# Patient Record
Sex: FEMALE | Race: WHITE | NOT HISPANIC OR LATINO | Employment: OTHER | URBAN - METROPOLITAN AREA
[De-identification: names, ages, dates, MRNs, and addresses within clinical notes are randomized per-mention and may not be internally consistent; named-entity substitution may affect disease eponyms.]

---

## 2017-01-03 ENCOUNTER — HOSPITAL ENCOUNTER (OUTPATIENT)
Dept: RADIOLOGY | Facility: CLINIC | Age: 52
Discharge: HOME | End: 2017-01-03
Attending: FAMILY MEDICINE
Payer: MEDICARE

## 2017-01-03 ENCOUNTER — OFFICE VISIT (OUTPATIENT)
Dept: FAMILY MEDICINE | Facility: CLINIC | Age: 52
End: 2017-01-03
Payer: MEDICARE

## 2017-01-03 VITALS
WEIGHT: 207.25 LBS | SYSTOLIC BLOOD PRESSURE: 154 MMHG | DIASTOLIC BLOOD PRESSURE: 78 MMHG | HEIGHT: 69 IN | RESPIRATION RATE: 18 BRPM | HEART RATE: 77 BPM | TEMPERATURE: 98 F | BODY MASS INDEX: 30.7 KG/M2

## 2017-01-03 DIAGNOSIS — I50.9 CONGESTIVE HEART FAILURE, UNSPECIFIED CONGESTIVE HEART FAILURE CHRONICITY, UNSPECIFIED CONGESTIVE HEART FAILURE TYPE: ICD-10-CM

## 2017-01-03 DIAGNOSIS — E66.9 OBESITY (BMI 30.0-34.9): ICD-10-CM

## 2017-01-03 DIAGNOSIS — E78.5 HYPERLIPIDEMIA ASSOCIATED WITH TYPE 2 DIABETES MELLITUS: ICD-10-CM

## 2017-01-03 DIAGNOSIS — Z91.89 CARDIOVASCULAR EVENT RISK: ICD-10-CM

## 2017-01-03 DIAGNOSIS — Z86.79 HISTORY OF ATRIAL FIBRILLATION: ICD-10-CM

## 2017-01-03 DIAGNOSIS — I11.9 HYPERTENSIVE LEFT VENTRICULAR HYPERTROPHY, WITHOUT HEART FAILURE: Primary | ICD-10-CM

## 2017-01-03 DIAGNOSIS — E11.69 HYPERLIPIDEMIA ASSOCIATED WITH TYPE 2 DIABETES MELLITUS: ICD-10-CM

## 2017-01-03 PROCEDURE — 71020 XR CHEST PA AND LATERAL: CPT | Mod: 26,,, | Performed by: RADIOLOGY

## 2017-01-03 PROCEDURE — 3078F DIAST BP <80 MM HG: CPT | Mod: S$GLB,,, | Performed by: FAMILY MEDICINE

## 2017-01-03 PROCEDURE — 1159F MED LIST DOCD IN RCRD: CPT | Mod: S$GLB,,, | Performed by: FAMILY MEDICINE

## 2017-01-03 PROCEDURE — 99214 OFFICE O/P EST MOD 30 MIN: CPT | Mod: S$GLB,,, | Performed by: FAMILY MEDICINE

## 2017-01-03 PROCEDURE — 2022F DILAT RTA XM EVC RTNOPTHY: CPT | Mod: S$GLB,,, | Performed by: FAMILY MEDICINE

## 2017-01-03 PROCEDURE — 99999 PR PBB SHADOW E&M-EST. PATIENT-LVL IV: CPT | Mod: PBBFAC,,, | Performed by: FAMILY MEDICINE

## 2017-01-03 PROCEDURE — 3045F PR MOST RECENT HEMOGLOBIN A1C LEVEL 7.0-9.0%: CPT | Mod: S$GLB,,, | Performed by: FAMILY MEDICINE

## 2017-01-03 PROCEDURE — 71020 XR CHEST PA AND LATERAL: CPT | Mod: TC,PO

## 2017-01-03 PROCEDURE — 3077F SYST BP >= 140 MM HG: CPT | Mod: S$GLB,,, | Performed by: FAMILY MEDICINE

## 2017-01-03 PROCEDURE — 3066F NEPHROPATHY DOC TX: CPT | Mod: S$GLB,,, | Performed by: FAMILY MEDICINE

## 2017-01-03 RX ORDER — ASPIRIN 81 MG/1
81 TABLET ORAL DAILY
COMMUNITY
End: 2017-10-26 | Stop reason: SDUPTHER

## 2017-01-03 RX ORDER — FUROSEMIDE 40 MG/1
20 TABLET ORAL 2 TIMES DAILY
Qty: 30 TABLET | Refills: 3 | Status: SHIPPED | OUTPATIENT
Start: 2017-01-03 | End: 2017-01-04

## 2017-01-03 NOTE — MR AVS SNAPSHOT
[facility]  [address]  [place] [address]  Phone: [phone]  Fax: [phone]                  Leanna García   1/3/2017 10:00 AM   Office Visit    Description:  Female : 1965   Provider:  Nolberto Lomax MD   Department:  [place] [facility]           Reason for Visit     Follow-up           Diagnoses this Visit        Comments    Hypertensive left ventricular hypertrophy, without heart failure    -  Primary     History of atrial fibrillation         Cardiovascular event risk         Congestive heart failure, unspecified congestive heart failure chronicity, unspecified congestive heart failure type         Uncontrolled type 2 diabetes mellitus with stage 3 chronic kidney disease, without long-term current use of insulin                To Do List           Future Appointments        Provider Department Dept Phone    1/3/2017 10:45 AM [facility] XR1 [facility]- X-Ray [phone]    1/3/2017 3:00 PM Oscar Charles MD [facility] Nephrology [phone]    2017 1:40 PM Nolberto Lomax MD [facility] [phone]    2017 9:00 AM SPECIMEN, [facility] - Lab [phone]    2017 11:20 AM Nolberto Lomax MD [facility] [phone]      Goals (5 Years of Data)     None      [facility]     [facility] On Call Nurse Care Line -  Assistance  Registered nurses in the [facility] On Call Center provide clinical advisement, health education, appointment booking, and other advisory services.  Call for this free service at [phone].             Medications           Message regarding Medications     Verify the changes and/or additions to your medication regime listed below are the same as discussed with your clinician today.  If any of these changes or additions are incorrect, please notify your healthcare provider.        STOP taking these medications     aspirin 325 MG tablet Take 1 tablet (325 mg total) by mouth once daily.     cloNIDine tablet 0.1 mg            Verify that the below list of medications is an accurate representation of the medications you are currently taking.  If none reported, the list may be blank. If incorrect, please contact your healthcare provider. Carry this list with you in case of emergency.           Current Medications     albuterol (PROVENTIL) 2.5 mg /3 mL (0.083 %) nebulizer solution Take 2.5 mg by nebulization 2 (two) times daily as needed for Wheezing.    albuterol 90 mcg/actuation inhaler Inhale 2 puffs into the lungs 4 (four) times daily.    aspirin (ECOTRIN) 81 MG EC tablet Take 81 mg by mouth once daily.    atorvastatin (LIPITOR) 80 MG tablet Take 1 tablet (80 mg total) by mouth once daily.    blood sugar diagnostic Strp TrueResult Glucometer Strips.  Testing Daily.    escitalopram oxalate (LEXAPRO) 10 MG tablet Take 1 tablet (10 mg total) by mouth every evening.    fenofibrate 160 MG Tab Take 1 tablet (160 mg total) by mouth once daily.    fish oil-omega-3 fatty acids 300-1,000 mg capsule Take 2 g by mouth once daily. Stop taking until after surgery.    furosemide (LASIX) 40 MG tablet Take 1 tablet (40 mg total) by mouth once daily.    glipiZIDE (GLUCOTROL) 10 MG tablet Take 1 tablet (10 mg total) by mouth 2 (two) times daily before meals.    guaifenesin-codeine 100-10 mg/5 ml (TUSSI-ORGANIDIN NR)  mg/5 mL syrup Take 5 mLs by mouth 3 (three) times daily as needed for Cough.    hydrochlorothiazide (HYDRODIURIL) 12.5 MG Tab Take 12.5 mg by mouth once daily.    lancets Misc Use to test blood sugar daily    True Results  DX:E11.9    levoFLOXacin (LEVAQUIN) 250 MG tablet Take 3 tablets (750 mg total) by mouth once daily.    losartan (COZAAR) 100 MG tablet Take 1 tablet (100 mg total) by mouth once daily.    metformin (GLUCOPHAGE-XR) 750 MG 24 hr tablet Take 1 tablet (750 mg total) by mouth 2 (two) times daily with meals.    metoprolol tartrate (LOPRESSOR) 25 MG tablet Take 1 tablet (25 mg total) by  "mouth 2 (two) times daily.    nifedipine (PROCARDIA-XL) 60 MG (OSM) 24 hr tablet Take 1 tablet (60 mg total) by mouth once daily.    omeprazole (PRILOSEC) 40 MG capsule Take 1 capsule (40 mg total) by mouth once daily.    polyethylene glycol (GLYCOLAX) 17 gram/dose powder Take 17 g by mouth once daily. 2 x daily for 2 weeks then once daily for 4 weeks, hold if loose bm and decrease to every other day    TRUEPLUS LANCETS 33 gauge [facility]            Clinical Reference Information           Vital Signs - Last Recorded  Most recent update: 1/3/2017  9:38 AM by Melyssa Boyce MA    BP Pulse Temp Resp Ht Wt    (!) 154/78 (BP Location: Right arm, Patient Position: Sitting, BP Method: Manual) 77 97.8 °F (36.6 °C) (Oral) 18 5' 9" (1.753 m) 94 kg (207 lb 3.7 oz)    BMI                30.6 kg/m2          Blood Pressure          Most Recent Value    BP  (!)  154/78      Allergies as of 1/3/2017     Dilaudid [Hydromorphone (Bulk)]    Lortab [Hydrocodone-acetaminophen]      Immunizations Administered on Date of Encounter - 1/3/2017     None      Orders Placed During Today's Visit      Normal Orders This Visit    Ambulatory referral to Cardiology     Ambulatory referral to Ophthalmology     Future Labs/Procedures Expected by Expires    X-Ray Chest PA And Lateral  1/3/2017 1/3/2018      "

## 2017-01-03 NOTE — PROGRESS NOTES
"[facility]  Progress Note    Subjective:       Patient ID: Leanna García is a 51 y.o. female.    Chief Complaint: shortness of breath follow up    HPI51 y.o.female is here today for follow-up visit.  Patient was evaluated at last visit for continued cough.  Chest x-ray showed bilateral pleural effusion at that time.  Patient dose not have a history of heart failure.  Patient does have a past history of atrial fibrillation and left ventricular hypertrophy.  Patient continues to have uncontrolled blood pressure which may be may be contributing to her symptoms.  Since patient began Lasix symptoms have resolved. Patient is doing much better today. BP has been in better control. Still not at goal. No further complaints at the current time.    Review of Systems   Constitutional: Negative for chills, fatigue and fever.   HENT: Negative for congestion, ear pain, postnasal drip, sinus pressure, sneezing and sore throat.    Eyes: Negative for pain.   Respiratory: Positive for cough. Negative for shortness of breath and wheezing.    Cardiovascular: Positive for leg swelling. Negative for chest pain and palpitations.   Gastrointestinal: Negative for abdominal distention, abdominal pain, constipation, diarrhea, nausea and vomiting.   Genitourinary: Negative for difficulty urinating.   Musculoskeletal: Negative for back pain and myalgias.   Skin: Negative for rash.   Neurological: Negative for dizziness, seizures, syncope, weakness and numbness.   Psychiatric/Behavioral: Negative for sleep disturbance. The patient is not nervous/anxious.        Objective:      Vitals:    01/03/17 0930 01/03/17 0935   BP: (!) 168/75 (!) 154/78   BP Location: Right arm Right arm   Patient Position: Sitting Sitting   BP Method: Automatic Manual   Pulse: 77    Resp: 18    Temp: 97.8 °F (36.6 °C)    TempSrc: Oral    Weight: 94 kg (207 lb 3.7 oz)    Height: 5' 9" (1.753 m)      Body mass index is 30.6 kg/(m^2).  Physical Exam "   Constitutional: She is oriented to person, place, and time. She appears well-developed and well-nourished.   HENT:   Head: Normocephalic and atraumatic.   Eyes: Conjunctivae and EOM are normal. Pupils are equal, round, and reactive to light.   Neck: Normal range of motion. Neck supple. No JVD present.   Cardiovascular: Normal rate, regular rhythm, normal heart sounds and intact distal pulses.  Exam reveals no gallop and no friction rub.    No murmur heard.  Pulses:       Dorsalis pedis pulses are 2+ on the right side, and 2+ on the left side.        Posterior tibial pulses are 2+ on the right side, and 2+ on the left side.   +2 pitting edema bilaterally lower extremity    Pulmonary/Chest: Effort normal. No respiratory distress. She has no wheezes.   Abdominal: Soft. Bowel sounds are normal. There is no tenderness. There is no rebound and no guarding.   Musculoskeletal: Normal range of motion.        Right foot: There is normal range of motion and no deformity.        Left foot: There is normal range of motion and no deformity.   Feet:   Right Foot:   Protective Sensation: 10 sites tested. 10 sites sensed.   Skin Integrity: Negative for ulcer, blister, skin breakdown, erythema, warmth, callus or dry skin.   Left Foot:   Protective Sensation: 10 sites tested. 10 sites sensed.   Skin Integrity: Negative for ulcer, blister, skin breakdown, erythema, warmth, callus or dry skin.   Neurological: She is alert and oriented to person, place, and time. No cranial nerve deficit.   Skin: Skin is warm and dry.   Psychiatric: She has a normal mood and affect. Her behavior is normal. Judgment and thought content normal.   Nursing note and vitals reviewed.      Assessment:       1. Hypertensive left ventricular hypertrophy, without heart failure    2. History of atrial fibrillation    3. Cardiovascular event risk, ASCVD 10-year risk 6.7%    4. Congestive heart failure, unspecified congestive heart failure chronicity, unspecified  congestive heart failure type    5. Uncontrolled type 2 diabetes mellitus with stage 3 chronic kidney disease, without long-term current use of insulin    6. Hyperlipidemia associated with type 2 diabetes mellitus    7. Obesity (BMI 30.0-34.9)        Plan:       Hypertensive left ventricular hypertrophy, without heart failure        - Patient is beginning to have fluid backup secondary to uncontrolled HTN         - Will follow up with cardiology for further recommendations     History of atrial fibrillation        - Follow up with cardiology     Cardiovascular event risk, ASCVD 10-year risk 6.7%  -     Ambulatory referral to Cardiology    Congestive heart failure, unspecified congestive heart failure chronicity, unspecified congestive heart failure type  -     X-Ray Chest PA And Lateral; Future; Expected date: 1/3/17  - Patient was found to have bilateral pleural effusions at last vist  - Since lasix symptoms have resolved     Uncontrolled type 2 diabetes mellitus with stage 3 chronic kidney disease, without long-term current use of insulin  -     Ambulatory referral to Ophthalmology    Patient readiness: acceptance and barriers:none    During the course of the visit the patient was educated and counseled about the following:     Diabetes:  Discussed general issues about diabetes pathophysiology and management.  Educational material distributed.  Addressed ADA diet.  Suggested low cholesterol diet.  Hypertension:   Dietary sodium restriction.  Regular aerobic exercise.  Check blood pressures daily and record.  Obesity:   General weight loss/lifestyle modification strategies discussed (elicit support from others; identify saboteurs; non-food rewards, etc).  Informal exercise measures discussed, e.g. taking stairs instead of elevator.  Regular aerobic exercise program discussed.    Goals: Diabetes: Maintain Hemoglobin A1C below 7, Hypertension: Reduce Blood Pressure and Obesity: Reduce calorie intake and BMI    Did  patient meet goals/outcomes: Yes    The following self management tools provided: blood pressure log  blood glucose log  excercise log    Patient Instructions (the written plan) was given to the patient/family.     Time spent with patient: 45 minutes      Return in about 4 weeks (around 1/31/2017).  Nolberto Lomax MD  [facility]  1/3/2017 10:03 AM

## 2017-01-04 ENCOUNTER — TELEPHONE (OUTPATIENT)
Dept: FAMILY MEDICINE | Facility: CLINIC | Age: 52
End: 2017-01-04

## 2017-01-04 RX ORDER — FUROSEMIDE 40 MG/1
TABLET ORAL
COMMUNITY
End: 2017-01-04 | Stop reason: SDUPTHER

## 2017-01-04 RX ORDER — FUROSEMIDE 40 MG/1
40 TABLET ORAL DAILY
Qty: 45 TABLET | Refills: 0 | Status: SHIPPED | OUTPATIENT
Start: 2017-01-04 | End: 2017-03-02 | Stop reason: SDUPTHER

## 2017-01-04 NOTE — TELEPHONE ENCOUNTER
----- Message from Sofie Taylor sent at 1/4/2017  1:40 PM CST -----  Contact:  call //[phone]    Calling to    Get  Test  Results // please call

## 2017-01-04 NOTE — TELEPHONE ENCOUNTER
----- Message from Orly More sent at 1/4/2017 10:01 AM CST -----  Contact: pt  As requested message for [place], please call [facility] at # [phone] for referral  Call back on   thanks

## 2017-01-04 NOTE — TELEPHONE ENCOUNTER
----- Message from Nolberto Lomax MD sent at 1/3/2017 12:52 PM CST -----  Please call and inform patient that pneumonia and fluid in lungs have resolved.

## 2017-01-04 NOTE — TELEPHONE ENCOUNTER
----- Message from So Mansfield sent at 1/3/2017  2:45 PM CST -----  Contact: patient  Patient calling in regards to wanting a new referral to see a Nephrology Doctor located in [place], because she couldn't make the appt today in [place]. She needs a Doctor closse to her. She would like to speak with a Nurse. Please advise.  Call back .  Thanks!

## 2017-01-04 NOTE — TELEPHONE ENCOUNTER
Referral: Dr.Daniel Carlson, due to location, Nephrology patient has 3/6/2017 appointment scheduled. She will call insurance to be certain  is approved provider.  Asks about Lasix through mail order; verified with ; Lasix 40mg 1/2 tablet daily #45 no refills is correct dose. Called [employer]; DC'd all Lasix order and gave verbal for correct dose and quantity; e-scribed also. Left message with information for patient

## 2017-01-05 ENCOUNTER — OUTPATIENT CASE MANAGEMENT (OUTPATIENT)
Dept: ADMINISTRATIVE | Facility: OTHER | Age: 52
End: 2017-01-05

## 2017-01-05 NOTE — PROGRESS NOTES
Name Leanna RIZVI 1965 Medical Record  # [id number]   Visit Location __ Home __ Clinic _x_ Telephonic __Other  Encounter Date:  17   Contact Type __ New Case   _x_ Follow Up  __ Monitoring  __ Case Closure Next Follow-up Date Week of   17             Summary:  17- Called and spoke to patient about her blood pressure, new medications, s/p hernia surgery, constipation, diabetes, pneumonia and her upcoming doctor appts and labs. Patient has an appt with Dr. Gautam, nephrologist on 2017. Patient to have A1C drawn on 17. Sending patient [facility]  financial assistance paperwork. Will follow up with patient in three weeks.     16-Called and spoke to patient. Went over follow up appts with patient after hospital discharge. Will follow up with patient in 4 weeks.     16-Called and spoke to patient. Patient had a sigmoidoscopy yesterday and a polyp removed. Patient has an appt with Dr. Serrano on 11/15/16 to schedule surgery for her hernia repair.Encouraged her to schedule her surgery as soon as possible if she is having any pain.  Patient is taking all her medications. Blood pressure is 127/80. Patient's A1C was 8.0 on 10/22/16. Will mail to patient diabetic holiday party recipes    10/27/16-Called and patient is unable to talk at this time. Asked to be called back next week.     10/31/16- Called and spoke to patient. Went over education with her on getting her labs, diabetic and blood pressure education. Will mail to patient diabetic education on holiday party recipes. Will follow up with patient after her appt on 10/25/16.    16-Called and patient has seen several doctors for her abdominal pain and her two inguinal hernias. Patient is having a colonoscopy on 10/05/16. Then patient will talk to surgeon about having surgery if needed. Patient went over her instructions with me for the prep for her colonoscopy, answered all her questions. Patient is taking four blood  pressure medications. Talked to patient about handling stress during this time. Will mail patient diabetic education material and blood pressure education material.     09/09/16-Called and spoke to the patient. Patient has been to urgent care, an appt with Dr. Dial and to the ER. Patient has a cough from bronchitis and her blood pressure was high when she went to the ER. Patient is now on four blood pressure medications. Patient asking if she should be on all these blood pressure medications. Patient states that her labs did not show she had a UTI but she was given an antibiotic. Patient is drinking water and cranberry juice.  Patient is taking all her medications. Patient does have a new blood pressure cuff. Her blood pressure this am was 136/79. Patient is aware if her blood pressure gets too high she could have a stroke. Patient is having some stress in her life. Patient's blood glucose this am was 125. Will cont to follow patient for needs.     08/26/16-Called and spoke to patient about her blood pressure, bronchitis and diabetes. Patient is not having any signs or symptoms of bronchitis. Went over educational material with patient on blood pressure and diabetes. Patient's  has gone back to work after being laid off from another job but they are not having any financial difficulties. Patient blood glucose this am was 123. Patient wants to start exercising again but her back is bothering her. Patient is going to get an eye appt when she gets the money for an appt. Patient is trying to follow a low sodium/diabetic diet. Patient's next A1c is in October and she can not wait to see if her A1C is lower.  Will mail patient information on diabetic and blood pressure education.     08/11/16- Patient received all the diabetic information sent to her. Went over the material and answered questions. Patient had an appt with her PCP on 08/08/16. Patient was given a celestone injection, rocephin injection and a RX  for cough syrup. Patient states that she is much better. Patient's blood glucose did run high after the celestone injection but now is running anywhere from 70 to 120. Patient is taking all of her medication and patient's blood pressure remains high so the doctor changed her medication. Patient is eating healthy. Discussed A1C with patient. Her next lab to check her A1C is in October. Patient wants to lose weight and start exercising again.  Will send patient diabetic education and blood pressure education material to patient.     08/04/16-Called and left message for patient to call back.     07/22/16-Called and spoke to patient. Patient's  is out of work at this time. Went over information mailed to patient about diabetes and high blood pressure. Patient needs a new battery for her blood pressure machine. Patient's blood glucose this am was 113. Patient feels like she does not need a dietician at this time since she is taking glipzide. Patient feels like her diabetes is under control with this new medication. Went over diabetes distress scale questions with patient. Will mail patient diabetic education material and blood pressure education.     07/18/16-Called and patient is in [employer]. Will follow up with  patient another day.     07/05/16-Called and spoke to Leanna García, 50 year old female. Patient is independent with her ADL's and does not have home health. Patient does not drive. Patient checks her blood pressure and blood glucose one time a day and keeps logs. Patient was changed from levemir to glipizide because she could not afford the insulin. Patient has no problems with paying for her medications at this time. Went over signs and symptoms of a stroke with patient. Patient eats three meals a day with snacks. Patient does have back pain at times because of her DDD and scoliosis per patient. Patient and her  went camping this weekend. Will send patient resource material for diabetes,  "hypertension, OPCM brochure and my card. Case Management will cont to follow for needs.       Plan:  Problem 1-Discharge hospital instructions -- MET   Problem 2- Diabetes education  Problem 3- Blood Pressure education       Problem  1    Discharge Instructions - Recent Hospitalization   Short Term Goal Patient/Caregiver will:   Be able to verbalize _amoxicillin and colace/metamucil _______ medication dosage/route frequency within _now_.  (__Partially met _x__Met __Not met ___ Deferred)     Will verbalize _2_  interventions of preventing complications due to disease process in now  (__Partially met _x__Met __Not met ___ Deferred)         Long Term Goals Ensure patients who transition from one site of care to the next have the tools and knowledge to be successful with on-going self management.      Goal Met _x_Yes  __No   Reason:      Interventions Date Addressed    x__Compare pre-hospital medications with medications on hospital discharge list 12/05/16   _x_ Identify medications that were prescribed but not obtained   12/05/16   __ Identify medication discrepancies and develop a plan to resolve discrepancies      _x_ Answer questions about medications   12/05/16   __ Alert patient to potential adverse drug reaction(s)      __ Encourage  use of patient's medication management "system"       __ Identify medications needing refills and/or barriers to refill    _x_ Encourage patient to set up follow-up appointment   12/05/16   _x_ Discuss questions with patient for PCP or specialist visit   12/05/16   __ Clarify whether patient will need to obtain follow up tests and/or results      __ Provide teaching for how to obtain follow-up tests and results      _x_ Review discharge instructions   12/05/16   _x_ Discuss patients conditions and self management of condition(s)    12/05/16   _x_ Discuss target symptoms / side effects to monitor and what to do should they arise   12/05/16   _x_ Discuss when PCP should be called   " 12/05/16   _x_Discuss pain management.    12/05/16   _x_ Discuss constipation    12/05/16   __ Discuss patient's personal goal and possible steps for achieving      __ Ensure Durable Medical Equipment is delivered      __ Ensure [facility] is involved if appropriate      _x_ Determine adequacy of support system and need for ongoing case management   12/05/16   __ Connect patient to necessary community resources      __    __    __        Problem   1 Diabetes   Short Term Goal Patient/Caregiver will:   Check and record blood glucose  _1_  times per day for _2_ weeks  (_x_Partially met _x__Met __Not met ___ Deferred)   Verbalize _2_ signs and symptoms of hypo/hyper glycemia _2 _ weeks  (_x_Partially met _x__Met __Not met ___ Deferred)     Verbalize current A1C and understanding of recommended value in _2_ weeks  (_x_Partially met _x__Met __Not met ___ Deferred)      Diet and Meal Planning  Patient/Caregiver will:   Replace _carbohydrates _____________ with _fruits and vegetables ___________ for _2  meals for _2_  Weeks.   (_x_Partially met _x__Met __Not met ___ Deferred)     Choose ____diabetic ______ grocery list items to prepare diet meals in next _2_ weeks  (_x_Partially met _x__Met __Not met ___ Deferred)      Exercise and Weight  Patient/Caregiver will:   Exercise _walk __ for _10 _ minutes a day _3_ times a week  o Example: Yoga, Walk, Jog, etc.  (_x_Partially met ___Met __Not met ___ Deferred     Weigh Daily/Weekly and record weight for _6_ weeks  (__Partially met ___Met __Not met _x__ Deferred)     Gain/Lose _2_ pounds  by Exercising/Walking in _4_ weeks  (_x_Partially met _x__Met __Not met ___ Deferred)    Medications  Patient/Caregiver will:   Be able to verbalize _glucotrol and glucophage_______ medication dosage/route frequency within _2_ weeks  (_x_Partially met _x__Met __Not met ___ Deferred)      Self-Care  Patient/Caregiver will:   Will follow-up with PCP to coordinate referrals to _LABS  "__________ (Ex: Ophthalmology, Podiatry, Labs, Dietician) within _2 weeks  (_x_Partially met ___Met __Not met ___ Deferred)     Will verbalize _2_  interventions of preventing complications due to disease process in _4_ weeks  (_x_Partially met _x__Met __Not met ___ Deferred)    Complications and Co-Morbidities  Patient/Caregiver will:   Can verbalize _2_ long-term complications of diabetes in _4_ weeks  (_x_Partially met _x__Met __Not met ___ Deferred)   Can verbalize _2_ interventions to keep Feet/Eyes healthy in _4_  weeks  (_xPartially met _x__Met __Not met __ Deferred)     Long Term Goals Patient will get A1C tested every 6 months and is results ? 8 will make appoint with PCP within 2 weeks   Goals Met Partially met  x Not met Deferred    Interventions Date Addressed    __ Review with and provide education materials to pt. Types of Diabetes: "Pre Diabetes" (LENNY)    __ Review with and provide education materials to pt. Types of Diabetes: "What is Type 1 Diabetes?" (LENNY)    _x_ Review with and provide education materials to pt. Types of Diabetes: "What is Type 2 Diabetes?" (LENNY) 08/11/16 09/23/16   _x_ Review with and provide education materials to pt. Resources for People with Diabetes (LENNY) 07/22/16 09/23/16     Glucose Monitoring    _x_ Discuss glucometer monitoring  07/05/16  07/22/16  08/11/16  07/26/16  09/09/16  09/23/16  10/10/16  11/03/16  12/05/16  01/05/17   _x_ Educate regarding physicians recommended BS range 07/05/16  07/22/16  08/11/16  09/09/16  09/23/16  10/10/16  01/05/17   _x_ Review with and provide education materials to pt. Review with and provide education materials to pt. Blood Sugar Management: Hypoglycemia (Low Blood Sugar) (LENNY) 07/05/16 07/22/16 08/11/16 08/26/16 09/23/16 12/05/16 01/05/17   _x_ Review with and provide education materials to pt. Review with and provide education materials to pt. Blood Sugar Management: Hyperglycemia (High Blood Sugar) " "(JOCELYNEMES) 07/05/16 07/22/16 01/05/17   _x_ Review with and provide education materials to pt. Blood Sugar Management: Using a Blood Sugar Log  ([employer]) 07/05/16   _x_  Review with and provide education materials to pt. Blood Sugar Management: Managing Diabetes: The A1C Test ([facility]) 07/05/16 07/22/16 08/11/16 08/26/16 11/03/16 01/05/17   Diet and Meal Planning     _x_ Review with and provide education materials to pt. Diet and Meal Planning - "Healthy Meals for Diabetes" (LENNY) 07/05/16  07/22/16  08/11/16  08/26/16  09/23/16  10/10/16  11/03/16  12/05/16  01/05/17   _x Review with and provide education materials to pt. Diet and Meal Planning - "Diabetes: Understanding Carbohydrates"  and Understanding Carbohydrates, Fats and Protein, (LENNY) 07/05/16      _x_ Review with and provide education materials to pt. Diet and Meal Planning - "Eating Out When You Have Diabetes" (LENNY) 08/11/16 08/26/16 01/05/17   _x_ Review with and provide education materials to pt. Diet and Meal Planning - "Learning About Serving and Portion Sizes"  (LENNY) 07/05/16 08/11/16     _x_ Review with and provide education materials to pt. Diet and Meal Planning - "Diabetes: Shopping for and Preparing Meals"  (LENNY) 07/22/16 08/26/16 11/03/16     _x_ Review with and provide education materials to pt. Diet and Meal Planning - "Diabetes: Learning About Serving and Portion Sizes"  (LENNY) 12/05/16 01/05/17   _x_ Review with and provide education materials to pt. Diet and Meal Planning - Grocery List   11/03/16   _x_Review with and provide education materials to pt. Review with and provide education materials to pt. Diet and Meal Planning - "Eating a High Fiber Diet"  (JOCELYNEMES) 08/11/16 12/05/16   _x_ Review with and provide education materials to pt. Diet and Meal Planning - "Diabetes: Meal Planning"  (LENNY) 08/11/16   __ Review with and provide education materials to pt. Complications & Co morbidities - "Diabetes and " "Alcohol Consumption" ([employer] [facility])    Exercise and Weight    _x_ Review with and provide education materials to pt. Exercise: "Exercise to Manage Your Blood Sugar" ([employer] 07/22/16 08/11/16 08/26/16   _x_ Review with and provide education materials to pt. Exercise: "Diabetes: The Benefits of Exercise" ([employer] [facility] [place]) 08/26/16   __ Review with and provide education materials to pt. Exercise: "Before You Start With Diabetes Exercise Plan" ([employer] [facility] [place])    _x_ eview with and provide education materials to pt. Exercise: "Diabetes: Getting Started with Exercise" ([facility] [facility] [place]) 08/11/16   _x_ Review with and provide education materials to pt. Exercise: "Diabetes: Activity Tips" ([facility] [place]) 08/26/16   _x_ Review with and provide education materials to pt. Exercise: "Diabetes: Tracking Your Fitness Progress" ([facility] [facility] [place]) 08/26/16   __ Review with and provide education materials to pt. Exercise: "Type 1 Diabetes: Getting Active"  ([facility] [facility] [place])     _x_ Review with and provide education materials to pt. Weight Management: "Finding Your Ideal Weight" ([facility] [facility] [place]) 08/26/16   Medications    __ Review with and provide education materials to pt. Medications: "Using Injected Insulin" ([employer]    _x_ Review with and provide education materials to pt. Medications: "Oral Therapy for Type 2 Diabetes" ([employer] 07/05/16 08/11/16 08/26/16 09/09/16 11/03/16       __ Review with and provide education materials to pt. Medications: "Types of Insulin" ([employer]    __ Review with and provide education materials to pt. Medications: "Diabetes: Ways to Take Medication"  ([employer]    _x_ Review with and provide education materials to pt. Medications: "Taking Medication for Diabetes"  ([employer] 07/05/16 07/22/16 08/11/16 08/26/16 09/09/16   Complications and Co morbidities    __ Review with and provide education materials to pt. Complications & Co morbidities - "After Leg Amputation: Keeping Your Other Leg Healthy" ([employer]    __ Review with and provide education materials to pt. " "Complications & Co morbidities - "Diabetic Retinopathy: Controlling Your Risk Factors" ([facility]    _x_ Review with and provide education materials to pt. Complications & Co morbidities - "Long-term Complications of Diabetes" (CURRENT) 08/11/16 08/26/16 09/09/16   __ Review with and provide education materials to pt. Complications & Co morbidities - "Diabetic Retinopathy: Evaluating Your Eyes"(CURRENT)    _x_ Review with and provide education materials to pt. Types of Diabetes: "Diabetic Retinopathy: Controlling Your Risk Factors" (LENNY) 08/26/16    Self  Care    _x_  Review with and provide education materials to pt. Self Care - "Diabetes: Keeping Your Feet Healthy" (CURRENT) 07/22/16     _x_ Review with and provide education materials to pt. Self Care - "Your Diabetes Foot Care Program"  (CURRENT) 08/26/16 09/09/16   _x_ Review with and provide education materials to pt. Self Care - "Diabetes: Caring For Your Body" (LENNY) 10/10/16   _x_ Review with and provide education materials to pt. Self Care - "Diabetes: Sick-Day Plan" (CURRENT) 08/11/16 09/09/16 12/05/16 01/05/17   __ Review with and provide education materials to pt. Self Care - "Diabetes: Driving Issues" (CURRENT)    __ Review with and provide education materials to pt. Self Care - "Diabetes: Living Your Life" (CURRENT)    __ Review with and provide education materials to pt. Self Care - "Getting Support When You Have Diabetes" (CURRENT)    _x_ Review with and provide education materials to pt. Self Care - "Managing Stress When You Have Diabetes" (CURRENT) 09/23/16 11/03/16 01/05/17   _x_ Review with and provide education materials to pt. Self Care - "Planning for Travel When You Have Diabetes" (CURRENT) 08/11/16   __ Review with and provide education materials to pt. Types of Diabetes: "Your Diabetes Toolkit" ([facility]          Referrals/Management    __ Facilitate referral to diabetic class    __ Facilitate obtaining DM supplies    __ Facilitate referral " "to podiatrist    __ Monitor DM management (HgA1c, dental exam 2xs/yr, annual flu shot, annual eye exam, annual comprehensive foot exam)    _x_ Encourage regular medical appointments 07/05/16  08/11/16  09/09/16  09/23/16  10/10/16  12/05/16  01/05/17                             Problem   2  Blood Pressure Control   Short Term Goal   Patient/Caregiver will:   Will measure and record blood pressure daily for 2 weeks..  (_x_Partially met _x__Met __Not met ___ Deferred)   Will verbalize_2_   ways to reduce stress in _12_ weeks.  (_x_Partially met ___Met __Not met ___ Deferred)   Will verbalize _2_  risk factors within _6_ weeks.  (_x_Partially met _x__Met __Not met ___ Deferred)  Patient/Caregiver will:   Be able to verbalize norvasc________ medication dosage/route frequency within _2_ weeks  (__Partially met _x__Met __Not met ___ Deferred)      Complications and Co-Morbidities  Patient/Caregiver will:    verbalize _2_ long-term complications of hypertension in _4_ weeks  (_x_Partially met _x__Met __Not met ___ Deferred)   Can verbalize _2_ interventions to keep blood pressure under control  in _8  weeks  (_x_Partially met _x__Met __Not met ___ Deferred)     Long Term Goals Patient/Caregiver will check and record blood pressure daily. If > 140/90 for 3 days, will notify Physician.   Goal Met Met  x Partially met   Not Met Deferred    Interventions Date Addressed    _x_ Review and provide education materials with pt. "Controlling High Blood Pressure" (LENNY)   07/05/16  08/11/16  08/26/16  09/23/16  10/10/16  12/05/16  01/05/17     _x_ Review and provide education materials with pt. "Your High Blood Pressure Risk Factors" (LENNY)   08/11/16  10/10/16  12/05/16  01/05/17   _x_ Review and provide education materials with pt. "Taking Your Blood Pressure" (LENNY)   07/05/16 07/22/16 09/09/16 12/05/16  01/05/17     _x_ Review and provide education materials with pt. "What is High Blood Pressure" (LENNY)   " "07/05/16  09/09/16  10/10/16  12/05/16  01/05/17   __ Review and provide education materials with pt. "High Blood Pressure and Peripheral Arterial Disease" (LENNY)      _x_ Encourage medication compliance   07/05/16  09/09/16  08/11/16  08/26/16  09/23/16  10/10/16  12/05/16  01/05/17   _x_ Discuss s/s of hypotension (weakness, dizziness, mental changes, bradycardia, nausea, vomiting)   07/22/16      _x_ Discuss s/s of hypertension and to call MD immediately if /110 or high BP causing headaches or other symptoms.   07/05/16  07/22/16  10/10/16  12/05/16  01/05/17     _x_ Educate Pt/family to predisposing controllable factors   07/05/16  08/11/16  10/10/16  01/05/17     __    __    ___          "

## 2017-01-06 ENCOUNTER — TELEPHONE (OUTPATIENT)
Dept: FAMILY MEDICINE | Facility: CLINIC | Age: 52
End: 2017-01-06

## 2017-01-06 NOTE — TELEPHONE ENCOUNTER
She's checking on external referral , Nephrologist. Her appointment is 3/6/17  Scheduled 4/2017  appointment with patient; written confirmation mailed;   Also informs BP today 133/80 BS 78 FYI for

## 2017-01-06 NOTE — TELEPHONE ENCOUNTER
----- Message from Daniella Churchill sent at 1/5/2017  3:08 PM CST -----  Contact: Daniella Churchill RN OPCM EXT. [id number]  Patient is asking for a referral to Dr. Parry, cardiologist. Please call patient and advise.     Thank you for your assistance,   Daniella Churchill RN OPCM

## 2017-01-13 DIAGNOSIS — N18.9 CKD (CHRONIC KIDNEY DISEASE), UNSPECIFIED STAGE: Primary | ICD-10-CM

## 2017-01-17 ENCOUNTER — TELEPHONE (OUTPATIENT)
Dept: FAMILY MEDICINE | Facility: CLINIC | Age: 52
End: 2017-01-17

## 2017-01-17 NOTE — TELEPHONE ENCOUNTER
Per patient request canceled appointment on 2/6/17. Patient stated that she has transportation problems.

## 2017-01-19 ENCOUNTER — TELEPHONE (OUTPATIENT)
Dept: FAMILY MEDICINE | Facility: CLINIC | Age: 52
End: 2017-01-19

## 2017-01-19 NOTE — TELEPHONE ENCOUNTER
Checking on external referral to ; written and faxed; patient asks if insurance covering this; suggestion to call insurance and  with questions. She voices understanding

## 2017-01-19 NOTE — TELEPHONE ENCOUNTER
----- Message from Tatiana Reza sent at 1/19/2017 11:37 AM CST -----  Contact: self  Patient called to speak to Dee   She has been speak to her regarding an appointment for  Nephrology      Please call  to advise.

## 2017-01-25 ENCOUNTER — OUTPATIENT CASE MANAGEMENT (OUTPATIENT)
Dept: ADMINISTRATIVE | Facility: OTHER | Age: 52
End: 2017-01-25

## 2017-01-25 NOTE — PROGRESS NOTES
Name Leanna RIZVI 1965 Medical Record  # [id number]   Visit Location __ Home __ Clinic _x_ Telephonic __Other  Encounter Date:  17   Contact Type __ New Case   _x_ Follow Up  __ Monitoring  __ Case Closure Next Follow-up Date Week of   17               Summary:  17-Called and spoke to patient. She was lying down because of a sinus headache. Will follow up with patient next week.     17- Called and spoke to patient about her blood pressure, new medications, s/p hernia surgery, constipation, diabetes, pneumonia and her upcoming doctor appts and labs. Patient has an appt with Dr. Gautam, nephrologist on 2017. Patient to have A1C drawn on 17. Sending patient [facility]  financial assistance paperwork. Will follow up with patient in three weeks.     16-Called and spoke to patient. Went over follow up appts with patient after hospital discharge. Will follow up with patient in 4 weeks.     16-Called and spoke to patient. Patient had a sigmoidoscopy yesterday and a polyp removed. Patient has an appt with Dr. Serrano on 11/15/16 to schedule surgery for her hernia repair.Encouraged her to schedule her surgery as soon as possible if she is having any pain.  Patient is taking all her medications. Blood pressure is 127/80. Patient's A1C was 8.0 on 10/22/16. Will mail to patient diabetic holiday party recipes    10/27/16-Called and patient is unable to talk at this time. Asked to be called back next week.     10/31/16- Called and spoke to patient. Went over education with her on getting her labs, diabetic and blood pressure education. Will mail to patient diabetic education on holiday party recipes. Will follow up with patient after her appt on 10/25/16.    16-Called and patient has seen several doctors for her abdominal pain and her two inguinal hernias. Patient is having a colonoscopy on 10/05/16. Then patient will talk to surgeon about having surgery if needed. Patient  went over her instructions with me for the prep for her colonoscopy, answered all her questions. Patient is taking four blood pressure medications. Talked to patient about handling stress during this time. Will mail patient diabetic education material and blood pressure education material.     09/09/16-Called and spoke to the patient. Patient has been to urgent care, an appt with Dr. Dial and to the ER. Patient has a cough from bronchitis and her blood pressure was high when she went to the ER. Patient is now on four blood pressure medications. Patient asking if she should be on all these blood pressure medications. Patient states that her labs did not show she had a UTI but she was given an antibiotic. Patient is drinking water and cranberry juice.  Patient is taking all her medications. Patient does have a new blood pressure cuff. Her blood pressure this am was 136/79. Patient is aware if her blood pressure gets too high she could have a stroke. Patient is having some stress in her life. Patient's blood glucose this am was 125. Will cont to follow patient for needs.     08/26/16-Called and spoke to patient about her blood pressure, bronchitis and diabetes. Patient is not having any signs or symptoms of bronchitis. Went over educational material with patient on blood pressure and diabetes. Patient's  has gone back to work after being laid off from another job but they are not having any financial difficulties. Patient blood glucose this am was 123. Patient wants to start exercising again but her back is bothering her. Patient is going to get an eye appt when she gets the money for an appt. Patient is trying to follow a low sodium/diabetic diet. Patient's next A1c is in October and she can not wait to see if her A1C is lower.  Will mail patient information on diabetic and blood pressure education.     08/11/16- Patient received all the diabetic information sent to her. Went over the material and answered  questions. Patient had an appt with her PCP on 08/08/16. Patient was given a celestone injection, rocephin injection and a RX for cough syrup. Patient states that she is much better. Patient's blood glucose did run high after the celestone injection but now is running anywhere from 70 to 120. Patient is taking all of her medication and patient's blood pressure remains high so the doctor changed her medication. Patient is eating healthy. Discussed A1C with patient. Her next lab to check her A1C is in October. Patient wants to lose weight and start exercising again.  Will send patient diabetic education and blood pressure education material to patient.     08/04/16-Called and left message for patient to call back.     07/22/16-Called and spoke to patient. Patient's  is out of work at this time. Went over information mailed to patient about diabetes and high blood pressure. Patient needs a new battery for her blood pressure machine. Patient's blood glucose this am was 113. Patient feels like she does not need a dietician at this time since she is taking glipzide. Patient feels like her diabetes is under control with this new medication. Went over diabetes distress scale questions with patient. Will mail patient diabetic education material and blood pressure education.     07/18/16-Called and patient is in [employer]. Will follow up with  patient another day.     07/05/16-Called and spoke to Leanna García, 50 year old female. Patient is independent with her ADL's and does not have home health. Patient does not drive. Patient checks her blood pressure and blood glucose one time a day and keeps logs. Patient was changed from levemir to glipizide because she could not afford the insulin. Patient has no problems with paying for her medications at this time. Went over signs and symptoms of a stroke with patient. Patient eats three meals a day with snacks. Patient does have back pain at times because of her DDD and  "scoliosis per patient. Patient and her  went camping this weekend. Will send patient resource material for diabetes, hypertension, OPCM brochure and my card. Case Management will cont to follow for needs.       Plan:  Problem 1-Discharge hospital instructions -- MET   Problem 2- Diabetes education  Problem 3- Blood Pressure education       Problem  1    Discharge Instructions - Recent Hospitalization   Short Term Goal Patient/Caregiver will:   Be able to verbalize _amoxicillin and colace/metamucil _______ medication dosage/route frequency within _now_.  (__Partially met _x__Met __Not met ___ Deferred)     Will verbalize _2_  interventions of preventing complications due to disease process in now  (__Partially met _x__Met __Not met ___ Deferred)         Long Term Goals Ensure patients who transition from one site of care to the next have the tools and knowledge to be successful with on-going self management.      Goal Met _x_Yes  __No   Reason:      Interventions Date Addressed    x__Compare pre-hospital medications with medications on hospital discharge list 12/05/16   _x_ Identify medications that were prescribed but not obtained   12/05/16   __ Identify medication discrepancies and develop a plan to resolve discrepancies      _x_ Answer questions about medications   12/05/16   __ Alert patient to potential adverse drug reaction(s)      __ Encourage  use of patient's medication management "system"       __ Identify medications needing refills and/or barriers to refill    _x_ Encourage patient to set up follow-up appointment   12/05/16   _x_ Discuss questions with patient for PCP or specialist visit   12/05/16   __ Clarify whether patient will need to obtain follow up tests and/or results      __ Provide teaching for how to obtain follow-up tests and results      _x_ Review discharge instructions   12/05/16   _x_ Discuss patients conditions and self management of condition(s)    12/05/16   _x_ Discuss " target symptoms / side effects to monitor and what to do should they arise   12/05/16   _x_ Discuss when PCP should be called   12/05/16   _x_Discuss pain management.    12/05/16   _x_ Discuss constipation    12/05/16   __ Discuss patient's personal goal and possible steps for achieving      __ Ensure Durable Medical Equipment is delivered      __ Ensure [facility] is involved if appropriate      _x_ Determine adequacy of support system and need for ongoing case management   12/05/16   __ Connect patient to necessary community resources      __    __    __        Problem   1 Diabetes   Short Term Goal Patient/Caregiver will:   Check and record blood glucose  _1_  times per day for _2_ weeks  (_x_Partially met _x__Met __Not met ___ Deferred)   Verbalize _2_ signs and symptoms of hypo/hyper glycemia _2 _ weeks  (_x_Partially met _x__Met __Not met ___ Deferred)     Verbalize current A1C and understanding of recommended value in _2_ weeks  (_x_Partially met _x__Met __Not met ___ Deferred)      Diet and Meal Planning  Patient/Caregiver will:   Replace _carbohydrates _____________ with _fruits and vegetables ___________ for _2  meals for _2_  Weeks.   (_x_Partially met _x__Met __Not met ___ Deferred)     Choose ____diabetic ______ grocery list items to prepare diet meals in next _2_ weeks  (_x_Partially met _x__Met __Not met ___ Deferred)      Exercise and Weight  Patient/Caregiver will:   Exercise _walk __ for _10 _ minutes a day _3_ times a week  o Example: Yoga, Walk, Jog, etc.  (_x_Partially met ___Met __Not met ___ Deferred     Weigh Daily/Weekly and record weight for _6_ weeks  (__Partially met ___Met __Not met _x__ Deferred)     Gain/Lose _2_ pounds  by Exercising/Walking in _4_ weeks  (_x_Partially met _x__Met __Not met ___ Deferred)    Medications  Patient/Caregiver will:   Be able to verbalize _glucotrol and glucophage_______ medication dosage/route frequency within _2_ weeks  (_x_Partially met _x__Met  "__Not met ___ Deferred)      Self-Care  Patient/Caregiver will:   Will follow-up with PCP to coordinate referrals to _LABS __________ (Ex: Ophthalmology, Podiatry, Labs, Dietician) within _2 weeks  (_x_Partially met ___Met __Not met ___ Deferred)     Will verbalize _2_  interventions of preventing complications due to disease process in _4_ weeks  (_x_Partially met _x__Met __Not met ___ Deferred)    Complications and Co-Morbidities  Patient/Caregiver will:   Can verbalize _2_ long-term complications of diabetes in _4_ weeks  (_x_Partially met _x__Met __Not met ___ Deferred)   Can verbalize _2_ interventions to keep Feet/Eyes healthy in _4_  weeks  (_xPartially met _x__Met __Not met __ Deferred)     Long Term Goals Patient will get A1C tested every 6 months and is results ? 8 will make appoint with PCP within 2 weeks   Goals Met Partially met  x Not met Deferred    Interventions Date Addressed    __ Review with and provide education materials to pt. Types of Diabetes: "Pre Diabetes" (LENNY)    __ Review with and provide education materials to pt. Types of Diabetes: "What is Type 1 Diabetes?" (LENNY)    _x_ Review with and provide education materials to pt. Types of Diabetes: "What is Type 2 Diabetes?" (LENNY) 08/11/16 09/23/16   _x_ Review with and provide education materials to pt. Resources for People with Diabetes (LENNY) 07/22/16 09/23/16     Glucose Monitoring    _x_ Discuss glucometer monitoring  07/05/16  07/22/16  08/11/16  07/26/16  09/09/16  09/23/16  10/10/16  11/03/16  12/05/16  01/05/17   _x_ Educate regarding physicians recommended BS range 07/05/16  07/22/16  08/11/16  09/09/16  09/23/16  10/10/16  01/05/17   _x_ Review with and provide education materials to pt. Review with and provide education materials to pt. Blood Sugar Management: Hypoglycemia (Low Blood Sugar) (LENNY) 07/05/16 07/22/16 08/11/16 08/26/16 09/23/16 12/05/16 01/05/17   _x_ Review with and provide education " "materials to pt. Review with and provide education materials to pt. Blood Sugar Management: Hyperglycemia (High Blood Sugar) ([employer] Count[facility]ES) 07/05/16 07/22/16 01/05/17   _x_ Review with and provide education materials to pt. Blood Sugar Management: Using a Blood Sugar Log  (Notizza) 07/05/16   _x_  Review with and provide education materials to pt. Blood Sugar Management: Managing Diabetes: The A1C Test (Notizza) 07/05/16 07/22/16 08/11/16 08/26/16 11/03/16 01/05/17   Diet and Meal Planning     _x_ Review with and provide education materials to pt. Diet and Meal Planning - "Healthy Meals for Diabetes" (Notizza) 07/05/16  07/22/16  08/11/16  08/26/16  09/23/16  10/10/16  11/03/16  12/05/16  01/05/17   _x Review with and provide education materials to pt. Diet and Meal Planning - "Diabetes: Understanding Carbohydrates"  and Understanding Carbohydrates, Fats and Protein, (LENNY) 07/05/16      _x_ Review with and provide education materials to pt. Diet and Meal Planning - "Eating Out When You Have Diabetes" (Notizza) 08/11/16 08/26/16 01/05/17   _x_ Review with and provide education materials to pt. Diet and Meal Planning - "Learning About Serving and Portion Sizes"  (JOCELYNEMES) 07/05/16 08/11/16     _x_ Review with and provide education materials to pt. Diet and Meal Planning - "Diabetes: Shopping for and Preparing Meals"  (JOCELYNEMES) 07/22/16 08/26/16 11/03/16     _x_ Review with and provide education materials to pt. Diet and Meal Planning - "Diabetes: Learning About Serving and Portion Sizes"  (Notizza) 12/05/16 01/05/17   _x_ Review with and provide education materials to pt. Diet and Meal Planning - Grocery List   11/03/16   _x_Review with and provide education materials to pt. Review with and provide education materials to pt. Diet and Meal Planning - "Eating a High Fiber Diet"  (JOCELYNEMES) 08/11/16 12/05/16   _x_ Review with and provide education materials to pt. Diet and Meal Planning - "Diabetes: Meal Planning"  " "(JOCELYNEMES) 08/11/16   __ Review with and provide education materials to pt. Complications & Co morbidities - "Diabetes and Alcohol Consumption" ([facility] [place] [facility])    Exercise and Weight    _x_ Review with and provide education materials to pt. Exercise: "Exercise to Manage Your Blood Sugar" (KRAMES) 07/22/16 08/11/16 08/26/16   _x_ Review with and provide education materials to pt. Exercise: "Diabetes: The Benefits of Exercise" (JOCELYNEMES) 08/26/16   __ Review with and provide education materials to pt. Exercise: "Before You Start With Diabetes Exercise Plan" ([facility])    _x_ eview with and provide education materials to pt. Exercise: "Diabetes: Getting Started with Exercise" ([employer]) 08/11/16   _x_ Review with and provide education materials to pt. Exercise: "Diabetes: Activity Tips" (JOCELYNEMES) 08/26/16   _x_ Review with and provide education materials to pt. Exercise: "Diabetes: Tracking Your Fitness Progress" ([employer]) 08/26/16   __ Review with and provide education materials to pt. Exercise: "Type 1 Diabetes: Getting Active"  ([employer])     _x_ Review with and provide education materials to pt. Weight Management: "Finding Your Ideal Weight" ([employer]) 08/26/16   Medications    __ Review with and provide education materials to pt. Medications: "Using Injected Insulin" (KRAMES)    _x_ Review with and provide education materials to pt. Medications: "Oral Therapy for Type 2 Diabetes" (Comprim[employer]) 07/05/16 08/11/16 08/26/16 09/09/16 11/03/16       __ Review with and provide education materials to pt. Medications: "Types of Insulin" (Comprim[employer])    __ Review with and provide education materials to pt. Medications: "Diabetes: Ways to Take Medication"  ([facility])    _x_ Review with and provide education materials to pt. Medications: "Taking Medication for Diabetes"  ([employer]prim[employer]) 07/05/16 07/22/16 08/11/16 08/26/16 09/09/16   Complications and Co morbidities    __ Review with and provide education materials to pt. Complications & Co morbidities - " ""After Leg Amputation: Keeping Your Other Leg Healthy" (mobintent)    __ Review with and provide education materials to pt. Complications & Co morbidities - "Diabetic Retinopathy: Controlling Your Risk Factors" (KRAMES)    _x_ Review with and provide education materials to pt. Complications & Co morbidities - "Long-term Complications of Diabetes" ([employer]view[employer]ES) 08/11/16 08/26/16 09/09/16   __ Review with and provide education materials to pt. Complications & Co morbidities - "Diabetic Retinopathy: Evaluating Your Eyes"(InterviewMES)    _x_ Review with and provide education materials to pt. Types of Diabetes: "Diabetic Retinopathy: Controlling Your Risk Factors" ([employer]viewMES) 08/26/16    Self  Care    _x_  Review with and provide education materials to pt. Self Care - "Diabetes: Keeping Your Feet Healthy" (JOCELYNEMES) 07/22/16     _x_ Review with and provide education materials to pt. Self Care - "Your Diabetes Foot Care Program"  (mobintent) 08/26/16 09/09/16   _x_ Review with and provide education materials to pt. Self Care - "Diabetes: Caring For Your Body" (JOCELYNEMES) 10/10/16   _x_ Review with and provide education materials to pt. Self Care - "Diabetes: Sick-Day Plan" (mobintent) 08/11/16 09/09/16 12/05/16 01/05/17   __ Review with and provide education materials to pt. Self Care - "Diabetes: Driving Issues" (mobintent)    __ Review with and provide education materials to pt. Self Care - "Diabetes: Living Your Life" (mobintent)    __ Review with and provide education materials to pt. Self Care - "Getting Support When You Have Diabetes" (InterviewMES)    _x_ Review with and provide education materials to pt. Self Care - "Managing Stress When You Have Diabetes" ([employer]view[employer]ES) 09/23/16 11/03/16 01/05/17   _x_ Review with and provide education materials to pt. Self Care - "Planning for Travel When You Have Diabetes" ([employer]viewMES) 08/11/16   __ Review with and provide education materials to pt. Types of Diabetes: "Your Diabetes Toolkit" (mobintent)      " "    Referrals/Management    __ Facilitate referral to diabetic class    __ Facilitate obtaining DM supplies    __ Facilitate referral to podiatrist    __ Monitor DM management (HgA1c, dental exam 2xs/yr, annual flu shot, annual eye exam, annual comprehensive foot exam)    _x_ Encourage regular medical appointments 07/05/16  08/11/16  09/09/16  09/23/16  10/10/16  12/05/16  01/05/17                             Problem   2  Blood Pressure Control   Short Term Goal   Patient/Caregiver will:   Will measure and record blood pressure daily for 2 weeks..  (_x_Partially met _x__Met __Not met ___ Deferred)   Will verbalize_2_   ways to reduce stress in _12_ weeks.  (_x_Partially met ___Met __Not met ___ Deferred)   Will verbalize _2_  risk factors within _6_ weeks.  (_x_Partially met _x__Met __Not met ___ Deferred)  Patient/Caregiver will:   Be able to verbalize norvasc________ medication dosage/route frequency within _2_ weeks  (__Partially met _x__Met __Not met ___ Deferred)      Complications and Co-Morbidities  Patient/Caregiver will:    verbalize _2_ long-term complications of hypertension in _4_ weeks  (_x_Partially met _x__Met __Not met ___ Deferred)   Can verbalize _2_ interventions to keep blood pressure under control  in _8  weeks  (_x_Partially met _x__Met __Not met ___ Deferred)     Long Term Goals Patient/Caregiver will check and record blood pressure daily. If > 140/90 for 3 days, will notify Physician.   Goal Met Met  x Partially met   Not Met Deferred    Interventions Date Addressed    _x_ Review and provide education materials with pt. "Controlling High Blood Pressure" (LENNY)   07/05/16  08/11/16  08/26/16  09/23/16  10/10/16  12/05/16  01/05/17     _x_ Review and provide education materials with pt. "Your High Blood Pressure Risk Factors" (LENNY)   08/11/16  10/10/16  12/05/16  01/05/17   _x_ Review and provide education materials with pt. "Taking Your Blood Pressure" (LENNY)   " "07/05/16 07/22/16 09/09/16 12/05/16 01/05/17     _x_ Review and provide education materials with pt. "What is High Blood Pressure" (LENNY)   07/05/16  09/09/16  10/10/16  12/05/16  01/05/17   __ Review and provide education materials with pt. "High Blood Pressure and Peripheral Arterial Disease" (LENNY)      _x_ Encourage medication compliance   07/05/16  09/09/16  08/11/16  08/26/16  09/23/16  10/10/16  12/05/16  01/05/17   _x_ Discuss s/s of hypotension (weakness, dizziness, mental changes, bradycardia, nausea, vomiting)   07/22/16      _x_ Discuss s/s of hypertension and to call MD immediately if /110 or high BP causing headaches or other symptoms.   07/05/16  07/22/16  10/10/16  12/05/16  01/05/17     _x_ Educate Pt/family to predisposing controllable factors   07/05/16  08/11/16  10/10/16  01/05/17     __    __    ___          "

## 2017-01-31 ENCOUNTER — OUTPATIENT CASE MANAGEMENT (OUTPATIENT)
Dept: ADMINISTRATIVE | Facility: OTHER | Age: 52
End: 2017-01-31

## 2017-01-31 NOTE — PROGRESS NOTES
Name Leanna RIZVI 1965 Medical Record  # [id number]   Visit Location __ Home __ Clinic _x_ Telephonic __Other  Encounter Date:  17   Contact Type __ New Case   _x_ Follow Up  __ Monitoring  __ Case Closure Next Follow-up Date Week of   17                     Summary:  27-Called and spoke to patient. Discussed her blood pressure and diabetes. Patient will have her  A1C drawn on 17.Will mail to patient- Your diabetes tool kit, planning for travel when you have diabetes, and eating a high fiber diet. Will follow up with patient in three weeks.     17-Called and spoke to patient. She was lying down because of a sinus headache. Will follow up with patient next week.     17- Called and spoke to patient about her blood pressure, new medications, s/p hernia surgery, constipation, diabetes, pneumonia and her upcoming doctor appts and labs. Patient has an appt with Dr. Gautam, nephrologist on 2017. Patient to have A1C drawn on 17. Sending patient [facility]  financial assistance paperwork. Will follow up with patient in three weeks.     16-Called and spoke to patient. Went over follow up appts with patient after hospital discharge. Will follow up with patient in 4 weeks.     16-Called and spoke to patient. Patient had a sigmoidoscopy yesterday and a polyp removed. Patient has an appt with Dr. Serrano on 11/15/16 to schedule surgery for her hernia repair.Encouraged her to schedule her surgery as soon as possible if she is having any pain.  Patient is taking all her medications. Blood pressure is 127/80. Patient's A1C was 8.0 on 10/22/16. Will mail to patient diabetic holiday party recipes    10/27/16-Called and patient is unable to talk at this time. Asked to be called back next week.     10/31/16- Called and spoke to patient. Went over education with her on getting her labs, diabetic and blood pressure education. Will mail to patient diabetic education on holiday  party recipes. Will follow up with patient after her appt on 10/25/16.    09/23/16-Called and patient has seen several doctors for her abdominal pain and her two inguinal hernias. Patient is having a colonoscopy on 10/05/16. Then patient will talk to surgeon about having surgery if needed. Patient went over her instructions with me for the prep for her colonoscopy, answered all her questions. Patient is taking four blood pressure medications. Talked to patient about handling stress during this time. Will mail patient diabetic education material and blood pressure education material.     09/09/16-Called and spoke to the patient. Patient has been to urgent care, an appt with Dr. Dial and to the ER. Patient has a cough from bronchitis and her blood pressure was high when she went to the ER. Patient is now on four blood pressure medications. Patient asking if she should be on all these blood pressure medications. Patient states that her labs did not show she had a UTI but she was given an antibiotic. Patient is drinking water and cranberry juice.  Patient is taking all her medications. Patient does have a new blood pressure cuff. Her blood pressure this am was 136/79. Patient is aware if her blood pressure gets too high she could have a stroke. Patient is having some stress in her life. Patient's blood glucose this am was 125. Will cont to follow patient for needs.     08/26/16-Called and spoke to patient about her blood pressure, bronchitis and diabetes. Patient is not having any signs or symptoms of bronchitis. Went over educational material with patient on blood pressure and diabetes. Patient's  has gone back to work after being laid off from another job but they are not having any financial difficulties. Patient blood glucose this am was 123. Patient wants to start exercising again but her back is bothering her. Patient is going to get an eye appt when she gets the money for an appt. Patient is trying to  follow a low sodium/diabetic diet. Patient's next A1c is in October and she can not wait to see if her A1C is lower.  Will mail patient information on diabetic and blood pressure education.     08/11/16- Patient received all the diabetic information sent to her. Went over the material and answered questions. Patient had an appt with her PCP on 08/08/16. Patient was given a celestone injection, rocephin injection and a RX for cough syrup. Patient states that she is much better. Patient's blood glucose did run high after the celestone injection but now is running anywhere from 70 to 120. Patient is taking all of her medication and patient's blood pressure remains high so the doctor changed her medication. Patient is eating healthy. Discussed A1C with patient. Her next lab to check her A1C is in October. Patient wants to lose weight and start exercising again.  Will send patient diabetic education and blood pressure education material to patient.     08/04/16-Called and left message for patient to call back.     07/22/16-Called and spoke to patient. Patient's  is out of work at this time. Went over information mailed to patient about diabetes and high blood pressure. Patient needs a new battery for her blood pressure machine. Patient's blood glucose this am was 113. Patient feels like she does not need a dietician at this time since she is taking glipzide. Patient feels like her diabetes is under control with this new medication. Went over diabetes distress scale questions with patient. Will mail patient diabetic education material and blood pressure education.     07/18/16-[facility] Will follow up with  patient another day.     07/05/16-Called and spoke to Leanna García, 50 year old female. Patient is independent with her ADL's and does not have home health. Patient does not drive. Patient checks her blood pressure and blood glucose one time a day and keeps logs. Patient was changed from  "levemir to glipizide because she could not afford the insulin. Patient has no problems with paying for her medications at this time. Went over signs and symptoms of a stroke with patient. Patient eats three meals a day with snacks. Patient does have back pain at times because of her DDD and scoliosis per patient. Patient and her  went camping this weekend. Will send patient resource material for diabetes, hypertension, OPCM brochure and my card. Case Management will cont to follow for needs.       Plan:  Problem 3-Discharge hospital instructions -- MET   Problem 1- Diabetes education  Problem 2- Blood Pressure education       Problem  1    Discharge Instructions - Recent Hospitalization   Short Term Goal Patient/Caregiver will:   Be able to verbalize _amoxicillin and colace/metamucil _______ medication dosage/route frequency within _now_.  (__Partially met _x__Met __Not met ___ Deferred)     Will verbalize _2_  interventions of preventing complications due to disease process in now  (__Partially met _x__Met __Not met ___ Deferred)         Long Term Goals Ensure patients who transition from one site of care to the next have the tools and knowledge to be successful with on-going self management.      Goal Met _x_Yes  __No   Reason:      Interventions Date Addressed    x__Compare pre-hospital medications with medications on hospital discharge list 12/05/16   _x_ Identify medications that were prescribed but not obtained   12/05/16   __ Identify medication discrepancies and develop a plan to resolve discrepancies      _x_ Answer questions about medications   12/05/16   __ Alert patient to potential adverse drug reaction(s)      __ Encourage  use of patient's medication management "system"       __ Identify medications needing refills and/or barriers to refill    _x_ Encourage patient to set up follow-up appointment   12/05/16   _x_ Discuss questions with patient for PCP or specialist visit   12/05/16   __ " Clarify whether patient will need to obtain follow up tests and/or results      __ Provide teaching for how to obtain follow-up tests and results      _x_ Review discharge instructions   12/05/16   _x_ Discuss patients conditions and self management of condition(s)    12/05/16   _x_ Discuss target symptoms / side effects to monitor and what to do should they arise   12/05/16   _x_ Discuss when PCP should be called   12/05/16   _x_Discuss pain management.    12/05/16   _x_ Discuss constipation    12/05/16   __ Discuss patient's personal goal and possible steps for achieving      __ Ensure Durable Medical Equipment is delivered      __ Ensure [facility] is involved if appropriate      _x_ Determine adequacy of support system and need for ongoing case management   12/05/16   __ Connect patient to necessary community resources      __    __    __        Problem   1 Diabetes   Short Term Goal Patient/Caregiver will:   Check and record blood glucose  _1_  times per day for _2_ weeks  (_x_Partially met _x__Met __Not met ___ Deferred)   Verbalize _2_ signs and symptoms of hypo/hyper glycemia _2 _ weeks  (_x_Partially met _x__Met __Not met ___ Deferred)     Verbalize current A1C and understanding of recommended value in _2_ weeks  (_x_Partially met _x__Met __Not met ___ Deferred)      Diet and Meal Planning  Patient/Caregiver will:   Replace _carbohydrates _____________ with _fruits and vegetables ___________ for _2  meals for _2_  Weeks.   (_x_Partially met _x__Met __Not met ___ Deferred)     Choose ____diabetic ______ grocery list items to prepare diet meals in next _2_ weeks  (_x_Partially met _x__Met __Not met ___ Deferred)      Exercise and Weight  Patient/Caregiver will:   Exercise _walk __ for _10 _ minutes a day _3_ times a week  o Example: Yoga, Walk, Jog, etc.  (_x_Partially met _x__Met __Not met ___ Deferred     Weigh Daily/Weekly and record weight for _6_ weeks  (_x_Partially met ___Met __Not met ___  "Deferred)     Gain/Lose _2_ pounds  by Exercising/Walking in _4_ weeks  (_x_Partially met _x__Met __Not met ___ Deferred)    Medications  Patient/Caregiver will:   Be able to verbalize _glucotrol and glucophage_______ medication dosage/route frequency within _2_ weeks  (_x_Partially met _x__Met __Not met ___ Deferred)      Self-Care  Patient/Caregiver will:   Will follow-up with PCP to coordinate referrals to _LABS __________ (Ex: Ophthalmology, Podiatry, Labs, Dietician) within _2 weeks  (_x_Partially met ___Met __Not met ___ Deferred)     Will verbalize _2_  interventions of preventing complications due to disease process in _4_ weeks  (_x_Partially met _x__Met __Not met ___ Deferred)    Complications and Co-Morbidities  Patient/Caregiver will:   Can verbalize _2_ long-term complications of diabetes in _4_ weeks  (_x_Partially met _x__Met __Not met ___ Deferred)   Can verbalize _2_ interventions to keep Feet/Eyes healthy in _4_  weeks  (_xPartially met _x__Met __Not met __ Deferred)     Long Term Goals Patient will get A1C tested every 6 months and is results ? 8 will make appoint with PCP within 2 weeks   Goals Met Partially met  x Not met Deferred    Interventions Date Addressed    __ Review with and provide education materials to pt. Types of Diabetes: "Pre Diabetes" (LENNY)    __ Review with and provide education materials to pt. Types of Diabetes: "What is Type 1 Diabetes?" (LENNY)    _x_ Review with and provide education materials to pt. Types of Diabetes: "What is Type 2 Diabetes?" ([facility]NY) 08/11/16 09/23/16   _x_ Review with and provide education materials to pt. Resources for People with Diabetes (LENNY) 07/22/16 09/23/16     Glucose Monitoring    _x_ Discuss glucometer monitoring  07/05/16  07/22/16  08/11/16  07/26/16  09/09/16  09/23/16  10/10/16  11/03/16  12/05/16  01/05/17  01/31/17   _x_ Educate regarding physicians recommended BS range " "07/05/16  07/22/16  08/11/16  09/09/16  09/23/16  10/10/16  01/05/17  01/31/17   _x_ Review with and provide education materials to pt. Review with and provide education materials to pt. Blood Sugar Management: Hypoglycemia (Low Blood Sugar) ("[employer]) 07/05/16 07/22/16 08/11/16 08/26/16 09/23/16 12/05/16 01/05/17 01/31/17   _x_ Review with and provide education materials to pt. Review with and provide education materials to pt. Blood Sugar Management: Hyperglycemia (High Blood Sugar) ([employer] 07/05/16 07/22/16 01/05/17 01/31/17   _x_ Review with and provide education materials to pt. Blood Sugar Management: Using a Blood Sugar Log  ([employer] 07/05/16 01/31/17   _x_  Review with and provide education materials to pt. Blood Sugar Management: Managing Diabetes: The A1C Test ([employer] 07/05/16 07/22/16 08/11/16 08/26/16 11/03/16 01/05/17 01/31/17   Diet and Meal Planning     _x_ Review with and provide education materials to pt. Diet and Meal Planning - "Healthy Meals for Diabetes" ([employer] 07/05/16  07/22/16  08/11/16  08/26/16  09/23/16  10/10/16  11/03/16  12/05/16  01/05/17  01/31/17   _x Review with and provide education materials to pt. Diet and Meal Planning - "Diabetes: Understanding Carbohydrates"  and Understanding Carbohydrates, Fats and Protein, ("[employer]) 07/05/16      _x_ Review with and provide education materials to pt. Diet and Meal Planning - "Eating Out When You Have Diabetes" ("[employer]) 08/11/16 08/26/16 01/05/17   _x_ Review with and provide education materials to pt. Diet and Meal Planning - "Learning About Serving and Portion Sizes"  ("[employer]) 07/05/16 08/11/16     _x_ Review with and provide education materials to pt. Diet and Meal Planning - "Diabetes: Shopping for and Preparing Meals"  ("[employer]) 07/22/16 08/26/16 11/03/16 01/31/17     _x_ Review with and provide education materials to pt. Diet and Meal Planning - "Diabetes: Learning About Serving and Portion Sizes"  (LENNY) " "12/05/16 01/05/17   _x_ Review with and provide education materials to pt. Diet and Meal Planning - Grocery List   11/03/16   _x_Review with and provide education materials to pt. Review with and provide education materials to pt. Diet and Meal Planning - "Eating a High Fiber Diet"  ([facility] [place] 08/11/16 12/05/16 01/31/17   _x_ Review with and provide education materials to pt. Diet and Meal Planning - "Diabetes: Meal Planning"  ([facility] [place] 08/11/16   __ Review with and provide education materials to pt. Complications & Co morbidities - "Diabetes and Alcohol Consumption" ([facility] [place]    Exercise and Weight    _x_ Review with and provide education materials to pt. Exercise: "Exercise to Manage Your Blood Sugar" ([facility] [place] 07/22/16 08/11/16 08/26/16 01/31/17   _x_ Review with and provide education materials to pt. Exercise: "Diabetes: The Benefits of Exercise" ([employer]) 08/26/16 01/31/17   __ Review with and provide education materials to pt. Exercise: "Before You Start With Diabetes Exercise Plan" ([facility] [place]    _x_ eview with and provide education materials to pt. Exercise: "Diabetes: Getting Started with Exercise" ([employer]) 08/11/16   _x_ Review with and provide education materials to pt. Exercise: "Diabetes: Activity Tips" ([facility] [place] 08/26/16   _x_ Review with and provide education materials to pt. Exercise: "Diabetes: Tracking Your Fitness Progress" ([employer]) 08/26/16   __ Review with and provide education materials to pt. Exercise: "Type 1 Diabetes: Getting Active"  ([employer])     _x_ Review with and provide education materials to pt. Weight Management: "Finding Your Ideal Weight" ([employer]) 08/26/16   Medications    __ Review with and provide education materials to pt. Medications: "Using Injected Insulin" ([employer])    _x_ Review with and provide education materials to pt. Medications: "Oral Therapy for Type 2 Diabetes" ([employer]) 07/05/16 08/11/16 08/26/16 09/09/16 11/03/16       __ Review with and provide education materials " "to pt. Medications: "Types of Insulin" ([facility] [place]    __ Review with and provide education materials to pt. Medications: "Diabetes: Ways to Take Medication"  ([facility] [place]    _x_ Review with and provide education materials to pt. Medications: "Taking Medication for Diabetes"  ([facility] [place] 07/05/16 07/22/16 08/11/16 08/26/16 09/09/16   Complications and Co morbidities    __ Review with and provide education materials to pt. Complications & Co morbidities - "After Leg Amputation: Keeping Your Other Leg Healthy" ([facility] [place]    __ Review with and provide education materials to pt. Complications & Co morbidities - "Diabetic Retinopathy: Controlling Your Risk Factors" ([facility] [place]    _x_ Review with and provide education materials to pt. Complications & Co morbidities - "Long-term Complications of Diabetes" ([facility] [place] 08/11/16 08/26/16 09/09/16   __ Review with and provide education materials to pt. Complications & Co morbidities - "Diabetic Retinopathy: Evaluating Your Eyes"([facility] [place]    _x_ Review with and provide education materials to pt. Types of Diabetes: "Diabetic Retinopathy: Controlling Your Risk Factors" ([facility] [place] 08/26/16    Self  Care    _x_  Review with and provide education materials to pt. Self Care - "Diabetes: Keeping Your Feet Healthy" ([employer]) 07/22/16     _x_ Review with and provide education materials to pt. Self Care - "Your Diabetes Foot Care Program"  ([facility] [place] 08/26/16 09/09/16   _x_ Review with and provide education materials to pt. Self Care - "Diabetes: Caring For Your Body" ([facility]NY) 10/10/16   _x_ Review with and provide education materials to pt. Self Care - "Diabetes: Sick-Day Plan" ("[employer] 08/11/16 09/09/16 12/05/16 01/05/17   __ Review with and provide education materials to pt. Self Care - "Diabetes: Driving Issues" ([employer])    __ Review with and provide education materials to pt. Self Care - "Diabetes: Living Your Life" ("[employer]    __ Review with and provide education materials to pt. Self Care - " ""Getting Support When You Have Diabetes" ([facility]NY)    _x_ Review with and provide education materials to pt. Self Care - "Managing Stress When You Have Diabetes" ([employer]) 09/23/16 11/03/16 01/05/17   _x_ Review with and provide education materials to pt. Self Care - "Planning for Travel When You Have Diabetes" ([employer]) 08/11/16   _x_ Review with and provide education materials to pt. Types of Diabetes: "Your Diabetes Toolkit" ([employer]) 01/31/17         Referrals/Management    __ Facilitate referral to diabetic class    __ Facilitate obtaining DM supplies    __ Facilitate referral to podiatrist    __ Monitor DM management (HgA1c, dental exam 2xs/yr, annual flu shot, annual eye exam, annual comprehensive foot exam)    _x_ Encourage regular medical appointments 07/05/16  08/11/16  09/09/16  09/23/16  10/10/16  12/05/16  01/05/17  01/31/17                             Problem   2  Blood Pressure Control   Short Term Goal   Patient/Caregiver will:   Will measure and record blood pressure daily for 2 weeks..  (_x_Partially met _x__Met __Not met ___ Deferred)   Will verbalize_2_   ways to reduce stress in _12_ weeks.  (_x_Partially met __x_Met __Not met ___ Deferred)   Will verbalize _2_  risk factors within _6_ weeks.  (_x_Partially met _x__Met __Not met ___ Deferred)  Patient/Caregiver will:   Be able to verbalize norvasc________ medication dosage/route frequency within _2_ weeks  (__Partially met _x__Met __Not met ___ Deferred)      Complications and Co-Morbidities  Patient/Caregiver will:    verbalize _2_ long-term complications of hypertension in _4_ weeks  (_x_Partially met _x__Met __Not met ___ Deferred)   Can verbalize _2_ interventions to keep blood pressure under control  in _8  weeks  (_x_Partially met _x__Met __Not met ___ Deferred)     Long Term Goals Patient/Caregiver will check and record blood pressure daily. If > 140/90 for 3 days, will notify Physician.   Goal Met Met  x Partially met   Not Met " "Deferred    Interventions Date Addressed    _x_ Review and provide education materials with pt. "Controlling High Blood Pressure" ([facility])   07/05/16  08/11/16  08/26/16  09/23/16  10/10/16  12/05/16  01/05/17  01/31/17     _x_ Review and provide education materials with pt. "Your High Blood Pressure Risk Factors" ([facility])   08/11/16  10/10/16  12/05/16  01/05/17  01/31/17   _x_ Review and provide education materials with pt. "Taking Your Blood Pressure" ([facility])   07/05/16 07/22/16 09/09/16 12/05/16 01/05/17 01/31/17     _x_ Review and provide education materials with pt. "What is High Blood Pressure" ([facility])   07/05/16  09/09/16  10/10/16  12/05/16  01/05/17  01/31/17   __ Review and provide education materials with pt. "High Blood Pressure and Peripheral Arterial Disease" ([facility])      _x_ Encourage medication compliance   07/05/16  09/09/16  08/11/16  08/26/16  09/23/16  10/10/16  12/05/16  01/05/17  01/31/17   _x_ Discuss s/s of hypotension (weakness, dizziness, mental changes, bradycardia, nausea, vomiting)   07/22/16      _x_ Discuss s/s of hypertension and to call MD immediately if /110 or high BP causing headaches or other symptoms.   07/05/16  07/22/16  10/10/16  12/05/16  01/05/17  01/31/17     _x_ Educate Pt/family to predisposing controllable factors   07/05/16  08/11/16  10/10/16  01/05/17     __    __    ___          "

## 2017-02-04 ENCOUNTER — LAB VISIT (OUTPATIENT)
Dept: LAB | Facility: HOSPITAL | Age: 52
End: 2017-02-04
Attending: FAMILY MEDICINE
Payer: MEDICARE

## 2017-02-04 DIAGNOSIS — N18.30 CKD (CHRONIC KIDNEY DISEASE) STAGE 3, GFR 30-59 ML/MIN: ICD-10-CM

## 2017-02-04 DIAGNOSIS — Z11.4 ENCOUNTER FOR SCREENING FOR HIV: ICD-10-CM

## 2017-02-04 DIAGNOSIS — Z29.9 PREVENTIVE MEASURE: ICD-10-CM

## 2017-02-04 LAB
25(OH)D3+25(OH)D2 SERPL-MCNC: 24 NG/ML
ANION GAP SERPL CALC-SCNC: 10 MMOL/L
BUN SERPL-MCNC: 54 MG/DL
CALCIUM SERPL-MCNC: 10 MG/DL
CHLORIDE SERPL-SCNC: 106 MMOL/L
CO2 SERPL-SCNC: 24 MMOL/L
CREAT SERPL-MCNC: 1.4 MG/DL
EST. GFR  (AFRICAN AMERICAN): 50.2 ML/MIN/1.73 M^2
EST. GFR  (NON AFRICAN AMERICAN): 43.5 ML/MIN/1.73 M^2
GLUCOSE SERPL-MCNC: 205 MG/DL
POTASSIUM SERPL-SCNC: 4.9 MMOL/L
SODIUM SERPL-SCNC: 140 MMOL/L

## 2017-02-04 PROCEDURE — 86592 SYPHILIS TEST NON-TREP QUAL: CPT

## 2017-02-04 PROCEDURE — 80048 BASIC METABOLIC PNL TOTAL CA: CPT

## 2017-02-04 PROCEDURE — 86703 HIV-1/HIV-2 1 RESULT ANTBDY: CPT

## 2017-02-04 PROCEDURE — 83036 HEMOGLOBIN GLYCOSYLATED A1C: CPT

## 2017-02-04 PROCEDURE — 82306 VITAMIN D 25 HYDROXY: CPT

## 2017-02-04 PROCEDURE — 36415 COLL VENOUS BLD VENIPUNCTURE: CPT | Mod: PO

## 2017-02-06 LAB
ESTIMATED AVG GLUCOSE: 226 MG/DL
HBA1C MFR BLD HPLC: 9.5 %
HIV 1+2 AB+HIV1 P24 AG SERPL QL IA: NEGATIVE
RPR SER QL: NORMAL

## 2017-02-08 ENCOUNTER — HOSPITAL ENCOUNTER (EMERGENCY)
Facility: HOSPITAL | Age: 52
Discharge: HOME | End: 2017-02-08
Attending: EMERGENCY MEDICINE
Payer: MEDICARE

## 2017-02-08 VITALS
HEIGHT: 67 IN | HEART RATE: 93 BPM | TEMPERATURE: 98 F | OXYGEN SATURATION: 94 % | RESPIRATION RATE: 16 BRPM | SYSTOLIC BLOOD PRESSURE: 158 MMHG | BODY MASS INDEX: 29.82 KG/M2 | WEIGHT: 190 LBS | DIASTOLIC BLOOD PRESSURE: 92 MMHG

## 2017-02-08 DIAGNOSIS — R11.10 VOMITING, INTRACTABILITY OF VOMITING NOT SPECIFIED, PRESENCE OF NAUSEA NOT SPECIFIED, UNSPECIFIED VOMITING TYPE: ICD-10-CM

## 2017-02-08 DIAGNOSIS — G43.909 MIGRAINE WITHOUT STATUS MIGRAINOSUS, NOT INTRACTABLE, UNSPECIFIED MIGRAINE TYPE: Primary | ICD-10-CM

## 2017-02-08 DIAGNOSIS — R51.9 HEADACHE: ICD-10-CM

## 2017-02-08 DIAGNOSIS — R05.9 COUGH: ICD-10-CM

## 2017-02-08 PROCEDURE — 99284 EMERGENCY DEPT VISIT MOD MDM: CPT | Mod: 25

## 2017-02-08 PROCEDURE — 96376 TX/PRO/DX INJ SAME DRUG ADON: CPT

## 2017-02-08 PROCEDURE — 96361 HYDRATE IV INFUSION ADD-ON: CPT

## 2017-02-08 PROCEDURE — 96375 TX/PRO/DX INJ NEW DRUG ADDON: CPT

## 2017-02-08 PROCEDURE — 96374 THER/PROPH/DIAG INJ IV PUSH: CPT

## 2017-02-08 PROCEDURE — 25000003 PHARM REV CODE 250: Performed by: EMERGENCY MEDICINE

## 2017-02-08 PROCEDURE — 63600175 PHARM REV CODE 636 W HCPCS: Performed by: EMERGENCY MEDICINE

## 2017-02-08 RX ORDER — PROCHLORPERAZINE EDISYLATE 5 MG/ML
10 INJECTION INTRAMUSCULAR; INTRAVENOUS
Status: COMPLETED | OUTPATIENT
Start: 2017-02-08 | End: 2017-02-08

## 2017-02-08 RX ORDER — MORPHINE SULFATE 10 MG/ML
5 INJECTION INTRAMUSCULAR; INTRAVENOUS; SUBCUTANEOUS
Status: COMPLETED | OUTPATIENT
Start: 2017-02-08 | End: 2017-02-08

## 2017-02-08 RX ORDER — MORPHINE SULFATE 2 MG/ML
6 INJECTION, SOLUTION INTRAMUSCULAR; INTRAVENOUS
Status: COMPLETED | OUTPATIENT
Start: 2017-02-08 | End: 2017-02-08

## 2017-02-08 RX ORDER — ONDANSETRON 4 MG/1
4 TABLET, FILM COATED ORAL EVERY 6 HOURS PRN
Qty: 12 TABLET | Refills: 0 | Status: SHIPPED | OUTPATIENT
Start: 2017-02-08 | End: 2017-02-15

## 2017-02-08 RX ORDER — KETOROLAC TROMETHAMINE 30 MG/ML
15 INJECTION, SOLUTION INTRAMUSCULAR; INTRAVENOUS
Status: COMPLETED | OUTPATIENT
Start: 2017-02-08 | End: 2017-02-08

## 2017-02-08 RX ORDER — BUTALBITAL, ASPIRIN, CAFFEINE AND CODEINE PHOSPHATE 50; 325; 40; 30 MG/1; MG/1; MG/1; MG/1
1 CAPSULE ORAL EVERY 4 HOURS PRN
Qty: 20 CAPSULE | Refills: 0 | Status: SHIPPED | OUTPATIENT
Start: 2017-02-08 | End: 2017-02-16 | Stop reason: SDUPTHER

## 2017-02-08 RX ADMIN — SODIUM CHLORIDE 1000 ML: 0.9 INJECTION, SOLUTION INTRAVENOUS at 03:02

## 2017-02-08 RX ADMIN — PROCHLORPERAZINE EDISYLATE 10 MG: 5 INJECTION INTRAMUSCULAR; INTRAVENOUS at 03:02

## 2017-02-08 RX ADMIN — KETOROLAC TROMETHAMINE 15 MG: 30 INJECTION, SOLUTION INTRAMUSCULAR; INTRAVENOUS at 04:02

## 2017-02-08 RX ADMIN — MORPHINE SULFATE 6 MG: 2 INJECTION, SOLUTION INTRAMUSCULAR; INTRAVENOUS at 04:02

## 2017-02-08 RX ADMIN — MORPHINE SULFATE 5 MG: 10 INJECTION INTRAVENOUS at 03:02

## 2017-02-08 NOTE — DISCHARGE INSTRUCTIONS
"    Vomiting (Adult)  Vomiting is a common symptom that may be due to different causes. These include gastroenteritis ("stomach flu"), food poisoning and gastritis. There are other more serious causes of vomiting which may be hard to diagnose early in the illness. Therefore, it is important to watch for the warning signs listed below.  The main danger from repeated vomiting is dehydration. This is due to excess loss of water and minerals from the body. When this occurs, body fluids must be replaced.  Home care  · If symptoms are severe, rest at home for the next 24 hours.  · Because your symptoms may be from an infection, wash your hands frequently and well, and use alcohol-based  to avoid spreading the infection to others.  · Wash your hands for at least 20 seconds. Hum the happy birthday song twice for the correct length of time.  · Wash your hands after using the toilet, before and after preparing food, before eating food, after changing a diaper, cleaning a wound, caring for a sick person, and blowing your nose, coughing, or sneezing. You should also wash your hands after caring for someone who is sick, touching pet food, or treats, and touching an animal, or animal waste.  · You may use acetaminophen or NSAID medicines like ibuprofen or naproxen to control fever, unless another medicine was prescribed. If you have chronic liver or kidney disease or ever had a stomach ulcer or GI bleeding, talk with your doctor before using these medicines. Aspirin should never be used in anyone under 18 years of age who is ill with a fever. It may cause severe liver damage. Don't use NSAID medicines if you are already taking one for another condition (like arthritis) or are on aspirin (such as for heart disease, or after a stroke)  · Avoid tobacco and alcohol use, which may worsen your symptoms.  · If medicines for vomiting were prescribed, take as directed.  · Once vomiting stops, then follow these guidelines:  During " the first 12 to 24 hours follow the diet below:  · Fruit juices. Apple, grape juice, clear fruit drinks, and electrolyte replacement drinks.  · Beverages. Soft drinks without caffeine; mineral water (plain or flavored), decaffeinated tea and coffee.  · Soups. Clear broth, consommé and bouillon  · Desserts. Plain gelatin, popsicles and fruit juice bars. As you feel better, you may add 6-8 ounces of yogurt per day.  During the next 24 hours you may add the following to the above:  · Hot cereal, plain toast, bread, rolls, crackers  · Plain noodles, rice, mashed potatoes, chicken noodle or rice soup  · Unsweetened canned fruit (avoid pineapple), bananas  · Limit caffeine and chocolate. No spices or seasonings except salt.  During the next 24 hours:  Gradually resume a normal diet, as you feel better and your symptoms lessen.  Follow-up care  Follow up with your healthcare provider, or as advised.  When to seek medical advice  Call your healthcare provider right away if any of these occur:  · Constant right-sided lower abdominal pain or increasing general abdominal pain  · Continued vomiting (unable to keep liquids down) for 24 hours  · Frequent diarrhea (more than 5 times a day); blood (red or black color) or mucus in diarrhea  · Reduced urine output or extreme thirst  · Weakness, dizziness or fainting  · Unusually drowsy or confused  · Fever of 100.4°F (38°C) oral or higher, or as directed  · Yellow color of the eyes or skin  Date Last Reviewed: 11/16/2015 © 2000-2016 [employer]. [address] [place] [address], [place] [address]. All rights reserved. This information is not intended as a substitute for professional medical care. Always follow your healthcare professional's instructions.        Migraine Headache  This often severe type of headache is different from other types of headaches in that symptoms other than pain occur with the headache. Nausea and vomiting, lightheadedness, sensitivity to light  (photophobia), and other visual disturbances are common migraine symptoms. The pain may last from a few hours to several days. It is not clear why migraines occur but certain factors called triggers can raise the risk of having a migraine attack. A migraine may be triggered by emotional stress or depression, or by hormone changes during the menstrual cycle. Other triggers include birth control pills, overuse of migraine medicines, alcohol or caffeine, foods with tyramine (such as aged cheese and wine), eyestrain, weather changes, missed meals, or too little or too much sleep.  Home care  Follow these tips when taking care of yourself at home:  · Dont drive yourself home if you were given pain medicine for your headache or are having visual symptoms. Instead, have someone else drive you home. Try to sleep when you get home. You should feel much better when you wake up.  · Cold can help ease migraine symptoms. Put an ice pack on your forehead or at the base of your skull. Put heat on the back of your neck to help ease any neck spasm.  · Drink only clear liquids or eat a light diet until your symptoms get better. This will help you avoid nausea and vomiting.  How to prevent migraines  Pay attention to what seems to trigger your headache. Try to avoid the triggers when you can. If you have frequent headaches, consider keeping a headache diary. In it, write down what you were doing, feeling, or eating in the hours before each headache. Show this to your healthcare provider to help find the cause of your headaches.  If stress seems to be a trigger for your headaches, figure out what is causing stress in your life. Learn new ways to handle your stress. Ideas include regular exercise, biofeedback, self-hypnosis, yoga, and meditation. Talk with your healthcare provider to find out more information about managing stress. Many books and digital media are also available on this subject.  Tyramine is a substance found in many  foods. It can trigger a migraine in some people. These foods contain tyramine:  · Chocolate  · Yogurt  · All cheeses, but especially aged cheeses  · Smoked or pickled fish and meat, including herring, caviar, bologna, pepperoni, and salami  · Liver  · Avocados  · Bananas  · Figs  · Raisins  · Red wine  Try staying away from these foods for 1 to 2 months to see if you have fewer headaches.  How to treat future headaches  · Take time out at the first sign of a headache, if possible. Find a quiet, dark, comfortable place to sit or lie down. Let yourself relax or sleep.  · Put an ice pack on your forehead or on the area of greatest pain. A heating pad and massage may help if you are having a muscle spasm and tightness in your neck.  · If you have been prescribed a medicine to stop a migraine headache, use this at the first warning sign of the headache for best results. First signs may be an aura or pain.  · If you need to take medicine often for your migraine, talk with your healthcare provider about other ways to prevent your headaches.  Follow-up care  Follow up with your healthcare provider, or as advised. Talk with your provider if you have frequent headaches. He or she can figure out a treatment plan. Ask if you can have medicine to take at home the next time you get a bad headache. This may keep you from having to visit the emergency department in the future. You may need to see a headache specialist (neurologist) if you continue to have headaches.  When to seek medical advice  Call your healthcare provider right away if any of these occur:  · Your head pain gets worse, or doesnt get better within 24 hours  · You cant keep liquids down (repeated vomiting)  · Pain in your sinuses, ears, or throat  · Fever of 100.4º F (38º C) or higher, or as directed by your healthcare provider  · Stiff neck  · Extreme drowsiness, confusion, or fainting  · Dizziness, or dizziness with spinning sensation (vertigo)  · Weakness in  an arm or leg, or on one side of your face  · Difficulty talking or seeing  Date Last Reviewed: 8/1/2016  © 4184-7074 [employer]. [address] [place] [address], [place] [address]. All rights reserved. This information is not intended as a substitute for professional medical care. Always follow your healthcare professional's instructions.

## 2017-02-08 NOTE — ED PROVIDER NOTES
Chief complaint:  Headache (pt c/o headache for several days, pt reports frequent headaches similiar but usually relieved with ibuprofen)      HPI:  Leanna García is a 51 y.o. female presenting with acute onset of a severe headache that started several days ago.  She states she has a history of similar but much milder headaches that usually get better with ibuprofen.  About 3 days ago she developed this headache which started gradually but has since then gotten much worse.  She describes it as severe aching discomfort throughout her entire head bilaterally.  She now has had some nausea and vomiting.  No fevers or chills.  No thunderclap headache.  No numbness or weakness.  No visual changes.    ROS: As per HPI and below:  Constitutional:  No fevers, no chills  Eyes: no visual changes  Cardiac: no chest pain, cough  Respiratory: no shortness of breath  Abdominal: no abdominal pain,  nausea,  vomiting  Genitourinary: No dysuria, no frequency  Skin: no rash  Heme: no bleeding  Musculoskeletal: no joint pain  Neuro: no focal numbness, no focal weakness, headache  Pyschological: no depression      Review of patient's allergies indicates:   Allergen Reactions    Dilaudid [hydromorphone (bulk)] Nausea And Vomiting     Severe GI upset    Lortab [hydrocodone-acetaminophen] Itching       No current facility-administered medications on file prior to encounter.      Current Outpatient Prescriptions on File Prior to Encounter   Medication Sig Dispense Refill    albuterol (PROVENTIL) 2.5 mg /3 mL (0.083 %) nebulizer solution Take 2.5 mg by nebulization 2 (two) times daily as needed for Wheezing.      albuterol 90 mcg/actuation inhaler Inhale 2 puffs into the lungs 4 (four) times daily. 1 Inhaler 1    aspirin (ECOTRIN) 81 MG EC tablet Take 81 mg by mouth once daily.      atorvastatin (LIPITOR) 80 MG tablet Take 1 tablet (80 mg total) by mouth once daily. 90 tablet 3    blood sugar diagnostic [employer] Glucometer  Strips.  Testing Daily. 100 each 4    escitalopram oxalate (LEXAPRO) 10 MG tablet Take 1 tablet (10 mg total) by mouth every evening. 90 tablet 3    fenofibrate 160 MG Tab Take 1 tablet (160 mg total) by mouth once daily. 90 tablet 3    fish oil-omega-3 fatty acids 300-1,000 mg capsule Take 2 g by mouth once daily. Stop taking until after surgery.      furosemide (LASIX) 40 MG tablet Take 1 tablet (40 mg total) by mouth once daily. 1/2 tablet daily 45 tablet 0    glipiZIDE (GLUCOTROL) 10 MG tablet Take 1 tablet (10 mg total) by mouth 2 (two) times daily before meals. 180 tablet 3    hydrochlorothiazide (HYDRODIURIL) 12.5 MG Tab Take 12.5 mg by mouth once daily.      lancets Misc Use to test blood sugar daily    True Results  DX:E11.9 100 each 3    losartan (COZAAR) 100 MG tablet Take 1 tablet (100 mg total) by mouth once daily. 90 tablet 3    metformin (GLUCOPHAGE-XR) 750 MG 24 hr tablet Take 1 tablet (750 mg total) by mouth 2 (two) times daily with meals. 180 tablet 3    metoprolol tartrate (LOPRESSOR) 25 MG tablet Take 1 tablet (25 mg total) by mouth 2 (two) times daily. 60 tablet 11    nifedipine (PROCARDIA-XL) 60 MG (OSM) 24 hr tablet Take 1 tablet (60 mg total) by mouth once daily. 90 tablet 0    polyethylene glycol (GLYCOLAX) 17 gram/dose powder Take 17 g by mouth once daily. 2 x daily for 2 weeks then once daily for 4 weeks, hold if loose bm and decrease to every other day 510 g 2    TRUEPLUS LANCETS 33 gauge Misc       [DISCONTINUED] clorazepate (TRANXENE) 3.75 MG Tab Take 7.5 mg by mouth nightly as needed.       [DISCONTINUED] omeprazole (PRILOSEC) 40 MG capsule Take 1 capsule (40 mg total) by mouth once daily. 90 capsule 0       PMH:  As per HPI and below:  Past Medical History   Diagnosis Date    A-fib      resolved per patient    Arthritis     Bronchitis     Diabetes mellitus     Diabetes mellitus type II     Encounter for blood transfusion     Hyperlipidemia     Hypertension       Past Surgical History   Procedure Laterality Date    Hysterectomy      Colonoscopy N/A 10/5/2016     Procedure: COLONOSCOPY;  Surgeon: Pillo Chanel MD;  Location: [facility];  Service: Endoscopy;  Laterality: N/A;    Hernia repair Bilateral 11/22/2016     inguinal       Social History     Social History    Marital status:      Spouse name: N/A    Number of children: N/A    Years of education: N/A     Social History Main Topics    Smoking status: Never Smoker    Smokeless tobacco: Never Used    Alcohol use No    Drug use: No    Sexual activity: Yes     Partners: Male     Other Topics Concern    None     Social History Narrative       Family History   Problem Relation Age of Onset    Hyperlipidemia Mother     Hypertension Mother     Hypertension Father     Diabetes Father     Diabetes Sister     Diabetes Sister     Diabetes Maternal Aunt     Diabetes Maternal Grandmother     Heart disease Maternal Grandmother     Cancer Paternal Grandmother        Physical Exam:    Vitals:    02/08/17 0414   BP: (!) 158/92   Pulse: 93   Resp:    Temp:      Constitutional: Well-nourished, well-developed, in no acute distress, not cachectic  Eyes: PERRLA, EOMI, normal conjunctiva, normal sclera  ENT: Moist Mucous membranes  Respiratory: Clear to auscultation bilaterally, no wheezes, no crackles, no rhonchi  Cardiovascular: Regular rate and rhythm, no murmurs, no rubs, no gallops  Abdominal: Soft, nontender, nondistended, no guarding, no rebound  Musculoskeletal: Normal range of motion, no obvious deformity, normal capillary refill, head atraumatic, neck supple, no meningismus  Skin: no rash, no ecchymosis, no errythema, no discharge  Neurologic: Cranial nerves II through XII intact, no motor deficits, no sensory deficits, no cerebellar deficits  Psychological: Alert, oriented x3, normal affect, normal mood    Orders Placed This Encounter   Procedures    X-Ray Chest PA And Lateral    CT Head Without  Contrast    Insert Saline lock IV       Medications   sodium chloride 0.9% bolus 1,000 mL (0 mLs Intravenous Stopped 2/8/17 0411)   prochlorperazine injection Soln 10 mg (10 mg Intravenous Given 2/8/17 0302)   morphine injection 5 mg (5 mg Intravenous Given 2/8/17 0302)   morphine injection 6 mg (6 mg Intravenous Given 2/8/17 0412)   ketorolac injection 15 mg (15 mg Intravenous Given 2/8/17 0400)         Labs Reviewed - No data to display                ASSESSMENT  1. Migraine without status migrainosus, not intractable, unspecified migraine type    2. Cough    3. Headache    4. Vomiting, intractability of vomiting not specified, presence of nausea not specified, unspecified vomiting type          Disposition:  Discharge    Discharge Medication List as of 2/8/2017  4:32 AM      START taking these medications    Details   codeine-butalbital-ASA-caffeine (BUTALBITAL COMPOUND W/CODEINE) 03--40 mg Cap Take 1 capsule by mouth every 4 (four) hours as needed., Starting 2/8/2017, Until Sat 2/18/17, Print      ondansetron (ZOFRAN) 4 MG tablet Take 1 tablet (4 mg total) by mouth every 6 (six) hours as needed., Starting 2/8/2017, Until Wed 2/15/17, Print           Discharge Medication List as of 2/8/2017  4:32 AM        Discharge Medication List as of 2/8/2017  4:32 AM      STOP taking these medications       clorazepate (TRANXENE) 3.75 MG Tab Comments:   Reason for Stopping:         omeprazole (PRILOSEC) 40 MG capsule Comments:   Reason for Stopping:               MDM  Number of Diagnoses or Management Options  Cough:   Headache:   Migraine without status migrainosus, not intractable, unspecified migraine type:   Vomiting, intractability of vomiting not specified, presence of nausea not specified, unspecified vomiting type:   Diagnosis management comments: Differential diagnosis includes subarachnoid hemorrhage, migraine, tension headache, aspiration    Patient presents after having several days of gradually worsening  headache.  Now she is having a severe headache which is not characteristic for her.  No signs of meningitis.  We'll obtain head CT to rule out subarachnoid or mass.  We'll have her treat as migraine as this is her likely etiology of her pain.  Additionally, we'll obtain a chest x-ray since she has been having some persistent cough and discomfort in her throat after vomiting.    Head CT and x-ray are unremarkable.  Patient headache is significantly improved.  We'll repeat a dose of pain medication and likely discharge home.       Amount and/or Complexity of Data Reviewed  Tests in the radiology section of CPT®: ordered  Discussion of test results with the performing providers: yes (Head CT: No acute process)  Decide to obtain previous medical records or to obtain history from someone other than the patient: yes  Independent visualization of images, tracings, or specimens: yes (Chest x-ray: No acute process  Head CT: No acute process)         Miguelangel Hicks III, MD  02/08/17 2301

## 2017-02-08 NOTE — ED AVS SNAPSHOT
[facility] [address]  [place] [address]               Leanna García   2017  2:36 AM   ED    Description:  Female : 1965   Department:  [facility]           Your Care was Coordinated By:     Provider Role From To    Miguelangel Hicks III, MD Attending Provider 17 0240 --      Reason for Visit     Headache           Diagnoses this Visit        Comments    Migraine without status migrainosus, not intractable, unspecified migraine type    -  Primary     Cough         Headache         Vomiting, intractability of vomiting not specified, presence of nausea not specified, unspecified vomiting type           ED Disposition     None           To Do List           Follow-up Information     Follow up with Nolberto Lomax MD. Schedule an appointment as soon as possible for a visit in 3 days.    Specialty:  Family Medicine    Contact information:    [facility] [address]  [phone]         These Medications        Disp Refills Start End    codeine-butalbital-ASA-caffeine (BUTALBITAL COMPOUND W/CODEINE) 10--40 mg Cap 20 capsule 0 2017    Take 1 capsule by mouth every 4 (four) hours as needed. - Oral    Pharmacy: [facility] [place] [id number] [facility] Ph #: [phone]       ondansetron (ZOFRAN) 4 MG tablet 12 tablet 0 2017 2/15/2017    Take 1 tablet (4 mg total) by mouth every 6 (six) hours as needed. - Oral    Pharmacy: [facility] [address] [facility] Ph #: [phone]         [facility]     [facility] -  Assistance  Registered nurses in the [facility] provide clinical advisement, health education, appointment booking, and other advisory services.  Call for this free service at [phone].             Medications           Message regarding Medications     Verify the changes and/or  additions to your medication regime listed below are the same as discussed with your clinician today.  If any of these changes or additions are incorrect, please notify your healthcare provider.        START taking these NEW medications        Refills    codeine-butalbital-ASA-caffeine (BUTALBITAL COMPOUND W/CODEINE) 92--40 mg Cap 0    Sig: Take 1 capsule by mouth every 4 (four) hours as needed.    Class: Print    Route: Oral    ondansetron (ZOFRAN) 4 MG tablet 0    Sig: Take 1 tablet (4 mg total) by mouth every 6 (six) hours as needed.    Class: Print    Route: Oral      These medications were administered today        Dose Freq    sodium chloride 0.9% bolus 1,000 mL 1,000 mL ED 1 Time    Sig: Inject 1,000 mLs into the vein ED 1 Time.    Class: Normal    Route: Intravenous    prochlorperazine injection Soln 10 mg 10 mg ED 1 Time    Sig: Inject 2 mLs (10 mg total) into the vein ED 1 Time.    Class: Normal    Route: Intravenous    morphine injection 5 mg 5 mg ED 1 Time    Sig: Inject 0.5 mLs (5 mg total) into the vein ED 1 Time.    Class: Normal    Route: Intravenous    morphine injection 6 mg 6 mg ED 1 Time    Sig: Inject 3 mLs (6 mg total) into the vein ED 1 Time.    Class: Normal    Route: Intravenous    ketorolac injection 15 mg 15 mg ED 1 Time    Sig: Inject 15 mg into the vein ED 1 Time.    Class: Normal    Route: Intravenous      STOP taking these medications     omeprazole (PRILOSEC) 40 MG capsule Take 1 capsule (40 mg total) by mouth once daily.           Verify that the below list of medications is an accurate representation of the medications you are currently taking.  If none reported, the list may be blank. If incorrect, please contact your healthcare provider. Carry this list with you in case of emergency.           Current Medications     albuterol (PROVENTIL) 2.5 mg /3 mL (0.083 %) nebulizer solution Take 2.5 mg by nebulization 2 (two) times daily as needed for Wheezing.    albuterol 90  mcg/actuation inhaler Inhale 2 puffs into the lungs 4 (four) times daily.    aspirin (ECOTRIN) 81 MG EC tablet Take 81 mg by mouth once daily.    atorvastatin (LIPITOR) 80 MG tablet Take 1 tablet (80 mg total) by mouth once daily.    blood sugar diagnostic Strp TrueResult Glucometer Strips.  Testing Daily.    escitalopram oxalate (LEXAPRO) 10 MG tablet Take 1 tablet (10 mg total) by mouth every evening.    fenofibrate 160 MG Tab Take 1 tablet (160 mg total) by mouth once daily.    fish oil-omega-3 fatty acids 300-1,000 mg capsule Take 2 g by mouth once daily. Stop taking until after surgery.    furosemide (LASIX) 40 MG tablet Take 1 tablet (40 mg total) by mouth once daily. 1/2 tablet daily    glipiZIDE (GLUCOTROL) 10 MG tablet Take 1 tablet (10 mg total) by mouth 2 (two) times daily before meals.    hydrochlorothiazide (HYDRODIURIL) 12.5 MG Tab Take 12.5 mg by mouth once daily.    lancets Misc Use to test blood sugar daily    True Results  DX:E11.9    losartan (COZAAR) 100 MG tablet Take 1 tablet (100 mg total) by mouth once daily.    metformin (GLUCOPHAGE-XR) 750 MG 24 hr tablet Take 1 tablet (750 mg total) by mouth 2 (two) times daily with meals.    metoprolol tartrate (LOPRESSOR) 25 MG tablet Take 1 tablet (25 mg total) by mouth 2 (two) times daily.    nifedipine (PROCARDIA-XL) 60 MG (OSM) 24 hr tablet Take 1 tablet (60 mg total) by mouth once daily.    polyethylene glycol (GLYCOLAX) 17 gram/dose powder Take 17 g by mouth once daily. 2 x daily for 2 weeks then once daily for 4 weeks, hold if loose bm and decrease to every other day    TRUEPLUS LANCETS 33 gauge Misc     codeine-butalbital-ASA-caffeine (BUTALBITAL COMPOUND W/CODEINE) 94--40 mg Cap Take 1 capsule by mouth every 4 (four) hours as needed.    ondansetron (ZOFRAN) 4 MG tablet Take 1 tablet (4 mg total) by mouth every 6 (six) hours as needed.           Clinical Reference Information           Your Vitals Were     BP Pulse Temp Resp Height Weight  "   158/92 93 98.2 °F (36.8 °C) (Oral) 16 5' 7" (1.702 m) 86.2 kg (190 lb)    SpO2 BMI             94% 29.76 kg/m2         Allergies as of 2/8/2017        Reactions    Dilaudid [Hydromorphone (Bulk)] Nausea And Vomiting    Severe GI upset    Lortab [Hydrocodone-acetaminophen] Itching      Immunizations Administered on Date of Encounter - 2/8/2017     None      ED Micro, Lab, POCT     None      ED Imaging Orders     Start Ordered       Status Ordering Provider    02/08/17 0251 02/08/17 0251  X-Ray Chest PA And Lateral  1 time imaging      In process     02/08/17 0251 02/08/17 0251  CT Head Without Contrast  1 time imaging      In process         Discharge Instructions           Vomiting (Adult)  Vomiting is a common symptom that may be due to different causes. These include gastroenteritis ("stomach flu"), food poisoning and gastritis. There are other more serious causes of vomiting which may be hard to diagnose early in the illness. Therefore, it is important to watch for the warning signs listed below.  The main danger from repeated vomiting is dehydration. This is due to excess loss of water and minerals from the body. When this occurs, body fluids must be replaced.  Home care  · If symptoms are severe, rest at home for the next 24 hours.  · Because your symptoms may be from an infection, wash your hands frequently and well, and use alcohol-based  to avoid spreading the infection to others.  · Wash your hands for at least 20 seconds. Hum the happy birthday song twice for the correct length of time.  · Wash your hands after using the toilet, before and after preparing food, before eating food, after changing a diaper, cleaning a wound, caring for a sick person, and blowing your nose, coughing, or sneezing. You should also wash your hands after caring for someone who is sick, touching pet food, or treats, and touching an animal, or animal waste.  · You may use acetaminophen or NSAID medicines like ibuprofen " or naproxen to control fever, unless another medicine was prescribed. If you have chronic liver or kidney disease or ever had a stomach ulcer or GI bleeding, talk with your doctor before using these medicines. Aspirin should never be used in anyone under 18 years of age who is ill with a fever. It may cause severe liver damage. Don't use NSAID medicines if you are already taking one for another condition (like arthritis) or are on aspirin (such as for heart disease, or after a stroke)  · Avoid tobacco and alcohol use, which may worsen your symptoms.  · If medicines for vomiting were prescribed, take as directed.  · Once vomiting stops, then follow these guidelines:  During the first 12 to 24 hours follow the diet below:  · Fruit juices. Apple, grape juice, clear fruit drinks, and electrolyte replacement drinks.  · Beverages. Soft drinks without caffeine; mineral water (plain or flavored), decaffeinated tea and coffee.  · Soups. Clear broth, consommé and bouillon  · Desserts. Plain gelatin, popsicles and fruit juice bars. As you feel better, you may add 6-8 ounces of yogurt per day.  During the next 24 hours you may add the following to the above:  · Hot cereal, plain toast, bread, rolls, crackers  · Plain noodles, rice, mashed potatoes, chicken noodle or rice soup  · Unsweetened canned fruit (avoid pineapple), bananas  · Limit caffeine and chocolate. No spices or seasonings except salt.  During the next 24 hours:  Gradually resume a normal diet, as you feel better and your symptoms lessen.  Follow-up care  Follow up with your healthcare provider, or as advised.  When to seek medical advice  Call your healthcare provider right away if any of these occur:  · Constant right-sided lower abdominal pain or increasing general abdominal pain  · Continued vomiting (unable to keep liquids down) for 24 hours  · Frequent diarrhea (more than 5 times a day); blood (red or black color) or mucus in diarrhea  · Reduced urine output  or extreme thirst  · Weakness, dizziness or fainting  · Unusually drowsy or confused  · Fever of 100.4°F (38°C) oral or higher, or as directed  · Yellow color of the eyes or skin  Date Last Reviewed: 11/16/2015 © 2000-2016 [employer]. [address] [place] [address]. All rights reserved. This information is not intended as a substitute for professional medical care. Always follow your healthcare professional's instructions.        Migraine Headache  This often severe type of headache is different from other types of headaches in that symptoms other than pain occur with the headache. Nausea and vomiting, lightheadedness, sensitivity to light (photophobia), and other visual disturbances are common migraine symptoms. The pain may last from a few hours to several days. It is not clear why migraines occur but certain factors called triggers can raise the risk of having a migraine attack. A migraine may be triggered by emotional stress or depression, or by hormone changes during the menstrual cycle. Other triggers include birth control pills, overuse of migraine medicines, alcohol or caffeine, foods with tyramine (such as aged cheese and wine), eyestrain, weather changes, missed meals, or too little or too much sleep.  Home care  Follow these tips when taking care of yourself at home:  · Dont drive yourself home if you were given pain medicine for your headache or are having visual symptoms. Instead, have someone else drive you home. Try to sleep when you get home. You should feel much better when you wake up.  · Cold can help ease migraine symptoms. Put an ice pack on your forehead or at the base of your skull. Put heat on the back of your neck to help ease any neck spasm.  · Drink only clear liquids or eat a light diet until your symptoms get better. This will help you avoid nausea and vomiting.  How to prevent migraines  Pay attention to what seems to trigger your headache. Try to avoid the  triggers when you can. If you have frequent headaches, consider keeping a headache diary. In it, write down what you were doing, feeling, or eating in the hours before each headache. Show this to your healthcare provider to help find the cause of your headaches.  If stress seems to be a trigger for your headaches, figure out what is causing stress in your life. Learn new ways to handle your stress. Ideas include regular exercise, biofeedback, self-hypnosis, yoga, and meditation. Talk with your healthcare provider to find out more information about managing stress. Many books and digital media are also available on this subject.  Tyramine is a substance found in many foods. It can trigger a migraine in some people. These foods contain tyramine:  · Chocolate  · Yogurt  · All cheeses, but especially aged cheeses  · Smoked or pickled fish and meat, including herring, caviar, bologna, pepperoni, and salami  · Liver  · Avocados  · Bananas  · Figs  · Raisins  · Red wine  Try staying away from these foods for 1 to 2 months to see if you have fewer headaches.  How to treat future headaches  · Take time out at the first sign of a headache, if possible. Find a quiet, dark, comfortable place to sit or lie down. Let yourself relax or sleep.  · Put an ice pack on your forehead or on the area of greatest pain. A heating pad and massage may help if you are having a muscle spasm and tightness in your neck.  · If you have been prescribed a medicine to stop a migraine headache, use this at the first warning sign of the headache for best results. First signs may be an aura or pain.  · If you need to take medicine often for your migraine, talk with your healthcare provider about other ways to prevent your headaches.  Follow-up care  Follow up with your healthcare provider, or as advised. Talk with your provider if you have frequent headaches. He or she can figure out a treatment plan. Ask if you can have medicine to take at home the  next time you get a bad headache. This may keep you from having to visit the emergency department in the future. You may need to see a headache specialist (neurologist) if you continue to have headaches.  When to seek medical advice  Call your healthcare provider right away if any of these occur:  · Your head pain gets worse, or doesnt get better within 24 hours  · You cant keep liquids down (repeated vomiting)  · Pain in your sinuses, ears, or throat  · Fever of 100.4º F (38º C) or higher, or as directed by your healthcare provider  · Stiff neck  · Extreme drowsiness, confusion, or fainting  · Dizziness, or dizziness with spinning sensation (vertigo)  · Weakness in an arm or leg, or on one side of your face  · Difficulty talking or seeing  Date Last Reviewed: 8/1/2016  © 2981-9334 [employer]. [address] [place] [address]. All rights reserved. This information is not intended as a substitute for professional medical care. Always follow your healthcare professional's instructions.          Your Scheduled Appointments     Feb 16, 2017 11:20 AM CST   Established Patient Visit with MD [facility]    [address]  [facility] [address]   [phone]            Apr 10, 2017  3:00 PM CDT   New Patient with MD [facility])    [address]  [facility] [address]   [phone]               [facility] complies with applicable Federal civil rights laws and does not discriminate on the basis of race, color, national origin, age, disability, or sex.        Language Assistance Services     ATTENTION: Language assistance services are available, free of charge. Please call [phone].      ATENCIÓN: Si habla sofiañol, tiene a pretty disposición servicios gratuitos de asistencia lingüística. Llame al [phone].     CHÚ Ý: N?u b?n nói Ti?ng Vi?t, có các d?ch v? h? tr? ngôn ng?  mi?n phí dành cho b?n. G?i s? [phone].        Medications Administered     ketorolac injection 15 mg                  morphine injection 5 mg                  morphine injection 6 mg                  prochlorperazine injection Soln 10 mg                  sodium chloride 0.9% bolus 1,000 mL                    Administrations This Visit        Admin Date Action                   ketorolac injection 15 mg 02/08/2017 Given                   Admin Date Action                   morphine injection 5 mg 02/08/2017 Given                   Admin Date Action                   morphine injection 6 mg 02/08/2017 Given                   Admin Date Action                   prochlorperazine injection Soln 10 mg 02/08/2017 Given                   Admin Date Action                   sodium chloride 0.9% bolus 1,000 mL 02/08/2017 New Bag                  Administrations This Visit     ketorolac injection 15 mg     Admin Date Action Dose Route Administered By             02/08/2017 Given 15 mg Intravenous Estela Valdivia RN                    morphine injection 5 mg     Admin Date Action Dose Route Administered By             02/08/2017 Given 5 mg Intravenous Estela Valdivia RN                    morphine injection 6 mg     Admin Date Action Dose Route Administered By             02/08/2017 Given 6 mg Intravenous Estela Valdivia RN                    prochlorperazine injection Soln 10 mg     Admin Date Action Dose Route Administered By             02/08/2017 Given 10 mg Intravenous Estela Valdivia RN                    sodium chloride 0.9% bolus 1,000 mL     Admin Date Action Dose Route Administered By             02/08/2017 New Bag 1000 mL Intravenous Estela Valdivia RN

## 2017-02-08 NOTE — ED NOTES
Patient identifiers for Leanna Garcaí checked and correct.  LOC: Patient is awake, alert, and aware of environment with an appropriate affect. Patient is oriented x 3 and speaking appropriately.  APPEARANCE: Patient resting comfortably and in no acute distress. Patient is clean and well groomed, patient's clothing is properly fastened.  SKIN: The skin is warm and dry. Patient has normal skin turgor and moist mucus membrances. Skin is intact; no bruising or breakdown noted.  MUSCULOSKELETAL: Patient is moving all extremities well, no obvious deformities noted. Pulses intact.   RESPIRATORY: Airway is open and patent. Respirations are spontaneous and non-labored with normal effort and rate.  CARDIAC: Patient has a normal rate and rhythm. No peripheral edema noted. Capillary refill < 3 seconds.  ABDOMEN: No distention noted. Bowel sounds active in all 4 quadrants. Soft and non-tender upon palpation.  NEUROLOGICAL: PERRL. Facial expression is symmetrical. Hand grasps are equal bilaterally. Normal sensation in all extremities when touched with finger. Speech is baseline for patient. Follows commands appropriately, no neurological deficits are noted. Cranial nerves intact. Pt reports front, midline, migraine type pain onset several days ago without relief from OTC Medications  Allergies reported:   Review of patient's allergies indicates:   Allergen Reactions    Dilaudid [hydromorphone (bulk)] Nausea And Vomiting     Severe GI upset    Lortab [hydrocodone-acetaminophen] Itching

## 2017-02-09 ENCOUNTER — TELEPHONE (OUTPATIENT)
Dept: FAMILY MEDICINE | Facility: CLINIC | Age: 52
End: 2017-02-09

## 2017-02-09 NOTE — TELEPHONE ENCOUNTER
----- Message from JANETT Londono sent at 2/9/2017 11:00 AM CST -----  Labs showed poorly controlled diabetes  And vitamin d is low   Follow up as scheduled next week  with Dr Lomax to discuss further   HIV negative and RPR negative

## 2017-02-10 ENCOUNTER — TELEPHONE (OUTPATIENT)
Dept: FAMILY MEDICINE | Facility: CLINIC | Age: 52
End: 2017-02-10

## 2017-02-10 NOTE — TELEPHONE ENCOUNTER
Calls to see if lipid was mistakenly left off of recent lab work; she has appointment next week; had last drawn 12/2016 so may not be appropriate yet, but to discuss with  at appointment. In checking My Chart she sees that RPR and HIV drawn. Says she didn't know anything about these labs being done; explained  does once year, should have had her sign consent, if we didn't I apologized for the concern caused.

## 2017-02-10 NOTE — TELEPHONE ENCOUNTER
----- Message from Anthony Toledo sent at 2/10/2017  9:49 AM CST -----  Contact: pt  Pt is requesting a callback to go over test results  Call Back#[phone]  Thanks

## 2017-02-14 ENCOUNTER — DOCUMENTATION ONLY (OUTPATIENT)
Dept: FAMILY MEDICINE | Facility: CLINIC | Age: 52
End: 2017-02-14

## 2017-02-14 NOTE — PROGRESS NOTES
Pre-Visit Chart Review  For Appointment Scheduled on 2/16/17.    Health Maintenance Due   Topic Date Due    Eye Exam  08/12/1975    TETANUS VACCINE  08/12/1983    Pneumococcal PPSV23 (Medium Risk) (1) 08/12/1983    Mammogram  03/24/2017

## 2017-02-16 ENCOUNTER — OFFICE VISIT (OUTPATIENT)
Dept: FAMILY MEDICINE | Facility: CLINIC | Age: 52
End: 2017-02-16
Payer: MEDICARE

## 2017-02-16 VITALS
WEIGHT: 201.75 LBS | BODY MASS INDEX: 31.66 KG/M2 | HEIGHT: 67 IN | SYSTOLIC BLOOD PRESSURE: 147 MMHG | DIASTOLIC BLOOD PRESSURE: 80 MMHG | TEMPERATURE: 98 F | HEART RATE: 80 BPM

## 2017-02-16 DIAGNOSIS — F41.1 GAD (GENERALIZED ANXIETY DISORDER): ICD-10-CM

## 2017-02-16 DIAGNOSIS — E78.00 HYPERCHOLESTEREMIA: ICD-10-CM

## 2017-02-16 DIAGNOSIS — E11.8 TYPE 2 DIABETES MELLITUS WITH COMPLICATION, WITH LONG-TERM CURRENT USE OF INSULIN: ICD-10-CM

## 2017-02-16 DIAGNOSIS — E11.8 TYPE 2 DIABETES MELLITUS WITH COMPLICATION, WITHOUT LONG-TERM CURRENT USE OF INSULIN: ICD-10-CM

## 2017-02-16 DIAGNOSIS — I15.2 HYPERTENSION ASSOCIATED WITH DIABETES: Chronic | ICD-10-CM

## 2017-02-16 DIAGNOSIS — G43.909 MIGRAINE WITHOUT STATUS MIGRAINOSUS, NOT INTRACTABLE, UNSPECIFIED MIGRAINE TYPE: ICD-10-CM

## 2017-02-16 DIAGNOSIS — E11.59 HYPERTENSION ASSOCIATED WITH DIABETES: Chronic | ICD-10-CM

## 2017-02-16 DIAGNOSIS — Z79.4 TYPE 2 DIABETES MELLITUS WITH COMPLICATION, WITH LONG-TERM CURRENT USE OF INSULIN: ICD-10-CM

## 2017-02-16 DIAGNOSIS — E55.9 VITAMIN D DEFICIENCY: Primary | ICD-10-CM

## 2017-02-16 PROCEDURE — 99999 PR PBB SHADOW E&M-EST. PATIENT-LVL III: CPT | Mod: PBBFAC,,, | Performed by: FAMILY MEDICINE

## 2017-02-16 PROCEDURE — 3046F HEMOGLOBIN A1C LEVEL >9.0%: CPT | Mod: S$GLB,,, | Performed by: FAMILY MEDICINE

## 2017-02-16 PROCEDURE — 3079F DIAST BP 80-89 MM HG: CPT | Mod: S$GLB,,, | Performed by: FAMILY MEDICINE

## 2017-02-16 PROCEDURE — 3077F SYST BP >= 140 MM HG: CPT | Mod: S$GLB,,, | Performed by: FAMILY MEDICINE

## 2017-02-16 PROCEDURE — 3066F NEPHROPATHY DOC TX: CPT | Mod: S$GLB,,, | Performed by: FAMILY MEDICINE

## 2017-02-16 PROCEDURE — 2022F DILAT RTA XM EVC RTNOPTHY: CPT | Mod: S$GLB,,, | Performed by: FAMILY MEDICINE

## 2017-02-16 PROCEDURE — 99214 OFFICE O/P EST MOD 30 MIN: CPT | Mod: S$GLB,,, | Performed by: FAMILY MEDICINE

## 2017-02-16 RX ORDER — METFORMIN HYDROCHLORIDE 750 MG/1
750 TABLET, EXTENDED RELEASE ORAL 2 TIMES DAILY WITH MEALS
Qty: 180 TABLET | Refills: 3 | Status: CANCELLED | OUTPATIENT
Start: 2017-02-16

## 2017-02-16 RX ORDER — GLIPIZIDE 10 MG/1
10 TABLET ORAL
Qty: 180 TABLET | Refills: 3 | Status: SHIPPED | OUTPATIENT
Start: 2017-02-16 | End: 2017-10-26 | Stop reason: SDUPTHER

## 2017-02-16 RX ORDER — METOPROLOL TARTRATE 25 MG/1
25 TABLET, FILM COATED ORAL 2 TIMES DAILY
Qty: 60 TABLET | Refills: 11 | Status: SHIPPED | OUTPATIENT
Start: 2017-02-16 | End: 2017-10-26 | Stop reason: SDUPTHER

## 2017-02-16 RX ORDER — ERGOCALCIFEROL 1.25 MG/1
50000 CAPSULE ORAL
Qty: 12 CAPSULE | Refills: 0 | Status: SHIPPED | OUTPATIENT
Start: 2017-02-16 | End: 2017-05-05 | Stop reason: SDUPTHER

## 2017-02-16 RX ORDER — BUTALBITAL, ACETAMINOPHEN, CAFFEINE AND CODEINE PHOSPHATE 50; 325; 40; 30 MG/1; MG/1; MG/1; MG/1
CAPSULE ORAL
COMMUNITY
Start: 2017-02-10 | End: 2017-03-02 | Stop reason: SDUPTHER

## 2017-02-16 RX ORDER — METFORMIN HYDROCHLORIDE 500 MG/1
1000 TABLET ORAL 2 TIMES DAILY WITH MEALS
Qty: 360 TABLET | Refills: 3 | Status: SHIPPED | OUTPATIENT
Start: 2017-02-16 | End: 2017-10-26 | Stop reason: SDUPTHER

## 2017-02-16 RX ORDER — BUTALBITAL, ASPIRIN, CAFFEINE AND CODEINE PHOSPHATE 50; 325; 40; 30 MG/1; MG/1; MG/1; MG/1
1 CAPSULE ORAL EVERY 4 HOURS PRN
Qty: 30 CAPSULE | Refills: 0 | Status: SHIPPED | OUTPATIENT
Start: 2017-02-16 | End: 2017-02-26

## 2017-02-16 RX ORDER — ESCITALOPRAM OXALATE 10 MG/1
10 TABLET ORAL NIGHTLY
Qty: 90 TABLET | Refills: 3 | Status: SHIPPED | OUTPATIENT
Start: 2017-02-16 | End: 2017-05-17 | Stop reason: ALTCHOICE

## 2017-02-16 RX ORDER — HYDROCHLOROTHIAZIDE 12.5 MG/1
12.5 TABLET ORAL DAILY
Qty: 90 TABLET | Refills: 3 | Status: SHIPPED | OUTPATIENT
Start: 2017-02-16 | End: 2017-05-17 | Stop reason: ALTCHOICE

## 2017-02-16 RX ORDER — FUROSEMIDE 40 MG/1
40 TABLET ORAL DAILY
Qty: 45 TABLET | Refills: 0 | Status: CANCELLED | OUTPATIENT
Start: 2017-02-16

## 2017-02-16 RX ORDER — NIFEDIPINE 60 MG/1
60 TABLET, EXTENDED RELEASE ORAL DAILY
Qty: 90 TABLET | Refills: 0 | Status: SHIPPED | OUTPATIENT
Start: 2017-02-16 | End: 2017-10-26 | Stop reason: SDUPTHER

## 2017-02-16 RX ORDER — LOSARTAN POTASSIUM 100 MG/1
100 TABLET ORAL DAILY
Qty: 90 TABLET | Refills: 3 | Status: SHIPPED | OUTPATIENT
Start: 2017-02-16 | End: 2017-10-26 | Stop reason: SDUPTHER

## 2017-02-16 RX ORDER — LANCETS 33 GAUGE
1 EACH MISCELLANEOUS 3 TIMES DAILY
Qty: 200 EACH | Refills: 11 | Status: SHIPPED | OUTPATIENT
Start: 2017-02-16 | End: 2017-05-02

## 2017-02-16 RX ORDER — FENOFIBRATE 160 MG/1
160 TABLET ORAL DAILY
Qty: 90 TABLET | Refills: 3 | Status: SHIPPED | OUTPATIENT
Start: 2017-02-16 | End: 2017-10-26 | Stop reason: SDUPTHER

## 2017-02-16 RX ORDER — ONDANSETRON 4 MG/1
4 TABLET, ORALLY DISINTEGRATING ORAL EVERY 6 HOURS PRN
Qty: 30 TABLET | Refills: 11 | Status: SHIPPED | OUTPATIENT
Start: 2017-02-16 | End: 2017-05-02

## 2017-02-16 RX ORDER — ATORVASTATIN CALCIUM 80 MG/1
80 TABLET, FILM COATED ORAL DAILY
Qty: 90 TABLET | Refills: 3 | Status: SHIPPED | OUTPATIENT
Start: 2017-02-16 | End: 2017-10-26 | Stop reason: SDUPTHER

## 2017-02-16 NOTE — PROGRESS NOTES
"[facility]  Progress Note    Subjective:       Patient ID: Leanna García is a 51 y.o. female.    Chief Complaint: Hypertension and Diabetes    HPI51 y.o.female is here today for follow-up visit for hypertension and diabetes.  Patient has had hypertension that has been very difficult to control.  Patient's blood pressure readings have been much better than before.  Patient blood pressures systolic were averaging around 180-200.  They have now decreased to 140-160.  Patient has been taking daily morning blood sugars.  Blood sugars have been ranging between 80 and 150.  Patient was recently seen in the emergency room for migraine headache.  Patient was experiencing excruciating throbbing headache associated with nausea.  Patient was given Zofran and fever settled which resolved her symptoms.  All other medical problems are under well control with current medications.  Patient does not have any further complaints at the current time.   Review of Systems   Constitutional: Negative for chills and fever.   HENT: Negative for congestion, sneezing and sore throat.    Eyes: Negative for visual disturbance.   Respiratory: Negative for cough, shortness of breath and wheezing.    Cardiovascular: Negative for chest pain.   Gastrointestinal: Negative for abdominal pain, constipation, diarrhea, nausea and vomiting.   Genitourinary: Negative for difficulty urinating.   Musculoskeletal: Negative for back pain and myalgias.   Skin: Negative for rash.   Neurological: Negative for dizziness and weakness.   Psychiatric/Behavioral: Negative.  The patient is not nervous/anxious.        Objective:      Vitals:    02/16/17 1143   BP: (!) 147/80   BP Location: Right arm   Patient Position: Sitting   BP Method: Automatic   Pulse: 80   Temp: 98 °F (36.7 °C)   TempSrc: Oral   Weight: 91.5 kg (201 lb 11.5 oz)   Height: 5' 7" (1.702 m)     Body mass index is 31.59 kg/(m^2).  Physical Exam   Constitutional: She is oriented to person, " place, and time. She appears well-developed and well-nourished.   HENT:   Head: Normocephalic and atraumatic.   Eyes: Conjunctivae and EOM are normal. Pupils are equal, round, and reactive to light.   Neck: Normal range of motion. Neck supple. No JVD present.   Cardiovascular: Normal rate, regular rhythm, normal heart sounds and intact distal pulses.  Exam reveals no gallop and no friction rub.    No murmur heard.  Pulmonary/Chest: Effort normal. No respiratory distress. She has no wheezes.   Abdominal: Soft. Bowel sounds are normal. There is no tenderness. There is no rebound and no guarding.   Musculoskeletal: Normal range of motion.   Neurological: She is alert and oriented to person, place, and time. No cranial nerve deficit.   Skin: Skin is warm and dry.   Psychiatric: She has a normal mood and affect. Her behavior is normal. Judgment and thought content normal.   Nursing note and vitals reviewed.      Assessment:       1. Vitamin D deficiency    2. MANJINDER (generalized anxiety disorder)    3. Hypercholesteremia    4. Type 2 diabetes mellitus with complication, with long-term current use of insulin    5. Hypertension associated with diabetes    6. Type 2 diabetes mellitus with complication, without long-term current use of insulin    7. Migraine without status migrainosus, not intractable, unspecified migraine type        Plan:       Vitamin D deficiency  -     ergocalciferol (ERGOCALCIFEROL) 50,000 unit Cap; Take 1 capsule (50,000 Units total) by mouth every 7 days.  Dispense: 12 capsule; Refill: 0  -     Vitamin D; Future; Expected date: 2/16/17    MANJINDER (generalized anxiety disorder)  -     escitalopram oxalate (LEXAPRO) 10 MG tablet; Take 1 tablet (10 mg total) by mouth every evening.  Dispense: 90 tablet; Refill: 3    Hypercholesteremia  -     atorvastatin (LIPITOR) 80 MG tablet; Take 1 tablet (80 mg total) by mouth once daily.  Dispense: 90 tablet; Refill: 3  -     fenofibrate 160 MG Tab; Take 1 tablet (160 mg  total) by mouth once daily.  Dispense: 90 tablet; Refill: 3    Type 2 diabetes mellitus with complication, with long-term current use of insulin  -     glipiZIDE (GLUCOTROL) 10 MG tablet; Take 1 tablet (10 mg total) by mouth 2 (two) times daily before meals.  Dispense: 180 tablet; Refill: 3  -     metformin (GLUCOPHAGE) 500 MG tablet; Take 2 tablets (1,000 mg total) by mouth 2 (two) times daily with meals.  Dispense: 360 tablet; Refill: 3  -     blood sugar diagnostic Strp; Inject 1 strip into the skin 3 (three) times daily. TrueResult Glucometer Strips.  Testing Daily.  Dispense: 100 each; Refill: 11  -     TRUEPLUS LANCETS 33 gauge Misc; Inject 1 lancet into the skin 3 (three) times daily.  Dispense: 200 each; Refill: 11  -     Hemoglobin A1c; Future; Expected date: 2/16/17    Hypertension associated with diabetes  -     losartan (COZAAR) 100 MG tablet; Take 1 tablet (100 mg total) by mouth once daily.  Dispense: 90 tablet; Refill: 3  -     metoprolol tartrate (LOPRESSOR) 25 MG tablet; Take 1 tablet (25 mg total) by mouth 2 (two) times daily.  Dispense: 60 tablet; Refill: 11  -     nifedipine (PROCARDIA-XL) 60 MG (OSM) 24 hr tablet; Take 1 tablet (60 mg total) by mouth once daily.  Dispense: 90 tablet; Refill: 0  -     hydrochlorothiazide (HYDRODIURIL) 12.5 MG Tab; Take 1 tablet (12.5 mg total) by mouth once daily.  Dispense: 90 tablet; Refill: 3      Migraine without status migrainosus, not intractable, unspecified migraine type  -     codeine-butalbital-ASA-caffeine (BUTALBITAL COMPOUND W/CODEINE) 81--40 mg Cap; Take 1 capsule by mouth every 4 (four) hours as needed.  Dispense: 30 capsule; Refill: 0  -     ondansetron (ZOFRAN-ODT) 4 MG TbDL; Take 1 tablet (4 mg total) by mouth every 6 (six) hours as needed.  Dispense: 30 tablet; Refill: 11    Patient readiness: acceptance and barriers:none    During the course of the visit the patient was educated and counseled about the following:     Diabetes:  Discussed  general issues about diabetes pathophysiology and management.  Educational material distributed.  Addressed ADA diet.  Suggested low cholesterol diet.  Encouraged aerobic exercise.  Discussed foot care.  Reminded to get yearly retinal exam.  Discussed ways to avoid symptomatic hypoglycemia.  Hypertension:   Dietary sodium restriction.  Regular aerobic exercise.  Check blood pressures daily and record.  Obesity:   General weight loss/lifestyle modification strategies discussed (elicit support from others; identify saboteurs; non-food rewards, etc).  Informal exercise measures discussed, e.g. taking stairs instead of elevator.  Regular aerobic exercise program discussed.    Goals: Diabetes: Maintain Hemoglobin A1C below 7, Hypertension: Reduce Blood Pressure and Obesity: Reduce calorie intake and BMI    Did patient meet goals/outcomes: Yes    The following self management tools provided: blood pressure log  blood glucose log  excercise log    Patient Instructions (the written plan) was given to the patient/family.     Time spent with patient: 45 minutes    Return in about 3 months (around 5/16/2017).  Nolberto Lomax MD  [facility]  2/16/2017 1:26 PM

## 2017-02-16 NOTE — MR AVS SNAPSHOT
[facility]  [address]  [place] [address]  Phone: [phone]  Fax: [phone]                  Leanna García   2017 11:20 AM   Office Visit    Description:  Female : 1965   Provider:  Nolberto Lomax MD   Department:  [facility]           Reason for Visit     Hypertension     Diabetes           Diagnoses this Visit        Comments    Vitamin D deficiency    -  Primary     MANJINDER (generalized anxiety disorder)         Hypercholesteremia         Type 2 diabetes mellitus with complication, with long-term current use of insulin         Hypertension associated with diabetes         Type 2 diabetes mellitus with complication, without long-term current use of insulin         Migraine without status migrainosus, not intractable, unspecified migraine type                To Do List           Future Appointments        Provider Department Dept Phone    4/10/2017 3:00 PM [phone]    2017 9:45 AM LAB, [place] [facility] - [facility] [phone]    2017 11:20 AM Nolberto Lomax MD [facility] Medicine [phone]      Goals (5 Years of Data)     None      Follow-Up and Disposition     Return in about 3 months (around 2017).       These Medications        Disp Refills Start End    atorvastatin (LIPITOR) 80 MG tablet 90 tablet 3 2017    Take 1 tablet (80 mg total) by mouth once daily. - Oral    Pharmacy: [facility] - [facility] [address] [facility] Ph #: [phone]       escitalopram oxalate (LEXAPRO) 10 MG tablet 90 tablet 3 2017    Take 1 tablet (10 mg total) by mouth every evening. - Oral    Pharmacy: [facility] - [place], [place] - [address] [facility] Ph #: [phone]       fenofibrate 160 MG Tab 90 tablet 3 2017     Take 1 tablet (160 mg total) by mouth once daily. - Oral    Pharmacy: [facility] -  [address] Ph #: [phone]       glipiZIDE (GLUCOTROL) 10 MG tablet 180 tablet 3 2/16/2017 2/16/2018    Take 1 tablet (10 mg total) by mouth 2 (two) times daily before meals. - Oral    Pharmacy: [facility] - [address] Ph #: [phone]       losartan (COZAAR) 100 MG tablet 90 tablet 3 2/16/2017 2/16/2018    Take 1 tablet (100 mg total) by mouth once daily. - Oral    Pharmacy: [facility] - [address] Ph #: [phone]       metoprolol tartrate (LOPRESSOR) 25 MG tablet 60 tablet 11 2/16/2017 2/16/2018    Take 1 tablet (25 mg total) by mouth 2 (two) times daily. - Oral    Pharmacy: [facility] - [address] Ph #: [phone]       nifedipine (PROCARDIA-XL) 60 MG (OSM) 24 hr tablet 90 tablet 0 2/16/2017 2/16/2018    Take 1 tablet (60 mg total) by mouth once daily. - Oral    Pharmacy: [facility] - [address] Ph #: [phone]       ergocalciferol (ERGOCALCIFEROL) 50,000 unit Cap 12 capsule 0 2/16/2017     Take 1 capsule (50,000 Units total) by mouth every 7 days. - Oral    Pharmacy: [facility] - [address] Ph #: [phone]       metformin (GLUCOPHAGE) 500 MG tablet 360 tablet 3 2/16/2017 2/16/2018    Take 2 tablets (1,000 mg total) by mouth 2 (two) times daily with meals. - Oral    Pharmacy: [facility] - [address] Ph #: [phone]       blood sugar diagnostic Strp 100 each 11 2/16/2017     Inject 1 strip into the skin 3 (three) times daily. TrueResult Glucometer Strips.  Testing Daily. - Subcutaneous    Pharmacy: [facility] - [address] Ph #: [phone]       TRUEPLUS LANCETS 33 gauge Misc 200 each 11 2/16/2017     Inject 1 lancet into the skin 3 (three) times daily. - Subcutaneous     Pharmacy: [facility] - [facility] [address] [facility] Ph #: [phone]       hydrochlorothiazide (HYDRODIURIL) 12.5 MG Tab 90 tablet 3 2/16/2017     Take 1 tablet (12.5 mg total) by mouth once daily. - Oral    Pharmacy: [facility] - [facility] [address] [place] [address] Ph #: [phone]       codeine-butalbital-ASA-caffeine (BUTALBITAL COMPOUND W/CODEINE) 60--40 mg Cap 30 capsule 0 2/16/2017 2/26/2017    Take 1 capsule by mouth every 4 (four) hours as needed. - Oral    Pharmacy: [facility] - [address] #: [phone]       ondansetron (ZOFRAN-ODT) 4 MG TbDL 30 tablet 11 2/16/2017     Take 1 tablet (4 mg total) by mouth every 6 (six) hours as needed. - Oral    Pharmacy: [facility] - [place] [id number] [facility] #: [phone]         [facility]     [facility] Nurse Care Line - 24/7 Assistance  Registered nurses in the [facility] provide clinical advisement, health education, appointment booking, and other advisory services.  Call for this free service at [phone].             Medications           Message regarding Medications     Verify the changes and/or additions to your medication regime listed below are the same as discussed with your clinician today.  If any of these changes or additions are incorrect, please notify your healthcare provider.        START taking these NEW medications        Refills    ergocalciferol (ERGOCALCIFEROL) 50,000 unit Cap 0    Sig: Take 1 capsule (50,000 Units total) by mouth every 7 days.    Class: Normal    Route: Oral    metformin (GLUCOPHAGE) 500 MG tablet 3    Sig: Take 2 tablets (1,000 mg total) by mouth 2 (two) times daily with meals.    Class: Normal    Route: Oral    ondansetron (ZOFRAN-ODT) 4 MG TbDL 11    Sig: Take 1 tablet (4 mg total) by mouth every 6 (six) hours as needed.    Class: Normal    Route: Oral       CHANGE how you are taking these medications     Start Taking Instead of    blood sugar diagnostic Strp blood sugar diagnostic Strp    Dosage:  Inject 1 strip into the skin 3 (three) times daily. TrueResult Glucometer Strips.  Testing Daily. Dosage:  TrueResult Glucometer Strips.  Testing Daily.    Reason for Change:  Reorder     TRUEPLUS LANCETS 33 gauge Misc TRUEPLUS LANCETS 33 gauge Misc    Dosage:  Inject 1 lancet into the skin 3 (three) times daily.     Reason for Change:  Reorder     hydrochlorothiazide (HYDRODIURIL) 12.5 MG Tab hydrochlorothiazide (HYDRODIURIL) 12.5 MG Tab    Dosage:  Take 1 tablet (12.5 mg total) by mouth once daily. Dosage:  Take 12.5 mg by mouth once daily.    Reason for Change:  Reorder       STOP taking these medications     metformin (GLUCOPHAGE-XR) 750 MG 24 hr tablet Take 1 tablet (750 mg total) by mouth 2 (two) times daily with meals.           Verify that the below list of medications is an accurate representation of the medications you are currently taking.  If none reported, the list may be blank. If incorrect, please contact your healthcare provider. Carry this list with you in case of emergency.           Current Medications     albuterol (PROVENTIL) 2.5 mg /3 mL (0.083 %) nebulizer solution Take 2.5 mg by nebulization 2 (two) times daily as needed for Wheezing.    albuterol 90 mcg/actuation inhaler Inhale 2 puffs into the lungs 4 (four) times daily.    aspirin (ECOTRIN) 81 MG EC tablet Take 81 mg by mouth once daily.    atorvastatin (LIPITOR) 80 MG tablet Take 1 tablet (80 mg total) by mouth once daily.    blood sugar diagnostic Strp Inject 1 strip into the skin 3 (three) times daily. TrueResult Glucometer Strips.  Testing Daily.    butalbital-acetaminop-caf-cod -71-30 mg Cap     codeine-butalbital-ASA-caffeine (BUTALBITAL COMPOUND W/CODEINE) 84--40 mg Cap Take 1 capsule by mouth every 4 (four) hours as needed.    escitalopram oxalate (LEXAPRO) 10 MG tablet Take 1  "tablet (10 mg total) by mouth every evening.    fenofibrate 160 MG Tab Take 1 tablet (160 mg total) by mouth once daily.    fish oil-omega-3 fatty acids 300-1,000 mg capsule Take 2 g by mouth once daily. Stop taking until after surgery.    furosemide (LASIX) 40 MG tablet Take 1 tablet (40 mg total) by mouth once daily. 1/2 tablet daily    glipiZIDE (GLUCOTROL) 10 MG tablet Take 1 tablet (10 mg total) by mouth 2 (two) times daily before meals.    hydrochlorothiazide (HYDRODIURIL) 12.5 MG Tab Take 1 tablet (12.5 mg total) by mouth once daily.    lancets Misc Use to test blood sugar daily    True Results  DX:E11.9    losartan (COZAAR) 100 MG tablet Take 1 tablet (100 mg total) by mouth once daily.    metoprolol tartrate (LOPRESSOR) 25 MG tablet Take 1 tablet (25 mg total) by mouth 2 (two) times daily.    nifedipine (PROCARDIA-XL) 60 MG (OSM) 24 hr tablet Take 1 tablet (60 mg total) by mouth once daily.    polyethylene glycol (GLYCOLAX) 17 gram/dose powder Take 17 g by mouth once daily. 2 x daily for 2 weeks then once daily for 4 weeks, hold if loose bm and decrease to every other day    TRUEPLUS LANCETS 33 gauge Misc Inject 1 lancet into the skin 3 (three) times daily.    ergocalciferol (ERGOCALCIFEROL) 50,000 unit Cap Take 1 capsule (50,000 Units total) by mouth every 7 days.    metformin (GLUCOPHAGE) 500 MG tablet Take 2 tablets (1,000 mg total) by mouth 2 (two) times daily with meals.    ondansetron (ZOFRAN-ODT) 4 MG TbDL Take 1 tablet (4 mg total) by mouth every 6 (six) hours as needed.           Clinical Reference Information           Your Vitals Were     BP Pulse Temp Height Weight BMI    147/80 (BP Location: Right arm, Patient Position: Sitting, BP Method: Automatic) 80 98 °F (36.7 °C) (Oral) 5' 7" (1.702 m) 91.5 kg (201 lb 11.5 oz) 31.59 kg/m2      Blood Pressure          Most Recent Value    BP  (!)  147/80      Allergies as of 2/16/2017     Dilaudid [Hydromorphone (Bulk)]    Lortab " [Hydrocodone-acetaminophen]      Immunizations Administered on Date of Encounter - 2/16/2017     None      Orders Placed During Today's Visit     Future Labs/Procedures Expected by Expires    Hemoglobin A1c  2/16/2017 4/17/2018    Vitamin D  2/16/2017 4/17/2018      Language Assistance Services     ATTENTION: Language assistance services are available, free of charge. Please call [phone].      ATENCIÓN: Si habla español, tiene a pretty disposición servicios gratuitos de asistencia lingüística. Llame al [phone].     CHÚ Ý: N?u b?n nói Ti?ng Vi?t, có các d?ch v? h? tr? ngôn ng? mi?n phí dành cho b?n. G?i s? [phone].         [facility] complies with applicable Federal civil rights laws and does not discriminate on the basis of race, color, national origin, age, disability, or sex.

## 2017-02-20 ENCOUNTER — OUTPATIENT CASE MANAGEMENT (OUTPATIENT)
Dept: ADMINISTRATIVE | Facility: OTHER | Age: 52
End: 2017-02-20

## 2017-02-20 NOTE — PROGRESS NOTES
Name Leanna RIZVI 1965 Medical Record  # [id number]   Visit Location __ Home __ Clinic _x_ Telephonic __Other  Encounter Date:  17   Contact Type __ New Case   _x_ Follow Up  __ Monitoring  __ Case Closure Next Follow-up Date Week of   17                         Summary:  17-Called and patient asked to be called back in one hour.  Called patient back and went over diabetic educational material with her and how to manage her stress with diabetes. Patient to go camping this weekend to assist with her stress. Will mail to patient-managing stress with your diabetes. Will follow up with patient in three weeks.     27-Called and spoke to patient. Discussed her blood pressure and diabetes. Patient will have her  A1C drawn on 17.Will mail to patient- Your diabetes tool kit, planning for travel when you have diabetes, and eating a high fiber diet. Will follow up with patient in three weeks.     17-Called and spoke to patient. She was lying down because of a sinus headache. Will follow up with patient next week.     17- Called and spoke to patient about her blood pressure, new medications, s/p hernia surgery, constipation, diabetes, pneumonia and her upcoming doctor appts and labs. Patient has an appt with Dr. Gautam, nephrologist on 2017. Patient to have A1C drawn on 17. Sending patient [facility]  financial assistance paperwork. Will follow up with patient in three weeks.     16-Called and spoke to patient. Went over follow up appts with patient after hospital discharge. Will follow up with patient in 4 weeks.     16-Called and spoke to patient. Patient had a sigmoidoscopy yesterday and a polyp removed. Patient has an appt with Dr. Serrano on 11/15/16 to schedule surgery for her hernia repair.Encouraged her to schedule her surgery as soon as possible if she is having any pain.  Patient is taking all her medications. Blood pressure is 127/80. Patient's A1C  was 8.0 on 10/22/16. Will mail to patient diabetic holiday party recipes    10/27/16-Called and patient is unable to talk at this time. Asked to be called back next week.     10/31/16- Called and spoke to patient. Went over education with her on getting her labs, diabetic and blood pressure education. Will mail to patient diabetic education on holiday party recipes. Will follow up with patient after her appt on 10/25/16.    09/23/16-Called and patient has seen several doctors for her abdominal pain and her two inguinal hernias. Patient is having a colonoscopy on 10/05/16. Then patient will talk to surgeon about having surgery if needed. Patient went over her instructions with me for the prep for her colonoscopy, answered all her questions. Patient is taking four blood pressure medications. Talked to patient about handling stress during this time. Will mail patient diabetic education material and blood pressure education material.     09/09/16-Called and spoke to the patient. Patient has been to urgent care, an appt with Dr. Dial and to the ER. Patient has a cough from bronchitis and her blood pressure was high when she went to the ER. Patient is now on four blood pressure medications. Patient asking if she should be on all these blood pressure medications. Patient states that her labs did not show she had a UTI but she was given an antibiotic. Patient is drinking water and cranberry juice.  Patient is taking all her medications. Patient does have a new blood pressure cuff. Her blood pressure this am was 136/79. Patient is aware if her blood pressure gets too high she could have a stroke. Patient is having some stress in her life. Patient's blood glucose this am was 125. Will cont to follow patient for needs.     08/26/16-Called and spoke to patient about her blood pressure, bronchitis and diabetes. Patient is not having any signs or symptoms of bronchitis. Went over educational material with patient on blood  pressure and diabetes. Patient's  has gone back to work after being laid off from another job but they are not having any financial difficulties. Patient blood glucose this am was 123. Patient wants to start exercising again but her back is bothering her. Patient is going to get an eye appt when she gets the money for an appt. Patient is trying to follow a low sodium/diabetic diet. Patient's next A1c is in October and she can not wait to see if her A1C is lower.  Will mail patient information on diabetic and blood pressure education.     08/11/16- Patient received all the diabetic information sent to her. Went over the material and answered questions. Patient had an appt with her PCP on 08/08/16. Patient was given a celestone injection, rocephin injection and a RX for cough syrup. Patient states that she is much better. Patient's blood glucose did run high after the celestone injection but now is running anywhere from 70 to 120. Patient is taking all of her medication and patient's blood pressure remains high so the doctor changed her medication. Patient is eating healthy. Discussed A1C with patient. Her next lab to check her A1C is in October. Patient wants to lose weight and start exercising again.  Will send patient diabetic education and blood pressure education material to patient.     08/04/16-Called and left message for patient to call back.     07/22/16-Called and spoke to patient. Patient's  is out of work at this time. Went over information mailed to patient about diabetes and high blood pressure. Patient needs a new battery for her blood pressure machine. Patient's blood glucose this am was 113. Patient feels like she does not need a dietician at this time since she is taking glipzide. Patient feels like her diabetes is under control with this new medication. Went over diabetes distress scale questions with patient. Will mail patient diabetic education material and blood pressure education.      07/18/16-Called and patient is in [facility]. Will follow up with  patient another day.     07/05/16-Called and spoke to Leanna García, 50 year old female. Patient is independent with her ADL's and does not have home health. Patient does not drive. Patient checks her blood pressure and blood glucose one time a day and keeps logs. Patient was changed from levemir to glipizide because she could not afford the insulin. Patient has no problems with paying for her medications at this time. Went over signs and symptoms of a stroke with patient. Patient eats three meals a day with snacks. Patient does have back pain at times because of her DDD and scoliosis per patient. Patient and her  went camping this weekend. Will send patient resource material for diabetes, hypertension, OPCM brochure and my card. Case Management will cont to follow for needs.       Plan:  Problem 3-Discharge hospital instructions -- MET   Problem 1- Diabetes education  Problem 2- Blood Pressure education       Problem  1    Discharge Instructions - Recent Hospitalization   Short Term Goal Patient/Caregiver will:   Be able to verbalize _amoxicillin and colace/metamucil _______ medication dosage/route frequency within _now_.  (__Partially met _x__Met __Not met ___ Deferred)     Will verbalize _2_  interventions of preventing complications due to disease process in now  (__Partially met _x__Met __Not met ___ Deferred)         Long Term Goals Ensure patients who transition from one site of care to the next have the tools and knowledge to be successful with on-going self management.      Goal Met _x_Yes  __No   Reason:      Interventions Date Addressed    x__Compare pre-hospital medications with medications on hospital discharge list 12/05/16   _x_ Identify medications that were prescribed but not obtained   12/05/16   __ Identify medication discrepancies and develop a plan to resolve discrepancies      _x_ Answer questions about medications    "12/05/16   __ Alert patient to potential adverse drug reaction(s)      __ Encourage  use of patient's medication management "system"       __ Identify medications needing refills and/or barriers to refill    _x_ Encourage patient to set up follow-up appointment   12/05/16   _x_ Discuss questions with patient for PCP or specialist visit   12/05/16   __ Clarify whether patient will need to obtain follow up tests and/or results      __ Provide teaching for how to obtain follow-up tests and results      _x_ Review discharge instructions   12/05/16   _x_ Discuss patients conditions and self management of condition(s)    12/05/16   _x_ Discuss target symptoms / side effects to monitor and what to do should they arise   12/05/16   _x_ Discuss when PCP should be called   12/05/16   _x_Discuss pain management.    12/05/16   _x_ Discuss constipation    12/05/16   __ Discuss patient's personal goal and possible steps for achieving      __ Ensure Durable Medical Equipment is delivered      __ Ensure [facility] is involved if appropriate      _x_ Determine adequacy of support system and need for ongoing case management   12/05/16   __ Connect patient to necessary community resources      __    __    __        Problem   1 Diabetes   Short Term Goal Patient/Caregiver will:   Check and record blood glucose  _1_  times per day for _2_ weeks  (_x_Partially met _x__Met __Not met ___ Deferred)   Verbalize _2_ signs and symptoms of hypo/hyper glycemia _2 _ weeks  (_x_Partially met _x__Met __Not met ___ Deferred)     Verbalize current A1C and understanding of recommended value in _2_ weeks  (_x_Partially met _x__Met __Not met ___ Deferred)      Diet and Meal Planning  Patient/Caregiver will:   Replace _carbohydrates _____________ with _fruits and vegetables ___________ for _2  meals for _2_  Weeks.   (_x_Partially met _x__Met __Not met ___ Deferred)     Choose ____diabetic ______ grocery list items to prepare diet meals in next _2_ " "weeks  (_x_Partially met _x__Met __Not met ___ Deferred)      Exercise and Weight  Patient/Caregiver will:   Exercise _walk __ for _10 _ minutes a day _3_ times a week  o Example: Yoga, Walk, Jog, etc.  (_x_Partially met _x__Met __Not met ___ Deferred     Weigh Daily/Weekly and record weight for _6_ weeks  (_x_Partially met ___Met __Not met ___ Deferred)     Gain/Lose _2_ pounds  by Exercising/Walking in _4_ weeks  (_x_Partially met _x__Met __Not met ___ Deferred)    Medications  Patient/Caregiver will:   Be able to verbalize _glucotrol and glucophage_______ medication dosage/route frequency within _2_ weeks  (_x_Partially met _x__Met __Not met ___ Deferred)      Self-Care  Patient/Caregiver will:   Will follow-up with PCP to coordinate referrals to _LABS __________ (Ex: Ophthalmology, Podiatry, Labs, Dietician) within _2 weeks  (_x_Partially met ___Met __Not met ___ Deferred)     Will verbalize _2_  interventions of preventing complications due to disease process in _4_ weeks  (_x_Partially met _x__Met __Not met ___ Deferred)    Complications and Co-Morbidities  Patient/Caregiver will:   Can verbalize _2_ long-term complications of diabetes in _4_ weeks  (_x_Partially met _x__Met __Not met ___ Deferred)   Can verbalize _2_ interventions to keep Feet/Eyes healthy in _4_  weeks  (_xPartially met _x__Met __Not met __ Deferred)     Long Term Goals Patient will get A1C tested every 6 months and is results ? 8 will make appoint with PCP within 2 weeks   Goals Met Partially met  x Not met Deferred    Interventions Date Addressed    __ Review with and provide education materials to pt. Types of Diabetes: "Pre Diabetes" ([facility])    __ Review with and provide education materials to pt. Types of Diabetes: "What is Type 1 Diabetes?" ([facility])    _x_ Review with and provide education materials to pt. Types of Diabetes: "What is Type 2 Diabetes?" ([facility]) 08/11/16 09/23/16   _x_ Review with and provide education " "materials to pt. Resources for People with Diabetes ([facility]) 07/22/16 09/23/16     Glucose Monitoring    _x_ Discuss glucometer monitoring  07/05/16  07/22/16  08/11/16  07/26/16  09/09/16  09/23/16  10/10/16  11/03/16  12/05/16  01/05/17  01/31/17  02/20/17   _x_ Educate regarding physicians recommended BS range 07/05/16  07/22/16  08/11/16  09/09/16  09/23/16  10/10/16  01/05/17  01/31/17  02/20/17   _x_ Review with and provide education materials to pt. Review with and provide education materials to pt. Blood Sugar Management: Hypoglycemia (Low Blood Sugar) ([facility]) 07/05/16 07/22/16 08/11/16 08/26/16 09/23/16 12/05/16 01/05/17 01/31/17   _x_ Review with and provide education materials to pt. Review with and provide education materials to pt. Blood Sugar Management: Hyperglycemia (High Blood Sugar) ([facility]) 07/05/16 07/22/16 01/05/17 01/31/17   _x_ Review with and provide education materials to pt. Blood Sugar Management: Using a Blood Sugar Log  ([facility]) 07/05/16 01/31/17   _x_  Review with and provide education materials to pt. Blood Sugar Management: Managing Diabetes: The A1C Test ([facility]) 07/05/16 07/22/16 08/11/16 08/26/16 11/03/16 01/05/17 01/31/17 02/20/17   Diet and Meal Planning     _x_ Review with and provide education materials to pt. Diet and Meal Planning - "Healthy Meals for Diabetes" ([facility]) 07/05/16  07/22/16  08/11/16  08/26/16  09/23/16  10/10/16  11/03/16  12/05/16  01/05/17  01/31/17  02/20/17   _x Review with and provide education materials to pt. Diet and Meal Planning - "Diabetes: Understanding Carbohydrates"  and Understanding Carbohydrates, Fats and Protein, ([facility]) 07/05/16      _x_ Review with and provide education materials to pt. Diet and Meal Planning - "Eating Out When You Have Diabetes" ([facility]) 08/11/16 08/26/16 01/05/17   _x_ Review with and provide education materials to pt. Diet and Meal Planning - "Learning About Serving and Portion Sizes"  ([facility]) " "07/05/16 08/11/16     _x_ Review with and provide education materials to pt. Diet and Meal Planning - "Diabetes: Shopping for and Preparing Meals"  ([facility]) 07/22/16 08/26/16 11/03/16 01/31/17 02/20/17     _x_ Review with and provide education materials to pt. Diet and Meal Planning - "Diabetes: Learning About Serving and Portion Sizes"  ([facility]) 12/05/16 01/05/17 02/20/17   _x_ Review with and provide education materials to pt. Diet and Meal Planning - Grocery List   11/03/16   _x_Review with and provide education materials to pt. Review with and provide education materials to pt. Diet and Meal Planning - "Eating a High Fiber Diet"  ([facility]) 08/11/16 12/05/16 01/31/17   _x_ Review with and provide education materials to pt. Diet and Meal Planning - "Diabetes: Meal Planning"  ([facility]) 08/11/16 02/20/17   __ Review with and provide education materials to pt. Complications & Co morbidities - "Diabetes and Alcohol Consumption" ([facility])    Exercise and Weight    _x_ Review with and provide education materials to pt. Exercise: "Exercise to Manage Your Blood Sugar" ([facility]) 07/22/16 08/11/16 08/26/16 01/31/17 02/20/17   _x_ Review with and provide education materials to pt. Exercise: "Diabetes: The Benefits of Exercise" ([facility]) 08/26/16 01/31/17   __ Review with and provide education materials to pt. Exercise: "Before You Start With Diabetes Exercise Plan" ([facility])    _x_ eview with and provide education materials to pt. Exercise: "Diabetes: Getting Started with Exercise" ([facility]) 08/11/16   _x_ Review with and provide education materials to pt. Exercise: "Diabetes: Activity Tips" ([facility]) 08/26/16   _x_ Review with and provide education materials to pt. Exercise: "Diabetes: Tracking Your Fitness Progress" ([facility]) 08/26/16   __ Review with and provide education materials to pt. Exercise: "Type 1 Diabetes: Getting Active"  ([facility])     _x_ Review with and provide education materials to pt. Weight " "Management: "Finding Your Ideal Weight" ([employer]) 08/26/16   Medications    __ Review with and provide education materials to pt. Medications: "Using Injected Insulin" ([facility])    _x_ Review with and provide education materials to pt. Medications: "Oral Therapy for Type 2 Diabetes" ([facility]) 07/05/16 08/11/16 08/26/16 09/09/16 11/03/16 02/20/17       __ Review with and provide education materials to pt. Medications: "Types of Insulin" ([facility])    __ Review with and provide education materials to pt. Medications: "Diabetes: Ways to Take Medication"  ([facility])    _x_ Review with and provide education materials to pt. Medications: "Taking Medication for Diabetes"  ([employer]) 07/05/16 07/22/16 08/11/16 08/26/16 09/09/16 02/20/17   Complications and Co morbidities    __ Review with and provide education materials to pt. Complications & Co morbidities - "After Leg Amputation: Keeping Your Other Leg Healthy" ([facility])    __ Review with and provide education materials to pt. Complications & Co morbidities - "Diabetic Retinopathy: Controlling Your Risk Factors" ([facility])    _x_ Review with and provide education materials to pt. Complications & Co morbidities - "Long-term Complications of Diabetes" ([facility]) 08/11/16 08/26/16 09/09/16   __ Review with and provide education materials to pt. Complications & Co morbidities - "Diabetic Retinopathy: Evaluating Your Eyes"([facility])    _x_ Review with and provide education materials to pt. Types of Diabetes: "Diabetic Retinopathy: Controlling Your Risk Factors" ([facility]) 08/26/16    Self  Care    _x_  Review with and provide education materials to pt. Self Care - "Diabetes: Keeping Your Feet Healthy" ([facility]) 07/22/16     _x_ Review with and provide education materials to pt. Self Care - "Your Diabetes Foot Care Program"  ([employer]) 08/26/16 09/09/16   _x_ Review with and provide education materials to pt. Self Care - "Diabetes: Caring For Your Body" ([facility]) 10/10/16   _x_ Review " "with and provide education materials to pt. Self Care - "Diabetes: Sick-Day Plan" ([facility]) 08/11/16 09/09/16 12/05/16 01/05/17   __ Review with and provide education materials to pt. Self Care - "Diabetes: Driving Issues" ([facility])    __ Review with and provide education materials to pt. Self Care - "Diabetes: Living Your Life" ([facility])    __ Review with and provide education materials to pt. Self Care - "Getting Support When You Have Diabetes" ([facility])    _x_ Review with and provide education materials to pt. Self Care - "Managing Stress When You Have Diabetes" ([facility]) 09/23/16 11/03/16 01/05/17 02/20/17   _x_ Review with and provide education materials to pt. Self Care - "Planning for Travel When You Have Diabetes" ([facility]) 08/11/16 02/20/17   _x_ Review with and provide education materials to pt. Types of Diabetes: "Your Diabetes Toolkit" ([facility]) 01/31/17         Referrals/Management    __ Facilitate referral to diabetic class    __ Facilitate obtaining DM supplies    __ Facilitate referral to podiatrist    __ Monitor DM management (HgA1c, dental exam 2xs/yr, annual flu shot, annual eye exam, annual comprehensive foot exam)    _x_ Encourage regular medical appointments 07/05/16  08/11/16  09/09/16  09/23/16  10/10/16  12/05/16  01/05/17  01/31/17  02/20/17                             Problem   2  Blood Pressure Control   Short Term Goal   Patient/Caregiver will:   Will measure and record blood pressure daily for 2 weeks..  (_x_Partially met _x__Met __Not met ___ Deferred)   Will verbalize_2_   ways to reduce stress in _12_ weeks.  (_x_Partially met __x_Met __Not met ___ Deferred)   Will verbalize _2_  risk factors within _6_ weeks.  (_x_Partially met _x__Met __Not met ___ Deferred)  Patient/Caregiver will:   Be able to verbalize norvasc________ medication dosage/route frequency within _2_ weeks  (__Partially met _x__Met __Not met ___ Deferred)      Complications and Co-Morbidities  Patient/Caregiver " "will:    verbalize _2_ long-term complications of hypertension in _4_ weeks  (_x_Partially met _x__Met __Not met ___ Deferred)   Can verbalize _2_ interventions to keep blood pressure under control  in _8  weeks  (_x_Partially met _x__Met __Not met ___ Deferred)     Long Term Goals Patient/Caregiver will check and record blood pressure daily. If > 140/90 for 3 days, will notify Physician.   Goal Met Met  x Partially met   Not Met Deferred    Interventions Date Addressed    _x_ Review and provide education materials with pt. "Controlling High Blood Pressure" ([facility]   07/05/16  08/11/16  08/26/16  09/23/16  10/10/16  12/05/16  01/05/17  01/31/17     _x_ Review and provide education materials with pt. "Your High Blood Pressure Risk Factors" ([facility])   08/11/16  10/10/16  12/05/16  01/05/17  01/31/17   _x_ Review and provide education materials with pt. "Taking Your Blood Pressure" ([facility])   07/05/16 07/22/16 09/09/16 12/05/16 01/05/17 01/31/17     _x_ Review and provide education materials with pt. "What is High Blood Pressure" ([facility])   07/05/16  09/09/16  10/10/16  12/05/16  01/05/17  01/31/17   __ Review and provide education materials with pt. "High Blood Pressure and Peripheral Arterial Disease" ([facility])      _x_ Encourage medication compliance   07/05/16  09/09/16  08/11/16  08/26/16  09/23/16  10/10/16  12/05/16  01/05/17  01/31/17   _x_ Discuss s/s of hypotension (weakness, dizziness, mental changes, bradycardia, nausea, vomiting)   07/22/16      _x_ Discuss s/s of hypertension and to call MD immediately if /110 or high BP causing headaches or other symptoms.   07/05/16  07/22/16  10/10/16  12/05/16  01/05/17  01/31/17     _x_ Educate Pt/family to predisposing controllable factors   07/05/16  08/11/16  10/10/16  01/05/17     __    __    ___          "

## 2017-03-01 ENCOUNTER — TELEPHONE (OUTPATIENT)
Dept: FAMILY MEDICINE | Facility: CLINIC | Age: 52
End: 2017-03-01

## 2017-03-01 ENCOUNTER — OUTPATIENT CASE MANAGEMENT (OUTPATIENT)
Dept: ADMINISTRATIVE | Facility: OTHER | Age: 52
End: 2017-03-01

## 2017-03-01 DIAGNOSIS — I10 ESSENTIAL HYPERTENSION: Primary | ICD-10-CM

## 2017-03-01 RX ORDER — FUROSEMIDE 40 MG/1
40 TABLET ORAL DAILY
Qty: 45 TABLET | Refills: 0 | Status: CANCELLED | OUTPATIENT
Start: 2017-03-01

## 2017-03-01 NOTE — TELEPHONE ENCOUNTER
----- Message from Shirlene Brenner sent at 3/1/2017 11:03 AM CST -----  Contact: Patient  Leanna, patient [phone], Patient is calling about medication issues. Blood Pressure medication is very expense. Insurance([employer]) will pay for Amlodipine,  can the Rx be changed. Patient, also did not get fluid pills. And headache medication can not be sent until Doctors office calls [phone] Doctors Team number.  Please advise on all. Thanks.

## 2017-03-01 NOTE — PROGRESS NOTES
Name Leanna RIZVI 1965 Medical Record  # [id number]   Visit Location __ Home __ Clinic _x_ Telephonic __Other  Encounter Date:  17   Contact Type __ New Case   __ Follow Up  __ Monitoring  _x_ Case Closure Next Follow-up Date CASE CLOSURE                          Summary:  17-Called and talked to patient. Answered all her questions will close case at this time. Will dis enroll patient at this time.     17-Called patient and she asked me to call her back later. Called back later and left a message.     17-Called and patient asked to be called back in one hour.  Called patient back and went over diabetic educational material with her and how to manage her stress with diabetes. Patient to go camping this weekend to assist with her stress. Will mail to patient-managing stress with your diabetes. Will follow up with patient in three weeks.     27-Called and spoke to patient. Discussed her blood pressure and diabetes. Patient will have her  A1C drawn on 17.Will mail to patient- Your diabetes tool kit, planning for travel when you have diabetes, and eating a high fiber diet. Will follow up with patient in three weeks.     17-Called and spoke to patient. She was lying down because of a sinus headache. Will follow up with patient next week.     17- Called and spoke to patient about her blood pressure, new medications, s/p hernia surgery, constipation, diabetes, pneumonia and her upcoming doctor appts and labs. Patient has an appt with Dr. Gautam, nephrologist on 2017. Patient to have A1C drawn on 17. [facility]. Will follow up with patient in three weeks.     16-Called and spoke to patient. Went over follow up appts with patient after hospital discharge. Will follow up with patient in 4 weeks.     16-Called and spoke to patient. Patient had a sigmoidoscopy yesterday and a polyp removed. Patient has  an appt with Dr. Serrano on 11/15/16 to schedule surgery for her hernia repair.Encouraged her to schedule her surgery as soon as possible if she is having any pain.  Patient is taking all her medications. Blood pressure is 127/80. Patient's A1C was 8.0 on 10/22/16. Will mail to patient diabetic holiday party recipes    10/27/16-Called and patient is unable to talk at this time. Asked to be called back next week.     10/31/16- Called and spoke to patient. Went over education with her on getting her labs, diabetic and blood pressure education. Will mail to patient diabetic education on holiday party recipes. Will follow up with patient after her appt on 10/25/16.    09/23/16-Called and patient has seen several doctors for her abdominal pain and her two inguinal hernias. Patient is having a colonoscopy on 10/05/16. Then patient will talk to surgeon about having surgery if needed. Patient went over her instructions with me for the prep for her colonoscopy, answered all her questions. Patient is taking four blood pressure medications. Talked to patient about handling stress during this time. Will mail patient diabetic education material and blood pressure education material.     09/09/16-Called and spoke to the patient. Patient has been to urgent care, an appt with Dr. Dial and to the ER. Patient has a cough from bronchitis and her blood pressure was high when she went to the ER. Patient is now on four blood pressure medications. Patient asking if she should be on all these blood pressure medications. Patient states that her labs did not show she had a UTI but she was given an antibiotic. Patient is drinking water and cranberry juice.  Patient is taking all her medications. Patient does have a new blood pressure cuff. Her blood pressure this am was 136/79. Patient is aware if her blood pressure gets too high she could have a stroke. Patient is having some stress in her life. Patient's blood glucose this am was 125. Will  cont to follow patient for needs.     08/26/16-Called and spoke to patient about her blood pressure, bronchitis and diabetes. Patient is not having any signs or symptoms of bronchitis. Went over educational material with patient on blood pressure and diabetes. Patient's  has gone back to work after being laid off from another job but they are not having any financial difficulties. Patient blood glucose this am was 123. Patient wants to start exercising again but her back is bothering her. Patient is going to get an eye appt when she gets the money for an appt. Patient is trying to follow a low sodium/diabetic diet. Patient's next A1c is in October and she can not wait to see if her A1C is lower.  Will mail patient information on diabetic and blood pressure education.     08/11/16- Patient received all the diabetic information sent to her. Went over the material and answered questions. Patient had an appt with her PCP on 08/08/16. Patient was given a celestone injection, rocephin injection and a RX for cough syrup. Patient states that she is much better. Patient's blood glucose did run high after the celestone injection but now is running anywhere from 70 to 120. Patient is taking all of her medication and patient's blood pressure remains high so the doctor changed her medication. Patient is eating healthy. Discussed A1C with patient. Her next lab to check her A1C is in October. Patient wants to lose weight and start exercising again.  Will send patient diabetic education and blood pressure education material to patient.     08/04/16-Called and left message for patient to call back.     07/22/16-Called and spoke to patient. Patient's  is out of work at this time. Went over information mailed to patient about diabetes and high blood pressure. Patient needs a new battery for her blood pressure machine. Patient's blood glucose this am was 113. Patient feels like she does not need a dietician at this time  since she is taking glipzide. Patient feels like her diabetes is under control with this new medication. Went over diabetes distress scale questions with patient. Will mail patient diabetic education material and blood pressure education.     16-Called and patient is in [employer]. Will follow up with  patient another day.     16-Called and spoke to Leanna García, 50 year old female. Patient is independent with her ADL's and does not have home health. Patient does not drive. Patient checks her blood pressure and blood glucose one time a day and keeps logs. Patient was changed from levemir to glipizide because she could not afford the insulin. Patient has no problems with paying for her medications at this time. Went over signs and symptoms of a stroke with patient. Patient eats three meals a day with snacks. Patient does have back pain at times because of her DDD and scoliosis per patient. Patient and her  went camping this weekend. Will send patient resource material for diabetes, hypertension, OPCM brochure and my card. Case Management will cont to follow for needs.   Case Closure Encounter  Name Leanna García    1965   Medical Record  # [id number]     Visit Location __ Home __ Clinic _x_ Telephonic __Other  Encounter Date:  17   Contact Type _x_ Case Closure __ Travel Time (1) __ Contact Patient (.5) __Coordination (.5) __ Paperwork (.5)      Interventions Date Addressed    _x_ Discharge Rationale:    _x_ Patient is well educated on their condition and understands all standards of care, schedule of tests, appointments, etc.   _x_ Patient has a self management action plan in place ( is aware of the steps to take if they get sick and how it may affect their condition)  _x_ Patient has all medications filled and can verbalize when and how to take them.   __ All referrals are in place for , community resources, and/or available programs.  _x_ Patient knows when their next  medical visit should be and know how to schedule   __ Patient is active in the  chronic condition support program (if available)  __ Other       17   _x_ Case Closure Criteria:    _x_ Pt.-specific goals met  _x_ Pt./family demonstrate ability to independently manage health issues  __ Pt. dis-enrolled from health plan  __ Pt.   __ Pt. refuses to continue CM  __ Pt. moved out of service area  __ Pt. needs met by other outpatient providers  __ Pt. moved to long-term care  __ Other, explain     17   _x_ Discharge Disposition:     _x_ Self care    __ Referred to community services   __ [facility]  __ [facility]   __ Follow-up with Luis A Care Manager   __     __ Declined Services    __ Disenrolled   __ Other     17   Summary:  17-Called and talked to patient. Answered all her questions will close case at this time. Will dis enroll patient at this time.               Plan:  Problem 3-Discharge hospital instructions -- MET   Problem 1- Diabetes education  Problem 2- Blood Pressure education       Problem  1    Discharge Instructions - Recent Hospitalization   Short Term Goal Patient/Caregiver will:   Be able to verbalize _amoxicillin and colace/metamucil _______ medication dosage/route frequency within _now_.  (__Partially met _x__Met __Not met ___ Deferred)     Will verbalize _2_  interventions of preventing complications due to disease process in now  (__Partially met _x__Met __Not met ___ Deferred)         Long Term Goals Ensure patients who transition from one site of care to the next have the tools and knowledge to be successful with on-going self management.      Goal Met _x_Yes  __No   Reason:      Interventions Date Addressed    x__Compare pre-hospital medications with medications on hospital discharge list 16   _x_ Identify medications that were prescribed but not  "obtained   12/05/16   __ Identify medication discrepancies and develop a plan to resolve discrepancies      _x_ Answer questions about medications   12/05/16   __ Alert patient to potential adverse drug reaction(s)      __ Encourage  use of patient's medication management "system"       __ Identify medications needing refills and/or barriers to refill    _x_ Encourage patient to set up follow-up appointment   12/05/16   _x_ Discuss questions with patient for PCP or specialist visit   12/05/16   __ Clarify whether patient will need to obtain follow up tests and/or results      __ Provide teaching for how to obtain follow-up tests and results      _x_ Review discharge instructions   12/05/16   _x_ Discuss patients conditions and self management of condition(s)    12/05/16   _x_ Discuss target symptoms / side effects to monitor and what to do should they arise   12/05/16   _x_ Discuss when PCP should be called   12/05/16   _x_Discuss pain management.    12/05/16   _x_ Discuss constipation    12/05/16   __ Discuss patient's personal goal and possible steps for achieving      __ Ensure Durable Medical Equipment is delivered      __ Ensure [facility] is involved if appropriate      _x_ Determine adequacy of support system and need for ongoing case management   12/05/16   __ Connect patient to necessary community resources      __    __    __        Problem   1 Diabetes   Short Term Goal Patient/Caregiver will:   Check and record blood glucose  _1_  times per day for _2_ weeks  (_x_Partially met _x__Met __Not met ___ Deferred)   Verbalize _2_ signs and symptoms of hypo/hyper glycemia _2 _ weeks  (_x_Partially met _x__Met __Not met ___ Deferred)     Verbalize current A1C and understanding of recommended value in _2_ weeks  (_x_Partially met _x__Met __Not met ___ Deferred)      Diet and Meal Planning  Patient/Caregiver will:   Replace _carbohydrates _____________ with _fruits and vegetables ___________ for _2  meals for " "_2_  Weeks.   (_x_Partially met _x__Met __Not met ___ Deferred)     Choose ____diabetic ______ grocery list items to prepare diet meals in next _2_ weeks  (_x_Partially met _x__Met __Not met ___ Deferred)      Exercise and Weight  Patient/Caregiver will:   Exercise _walk __ for _10 _ minutes a day _3_ times a week  o Example: Yoga, Walk, Jog, etc.  (_x_Partially met _x__Met __Not met ___ Deferred     Weigh Daily/Weekly and record weight for _6_ weeks  (_x_Partially met _x__Met __Not met ___ Deferred)     Gain/Lose _2_ pounds  by Exercising/Walking in _4_ weeks  (_x_Partially met _x__Met __Not met ___ Deferred)    Medications  Patient/Caregiver will:   Be able to verbalize _glucotrol and glucophage_______ medication dosage/route frequency within _2_ weeks  (_x_Partially met _x__Met __Not met ___ Deferred)      Self-Care  Patient/Caregiver will:   Will follow-up with PCP to coordinate referrals to _LABS __________ (Ex: Ophthalmology, Podiatry, Labs, Dietician) within _2 weeks  (_x_Partially met _x__Met __Not met ___ Deferred)     Will verbalize _2_  interventions of preventing complications due to disease process in _4_ weeks  (_x_Partially met _x__Met __Not met ___ Deferred)    Complications and Co-Morbidities  Patient/Caregiver will:   Can verbalize _2_ long-term complications of diabetes in _4_ weeks  (_x_Partially met _x__Met __Not met ___ Deferred)   Can verbalize _2_ interventions to keep Feet/Eyes healthy in _4_  weeks  (_xPartially met _x__Met __Not met __ Deferred)     Long Term Goals Patient will get A1C tested every 6 months and is results ? 8 will make appoint with PCP within 2 weeks   Goals Met  x Partially met  x Not met Deferred    Interventions Date Addressed    __ Review with and provide education materials to pt. Types of Diabetes: "Pre Diabetes" (LENNY)    __ Review with and provide education materials to pt. Types of Diabetes: "What is Type 1 Diabetes?" (LENNY)    _x_ Review with and " "provide education materials to pt. Types of Diabetes: "What is Type 2 Diabetes?" ([facility] 08/11/16 09/23/16   _x_ Review with and provide education materials to pt. Resources for People with Diabetes ([facility]) 07/22/16 09/23/16     Glucose Monitoring    _x_ Discuss glucometer monitoring  07/05/16  07/22/16  08/11/16  07/26/16  09/09/16  09/23/16  10/10/16  11/03/16  12/05/16  01/05/17  01/31/17  02/20/17  03/02/17   _x_ Educate regarding physicians recommended BS range 07/05/16  07/22/16  08/11/16  09/09/16  09/23/16  10/10/16  01/05/17  01/31/17  02/20/17   _x_ Review with and provide education materials to pt. Review with and provide education materials to pt. Blood Sugar Management: Hypoglycemia (Low Blood Sugar) ([facility] 07/05/16 07/22/16 08/11/16 08/26/16 09/23/16 12/05/16 01/05/17 01/31/17   _x_ Review with and provide education materials to pt. Review with and provide education materials to pt. Blood Sugar Management: Hyperglycemia (High Blood Sugar) ([facility] 07/05/16 07/22/16 01/05/17 01/31/17 03/02/17   _x_ Review with and provide education materials to pt. Blood Sugar Management: Using a Blood Sugar Log  ([facility] 07/05/16 01/31/17   _x_  Review with and provide education materials to pt. Blood Sugar Management: Managing Diabetes: The A1C Test ([facility] 07/05/16 07/22/16 08/11/16 08/26/16 11/03/16 01/05/17 01/31/17 02/20/17   Diet and Meal Planning     _x_ Review with and provide education materials to pt. Diet and Meal Planning - "Healthy Meals for Diabetes" ([facility]) 07/05/16  07/22/16  08/11/16  08/26/16  09/23/16  10/10/16  11/03/16  12/05/16  01/05/17  01/31/17  02/20/17  03/02/17   _x Review with and provide education materials to pt. Diet and Meal Planning - "Diabetes: Understanding Carbohydrates"  and Understanding Carbohydrates, Fats and Protein, (LENNY) 07/05/16      _x_ Review with and provide education materials to pt. Diet and Meal Planning - "Eating Out When You Have " "Diabetes" (LENNY) 08/11/16 08/26/16 01/05/17 03/02/17   _x_ Review with and provide education materials to pt. Diet and Meal Planning - "Learning About Serving and Portion Sizes"  (LENNY) 07/05/16 08/11/16 03/02/17     _x_ Review with and provide education materials to pt. Diet and Meal Planning - "Diabetes: Shopping for and Preparing Meals"  (LENNY) 07/22/16 08/26/16 11/03/16 01/31/17 02/20/17     _x_ Review with and provide education materials to pt. Diet and Meal Planning - "Diabetes: Learning About Serving and Portion Sizes"  (LENNY) 12/05/16 01/05/17 02/20/17   _x_ Review with and provide education materials to pt. Diet and Meal Planning - Grocery List   11/03/16   _x_Review with and provide education materials to pt. Review with and provide education materials to pt. Diet and Meal Planning - "Eating a High Fiber Diet"  (LENNY) 08/11/16 12/05/16 01/31/17   _x_ Review with and provide education materials to pt. Diet and Meal Planning - "Diabetes: Meal Planning"  (LENNY) 08/11/16 02/20/17 03/02/17   __ Review with and provide education materials to pt. Complications & Co morbidities - "Diabetes and Alcohol Consumption" (LENNY)    Exercise and Weight    _x_ Review with and provide education materials to pt. Exercise: "Exercise to Manage Your Blood Sugar" (LENNY) 07/22/16 08/11/16 08/26/16 01/31/17 02/20/17   _x_ Review with and provide education materials to pt. Exercise: "Diabetes: The Benefits of Exercise" (LENNY) 08/26/16 01/31/17   __ Review with and provide education materials to pt. Exercise: "Before You Start With Diabetes Exercise Plan" (LENNY)    _x_ eview with and provide education materials to pt. Exercise: "Diabetes: Getting Started with Exercise" (LENNY) 08/11/16   _x_ Review with and provide education materials to pt. Exercise: "Diabetes: Activity Tips" (LENNY) 08/26/16   _x_ Review with and provide education materials to pt. Exercise: "Diabetes: Tracking Your Fitness " "Progress" ([facility] 08/26/16   __ Review with and provide education materials to pt. Exercise: "Type 1 Diabetes: Getting Active"  ([facility]     _x_ Review with and provide education materials to pt. Weight Management: "Finding Your Ideal Weight" ([facility] 08/26/16   Medications    __ Review with and provide education materials to pt. Medications: "Using Injected Insulin" (LENNY)    _x_ Review with and provide education materials to pt. Medications: "Oral Therapy for Type 2 Diabetes" ([facility] 07/05/16 08/11/16 08/26/16 09/09/16 11/03/16 02/20/17 03/02/17       __ Review with and provide education materials to pt. Medications: "Types of Insulin" ([facility]    __ Review with and provide education materials to pt. Medications: "Diabetes: Ways to Take Medication"  (LENNY)    _x_ Review with and provide education materials to pt. Medications: "Taking Medication for Diabetes"  ([facility] 07/05/16 07/22/16 08/11/16 08/26/16 09/09/16 02/20/17 03/02/17   Complications and Co morbidities    __ Review with and provide education materials to pt. Complications & Co morbidities - "After Leg Amputation: Keeping Your Other Leg Healthy" ([facility]    __ Review with and provide education materials to pt. Complications & Co morbidities - "Diabetic Retinopathy: Controlling Your Risk Factors" (LENNY)    _x_ Review with and provide education materials to pt. Complications & Co morbidities - "Long-term Complications of Diabetes" ([facility] 08/11/16 08/26/16 09/09/16   __ Review with and provide education materials to pt. Complications & Co morbidities - "Diabetic Retinopathy: Evaluating Your Eyes"([facility]    _x_ Review with and provide education materials to pt. Types of Diabetes: "Diabetic Retinopathy: Controlling Your Risk Factors" (LENNY) 08/26/16    Self  Care    _x_  Review with and provide education materials to pt. Self Care - "Diabetes: Keeping Your Feet Healthy" (LENNY) 07/22/16     _x_ Review with and provide education " "materials to pt. Self Care - "Your Diabetes Foot Care Program"  (LENNY) 08/26/16 09/09/16   _x_ Review with and provide education materials to pt. Self Care - "Diabetes: Caring For Your Body" (LENNY) 10/10/16  03/02/17   _x_ Review with and provide education materials to pt. Self Care - "Diabetes: Sick-Day Plan" (LENNY) 08/11/16 09/09/16 12/05/16 01/05/17 03/02/17   __ Review with and provide education materials to pt. Self Care - "Diabetes: Driving Issues" (LENNY)    __ Review with and provide education materials to pt. Self Care - "Diabetes: Living Your Life" (LENNY)    __ Review with and provide education materials to pt. Self Care - "Getting Support When You Have Diabetes" (LENNY)    _x_ Review with and provide education materials to pt. Self Care - "Managing Stress When You Have Diabetes" (LENNY) 09/23/16 11/03/16 01/05/17 02/20/17 03/02/17   _x_ Review with and provide education materials to pt. Self Care - "Planning for Travel When You Have Diabetes" (LENNY) 08/11/16 02/20/17   _x_ Review with and provide education materials to pt. Types of Diabetes: "Your Diabetes Toolkit" (LENNY) 01/31/17         Referrals/Management    __ Facilitate referral to diabetic class    __ Facilitate obtaining DM supplies    __ Facilitate referral to podiatrist    __ Monitor DM management (HgA1c, dental exam 2xs/yr, annual flu shot, annual eye exam, annual comprehensive foot exam)    _x_ Encourage regular medical appointments 07/05/16  08/11/16  09/09/16  09/23/16  10/10/16  12/05/16  01/05/17  01/31/17  02/20/17  03/02/17                             Problem   2  Blood Pressure Control   Short Term Goal   Patient/Caregiver will:   Will measure and record blood pressure daily for 2 weeks..  (_x_Partially met _x__Met __Not met ___ Deferred)   Will verbalize_2_   ways to reduce stress in _12_ weeks.  (_x_Partially met __x_Met __Not met ___ Deferred)   Will verbalize _2_  risk factors within _6_ " "weeks.  (_x_Partially met _x__Met __Not met ___ Deferred)  Patient/Caregiver will:   Be able to verbalize norvasc________ medication dosage/route frequency within _2_ weeks  (__Partially met _x__Met __Not met ___ Deferred)      Complications and Co-Morbidities  Patient/Caregiver will:    verbalize _2_ long-term complications of hypertension in _4_ weeks  (_x_Partially met _x__Met __Not met ___ Deferred)   Can verbalize _2_ interventions to keep blood pressure under control  in _8  weeks  (_x_Partially met _x__Met __Not met ___ Deferred)     Long Term Goals Patient/Caregiver will check and record blood pressure daily. If > 140/90 for 3 days, will notify Physician.   Goal Met Met  x Partially met   Not Met Deferred    Interventions Date Addressed    _x_ Review and provide education materials with pt. "Controlling High Blood Pressure" ([facility])   07/05/16  08/11/16  08/26/16  09/23/16  10/10/16  12/05/16  01/05/17  01/31/17     _x_ Review and provide education materials with pt. "Your High Blood Pressure Risk Factors" ([facility])   08/11/16  10/10/16  12/05/16  01/05/17  01/31/17   _x_ Review and provide education materials with pt. "Taking Your Blood Pressure" ([facility])   07/05/16 07/22/16 09/09/16 12/05/16 01/05/17 01/31/17     _x_ Review and provide education materials with pt. "What is High Blood Pressure" ([facility] [place]   07/05/16  09/09/16  10/10/16  12/05/16  01/05/17  01/31/17   __ Review and provide education materials with pt. "High Blood Pressure and Peripheral Arterial Disease" ([facility])      _x_ Encourage medication compliance   07/05/16  09/09/16  08/11/16  08/26/16  09/23/16  10/10/16  12/05/16  01/05/17  01/31/17   _x_ Discuss s/s of hypotension (weakness, dizziness, mental changes, bradycardia, nausea, vomiting)   07/22/16      _x_ Discuss s/s of hypertension and to call MD immediately if /110 or high BP causing headaches or other symptoms.   " 07/05/16  07/22/16  10/10/16  12/05/16  01/05/17  01/31/17     _x_ Educate Pt/family to predisposing controllable factors   07/05/16  08/11/16  10/10/16  01/05/17     __    __    ___

## 2017-03-01 NOTE — TELEPHONE ENCOUNTER
Talked with [facility]. Codeine butalbital ASA caffeine every 4hours PRN written 2/16/17 sent to mail order instead of local pharmacy. Written for #30 tablets, cannot be sent by mail order. Please send to local pharmacy

## 2017-03-02 DIAGNOSIS — I10 ESSENTIAL HYPERTENSION: Primary | ICD-10-CM

## 2017-03-02 DIAGNOSIS — G43.909 MIGRAINE WITHOUT STATUS MIGRAINOSUS, NOT INTRACTABLE, UNSPECIFIED MIGRAINE TYPE: ICD-10-CM

## 2017-03-02 RX ORDER — FUROSEMIDE 20 MG/1
20 TABLET ORAL DAILY
Qty: 30 TABLET | Refills: 11 | Status: SHIPPED | OUTPATIENT
Start: 2017-03-02 | End: 2017-03-17 | Stop reason: SDUPTHER

## 2017-03-02 RX ORDER — BUTALBITAL, ACETAMINOPHEN, CAFFEINE AND CODEINE PHOSPHATE 50; 325; 40; 30 MG/1; MG/1; MG/1; MG/1
1 CAPSULE ORAL EVERY 12 HOURS PRN
Qty: 30 EACH | Refills: 0 | Status: SHIPPED | OUTPATIENT
Start: 2017-03-02 | End: 2017-10-12 | Stop reason: ALTCHOICE

## 2017-03-02 RX ORDER — FUROSEMIDE 40 MG/1
40 TABLET ORAL DAILY
Qty: 45 TABLET | Refills: 0 | Status: SHIPPED | OUTPATIENT
Start: 2017-03-02 | End: 2017-03-02

## 2017-03-02 NOTE — TELEPHONE ENCOUNTER
----- Message from Erika Tavares sent at 3/2/2017  9:35 AM CST -----  Contact: david ramirez called to verify directions on medication (lasix) please call back to advise at [phone]. Thanks,

## 2017-03-02 NOTE — TELEPHONE ENCOUNTER
BP change is not appropriate. She has been very hard to control.  Please try and get prior authorization for current hypertension medications.  Will call and headache medication to local pharmacy.

## 2017-03-03 NOTE — TELEPHONE ENCOUNTER
PA Procardia denied; filed appeal with [employer]; faxed chart notes for review. Determination within 72hours

## 2017-03-06 ENCOUNTER — TELEPHONE (OUTPATIENT)
Dept: FAMILY MEDICINE | Facility: CLINIC | Age: 52
End: 2017-03-06

## 2017-03-06 NOTE — TELEPHONE ENCOUNTER
Phone message that [facility] insurance has authorized Nifedipine 1/1/17 through 12/31/17

## 2017-03-07 NOTE — TELEPHONE ENCOUNTER
----- Message from Natalie Ojeda sent at 3/6/2017  3:03 PM CST -----  Contact: self   Placed call to pod, patient missed call from your office please call back at [phone] (home)

## 2017-03-08 ENCOUNTER — TELEPHONE (OUTPATIENT)
Dept: FAMILY MEDICINE | Facility: CLINIC | Age: 52
End: 2017-03-08

## 2017-03-08 NOTE — TELEPHONE ENCOUNTER
----- Message from Astrid Sanchez sent at 3/8/2017  9:55 AM CST -----  Call [phone]  Spoke to Miss Price yesterday ... Had advised that the prescription had been approved for a lower cost / she called [facility] and they are telling her they have no record

## 2017-03-08 NOTE — TELEPHONE ENCOUNTER
Patient says she calls [facility] and told no record of Nifedipine approval. I spoke with [facility] and am told that this is now tier 2 medication, but patient will need to call for price. Unable to tell me if RX from 2/2017 can be filled. Patient needs to call [phone]. Talked with patient; she will call for price; Gave her appeal file number #[id number] for clarification.

## 2017-03-08 NOTE — TELEPHONE ENCOUNTER
----- Message from Mary Plummer sent at 3/8/2017  1:01 PM CST -----  Contact: Pt  Pt is returning the nurse call. Pt can be reached at [phone].

## 2017-03-08 NOTE — TELEPHONE ENCOUNTER
----- Message from Astrid Sanchez sent at 3/8/2017  9:55 AM CST -----  Call [phone]  Spoke to Miss rPice yesterday ... Had advised that the prescription had been approved for a lower cost / she called [facility] and they are telling her they have no record

## 2017-03-17 ENCOUNTER — TELEPHONE (OUTPATIENT)
Dept: FAMILY MEDICINE | Facility: CLINIC | Age: 52
End: 2017-03-17

## 2017-03-17 DIAGNOSIS — I10 ESSENTIAL HYPERTENSION: ICD-10-CM

## 2017-03-17 RX ORDER — FUROSEMIDE 20 MG/1
20 TABLET ORAL DAILY
Qty: 90 TABLET | Refills: 1 | Status: SHIPPED | OUTPATIENT
Start: 2017-03-17 | End: 2017-11-30 | Stop reason: SDUPTHER

## 2017-03-17 NOTE — TELEPHONE ENCOUNTER
----- Message from Keli Martinez sent at 3/16/2017  3:34 PM CDT -----  Contact: self  Has questions regarding blood pressure meds.  Please call back at

## 2017-03-17 NOTE — TELEPHONE ENCOUNTER
Patient told by [facility] that she is taking combination BP med and taking HCTZ in addition is too much. Checked current/historical medication list and do not see combo medication on her list. States she'll contact [facility] with information. She also asks that Lasix be sent to [facility] for refill. Sent for review.

## 2017-03-23 ENCOUNTER — OFFICE VISIT (OUTPATIENT)
Dept: FAMILY MEDICINE | Facility: CLINIC | Age: 52
End: 2017-03-23
Payer: MEDICARE

## 2017-03-23 ENCOUNTER — DOCUMENTATION ONLY (OUTPATIENT)
Dept: FAMILY MEDICINE | Facility: CLINIC | Age: 52
End: 2017-03-23

## 2017-03-23 VITALS
DIASTOLIC BLOOD PRESSURE: 70 MMHG | HEIGHT: 67 IN | HEART RATE: 88 BPM | BODY MASS INDEX: 31.18 KG/M2 | WEIGHT: 198.63 LBS | TEMPERATURE: 98 F | SYSTOLIC BLOOD PRESSURE: 144 MMHG

## 2017-03-23 DIAGNOSIS — R10.9 ABDOMINAL PAIN, UNSPECIFIED LOCATION: Primary | ICD-10-CM

## 2017-03-23 DIAGNOSIS — R11.2 NAUSEA AND VOMITING, INTRACTABILITY OF VOMITING NOT SPECIFIED, UNSPECIFIED VOMITING TYPE: ICD-10-CM

## 2017-03-23 PROCEDURE — 1160F RVW MEDS BY RX/DR IN RCRD: CPT | Mod: S$GLB,,, | Performed by: PHYSICIAN ASSISTANT

## 2017-03-23 PROCEDURE — 99999 PR PBB SHADOW E&M-EST. PATIENT-LVL IV: CPT | Mod: PBBFAC,,, | Performed by: PHYSICIAN ASSISTANT

## 2017-03-23 PROCEDURE — 99213 OFFICE O/P EST LOW 20 MIN: CPT | Mod: S$GLB,,, | Performed by: PHYSICIAN ASSISTANT

## 2017-03-23 PROCEDURE — 3078F DIAST BP <80 MM HG: CPT | Mod: S$GLB,,, | Performed by: PHYSICIAN ASSISTANT

## 2017-03-23 PROCEDURE — 3077F SYST BP >= 140 MM HG: CPT | Mod: S$GLB,,, | Performed by: PHYSICIAN ASSISTANT

## 2017-03-23 NOTE — PROGRESS NOTES
Pre-Visit Chart Review  For Appointment Scheduled on 03/23/2017      Health Maintenance Due   Topic Date Due    Eye Exam  08/12/1975    TETANUS VACCINE  08/12/1983    Pneumococcal PPSV23 (Medium Risk) (1) 08/12/1983    Mammogram  03/24/2017

## 2017-03-23 NOTE — PROGRESS NOTES
Subjective:       Patient ID: Leanna García is a 51 y.o. female.    Chief Complaint: Abdominal Pain (right)    Abdominal Pain   This is a new problem. The current episode started today. The onset quality is sudden. The problem occurs constantly. The problem has been unchanged. The pain is located in the generalized abdominal region. The pain is at a severity of 1/10. The pain is moderate. The quality of the pain is aching. The abdominal pain does not radiate. Associated symptoms include nausea and vomiting. Pertinent negatives include no anorexia, arthralgias, belching, constipation, diarrhea, fever or hematuria. Associated symptoms comments: vertigo. Nothing aggravates the pain. Treatments tried: Zofran. The treatment provided no relief.     Review of Systems   Constitutional: Negative for activity change, appetite change, chills, fatigue and fever.   Respiratory: Negative for chest tightness, shortness of breath and wheezing.    Cardiovascular: Negative for chest pain, palpitations and leg swelling.   Gastrointestinal: Positive for abdominal pain, nausea and vomiting. Negative for abdominal distention, anal bleeding, anorexia, blood in stool, constipation, diarrhea and rectal pain.   Genitourinary: Negative.  Negative for hematuria.   Musculoskeletal: Negative for arthralgias.       Objective:      Physical Exam   Constitutional: She appears well-developed and well-nourished. No distress.   HENT:   Head: Normocephalic and atraumatic.   Right Ear: External ear normal.   Left Ear: External ear normal.   Eyes: Conjunctivae and EOM are normal. Pupils are equal, round, and reactive to light.   Cardiovascular: Normal rate and regular rhythm.  Exam reveals no gallop and no friction rub.    No murmur heard.  Pulmonary/Chest: Effort normal and breath sounds normal.   Abdominal: Soft. Bowel sounds are normal. She exhibits no distension and no mass. There is no tenderness. There is no rebound and no guarding.    Musculoskeletal:   No CVA tenderness   Skin: She is not diaphoretic.       Assessment:       1. Abdominal pain, unspecified location    2. Nausea and vomiting, intractability of vomiting not specified, unspecified vomiting type        Plan:       Leanna was seen today for abdominal pain.    Diagnoses and all orders for this visit:    Abdominal pain, unspecified location    Nausea and vomiting, intractability of vomiting not specified, unspecified vomiting type    Zofran if needed.  [place] diet, Increase water intake.  Call clinic if symptoms recur or new symptoms develop.

## 2017-04-10 ENCOUNTER — OFFICE VISIT (OUTPATIENT)
Dept: CARDIOLOGY | Facility: CLINIC | Age: 52
End: 2017-04-10
Payer: MEDICARE

## 2017-04-10 DIAGNOSIS — E65 ABDOMINAL OBESITY: ICD-10-CM

## 2017-04-10 DIAGNOSIS — R60.0 PERIPHERAL EDEMA: ICD-10-CM

## 2017-04-10 DIAGNOSIS — E11.59 HYPERTENSION ASSOCIATED WITH DIABETES: Chronic | ICD-10-CM

## 2017-04-10 DIAGNOSIS — E78.01 FAMILIAL HYPERCHOLESTEREMIA: ICD-10-CM

## 2017-04-10 DIAGNOSIS — Z91.89 CARDIOVASCULAR EVENT RISK: Primary | ICD-10-CM

## 2017-04-10 DIAGNOSIS — R35.1 NOCTURIA: ICD-10-CM

## 2017-04-10 DIAGNOSIS — I15.2 HYPERTENSION ASSOCIATED WITH DIABETES: Chronic | ICD-10-CM

## 2017-04-10 DIAGNOSIS — Z86.79 HISTORY OF ATRIAL FIBRILLATION: ICD-10-CM

## 2017-04-10 DIAGNOSIS — G47.19 EXCESSIVE DAYTIME SLEEPINESS: ICD-10-CM

## 2017-04-10 PROCEDURE — 99999 PR PBB SHADOW E&M-EST. PATIENT-LVL I: CPT | Mod: PBBFAC,,, | Performed by: INTERNAL MEDICINE

## 2017-04-10 PROCEDURE — 3046F HEMOGLOBIN A1C LEVEL >9.0%: CPT | Mod: S$GLB,,, | Performed by: INTERNAL MEDICINE

## 2017-04-10 PROCEDURE — 3066F NEPHROPATHY DOC TX: CPT | Mod: S$GLB,,, | Performed by: INTERNAL MEDICINE

## 2017-04-10 PROCEDURE — 99215 OFFICE O/P EST HI 40 MIN: CPT | Mod: S$GLB,,, | Performed by: INTERNAL MEDICINE

## 2017-04-10 PROCEDURE — 1160F RVW MEDS BY RX/DR IN RCRD: CPT | Mod: S$GLB,,, | Performed by: INTERNAL MEDICINE

## 2017-04-10 NOTE — MR AVS SNAPSHOT
[facility]  [address], [address]  [place] [address]  Phone: [phone]                  Leanna García   4/10/2017 3:00 PM   Office Visit    Description:  Female : 1965   Provider:  Woody Parry MD   Department:  [facility]           Reason for Visit     Consult           Diagnoses this Visit        Comments    Cardiovascular event risk    -  Primary     Hypertension associated with diabetes         Familial hypercholesteremia         Abdominal obesity         History of atrial fibrillation         Excessive daytime sleepiness         Nocturia         Peripheral edema                To Do List           Future Appointments        Provider Department Dept Phone    2017 4:00 PM ECHO, [facility] [phone]    2017 9:00 AM LAB, [facility] [phone]    2017 10:30 AM LAB, [facility] [phone]    2017 11:20 AM MD [facility] - Family [facility] [phone]      Goals (5 Years of Data)     None      Follow-Up and Disposition     Return in about 1 year (around 4/10/2018).    Follow-up and Disposition History      [facility]     [facility] -  Assistance  Unless otherwise directed by your provider, please contact [facility], our nurse care line that is available for  assistance.     Registered nurses in the [facility] provide: appointment scheduling, clinical advisement, health education, and other advisory services.  Call: [phone] (toll free)               Medications           Message regarding Medications     Verify the changes and/or additions to your medication regime listed below are the same as discussed with your clinician today.  If any of these changes or additions are incorrect, please notify your healthcare provider.             Verify that the below list of medications is an accurate representation of  the medications you are currently taking.  If none reported, the list may be blank. If incorrect, please contact your healthcare provider. Carry this list with you in case of emergency.           Current Medications     albuterol (PROVENTIL) 2.5 mg /3 mL (0.083 %) nebulizer solution Take 2.5 mg by nebulization 2 (two) times daily as needed for Wheezing.    albuterol 90 mcg/actuation inhaler Inhale 2 puffs into the lungs 4 (four) times daily.    aspirin (ECOTRIN) 81 MG EC tablet Take 81 mg by mouth once daily.    atorvastatin (LIPITOR) 80 MG tablet Take 1 tablet (80 mg total) by mouth once daily.    ergocalciferol (ERGOCALCIFEROL) 50,000 unit Cap Take 1 capsule (50,000 Units total) by mouth every 7 days.    escitalopram oxalate (LEXAPRO) 10 MG tablet Take 1 tablet (10 mg total) by mouth every evening.    fenofibrate 160 MG Tab Take 1 tablet (160 mg total) by mouth once daily.    fish oil-omega-3 fatty acids 300-1,000 mg capsule Take 2 g by mouth once daily. Stop taking until after surgery.    furosemide (LASIX) 20 MG tablet Take 1 tablet (20 mg total) by mouth once daily.    glipiZIDE (GLUCOTROL) 10 MG tablet Take 1 tablet (10 mg total) by mouth 2 (two) times daily before meals.    hydrochlorothiazide (HYDRODIURIL) 12.5 MG Tab Take 1 tablet (12.5 mg total) by mouth once daily.    losartan (COZAAR) 100 MG tablet Take 1 tablet (100 mg total) by mouth once daily.    metformin (GLUCOPHAGE) 500 MG tablet Take 2 tablets (1,000 mg total) by mouth 2 (two) times daily with meals.    metoprolol tartrate (LOPRESSOR) 25 MG tablet Take 1 tablet (25 mg total) by mouth 2 (two) times daily.    nifedipine (PROCARDIA-XL) 60 MG (OSM) 24 hr tablet Take 1 tablet (60 mg total) by mouth once daily.    ondansetron (ZOFRAN-ODT) 4 MG TbDL Take 1 tablet (4 mg total) by mouth every 6 (six) hours as needed.    polyethylene glycol (GLYCOLAX) 17 gram/dose powder Take 17 g by mouth once daily. 2 x daily for 2 weeks then once daily for 4 weeks, hold  if loose bm and decrease to every other day    blood sugar diagnostic Strp Inject 1 strip into the skin 3 (three) times daily. TrueResult Glucometer Strips.  Testing Daily.    butalbital-acetaminop-caf-cod -18-30 mg Cap Take 1 tablet by mouth every 12 (twelve) hours as needed.    lancets Misc Use to test blood sugar daily    True Results  DX:E11.9    TRUEPLUS LANCETS 33 gauge Misc Inject 1 lancet into the skin 3 (three) times daily.           Clinical Reference Information           Blood Pressure          Most Recent Value    Right Arm BP - Sitting  145/74    Left Arm BP - Sitting  128/66      Allergies as of 4/10/2017     Dilaudid [Hydromorphone (Bulk)]    Lortab [Hydrocodone-acetaminophen]      Immunizations Administered on Date of Encounter - 4/10/2017     None      Orders Placed During Today's Visit     Future Labs/Procedures Expected by Expires    Holter monitor - 48 hour  4/17/2017 4/10/2018    Lipid panel  5/17/2017 6/9/2018      Instructions    Recommended Mediterranean dietEating Heart-Healthy Food: Using the DASH Plan  Eating for your heart doesnt have to be hard or boring. You just need to know how to make healthier choices. The DASH eating plan has been developed to help you do just that. DASH stands for Dietary Approaches to Stop Hypertension. It is a plan that has been proven to be healthier for your heart and to lower your risk for high blood pressure. It can also help lower your risk for cancer, heart disease, osteoporosis, and diabetes.  Choosing from Each Food Group  Choose foods from each of the food groups below each day. Try to get the recommended number of servings for each food group. The serving numbers are based on a diet of 2,000 calories a day. Talk to your doctor if youre unsure about your calorie needs.  Grains   Servings: 7-8 a day  A serving is:  · 1 slice bread  · 1 ounce dry cereal  · half a cup cooked rice or pasta  Best choices: Whole grains and any grains high in fiber.   Vegetables   Servings: 4-5 a day  A serving is:  · 1 cup raw leafy vegetable  · Half a cup cooked vegetable  · Three-quarter cup vegetable juice  Best choices: Fresh or frozen vegetable prepared without too much added salt or fat.    Fruits   Servings: 4-5 a day  A serving is:  · Three-quarter cup fruit juice  · 1 medium fruit  · One-quarter cup dried fruit  · One-half cup fresh, frozen, or canned fruit  Best choices: A variety of fresh fruits of different colors. Whole fruits are a much better choice than fruit juices.  Low-fat or Fat Free Dairy   Servings: 2-3 a day  A serving is:  · 8 ounces milk  · 1 cup yogurt  · One and a half ounces cheese  Best choices: Skim or 1% milk, low-fat or fat free yogurt or buttermilk, and low-fat cheeses.       Meat, Poultry, Fish   Servings: 2 or fewer a day  A serving is:  · 3 ounces cooked meat, poultry, or fish  Best choices: Lean meats and fish. Trim away visible fat. Broil, roast, or boil instead of frying. Remove skin from poultry before eating.  Nuts, Seeds, Beans   Servings: 4-5 a week  A serving is:  · One third cup nuts (or one and a half ounces)  · 2 tablespoons sunflower seeds  · Half a cup cooked beans  Best choices: Dry roasted nuts with no salt added, lentils, kidney beans, garbanzo beans, and whole stuart beans.    Fats and Oils   Servings: 2 a day  A serving is:  · 1 teaspoon vegetable oil  · 1 teaspoon soft margarine  · 1 tablespoon low-fat mayonnaise  · 1 teaspoon regular mayonnaise  · 2 tablespoons light salad dressing  · 1 tablespoon regular salad dressing  Best choices: Monounsaturated and polyunsaturated fats such as olive, canola, or safflower oil.  Sweets   Servings: 5 a week or fewer  A serving is:  · 1 tablespoon sugar, maple syrup, or honey  · 1 tablespoon jam or jelly  · 1 half-ounce jelly beans (about 15)  · 8 ounces lemonade  Best choices: Dried fruit can be a satisfying sweet. Choose low-fat sweets when possible. And watch your serving sizes!        Aerobic Exercise for a Healthy Heart  Exercise is a lot more than an energy booster and a stress reliever. It also strengthens your heart muscle, lowers your blood pressure and blood cholesterol, and burns calories.      Remember, some activity is better than none.     Choose an Aerobic Activity  Choose a nonstop activity that makes your heart and lungs work harder than they do when you rest or walk normally. This aerobic exercise can improve the way your heart and other muscles use oxygen. Make it fun by exercising with a friend and choosing an activity you enjoy. Here are some ideas:  · Walking  · Swimming  · Bicycling  · Stair climbing  · Dancing  · Jogging  Exercise Regularly  If you havent been exercising regularly,  get your doctors okay first. Then start slowly.  Here are some tips:  · Begin exercising 3 times a week for 5-10 minutes at a time.  · When you feel comfortable, add a few minutes each week.  · Slowly build up to exercising 3-4 times each week for 20-40 minutes. Aim for a total of 150 or more minutes a week.  · Be sure to carry your nitroglycerin with you when you exercise.  · If you get angina when youre exercising, stop what youre doing, take your nitroglycerin, and call your doctor.  © 4531-6768 [facility], [address] [place] [address], [place] [address]. All rights reserved. This information is not intended as a substitute for professional medical care. Always follow your healthcare professional's instructions.  Losing Weight (Cardiovascular)  Excess weight is a major risk factor for heart disease. Losing weight may help keep your arteries open so that your heart can get the oxygen-rich blood it needs. Weight loss can also help lower your blood pressure and reduce your risk for diabetes. All in all, losing weight makes you healthier.          Exercise with a friend. When activity is fun, you're more likely to stick with it.        Calories and Weight Loss  Calories are the fuel your body  burns for energy. You get the calories you need from the food you eat. For healthy weight loss, women should eat at least 1,200 calories a day, men at least 1,500.    When you eat more calories than you need, your body stores the extra calories as fat. One pound of fat equals 3,500 calories.    To lose weight, try to burn 500 calories a day more than you eat. To do this, eat 250 calories less each day. Add activity to burn the other 250 calories. Walking 21/2 miles burns about 250 calories.    Eat a variety of healthy foods. Its the best way to make calories count.     Tips for losing weight:  Drink 8 to 10 glasses of water a day.    Dont skip meals. Instead, eat smaller portions.       Brisk Activity Is Best  Brisk activity gets your heart pumping faster. It makes your heart healthier. Its also the best way to burn calories. In fact, your body may keep burning calories for hours after you stop a brisk activity.    Begin by walking 10 minutes most days.    Add more time and speed to your walk. Build up as you feel able.    Try to walk briskly at least 30 minutes most days. If needed, you can break this into 2 shorter sessions.     Check off the ideas below that you could try to make your day more active:    Take the stairs instead of the elevator.    Park your car farther away and walk.    Ride a bike to work or to the store.    Walk laps around the mall.       Language Assistance Services     ATTENTION: Language assistance services are available, free of charge. Please call [phone].      ATENCIÓN: Si habla caryn, tiene a pretty disposición servicios gratuitos de asistencia lingüística. Llame al [phone].     [facility] Ý: N?u b?n nói Ti?ng Vi?t, có các d?ch v? h? tr? ngôn ng? mi?n phí dành cho b?n. G?i s? [phone].         [facility] complies with applicable Federal civil rights laws and does not discriminate on the basis of race, color, national origin, age, disability, or  sex.

## 2017-04-10 NOTE — PROGRESS NOTES
"Subjective:    Patient ID:  Leanna García is a 51 y.o. female who presents for follow-up of Consult  For cardiac review, ASCVD 10-year event risk   PCP and referred by Dr. Lomax  Renal: Dr. Carlson  GI: Dr. Chanel  Surgery:  Zach   Lives with , Donovan, non-smoker now have coronary stentsPatient is non-smoker   Disabled since 2008, chronic back problem    Last seen in [facility] in 10/2016    HPI Comments: WF here for cardiac review, history of CP admission to [facility] in 10/2016. Since then still an occasional chest twitch, very brief. ECG in 10/2016 was normal. Eating too much, do housework, no other exercise. No premature family history in the immediate family. Labs review with extremely high cholesterol, likely familial with , . Dr. Lomax started 80 mg of atorvastatin in 2/2017, lab to be done in 5/2017.  belief the referral for review and the PAF.  DCS - "51 y.o. female with PMHx significant for PAF, DM2, HTN, and hyperlipidemia who is admitted to the service of hospital medicine with chest pain. She reports awakening at 0400 with the feeling that "something was coming on." She reports she sat on her sofa and rested before returning to bed. Once back in bed she reawakened with sudden, sharp pain-9/10 in severity--to L scapular region, LEFT chest wall, and axilla. She experienced palpitations, SOB, and lightheadedness at this time. Her  called EMS who administered ASA and multiple doses of NTG per patient. She reports gradual cessation of pain at that time and approximates the pain lasted about 15 minutes total. She reports 1 additional episode of LEFT chest in ER that lasted approx. 30 minutes and was associated with increased BP. She reports having stress test 3ish years ago, which was negative. She has recently been treated for uncontrolled HTN. She reports historical Afib that "resolved" 2 years ago, was briefly anticoagulated with coumadin but denies continued anticoagulation or " "cardiology f/u.     Chest Pain-troponins neg; nuclear stress negative. Long discussion with patient-possible anxiety /gi . Daily asa, statin, b blocker,(atenolol changed to metoprolol,  bp control and dm control to prevent CAD/MI in future. Already on aceI and norvasc.   CXR -normal.   CKD3-avoid nephrotoxics, meds adjusted. Continue aceI.      Hyperk-treated with medications. Resolved   Has some ckd 2-. Diet with low k discussed.   HLD-severe. Statin ordered on dc.   HTN -uncontrolled. -recent RA ultrasound--normal. b blocker added   Encouraged  Med follow up and compliance diet/exercise."    Active problems and plans:  Recurrent atypical sharp CP, negative cardiac markers  Chest wall pain  ASCVD 10-year event risk 6.7%  Poorly controlled T2DM.   HTN  Plans: Continue Current Rx.  Have reassured patient and , the pain in not of cardiac origin.  Need good BP and DM control.  Encourage use of healthy diet and exercise, consider water aerobic in view of CLBP and diabetic peripheral neuropathy.    Cardiac workup in 10/2016  Lexiscan - Nuclear Quantitative Functional Analysis:   LVEF: 65 % (normal is 55 - 69)  LVED Volume: 89 ml (normal is 60 - 98)  LVES Volume: 31 ml (normal is 20 - 42)    Impression: NORMAL MYOCARDIAL PERFUSION  1. The perfusion scan is free of evidence for myocardial ischemia or injury.   2. Resting wall motion is physiologic.   3. Resting LV function is normal.  (normal is 55 - 69)  4. The ventricular volumes are normal at rest and stress.   5. The extracardiac distribution of radioactivity is normal.    ECHO CONCLUSIONS     1 - Concentric hypertrophy.     2 - Normal left ventricular systolic function (EF 60-65%).     3 - Normal left ventricular diastolic function.     4 - The estimated PA systolic pressure is 22 mmHg.     5 - Difficult apical windows.     Review of Systems   Constitution: Positive for diaphoresis (with low BS, twice in 18 months, down to 63). Negative for fever, weakness, " "malaise/fatigue, night sweats and weight gain.   HENT: Positive for headaches. Negative for nosebleeds and tinnitus.    Eyes: Negative for visual disturbance.   Cardiovascular: Negative for chest pain, claudication, cyanosis, dyspnea on exertion, irregular heartbeat, leg swelling, near-syncope, orthopnea, palpitations and paroxysmal nocturnal dyspnea.   Respiratory: Positive for snoring and wheezing. Negative for cough, shortness of breath and sleep disturbances due to breathing.    Endocrine: Negative for polydipsia and polyuria.   Hematologic/Lymphatic: Does not bruise/bleed easily.   Skin: Negative for color change, flushing, nail changes, poor wound healing and suspicious lesions.   Musculoskeletal: Positive for arthritis, back pain, falls (due to flat feet) and joint pain. Negative for gout, joint swelling, muscle cramps, muscle weakness and myalgias.   Gastrointestinal: Positive for bloating, constipation, diarrhea and nausea. Negative for heartburn, hematemesis, hematochezia and melena.   Genitourinary: Positive for frequency, incomplete emptying and nocturia (all night long).   Neurological: Positive for vertigo. Negative for disturbances in coordination, excessive daytime sleepiness, dizziness, focal weakness, light-headedness, loss of balance and numbness.   Psychiatric/Behavioral: Negative for depression and substance abuse. The patient has insomnia and is nervous/anxious.         Stress easily     Van Lear score 12     Objective:    Physical Exam   Constitutional: She is oriented to person, place, and time. She appears well-developed and well-nourished.   HENT:   Head: Normocephalic.   Eyes: Conjunctivae and EOM are normal. Pupils are equal, round, and reactive to light.   Neck: Normal range of motion. Neck supple. No JVD present. No thyromegaly present.   Circumference 15.5"   Cardiovascular: Normal rate, regular rhythm and intact distal pulses.  Exam reveals no gallop and no friction rub.    Murmur " "heard.   Medium-pitched machinery early systolic murmur is present with a grade of 1/6  at the upper right sternal border, upper left sternal border  Pulses:       Dorsalis pedis pulses are 1+ on the right side, and 1+ on the left side.        Posterior tibial pulses are 1+ on the right side, and 1+ on the left side.   Pulmonary/Chest: Effort normal and breath sounds normal. She has no rales. She exhibits no tenderness.   Abdominal: Soft. Bowel sounds are normal. There is no tenderness.   Waist 43", hip 52"   Musculoskeletal: Normal range of motion. She exhibits edema (1+ pitting half way up to the knees).   Lymphadenopathy:     She has no cervical adenopathy.   Neurological: She is alert and oriented to person, place, and time.   Skin: Skin is warm and dry. No rash noted.         Assessment:       1. Cardiovascular event risk, ASCVD 10-year risk 10.5%, 2015    2. Hypertension associated with diabetes    3. Familial hypercholesteremia, baseline     4. Abdominal obesity    5. History of atrial fibrillation, 2015    6. Excessive daytime sleepiness    7. Nocturia    8. Peripheral edema         Plan:       Leanna was seen today for consult.    Diagnoses and all orders for this visit:    Cardiovascular event risk, ASCVD 10-year risk 10.5%, 2015  -     Lipid panel; Future    Hypertension associated with diabetes    Familial hypercholesteremia, baseline   -     Lipid panel; Future    Abdominal obesity    History of atrial fibrillation, 2015  -     Holter monitor - 48 hour; Future    Excessive daytime sleepiness    Nocturia    Peripheral edema    - Instruction for Mediterranean diet and heart healthy exercise given.  - Consider sleep study and oral Urologic review, for now avoid drinking fluid within 4 hours of sleep  - Highly recommend 30 minutes of exercise daily, can have Sunday off, with 2-3 sessions of muscle strengthening weekly. A  would be very helpful.  - Check home blood pressure, 2 " days weekly, do 2 readings within 5 minutes in AM and PM, keep log for review.  - Weigh twice weekly, try to lose 1-2 lbs per week  - Recommend at least annual cardiovascular evaluation in view of her significant risk factors.    Patient Active Problem List   Diagnosis    Hyperlipidemia associated with type 2 diabetes mellitus    Uncontrolled type 2 diabetes mellitus with stage 3 chronic kidney disease, without long-term current use of insulin    Chronic left shoulder pain    Hypertension associated with diabetes    History of atrial fibrillation, 2015    CKD (chronic kidney disease) stage 3, GFR 30-59 ml/min    Cardiovascular event risk, ASCVD 10-year risk 10.5%, 2015    Hypertensive left ventricular hypertrophy, without heart failure    MANJINDER (generalized anxiety disorder)    Obesity (BMI 30.0-34.9)    Inguinal hernia    Iron deficiency anemia secondary to inadequate dietary iron intake    Familial hypercholesteremia, baseline     Abdominal obesity    Excessive daytime sleepiness    Nocturia    Peripheral edema     Total face-to-face time with the patient was 50 minutes and greater than 50% was spent in counseling and coordination of care. The above assessment and plan have been discussed at length. Referring physician's note reviewed. Labs and procedure over the last 6 months reviewed. Problem List updated. Asked to bring in all active medications / pills bottles with next visit.

## 2017-04-10 NOTE — PATIENT INSTRUCTIONS
Recommended Mediterranean dietEating Heart-Healthy Food: Using the DASH Plan  Eating for your heart doesnt have to be hard or boring. You just need to know how to make healthier choices. The DASH eating plan has been developed to help you do just that. DASH stands for Dietary Approaches to Stop Hypertension. It is a plan that has been proven to be healthier for your heart and to lower your risk for high blood pressure. It can also help lower your risk for cancer, heart disease, osteoporosis, and diabetes.  Choosing from Each Food Group  Choose foods from each of the food groups below each day. Try to get the recommended number of servings for each food group. The serving numbers are based on a diet of 2,000 calories a day. Talk to your doctor if youre unsure about your calorie needs.  Grains   Servings: 7-8 a day  A serving is:  · 1 slice bread  · 1 ounce dry cereal  · half a cup cooked rice or pasta  Best choices: Whole grains and any grains high in fiber.  Vegetables   Servings: 4-5 a day  A serving is:  · 1 cup raw leafy vegetable  · Half a cup cooked vegetable  · Three-quarter cup vegetable juice  Best choices: Fresh or frozen vegetable prepared without too much added salt or fat.    Fruits   Servings: 4-5 a day  A serving is:  · Three-quarter cup fruit juice  · 1 medium fruit  · One-quarter cup dried fruit  · One-half cup fresh, frozen, or canned fruit  Best choices: A variety of fresh fruits of different colors. Whole fruits are a much better choice than fruit juices.  Low-fat or Fat Free Dairy   Servings: 2-3 a day  A serving is:  · 8 ounces milk  · 1 cup yogurt  · One and a half ounces cheese  Best choices: Skim or 1% milk, low-fat or fat free yogurt or buttermilk, and low-fat cheeses.       Meat, Poultry, Fish   Servings: 2 or fewer a day  A serving is:  · 3 ounces cooked meat, poultry, or fish  Best choices: Lean meats and fish. Trim away visible fat. Broil, roast, or boil instead of frying. Remove skin  from poultry before eating.  Nuts, Seeds, Beans   Servings: 4-5 a week  A serving is:  · One third cup nuts (or one and a half ounces)  · 2 tablespoons sunflower seeds  · Half a cup cooked beans  Best choices: Dry roasted nuts with no salt added, lentils, kidney beans, garbanzo beans, and whole stuart beans.    Fats and Oils   Servings: 2 a day  A serving is:  · 1 teaspoon vegetable oil  · 1 teaspoon soft margarine  · 1 tablespoon low-fat mayonnaise  · 1 teaspoon regular mayonnaise  · 2 tablespoons light salad dressing  · 1 tablespoon regular salad dressing  Best choices: Monounsaturated and polyunsaturated fats such as olive, canola, or safflower oil.  Sweets   Servings: 5 a week or fewer  A serving is:  · 1 tablespoon sugar, maple syrup, or honey  · 1 tablespoon jam or jelly  · 1 half-ounce jelly beans (about 15)  · 8 ounces lemonade  Best choices: Dried fruit can be a satisfying sweet. Choose low-fat sweets when possible. And watch your serving sizes!       Aerobic Exercise for a Healthy Heart  Exercise is a lot more than an energy booster and a stress reliever. It also strengthens your heart muscle, lowers your blood pressure and blood cholesterol, and burns calories.      Remember, some activity is better than none.     Choose an Aerobic Activity  Choose a nonstop activity that makes your heart and lungs work harder than they do when you rest or walk normally. This aerobic exercise can improve the way your heart and other muscles use oxygen. Make it fun by exercising with a friend and choosing an activity you enjoy. Here are some ideas:  · Walking  · Swimming  · Bicycling  · Stair climbing  · Dancing  · Jogging  Exercise Regularly  If you havent been exercising regularly,  get your doctors okay first. Then start slowly.  Here are some tips:  · Begin exercising 3 times a week for 5-10 minutes at a time.  · When you feel comfortable, add a few minutes each week.  · Slowly build up to exercising 3-4 times each  week for 20-40 minutes. Aim for a total of 150 or more minutes a week.  · Be sure to carry your nitroglycerin with you when you exercise.  · If you get angina when youre exercising, stop what youre doing, take your nitroglycerin, and call your doctor.  © 4456-1427 [employer], [address] [place] [address], [place] [address]. All rights reserved. This information is not intended as a substitute for professional medical care. Always follow your healthcare professional's instructions.  Losing Weight (Cardiovascular)  Excess weight is a major risk factor for heart disease. Losing weight may help keep your arteries open so that your heart can get the oxygen-rich blood it needs. Weight loss can also help lower your blood pressure and reduce your risk for diabetes. All in all, losing weight makes you healthier.          Exercise with a friend. When activity is fun, you're more likely to stick with it.        Calories and Weight Loss  Calories are the fuel your body burns for energy. You get the calories you need from the food you eat. For healthy weight loss, women should eat at least 1,200 calories a day, men at least 1,500.    When you eat more calories than you need, your body stores the extra calories as fat. One pound of fat equals 3,500 calories.    To lose weight, try to burn 500 calories a day more than you eat. To do this, eat 250 calories less each day. Add activity to burn the other 250 calories. Walking 21/2 miles burns about 250 calories.    Eat a variety of healthy foods. Its the best way to make calories count.     Tips for losing weight:  Drink 8 to 10 glasses of water a day.    Dont skip meals. Instead, eat smaller portions.       Brisk Activity Is Best  Brisk activity gets your heart pumping faster. It makes your heart healthier. Its also the best way to burn calories. In fact, your body may keep burning calories for hours after you stop a brisk activity.    Begin by walking 10 minutes most  days.    Add more time and speed to your walk. Build up as you feel able.    Try to walk briskly at least 30 minutes most days. If needed, you can break this into 2 shorter sessions.     Check off the ideas below that you could try to make your day more active:    Take the stairs instead of the elevator.    Park your car farther away and walk.    Ride a bike to work or to the store.    Walk laps around the mall.

## 2017-04-22 ENCOUNTER — LAB VISIT (OUTPATIENT)
Dept: LAB | Facility: HOSPITAL | Age: 52
End: 2017-04-22
Attending: FAMILY MEDICINE
Payer: MEDICARE

## 2017-04-22 DIAGNOSIS — E11.22 TYPE 2 DIABETES MELLITUS WITH END-STAGE RENAL DISEASE: ICD-10-CM

## 2017-04-22 DIAGNOSIS — I12.9 UNSPECIFIED HYPERTENSIVE KIDNEY DISEASE WITH CHRONIC KIDNEY DISEASE STAGE I THROUGH STAGE IV, OR UNSPECIFIED(403.90): ICD-10-CM

## 2017-04-22 DIAGNOSIS — R94.4 NONSPECIFIC ABNORMAL RESULTS OF KIDNEY FUNCTION STUDY: ICD-10-CM

## 2017-04-22 DIAGNOSIS — N18.30 CHRONIC KIDNEY DISEASE, STAGE III (MODERATE): ICD-10-CM

## 2017-04-22 DIAGNOSIS — N18.6 TYPE 2 DIABETES MELLITUS WITH END-STAGE RENAL DISEASE: ICD-10-CM

## 2017-04-22 LAB
ALBUMIN SERPL BCP-MCNC: 3.4 G/DL
ALP SERPL-CCNC: 68 U/L
ALT SERPL W/O P-5'-P-CCNC: 11 U/L
ANION GAP SERPL CALC-SCNC: 7 MMOL/L
AST SERPL-CCNC: 17 U/L
BASOPHILS # BLD AUTO: 0.03 K/UL
BASOPHILS NFR BLD: 0.4 %
BILIRUB SERPL-MCNC: 0.3 MG/DL
BUN SERPL-MCNC: 33 MG/DL
C3 SERPL-MCNC: 161 MG/DL
C4 SERPL-MCNC: 26 MG/DL
CALCIUM SERPL-MCNC: 10 MG/DL
CHLORIDE SERPL-SCNC: 103 MMOL/L
CO2 SERPL-SCNC: 31 MMOL/L
CREAT SERPL-MCNC: 1.5 MG/DL
CREAT UR-MCNC: 64 MG/DL
DIFFERENTIAL METHOD: ABNORMAL
EOSINOPHIL # BLD AUTO: 0.2 K/UL
EOSINOPHIL NFR BLD: 1.9 %
ERYTHROCYTE [DISTWIDTH] IN BLOOD BY AUTOMATED COUNT: 13.2 %
EST. GFR  (AFRICAN AMERICAN): 46.2 ML/MIN/1.73 M^2
EST. GFR  (NON AFRICAN AMERICAN): 40.1 ML/MIN/1.73 M^2
GLUCOSE SERPL-MCNC: 129 MG/DL
HCT VFR BLD AUTO: 33.7 %
HGB BLD-MCNC: 11 G/DL
LYMPHOCYTES # BLD AUTO: 2.1 K/UL
LYMPHOCYTES NFR BLD: 25.2 %
MCH RBC QN AUTO: 26.9 PG
MCHC RBC AUTO-ENTMCNC: 32.6 %
MCV RBC AUTO: 82 FL
MONOCYTES # BLD AUTO: 0.7 K/UL
MONOCYTES NFR BLD: 8.3 %
NEUTROPHILS # BLD AUTO: 5.3 K/UL
NEUTROPHILS NFR BLD: 64 %
PHOSPHATE SERPL-MCNC: 4.8 MG/DL
PLATELET # BLD AUTO: 263 K/UL
PMV BLD AUTO: 10.7 FL
POTASSIUM SERPL-SCNC: 4.5 MMOL/L
PROT 24H UR-MRATE: ABNORMAL MG/SPEC
PROT SERPL-MCNC: 7.2 G/DL
PROT UR-MCNC: 448 MG/DL
PROT UR-MCNC: 452 MG/DL
PROT/CREAT RATIO, UR: 7.06
PTH-INTACT SERPL-MCNC: 30 PG/ML
RBC # BLD AUTO: 4.09 M/UL
SODIUM SERPL-SCNC: 141 MMOL/L
URINE COLLECTION DURATION: ABNORMAL HR
URINE VOLUME: ABNORMAL ML
WBC # BLD AUTO: 8.23 K/UL

## 2017-04-22 PROCEDURE — 84165 PROTEIN E-PHORESIS SERUM: CPT | Mod: 26,,, | Performed by: PATHOLOGY

## 2017-04-22 PROCEDURE — 84166 PROTEIN E-PHORESIS/URINE/CSF: CPT

## 2017-04-22 PROCEDURE — 84165 PROTEIN E-PHORESIS SERUM: CPT

## 2017-04-22 PROCEDURE — 85025 COMPLETE CBC W/AUTO DIFF WBC: CPT

## 2017-04-22 PROCEDURE — 36415 COLL VENOUS BLD VENIPUNCTURE: CPT | Mod: PO

## 2017-04-22 PROCEDURE — 84156 ASSAY OF PROTEIN URINE: CPT | Mod: 91

## 2017-04-22 PROCEDURE — 84166 PROTEIN E-PHORESIS/URINE/CSF: CPT | Mod: 26,,, | Performed by: PATHOLOGY

## 2017-04-22 PROCEDURE — 83970 ASSAY OF PARATHORMONE: CPT

## 2017-04-22 PROCEDURE — 86160 COMPLEMENT ANTIGEN: CPT

## 2017-04-22 PROCEDURE — 86038 ANTINUCLEAR ANTIBODIES: CPT

## 2017-04-22 PROCEDURE — 84100 ASSAY OF PHOSPHORUS: CPT

## 2017-04-22 PROCEDURE — 86160 COMPLEMENT ANTIGEN: CPT | Mod: 59

## 2017-04-22 PROCEDURE — 84156 ASSAY OF PROTEIN URINE: CPT

## 2017-04-22 PROCEDURE — 80053 COMPREHEN METABOLIC PANEL: CPT

## 2017-04-24 LAB
ALBUMIN SERPL ELPH-MCNC: 3.54 G/DL
ALPHA1 GLOB SERPL ELPH-MCNC: 0.36 G/DL
ALPHA2 GLOB SERPL ELPH-MCNC: 0.86 G/DL
ANA SER QL IF: NORMAL
B-GLOBULIN SERPL ELPH-MCNC: 0.89 G/DL
GAMMA GLOB SERPL ELPH-MCNC: 0.85 G/DL
PATHOLOGIST INTERPRETATION SPE: NORMAL
PATHOLOGIST INTERPRETATION UPE: NORMAL
PROT PATTERN UR ELPH-IMP: NORMAL
PROT SERPL-MCNC: 6.5 G/DL

## 2017-05-02 ENCOUNTER — OFFICE VISIT (OUTPATIENT)
Dept: FAMILY MEDICINE | Facility: CLINIC | Age: 52
End: 2017-05-02
Payer: MEDICARE

## 2017-05-02 ENCOUNTER — HOSPITAL ENCOUNTER (EMERGENCY)
Facility: HOSPITAL | Age: 52
Discharge: HOME | End: 2017-05-02
Attending: EMERGENCY MEDICINE
Payer: MEDICARE

## 2017-05-02 ENCOUNTER — TELEPHONE (OUTPATIENT)
Dept: FAMILY MEDICINE | Facility: CLINIC | Age: 52
End: 2017-05-02

## 2017-05-02 ENCOUNTER — DOCUMENTATION ONLY (OUTPATIENT)
Dept: FAMILY MEDICINE | Facility: CLINIC | Age: 52
End: 2017-05-02

## 2017-05-02 VITALS
HEIGHT: 68 IN | WEIGHT: 194 LBS | OXYGEN SATURATION: 99 % | RESPIRATION RATE: 18 BRPM | TEMPERATURE: 98 F | DIASTOLIC BLOOD PRESSURE: 67 MMHG | HEART RATE: 60 BPM | SYSTOLIC BLOOD PRESSURE: 150 MMHG | BODY MASS INDEX: 29.4 KG/M2

## 2017-05-02 VITALS
WEIGHT: 196.19 LBS | HEIGHT: 68 IN | SYSTOLIC BLOOD PRESSURE: 190 MMHG | BODY MASS INDEX: 29.73 KG/M2 | HEART RATE: 76 BPM | TEMPERATURE: 98 F | DIASTOLIC BLOOD PRESSURE: 92 MMHG

## 2017-05-02 DIAGNOSIS — R10.10 PAIN OF UPPER ABDOMEN: Primary | ICD-10-CM

## 2017-05-02 DIAGNOSIS — I10 ACCELERATED HYPERTENSION: Primary | ICD-10-CM

## 2017-05-02 DIAGNOSIS — R51.9 HEADACHE: ICD-10-CM

## 2017-05-02 DIAGNOSIS — G43.909 MIGRAINE WITHOUT STATUS MIGRAINOSUS, NOT INTRACTABLE, UNSPECIFIED MIGRAINE TYPE: ICD-10-CM

## 2017-05-02 PROCEDURE — 99999 PR PBB SHADOW E&M-EST. PATIENT-LVL V: CPT | Mod: PBBFAC,,, | Performed by: PHYSICIAN ASSISTANT

## 2017-05-02 PROCEDURE — 96375 TX/PRO/DX INJ NEW DRUG ADDON: CPT

## 2017-05-02 PROCEDURE — 25000003 PHARM REV CODE 250: Performed by: EMERGENCY MEDICINE

## 2017-05-02 PROCEDURE — 99284 EMERGENCY DEPT VISIT MOD MDM: CPT | Mod: 25

## 2017-05-02 PROCEDURE — 99214 OFFICE O/P EST MOD 30 MIN: CPT | Mod: S$GLB,,, | Performed by: PHYSICIAN ASSISTANT

## 2017-05-02 PROCEDURE — 3078F DIAST BP <80 MM HG: CPT | Mod: S$GLB,,, | Performed by: PHYSICIAN ASSISTANT

## 2017-05-02 PROCEDURE — 3077F SYST BP >= 140 MM HG: CPT | Mod: S$GLB,,, | Performed by: PHYSICIAN ASSISTANT

## 2017-05-02 PROCEDURE — 1160F RVW MEDS BY RX/DR IN RCRD: CPT | Mod: S$GLB,,, | Performed by: PHYSICIAN ASSISTANT

## 2017-05-02 PROCEDURE — 63600175 PHARM REV CODE 636 W HCPCS: Performed by: EMERGENCY MEDICINE

## 2017-05-02 PROCEDURE — 96374 THER/PROPH/DIAG INJ IV PUSH: CPT

## 2017-05-02 RX ORDER — HALOPERIDOL 5 MG/ML
5 INJECTION INTRAMUSCULAR
Status: COMPLETED | OUTPATIENT
Start: 2017-05-02 | End: 2017-05-02

## 2017-05-02 RX ORDER — LABETALOL HYDROCHLORIDE 5 MG/ML
20 INJECTION, SOLUTION INTRAVENOUS
Status: COMPLETED | OUTPATIENT
Start: 2017-05-02 | End: 2017-05-02

## 2017-05-02 RX ORDER — BUTORPHANOL TARTRATE 1 MG/ML
1 INJECTION INTRAMUSCULAR; INTRAVENOUS
Status: COMPLETED | OUTPATIENT
Start: 2017-05-02 | End: 2017-05-02

## 2017-05-02 RX ORDER — PROMETHAZINE HYDROCHLORIDE 25 MG/1
25 SUPPOSITORY RECTAL EVERY 6 HOURS PRN
Qty: 10 SUPPOSITORY | Refills: 0 | Status: SHIPPED | OUTPATIENT
Start: 2017-05-02 | End: 2018-01-09

## 2017-05-02 RX ADMIN — LABETALOL HYDROCHLORIDE 20 MG: 5 INJECTION INTRAVENOUS at 02:05

## 2017-05-02 RX ADMIN — BUTORPHANOL TARTRATE 1 MG: 1 INJECTION, SOLUTION INTRAMUSCULAR; INTRAVENOUS at 02:05

## 2017-05-02 RX ADMIN — HALOPERIDOL LACTATE 5 MG: 5 INJECTION, SOLUTION INTRAMUSCULAR at 02:05

## 2017-05-02 NOTE — TELEPHONE ENCOUNTER
Phone message for patient to keep today's scheduled appointment unless pain intolerable then go to ER for evaluation.

## 2017-05-02 NOTE — PROGRESS NOTES
Pre-Visit Chart Review  For Appointment Scheduled on 05/02/2017      Health Maintenance Due   Topic Date Due    Eye Exam  08/12/1975    TETANUS VACCINE  08/12/1983    Pneumococcal PPSV23 (Medium Risk) (1) 08/12/1983    Mammogram  03/24/2017    Hemoglobin A1c  05/04/2017

## 2017-05-02 NOTE — ED PROVIDER NOTES
Encounter Date: 5/2/2017    SCRIBE #1 NOTE: IRosalie, am scribing for, and in the presence of, Dr. Aj.       History     Chief Complaint   Patient presents with    Headache     Pt states that she has a migraine headcache     Review of patient's allergies indicates:   Allergen Reactions    Dilaudid [hydromorphone (bulk)] Nausea And Vomiting     Severe GI upset    Lortab [hydrocodone-acetaminophen] Itching     HPI Comments:   05/02/2017 1:48 AM     Chief Complaint: Headache    The patient is a 51 y.o. female who presents to the ED with complaint of migraine headache. States that the symptoms have been present intermittently for a few months. This headache started yesterday and has been constant since onset. Reports associated nausea and vomiting. She has taken Excedrin migraine without alleviation.  Last took her antihypertensive yesterday, as she was unable to take it today due to nausea. She denies fever, neck stiffness, vision changes, slurred speech, or any other symptoms at this time. History of HTN, HLD, DM, A-fib, arthritis. SHx including: hysterectomy, colonoscopy, hernia repair. Known allergies to Dilaudid and Lortab.    The history is provided by the patient.     Past Medical History:   Diagnosis Date    A-fib     resolved per patient    Arthritis     Bronchitis     Cardiovascular event risk, ASCVD 10-year risk 6.7% 10/15/2016    Diabetes mellitus     Diabetes mellitus type II     Encounter for blood transfusion     History of colon polyps 11/2/2016    Hyperlipidemia     Hypertension      Past Surgical History:   Procedure Laterality Date    COLONOSCOPY N/A 10/5/2016    Procedure: COLONOSCOPY;  Surgeon: Pillo Chanel MD;  Location[facility];  Service: Endoscopy;  Laterality: N/A;    HERNIA REPAIR Bilateral 11/22/2016    inguinal    HYSTERECTOMY       Family History   Problem Relation Age of Onset    Hyperlipidemia Mother     Hypertension Mother     Hypertension Father      "Diabetes Father     Diabetes Sister     Diabetes Sister     Diabetes Maternal Aunt     Diabetes Maternal Grandmother     Heart disease Maternal Grandmother     Cancer Paternal Grandmother      Social History   Substance Use Topics    Smoking status: Never Smoker    Smokeless tobacco: Never Used    Alcohol use No     Review of Systems   Constitutional: Negative for activity change, appetite change, chills, fatigue and fever.   Eyes: Negative for visual disturbance.   Respiratory: Negative for apnea and shortness of breath.    Cardiovascular: Negative for chest pain and palpitations.   Gastrointestinal: Positive for nausea and vomiting. Negative for abdominal distention and abdominal pain.   Genitourinary: Negative for difficulty urinating.   Musculoskeletal: Negative for neck pain and neck stiffness.   Skin: Negative for pallor and rash.   Neurological: Positive for headaches. Negative for speech difficulty.   Hematological: Does not bruise/bleed easily.   Psychiatric/Behavioral: Negative for agitation.       Physical Exam   Initial Vitals   BP Pulse Resp Temp SpO2   -- 05/02/17 0144 05/02/17 0144 05/02/17 0144 05/02/17 0144    86 18 97.7 °F (36.5 °C) 98 %   BP (!) 229/98  Pulse 86  Temp 97.7 °F (36.5 °C)  Resp 18  Ht 5' 8" (1.727 m)  Wt 88 kg (194 lb)  SpO2 98%  BMI 29.5 kg/m2    Physical Exam    Nursing note and vitals reviewed.  Constitutional: She appears well-developed and well-nourished.   HENT:   Head: Normocephalic and atraumatic.   Eyes: Conjunctivae are normal. Pupils are equal, round, and reactive to light.   Neck: Normal range of motion. Neck supple.   Cardiovascular: Normal rate and regular rhythm. Exam reveals no gallop and no friction rub.    No murmur heard.  Pulmonary/Chest: Effort normal and breath sounds normal. No respiratory distress. She has no wheezes. She has no rhonchi. She has no rales.   Abdominal: Soft. She exhibits no distension. There is no tenderness.   Musculoskeletal: " Normal range of motion.   Neurological: She is alert and oriented to person, place, and time. She has normal strength.   CN II-XII intact   Skin: Skin is warm and dry. No erythema.   Psychiatric: She has a normal mood and affect.         ED Course   Procedures  Labs Reviewed - No data to display          Medical Decision Making:   History:   Old Medical Records: I decided to obtain old medical records.  Clinical Tests:   Radiological Study: Ordered and Reviewed  Leanna García is a 51 y.o. female who presents with  a bilateral frontal headache and is found have a blood pressure of 2/25/98.  He most likely etiology is a hypertensive encephalopathy.  CT of the head fails to demonstrate any evidence of intracranial hemorrhage.  She has symptomatic improvement with controlled blood pressure.  She is encouraged to strictly adhere to her antihypertensive regimen and is given Phenergan suppositories for nausea and vomiting.  There is no fever or neck stiffness to suggest infectious process.            Scribe Attestation:   Scribe #1: I performed the above scribed service and the documentation accurately describes the services I performed. I attest to the accuracy of the note.    Attending Attestation:           Physician Attestation for Scribe:  Physician Attestation Statement for Scribe #1: I, Dr. Aj, reviewed documentation, as scribed by Rosalie Travis in my presence, and it is both accurate and complete.                 ED Course     Clinical Impression:   The primary encounter diagnosis was Accelerated hypertension. Diagnoses of Headache and Migraine without status migrainosus, not intractable, unspecified migraine type were also pertinent to this visit.          Addy Aj III, MD  05/02/17 0253

## 2017-05-02 NOTE — TELEPHONE ENCOUNTER
----- Message from Astrid Sanchez sent at 5/2/2017  8:59 AM CDT -----  Please call pt at [phone]  Asking to be seen today ... Next available with Ms Issa at 5:20 ... She states she is having  terrible stomach pains / not sure what to do ... Please call to discuss

## 2017-05-02 NOTE — PROGRESS NOTES
Subjective:       Patient ID: Leanna García is a 51 y.o. female.    Chief Complaint: Headache    HPI Comments: Patient is here for abdominal pain, headache anf accelerated blood pressure.  She states she had a severe migraine yesterday that was causing vomiting and she was unable to take and keep down any of her BP meds.  Her BP was 229/98 in the ER this morning.  She had a CT of the head which was negative.  She was given phenergan and labetalol and felt better.  She was discharged this AM.  She presents here tonight with continued abdominal pain and high blood pressure.  She has taken her BP meds since she was discharged but the pressure has not come down.  She still has a headache but is not vomiting.     Review of Systems   Constitutional: Negative for activity change, appetite change, fatigue, fever and unexpected weight change.   HENT: Negative for congestion, dental problem, hearing loss, postnasal drip, rhinorrhea, sinus pressure and trouble swallowing.    Eyes: Negative for photophobia, discharge and visual disturbance.   Respiratory: Negative for cough, chest tightness, shortness of breath and wheezing.    Cardiovascular: Negative for chest pain, palpitations and leg swelling.   Gastrointestinal: Negative for abdominal pain, blood in stool, constipation, diarrhea, nausea and vomiting.   Genitourinary: Negative for difficulty urinating, dyspareunia, dysuria, hematuria, menstrual problem, pelvic pain, vaginal bleeding, vaginal discharge and vaginal pain.   Musculoskeletal: Negative for arthralgias, back pain, joint swelling and neck pain.   Skin: Negative for color change and rash.   Neurological: Negative for dizziness, seizures, weakness, light-headedness, numbness and headaches.   Hematological: Does not bruise/bleed easily.   Psychiatric/Behavioral: Negative for sleep disturbance and suicidal ideas. The patient is not nervous/anxious.        Objective:      Physical Exam   Constitutional: She is oriented  to person, place, and time. She appears well-developed and well-nourished. No distress.   HENT:   Head: Normocephalic and atraumatic.   Mouth/Throat: Uvula is midline, oropharynx is clear and moist and mucous membranes are normal. No oropharyngeal exudate, posterior oropharyngeal edema, posterior oropharyngeal erythema or tonsillar abscesses.   Eyes: Conjunctivae and EOM are normal. Pupils are equal, round, and reactive to light.   Neck: Normal range of motion. Neck supple. Carotid bruit is not present. No thyromegaly present.   Cardiovascular: Normal rate and regular rhythm.  Exam reveals no gallop and no friction rub.    No murmur heard.  Pulmonary/Chest: Effort normal and breath sounds normal. No stridor. No respiratory distress. She has no wheezes. She has no rales.   Abdominal: Soft. Bowel sounds are normal. She exhibits no mass. There is tenderness in the right upper quadrant and epigastric area.   Musculoskeletal: Normal range of motion. She exhibits no edema or tenderness.   Lymphadenopathy:     She has no cervical adenopathy.   Neurological: She is alert and oriented to person, place, and time. She has normal reflexes.   Skin: Skin is warm and dry.   Psychiatric: She has a normal mood and affect. Her behavior is normal. Judgment and thought content normal.   Nursing note and vitals reviewed.      Assessment:       1. Pain of upper abdomen        Plan:       Leanna was seen today for headache.    Diagnoses and all orders for this visit:    Pain of upper abdomen  -     CT Abdomen Pelvis With Contrast; Future  -     CBC auto differential; Future  -     Comprehensive metabolic panel; Future  -     Amylase; Future  -     Lipase; Future     Patient and  did not want to schedule CT for tomorrow.  Patient had just eaten tonight and he states he can not take off any more work to bring her tomorrow to get CT.  She will return to the ER if symptoms worsen.  They will get blood work tonight.

## 2017-05-02 NOTE — ED AVS SNAPSHOT
[facility] [address]  [place] [address]               Leanna García   2017  1:47 AM   ED    Description:  Female : 1965   Department:  [facility]           Your Care was Coordinated By:     Provider Role From To    Addy Aj III, MD Attending Provider 17 0150 --      Reason for Visit     Headache           Diagnoses this Visit        Comments    Accelerated hypertension    -  Primary     Headache         Migraine without status migrainosus, not intractable, unspecified migraine type           ED Disposition     None           To Do List           Follow-up Information     Follow up with Nolberto Lomax MD In 2 days.    Specialty:  Family Medicine    Contact information:    [address]  [facility] [address]  [phone]         These Medications        Disp Refills Start End    promethazine (PHENERGAN) 25 MG suppository 10 suppository 0 2017     Place 1 suppository (25 mg total) rectally every 6 (six) hours as needed for Nausea. - Rectal      [facility]     [facility] Care Line - 24/ Assistance  Unless otherwise directed by your provider, please contact [facility], our nurse care line that is available for  assistance.     Registered nurses in the [facility] provide: appointment scheduling, clinical advisement, health education, and other advisory services.  Call: [phone] (toll free)               Medications           Message regarding Medications     Verify the changes and/or additions to your medication regime listed below are the same as discussed with your clinician today.  If any of these changes or additions are incorrect, please notify your healthcare provider.        START taking these NEW medications        Refills    promethazine (PHENERGAN) 25 MG suppository 0    Sig: Place 1 suppository (25 mg total) rectally every 6 (six) hours as needed for Nausea.     Class: Print    Route: Rectal      These medications were administered today        Dose Freq    butorphanol injection 1 mg 1 mg ED 1 Time    Sig: Inject 1 mL (1 mg total) into the vein ED 1 Time.    Class: Normal    Route: Intravenous    haloperidol lactate injection 5 mg 5 mg ED 1 Time    Sig: Inject 1 mL (5 mg total) into the vein ED 1 Time.    Class: Normal    Route: Intravenous    labetalol injection 20 mg 20 mg ED 1 Time    Sig: Inject 4 mLs (20 mg total) into the vein ED 1 Time.    Class: Normal    Route: Intravenous      STOP taking these medications     ondansetron (ZOFRAN-ODT) 4 MG TbDL Take 1 tablet (4 mg total) by mouth every 6 (six) hours as needed.    albuterol 90 mcg/actuation inhaler Inhale 2 puffs into the lungs 4 (four) times daily.           Verify that the below list of medications is an accurate representation of the medications you are currently taking.  If none reported, the list may be blank. If incorrect, please contact your healthcare provider. Carry this list with you in case of emergency.           Current Medications     albuterol (PROVENTIL) 2.5 mg /3 mL (0.083 %) nebulizer solution Take 2.5 mg by nebulization 2 (two) times daily as needed for Wheezing.    aspirin (ECOTRIN) 81 MG EC tablet Take 81 mg by mouth once daily.    atorvastatin (LIPITOR) 80 MG tablet Take 1 tablet (80 mg total) by mouth once daily.    blood sugar diagnostic Strp Inject 1 strip into the skin 3 (three) times daily. TrueResult Glucometer Strips.  Testing Daily.    butalbital-acetaminop-caf-cod -79-30 mg Cap Take 1 tablet by mouth every 12 (twelve) hours as needed.    ergocalciferol (ERGOCALCIFEROL) 50,000 unit Cap Take 1 capsule (50,000 Units total) by mouth every 7 days.    escitalopram oxalate (LEXAPRO) 10 MG tablet Take 1 tablet (10 mg total) by mouth every evening.    fenofibrate 160 MG Tab Take 1 tablet (160 mg total) by mouth once daily.    fish oil-omega-3 fatty acids 300-1,000 mg capsule Take 2 g by  "mouth once daily. Stop taking until after surgery.    furosemide (LASIX) 20 MG tablet Take 1 tablet (20 mg total) by mouth once daily.    glipiZIDE (GLUCOTROL) 10 MG tablet Take 1 tablet (10 mg total) by mouth 2 (two) times daily before meals.    hydrochlorothiazide (HYDRODIURIL) 12.5 MG Tab Take 1 tablet (12.5 mg total) by mouth once daily.    lancets Misc Use to test blood sugar daily    True Results  DX:E11.9    losartan (COZAAR) 100 MG tablet Take 1 tablet (100 mg total) by mouth once daily.    metformin (GLUCOPHAGE) 500 MG tablet Take 2 tablets (1,000 mg total) by mouth 2 (two) times daily with meals.    metoprolol tartrate (LOPRESSOR) 25 MG tablet Take 1 tablet (25 mg total) by mouth 2 (two) times daily.    nifedipine (PROCARDIA-XL) 60 MG (OSM) 24 hr tablet Take 1 tablet (60 mg total) by mouth once daily.    polyethylene glycol (GLYCOLAX) 17 gram/dose powder Take 17 g by mouth once daily. 2 x daily for 2 weeks then once daily for 4 weeks, hold if loose bm and decrease to every other day    promethazine (PHENERGAN) 25 MG suppository Place 1 suppository (25 mg total) rectally every 6 (six) hours as needed for Nausea.           Clinical Reference Information           Your Vitals Were     BP Pulse Temp Resp Height Weight    150/67 60 97.7 °F (36.5 °C) 18 5' 8" (1.727 m) 88 kg (194 lb)    SpO2 BMI             99% 29.5 kg/m2         Allergies as of 5/2/2017        Reactions    Dilaudid [Hydromorphone (Bulk)] Nausea And Vomiting    Severe GI upset    Lortab [Hydrocodone-acetaminophen] Itching      Immunizations Administered on Date of Encounter - 5/2/2017     None      ED Micro, Lab, POCT     None      ED Imaging Orders     Start Ordered       Status Ordering Provider    05/02/17 0157 05/02/17 0157  CT Head Without Contrast  1 time imaging      In process       Discharge References/Attachments     HEADACHE, UNSPECIFIED (ENGLISH)    HIGH BLOOD PRESSURE, ESTABLISHED, OUT OF CONTROL (ENGLISH)      Your Scheduled " Appointments     May 17, 2017 10:30 AM CDT   Fasting Lab with [facility] ([facility])    [address]  [place] [address] [phone]            May 17, 2017 11:20 AM CDT   Established Patient Visit with MD [place] [facility] ([facility] [place])    [address]  [place] [id number] [phone]               [facility] complies with applicable Federal civil rights laws and does not discriminate on the basis of race, color, national origin, age, disability, or sex.        Language Assistance Services     ATTENTION: Language assistance services are available, free of charge. Please call [phone].      ATENCIÓN: Si habla español, tiene a pretty disposición servicios gratuitos de asistencia lingüística. Llame al [phone].     CHÚ Ý: N?u b?n nói Ti?ng Vi?t, có các d?ch v? h? tr? ngôn ng? mi?n phí dành cho b?n. G?i s? [phone].

## 2017-05-03 ENCOUNTER — HOSPITAL ENCOUNTER (OUTPATIENT)
Dept: RADIOLOGY | Facility: HOSPITAL | Age: 52
Discharge: HOME | End: 2017-05-03
Attending: FAMILY MEDICINE
Payer: MEDICARE

## 2017-05-03 DIAGNOSIS — R10.10 PAIN OF UPPER ABDOMEN: ICD-10-CM

## 2017-05-03 PROCEDURE — 74176 CT ABD & PELVIS W/O CONTRAST: CPT | Mod: 26,,, | Performed by: RADIOLOGY

## 2017-05-03 PROCEDURE — 74176 CT ABD & PELVIS W/O CONTRAST: CPT | Mod: TC

## 2017-05-05 DIAGNOSIS — E55.9 VITAMIN D DEFICIENCY: ICD-10-CM

## 2017-05-05 RX ORDER — ERGOCALCIFEROL 1.25 MG/1
CAPSULE ORAL
Qty: 12 CAPSULE | Refills: 0 | Status: SHIPPED | OUTPATIENT
Start: 2017-05-05 | End: 2017-05-17

## 2017-05-08 ENCOUNTER — TELEPHONE (OUTPATIENT)
Dept: FAMILY MEDICINE | Facility: CLINIC | Age: 52
End: 2017-05-08

## 2017-05-08 DIAGNOSIS — R10.11 RUQ PAIN: Primary | ICD-10-CM

## 2017-05-08 NOTE — TELEPHONE ENCOUNTER
Patient has questions about results. Reviewed all lab/film/plan of care notes with her. She does want to do HIDA scan at [facility]. Will call her back after order/referral in system; she understands that she will call hospital to schedule and find out financials, and that surgeon consult may be needed following. Luis Manuel out of office, will route to PCP for review.

## 2017-05-08 NOTE — TELEPHONE ENCOUNTER
----- Message from [place] Cummings sent at 5/5/2017  4:19 PM CDT -----  Returning your call.  Please call patient at [phone].

## 2017-05-09 NOTE — TELEPHONE ENCOUNTER
----- Message from [place] Arthuran sent at 5/9/2017  3:59 PM CDT -----  Please call patient in reference to a CT scan.  Please call [phone].

## 2017-05-10 NOTE — TELEPHONE ENCOUNTER
Faxed CHEYANNE referral to [phone]. Patient notified. She will call for appointment and ask that results be sent.

## 2017-05-11 ENCOUNTER — TELEPHONE (OUTPATIENT)
Dept: FAMILY MEDICINE | Facility: CLINIC | Age: 52
End: 2017-05-11

## 2017-05-11 NOTE — TELEPHONE ENCOUNTER
Received fax from [facility] that the patient's HIDA scan needs to be approved before she can schedule. Notified authorization  Bill and they are getting it worked now.

## 2017-05-16 ENCOUNTER — DOCUMENTATION ONLY (OUTPATIENT)
Dept: FAMILY MEDICINE | Facility: CLINIC | Age: 52
End: 2017-05-16

## 2017-05-16 NOTE — PROGRESS NOTES
Pre-Visit Chart Review  For Appointment Scheduled on 05/17/2017    Health Maintenance Due   Topic Date Due    Eye Exam  08/12/1975    TETANUS VACCINE  08/12/1983    Pneumococcal PPSV23 (Medium Risk) (1) 08/12/1983    Hemoglobin A1c  05/04/2017    Mammogram  03/24/2017

## 2017-05-16 NOTE — PATIENT INSTRUCTIONS
Established High Blood Pressure    High blood pressure (hypertension) is a chronic disease. Often health care providers dont know what causes it. But it can be caused by certain health conditions and medicines.  If you have high blood pressure, you may not have any symptoms. If you do have symptoms, they may include headache, dizziness, changes in your vision, chest pain, and shortness of breath. But even without symptoms, high blood pressure thats not treated raises your risk for heart attack and stroke. High blood pressure is a serious health risk and shouldnt be ignored.  A blood pressure reading is made up of two numbers: a higher number over a lower number. The top number is the systolic pressure. The bottom number is the diastolic pressure. A normal blood pressure is less than 120 over less than 80.  High blood pressure is when either the top number is 140 or higher, or the bottom number is 90 or higher. This must be the result when taking your blood pressure a number of times. The blood pressures between normal and high are called prehypertension.  Home care  If you have high blood pressure, you should do what is listed below to lower your blood pressure. If you are taking medicines for high blood pressure, these methods may reduce or end your need for medicines in the future.  · Begin a weight-loss program if you are overweight.  · Cut back on how much salt you get in your diet. Heres how to do this:  ¨ Dont eat foods that have a lot of salt. These include olives, pickles, smoked meats, and salted potato chips.  ¨ Dont add salt to your food at the table.  ¨ Use only small amounts of salt when cooking.  · Begin an exercise program. Talk with your health care provider about the type of exercise program that would be best for you. It doesn't have to be hard. Even brisk walking for 20 minutes 3 times a week is a good form of exercise.  · Dont take medicines that have heart stimulants. This includes many  cold and sinus decongestant pills and sprays, as well as diet pills. Check the warnings about hypertension on the label. Stimulants such as amphetamine or cocaine could be lethal for someone with high blood pressure. Never take these.  · Limit how much caffeine you get in your diet. Switch to caffeine-free products.  · Stop smoking. If you are a long-time smoker, this can be hard. Enroll in a stop-smoking program to make it more likely that you will quit for good.  · Learn how to handle stress. This is an important part of any program to lower blood pressure. Learn about relaxation methods like meditation, yoga, or biofeedback.  · If your provider prescribed medicines, take them exactly as directed. Missing doses may cause your blood pressure get out of control.  · Consider buying an automatic blood pressure machine. You can get one of these at most pharmacies. Use this to watch your blood pressure at home. Give the results to your provider.  Follow-up care  You will need to make regular visits to your health care provider. This is to check your blood pressure and to make changes to your medicines. Make a follow-up appointment as directed.  When to seek medical advice  Call your health care provider right away if any of these occur:  · Chest pain or shortness of breath  · Severe headache  · Throbbing or rushing sound in the ears  · Nosebleed  · Sudden severe pain in your belly (abdomen)  · Extreme drowsiness, confusion, or fainting  · Dizziness or dizziness with a spinning sensation (vertigo)  · Weakness of an arm or leg or one side of the face  · You have problems speaking or seeing   Date Last Reviewed: 11/25/2014  © 8045-8159 [employer]. [address] [place] [address], [place] [address]. All rights reserved. This information is not intended as a substitute for professional medical care. Always follow your healthcare professional's instructions.

## 2017-05-17 ENCOUNTER — LAB VISIT (OUTPATIENT)
Dept: LAB | Facility: HOSPITAL | Age: 52
End: 2017-05-17
Attending: FAMILY MEDICINE
Payer: MEDICARE

## 2017-05-17 ENCOUNTER — OFFICE VISIT (OUTPATIENT)
Dept: FAMILY MEDICINE | Facility: CLINIC | Age: 52
End: 2017-05-17
Payer: MEDICARE

## 2017-05-17 VITALS
BODY MASS INDEX: 29.37 KG/M2 | SYSTOLIC BLOOD PRESSURE: 185 MMHG | HEIGHT: 68 IN | WEIGHT: 193.81 LBS | TEMPERATURE: 98 F | DIASTOLIC BLOOD PRESSURE: 92 MMHG | RESPIRATION RATE: 18 BRPM | HEART RATE: 77 BPM

## 2017-05-17 DIAGNOSIS — N18.30 CKD (CHRONIC KIDNEY DISEASE) STAGE 3, GFR 30-59 ML/MIN: ICD-10-CM

## 2017-05-17 DIAGNOSIS — E78.01 FAMILIAL HYPERCHOLESTEREMIA: ICD-10-CM

## 2017-05-17 DIAGNOSIS — I15.2 HYPERTENSION ASSOCIATED WITH DIABETES: Primary | Chronic | ICD-10-CM

## 2017-05-17 DIAGNOSIS — E11.69 HYPERLIPIDEMIA ASSOCIATED WITH TYPE 2 DIABETES MELLITUS: ICD-10-CM

## 2017-05-17 DIAGNOSIS — E11.8 TYPE 2 DIABETES MELLITUS WITH COMPLICATION, WITHOUT LONG-TERM CURRENT USE OF INSULIN: ICD-10-CM

## 2017-05-17 DIAGNOSIS — E78.5 HYPERLIPIDEMIA ASSOCIATED WITH TYPE 2 DIABETES MELLITUS: ICD-10-CM

## 2017-05-17 DIAGNOSIS — H61.23 BILATERAL IMPACTED CERUMEN: ICD-10-CM

## 2017-05-17 DIAGNOSIS — K83.8 BILIARY SLUDGE: ICD-10-CM

## 2017-05-17 DIAGNOSIS — Z91.89 CARDIOVASCULAR EVENT RISK: ICD-10-CM

## 2017-05-17 DIAGNOSIS — E55.9 VITAMIN D DEFICIENCY: ICD-10-CM

## 2017-05-17 DIAGNOSIS — F41.1 GAD (GENERALIZED ANXIETY DISORDER): ICD-10-CM

## 2017-05-17 DIAGNOSIS — E66.9 OBESITY (BMI 30.0-34.9): ICD-10-CM

## 2017-05-17 DIAGNOSIS — E11.59 HYPERTENSION ASSOCIATED WITH DIABETES: Primary | Chronic | ICD-10-CM

## 2017-05-17 LAB
25(OH)D3+25(OH)D2 SERPL-MCNC: 16 NG/ML
CHOLEST/HDLC SERPL: 5.5 {RATIO}
HDL/CHOLESTEROL RATIO: 18.3 %
HDLC SERPL-MCNC: 257 MG/DL
HDLC SERPL-MCNC: 47 MG/DL
LDLC SERPL CALC-MCNC: 170.2 MG/DL
NONHDLC SERPL-MCNC: 210 MG/DL
TRIGL SERPL-MCNC: 199 MG/DL

## 2017-05-17 PROCEDURE — 99214 OFFICE O/P EST MOD 30 MIN: CPT | Mod: S$GLB,,, | Performed by: FAMILY MEDICINE

## 2017-05-17 PROCEDURE — 3066F NEPHROPATHY DOC TX: CPT | Mod: S$GLB,,, | Performed by: FAMILY MEDICINE

## 2017-05-17 PROCEDURE — 3077F SYST BP >= 140 MM HG: CPT | Mod: S$GLB,,, | Performed by: FAMILY MEDICINE

## 2017-05-17 PROCEDURE — 1160F RVW MEDS BY RX/DR IN RCRD: CPT | Mod: S$GLB,,, | Performed by: FAMILY MEDICINE

## 2017-05-17 PROCEDURE — 3046F HEMOGLOBIN A1C LEVEL >9.0%: CPT | Mod: S$GLB,,, | Performed by: FAMILY MEDICINE

## 2017-05-17 PROCEDURE — 99999 PR PBB SHADOW E&M-EST. PATIENT-LVL III: CPT | Mod: PBBFAC,,, | Performed by: FAMILY MEDICINE

## 2017-05-17 PROCEDURE — 3080F DIAST BP >= 90 MM HG: CPT | Mod: S$GLB,,, | Performed by: FAMILY MEDICINE

## 2017-05-17 RX ORDER — ESCITALOPRAM OXALATE 20 MG/1
20 TABLET ORAL DAILY
Qty: 90 TABLET | Refills: 3 | Status: SHIPPED | OUTPATIENT
Start: 2017-05-17 | End: 2018-01-09

## 2017-05-17 RX ORDER — SPIRONOLACTONE 25 MG/1
25 TABLET ORAL DAILY
Qty: 90 TABLET | Refills: 3 | Status: SHIPPED | OUTPATIENT
Start: 2017-05-17 | End: 2017-10-12 | Stop reason: ALTCHOICE

## 2017-05-17 NOTE — MR AVS SNAPSHOT
[facility] [place]  [address]  [place] [address]  Phone: [phone]  Fax: [phone]                  Leanna García   2017 11:20 AM   Office Visit    Description:  Female : 1965   Provider:  Nolberto Lomax MD   Department:  [facility]           Reason for Visit     Follow-up     Diabetes     Hypertension           Diagnoses this Visit        Comments    Hypertension associated with diabetes    -  Primary     Biliary sludge         MANJINDER (generalized anxiety disorder)                To Do List           Future Appointments        Provider Department Dept Phone    2017 8:00 AM NM NM1 [facility] [phone]    2017 2:20 PM Nolberto Lomax MD [facility] [place] [phone]      Goals (5 Years of Data)     None       These Medications        Disp Refills Start End    spironolactone (ALDACTONE) 25 MG tablet 90 tablet 3 2017    Take 1 tablet (25 mg total) by mouth once daily. - Oral    Pharmacy: [facility] - [address] Ph #: [phone]       escitalopram oxalate (LEXAPRO) 20 MG tablet 90 tablet 3 2017    Take 1 tablet (20 mg total) by mouth once daily. - Oral    Pharmacy: [facility] - [place] [id number] [facility] Ph #: [phone]         [facility]     [facility] On Call Nurse Care Line -  Assistance  Unless otherwise directed by your provider, please contact [facility], our nurse care line that is available for  assistance.     Registered nurses in the [facility] provide: appointment scheduling, clinical advisement, health education, and other advisory services.  Call: [phone] (toll free)               Medications           Message regarding Medications     Verify the changes and/or additions to your medication regime listed below are the same as discussed with your clinician today.   If any of these changes or additions are incorrect, please notify your healthcare provider.        START taking these NEW medications        Refills    spironolactone (ALDACTONE) 25 MG tablet 3    Sig: Take 1 tablet (25 mg total) by mouth once daily.    Class: Normal    Route: Oral    escitalopram oxalate (LEXAPRO) 20 MG tablet 3    Sig: Take 1 tablet (20 mg total) by mouth once daily.    Class: Normal    Route: Oral      STOP taking these medications     VITAMIN D2 50,000 unit capsule TAKE 1 CAPSULE EVERY 7 DAYS.    hydrochlorothiazide (HYDRODIURIL) 12.5 MG Tab Take 1 tablet (12.5 mg total) by mouth once daily.           Verify that the below list of medications is an accurate representation of the medications you are currently taking.  If none reported, the list may be blank. If incorrect, please contact your healthcare provider. Carry this list with you in case of emergency.           Current Medications     albuterol (PROVENTIL) 2.5 mg /3 mL (0.083 %) nebulizer solution Take 2.5 mg by nebulization 2 (two) times daily as needed for Wheezing.    aspirin (ECOTRIN) 81 MG EC tablet Take 81 mg by mouth once daily.    atorvastatin (LIPITOR) 80 MG tablet Take 1 tablet (80 mg total) by mouth once daily.    blood sugar diagnostic Strp Inject 1 strip into the skin 3 (three) times daily. TrueResult Glucometer Strips.  Testing Daily.    butalbital-acetaminop-caf-cod -97-30 mg Cap Take 1 tablet by mouth every 12 (twelve) hours as needed.    fenofibrate 160 MG Tab Take 1 tablet (160 mg total) by mouth once daily.    fish oil-omega-3 fatty acids 300-1,000 mg capsule Take 2 g by mouth once daily.     furosemide (LASIX) 20 MG tablet Take 1 tablet (20 mg total) by mouth once daily.    glipiZIDE (GLUCOTROL) 10 MG tablet Take 1 tablet (10 mg total) by mouth 2 (two) times daily before meals.    lancets Misc Use to test blood sugar daily    True Results  DX:E11.9    losartan (COZAAR) 100 MG tablet Take 1 tablet (100 mg total) by  "mouth once daily.    metformin (GLUCOPHAGE) 500 MG tablet Take 2 tablets (1,000 mg total) by mouth 2 (two) times daily with meals.    metoprolol tartrate (LOPRESSOR) 25 MG tablet Take 1 tablet (25 mg total) by mouth 2 (two) times daily.    nifedipine (PROCARDIA-XL) 60 MG (OSM) 24 hr tablet Take 1 tablet (60 mg total) by mouth once daily.    polyethylene glycol (GLYCOLAX) 17 gram/dose powder Take 17 g by mouth once daily. 2 x daily for 2 weeks then once daily for 4 weeks, hold if loose bm and decrease to every other day    promethazine (PHENERGAN) 25 MG suppository Place 1 suppository (25 mg total) rectally every 6 (six) hours as needed for Nausea.    escitalopram oxalate (LEXAPRO) 20 MG tablet Take 1 tablet (20 mg total) by mouth once daily.    spironolactone (ALDACTONE) 25 MG tablet Take 1 tablet (25 mg total) by mouth once daily.           Clinical Reference Information           Your Vitals Were     BP Pulse Temp Resp Height Weight    185/92 (BP Location: Left arm, Patient Position: Sitting, BP Method: Automatic) 77 97.9 °F (36.6 °C) (Oral) 18 5' 8" (1.727 m) 87.9 kg (193 lb 12.6 oz)    BMI                29.46 kg/m2          Blood Pressure          Most Recent Value    BP  (!)  185/92      Allergies as of 5/17/2017     Dilaudid [Hydromorphone (Bulk)]    Lortab [Hydrocodone-acetaminophen]      Immunizations Administered on Date of Encounter - 5/17/2017     None      Orders Placed During Today's Visit     Future Labs/Procedures Expected by Expires    NM Hepatobiliary (HIDA) W Pharm and Ejection Fraction  5/17/2017 5/17/2018      Instructions      Established High Blood Pressure    High blood pressure (hypertension) is a chronic disease. Often health care providers dont know what causes it. But it can be caused by certain health conditions and medicines.  If you have high blood pressure, you may not have any symptoms. If you do have symptoms, they may include headache, dizziness, changes in your vision, chest " pain, and shortness of breath. But even without symptoms, high blood pressure thats not treated raises your risk for heart attack and stroke. High blood pressure is a serious health risk and shouldnt be ignored.  A blood pressure reading is made up of two numbers: a higher number over a lower number. The top number is the systolic pressure. The bottom number is the diastolic pressure. A normal blood pressure is less than 120 over less than 80.  High blood pressure is when either the top number is 140 or higher, or the bottom number is 90 or higher. This must be the result when taking your blood pressure a number of times. The blood pressures between normal and high are called prehypertension.  Home care  If you have high blood pressure, you should do what is listed below to lower your blood pressure. If you are taking medicines for high blood pressure, these methods may reduce or end your need for medicines in the future.  · Begin a weight-loss program if you are overweight.  · Cut back on how much salt you get in your diet. Heres how to do this:  ¨ Dont eat foods that have a lot of salt. These include olives, pickles, smoked meats, and salted potato chips.  ¨ Dont add salt to your food at the table.  ¨ Use only small amounts of salt when cooking.  · Begin an exercise program. Talk with your health care provider about the type of exercise program that would be best for you. It doesn't have to be hard. Even brisk walking for 20 minutes 3 times a week is a good form of exercise.  · Dont take medicines that have heart stimulants. This includes many cold and sinus decongestant pills and sprays, as well as diet pills. Check the warnings about hypertension on the label. Stimulants such as amphetamine or cocaine could be lethal for someone with high blood pressure. Never take these.  · Limit how much caffeine you get in your diet. Switch to caffeine-free products.  · Stop smoking. If you are a long-time smoker, this  can be hard. Enroll in a stop-smoking program to make it more likely that you will quit for good.  · Learn how to handle stress. This is an important part of any program to lower blood pressure. Learn about relaxation methods like meditation, yoga, or biofeedback.  · If your provider prescribed medicines, take them exactly as directed. Missing doses may cause your blood pressure get out of control.  · Consider buying an automatic blood pressure machine. You can get one of these at most pharmacies. Use this to watch your blood pressure at home. Give the results to your provider.  Follow-up care  You will need to make regular visits to your health care provider. This is to check your blood pressure and to make changes to your medicines. Make a follow-up appointment as directed.  When to seek medical advice  Call your health care provider right away if any of these occur:  · Chest pain or shortness of breath  · Severe headache  · Throbbing or rushing sound in the ears  · Nosebleed  · Sudden severe pain in your belly (abdomen)  · Extreme drowsiness, confusion, or fainting  · Dizziness or dizziness with a spinning sensation (vertigo)  · Weakness of an arm or leg or one side of the face  · You have problems speaking or seeing   Date Last Reviewed: 11/25/2014  © 4735-1386 [employer]. [address] [place] [address], [place] [address]. All rights reserved. This information is not intended as a substitute for professional medical care. Always follow your healthcare professional's instructions.             Language Assistance Services     ATTENTION: Language assistance services are available, free of charge. Please call [phone].      ATENCIÓN: Si habla sofiañol, tiene a pretty disposición servicios gratuitos de asistencia lingüística. Llame al [phone].     [facility] Ý: N?u b?n nói Ti?ng Vi?t, có các d?ch v? h? tr? ngôn ng? mi?n phí dành cho b?n. G?i s? [phone].         [facility] complies with  applicable Federal civil rights laws and does not discriminate on the basis of race, color, national origin, age, disability, or sex.

## 2017-05-18 PROBLEM — G47.19 EXCESSIVE DAYTIME SLEEPINESS: Status: RESOLVED | Noted: 2017-04-10 | Resolved: 2017-05-18

## 2017-05-18 LAB
ESTIMATED AVG GLUCOSE: 214 MG/DL
HBA1C MFR BLD HPLC: 9.1 %

## 2017-05-18 NOTE — PROGRESS NOTES
"[facility]  Progress Note    Subjective:       Patient ID: Leanna García is a 51 y.o. female.    Chief Complaint: Follow-up (3 month ); Diabetes; and Hypertension    HPI51 y.o.female is here today for three-month follow-up visit.  Patient is currently complaining of right upper quadrant pain.  Patient has had pain on and off for the past few months.  Patient was evaluated at the hospital and was found to have possible biliary sludge.  The patient was advised to have HIDA scan but has not scheduled thus far.  Patient has been compliant with all medications.  Patient's anxiety may increasing.  Patient has been compliant with Lexapro 10 mg.  Patient is unsure if medication is effective.  Patient has been taking daily blood pressure readings at home.  Patient's blood pressures have been averaging around 140-150 systolic.  No further complaints at today's visit.  Review of Systems   Constitutional: Negative for chills, fatigue and fever.   HENT: Negative for congestion, ear pain, postnasal drip, sinus pressure, sneezing and sore throat.    Eyes: Negative for pain.   Respiratory: Negative for cough, shortness of breath and wheezing.    Cardiovascular: Negative for chest pain, palpitations and leg swelling.   Gastrointestinal: Negative for abdominal distention, abdominal pain, constipation, diarrhea, nausea and vomiting.   Genitourinary: Negative for difficulty urinating.   Musculoskeletal: Negative for back pain and myalgias.   Skin: Negative for rash.   Neurological: Negative for dizziness, seizures, syncope, weakness and numbness.   Psychiatric/Behavioral: Negative for sleep disturbance. The patient is nervous/anxious.        Objective:      Vitals:    05/17/17 1052   BP: (!) 185/92   BP Location: Left arm   Patient Position: Sitting   BP Method: Automatic   Pulse: 77   Resp: 18   Temp: 97.9 °F (36.6 °C)   TempSrc: Oral   Weight: 87.9 kg (193 lb 12.6 oz)   Height: 5' 8" (1.727 m)     Body mass index is " 29.46 kg/(m^2).  Physical Exam   Constitutional: She is oriented to person, place, and time. She appears well-developed and well-nourished.   HENT:   Head: Normocephalic and atraumatic.   Bilateral cerumen impaction    Eyes: Conjunctivae and EOM are normal. Pupils are equal, round, and reactive to light.   Neck: Normal range of motion. Neck supple. No JVD present.   Cardiovascular: Normal rate, regular rhythm, normal heart sounds and intact distal pulses.  Exam reveals no gallop and no friction rub.    No murmur heard.  Pulmonary/Chest: Effort normal. No respiratory distress. She has no wheezes.   Abdominal: Soft. Bowel sounds are normal. There is no tenderness. There is no rebound and no guarding.   Musculoskeletal: Normal range of motion.   Neurological: She is alert and oriented to person, place, and time. No cranial nerve deficit.   Skin: Skin is warm and dry.   Psychiatric: Her behavior is normal. Judgment and thought content normal. Her mood appears anxious.   Nursing note and vitals reviewed.      Assessment:       1. Hypertension associated with diabetes    2. Biliary sludge    3. MANJINDER (generalized anxiety disorder)    4. Uncontrolled type 2 diabetes mellitus with stage 3 chronic kidney disease, without long-term current use of insulin    5. CKD (chronic kidney disease) stage 3, GFR 30-59 ml/min    6. Hyperlipidemia associated with type 2 diabetes mellitus    7. Obesity (BMI 30.0-34.9)    8. Bilateral impacted cerumen        Plan:       Hypertension associated with diabetes  -     spironolactone (ALDACTONE) 25 MG tablet; Take 1 tablet (25 mg total) by mouth once daily.  Dispense: 90 tablet; Refill: 3    Biliary sludge  -     NM Hepatobiliary (HIDA) W Pharm and Ejection Fraction; Future; Expected date: 5/17/17    MANJINDER (generalized anxiety disorder)  -     escitalopram oxalate (LEXAPRO) 20 MG tablet; Take 1 tablet (20 mg total) by mouth once daily.  Dispense: 90 tablet; Refill: 3    Uncontrolled type 2 diabetes  mellitus with stage 3 chronic kidney disease, without long-term current use of insulin        -  Stable continue to monitor     CKD (chronic kidney disease) stage 3, GFR 30-59 ml/min        -  Stable continue to monitor     Hyperlipidemia associated with type 2 diabetes mellitus        - Well controlled continue current medications    Obesity (BMI 30.0-34.9)        - Patient educated on diet and exercise     Bilateral impacted cerumen  - Water irrigation with hydrogen peroxide was performed on each ear followed up removal of ear wax manually with curette patient tolerated procedure well without any complications     Patient readiness: acceptance and barriers:none    During the course of the visit the patient was educated and counseled about the following:     Diabetes:  Discussed general issues about diabetes pathophysiology and management.  Addressed ADA diet.  Suggested low cholesterol diet.  Encouraged aerobic exercise.  Hypertension:   Dietary sodium restriction.  Regular aerobic exercise.  Check blood pressures daily and record.  Obesity:   General weight loss/lifestyle modification strategies discussed (elicit support from others; identify saboteurs; non-food rewards, etc).  Informal exercise measures discussed, e.g. taking stairs instead of elevator.  Regular aerobic exercise program discussed.    Goals: Diabetes: Maintain Hemoglobin A1C below 7, Hypertension: Reduce Blood Pressure and Obesity: Reduce calorie intake and BMI    Did patient meet goals/outcomes: Yes    The following self management tools provided: blood pressure log  blood glucose log  excercise log    Patient Instructions (the written plan) was given to the patient/family.     Time spent with patient: 30 minutes      Return in about 3 months (around 8/17/2017).  Nolberto Lomax MD  [facility]  5/18/2017 8:41 AM

## 2017-05-25 NOTE — TELEPHONE ENCOUNTER
Rosea was authorized. I have tried to fax it back to [facility] but fax line never picks up by them. I called their office and left a message advising that I can not get the fax to go through and that it was authorized. I asked them to call me back with a different fax #.

## 2017-05-26 ENCOUNTER — HOSPITAL ENCOUNTER (OUTPATIENT)
Dept: RADIOLOGY | Facility: HOSPITAL | Age: 52
Discharge: HOME | End: 2017-05-26
Attending: FAMILY MEDICINE
Payer: MEDICARE

## 2017-05-26 ENCOUNTER — DOCUMENTATION ONLY (OUTPATIENT)
Dept: FAMILY MEDICINE | Facility: CLINIC | Age: 52
End: 2017-05-26

## 2017-05-26 DIAGNOSIS — K83.8 BILIARY SLUDGE: ICD-10-CM

## 2017-05-26 PROCEDURE — A9537 TC99M MEBROFENIN: HCPCS

## 2017-05-26 PROCEDURE — 25000003 PHARM REV CODE 250

## 2017-05-26 PROCEDURE — 78227 HEPATOBIL SYST IMAGE W/DRUG: CPT | Mod: TC

## 2017-05-26 PROCEDURE — 78227 HEPATOBIL SYST IMAGE W/DRUG: CPT | Mod: 26,,, | Performed by: RADIOLOGY

## 2017-05-26 RX ADMIN — LONG CHAIN TRIGLYCERIDES 7.5 GRAM-67.5 KCAL/15 ML ORAL EMULSION 132 ML: at 08:05

## 2017-05-26 NOTE — PROGRESS NOTES
Pre-Visit Chart Review  For Appointment Scheduled on 5/30/17.    Health Maintenance Due   Topic Date Due    Eye Exam  08/12/1975    TETANUS VACCINE  08/12/1983    Pneumococcal PPSV23 (Medium Risk) (1) 08/12/1983    Mammogram  03/24/2017

## 2017-05-29 ENCOUNTER — TELEPHONE (OUTPATIENT)
Dept: FAMILY MEDICINE | Facility: CLINIC | Age: 52
End: 2017-05-29

## 2017-05-29 NOTE — TELEPHONE ENCOUNTER
Patient was scheduled to come in on 5/31/17 for a f/u visit but is unable to make it in due to transportation problems. She is requesting results from lab drawn 5/17/17. Please review and advise.

## 2017-05-29 NOTE — TELEPHONE ENCOUNTER
----- Message from Ilana Hook sent at 5/29/2017  2:46 PM CDT -----  Contact: self  Patient wants to speak  with a nurse regarding test results, states she can't make it to her appointment tomorrow. Please call back at [phone] (home)

## 2017-05-30 NOTE — TELEPHONE ENCOUNTER
Patient informed/verbalized understanding of results. Patient states that she is unable to keep appointments due to transportation. Informed patient to just follow up in 3 months to keep track of Diabetes. Scheduled appointment per patient request and mailed appointment reminder.

## 2017-05-30 NOTE — TELEPHONE ENCOUNTER
----- Message from Nolberto Lomax MD sent at 5/30/2017  7:59 AM CDT -----  Please call and inform patient that HIDA scan was normal.

## 2017-06-07 ENCOUNTER — TELEPHONE (OUTPATIENT)
Dept: FAMILY MEDICINE | Facility: CLINIC | Age: 52
End: 2017-06-07

## 2017-06-07 NOTE — TELEPHONE ENCOUNTER
Due to transportation, she requests lab drawn same day, 8/30/17 1120 , earlier than scheduled appointment. Will call her back to schedule.

## 2017-06-21 ENCOUNTER — TELEPHONE (OUTPATIENT)
Dept: FAMILY MEDICINE | Facility: CLINIC | Age: 52
End: 2017-06-21

## 2017-06-21 DIAGNOSIS — E55.9 VITAMIN D DEFICIENCY: Primary | ICD-10-CM

## 2017-06-21 RX ORDER — ERGOCALCIFEROL 1.25 MG/1
50000 CAPSULE ORAL
Qty: 12 CAPSULE | Refills: 3 | Status: SHIPPED | OUTPATIENT
Start: 2017-06-21 | End: 2018-01-09

## 2017-06-21 NOTE — TELEPHONE ENCOUNTER
----- Message from Nolberto Lomax MD sent at 6/21/2017 10:25 AM CDT -----  Please call informed patient that her recent hemoglobin A1c is slightly decreased from 9.5-9.1.  Patient still needs strict diabetic control.  Please make appointment to discuss.  Patient's vitamin D remains low.  Will send in vitamin D prescription.

## 2017-06-21 NOTE — TELEPHONE ENCOUNTER
Notes Recorded by Kaitlyn Prabhakar on 6/21/2017 at 2:42 PM CDT  Left message with today's time and date  ------

## 2017-06-22 NOTE — TELEPHONE ENCOUNTER
Spoke with patient; she has 8/2017 followup appointment already scheduled. Due to transportation and husbands' job, she cannot come sooner to discuss lab results/plan. She asks for next lab orders to be put in so she can draw on day of 8/2017 appointment.  Requests refill Zofran 4mg ODT sent to mail order. This is on historical med list, so can't put in refill.

## 2017-06-23 NOTE — TELEPHONE ENCOUNTER
MD MATT Medeiros Staff   Caller: Unspecified (Yesterday,  4:24 PM)             Please call and inform patient that we can decide at the appointment which lasted to be drawn if she is unable to have them drawn prior to the appointment

## 2017-06-26 ENCOUNTER — TELEPHONE (OUTPATIENT)
Dept: FAMILY MEDICINE | Facility: CLINIC | Age: 52
End: 2017-06-26

## 2017-06-26 DIAGNOSIS — R11.0 NAUSEA: Primary | ICD-10-CM

## 2017-06-26 RX ORDER — ONDANSETRON 4 MG/1
4 TABLET, FILM COATED ORAL EVERY 6 HOURS PRN
Qty: 90 TABLET | Refills: 3 | Status: SHIPPED | OUTPATIENT
Start: 2017-06-26 | End: 2017-10-12 | Stop reason: SDUPTHER

## 2017-06-26 NOTE — TELEPHONE ENCOUNTER
----- Message from Kristi Braxton sent at 6/26/2017  1:16 PM CDT -----  Contact: self  Patient is returning Dee's call regarding blood work. Patient needs to have blood work before her appointment due to it is fasting. Please call patient at [phone].  Thanks!

## 2017-06-26 NOTE — TELEPHONE ENCOUNTER
Asking for Zofran 4mg ODT q 6hrs PRN for 90days sent to mail order; this is on historical med list so PCP needs to enter new RX. Phenergan makes her sleepy. She agrees to same day lab draw following appointment with PCP

## 2017-06-26 NOTE — TELEPHONE ENCOUNTER
Patient has concerns about fasting for lab for 1120 appointment 8/2017. Did tell her to eat a little something, just not a heavy sugary meal. She depends on parents to bring her and is worried about them getting back home late. If she wants to come earlier that day, she can, and  will get to her as soon as he can, but probably earlier than her scheduled appointment. She will talk to her paretns.

## 2017-06-29 ENCOUNTER — TELEPHONE (OUTPATIENT)
Dept: FAMILY MEDICINE | Facility: CLINIC | Age: 52
End: 2017-06-29

## 2017-06-29 NOTE — TELEPHONE ENCOUNTER
MD MATT Medeiros Staff   Caller: Unspecified (Today, 11:15 AM)              There are other medications which may be beneficial.  These medications are specialized medicines.  They will need evaluation by headache neurologist

## 2017-06-29 NOTE — TELEPHONE ENCOUNTER
Patient will think about speciality and discuss at upcoming appointment. Due to transportation issues, she can't schedule any appointment now.

## 2017-06-29 NOTE — TELEPHONE ENCOUNTER
Talked with patient; she has daily migraines, waking up with them. Fioricet alone provides no relief. If she takes Fioricet with Aleve and uses ice pack, she gets some relief. She has tried Aleve/Ibuprofen and ice packs without any relief at all. She asks for different medication for migraines. She has transportation issues so hard to make appointment. Next scheuled is 8/2017

## 2017-06-29 NOTE — TELEPHONE ENCOUNTER
----- Message from Sonia Rodgers sent at 6/29/2017  9:59 AM CDT -----  De with Joni calling for patient/stated that patient is experiencing migraines/stated medication given is not helping/please call patient back at [phone] to advise.

## 2017-07-28 ENCOUNTER — TELEPHONE (OUTPATIENT)
Dept: FAMILY MEDICINE | Facility: CLINIC | Age: 52
End: 2017-07-28

## 2017-07-28 DIAGNOSIS — G43.909 MIGRAINE WITHOUT STATUS MIGRAINOSUS, NOT INTRACTABLE, UNSPECIFIED MIGRAINE TYPE: Primary | ICD-10-CM

## 2017-07-28 NOTE — TELEPHONE ENCOUNTER
Patient states that she's been having migraine headaches and was in and out of ER for migraine medication. Patient states Dr. Lomax prescribed migraine medication (Fiorcet) and was taking Aleve with it. Patient has also seen the eye doctor and was advised migraines may be sinus related. Patient is requesting referral to see a neurologist. Referral pended for your review.

## 2017-07-28 NOTE — TELEPHONE ENCOUNTER
----- Message from Erika Tavares sent at 7/28/2017  9:01 AM CDT -----  Contact: Patient  Patient called requesting a referral for neurology. Patient stated headaches is becoming unbearable.Please call back at [phone]. Thanks,

## 2017-07-28 NOTE — TELEPHONE ENCOUNTER
Patient returned call and advised of appointment scheduled with neurology. Address given to patient.

## 2017-08-17 ENCOUNTER — HOSPITAL ENCOUNTER (EMERGENCY)
Facility: HOSPITAL | Age: 52
Discharge: HOME | End: 2017-08-17
Attending: EMERGENCY MEDICINE
Payer: MEDICARE

## 2017-08-17 ENCOUNTER — TELEPHONE (OUTPATIENT)
Dept: FAMILY MEDICINE | Facility: CLINIC | Age: 52
End: 2017-08-17

## 2017-08-17 VITALS
RESPIRATION RATE: 20 BRPM | WEIGHT: 193 LBS | HEART RATE: 71 BPM | DIASTOLIC BLOOD PRESSURE: 79 MMHG | SYSTOLIC BLOOD PRESSURE: 173 MMHG | TEMPERATURE: 98 F | OXYGEN SATURATION: 93 % | HEIGHT: 68 IN | BODY MASS INDEX: 29.25 KG/M2

## 2017-08-17 DIAGNOSIS — G43.511 MIGRAINE AURA, PERSISTENT, INTRACTABLE, WITH STATUS MIGRAINOSUS: Primary | ICD-10-CM

## 2017-08-17 DIAGNOSIS — I10 HTN (HYPERTENSION): ICD-10-CM

## 2017-08-17 LAB
ALBUMIN SERPL BCP-MCNC: 3.1 G/DL
ALP SERPL-CCNC: 137 U/L
ALT SERPL W/O P-5'-P-CCNC: 12 U/L
ANION GAP SERPL CALC-SCNC: 15 MMOL/L
AST SERPL-CCNC: 18 U/L
BASOPHILS # BLD AUTO: 0.1 K/UL
BASOPHILS NFR BLD: 0.5 %
BILIRUB SERPL-MCNC: 0.4 MG/DL
BUN SERPL-MCNC: 22 MG/DL
CALCIUM SERPL-MCNC: 10.2 MG/DL
CHLORIDE SERPL-SCNC: 96 MMOL/L
CO2 SERPL-SCNC: 23 MMOL/L
CREAT SERPL-MCNC: 1.6 MG/DL
DIFFERENTIAL METHOD: ABNORMAL
EOSINOPHIL # BLD AUTO: 0.1 K/UL
EOSINOPHIL NFR BLD: 0.5 %
ERYTHROCYTE [DISTWIDTH] IN BLOOD BY AUTOMATED COUNT: 12.3 %
EST. GFR  (AFRICAN AMERICAN): 42 ML/MIN/1.73 M^2
EST. GFR  (NON AFRICAN AMERICAN): 37 ML/MIN/1.73 M^2
GLUCOSE SERPL-MCNC: 418 MG/DL
HCT VFR BLD AUTO: 38.4 %
HGB BLD-MCNC: 13.1 G/DL
LYMPHOCYTES # BLD AUTO: 1.4 K/UL
LYMPHOCYTES NFR BLD: 14 %
MCH RBC QN AUTO: 27.6 PG
MCHC RBC AUTO-ENTMCNC: 34 G/DL
MCV RBC AUTO: 81 FL
MONOCYTES # BLD AUTO: 0.5 K/UL
MONOCYTES NFR BLD: 4.9 %
NEUTROPHILS # BLD AUTO: 8.2 K/UL
NEUTROPHILS NFR BLD: 80.1 %
PLATELET # BLD AUTO: 245 K/UL
PMV BLD AUTO: 11.4 FL
POTASSIUM SERPL-SCNC: 4.7 MMOL/L
PROT SERPL-MCNC: 8 G/DL
RBC # BLD AUTO: 4.74 M/UL
SODIUM SERPL-SCNC: 134 MMOL/L
WBC # BLD AUTO: 10.3 K/UL

## 2017-08-17 PROCEDURE — 96365 THER/PROPH/DIAG IV INF INIT: CPT

## 2017-08-17 PROCEDURE — 99284 EMERGENCY DEPT VISIT MOD MDM: CPT | Mod: 25

## 2017-08-17 PROCEDURE — 96375 TX/PRO/DX INJ NEW DRUG ADDON: CPT

## 2017-08-17 PROCEDURE — 36415 COLL VENOUS BLD VENIPUNCTURE: CPT

## 2017-08-17 PROCEDURE — 25000003 PHARM REV CODE 250: Performed by: NURSE PRACTITIONER

## 2017-08-17 PROCEDURE — 63600175 PHARM REV CODE 636 W HCPCS: Performed by: NURSE PRACTITIONER

## 2017-08-17 PROCEDURE — 80053 COMPREHEN METABOLIC PANEL: CPT

## 2017-08-17 PROCEDURE — 93010 ELECTROCARDIOGRAM REPORT: CPT | Mod: ,,, | Performed by: INTERNAL MEDICINE

## 2017-08-17 PROCEDURE — 93005 ELECTROCARDIOGRAM TRACING: CPT

## 2017-08-17 PROCEDURE — 85025 COMPLETE CBC W/AUTO DIFF WBC: CPT

## 2017-08-17 RX ORDER — LABETALOL HYDROCHLORIDE 5 MG/ML
20 INJECTION, SOLUTION INTRAVENOUS
Status: COMPLETED | OUTPATIENT
Start: 2017-08-17 | End: 2017-08-17

## 2017-08-17 RX ORDER — ONDANSETRON 2 MG/ML
4 INJECTION INTRAMUSCULAR; INTRAVENOUS
Status: COMPLETED | OUTPATIENT
Start: 2017-08-17 | End: 2017-08-17

## 2017-08-17 RX ORDER — ONDANSETRON 4 MG/1
4 TABLET, FILM COATED ORAL EVERY 6 HOURS
Qty: 12 TABLET | Refills: 0 | Status: SHIPPED | OUTPATIENT
Start: 2017-08-17 | End: 2018-01-09

## 2017-08-17 RX ORDER — MORPHINE SULFATE 4 MG/ML
4 INJECTION, SOLUTION INTRAMUSCULAR; INTRAVENOUS
Status: COMPLETED | OUTPATIENT
Start: 2017-08-17 | End: 2017-08-17

## 2017-08-17 RX ORDER — DIPHENHYDRAMINE HYDROCHLORIDE 50 MG/ML
25 INJECTION INTRAMUSCULAR; INTRAVENOUS
Status: COMPLETED | OUTPATIENT
Start: 2017-08-17 | End: 2017-08-17

## 2017-08-17 RX ADMIN — PROMETHAZINE HYDROCHLORIDE 12.5 MG: 25 INJECTION INTRAMUSCULAR; INTRAVENOUS at 06:08

## 2017-08-17 RX ADMIN — DIPHENHYDRAMINE HYDROCHLORIDE 25 MG: 50 INJECTION, SOLUTION INTRAMUSCULAR; INTRAVENOUS at 06:08

## 2017-08-17 RX ADMIN — LABETALOL HYDROCHLORIDE 20 MG: 5 INJECTION, SOLUTION INTRAVENOUS at 06:08

## 2017-08-17 RX ADMIN — ONDANSETRON 4 MG: 2 INJECTION INTRAMUSCULAR; INTRAVENOUS at 06:08

## 2017-08-17 RX ADMIN — MORPHINE SULFATE 4 MG: 4 INJECTION INTRAVENOUS at 06:08

## 2017-08-17 NOTE — TELEPHONE ENCOUNTER
----- Message from Erika Tavares sent at 8/17/2017 11:17 AM CDT -----  Contact: Arsalan Arriaza with [employer]  called regarding medication for patient nausea. Requesting script.please call back at [phone]. Thanks,

## 2017-08-17 NOTE — TELEPHONE ENCOUNTER
Left message for De Chanel with [facility] to contact the office regarding medication for nausea.     Patient was prescribed Zofran back in June 2017. Is this the medication she is requesting a script for? Has 90 day supply with refills in her chart and sent to [facility] on 6/26/2017 via [facility].

## 2017-08-17 NOTE — ED PROVIDER NOTES
"Encounter Date: 8/17/2017       History     Chief Complaint   Patient presents with    Hypertension     headache today     Vomiting    Nausea     Patient is a 52 y.o. female who presents to the ED 08/17/2017 with a chief complaint of headache "all over" daily for months was relieved with Fioricet but now its not working; she has associated nausea and vomiting. She is unable to take her medications for BP and diabetes as she is too nauseous nd she is also vomiting. She takes "some pills" daily but isn't sure what.  She is unsure of what she took today.  PMH includes HTN, AFIB, DMII, HLD, hysterectomy, and hernia repair. She denies any weakness, numbness, tingling, dizziness, blurred vision, abdominal pain, fever, or dysuria. Denies history of TIA or CVA.  Headache today consistent All prior headaches.               Review of patient's allergies indicates:   Allergen Reactions    Dilaudid [hydromorphone (bulk)] Nausea And Vomiting     Severe GI upset    Lortab [hydrocodone-acetaminophen] Itching     Past Medical History:   Diagnosis Date    A-fib     resolved per patient    Arthritis     Bronchitis     Cardiovascular event risk, ASCVD 10-year risk 6.7% 10/15/2016    Diabetes mellitus     Diabetes mellitus type II     Encounter for blood transfusion     History of colon polyps 11/2/2016    Hyperlipidemia     Hypertension      Past Surgical History:   Procedure Laterality Date    COLONOSCOPY N/A 10/5/2016    Procedure: COLONOSCOPY;  Surgeon: Pillo Chanel MD;  Location: [facility];  Service: Endoscopy;  Laterality: N/A;    HERNIA REPAIR Bilateral 11/22/2016    inguinal    HYSTERECTOMY       Family History   Problem Relation Age of Onset    Hyperlipidemia Mother     Hypertension Mother     Hypertension Father     Diabetes Father     Diabetes Sister     Diabetes Sister     Diabetes Maternal Aunt     Diabetes Maternal Grandmother     Heart disease Maternal Grandmother     Cancer Paternal " Grandmother      Social History   Substance Use Topics    Smoking status: Never Smoker    Smokeless tobacco: Never Used    Alcohol use No     Review of Systems   Constitutional: Negative for fever.   Eyes: Negative for photophobia and visual disturbance.   Respiratory: Negative for chest tightness and shortness of breath.    Cardiovascular: Negative for chest pain and palpitations.   Gastrointestinal: Positive for nausea and vomiting. Negative for abdominal pain, constipation and diarrhea.   Genitourinary: Negative for dysuria.   Musculoskeletal: Negative for neck pain and neck stiffness.   Neurological: Positive for headaches. Negative for dizziness, tremors, seizures, syncope, facial asymmetry, speech difficulty, weakness, light-headedness and numbness (for months daily).   All other systems reviewed and are negative.      Physical Exam     Initial Vitals [08/17/17 1700]   BP Pulse Resp Temp SpO2   (!) 234/117 98 20 97.8 °F (36.6 °C) 98 %      MAP       156         Physical Exam    Constitutional: Vital signs are normal. She appears well-developed and well-nourished.   HENT:   Head: Normocephalic and atraumatic.   Eyes: Pupils are equal, round, and reactive to light.   Neck: Normal range of motion. Neck supple.   Cardiovascular: Normal rate, regular rhythm, normal heart sounds and intact distal pulses.   No murmur heard.  Pulmonary/Chest: Breath sounds normal.   Abdominal: Soft. Normal appearance and bowel sounds are normal. She exhibits no distension. There is no tenderness.   Musculoskeletal: Normal range of motion.   Neurological: She is alert and oriented to person, place, and time. She has normal strength. No cranial nerve deficit or sensory deficit.   Skin: Skin is warm, dry and intact.   Psychiatric: She has a normal mood and affect. Her speech is normal and behavior is normal.         ED Course   Procedures  Labs Reviewed   CBC W/ AUTO DIFFERENTIAL   COMPREHENSIVE METABOLIC PANEL        ECG Results           EKG 12-lead (Preliminary result)  Result time 08/17/17 17:27:48    ED Interpretation by Kee Felix MD (08/17/17 17:27:48)    Sinus rhythm 91 bpm LVH no ST elevation or depression no T-wave  inversion                               Medical Decision Making:   History:   Old Medical Records: I decided to obtain old medical records.  Differential Diagnosis:   CVA  Hypertensive emergency  Vertebral or carotid dissection  ACS      Clinical Tests:   Lab Tests: Ordered and Reviewed       APC / Resident Notes:   Patient is a 52 y.o. female who presents to the ED 08/18/2017 with a chief complaint of migraine with associated nausea daily for months.  Patient is neurologically intact on PE. Patient's migraine is typical of her daily migraines she's had for months. She has a scheduled neurology appointment for next month. She has been unable to take her medications for HTN at home due to nausea. Patient hypertensive throughout ER visit but controlled with IV medication.  Nausea and migraine resolved with antiemetics and narcotics.  No need for further imaging at this time. Patient agreeable to discharge and follow up with PCP and neurology. Given RX for zofran on discharge. Return precautions and follow up discussed with patient who verbalized understanding.                Attending Attestation:     Physician Attestation Statement for NP/PA:   I have conducted a face to face encounter with this patient in addition to the NP/PA, due to Medical Complexity    Other NP/PA Attestation Additions:      Medical Decision Making: I provided a face to face evaluation of this patient.  I discussed the patient's care with Advanced Practice Clinician.  I reviewed their note and agree with the history, physical, assessment, diagnosis, treatment, and discharge plan provided by the Advanced Practice Clinician. My overall impression is hypertension, headache.  The patient has been instructed to follow up with their physician or the one  provided as well as specific return precautions.                    ED Course   Value Comment By Time   BP: (!) 189/82 (Reviewed) Kee Felix MD 08/17 1857    Nausea much improved at this time Kee Felix MD 08/17 1931    52-year-old with history of hypertension diabetes presents with headache nausea and vomiting.  She is unable to take any of her medications due to the nausea and vomiting.  Headache is chronic for her not sudden in onset and not maximal in onset.  I see no reason for head CT.  Unremarkable neurologic exam in the ER.  At this time her headache is completely resolved after medication and blood pressure control.  Patient will be discharged home to take her nighttime blood pressure meds and given a prescription for nausea medication.  Patient has not required any other medication for blood pressure other than a one-time dose of labetalol.  I do not suspect hypertensive emergency at this time I see no reason for admission to the hospital this time.  Blood pressure is much better at this time and she is currently asymptomatic. Kee Felix MD 08/17 2026     Clinical Impression:   The primary encounter diagnosis was Migraine aura, persistent, intractable, with status migrainosus. A diagnosis of HTN (hypertension) was also pertinent to this visit.                           Mary Vargas NP  08/18/17 1603       Kee Felix MD  08/18/17 1943

## 2017-08-18 ENCOUNTER — TELEPHONE (OUTPATIENT)
Dept: FAMILY MEDICINE | Facility: CLINIC | Age: 52
End: 2017-08-18

## 2017-08-18 DIAGNOSIS — R11.0 NAUSEA: Primary | ICD-10-CM

## 2017-08-18 RX ORDER — ONDANSETRON 4 MG/1
4 TABLET, ORALLY DISINTEGRATING ORAL EVERY 6 HOURS PRN
Qty: 30 TABLET | Refills: 0 | Status: SHIPPED | OUTPATIENT
Start: 2017-08-18 | End: 2017-10-12 | Stop reason: DRUGHIGH

## 2017-09-12 ENCOUNTER — OFFICE VISIT (OUTPATIENT)
Dept: NEUROLOGY | Facility: CLINIC | Age: 52
End: 2017-09-12
Payer: MEDICARE

## 2017-09-12 VITALS
WEIGHT: 196 LBS | BODY MASS INDEX: 29.7 KG/M2 | HEART RATE: 64 BPM | RESPIRATION RATE: 19 BRPM | DIASTOLIC BLOOD PRESSURE: 80 MMHG | SYSTOLIC BLOOD PRESSURE: 152 MMHG | HEIGHT: 68 IN

## 2017-09-12 DIAGNOSIS — E11.59 HYPERTENSION ASSOCIATED WITH DIABETES: Chronic | ICD-10-CM

## 2017-09-12 DIAGNOSIS — I15.2 HYPERTENSION ASSOCIATED WITH DIABETES: Chronic | ICD-10-CM

## 2017-09-12 DIAGNOSIS — G89.29 CHRONIC INTRACTABLE HEADACHE, UNSPECIFIED HEADACHE TYPE: Primary | ICD-10-CM

## 2017-09-12 DIAGNOSIS — R51.9 CHRONIC INTRACTABLE HEADACHE, UNSPECIFIED HEADACHE TYPE: Primary | ICD-10-CM

## 2017-09-12 PROCEDURE — 3077F SYST BP >= 140 MM HG: CPT | Mod: S$GLB,,, | Performed by: PSYCHIATRY & NEUROLOGY

## 2017-09-12 PROCEDURE — 99999 PR PBB SHADOW E&M-EST. PATIENT-LVL III: CPT | Mod: PBBFAC,,, | Performed by: PSYCHIATRY & NEUROLOGY

## 2017-09-12 PROCEDURE — 3079F DIAST BP 80-89 MM HG: CPT | Mod: S$GLB,,, | Performed by: PSYCHIATRY & NEUROLOGY

## 2017-09-12 PROCEDURE — 3046F HEMOGLOBIN A1C LEVEL >9.0%: CPT | Mod: S$GLB,,, | Performed by: PSYCHIATRY & NEUROLOGY

## 2017-09-12 PROCEDURE — 3066F NEPHROPATHY DOC TX: CPT | Mod: S$GLB,,, | Performed by: PSYCHIATRY & NEUROLOGY

## 2017-09-12 PROCEDURE — 99204 OFFICE O/P NEW MOD 45 MIN: CPT | Mod: S$GLB,,, | Performed by: PSYCHIATRY & NEUROLOGY

## 2017-09-12 PROCEDURE — 3008F BODY MASS INDEX DOCD: CPT | Mod: S$GLB,,, | Performed by: PSYCHIATRY & NEUROLOGY

## 2017-09-12 RX ORDER — PROCHLORPERAZINE MALEATE 10 MG
10 TABLET ORAL EVERY 6 HOURS PRN
Qty: 60 TABLET | Refills: 3 | Status: SHIPPED | OUTPATIENT
Start: 2017-09-12 | End: 2018-01-09

## 2017-09-12 NOTE — LETTER
September 12, 2017      Nolberto Lomax MD  [address] [facility] [address]           [facility]  [address] [facility] [address] [place] [address]  Phone: [phone]  Fax: [phone]          Patient: Leanna García   MR Number: [id number]   YOB: 1965   Date of Visit: 9/12/2017       Dear Dr. Nolberto Lomax:    Thank you for referring Leanna García to me for evaluation. Attached you will find relevant portions of my assessment and plan of care.    If you have questions, please do not hesitate to call me. I look forward to following Leanna García along with you.    Sincerely,    Jyothi Lafleur MD    Enclosure  CC:  No Recipients    If you would like to receive this communication electronically, please contact [email or web address] or [phone] to request more information on [employer] Link access.    For providers and/or their staff who would like to refer a patient to [facility], please contact us through our one-stop-shop provider referral line, [facility], at [phone].    If you feel you have received this communication in error or would no longer like to receive these types of communications, please e-mail [email or web address]

## 2017-09-12 NOTE — PROGRESS NOTES
Date of service: 9/12/2017  Referring provider: Dr. Nolberto Lomax    Subjective:      Chief complaint: Migraine       Patient ID: Leanna García is a 52 y.o. lady with type 2 diabetes, hypertension, atrial fibrillation, chronic kidney disease, who presents for evaluation of headaches.     History of Present Illness    Headache History:  Age at onset and course over time: very occasional headaches until April 2017 when she started having them daily without clear triggering factor (no head or neck trauma, MVA, illness, change to life situation). Several ED visits for headache during which she has been severely hypertensive. There are days she delays taking her BP meds because of nausea. When she first woke up today her BP was 205/110, she had a severe headache and was extremely nauseous. Her usual AM blood pressure is 160-180/90, and headache will be lighter when it is in this range. When she takes her BP meds, her headache lightens up a little. She does believe she snores. Her headaches are severe first thing in AM and wake her up.   Location: frontal radiating into occipital and neck   Quality: stabbing, tight band, throbbing  Severity: 4 - 10   Duration:  Hours to days  Frequency: daily   Alleviated by: nothing  Exacerbated by: not sure  Associated with: sensitivity to light, nausea, vomiting, irritability, anger, anxiety; no visual changes  Sleep habits:  Caffeine intake: 2    Current acute treatment:  -- fioricet with codeine   -- aleve     Current prevention:  -- metoprolol 25mg BID - for HTN   (lexapro)    Previously tried/failed acute treatment:  -- none    Previously tried/failed preventative treatment:  -- gabapentin     Review of patient's allergies indicates:   Allergen Reactions    Dilaudid [hydromorphone (bulk)] Nausea And Vomiting     Severe GI upset    Lortab [hydrocodone-acetaminophen] Itching     Current Outpatient Prescriptions   Medication Sig Dispense Refill    albuterol (PROVENTIL) 2.5 mg  /3 mL (0.083 %) nebulizer solution Take 2.5 mg by nebulization 2 (two) times daily as needed for Wheezing.      aspirin (ECOTRIN) 81 MG EC tablet Take 81 mg by mouth once daily.      atorvastatin (LIPITOR) 80 MG tablet Take 1 tablet (80 mg total) by mouth once daily. 90 tablet 3    blood sugar diagnostic Strp Inject 1 strip into the skin 3 (three) times daily. TrueResult Glucometer Strips.  Testing Daily. 100 each 11    butalbital-acetaminop-caf-cod -79-30 mg Cap Take 1 tablet by mouth every 12 (twelve) hours as needed. 30 each 0    ergocalciferol (ERGOCALCIFEROL) 50,000 unit Cap Take 1 capsule (50,000 Units total) by mouth every 7 days. 12 capsule 3    escitalopram oxalate (LEXAPRO) 20 MG tablet Take 1 tablet (20 mg total) by mouth once daily. 90 tablet 3    fenofibrate 160 MG Tab Take 1 tablet (160 mg total) by mouth once daily. 90 tablet 3    fish oil-omega-3 fatty acids 300-1,000 mg capsule Take 2 g by mouth once daily.       furosemide (LASIX) 20 MG tablet Take 1 tablet (20 mg total) by mouth once daily. 90 tablet 1    glipiZIDE (GLUCOTROL) 10 MG tablet Take 1 tablet (10 mg total) by mouth 2 (two) times daily before meals. 180 tablet 3    lancets Misc Use to test blood sugar daily    True Results  DX:E11.9 (Patient taking differently: 3 (three) times daily. Use to test blood sugar daily    True Results  DX:E11.9) 100 each 3    losartan (COZAAR) 100 MG tablet Take 1 tablet (100 mg total) by mouth once daily. 90 tablet 3    metformin (GLUCOPHAGE) 500 MG tablet Take 2 tablets (1,000 mg total) by mouth 2 (two) times daily with meals. 360 tablet 3    metoprolol tartrate (LOPRESSOR) 25 MG tablet Take 1 tablet (25 mg total) by mouth 2 (two) times daily. 60 tablet 11    nifedipine (PROCARDIA-XL) 60 MG (OSM) 24 hr tablet Take 1 tablet (60 mg total) by mouth once daily. 90 tablet 0    ondansetron (ZOFRAN) 4 MG tablet Take 1 tablet (4 mg total) by mouth every 6 (six) hours as needed for Nausea. 90  tablet 3    ondansetron (ZOFRAN) 4 MG tablet Take 1 tablet (4 mg total) by mouth every 6 (six) hours. 12 tablet 0    ondansetron (ZOFRAN-ODT) 4 MG TbDL Take 1 tablet (4 mg total) by mouth every 6 (six) hours as needed. 30 tablet 0    polyethylene glycol (GLYCOLAX) 17 gram/dose powder Take 17 g by mouth once daily. 2 x daily for 2 weeks then once daily for 4 weeks, hold if loose bm and decrease to every other day 510 g 2    promethazine (PHENERGAN) 25 MG suppository Place 1 suppository (25 mg total) rectally every 6 (six) hours as needed for Nausea. 10 suppository 0    spironolactone (ALDACTONE) 25 MG tablet Take 1 tablet (25 mg total) by mouth once daily. 90 tablet 3    prochlorperazine (COMPAZINE) 10 MG tablet Take 1 tablet (10 mg total) by mouth every 6 (six) hours as needed (migraine or nausea). 60 tablet 3     No current facility-administered medications for this visit.        Past Medical History  Past Medical History:   Diagnosis Date    A-fib     resolved per patient    Arthritis     Bronchitis     Cardiovascular event risk, ASCVD 10-year risk 6.7% 10/15/2016    Diabetes mellitus     Diabetes mellitus type II     Encounter for blood transfusion     History of colon polyps 11/2/2016    Hyperlipidemia     Hypertension        Past Surgical History  Past Surgical History:   Procedure Laterality Date    COLONOSCOPY N/A 10/5/2016    Procedure: COLONOSCOPY;  Surgeon: Pillo Chanel MD;  Location: [facility];  Service: Endoscopy;  Laterality: N/A;    HERNIA REPAIR Bilateral 11/22/2016    inguinal    HYSTERECTOMY         Family History  Family History   Problem Relation Age of Onset    Hyperlipidemia Mother     Hypertension Mother     Hypertension Father     Diabetes Father     Diabetes Sister     Diabetes Sister     Diabetes Maternal Aunt     Diabetes Maternal Grandmother     Heart disease Maternal Grandmother     Cancer Paternal Grandmother        Social History  Social History      Social History    Marital status:      Spouse name: N/A    Number of children: N/A    Years of education: N/A     Occupational History    Not on file.     Social History Main Topics    Smoking status: Never Smoker    Smokeless tobacco: Never Used    Alcohol use No    Drug use: No    Sexual activity: Yes     Partners: Male     Other Topics Concern    Not on file     Social History Narrative    No narrative on file        Review of Systems  Constitutional: no fever, no chills  Eyes: no change to vision, no redness, no tearing  Ears, nose, mouth, throat: no hearing loss, no tinnitus, no rhinorrhea, no difficulty swallowing   Cardiovascular: no palpitations + chest pain   Respiratory: no shortness of breath  Gastrointestinal: no diarrhea, no constipation  Musculoskeletal: no joint pain  Skin: no rashes  Neurologic: no numbness, weakness, change to speech, loss of coordination   Endocrine: no heat or cold intolerance     Objective:        Vitals:    09/12/17 1453   BP: (!) 152/80   Pulse: 64   Resp: 19     Body mass index is 29.8 kg/m².    Constitutional: appears in no acute distress, well-developed, well-nourished     Eyes: normal conjunctiva, PERRLA, optic discs not well visualized either eye    Ears, nose, mouth, throat: external appearance of ears and nose normal, hearing intact to finger rub     Cardiovascular: regular rate and rhythm, no murmurs appreciated, + bounding pulse visible in carotids, no bruits heard     Respiratory: unlabored respirations, breath sounds normal bilaterally    Gastrointestinal: no abdominal masses, no tenderness, no visible hernia    Musculoskeletal: normal tone in all four extremities. No atrophy. No abnormal movements. Strength in all muscles groups of the upper and lower extremities is 5/5. Normal gait and station. Digits and nails normal.      Spine: cervical spine with normal ROM, no tenderness, no muscle spasm     Psychiatric: normal judgment and insight.  Oriented to person, place, and time.     Neurologic:   Cortical functions: recent and remote memory intact, normal attention span and concentration, speech fluent, adequate fund of knowledge   Cranial nerves: visual fields full, PERRLA, EOMI, facial sensation intact in V1-V3, symmetric facial strength, hearing intact to finger rub, palate elevates symmetrically, shoulder shrug 5/5, tongue protrudes midline   Reflexes: 2+ in the upper and lower extremities, no Babinski, no Tyler  Sensation: intact to light touch and temperature   Coordination: normal finger to noise    Data Review:     Results for orders placed or performed during the hospital encounter of 05/02/17   CT Head Without Contrast    Narrative    Comparison: 2/8/17    Technique: Axial 5-mm images are reviewed from the skull base to the vertex without contrast. Coronal and sagittal reconstructions are reviewed.    Findings: No acute intracranial hemorrhage.  No extra-axial fluid collection. No low attenuation changes to suggest acute infarct. No mass-effect or midline shift.  The ventricles and basal cisterns are within normal limits in size and configuration.      The bony calvarium is intact.  The paranasal sinuses and mastoid air cells are clear.    Impression    1.  No acute intracranial abnormalities seen.        In agreement with the preliminary [facility] report.       Electronically signed by: Zach Caballero MD  Date:     05/02/17  Time:    07:44        Lab Results   Component Value Date     (H) 10/14/2016     (L) 08/17/2017    K 4.7 08/17/2017    MG 2.7 (H) 11/28/2016    CL 96 08/17/2017    CO2 23 08/17/2017    BUN 22 (H) 08/17/2017    CREATININE 1.6 (H) 08/17/2017     (H) 08/17/2017    HGBA1C 9.1 (H) 05/17/2017    AST 18 08/17/2017    ALT 12 08/17/2017    ALBUMIN 3.1 (L) 08/17/2017    PROT 8.0 08/17/2017    BILITOT 0.4 08/17/2017    CHOL 257 (H) 05/17/2017    HDL 47 05/17/2017    LDLCALC 170.2 (H) 05/17/2017    TRIG 199 (H)  05/17/2017       Lab Results   Component Value Date    WBC 10.30 08/17/2017    HGB 13.1 08/17/2017    HCT 38.4 08/17/2017    MCV 81 (L) 08/17/2017     08/17/2017       Lab Results   Component Value Date    TSH 3.238 10/14/2016       Assessment & Plan:       Problem List Items Addressed This Visit     Hypertension associated with diabetes (Chronic)      Other Visit Diagnoses     Chronic intractable headache, unspecified headache type    -  Primary    Relevant Medications    prochlorperazine (COMPAZINE) 10 MG tablet    Other Relevant Orders    MRI Brain Without Contrast                Ms. García is a 52 year old lady with multiple medical comorbidities who experienced only rare headaches until approximately April 2017 when she started having them daily. This was not a gradual progression. This is associated with severe hypertension demonstrated in past visits to the ED and PCP office. She is on 4 medications for her blood pressure - spironolactone, metoprolol, nifedipine, and losartan yet she still wakes up with very high blood pressure (this morning was 205/110). I do not think this is secondary to pain. Her headache pattern is indicative of secondary headache rather than primary (new onset daily in person aged over 50) and most likely contributor is her severe hypertension which I think is actually causing her periods of hypertensive encephalopathy manifested as headache and nausea --- headaches reduce once she takes her BP pills. I think she needs further medical evaluation as to why she is still waking with such high pressures despite a strong regimen. I recommended she be evaluated for sleep apnea for this purpose and also because this can cause morning headaches. As I think her headaches are primarily secondary to hypertension I will not adjust her antihypertensives but rather relay my concerns to Dr. Lomax.     For further evaluation of secondary headache will send her for brain MRI.     For symptomatic  control we will start compazine as a gentle, non-rebound provoking regimen she can using daily for both pain and nausea.     WORKUP:  -- I strongly recommend a sleep study to evaluate for sleep apnea as a cause of resistant high blood pressure; this also causes morning headaches (I will send message to your PCP about this)    PREVENTION (use daily regardless of headache):  -- recommend adjustment to your blood pressure regimen to lower your morning blood pressure, will speak to PCP about thia    ACUTE TREATMENT:  -- start compazine half to full tablet as needed up to 4 times per day for headache and nausea (OK to use daily)       Return in about 6 weeks (around 10/24/2017).    Jyothi Lafleur MD    9/25/17 addendum     MRI brain completed 9/23/17. I reviewed the study which shows a chronic right basal ganglia lacunar stroke.

## 2017-09-12 NOTE — PATIENT INSTRUCTIONS
WORKUP:  -- brain MRI to investigate new headache   -- I strongly recommend a sleep study to evaluate for sleep apnea as a cause of resistant high blood pressure; this also causes morning headaches (I will send message to your PCP about this)    PREVENTION (use daily regardless of headache):  -- recommend adjustment to your blood pressure regimen to lower your morning blood pressure, will speak to PCP about this    ACUTE TREATMENT:  -- start compazine half to full tablet as needed up to 4 times per day for headache and nausea (OK to use daily)

## 2017-09-18 ENCOUNTER — TELEPHONE (OUTPATIENT)
Dept: FAMILY MEDICINE | Facility: CLINIC | Age: 52
End: 2017-09-18

## 2017-09-23 ENCOUNTER — HOSPITAL ENCOUNTER (OUTPATIENT)
Dept: RADIOLOGY | Facility: HOSPITAL | Age: 52
Discharge: HOME | End: 2017-09-23
Attending: PSYCHIATRY & NEUROLOGY
Payer: MEDICARE

## 2017-09-23 DIAGNOSIS — G89.29 CHRONIC INTRACTABLE HEADACHE, UNSPECIFIED HEADACHE TYPE: ICD-10-CM

## 2017-09-23 DIAGNOSIS — R51.9 CHRONIC INTRACTABLE HEADACHE, UNSPECIFIED HEADACHE TYPE: ICD-10-CM

## 2017-09-23 PROCEDURE — 70551 MRI BRAIN STEM W/O DYE: CPT | Mod: 26,,, | Performed by: RADIOLOGY

## 2017-09-23 PROCEDURE — 70551 MRI BRAIN STEM W/O DYE: CPT | Mod: TC

## 2017-10-09 ENCOUNTER — DOCUMENTATION ONLY (OUTPATIENT)
Dept: FAMILY MEDICINE | Facility: CLINIC | Age: 52
End: 2017-10-09

## 2017-10-09 NOTE — PROGRESS NOTES
Pre-Visit Chart Review  For Appointment Scheduled on 10/12/17.    Health Maintenance Due   Topic Date Due    Eye Exam  08/12/1975    TETANUS VACCINE  08/12/1983    Pneumococcal PPSV23 (Medium Risk) (1) 08/12/1983    Mammogram  03/24/2017    Influenza Vaccine  08/01/2017    Hemoglobin A1c  08/17/2017    Colonoscopy  10/05/2017

## 2017-10-10 DIAGNOSIS — E11.9 TYPE 2 DIABETES, HBA1C GOAL < 7%: Primary | ICD-10-CM

## 2017-10-10 DIAGNOSIS — E11.8 TYPE 2 DIABETES MELLITUS WITH COMPLICATION, WITHOUT LONG-TERM CURRENT USE OF INSULIN: ICD-10-CM

## 2017-10-10 DIAGNOSIS — Z12.39 SCREENING FOR BREAST CANCER: ICD-10-CM

## 2017-10-12 ENCOUNTER — OFFICE VISIT (OUTPATIENT)
Dept: FAMILY MEDICINE | Facility: CLINIC | Age: 52
End: 2017-10-12
Payer: MEDICARE

## 2017-10-12 ENCOUNTER — LAB VISIT (OUTPATIENT)
Dept: LAB | Facility: HOSPITAL | Age: 52
End: 2017-10-12
Attending: FAMILY MEDICINE
Payer: MEDICARE

## 2017-10-12 VITALS
SYSTOLIC BLOOD PRESSURE: 145 MMHG | BODY MASS INDEX: 29.07 KG/M2 | TEMPERATURE: 98 F | HEART RATE: 74 BPM | DIASTOLIC BLOOD PRESSURE: 79 MMHG | RESPIRATION RATE: 20 BRPM | WEIGHT: 191.81 LBS | HEIGHT: 68 IN

## 2017-10-12 DIAGNOSIS — E11.69 HYPERLIPIDEMIA ASSOCIATED WITH TYPE 2 DIABETES MELLITUS: ICD-10-CM

## 2017-10-12 DIAGNOSIS — E11.59 HYPERTENSION ASSOCIATED WITH DIABETES: Chronic | ICD-10-CM

## 2017-10-12 DIAGNOSIS — J06.9 UPPER RESPIRATORY TRACT INFECTION, UNSPECIFIED TYPE: ICD-10-CM

## 2017-10-12 DIAGNOSIS — Z29.9 PREVENTIVE MEASURE: ICD-10-CM

## 2017-10-12 DIAGNOSIS — N18.30 CKD (CHRONIC KIDNEY DISEASE) STAGE 3, GFR 30-59 ML/MIN: ICD-10-CM

## 2017-10-12 DIAGNOSIS — Z86.79 HISTORY OF ATRIAL FIBRILLATION: ICD-10-CM

## 2017-10-12 DIAGNOSIS — F41.1 GAD (GENERALIZED ANXIETY DISORDER): Primary | ICD-10-CM

## 2017-10-12 DIAGNOSIS — I15.2 HYPERTENSION ASSOCIATED WITH DIABETES: Chronic | ICD-10-CM

## 2017-10-12 DIAGNOSIS — E78.5 HYPERLIPIDEMIA ASSOCIATED WITH TYPE 2 DIABETES MELLITUS: ICD-10-CM

## 2017-10-12 LAB
ALBUMIN SERPL BCP-MCNC: 2.9 G/DL
ALP SERPL-CCNC: 108 U/L
ALT SERPL W/O P-5'-P-CCNC: 11 U/L
ANION GAP SERPL CALC-SCNC: 9 MMOL/L
AST SERPL-CCNC: 15 U/L
BILIRUB SERPL-MCNC: 0.4 MG/DL
BUN SERPL-MCNC: 36 MG/DL
CALCIUM SERPL-MCNC: 9.8 MG/DL
CHLORIDE SERPL-SCNC: 99 MMOL/L
CO2 SERPL-SCNC: 27 MMOL/L
CREAT SERPL-MCNC: 2.1 MG/DL
EST. GFR  (AFRICAN AMERICAN): 30.5 ML/MIN/1.73 M^2
EST. GFR  (NON AFRICAN AMERICAN): 26.5 ML/MIN/1.73 M^2
ESTIMATED AVG GLUCOSE: 321 MG/DL
GLUCOSE SERPL-MCNC: 402 MG/DL
HBA1C MFR BLD HPLC: 12.8 %
POTASSIUM SERPL-SCNC: 4.8 MMOL/L
PROT SERPL-MCNC: 7.3 G/DL
SODIUM SERPL-SCNC: 135 MMOL/L

## 2017-10-12 PROCEDURE — 99214 OFFICE O/P EST MOD 30 MIN: CPT | Mod: S$GLB,,, | Performed by: FAMILY MEDICINE

## 2017-10-12 PROCEDURE — 99999 PR PBB SHADOW E&M-EST. PATIENT-LVL IV: CPT | Mod: PBBFAC,,, | Performed by: FAMILY MEDICINE

## 2017-10-12 PROCEDURE — 80053 COMPREHEN METABOLIC PANEL: CPT

## 2017-10-12 PROCEDURE — 83036 HEMOGLOBIN GLYCOSYLATED A1C: CPT

## 2017-10-12 PROCEDURE — 36415 COLL VENOUS BLD VENIPUNCTURE: CPT | Mod: PO

## 2017-10-12 PROCEDURE — 86803 HEPATITIS C AB TEST: CPT

## 2017-10-12 RX ORDER — SPIRONOLACTONE 50 MG/1
50 TABLET, FILM COATED ORAL DAILY
Qty: 90 TABLET | Refills: 3 | Status: SHIPPED | OUTPATIENT
Start: 2017-10-12 | End: 2017-12-18 | Stop reason: HOSPADM

## 2017-10-12 RX ORDER — HYDROCHLOROTHIAZIDE 12.5 MG/1
1 TABLET ORAL DAILY
COMMUNITY
Start: 2017-08-22 | End: 2017-10-12 | Stop reason: ALTCHOICE

## 2017-10-12 RX ORDER — BENZONATATE 100 MG/1
100 CAPSULE ORAL 3 TIMES DAILY PRN
Qty: 30 CAPSULE | Refills: 0 | Status: SHIPPED | OUTPATIENT
Start: 2017-10-12 | End: 2017-10-22

## 2017-10-12 RX ORDER — TRAZODONE HYDROCHLORIDE 50 MG/1
50 TABLET ORAL NIGHTLY
Qty: 90 TABLET | Refills: 3 | Status: SHIPPED | OUTPATIENT
Start: 2017-10-12 | End: 2018-01-09

## 2017-10-12 NOTE — PROGRESS NOTES
[facility]  Progress Note    Subjective:       Patient ID: Leanna García is a 52 y.o. female.    Chief Complaint: Follow-up and Diabetes    HPI52 y.o.female comes in to clinic today for diabetes and hypertension management follow up. She checks her BP twice a day and it averages between 160-210 mmHg systolic. She is currently compliant with her BP medications. She is taking 4 BP medications: nifedipine, losartan, spironolactone, & metoprolol. We will increase her spironolactone dose from 25 to 50 mg daily to better control her BP. She was was asked to keep logging her BP daily at home & return in 2 weeks for review. She is also complaining of headaches for which she is following up with neurology. She says her anxiety is still present, she worries a lot depending on the situation & sometimes it effects her sleep (problems falling asleep). She is currently taking Lexapro 20 mg, and we discussed adding Trazodone for better control of her anxiety. She also brought up a concern about Metformin and her kidneys. She will schedule to see her nephrologist in the next month. She will also need a sleep study referral because neurologist told her she was concerned she may have MERCEDES. She says she does snore at night and wakes up frequently. She is complaining of a bothersome productive cough for 1.5 weeks and would like something for this issue. No other acute complaints today. She is due for lab work today.     Review of Systems   Constitutional: Negative for chills, fatigue and fever.   HENT: Negative for congestion, ear pain, postnasal drip, sinus pressure, sneezing and sore throat.    Eyes: Negative for pain.   Respiratory: Positive for cough (cough x 1.5 weeks). Negative for shortness of breath and wheezing.    Cardiovascular: Negative for chest pain, palpitations and leg swelling.   Gastrointestinal: Negative for abdominal distention, abdominal pain, constipation, diarrhea, nausea and vomiting.  "  Genitourinary: Negative for difficulty urinating.   Musculoskeletal: Negative for back pain and myalgias.   Skin: Negative for rash.   Neurological: Positive for headaches. Negative for dizziness, seizures, syncope, weakness and numbness.   Psychiatric/Behavioral: Positive for sleep disturbance. The patient is nervous/anxious.        Objective:      Vitals:    10/12/17 1244 10/12/17 1247 10/12/17 1312   BP: (!) 229/109  Comment: took b/p meds am (!) 204/98 (!) 145/79   BP Location: Right arm Left arm    Patient Position: Sitting Sitting    BP Method: Medium (Automatic) Medium (Manual)    Pulse: 74     Resp: 20     Temp: 98.4 °F (36.9 °C)     TempSrc: Oral     Weight: 87 kg (191 lb 12.8 oz)     Height: 5' 8" (1.727 m)       Body mass index is 29.16 kg/m².  Physical Exam   Constitutional: She is oriented to person, place, and time. She appears well-developed and well-nourished.   HENT:   Head: Normocephalic and atraumatic.   Eyes: Conjunctivae and EOM are normal. Pupils are equal, round, and reactive to light.   Neck: Normal range of motion. Neck supple. No JVD present.   Cardiovascular: Normal rate, regular rhythm, normal heart sounds and intact distal pulses.  Exam reveals no gallop and no friction rub.    No murmur heard.  Pulmonary/Chest: Effort normal. No respiratory distress. She has no wheezes.   Abdominal: Soft. Bowel sounds are normal. There is no tenderness. There is no rebound and no guarding.   Musculoskeletal: Normal range of motion.   Neurological: She is alert and oriented to person, place, and time. No cranial nerve deficit.   Skin: Skin is warm and dry.   Psychiatric: Her behavior is normal. Judgment and thought content normal. Her mood appears anxious.   Nursing note and vitals reviewed.      Assessment:       1. MANJINDER (generalized anxiety disorder)    2. Hypertension associated with diabetes    3. Hyperlipidemia associated with type 2 diabetes mellitus    4. History of atrial fibrillation, 2015  "   5. CKD (chronic kidney disease) stage 3, GFR 30-59 ml/min    6. Uncontrolled type 2 diabetes mellitus with stage 3 chronic kidney disease, without long-term current use of insulin    7. Preventive measure    8. Upper respiratory tract infection, unspecified type        Plan:       MANJINDER (generalized anxiety disorder)  -     trazodone (DESYREL) 50 MG tablet; Take 1 tablet (50 mg total) by mouth every evening.  Dispense: 90 tablet; Refill: 3    Hypertension associated with diabetes  -     spironolactone (ALDACTONE) 50 MG tablet; Take 1 tablet (50 mg total) by mouth once daily.  Dispense: 90 tablet; Refill: 3  -     Ambulatory consult to Sleep Disorders    Hyperlipidemia associated with type 2 diabetes mellitus        - Well controlled continue current medications    History of atrial fibrillation, 2015        -  Stable continue to monitor     CKD (chronic kidney disease) stage 3, GFR 30-59 ml/min  -     Comprehensive metabolic panel; Future; Expected date: 10/12/2017  - Follow up with nephrology to bring up concern of Metformin adverse effects with CKD. (recent GFR 37)    Uncontrolled type 2 diabetes mellitus with stage 3 chronic kidney disease, without long-term current use of insulin  -     Microalbumin/creatinine urine ratio; Future; Expected date: 10/12/2017  -     Hemoglobin A1c; Future; Expected date: 10/12/2017    Preventive measure  -     Hepatitis C antibody; Future; Expected date: 10/12/2017    Upper respiratory tract infection, unspecified type  -     benzonatate (TESSALON) 100 MG capsule; Take 1 capsule (100 mg total) by mouth 3 (three) times daily as needed for Cough.  Dispense: 30 capsule; Refill: 0    Patient readiness: acceptance and barriers:none    During the course of the visit the patient was educated and counseled about the following:     Hypertension:   Medication: increase to spironolactone 25 to 50mg.    Goals: Hypertension: Reduce Blood Pressure    Did patient meet goals/outcomes: No    The  following self management tools provided: blood pressure log    Patient Instructions (the written plan) was given to the patient/family.     Time spent with patient: 30 minutes      Return to clinic in 2 weeks for hypertension management.    Nolberto Lomax MD  [facility]  10/12/2017 1:35 PM

## 2017-10-13 LAB — HCV AB SERPL QL IA: NEGATIVE

## 2017-10-17 ENCOUNTER — TELEPHONE (OUTPATIENT)
Dept: FAMILY MEDICINE | Facility: CLINIC | Age: 52
End: 2017-10-17

## 2017-10-17 NOTE — TELEPHONE ENCOUNTER
----- Message from Ladonna Garcia sent at 10/17/2017  1:01 PM CDT -----  Contact: Shwetha Chanle - [facility] Care Manager   Shwetha morales [facility]  called and states that patient is a patient of Dr Lomax and he has ordered patient to come see him ever two weeks to see him for her blood pressure and is un able to drive to see him every two weeks due to financial reasons and Shwetha morales [facility] is wanting to know if he could possibly do home health care for her until blood pressure is under control. Please call Shwetha back [phone]

## 2017-10-17 NOTE — TELEPHONE ENCOUNTER
Talked with Joni, can send order for evaluate for service to [facility] (per Moira's request) for BP checks

## 2017-10-18 NOTE — TELEPHONE ENCOUNTER
----- Message from Nolberto Lomax MD sent at 10/13/2017 12:56 PM CDT -----  Please call and inform patient that there hemoglobin A1c is 12.8.  Her kidney function has also increased.  Her medical conditions are not under control.  High risk for having a heart attack.  Please have patient make appointment as soon as possible.

## 2017-10-18 NOTE — TELEPHONE ENCOUNTER
Please consider order to case management for patient. Transportation is her main stressor to come for appointments. Also HH order for evaluation BP check every 2weeks and any other assistance.   She will not be able to keep 10/27/17 PCP appointment because  can't take off work and parents live in [place]. Reviewed results with her and encouraged her to try to make appointment for November for discussion plan of care for diabetes/hypertension. She will do her best.

## 2017-10-19 DIAGNOSIS — I15.2 HYPERTENSION ASSOCIATED WITH DIABETES: Chronic | ICD-10-CM

## 2017-10-19 DIAGNOSIS — I15.2 HYPERTENSION ASSOCIATED WITH DIABETES: Primary | Chronic | ICD-10-CM

## 2017-10-19 DIAGNOSIS — E11.59 HYPERTENSION ASSOCIATED WITH DIABETES: Primary | Chronic | ICD-10-CM

## 2017-10-19 DIAGNOSIS — E11.59 HYPERTENSION ASSOCIATED WITH DIABETES: Chronic | ICD-10-CM

## 2017-10-19 DIAGNOSIS — F41.1 GAD (GENERALIZED ANXIETY DISORDER): Primary | ICD-10-CM

## 2017-10-19 NOTE — TELEPHONE ENCOUNTER
Please change HH on [facility], [facility] [employer] returns call with [facility] as covered provider. Will fax to [place] [phone]

## 2017-10-19 NOTE — TELEPHONE ENCOUNTER
Home health orders have been placed. (Routing comment PER   Talked with [place], they do not contract with [employer]. Phone message for Joni Tadeo Rep to call back with  that accepts [employer].

## 2017-10-19 NOTE — TELEPHONE ENCOUNTER
Please order HH for evaluation BP checks weekly and other services she may qualify for. [facility]

## 2017-10-20 DIAGNOSIS — E11.8 TYPE 2 DIABETES MELLITUS WITH COMPLICATION, WITH LONG-TERM CURRENT USE OF INSULIN: ICD-10-CM

## 2017-10-20 DIAGNOSIS — E78.00 HYPERCHOLESTEREMIA: ICD-10-CM

## 2017-10-20 DIAGNOSIS — E11.59 HYPERTENSION ASSOCIATED WITH DIABETES: Chronic | ICD-10-CM

## 2017-10-20 DIAGNOSIS — Z79.4 TYPE 2 DIABETES MELLITUS WITH COMPLICATION, WITH LONG-TERM CURRENT USE OF INSULIN: ICD-10-CM

## 2017-10-20 DIAGNOSIS — I15.2 HYPERTENSION ASSOCIATED WITH DIABETES: Chronic | ICD-10-CM

## 2017-10-20 NOTE — TELEPHONE ENCOUNTER
[email or web address]. Patient /102 after meds complains of HA.  Will see patient for BP checks 2xwk X 3wks    Asks for recent labs faxed to [phone]

## 2017-10-20 NOTE — TELEPHONE ENCOUNTER
----- Message from Sofie Taylor sent at 10/20/2017 10:31 AM CDT -----  Please call   [facility]  Vicky  At [phone]  Placed a call to  Pod ,  Needs to report a  Higher  Blood  Pressure   #

## 2017-10-24 ENCOUNTER — OUTPATIENT CASE MANAGEMENT (OUTPATIENT)
Dept: ADMINISTRATIVE | Facility: OTHER | Age: 52
End: 2017-10-24

## 2017-10-24 NOTE — PROGRESS NOTES
Please note the following patient has been assigned to Daniella Churchill RN in Outpatient Case Management for Diabetes Disease Management with a hbA1C greater than 10.0.    Please contact [facility] at Ext. [phone] with any questions.    Thank you,  Renetta Pizarro

## 2017-10-25 DIAGNOSIS — E11.9 TYPE 2 DIABETES, HBA1C GOAL < 7%: Primary | ICD-10-CM

## 2017-10-26 NOTE — TELEPHONE ENCOUNTER
Spoke with patient; wanted to reorder her refills she forgot to ask provider for at last visit 10/12/17.

## 2017-10-27 RX ORDER — METFORMIN HYDROCHLORIDE 500 MG/1
1000 TABLET ORAL 2 TIMES DAILY WITH MEALS
Qty: 360 TABLET | Refills: 3 | Status: SHIPPED | OUTPATIENT
Start: 2017-10-27 | End: 2018-01-09

## 2017-10-27 RX ORDER — FENOFIBRATE 160 MG/1
160 TABLET ORAL DAILY
Qty: 90 TABLET | Refills: 3 | Status: SHIPPED | OUTPATIENT
Start: 2017-10-27 | End: 2018-03-23 | Stop reason: SDUPTHER

## 2017-10-27 RX ORDER — ATORVASTATIN CALCIUM 80 MG/1
80 TABLET, FILM COATED ORAL DAILY
Qty: 90 TABLET | Refills: 3 | Status: SHIPPED | OUTPATIENT
Start: 2017-10-27 | End: 2018-03-23 | Stop reason: SDUPTHER

## 2017-10-27 RX ORDER — LOSARTAN POTASSIUM 100 MG/1
100 TABLET ORAL DAILY
Qty: 90 TABLET | Refills: 3 | Status: SHIPPED | OUTPATIENT
Start: 2017-10-27 | End: 2017-12-18 | Stop reason: HOSPADM

## 2017-10-27 RX ORDER — NIFEDIPINE 60 MG/1
60 TABLET, EXTENDED RELEASE ORAL DAILY
Qty: 90 TABLET | Refills: 3 | Status: ON HOLD | OUTPATIENT
Start: 2017-10-27 | End: 2018-02-11

## 2017-10-27 RX ORDER — ASPIRIN 81 MG/1
81 TABLET ORAL DAILY
COMMUNITY
Start: 2017-10-27 | End: 2019-03-14 | Stop reason: SDUPTHER

## 2017-10-27 RX ORDER — GLIPIZIDE 10 MG/1
10 TABLET ORAL
Qty: 180 TABLET | Refills: 3 | Status: ON HOLD | OUTPATIENT
Start: 2017-10-27 | End: 2018-02-11

## 2017-10-27 RX ORDER — METOPROLOL TARTRATE 25 MG/1
25 TABLET, FILM COATED ORAL 2 TIMES DAILY
Qty: 180 TABLET | Refills: 3 | Status: SHIPPED | OUTPATIENT
Start: 2017-10-27 | End: 2018-01-09

## 2017-10-27 NOTE — TELEPHONE ENCOUNTER
----- Message from Kristi Braxton sent at 10/27/2017 11:14 AM CDT -----  Contact: self  Patient is returning call.  Please call patient at [phone].  Thanks!

## 2017-10-30 ENCOUNTER — TELEPHONE (OUTPATIENT)
Dept: FAMILY MEDICINE | Facility: CLINIC | Age: 52
End: 2017-10-30

## 2017-10-30 NOTE — TELEPHONE ENCOUNTER
Spoke to patient's home health Vicky who wanted to let us know patient had a 5lb weight increase last week, lost the weight but now patient has + 1 edema on ankles,lungs clear.

## 2017-10-30 NOTE — TELEPHONE ENCOUNTER
----- Message from Rand Taveras sent at 10/30/2017  1:16 PM CDT -----  Contact: Vicky Doherty  Wanted to discuss the swelling her both of her ankles with Dee.  Please call [phone].  Thank you!

## 2017-11-06 ENCOUNTER — OUTPATIENT CASE MANAGEMENT (OUTPATIENT)
Dept: ADMINISTRATIVE | Facility: OTHER | Age: 52
End: 2017-11-06

## 2017-11-06 NOTE — PROGRESS NOTES
11/06/17-Called and spoke to Leanna García, 52 year old female. Patient lives with her . Patient has recently started babysitting her grandsons, 7 months old and 3 years old. Patient does not drive. Patient's  or parents drive her to her doctor's appointments. Patient has [facility] nurse. Patient checks her blood pressure, blood glucose and weight daily. Patient states that the [facility] home health nurse, Vicky Vargas,  is keeping a log. Patient's A1C jumped to 12.8 on 10/12/17, on 05/17/17 her A1C was 9.1. Patient states that she was eating all wrong. Eating too much sodium and too many unhealthy food choices. Patient saw a neurologist for her headaches and was placed on a medication for her migraines which has helped. Patient is not having any transportation problems at this time. Her parents bring her to the doctor every three months. Patient has tried to quit running to the doctor for every little thing. Patient states that since the doctor doubled up her spironolactone. Her blood pressure is under control. Patient complaining of still having fluids in her feet and legs at times. Will mail to patient educational material on diabetes and hypertension with diabetic/low sodium diet.  Will follow up with patient in two weeks.

## 2017-11-20 ENCOUNTER — OUTPATIENT CASE MANAGEMENT (OUTPATIENT)
Dept: ADMINISTRATIVE | Facility: OTHER | Age: 52
End: 2017-11-20

## 2017-11-20 NOTE — PROGRESS NOTES
11/20/17-Called and spoke to patient. Patient has home health nurse coming and checking her blood pressure and blood glucose. Home health is releasing her on 12/18/17. Discussed appropriate use of home health with patient.  Patient is weighing daily and keeping her log. Patient with no swelling in her feet.  Went over educational material on eyes, feet, healthy diet and what is high blood pressure. Patient's blood pressure is 140's over 80's with no major headaches. Patient blood glucose is from . Talked to patient about her A1C and her upcoming A1C in January. Patient is taking all her medications. Went over with patient her eating habits. Patient is ok with not eating bread. For lunch she had tuna and some spices. Will continue to -  · Encourage Dietary Compliance.  · Encourage Exercise.  · Encourage Medication Compliance.  · Recognize and provide educational material (LENNY).  · Will follow up with patient in three weeks and continue education.

## 2017-11-29 ENCOUNTER — TELEPHONE (OUTPATIENT)
Dept: FAMILY MEDICINE | Facility: CLINIC | Age: 52
End: 2017-11-29

## 2017-11-29 NOTE — TELEPHONE ENCOUNTER
----- Message from Kristi Braxton sent at 11/29/2017 10:43 AM CST -----  Contact: Vicky with [facility]  Vicky with [facility] calling regarding patient had a 5 lb weight gain in 8 days, blood pressure 150 over 90, dry cough and crackles right lower lobe. Please call Vicky at [phone]. Thanks!

## 2017-11-29 NOTE — TELEPHONE ENCOUNTER
Spoke to nurse Perry with [facility]. Patient has had a 5# weight gain in 8 days. 1+ edema bilat lower extremities to pretibial. Crackles present in rt lower lobe. Denies SOB.  She does have a slight dry cough which is not normal for this patient. She takes spironolactone 50 mg daily.             Patient has no transportation to get to clinic. Please adivse.

## 2017-11-29 NOTE — TELEPHONE ENCOUNTER
----- Message from Beronica Richards sent at 11/29/2017  1:26 PM CST -----  [facility] home care, Vicky, is returning a call from office please call, [phone]

## 2017-11-30 ENCOUNTER — TELEPHONE (OUTPATIENT)
Dept: FAMILY MEDICINE | Facility: CLINIC | Age: 52
End: 2017-11-30

## 2017-11-30 ENCOUNTER — OFFICE VISIT (OUTPATIENT)
Dept: FAMILY MEDICINE | Facility: CLINIC | Age: 52
End: 2017-11-30
Payer: MEDICARE

## 2017-11-30 VITALS
RESPIRATION RATE: 18 BRPM | DIASTOLIC BLOOD PRESSURE: 82 MMHG | BODY MASS INDEX: 31.17 KG/M2 | HEIGHT: 68 IN | SYSTOLIC BLOOD PRESSURE: 155 MMHG | TEMPERATURE: 98 F | OXYGEN SATURATION: 96 % | HEART RATE: 68 BPM | WEIGHT: 205.69 LBS

## 2017-11-30 DIAGNOSIS — I10 ESSENTIAL HYPERTENSION: ICD-10-CM

## 2017-11-30 DIAGNOSIS — J45.21 MILD INTERMITTENT REACTIVE AIRWAY DISEASE WITH ACUTE EXACERBATION: Primary | ICD-10-CM

## 2017-11-30 PROCEDURE — 99214 OFFICE O/P EST MOD 30 MIN: CPT | Mod: S$GLB,,, | Performed by: FAMILY MEDICINE

## 2017-11-30 PROCEDURE — 99999 PR PBB SHADOW E&M-EST. PATIENT-LVL III: CPT | Mod: PBBFAC,,, | Performed by: FAMILY MEDICINE

## 2017-11-30 RX ORDER — MONTELUKAST SODIUM 10 MG/1
10 TABLET ORAL NIGHTLY
Qty: 30 TABLET | Refills: 3 | Status: SHIPPED | OUTPATIENT
Start: 2017-11-30 | End: 2017-12-30

## 2017-11-30 RX ORDER — ALBUTEROL SULFATE 90 UG/1
2 AEROSOL, METERED RESPIRATORY (INHALATION) 4 TIMES DAILY
Qty: 1 INHALER | Refills: 1 | Status: SHIPPED | OUTPATIENT
Start: 2017-11-30 | End: 2018-10-23 | Stop reason: SDUPTHER

## 2017-11-30 RX ORDER — FUROSEMIDE 20 MG/1
20 TABLET ORAL DAILY
Qty: 90 TABLET | Refills: 1 | Status: SHIPPED | OUTPATIENT
Start: 2017-11-30 | End: 2017-12-06 | Stop reason: SDUPTHER

## 2017-11-30 NOTE — TELEPHONE ENCOUNTER
----- Message from Kristi Braxton sent at 11/30/2017 10:54 AM CST -----  Contact: self  Patient called requesting a medication refill. Patient is out of medication.  Please see details below.  Medication Name:furosemide (LASIX)   Medication Strength:20 mg  [facility]  First time calling about this refill (y/n):n  Call Back Number: Patient states the home health nurse called it in yesterday.    [facility] - [place], [place] - [address]  [address] [address]  Phone: [phone] Fax: [phone]

## 2017-11-30 NOTE — TELEPHONE ENCOUNTER
----- Message from Leilani Case sent at 11/30/2017  8:20 AM CST -----  Contact: self [phone]  She called back to confirm that she will be in for her appt today.  Thank you!

## 2017-11-30 NOTE — TELEPHONE ENCOUNTER
Patient is having a cough and chest congestion. Scheduled to see DR Dial this afternoon at 1:40. Patient will call back to confirm that she will have transportation for this appointment.

## 2017-11-30 NOTE — TELEPHONE ENCOUNTER
Patient informed and verbalizes full understanding.  Needs refill sent to [facility] in [place]. Request sent in separate encounter.

## 2017-11-30 NOTE — PROGRESS NOTES
Subjective:       Patient ID: Leanna García is a 52 y.o. female.    Chief Complaint: Sore Throat and Cough    Sore Throat    This is a new problem. The current episode started in the past 7 days. The problem has been waxing and waning. There has been no fever. Associated symptoms include coughing. Pertinent negatives include no abdominal pain, headaches or shortness of breath.   Cough   This is a new problem. Episode onset: 6-8 weeks. The problem has been unchanged. The problem occurs every few minutes. The cough is non-productive. Associated symptoms include heartburn (rarely), nasal congestion, postnasal drip and a sore throat. Pertinent negatives include no chest pain, fever, headaches, rash, shortness of breath or wheezing. Associated symptoms comments: Eyes at times red or irritated.  . She has tried OTC cough suppressant for the symptoms. The treatment provided no relief. Her past medical history is significant for environmental allergies.     Review of Systems   Constitutional: Negative for fever.   HENT: Positive for postnasal drip and sore throat.    Respiratory: Positive for cough. Negative for shortness of breath and wheezing.    Cardiovascular: Negative for chest pain.   Gastrointestinal: Positive for heartburn (rarely). Negative for abdominal pain and nausea.   Skin: Negative for rash.   Allergic/Immunologic: Positive for environmental allergies.   Neurological: Negative for headaches.   All other systems reviewed and are negative.      Objective:      Physical Exam   Constitutional: She appears well-developed. No distress.   HENT:   Right Ear: Tympanic membrane is not erythematous.   Left Ear: Tympanic membrane is not erythematous.   Nose: Mucosal edema present. Right sinus exhibits no maxillary sinus tenderness. Left sinus exhibits no maxillary sinus tenderness.   Mouth/Throat: Posterior oropharyngeal erythema present.   Neck: Neck supple.   Cardiovascular: Normal rate and regular rhythm.    No  murmur heard.  Pulmonary/Chest: Effort normal and breath sounds normal.   Lymphadenopathy:     She has no cervical adenopathy.       Assessment:       1. Mild intermittent reactive airway disease with acute exacerbation        Plan:         Leanna was seen today for sore throat and cough.    Diagnoses and all orders for this visit:    Mild intermittent reactive airway disease with acute exacerbation    Other orders  -     montelukast (SINGULAIR) 10 mg tablet; Take 1 tablet (10 mg total) by mouth every evening.  -     albuterol 90 mcg/actuation inhaler; Inhale 2 puffs into the lungs 4 (four) times daily.

## 2017-11-30 NOTE — TELEPHONE ENCOUNTER
----- Message from Margarita Overton sent at 11/30/2017  8:04 AM CST -----  Contact: Patient  Patient states that she would like to be seen today because she has a bad sore throat, congested and a bad cough.  There aren't any available appointments until tomorrow.  She stated that she can make it in late this afternoon.  She would have to know right away if she can be seen because she would have to tell her  to come and pick her up.   Please call her back at [phone].  Thank you

## 2017-12-05 DIAGNOSIS — I10 ESSENTIAL HYPERTENSION: ICD-10-CM

## 2017-12-05 NOTE — TELEPHONE ENCOUNTER
----- Message from Guillermina Osorio sent at 12/5/2017 11:47 AM CST -----  Contact: Vicky from [facility]  Calling to state blood pressure 160/90 , lost 2 lbs since taking Rx furosemide, still has swelling in feet, and lungs are clear. Please call [phone]. Thanks!

## 2017-12-06 RX ORDER — POTASSIUM CHLORIDE 750 MG/1
10 TABLET, EXTENDED RELEASE ORAL DAILY
Qty: 30 TABLET | Refills: 0 | Status: SHIPPED | OUTPATIENT
Start: 2017-12-06 | End: 2017-12-18 | Stop reason: HOSPADM

## 2017-12-06 RX ORDER — FUROSEMIDE 20 MG/1
20 TABLET ORAL DAILY
Qty: 90 TABLET | Refills: 1 | Status: SHIPPED | OUTPATIENT
Start: 2017-12-06 | End: 2018-01-26 | Stop reason: SDUPTHER

## 2017-12-06 NOTE — TELEPHONE ENCOUNTER
Orders phoned to Vicky with [facility] .  Understanding verbalized and orders were reverberated correctly.   She reports that patient is not currently taking potassium and will need an rx sent to [facility] in [facility]. Patient also needs an rx of furosemide sent to [facility] pharmacy for a 90 day supply.

## 2017-12-06 NOTE — TELEPHONE ENCOUNTER
Increase to lasix bid X 3 days then resume 20 mg qd   Be sure tat she is also taking potassium  Weight daily   If >3 lbs in 3 days or 5 lbs in 7 days seek ER

## 2017-12-15 ENCOUNTER — OUTPATIENT CASE MANAGEMENT (OUTPATIENT)
Dept: ADMINISTRATIVE | Facility: OTHER | Age: 52
End: 2017-12-15

## 2017-12-15 NOTE — PROGRESS NOTES
12/15/17- Called and spoke to patient. Patient is keeping a log with her blood pressure, weight and glucose daily. Home health is coming weekly and reviewing the log with the patient also. Pt./family verbalize importance of daily monitoring of her blood pressure, weight and blood glucose. Pt/ family verbalized sign and symptoms of hypertension.  Pt/family verbalize importance of medication adherence.  Pt/family verbalize importance  of maintaining follow up  with doctors. Went over upcoming  appointments with patient. Encouraged patient to replace carbohydrates with fruits and vegetables. Encouraged patient to  to create a well-balanced food plate. Went over general diabetic and hypertension education with patient. Will follow up with patient in two weeks.     · Encouraged Dietary Compliance.  · Recognized and provide educational material (LENNY).  · Reviewed eating/nutrition habits.  · Encouraged Medication Compliance

## 2017-12-16 ENCOUNTER — LAB VISIT (OUTPATIENT)
Dept: LAB | Facility: HOSPITAL | Age: 52
End: 2017-12-16
Attending: INTERNAL MEDICINE
Payer: MEDICARE

## 2017-12-16 DIAGNOSIS — D63.1 ANEMIA OF CHRONIC RENAL FAILURE: ICD-10-CM

## 2017-12-16 DIAGNOSIS — R94.4 NONSPECIFIC ABNORMAL RESULTS OF KIDNEY FUNCTION STUDY: ICD-10-CM

## 2017-12-16 DIAGNOSIS — N18.30 CHRONIC KIDNEY DISEASE, STAGE III (MODERATE): ICD-10-CM

## 2017-12-16 DIAGNOSIS — I12.9 HYPERTENSIVE KIDNEY DISEASE WITH CHRONIC KIDNEY DISEASE: ICD-10-CM

## 2017-12-16 DIAGNOSIS — R94.4 NONSPECIFIC ABNORMAL RESULTS OF KIDNEY FUNCTION STUDY: Primary | ICD-10-CM

## 2017-12-16 DIAGNOSIS — E11.22 TYPE 2 DIABETES MELLITUS WITH CHRONIC KIDNEY DISEASE: ICD-10-CM

## 2017-12-16 DIAGNOSIS — N18.9 ANEMIA OF CHRONIC RENAL FAILURE: ICD-10-CM

## 2017-12-16 LAB
ALBUMIN SERPL BCP-MCNC: 3.1 G/DL
ALP SERPL-CCNC: 89 U/L
ALT SERPL W/O P-5'-P-CCNC: 15 U/L
ANION GAP SERPL CALC-SCNC: 8 MMOL/L
AST SERPL-CCNC: 19 U/L
BASOPHILS # BLD AUTO: 0.06 K/UL
BASOPHILS NFR BLD: 0.7 %
BILIRUB SERPL-MCNC: 0.2 MG/DL
BUN SERPL-MCNC: 45 MG/DL
CALCIUM SERPL-MCNC: 9.9 MG/DL
CHLORIDE SERPL-SCNC: 107 MMOL/L
CO2 SERPL-SCNC: 22 MMOL/L
CREAT SERPL-MCNC: 3.3 MG/DL
DIFFERENTIAL METHOD: ABNORMAL
EOSINOPHIL # BLD AUTO: 0.2 K/UL
EOSINOPHIL NFR BLD: 2.7 %
ERYTHROCYTE [DISTWIDTH] IN BLOOD BY AUTOMATED COUNT: 13.3 %
EST. GFR  (AFRICAN AMERICAN): 17.7 ML/MIN/1.73 M^2
EST. GFR  (NON AFRICAN AMERICAN): 15.3 ML/MIN/1.73 M^2
FERRITIN SERPL-MCNC: 213 NG/ML
GLUCOSE SERPL-MCNC: 122 MG/DL
HCT VFR BLD AUTO: 37.6 %
HGB BLD-MCNC: 11.7 G/DL
IMM GRANULOCYTES # BLD AUTO: 0.05 K/UL
IMM GRANULOCYTES NFR BLD AUTO: 0.6 %
IRON SERPL-MCNC: 77 UG/DL
LYMPHOCYTES # BLD AUTO: 1.9 K/UL
LYMPHOCYTES NFR BLD: 23.1 %
MCH RBC QN AUTO: 26.5 PG
MCHC RBC AUTO-ENTMCNC: 31.1 G/DL
MCV RBC AUTO: 85 FL
MONOCYTES # BLD AUTO: 0.8 K/UL
MONOCYTES NFR BLD: 9.6 %
NEUTROPHILS # BLD AUTO: 5.1 K/UL
NEUTROPHILS NFR BLD: 63.3 %
NRBC BLD-RTO: 0 /100 WBC
PHOSPHATE SERPL-MCNC: 5.2 MG/DL
PLATELET # BLD AUTO: 345 K/UL
PMV BLD AUTO: 10.8 FL
POTASSIUM SERPL-SCNC: 7.4 MMOL/L
PROT SERPL-MCNC: 7.4 G/DL
PTH-INTACT SERPL-MCNC: 104 PG/ML
RBC # BLD AUTO: 4.41 M/UL
SATURATED IRON: 20 %
SODIUM SERPL-SCNC: 137 MMOL/L
TOTAL IRON BINDING CAPACITY: 389 UG/DL
TRANSFERRIN SERPL-MCNC: 263 MG/DL
WBC # BLD AUTO: 8.1 K/UL

## 2017-12-16 PROCEDURE — 84100 ASSAY OF PHOSPHORUS: CPT

## 2017-12-16 PROCEDURE — 85025 COMPLETE CBC W/AUTO DIFF WBC: CPT

## 2017-12-16 PROCEDURE — 36415 COLL VENOUS BLD VENIPUNCTURE: CPT | Mod: PO

## 2017-12-16 PROCEDURE — 80053 COMPREHEN METABOLIC PANEL: CPT

## 2017-12-16 PROCEDURE — 82728 ASSAY OF FERRITIN: CPT

## 2017-12-16 PROCEDURE — 83970 ASSAY OF PARATHORMONE: CPT

## 2017-12-16 PROCEDURE — 83540 ASSAY OF IRON: CPT

## 2017-12-18 ENCOUNTER — HOSPITAL ENCOUNTER (EMERGENCY)
Facility: HOSPITAL | Age: 52
Discharge: HOME | End: 2017-12-19
Attending: EMERGENCY MEDICINE | Admitting: HOSPITALIST
Payer: MEDICARE

## 2017-12-18 VITALS
BODY MASS INDEX: 29.67 KG/M2 | HEART RATE: 62 BPM | TEMPERATURE: 98 F | WEIGHT: 195.75 LBS | RESPIRATION RATE: 14 BRPM | HEIGHT: 68 IN | DIASTOLIC BLOOD PRESSURE: 56 MMHG | OXYGEN SATURATION: 97 % | SYSTOLIC BLOOD PRESSURE: 105 MMHG

## 2017-12-18 DIAGNOSIS — E87.5 HYPERKALEMIA: ICD-10-CM

## 2017-12-18 LAB
ALBUMIN SERPL BCP-MCNC: 3.3 G/DL
ALP SERPL-CCNC: 78 U/L
ALT SERPL W/O P-5'-P-CCNC: 15 U/L
ANION GAP SERPL CALC-SCNC: 9 MMOL/L
AST SERPL-CCNC: 17 U/L
BASOPHILS # BLD AUTO: 0.1 K/UL
BASOPHILS NFR BLD: 1.6 %
BILIRUB SERPL-MCNC: 0.3 MG/DL
BUN SERPL-MCNC: 43 MG/DL
CALCIUM SERPL-MCNC: 10 MG/DL
CHLORIDE SERPL-SCNC: 107 MMOL/L
CO2 SERPL-SCNC: 18 MMOL/L
CREAT SERPL-MCNC: 2.6 MG/DL
DIFFERENTIAL METHOD: ABNORMAL
EOSINOPHIL # BLD AUTO: 0.1 K/UL
EOSINOPHIL NFR BLD: 1.5 %
ERYTHROCYTE [DISTWIDTH] IN BLOOD BY AUTOMATED COUNT: 13.6 %
EST. GFR  (AFRICAN AMERICAN): 24 ML/MIN/1.73 M^2
EST. GFR  (NON AFRICAN AMERICAN): 20 ML/MIN/1.73 M^2
GLUCOSE SERPL-MCNC: 226 MG/DL
HCT VFR BLD AUTO: 35.9 %
HGB BLD-MCNC: 11.9 G/DL
LYMPHOCYTES # BLD AUTO: 1.8 K/UL
LYMPHOCYTES NFR BLD: 21.4 %
MCH RBC QN AUTO: 26.9 PG
MCHC RBC AUTO-ENTMCNC: 33.3 G/DL
MCV RBC AUTO: 81 FL
MONOCYTES # BLD AUTO: 0.6 K/UL
MONOCYTES NFR BLD: 7.6 %
NEUTROPHILS # BLD AUTO: 5.7 K/UL
NEUTROPHILS NFR BLD: 67.9 %
PLATELET # BLD AUTO: 277 K/UL
PMV BLD AUTO: 9.4 FL
POTASSIUM SERPL-SCNC: 6.4 MMOL/L
PROT SERPL-MCNC: 7.7 G/DL
RBC # BLD AUTO: 4.45 M/UL
SODIUM SERPL-SCNC: 134 MMOL/L
WBC # BLD AUTO: 8.4 K/UL

## 2017-12-18 PROCEDURE — 63600175 PHARM REV CODE 636 W HCPCS: Performed by: EMERGENCY MEDICINE

## 2017-12-18 PROCEDURE — 99284 EMERGENCY DEPT VISIT MOD MDM: CPT | Mod: 25

## 2017-12-18 PROCEDURE — 80053 COMPREHEN METABOLIC PANEL: CPT

## 2017-12-18 PROCEDURE — 36415 COLL VENOUS BLD VENIPUNCTURE: CPT

## 2017-12-18 PROCEDURE — 96374 THER/PROPH/DIAG INJ IV PUSH: CPT

## 2017-12-18 PROCEDURE — 12000002 HC ACUTE/MED SURGE SEMI-PRIVATE ROOM

## 2017-12-18 PROCEDURE — 25000003 PHARM REV CODE 250: Performed by: EMERGENCY MEDICINE

## 2017-12-18 PROCEDURE — 93010 ELECTROCARDIOGRAM REPORT: CPT | Mod: ,,, | Performed by: INTERNAL MEDICINE

## 2017-12-18 PROCEDURE — 85025 COMPLETE CBC W/AUTO DIFF WBC: CPT

## 2017-12-18 PROCEDURE — 93005 ELECTROCARDIOGRAM TRACING: CPT

## 2017-12-18 RX ORDER — METFORMIN HYDROCHLORIDE 500 MG/1
1000 TABLET ORAL 2 TIMES DAILY WITH MEALS
Status: CANCELLED | OUTPATIENT
Start: 2017-12-18

## 2017-12-18 RX ORDER — SODIUM CHLORIDE 9 MG/ML
INJECTION, SOLUTION INTRAVENOUS CONTINUOUS
Status: CANCELLED | OUTPATIENT
Start: 2017-12-18

## 2017-12-18 RX ORDER — ALPRAZOLAM 1 MG/1
1 TABLET ORAL
Status: COMPLETED | OUTPATIENT
Start: 2017-12-18 | End: 2017-12-18

## 2017-12-18 RX ORDER — FUROSEMIDE 20 MG/1
20 TABLET ORAL 2 TIMES DAILY
Status: CANCELLED | OUTPATIENT
Start: 2017-12-18

## 2017-12-18 RX ORDER — CLONIDINE HYDROCHLORIDE 0.1 MG/1
0.1 TABLET ORAL
Status: COMPLETED | OUTPATIENT
Start: 2017-12-18 | End: 2017-12-18

## 2017-12-18 RX ORDER — ESCITALOPRAM OXALATE 10 MG/1
20 TABLET ORAL DAILY
Status: CANCELLED | OUTPATIENT
Start: 2017-12-18

## 2017-12-18 RX ORDER — HYDRALAZINE HYDROCHLORIDE 20 MG/ML
20 INJECTION INTRAMUSCULAR; INTRAVENOUS
Status: COMPLETED | OUTPATIENT
Start: 2017-12-18 | End: 2017-12-18

## 2017-12-18 RX ORDER — IBUPROFEN 200 MG
24 TABLET ORAL
Status: CANCELLED | OUTPATIENT
Start: 2017-12-18

## 2017-12-18 RX ORDER — METOPROLOL TARTRATE 25 MG/1
25 TABLET, FILM COATED ORAL 2 TIMES DAILY
Status: CANCELLED | OUTPATIENT
Start: 2017-12-18

## 2017-12-18 RX ORDER — IBUPROFEN 200 MG
16 TABLET ORAL
Status: CANCELLED | OUTPATIENT
Start: 2017-12-18

## 2017-12-18 RX ORDER — NIFEDIPINE 60 MG/1
60 TABLET, EXTENDED RELEASE ORAL DAILY
Status: CANCELLED | OUTPATIENT
Start: 2017-12-18

## 2017-12-18 RX ORDER — GLUCAGON 1 MG
1 KIT INJECTION
Status: CANCELLED | OUTPATIENT
Start: 2017-12-18

## 2017-12-18 RX ORDER — ACETAMINOPHEN 325 MG/1
650 TABLET ORAL EVERY 6 HOURS PRN
Status: CANCELLED | OUTPATIENT
Start: 2017-12-18

## 2017-12-18 RX ORDER — ONDANSETRON 2 MG/ML
4 INJECTION INTRAMUSCULAR; INTRAVENOUS EVERY 8 HOURS PRN
Status: CANCELLED | OUTPATIENT
Start: 2017-12-18

## 2017-12-18 RX ORDER — INSULIN ASPART 100 [IU]/ML
0-5 INJECTION, SOLUTION INTRAVENOUS; SUBCUTANEOUS
Status: CANCELLED | OUTPATIENT
Start: 2017-12-18

## 2017-12-18 RX ORDER — ATORVASTATIN CALCIUM 40 MG/1
80 TABLET, FILM COATED ORAL DAILY
Status: CANCELLED | OUTPATIENT
Start: 2017-12-18

## 2017-12-18 RX ORDER — FENOFIBRATE 160 MG/1
160 TABLET ORAL DAILY
Status: CANCELLED | OUTPATIENT
Start: 2017-12-18

## 2017-12-18 RX ORDER — MONTELUKAST SODIUM 10 MG/1
10 TABLET ORAL NIGHTLY
Status: CANCELLED | OUTPATIENT
Start: 2017-12-18

## 2017-12-18 RX ADMIN — ALPRAZOLAM 1 MG: 1 TABLET ORAL at 12:12

## 2017-12-18 RX ADMIN — SODIUM POLYSTYRENE SULFONATE 30 G: 15 SUSPENSION ORAL; RECTAL at 01:12

## 2017-12-18 RX ADMIN — CLONIDINE HYDROCHLORIDE 0.1 MG: 0.1 TABLET ORAL at 12:12

## 2017-12-18 RX ADMIN — HYDRALAZINE HYDROCHLORIDE 20 MG: 20 INJECTION INTRAMUSCULAR; INTRAVENOUS at 12:12

## 2017-12-18 NOTE — PROGRESS NOTES
Spoke with morris with [facility] regarding rx for blood draw in am.. Order faxed to [phone]. Information given to patient and family.

## 2017-12-18 NOTE — ED PROVIDER NOTES
Encounter Date: 12/18/2017    SCRIBE #1 NOTE: Sindhu ARCOS, am scribing for, and in the presence of, Dr. Aj.       History     Chief Complaint   Patient presents with    Hyperkalemia     K+7.4 on 12/16/17 12/18/2017 12:18 PM     Chief complaint: High potassium lab result      Leanna García is a 52 y.o. female who presents to the ED per instructions from her nephrologist, Dr. Carlson following lab results from blood and urine taken on 11/16/2017 that revealed a potassium level of 7.3. She was prescribed potassium supplements about a wk ago, and was instructed to discontinue them today. The patient reports that she took her blood pressure medication this morning, and denies all symptoms at this time, including nausea, vomiting, palpitations, dizziness, decreased urination, and weakness. The patient's PMHx includes HTN, DM, HLD, and A-fib, and stroke. She reports no history of CHF. There is no pertinent SHx noted.      The history is provided by the patient and the spouse.     Review of patient's allergies indicates:   Allergen Reactions    Dilaudid [hydromorphone (bulk)] Nausea And Vomiting     Severe GI upset    Lortab [hydrocodone-acetaminophen] Itching     Past Medical History:   Diagnosis Date    A-fib     resolved per patient    Arthritis     Bronchitis     Cardiovascular event risk, ASCVD 10-year risk 6.7% 10/15/2016    Diabetes mellitus     Diabetes mellitus type II     Encounter for blood transfusion     History of colon polyps 11/2/2016    Hyperlipidemia     Hypertension     Stroke      Past Surgical History:   Procedure Laterality Date    COLONOSCOPY N/A 10/5/2016    Procedure: COLONOSCOPY;  Surgeon: Pillo Chnael MD;  Location: [facility];  Service: Endoscopy;  Laterality: N/A;    HERNIA REPAIR Bilateral 11/22/2016    inguinal    HYSTERECTOMY       Family History   Problem Relation Age of Onset    Hyperlipidemia Mother     Hypertension Mother     Hypertension Father      Diabetes Father     Diabetes Sister     Diabetes Sister     Diabetes Maternal Aunt     Diabetes Maternal Grandmother     Heart disease Maternal Grandmother     Cancer Paternal Grandmother      Social History   Substance Use Topics    Smoking status: Never Smoker    Smokeless tobacco: Never Used    Alcohol use No     Review of Systems   Constitutional: Negative for activity change, appetite change, chills, fatigue and fever.        Positive for high potassium levels in lab results   Eyes: Negative for visual disturbance.   Respiratory: Negative for apnea and shortness of breath.    Cardiovascular: Negative for chest pain and palpitations.   Gastrointestinal: Negative for abdominal distention, abdominal pain, nausea and vomiting.   Genitourinary: Negative for decreased urine volume and difficulty urinating.   Musculoskeletal: Negative for neck pain.   Skin: Negative for pallor and rash.   Neurological: Negative for dizziness, weakness and headaches.   Hematological: Does not bruise/bleed easily.   Psychiatric/Behavioral: Negative for agitation.       Physical Exam     Initial Vitals [12/18/17 1207]   BP Pulse Resp Temp SpO2   (!) 250/112 63 14 97.9 °F (36.6 °C) 99 %      MAP       158         Physical Exam    Nursing note and vitals reviewed.  Constitutional: She appears well-developed and well-nourished.   HENT:   Head: Normocephalic and atraumatic.   Eyes: Conjunctivae are normal.   Neck: Normal range of motion. Neck supple.   Cardiovascular: Normal rate, regular rhythm and normal heart sounds. Exam reveals no gallop and no friction rub.    No murmur heard.  Pulmonary/Chest: Effort normal and breath sounds normal. No respiratory distress. She has no wheezes. She has no rhonchi. She has no rales.   Abdominal: Soft. She exhibits no distension. There is no tenderness.   Musculoskeletal: Normal range of motion.   Neurological: She is alert and oriented to person, place, and time.   Skin: Skin is warm and dry.  No erythema.   Psychiatric: She has a normal mood and affect.         ED Course   Procedures  Labs Reviewed   COMPREHENSIVE METABOLIC PANEL - Abnormal; Notable for the following:        Result Value    Sodium 134 (*)     Potassium 6.4 (*)     CO2 18 (*)     Glucose 226 (*)     BUN, Bld 43 (*)     Creatinine 2.6 (*)     Albumin 3.3 (*)     eGFR if  24 (*)     eGFR if non  20 (*)     All other components within normal limits    Narrative:      K critical result(s) called and verbal readback obtained from Dasha Adan RN, 12/18/2017 13:35   CBC W/ AUTO DIFFERENTIAL - Abnormal; Notable for the following:     Hemoglobin 11.9 (*)     Hematocrit 35.9 (*)     MCV 81 (*)     MCH 26.9 (*)     All other components within normal limits             Medical Decision Making:   History:   Old Medical Records: I decided to obtain old medical records.  Clinical Tests:   Lab Tests: Ordered and Reviewed  Medical Tests: Ordered and Reviewed  ED Management:  Leanna García is a 52 y.o. female who presents with  asymptomatic hyperkalemia discovered on surveillance labs 2 days ago.  She is on potassium supplements, and ARB and spironolactone.  Repeat potassium is 6.4 with no acute EKG changes.  After discussion with Dr. Snyder and Dr. Carlson recommendation is made for discharge with discontinuation of potassium supplement ARB and spironolactone.  Repeat labs as scheduled in 2 days.       APC / Resident Notes:   I, Dr. Addy Aj III, personally performed the services described in this documentation. All medical record entries made by the scribe were at my direction and in my presence.  I have reviewed the chart and agree that the record reflects my personal performance and is accurate and complete. Addy Aj III, MD.  5:53 PM 12/18/2017       Scribe Attestation:   Scribe #1: I performed the above scribed service and the documentation accurately describes the services I performed. I attest to  the accuracy of the note.            ED Course      Clinical Impression:   The encounter diagnosis was Hyperkalemia.    Disposition:   Disposition: Discharged  Condition: Stable                        Addy Aj III, MD  12/18/17 4500

## 2017-12-18 NOTE — PROGRESS NOTES
Consult Note  Hospital Medicine    Consult Requested By: Addy Aj III, MD      SUBJECTIVE:     History of Present Illness: Patient is a 52-year-old  female with a past medical history significant for CKD 3, hypertension, type 2 diabetes who presents to the hospital after routine lab check revealed elevated potassium.  Patient was taking oral potassium supplements for approximately 3 weeks and had routine lab work done on Saturday which revealed potassium level of 7.4.  During that point in time, the patient's nephrologist recommended the patient go to the emergency department.  The patient declined going to the ED but did stop her potassium supplementation and has showed up to the emergency department this morning.  This a.m., repeat potassium shows a level of 6.  Patient remains asymptomatic and is otherwise doing well.    Past Medical History:   Diagnosis Date    A-fib     resolved per patient    Arthritis     Bronchitis     Cardiovascular event risk, ASCVD 10-year risk 6.7% 10/15/2016    Diabetes mellitus     Diabetes mellitus type II     Encounter for blood transfusion     History of colon polyps 11/2/2016    Hyperlipidemia     Hypertension     Stroke      Past Surgical History:   Procedure Laterality Date    COLONOSCOPY N/A 10/5/2016    Procedure: COLONOSCOPY;  Surgeon: Pillo Chanel MD;  Location: [facility];  Service: Endoscopy;  Laterality: N/A;    HERNIA REPAIR Bilateral 11/22/2016    inguinal    HYSTERECTOMY       Family History   Problem Relation Age of Onset    Hyperlipidemia Mother     Hypertension Mother     Hypertension Father     Diabetes Father     Diabetes Sister     Diabetes Sister     Diabetes Maternal Aunt     Diabetes Maternal Grandmother     Heart disease Maternal Grandmother     Cancer Paternal Grandmother      Social History   Substance Use Topics    Smoking status: Never Smoker    Smokeless tobacco: Never Used    Alcohol use No       Review of  patient's allergies indicates:   Allergen Reactions    Dilaudid [hydromorphone (bulk)] Nausea And Vomiting     Severe GI upset    Lortab [hydrocodone-acetaminophen] Itching        Review of Systems   Constitutional: Negative for chills and fever.   HENT: Negative for tinnitus.    Eyes: Negative for blurred vision and photophobia.   Respiratory: Negative for cough and sputum production.    Cardiovascular: Negative for chest pain and orthopnea.   Gastrointestinal: Negative for diarrhea and vomiting.   Genitourinary: Negative for frequency and urgency.   Musculoskeletal: Negative for myalgias and neck pain.   Skin: Negative for itching and rash.   Neurological: Negative for tingling and headaches.   Endo/Heme/Allergies: Negative for environmental allergies.           OBJECTIVE:     Vital Signs Range (Last 24H):  Temp:  [97.9 °F (36.6 °C)]   Pulse:  [56-68]   Resp:  [14]   BP: (105-252)/()   SpO2:  [97 %-99 %]     Physical Exam   Constitutional: She is oriented to person, place, and time and well-developed, well-nourished, and in no distress. No distress.   HENT:   Head: Normocephalic and atraumatic.   Mouth/Throat: No oropharyngeal exudate.   Eyes: Pupils are equal, round, and reactive to light. Right eye exhibits no discharge. Left eye exhibits no discharge. No scleral icterus.   Neck: Normal range of motion.   Cardiovascular: Normal rate, regular rhythm and intact distal pulses.  Exam reveals no gallop and no friction rub.    No murmur heard.  Pulmonary/Chest: Effort normal and breath sounds normal. No stridor. No respiratory distress. She has no wheezes.   Abdominal: Soft. Bowel sounds are normal. She exhibits no distension. There is no tenderness.   Musculoskeletal: Normal range of motion. She exhibits no edema or tenderness.   Lymphadenopathy:     She has no cervical adenopathy.   Neurological: She is alert and oriented to person, place, and time. She has normal reflexes. No cranial nerve deficit. She  exhibits normal muscle tone. Gait normal.   Skin: No rash noted. She is not diaphoretic. No erythema. No pallor.   Psychiatric: Affect and judgment normal.   Nursing note and vitals reviewed.      Body mass index is 29.77 kg/m².    Laboratory:  CBC:   Recent Labs  Lab 12/18/17  1229   WBC 8.40   RBC 4.45   HGB 11.9*   HCT 35.9*      MCV 81*   MCH 26.9*   MCHC 33.3     BMP:   Recent Labs  Lab 12/18/17  1229   *   *   K 6.4*      CO2 18*   BUN 43*   CREATININE 2.6*   CALCIUM 10.0         Diagnostic Results:  Labs: Reviewed  ECG: Reviewed  .    EKG: Normal, no peaked T waves noted.    ASSESSMENT/PLAN:     Active Hospital Problems    Diagnosis  POA    Hyperkalemia [E87.5] Hyperkalemia, decreased renal excretion-patient with hyperkalemia likely related to decreased renal excretion as well as exogenous potassium supplementation.  Potassium appears to be much better.  EKG shows no changes which may be suggestive of cardiac toxicity.  Patient appears to be stable at the moment.  I discussed the case with the patient's nephrologist in detail and he is suggested the patient received 1 dose of Kayexalate in the emergency department and to stop her Aldactone and ACE inhibitor and that the patient will repeat a potassium done by home health tomorrow morning.  If the potassium is abnormal will continue the Kayexalate.  The patient already has a follow-up appointment with her nephrologist on Thursday.  Yes      Resolved Hospital Problems    Diagnosis Date Resolved POA   No resolved problems to display.       Thank you for allowing us to participate in the care of your patient. We will follow the patient and provide recommendations as needed.      Time spent seeing patient( greater than 1/2 spent in direct contact) : 42 min

## 2017-12-23 VITALS
RESPIRATION RATE: 16 BRPM | DIASTOLIC BLOOD PRESSURE: 91 MMHG | BODY MASS INDEX: 27.43 KG/M2 | TEMPERATURE: 99 F | OXYGEN SATURATION: 96 % | HEART RATE: 76 BPM | SYSTOLIC BLOOD PRESSURE: 192 MMHG | WEIGHT: 181 LBS | HEIGHT: 68 IN

## 2017-12-23 PROCEDURE — 99284 EMERGENCY DEPT VISIT MOD MDM: CPT

## 2017-12-24 ENCOUNTER — HOSPITAL ENCOUNTER (EMERGENCY)
Facility: HOSPITAL | Age: 52
Discharge: HOME | End: 2017-12-24
Attending: EMERGENCY MEDICINE
Payer: MEDICARE

## 2017-12-24 DIAGNOSIS — R05.9 COUGH: ICD-10-CM

## 2017-12-24 DIAGNOSIS — J10.1 INFLUENZA A: Primary | ICD-10-CM

## 2017-12-24 DIAGNOSIS — J40 BRONCHITIS: ICD-10-CM

## 2017-12-24 LAB
ALBUMIN SERPL BCP-MCNC: 3.4 G/DL
ALP SERPL-CCNC: 69 U/L
ALT SERPL W/O P-5'-P-CCNC: 14 U/L
AMORPH CRY URNS QL MICRO: ABNORMAL
ANION GAP SERPL CALC-SCNC: 10 MMOL/L
AST SERPL-CCNC: 21 U/L
BACTERIA #/AREA URNS HPF: ABNORMAL /HPF
BASOPHILS # BLD AUTO: 0 K/UL
BASOPHILS NFR BLD: 0.6 %
BILIRUB SERPL-MCNC: 0.4 MG/DL
BILIRUB UR QL STRIP: NEGATIVE
BUN SERPL-MCNC: 49 MG/DL
CALCIUM SERPL-MCNC: 9.4 MG/DL
CHLORIDE SERPL-SCNC: 106 MMOL/L
CLARITY UR: ABNORMAL
CO2 SERPL-SCNC: 22 MMOL/L
COLOR UR: YELLOW
CREAT SERPL-MCNC: 2.6 MG/DL
DIFFERENTIAL METHOD: ABNORMAL
EOSINOPHIL # BLD AUTO: 0.2 K/UL
EOSINOPHIL NFR BLD: 3.3 %
ERYTHROCYTE [DISTWIDTH] IN BLOOD BY AUTOMATED COUNT: 13.4 %
EST. GFR  (AFRICAN AMERICAN): 24 ML/MIN/1.73 M^2
EST. GFR  (NON AFRICAN AMERICAN): 20 ML/MIN/1.73 M^2
FLUAV AG SPEC QL IA: POSITIVE
FLUBV AG SPEC QL IA: NEGATIVE
GLUCOSE SERPL-MCNC: 193 MG/DL
GLUCOSE UR QL STRIP: ABNORMAL
GRAN CASTS #/AREA URNS LPF: 8 /LPF
HCT VFR BLD AUTO: 32 %
HGB BLD-MCNC: 10.8 G/DL
HGB UR QL STRIP: ABNORMAL
HYALINE CASTS #/AREA URNS LPF: 0 /LPF
KETONES UR QL STRIP: NEGATIVE
LEUKOCYTE ESTERASE UR QL STRIP: NEGATIVE
LYMPHOCYTES # BLD AUTO: 0.8 K/UL
LYMPHOCYTES NFR BLD: 10.7 %
MCH RBC QN AUTO: 26.7 PG
MCHC RBC AUTO-ENTMCNC: 33.7 G/DL
MCV RBC AUTO: 79 FL
MICROSCOPIC COMMENT: ABNORMAL
MONOCYTES # BLD AUTO: 1.1 K/UL
MONOCYTES NFR BLD: 15.5 %
NEUTROPHILS # BLD AUTO: 5 K/UL
NEUTROPHILS NFR BLD: 69.9 %
NITRITE UR QL STRIP: NEGATIVE
PH UR STRIP: 6 [PH] (ref 5–8)
PLATELET # BLD AUTO: 277 K/UL
PMV BLD AUTO: 8.4 FL
POTASSIUM SERPL-SCNC: 5.1 MMOL/L
PROT SERPL-MCNC: 7.6 G/DL
PROT UR QL STRIP: ABNORMAL
RBC # BLD AUTO: 4.04 M/UL
RBC #/AREA URNS HPF: 4 /HPF (ref 0–4)
SODIUM SERPL-SCNC: 138 MMOL/L
SP GR UR STRIP: 1.02 (ref 1–1.03)
SPECIMEN SOURCE: ABNORMAL
SQUAMOUS #/AREA URNS HPF: 9 /HPF
URN SPEC COLLECT METH UR: ABNORMAL
UROBILINOGEN UR STRIP-ACNC: NEGATIVE EU/DL
WBC # BLD AUTO: 7.1 K/UL
WBC #/AREA URNS HPF: 12 /HPF (ref 0–5)
YEAST URNS QL MICRO: ABNORMAL

## 2017-12-24 PROCEDURE — 63600175 PHARM REV CODE 636 W HCPCS: Performed by: EMERGENCY MEDICINE

## 2017-12-24 PROCEDURE — 80053 COMPREHEN METABOLIC PANEL: CPT

## 2017-12-24 PROCEDURE — 85025 COMPLETE CBC W/AUTO DIFF WBC: CPT

## 2017-12-24 PROCEDURE — 87400 INFLUENZA A/B EACH AG IA: CPT

## 2017-12-24 PROCEDURE — 81000 URINALYSIS NONAUTO W/SCOPE: CPT

## 2017-12-24 PROCEDURE — 36415 COLL VENOUS BLD VENIPUNCTURE: CPT

## 2017-12-24 PROCEDURE — 25000003 PHARM REV CODE 250: Performed by: EMERGENCY MEDICINE

## 2017-12-24 RX ORDER — BETAMETHASONE SODIUM PHOSPHATE AND BETAMETHASONE ACETATE 3; 3 MG/ML; MG/ML
6 INJECTION, SUSPENSION INTRA-ARTICULAR; INTRALESIONAL; INTRAMUSCULAR; SOFT TISSUE
Status: DISCONTINUED | OUTPATIENT
Start: 2017-12-24 | End: 2017-12-24

## 2017-12-24 RX ORDER — ONDANSETRON 4 MG/1
4 TABLET, ORALLY DISINTEGRATING ORAL
Status: COMPLETED | OUTPATIENT
Start: 2017-12-24 | End: 2017-12-24

## 2017-12-24 RX ADMIN — ONDANSETRON 4 MG: 4 TABLET, ORALLY DISINTEGRATING ORAL at 01:12

## 2017-12-24 NOTE — ED NOTES
Patient identifiers for Leanna García checked and correct.  LOC: Patient is awake, alert, and aware of environment with an appropriate affect. Patient is oriented x 3 and speaking appropriately.  APPEARANCE: Patient resting comfortably and in no acute distress. Patient is clean and well groomed, patient's clothing is properly fastened.  SKIN: The skin is warm and dry. Patient has normal skin turgor and moist mucus membrances. Skin is intact; no bruising or breakdown noted.  MUSCULOSKELETAL: Patient is moving all extremities well, no obvious deformities noted. Pulses intact. C/o body aches  RESPIRATORY: Airway is open and patent. Respirations are spontaneous and non-labored with normal effort and rate.  CARDIAC: Patient has a normal rate and rhythm. No peripheral edema noted. Capillary refill < 3 seconds.  ABDOMEN: No distention noted. Bowel sounds active in all 4 quadrants. Soft and non-tender upon palpation.  NEUROLOGICAL: PERRL. Facial expression is symmetrical. Hand grasps are equal bilaterally. Normal sensation in all extremities when touched with finger.  Allergies reported:   Review of patient's allergies indicates:   Allergen Reactions    Dilaudid [hydromorphone (bulk)] Nausea And Vomiting     Severe GI upset    Lortab [hydrocodone-acetaminophen] Itching

## 2017-12-24 NOTE — ED PROVIDER NOTES
Encounter Date: 12/23/2017       History     Chief Complaint   Patient presents with    Cough     X 3 months     Chills    Hypertension     This patient is a 52-year-old female with a past history of bronchitis, atrial fibrillation, diabetes mellitus, chronic kidney disease, hypertension, hyperlipidemia presenting to the emergency Department with complaints of acute chills over the past few days with acute worsening of chronic cough that is nonproductive.  She is also endorsing worsening hypertension since onset of the symptoms and her doctor discontinued 2 of her 4 antihypertensive medications.  She denies confusion, neck stiffness, chest pain, nausea, vomiting, abdominal pain, dysuria.  She does report 1 sick contact and her significant other.  She denies providing anything for her symptoms prior to arrival.          Review of patient's allergies indicates:   Allergen Reactions    Dilaudid [hydromorphone (bulk)] Nausea And Vomiting     Severe GI upset    Lortab [hydrocodone-acetaminophen] Itching     Past Medical History:   Diagnosis Date    A-fib     resolved per patient    Arthritis     Bronchitis     Cardiovascular event risk, ASCVD 10-year risk 6.7% 10/15/2016    Diabetes mellitus     Diabetes mellitus type II     Encounter for blood transfusion     History of colon polyps 11/2/2016    Hyperlipidemia     Hypertension     Stroke      Past Surgical History:   Procedure Laterality Date    COLONOSCOPY N/A 10/5/2016    Procedure: COLONOSCOPY;  Surgeon: Pillo Chanel MD;  Location: [facility];  Service: Endoscopy;  Laterality: N/A;    HERNIA REPAIR Bilateral 11/22/2016    inguinal    HYSTERECTOMY       Family History   Problem Relation Age of Onset    Hyperlipidemia Mother     Hypertension Mother     Hypertension Father     Diabetes Father     Diabetes Sister     Diabetes Sister     Diabetes Maternal Aunt     Diabetes Maternal Grandmother     Heart disease Maternal Grandmother      Cancer Paternal Grandmother      Social History   Substance Use Topics    Smoking status: Never Smoker    Smokeless tobacco: Never Used    Alcohol use No     Review of Systems   Constitutional: Positive for chills.   HENT: Positive for congestion.    Eyes: Negative for visual disturbance.   Respiratory: Positive for cough.    Cardiovascular: Negative for chest pain and leg swelling.   Gastrointestinal: Negative for vomiting.   Genitourinary: Negative for dysuria.   Musculoskeletal: Negative for back pain.   Skin: Negative for rash.   Hematological: Does not bruise/bleed easily.   Psychiatric/Behavioral: Negative for confusion.       Physical Exam     Initial Vitals [12/23/17 2245]   BP Pulse Resp Temp SpO2   (!) 192/91 76 16 99.4 °F (37.4 °C) 96 %      MAP       124.67         Physical Exam    Nursing note and vitals reviewed.  Constitutional: She appears well-nourished. No distress.   HENT:   Head: Normocephalic and atraumatic.   Mouth/Throat: Oropharynx is clear and moist.   Sounds of upper airway congestion   Eyes: Conjunctivae and EOM are normal.   Neck: Normal range of motion. Neck supple.   Cardiovascular: Normal rate and regular rhythm.   Pulmonary/Chest: No respiratory distress. She has no wheezes. She has no rales.   Abdominal: She exhibits no distension. There is no tenderness.   Musculoskeletal: She exhibits no edema or tenderness.   Neurological: She is alert and oriented to person, place, and time.   Skin: Skin is warm and dry.   Psychiatric: She has a normal mood and affect. Thought content normal.         ED Course   Procedures  Labs Reviewed   INFLUENZA A AND B ANTIGEN - Abnormal; Notable for the following:        Result Value    Influenza A Ag, EIA Positive (*)     All other components within normal limits   CBC W/ AUTO DIFFERENTIAL - Abnormal; Notable for the following:     Hemoglobin 10.8 (*)     Hematocrit 32.0 (*)     MCV 79 (*)     MCH 26.7 (*)     MPV 8.4 (*)     Lymph # 0.8 (*)     Mono #  1.1 (*)     Lymph% 10.7 (*)     Mono% 15.5 (*)     All other components within normal limits   COMPREHENSIVE METABOLIC PANEL - Abnormal; Notable for the following:     CO2 22 (*)     Glucose 193 (*)     BUN, Bld 49 (*)     Creatinine 2.6 (*)     Albumin 3.4 (*)     eGFR if  24 (*)     eGFR if non  20 (*)     All other components within normal limits   URINALYSIS - Abnormal; Notable for the following:     Appearance, UA Cloudy (*)     Protein, UA 3+ (*)     Glucose, UA 1+ (*)     Occult Blood UA 2+ (*)     All other components within normal limits   URINALYSIS MICROSCOPIC - Abnormal; Notable for the following:     WBC, UA 12 (*)     Bacteria, UA Few (*)     Yeast, UA Occasional (*)     Granular Casts, UA 8 (*)     All other components within normal limits             Medical Decision Making:   Initial Assessment:   This patient was interviewed and examined in the presence of her  and is no acute distress.  She does not appear acutely toxic.  Vital signs are stable with the exception of mild hypertension.  Screening labs, including flu swabs and a chest x-ray will be obtained to assess for evidence of systemic infection.  Patient was provided Zofran with symptomatic improvement.  ED Management:  Workup today significant for evidence of an influenza A positive probe.  Baseline chronic kidney disease is recreated without acute dehydration.  Chest x-ray reveals no focal infiltrate.  Chronic CBC findings are noted.  Urinalysis does not indicate evidence of progressing infection or dehydration.  On reassessment, the patient is educated about supportive care for influenza infection.  She is asked to adhere to continue blood pressure medication regimen until after the infection has passed and then to follow-up regarding any ongoing hypertension.  She is asked to return to the ER in the interim for any new, concerning, or worsening symptoms.  The patient was agreeable with this plan for  follow-up and she was discharged in stable condition.                   ED Course      Clinical Impression:   The primary encounter diagnosis was Influenza A. Diagnoses of Cough and Bronchitis were also pertinent to this visit.    Disposition:   Disposition: Discharged  Condition: Stable                        Davi Pradhan MD  12/25/17 0637

## 2017-12-26 ENCOUNTER — TELEPHONE (OUTPATIENT)
Dept: FAMILY MEDICINE | Facility: CLINIC | Age: 52
End: 2017-12-26

## 2017-12-26 DIAGNOSIS — J06.9 UPPER RESPIRATORY TRACT INFECTION, UNSPECIFIED TYPE: Primary | ICD-10-CM

## 2017-12-26 RX ORDER — BENZONATATE 100 MG/1
100 CAPSULE ORAL 3 TIMES DAILY PRN
Qty: 90 CAPSULE | Refills: 1 | Status: SHIPPED | OUTPATIENT
Start: 2017-12-26 | End: 2018-01-05

## 2017-12-26 NOTE — TELEPHONE ENCOUNTER
Spoke to patient's .IIC document in chart. Patient was seen in ER on 12/24/17. Tested positive for influenza A. No meds were prescribed for patient at that time. Could not have a steroid shot due to diabetes.  Patient has a constant cough sometimes producing thick yellow mucus. Throat is sore from coughing. Requestin medication to help with cough. Please advise

## 2017-12-26 NOTE — TELEPHONE ENCOUNTER
----- Message from Leilani Manpreet sent at 12/26/2017 11:03 AM CST -----  Contact: Sister in law Guillermina Dyer [phone]  She was seen in the ER on 12/24 and diagnosed with the flu.  They would prescribe medication due to low kidney function.  She was sent home without any medication to help her cough, body aches, fever and loss of voice.  She contacted the patient's kidney doctor office, but he is out of town.  They told her to contact her PCP.  Please call to let her know what you may be able to do for her.  Thank you!

## 2017-12-28 ENCOUNTER — OUTPATIENT CASE MANAGEMENT (OUTPATIENT)
Dept: ADMINISTRATIVE | Facility: OTHER | Age: 52
End: 2017-12-28

## 2017-12-28 NOTE — PROGRESS NOTES
12/28/17- Attempted to follow up with patient for outpatient case management. No answer and unable to leave a message.

## 2018-01-03 ENCOUNTER — OUTPATIENT CASE MANAGEMENT (OUTPATIENT)
Dept: ADMINISTRATIVE | Facility: OTHER | Age: 53
End: 2018-01-03

## 2018-01-03 NOTE — PROGRESS NOTES
01/03/18- Called and spoke to patient. Patient is coughing while she is on the phone with laryngitis. Patient went to the ER on 12/24/17 and diagnosed with the flu. Patient is taking all her medications and says that she is getting better. Patient is eating soup. Unable to talk to patient too long on the phone due to her coughing. Will mail to patient diabetes: sick day plan and influenza educational material. Nurse will follow up with patient next week.

## 2018-01-05 NOTE — TELEPHONE ENCOUNTER
----- Message from Socrates Rayan sent at 1/5/2018  9:52 AM CST -----  Contact: self   Patient need a refill for strips accucheck weston plus , please send to [facility]. Please call back at [phone] (home)         [facility] - [place], [place] - [address]  [address]  [facility] [address]  Phone: [phone] Fax: [phone]

## 2018-01-09 ENCOUNTER — OUTPATIENT CASE MANAGEMENT (OUTPATIENT)
Dept: ADMINISTRATIVE | Facility: OTHER | Age: 53
End: 2018-01-09

## 2018-01-09 ENCOUNTER — TELEPHONE (OUTPATIENT)
Dept: FAMILY MEDICINE | Facility: CLINIC | Age: 53
End: 2018-01-09

## 2018-01-09 ENCOUNTER — HOSPITAL ENCOUNTER (EMERGENCY)
Facility: HOSPITAL | Age: 53
Discharge: HOME | End: 2018-01-09
Attending: EMERGENCY MEDICINE
Payer: MEDICARE

## 2018-01-09 VITALS
WEIGHT: 181 LBS | TEMPERATURE: 99 F | SYSTOLIC BLOOD PRESSURE: 164 MMHG | DIASTOLIC BLOOD PRESSURE: 77 MMHG | HEIGHT: 68 IN | BODY MASS INDEX: 27.43 KG/M2 | OXYGEN SATURATION: 97 % | RESPIRATION RATE: 20 BRPM | HEART RATE: 62 BPM

## 2018-01-09 DIAGNOSIS — R05.9 COUGH: ICD-10-CM

## 2018-01-09 DIAGNOSIS — I50.9 ACUTE CONGESTIVE HEART FAILURE, UNSPECIFIED CONGESTIVE HEART FAILURE TYPE: Primary | ICD-10-CM

## 2018-01-09 LAB
ALBUMIN SERPL BCP-MCNC: 2.5 G/DL
ALP SERPL-CCNC: 76 U/L
ALT SERPL W/O P-5'-P-CCNC: 8 U/L
ANION GAP SERPL CALC-SCNC: 10 MMOL/L
AST SERPL-CCNC: 14 U/L
BASOPHILS # BLD AUTO: 0 K/UL
BASOPHILS NFR BLD: 0.4 %
BILIRUB SERPL-MCNC: 0.2 MG/DL
BNP SERPL-MCNC: 656 PG/ML
BUN SERPL-MCNC: 34 MG/DL
CALCIUM SERPL-MCNC: 8.7 MG/DL
CHLORIDE SERPL-SCNC: 107 MMOL/L
CO2 SERPL-SCNC: 22 MMOL/L
CREAT SERPL-MCNC: 2.8 MG/DL
DIFFERENTIAL METHOD: ABNORMAL
EOSINOPHIL # BLD AUTO: 0.2 K/UL
EOSINOPHIL NFR BLD: 1.8 %
ERYTHROCYTE [DISTWIDTH] IN BLOOD BY AUTOMATED COUNT: 13.5 %
EST. GFR  (AFRICAN AMERICAN): 22 ML/MIN/1.73 M^2
EST. GFR  (NON AFRICAN AMERICAN): 19 ML/MIN/1.73 M^2
GLUCOSE SERPL-MCNC: 300 MG/DL
HCT VFR BLD AUTO: 25 %
HGB BLD-MCNC: 8.7 G/DL
LYMPHOCYTES # BLD AUTO: 1.8 K/UL
LYMPHOCYTES NFR BLD: 15.6 %
MCH RBC QN AUTO: 27.4 PG
MCHC RBC AUTO-ENTMCNC: 34.8 G/DL
MCV RBC AUTO: 79 FL
MONOCYTES # BLD AUTO: 1 K/UL
MONOCYTES NFR BLD: 8.9 %
NEUTROPHILS # BLD AUTO: 8.3 K/UL
NEUTROPHILS NFR BLD: 73.3 %
PLATELET # BLD AUTO: 312 K/UL
PMV BLD AUTO: 8.9 FL
POTASSIUM SERPL-SCNC: 4.5 MMOL/L
PROT SERPL-MCNC: 6.6 G/DL
RBC # BLD AUTO: 3.16 M/UL
SODIUM SERPL-SCNC: 139 MMOL/L
WBC # BLD AUTO: 11.3 K/UL

## 2018-01-09 PROCEDURE — 36415 COLL VENOUS BLD VENIPUNCTURE: CPT

## 2018-01-09 PROCEDURE — 80053 COMPREHEN METABOLIC PANEL: CPT

## 2018-01-09 PROCEDURE — 96374 THER/PROPH/DIAG INJ IV PUSH: CPT

## 2018-01-09 PROCEDURE — 85025 COMPLETE CBC W/AUTO DIFF WBC: CPT

## 2018-01-09 PROCEDURE — 63600175 PHARM REV CODE 636 W HCPCS: Performed by: PHYSICIAN ASSISTANT

## 2018-01-09 PROCEDURE — 83880 ASSAY OF NATRIURETIC PEPTIDE: CPT

## 2018-01-09 PROCEDURE — 99284 EMERGENCY DEPT VISIT MOD MDM: CPT | Mod: 25

## 2018-01-09 RX ORDER — FUROSEMIDE 10 MG/ML
40 INJECTION INTRAMUSCULAR; INTRAVENOUS
Status: COMPLETED | OUTPATIENT
Start: 2018-01-09 | End: 2018-01-09

## 2018-01-09 RX ORDER — METOPROLOL TARTRATE 50 MG/1
50 TABLET ORAL 2 TIMES DAILY
COMMUNITY
End: 2018-01-25 | Stop reason: SDUPTHER

## 2018-01-09 RX ORDER — MONTELUKAST SODIUM 10 MG/1
10 TABLET ORAL NIGHTLY
COMMUNITY
End: 2018-01-25 | Stop reason: SDUPTHER

## 2018-01-09 RX ORDER — FUROSEMIDE 40 MG/1
40 TABLET ORAL DAILY
Qty: 7 TABLET | Refills: 0 | Status: SHIPPED | OUTPATIENT
Start: 2018-01-09 | End: 2018-01-16

## 2018-01-09 RX ORDER — GUAIFENESIN 100 MG/5ML
100-200 SOLUTION ORAL EVERY 4 HOURS PRN
Qty: 60 ML | Refills: 0 | Status: SHIPPED | OUTPATIENT
Start: 2018-01-09 | End: 2018-01-19

## 2018-01-09 RX ORDER — ACETAMINOPHEN 325 MG/1
325 TABLET ORAL EVERY 6 HOURS PRN
COMMUNITY
End: 2022-03-10

## 2018-01-09 RX ADMIN — FUROSEMIDE 40 MG: 10 INJECTION, SOLUTION INTRAMUSCULAR; INTRAVENOUS at 07:01

## 2018-01-09 NOTE — TELEPHONE ENCOUNTER
"Per Dr Lomax's routing comment: "Patient needs to go to the ER immediately.  (Routing comment) "  "

## 2018-01-09 NOTE — PROGRESS NOTES
01/09/18-Called and spoke to patient. Patient's home health nurse came this morning. Patient has gained weight since Sunday but did not call the doctor or home health. Patient has edema in her legs and feet. Patient is still coughing with  thick clear sputum.  Home health nurse  has a call in to Dr. Carlson's office about patient's edema and cough.  Patient's blood pressure was 160/90 this morning.  Patient had just taken her blood pressure medication before home health took her medication. Patient was eating soup but quit because of the sodium content. Patient has not ate breakfast this morning and it almost noon.  Encouraged patient to eat a healthy diabetic diet and take her medications. Encouraged patient to follow doctor's directions when she receives a return call from the doctor's office or home health. Will follow up with patient next week.

## 2018-01-09 NOTE — TELEPHONE ENCOUNTER
----- Message from Fely Finnegan sent at 1/9/2018 10:27 AM CST -----  Contact: Sulma  nurse  8 lbs was gained in 7 days. Problems with blood pressure among other issues. Call [phone] thanks

## 2018-01-09 NOTE — TELEPHONE ENCOUNTER
Spoke to gabrielle with [facility] regarding visit this AM . Reports that patient's BP was 164/90, 1+ pitting edema all the way to knees bilaterally, crackes in bilateral lung bases and a 7# weight gain since Sunday according to home weight record. Nurse feels patient needs to go to the ER for evaluation. Agreed with Waqar [facility] nurse. Advised to have patient go to ER.

## 2018-01-09 NOTE — ED PROVIDER NOTES
Encounter Date: 1/9/2018    SCRIBE #1 NOTE: IKiel, am scribing for, and in the presence of, Landy Burnette NP-C.       History     Chief Complaint   Patient presents with    Cough     recent flu    Shortness of Breath    Leg Swelling       01/09/2018 6:33 PM     Chief complaint: Cough      Leanna García is a 52 y.o. female with HTN, DMII, HLD, and CHF who presents to the ED with an onset of a productive cough with associated leg swelling. The symptoms began since her diagnosis with the flu and have worsened since. The patient states that she was diagnosed with the flu a few weeks ago. The patient's home health nurse heard something when auscultating the patient today and recommended that she comes to the Ed. She denies any dysuria or shortness of breath. No pertinent Shx noted.         The history is provided by the patient.     Review of patient's allergies indicates:   Allergen Reactions    Dilaudid [hydromorphone (bulk)] Nausea And Vomiting     Severe GI upset    Lortab [hydrocodone-acetaminophen] Itching     Past Medical History:   Diagnosis Date    A-fib     resolved per patient    Arthritis     Bronchitis     Cardiovascular event risk, ASCVD 10-year risk 6.7% 10/15/2016    Diabetes mellitus     Diabetes mellitus type II     Encounter for blood transfusion     History of colon polyps 11/2/2016    Hyperlipidemia     Hypertension     Stroke      Past Surgical History:   Procedure Laterality Date    COLONOSCOPY N/A 10/5/2016    Procedure: COLONOSCOPY;  Surgeon: Pillo Chanel MD;  [facility];  Service: Endoscopy;  Laterality: N/A;    HERNIA REPAIR Bilateral 11/22/2016    inguinal    HYSTERECTOMY       Family History   Problem Relation Age of Onset    Hyperlipidemia Mother     Hypertension Mother     Hypertension Father     Diabetes Father     Diabetes Sister     Diabetes Sister     Diabetes Maternal Aunt     Diabetes Maternal Grandmother     Heart disease  Maternal Grandmother     Cancer Paternal Grandmother      Social History   Substance Use Topics    Smoking status: Never Smoker    Smokeless tobacco: Never Used    Alcohol use No     Review of Systems   Constitutional: Negative for chills and fever.   HENT: Negative for facial swelling and trouble swallowing.    Eyes: Negative for discharge.   Respiratory: Positive for cough (producing sputum). Negative for chest tightness, shortness of breath and wheezing.    Cardiovascular: Positive for leg swelling. Negative for chest pain and palpitations.   Gastrointestinal: Negative for abdominal pain, diarrhea, nausea and vomiting.   Genitourinary: Negative for dysuria and hematuria.   Musculoskeletal: Negative for arthralgias, back pain, joint swelling, myalgias, neck pain and neck stiffness.   Skin: Negative for color change, pallor, rash and wound.   Neurological: Negative for dizziness, syncope, weakness, light-headedness, numbness and headaches.   Hematological: Does not bruise/bleed easily.   Psychiatric/Behavioral: The patient is not nervous/anxious.        Physical Exam     Initial Vitals [01/09/18 1609]   BP Pulse Resp Temp SpO2   (!) 190/91 67 20 98.8 °F (37.1 °C) 95 %      MAP       124         Physical Exam    Nursing note and vitals reviewed.  Constitutional: She appears well-developed and well-nourished. She is not diaphoretic. No distress.   HENT:   Head: Normocephalic and atraumatic.   Right Ear: External ear normal.   Left Ear: External ear normal.   Nose: Nose normal.   Mouth/Throat: Oropharynx is clear and moist.   Eyes: Conjunctivae are normal.   Neck: Normal range of motion. Neck supple.   Cardiovascular: Normal rate, regular rhythm, normal heart sounds and intact distal pulses. Exam reveals no gallop and no friction rub.    No murmur heard.  Pulmonary/Chest: Breath sounds normal. No respiratory distress. She has no wheezes. She has no rhonchi. She has no rales.   Equal, bilateral breath sounds noted  without wheezing.      Abdominal: Soft. She exhibits no distension and no mass. There is no tenderness.   Musculoskeletal: Normal range of motion. She exhibits edema (2+ pitting edema to the bilateral lower extremities.). She exhibits no tenderness.   Lymphadenopathy:     She has no cervical adenopathy.   Neurological: She is alert and oriented to person, place, and time. She has normal strength. No sensory deficit.   Skin: Skin is warm and dry. No rash and no abscess noted. No erythema.   Psychiatric: She has a normal mood and affect.         ED Course   Procedures  Labs Reviewed   CBC W/ AUTO DIFFERENTIAL   COMPREHENSIVE METABOLIC PANEL   B-TYPE NATRIURETIC PEPTIDE        Imaging Results          X-Ray Chest PA And Lateral (Final result)  Result time 01/09/18 17:47:11    Final result by Neto Bustillos MD (01/09/18 17:47:11)                 Impression:     Mild bibasilar atelectasis without significant change.      Electronically signed by: Neto Bustillos MD  Date:     01/09/18  Time:    17:47              Narrative:    PA and lateral chest compared to 12/24/17    Findings: The cardiomediastinal silhouette is within normal limits. There is no consolidation or pleural effusion. Mild bibasilar atelectasis is present.                                 Medical Decision Making:   History:   Old Medical Records: I decided to obtain old medical records.  Differential Diagnosis:   Influenza  Pneumonia  Viral Syndrome  CHF  COPD  ACS  PE    Clinical Tests:   Lab Tests: Ordered and Reviewed  Radiological Study: Ordered and Reviewed       APC / Resident Notes:   Pt has had cough x 2 weeks and her home health nurse sent her here for further evaluation of possible pneumonia.  Chest xray shows no evidence for pneumonia.  Creatinine is elevated, but is consistent with previous.  H&H is decreased, but she is hemoccult negative. BNP is elevated.  She is likely experiencing her symptoms, secondary to acute CHF exacerbation.   She is not distressed and vitals are stable.  She is given a dose of IV Lasix 40 mg here in the ED.  BP has decreased.  We feel comfortable discharging her home to follow-up with her PCP for re-evaluation and further treatment options in 2-3 days.  She will take 40 mg of Lasix at home for the next week.  She voices understanding and is agreeable to the plan. She is given specific return precautions.        Scribe Attestation:   Scribe #1: I performed the above scribed service and the documentation accurately describes the services I performed. I attest to the accuracy of the note.    I, Landy Burnette PA-C, personally performed the services described in this documentation. All medical record entries made by the scribe were at my direction and in my presence.  I have reviewed the chart and agree that the record reflects my personal performance and is accurate and complete. Landy Burnette PA-C.  12:41 AM 01/10/2018          ED Course      Clinical Impression:     1. Cough      1. Acute congestive heart failure, unspecified congestive heart failure type    2. Cough          Disposition:   Disposition: Discharged  Condition: Stable                        Landy Burnette PA-C  01/10/18 0041

## 2018-01-10 ENCOUNTER — TELEPHONE (OUTPATIENT)
Dept: FAMILY MEDICINE | Facility: CLINIC | Age: 53
End: 2018-01-10

## 2018-01-11 NOTE — TELEPHONE ENCOUNTER
----- Message from Abbie Brooks sent at 1/11/2018  9:05 AM CST -----  Contact: pt  Pt calling states that she called last week and the nurse was supposed to have sent her acu check abiva plus test strips and she is calling to verify of sent cause she doesn't want to run out  Or wait till last moment please notify her at ..[phone] (home)         .  [facility] - [place], [place] - [address]  [address]  [facility] [address]  Phone: [phone] Fax: [phone]

## 2018-01-11 NOTE — TELEPHONE ENCOUNTER
PAtient states that she is currently testing glucose 3x daily and needs a 3 mo supply of test strips sent to [facility].

## 2018-01-16 ENCOUNTER — TELEPHONE (OUTPATIENT)
Dept: FAMILY MEDICINE | Facility: CLINIC | Age: 53
End: 2018-01-16

## 2018-01-16 ENCOUNTER — DOCUMENTATION ONLY (OUTPATIENT)
Dept: FAMILY MEDICINE | Facility: CLINIC | Age: 53
End: 2018-01-16

## 2018-01-16 DIAGNOSIS — R09.89 BIBASILAR CRACKLES: Primary | ICD-10-CM

## 2018-01-16 NOTE — PROGRESS NOTES
Pre-Visit Chart Review  For Appointment Scheduled on 01/18/2018    Health Maintenance Due   Topic Date Due    Eye Exam  08/12/1975    TETANUS VACCINE  08/12/1983    Pneumococcal PPSV23 (Medium Risk) (1) 08/12/1983    Mammogram  03/24/2017    Influenza Vaccine  08/01/2017    Colonoscopy  10/05/2017    Foot Exam  01/03/2018    Hemoglobin A1c  01/12/2018

## 2018-01-16 NOTE — TELEPHONE ENCOUNTER
----- Message from Sofie Taylor sent at 1/16/2018  9:13 AM CST -----    Calling   From  [facility]   Please call //gabrielle // [phone]    Calling to  Report  crackes  r  Back lung // please a  Call to pod ,  Need a  Chest  Xray  Done  In   Home asap

## 2018-01-16 NOTE — TELEPHONE ENCOUNTER
----- Message from Keli Martinez sent at 1/16/2018  2:19 PM CST -----  Contact: [facility] faxed something over but they did not receive it. Fax number is .  .

## 2018-01-17 ENCOUNTER — OUTPATIENT CASE MANAGEMENT (OUTPATIENT)
Dept: ADMINISTRATIVE | Facility: OTHER | Age: 53
End: 2018-01-17

## 2018-01-17 NOTE — PROGRESS NOTES
01/17/18-Called and spoke to patient. Patient still with a cough. Patient is taking all her medications and checking her blood glucose. Patient with no electricity at this time at her house but she does have a generator. Patient has an appointment at the clinic tomorrow at 1:40, instructed her to call the clinic before getting on the road tomorrow. Patient has her home health nurse's phone number and [facility] on call phone numbers. Nurse will follow up with patient in two weeks.   [x] Please be sure to have a supply of water, non-perishable food items, flashlights and batteries with you in your house.   [x] Please be sure you bring all of your medications with you. Bring at least a five day supply. It is best to bring your medication bottles, in case you are displaced for a longer period of time, therefore you can get your medication refilled from your temporary location.   [] Please bring sure to bring any DME that you may need. This includes walkers, wheelchairs, shower chairs, nebulizer machine, etc.   [x] If you are a diabetic- be sure to bring your glucometer and all glucometer monitoring supplies.   [x] If you have high blood pressure- be sure to bring your blood pressure cuff so you can continue to monitor.   [x] If you have CHF-be sure to bring your scale so you can continue to monitor.  [] If on PEG feeding- be sure to bring tube feedings and feeding supplies.  [] If on oxygen- be sure to bring all of your oxygen supplies: Cannula, portable tanks, concentrator, etc. Also contact you Oxygen Supply Company to find out the nearest location of an oxygen supply company to where you will be located. ([employer]: [phone], [facility]: [phone], [facility]:[phone], [employer]: [phone]).   [] If you receive hemodialysis- you should already have a plan in place with your dialysis center. If you do not know where you need to evacuate to in order to be close to a dialysis center- reach out  to your dialysis center for further direction. ([facility] service line: [phone], [facility] service line [phone])  [] If receiving treatment at an infusion center- please contact the infusion center to find out which infusion center they have a contract with. Also ask your infusion center for location of the infusion center they are in contract with, so if need be you can evacuate to that area.   Office of Emergency Preparedness Phone number:  [] [facility]: [phone]  [] [facility]: [phone]  [] [facility]: [phone]  [] [facility]: [phone]  [x] [facility]: [phone]  [] [facility]: [phone]  [] [facility]: [phone]  [] [facility]: [phone]  [] [facility]: [phone]  [] [facility]: [phone]  [] [facility]: [phone]  [] [facility]: [phone]  [] [facility]: [phone]    [] [facility]:  [phone]   [] [facility]:  [phone]   [] [facility]:  [phone]   [] [facility]:  [phone]   [] [facility]:  [phone]   [] [facility]: [phone]   [] [facility]:  [phone]   [] [facility]:  [phone]   [] [facility]:  [phone]   [] [facility]:  [phone]   [] [facility]:  [phone]   [] [facility]:  [phone]   [] [facility]:  [phone]   [] [facility]:  [phone]   [] [facility]:  [phone]   [] [facility]: [phone]   [] [facility]:  [phone]   [] [facility]: [phone]   [] [facility]: [phone]

## 2018-01-18 ENCOUNTER — OFFICE VISIT (OUTPATIENT)
Dept: FAMILY MEDICINE | Facility: CLINIC | Age: 53
End: 2018-01-18
Payer: MEDICARE

## 2018-01-18 ENCOUNTER — APPOINTMENT (OUTPATIENT)
Dept: LAB | Facility: HOSPITAL | Age: 53
End: 2018-01-18
Attending: PHYSICIAN ASSISTANT
Payer: MEDICARE

## 2018-01-18 ENCOUNTER — HOSPITAL ENCOUNTER (OUTPATIENT)
Dept: RADIOLOGY | Facility: CLINIC | Age: 53
Discharge: HOME | End: 2018-01-18
Attending: PHYSICIAN ASSISTANT
Payer: MEDICARE

## 2018-01-18 VITALS
HEIGHT: 68 IN | DIASTOLIC BLOOD PRESSURE: 82 MMHG | SYSTOLIC BLOOD PRESSURE: 158 MMHG | BODY MASS INDEX: 30.67 KG/M2 | WEIGHT: 202.38 LBS | HEART RATE: 72 BPM | TEMPERATURE: 98 F

## 2018-01-18 DIAGNOSIS — I15.2 HYPERTENSION ASSOCIATED WITH DIABETES: ICD-10-CM

## 2018-01-18 DIAGNOSIS — E16.2 HYPOGLYCEMIA: ICD-10-CM

## 2018-01-18 DIAGNOSIS — N18.4 TYPE 2 DIABETES MELLITUS WITH STAGE 4 CHRONIC KIDNEY DISEASE, WITHOUT LONG-TERM CURRENT USE OF INSULIN: Primary | ICD-10-CM

## 2018-01-18 DIAGNOSIS — E11.22 TYPE 2 DIABETES MELLITUS WITH STAGE 4 CHRONIC KIDNEY DISEASE, WITHOUT LONG-TERM CURRENT USE OF INSULIN: Primary | ICD-10-CM

## 2018-01-18 DIAGNOSIS — E11.59 HYPERTENSION ASSOCIATED WITH DIABETES: ICD-10-CM

## 2018-01-18 DIAGNOSIS — R05.9 COUGH: ICD-10-CM

## 2018-01-18 DIAGNOSIS — E78.5 HYPERLIPIDEMIA ASSOCIATED WITH TYPE 2 DIABETES MELLITUS: ICD-10-CM

## 2018-01-18 DIAGNOSIS — E11.69 HYPERLIPIDEMIA ASSOCIATED WITH TYPE 2 DIABETES MELLITUS: ICD-10-CM

## 2018-01-18 PROCEDURE — 99999 PR PBB SHADOW E&M-EST. PATIENT-LVL IV: CPT | Mod: PBBFAC,,, | Performed by: PHYSICIAN ASSISTANT

## 2018-01-18 PROCEDURE — 99214 OFFICE O/P EST MOD 30 MIN: CPT | Mod: S$GLB,,, | Performed by: PHYSICIAN ASSISTANT

## 2018-01-18 PROCEDURE — 83880 ASSAY OF NATRIURETIC PEPTIDE: CPT

## 2018-01-18 PROCEDURE — 71046 X-RAY EXAM CHEST 2 VIEWS: CPT | Mod: TC,FY,PO

## 2018-01-18 PROCEDURE — 80053 COMPREHEN METABOLIC PANEL: CPT

## 2018-01-18 PROCEDURE — 71046 X-RAY EXAM CHEST 2 VIEWS: CPT | Mod: 26,,, | Performed by: RADIOLOGY

## 2018-01-18 RX ORDER — BENZONATATE 100 MG/1
100 CAPSULE ORAL 3 TIMES DAILY PRN
Qty: 30 CAPSULE | Refills: 0 | Status: SHIPPED | OUTPATIENT
Start: 2018-01-18 | End: 2018-01-28

## 2018-01-18 NOTE — PROGRESS NOTES
Subjective:       Patient ID: Leanna García is a 52 y.o. female.    Chief Complaint: Hospital Follow Up (ER visit)    Patient with poorly controlled type 2 diabetes mellitus, hypertension, chronic kidney disease stage 4 presents for ER follow up.  She was diagnosed with influenza about 1 month ago.  She returned to ER 1 1/2 weeks ago for persistent cough.  She was given IV lasix for acute heart failure.  She was discharged with increased dose of lasix for 7 days.  She states that when she was taking the 40 mg dose her cough and breathing were improved.  She has returned now to her regular 20 mg dose and cough is worse.  She has 2 pillow orthopnea.  Cough is productive of white sputum.  Her lower extremity edema is about at her baseline.  Her weight is down 2 lb since ER visit according to our records.  She last saw her nephrologist in early December.  She has several medication changes including stopping spirolactone,  Potassium, metformin, and arb.  Her bp log is 140-150's /70-90s.  Regarding her diabetes she states that this has been the best controlled glucose readings she has ever had.  Her  reports 3 episodes of hypoglycemia over the past 1-2 months.        Review of Systems   Constitutional: Negative for activity change, appetite change, chills, fatigue and fever.   HENT: Negative for congestion, ear pain, postnasal drip, rhinorrhea, sinus pressure, sneezing and voice change.    Respiratory: Positive for apnea and cough. Negative for choking, chest tightness, shortness of breath, wheezing and stridor.    Cardiovascular: Positive for leg swelling. Negative for chest pain and palpitations.   Gastrointestinal: Negative for abdominal distention, abdominal pain, nausea and vomiting.   Endocrine: Negative for polydipsia and polyuria.   Genitourinary: Negative for urgency.   Neurological: Negative for dizziness, weakness, light-headedness and headaches.       Objective:      Physical Exam   Constitutional:  She appears well-developed and well-nourished. She is cooperative. No distress.   HENT:   Head: Normocephalic and atraumatic.   Mouth/Throat: Oropharynx is clear and moist.   Eyes: Conjunctivae and EOM are normal. Pupils are equal, round, and reactive to light. No scleral icterus.   Neck: No JVD present. Carotid bruit is not present. No thyromegaly present.   Cardiovascular: Normal rate, regular rhythm and normal heart sounds.    Pulmonary/Chest: Effort normal and breath sounds normal.   Abdominal: Soft. Bowel sounds are normal.   Musculoskeletal:        Right lower leg: She exhibits edema (2 + pitting edmea to proximal shin ).        Left lower leg: She exhibits edema (2 + pitting to proxima shin ).   Neurological: She is alert.   Skin: Skin is warm and dry.   Psychiatric: She has a normal mood and affect. Her speech is normal. Judgment normal. Cognition and memory are normal.   Vitals reviewed.      Assessment:       1. Type 2 diabetes mellitus with stage 4 chronic kidney disease, without long-term current use of insulin    2. Hypertension associated with diabetes    3. Hyperlipidemia associated with type 2 diabetes mellitus    4. Hypoglycemia    5. Cough        Plan:       Leanna was seen today for hospital follow up.    Diagnoses and all orders for this visit:    Type 2 diabetes mellitus with stage 4 chronic kidney disease, without long-term current use of insulin  -     Hemoglobin A1c; Future  -     CBC auto differential; Future  -     Comprehensive metabolic panel; Future  Send labs to nephrologist   Hypertension associated with diabetes, not at goal  Continue BP log   Increase lasix to 40 mg qd X 3 days   Consider increasing dose of procardia xl to 90 mg   Hyperlipidemia associated with type 2 diabetes mellitus  Continue lipitor  Needs repeat lipid panel   Hypoglycemia  Check A1C today   Discuss further with Dr Lomax   Needs diabetic ed appt   Cough  -     Brain natriuretic peptide; Future.  Will need follow  up with cardiology.  ? Repeat echo ?   -     X-Ray Chest PA And Lateral; Future  Further recommendations will be made based on results   Increase lasix as stated above      -     -     benzonatate (TESSALON) 100 MG capsule; Take 1 capsule (100 mg total) by mouth 3 (three) times daily as needed.  Albuterol as prescribed   Consider trial of steroid inhaler

## 2018-01-18 NOTE — PATIENT INSTRUCTIONS
Increase lasix to 40 mg a day for 3 days then resume 20 mg dose  Further recommendations will be made based on results of xray and lab today  Use albuterol inhaler 2 puffs every 4 hours as needed  Use tessalon for cough

## 2018-01-19 DIAGNOSIS — N18.4 TYPE 2 DIABETES MELLITUS WITH STAGE 4 CHRONIC KIDNEY DISEASE, WITHOUT LONG-TERM CURRENT USE OF INSULIN: Primary | ICD-10-CM

## 2018-01-19 DIAGNOSIS — E11.22 TYPE 2 DIABETES MELLITUS WITH STAGE 4 CHRONIC KIDNEY DISEASE, WITHOUT LONG-TERM CURRENT USE OF INSULIN: Primary | ICD-10-CM

## 2018-01-22 ENCOUNTER — TELEPHONE (OUTPATIENT)
Dept: FAMILY MEDICINE | Facility: CLINIC | Age: 53
End: 2018-01-22

## 2018-01-22 NOTE — TELEPHONE ENCOUNTER
Spoke with Vicky and she wanted to review any changes that were made to the patient's plan of care after her appointment with Betsey on 1/19. Advised Vicky I would fax her the AVS, lab results, and xray results. Faxed to [phone].

## 2018-01-22 NOTE — TELEPHONE ENCOUNTER
----- Message from Ladonna Garcia sent at 1/19/2018  1:31 PM CST -----  Contact: gabrielle with [facility]   [facility] needing to speak to nurse about recent appt     Please call back [facility] [phone]

## 2018-01-23 ENCOUNTER — OUTPATIENT CASE MANAGEMENT (OUTPATIENT)
Dept: ADMINISTRATIVE | Facility: OTHER | Age: 53
End: 2018-01-23

## 2018-01-23 NOTE — PROGRESS NOTES
1-28-18 --Please note the following patient has been transferred to Stacy ALEX in Outpatient Complex Care Management. Omar ALEX CCM

## 2018-01-25 NOTE — TELEPHONE ENCOUNTER
lov: 1/18/18  labs: 1/18/18  refill:   metoprolol: historical med  singulair: Historical Med.       Informed that her other medications are not due for a refill. She currently still has refills on those medications.

## 2018-01-25 NOTE — TELEPHONE ENCOUNTER
----- Message from Margarita Overton sent at 1/24/2018 10:13 AM CST -----  Contact: Patient  Patient states that he had left a previous message about getting the medications refilled at [facility], the mail order pharmacy and is following up.  It's for the metoprolol tartrate (LOPRESSOR) 50 MG tablets, NIFEdipine (PROCARDIA-XL) 60 MG (OSM) 24 hr tablets, fenofibrate 160 MG Tablets, Atorvastatin (LIPITOR) 80 MG tablets, glipiZIDE (GLUCOTROL) 10 MG tablets, furosemide (LASIX) 20 MG tablets and the  montelukast (SINGULAIR) 10 mg tablets.  All for the 3 month supply.  Any questions, please call the patient back at [phone].  Thank you      [facility] - [place], [place] - [address]  [address] [address]  Phone: [phone] Fax: [phone]

## 2018-01-25 NOTE — TELEPHONE ENCOUNTER
----- Message from Margarita Overton sent at 1/24/2018 10:13 AM CST -----  Contact: Patient  Patient states that he had left a previous message about getting the medications refilled at [facility], the mail order pharmacy and is following up.  It's for the metoprolol tartrate (LOPRESSOR) 50 MG tablets, NIFEdipine (PROCARDIA-XL) 60 MG (OSM) 24 hr tablets, fenofibrate 160 MG Tablets, Atorvastatin (LIPITOR) 80 MG tablets, glipiZIDE (GLUCOTROL) 10 MG tablets, furosemide (LASIX) 20 MG tablets and the  montelukast (SINGULAIR) 10 mg tablets.  All for the 3 month supply.  Any questions, please call the patient back at [phone].  Thank you      [facility] - [place], [place] - [address]  [address] [address]  Phone: [phone] Fax: [phone]

## 2018-01-26 ENCOUNTER — OUTPATIENT CASE MANAGEMENT (OUTPATIENT)
Dept: ADMINISTRATIVE | Facility: OTHER | Age: 53
End: 2018-01-26

## 2018-01-26 DIAGNOSIS — I10 ESSENTIAL HYPERTENSION: ICD-10-CM

## 2018-01-26 RX ORDER — MONTELUKAST SODIUM 10 MG/1
10 TABLET ORAL NIGHTLY
Qty: 90 TABLET | Refills: 1 | Status: SHIPPED | OUTPATIENT
Start: 2018-01-26 | End: 2018-03-23 | Stop reason: SDUPTHER

## 2018-01-26 RX ORDER — METOPROLOL TARTRATE 50 MG/1
50 TABLET ORAL 2 TIMES DAILY
Qty: 180 TABLET | Refills: 1 | Status: ON HOLD | OUTPATIENT
Start: 2018-01-26 | End: 2018-02-11

## 2018-01-26 NOTE — PROGRESS NOTES
1/26/18: 1st Attempt to complete Social Work Assessment for Outpatient Care Management; left message requesting a return call.

## 2018-01-29 RX ORDER — FUROSEMIDE 20 MG/1
TABLET ORAL
Qty: 90 TABLET | Refills: 1 | Status: ON HOLD | OUTPATIENT
Start: 2018-01-29 | End: 2018-02-11 | Stop reason: HOSPADM

## 2018-01-30 ENCOUNTER — OUTPATIENT CASE MANAGEMENT (OUTPATIENT)
Dept: ADMINISTRATIVE | Facility: OTHER | Age: 53
End: 2018-01-30

## 2018-01-30 NOTE — PROGRESS NOTES
1/30/2018  Case transferred to me.  Reviewed of EMR, review of problem list and goals.  1st Attempt to complete follow-up for Outpatient Care Management; left a detailed message requesting return call.  ABI Grimm

## 2018-02-01 ENCOUNTER — OUTPATIENT CASE MANAGEMENT (OUTPATIENT)
Dept: ADMINISTRATIVE | Facility: OTHER | Age: 53
End: 2018-02-01

## 2018-02-01 NOTE — PROGRESS NOTES
2/1/18: LCSW called pt to complete SW assessment.  Pt lives with her  and requires assistance with some of her ADL's.  Pt has hx of generalized anxiety disorder; she is able to manage it on her own without behavioral health intervention.  Pt's  works and she talked about having difficulties getting to her doctor's appointments as she does not drive.  LCSW inquired about transportation through her [employer] insurance and she does not believe she has that benefit.  LCSW will contact [employer] to determine if transportation benefits are covered under pt's policy.  Pt's support system consist of her , sister-in-law, and neighbors.  LCSW will be out of the clinic the week of 2/12/18 - 2/16/18; will follow up with pt the following week.      PLAN:    Contact [employer] for benefits eligibility.

## 2018-02-06 ENCOUNTER — OUTPATIENT CASE MANAGEMENT (OUTPATIENT)
Dept: ADMINISTRATIVE | Facility: OTHER | Age: 53
End: 2018-02-06

## 2018-02-06 NOTE — PROGRESS NOTES
2/6/2018  Follow up call completed with patient this am.  Pt. Reports doing better, states her cough has improved and her leg swelling is better today.  Noted ED visit on 1/9 and follow up visit in clinic on 1/18.  Pt. Reports that  nurse is visiting weekly.  Pt. Reports that this am her BP was 143/89, patient is keeping log.  Encouraged medication compliance.  Pt. Reports that she checks her glucose 2x daily and keeps log.  Pt. Reports that she is checking her weight 1x daily and keeps it on the log.  Encouraged her progress, and encouraged her to bring the log with her to appointments.  Pt's recent A1C lab is 8.1  Encouraged patient's progress she was 12.9 on last A1C check.  Encouraged patient's effort and reinforced good food choices, medication compliance to keep that number down.  Pt. Verbalized understanding.  Reviewed with patient Gerardoner on call number, will mail magnet with number, encouraged her to put it on her fridge for reference when needed.  Also encouraged patient to call phone physician office if she feels she needs to be seen.  Pt. Verbalized understanding.  Pt. Is agreeable to call in couple weeks, will send my contact information to her and encouraged her to contact me with any needs I can assist her with.  Noted OPCM SW call re transportation resources.    Follow up Plan:    Johanna on call number  Encourage DM diet  Encourage medication compliance  Collaboration with OPCM HUNTER  Collaboration with ben Grimm, AIB

## 2018-02-09 ENCOUNTER — TELEPHONE (OUTPATIENT)
Dept: FAMILY MEDICINE | Facility: CLINIC | Age: 53
End: 2018-02-09

## 2018-02-09 ENCOUNTER — HOSPITAL ENCOUNTER (OUTPATIENT)
Facility: HOSPITAL | Age: 53
Discharge: HOME HEALTH | End: 2018-02-11
Attending: EMERGENCY MEDICINE | Admitting: HOSPITALIST
Payer: MEDICARE

## 2018-02-09 DIAGNOSIS — E11.8 TYPE 2 DIABETES MELLITUS WITH COMPLICATION, WITH LONG-TERM CURRENT USE OF INSULIN: ICD-10-CM

## 2018-02-09 DIAGNOSIS — I15.2 HYPERTENSION ASSOCIATED WITH DIABETES: Chronic | ICD-10-CM

## 2018-02-09 DIAGNOSIS — I10 ACCELERATED HYPERTENSION: ICD-10-CM

## 2018-02-09 DIAGNOSIS — N12 PYELONEPHRITIS: Primary | ICD-10-CM

## 2018-02-09 DIAGNOSIS — E11.59 HYPERTENSION ASSOCIATED WITH DIABETES: Chronic | ICD-10-CM

## 2018-02-09 DIAGNOSIS — Z79.4 TYPE 2 DIABETES MELLITUS WITH COMPLICATION, WITH LONG-TERM CURRENT USE OF INSULIN: ICD-10-CM

## 2018-02-09 DIAGNOSIS — I16.0 HYPERTENSIVE URGENCY: ICD-10-CM

## 2018-02-09 LAB
ALBUMIN SERPL BCP-MCNC: 2.9 G/DL
ALP SERPL-CCNC: 114 U/L
ALT SERPL W/O P-5'-P-CCNC: 12 U/L
ANION GAP SERPL CALC-SCNC: 9 MMOL/L
AST SERPL-CCNC: 14 U/L
BACTERIA #/AREA URNS HPF: ABNORMAL /HPF
BASOPHILS # BLD AUTO: 0.2 K/UL
BASOPHILS NFR BLD: 1.6 %
BILIRUB SERPL-MCNC: 0.2 MG/DL
BILIRUB UR QL STRIP: NEGATIVE
BUN SERPL-MCNC: 35 MG/DL
C3 SERPL-MCNC: 147 MG/DL
C4 SERPL-MCNC: 23 MG/DL
CALCIUM SERPL-MCNC: 9.6 MG/DL
CHLORIDE SERPL-SCNC: 104 MMOL/L
CLARITY UR: CLEAR
CO2 SERPL-SCNC: 25 MMOL/L
COLOR UR: YELLOW
CREAT SERPL-MCNC: 2.1 MG/DL
CRP SERPL-MCNC: 4.1 MG/L
DIFFERENTIAL METHOD: ABNORMAL
EOSINOPHIL # BLD AUTO: 0.3 K/UL
EOSINOPHIL NFR BLD: 2.3 %
ERYTHROCYTE [DISTWIDTH] IN BLOOD BY AUTOMATED COUNT: 13.5 %
EST. GFR  (AFRICAN AMERICAN): 31 ML/MIN/1.73 M^2
EST. GFR  (NON AFRICAN AMERICAN): 26 ML/MIN/1.73 M^2
ESTIMATED AVG GLUCOSE: 169 MG/DL
GLUCOSE SERPL-MCNC: 254 MG/DL
GLUCOSE UR QL STRIP: ABNORMAL
HBA1C MFR BLD HPLC: 7.5 %
HCT VFR BLD AUTO: 32.5 %
HGB BLD-MCNC: 10.7 G/DL
HGB UR QL STRIP: ABNORMAL
HYALINE CASTS #/AREA URNS LPF: 0 /LPF
KETONES UR QL STRIP: NEGATIVE
LEUKOCYTE ESTERASE UR QL STRIP: NEGATIVE
LYMPHOCYTES # BLD AUTO: 1.7 K/UL
LYMPHOCYTES NFR BLD: 15.8 %
MCH RBC QN AUTO: 26.2 PG
MCHC RBC AUTO-ENTMCNC: 33 G/DL
MCV RBC AUTO: 80 FL
MICROSCOPIC COMMENT: ABNORMAL
MONOCYTES # BLD AUTO: 0.7 K/UL
MONOCYTES NFR BLD: 6.6 %
NEUTROPHILS # BLD AUTO: 8.1 K/UL
NEUTROPHILS NFR BLD: 73.7 %
NITRITE UR QL STRIP: NEGATIVE
NON-SQ EPI CELLS #/AREA URNS HPF: 1 /HPF
PH UR STRIP: 7 [PH] (ref 5–8)
PLATELET # BLD AUTO: 339 K/UL
PMV BLD AUTO: 8.3 FL
POCT GLUCOSE: 130 MG/DL (ref 70–110)
POCT GLUCOSE: 158 MG/DL (ref 70–110)
POCT GLUCOSE: 98 MG/DL (ref 70–110)
POTASSIUM SERPL-SCNC: 4.9 MMOL/L
PROT SERPL-MCNC: 7.4 G/DL
PROT UR QL STRIP: ABNORMAL
RBC # BLD AUTO: 4.09 M/UL
RBC #/AREA URNS HPF: 3 /HPF (ref 0–4)
SODIUM SERPL-SCNC: 138 MMOL/L
SP GR UR STRIP: 1.02 (ref 1–1.03)
SQUAMOUS #/AREA URNS HPF: 1 /HPF
URN SPEC COLLECT METH UR: ABNORMAL
UROBILINOGEN UR STRIP-ACNC: NEGATIVE EU/DL
WBC # BLD AUTO: 10.9 K/UL
WBC #/AREA URNS HPF: 4 /HPF (ref 0–5)
YEAST URNS QL MICRO: ABNORMAL

## 2018-02-09 PROCEDURE — 63600175 PHARM REV CODE 636 W HCPCS: Performed by: NURSE PRACTITIONER

## 2018-02-09 PROCEDURE — 87086 URINE CULTURE/COLONY COUNT: CPT

## 2018-02-09 PROCEDURE — 25000003 PHARM REV CODE 250: Performed by: NURSE PRACTITIONER

## 2018-02-09 PROCEDURE — 63600175 PHARM REV CODE 636 W HCPCS: Performed by: EMERGENCY MEDICINE

## 2018-02-09 PROCEDURE — 96361 HYDRATE IV INFUSION ADD-ON: CPT

## 2018-02-09 PROCEDURE — 86225 DNA ANTIBODY NATIVE: CPT

## 2018-02-09 PROCEDURE — 86038 ANTINUCLEAR ANTIBODIES: CPT

## 2018-02-09 PROCEDURE — G0378 HOSPITAL OBSERVATION PER HR: HCPCS

## 2018-02-09 PROCEDURE — 25000003 PHARM REV CODE 250: Performed by: EMERGENCY MEDICINE

## 2018-02-09 PROCEDURE — 85025 COMPLETE CBC W/AUTO DIFF WBC: CPT

## 2018-02-09 PROCEDURE — 36415 COLL VENOUS BLD VENIPUNCTURE: CPT

## 2018-02-09 PROCEDURE — 86140 C-REACTIVE PROTEIN: CPT

## 2018-02-09 PROCEDURE — 86160 COMPLEMENT ANTIGEN: CPT | Mod: 59

## 2018-02-09 PROCEDURE — 80053 COMPREHEN METABOLIC PANEL: CPT

## 2018-02-09 PROCEDURE — 96374 THER/PROPH/DIAG INJ IV PUSH: CPT

## 2018-02-09 PROCEDURE — 96375 TX/PRO/DX INJ NEW DRUG ADDON: CPT

## 2018-02-09 PROCEDURE — 99285 EMERGENCY DEPT VISIT HI MDM: CPT | Mod: 25

## 2018-02-09 PROCEDURE — 83036 HEMOGLOBIN GLYCOSYLATED A1C: CPT

## 2018-02-09 PROCEDURE — 63600175 PHARM REV CODE 636 W HCPCS: Performed by: HOSPITALIST

## 2018-02-09 PROCEDURE — 86160 COMPLEMENT ANTIGEN: CPT

## 2018-02-09 PROCEDURE — 96360 HYDRATION IV INFUSION INIT: CPT

## 2018-02-09 PROCEDURE — 82962 GLUCOSE BLOOD TEST: CPT

## 2018-02-09 PROCEDURE — 63700000 PHARM REV CODE 250 ALT 637 W/O HCPCS: Performed by: NURSE PRACTITIONER

## 2018-02-09 PROCEDURE — 81000 URINALYSIS NONAUTO W/SCOPE: CPT

## 2018-02-09 PROCEDURE — 25000003 PHARM REV CODE 250: Performed by: HOSPITALIST

## 2018-02-09 RX ORDER — LABETALOL HYDROCHLORIDE 5 MG/ML
20 INJECTION, SOLUTION INTRAVENOUS
Status: DISCONTINUED | OUTPATIENT
Start: 2018-02-09 | End: 2018-02-10

## 2018-02-09 RX ORDER — SODIUM CHLORIDE 0.9 % (FLUSH) 0.9 %
5 SYRINGE (ML) INJECTION
Status: DISCONTINUED | OUTPATIENT
Start: 2018-02-09 | End: 2018-02-11 | Stop reason: HOSPADM

## 2018-02-09 RX ORDER — ONDANSETRON 2 MG/ML
4 INJECTION INTRAMUSCULAR; INTRAVENOUS EVERY 8 HOURS PRN
Status: DISCONTINUED | OUTPATIENT
Start: 2018-02-09 | End: 2018-02-11 | Stop reason: HOSPADM

## 2018-02-09 RX ORDER — RAMELTEON 8 MG/1
8 TABLET ORAL NIGHTLY PRN
Status: DISCONTINUED | OUTPATIENT
Start: 2018-02-09 | End: 2018-02-11 | Stop reason: HOSPADM

## 2018-02-09 RX ORDER — MORPHINE SULFATE 2 MG/ML
8 INJECTION, SOLUTION INTRAMUSCULAR; INTRAVENOUS
Status: DISCONTINUED | OUTPATIENT
Start: 2018-02-09 | End: 2018-02-09

## 2018-02-09 RX ORDER — ENOXAPARIN SODIUM 100 MG/ML
40 INJECTION SUBCUTANEOUS EVERY 24 HOURS
Status: DISCONTINUED | OUTPATIENT
Start: 2018-02-09 | End: 2018-02-09

## 2018-02-09 RX ORDER — ALBUTEROL SULFATE 2.5 MG/.5ML
2.5 SOLUTION RESPIRATORY (INHALATION) EVERY 4 HOURS PRN
Status: DISCONTINUED | OUTPATIENT
Start: 2018-02-09 | End: 2018-02-11 | Stop reason: HOSPADM

## 2018-02-09 RX ORDER — SODIUM CHLORIDE 9 MG/ML
INJECTION, SOLUTION INTRAVENOUS CONTINUOUS
Status: DISCONTINUED | OUTPATIENT
Start: 2018-02-09 | End: 2018-02-10

## 2018-02-09 RX ORDER — GLUCAGON 1 MG
1 KIT INJECTION
Status: DISCONTINUED | OUTPATIENT
Start: 2018-02-09 | End: 2018-02-11 | Stop reason: HOSPADM

## 2018-02-09 RX ORDER — ASPIRIN 81 MG/1
81 TABLET ORAL DAILY
Status: DISCONTINUED | OUTPATIENT
Start: 2018-02-09 | End: 2018-02-11 | Stop reason: HOSPADM

## 2018-02-09 RX ORDER — HYDRALAZINE HYDROCHLORIDE 20 MG/ML
10 INJECTION INTRAMUSCULAR; INTRAVENOUS ONCE
Status: COMPLETED | OUTPATIENT
Start: 2018-02-09 | End: 2018-02-09

## 2018-02-09 RX ORDER — IBUPROFEN 200 MG
16 TABLET ORAL
Status: DISCONTINUED | OUTPATIENT
Start: 2018-02-09 | End: 2018-02-11 | Stop reason: HOSPADM

## 2018-02-09 RX ORDER — ACETAMINOPHEN 500 MG
1000 TABLET ORAL EVERY 6 HOURS PRN
Status: DISCONTINUED | OUTPATIENT
Start: 2018-02-09 | End: 2018-02-11 | Stop reason: HOSPADM

## 2018-02-09 RX ORDER — FENOFIBRATE 160 MG/1
160 TABLET ORAL DAILY
Status: DISCONTINUED | OUTPATIENT
Start: 2018-02-09 | End: 2018-02-11 | Stop reason: HOSPADM

## 2018-02-09 RX ORDER — ALPRAZOLAM 0.25 MG/1
0.25 TABLET ORAL 4 TIMES DAILY PRN
Status: DISCONTINUED | OUTPATIENT
Start: 2018-02-09 | End: 2018-02-11 | Stop reason: HOSPADM

## 2018-02-09 RX ORDER — NIFEDIPINE 30 MG/1
60 TABLET, EXTENDED RELEASE ORAL DAILY
Status: DISCONTINUED | OUTPATIENT
Start: 2018-02-09 | End: 2018-02-10

## 2018-02-09 RX ORDER — CEFTRIAXONE 1 G/1
1 INJECTION, POWDER, FOR SOLUTION INTRAMUSCULAR; INTRAVENOUS
Status: DISCONTINUED | OUTPATIENT
Start: 2018-02-09 | End: 2018-02-11 | Stop reason: HOSPADM

## 2018-02-09 RX ORDER — LANOLIN ALCOHOL/MO/W.PET/CERES
800 CREAM (GRAM) TOPICAL
Status: DISCONTINUED | OUTPATIENT
Start: 2018-02-09 | End: 2018-02-11 | Stop reason: HOSPADM

## 2018-02-09 RX ORDER — METOPROLOL TARTRATE 50 MG/1
50 TABLET ORAL 2 TIMES DAILY
Status: DISCONTINUED | OUTPATIENT
Start: 2018-02-09 | End: 2018-02-10

## 2018-02-09 RX ORDER — POTASSIUM CHLORIDE 20 MEQ/15ML
40 SOLUTION ORAL
Status: DISCONTINUED | OUTPATIENT
Start: 2018-02-09 | End: 2018-02-11 | Stop reason: HOSPADM

## 2018-02-09 RX ORDER — IBUPROFEN 200 MG
24 TABLET ORAL
Status: DISCONTINUED | OUTPATIENT
Start: 2018-02-09 | End: 2018-02-11 | Stop reason: HOSPADM

## 2018-02-09 RX ORDER — MORPHINE SULFATE 2 MG/ML
4 INJECTION, SOLUTION INTRAMUSCULAR; INTRAVENOUS EVERY 4 HOURS PRN
Status: DISCONTINUED | OUTPATIENT
Start: 2018-02-09 | End: 2018-02-09

## 2018-02-09 RX ORDER — INSULIN ASPART 100 [IU]/ML
1-10 INJECTION, SOLUTION INTRAVENOUS; SUBCUTANEOUS
Status: DISCONTINUED | OUTPATIENT
Start: 2018-02-09 | End: 2018-02-11 | Stop reason: HOSPADM

## 2018-02-09 RX ORDER — PROMETHAZINE HYDROCHLORIDE 25 MG/1
25 TABLET ORAL
Status: COMPLETED | OUTPATIENT
Start: 2018-02-09 | End: 2018-02-09

## 2018-02-09 RX ORDER — MONTELUKAST SODIUM 10 MG/1
10 TABLET ORAL NIGHTLY
Status: DISCONTINUED | OUTPATIENT
Start: 2018-02-09 | End: 2018-02-11 | Stop reason: HOSPADM

## 2018-02-09 RX ORDER — GLIPIZIDE 5 MG/1
10 TABLET ORAL
Status: DISCONTINUED | OUTPATIENT
Start: 2018-02-09 | End: 2018-02-09

## 2018-02-09 RX ADMIN — ACETAMINOPHEN 1000 MG: 500 TABLET ORAL at 08:02

## 2018-02-09 RX ADMIN — NIFEDIPINE 60 MG: 30 TABLET, FILM COATED, EXTENDED RELEASE ORAL at 11:02

## 2018-02-09 RX ADMIN — ASPIRIN 81 MG: 81 TABLET, COATED ORAL at 11:02

## 2018-02-09 RX ADMIN — CEFTRIAXONE SODIUM 1 G: 1 INJECTION, POWDER, FOR SOLUTION INTRAMUSCULAR; INTRAVENOUS at 01:02

## 2018-02-09 RX ADMIN — METOPROLOL TARTRATE 50 MG: 50 TABLET ORAL at 11:02

## 2018-02-09 RX ADMIN — MORPHINE SULFATE 8 MG: 8 INJECTION, SOLUTION INTRAMUSCULAR; INTRAVENOUS at 06:02

## 2018-02-09 RX ADMIN — PROMETHAZINE HYDROCHLORIDE 25 MG: 25 TABLET ORAL at 05:02

## 2018-02-09 RX ADMIN — MONTELUKAST SODIUM 10 MG: 10 TABLET, FILM COATED ORAL at 08:02

## 2018-02-09 RX ADMIN — HYDRALAZINE HYDROCHLORIDE 10 MG: 20 INJECTION INTRAMUSCULAR; INTRAVENOUS at 11:02

## 2018-02-09 RX ADMIN — METOPROLOL TARTRATE 50 MG: 50 TABLET ORAL at 08:02

## 2018-02-09 RX ADMIN — ALPRAZOLAM 0.25 MG: 0.25 TABLET ORAL at 01:02

## 2018-02-09 RX ADMIN — ONDANSETRON 4 MG: 2 INJECTION INTRAMUSCULAR; INTRAVENOUS at 08:02

## 2018-02-09 RX ADMIN — SODIUM CHLORIDE: 0.9 INJECTION, SOLUTION INTRAVENOUS at 11:02

## 2018-02-09 RX ADMIN — FENOFIBRATE 160 MG: 160 TABLET ORAL at 11:02

## 2018-02-09 NOTE — ED PROVIDER NOTES
Encounter Date: 2/9/2018       History     Chief Complaint   Patient presents with    Flank Pain     c/o left flank pain radiating to abdomen with 1 episode of emesis     This patient is a 52-year-old female with past history of atrial fibrillation, TIA, diabetes mellitus, hypertension, hyperlipidemia presenting with complaints of acute onset left flank pain that began approximately one half hours prior to arrival awakening her from sleep.  She reports accompanying nausea with one episode of emesis.  She reports increased urination but states she's been taking diuretics.  She reports taking her hypertension and other medications as prescribed.  She does endorse eating Chinese food last night.  She reports one previous history of kidney infection but denies accompanying fever.  She denies trauma or overlying rash.  She reports taking Tylenol prior to arrival.          Review of patient's allergies indicates:   Allergen Reactions    Dilaudid [hydromorphone (bulk)] Nausea And Vomiting     Severe GI upset    Lortab [hydrocodone-acetaminophen] Itching     Past Medical History:   Diagnosis Date    A-fib     resolved per patient    Arthritis     Bronchitis     Cardiovascular event risk, ASCVD 10-year risk 6.7% 10/15/2016    Diabetes mellitus     Diabetes mellitus type II     Encounter for blood transfusion     History of colon polyps 11/2/2016    Hyperlipidemia     Hypertension     Stroke      Past Surgical History:   Procedure Laterality Date    COLONOSCOPY N/A 10/5/2016    Procedure: COLONOSCOPY;  Surgeon: Pillo Chanel MD;  Location: [facility];  Service: Endoscopy;  Laterality: N/A;    HERNIA REPAIR Bilateral 11/22/2016    inguinal    HYSTERECTOMY       Family History   Problem Relation Age of Onset    Hyperlipidemia Mother     Hypertension Mother     Hypertension Father     Diabetes Father     Diabetes Sister     Diabetes Sister     Diabetes Maternal Aunt     Diabetes Maternal Grandmother      Heart disease Maternal Grandmother     Cancer Paternal Grandmother      Social History   Substance Use Topics    Smoking status: Never Smoker    Smokeless tobacco: Never Used    Alcohol use No     Review of Systems   Constitutional: Negative for fever.   HENT: Negative for congestion.    Respiratory: Negative for shortness of breath.    Cardiovascular: Negative for chest pain.   Gastrointestinal: Positive for nausea.   Endocrine: Negative for polyuria.   Genitourinary: Positive for flank pain.   Musculoskeletal: Positive for back pain.   Skin: Negative for rash.   Neurological: Negative for weakness and headaches.   Hematological: Does not bruise/bleed easily.   Psychiatric/Behavioral: Negative for confusion.       Physical Exam     Initial Vitals [02/09/18 0524]   BP Pulse Resp Temp SpO2   (!) 269/132 69 16 97.7 °F (36.5 °C) 97 %      MAP       177.67         Physical Exam    Nursing note and vitals reviewed.  Constitutional: She appears well-nourished. No distress.   HENT:   Head: Normocephalic and atraumatic.   Eyes: Conjunctivae and EOM are normal.   Neck: Normal range of motion. Neck supple.   Cardiovascular: Normal rate and regular rhythm.   Pulmonary/Chest: No respiratory distress. She has no wheezes.   Abdominal: She exhibits no distension. There is tenderness. There is no rebound.   Left low flank pain to palpation without overlying skin changes, negative Max's sign, negative Gross Sanders's, negative pulsatile palpable abdominal mass   Musculoskeletal: Normal range of motion. She exhibits no edema.   Neurological: She is alert and oriented to person, place, and time.   Skin: Skin is warm and dry. Capillary refill takes less than 2 seconds. No rash noted. No erythema. No pallor.   Psychiatric: She has a normal mood and affect. Thought content normal.         ED Course   Procedures  Labs Reviewed   CBC W/ AUTO DIFFERENTIAL   COMPREHENSIVE METABOLIC PANEL   URINALYSIS     EKG Readings: (Independently  Interpreted)   Sinus rhythm with short NE, 65 bpm, left axis deviation, moderate  voltage criteria for LVH, no STEMI, no significant interval change from EKG 12/18/2017          Medical Decision Making:   Initial Assessment:   Pt was interviewed and examined emergently. VS significant marked hypertension. Initial concern for associated pyelonephritis, kidney stone disease, AA or renal artery aneurysm. Pt reported symptomatic improvement with morphine but hypertension was not affected.  ED Management:  ER workup indicates some CT findings concerning for pyelonephritis. Before urine was resulted, hypertension could be controlled and the case signed out to the oncoming physician for UA f/u and potential antibiotic administration. Pt will be placed in observation of the [facility] hospitalist for further monitoring, symptom and BP control.                   ED Course      Clinical Impression:   The primary encounter diagnosis was Pyelonephritis. Diagnoses of Accelerated hypertension, Hypertensive urgency, Hypertension associated with diabetes, and Type 2 diabetes mellitus with complication, with long-term current use of insulin were also pertinent to this visit.    Disposition:   Disposition: Placed in Observation  Condition: Tonja Pradhan MD  02/15/18 0698

## 2018-02-09 NOTE — ASSESSMENT & PLAN NOTE
Provide reassurance.  Keep pt informed of current plan of care.  Xanax 0.25mg prn ordered for anxiety.

## 2018-02-09 NOTE — PROGRESS NOTES
"Pt states she "doesn't feel good"; requests that BG be checked.  Accucheck reveals BG of 98.  Pt states she tends to drop quickly and is concerned that she is only on a clear liquid diet. Pt requests fruit juice.  Apple juice provided.  "

## 2018-02-09 NOTE — ASSESSMENT & PLAN NOTE
Continue home medications  Monitor CBG's ac/hs with SS coverage  Labetalol 20mg IV q2hr prn SBP>180  Tele monitor

## 2018-02-09 NOTE — TELEPHONE ENCOUNTER
----- Message from Raina Max sent at 2/7/2018 10:39 AM CST -----  Contact: [facility]  Patient still having leg cramps and potassium level is normal. Please call back [phone]

## 2018-02-09 NOTE — SUBJECTIVE & OBJECTIVE
Past Medical History:   Diagnosis Date    A-fib     resolved per patient    Arthritis     Bronchitis     Cardiovascular event risk, ASCVD 10-year risk 6.7% 10/15/2016    Diabetes mellitus     Diabetes mellitus type II     Encounter for blood transfusion     History of colon polyps 11/2/2016    Hyperlipidemia     Hypertension     Stroke        Past Surgical History:   Procedure Laterality Date    COLONOSCOPY N/A 10/5/2016    Procedure: COLONOSCOPY;  Surgeon: Pillo Chanel MD;  Location: [facility];  Service: Endoscopy;  Laterality: N/A;    HERNIA REPAIR Bilateral 11/22/2016    inguinal    HYSTERECTOMY         Review of patient's allergies indicates:   Allergen Reactions    Dilaudid [hydromorphone (bulk)] Nausea And Vomiting     Severe GI upset    Lortab [hydrocodone-acetaminophen] Itching       No current facility-administered medications on file prior to encounter.      Current Outpatient Prescriptions on File Prior to Encounter   Medication Sig    aspirin (ECOTRIN) 81 MG EC tablet Take 1 tablet (81 mg total) by mouth once daily.    fenofibrate 160 MG Tab Take 1 tablet (160 mg total) by mouth once daily.    furosemide (LASIX) 20 MG tablet TAKE 1 TABLET EVERY DAY    glipiZIDE (GLUCOTROL) 10 MG tablet Take 1 tablet (10 mg total) by mouth 2 (two) times daily before meals.    metoprolol tartrate (LOPRESSOR) 50 MG tablet Take 1 tablet (50 mg total) by mouth 2 (two) times daily.    montelukast (SINGULAIR) 10 mg tablet Take 1 tablet (10 mg total) by mouth every evening.    NIFEdipine (PROCARDIA-XL) 60 MG (OSM) 24 hr tablet Take 1 tablet (60 mg total) by mouth once daily.    acetaminophen (TYLENOL) 325 MG tablet Take 325 mg by mouth every 6 (six) hours as needed for Pain (headache).    albuterol 90 mcg/actuation inhaler Inhale 2 puffs into the lungs 4 (four) times daily. (Patient taking differently: Inhale 2 puffs into the lungs every 4 (four) hours as needed. )    atorvastatin (LIPITOR) 80 MG  tablet Take 1 tablet (80 mg total) by mouth once daily.    blood sugar diagnostic (ACCU-CHEK KAYLIE PLUS TEST STRP) Strp 1 strip by Misc.(Non-Drug; Combo Route) route 3 (three) times daily.    [DISCONTINUED] clorazepate (TRANXENE) 3.75 MG Tab Take 7.5 mg by mouth nightly as needed.      Family History     Problem Relation (Age of Onset)    Cancer Paternal Grandmother    Diabetes Father, Sister, Sister, Maternal Aunt, Maternal Grandmother    Heart disease Maternal Grandmother    Hyperlipidemia Mother    Hypertension Mother, Father        Social History Main Topics    Smoking status: Never Smoker    Smokeless tobacco: Never Used    Alcohol use No    Drug use: No    Sexual activity: Yes     Partners: Male     Review of Systems   Constitutional: Negative for appetite change, chills and fever.   HENT: Negative for congestion, sinus pressure and sore throat.    Respiratory: Negative for cough, shortness of breath and wheezing.    Cardiovascular: Negative for chest pain and palpitations.   Gastrointestinal: Positive for abdominal pain (LUQ), nausea and vomiting (one time at home). Negative for blood in stool, constipation and diarrhea.   Genitourinary: Positive for flank pain (left) and frequency (reports diuretic use). Negative for hematuria and urgency.   Musculoskeletal: Positive for back pain. Negative for neck pain and neck stiffness.   Skin: Negative for rash and wound.   Neurological: Negative for dizziness, weakness, light-headedness and headaches.   Psychiatric/Behavioral: Negative for agitation and confusion. The patient is nervous/anxious.      Objective:     Vital Signs (Most Recent):  Temp: 96.7 °F (35.9 °C) (02/09/18 0858)  Pulse: (!) 56 (02/09/18 0914)  Resp: 18 (02/09/18 0858)  BP: (!) 246/116 (02/09/18 0858)  SpO2: 95 % (02/09/18 0858) Vital Signs (24h Range):  Temp:  [96.7 °F (35.9 °C)-97.7 °F (36.5 °C)] 96.7 °F (35.9 °C)  Pulse:  [53-69] 56  Resp:  [16-18] 18  SpO2:  [95 %-100 %] 95 %  BP:  (215-269)/() 246/116     Weight: 89 kg (196 lb 3.4 oz)  Body mass index is 28.98 kg/m².    Physical Exam   Constitutional: She is oriented to person, place, and time. She appears well-developed and well-nourished.   HENT:   Head: Normocephalic and atraumatic.   Eyes: Conjunctivae and EOM are normal. Pupils are equal, round, and reactive to light.   Neck: Normal range of motion. Neck supple.   Cardiovascular: Normal rate, regular rhythm, normal heart sounds and intact distal pulses.    Pulmonary/Chest: Effort normal and breath sounds normal.   Abdominal: Soft. Bowel sounds are normal. There is tenderness (LUQ). There is no rebound and no guarding.   Musculoskeletal: Normal range of motion.   Neurological: She is alert and oriented to person, place, and time.   Skin: Skin is warm and dry.   Psychiatric: She has a normal mood and affect. Judgment normal.         CRANIAL NERVES     CN III, IV, VI   Pupils are equal, round, and reactive to light.  Extraocular motions are normal.        Significant Labs:   A1C:   Recent Labs  Lab 10/12/17  1352 01/18/18  1352   HGBA1C 12.8* 8.1*     CBC:   Recent Labs  Lab 02/09/18  0543   WBC 10.90   HGB 10.7*   HCT 32.5*        CMP:   Recent Labs  Lab 02/09/18  0543      K 4.9      CO2 25   *   BUN 35*   CREATININE 2.1*   CALCIUM 9.6   PROT 7.4   ALBUMIN 2.9*   BILITOT 0.2   ALKPHOS 114   AST 14   ALT 12   ANIONGAP 9   EGFRNONAA 26*     Urine Studies:   Recent Labs  Lab 02/09/18  0714   COLORU Yellow   APPEARANCEUA Clear   PHUR 7.0   SPECGRAV 1.020   PROTEINUA 3+*   GLUCUA 3+*   KETONESU Negative   BILIRUBINUA Negative   OCCULTUA 1+*   NITRITE Negative   UROBILINOGEN Negative   LEUKOCYTESUR Negative   RBCUA 3   WBCUA 4   BACTERIA Occasional   SQUAMEPITHEL 1   HYALINECASTS 0       Significant Imaging:     Leanna García #[id number] (CSN: [id number])  (52 y.o. F)  (Adm: 02/09/18)   [facility] [id number]   Radiology Results (last 7 days)     Procedure Component  Value Units Date/Time   CT Renal Stone Study ABD Pelvis WO [[id number]] Resulted: 02/09/18 0823   Order Status: Completed Updated: 02/09/18 0823   Narrative:     Comparison study: 5/3/2017  Axis scans abdomen and pelvis with and without p.o. or IV contrast and sagittal and coronal reformatted images were obtained    There are dependent hypoventilatory changes in the visualized lung bases.  Liver, spleen, adrenal glands, pancreas are unremarkable in appearance.  There are no radiopaque stones in the gallbladder CT findings of acute cholecystitis.  Abdominal aorta tapers without aneurysmal dilatation.  There is heavily calcified with suspected resistive indices.  The appendix is not seen with certainty but with no abnormal appendix or inflammatory change the appendiceal region seen.  The patient has had a prior hysterectomy the adnexa region is unremarkable appearance.  There is small fat-containing left inguinal hernia.  There is diverticulosis without CT findings of acute diverticulitis.    There is is change in the appearance of the kidneys which are diffusely enlarged with perinephric stranding and fluid.  Small calcification of the left renal hilum is most likely vascular without opaque renal stone is no hydronephrosis or opaque ureteral stone or ureteral obstruction of the findings most likely reflect pyelonephritis.  Differential is long and could include changes from obstruction even though no stone directly visualized as well as although less likely bilateral renal vein thrombosis.  Consider further evaluation with followup CT with contrast to evaluate for possible abnormal enhancement as can be seen with lobar nephronia or abscess.    There are degenerative changes in the spine and hips, sclerosis the sacroiliac joints, disc space narrowing and disc desiccation in the lower lumbar spine.   Impression:         Findings most likely representing pyelonephritis with bilateral renal enlargement and perinephric  stranding and fluid.        Final read      Electronically signed by: ANN-MARIE BURDICK MD  Date: 02/09/18  Time: 08:23    X-Ray Abdomen AP 1 View (KUB) [[id number]] Resulted: 02/09/18 0819   Order Status: Completed Updated: 02/09/18 0819   Narrative:     Abdomen supine     There is mild generalized increased amount of bowel gas in multiple nondistended air-filled loops of small bowel and with gas and stool in colon.  No typical opaque urinary or biliary Stone is seen.  Vascular calcifications   Impression:         Mildly abnormal, nonspecific, nonobstructive intestinal gas pattern.      Electronically signed by: ANN-MARIE BURDICK MD  Date: 02/09/18  Time: 08:19         X-Ray Abdomen AP 1 View (KUB) [[id number]] Resulted: 02/09/18 0819   Order Status: Completed Updated: 02/09/18 0819   Narrative:     Abdomen supine     There is mild generalized increased amount of bowel gas in multiple nondistended air-filled loops of small bowel and with gas and stool in colon.  No typical opaque urinary or biliary Stone is seen.  Vascular calcifications   Impression:         Mildly abnormal, nonspecific, nonobstructive intestinal gas pattern.

## 2018-02-09 NOTE — ASSESSMENT & PLAN NOTE
Hypertensive Urgency/ Hx uncontrolled HTN-  Continue home bblockade  Home procardia increased and patient started on ACEI   Check ultrasound to evaluate for possible renal artery stenosis  Labetalol 20mg IV q2hr prn SBP>180

## 2018-02-09 NOTE — ED NOTES
Pt presents to ER for evaluation of left sided flank pain that woke her up out of sleep this morning. Associated symptoms include 1 episode of vomiting. Pt denies urinary symptoms and has never had a kidney stone. Pt also endorse hypertension, 265/134. Rates pain 8/10. Pt in bed, locked, lowest position, side rails up, hooked to continuous NIBP, pulse ox, heart monitor, will continue to monitor.

## 2018-02-09 NOTE — CONSULTS
Nephrology Consult Note        Patient Name: Leanna García  MRN: [id number]    Patient Class: OP- Observation   Admission Date: 2/9/2018  Length of Stay: 0 days    Attending Physician: Arslan Snyder MD  Primary Care Provider: Nolberto Lomax MD    Reason for Consult: CKD, possible pyelonephritis    SUBJECTIVE:     HPI: 52F with h/o uncontrolled DM with neuropathy, DJD with back and feet pains, HTN, CKD 4 with baseline sCr 2-2.5, followed by Dr. Carlson is admitted for evaluation and management of acute onset, unprovoked, severe, unremitting, LEFT back and flank pain, somewhat related to position, responded well to narcotics in ED. She describes associated nausea and vomiting x1, but notably no dysuria, gross hematuria, foul odor, fevers/chills, diarrhea/constipation. She had an episode of kidney infection long time ago and this is slightly reminiscent to it, per patient. She also describes multiple other complains, related to LE weakness, cramps and pains of more chronic duration, as well as intermittent LE swelling and SOB. CT w/o contrast shows non-specific BILATERAL perinephric stranding and some fluid collection, no stones or hydro. Retroperitoneal US is pending. She feels well, comfortable, receiving Rocephin and BP is better controlled.    Past Medical History:   Diagnosis Date    A-fib     resolved per patient    Arthritis     Bronchitis     Cardiovascular event risk, ASCVD 10-year risk 6.7% 10/15/2016    Diabetes mellitus     Diabetes mellitus type II     Encounter for blood transfusion     History of colon polyps 11/2/2016    Hyperlipidemia     Hypertension     Stroke      Past Surgical History:   Procedure Laterality Date    COLONOSCOPY N/A 10/5/2016    Procedure: COLONOSCOPY;  Surgeon: Pillo Chanel MD;  Location: [facility];  Service: Endoscopy;  Laterality: N/A;    HERNIA REPAIR Bilateral 11/22/2016    inguinal    HYSTERECTOMY       Family History   Problem Relation Age of Onset     Hyperlipidemia Mother     Hypertension Mother     Hypertension Father     Diabetes Father     Diabetes Sister     Diabetes Sister     Diabetes Maternal Aunt     Diabetes Maternal Grandmother     Heart disease Maternal Grandmother     Cancer Paternal Grandmother      Social History   Substance Use Topics    Smoking status: Never Smoker    Smokeless tobacco: Never Used    Alcohol use No       Review of patient's allergies indicates:   Allergen Reactions    Dilaudid [hydromorphone (bulk)] Nausea And Vomiting     Severe GI upset    Lortab [hydrocodone-acetaminophen] Itching       Outpatient meds:  No current facility-administered medications on file prior to encounter.      Current Outpatient Prescriptions on File Prior to Encounter   Medication Sig Dispense Refill    aspirin (ECOTRIN) 81 MG EC tablet Take 1 tablet (81 mg total) by mouth once daily.      fenofibrate 160 MG Tab Take 1 tablet (160 mg total) by mouth once daily. 90 tablet 3    furosemide (LASIX) 20 MG tablet TAKE 1 TABLET EVERY DAY 90 tablet 1    glipiZIDE (GLUCOTROL) 10 MG tablet Take 1 tablet (10 mg total) by mouth 2 (two) times daily before meals. 180 tablet 3    metoprolol tartrate (LOPRESSOR) 50 MG tablet Take 1 tablet (50 mg total) by mouth 2 (two) times daily. 180 tablet 1    montelukast (SINGULAIR) 10 mg tablet Take 1 tablet (10 mg total) by mouth every evening. 90 tablet 1    NIFEdipine (PROCARDIA-XL) 60 MG (OSM) 24 hr tablet Take 1 tablet (60 mg total) by mouth once daily. 90 tablet 3    acetaminophen (TYLENOL) 325 MG tablet Take 325 mg by mouth every 6 (six) hours as needed for Pain (headache).      albuterol 90 mcg/actuation inhaler Inhale 2 puffs into the lungs 4 (four) times daily. (Patient taking differently: Inhale 2 puffs into the lungs every 4 (four) hours as needed. ) 1 Inhaler 1    atorvastatin (LIPITOR) 80 MG tablet Take 1 tablet (80 mg total) by mouth once daily. 90 tablet 3    blood sugar diagnostic  (ACCU-CHEK KAYLIE PLUS TEST STRP) Strp 1 strip by Misc.(Non-Drug; Combo Route) route 3 (three) times daily. 300 strip 3    [DISCONTINUED] clorazepate (TRANXENE) 3.75 MG Tab Take 7.5 mg by mouth nightly as needed.          Scheduled meds:   aspirin  81 mg Oral Daily    cefTRIAXone (ROCEPHIN) IVPB  1 g Intravenous Q24H    fenofibrate  160 mg Oral Daily    metoprolol tartrate  50 mg Oral BID    montelukast  10 mg Oral QHS    NIFEdipine  60 mg Oral Daily       Infusions:   sodium chloride 0.9% 125 mL/hr at 02/09/18 1146       PRN meds:  albuterol sulfate, ALPRAZolam, dextrose 50%, dextrose 50%, glucagon (human recombinant), glucose, glucose, insulin aspart, labetalol, magnesium oxide, magnesium oxide, ondansetron, potassium chloride 10%, potassium chloride 10%, ramelteon, sodium chloride 0.9%    Review of Systems:  Review of Systems   Constitutional: Positive for malaise/fatigue. Negative for chills, fever and weight loss.   HENT: Negative for hearing loss and nosebleeds.    Eyes: Negative for blurred vision, double vision and photophobia.   Respiratory: Positive for shortness of breath. Negative for cough and wheezing.    Cardiovascular: Positive for leg swelling. Negative for chest pain and palpitations.   Gastrointestinal: Negative for abdominal pain, blood in stool, constipation, diarrhea, heartburn, melena, nausea and vomiting.   Genitourinary: Negative for dysuria, frequency, hematuria and urgency.   Musculoskeletal: Positive for back pain and myalgias. Negative for falls and joint pain.   Skin: Negative for itching and rash.   Neurological: Negative for dizziness, speech change, focal weakness, loss of consciousness and headaches.   Endo/Heme/Allergies: Does not bruise/bleed easily.   Psychiatric/Behavioral: Negative for depression and substance abuse. The patient is not nervous/anxious.        OBJECTIVE:     Vital Signs and IO (Last 24H):  Temp:  [96.7 °F (35.9 °C)-98 °F (36.7 °C)]   Pulse:  [53-69]    Resp:  [16-18]   BP: (159-269)/()   SpO2:  [95 %-100 %]   No intake/output data recorded.    Wt Readings from Last 5 Encounters:   02/09/18 89 kg (196 lb 3.4 oz)   01/18/18 91.8 kg (202 lb 6.1 oz)   01/09/18 82.1 kg (181 lb)   12/23/17 82.1 kg (181 lb)   12/18/17 88.8 kg (195 lb 12.3 oz)         Physical Exam:  Physical Exam   Constitutional: She is oriented to person, place, and time. She appears well-developed and well-nourished.   HENT:   Head: Normocephalic and atraumatic.   Mouth/Throat: Oropharynx is clear and moist.   Eyes: EOM are normal. Pupils are equal, round, and reactive to light. No scleral icterus.   Neck: Neck supple.   Cardiovascular: Normal rate and regular rhythm.    Pulmonary/Chest: Effort normal. No stridor. No respiratory distress.   Abdominal: Soft. She exhibits no distension.   Musculoskeletal: Normal range of motion. She exhibits edema. She exhibits no deformity.   Neurological: She is alert and oriented to person, place, and time. No cranial nerve deficit.   Skin: Skin is warm and dry. No rash noted. She is not diaphoretic. No erythema.   Psychiatric: She has a normal mood and affect. Her behavior is normal.       Body mass index is 28.98 kg/m².    Laboratory:    Recent Labs  Lab 02/09/18  0543      K 4.9      CO2 25   BUN 35*   CREATININE 2.1*   ESTGFRAFRICA 31*   EGFRNONAA 26*   CALCIUM 9.6   ALBUMIN 2.9*         Recent Labs  Lab 02/09/18  0543   WBC 10.90   HGB 10.7*   HCT 32.5*      MCV 80*   MCHC 33.0   MONO 6.6  0.7         Recent Labs  Lab 02/09/18  0543   ALKPHOS 114   BILITOT 0.2   PROT 7.4   ALBUMIN 2.9*   ALT 12   AST 14     CT-abdomen:  There is is change in the appearance of the kidneys which are diffusely enlarged with perinephric stranding and fluid. Small calcification of the left renal hilum is most likely vascular without opaque renal stone is no hydronephrosis or opaque ureteral stone or ureteral obstruction of the findings most likely reflect  pyelonephritis.  Differential is long and could include changes from obstruction even though no stone directly visualized as well as although less likely bilateral renal vein thrombosis.    Renal US:  There is increased echogenicity from both kidneys and elevated resistive index from the left kidney suggesting intrinsic renal disease. No hydronephrosis or obstruction is identified.      ASSESSMENT/PLAN:     Active Hospital Problems    Diagnosis  POA    *Accelerated hypertension [I10]  Yes    Familial hypercholesteremia, baseline  [E78.01]  Yes    Iron deficiency anemia secondary to inadequate dietary iron intake [D50.8]  Yes    Obesity (BMI 30.0-34.9) [E66.9]  Yes    MANJINDER (generalized anxiety disorder) [F41.1]  Yes    CKD (chronic kidney disease) stage 3, GFR 30-59 ml/min [N18.3]  Yes    Hypertension associated with diabetes [E11.59, I10]  Yes     Chronic    Hyperlipidemia associated with type 2 diabetes mellitus [E11.69, E78.5]  Yes    Uncontrolled type 2 diabetes mellitus with stage 3 chronic kidney disease, without long-term current use of insulin [E11.22, E11.65, N18.3]  Yes      Resolved Hospital Problems    Diagnosis Date Resolved POA   No resolved problems to display.       CKD stage 4, bl sCr 2-2.5.  Hematuria, microscopy, chronic, unclear etiology.  Proteinuria, nephrotic range, negative prior work-up, likely DN.  Possible UTI/pyelonephritis, on abx, pending cultures.  Unilateral back and flank pain.  Flank pain etiology uncertain - lack of fever, significant hematuria or other UA changes consistent with UTI, lack of stones or hydro, as well as acuity and manner of presentation - make pyelonephritis/nephrolitiasis less likely. Danielle-nephric stranding and fluid is relatively non-specific sign. I do not oppose treating with abx as UTI, until Cx results are back. I feel muskulo-skeletal etiology of pain seems most likely. However, renal infarct and/or vein thrombosis can have similar  presentation. If her symptoms resolve and she improves, I don't think she will needs more renal work-up. Otherwise, we may doppler US or renal vasculature.    No NSAIDs, ACEI/ARB, IV contrast or other nephrotoxins.  Keep MAP > 60, SBP > 100.  Dose meds for GFR < 30 ml/min.  Renal diet - low K, low phos.    Anemia of CKD  Stable. Monitor.  No need for FERMÍN.  No need for IV iron.    HTN  Controlled.   Tolerate asymptomatic HTN up to -160.  Continue home meds. Consider diuretics only on PRN basis for now.  Low sodium diet.    Thank you for allowing us to participate in the care of your patient!   We will follow the patient and provide recommendations as needed.    Bryce Craig MD    [facility]  [address] [place] [address] [place] [address]    [phone] - tel  [phone] - fax    2/9/2018

## 2018-02-09 NOTE — H&P
[facility] Hospital Medicine  History & Physical    Patient Name: Leanna García  MRN: [id number]  Admission Date: 2/9/2018  Attending Physician: CARMELA Vieira  Primary Care Provider: Nolberto Lomax MD         Patient information was obtained from patient and ER records.     Subjective:     Principal Problem:Accelerated hypertension    Chief Complaint:   Chief Complaint   Patient presents with    Flank Pain     c/o left flank pain radiating to abdomen with 1 episode of emesis        HPI: Leanna García is a 51 y/o female with uncontrolled DM type 2 with CKD stage 4, HLD, and HTN, past history atrial fibrillation, presented to the ED with sudden onset L flank pain, described as sharp and constant, which awakened her from sleep early this morning.  She did experience nausea and had one episode of vomiting, but denies fever, chest pain, palpitations, SOB, weakness, diarrhea or constipation.  Denies trauma; denies new or unusual rash.  Pain was unrelieved by tylenol.  States that her BP was high, so she took her home meds this morning prior to arrival to the emergency department.  Currently, blood pressure remains elevated; however, she is asymptomatic.  She was given morphine IV in the ED and states pain level is currently 3/10; nausea has subsided.    Past Medical History:   Diagnosis Date    A-fib     resolved per patient    Arthritis     Bronchitis     Cardiovascular event risk, ASCVD 10-year risk 6.7% 10/15/2016    Diabetes mellitus     Diabetes mellitus type II     Encounter for blood transfusion     History of colon polyps 11/2/2016    Hyperlipidemia     Hypertension     Stroke        Past Surgical History:   Procedure Laterality Date    COLONOSCOPY N/A 10/5/2016    Procedure: COLONOSCOPY;  Surgeon: Pillo Chanel MD;  Location: [facility];  Service: Endoscopy;  Laterality: N/A;    HERNIA REPAIR Bilateral 11/22/2016    inguinal    HYSTERECTOMY         Review  of patient's allergies indicates:   Allergen Reactions    Dilaudid [hydromorphone (bulk)] Nausea And Vomiting     Severe GI upset    Lortab [hydrocodone-acetaminophen] Itching       No current facility-administered medications on file prior to encounter.      Current Outpatient Prescriptions on File Prior to Encounter   Medication Sig    aspirin (ECOTRIN) 81 MG EC tablet Take 1 tablet (81 mg total) by mouth once daily.    fenofibrate 160 MG Tab Take 1 tablet (160 mg total) by mouth once daily.    furosemide (LASIX) 20 MG tablet TAKE 1 TABLET EVERY DAY    glipiZIDE (GLUCOTROL) 10 MG tablet Take 1 tablet (10 mg total) by mouth 2 (two) times daily before meals.    metoprolol tartrate (LOPRESSOR) 50 MG tablet Take 1 tablet (50 mg total) by mouth 2 (two) times daily.    montelukast (SINGULAIR) 10 mg tablet Take 1 tablet (10 mg total) by mouth every evening.    NIFEdipine (PROCARDIA-XL) 60 MG (OSM) 24 hr tablet Take 1 tablet (60 mg total) by mouth once daily.    acetaminophen (TYLENOL) 325 MG tablet Take 325 mg by mouth every 6 (six) hours as needed for Pain (headache).    albuterol 90 mcg/actuation inhaler Inhale 2 puffs into the lungs 4 (four) times daily. (Patient taking differently: Inhale 2 puffs into the lungs every 4 (four) hours as needed. )    atorvastatin (LIPITOR) 80 MG tablet Take 1 tablet (80 mg total) by mouth once daily.    blood sugar diagnostic (ACCU-CHEK KAYLIE PLUS TEST STRP) Strp 1 strip by Misc.(Non-Drug; Combo Route) route 3 (three) times daily.    [DISCONTINUED] clorazepate (TRANXENE) 3.75 MG Tab Take 7.5 mg by mouth nightly as needed.      Family History     Problem Relation (Age of Onset)    Cancer Paternal Grandmother    Diabetes Father, Sister, Sister, Maternal Aunt, Maternal Grandmother    Heart disease Maternal Grandmother    Hyperlipidemia Mother    Hypertension Mother, Father        Social History Main Topics    Smoking status: Never Smoker    Smokeless tobacco: Never Used     Alcohol use No    Drug use: No    Sexual activity: Yes     Partners: Male     Review of Systems   Constitutional: Negative for appetite change, chills and fever.   HENT: Negative for congestion, sinus pressure and sore throat.    Respiratory: Negative for cough, shortness of breath and wheezing.    Cardiovascular: Negative for chest pain and palpitations.   Gastrointestinal: Positive for abdominal pain (LUQ), nausea and vomiting (one time at home). Negative for blood in stool, constipation and diarrhea.   Genitourinary: Positive for flank pain (left) and frequency (reports diuretic use). Negative for hematuria and urgency.   Musculoskeletal: Positive for back pain. Negative for neck pain and neck stiffness.   Skin: Negative for rash and wound.   Neurological: Negative for dizziness, weakness, light-headedness and headaches.   Psychiatric/Behavioral: Negative for agitation and confusion. The patient is nervous/anxious.      Objective:     Vital Signs (Most Recent):  Temp: 96.7 °F (35.9 °C) (02/09/18 0858)  Pulse: (!) 56 (02/09/18 0914)  Resp: 18 (02/09/18 0858)  BP: (!) 246/116 (02/09/18 0858)  SpO2: 95 % (02/09/18 0858) Vital Signs (24h Range):  Temp:  [96.7 °F (35.9 °C)-97.7 °F (36.5 °C)] 96.7 °F (35.9 °C)  Pulse:  [53-69] 56  Resp:  [16-18] 18  SpO2:  [95 %-100 %] 95 %  BP: (215-269)/() 246/116     Weight: 89 kg (196 lb 3.4 oz)  Body mass index is 28.98 kg/m².    Physical Exam   Constitutional: She is oriented to person, place, and time. She appears well-developed and well-nourished.   HENT:   Head: Normocephalic and atraumatic.   Eyes: Conjunctivae and EOM are normal. Pupils are equal, round, and reactive to light.   Neck: Normal range of motion. Neck supple.   Cardiovascular: Normal rate, regular rhythm, normal heart sounds and intact distal pulses.    Pulmonary/Chest: Effort normal and breath sounds normal.   Abdominal: Soft. Bowel sounds are normal. There is tenderness (LUQ). There is no rebound and no  guarding.   Musculoskeletal: Normal range of motion.   Neurological: She is alert and oriented to person, place, and time.   Skin: Skin is warm and dry.   Psychiatric: She has a normal mood and affect. Judgment normal.         CRANIAL NERVES     CN III, IV, VI   Pupils are equal, round, and reactive to light.  Extraocular motions are normal.        Significant Labs:   A1C:   Recent Labs  Lab 10/12/17  1352 01/18/18  1352   HGBA1C 12.8* 8.1*     CBC:   Recent Labs  Lab 02/09/18  0543   WBC 10.90   HGB 10.7*   HCT 32.5*        CMP:   Recent Labs  Lab 02/09/18  0543      K 4.9      CO2 25   *   BUN 35*   CREATININE 2.1*   CALCIUM 9.6   PROT 7.4   ALBUMIN 2.9*   BILITOT 0.2   ALKPHOS 114   AST 14   ALT 12   ANIONGAP 9   EGFRNONAA 26*     Urine Studies:   Recent Labs  Lab 02/09/18  0714   COLORU Yellow   APPEARANCEUA Clear   PHUR 7.0   SPECGRAV 1.020   PROTEINUA 3+*   GLUCUA 3+*   KETONESU Negative   BILIRUBINUA Negative   OCCULTUA 1+*   NITRITE Negative   UROBILINOGEN Negative   LEUKOCYTESUR Negative   RBCUA 3   WBCUA 4   BACTERIA Occasional   SQUAMEPITHEL 1   HYALINECASTS 0       Significant Imaging:     Raquel Leanna Cipriano #[id number] (CSN: [id number])  (52 y.o. F)  (Adm: 02/09/18)   [facility] [id number]   Radiology Results (last 7 days)     Procedure Component Value Units Date/Time   CT Renal Stone Study ABD Pelvis WO [[id number]] Resulted: 02/09/18 0823   Order Status: Completed Updated: 02/09/18 0823   Narrative:     Comparison study: 5/3/2017  Axis scans abdomen and pelvis with and without p.o. or IV contrast and sagittal and coronal reformatted images were obtained    There are dependent hypoventilatory changes in the visualized lung bases.  Liver, spleen, adrenal glands, pancreas are unremarkable in appearance.  There are no radiopaque stones in the gallbladder CT findings of acute cholecystitis.  Abdominal aorta tapers without aneurysmal dilatation.  There is heavily calcified with suspected  resistive indices.  The appendix is not seen with certainty but with no abnormal appendix or inflammatory change the appendiceal region seen.  The patient has had a prior hysterectomy the adnexa region is unremarkable appearance.  There is small fat-containing left inguinal hernia.  There is diverticulosis without CT findings of acute diverticulitis.    There is is change in the appearance of the kidneys which are diffusely enlarged with perinephric stranding and fluid.  Small calcification of the left renal hilum is most likely vascular without opaque renal stone is no hydronephrosis or opaque ureteral stone or ureteral obstruction of the findings most likely reflect pyelonephritis.  Differential is long and could include changes from obstruction even though no stone directly visualized as well as although less likely bilateral renal vein thrombosis.  Consider further evaluation with followup CT with contrast to evaluate for possible abnormal enhancement as can be seen with lobar nephronia or abscess.    There are degenerative changes in the spine and hips, sclerosis the sacroiliac joints, disc space narrowing and disc desiccation in the lower lumbar spine.   Impression:         Findings most likely representing pyelonephritis with bilateral renal enlargement and perinephric stranding and fluid.        Final read      Electronically signed by: ANN-MARIE BURDICK MD  Date: 02/09/18  Time: 08:23    X-Ray Abdomen AP 1 View (KUB) [[id number]] Resulted: 02/09/18 0819   Order Status: Completed Updated: 02/09/18 0819   Narrative:     Abdomen supine     There is mild generalized increased amount of bowel gas in multiple nondistended air-filled loops of small bowel and with gas and stool in colon.  No typical opaque urinary or biliary Stone is seen.  Vascular calcifications   Impression:         Mildly abnormal, nonspecific, nonobstructive intestinal gas pattern.      Electronically signed by: ANN-MARIE BURDICK MD  Date:  02/09/18  Time: 08:19         X-Ray Abdomen AP 1 View (KUB) [[id number]] Resulted: 02/09/18 0819   Order Status: Completed Updated: 02/09/18 0819   Narrative:     Abdomen supine     There is mild generalized increased amount of bowel gas in multiple nondistended air-filled loops of small bowel and with gas and stool in colon.  No typical opaque urinary or biliary Stone is seen.  Vascular calcifications   Impression:         Mildly abnormal, nonspecific, nonobstructive intestinal gas pattern.         Assessment/Plan:     * Accelerated hypertension    Tele monitor  VS q4hr  Continue home meds  Labetalol 20mg IV prn SBP >180            Uncontrolled type 2 diabetes mellitus with stage 3 chronic kidney disease, without long-term current use of insulin    IV hydration  BMP in AM  CBG's AC/HS with SS coverage.          Familial hypercholesteremia, baseline     Continue current home med  Consult nutrition for dietary teaching.        Iron deficiency anemia secondary to inadequate dietary iron intake    Likely 2/2 CKD stage 4.          MANJINDER (generalized anxiety disorder)    Provide reassurance.  Keep pt informed of current plan of care.  Xanax 0.25mg prn ordered for anxiety.          CKD (chronic kidney disease) stage 3, GFR 30-59 ml/min    Consult to nephrology.          Hypertension associated with diabetes    Continue home medications  Monitor CBG's ac/hs with SS coverage  Labetalol 20mg IV q2hr prn SBP>180  Tele monitor          Hyperlipidemia associated with type 2 diabetes mellitus    Continue fenofibrate.            VTE Risk Mitigation         Ordered     Medium Risk of VTE  Once      02/09/18 1016             CARMELA Vieira  Department of Hospital Medicine   [facility]

## 2018-02-10 PROBLEM — R09.02 HYPOXEMIA: Status: ACTIVE | Noted: 2018-02-10

## 2018-02-10 PROBLEM — I10 HYPERTENSION, UNCONTROLLED: Status: ACTIVE | Noted: 2018-02-10

## 2018-02-10 PROBLEM — N12 PYELONEPHRITIS: Status: ACTIVE | Noted: 2018-02-10

## 2018-02-10 PROBLEM — I16.0 HYPERTENSIVE URGENCY: Status: ACTIVE | Noted: 2018-02-09

## 2018-02-10 PROBLEM — J98.11 ATELECTASIS: Status: ACTIVE | Noted: 2018-02-10

## 2018-02-10 LAB
ANION GAP SERPL CALC-SCNC: 10 MMOL/L
BUN SERPL-MCNC: 34 MG/DL
CALCIUM SERPL-MCNC: 8.5 MG/DL
CHLORIDE SERPL-SCNC: 106 MMOL/L
CO2 SERPL-SCNC: 24 MMOL/L
CREAT SERPL-MCNC: 2.4 MG/DL
CRP SERPL-MCNC: 5.2 MG/L
EST. GFR  (AFRICAN AMERICAN): 26 ML/MIN/1.73 M^2
EST. GFR  (NON AFRICAN AMERICAN): 23 ML/MIN/1.73 M^2
GLUCOSE SERPL-MCNC: 131 MG/DL
POCT GLUCOSE: 155 MG/DL (ref 70–110)
POCT GLUCOSE: 174 MG/DL (ref 70–110)
POCT GLUCOSE: 253 MG/DL (ref 70–110)
POTASSIUM SERPL-SCNC: 4.6 MMOL/L
SODIUM SERPL-SCNC: 140 MMOL/L

## 2018-02-10 PROCEDURE — 25000003 PHARM REV CODE 250: Performed by: HOSPITALIST

## 2018-02-10 PROCEDURE — 96361 HYDRATE IV INFUSION ADD-ON: CPT

## 2018-02-10 PROCEDURE — 63600175 PHARM REV CODE 636 W HCPCS: Performed by: HOSPITALIST

## 2018-02-10 PROCEDURE — 80048 BASIC METABOLIC PNL TOTAL CA: CPT

## 2018-02-10 PROCEDURE — 96376 TX/PRO/DX INJ SAME DRUG ADON: CPT

## 2018-02-10 PROCEDURE — G0378 HOSPITAL OBSERVATION PER HR: HCPCS

## 2018-02-10 PROCEDURE — 94799 UNLISTED PULMONARY SVC/PX: CPT

## 2018-02-10 PROCEDURE — 96372 THER/PROPH/DIAG INJ SC/IM: CPT

## 2018-02-10 PROCEDURE — 25000003 PHARM REV CODE 250: Performed by: NURSE PRACTITIONER

## 2018-02-10 PROCEDURE — 63600175 PHARM REV CODE 636 W HCPCS: Performed by: NURSE PRACTITIONER

## 2018-02-10 PROCEDURE — 36415 COLL VENOUS BLD VENIPUNCTURE: CPT

## 2018-02-10 PROCEDURE — 99900035 HC TECH TIME PER 15 MIN (STAT)

## 2018-02-10 PROCEDURE — 86140 C-REACTIVE PROTEIN: CPT

## 2018-02-10 PROCEDURE — 82962 GLUCOSE BLOOD TEST: CPT | Mod: 91

## 2018-02-10 PROCEDURE — 96366 THER/PROPH/DIAG IV INF ADDON: CPT

## 2018-02-10 PROCEDURE — 63700000 PHARM REV CODE 250 ALT 637 W/O HCPCS: Performed by: NURSE PRACTITIONER

## 2018-02-10 RX ORDER — METOPROLOL SUCCINATE 50 MG/1
50 TABLET, EXTENDED RELEASE ORAL DAILY
Status: DISCONTINUED | OUTPATIENT
Start: 2018-02-10 | End: 2018-02-11 | Stop reason: HOSPADM

## 2018-02-10 RX ORDER — NIFEDIPINE 30 MG/1
90 TABLET, EXTENDED RELEASE ORAL DAILY
Status: DISCONTINUED | OUTPATIENT
Start: 2018-02-11 | End: 2018-02-11 | Stop reason: HOSPADM

## 2018-02-10 RX ORDER — LABETALOL HYDROCHLORIDE 5 MG/ML
10 INJECTION, SOLUTION INTRAVENOUS
Status: DISCONTINUED | OUTPATIENT
Start: 2018-02-10 | End: 2018-02-11 | Stop reason: HOSPADM

## 2018-02-10 RX ORDER — LISINOPRIL 10 MG/1
10 TABLET ORAL DAILY
Status: DISCONTINUED | OUTPATIENT
Start: 2018-02-10 | End: 2018-02-11 | Stop reason: HOSPADM

## 2018-02-10 RX ORDER — AMLODIPINE BESYLATE 5 MG/1
5 TABLET ORAL DAILY
Status: DISCONTINUED | OUTPATIENT
Start: 2018-02-10 | End: 2018-02-10

## 2018-02-10 RX ORDER — ALBUTEROL SULFATE 90 UG/1
2 AEROSOL, METERED RESPIRATORY (INHALATION) EVERY 4 HOURS PRN
Status: DISCONTINUED | OUTPATIENT
Start: 2018-02-10 | End: 2018-02-10 | Stop reason: SDUPTHER

## 2018-02-10 RX ORDER — HYDRALAZINE HYDROCHLORIDE 10 MG/1
10 TABLET, FILM COATED ORAL ONCE
Status: COMPLETED | OUTPATIENT
Start: 2018-02-10 | End: 2018-02-10

## 2018-02-10 RX ORDER — FERROUS SULFATE 325(65) MG
325 TABLET, DELAYED RELEASE (ENTERIC COATED) ORAL 2 TIMES DAILY WITH MEALS
Status: DISCONTINUED | OUTPATIENT
Start: 2018-02-10 | End: 2018-02-11 | Stop reason: HOSPADM

## 2018-02-10 RX ORDER — ASCORBIC ACID 500 MG
500 TABLET ORAL NIGHTLY
Status: DISCONTINUED | OUTPATIENT
Start: 2018-02-10 | End: 2018-02-11 | Stop reason: HOSPADM

## 2018-02-10 RX ORDER — ATORVASTATIN CALCIUM 40 MG/1
80 TABLET, FILM COATED ORAL DAILY
Status: DISCONTINUED | OUTPATIENT
Start: 2018-02-10 | End: 2018-02-11 | Stop reason: HOSPADM

## 2018-02-10 RX ORDER — HYDRALAZINE HYDROCHLORIDE 10 MG/1
10 TABLET, FILM COATED ORAL EVERY 12 HOURS
Status: DISCONTINUED | OUTPATIENT
Start: 2018-02-10 | End: 2018-02-10

## 2018-02-10 RX ORDER — ENOXAPARIN SODIUM 100 MG/ML
30 INJECTION SUBCUTANEOUS EVERY 24 HOURS
Status: DISCONTINUED | OUTPATIENT
Start: 2018-02-10 | End: 2018-02-11 | Stop reason: HOSPADM

## 2018-02-10 RX ORDER — NIFEDIPINE 30 MG/1
30 TABLET, EXTENDED RELEASE ORAL ONCE
Status: COMPLETED | OUTPATIENT
Start: 2018-02-10 | End: 2018-02-10

## 2018-02-10 RX ADMIN — ENOXAPARIN SODIUM 30 MG: 100 INJECTION SUBCUTANEOUS at 04:02

## 2018-02-10 RX ADMIN — METOPROLOL TARTRATE 50 MG: 50 TABLET ORAL at 08:02

## 2018-02-10 RX ADMIN — INSULIN ASPART 3 UNITS: 100 INJECTION, SOLUTION INTRAVENOUS; SUBCUTANEOUS at 09:02

## 2018-02-10 RX ADMIN — FERROUS SULFATE TAB EC 325 MG (65 MG FE EQUIVALENT) 325 MG: 325 (65 FE) TABLET DELAYED RESPONSE at 04:02

## 2018-02-10 RX ADMIN — OXYCODONE HYDROCHLORIDE AND ACETAMINOPHEN 500 MG: 500 TABLET ORAL at 09:02

## 2018-02-10 RX ADMIN — ATORVASTATIN CALCIUM 80 MG: 40 TABLET, FILM COATED ORAL at 10:02

## 2018-02-10 RX ADMIN — METOPROLOL SUCCINATE 50 MG: 50 TABLET, EXTENDED RELEASE ORAL at 04:02

## 2018-02-10 RX ADMIN — NIFEDIPINE 60 MG: 30 TABLET, FILM COATED, EXTENDED RELEASE ORAL at 08:02

## 2018-02-10 RX ADMIN — ACETAMINOPHEN 1000 MG: 500 TABLET ORAL at 08:02

## 2018-02-10 RX ADMIN — LISINOPRIL 10 MG: 10 TABLET ORAL at 04:02

## 2018-02-10 RX ADMIN — INSULIN ASPART 2 UNITS: 100 INJECTION, SOLUTION INTRAVENOUS; SUBCUTANEOUS at 12:02

## 2018-02-10 RX ADMIN — CEFTRIAXONE SODIUM 1 G: 1 INJECTION, POWDER, FOR SOLUTION INTRAMUSCULAR; INTRAVENOUS at 12:02

## 2018-02-10 RX ADMIN — INSULIN ASPART 2 UNITS: 100 INJECTION, SOLUTION INTRAVENOUS; SUBCUTANEOUS at 04:02

## 2018-02-10 RX ADMIN — NIFEDIPINE 30 MG: 30 TABLET, FILM COATED, EXTENDED RELEASE ORAL at 10:02

## 2018-02-10 RX ADMIN — SODIUM CHLORIDE: 0.9 INJECTION, SOLUTION INTRAVENOUS at 03:02

## 2018-02-10 RX ADMIN — HYDRALAZINE HYDROCHLORIDE 10 MG: 10 TABLET, FILM COATED ORAL at 04:02

## 2018-02-10 RX ADMIN — ASPIRIN 81 MG: 81 TABLET, COATED ORAL at 08:02

## 2018-02-10 RX ADMIN — MONTELUKAST SODIUM 10 MG: 10 TABLET, FILM COATED ORAL at 09:02

## 2018-02-10 RX ADMIN — FENOFIBRATE 160 MG: 160 TABLET ORAL at 08:02

## 2018-02-10 NOTE — ASSESSMENT & PLAN NOTE
Atelectasis-  Staff nurse reported O2 sats down to 88 % on room air earlier today  Pt denies any complaints of SOB  Cxr shows atelectasis  Add I.S. q 1 hour while awake  HL IVF

## 2018-02-10 NOTE — PLAN OF CARE
02/10/18 0759   Patient Assessment/Suction   Level of Consciousness (AVPU) alert   All Lung Fields Breath Sounds clear   PRE-TX-O2-ETCO2   O2 Device (Oxygen Therapy) room air   SpO2 (!) 94 %   Pulse 68   Resp 16   Aerosol Therapy   $ Aerosol Therapy Charges PRN treatment not required

## 2018-02-10 NOTE — HOSPITAL COURSE
The patient was monitored closely during her stay. She was given IVF for hydration. A urine culture was sent to the lab.  Nephrology was consulted. Patient was continued on Iv rocephin for possible pyelonephritis. She was noted to have hypertensive urgency. Her IVF were discontinued and she received titration of her antihypertensives. Patient also had some mild hypoxemia on room air. A Cxr was done which showed atelectasis. Patient was given an I.S. and use was encouraged. Patient's saturations monitored closely and no further hypoxemia noted. Patient discussed with Dr. Snyder and ACEI  was added. A renal artery ultrasound was ordered and no renal artery stenosis noted. Patient's initial symptoms resolved. Her overall condition remained stable and improved and she was discharged to home after cleared by Nephrology. Her prior [facility] was continued and blood pressure, blood glucose levels, volume status, and labs to be monitored further as outpatient. Patient also in hospital recently with CHF and was instructed to follow up with cardiology.

## 2018-02-10 NOTE — PLAN OF CARE
02/10/18 1551   Patient Assessment/Suction   Level of Consciousness (AVPU) alert   All Lung Fields Breath Sounds clear   PRE-TX-O2-ETCO2   O2 Device (Oxygen Therapy) room air   SpO2 (!) 94 %   Pulse 66   Resp 18   Temp 96.6 °F (35.9 °C)   BP (!) 155/72   Aerosol Therapy   $ Aerosol Therapy Charges PRN treatment not required   Incentive Spirometer   $ Incentive Spirometer Charges done with encouragement   Predicted Level (mL) (Incentive Spirometer) 2500   Administration (Incentive Spirometer) done with encouragement   Number of Repetitions (Incentive Spirometer) 10   Level (mL) (Incentive Spirometer) 1500   Patient Tolerance good

## 2018-02-10 NOTE — ASSESSMENT & PLAN NOTE
DM diet  Accuchecks with correctional SSI    HGA1C is 7.5  Blood sugars running   Home glucotrol currently on hold

## 2018-02-10 NOTE — PROGRESS NOTES
Progress Note  Nephrology    Admit Date: 2/9/2018   LOS: 0 days     SUBJECTIVE:     CC:  Back/flank pain     HPI: 52F with h/o uncontrolled DM with neuropathy, DJD with back and feet pains, HTN, CKD 4 with baseline sCr 2-2.5, followed by Dr. Carlson is admitted for evaluation and management of acute onset, unprovoked, severe, unremitting, LEFT back and flank pain, somewhat related to position, responded well to narcotics in ED. CT w/o contrast shows non-specific BILATERAL perinephric stranding and some fluid collection, no stones or hydro. Retroperitoneal US is negative. We have been consulted for GODWIN/CKD.    2/10/18  Creatinine a little higher, still in baseline range.  Remains on IVAB for UTI.  Reports that back pain is relieved.  BP is high this am, has PRN orders for hydralazine. No cp, dyspnea, or edema. UOP 350cc.    Past medical, surgical and social history reviewed  Medications reviewed    Assessment/plan:    CKD stage 4, bl sCr 2-2.5 suspected to be 2/2 DM nephropathy  Unilateral flank pain- possible UTI/pyelonephritis  There is some fluctuation in renal function she remains within baseline range.  Need to get BP under control- see below. D/C NS IVFs.  Need better measurement of UOP.  Continue antbx for UTI/pyelo. Urine cx is pending.  No NSAIDs, ACEI/ARB, IV contrast.  Dose meds for GFR < 30 ml/min.     Anemia of CKD  Stable.     HTN  BP is quite high- control pain.  Nifedipine dose was increased. HR is already in the 60s. If with pain control, BP remains high, will add hydralazine.  D/C NS IVFs.     Review of Systems:  Constitutional: no fever or chills  Respiratory: no cough or shortness of breath  Cardiovascular: no chest pain or palpitations  Gastrointestinal: no nausea or vomiting, no abdominal pain or change in bowel habits  Musculoskeletal: no arthralgias or myalgias  Neurological: no seizures or tremors    OBJECTIVE:     Vital Signs (Most Recent)  Temp: 99 °F (37.2 °C) (02/10/18 0754)  Pulse: 70  (02/10/18 0754)  Resp: 16 (02/10/18 0754)  BP: (!) 195/91 (02/10/18 0754)  SpO2: (!) 92 % (02/10/18 0754)    Vital Signs Range (Last 24H):  Temp:  [96.4 °F (35.8 °C)-99 °F (37.2 °C)]   Pulse:  [56-73]   Resp:  [16-18]   BP: (113-246)/()   SpO2:  [92 %-98 %]     Temp (24hrs), Av.3 °F (36.3 °C), Min:96.4 °F (35.8 °C), Max:99 °F (37.2 °C)    Systolic (24hrs), Av , Min:113 , Max:246     Diastolic (24hrs), Av, Min:54, Max:116      I & O (Last 24H):  Intake/Output Summary (Last 24 hours) at 02/10/18 0833  Last data filed at 02/10/18 0537   Gross per 24 hour   Intake          2571.25 ml   Output              601 ml   Net          1970.25 ml     Physical Exam:  General appearance: well developed, well nourished  Eyes:  conjunctivae/corneas clear. PERRL.  Neck: supple, symmetrical, trachea midline, no JVD  Lungs:  clear to auscultation bilaterally and normal respiratory effort  Heart: regular rate and rhythm, S1, S2 normal, no murmur, click, rub or gallop  Abdomen: soft, non-tender non-distented; bowel sounds normal; no masses,  no organomegaly  Extremities: no cyanosis or edema, or clubbing  Skin: Skin color, texture, turgor normal. No rashes or lesions  Neurologic: Normal strength and tone. No focal numbness or weakness    Laboratory:  CBC:  No results for input(s): WBC, RBC, HGB, HCT, PLT, MCV, MCH, MCHC in the last 24 hours.  BMP:    Recent Labs  Lab 02/10/18  034      K 4.6      CO2 24   BUN 34*   CREATININE 2.4*   CALCIUM 8.5*      CMP:   Recent Labs  Lab 02/10/18  034   *   CALCIUM 8.5*      K 4.6   CO2 24      BUN 34*   CREATININE 2.4*     All other lab data reviewed and negative unless otherwise specified   Diagnostic Results:    Ashleigh Soria MD MPH

## 2018-02-10 NOTE — PLAN OF CARE
Problem: Patient Care Overview  Goal: Plan of Care Review  Outcome: Revised  Blood pressure stable tonight  One episode of nausea and emesis with undigested food; relieved with prn zofran  C/o headache; prn tylenol helps  Continue to monitor and treat HTN  Maintain safety

## 2018-02-10 NOTE — PLAN OF CARE
SW met w/ pt and  for assessment.  Pt current w/ [facility], requests to resume w/ same provider.  Pt signed Pt Choice/Disclosure Form & MOON.  Pt has Case Mgt w/ [facility], states weekly visits from .  Pt independent, does not drive.  Pt denies Advanced Directives.     02/10/18 1048   Discharge Assessment   Assessment Type Discharge Planning Assessment   Confirmed/corrected address and phone number on facesheet? Yes   Assessment information obtained from? Patient   Prior to hospitilization cognitive status: Alert/Oriented   Prior to hospitalization functional status: Independent   Current cognitive status: Alert/Oriented   Current Functional Status: Independent   Facility Arrived From: home w/ Home Health   Lives With spouse   Able to Return to Prior Arrangements yes   Is patient able to care for self after discharge? Yes   Who are your caregiver(s) and their phone number(s)? :  Donovan García  [phone]     mother: Sonia Cota  [phone]   Patient's perception of discharge disposition home health   Readmission Within The Last 30 Days no previous admission in last 30 days   Patient currently being followed by outpatient case management? Yes   If yes, name of outpatient case management following: insurance company assigned oupatient case management  ([employer] case management--weekly homevisits)   Patient currently receives any other outside agency services? No   Equipment Currently Used at Home glucometer   Do you have any problems affording any of your prescribed medications? No   Is the patient taking medications as prescribed? yes   Does the patient have transportation home? Yes   Transportation Available family or friend will provide   Does the patient receive services at the [facility]? No   Discharge Plan A Home Health;Home with family   Discharge Plan B Home Health;Home with family   Patient/Family In Agreement With Plan yes

## 2018-02-10 NOTE — SUBJECTIVE & OBJECTIVE
Interval History: Patient reports overall feeling better. Continue Iv abx. Bp still high. Discussed with Dr. Snyder.  Blood pressure medications titrated and pt started on ACEI. Ultrasound to evaluate for renal artery stenosis ordered.        Review of Systems   Constitutional: Positive for fatigue. Negative for activity change, appetite change, chills and fever.   HENT: Negative for congestion, ear pain, facial swelling, sinus pressure and sore throat.    Eyes: Negative for pain and redness.   Respiratory: Negative for apnea, cough, choking, chest tightness, shortness of breath, wheezing and stridor.    Cardiovascular: Negative for chest pain, palpitations and leg swelling.   Gastrointestinal: Positive for nausea. Negative for abdominal distention, abdominal pain ( ), blood in stool, constipation, diarrhea and vomiting ( ).   Endocrine: Negative for polydipsia and polyphagia.   Genitourinary: Negative for flank pain ( ), hematuria and urgency. Frequency:     Musculoskeletal: Positive for back pain. Negative for gait problem, neck pain and neck stiffness.        Pt reports chronic LBP midline   Skin: Negative for color change.   Allergic/Immunologic: Negative for food allergies.   Neurological: Negative for seizures, facial asymmetry, speech difficulty and weakness.   Hematological: Does not bruise/bleed easily.   Psychiatric/Behavioral: Negative for agitation, behavioral problems, confusion and suicidal ideas. The patient is nervous/anxious.      Objective:     Vital Signs (Most Recent):  Temp: 96.6 °F (35.9 °C) (02/10/18 1551)  Pulse: 66 (02/10/18 1551)  Resp: 18 (02/10/18 1551)  BP: (!) 155/72 (02/10/18 1551)  SpO2: (!) 94 % (02/10/18 1551) Vital Signs (24h Range):  Temp:  [96.4 °F (35.8 °C)-99 °F (37.2 °C)] 96.6 °F (35.9 °C)  Pulse:  [58-73] 66  Resp:  [16-18] 18  SpO2:  [88 %-95 %] 94 %  BP: (113-195)/(54-91) 155/72     Weight: 89 kg (196 lb 3.4 oz)  Body mass index is 28.98 kg/m².    Physical Exam    Constitutional: She is oriented to person, place, and time. She appears well-developed and well-nourished. No distress.   HENT:   Head: Normocephalic and atraumatic.   Eyes: Conjunctivae and EOM are normal. Pupils are equal, round, and reactive to light. Right eye exhibits no discharge. Left eye exhibits no discharge.   Neck: Normal range of motion. Neck supple. No JVD present.   Cardiovascular: Normal rate, regular rhythm, normal heart sounds and intact distal pulses.    No murmur heard.  Pulmonary/Chest: Effort normal and breath sounds normal. No stridor. No respiratory distress.   Abdominal: Soft. Bowel sounds are normal. She exhibits no distension. Tenderness:   There is no guarding.   Genitourinary:   Genitourinary Comments: Not examined   Musculoskeletal: Normal range of motion. She exhibits no edema.   Neurological: She is alert and oriented to person, place, and time. No cranial nerve deficit.   Skin: Skin is warm and dry. She is not diaphoretic.   Psychiatric: She has a normal mood and affect. Her behavior is normal. Judgment and thought content normal.         CRANIAL NERVES     CN III, IV, VI   Pupils are equal, round, and reactive to light.  Extraocular motions are normal.      Labs: Reviewed

## 2018-02-10 NOTE — PLAN OF CARE
02/10/18 1048   ERICKSON Message   Medicare Outpatient and Observation Notification regarding financial responsibility Given to patient/caregiver;Explained to patient/caregiver;Signed/date by patient/caregiver   Date ERICKSON was signed 02/10/18   Time ERICKSON was signed 1046

## 2018-02-11 VITALS
SYSTOLIC BLOOD PRESSURE: 183 MMHG | BODY MASS INDEX: 29.06 KG/M2 | HEART RATE: 67 BPM | HEIGHT: 69 IN | OXYGEN SATURATION: 100 % | DIASTOLIC BLOOD PRESSURE: 92 MMHG | TEMPERATURE: 98 F | RESPIRATION RATE: 18 BRPM | WEIGHT: 196.19 LBS

## 2018-02-11 PROBLEM — R09.02 HYPOXEMIA: Status: RESOLVED | Noted: 2018-02-10 | Resolved: 2018-02-11

## 2018-02-11 PROBLEM — I16.0 HYPERTENSIVE URGENCY: Status: RESOLVED | Noted: 2018-02-09 | Resolved: 2018-02-11

## 2018-02-11 LAB
ANION GAP SERPL CALC-SCNC: 7 MMOL/L
BACTERIA UR CULT: NORMAL
BASOPHILS # BLD AUTO: 0 K/UL
BASOPHILS # BLD AUTO: 0.1 K/UL
BASOPHILS NFR BLD: 0.6 %
BASOPHILS NFR BLD: 0.6 %
BUN SERPL-MCNC: 40 MG/DL
CALCIUM SERPL-MCNC: 8.4 MG/DL
CHLORIDE SERPL-SCNC: 107 MMOL/L
CO2 SERPL-SCNC: 23 MMOL/L
CREAT SERPL-MCNC: 2.7 MG/DL
DIFFERENTIAL METHOD: ABNORMAL
DIFFERENTIAL METHOD: ABNORMAL
EOSINOPHIL # BLD AUTO: 0.3 K/UL
EOSINOPHIL # BLD AUTO: 0.3 K/UL
EOSINOPHIL NFR BLD: 3.1 %
EOSINOPHIL NFR BLD: 3.4 %
ERYTHROCYTE [DISTWIDTH] IN BLOOD BY AUTOMATED COUNT: 14.3 %
ERYTHROCYTE [DISTWIDTH] IN BLOOD BY AUTOMATED COUNT: 14.5 %
EST. GFR  (AFRICAN AMERICAN): 23 ML/MIN/1.73 M^2
EST. GFR  (NON AFRICAN AMERICAN): 20 ML/MIN/1.73 M^2
GLUCOSE SERPL-MCNC: 195 MG/DL
HCT VFR BLD AUTO: 24.4 %
HCT VFR BLD AUTO: 28 %
HGB BLD-MCNC: 8.1 G/DL
HGB BLD-MCNC: 9.2 G/DL
LYMPHOCYTES # BLD AUTO: 2 K/UL
LYMPHOCYTES # BLD AUTO: 2.3 K/UL
LYMPHOCYTES NFR BLD: 23.4 %
LYMPHOCYTES NFR BLD: 27.6 %
MAGNESIUM SERPL-MCNC: 1.8 MG/DL
MCH RBC QN AUTO: 26 PG
MCH RBC QN AUTO: 26.2 PG
MCHC RBC AUTO-ENTMCNC: 32.9 G/DL
MCHC RBC AUTO-ENTMCNC: 33.1 G/DL
MCV RBC AUTO: 79 FL
MCV RBC AUTO: 79 FL
MONOCYTES # BLD AUTO: 0.6 K/UL
MONOCYTES # BLD AUTO: 0.7 K/UL
MONOCYTES NFR BLD: 6.8 %
MONOCYTES NFR BLD: 8.5 %
NEUTROPHILS # BLD AUTO: 5 K/UL
NEUTROPHILS # BLD AUTO: 5.6 K/UL
NEUTROPHILS NFR BLD: 60.2 %
NEUTROPHILS NFR BLD: 65.8 %
PHOSPHATE SERPL-MCNC: 5 MG/DL
PLATELET # BLD AUTO: 262 K/UL
PLATELET # BLD AUTO: 301 K/UL
PMV BLD AUTO: 8.1 FL
PMV BLD AUTO: 9 FL
POCT GLUCOSE: 199 MG/DL (ref 70–110)
POTASSIUM SERPL-SCNC: 4.8 MMOL/L
RBC # BLD AUTO: 3.09 M/UL
RBC # BLD AUTO: 3.55 M/UL
SODIUM SERPL-SCNC: 137 MMOL/L
WBC # BLD AUTO: 8.3 K/UL
WBC # BLD AUTO: 8.5 K/UL

## 2018-02-11 PROCEDURE — 63700000 PHARM REV CODE 250 ALT 637 W/O HCPCS: Performed by: NURSE PRACTITIONER

## 2018-02-11 PROCEDURE — 25000003 PHARM REV CODE 250: Performed by: NURSE PRACTITIONER

## 2018-02-11 PROCEDURE — 80048 BASIC METABOLIC PNL TOTAL CA: CPT

## 2018-02-11 PROCEDURE — 83735 ASSAY OF MAGNESIUM: CPT

## 2018-02-11 PROCEDURE — 96372 THER/PROPH/DIAG INJ SC/IM: CPT

## 2018-02-11 PROCEDURE — 99900035 HC TECH TIME PER 15 MIN (STAT)

## 2018-02-11 PROCEDURE — 94799 UNLISTED PULMONARY SVC/PX: CPT

## 2018-02-11 PROCEDURE — G0378 HOSPITAL OBSERVATION PER HR: HCPCS

## 2018-02-11 PROCEDURE — 36415 COLL VENOUS BLD VENIPUNCTURE: CPT

## 2018-02-11 PROCEDURE — 25000003 PHARM REV CODE 250: Performed by: HOSPITALIST

## 2018-02-11 PROCEDURE — 84100 ASSAY OF PHOSPHORUS: CPT

## 2018-02-11 PROCEDURE — 85025 COMPLETE CBC W/AUTO DIFF WBC: CPT

## 2018-02-11 PROCEDURE — 82962 GLUCOSE BLOOD TEST: CPT

## 2018-02-11 RX ORDER — FUROSEMIDE 20 MG/1
20 TABLET ORAL
Refills: 0
Start: 2018-02-14 | End: 2018-03-23 | Stop reason: SDUPTHER

## 2018-02-11 RX ORDER — FERROUS SULFATE 325(65) MG
325 TABLET, DELAYED RELEASE (ENTERIC COATED) ORAL 2 TIMES DAILY WITH MEALS
Refills: 0 | COMMUNITY
Start: 2018-02-11 | End: 2019-03-14 | Stop reason: SDUPTHER

## 2018-02-11 RX ORDER — LISINOPRIL 10 MG/1
10 TABLET ORAL DAILY
Qty: 30 TABLET | Refills: 0 | Status: SHIPPED | OUTPATIENT
Start: 2018-02-12 | End: 2018-02-12 | Stop reason: SDUPTHER

## 2018-02-11 RX ORDER — AMOXICILLIN AND CLAVULANATE POTASSIUM 500; 125 MG/1; MG/1
1 TABLET, FILM COATED ORAL 2 TIMES DAILY
Qty: 24 TABLET | Refills: 0 | Status: SHIPPED | OUTPATIENT
Start: 2018-02-11 | End: 2018-02-23

## 2018-02-11 RX ORDER — ASCORBIC ACID 500 MG
500 TABLET ORAL NIGHTLY
COMMUNITY
Start: 2018-02-11 | End: 2022-03-10

## 2018-02-11 RX ORDER — NIFEDIPINE 90 MG/1
90 TABLET, EXTENDED RELEASE ORAL DAILY
Qty: 90 TABLET | Refills: 3 | Status: SHIPPED | OUTPATIENT
Start: 2018-02-11 | End: 2018-02-12 | Stop reason: SDUPTHER

## 2018-02-11 RX ORDER — GLIPIZIDE 10 MG/1
5 TABLET ORAL
Start: 2018-02-11 | End: 2018-03-23

## 2018-02-11 RX ORDER — METOPROLOL TARTRATE 50 MG/1
50 TABLET ORAL DAILY
Qty: 180 TABLET | Refills: 1 | Status: SHIPPED | OUTPATIENT
Start: 2018-02-11 | End: 2018-03-23 | Stop reason: SDUPTHER

## 2018-02-11 RX ORDER — METHOCARBAMOL 500 MG/1
500 TABLET, FILM COATED ORAL ONCE
Status: COMPLETED | OUTPATIENT
Start: 2018-02-11 | End: 2018-02-11

## 2018-02-11 RX ADMIN — ASPIRIN 81 MG: 81 TABLET, COATED ORAL at 08:02

## 2018-02-11 RX ADMIN — INSULIN ASPART 2 UNITS: 100 INJECTION, SOLUTION INTRAVENOUS; SUBCUTANEOUS at 06:02

## 2018-02-11 RX ADMIN — ATORVASTATIN CALCIUM 80 MG: 40 TABLET, FILM COATED ORAL at 08:02

## 2018-02-11 RX ADMIN — METHOCARBAMOL 500 MG: 500 TABLET ORAL at 01:02

## 2018-02-11 RX ADMIN — ALPRAZOLAM 0.25 MG: 0.25 TABLET ORAL at 03:02

## 2018-02-11 RX ADMIN — LISINOPRIL 10 MG: 10 TABLET ORAL at 08:02

## 2018-02-11 RX ADMIN — INSULIN ASPART 2 UNITS: 100 INJECTION, SOLUTION INTRAVENOUS; SUBCUTANEOUS at 11:02

## 2018-02-11 RX ADMIN — FENOFIBRATE 160 MG: 160 TABLET ORAL at 08:02

## 2018-02-11 RX ADMIN — ACETAMINOPHEN 1000 MG: 500 TABLET ORAL at 03:02

## 2018-02-11 RX ADMIN — NIFEDIPINE 90 MG: 30 TABLET, FILM COATED, EXTENDED RELEASE ORAL at 08:02

## 2018-02-11 RX ADMIN — METOPROLOL SUCCINATE 50 MG: 50 TABLET, EXTENDED RELEASE ORAL at 08:02

## 2018-02-11 RX ADMIN — Medication 1 TABLET: at 10:02

## 2018-02-11 RX ADMIN — FERROUS SULFATE TAB EC 325 MG (65 MG FE EQUIVALENT) 325 MG: 325 (65 FE) TABLET DELAYED RESPONSE at 08:02

## 2018-02-11 NOTE — DISCHARGE SUMMARY
[facility]  Hospital Medicine  Discharge Summary      Patient Name: Leanna García  MRN: [id number]  Admission Date: 2/9/2018  Hospital Length of Stay: 0 days  Discharge Date and Time:  02/11/2018 1:20 PM  Attending Physician: Arslan Snyder MD   Discharging Provider: CARMELA Mosher  Primary Care Provider: Nolberto Lomax MD    HPI:   Leanna García is a 51 y/o female with uncontrolled DM type 2 with CKD stage 4, HLD, and HTN, past history atrial fibrillation, presented to the ED with sudden onset L flank pain, described as sharp and constant, which awakened her from sleep early this morning.  She did experience nausea and had one episode of vomiting, but denies fever, chest pain, palpitations, SOB, weakness, diarrhea or constipation.  Denies trauma; denies new or unusual rash.  Pain was unrelieved by tylenol.  States that her BP was high, so she took her home meds this morning prior to arrival to the emergency department.  Currently, blood pressure remains elevated; however, she is asymptomatic.  She was given morphine IV in the ED and states pain level is currently 3/10; nausea has subsided. Patient had Ct scan concerning for pyelonephritis and placed in hospital for further evaluation and treatment.    * No surgery found *      Hospital Course:   The patient was monitored closely during her stay. She was given IVF for hydration. A urine culture was sent to the lab. A retroperitoneal ultrasound was done and showed no acute issues. Nephrology was consulted. Patient was continued on Iv rocephin for possible pyelonephritis. She was noted to have hypertensive urgency. Her IVF were discontinued and she received titration of her antihypertensives. Patient discussed with Dr. Snyder and ACEI  was added. A renal artery ultrasound was ordered and no renal artery stenosis was noted. Patient also had some mild hypoxemia on room air. A Cxr was done which showed atelectasis. Patient was given an  I.S. and use was encouraged. Patient's saturations monitored closely and no further hypoxemia noted. Patient's initial symptoms resolved. She was also noted to have some microcytic anemia and continued on ferrous sulfate and MVI. Her overall condition remained stable and improved and she was discharged to home after cleared by Nephrology. Her prior [facility] was continued and blood pressure, blood glucose levels, volume status, and labs to be monitored further as outpatient. Patient also in hospital recently with CHF and was instructed to follow up with cardiology. At time of discharge, patient's urine culture showed no growth and she was discharged to home on Augmentin.     Consults:   Consults         Status Ordering Provider     Inpatient consult to Nephrology  Once     Provider:  Bryce Craig MD    Completed MARTA AGUILLON        Service: Hospital Medicine    Final Active Diagnoses:    Diagnosis Date Noted POA    Hypertension, uncontrolled [I10] 02/10/2018 Yes    Pyelonephritis [N12] 02/10/2018 Yes    Atelectasis [J98.11] 02/10/2018 Yes    Iron deficiency anemia secondary to inadequate dietary iron intake [D50.8] 11/24/2016 Yes    Obesity (BMI 30.0-34.9) [E66.9] 11/17/2016 Yes    MANJINDER (generalized anxiety disorder) [F41.1] 10/25/2016 Yes    CKD (chronic kidney disease) stage 3, GFR 30-59 ml/min [N18.3] 10/14/2016 Yes    Hypertension associated with diabetes [E11.59, I10] 06/13/2013 Yes     Chronic    Hyperlipidemia associated with type 2 diabetes mellitus [E11.69, E78.5] 06/22/2012 Yes    Uncontrolled type 2 diabetes mellitus with stage 3 chronic kidney disease, without long-term current use of insulin [E11.22, E11.65, N18.3] 06/22/2012 Yes      Problems Resolved During this Admission:    Diagnosis Date Noted Date Resolved POA    PRINCIPAL PROBLEM:  Hypertensive urgency [I16.0] 02/09/2018 02/11/2018 Yes    Hypoxemia [R09.02] 02/10/2018 02/11/2018 Yes       Discharged Condition:  stable    [place] [facility] [place] Up:  Follow-up Information     Nolberto Lomax MD In 1 week.    Specialty:  Family Medicine  Why:  Follow up for pyelonephritis, HTN, DM2- Take blood pressure and pulse 2 x day and blood sugars before meals and at bedtime and keep log for follow up  Contact information:  [address]  [place] [address]  [phone]             cardiology In 2 weeks.    Why:  Follow up for recent CHF           Salvador Carlson MD In 3 weeks.    Specialty:  Nephrology  Why:  Follow up for pyelonephritis and CKD  Contact information:  [address] [facility]  [place] [address]  [phone]             [facility].    Specialty:  Home Health Services  Contact information:  [address] [facility] [address]  [phone]                 Patient Instructions:     Diet diabetic   Order Comments: 1800 ADA renal diet     Activity as tolerated     Notify your health care provider if you experience any of the following:  temperature >100.4     Notify your health care provider if you experience any of the following:  persistent nausea and vomiting or diarrhea     Notify your health care provider if you experience any of the following:  severe uncontrolled pain     Notify your health care provider if you experience any of the following:  increased confusion or weakness     Notify your health care provider if you experience any of the following:   Order Comments: Any decline in condition     HOME HEALTH ORDERS   Order Comments: Subsequent Home Health Orders-  Resume Prior [facility]    Current Medications:  Current Facility-Administered Medications:  acetaminophen tablet 1,000 mg, 1,000 mg, Oral, Q6H PRN, Mary Solis NP, 1,000 mg at 02/11/18 0338  albuterol sulfate nebulizer solution 2.5 mg, 2.5 mg, Nebulization, Q4H PRN, CARMELA Song  ALPRAZolam tablet 0.25 mg, 0.25 mg, Oral, QID PRN, Arslan Snyder MD, 0.25 mg at  02/11/18 0338  ascorbic acid (vitamin C) tablet 500 mg, 500 mg, Oral, QHS, CARMELA Aguilar, 500 mg at 02/10/18 2100  aspirin EC tablet 81 mg, 81 mg, Oral, Daily, CARMELA Song, 81 mg at 02/11/18 0830  atorvastatin tablet 80 mg, 80 mg, Oral, Daily, CARMELA Aguilar, 80 mg at 02/11/18 0830  cefTRIAXone injection 1 g, 1 g, Intravenous, Q24H, Arslan Snyder MD, 1 g at 02/10/18 1241  dextrose 50% injection 12.5 g, 12.5 g, Intravenous, PRN, CARMELA Song  dextrose 50% injection 25 g, 25 g, Intravenous, PRN, CARMELA Song  enoxaparin injection 30 mg, 30 mg, Subcutaneous, Daily, CARMELA Aguilar, 30 mg at 02/10/18 1626  fenofibrate tablet 160 mg, 160 mg, Oral, Daily, CARMELA Song, 160 mg at 02/11/18 0830  ferrous sulfate EC tablet 325 mg, 325 mg, Oral, BID WM, CARMELA Aguilar, 325 mg at 02/11/18 0830  folic acid-vit B6-vit B12 2.5-25-2 mg tablet 1 tablet, 1 tablet, Oral, Daily, CARMELA Aguilar, 1 tablet at 02/11/18 1009  glucagon (human recombinant) injection 1 mg, 1 mg, Intramuscular, PRN, CARMELA Song  glucose chewable tablet 16 g, 16 g, Oral, PRN, CARMELA Song  glucose chewable tablet 24 g, 24 g, Oral, PRN, CARMELA Song  insulin aspart pen 1-10 Units, 1-10 Units, Subcutaneous, QID (AC + HS) PRN, CARMELA Song, 2 Units at 02/11/18 1135  labetalol injection 10 mg, 10 mg, Intravenous, Q2H PRN, CARMELA Aguilar  lisinopril tablet 10 mg, 10 mg, Oral, Daily, Arslan Snyder MD, 10 mg at 02/11/18 0830  magnesium oxide tablet 800 mg, 800 mg, Oral, PRN, CARMELA Song  magnesium oxide tablet 800 mg, 800 mg, Oral, PRN, CARMELA Song  metoprolol succinate (TOPROL-XL) 24 hr tablet 50 mg, 50 mg, Oral, Daily, Arslan Snyder MD, 50 mg at 02/11/18 0830  montelukast tablet 10 mg, 10 mg, Oral, QHS, CARMELA Song, 10 mg at 02/10/18 2100  NIFEdipine 24 hr tablet 90 mg,  90 mg, Oral, Daily, CARMELA Aguilar, 90 mg at 02/11/18 0830  ondansetron injection 4 mg, 4 mg, Intravenous, Q8H PRN, Davi Pradhan MD, 4 mg at 02/09/18 2032  potassium chloride 10% solution 40 mEq, 40 mEq, Oral, PRN, Claudia Marsh, FNP  potassium chloride 10% solution 40 mEq, 40 mEq, Oral, PRN, Claudia Marsh, CARMELA  ramelteon tablet 8 mg, 8 mg, Oral, Nightly PRN, Claudia Marsh, SUSANP  sodium chloride 0.9% flush 5 mL, 5 mL, Intravenous, PRN, CARMELA Song        Nursing:   Diabetic Care:   SN to perform and educate Diabetic management with blood glucose monitoring:, Fingerstick blood sugar AC and HS and Report CBG < 60 or > 350 to physician.  Heart Failure:      SN to instruct on the following:   Instruct on the definition of CHF.  Instruct on the signs/sympoms of CHF to be reported.  Instruct on and monitor daily weights.  Instruct on factors that cause exacerbation.  Instruct on action, dose, schedule, and side effects of medications.  Instruct on diet as prescribed.  Instruct on activity allowed.  Instruct on life-style modifications for life long management of CHF  SN to assess compliance with daily weights, diet, medications, fluid retention,   safety precautions, activities permitted and life-style modifications.  Additional 1-2 SN visits per week as needed for signs and symptoms   of CHF exacerbation.      For Weight Gain > 2-3 lbs in 1 day or 4-6 lbs over 1 week notify PCP:    Diet:   diabetic diet 1800 calorie Renal    Activities:   activity as tolerated    Labs:  SN to perform labs:  Weekly CBC, BMP, magnesium level  and Report Lab results to PCP.    Referrals/ Consults  Continue prior orders    Home Health Aide:  Continue prior Orders   Order Specific Question Answer Comments   What Home Health Agency is the patient currently using? Other/External [facility]       Significant Diagnostic Studies:     CT Renal Stone Study ABD Pelvis WO- Findings most likely  representing pyelonephritis with bilateral renal enlargement and perinephric stranding and fluid.    X-Ray Abdomen AP 1 View (KUB)- Mildly abnormal, nonspecific, nonobstructive intestinal gas pattern.    X-Ray Chest PA And Lateral- Bibasal atelectasis in the setting of decreased lung volumes without focal consolidation or gross congestive failure.    US Renal Artery Stenosis Hyperten (xpd)- 1. Peak systolic velocities in renal artery to aortic ratios are normal are normal but resistive indices are elevated.  This is nonspecific but can be seen with hypotension, medical renal disease, obstruction or renal artery stenosis.  Wave forms however do not suggest that there is any critical stenosis in the renal arteries.  Comparison CT demonstrated prominent bilateral kidneys with perinephric fat stranding worrisome for possible infection/pyelonephritis.    US Retroperitoneal Complete- There is increased echogenicity from both kidneys and elevated resistive index from the left kidney suggesting intrinsic renal disease. No hydronephrosis or obstruction is identified.    Labs:   CMP   Recent Labs  Lab 02/10/18  0344 02/11/18  0442    137   K 4.6 4.8    107   CO2 24 23   * 195*   BUN 34* 40*   CREATININE 2.4* 2.7*   CALCIUM 8.5* 8.4*   ANIONGAP 10 7*   ESTGFRAFRICA 26* 23*   EGFRNONAA 23* 20*    and CBC   Recent Labs  Lab 02/11/18  0442 02/11/18  0942   WBC 8.30 8.50   HGB 8.1* 9.2*   HCT 24.4* 28.0*    301       Pending Diagnostic Studies:     Procedure Component Value Units Date/Time    SADIA [[id number]] Collected:  02/09/18 1125    Order Status:  Sent Lab Status:  In process Updated:  02/09/18 1856    Specimen:  Blood from Blood     Anti-DNA antibody, double-stranded [[id number]] Collected:  02/09/18 1125    Order Status:  Sent Lab Status:  In process Updated:  02/09/18 1856    Specimen:  Blood from Blood          Medications:  Reconciled Home Medications:   Current Discharge Medication List       START taking these medications    Details   amoxicillin-clavulanate 500-125mg (AUGMENTIN) 500-125 mg Tab Take 1 tablet (500 mg total) by mouth 2 (two) times daily.  Qty: 24 tablet, Refills: 0      ascorbic acid, vitamin C, (VITAMIN C) 500 MG tablet Take 1 tablet (500 mg total) by mouth every evening.      ferrous sulfate 325 (65 FE) MG EC tablet Take 1 tablet (325 mg total) by mouth 2 (two) times daily with meals.  Refills: 0      folic acid-vit B6-vit B12 2.5-25-2 mg (FOLBIC OR EQUIV) 2.5-25-2 mg Tab Take 1 tablet by mouth once daily.  Qty: 30 tablet, Refills: 0      lisinopril 10 MG tablet Take 1 tablet (10 mg total) by mouth once daily.  Qty: 30 tablet, Refills: 0         CONTINUE these medications which have CHANGED    Details   furosemide (LASIX) 20 MG tablet Take 1 tablet (20 mg total) by mouth every 72 hours.  Refills: 0      glipiZIDE (GLUCOTROL) 10 MG tablet Take 0.5 tablets (5 mg total) by mouth 2 (two) times daily before meals.    Associated Diagnoses: Type 2 diabetes mellitus with complication, with long-term current use of insulin      metoprolol tartrate (LOPRESSOR) 50 MG tablet Take 1 tablet (50 mg total) by mouth once daily.  Qty: 180 tablet, Refills: 1      NIFEdipine (PROCARDIA-XL) 90 MG (OSM) 24 hr tablet Take 1 tablet (90 mg total) by mouth once daily.  Qty: 90 tablet, Refills: 3    Associated Diagnoses: Hypertension associated with diabetes         CONTINUE these medications which have NOT CHANGED    Details   aspirin (ECOTRIN) 81 MG EC tablet Take 1 tablet (81 mg total) by mouth once daily.    Associated Diagnoses: Hypertension associated with diabetes      fenofibrate 160 MG Tab Take 1 tablet (160 mg total) by mouth once daily.  Qty: 90 tablet, Refills: 3    Associated Diagnoses: Hypercholesteremia      montelukast (SINGULAIR) 10 mg tablet Take 1 tablet (10 mg total) by mouth every evening.  Qty: 90 tablet, Refills: 1      acetaminophen (TYLENOL) 325 MG tablet Take 325 mg by mouth every 6  (six) hours as needed for Pain (headache).      albuterol 90 mcg/actuation inhaler Inhale 2 puffs into the lungs 4 (four) times daily.  Qty: 1 Inhaler, Refills: 1      atorvastatin (LIPITOR) 80 MG tablet Take 1 tablet (80 mg total) by mouth once daily.  Qty: 90 tablet, Refills: 3    Associated Diagnoses: Hypercholesteremia      blood sugar diagnostic (ACCU-CHEK KAYLIE PLUS TEST STRP) Strp 1 strip by Misc.(Non-Drug; Combo Route) route 3 (three) times daily.  Qty: 300 strip, Refills: 3         STOP taking these medications       clorazepate (TRANXENE) 3.75 MG Tab Comments:   Reason for Stopping:               Indwelling Lines/Drains at time of discharge:   Lines/Drains/Airways          No matching active lines, drains, or airways        Time spent on the discharge of patient: 48 minutes  Patient was seen and examined on the date of discharge and determined to be suitable for discharge.      Yani Meeks, FNP  Department of Hospital Medicine  [facility]

## 2018-02-11 NOTE — PLAN OF CARE
HUNTER contacted [facility] [phone] ([facility]) re: resumption of HH.  HUNTER faxed face sheet, orders, H&P, Nephrology consult, AVS to [facility] [phone] & sent via [employer].       02/11/18 1202   Discharge Reassessment   Assessment Type Discharge Planning Reassessment

## 2018-02-11 NOTE — PROGRESS NOTES
INPATIENT NEPHROLOGY PROGRESS NOTE  [facility] NEPHROLOGY    Patient Name: Leanna García  Date: 02/11/2018    Reason for consultation: GODWIN/CKD    Chief Complaint:   Chief Complaint   Patient presents with    Flank Pain     c/o left flank pain radiating to abdomen with 1 episode of emesis       History of Present Illness:  52F with h/o uncontrolled DM with neuropathy, DJD with back and feet pains, HTN, CKD 4 with baseline sCr 2-2.5, followed by Dr. Carlson is admitted for evaluation and management of acute onset, unprovoked, severe, unremitting, LEFT back and flank pain, somewhat related to position, responded well to narcotics in ED. CT w/o contrast shows non-specific BILATERAL perinephric stranding and some fluid collection, no stones or hydro. Retroperitoneal US is negative. We have been consulted for GODWIN/CKD.    · Interval History/Subjective:    - BP is high, on RA, UOP 550cc   - no cp, dyspnea; flank pain is better; no worsening edema    · Review of Systems: All 14 systems reviewed and negative, except as noted in HPI    Plan of Care:    Assessment:  GerardoCKI, baseline stage 4 CKD with sCr 2-2.5 suspected to be 2/2 DM nephropathy  Unilateral flank pain- possible UTI/pyelonephritis  HTN  Anemia of CKD    Plan:     1. Acute on Chronic Kidney Injury- Renal function is fluctuating some, likely in part 2/2 labile BP. Repeat renal panel on Wednesday (wrote order and placed in chart; will be drawn by ). She has renal f/u in March.    2. ID- She will be discharged with oral antbx for 2 weeks.     3. Volume/Blood Pressure- BP is high. She is on metoprolol at home dose. Hospitalist increased nifedipine dose and started lisinopril. Hold lasix. Patient will need to keep a BP log BID x 1 week and return to Dr. Carlson. Emphasized a low K diet to patient.     4. Electrolytes/Acid Base- No active issues.     5. Bone Mineral Metabolism- No active issues.     6. Anemia of CKD- Hb is stable. Continue iron tablets. Will set up for  outpatient EPO.     Thank you for allowing us to participate in this patient's care. We will continue to follow.    Medications:  No current facility-administered medications on file prior to encounter.      Current Outpatient Prescriptions on File Prior to Encounter   Medication Sig Dispense Refill    aspirin (ECOTRIN) 81 MG EC tablet Take 1 tablet (81 mg total) by mouth once daily.      fenofibrate 160 MG Tab Take 1 tablet (160 mg total) by mouth once daily. 90 tablet 3    montelukast (SINGULAIR) 10 mg tablet Take 1 tablet (10 mg total) by mouth every evening. 90 tablet 1    [DISCONTINUED] furosemide (LASIX) 20 MG tablet TAKE 1 TABLET EVERY DAY 90 tablet 1    [DISCONTINUED] glipiZIDE (GLUCOTROL) 10 MG tablet Take 1 tablet (10 mg total) by mouth 2 (two) times daily before meals. 180 tablet 3    [DISCONTINUED] metoprolol tartrate (LOPRESSOR) 50 MG tablet Take 1 tablet (50 mg total) by mouth 2 (two) times daily. 180 tablet 1    [DISCONTINUED] NIFEdipine (PROCARDIA-XL) 60 MG (OSM) 24 hr tablet Take 1 tablet (60 mg total) by mouth once daily. 90 tablet 3    acetaminophen (TYLENOL) 325 MG tablet Take 325 mg by mouth every 6 (six) hours as needed for Pain (headache).      albuterol 90 mcg/actuation inhaler Inhale 2 puffs into the lungs 4 (four) times daily. (Patient taking differently: Inhale 2 puffs into the lungs every 4 (four) hours as needed. ) 1 Inhaler 1    atorvastatin (LIPITOR) 80 MG tablet Take 1 tablet (80 mg total) by mouth once daily. 90 tablet 3    blood sugar diagnostic (ACCU-CHEK KAYLIE PLUS TEST STRP) Strp 1 strip by Misc.(Non-Drug; Combo Route) route 3 (three) times daily. 300 strip 3    [DISCONTINUED] clorazepate (TRANXENE) 3.75 MG Tab Take 7.5 mg by mouth nightly as needed.        Scheduled Meds:   ascorbic acid (vitamin C)  500 mg Oral QHS    aspirin  81 mg Oral Daily    atorvastatin  80 mg Oral Daily    cefTRIAXone (ROCEPHIN) IVPB  1 g Intravenous Q24H    enoxaparin  30 mg  Subcutaneous Daily    fenofibrate  160 mg Oral Daily    ferrous sulfate  325 mg Oral BID WM    folic acid-vit B6-vit B12 2.5-25-2 mg  1 tablet Oral Daily    lisinopril  10 mg Oral Daily    metoprolol succinate  50 mg Oral Daily    montelukast  10 mg Oral QHS    NIFEdipine  90 mg Oral Daily     Continuous Infusions:  PRN Meds:.acetaminophen, albuterol sulfate, ALPRAZolam, dextrose 50%, dextrose 50%, glucagon (human recombinant), glucose, glucose, insulin aspart, labetalol, magnesium oxide, magnesium oxide, ondansetron, potassium chloride 10%, potassium chloride 10%, ramelteon, sodium chloride 0.9%    Allergies:  Dilaudid [hydromorphone (bulk)] and Lortab [hydrocodone-acetaminophen]    Vital Signs:  Temp Readings from Last 3 Encounters:   02/11/18 97.9 °F (36.6 °C) (Oral)   01/18/18 98.2 °F (36.8 °C) (Oral)   01/09/18 98.8 °F (37.1 °C) (Oral)       Pulse Readings from Last 3 Encounters:   02/11/18 67   01/18/18 72   01/09/18 62       BP Readings from Last 3 Encounters:   02/11/18 (!) 183/92   01/18/18 (!) 158/82   01/09/18 (!) 164/77       Weight:  Wt Readings from Last 3 Encounters:   02/09/18 89 kg (196 lb 3.4 oz)   01/18/18 91.8 kg (202 lb 6.1 oz)   01/09/18 82.1 kg (181 lb)       Physical Exam:  Constitutional: nad, aao x 3  Heart: rrr, no m/r/g, wwp, trace edema  Lungs: ctab, no w/r/r/c, no lb  Abdomen: s/nt/nd, +BS    Results:  Lab Results   Component Value Date     02/11/2018    K 4.8 02/11/2018     02/11/2018    CO2 23 02/11/2018    BUN 40 (H) 02/11/2018    CREATININE 2.7 (H) 02/11/2018    CALCIUM 8.4 (L) 02/11/2018    ANIONGAP 7 (L) 02/11/2018    ESTGFRAFRICA 23 (A) 02/11/2018    EGFRNONAA 20 (A) 02/11/2018       Lab Results   Component Value Date    CALCIUM 8.4 (L) 02/11/2018    PHOS 5.0 (H) 02/11/2018         Recent Labs  Lab 02/11/18  0942   WBC 8.50   RBC 3.55*   HGB 9.2*   HCT 28.0*      MCV 79*   MCH 26.0*   MCHC 32.9       I have personally reviewed pertinent radiological  imaging and reports.    Ashleigh Soria MD MPH  [facility] [address] [address]  [phone] (p)  [phone] (f)

## 2018-02-12 ENCOUNTER — TELEPHONE (OUTPATIENT)
Dept: MEDSURG UNIT | Facility: HOSPITAL | Age: 53
End: 2018-02-12

## 2018-02-12 DIAGNOSIS — I15.2 HYPERTENSION ASSOCIATED WITH DIABETES: Chronic | ICD-10-CM

## 2018-02-12 DIAGNOSIS — E11.59 HYPERTENSION ASSOCIATED WITH DIABETES: Chronic | ICD-10-CM

## 2018-02-12 LAB
ANA SER QL IF: NORMAL
DSDNA AB SER-ACNC: NORMAL [IU]/ML

## 2018-02-12 NOTE — TELEPHONE ENCOUNTER
----- Message from Sonia Rodgers sent at 2/12/2018 12:46 PM CST -----  Patient needs a refill of medications: NIFEdipine (PROCARDIA-XL) 90 MG (OSM) 24 hr tablet, Folic acid 2.5 mg and Lisinopril 10 mg (90 day supply)called into [facility] pharmacy. Please order or call patient back at [phone] if you have any questions. Thanks!

## 2018-02-15 ENCOUNTER — TELEPHONE (OUTPATIENT)
Dept: FAMILY MEDICINE | Facility: CLINIC | Age: 53
End: 2018-02-15

## 2018-02-15 NOTE — TELEPHONE ENCOUNTER
HH nurse wanted to verify if she had to do weekly lab draws that was ordered when discharged from the hospital. She will draw next week and I will gather more information on if this is going to be continued.

## 2018-02-15 NOTE — TELEPHONE ENCOUNTER
----- Message from Margarita Overton sent at 2/12/2018 12:32 PM CST -----  Contact: Vicky - [facility]  Vicky with [facility] has some questions about some labs that were done at the the hospital and can you please call her back at [phone].  Thank you

## 2018-02-16 ENCOUNTER — TELEPHONE (OUTPATIENT)
Dept: FAMILY MEDICINE | Facility: CLINIC | Age: 53
End: 2018-02-16

## 2018-02-19 RX ORDER — LISINOPRIL 10 MG/1
10 TABLET ORAL DAILY
Qty: 90 TABLET | Refills: 0 | Status: SHIPPED | OUTPATIENT
Start: 2018-02-19 | End: 2018-02-28 | Stop reason: SDUPTHER

## 2018-02-19 RX ORDER — NIFEDIPINE 90 MG/1
90 TABLET, EXTENDED RELEASE ORAL DAILY
Qty: 90 TABLET | Refills: 1 | Status: SHIPPED | OUTPATIENT
Start: 2018-02-19 | End: 2018-03-23 | Stop reason: SDUPTHER

## 2018-02-20 ENCOUNTER — TELEPHONE (OUTPATIENT)
Dept: FAMILY MEDICINE | Facility: CLINIC | Age: 53
End: 2018-02-20

## 2018-02-20 ENCOUNTER — OUTPATIENT CASE MANAGEMENT (OUTPATIENT)
Dept: ADMINISTRATIVE | Facility: OTHER | Age: 53
End: 2018-02-20

## 2018-02-20 NOTE — TELEPHONE ENCOUNTER
----- Message from So Donal sent at 2/20/2018  3:37 PM CST -----  Contact: patient  Type:  RX Refill Request    Who Called:  Patient  Refill or New Rx:  Refill   RX Name and Strength: NIFEdipine (PROCARDIA-XL) 90 MG, folic acid-vit B6-vit B12 2.5-25-2 mg and lisinopril 10 MG tablet  The patient is calling to follow up on the refill requests. Please advise.  [facility] - [place], [place] - [address]  [address] [address]  Phone: [phone] Fax: [phone]  Call back   Thanks!

## 2018-02-20 NOTE — TELEPHONE ENCOUNTER
----- Message from So Donal sent at 2/20/2018  3:37 PM CST -----  Contact: patient  Type:  RX Refill Request    Who Called:  Patient  Refill or New Rx:  Refill   RX Name and Strength: NIFEdipine (PROCARDIA-XL) 90 MG, folic acid-vit B6-vit B12 2.5-25-2 mg and lisinopril 10 MG tablet  The patient is calling to follow up on the refill requests. Please advise.  [facility] - [place], [place] - [address]  [address] [address]  Phone: [phone] Fax: [phone]  Call back   Thanks!

## 2018-02-20 NOTE — PROGRESS NOTES
2/20/18: LCSW contacted [facility] to determine if pt had transportation benefits covered under her policy; she does not.  LCSW contacted pt for follow up.  Pt briefly talked about her ED visit a couple of weeks ago; she is feeling better now.  LCSW informed pt she does not have transportation benefits covered under her [facility] policy.  LCSW told pt about applying for the Community Services Block Jonathon (CSBG) through [facility] for transportation to medical appointments.  Pt does not believe she would be eligible for this jonathon as she would be over the income limit.  LCSW will follow up with pt in two weeks.      PLAN:     Continue to locate transportation benefits in [facility].

## 2018-02-22 ENCOUNTER — TELEPHONE (OUTPATIENT)
Dept: FAMILY MEDICINE | Facility: CLINIC | Age: 53
End: 2018-02-22

## 2018-02-22 NOTE — TELEPHONE ENCOUNTER
----- Message from Des Benjamin sent at 2/22/2018  8:26 AM CST -----  Contact: [facility]- [phone]  Patient has not started rx Augmentin and iron prescribed on 02/11/2018 by the hospital. Labs drawn yesterday, the results be available today.  Thanks!

## 2018-02-23 NOTE — TELEPHONE ENCOUNTER
----- Message from Barbara Barrientos sent at 2/22/2018 10:49 AM CST -----  Contact: patient   Patient calling to speak to the Nurse about prescriptions sent for NIFEdipine (PROCARDIA-XL) 90 MG (OSM) 24 hr tablet, folic acid-vit B6-vit B12 2.5-25-2 mg (FOLBIC OR EQUIV) 2.5-25-2 mg Tab, and lisinopril 10 MG tablet. Please advise.   Call back    Thanks!     [facility] - [place], [place] - [address]  [address] [address]  Phone: [phone] Fax: [phone]

## 2018-02-23 NOTE — TELEPHONE ENCOUNTER
Advised patient medications was sent over as a 90 day supply. She stated her balance was 100 dollars, but she will call her pharmacy back to find out why she has a balance

## 2018-02-23 NOTE — TELEPHONE ENCOUNTER
----- Message from Barbara Barrientos sent at 2/22/2018 10:49 AM CST -----  Contact: patient   Patient calling to speak to the Nurse about prescriptions sent for NIFEdipine (PROCARDIA-XL) 90 MG (OSM) 24 hr tablet, folic acid-vit B6-vit B12 2.5-25-2 mg (FOLBIC OR EQUIV) 2.5-25-2 mg Tab, and lisinopril 10 MG tablet. Please advise.   Call back    Thanks!     [facility] - [place], [place] - [address]  [address] [address]  Phone: [phone] Fax: [phone]

## 2018-02-27 ENCOUNTER — OUTPATIENT CASE MANAGEMENT (OUTPATIENT)
Dept: ADMINISTRATIVE | Facility: OTHER | Age: 53
End: 2018-02-27

## 2018-02-27 NOTE — PROGRESS NOTES
2/27/2018  1st Attempt to complete follow-up for Outpatient Care Management; left message requesting return call.  ABI Grimm

## 2018-02-28 NOTE — TELEPHONE ENCOUNTER
Please advise.. Patient requesting medications be sent to [facility] with a 30 day supply because they will be cheaper. She did not get the medications from her mail order pharmacy

## 2018-02-28 NOTE — TELEPHONE ENCOUNTER
----- Message from Sofie Taylor sent at 2/28/2018 10:36 AM CST -----    Calling  To  Get refill on folbictad acid // [phone]  [facility] [address]  [place] [address] [facility] [address] [address]  [address] [place] [address]  Phone: [phone] Fax: [phone]

## 2018-03-02 RX ORDER — LISINOPRIL 10 MG/1
10 TABLET ORAL DAILY
Qty: 30 TABLET | Refills: 2 | Status: SHIPPED | OUTPATIENT
Start: 2018-03-02 | End: 2018-03-23 | Stop reason: SDUPTHER

## 2018-03-07 ENCOUNTER — OUTPATIENT CASE MANAGEMENT (OUTPATIENT)
Dept: ADMINISTRATIVE | Facility: OTHER | Age: 53
End: 2018-03-07

## 2018-03-07 NOTE — PROGRESS NOTES
3/7/18: 1st Attempt to complete SW follow-up for Outpatient Care Management; left message requesting return call.

## 2018-03-08 ENCOUNTER — OUTPATIENT CASE MANAGEMENT (OUTPATIENT)
Dept: ADMINISTRATIVE | Facility: OTHER | Age: 53
End: 2018-03-08

## 2018-03-08 NOTE — TELEPHONE ENCOUNTER
Spoke with De and notified her that pt will need to schedule an appt to get a prescription for a muscle relaxer.  Voiced understanding and states they will call back to schedule an appt.

## 2018-03-08 NOTE — TELEPHONE ENCOUNTER
----- Message from Madhavi Silverman RT sent at 3/8/2018  9:36 AM CST -----  Contact: De Chanel, Care Manager, [phone] Joni Chanel, Care Manager, [phone] [facility], requesting to inform the pt is complaining of sciatic nerve pain radiating down Rt leg and would like the muscle relaxer refilled, thanks.

## 2018-03-08 NOTE — PROGRESS NOTES
3/8/2018  Follow up completed with patient this am.  Pt. Reports doing well.  No new complaints.  Pt. Reports blood glucose this am of 109, reviewed diet, encouraged DM diet, encouraged low salt diet.  Pt. Reports [place]  caretaker visit completed this am, per patient she comes weekly for 30-60 minutes to assist her with needs.  Pt. Reports taking medications this am, encouraged medication compliance.  Reviewed with patient [facility] on call number [phone], patient encouraged to call with questions nurse available 24/7.  Patient is agreeable to receive [facility] on call magnet to put on refrigerator.  Pt. In monitoring status, OPCM SW on case.      Follow up care:    Collaboration with OPCM SW re needs  Review complications education  Encourage low salt, DM diet    ABI Grimm

## 2018-03-09 ENCOUNTER — TELEPHONE (OUTPATIENT)
Dept: FAMILY MEDICINE | Facility: CLINIC | Age: 53
End: 2018-03-09

## 2018-03-09 NOTE — TELEPHONE ENCOUNTER
Notified pt that she needs to contact her insurance company to see what medication is on their formulary in place of Procardia.  Once she finds out she can contact office and let us know what medication needs to be ordered.  Voiced understanding,

## 2018-03-09 NOTE — TELEPHONE ENCOUNTER
----- Message from Bony Hernandez sent at 3/9/2018  2:14 PM CST -----  Contact: same  Patient called in and stated [employer] changed their formulary and will not cover the following Rx.  Patient would like this resent to [facility]. Patient wanted to see if she can get a 30 day supply with 2 refills. (just like the folic acid was done)    1.  NIFEdipine (PROCARDIA-XL) 90 MG (OSM) 24 hr tablet      [facility] [address] [place] - [address] [facility] [facility]  [address] [facility] [address]  Phone: [phone] Fax: [phone]    Patient call back number is [phone]

## 2018-03-20 DIAGNOSIS — E11.8 TYPE 2 DIABETES MELLITUS WITH COMPLICATION, WITHOUT LONG-TERM CURRENT USE OF INSULIN: Primary | ICD-10-CM

## 2018-03-20 DIAGNOSIS — Z12.11 COLON CANCER SCREENING: ICD-10-CM

## 2018-03-21 ENCOUNTER — OUTPATIENT CASE MANAGEMENT (OUTPATIENT)
Dept: ADMINISTRATIVE | Facility: OTHER | Age: 53
End: 2018-03-21

## 2018-03-21 NOTE — PROGRESS NOTES
3/21/18: LCSW and RN conference called pt for follow up.  LCSW gave pt the number to apply for non-emergency medical transportation through a community services block ulices.  LCSW also encouraged pt to add transportation benefits to her [employer] policy at next open enrollment period.  Pt did not report any further social needs or concerns at this time.  LCSW will close case.

## 2018-03-21 NOTE — PROGRESS NOTES
3/21/2018  Follow up conference call with [facility] Omkar HARRISON and patient.  Patient reports doing well, no new complaints/concerns.  [facility] HUNTER provided transportation resources to patient.  Reviewed with patient medication refills and cost.  Patient reports no longer receiving procardia or folic acid from [facility] mail order.  Patient states she can get the prescriptions filled at [facility] for less money.  Encouraged patient to shop around with various pharmacies re cost of prescriptions, not all pharmacy's charge the same amount.  Patient verbalized understanding.  Encouraged medication compliance.  Encouraged appointment compliance.  Informed patient we will be closing case at this time, encouraged her to call us with any needs.  Patient verbalized understanding.  Will close case at this time.  ABI Grimm

## 2018-03-23 ENCOUNTER — OFFICE VISIT (OUTPATIENT)
Dept: FAMILY MEDICINE | Facility: CLINIC | Age: 53
End: 2018-03-23
Payer: MEDICARE

## 2018-03-23 VITALS
OXYGEN SATURATION: 97 % | HEART RATE: 59 BPM | DIASTOLIC BLOOD PRESSURE: 78 MMHG | SYSTOLIC BLOOD PRESSURE: 168 MMHG | HEIGHT: 69 IN | WEIGHT: 207.69 LBS | BODY MASS INDEX: 30.76 KG/M2

## 2018-03-23 DIAGNOSIS — E78.00 HYPERCHOLESTEREMIA: ICD-10-CM

## 2018-03-23 DIAGNOSIS — E66.9 OBESITY (BMI 30.0-34.9): ICD-10-CM

## 2018-03-23 DIAGNOSIS — E11.59 HYPERTENSION ASSOCIATED WITH DIABETES: Chronic | ICD-10-CM

## 2018-03-23 DIAGNOSIS — Z59.82 LACK OF ACCESS TO TRANSPORTATION: ICD-10-CM

## 2018-03-23 DIAGNOSIS — E78.5 HYPERLIPIDEMIA ASSOCIATED WITH TYPE 2 DIABETES MELLITUS: Primary | ICD-10-CM

## 2018-03-23 DIAGNOSIS — I15.2 HYPERTENSION ASSOCIATED WITH DIABETES: Chronic | ICD-10-CM

## 2018-03-23 DIAGNOSIS — E11.69 HYPERLIPIDEMIA ASSOCIATED WITH TYPE 2 DIABETES MELLITUS: Primary | ICD-10-CM

## 2018-03-23 DIAGNOSIS — E11.40 TYPE 2 DIABETES MELLITUS WITH DIABETIC NEUROPATHY, UNSPECIFIED LONG TERM INSULIN USE STATUS: ICD-10-CM

## 2018-03-23 DIAGNOSIS — I11.9 HYPERTENSIVE LEFT VENTRICULAR HYPERTROPHY, WITHOUT HEART FAILURE: ICD-10-CM

## 2018-03-23 PROBLEM — I10 HYPERTENSION, UNCONTROLLED: Status: RESOLVED | Noted: 2018-02-10 | Resolved: 2018-03-23

## 2018-03-23 PROCEDURE — 3077F SYST BP >= 140 MM HG: CPT | Mod: CPTII,S$GLB,, | Performed by: FAMILY MEDICINE

## 2018-03-23 PROCEDURE — 99214 OFFICE O/P EST MOD 30 MIN: CPT | Mod: S$GLB,,, | Performed by: FAMILY MEDICINE

## 2018-03-23 PROCEDURE — 3045F PR MOST RECENT HEMOGLOBIN A1C LEVEL 7.0-9.0%: CPT | Mod: CPTII,S$GLB,, | Performed by: FAMILY MEDICINE

## 2018-03-23 PROCEDURE — 3078F DIAST BP <80 MM HG: CPT | Mod: CPTII,S$GLB,, | Performed by: FAMILY MEDICINE

## 2018-03-23 PROCEDURE — 99999 PR PBB SHADOW E&M-EST. PATIENT-LVL III: CPT | Mod: PBBFAC,,, | Performed by: FAMILY MEDICINE

## 2018-03-23 RX ORDER — FENOFIBRATE 160 MG/1
160 TABLET ORAL DAILY
Qty: 90 TABLET | Refills: 3 | Status: SHIPPED | OUTPATIENT
Start: 2018-03-23 | End: 2018-10-23 | Stop reason: SDUPTHER

## 2018-03-23 RX ORDER — ATORVASTATIN CALCIUM 80 MG/1
80 TABLET, FILM COATED ORAL DAILY
Qty: 90 TABLET | Refills: 3 | Status: SHIPPED | OUTPATIENT
Start: 2018-03-23 | End: 2018-10-23 | Stop reason: SDUPTHER

## 2018-03-23 RX ORDER — GLIPIZIDE 5 MG/1
5 TABLET, FILM COATED, EXTENDED RELEASE ORAL
Qty: 180 TABLET | Refills: 3 | Status: SHIPPED | OUTPATIENT
Start: 2018-03-23 | End: 2018-10-23 | Stop reason: ALTCHOICE

## 2018-03-23 RX ORDER — MONTELUKAST SODIUM 10 MG/1
10 TABLET ORAL NIGHTLY
Qty: 90 TABLET | Refills: 1 | Status: SHIPPED | OUTPATIENT
Start: 2018-03-23 | End: 2019-03-14 | Stop reason: SDUPTHER

## 2018-03-23 RX ORDER — NIFEDIPINE 90 MG/1
90 TABLET, EXTENDED RELEASE ORAL DAILY
Qty: 90 TABLET | Refills: 1 | Status: SHIPPED | OUTPATIENT
Start: 2018-03-23 | End: 2018-05-30

## 2018-03-23 RX ORDER — GLIPIZIDE 5 MG/1
5 TABLET, FILM COATED, EXTENDED RELEASE ORAL
COMMUNITY
End: 2018-03-23 | Stop reason: SDUPTHER

## 2018-03-23 RX ORDER — METOPROLOL TARTRATE 50 MG/1
50 TABLET ORAL DAILY
Qty: 180 TABLET | Refills: 1 | Status: ON HOLD | OUTPATIENT
Start: 2018-03-23 | End: 2018-04-28

## 2018-03-23 RX ORDER — GABAPENTIN 300 MG/1
300 CAPSULE ORAL NIGHTLY
Qty: 90 CAPSULE | Refills: 3 | Status: SHIPPED | OUTPATIENT
Start: 2018-03-23 | End: 2018-10-23 | Stop reason: SDUPTHER

## 2018-03-23 RX ORDER — LISINOPRIL 10 MG/1
10 TABLET ORAL DAILY
Qty: 30 TABLET | Refills: 2 | Status: SHIPPED | OUTPATIENT
Start: 2018-03-23 | End: 2018-10-23

## 2018-03-23 RX ORDER — FUROSEMIDE 20 MG/1
20 TABLET ORAL
Refills: 0
Start: 2018-03-23 | End: 2018-04-22

## 2018-03-23 SDOH — SOCIAL DETERMINANTS OF HEALTH (SDOH): TRANSPORTATION INSECURITY: Z59.82

## 2018-03-23 NOTE — PROGRESS NOTES
Subjective:       Patient ID: Leanna García is a 52 y.o. female.     Chief Complaint: [facility]     52 female presents for high blood pressure. Per the patient's logs her blood pressure has recently had several spikes at 160/80s. In clinic today her blood pressure was 168/78. She takes her medications regularly as she has a home nurse come. She denies any symptoms of HTN, no headaches, chest pain or pounding in her ears.     She also complains of leg cramps at night. They occur most nights of the week.      She also has an appt with a cardiologist Dr. Cristofer Mason and requires a referral to him.      Review of Systems   Constitutional: Negative for chills and fever.   HENT: Negative for rhinorrhea and sore throat.    Respiratory: Negative for cough and shortness of breath.    Cardiovascular: Negative for chest pain and palpitations.   Gastrointestinal: Negative for abdominal pain, constipation, diarrhea and nausea.   Genitourinary: Negative for dysuria and flank pain.   Musculoskeletal: Positive for arthralgias.   Neurological: Negative for light-headedness, numbness and headaches.       Objective:   Physical Exam   Constitutional: She is oriented to person, place, and time. She appears well-developed and well-nourished.   HENT:   Head: Normocephalic and atraumatic.   Eyes: EOM are normal. Pupils are equal, round, and reactive to light.   Neck: Normal range of motion. Neck supple.   Cardiovascular: Normal rate, regular rhythm and normal heart sounds.    Pulmonary/Chest: Effort normal and breath sounds normal.   Abdominal: Soft. Bowel sounds are normal. There is no tenderness.   Musculoskeletal: Normal range of motion.   Neurological: She is alert and oriented to person, place, and time.   Skin: Skin is warm and dry. Capillary refill takes less than 2 seconds.       Assessment:       1. Hyperlipidemia associated with type 2 diabetes mellitus      2. Hypercholesteremia  atorvastatin (LIPITOR) 80 MG tablet      fenofibrate 160 MG Tab   3. Hypertension associated with diabetes  lisinopril 10 MG tablet     metoprolol tartrate (LOPRESSOR) 50 MG tablet     NIFEdipine (PROCARDIA-XL) 90 MG (OSM) 24 hr tablet     Ambulatory Referral to Cardiology   4. Hypertensive left ventricular hypertrophy, without heart failure  furosemide (LASIX) 20 MG tablet     metoprolol tartrate (LOPRESSOR) 50 MG tablet     Ambulatory Referral to Cardiology     SUBSEQUENT HOME HEALTH ORDERS   5. Obesity (BMI 30.0-34.9)      6. Uncontrolled type 2 diabetes mellitus with stage 3 chronic kidney disease, without long-term current use of insulin  glipiZIDE (GLUCOTROL) 5 MG TR24     SUBSEQUENT HOME HEALTH ORDERS   7. Lack of access to transportation  SUBSEQUENT HOME HEALTH ORDERS   8. Type 2 diabetes mellitus with diabetic neuropathy, unspecified long term insulin use status  gabapentin (NEURONTIN) 300 MG capsule         Plan:       1. Hypercholesteremia  Patient doing well. Well controlled HLD. No change to medication  - atorvastatin (LIPITOR) 80 MG tablet; Take 1 tablet (80 mg total) by mouth once daily.  Dispense: 90 tablet; Refill: 3  - fenofibrate 160 MG Tab; Take 1 tablet (160 mg total) by mouth once daily.  Dispense: 90 tablet; Refill: 3     2. Hypertension associated with diabetes  Followed by renal. Refer to renal for HTN management  - lisinopril 10 MG tablet; Take 1 tablet (10 mg total) by mouth once daily.  Dispense: 30 tablet; Refill: 2  - metoprolol tartrate (LOPRESSOR) 50 MG tablet; Take 1 tablet (50 mg total) by mouth once daily.  Dispense: 180 tablet; Refill: 1  - NIFEdipine (PROCARDIA-XL) 90 MG (OSM) 24 hr tablet; Take 1 tablet (90 mg total) by mouth once daily.  Dispense: 90 tablet; Refill: 1  - Ambulatory Referral to Cardiology     3. Hyperlipidemia associated with type 2 diabetes mellitus  See above     4. Hypertensive left ventricular hypertrophy, without heart failure  Referred to cardiology for followup  - furosemide (LASIX) 20 MG  tablet; Take 1 tablet (20 mg total) by mouth every 72 hours.; Refill: 0  - metoprolol tartrate (LOPRESSOR) 50 MG tablet; Take 1 tablet (50 mg total) by mouth once daily.  Dispense: 180 tablet; Refill: 1  - Ambulatory Referral to Cardiology  - SUBSEQUENT HOME HEALTH ORDERS     5. Obesity (BMI 30.0-34.9)  Did not address at this time     6. Uncontrolled type 2 diabetes mellitus with stage 3 chronic kidney disease, without long-term current use of insulin  Recent blood sugars, changed medications and will assess HbA1c in two months  - glipiZIDE (GLUCOTROL) 5 MG TR24; Take 1 tablet (5 mg total) by mouth 2 times daily 2 hours after meal.  Dispense: 180 tablet; Refill: 3  - SUBSEQUENT HOME HEALTH ORDERS     7. Lack of access to transportation  - SUBSEQUENT HOME HEALTH ORDERS     8. Type 2 diabetes mellitus with diabetic neuropathy, unspecified long term insulin use status  Leg cramps likely secondary to her electrolyte imbalance. Trial gabapentin.  - gabapentin (NEURONTIN) 300 MG capsule; Take 1 capsule (300 mg total) by mouth every evening.  Dispense: 90 capsule; Refill: 3       Patient readiness: acceptance and barriers:none    During the course of the visit the patient was educated and counseled about the following:     Diabetes:  Educational material distributed.  Addressed ADA diet.  Suggested low cholesterol diet.  Encouraged aerobic exercise.  Discussed foot care.  Reminded to get yearly retinal exam.  Hypertension:   Dietary sodium restriction.  Regular aerobic exercise.  Obesity:   Informal exercise measures discussed, e.g. taking stairs instead of elevator.  Regular aerobic exercise program discussed.    Goals: Diabetes: Maintain Hemoglobin A1C below 7, Hypertension: Reduce Blood Pressure and Obesity: Reduce calorie intake and BMI    Did patient meet goals/outcomes: No    The following self management tools provided: blood pressure log  blood glucose log  excercise log    Patient Instructions (the written plan)  was given to the patient/family.     Time spent with patient: 30 minutes    Barriers to medications present (no )    Adverse reactions to current medications (no)    Over the counter medications reviewed (Yes)    Portions of this note were created using Dragon voice recognition software. There may be voice recognition errors found in the text, and attempts were made to correct these errors prior to signature    Luis Hassan MD    Family Medicine  3/23/2018

## 2018-03-23 NOTE — MEDICAL/APP STUDENT
Subjective:       Patient ID: Leanna García is a 52 y.o. female.    Chief Complaint: [facility]    52 female presents for high blood pressure. Per the patient's logs her blood pressure has recently had several spikes at 160/80s. In clinic today her blood pressure was 168/78. She takes her medications regularly as she has a home nurse come. She denies any symptoms of HTN, no headaches, chest pain or pounding in her ears.    She also complains of leg cramps at night. They occur most nights of the week.     She also has an appt with a cardiologist Dr. Cristofer Mason and requires a referral to him.       Review of Systems   Constitutional: Negative for chills and fever.   HENT: Negative for rhinorrhea and sore throat.    Respiratory: Negative for cough and shortness of breath.    Cardiovascular: Negative for chest pain and palpitations.   Gastrointestinal: Negative for abdominal pain, constipation, diarrhea and nausea.   Genitourinary: Negative for dysuria and flank pain.   Musculoskeletal: Positive for arthralgias.   Neurological: Negative for light-headedness, numbness and headaches.       Objective:      Physical Exam   Constitutional: She is oriented to person, place, and time. She appears well-developed and well-nourished.   HENT:   Head: Normocephalic and atraumatic.   Eyes: EOM are normal. Pupils are equal, round, and reactive to light.   Neck: Normal range of motion. Neck supple.   Cardiovascular: Normal rate, regular rhythm and normal heart sounds.    Pulmonary/Chest: Effort normal and breath sounds normal.   Abdominal: Soft. Bowel sounds are normal. There is no tenderness.   Musculoskeletal: Normal range of motion.   Neurological: She is alert and oriented to person, place, and time.   Skin: Skin is warm and dry. Capillary refill takes less than 2 seconds.       Assessment:       1. Hyperlipidemia associated with type 2 diabetes mellitus     2. Hypercholesteremia  atorvastatin (LIPITOR) 80 MG tablet     fenofibrate 160 MG Tab   3. Hypertension associated with diabetes  lisinopril 10 MG tablet    metoprolol tartrate (LOPRESSOR) 50 MG tablet    NIFEdipine (PROCARDIA-XL) 90 MG (OSM) 24 hr tablet    Ambulatory Referral to Cardiology   4. Hypertensive left ventricular hypertrophy, without heart failure  furosemide (LASIX) 20 MG tablet    metoprolol tartrate (LOPRESSOR) 50 MG tablet    Ambulatory Referral to Cardiology    SUBSEQUENT HOME HEALTH ORDERS   5. Obesity (BMI 30.0-34.9)     6. Uncontrolled type 2 diabetes mellitus with stage 3 chronic kidney disease, without long-term current use of insulin  glipiZIDE (GLUCOTROL) 5 MG TR24    SUBSEQUENT HOME HEALTH ORDERS   7. Lack of access to transportation  SUBSEQUENT HOME HEALTH ORDERS   8. Type 2 diabetes mellitus with diabetic neuropathy, unspecified long term insulin use status  gabapentin (NEURONTIN) 300 MG capsule       Plan:       1. Hypercholesteremia  Patient doing well. Well controlled HLD. No change to medication  - atorvastatin (LIPITOR) 80 MG tablet; Take 1 tablet (80 mg total) by mouth once daily.  Dispense: 90 tablet; Refill: 3  - fenofibrate 160 MG Tab; Take 1 tablet (160 mg total) by mouth once daily.  Dispense: 90 tablet; Refill: 3    2. Hypertension associated with diabetes  Followed by renal. Refer to renal for HTN management  - lisinopril 10 MG tablet; Take 1 tablet (10 mg total) by mouth once daily.  Dispense: 30 tablet; Refill: 2  - metoprolol tartrate (LOPRESSOR) 50 MG tablet; Take 1 tablet (50 mg total) by mouth once daily.  Dispense: 180 tablet; Refill: 1  - NIFEdipine (PROCARDIA-XL) 90 MG (OSM) 24 hr tablet; Take 1 tablet (90 mg total) by mouth once daily.  Dispense: 90 tablet; Refill: 1  - Ambulatory Referral to Cardiology    3. Hyperlipidemia associated with type 2 diabetes mellitus  See above    4. Hypertensive left ventricular hypertrophy, without heart failure  Referred to cardiology for followup  - furosemide (LASIX) 20 MG tablet; Take 1  tablet (20 mg total) by mouth every 72 hours.; Refill: 0  - metoprolol tartrate (LOPRESSOR) 50 MG tablet; Take 1 tablet (50 mg total) by mouth once daily.  Dispense: 180 tablet; Refill: 1  - Ambulatory Referral to Cardiology  - SUBSEQUENT HOME HEALTH ORDERS    5. Obesity (BMI 30.0-34.9)  Did not address at this time    6. Uncontrolled type 2 diabetes mellitus with stage 3 chronic kidney disease, without long-term current use of insulin  Recent blood sugars, changed medications and will assess HbA1c in two months  - glipiZIDE (GLUCOTROL) 5 MG TR24; Take 1 tablet (5 mg total) by mouth 2 times daily 2 hours after meal.  Dispense: 180 tablet; Refill: 3  - SUBSEQUENT HOME HEALTH ORDERS    7. Lack of access to transportation    - SUBSEQUENT HOME HEALTH ORDERS    8. Type 2 diabetes mellitus with diabetic neuropathy, unspecified long term insulin use status  Leg cramps likely secondary to her electrolyte imbalance. Trial gabapentin.  - gabapentin (NEURONTIN) 300 MG capsule; Take 1 capsule (300 mg total) by mouth every evening.  Dispense: 90 capsule; Refill: 3

## 2018-03-27 ENCOUNTER — TELEPHONE (OUTPATIENT)
Dept: FAMILY MEDICINE | Facility: CLINIC | Age: 53
End: 2018-03-27

## 2018-03-27 NOTE — TELEPHONE ENCOUNTER
Called Gabrielle with [employer]. Gabrielle states pt is having some family issues and is very anxious with a BP of 188/100. Pt denies any other symptoms other than feeling anxious. Informed Gabrielle its likely she will need to go to the ER to be evaluated. I will discuss with Dr. Hassan and return her call. Gabrielle verbalized understanding with no further questions.     ----- Message from Sofie Taylor sent at 3/27/2018 10:25 AM CDT -----  [facility]   Calling   To  Speak to the  Nurse //  Pt  BP  188 /100/  Pt  Is  Having   Anxiety  Due to  Family issues //   Please call gabrielle  At //[phone]

## 2018-03-27 NOTE — TELEPHONE ENCOUNTER
Called Vicky regarding additional information per Dr. Hassan. Per Dr. Hassan and is discussion pt doesn't need to go to the ER unless symptomatic and should follow up in clinic. Vicky states pt rechecked bp and states it is trending down and pt is less anxious. Vicky states pt will call back to scheduled follow up appt. Vicky verbalized understanding with no further questions.

## 2018-04-11 ENCOUNTER — OFFICE VISIT (OUTPATIENT)
Dept: CARDIOLOGY | Facility: CLINIC | Age: 53
End: 2018-04-11
Payer: MEDICARE

## 2018-04-11 VITALS
DIASTOLIC BLOOD PRESSURE: 75 MMHG | HEIGHT: 69 IN | HEART RATE: 60 BPM | WEIGHT: 206.63 LBS | SYSTOLIC BLOOD PRESSURE: 147 MMHG | BODY MASS INDEX: 30.61 KG/M2

## 2018-04-11 DIAGNOSIS — I73.9 PAD (PERIPHERAL ARTERY DISEASE): ICD-10-CM

## 2018-04-11 DIAGNOSIS — E65 ABDOMINAL OBESITY: ICD-10-CM

## 2018-04-11 DIAGNOSIS — G47.33 OSA (OBSTRUCTIVE SLEEP APNEA): ICD-10-CM

## 2018-04-11 DIAGNOSIS — Z86.79 HISTORY OF ATRIAL FIBRILLATION: ICD-10-CM

## 2018-04-11 DIAGNOSIS — N18.4 CKD STAGE 4 DUE TO TYPE 2 DIABETES MELLITUS: ICD-10-CM

## 2018-04-11 DIAGNOSIS — I15.2 HYPERTENSION ASSOCIATED WITH DIABETES: Primary | Chronic | ICD-10-CM

## 2018-04-11 DIAGNOSIS — E78.01 FAMILIAL HYPERCHOLESTEREMIA: ICD-10-CM

## 2018-04-11 DIAGNOSIS — I63.9 CEREBROVASCULAR ACCIDENT (CVA), UNSPECIFIED MECHANISM: ICD-10-CM

## 2018-04-11 DIAGNOSIS — E11.59 HYPERTENSION ASSOCIATED WITH DIABETES: Primary | Chronic | ICD-10-CM

## 2018-04-11 DIAGNOSIS — E11.22 CKD STAGE 4 DUE TO TYPE 2 DIABETES MELLITUS: ICD-10-CM

## 2018-04-11 DIAGNOSIS — I11.9 HYPERTENSIVE LEFT VENTRICULAR HYPERTROPHY, WITHOUT HEART FAILURE: ICD-10-CM

## 2018-04-11 DIAGNOSIS — Z91.89 CARDIOVASCULAR EVENT RISK: ICD-10-CM

## 2018-04-11 PROCEDURE — 3078F DIAST BP <80 MM HG: CPT | Mod: CPTII,S$GLB,, | Performed by: INTERNAL MEDICINE

## 2018-04-11 PROCEDURE — 3077F SYST BP >= 140 MM HG: CPT | Mod: CPTII,S$GLB,, | Performed by: INTERNAL MEDICINE

## 2018-04-11 PROCEDURE — 99999 PR PBB SHADOW E&M-EST. PATIENT-LVL IV: CPT | Mod: PBBFAC,,, | Performed by: INTERNAL MEDICINE

## 2018-04-11 PROCEDURE — 99214 OFFICE O/P EST MOD 30 MIN: CPT | Mod: S$GLB,,, | Performed by: INTERNAL MEDICINE

## 2018-04-11 PROCEDURE — 3045F PR MOST RECENT HEMOGLOBIN A1C LEVEL 7.0-9.0%: CPT | Mod: CPTII,S$GLB,, | Performed by: INTERNAL MEDICINE

## 2018-04-11 NOTE — PROGRESS NOTES
Subjective:    Patient ID:  Leanna García is a 52 y.o. female who presents for evaluation of hypertension    HPI   The patient is a 52 year old female referred by Dr Hassan. She has a history of poorly controlled hypertension, severe hyperlipidemia, obese, CKD 4, DM II with a past history pf paroxsymal atrial fibrillation in 2013 [Dr Scott] She has been followed by Dr Parry. She was admitted 10/16/16 with chest pain but nuclear stress was negative. Her ASCVD 10 year event rate is 10% and [id number] 3. She reports no chest pain, DUQUE or palpitations. She has diabetic  neuopathy and has not been exercising. Review of records it was noted she was on coumadin for a time in 2013 for AF. She snores, restless sleep and day time hypersomnolence.    Addentum. The EKG of 2013 that AF was diagnosed was mis- interpreted and was sinus not AF.      Impression: NORMAL MYOCARDIAL PERFUSION  1. The perfusion scan is free of evidence for myocardial ischemia or injury.   2. Resting wall motion is physiologic.   3. Resting LV function is normal.  (normal is 55 - 69)  4. The ventricular volumes are normal at rest and stress.   5. The extracardiac distribution of radioactivity is normal.           This document has been electronically    SIGNED BY: Woody Parry MD On: 10/14/2016 16:31        CONCLUSIONS     1 - Concentric hypertrophy.     2 - Normal left ventricular systolic function (EF 60-65%).     3 - Normal left ventricular diastolic function.     4 - The estimated PA systolic pressure is 22 mmHg.     5 - Difficult apical windows.             This document has been electronically    SIGNED BY: Woody Parry MD On: 10/15/2016 15:12  Lab Results   Component Value Date     04/28/2018    K 4.9 04/28/2018     (H) 04/28/2018    CO2 20 (L) 04/28/2018    BUN 53 (H) 04/28/2018    CREATININE 3.5 (H) 04/28/2018     (H) 04/28/2018    HGBA1C 7.5 (H) 04/24/2018    MG 1.8 04/28/2018    AST 19 04/24/2018    ALT 16 04/24/2018     ALBUMIN 3.2 (L) 04/24/2018    PROT 7.5 04/24/2018    BILITOT 0.2 04/24/2018    WBC 8.70 04/28/2018    HGB 8.8 (L) 04/28/2018    HCT 26.2 (L) 04/28/2018    MCV 80 (L) 04/28/2018     04/28/2018    INR 1.0 10/14/2016    TSH 3.238 10/14/2016         Lab Results   Component Value Date    CHOL 257 (H) 05/17/2017    HDL 47 05/17/2017    TRIG 199 (H) 05/17/2017       Lab Results   Component Value Date    LDLCALC 170.2 (H) 05/17/2017       Past Medical History:   Diagnosis Date    A-fib     resolved per patient    Arthritis     Bronchitis     Cardiovascular event risk, ASCVD 10-year risk 6.7% 10/15/2016    Diabetes mellitus     Diabetes mellitus type II     Encounter for blood transfusion     History of colon polyps 11/2/2016    Hyperlipidemia     Hypertension     Stroke        Current Outpatient Prescriptions:     acetaminophen (TYLENOL) 325 MG tablet, Take 325 mg by mouth every 6 (six) hours as needed for Pain (headache)., Disp: , Rfl:     albuterol 90 mcg/actuation inhaler, Inhale 2 puffs into the lungs 4 (four) times daily. (Patient taking differently: Inhale 2 puffs into the lungs every 4 (four) hours as needed. ), Disp: 1 Inhaler, Rfl: 1    ascorbic acid, vitamin C, (VITAMIN C) 500 MG tablet, Take 1 tablet (500 mg total) by mouth every evening., Disp: , Rfl:     aspirin (ECOTRIN) 81 MG EC tablet, Take 1 tablet (81 mg total) by mouth once daily., Disp: , Rfl:     atorvastatin (LIPITOR) 80 MG tablet, Take 1 tablet (80 mg total) by mouth once daily., Disp: 90 tablet, Rfl: 3    blood sugar diagnostic (ACCU-CHEK KAYLIE PLUS TEST STRP) Strp, 1 strip by Misc.(Non-Drug; Combo Route) route 3 (three) times daily., Disp: 300 strip, Rfl: 3    fenofibrate 160 MG Tab, Take 1 tablet (160 mg total) by mouth once daily., Disp: 90 tablet, Rfl: 3    ferrous sulfate 325 (65 FE) MG EC tablet, Take 1 tablet (325 mg total) by mouth 2 (two) times daily with meals., Disp: , Rfl: 0    furosemide (LASIX) 20 MG tablet,  Take 1 tablet (20 mg total) by mouth every 72 hours. (Patient taking differently: Take 20 mg by mouth once daily. ), Disp: , Rfl: 0    gabapentin (NEURONTIN) 300 MG capsule, Take 1 capsule (300 mg total) by mouth every evening., Disp: 90 capsule, Rfl: 3    glipiZIDE (GLUCOTROL) 5 MG TR24, Take 1 tablet (5 mg total) by mouth 2 times daily 2 hours after meal., Disp: 180 tablet, Rfl: 3    lisinopril 10 MG tablet, Take 1 tablet (10 mg total) by mouth once daily., Disp: 30 tablet, Rfl: 2    montelukast (SINGULAIR) 10 mg tablet, Take 1 tablet (10 mg total) by mouth every evening., Disp: 90 tablet, Rfl: 1    NIFEdipine (PROCARDIA-XL) 90 MG (OSM) 24 hr tablet, Take 1 tablet (90 mg total) by mouth once daily., Disp: 90 tablet, Rfl: 1    doxazosin (CARDURA) 4 MG tablet, Take 1 tablet (4 mg total) by mouth once daily., Disp: 30 tablet, Rfl: 11    ezetimibe (ZETIA) 10 mg tablet, Take 1 tablet (10 mg total) by mouth once daily., Disp: 90 tablet, Rfl: 3    metoprolol tartrate (LOPRESSOR) 50 MG tablet, Take 1 tablet (50 mg total) by mouth every evening., Disp: 180 tablet, Rfl: 1        Review of Systems   Constitution: Negative for decreased appetite, diaphoresis, fever, weakness, malaise/fatigue, weight gain and weight loss.   HENT: Negative for congestion, ear discharge, ear pain and nosebleeds.    Eyes: Negative for blurred vision, double vision and visual disturbance.   Cardiovascular: Negative for chest pain, claudication, cyanosis, dyspnea on exertion, irregular heartbeat, leg swelling, near-syncope, orthopnea, palpitations, paroxysmal nocturnal dyspnea and syncope.   Respiratory: Negative for cough, hemoptysis, shortness of breath, sleep disturbances due to breathing, snoring, sputum production and wheezing.    Endocrine: Negative for polydipsia, polyphagia and polyuria.   Hematologic/Lymphatic: Negative for adenopathy and bleeding problem. Does not bruise/bleed easily.   Skin: Negative for color change, nail  "changes, poor wound healing and rash.   Musculoskeletal: Negative for muscle cramps and muscle weakness.   Gastrointestinal: Negative for abdominal pain, anorexia, change in bowel habit, hematochezia, nausea and vomiting.   Genitourinary: Negative for dysuria, frequency and hematuria.   Neurological: Positive for paresthesias. Negative for brief paralysis, difficulty with concentration, excessive daytime sleepiness, dizziness, focal weakness, headaches, light-headedness, seizures and vertigo.   Psychiatric/Behavioral: Negative for altered mental status and depression.   Allergic/Immunologic: Negative for persistent infections.        Objective:BP (!) 147/75 (BP Location: Left arm, Patient Position: Sitting, BP Method: Medium (Automatic))   Pulse 60   Ht 5' 9" (1.753 m)   Wt 93.7 kg (206 lb 9.6 oz)   BMI 30.51 kg/m²         Physical Exam   Constitutional: She is oriented to person, place, and time. She appears well-developed and well-nourished.   obese   HENT:   Head: Normocephalic.   Right Ear: External ear normal.   Left Ear: External ear normal.   Nose: Nose normal.   Inspection of lips, teeth and gums normal   Eyes: Conjunctivae and EOM are normal. Pupils are equal, round, and reactive to light. No scleral icterus.   Neck: Normal range of motion. No JVD present. No tracheal deviation present. No thyromegaly present.   Cardiovascular: Normal rate, regular rhythm, normal heart sounds and intact distal pulses.  Exam reveals no gallop and no friction rub.    No murmur heard.  Pulses:       Femoral pulses are 2+ on the right side, and 1+ on the left side.       Dorsalis pedis pulses are 0 on the right side, and 0 on the left side.        Posterior tibial pulses are 0 on the right side, and 0 on the left side.   Pulmonary/Chest: Effort normal and breath sounds normal. No respiratory distress. She has no wheezes. She has no rales. She exhibits no tenderness.   Abdominal: Soft. Bowel sounds are normal. She exhibits " no distension. There is no hepatosplenomegaly. There is no tenderness. There is no guarding.   Musculoskeletal: Normal range of motion. She exhibits no edema or tenderness.   Lymphadenopathy:   Palpation of lymph nodes of neck and groin normal   Neurological: She is oriented to person, place, and time. No cranial nerve deficit. She exhibits normal muscle tone. Coordination normal.   Skin: Skin is warm and dry. No rash noted. No erythema. No pallor.   Palpation of skin normal   Psychiatric: She has a normal mood and affect. Her behavior is normal. Judgment and thought content normal.         Assessment:       1. Hypertension associated with diabetes    2.    3. Cardiovascular event risk, ASCVD 10-year risk 10.5%, 2015    4. Hypertensive left ventricular hypertrophy, without heart failure    5. Familial hypercholesteremia, baseline     6. Uncontrolled type 2 diabetes mellitus with stage 3 chronic kidney disease, without long-term current use of insulin    7. CKD stage 4 due to type 2 diabetes mellitus    8. Abdominal obesity    9. PAD (peripheral artery disease)    10. Cerebrovascular accident (CVA), unspecified mechanism         Plan:       Leanna was seen today for atrial fibrillation and congestive heart failure.    Diagnoses and all orders for this visit:    Hypertension associated with diabetes        Cardiovascular event risk, ASCVD 10-year risk 10.5%, 2015    Hypertensive left ventricular hypertrophy, without heart failure    Familial hypercholesteremia, baseline     Uncontrolled type 2 diabetes mellitus with stage 3 chronic kidney disease, without long-term current use of insulin    CKD stage 4 due to type 2 diabetes mellitus    Abdominal obesity    PAD (peripheral artery disease)    Cerebrovascular accident (CVA), unspecified mechanism

## 2018-04-24 ENCOUNTER — HOSPITAL ENCOUNTER (INPATIENT)
Facility: HOSPITAL | Age: 53
LOS: 4 days | Discharge: HOME HEALTH | DRG: 291 | End: 2018-04-28
Attending: EMERGENCY MEDICINE | Admitting: INTERNAL MEDICINE
Payer: MEDICARE

## 2018-04-24 DIAGNOSIS — I10 HTN (HYPERTENSION): ICD-10-CM

## 2018-04-24 DIAGNOSIS — I10 HYPERTENSION: ICD-10-CM

## 2018-04-24 DIAGNOSIS — N18.4 ACUTE RENAL FAILURE WITH ACUTE TUBULAR NECROSIS SUPERIMPOSED ON STAGE 4 CHRONIC KIDNEY DISEASE: ICD-10-CM

## 2018-04-24 DIAGNOSIS — I50.32 CHRONIC DIASTOLIC HEART FAILURE: ICD-10-CM

## 2018-04-24 DIAGNOSIS — E11.22 CKD STAGE 4 DUE TO TYPE 2 DIABETES MELLITUS: ICD-10-CM

## 2018-04-24 DIAGNOSIS — R51.9 ACUTE NONINTRACTABLE HEADACHE, UNSPECIFIED HEADACHE TYPE: ICD-10-CM

## 2018-04-24 DIAGNOSIS — N18.4 CKD STAGE 4 DUE TO TYPE 2 DIABETES MELLITUS: ICD-10-CM

## 2018-04-24 DIAGNOSIS — I15.2 HYPERTENSION ASSOCIATED WITH DIABETES: Chronic | ICD-10-CM

## 2018-04-24 DIAGNOSIS — I11.9 HYPERTENSIVE LEFT VENTRICULAR HYPERTROPHY, WITHOUT HEART FAILURE: ICD-10-CM

## 2018-04-24 DIAGNOSIS — E11.59 HYPERTENSION ASSOCIATED WITH DIABETES: Chronic | ICD-10-CM

## 2018-04-24 DIAGNOSIS — N17.0 ACUTE RENAL FAILURE WITH ACUTE TUBULAR NECROSIS SUPERIMPOSED ON STAGE 4 CHRONIC KIDNEY DISEASE: ICD-10-CM

## 2018-04-24 DIAGNOSIS — I10 HYPERTENSION, UNSPECIFIED TYPE: Primary | ICD-10-CM

## 2018-04-24 PROBLEM — E11.9 DM2 (DIABETES MELLITUS, TYPE 2): Status: ACTIVE | Noted: 2018-04-24

## 2018-04-24 PROBLEM — N17.9 AKI (ACUTE KIDNEY INJURY): Status: ACTIVE | Noted: 2018-04-24

## 2018-04-24 PROBLEM — E78.5 HYPERLIPEMIA: Status: ACTIVE | Noted: 2018-04-24

## 2018-04-24 LAB
ALBUMIN SERPL BCP-MCNC: 3.2 G/DL
ALP SERPL-CCNC: 67 U/L
ALT SERPL W/O P-5'-P-CCNC: 16 U/L
AMORPH CRY URNS QL MICRO: ABNORMAL
ANION GAP SERPL CALC-SCNC: 9 MMOL/L
AST SERPL-CCNC: 19 U/L
BACTERIA #/AREA URNS HPF: ABNORMAL /HPF
BASOPHILS # BLD AUTO: 0 K/UL
BASOPHILS NFR BLD: 0.2 %
BILIRUB SERPL-MCNC: 0.2 MG/DL
BILIRUB UR QL STRIP: NEGATIVE
BNP SERPL-MCNC: 735 PG/ML
BUN SERPL-MCNC: 46 MG/DL
CALCIUM SERPL-MCNC: 9.8 MG/DL
CHLORIDE SERPL-SCNC: 105 MMOL/L
CK SERPL-CCNC: 264 U/L
CLARITY UR: CLEAR
CO2 SERPL-SCNC: 23 MMOL/L
COLOR UR: YELLOW
CREAT SERPL-MCNC: 3.1 MG/DL
DIFFERENTIAL METHOD: ABNORMAL
EOSINOPHIL # BLD AUTO: 0.1 K/UL
EOSINOPHIL NFR BLD: 1.5 %
ERYTHROCYTE [DISTWIDTH] IN BLOOD BY AUTOMATED COUNT: 15.4 %
EST. GFR  (AFRICAN AMERICAN): 19 ML/MIN/1.73 M^2
EST. GFR  (NON AFRICAN AMERICAN): 17 ML/MIN/1.73 M^2
ESTIMATED AVG GLUCOSE: 169 MG/DL
GLUCOSE SERPL-MCNC: 408 MG/DL
GLUCOSE UR QL STRIP: ABNORMAL
GRAN CASTS #/AREA URNS LPF: 8 /LPF
HBA1C MFR BLD HPLC: 7.5 %
HCT VFR BLD AUTO: 34.1 %
HGB BLD-MCNC: 11.4 G/DL
HGB UR QL STRIP: ABNORMAL
HYALINE CASTS #/AREA URNS LPF: 3 /LPF
KETONES UR QL STRIP: NEGATIVE
LEUKOCYTE ESTERASE UR QL STRIP: NEGATIVE
LYMPHOCYTES # BLD AUTO: 1.3 K/UL
LYMPHOCYTES NFR BLD: 14.8 %
MAGNESIUM SERPL-MCNC: 1.9 MG/DL
MCH RBC QN AUTO: 26.6 PG
MCHC RBC AUTO-ENTMCNC: 33.4 G/DL
MCV RBC AUTO: 80 FL
MICROSCOPIC COMMENT: ABNORMAL
MONOCYTES # BLD AUTO: 0.3 K/UL
MONOCYTES NFR BLD: 2.8 %
NEUTROPHILS # BLD AUTO: 7.2 K/UL
NEUTROPHILS NFR BLD: 80.7 %
NITRITE UR QL STRIP: NEGATIVE
PH UR STRIP: 6 [PH] (ref 5–8)
PLATELET # BLD AUTO: 339 K/UL
PMV BLD AUTO: 8.5 FL
POCT GLUCOSE: 187 MG/DL (ref 70–110)
POCT GLUCOSE: 282 MG/DL (ref 70–110)
POCT GLUCOSE: 344 MG/DL (ref 70–110)
POTASSIUM SERPL-SCNC: 4.9 MMOL/L
PROT SERPL-MCNC: 7.5 G/DL
PROT UR QL STRIP: ABNORMAL
RBC # BLD AUTO: 4.29 M/UL
RBC #/AREA URNS HPF: 10 /HPF (ref 0–4)
SODIUM SERPL-SCNC: 137 MMOL/L
SP GR UR STRIP: 1.02 (ref 1–1.03)
SQUAMOUS #/AREA URNS HPF: 7 /HPF
TROPONIN I SERPL DL<=0.01 NG/ML-MCNC: 0.03 NG/ML
URATE CRY URNS QL MICRO: ABNORMAL
URN SPEC COLLECT METH UR: ABNORMAL
UROBILINOGEN UR STRIP-ACNC: NEGATIVE EU/DL
WBC # BLD AUTO: 9 K/UL
WBC #/AREA URNS HPF: 29 /HPF (ref 0–5)
YEAST URNS QL MICRO: ABNORMAL

## 2018-04-24 PROCEDURE — 99291 CRITICAL CARE FIRST HOUR: CPT | Mod: 25

## 2018-04-24 PROCEDURE — 84484 ASSAY OF TROPONIN QUANT: CPT

## 2018-04-24 PROCEDURE — 63600175 PHARM REV CODE 636 W HCPCS: Performed by: INTERNAL MEDICINE

## 2018-04-24 PROCEDURE — 83735 ASSAY OF MAGNESIUM: CPT

## 2018-04-24 PROCEDURE — 36415 COLL VENOUS BLD VENIPUNCTURE: CPT

## 2018-04-24 PROCEDURE — 81000 URINALYSIS NONAUTO W/SCOPE: CPT

## 2018-04-24 PROCEDURE — 96374 THER/PROPH/DIAG INJ IV PUSH: CPT

## 2018-04-24 PROCEDURE — 80053 COMPREHEN METABOLIC PANEL: CPT

## 2018-04-24 PROCEDURE — 83880 ASSAY OF NATRIURETIC PEPTIDE: CPT

## 2018-04-24 PROCEDURE — 83036 HEMOGLOBIN GLYCOSYLATED A1C: CPT

## 2018-04-24 PROCEDURE — 25000003 PHARM REV CODE 250: Performed by: INTERNAL MEDICINE

## 2018-04-24 PROCEDURE — 82550 ASSAY OF CK (CPK): CPT

## 2018-04-24 PROCEDURE — 20000000 HC ICU ROOM

## 2018-04-24 PROCEDURE — 85025 COMPLETE CBC W/AUTO DIFF WBC: CPT

## 2018-04-24 PROCEDURE — 25000003 PHARM REV CODE 250: Performed by: EMERGENCY MEDICINE

## 2018-04-24 RX ORDER — PANTOPRAZOLE SODIUM 40 MG/1
40 TABLET, DELAYED RELEASE ORAL DAILY
Status: DISCONTINUED | OUTPATIENT
Start: 2018-04-24 | End: 2018-04-28 | Stop reason: HOSPADM

## 2018-04-24 RX ORDER — NICARDIPINE HYDROCHLORIDE 0.2 MG/ML
5 INJECTION INTRAVENOUS CONTINUOUS
Status: DISCONTINUED | OUTPATIENT
Start: 2018-04-24 | End: 2018-04-25

## 2018-04-24 RX ORDER — ZOLPIDEM TARTRATE 5 MG/1
5 TABLET ORAL NIGHTLY PRN
Status: DISCONTINUED | OUTPATIENT
Start: 2018-04-24 | End: 2018-04-28 | Stop reason: HOSPADM

## 2018-04-24 RX ORDER — ONDANSETRON 2 MG/ML
4 INJECTION INTRAMUSCULAR; INTRAVENOUS EVERY 8 HOURS PRN
Status: DISCONTINUED | OUTPATIENT
Start: 2018-04-24 | End: 2018-04-28 | Stop reason: HOSPADM

## 2018-04-24 RX ORDER — SODIUM CHLORIDE 0.9 % (FLUSH) 0.9 %
3 SYRINGE (ML) INJECTION
Status: DISCONTINUED | OUTPATIENT
Start: 2018-04-24 | End: 2018-04-28 | Stop reason: HOSPADM

## 2018-04-24 RX ORDER — ENOXAPARIN SODIUM 100 MG/ML
30 INJECTION SUBCUTANEOUS EVERY 24 HOURS
Status: DISCONTINUED | OUTPATIENT
Start: 2018-04-24 | End: 2018-04-28 | Stop reason: HOSPADM

## 2018-04-24 RX ORDER — IBUPROFEN 200 MG
24 TABLET ORAL
Status: DISCONTINUED | OUTPATIENT
Start: 2018-04-24 | End: 2018-04-28 | Stop reason: HOSPADM

## 2018-04-24 RX ORDER — PROMETHAZINE HYDROCHLORIDE 25 MG/ML
INJECTION, SOLUTION INTRAMUSCULAR; INTRAVENOUS
Status: DISPENSED
Start: 2018-04-24 | End: 2018-04-25

## 2018-04-24 RX ORDER — MAGNESIUM SULFATE HEPTAHYDRATE 40 MG/ML
2 INJECTION, SOLUTION INTRAVENOUS
Status: DISCONTINUED | OUTPATIENT
Start: 2018-04-24 | End: 2018-04-26

## 2018-04-24 RX ORDER — INSULIN ASPART 100 [IU]/ML
1-10 INJECTION, SOLUTION INTRAVENOUS; SUBCUTANEOUS
Status: DISCONTINUED | OUTPATIENT
Start: 2018-04-24 | End: 2018-04-28 | Stop reason: HOSPADM

## 2018-04-24 RX ORDER — IBUPROFEN 200 MG
16 TABLET ORAL
Status: DISCONTINUED | OUTPATIENT
Start: 2018-04-24 | End: 2018-04-28 | Stop reason: HOSPADM

## 2018-04-24 RX ORDER — ACETAMINOPHEN AND CODEINE PHOSPHATE 300; 30 MG/1; MG/1
1 TABLET ORAL EVERY 4 HOURS PRN
Status: DISCONTINUED | OUTPATIENT
Start: 2018-04-24 | End: 2018-04-28 | Stop reason: HOSPADM

## 2018-04-24 RX ORDER — SODIUM CHLORIDE 9 MG/ML
INJECTION, SOLUTION INTRAVENOUS CONTINUOUS
Status: DISCONTINUED | OUTPATIENT
Start: 2018-04-24 | End: 2018-04-24

## 2018-04-24 RX ORDER — GLUCAGON 1 MG
1 KIT INJECTION
Status: DISCONTINUED | OUTPATIENT
Start: 2018-04-24 | End: 2018-04-28 | Stop reason: HOSPADM

## 2018-04-24 RX ADMIN — INSULIN ASPART 2 UNITS: 100 INJECTION, SOLUTION INTRAVENOUS; SUBCUTANEOUS at 04:04

## 2018-04-24 RX ADMIN — NICARDIPINE HYDROCHLORIDE 5 MG/HR: 0.2 INJECTION, SOLUTION INTRAVENOUS at 11:04

## 2018-04-24 RX ADMIN — ACETAMINOPHEN AND CODEINE PHOSPHATE 1 TABLET: 300; 30 TABLET ORAL at 02:04

## 2018-04-24 RX ADMIN — PROMETHAZINE HYDROCHLORIDE 12.5 MG: 25 INJECTION INTRAMUSCULAR; INTRAVENOUS at 02:04

## 2018-04-24 RX ADMIN — INSULIN ASPART 6 UNITS: 100 INJECTION, SOLUTION INTRAVENOUS; SUBCUTANEOUS at 01:04

## 2018-04-24 RX ADMIN — PANTOPRAZOLE SODIUM 40 MG: 40 TABLET, DELAYED RELEASE ORAL at 01:04

## 2018-04-24 RX ADMIN — ONDANSETRON HYDROCHLORIDE 4 MG: 2 INJECTION, SOLUTION INTRAMUSCULAR; INTRAVENOUS at 01:04

## 2018-04-24 RX ADMIN — ENOXAPARIN SODIUM 30 MG: 100 INJECTION SUBCUTANEOUS at 04:04

## 2018-04-24 NOTE — HPI
Present to the ER with elevated BP. Noted today when she developed HA after crawfish boil. SBP in . No neurologic findings. Has h/o CKD with Cret of about 2.0 which is now ~3.0. Her past hx is significant for DM 2, and prior TIA. She is being admitted for BP control and IV hydration.

## 2018-04-24 NOTE — CONSULTS
INPATIENT NEPHROLOGY CONSULT   [facility] NEPHROLOGY    Patient Name: Leanna García  Date: 04/24/2018    Reason for consultation: GODWIN    Chief Complaint:   Chief Complaint   Patient presents with    elevated BP       History of Present Illness:  53 y/o F with hx of HTN, HLD, AF, CVA, and DM who p/w elevated blood pressure this morning. She states she developed a headache which is usually an indication that her blood pressure high. She tried checking her BP but it wouldn't register on her home BP cuff. She took two tylenol with relief. Associated symptoms include nausea and cough. She denies cp or dyspnea. She takes metoprolol, nifedipine, lisinopril and lasix at home. She denies any other exac/reliev factors. We have been consulted for GODWIN.     Reviewed old records- She is a patient of Dr. Carlson. Last seen in clinic on 3/22/18. Cr 2.4. /82. Lasix was changed to daily.      She says her baseline SBP at home is 150.     2/2018 renal duplex neg for RUDDY    Plan of Care:    Assessment:  Vivi, baseline stage IV CKD (Cr 2-2.5)  HTN urgency  SHPT  Anemia of CKD    Plan:    1. Acute on Chronic Kidney Injury- Suspect prerenal vs ischemic ATN in the setting of HTN urgency. Will work on BP management. Hold ACE/diuretic for now. No NSAIDs or IV contrast. Ordered UA for completeness. See no acute indication for renal imaging. Need strict measurement of UOP.     2. Volume/Blood Pressure- She is on a cardene gtt. Ordered BNP, CE, CXR and echo. Prior renal duplex was negative for RUDDY- cannot get CTA due to advanced CKD. Can resume BB. If renal function doesn't settle down, may need to hold ACE indefinitely and use hydralazine instead. Will most likely resume oral diuretic in the next 1-2 days.     3. SHPT- Prior parameters from 12/2017 were at goal.     4. Anemia of CKD- Prior iron studies from 12/2017 were c/w chronic dx. Hb is at goal for CKD. Can resume home dose of iron pills.     Thank you for allowing us to  participate in this patient's care. We will continue to follow.    Vital Signs:  Temp Readings from Last 3 Encounters:   04/24/18 98.2 °F (36.8 °C) (Oral)   02/11/18 97.9 °F (36.6 °C) (Oral)   01/18/18 98.2 °F (36.8 °C) (Oral)       Pulse Readings from Last 3 Encounters:   04/24/18 75   04/11/18 60   03/23/18 (!) 59       BP Readings from Last 3 Encounters:   04/24/18 (!) 214/99   04/11/18 (!) 147/75   03/23/18 (!) 168/78       Weight:  Wt Readings from Last 3 Encounters:   04/24/18 85.8 kg (189 lb 2.5 oz)   04/11/18 93.7 kg (206 lb 9.6 oz)   03/23/18 94.2 kg (207 lb 10.8 oz)       Past Medical & Surgical History:  Past Medical History:   Diagnosis Date    A-fib     resolved per patient    Arthritis     Bronchitis     Cardiovascular event risk, ASCVD 10-year risk 6.7% 10/15/2016    Diabetes mellitus     Diabetes mellitus type II     Encounter for blood transfusion     History of colon polyps 11/2/2016    Hyperlipidemia     Hypertension     Stroke        Past Surgical History:   Procedure Laterality Date    COLONOSCOPY N/A 10/5/2016    Procedure: COLONOSCOPY;  Surgeon: Pillo Chanel MD;  Location: [facility];  Service: Endoscopy;  Laterality: N/A;    HERNIA REPAIR Bilateral 11/22/2016    inguinal    HYSTERECTOMY         Past Social History:  Social History     Social History    Marital status:      Spouse name: N/A    Number of children: N/A    Years of education: N/A     Social History Main Topics    Smoking status: Never Smoker    Smokeless tobacco: Never Used    Alcohol use No    Drug use: No    Sexual activity: Yes     Partners: Male     Other Topics Concern    None     Social History Narrative    None       Medications:  No current facility-administered medications on file prior to encounter.      Current Outpatient Prescriptions on File Prior to Encounter   Medication Sig Dispense Refill    acetaminophen (TYLENOL) 325 MG tablet Take 325 mg by mouth every 6 (six) hours as needed  for Pain (headache).      ascorbic acid, vitamin C, (VITAMIN C) 500 MG tablet Take 1 tablet (500 mg total) by mouth every evening.      aspirin (ECOTRIN) 81 MG EC tablet Take 1 tablet (81 mg total) by mouth once daily.      atorvastatin (LIPITOR) 80 MG tablet Take 1 tablet (80 mg total) by mouth once daily. 90 tablet 3    fenofibrate 160 MG Tab Take 1 tablet (160 mg total) by mouth once daily. 90 tablet 3    ferrous sulfate 325 (65 FE) MG EC tablet Take 1 tablet (325 mg total) by mouth 2 (two) times daily with meals.  0    gabapentin (NEURONTIN) 300 MG capsule Take 1 capsule (300 mg total) by mouth every evening. 90 capsule 3    glipiZIDE (GLUCOTROL) 5 MG TR24 Take 1 tablet (5 mg total) by mouth 2 times daily 2 hours after meal. 180 tablet 3    metoprolol tartrate (LOPRESSOR) 50 MG tablet Take 1 tablet (50 mg total) by mouth once daily. 180 tablet 1    montelukast (SINGULAIR) 10 mg tablet Take 1 tablet (10 mg total) by mouth every evening. 90 tablet 1    albuterol 90 mcg/actuation inhaler Inhale 2 puffs into the lungs 4 (four) times daily. (Patient taking differently: Inhale 2 puffs into the lungs every 4 (four) hours as needed. ) 1 Inhaler 1    blood sugar diagnostic (ACCU-CHEK KAYLIE PLUS TEST STRP) Strp 1 strip by Misc.(Non-Drug; Combo Route) route 3 (three) times daily. 300 strip 3    furosemide (LASIX) 20 MG tablet Take 1 tablet (20 mg total) by mouth every 72 hours. (Patient taking differently: Take 20 mg by mouth once daily. )  0    lisinopril 10 MG tablet Take 1 tablet (10 mg total) by mouth once daily. 30 tablet 2    NIFEdipine (PROCARDIA-XL) 90 MG (OSM) 24 hr tablet Take 1 tablet (90 mg total) by mouth once daily. 90 tablet 1    [DISCONTINUED] clorazepate (TRANXENE) 3.75 MG Tab Take 7.5 mg by mouth nightly as needed.        Scheduled Meds:   enoxaparin  30 mg Subcutaneous Daily    pantoprazole  40 mg Oral Daily     Continuous Infusions:   sodium chloride 0.9% Stopped (04/24/18 3553)     "nicardipine 2.5 mg/hr (04/24/18 1304)     PRN Meds:.acetaminophen-codeine 300-30mg, dextrose 50%, dextrose 50%, glucagon (human recombinant), glucose, glucose, insulin aspart U-100, magnesium sulfate IVPB, ondansetron, sodium chloride 0.9%, zolpidem    Allergies:  Dilaudid [hydromorphone (bulk)] and Lortab [hydrocodone-acetaminophen]    Past Family History:  Reviewed; refer to Hospitalist Admission Note    Review of Systems:  Review of Systems - All 14 systems reviewed and negative, except as noted in HPI    Physical Exam:  BP (!) 214/99 (BP Location: Left arm, Patient Position: Lying)   Pulse 75   Temp 98.2 °F (36.8 °C) (Oral)   Resp 19   Ht 5' 9" (1.753 m)   Wt 85.8 kg (189 lb 2.5 oz)   SpO2 97%   Breastfeeding? No   BMI 27.93 kg/m²     INS/OUTS:  No intake/output data recorded.  No intake/output data recorded.    General Appearance:    NAD, AAO x 3, cooperative, appears stated age   Head:    Normocephalic, atraumatic   Eyes:    PER, EOMI, and conjunctiva/sclera clear bilaterally        Throat:   Moist mucus membranes, no thrush or oral lesions, normal      dentition   Back:     No CVA tenderness   Lungs:     Coarse to auscultation bilaterally, no wheeze, crackles, rales   or rhonchi, symmetric air movement, respirations unlabored   Heart:    Regular rate and rhythm, S1 and S2 normal, no murmur, rub   or gallop   Abdomen:     Soft, non-tender, non-distended, bowel sounds active all four   quadrants, no RT or guarding, no masses, no organomegaly   Extremities:   Warm and well perfused, distal pulses intact, no cyanosis or    peripheral edema   MSK:   No joint or muscle swelling, tenderness or deformity   Skin:   Skin color, texture, turgor normal, no rashes or lesions   Neurologic/Psychiatric:   CNII-XII intact, normal strength and sensation       throughout, no asterixis; normal affect, memory, judgement     and insight     Results:  Lab Results   Component Value Date     04/24/2018    K 4.9 " 04/24/2018     04/24/2018    CO2 23 04/24/2018    BUN 46 (H) 04/24/2018    CREATININE 3.1 (H) 04/24/2018    CALCIUM 9.8 04/24/2018    ANIONGAP 9 04/24/2018    ESTGFRAFRICA 19 (A) 04/24/2018    EGFRNONAA 17 (A) 04/24/2018       Lab Results   Component Value Date    CALCIUM 9.8 04/24/2018    PHOS 5.0 (H) 02/11/2018         Recent Labs  Lab 04/24/18  0949   WBC 9.00   RBC 4.29   HGB 11.4*   HCT 34.1*      MCV 80*   MCH 26.6*   MCHC 33.4       I have personally reviewed pertinent radiological imaging and reports.    Ashleigh oSria MD MPH  [facility]  [address] [place] [id number]  [phone] (p)  [phone] (f)

## 2018-04-24 NOTE — ED PROVIDER NOTES
Encounter Date: 4/24/2018       History     Chief Complaint   Patient presents with    elevated BP     Patient is a 52 year old female who presents with elevated blood pressure this morning. She has PMH significant for a-fib, arthritis, DM-2, hypertension, CVA and hyperlipidemia. She states she developed a headache which is usually and indication that her blood pressure is elevated, she attempted to take it at home but it would not register on her home machine. She reports that this has happened in the past and she requires IV medication to bring it down. She states the nausea is secondary to her headache. She reports she took two tylenol with relief in her headache. She denied any visual changes, weakness, speech difficulty or facial drooping. She denied any chest pain or shortness of breath. She reports taking her medications as prescribed. She denied any change in her hypertensive medications recently.       The history is provided by the patient.     Review of patient's allergies indicates:   Allergen Reactions    Dilaudid [hydromorphone (bulk)] Nausea And Vomiting     Severe GI upset    Lortab [hydrocodone-acetaminophen] Itching     Past Medical History:   Diagnosis Date    A-fib     resolved per patient    Arthritis     Bronchitis     Cardiovascular event risk, ASCVD 10-year risk 6.7% 10/15/2016    Diabetes mellitus     Diabetes mellitus type II     Encounter for blood transfusion     History of colon polyps 11/2/2016    Hyperlipidemia     Hypertension     Stroke      Past Surgical History:   Procedure Laterality Date    COLONOSCOPY N/A 10/5/2016    Procedure: COLONOSCOPY;  Surgeon: Pillo Chanel MD;  Location: [facility];  Service: Endoscopy;  Laterality: N/A;    HERNIA REPAIR Bilateral 11/22/2016    inguinal    HYSTERECTOMY       Family History   Problem Relation Age of Onset    Hyperlipidemia Mother     Hypertension Mother     Hypertension Father     Diabetes Father     Diabetes  "Sister     Diabetes Sister     Diabetes Maternal Aunt     Diabetes Maternal Grandmother     Heart disease Maternal Grandmother     Cancer Paternal Grandmother      Social History   Substance Use Topics    Smoking status: Never Smoker    Smokeless tobacco: Never Used    Alcohol use No     Review of Systems   Constitutional: Negative for activity change, appetite change, chills and fever.   HENT: Negative for congestion, rhinorrhea and sore throat.    Eyes: Negative for redness and visual disturbance.   Respiratory: Negative for cough, chest tightness and shortness of breath.    Cardiovascular: Negative for chest pain.   Gastrointestinal: Positive for nausea and vomiting. Negative for abdominal pain and diarrhea.   Genitourinary: Negative for dysuria and frequency.   Musculoskeletal: Negative for back pain, neck pain and neck stiffness.   Skin: Negative for rash.   Neurological: Positive for dizziness. Negative for syncope, numbness and headaches.       Physical Exam     Vitals:    04/24/18 1131 04/24/18 1201 04/24/18 1212 04/24/18 1248   BP: (!) 240/110 (!) 222/93 (!) 203/84 (!) 214/99   BP Location:    Left arm   Patient Position:    Lying   Pulse:  68 68 75   Resp:    19   Temp:    98.2 °F (36.8 °C)   TempSrc:    Oral   SpO2:    97%   Weight:    85.8 kg (189 lb 2.5 oz)   Height:    5' 9" (1.753 m)       Physical Exam    Constitutional: Vital signs are normal. She appears well-developed and well-nourished. She is cooperative.  Non-toxic appearance. She does not have a sickly appearance.   HENT:   Head: Normocephalic and atraumatic.   Right Ear: External ear normal.   Left Ear: External ear normal.   Nose: Nose normal.   Mouth/Throat: Oropharynx is clear and moist.   Eyes: Conjunctivae and lids are normal. Pupils are equal, round, and reactive to light.   Neck: Normal range of motion and full passive range of motion without pain. Neck supple.   Cardiovascular: Normal rate, regular rhythm and normal heart " sounds. Exam reveals no gallop and no friction rub.    No murmur heard.  Pulmonary/Chest: Breath sounds normal. She has no wheezes. She has no rhonchi. She has no rales.   Abdominal: Soft. Normal appearance. There is no tenderness. There is no rigidity, no rebound and no guarding.   Neurological: She is alert and oriented to person, place, and time.   Cranial nerves III-XII intact   Skin: Skin is warm, dry and intact. No rash noted.         ED Course   Critical Care  Date/Time: 4/24/2018 1:59 PM  Performed by: ELIJAH MCDERMOTT  Authorized by: BOONE DELEON   Direct patient critical care time: 16 minutes  Additional history critical care time: 5 minutes  Ordering / reviewing critical care time: 5 minutes  Documentation critical care time: 4 minutes  Consulting other physicians critical care time: 2 minutes  Total critical care time (exclusive of procedural time) : 32 minutes        Labs Reviewed   CBC W/ AUTO DIFFERENTIAL - Abnormal; Notable for the following:        Result Value    Hemoglobin 11.4 (*)     Hematocrit 34.1 (*)     MCV 80 (*)     MCH 26.6 (*)     RDW 15.4 (*)     MPV 8.5 (*)     Gran% 80.7 (*)     Lymph% 14.8 (*)     Mono% 2.8 (*)     All other components within normal limits   COMPREHENSIVE METABOLIC PANEL - Abnormal; Notable for the following:     Glucose 408 (*)     BUN, Bld 46 (*)     Creatinine 3.1 (*)     Albumin 3.2 (*)     eGFR if  19 (*)     eGFR if non  17 (*)     All other components within normal limits   POCT GLUCOSE MONITORING CONTINUOUS             Medical Decision Making:   History:   Old Medical Records: I decided to obtain old medical records.  Clinical Tests:   Lab Tests: Ordered and Reviewed  Radiological Study: Ordered and Reviewed              Attending Attestation:     Physician Attestation Statement for NP/PA:   I have conducted a face to face encounter with this patient in addition to the NP/PA, due to NP/PA Request    Other NP/PA  Attestation Additions:      Medical Decision Making: Leanna García is a 52 y.o. female presenting with marked hypertension with initial presentation at 260 systolic.  No definitive signs of end organ damage at this point.  Head CT done secondary to headache shows no sign of acute changes including no intracranial hemorrhage.  Blood pressure reduction initiated after discussion with hospitalist service will assume care with initiation of nicardipine and gradual reduction of blood pressure in the emergency department.  No sign of ischemic insult on examination.  No sign of other end organ dysfunction other than mild renal insufficiency and other laboratories.  Patient will be closely monitored with further management of blood pressure.                      Clinical Impression:   The primary encounter diagnosis was Hypertension, unspecified type. Diagnoses of Acute nonintractable headache, unspecified headache type and HTN (hypertension) were also pertinent to this visit.                           Margaux Jose PA-C  04/24/18 1402

## 2018-04-24 NOTE — ED NOTES
Pt states she has an unregistered bp at home which usually means her bp is high. Also c/o mild ha that she took tylenol pta .

## 2018-04-24 NOTE — PLAN OF CARE
Received consult to assist with advanced directive.  Met with pt and her spouse to complete pt's assessment.  Pt, who is independent with her self care, lives with her spouse who provides transportation.  Pt has a home glucometer and receives nursing services from [facility].  Osmin Chanel, pt's outpt  with [facility] meets with pt at her home every 2 weeks.  Pt verified her PCP as Dr. Luis Hassan, cardiologist as Dr. Cristofer Leonard, nephrologist as Dr. Carlson, and insurance as [employer].  Updated admissions with pt's correct PCP.  Pt's discharge plan is to return home with [facility].  Obtained signature for the disclosure form.     Provided pt and pt's  with advanced directive forms.  Spouse stated that they had spoken about going to an  to have sahu and POA's completed and will do so upon pt's discharge.  Encouraged pt to bring us a copy of the POA upon her next admission.      04/24/18 1550   Discharge Assessment   Assessment Type Discharge Planning Assessment   Confirmed/corrected address and phone number on facesheet? Yes   Assessment information obtained from? Patient   Prior to hospitilization cognitive status: Alert/Oriented   Prior to hospitalization functional status: Independent   Current cognitive status: Alert/Oriented   Current Functional Status: Independent   Lives With spouse   Able to Return to Prior Arrangements yes   Is patient able to care for self after discharge? Yes   Who are your caregiver(s) and their phone number(s)? (spouse Donovan García, [phone])   Patient's perception of discharge disposition home health   Readmission Within The Last 30 Days no previous admission in last 30 days   Patient currently being followed by outpatient case management? Yes  (Osmin Quinones comes to pt's home every 2 weeks for meeting)   If yes, name of outpatient case management following: insurance company assigned oupatient case management   Patient currently receives any  other outside agency services? Yes   Name and contact number of agency or person providing outside services ([facility] for nursing)   Is it the patient/care giver preference to resume care with the current outside agency? Yes   Equipment Currently Used at Home glucometer   Do you have any problems affording any of your prescribed medications? No  (pharmacy is [facility] in [place], [place])   Is the patient taking medications as prescribed? yes   Does the patient have transportation home? Yes   Transportation Available family or friend will provide   Does the patient receive services at the [facility]? No   Discharge Plan A Home Health  ([facility])   Patient/Family In Agreement With Plan yes

## 2018-04-24 NOTE — SUBJECTIVE & OBJECTIVE
Past Medical History:   Diagnosis Date    A-fib     resolved per patient    Arthritis     Bronchitis     Cardiovascular event risk, ASCVD 10-year risk 6.7% 10/15/2016    Diabetes mellitus     Diabetes mellitus type II     Encounter for blood transfusion     History of colon polyps 11/2/2016    Hyperlipidemia     Hypertension     Stroke        Past Surgical History:   Procedure Laterality Date    COLONOSCOPY N/A 10/5/2016    Procedure: COLONOSCOPY;  Surgeon: Pillo Chanel MD;  Location: [facility];  Service: Endoscopy;  Laterality: N/A;    HERNIA REPAIR Bilateral 11/22/2016    inguinal    HYSTERECTOMY         Review of patient's allergies indicates:   Allergen Reactions    Dilaudid [hydromorphone (bulk)] Nausea And Vomiting     Severe GI upset    Lortab [hydrocodone-acetaminophen] Itching       No current facility-administered medications on file prior to encounter.      Current Outpatient Prescriptions on File Prior to Encounter   Medication Sig    acetaminophen (TYLENOL) 325 MG tablet Take 325 mg by mouth every 6 (six) hours as needed for Pain (headache).    ascorbic acid, vitamin C, (VITAMIN C) 500 MG tablet Take 1 tablet (500 mg total) by mouth every evening.    aspirin (ECOTRIN) 81 MG EC tablet Take 1 tablet (81 mg total) by mouth once daily.    atorvastatin (LIPITOR) 80 MG tablet Take 1 tablet (80 mg total) by mouth once daily.    fenofibrate 160 MG Tab Take 1 tablet (160 mg total) by mouth once daily.    ferrous sulfate 325 (65 FE) MG EC tablet Take 1 tablet (325 mg total) by mouth 2 (two) times daily with meals.    gabapentin (NEURONTIN) 300 MG capsule Take 1 capsule (300 mg total) by mouth every evening.    glipiZIDE (GLUCOTROL) 5 MG TR24 Take 1 tablet (5 mg total) by mouth 2 times daily 2 hours after meal.    metoprolol tartrate (LOPRESSOR) 50 MG tablet Take 1 tablet (50 mg total) by mouth once daily.    montelukast (SINGULAIR) 10 mg tablet Take 1 tablet (10 mg total) by mouth  every evening.    albuterol 90 mcg/actuation inhaler Inhale 2 puffs into the lungs 4 (four) times daily. (Patient taking differently: Inhale 2 puffs into the lungs every 4 (four) hours as needed. )    blood sugar diagnostic (ACCU-CHEK KAYLIE PLUS TEST STRP) Strp 1 strip by Misc.(Non-Drug; Combo Route) route 3 (three) times daily.    furosemide (LASIX) 20 MG tablet Take 1 tablet (20 mg total) by mouth every 72 hours. (Patient taking differently: Take 20 mg by mouth once daily. )    lisinopril 10 MG tablet Take 1 tablet (10 mg total) by mouth once daily.    NIFEdipine (PROCARDIA-XL) 90 MG (OSM) 24 hr tablet Take 1 tablet (90 mg total) by mouth once daily.    [DISCONTINUED] clorazepate (TRANXENE) 3.75 MG Tab Take 7.5 mg by mouth nightly as needed.      Family History     Problem Relation (Age of Onset)    Cancer Paternal Grandmother    Diabetes Father, Sister, Sister, Maternal Aunt, Maternal Grandmother    Heart disease Maternal Grandmother    Hyperlipidemia Mother    Hypertension Mother, Father        Social History Main Topics    Smoking status: Never Smoker    Smokeless tobacco: Never Used    Alcohol use No    Drug use: No    Sexual activity: Yes     Partners: Male     Review of Systems   Constitutional: Negative.    HENT: Negative.    Eyes: Negative.    Respiratory: Negative.    Cardiovascular: Negative.    Gastrointestinal: Negative.    Endocrine: Negative.    Genitourinary: Negative.    Musculoskeletal: Negative.    Skin: Negative.    Neurological: Positive for headaches. Negative for dizziness, tremors, seizures, syncope, facial asymmetry, speech difficulty, weakness, light-headedness and numbness.   Hematological: Negative.    Psychiatric/Behavioral: Negative.      Objective:     Vital Signs (Most Recent):  Temp: 98.2 °F (36.8 °C) (04/24/18 1248)  Pulse: 75 (04/24/18 1248)  Resp: 19 (04/24/18 1248)  BP: (!) 214/99 (04/24/18 1248)  SpO2: 97 % (04/24/18 1248) Vital Signs (24h Range):  Temp:  [98.2 °F  (36.8 °C)-98.7 °F (37.1 °C)] 98.2 °F (36.8 °C)  Pulse:  [68-75] 75  Resp:  [14-19] 19  SpO2:  [97 %-98 %] 97 %  BP: (203-260)/() 214/99     Weight: 85.8 kg (189 lb 2.5 oz)  Body mass index is 27.93 kg/m².    Physical Exam   Constitutional: She is oriented to person, place, and time. She appears well-developed and well-nourished.   HENT:   Head: Normocephalic and atraumatic.   Right Ear: External ear normal.   Left Ear: External ear normal.   Nose: Nose normal.   Mouth/Throat: Oropharynx is clear and moist.   Eyes: Conjunctivae and EOM are normal. Pupils are equal, round, and reactive to light.   Neck: Normal range of motion. Neck supple.   Cardiovascular: Normal rate and regular rhythm.  Exam reveals no gallop and no friction rub.    Murmur heard.  Pulmonary/Chest: Effort normal and breath sounds normal.   Abdominal: Soft. Bowel sounds are normal.   Musculoskeletal: Normal range of motion.   Neurological: She is alert and oriented to person, place, and time.   Skin: Skin is warm and dry. Capillary refill takes less than 2 seconds.   Psychiatric: She has a normal mood and affect. Her behavior is normal. Judgment and thought content normal.         CRANIAL NERVES     CN II   Visual fields full to confrontation.     CN III, IV, VI   Pupils are equal, round, and reactive to light.  Extraocular motions are normal.     CN V   Facial sensation intact.     CN VII   Facial expression full, symmetric.     CN VIII   CN VIII normal.     CN IX, X   CN IX normal.     CN XI   CN XI normal.     CN XII   CN XII normal.        Significant Labs:   CBC:   Recent Labs  Lab 04/24/18  0949   WBC 9.00   HGB 11.4*   HCT 34.1*        CMP:   Recent Labs  Lab 04/24/18  0949      K 4.9      CO2 23   *   BUN 46*   CREATININE 3.1*   CALCIUM 9.8   PROT 7.5   ALBUMIN 3.2*   BILITOT 0.2   ALKPHOS 67   AST 19   ALT 16   ANIONGAP 9   EGFRNONAA 17*     Magnesium:   Recent Labs  Lab 04/24/18  0949   MG 1.9      None    Significant Imaging: I have reviewed and interpreted all pertinent imaging results/findings within the past 24 hours.

## 2018-04-24 NOTE — H&P
[facility] Hospital Medicine  History & Physical    Patient Name: Leanna García  MRN: [id number]  Admission Date: 4/24/2018  Attending Physician: Cory Norris MD  Primary Care Provider: Luis Hassan MD         Patient information was obtained from patient and ER records.     Subjective:     Principal Problem:<principal problem not specified>    Chief Complaint:   Chief Complaint   Patient presents with    elevated BP        HPI: Present to the ER with elevated BP. Noted today when she developed HA after crawfish boil. SBP in . No neurologic findings. Has h/o CKD with Cret of about 2.0 which is now ~3.0. Her past hx is significant for DM 2, and prior TIA. She is being admitted for BP control and IV hydration.    Past Medical History:   Diagnosis Date    A-fib     resolved per patient    Arthritis     Bronchitis     Cardiovascular event risk, ASCVD 10-year risk 6.7% 10/15/2016    Diabetes mellitus     Diabetes mellitus type II     Encounter for blood transfusion     History of colon polyps 11/2/2016    Hyperlipidemia     Hypertension     Stroke        Past Surgical History:   Procedure Laterality Date    COLONOSCOPY N/A 10/5/2016    Procedure: COLONOSCOPY;  Surgeon: Pillo Chanel MD;  [facility];  Service: Endoscopy;  Laterality: N/A;    HERNIA REPAIR Bilateral 11/22/2016    inguinal    HYSTERECTOMY         Review of patient's allergies indicates:   Allergen Reactions    Dilaudid [hydromorphone (bulk)] Nausea And Vomiting     Severe GI upset    Lortab [hydrocodone-acetaminophen] Itching       No current facility-administered medications on file prior to encounter.      Current Outpatient Prescriptions on File Prior to Encounter   Medication Sig    acetaminophen (TYLENOL) 325 MG tablet Take 325 mg by mouth every 6 (six) hours as needed for Pain (headache).    ascorbic acid, vitamin C, (VITAMIN C) 500 MG tablet Take 1 tablet (500 mg total) by mouth every  evening.    aspirin (ECOTRIN) 81 MG EC tablet Take 1 tablet (81 mg total) by mouth once daily.    atorvastatin (LIPITOR) 80 MG tablet Take 1 tablet (80 mg total) by mouth once daily.    fenofibrate 160 MG Tab Take 1 tablet (160 mg total) by mouth once daily.    ferrous sulfate 325 (65 FE) MG EC tablet Take 1 tablet (325 mg total) by mouth 2 (two) times daily with meals.    gabapentin (NEURONTIN) 300 MG capsule Take 1 capsule (300 mg total) by mouth every evening.    glipiZIDE (GLUCOTROL) 5 MG TR24 Take 1 tablet (5 mg total) by mouth 2 times daily 2 hours after meal.    metoprolol tartrate (LOPRESSOR) 50 MG tablet Take 1 tablet (50 mg total) by mouth once daily.    montelukast (SINGULAIR) 10 mg tablet Take 1 tablet (10 mg total) by mouth every evening.    albuterol 90 mcg/actuation inhaler Inhale 2 puffs into the lungs 4 (four) times daily. (Patient taking differently: Inhale 2 puffs into the lungs every 4 (four) hours as needed. )    blood sugar diagnostic (ACCU-CHEK KAYLIE PLUS TEST STRP) Strp 1 strip by Misc.(Non-Drug; Combo Route) route 3 (three) times daily.    furosemide (LASIX) 20 MG tablet Take 1 tablet (20 mg total) by mouth every 72 hours. (Patient taking differently: Take 20 mg by mouth once daily. )    lisinopril 10 MG tablet Take 1 tablet (10 mg total) by mouth once daily.    NIFEdipine (PROCARDIA-XL) 90 MG (OSM) 24 hr tablet Take 1 tablet (90 mg total) by mouth once daily.    [DISCONTINUED] clorazepate (TRANXENE) 3.75 MG Tab Take 7.5 mg by mouth nightly as needed.      Family History     Problem Relation (Age of Onset)    Cancer Paternal Grandmother    Diabetes Father, Sister, Sister, Maternal Aunt, Maternal Grandmother    Heart disease Maternal Grandmother    Hyperlipidemia Mother    Hypertension Mother, Father        Social History Main Topics    Smoking status: Never Smoker    Smokeless tobacco: Never Used    Alcohol use No    Drug use: No    Sexual activity: Yes     Partners: Male      Review of Systems   Constitutional: Negative.    HENT: Negative.    Eyes: Negative.    Respiratory: Negative.    Cardiovascular: Negative.    Gastrointestinal: Negative.    Endocrine: Negative.    Genitourinary: Negative.    Musculoskeletal: Negative.    Skin: Negative.    Neurological: Positive for headaches. Negative for dizziness, tremors, seizures, syncope, facial asymmetry, speech difficulty, weakness, light-headedness and numbness.   Hematological: Negative.    Psychiatric/Behavioral: Negative.      Objective:     Vital Signs (Most Recent):  Temp: 98.2 °F (36.8 °C) (04/24/18 1248)  Pulse: 75 (04/24/18 1248)  Resp: 19 (04/24/18 1248)  BP: (!) 214/99 (04/24/18 1248)  SpO2: 97 % (04/24/18 1248) Vital Signs (24h Range):  Temp:  [98.2 °F (36.8 °C)-98.7 °F (37.1 °C)] 98.2 °F (36.8 °C)  Pulse:  [68-75] 75  Resp:  [14-19] 19  SpO2:  [97 %-98 %] 97 %  BP: (203-260)/() 214/99     Weight: 85.8 kg (189 lb 2.5 oz)  Body mass index is 27.93 kg/m².    Physical Exam   Constitutional: She is oriented to person, place, and time. She appears well-developed and well-nourished.   HENT:   Head: Normocephalic and atraumatic.   Right Ear: External ear normal.   Left Ear: External ear normal.   Nose: Nose normal.   Mouth/Throat: Oropharynx is clear and moist.   Eyes: Conjunctivae and EOM are normal. Pupils are equal, round, and reactive to light.   Neck: Normal range of motion. Neck supple.   Cardiovascular: Normal rate and regular rhythm.  Exam reveals no gallop and no friction rub.    Murmur heard.  Pulmonary/Chest: Effort normal and breath sounds normal.   Abdominal: Soft. Bowel sounds are normal.   Musculoskeletal: Normal range of motion.   Neurological: She is alert and oriented to person, place, and time.   Skin: Skin is warm and dry. Capillary refill takes less than 2 seconds.   Psychiatric: She has a normal mood and affect. Her behavior is normal. Judgment and thought content normal.         CRANIAL NERVES     CN II    Visual fields full to confrontation.     CN III, IV, VI   Pupils are equal, round, and reactive to light.  Extraocular motions are normal.     CN V   Facial sensation intact.     CN VII   Facial expression full, symmetric.     CN VIII   CN VIII normal.     CN IX, X   CN IX normal.     CN XI   CN XI normal.     CN XII   CN XII normal.        Significant Labs:   CBC:   Recent Labs  Lab 04/24/18  0949   WBC 9.00   HGB 11.4*   HCT 34.1*        CMP:   Recent Labs  Lab 04/24/18  0949      K 4.9      CO2 23   *   BUN 46*   CREATININE 3.1*   CALCIUM 9.8   PROT 7.5   ALBUMIN 3.2*   BILITOT 0.2   ALKPHOS 67   AST 19   ALT 16   ANIONGAP 9   EGFRNONAA 17*     Magnesium:   Recent Labs  Lab 04/24/18  0949   MG 1.9     None    Significant Imaging: I have reviewed and interpreted all pertinent imaging results/findings within the past 24 hours.    Assessment/Plan:     Hypertension    Cardene drip  Restart Home meds  Get ECHO and Carotid doppler          Hyperlipemia    Continue Rx          DM2 (diabetes mellitus, type 2)      Begin sliding scale insulin        GODWIN (acute kidney injury)      Consult Nephrology and hydrate          VTE Risk Mitigation         Ordered     enoxaparin injection 30 mg  Daily      04/24/18 1209     IP VTE HIGH RISK PATIENT  Once      04/24/18 1252     Place sequential compression device  Until discontinued      04/24/18 1252     Place sequential compression device  Until discontinued      04/24/18 1209        Critical care time spent on the evaluation and treatment of severe organ dysfunction, review of pertinent labs and imaging studies, discussions with consulting providers and discussions with patient/family: 45 minutes.     Cory Norris MD  Department of Hospital Medicine   [facility]

## 2018-04-25 PROBLEM — E11.9 DM2 (DIABETES MELLITUS, TYPE 2): Status: ACTIVE | Noted: 2018-04-25

## 2018-04-25 LAB
ANION GAP SERPL CALC-SCNC: 8 MMOL/L
BASOPHILS # BLD AUTO: 0.1 K/UL
BASOPHILS NFR BLD: 1.4 %
BUN SERPL-MCNC: 47 MG/DL
CALCIUM SERPL-MCNC: 8.8 MG/DL
CHLORIDE SERPL-SCNC: 109 MMOL/L
CO2 SERPL-SCNC: 22 MMOL/L
CREAT SERPL-MCNC: 3.4 MG/DL
DIFFERENTIAL METHOD: ABNORMAL
EOSINOPHIL # BLD AUTO: 0.3 K/UL
EOSINOPHIL NFR BLD: 3 %
ERYTHROCYTE [DISTWIDTH] IN BLOOD BY AUTOMATED COUNT: 15 %
EST. GFR  (AFRICAN AMERICAN): 17 ML/MIN/1.73 M^2
EST. GFR  (NON AFRICAN AMERICAN): 15 ML/MIN/1.73 M^2
GLUCOSE SERPL-MCNC: 260 MG/DL
HCT VFR BLD AUTO: 28.7 %
HGB BLD-MCNC: 9.5 G/DL
LYMPHOCYTES # BLD AUTO: 2.9 K/UL
LYMPHOCYTES NFR BLD: 30.7 %
MAGNESIUM SERPL-MCNC: 1.9 MG/DL
MCH RBC QN AUTO: 26.7 PG
MCHC RBC AUTO-ENTMCNC: 33.3 G/DL
MCV RBC AUTO: 80 FL
MONOCYTES # BLD AUTO: 0.6 K/UL
MONOCYTES NFR BLD: 6.9 %
NEUTROPHILS # BLD AUTO: 5.4 K/UL
NEUTROPHILS NFR BLD: 58 %
PHOSPHATE SERPL-MCNC: 4.6 MG/DL
PLATELET # BLD AUTO: 311 K/UL
PMV BLD AUTO: 8.4 FL
POCT GLUCOSE: 216 MG/DL (ref 70–110)
POCT GLUCOSE: 278 MG/DL (ref 70–110)
POCT GLUCOSE: 315 MG/DL (ref 70–110)
POCT GLUCOSE: 320 MG/DL (ref 70–110)
POTASSIUM SERPL-SCNC: 4.4 MMOL/L
RBC # BLD AUTO: 3.58 M/UL
SODIUM SERPL-SCNC: 139 MMOL/L
WBC # BLD AUTO: 9.4 K/UL

## 2018-04-25 PROCEDURE — 25000003 PHARM REV CODE 250: Performed by: EMERGENCY MEDICINE

## 2018-04-25 PROCEDURE — 36415 COLL VENOUS BLD VENIPUNCTURE: CPT

## 2018-04-25 PROCEDURE — 80048 BASIC METABOLIC PNL TOTAL CA: CPT

## 2018-04-25 PROCEDURE — 83735 ASSAY OF MAGNESIUM: CPT

## 2018-04-25 PROCEDURE — 12000002 HC ACUTE/MED SURGE SEMI-PRIVATE ROOM

## 2018-04-25 PROCEDURE — 85025 COMPLETE CBC W/AUTO DIFF WBC: CPT

## 2018-04-25 PROCEDURE — 63600175 PHARM REV CODE 636 W HCPCS: Performed by: INTERNAL MEDICINE

## 2018-04-25 PROCEDURE — 25000003 PHARM REV CODE 250: Performed by: INTERNAL MEDICINE

## 2018-04-25 PROCEDURE — 84100 ASSAY OF PHOSPHORUS: CPT

## 2018-04-25 RX ORDER — LISINOPRIL 10 MG/1
10 TABLET ORAL DAILY
Status: DISCONTINUED | OUTPATIENT
Start: 2018-04-25 | End: 2018-04-25

## 2018-04-25 RX ORDER — GABAPENTIN 300 MG/1
300 CAPSULE ORAL NIGHTLY
Status: DISCONTINUED | OUTPATIENT
Start: 2018-04-25 | End: 2018-04-28 | Stop reason: HOSPADM

## 2018-04-25 RX ORDER — HYDRALAZINE HYDROCHLORIDE 25 MG/1
25 TABLET, FILM COATED ORAL EVERY 8 HOURS
Status: DISCONTINUED | OUTPATIENT
Start: 2018-04-25 | End: 2018-04-27

## 2018-04-25 RX ORDER — METOPROLOL TARTRATE 50 MG/1
50 TABLET ORAL 2 TIMES DAILY
Status: DISCONTINUED | OUTPATIENT
Start: 2018-04-25 | End: 2018-04-27

## 2018-04-25 RX ORDER — ASPIRIN 81 MG/1
81 TABLET ORAL DAILY
Status: DISCONTINUED | OUTPATIENT
Start: 2018-04-25 | End: 2018-04-28 | Stop reason: HOSPADM

## 2018-04-25 RX ORDER — FERROUS SULFATE 325(65) MG
325 TABLET, DELAYED RELEASE (ENTERIC COATED) ORAL 2 TIMES DAILY
Status: DISCONTINUED | OUTPATIENT
Start: 2018-04-25 | End: 2018-04-28 | Stop reason: HOSPADM

## 2018-04-25 RX ORDER — NIFEDIPINE 30 MG/1
90 TABLET, EXTENDED RELEASE ORAL DAILY
Status: DISCONTINUED | OUTPATIENT
Start: 2018-04-25 | End: 2018-04-28 | Stop reason: HOSPADM

## 2018-04-25 RX ORDER — ATORVASTATIN CALCIUM 40 MG/1
80 TABLET, FILM COATED ORAL DAILY
Status: DISCONTINUED | OUTPATIENT
Start: 2018-04-25 | End: 2018-04-28 | Stop reason: HOSPADM

## 2018-04-25 RX ORDER — DOXAZOSIN 1 MG/1
2 TABLET ORAL 2 TIMES DAILY
Status: DISCONTINUED | OUTPATIENT
Start: 2018-04-25 | End: 2018-04-27

## 2018-04-25 RX ORDER — FUROSEMIDE 20 MG/1
20 TABLET ORAL
Status: DISCONTINUED | OUTPATIENT
Start: 2018-04-25 | End: 2018-04-25

## 2018-04-25 RX ADMIN — LISINOPRIL 10 MG: 10 TABLET ORAL at 09:04

## 2018-04-25 RX ADMIN — ENOXAPARIN SODIUM 30 MG: 100 INJECTION SUBCUTANEOUS at 04:04

## 2018-04-25 RX ADMIN — ATORVASTATIN CALCIUM 80 MG: 40 TABLET, FILM COATED ORAL at 09:04

## 2018-04-25 RX ADMIN — NICARDIPINE HYDROCHLORIDE 1.24 MG/HR: 0.2 INJECTION, SOLUTION INTRAVENOUS at 05:04

## 2018-04-25 RX ADMIN — HYDRALAZINE HYDROCHLORIDE 25 MG: 25 TABLET, FILM COATED ORAL at 09:04

## 2018-04-25 RX ADMIN — PANTOPRAZOLE SODIUM 40 MG: 40 TABLET, DELAYED RELEASE ORAL at 07:04

## 2018-04-25 RX ADMIN — INSULIN ASPART 8 UNITS: 100 INJECTION, SOLUTION INTRAVENOUS; SUBCUTANEOUS at 11:04

## 2018-04-25 RX ADMIN — HYDRALAZINE HYDROCHLORIDE 25 MG: 25 TABLET, FILM COATED ORAL at 10:04

## 2018-04-25 RX ADMIN — DOXAZOSIN 2 MG: 1 TABLET ORAL at 11:04

## 2018-04-25 RX ADMIN — GABAPENTIN 300 MG: 300 CAPSULE ORAL at 09:04

## 2018-04-25 RX ADMIN — INSULIN ASPART 6 UNITS: 100 INJECTION, SOLUTION INTRAVENOUS; SUBCUTANEOUS at 04:04

## 2018-04-25 RX ADMIN — METOPROLOL TARTRATE 50 MG: 50 TABLET ORAL at 09:04

## 2018-04-25 RX ADMIN — NIFEDIPINE 90 MG: 30 TABLET, FILM COATED, EXTENDED RELEASE ORAL at 09:04

## 2018-04-25 RX ADMIN — FUROSEMIDE 20 MG: 20 TABLET ORAL at 09:04

## 2018-04-25 RX ADMIN — DOXAZOSIN 2 MG: 1 TABLET ORAL at 09:04

## 2018-04-25 RX ADMIN — ASPIRIN 81 MG: 81 TABLET, COATED ORAL at 09:04

## 2018-04-25 RX ADMIN — FERROUS SULFATE TAB EC 325 MG (65 MG FE EQUIVALENT) 325 MG: 325 (65 FE) TABLET DELAYED RESPONSE at 10:04

## 2018-04-25 RX ADMIN — INSULIN ASPART 4 UNITS: 100 INJECTION, SOLUTION INTRAVENOUS; SUBCUTANEOUS at 06:04

## 2018-04-25 RX ADMIN — FERROUS SULFATE TAB EC 325 MG (65 MG FE EQUIVALENT) 325 MG: 325 (65 FE) TABLET DELAYED RESPONSE at 09:04

## 2018-04-25 RX ADMIN — INSULIN ASPART 4 UNITS: 100 INJECTION, SOLUTION INTRAVENOUS; SUBCUTANEOUS at 09:04

## 2018-04-25 NOTE — PLAN OF CARE
Problem: Patient Care Overview  Goal: Plan of Care Review  Outcome: Ongoing (interventions implemented as appropriate)  POC discussed with patient, verbalized understanding. BP monitored throughout shift with Cardene drip still in place at 1.24mg/hr. BG covered with prn insulin sliding scale. Ambulated to restroom x1 assist. Will monitor.

## 2018-04-25 NOTE — SUBJECTIVE & OBJECTIVE
Interval History:   BP improved.  HA resolved.  Off Cardene drip.  Cardura added by me.  Will move out of ICU when BP stable.    Review of Systems   Constitutional: Negative.    HENT: Negative.    Eyes: Negative.    Respiratory: Negative.    Cardiovascular: Negative.    Gastrointestinal: Negative.    Endocrine: Negative.    Genitourinary: Negative.    Musculoskeletal: Negative.    Skin: Negative.    Neurological: Negative.    Hematological: Negative.    Psychiatric/Behavioral: Negative.      Objective:     Vital Signs (Most Recent):  Temp: 98.4 °F (36.9 °C) (04/25/18 1200)  Pulse: 60 (04/25/18 1400)  Resp: 20 (04/25/18 1400)  BP: (!) 115/56 (04/25/18 1400)  SpO2: (!) 90 % (04/25/18 1400) Vital Signs (24h Range):  Temp:  [97.9 °F (36.6 °C)-98.5 °F (36.9 °C)] 98.4 °F (36.9 °C)  Pulse:  [56-75] 60  Resp:  [9-56] 20  SpO2:  [88 %-99 %] 90 %  BP: (115-253)/() 115/56     Weight: 85.7 kg (189 lb)  Body mass index is 27.91 kg/m².    Intake/Output Summary (Last 24 hours) at 04/25/18 1551  Last data filed at 04/25/18 1100   Gross per 24 hour   Intake           239.66 ml   Output             1000 ml   Net          -760.34 ml      Physical Exam   Constitutional: She is oriented to person, place, and time. She appears well-developed and well-nourished.   HENT:   Head: Normocephalic and atraumatic.   Eyes: Conjunctivae and EOM are normal. Pupils are equal, round, and reactive to light.   Neck: Normal range of motion. Neck supple.   Cardiovascular: Normal rate, regular rhythm, normal heart sounds and intact distal pulses.    Pulmonary/Chest: Effort normal and breath sounds normal.   Abdominal: Soft. Bowel sounds are normal.   Musculoskeletal: Normal range of motion.   Neurological: She is alert and oriented to person, place, and time.   Skin: Skin is warm and dry. Capillary refill takes less than 2 seconds.   Psychiatric: She has a normal mood and affect. Her behavior is normal. Judgment and thought content normal.   Nursing  note and vitals reviewed.      Significant Labs: All pertinent labs within the past 24 hours have been reviewed.    Significant Imaging: I have reviewed and interpreted all pertinent imaging results/findings within the past 24 hours.

## 2018-04-25 NOTE — NURSING
Updated Dr Norris on patient's status. MD reordered home medications. Remains off of Cardene gtt. SBP is labile 160s-190s. Will administer medications and monitor BP.

## 2018-04-25 NOTE — NURSING
Report called to PCU - Keshana. Patient transferred via w/c to room. Call light is with in reach. Safety maintained.

## 2018-04-25 NOTE — PLAN OF CARE
Problem: Patient Care Overview  Goal: Plan of Care Review  Outcome: Ongoing (interventions implemented as appropriate)  Plan of care reviewed. PO home medications restarted with the addition of Cardura and Hydralazine. -160s after all medication had been administered. CBG >200. Covered with moderate sliding scale. Will transfer to floor.

## 2018-04-25 NOTE — PROGRESS NOTES
INPATIENT NEPHROLOGY PROGRESS NOTE  [facility] NEPHROLOGY    Patient Name: Leanna García  Date: 04/25/2018    Reason for consultation: GODWIN    Chief Complaint:   Chief Complaint   Patient presents with    elevated BP       History of Present Illness:  53 y/o F with hx of HTN, HLD, AF, CVA, and DM who p/w elevated blood pressure this morning. She takes metoprolol, nifedipine, lisinopril and lasix at home. Reviewed old records- She is a patient of Dr. Carlson. Last seen in clinic on 3/22/18. Cr 2.4. /82. Lasix was changed to daily. She says her baseline SBP at home is 150. 2/2018 renal duplex neg for RUDDY. We have been consulted for GODWIN.     · Interval History/Subjective:    - BP is improved- taken off cardene gtt for about 45 minutes and then SBP rebounded- now 190. Patient is asymptomatic. Asked nurse to resume cardene gtt. I have ordered hydralazine 25mg TID to start now.   - hospitalist started home meds; patient was given lisinopril and lasix early this AM  - CXR clear  - carotid duplex, echo pending  - + trop leak,   - no cp, dyspnea, abd pain, n/v or edema  - UOP 500cc    · Review of Systems: All 14 systems reviewed and negative, except as noted in HPI    Plan of Care:    Assessment:  AoCKI, baseline stage IV CKD (Cr 2-2.5)  HTN urgency  SHPT  Anemia of CKD     Plan:     1. Acute on Chronic Kidney Injury- Suspect prerenal vs ischemic ATN in the setting of HTN urgency, supported by UA with hyaline and granular casts. Cr continues to inch up. She unfortunately got lisinopril and lasix this AM- would have held in setting of worsening renal function. Have dc'ed both meds for now. She is nonoliguric. There is no acute indication for RRT. Hopefully once BP is better controlled, she will stabilize. No NSAIDs or IV contrast.     2. Volume/Blood Pressure- Will resume cardene gtt. Agree with BB and CCB. Dc'ed ACE and diuretic. Ordered hydralazine 25mg TID. Aim for SBP < 160. She has a trop leak c/w demand  ischemia. Awaiting echo. BNP is elevated but she appears euvolemic on exam and CXR is clear- suspect bump is in part 2/2 GODWIN/CKD- echo should be helpful to evaluate RAP. Prior renal duplex was negative for RUDDY- cannot get CTA due to advanced CKD.      3. SHPT- Prior parameters from 12/2017 were at goal.      4. Anemia of CKD- Prior iron studies from 12/2017 were c/w chronic dx. + Hb drop- will trend. Resumed home dose of iron pills today.     Thank you for allowing us to participate in this patient's care. We will continue to follow.    Medications:  No current facility-administered medications on file prior to encounter.      Current Outpatient Prescriptions on File Prior to Encounter   Medication Sig Dispense Refill    acetaminophen (TYLENOL) 325 MG tablet Take 325 mg by mouth every 6 (six) hours as needed for Pain (headache).      ascorbic acid, vitamin C, (VITAMIN C) 500 MG tablet Take 1 tablet (500 mg total) by mouth every evening.      aspirin (ECOTRIN) 81 MG EC tablet Take 1 tablet (81 mg total) by mouth once daily.      atorvastatin (LIPITOR) 80 MG tablet Take 1 tablet (80 mg total) by mouth once daily. 90 tablet 3    fenofibrate 160 MG Tab Take 1 tablet (160 mg total) by mouth once daily. 90 tablet 3    ferrous sulfate 325 (65 FE) MG EC tablet Take 1 tablet (325 mg total) by mouth 2 (two) times daily with meals.  0    gabapentin (NEURONTIN) 300 MG capsule Take 1 capsule (300 mg total) by mouth every evening. 90 capsule 3    glipiZIDE (GLUCOTROL) 5 MG TR24 Take 1 tablet (5 mg total) by mouth 2 times daily 2 hours after meal. 180 tablet 3    metoprolol tartrate (LOPRESSOR) 50 MG tablet Take 1 tablet (50 mg total) by mouth once daily. 180 tablet 1    montelukast (SINGULAIR) 10 mg tablet Take 1 tablet (10 mg total) by mouth every evening. 90 tablet 1    albuterol 90 mcg/actuation inhaler Inhale 2 puffs into the lungs 4 (four) times daily. (Patient taking differently: Inhale 2 puffs into the lungs  every 4 (four) hours as needed. ) 1 Inhaler 1    blood sugar diagnostic (ACCU-CHEK KAYLIE PLUS TEST STRP) Strp 1 strip by Misc.(Non-Drug; Combo Route) route 3 (three) times daily. 300 strip 3    furosemide (LASIX) 20 MG tablet Take 1 tablet (20 mg total) by mouth every 72 hours. (Patient taking differently: Take 20 mg by mouth once daily. )  0    lisinopril 10 MG tablet Take 1 tablet (10 mg total) by mouth once daily. 30 tablet 2    NIFEdipine (PROCARDIA-XL) 90 MG (OSM) 24 hr tablet Take 1 tablet (90 mg total) by mouth once daily. 90 tablet 1    [DISCONTINUED] clorazepate (TRANXENE) 3.75 MG Tab Take 7.5 mg by mouth nightly as needed.        Scheduled Meds:   aspirin  81 mg Oral Daily    atorvastatin  80 mg Oral Daily    enoxaparin  30 mg Subcutaneous Daily    furosemide  20 mg Oral Q3 Days    gabapentin  300 mg Oral QHS    lisinopril  10 mg Oral Daily    metoprolol tartrate  50 mg Oral BID    NIFEdipine  90 mg Oral Daily    pantoprazole  40 mg Oral Daily     Continuous Infusions:   nicardipine Stopped (04/25/18 0603)     PRN Meds:.acetaminophen-codeine 300-30mg, dextrose 50%, dextrose 50%, glucagon (human recombinant), glucose, glucose, insulin aspart U-100, magnesium sulfate IVPB, ondansetron, promethazine (PHENERGAN) IVPB, sodium chloride 0.9%, zolpidem    Allergies:  Dilaudid [hydromorphone (bulk)] and Lortab [hydrocodone-acetaminophen]    Vital Signs:  Temp Readings from Last 3 Encounters:   04/25/18 97.9 °F (36.6 °C) (Oral)   02/11/18 97.9 °F (36.6 °C) (Oral)   01/18/18 98.2 °F (36.8 °C) (Oral)       Pulse Readings from Last 3 Encounters:   04/25/18 69   04/11/18 60   03/23/18 (!) 59       BP Readings from Last 3 Encounters:   04/25/18 (!) 177/77   04/11/18 (!) 147/75   03/23/18 (!) 168/78       Weight:  Wt Readings from Last 3 Encounters:   04/24/18 85.8 kg (189 lb 2.5 oz)   04/11/18 93.7 kg (206 lb 9.6 oz)   03/23/18 94.2 kg (207 lb 10.8 oz)       INS/OUTS:  I/O last 3 completed shifts:  In:  239.7 [I.V.:239.7]  Out: 500 [Urine:500]  No intake/output data recorded.    Physical Exam:  Constitutional: nad, aao x 3  Heart: rrr, no m/r/g, wwp, no edema  Lungs: ctab, no w/r/r/c, no lb  Abdomen: s/nt/nd, +BS    Results:  Lab Results   Component Value Date     04/25/2018    K 4.4 04/25/2018     04/25/2018    CO2 22 (L) 04/25/2018    BUN 47 (H) 04/25/2018    CREATININE 3.4 (H) 04/25/2018    CALCIUM 8.8 04/25/2018    ANIONGAP 8 04/25/2018    ESTGFRAFRICA 17 (A) 04/25/2018    EGFRNONAA 15 (A) 04/25/2018       Lab Results   Component Value Date    CALCIUM 8.8 04/25/2018    PHOS 4.6 (H) 04/25/2018         Recent Labs  Lab 04/25/18  0338   WBC 9.40   RBC 3.58*   HGB 9.5*   HCT 28.7*      MCV 80*   MCH 26.7*   MCHC 33.3       I have personally reviewed pertinent radiological imaging and reports.    Ashleigh Soria MD MPH  [facility]  [address] [place] [id number]  [phone] (p)  [phone] (f)

## 2018-04-25 NOTE — PROGRESS NOTES
[facility] Hospital Medicine  Progress Note    Patient Name: Leanna García  MRN: [id number]  Patient Class: IP- Inpatient   Admission Date: 4/24/2018  Length of Stay: 1 days  Attending Physician: Cory Norris MD  Primary Care Provider: Luis Hassan MD        Subjective:     Principal Problem:<principal problem not specified>    HPI:  Present to the ER with elevated BP. Noted today when she developed HA after crawfish boil. SBP in . No neurologic findings. Has h/o CKD with Cret of about 2.0 which is now ~3.0. Her past hx is significant for DM 2, and prior TIA. She is being admitted for BP control and IV hydration.    Hospital Course:  Patient admitted to the ICU and started on Cardene drip with improvement. She was changed to her home meds and seen by Nephrology with some additional changes in Meds.    Interval History:   BP improved.  HA resolved.  Off Cardene drip.  Cardura added by me.  Will move out of ICU when BP stable.    Review of Systems   Constitutional: Negative.    HENT: Negative.    Eyes: Negative.    Respiratory: Negative.    Cardiovascular: Negative.    Gastrointestinal: Negative.    Endocrine: Negative.    Genitourinary: Negative.    Musculoskeletal: Negative.    Skin: Negative.    Neurological: Negative.    Hematological: Negative.    Psychiatric/Behavioral: Negative.      Objective:     Vital Signs (Most Recent):  Temp: 98.4 °F (36.9 °C) (04/25/18 1200)  Pulse: 60 (04/25/18 1400)  Resp: 20 (04/25/18 1400)  BP: (!) 115/56 (04/25/18 1400)  SpO2: (!) 90 % (04/25/18 1400) Vital Signs (24h Range):  Temp:  [97.9 °F (36.6 °C)-98.5 °F (36.9 °C)] 98.4 °F (36.9 °C)  Pulse:  [56-75] 60  Resp:  [9-56] 20  SpO2:  [88 %-99 %] 90 %  BP: (115-253)/() 115/56     Weight: 85.7 kg (189 lb)  Body mass index is 27.91 kg/m².    Intake/Output Summary (Last 24 hours) at 04/25/18 1551  Last data filed at 04/25/18 1100   Gross per 24 hour   Intake           239.66 ml   Output              1000 ml   Net          -760.34 ml      Physical Exam   Constitutional: She is oriented to person, place, and time. She appears well-developed and well-nourished.   HENT:   Head: Normocephalic and atraumatic.   Eyes: Conjunctivae and EOM are normal. Pupils are equal, round, and reactive to light.   Neck: Normal range of motion. Neck supple.   Cardiovascular: Normal rate, regular rhythm, normal heart sounds and intact distal pulses.    Pulmonary/Chest: Effort normal and breath sounds normal.   Abdominal: Soft. Bowel sounds are normal.   Musculoskeletal: Normal range of motion.   Neurological: She is alert and oriented to person, place, and time.   Skin: Skin is warm and dry. Capillary refill takes less than 2 seconds.   Psychiatric: She has a normal mood and affect. Her behavior is normal. Judgment and thought content normal.   Nursing note and vitals reviewed.      Significant Labs: All pertinent labs within the past 24 hours have been reviewed.    Significant Imaging: I have reviewed and interpreted all pertinent imaging results/findings within the past 24 hours.    Assessment/Plan:      Hypertension    Cardene drip off  Restart Home meds  F/U ECHO and Carotid doppler        Hyperlipemia    Continue Rx          DM2 (diabetes mellitus, type 2)    Begin sliding scale insulin.  Discussed better control.        GODWIN (acute kidney injury)    Management as per Nephrology.        Chronic kidney disease, stage IV (severe)    Nephrology has seen and written orders.          VTE Risk Mitigation         Ordered     enoxaparin injection 30 mg  Daily      04/24/18 1209     IP VTE HIGH RISK PATIENT  Once      04/24/18 1252     Place sequential compression device  Until discontinued      04/24/18 1252     Place sequential compression device  Until discontinued      04/24/18 1209          Critical care time spent on the evaluation and treatment of severe organ dysfunction, review of pertinent labs and imaging studies,  discussions with consulting providers and discussions with patient/family: 45 minutes.    Cory Norris MD  Department of Hospital Medicine   [facility]

## 2018-04-25 NOTE — HOSPITAL COURSE
Patient admitted to the ICU and started on Cardene drip with improvement. She was changed to her home meds and seen by Nephrology with some additional changes in Meds.  4/26/18:  BP with good control.  Renal function worse  Started fluid challenge  Discussed with Nephrology.  Blood pressure medicines were optimized during the patient's hospital course with improvements in blood pressure to prehypertensive range.  It was deemed appropriate that patient could be discharged to follow-up with her primary care provider for further management of this condition.  Patient examined by myself on discharge, unlabored breathing, negative cardiac exam.

## 2018-04-26 LAB
ANION GAP SERPL CALC-SCNC: 6 MMOL/L
AORTIC ROOT ANNULUS: 2.39 CM
AORTIC VALVE CUSP SEPERATION: 1.87 CM
AV MEAN GRADIENT: 4.94 MMHG
AV VALVE AREA: 3.1 CM2
BASOPHILS # BLD AUTO: 0.1 K/UL
BASOPHILS NFR BLD: 0.7 %
BSA FOR ECHO PROCEDURE: 2.04 M2
BUN SERPL-MCNC: 54 MG/DL
CALCIUM SERPL-MCNC: 8.7 MG/DL
CHLORIDE SERPL-SCNC: 110 MMOL/L
CO2 SERPL-SCNC: 24 MMOL/L
CREAT SERPL-MCNC: 4.2 MG/DL
CV ECHO LV RWT: 0.56 CM
DIFFERENTIAL METHOD: ABNORMAL
DOP CALC AO PEAK VEL: 1.55 M/S
DOP CALC AO VTI: 0.31 CM
DOP CALC LVOT AREA: 3.43 CM2
DOP CALC LVOT DIAMETER: 2.09 CM
DOP CALC LVOT STROKE VOLUME: 0.96 CM3
DOP CALCLVOT PEAK VEL VTI: 0.28 CM
E WAVE DECELERATION TIME: 305.65 MSEC
E/A RATIO: 0.7
E/E' RATIO: 11.33
ECHO EF ESTIMATED: 70 %
ECHO LV POSTERIOR WALL: 1.17 CM (ref 0.6–1.1)
EOSINOPHIL # BLD AUTO: 0.2 K/UL
EOSINOPHIL NFR BLD: 2.5 %
ERYTHROCYTE [DISTWIDTH] IN BLOOD BY AUTOMATED COUNT: 15 %
EST. GFR  (AFRICAN AMERICAN): 13 ML/MIN/1.73 M^2
EST. GFR  (NON AFRICAN AMERICAN): 11 ML/MIN/1.73 M^2
FRACTIONAL SHORTENING: 39 % (ref 28–44)
GLUCOSE SERPL-MCNC: 216 MG/DL
HCT VFR BLD AUTO: 27.6 %
HGB BLD-MCNC: 9.2 G/DL
INTERVENTRICULAR SEPTUM: 1.38 CM (ref 0.6–1.1)
IVRT: 0.11 MSEC
LEFT ATRIUM SIZE: 3.75 CM
LEFT INTERNAL DIMENSION IN SYSTOLE: 2.75 CM (ref 2.1–4)
LEFT VENTRICULAR INTERNAL DIMENSION IN DIASTOLE: 4.53 CM (ref 3.5–6)
LV LATERAL E/E' RATIO: 9.71
LV SEPTAL E/E' RATIO: 13.6
LYMPHOCYTES # BLD AUTO: 2.9 K/UL
LYMPHOCYTES NFR BLD: 29.6 %
MAGNESIUM SERPL-MCNC: 1.9 MG/DL
MCH RBC QN AUTO: 26.7 PG
MCHC RBC AUTO-ENTMCNC: 33.3 G/DL
MCV RBC AUTO: 80 FL
MONOCYTES # BLD AUTO: 0.8 K/UL
MONOCYTES NFR BLD: 8.4 %
MV PEAK A VEL: 0.97 M/S
MV PEAK E VEL: 0.68 M/S
MV STENOSIS PRESSURE HALF TIME: 88.64 MS
MV VALVE AREA P 1/2 METHOD: 2.48 CM2
NEUTROPHILS # BLD AUTO: 5.8 K/UL
NEUTROPHILS NFR BLD: 58.8 %
PHOSPHATE SERPL-MCNC: 4.8 MG/DL
PISA TR MAX VEL: 2.26 M/S
PLATELET # BLD AUTO: 293 K/UL
PMV BLD AUTO: 8.5 FL
POCT GLUCOSE: 199 MG/DL (ref 70–110)
POCT GLUCOSE: 252 MG/DL (ref 70–110)
POCT GLUCOSE: 257 MG/DL (ref 70–110)
POCT GLUCOSE: 281 MG/DL (ref 70–110)
POTASSIUM SERPL-SCNC: 5 MMOL/L
PULM VEIN A" WAVE DURATION": 60 MSEC
PV PEAK GRADIENT: 3.22 MMHG
PV PEAK VELOCITY: 0.9 CM/S
RBC # BLD AUTO: 3.44 M/UL
RIGHT VENTRICULAR END-DIASTOLIC DIMENSION: 3.03 CM
SODIUM SERPL-SCNC: 140 MMOL/L
TDI LATERAL: 0.07
TDI SEPTAL: 0.05
TDI: 0.06
TR MAX PG: 20.45 MMHG
WBC # BLD AUTO: 9.9 K/UL

## 2018-04-26 PROCEDURE — 80048 BASIC METABOLIC PNL TOTAL CA: CPT

## 2018-04-26 PROCEDURE — 25000003 PHARM REV CODE 250: Performed by: INTERNAL MEDICINE

## 2018-04-26 PROCEDURE — 83735 ASSAY OF MAGNESIUM: CPT

## 2018-04-26 PROCEDURE — 12000002 HC ACUTE/MED SURGE SEMI-PRIVATE ROOM

## 2018-04-26 PROCEDURE — 97802 MEDICAL NUTRITION INDIV IN: CPT

## 2018-04-26 PROCEDURE — 85025 COMPLETE CBC W/AUTO DIFF WBC: CPT

## 2018-04-26 PROCEDURE — 63600175 PHARM REV CODE 636 W HCPCS: Performed by: INTERNAL MEDICINE

## 2018-04-26 PROCEDURE — 36415 COLL VENOUS BLD VENIPUNCTURE: CPT

## 2018-04-26 PROCEDURE — 84100 ASSAY OF PHOSPHORUS: CPT

## 2018-04-26 RX ORDER — SODIUM CHLORIDE 9 MG/ML
INJECTION, SOLUTION INTRAVENOUS CONTINUOUS
Status: ACTIVE | OUTPATIENT
Start: 2018-04-26 | End: 2018-04-28

## 2018-04-26 RX ADMIN — FERROUS SULFATE TAB EC 325 MG (65 MG FE EQUIVALENT) 325 MG: 325 (65 FE) TABLET DELAYED RESPONSE at 08:04

## 2018-04-26 RX ADMIN — HYDRALAZINE HYDROCHLORIDE 25 MG: 25 TABLET, FILM COATED ORAL at 05:04

## 2018-04-26 RX ADMIN — DOXAZOSIN 2 MG: 1 TABLET ORAL at 08:04

## 2018-04-26 RX ADMIN — INSULIN ASPART 2 UNITS: 100 INJECTION, SOLUTION INTRAVENOUS; SUBCUTANEOUS at 05:04

## 2018-04-26 RX ADMIN — HYDRALAZINE HYDROCHLORIDE 25 MG: 25 TABLET, FILM COATED ORAL at 09:04

## 2018-04-26 RX ADMIN — METOPROLOL TARTRATE 50 MG: 50 TABLET ORAL at 08:04

## 2018-04-26 RX ADMIN — ATORVASTATIN CALCIUM 80 MG: 40 TABLET, FILM COATED ORAL at 08:04

## 2018-04-26 RX ADMIN — NIFEDIPINE 90 MG: 30 TABLET, FILM COATED, EXTENDED RELEASE ORAL at 08:04

## 2018-04-26 RX ADMIN — ASPIRIN 81 MG: 81 TABLET, COATED ORAL at 08:04

## 2018-04-26 RX ADMIN — METOPROLOL TARTRATE 50 MG: 50 TABLET ORAL at 09:04

## 2018-04-26 RX ADMIN — INSULIN ASPART 6 UNITS: 100 INJECTION, SOLUTION INTRAVENOUS; SUBCUTANEOUS at 11:04

## 2018-04-26 RX ADMIN — ACETAMINOPHEN AND CODEINE PHOSPHATE 1 TABLET: 300; 30 TABLET ORAL at 05:04

## 2018-04-26 RX ADMIN — PANTOPRAZOLE SODIUM 40 MG: 40 TABLET, DELAYED RELEASE ORAL at 08:04

## 2018-04-26 RX ADMIN — SODIUM CHLORIDE: 0.9 INJECTION, SOLUTION INTRAVENOUS at 11:04

## 2018-04-26 RX ADMIN — INSULIN ASPART 3 UNITS: 100 INJECTION, SOLUTION INTRAVENOUS; SUBCUTANEOUS at 09:04

## 2018-04-26 RX ADMIN — HYDRALAZINE HYDROCHLORIDE 25 MG: 25 TABLET, FILM COATED ORAL at 02:04

## 2018-04-26 RX ADMIN — GABAPENTIN 300 MG: 300 CAPSULE ORAL at 09:04

## 2018-04-26 RX ADMIN — DOXAZOSIN 2 MG: 1 TABLET ORAL at 09:04

## 2018-04-26 RX ADMIN — ENOXAPARIN SODIUM 30 MG: 100 INJECTION SUBCUTANEOUS at 04:04

## 2018-04-26 RX ADMIN — FERROUS SULFATE TAB EC 325 MG (65 MG FE EQUIVALENT) 325 MG: 325 (65 FE) TABLET DELAYED RESPONSE at 09:04

## 2018-04-26 RX ADMIN — INSULIN ASPART 6 UNITS: 100 INJECTION, SOLUTION INTRAVENOUS; SUBCUTANEOUS at 04:04

## 2018-04-26 NOTE — PROGRESS NOTES
[facility] Hospital Medicine  Progress Note    Patient Name: Leanna García  MRN: [id number]  Patient Class: IP- Inpatient   Admission Date: 4/24/2018  Length of Stay: 2 days  Attending Physician: Cory Norris MD  Primary Care Provider: Luis Hassan MD        Subjective:     Principal Problem:Hypertension    HPI:  Present to the ER with elevated BP. Noted today when she developed HA after crawfish boil. SBP in . No neurologic findings. Has h/o CKD with Cret of about 2.0 which is now ~3.0. Her past hx is significant for DM 2, and prior TIA. She is being admitted for BP control and IV hydration.    Hospital Course:  Patient admitted to the ICU and started on Cardene drip with improvement. She was changed to her home meds and seen by Nephrology with some additional changes in Meds.  4/26/18:  BP with good control.  Renal function worse  Started fluid challenge  Discussed with Nephrology.    Interval History:   BP with good control.  Renal function worse  Started fluid challenge  Discussed with Nephrology.    Review of Systems   Constitutional: Negative.    HENT: Negative.    Eyes: Negative.    Respiratory: Negative.    Cardiovascular: Negative.    Gastrointestinal: Negative.    Endocrine: Negative.    Genitourinary: Negative.    Musculoskeletal: Negative.    Skin: Negative.    Neurological: Negative.    Hematological: Negative.    Psychiatric/Behavioral: Negative.      Objective:     Vital Signs (Most Recent):  Temp: 97.9 °F (36.6 °C) (04/26/18 1046)  Pulse: (!) 59 (04/26/18 1046)  Resp: 18 (04/26/18 1046)  BP: 136/63 (04/26/18 1046)  SpO2: (!) 93 % (04/26/18 1046) Vital Signs (24h Range):  Temp:  [96.6 °F (35.9 °C)-98.7 °F (37.1 °C)] 97.9 °F (36.6 °C)  Pulse:  [56-71] 59  Resp:  [18-55] 18  SpO2:  [90 %-95 %] 93 %  BP: (114-177)/(56-85) 136/63     Weight: 85.7 kg (189 lb)  Body mass index is 27.91 kg/m².    Intake/Output Summary (Last 24 hours) at 04/26/18 1341  Last data filed at  04/26/18 0500   Gross per 24 hour   Intake              240 ml   Output                0 ml   Net              240 ml      Physical Exam   Constitutional: She is oriented to person, place, and time. She appears well-developed and well-nourished.   HENT:   Head: Normocephalic and atraumatic.   Eyes: Conjunctivae and EOM are normal. Pupils are equal, round, and reactive to light.   Neck: Normal range of motion. Neck supple.   Cardiovascular: Normal rate, regular rhythm, normal heart sounds and intact distal pulses.    Pulmonary/Chest: Effort normal and breath sounds normal.   Abdominal: Soft. Bowel sounds are normal.   Musculoskeletal: Normal range of motion.   Neurological: She is alert and oriented to person, place, and time.   Skin: Skin is warm and dry. Capillary refill takes less than 2 seconds.   Psychiatric: She has a normal mood and affect. Her behavior is normal. Judgment and thought content normal.   Nursing note and vitals reviewed.      Significant Labs: All pertinent labs within the past 24 hours have been reviewed.    Significant Imaging: I have reviewed and interpreted all pertinent imaging results/findings within the past 24 hours.    Assessment/Plan:      * Hypertension    Cardene drip off  Restart Home meds  F/U ECHO and Carotid doppler        Hyperlipemia    Continue Rx          DM2 (diabetes mellitus, type 2)    Begin sliding scale insulin.  Discussed better control.        GODWIN (acute kidney injury)    Management as per Nephrology.        Chronic kidney disease, stage IV (severe)    Nephrology has seen and written orders.          VTE Risk Mitigation         Ordered     enoxaparin injection 30 mg  Daily      04/24/18 1209     IP VTE HIGH RISK PATIENT  Once      04/24/18 1252     Place sequential compression device  Until discontinued      04/24/18 1252              Cory Norris MD  Department of Hospital Medicine   [facility]

## 2018-04-26 NOTE — SUBJECTIVE & OBJECTIVE
Interval History:   BP with good control.  Renal function worse  Started fluid challenge  Discussed with Nephrology.    Review of Systems   Constitutional: Negative.    HENT: Negative.    Eyes: Negative.    Respiratory: Negative.    Cardiovascular: Negative.    Gastrointestinal: Negative.    Endocrine: Negative.    Genitourinary: Negative.    Musculoskeletal: Negative.    Skin: Negative.    Neurological: Negative.    Hematological: Negative.    Psychiatric/Behavioral: Negative.      Objective:     Vital Signs (Most Recent):  Temp: 97.9 °F (36.6 °C) (04/26/18 1046)  Pulse: (!) 59 (04/26/18 1046)  Resp: 18 (04/26/18 1046)  BP: 136/63 (04/26/18 1046)  SpO2: (!) 93 % (04/26/18 1046) Vital Signs (24h Range):  Temp:  [96.6 °F (35.9 °C)-98.7 °F (37.1 °C)] 97.9 °F (36.6 °C)  Pulse:  [56-71] 59  Resp:  [18-55] 18  SpO2:  [90 %-95 %] 93 %  BP: (114-177)/(56-85) 136/63     Weight: 85.7 kg (189 lb)  Body mass index is 27.91 kg/m².    Intake/Output Summary (Last 24 hours) at 04/26/18 1341  Last data filed at 04/26/18 0500   Gross per 24 hour   Intake              240 ml   Output                0 ml   Net              240 ml      Physical Exam   Constitutional: She is oriented to person, place, and time. She appears well-developed and well-nourished.   HENT:   Head: Normocephalic and atraumatic.   Eyes: Conjunctivae and EOM are normal. Pupils are equal, round, and reactive to light.   Neck: Normal range of motion. Neck supple.   Cardiovascular: Normal rate, regular rhythm, normal heart sounds and intact distal pulses.    Pulmonary/Chest: Effort normal and breath sounds normal.   Abdominal: Soft. Bowel sounds are normal.   Musculoskeletal: Normal range of motion.   Neurological: She is alert and oriented to person, place, and time.   Skin: Skin is warm and dry. Capillary refill takes less than 2 seconds.   Psychiatric: She has a normal mood and affect. Her behavior is normal. Judgment and thought content normal.   Nursing note and  vitals reviewed.      Significant Labs: All pertinent labs within the past 24 hours have been reviewed.    Significant Imaging: I have reviewed and interpreted all pertinent imaging results/findings within the past 24 hours.

## 2018-04-26 NOTE — PROGRESS NOTES
INPATIENT NEPHROLOGY PROGRESS NOTE  [facility] NEPHROLOGY    Patient Name: Leanna García  Date: 04/26/2018    Reason for consultation:  GODWIN    Chief Complaint:   Chief Complaint   Patient presents with    elevated BP       History of Present Illness:  51 y/o F with hx of HTN, HLD, AF, CVA, and DM who p/w elevated blood pressure this morning. She takes metoprolol, nifedipine, lisinopril and lasix at home. Reviewed old records- She is a patient of Dr. Carlson. Last seen in clinic on 3/22/18. Cr 2.4. /82. Lasix was changed to daily. She says her baseline SBP at home is 150. 2/2018 renal duplex neg for RUDDY. We have been consulted for GODWIN.     · Interval History/Subjective:    - BP improved (SBP M< 150), on RA, UOP 500cc  - she did have episodes of low BP yesterday- -115 which is much lower than her baseline of 150  - no cp, dyspnea, abd pain, or edema    · Review of Systems: All 14 systems reviewed and negative, except as noted in HPI    Plan of Care:    Assessment:  Vivi, baseline stage IV CKD (Cr 2-2.5)  HTN urgency  SHPT  Anemia of CKD     Plan:     1. Acute on Chronic Kidney Injury- Suspect ischemic ATN in the setting of labile BP, supported by UA with hyaline and granular casts. Cr continues to climb. Maintain -150 (she isn't used to SBP < 140 at baseline). Ok with fluid challenge- cut back NS to 75cc/hr (doesn't need 125cc/hr). Keep lisinopril and lasix on hold (she did get 1 dose of each this admission). She is nonoliguric. Will monitor UOP very closely. There is no acute indication for RRT- no uremic symptoms, volume overload or metabolic derangements. Will reassess daily. No NSAIDs or IV contrast. At this point, see no indication for renal imaging- if renal function gets worse tomorrow or if UOP drops off, will obtain ultrasound tomorrow.      2. Volume/Blood Pressure- See BP discussion above. Hold off further medication adjustments (if BP drops below goal, can cut back doxazosin to 1mg  BID). Keep ACE and diuretic on hold. Added a 2g salt, low potassium, and 1.5L fluid restricted diet.       3. SHPT- Prior parameters from 12/2017 were at goal.      4. Anemia of CKD- Prior iron studies from 12/2017 were c/w chronic dx. Hb 9s, stable. Continue iron pills. She may need procrit prior to discharge.      Thank you for allowing us to participate in this patient's care. We will continue to follow.    Medications:  No current facility-administered medications on file prior to encounter.      Current Outpatient Prescriptions on File Prior to Encounter   Medication Sig Dispense Refill    acetaminophen (TYLENOL) 325 MG tablet Take 325 mg by mouth every 6 (six) hours as needed for Pain (headache).      ascorbic acid, vitamin C, (VITAMIN C) 500 MG tablet Take 1 tablet (500 mg total) by mouth every evening.      aspirin (ECOTRIN) 81 MG EC tablet Take 1 tablet (81 mg total) by mouth once daily.      atorvastatin (LIPITOR) 80 MG tablet Take 1 tablet (80 mg total) by mouth once daily. 90 tablet 3    fenofibrate 160 MG Tab Take 1 tablet (160 mg total) by mouth once daily. 90 tablet 3    ferrous sulfate 325 (65 FE) MG EC tablet Take 1 tablet (325 mg total) by mouth 2 (two) times daily with meals.  0    gabapentin (NEURONTIN) 300 MG capsule Take 1 capsule (300 mg total) by mouth every evening. 90 capsule 3    glipiZIDE (GLUCOTROL) 5 MG TR24 Take 1 tablet (5 mg total) by mouth 2 times daily 2 hours after meal. 180 tablet 3    metoprolol tartrate (LOPRESSOR) 50 MG tablet Take 1 tablet (50 mg total) by mouth once daily. 180 tablet 1    montelukast (SINGULAIR) 10 mg tablet Take 1 tablet (10 mg total) by mouth every evening. 90 tablet 1    albuterol 90 mcg/actuation inhaler Inhale 2 puffs into the lungs 4 (four) times daily. (Patient taking differently: Inhale 2 puffs into the lungs every 4 (four) hours as needed. ) 1 Inhaler 1    blood sugar diagnostic (ACCU-CHEK KAYLIE PLUS TEST STRP) Strp 1 strip by  Misc.(Non-Drug; Combo Route) route 3 (three) times daily. 300 strip 3    furosemide (LASIX) 20 MG tablet Take 1 tablet (20 mg total) by mouth every 72 hours. (Patient taking differently: Take 20 mg by mouth once daily. )  0    lisinopril 10 MG tablet Take 1 tablet (10 mg total) by mouth once daily. 30 tablet 2    NIFEdipine (PROCARDIA-XL) 90 MG (OSM) 24 hr tablet Take 1 tablet (90 mg total) by mouth once daily. 90 tablet 1    [DISCONTINUED] clorazepate (TRANXENE) 3.75 MG Tab Take 7.5 mg by mouth nightly as needed.        Scheduled Meds:   aspirin  81 mg Oral Daily    atorvastatin  80 mg Oral Daily    doxazosin  2 mg Oral BID    enoxaparin  30 mg Subcutaneous Daily    ferrous sulfate  325 mg Oral BID    gabapentin  300 mg Oral QHS    hydrALAZINE  25 mg Oral Q8H    metoprolol tartrate  50 mg Oral BID    NIFEdipine  90 mg Oral Daily    pantoprazole  40 mg Oral Daily     Continuous Infusions:   sodium chloride 0.9% 125 mL/hr at 04/26/18 1145     PRN Meds:.acetaminophen-codeine 300-30mg, dextrose 50%, dextrose 50%, glucagon (human recombinant), glucose, glucose, insulin aspart U-100, ondansetron, promethazine (PHENERGAN) IVPB, sodium chloride 0.9%, zolpidem    Allergies:  Dilaudid [hydromorphone (bulk)] and Lortab [hydrocodone-acetaminophen]    Vital Signs:  Temp Readings from Last 3 Encounters:   04/26/18 97.9 °F (36.6 °C) (Oral)   02/11/18 97.9 °F (36.6 °C) (Oral)   01/18/18 98.2 °F (36.8 °C) (Oral)       Pulse Readings from Last 3 Encounters:   04/26/18 (!) 59   04/11/18 60   03/23/18 (!) 59       BP Readings from Last 3 Encounters:   04/26/18 136/63   04/11/18 (!) 147/75   03/23/18 (!) 168/78       Weight:  Wt Readings from Last 3 Encounters:   04/25/18 85.7 kg (189 lb)   04/11/18 93.7 kg (206 lb 9.6 oz)   03/23/18 94.2 kg (207 lb 10.8 oz)       INS/OUTS:  I/O last 3 completed shifts:  In: 479.7 [P.O.:240; I.V.:239.7]  Out: 1000 [Urine:1000]  No intake/output data recorded.    Physical  Exam:  Constitutional: nad, aao x 3  Heart: rrr, no m/r/g, wwp, no edema  Lungs: dry crackles at the bases, no w/r/r, no lb  Abdomen: s/nt/nd, +BS    Results:  Lab Results   Component Value Date     04/26/2018    K 5.0 04/26/2018     04/26/2018    CO2 24 04/26/2018    BUN 54 (H) 04/26/2018    CREATININE 4.2 (H) 04/26/2018    CALCIUM 8.7 04/26/2018    ANIONGAP 6 (L) 04/26/2018    ESTGFRAFRICA 13 (A) 04/26/2018    EGFRNONAA 11 (A) 04/26/2018       Lab Results   Component Value Date    CALCIUM 8.7 04/26/2018    PHOS 4.8 (H) 04/26/2018         Recent Labs  Lab 04/26/18  0500   WBC 9.90   RBC 3.44*   HGB 9.2*   HCT 27.6*      MCV 80*   MCH 26.7*   MCHC 33.3       I have personally reviewed pertinent radiological imaging and reports.    Ashleigh Soria MD MPH  [facility]  [address] [place] [address]  [place] [address]  [phone] (p)  [phone] (f)

## 2018-04-26 NOTE — PLAN OF CARE
Problem: Patient Care Overview  Goal: Plan of Care Review  Outcome: Ongoing (interventions implemented as appropriate)  POC reviewed with pt with verbalized understanding. Free from falls, safety maintained, VSS. Call light in reach, bed locked/lowest position. Srx2. Will continue to monitor.

## 2018-04-26 NOTE — PLAN OF CARE
Problem: Patient Care Overview  Goal: Plan of Care Review  Outcome: Ongoing (interventions implemented as appropriate)  Patient safe and free from falls. Room air. NSR on monitor. 0.9% Nacl infusing at 75 to 20g R FA. Amb to BR. 1.5 L fluid rxn. Denies c/o pain. CBG monitored, SSI administered per orders. Swallows pills whole. Bed in lowest position, wheels locked, SR raised, call light in reach.

## 2018-04-27 PROBLEM — E11.29 TYPE 2 DIABETES MELLITUS WITH KIDNEY COMPLICATION, WITH LONG-TERM CURRENT USE OF INSULIN: Status: ACTIVE | Noted: 2018-04-25

## 2018-04-27 PROBLEM — N17.0 ACUTE RENAL FAILURE WITH ACUTE TUBULAR NECROSIS SUPERIMPOSED ON STAGE 4 CHRONIC KIDNEY DISEASE: Status: ACTIVE | Noted: 2018-04-24

## 2018-04-27 PROBLEM — Z79.4 TYPE 2 DIABETES MELLITUS WITH KIDNEY COMPLICATION, WITH LONG-TERM CURRENT USE OF INSULIN: Status: ACTIVE | Noted: 2018-04-25

## 2018-04-27 LAB
ANION GAP SERPL CALC-SCNC: 7 MMOL/L
BASOPHILS # BLD AUTO: 0.1 K/UL
BASOPHILS NFR BLD: 0.9 %
BUN SERPL-MCNC: 56 MG/DL
CALCIUM SERPL-MCNC: 8.4 MG/DL
CHLORIDE SERPL-SCNC: 111 MMOL/L
CO2 SERPL-SCNC: 19 MMOL/L
CREAT SERPL-MCNC: 3.9 MG/DL
DIFFERENTIAL METHOD: ABNORMAL
EOSINOPHIL # BLD AUTO: 0.2 K/UL
EOSINOPHIL NFR BLD: 2.5 %
ERYTHROCYTE [DISTWIDTH] IN BLOOD BY AUTOMATED COUNT: 14.5 %
EST. GFR  (AFRICAN AMERICAN): 14 ML/MIN/1.73 M^2
EST. GFR  (NON AFRICAN AMERICAN): 13 ML/MIN/1.73 M^2
GLUCOSE SERPL-MCNC: 209 MG/DL
HCT VFR BLD AUTO: 26.4 %
HGB BLD-MCNC: 8.9 G/DL
LYMPHOCYTES # BLD AUTO: 2.4 K/UL
LYMPHOCYTES NFR BLD: 27 %
MAGNESIUM SERPL-MCNC: 2 MG/DL
MCH RBC QN AUTO: 27.1 PG
MCHC RBC AUTO-ENTMCNC: 33.6 G/DL
MCV RBC AUTO: 81 FL
MONOCYTES # BLD AUTO: 0.7 K/UL
MONOCYTES NFR BLD: 7.6 %
NEUTROPHILS # BLD AUTO: 5.5 K/UL
NEUTROPHILS NFR BLD: 62 %
PHOSPHATE SERPL-MCNC: 4.9 MG/DL
PLATELET # BLD AUTO: 235 K/UL
PMV BLD AUTO: 8.8 FL
POCT GLUCOSE: 178 MG/DL (ref 70–110)
POCT GLUCOSE: 240 MG/DL (ref 70–110)
POCT GLUCOSE: 251 MG/DL (ref 70–110)
POTASSIUM SERPL-SCNC: 4.8 MMOL/L
RBC # BLD AUTO: 3.28 M/UL
SODIUM SERPL-SCNC: 137 MMOL/L
WBC # BLD AUTO: 8.8 K/UL

## 2018-04-27 PROCEDURE — 80048 BASIC METABOLIC PNL TOTAL CA: CPT

## 2018-04-27 PROCEDURE — 85025 COMPLETE CBC W/AUTO DIFF WBC: CPT

## 2018-04-27 PROCEDURE — 12000002 HC ACUTE/MED SURGE SEMI-PRIVATE ROOM

## 2018-04-27 PROCEDURE — 63600175 PHARM REV CODE 636 W HCPCS: Performed by: INTERNAL MEDICINE

## 2018-04-27 PROCEDURE — 36415 COLL VENOUS BLD VENIPUNCTURE: CPT

## 2018-04-27 PROCEDURE — 84100 ASSAY OF PHOSPHORUS: CPT

## 2018-04-27 PROCEDURE — 25000003 PHARM REV CODE 250: Performed by: INTERNAL MEDICINE

## 2018-04-27 PROCEDURE — 83735 ASSAY OF MAGNESIUM: CPT

## 2018-04-27 PROCEDURE — 25000003 PHARM REV CODE 250: Performed by: HOSPITALIST

## 2018-04-27 RX ORDER — DOXAZOSIN 1 MG/1
4 TABLET ORAL DAILY
Status: DISCONTINUED | OUTPATIENT
Start: 2018-04-28 | End: 2018-04-28 | Stop reason: HOSPADM

## 2018-04-27 RX ORDER — METOPROLOL SUCCINATE 50 MG/1
50 TABLET, EXTENDED RELEASE ORAL DAILY
Status: DISCONTINUED | OUTPATIENT
Start: 2018-04-28 | End: 2018-04-27

## 2018-04-27 RX ORDER — ACETAMINOPHEN 325 MG/1
650 TABLET ORAL EVERY 6 HOURS PRN
Status: DISCONTINUED | OUTPATIENT
Start: 2018-04-27 | End: 2018-04-27

## 2018-04-27 RX ORDER — DOXAZOSIN 1 MG/1
2 TABLET ORAL DAILY
Status: DISCONTINUED | OUTPATIENT
Start: 2018-04-28 | End: 2018-04-27

## 2018-04-27 RX ORDER — METOPROLOL SUCCINATE 50 MG/1
50 TABLET, EXTENDED RELEASE ORAL NIGHTLY
Status: DISCONTINUED | OUTPATIENT
Start: 2018-04-27 | End: 2018-04-28 | Stop reason: HOSPADM

## 2018-04-27 RX ADMIN — FERROUS SULFATE TAB EC 325 MG (65 MG FE EQUIVALENT) 325 MG: 325 (65 FE) TABLET DELAYED RESPONSE at 08:04

## 2018-04-27 RX ADMIN — EPOETIN ALFA 5000 UNITS: 20000 SOLUTION INTRAVENOUS; SUBCUTANEOUS at 03:04

## 2018-04-27 RX ADMIN — INSULIN ASPART 1 UNITS: 100 INJECTION, SOLUTION INTRAVENOUS; SUBCUTANEOUS at 08:04

## 2018-04-27 RX ADMIN — METOPROLOL TARTRATE 50 MG: 50 TABLET ORAL at 08:04

## 2018-04-27 RX ADMIN — METOPROLOL SUCCINATE 50 MG: 50 TABLET, EXTENDED RELEASE ORAL at 08:04

## 2018-04-27 RX ADMIN — ENOXAPARIN SODIUM 30 MG: 100 INJECTION SUBCUTANEOUS at 04:04

## 2018-04-27 RX ADMIN — GABAPENTIN 300 MG: 300 CAPSULE ORAL at 08:04

## 2018-04-27 RX ADMIN — NIFEDIPINE 90 MG: 30 TABLET, FILM COATED, EXTENDED RELEASE ORAL at 08:04

## 2018-04-27 RX ADMIN — HYDRALAZINE HYDROCHLORIDE 25 MG: 25 TABLET, FILM COATED ORAL at 05:04

## 2018-04-27 RX ADMIN — INSULIN ASPART 6 UNITS: 100 INJECTION, SOLUTION INTRAVENOUS; SUBCUTANEOUS at 04:04

## 2018-04-27 RX ADMIN — INSULIN ASPART 4 UNITS: 100 INJECTION, SOLUTION INTRAVENOUS; SUBCUTANEOUS at 05:04

## 2018-04-27 RX ADMIN — INSULIN ASPART 4 UNITS: 100 INJECTION, SOLUTION INTRAVENOUS; SUBCUTANEOUS at 12:04

## 2018-04-27 RX ADMIN — ASPIRIN 81 MG: 81 TABLET, COATED ORAL at 08:04

## 2018-04-27 RX ADMIN — DOXAZOSIN 2 MG: 1 TABLET ORAL at 08:04

## 2018-04-27 RX ADMIN — ATORVASTATIN CALCIUM 80 MG: 40 TABLET, FILM COATED ORAL at 08:04

## 2018-04-27 RX ADMIN — PANTOPRAZOLE SODIUM 40 MG: 40 TABLET, DELAYED RELEASE ORAL at 08:04

## 2018-04-27 RX ADMIN — SODIUM CHLORIDE: 0.9 INJECTION, SOLUTION INTRAVENOUS at 12:04

## 2018-04-27 NOTE — CONSULTS
"Food & Nutrition  Education    Diet Education: diabetic nutrition   Time Spent: 30 min  Learners:patient      Nutrition Education provided with handouts: yes    Comments: Patient is interested in getting control of her diabetes.  Notes she has had classes before, but would like to have more instruction.  Reviewed what a carb is, CHO counting, consistent carb intake, sample meal plan with timing of meals and label reading.    Adjusted diabetic nutrition information to conform with current low potassium diet.  Pt appeared positive about being able to follow the instructions. Recommended a resource, "Calorie Abel" to help her count carbs when not listed on labels or other nutrition guides.     All questions and concerns answered. Dietitian's contact information provided.       Follow-Up:yes    Please Re-consult as needed        Thanks!      "

## 2018-04-27 NOTE — ASSESSMENT & PLAN NOTE
Patient with history of chronic diastolic heart failure with hypertensive urgency and endorgan damage secondary to AK I.  Patient is currently on 8 blood pressure pills per day.  This is too much.  Will need to simplify her blood pressure regimen to ensure compliance.  I have changed the patient's blood pressure regimen to 1 dose of metoprolol in the evening, nifedipine and Cardura in the morning.  I have DC'd hydralazine secondary to compliance reasons.  Will follow patient blood pressure in the morning and hopefully her BP is stable we will discharge home if creatinine is also stable.

## 2018-04-27 NOTE — PLAN OF CARE
Problem: Patient Care Overview  Goal: Plan of Care Review  Outcome: Ongoing (interventions implemented as appropriate)  PT BP well controlled thus far this shift, BP medication adjusted by MD, pt to remain on IV fluids and monitor BUN and creatine tomorrow, Pt remains free of falls and acute events thus far this shift, bed low and locked, call light within reach will continue to monitor pt.

## 2018-04-27 NOTE — ASSESSMENT & PLAN NOTE
Patient is chronically on statin. Will continue for now. Monitor clinically. Last LDL was   Lab Results   Component Value Date    LDLCALC 170.2 (H) 05/17/2017

## 2018-04-27 NOTE — ASSESSMENT & PLAN NOTE
likely secondary to acute tubular necrosis likely from diastolic heart failure.  I would recommend continued gentle hydration will defer further orders to nephrology will continue with 75 cc normal saline for at least the next day and we will discharged tomorrow.

## 2018-04-27 NOTE — PROGRESS NOTES
INPATIENT NEPHROLOGY PROGRESS NOTE  [facility] NEPHROLOGY    Patient Name: Leanna García  Date: 04/27/2018    Reason for consultation: GODWIN    Chief Complaint:   Chief Complaint   Patient presents with    elevated BP       History of Present Illness:  53 y/o F with hx of HTN, HLD, AF, CVA, and DM who p/w elevated blood pressure in the morning (admit ) with headache. She takes metoprolol, nifedipine, lisinopril and lasix at home. Reviewed old records- She is a patient of Dr. Carlson. Last seen in clinic on 3/22/18. Cr 2.4. /82. Lasix was changed to daily. She says her baseline SBP at home is 150. 2/2018 renal duplex neg for RUDDY. We have been consulted for GODWIN, suspected ischemic ATN in setting of hypertensive crisis. Treated with cardene gtt in ER/ICU and then weaned to oral meds. Developed relative hypotension subsequently which further exacerbated renal injury.     · Interval History/Subjective:    - SBP < 150, on RA, no UOP recorded  - no cp, dyspnea, abd pain, edema    · Review of Systems: All 14 systems reviewed and negative, except as noted in HPI    Plan of Care:    Assessment:  AoCKI, baseline stage IV CKD (Cr 2-2.5)  HTN urgency  SHPT  Anemia of CKD     Plan:     1. Acute on Chronic Kidney Injury- Suspect ischemic ATN in the setting of labile BP, supported by UA with hyaline and granular casts. BP meds were reduced and she was given a fluid challenge to allow mild permissive HTN. Cr peaked yesterday and is now downtrending. Maintain -150 (she isn't used to SBP < 140 at baseline). Cut back NS to 50cc/hr. Need strict measurement of UOP. There is no acute indication for RRT. No NSAIDs or IV contrast.     2. Volume/Blood Pressure- BP is stable- remains with SBP < 150. Hospitalist dc'ed hydralazine. She remains on metoprolol, nifedipine and doxazosin. Keep ACE and diuretic on hold at discharge. Advise a 2g salt, low potassium, and 1.5L fluid restricted diet at discharge. She should monitor  BP/weight at home and provide log for Dr. Carlson to review. He will decide if/when ACE and diuretic can be resumed.      3. SHPT- Prior parameters from 12/2017 were at goal.      4. Anemia of CKD- Prior iron studies from 12/2017 were c/w chronic dx. Hb is drifting downward- suspect 2/2 dilution from IVFs. Continue iron pills. Ordered procrit 5K units x 1 today.      Should be stable for d/c from a renal standpoint tomorrow.  Needs renal panel early next week.  Needs renal f/u within 2 weeks of discharge.     Thank you for allowing us to participate in this patient's care. We will continue to follow.    Medications:  No current facility-administered medications on file prior to encounter.      Current Outpatient Prescriptions on File Prior to Encounter   Medication Sig Dispense Refill    acetaminophen (TYLENOL) 325 MG tablet Take 325 mg by mouth every 6 (six) hours as needed for Pain (headache).      ascorbic acid, vitamin C, (VITAMIN C) 500 MG tablet Take 1 tablet (500 mg total) by mouth every evening.      aspirin (ECOTRIN) 81 MG EC tablet Take 1 tablet (81 mg total) by mouth once daily.      atorvastatin (LIPITOR) 80 MG tablet Take 1 tablet (80 mg total) by mouth once daily. 90 tablet 3    fenofibrate 160 MG Tab Take 1 tablet (160 mg total) by mouth once daily. 90 tablet 3    ferrous sulfate 325 (65 FE) MG EC tablet Take 1 tablet (325 mg total) by mouth 2 (two) times daily with meals.  0    gabapentin (NEURONTIN) 300 MG capsule Take 1 capsule (300 mg total) by mouth every evening. 90 capsule 3    glipiZIDE (GLUCOTROL) 5 MG TR24 Take 1 tablet (5 mg total) by mouth 2 times daily 2 hours after meal. 180 tablet 3    metoprolol tartrate (LOPRESSOR) 50 MG tablet Take 1 tablet (50 mg total) by mouth once daily. 180 tablet 1    montelukast (SINGULAIR) 10 mg tablet Take 1 tablet (10 mg total) by mouth every evening. 90 tablet 1    albuterol 90 mcg/actuation inhaler Inhale 2 puffs into the lungs 4 (four) times  daily. (Patient taking differently: Inhale 2 puffs into the lungs every 4 (four) hours as needed. ) 1 Inhaler 1    blood sugar diagnostic (ACCU-CHEK KAYLIE PLUS TEST STRP) Strp 1 strip by Misc.(Non-Drug; Combo Route) route 3 (three) times daily. 300 strip 3    furosemide (LASIX) 20 MG tablet Take 1 tablet (20 mg total) by mouth every 72 hours. (Patient taking differently: Take 20 mg by mouth once daily. )  0    lisinopril 10 MG tablet Take 1 tablet (10 mg total) by mouth once daily. 30 tablet 2    NIFEdipine (PROCARDIA-XL) 90 MG (OSM) 24 hr tablet Take 1 tablet (90 mg total) by mouth once daily. 90 tablet 1    [DISCONTINUED] clorazepate (TRANXENE) 3.75 MG Tab Take 7.5 mg by mouth nightly as needed.        Scheduled Meds:   aspirin  81 mg Oral Daily    atorvastatin  80 mg Oral Daily    [START ON 4/28/2018] doxazosin  4 mg Oral Daily    enoxaparin  30 mg Subcutaneous Daily    ferrous sulfate  325 mg Oral BID    gabapentin  300 mg Oral QHS    metoprolol succinate  50 mg Oral QHS    NIFEdipine  90 mg Oral Daily    pantoprazole  40 mg Oral Daily     Continuous Infusions:   sodium chloride 0.9% 75 mL/hr at 04/27/18 0050     PRN Meds:.acetaminophen-codeine 300-30mg, dextrose 50%, dextrose 50%, glucagon (human recombinant), glucose, glucose, insulin aspart U-100, ondansetron, promethazine (PHENERGAN) IVPB, sodium chloride 0.9%, zolpidem    Allergies:  Dilaudid [hydromorphone (bulk)] and Lortab [hydrocodone-acetaminophen]    Vital Signs:  Temp Readings from Last 3 Encounters:   04/27/18 97.1 °F (36.2 °C)   02/11/18 97.9 °F (36.6 °C) (Oral)   01/18/18 98.2 °F (36.8 °C) (Oral)       Pulse Readings from Last 3 Encounters:   04/27/18 (!) 58   04/11/18 60   03/23/18 (!) 59       BP Readings from Last 3 Encounters:   04/27/18 (!) 141/77   04/11/18 (!) 147/75   03/23/18 (!) 168/78       Weight:  Wt Readings from Last 3 Encounters:   04/25/18 85.7 kg (189 lb)   04/11/18 93.7 kg (206 lb 9.6 oz)   03/23/18 94.2 kg (207  lb 10.8 oz)       INS/OUTS:  I/O last 3 completed shifts:  In: 3532.5 [P.O.:1620; I.V.:1912.5]  Out: -   No intake/output data recorded.    Physical Exam:  Constitutional: nad, aao x 3  Heart: rrr, no m/r/g, wwp, no edema  Lungs: ctab, no w/r/r/c, no lb  Abdomen: s/nt/nd, +BS    Results:  Lab Results   Component Value Date     04/27/2018    K 4.8 04/27/2018     (H) 04/27/2018    CO2 19 (L) 04/27/2018    BUN 56 (H) 04/27/2018    CREATININE 3.9 (H) 04/27/2018    CALCIUM 8.4 (L) 04/27/2018    ANIONGAP 7 (L) 04/27/2018    ESTGFRAFRICA 14 (A) 04/27/2018    EGFRNONAA 13 (A) 04/27/2018       Lab Results   Component Value Date    CALCIUM 8.4 (L) 04/27/2018    PHOS 4.9 (H) 04/27/2018         Recent Labs  Lab 04/27/18  0503   WBC 8.80   RBC 3.28*   HGB 8.9*   HCT 26.4*      MCV 81*   MCH 27.1   MCHC 33.6       I have personally reviewed pertinent radiological imaging and reports.    Ashleigh Soria MD MPH  [facility]  [address] [place] [id number]  [phone] (p)  [phone] (f)

## 2018-04-27 NOTE — SUBJECTIVE & OBJECTIVE
Interval History: Patient seen and examined.  Overnight, no acute events.  Creatinine remains elevated, and blood pressure is on the low side.  Patient plan of care discussed with the bedside nurse and the patient in detail.    Review of Systems   Constitutional: Negative for fatigue.   Respiratory: Negative for cough and shortness of breath.    Cardiovascular: Negative for chest pain and leg swelling.   Gastrointestinal: Negative for abdominal pain and nausea.   Neurological: Negative for weakness.   Psychiatric/Behavioral: Negative for confusion.   All other systems reviewed and are negative.    Objective:     Vital Signs (Most Recent):  Temp: 97.1 °F (36.2 °C) (04/27/18 1107)  Pulse: (!) 58 (04/27/18 1107)  Resp: 16 (04/27/18 1107)  BP: (!) 141/77 (04/27/18 1107)  SpO2: (!) 89 % (04/27/18 1107) Vital Signs (24h Range):  Temp:  [97.1 °F (36.2 °C)-98.7 °F (37.1 °C)] 97.1 °F (36.2 °C)  Pulse:  [56-67] 58  Resp:  [16-18] 16  SpO2:  [89 %-95 %] 89 %  BP: (111-146)/(56-77) 141/77     Weight: 85.7 kg (189 lb)  Body mass index is 27.91 kg/m².    Intake/Output Summary (Last 24 hours) at 04/27/18 1430  Last data filed at 04/27/18 0630   Gross per 24 hour   Intake             2605 ml   Output                0 ml   Net             2605 ml      Physical Exam   Constitutional: She is oriented to person, place, and time. She appears well-developed and well-nourished.   Eyes: EOM are normal. Pupils are equal, round, and reactive to light.   Cardiovascular: Normal rate, regular rhythm and intact distal pulses.    No murmur heard.  Pulmonary/Chest: Effort normal and breath sounds normal.   Abdominal: Soft. She exhibits no distension. There is no tenderness.   Musculoskeletal: She exhibits edema.   Neurological: She is alert and oriented to person, place, and time.   Skin: No rash noted. No pallor.   Nursing note and vitals reviewed.      Significant Labs: All pertinent labs within the past 24 hours have been  reviewed.    Significant Imaging: I have reviewed all pertinent imaging results/findings within the past 24 hours.

## 2018-04-27 NOTE — ASSESSMENT & PLAN NOTE
patient with chronic diastolic heart failure.  Afterload reduction is a must for this patient.  Will continue to pursue aggressive antihypertensive strategies and monitor patient response.  I do not believe the patient needs diuretics at this point in time.

## 2018-04-27 NOTE — PROGRESS NOTES
[facility] Hospital Medicine  Progress Note    Patient Name: Leanna García  MRN: [id number]  Patient Class: IP- Inpatient   Admission Date: 4/24/2018  Length of Stay: 3 days  Attending Physician: Arlsan Snyder MD  Primary Care Provider: Luis Hassan MD        Subjective:     Principal Problem:Hypertension    HPI:  Present to the ER with elevated BP. Noted today when she developed HA after crawfish boil. SBP in . No neurologic findings. Has h/o CKD with Cret of about 2.0 which is now ~3.0. Her past hx is significant for DM 2, and prior TIA. She is being admitted for BP control and IV hydration.    Hospital Course:  Patient admitted to the ICU and started on Cardene drip with improvement. She was changed to her home meds and seen by Nephrology with some additional changes in Meds.  4/26/18:  BP with good control.  Renal function worse  Started fluid challenge  Discussed with Nephrology.    Interval History: Patient seen and examined.  Overnight, no acute events.  Creatinine remains elevated, and blood pressure is on the low side.  Patient plan of care discussed with the bedside nurse and the patient in detail.    Review of Systems   Constitutional: Negative for fatigue.   Respiratory: Negative for cough and shortness of breath.    Cardiovascular: Negative for chest pain and leg swelling.   Gastrointestinal: Negative for abdominal pain and nausea.   Neurological: Negative for weakness.   Psychiatric/Behavioral: Negative for confusion.   All other systems reviewed and are negative.    Objective:     Vital Signs (Most Recent):  Temp: 97.1 °F (36.2 °C) (04/27/18 1107)  Pulse: (!) 58 (04/27/18 1107)  Resp: 16 (04/27/18 1107)  BP: (!) 141/77 (04/27/18 1107)  SpO2: (!) 89 % (04/27/18 1107) Vital Signs (24h Range):  Temp:  [97.1 °F (36.2 °C)-98.7 °F (37.1 °C)] 97.1 °F (36.2 °C)  Pulse:  [56-67] 58  Resp:  [16-18] 16  SpO2:  [89 %-95 %] 89 %  BP: (111-146)/(56-77) 141/77     Weight: 85.7 kg  (189 lb)  Body mass index is 27.91 kg/m².    Intake/Output Summary (Last 24 hours) at 04/27/18 1430  Last data filed at 04/27/18 0630   Gross per 24 hour   Intake             2605 ml   Output                0 ml   Net             2605 ml      Physical Exam   Constitutional: She is oriented to person, place, and time. She appears well-developed and well-nourished.   Eyes: EOM are normal. Pupils are equal, round, and reactive to light.   Cardiovascular: Normal rate, regular rhythm and intact distal pulses.    No murmur heard.  Pulmonary/Chest: Effort normal and breath sounds normal.   Abdominal: Soft. She exhibits no distension. There is no tenderness.   Musculoskeletal: She exhibits edema.   Neurological: She is alert and oriented to person, place, and time.   Skin: No rash noted. No pallor.   Nursing note and vitals reviewed.      Significant Labs: All pertinent labs within the past 24 hours have been reviewed.    Significant Imaging: I have reviewed all pertinent imaging results/findings within the past 24 hours.    Assessment/Plan:      * Hypertension    Patient with history of chronic diastolic heart failure with hypertensive urgency and endorgan damage secondary to AK I.  Patient is currently on 8 blood pressure pills per day.  This is too much.  Will need to simplify her blood pressure regimen to ensure compliance.  I have changed the patient's blood pressure regimen to 1 dose of metoprolol in the evening, nifedipine and Cardura in the morning.  I have DC'd hydralazine secondary to compliance reasons.  Will follow patient blood pressure in the morning and hopefully her BP is stable we will discharge home if creatinine is also stable.        Hyperlipemia     Patient is chronically on statin. Will continue for now. Monitor clinically. Last LDL was   Lab Results   Component Value Date    LDLCALC 170.2 (H) 05/17/2017                Type 2 diabetes mellitus with kidney complication, with long-term current use of  insulin    Patient's FSGs are not controlled on current hypoglycemics.   Last A1c reviewed-   Lab Results   Component Value Date    LABA1C 10.3 (H) 09/11/2015    HGBA1C 7.5 (H) 04/24/2018     Most recent fingerstick glucose reviewed-   Recent Labs  Lab 04/26/18  1605 04/26/18  2108 04/27/18  1222   POCTGLUCOSE 257* 281* 240*     Current correctional scale  Low  Maintain anti-hyperglycemic dose as follows-   Antihyperglycemics     Start     Stop Route Frequency Ordered    04/24/18 1311  insulin aspart U-100 pen 1-10 Units      -- SubQ Before meals & nightly PRN 04/24/18 1213                Acute renal failure with acute tubular necrosis superimposed on stage 4 chronic kidney disease    likely secondary to acute tubular necrosis likely from diastolic heart failure.  I would recommend continued gentle hydration will defer further orders to nephrology will continue with 75 cc normal saline for at least the next day and we will discharged tomorrow.        Chronic diastolic heart failure    patient with chronic diastolic heart failure.  Afterload reduction is a must for this patient.  Will continue to pursue aggressive antihypertensive strategies and monitor patient response.  I do not believe the patient needs diuretics at this point in time.            VTE Risk Mitigation         Ordered     enoxaparin injection 30 mg  Daily      04/24/18 1209     IP VTE HIGH RISK PATIENT  Once      04/24/18 1252     Place sequential compression device  Until discontinued      04/24/18 1252              Arslan Snyder MD  Department of Hospital Medicine   [facility]

## 2018-04-27 NOTE — PLAN OF CARE
Problem: Patient Care Overview  Goal: Plan of Care Review  Outcome: Ongoing (interventions implemented as appropriate)  Plan of care reviewed with pt, verbalized understanding. VSS, NAD noted. Gave 3 units of insulin per sliding scale for BG of 281. Patient sleeping throughout the night. Fall and safety precautions maintained. Will continue to monitor.

## 2018-04-27 NOTE — ASSESSMENT & PLAN NOTE
Patient's FSGs are not controlled on current hypoglycemics.   Last A1c reviewed-   Lab Results   Component Value Date    LABA1C 10.3 (H) 09/11/2015    HGBA1C 7.5 (H) 04/24/2018     Most recent fingerstick glucose reviewed-   Recent Labs  Lab 04/26/18  1605 04/26/18  2108 04/27/18  1222   POCTGLUCOSE 257* 281* 240*     Current correctional scale  Low  Maintain anti-hyperglycemic dose as follows-   Antihyperglycemics     Start     Stop Route Frequency Ordered    04/24/18 1311  insulin aspart U-100 pen 1-10 Units      -- SubQ Before meals & nightly PRN 04/24/18 1213

## 2018-04-28 VITALS
SYSTOLIC BLOOD PRESSURE: 136 MMHG | HEART RATE: 73 BPM | BODY MASS INDEX: 27.99 KG/M2 | HEIGHT: 69 IN | OXYGEN SATURATION: 94 % | RESPIRATION RATE: 18 BRPM | WEIGHT: 189 LBS | DIASTOLIC BLOOD PRESSURE: 66 MMHG | TEMPERATURE: 98 F

## 2018-04-28 LAB
ANION GAP SERPL CALC-SCNC: 7 MMOL/L
BASOPHILS # BLD AUTO: 0.1 K/UL
BASOPHILS NFR BLD: 0.9 %
BUN SERPL-MCNC: 53 MG/DL
CALCIUM SERPL-MCNC: 8.6 MG/DL
CHLORIDE SERPL-SCNC: 113 MMOL/L
CO2 SERPL-SCNC: 20 MMOL/L
CREAT SERPL-MCNC: 3.5 MG/DL
DIFFERENTIAL METHOD: ABNORMAL
EOSINOPHIL # BLD AUTO: 0.2 K/UL
EOSINOPHIL NFR BLD: 1.9 %
ERYTHROCYTE [DISTWIDTH] IN BLOOD BY AUTOMATED COUNT: 14.8 %
EST. GFR  (AFRICAN AMERICAN): 16 ML/MIN/1.73 M^2
EST. GFR  (NON AFRICAN AMERICAN): 14 ML/MIN/1.73 M^2
GLUCOSE SERPL-MCNC: 222 MG/DL
HCT VFR BLD AUTO: 26.2 %
HGB BLD-MCNC: 8.8 G/DL
LYMPHOCYTES # BLD AUTO: 2 K/UL
LYMPHOCYTES NFR BLD: 22.7 %
MAGNESIUM SERPL-MCNC: 1.8 MG/DL
MCH RBC QN AUTO: 27 PG
MCHC RBC AUTO-ENTMCNC: 33.8 G/DL
MCV RBC AUTO: 80 FL
MONOCYTES # BLD AUTO: 0.6 K/UL
MONOCYTES NFR BLD: 7.5 %
NEUTROPHILS # BLD AUTO: 5.8 K/UL
NEUTROPHILS NFR BLD: 67 %
PHOSPHATE SERPL-MCNC: 4.4 MG/DL
PLATELET # BLD AUTO: 239 K/UL
PMV BLD AUTO: 9 FL
POCT GLUCOSE: 208 MG/DL (ref 70–110)
POCT GLUCOSE: 224 MG/DL (ref 70–110)
POTASSIUM SERPL-SCNC: 4.9 MMOL/L
RBC # BLD AUTO: 3.27 M/UL
SODIUM SERPL-SCNC: 140 MMOL/L
WBC # BLD AUTO: 8.7 K/UL

## 2018-04-28 PROCEDURE — 25000003 PHARM REV CODE 250: Performed by: HOSPITALIST

## 2018-04-28 PROCEDURE — 83735 ASSAY OF MAGNESIUM: CPT

## 2018-04-28 PROCEDURE — 84100 ASSAY OF PHOSPHORUS: CPT

## 2018-04-28 PROCEDURE — 25000242 PHARM REV CODE 250 ALT 637 W/ HCPCS: Performed by: NURSE PRACTITIONER

## 2018-04-28 PROCEDURE — 85025 COMPLETE CBC W/AUTO DIFF WBC: CPT

## 2018-04-28 PROCEDURE — 25000003 PHARM REV CODE 250: Performed by: INTERNAL MEDICINE

## 2018-04-28 PROCEDURE — 36415 COLL VENOUS BLD VENIPUNCTURE: CPT

## 2018-04-28 PROCEDURE — 80048 BASIC METABOLIC PNL TOTAL CA: CPT

## 2018-04-28 RX ORDER — FLUTICASONE PROPIONATE 50 MCG
2 SPRAY, SUSPENSION (ML) NASAL DAILY
Status: DISCONTINUED | OUTPATIENT
Start: 2018-04-28 | End: 2018-04-28 | Stop reason: HOSPADM

## 2018-04-28 RX ORDER — METOPROLOL TARTRATE 50 MG/1
50 TABLET ORAL NIGHTLY
Qty: 180 TABLET | Refills: 1 | Status: SHIPPED | OUTPATIENT
Start: 2018-04-28 | End: 2018-10-23 | Stop reason: ALTCHOICE

## 2018-04-28 RX ORDER — DOXAZOSIN 4 MG/1
4 TABLET ORAL DAILY
Qty: 30 TABLET | Refills: 11 | Status: SHIPPED | OUTPATIENT
Start: 2018-04-29 | End: 2018-05-30 | Stop reason: SDUPTHER

## 2018-04-28 RX ADMIN — INSULIN ASPART 4 UNITS: 100 INJECTION, SOLUTION INTRAVENOUS; SUBCUTANEOUS at 07:04

## 2018-04-28 RX ADMIN — FLUTICASONE PROPIONATE 100 MCG: 50 SPRAY, METERED NASAL at 10:04

## 2018-04-28 RX ADMIN — ATORVASTATIN CALCIUM 80 MG: 40 TABLET, FILM COATED ORAL at 08:04

## 2018-04-28 RX ADMIN — DOXAZOSIN 4 MG: 1 TABLET ORAL at 08:04

## 2018-04-28 RX ADMIN — PANTOPRAZOLE SODIUM 40 MG: 40 TABLET, DELAYED RELEASE ORAL at 08:04

## 2018-04-28 RX ADMIN — ASPIRIN 81 MG: 81 TABLET, COATED ORAL at 08:04

## 2018-04-28 RX ADMIN — NIFEDIPINE 90 MG: 30 TABLET, FILM COATED, EXTENDED RELEASE ORAL at 08:04

## 2018-04-28 RX ADMIN — INSULIN ASPART 4 UNITS: 100 INJECTION, SOLUTION INTRAVENOUS; SUBCUTANEOUS at 11:04

## 2018-04-28 RX ADMIN — FERROUS SULFATE TAB EC 325 MG (65 MG FE EQUIVALENT) 325 MG: 325 (65 FE) TABLET DELAYED RESPONSE at 08:04

## 2018-04-28 NOTE — PLAN OF CARE
Problem: Patient Care Overview  Goal: Plan of Care Review  Outcome: Ongoing (interventions implemented as appropriate)  Patient AAO, VSS.  IVF infusing as ordered. Tele monitored; sinus rhythm.  No reports of pain or NV.  Pt verbalized understanding of POC.  Purposeful rounding done during shift to promote patient safety. Patient free from falls and injury during shift.  Non skid socks on when OOB.  Bed in lowest position, brakes locked, and call light within reach.  Will continue to monitor.

## 2018-04-28 NOTE — NURSING
Dr. Hassan updated on needing HH orders. Discharge instructions reviewed with patient and spouse, understanding verbalized, all questions answered. IV catheter removed, catheter intact, tolerated well. Telemetry monitor removed and returned to monitor room. Awaiting for HH orders. Will continue to monitor.

## 2018-04-28 NOTE — DISCHARGE SUMMARY
[facility] Hospital Medicine  Discharge Summary      Patient Name: Leanna García  MRN: [id number]  Admission Date: 4/24/2018  Hospital Length of Stay: 4 days  Discharge Date and Time:  04/28/2018 3:54 PM  Attending Physician: Luis Hassan MD   Discharging Provider: Luis Hassan MD  Primary Care Provider: Luis Hassan MD      HPI:   Present to the ER with elevated BP. Noted today when she developed HA after crawfish boil. SBP in . No neurologic findings. Has h/o CKD with Cret of about 2.0 which is now ~3.0. Her past hx is significant for DM 2, and prior TIA. She is being admitted for BP control and IV hydration.    * No surgery found *      Hospital Course:   Patient admitted to the ICU and started on Cardene drip with improvement. She was changed to her home meds and seen by Nephrology with some additional changes in Meds.  4/26/18:  BP with good control.  Renal function worse  Started fluid challenge  Discussed with Nephrology.  Blood pressure medicines were optimized during the patient's hospital course with improvements in blood pressure to prehypertensive range.  It was deemed appropriate that patient could be discharged to follow-up with her primary care provider for further management of this condition.  Patient examined by myself on discharge, unlabored breathing, negative cardiac exam.     Consults:   Consults         Status Ordering Provider     Inpatient consult to Registered Dietitian/Nutritionist  Once     Provider:  (Not yet assigned)    BOONE Clemente     Inpatient consult to Social Work/Case Management  Once     Provider:  (Not yet assigned)    Completed BOONE DELEON     Nephrology  Once     Provider:  MD Maurizio Roldan STEPHEN M.          No new Assessment & Plan notes have been filed under this hospital service since the last note was generated.  Service: Hospital Medicine    Final Active Diagnoses:    Diagnosis  Date Noted POA    PRINCIPAL PROBLEM:  Hypertension [I10] 04/24/2018 Yes    Type 2 diabetes mellitus with kidney complication, with long-term current use of insulin [E11.29, Z79.4] 04/25/2018 Not Applicable    Acute renal failure with acute tubular necrosis superimposed on stage 4 chronic kidney disease [N17.0, N18.4] 04/24/2018 Yes    Hyperlipemia [E78.5] 04/24/2018 Yes    Chronic diastolic heart failure [I50.32] 10/15/2016 Yes      Problems Resolved During this Admission:    Diagnosis Date Noted Date Resolved POA       Discharged Condition: good    Disposition: Home or Self Care    Follow Up:  Follow-up Information     Luis Hassan MD In 2 weeks.    Specialty:  Family Medicine  Contact information:  [facility]RIG[facility]  [address] [place], [place] [id number] [phone]                 Patient Instructions:     Diet renal     Activity as tolerated     Notify your health care provider if you experience any of the following:  temperature >100.4     Notify your health care provider if you experience any of the following:  persistent nausea and vomiting or diarrhea     Notify your health care provider if you experience any of the following:  increased confusion or weakness         Significant Diagnostic Studies: Labs:   BMP:   Recent Labs  Lab 04/27/18  0503 04/28/18  0457   * 222*    140   K 4.8 4.9   * 113*   CO2 19* 20*   BUN 56* 53*   CREATININE 3.9* 3.5*   CALCIUM 8.4* 8.6*   MG 2.0 1.8   , CMP   Recent Labs  Lab 04/27/18  0503 04/28/18  0457    140   K 4.8 4.9   * 113*   CO2 19* 20*   * 222*   BUN 56* 53*   CREATININE 3.9* 3.5*   CALCIUM 8.4* 8.6*   ANIONGAP 7* 7*   ESTGFRAFRICA 14* 16*   EGFRNONAA 13* 14*   , CBC   Recent Labs  Lab 04/27/18  0503 04/28/18  0457   WBC 8.80 8.70   HGB 8.9* 8.8*   HCT 26.4* 26.2*    239    and INR   Lab Results   Component Value Date    INR 1.0 10/14/2016    INR 1.0 07/27/2015    INR 1.2 (H) 06/12/2013       Pending Diagnostic Studies:      None         Medications:  Reconciled Home Medications:      Medication List      START taking these medications    doxazosin 4 MG tablet  Commonly known as:  CARDURA  Take 1 tablet (4 mg total) by mouth once daily.  Start taking on:  4/29/2018        CHANGE how you take these medications    albuterol 90 mcg/actuation inhaler  Inhale 2 puffs into the lungs 4 (four) times daily.  What changed:  · when to take this  · reasons to take this     furosemide 20 MG tablet  Commonly known as:  LASIX  Take 1 tablet (20 mg total) by mouth every 72 hours.  What changed:  when to take this     metoprolol tartrate 50 MG tablet  Commonly known as:  LOPRESSOR  Take 1 tablet (50 mg total) by mouth every evening.  What changed:  when to take this        CONTINUE taking these medications    acetaminophen 325 MG tablet  Commonly known as:  TYLENOL  Take 325 mg by mouth every 6 (six) hours as needed for Pain (headache).     ascorbic acid (vitamin C) 500 MG tablet  Commonly known as:  VITAMIN C  Take 1 tablet (500 mg total) by mouth every evening.     aspirin 81 MG EC tablet  Commonly known as:  ECOTRIN  Take 1 tablet (81 mg total) by mouth once daily.     atorvastatin 80 MG tablet  Commonly known as:  LIPITOR  Take 1 tablet (80 mg total) by mouth once daily.     blood sugar diagnostic Strp  Commonly known as:  ACCU-CHEK KAYLIE PLUS TEST STRP  1 strip by Misc.(Non-Drug; Combo Route) route 3 (three) times daily.     fenofibrate 160 MG Tab  Take 1 tablet (160 mg total) by mouth once daily.     ferrous sulfate 325 (65 FE) MG EC tablet  Take 1 tablet (325 mg total) by mouth 2 (two) times daily with meals.     gabapentin 300 MG capsule  Commonly known as:  NEURONTIN  Take 1 capsule (300 mg total) by mouth every evening.     glipiZIDE 5 MG Tr24  Commonly known as:  GLUCOTROL  Take 1 tablet (5 mg total) by mouth 2 times daily 2 hours after meal.     lisinopril 10 MG tablet  Take 1 tablet (10 mg total) by mouth once daily.     montelukast 10  mg tablet  Commonly known as:  SINGULAIR  Take 1 tablet (10 mg total) by mouth every evening.     NIFEdipine 90 MG (OSM) 24 hr tablet  Commonly known as:  PROCARDIA-XL  Take 1 tablet (90 mg total) by mouth once daily.        STOP taking these medications    clorazepate 3.75 MG Tab  Commonly known as:  TRANXENE            Indwelling Lines/Drains at time of discharge:   Lines/Drains/Airways          No matching active lines, drains, or airways          Time spent on the discharge of patient: 30 minutes  Patient was seen and examined on the date of discharge and determined to be suitable for discharge.         Luis Hassan MD  Department of Hospital Medicine  [facility]

## 2018-04-28 NOTE — PROGRESS NOTES
INPATIENT NEPHROLOGY PROGRESS NOTE  [facility] NEPHROLOGY    Patient Name: Leanna García  Date: 04/28/2018    Reason for consultation: GODWIN    Chief Complaint:   Chief Complaint   Patient presents with    elevated BP       History of Present Illness:  53 y/o F with hx of HTN, HLD, AF, CVA, and DM who p/w elevated blood pressure in the morning (admit ) with headache. She takes metoprolol, nifedipine, lisinopril and lasix at home. Reviewed old records- She is a patient of Dr. Carlson. Last seen in clinic on 3/22/18. Cr 2.4. /82. Lasix was changed to daily. She says her baseline SBP at home is 150. 2/2018 renal duplex neg for RUDDY. We have been consulted for GODWIN, suspected ischemic ATN in setting of hypertensive crisis. Treated with cardene gtt in ER/ICU and then weaned to oral meds. Developed relative hypotension subsequently which further exacerbated renal injury.     · Interval History/Subjective:    - SBP < 150, on RA, no UOP recorded  - no cp, dyspnea, abd pain, edema    4/28/18-- Scr trending down, now 3.5.  Hgb dropping, 8.8 today.  Pt states she feels good, wants to go home.    · Review of Systems: All 14 systems reviewed and negative, except as noted in HPI    Plan of Care:    Assessment:  AoCKI, baseline stage IV CKD (Cr 2-2.5)  HTN urgency  SHPT  Anemia of CKD     Plan:     1. Acute on Chronic Kidney Injury- Suspect ischemic ATN in the setting of labile BP, supported by UA with hyaline and granular casts. BP meds were reduced and she was given a fluid challenge to allow mild permissive HTN. Cr peaked yesterday and is now downtrending. Maintain -150 (she isn't used to SBP < 140 at baseline). Cut back NS to 50cc/hr. Need strict measurement of UOP. There is no acute indication for RRT. No NSAIDs or IV contrast.     2. Volume/Blood Pressure- BP is stable- remains with SBP < 150. Hospitalist dc'ed hydralazine. She remains on metoprolol, nifedipine and doxazosin. Keep ACE and diuretic on hold  at discharge. Advise a 2g salt, low potassium, and 1.5L fluid restricted diet at discharge. She should monitor BP/weight at home and provide log for Dr. Carlson to review. He will decide if/when ACE and diuretic can be resumed.      3. SHPT- Prior parameters from 12/2017 were at goal.      4. Anemia of CKD- Prior iron studies from 12/2017 were c/w chronic dx. Hb is drifting downward- suspect 2/2 dilution from IVFs. Continue iron pills. Procrit given yesterday, 5,000u.      Should be stable for d/c from a renal standpoint.  Needs renal panel early next week.  Needs renal f/u within 2 weeks of discharge.     Thank you for allowing us to participate in this patient's care. We will continue to follow.    Medications:  No current facility-administered medications on file prior to encounter.      Current Outpatient Prescriptions on File Prior to Encounter   Medication Sig Dispense Refill    acetaminophen (TYLENOL) 325 MG tablet Take 325 mg by mouth every 6 (six) hours as needed for Pain (headache).      ascorbic acid, vitamin C, (VITAMIN C) 500 MG tablet Take 1 tablet (500 mg total) by mouth every evening.      aspirin (ECOTRIN) 81 MG EC tablet Take 1 tablet (81 mg total) by mouth once daily.      atorvastatin (LIPITOR) 80 MG tablet Take 1 tablet (80 mg total) by mouth once daily. 90 tablet 3    fenofibrate 160 MG Tab Take 1 tablet (160 mg total) by mouth once daily. 90 tablet 3    ferrous sulfate 325 (65 FE) MG EC tablet Take 1 tablet (325 mg total) by mouth 2 (two) times daily with meals.  0    gabapentin (NEURONTIN) 300 MG capsule Take 1 capsule (300 mg total) by mouth every evening. 90 capsule 3    glipiZIDE (GLUCOTROL) 5 MG TR24 Take 1 tablet (5 mg total) by mouth 2 times daily 2 hours after meal. 180 tablet 3    metoprolol tartrate (LOPRESSOR) 50 MG tablet Take 1 tablet (50 mg total) by mouth once daily. 180 tablet 1    montelukast (SINGULAIR) 10 mg tablet Take 1 tablet (10 mg total) by mouth every evening.  90 tablet 1    albuterol 90 mcg/actuation inhaler Inhale 2 puffs into the lungs 4 (four) times daily. (Patient taking differently: Inhale 2 puffs into the lungs every 4 (four) hours as needed. ) 1 Inhaler 1    blood sugar diagnostic (ACCU-CHEK KAYLIE PLUS TEST STRP) Strp 1 strip by Misc.(Non-Drug; Combo Route) route 3 (three) times daily. 300 strip 3    furosemide (LASIX) 20 MG tablet Take 1 tablet (20 mg total) by mouth every 72 hours. (Patient taking differently: Take 20 mg by mouth once daily. )  0    lisinopril 10 MG tablet Take 1 tablet (10 mg total) by mouth once daily. 30 tablet 2    NIFEdipine (PROCARDIA-XL) 90 MG (OSM) 24 hr tablet Take 1 tablet (90 mg total) by mouth once daily. 90 tablet 1    [DISCONTINUED] clorazepate (TRANXENE) 3.75 MG Tab Take 7.5 mg by mouth nightly as needed.        Scheduled Meds:   aspirin  81 mg Oral Daily    atorvastatin  80 mg Oral Daily    doxazosin  4 mg Oral Daily    enoxaparin  30 mg Subcutaneous Daily    ferrous sulfate  325 mg Oral BID    fluticasone  2 spray Each Nare Daily    gabapentin  300 mg Oral QHS    metoprolol succinate  50 mg Oral QHS    NIFEdipine  90 mg Oral Daily    pantoprazole  40 mg Oral Daily     Continuous Infusions:   sodium chloride 0.9% 50 mL/hr at 04/27/18 1230     PRN Meds:.acetaminophen-codeine 300-30mg, dextrose 50%, dextrose 50%, glucagon (human recombinant), glucose, glucose, insulin aspart U-100, ondansetron, promethazine (PHENERGAN) IVPB, sodium chloride 0.9%, zolpidem    Allergies:  Dilaudid [hydromorphone (bulk)] and Lortab [hydrocodone-acetaminophen]    Vital Signs:  Temp Readings from Last 3 Encounters:   04/28/18 97.6 °F (36.4 °C) (Oral)   02/11/18 97.9 °F (36.6 °C) (Oral)   01/18/18 98.2 °F (36.8 °C) (Oral)       Pulse Readings from Last 3 Encounters:   04/28/18 (!) 59   04/11/18 60   03/23/18 (!) 59       BP Readings from Last 3 Encounters:   04/28/18 137/61   04/11/18 (!) 147/75   03/23/18 (!) 168/78       Weight:  Wt  Readings from Last 3 Encounters:   04/25/18 85.7 kg (189 lb)   04/11/18 93.7 kg (206 lb 9.6 oz)   03/23/18 94.2 kg (207 lb 10.8 oz)       INS/OUTS:  I/O last 3 completed shifts:  In: 1915 [P.O.:690; I.V.:1225]  Out: -   No intake/output data recorded.    Physical Exam:  Constitutional: nad, aao x 3  Heart: rrr, no m/r/g, wwp, no edema  Lungs: ctab, no w/r/r/c, no lb  Abdomen: s/nt/nd, +BS    Results:  Lab Results   Component Value Date     04/28/2018    K 4.9 04/28/2018     (H) 04/28/2018    CO2 20 (L) 04/28/2018    BUN 53 (H) 04/28/2018    CREATININE 3.5 (H) 04/28/2018    CALCIUM 8.6 (L) 04/28/2018    ANIONGAP 7 (L) 04/28/2018    ESTGFRAFRICA 16 (A) 04/28/2018    EGFRNONAA 14 (A) 04/28/2018       Lab Results   Component Value Date    CALCIUM 8.6 (L) 04/28/2018    PHOS 4.4 04/28/2018         Recent Labs  Lab 04/28/18  0457   WBC 8.70   RBC 3.27*   HGB 8.8*   HCT 26.2*      MCV 80*   MCH 27.0   MCHC 33.8       I have personally reviewed pertinent radiological imaging and reports.    [facility]  [facility] [address] [place] [address]  [phone] (p)  [phone] (f)

## 2018-04-28 NOTE — PLAN OF CARE
Problem: Patient Care Overview  Goal: Plan of Care Review  Outcome: Ongoing (interventions implemented as appropriate)  POC reviewed with pt, understanding verbalized. AAOX4. No c/o pain throughout shift. I&O's. Up ad Ashlee. O2 at 2L. Glucose monitoring/Coverage AC/HS As needed. DM/Renal/Cardiac diet with good appetite. Tele monitoring maintained. NS discontinued around noon today, tolerating well. Safety Maintained. Will continue to monitor.

## 2018-04-28 NOTE — PLAN OF CARE
Discharge planner notified [facility] PULID[facility] on-call Annelise of patient's discharge disposition. Sent referral via [facility]

## 2018-04-28 NOTE — PLAN OF CARE
Discharge planner updated patient's nurse Bryce home health orders are needed. Pending orders to send to Chas PULIDO

## 2018-04-29 NOTE — PLAN OF CARE
04/29/18 0826   Final Note   Assessment Type Final Discharge Note   Discharge Disposition Home-Health

## 2018-04-30 ENCOUNTER — TELEPHONE (OUTPATIENT)
Dept: MEDSURG UNIT | Facility: HOSPITAL | Age: 53
End: 2018-04-30

## 2018-04-30 ENCOUNTER — TELEPHONE (OUTPATIENT)
Dept: FAMILY MEDICINE | Facility: CLINIC | Age: 53
End: 2018-04-30

## 2018-04-30 ENCOUNTER — PATIENT OUTREACH (OUTPATIENT)
Dept: ADMINISTRATIVE | Facility: CLINIC | Age: 53
End: 2018-04-30

## 2018-04-30 NOTE — PATIENT INSTRUCTIONS
Discharge Instructions for High Blood Pressure (Hypertension)    You have been diagnosed with high blood pressure (also called hypertension). This means the force of blood against your artery walls is too strong. It also means your heart is working hard to move blood. High blood pressure usually has no symptoms, but over time, it can damage your heart, blood vessels, eyes, kidneys, and other organs. With help from your doctor, you can manage your blood pressure and protect your health.    Taking Medications    Learn to take your own blood pressure. Keep a record of your results. Ask your doctor which readings mean that you need medical attention.  Take your blood pressure medication exactly as directed. Dont skip doses. Missing doses can cause your blood pressure to get out of control.  Avoid medications that contain heart stimulants, including over-the-counter drugs. Check for warnings about high blood pressure on the label.  Check with your doctor before taking a decongestant. Some decongestants can worsen high blood pressure.    Lifestyle Changes    Maintain a healthy weight. Get help to lose any extra pounds.  Cut back on salt.  Limit canned, dried, packaged, and fast foods.  Dont add salt to your food at the table.  Season foods with herbs instead of salt when you cook.  Follow the DASH (Dietary Approaches to Stop Hypertension) eating plan. This plan recommends vegetables, fruits, whole gains, and other heart healthy foods.  Begin an exercise program. Ask your doctor how to get started. You can benefit from simple activities like walking or gardening.  Break the smoking habit. Enroll in a stop-smoking program to improve your chances of success. Ask your health care provider about programs and medications to help you stop smoking.  Limit drinks that contain caffeine (coffee, black or green tea, cola) to 2 per day.  Never take stimulants such as amphetamines or cocaine; these drugs can be deadly for someone  with high blood pressure.  Control your stress. Learn stress-management techniques.  Limit alcohol to no more than 1 drink a day for women and 2 drinks a day for men.    Follow-Up  Make a follow-up appointment as directed by our staff.    When to Call Your Doctor  Call your doctor immediately if you have any of the following:  Chest pain or shortness of breath (call 911)  Moderate to severe headache  Weakness in the muscles of your face, arms, or legs  Trouble speaking  Extreme drowsiness  Confusion  Fainting or dizziness  Pulsating or rushing sound in your ears  Unexplained nosebleed  Weakness, tingling, or numbness of your face, arms, or legs  Change in vision  Blood pressure measured at home that is greater than 180/110    © 4428-5565 [employer] [place], [address] [place] [address], [place] [address]. All rights reserved. This information is not intended as a substitute for professional medical care. Always follow your healthcare professional's instructions.

## 2018-04-30 NOTE — TELEPHONE ENCOUNTER
----- Message from Raina Max sent at 4/30/2018 10:19 AM CDT -----  Contact: self  Returning missed call. Please call back [phone]

## 2018-04-30 NOTE — TELEPHONE ENCOUNTER
----- Message from Neo Welch sent at 4/30/2018  9:49 AM CDT -----  Contact: Pt  Name of Who is Calling: Leanna      What is the request in detail: Pt is calling in regards to getting appointment access for a follow up. Pt states she was told to get appointment access for about 2 weeks to see doctor.      Can the clinic reply by [facility]: no      What Number to Call Back if not in [facility]: .[phone] (home)

## 2018-05-03 ENCOUNTER — TELEPHONE (OUTPATIENT)
Dept: FAMILY MEDICINE | Facility: CLINIC | Age: 53
End: 2018-05-03

## 2018-05-03 NOTE — TELEPHONE ENCOUNTER
----- Message from Leilani Case sent at 5/3/2018 11:34 AM CDT -----  Contact: Vicky mcconnell/[facility] [phone]  Patient improved from last nurse visit, blood pressure 138/78, 4 lb weight loss and decreased swelling in legs.  Thank you!

## 2018-05-11 ENCOUNTER — TELEPHONE (OUTPATIENT)
Dept: ADMINISTRATIVE | Facility: CLINIC | Age: 53
End: 2018-05-11

## 2018-05-11 ENCOUNTER — TELEPHONE (OUTPATIENT)
Dept: FAMILY MEDICINE | Facility: CLINIC | Age: 53
End: 2018-05-11

## 2018-05-11 NOTE — TELEPHONE ENCOUNTER
----- Message from Luis Hassan MD sent at 5/10/2018  5:00 PM CDT -----  Contact: Vicky with [facility] ph#[phone]  No. Thank You    ----- Message -----  From: Christy Valdivia LPN  Sent: 5/10/2018   2:21 PM  To: Luis Hassan MD    What labs would you want ordered with home health?  Please advise:  ----- Message -----  From: Shannan More  Sent: 5/10/2018  11:04 AM  To: Mo Perry with [facility] ph#[phone]  Patient has an appt on May 18th  Has not been able to see Dr Carlson, due to transportation issues.    7 pd wt lost since April 29th   As of today no swelling in legs  Would you like for labs to be ordered before appt  Fax#[phone]

## 2018-05-18 ENCOUNTER — OFFICE VISIT (OUTPATIENT)
Dept: FAMILY MEDICINE | Facility: CLINIC | Age: 53
End: 2018-05-18
Payer: MEDICARE

## 2018-05-18 VITALS
BODY MASS INDEX: 30.3 KG/M2 | DIASTOLIC BLOOD PRESSURE: 68 MMHG | HEIGHT: 69 IN | SYSTOLIC BLOOD PRESSURE: 148 MMHG | WEIGHT: 204.56 LBS | HEART RATE: 60 BPM

## 2018-05-18 DIAGNOSIS — Z09 HOSPITAL DISCHARGE FOLLOW-UP: Primary | ICD-10-CM

## 2018-05-18 DIAGNOSIS — N18.5 STAGE 5 CHRONIC KIDNEY DISEASE NOT ON CHRONIC DIALYSIS: ICD-10-CM

## 2018-05-18 DIAGNOSIS — E11.59 HYPERTENSION ASSOCIATED WITH DIABETES: Chronic | ICD-10-CM

## 2018-05-18 DIAGNOSIS — I15.2 HYPERTENSION ASSOCIATED WITH DIABETES: Chronic | ICD-10-CM

## 2018-05-18 PROBLEM — I10 HYPERTENSION: Status: RESOLVED | Noted: 2018-04-24 | Resolved: 2018-05-18

## 2018-05-18 PROCEDURE — 3077F SYST BP >= 140 MM HG: CPT | Mod: CPTII,S$GLB,, | Performed by: FAMILY MEDICINE

## 2018-05-18 PROCEDURE — 3008F BODY MASS INDEX DOCD: CPT | Mod: CPTII,S$GLB,, | Performed by: FAMILY MEDICINE

## 2018-05-18 PROCEDURE — 99214 OFFICE O/P EST MOD 30 MIN: CPT | Mod: S$GLB,,, | Performed by: FAMILY MEDICINE

## 2018-05-18 PROCEDURE — 99999 PR PBB SHADOW E&M-EST. PATIENT-LVL III: CPT | Mod: PBBFAC,,, | Performed by: FAMILY MEDICINE

## 2018-05-18 PROCEDURE — 3045F PR MOST RECENT HEMOGLOBIN A1C LEVEL 7.0-9.0%: CPT | Mod: CPTII,S$GLB,, | Performed by: FAMILY MEDICINE

## 2018-05-18 PROCEDURE — 3078F DIAST BP <80 MM HG: CPT | Mod: CPTII,S$GLB,, | Performed by: FAMILY MEDICINE

## 2018-05-18 NOTE — PROGRESS NOTES
Subjective:       Patient ID: Leanna García is a 52 y.o. female.    Chief Complaint: Hospital Follow Up      Hospital Discharge Follow Up   Pt is here to follow up for recent hospitalization.   The patient was admitted after experiencing high BP readings.  Pt reports that during the course of hospitalization, pt was started on Cardene drip and had improvement in BP.   Changes to medications after discharge include initiation of doxazosin and increased metoprolol.   Since discharge, the pt has monitored her BP at home and her levels have been 140's-160's/70's-90's.    I have personally reviewed pertinent imaging and labs which show ESRD. .    Past Medical History:   Diagnosis Date    A-fib     resolved per patient    Arthritis     Bronchitis     Cardiovascular event risk, ASCVD 10-year risk 6.7% 10/15/2016    Diabetes mellitus     Diabetes mellitus type II     Encounter for blood transfusion     History of colon polyps 11/2/2016    Hyperlipidemia     Hypertension     Stroke        Past Surgical History:   Procedure Laterality Date    COLONOSCOPY N/A 10/5/2016    Procedure: COLONOSCOPY;  Surgeon: Pillo Chanel MD;  Location[facility];  Service: Endoscopy;  Laterality: N/A;    HERNIA REPAIR Bilateral 11/22/2016    inguinal    HYSTERECTOMY         Family History   Problem Relation Age of Onset    Hyperlipidemia Mother     Hypertension Mother     Hypertension Father     Diabetes Father     Diabetes Sister     Diabetes Sister     Diabetes Maternal Aunt     Diabetes Maternal Grandmother     Heart disease Maternal Grandmother     Cancer Paternal Grandmother        Social History     Social History    Marital status:      Spouse name: N/A    Number of children: N/A    Years of education: N/A     Social History Main Topics    Smoking status: Never Smoker    Smokeless tobacco: Never Used    Alcohol use No    Drug use: No    Sexual activity: Yes     Partners: Male     Other  Topics Concern    None     Social History Narrative    None       Current Outpatient Prescriptions   Medication Sig Dispense Refill    acetaminophen (TYLENOL) 325 MG tablet Take 325 mg by mouth every 6 (six) hours as needed for Pain (headache).      albuterol 90 mcg/actuation inhaler Inhale 2 puffs into the lungs 4 (four) times daily. (Patient taking differently: Inhale 2 puffs into the lungs every 4 (four) hours as needed. ) 1 Inhaler 1    ascorbic acid, vitamin C, (VITAMIN C) 500 MG tablet Take 1 tablet (500 mg total) by mouth every evening.      aspirin (ECOTRIN) 81 MG EC tablet Take 1 tablet (81 mg total) by mouth once daily.      atorvastatin (LIPITOR) 80 MG tablet Take 1 tablet (80 mg total) by mouth once daily. 90 tablet 3    blood sugar diagnostic (ACCU-CHEK KAYLIE PLUS TEST STRP) Strp 1 strip by Misc.(Non-Drug; Combo Route) route 3 (three) times daily. 300 strip 3    doxazosin (CARDURA) 4 MG tablet Take 1 tablet (4 mg total) by mouth once daily. 30 tablet 11    fenofibrate 160 MG Tab Take 1 tablet (160 mg total) by mouth once daily. 90 tablet 3    ferrous sulfate 325 (65 FE) MG EC tablet Take 1 tablet (325 mg total) by mouth 2 (two) times daily with meals.  0    gabapentin (NEURONTIN) 300 MG capsule Take 1 capsule (300 mg total) by mouth every evening. 90 capsule 3    glipiZIDE (GLUCOTROL) 5 MG TR24 Take 1 tablet (5 mg total) by mouth 2 times daily 2 hours after meal. 180 tablet 3    montelukast (SINGULAIR) 10 mg tablet Take 1 tablet (10 mg total) by mouth every evening. 90 tablet 1    furosemide (LASIX) 20 MG tablet Take 1 tablet (20 mg total) by mouth every 72 hours. (Patient taking differently: Take 20 mg by mouth once daily. )  0    lisinopril 10 MG tablet Take 1 tablet (10 mg total) by mouth once daily. 30 tablet 2    metoprolol tartrate (LOPRESSOR) 50 MG tablet Take 1 tablet (50 mg total) by mouth every evening. 180 tablet 1    NIFEdipine (PROCARDIA-XL) 90 MG (OSM) 24 hr tablet Take 1  tablet (90 mg total) by mouth once daily. 90 tablet 1     No current facility-administered medications for this visit.        Review of patient's allergies indicates:   Allergen Reactions    Dilaudid [hydromorphone (bulk)] Nausea And Vomiting     Severe GI upset    Lortab [hydrocodone-acetaminophen] Itching       Review of Systems   Constitutional: Negative for chills and fever.   HENT: Negative for sore throat.    Eyes: Negative for visual disturbance.   Respiratory: Negative for cough and shortness of breath.    Cardiovascular: Negative for chest pain and leg swelling.   Gastrointestinal: Negative for abdominal pain, blood in stool, constipation, diarrhea and vomiting.   Genitourinary: Negative for difficulty urinating, dysuria and hematuria.   Musculoskeletal: Negative for arthralgias and back pain.   Neurological: Negative for dizziness and weakness.       Objective:      Physical Exam   Constitutional: She appears well-developed and well-nourished. No distress.   Obese female in no acute distress.     HENT:   Head: Normocephalic and atraumatic.   Mouth/Throat: Oropharynx is clear and moist. No oropharyngeal exudate.   Eyes: Conjunctivae and EOM are normal.   Neck: Normal range of motion. Neck supple. No thyromegaly present.   Cardiovascular: Normal rate, regular rhythm and intact distal pulses.    Pulmonary/Chest: Effort normal. No respiratory distress.   Abdominal: Soft. She exhibits no distension and no mass. There is no tenderness.   Musculoskeletal: She exhibits no edema.   Lymphadenopathy:     She has no cervical adenopathy.   Neurological: She is alert.   Skin: Skin is warm. No rash noted. No erythema.   Psychiatric: She has a normal mood and affect. Her behavior is normal.   Vitals reviewed.      Assessment:       1. Hospital discharge follow-up    2. Hypertension associated with diabetes    3. Stage 5 chronic kidney disease not on chronic dialysis        Plan:       1. Hospital discharge  follow-up  Doing well since discharge.  BP med regimen adjusted by nephrology.   No changes on today.     2. Hypertension associated with diabetes  Monitor BP at home and RTC/ED in case BP significantly elevated.   Renal managing HTN meds. Visit [facility] with them on 6/28/18, per pt.    3. Stage 5 chronic kidney disease not on chronic dialysis  Patient is followed by Nephrologist for this condition. No changes to management of this condition on today.     Patient readiness: acceptance and barriers:none    During the course of the visit the patient was educated and counseled about the following:     Hypertension:   Dietary sodium restriction.  Regular aerobic exercise.  Obesity:   Informal exercise measures discussed, e.g. taking stairs instead of elevator.  Regular aerobic exercise program discussed.    Goals: Hypertension: Reduce Blood Pressure and Obesity: Reduce calorie intake and BMI    Did patient meet goals/outcomes: No    The following self management tools provided: blood pressure log  excercise log    Patient Instructions (the written plan) was given to the patient/family.     Time spent with patient: 15 minutes    Barriers to medications present (no )    Adverse reactions to current medications (no)    Over the counter medications reviewed (Yes)    Portions of this note were created using Dragon voice recognition software. There may be voice recognition errors found in the text, and attempts were made to correct these errors prior to signature    Luis Hassan MD    Family Medicine  5/18/2018

## 2018-05-22 ENCOUNTER — TELEPHONE (OUTPATIENT)
Dept: CARDIOLOGY | Facility: CLINIC | Age: 53
End: 2018-05-22

## 2018-05-22 LAB
CHOLESTEROL, TOTAL: 234
HDLC SERPL-MCNC: 47 MG/DL
LDLC SERPL CALC-MCNC: 159 MG/DL
TRIGLYCERIDE (LIPID PAN): 150

## 2018-05-22 NOTE — TELEPHONE ENCOUNTER
----- Message from Ana Correa sent at 5/22/2018  8:56 AM CDT -----  Contact: Vicky [phone] [facility]  Vicky wants to know if the labs ordered by doctor Mason can be drawn by home health?    Thanks

## 2018-05-25 ENCOUNTER — PATIENT MESSAGE (OUTPATIENT)
Dept: CARDIOLOGY | Facility: CLINIC | Age: 53
End: 2018-05-25

## 2018-05-25 DIAGNOSIS — E78.00 PURE HYPERCHOLESTEROLEMIA: Primary | ICD-10-CM

## 2018-05-25 RX ORDER — EZETIMIBE 10 MG/1
10 TABLET ORAL DAILY
Qty: 90 TABLET | Refills: 3 | Status: SHIPPED | OUTPATIENT
Start: 2018-05-25 | End: 2018-06-26 | Stop reason: SDUPTHER

## 2018-05-29 ENCOUNTER — TELEPHONE (OUTPATIENT)
Dept: CARDIOLOGY | Facility: CLINIC | Age: 53
End: 2018-05-29

## 2018-05-29 NOTE — TELEPHONE ENCOUNTER
,        LOV:4/11/18.Vicky, nurse said that the pt is coming in tomorrow to see you but she wanted you to know that the pt has gained 4 lbs in 1 week but the lungs are clear and has no SOB.She does have swelling to both LE's.She did say that the pt reports that she has not been following a low salt diet.Please advise.ThanksGenoveva

## 2018-05-29 NOTE — TELEPHONE ENCOUNTER
----- Message from Willard Hobbs sent at 5/29/2018  4:16 PM CDT -----  Contact: Vicky/[employer]  Please call Vicky at [phone]. Patient has a 4 lbs weight gain in 1 week, swelling in both legs, lungs clear no SOB. Patient has an appt with Dr Mason tomorrow 05/30/18. Last seen Dr Mason 04/11/18    Thank you

## 2018-05-30 ENCOUNTER — OFFICE VISIT (OUTPATIENT)
Dept: CARDIOLOGY | Facility: CLINIC | Age: 53
End: 2018-05-30
Payer: MEDICARE

## 2018-05-30 VITALS
BODY MASS INDEX: 30.21 KG/M2 | WEIGHT: 204 LBS | DIASTOLIC BLOOD PRESSURE: 71 MMHG | TEMPERATURE: 98 F | HEART RATE: 53 BPM | SYSTOLIC BLOOD PRESSURE: 130 MMHG | HEIGHT: 69 IN

## 2018-05-30 DIAGNOSIS — N18.4 CKD STAGE 4 DUE TO TYPE 2 DIABETES MELLITUS: ICD-10-CM

## 2018-05-30 DIAGNOSIS — I15.2 HYPERTENSION ASSOCIATED WITH DIABETES: Primary | ICD-10-CM

## 2018-05-30 DIAGNOSIS — E11.59 HYPERTENSION ASSOCIATED WITH DIABETES: Primary | ICD-10-CM

## 2018-05-30 DIAGNOSIS — E11.22 CKD STAGE 4 DUE TO TYPE 2 DIABETES MELLITUS: ICD-10-CM

## 2018-05-30 DIAGNOSIS — Z91.89 CARDIOVASCULAR EVENT RISK: ICD-10-CM

## 2018-05-30 PROCEDURE — 99214 OFFICE O/P EST MOD 30 MIN: CPT | Mod: S$GLB,,, | Performed by: INTERNAL MEDICINE

## 2018-05-30 PROCEDURE — 3008F BODY MASS INDEX DOCD: CPT | Mod: CPTII,S$GLB,, | Performed by: INTERNAL MEDICINE

## 2018-05-30 PROCEDURE — 3045F PR MOST RECENT HEMOGLOBIN A1C LEVEL 7.0-9.0%: CPT | Mod: CPTII,S$GLB,, | Performed by: INTERNAL MEDICINE

## 2018-05-30 PROCEDURE — 3078F DIAST BP <80 MM HG: CPT | Mod: CPTII,S$GLB,, | Performed by: INTERNAL MEDICINE

## 2018-05-30 PROCEDURE — 99999 PR PBB SHADOW E&M-EST. PATIENT-LVL IV: CPT | Mod: PBBFAC,,, | Performed by: INTERNAL MEDICINE

## 2018-05-30 PROCEDURE — 3075F SYST BP GE 130 - 139MM HG: CPT | Mod: CPTII,S$GLB,, | Performed by: INTERNAL MEDICINE

## 2018-05-30 RX ORDER — DOXAZOSIN 4 MG/1
4 TABLET ORAL EVERY 12 HOURS
Qty: 60 TABLET | Refills: 11 | Status: SHIPPED | OUTPATIENT
Start: 2018-05-30 | End: 2018-10-23 | Stop reason: ALTCHOICE

## 2018-05-30 RX ORDER — FUROSEMIDE 20 MG/1
20 TABLET ORAL DAILY
COMMUNITY
End: 2019-03-14

## 2018-05-30 RX ORDER — PROCHLORPERAZINE MALEATE 10 MG
10 TABLET ORAL EVERY 6 HOURS PRN
COMMUNITY
End: 2018-10-23 | Stop reason: ALTCHOICE

## 2018-05-30 NOTE — PROGRESS NOTES
Subjective:    Patient ID:  Leanna García is a 52 y.o. female who presents for follow-up of HTN    HPI     The patient is a 52 year old female referred by Dr Hassan. She has a history of poorly controlled hypertension, severe hyperlipidemia, obese, CKD 4, DM II . She present today for follow up of hypertension . Her home BPs range 144-190 systolic There is questionable adherence to low salt.  Lab Results   Component Value Date     04/28/2018    K 4.9 04/28/2018     (H) 04/28/2018    CO2 20 (L) 04/28/2018    BUN 53 (H) 04/28/2018    CREATININE 3.5 (H) 04/28/2018     (H) 04/28/2018    HGBA1C 7.5 (H) 04/24/2018    MG 1.8 04/28/2018    AST 19 04/24/2018    ALT 16 04/24/2018    ALBUMIN 3.2 (L) 04/24/2018    PROT 7.5 04/24/2018    BILITOT 0.2 04/24/2018    WBC 8.70 04/28/2018    HGB 8.8 (L) 04/28/2018    HCT 26.2 (L) 04/28/2018    MCV 80 (L) 04/28/2018     04/28/2018    INR 1.0 10/14/2016    TSH 3.238 10/14/2016         Lab Results   Component Value Date    CHOL 257 (H) 05/17/2017    HDL 47 05/17/2017    TRIG 199 (H) 05/17/2017       Lab Results   Component Value Date    LDLCALC 170.2 (H) 05/17/2017       Past Medical History:   Diagnosis Date    A-fib     resolved per patient    Arthritis     Bronchitis     Cardiovascular event risk, ASCVD 10-year risk 6.7% 10/15/2016    Diabetes mellitus     Diabetes mellitus type II     Encounter for blood transfusion     History of colon polyps 11/2/2016    Hyperlipidemia     Hypertension     Stroke        Current Outpatient Prescriptions:     acetaminophen (TYLENOL) 325 MG tablet, Take 325 mg by mouth every 6 (six) hours as needed for Pain (headache)., Disp: , Rfl:     albuterol 90 mcg/actuation inhaler, Inhale 2 puffs into the lungs 4 (four) times daily. (Patient taking differently: Inhale 2 puffs into the lungs every 4 (four) hours as needed. ), Disp: 1 Inhaler, Rfl: 1    ascorbic acid, vitamin C, (VITAMIN C) 500 MG tablet, Take 1 tablet  (500 mg total) by mouth every evening., Disp: , Rfl:     aspirin (ECOTRIN) 81 MG EC tablet, Take 1 tablet (81 mg total) by mouth once daily., Disp: , Rfl:     atorvastatin (LIPITOR) 80 MG tablet, Take 1 tablet (80 mg total) by mouth once daily., Disp: 90 tablet, Rfl: 3    blood sugar diagnostic (ACCU-CHEK KAYLIE PLUS TEST STRP) Strp, 1 strip by Misc.(Non-Drug; Combo Route) route 3 (three) times daily., Disp: 300 strip, Rfl: 3    doxazosin (CARDURA) 4 MG tablet, Take 1 tablet (4 mg total) by mouth every 12 (twelve) hours., Disp: 60 tablet, Rfl: 11    ezetimibe (ZETIA) 10 mg tablet, Take 1 tablet (10 mg total) by mouth once daily., Disp: 90 tablet, Rfl: 3    fenofibrate 160 MG Tab, Take 1 tablet (160 mg total) by mouth once daily., Disp: 90 tablet, Rfl: 3    ferrous sulfate 325 (65 FE) MG EC tablet, Take 1 tablet (325 mg total) by mouth 2 (two) times daily with meals., Disp: , Rfl: 0    furosemide (LASIX) 20 MG tablet, Take 20 mg by mouth once daily., Disp: , Rfl:     gabapentin (NEURONTIN) 300 MG capsule, Take 1 capsule (300 mg total) by mouth every evening., Disp: 90 capsule, Rfl: 3    glipiZIDE (GLUCOTROL) 5 MG TR24, Take 1 tablet (5 mg total) by mouth 2 times daily 2 hours after meal., Disp: 180 tablet, Rfl: 3    metoprolol tartrate (LOPRESSOR) 50 MG tablet, Take 1 tablet (50 mg total) by mouth every evening., Disp: 180 tablet, Rfl: 1    montelukast (SINGULAIR) 10 mg tablet, Take 1 tablet (10 mg total) by mouth every evening., Disp: 90 tablet, Rfl: 1    NIFEdipine (PROCARDIA-XL) 90 MG (OSM) 24 hr tablet, Take 1 tablet (90 mg total) by mouth once daily., Disp: 90 tablet, Rfl: 1    prochlorperazine (COMPAZINE) 10 MG tablet, Take 10 mg by mouth every 6 (six) hours as needed., Disp: , Rfl:     lisinopril 10 MG tablet, Take 1 tablet (10 mg total) by mouth once daily., Disp: 30 tablet, Rfl: 2        Review of Systems   Constitution: Negative for decreased appetite, diaphoresis, fever, weakness,  "malaise/fatigue, weight gain and weight loss.   HENT: Negative for congestion, ear discharge, ear pain and nosebleeds.    Eyes: Negative for blurred vision, double vision and visual disturbance.   Cardiovascular: Negative for chest pain, claudication, cyanosis, dyspnea on exertion, irregular heartbeat, leg swelling, near-syncope, orthopnea, palpitations, paroxysmal nocturnal dyspnea and syncope.   Respiratory: Negative for cough, hemoptysis, shortness of breath, sleep disturbances due to breathing, snoring, sputum production and wheezing.    Endocrine: Negative for polydipsia, polyphagia and polyuria.   Hematologic/Lymphatic: Negative for adenopathy and bleeding problem. Does not bruise/bleed easily.   Skin: Negative for color change, nail changes, poor wound healing and rash.   Musculoskeletal: Negative for muscle cramps and muscle weakness.   Gastrointestinal: Negative for abdominal pain, anorexia, change in bowel habit, hematochezia, nausea and vomiting.   Genitourinary: Negative for dysuria, frequency and hematuria.   Neurological: Negative for brief paralysis, difficulty with concentration, excessive daytime sleepiness, dizziness, focal weakness, headaches, light-headedness, seizures and vertigo.   Psychiatric/Behavioral: Negative for altered mental status and depression.   Allergic/Immunologic: Negative for persistent infections.        Objective:/71 (BP Location: Left arm, Patient Position: Sitting, BP Method: Medium (Automatic))   Pulse (!) 53   Temp 97.8 °F (36.6 °C) (Oral)   Ht 5' 9" (1.753 m)   Wt 92.5 kg (204 lb)   BMI 30.13 kg/m²           Physical Exam   Constitutional: She is oriented to person, place, and time. She appears well-developed and well-nourished.   HENT:   Head: Normocephalic.   Right Ear: External ear normal.   Left Ear: External ear normal.   Nose: Nose normal.   Inspection of lips, teeth and gums normal   Eyes: Conjunctivae and EOM are normal. Pupils are equal, round, and " reactive to light. No scleral icterus.   Neck: Normal range of motion. No JVD present. No tracheal deviation present. No thyromegaly present.   Cardiovascular: Normal rate, regular rhythm, normal heart sounds and intact distal pulses.  Exam reveals no gallop and no friction rub.    No murmur heard.  Pulses:       Dorsalis pedis pulses are 0 on the right side, and 0 on the left side.        Posterior tibial pulses are 0 on the right side, and 0 on the left side.   Pulmonary/Chest: Effort normal and breath sounds normal. No respiratory distress. She has no wheezes. She has no rales. She exhibits no tenderness.   Abdominal: Soft. Bowel sounds are normal. She exhibits no distension. There is no hepatosplenomegaly. There is no tenderness. There is no guarding.   Musculoskeletal: Normal range of motion. She exhibits no edema or tenderness.   Lymphadenopathy:   Palpation of lymph nodes of neck and groin normal   Neurological: She is oriented to person, place, and time. No cranial nerve deficit. She exhibits normal muscle tone. Coordination normal.   Skin: Skin is warm and dry. No rash noted. No erythema. No pallor.   Palpation of skin normal   Psychiatric: She has a normal mood and affect. Her behavior is normal. Judgment and thought content normal.         Assessment:       1. Hypertension associated with diabetes    2. Cardiovascular event risk, ASCVD 10-year risk 10.5%, 2015    3. Uncontrolled type 2 diabetes mellitus with stage 3 chronic kidney disease, without long-term current use of insulin    4. CKD stage 4 due to type 2 diabetes mellitus         Plan:       Leanna was seen today for follow-up.    Diagnoses and all orders for this visit:    Hypertension associated with diabetes  -     doxazosin (CARDURA) 4 MG tablet; Take 1 tablet (4 mg total) by mouth every 12 (twelve) hours.  -     Basic metabolic panel; Future; Expected date: 08/28/2018    Cardiovascular event risk, ASCVD 10-year risk 10.5%, 2015    Uncontrolled  type 2 diabetes mellitus with stage 3 chronic kidney disease, without long-term current use of insulin    CKD stage 4 due to type 2 diabetes mellitus  -     Basic metabolic panel; Future; Expected date: 08/28/2018    Other orders  -     furosemide (LASIX) 20 MG tablet; Take 20 mg by mouth once daily.  -     prochlorperazine (COMPAZINE) 10 MG tablet; Take 10 mg by mouth every 6 (six) hours as needed.

## 2018-05-31 ENCOUNTER — TELEPHONE (OUTPATIENT)
Dept: CARDIOLOGY | Facility: CLINIC | Age: 53
End: 2018-05-31

## 2018-05-31 NOTE — TELEPHONE ENCOUNTER
----- Message from Cristofer Mason MD sent at 5/31/2018  3:44 PM CDT -----  Contact: Vicky/[facility] care  B call her with result, Went over it in office. She is not to fast. PB  ----- Message -----  From: Chanel Ngo RN  Sent: 5/30/2018   4:27 PM  To: Cristofer Mason MD        ----- Message -----  From: Willard Hobbs  Sent: 5/30/2018   4:05 PM  To: Isabella ALLEN. Staff    Please call Vicky at [phone]. Patient would like to get her cholesterol test results  Last seen Dr Mason 05/30/18    Thank you

## 2018-06-05 ENCOUNTER — TELEPHONE (OUTPATIENT)
Dept: CARDIOLOGY | Facility: CLINIC | Age: 53
End: 2018-06-05

## 2018-06-05 NOTE — TELEPHONE ENCOUNTER
----- Message from Yareli Richards sent at 6/5/2018 10:26 AM CDT -----  Contact: Vicky from [facility]  Vicky from [facility] called pt would like to know the results of her cholesterol   test that was completed 5/22/18. Please call pt @ [phone]. Thank you.

## 2018-06-26 ENCOUNTER — TELEPHONE (OUTPATIENT)
Dept: CARDIOLOGY | Facility: CLINIC | Age: 53
End: 2018-06-26

## 2018-06-26 DIAGNOSIS — E78.00 PURE HYPERCHOLESTEROLEMIA: ICD-10-CM

## 2018-06-26 RX ORDER — EZETIMIBE 10 MG/1
10 TABLET ORAL DAILY
Qty: 90 TABLET | Refills: 3 | Status: SHIPPED | OUTPATIENT
Start: 2018-06-26 | End: 2018-10-23 | Stop reason: ALTCHOICE

## 2018-06-26 NOTE — TELEPHONE ENCOUNTER
,         LOV:5/30/18.Just Devan nurse w/ [facility],said that the pt's b/p was very elevated today but before she checked her b/p she started to talk about all the family stress issues she has been having.When she took her b/p it was 200/90,p=72.Before she left b/p =188/90 and then she had the pt relax and take deep breaths and she had the pt call her an hour later and her b/p=154/79.Thanks,Genoveva

## 2018-06-26 NOTE — TELEPHONE ENCOUNTER
----- Message from Jenny Frankel sent at 6/26/2018 11:18 AM CDT -----  Contact: Vicky-[facility]   Vicky is in the home of the Pt and she is calling to report the Pt's BP is 200/90 p72. She has no symptoms except anxiety BP is now 188/90  P72. The New medication is Sodium Bycarbonate 650mg TID per Dr. Carlson. Please call her back @ [phone]. Thanks, Jenny  5/30/2018 Dr. Mason

## 2018-06-27 ENCOUNTER — TELEPHONE (OUTPATIENT)
Dept: ADMINISTRATIVE | Facility: CLINIC | Age: 53
End: 2018-06-27

## 2018-06-27 NOTE — PROGRESS NOTES
Home Health recert with [facility] -Dr. Cristofer Mason. Pt. Received  services.

## 2018-06-29 ENCOUNTER — TELEPHONE (OUTPATIENT)
Dept: CARDIOLOGY | Facility: CLINIC | Age: 53
End: 2018-06-29

## 2018-06-29 NOTE — TELEPHONE ENCOUNTER
----- Message from Astrid Lazaro sent at 6/29/2018 11:06 AM CDT -----  Contact: Patient  Type: Needs Medical Advice    Who Called:  Leanna patient  Symptoms (please be specific):  N/A  How long has patient had these symptoms:  N/A  Pharmacy name and phone #:    [facility] [address]  [address] [address]  Phone: [phone] Fax: [phone]  Best Call Back Number: [phone]  Additional Information: Calling because patient's has to pay $77 for Rx ezetimibe (ZETIA) 10 mg tablet, is there something less expense she can take. Please advise. Thanks.

## 2018-07-10 ENCOUNTER — TELEPHONE (OUTPATIENT)
Dept: CARDIOLOGY | Facility: CLINIC | Age: 53
End: 2018-07-10

## 2018-07-10 NOTE — TELEPHONE ENCOUNTER
----- Message from Cristofer Mason MD sent at 7/10/2018  6:21 AM CDT -----  Contact: Patient  Have her take 1/2 tab. 5 mg 80% as effective as 10 MG  ----- Message -----  From: Claudia Gerardo MA  Sent: 7/9/2018   2:36 PM  To: Cristofer Mason MD        ----- Message -----  From: Astrid Lazaro  Sent: 7/9/2018   2:19 PM  To: Isabella WHITE Staff    Type: Needs Medical Advice    Who Called:  Leanna patient  Symptoms (please be specific):  N/A  How long has patient had these symptoms:  N/A  Pharmacy name and phone #:    [facility] - [place], [place] - [address]  [address] [address]  Phone: [phone] Fax: [phone]  Best Call Back Number: [phone]  Additional Information: Calling because Rx ezetimibe (ZETIA) 10 mg tablet will cost her $75 she can not afford it. Could something else be prescribed that is cheaper. Please advise. Thanks.

## 2018-07-18 ENCOUNTER — TELEPHONE (OUTPATIENT)
Dept: FAMILY MEDICINE | Facility: CLINIC | Age: 53
End: 2018-07-18

## 2018-07-18 NOTE — TELEPHONE ENCOUNTER
----- Message from Christy Valdivia LPN sent at 7/17/2018  4:53 PM CDT -----  Contact: Patient      ----- Message -----  From: Gabriella Sprague  Sent: 7/17/2018   4:47 PM  To: Mo GREENE Staff    Vicky with [facility] called to advise the patient was discharged from home care today, 7/17/18.  She advised that the insurance company has not authorized anymore visits.  She advised the patient is also not home bound.  Please call Vicky back at [phone] with any questions.

## 2018-08-15 ENCOUNTER — TELEPHONE (OUTPATIENT)
Dept: FAMILY MEDICINE | Facility: CLINIC | Age: 53
End: 2018-08-15

## 2018-08-27 ENCOUNTER — HOSPITAL ENCOUNTER (EMERGENCY)
Facility: HOSPITAL | Age: 53
Discharge: ANOTHER HOSPITAL | End: 2018-08-28
Attending: EMERGENCY MEDICINE
Payer: MEDICARE

## 2018-08-27 DIAGNOSIS — R79.89 ELEVATED TROPONIN: Primary | ICD-10-CM

## 2018-08-27 DIAGNOSIS — N39.0 ACUTE UTI: ICD-10-CM

## 2018-08-27 DIAGNOSIS — I50.9 ACUTE CONGESTIVE HEART FAILURE, UNSPECIFIED HEART FAILURE TYPE: ICD-10-CM

## 2018-08-27 DIAGNOSIS — N18.4 STAGE 4 CHRONIC KIDNEY DISEASE: ICD-10-CM

## 2018-08-27 DIAGNOSIS — R07.9 CHEST PAIN: ICD-10-CM

## 2018-08-27 DIAGNOSIS — R11.10 VOMITING: ICD-10-CM

## 2018-08-27 PROCEDURE — 96367 TX/PROPH/DG ADDL SEQ IV INF: CPT

## 2018-08-27 PROCEDURE — 25000003 PHARM REV CODE 250: Performed by: EMERGENCY MEDICINE

## 2018-08-27 PROCEDURE — 96361 HYDRATE IV INFUSION ADD-ON: CPT

## 2018-08-27 PROCEDURE — 93005 ELECTROCARDIOGRAM TRACING: CPT

## 2018-08-27 PROCEDURE — 96365 THER/PROPH/DIAG IV INF INIT: CPT

## 2018-08-27 PROCEDURE — 63600175 PHARM REV CODE 636 W HCPCS: Performed by: EMERGENCY MEDICINE

## 2018-08-27 PROCEDURE — 99285 EMERGENCY DEPT VISIT HI MDM: CPT | Mod: 25

## 2018-08-27 PROCEDURE — 96375 TX/PRO/DX INJ NEW DRUG ADDON: CPT

## 2018-08-27 RX ORDER — DICYCLOMINE HYDROCHLORIDE 10 MG/1
20 CAPSULE ORAL
Status: COMPLETED | OUTPATIENT
Start: 2018-08-27 | End: 2018-08-27

## 2018-08-27 RX ADMIN — SODIUM CHLORIDE 500 ML: 0.9 INJECTION, SOLUTION INTRAVENOUS at 11:08

## 2018-08-27 RX ADMIN — PROMETHAZINE HYDROCHLORIDE 12.5 MG: 25 INJECTION INTRAMUSCULAR; INTRAVENOUS at 11:08

## 2018-08-27 RX ADMIN — DICYCLOMINE HYDROCHLORIDE 20 MG: 10 CAPSULE ORAL at 11:08

## 2018-08-28 ENCOUNTER — OUTSIDE PLACE OF SERVICE (OUTPATIENT)
Dept: CARDIOLOGY | Facility: CLINIC | Age: 53
End: 2018-08-28
Payer: MEDICARE

## 2018-08-28 VITALS
BODY MASS INDEX: 30.31 KG/M2 | HEIGHT: 68 IN | HEART RATE: 57 BPM | OXYGEN SATURATION: 93 % | TEMPERATURE: 98 F | RESPIRATION RATE: 20 BRPM | DIASTOLIC BLOOD PRESSURE: 73 MMHG | WEIGHT: 200 LBS | SYSTOLIC BLOOD PRESSURE: 167 MMHG

## 2018-08-28 LAB
ALBUMIN SERPL BCP-MCNC: 3.4 G/DL
ALP SERPL-CCNC: 106 U/L
ALT SERPL W/O P-5'-P-CCNC: 12 U/L
ANION GAP SERPL CALC-SCNC: 13 MMOL/L
AST SERPL-CCNC: 19 U/L
BACTERIA #/AREA URNS HPF: ABNORMAL /HPF
BASOPHILS NFR BLD: 0 %
BILIRUB SERPL-MCNC: 0.4 MG/DL
BILIRUB UR QL STRIP: NEGATIVE
BNP SERPL-MCNC: 477 PG/ML
BUN SERPL-MCNC: 37 MG/DL
CALCIUM SERPL-MCNC: 9.5 MG/DL
CHLORIDE SERPL-SCNC: 106 MMOL/L
CLARITY UR: ABNORMAL
CO2 SERPL-SCNC: 19 MMOL/L
COLOR UR: YELLOW
CREAT SERPL-MCNC: 2.9 MG/DL
DIFFERENTIAL METHOD: ABNORMAL
EOSINOPHIL NFR BLD: 1 %
ERYTHROCYTE [DISTWIDTH] IN BLOOD BY AUTOMATED COUNT: 12.4 %
EST. GFR  (AFRICAN AMERICAN): 21 ML/MIN/1.73 M^2
EST. GFR  (NON AFRICAN AMERICAN): 18 ML/MIN/1.73 M^2
GLUCOSE SERPL-MCNC: 190 MG/DL
GLUCOSE UR QL STRIP: ABNORMAL
HCT VFR BLD AUTO: 27.1 %
HGB BLD-MCNC: 9.5 G/DL
HGB UR QL STRIP: ABNORMAL
HYALINE CASTS #/AREA URNS LPF: 0 /LPF
KETONES UR QL STRIP: NEGATIVE
LACTATE SERPL-SCNC: 0.9 MMOL/L
LEUKOCYTE ESTERASE UR QL STRIP: ABNORMAL
LYMPHOCYTES NFR BLD: 12 %
MAGNESIUM SERPL-MCNC: 2.2 MG/DL
MCH RBC QN AUTO: 27.5 PG
MCHC RBC AUTO-ENTMCNC: 34.9 G/DL
MCV RBC AUTO: 79 FL
MICROSCOPIC COMMENT: ABNORMAL
MONOCYTES NFR BLD: 4 %
NEUTROPHILS NFR BLD: 80 %
NEUTS BAND NFR BLD MANUAL: 3 %
NITRITE UR QL STRIP: NEGATIVE
PH UR STRIP: 6 [PH] (ref 5–8)
PLATELET # BLD AUTO: 297 K/UL
PMV BLD AUTO: 8.5 FL
POTASSIUM SERPL-SCNC: 5.1 MMOL/L
PROT SERPL-MCNC: 7.3 G/DL
PROT UR QL STRIP: ABNORMAL
RBC # BLD AUTO: 3.44 M/UL
RBC #/AREA URNS HPF: 50 /HPF (ref 0–4)
SODIUM SERPL-SCNC: 138 MMOL/L
SP GR UR STRIP: 1.02 (ref 1–1.03)
SQUAMOUS #/AREA URNS HPF: 2 /HPF
TROPONIN I SERPL DL<=0.01 NG/ML-MCNC: 0.06 NG/ML
TROPONIN I SERPL DL<=0.01 NG/ML-MCNC: 0.09 NG/ML
URN SPEC COLLECT METH UR: ABNORMAL
UROBILINOGEN UR STRIP-ACNC: NEGATIVE EU/DL
WBC # BLD AUTO: 13.1 K/UL
WBC #/AREA URNS HPF: 40 /HPF (ref 0–5)

## 2018-08-28 PROCEDURE — 87186 SC STD MICRODIL/AGAR DIL: CPT

## 2018-08-28 PROCEDURE — 87077 CULTURE AEROBIC IDENTIFY: CPT

## 2018-08-28 PROCEDURE — 99223 1ST HOSP IP/OBS HIGH 75: CPT | Mod: ,,, | Performed by: INTERNAL MEDICINE

## 2018-08-28 PROCEDURE — 85025 COMPLETE CBC W/AUTO DIFF WBC: CPT

## 2018-08-28 PROCEDURE — 96375 TX/PRO/DX INJ NEW DRUG ADDON: CPT

## 2018-08-28 PROCEDURE — 81000 URINALYSIS NONAUTO W/SCOPE: CPT

## 2018-08-28 PROCEDURE — 36415 COLL VENOUS BLD VENIPUNCTURE: CPT

## 2018-08-28 PROCEDURE — 96367 TX/PROPH/DG ADDL SEQ IV INF: CPT

## 2018-08-28 PROCEDURE — 83880 ASSAY OF NATRIURETIC PEPTIDE: CPT

## 2018-08-28 PROCEDURE — 80053 COMPREHEN METABOLIC PANEL: CPT

## 2018-08-28 PROCEDURE — 83735 ASSAY OF MAGNESIUM: CPT

## 2018-08-28 PROCEDURE — 63600175 PHARM REV CODE 636 W HCPCS: Performed by: EMERGENCY MEDICINE

## 2018-08-28 PROCEDURE — 87086 URINE CULTURE/COLONY COUNT: CPT

## 2018-08-28 PROCEDURE — 63600175 PHARM REV CODE 636 W HCPCS

## 2018-08-28 PROCEDURE — 84484 ASSAY OF TROPONIN QUANT: CPT | Mod: 91

## 2018-08-28 PROCEDURE — 83605 ASSAY OF LACTIC ACID: CPT

## 2018-08-28 PROCEDURE — 87088 URINE BACTERIA CULTURE: CPT

## 2018-08-28 PROCEDURE — 84484 ASSAY OF TROPONIN QUANT: CPT

## 2018-08-28 PROCEDURE — 93306 TTE W/DOPPLER COMPLETE: CPT | Mod: 26,,, | Performed by: INTERNAL MEDICINE

## 2018-08-28 RX ORDER — ACETAMINOPHEN 10 MG/ML
1000 INJECTION, SOLUTION INTRAVENOUS EVERY 8 HOURS
Status: DISCONTINUED | OUTPATIENT
Start: 2018-08-28 | End: 2018-08-28 | Stop reason: HOSPADM

## 2018-08-28 RX ORDER — ACETAMINOPHEN 10 MG/ML
INJECTION, SOLUTION INTRAVENOUS
Status: COMPLETED
Start: 2018-08-28 | End: 2018-08-28

## 2018-08-28 RX ORDER — CEFTRIAXONE 1 G/50ML
1 INJECTION, SOLUTION INTRAVENOUS
Status: COMPLETED | OUTPATIENT
Start: 2018-08-28 | End: 2018-08-28

## 2018-08-28 RX ORDER — ASPIRIN 325 MG
325 TABLET, DELAYED RELEASE (ENTERIC COATED) ORAL
Status: DISCONTINUED | OUTPATIENT
Start: 2018-08-28 | End: 2018-08-28 | Stop reason: HOSPADM

## 2018-08-28 RX ORDER — ONDANSETRON 2 MG/ML
4 INJECTION INTRAMUSCULAR; INTRAVENOUS
Status: COMPLETED | OUTPATIENT
Start: 2018-08-28 | End: 2018-08-28

## 2018-08-28 RX ADMIN — ACETAMINOPHEN 1000 MG: 10 INJECTION, SOLUTION INTRAVENOUS at 01:08

## 2018-08-28 RX ADMIN — CEFTRIAXONE 1 G: 1 INJECTION, SOLUTION INTRAVENOUS at 03:08

## 2018-08-28 RX ADMIN — ONDANSETRON 4 MG: 2 INJECTION INTRAMUSCULAR; INTRAVENOUS at 01:08

## 2018-08-28 NOTE — ED NOTES
Patient now accepted in transfer to [facility] ED by Dr. TONY Bhakta, call report to:  [phone].  Transfer Center arranging transport.

## 2018-08-28 NOTE — ED PROVIDER NOTES
Encounter Date: 8/27/2018    SCRIBE #1 NOTE: I, Issa Guerra, am scribing for, and in the presence of, Dr. Pradhan.       History     Chief Complaint   Patient presents with    Chest Pain     Pt took 324mg ASA at home.     Vomiting     Vomited 5 or 6 times prior to arrival.      08/27/2018 11:21 PM    The pt is a 53 y.o. Female arriving via EMS with a past medical history of A-fib, bronchitis, DM, HLD, HTN, and CVA presenting to the ED with a sudden onset of acute chest pain beginning 4 hrs ago. The pt described the chest pain as a pressure pain. Associated symptoms of vomiting x6, congestion, abdominal pain, leg swelling, and cough. The pt noted possible sick contact. She noted taking 324mg ASA at home with no improvement of symptoms. The pt denied diarrhea. She has no history of cigarette smoking. The pt has no pertinent past surgical history.      The history is provided by the patient.     Review of patient's allergies indicates:   Allergen Reactions    Dilaudid [hydromorphone (bulk)] Nausea And Vomiting     Severe GI upset    Lortab [hydrocodone-acetaminophen] Itching     Past Medical History:   Diagnosis Date    A-fib     resolved per patient    Arthritis     Bronchitis     Cardiovascular event risk, ASCVD 10-year risk 6.7% 10/15/2016    Diabetes mellitus     Diabetes mellitus type II     Encounter for blood transfusion     History of colon polyps 11/2/2016    Hyperlipidemia     Hypertension     Stroke      Past Surgical History:   Procedure Laterality Date    HERNIA REPAIR Bilateral 11/22/2016    inguinal    HYSTERECTOMY       Family History   Problem Relation Age of Onset    Hyperlipidemia Mother     Hypertension Mother     Hypertension Father     Diabetes Father     Diabetes Sister     Diabetes Sister     Diabetes Maternal Aunt     Diabetes Maternal Grandmother     Heart disease Maternal Grandmother     Cancer Paternal Grandmother      Social History     Tobacco Use    Smoking  status: Never Smoker    Smokeless tobacco: Never Used   Substance Use Topics    Alcohol use: No    Drug use: No     Review of Systems   Constitutional: Negative for fever.   HENT: Positive for congestion.    Eyes: Negative for visual disturbance.   Respiratory: Positive for cough. Negative for wheezing.    Cardiovascular: Positive for chest pain and leg swelling.   Gastrointestinal: Positive for abdominal pain and vomiting. Negative for diarrhea.   Genitourinary: Negative for dysuria.   Musculoskeletal: Negative for joint swelling.   Skin: Negative for rash.   Neurological: Negative for syncope.   Hematological: Does not bruise/bleed easily.   Psychiatric/Behavioral: Negative for confusion.       Physical Exam     Initial Vitals [08/27/18 2323]   BP Pulse Resp Temp SpO2   (!) 235/95 87 20 97.7 °F (36.5 °C) (!) 91 %      MAP       --         Physical Exam    Nursing note and vitals reviewed.  Constitutional: She appears well-nourished.   HENT:   Head: Normocephalic and atraumatic.   Eyes: Conjunctivae and EOM are normal.   Neck: Normal range of motion. Neck supple. No thyroid mass present.   Cardiovascular: Normal rate, regular rhythm and normal heart sounds. Exam reveals no gallop and no friction rub.    No murmur heard.  Pulmonary/Chest: Breath sounds normal. She has no wheezes. She has no rhonchi. She has no rales.   Abdominal: Soft. Normal appearance and bowel sounds are normal. There is generalized tenderness.   Neurological: She is alert and oriented to person, place, and time. She has normal strength. No cranial nerve deficit or sensory deficit.   Skin: Skin is warm and dry. No rash noted. No erythema.   Psychiatric: She has a normal mood and affect. Her speech is normal. Cognition and memory are normal.         ED Course   Procedures  Labs Reviewed   B-TYPE NATRIURETIC PEPTIDE   CBC W/ AUTO DIFFERENTIAL   COMPREHENSIVE METABOLIC PANEL   TROPONIN I   URINALYSIS, REFLEX TO URINE CULTURE   MAGNESIUM   LACTIC  ACID, PLASMA     EKG Readings: (Independently Interpreted)   Initial Reading: No STEMI. Rhythm: Normal Sinus Rhythm. Heart Rate: 81. Ectopy: No Ectopy. Conduction: Normal. ST Segments: Normal ST Segments. T Waves: Normal. Axis: Normal.   Normal intervals       Imaging Results    None          Medical Decision Making:   History:   Old Medical Records: I decided to obtain old medical records.  Clinical Tests:   Lab Tests: Reviewed and Ordered  Radiological Study: Reviewed and Ordered  Medical Tests: Ordered and Reviewed  ED Management:  This patient was interviewed and examined emergently.  Initial vital signs significant for hypertension.  Patient has multiple complaints including abdominal pain and chest pressure with additional historical and physical examination elements consistent with progressing fluid overload.  EKG is negative for acute ischemic changes.  Initial lab significant for leukocytosis greater than 13,000 with stable end-organ function and normal lactic acid and stable pressures.  Urinalysis does indicate the presence of infection with many bacteria, WBCs and trace leukocytes. Urine culture pending. Patient was provided IV Rocephin.  Initial troponin is elevated at 0.55 with a further 3 hr value elevating to 0.88.  BNP is elevated to 500 with mild edema on chest x-ray. Case was discussed with Dr. Parry who recommended transfer to [facility] for further trending as well as potential nephrology consultation should the patient require cardiac intervention and additionally need dialysis.  This disposition was discussed with the patient and  who are in agreement with transfer.  Patient was accepted by the on-call emergency medicine physician at the ER there, Dr. Bhakta.  Patient was transferred per EMS.  She took a full aspirin prior to arrival tonight.            Scribe Attestation:   Scribe #1: I performed the above scribed service and the documentation accurately describes the services I performed. I  attest to the accuracy of the note.    Attending Attestation:         Attending Critical Care:   Critical Care Times:   Direct Patient Care (initial evaluation, reassessments, and time considering the case)................................................................25 minutes.   Additional History from reviewing old medical records or taking additional history from the family, EMS, PCP, etc.......................10 minutes.   Ordering, Reviewing, and Interpreting Diagnostic Studies...............................................................................................................5 minutes.   Documentation..................................................................................................................................................................................5 minutes.   Consultation with other Physicians. .................................................................................................................................................10 minutes.   Consultation with the patient's family directly relating to the patient's condition, care, and DNR status (when patient unable)......10 minutes.   Other..................................................................................................................................................................................................5 minutes.   ==============================================================  · Total Critical Care Time - exclusive of procedural time: 70 minutes.  ==============================================================  Critical care was necessary to treat or prevent imminent or life-threatening deterioration of the following conditions: myocardial infarction and congestive heart failure.   The following critical care procedures were done by me (see procedure notes): pulse oximetry.   Critical care was time spent personally by me on the following activities: obtaining history from patient  or relative, examination of patient, review of old charts, ordering lab, x-rays, and/or EKG, development of treatment plan with patient or relative, ordering and performing treatments and interventions, discussions with primary provider, discussion with consultants, interpretation of cardiac measurements, re-evaluation of patient's conition and evaluation of patient's response to treatment.   Critical Care Condition: potentially life-threatening       I, Dr. Davi Pradhan, personally performed the services described in this documentation. All medical record entries made by the scribe were at my direction and in my presence.  I have reviewed the chart and agree that the record reflects my personal performance and is accurate and complete. Davi Pradhan MD.  4:50 AM 08/28/2018             Clinical Impression:   The primary encounter diagnosis was Elevated troponin. Diagnoses of Chest pain, Vomiting, Acute UTI, Acute congestive heart failure, unspecified heart failure type, and Stage 4 chronic kidney disease were also pertinent to this visit.      Disposition:   Disposition: Transferred  Condition: Serious                        Davi Pradhan MD  08/28/18 0451

## 2018-08-29 ENCOUNTER — PATIENT OUTREACH (OUTPATIENT)
Dept: ADMINISTRATIVE | Facility: HOSPITAL | Age: 53
End: 2018-08-29

## 2018-08-29 ENCOUNTER — OUTSIDE PLACE OF SERVICE (OUTPATIENT)
Dept: CARDIOLOGY | Facility: CLINIC | Age: 53
End: 2018-08-29

## 2018-08-29 ENCOUNTER — OUTSIDE PLACE OF SERVICE (OUTPATIENT)
Dept: CARDIOLOGY | Facility: CLINIC | Age: 53
End: 2018-08-29
Payer: MEDICARE

## 2018-08-29 PROCEDURE — 99232 SBSQ HOSP IP/OBS MODERATE 35: CPT | Mod: ,,, | Performed by: INTERNAL MEDICINE

## 2018-08-30 ENCOUNTER — OUTSIDE PLACE OF SERVICE (OUTPATIENT)
Dept: CARDIOLOGY | Facility: CLINIC | Age: 53
End: 2018-08-30
Payer: MEDICARE

## 2018-08-30 LAB — BACTERIA UR CULT: NORMAL

## 2018-08-30 PROCEDURE — 99232 SBSQ HOSP IP/OBS MODERATE 35: CPT | Mod: ,,, | Performed by: INTERNAL MEDICINE

## 2018-08-31 ENCOUNTER — OUTSIDE PLACE OF SERVICE (OUTPATIENT)
Dept: CARDIOLOGY | Facility: CLINIC | Age: 53
End: 2018-08-31
Payer: MEDICARE

## 2018-08-31 PROCEDURE — 99232 SBSQ HOSP IP/OBS MODERATE 35: CPT | Mod: ,,, | Performed by: INTERNAL MEDICINE

## 2018-09-27 ENCOUNTER — TELEPHONE (OUTPATIENT)
Dept: CARDIOLOGY | Facility: CLINIC | Age: 53
End: 2018-09-27

## 2018-09-28 ENCOUNTER — TELEPHONE (OUTPATIENT)
Dept: CARDIOLOGY | Facility: CLINIC | Age: 53
End: 2018-09-28

## 2018-09-28 NOTE — TELEPHONE ENCOUNTER
----- Message from Cristofer Mason MD sent at 9/28/2018  6:35 AM CDT -----  Contact: Patient  This was on 8/28 and was transferred from [facility] to [facility] records of that admit. Get her permission to send for those records, P  ----- Message -----  From: Claudia Gerardo MA  Sent: 9/27/2018   2:52 PM  To: Cristofer Mason MD    Please review hospital notes.  ----- Message -----  From: Jenny Frankel  Sent: 9/27/2018   1:27 PM  To: Isabella WHITE Staff    Claudia, The Pt is calling because she needs to talk  to you about her resent  ER visit. She wants you to get her blood work and notes because they said that she had a heart attack and then after she took the EKG they said she didn't and then said that she had a small one. . Please call the patient @ [phone]. Thanks, Jenny

## 2018-10-09 ENCOUNTER — TELEPHONE (OUTPATIENT)
Dept: CARDIOLOGY | Facility: CLINIC | Age: 53
End: 2018-10-09

## 2018-10-09 NOTE — TELEPHONE ENCOUNTER
----- Message from Yareli Richards sent at 10/9/2018 11:55 AM CDT -----  Contact: Pt called   Pt has a questions regarding medications. Last visit 5/30/18 Dr. Mason. Please call @ [phone]. Thank you.

## 2018-10-09 NOTE — TELEPHONE ENCOUNTER
,        LOV:5/30/18.The pt and her sister called to report that the pt had been at [facility] and then spent 20 days at [facility].They added 3 new Rx's to her medication regimen and she only has 5 days worth and is asking for new Rx's to be sent to her local [facility] for a week supply and then to her mail order pharmacy if you are in agreement that she continue these Rx's.New Rx's are :Clopigigrel (Plavix) 75 mg 1 tab QD,Imdur 60 mg take 1 tab Q AM,as well as Folbic take 1 tab QD.She is also already taking the Nifedipine 90 mg 1 tab QD.Pt and sister aware that you are out until 10/12/18.Please advise.Thanks,Genoveva   as per discussions with patients cardiologist at [facility], suspect nausea and abdominal pain associated with RHF and possibly gastroparesis  reglan prn  cont ppi/carafate as per discussions with patients cardiologist at [facility], suspect nausea and abdominal pain associated with RHF and possibly gastroparesis  cont ppi/carafate  UGI Series pending

## 2018-10-12 ENCOUNTER — TELEPHONE (OUTPATIENT)
Dept: FAMILY MEDICINE | Facility: CLINIC | Age: 53
End: 2018-10-12

## 2018-10-12 RX ORDER — CLOPIDOGREL BISULFATE 75 MG/1
75 TABLET ORAL DAILY
COMMUNITY
End: 2018-10-16 | Stop reason: SDUPTHER

## 2018-10-12 RX ORDER — ERGOCALCIFEROL 1.25 MG/1
50000 CAPSULE ORAL
COMMUNITY
End: 2019-09-12 | Stop reason: ALTCHOICE

## 2018-10-12 RX ORDER — MINOXIDIL 2.5 MG/1
0.4 TABLET ORAL
COMMUNITY
Start: 2018-07-27 | End: 2018-10-23 | Stop reason: ALTCHOICE

## 2018-10-12 RX ORDER — HUMAN INSULIN 100 [IU]/ML
100 INJECTION, SOLUTION SUBCUTANEOUS 3 TIMES DAILY PRN
COMMUNITY
Start: 2018-10-11 | End: 2018-10-23 | Stop reason: ALTCHOICE

## 2018-10-12 RX ORDER — NITROGLYCERIN 0.4 MG/1
0.4 TABLET SUBLINGUAL
COMMUNITY
Start: 2018-10-11 | End: 2018-10-23 | Stop reason: SDUPTHER

## 2018-10-12 RX ORDER — ISOSORBIDE MONONITRATE 60 MG/1
60 TABLET, EXTENDED RELEASE ORAL DAILY
COMMUNITY
End: 2018-10-15 | Stop reason: SDUPTHER

## 2018-10-12 RX ORDER — INSULIN GLARGINE 100 [IU]/ML
15 INJECTION, SOLUTION SUBCUTANEOUS DAILY
COMMUNITY
Start: 2018-10-11 | End: 2018-10-23 | Stop reason: ALTCHOICE

## 2018-10-12 RX ORDER — NIFEDIPINE 90 MG/1
90 TABLET, EXTENDED RELEASE ORAL DAILY
COMMUNITY
Start: 2018-10-02 | End: 2018-10-23 | Stop reason: SDUPTHER

## 2018-10-12 RX ORDER — ACETAMINOPHEN 500 MG
500 TABLET ORAL EVERY 4 HOURS PRN
COMMUNITY
End: 2018-10-23 | Stop reason: SDUPTHER

## 2018-10-12 RX ORDER — METOPROLOL TARTRATE 25 MG/1
25 TABLET, FILM COATED ORAL 2 TIMES DAILY
COMMUNITY
End: 2018-10-23 | Stop reason: SDUPTHER

## 2018-10-12 RX ORDER — SYRING-NEEDL,DISP,INSUL,0.3 ML 29 G X1/2"
60 SYRINGE, EMPTY DISPOSABLE MISCELLANEOUS EVERY 12 HOURS
COMMUNITY
End: 2018-10-23 | Stop reason: ALTCHOICE

## 2018-10-12 RX ORDER — ASCORBIC ACID 500 MG
500 TABLET ORAL DAILY
COMMUNITY
End: 2018-10-23 | Stop reason: SDUPTHER

## 2018-10-12 RX ORDER — PANTOPRAZOLE SODIUM 40 MG/1
40 TABLET, DELAYED RELEASE ORAL DAILY
COMMUNITY
Start: 2018-10-11 | End: 2018-10-23 | Stop reason: SDUPTHER

## 2018-10-12 RX ORDER — ESCITALOPRAM OXALATE 5 MG/1
5 TABLET ORAL DAILY
COMMUNITY
Start: 2018-10-11 | End: 2018-10-23 | Stop reason: SDUPTHER

## 2018-10-15 RX ORDER — ISOSORBIDE MONONITRATE 60 MG/1
60 TABLET, EXTENDED RELEASE ORAL DAILY
Qty: 90 TABLET | Refills: 1 | Status: SHIPPED | OUTPATIENT
Start: 2018-10-15 | End: 2018-10-16 | Stop reason: SDUPTHER

## 2018-10-15 NOTE — TELEPHONE ENCOUNTER
----- Message from Yareli Richards sent at 10/15/2018  2:36 PM CDT -----  Contact: Pt called   Pt need a few pills for medication sisosorbide mononitrate (IMDUR) 60 MG 24 hr tablet   until her appt on 11/7/18 and send to [facility] - [facility] [address] [phone] (Phone)[phone] (Fax). Last visit /5/30/18 Dr. Mason. If need contact the pt @ [phone]. Thank you.

## 2018-10-16 RX ORDER — CLOPIDOGREL BISULFATE 75 MG/1
75 TABLET ORAL DAILY
Qty: 90 TABLET | Refills: 3 | Status: SHIPPED | OUTPATIENT
Start: 2018-10-16 | End: 2019-03-20 | Stop reason: SDUPTHER

## 2018-10-16 RX ORDER — CLOPIDOGREL BISULFATE 75 MG/1
75 TABLET ORAL DAILY
Qty: 90 TABLET | Refills: 1 | Status: SHIPPED | OUTPATIENT
Start: 2018-10-16 | End: 2018-10-23 | Stop reason: SDUPTHER

## 2018-10-16 RX ORDER — ISOSORBIDE MONONITRATE 60 MG/1
60 TABLET, EXTENDED RELEASE ORAL DAILY
Qty: 90 TABLET | Refills: 1 | Status: SHIPPED | OUTPATIENT
Start: 2018-10-16 | End: 2018-10-23 | Stop reason: ALTCHOICE

## 2018-10-16 RX ORDER — ISOSORBIDE MONONITRATE 60 MG/1
60 TABLET, EXTENDED RELEASE ORAL DAILY
Qty: 90 TABLET | Refills: 1 | Status: SHIPPED | OUTPATIENT
Start: 2018-10-16 | End: 2019-03-14 | Stop reason: SDUPTHER

## 2018-10-16 NOTE — TELEPHONE ENCOUNTER
----- Message from Ana Correa sent at 10/16/2018  1:19 PM CDT -----  Contact: pt  Pt need refills on the meds listed. Plavix 75 mg, Imdur 60 mg, Folic Acid and she don't know the mg or dosage for it. Please send to [facility], and she would like generic.  LOV 5/30/18 Dr. Mason    Thanks

## 2018-10-18 ENCOUNTER — TELEPHONE (OUTPATIENT)
Dept: CARDIOLOGY | Facility: CLINIC | Age: 53
End: 2018-10-18

## 2018-10-18 NOTE — TELEPHONE ENCOUNTER
----- Message from Aidee Gardner MA sent at 10/18/2018 10:39 AM CDT -----  Contact: Vicky Adams please call Vicky from [facility] she need to  Talk to you about the patient medication Plavix Imdur and  Folic Acid. Last visit was on 5- . Please call [phone]. Thank you.

## 2018-10-18 NOTE — TELEPHONE ENCOUNTER
Spoke with Vicky and informed her that prescriptions were e-scribed to [employer] on 10-16-18, and she verbalized understanding.

## 2018-10-23 ENCOUNTER — DOCUMENTATION ONLY (OUTPATIENT)
Dept: FAMILY MEDICINE | Facility: CLINIC | Age: 53
End: 2018-10-23

## 2018-10-23 ENCOUNTER — OFFICE VISIT (OUTPATIENT)
Dept: FAMILY MEDICINE | Facility: CLINIC | Age: 53
End: 2018-10-23
Payer: MEDICARE

## 2018-10-23 ENCOUNTER — HOSPITAL ENCOUNTER (OUTPATIENT)
Dept: RADIOLOGY | Facility: CLINIC | Age: 53
Discharge: HOME | End: 2018-10-23
Attending: PHYSICIAN ASSISTANT
Payer: MEDICARE

## 2018-10-23 VITALS
BODY MASS INDEX: 29.14 KG/M2 | HEART RATE: 56 BPM | TEMPERATURE: 98 F | WEIGHT: 192.25 LBS | SYSTOLIC BLOOD PRESSURE: 125 MMHG | DIASTOLIC BLOOD PRESSURE: 67 MMHG | HEIGHT: 68 IN

## 2018-10-23 DIAGNOSIS — E11.59 HYPERTENSION ASSOCIATED WITH DIABETES: Chronic | ICD-10-CM

## 2018-10-23 DIAGNOSIS — E11.21 DIABETES MELLITUS WITH NEPHROPATHY: ICD-10-CM

## 2018-10-23 DIAGNOSIS — K63.5 POLYP OF COLON, UNSPECIFIED PART OF COLON, UNSPECIFIED TYPE: ICD-10-CM

## 2018-10-23 DIAGNOSIS — Z99.2 STAGE 5 CHRONIC KIDNEY DISEASE ON CHRONIC DIALYSIS: Primary | ICD-10-CM

## 2018-10-23 DIAGNOSIS — E78.00 HYPERCHOLESTEREMIA: ICD-10-CM

## 2018-10-23 DIAGNOSIS — R93.89 ABNORMAL CXR: ICD-10-CM

## 2018-10-23 DIAGNOSIS — N18.6 STAGE 5 CHRONIC KIDNEY DISEASE ON CHRONIC DIALYSIS: Primary | ICD-10-CM

## 2018-10-23 DIAGNOSIS — Z12.39 BREAST CANCER SCREENING: ICD-10-CM

## 2018-10-23 DIAGNOSIS — I50.32 CHRONIC DIASTOLIC HEART FAILURE: ICD-10-CM

## 2018-10-23 DIAGNOSIS — I15.2 HYPERTENSION ASSOCIATED WITH DIABETES: Chronic | ICD-10-CM

## 2018-10-23 PROCEDURE — 71046 X-RAY EXAM CHEST 2 VIEWS: CPT | Mod: 26,,, | Performed by: RADIOLOGY

## 2018-10-23 PROCEDURE — 99214 OFFICE O/P EST MOD 30 MIN: CPT | Mod: S$PBB,,, | Performed by: PHYSICIAN ASSISTANT

## 2018-10-23 PROCEDURE — 3078F DIAST BP <80 MM HG: CPT | Mod: CPTII,,, | Performed by: PHYSICIAN ASSISTANT

## 2018-10-23 PROCEDURE — 99999 PR PBB SHADOW E&M-EST. PATIENT-LVL IV: CPT | Mod: PBBFAC,,, | Performed by: PHYSICIAN ASSISTANT

## 2018-10-23 PROCEDURE — 71046 X-RAY EXAM CHEST 2 VIEWS: CPT | Mod: TC,FY,PO

## 2018-10-23 PROCEDURE — 3008F BODY MASS INDEX DOCD: CPT | Mod: CPTII,,, | Performed by: PHYSICIAN ASSISTANT

## 2018-10-23 PROCEDURE — 3045F PR MOST RECENT HEMOGLOBIN A1C LEVEL 7.0-9.0%: CPT | Mod: CPTII,,, | Performed by: PHYSICIAN ASSISTANT

## 2018-10-23 PROCEDURE — 99214 OFFICE O/P EST MOD 30 MIN: CPT | Mod: PBBFAC,25,PO | Performed by: PHYSICIAN ASSISTANT

## 2018-10-23 PROCEDURE — 3074F SYST BP LT 130 MM HG: CPT | Mod: CPTII,,, | Performed by: PHYSICIAN ASSISTANT

## 2018-10-23 RX ORDER — GLIPIZIDE 2.5 MG/1
5 TABLET, EXTENDED RELEASE ORAL
COMMUNITY
End: 2018-10-23 | Stop reason: SDUPTHER

## 2018-10-23 RX ORDER — GLIPIZIDE 2.5 MG/1
5 TABLET, EXTENDED RELEASE ORAL
Qty: 180 TABLET | Refills: 3 | Status: SHIPPED | OUTPATIENT
Start: 2018-10-23 | End: 2019-03-14 | Stop reason: SDUPTHER

## 2018-10-23 RX ORDER — FENOFIBRATE 160 MG/1
160 TABLET ORAL DAILY
Qty: 90 TABLET | Refills: 3 | Status: SHIPPED | OUTPATIENT
Start: 2018-10-23 | End: 2019-03-14 | Stop reason: SDUPTHER

## 2018-10-23 RX ORDER — NIFEDIPINE 90 MG/1
90 TABLET, EXTENDED RELEASE ORAL DAILY
Qty: 90 TABLET | Refills: 3 | Status: SHIPPED | OUTPATIENT
Start: 2018-10-23 | End: 2019-03-14

## 2018-10-23 RX ORDER — PANTOPRAZOLE SODIUM 40 MG/1
40 TABLET, DELAYED RELEASE ORAL DAILY
Qty: 90 TABLET | Refills: 3 | Status: SHIPPED | OUTPATIENT
Start: 2018-10-23 | End: 2019-03-14

## 2018-10-23 RX ORDER — GABAPENTIN 300 MG/1
300 CAPSULE ORAL NIGHTLY
Qty: 90 CAPSULE | Refills: 3 | Status: SHIPPED | OUTPATIENT
Start: 2018-10-23 | End: 2019-03-14 | Stop reason: SDUPTHER

## 2018-10-23 RX ORDER — ALBUTEROL SULFATE 90 UG/1
2 AEROSOL, METERED RESPIRATORY (INHALATION) EVERY 4 HOURS PRN
Qty: 54 G | Refills: 1 | Status: SHIPPED | OUTPATIENT
Start: 2018-10-23 | End: 2021-05-19

## 2018-10-23 RX ORDER — ATORVASTATIN CALCIUM 80 MG/1
80 TABLET, FILM COATED ORAL DAILY
Qty: 90 TABLET | Refills: 3 | Status: SHIPPED | OUTPATIENT
Start: 2018-10-23 | End: 2019-03-14 | Stop reason: SDUPTHER

## 2018-10-23 RX ORDER — BISACODYL 5 MG
5 TABLET, DELAYED RELEASE (ENTERIC COATED) ORAL DAILY
Status: ON HOLD | COMMUNITY
End: 2021-05-22 | Stop reason: SDUPTHER

## 2018-10-23 RX ORDER — METOPROLOL TARTRATE 25 MG/1
25 TABLET, FILM COATED ORAL 2 TIMES DAILY
Qty: 180 TABLET | Refills: 3 | Status: SHIPPED | OUTPATIENT
Start: 2018-10-23 | End: 2019-03-14

## 2018-10-23 RX ORDER — DOCUSATE SODIUM 100 MG/1
100 CAPSULE, LIQUID FILLED ORAL ONCE
COMMUNITY
End: 2019-03-14

## 2018-10-23 RX ORDER — NITROGLYCERIN 0.4 MG/1
0.4 TABLET SUBLINGUAL
Qty: 90 TABLET | Refills: 0 | Status: SHIPPED | OUTPATIENT
Start: 2018-10-23 | End: 2021-05-19

## 2018-10-23 RX ORDER — ESCITALOPRAM OXALATE 5 MG/1
5 TABLET ORAL DAILY
Qty: 90 TABLET | Refills: 3 | Status: SHIPPED | OUTPATIENT
Start: 2018-10-23 | End: 2019-03-14 | Stop reason: SDUPTHER

## 2018-10-23 NOTE — PATIENT INSTRUCTIONS
Established High Blood Pressure    High blood pressure (hypertension) is a chronic disease. Often, healthcare providers dont know what causes it. But it can be caused by certain health conditions and medicines.  If you have high blood pressure, you may not have any symptoms. If you do have symptoms, they may include headache, dizziness, changes in your vision, chest pain, and shortness of breath. But even without symptoms, high blood pressure thats not treated raises your risk for heart attack and stroke. High blood pressure is a serious health risk and shouldnt be ignored.  A blood pressure reading is made up of two numbers: a higher number over a lower number. The top number is the systolic pressure. The bottom number is the diastolic pressure. A normal blood pressure is a systolic pressure of  less than 120 over a diastolic pressure of less than 80. You will see your blood pressure readings written together. For example, a person with a systolic pressure of 188 and a diastolic pressure of 78 will have 118/78 written in the medical record.  High blood pressure is when either the top number is 140 or higher, or the bottom number is 90 or higher. This must be the result when taking your blood pressure a number of times. The blood pressures between normal and high are called prehypertension.  Home care  If you have high blood pressure, you should do what is listed below to lower your blood pressure. If you are taking medicines for high blood pressure, these methods may reduce or end your need for medicines in the future.  · Begin a weight-loss program if you are overweight.  · Cut back on how much salt you get in your diet. Heres how to do this:  ¨ Dont eat foods that have a lot of salt. These include olives, pickles, smoked meats, and salted potato chips.  ¨ Dont add salt to your food at the table.  ¨ Use only small amounts of salt when cooking.  · Start an exercise program. Talk with your healthcare  provider about the type of exercise program that would be best for you. It doesn't have to be hard. Even brisk walking for 20 minutes 3 times a week is a good form of exercise.  · Dont take medicines that stimulate the heart. This includes many over-the-counter cold and sinus decongestant pills and sprays, as well as diet pills. Check the warnings about hypertension on the label. Before buying any over-the-counter medicines or supplements, always ask the pharmacist about the product's potential interaction with your high blood pressure and your high blood pressure medicines.  · Stimulants such as amphetamine or cocaine could be deadly for someone with high blood pressure. Never take these.  · Limit how much caffeine you get in your diet. Switch to caffeine-free products.  · Stop smoking. If you are a long-time smoker, this can be hard. Talk to your healthcare provider about medicines and nicotine replacement options to help you. Also, enroll in a stop-smoking program to make it more likely that you will quit for good.  · Learn how to handle stress. This is an important part of any program to lower blood pressure. Learn about relaxation methods like meditation, yoga, or biofeedback.  · If your provider prescribed medicines, take them exactly as directed. Missing doses may cause your blood pressure get out of control.  · If you miss a dose or doses, check with your healthcare provider or pharmacist about what to do.  · Consider buying an automatic blood pressure machine. Ask your provider for a recommendation. You can get one of these at most pharmacies.     The American Heart Association recommends the following guidelines for home blood pressure monitoring:  · Don't smoke or drink coffee for 30 minutes before taking your blood pressure.  · Go to the bathroom before the test.  · Relax for 5 minutes before taking the measurement.  · Sit with your back supported (don't sit on a couch or soft chair); keep your feet on  the floor uncrossed. Place your arm on a solid flat surface (like a table) with the upper part of the arm at heart level. Place the middle of the cuff directly above the eye of the elbow. Check the monitor's instruction manual for an illustration.  · Take multiple readings. When you measure, take 2 to 3 readings one minute apart and record all of the results.  · Take your blood pressure at the same time every day, or as your healthcare provider recommends.  · Record the date, time, and blood pressure reading.  · Take the record with you to your next medical appointment. If your blood pressure monitor has a built-in memory, simply take the monitor with you to your next appointment.  · Call your provider if you have several high readings. Don't be frightened by a single high blood pressure reading, but if you get several high readings, check in with your healthcare provider.  · Note: When blood pressure reaches a systolic (top number) of 180 or higher OR diastolic (bottom number) of 110 or higher, seek emergency medical treatment.  Follow-up care  You will need to see your healthcare provider regularly. This is to check your blood pressure and to make changes to your medicines. Make a follow-up appointment as directed. Bring the record of your home blood pressure readings to the appointment.  When to seek medical advice  Call your healthcare provider right away if any of these occur:  · Blood pressure reaches a systolic (upper number) of 180 or higher OR a diastolic (bottom number) of 110 or higher  · Chest pain or shortness of breath  · Severe headache  · Throbbing or rushing sound in the ears  · Nosebleed  · Sudden severe pain in your belly (abdomen)  · Extreme drowsiness, confusion, or fainting  · Dizziness or spinning sensation (vertigo)  · Weakness of an arm or leg or one side of the face  · You have problems speaking or seeing   Date Last Reviewed: 12/1/2016  © 3075-7688 [employer]. [address] [place] [address]  [place] [address]. All rights reserved. This information is not intended as a substitute for professional medical care. Always follow your healthcare professional's instructions.        Walking for Fitness  Fitness walking has something for everyone, even people who are already fit. Walking is one of the safest ways to condition your body aerobically. It can boost energy, help you lose weight, and reduce stress.    Physical benefits  · Walking strengthens your heart and lungs, and tones your muscles.  · When walking, your feet land with less impact than in other sports. This reduces chances of muscle, bone, and joint injury.  · Regular walking improves your cholesterol levels and lowers your risk of heart disease. And it helps you control your blood sugar if you have diabetes.  · Walking is a weight-bearing activity, which helps maintain bone density. This can help prevent osteoporosis.  Personal rewards  · Taking walks can help you relax and manage stress. And fitness walking may make you feel better about yourself.  · Walking can help you sleep better at night and make you less likely to be depressed.  · Regular walking may help maintain your memory as you get older.  · Walking is a great way to spend extra time with friends and family members. Be sure to invite your dog along!  Q&A about fitness walking  Q: Will walking keep me fit?  A: Yes. Regular walking at the right pace gives you all the benefits of other aerobic activities, such as jogging and swimming.  Q: Will walking help me lose weight and keep it off?  A: Yes. Per mile, walking can burn as many calories as jogging. Your health care provider can help work walking into your weight-loss plan.  Q: Is walking safe for my health?  A: Yes. Walking is safe if you have high blood pressure, diabetes, heart disease, or other conditions. Talk to your healthcare provider before you start.  Date Last Reviewed: 4/1/2017  © 5209-9473 [employer],  [employer]. [address] [place] [address]. All rights reserved. This information is not intended as a substitute for professional medical care. Always follow your healthcare professional's instructions.        Weight Management: Getting Started  Healthy bodies come in all shapes and sizes. Not all bodies are made to be thin. For some people, a healthy weight is higher than the average weight listed on weight charts. Your healthcare provider can help you decide on a healthy weight for you.    Reasons to lose weight  Losing weight can help with some health problems, such as high blood pressure, heart disease, diabetes, sleep apnea, and arthritis. You may also feel more energy.  Set your long-term goal  Your goal doesn't even have to be a specific weight. You may decide on a fitness goal (such as being able to walk 10 miles a week), or a health goal (such as lowering your blood pressure). Choose a goal that is measurable and reasonable, so you know when you've reached it. A goal of reaching a BMI of less than 25 is not always reasonable (or possible).   Make an action plan  Habits dont change overnight. Setting your goals too high can leave you feeling discouraged if you cant reach them. Be realistic. Choose one or two small changes you can make now. Set an action plan for how you are going to make these changes. When you can stick to this plan, keep making a few more small changes. Taking small steps will help you stay on the path to success.  Track your progress  Write down your goals. Then, keep a daily record of your progress. Write down what you eat and how active you are. This record lets you look back on how much youve done. It may also help when youre feeling frustrated. Reward yourself for success. Even if you dont reach every goal, give yourself credit for what you do get done.  Get support  Encouragement from others can help make losing weight easier. Ask your family members and friends for  support. They may even want to join you. Also look to your healthcare provider, registered dietitian, and  for help. Your local hospital can give you more information about nutrition, exercise, and weight loss.  Date Last Reviewed: 1/31/2016  © 6812-6189 [employer], [employer]. [address] [place] [address], [place] [address]. All rights reserved. This information is not intended as a substitute for professional medical care. Always follow your healthcare professional's instructions.

## 2018-10-23 NOTE — PROGRESS NOTES
Pre-Visit Chart Review  For Appointment Scheduled on 10/23/18    Health Maintenance Due   Topic Date Due    Eye Exam  08/12/1975    TETANUS VACCINE  08/12/1983    Pneumococcal PPSV23 (High Risk) (1) 12/10/2016    Mammogram  03/24/2017    Colonoscopy  10/05/2017    Lipid Panel  05/17/2018    Hemoglobin A1c  07/24/2018    Influenza Vaccine  08/01/2018

## 2018-10-25 ENCOUNTER — TELEPHONE (OUTPATIENT)
Dept: FAMILY MEDICINE | Facility: CLINIC | Age: 53
End: 2018-10-25

## 2018-10-25 NOTE — TELEPHONE ENCOUNTER
----- Message from Kristi Braxton sent at 10/25/2018 11:06 AM CDT -----  Contact: self  Type:  Test Results    Who Called:  self  Name of Test (Lab/Mammo/Etc):  xray  Date of Test:  10/23/18  Ordering Provider:  Camille Lerma  Where the test was performed:  [facility]  Best Call Back Number:  [phone]  Additional Information:  Patient received the results of labs but not on the xray. Please call patient. Thanks!

## 2018-10-25 NOTE — TELEPHONE ENCOUNTER
----- Message from Socrates Lai sent at 10/25/2018 10:11 AM CDT -----  Contact: Vicky with [facility]   Vicky with [facility] called, Patient visit summary from 10/23 has 3 medications change with no instructions. Please call back [phone]

## 2018-10-25 NOTE — TELEPHONE ENCOUNTER
Spoke with Vicky and clarified medication directions for Glipizide, Isosorbide mononitrate, and metoprolol.  Vicky voiced understanding and states she will notify patient.

## 2018-10-25 NOTE — PROGRESS NOTES
Subjective:       Patient ID: Leanna García is a 53 y.o. female.    Chief Complaint: Hospital Follow Up    HPI   Patient is a 53 year old  female presenting to the clinic with her mother for follow-up hospitalization at [facility] after presenting to [facility] ER on 8/28/18 with midsternal CP, nausea, vomiting, and shortness of breath. Patient transferred to [facility] via Dr. Parry for possible heart cath.     During hospitalization, patient underwent blood transfusion 2/2 Hg drop. Renal function slowly declined requiring hydration and eventually lending to acute hypoxic respiratory failure 2/2 volume overload. Patient underwent tunneled catherterization for HD on 9/26 and eventually HD on 9/27. Her electrolytes were difficult to manage requiring aggressive diuresis. She was placed on bipap during hospitalization, but was eventually weaned off after HD initiated. Her stay was complicated by an elevated WBC & fever of 101. No infectious source was appreciated. ID decided to not start antibiotics & she remained fever free following work-up. Patient underwent EGD on 9/15/18 for concern of GI bleed which showed hiatal hernia & noble's eriosions, otherwise normal. Patient set up with HD MWF as an outpatient and discharged to SNF.    Today, patient is living back at home with her . She reports feeling improved. She has an apt to follow-up with her cardiologist- Dr. Mason on Nov 7 in [place]. She is unsure wether or not she had a heart attack, but never underwent heart catherterization. She is undergoing HD at [facility]. She is not set up with pulmonology following hospitalization.    Of note, patient was supposed to repeat a colonoscopy last year after a Dr. Chanel found a 30mm polyp. SHe has yet to do this. We will also try and get her scheduled for mammogram & other health maintenance items today. She will get her [facility]   Review of Systems    Constitutional: Negative for activity change, appetite change, chills, diaphoresis, fatigue and fever.   HENT: Negative for congestion, postnasal drip and rhinorrhea.    Respiratory: Negative.  Negative for cough, shortness of breath and wheezing.    Cardiovascular: Negative.  Negative for chest pain.   Gastrointestinal: Negative for abdominal pain, blood in stool, constipation, diarrhea, nausea and vomiting.   Genitourinary: Negative for dysuria, frequency, hematuria and urgency.   Musculoskeletal: Negative.    Skin: Negative.  Negative for color change and rash.   Neurological: Positive for weakness. Negative for dizziness, syncope and light-headedness.   Psychiatric/Behavioral: Negative for agitation, behavioral problems and confusion.       Objective:      Physical Exam   Constitutional: Vital signs are normal. She appears well-developed and well-nourished. No distress.   Cardiovascular: Normal rate, regular rhythm, S1 normal, S2 normal and normal heart sounds. Exam reveals no gallop.   No murmur heard.  Pulses:       Radial pulses are 2+ on the right side, and 2+ on the left side.   Pulmonary/Chest: Effort normal and breath sounds normal. No respiratory distress. She has no wheezes. She has no rhonchi.   Skin: Skin is warm and dry. She is not diaphoretic.   Psychiatric: She has a normal mood and affect. Her speech is normal and behavior is normal. Judgment and thought content normal. Cognition and memory are normal.       Assessment:       1. Stage 5 chronic kidney disease on chronic dialysis    2. Diabetes mellitus with nephropathy    3. Chronic diastolic heart failure    4. Hypertension associated with diabetes    5. Breast cancer screening    6. Polyp of colon, unspecified part of colon, unspecified type    7. Hypercholesteremia    8. Abnormal CXR        Plan:       Leanna was seen today for hospital follow up.    Diagnoses and all orders for this visit:    Stage 5 chronic kidney disease on chronic  dialysis  -     Comprehensive metabolic panel; Future  -     Brain natriuretic peptide; Future    Diabetes mellitus with nephropathy  -     Hemoglobin A1c; Future  -     CBC auto differential; Future  -     Comprehensive metabolic panel; Future  -     Hemoglobin A1c; Future  -     gabapentin (NEURONTIN) 300 MG capsule; Take 1 capsule (300 mg total) by mouth every evening.  -     glipiZIDE (GLUCOTROL) 2.5 MG TR24; Take 2 tablets (5 mg total) by mouth daily with breakfast.    Chronic diastolic heart failure  -     Comprehensive metabolic panel; Future  -     Brain natriuretic peptide; Future    Hypertension associated with diabetes  Well controlled; continue current BP medications    Breast cancer screening  -     Mammo Digital Screening Bilateral With CAD; Future    Polyp of colon, unspecified part of colon, unspecified type  -     Ambulatory referral to Gastroenterology    Hypercholesteremia  -     fenofibrate 160 MG Tab; Take 1 tablet (160 mg total) by mouth once daily.  -     atorvastatin (LIPITOR) 80 MG tablet; Take 1 tablet (80 mg total) by mouth once daily.    Abnormal CXR  -     X-Ray Chest PA And Lateral; Future    Other orders  -     pantoprazole (PROTONIX) 40 MG tablet; Take 1 tablet (40 mg total) by mouth once daily.  -     nitroGLYCERIN (NITROSTAT) 0.4 MG SL tablet; Place 1 tablet (0.4 mg total) under the tongue as needed.  -     NIFEdipine (PROCARDIA-XL) 90 MG (OSM) 24 hr tablet; Take 1 tablet (90 mg total) by mouth once daily.  -     metoprolol tartrate (LOPRESSOR) 25 MG tablet; Take 1 tablet (25 mg total) by mouth 2 (two) times daily.  -     escitalopram oxalate (LEXAPRO) 5 MG Tab; Take 1 tablet (5 mg total) by mouth once daily.  -     albuterol (PROVENTIL/VENTOLIN HFA) 90 mcg/actuation inhaler; Inhale 2 puffs into the lungs every 4 (four) hours as needed.

## 2018-10-26 DIAGNOSIS — R79.89 ELEVATED LIVER FUNCTION TESTS: Primary | ICD-10-CM

## 2018-10-26 NOTE — TELEPHONE ENCOUNTER
"[employer] Called to get scripts sent for diabetic supplies today. Accucheck Aveva plus monitor, strips, soft click lancets, conrtrols, and BD alcohol preps. Nothing in chart review states we have seen her in office. Pharmacist stated "Pt gave Graves info 2 days ago". The last OV 10/23/18 Dr. Luis Hassan with called just yesterday. Informed Pharmacist to contacy Dr. Metzger or have patient call us to establish care if want.   "

## 2018-10-30 ENCOUNTER — TELEPHONE (OUTPATIENT)
Dept: CARDIOLOGY | Facility: CLINIC | Age: 53
End: 2018-10-30

## 2018-10-30 ENCOUNTER — TELEPHONE (OUTPATIENT)
Dept: ADMINISTRATIVE | Facility: CLINIC | Age: 53
End: 2018-10-30

## 2018-10-30 NOTE — TELEPHONE ENCOUNTER
----- Message from Aidee Gardner MA sent at 10/30/2018 10:23 AM CDT -----  Contact: Vicky Perry from [employer] would like to talk to you about the patient blood pressure 154/ 76 190/95. Last visit was on 5- f/u/ 11 7- 2018 . Please call [phone]. Thank you.

## 2018-10-30 NOTE — TELEPHONE ENCOUNTER
Vicky instructed as ordered by ,no change in medications for now, pt to continue to monitor and record her b/p and will bring her b/p log with her to her f/u on 11/7/18.Mariia reports understanding of these instructions.

## 2018-10-30 NOTE — TELEPHONE ENCOUNTER
,         LOV:5/30/18 with  (out).Pt is scheduled to f/u with  next week on 11/7/18.The home health nurse ,Vicky,called to report that the pt's b/p's have been ranging from 154//95,mostly in the 150-160's systolic.The pt does go to dialysis M-W-F so she does have to adjust the times she takes her medications.Reports the pt is not SOB,no swelling,no c/o c/p.She will continue to monitor her b/p and instructed to bring her b/p readings to her appt next week.Please advise.Thanks,Genoveva

## 2018-11-07 ENCOUNTER — OFFICE VISIT (OUTPATIENT)
Dept: CARDIOLOGY | Facility: CLINIC | Age: 53
End: 2018-11-07
Payer: MEDICARE

## 2018-11-07 VITALS
HEIGHT: 68 IN | DIASTOLIC BLOOD PRESSURE: 76 MMHG | WEIGHT: 202.69 LBS | BODY MASS INDEX: 30.72 KG/M2 | SYSTOLIC BLOOD PRESSURE: 163 MMHG | HEART RATE: 66 BPM

## 2018-11-07 DIAGNOSIS — N18.6 ESRD (END STAGE RENAL DISEASE): ICD-10-CM

## 2018-11-07 DIAGNOSIS — E11.69 HYPERLIPIDEMIA ASSOCIATED WITH TYPE 2 DIABETES MELLITUS: ICD-10-CM

## 2018-11-07 DIAGNOSIS — I15.2 HYPERTENSION ASSOCIATED WITH DIABETES: Chronic | ICD-10-CM

## 2018-11-07 DIAGNOSIS — E78.5 HYPERLIPIDEMIA ASSOCIATED WITH TYPE 2 DIABETES MELLITUS: ICD-10-CM

## 2018-11-07 DIAGNOSIS — E11.59 HYPERTENSION ASSOCIATED WITH DIABETES: Chronic | ICD-10-CM

## 2018-11-07 DIAGNOSIS — I50.32 CHRONIC DIASTOLIC HEART FAILURE: Primary | ICD-10-CM

## 2018-11-07 DIAGNOSIS — Z91.89 CARDIOVASCULAR EVENT RISK: ICD-10-CM

## 2018-11-07 DIAGNOSIS — F41.1 GAD (GENERALIZED ANXIETY DISORDER): ICD-10-CM

## 2018-11-07 PROCEDURE — 99999 PR PBB SHADOW E&M-EST. PATIENT-LVL IV: CPT | Mod: PBBFAC,,, | Performed by: INTERNAL MEDICINE

## 2018-11-07 PROCEDURE — 3045F PR MOST RECENT HEMOGLOBIN A1C LEVEL 7.0-9.0%: CPT | Mod: CPTII,S$GLB,, | Performed by: INTERNAL MEDICINE

## 2018-11-07 PROCEDURE — 3008F BODY MASS INDEX DOCD: CPT | Mod: CPTII,S$GLB,, | Performed by: INTERNAL MEDICINE

## 2018-11-07 PROCEDURE — 99214 OFFICE O/P EST MOD 30 MIN: CPT | Mod: S$GLB,,, | Performed by: INTERNAL MEDICINE

## 2018-11-07 RX ORDER — LISINOPRIL 40 MG/1
40 TABLET ORAL DAILY
Qty: 90 TABLET | Refills: 3 | Status: SHIPPED | OUTPATIENT
Start: 2018-11-07 | End: 2019-03-14

## 2018-11-07 NOTE — PROGRESS NOTES
Subjective:    Patient ID:  Leanna García is a 53 y.o. female who presents for follow-up of hypertension    HPI     The patient is a 52 year old female referred by Dr Hassan 5/11/18. She has a history of poorly controlled hypertension, severe hyperlipidemia, obese, CKD 4, DM II  . She presented to [facility] with chest pain, hypertensive urgency with elevated troponins in face of ESRD and CHF She was transferred to [facility]. She was in heart failure and was started was started on HD. Non invasive ischemic work up was negative .She was discharged to SNF and now back home. Her first morning BP have been high.  Lab Results   Component Value Date     10/23/2018    K 5.0 10/23/2018    CL 95 10/23/2018    CO2 35 (H) 10/23/2018    BUN 48 (H) 10/23/2018    CREATININE 6.8 (H) 10/23/2018    GLU 92 10/23/2018    HGBA1C 7.0 (H) 10/23/2018    MG 2.2 08/28/2018     (H) 10/23/2018     (H) 10/23/2018    ALBUMIN 3.7 10/23/2018    PROT 7.9 10/23/2018    BILITOT 0.5 10/23/2018    WBC 8.05 10/23/2018    HGB 9.8 (L) 10/23/2018    HCT 32.7 (L) 10/23/2018    MCV 86 10/23/2018     10/23/2018    INR 1.0 10/14/2016    TSH 3.238 10/14/2016         Lab Results   Component Value Date    CHOL 257 (H) 05/17/2017    HDL 47 05/17/2017    TRIG 199 (H) 05/17/2017       Lab Results   Component Value Date    LDLCALC 170.2 (H) 05/17/2017       Past Medical History:   Diagnosis Date    A-fib     resolved per patient    Arthritis     Bronchitis     Cardiovascular event risk, ASCVD 10-year risk 6.7% 10/15/2016    Diabetes mellitus     Diabetes mellitus type II     Encounter for blood transfusion     History of colon polyps 11/2/2016    Hyperlipidemia     Hypertension     Stroke        Current Outpatient Medications:     acetaminophen (TYLENOL) 325 MG tablet, Take 325 mg by mouth every 6 (six) hours as needed for Pain (headache)., Disp: , Rfl:     albuterol (PROVENTIL/VENTOLIN HFA) 90 mcg/actuation  inhaler, Inhale 2 puffs into the lungs every 4 (four) hours as needed., Disp: 54 g, Rfl: 1    ascorbic acid, vitamin C, (VITAMIN C) 500 MG tablet, Take 1 tablet (500 mg total) by mouth every evening., Disp: , Rfl:     aspirin (ECOTRIN) 81 MG EC tablet, Take 1 tablet (81 mg total) by mouth once daily., Disp: , Rfl:     atorvastatin (LIPITOR) 80 MG tablet, Take 1 tablet (80 mg total) by mouth once daily., Disp: 90 tablet, Rfl: 3    bisacodyl (DULCOLAX) 5 mg EC tablet, Take 5 mg by mouth once., Disp: , Rfl:     blood sugar diagnostic (ACCU-CHEK KAYLIE PLUS TEST STRP) Strp, 1 strip by Misc.(Non-Drug; Combo Route) route 3 (three) times daily., Disp: 300 strip, Rfl: 3    clopidogrel (PLAVIX) 75 mg tablet, Take 1 tablet (75 mg total) by mouth once daily., Disp: 90 tablet, Rfl: 3    docusate sodium (COLACE) 100 MG capsule, Take 100 mg by mouth once., Disp: , Rfl:     ergocalciferol (ERGOCALCIFEROL) 50,000 unit Cap, Take 50,000 Units by mouth every 7 days., Disp: , Rfl:     escitalopram oxalate (LEXAPRO) 5 MG Tab, Take 1 tablet (5 mg total) by mouth once daily., Disp: 90 tablet, Rfl: 3    fenofibrate 160 MG Tab, Take 1 tablet (160 mg total) by mouth once daily., Disp: 90 tablet, Rfl: 3    ferrous sulfate 325 (65 FE) MG EC tablet, Take 1 tablet (325 mg total) by mouth 2 (two) times daily with meals., Disp: , Rfl: 0    folic acid-vit B6-vit B12 2.5-25-2 mg (FOLBIC OR EQUIV) 2.5-25-2 mg Tab, Take 1 tablet by mouth once daily., Disp: 90 tablet, Rfl: 1    furosemide (LASIX) 20 MG tablet, Take 20 mg by mouth once daily., Disp: , Rfl:     gabapentin (NEURONTIN) 300 MG capsule, Take 1 capsule (300 mg total) by mouth every evening., Disp: 90 capsule, Rfl: 3    glipiZIDE (GLUCOTROL) 2.5 MG TR24, Take 2 tablets (5 mg total) by mouth daily with breakfast., Disp: 180 tablet, Rfl: 3    isosorbide mononitrate (IMDUR) 60 MG 24 hr tablet, Take 1 tablet (60 mg total) by mouth once daily., Disp: 90 tablet, Rfl: 1    lactulose  (CEPHULAC) 20 gram Pack, Take 20 g by mouth 3 (three) times daily., Disp: , Rfl:     metoprolol tartrate (LOPRESSOR) 25 MG tablet, Take 1 tablet (25 mg total) by mouth 2 (two) times daily., Disp: 180 tablet, Rfl: 3    montelukast (SINGULAIR) 10 mg tablet, Take 1 tablet (10 mg total) by mouth every evening., Disp: 90 tablet, Rfl: 1    NIFEdipine (PROCARDIA-XL) 90 MG (OSM) 24 hr tablet, Take 1 tablet (90 mg total) by mouth once daily., Disp: 90 tablet, Rfl: 3    nitroGLYCERIN (NITROSTAT) 0.4 MG SL tablet, Place 1 tablet (0.4 mg total) under the tongue as needed., Disp: 90 tablet, Rfl: 0    pantoprazole (PROTONIX) 40 MG tablet, Take 1 tablet (40 mg total) by mouth once daily., Disp: 90 tablet, Rfl: 3    sucroferric oxyhydroxide (VELPHORO) 500 mg Chew, Take 500 mg by mouth 3 (three) times daily., Disp: , Rfl:     lisinopril (PRINIVIL,ZESTRIL) 40 MG tablet, Take 1 tablet (40 mg total) by mouth once daily., Disp: 90 tablet, Rfl: 3          Review of Systems   Constitution: Negative for decreased appetite, diaphoresis, fever, weakness, malaise/fatigue, weight gain and weight loss.   HENT: Negative for congestion, ear discharge, ear pain and nosebleeds.    Eyes: Negative for blurred vision, double vision and visual disturbance.   Cardiovascular: Negative for chest pain, claudication, cyanosis, dyspnea on exertion, irregular heartbeat, leg swelling, near-syncope, orthopnea, palpitations, paroxysmal nocturnal dyspnea and syncope.   Respiratory: Negative for cough, hemoptysis, shortness of breath, sleep disturbances due to breathing, snoring, sputum production and wheezing.    Endocrine: Negative for polydipsia, polyphagia and polyuria.   Hematologic/Lymphatic: Negative for adenopathy and bleeding problem. Does not bruise/bleed easily.   Skin: Negative for color change, nail changes, poor wound healing and rash.   Musculoskeletal: Negative for muscle cramps and muscle weakness.   Gastrointestinal: Negative for abdominal  "pain, anorexia, change in bowel habit, hematochezia, nausea and vomiting.   Genitourinary: Negative for dysuria, frequency and hematuria.   Neurological: Negative for brief paralysis, difficulty with concentration, excessive daytime sleepiness, dizziness, focal weakness, headaches, light-headedness, seizures and vertigo.   Psychiatric/Behavioral: Negative for altered mental status and depression.   Allergic/Immunologic: Negative for persistent infections.        Objective:BP (!) 163/76 (BP Location: Left arm, Patient Position: Sitting, BP Method: Medium (Automatic))   Pulse 66   Ht 5' 8" (1.727 m)   Wt 91.9 kg (202 lb 11.2 oz)   BMI 30.82 kg/m²             Physical Exam   Constitutional: She is oriented to person, place, and time. She appears well-developed and well-nourished.   HENT:   Head: Normocephalic.   Right Ear: External ear normal.   Left Ear: External ear normal.   Nose: Nose normal.   Inspection of lips, teeth and gums normal   Eyes: Conjunctivae and EOM are normal. Pupils are equal, round, and reactive to light. No scleral icterus.   Neck: Normal range of motion. No JVD present. No tracheal deviation present. No thyromegaly present.   Cardiovascular: Normal rate, regular rhythm, normal heart sounds and intact distal pulses. Exam reveals no gallop and no friction rub.   No murmur heard.  Pulses:       Dorsalis pedis pulses are 2+ on the right side, and 2+ on the left side.   Pulmonary/Chest: Effort normal and breath sounds normal. No respiratory distress. She has no wheezes. She has no rales. She exhibits no tenderness.   Abdominal: Soft. Bowel sounds are normal. She exhibits no distension. There is no hepatosplenomegaly. There is no tenderness. There is no guarding.   Musculoskeletal: Normal range of motion. She exhibits no edema or tenderness.   Lymphadenopathy:   Palpation of lymph nodes of neck and groin normal   Neurological: She is oriented to person, place, and time. No cranial nerve deficit. " She exhibits normal muscle tone. Coordination normal.   Skin: Skin is warm and dry. No rash noted. No erythema. No pallor.   Palpation of skin normal   Psychiatric: She has a normal mood and affect. Her behavior is normal. Judgment and thought content normal.         Assessment:       1. Chronic diastolic heart failure    2. ESRD (end stage renal disease)    3. Hyperlipidemia associated with type 2 diabetes mellitus    4. Hypertension associated with diabetes    5. MANJINDER (generalized anxiety disorder)    6. Cardiovascular event risk, ASCVD 10-year risk 10.5%, 2015         Plan:       Leanna was seen today for follow-up.    Diagnoses and all orders for this visit:    Chronic diastolic heart failure    ESRD (end stage renal disease)  -     Basic metabolic panel; Future; Expected date: 02/05/2019    Hyperlipidemia associated with type 2 diabetes mellitus    Hypertension associated with diabetes  -     lisinopril (PRINIVIL,ZESTRIL) 40 MG tablet; Take 1 tablet (40 mg total) by mouth once daily.    MANJINDER (generalized anxiety disorder)    Cardiovascular event risk, ASCVD 10-year risk 10.5%, 2015

## 2018-11-08 ENCOUNTER — TELEPHONE (OUTPATIENT)
Dept: FAMILY MEDICINE | Facility: CLINIC | Age: 53
End: 2018-11-08

## 2018-11-08 DIAGNOSIS — Z99.2 ESRD ON DIALYSIS: ICD-10-CM

## 2018-11-08 DIAGNOSIS — R53.1 WEAKNESS: Primary | ICD-10-CM

## 2018-11-08 DIAGNOSIS — R26.81 GAIT INSTABILITY: ICD-10-CM

## 2018-11-08 DIAGNOSIS — N18.6 ESRD ON DIALYSIS: ICD-10-CM

## 2018-11-08 NOTE — TELEPHONE ENCOUNTER
----- Message from Johnnie Fiore MA sent at 11/8/2018  1:28 PM CST -----  Contact: Vicky [email or web address]?[facility]  [phone]      ----- Message -----  From: Leilani Case  Sent: 11/8/2018   1:10 PM  To: Maria Guadalupe Obrien Staff ([facility])    Patient is requesting a rolator, please send the order to [facility].  Thank you!

## 2018-11-08 NOTE — TELEPHONE ENCOUNTER
Faxed orders to [facility] [place] with demographics and office notes. Caller notified.

## 2018-11-21 ENCOUNTER — TELEPHONE (OUTPATIENT)
Dept: FAMILY MEDICINE | Facility: CLINIC | Age: 53
End: 2018-11-21

## 2018-11-21 NOTE — TELEPHONE ENCOUNTER
Spoke with Vicky from [facility], Vicky states patients cbg log and glucometer readings did not match at her last visit. Also patient has not been taking glipizide correctly, patient has been taking 2.5 mg in the morning and 2.5 mg in the evening. Vicky states patients cbg readings range from: 70- 129 in the am and 205-424 in the evenings. Vicky will be faxing over cbg log.

## 2018-11-21 NOTE — TELEPHONE ENCOUNTER
Can we make patient an apt to discuss these blood sugars. She will also need a recheck of her liver function enzymes as these were elevated at last check & have not been repeated.

## 2018-11-21 NOTE — TELEPHONE ENCOUNTER
----- Message from Susan Jensen sent at 11/21/2018 11:21 AM CST -----  Type: Needs Medical Advice    Who Called:  [facility]icky  Best Call Back Number: [phone]  Additional Information: Wanted to let office know that she needs to talk to you concerning patient's blood sugar readings.

## 2018-11-27 NOTE — TELEPHONE ENCOUNTER
Offered appointment. Patient declined appointment patient states she does not have transportation at this time. Advised patient to keep recorded of readings and keep appointment to [facility] care. Advise patient to contact office if appointment is needed patient states understanding

## 2018-11-28 ENCOUNTER — TELEPHONE (OUTPATIENT)
Dept: FAMILY MEDICINE | Facility: CLINIC | Age: 53
End: 2018-11-28

## 2018-11-28 NOTE — TELEPHONE ENCOUNTER
----- Message from Johnnie Fiore MA sent at 11/28/2018  2:46 PM CST -----      ----- Message -----  From: Travis Claire  Sent: 11/28/2018   2:33 PM  To: Maria Guadalupe Obrien Staff ([facility])    Type: Needs Medical Advice    Who Called:  Vicky/Chas PULIDO    Best Call Back Number: [phone]  Additional Information: Caller states the blood sugar readings have improved the last week.  Morning range- .  Evening range 143-296.  Patient keeping Accurate Log

## 2018-11-28 NOTE — TELEPHONE ENCOUNTER
Can home health nurse get any insight as to why the night time readings are so elevateD? Are we following a diabetic diet?

## 2018-12-05 ENCOUNTER — TELEPHONE (OUTPATIENT)
Dept: FAMILY MEDICINE | Facility: CLINIC | Age: 53
End: 2018-12-05

## 2018-12-05 NOTE — TELEPHONE ENCOUNTER
Patient home health nurse called states patient nephrologist changed her diabetic medication. Vicky is faxing over the readings

## 2018-12-05 NOTE — TELEPHONE ENCOUNTER
----- Message from Bony Hernandez sent at 12/5/2018  2:49 PM CST -----  Contact: [email or web address] [facility]  Vicky called in regarding the attached patient.  Vicky stated the the patients nephrologist changed her medication (Glipizide).  Vicky stated it was increased to 2.5 mg, 2 x daily.      Vicky's call back number is [phone]

## 2018-12-15 ENCOUNTER — OFFICE VISIT (OUTPATIENT)
Dept: URGENT CARE | Facility: CLINIC | Age: 53
End: 2018-12-15
Payer: MEDICARE

## 2018-12-15 VITALS
HEART RATE: 66 BPM | WEIGHT: 193 LBS | BODY MASS INDEX: 29.35 KG/M2 | DIASTOLIC BLOOD PRESSURE: 76 MMHG | OXYGEN SATURATION: 97 % | TEMPERATURE: 98 F | RESPIRATION RATE: 16 BRPM | SYSTOLIC BLOOD PRESSURE: 152 MMHG

## 2018-12-15 DIAGNOSIS — R05.9 COUGH: Primary | ICD-10-CM

## 2018-12-15 PROCEDURE — 99214 OFFICE O/P EST MOD 30 MIN: CPT | Mod: S$GLB,,, | Performed by: NURSE PRACTITIONER

## 2018-12-15 PROCEDURE — 3008F BODY MASS INDEX DOCD: CPT | Mod: CPTII,S$GLB,, | Performed by: NURSE PRACTITIONER

## 2018-12-15 RX ORDER — BENZONATATE 100 MG/1
100 CAPSULE ORAL 3 TIMES DAILY PRN
Qty: 20 CAPSULE | Refills: 0 | Status: SHIPPED | OUTPATIENT
Start: 2018-12-15 | End: 2019-03-14

## 2018-12-15 NOTE — PATIENT INSTRUCTIONS
Go to the ER if you experience chest pain, shortness of breath, leg swelling, or any new symptoms. Follow up with primary care in 2-3 days.       Coughing Techniques    Airway clearance techniques help to remove mucus from your airways. Clearing the airways helps you breathe better and lowers the chance for infection. One method is controlled coughing. Be sure to also use any medicines before or after clearing your airways, as instructed by your healthcare provider. For example, some people use inhaled bronchodilators before clearing their airways.      Note: Be sure to keep a box of tissues beside you. Wash your hands when you are done.   Controlled coughing  Here is how to do it:   · Sit on a chair with both feet on the floor.  · Take a slow, deep breath through your nose. Hold for 2 counts.  · Lean forward slightly.  · Cough twice--2 short coughs.  · Relax for a few seconds.  Repeat the steps as needed.   The barnett technique  Here is how to do it:   · Sit on a chair with both feet on the floor.  · Take a slow, deep breath through your nose. Hold for 2 counts.  · To breathe out, open your mouth and make a barnett sound in your throat. (This is the same way you might breathe to clean a pair of eyeglasses.)  · Barnett 2 to 3 times as you breathe out.  · Relax for a few seconds.  Repeat the steps as needed.  Follow-up care  Make a follow-up appointment as directed by our staff.     When to call the healthcare provider  Call your healthcare provider right away if you have any of the following:  · Shortness of breath, wheezing, or coughing  · Increased mucus  · Yellow, green, bloody, or smelly mucus  · Fever or chills  · Tightness in your chest that does not go away with rest or medicine  · An irregular heartbeat         Date Last Reviewed: 5/1/2016  © 1574-3764 [employer]. [address] [place] [address], [place] [address]. All rights reserved. This information is not intended as a substitute for professional  medical care. Always follow your healthcare professional's instructions.

## 2018-12-15 NOTE — PROGRESS NOTES
Subjective: cough, congestion       Patient ID: Leanna García is a 53 y.o. female.    Vitals:  weight is 87.5 kg (193 lb). Her temperature is 97.8 °F (36.6 °C). Her blood pressure is 152/76 (abnormal) and her pulse is 66. Her respiration is 16 and oxygen saturation is 97%.     Chief Complaint: Cough (congestion)    Leanna García is a 53 year old female presenting to clinic with c/o a nonproductive cough for the past week. She has had no other upper respiratory symptoms, chest pain, or SOB. She also denies leg pain/swelling. She has been taking robitussin for the cough without improvement.       Cough   This is a new problem. The current episode started in the past 7 days. The problem has been gradually worsening. The cough is productive of sputum. Pertinent negatives include no chest pain, chills, ear pain, fever, sore throat, shortness of breath or wheezing.       Constitution: Negative for chills and fever.   HENT: Negative for ear pain, congestion, sinus pain, sinus pressure and sore throat.    Neck: Negative for neck pain.   Cardiovascular: Negative for chest pain, leg swelling and sob on exertion.   Eyes: Negative for eye discharge.   Respiratory: Positive for cough. Negative for shortness of breath and wheezing.    Gastrointestinal: Negative for nausea and vomiting.   Genitourinary: Negative for frequency and urine decreased.       Objective:      Physical Exam   Constitutional: She is oriented to person, place, and time. She appears well-developed and well-nourished. She is cooperative.  Non-toxic appearance. She does not appear ill. No distress.   HENT:   Head: Normocephalic and atraumatic.   Right Ear: Hearing, tympanic membrane, external ear and ear canal normal.   Left Ear: Hearing, tympanic membrane, external ear and ear canal normal.   Nose: Nose normal. No mucosal edema, rhinorrhea or nasal deformity. No epistaxis. Right sinus exhibits no maxillary sinus tenderness and no frontal sinus tenderness.  Left sinus exhibits no maxillary sinus tenderness and no frontal sinus tenderness.   Mouth/Throat: Uvula is midline, oropharynx is clear and moist and mucous membranes are normal. No trismus in the jaw. Normal dentition. No uvula swelling. No posterior oropharyngeal erythema.   Eyes: Conjunctivae and lids are normal. Right eye exhibits no discharge. Left eye exhibits no discharge. No scleral icterus.   Sclera clear bilat   Neck: Trachea normal, normal range of motion, full passive range of motion without pain and phonation normal. Neck supple.   Cardiovascular: Normal rate, regular rhythm, normal heart sounds, intact distal pulses and normal pulses.   Pulmonary/Chest: Effort normal and breath sounds normal. No respiratory distress. She has no wheezes. She has no rales.   Abdominal: Soft. Normal appearance and bowel sounds are normal. She exhibits no distension, no pulsatile midline mass and no mass. There is no tenderness.   Musculoskeletal: Normal range of motion. She exhibits no edema or deformity.   Neurological: She is alert and oriented to person, place, and time. She exhibits normal muscle tone. Coordination normal.   Skin: Skin is warm, dry and intact. She is not diaphoretic. No pallor.   Psychiatric: She has a normal mood and affect. Her speech is normal and behavior is normal. Judgment and thought content normal. Cognition and memory are normal.   Nursing note and vitals reviewed.      Assessment:       1. Cough        Plan:       Leanna García is a 53 year old female presenting to clinic with c/o cough. She appears well hydrated and nontoxic. No infiltrate or evidence of pulmonary edema on CXR. She has no evidence of CHF exacerbation on exam such as lower extremity edema or rales on lung auscultation. No suspicion for PE, pneumothorax, or other emergent cause of her cough. Cough medication prescribed and patient referred to PCP in two days for recheck. Go to ED for worsening symptoms. Based on my clinical  evaluation, I do not appreciate any immediate, emergent, or life threatening condition or etiology that warrants additional workup today and feel that the patient can be discharged with close follow up care.     Cough  -     XR CHEST PA AND LATERAL; Future; Expected date: 12/15/2018    Other orders  -     benzonatate (TESSALON PERLES) 100 MG capsule; Take 1 capsule (100 mg total) by mouth 3 (three) times daily as needed for Cough.  Dispense: 20 capsule; Refill: 0

## 2018-12-20 ENCOUNTER — TELEPHONE (OUTPATIENT)
Dept: ADMINISTRATIVE | Facility: CLINIC | Age: 53
End: 2018-12-20

## 2018-12-20 NOTE — PROGRESS NOTES
HH re-certification with [facility], Dr. Stefano Lopez. Patient received SN services.

## 2018-12-27 NOTE — TELEPHONE ENCOUNTER
----- Message from Sofie Taylor sent at 10/20/2017 10:31 AM CDT -----  Please call   [facility]  Care  Vicky  At [phone]  Placed a call to  Pod ,  Needs to report a  Higher  Blood  Pressure   #   Detail Level: Simple Render Post-Care Instructions In Note?: no Number Of Freeze-Thaw Cycles: 2 freeze-thaw cycles Consent: The patient's consent was obtained including but not limited to risks of crusting, scabbing, blistering, scarring, darker or lighter pigmentary change, recurrence, incomplete removal and infection. Duration Of Freeze Thaw-Cycle (Seconds): 3 Post-Care Instructions: I reviewed with the patient in detail post-care instructions. Patient is to wear sunprotection, and avoid picking at any of the treated lesions. Pt may apply Vaseline to crusted or scabbing areas.

## 2019-01-16 LAB — HBA1C MFR BLD: 6.4 %

## 2019-02-07 ENCOUNTER — TELEPHONE (OUTPATIENT)
Dept: FAMILY MEDICINE | Facility: CLINIC | Age: 54
End: 2019-02-07

## 2019-02-07 NOTE — TELEPHONE ENCOUNTER
----- Message from Ro Riddle LPN sent at 2/7/2019  3:37 PM CST -----      ----- Message -----  From: Mable Crockett MA  Sent: 2/5/2019   4:57 PM  To: Ro Riddle LPN        ----- Message -----  From: Carl Holden  Sent: 2/5/2019  10:55 AM  To: John Agarwal Staff    Type:  Patient Call Back    Who Called:  Vicky- [facility]   Does the patient know what this is regarding?: pt is being discharged form home health today.  Best Call Back Number:  [phone]  Additional Information:  Please call pt and leave a detailed message if there is no answer.

## 2019-02-28 ENCOUNTER — PATIENT OUTREACH (OUTPATIENT)
Dept: ADMINISTRATIVE | Facility: HOSPITAL | Age: 54
End: 2019-02-28

## 2019-02-28 NOTE — LETTER
March 8, 2019    Leanna García  [address]  [place] [address]             [facility]  [address]  [facility] [address]  Phone: [phone] Dear Leanna García,       [facility] is committed to your overall health.  To help you get the most out of each of your visits, we will review your information to make sure you are up to date on all of your recommended tests and/or procedures.       As a new patient to Dr. JOHN LENTZ, we may not have your complete medical records.  He has found that your chart shows you may be due for a:     YEARLY DIABETIC EYE EXAM & FOOT EXAM   HEMOGLOBIN  A1C   LIPID PANEL   MAMMOGRAM   COLORECTAL CANCER SCREENING       If you have had any of the above done at another facility, please bring the records or information with you so that your record at [facility] will be complete.       If you are currently taking medication, please bring it with you to your appointment for review.     Also, if you have any type of Advanced Directives, please bring them with you to your office visit so we may scan them into your chart.         Dana Valladares LPN Clinical Care Coordinator   [facility]   [address] [place] [address]   Phone [phone]   Fax [phone]

## 2019-03-13 ENCOUNTER — DOCUMENTATION ONLY (OUTPATIENT)
Dept: FAMILY MEDICINE | Facility: CLINIC | Age: 54
End: 2019-03-13

## 2019-03-13 NOTE — PROGRESS NOTES
Pre-Visit Chart Review  For Appointment Scheduled on 3/14/19    Health Maintenance Due   Topic Date Due    Eye Exam  08/12/1975    TETANUS VACCINE  08/12/1983    Pneumococcal Vaccine (Highest Risk) (2 of 3 - PPSV23) 12/10/2016    Mammogram  03/24/2017    Colonoscopy  10/05/2017    Lipid Panel  05/17/2018    Hemoglobin A1c  01/23/2019    Foot Exam  03/23/2019

## 2019-03-14 ENCOUNTER — OFFICE VISIT (OUTPATIENT)
Dept: FAMILY MEDICINE | Facility: CLINIC | Age: 54
End: 2019-03-14
Payer: MEDICARE

## 2019-03-14 VITALS
TEMPERATURE: 98 F | SYSTOLIC BLOOD PRESSURE: 179 MMHG | WEIGHT: 194 LBS | BODY MASS INDEX: 29.4 KG/M2 | HEIGHT: 68 IN | HEART RATE: 76 BPM | DIASTOLIC BLOOD PRESSURE: 83 MMHG

## 2019-03-14 DIAGNOSIS — E11.59 HYPERTENSION ASSOCIATED WITH DIABETES: Chronic | ICD-10-CM

## 2019-03-14 DIAGNOSIS — E11.21 DIABETES MELLITUS WITH NEPHROPATHY: ICD-10-CM

## 2019-03-14 DIAGNOSIS — I15.2 HYPERTENSION ASSOCIATED WITH DIABETES: Chronic | ICD-10-CM

## 2019-03-14 DIAGNOSIS — N18.6 ESRD (END STAGE RENAL DISEASE): ICD-10-CM

## 2019-03-14 DIAGNOSIS — D50.8 IRON DEFICIENCY ANEMIA SECONDARY TO INADEQUATE DIETARY IRON INTAKE: ICD-10-CM

## 2019-03-14 DIAGNOSIS — J32.0 CHRONIC MAXILLARY SINUSITIS: ICD-10-CM

## 2019-03-14 DIAGNOSIS — E78.00 HYPERCHOLESTEREMIA: ICD-10-CM

## 2019-03-14 DIAGNOSIS — F41.1 GAD (GENERALIZED ANXIETY DISORDER): Primary | ICD-10-CM

## 2019-03-14 PROBLEM — J32.9 CHRONIC SINUSITIS: Status: ACTIVE | Noted: 2019-03-14

## 2019-03-14 PROCEDURE — 3045F PR MOST RECENT HEMOGLOBIN A1C LEVEL 7.0-9.0%: CPT | Mod: CPTII,S$GLB,, | Performed by: FAMILY MEDICINE

## 2019-03-14 PROCEDURE — 99999 PR PBB SHADOW E&M-EST. PATIENT-LVL IV: ICD-10-PCS | Mod: PBBFAC,,, | Performed by: FAMILY MEDICINE

## 2019-03-14 PROCEDURE — 99214 OFFICE O/P EST MOD 30 MIN: CPT | Mod: S$GLB,,, | Performed by: FAMILY MEDICINE

## 2019-03-14 PROCEDURE — 3008F PR BODY MASS INDEX (BMI) DOCUMENTED: ICD-10-PCS | Mod: CPTII,S$GLB,, | Performed by: FAMILY MEDICINE

## 2019-03-14 PROCEDURE — 99999 PR PBB SHADOW E&M-EST. PATIENT-LVL IV: CPT | Mod: PBBFAC,,, | Performed by: FAMILY MEDICINE

## 2019-03-14 PROCEDURE — 3008F BODY MASS INDEX DOCD: CPT | Mod: CPTII,S$GLB,, | Performed by: FAMILY MEDICINE

## 2019-03-14 PROCEDURE — 99214 PR OFFICE/OUTPT VISIT, EST, LEVL IV, 30-39 MIN: ICD-10-PCS | Mod: S$GLB,,, | Performed by: FAMILY MEDICINE

## 2019-03-14 PROCEDURE — 3045F PR MOST RECENT HEMOGLOBIN A1C LEVEL 7.0-9.0%: ICD-10-PCS | Mod: CPTII,S$GLB,, | Performed by: FAMILY MEDICINE

## 2019-03-14 RX ORDER — METOPROLOL TARTRATE 25 MG/1
25 TABLET, FILM COATED ORAL NIGHTLY
Qty: 180 TABLET | Refills: 3
Start: 2019-03-14 | End: 2019-09-12

## 2019-03-14 RX ORDER — LANCETS
EACH MISCELLANEOUS
Qty: 200 EACH | Refills: 3 | Status: SHIPPED | OUTPATIENT
Start: 2019-03-14 | End: 2019-11-14

## 2019-03-14 RX ORDER — LOSARTAN POTASSIUM 25 MG/1
25 TABLET ORAL DAILY
COMMUNITY
End: 2019-09-12 | Stop reason: ALTCHOICE

## 2019-03-14 RX ORDER — AMLODIPINE BESYLATE 10 MG/1
10 TABLET ORAL DAILY
COMMUNITY
End: 2019-09-12 | Stop reason: ALTCHOICE

## 2019-03-14 RX ORDER — ESCITALOPRAM OXALATE 5 MG/1
5 TABLET ORAL DAILY
Qty: 90 TABLET | Refills: 3 | Status: SHIPPED | OUTPATIENT
Start: 2019-03-14 | End: 2019-09-12 | Stop reason: SDUPTHER

## 2019-03-14 RX ORDER — FENOFIBRATE 160 MG/1
160 TABLET ORAL DAILY
Qty: 90 TABLET | Refills: 3 | Status: SHIPPED | OUTPATIENT
Start: 2019-03-14 | End: 2020-03-23 | Stop reason: SDUPTHER

## 2019-03-14 RX ORDER — ASPIRIN 81 MG/1
81 TABLET ORAL DAILY
COMMUNITY
Start: 2019-03-14 | End: 2022-03-10

## 2019-03-14 RX ORDER — MONTELUKAST SODIUM 10 MG/1
10 TABLET ORAL NIGHTLY
Qty: 90 TABLET | Refills: 1 | Status: SHIPPED | OUTPATIENT
Start: 2019-03-14 | End: 2021-05-19

## 2019-03-14 RX ORDER — SEVELAMER CARBONATE 800 MG/1
800 TABLET, FILM COATED ORAL
COMMUNITY
End: 2020-01-30

## 2019-03-14 RX ORDER — GABAPENTIN 300 MG/1
300 CAPSULE ORAL NIGHTLY
Qty: 90 CAPSULE | Refills: 3 | Status: SHIPPED | OUTPATIENT
Start: 2019-03-14 | End: 2020-03-27 | Stop reason: SDUPTHER

## 2019-03-14 RX ORDER — GLIPIZIDE 2.5 MG/1
5 TABLET, EXTENDED RELEASE ORAL 2 TIMES DAILY
Qty: 360 TABLET | Refills: 3 | Status: SHIPPED | OUTPATIENT
Start: 2019-03-14 | End: 2020-01-30 | Stop reason: DRUGHIGH

## 2019-03-14 RX ORDER — ATORVASTATIN CALCIUM 80 MG/1
80 TABLET, FILM COATED ORAL DAILY
Qty: 90 TABLET | Refills: 3 | Status: SHIPPED | OUTPATIENT
Start: 2019-03-14 | End: 2020-03-23 | Stop reason: SDUPTHER

## 2019-03-14 RX ORDER — FERROUS SULFATE 325(65) MG
325 TABLET, DELAYED RELEASE (ENTERIC COATED) ORAL 2 TIMES DAILY WITH MEALS
Refills: 0 | COMMUNITY
Start: 2019-03-14 | End: 2019-09-12 | Stop reason: ALTCHOICE

## 2019-03-14 RX ORDER — ISOSORBIDE MONONITRATE 60 MG/1
60 TABLET, EXTENDED RELEASE ORAL DAILY
Qty: 90 TABLET | Refills: 1 | Status: SHIPPED | OUTPATIENT
Start: 2019-03-14 | End: 2019-03-20 | Stop reason: SDUPTHER

## 2019-03-14 RX ORDER — NIFEDIPINE 90 MG/1
90 TABLET, EXTENDED RELEASE ORAL DAILY
Qty: 90 TABLET | Refills: 3 | Status: SHIPPED | OUTPATIENT
Start: 2019-03-14 | End: 2020-01-30 | Stop reason: ALTCHOICE

## 2019-03-14 NOTE — PROGRESS NOTES
Subjective:   Patient ID: Leanna García is a 53 y.o. female     Chief Complaint:Establish Care      Patient with type 2 diabetes which is controlled with oral medications.  Patient with chronic kidney disease which is currently managed with dialysis.  Patient with hypercholesterolemia which is currently managed with statin.      Review of Systems   Constitutional: Negative for chills and fever.   HENT: Negative for sore throat.    Eyes: Negative for visual disturbance.   Respiratory: Negative for shortness of breath.    Cardiovascular: Negative for chest pain.   Gastrointestinal: Negative for abdominal pain.   Endocrine: Negative for polyuria.   Genitourinary: Negative for dysuria.   Musculoskeletal: Negative for back pain.   Skin: Negative for color change.   Neurological: Negative for headaches.   Psychiatric/Behavioral: Negative for agitation and confusion.     Past Medical History:   Diagnosis Date    A-fib     resolved per patient    Arthritis     Bronchitis     Cardiovascular event risk, ASCVD 10-year risk 6.7% 10/15/2016    Diabetes mellitus     Diabetes mellitus type II     Encounter for blood transfusion     History of colon polyps 11/2/2016    Hyperlipidemia     Hypertension     Stroke      Objective:     Vitals:    03/14/19 1249   BP: (!) 179/83   Pulse: 76   Temp: 97.9 °F (36.6 °C)     Body mass index is 29.5 kg/m².  Physical Exam   Constitutional: No distress.   HENT:   Head: Normocephalic and atraumatic.   Eyes: EOM are normal.   Cardiovascular: Normal rate and normal heart sounds.   Pulmonary/Chest: Effort normal.   Abdominal: Bowel sounds are normal.   Musculoskeletal: Normal range of motion.   Neurological: She is alert.   Psychiatric: She has a normal mood and affect.     Assessment:     1. Hypertension associated with diabetes    2. Hypercholesteremia    3. Diabetes mellitus with nephropathy      Plan:   MANJINDER (generalized anxiety disorder)  -     escitalopram oxalate (LEXAPRO) 5 MG  Tab; Take 1 tablet (5 mg total) by mouth once daily.  Dispense: 90 tablet; Refill: 3    Hypertension associated with diabetes  -     aspirin (ECOTRIN) 81 MG EC tablet; Take 1 tablet (81 mg total) by mouth once daily.  -     isosorbide mononitrate (IMDUR) 60 MG 24 hr tablet; Take 1 tablet (60 mg total) by mouth once daily.  Dispense: 90 tablet; Refill: 1  -     NIFEdipine (PROCARDIA-XL) 90 MG (OSM) 24 hr tablet; Take 1 tablet (90 mg total) by mouth once daily.  Dispense: 90 tablet; Refill: 3  -     metoprolol tartrate (LOPRESSOR) 25 MG tablet; Take 1 tablet (25 mg total) by mouth every evening.  Dispense: 180 tablet; Refill: 3    Hypercholesteremia  -     atorvastatin (LIPITOR) 80 MG tablet; Take 1 tablet (80 mg total) by mouth once daily.  Dispense: 90 tablet; Refill: 3  -     fenofibrate 160 MG Tab; Take 1 tablet (160 mg total) by mouth once daily.  Dispense: 90 tablet; Refill: 3    Diabetes mellitus with nephropathy  -     gabapentin (NEURONTIN) 300 MG capsule; Take 1 capsule (300 mg total) by mouth every evening.  Dispense: 90 capsule; Refill: 3  -     glipiZIDE (GLUCOTROL) 2.5 MG TR24; Take 2 tablets (5 mg total) by mouth 2 (two) times daily.  Dispense: 360 tablet; Refill: 3  -     blood sugar diagnostic Strp; To check BG 2 times daily, to use with insurance preferred meter  Dispense: 200 strip; Refill: 3  -     (In Office Administered) Pneumococcal Polysaccharide Vaccine (23 Valent) (SQ/IM)    ESRD (end stage renal disease)    Iron deficiency anemia secondary to inadequate dietary iron intake  -     ferrous sulfate 325 (65 FE) MG EC tablet; Take 1 tablet (325 mg total) by mouth 2 (two) times daily with meals.; Refill: 0    Chronic maxillary sinusitis  -     montelukast (SINGULAIR) 10 mg tablet; Take 1 tablet (10 mg total) by mouth every evening.  Dispense: 90 tablet; Refill: 1    Other orders  -     lactulose (CEPHULAC) 20 gram Pack; Take 1 packet (20 g total) by mouth once daily.  Dispense: 90 packet; Refill:  3  -     lancets Misc; To check BG 2 times daily, to use with insurance preferred meter  Dispense: 200 each; Refill: 3            Time spent with patient: 30 minutes and over half of that time was spent on counseling an coordination of care.    Stefano Lopez MD  03/14/2019    Portions of this note have been dictated with BRIAN Perdomo

## 2019-03-15 ENCOUNTER — TELEPHONE (OUTPATIENT)
Dept: ADMINISTRATIVE | Facility: HOSPITAL | Age: 54
End: 2019-03-15

## 2019-03-20 ENCOUNTER — OFFICE VISIT (OUTPATIENT)
Dept: CARDIOLOGY | Facility: CLINIC | Age: 54
End: 2019-03-20
Payer: MEDICARE

## 2019-03-20 VITALS
WEIGHT: 196.56 LBS | SYSTOLIC BLOOD PRESSURE: 165 MMHG | HEART RATE: 63 BPM | BODY MASS INDEX: 29.88 KG/M2 | DIASTOLIC BLOOD PRESSURE: 83 MMHG

## 2019-03-20 DIAGNOSIS — N18.6 ESRD (END STAGE RENAL DISEASE): ICD-10-CM

## 2019-03-20 DIAGNOSIS — Z99.2 END STAGE RENAL DISEASE ON DIALYSIS: ICD-10-CM

## 2019-03-20 DIAGNOSIS — E11.59 HYPERTENSION ASSOCIATED WITH DIABETES: Chronic | ICD-10-CM

## 2019-03-20 DIAGNOSIS — N18.6 END STAGE RENAL DISEASE ON DIALYSIS: ICD-10-CM

## 2019-03-20 DIAGNOSIS — I50.32 CHRONIC DIASTOLIC HEART FAILURE: Primary | ICD-10-CM

## 2019-03-20 DIAGNOSIS — N18.4 CKD STAGE 4 DUE TO TYPE 2 DIABETES MELLITUS: ICD-10-CM

## 2019-03-20 DIAGNOSIS — I10 HYPERTENSION, UNSPECIFIED TYPE: ICD-10-CM

## 2019-03-20 DIAGNOSIS — I15.2 HYPERTENSION ASSOCIATED WITH DIABETES: Chronic | ICD-10-CM

## 2019-03-20 DIAGNOSIS — E11.22 CKD STAGE 4 DUE TO TYPE 2 DIABETES MELLITUS: ICD-10-CM

## 2019-03-20 DIAGNOSIS — E78.5 HYPERLIPIDEMIA, UNSPECIFIED HYPERLIPIDEMIA TYPE: ICD-10-CM

## 2019-03-20 PROCEDURE — 99999 PR PBB SHADOW E&M-EST. PATIENT-LVL III: ICD-10-PCS | Mod: PBBFAC,,, | Performed by: INTERNAL MEDICINE

## 2019-03-20 PROCEDURE — 3008F PR BODY MASS INDEX (BMI) DOCUMENTED: ICD-10-PCS | Mod: CPTII,S$GLB,, | Performed by: INTERNAL MEDICINE

## 2019-03-20 PROCEDURE — 99999 PR PBB SHADOW E&M-EST. PATIENT-LVL III: CPT | Mod: PBBFAC,,, | Performed by: INTERNAL MEDICINE

## 2019-03-20 PROCEDURE — 99214 OFFICE O/P EST MOD 30 MIN: CPT | Mod: S$GLB,,, | Performed by: INTERNAL MEDICINE

## 2019-03-20 PROCEDURE — 99214 PR OFFICE/OUTPT VISIT, EST, LEVL IV, 30-39 MIN: ICD-10-PCS | Mod: S$GLB,,, | Performed by: INTERNAL MEDICINE

## 2019-03-20 PROCEDURE — 3008F BODY MASS INDEX DOCD: CPT | Mod: CPTII,S$GLB,, | Performed by: INTERNAL MEDICINE

## 2019-03-20 PROCEDURE — 3044F HG A1C LEVEL LT 7.0%: CPT | Mod: CPTII,S$GLB,, | Performed by: INTERNAL MEDICINE

## 2019-03-20 PROCEDURE — 3044F PR MOST RECENT HEMOGLOBIN A1C LEVEL <7.0%: ICD-10-PCS | Mod: CPTII,S$GLB,, | Performed by: INTERNAL MEDICINE

## 2019-03-20 RX ORDER — LOSARTAN POTASSIUM 50 MG/1
50 TABLET ORAL DAILY
COMMUNITY
Start: 2019-03-18 | End: 2019-09-12 | Stop reason: ALTCHOICE

## 2019-03-20 RX ORDER — ISOSORBIDE MONONITRATE 60 MG/1
60 TABLET, EXTENDED RELEASE ORAL DAILY
Qty: 90 TABLET | Refills: 3 | Status: SHIPPED | OUTPATIENT
Start: 2019-03-20 | End: 2021-05-19

## 2019-03-20 RX ORDER — METOPROLOL SUCCINATE 100 MG/1
100 TABLET, EXTENDED RELEASE ORAL DAILY
COMMUNITY
Start: 2019-03-18 | End: 2020-01-30 | Stop reason: DRUGHIGH

## 2019-03-20 RX ORDER — CLOPIDOGREL BISULFATE 75 MG/1
75 TABLET ORAL DAILY
Qty: 90 TABLET | Refills: 3 | Status: SHIPPED | OUTPATIENT
Start: 2019-03-20 | End: 2021-05-19

## 2019-03-20 NOTE — PROGRESS NOTES
Subjective:    Patient ID:  Leanna García is a 53 y.o. female who presents for follow-up of HFpEF    HPI   The patient is a 52 year old female with a history of poorly controlled hypertension, severe hyperlipidemia, obese, CKD 4, DM II  . [facility], hypertensive urgency with elevated troponins in face of ESRD and CHF She was transferred to [facility]. She was in heart failure and was started was started on HD. Non invasive ischemic work up was negative . Since her last vist she has been doing well without chest pain or DUQUE. She is walking regularly.  Conclusion 4/25/18       · Left ventricle shows mild concentric hypertrophy.  · Left ventricle ejection fraction is increased (hyperdynamic).  · Left ventricular diastolic filling is abnormal.     Difficult apical windows          Lab Results   Component Value Date     10/23/2018    K 5.0 10/23/2018    CL 95 10/23/2018    CO2 35 (H) 10/23/2018    BUN 48 (H) 10/23/2018    CREATININE 6.8 (H) 10/23/2018    GLU 92 10/23/2018    HGBA1C 6.4 01/16/2019    MG 2.2 08/28/2018     (H) 10/23/2018     (H) 10/23/2018    ALBUMIN 3.7 10/23/2018    PROT 7.9 10/23/2018    BILITOT 0.5 10/23/2018    WBC 8.05 10/23/2018    HGB 9.8 (L) 10/23/2018    HCT 32.7 (L) 10/23/2018    MCV 86 10/23/2018     10/23/2018    INR 1.0 10/14/2016    TSH 3.238 10/14/2016         Lab Results   Component Value Date    CHOL 257 (H) 05/17/2017    HDL 47 05/22/2018    TRIG 199 (H) 05/17/2017       Lab Results   Component Value Date    LDLCALC 159 05/22/2018       Past Medical History:   Diagnosis Date    A-fib     resolved per patient    Arthritis     Bronchitis     Cardiovascular event risk, ASCVD 10-year risk 6.7% 10/15/2016    Diabetes mellitus     Diabetes mellitus type II     Encounter for blood transfusion     History of colon polyps 11/2/2016    Hyperlipidemia     Hypertension     Stroke        Current Outpatient  Medications:     acetaminophen (TYLENOL) 325 MG tablet, Take 325 mg by mouth every 6 (six) hours as needed for Pain (headache)., Disp: , Rfl:     albuterol (PROVENTIL/VENTOLIN HFA) 90 mcg/actuation inhaler, Inhale 2 puffs into the lungs every 4 (four) hours as needed., Disp: 54 g, Rfl: 1    amLODIPine (NORVASC) 10 MG tablet, Take 10 mg by mouth once daily., Disp: , Rfl:     ascorbic acid, vitamin C, (VITAMIN C) 500 MG tablet, Take 1 tablet (500 mg total) by mouth every evening., Disp: , Rfl:     aspirin (ECOTRIN) 81 MG EC tablet, Take 1 tablet (81 mg total) by mouth once daily., Disp: , Rfl:     atorvastatin (LIPITOR) 80 MG tablet, Take 1 tablet (80 mg total) by mouth once daily., Disp: 90 tablet, Rfl: 3    bisacodyl (DULCOLAX) 5 mg EC tablet, Take 5 mg by mouth once., Disp: , Rfl:     blood sugar diagnostic Strp, To check BG 2 times daily, to use with insurance preferred meter, Disp: 200 strip, Rfl: 3    clopidogrel (PLAVIX) 75 mg tablet, Take 1 tablet (75 mg total) by mouth once daily., Disp: 90 tablet, Rfl: 3    fenofibrate 160 MG Tab, Take 1 tablet (160 mg total) by mouth once daily., Disp: 90 tablet, Rfl: 3    folic acid-vit B6-vit B12 2.5-25-2 mg (FOLBIC OR EQUIV) 2.5-25-2 mg Tab, Take 1 tablet by mouth once daily., Disp: 90 tablet, Rfl: 1    gabapentin (NEURONTIN) 300 MG capsule, Take 1 capsule (300 mg total) by mouth every evening., Disp: 90 capsule, Rfl: 3    glipiZIDE (GLUCOTROL) 2.5 MG TR24, Take 2 tablets (5 mg total) by mouth 2 (two) times daily., Disp: 360 tablet, Rfl: 3    isosorbide mononitrate (IMDUR) 60 MG 24 hr tablet, Take 1 tablet (60 mg total) by mouth once daily., Disp: 90 tablet, Rfl: 1    lactulose (CEPHULAC) 20 gram Pack, Take 1 packet (20 g total) by mouth once daily., Disp: 90 packet, Rfl: 3    lancets Misc, To check BG 2 times daily, to use with insurance preferred meter, Disp: 200 each, Rfl: 3    losartan (COZAAR) 25 MG tablet, Take 25 mg by mouth once daily., Disp: ,  Rfl:     metoprolol tartrate (LOPRESSOR) 25 MG tablet, Take 1 tablet (25 mg total) by mouth every evening., Disp: 180 tablet, Rfl: 3    montelukast (SINGULAIR) 10 mg tablet, Take 1 tablet (10 mg total) by mouth every evening., Disp: 90 tablet, Rfl: 1    nitroGLYCERIN (NITROSTAT) 0.4 MG SL tablet, Place 1 tablet (0.4 mg total) under the tongue as needed., Disp: 90 tablet, Rfl: 0    ergocalciferol (ERGOCALCIFEROL) 50,000 unit Cap, Take 50,000 Units by mouth every 7 days., Disp: , Rfl:     escitalopram oxalate (LEXAPRO) 5 MG Tab, Take 1 tablet (5 mg total) by mouth once daily., Disp: 90 tablet, Rfl: 3    ferrous sulfate 325 (65 FE) MG EC tablet, Take 1 tablet (325 mg total) by mouth 2 (two) times daily with meals., Disp: , Rfl: 0    losartan (COZAAR) 50 MG tablet, Take 50 mg by mouth once daily., Disp: , Rfl:     metoprolol succinate (TOPROL-XL) 100 MG 24 hr tablet, Take 100 mg by mouth once daily., Disp: , Rfl:     NIFEdipine (PROCARDIA-XL) 90 MG (OSM) 24 hr tablet, Take 1 tablet (90 mg total) by mouth once daily., Disp: 90 tablet, Rfl: 3    sevelamer carbonate (RENVELA) 800 mg Tab, Take 800 mg by mouth 3 (three) times daily with meals., Disp: , Rfl:     sucroferric oxyhydroxide (VELPHORO) 500 mg Chew, Take 500 mg by mouth 3 (three) times daily., Disp: , Rfl:     walker (ULTRA-LIGHT ROLLATOR) Misc, 1 Units by Misc.(Non-Drug; Combo Route) route once daily., Disp: 1 each, Rfl: 0          Review of Systems   Constitution: Negative for decreased appetite, diaphoresis, fever, weakness, malaise/fatigue, weight gain and weight loss.   HENT: Negative for congestion, ear discharge, ear pain and nosebleeds.    Eyes: Negative for blurred vision, double vision and visual disturbance.   Cardiovascular: Negative for chest pain, claudication, cyanosis, dyspnea on exertion, irregular heartbeat, leg swelling, near-syncope, orthopnea, palpitations, paroxysmal nocturnal dyspnea and syncope.   Respiratory: Negative for cough,  hemoptysis, shortness of breath, sleep disturbances due to breathing, snoring, sputum production and wheezing.    Endocrine: Negative for polydipsia, polyphagia and polyuria.   Hematologic/Lymphatic: Negative for adenopathy and bleeding problem. Does not bruise/bleed easily.   Skin: Negative for color change, nail changes, poor wound healing and rash.   Musculoskeletal: Negative for muscle cramps and muscle weakness.   Gastrointestinal: Negative for abdominal pain, anorexia, change in bowel habit, hematochezia, nausea and vomiting.   Genitourinary: Negative for dysuria, frequency and hematuria.   Neurological: Negative for brief paralysis, difficulty with concentration, excessive daytime sleepiness, dizziness, focal weakness, headaches, light-headedness, seizures and vertigo.   Psychiatric/Behavioral: Negative for altered mental status and depression.   Allergic/Immunologic: Negative for persistent infections.        Objective:BP (!) 165/83   Pulse 63   Wt 89.1 kg (196 lb 8.6 oz)   BMI 29.88 kg/m²             Physical Exam   Constitutional: She is oriented to person, place, and time. She appears well-developed and well-nourished.   HENT:   Head: Normocephalic.   Right Ear: External ear normal.   Left Ear: External ear normal.   Nose: Nose normal.   Inspection of lips, teeth and gums normal   Eyes: Conjunctivae and EOM are normal. Pupils are equal, round, and reactive to light. No scleral icterus.   Neck: Normal range of motion. No JVD present. No tracheal deviation present. No thyromegaly present.   Cardiovascular: Normal rate, regular rhythm, normal heart sounds and intact distal pulses. Exam reveals no gallop and no friction rub.   No murmur heard.  Pulses:       Dorsalis pedis pulses are 2+ on the right side, and 2+ on the left side.   Pulmonary/Chest: Effort normal and breath sounds normal. No respiratory distress. She has no wheezes. She has no rales. She exhibits no tenderness.       Abdominal: Soft. Bowel  sounds are normal. She exhibits no distension. There is no hepatosplenomegaly. There is no tenderness. There is no guarding.   Musculoskeletal: Normal range of motion. She exhibits no edema or tenderness.   Lymphadenopathy:   Palpation of lymph nodes of neck and groin normal   Neurological: She is oriented to person, place, and time. No cranial nerve deficit. She exhibits normal muscle tone. Coordination normal.   Skin: Skin is warm and dry. No rash noted. No erythema. No pallor.        Palpation of skin normal   Psychiatric: She has a normal mood and affect. Her behavior is normal. Judgment and thought content normal.         Assessment:       1. Chronic diastolic heart failure    2. Hyperlipidemia, unspecified hyperlipidemia type    3. Hypertension, unspecified type    4. CKD stage 4 due to type 2 diabetes mellitus    5. ESRD (end stage renal disease)    6. End stage renal disease on dialysis         Plan:       Leanna was seen today for follow-up.    Diagnoses and all orders for this visit:    Chronic diastolic heart failure    Hyperlipidemia, unspecified hyperlipidemia type    Hypertension, unspecified type    CKD stage 4 due to type 2 diabetes mellitus    ESRD (end stage renal disease)    End stage renal disease on dialysis    Other orders  -     metoprolol succinate (TOPROL-XL) 100 MG 24 hr tablet; Take 100 mg by mouth once daily.  -     losartan (COZAAR) 50 MG tablet; Take 50 mg by mouth once daily.

## 2019-03-20 NOTE — TELEPHONE ENCOUNTER
----- Message from Zulma Young sent at 3/20/2019 12:28 PM CDT -----  Contact: self  Type:  RX Refill Request    Who Called: patient  Refill or New Rx:refill  RX Name and Strength: Isosorbide, monoer 60mg, Clopidogrel 75mg, Fobic tablet  How is the patient currently taking it? (ex. 1XDay):na  Is this a 30 day or 90 day RX: 90 supply  Preferred Pharmacy with phone number: [email or web address]  Local or Mail Order:Na  Ordering Provider: Dr Mason   Would the patient rather a call back or a response via [facility]? call  Best Call Back Number:[phone]  Additional Information: patient need to speak with nurse

## 2019-03-28 ENCOUNTER — TELEPHONE (OUTPATIENT)
Dept: FAMILY MEDICINE | Facility: CLINIC | Age: 54
End: 2019-03-28

## 2019-03-28 NOTE — TELEPHONE ENCOUNTER
----- Message from Natalie Ojeda sent at 3/28/2019 11:25 AM CDT -----  Contact: Sharon Coates want to speak with a nurse regarding medication clarification please call back at [phone]

## 2019-03-29 ENCOUNTER — TELEPHONE (OUTPATIENT)
Dept: FAMILY MEDICINE | Facility: CLINIC | Age: 54
End: 2019-03-29

## 2019-03-29 NOTE — TELEPHONE ENCOUNTER
Pharmacy needed the qty clarified on rx for lactose. Office was advised yesterday that 90 bottles would be needed for a 90 day supply but that is incorrect. Advised pharmacist that she is to take 15 mL a day and fill for a 90 day supply and 3 refills. ANGEL baptiste

## 2019-03-29 NOTE — TELEPHONE ENCOUNTER
----- Message from Shannan More sent at 3/29/2019 10:19 AM CDT -----  Contact: kezia mcconnell/ [facility] pharmacy ph#[phone]  kezia mcconnell/ [facility] pharmacy ph#[phone] calling to clarify prescription lactose

## 2019-04-16 ENCOUNTER — TELEPHONE (OUTPATIENT)
Dept: FAMILY MEDICINE | Facility: CLINIC | Age: 54
End: 2019-04-16

## 2019-04-16 NOTE — TELEPHONE ENCOUNTER
----- Message from Leilani Case sent at 4/16/2019  1:52 PM CDT -----  Contact: self [phone]  She said she will call to schedule the mammogram when she has her port removed. Thank you!

## 2019-05-07 DIAGNOSIS — E11.21 DIABETES MELLITUS WITH NEPHROPATHY: ICD-10-CM

## 2019-05-07 RX ORDER — GLIPIZIDE 2.5 MG/1
5 TABLET, EXTENDED RELEASE ORAL 2 TIMES DAILY
Qty: 360 TABLET | Refills: 3 | Status: CANCELLED | OUTPATIENT
Start: 2019-05-07 | End: 2020-05-06

## 2019-05-08 ENCOUNTER — TELEPHONE (OUTPATIENT)
Dept: FAMILY MEDICINE | Facility: CLINIC | Age: 54
End: 2019-05-08

## 2019-05-08 NOTE — TELEPHONE ENCOUNTER
Spoke with pharmacy to see if there were any issues with her medications being processed. Dana advised that those three medications aren't due for refills until 5/17.

## 2019-05-08 NOTE — TELEPHONE ENCOUNTER
----- Message from Socrates Lai sent at 5/8/2019  9:22 AM CDT -----  Contact: self   Type:  Patient Returning Call    Who Called: patient   Who Left Message for Patient:  Ro   Does the patient know what this is regarding?:  Yes   Best Call Back Number:  [phone] (home)

## 2019-05-08 NOTE — TELEPHONE ENCOUNTER
----- Message from Ro Riddle LPN sent at 5/7/2019  4:07 PM CDT -----  Contact: self       ----- Message -----  From: Socrates Lai  Sent: 5/7/2019   3:42 PM  To: John Agarwal Staff    Type:  RX Refill Request    Who Called:  Self   Refill or New Rx: refill   RX Name and Strength: glipiZIDE (GLUCOTROL) 2.5 MG TR24, and Accucheck Merari plus strips, escitalopram oxalate (LEXAPRO) 5 MG Tab   Preferred Pharmacy with phone number:   [facility] - [place], [place] - [address]  [address] [facility] [address]  Phone: [phone] Fax: [phone]  Best Call Back Number: [phone] (home)

## 2019-05-08 NOTE — TELEPHONE ENCOUNTER
----- Message from So Mansfield sent at 5/8/2019 11:51 AM CDT -----  Contact: Patient  Type:  Patient Returning Call    Who Called: Patient  Who Left Message for Patient: Mable  Does the patient know what this is regarding?: na  Best Call Back Number:   Additional Information:  Call to pod. No answer.

## 2019-08-09 ENCOUNTER — TELEPHONE (OUTPATIENT)
Dept: CARDIOLOGY | Facility: CLINIC | Age: 54
End: 2019-08-09

## 2019-08-09 NOTE — TELEPHONE ENCOUNTER
----- Message from Elvira Rodriguez sent at 8/9/2019 12:05 PM CDT -----  No. [phone]    Insurance company is no longer covering in full the Plavix and Imdur.  She cannot afford the new price.    Please call.

## 2019-08-14 ENCOUNTER — TELEPHONE (OUTPATIENT)
Dept: CARDIOLOGY | Facility: CLINIC | Age: 54
End: 2019-08-14

## 2019-08-14 ENCOUNTER — TELEPHONE (OUTPATIENT)
Dept: SURGERY | Facility: CLINIC | Age: 54
End: 2019-08-14

## 2019-08-14 NOTE — TELEPHONE ENCOUNTER
----- Message from Ophelia Hitchcock sent at 8/14/2019  2:50 PM CDT -----  Contact: patient  Type: Needs Medical Advice    Who Called:  patient  Symptoms (please be specific):  na  How long has patient had these symptoms:  luis e  Pharmacy name and phone #:  [facility]  Best Call Back Number: [phone]  Additional Information: Patient states Dr. Mason has prescribed plavix and another medication that she can not afford patient states she spoke with Claudia and she was going to check on this for her, no one has returned call and patient wanted to let office know she is not taking medication.  Please call to advise. Thanks!

## 2019-08-14 NOTE — TELEPHONE ENCOUNTER
Returned patient's call reference to medications prescribed being unaffordable, no answer. Mailbox is full ,not able to leave messages.

## 2019-08-14 NOTE — TELEPHONE ENCOUNTER
----- Message from Earle Maldonado MA sent at 8/14/2019  3:33 PM CDT -----  Contact: LELA DEL CASTILLO [[id number]]      ----- Message -----  From: Eryn Bailey  Sent: 8/14/2019   3:14 PM  To: Juana Benson Staff     Name of Who is Calling: LELA DEL CASTILLO [[id number]]       What is the request in detail: Patient is returning a call from staff in regards to her medication and getting a refill  .....Please contact to further discuss and advise.     Can the clinic reply by [facility]: no     [facility]: [phone]

## 2019-08-14 NOTE — TELEPHONE ENCOUNTER
Spoke with patient about the medication patient will call back tomorrow.  I told we can try [facility].

## 2019-09-10 ENCOUNTER — DOCUMENTATION ONLY (OUTPATIENT)
Dept: FAMILY MEDICINE | Facility: CLINIC | Age: 54
End: 2019-09-10

## 2019-09-10 NOTE — PROGRESS NOTES
Pre-Visit Chart Review  For Appointment Scheduled on 9/12/19    Health Maintenance Due   Topic Date Due    Eye Exam  08/12/1975    TETANUS VACCINE  08/12/1983    Pneumococcal Vaccine (Highest Risk) (2 of 3 - PPSV23) 12/10/2016    Mammogram  03/24/2017    Colonoscopy  10/05/2017    Foot Exam  03/23/2019    Hemoglobin A1c  04/16/2019    Lipid Panel  05/22/2019

## 2019-09-12 ENCOUNTER — OFFICE VISIT (OUTPATIENT)
Dept: FAMILY MEDICINE | Facility: CLINIC | Age: 54
End: 2019-09-12
Payer: MEDICARE

## 2019-09-12 ENCOUNTER — CLINICAL SUPPORT (OUTPATIENT)
Dept: FAMILY MEDICINE | Facility: CLINIC | Age: 54
End: 2019-09-12
Attending: FAMILY MEDICINE
Payer: MEDICARE

## 2019-09-12 VITALS
DIASTOLIC BLOOD PRESSURE: 70 MMHG | WEIGHT: 201.25 LBS | TEMPERATURE: 98 F | HEIGHT: 68 IN | OXYGEN SATURATION: 96 % | HEART RATE: 66 BPM | BODY MASS INDEX: 30.5 KG/M2 | SYSTOLIC BLOOD PRESSURE: 134 MMHG

## 2019-09-12 DIAGNOSIS — E11.69 HYPERLIPIDEMIA ASSOCIATED WITH TYPE 2 DIABETES MELLITUS: Primary | ICD-10-CM

## 2019-09-12 DIAGNOSIS — Z12.39 SCREENING FOR BREAST CANCER: ICD-10-CM

## 2019-09-12 DIAGNOSIS — E78.5 HYPERLIPIDEMIA ASSOCIATED WITH TYPE 2 DIABETES MELLITUS: Primary | ICD-10-CM

## 2019-09-12 DIAGNOSIS — F41.1 GAD (GENERALIZED ANXIETY DISORDER): ICD-10-CM

## 2019-09-12 DIAGNOSIS — Z12.11 SCREENING FOR COLON CANCER: ICD-10-CM

## 2019-09-12 PROCEDURE — 3044F PR MOST RECENT HEMOGLOBIN A1C LEVEL <7.0%: ICD-10-PCS | Mod: CPTII,S$GLB,, | Performed by: FAMILY MEDICINE

## 2019-09-12 PROCEDURE — 99999 PR PBB SHADOW E&M-EST. PATIENT-LVL I: CPT | Mod: PBBFAC,,,

## 2019-09-12 PROCEDURE — 2024F 7 FLD RTA PHOTO EVC RTNOPTHY: CPT | Mod: S$GLB,,, | Performed by: FAMILY MEDICINE

## 2019-09-12 PROCEDURE — 92250 FUNDUS PHOTOGRAPHY W/I&R: CPT | Mod: TC,S$GLB,, | Performed by: FAMILY MEDICINE

## 2019-09-12 PROCEDURE — 99999 PR PBB SHADOW E&M-EST. PATIENT-LVL I: ICD-10-PCS | Mod: PBBFAC,,,

## 2019-09-12 PROCEDURE — 3008F PR BODY MASS INDEX (BMI) DOCUMENTED: ICD-10-PCS | Mod: CPTII,S$GLB,, | Performed by: FAMILY MEDICINE

## 2019-09-12 PROCEDURE — 92250 FUNDUS PHOTOGRAPHY W/I&R: CPT | Mod: 26,S$GLB,, | Performed by: OPTOMETRIST

## 2019-09-12 PROCEDURE — 99999 PR PBB SHADOW E&M-EST. PATIENT-LVL V: ICD-10-PCS | Mod: PBBFAC,,, | Performed by: FAMILY MEDICINE

## 2019-09-12 PROCEDURE — 92250 DIABETIC EYE SCREENING PHOTO: ICD-10-PCS | Mod: TC,S$GLB,, | Performed by: FAMILY MEDICINE

## 2019-09-12 PROCEDURE — 99999 PR PBB SHADOW E&M-EST. PATIENT-LVL V: CPT | Mod: PBBFAC,,, | Performed by: FAMILY MEDICINE

## 2019-09-12 PROCEDURE — 3008F BODY MASS INDEX DOCD: CPT | Mod: CPTII,S$GLB,, | Performed by: FAMILY MEDICINE

## 2019-09-12 PROCEDURE — 3044F HG A1C LEVEL LT 7.0%: CPT | Mod: CPTII,S$GLB,, | Performed by: FAMILY MEDICINE

## 2019-09-12 PROCEDURE — 99214 PR OFFICE/OUTPT VISIT, EST, LEVL IV, 30-39 MIN: ICD-10-PCS | Mod: S$GLB,,, | Performed by: FAMILY MEDICINE

## 2019-09-12 PROCEDURE — 2024F PR 7 FIELD PHOTOS WITH INTERP/ REVIEW: ICD-10-PCS | Mod: S$GLB,,, | Performed by: FAMILY MEDICINE

## 2019-09-12 PROCEDURE — 92250 DIABETIC EYE SCREENING PHOTO: ICD-10-PCS | Mod: 26,S$GLB,, | Performed by: OPTOMETRIST

## 2019-09-12 PROCEDURE — 99214 OFFICE O/P EST MOD 30 MIN: CPT | Mod: S$GLB,,, | Performed by: FAMILY MEDICINE

## 2019-09-12 RX ORDER — CODEINE PHOSPHATE AND GUAIFENESIN 10; 100 MG/5ML; MG/5ML
5 SOLUTION ORAL 3 TIMES DAILY PRN
COMMUNITY
End: 2019-11-14

## 2019-09-12 RX ORDER — BENZONATATE 100 MG/1
100 CAPSULE ORAL 3 TIMES DAILY PRN
COMMUNITY
End: 2019-11-14

## 2019-09-12 RX ORDER — INSULIN PUMP SYRINGE, 3 ML
EACH MISCELLANEOUS
Qty: 1 EACH | Refills: 0 | Status: SHIPPED | OUTPATIENT
Start: 2019-09-12 | End: 2020-07-06

## 2019-09-12 RX ORDER — PROCHLORPERAZINE MALEATE 10 MG
10 TABLET ORAL 3 TIMES DAILY
COMMUNITY
End: 2020-01-30

## 2019-09-12 RX ORDER — LANCETS
EACH MISCELLANEOUS
Qty: 100 EACH | Refills: 3 | Status: SHIPPED | OUTPATIENT
Start: 2019-09-12 | End: 2019-11-14

## 2019-09-12 RX ORDER — ESCITALOPRAM OXALATE 5 MG/1
5 TABLET ORAL DAILY
Qty: 90 TABLET | Refills: 3 | Status: SHIPPED | OUTPATIENT
Start: 2019-09-12 | End: 2020-03-23 | Stop reason: SDUPTHER

## 2019-09-12 NOTE — PROGRESS NOTES
Leanna García is a 54 y.o. female here for a diabetic eye screening with non-dilated fundus photos per Dr. Lopez    Patient cooperative yes  Small pupils no  Last eye exam: 2018    For exam results, see Encounter Report.

## 2019-09-12 NOTE — PROGRESS NOTES
Subjective:   Patient ID: Leanna García is a 54 y.o. female     Chief Complaint:Follow-up (6 months)      Patient for checkup.  Patient doing well.    Review of Systems   Respiratory: Negative for shortness of breath.    Cardiovascular: Negative for chest pain.   Gastrointestinal: Negative for abdominal pain.   Genitourinary: Negative for dysuria.     Past Medical History:   Diagnosis Date    A-fib     resolved per patient    Arthritis     Bronchitis     Cardiovascular event risk, ASCVD 10-year risk 6.7% 10/15/2016    Diabetes mellitus     Diabetes mellitus type II     Encounter for blood transfusion     History of colon polyps 11/2/2016    Hyperlipidemia     Hypertension     Stroke      Objective:     Vitals:    09/12/19 1322   BP: 134/70   Pulse: 66   Temp: 97.9 °F (36.6 °C)     Body mass index is 30.6 kg/m².  Physical Exam   Neck: Neck supple. No tracheal deviation present.   Cardiovascular: Normal rate, regular rhythm and normal heart sounds.   Pulmonary/Chest: Effort normal. No respiratory distress.   Musculoskeletal: Normal range of motion. She exhibits no edema.     Assessment:     1. Hyperlipidemia associated with type 2 diabetes mellitus    2. Uncontrolled type 2 diabetes mellitus with stage 3 chronic kidney disease, without long-term current use of insulin    3. MANJINDER (generalized anxiety disorder)      Plan:   Hyperlipidemia associated with type 2 diabetes mellitus  -     Lipid panel; Future; Expected date: 09/12/2019  -     Comprehensive metabolic panel; Future; Expected date: 09/12/2019  -     Hemoglobin A1c; Future; Expected date: 09/12/2019    Uncontrolled type 2 diabetes mellitus with stage 3 chronic kidney disease, without long-term current use of insulin  -     Lipid panel; Future; Expected date: 09/12/2019  -     Comprehensive metabolic panel; Future; Expected date: 09/12/2019  -     Hemoglobin A1c; Future; Expected date: 09/12/2019  -     blood-glucose meter kit; To check BG 1  times daily, to use with insurance preferred meter  Dispense: 1 each; Refill: 0  -     lancets Misc; To check BG 1 times daily, to use with insurance preferred meter  Dispense: 100 each; Refill: 3  -     blood sugar diagnostic Strp; To check BG 1 times daily, to use with insurance preferred meter  Dispense: 100 strip; Refill: 3  -     Diabetic Eye Screening Photo; Future    MANJINDER (generalized anxiety disorder)  -     escitalopram oxalate (LEXAPRO) 5 MG Tab; Take 1 tablet (5 mg total) by mouth once daily.  Dispense: 90 tablet; Refill: 3    Screening for breast cancer  -     Mammo Digital Screening Bilateral With CAD; Future; Expected date: 09/12/2019    Screening for colon cancer  -     Ambulatory referral to Gastroenterology        Time spent with patient: 15 minutes and over half of that time was spent on counseling an coordination of care.    Stefano Lopez MD  09/12/2019    Portions of this note have been dictated with BRIAN Perdomo

## 2019-10-02 ENCOUNTER — TELEPHONE (OUTPATIENT)
Dept: FAMILY MEDICINE | Facility: CLINIC | Age: 54
End: 2019-10-02

## 2019-10-02 NOTE — TELEPHONE ENCOUNTER
----- Message from Sofie Taylor sent at 10/2/2019  8:54 AM CDT -----    Type:  Test Results    Who Called:  pt  Name of Test (Lab/Mammo/Etc): eye test // per pt  Best Call Back Number:  [phone]  Additional Information:   Test  results

## 2019-10-02 NOTE — TELEPHONE ENCOUNTER
"Advised pt per noted on 09/13/19: "The eye camera was unable to take a quality photo of your retina and thus our recommendation is to follow up with your eye doctor."    Pt verbalized understanding.     "

## 2019-10-04 ENCOUNTER — TELEPHONE (OUTPATIENT)
Dept: FAMILY MEDICINE | Facility: CLINIC | Age: 54
End: 2019-10-04

## 2019-10-04 DIAGNOSIS — R92.8 ABNORMAL MAMMOGRAM: Primary | ICD-10-CM

## 2019-10-04 NOTE — TELEPHONE ENCOUNTER
Per telephone encounter on 10/04/19 - mammo showed an abnormality on right breast and need addition imaging.    Provider requested diagnostic mammo and US. The patient stated she can only afford one. She's wondering if she can get either or done. Please advise.

## 2019-10-04 NOTE — TELEPHONE ENCOUNTER
----- Message from Ivis Long sent at 10/4/2019  3:07 PM CDT -----  Contact: pt  Pt would like to be called back regarding her us and mammo - pt states that she cannot do both cannot afford   Pt can be reached at [phone]

## 2019-10-07 NOTE — TELEPHONE ENCOUNTER
Advised pt message from provider. Number to patient assistance program given. Advised pt to give them a call. Advised that I will contact Radiology Department in regards to her concerns and will reach out once we get further infos. Understanding verbalized.     To Radiology:  The patient is having financial restrictions and wondering if she needs both US and mammo for the right breast or can she get either or procedure done. Please advise.

## 2019-10-07 NOTE — TELEPHONE ENCOUNTER
I will defer this to radiology   Also please give her information for patient assistance anette   And I will place a referral to outpattient

## 2019-10-08 ENCOUNTER — TELEPHONE (OUTPATIENT)
Dept: FAMILY MEDICINE | Facility: CLINIC | Age: 54
End: 2019-10-08

## 2019-10-08 NOTE — TELEPHONE ENCOUNTER
Per lab note on 10/03/19 - pt needs appt in next 4 weeks to discuss diabetes and cholesterol control.    Pt stated that she lives far and she has difficulty with transportation. She doesn't understands why this matter cannot be discussed over the phone. Pt stated she can come in but it won't be in the next 4 weeks, it will be a while before she can make it into the clinic. Please advise.

## 2019-10-08 NOTE — TELEPHONE ENCOUNTER
Spoke to patient in regards of setting up an appointment to establish care with PCP. Patient states she doesn't have transportation. Advised patient to est care with pcp closer to home in [facility]. Patient stated that she seen all the providers in [facility] and does not like any of them.    Informed patient that case management will give her a call to further help with establishing care.

## 2019-10-08 NOTE — TELEPHONE ENCOUNTER
----- Message from Sue Garcia sent at 10/8/2019 11:15 AM CDT -----  Contact: Patient  Type: Needs Medical Advice    Who Called:  Patient  Best Call Back Number:   Additional Information: Calling to speak to the nurse. Wanting clarification on why her ultrasound was cancelled.

## 2019-10-08 NOTE — TELEPHONE ENCOUNTER
"Advised pt that per Radiologist, only "right diagnostic mammogram with magnification views" is needed. Understanding verbalized.   "

## 2019-10-08 NOTE — TELEPHONE ENCOUNTER
Since she is having transportation issues which she like to establish care with 1 of her [facility] providers in the [facility]?

## 2019-10-09 ENCOUNTER — TELEPHONE (OUTPATIENT)
Dept: FAMILY MEDICINE | Facility: CLINIC | Age: 54
End: 2019-10-09

## 2019-10-09 NOTE — TELEPHONE ENCOUNTER
----- Message from Cindy Celis sent at 10/9/2019  7:53 AM CDT -----  Contact: pt [phone]  Patient called she wanted you to know she had some abnormality show up in her Mammogram she has some scar tissues noted.

## 2019-10-09 NOTE — TELEPHONE ENCOUNTER
Pt would like to let us know that she used to have port in her chest and it's been removed. This may possible be what they saw on her last mammogram. I advised that repeat mammo is scheduled for further investigation and we will let her know the result. Understanding verbalized.

## 2019-10-09 NOTE — TELEPHONE ENCOUNTER
"----- Message from Des Benjamin sent at 10/9/2019 10:03 AM CDT -----  Contact: [facility]  Type: Needs Medical Advice    Who Called:  [employer] [facility] -Christy Ledezma  Symptoms (please be specific):    How long has patient had these symptoms:    Pharmacy name and phone #:   Best Call Back Number: [phone]  Additional Information: The care manager called stating " pt states she has stop taking blood pressure medication". The care manager asking if the doctor want to prescribed a rx for the patient.    "

## 2019-10-09 NOTE — TELEPHONE ENCOUNTER
Spoke to patient, she stated she's not taking Imdur and Plavix because it's getting expensive. Went down medication list with her and she says she's taking metoprolol. She is not sure about nifedipine.    Called Joni Harrison . LM to call back.Need to clarify which medications that the patient advised her that she's not taking.

## 2019-10-11 ENCOUNTER — OUTPATIENT CASE MANAGEMENT (OUTPATIENT)
Dept: ADMINISTRATIVE | Facility: OTHER | Age: 54
End: 2019-10-11

## 2019-10-11 NOTE — PROGRESS NOTES
"Received referral from JANETT Ritter for inability to pay for needed studies.  Contacted pt to complete assessment.  Pt is currently at dialysis but is able to answer a few questions.  Partial assessment completed with pt.  Pt lives in [facility] with her spouse; spouse is employed full time.  Pt doesn't drive and does not have a transportation benefit through [employer].  Pt uses "public transportation" to dialysis but states the service doesn't go outside of [facility].  The only other transportation option is cab or Uber/Lyft which would be costly.  Pt is getting off dialysis; requests call back Monday 8:30am.  "

## 2019-10-14 NOTE — PROGRESS NOTES
Summary:  Completed assessment with pt telephonically.  Pts primary concern is transportation to physician appointments outside of [facility].  Pt uses [facility] to dialysis; spouse picks her up on his way home from work.  Spouse and pts parents provide transportation to medical appointments outside of [facility] as able (spouse is employed full time and parents live in [place]).  Pt expresses frustration that PCP/staff want to see her in clinic rather than telephonically.  This LCSW educated pt that PCP/staff are concerned about pts health, multiple chronic conditions that need to be monitored, especially when/if pt informs staff she isn't taking medications as prescribed as she recently divulged to PCP/staff.  Educated pt on importance of attending apointments for PCP to address health/chronic conditions.  Mentioned possibility of Palliative Care but pt had to end call as transportation was there to take pt to dialysis.     Intervention:  Provide support    Plan:  Follow up with pt 10/15/19 to continue discussion    Todays OPCM Self-Management Care Plan was developed with the patients/caregivers input and was based on identified barriers from todays assessment.  Goals were written today with the patient/caregiver and the patient has agreed to work towards these goals to improve his/her overall well-being. Patient verbalized understanding of the care plan, goals, and all of today's instructions. Encouraged patient/caregiver to communicate with his/her physician and health care team about health conditions and the treatment plan.  Provided my contact information today and encouraged patient/caregiver to call me with any questions as needed.

## 2019-10-15 ENCOUNTER — TELEPHONE (OUTPATIENT)
Dept: FAMILY MEDICINE | Facility: CLINIC | Age: 54
End: 2019-10-15

## 2019-10-15 NOTE — PROGRESS NOTES
Summary:  Follow up call made with pt.  This LCSW and pt contacted [facility] to have pt screened for eligibility.  During screening, pt declined to provide spouse's income per spouse's request, therefore pt is not eligible for assistance.  Pt declines referral to PAP for same reason stating her spouse does not want her to provide his income information.  Transportation outside of [facility] continues to be a barrier for care.  Pt does not want to change to PCP in [facility].  Only other option for transportation is a cab.  Educated pt on importance of attending medical appointments.      Interventions:  Conference call with pt to [facility]  Provide support  In-basket message to PCP/pool re: above; inquired about possibility of telemedicine    Plan:  Close case    Todays OPCM Self-Management Care Plan was developed with the patients/caregivers input and was based on identified barriers from todays assessment.  Goals were written today with the patient/caregiver and the patient has agreed to work towards these goals to improve his/her overall well-being. Patient verbalized understanding of the care plan, goals, and all of today's instructions. Encouraged patient/caregiver to communicate with his/her physician and health care team about health conditions and the treatment plan.  Provided my contact information today and encouraged patient/caregiver to call me with any questions as needed.

## 2019-10-17 NOTE — PROGRESS NOTES
[facility] Hospital Medicine  Progress Note    Patient Name: Leanna García  MRN: [id number]  Patient Class: OP- Observation   Admission Date: 2/9/2018  Length of Stay: 0 days  Attending Physician: Arslan Snyder MD  Primary Care Provider: Nolberto Lomax MD      Subjective:     Principal Problem:Hypertensive urgency    HPI:  Leanna García is a 51 y/o female with uncontrolled DM type 2 with CKD stage 4, HLD, and HTN, past history atrial fibrillation, presented to the ED with sudden onset L flank pain, described as sharp and constant, which awakened her from sleep early this morning.  She did experience nausea and had one episode of vomiting, but denies fever, chest pain, palpitations, SOB, weakness, diarrhea or constipation.  Denies trauma; denies new or unusual rash.  Pain was unrelieved by tylenol.  States that her BP was high, so she took her home meds this morning prior to arrival to the emergency department.  Currently, blood pressure remains elevated; however, she is asymptomatic.  She was given morphine IV in the ED and states pain level is currently 3/10; nausea has subsided. Patient had Ct scan concerning for pyelonephritis and placed in hospital for further evaluation and treatment.    Hospital Course:  The patient was monitored closely during her stay. She was given IVF for hydration. A urine culture was sent to the lab.  Nephrology was consulted. Patient was continued on Iv rocephin for possible pyelonephritis. She was noted to have hypertensive urgency. Her IVF were discontinued and she received titration of her antihypertensives. A renal artery ultrasound was ordered to evaluate for possible renal artery stenosis.     Interval History: Patient reports overall feeling better. Continue Iv abx. Bp still high. Discussed with Dr. Snyder.  Blood pressure medications titrated and pt started on ACEI. Ultrasound to evaluate for renal artery stenosis ordered.        Review of Systems    Constitutional: Positive for fatigue. Negative for activity change, appetite change, chills and fever.   HENT: Negative for congestion, ear pain, facial swelling, sinus pressure and sore throat.    Eyes: Negative for pain and redness.   Respiratory: Negative for apnea, cough, choking, chest tightness, shortness of breath, wheezing and stridor.    Cardiovascular: Negative for chest pain, palpitations and leg swelling.   Gastrointestinal: Positive for nausea. Negative for abdominal distention, abdominal pain ( ), blood in stool, constipation, diarrhea and vomiting ( ).   Endocrine: Negative for polydipsia and polyphagia.   Genitourinary: Negative for flank pain ( ), hematuria and urgency. Frequency:     Musculoskeletal: Positive for back pain. Negative for gait problem, neck pain and neck stiffness.        Pt reports chronic LBP midline   Skin: Negative for color change.   Allergic/Immunologic: Negative for food allergies.   Neurological: Negative for seizures, facial asymmetry, speech difficulty and weakness.   Hematological: Does not bruise/bleed easily.   Psychiatric/Behavioral: Negative for agitation, behavioral problems, confusion and suicidal ideas. The patient is nervous/anxious.      Objective:     Vital Signs (Most Recent):  Temp: 96.6 °F (35.9 °C) (02/10/18 1551)  Pulse: 66 (02/10/18 1551)  Resp: 18 (02/10/18 1551)  BP: (!) 155/72 (02/10/18 1551)  SpO2: (!) 94 % (02/10/18 1551) Vital Signs (24h Range):  Temp:  [96.4 °F (35.8 °C)-99 °F (37.2 °C)] 96.6 °F (35.9 °C)  Pulse:  [58-73] 66  Resp:  [16-18] 18  SpO2:  [88 %-95 %] 94 %  BP: (113-195)/(54-91) 155/72     Weight: 89 kg (196 lb 3.4 oz)  Body mass index is 28.98 kg/m².    Physical Exam   Constitutional: She is oriented to person, place, and time. She appears well-developed and well-nourished. No distress.   HENT:   Head: Normocephalic and atraumatic.   Eyes: Conjunctivae and EOM are normal. Pupils are equal, round, and reactive to light. Right eye  exhibits no discharge. Left eye exhibits no discharge.   Neck: Normal range of motion. Neck supple. No JVD present.   Cardiovascular: Normal rate, regular rhythm, normal heart sounds and intact distal pulses.    No murmur heard.  Pulmonary/Chest: Effort normal and breath sounds normal. No stridor. No respiratory distress.   Abdominal: Soft. Bowel sounds are normal. She exhibits no distension. Tenderness:   There is no guarding.   Genitourinary:   Genitourinary Comments: Not examined   Musculoskeletal: Normal range of motion. She exhibits no edema.   Neurological: She is alert and oriented to person, place, and time. No cranial nerve deficit.   Skin: Skin is warm and dry. She is not diaphoretic.   Psychiatric: She has a normal mood and affect. Her behavior is normal. Judgment and thought content normal.         CRANIAL NERVES     CN III, IV, VI   Pupils are equal, round, and reactive to light.  Extraocular motions are normal.      Labs: Reviewed    Assessment/Plan:      * Hypertensive urgency    Tele monitor  VS q4hr  Continue home meds  Labetalol 20mg IV prn SBP >180            Pyelonephritis    Nephrology following  Pt remains on Iv rocephin  Urine culture pending           Hypoxemia    Atelectasis-  Staff nurse reported O2 sats down to 88 % on room air earlier today  Pt denies any complaints of SOB  Cxr shows atelectasis  Add I.S. q 1 hour while awake  HL IVF        Familial hypercholesteremia, baseline                 Iron deficiency anemia secondary to inadequate dietary iron intake     Add ferrous sulfate and pm vitamin C          Obesity (BMI 30.0-34.9)    Encourage diet compliance and increased activity as tolerated          MANJINDER (generalized anxiety disorder)    Continue prn Xanax           CKD (chronic kidney disease) stage 3, GFR 30-59 ml/min    Stage 3-4  Nephrology following  Renal dose all medications          Hypertension associated with diabetes    Hypertensive Urgency/ Hx uncontrolled  HTN-  Continue home bblockade  Home procardia increased and patient started on ACEI   Check ultrasound to evaluate for possible renal artery stenosis  Labetalol 20mg IV q2hr prn SBP>180             Uncontrolled type 2 diabetes mellitus with stage 3 chronic kidney disease, without long-term current use of insulin     DM diet  Accuchecks with correctional SSI    HGA1C is 7.5  Blood sugars running   Home glucotrol currently on hold          Hyperlipidemia associated with type 2 diabetes mellitus    Continue home statin and fenofibrate             VTE Risk Mitigation         Ordered     enoxaparin injection 30 mg  Daily     Route:  Subcutaneous        02/10/18 1542     Medium Risk of VTE  Once      02/09/18 1016          CARMELA Mosher  Department of Hospital Medicine   [facility]    Time spent seeing patient( greater than 1/2 spent in direct contact) : 42 minutes   retired

## 2019-10-18 NOTE — TELEPHONE ENCOUNTER
----- Message from Liz Johnson LCSW sent at 10/15/2019  9:03 AM CDT -----  Contact: Liz Johnson LCSW  Good morning Dr. Lopez,  I am a  with [facility].  I received a referral on the above pt for inability to pay for needed studies.  Per communication in chart, pt is also having difficulty paying for some medications and has transportation problems.  Pt states her spouse will not provide his income information as required by [facility] and [facility], therefore she is not eligible for assistance.  Pt has transportation service for [facility] only; her spouse and parents provide transportation as able to appointments outside of [facility].  She does not want to change to a provider in [facility].  Is it possible to do telemedicine with pt?  Unfortunately, there isn't much else we can do to assist.  I educated pt on the importance of seeing her PCP as needed to address her medical needs and chronic conditions.    Thank you,  Liz  [phone]  
Called and informed pt of msg per provider. Pt stated she will need time to think about this and call the clinic back on Monday with her decision.   
Patient has too many medical comorbidities to address it and a telemedicine visit.  She will either need to find transportation or switch to a closer primary care physician.  
Please advise regarding message below.   
500 ml

## 2019-10-23 ENCOUNTER — TELEPHONE (OUTPATIENT)
Dept: FAMILY MEDICINE | Facility: CLINIC | Age: 54
End: 2019-10-23

## 2019-10-23 NOTE — TELEPHONE ENCOUNTER
----- Message from Maryanne Jnoes sent at 10/23/2019  8:23 AM CDT -----  Contact: Leanna pt  Type: Needs Medical Advice    Who Called:  Leanna  Symptoms (please be specific):  A1C level  Best Call Back Number: [phone]  Additional Information: Pls call Leanna regarding her labs for her A1C.  She had labs done and it went down.

## 2019-10-23 NOTE — TELEPHONE ENCOUNTER
Patient states he dialysis clinic does regular checks of her A1C and it is now reading 7.9 as compared to her previous reading with [facility] of 8.2. She also states her kidney doctor states they can check her cholesterol over there as well if Dr. Lopez gives the order for it to save her from making extra trips to our clinic.

## 2019-10-23 NOTE — TELEPHONE ENCOUNTER
Patient needs an appointment in the next 2 weeks to address her blood sugar.  Please assist with scheduling appointment to see Betsey

## 2019-10-24 ENCOUNTER — PATIENT OUTREACH (OUTPATIENT)
Dept: ADMINISTRATIVE | Facility: HOSPITAL | Age: 54
End: 2019-10-24

## 2019-10-30 ENCOUNTER — TELEPHONE (OUTPATIENT)
Dept: FAMILY MEDICINE | Facility: CLINIC | Age: 54
End: 2019-10-30

## 2019-10-30 DIAGNOSIS — R92.1 MAMMOGRAPHIC CALCIFICATION FOUND ON DIAGNOSTIC IMAGING OF BREAST: ICD-10-CM

## 2019-10-30 DIAGNOSIS — N63.0 BREAST MASS IN FEMALE: Primary | ICD-10-CM

## 2019-10-30 NOTE — TELEPHONE ENCOUNTER
----- Message from Sue Garcia sent at 10/30/2019  2:31 PM CDT -----  Contact: Patient  Type: Needs Medical Advice    Who Called:  Patient  Best Call Back Number:   Additional Information: Calling to speak to the nurse. Patient is saying she is supposed to be scheduled for a biopsy and is wanting to know if she can get it done on the same day as her appointment.

## 2019-10-30 NOTE — TELEPHONE ENCOUNTER
Pt is wondering if she can get her biopsy done on the same day as her appointment on 11/07/19 with JANETT Ritter. Please contact patient.

## 2019-10-31 ENCOUNTER — TELEPHONE (OUTPATIENT)
Dept: RADIOLOGY | Facility: HOSPITAL | Age: 54
End: 2019-10-31

## 2019-11-01 ENCOUNTER — TELEPHONE (OUTPATIENT)
Dept: FAMILY MEDICINE | Facility: CLINIC | Age: 54
End: 2019-11-01

## 2019-11-01 NOTE — TELEPHONE ENCOUNTER
R/S appt from 11/07/19 to 11/14/19 at 10am with PA Breakfield per patient's requesting. Understanding verbalized to location, date and time.

## 2019-11-01 NOTE — TELEPHONE ENCOUNTER
..Patient notified of right breast biopsy is scheduled on  11/14/2019.   She is on Plavix and Aspirin 81 mg.   Request holding medications 3-5 days prior to procedure

## 2019-11-01 NOTE — TELEPHONE ENCOUNTER
----- Message from Sue Garcia sent at 11/1/2019  1:43 PM CDT -----  Contact: Patient  Type: Needs Medical Advice    Who Called:  Patient  Best Call Back Number:   Additional Information: Patient calling to find out if she can move her appointment on 11/7/19 to the same day as her biopsy on 11/14/19. Not giving any available appointments that day.

## 2019-11-07 NOTE — TELEPHONE ENCOUNTER
Patient instructed to hold Plavix only 4 days prior to breast biopsy and continue aspirin per Dr Mason.   States understanding.

## 2019-11-13 ENCOUNTER — DOCUMENTATION ONLY (OUTPATIENT)
Dept: FAMILY MEDICINE | Facility: CLINIC | Age: 54
End: 2019-11-13

## 2019-11-13 NOTE — PROGRESS NOTES
Pre-Visit Chart Review  For Appointment Scheduled on (11/14/19)    Health Maintenance Due   Topic Date Due    TETANUS VACCINE  08/12/1983    Pneumococcal Vaccine (Highest Risk) (2 of 3 - PPSV23) 12/10/2016    Colonoscopy  10/05/2017    Foot Exam  03/23/2019

## 2019-11-14 ENCOUNTER — OFFICE VISIT (OUTPATIENT)
Dept: FAMILY MEDICINE | Facility: CLINIC | Age: 54
End: 2019-11-14
Payer: MEDICARE

## 2019-11-14 ENCOUNTER — HOSPITAL ENCOUNTER (OUTPATIENT)
Dept: RADIOLOGY | Facility: HOSPITAL | Age: 54
Discharge: HOME | End: 2019-11-14
Attending: PHYSICIAN ASSISTANT
Payer: MEDICARE

## 2019-11-14 VITALS
DIASTOLIC BLOOD PRESSURE: 78 MMHG | BODY MASS INDEX: 30.71 KG/M2 | TEMPERATURE: 98 F | HEIGHT: 68 IN | OXYGEN SATURATION: 97 % | HEART RATE: 62 BPM | SYSTOLIC BLOOD PRESSURE: 152 MMHG | WEIGHT: 202.63 LBS

## 2019-11-14 DIAGNOSIS — N18.6 STAGE 5 CHRONIC KIDNEY DISEASE ON CHRONIC DIALYSIS: ICD-10-CM

## 2019-11-14 DIAGNOSIS — R92.8 ABNORMAL MAMMOGRAM OF RIGHT BREAST: ICD-10-CM

## 2019-11-14 DIAGNOSIS — E11.69 HYPERLIPIDEMIA ASSOCIATED WITH TYPE 2 DIABETES MELLITUS: ICD-10-CM

## 2019-11-14 DIAGNOSIS — E11.29 TYPE 2 DIABETES MELLITUS WITH OTHER DIABETIC KIDNEY COMPLICATION, WITHOUT LONG-TERM CURRENT USE OF INSULIN: Primary | ICD-10-CM

## 2019-11-14 DIAGNOSIS — Z99.2 STAGE 5 CHRONIC KIDNEY DISEASE ON CHRONIC DIALYSIS: ICD-10-CM

## 2019-11-14 DIAGNOSIS — E78.5 HYPERLIPIDEMIA ASSOCIATED WITH TYPE 2 DIABETES MELLITUS: ICD-10-CM

## 2019-11-14 DIAGNOSIS — L89.891: ICD-10-CM

## 2019-11-14 PROBLEM — N12 PYELONEPHRITIS: Status: RESOLVED | Noted: 2018-02-10 | Resolved: 2019-11-14

## 2019-11-14 PROBLEM — Z79.4 TYPE 2 DIABETES MELLITUS WITH KIDNEY COMPLICATION, WITH LONG-TERM CURRENT USE OF INSULIN: Status: RESOLVED | Noted: 2018-04-25 | Resolved: 2019-11-14

## 2019-11-14 PROBLEM — N17.0 ACUTE RENAL FAILURE WITH ACUTE TUBULAR NECROSIS SUPERIMPOSED ON STAGE 4 CHRONIC KIDNEY DISEASE: Status: RESOLVED | Noted: 2018-04-24 | Resolved: 2019-11-14

## 2019-11-14 PROBLEM — N18.4 ACUTE RENAL FAILURE WITH ACUTE TUBULAR NECROSIS SUPERIMPOSED ON STAGE 4 CHRONIC KIDNEY DISEASE: Status: RESOLVED | Noted: 2018-04-24 | Resolved: 2019-11-14

## 2019-11-14 PROCEDURE — 76098 X-RAY EXAM SURGICAL SPECIMEN: CPT | Mod: 26,59,NTX, | Performed by: RADIOLOGY

## 2019-11-14 PROCEDURE — 88305 TISSUE EXAM BY PATHOLOGIST: CPT | Mod: NTX | Performed by: PATHOLOGY

## 2019-11-14 PROCEDURE — 99214 PR OFFICE/OUTPT VISIT, EST, LEVL IV, 30-39 MIN: ICD-10-PCS | Mod: 25,S$GLB,, | Performed by: PHYSICIAN ASSISTANT

## 2019-11-14 PROCEDURE — G0009 ADMIN PNEUMOCOCCAL VACCINE: HCPCS | Mod: S$GLB,,, | Performed by: PHYSICIAN ASSISTANT

## 2019-11-14 PROCEDURE — 2024F PR 7 FIELD PHOTOS WITH INTERP/ REVIEW: ICD-10-PCS | Mod: S$GLB,,, | Performed by: PHYSICIAN ASSISTANT

## 2019-11-14 PROCEDURE — 88305 TISSUE EXAM BY PATHOLOGIST: ICD-10-PCS | Mod: 26,NTX,, | Performed by: PATHOLOGY

## 2019-11-14 PROCEDURE — G0009 PNEUMOCOCCAL POLYSACCHARIDE VACCINE 23-VALENT =>2YO SQ IM: ICD-10-PCS | Mod: S$GLB,,, | Performed by: PHYSICIAN ASSISTANT

## 2019-11-14 PROCEDURE — 90732 PNEUMOCOCCAL POLYSACCHARIDE VACCINE 23-VALENT =>2YO SQ IM: ICD-10-PCS | Mod: S$GLB,,, | Performed by: PHYSICIAN ASSISTANT

## 2019-11-14 PROCEDURE — 99214 OFFICE O/P EST MOD 30 MIN: CPT | Mod: 25,S$GLB,, | Performed by: PHYSICIAN ASSISTANT

## 2019-11-14 PROCEDURE — 90732 PPSV23 VACC 2 YRS+ SUBQ/IM: CPT | Mod: S$GLB,,, | Performed by: PHYSICIAN ASSISTANT

## 2019-11-14 PROCEDURE — 19081 MAMMO BREAST STEREOTACTIC BREAST BIOPSY RIGHT: ICD-10-PCS | Mod: RT,NTX,, | Performed by: RADIOLOGY

## 2019-11-14 PROCEDURE — 3008F PR BODY MASS INDEX (BMI) DOCUMENTED: ICD-10-PCS | Mod: CPTII,S$GLB,, | Performed by: PHYSICIAN ASSISTANT

## 2019-11-14 PROCEDURE — 19081 BX BREAST 1ST LESION STRTCTC: CPT | Mod: RT,TXP

## 2019-11-14 PROCEDURE — 2024F 7 FLD RTA PHOTO EVC RTNOPTHY: CPT | Mod: S$GLB,,, | Performed by: PHYSICIAN ASSISTANT

## 2019-11-14 PROCEDURE — 25000003 PHARM REV CODE 250: Mod: NTX | Performed by: RADIOLOGY

## 2019-11-14 PROCEDURE — 99999 PR PBB SHADOW E&M-EST. PATIENT-LVL V: ICD-10-PCS | Mod: PBBFAC,,, | Performed by: PHYSICIAN ASSISTANT

## 2019-11-14 PROCEDURE — A4648 IMPLANTABLE TISSUE MARKER: HCPCS | Mod: NTX

## 2019-11-14 PROCEDURE — 88305 TISSUE EXAM BY PATHOLOGIST: CPT | Mod: 26,NTX,, | Performed by: PATHOLOGY

## 2019-11-14 PROCEDURE — 3008F BODY MASS INDEX DOCD: CPT | Mod: CPTII,S$GLB,, | Performed by: PHYSICIAN ASSISTANT

## 2019-11-14 PROCEDURE — 76098 MAMMO BREAST SPECIMEN: ICD-10-PCS | Mod: 26,59,NTX, | Performed by: RADIOLOGY

## 2019-11-14 PROCEDURE — 27201044 HC MAMMOTOME PROBE: Mod: NTX

## 2019-11-14 PROCEDURE — 76098 X-RAY EXAM SURGICAL SPECIMEN: CPT | Mod: TC,NTX

## 2019-11-14 PROCEDURE — 99999 PR PBB SHADOW E&M-EST. PATIENT-LVL V: CPT | Mod: PBBFAC,,, | Performed by: PHYSICIAN ASSISTANT

## 2019-11-14 PROCEDURE — 19081 BX BREAST 1ST LESION STRTCTC: CPT | Mod: RT,NTX,, | Performed by: RADIOLOGY

## 2019-11-14 RX ORDER — LANCETS 33 GAUGE
EACH MISCELLANEOUS
Refills: 3 | COMMUNITY
Start: 2019-09-12 | End: 2020-06-25

## 2019-11-14 RX ORDER — EZETIMIBE 10 MG/1
10 TABLET ORAL DAILY
Qty: 90 TABLET | Refills: 3 | Status: SHIPPED | OUTPATIENT
Start: 2019-11-14 | End: 2020-03-23 | Stop reason: SDUPTHER

## 2019-11-14 RX ORDER — PROMETHAZINE HYDROCHLORIDE AND DEXTROMETHORPHAN HYDROBROMIDE 6.25; 15 MG/5ML; MG/5ML
SYRUP ORAL
Refills: 0 | COMMUNITY
Start: 2019-10-29 | End: 2020-01-30

## 2019-11-14 RX ADMIN — LIDOCAINE HYDROCHLORIDE 30 ML: 10; .005 INJECTION, SOLUTION EPIDURAL; INFILTRATION; INTRACAUDAL; PERINEURAL at 12:11

## 2019-11-14 NOTE — PROGRESS NOTES
Subjective:       Patient ID: Leanna García is a 54 y.o. female.    Chief Complaint: Lab Results    Patient with type 2 diabetes mellitus, hypertension, end-stage renal disease on dialysis, hyperlipidemia presents for routine follow-up.  Patient had recent lab indicating cholesterol is not at goal.  She is compliant with her medications.  Recent A1c done here was 8.2.  She brings in another A1c which was done most recently at her dialysis center.  A1c is 7.9.  Patient has significant diabetic neuropathy with decreased sensation in the feet.  She is not followed by Podiatry.  She has had a lingering cough for the past 3 weeks.  She was seen at urgent care clinic.  She states that the cough is improving.  She has not had a fever.  Patients patient medical/surgical, social and family histories have been reviewed       Review of Systems   Constitutional: Negative for activity change, appetite change, chills, fatigue and fever.   HENT: Negative for congestion, ear pain, postnasal drip, rhinorrhea, sinus pressure, sneezing and voice change.    Respiratory: Positive for cough. Negative for chest tightness, shortness of breath and wheezing.    Cardiovascular: Negative for chest pain, palpitations and leg swelling.   Gastrointestinal: Negative for nausea and vomiting.   Neurological: Positive for numbness. Negative for dizziness, light-headedness and headaches.       Objective:      Physical Exam   Constitutional: She appears well-developed and well-nourished. She is cooperative. No distress.   HENT:   Head: Normocephalic and atraumatic.   Cardiovascular: Normal rate, regular rhythm and normal heart sounds.   Pulses:       Dorsalis pedis pulses are 1+ on the right side, and 1+ on the left side.   Pulmonary/Chest: Effort normal and breath sounds normal.   Musculoskeletal:        Right lower leg: She exhibits no edema.        Left lower leg: She exhibits no edema.        Right foot: There is decreased range of motion and  deformity.        Left foot: There is decreased range of motion and deformity.   Feet:   Right Foot:   Protective Sensation: 5 sites tested. 0 sites sensed.   Skin Integrity: Positive for ulcer, erythema and callus.   Left Foot:   Protective Sensation: 5 sites tested. 0 sites sensed.   Skin Integrity: Positive for ulcer, erythema and callus.   Neurological: She is alert.   Skin: Skin is warm and dry.       Assessment:       1. Type 2 diabetes mellitus with other diabetic kidney complication, without long-term current use of insulin    2. Stage 5 chronic kidney disease on chronic dialysis    3. Hyperlipidemia associated with type 2 diabetes mellitus        Plan:       Leanna was seen today for lab results.    Diagnoses and all orders for this visit:    Type 2 diabetes mellitus with other diabetic kidney complication, without long-term current use of insulin  -     Ambulatory referral to Podiatry  -     Ambulatory consult to Diabetic Education; Future    Stage 5 chronic kidney disease on chronic dialysis  Continue per nephro   Hyperlipidemia associated with type 2 diabetes mellitus  -    Add  ezetimibe (ZETIA) 10 mg tablet; Take 1 tablet (10 mg total) by mouth once daily.    Other orders  -     (In Office Administered) Pneumococcal Polysaccharide Vaccine (23 Valent) (SQ/IM)           Follow up in about 3 months (around 2/14/2020) for Follow-Up with PCP or me .   DISCLAIMER: This note was prepared with [employer] voice recognition transcription software. Garbled syntax, mangled pronouns, and other bizarre constructions may be attributed to that software system   Patient readiness: eager and barriers:social stressors, environmental and economic    During the course of the visit the patient was educated and counseled about the following:     Diabetes:  Reminded to bring in blood sugar diary at next visit.  Hypertension:   Medication: no change.  Obesity:   Diet interventions: qualitative changes (increase low-fat,  high-fiber  foods).    Goals: Diabetes: Maintain Hemoglobin A1C below 7, Hypertension: Reduce Blood Pressure and Obesity: Reduce calorie intake and BMI    Did patient meet goals/outcomes: Yes    The following self management tools provided: blood pressure log  blood glucose log    Patient Instructions (the written plan) was given to the patient/family.     Time spent with patient: 30 minutes    Barriers to medications present (no )    Adverse reactions to current medications (no)    Over the counter medications reviewed (Yes)

## 2019-11-14 NOTE — PROGRESS NOTES
Identified patient's name and . Administered Pneumovax 23 vaccine, IM. Patient tolerated well, aseptic technique maintained. Pain scale 0/10 with injection. Instructed patient to wait in the clinic for 15 minutes after the injection was given.

## 2019-11-15 ENCOUNTER — TELEPHONE (OUTPATIENT)
Dept: TRANSPLANT | Facility: CLINIC | Age: 54
End: 2019-11-15

## 2019-11-15 NOTE — TELEPHONE ENCOUNTER
----- Message from Otto Luu Jr., MD sent at 11/13/2019  3:11 PM CST -----  I think iliacs look acceptable. She should have iliac doppler US as part of her workup.   DS  ----- Message -----  From: Anish Prabhakar RN  Sent: 11/11/2019   2:08 PM CST  To: MD Dr Jus Feldman Jr.,    The above patient has been referred for kidney transplant evaluation. Can you please review the CT abd/pelvis 8/28/2018 and let me know if you thinks she is ok to come in to [facility].     Thanks,   Anish

## 2019-11-21 LAB
FINAL PATHOLOGIC DIAGNOSIS: NORMAL
GROSS: NORMAL

## 2019-12-03 ENCOUNTER — TELEPHONE (OUTPATIENT)
Dept: RADIOLOGY | Facility: HOSPITAL | Age: 54
End: 2019-12-03

## 2019-12-03 NOTE — TELEPHONE ENCOUNTER
..Patient notified of breast biopsy results.     Final Pathologic Diagnosis BREAST, RIGHT, 02:00 MIDDLE DEPTH, UPPER INNER QUADRANT, CALCIFICATIONS,   STEREOTACTIC-GUIDED BIOPSY:   - Benign breast tissue with fibrocystic changes, patchy chronic inflammation,   features suggestive of duct rupture and associated fibrosis, and columnar   cell change.   - Microcalcifications:  Seen in association with areas of duct   rupture/fibrosis.   - Negative for atypia or carcinoma.   - Numerous additional deeper sections have been examined.          Instructed to repeat diagnostic mammogram in 6  months

## 2019-12-10 ENCOUNTER — PATIENT MESSAGE (OUTPATIENT)
Dept: PODIATRY | Facility: CLINIC | Age: 54
End: 2019-12-10

## 2019-12-23 ENCOUNTER — TELEPHONE (OUTPATIENT)
Dept: TRANSPLANT | Facility: CLINIC | Age: 54
End: 2019-12-23

## 2019-12-23 DIAGNOSIS — Z76.82 ORGAN TRANSPLANT CANDIDATE: Primary | ICD-10-CM

## 2020-01-02 DIAGNOSIS — Z76.82 ORGAN TRANSPLANT CANDIDATE: Primary | ICD-10-CM

## 2020-01-07 ENCOUNTER — TELEPHONE (OUTPATIENT)
Dept: TRANSPLANT | Facility: CLINIC | Age: 55
End: 2020-01-07

## 2020-01-07 NOTE — TELEPHONE ENCOUNTER
"----- Message from Katie Reid RN sent at 1/7/2020  1:39 PM CST -----  Contact: Leanna       ----- Message -----  From: Lidia Barrow  Sent: 1/7/2020   1:30 PM CST  To: Abdirahman ALLEN Staff    Consult/Advisory:    Name Of Caller: KAYDEN  Provider Name:   Does patient feel the need to be seen today? No   Relationship to the Pt?: self   Contact Preference?: [phone]  What is the nature of the call?:  - pt has general questions, no urgent concerns.     Additional Notes:  "Thank you for all that you do for our patients'"      "

## 2020-01-09 ENCOUNTER — TELEPHONE (OUTPATIENT)
Dept: FAMILY MEDICINE | Facility: CLINIC | Age: 55
End: 2020-01-09

## 2020-01-09 NOTE — TELEPHONE ENCOUNTER
Patient stated that she wanted to switch glucose kits and she wanted a new glucometer, lancets, and strips. And Pt stated that her insurance will now not cover her supplies. Pt stated she still has a refill left of her other strips and about half of the strips left in the container (about 50). I instructed patient to call the pharmacy and double check with that more refill with pharmacy.

## 2020-01-09 NOTE — TELEPHONE ENCOUNTER
----- Message from Ilana David sent at 1/9/2020  9:22 AM CST -----  Contact: patient  Type: Needs Medical Advice    Who Called:  Patient  Pharmacy name and phone #:    [facility] - [place], [place] - [address]  [address] [address]  Phone: [phone] Fax: [phone]  Best Call Back Number: [phone]  Additional Information: Per patient would like to speak with someone regarding her test strips, she said she is not sure if the correct ones were entered on her chart at her last appointment and has had trouble with getting insurance to cover it-please advise-thank you

## 2020-01-23 ENCOUNTER — TELEPHONE (OUTPATIENT)
Dept: TRANSPLANT | Facility: CLINIC | Age: 55
End: 2020-01-23

## 2020-01-23 NOTE — TELEPHONE ENCOUNTER
----- Message from Merari Guevara sent at 1/23/2020  8:11 AM CST -----  Pt really want to speak to coordinator about whats going on with  her care taker so she can come in for her appts on 1/30 and 2/11 she stated she needs to notify the coordinator on his surgery        Pt contact [phone]

## 2020-01-23 NOTE — TELEPHONE ENCOUNTER
Returned patient's call. Answered questions. Patient verbalized understanding.       ----- Message from Merari Guevara sent at 1/23/2020  8:11 AM CST -----  Pt really want to speak to coordinator about whats going on with  her care taker so she can come in for her appts on 1/30 and 2/11 she stated she needs to notify the coordinator on his surgery        Pt contact [phone]

## 2020-01-23 NOTE — TELEPHONE ENCOUNTER
----- Message from Merari Guevara sent at 1/23/2020  8:11 AM CST -----  Pt really want to speak to coordinator about whats going on with  her care taker so she can come in for her appts on 1/30 and 2/11 she stated she needs to notify the coordinator on his surgery        Pt contact [phone]

## 2020-01-27 ENCOUNTER — TELEPHONE (OUTPATIENT)
Dept: TRANSPLANT | Facility: CLINIC | Age: 55
End: 2020-01-27

## 2020-01-29 ENCOUNTER — TELEPHONE (OUTPATIENT)
Dept: TRANSPLANT | Facility: CLINIC | Age: 55
End: 2020-01-29

## 2020-01-29 NOTE — TELEPHONE ENCOUNTER
Returned patient's call, answered questions. Patient verbalized understanding.     ----- Message from Danny Nugent sent at 1/29/2020 11:59 AM CST -----  Contact: Pt  Pt wants to know if she can take diabetes and highblood pressure meds before lab or after lab?      Pt# [phone]

## 2020-01-30 ENCOUNTER — OFFICE VISIT (OUTPATIENT)
Dept: TRANSPLANT | Facility: CLINIC | Age: 55
End: 2020-01-30
Payer: MEDICARE

## 2020-01-30 ENCOUNTER — TELEPHONE (OUTPATIENT)
Dept: TRANSPLANT | Facility: CLINIC | Age: 55
End: 2020-01-30

## 2020-01-30 VITALS
OXYGEN SATURATION: 100 % | HEIGHT: 68 IN | WEIGHT: 207.25 LBS | RESPIRATION RATE: 18 BRPM | SYSTOLIC BLOOD PRESSURE: 129 MMHG | TEMPERATURE: 98 F | DIASTOLIC BLOOD PRESSURE: 80 MMHG | BODY MASS INDEX: 31.41 KG/M2 | HEART RATE: 70 BPM

## 2020-01-30 DIAGNOSIS — Z01.818 PRE-TRANSPLANT EVALUATION FOR CHRONIC KIDNEY DISEASE: Primary | ICD-10-CM

## 2020-01-30 PROCEDURE — 99999 PR PBB SHADOW E&M-EST. PATIENT-LVL III: CPT | Mod: PBBFAC,TXP,,

## 2020-01-30 PROCEDURE — 99999 PR PBB SHADOW E&M-EST. PATIENT-LVL III: ICD-10-PCS | Mod: PBBFAC,TXP,,

## 2020-01-30 PROCEDURE — 99499 NO LOS: ICD-10-PCS | Mod: S$GLB,TXP,, | Performed by: NURSE PRACTITIONER

## 2020-01-30 PROCEDURE — 99499 UNLISTED E&M SERVICE: CPT | Mod: S$GLB,TXP,, | Performed by: NURSE PRACTITIONER

## 2020-01-30 RX ORDER — METOPROLOL TARTRATE 25 MG/1
25 TABLET, FILM COATED ORAL 2 TIMES DAILY
Status: ON HOLD | COMMUNITY
End: 2022-06-02

## 2020-01-30 RX ORDER — ERGOCALCIFEROL 1.25 MG/1
50000 CAPSULE ORAL
COMMUNITY
End: 2022-03-10

## 2020-01-30 RX ORDER — AMLODIPINE BESYLATE 10 MG/1
10 TABLET ORAL DAILY
COMMUNITY
End: 2022-05-04

## 2020-01-30 RX ORDER — PANTOPRAZOLE SODIUM 40 MG/1
40 TABLET, DELAYED RELEASE ORAL DAILY
COMMUNITY
End: 2021-05-24 | Stop reason: SDUPTHER

## 2020-01-30 RX ORDER — DOCUSATE SODIUM 100 MG/1
100 CAPSULE, LIQUID FILLED ORAL DAILY
COMMUNITY
End: 2021-05-19

## 2020-01-30 NOTE — PROGRESS NOTES
"Transplant Recipient Adult Psychosocial Suitability    Leanna García  [address]  [place] [address]  [phone]      SW met with pt and pt's  for an initial assessment in [facility]. SW verified pt's address and emergency contact. SW asked if pt had a back-up caregiver to pt's  and explained that a caregiver would be needed 24/7 for approximately 2-4 weeks post transplant. Pt and pt's  report that they don't believe that they have anyone who would be able to assist. Pt reports her parents are living in [place] and are not able to come because of their own health issues and pt reports that her sisters both work and are unable to come. Pt's  also reports that he is planning to have back surgery in the next few weeks and reports that he "can't hardly walk". SW noted when pt and  left that pt's  was having significant difficulties with walking. Pt's  also reports that if he does not work, then he does not get paid.     SW, pt, and pt's  came to the conclusion that it would be in pt's best interest to come back once pt and  were able to have a caregiver plan and their financial plan in place prior to returning for [facility]. Pt's  agreed and states, "Yeah, we didn't know it was going to be all of this. But we'll take this book home and read it and learn as much as we can so that we'll be ready when we come back".    HUNTER remains available to pt, pt's family, and transplant team at [phone].               Transplant Social Work - Candidacy  Assessment/Plan:     Psychosocial Suitability: Patient presents as an unacceptable candidate for kidney transplant at this time. Based on psychosocial risk factors, patient presents as high risk, due to no caregiver plan in place.    Recommendations/Additional Comments: HUNTER recommends that pt establish a solid caregiver plan prior to returning to [facility].      Mario Sanchez LCSW         "

## 2020-01-30 NOTE — LETTER
January 30, 2020      [facility]  [address]  [facility] [address]  Phone: [phone]       Patient: Leanna García   YOB: 1965  Date of Visit: 01/30/2020    To Whom It May Concern:    Deonte García  was at [facility] on 01/30/2020. Accompany by Donovan García may return to work/school on 1/31/2020 with no restrictions. If you have any questions or concerns, or if I can be of further assistance, please do not hesitate to contact me.    Sincerely,    Angelic Arnett LPN

## 2020-01-30 NOTE — TELEPHONE ENCOUNTER
Reviewed pt transplant labs.  Notified dialysis unit dietitian of the following abnormal labs via fax and requested their most recent nutrition note on this pt.  Once this note is received it will be scanned into pt's chart.    Chol 321    Glu 202  Phos 4.8

## 2020-01-30 NOTE — PROGRESS NOTES
PHARM.D. PRE-TRANSPLANT NOTE:    This patient's medication therapy was evaluated as part of her pre-transplant evaluation.      The following general pharmacologic concerns were noted: clopidogrel may increase risk of mychal-op bleeding;     The following pharmacologic concerns related to HCV therapy were noted: history of atrial fibrillation, no record of amiodarone      This patient's medication profile was reviewed for considerations for DAA Hepatitis C therapy:    [X]  No current inducers of CYP 3A4 or PGP  [X]  No amiodarone on this patient's EMR profile in the last 24 months      Current Outpatient Medications   Medication Sig Dispense Refill    albuterol (PROVENTIL/VENTOLIN HFA) 90 mcg/actuation inhaler Inhale 2 puffs into the lungs every 4 (four) hours as needed. (Patient taking differently: Inhale 1 puff into the lungs every 4 (four) hours as needed. ) 54 g 1    amLODIPine (NORVASC) 10 MG tablet Take 10 mg by mouth once daily.      ascorbic acid, vitamin C, (VITAMIN C) 500 MG tablet Take 1 tablet (500 mg total) by mouth every evening. (Patient taking differently: Take 500 mg by mouth once daily. )      aspirin (ECOTRIN) 81 MG EC tablet Take 1 tablet (81 mg total) by mouth once daily.      atorvastatin (LIPITOR) 80 MG tablet Take 1 tablet (80 mg total) by mouth once daily. 90 tablet 3    bisacodyl (DULCOLAX) 5 mg EC tablet Take 5 mg by mouth once daily.       blood sugar diagnostic Strp To check BG 2 times daily, to use with insurance preferred meter 200 strip 3    blood sugar diagnostic Strp To check BG 1 times daily, to use with insurance preferred meter 100 strip 3    blood-glucose meter kit To check BG 1 times daily, to use with insurance preferred meter 1 each 0    clopidogrel (PLAVIX) 75 mg tablet Take 1 tablet (75 mg total) by mouth once daily. 90 tablet 3    docusate sodium (COLACE) 100 MG capsule Take 100 mg by mouth once daily.      ergocalciferol (ERGOCALCIFEROL) 50,000 unit Cap Take  50,000 Units by mouth. Every 15 days      escitalopram oxalate (LEXAPRO) 5 MG Tab Take 1 tablet (5 mg total) by mouth once daily. 90 tablet 3    ezetimibe (ZETIA) 10 mg tablet Take 1 tablet (10 mg total) by mouth once daily. 90 tablet 3    fenofibrate 160 MG Tab Take 1 tablet (160 mg total) by mouth once daily. (Patient taking differently: Take 160 mg by mouth every evening. ) 90 tablet 3    gabapentin (NEURONTIN) 300 MG capsule Take 1 capsule (300 mg total) by mouth every evening. 90 capsule 3    glipiZIDE (GLUCOTROL) 2.5 MG tablet Take 2.5 mg by mouth daily with breakfast.      isosorbide mononitrate (IMDUR) 60 MG 24 hr tablet Take 1 tablet (60 mg total) by mouth once daily. 90 tablet 3    lactulose (CEPHULAC) 20 gram Pack Take 1 packet (20 g total) by mouth once daily. (Patient taking differently: Take 20 g by mouth daily as needed. ) 90 packet 3    metoprolol tartrate (LOPRESSOR) 25 MG tablet Take 25 mg by mouth 2 (two) times daily.      montelukast (SINGULAIR) 10 mg tablet Take 1 tablet (10 mg total) by mouth every evening. 90 tablet 1    multivitamin with minerals tablet Take 1 tablet by mouth once daily.      nitroGLYCERIN (NITROSTAT) 0.4 MG SL tablet Place 1 tablet (0.4 mg total) under the tongue as needed. 90 tablet 0    pantoprazole (PROTONIX) 40 MG tablet Take 40 mg by mouth once daily.      sucroferric oxyhydroxide (VELPHORO) 500 mg Chew Take 500 mg by mouth 3 (three) times daily with meals.       TRUEPLUS LANCETS 33 gauge Misc USE 1 TO CHECK GLUCOSE ONCE DAILY  3    walker (ULTRA-LIGHT ROLLATOR) Misc 1 Units by Misc.(Non-Drug; Combo Route) route once daily. 1 each 0    acetaminophen (TYLENOL) 325 MG tablet Take 325 mg by mouth every 6 (six) hours as needed for Pain (headache).       No current facility-administered medications for this visit.          Currently Ms García is responsible for preparing / administering this patient's medications on a daily basis.  I am available for  consultation and can be contacted, as needed by the other members of the Kidney Transplant team.

## 2020-01-31 ENCOUNTER — COMMITTEE REVIEW (OUTPATIENT)
Dept: TRANSPLANT | Facility: CLINIC | Age: 55
End: 2020-01-31

## 2020-01-31 NOTE — COMMITTEE REVIEW
Native Organ Dx:       Not approved for LRD/CAD transplant due to inadequate caregiver support and a solid financial plan. Patient may be re referred once she has established a solid caregiver and financial plan.       Note written by: Michelle Abadie, RN    ===============================================    I was present at the meeting and attest to the decision of the committee.    Greg Gary  01/31/2020

## 2020-01-31 NOTE — LETTER
January 31, 2020    Leanna García  [address]  [place] [address]             Dear Dr. Keyon Ramachandran, Stefano Lopez MD    Patient: Leanna García   MR Number: [id number]   YOB: 1965     A battery of tests must be done to determine if you are suitable health to undergo a kidney transplant.  All of the recommended studies must be completed and received by the transplant team before you can be presented to the transplant selection committee.  The committee will then decide if you are suitable transplant candidate.  The following studies need to be obtained at home:    ___Mammography: ICD-10 code Z12.31.    ___Gynecologic exam: The need for a pap smear will be determined by gynecologist.    ___Colonoscopy: This is a recommended screening tests for all adults over the age of 50, or those considered at risk for colon cancer.  ICD-10 code Z12.11.    ___Cardiac stress test: We request you to have a stress test to determine if you have any evidence of blockages in your heart.  We recommend a nuclear stress test or dobutamine stress echo.  ICD-10 code N19.    ___2-D echo with color flow doppler: We need to look at the heart valves and heart muscle, and determine pulmonary artery pressures.  ICD-10 code N19.    ___Cardiology consult: We are asking that your cardiologist clear you for transplant surgery and maximize your medical management.  We also need to note if there are special  management strategies that need to be used during your transplant event, especially since we routinely use IV fluids to help the new kidney function at its best.  Also, your heart doctor needs to know that the average wait for a kidney transplant can be as long as 3-5 years.  Therefore, we not only ask for a preoperative clearance, but also optimal management of your heart (for example: lipids, high blood pressure, heart failure, etc.).    You and your doctor should feel free to contact us at any time, if  there are questions or concerns about these tests or the transplant evaluation process.    Sincerely,        [facility] [place]  [facility] [place] [address]  [phone]    Cc: ***

## 2020-01-31 NOTE — LETTER
January 31, 2020    Leanna García  [address]  [place] [address]    Dear Leanna García:  MRN: [id number]    It is the duty of the [facility] Selection [facility] to determine which patients are candidates for a transplant. For this reason, our committee has the difficult task of evaluating patients to determine which ones have the greatest chance of having a successful transplant. We are aware of the magnitude of this responsibility, and we approach it with reverence and humility.    It is with regret I inform you that you are not approved as a transplant candidate due to inadequate caregiver support and a solid financial plan. Your future transplant appointments for 02/06/20 have been canceled.  Based on this review, we have determined that at this time, you are not a candidate for a transplant at [facility].      The selection committee carefully considers each patient's transplant candidacy and determines whether it is safe to proceed with transplantation on a case-by-case basis using established selection criteria.  At present, the risk of proceeding with an elective transplant surgery has become too high.                                                                               Although the selection committee believes you are not a suitable transplant candidate, you have the option to be evaluated at other transplant centers who may have different selection criteria.  You may request your [facility] records be sent to any center of your choice by contacting our Medical Records Department at [phone].                                                                               Attached is a letter from the United Network for Organ Sharing (UNOS).  It describes the services and information offered to patients by UNOS and the Organ Procurement and Transplant Network.    The [facility] sincerely wishes you the best and remains available to answer any questions.   Please do not hesitate to contact our pre-transplant office if we can assist you in any other way.                                                                               Sincerely,    Elizabeth Johnson MD  Medical Director, Kidney & Kidney/Pancreas Transplantation    tj/Enclosed    Cc: [facility]    Encl: UNOS Letter             The Organ Procurement and Transplantation Network   Toll-free patient services line: Your resource for organ transplant information     If you have a question regarding your own medical care, you always should call your transplant hospital first. However, for general organ transplant-related information, you can call the Organ Procurement and Transplantation Network (OPTN) toll-free patient services line at [phone].     Anyone, including potential transplant candidates, candidates, recipients, family members, friends, living donors, and donor family members, can call this number to:     · Talk about organ donation, living donation, the transplant process, the donation process, and transplant policies.   · Get a free patient information kit with helpful booklets, waiting list and transplant information, and a list of all transplant hospitals.   · Ask questions about the OPTN website (https://optn.transplant.hrsa.gov/), the United Network for Organ Sharings (UNOS) website (https://unos.org/), or the UNOS website for living donors and transplant recipients. (https://www.transplantliving.org/).   · Learn how the OPTN can help you.   · Talk about any concerns that you may have with a transplant hospital.     The nations transplant system, the OPTN, is managed under federal contract by the United Network for Organ Sharing (UNOS), which is a non-profit charitable organization. The OPTN helps create and define organ sharing policies that make the best use of donated organs. This process continuously evaluating new advances and discoveries so policies can be  adapted to best serve patients waiting for transplants. To do so, the OPTN works closely with transplant professionals, transplant patients, transplant candidates, donor families, living donors, and the public. All transplant programs and organ procurement organizations throughout the country are OPTN members and are obligated to follow the policies the OPTN creates for allocating organs.     The OPTN also is responsible for:   · Providing educational material for patients, the public, and professionals.   · Raising awareness of the need for donated organs and tissue.   · Coordinating organ procurement, matching, and placement.   · Collecting information about every organ transplant and donation that occurs in the [place].     Remember, you should contact your transplant hospital directly if you have questions or concerns about your own medical care including medical records, work-up progress, and test results.     We are not your transplant hospital, and our staff will not be able to answer questions about your case, so please keep your transplant hospitals phone number handy.   However, while you research your transplant needs and learn as much as you can about transplantation and donation, we welcome your call to our toll-free patient services line at [phone].

## 2020-02-03 ENCOUNTER — TELEPHONE (OUTPATIENT)
Dept: TRANSPLANT | Facility: CLINIC | Age: 55
End: 2020-02-03

## 2020-02-03 NOTE — TELEPHONE ENCOUNTER
Attempted to return patient's call. Voice mail is full and unable to leave message.        ----- Message from Merari Guevara sent at 2/3/2020 12:01 PM CST -----  Pt is calling to speak to coordinator      Pt contact [phone]

## 2020-02-04 ENCOUNTER — TELEPHONE (OUTPATIENT)
Dept: TRANSPLANT | Facility: CLINIC | Age: 55
End: 2020-02-04

## 2020-02-21 ENCOUNTER — PES CALL (OUTPATIENT)
Dept: ADMINISTRATIVE | Facility: CLINIC | Age: 55
End: 2020-02-21

## 2020-02-26 NOTE — TELEPHONE ENCOUNTER
----- Message from Madai Moreno sent at 1/10/2018 11:35 AM CST -----  Contact: Vicky with [facility]  Vicky is calling to give Paula an update from patient's emergency room visit.  Call Back#[phone]  Thanks   
Spoke to Vicky with [facility]. States patient was seen in ER on yesterday as suggested. Had acute CHF. Received diuretic in ER and has to increase her lasix to 40 mg daily x1 week then back to 20 mg.     Scheduled for ER f/u on the 18th with Ms estrella.  
DISCHARGE

## 2020-03-12 ENCOUNTER — PATIENT OUTREACH (OUTPATIENT)
Dept: ADMINISTRATIVE | Facility: HOSPITAL | Age: 55
End: 2020-03-12

## 2020-03-12 DIAGNOSIS — E11.9 DIABETES MELLITUS WITHOUT COMPLICATION: Primary | ICD-10-CM

## 2020-03-12 NOTE — PROGRESS NOTES
Chart review completed 03/12/2020.  Care Everywhere updates requested and reviewed.  Immunizations reconciled. Media reviewed.     Called pt., no answer, no opportunity to leave a VM. Portal message sent.    ha1c order placed.    Health Maintenance Due   Topic Date Due    TETANUS VACCINE  08/12/1983    Shingles Vaccine (1 of 2) 08/12/2015    Colonoscopy  10/05/2017    Hemoglobin A1c  01/03/2020

## 2020-03-23 DIAGNOSIS — F41.1 GAD (GENERALIZED ANXIETY DISORDER): ICD-10-CM

## 2020-03-23 DIAGNOSIS — E11.69 HYPERLIPIDEMIA ASSOCIATED WITH TYPE 2 DIABETES MELLITUS: ICD-10-CM

## 2020-03-23 DIAGNOSIS — E11.21 DIABETES MELLITUS WITH NEPHROPATHY: ICD-10-CM

## 2020-03-23 DIAGNOSIS — E78.00 HYPERCHOLESTEREMIA: ICD-10-CM

## 2020-03-23 DIAGNOSIS — E78.5 HYPERLIPIDEMIA ASSOCIATED WITH TYPE 2 DIABETES MELLITUS: ICD-10-CM

## 2020-03-23 NOTE — TELEPHONE ENCOUNTER
----- Message from Salvador Ayon sent at 3/23/2020 11:41 AM CDT -----  Contact: Patient  Type:  RX Refill Request    Who Called:  Patient  Refill or New Rx:  Refill  RX Name and Strength:    1. ezetimibe (ZETIA) 10 mg tablet  2. escitalopram oxalate (LEXAPRO) 5 MG Tab (patient states 20MG)  3. atorvastatin (LIPITOR) 80 MG tablet  4. fenofibrate 160 MG Tab  5. glipiZIDE (GLUCOTROL) 2.5 MG tablet (patient states they would like to discuss increasing the dosage)  6. Test strips (patient states round ones instead of flat ones, please call to confirm)  How is the patient currently taking it? (ex. 1XDay):  As ordered  Is this a 30 day or 90 day RX:  90/As ordered  Preferred Pharmacy with phone number:    [facility] - [place], [place] - [address]  [address] [facility] [address]  Phone: [phone] Fax: [phone]  Local or Mail Order:  Mail order  Ordering Provider:  Dr. Lopez  Best Call Back Number:  [phone]  Additional Information:  Please see above notes. Thanks!

## 2020-03-23 NOTE — TELEPHONE ENCOUNTER
Would not let me pend glipizide. Please advise.       LOV:11/14/2019  LAB:01/30/2020  Next OV:03/26/2020

## 2020-03-24 RX ORDER — FENOFIBRATE 160 MG/1
160 TABLET ORAL DAILY
Qty: 90 TABLET | Refills: 3 | Status: ON HOLD | OUTPATIENT
Start: 2020-03-24 | End: 2021-05-22 | Stop reason: HOSPADM

## 2020-03-24 RX ORDER — ESCITALOPRAM OXALATE 5 MG/1
5 TABLET ORAL DAILY
Qty: 90 TABLET | Refills: 3 | Status: SHIPPED | OUTPATIENT
Start: 2020-03-24 | End: 2020-10-23

## 2020-03-24 RX ORDER — EZETIMIBE 10 MG/1
10 TABLET ORAL DAILY
Qty: 90 TABLET | Refills: 3 | Status: SHIPPED | OUTPATIENT
Start: 2020-03-24 | End: 2021-05-28 | Stop reason: SDUPTHER

## 2020-03-24 RX ORDER — ATORVASTATIN CALCIUM 80 MG/1
80 TABLET, FILM COATED ORAL DAILY
Qty: 90 TABLET | Refills: 3 | Status: SHIPPED | OUTPATIENT
Start: 2020-03-24 | End: 2021-05-28 | Stop reason: SDUPTHER

## 2020-03-25 ENCOUNTER — TELEPHONE (OUTPATIENT)
Dept: FAMILY MEDICINE | Facility: CLINIC | Age: 55
End: 2020-03-25

## 2020-03-25 RX ORDER — GLIPIZIDE 2.5 MG/1
5 TABLET, EXTENDED RELEASE ORAL
Qty: 180 TABLET | Refills: 3 | Status: SHIPPED | OUTPATIENT
Start: 2020-03-25 | End: 2020-03-27

## 2020-03-25 NOTE — TELEPHONE ENCOUNTER
Attempted to return patient call mailbox is full.           ----- Message from Erika Tavares sent at 3/25/2020 12:30 PM CDT -----  Contact: patient  Type:  RX Refill Request    Who Called:  patient  Refill or New Rx:  refills  RX Name and Strength:  Needles for matrix machine, glipiZIDE (GLUCOTROL) 2.5 MG tablet,gabapentin (NEURONTIN) 300 MG capsule  How is the patient currently taking it? (ex. 1XDay):    Is this a 30 day or 90 day RX:    Preferred Pharmacy with phone number:  [facility],  [facility] or Mail Order:  Mail order  Ordering Provider:    Best Call Back Number:    Additional Information:  Stated need to increase the glipizide ER

## 2020-03-26 NOTE — TELEPHONE ENCOUNTER
----- Message from Princess LAYNE Lazo sent at 3/26/2020  4:08 PM CDT -----  Contact: pt  Type: Needs Medical Advice    Who Called:  Patient  Best Call Back Number:  Additional Information: Requesting a call back from Nurse regarding virtual visit.

## 2020-03-27 ENCOUNTER — TELEPHONE (OUTPATIENT)
Dept: FAMILY MEDICINE | Facility: CLINIC | Age: 55
End: 2020-03-27

## 2020-03-27 ENCOUNTER — DOCUMENTATION ONLY (OUTPATIENT)
Dept: FAMILY MEDICINE | Facility: CLINIC | Age: 55
End: 2020-03-27

## 2020-03-27 ENCOUNTER — OFFICE VISIT (OUTPATIENT)
Dept: FAMILY MEDICINE | Facility: CLINIC | Age: 55
End: 2020-03-27
Payer: MEDICARE

## 2020-03-27 DIAGNOSIS — I15.2 HYPERTENSION ASSOCIATED WITH DIABETES: Chronic | ICD-10-CM

## 2020-03-27 DIAGNOSIS — E11.59 HYPERTENSION ASSOCIATED WITH DIABETES: Chronic | ICD-10-CM

## 2020-03-27 DIAGNOSIS — E11.21 DIABETES MELLITUS WITH NEPHROPATHY: ICD-10-CM

## 2020-03-27 PROCEDURE — 99214 OFFICE O/P EST MOD 30 MIN: CPT | Mod: 95,,, | Performed by: FAMILY MEDICINE

## 2020-03-27 PROCEDURE — 2024F PR 7 FIELD PHOTOS WITH INTERP/ REVIEW: ICD-10-PCS | Mod: 95,,, | Performed by: FAMILY MEDICINE

## 2020-03-27 PROCEDURE — 99214 PR OFFICE/OUTPT VISIT, EST, LEVL IV, 30-39 MIN: ICD-10-PCS | Mod: 95,,, | Performed by: FAMILY MEDICINE

## 2020-03-27 PROCEDURE — 3052F HG A1C>EQUAL 8.0%<EQUAL 9.0%: CPT | Mod: CPTII,95,, | Performed by: FAMILY MEDICINE

## 2020-03-27 PROCEDURE — 3052F PR MOST RECENT HEMOGLOBIN A1C LEVEL 8.0 - < 9.0%: ICD-10-PCS | Mod: CPTII,95,, | Performed by: FAMILY MEDICINE

## 2020-03-27 PROCEDURE — 2024F 7 FLD RTA PHOTO EVC RTNOPTHY: CPT | Mod: 95,,, | Performed by: FAMILY MEDICINE

## 2020-03-27 RX ORDER — GABAPENTIN 300 MG/1
300 CAPSULE ORAL NIGHTLY
Qty: 90 CAPSULE | Refills: 3 | Status: SHIPPED | OUTPATIENT
Start: 2020-03-27 | End: 2022-03-10

## 2020-03-27 RX ORDER — GLIPIZIDE 5 MG/1
5 TABLET ORAL
Qty: 180 TABLET | Refills: 3 | Status: SHIPPED | OUTPATIENT
Start: 2020-03-27 | End: 2020-06-25

## 2020-03-27 NOTE — TELEPHONE ENCOUNTER
----- Message from Princess LAYNE Lazo sent at 3/26/2020  4:56 PM CDT -----  Contact: pt  Type: Needs Medical Advice    Who Called:  patient  Best Call Back Number: [phone]  Additional Information. Please call to assist the pt with the Virtual visit.

## 2020-03-27 NOTE — TELEPHONE ENCOUNTER
Called to try to help patient with virtual visit. patients mail box is full and was unable to leave a message.

## 2020-03-30 DIAGNOSIS — E11.9 TYPE 2 DIABETES MELLITUS WITHOUT COMPLICATION: ICD-10-CM

## 2020-04-07 ENCOUNTER — TELEPHONE (OUTPATIENT)
Dept: FAMILY MEDICINE | Facility: CLINIC | Age: 55
End: 2020-04-07

## 2020-04-07 NOTE — TELEPHONE ENCOUNTER
Spoke to pharmacy. Informed them that patient is taking the 5mg BID, and that the 2.5mg had been discontinued. Understanding verbalized.

## 2020-04-07 NOTE — TELEPHONE ENCOUNTER
----- Message from Tereza Eller sent at 4/7/2020  9:42 AM CDT -----  Contact: jose carlos  Type:  Pharmacy Calling to Clarify an RX    Name of Caller:  Jose Carlos  Pharmacy Name:  [facility]  Prescription Name:  glipiZIDE (GLUCOTROL) 5 MG tablet and glipizide 2.5 mg  What do they need to clarify?: what is the correct one or is it both  Best Call Back Number:  [phone]  Additional Information:  Please Advise ---Thank you

## 2020-04-15 NOTE — PROGRESS NOTES
The patient location is:  [place]  The chief complaint leading to consultation is: Follow-up    Visit type: Virtual visit with synchronous audio and video  Total time spent with patient:  15 minutes   Each patient to whom he or she provides medical services by telemedicine is:  (1) informed of the relationship between the physician and patient and the respective role of any other health care provider with respect to management of the patient; and (2) notified that he or she may decline to receive medical services by telemedicine and may withdraw from such care at any time.    Notes:  See below    Subjective:   Patient ID: Leanna García is a 54 y.o. female     Chief Complaint:Follow-up      Patient type 2 diabetes currently managed with oral medication.  Patient with neuropathy currently managed with gabapentin.  Patient hypertension managed with oral medication.    Review of Systems   Respiratory: Negative for shortness of breath.    Cardiovascular: Negative for chest pain.   Gastrointestinal: Negative for abdominal pain.   Genitourinary: Negative for dysuria.     Past Medical History:   Diagnosis Date    A-fib     resolved per patient    Arthritis     Bronchitis     Cardiovascular event risk, ASCVD 10-year risk 6.7% 10/15/2016    Diabetes mellitus     Diabetes mellitus type II     Encounter for blood transfusion     History of colon polyps 11/2/2016    Hyperlipidemia     Hypertension     Stroke      Objective:   There were no vitals filed for this visit.  There is no height or weight on file to calculate BMI.  Physical Exam   Constitutional: She is oriented to person, place, and time. She appears well-developed and well-nourished.   HENT:   Head: Normocephalic and atraumatic.   Eyes: Conjunctivae are normal.   Pulmonary/Chest: Effort normal. No respiratory distress.   Neurological: She is alert and oriented to person, place, and time.   Skin: No pallor.   Psychiatric: She has a normal mood and  affect. Her behavior is normal. Judgment and thought content normal.     Assessment:     1. Uncontrolled type 2 diabetes mellitus with stage 3 chronic kidney disease, without long-term current use of insulin    2. Diabetes mellitus with nephropathy    3. Hypertension associated with diabetes      Plan:   Uncontrolled type 2 diabetes mellitus with stage 3 chronic kidney disease, without long-term current use of insulin  -     glipiZIDE (GLUCOTROL) 5 MG tablet; Take 1 tablet (5 mg total) by mouth 2 (two) times daily before meals.  Dispense: 180 tablet; Refill: 3    Diabetes mellitus with nephropathy  -     gabapentin (NEURONTIN) 300 MG capsule; Take 1 capsule (300 mg total) by mouth every evening.  Dispense: 90 capsule; Refill: 3    Hypertension associated with diabetes  Stable on oral medication.          Time spent with patient: 15 minutes and over half of that time was spent on counseling an coordination of care.    Stefano Lopez MD  04/15/2020    Portions of this note have been dictated with BRIAN Perdomo

## 2020-04-21 NOTE — HPI
Leanna García is a 51 y/o female with uncontrolled DM type 2 with CKD stage 4, HLD, and HTN, past history atrial fibrillation, presented to the ED with sudden onset L flank pain, described as sharp and constant, which awakened her from sleep early this morning.  She did experience nausea and had one episode of vomiting, but denies fever, chest pain, palpitations, SOB, weakness, diarrhea or constipation.  Denies trauma; denies new or unusual rash.  Pain was unrelieved by tylenol.  States that her BP was high, so she took her home meds this morning prior to arrival to the emergency department.  Currently, blood pressure remains elevated; however, she is asymptomatic.  She was given morphine IV in the ED and states pain level is currently 3/10; nausea has subsided. Patient had Ct scan concerning for pyelonephritis and placed in hospital for further evaluation and treatment.   Patient/Caregiver provided printed discharge information.

## 2020-05-05 ENCOUNTER — PATIENT MESSAGE (OUTPATIENT)
Dept: ADMINISTRATIVE | Facility: HOSPITAL | Age: 55
End: 2020-05-05

## 2020-06-05 ENCOUNTER — PATIENT OUTREACH (OUTPATIENT)
Dept: ADMINISTRATIVE | Facility: HOSPITAL | Age: 55
End: 2020-06-05

## 2020-06-05 NOTE — PROGRESS NOTES
Chart review completed 06/05/2020.  Care Everywhere updates requested and reviewed.  Immunizations reconciled. Media reviewed.     DIS, [facility], and [employer] reviewed    PORTAL MESSAGE SENT

## 2020-06-05 NOTE — LETTER
June 12, 2020    Leanna García  [address]  [place] [address]             [facility]  [address]  [place] [address]  Phone: [phone] [facility] is committed to your overall health.  To help you get the most out of each of your visits, we will review your information to make sure you are up to date on all of your recommended tests and/or procedures.       Dr. Stefano Lopez MD has found that your chart shows you may be due for a:     HEMOGLOBIN A1C (DIABETES LAB)   COLORECTAL CANCER SCREENING     If you have had any of the above done at another facility, please bring the records or information with you so that your record at [facility] will be complete.  If you would like to schedule any of these, please contact the clinic at [phone].     If you are currently taking medication, please bring it with you to your appointment for review.     Also, if you have any type of Advanced Directives, please bring them with you to your office visit so we may scan them into your chart.     Thank You,     Your [facility] Team,   MD Madhavi Means LPN Clinical Care Coordinator   [facility]   [address] [address]   Phone [phone]   Fax [phone]

## 2020-06-12 ENCOUNTER — TELEPHONE (OUTPATIENT)
Dept: FAMILY MEDICINE | Facility: CLINIC | Age: 55
End: 2020-06-12

## 2020-06-22 ENCOUNTER — PATIENT OUTREACH (OUTPATIENT)
Dept: ADMINISTRATIVE | Facility: HOSPITAL | Age: 55
End: 2020-06-22

## 2020-06-22 NOTE — PROGRESS NOTES
.Chart review completed 06/22/2020.  Care Everywhere updates requested and reviewed.  Immunizations reconciled. Media reviewed.     [employer] reviewed     Portal Message Sent.

## 2020-06-23 ENCOUNTER — PATIENT OUTREACH (OUTPATIENT)
Dept: ADMINISTRATIVE | Facility: OTHER | Age: 55
End: 2020-06-23

## 2020-06-25 ENCOUNTER — OFFICE VISIT (OUTPATIENT)
Dept: DIABETES | Facility: CLINIC | Age: 55
End: 2020-06-25
Payer: MEDICARE

## 2020-06-25 ENCOUNTER — TELEPHONE (OUTPATIENT)
Dept: DIABETES | Facility: CLINIC | Age: 55
End: 2020-06-25

## 2020-06-25 VITALS — WEIGHT: 217 LBS | BODY MASS INDEX: 33.39 KG/M2

## 2020-06-25 DIAGNOSIS — Z59.9 FINANCIAL DIFFICULTIES: ICD-10-CM

## 2020-06-25 DIAGNOSIS — E11.42 TYPE 2 DIABETES MELLITUS WITH DIABETIC POLYNEUROPATHY, WITHOUT LONG-TERM CURRENT USE OF INSULIN: ICD-10-CM

## 2020-06-25 DIAGNOSIS — E66.9 OBESITY (BMI 30.0-34.9): ICD-10-CM

## 2020-06-25 DIAGNOSIS — E11.65 TYPE 2 DIABETES MELLITUS WITH HYPERGLYCEMIA, WITHOUT LONG-TERM CURRENT USE OF INSULIN: ICD-10-CM

## 2020-06-25 DIAGNOSIS — Z99.2 STAGE 5 CHRONIC KIDNEY DISEASE ON CHRONIC DIALYSIS: ICD-10-CM

## 2020-06-25 DIAGNOSIS — Z86.73 HISTORY OF TIA (TRANSIENT ISCHEMIC ATTACK): ICD-10-CM

## 2020-06-25 DIAGNOSIS — Z71.9 HEALTH EDUCATION/COUNSELING: ICD-10-CM

## 2020-06-25 DIAGNOSIS — N18.6 STAGE 5 CHRONIC KIDNEY DISEASE ON CHRONIC DIALYSIS: ICD-10-CM

## 2020-06-25 DIAGNOSIS — E11.21 DIABETES MELLITUS WITH NEPHROPATHY: ICD-10-CM

## 2020-06-25 PROCEDURE — 3052F HG A1C>EQUAL 8.0%<EQUAL 9.0%: CPT | Mod: CPTII,95,, | Performed by: NURSE PRACTITIONER

## 2020-06-25 PROCEDURE — 2024F PR 7 FIELD PHOTOS WITH INTERP/ REVIEW: ICD-10-PCS | Mod: 95,,, | Performed by: NURSE PRACTITIONER

## 2020-06-25 PROCEDURE — 3008F PR BODY MASS INDEX (BMI) DOCUMENTED: ICD-10-PCS | Mod: CPTII,95,, | Performed by: NURSE PRACTITIONER

## 2020-06-25 PROCEDURE — 3008F BODY MASS INDEX DOCD: CPT | Mod: CPTII,95,, | Performed by: NURSE PRACTITIONER

## 2020-06-25 PROCEDURE — 3052F PR MOST RECENT HEMOGLOBIN A1C LEVEL 8.0 - < 9.0%: ICD-10-PCS | Mod: CPTII,95,, | Performed by: NURSE PRACTITIONER

## 2020-06-25 PROCEDURE — 99215 PR OFFICE/OUTPT VISIT, EST, LEVL V, 40-54 MIN: ICD-10-PCS | Mod: 95,,, | Performed by: NURSE PRACTITIONER

## 2020-06-25 PROCEDURE — 2024F 7 FLD RTA PHOTO EVC RTNOPTHY: CPT | Mod: 95,,, | Performed by: NURSE PRACTITIONER

## 2020-06-25 PROCEDURE — 99215 OFFICE O/P EST HI 40 MIN: CPT | Mod: 95,,, | Performed by: NURSE PRACTITIONER

## 2020-06-25 RX ORDER — INSULIN LISPRO 100 [IU]/ML
INJECTION, SOLUTION INTRAVENOUS; SUBCUTANEOUS
Qty: 2 BOX | Refills: 2 | Status: SHIPPED | OUTPATIENT
Start: 2020-06-25 | End: 2020-06-25

## 2020-06-25 RX ORDER — INSULIN LISPRO 100 [IU]/ML
INJECTION, SOLUTION INTRAVENOUS; SUBCUTANEOUS
Qty: 2 BOX | Refills: 2 | Status: SHIPPED | OUTPATIENT
Start: 2020-06-25 | End: 2020-06-25 | Stop reason: SDUPTHER

## 2020-06-25 RX ORDER — INSULIN LISPRO 100 [IU]/ML
INJECTION, SOLUTION INTRAVENOUS; SUBCUTANEOUS
Qty: 3 BOX | Refills: 2 | Status: SHIPPED | OUTPATIENT
Start: 2020-06-25 | End: 2020-06-25

## 2020-06-25 RX ORDER — PEN NEEDLE, DIABETIC 30 GX3/16"
NEEDLE, DISPOSABLE MISCELLANEOUS
Qty: 450 EACH | Refills: 2 | Status: SHIPPED | OUTPATIENT
Start: 2020-06-25 | End: 2020-10-26 | Stop reason: SDUPTHER

## 2020-06-25 RX ORDER — INSULIN LISPRO 100 [IU]/ML
10 INJECTION, SUSPENSION SUBCUTANEOUS
Qty: 3 BOX | Refills: 2 | Status: SHIPPED | OUTPATIENT
Start: 2020-06-25 | End: 2020-06-30

## 2020-06-25 RX ORDER — LANCETS
EACH MISCELLANEOUS
Qty: 450 EACH | Refills: 3 | Status: SHIPPED | OUTPATIENT
Start: 2020-06-25 | End: 2020-07-07 | Stop reason: SDUPTHER

## 2020-06-25 SDOH — SOCIAL DETERMINANTS OF HEALTH (SDOH): PROBLEM RELATED TO HOUSING AND ECONOMIC CIRCUMSTANCES, UNSPECIFIED: Z59.9

## 2020-06-25 NOTE — ASSESSMENT & PLAN NOTE
Uncontrolled   BG readings on labs from dialysis center are markedly above goal   + dietary indiscretions.   + financial difficulties are going to hinder overall diabetes management.  We had a long discussion that I think she would benefit from the VGo -- however she is very concerned with the cost and does not want to proceed at this time.  She reports that she would like to be on 1 injection that would be able to cover everything.  Discussed with patient that GLP1 would be a good option however the cost is very high when she goes into the donut hole and she does not think she will be able to afford it.  Based on her labs from dialysis.  She needs both basal and prandial insulin.-- she reports that she cannot afford to separate insulin injections- such as Lantus and NovoLog  Discuss mixed insulin--although not ideal with her renal function--would be more cost effective for her.  Will get professional CGM to evaluate insulin needs    On top of financial difficulties her treatment options are limited due to her CKD stage 5 on dialysis      -- Medication Changes:  Stop glipizide  Start Humalog 50/50 (she needs more prandial coverage) - weight based at 0.3 units/kg per day total daily dose  Inject 10 units 3 times per day before meals    She is familiar with insulin injections as she was on insulin in the past.  Will still have her meet with diabetes education to review insulin injections    -- Reviewed goals of therapy are to get the best control we can without hypoglycemia.  -- Reviewed patient's current insulin regimen. Clarified proper insulin dose and timing in relation to meals, etc. Insulin injection sites and proper rotation instructed.    -- Advised frequent self blood glucose monitoring.  Patient encouraged to document glucose results and bring them to every clinic visit.  At least 3 times per day--would like a.c. HS--will mail logs to patient  -- Hypoglycemia precautions discussed. Instructed on precautions  before driving.    -- Call for Bg repeatedly < 90 or > 180.   -- Close adherence to lifestyle changes recommended.   -- Periodic follow ups for eye evaluations, foot care and dental care suggested.  -- Refer to diabetes education-- insulin teaching and diet-comprehensive review--professional CGM

## 2020-06-25 NOTE — Clinical Note
Hey!   New patient! She is kind of difficult due to CKD stage 5 and financial difficulties.  Her A1c in April was up to 8.9%.  The lab she faxed over to me shows that her blood sugars were in the 500s the other day in dialysis  The V Go would be perfect for her but she is concerned about cost.  I discussed basal and prandial insulin.  She is concerned about the cost of 2 different insulins.--so against my better judgment we are doing Humalog 50 50 3 times a day with meals--she has done insulin injections in the past so she is familiar.  I would like to get a professional CGM on her please! Thank you

## 2020-06-25 NOTE — ASSESSMENT & PLAN NOTE
Avoid insulin stacking and hypoglycemia  On dialysis Monday Wednesday Friday  Continue to follow with Nephrology  Not currently a candidate for kidney transplant

## 2020-06-25 NOTE — TELEPHONE ENCOUNTER
----- Message from Yani Sweeney NP sent at 6/25/2020  4:35 PM CDT -----  Follow up visit in 4 weeks please! Thank you

## 2020-06-25 NOTE — PROGRESS NOTES
The patient location is: [place] at home   The chief complaint leading to consultation is: Diabetes management- new consult     Visit type: audiovisual    Face to Face time with patient: 60  65 minutes of total time spent on the encounter, which includes face to face time and non-face to face time preparing to see the patient (eg, review of tests), Obtaining and/or reviewing separately obtained history, Documenting clinical information in the electronic or other health record, Independently interpreting results (not separately reported) and communicating results to the patient/family/caregiver, or Care coordination (not separately reported).         Each patient to whom he or she provides medical services by telemedicine is:  (1) informed of the relationship between the physician and patient and the respective role of any other health care provider with respect to management of the patient; and (2) notified that he or she may decline to receive medical services by telemedicine and may withdraw from such care at any time.    Notes:       CC:   Chief Complaint   Patient presents with    Diabetes Mellitus     virtual visit- new consult        HPI: Leanna García is a 54 y.o. female presents for an initial visit today for the management of T2DM.     She was diagnosed with Type 2  diabetes > 20 years old on routine lab work. She was initially started on Metformin. She was on insulin therapy years ago. She reports insulin was stopped and switched to Glipizide.   She has CKD stage 5 on  HD- MWF-not a candidate for kidney transplant at this time due to lack of social support.  She has met with the transplant team    External labs show an A1c is 8.9% in April 2020--- however more recent lab work shows that her blood sugars have been in the 300s to 500s.   See external labs attached to visit    Family hx of diabetes: 2 sisters, father- prediabetes , mother, and maternal grandmother   Hospitalized for diabetes: years ago  due to hypoglycemia     No personal or FH of thyroid cancer or personal of pancreatic cancer or pancreatitis.     DIABETES COMPLICATIONS: nephropathy, peripheral neuropathy and cerebrovascular disease    History of mini stroke in 2017  CKD stage 5 on dialysis    Diabetes Management Status    ASA:  Yes - 81 mg     Statin: Taking  ACE/ARB: Not taking    Screening or Prevention Patient's value Goal Complete/Controlled?   HgA1C Testing and Control   Lab Results   Component Value Date    HGBA1C 8.2 (H) 10/03/2019      Annually/Less than 8% No   Lipid profile : 01/30/2020 Annually Yes   LDL control Lab Results   Component Value Date    LDLCALC 214.6 (H) 01/30/2020    Annually/Less than 100 mg/dl  No   Nephropathy screening Lab Results   Component Value Date    LABMICR >5000.0 10/12/2017     Lab Results   Component Value Date    PROTEINUA 3+ (A) 08/28/2018    Annually No   Blood pressure BP Readings from Last 1 Encounters:   01/30/20 129/80    Less than 140/90 Yes   Dilated retinal exam : 09/12/2019 Annually Yes   Foot exam   : 11/14/2019 Annually Yes       CURRENT A1C:    Hemoglobin A1C   Date Value Ref Range Status   10/03/2019 8.2 (H) 4.0 - 5.6 % Final     Comment:     ADA Screening Guidelines:  5.7-6.4%  Consistent with prediabetes  >or=6.5%  Consistent with diabetes  High levels of fetal hemoglobin interfere with the HbA1C  assay. Heterozygous hemoglobin variants (HbS, HgC, etc)do  not significantly interfere with this assay.   However, presence of multiple variants may affect accuracy.     01/16/2019 6.4 % Final   10/23/2018 7.0 (H) 4.0 - 5.6 % Final     Comment:     ADA Screening Guidelines:  5.7-6.4%  Consistent with prediabetes  >or=6.5%  Consistent with diabetes  High levels of fetal hemoglobin interfere with the HbA1C  assay. Heterozygous hemoglobin variants (HbS, HgC, etc)do  not significantly interfere with this assay.   However, presence of multiple variants may affect accuracy.     04/24/2018 7.5 (H) 4.0 -  5.6 % Final     Comment:     According to ADA guidelines, hemoglobin A1c <7.0% represents  optimal control in non-pregnant diabetic patients. Different  metrics may apply to specific patient populations.   Standards of Medical Care in Diabetes-2016.  For the purpose of screening for the presence of diabetes:  <5.7%     Consistent with the absence of diabetes  5.7-6.4%  Consistent with increasing risk for diabetes   (prediabetes)  >or=6.5%  Consistent with diabetes  Currently, no consensus exists for use of hemoglobin A1c  for diagnosis of diabetes for children.  This Hemoglobin A1c assay has significant interference with fetal   hemoglobin   (HbF). The results are invalid for patients with abnormal amounts of   HbF,   including those with known Hereditary Persistence   of Fetal Hemoglobin. Heterozygous hemoglobin variants (HbAS, HbAC,   HbAD, HbAE, HbA2) do not significantly interfere with this assay;   however, presence of multiple variants in a sample may impact the %   interference.         GOAL A1C: 7.5% without hypoglycemia     DM MEDICATIONS USED IN THE PAST: Glipizide XR    Metformin-- stopped due to CKD   Insulin in the past -- Lantus       CURRENT DIABETES MEDICATIONS: Glipizide XR 5 mg BID   Insulin: N/A   Missed doses: denies       BLOOD GLUCOSE MONITORING:  She is checking her BG 2-3 times per day   Per oral recall:   FB, 123, 133, 114, 116 -- reportedly   Before bed: 259, 277, 200's   Once or twice she has seen her BG readings in the 500's   Right now her BG is :   503- the other day in dialysis on labs   305- in May at dialysis   275-- in April at Dialysis   BG is 376 on the phone today ---she ate 11 AM- 1/2 grilled pork chop sandwich   BG this AM was 155 reportedly       HYPOGLYCEMIA:  No      MEALS: eating 3 meals per day   BF: eggs, or oatmeal, or cereal or grits - she had eggs and toast this AM with a glass of 2 % milk   Lunch: grilled pork chop sandwich or another sandwich   Dinner:  vegetables, fish, mash potatoes, corn   Snack: apples with peanut butter   Drinks: 2% milk with breakfast, SF tea, water   No soft drinks        CURRENT EXERCISE:  No      Review of Systems  Review of Systems   Constitutional: Negative for appetite change and fatigue.   HENT: Negative for trouble swallowing.    Eyes: Negative for visual disturbance.   Respiratory: Negative for shortness of breath.    Cardiovascular: Negative for chest pain.   Gastrointestinal: Negative for nausea.   Endocrine: Negative for polydipsia, polyphagia and polyuria.   Genitourinary:        No Nocturia    Musculoskeletal: Positive for back pain.   Skin: Negative for wound.   Neurological: Positive for numbness.       Physical Exam   Physical Exam  Constitutional:       Appearance: Normal appearance.   HENT:      Head: Normocephalic.   Pulmonary:      Effort: Pulmonary effort is normal.   Neurological:      Mental Status: She is alert and oriented to person, place, and time.   Psychiatric:         Mood and Affect: Mood normal.         Behavior: Behavior normal.         Thought Content: Thought content normal.         Judgment: Judgment normal.         FOOT EXAMINATION:       Lab Results   Component Value Date    TSH 3.238 10/14/2016         Type 2 diabetes mellitus with hyperglycemia, without long-term current use of insulin  Uncontrolled   BG readings on labs from dialysis center are markedly above goal   + dietary indiscretions.   + financial difficulties are going to hinder overall diabetes management.  We had a long discussion that I think she would benefit from the VGo -- however she is very concerned with the cost and does not want to proceed at this time.  She reports that she would like to be on 1 injection that would be able to cover everything.  Discussed with patient that GLP1 would be a good option however the cost is very high when she goes into the donut hole and she does not think she will be able to afford it.  Based on her  labs from dialysis.  She needs both basal and prandial insulin.-- she reports that she cannot afford to separate insulin injections- such as Lantus and NovoLog  Discuss mixed insulin--although not ideal with her renal function--would be more cost effective for her.  Will get professional CGM to evaluate insulin needs    On top of financial difficulties her treatment options are limited due to her CKD stage 5 on dialysis      -- Medication Changes:  Stop glipizide  Start Humalog 50/50 (she needs more prandial coverage) - weight based at 0.3 units/kg per day total daily dose  Inject 10 units 3 times per day before meals    She is familiar with insulin injections as she was on insulin in the past.  Will still have her meet with diabetes education to review insulin injections    -- Reviewed goals of therapy are to get the best control we can without hypoglycemia.  -- Reviewed patient's current insulin regimen. Clarified proper insulin dose and timing in relation to meals, etc. Insulin injection sites and proper rotation instructed.    -- Advised frequent self blood glucose monitoring.  Patient encouraged to document glucose results and bring them to every clinic visit.  At least 3 times per day--would like a.c. HS--will mail logs to patient  -- Hypoglycemia precautions discussed. Instructed on precautions before driving.    -- Call for Bg repeatedly < 90 or > 180.   -- Close adherence to lifestyle changes recommended.   -- Periodic follow ups for eye evaluations, foot care and dental care suggested.  -- Refer to diabetes education-- insulin teaching and diet-comprehensive review--professional CGM          Type 2 diabetes mellitus with diabetic polyneuropathy, without long-term current use of insulin  Optimize BG readings.   See above.   On gabapentin    Educated patient to check feet daily for any foreign objects and/or wounds. Discussed with patient the importance of wearing appropriate footwear at all times, not to  walk barefoot ever, and to check shoes before putting them on feet. Instructed patient to keep feet dry by regularly changing shoes and socks and drying feet after baths and exercises. Also, instructed patient to report any new lesions, discolorations, or swelling to a healthcare professional.        Stage 5 chronic kidney disease on chronic dialysis  Avoid insulin stacking and hypoglycemia  On dialysis Monday Wednesday Friday  Continue to follow with Nephrology  Not currently a candidate for kidney transplant    Obesity (BMI 30.0-34.9)  Body mass index is 33.39 kg/m².  Increases insulin resistance.   Discussed DM diet and exercise.       History of TIA (transient ischemic attack)  Optimize blood sugar readings      Spent 60 minutes with patient with >50% time spent in counseling on treatment options.        Follow up in about 4 weeks (around 7/23/2020).   BG log review and professional CGM       Orders Placed This Encounter   Procedures    Ambulatory referral/consult to Diabetes Education     Standing Status:   Future     Standing Expiration Date:   6/25/2021     Referral Priority:   Routine     Referral Type:   Consultation     Referral Reason:   Specialty Services Required     Requested Specialty:   Diabetes     Number of Visits Requested:   1    GLUCOSE MONITORING CONTINUOUS MIN 72 HOURS     Standing Status:   Future     Standing Expiration Date:   6/25/2021       Recommendations were discussed with the patient in detail  The patient verbalized understanding and agrees with the plan outlined as above.

## 2020-06-25 NOTE — ASSESSMENT & PLAN NOTE
Body mass index is 33.39 kg/m².  Increases insulin resistance.   Discussed DM diet and exercise.

## 2020-06-26 ENCOUNTER — TELEPHONE (OUTPATIENT)
Dept: FAMILY MEDICINE | Facility: CLINIC | Age: 55
End: 2020-06-26

## 2020-06-26 DIAGNOSIS — E11.9 TYPE 2 DIABETES MELLITUS WITHOUT COMPLICATION: ICD-10-CM

## 2020-06-26 NOTE — TELEPHONE ENCOUNTER
----- Message from Maryanne Jones sent at 6/26/2020 11:47 AM CDT -----  Regarding: vv for today  Contact: Leanna  Type:  Same Day Appointment Request    Caller is requesting a same day appointment.  Caller declined first available appointment listed below.      Name of Caller:  Leanna  When is the first available appointment?  She had one earlier but could not get it to load  Symptoms:  f/u  Best Call Back Number:  [phone]  Additional Information:   Pt could not get cortney to work right this morning. She is requesting to see if she can re do it today

## 2020-06-30 ENCOUNTER — TELEPHONE (OUTPATIENT)
Dept: DIABETES | Facility: CLINIC | Age: 55
End: 2020-06-30

## 2020-06-30 RX ORDER — INSULIN ASPART 100 [IU]/ML
10 INJECTION, SUSPENSION SUBCUTANEOUS
Qty: 15 SYRINGE | Refills: 2 | Status: SHIPPED | OUTPATIENT
Start: 2020-06-30 | End: 2020-07-09 | Stop reason: SDUPTHER

## 2020-06-30 RX ORDER — INSULIN LISPRO 100 [IU]/ML
INJECTION, SUSPENSION SUBCUTANEOUS
Qty: 45 ML | Refills: 2 | Status: SHIPPED | OUTPATIENT
Start: 2020-06-30 | End: 2020-06-30

## 2020-06-30 NOTE — TELEPHONE ENCOUNTER
----- Message from Wolf Knox sent at 6/30/2020 11:48 AM CDT -----  Pt is requesting a call back in regards to a medication that was prescribed by you insulin lispro protamin-lispro (HUMALOG MIX 50-50 KWIKPEN) 100 unit/mL (50-50) InPn. Pt just needs to know if she can be approved for the NOVALOG, that's the only one that her insurance will cover. Please call back to further discuss.       Contact Info [phone] (home)

## 2020-06-30 NOTE — TELEPHONE ENCOUNTER
Returning patient's call.   The only mixed insulin that is Novolog is 70/30 not 50/50 like the Humalog   I wanted the 50/50 as she needed more prandial insulin coverage due to diet.   Due to insurance preference will change to Novolog 70/30 and keep 10 unitsTID AC    Left voicemail for patient to call me back

## 2020-07-01 ENCOUNTER — TELEPHONE (OUTPATIENT)
Dept: DIABETES | Facility: CLINIC | Age: 55
End: 2020-07-01

## 2020-07-01 NOTE — TELEPHONE ENCOUNTER
----- Message from Jessica Christopher MA sent at 7/1/2020  1:34 PM CDT -----  Regarding: Insulin  Pt has a question about her Novolog directions

## 2020-07-01 NOTE — TELEPHONE ENCOUNTER
Attempted to call patient again this morning to discuss her insulin.  Left a voicemail for patient to call me back

## 2020-07-01 NOTE — TELEPHONE ENCOUNTER
Returning patients call regarding questions for her insulin.   All questions answered.   Set up follow up appointment with me and ansond shola to check on education appointment and appointment for professional CGM.

## 2020-07-02 ENCOUNTER — TELEPHONE (OUTPATIENT)
Dept: DIABETES | Facility: CLINIC | Age: 55
End: 2020-07-02

## 2020-07-02 NOTE — TELEPHONE ENCOUNTER
Spoke with patient, she needs to work out transportation before she can commit to a date and time, patient will call back once she secures transportation.

## 2020-07-02 NOTE — TELEPHONE ENCOUNTER
----- Message from Yani Sweeney NP sent at 7/1/2020  2:36 PM CDT -----  Hey!     She needs to see you and get a professional CGM  She is starting insulin and has CKD stage 5   I need to see how much insulin she needs.     Thanks, Yani

## 2020-07-06 RX ORDER — DEXTROSE 4 G
TABLET,CHEWABLE ORAL
Qty: 1 EACH | Refills: 0 | Status: SHIPPED | OUTPATIENT
Start: 2020-07-06 | End: 2022-05-04

## 2020-07-07 ENCOUNTER — TELEPHONE (OUTPATIENT)
Dept: DIABETES | Facility: CLINIC | Age: 55
End: 2020-07-07

## 2020-07-07 DIAGNOSIS — E11.65 TYPE 2 DIABETES MELLITUS WITH HYPERGLYCEMIA, WITHOUT LONG-TERM CURRENT USE OF INSULIN: ICD-10-CM

## 2020-07-07 RX ORDER — LANCETS
EACH MISCELLANEOUS
Qty: 450 EACH | Refills: 3 | Status: SHIPPED | OUTPATIENT
Start: 2020-07-07 | End: 2021-01-19 | Stop reason: SDUPTHER

## 2020-07-07 NOTE — TELEPHONE ENCOUNTER
----- Message from Jessica Christopher MA sent at 7/7/2020  1:27 PM CDT -----  Contact: henny   [phone]    ----- Message -----  From: Leilani aRm  Sent: 7/7/2020  10:38 AM CDT  To: [facility] Staff    Caller says  she has an issue with the needles that go to the machine.    Found the new Machine.   Will start using the new Machine:   Reli On Trumetrix Air.   Does not have enough supplies.   Strips:  T[employer]rix  testing 3-4 times a day  / Needles :  Adam Plus.  Cannot use the flat needles, must use the round ones.33 gauge needles.  Pls order this for her.   Using [facility]. : [phone] /    Member: [id number].     /  Also having a problem with the Novalog.  The kidney dr. Keyon Arzate  at her Dialysis unit  Told pt. sHe should not take  the Novalog 3 times a day.   Pt. Was told she cannot eat while having dialysis.   Asking if you will pls speak to her Dialysis dr. About the Novalog.

## 2020-07-08 ENCOUNTER — PATIENT OUTREACH (OUTPATIENT)
Dept: ADMINISTRATIVE | Facility: OTHER | Age: 55
End: 2020-07-08

## 2020-07-09 ENCOUNTER — OFFICE VISIT (OUTPATIENT)
Dept: DIABETES | Facility: CLINIC | Age: 55
End: 2020-07-09
Payer: MEDICARE

## 2020-07-09 ENCOUNTER — PATIENT OUTREACH (OUTPATIENT)
Dept: ADMINISTRATIVE | Facility: HOSPITAL | Age: 55
End: 2020-07-09

## 2020-07-09 DIAGNOSIS — Z71.9 HEALTH EDUCATION/COUNSELING: ICD-10-CM

## 2020-07-09 DIAGNOSIS — N18.6 STAGE 5 CHRONIC KIDNEY DISEASE ON CHRONIC DIALYSIS: ICD-10-CM

## 2020-07-09 DIAGNOSIS — E11.42 TYPE 2 DIABETES MELLITUS WITH DIABETIC POLYNEUROPATHY, WITHOUT LONG-TERM CURRENT USE OF INSULIN: ICD-10-CM

## 2020-07-09 DIAGNOSIS — Z59.9 FINANCIAL DIFFICULTIES: ICD-10-CM

## 2020-07-09 DIAGNOSIS — E11.65 TYPE 2 DIABETES MELLITUS WITH HYPERGLYCEMIA, WITHOUT LONG-TERM CURRENT USE OF INSULIN: Primary | ICD-10-CM

## 2020-07-09 DIAGNOSIS — Z99.2 STAGE 5 CHRONIC KIDNEY DISEASE ON CHRONIC DIALYSIS: ICD-10-CM

## 2020-07-09 DIAGNOSIS — Z86.73 HISTORY OF TIA (TRANSIENT ISCHEMIC ATTACK): ICD-10-CM

## 2020-07-09 PROCEDURE — 99214 PR OFFICE/OUTPT VISIT, EST, LEVL IV, 30-39 MIN: ICD-10-PCS | Mod: 95,,, | Performed by: NURSE PRACTITIONER

## 2020-07-09 PROCEDURE — 3052F HG A1C>EQUAL 8.0%<EQUAL 9.0%: CPT | Mod: CPTII,95,, | Performed by: NURSE PRACTITIONER

## 2020-07-09 PROCEDURE — 2024F 7 FLD RTA PHOTO EVC RTNOPTHY: CPT | Mod: 95,,, | Performed by: NURSE PRACTITIONER

## 2020-07-09 PROCEDURE — 99214 OFFICE O/P EST MOD 30 MIN: CPT | Mod: 95,,, | Performed by: NURSE PRACTITIONER

## 2020-07-09 PROCEDURE — 3052F PR MOST RECENT HEMOGLOBIN A1C LEVEL 8.0 - < 9.0%: ICD-10-PCS | Mod: CPTII,95,, | Performed by: NURSE PRACTITIONER

## 2020-07-09 PROCEDURE — 2024F PR 7 FIELD PHOTOS WITH INTERP/ REVIEW: ICD-10-PCS | Mod: 95,,, | Performed by: NURSE PRACTITIONER

## 2020-07-09 RX ORDER — INSULIN ASPART 100 [IU]/ML
10 INJECTION, SUSPENSION SUBCUTANEOUS
Qty: 15 SYRINGE | Refills: 2 | Status: SHIPPED | OUTPATIENT
Start: 2020-07-09 | End: 2020-08-13

## 2020-07-09 SDOH — SOCIAL DETERMINANTS OF HEALTH (SDOH): PROBLEM RELATED TO HOUSING AND ECONOMIC CIRCUMSTANCES, UNSPECIFIED: Z59.9

## 2020-07-09 NOTE — PROGRESS NOTES
The patient location is: Home   The chief complaint leading to consultation is: Diabetes Management - follow up     Visit type: audiovisual    Face to Face time with patient: 25 minutes   30  minutes of total time spent on the encounter, which includes face to face time and non-face to face time preparing to see the patient (eg, review of tests), Obtaining and/or reviewing separately obtained history, Documenting clinical information in the electronic or other health record, Independently interpreting results (not separately reported) and communicating results to the patient/family/caregiver, or Care coordination (not separately reported).         Each patient to whom he or she provides medical services by telemedicine is:  (1) informed of the relationship between the physician and patient and the respective role of any other health care provider with respect to management of the patient; and (2) notified that he or she may decline to receive medical services by telemedicine and may withdraw from such care at any time.    Notes:       CC:   Chief Complaint   Patient presents with    Diabetes Mellitus     virtual follow up        HPI: Leanna García is a 54 y.o. female presents for a follow up visit today for the management of T2DM.     She was diagnosed with Type 2  diabetes > 20 years old on routine lab work. She was initially started on Metformin. She was on insulin therapy years ago. She reports insulin was stopped and switched to Glipizide.   She has CKD stage 5 on  HD- MWF-not a candidate for kidney transplant at this time due to lack of social support.  She has met with the transplant team    External labs show an A1c is 8.9% in April 2020.  She reports she is getting another A1c done next week.    Since our virtual visit a couple weeks ago she reports she has altered her diet significantly.  She stopped the glipizide and started mixed insulin therapy 2 days ago.  She was not open to VGo therapy and was  concerned with cost for MDI with Levemir and NovoLog or Lantus and Humalog.  She is having a hard time with transportation at this time.  She did not follow up with diabetes education nor did she get the professional CGM.  She reports she cannot set up a visit for profession CGM at this time due to transportation issues.  She reports that her nephrologist instructed her to only take the NovoLog 70 30 twice a day instead of 3 times a day as her blood sugar readings had markedly improved with dietary changes---she was previously running in the 300-500 range and is now running in the 100s to 160s.  Since starting the mixed insulin no episodes of hypoglycemia.  She feels comfortable with injections.  She is still waiting on her new meter and lancet device from [employer] that was sent in earlier in the week.  She is unable to come to the hospital to get lab work done for me at this time      Family hx of diabetes: 2 sisters, father- prediabetes , mother, and maternal grandmother   Hospitalized for diabetes: years ago due to hypoglycemia     No personal or FH of thyroid cancer or personal of pancreatic cancer or pancreatitis.     DIABETES COMPLICATIONS: nephropathy, peripheral neuropathy and cerebrovascular disease    History of mini stroke in 2017  CKD stage 5 on dialysis    Diabetes Management Status    ASA:  Yes - 81 mg     Statin: Taking  ACE/ARB: Not taking    Screening or Prevention Patient's value Goal Complete/Controlled?   HgA1C Testing and Control   Lab Results   Component Value Date    HGBA1C 8.2 (H) 10/03/2019      Annually/Less than 8% No   Lipid profile : 01/30/2020 Annually Yes   LDL control Lab Results   Component Value Date    LDLCALC 214.6 (H) 01/30/2020    Annually/Less than 100 mg/dl  No   Nephropathy screening Lab Results   Component Value Date    LABMICR >5000.0 10/12/2017     Lab Results   Component Value Date    PROTEINUA 3+ (A) 08/28/2018    Annually No   Blood pressure BP Readings from Last 1  Encounters:   01/30/20 129/80    Less than 140/90 Yes   Dilated retinal exam : 09/12/2019 Annually Yes   Foot exam   : 06/25/2020 Annually Yes       CURRENT A1C:    Hemoglobin A1C   Date Value Ref Range Status   10/03/2019 8.2 (H) 4.0 - 5.6 % Final     Comment:     ADA Screening Guidelines:  5.7-6.4%  Consistent with prediabetes  >or=6.5%  Consistent with diabetes  High levels of fetal hemoglobin interfere with the HbA1C  assay. Heterozygous hemoglobin variants (HbS, HgC, etc)do  not significantly interfere with this assay.   However, presence of multiple variants may affect accuracy.     01/16/2019 6.4 % Final   10/23/2018 7.0 (H) 4.0 - 5.6 % Final     Comment:     ADA Screening Guidelines:  5.7-6.4%  Consistent with prediabetes  >or=6.5%  Consistent with diabetes  High levels of fetal hemoglobin interfere with the HbA1C  assay. Heterozygous hemoglobin variants (HbS, HgC, etc)do  not significantly interfere with this assay.   However, presence of multiple variants may affect accuracy.     04/24/2018 7.5 (H) 4.0 - 5.6 % Final     Comment:     According to ADA guidelines, hemoglobin A1c <7.0% represents  optimal control in non-pregnant diabetic patients. Different  metrics may apply to specific patient populations.   Standards of Medical Care in Diabetes-2016.  For the purpose of screening for the presence of diabetes:  <5.7%     Consistent with the absence of diabetes  5.7-6.4%  Consistent with increasing risk for diabetes   (prediabetes)  >or=6.5%  Consistent with diabetes  Currently, no consensus exists for use of hemoglobin A1c  for diagnosis of diabetes for children.  This Hemoglobin A1c assay has significant interference with fetal   hemoglobin   (HbF). The results are invalid for patients with abnormal amounts of   HbF,   including those with known Hereditary Persistence   of Fetal Hemoglobin. Heterozygous hemoglobin variants (HbAS, HbAC,   HbAD, HbAE, HbA2) do not significantly interfere with this assay;    however, presence of multiple variants in a sample may impact the %   interference.         GOAL A1C: 7.5% without hypoglycemia     DM MEDICATIONS USED IN THE PAST: Glipizide XR    Metformin-- stopped due to CKD   Insulin in the past -- Lantus   Novolog 70/30      CURRENT DIABETES MEDICATIONS: Novolog 70/30- 10 units 2 times per day--- 9AM with breakfast and 4-5 PM with dinner   Stopped Glipizide   Insulin: pens   Missed doses: denies       BLOOD GLUCOSE MONITORING:  She is checking her BG 4-5 times per day -- checking 2 hours post prandial   BG readings since starting insulin 2 days ago.   Per oral recall:   7/7/2020--116, 97, 113  7/8/2020- 128, 104, 255(2 hours after she ate)   7/9/2020-163,       HYPOGLYCEMIA:  Denies -- lowest was 97.       MEALS: eating 3 meals per day   Since our visit she has changed her diet drastically   Staying away from carbohydrates   BF:  Oatmeal -cooked on the stove, egg, turkey muller or toast with peanut butter   Staying away from instant oatmeal or grits-- due to dialysis.   Lunch: tuna fish sandwich or turkey sandwich with wheat bread   Dinner: mixed veggies -- grilled chicken or grilled pork chops   Snack: apples with peanut butter   Drinks: 2% milk with breakfast, SF tea, water   No soft drinks        CURRENT EXERCISE:  No      Review of Systems  Review of Systems   Constitutional: Negative for appetite change and fatigue.   HENT: Negative for trouble swallowing.    Eyes: Negative for visual disturbance.   Respiratory: Negative for shortness of breath.    Cardiovascular: Negative for chest pain.   Gastrointestinal: Negative for nausea.   Endocrine: Negative for polydipsia, polyphagia and polyuria.   Genitourinary:        No Nocturia    Musculoskeletal: Positive for back pain.   Skin: Negative for wound.   Neurological: Positive for numbness.       Physical Exam   Physical Exam  Constitutional:       Appearance: Normal appearance.   HENT:      Head: Normocephalic.   Pulmonary:       Effort: Pulmonary effort is normal.   Neurological:      Mental Status: She is alert and oriented to person, place, and time.   Psychiatric:         Mood and Affect: Mood normal.         Behavior: Behavior normal.         Thought Content: Thought content normal.         Judgment: Judgment normal.         FOOT EXAMINATION: deferred due to virtual visit       Lab Results   Component Value Date    TSH 3.238 10/14/2016         Type 2 diabetes mellitus with hyperglycemia, without long-term current use of insulin  Blood sugar readings are markedly improving since dietary changes and now with adding in insulin therapy  She reports prior to starting insulin therapy and being on glipizide blood sugars were running under 200 with dietary changes  Denies any hypoglycemia  + financial difficulties are going to hinder overall diabetes management.    I think she would benefit from the VGo -- however she is very concerned with the cost and does not want to proceed at this time.  She reports that she would like to be on 1 injection that would be able to cover everything.  Discussed with patient that GLP1 would be a good option however the cost is very high when she goes into the donut hole and she does not think she will be able to afford it.  she reports that she cannot afford two separate insulin injections- such as Lantus and NovoLog  Discuss mixed insulin--although not ideal with her renal function--is cost effective  Will get professional CGM to evaluate insulin needs    On top of financial difficulties her treatment options are limited due to her CKD stage 5 on dialysis      -- Medication Changes:    Remain off of glipizide  Continue NovoLog 70/ 30--10 units 2 times per day before breakfast and before dinner.  Discussed the regimen of mixed insulin--such is eating 3 meals per day--not skipping meals or doses.  Instructed her blood sugars were 70 or less to hold insulin      -- Reviewed goals of therapy are to get the  best control we can without hypoglycemia.  -- Reviewed patient's current insulin regimen. Clarified proper insulin dose and timing in relation to meals, etc. Insulin injection sites and proper rotation instructed.    -- Advised frequent self blood glucose monitoring.  Patient encouraged to document glucose results and bring them to every clinic visit.  At least 3 times per day--would like a.c. HS--keep logs  -- Hypoglycemia precautions discussed. Instructed on precautions before driving.    -- Call for Bg repeatedly < 90 or > 180.   -- Close adherence to lifestyle changes recommended.   -- Periodic follow ups for eye evaluations, foot care and dental care suggested.  -- Refer to diabetes education-- insulin teaching and diet-comprehensive review--professional CGM      Type 2 diabetes mellitus with diabetic polyneuropathy, without long-term current use of insulin  Optimize BG readings.   See above.   On gabapentin    Educated patient to check feet daily for any foreign objects and/or wounds. Discussed with patient the importance of wearing appropriate footwear at all times, not to walk barefoot ever, and to check shoes before putting them on feet. Instructed patient to keep feet dry by regularly changing shoes and socks and drying feet after baths and exercises. Also, instructed patient to report any new lesions, discolorations, or swelling to a healthcare professional.        Stage 5 chronic kidney disease on chronic dialysis  Avoid insulin stacking and hypoglycemia  On dialysis Monday Wednesday Friday  Continue to follow with Nephrology  Not currently a candidate for kidney transplant    History of TIA (transient ischemic attack)  Optimize blood sugar readings        Follow up in about 4 weeks (around 8/6/2020).  Virtual visit.   Blood sugar log review  New insulin start  Asked that if transportation becomes available please follow-up for professional CGM     Spent 25 minutes with patient with > 50% time spent in  counseling on insulin administration and diet      No orders of the defined types were placed in this encounter.      Recommendations were discussed with the patient in detail  The patient verbalized understanding and agrees with the plan outlined as above.

## 2020-07-09 NOTE — PROGRESS NOTES
Chart review completed 2020.  Care Everywhere updates requested and reviewed.  Immunizations reconciled. Media reports reviewed.  Duplicate HM overrides and  orders removed.  Overdue HM topic chart audit and/or requested.  Overdue lab testing linked to upcoming lab appointments if applies.    Recently outreached    Health Maintenance Due   Topic Date Due    TETANUS VACCINE  1983    Shingles Vaccine (1 of 2) 2015    Colorectal Cancer Screening  10/05/2017    Urine Microalbumin  2018    Hemoglobin A1c  2020

## 2020-07-09 NOTE — ASSESSMENT & PLAN NOTE
Blood sugar readings are markedly improving since dietary changes and now with adding in insulin therapy  She reports prior to starting insulin therapy and being on glipizide blood sugars were running under 200 with dietary changes  Denies any hypoglycemia  + financial difficulties are going to hinder overall diabetes management.    I think she would benefit from the VGo -- however she is very concerned with the cost and does not want to proceed at this time.  She reports that she would like to be on 1 injection that would be able to cover everything.  Discussed with patient that GLP1 would be a good option however the cost is very high when she goes into the donut hole and she does not think she will be able to afford it.  she reports that she cannot afford two separate insulin injections- such as Lantus and NovoLog  Discuss mixed insulin--although not ideal with her renal function--is cost effective  Will get professional CGM to evaluate insulin needs    On top of financial difficulties her treatment options are limited due to her CKD stage 5 on dialysis      -- Medication Changes:    Remain off of glipizide  Continue NovoLog 70/ 30--10 units 2 times per day before breakfast and before dinner.  Discussed the regimen of mixed insulin--such is eating 3 meals per day--not skipping meals or doses.  Instructed her blood sugars were 70 or less to hold insulin      -- Reviewed goals of therapy are to get the best control we can without hypoglycemia.  -- Reviewed patient's current insulin regimen. Clarified proper insulin dose and timing in relation to meals, etc. Insulin injection sites and proper rotation instructed.    -- Advised frequent self blood glucose monitoring.  Patient encouraged to document glucose results and bring them to every clinic visit.  At least 3 times per day--would like a.c. HS--keep logs  -- Hypoglycemia precautions discussed. Instructed on precautions before driving.    -- Call for Bg repeatedly  < 90 or > 180.   -- Close adherence to lifestyle changes recommended.   -- Periodic follow ups for eye evaluations, foot care and dental care suggested.  -- Refer to diabetes education-- insulin teaching and diet-comprehensive review--professional CGM

## 2020-08-12 ENCOUNTER — PATIENT OUTREACH (OUTPATIENT)
Dept: ADMINISTRATIVE | Facility: OTHER | Age: 55
End: 2020-08-12

## 2020-08-12 NOTE — PROGRESS NOTES
LINKS immunization registry updated  Care Everywhere updated  Health Maintenance updated  Chart reviewed for overdue [facility] health maintenance testing  Chart/Media searched: for outside colon cancer screening results   Orders entered:N/A

## 2020-08-13 ENCOUNTER — OFFICE VISIT (OUTPATIENT)
Dept: DIABETES | Facility: CLINIC | Age: 55
End: 2020-08-13
Payer: MEDICARE

## 2020-08-13 DIAGNOSIS — E11.65 TYPE 2 DIABETES MELLITUS WITH HYPERGLYCEMIA, WITHOUT LONG-TERM CURRENT USE OF INSULIN: Primary | ICD-10-CM

## 2020-08-13 DIAGNOSIS — Z71.9 HEALTH EDUCATION/COUNSELING: ICD-10-CM

## 2020-08-13 DIAGNOSIS — E11.42 TYPE 2 DIABETES MELLITUS WITH DIABETIC POLYNEUROPATHY, WITHOUT LONG-TERM CURRENT USE OF INSULIN: ICD-10-CM

## 2020-08-13 DIAGNOSIS — N18.6 STAGE 5 CHRONIC KIDNEY DISEASE ON CHRONIC DIALYSIS: ICD-10-CM

## 2020-08-13 DIAGNOSIS — Z59.9 FINANCIAL DIFFICULTIES: ICD-10-CM

## 2020-08-13 DIAGNOSIS — Z99.2 STAGE 5 CHRONIC KIDNEY DISEASE ON CHRONIC DIALYSIS: ICD-10-CM

## 2020-08-13 PROCEDURE — 99214 OFFICE O/P EST MOD 30 MIN: CPT | Mod: 95,,, | Performed by: NURSE PRACTITIONER

## 2020-08-13 PROCEDURE — 2024F 7 FLD RTA PHOTO EVC RTNOPTHY: CPT | Mod: 95,,, | Performed by: NURSE PRACTITIONER

## 2020-08-13 PROCEDURE — 99214 PR OFFICE/OUTPT VISIT, EST, LEVL IV, 30-39 MIN: ICD-10-PCS | Mod: 95,,, | Performed by: NURSE PRACTITIONER

## 2020-08-13 PROCEDURE — 2024F PR 7 FIELD PHOTOS WITH INTERP/ REVIEW: ICD-10-PCS | Mod: 95,,, | Performed by: NURSE PRACTITIONER

## 2020-08-13 PROCEDURE — 3052F HG A1C>EQUAL 8.0%<EQUAL 9.0%: CPT | Mod: CPTII,95,, | Performed by: NURSE PRACTITIONER

## 2020-08-13 PROCEDURE — 3052F PR MOST RECENT HEMOGLOBIN A1C LEVEL 8.0 - < 9.0%: ICD-10-PCS | Mod: CPTII,95,, | Performed by: NURSE PRACTITIONER

## 2020-08-13 RX ORDER — FLASH GLUCOSE SCANNING READER
EACH MISCELLANEOUS
Qty: 1 EACH | Refills: 0 | Status: SHIPPED | OUTPATIENT
Start: 2020-08-13 | End: 2021-01-19

## 2020-08-13 RX ORDER — FLASH GLUCOSE SENSOR
KIT MISCELLANEOUS
Qty: 2 KIT | Refills: 11 | Status: SHIPPED | OUTPATIENT
Start: 2020-08-13 | End: 2021-01-19

## 2020-08-13 RX ORDER — INSULIN ASPART 100 [IU]/ML
INJECTION, SUSPENSION SUBCUTANEOUS
Qty: 15 SYRINGE | Refills: 2 | Status: SHIPPED | OUTPATIENT
Start: 2020-08-13 | End: 2020-10-26 | Stop reason: SDUPTHER

## 2020-08-13 SDOH — SOCIAL DETERMINANTS OF HEALTH (SDOH): PROBLEM RELATED TO HOUSING AND ECONOMIC CIRCUMSTANCES, UNSPECIFIED: Z59.9

## 2020-08-13 NOTE — Clinical Note
1. Can we please get her A1c level from her dialysis center - [facility]   2. Can we please mail her a bunch of blood sugar logs with one of my business cards.   3. Follow up with me in 3 months please

## 2020-08-13 NOTE — ASSESSMENT & PLAN NOTE
Uncontrolled   A1c is down to 7.9%- improvement and closer to goal.   + dietary indiscretions are hindering overall diabetes management  + financial difficulties and transportation issues are going to hinder overall diabetes management.      I think she would benefit from the VGo -- however she is very concerned with the cost and does not want to proceed at this time.  She reports that she would like to be on 1 injection that would be able to cover everything.  Discussed with patient that GLP1 would be a good option however the cost is very high when she goes into the donut hole and she does not think she will be able to afford it.  she reports that she cannot afford two separate insulin injections- such as Lantus and NovoLog  Discuss mixed insulin--although not ideal with her renal function--is cost effective  Will get professional CGM to evaluate insulin needs when she is able to get one done- due to transportation issues    On top of financial difficulties her treatment options are limited due to her CKD stage 5 on dialysis      -- Medication Changes:    Adjust NovoLog 70/ 30--12 units before breakfast and continued 10 units before dinner.   Discussed the regimen of mixed insulin--such is eating 3 meals per day--not skipping meals or doses.  Instructed her blood sugars were 70 or less to hold insulin  Focus on diet       -- Reviewed goals of therapy are to get the best control we can without hypoglycemia.  -- Reviewed patient's current insulin regimen. Clarified proper insulin dose and timing in relation to meals, etc. Insulin injection sites and proper rotation instructed.    -- Advised frequent self blood glucose monitoring.  Patient encouraged to document glucose results and bring them to every clinic visit.  At least 4 times per day--would like a.c. HS--keep logs. Will send in an RX for the freestyle kaylah to  [facility]   -- Hypoglycemia precautions discussed. Instructed on precautions before  driving.    -- Call for Bg repeatedly < 90 or > 180.   -- Close adherence to lifestyle changes recommended.   -- Periodic follow ups for eye evaluations, foot care and dental care suggested.  -- Refer to diabetes education-- insulin teaching and diet-comprehensive review--professional CGM    Patient would greatly benefit from a personal CGM such as the freestyle kaylah as she is frequently adjusting her insulin doses and has chronic kidney disease stage 5 on dialysis.  Being able to frequently monitor her bl CGM would be paramount to her care.

## 2020-08-13 NOTE — PROGRESS NOTES
The patient location is: [place], [place]   The chief complaint leading to consultation is: Diabetes Management - follow up     Visit type: audiovisual    Face to Face time with patient: 25 minutes   35 minutes of total time spent on the encounter, which includes face to face time and non-face to face time preparing to see the patient (eg, review of tests), Obtaining and/or reviewing separately obtained history, Documenting clinical information in the electronic or other health record, Independently interpreting results (not separately reported) and communicating results to the patient/family/caregiver, or Care coordination (not separately reported).     Each patient to whom he or she provides medical services by telemedicine is:  (1) informed of the relationship between the physician and patient and the respective role of any other health care provider with respect to management of the patient; and (2) notified that he or she may decline to receive medical services by telemedicine and may withdraw from such care at any time.    Notes:       CC:   Chief Complaint   Patient presents with    Diabetes Mellitus     virtual follow up        HPI: Leanna García is a 55 y.o. female presents for a follow up visit today for the management of T2DM.     She was diagnosed with Type 2  diabetes > 20 years old on routine lab work. She was initially started on Metformin. She was on insulin therapy years ago. She reports insulin was stopped and switched to Glipizide.   She has CKD stage 5 on  HD- MWF-not a candidate for kidney transplant at this time due to lack of social support.  She has met with the transplant team    We had a virtual visit in July 2020--it was a 2 week follow-up after insulin start.  She is unable to get the professional C  due to transportation issues  We continued her NovoLog 70 /30--10 units 2 times per day  Today we are having a virtual visit as she still is struggling with transportation  issues  Reportedly her A1c is down to 7.9% on dialysis labs--- labs drawn per [facility] dialysis center in [place].   She reports her blood sugar readings have improved--but she is still having elevations due to poor dietary choices.  She reports she is under a lot of stress due to her 's illness as well as her own illness.  She has been eating poorly.  During our virtual visit today she called out her blood sugar readings at her on logs.  Some are postprandial readings.  She had a lot of glucose variability likely related to dietary choices.  She reports she has been compliant with her insulin.  She has not met with diabetes education--needs to set up a virtual visit  She is interested in a personal CGM if possible.     Family hx of diabetes: 2 sisters, father- prediabetes , mother, and maternal grandmother   Hospitalized for diabetes: years ago due to hypoglycemia     No personal or FH of thyroid cancer or personal of pancreatic cancer or pancreatitis.     DIABETES COMPLICATIONS: nephropathy, peripheral neuropathy and cerebrovascular disease    History of mini stroke in 2017  CKD stage 5 on dialysis    Diabetes Management Status    ASA:  Yes - 81 mg     Statin: Taking  ACE/ARB: Not taking    Screening or Prevention Patient's value Goal Complete/Controlled?   HgA1C Testing and Control   Lab Results   Component Value Date    HGBA1C 8.2 (H) 10/03/2019      Annually/Less than 8% No   Lipid profile : 01/30/2020 Annually Yes   LDL control Lab Results   Component Value Date    LDLCALC 214.6 (H) 01/30/2020    Annually/Less than 100 mg/dl  No   Nephropathy screening Lab Results   Component Value Date    LABMICR >5000.0 10/12/2017     Lab Results   Component Value Date    PROTEINUA 3+ (A) 08/28/2018    Annually No   Blood pressure BP Readings from Last 1 Encounters:   01/30/20 129/80    Less than 140/90 Yes   Dilated retinal exam : 09/12/2019 Annually Yes   Foot exam   : 06/25/2020 Annually Yes       CURRENT A1C:     Hemoglobin A1C   Date Value Ref Range Status   10/03/2019 8.2 (H) 4.0 - 5.6 % Final     Comment:     ADA Screening Guidelines:  5.7-6.4%  Consistent with prediabetes  >or=6.5%  Consistent with diabetes  High levels of fetal hemoglobin interfere with the HbA1C  assay. Heterozygous hemoglobin variants (HbS, HgC, etc)do  not significantly interfere with this assay.   However, presence of multiple variants may affect accuracy.     01/16/2019 6.4 % Final   10/23/2018 7.0 (H) 4.0 - 5.6 % Final     Comment:     ADA Screening Guidelines:  5.7-6.4%  Consistent with prediabetes  >or=6.5%  Consistent with diabetes  High levels of fetal hemoglobin interfere with the HbA1C  assay. Heterozygous hemoglobin variants (HbS, HgC, etc)do  not significantly interfere with this assay.   However, presence of multiple variants may affect accuracy.     04/24/2018 7.5 (H) 4.0 - 5.6 % Final     Comment:     According to ADA guidelines, hemoglobin A1c <7.0% represents  optimal control in non-pregnant diabetic patients. Different  metrics may apply to specific patient populations.   Standards of Medical Care in Diabetes-2016.  For the purpose of screening for the presence of diabetes:  <5.7%     Consistent with the absence of diabetes  5.7-6.4%  Consistent with increasing risk for diabetes   (prediabetes)  >or=6.5%  Consistent with diabetes  Currently, no consensus exists for use of hemoglobin A1c  for diagnosis of diabetes for children.  This Hemoglobin A1c assay has significant interference with fetal   hemoglobin   (HbF). The results are invalid for patients with abnormal amounts of   HbF,   including those with known Hereditary Persistence   of Fetal Hemoglobin. Heterozygous hemoglobin variants (HbAS, HbAC,   HbAD, HbAE, HbA2) do not significantly interfere with this assay;   however, presence of multiple variants in a sample may impact the %   interference.         GOAL A1C: 7.5% without hypoglycemia     DM MEDICATIONS USED IN THE PAST:  Glipizide XR    Metformin-- stopped due to CKD   Insulin in the past -- Lantus   Novolog 70/30      CURRENT DIABETES MEDICATIONS: Novolog 70/30- 10 units 2 times per day--- 9AM with breakfast and 4-5 PM with dinner   Insulin: pens   Missed doses: denies       BLOOD GLUCOSE MONITORING:  She is checking her BG 4 times per day -- checking 2 hours post prandial   Last readings are 1-2 hours after a meal.   Per oral recall:   178, 277, 161, 159,   139, 203, 208, 215  104, 165, 183  180, 135, 185  180, 131, 215  179, 136, 185  116, 143, 154  156, 153, 144  139, 183, 231  94, 151, 179  122, 118, 230  126, 311, 191  156, 172, 150  165, 360, 200  123, 144, 247  174, 128, 277  162, 213, 178  185, 144, 204  230, 132, 228  185, 164, 348  214, 216, 189   116, 132, 305  215, 168, 258  110, 211, 164  115, 310, 307  270, 187, 117  237, 120, 176  116 this AM        HYPOGLYCEMIA:  Denies lowest was 94      MEALS: eating 3 meals per day   + dietary indiscretions.   Snack: apples with peanut butter   Drinks: 2% milk with breakfast, SF tea, water   No soft drinks        CURRENT EXERCISE:  No      Review of Systems  Review of Systems   Constitutional: Negative for appetite change and fatigue.   HENT: Negative for trouble swallowing.    Eyes: Negative for visual disturbance.   Respiratory: Negative for shortness of breath.    Cardiovascular: Negative for chest pain.   Gastrointestinal: Negative for nausea.   Endocrine: Negative for polydipsia, polyphagia and polyuria.   Genitourinary:        No Nocturia    Musculoskeletal: Positive for back pain.   Skin: Negative for wound.   Neurological: Positive for numbness.       Physical Exam   Physical Exam  Constitutional:       Appearance: Normal appearance.   HENT:      Head: Normocephalic.   Pulmonary:      Effort: Pulmonary effort is normal.   Neurological:      Mental Status: She is alert and oriented to person, place, and time.   Psychiatric:         Mood and Affect: Mood normal.          Behavior: Behavior normal.         Thought Content: Thought content normal.         Judgment: Judgment normal.         FOOT EXAMINATION: deferred due to virtual visit       Lab Results   Component Value Date    TSH 3.238 10/14/2016         Type 2 diabetes mellitus with hyperglycemia, without long-term current use of insulin  Uncontrolled   A1c is down to 7.9%- improvement and closer to goal.   + dietary indiscretions are hindering overall diabetes management  + financial difficulties and transportation issues are going to hinder overall diabetes management.      I think she would benefit from the VGo -- however she is very concerned with the cost and does not want to proceed at this time.  She reports that she would like to be on 1 injection that would be able to cover everything.  Discussed with patient that GLP1 would be a good option however the cost is very high when she goes into the donut hole and she does not think she will be able to afford it.  she reports that she cannot afford two separate insulin injections- such as Lantus and NovoLog  Discuss mixed insulin--although not ideal with her renal function--is cost effective  Will get professional CGM to evaluate insulin needs when she is able to get one done- due to transportation issues    On top of financial difficulties her treatment options are limited due to her CKD stage 5 on dialysis      -- Medication Changes:    Adjust NovoLog 70/ 30--12 units before breakfast and continued 10 units before dinner.   Discussed the regimen of mixed insulin--such is eating 3 meals per day--not skipping meals or doses.  Instructed her blood sugars were 70 or less to hold insulin  Focus on diet       -- Reviewed goals of therapy are to get the best control we can without hypoglycemia.  -- Reviewed patient's current insulin regimen. Clarified proper insulin dose and timing in relation to meals, etc. Insulin injection sites and proper rotation instructed.    -- Advised  frequent self blood glucose monitoring.  Patient encouraged to document glucose results and bring them to every clinic visit.  At least 4 times per day--would like a.c. HS--keep logs. Will send in an RX for the freestyle kaylah to  [facility]   -- Hypoglycemia precautions discussed. Instructed on precautions before driving.    -- Call for Bg repeatedly < 90 or > 180.   -- Close adherence to lifestyle changes recommended.   -- Periodic follow ups for eye evaluations, foot care and dental care suggested.  -- Refer to diabetes education-- insulin teaching and diet-comprehensive review--professional CGM    Patient would greatly benefit from a personal CGM such as the freestyle kaylah as she is frequently adjusting her insulin doses and has chronic kidney disease stage 5 on dialysis.  Being able to frequently monitor her bl CGM would be paramount to her care.    Type 2 diabetes mellitus with diabetic polyneuropathy, without long-term current use of insulin  Optimize BG readings.   See above.   On gabapentin    Educated patient to check feet daily for any foreign objects and/or wounds. Discussed with patient the importance of wearing appropriate footwear at all times, not to walk barefoot ever, and to check shoes before putting them on feet. Instructed patient to keep feet dry by regularly changing shoes and socks and drying feet after baths and exercises. Also, instructed patient to report any new lesions, discolorations, or swelling to a healthcare professional.        Stage 5 chronic kidney disease on chronic dialysis  Avoid insulin stacking and hypoglycemia  On dialysis Monday Wednesday Friday  Continue to follow with Nephrology  Not currently a candidate for kidney transplant    Spent 25 minutes with patient with > 50% time spent in counseling on diet and personal CGM       Follow up in about 3 months (around 11/13/2020).     Asked that if transportation becomes available please follow-up for professional CGM          Orders Placed This Encounter   Procedures    Ambulatory referral/consult to Diabetes Education     Standing Status:   Future     Standing Expiration Date:   8/13/2021     Referral Priority:   Routine     Referral Type:   Consultation     Referral Reason:   Specialty Services Required     Referred to Provider:   Carmina Castorena RD, CDE     Requested Specialty:   Diabetes     Number of Visits Requested:   1       Recommendations were discussed with the patient in detail  The patient verbalized understanding and agrees with the plan outlined as above.

## 2020-08-14 ENCOUNTER — TELEPHONE (OUTPATIENT)
Dept: DIABETES | Facility: CLINIC | Age: 55
End: 2020-08-14

## 2020-08-27 ENCOUNTER — CLINICAL SUPPORT (OUTPATIENT)
Dept: DIABETES | Facility: CLINIC | Age: 55
End: 2020-08-27
Payer: MEDICARE

## 2020-08-27 VITALS — WEIGHT: 216.94 LBS | HEIGHT: 67 IN | BODY MASS INDEX: 34.05 KG/M2

## 2020-08-27 DIAGNOSIS — E11.65 TYPE 2 DIABETES MELLITUS WITH HYPERGLYCEMIA, WITHOUT LONG-TERM CURRENT USE OF INSULIN: ICD-10-CM

## 2020-08-27 DIAGNOSIS — E11.21 DIABETES MELLITUS WITH NEPHROPATHY: ICD-10-CM

## 2020-08-27 DIAGNOSIS — E11.42 TYPE 2 DIABETES MELLITUS WITH DIABETIC POLYNEUROPATHY, WITHOUT LONG-TERM CURRENT USE OF INSULIN: Primary | ICD-10-CM

## 2020-08-27 PROCEDURE — G0108 PR DIAB MANAGE TRN  PER INDIV: ICD-10-PCS | Mod: 95,,, | Performed by: DIETITIAN, REGISTERED

## 2020-08-27 PROCEDURE — G0108 DIAB MANAGE TRN  PER INDIV: HCPCS | Mod: 95,,, | Performed by: DIETITIAN, REGISTERED

## 2020-08-27 NOTE — PROGRESS NOTES
Diabetes Education  Author: Carmina Castorena RD, CDE  Date: 8/27/2020    Diabetes Care Management Summary  Diabetes Education Record Assessment/Progress: Initial  Current Diabetes Risk Level: Moderate     Diabetes Care Specialist Virtual Visit Note   It was in the patient's best interest to receive diabetes self-management education and support services in this format to prevent the exposure to COVID-19.        The patient location is: home   The chief complaint leading to consultation is: Diabetes  Visit type: audiovisual  Total time spent with patient: 45 min   Each patient to whom he or she provides medical services by telemedicine is:  (1) informed of the relationship between the physician and patient and the respective role of any other health care provider with respect to management of the patient; and (2) notified that he or she may decline to receive medical services by telemedicine and may withdraw from such care at any time.    Last A1c:   Lab Results   Component Value Date    HGBA1C 8.2 (H) 10/03/2019    HGBA1C 6.4 01/16/2019     Last visit with Diabetes Educator: Last Education Visit: Not Found      Diabetes Type  Diabetes Type : Type II    Diabetes History  Diabetes Diagnosis: >10 years  Current Treatment: Diet, Insulin  Reviewed Problem List with Patient: Yes    Health Maintenance was reviewed today with patient. Discussed with patient importance of routine eye exams, foot exams/foot care, blood work (i.e.: A1c, microalbumin, and lipid), dental visits, yearly flu vaccine, and pneumonia vaccine as indicated by PCP. Patient verbalized understanding.     Health Maintenance Topics with due status: Not Due       Topic Last Completion Date    Influenza Vaccine 09/16/2019    Mammogram 10/29/2019    Lipid Panel 01/30/2020    High Dose Statin 03/24/2020    Foot Exam 06/25/2020     Health Maintenance Due   Topic Date Due    TETANUS VACCINE  08/12/1983    Shingles Vaccine (1 of 2) 08/12/2015    Colorectal Cancer  Screening  10/05/2017    Urine Microalbumin  12/16/2018    Hemoglobin A1c  01/03/2020    Eye Exam  09/12/2020       Nutrition  Meal Planning: water, 3 meals per day, diet drinks, artificial sweeteners, eats out seldom, snacks between meal  What type of beverages do you drink?: diet soda/tea, regular soda/tea, water, juice(sprite zero, SF tea, apple juice, water)  Meal Plan 24 Hour Recall - Breakfast: 2 bowls cereal (Raisin bran or granola or miguel charms) and milk, or oatmeal  Meal Plan 24 Hour Recall - Lunch: turkey sandwich  Meal Plan 24 Hour Recall - Dinner: mashed potatoes, ribeye chops, and green beans  Meal Plan 24 Hour Recall - Snack: apple and peanut butter    Monitoring   Self Monitoring : twice a day - states am running around 171, hs running 200s to 300s  Blood Glucose Logs: No  Do you use a personal continuous glucose monitor?: No  In the last month, how often have you had a low blood sugar reaction?: once  What are your symptoms of low blood sugar?: disoriented, cold but sweating  How do you treat low blood sugar?: snack cake or apple juice  Can you tell when your blood sugar is too high?: no  How do you treat high blood sugar?: insulin    Exercise   Exercise Type: none  Frequency: Never    Current Diabetes Treatment   Current Treatment: Diet, Insulin    Social History  Preferred Learning Method: Face to Face  Primary Support: Self, Spouse  Educational Level: High School  Smoking Status: Never a Smoker  Alcohol Use: Never            DDS-2 Score  ( > 3 = SIGNIFICANT DISTRESS): 2.5                   Barriers to Change  Barriers to Change: Financial  Learning Challenges : Literacy    Readiness to Learn   Readiness to Learn : Acceptance    Cultural Influences  Cultural Influences: No    Diabetes Education Assessment/Progress  Diabetes Disease Process (diabetes disease process and treatment options): Individual Session, Written Materials Provided, Instructed, Discussion, Comprehends Key Points  Nutrition  (Incorporating nutritional management into one's lifestyle): Individual Session, Written Materials Provided, Instructed, Discussion, Comprehends Key Points  Physical Activity (incorporating physical activity into one's lifestyle): Individual Session, Written Materials Provided, Instructed, Discussion, Comprehends Key Points  Medications (states correct name, dose, onset, peak, duration, side effects & timing of meds): Individual Session, Written Materials Provided, Instructed, Discussion, Comprehends Key Points  Monitoring (monitoring blood glucose/other parameters & using results): Individual Session, Written Materials Provided, Instructed, Discussion, Comprehends Key Points  Acute Complications (preventing, detecting, and treating acute complications): Individual Session, Written Materials Provided, Instructed, Discussion, Comprehends Key Points  Chronic Complications (preventing, detecting, and treating chronic complications): Individual Session, Written Materials Provided, Instructed, Discussion, Comprehends Key Points  Clinical (diabetes, other pertinent medical history, and relevant comorbidities reviewed during visit): Individual Session, Written Materials Provided, Instructed, Discussion, Comprehends Key Points  Cognitive (knowledge of self-management skills, functional health literacy): Individual Session, Instructed, Discussion, Comprehends Key Points, Written Materials Provided  Psychosocial (emotional response to diabetes): Individual Session, Written Materials Provided, Instructed, Discussion, Comprehends Key Points  Diabetes Distress and Support Systems: Individual Session, Written Materials Provided, Instructed, Discussion, Comprehends Key Points  Behavioral (readiness for change, lifestyle practices, self-care behaviors): Individual Session, Written Materials Provided, Instructed, Discussion, Comprehends Key Points  Patient educated on what is DM, T1DM, T2DM, risk factors, managing DM, DM diet,  carbohydrate counting, meal planning, reading a food label, healthy snack options, benefits of physical activity, diabetes care schedule, foot care guidelines, diabetes and retinopathy screening, s/s hypo and hyperglycemia, long/short term complication of uncontrolled DM, importance of compliance with treatment plan, how to use a glucometer, reviewed understanding diabetes distress, medications for treating DM, their mechanism of action and possible side effects, reviewed current level and goal level for HgbA1c, blood glucose, microalbumin, and lipids. Patient provided with written literature, diabetes management resources and support, DM Management program contact information.    Goals  Patient has selected/evaluated goals during today's session: Yes, selected  Healthy Eating: Set  Start Date: 08/27/20  Target Date: 09/27/20         Diabetes Care Plan/Intervention  Education Plan/Intervention: Individual Follow-Up DSMT    Diabetes Meal Plan  Restrictions: Low Fat, Low Sodium, Restricted Carbohydrate  Calories: 1800  Carbohydrate Per Meal: 30-45g  Carbohydrate Per Snack : 7-15g  Fat: 50  Protein: 135    Today's Self-Management Care Plan was developed with the patient's input and is based on barriers identified during today's assessment.    The long and short-term goals in the care plan were written with the patient/caregiver's input. The patient has agreed to work toward these goals to improve her overall diabetes control.      The patient received a copy of today's self-management plan and verbalized understanding of the care plan, goals, and all of today's instructions.      The patient was encouraged to communicate with her physician and care team regarding her condition(s) and treatment.  I provided the patient with my contact information today and encouraged her to contact me via phone or patient portal as needed.     Education Units of Time   Time Spent: 60 min

## 2020-10-05 ENCOUNTER — PATIENT MESSAGE (OUTPATIENT)
Dept: ADMINISTRATIVE | Facility: HOSPITAL | Age: 55
End: 2020-10-05

## 2020-10-26 DIAGNOSIS — E11.65 TYPE 2 DIABETES MELLITUS WITH HYPERGLYCEMIA, WITHOUT LONG-TERM CURRENT USE OF INSULIN: ICD-10-CM

## 2020-10-26 RX ORDER — PEN NEEDLE, DIABETIC 30 GX3/16"
NEEDLE, DISPOSABLE MISCELLANEOUS
Qty: 450 EACH | Refills: 2 | Status: SHIPPED | OUTPATIENT
Start: 2020-10-26 | End: 2021-01-19 | Stop reason: SDUPTHER

## 2020-10-26 RX ORDER — INSULIN ASPART 100 [IU]/ML
INJECTION, SUSPENSION SUBCUTANEOUS
Qty: 15 SYRINGE | Refills: 2 | Status: SHIPPED | OUTPATIENT
Start: 2020-10-26 | End: 2020-11-12

## 2020-11-11 ENCOUNTER — PATIENT OUTREACH (OUTPATIENT)
Dept: ADMINISTRATIVE | Facility: OTHER | Age: 55
End: 2020-11-11

## 2020-11-11 NOTE — PROGRESS NOTES
LINKS immunization registry not responding  Care Everywhere updated  Health Maintenance updated  Chart reviewed for overdue Proactive [facility] Encounters (DEBBIE) health maintenance testing (CRS, Breast Ca, Diabetic Eye Exam)   Orders entered:N/A  9/12/19 diabetic eye screening resulted in inadequate images requires eye exam.  Diabetic eye screening photo order not entered

## 2020-11-12 ENCOUNTER — TELEPHONE (OUTPATIENT)
Dept: DIABETES | Facility: CLINIC | Age: 55
End: 2020-11-12

## 2020-11-12 ENCOUNTER — OFFICE VISIT (OUTPATIENT)
Dept: DIABETES | Facility: CLINIC | Age: 55
End: 2020-11-12
Payer: MEDICARE

## 2020-11-12 VITALS — BODY MASS INDEX: 33.42 KG/M2 | WEIGHT: 213.38 LBS

## 2020-11-12 DIAGNOSIS — Z99.2 STAGE 5 CHRONIC KIDNEY DISEASE ON CHRONIC DIALYSIS: ICD-10-CM

## 2020-11-12 DIAGNOSIS — E11.65 TYPE 2 DIABETES MELLITUS WITH HYPERGLYCEMIA, WITHOUT LONG-TERM CURRENT USE OF INSULIN: Primary | ICD-10-CM

## 2020-11-12 DIAGNOSIS — Z86.73 HISTORY OF TIA (TRANSIENT ISCHEMIC ATTACK): ICD-10-CM

## 2020-11-12 DIAGNOSIS — N18.6 STAGE 5 CHRONIC KIDNEY DISEASE ON CHRONIC DIALYSIS: ICD-10-CM

## 2020-11-12 DIAGNOSIS — E66.9 OBESITY (BMI 30.0-34.9): ICD-10-CM

## 2020-11-12 DIAGNOSIS — E11.42 TYPE 2 DIABETES MELLITUS WITH DIABETIC POLYNEUROPATHY, WITHOUT LONG-TERM CURRENT USE OF INSULIN: ICD-10-CM

## 2020-11-12 PROCEDURE — 99214 PR OFFICE/OUTPT VISIT, EST, LEVL IV, 30-39 MIN: ICD-10-PCS | Mod: 95,,, | Performed by: NURSE PRACTITIONER

## 2020-11-12 PROCEDURE — 99214 OFFICE O/P EST MOD 30 MIN: CPT | Mod: 95,,, | Performed by: NURSE PRACTITIONER

## 2020-11-12 RX ORDER — INSULIN GLARGINE AND LIXISENATIDE 100; 33 U/ML; UG/ML
INJECTION, SOLUTION SUBCUTANEOUS
Qty: 5 SYRINGE | Refills: 0 | Status: SHIPPED | OUTPATIENT
Start: 2020-11-12 | End: 2021-01-19 | Stop reason: SDUPTHER

## 2020-11-12 NOTE — ASSESSMENT & PLAN NOTE
Uncontrolled   + dietary indiscretions are hindering overall diabetes management  + financial difficulties and transportation issues are going to hinder overall diabetes management.      Will get professional CGM to evaluate insulin needs when she is able to get one done- due to transportation issues    On top of financial difficulties her treatment options are limited due to her CKD stage 5 on dialysis      -- Medication Changes:    Will try Soliqua as I have a voucher to make it free for 1 month   Stop Mixed insulin.     Soliqua Instructions:    Start 15 units daily (in the AM)     - In 1 week if blood sugars fasting are not less than 150   Increase to 18 units daily.    In 1 week if blood sugars are not less than 150  Increase to 22 units daily.    In 1 week if blood sugars are not less than 150   Increase to 25 units daily         -- Reviewed goals of therapy are to get the best control we can without hypoglycemia.  -- Reviewed patient's current insulin regimen. Clarified proper insulin dose and timing in relation to meals, etc. Insulin injection sites and proper rotation instructed.    -- Advised frequent self blood glucose monitoring.  Patient encouraged to document glucose results and bring them to every clinic visit.  At least 4 times per day--would like a.c. HS--keep logs.   -- Hypoglycemia precautions discussed. Instructed on precautions before driving.    -- Call for Bg repeatedly < 90 or > 180.   -- Close adherence to lifestyle changes recommended.   -- Periodic follow ups for eye evaluations, foot care and dental care suggested.    Patient would greatly benefit from a personal CGM such as the freestyle kaylah as she is frequently adjusting her insulin doses and has chronic kidney disease stage 5 on dialysis.  Being able to frequently monitor her bl CGM would be paramount to her care.

## 2020-11-12 NOTE — ASSESSMENT & PLAN NOTE
Body mass index is 33.42 kg/m².  Increases insulin resistance.   Discussed DM diet and exercise.

## 2020-11-12 NOTE — TELEPHONE ENCOUNTER
I called patient's [facility], spoke with Janki and gave Soliqua free trial voucher numbers as- [id number] [employer] [id number] and ID [id number].  Janki stated that 2nd and future rxs will have to go through insurance.
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
## 2020-12-17 ENCOUNTER — PATIENT OUTREACH (OUTPATIENT)
Dept: ADMINISTRATIVE | Facility: OTHER | Age: 55
End: 2020-12-17

## 2020-12-17 NOTE — PROGRESS NOTES
LINKS immunization registry not responding  Care Everywhere updated  Health Maintenance updated  Chart reviewed for overdue Proactive [facility] Encounters (DEBBIE) health maintenance testing (CRS, Breast Ca, Diabetic Eye Exam)   Orders entered:N/A

## 2021-01-04 ENCOUNTER — PATIENT MESSAGE (OUTPATIENT)
Dept: ADMINISTRATIVE | Facility: HOSPITAL | Age: 56
End: 2021-01-04

## 2021-01-19 ENCOUNTER — OFFICE VISIT (OUTPATIENT)
Dept: DIABETES | Facility: CLINIC | Age: 56
End: 2021-01-19
Payer: MEDICARE

## 2021-01-19 ENCOUNTER — PATIENT OUTREACH (OUTPATIENT)
Dept: ADMINISTRATIVE | Facility: OTHER | Age: 56
End: 2021-01-19

## 2021-01-19 DIAGNOSIS — N18.6 STAGE 5 CHRONIC KIDNEY DISEASE ON CHRONIC DIALYSIS: ICD-10-CM

## 2021-01-19 DIAGNOSIS — E11.65 TYPE 2 DIABETES MELLITUS WITH HYPERGLYCEMIA, WITHOUT LONG-TERM CURRENT USE OF INSULIN: Primary | ICD-10-CM

## 2021-01-19 DIAGNOSIS — Z71.9 HEALTH EDUCATION/COUNSELING: ICD-10-CM

## 2021-01-19 DIAGNOSIS — E11.42 TYPE 2 DIABETES MELLITUS WITH DIABETIC POLYNEUROPATHY, WITHOUT LONG-TERM CURRENT USE OF INSULIN: ICD-10-CM

## 2021-01-19 DIAGNOSIS — Z99.2 STAGE 5 CHRONIC KIDNEY DISEASE ON CHRONIC DIALYSIS: ICD-10-CM

## 2021-01-19 PROCEDURE — 99214 PR OFFICE/OUTPT VISIT, EST, LEVL IV, 30-39 MIN: ICD-10-PCS | Mod: 95,,, | Performed by: NURSE PRACTITIONER

## 2021-01-19 PROCEDURE — 99214 OFFICE O/P EST MOD 30 MIN: CPT | Mod: 95,,, | Performed by: NURSE PRACTITIONER

## 2021-01-19 RX ORDER — LANCETS
EACH MISCELLANEOUS
Qty: 450 EACH | Refills: 3 | Status: ON HOLD | OUTPATIENT
Start: 2021-01-19 | End: 2022-06-30 | Stop reason: HOSPADM

## 2021-01-19 RX ORDER — INSULIN ASPART 100 [IU]/ML
INJECTION, SOLUTION INTRAVENOUS; SUBCUTANEOUS
Qty: 3 BOX | Refills: 2 | Status: ON HOLD | OUTPATIENT
Start: 2021-01-19 | End: 2021-05-22 | Stop reason: SDUPTHER

## 2021-01-19 RX ORDER — PEN NEEDLE, DIABETIC 30 GX3/16"
NEEDLE, DISPOSABLE MISCELLANEOUS
Qty: 450 EACH | Refills: 2 | Status: SHIPPED | OUTPATIENT
Start: 2021-01-19 | End: 2022-10-13 | Stop reason: SDUPTHER

## 2021-01-19 RX ORDER — INSULIN GLARGINE AND LIXISENATIDE 100; 33 U/ML; UG/ML
INJECTION, SOLUTION SUBCUTANEOUS
Qty: 9 SYRINGE | Refills: 2 | Status: SHIPPED | OUTPATIENT
Start: 2021-01-19 | End: 2021-02-03

## 2021-01-20 LAB — HBA1C MFR BLD: 9.5 % (ref 4–6)

## 2021-02-02 ENCOUNTER — TELEPHONE (OUTPATIENT)
Dept: DIABETES | Facility: CLINIC | Age: 56
End: 2021-02-02

## 2021-02-02 DIAGNOSIS — E11.65 TYPE 2 DIABETES MELLITUS WITH HYPERGLYCEMIA, WITHOUT LONG-TERM CURRENT USE OF INSULIN: Primary | ICD-10-CM

## 2021-02-02 RX ORDER — LANCETS 26 GAUGE
EACH MISCELLANEOUS
Qty: 400 EACH | Refills: 3 | Status: SHIPPED | OUTPATIENT
Start: 2021-02-02 | End: 2022-05-04

## 2021-02-03 RX ORDER — INSULIN DETEMIR 100 [IU]/ML
20 INJECTION, SOLUTION SUBCUTANEOUS NIGHTLY
Qty: 2 BOX | Refills: 3 | Status: ON HOLD | OUTPATIENT
Start: 2021-02-03 | End: 2021-05-22 | Stop reason: SDUPTHER

## 2021-02-04 ENCOUNTER — TELEPHONE (OUTPATIENT)
Dept: PHARMACY | Facility: CLINIC | Age: 56
End: 2021-02-04

## 2021-02-09 ENCOUNTER — TELEPHONE (OUTPATIENT)
Dept: DIABETES | Facility: CLINIC | Age: 56
End: 2021-02-09

## 2021-02-10 ENCOUNTER — TELEPHONE (OUTPATIENT)
Dept: DIABETES | Facility: CLINIC | Age: 56
End: 2021-02-10

## 2021-02-18 ENCOUNTER — TELEPHONE (OUTPATIENT)
Dept: DIABETES | Facility: CLINIC | Age: 56
End: 2021-02-18

## 2021-02-19 ENCOUNTER — PATIENT OUTREACH (OUTPATIENT)
Dept: ADMINISTRATIVE | Facility: HOSPITAL | Age: 56
End: 2021-02-19

## 2021-03-05 ENCOUNTER — TELEPHONE (OUTPATIENT)
Dept: DIABETES | Facility: CLINIC | Age: 56
End: 2021-03-05

## 2021-03-05 ENCOUNTER — PATIENT OUTREACH (OUTPATIENT)
Dept: ADMINISTRATIVE | Facility: OTHER | Age: 56
End: 2021-03-05

## 2021-03-19 ENCOUNTER — PATIENT OUTREACH (OUTPATIENT)
Dept: ADMINISTRATIVE | Facility: HOSPITAL | Age: 56
End: 2021-03-19

## 2021-03-29 NOTE — DISCHARGE INSTRUCTIONS
Thank you for choosing [facility] for your medical care. The primary doctor who is taking care of you at the time of your discharge is Arslan Snyder MD.     You were admitted to the hospital with Hypertensive urgency.     Please note your discharge instructions, including diet/activity restrictions, follow-up appointments, and medication changes.  If you have any questions about your medical issues, prescriptions, or any other questions, please feel free to contact the [facility] Medicine Dept at [phone] and we will help. Please direct all long term medication refills and follow up to your primary care provider, Nolberto Lomax MD. Thank you again for letting us take care of your health care needs.    Please note the following discharge instructions per your discharging physician-  Yani Meeks Np     Procedure Information: Please note that the numeric value listed in the Medication (1) and associated J-code units and Medication (2) and associated J-code units variables are j-code amounts and do not represent either the concentration or the total amount of the medications injected.  I strongly recommend selecting no to the Render J-code information in note question. This will allow your note to be more clear. If you are billing j-codes with your injection codes you need to document the total amount of the medication injected. This amount should match the j-code units. For example, if you are injecting Triamcinolone 40mg as an intramuscular injection you would select 40 for the dose field and mg for the units. This would allow you to document  with 4 units (40mg = 10mg x 4). The total volume is not used to calculate j-codes only the amount of the medication administered.

## 2021-04-05 ENCOUNTER — PATIENT MESSAGE (OUTPATIENT)
Dept: ADMINISTRATIVE | Facility: HOSPITAL | Age: 56
End: 2021-04-05

## 2021-05-19 ENCOUNTER — HOSPITAL ENCOUNTER (INPATIENT)
Facility: HOSPITAL | Age: 56
LOS: 1 days | Discharge: HOME HEALTH | DRG: 638 | End: 2021-05-22
Attending: EMERGENCY MEDICINE | Admitting: INTERNAL MEDICINE
Payer: MEDICARE

## 2021-05-19 DIAGNOSIS — R06.02 SHORTNESS OF BREATH: ICD-10-CM

## 2021-05-19 DIAGNOSIS — E11.22 TYPE 2 DIABETES MELLITUS WITH CHRONIC KIDNEY DISEASE ON CHRONIC DIALYSIS, WITH LONG-TERM CURRENT USE OF INSULIN: ICD-10-CM

## 2021-05-19 DIAGNOSIS — R00.1 BRADYCARDIA: ICD-10-CM

## 2021-05-19 DIAGNOSIS — Z79.4 TYPE 2 DIABETES MELLITUS WITH CHRONIC KIDNEY DISEASE ON CHRONIC DIALYSIS, WITH LONG-TERM CURRENT USE OF INSULIN: ICD-10-CM

## 2021-05-19 DIAGNOSIS — Z99.2 TYPE 2 DIABETES MELLITUS WITH CHRONIC KIDNEY DISEASE ON CHRONIC DIALYSIS, WITH LONG-TERM CURRENT USE OF INSULIN: ICD-10-CM

## 2021-05-19 DIAGNOSIS — E16.2 HYPOGLYCEMIA: Primary | ICD-10-CM

## 2021-05-19 DIAGNOSIS — N18.6 TYPE 2 DIABETES MELLITUS WITH CHRONIC KIDNEY DISEASE ON CHRONIC DIALYSIS, WITH LONG-TERM CURRENT USE OF INSULIN: ICD-10-CM

## 2021-05-19 DIAGNOSIS — N19 UREMIA: ICD-10-CM

## 2021-05-19 DIAGNOSIS — E11.65 TYPE 2 DIABETES MELLITUS WITH HYPERGLYCEMIA, WITHOUT LONG-TERM CURRENT USE OF INSULIN: ICD-10-CM

## 2021-05-19 PROBLEM — E11.9 TYPE 2 DIABETES MELLITUS: Status: ACTIVE | Noted: 2020-06-25

## 2021-05-19 PROBLEM — E87.20 METABOLIC ACIDOSIS: Status: ACTIVE | Noted: 2021-05-19

## 2021-05-19 PROBLEM — R19.7 DIARRHEA: Status: ACTIVE | Noted: 2021-05-19

## 2021-05-19 LAB
ALBUMIN SERPL BCP-MCNC: 4 G/DL (ref 3.5–5.2)
ALLENS TEST: ABNORMAL
ALP SERPL-CCNC: 34 U/L (ref 55–135)
ALT SERPL W/O P-5'-P-CCNC: 17 U/L (ref 10–44)
ANION GAP SERPL CALC-SCNC: 20 MMOL/L (ref 8–16)
APTT PPP: 29.3 SEC (ref 25.6–35.8)
AST SERPL-CCNC: 25 U/L (ref 10–40)
B-OH-BUTYR BLD STRIP-SCNC: 0.1 MMOL/L (ref 0–0.5)
BASOPHILS # BLD AUTO: 0.01 K/UL (ref 0–0.2)
BASOPHILS NFR BLD: 0.1 % (ref 0–1.9)
BILIRUB SERPL-MCNC: 0.8 MG/DL (ref 0.1–1)
BNP SERPL-MCNC: 372 PG/ML (ref 0–99)
BUN SERPL-MCNC: 94 MG/DL (ref 6–20)
C DIFF GDH STL QL: NEGATIVE
C DIFF TOX A+B STL QL IA: NEGATIVE
CALCIUM SERPL-MCNC: 8.1 MG/DL (ref 8.7–10.5)
CHLORIDE SERPL-SCNC: 100 MMOL/L (ref 95–110)
CO2 SERPL-SCNC: 19 MMOL/L (ref 23–29)
CREAT SERPL-MCNC: 8.5 MG/DL (ref 0.5–1.4)
DELSYS: ABNORMAL
DIFFERENTIAL METHOD: ABNORMAL
EOSINOPHIL # BLD AUTO: 0 K/UL (ref 0–0.5)
EOSINOPHIL NFR BLD: 0.4 % (ref 0–8)
ERYTHROCYTE [DISTWIDTH] IN BLOOD BY AUTOMATED COUNT: 14.2 % (ref 11.5–14.5)
EST. GFR  (AFRICAN AMERICAN): 5.5 ML/MIN/1.73 M^2
EST. GFR  (NON AFRICAN AMERICAN): 4.8 ML/MIN/1.73 M^2
GLUCOSE SERPL-MCNC: 102 MG/DL (ref 70–110)
GLUCOSE SERPL-MCNC: 125 MG/DL (ref 70–110)
GLUCOSE SERPL-MCNC: 128 MG/DL (ref 70–110)
GLUCOSE SERPL-MCNC: 146 MG/DL (ref 70–110)
GLUCOSE SERPL-MCNC: 38 MG/DL (ref 70–110)
GLUCOSE SERPL-MCNC: 41 MG/DL (ref 70–110)
GLUCOSE SERPL-MCNC: 66 MG/DL (ref 70–110)
GLUCOSE SERPL-MCNC: 72 MG/DL (ref 70–110)
GLUCOSE SERPL-MCNC: 77 MG/DL (ref 70–110)
GLUCOSE SERPL-MCNC: 78 MG/DL (ref 70–110)
GLUCOSE SERPL-MCNC: 88 MG/DL (ref 70–110)
GLUCOSE SERPL-MCNC: 95 MG/DL (ref 70–110)
HCO3 UR-SCNC: 22.1 MMOL/L (ref 24–28)
HCT VFR BLD AUTO: 35.5 % (ref 37–48.5)
HGB BLD-MCNC: 11.6 G/DL (ref 12–16)
IMM GRANULOCYTES # BLD AUTO: 0.02 K/UL (ref 0–0.04)
IMM GRANULOCYTES NFR BLD AUTO: 0.3 % (ref 0–0.5)
INR PPP: 1.3
LACTATE SERPL-SCNC: 1 MMOL/L (ref 0.5–1.9)
LYMPHOCYTES # BLD AUTO: 0.7 K/UL (ref 1–4.8)
LYMPHOCYTES NFR BLD: 8.8 % (ref 18–48)
MAGNESIUM SERPL-MCNC: 2 MG/DL (ref 1.6–2.6)
MCH RBC QN AUTO: 29.7 PG (ref 27–31)
MCHC RBC AUTO-ENTMCNC: 32.7 G/DL (ref 32–36)
MCV RBC AUTO: 91 FL (ref 82–98)
MONOCYTES # BLD AUTO: 0.8 K/UL (ref 0.3–1)
MONOCYTES NFR BLD: 10 % (ref 4–15)
NEUTROPHILS # BLD AUTO: 6.3 K/UL (ref 1.8–7.7)
NEUTROPHILS NFR BLD: 80.4 % (ref 38–73)
NRBC BLD-RTO: 0 /100 WBC
PCO2 BLDA: 47.8 MMHG (ref 35–45)
PH SMN: 7.27 [PH] (ref 7.35–7.45)
PHOSPHATE SERPL-MCNC: 7.8 MG/DL (ref 2.7–4.5)
PLATELET # BLD AUTO: 241 K/UL (ref 150–450)
PMV BLD AUTO: 11.1 FL (ref 9.2–12.9)
PO2 BLDA: 22 MMHG (ref 40–60)
POC BE: -5 MMOL/L
POC SATURATED O2: 30 % (ref 95–100)
POC TCO2: 24 MMOL/L (ref 24–29)
POTASSIUM SERPL-SCNC: 3.7 MMOL/L (ref 3.5–5.1)
PROT SERPL-MCNC: 7.6 G/DL (ref 6–8.4)
PROTHROMBIN TIME: 16 SEC (ref 11.8–14.3)
RBC # BLD AUTO: 3.91 M/UL (ref 4–5.4)
SAMPLE: ABNORMAL
SARS-COV-2 RDRP RESP QL NAA+PROBE: NEGATIVE
SITE: ABNORMAL
SODIUM SERPL-SCNC: 139 MMOL/L (ref 136–145)
TROPONIN I SERPL DL<=0.01 NG/ML-MCNC: <0.03 NG/ML
WBC # BLD AUTO: 7.8 K/UL (ref 3.9–12.7)

## 2021-05-19 PROCEDURE — 87040 BLOOD CULTURE FOR BACTERIA: CPT | Mod: 59 | Performed by: STUDENT IN AN ORGANIZED HEALTH CARE EDUCATION/TRAINING PROGRAM

## 2021-05-19 PROCEDURE — 96374 THER/PROPH/DIAG INJ IV PUSH: CPT

## 2021-05-19 PROCEDURE — 80053 COMPREHEN METABOLIC PANEL: CPT | Performed by: STUDENT IN AN ORGANIZED HEALTH CARE EDUCATION/TRAINING PROGRAM

## 2021-05-19 PROCEDURE — 93005 ELECTROCARDIOGRAM TRACING: CPT | Performed by: INTERNAL MEDICINE

## 2021-05-19 PROCEDURE — 80074 ACUTE HEPATITIS PANEL: CPT | Performed by: FAMILY MEDICINE

## 2021-05-19 PROCEDURE — 83880 ASSAY OF NATRIURETIC PEPTIDE: CPT | Performed by: STUDENT IN AN ORGANIZED HEALTH CARE EDUCATION/TRAINING PROGRAM

## 2021-05-19 PROCEDURE — 93010 EKG 12-LEAD: ICD-10-PCS | Mod: ,,, | Performed by: INTERNAL MEDICINE

## 2021-05-19 PROCEDURE — 82962 GLUCOSE BLOOD TEST: CPT

## 2021-05-19 PROCEDURE — 99285 EMERGENCY DEPT VISIT HI MDM: CPT | Mod: 25

## 2021-05-19 PROCEDURE — 25000003 PHARM REV CODE 250: Performed by: INTERNAL MEDICINE

## 2021-05-19 PROCEDURE — 96374 THER/PROPH/DIAG INJ IV PUSH: CPT | Mod: 59

## 2021-05-19 PROCEDURE — 87324 CLOSTRIDIUM AG IA: CPT | Performed by: INTERNAL MEDICINE

## 2021-05-19 PROCEDURE — 84100 ASSAY OF PHOSPHORUS: CPT | Performed by: STUDENT IN AN ORGANIZED HEALTH CARE EDUCATION/TRAINING PROGRAM

## 2021-05-19 PROCEDURE — 82803 BLOOD GASES ANY COMBINATION: CPT

## 2021-05-19 PROCEDURE — 84484 ASSAY OF TROPONIN QUANT: CPT | Performed by: STUDENT IN AN ORGANIZED HEALTH CARE EDUCATION/TRAINING PROGRAM

## 2021-05-19 PROCEDURE — 96376 TX/PRO/DX INJ SAME DRUG ADON: CPT

## 2021-05-19 PROCEDURE — 96375 TX/PRO/DX INJ NEW DRUG ADDON: CPT

## 2021-05-19 PROCEDURE — 63600175 PHARM REV CODE 636 W HCPCS: Performed by: STUDENT IN AN ORGANIZED HEALTH CARE EDUCATION/TRAINING PROGRAM

## 2021-05-19 PROCEDURE — 99900035 HC TECH TIME PER 15 MIN (STAT)

## 2021-05-19 PROCEDURE — 96372 THER/PROPH/DIAG INJ SC/IM: CPT

## 2021-05-19 PROCEDURE — 99900031 HC PATIENT EDUCATION (STAT)

## 2021-05-19 PROCEDURE — U0002 COVID-19 LAB TEST NON-CDC: HCPCS | Performed by: STUDENT IN AN ORGANIZED HEALTH CARE EDUCATION/TRAINING PROGRAM

## 2021-05-19 PROCEDURE — 85610 PROTHROMBIN TIME: CPT | Performed by: STUDENT IN AN ORGANIZED HEALTH CARE EDUCATION/TRAINING PROGRAM

## 2021-05-19 PROCEDURE — 36415 COLL VENOUS BLD VENIPUNCTURE: CPT | Performed by: STUDENT IN AN ORGANIZED HEALTH CARE EDUCATION/TRAINING PROGRAM

## 2021-05-19 PROCEDURE — 83605 ASSAY OF LACTIC ACID: CPT | Performed by: STUDENT IN AN ORGANIZED HEALTH CARE EDUCATION/TRAINING PROGRAM

## 2021-05-19 PROCEDURE — 25000003 PHARM REV CODE 250: Performed by: STUDENT IN AN ORGANIZED HEALTH CARE EDUCATION/TRAINING PROGRAM

## 2021-05-19 PROCEDURE — 63600175 PHARM REV CODE 636 W HCPCS: Mod: JG | Performed by: INTERNAL MEDICINE

## 2021-05-19 PROCEDURE — G0378 HOSPITAL OBSERVATION PER HR: HCPCS

## 2021-05-19 PROCEDURE — 63600175 PHARM REV CODE 636 W HCPCS: Performed by: INTERNAL MEDICINE

## 2021-05-19 PROCEDURE — 85025 COMPLETE CBC W/AUTO DIFF WBC: CPT | Performed by: STUDENT IN AN ORGANIZED HEALTH CARE EDUCATION/TRAINING PROGRAM

## 2021-05-19 PROCEDURE — 90935 HEMODIALYSIS ONE EVALUATION: CPT

## 2021-05-19 PROCEDURE — 85730 THROMBOPLASTIN TIME PARTIAL: CPT | Performed by: STUDENT IN AN ORGANIZED HEALTH CARE EDUCATION/TRAINING PROGRAM

## 2021-05-19 PROCEDURE — 83735 ASSAY OF MAGNESIUM: CPT | Performed by: STUDENT IN AN ORGANIZED HEALTH CARE EDUCATION/TRAINING PROGRAM

## 2021-05-19 PROCEDURE — 87449 NOS EACH ORGANISM AG IA: CPT | Performed by: INTERNAL MEDICINE

## 2021-05-19 PROCEDURE — 25000003 PHARM REV CODE 250

## 2021-05-19 PROCEDURE — 82010 KETONE BODYS QUAN: CPT | Performed by: STUDENT IN AN ORGANIZED HEALTH CARE EDUCATION/TRAINING PROGRAM

## 2021-05-19 PROCEDURE — 25000003 PHARM REV CODE 250: Performed by: FAMILY MEDICINE

## 2021-05-19 PROCEDURE — 93010 ELECTROCARDIOGRAM REPORT: CPT | Mod: ,,, | Performed by: INTERNAL MEDICINE

## 2021-05-19 RX ORDER — PANTOPRAZOLE SODIUM 40 MG/1
40 TABLET, DELAYED RELEASE ORAL DAILY
Status: DISCONTINUED | OUTPATIENT
Start: 2021-05-19 | End: 2021-05-22 | Stop reason: HOSPADM

## 2021-05-19 RX ORDER — GABAPENTIN 300 MG/1
300 CAPSULE ORAL NIGHTLY
Status: DISCONTINUED | OUTPATIENT
Start: 2021-05-19 | End: 2021-05-19

## 2021-05-19 RX ORDER — ONDANSETRON 2 MG/ML
4 INJECTION INTRAMUSCULAR; INTRAVENOUS EVERY 8 HOURS PRN
Status: DISCONTINUED | OUTPATIENT
Start: 2021-05-19 | End: 2021-05-22 | Stop reason: HOSPADM

## 2021-05-19 RX ORDER — DEXTROSE 50 % IN WATER (D50W) INTRAVENOUS SYRINGE
25
Status: COMPLETED | OUTPATIENT
Start: 2021-05-19 | End: 2021-05-19

## 2021-05-19 RX ORDER — GLUCAGON 1 MG
1 KIT INJECTION ONCE
Status: COMPLETED | OUTPATIENT
Start: 2021-05-19 | End: 2021-05-19

## 2021-05-19 RX ORDER — ESCITALOPRAM OXALATE 20 MG/1
20 TABLET ORAL DAILY
COMMUNITY
End: 2021-05-28 | Stop reason: SDUPTHER

## 2021-05-19 RX ORDER — ATORVASTATIN CALCIUM 40 MG/1
80 TABLET, FILM COATED ORAL DAILY
Status: DISCONTINUED | OUTPATIENT
Start: 2021-05-19 | End: 2021-05-22 | Stop reason: HOSPADM

## 2021-05-19 RX ORDER — DOXERCALCIFEROL 4 UG/2ML
4 INJECTION INTRAVENOUS
COMMUNITY
Start: 2021-01-27 | End: 2022-01-26

## 2021-05-19 RX ORDER — EZETIMIBE 10 MG/1
10 TABLET ORAL DAILY
Status: DISCONTINUED | OUTPATIENT
Start: 2021-05-19 | End: 2021-05-19

## 2021-05-19 RX ORDER — ESCITALOPRAM OXALATE 10 MG/1
20 TABLET ORAL DAILY
Status: DISCONTINUED | OUTPATIENT
Start: 2021-05-19 | End: 2021-05-19

## 2021-05-19 RX ORDER — FENOFIBRATE 160 MG/1
160 TABLET ORAL DAILY
Status: DISCONTINUED | OUTPATIENT
Start: 2021-05-19 | End: 2021-05-19

## 2021-05-19 RX ORDER — AMLODIPINE BESYLATE 5 MG/1
10 TABLET ORAL DAILY
Status: DISCONTINUED | OUTPATIENT
Start: 2021-05-19 | End: 2021-05-22 | Stop reason: HOSPADM

## 2021-05-19 RX ORDER — ASPIRIN 81 MG/1
81 TABLET ORAL DAILY
Status: DISCONTINUED | OUTPATIENT
Start: 2021-05-19 | End: 2021-05-22 | Stop reason: HOSPADM

## 2021-05-19 RX ORDER — LOSARTAN POTASSIUM 25 MG/1
25 TABLET ORAL DAILY
COMMUNITY
Start: 2021-04-21 | End: 2022-05-04

## 2021-05-19 RX ORDER — DEXTROSE MONOHYDRATE 100 MG/ML
INJECTION, SOLUTION INTRAVENOUS
Status: COMPLETED | OUTPATIENT
Start: 2021-05-19 | End: 2021-05-19

## 2021-05-19 RX ORDER — PROCHLORPERAZINE EDISYLATE 5 MG/ML
5 INJECTION INTRAMUSCULAR; INTRAVENOUS EVERY 6 HOURS PRN
Status: DISCONTINUED | OUTPATIENT
Start: 2021-05-19 | End: 2021-05-19

## 2021-05-19 RX ORDER — ONDANSETRON 2 MG/ML
4 INJECTION INTRAMUSCULAR; INTRAVENOUS
Status: COMPLETED | OUTPATIENT
Start: 2021-05-19 | End: 2021-05-19

## 2021-05-19 RX ORDER — INSULIN ASPART 100 [IU]/ML
INJECTION, SUSPENSION SUBCUTANEOUS
Status: ON HOLD | COMMUNITY
Start: 2020-07-10 | End: 2021-05-22 | Stop reason: HOSPADM

## 2021-05-19 RX ORDER — ACETAMINOPHEN 325 MG/1
650 TABLET ORAL EVERY 8 HOURS PRN
Status: DISCONTINUED | OUTPATIENT
Start: 2021-05-19 | End: 2021-05-22 | Stop reason: HOSPADM

## 2021-05-19 RX ORDER — ACETAMINOPHEN 325 MG/1
325 TABLET ORAL EVERY 6 HOURS PRN
Status: DISCONTINUED | OUTPATIENT
Start: 2021-05-19 | End: 2021-05-22 | Stop reason: HOSPADM

## 2021-05-19 RX ORDER — SODIUM CHLORIDE 0.9 % (FLUSH) 0.9 %
10 SYRINGE (ML) INJECTION
Status: DISCONTINUED | OUTPATIENT
Start: 2021-05-19 | End: 2021-05-22 | Stop reason: HOSPADM

## 2021-05-19 RX ORDER — DEXTROSE MONOHYDRATE 100 MG/ML
INJECTION, SOLUTION INTRAVENOUS CONTINUOUS
Status: DISCONTINUED | OUTPATIENT
Start: 2021-05-19 | End: 2021-05-20

## 2021-05-19 RX ORDER — LOSARTAN POTASSIUM 25 MG/1
25 TABLET ORAL DAILY
Status: DISCONTINUED | OUTPATIENT
Start: 2021-05-19 | End: 2021-05-22 | Stop reason: HOSPADM

## 2021-05-19 RX ORDER — ENOXAPARIN SODIUM 100 MG/ML
30 INJECTION SUBCUTANEOUS
Status: DISCONTINUED | OUTPATIENT
Start: 2021-05-19 | End: 2021-05-22 | Stop reason: HOSPADM

## 2021-05-19 RX ORDER — METOPROLOL TARTRATE 25 MG/1
25 TABLET, FILM COATED ORAL 2 TIMES DAILY
Status: DISCONTINUED | OUTPATIENT
Start: 2021-05-19 | End: 2021-05-19

## 2021-05-19 RX ORDER — PANTOPRAZOLE SODIUM 40 MG/1
40 TABLET, DELAYED RELEASE ORAL DAILY
Status: DISCONTINUED | OUTPATIENT
Start: 2021-05-19 | End: 2021-05-19

## 2021-05-19 RX ADMIN — ACETAMINOPHEN 650 MG: 325 TABLET, FILM COATED ORAL at 04:05

## 2021-05-19 RX ADMIN — ONDANSETRON 4 MG: 2 INJECTION INTRAMUSCULAR; INTRAVENOUS at 02:05

## 2021-05-19 RX ADMIN — PANTOPRAZOLE SODIUM 40 MG: 40 TABLET, DELAYED RELEASE ORAL at 08:05

## 2021-05-19 RX ADMIN — GLUCAGON 1 MG: KIT at 06:05

## 2021-05-19 RX ADMIN — DEXTROSE: 10 SOLUTION INTRAVENOUS at 03:05

## 2021-05-19 RX ADMIN — DEXTROSE 50 % IN WATER (D50W) INTRAVENOUS SYRINGE 25 G: at 02:05

## 2021-05-19 RX ADMIN — DEXTROSE: 10 SOLUTION INTRAVENOUS at 10:05

## 2021-05-19 RX ADMIN — DEXTROSE MONOHYDRATE 25 G: 500 INJECTION PARENTERAL at 05:05

## 2021-05-19 RX ADMIN — ENOXAPARIN SODIUM 30 MG: 30 INJECTION SUBCUTANEOUS at 04:05

## 2021-05-19 RX ADMIN — DICYCLOMINE HYDROCHLORIDE 50 ML: 10 SOLUTION ORAL at 07:05

## 2021-05-19 RX ADMIN — ONDANSETRON 4 MG: 2 INJECTION INTRAMUSCULAR; INTRAVENOUS at 05:05

## 2021-05-19 RX ADMIN — DEXTROSE MONOHYDRATE 25 G: 500 INJECTION PARENTERAL at 02:05

## 2021-05-20 LAB
ANION GAP SERPL CALC-SCNC: 13 MMOL/L (ref 8–16)
BASOPHILS # BLD AUTO: 0.03 K/UL (ref 0–0.2)
BASOPHILS NFR BLD: 0.5 % (ref 0–1.9)
BUN SERPL-MCNC: 57 MG/DL (ref 6–20)
CALCIUM SERPL-MCNC: 7.4 MG/DL (ref 8.7–10.5)
CHLORIDE SERPL-SCNC: 102 MMOL/L (ref 95–110)
CO2 SERPL-SCNC: 21 MMOL/L (ref 23–29)
CREAT SERPL-MCNC: 6.4 MG/DL (ref 0.5–1.4)
DIFFERENTIAL METHOD: ABNORMAL
EOSINOPHIL # BLD AUTO: 0.1 K/UL (ref 0–0.5)
EOSINOPHIL NFR BLD: 2.2 % (ref 0–8)
ERYTHROCYTE [DISTWIDTH] IN BLOOD BY AUTOMATED COUNT: 14.4 % (ref 11.5–14.5)
EST. GFR  (AFRICAN AMERICAN): 7.8 ML/MIN/1.73 M^2
EST. GFR  (NON AFRICAN AMERICAN): 6.7 ML/MIN/1.73 M^2
ESTIMATED AVG GLUCOSE: 223 MG/DL (ref 68–131)
GLUCOSE SERPL-MCNC: 100 MG/DL (ref 70–110)
GLUCOSE SERPL-MCNC: 102 MG/DL (ref 70–110)
GLUCOSE SERPL-MCNC: 106 MG/DL (ref 70–110)
GLUCOSE SERPL-MCNC: 138 MG/DL (ref 70–110)
GLUCOSE SERPL-MCNC: 146 MG/DL (ref 70–110)
GLUCOSE SERPL-MCNC: 158 MG/DL (ref 70–110)
HAV IGM SERPL QL IA: NEGATIVE
HBA1C MFR BLD: 9.4 % (ref 4.5–6.2)
HBV CORE IGM SERPL QL IA: NEGATIVE
HBV SURFACE AG SERPL QL IA: NEGATIVE
HCT VFR BLD AUTO: 32.1 % (ref 37–48.5)
HCV AB S/CO SERPL IA: <0.1 S/CO RATIO (ref 0–0.9)
HGB BLD-MCNC: 10.4 G/DL (ref 12–16)
IMM GRANULOCYTES # BLD AUTO: 0.01 K/UL (ref 0–0.04)
IMM GRANULOCYTES NFR BLD AUTO: 0.2 % (ref 0–0.5)
LYMPHOCYTES # BLD AUTO: 1.6 K/UL (ref 1–4.8)
LYMPHOCYTES NFR BLD: 26 % (ref 18–48)
MAGNESIUM SERPL-MCNC: 1.7 MG/DL (ref 1.6–2.6)
MCH RBC QN AUTO: 29.5 PG (ref 27–31)
MCHC RBC AUTO-ENTMCNC: 32.4 G/DL (ref 32–36)
MCV RBC AUTO: 91 FL (ref 82–98)
MONOCYTES # BLD AUTO: 0.8 K/UL (ref 0.3–1)
MONOCYTES NFR BLD: 12.9 % (ref 4–15)
NEUTROPHILS # BLD AUTO: 3.5 K/UL (ref 1.8–7.7)
NEUTROPHILS NFR BLD: 58.2 % (ref 38–73)
NRBC BLD-RTO: 0 /100 WBC
PLATELET # BLD AUTO: 210 K/UL (ref 150–450)
PMV BLD AUTO: 11.3 FL (ref 9.2–12.9)
POTASSIUM SERPL-SCNC: 3.7 MMOL/L (ref 3.5–5.1)
RBC # BLD AUTO: 3.52 M/UL (ref 4–5.4)
SODIUM SERPL-SCNC: 136 MMOL/L (ref 136–145)
WBC # BLD AUTO: 5.96 K/UL (ref 3.9–12.7)

## 2021-05-20 PROCEDURE — 83036 HEMOGLOBIN GLYCOSYLATED A1C: CPT | Performed by: INTERNAL MEDICINE

## 2021-05-20 PROCEDURE — 92611 MOTION FLUOROSCOPY/SWALLOW: CPT

## 2021-05-20 PROCEDURE — 83735 ASSAY OF MAGNESIUM: CPT | Performed by: INTERNAL MEDICINE

## 2021-05-20 PROCEDURE — 92526 ORAL FUNCTION THERAPY: CPT

## 2021-05-20 PROCEDURE — 63600175 PHARM REV CODE 636 W HCPCS: Performed by: INTERNAL MEDICINE

## 2021-05-20 PROCEDURE — 96375 TX/PRO/DX INJ NEW DRUG ADDON: CPT

## 2021-05-20 PROCEDURE — 25000003 PHARM REV CODE 250: Performed by: INTERNAL MEDICINE

## 2021-05-20 PROCEDURE — G0378 HOSPITAL OBSERVATION PER HR: HCPCS

## 2021-05-20 PROCEDURE — 85025 COMPLETE CBC W/AUTO DIFF WBC: CPT | Performed by: INTERNAL MEDICINE

## 2021-05-20 PROCEDURE — 92610 EVALUATE SWALLOWING FUNCTION: CPT

## 2021-05-20 PROCEDURE — 80048 BASIC METABOLIC PNL TOTAL CA: CPT | Performed by: INTERNAL MEDICINE

## 2021-05-20 PROCEDURE — 25000003 PHARM REV CODE 250: Performed by: NURSE PRACTITIONER

## 2021-05-20 PROCEDURE — 96372 THER/PROPH/DIAG INJ SC/IM: CPT | Mod: 59

## 2021-05-20 PROCEDURE — 36415 COLL VENOUS BLD VENIPUNCTURE: CPT | Performed by: INTERNAL MEDICINE

## 2021-05-20 RX ORDER — DIPHENOXYLATE HYDROCHLORIDE AND ATROPINE SULFATE 2.5; .025 MG/1; MG/1
1 TABLET ORAL 4 TIMES DAILY PRN
Status: DISCONTINUED | OUTPATIENT
Start: 2021-05-20 | End: 2021-05-22 | Stop reason: HOSPADM

## 2021-05-20 RX ORDER — LOPERAMIDE HYDROCHLORIDE 2 MG/1
2 CAPSULE ORAL 4 TIMES DAILY PRN
Status: DISCONTINUED | OUTPATIENT
Start: 2021-05-20 | End: 2021-05-20

## 2021-05-20 RX ORDER — SEVELAMER CARBONATE 800 MG/1
800 TABLET, FILM COATED ORAL
Status: DISCONTINUED | OUTPATIENT
Start: 2021-05-20 | End: 2021-05-21

## 2021-05-20 RX ORDER — BENZONATATE 100 MG/1
100 CAPSULE ORAL 3 TIMES DAILY PRN
Status: DISCONTINUED | OUTPATIENT
Start: 2021-05-20 | End: 2021-05-22 | Stop reason: HOSPADM

## 2021-05-20 RX ORDER — GUAIFENESIN/DEXTROMETHORPHAN 100-10MG/5
5 SYRUP ORAL EVERY 4 HOURS PRN
Status: DISCONTINUED | OUTPATIENT
Start: 2021-05-20 | End: 2021-05-22 | Stop reason: HOSPADM

## 2021-05-20 RX ORDER — MAGNESIUM SULFATE HEPTAHYDRATE 40 MG/ML
2 INJECTION, SOLUTION INTRAVENOUS ONCE
Status: COMPLETED | OUTPATIENT
Start: 2021-05-20 | End: 2021-05-20

## 2021-05-20 RX ADMIN — GUAIFENESIN AND DEXTROMETHORPHAN 5 ML: 100; 10 SYRUP ORAL at 04:05

## 2021-05-20 RX ADMIN — ACETAMINOPHEN 650 MG: 325 TABLET, FILM COATED ORAL at 07:05

## 2021-05-20 RX ADMIN — GUAIFENESIN AND DEXTROMETHORPHAN 5 ML: 100; 10 SYRUP ORAL at 05:05

## 2021-05-20 RX ADMIN — ATORVASTATIN CALCIUM 80 MG: 40 TABLET, FILM COATED ORAL at 09:05

## 2021-05-20 RX ADMIN — PANTOPRAZOLE SODIUM 40 MG: 40 TABLET, DELAYED RELEASE ORAL at 09:05

## 2021-05-20 RX ADMIN — DIPHENOXYLATE HYDROCHLORIDE AND ATROPINE SULFATE 1 TABLET: 2.5; .025 TABLET ORAL at 09:05

## 2021-05-20 RX ADMIN — ENOXAPARIN SODIUM 30 MG: 30 INJECTION SUBCUTANEOUS at 05:05

## 2021-05-20 RX ADMIN — LOSARTAN POTASSIUM 25 MG: 25 TABLET, FILM COATED ORAL at 09:05

## 2021-05-20 RX ADMIN — SEVELAMER CARBONATE 800 MG: 800 TABLET, FILM COATED ORAL at 12:05

## 2021-05-20 RX ADMIN — BENZONATATE 100 MG: 100 CAPSULE ORAL at 04:05

## 2021-05-20 RX ADMIN — LOPERAMIDE HYDROCHLORIDE 2 MG: 2 CAPSULE ORAL at 04:05

## 2021-05-20 RX ADMIN — ACETAMINOPHEN 650 MG: 325 TABLET, FILM COATED ORAL at 12:05

## 2021-05-20 RX ADMIN — BENZONATATE 100 MG: 100 CAPSULE ORAL at 05:05

## 2021-05-20 RX ADMIN — ASPIRIN 81 MG: 81 TABLET, DELAYED RELEASE ORAL at 09:05

## 2021-05-20 RX ADMIN — AMLODIPINE BESYLATE 10 MG: 5 TABLET ORAL at 09:05

## 2021-05-20 RX ADMIN — MAGNESIUM SULFATE 2 G: 2 INJECTION INTRAVENOUS at 12:05

## 2021-05-21 PROBLEM — E83.51 HYPOCALCEMIA: Status: ACTIVE | Noted: 2021-05-21

## 2021-05-21 LAB
ANION GAP SERPL CALC-SCNC: 14 MMOL/L (ref 8–16)
BASOPHILS # BLD AUTO: 0.03 K/UL (ref 0–0.2)
BASOPHILS NFR BLD: 0.4 % (ref 0–1.9)
BUN SERPL-MCNC: 73 MG/DL (ref 6–20)
CALCIUM SERPL-MCNC: 6.7 MG/DL (ref 8.7–10.5)
CHLORIDE SERPL-SCNC: 100 MMOL/L (ref 95–110)
CO2 SERPL-SCNC: 20 MMOL/L (ref 23–29)
CREAT SERPL-MCNC: 8.7 MG/DL (ref 0.5–1.4)
DIFFERENTIAL METHOD: ABNORMAL
EOSINOPHIL # BLD AUTO: 0.2 K/UL (ref 0–0.5)
EOSINOPHIL NFR BLD: 3.2 % (ref 0–8)
ERYTHROCYTE [DISTWIDTH] IN BLOOD BY AUTOMATED COUNT: 14.2 % (ref 11.5–14.5)
EST. GFR  (AFRICAN AMERICAN): 5.4 ML/MIN/1.73 M^2
EST. GFR  (NON AFRICAN AMERICAN): 4.6 ML/MIN/1.73 M^2
GLUCOSE SERPL-MCNC: 112 MG/DL (ref 70–110)
GLUCOSE SERPL-MCNC: 175 MG/DL (ref 70–110)
GLUCOSE SERPL-MCNC: 228 MG/DL (ref 70–110)
GLUCOSE SERPL-MCNC: 86 MG/DL (ref 70–110)
HCT VFR BLD AUTO: 30 % (ref 37–48.5)
HGB BLD-MCNC: 9.8 G/DL (ref 12–16)
IMM GRANULOCYTES # BLD AUTO: 0.02 K/UL (ref 0–0.04)
IMM GRANULOCYTES NFR BLD AUTO: 0.3 % (ref 0–0.5)
LYMPHOCYTES # BLD AUTO: 2 K/UL (ref 1–4.8)
LYMPHOCYTES NFR BLD: 26.9 % (ref 18–48)
MAGNESIUM SERPL-MCNC: 1.9 MG/DL (ref 1.6–2.6)
MCH RBC QN AUTO: 29.7 PG (ref 27–31)
MCHC RBC AUTO-ENTMCNC: 32.7 G/DL (ref 32–36)
MCV RBC AUTO: 91 FL (ref 82–98)
MONOCYTES # BLD AUTO: 0.9 K/UL (ref 0.3–1)
MONOCYTES NFR BLD: 11.9 % (ref 4–15)
NEUTROPHILS # BLD AUTO: 4.2 K/UL (ref 1.8–7.7)
NEUTROPHILS NFR BLD: 57.3 % (ref 38–73)
NRBC BLD-RTO: 0 /100 WBC
PHOSPHATE SERPL-MCNC: 6.4 MG/DL (ref 2.7–4.5)
PLATELET # BLD AUTO: 212 K/UL (ref 150–450)
PMV BLD AUTO: 11 FL (ref 9.2–12.9)
POTASSIUM SERPL-SCNC: 3.8 MMOL/L (ref 3.5–5.1)
RBC # BLD AUTO: 3.3 M/UL (ref 4–5.4)
SODIUM SERPL-SCNC: 134 MMOL/L (ref 136–145)
WBC # BLD AUTO: 7.29 K/UL (ref 3.9–12.7)

## 2021-05-21 PROCEDURE — 84100 ASSAY OF PHOSPHORUS: CPT | Performed by: INTERNAL MEDICINE

## 2021-05-21 PROCEDURE — 90935 HEMODIALYSIS ONE EVALUATION: CPT

## 2021-05-21 PROCEDURE — 63600175 PHARM REV CODE 636 W HCPCS: Performed by: INTERNAL MEDICINE

## 2021-05-21 PROCEDURE — 92526 ORAL FUNCTION THERAPY: CPT

## 2021-05-21 PROCEDURE — 85025 COMPLETE CBC W/AUTO DIFF WBC: CPT | Performed by: INTERNAL MEDICINE

## 2021-05-21 PROCEDURE — 96375 TX/PRO/DX INJ NEW DRUG ADDON: CPT

## 2021-05-21 PROCEDURE — 12000002 HC ACUTE/MED SURGE SEMI-PRIVATE ROOM

## 2021-05-21 PROCEDURE — 80048 BASIC METABOLIC PNL TOTAL CA: CPT | Performed by: INTERNAL MEDICINE

## 2021-05-21 PROCEDURE — 36415 COLL VENOUS BLD VENIPUNCTURE: CPT | Performed by: INTERNAL MEDICINE

## 2021-05-21 PROCEDURE — 25000003 PHARM REV CODE 250: Performed by: NURSE PRACTITIONER

## 2021-05-21 PROCEDURE — 83735 ASSAY OF MAGNESIUM: CPT | Performed by: INTERNAL MEDICINE

## 2021-05-21 PROCEDURE — 63600175 PHARM REV CODE 636 W HCPCS: Mod: TB | Performed by: INTERNAL MEDICINE

## 2021-05-21 PROCEDURE — 25000003 PHARM REV CODE 250: Performed by: INTERNAL MEDICINE

## 2021-05-21 RX ORDER — CALCIUM ACETATE 667 MG/1
667 CAPSULE ORAL
Status: DISCONTINUED | OUTPATIENT
Start: 2021-05-21 | End: 2021-05-22 | Stop reason: HOSPADM

## 2021-05-21 RX ADMIN — CALCIUM ACETATE 667 MG: 667 CAPSULE ORAL at 04:05

## 2021-05-21 RX ADMIN — EPOETIN ALFA-EPBX 4800 UNITS: 10000 INJECTION, SOLUTION INTRAVENOUS; SUBCUTANEOUS at 11:05

## 2021-05-21 RX ADMIN — AMLODIPINE BESYLATE 10 MG: 5 TABLET ORAL at 01:05

## 2021-05-21 RX ADMIN — ASPIRIN 81 MG: 81 TABLET, DELAYED RELEASE ORAL at 01:05

## 2021-05-21 RX ADMIN — LOSARTAN POTASSIUM 25 MG: 25 TABLET, FILM COATED ORAL at 01:05

## 2021-05-21 RX ADMIN — PANTOPRAZOLE SODIUM 40 MG: 40 TABLET, DELAYED RELEASE ORAL at 01:05

## 2021-05-21 RX ADMIN — ATORVASTATIN CALCIUM 80 MG: 40 TABLET, FILM COATED ORAL at 01:05

## 2021-05-21 RX ADMIN — CALCIUM GLUCONATE 1 G: 94 INJECTION, SOLUTION INTRAVENOUS at 07:05

## 2021-05-21 RX ADMIN — ENOXAPARIN SODIUM 30 MG: 30 INJECTION SUBCUTANEOUS at 04:05

## 2021-05-21 RX ADMIN — SEVELAMER CARBONATE 800 MG: 800 TABLET, FILM COATED ORAL at 07:05

## 2021-05-21 RX ADMIN — BENZONATATE 100 MG: 100 CAPSULE ORAL at 01:05

## 2021-05-21 RX ADMIN — CALCIUM ACETATE 667 MG: 667 CAPSULE ORAL at 01:05

## 2021-05-22 VITALS
OXYGEN SATURATION: 99 % | BODY MASS INDEX: 33.05 KG/M2 | TEMPERATURE: 98 F | WEIGHT: 210.56 LBS | SYSTOLIC BLOOD PRESSURE: 169 MMHG | HEART RATE: 66 BPM | HEIGHT: 67 IN | RESPIRATION RATE: 18 BRPM | DIASTOLIC BLOOD PRESSURE: 72 MMHG

## 2021-05-22 PROBLEM — E16.2 HYPOGLYCEMIA: Status: RESOLVED | Noted: 2021-05-19 | Resolved: 2021-05-22

## 2021-05-22 PROBLEM — R19.7 DIARRHEA: Status: RESOLVED | Noted: 2021-05-19 | Resolved: 2021-05-22

## 2021-05-22 PROBLEM — E83.51 HYPOCALCEMIA: Status: RESOLVED | Noted: 2021-05-21 | Resolved: 2021-05-22

## 2021-05-22 LAB
ANION GAP SERPL CALC-SCNC: 15 MMOL/L (ref 8–16)
BASOPHILS # BLD AUTO: 0.03 K/UL (ref 0–0.2)
BASOPHILS NFR BLD: 0.4 % (ref 0–1.9)
BUN SERPL-MCNC: 52 MG/DL (ref 6–20)
CALCIUM SERPL-MCNC: 7.7 MG/DL (ref 8.7–10.5)
CHLORIDE SERPL-SCNC: 99 MMOL/L (ref 95–110)
CO2 SERPL-SCNC: 21 MMOL/L (ref 23–29)
CREAT SERPL-MCNC: 7.3 MG/DL (ref 0.5–1.4)
DIFFERENTIAL METHOD: ABNORMAL
EOSINOPHIL # BLD AUTO: 0.1 K/UL (ref 0–0.5)
EOSINOPHIL NFR BLD: 1.7 % (ref 0–8)
ERYTHROCYTE [DISTWIDTH] IN BLOOD BY AUTOMATED COUNT: 14.1 % (ref 11.5–14.5)
EST. GFR  (AFRICAN AMERICAN): 6.6 ML/MIN/1.73 M^2
EST. GFR  (NON AFRICAN AMERICAN): 5.7 ML/MIN/1.73 M^2
GLUCOSE SERPL-MCNC: 196 MG/DL (ref 70–110)
GLUCOSE SERPL-MCNC: 251 MG/DL (ref 70–110)
GLUCOSE SERPL-MCNC: 264 MG/DL (ref 70–110)
HCT VFR BLD AUTO: 30.5 % (ref 37–48.5)
HGB BLD-MCNC: 10.1 G/DL (ref 12–16)
IMM GRANULOCYTES # BLD AUTO: 0.05 K/UL (ref 0–0.04)
IMM GRANULOCYTES NFR BLD AUTO: 0.7 % (ref 0–0.5)
LYMPHOCYTES # BLD AUTO: 1.6 K/UL (ref 1–4.8)
LYMPHOCYTES NFR BLD: 22.9 % (ref 18–48)
MAGNESIUM SERPL-MCNC: 1.9 MG/DL (ref 1.6–2.6)
MCH RBC QN AUTO: 29.9 PG (ref 27–31)
MCHC RBC AUTO-ENTMCNC: 33.1 G/DL (ref 32–36)
MCV RBC AUTO: 90 FL (ref 82–98)
MONOCYTES # BLD AUTO: 0.8 K/UL (ref 0.3–1)
MONOCYTES NFR BLD: 11.8 % (ref 4–15)
NEUTROPHILS # BLD AUTO: 4.4 K/UL (ref 1.8–7.7)
NEUTROPHILS NFR BLD: 62.5 % (ref 38–73)
NRBC BLD-RTO: 0 /100 WBC
PLATELET # BLD AUTO: 207 K/UL (ref 150–450)
PMV BLD AUTO: 10.9 FL (ref 9.2–12.9)
POTASSIUM SERPL-SCNC: 3.8 MMOL/L (ref 3.5–5.1)
RBC # BLD AUTO: 3.38 M/UL (ref 4–5.4)
SODIUM SERPL-SCNC: 135 MMOL/L (ref 136–145)
WBC # BLD AUTO: 7.11 K/UL (ref 3.9–12.7)

## 2021-05-22 PROCEDURE — 83735 ASSAY OF MAGNESIUM: CPT | Performed by: INTERNAL MEDICINE

## 2021-05-22 PROCEDURE — 92526 ORAL FUNCTION THERAPY: CPT

## 2021-05-22 PROCEDURE — 80048 BASIC METABOLIC PNL TOTAL CA: CPT | Performed by: INTERNAL MEDICINE

## 2021-05-22 PROCEDURE — 97162 PT EVAL MOD COMPLEX 30 MIN: CPT

## 2021-05-22 PROCEDURE — 25000003 PHARM REV CODE 250: Performed by: INTERNAL MEDICINE

## 2021-05-22 PROCEDURE — 36415 COLL VENOUS BLD VENIPUNCTURE: CPT | Performed by: INTERNAL MEDICINE

## 2021-05-22 PROCEDURE — 97116 GAIT TRAINING THERAPY: CPT

## 2021-05-22 PROCEDURE — 25000003 PHARM REV CODE 250: Performed by: NURSE PRACTITIONER

## 2021-05-22 PROCEDURE — 85025 COMPLETE CBC W/AUTO DIFF WBC: CPT | Performed by: INTERNAL MEDICINE

## 2021-05-22 PROCEDURE — 63600175 PHARM REV CODE 636 W HCPCS: Performed by: INTERNAL MEDICINE

## 2021-05-22 RX ORDER — INSULIN DETEMIR 100 [IU]/ML
15 INJECTION, SOLUTION SUBCUTANEOUS NIGHTLY
Qty: 2 BOX | Refills: 3
Start: 2021-05-22 | End: 2021-06-03 | Stop reason: SDUPTHER

## 2021-05-22 RX ORDER — BISACODYL 5 MG
5 TABLET, DELAYED RELEASE (ENTERIC COATED) ORAL DAILY PRN
Refills: 0
Start: 2021-05-22 | End: 2022-03-10

## 2021-05-22 RX ORDER — INSULIN ASPART 100 [IU]/ML
INJECTION, SOLUTION INTRAVENOUS; SUBCUTANEOUS
Qty: 3 BOX | Refills: 2
Start: 2021-05-22 | End: 2021-06-03 | Stop reason: SDUPTHER

## 2021-05-22 RX ADMIN — CALCIUM ACETATE 667 MG: 667 CAPSULE ORAL at 07:05

## 2021-05-22 RX ADMIN — ATORVASTATIN CALCIUM 80 MG: 40 TABLET, FILM COATED ORAL at 08:05

## 2021-05-22 RX ADMIN — LOSARTAN POTASSIUM 25 MG: 25 TABLET, FILM COATED ORAL at 08:05

## 2021-05-22 RX ADMIN — AMLODIPINE BESYLATE 10 MG: 5 TABLET ORAL at 08:05

## 2021-05-22 RX ADMIN — CALCIUM ACETATE 667 MG: 667 CAPSULE ORAL at 11:05

## 2021-05-22 RX ADMIN — PANTOPRAZOLE SODIUM 40 MG: 40 TABLET, DELAYED RELEASE ORAL at 08:05

## 2021-05-22 RX ADMIN — ASPIRIN 81 MG: 81 TABLET, DELAYED RELEASE ORAL at 08:05

## 2021-05-22 RX ADMIN — ACETAMINOPHEN 650 MG: 325 TABLET, FILM COATED ORAL at 03:05

## 2021-05-22 RX ADMIN — HUMAN INSULIN 3 UNITS: 100 INJECTION, SOLUTION SUBCUTANEOUS at 08:05

## 2021-05-23 PROCEDURE — G0180 PR HOME HEALTH MD CERTIFICATION: ICD-10-PCS | Mod: ,,, | Performed by: FAMILY MEDICINE

## 2021-05-23 PROCEDURE — G0180 MD CERTIFICATION HHA PATIENT: HCPCS | Mod: ,,, | Performed by: FAMILY MEDICINE

## 2021-05-24 LAB
BACTERIA BLD CULT: NORMAL
BACTERIA BLD CULT: NORMAL

## 2021-05-24 RX ORDER — PANTOPRAZOLE SODIUM 40 MG/1
40 TABLET, DELAYED RELEASE ORAL DAILY
Qty: 30 TABLET | Refills: 3 | Status: SHIPPED | OUTPATIENT
Start: 2021-05-24 | End: 2021-05-28 | Stop reason: SDUPTHER

## 2021-05-25 ENCOUNTER — PATIENT OUTREACH (OUTPATIENT)
Dept: ADMINISTRATIVE | Facility: OTHER | Age: 56
End: 2021-05-25

## 2021-05-25 ENCOUNTER — PATIENT MESSAGE (OUTPATIENT)
Dept: ADMINISTRATIVE | Facility: OTHER | Age: 56
End: 2021-05-25

## 2021-05-25 DIAGNOSIS — Z12.31 OTHER SCREENING MAMMOGRAM: Primary | ICD-10-CM

## 2021-05-27 ENCOUNTER — TELEPHONE (OUTPATIENT)
Dept: DIABETES | Facility: CLINIC | Age: 56
End: 2021-05-27

## 2021-05-28 ENCOUNTER — OFFICE VISIT (OUTPATIENT)
Dept: FAMILY MEDICINE | Facility: CLINIC | Age: 56
End: 2021-05-28
Payer: MEDICARE

## 2021-05-28 VITALS — HEART RATE: 70 BPM | DIASTOLIC BLOOD PRESSURE: 70 MMHG | SYSTOLIC BLOOD PRESSURE: 134 MMHG

## 2021-05-28 DIAGNOSIS — E11.69 HYPERLIPIDEMIA ASSOCIATED WITH TYPE 2 DIABETES MELLITUS: ICD-10-CM

## 2021-05-28 DIAGNOSIS — K21.9 GASTROESOPHAGEAL REFLUX DISEASE, UNSPECIFIED WHETHER ESOPHAGITIS PRESENT: ICD-10-CM

## 2021-05-28 DIAGNOSIS — E78.00 HYPERCHOLESTEREMIA: ICD-10-CM

## 2021-05-28 DIAGNOSIS — F41.1 GAD (GENERALIZED ANXIETY DISORDER): ICD-10-CM

## 2021-05-28 DIAGNOSIS — E78.5 HYPERLIPIDEMIA ASSOCIATED WITH TYPE 2 DIABETES MELLITUS: ICD-10-CM

## 2021-05-28 DIAGNOSIS — E11.65 TYPE 2 DIABETES MELLITUS WITH HYPERGLYCEMIA, WITHOUT LONG-TERM CURRENT USE OF INSULIN: Primary | ICD-10-CM

## 2021-05-28 PROCEDURE — 3046F PR MOST RECENT HEMOGLOBIN A1C LEVEL > 9.0%: ICD-10-PCS | Mod: CPTII,95,, | Performed by: FAMILY MEDICINE

## 2021-05-28 PROCEDURE — 3046F HEMOGLOBIN A1C LEVEL >9.0%: CPT | Mod: CPTII,95,, | Performed by: FAMILY MEDICINE

## 2021-05-28 PROCEDURE — 99442 PR PHYSICIAN TELEPHONE EVALUATION 11-20 MIN: CPT | Mod: 95,,, | Performed by: FAMILY MEDICINE

## 2021-05-28 PROCEDURE — 99442 PR PHYSICIAN TELEPHONE EVALUATION 11-20 MIN: ICD-10-PCS | Mod: 95,,, | Performed by: FAMILY MEDICINE

## 2021-05-28 RX ORDER — PANTOPRAZOLE SODIUM 40 MG/1
40 TABLET, DELAYED RELEASE ORAL DAILY
Qty: 30 TABLET | Refills: 3 | Status: SHIPPED | OUTPATIENT
Start: 2021-05-28 | End: 2021-05-28 | Stop reason: SDUPTHER

## 2021-05-28 RX ORDER — ESCITALOPRAM OXALATE 20 MG/1
20 TABLET ORAL DAILY
Qty: 90 TABLET | Refills: 3 | Status: SHIPPED | OUTPATIENT
Start: 2021-05-28 | End: 2022-03-10

## 2021-05-28 RX ORDER — ATORVASTATIN CALCIUM 80 MG/1
80 TABLET, FILM COATED ORAL DAILY
Qty: 90 TABLET | Refills: 3 | Status: SHIPPED | OUTPATIENT
Start: 2021-05-28 | End: 2021-05-28 | Stop reason: SDUPTHER

## 2021-05-28 RX ORDER — EZETIMIBE 10 MG/1
10 TABLET ORAL DAILY
Qty: 90 TABLET | Refills: 3 | Status: SHIPPED | OUTPATIENT
Start: 2021-05-28 | End: 2022-03-10

## 2021-05-28 RX ORDER — FENOFIBRATE 160 MG/1
160 TABLET ORAL DAILY
Qty: 90 TABLET | Refills: 3 | Status: SHIPPED | OUTPATIENT
Start: 2021-05-28 | End: 2022-05-04

## 2021-05-28 RX ORDER — ATORVASTATIN CALCIUM 80 MG/1
80 TABLET, FILM COATED ORAL DAILY
Qty: 90 TABLET | Refills: 3 | Status: ON HOLD | OUTPATIENT
Start: 2021-05-28 | End: 2022-05-19 | Stop reason: HOSPADM

## 2021-05-28 RX ORDER — EZETIMIBE 10 MG/1
10 TABLET ORAL DAILY
Qty: 90 TABLET | Refills: 3 | Status: SHIPPED | OUTPATIENT
Start: 2021-05-28 | End: 2021-05-28 | Stop reason: SDUPTHER

## 2021-05-28 RX ORDER — ESCITALOPRAM OXALATE 20 MG/1
20 TABLET ORAL DAILY
Qty: 90 TABLET | Refills: 3 | Status: SHIPPED | OUTPATIENT
Start: 2021-05-28 | End: 2021-05-28 | Stop reason: SDUPTHER

## 2021-05-28 RX ORDER — PANTOPRAZOLE SODIUM 40 MG/1
40 TABLET, DELAYED RELEASE ORAL DAILY
Qty: 30 TABLET | Refills: 3 | Status: SHIPPED | OUTPATIENT
Start: 2021-05-28 | End: 2022-03-10

## 2021-05-28 RX ORDER — FENOFIBRATE 160 MG/1
160 TABLET ORAL DAILY
Qty: 90 TABLET | Refills: 3 | Status: SHIPPED | OUTPATIENT
Start: 2021-05-28 | End: 2021-05-28 | Stop reason: SDUPTHER

## 2021-06-01 ENCOUNTER — TELEPHONE (OUTPATIENT)
Dept: DIABETES | Facility: CLINIC | Age: 56
End: 2021-06-01

## 2021-06-02 ENCOUNTER — TELEPHONE (OUTPATIENT)
Dept: FAMILY MEDICINE | Facility: CLINIC | Age: 56
End: 2021-06-02

## 2021-06-03 ENCOUNTER — OFFICE VISIT (OUTPATIENT)
Dept: DIABETES | Facility: CLINIC | Age: 56
End: 2021-06-03
Payer: MEDICARE

## 2021-06-03 ENCOUNTER — TELEPHONE (OUTPATIENT)
Dept: DIABETES | Facility: CLINIC | Age: 56
End: 2021-06-03

## 2021-06-03 VITALS — BODY MASS INDEX: 32.89 KG/M2 | WEIGHT: 210 LBS

## 2021-06-03 DIAGNOSIS — E11.22 TYPE 2 DIABETES MELLITUS WITH CHRONIC KIDNEY DISEASE ON CHRONIC DIALYSIS, WITH LONG-TERM CURRENT USE OF INSULIN: ICD-10-CM

## 2021-06-03 DIAGNOSIS — E11.65 TYPE 2 DIABETES MELLITUS WITH HYPERGLYCEMIA, WITHOUT LONG-TERM CURRENT USE OF INSULIN: Primary | ICD-10-CM

## 2021-06-03 DIAGNOSIS — N18.6 TYPE 2 DIABETES MELLITUS WITH CHRONIC KIDNEY DISEASE ON CHRONIC DIALYSIS, WITH LONG-TERM CURRENT USE OF INSULIN: ICD-10-CM

## 2021-06-03 DIAGNOSIS — Z79.4 TYPE 2 DIABETES MELLITUS WITH HYPOGLYCEMIA AND COMA, WITH LONG-TERM CURRENT USE OF INSULIN: ICD-10-CM

## 2021-06-03 DIAGNOSIS — E66.9 OBESITY (BMI 30.0-34.9): ICD-10-CM

## 2021-06-03 DIAGNOSIS — Z79.4 TYPE 2 DIABETES MELLITUS WITH CHRONIC KIDNEY DISEASE ON CHRONIC DIALYSIS, WITH LONG-TERM CURRENT USE OF INSULIN: ICD-10-CM

## 2021-06-03 DIAGNOSIS — E11.649 TYPE 2 DIABETES MELLITUS WITH HYPOGLYCEMIA UNAWARENESS: ICD-10-CM

## 2021-06-03 DIAGNOSIS — E11.641 TYPE 2 DIABETES MELLITUS WITH HYPOGLYCEMIA AND COMA, WITH LONG-TERM CURRENT USE OF INSULIN: ICD-10-CM

## 2021-06-03 DIAGNOSIS — N18.6 ESRD (END STAGE RENAL DISEASE): ICD-10-CM

## 2021-06-03 DIAGNOSIS — Z86.73 HISTORY OF TIA (TRANSIENT ISCHEMIC ATTACK): ICD-10-CM

## 2021-06-03 DIAGNOSIS — Z99.2 TYPE 2 DIABETES MELLITUS WITH CHRONIC KIDNEY DISEASE ON CHRONIC DIALYSIS, WITH LONG-TERM CURRENT USE OF INSULIN: ICD-10-CM

## 2021-06-03 PROBLEM — E10.649 TYPE 1 DIABETES MELLITUS WITH HYPOGLYCEMIA UNAWARENESS: Status: ACTIVE | Noted: 2021-06-03

## 2021-06-03 PROBLEM — E11.29 TYPE 2 DIABETES MELLITUS WITH KIDNEY COMPLICATION, WITH LONG-TERM CURRENT USE OF INSULIN: Status: ACTIVE | Noted: 2020-06-25

## 2021-06-03 PROBLEM — E10.649 TYPE 1 DIABETES MELLITUS WITH HYPOGLYCEMIA UNAWARENESS: Status: RESOLVED | Noted: 2021-06-03 | Resolved: 2021-06-03

## 2021-06-03 PROCEDURE — 99214 PR OFFICE/OUTPT VISIT, EST, LEVL IV, 30-39 MIN: ICD-10-PCS | Mod: 95,,, | Performed by: NURSE PRACTITIONER

## 2021-06-03 PROCEDURE — 3046F HEMOGLOBIN A1C LEVEL >9.0%: CPT | Mod: CPTII,95,, | Performed by: NURSE PRACTITIONER

## 2021-06-03 PROCEDURE — 1111F PR DISCHARGE MEDS RECONCILED W/ CURRENT OUTPATIENT MED LIST: ICD-10-PCS | Mod: CPTII,95,, | Performed by: NURSE PRACTITIONER

## 2021-06-03 PROCEDURE — 3008F BODY MASS INDEX DOCD: CPT | Mod: CPTII,95,, | Performed by: NURSE PRACTITIONER

## 2021-06-03 PROCEDURE — 3046F PR MOST RECENT HEMOGLOBIN A1C LEVEL > 9.0%: ICD-10-PCS | Mod: CPTII,95,, | Performed by: NURSE PRACTITIONER

## 2021-06-03 PROCEDURE — 1111F DSCHRG MED/CURRENT MED MERGE: CPT | Mod: CPTII,95,, | Performed by: NURSE PRACTITIONER

## 2021-06-03 PROCEDURE — 99214 OFFICE O/P EST MOD 30 MIN: CPT | Mod: 95,,, | Performed by: NURSE PRACTITIONER

## 2021-06-03 PROCEDURE — 3008F PR BODY MASS INDEX (BMI) DOCUMENTED: ICD-10-PCS | Mod: CPTII,95,, | Performed by: NURSE PRACTITIONER

## 2021-06-03 RX ORDER — INSULIN ASPART 100 [IU]/ML
INJECTION, SOLUTION INTRAVENOUS; SUBCUTANEOUS
Qty: 1 BOX | Refills: 6 | Status: SHIPPED | OUTPATIENT
Start: 2021-06-03 | End: 2022-09-15 | Stop reason: SDUPTHER

## 2021-06-03 RX ORDER — GLUCAGON 3 MG/1
POWDER NASAL
Qty: 2 EACH | Refills: 1 | Status: SHIPPED | OUTPATIENT
Start: 2021-06-03 | End: 2021-06-03 | Stop reason: SDUPTHER

## 2021-06-03 RX ORDER — GLUCAGON 3 MG/1
POWDER NASAL
Qty: 2 EACH | Refills: 6 | Status: SHIPPED | OUTPATIENT
Start: 2021-06-03 | End: 2021-07-22

## 2021-06-03 RX ORDER — INSULIN DETEMIR 100 [IU]/ML
15-18 INJECTION, SOLUTION SUBCUTANEOUS DAILY
Qty: 1 BOX | Refills: 6 | Status: ON HOLD | OUTPATIENT
Start: 2021-06-03 | End: 2022-06-14 | Stop reason: HOSPADM

## 2021-06-03 RX ORDER — DEXTROSE 40 %
15 GEL (GRAM) ORAL ONCE AS NEEDED
Qty: 37.5 G | Refills: 4 | Status: ON HOLD | OUTPATIENT
Start: 2021-06-03 | End: 2022-05-19 | Stop reason: HOSPADM

## 2021-06-03 NOTE — LETTER
April 10, 2017      Nolberto Lomax MD  [address]  [place] [address]           [facility] [facility]  [address]  [place] [address]  Phone: [phone]          Patient: Leanna García   MR Number: [id number]   YOB: 1965   Date of Visit: 4/10/2017       Dear Dr. Nolberto Lomax:    Thank you for referring Leanna García to me for evaluation. Attached you will find relevant portions of my assessment and plan of care.    If you have questions, please do not hesitate to call me. I look forward to following Leanna García along with you.    Sincerely,    Woody Parry MD    Enclosure  CC:  No Recipients    If you would like to receive this communication electronically, please contact [email or web address] or [phone] to request more information on [employer] access.    For providers and/or their staff who would like to refer a patient to [facility], please contact us through our one-stop-shop provider referral line, [facility] , at [phone].    If you feel you have received this communication in error or would no longer like to receive these types of communications, please e-mail [email or web address]          Who is calling:  Spouse Neela  Reason for Call:  The patient's spouse is returning a call to [facility] regarding patient INR today.  Okay to leave a detailed message: Yes

## 2021-06-07 ENCOUNTER — TELEPHONE (OUTPATIENT)
Dept: DIABETES | Facility: CLINIC | Age: 56
End: 2021-06-07

## 2021-06-10 ENCOUNTER — TELEPHONE (OUTPATIENT)
Dept: DIABETES | Facility: CLINIC | Age: 56
End: 2021-06-10

## 2021-06-18 ENCOUNTER — PATIENT MESSAGE (OUTPATIENT)
Dept: FAMILY MEDICINE | Facility: CLINIC | Age: 56
End: 2021-06-18

## 2021-06-29 ENCOUNTER — EXTERNAL HOME HEALTH (OUTPATIENT)
Dept: HOME HEALTH SERVICES | Facility: HOSPITAL | Age: 56
End: 2021-06-29
Payer: MEDICARE

## 2021-07-19 ENCOUNTER — PATIENT OUTREACH (OUTPATIENT)
Dept: ADMINISTRATIVE | Facility: OTHER | Age: 56
End: 2021-07-19

## 2021-07-22 ENCOUNTER — PATIENT MESSAGE (OUTPATIENT)
Dept: DIABETES | Facility: CLINIC | Age: 56
End: 2021-07-22

## 2021-07-22 ENCOUNTER — OFFICE VISIT (OUTPATIENT)
Dept: DIABETES | Facility: CLINIC | Age: 56
End: 2021-07-22
Payer: MEDICARE

## 2021-07-22 ENCOUNTER — TELEPHONE (OUTPATIENT)
Dept: DIABETES | Facility: CLINIC | Age: 56
End: 2021-07-22

## 2021-07-22 ENCOUNTER — PATIENT MESSAGE (OUTPATIENT)
Dept: FAMILY MEDICINE | Facility: CLINIC | Age: 56
End: 2021-07-22

## 2021-07-22 DIAGNOSIS — N18.6 TYPE 2 DIABETES MELLITUS WITH CHRONIC KIDNEY DISEASE ON CHRONIC DIALYSIS, WITH LONG-TERM CURRENT USE OF INSULIN: ICD-10-CM

## 2021-07-22 DIAGNOSIS — Z79.4 TYPE 2 DIABETES MELLITUS WITH CHRONIC KIDNEY DISEASE ON CHRONIC DIALYSIS, WITH LONG-TERM CURRENT USE OF INSULIN: ICD-10-CM

## 2021-07-22 DIAGNOSIS — Z79.4 TYPE 2 DIABETES MELLITUS WITH HYPOGLYCEMIA AND COMA, WITH LONG-TERM CURRENT USE OF INSULIN: ICD-10-CM

## 2021-07-22 DIAGNOSIS — Z71.9 HEALTH EDUCATION/COUNSELING: ICD-10-CM

## 2021-07-22 DIAGNOSIS — N18.6 ESRD (END STAGE RENAL DISEASE): ICD-10-CM

## 2021-07-22 DIAGNOSIS — Z99.2 TYPE 2 DIABETES MELLITUS WITH CHRONIC KIDNEY DISEASE ON CHRONIC DIALYSIS, WITH LONG-TERM CURRENT USE OF INSULIN: ICD-10-CM

## 2021-07-22 DIAGNOSIS — Z59.9 FINANCIAL DIFFICULTIES: ICD-10-CM

## 2021-07-22 DIAGNOSIS — E11.22 TYPE 2 DIABETES MELLITUS WITH CHRONIC KIDNEY DISEASE ON CHRONIC DIALYSIS, WITH LONG-TERM CURRENT USE OF INSULIN: ICD-10-CM

## 2021-07-22 DIAGNOSIS — E11.641 TYPE 2 DIABETES MELLITUS WITH HYPOGLYCEMIA AND COMA, WITH LONG-TERM CURRENT USE OF INSULIN: ICD-10-CM

## 2021-07-22 DIAGNOSIS — E11.649 TYPE 2 DIABETES MELLITUS WITH HYPOGLYCEMIA UNAWARENESS: ICD-10-CM

## 2021-07-22 DIAGNOSIS — E11.65 TYPE 2 DIABETES MELLITUS WITH HYPERGLYCEMIA, WITHOUT LONG-TERM CURRENT USE OF INSULIN: Primary | ICD-10-CM

## 2021-07-22 DIAGNOSIS — Z91.199 NONCOMPLIANCE WITH DIABETES TREATMENT: ICD-10-CM

## 2021-07-22 PROCEDURE — G0179 MD RECERTIFICATION HHA PT: HCPCS | Mod: ,,, | Performed by: FAMILY MEDICINE

## 2021-07-22 PROCEDURE — 3046F HEMOGLOBIN A1C LEVEL >9.0%: CPT | Mod: CPTII,95,, | Performed by: NURSE PRACTITIONER

## 2021-07-22 PROCEDURE — 99214 OFFICE O/P EST MOD 30 MIN: CPT | Mod: 95,,, | Performed by: NURSE PRACTITIONER

## 2021-07-22 PROCEDURE — 99214 PR OFFICE/OUTPT VISIT, EST, LEVL IV, 30-39 MIN: ICD-10-PCS | Mod: 95,,, | Performed by: NURSE PRACTITIONER

## 2021-07-22 PROCEDURE — G0179 PR HOME HEALTH MD RECERTIFICATION: ICD-10-PCS | Mod: ,,, | Performed by: FAMILY MEDICINE

## 2021-07-22 PROCEDURE — 3046F PR MOST RECENT HEMOGLOBIN A1C LEVEL > 9.0%: ICD-10-PCS | Mod: CPTII,95,, | Performed by: NURSE PRACTITIONER

## 2021-07-22 RX ORDER — GLUCAGON 3 MG/1
POWDER NASAL
Qty: 2 EACH | Refills: 1 | Status: ON HOLD | OUTPATIENT
Start: 2021-07-22 | End: 2022-05-19 | Stop reason: HOSPADM

## 2021-07-22 SDOH — SOCIAL DETERMINANTS OF HEALTH (SDOH): PROBLEM RELATED TO HOUSING AND ECONOMIC CIRCUMSTANCES, UNSPECIFIED: Z59.9

## 2021-08-04 ENCOUNTER — EXTERNAL HOME HEALTH (OUTPATIENT)
Dept: HOME HEALTH SERVICES | Facility: HOSPITAL | Age: 56
End: 2021-08-04
Payer: MEDICARE

## 2021-08-10 ENCOUNTER — TELEPHONE (OUTPATIENT)
Dept: DIABETES | Facility: CLINIC | Age: 56
End: 2021-08-10

## 2021-08-17 ENCOUNTER — TELEPHONE (OUTPATIENT)
Dept: DIABETES | Facility: CLINIC | Age: 56
End: 2021-08-17

## 2021-08-17 ENCOUNTER — PATIENT MESSAGE (OUTPATIENT)
Dept: FAMILY MEDICINE | Facility: CLINIC | Age: 56
End: 2021-08-17

## 2021-08-17 RX ORDER — MINOXIDIL 2.5 MG/1
5 TABLET ORAL 2 TIMES DAILY
Status: ON HOLD | COMMUNITY
Start: 2021-08-13 | End: 2022-05-19 | Stop reason: HOSPADM

## 2021-08-27 ENCOUNTER — PATIENT OUTREACH (OUTPATIENT)
Dept: ADMINISTRATIVE | Facility: OTHER | Age: 56
End: 2021-08-27

## 2021-08-27 DIAGNOSIS — E11.9 TYPE 2 DIABETES MELLITUS WITHOUT COMPLICATION, UNSPECIFIED WHETHER LONG TERM INSULIN USE: Primary | ICD-10-CM

## 2021-09-14 ENCOUNTER — TELEPHONE (OUTPATIENT)
Dept: FAMILY MEDICINE | Facility: CLINIC | Age: 56
End: 2021-09-14

## 2021-09-14 ENCOUNTER — TELEPHONE (OUTPATIENT)
Dept: DIABETES | Facility: CLINIC | Age: 56
End: 2021-09-14

## 2021-10-08 NOTE — TELEPHONE ENCOUNTER
Patient presented himself to the [facility] desk and stated that he wasn't doing so good and he was severely depressed and it \"isn't good\" and he wanted me to relay the message to Varsha.  Writer proceeded to state to patient that if it is that bad, he should go to the Emergency Room and be evaluated because that is what Varsha would suggest to patient as well.  Patient agreed and stated, \"Ok\" and left to go to the Emergency Room at [facility].   Spoke with patient.

## 2021-10-11 ENCOUNTER — PATIENT OUTREACH (OUTPATIENT)
Dept: ADMINISTRATIVE | Facility: HOSPITAL | Age: 56
End: 2021-10-11

## 2021-10-11 ENCOUNTER — PATIENT MESSAGE (OUTPATIENT)
Dept: ADMINISTRATIVE | Facility: HOSPITAL | Age: 56
End: 2021-10-11

## 2021-11-18 ENCOUNTER — PATIENT MESSAGE (OUTPATIENT)
Dept: ADMINISTRATIVE | Facility: HOSPITAL | Age: 56
End: 2021-11-18
Payer: MEDICARE

## 2021-11-26 ENCOUNTER — TELEPHONE (OUTPATIENT)
Dept: TRANSPLANT | Facility: CLINIC | Age: 56
End: 2021-11-26
Payer: MEDICARE

## 2021-12-02 ENCOUNTER — TELEPHONE (OUTPATIENT)
Dept: FAMILY MEDICINE | Facility: CLINIC | Age: 56
End: 2021-12-02
Payer: MEDICARE

## 2021-12-03 ENCOUNTER — HOSPITAL ENCOUNTER (OUTPATIENT)
Dept: RADIOLOGY | Facility: CLINIC | Age: 56
Discharge: HOME | End: 2021-12-03
Attending: NURSE PRACTITIONER
Payer: MEDICARE

## 2021-12-03 ENCOUNTER — OFFICE VISIT (OUTPATIENT)
Dept: FAMILY MEDICINE | Facility: CLINIC | Age: 56
End: 2021-12-03
Payer: MEDICARE

## 2021-12-03 DIAGNOSIS — M79.675 PAIN AND SWELLING OF TOE OF LEFT FOOT: Primary | ICD-10-CM

## 2021-12-03 DIAGNOSIS — E66.9 OBESITY (BMI 30.0-34.9): ICD-10-CM

## 2021-12-03 DIAGNOSIS — M25.572 PAIN AND SWELLING OF LEFT ANKLE: ICD-10-CM

## 2021-12-03 DIAGNOSIS — M79.89 PAIN AND SWELLING OF TOE OF LEFT FOOT: Primary | ICD-10-CM

## 2021-12-03 DIAGNOSIS — M25.472 PAIN AND SWELLING OF LEFT ANKLE: ICD-10-CM

## 2021-12-03 DIAGNOSIS — J32.9 CHRONIC SINUSITIS, UNSPECIFIED LOCATION: ICD-10-CM

## 2021-12-03 DIAGNOSIS — M79.675 PAIN AND SWELLING OF TOE OF LEFT FOOT: ICD-10-CM

## 2021-12-03 DIAGNOSIS — M79.89 PAIN AND SWELLING OF TOE OF LEFT FOOT: ICD-10-CM

## 2021-12-03 PROCEDURE — 73630 X-RAY EXAM OF FOOT: CPT | Mod: TC,FY,PO,LT,TXP

## 2021-12-03 PROCEDURE — 4010F ACE/ARB THERAPY RXD/TAKEN: CPT | Mod: CPTII,S$GLB,, | Performed by: NURSE PRACTITIONER

## 2021-12-03 PROCEDURE — 99213 PR OFFICE/OUTPT VISIT, EST, LEVL III, 20-29 MIN: ICD-10-PCS | Mod: S$GLB,,, | Performed by: NURSE PRACTITIONER

## 2021-12-03 PROCEDURE — 73630 X-RAY EXAM OF FOOT: CPT | Mod: 26,LT,S$GLB,TXP | Performed by: RADIOLOGY

## 2021-12-03 PROCEDURE — 99999 PR PBB SHADOW E&M-EST. PATIENT-LVL V: ICD-10-PCS | Mod: PBBFAC,,, | Performed by: NURSE PRACTITIONER

## 2021-12-03 PROCEDURE — 73630 XR FOOT COMPLETE 3 VIEW LEFT: ICD-10-PCS | Mod: 26,LT,S$GLB,TXP | Performed by: RADIOLOGY

## 2021-12-03 PROCEDURE — 3066F NEPHROPATHY DOC TX: CPT | Mod: CPTII,S$GLB,, | Performed by: NURSE PRACTITIONER

## 2021-12-03 PROCEDURE — 99999 PR PBB SHADOW E&M-EST. PATIENT-LVL V: CPT | Mod: PBBFAC,,, | Performed by: NURSE PRACTITIONER

## 2021-12-03 PROCEDURE — 3066F PR DOCUMENTATION OF TREATMENT FOR NEPHROPATHY: ICD-10-PCS | Mod: CPTII,S$GLB,, | Performed by: NURSE PRACTITIONER

## 2021-12-03 PROCEDURE — 4010F PR ACE/ARB THEARPY RXD/TAKEN: ICD-10-PCS | Mod: CPTII,S$GLB,, | Performed by: NURSE PRACTITIONER

## 2021-12-03 PROCEDURE — 99213 OFFICE O/P EST LOW 20 MIN: CPT | Mod: S$GLB,,, | Performed by: NURSE PRACTITIONER

## 2021-12-03 RX ORDER — AZITHROMYCIN 250 MG/1
TABLET, FILM COATED ORAL
Qty: 6 TABLET | Refills: 0 | Status: SHIPPED | OUTPATIENT
Start: 2021-12-03 | End: 2021-12-08

## 2021-12-03 RX ORDER — DOXERCALCIFEROL 0.5 UG/1
6 CAPSULE ORAL
COMMUNITY
Start: 2021-09-24 | End: 2022-03-10

## 2021-12-03 RX ORDER — CYCLOBENZAPRINE HCL 5 MG
5 TABLET ORAL 3 TIMES DAILY PRN
Qty: 10 TABLET | Refills: 0 | Status: SHIPPED | OUTPATIENT
Start: 2021-12-03 | End: 2021-12-08

## 2021-12-08 ENCOUNTER — TELEPHONE (OUTPATIENT)
Dept: TRANSPLANT | Facility: CLINIC | Age: 56
End: 2021-12-08
Payer: MEDICARE

## 2021-12-13 VITALS
BODY MASS INDEX: 33.57 KG/M2 | SYSTOLIC BLOOD PRESSURE: 138 MMHG | WEIGHT: 213.88 LBS | HEIGHT: 67 IN | TEMPERATURE: 98 F | DIASTOLIC BLOOD PRESSURE: 80 MMHG | HEART RATE: 99 BPM | OXYGEN SATURATION: 94 %

## 2021-12-14 ENCOUNTER — OFFICE VISIT (OUTPATIENT)
Dept: PODIATRY | Facility: CLINIC | Age: 56
End: 2021-12-14
Payer: MEDICARE

## 2021-12-14 VITALS — WEIGHT: 213 LBS | BODY MASS INDEX: 33.43 KG/M2 | HEIGHT: 67 IN

## 2021-12-14 DIAGNOSIS — M21.41 PES PLANUS OF BOTH FEET: ICD-10-CM

## 2021-12-14 DIAGNOSIS — Z89.422 HISTORY OF AMPUTATION OF LESSER TOE OF LEFT FOOT: ICD-10-CM

## 2021-12-14 DIAGNOSIS — M25.572 PAIN AND SWELLING OF LEFT ANKLE: ICD-10-CM

## 2021-12-14 DIAGNOSIS — M79.672 PAIN IN LEFT FOOT: Primary | ICD-10-CM

## 2021-12-14 DIAGNOSIS — M21.42 PES PLANUS OF BOTH FEET: ICD-10-CM

## 2021-12-14 DIAGNOSIS — S99.922A INJURY OF LEFT FOOT, INITIAL ENCOUNTER: ICD-10-CM

## 2021-12-14 DIAGNOSIS — M25.472 PAIN AND SWELLING OF LEFT ANKLE: ICD-10-CM

## 2021-12-14 PROCEDURE — 4010F PR ACE/ARB THEARPY RXD/TAKEN: ICD-10-PCS | Mod: CPTII,S$GLB,, | Performed by: PODIATRIST

## 2021-12-14 PROCEDURE — 3066F PR DOCUMENTATION OF TREATMENT FOR NEPHROPATHY: ICD-10-PCS | Mod: CPTII,S$GLB,, | Performed by: PODIATRIST

## 2021-12-14 PROCEDURE — 99204 OFFICE O/P NEW MOD 45 MIN: CPT | Mod: S$GLB,,, | Performed by: PODIATRIST

## 2021-12-14 PROCEDURE — 4010F ACE/ARB THERAPY RXD/TAKEN: CPT | Mod: CPTII,S$GLB,, | Performed by: PODIATRIST

## 2021-12-14 PROCEDURE — 3066F NEPHROPATHY DOC TX: CPT | Mod: CPTII,S$GLB,, | Performed by: PODIATRIST

## 2021-12-14 PROCEDURE — 99204 PR OFFICE/OUTPT VISIT, NEW, LEVL IV, 45-59 MIN: ICD-10-PCS | Mod: S$GLB,,, | Performed by: PODIATRIST

## 2021-12-14 RX ORDER — CARISOPRODOL 350 MG/1
350 TABLET ORAL 3 TIMES DAILY PRN
Qty: 20 TABLET | Refills: 0 | Status: SHIPPED | OUTPATIENT
Start: 2021-12-14 | End: 2021-12-24

## 2021-12-14 RX ORDER — MINOXIDIL 10 MG/1
TABLET ORAL
COMMUNITY
Start: 2021-11-15 | End: 2022-03-10 | Stop reason: SDUPTHER

## 2021-12-15 ENCOUNTER — TELEPHONE (OUTPATIENT)
Dept: PODIATRY | Facility: CLINIC | Age: 56
End: 2021-12-15
Payer: MEDICARE

## 2021-12-15 ENCOUNTER — PATIENT MESSAGE (OUTPATIENT)
Dept: TRANSPLANT | Facility: CLINIC | Age: 56
End: 2021-12-15
Payer: MEDICARE

## 2021-12-15 DIAGNOSIS — Z76.82 ORGAN TRANSPLANT CANDIDATE: ICD-10-CM

## 2021-12-16 ENCOUNTER — TELEPHONE (OUTPATIENT)
Dept: PODIATRY | Facility: CLINIC | Age: 56
End: 2021-12-16
Payer: MEDICARE

## 2021-12-16 DIAGNOSIS — M25.572 PAIN AND SWELLING OF LEFT ANKLE: ICD-10-CM

## 2021-12-16 DIAGNOSIS — M21.42 PES PLANUS OF BOTH FEET: ICD-10-CM

## 2021-12-16 DIAGNOSIS — M21.41 PES PLANUS OF BOTH FEET: ICD-10-CM

## 2021-12-16 DIAGNOSIS — S99.922A INJURY OF LEFT FOOT, INITIAL ENCOUNTER: Primary | ICD-10-CM

## 2021-12-16 DIAGNOSIS — M25.472 PAIN AND SWELLING OF LEFT ANKLE: ICD-10-CM

## 2021-12-16 RX ORDER — CARISOPRODOL 350 MG/1
350 TABLET ORAL 3 TIMES DAILY PRN
Qty: 20 TABLET | Refills: 0 | Status: SHIPPED | OUTPATIENT
Start: 2021-12-16 | End: 2021-12-26

## 2021-12-17 ENCOUNTER — PATIENT OUTREACH (OUTPATIENT)
Dept: ADMINISTRATIVE | Facility: HOSPITAL | Age: 56
End: 2021-12-17
Payer: MEDICARE

## 2021-12-22 ENCOUNTER — TELEPHONE (OUTPATIENT)
Dept: TRANSPLANT | Facility: CLINIC | Age: 56
End: 2021-12-22
Payer: MEDICARE

## 2022-03-07 ENCOUNTER — PATIENT OUTREACH (OUTPATIENT)
Dept: ADMINISTRATIVE | Facility: HOSPITAL | Age: 57
End: 2022-03-07
Payer: MEDICARE

## 2022-03-07 NOTE — PROGRESS NOTES
Spoke to patient to complete her history for her upcoming RR appointment her  will come with her to her appointment she is aware that she have to bring a small breakfast/snack to eat after blood is drawn she will not need a

## 2022-03-07 NOTE — PROGRESS NOTES
Mammogram GAP report-I spoke to pt regarding her overdue Mammogram, pt stated she will call back to schedule.

## 2022-03-10 ENCOUNTER — HOSPITAL ENCOUNTER (OUTPATIENT)
Dept: RADIOLOGY | Facility: HOSPITAL | Age: 57
Discharge: HOME | End: 2022-03-10
Attending: NURSE PRACTITIONER
Payer: MEDICARE

## 2022-03-10 ENCOUNTER — TELEPHONE (OUTPATIENT)
Dept: TRANSPLANT | Facility: CLINIC | Age: 57
End: 2022-03-10

## 2022-03-10 ENCOUNTER — OFFICE VISIT (OUTPATIENT)
Dept: TRANSPLANT | Facility: CLINIC | Age: 57
End: 2022-03-10
Payer: MEDICARE

## 2022-03-10 ENCOUNTER — TELEPHONE (OUTPATIENT)
Dept: TRANSPLANT | Facility: CLINIC | Age: 57
End: 2022-03-10
Payer: MEDICARE

## 2022-03-10 VITALS
SYSTOLIC BLOOD PRESSURE: 191 MMHG | BODY MASS INDEX: 32.76 KG/M2 | WEIGHT: 208.75 LBS | OXYGEN SATURATION: 97 % | RESPIRATION RATE: 17 BRPM | TEMPERATURE: 97 F | DIASTOLIC BLOOD PRESSURE: 91 MMHG | HEIGHT: 67 IN | HEART RATE: 73 BPM

## 2022-03-10 DIAGNOSIS — Z86.73 HISTORY OF TIA (TRANSIENT ISCHEMIC ATTACK): ICD-10-CM

## 2022-03-10 DIAGNOSIS — Z99.2 ESRD ON HEMODIALYSIS: ICD-10-CM

## 2022-03-10 DIAGNOSIS — E11.22 TYPE 2 DIABETES MELLITUS WITH CHRONIC KIDNEY DISEASE ON CHRONIC DIALYSIS, WITH LONG-TERM CURRENT USE OF INSULIN: ICD-10-CM

## 2022-03-10 DIAGNOSIS — Z76.82 ORGAN TRANSPLANT CANDIDATE: ICD-10-CM

## 2022-03-10 DIAGNOSIS — R93.89 ULTRASOUND SCAN ABNORMAL: Primary | ICD-10-CM

## 2022-03-10 DIAGNOSIS — N18.6 ESRD ON HEMODIALYSIS: ICD-10-CM

## 2022-03-10 DIAGNOSIS — I15.2 HYPERTENSION ASSOCIATED WITH DIABETES: Chronic | ICD-10-CM

## 2022-03-10 DIAGNOSIS — Z79.4 TYPE 2 DIABETES MELLITUS WITH CHRONIC KIDNEY DISEASE ON CHRONIC DIALYSIS, WITH LONG-TERM CURRENT USE OF INSULIN: ICD-10-CM

## 2022-03-10 DIAGNOSIS — E11.59 HYPERTENSION ASSOCIATED WITH DIABETES: Chronic | ICD-10-CM

## 2022-03-10 DIAGNOSIS — E78.5 HYPERLIPIDEMIA, UNSPECIFIED HYPERLIPIDEMIA TYPE: ICD-10-CM

## 2022-03-10 DIAGNOSIS — Z99.2 TYPE 2 DIABETES MELLITUS WITH CHRONIC KIDNEY DISEASE ON CHRONIC DIALYSIS, WITH LONG-TERM CURRENT USE OF INSULIN: ICD-10-CM

## 2022-03-10 DIAGNOSIS — N18.6 TYPE 2 DIABETES MELLITUS WITH CHRONIC KIDNEY DISEASE ON CHRONIC DIALYSIS, WITH LONG-TERM CURRENT USE OF INSULIN: ICD-10-CM

## 2022-03-10 DIAGNOSIS — Z01.818 PRE-TRANSPLANT EVALUATION FOR KIDNEY TRANSPLANT: Primary | ICD-10-CM

## 2022-03-10 PROCEDURE — 99999 PR PBB SHADOW E&M-EST. PATIENT-LVL V: ICD-10-PCS | Mod: PBBFAC,TXP,, | Performed by: NURSE PRACTITIONER

## 2022-03-10 PROCEDURE — 72170 X-RAY EXAM OF PELVIS: CPT | Mod: TC,TXP

## 2022-03-10 PROCEDURE — 93978 VASCULAR STUDY: CPT | Mod: TC,TXP

## 2022-03-10 PROCEDURE — 71046 X-RAY EXAM CHEST 2 VIEWS: CPT | Mod: TC,TXP

## 2022-03-10 PROCEDURE — 99999 PR PBB SHADOW E&M-EST. PATIENT-LVL V: CPT | Mod: PBBFAC,TXP,, | Performed by: NURSE PRACTITIONER

## 2022-03-10 PROCEDURE — 99205 OFFICE O/P NEW HI 60 MIN: CPT | Mod: S$GLB,TXP,, | Performed by: NURSE PRACTITIONER

## 2022-03-10 PROCEDURE — 71046 XR CHEST PA AND LATERAL: ICD-10-PCS | Mod: 26,TXP,, | Performed by: RADIOLOGY

## 2022-03-10 PROCEDURE — 76770 US RETROPERITONEAL COMPLETE: ICD-10-PCS | Mod: 26,TXP,, | Performed by: RADIOLOGY

## 2022-03-10 PROCEDURE — 93978 US DOPP ILIACS BILATERAL: ICD-10-PCS | Mod: 26,TXP,, | Performed by: RADIOLOGY

## 2022-03-10 PROCEDURE — 71046 X-RAY EXAM CHEST 2 VIEWS: CPT | Mod: 26,TXP,, | Performed by: RADIOLOGY

## 2022-03-10 PROCEDURE — 99203 PR OFFICE/OUTPT VISIT, NEW, LEVL III, 30-44 MIN: ICD-10-PCS | Mod: S$GLB,TXP,, | Performed by: TRANSPLANT SURGERY

## 2022-03-10 PROCEDURE — 76770 US EXAM ABDO BACK WALL COMP: CPT | Mod: TC,TXP

## 2022-03-10 PROCEDURE — 72170 XR PELVIS ROUTINE AP: ICD-10-PCS | Mod: 26,TXP,, | Performed by: RADIOLOGY

## 2022-03-10 PROCEDURE — 72170 X-RAY EXAM OF PELVIS: CPT | Mod: 26,TXP,, | Performed by: RADIOLOGY

## 2022-03-10 PROCEDURE — 99205 PR OFFICE/OUTPT VISIT, NEW, LEVL V, 60-74 MIN: ICD-10-PCS | Mod: S$GLB,TXP,, | Performed by: NURSE PRACTITIONER

## 2022-03-10 PROCEDURE — 93978 VASCULAR STUDY: CPT | Mod: 26,TXP,, | Performed by: RADIOLOGY

## 2022-03-10 PROCEDURE — 99203 OFFICE O/P NEW LOW 30 MIN: CPT | Mod: S$GLB,TXP,, | Performed by: TRANSPLANT SURGERY

## 2022-03-10 PROCEDURE — 76770 US EXAM ABDO BACK WALL COMP: CPT | Mod: 26,TXP,, | Performed by: RADIOLOGY

## 2022-03-10 RX ORDER — ERGOCALCIFEROL 1.25 MG/1
50000 CAPSULE ORAL
COMMUNITY
End: 2022-05-04

## 2022-03-10 RX ORDER — EZETIMIBE 10 MG/1
10 TABLET ORAL DAILY
COMMUNITY
End: 2022-05-04

## 2022-03-10 RX ORDER — MULTIVITAMIN
1 TABLET ORAL DAILY
Status: ON HOLD | COMMUNITY
End: 2022-06-30 | Stop reason: HOSPADM

## 2022-03-10 RX ORDER — ESCITALOPRAM OXALATE 10 MG/1
10 TABLET ORAL DAILY
COMMUNITY
End: 2022-05-04

## 2022-03-10 RX ORDER — GABAPENTIN 300 MG/1
300 CAPSULE ORAL NIGHTLY
Status: ON HOLD | COMMUNITY
End: 2022-05-19 | Stop reason: HOSPADM

## 2022-03-10 RX ORDER — ASCORBIC ACID 500 MG
500 TABLET ORAL DAILY
Status: ON HOLD | COMMUNITY
End: 2022-05-19 | Stop reason: HOSPADM

## 2022-03-10 RX ORDER — METHOXY POLYETHYLENE GLYCOL-EPOETIN BETA 30 UG/.3ML
30 INJECTION, SOLUTION INTRAVENOUS
COMMUNITY
End: 2022-05-04

## 2022-03-10 RX ORDER — DOXERCALCIFEROL 4 UG/2ML
8 INJECTION INTRAVENOUS
COMMUNITY
End: 2022-05-04

## 2022-03-10 RX ORDER — SUCROFERRIC OXYHYDROXIDE 500 MG/1
500 TABLET, CHEWABLE ORAL 3 TIMES DAILY
Status: ON HOLD | COMMUNITY
End: 2022-05-19 | Stop reason: HOSPADM

## 2022-03-10 RX ORDER — PANTOPRAZOLE SODIUM 40 MG/1
40 TABLET, DELAYED RELEASE ORAL DAILY
COMMUNITY
End: 2022-05-04

## 2022-03-10 NOTE — PROGRESS NOTES
Transplant Nephrology  Kidney Transplant Recipient Evaluation    Referring Physician: Keyon Ramachandran  Current Nephrologist: Keyon Ramachandran    Subjective:   CC:  Initial evaluation of kidney transplant candidacy.    HPI:  Ms. García is a 56 y.o. year old White female who has presented to be evaluated as a potential kidney transplant recipient.  She has ESRD secondary to diabetic nephropathy.  Patient is currently on hemodialysis started on 9/10/2018. Patient is dialyzing on MWF schedule.  Patient reports that she is tolerating dialysis well.. She has a LUE AV fistula for dialysis access. Running 3.5 hours per session, no hypotension with dialysis.    Initially came for evaluation in 1/2020, but decided to postpone until better caregiver and financial plan.     DM2 >20 years ago, very poorly controlled  -on insulin  +LE neuropathy-interferes with ambulating    3/10/2022   Hemoglobin A1C External 4.0 - 5.6 % >14.0 (A)         HTN-poorly controlled  BP Readings from Last 3 Encounters:   03/10/22 (!) 203/90   12/03/21 138/80   05/28/21 134/70     TIA in 2017, no residual defecits      Functional Status: Uses a walker for ambulation due to LE neuropathy. Requires assistance from myself and caregiver with getting on and off the exam table. Does not do any dedicated exercise, but is able to do housework.   Previous Transplant: no  Anticoagulation/ antiplatelet therapy: no  Potential Donor: no    Current Outpatient Medications   Medication Sig Dispense Refill    amLODIPine (NORVASC) 10 MG tablet Take 10 mg by mouth once daily.      ascorbic acid, vitamin C, (VITAMIN C) 500 MG tablet Take 500 mg by mouth once daily.      atorvastatin (LIPITOR) 80 MG tablet Take 1 tablet (80 mg total) by mouth once daily. 90 tablet 3    blood sugar diagnostic Strp To check BG 4 times daily, to use with insurance preferred meter 450 strip 3    blood-glucose meter (ACCU-CHEK KAYLIE PLUS METER) Misc To use to check blood sugars 4  "times a day 1 each 0    dextrose (GLUCOSE GEL) 40 % gel Take 37.5 mLs (15,000 mg total) by mouth once as needed (hypoglycemia). 37.5 g 4    doxercalciferoL (HECTOROL) 4 mcg/2 mL injection Inject 8 mcg into the vein 3 (three) times a week.      epoetin beta, methoxy peg (MIRCERA) 30 mcg/0.3 mL Syrg Inject 30 mcg as directed every 28 days.      ergocalciferol (VITAMIN D2) 50,000 unit Cap Take 50,000 Units by mouth every 30 days.      EScitalopram oxalate (LEXAPRO) 10 MG tablet Take 10 mg by mouth once daily.      ezetimibe (ZETIA) 10 mg tablet Take 10 mg by mouth once daily.      fenofibrate 160 MG Tab Take 1 tablet (160 mg total) by mouth once daily. 90 tablet 3    gabapentin (NEURONTIN) 300 MG capsule Take 300 mg by mouth every evening.      glucagon (BAQSIMI) 3 mg/actuation Spry 3 mg (one actuation) into a single nostril; if no response, may repeat in 15 minutes using a new intranasal device. 2 each 1    insulin aspart U-100 (NOVOLOG FLEXPEN U-100 INSULIN) 100 unit/mL (3 mL) InPn pen Inject 5-8 units 3 times per day with food. 1 Box 6    insulin detemir U-100 (LEVEMIR FLEXTOUCH U-100 INSULN) 100 unit/mL (3 mL) InPn pen Inject 15-18 Units into the skin once daily. 1 Box 6    lancets Misc To use to check blood sugar 4 times daily. To use with insurance approved meter. Patient needs the round, purple one 450 each 3    lancing device with lancets (ACCU-CHEK SOFT DEV LANCETS) Kit To check blood sugars 4 times per day 400 each 3    losartan (COZAAR) 25 MG tablet Take 25 mg by mouth once daily.       metoprolol tartrate (LOPRESSOR) 25 MG tablet Take 25 mg by mouth 2 (two) times daily.      minoxidiL (LONITEN) 2.5 MG tablet Take 5 mg by mouth 2 (two) times daily.      multivitamin (THERAGRAN) per tablet Take 1 tablet by mouth once daily.      pantoprazole (PROTONIX) 40 MG tablet Take 40 mg by mouth once daily.      pen needle, diabetic (BD ULTRA-FINE ROBERT PEN NEEDLE) 32 gauge x 5/32" Ndle To use 4 times " per day with insulin injections. 450 each 2    sucroferric oxyhydroxide (VELPHORO) 500 mg Chew Take 500 mg by mouth 3 (three) times daily.       No current facility-administered medications for this visit.       Past Medical History:   Diagnosis Date    A-fib     resolved per patient    Arthritis     Bronchitis     Cardiovascular event risk, ASCVD 10-year risk 6.7% 10/15/2016    Diabetes mellitus     Diabetes mellitus type II     Disorder of kidney and ureter     Encounter for blood transfusion     History of colon polyps 11/02/2016    Hyperlipidemia     Hypertension     Stroke      Past Medical and Surgical History: Ms. García  has a past medical history of A-fib, Arthritis, Bronchitis, Cardiovascular event risk, ASCVD 10-year risk 6.7%, Diabetes mellitus, Diabetes mellitus type II, Disorder of kidney and ureter, Encounter for blood transfusion, History of colon polyps, Hyperlipidemia, Hypertension, and Stroke.  She has a past surgical history that includes Hysterectomy; Colonoscopy (N/A, 10/5/2016); Hernia repair (Bilateral, 11/22/2016); and Oophorectomy.    Past Social and Family History: Ms. García reports that she has never smoked. She has never used smokeless tobacco. She reports that she does not drink alcohol and does not use drugs. Her family history includes Cancer in her paternal grandmother; Diabetes in her father, maternal aunt, maternal grandmother, sister, and sister; Heart disease in her maternal grandmother; Hyperlipidemia in her mother; Hypertension in her father and mother.    Review of Systems   Constitutional: Positive for fatigue. Negative for activity change, appetite change and fever.   HENT: Negative for congestion, mouth sores and sore throat.    Eyes: Negative for visual disturbance.   Respiratory: Negative for cough, chest tightness and shortness of breath.    Cardiovascular: Negative for chest pain, palpitations and leg swelling.   Gastrointestinal: Negative for abdominal  "distention, constipation, diarrhea and nausea.   Genitourinary: Negative for difficulty urinating, frequency and hematuria.   Musculoskeletal: Positive for back pain (chronic). Negative for arthralgias, gait problem and myalgias.   Skin: Negative for wound.   Allergic/Immunologic: Negative for environmental allergies, food allergies and immunocompromised state.   Neurological: Negative for dizziness, weakness and numbness.   Psychiatric/Behavioral: Negative for sleep disturbance. The patient is not nervous/anxious.        Objective:   Blood pressure (!) 203/90, pulse 74, temperature 97.3 °F (36.3 °C), temperature source Tympanic, resp. rate 17, height 5' 7.32" (1.71 m), weight 94.7 kg (208 lb 12.4 oz), SpO2 97 %.body mass index is 32.39 kg/m².    Physical Exam  Vitals and nursing note reviewed.   Constitutional:       Appearance: Normal appearance.   HENT:      Head: Normocephalic.   Eyes:      Comments: right eye stye    Cardiovascular:      Rate and Rhythm: Normal rate and regular rhythm.      Heart sounds: Normal heart sounds.   Pulmonary:      Effort: Pulmonary effort is normal.      Breath sounds: Normal breath sounds.   Abdominal:      General: Bowel sounds are normal. There is no distension.      Palpations: Abdomen is soft.      Tenderness: There is no abdominal tenderness.   Musculoskeletal:         General: Normal range of motion.      Comments: LUE AV fistula + thrill    Skin:     General: Skin is warm and dry.   Neurological:      General: No focal deficit present.      Mental Status: She is alert.   Psychiatric:         Behavior: Behavior normal.         Labs:  Lab Results   Component Value Date    WBC 8.86 03/10/2022    HGB 11.6 (L) 03/10/2022    HCT 36.4 (L) 03/10/2022     03/10/2022    K 3.8 03/10/2022    CL 93 (L) 03/10/2022    CO2 30 (H) 03/10/2022    BUN 23 (H) 03/10/2022    CREATININE 4.9 (H) 03/10/2022    EGFRNONAA 9.2 (A) 03/10/2022    CALCIUM 10.2 03/10/2022    PHOS 4.2 03/10/2022    MG " 1.9 05/22/2021    ALBUMIN 4.2 03/10/2022    AST 18 03/10/2022    ALT 16 03/10/2022    UTPCR 7.24 (H) 12/16/2017    .0 (H) 03/10/2022       Lab Results   Component Value Date    BILIRUBINUA Negative 08/28/2018    AMYLASE 55 05/02/2017    LIPASE 27 05/02/2017    PROTEINUA 3+ (A) 08/28/2018    NITRITE Negative 08/28/2018    RBCUA 50 (H) 08/28/2018    WBCUA 40 (H) 08/28/2018       Labs were reviewed with the patient.    Assessment:     1. Pre-transplant evaluation for kidney transplant    2. ESRD on hemodialysis    3. Type 2 diabetes mellitus with chronic kidney disease on chronic dialysis, with long-term current use of insulin    4. Hypertension associated with diabetes    5. Hyperlipidemia, unspecified hyperlipidemia type    6. History of TIA (transient ischemic attack)    7. BMI 32.0-32.9,adult        Plan:   Prior to Listing, will need the following items to be completed:  1. Standard serologies, cardiac and imaging studies   2. Cardiac clearance (DM2)  3. Needs pap, MMG, and colonoscopy  4. Needs to follow up with endocrinology for better DM2 management as A1c>14 today  5. 6 minute walk (functional status)    Transplant Candidacy:   Based on available information, Ms. García is a high-risk kidney transplant candidate due to uncontrolled DM2, decreased functional status.  Meets center eligibility for accepting HCV+ donor offer - yes.  Patient educated on HCV+ donors. Leanna is willing to accept HCV+ donor offer - yes   Patient is a candidate for KDPI > 85 kidney donor offer - no (weight >88kg).  Final determination of transplant candidacy will be made once workup is complete and reviewed by the selection committee.    Patient advised that it is recommended that all transplant candidates, and their close contacts and household members receive Covid vaccination.    JESS HammondOS Patient Status  Functional Status: 60% - Requires occasional assistance but is able to care for needs  Physical  Capacity: Limited Mobility

## 2022-03-10 NOTE — TELEPHONE ENCOUNTER
Patient is aware of need for better glucose control, discussed while in clinic at . Labs, nephrology/provider note routed to referring provider and dialysis unit.   ----- Message from Raina Graff NP sent at 3/10/2022  9:35 AM CST -----  Hemoglobin A1C > 14  Needs better blood sugar control--send results to PCP/Endocrinology for management

## 2022-03-10 NOTE — TELEPHONE ENCOUNTER
Reviewed pt transplant labs.  Notified dialysis unit dietitian of the following abnormal labs via fax and requested their most recent nutrition note on this pt.  Once this note is received it will be scanned into pt's chart.    Ha1c >14  Glu 230  Chol 251  Trg 215

## 2022-03-10 NOTE — PROGRESS NOTES
Transplant Surgery  Kidney Transplant Recipient Evaluation    Referring Physician: Keyon Ramachandran  Current Nephrologist: Keyon Ramachandran    Subjective:     Reason for Visit: evaluate transplant candidacy    History of Present Illness: Leanna García is a 56 y.o. year old female undergoing transplant evaluation.    Dialysis History: Leanna is on hemodialysis.      Transplant History: N/A    Etiology of Renal Disease: Diabetes Mellitus - Type II (based on medical records from referral).    External provider notes reviewed: Yes    Review of Systems  Objective:     Physical Exam:  Constitutional:   Vitals reviewed: yes   Well-nourished and well-groomed: yes  Eyes:   Sclerae icteric: no   Extraocular movements intact: yes  GI:    Bowel sounds normal: yes   Tenderness: no    If yes, quadrant/location: not applicable   Palpable masses: no    If yes, quadrant/location: not applicable   Hepatosplenomegaly: no   Ascites: no   Hernia: no    If yes, type/location: not applicable   Surgical scars: yes    If yes, type/location: Pfannenstiel  laparoscopic port sites  not applicable  Resp:   Effort normal: yes   Breath sounds normal: yes    CV:   Regular rate and rhythm: yes   Heart sounds normal: yes   Femoral pulses normal: yes   Extremities edematous: no  Skin:   Rashes or lesions present: no    If yes, describe:not applicable   Jaundice:: no    Musculoskeletal:   Gait normal: yes   Strength normal: yes  Psych:   Oriented to person, place, and time: yes   Affect and mood normal: yes    Additional comments: not applicable    Diagnostics:  The following labs have been reviewed: NA  The following radiology images have been independently reviewed and interpreted: NA    Counseling: We provided Leanna García with a group education session today.  We discussed kidney transplantation at length with her, including risks, potential complications, and alternatives in the management of her renal failure.  The discussion included  complications related to anesthesia, bleeding, infection, primary nonfunction, and ATN.  I discussed the typical postoperative course, length of hospitalization, the need for long-term immunosuppression, and the need for long-term routine follow-up.  I discussed living-donor and -donor transplantation and the relative advantages and disadvantages of each.  I also discussed average waiting times for both living donation and  donation.  I discussed national and center-specific survival rates.  I also mentioned the potential benefit of multicenter listing to candidates listed with centers within more than one organ procurement organization.  All questions were answered.    Patient advised that it is recommended that all transplant candidates, and their close contacts and household members receive Covid vaccination.    Final determination of transplant candidacy will be made once evaluation is complete and reviewed by the Kidney & Kidney/Pancreas Selection Committee.    Coronavirus disease (COVID-19) caused by severe acute respiratory virus coronavirus 2 (SARS-C0V 2) is associated with increased mortality in solid organ transplant recipients (SOT) compared to non-transplant patients. Vaccine responses to vaccination are depressed against SARS-CoV2 compared to normal individuals but improve with third vaccination doses. Vaccination prior to SOT provides both the best opportunity for transplant candidates to develop protective immunity and to reduce the risk of serious COVID19 infections post transplantation. Organ transplant candidates at [facility] will be required to receive SARS-CoV-2 vaccination prior to being listed with a an active status, whenever possible. Exceptions will be made for disability related reasons or for sincerely held Latter day beliefs.          Transplant Surgery - Candidacy   Assessment/Plan:   Leanna García has end stage renal disease (ESRD)  on dialysis. I see no surgical contraindication to placing a kidney transplant. Based on available information, Leanna García is a suitable kidney transplant candidate.     Additional testing to be completed according to the Written Order Guidelines for Adult Pre-kidney and Pancreas Transplant Evaluation (KI-02).  Interpretation of tests and discussion of patient management involves all members of the multidisciplinary transplant team.    Edwin Ward MD

## 2022-03-10 NOTE — LETTER
March 11, 2022        Keyon Ramachandran  [address]  [address]  [facility]  [facility] [place] [address]  Phone: [phone]  Fax: [phone]             [facility]- Transplant 1st Fl  [address]  [facility] [address]  Phone: [phone]   Patient: Leanna García   MR Number: [id number]   YOB: 1965   Date of Visit: 3/10/2022       Dear Dr. Keyon Ramachandran    Thank you for referring Leanna García to me for evaluation. Attached you will find relevant portions of my assessment and plan of care.    If you have questions, please do not hesitate to call me. I look forward to following Leanna García along with you.    Sincerely,    Nafisa Ruff, NP    Enclosure    If you would like to receive this communication electronically, please contact [email or web address] or [phone] to request [employer] access.    [employer] is a tool which provides read-only access to select patient information with whom you have a relationship. Its easy to use and provides real time access to review your patients record including encounter summaries, notes, results, and demographic information.    If you feel you have received this communication in error or would no longer like to receive these types of communications, please e-mail [email or web address]

## 2022-03-10 NOTE — TELEPHONE ENCOUNTER
Order placed per provider. Patient notified under separate encounter of this additional testing request.  ----- Message from Raina Graff NP sent at 3/10/2022  2:09 PM CST -----  Monophasic waveforms in the external iliac arteries suggestive of hemodynamically significant upstream stenosis/atherosclerosis.    Needs vascular evaluation

## 2022-03-10 NOTE — PROGRESS NOTES
PHARM.D. PRE-TRANSPLANT NOTE:    This patient's medication therapy was evaluated as part of her pre-transplant evaluation.      The following general pharmacologic concerns were noted: None    The following concerns for post-operative pain management were noted: None    The following pharmacologic concerns related to HCV therapy were noted: None      This patient's medication profile was reviewed for considerations for DAA Hepatitis C therapy:    [X]  No current inducers of CYP 3A4 or PGP  [X]  No amiodarone on this patient's EMR profile in the last 24 months  [X]  No past or current atrial fibrillation on this patient's EMR profile       Current Outpatient Medications   Medication Sig Dispense Refill    amLODIPine (NORVASC) 10 MG tablet Take 10 mg by mouth once daily.      ascorbic acid, vitamin C, (VITAMIN C) 500 MG tablet Take 500 mg by mouth once daily.      atorvastatin (LIPITOR) 80 MG tablet Take 1 tablet (80 mg total) by mouth once daily. 90 tablet 3    blood sugar diagnostic Strp To check BG 4 times daily, to use with insurance preferred meter 450 strip 3    blood-glucose meter (ACCU-CHEK KAYLIE PLUS METER) Misc To use to check blood sugars 4 times a day 1 each 0    dextrose (GLUCOSE GEL) 40 % gel Take 37.5 mLs (15,000 mg total) by mouth once as needed (hypoglycemia). 37.5 g 4    doxercalciferoL (HECTOROL) 4 mcg/2 mL injection Inject 8 mcg into the vein 3 (three) times a week.      epoetin beta, methoxy peg (MIRCERA) 30 mcg/0.3 mL Syrg Inject 30 mcg as directed every 28 days.      ergocalciferol (VITAMIN D2) 50,000 unit Cap Take 50,000 Units by mouth every 30 days.      EScitalopram oxalate (LEXAPRO) 10 MG tablet Take 10 mg by mouth once daily.      ezetimibe (ZETIA) 10 mg tablet Take 10 mg by mouth once daily.      fenofibrate 160 MG Tab Take 1 tablet (160 mg total) by mouth once daily. 90 tablet 3    gabapentin (NEURONTIN) 300 MG capsule Take 300 mg by mouth every evening.      glucagon  "(BAQSIMI) 3 mg/actuation Spry 3 mg (one actuation) into a single nostril; if no response, may repeat in 15 minutes using a new intranasal device. 2 each 1    insulin aspart U-100 (NOVOLOG FLEXPEN U-100 INSULIN) 100 unit/mL (3 mL) InPn pen Inject 5-8 units 3 times per day with food. 1 Box 6    insulin detemir U-100 (LEVEMIR FLEXTOUCH U-100 INSULN) 100 unit/mL (3 mL) InPn pen Inject 15-18 Units into the skin once daily. 1 Box 6    lancets Misc To use to check blood sugar 4 times daily. To use with insurance approved meter. Patient needs the round, purple one 450 each 3    lancing device with lancets (ACCU-CHEK SOFT DEV LANCETS) Kit To check blood sugars 4 times per day 400 each 3    losartan (COZAAR) 25 MG tablet Take 25 mg by mouth once daily.       metoprolol tartrate (LOPRESSOR) 25 MG tablet Take 25 mg by mouth 2 (two) times daily.      minoxidiL (LONITEN) 2.5 MG tablet Take 5 mg by mouth 2 (two) times daily.      multivitamin (THERAGRAN) per tablet Take 1 tablet by mouth once daily.      pantoprazole (PROTONIX) 40 MG tablet Take 40 mg by mouth once daily.      pen needle, diabetic (BD ULTRA-FINE ROBERT PEN NEEDLE) 32 gauge x 5/32" Ndle To use 4 times per day with insulin injections. 450 each 2    sucroferric oxyhydroxide (VELPHORO) 500 mg Chew Take 500 mg by mouth 3 (three) times daily.       No current facility-administered medications for this visit.           I am available for consultation and can be contacted, as needed by the other members of the Transplant team.   "

## 2022-03-10 NOTE — PROGRESS NOTES
INITIAL PATIENT EDUCATION NOTE    Ms. Leanna García was seen in pre-kidney transplant clinic for evaluation for kidney, kidney/pancreas or pancreas only transplant.  The patient attended a an individual video education session that discussed/reviewed the following aspects of transplantation: evaluation and selection committee process, [facility] waitlist management/multiple listings, types of organs offered (KDPI < 85%, KDPI > 85%, PHS risk, DCD, HCV+, HIV+ for HIV+ recipients and enbloc/dual), financial aspects, surgical procedures, dietary instruction pre- and post-transplant, health maintenance pre- and post-transplant, post-transplant hospitalization and outpatient follow-up, potential to participate in a research protocol, and medication management and side effects.  A question and answer session was provided after the presentation.    The patient was seen by all members of the multi-disciplinary team to include: Nephrologist/PA, Surgeon, , Transplant Coordinator, , Pharmacist and Dietician (if applicable).    The patient reviewed and signed all consents for evaluation which were witnessed and sent to scanning into the EPIC chart.    The patient was given an education book and plan for further evaluation based on her individual assessment.      Reviewed program requirement for complete COVID vaccination with documentation prior to listing.  COVID education information reviewed with patient.     The patient was informed that the transplant team would manage immediate post op pain. If the patient requires long term pain management, they will need to have that pain management addressed by their PCP or previous provider who wrote for long term pain medicines.    The patient was encouraged to call with any questions or concerns.

## 2022-03-10 NOTE — PROGRESS NOTES
"Transplant Recipient Adult Psychosocial Assessment    Leanna García  [address]  [place] [address]  Telephone Information:   Mobile [phone]   Mobile [phone]   Home  [phone] (home)  Work  There is no work phone number on file.  E-mail  [email or web address]    Sex: female  YOB: 1965  Age: 56 y.o.    Encounter Date: 3/10/2022  [place] Citizen: yes  Primary Language: English   Needed: no    Emergency Contact:  Donovan García, 62 yo , Joanie DEE, does drive/own car, retired/disabled disel . [phone]  Sonia and Rasheedkeith Cota', 78 and 79 yo parents, Bismark HERNANDEZ, both drive, retired. Home: [phone]  Cell: [phone]    Family/Social Support:   Number of dependents/: pt denies  Marital history: pt reports  to Donovan 37 years  Other family dynamics: Pt reports having good support system of friend/neighbors and /sister. Pt reports  Donovan will be primary transplant caregiver. Pt reports (sister) Alison Rossi, (close friend) Iliana Horneo, (close friend) Catalina Moreno and (long time family friend) Marleny Abreu will all be available to assist with organ transplant as needed. Pt reports is not in contact with her son Donovan García Jr at all due to 's previous drug use and a "falling out";  will not be assisting with transplant at all.    Household Composition:  Donovan García, 62 yo , Joanie DEE, does drive/own car, retired/disabled disel . [phone]    Do you and your caregivers have access to reliable transportation? yes  PRIMARY CAREGIVER: Donovan García , will be primary caregiver, phone number  [phone]     provided in-depth information to patient and caregiver regarding pre- and post-transplant caregiver role.   strongly encourages patient and caregiver to have concrete plan regarding post-transplant care giving, including back-up caregiver(s) to ensure care " giving needs are met as needed.    Patient and Caregiver states understanding all aspects of caregiver role/commitment and is able/willing/committed to being caregiver to the fullest extent necessary.    Patient and Caregiver verbalizes understanding of the education provided today and caregiver responsibilities.         remains available. Patient and Caregiver agree to contact  in a timely manner if concerns arise.      Able to take time off work without financial concerns: no.     Additional Significant Others who will Assist with Transplant:  Alison Varghese, 51 yo sister, Donna HERNANDEZ, does drive/own car. [phone]  Iliana Chou, 73 yo close friend (neighbor), does drive/own car. [phone]  Catalina Moreno, 60 yo close friend (co-worker from [employer]), does drive/own car. Home: [phone] Cell: [phone]  Marleny Cheema, 56 yo close friend (long time family/school friend), does drive/own car. [phone]    Living Will: no  Healthcare Power of : no  Advance Directives on file: <<no information> per medical record.  Verbally reviewed LW/HCPA information.   provided patient with copy of LW/HCPA documents and provided education on completion of forms.    Living Donors: Education and resource information given to patient.    Highest Education Level: High School (9-12) or GED  Reading Ability: 12th grade  Reports difficulty with: N/A  Learns Best By:  multisensory     Status: no  VA Benefits: no     Working for Income: No  If no, reason not working: Disability  Patient reports disability 2008 due to back problems, flat feet and problems walking. Pt reports usually walker. Pt reports ESRD and is on dialysis.    Spouse/Significant Other Employment: Pt reports  is retired/disabled [employer] since 2021 due to back problems and hip replacement.    Disabled: yes 2008 due to back problems, flat feet and problems walking. Pt reports ESRD   and is on dialysis.    Monthly Income:  Pt's disability: $887 and 's shelter/disability: $2262  Able to afford all costs now and if transplanted, including medications: yes  Patient and Caregiver verbalizes understanding of personal responsibilities related to transplant costs and the importance of having a financial plan to ensure that patients transplant costs are fully covered.       provided fundraising information/education. Patient and Caregiververbalizes understanding.   remains available.    Insurance:   Payer/Plan Subscr  Sex Relation Sub. Ins. ID Effective Group Num   1. HU[employer] MANAGE* LELA DEL CASTILLO 1965 Female Self [id number] 1/1/15 [id number]                                   [address] [address]     Primary Insurance (for UNOS reporting): Public Insurance - Medicare & Choice  Secondary Insurance (for UNOS reporting): None  Patient and Caregiver verbalizes clear understanding that patient may experience difficulty obtaining and/or be denied insurance coverage post-surgery. This includes and is not limited to disability insurance, life insurance, health insurance, burial insurance, long term care insurance, and other insurances.      Patient and Caregiver also reports understanding that future health concerns related to or unrelated to transplantation may not be covered by patient's insurance.  Resources and information provided and reviewed.     Patient and Caregiver provides verbal permission to release any necessary information to outside resources for patient care and discharge planning.  Resources and information provided are reviewed.      [facility], [phone]. Hemodialysis: MWF 3.5 hours    Dialysis Adherence: Patient and Caregiver reports having high dialysis compliance with treatments and instructions within last 3 months.  Dialysis compliance update requested.    Infusion Service: patient utilizing? yes iron and B12  Home Health:  patient utilizing? no  DME: yes walker, glucometer  Pulmonary/Cardiac Rehab: pt denies   ADLS: Pt reports is independent with self care and medication management. Pt reports does not drive -- family assists with all transportation.     Adherence:   Pt reports high medical compliance with appointments and instructions within last 3 months. Adherence education and counseling provided.     Per History Section:  Past Medical History:   Diagnosis Date    A-fib     resolved per patient    Arthritis     Bronchitis     Cardiovascular event risk, ASCVD 10-year risk 6.7% 10/15/2016    Diabetes mellitus     Diabetes mellitus type II     Disorder of kidney and ureter     Encounter for blood transfusion     History of colon polyps 11/02/2016    Hyperlipidemia     Hypertension     Stroke      Social History     Tobacco Use    Smoking status: Never Smoker    Smokeless tobacco: Never Used   Substance Use Topics    Alcohol use: No     Social History     Substance and Sexual Activity   Drug Use No     Social History     Substance and Sexual Activity   Sexual Activity Yes    Partners: Male       Per Today's Psychosocial:  Tobacco: none, patient denies any use.  Alcohol: none, patient denies any use.  Illicit Drugs/Non-prescribed Medications: none, patient denies any use.    Patient and Caregiver states clear understanding of the potential impact of substance use as it relates to transplant candidacy and is aware of possible random substance screening.  Substance abstinence/cessation counseling, education and resources provided and reviewed.     Arrests/DWI/Treatment/Rehab: patient denies    Psychiatric History:    Mental Health: Pt reports has struggled with depression and anxiety in the past due to having to cope with multiple medical problems and losses. Pt reports PCP prescribes Lexapro 10mg and patient reports it has helped with mood.   Psychiatrist/Counselor: pt denies  Medications:  Lexapro 10mg PCP  prescribes  Suicide/Homicide Issues: pt denies any si/hi history   Safety at home: pt reports living in safe home environment with no abuse.    Knowledge: Patient and Caregiver states having clear understanding and realistic expectations regarding the potential risks and potential benefits of organ transplantation and organ donation and agrees to discuss with health care team members and support system members, as well as to utilize available resources and express questions and/or concerns in order to further facilitate the pt informed decision-making.  Resources and information provided and reviewed.    Patient and Caregiver is aware of [facility]'s affiliation and/or partnership with agencies in home health care, LTAC, SNF, [facility], and other hospitals and clinics.    Understanding: Patient and Caregiver reports having a clear understanding of the many lifetime commitments involved with being a transplant recipient, including costs, compliance, medications, lab work, procedures, appointments, concrete and financial planning, preparedness, timely and appropriate communication of concerns, abstinence (ETOH, tobacco, illicit non-prescribed drugs), adherence to all health care team recommendations, support system and caregiver involvement, appropriate and timely resource utilization and follow-through, mental health counseling as needed/recommended, and patient and caregiver responsibilities.  Social Service Handbook, resources and detailed educational information provided and reviewed.  Educational information provided.    Patient and Caregiver also reports current and expected compliance with health care regime and states having a clear understanding of the importance of compliance.      Patient and Caregiver reports a clear understanding that risks and benefits may be involved with organ transplantation and with organ donation.       Patient and Caregiver also reports clear understanding that psychosocial risk factors may  affect patient, and include but are not limited to feelings of depression, generalized anxiety, anxiety regarding dependence on others, post traumatic stress disorder, feelings of guilt and other emotional and/or mental concerns, and/or exacerbation of existing mental health concerns.  Detailed resources provided and discussed.      Patient and Caregiver agrees to access appropriate resources in a timely manner as needed and/or as recommended, and to communicate concerns appropriately.  Patient and Caregiver also reports a clear understanding of treatment options available.     Patient and Caregiver received education in a group setting.   reviewed education, provided additional information, and answered questions.    Feelings or Concerns: Pt reports high motivation to pursue organ transplant at this time.    Coping: Identify Patient & Caregiver Strategies to [place]:   1. Currently & Pre-transplant - family and friend support; loves taking care of and walking her new puppy Jose-Jose a tatum zamora.   2. At the time of surgery - family and friend support   3. During post-Transplant & Recovery Period - family and friend support    Goals: Pt reports hope for successful organ transplant so that she may have a happier healthier life. Patient referred to Vocational Rehabilitation.    Interview Behavior: Patient and Caregiver presents as alert and oriented x 4, pleasant, good eye contact, well groomed, recall good, concentration/judgement good, average intelligence, calm, communicative, cooperative and asking and answering questions appropriately. Pt's supportive  Donovan García in session with patient's permission.         Transplant Social Work - Candidacy  Assessment/Plan:     Psychosocial Suitability: Patient presents as low risk candidate for kidney transplant at this time. Based on psychosocial risk factors, patient presents as low risk, due to patient denying psychosocial barriers to kidney  organ transplant.  Pt reports having organ transplant caregiver/transportation plan, medical insurance plan, and plan to afford transplant costs all in place.      Recommendations/Additional Comments: Dialysis compliance update requested.     SW recommends that pt conduct fundraising to assist pt with pay for cost of medications, food, gas, and other transplant related needs. SW recommends that pt remain aware of potential mental health concerns and contact the team if any concerns arise. SW recommends that pt remain abstinent from tobacco, ETOH, and drug use. SW supports pt's continued adherence. SW remains available to answer any questions or concerns that arise as the pt moves through the transplant process.     Final determination of transplant candidacy will be reviewed by the selection committee.     Maria Elena ERICKSONW

## 2022-03-14 ENCOUNTER — PATIENT OUTREACH (OUTPATIENT)
Dept: ADMINISTRATIVE | Facility: HOSPITAL | Age: 57
End: 2022-03-14
Payer: MEDICARE

## 2022-03-16 ENCOUNTER — TELEPHONE (OUTPATIENT)
Dept: TRANSPLANT | Facility: CLINIC | Age: 57
End: 2022-03-16
Payer: MEDICARE

## 2022-03-16 DIAGNOSIS — N28.89 RENAL CALCIFICATION: ICD-10-CM

## 2022-03-16 DIAGNOSIS — R93.89 ABNORMAL X-RAY EXAMINATION: Primary | ICD-10-CM

## 2022-03-16 DIAGNOSIS — Z76.82 ORGAN TRANSPLANT CANDIDATE: ICD-10-CM

## 2022-03-16 DIAGNOSIS — R26.89 DECREASED FUNCTIONAL MOBILITY: ICD-10-CM

## 2022-03-16 NOTE — TELEPHONE ENCOUNTER
----- Message from Otto Luu Jr., MD sent at 3/16/2022 12:40 PM CDT -----  Severe Iliac calcifications are present and waveforms are monophasic.   Needs CT noncon pelvis

## 2022-03-16 NOTE — TELEPHONE ENCOUNTER
Follow up RR 3/10/22, patient notified via VM of additional testing needed: vascular consult and CT pelvis without contrast. Patient to have colonoscopy, mammogram, pap/gyn exam, 6MWT scheduled as well. Patient scheduled for cardiology testing: consult, echo, stress on 4/5/22 as well. Phone number provided for patient to call with any questions. Orders per provider.

## 2022-03-16 NOTE — TELEPHONE ENCOUNTER
----- Message from Edgar Sanchez sent at 3/16/2022  2:23 PM CDT -----  Regarding: schedule appt  Patient calling to schedule appt.     Call: [phone] (Mobile

## 2022-03-16 NOTE — TELEPHONE ENCOUNTER
Order per provider, patient notified of request for additional imaging and referral to vascular specialist, under separate encounter.  ----- Message from Otto Luu Jr., MD sent at 3/16/2022 12:39 PM CDT -----  Severe Iliac calcifications are present and waveforms are monophasic.     Need noncon CT pelvis

## 2022-03-17 ENCOUNTER — TELEPHONE (OUTPATIENT)
Dept: TRANSPLANT | Facility: CLINIC | Age: 57
End: 2022-03-17
Payer: MEDICARE

## 2022-03-18 ENCOUNTER — DOCUMENTATION ONLY (OUTPATIENT)
Dept: TRANSPLANT | Facility: CLINIC | Age: 57
End: 2022-03-18
Payer: MEDICARE

## 2022-03-23 ENCOUNTER — TELEPHONE (OUTPATIENT)
Dept: TRANSPLANT | Facility: CLINIC | Age: 57
End: 2022-03-23
Payer: MEDICARE

## 2022-03-23 NOTE — TELEPHONE ENCOUNTER
Patient inquiring about 2 letters that were sent to her. She needs a colonoscopy, department number provided on letter. Patient instructed to call to schedule. Patient also received a letter for Endocrine follow up A1c >14. Patient stated that she has an endocrinologist. Patient instructed to follow up with them. Patient informed that her A1c will need to be less than 9 . Patient voiced understanding.   ----- Message from Ata Macias sent at 3/23/2022 10:03 AM CDT -----  Regarding: call back  Pt call to speak with Keila in regards to some question she has    Call

## 2022-03-24 ENCOUNTER — TELEPHONE (OUTPATIENT)
Dept: GASTROENTEROLOGY | Facility: CLINIC | Age: 57
End: 2022-03-24
Payer: MEDICARE

## 2022-03-24 ENCOUNTER — SOCIAL WORK (OUTPATIENT)
Dept: TRANSPLANT | Facility: CLINIC | Age: 57
End: 2022-03-24
Payer: MEDICARE

## 2022-03-24 ENCOUNTER — TELEPHONE (OUTPATIENT)
Dept: TRANSPLANT | Facility: CLINIC | Age: 57
End: 2022-03-24
Payer: MEDICARE

## 2022-03-24 DIAGNOSIS — Z76.82 ORGAN TRANSPLANT CANDIDATE: Primary | ICD-10-CM

## 2022-03-24 NOTE — TELEPHONE ENCOUNTER
Spoke w/pt regarding needed colonoscopy. Pt wants to have procedure done in [facility] vs [facility]. I informed pt that I will get the nurse to place a GI order in and I'll call her back to schedule. Pt verbalized understanding. Secure chat message sent to Keila for order.

## 2022-03-24 NOTE — PROGRESS NOTES
[facility] [place], [phone]. Hemodialysis: MWF 3.5 hours    3-15-22 completed dialysis compliance update form shows patient in past 3 months  Current dry weight:  94.5    Most recent pre treatment weight:  98.10  3-15-22  AMA:  2-27- (20 mins -- transport); 1-26-22 (30 mins - - discomfort)  No Shows:  0  Last intact   2-1-22  No concerns with labs, caregivers, transportation, or psychosocial.    Maria Elena Marks MSW LCSW

## 2022-03-24 NOTE — TELEPHONE ENCOUNTER
----- Message from Juvenal Coleman MA sent at 3/24/2022 12:21 PM CDT -----  The attached pt needs a GI consult for a colonoscopy. Transplant wkup pt       Thanks  Juvenal  [id number]

## 2022-03-24 NOTE — TELEPHONE ENCOUNTER
----- Message from Anita Charles sent at 3/24/2022  9:45 AM CDT -----  Regarding: Speak with office  Contact: Leanna Gibson is calling to get scheduled for a colonoscopy in [place] and would like a call for further assistance.    [phone]

## 2022-03-25 ENCOUNTER — TELEPHONE (OUTPATIENT)
Dept: GASTROENTEROLOGY | Facility: CLINIC | Age: 57
End: 2022-03-25
Payer: MEDICARE

## 2022-03-25 DIAGNOSIS — Z86.010 HISTORY OF COLON POLYPS: Primary | ICD-10-CM

## 2022-03-25 NOTE — TELEPHONE ENCOUNTER
Colonoscopy 4/26 at 1030am arrive for 930am instructions reviewed and patient states understanding. golytely instructions mailed to patient

## 2022-04-01 ENCOUNTER — TELEPHONE (OUTPATIENT)
Dept: CARDIOLOGY | Facility: HOSPITAL | Age: 57
End: 2022-04-01
Payer: MEDICARE

## 2022-04-04 ENCOUNTER — PATIENT OUTREACH (OUTPATIENT)
Dept: ADMINISTRATIVE | Facility: OTHER | Age: 57
End: 2022-04-04
Payer: MEDICARE

## 2022-04-04 NOTE — PROGRESS NOTES
Requested updates within Care Everywhere.  Patient's chart was reviewed for overdue DEBBIE topics.  Health maintenance:updated  Immunizations:reconciled   Legacy:   Media:  Orders placed:  Tasked appts:  Labs Linked:  Upcoming appt:Colonoscopy 4/26/22, Mammogram 5/5/22

## 2022-04-05 ENCOUNTER — HOSPITAL ENCOUNTER (OUTPATIENT)
Dept: PULMONOLOGY | Facility: CLINIC | Age: 57
Discharge: HOME | End: 2022-04-05
Payer: MEDICARE

## 2022-04-05 ENCOUNTER — OFFICE VISIT (OUTPATIENT)
Dept: CARDIOLOGY | Facility: CLINIC | Age: 57
End: 2022-04-05
Payer: MEDICARE

## 2022-04-05 ENCOUNTER — HOSPITAL ENCOUNTER (OUTPATIENT)
Dept: CARDIOLOGY | Facility: HOSPITAL | Age: 57
Discharge: HOME | End: 2022-04-05
Attending: NURSE PRACTITIONER
Payer: MEDICARE

## 2022-04-05 ENCOUNTER — TELEPHONE (OUTPATIENT)
Dept: CARDIOLOGY | Facility: CLINIC | Age: 57
End: 2022-04-05
Payer: MEDICARE

## 2022-04-05 VITALS
SYSTOLIC BLOOD PRESSURE: 196 MMHG | HEART RATE: 75 BPM | HEIGHT: 67 IN | WEIGHT: 208 LBS | BODY MASS INDEX: 32.65 KG/M2 | DIASTOLIC BLOOD PRESSURE: 70 MMHG

## 2022-04-05 VITALS
HEIGHT: 67 IN | WEIGHT: 208 LBS | BODY MASS INDEX: 32.65 KG/M2 | RESPIRATION RATE: 18 BRPM | HEART RATE: 82 BPM | SYSTOLIC BLOOD PRESSURE: 196 MMHG | DIASTOLIC BLOOD PRESSURE: 70 MMHG

## 2022-04-05 VITALS — HEIGHT: 68 IN | WEIGHT: 208 LBS | BODY MASS INDEX: 31.52 KG/M2

## 2022-04-05 VITALS
WEIGHT: 205 LBS | DIASTOLIC BLOOD PRESSURE: 59 MMHG | HEART RATE: 83 BPM | HEIGHT: 68 IN | BODY MASS INDEX: 31.07 KG/M2 | SYSTOLIC BLOOD PRESSURE: 101 MMHG

## 2022-04-05 DIAGNOSIS — N18.6 ESRD (END STAGE RENAL DISEASE): Primary | ICD-10-CM

## 2022-04-05 DIAGNOSIS — Z76.82 ORGAN TRANSPLANT CANDIDATE: ICD-10-CM

## 2022-04-05 DIAGNOSIS — E78.5 HYPERLIPIDEMIA, UNSPECIFIED HYPERLIPIDEMIA TYPE: ICD-10-CM

## 2022-04-05 DIAGNOSIS — R26.89 DECREASED FUNCTIONAL MOBILITY: ICD-10-CM

## 2022-04-05 DIAGNOSIS — E11.59 HYPERTENSION ASSOCIATED WITH DIABETES: ICD-10-CM

## 2022-04-05 DIAGNOSIS — I15.2 HYPERTENSION ASSOCIATED WITH DIABETES: ICD-10-CM

## 2022-04-05 LAB
ASCENDING AORTA: 3.6 CM
AV INDEX (PROSTH): 0.73
AV MEAN GRADIENT: 15 MMHG
AV PEAK GRADIENT: 37 MMHG
AV VALVE AREA: 2.37 CM2
AV VELOCITY RATIO: 0.61
BSA FOR ECHO PROCEDURE: 2.11 M2
CV ECHO LV RWT: 0.42 CM
CV PHARM DOSE: 0.4 MG
CV STRESS BASE HR: 70 BPM
DIASTOLIC BLOOD PRESSURE: 78 MMHG
DOP CALC AO PEAK VEL: 3.04 M/S
DOP CALC AO VTI: 52.46 CM
DOP CALC LVOT AREA: 3.3 CM2
DOP CALC LVOT DIAMETER: 2.04 CM
DOP CALC LVOT PEAK VEL: 1.84 M/S
DOP CALC LVOT STROKE VOLUME: 124.37 CM3
DOP CALCLVOT PEAK VEL VTI: 38.07 CM
E WAVE DECELERATION TIME: 247.61 MSEC
E/A RATIO: 0.83
E/E' RATIO: 10.27 M/S
ECHO LV POSTERIOR WALL: 0.93 CM (ref 0.6–1.1)
EJECTION FRACTION- HIGH: 65 %
EJECTION FRACTION: 65 %
END DIASTOLIC INDEX-HIGH: 153 ML/M2
END DIASTOLIC INDEX-LOW: 93 ML/M2
END SYSTOLIC INDEX-HIGH: 71 ML/M2
END SYSTOLIC INDEX-LOW: 31 ML/M2
FRACTIONAL SHORTENING: 40 % (ref 28–44)
INTERVENTRICULAR SEPTUM: 0.7 CM (ref 0.6–1.1)
IVRT: 65.65 MSEC
LA MAJOR: 5.57 CM
LA MINOR: 5.58 CM
LA WIDTH: 4.88 CM
LEFT ATRIUM SIZE: 4.24 CM
LEFT ATRIUM VOLUME INDEX MOD: 44.4 ML/M2
LEFT ATRIUM VOLUME INDEX: 47.6 ML/M2
LEFT ATRIUM VOLUME MOD: 91.42 CM3
LEFT ATRIUM VOLUME: 98.05 CM3
LEFT INTERNAL DIMENSION IN SYSTOLE: 2.64 CM (ref 2.1–4)
LEFT VENTRICLE DIASTOLIC VOLUME INDEX: 42.65 ML/M2
LEFT VENTRICLE DIASTOLIC VOLUME: 87.86 ML
LEFT VENTRICLE MASS INDEX: 54 G/M2
LEFT VENTRICLE SYSTOLIC VOLUME INDEX: 12.4 ML/M2
LEFT VENTRICLE SYSTOLIC VOLUME: 25.63 ML
LEFT VENTRICULAR INTERNAL DIMENSION IN DIASTOLE: 4.4 CM (ref 3.5–6)
LEFT VENTRICULAR MASS: 112.15 G
LV LATERAL E/E' RATIO: 9.63 M/S
LV SEPTAL E/E' RATIO: 11 M/S
MV A" WAVE DURATION": 18.55 MSEC
MV PEAK A VEL: 0.93 M/S
MV PEAK E VEL: 0.77 M/S
MV STENOSIS PRESSURE HALF TIME: 71.81 MS
MV VALVE AREA P 1/2 METHOD: 3.06 CM2
OHS CV CPX 1 MINUTE RECOVERY HEART RATE: 83 BPM
OHS CV CPX 85 PERCENT MAX PREDICTED HEART RATE MALE: 133
OHS CV CPX MAX PREDICTED HEART RATE: 157
OHS CV CPX PATIENT IS FEMALE: 1
OHS CV CPX PATIENT IS MALE: 0
OHS CV CPX PEAK DIASTOLIC BLOOD PRESSURE: 78 MMHG
OHS CV CPX PEAK HEAR RATE: 75 BPM
OHS CV CPX PEAK RATE PRESSURE PRODUCT: NORMAL
OHS CV CPX PEAK SYSTOLIC BLOOD PRESSURE: 191 MMHG
OHS CV CPX PERCENT MAX PREDICTED HEART RATE ACHIEVED: 48
OHS CV CPX RATE PRESSURE PRODUCT PRESENTING: NORMAL
PISA TR MAX VEL: 2.97 M/S
PULM VEIN S/D RATIO: 1.2
PV PEAK D VEL: 0.55 M/S
PV PEAK S VEL: 0.66 M/S
RA MAJOR: 4.27 CM
RA PRESSURE: 3 MMHG
RA WIDTH: 3.82 CM
RETIRED EF AND QEF - SEE NOTES: 53 %
RIGHT VENTRICULAR END-DIASTOLIC DIMENSION: 3.5 CM
RV TISSUE DOPPLER FREE WALL SYSTOLIC VELOCITY 1 (APICAL 4 CHAMBER VIEW): 15.94 CM/S
SINUS: 3.24 CM
STJ: 2.88 CM
SYSTOLIC BLOOD PRESSURE: 191 MMHG
TDI LATERAL: 0.08 M/S
TDI SEPTAL: 0.07 M/S
TDI: 0.08 M/S
TR MAX PG: 35 MMHG
TRICUSPID ANNULAR PLANE SYSTOLIC EXCURSION: 2.74 CM
TV REST PULMONARY ARTERY PRESSURE: 38 MMHG

## 2022-04-05 PROCEDURE — 93018 STRESS TEST WITH MYOCARDIAL PERFUSION (CUPID ONLY): ICD-10-PCS | Mod: TXP,,, | Performed by: INTERNAL MEDICINE

## 2022-04-05 PROCEDURE — 99999 PR PBB SHADOW E&M-EST. PATIENT-LVL III: CPT | Mod: PBBFAC,GC,TXP, | Performed by: INTERNAL MEDICINE

## 2022-04-05 PROCEDURE — 93306 ECHO (CUPID ONLY): ICD-10-PCS | Mod: 26,TXP,, | Performed by: INTERNAL MEDICINE

## 2022-04-05 PROCEDURE — 94618 PULMONARY STRESS TESTING: ICD-10-PCS | Mod: S$GLB,TXP,, | Performed by: INTERNAL MEDICINE

## 2022-04-05 PROCEDURE — 99204 PR OFFICE/OUTPT VISIT, NEW, LEVL IV, 45-59 MIN: ICD-10-PCS | Mod: GC,S$GLB,TXP, | Performed by: INTERNAL MEDICINE

## 2022-04-05 PROCEDURE — 93016 CV STRESS TEST SUPVJ ONLY: CPT | Mod: TXP,,, | Performed by: INTERNAL MEDICINE

## 2022-04-05 PROCEDURE — A9502 TC99M TETROFOSMIN: HCPCS | Mod: TXP

## 2022-04-05 PROCEDURE — 63600175 PHARM REV CODE 636 W HCPCS: Mod: TXP | Performed by: NURSE PRACTITIONER

## 2022-04-05 PROCEDURE — 78452 STRESS TEST WITH MYOCARDIAL PERFUSION (CUPID ONLY): ICD-10-PCS | Mod: 26,TXP,, | Performed by: INTERNAL MEDICINE

## 2022-04-05 PROCEDURE — 78452 HT MUSCLE IMAGE SPECT MULT: CPT | Mod: 26,TXP,, | Performed by: INTERNAL MEDICINE

## 2022-04-05 PROCEDURE — 99204 OFFICE O/P NEW MOD 45 MIN: CPT | Mod: GC,S$GLB,TXP, | Performed by: INTERNAL MEDICINE

## 2022-04-05 PROCEDURE — 94618 PULMONARY STRESS TESTING: CPT | Mod: S$GLB,TXP,, | Performed by: INTERNAL MEDICINE

## 2022-04-05 PROCEDURE — 93016 STRESS TEST WITH MYOCARDIAL PERFUSION (CUPID ONLY): ICD-10-PCS | Mod: TXP,,, | Performed by: INTERNAL MEDICINE

## 2022-04-05 PROCEDURE — 93018 CV STRESS TEST I&R ONLY: CPT | Mod: TXP,,, | Performed by: INTERNAL MEDICINE

## 2022-04-05 PROCEDURE — 93306 TTE W/DOPPLER COMPLETE: CPT | Mod: TXP

## 2022-04-05 PROCEDURE — 99999 PR PBB SHADOW E&M-EST. PATIENT-LVL III: ICD-10-PCS | Mod: PBBFAC,GC,TXP, | Performed by: INTERNAL MEDICINE

## 2022-04-05 PROCEDURE — 93306 TTE W/DOPPLER COMPLETE: CPT | Mod: 26,TXP,, | Performed by: INTERNAL MEDICINE

## 2022-04-05 RX ORDER — REGADENOSON 0.08 MG/ML
0.4 INJECTION, SOLUTION INTRAVENOUS ONCE
Status: COMPLETED | OUTPATIENT
Start: 2022-04-05 | End: 2022-04-05

## 2022-04-05 RX ORDER — AMINOPHYLLINE 25 MG/ML
75 INJECTION, SOLUTION INTRAVENOUS ONCE
Status: COMPLETED | OUTPATIENT
Start: 2022-04-05 | End: 2022-04-05

## 2022-04-05 RX ADMIN — AMINOPHYLLINE 75 MG: 25 INJECTION, SOLUTION INTRAVENOUS at 08:04

## 2022-04-05 RX ADMIN — REGADENOSON 0.4 MG: 0.08 INJECTION, SOLUTION INTRAVENOUS at 08:04

## 2022-04-05 NOTE — PROCEDURES
Leanna García is a 56 y.o.  female patient, who presents for a 6 minute walk test ordered by JESS Graff.  The diagnosis is  (Pre kidney transplant).  The patient's BMI is 31.6 kg/m2.  Predicted distance (lower limit of normal) is 354.34 meters.      Test Results:    The test was completed without stopping.    The total time walked was 360 seconds.  During walking, the patient reported:  No complaints. The patient used a walker  during testing.     04/05/2022---------Distance: 335.28 meters (1100 feet)     O2 Sat % Supplemental Oxygen Heart Rate Blood Pressure Bernabe Scale   Pre-exercise  (Resting) 99 % Room Air 81 bpm 132/70 mmHg 0   During Exercise 100 % Room Air 92 bpm 196/80 mmHg 2   Post-exercise  (Recovery) 100 % Room Air  80 bpm 161/76 mmHg       Recovery Time: 362 seconds    Performing nurse/tech: Malou HUANG      PREVIOUS STUDY:   The patient has not had a previous study.      CLINICAL INTERPRETATION:  Six minute walk distance is 335.28 meters (1100 feet) with light dyspnea.  During exercise, there was no significant desaturation while breathing room air.  Blood pressure increased significantly and Heart rate remained stable with walking.  This may represent a hypertensive response to exercise.  The patient did not report non-pulmonary symptoms during exercise.  No previous study performed.  Based upon age and body mass index, exercise capacity is less than predicted.

## 2022-04-05 NOTE — TELEPHONE ENCOUNTER
Found pt in card visit w. 22g PIV to rt wrist. PIV discontinued without complications, no bleeding at site, no redness/drainage/ swelling in area. Procedure well tolerated.

## 2022-04-05 NOTE — PROGRESS NOTES
Cardiology Clinic Note  Reason for Visit: Pre-kidney Transplant Evaluation    HPI:   Ms García is a 57 yo F with PMH ESRD on HD, DM2, HTN, HLD presenting as a referral from renal transplant for cardiac risk stratification prior to potential kidney transplant. She has been on HD since 9/2018 and her renal disease is secondary to DM2 and HTN. Overall, she has been feeling well. She uses a walker from time to time, especially after HD when she feels weak. No falls or syncope. No chest discomfort, SOB/DUQUE, or palps at rest or with ambulation. No Hx of CAD and reports a normal stress test years ago. No issues with 6MWT today. DM2 has been poorly controlled though and follows with Endocrinology.    ROS:    Constitution: Negative for fever, chills, weight loss or gain.   HENT: Negative for sore throat, rhinorrhea, or headache.  Eyes: Negative for blurred or double vision.   Cardiovascular: See above  Pulmonary: Negative for SOB   Gastrointestinal: Negative for abdominal pain, nausea, vomiting, or diarrhea.   : Negative for dysuria.   Neurological: Negative for focal weakness or sensory changes.  PMH:     Past Medical History:   Diagnosis Date    A-fib     resolved per patient    Arthritis     Bronchitis     Cardiovascular event risk, ASCVD 10-year risk 6.7% 10/15/2016    Diabetes mellitus     Diabetes mellitus type II     Disorder of kidney and ureter     Encounter for blood transfusion     History of colon polyps 11/02/2016    Hyperlipidemia     Hypertension     Stroke      Past Surgical History:   Procedure Laterality Date    COLONOSCOPY N/A 10/5/2016    Procedure: COLONOSCOPY;  Surgeon: Pillo Chanel MD;  Location: [facility];  Service: Endoscopy;  Laterality: N/A;    HERNIA REPAIR Bilateral 11/22/2016    inguinal    HYSTERECTOMY      OOPHORECTOMY       Allergies:     Review of patient's allergies indicates:   Allergen Reactions    Vancomycin Itching    Chlorhexidine Itching     Medications:      Current Outpatient Medications on File Prior to Visit   Medication Sig Dispense Refill    amLODIPine (NORVASC) 10 MG tablet Take 10 mg by mouth once daily.      ascorbic acid, vitamin C, (VITAMIN C) 500 MG tablet Take 500 mg by mouth once daily.      atorvastatin (LIPITOR) 80 MG tablet Take 1 tablet (80 mg total) by mouth once daily. 90 tablet 3    blood sugar diagnostic Strp To check BG 4 times daily, to use with insurance preferred meter 450 strip 3    blood-glucose meter (ACCU-CHEK KAYLIE PLUS METER) Misc To use to check blood sugars 4 times a day 1 each 0    doxercalciferoL (HECTOROL) 4 mcg/2 mL injection Inject 8 mcg into the vein 3 (three) times a week.      epoetin beta, methoxy peg (MIRCERA) 30 mcg/0.3 mL Syrg Inject 30 mcg as directed every 28 days.      ergocalciferol (ERGOCALCIFEROL) 50,000 unit Cap Take 50,000 Units by mouth every 30 days.      EScitalopram oxalate (LEXAPRO) 10 MG tablet Take 10 mg by mouth once daily.      ezetimibe (ZETIA) 10 mg tablet Take 10 mg by mouth once daily.      fenofibrate 160 MG Tab Take 1 tablet (160 mg total) by mouth once daily. 90 tablet 3    gabapentin (NEURONTIN) 300 MG capsule Take 300 mg by mouth every evening.      glucagon (BAQSIMI) 3 mg/actuation Spry 3 mg (one actuation) into a single nostril; if no response, may repeat in 15 minutes using a new intranasal device. 2 each 1    insulin aspart U-100 (NOVOLOG FLEXPEN U-100 INSULIN) 100 unit/mL (3 mL) InPn pen Inject 5-8 units 3 times per day with food. 1 Box 6    insulin detemir U-100 (LEVEMIR FLEXTOUCH U-100 INSULN) 100 unit/mL (3 mL) InPn pen Inject 15-18 Units into the skin once daily. 1 Box 6    lancets Misc To use to check blood sugar 4 times daily. To use with insurance approved meter. Patient needs the round, purple one 450 each 3    lancing device with lancets (ACCU-CHEK SOFT DEV LANCETS) Kit To check blood sugars 4 times per day 400 each 3    losartan (COZAAR) 25 MG tablet Take 25 mg by  "mouth once daily.       metoprolol tartrate (LOPRESSOR) 25 MG tablet Take 25 mg by mouth 2 (two) times daily.      minoxidiL (LONITEN) 2.5 MG tablet Take 5 mg by mouth 2 (two) times daily.      multivitamin (THERAGRAN) per tablet Take 1 tablet by mouth once daily.      pantoprazole (PROTONIX) 40 MG tablet Take 40 mg by mouth once daily.      pen needle, diabetic (BD ULTRA-FINE ROBERT PEN NEEDLE) 32 gauge x 5/32" Ndle To use 4 times per day with insulin injections. 450 each 2    sucroferric oxyhydroxide (VELPHORO) 500 mg Chew Take 500 mg by mouth 3 (three) times daily.      dextrose (GLUCOSE GEL) 40 % gel Take 37.5 mLs (15,000 mg total) by mouth once as needed (hypoglycemia). 37.5 g 4    [DISCONTINUED] clorazepate (TRANXENE) 3.75 MG Tab Take 7.5 mg by mouth nightly as needed.        Current Facility-Administered Medications on File Prior to Visit   Medication Dose Route Frequency Provider Last Rate Last Admin    [COMPLETED] aminophylline injection 75 mg  75 mg Intravenous Once Raina Graff, NP   75 mg at 04/05/22 0828    [COMPLETED] aminophylline injection 75 mg  75 mg Intravenous Once Raina Graff, NP   75 mg at 04/05/22 0825    [COMPLETED] regadenoson injection 0.4 mg  0.4 mg Intravenous Once Raina Graff, NP   0.4 mg at 04/05/22 0822     Social History:     Social History     Tobacco Use    Smoking status: Never Smoker    Smokeless tobacco: Never Used   Substance Use Topics    Alcohol use: No     Family History:     Family History   Problem Relation Age of Onset    Hyperlipidemia Mother     Hypertension Mother     Hypertension Father     Diabetes Father     Diabetes Sister     Diabetes Sister     Diabetes Maternal Aunt     Diabetes Maternal Grandmother     Heart disease Maternal Grandmother     Cancer Paternal Grandmother      Physical Exam:   BP (!) 101/59 (BP Location: Right arm, Patient Position: Sitting, BP Method: Medium (Automatic))   Pulse 83   Ht 5' 8" (1.727 m)   Wt 93 " kg (205 lb 0.4 oz)   BMI 31.17 kg/m²        Physical Exam   Constitutional: She is oriented to person, place, and time. She does not appear ill. No distress.   Pleasant middle age female   HENT:   Head: Normocephalic.   Mouth/Throat: Mucous membranes are moist.   Cardiovascular: Normal rate, regular rhythm and normal pulses.   Murmur heard.  AVF in LUE   Abdominal: Soft. Bowel sounds are normal. She exhibits no distension. There is no abdominal tenderness.   Musculoskeletal:      Cervical back: Neck supple.      Right lower leg: No edema.      Left lower leg: No edema.   Neurological: She is alert and oriented to person, place, and time.   Skin: Skin is warm.   Psychiatric: Her behavior is normal. Mood normal.        Labs:     Lab Results   Component Value Date     03/10/2022    K 3.8 03/10/2022    CL 93 (L) 03/10/2022    CO2 30 (H) 03/10/2022    BUN 23 (H) 03/10/2022    CREATININE 4.9 (H) 03/10/2022    ANIONGAP 15 03/10/2022     Lab Results   Component Value Date    HGBA1C >14.0 (H) 03/10/2022    HGBA1C 6.4 01/16/2019     Lab Results   Component Value Date     (H) 05/19/2021     (H) 10/23/2018     (H) 08/28/2018    Lab Results   Component Value Date    WBC 8.86 03/10/2022    HGB 11.6 (L) 03/10/2022    HCT 36.4 (L) 03/10/2022     03/10/2022    GRAN 5.8 03/10/2022    GRAN 64.9 03/10/2022     Lab Results   Component Value Date    CHOL 251 (H) 03/10/2022    HDL 50 03/10/2022    HDL 47 05/22/2018    LDLCALC 158.0 03/10/2022    TRIG 215 (H) 03/10/2022          Imaging:   TTE 4/5/2022  · The left ventricle is normal in size with normal systolic function. The estimated ejection fraction is 65%.  · Normal right ventricular size with normal right ventricular systolic function.  · Grade II left ventricular diastolic dysfunction.  · Moderate left atrial enlargement.  · The estimated PA systolic pressure is 38 mmHg.  · Normal central venous pressure (3 mmHg).    Assessment:   Ms García is a 56  yo F with PMH ESRD on HD, DM2, HTN, HLD presenting as a referral from renal transplant for cardiac risk stratification prior to potential kidney transplant.    Plan:     ESRD, candidate for organ transplantation  - RCRI 4; 15% 30-day risk of death, MI, or cardiac arrest with intra-abdominal surgery  - TTE today with normal EF and diastolic dysfunction (no reported Hx of CHF)  - NST from today pending; will call with results  - HbA1c > 14%; recommend aggressive diabetic control prior to surgery; managed by Endocrinology, but GLP1 agonist or DDP4 inhibitor would be good agents as SGLT2 inhibitor contraindicated in ESRD on HD  - Continue home antihypertensive regimen with amlodipine, losartan, metoprolol tartrate, and minoxidil; BP borderline today, but asymptomatic  - Continue statin and fenofibrate    RTC PRN.    Discussed case with Dr Kelley Arias MD.    Porfirio Lea MD PGY4  Cardiovascular Medicine Fellow  [facility]  Pager: [phone]

## 2022-04-19 ENCOUNTER — TELEPHONE (OUTPATIENT)
Dept: GASTROENTEROLOGY | Facility: CLINIC | Age: 57
End: 2022-04-19
Payer: MEDICAID

## 2022-04-19 NOTE — TELEPHONE ENCOUNTER
----- Message from Maryanne Dill sent at 4/19/2022  9:41 AM CDT -----  Contact: Pt  Type: Needs Medical Advice    Who Called:   Best Call Back Number: [phone]  Additional  Information: Pt would like a call back to discuss instrustions for upcoming procedure on 4/26/22, has questions  Please Advise- Thank you

## 2022-04-20 NOTE — PROGRESS NOTES
Please review stress test: mild intensity, small sized, reversible perfusion abnormality that is consistent with ischemia in the basal to mid inferolateral wall(s) in the typical distribution of the LCX territory.

## 2022-04-26 ENCOUNTER — ANESTHESIA EVENT (OUTPATIENT)
Dept: ENDOSCOPY | Facility: HOSPITAL | Age: 57
End: 2022-04-26
Payer: MEDICARE

## 2022-04-26 ENCOUNTER — ANESTHESIA (OUTPATIENT)
Dept: ENDOSCOPY | Facility: HOSPITAL | Age: 57
End: 2022-04-26
Payer: MEDICARE

## 2022-04-26 PROCEDURE — 25000003 PHARM REV CODE 250: Mod: TXP | Performed by: INTERNAL MEDICINE

## 2022-04-26 PROCEDURE — D9220A PRA ANESTHESIA: ICD-10-PCS | Mod: PT,TXP,, | Performed by: ANESTHESIOLOGY

## 2022-04-26 PROCEDURE — 63600175 PHARM REV CODE 636 W HCPCS: Mod: TXP | Performed by: NURSE ANESTHETIST, CERTIFIED REGISTERED

## 2022-04-26 PROCEDURE — D9220A PRA ANESTHESIA: Mod: PT,TXP,, | Performed by: ANESTHESIOLOGY

## 2022-04-26 PROCEDURE — 25000003 PHARM REV CODE 250: Mod: TXP | Performed by: NURSE ANESTHETIST, CERTIFIED REGISTERED

## 2022-04-26 RX ORDER — LIDOCAINE HYDROCHLORIDE 20 MG/ML
INJECTION INTRAVENOUS
Status: DISCONTINUED | OUTPATIENT
Start: 2022-04-26 | End: 2022-04-26

## 2022-04-26 RX ORDER — PHENYLEPHRINE HYDROCHLORIDE 10 MG/ML
INJECTION INTRAVENOUS
Status: DISCONTINUED | OUTPATIENT
Start: 2022-04-26 | End: 2022-04-26

## 2022-04-26 RX ORDER — PROPOFOL 10 MG/ML
VIAL (ML) INTRAVENOUS
Status: DISCONTINUED | OUTPATIENT
Start: 2022-04-26 | End: 2022-04-26

## 2022-04-26 RX ADMIN — PHENYLEPHRINE HYDROCHLORIDE 100 MCG: 10 INJECTION INTRAVENOUS at 09:04

## 2022-04-26 RX ADMIN — PROPOFOL 50 MG: 10 INJECTION, EMULSION INTRAVENOUS at 09:04

## 2022-04-26 RX ADMIN — LIDOCAINE HYDROCHLORIDE 30 MG: 20 INJECTION, SOLUTION INTRAVENOUS at 09:04

## 2022-04-26 RX ADMIN — SODIUM CHLORIDE: 0.9 INJECTION, SOLUTION INTRAVENOUS at 08:04

## 2022-04-26 NOTE — TRANSFER OF CARE
"Anesthesia Transfer of Care Note    Patient: Leanna García    Procedure(s) Performed: Procedure(s) (LRB):  COLONOSCOPY (N/A)    Patient location: GI    Anesthesia Type: general    Transport from OR: Transported from OR on room air with adequate spontaneous ventilation    Post pain: adequate analgesia    Post assessment: no apparent anesthetic complications    Post vital signs: stable    Level of consciousness: sedated    Nausea/Vomiting: no nausea/vomiting    Complications: none    Transfer of care protocol was followed      Last vitals:   Visit Vitals  BP (!) 109/53   Pulse (!) 53   Temp 37.2 °C (99 °F)   Resp 18   Ht 5' 8" (1.727 m)   Wt 93 kg (205 lb)   SpO2 100%   Breastfeeding No   BMI 31.17 kg/m²     "

## 2022-04-26 NOTE — ANESTHESIA POSTPROCEDURE EVALUATION
Anesthesia Post Evaluation    Patient: Leanna García    Procedure(s) Performed: Procedure(s) (LRB):  COLONOSCOPY (N/A)    Final Anesthesia Type: general      Patient location during evaluation: PACU  Patient participation: Yes- Able to Participate  Level of consciousness: awake and alert and oriented  Post-procedure vital signs: reviewed and stable  Pain management: adequate  Airway patency: patent    PONV status at discharge: No PONV  Anesthetic complications: no      Cardiovascular status: blood pressure returned to baseline  Respiratory status: unassisted, spontaneous ventilation and room air  Hydration status: euvolemic  Follow-up not needed.          Vitals Value Taken Time   /66 04/26/22 1017   Temp 36.8 °C (98.2 °F) 04/26/22 1012   Pulse 66 04/26/22 1017   Resp 20 04/26/22 1017   SpO2 96 % 04/26/22 1017         No case tracking events are documented in the log.      Pain/Peña Score: Peña Score: 10 (4/26/2022 10:17 AM)

## 2022-04-26 NOTE — ANESTHESIA PREPROCEDURE EVALUATION
04/26/2022  Leanna García is a 56 y.o., female.    Pre-op Assessment    I have reviewed the Patient Summary Reports.    I have reviewed the Nursing Notes. I have reviewed the NPO Status.      Review of Systems  Anesthesia Hx:  No problems with previous Anesthesia Denies Hx of Anesthetic complications    Social:  Non-Smoker    Cardiovascular:   Exercise tolerance: poor Hypertension, poorly controlled Denies MI.  Denies CAD.    Denies CABG/stent.   Denies Angina. CHF ECG has been reviewed.    Pulmonary:   Denies COPD.  Denies Asthma.  Denies Recent URI.    Renal/:   Chronic Renal Disease, ESRD, Dialysis Fistula left arm- last dialyzed yesterday   Hepatic/GI:   Bowel Prep. Denies GERD. Denies Liver Disease.    Neurological:   TIA, Denies CVA. Denies Seizures.    Endocrine:   Diabetes, poorly controlled, type 2 Denies Hypothyroidism.  Obesity / BMI > 30  Psych:   Denies Psychiatric History.          Physical Exam  General:  Obesity      Airway/Jaw/Neck:  Airway Findings: Mouth Opening: Normal   Tongue: Normal   General Airway Assessment: Adult, Average Mallampati: II       Chest/Lungs:  Chest/Lungs Findings: Clear to auscultation, Normal Respiratory Rate      Heart/Vascular:  Heart Findings: Rate: Normal  Rhythm: Regular Rhythm  Sounds: Normal        Mental Status:  Mental Status Findings:  Cooperative, Alert and Oriented         Anesthesia Plan  Type of Anesthesia, risks & benefits discussed:  Anesthesia Type:  general    Patient's Preference:   Plan Factors:          Intra-op Monitoring Plan: standard ASA monitors  Intra-op Monitoring Plan Comments:   Post Op Pain Control Plan:   Post Op Pain Control Plan Comments:     Induction:   IV  Beta Blocker:  Patient is on a Beta-Blocker and has received one dose within the past 24 hours (No further documentation required).       Informed Consent: Informed consent  signed with the Patient and all parties understand the risks and agree with anesthesia plan.  All questions answered.  Anesthesia consent signed with patient.  ASA Score: 4     Day of Surgery Review of History & Physical: I have interviewed and examined the patient. I have reviewed the patient's H&P dated:  There are no significant changes.            Ready For Surgery From Anesthesia Perspective.           Physical Exam  General: Obesity    Airway:  Mallampati: II   Mouth Opening: Normal  Tongue: Normal    Chest/Lungs:  Clear to auscultation, Normal Respiratory Rate    Heart:  Rate: Normal  Rhythm: Regular Rhythm  Sounds: Normal          Anesthesia Plan  Type of Anesthesia, risks & benefits discussed:    Anesthesia Type: general  Intra-op Monitoring Plan: standard ASA monitors  Induction:  IV  Informed Consent: Informed consent signed with the Patient and all parties understand the risks and agree with anesthesia plan.  All questions answered.   ASA Score: 4  Day of Surgery Review of History & Physical: I have interviewed and examined the patient. I have reviewed the patient's H&P dated:     Ready For Surgery From Anesthesia Perspective.       .

## 2022-04-28 ENCOUNTER — OFFICE VISIT (OUTPATIENT)
Dept: OBSTETRICS AND GYNECOLOGY | Facility: CLINIC | Age: 57
End: 2022-04-28
Payer: MEDICARE

## 2022-04-28 VITALS
BODY MASS INDEX: 31.53 KG/M2 | SYSTOLIC BLOOD PRESSURE: 180 MMHG | DIASTOLIC BLOOD PRESSURE: 82 MMHG | WEIGHT: 207.31 LBS

## 2022-04-28 DIAGNOSIS — Z01.419 ENCOUNTER FOR WELL WOMAN EXAM WITH ROUTINE GYNECOLOGICAL EXAM: Primary | ICD-10-CM

## 2022-04-28 DIAGNOSIS — Z76.82 ORGAN TRANSPLANT CANDIDATE: ICD-10-CM

## 2022-04-28 PROCEDURE — 3079F PR MOST RECENT DIASTOLIC BLOOD PRESSURE 80-89 MM HG: ICD-10-PCS | Mod: CPTII,S$GLB,, | Performed by: NURSE PRACTITIONER

## 2022-04-28 PROCEDURE — 99999 PR PBB SHADOW E&M-EST. PATIENT-LVL IV: CPT | Mod: PBBFAC,,, | Performed by: NURSE PRACTITIONER

## 2022-04-28 PROCEDURE — 3079F DIAST BP 80-89 MM HG: CPT | Mod: CPTII,S$GLB,, | Performed by: NURSE PRACTITIONER

## 2022-04-28 PROCEDURE — 99999 PR PBB SHADOW E&M-EST. PATIENT-LVL IV: ICD-10-PCS | Mod: PBBFAC,,, | Performed by: NURSE PRACTITIONER

## 2022-04-28 PROCEDURE — 1160F PR REVIEW ALL MEDS BY PRESCRIBER/CLIN PHARMACIST DOCUMENTED: ICD-10-PCS | Mod: CPTII,S$GLB,, | Performed by: NURSE PRACTITIONER

## 2022-04-28 PROCEDURE — 3077F SYST BP >= 140 MM HG: CPT | Mod: CPTII,S$GLB,, | Performed by: NURSE PRACTITIONER

## 2022-04-28 PROCEDURE — 4010F ACE/ARB THERAPY RXD/TAKEN: CPT | Mod: CPTII,S$GLB,, | Performed by: NURSE PRACTITIONER

## 2022-04-28 PROCEDURE — 4010F PR ACE/ARB THEARPY RXD/TAKEN: ICD-10-PCS | Mod: CPTII,S$GLB,, | Performed by: NURSE PRACTITIONER

## 2022-04-28 PROCEDURE — 3008F PR BODY MASS INDEX (BMI) DOCUMENTED: ICD-10-PCS | Mod: CPTII,S$GLB,, | Performed by: NURSE PRACTITIONER

## 2022-04-28 PROCEDURE — 3046F PR MOST RECENT HEMOGLOBIN A1C LEVEL > 9.0%: ICD-10-PCS | Mod: CPTII,S$GLB,, | Performed by: NURSE PRACTITIONER

## 2022-04-28 PROCEDURE — 1159F MED LIST DOCD IN RCRD: CPT | Mod: CPTII,S$GLB,, | Performed by: NURSE PRACTITIONER

## 2022-04-28 PROCEDURE — G0101 PR CA SCREEN;PELVIC/BREAST EXAM: ICD-10-PCS | Mod: S$GLB,,, | Performed by: NURSE PRACTITIONER

## 2022-04-28 PROCEDURE — 3008F BODY MASS INDEX DOCD: CPT | Mod: CPTII,S$GLB,, | Performed by: NURSE PRACTITIONER

## 2022-04-28 PROCEDURE — 1160F RVW MEDS BY RX/DR IN RCRD: CPT | Mod: CPTII,S$GLB,, | Performed by: NURSE PRACTITIONER

## 2022-04-28 PROCEDURE — G0101 CA SCREEN;PELVIC/BREAST EXAM: HCPCS | Mod: S$GLB,,, | Performed by: NURSE PRACTITIONER

## 2022-04-28 PROCEDURE — 3077F PR MOST RECENT SYSTOLIC BLOOD PRESSURE >= 140 MM HG: ICD-10-PCS | Mod: CPTII,S$GLB,, | Performed by: NURSE PRACTITIONER

## 2022-04-28 PROCEDURE — 3046F HEMOGLOBIN A1C LEVEL >9.0%: CPT | Mod: CPTII,S$GLB,, | Performed by: NURSE PRACTITIONER

## 2022-04-28 PROCEDURE — 1159F PR MEDICATION LIST DOCUMENTED IN MEDICAL RECORD: ICD-10-PCS | Mod: CPTII,S$GLB,, | Performed by: NURSE PRACTITIONER

## 2022-04-28 NOTE — PROGRESS NOTES
Chief Complaint: Well Woman Exam     HPI:      Leanna García is a 56 y.o.  who presents today for well woman exam. She is completing screening to be placed on the kidney transplant list. Patient had a hysterectomy/ BSO in  for bleeding. Patient is currently sexually active. She declines STD screening today. Ms. García confirms that she is safe at home.  Ms. García denies abnormal vaginal bleeding, discharge, pelvic pain, urinary problems, or changes in appetite.    Previous Pap:  no abnormalities (prior to hyst) Denies hx of abnormal paps  Previous Mammogram: 2019 which resulted in a benign breast biopsy. No mammogram since.   Colonoscopy: scheduled for next week    Family History   Problem Relation Age of Onset    Hyperlipidemia Mother     Hypertension Mother     Hypertension Father     Diabetes Father     Diabetes Sister     Diabetes Sister     Diabetes Maternal Aunt     Diabetes Maternal Grandmother     Heart disease Maternal Grandmother     Cancer Paternal Grandmother     Breast cancer Neg Hx     Colon cancer Neg Hx     Ovarian cancer Neg Hx      OB History        1    Para   1    Term   1            AB        Living           SAB        IAB        Ectopic        Multiple        Live Births                     ROS:     GENERAL: Denies unintentional weight gain or weight loss. Feeling well overall.   BREASTS: Denies pain, lumps, or nipple discharge.   ABDOMEN: Denies abdominal pain, constipation, diarrhea, nausea, vomiting, change in appetite.  URINARY: Denies frequency, dysuria, hematuria.  PSYCHIATRIC: Denies depression, anxiety or mood swings.    Physical Exam:      PHYSICAL EXAM:  BP (!) 180/82   Wt 94 kg (207 lb 5.5 oz)   BMI 31.53 kg/m²   Body mass index is 31.53 kg/m².     APPEARANCE: Well nourished, well developed, in no acute distress.  PSYCH: Appropriate mood and affect.  ABDOMEN: Soft.  No tenderness or masses.    BREASTS: Symmetrical, no skin changes or  visible lesions.  No palpable masses or nipple discharge bilaterally.  PELVIC: Normal external genitalia without lesions.  Normal hair distribution.  Adequate perineal body, normal urethral meatus.  Vagina moist, mildly atrophic without lesions or discharge.  Cervix and uterus absent.  No significant cystocele or rectocele.  Bimanual exam shows pelvis without masses or tenderness.      Assessment/Plan:     Encounter for well woman exam with routine gynecological exam    Organ transplant candidate  -     Ambulatory referral/consult to Gynecology        Counseling:     Patient was counseled today on current ASCCP pap guidelines, the recommendation for yearly pelvic exams, healthy diet and exercise routines, breast self awareness and annual mammograms.She is to see her PCP for other health maintenance.     No pap indicated

## 2022-05-02 ENCOUNTER — TELEPHONE (OUTPATIENT)
Dept: TRANSPLANT | Facility: CLINIC | Age: 57
End: 2022-05-02
Payer: MEDICAID

## 2022-05-02 NOTE — TELEPHONE ENCOUNTER
Spoken to patient in regards to scheduled appointment on 5/5/22. Patient will be pending Endocrinology (local) follow-up due to HgbA1c greater than 14.

## 2022-05-04 ENCOUNTER — HOSPITAL ENCOUNTER (INPATIENT)
Facility: HOSPITAL | Age: 57
LOS: 15 days | Discharge: NURSING, REHAB OR LONG-TERM CARE | DRG: 853 | End: 2022-05-19
Attending: EMERGENCY MEDICINE | Admitting: INTERNAL MEDICINE
Payer: MEDICARE

## 2022-05-04 DIAGNOSIS — A41.9 SEPSIS: ICD-10-CM

## 2022-05-04 DIAGNOSIS — N18.6 ESRD (END STAGE RENAL DISEASE): ICD-10-CM

## 2022-05-04 DIAGNOSIS — L97.422 DIABETIC ULCER OF LEFT MIDFOOT ASSOCIATED WITH TYPE 2 DIABETES MELLITUS, WITH FAT LAYER EXPOSED: ICD-10-CM

## 2022-05-04 DIAGNOSIS — R78.81 BACTEREMIA: ICD-10-CM

## 2022-05-04 DIAGNOSIS — J20.9 ACUTE BRONCHITIS, UNSPECIFIED ORGANISM: ICD-10-CM

## 2022-05-04 DIAGNOSIS — L08.9 DIABETIC FOOT INFECTION: Primary | ICD-10-CM

## 2022-05-04 DIAGNOSIS — R07.9 CHEST PAIN: ICD-10-CM

## 2022-05-04 DIAGNOSIS — R50.9 FEVER: ICD-10-CM

## 2022-05-04 DIAGNOSIS — A41.9 SEPSIS, DUE TO UNSPECIFIED ORGANISM, UNSPECIFIED WHETHER ACUTE ORGAN DYSFUNCTION PRESENT: ICD-10-CM

## 2022-05-04 DIAGNOSIS — L08.9 FOOT INFECTION: ICD-10-CM

## 2022-05-04 DIAGNOSIS — E11.621 DIABETIC ULCER OF LEFT MIDFOOT ASSOCIATED WITH TYPE 2 DIABETES MELLITUS, WITH NECROSIS OF MUSCLE: ICD-10-CM

## 2022-05-04 DIAGNOSIS — L02.612 ABSCESS OF LEFT FOOT: ICD-10-CM

## 2022-05-04 DIAGNOSIS — E11.65 TYPE 2 DIABETES MELLITUS WITH HYPERGLYCEMIA, WITHOUT LONG-TERM CURRENT USE OF INSULIN: ICD-10-CM

## 2022-05-04 DIAGNOSIS — E11.621: ICD-10-CM

## 2022-05-04 DIAGNOSIS — E11.628 DIABETIC FOOT INFECTION: Primary | ICD-10-CM

## 2022-05-04 DIAGNOSIS — L97.429: ICD-10-CM

## 2022-05-04 DIAGNOSIS — E87.6 HYPOKALEMIA: ICD-10-CM

## 2022-05-04 DIAGNOSIS — I72.3 ILIAC ANEURYSM: Primary | ICD-10-CM

## 2022-05-04 DIAGNOSIS — L97.423 DIABETIC ULCER OF LEFT MIDFOOT ASSOCIATED WITH TYPE 2 DIABETES MELLITUS, WITH NECROSIS OF MUSCLE: ICD-10-CM

## 2022-05-04 DIAGNOSIS — E78.00 HYPERCHOLESTEREMIA: ICD-10-CM

## 2022-05-04 DIAGNOSIS — R05.9 COUGH: ICD-10-CM

## 2022-05-04 DIAGNOSIS — E11.621 DIABETIC ULCER OF LEFT MIDFOOT ASSOCIATED WITH TYPE 2 DIABETES MELLITUS, WITH FAT LAYER EXPOSED: ICD-10-CM

## 2022-05-04 LAB
ALBUMIN SERPL BCP-MCNC: 3.5 G/DL (ref 3.5–5.2)
ALP SERPL-CCNC: 60 U/L (ref 55–135)
ALT SERPL W/O P-5'-P-CCNC: 11 U/L (ref 10–44)
ANION GAP SERPL CALC-SCNC: 20 MMOL/L (ref 8–16)
ANISOCYTOSIS BLD QL SMEAR: SLIGHT
APTT PPP: 22.5 SEC (ref 23.3–35.1)
AST SERPL-CCNC: 13 U/L (ref 10–40)
BASOPHILS NFR BLD: 0 % (ref 0–1.9)
BILIRUB SERPL-MCNC: 0.9 MG/DL (ref 0.1–1)
BNP SERPL-MCNC: 881 PG/ML (ref 0–99)
BUN SERPL-MCNC: 51 MG/DL (ref 6–20)
CALCIUM SERPL-MCNC: 8.8 MG/DL (ref 8.7–10.5)
CHLORIDE SERPL-SCNC: 84 MMOL/L (ref 95–110)
CO2 SERPL-SCNC: 24 MMOL/L (ref 23–29)
CREAT SERPL-MCNC: 9.9 MG/DL (ref 0.5–1.4)
DIFFERENTIAL METHOD: ABNORMAL
EOSINOPHIL NFR BLD: 0 % (ref 0–8)
ERYTHROCYTE [DISTWIDTH] IN BLOOD BY AUTOMATED COUNT: 14.4 % (ref 11.5–14.5)
EST. GFR  (AFRICAN AMERICAN): 4.6 ML/MIN/1.73 M^2
EST. GFR  (NON AFRICAN AMERICAN): 3.9 ML/MIN/1.73 M^2
GLUCOSE SERPL-MCNC: 446 MG/DL (ref 70–110)
HCT VFR BLD AUTO: 28.5 % (ref 37–48.5)
HGB BLD-MCNC: 9.2 G/DL (ref 12–16)
IMM GRANULOCYTES # BLD AUTO: ABNORMAL K/UL (ref 0–0.04)
IMM GRANULOCYTES NFR BLD AUTO: ABNORMAL % (ref 0–0.5)
INFLUENZA A, MOLECULAR: NEGATIVE
INFLUENZA B, MOLECULAR: NEGATIVE
INR PPP: 1.3
LACTATE SERPL-SCNC: 1.8 MMOL/L (ref 0.5–1.9)
LIPASE SERPL-CCNC: 36 U/L (ref 4–60)
LYMPHOCYTES NFR BLD: 3 % (ref 18–48)
MAGNESIUM SERPL-MCNC: 2 MG/DL (ref 1.6–2.6)
MCH RBC QN AUTO: 27.1 PG (ref 27–31)
MCHC RBC AUTO-ENTMCNC: 32.3 G/DL (ref 32–36)
MCV RBC AUTO: 84 FL (ref 82–98)
MONOCYTES NFR BLD: 8 % (ref 4–15)
NEUTROPHILS NFR BLD: 86 % (ref 38–73)
NEUTS BAND NFR BLD MANUAL: 3 %
NRBC BLD-RTO: 0 /100 WBC
PLATELET # BLD AUTO: 290 K/UL (ref 150–450)
PMV BLD AUTO: 10.5 FL (ref 9.2–12.9)
POTASSIUM SERPL-SCNC: 3.2 MMOL/L (ref 3.5–5.1)
PROT SERPL-MCNC: 8 G/DL (ref 6–8.4)
PROTHROMBIN TIME: 15.3 SEC (ref 11.4–13.7)
RBC # BLD AUTO: 3.39 M/UL (ref 4–5.4)
SARS-COV-2 RDRP RESP QL NAA+PROBE: NEGATIVE
SODIUM SERPL-SCNC: 128 MMOL/L (ref 136–145)
SPECIMEN SOURCE: NORMAL
TROPONIN I SERPL DL<=0.01 NG/ML-MCNC: 0.4 NG/ML
TSH SERPL DL<=0.005 MIU/L-ACNC: 1.08 UIU/ML (ref 0.34–5.6)
WBC # BLD AUTO: 21.06 K/UL (ref 3.9–12.7)

## 2022-05-04 PROCEDURE — 83690 ASSAY OF LIPASE: CPT | Performed by: EMERGENCY MEDICINE

## 2022-05-04 PROCEDURE — 99900031 HC PATIENT EDUCATION (STAT)

## 2022-05-04 PROCEDURE — 36415 COLL VENOUS BLD VENIPUNCTURE: CPT | Performed by: EMERGENCY MEDICINE

## 2022-05-04 PROCEDURE — 27000221 HC OXYGEN, UP TO 24 HOURS

## 2022-05-04 PROCEDURE — 85610 PROTHROMBIN TIME: CPT | Performed by: EMERGENCY MEDICINE

## 2022-05-04 PROCEDURE — 21400001 HC TELEMETRY ROOM

## 2022-05-04 PROCEDURE — 94761 N-INVAS EAR/PLS OXIMETRY MLT: CPT

## 2022-05-04 PROCEDURE — 80053 COMPREHEN METABOLIC PANEL: CPT | Performed by: EMERGENCY MEDICINE

## 2022-05-04 PROCEDURE — 99900035 HC TECH TIME PER 15 MIN (STAT)

## 2022-05-04 PROCEDURE — 84484 ASSAY OF TROPONIN QUANT: CPT | Performed by: EMERGENCY MEDICINE

## 2022-05-04 PROCEDURE — U0002 COVID-19 LAB TEST NON-CDC: HCPCS | Performed by: EMERGENCY MEDICINE

## 2022-05-04 PROCEDURE — 83605 ASSAY OF LACTIC ACID: CPT | Performed by: EMERGENCY MEDICINE

## 2022-05-04 PROCEDURE — 93005 ELECTROCARDIOGRAM TRACING: CPT | Performed by: GENERAL PRACTICE

## 2022-05-04 PROCEDURE — 85730 THROMBOPLASTIN TIME PARTIAL: CPT | Performed by: EMERGENCY MEDICINE

## 2022-05-04 PROCEDURE — 25000242 PHARM REV CODE 250 ALT 637 W/ HCPCS: Performed by: EMERGENCY MEDICINE

## 2022-05-04 PROCEDURE — 87040 BLOOD CULTURE FOR BACTERIA: CPT | Mod: 59 | Performed by: EMERGENCY MEDICINE

## 2022-05-04 PROCEDURE — 83880 ASSAY OF NATRIURETIC PEPTIDE: CPT | Performed by: EMERGENCY MEDICINE

## 2022-05-04 PROCEDURE — 86140 C-REACTIVE PROTEIN: CPT | Performed by: EMERGENCY MEDICINE

## 2022-05-04 PROCEDURE — 93010 EKG 12-LEAD: ICD-10-PCS | Mod: ,,, | Performed by: GENERAL PRACTICE

## 2022-05-04 PROCEDURE — 84443 ASSAY THYROID STIM HORMONE: CPT | Performed by: EMERGENCY MEDICINE

## 2022-05-04 PROCEDURE — 25000003 PHARM REV CODE 250: Performed by: EMERGENCY MEDICINE

## 2022-05-04 PROCEDURE — 83735 ASSAY OF MAGNESIUM: CPT | Performed by: EMERGENCY MEDICINE

## 2022-05-04 PROCEDURE — 94799 UNLISTED PULMONARY SVC/PX: CPT

## 2022-05-04 PROCEDURE — 87502 INFLUENZA DNA AMP PROBE: CPT | Performed by: EMERGENCY MEDICINE

## 2022-05-04 PROCEDURE — 94640 AIRWAY INHALATION TREATMENT: CPT

## 2022-05-04 PROCEDURE — 99285 EMERGENCY DEPT VISIT HI MDM: CPT | Mod: 25

## 2022-05-04 PROCEDURE — 93010 ELECTROCARDIOGRAM REPORT: CPT | Mod: ,,, | Performed by: GENERAL PRACTICE

## 2022-05-04 RX ORDER — FLUTICASONE PROPIONATE 50 MCG
2 SPRAY, SUSPENSION (ML) NASAL DAILY
Status: DISCONTINUED | OUTPATIENT
Start: 2022-05-05 | End: 2022-05-19 | Stop reason: HOSPADM

## 2022-05-04 RX ORDER — INSULIN ASPART 100 [IU]/ML
1-6 INJECTION, SOLUTION INTRAVENOUS; SUBCUTANEOUS
Status: DISCONTINUED | OUTPATIENT
Start: 2022-05-04 | End: 2022-05-19 | Stop reason: HOSPADM

## 2022-05-04 RX ORDER — LEVALBUTEROL INHALATION SOLUTION 0.63 MG/3ML
0.63 SOLUTION RESPIRATORY (INHALATION)
Status: COMPLETED | OUTPATIENT
Start: 2022-05-04 | End: 2022-05-04

## 2022-05-04 RX ORDER — POTASSIUM CHLORIDE 20 MEQ/1
20 TABLET, EXTENDED RELEASE ORAL ONCE
Status: COMPLETED | OUTPATIENT
Start: 2022-05-05 | End: 2022-05-05

## 2022-05-04 RX ORDER — MEROPENEM AND SODIUM CHLORIDE 1 G/50ML
1 INJECTION, SOLUTION INTRAVENOUS ONCE
Status: COMPLETED | OUTPATIENT
Start: 2022-05-04 | End: 2022-05-05

## 2022-05-04 RX ORDER — BENZONATATE 100 MG/1
100 CAPSULE ORAL 3 TIMES DAILY PRN
Status: DISCONTINUED | OUTPATIENT
Start: 2022-05-05 | End: 2022-05-19 | Stop reason: HOSPADM

## 2022-05-04 RX ORDER — ASPIRIN 81 MG/1
81 TABLET ORAL DAILY
COMMUNITY
End: 2022-11-02

## 2022-05-04 RX ORDER — CETIRIZINE HYDROCHLORIDE 10 MG/1
10 TABLET ORAL EVERY OTHER DAY
Status: DISCONTINUED | OUTPATIENT
Start: 2022-05-05 | End: 2022-05-19 | Stop reason: HOSPADM

## 2022-05-04 RX ORDER — ONDANSETRON 2 MG/ML
4 INJECTION INTRAMUSCULAR; INTRAVENOUS
Status: COMPLETED | OUTPATIENT
Start: 2022-05-04 | End: 2022-05-05

## 2022-05-04 RX ADMIN — BACITRACIN ZINC, NEOMYCIN, POLYMYXIN B: 400; 3.5; 5 OINTMENT TOPICAL at 11:05

## 2022-05-04 RX ADMIN — LEVALBUTEROL HYDROCHLORIDE 0.63 MG: 0.63 SOLUTION RESPIRATORY (INHALATION) at 10:05

## 2022-05-04 NOTE — Clinical Note
Diagnosis: Sepsis [674395]   Admitting Provider:: ERIKA ELENA [[id number]]   Future Attending Provider: ERIKA ELENA [[id number]]   Reason for IP Medical Treatment  (Clinical interventions that can only be accomplished in the IP setting? ) :: sepsis, foot ulcer, enteritis   Estimated Length of Stay:: 2 midnights   I certify that Inpatient services for greater than or equal to 2 midnights are medically necessary:: Yes   Plans for Post-Acute care--if anticipated (pick the single best option):: A. No post acute care anticipated at this time   Special Needs:: No Special Needs [1]

## 2022-05-05 PROBLEM — E11.65 TYPE 2 DIABETES MELLITUS WITH HYPERGLYCEMIA, WITHOUT LONG-TERM CURRENT USE OF INSULIN: Status: RESOLVED | Noted: 2020-06-25 | Resolved: 2022-05-05

## 2022-05-05 PROBLEM — K52.9 ENTERITIS: Status: ACTIVE | Noted: 2022-05-05

## 2022-05-05 PROBLEM — E87.5 HYPERKALEMIA: Status: RESOLVED | Noted: 2017-12-18 | Resolved: 2022-05-05

## 2022-05-05 PROBLEM — E11.621 DIABETIC ULCER OF LEFT MIDFOOT: Status: ACTIVE | Noted: 2022-05-05

## 2022-05-05 PROBLEM — A41.9 SEPSIS: Status: ACTIVE | Noted: 2022-05-05

## 2022-05-05 PROBLEM — R79.89 ELEVATED TROPONIN: Status: ACTIVE | Noted: 2022-05-05

## 2022-05-05 PROBLEM — L97.429 DIABETIC ULCER OF LEFT MIDFOOT: Status: ACTIVE | Noted: 2022-05-05

## 2022-05-05 PROBLEM — E87.6 HYPOKALEMIA: Status: ACTIVE | Noted: 2022-05-05

## 2022-05-05 PROBLEM — R78.81 BACTEREMIA: Status: ACTIVE | Noted: 2022-05-05

## 2022-05-05 LAB
ADENOVIRUS: NOT DETECTED
ANION GAP SERPL CALC-SCNC: 22 MMOL/L (ref 8–16)
B-OH-BUTYR BLD STRIP-SCNC: 0.1 MMOL/L (ref 0–0.5)
BASOPHILS # BLD AUTO: 0.04 K/UL (ref 0–0.2)
BASOPHILS NFR BLD: 0.2 % (ref 0–1.9)
BORDETELLA PARAPERTUSSIS (IS1001): NOT DETECTED
BORDETELLA PERTUSSIS (PTXP): NOT DETECTED
BUN SERPL-MCNC: 51 MG/DL (ref 6–20)
CALCIUM SERPL-MCNC: 9.1 MG/DL (ref 8.7–10.5)
CHLAMYDIA PNEUMONIAE: NOT DETECTED
CHLORIDE SERPL-SCNC: 84 MMOL/L (ref 95–110)
CO2 SERPL-SCNC: 25 MMOL/L (ref 23–29)
CORONAVIRUS 229E, COMMON COLD VIRUS: NOT DETECTED
CORONAVIRUS HKU1, COMMON COLD VIRUS: NOT DETECTED
CORONAVIRUS NL63, COMMON COLD VIRUS: NOT DETECTED
CORONAVIRUS OC43, COMMON COLD VIRUS: NOT DETECTED
CREAT SERPL-MCNC: 11 MG/DL (ref 0.5–1.4)
CRP SERPL-MCNC: 32.78 MG/DL
DIFFERENTIAL METHOD: ABNORMAL
EOSINOPHIL # BLD AUTO: 0 K/UL (ref 0–0.5)
EOSINOPHIL NFR BLD: 0.1 % (ref 0–8)
ERYTHROCYTE [DISTWIDTH] IN BLOOD BY AUTOMATED COUNT: 14.3 % (ref 11.5–14.5)
EST. GFR  (AFRICAN AMERICAN): 4 ML/MIN/1.73 M^2
EST. GFR  (NON AFRICAN AMERICAN): 3.5 ML/MIN/1.73 M^2
ESTIMATED AVG GLUCOSE: 258 MG/DL (ref 68–131)
FLUBV RNA NPH QL NAA+NON-PROBE: NOT DETECTED
GLUCOSE SERPL-MCNC: 149 MG/DL (ref 70–110)
GLUCOSE SERPL-MCNC: 314 MG/DL (ref 70–110)
GLUCOSE SERPL-MCNC: 321 MG/DL (ref 70–110)
GLUCOSE SERPL-MCNC: 338 MG/DL (ref 70–110)
GLUCOSE SERPL-MCNC: 386 MG/DL (ref 70–110)
HBA1C MFR BLD: 10.6 % (ref 4.5–6.2)
HCT VFR BLD AUTO: 27.4 % (ref 37–48.5)
HGB BLD-MCNC: 9 G/DL (ref 12–16)
HPIV1 RNA NPH QL NAA+NON-PROBE: NOT DETECTED
HPIV2 RNA NPH QL NAA+NON-PROBE: NOT DETECTED
HPIV3 RNA NPH QL NAA+NON-PROBE: NOT DETECTED
HPIV4 RNA NPH QL NAA+NON-PROBE: NOT DETECTED
HUMAN METAPNEUMOVIRUS: NOT DETECTED
IMM GRANULOCYTES # BLD AUTO: 0.11 K/UL (ref 0–0.04)
IMM GRANULOCYTES NFR BLD AUTO: 0.6 % (ref 0–0.5)
INFLUENZA A (SUBTYPES H1,H1-2009,H3): NOT DETECTED
LYMPHOCYTES # BLD AUTO: 1.3 K/UL (ref 1–4.8)
LYMPHOCYTES NFR BLD: 6.9 % (ref 18–48)
MAGNESIUM SERPL-MCNC: 2 MG/DL (ref 1.6–2.6)
MCH RBC QN AUTO: 27.8 PG (ref 27–31)
MCHC RBC AUTO-ENTMCNC: 32.8 G/DL (ref 32–36)
MCV RBC AUTO: 85 FL (ref 82–98)
MONOCYTES # BLD AUTO: 1.7 K/UL (ref 0.3–1)
MONOCYTES NFR BLD: 9.1 % (ref 4–15)
MYCOPLASMA PNEUMONIAE: NOT DETECTED
NEUTROPHILS # BLD AUTO: 15.8 K/UL (ref 1.8–7.7)
NEUTROPHILS NFR BLD: 83.1 % (ref 38–73)
NRBC BLD-RTO: 0 /100 WBC
PLATELET # BLD AUTO: 276 K/UL (ref 150–450)
PMV BLD AUTO: 10.7 FL (ref 9.2–12.9)
POTASSIUM SERPL-SCNC: 3.4 MMOL/L (ref 3.5–5.1)
PROCALCITONIN SERPL IA-MCNC: 34.48 NG/ML (ref 0–0.5)
RBC # BLD AUTO: 3.24 M/UL (ref 4–5.4)
RESPIRATORY INFECTION PANEL SOURCE: NORMAL
RSV RNA NPH QL NAA+NON-PROBE: NOT DETECTED
RV+EV RNA NPH QL NAA+NON-PROBE: NOT DETECTED
SARS-COV-2 RNA RESP QL NAA+PROBE: NOT DETECTED
SODIUM SERPL-SCNC: 131 MMOL/L (ref 136–145)
TROPONIN I SERPL DL<=0.01 NG/ML-MCNC: 0.34 NG/ML
WBC # BLD AUTO: 18.99 K/UL (ref 3.9–12.7)

## 2022-05-05 PROCEDURE — 27000221 HC OXYGEN, UP TO 24 HOURS

## 2022-05-05 PROCEDURE — 87798 DETECT AGENT NOS DNA AMP: CPT | Mod: NTX | Performed by: INTERNAL MEDICINE

## 2022-05-05 PROCEDURE — 87633 RESP VIRUS 12-25 TARGETS: CPT | Mod: NTX | Performed by: INTERNAL MEDICINE

## 2022-05-05 PROCEDURE — 94761 N-INVAS EAR/PLS OXIMETRY MLT: CPT

## 2022-05-05 PROCEDURE — 80048 BASIC METABOLIC PNL TOTAL CA: CPT | Performed by: NURSE PRACTITIONER

## 2022-05-05 PROCEDURE — 63600175 PHARM REV CODE 636 W HCPCS: Performed by: NURSE PRACTITIONER

## 2022-05-05 PROCEDURE — 99900035 HC TECH TIME PER 15 MIN (STAT)

## 2022-05-05 PROCEDURE — 85025 COMPLETE CBC W/AUTO DIFF WBC: CPT | Performed by: NURSE PRACTITIONER

## 2022-05-05 PROCEDURE — 99223 PR INITIAL HOSPITAL CARE,LEVL III: ICD-10-PCS | Mod: ,,, | Performed by: INTERNAL MEDICINE

## 2022-05-05 PROCEDURE — 99900031 HC PATIENT EDUCATION (STAT)

## 2022-05-05 PROCEDURE — 84484 ASSAY OF TROPONIN QUANT: CPT | Performed by: NURSE PRACTITIONER

## 2022-05-05 PROCEDURE — 90935 HEMODIALYSIS ONE EVALUATION: CPT

## 2022-05-05 PROCEDURE — 94799 UNLISTED PULMONARY SVC/PX: CPT

## 2022-05-05 PROCEDURE — 90715 TDAP VACCINE 7 YRS/> IM: CPT | Performed by: NURSE PRACTITIONER

## 2022-05-05 PROCEDURE — 82962 GLUCOSE BLOOD TEST: CPT

## 2022-05-05 PROCEDURE — 84145 PROCALCITONIN (PCT): CPT | Performed by: NURSE PRACTITIONER

## 2022-05-05 PROCEDURE — 25000003 PHARM REV CODE 250: Performed by: NURSE PRACTITIONER

## 2022-05-05 PROCEDURE — 25000242 PHARM REV CODE 250 ALT 637 W/ HCPCS: Performed by: INTERNAL MEDICINE

## 2022-05-05 PROCEDURE — 90471 IMMUNIZATION ADMIN: CPT | Performed by: NURSE PRACTITIONER

## 2022-05-05 PROCEDURE — 83735 ASSAY OF MAGNESIUM: CPT | Performed by: NURSE PRACTITIONER

## 2022-05-05 PROCEDURE — 83036 HEMOGLOBIN GLYCOSYLATED A1C: CPT | Performed by: NURSE PRACTITIONER

## 2022-05-05 PROCEDURE — 94760 N-INVAS EAR/PLS OXIMETRY 1: CPT

## 2022-05-05 PROCEDURE — 99222 1ST HOSP IP/OBS MODERATE 55: CPT | Mod: ,,, | Performed by: PODIATRIST

## 2022-05-05 PROCEDURE — 82010 KETONE BODYS QUAN: CPT | Performed by: NURSE PRACTITIONER

## 2022-05-05 PROCEDURE — 36415 COLL VENOUS BLD VENIPUNCTURE: CPT | Performed by: NURSE PRACTITIONER

## 2022-05-05 PROCEDURE — C9399 UNCLASSIFIED DRUGS OR BIOLOG: HCPCS | Performed by: NURSE PRACTITIONER

## 2022-05-05 PROCEDURE — 92610 EVALUATE SWALLOWING FUNCTION: CPT

## 2022-05-05 PROCEDURE — 99222 PR INITIAL HOSPITAL CARE,LEVL II: ICD-10-PCS | Mod: ,,, | Performed by: PODIATRIST

## 2022-05-05 PROCEDURE — 63600175 PHARM REV CODE 636 W HCPCS: Performed by: INTERNAL MEDICINE

## 2022-05-05 PROCEDURE — 87798 DETECT AGENT NOS DNA AMP: CPT | Mod: 59,NTX | Performed by: INTERNAL MEDICINE

## 2022-05-05 PROCEDURE — 25000003 PHARM REV CODE 250: Performed by: INTERNAL MEDICINE

## 2022-05-05 PROCEDURE — 21400001 HC TELEMETRY ROOM

## 2022-05-05 PROCEDURE — 94640 AIRWAY INHALATION TREATMENT: CPT

## 2022-05-05 PROCEDURE — 99223 1ST HOSP IP/OBS HIGH 75: CPT | Mod: ,,, | Performed by: INTERNAL MEDICINE

## 2022-05-05 PROCEDURE — 25000242 PHARM REV CODE 250 ALT 637 W/ HCPCS: Performed by: NURSE PRACTITIONER

## 2022-05-05 RX ORDER — ATORVASTATIN CALCIUM 40 MG/1
80 TABLET, FILM COATED ORAL NIGHTLY
Status: DISCONTINUED | OUTPATIENT
Start: 2022-05-05 | End: 2022-05-09

## 2022-05-05 RX ORDER — POLYETHYLENE GLYCOL 3350 17 G/17G
17 POWDER, FOR SOLUTION ORAL 2 TIMES DAILY PRN
Status: DISCONTINUED | OUTPATIENT
Start: 2022-05-05 | End: 2022-05-19 | Stop reason: HOSPADM

## 2022-05-05 RX ORDER — MINOXIDIL 2.5 MG/1
5 TABLET ORAL 2 TIMES DAILY
Status: DISCONTINUED | OUTPATIENT
Start: 2022-05-05 | End: 2022-05-06

## 2022-05-05 RX ORDER — ONDANSETRON 2 MG/ML
4 INJECTION INTRAMUSCULAR; INTRAVENOUS EVERY 8 HOURS PRN
Status: DISCONTINUED | OUTPATIENT
Start: 2022-05-05 | End: 2022-05-19 | Stop reason: HOSPADM

## 2022-05-05 RX ORDER — ACETAMINOPHEN 325 MG/1
650 TABLET ORAL EVERY 4 HOURS PRN
Status: DISCONTINUED | OUTPATIENT
Start: 2022-05-05 | End: 2022-05-19 | Stop reason: HOSPADM

## 2022-05-05 RX ORDER — AZELASTINE 1 MG/ML
1 SPRAY, METERED NASAL 2 TIMES DAILY
Status: DISCONTINUED | OUTPATIENT
Start: 2022-05-05 | End: 2022-05-19 | Stop reason: HOSPADM

## 2022-05-05 RX ORDER — ASCORBIC ACID 500 MG
500 TABLET ORAL DAILY
Status: DISCONTINUED | OUTPATIENT
Start: 2022-05-05 | End: 2022-05-19 | Stop reason: HOSPADM

## 2022-05-05 RX ORDER — CALCIUM ACETATE 667 MG/1
1334 CAPSULE ORAL
Status: DISCONTINUED | OUTPATIENT
Start: 2022-05-05 | End: 2022-05-11

## 2022-05-05 RX ORDER — ASPIRIN 81 MG/1
81 TABLET ORAL DAILY
Status: DISCONTINUED | OUTPATIENT
Start: 2022-05-05 | End: 2022-05-19 | Stop reason: HOSPADM

## 2022-05-05 RX ORDER — SODIUM CHLORIDE 0.9 % (FLUSH) 0.9 %
10 SYRINGE (ML) INJECTION EVERY 12 HOURS PRN
Status: DISCONTINUED | OUTPATIENT
Start: 2022-05-05 | End: 2022-05-19 | Stop reason: HOSPADM

## 2022-05-05 RX ORDER — HYDROCODONE BITARTRATE AND ACETAMINOPHEN 5; 325 MG/1; MG/1
1 TABLET ORAL EVERY 6 HOURS PRN
Status: DISCONTINUED | OUTPATIENT
Start: 2022-05-05 | End: 2022-05-13

## 2022-05-05 RX ORDER — METOPROLOL TARTRATE 25 MG/1
25 TABLET, FILM COATED ORAL 2 TIMES DAILY
Status: DISCONTINUED | OUTPATIENT
Start: 2022-05-05 | End: 2022-05-13

## 2022-05-05 RX ORDER — TALC
6 POWDER (GRAM) TOPICAL NIGHTLY PRN
Status: DISCONTINUED | OUTPATIENT
Start: 2022-05-05 | End: 2022-05-13

## 2022-05-05 RX ORDER — IPRATROPIUM BROMIDE AND ALBUTEROL SULFATE 2.5; .5 MG/3ML; MG/3ML
3 SOLUTION RESPIRATORY (INHALATION) EVERY 6 HOURS PRN
Status: DISCONTINUED | OUTPATIENT
Start: 2022-05-05 | End: 2022-05-05

## 2022-05-05 RX ORDER — GABAPENTIN 300 MG/1
300 CAPSULE ORAL NIGHTLY
Status: DISCONTINUED | OUTPATIENT
Start: 2022-05-05 | End: 2022-05-07

## 2022-05-05 RX ORDER — MUPIROCIN 20 MG/G
OINTMENT TOPICAL 2 TIMES DAILY
Status: DISPENSED | OUTPATIENT
Start: 2022-05-05 | End: 2022-05-10

## 2022-05-05 RX ORDER — NALOXONE HCL 0.4 MG/ML
0.02 VIAL (ML) INJECTION
Status: DISCONTINUED | OUTPATIENT
Start: 2022-05-05 | End: 2022-05-19 | Stop reason: HOSPADM

## 2022-05-05 RX ORDER — LEVOFLOXACIN 5 MG/ML
500 INJECTION, SOLUTION INTRAVENOUS
Status: DISCONTINUED | OUTPATIENT
Start: 2022-05-05 | End: 2022-05-07

## 2022-05-05 RX ORDER — IPRATROPIUM BROMIDE AND ALBUTEROL SULFATE 2.5; .5 MG/3ML; MG/3ML
3 SOLUTION RESPIRATORY (INHALATION) EVERY 6 HOURS
Status: DISCONTINUED | OUTPATIENT
Start: 2022-05-05 | End: 2022-05-06

## 2022-05-05 RX ADMIN — PIPERACILLIN AND TAZOBACTAM 3.38 G: 3; .375 INJECTION, POWDER, LYOPHILIZED, FOR SOLUTION INTRAVENOUS; PARENTERAL at 12:05

## 2022-05-05 RX ADMIN — DOXYCYCLINE 100 MG: 100 INJECTION, POWDER, LYOPHILIZED, FOR SOLUTION INTRAVENOUS at 04:05

## 2022-05-05 RX ADMIN — Medication 6 MG: at 08:05

## 2022-05-05 RX ADMIN — SODIUM CHLORIDE 250 ML: 0.9 INJECTION, SOLUTION INTRAVENOUS at 02:05

## 2022-05-05 RX ADMIN — ONDANSETRON 4 MG: 2 INJECTION INTRAMUSCULAR; INTRAVENOUS at 12:05

## 2022-05-05 RX ADMIN — CETIRIZINE HYDROCHLORIDE 10 MG: 10 TABLET, FILM COATED ORAL at 02:05

## 2022-05-05 RX ADMIN — FLUTICASONE PROPIONATE 100 MCG: 50 SPRAY, METERED NASAL at 02:05

## 2022-05-05 RX ADMIN — MINOXIDIL 5 MG: 2.5 TABLET ORAL at 08:05

## 2022-05-05 RX ADMIN — METOPROLOL TARTRATE 25 MG: 25 TABLET, FILM COATED ORAL at 08:05

## 2022-05-05 RX ADMIN — BENZONATATE 100 MG: 100 CAPSULE ORAL at 05:05

## 2022-05-05 RX ADMIN — BENZONATATE 100 MG: 100 CAPSULE ORAL at 12:05

## 2022-05-05 RX ADMIN — CLOSTRIDIUM TETANI TOXOID ANTIGEN (FORMALDEHYDE INACTIVATED), CORYNEBACTERIUM DIPHTHERIAE TOXOID ANTIGEN (FORMALDEHYDE INACTIVATED), BORDETELLA PERTUSSIS TOXOID ANTIGEN (GLUTARALDEHYDE INACTIVATED), BORDETELLA PERTUSSIS FILAMENTOUS HEMAGGLUTININ ANTIGEN (FORMALDEHYDE INACTIVATED), BORDETELLA PERTUSSIS PERTACTIN ANTIGEN, AND BORDETELLA PERTUSSIS FIMBRIAE 2/3 ANTIGEN 0.5 ML: 5; 2; 2.5; 5; 3; 5 INJECTION, SUSPENSION INTRAMUSCULAR at 12:05

## 2022-05-05 RX ADMIN — PIPERACILLIN AND TAZOBACTAM 3.38 G: 3; .375 INJECTION, POWDER, LYOPHILIZED, FOR SOLUTION INTRAVENOUS; PARENTERAL at 11:05

## 2022-05-05 RX ADMIN — CALCIUM ACETATE 1334 MG: 667 CAPSULE ORAL at 05:05

## 2022-05-05 RX ADMIN — PIPERACILLIN AND TAZOBACTAM 3.38 G: 3; .375 INJECTION, POWDER, LYOPHILIZED, FOR SOLUTION INTRAVENOUS; PARENTERAL at 02:05

## 2022-05-05 RX ADMIN — HYDROCODONE BITARTRATE AND ACETAMINOPHEN 1 TABLET: 5; 325 TABLET ORAL at 05:05

## 2022-05-05 RX ADMIN — HUMAN INSULIN 5 UNITS: 100 INJECTION, SOLUTION SUBCUTANEOUS at 02:05

## 2022-05-05 RX ADMIN — GABAPENTIN 300 MG: 300 CAPSULE ORAL at 08:05

## 2022-05-05 RX ADMIN — FLUTICASONE PROPIONATE 100 MCG: 50 SPRAY, METERED NASAL at 08:05

## 2022-05-05 RX ADMIN — HYDROCODONE BITARTRATE AND ACETAMINOPHEN 1 TABLET: 5; 325 TABLET ORAL at 08:05

## 2022-05-05 RX ADMIN — AZELASTINE HYDROCHLORIDE 137 MCG: 137 SPRAY, METERED NASAL at 08:05

## 2022-05-05 RX ADMIN — ACETAMINOPHEN 650 MG: 325 TABLET, FILM COATED ORAL at 12:05

## 2022-05-05 RX ADMIN — IPRATROPIUM BROMIDE AND ALBUTEROL SULFATE 3 ML: .5; 3 SOLUTION RESPIRATORY (INHALATION) at 07:05

## 2022-05-05 RX ADMIN — MUPIROCIN: 20 OINTMENT TOPICAL at 08:05

## 2022-05-05 RX ADMIN — ASPIRIN 81 MG: 81 TABLET, COATED ORAL at 08:05

## 2022-05-05 RX ADMIN — INSULIN ASPART 4 UNITS: 100 INJECTION, SOLUTION INTRAVENOUS; SUBCUTANEOUS at 08:05

## 2022-05-05 RX ADMIN — ATORVASTATIN CALCIUM 80 MG: 40 TABLET, FILM COATED ORAL at 08:05

## 2022-05-05 RX ADMIN — IPRATROPIUM BROMIDE AND ALBUTEROL SULFATE 3 ML: .5; 3 SOLUTION RESPIRATORY (INHALATION) at 01:05

## 2022-05-05 RX ADMIN — INSULIN DETEMIR 20 UNITS: 100 INJECTION, SOLUTION SUBCUTANEOUS at 08:05

## 2022-05-05 RX ADMIN — INSULIN ASPART 4 UNITS: 100 INJECTION, SOLUTION INTRAVENOUS; SUBCUTANEOUS at 02:05

## 2022-05-05 RX ADMIN — MEROPENEM AND SODIUM CHLORIDE 1 G: 1 INJECTION, SOLUTION INTRAVENOUS at 12:05

## 2022-05-05 RX ADMIN — THERA TABS 1 TABLET: TAB at 08:05

## 2022-05-05 RX ADMIN — ATORVASTATIN CALCIUM 80 MG: 40 TABLET, FILM COATED ORAL at 02:05

## 2022-05-05 RX ADMIN — POTASSIUM CHLORIDE 20 MEQ: 1500 TABLET, EXTENDED RELEASE ORAL at 12:05

## 2022-05-05 RX ADMIN — CALCIUM ACETATE 1334 MG: 667 CAPSULE ORAL at 08:05

## 2022-05-05 RX ADMIN — CALCIUM ACETATE 1334 MG: 667 CAPSULE ORAL at 12:05

## 2022-05-05 RX ADMIN — LEVOFLOXACIN 500 MG: 500 INJECTION, SOLUTION INTRAVENOUS at 05:05

## 2022-05-05 RX ADMIN — OXYCODONE HYDROCHLORIDE AND ACETAMINOPHEN 500 MG: 500 TABLET ORAL at 08:05

## 2022-05-05 NOTE — PLAN OF CARE
Problem: Adult Inpatient Plan of Care  Goal: Plan of Care Review  Outcome: Ongoing, Progressing  Goal: Patient-Specific Goal (Individualized)  Outcome: Ongoing, Progressing  Goal: Absence of Hospital-Acquired Illness or Injury  Outcome: Ongoing, Progressing  Goal: Optimal Comfort and Wellbeing  Outcome: Ongoing, Progressing  Goal: Readiness for Transition of Care  Outcome: Ongoing, Progressing     Problem: Diabetes Comorbidity  Goal: Blood Glucose Level Within Targeted Range  Outcome: Ongoing, Progressing     Problem: Adjustment to Illness (Sepsis/Septic Shock)  Goal: Optimal Coping  Outcome: Ongoing, Progressing     Problem: Bleeding (Sepsis/Septic Shock)  Goal: Absence of Bleeding  Outcome: Ongoing, Progressing     Problem: Glycemic Control Impaired (Sepsis/Septic Shock)  Goal: Blood Glucose Level Within Desired Range  Outcome: Ongoing, Progressing     Problem: Infection Progression (Sepsis/Septic Shock)  Goal: Absence of Infection Signs and Symptoms  Outcome: Ongoing, Progressing     Problem: Nutrition Impaired (Sepsis/Septic Shock)  Goal: Optimal Nutrition Intake  Outcome: Ongoing, Progressing     Problem: Fall Injury Risk  Goal: Absence of Fall and Fall-Related Injury  Outcome: Ongoing, Progressing

## 2022-05-05 NOTE — PT/OT/SLP EVAL
Speech Language Pathology Evaluation  Bedside Swallow    Patient Name:  Leanna García   MRN:  [id number]  Admitting Diagnosis: Sepsis    Recommendations:                 General Recommendations:    · Will follow 1-2x to monitor swallow safety as clinical course and workup continue   · Outpatient GI consultation: history of suspected cervical esophageal dysphagia     Diet recommendations:  1) IDDSI 7, easy to chew  2) Thin liquids     Aspiration Precautions:   ·  HOB elevated, upright   · Meds whole one at a time w/ liquid or buried in applesauce; Pt preference or RN discretion     General Precautions: Standard,      History:       HPI: Ms. García is a 56-year-old female with a past medical history of ESRD on HD Monday Wednesday Friday, hypertension, IDDM2, PAF, and chronic cough who presents today with complaints of fever up to 103. It is severe.  It is associated with cough, posttussive vomiting, fatigue, sinus congestion, weakness, and a left foot ulcer.  She denies chest pain, diarrhea, dizziness, loss of consciousness.  In the ED she was noted to have a large left foot ulceration at the plantar surface and on the side of the foot.  CT of abdomen pelvis also reveals possible enteritis and bilateral perinephric fat stranding.  WBCs 21 K, troponin is 0.4 in the setting of ESRD.  She did not go to dialysis today she felt too bad.  Glucose is 446, anion gap 20, bicarb 24.     Imaging Results          X-Ray Foot Complete Left (Final result)  Result time 05/05/22 06:12:52    Final result by Denise Estrada MD (05/05/22 06:12:52)                 Narrative:    Three views of the left foot are compared to prior study dated 12/3/2021    Clinical history is infection    There is osteopenia. There has been prior amputation of the fourth digit at the base of the proximal phalanx.    There are moderate degenerative changes of the midfoot and hindfoot with joint space narrowing and spurring.. There are no fractures,  dislocations or osteolysis. There is a subcutaneous ulceration the mid plantar soft tissues. There is moderate peripheral vascular calcification.    IMPRESSION: Prior amputation of fourth digit without radiographic evidence for osteomyelitis.    Moderate degenerative changes of the foot    16mm soft tissue ulceration in the mid plantar medial soft tissues    Electronically signed by:  Denise Estrada MD  5/5/2022 6:12 AM CDT Workstation: [id number]                             CT Abdomen Pelvis  Without Contrast (Final result)  Result time 05/04/22 21:53:02    Final result by Ernesto Lino MD (05/04/22 21:53:02)                 Narrative:    EXAM: CT ABDOMEN PELVIS WITHOUT CONTRAST    RadLex: CT ABDOMEN PELVIS WITHOUT IV CONTRAST    HISTORY: 56 years  Female;  Abdominal pain, acute, nonlocalized;    TECHNIQUE:   Standard CT of the abdomen and pelvis was performed without the use of intravenous contrast media. Lack of intravenous contrast limits evaluation of soft tissue structures in this study.    CT scans at this facility use dose modulation, iterative reconstruction and/or weight based dosing when appropriate to reduce radiation dose to as low as reasonably achievable.    COMPARISON: None available    FINDING(S):    ABDOMEN:    Lung bases show dependent atelectasis. Coronary artery calcifications are seen. Cardiac size normal. Mild thickening of the distal esophagus with small hiatal hernia.    Small amount of fluid is seen in the stomach. Mild thickening of the stomach lining.    Mild hepatic steatosis. Spleen, pancreas, and both adrenals are normal.    There is gallbladder distention with multiple gallstones.    Neither kidney shows hydronephrosis. There is moderate bilateral perinephric stranding which appears above expected and may represent inflammation. Correlate with urinalysis to exclude the possibility of pyelonephritis.    There are vascular calcifications involving both kidneys. There may be  separate small stones as well. The ureters themselves are decompressed.    Marked atherosclerosis affects the aorta and the major branch vessels. No abdominal lymphadenopathy or free abdominal fluid.    The small intestines contain a small to moderate amount of fluid with scattered air-fluid levels. No transition point to suggest obstruction.    No evidence of appendicitis. Oral contrast is seen in the intestines. There is a mild to moderate stool burden. Scattered colonic diverticulosis.    PELVIS:    Bladder is decompressed. No free fluid within the pelvis. There is an enlarged left external iliac lymph node measuring 1.6 x 0.8 cm on series 3, image 210.    No other groin or pelvis lymphadenopathy. A small left inguinal hernia contains only fat.    Small umbilical hernia contains only fat.    There are degenerative changes in the lower lumbar spine.    IMPRESSION:    1.  Small to moderate amount of fluid in the small intestines with scattered air fluid levels, possibly enteritis  2.  Moderate bilateral perinephric stranding. Correlate with urinalysis to exclude the possibility of pyelonephritis  3.  Gallbladder distention with gallstones    Electronically signed by:  Ernesto Lino MD  5/4/2022 9:53 PM CDT Workstation: [id number]                             X-Ray Chest 1 View (Final result)  Result time 05/05/22 06:10:56    Final result by Denise Estrada MD (05/05/22 06:10:56)                 Narrative:    XR CHEST 1 VIEW    CLINICAL HISTORY:  56 years Female pain    COMPARISON: 5/19/2021    FINDINGS: Cardiomediastinal silhouette is within normal limits. Lungs are normally expanded with no airspace consolidation. No pleural effusion or pneumothorax. No acute osseous abnormality.    IMPRESSION: No acute pulmonary process.    Electronically signed by:  Denise Estrada MD  5/5/2022 6:10 AM CDT Workstation: [id number]                                MBS COMPLETED 5/2021  Impressions  ·  mild-moderate  "oral-pharyngeal dysphagia (primarily pharyngeal in nature); some degree of cervical esophageal dysphagia; can't rule out further esophageal dysphagia -- consider GI referral if warranted   · No visible aspiration during study but some risk due to extent of pharyngeal residue (potential for chronic microaspiration after swallows from pharyngeal residue)     Issues contributing to risk:  · Some fatigue after dialysis that may impact swallow safety  · Can't rule out esophageal dysphagia as negatively impacting overall swallow safety  · Hx of stroke    General Recommendations:  Dysphagia therapy/education and aspiration precautions; consider GI referral due to esophagus appearing to be atypically dilated under cricopharyngeus (would need GI/radiologist to confirm), slightly bulging cricopharyngeus (which did not block solids during test -- however pt gives report of occasional feeling of solids getting "stuck" -- she points to sternum area to show area of sensation)      Past Medical History:   Diagnosis Date    A-fib     resolved per patient    Arthritis     Bronchitis     Cardiovascular event risk, ASCVD 10-year risk 6.7% 10/15/2016    Diabetes mellitus     Diabetes mellitus type II     Disorder of kidney and ureter     Encounter for blood transfusion     History of colon polyps 11/02/2016    Hyperlipidemia     Hypertension     Stroke        Past Surgical History:   Procedure Laterality Date    COLONOSCOPY N/A 10/5/2016    Procedure: COLONOSCOPY;  Surgeon: Pillo Chanel MD;  Location: [facility] ENDO;  Service: Endoscopy;  Laterality: N/A;    COLONOSCOPY N/A 4/26/2022    Procedure: COLONOSCOPY;  Surgeon: Saravanan Rachel MD;  Location: [facility] ENDO;  Service: Endoscopy;  Laterality: N/A;    HERNIA REPAIR Bilateral 11/22/2016    inguinal    HYSTERECTOMY      OOPHORECTOMY       Subjective     Pt alert, cooperative   Patient goals: none stated      Pain/Comfort:  ·  None indicated     Respiratory Status: " Room air    Objective:     Oral Musculature Evaluation  ·  right sided downturn of eye   · Symmetrical movement during smile and oral motor movements  · Tongue protrudes midline, equal lateralization with ability to lift and retract  · Bilateral and equal sensation to forehead, cheek & jaw   · Speech 100% intelligible     Bedside Swallow Eval:   Consistencies Assessed:  · Thin liquid: ice chip x2, teaspoon x2, 3oz via consecutive straw sips, self regulated cup  · Puree: tsp x2  · Solid: 1/4 cracker x2     Oral Phase:   · Intact labial seal to cup edge, straw and for spoon stripping   · Adequate oral containment without anterior spillage across consistencies   · Prolonged mastication  · Mild residue with dry solid, cleared w/ liquid wash     Pharyngeal Phase:   · Single dry, delayed cough with progression of oral trials: dry coughing noted prior to oral presentations   · Clear vocal quality maintained   · Completed uninterrupted drinking of 3 oz water with no overt s/s of aspiration (i.e., immediate coughing or choking), passing 3oz water challenge. Risk of aspiration not indicated although cannot be ruled out at bedside.   · Multiple swallows not observed across consistencies trialled.   · Globus sensation not promoted this session     Compensatory Strategies  · None     Cognitive Communication: Alert and oriented x4. Degree of restlessness and distractibility. Disorganized thought process with difficulty providing concise responses, frequently tangential.     Assessment:     Leanna García is a 56 y.o. female with an SLP diagnosis of dysphagia history with MBS completed 5/2021. Will follow to monitor, recommend GI as OP.     Goals:   Multidisciplinary Problems     SLP Goals        Problem: SLP    Goal Priority Disciplines Outcome   SLP Goal     SLP Ongoing, Progressing   Description: 1. Pt will tolerate least restrictive PO diet without acute dysphagia pulmonary complication.                        Plan:           · SLP Follow-Up:     Yes      Discharge recommendations:    GI     Time Tracking:     SLP Treatment Date:   05/05/22  Speech Start Time:  1000  Speech Stop Time:  1014     Speech Total Time (min):  14 min    Billable Minutes: Eval Swallow and Oral Function 14 05/05/2022

## 2022-05-05 NOTE — NURSING
Pt appears to have trouble initiating swallow and coughs frequently with both think liquid and solid food attempts. Pt made NPO and speech consult placed for further evaluation. DEANDRE Stacy MD notified via [employer] system.

## 2022-05-05 NOTE — PLAN OF CARE
Problem: SLP  Goal: SLP Goal  Description: 1. Pt will tolerate least restrictive PO diet without acute dysphagia pulmonary complication.       5/5/2022 1021 by Bhavin Hernandez CCC-SLP  Outcome: Ongoing, Progressing

## 2022-05-05 NOTE — HPI
Ms. García is a 56-year-old female with a past medical history of ESRD on HD Monday Wednesday Friday, hypertension, IDDM2, PAF, and chronic cough who presents today with complaints of fever up to 103. It is severe.  It is associated with cough, posttussive vomiting, fatigue, sinus congestion, weakness, and a left foot ulcer.  She denies chest pain, diarrhea, dizziness, loss of consciousness.  In the ED she was noted to have a large left foot ulceration at the plantar surface and on the side of the foot.  CT of abdomen pelvis also reveals possible enteritis and bilateral perinephric fat stranding.  WBCs 21 K, troponin is 0.4 in the setting of ESRD.  She did not go to dialysis today she felt too bad.  Glucose is 446, anion gap 20, bicarb 24.    Dressing: bandage Post-Care Instructions: I reviewed with the patient in detail post-care instructions. Patient is to keep the biopsy site dry overnight, and then apply bacitracin twice daily until healed. Patient may apply hydrogen peroxide soaks to remove any crusting. Silver Nitrate Text: The wound bed was treated with silver nitrate after the biopsy was performed. Hemostasis: Drysol Billing Type: Third-Party Bill Depth Of Biopsy: dermis Size Of Lesion In Cm: 1.4 Type Of Destruction Used: Curettage Biopsy Method: Dermablade X Size Of Lesion In Cm: 0 Cryotherapy Text: The wound bed was treated with cryotherapy after the biopsy was performed. Bill For Surgical Tray: no Anesthesia Volume In Cc: 0.5 Electrodesiccation Text: The wound bed was treated with electrodesiccation after the biopsy was performed. Electrodesiccation And Curettage Text: The wound bed was treated with electrodesiccation and curettage after the biopsy was performed. Wound Care: Bacitracin Biopsy Type: H and E Curettage Text: The wound bed was treated with curettage after the biopsy was performed. Was A Bandage Applied: Yes Body Location Override (Optional - Billing Will Still Be Based On Selected Body Map Location If Applicable): left pretibia Consent: Written consent was obtained and risks were reviewed including but not limited to scarring, infection, bleeding, scabbing, incomplete removal, nerve damage and allergy to anesthesia. Notification Instructions: Patient will be notified of biopsy results. However, patient instructed to call the office if not contacted within 2 weeks. Detail Level: Detailed Anesthesia Type: 1% lidocaine with epinephrine

## 2022-05-05 NOTE — PLAN OF CARE
Problem: Adult Inpatient Plan of Care  Goal: Plan of Care Review  Outcome: Ongoing, Progressing  Goal: Patient-Specific Goal (Individualized)  Outcome: Ongoing, Progressing  Goal: Absence of Hospital-Acquired Illness or Injury  Outcome: Ongoing, Progressing  Goal: Optimal Comfort and Wellbeing  Outcome: Ongoing, Progressing  Goal: Readiness for Transition of Care  Outcome: Ongoing, Progressing     Problem: Diabetes Comorbidity  Goal: Blood Glucose Level Within Targeted Range  Outcome: Ongoing, Progressing     Problem: Adjustment to Illness (Sepsis/Septic Shock)  Goal: Optimal Coping  Outcome: Ongoing, Progressing     Problem: Bleeding (Sepsis/Septic Shock)  Goal: Absence of Bleeding  Outcome: Ongoing, Progressing     Problem: Glycemic Control Impaired (Sepsis/Septic Shock)  Goal: Blood Glucose Level Within Desired Range  Outcome: Ongoing, Progressing     Problem: Infection Progression (Sepsis/Septic Shock)  Goal: Absence of Infection Signs and Symptoms  Outcome: Ongoing, Progressing     Problem: Nutrition Impaired (Sepsis/Septic Shock)  Goal: Optimal Nutrition Intake  Outcome: Ongoing, Progressing     Problem: Fall Injury Risk  Goal: Absence of Fall and Fall-Related Injury  Outcome: Ongoing, Progressing     Problem: Skin Injury Risk Increased  Goal: Skin Health and Integrity  Outcome: Ongoing, Progressing     Problem: Device-Related Complication Risk (Hemodialysis)  Goal: Safe, Effective Therapy Delivery  Outcome: Ongoing, Progressing     Problem: Hemodynamic Instability (Hemodialysis)  Goal: Effective Tissue Perfusion  Outcome: Ongoing, Progressing     Problem: Infection (Hemodialysis)  Goal: Absence of Infection Signs and Symptoms  Outcome: Ongoing, Progressing

## 2022-05-05 NOTE — CONSULTS
INPATIENT NEPHROLOGY CONSULT   [facility] NEPHROLOGY    Leanna García  05/05/2022    Reason for consultation:    esrd    Chief Complaint:   Chief Complaint   Patient presents with    Fever          History of Present Illness:    Per H and P    Ms. García is a 56-year-old female with a past medical history of ESRD on HD Monday Wednesday Friday, hypertension, IDDM2, PAF, and chronic cough who presents today with complaints of fever up to 103. It is severe.  It is associated with cough, posttussive vomiting, fatigue, sinus congestion, weakness, and a left foot ulcer.  She denies chest pain, diarrhea, dizziness, loss of consciousness.  In the ED she was noted to have a large left foot ulceration at the plantar surface and on the side of the foot.  CT of abdomen pelvis also reveals possible enteritis and bilateral perinephric fat stranding.  WBCs 21 K, troponin is 0.4 in the setting of ESRD.  She did not go to dialysis today she felt too bad.  Glucose is 446, anion gap 20, bicarb 24.     5/5  C/o foot pain.  Mild dyspnea.  No vomiting, chest pain, urinary or bowel complaints.  Weak    Plan of Care:       Assessment:    esrd  --continue dialysis per routine  --fluid restrict  --renal dose medication per routine  --continue outpt medication  --continue binders with meals  --run today and then mw (missed yesterday)    Anemia  --erythropoiesis stimulating agent with renal replacement therapy    Hypertension  --fluid restrict  --low salt diet  --uf with hd  --continue minoxidil and metoprolol     Hypokalemia  --4 K bath today              Thank you for allowing us to participate in this patient's care. We will continue to follow.    Vital Signs:  Temp Readings from Last 3 Encounters:   05/05/22 97.7 °F (36.5 °C) (Oral)   04/26/22 98.2 °F (36.8 °C)   03/10/22 97.3 °F (36.3 °C) (Tympanic)       Pulse Readings from Last 3 Encounters:   05/05/22 83   04/26/22 66   04/05/22 75       BP Readings from Last 3 Encounters:    05/05/22 (!) 160/55   04/28/22 (!) 180/82   04/26/22 (!) 174/66       Weight:  Wt Readings from Last 3 Encounters:   05/05/22 90.4 kg (199 lb 3.2 oz)   04/28/22 94 kg (207 lb 5.5 oz)   04/26/22 93 kg (205 lb)       Past Medical & Surgical History:  Past Medical History:   Diagnosis Date    A-fib     resolved per patient    Arthritis     Bronchitis     Cardiovascular event risk, ASCVD 10-year risk 6.7% 10/15/2016    Diabetes mellitus     Diabetes mellitus type II     Disorder of kidney and ureter     Encounter for blood transfusion     History of colon polyps 11/02/2016    Hyperlipidemia     Hypertension     Stroke        Past Surgical History:   Procedure Laterality Date    COLONOSCOPY N/A 10/5/2016    Procedure: COLONOSCOPY;  Surgeon: Pillo Chanel MD;  Location: [facility] ENDO;  Service: Endoscopy;  Laterality: N/A;    COLONOSCOPY N/A 4/26/2022    Procedure: COLONOSCOPY;  Surgeon: Saravanan Rachel MD;  Location: [facility] ENDO;  Service: Endoscopy;  Laterality: N/A;    HERNIA REPAIR Bilateral 11/22/2016    inguinal    HYSTERECTOMY      OOPHORECTOMY         Past Social History:  Social History     Socioeconomic History    Marital status:    Tobacco Use    Smoking status: Never Smoker    Smokeless tobacco: Never Used   Substance and Sexual Activity    Alcohol use: No    Drug use: No    Sexual activity: Yes     Partners: Male   Social History Narrative    Caregiver      Social Determinants of Health     Financial Resource Strain: Low Risk     Difficulty of Paying Living Expenses: Not very hard   Food Insecurity: Unknown    Worried About Running Out of Food in the Last Year: Patient refused    Ran Out of Food in the Last Year: Patient refused   Transportation Needs: Unknown    Lack of Transportation (Medical): Patient refused    Lack of Transportation (Non-Medical): Patient refused   Physical Activity: Insufficiently Active    Days of Exercise per Week: 7 days    Minutes of  Exercise per Session: 20 min   Stress: No Stress Concern Present    Feeling of Stress : Only a little   Social Connections: Unknown    Frequency of Communication with Friends and Family: Patient refused    Frequency of Social Gatherings with Friends and Family: Patient refused    Active Member of Clubs or Organizations: No    Marital Status:        Medications:  No current facility-administered medications on file prior to encounter.     Current Outpatient Medications on File Prior to Encounter   Medication Sig Dispense Refill    ascorbic acid, vitamin C, (VITAMIN C) 500 MG tablet Take 500 mg by mouth once daily.      aspirin (ECOTRIN) 81 MG EC tablet Take 81 mg by mouth once daily.      atorvastatin (LIPITOR) 80 MG tablet Take 1 tablet (80 mg total) by mouth once daily. (Patient taking differently: Take 80 mg by mouth every evening.) 90 tablet 3    blood sugar diagnostic Strp To check BG 4 times daily, to use with insurance preferred meter (Patient taking differently: 1 strip by Misc.(Non-Drug; Combo Route) route 4 (four) times daily. To check BG 4 times daily, to use with insurance preferred meter) 450 strip 3    insulin aspart U-100 (NOVOLOG FLEXPEN U-100 INSULIN) 100 unit/mL (3 mL) InPn pen Inject 5-8 units 3 times per day with food. 1 Box 6    insulin detemir U-100 (LEVEMIR FLEXTOUCH U-100 INSULN) 100 unit/mL (3 mL) InPn pen Inject 15-18 Units into the skin once daily. 1 Box 6    lancets Misc To use to check blood sugar 4 times daily. To use with insurance approved meter. Patient needs the round, purple one (Patient taking differently: 1 lancet by Misc.(Non-Drug; Combo Route) route 4 (four) times daily. To use to check blood sugar 4 times daily. To use with insurance approved meter. Patient needs the round, purple one) 450 each 3    metoprolol tartrate (LOPRESSOR) 25 MG tablet Take 25 mg by mouth 2 (two) times daily.      minoxidiL (LONITEN) 2.5 MG tablet Take 5 mg by mouth 2 (two) times  "daily.      multivitamin (THERAGRAN) per tablet Take 1 tablet by mouth once daily.      pen needle, diabetic (BD ULTRA-FINE ROBERT PEN NEEDLE) 32 gauge x 5/32" Ndle To use 4 times per day with insulin injections. (Patient taking differently: 1 pen by Misc.(Non-Drug; Combo Route) route 4 (four) times daily. To use 4 times per day with insulin injections.) 450 each 2    sucroferric oxyhydroxide (VELPHORO) 500 mg Chew Take 500 mg by mouth 3 (three) times daily.      dextrose (GLUCOSE GEL) 40 % gel Take 37.5 mLs (15,000 mg total) by mouth once as needed (hypoglycemia). 37.5 g 4    gabapentin (NEURONTIN) 300 MG capsule Take 300 mg by mouth every evening.      glucagon (BAQSIMI) 3 mg/actuation Spry 3 mg (one actuation) into a single nostril; if no response, may repeat in 15 minutes using a new intranasal device. (Patient taking differently: 3 mg by Left Nostril route as needed. 3 mg (one actuation) into a single nostril; if no response, may repeat in 15 minutes using a new intranasal device.) 2 each 1    [DISCONTINUED] blood-glucose meter (ACCU-CHEK KAYLIE PLUS METER) Misc To use to check blood sugars 4 times a day 1 each 0    [DISCONTINUED] clorazepate (TRANXENE) 3.75 MG Tab Take 7.5 mg by mouth nightly as needed.       [DISCONTINUED] fenofibrate 160 MG Tab Take 1 tablet (160 mg total) by mouth once daily. 90 tablet 3    [DISCONTINUED] lancing device with lancets (ACCU-CHEK SOFT DEV LANCETS) Kit To check blood sugars 4 times per day 400 each 3     Scheduled Meds:   ascorbic acid (vitamin C)  500 mg Oral Daily    aspirin  81 mg Oral Daily    atorvastatin  80 mg Oral QHS    calcium acetate(phosphat bind)  1,334 mg Oral TID WM    cetirizine  10 mg Oral Every other day    doxycycline (VIBRAMYCIN) IVPB  100 mg Intravenous Q12H    fluticasone propionate  2 spray Each Nostril Daily    gabapentin  300 mg Oral QHS    insulin detemir U-100  20 Units Subcutaneous QHS    metoprolol tartrate  25 mg Oral BID    " "minoxidiL  5 mg Oral BID    multivitamin  1 tablet Oral Daily    piperacillin-tazobactam (ZOSYN) IVPB  3.375 g Intravenous Q12H     Continuous Infusions:  PRN Meds:.acetaminophen, albuterol-ipratropium, benzonatate, dextrose 50%, dextrose 50%, HYDROcodone-acetaminophen, insulin aspart U-100, melatonin, naloxone, ondansetron, polyethylene glycol, sodium chloride 0.9%    Allergies:  Vancomycin and Chlorhexidine    Past Family History:  Reviewed; refer to Hospitalist Admission Note    Review of Systems:  Review of Systems - All 14 systems reviewed and negative, except as noted in HPI    Physical Exam:    BP (!) 160/55 (BP Location: Right arm, Patient Position: Lying)   Pulse 83   Temp 97.7 °F (36.5 °C) (Oral)   Resp 18   Ht 5' 7" (1.702 m)   Wt 90.4 kg (199 lb 3.2 oz)   SpO2 98%   BMI 31.20 kg/m²     General Appearance:    Alert, cooperative, no distress, appears stated age   Head:    Normocephalic, without obvious abnormality, atraumatic   Eyes:    PER, conjunctiva/corneas clear, EOM's intact in both eyes        Throat:   Lips, mucosa, and tongue normal; teeth and gums normal   Back:     Symmetric, no curvature, ROM normal, no CVA tenderness   Lungs:     Clear to auscultation bilaterally, respirations unlabored   Chest wall:    No tenderness or deformity   Heart:    Regular rate and rhythm, S1 and S2 normal, no murmur, rub   or gallop   Abdomen:     Soft, non-tender, bowel sounds active all four quadrants,     no masses, no organomegaly   Extremities:   Extremities normal, atraumatic, no cyanosis or edema   Pulses:   2+ and symmetric all extremities   MSK:   No joint or muscle swelling, tenderness or deformity   Skin:   Skin color, texture, turgor normal, no rashes or lesions   Neurologic:   CNII-XII intact, normal strength and sensation       Throughout.  No flap     Results:  Lab Results   Component Value Date     (L) 05/05/2022    K 3.4 (L) 05/05/2022    CL 84 (L) 05/05/2022    CO2 25 05/05/2022    " BUN 51 (H) 05/05/2022    CREATININE 11.0 (H) 05/05/2022    CALCIUM 9.1 05/05/2022    ANIONGAP 22 (H) 05/05/2022    ESTGFRAFRICA 4.0 (A) 05/05/2022    EGFRNONAA 3.5 (A) 05/05/2022       Lab Results   Component Value Date    CALCIUM 9.1 05/05/2022    PHOS 4.2 03/10/2022       Recent Labs   Lab 05/05/22  0320   WBC 18.99*   RBC 3.24*   HGB 9.0*   HCT 27.4*      MCV 85   MCH 27.8   MCHC 32.8        Imaging Results          X-Ray Foot Complete Left (Final result)  Result time 05/05/22 06:12:52    Final result by Denise Estrada MD (05/05/22 06:12:52)                 Narrative:    Three views of the left foot are compared to prior study dated 12/3/2021    Clinical history is infection    There is osteopenia. There has been prior amputation of the fourth digit at the base of the proximal phalanx.    There are moderate degenerative changes of the midfoot and hindfoot with joint space narrowing and spurring.. There are no fractures, dislocations or osteolysis. There is a subcutaneous ulceration the mid plantar soft tissues. There is moderate peripheral vascular calcification.    IMPRESSION: Prior amputation of fourth digit without radiographic evidence for osteomyelitis.    Moderate degenerative changes of the foot    16mm soft tissue ulceration in the mid plantar medial soft tissues    Electronically signed by:  Denise Estrada MD  5/5/2022 6:12 AM CDT Workstation: [id number]                             CT Abdomen Pelvis  Without Contrast (Final result)  Result time 05/04/22 21:53:02    Final result by Ernesto Lino MD (05/04/22 21:53:02)                 Narrative:    EXAM: CT ABDOMEN PELVIS WITHOUT CONTRAST    RadLex: CT ABDOMEN PELVIS WITHOUT IV CONTRAST    HISTORY: 56 years  Female;  Abdominal pain, acute, nonlocalized;    TECHNIQUE:   Standard CT of the abdomen and pelvis was performed without the use of intravenous contrast media. Lack of intravenous contrast limits evaluation of soft tissue structures  in this study.    CT scans at this facility use dose modulation, iterative reconstruction and/or weight based dosing when appropriate to reduce radiation dose to as low as reasonably achievable.    COMPARISON: None available    FINDING(S):    ABDOMEN:    Lung bases show dependent atelectasis. Coronary artery calcifications are seen. Cardiac size normal. Mild thickening of the distal esophagus with small hiatal hernia.    Small amount of fluid is seen in the stomach. Mild thickening of the stomach lining.    Mild hepatic steatosis. Spleen, pancreas, and both adrenals are normal.    There is gallbladder distention with multiple gallstones.    Neither kidney shows hydronephrosis. There is moderate bilateral perinephric stranding which appears above expected and may represent inflammation. Correlate with urinalysis to exclude the possibility of pyelonephritis.    There are vascular calcifications involving both kidneys. There may be separate small stones as well. The ureters themselves are decompressed.    Marked atherosclerosis affects the aorta and the major branch vessels. No abdominal lymphadenopathy or free abdominal fluid.    The small intestines contain a small to moderate amount of fluid with scattered air-fluid levels. No transition point to suggest obstruction.    No evidence of appendicitis. Oral contrast is seen in the intestines. There is a mild to moderate stool burden. Scattered colonic diverticulosis.    PELVIS:    Bladder is decompressed. No free fluid within the pelvis. There is an enlarged left external iliac lymph node measuring 1.6 x 0.8 cm on series 3, image 210.    No other groin or pelvis lymphadenopathy. A small left inguinal hernia contains only fat.    Small umbilical hernia contains only fat.    There are degenerative changes in the lower lumbar spine.    IMPRESSION:    1.  Small to moderate amount of fluid in the small intestines with scattered air fluid levels, possibly enteritis  2.   Moderate bilateral perinephric stranding. Correlate with urinalysis to exclude the possibility of pyelonephritis  3.  Gallbladder distention with gallstones    Electronically signed by:  Ernesto Lino MD  5/4/2022 9:53 PM CDT Workstation: [id number]                             X-Ray Chest 1 View (Final result)  Result time 05/05/22 06:10:56    Final result by Denise Estrada MD (05/05/22 06:10:56)                 Narrative:    XR CHEST 1 VIEW    CLINICAL HISTORY:  56 years Female pain    COMPARISON: 5/19/2021    FINDINGS: Cardiomediastinal silhouette is within normal limits. Lungs are normally expanded with no airspace consolidation. No pleural effusion or pneumothorax. No acute osseous abnormality.    IMPRESSION: No acute pulmonary process.    Electronically signed by:  Denise Estrada MD  5/5/2022 6:10 AM CDT Workstation: [id number]                                I have personally reviewed pertinent radiological imaging and reports.     Patient care was time spent personally by me on the following activities:   · Obtaining a history  · Examination of patient.  · Providing medical care at the patients bedside.  · Developing a treatment plan with patient or surrogate and bedside caregivers  · Ordering and reviewing laboratory studies, radiographic studies, pulse oximetry.  · Ordering and performing treatments and interventions.  · Evaluation of patient's response to treatment.  · Discussions with consultants while on the unit and immediately available to the patient.  · Re-evaluation of the patient's condition.  · Documentation in the medical record.     Salvador Carlson MD  Nephrology  [facility] [place]  [phone]

## 2022-05-05 NOTE — SUBJECTIVE & OBJECTIVE
Past Medical History:   Diagnosis Date    A-fib     resolved per patient    Arthritis     Bronchitis     Cardiovascular event risk, ASCVD 10-year risk 6.7% 10/15/2016    Diabetes mellitus     Diabetes mellitus type II     Disorder of kidney and ureter     Encounter for blood transfusion     History of colon polyps 11/02/2016    Hyperlipidemia     Hypertension     Stroke        Past Surgical History:   Procedure Laterality Date    COLONOSCOPY N/A 10/5/2016    Procedure: COLONOSCOPY;  Surgeon: Pillo Chanel MD;  Location: [facility] END[facility];  Service: Endoscopy;  Laterality: N/A;    COLONOSCOPY N/A 4/26/2022    Procedure: COLONOSCOPY;  Surgeon: Saravanan Rachel MD;  Location: [facility] ENDO;  Service: Endoscopy;  Laterality: N/A;    HERNIA REPAIR Bilateral 11/22/2016    inguinal    HYSTERECTOMY      OOPHORECTOMY         Review of patient's allergies indicates:   Allergen Reactions    Vancomycin Itching    Chlorhexidine Itching       No current facility-administered medications on file prior to encounter.     Current Outpatient Medications on File Prior to Encounter   Medication Sig    ascorbic acid, vitamin C, (VITAMIN C) 500 MG tablet Take 500 mg by mouth once daily.    aspirin (ECOTRIN) 81 MG EC tablet Take 81 mg by mouth once daily.    atorvastatin (LIPITOR) 80 MG tablet Take 1 tablet (80 mg total) by mouth once daily. (Patient taking differently: Take 80 mg by mouth every evening.)    blood sugar diagnostic Strp To check BG 4 times daily, to use with insurance preferred meter (Patient taking differently: 1 strip by Misc.(Non-Drug; Combo Route) route 4 (four) times daily. To check BG 4 times daily, to use with insurance preferred meter)    insulin aspart U-100 (NOVOLOG FLEXPEN U-100 INSULIN) 100 unit/mL (3 mL) InPn pen Inject 5-8 units 3 times per day with food.    insulin detemir U-100 (LEVEMIR FLEXTOUCH U-100 INSULN) 100 unit/mL (3 mL) InPn pen Inject 15-18 Units into the skin once daily.    lancets Misc To use to check  "blood sugar 4 times daily. To use with insurance approved meter. Patient needs the round, purple one (Patient taking differently: 1 lancet by Misc.(Non-Drug; Combo Route) route 4 (four) times daily. To use to check blood sugar 4 times daily. To use with insurance approved meter. Patient needs the round, purple one)    metoprolol tartrate (LOPRESSOR) 25 MG tablet Take 25 mg by mouth 2 (two) times daily.    minoxidiL (LONITEN) 2.5 MG tablet Take 5 mg by mouth 2 (two) times daily.    multivitamin (THERAGRAN) per tablet Take 1 tablet by mouth once daily.    pen needle, diabetic (BD ULTRA-FINE ROBERT PEN NEEDLE) 32 gauge x 5/32" Ndle To use 4 times per day with insulin injections. (Patient taking differently: 1 pen by Misc.(Non-Drug; Combo Route) route 4 (four) times daily. To use 4 times per day with insulin injections.)    sucroferric oxyhydroxide (VELPHORO) 500 mg Chew Take 500 mg by mouth 3 (three) times daily.    dextrose (GLUCOSE GEL) 40 % gel Take 37.5 mLs (15,000 mg total) by mouth once as needed (hypoglycemia).    gabapentin (NEURONTIN) 300 MG capsule Take 300 mg by mouth every evening.    glucagon (BAQSIMI) 3 mg/actuation Spry 3 mg (one actuation) into a single nostril; if no response, may repeat in 15 minutes using a new intranasal device. (Patient taking differently: 3 mg by Left Nostril route as needed. 3 mg (one actuation) into a single nostril; if no response, may repeat in 15 minutes using a new intranasal device.)    [DISCONTINUED] amLODIPine (NORVASC) 10 MG tablet Take 10 mg by mouth once daily.    [DISCONTINUED] blood-glucose meter (ACCU-CHEK KAYLIE PLUS METER) Misc To use to check blood sugars 4 times a day    [DISCONTINUED] clorazepate (TRANXENE) 3.75 MG Tab Take 7.5 mg by mouth nightly as needed.     [DISCONTINUED] doxercalciferoL (HECTOROL) 4 mcg/2 mL injection Inject 8 mcg into the vein 3 (three) times a week.    [DISCONTINUED] epoetin beta, methoxy peg (MIRCERA) 30 mcg/0.3 mL Syrg Inject 30 mcg as " directed every 28 days.    [DISCONTINUED] ergocalciferol (ERGOCALCIFEROL) 50,000 unit Cap Take 50,000 Units by mouth every 30 days.    [DISCONTINUED] EScitalopram oxalate (LEXAPRO) 10 MG tablet Take 10 mg by mouth once daily.    [DISCONTINUED] ezetimibe (ZETIA) 10 mg tablet Take 10 mg by mouth once daily.    [DISCONTINUED] fenofibrate 160 MG Tab Take 1 tablet (160 mg total) by mouth once daily.    [DISCONTINUED] lancing device with lancets (ACCU-CHEK SOFT DEV LANCETS) Kit To check blood sugars 4 times per day    [DISCONTINUED] losartan (COZAAR) 25 MG tablet Take 25 mg by mouth once daily.     [DISCONTINUED] pantoprazole (PROTONIX) 40 MG tablet Take 40 mg by mouth once daily.     Family History       Problem Relation (Age of Onset)    Cancer Paternal Grandmother    Diabetes Father, Sister, Sister, Maternal Aunt, Maternal Grandmother    Heart disease Maternal Grandmother    Hyperlipidemia Mother    Hypertension Mother, Father          Tobacco Use    Smoking status: Never Smoker    Smokeless tobacco: Never Used   Substance and Sexual Activity    Alcohol use: No    Drug use: No    Sexual activity: Yes     Partners: Male     Review of Systems   Constitutional:  Positive for chills, diaphoresis, fatigue and fever. Negative for activity change, appetite change and unexpected weight change.   HENT:  Negative for congestion, ear pain, facial swelling, hearing loss, sore throat and trouble swallowing.    Eyes:  Negative for pain and discharge.   Respiratory:  Positive for cough and shortness of breath. Negative for chest tightness and wheezing.    Cardiovascular:  Negative for chest pain, palpitations and leg swelling.   Gastrointestinal:  Positive for nausea and vomiting. Negative for abdominal pain, blood in stool and diarrhea.   Endocrine: Negative for polydipsia, polyphagia and polyuria.   Genitourinary:  Negative for difficulty urinating, dysuria, flank pain, frequency and urgency.   Musculoskeletal:  Negative for  arthralgias, back pain, joint swelling, neck pain and neck stiffness.   Skin:  Positive for wound. Negative for rash.   Allergic/Immunologic: Negative for environmental allergies and immunocompromised state.   Neurological:  Positive for weakness. Negative for dizziness, seizures, syncope, speech difficulty, light-headedness, numbness and headaches.   Hematological:  Negative for adenopathy.   Psychiatric/Behavioral:  Negative for sleep disturbance and suicidal ideas. The patient is not nervous/anxious.    All other systems reviewed and are negative.  Objective:     Vital Signs (Most Recent):  Temp: 99.5 °F (37.5 °C) (05/04/22 2040)  Pulse: 92 (05/04/22 2322)  Resp: (!) 28 (05/04/22 2243)  BP: (!) 181/68 (05/04/22 2301)  SpO2: 96 % (05/04/22 2322)   Vital Signs (24h Range):  Temp:  [99.5 °F (37.5 °C)-99.7 °F (37.6 °C)] 99.5 °F (37.5 °C)  Pulse:  [69-92] 92  Resp:  [18-28] 28  SpO2:  [88 %-100 %] 96 %  BP: (125-181)/(56-75) 181/68     Weight: 81.2 kg (179 lb)  Body mass index is 28.04 kg/m².    Physical Exam  Vitals and nursing note reviewed.   Constitutional:       Appearance: She is ill-appearing.   HENT:      Head: Normocephalic and atraumatic.      Nose: Nose normal.      Mouth/Throat:      Mouth: Mucous membranes are dry.      Pharynx: Oropharynx is clear.   Eyes:      Extraocular Movements: Extraocular movements intact.      Pupils: Pupils are equal, round, and reactive to light.   Cardiovascular:      Rate and Rhythm: Normal rate and regular rhythm.      Pulses: Normal pulses.   Pulmonary:      Effort: Pulmonary effort is normal.      Breath sounds: Normal breath sounds.      Comments: Frequent coughing diminished in bases  Abdominal:      General: Bowel sounds are normal.      Palpations: Abdomen is soft.      Tenderness: There is no abdominal tenderness.   Musculoskeletal:         General: Normal range of motion.      Cervical back: Normal range of motion and neck supple.   Skin:     General: Skin is warm  and dry.      Capillary Refill: Capillary refill takes less than 2 seconds.      Findings: Lesion present.      Comments: Left foot ulceration plantar surface tracking into the side and top of foot, see picture for further detail   Neurological:      General: No focal deficit present.      Mental Status: She is alert and oriented to person, place, and time.   Psychiatric:         Mood and Affect: Mood normal.         Behavior: Behavior normal.         CRANIAL NERVES     CN III, IV, VI   Pupils are equal, round, and reactive to light.     Significant Labs: All pertinent labs within the past 24 hours have been reviewed.  CBC:   Recent Labs   Lab 05/04/22 2040   WBC 21.06*   HGB 9.2*   HCT 28.5*        CMP:   Recent Labs   Lab 05/04/22 2040   *   K 3.2*   CL 84*   CO2 24   *   BUN 51*   CREATININE 9.9*   CALCIUM 8.8   PROT 8.0   ALBUMIN 3.5   BILITOT 0.9   ALKPHOS 60   AST 13   ALT 11   ANIONGAP 20*   EGFRNONAA 3.9*     Cardiac Markers:   Recent Labs   Lab 05/04/22 2040   *     Troponin:   Recent Labs   Lab 05/04/22 2040   TROPONINI 0.403*       Significant Imaging: I have reviewed all pertinent imaging results/findings within the past 24 hours.  EKG: I have reviewed all pertinent results/findings within the past 24 hours and my personal findings are:  Normal sinus rhythm, LVH with repolarization abnormality, very mild ST depression inferior leads, T-wave inversion in lateral leads  CT Abdomen Pelvis  Without Contrast    Result Date: 5/4/2022  EXAM: CT ABDOMEN PELVIS WITHOUT CONTRAST RadLex: CT ABDOMEN PELVIS WITHOUT IV CONTRAST HISTORY: 56 years  Female;  Abdominal pain, acute, nonlocalized; TECHNIQUE:   Standard CT of the abdomen and pelvis was performed without the use of intravenous contrast media. Lack of intravenous contrast limits evaluation of soft tissue structures in this study. CT scans at this facility use dose modulation, iterative reconstruction and/or weight based dosing  when appropriate to reduce radiation dose to as low as reasonably achievable. COMPARISON: None available FINDING(S): ABDOMEN: Lung bases show dependent atelectasis. Coronary artery calcifications are seen. Cardiac size normal. Mild thickening of the distal esophagus with small hiatal hernia. Small amount of fluid is seen in the stomach. Mild thickening of the stomach lining. Mild hepatic steatosis. Spleen, pancreas, and both adrenals are normal. There is gallbladder distention with multiple gallstones. Neither kidney shows hydronephrosis. There is moderate bilateral perinephric stranding which appears above expected and may represent inflammation. Correlate with urinalysis to exclude the possibility of pyelonephritis. There are vascular calcifications involving both kidneys. There may be separate small stones as well. The ureters themselves are decompressed. Marked atherosclerosis affects the aorta and the major branch vessels. No abdominal lymphadenopathy or free abdominal fluid. The small intestines contain a small to moderate amount of fluid with scattered air-fluid levels. No transition point to suggest obstruction. No evidence of appendicitis. Oral contrast is seen in the intestines. There is a mild to moderate stool burden. Scattered colonic diverticulosis. PELVIS: Bladder is decompressed. No free fluid within the pelvis. There is an enlarged left external iliac lymph node measuring 1.6 x 0.8 cm on series 3, image 210. No other groin or pelvis lymphadenopathy. A small left inguinal hernia contains only fat. Small umbilical hernia contains only fat. There are degenerative changes in the lower lumbar spine. IMPRESSION: 1.  Small to moderate amount of fluid in the small intestines with scattered air fluid levels, possibly enteritis 2.  Moderate bilateral perinephric stranding. Correlate with urinalysis to exclude the possibility of pyelonephritis 3.  Gallbladder distention with gallstones Electronically signed  by:  Ernesto Lino MD  5/4/2022 9:53 PM CDT Workstation: [id number]

## 2022-05-05 NOTE — ED PROVIDER NOTES
Encounter Date: 5/4/2022       History     Chief Complaint   Patient presents with    Fever     This is a 56-year-old female with end-stage renal disease, diabetes, hypertension and hyperlipidemia on dialysis Mondays Wednesdays and Fridays who presents for evaluation of fever.  The patient developed malaise fatigue nausea and body aches yesterday.  Her  checked her temperature today with a noted fever of 103 orally.  The patient has had some intermittent crampy abdominal pain with nausea.  She has had 1 or 2 episodes of vomiting.  She states that the patient was vomiting at dialysis 2 days ago.  She has also had increasing cough although she does have a chronic cough.  She denies chest pain or shortness of breath.  She denies any headache neck pain or stiffness.  Her appetite has been decreased.  She denies any other problems or complaints.        Review of patient's allergies indicates:   Allergen Reactions    Vancomycin Itching    Chlorhexidine Itching     Past Medical History:   Diagnosis Date    A-fib     resolved per patient    Arthritis     Bronchitis     Cardiovascular event risk, ASCVD 10-year risk 6.7% 10/15/2016    Diabetes mellitus     Diabetes mellitus type II     Diabetic ulcer of left midfoot 5/5/2022    Disorder of kidney and ureter     Encounter for blood transfusion     ESRD (end stage renal disease) 5/18/2018    MANJINDER (generalized anxiety disorder) 10/25/2016    History of colon polyps 11/02/2016    Hyperlipidemia     Hypertension     Stroke      Past Surgical History:   Procedure Laterality Date    COLONOSCOPY N/A 10/5/2016    Procedure: COLONOSCOPY;  Surgeon: Pillo Chanel MD;  Location: [facility];  Service: Endoscopy;  Laterality: N/A;    COLONOSCOPY N/A 4/26/2022    Procedure: COLONOSCOPY;  Surgeon: Saravanan Rachel MD;  Location: [facility];  Service: Endoscopy;  Laterality: N/A;    HERNIA REPAIR Bilateral 11/22/2016    inguinal    HYSTERECTOMY      OOPHORECTOMY        Family History   Problem Relation Age of Onset    Hyperlipidemia Mother     Hypertension Mother     Hypertension Father     Diabetes Father     Diabetes Sister     Diabetes Sister     Diabetes Maternal Aunt     Diabetes Maternal Grandmother     Heart disease Maternal Grandmother     Cancer Paternal Grandmother     Breast cancer Neg Hx     Colon cancer Neg Hx     Ovarian cancer Neg Hx      Social History     Tobacco Use    Smoking status: Never Smoker    Smokeless tobacco: Never Used   Substance Use Topics    Alcohol use: No    Drug use: No     Review of Systems   Constitutional: Positive for activity change, appetite change, chills, fatigue and fever.   HENT: Positive for congestion.    Respiratory: Positive for cough. Negative for shortness of breath, wheezing and stridor.    Cardiovascular: Negative.  Negative for chest pain, palpitations and leg swelling.   Gastrointestinal: Positive for abdominal pain, nausea and vomiting. Negative for diarrhea.   Genitourinary: Negative.    Musculoskeletal: Positive for arthralgias and myalgias. Negative for neck pain and neck stiffness.   Skin: Negative.    Neurological: Negative.    Psychiatric/Behavioral: Negative.    All other systems reviewed and are negative.      Physical Exam     Initial Vitals [05/04/22 1957]   BP Pulse Resp Temp SpO2   (!) 125/56 74 18 99.7 °F (37.6 °C) 95 %      MAP       --         Physical Exam    Constitutional: She is not diaphoretic. She does not appear ill. No distress.   HENT:   Head: Normocephalic and atraumatic.   Nose: Nose normal.   Mouth/Throat: Uvula is midline and oropharynx is clear and moist.   Eyes: Conjunctivae, EOM and lids are normal. Pupils are equal, round, and reactive to light. No scleral icterus.   Neck: Trachea normal and phonation normal. Neck supple. No stridor present.   Normal range of motion.  Cardiovascular: Normal rate, regular rhythm, normal heart sounds, intact distal pulses and normal pulses.  Exam reveals no gallop and no distant heart sounds.    No murmur heard.  Pulmonary/Chest: Breath sounds normal. No stridor.   Abdominal: Abdomen is soft. She exhibits no distension and no mass. There is abdominal tenderness. There is no rebound and no guarding.   Musculoskeletal:      Right hand: Normal. No tenderness or bony tenderness. Normal range of motion. Normal capillary refill. Normal pulse.      Left hand: No tenderness or bony tenderness. Normal range of motion. Normal capillary refill. Normal pulse.      Cervical back: Normal, normal range of motion and neck supple. No tenderness or bony tenderness. No pain with movement.      Thoracic back: Normal. No bony tenderness. Normal range of motion.      Lumbar back: Normal. No bony tenderness. Normal range of motion.      Right foot: Normal.      Left foot: Normal range of motion and normal capillary refill. Swelling present. No tenderness. Normal pulse.      Comments: Pulses 2+ throughout,  Left plantar foot with open wound, edema erythema and increased warmth noted around the area and extending to the medial and dorsal aspect of the foot.  Chronically decreased sensation to the feet.  Patient denies pain on palpation.     Neurological: She is alert and oriented to person, place, and time. She has normal strength. No cranial nerve deficit or sensory deficit. Gait normal. GCS score is 15. GCS eye subscore is 4. GCS verbal subscore is 5. GCS motor subscore is 6.   Skin: Capillary refill takes less than 2 seconds. No rash noted. There is erythema.   Psychiatric: She has a normal mood and affect. Her speech is normal and behavior is normal.                 ED Course   Procedures  Labs Reviewed   APTT - Abnormal; Notable for the following components:       Result Value    aPTT 22.5 (*)     All other components within normal limits   B-TYPE NATRIURETIC PEPTIDE - Abnormal; Notable for the following components:     (*)     All other components within normal  limits   CBC W/ AUTO DIFFERENTIAL - Abnormal; Notable for the following components:    WBC 21.06 (*)     RBC 3.39 (*)     Hemoglobin 9.2 (*)     Hematocrit 28.5 (*)     Gran % 86.0 (*)     Lymph % 3.0 (*)     All other components within normal limits   COMPREHENSIVE METABOLIC PANEL - Abnormal; Notable for the following components:    Sodium 128 (*)     Potassium 3.2 (*)     Chloride 84 (*)     Glucose 446 (*)     BUN 51 (*)     Creatinine 9.9 (*)     Anion Gap 20 (*)     eGFR if  4.6 (*)     eGFR if non  3.9 (*)     All other components within normal limits   PROTIME-INR - Abnormal; Notable for the following components:    PT 15.3 (*)     All other components within normal limits   TROPONIN I - Abnormal; Notable for the following components:    Troponin I 0.403 (*)     All other components within normal limits   C-REACTIVE PROTEIN - Abnormal; Notable for the following components:    CRP 32.78 (*)     All other components within normal limits   POCT GLUCOSE - Abnormal; Notable for the following components:    POC Glucose 386 (*)     All other components within normal limits   LIPASE   MAGNESIUM   TSH   LACTIC ACID, PLASMA   SARS-COV-2 RNA AMPLIFICATION, QUAL   INFLUENZA A AND B ANTIGEN    Narrative:     Specimen Source->Nasopharyngeal Swab   C-REACTIVE PROTEIN   POCT GLUCOSE MONITORING CONTINUOUS        ECG Results          EKG 12-lead (In process)  Result time 05/05/22 05:01:39    In process by Interface, Lab In [facility] (05/05/22 05:01:39)                 Narrative:    Test Reason : R50.9,    Vent. Rate : 074 BPM     Atrial Rate : 074 BPM     P-R Int : 146 ms          QRS Dur : 090 ms      QT Int : 426 ms       P-R-T Axes : 045 -31 130 degrees     QTc Int : 472 ms    Normal sinus rhythm  Left axis deviation  LVH with repolarization abnormality  Abnormal ECG  When compared with ECG of 05-APR-2022 08:19,  Previous ECG has undetermined rhythm, needs review  Minimal criteria for Septal  infarct are no longer Present  ST now depressed in Inferior leads  T wave inversion more evident in Lateral leads    Referred By: [email or web address]   SELF           Confirmed By:                             Imaging Results          X-Ray Foot Complete Left (Final result)  Result time 05/05/22 06:12:52    Final result by Denise Estrada MD (05/05/22 06:12:52)                 Narrative:    Three views of the left foot are compared to prior study dated 12/3/2021    Clinical history is infection    There is osteopenia. There has been prior amputation of the fourth digit at the base of the proximal phalanx.    There are moderate degenerative changes of the midfoot and hindfoot with joint space narrowing and spurring.. There are no fractures, dislocations or osteolysis. There is a subcutaneous ulceration the mid plantar soft tissues. There is moderate peripheral vascular calcification.    IMPRESSION: Prior amputation of fourth digit without radiographic evidence for osteomyelitis.    Moderate degenerative changes of the foot    16mm soft tissue ulceration in the mid plantar medial soft tissues    Electronically signed by:  Denise Estrada MD  5/5/2022 6:12 AM CDT Workstation: [id number]                             CT Abdomen Pelvis  Without Contrast (Final result)  Result time 05/04/22 21:53:02    Final result by Ernesto Lino MD (05/04/22 21:53:02)                 Narrative:    EXAM: CT ABDOMEN PELVIS WITHOUT CONTRAST    RadLex: CT ABDOMEN PELVIS WITHOUT IV CONTRAST    HISTORY: 56 years  Female;  Abdominal pain, acute, nonlocalized;    TECHNIQUE:   Standard CT of the abdomen and pelvis was performed without the use of intravenous contrast media. Lack of intravenous contrast limits evaluation of soft tissue structures in this study.    CT scans at this facility use dose modulation, iterative reconstruction and/or weight based dosing when appropriate to reduce radiation dose to as low as reasonably  achievable.    COMPARISON: None available    FINDING(S):    ABDOMEN:    Lung bases show dependent atelectasis. Coronary artery calcifications are seen. Cardiac size normal. Mild thickening of the distal esophagus with small hiatal hernia.    Small amount of fluid is seen in the stomach. Mild thickening of the stomach lining.    Mild hepatic steatosis. Spleen, pancreas, and both adrenals are normal.    There is gallbladder distention with multiple gallstones.    Neither kidney shows hydronephrosis. There is moderate bilateral perinephric stranding which appears above expected and may represent inflammation. Correlate with urinalysis to exclude the possibility of pyelonephritis.    There are vascular calcifications involving both kidneys. There may be separate small stones as well. The ureters themselves are decompressed.    Marked atherosclerosis affects the aorta and the major branch vessels. No abdominal lymphadenopathy or free abdominal fluid.    The small intestines contain a small to moderate amount of fluid with scattered air-fluid levels. No transition point to suggest obstruction.    No evidence of appendicitis. Oral contrast is seen in the intestines. There is a mild to moderate stool burden. Scattered colonic diverticulosis.    PELVIS:    Bladder is decompressed. No free fluid within the pelvis. There is an enlarged left external iliac lymph node measuring 1.6 x 0.8 cm on series 3, image 210.    No other groin or pelvis lymphadenopathy. A small left inguinal hernia contains only fat.    Small umbilical hernia contains only fat.    There are degenerative changes in the lower lumbar spine.    IMPRESSION:    1.  Small to moderate amount of fluid in the small intestines with scattered air fluid levels, possibly enteritis  2.  Moderate bilateral perinephric stranding. Correlate with urinalysis to exclude the possibility of pyelonephritis  3.  Gallbladder distention with gallstones    Electronically signed by:   Ernesto Lino MD  5/4/2022 9:53 PM CDT Workstation: [id number]                             X-Ray Chest 1 View (Final result)  Result time 05/05/22 06:10:56    Final result by Denise Estrada MD (05/05/22 06:10:56)                 Narrative:    XR CHEST 1 VIEW    CLINICAL HISTORY:  56 years Female pain    COMPARISON: 5/19/2021    FINDINGS: Cardiomediastinal silhouette is within normal limits. Lungs are normally expanded with no airspace consolidation. No pleural effusion or pneumothorax. No acute osseous abnormality.    IMPRESSION: No acute pulmonary process.    Electronically signed by:  Denise Estrada MD  5/5/2022 6:10 AM CDT Workstation: [id number]                               Medications   ascorbic acid (vitamin C) tablet 500 mg (500 mg Oral Given 5/5/22 0814)   aspirin EC tablet 81 mg (81 mg Oral Given 5/5/22 0814)   atorvastatin tablet 80 mg (80 mg Oral Given 5/5/22 2020)   gabapentin capsule 300 mg (300 mg Oral Given 5/5/22 2020)   insulin detemir U-100 pen 20 Units (20 Units Subcutaneous Given 5/5/22 2057)   metoprolol tartrate (LOPRESSOR) tablet 25 mg (25 mg Oral Given 5/5/22 2020)   minoxidiL tablet 5 mg (5 mg Oral Given 5/5/22 2020)   multivitamin tablet (1 tablet Oral Given 5/5/22 0814)   calcium acetate(phosphat bind) capsule 1,334 mg (1,334 mg Oral Given 5/5/22 1734)   sodium chloride 0.9% flush 10 mL (has no administration in time range)   naloxone 0.4 mg/mL injection 0.02 mg (has no administration in time range)   acetaminophen tablet 650 mg (650 mg Oral Given 5/5/22 0046)   HYDROcodone-acetaminophen 5-325 mg per tablet 1 tablet (1 tablet Oral Given 5/5/22 1734)   ondansetron injection 4 mg (has no administration in time range)   polyethylene glycol packet 17 g (has no administration in time range)   melatonin tablet 6 mg (6 mg Oral Given 5/5/22 2024)   piperacillin-tazobactam 3.375 g in dextrose 5 % 50 mL IVPB (ready to mix system) (0 g Intravenous Stopped 5/5/22 4031)   insulin aspart U-100  pen 1-6 Units (4 Units Subcutaneous Given 5/5/22 2058)   dextrose 50% injection 25 g (has no administration in time range)   dextrose 50% injection 12.5 g (has no administration in time range)   benzonatate capsule 100 mg (100 mg Oral Given 5/5/22 1734)   fluticasone propionate 50 mcg/actuation nasal spray 100 mcg (100 mcg Each Nostril Given 5/5/22 0814)   cetirizine tablet 10 mg (10 mg Oral Given 5/5/22 0247)   mupirocin 2 % ointment ( Nasal Given 5/5/22 2020)   epoetin chris-epbx injection 4,500 Units (has no administration in time range)   collagenase ointment (0 g Topical (Top) Hold 5/5/22 1415)   albuterol-ipratropium 2.5 mg-0.5 mg/3 mL nebulizer solution 3 mL (3 mLs Nebulization Given 5/5/22 1931)   levoFLOXacin 500 mg/100 mL IVPB 500 mg (0 mg Intravenous Stopped 5/5/22 1833)   azelastine 137 mcg (0.1 %) nasal spray 137 mcg (137 mcg Nasal Given 5/5/22 2054)   meropenem-0.9% sodium chloride 1 g/50 mL IVPB (0 g Intravenous Stopped 5/5/22 0121)   ondansetron injection 4 mg (4 mg Intravenous Given 5/5/22 0004)   levalbuterol nebulizer solution 0.63 mg (0.63 mg Nebulization Given 5/4/22 2243)   Tdap vaccine injection 0.5 mL (0.5 mLs Intramuscular Given 5/5/22 0006)   neomycin-bacitracin-polymyxin ointment ( Topical (Top) Given 5/4/22 2359)   potassium chloride SA CR tablet 20 mEq (20 mEq Oral Given 5/5/22 0003)   sodium chloride 0.9% bolus 250 mL (0 mLs Intravenous Stopped 5/5/22 0351)   insulin regular injection 5 Units (5 Units Intravenous Given 5/5/22 0256)     Medical Decision Making:   Clinical Tests:   Lab Tests: Reviewed  Radiological Study: Reviewed  Medical Tests: Reviewed  ED Management:  Left diabetic foot infection noted.  Patient did not complain about any extremity problems on exam.  She states she was not aware that area had becomes so bad appearing.  She states she knew that she had a wound to the area but had not informed her family member.  She does not bathe daily so she states she has not seen  wound in several days.  She is not in any trauma.  Blood cultures obtained, IV antibiotics ordered, wound care ordered, will discuss with hospitalist for admission.  Abdominal exam is currently benign with no tenderness to palpation.  CT findings noted including gallstones.  Clinically correlating the patient does not have any right upper quadrant tenderness on exam and has a negative Angulo's sign.  There is no abdominal guarding or rebound and no current tenderness to palpation.                       Clinical Impression:   Final diagnoses:  [R50.9] Fever  [L08.9] Foot infection  [E11.628, L08.9] Diabetic foot infection (Primary)  [R05.9] Cough  [J20.9] Acute bronchitis, unspecified organism  [A41.9] Sepsis          ED Disposition Condition    Admit               Misti Shay MD  05/05/22 5053

## 2022-05-05 NOTE — ASSESSMENT & PLAN NOTE
Consult Podiatry  Cover with Zosyn/doxycycline- patient has vancomycin allergy  Check  CRP/procalcitonin  X-rays done in ER & pending

## 2022-05-05 NOTE — ASSESSMENT & PLAN NOTE
accuchecks ACHS with low dose ISS  Increased home basal insulin from 15-18 units to 20 units  Check A1c  Anion gap is elevated however bicarbonate is normal will check beta hydroxy butyrate  Not Acidotic

## 2022-05-05 NOTE — ED NOTES
"PT C/O CHRONIC COUGH FOR "OVER A YEAR" THAT HAS WORSENED OVER THE PAST 2 DAYS WITH N/V AND ABDOMINAL DISCOMFORT. NO ACUTE DISTRESS NOTED. STABLE CONDITION. FAMILY MEMBER AT BEDSIDE.   "

## 2022-05-05 NOTE — PLAN OF CARE
05/05/22 0222   Patient Assessment/Suction   Level of Consciousness (AVPU) alert   Respiratory Effort Unlabored   Expansion/Accessory Muscles/Retractions expansion symmetric   All Lung Fields Breath Sounds clear   Rhythm/Pattern, Respiratory depth regular;pattern regular   Cough Frequency infrequent   Cough Type good;nonproductive   PRE-TX-O2   O2 Device (Oxygen Therapy) room air   SpO2 97 %   Pulse Oximetry Type Intermittent   $ Pulse Oximetry - Single Charge Pulse Oximetry - Single   Pulse 79   Resp 18

## 2022-05-05 NOTE — PLAN OF CARE
05/05/22 0810   Patient Assessment/Suction   Level of Consciousness (AVPU) alert   Respiratory Effort Normal;Unlabored   Expansion/Accessory Muscles/Retractions no use of accessory muscles   All Lung Fields Breath Sounds diminished   Rhythm/Pattern, Respiratory depth regular   Cough Frequency infrequent   Cough Type good;nonproductive   PRE-TX-O2   O2 Device (Oxygen Therapy) nasal cannula   $ Is the patient on Low Flow Oxygen? Yes   Flow (L/min) 2   SpO2 98 %   Pulse Oximetry Type Intermittent   $ Pulse Oximetry - Multiple Charge Pulse Oximetry - Multiple   Aerosol Therapy   $ Aerosol Therapy Charges PRN treatment not required   Education   $ Education Bronchodilator;15 min   Respiratory Evaluation   $ Care Plan Tech Time 15 min   $ Eval/Re-eval Charges Evaluation   Evaluation For New Orders

## 2022-05-05 NOTE — CARE UPDATE
Change tx to q6   05/05/22 1396   Patient Assessment/Suction   Level of Consciousness (AVPU) alert   Respiratory Effort Unlabored;Normal   Expansion/Accessory Muscles/Retractions no use of accessory muscles;expansion symmetric   All Lung Fields Breath Sounds coarse;diminished   LATISHA Breath Sounds coarse   LLL Breath Sounds crackles   RUL Breath Sounds coarse   RML Breath Sounds diminished   RLL Breath Sounds diminished   Rhythm/Pattern, Respiratory unlabored;depth regular   Cough Frequency frequent   Cough Type good;nonproductive   PRE-TX-O2   SpO2 95 %   Pulse 80   Aerosol Therapy   $ Aerosol Therapy Charges Aerosol Treatment   Daily Review of Necessity (SVN) completed   Respiratory Treatment Status (SVN) given   Treatment Route (SVN) oxygen;mask   Patient Position (SVN) HOB elevated   Post Treatment Assessment (SVN) increased aeration   Signs of Intolerance (SVN) none   Breath Sounds Post-Respiratory Treatment   Throughout All Fields Post-Treatment All Fields   Throughout All Fields Post-Treatment aeration increased   Post-treatment Heart Rate (beats/min) 90   Post-treatment Resp Rate (breaths/min) 22

## 2022-05-05 NOTE — H&P
[facility] Medicine  History & Physical    DOS: 05/04/2022  11:08 PM    Patient Name: Leanna García  MRN: [id number]  Patient Class: IP- Inpatient  Admission Date: 5/4/2022  Attending Physician: Dr. Euceda  Primary Care Provider: Stefano Lopez MD         Patient information was obtained from patient, past medical records and ER records.     Subjective:     Principal Problem:Sepsis    Chief Complaint:   Chief Complaint   Patient presents with    Fever        HPI: Ms. García is a 56-year-old female with a past medical history of ESRD on HD Monday Wednesday Friday, hypertension, IDDM2, PAF, and chronic cough who presents today with complaints of fever up to 103. It is severe.  It is associated with cough, posttussive vomiting, fatigue, sinus congestion, weakness, and a left foot ulcer.  She denies chest pain, diarrhea, dizziness, loss of consciousness.  In the ED she was noted to have a large left foot ulceration at the plantar surface and on the side of the foot.  CT of abdomen pelvis also reveals possible enteritis and bilateral perinephric fat stranding.  WBCs 21 K, troponin is 0.4 in the setting of ESRD.  She did not go to dialysis today she felt too bad.  Glucose is 446, anion gap 20, bicarb 24.       Past Medical History:   Diagnosis Date    A-fib     resolved per patient    Arthritis     Bronchitis     Cardiovascular event risk, ASCVD 10-year risk 6.7% 10/15/2016    Diabetes mellitus     Diabetes mellitus type II     Disorder of kidney and ureter     Encounter for blood transfusion     History of colon polyps 11/02/2016    Hyperlipidemia     Hypertension     Stroke        Past Surgical History:   Procedure Laterality Date    COLONOSCOPY N/A 10/5/2016    Procedure: COLONOSCOPY;  Surgeon: Pillo Chanel MD;  Location: [facility];  Service: Endoscopy;  Laterality: N/A;    COLONOSCOPY N/A 4/26/2022    Procedure: COLONOSCOPY;  Surgeon: Saravanan Rachel MD;  Location:  "[facility] ENDO;  Service: Endoscopy;  Laterality: N/A;    HERNIA REPAIR Bilateral 11/22/2016    inguinal    HYSTERECTOMY      OOPHORECTOMY         Review of patient's allergies indicates:   Allergen Reactions    Vancomycin Itching    Chlorhexidine Itching       No current facility-administered medications on file prior to encounter.     Current Outpatient Medications on File Prior to Encounter   Medication Sig    ascorbic acid, vitamin C, (VITAMIN C) 500 MG tablet Take 500 mg by mouth once daily.    aspirin (ECOTRIN) 81 MG EC tablet Take 81 mg by mouth once daily.    atorvastatin (LIPITOR) 80 MG tablet Take 1 tablet (80 mg total) by mouth once daily. (Patient taking differently: Take 80 mg by mouth every evening.)    blood sugar diagnostic Strp To check BG 4 times daily, to use with insurance preferred meter (Patient taking differently: 1 strip by Misc.(Non-Drug; Combo Route) route 4 (four) times daily. To check BG 4 times daily, to use with insurance preferred meter)    insulin aspart U-100 (NOVOLOG FLEXPEN U-100 INSULIN) 100 unit/mL (3 mL) InPn pen Inject 5-8 units 3 times per day with food.    insulin detemir U-100 (LEVEMIR FLEXTOUCH U-100 INSULN) 100 unit/mL (3 mL) InPn pen Inject 15-18 Units into the skin once daily.    [place]ancets [facility] [place] use to check blood sugar 4 times daily. To use with insurance approved meter. Patient needs the round, purple one (Patient taking differently: 1 lancet by Misc.(Non-Drug; Combo Route) route 4 (four) times daily. To use to check blood sugar 4 times daily. To use with insurance approved meter. Patient needs the round, purple one)    metoprolol tartrate (LOPRESSOR) 25 MG tablet Take 25 mg by mouth 2 (two) times daily.    minoxidiL (LONITEN) 2.5 MG tablet Take 5 mg by mouth 2 (two) times daily.    multivitamin (THERAGRAN) per tablet Take 1 tablet by mouth once daily.    pen needle, diabetic (BD ULTRA-FINE ROBERT PEN NEEDLE) 32 gauge x 5/32" Ndle To use 4 times per day with " insulin injections. (Patient taking differently: 1 pen by Misc.(Non-Drug; Combo Route) route 4 (four) times daily. To use 4 times per day with insulin injections.)    sucroferric oxyhydroxide (VELPHORO) 500 mg Chew Take 500 mg by mouth 3 (three) times daily.    dextrose (GLUCOSE GEL) 40 % gel Take 37.5 mLs (15,000 mg total) by mouth once as needed (hypoglycemia).    gabapentin (NEURONTIN) 300 MG capsule Take 300 mg by mouth every evening.    glucagon (BAQSIMI) 3 mg/actuation Spry 3 mg (one actuation) into a single nostril; if no response, may repeat in 15 minutes using a new intranasal device. (Patient taking differently: 3 mg by Left Nostril route as needed. 3 mg (one actuation) into a single nostril; if no response, may repeat in 15 minutes using a new intranasal device.)    [DISCONTINUED] amLODIPine (NORVASC) 10 MG tablet Take 10 mg by mouth once daily.    [DISCONTINUED] blood-glucose meter (ACCU-CHEK KAYLIE PLUS METER) Misc To use to check blood sugars 4 times a day    [DISCONTINUED] clorazepate (TRANXENE) 3.75 MG Tab Take 7.5 mg by mouth nightly as needed.     [DISCONTINUED] doxercalciferoL (HECTOROL) 4 mcg/2 mL injection Inject 8 mcg into the vein 3 (three) times a week.    [DISCONTINUED] epoetin beta, methoxy peg (MIRCERA) 30 mcg/0.3 mL Syrg Inject 30 mcg as directed every 28 days.    [DISCONTINUED] ergocalciferol (ERGOCALCIFEROL) 50,000 unit Cap Take 50,000 Units by mouth every 30 days.    [DISCONTINUED] EScitalopram oxalate (LEXAPRO) 10 MG tablet Take 10 mg by mouth once daily.    [DISCONTINUED] ezetimibe (ZETIA) 10 mg tablet Take 10 mg by mouth once daily.    [DISCONTINUED] fenofibrate 160 MG Tab Take 1 tablet (160 mg total) by mouth once daily.    [DISCONTINUED] lancing device with lancets (ACCU-CHEK SOFT DEV LANCETS) Kit To check blood sugars 4 times per day    [DISCONTINUED] losartan (COZAAR) 25 MG tablet Take 25 mg by mouth once daily.     [DISCONTINUED] pantoprazole (PROTONIX) 40 MG  tablet Take 40 mg by mouth once daily.     Family History       Problem Relation (Age of Onset)    Cancer Paternal Grandmother    Diabetes Father, Sister, Sister, Maternal Aunt, Maternal Grandmother    Heart disease Maternal Grandmother    Hyperlipidemia Mother    Hypertension Mother, Father          Tobacco Use    Smoking status: Never Smoker    Smokeless tobacco: Never Used   Substance and Sexual Activity    Alcohol use: No    Drug use: No    Sexual activity: Yes     Partners: Male     Review of Systems   Constitutional:  Positive for chills, diaphoresis, fatigue and fever. Negative for activity change, appetite change and unexpected weight change.   HENT:  Negative for congestion, ear pain, facial swelling, hearing loss, sore throat and trouble swallowing.    Eyes:  Negative for pain and discharge.   Respiratory:  Positive for cough and shortness of breath. Negative for chest tightness and wheezing.    Cardiovascular:  Negative for chest pain, palpitations and leg swelling.   Gastrointestinal:  Positive for nausea and vomiting. Negative for abdominal pain, blood in stool and diarrhea.   Endocrine: Negative for polydipsia, polyphagia and polyuria.   Genitourinary:  Negative for difficulty urinating, dysuria, flank pain, frequency and urgency.   Musculoskeletal:  Negative for arthralgias, back pain, joint swelling, neck pain and neck stiffness.   Skin:  Positive for wound. Negative for rash.   Allergic/Immunologic: Negative for environmental allergies and immunocompromised state.   Neurological:  Positive for weakness. Negative for dizziness, seizures, syncope, speech difficulty, light-headedness, numbness and headaches.   Hematological:  Negative for adenopathy.   Psychiatric/Behavioral:  Negative for sleep disturbance and suicidal ideas. The patient is not nervous/anxious.    All other systems reviewed and are negative.  Objective:     Vital Signs (Most Recent):  Temp: 99.5 °F (37.5 °C) (05/04/22  2040)  Pulse: 92 (05/04/22 2322)  Resp: (!) 28 (05/04/22 2243)  BP: (!) 181/68 (05/04/22 2301)  SpO2: 96 % (05/04/22 2322)   Vital Signs (24h Range):  Temp:  [99.5 °F (37.5 °C)-99.7 °F (37.6 °C)] 99.5 °F (37.5 °C)  Pulse:  [69-92] 92  Resp:  [18-28] 28  SpO2:  [88 %-100 %] 96 %  BP: (125-181)/(56-75) 181/68     Weight: 81.2 kg (179 lb)  Body mass index is 28.04 kg/m².    Physical Exam  Vitals and nursing note reviewed.   Constitutional:       Appearance: She is ill-appearing.   HENT:      Head: Normocephalic and atraumatic.      Nose: Nose normal.      Mouth/Throat:      Mouth: Mucous membranes are dry.      Pharynx: Oropharynx is clear.   Eyes:      Extraocular Movements: Extraocular movements intact.      Pupils: Pupils are equal, round, and reactive to light.   Cardiovascular:      Rate and Rhythm: Normal rate and regular rhythm.      Pulses: Normal pulses.   Pulmonary:      Effort: Pulmonary effort is normal.      Breath sounds: Normal breath sounds.      Comments: Frequent coughing diminished in bases  Abdominal:      General: Bowel sounds are normal.      Palpations: Abdomen is soft.      Tenderness: There is no abdominal tenderness.   Musculoskeletal:         General: Normal range of motion.      Cervical back: Normal range of motion and neck supple.   Skin:     General: Skin is warm and dry.      Capillary Refill: Capillary refill takes less than 2 seconds.      Findings: Lesion present.      Comments: Left foot ulceration plantar surface tracking into the side and top of foot, see picture for further detail   Neurological:      General: No focal deficit present.      Mental Status: She is alert and oriented to person, place, and time.   Psychiatric:         Mood and Affect: Mood normal.         Behavior: Behavior normal.         CRANIAL NERVES     CN III, IV, VI   Pupils are equal, round, and reactive to light.     Significant Labs: All pertinent labs within the past 24 hours have been reviewed.  CBC:    Recent Labs   Lab 05/04/22 2040   WBC 21.06*   HGB 9.2*   HCT 28.5*        CMP:   Recent Labs   Lab 05/04/22 2040   *   K 3.2*   CL 84*   CO2 24   *   BUN 51*   CREATININE 9.9*   CALCIUM 8.8   PROT 8.0   ALBUMIN 3.5   BILITOT 0.9   ALKPHOS 60   AST 13   ALT 11   ANIONGAP 20*   EGFRNONAA 3.9*     Cardiac Markers:   Recent Labs   Lab 05/04/22 2040   *     Troponin:   Recent Labs   Lab 05/04/22 2040   TROPONINI 0.403*       Significant Imaging: I have reviewed all pertinent imaging results/findings within the past 24 hours.  EKG: I have reviewed all pertinent results/findings within the past 24 hours and my personal findings are:  Normal sinus rhythm, LVH with repolarization abnormality, very mild ST depression inferior leads, T-wave inversion in lateral leads  CT Abdomen Pelvis  Without Contrast    Result Date: 5/4/2022  EXAM: CT ABDOMEN PELVIS WITHOUT CONTRAST RadLex: CT ABDOMEN PELVIS WITHOUT IV CONTRAST HISTORY: 56 years  Female;  Abdominal pain, acute, nonlocalized; TECHNIQUE:   Standard CT of the abdomen and pelvis was performed without the use of intravenous contrast media. Lack of intravenous contrast limits evaluation of soft tissue structures in this study. CT scans at this facility use dose modulation, iterative reconstruction and/or weight based dosing when appropriate to reduce radiation dose to as low as reasonably achievable. COMPARISON: None available FINDING(S): ABDOMEN: Lung bases show dependent atelectasis. Coronary artery calcifications are seen. Cardiac size normal. Mild thickening of the distal esophagus with small hiatal hernia. Small amount of fluid is seen in the stomach. Mild thickening of the stomach lining. Mild hepatic steatosis. Spleen, pancreas, and both adrenals are normal. There is gallbladder distention with multiple gallstones. Neither kidney shows hydronephrosis. There is moderate bilateral perinephric stranding which appears above expected and may  represent inflammation. Correlate with urinalysis to exclude the possibility of pyelonephritis. There are vascular calcifications involving both kidneys. There may be separate small stones as well. The ureters themselves are decompressed. Marked atherosclerosis affects the aorta and the major branch vessels. No abdominal lymphadenopathy or free abdominal fluid. The small intestines contain a small to moderate amount of fluid with scattered air-fluid levels. No transition point to suggest obstruction. No evidence of appendicitis. Oral contrast is seen in the intestines. There is a mild to moderate stool burden. Scattered colonic diverticulosis. PELVIS: Bladder is decompressed. No free fluid within the pelvis. There is an enlarged left external iliac lymph node measuring 1.6 x 0.8 cm on series 3, image 210. No other groin or pelvis lymphadenopathy. A small left inguinal hernia contains only fat. Small umbilical hernia contains only fat. There are degenerative changes in the lower lumbar spine. IMPRESSION: 1.  Small to moderate amount of fluid in the small intestines with scattered air fluid levels, possibly enteritis 2.  Moderate bilateral perinephric stranding. Correlate with urinalysis to exclude the possibility of pyelonephritis 3.  Gallbladder distention with gallstones Electronically signed by:  Ernesto Lino MD  5/4/2022 9:53 PM CDT Workstation: [id number]             Assessment/Plan:     * Sepsis  Admit to cardiology B   Not candidate for 30mL/kg bolus as she is ESRD on HD and not hypotensive, however does appear dry, will give 250cc bolus  Source: foot/enteritis       This patient does have evidence of infective focus  My overall impression is sepsis. Vital signs were reviewed and noted in progress note.  Antibiotics given-   Antibiotics (From admission, onward)            Start     Stop Route Frequency Ordered    05/05/22 0400  doxycycline (VIBRAMYCIN) 100mg in dextrose 5% 250 mL IVPB (ready to mix)          "-- IV Every 12 hours (non-standard times) 05/04/22 2345 05/05/22 0045  piperacillin-tazobactam 3.375 g in dextrose 5 % 50 mL IVPB (ready to mix system)        Note to Pharmacy: Ht: 5' 7" (1.702 m)  Wt: 81.2 kg (179 lb)  Estimated Creatinine Clearance: 7 mL/min (A) (based on SCr of 9.9 mg/dL (H)).  Body mass index is 28.04 kg/m².    -- IV Every 12 hours (non-standard times) 05/04/22 2345 05/04/22 2330  meropenem-0.9% sodium chloride 1 g/50 mL IVPB         -- IV Once 05/04/22 2216        Cultures were taken-   Microbiology Results (last 7 days)     Procedure Component Value Units Date/Time    Blood culture #2 **CANNOT BE ORDERED STAT** [[id number]] Collected: 05/04/22 2155    Order Status: Sent Specimen: Blood from Peripheral, Antecubital, Right Updated: 05/04/22 2210    Blood culture #1 **CANNOT BE ORDERED STAT** [[id number]] Collected: 05/04/22 2040    Order Status: Sent Specimen: Blood from Peripheral, Antecubital, Right Updated: 05/04/22 2056        Latest lactate reviewed, they are-  Recent Labs   Lab 05/04/22 2040   LACTATE 1.8         Source- foot/GI    Source control Achieved by- abx as above      Hypokalemia  Potassium 3.2 where 20 mEq p.o. repeat levels in the morning further management per Nephrology    Enteritis  250 cc bolus  Symptomatic management with antiemetics    Diabetic ulcer of left midfoot  Consult Podiatry  Cover with Zosyn/doxycycline- patient has vancomycin allergy  Check  CRP/procalcitonin  X-rays done in ER & pending    Type 2 diabetes mellitus with kidney complication, with long-term current use of insulin  accuchecks ACHS with low dose ISS  Increased home basal insulin from 15-18 units to 20 units  Check A1c  Anion gap is elevated however bicarbonate is normal will check beta hydroxy butyrate  Not Acidotic      ESRD (end stage renal disease)  Consult nephrology for HD management  Daily weight   accurate I&O      Elevated Troponin   Likely demand ischemia in the setting of ESRD   Trend "      VTE Risk Mitigation (From admission, onward)         Ordered     IP VTE HIGH RISK PATIENT  Once         05/05/22 0003     Place sequential compression device  Until discontinued         05/05/22 0003                   Yani Bates NP  Department of Hospital Medicine   [facility]

## 2022-05-05 NOTE — CONSULTS
[facility]  Podiatry  Consult Note    Patient Name: Leanna García  MRN: [id number]  Admission Date: 5/4/2022  Hospital Length of Stay: 1 days  Attending Physician: Usman Stacy MD  Primary Care Provider: Stefano Lopez MD     Inpatient consult to Podiatry  Consult performed by: Ricardo Bruno DPM  Consult ordered by: Yani Bates NP        Subjective:     History of Present Illness: 56-year-old female with a past medical history of ESRD on HD Monday Wednesday Friday, hypertension, IDDM2, PAF, bilateral Charcot deformity presented to ED with complaints of fever.  On presentation to the ED she was noted to have 2 ulcerations to the plantar and medial aspects of the left foot.  Patient is unsure exactly when these wounds started.  She states that she has been caring for them by soaking them in Epson salts.  She has not been seeing anyone outpatient for these wounds.    Scheduled Meds:   ascorbic acid (vitamin C)  500 mg Oral Daily    aspirin  81 mg Oral Daily    atorvastatin  80 mg Oral QHS    calcium acetate(phosphat bind)  1,334 mg Oral TID WM    cetirizine  10 mg Oral Every other day    doxycycline (VIBRAMYCIN) IVPB  100 mg Intravenous Q12H    [START ON 5/6/2022] epoetin chris-epbx  50 Units/kg Intravenous Every Mon, Wed, Fri    fluticasone propionate  2 spray Each Nostril Daily    gabapentin  300 mg Oral QHS    insulin detemir U-100  20 Units Subcutaneous QHS    metoprolol tartrate  25 mg Oral BID    minoxidiL  5 mg Oral BID    multivitamin  1 tablet Oral Daily    mupirocin   Nasal BID    piperacillin-tazobactam (ZOSYN) IVPB  3.375 g Intravenous Q12H     Continuous Infusions:  PRN Meds:acetaminophen, albuterol-ipratropium, benzonatate, dextrose 50%, dextrose 50%, HYDROcodone-acetaminophen, insulin aspart U-100, melatonin, naloxone, ondansetron, polyethylene glycol, sodium chloride 0.9%    Review of patient's allergies indicates:   Allergen Reactions    Vancomycin Itching     Chlorhexidine Itching        Past Medical History:   Diagnosis Date    A-fib     resolved per patient    Arthritis     Bronchitis     Cardiovascular event risk, ASCVD 10-year risk 6.7% 10/15/2016    Diabetes mellitus     Diabetes mellitus type II     Disorder of kidney and ureter     Encounter for blood transfusion     History of colon polyps 11/02/2016    Hyperlipidemia     Hypertension     Stroke      Past Surgical History:   Procedure Laterality Date    COLONOSCOPY N/A 10/5/2016    Procedure: COLONOSCOPY;  Surgeon: Pillo Chanel MD;  Location: [facility] ENDO;  Service: Endoscopy;  Laterality: N/A;    COLONOSCOPY N/A 4/26/2022    Procedure: COLONOSCOPY;  Surgeon: Saravanan Rachel MD;  Location: [facility] ENDO;  Service: Endoscopy;  Laterality: N/A;    HERNIA REPAIR Bilateral 11/22/2016    inguinal    HYSTERECTOMY      OOPHORECTOMY         Family History     Problem Relation (Age of Onset)    Cancer Paternal Grandmother    Diabetes Father, Sister, Sister, Maternal Aunt, Maternal Grandmother    Heart disease Maternal Grandmother    Hyperlipidemia Mother    Hypertension Mother, Father        Tobacco Use    Smoking status: Never Smoker    Smokeless tobacco: Never Used   Substance and Sexual Activity    Alcohol use: No    Drug use: No    Sexual activity: Yes     Partners: Male     Review of Systems   Constitutional: Negative for chills, fatigue, fever and unexpected weight change.   HENT: Negative for hearing loss and trouble swallowing.    Eyes: Negative for photophobia and visual disturbance.   Respiratory: Negative for cough, shortness of breath and wheezing.    Cardiovascular: Negative for chest pain, palpitations and leg swelling.   Gastrointestinal: Negative for abdominal pain and nausea.   Genitourinary: Negative for dysuria and frequency.   Musculoskeletal: Positive for arthralgias, gait problem and joint swelling. Negative for back pain and myalgias.   Skin: Positive for color change and  wound. Negative for rash.   Neurological: Positive for weakness and numbness. Negative for tremors, seizures and headaches.   Hematological: Does not bruise/bleed easily.     Objective:     Vital Signs (Most Recent):  Temp: 97.3 °F (36.3 °C) (05/05/22 1256)  Pulse: 79 (05/05/22 1256)  Resp: 18 (05/05/22 1256)  BP: (!) 154/61 (05/05/22 1256)  SpO2: 98 % (05/05/22 1256) Vital Signs (24h Range):  Temp:  [97 °F (36.1 °C)-102.2 °F (39 °C)] 97.3 °F (36.3 °C)  Pulse:  [69-92] 79  Resp:  [17-28] 18  SpO2:  [88 %-100 %] 98 %  BP: (118-181)/(50-75) 154/61     Weight: 90.4 kg (199 lb 3.2 oz)  Body mass index is 31.2 kg/m².    Foot Exam    Laboratory:  A1C:   Recent Labs   Lab 03/10/22  0650 05/05/22  0320   HGBA1C >14.0* 10.6*     CBC:   Recent Labs   Lab 05/05/22 0320   WBC 18.99*   RBC 3.24*   HGB 9.0*   HCT 27.4*      MCV 85   MCH 27.8   MCHC 32.8     CMP:   Recent Labs   Lab 05/04/22 2040 05/05/22  0320   * 321*   CALCIUM 8.8 9.1   ALBUMIN 3.5  --    PROT 8.0  --    * 131*   K 3.2* 3.4*   CO2 24 25   CL 84* 84*   BUN 51* 51*   CREATININE 9.9* 11.0*   ALKPHOS 60  --    ALT 11  --    AST 13  --    BILITOT 0.9  --      CRP:   Recent Labs   Lab 05/04/22 2040   CRP 32.78*     ESR: No results for input(s): SEDRATE in the last 168 hours.  Wound Cultures: No results for input(s): LABAERO in the last 4320 hours.    Diagnostic Results:  I have reviewed all pertinent imaging results/findings within the past 24 hours.     MRI Foot (Midfoot) Left Without Contrast  MRI of the left midfoot    Clinical history is foot swelling, diabetic, subcutaneous sore in the medial plantar soft tissues.    Multiplanar images of the left foot were obtained.    There is a 16 mm skin ulceration in the medial plantar soft tissues. There is moderate adjacent subcutaneous edema without evidence of abscess.    There is no marrow signal abnormality to suggest osteomyelitis.    There are moderate degenerative changes of the midfoot  predominantly involving the calcaneal cuboid joint. There is mild subchondral edema and subchondral cyst. There is joint space narrowing of the tarsometatarsal joints. There is mild subchondral edema within the base of the third metatarsal with small subchondral cysts as well as the middle cuneiform.    The visualized portion of the medial and lateral tendons are normal. There is atrophy of the interosseous muscles.    IMPRESSION: 16mm skin ulceration in the medial plantar soft tissues with associated cellulitis. There is no abscess or osteomyelitis    Moderate degenerative changes of the midfoot    Electronically signed by:  Denise Estrada MD  5/5/2022 12:45 PM CDT Workstation: [id number]  X-Ray Foot Complete Left  Three views of the left foot are compared to prior study dated 12/3/2021    Clinical history is infection    There is osteopenia. There has been prior amputation of the fourth digit at the base of the proximal phalanx.    There are moderate degenerative changes of the midfoot and hindfoot with joint space narrowing and spurring.. There are no fractures, dislocations or osteolysis. There is a subcutaneous ulceration the mid plantar soft tissues. There is moderate peripheral vascular calcification.    IMPRESSION: Prior amputation of fourth digit without radiographic evidence for osteomyelitis.    Moderate degenerative changes of the foot    16mm soft tissue ulceration in the mid plantar medial soft tissues    Electronically signed by:  Denise Estrada MD  5/5/2022 6:12 AM CDT Workstation: [id number]  X-Ray Chest 1 View  XR CHEST 1 VIEW    CLINICAL HISTORY:  56 years Female pain    COMPARISON: 5/19/2021    FINDINGS: Cardiomediastinal silhouette is within normal limits. Lungs are normally expanded with no airspace consolidation. No pleural effusion or pneumothorax. No acute osseous abnormality.    IMPRESSION: No acute pulmonary process.    Electronically signed by:  Denise Estrada MD  5/5/2022 6:10  AM CDT Workstation: [id number]        Clinical Findings:          Ulcerations present to the plantar and medial aspect of the left foot.  Large amount of callus is present surrounding the wounds.  Wounds extend to subcutaneous tissue on the medial aspect and fascia on the plantar aspect.  Mostly yellow slough in the wound bed.  There is mild surrounding edema and erythema.  No fluctuance.  No drainage is present.  Decreased protective sensation.  Decreased pedal pulses.  Previous partial amputation of the 4th toe.    Assessment/Plan:     Active Diagnoses:    Diagnosis Date Noted POA    PRINCIPAL PROBLEM:  Sepsis [A41.9] 05/05/2022 Yes    Diabetic ulcer of left midfoot [E11.621, L97.429] 05/05/2022 Yes    Enteritis [K52.9] 05/05/2022 Yes    Hypokalemia [E87.6] 05/05/2022 Yes    Elevated troponin [R77.8] 05/05/2022 Yes    Type 2 diabetes mellitus with kidney complication, with long-term current use of insulin [E11.29, Z79.4] 06/25/2020 Not Applicable    ESRD (end stage renal disease) [N18.6] 05/18/2018 Yes      Problems Resolved During this Admission:    Diagnosis Date Noted Date Resolved POA    Type 2 diabetes mellitus with hyperglycemia, without long-term current use of insulin [E11.65] 06/25/2020 05/05/2022 Yes       MRI was ordered and shows no evidence of abscess or osteomyelitis of the left foot.  I am going to order local wound care for the left foot with Santyl to the wounds.  Patient can follow-up in the outpatient wound care center for further management.    Thank you for your consult. I will sign off. Please contact us if you have any additional questions.    Ricardo Bruno DPM  Podiatry  [facility]

## 2022-05-05 NOTE — NURSING
56 yr old female  Awake and alert  In bed in dialysis   Left medial foot DM ulcers   2.5x2.5x1.2 pink and pale white wound bed undermining 12oclock 1cm         Left dorsal 2.8x1.8x.3 paaaale pink and white slough     Area cleaned and applied medihoney and covered

## 2022-05-05 NOTE — CARE UPDATE
05/04/22 2247   Patient Assessment/Suction   Level of Consciousness (AVPU) alert   Respiratory Effort Normal;Unlabored   Expansion/Accessory Muscles/Retractions no use of accessory muscles;no retractions;expansion symmetric   All Lung Fields Breath Sounds Anterior:;coarse;diminished   Rhythm/Pattern, Respiratory unlabored;pattern regular;depth regular   Cough Frequency frequent   Cough Type dry;good;nonproductive   PRE-TX-O2   O2 Device (Oxygen Therapy) nasal cannula   $ Is the patient on Low Flow Oxygen? Yes   Flow (L/min) 2   SpO2 99 %   Pulse Oximetry Type Intermittent   $ Pulse Oximetry - Multiple Charge Pulse Oximetry - Multiple   Pulse 91   Resp (!) 28   Aerosol Therapy   $ Aerosol Therapy Charges Aerosol Treatment   Daily Review of Necessity (SVN) completed   Respiratory Treatment Status (SVN) given   Treatment Route (SVN) oxygen;mask   Patient Position (SVN) HOB elevated   Post Treatment Assessment (SVN) breath sounds unchanged   Signs of Intolerance (SVN) none   Breath Sounds Post-Respiratory Treatment   Throughout All Fields Post-Treatment All Fields   Throughout All Fields Post-Treatment no change   Post-treatment Heart Rate (beats/min) 87   Post-treatment Resp Rate (breaths/min) 31   Education   $ Education Bronchodilator;15 min   Respiratory Evaluation   $ Care Plan Tech Time 15 min   $ Eval/Re-eval Charges Evaluation

## 2022-05-05 NOTE — ASSESSMENT & PLAN NOTE
"Admit to cardiology B   Not candidate for 30mL/kg bolus as she is ESRD on HD and not hypotensive, however does appear dry, will give 250cc bolus  Source: foot/enteritis       This patient does have evidence of infective focus  My overall impression is sepsis. Vital signs were reviewed and noted in progress note.  Antibiotics given-   Antibiotics (From admission, onward)            Start     Stop Route Frequency Ordered    05/05/22 0400  doxycycline (VIBRAMYCIN) 100mg in dextrose 5% 250 mL IVPB (ready to mix)         -- IV Every 12 hours (non-standard times) 05/04/22 2345    05/05/22 0045  piperacillin-tazobactam 3.375 g in dextrose 5 % 50 mL IVPB (ready to mix system)        Note to Pharmacy: Ht: 5' 7" (1.702 m)  Wt: 81.2 kg (179 lb)  Estimated Creatinine Clearance: 7 mL/min (A) (based on SCr of 9.9 mg/dL (H)).  Body mass index is 28.04 kg/m².    -- IV Every 12 hours (non-standard times) 05/04/22 2345    05/04/22 2330  meropenem-0.9% sodium chloride 1 g/50 mL IVPB         -- IV Once 05/04/22 2216        Cultures were taken-   Microbiology Results (last 7 days)     Procedure Component Value Units Date/Time    Blood culture #2 **CANNOT BE ORDERED STAT** [[id number]] Collected: 05/04/22 2155    Order Status: Sent Specimen: Blood from Peripheral, Antecubital, Right Updated: 05/04/22 2210    Blood culture #1 **CANNOT BE ORDERED STAT** [[id number]] Collected: 05/04/22 2040    Order Status: Sent Specimen: Blood from Peripheral, Antecubital, Right Updated: 05/04/22 2056        Latest lactate reviewed, they are-  Recent Labs   Lab 05/04/22 2040   LACTATE 1.8         Source- foot/GI    Source control Achieved by- abx as above    "

## 2022-05-05 NOTE — CONSULTS
Consult Note  Infectious Disease    Reason for Consult:  Positive blood cultures    HPI: Leanna García is a 56 y.o. female known to me from prior consultation in September of 2018, was admitted through the emergency room yesterday with fever 103,   Vomiting of 1 day's duration, and chronic sinus congestion, postnasal drip, cough at least several weeks duration and a foot ulcer on the plantar/medial surface surface of the left foot of probably several months duration.  The vomiting began within a few hours of a meal at a [employer] restaurant.  She did not have any diarrhea In the emergency room she had a CT scan of the abdomen which suggested possible enteritis and bilateral perinephric fat stranding, as well as cholelithiasis, and extensive vascular calcifications.  her blood sugar was 446, white blood cells 21,000, normal lactic acid.  Photographs taken in the emergency room suggest cellulitis of the medial and plantar foot She was started on Zosyn and doxycycline as she has history of vancomycin allergy. .  She was seen by Podiatry , and MRI did not demonstrate any abscess or drainage and wound care was ordered.  She was noted this morning to have difficulty swallowing and was seen by speech therapy.  Today 1 anaerobic bottle has a Gram-positive mariana prompting this consult.    Review of patient's allergies indicates:   Allergen Reactions    Vancomycin Itching    Chlorhexidine Itching     Past Medical History:   Diagnosis Date    A-fib     resolved per patient    Arthritis     Bronchitis     Cardiovascular event risk, ASCVD 10-year risk 6.7% 10/15/2016    Diabetes mellitus     Diabetes mellitus type II     Diabetic ulcer of left midfoot 5/5/2022    Disorder of kidney and ureter     Encounter for blood transfusion     ESRD (end stage renal disease) 5/18/2018    MANJINDER (generalized anxiety disorder) 10/25/2016    History of colon polyps 11/02/2016    Hyperlipidemia     Hypertension     Stroke     cephalic vein superficial thrombophlebitis 9/2018  Osteomyelitis of the foot, amputation of right 4th toe  Reactive airways disease  Diabetic neuropathy and nephropathy  Diastolic heart failure  Charcot arthropathy    Past Surgical History:   Procedure Laterality Date    COLONOSCOPY N/A 10/5/2016    Procedure: COLONOSCOPY;  Surgeon: Pillo Chanel MD;  Location: [facility] ENDO;  Service: Endoscopy;  Laterality: N/A;    COLONOSCOPY N/A 4/26/2022    Procedure: COLONOSCOPY;  Surgeon: Saravanan Rachel MD;  Location: [facility] ENDO;  Service: Endoscopy;  Laterality: N/A;    HERNIA REPAIR Bilateral 11/22/2016    inguinal    HYSTERECTOMY      OOPHORECTOMY       Social History     Socioeconomic History    Marital status:    Tobacco Use    Smoking status: Never Smoker    Smokeless tobacco: Never Used   Substance and Sexual Activity    Alcohol use: No    Drug use: No    Sexual activity: Yes     Partners: Male   Social History Narrative    Caregiver      Social Determinants of Health     Financial Resource Strain: Low Risk     Difficulty of Paying Living Expenses: Not very hard   Food Insecurity: Unknown    Worried About Running Out of Food in the Last Year: Patient refused    Ran Out of Food in the Last Year: Patient refused   Transportation Needs: Unknown    Lack of Transportation (Medical): Patient refused    Lack of Transportation (Non-Medical): Patient refused   Physical Activity: Insufficiently Active    Days of Exercise per Week: 7 days    Minutes of Exercise per Session: 20 min   Stress: No Stress Concern Present    Feeling of Stress : Only a little   Social Connections: Unknown    Frequency of Communication with Friends and Family: Patient refused    Frequency of Social Gatherings with Friends and Family: Patient refused    Active Member of Clubs or Organizations: No    Marital Status:      Family History   Problem Relation Age of Onset    Hyperlipidemia Mother      Hypertension Mother     Hypertension Father     Diabetes Father     Diabetes Sister     Diabetes Sister     Diabetes Maternal Aunt     Diabetes Maternal Grandmother     Heart disease Maternal Grandmother     Cancer Paternal Grandmother     Breast cancer Neg Hx     Colon cancer Neg Hx     Ovarian cancer Neg Hx          Review of Systems:    chills, fever, sweats,   No change in vision, loss of vision or diplopia  Chronic sinus congestion, post nasal drip , without facial pain.  The postnasal drip has caused her to cough frequently.  This has not been responsive to Claritin.  She does not smoke  No pain in mouth or throat. No problems with teeth, gums.  No chest pain, palpitations, syncope   cough, and ultimately with some shortness of breath,      nausea, vomiting, without diarrhea, constipation, blood in stool, or focal abd pain,   She had a colonoscopy on 04/26 with polypectomy, done as part of a pre transplant workup.  She did have tubulovillous adenomas and hyperplastic polyps, negative for high-grade dysplasia or malignancy     No swelling of joints, redness of joints, injuries, or new focal pain.  She is able to walk but limited by pain in her feet.  No unusual headaches, dizziness, but does have peripheral neuropathy from diabetes  No anxiety, depression,   Diabetes with hemoglobin A1c greater than 14 in early March and 10.6 today.  No bleeding, lymphadenopathy,  malignancy, unusual bruising  No new rashes, lesions, or wounds     Implants:  None  Antibiotic History:  Not applicable    EXAM & DIAGNOSTICS REVIEWED:   Vitals:     Temp:  [97 °F (36.1 °C)-102.2 °F (39 °C)]   Temp: 97.3 °F (36.3 °C) (05/05/22 1256)  Pulse: 80 (05/05/22 1309)  Resp: 18 (05/05/22 1256)  BP: (!) 154/61 (05/05/22 1256)  SpO2: 95 % (05/05/22 1309)    Intake/Output Summary (Last 24 hours) at 5/5/2022 1335  Last data filed at 5/5/2022 1258  Gross per 24 hour   Intake 600 ml   Output --   Net 600 ml       General:  In NAD. Alert  and attentive, cooperative, comfortable but constantly coughing.  Seen in dialysis  Eyes:  Anicteric, PERRL, EOMI  ENT:  No ulcers, exudates, thrush, nares patent,    Neck:  supple, no masses or adenopathy appreciated  Lungs: No consolidation, occasional rhonchi and rare wheezes  Heart:  RRR, no gallop/murmur/rub noted  Abd:  Soft, NT, mildly distended, normal BS, no masses or organomegaly appreciated.  :  Voids  no flank tenderness  Musc:  Joints without effusion, swelling, erythema, synovitis but she does have lower extremity muscle wasting.  She has Charcot arthropathy with fallen arches bilaterally and an ulceration on the medial arch   Skin:  No rashes. No palmar or plantar lesions. No subungual petechiae  Neuro:             Alert, attentive, speech fluent, face symmetric, moves all extremities, no focal weakness.  Minimally Ambulatory  Psych: Calm, cooperative  Lymphatic:     No cervical, supraclavicular, nodes  Extrem: No edema, erythema, phlebitis,  , warm and well perfused  VAD:  Left arm AV fistula is currently accessed.  Dialysis nurse states that the site had no signs of inflammation.     Isolation:    Wound:               Lines/Tubes/Drains:    General Labs reviewed:  Recent Labs   Lab 05/04/22 2040 05/05/22  0320   WBC 21.06* 18.99*   HGB 9.2* 9.0*   HCT 28.5* 27.4*    276       Recent Labs   Lab 05/04/22 2040 05/05/22  0320   * 131*   K 3.2* 3.4*   CL 84* 84*   CO2 24 25   BUN 51* 51*   CREATININE 9.9* 11.0*   CALCIUM 8.8 9.1   PROT 8.0  --    BILITOT 0.9  --    ALKPHOS 60  --    ALT 11  --    AST 13  --      Recent Labs   Lab 05/04/22 2040   CRP 32.78*         Micro:  Microbiology Results (last 7 days)     Procedure Component Value Units Date/Time    Blood culture #1 **CANNOT BE ORDERED STAT** [[id number]] Collected: 05/04/22 2040    Order Status: Completed Specimen: Blood from Peripheral, Antecubital, Right Updated: 05/05/22 1052     Blood Culture, Routine Gram stain lukasz bottle:  Gram positive rods      Results called to and read back by:Landy Nation [id number];  05/05/2022        10:52 CJD    Blood culture #2 **CANNOT BE ORDERED STAT** [[id number]] Collected: 05/04/22 2155    Order Status: Completed Specimen: Blood from Peripheral, Antecubital, Right Updated: 05/05/22 0517     Blood Culture, Routine No Growth to date          Imaging Reviewed:   CXR   CT abdomen and pelvis 5/4   1.  Small to moderate amount of fluid in the small intestines with scattered air fluid levels, possibly enteritis 2.  Moderate bilateral perinephric stranding. Correlate with urinalysis to exclude the possibility of pyelonephritis 3.  Gallbladder distention with gallstones     MRI of the left midfoot 5/5  IMPRESSION: 16mm skin ulceration in the medial plantar soft tissues with associated cellulitis. There is no abscess or osteomyelitis   Moderate degenerative changes of the midfoot    Cardiology:    IMPRESSION & PLAN   1.  Positive blood culture of unclear significance   Bacillus cereus,, associated with Chinese restaurant related food poisoning/fried rice, is a Gram-positive mariana   If this is an anaerobe, we might related to the recent polypectomies but fortunately there is no sign of perforation on her CT scan    2.  If the above blood culture is a contaminant, upper respiratory infection, acute on chronic allergic rhinitis and bronchitis could also be the cause.  3.  Left foot ulcer does not appear acutely infected and MRI is negative      Recommendations:  Continue Zosyn, switching doxycycline to Levaquin which would cover bacillus  Respiratory PCR panel  Add Astelin nasal spray  Antitussives  Bronchodilators of already been ordered for today  Will follow-up on the cultures    Medical Decision Making during this encounter was  [_] Low Complexity  [_] Moderate Complexity  [  ] High Complexity

## 2022-05-05 NOTE — PLAN OF CARE
[facility]  Initial Discharge Assessment       Primary Care Provider: Stefano Lopez MD    Admission Diagnosis: Sepsis [A41.9]    Admission Date: 5/4/2022  Expected Discharge Date:      Assessment completed with pt at bedside. Pt lives with spouse, and plans to return home at discharge. She had home health in the past, none at present. Pt has questions re: her medical condition, which  encouraged pt to discuss with MD. No needs voiced at this time.      Discharge Barriers Identified: None    Payor: [employer] MANAGED MEDICARE / Plan: [employer] MEDICARE PPO / Product Type: Medicare Advantage /     Extended Emergency Contact Information  Primary Emergency Contact: RaquelLizziey  Address: [address] [place] [address] [place]  Mobile Phone: [phone]  Relation: Spouse  Preferred language: English   needed? No  Secondary Emergency Contact: Sonia Cota  Address: [address] [place] [address] [place]  Mobile Phone: [phone]  Relation: Mother  Preferred language: English   needed? No    Discharge Plan A: Home with family  Discharge Plan B: Other      [facility] [place] [address]  [place] [address] [facility]  [address] [facility] [address]  Phone: [phone] Fax: [phone]    [id number] [place], [place] - [address]  [address] [facility]  [place] [address]  Phone: [phone] Fax: [phone]      Initial Assessment (most recent)     Adult Discharge Assessment - 05/05/22 1036        Discharge Assessment    Assessment Type Discharge Planning Assessment     Confirmed/corrected address, phone number and insurance Yes     Confirmed Demographics Correct on Facesheet     Source of Information patient;health record     When was your last doctors appointment? 04/28/22     Communicated ERI with patient/caregiver Date not available/Unable to determine     Reason  For Admission sepsis     Lives With spouse     Do you expect to return to your current living situation? Yes     Do you have help at home or someone to help you manage your care at home? Yes     Who are your caregiver(s) and their phone number(s)? Spouse Donovan García [phone]     Prior to hospitilization cognitive status: Alert/Oriented     Current cognitive status: Alert/Oriented     Walking or Climbing Stairs Difficulty ambulation difficulty, requires equipment     Mobility Management Uses rolling walker or rollator as needed     Dressing/Bathing Difficulty none     Home Layout Able to live on 1st floor     Equipment Currently Used at Home rollator;walker, rolling;other (see comments)   pt reports she has handles by the toilet    Readmission within 30 days? No     Patient currently being followed by outpatient case management? No     Do you currently have service(s) that help you manage your care at home? No   Pt reports she had home health in the past, none at present.    Do you take prescription medications? Yes     Do you have prescription coverage? Yes     Coverage [employer]     Do you have any problems affording any of your prescribed medications? No     Is the patient taking medications as prescribed? yes     Who is going to help you get home at discharge? spouse     How do you get to doctors appointments? family or friend will provide     Are you on dialysis? Yes     Dialysis Name and Scheduled days MWF [facility] 7:45 AM     Do you take coumadin? No     Discharge Plan A Home with family     Discharge Plan B Other     DME Needed Upon Discharge  none     Discharge Plan discussed with: Patient     Discharge Barriers Identified None

## 2022-05-06 ENCOUNTER — CLINICAL SUPPORT (OUTPATIENT)
Dept: CARDIOLOGY | Facility: HOSPITAL | Age: 57
DRG: 853 | End: 2022-05-06
Attending: INTERNAL MEDICINE
Payer: MEDICARE

## 2022-05-06 VITALS — HEIGHT: 67 IN | BODY MASS INDEX: 29.98 KG/M2 | WEIGHT: 191 LBS

## 2022-05-06 LAB
ANION GAP SERPL CALC-SCNC: 13 MMOL/L (ref 8–16)
BASOPHILS # BLD AUTO: 0.03 K/UL (ref 0–0.2)
BASOPHILS NFR BLD: 0.2 % (ref 0–1.9)
BSA FOR ECHO PROCEDURE: 2.02 M2
BUN SERPL-MCNC: 33 MG/DL (ref 6–20)
CALCIUM SERPL-MCNC: 9.2 MG/DL (ref 8.7–10.5)
CHLORIDE SERPL-SCNC: 94 MMOL/L (ref 95–110)
CO2 SERPL-SCNC: 28 MMOL/L (ref 23–29)
CREAT SERPL-MCNC: 7.6 MG/DL (ref 0.5–1.4)
DIFFERENTIAL METHOD: ABNORMAL
EJECTION FRACTION: 65 %
EOSINOPHIL # BLD AUTO: 0 K/UL (ref 0–0.5)
EOSINOPHIL NFR BLD: 0.1 % (ref 0–8)
ERYTHROCYTE [DISTWIDTH] IN BLOOD BY AUTOMATED COUNT: 14.3 % (ref 11.5–14.5)
EST. GFR  (AFRICAN AMERICAN): 6.3 ML/MIN/1.73 M^2
EST. GFR  (NON AFRICAN AMERICAN): 5.4 ML/MIN/1.73 M^2
GLUCOSE SERPL-MCNC: 159 MG/DL (ref 70–110)
GLUCOSE SERPL-MCNC: 171 MG/DL (ref 70–110)
GLUCOSE SERPL-MCNC: 188 MG/DL (ref 70–110)
GLUCOSE SERPL-MCNC: 194 MG/DL (ref 70–110)
GLUCOSE SERPL-MCNC: 203 MG/DL (ref 70–110)
GLUCOSE SERPL-MCNC: 207 MG/DL (ref 70–110)
HCT VFR BLD AUTO: 24.6 % (ref 37–48.5)
HGB BLD-MCNC: 7.8 G/DL (ref 12–16)
IMM GRANULOCYTES # BLD AUTO: 0.13 K/UL (ref 0–0.04)
IMM GRANULOCYTES NFR BLD AUTO: 0.7 % (ref 0–0.5)
LYMPHOCYTES # BLD AUTO: 1.2 K/UL (ref 1–4.8)
LYMPHOCYTES NFR BLD: 6.4 % (ref 18–48)
MCH RBC QN AUTO: 27.8 PG (ref 27–31)
MCHC RBC AUTO-ENTMCNC: 31.7 G/DL (ref 32–36)
MCV RBC AUTO: 88 FL (ref 82–98)
MONOCYTES # BLD AUTO: 1.9 K/UL (ref 0.3–1)
MONOCYTES NFR BLD: 10.3 % (ref 4–15)
NEUTROPHILS # BLD AUTO: 14.8 K/UL (ref 1.8–7.7)
NEUTROPHILS NFR BLD: 82.3 % (ref 38–73)
NRBC BLD-RTO: 0 /100 WBC
PLATELET # BLD AUTO: 313 K/UL (ref 150–450)
PMV BLD AUTO: 10.6 FL (ref 9.2–12.9)
POTASSIUM SERPL-SCNC: 4 MMOL/L (ref 3.5–5.1)
RBC # BLD AUTO: 2.81 M/UL (ref 4–5.4)
SODIUM SERPL-SCNC: 135 MMOL/L (ref 136–145)
WBC # BLD AUTO: 18 K/UL (ref 3.9–12.7)

## 2022-05-06 PROCEDURE — 87147 CULTURE TYPE IMMUNOLOGIC: CPT | Performed by: PODIATRIST

## 2022-05-06 PROCEDURE — 63600175 PHARM REV CODE 636 W HCPCS: Performed by: NURSE PRACTITIONER

## 2022-05-06 PROCEDURE — 80048 BASIC METABOLIC PNL TOTAL CA: CPT | Performed by: NURSE PRACTITIONER

## 2022-05-06 PROCEDURE — 87070 CULTURE OTHR SPECIMN AEROBIC: CPT | Performed by: PODIATRIST

## 2022-05-06 PROCEDURE — 11043 DEBRIDEMENT: ICD-10-PCS | Mod: ,,, | Performed by: PODIATRIST

## 2022-05-06 PROCEDURE — 21400001 HC TELEMETRY ROOM

## 2022-05-06 PROCEDURE — 11042 DBRDMT SUBQ TIS 1ST 20SQCM/<: CPT | Mod: 59,,, | Performed by: PODIATRIST

## 2022-05-06 PROCEDURE — 99232 SBSQ HOSP IP/OBS MODERATE 35: CPT | Mod: ,,, | Performed by: INTERNAL MEDICINE

## 2022-05-06 PROCEDURE — 63600175 PHARM REV CODE 636 W HCPCS: Performed by: INTERNAL MEDICINE

## 2022-05-06 PROCEDURE — 99232 PR SUBSEQUENT HOSPITAL CARE,LEVL II: ICD-10-PCS | Mod: ,,, | Performed by: INTERNAL MEDICINE

## 2022-05-06 PROCEDURE — 36415 COLL VENOUS BLD VENIPUNCTURE: CPT | Performed by: INTERNAL MEDICINE

## 2022-05-06 PROCEDURE — 25000242 PHARM REV CODE 250 ALT 637 W/ HCPCS: Performed by: INTERNAL MEDICINE

## 2022-05-06 PROCEDURE — 25000003 PHARM REV CODE 250: Performed by: PODIATRIST

## 2022-05-06 PROCEDURE — 99900035 HC TECH TIME PER 15 MIN (STAT)

## 2022-05-06 PROCEDURE — 25000003 PHARM REV CODE 250: Performed by: INTERNAL MEDICINE

## 2022-05-06 PROCEDURE — 11042 DEBRIDEMENT: ICD-10-PCS | Mod: 59,,, | Performed by: PODIATRIST

## 2022-05-06 PROCEDURE — 94761 N-INVAS EAR/PLS OXIMETRY MLT: CPT

## 2022-05-06 PROCEDURE — 87077 CULTURE AEROBIC IDENTIFY: CPT | Performed by: PODIATRIST

## 2022-05-06 PROCEDURE — 93308 TTE F-UP OR LMTD: CPT

## 2022-05-06 PROCEDURE — 11043 DBRDMT MUSC&/FSCA 1ST 20/<: CPT | Mod: ,,, | Performed by: PODIATRIST

## 2022-05-06 PROCEDURE — 25000003 PHARM REV CODE 250: Performed by: NURSE PRACTITIONER

## 2022-05-06 PROCEDURE — 63600175 PHARM REV CODE 636 W HCPCS: Mod: JG | Performed by: INTERNAL MEDICINE

## 2022-05-06 PROCEDURE — 99232 PR SUBSEQUENT HOSPITAL CARE,LEVL II: ICD-10-PCS | Mod: ,,, | Performed by: PODIATRIST

## 2022-05-06 PROCEDURE — 90935 HEMODIALYSIS ONE EVALUATION: CPT

## 2022-05-06 PROCEDURE — 93308 TTE F-UP OR LMTD: CPT | Mod: 26,,, | Performed by: INTERNAL MEDICINE

## 2022-05-06 PROCEDURE — 97605 NEG PRS WND THER DME<=50SQCM: CPT

## 2022-05-06 PROCEDURE — 87186 SC STD MICRODIL/AGAR DIL: CPT | Performed by: PODIATRIST

## 2022-05-06 PROCEDURE — 85025 COMPLETE CBC W/AUTO DIFF WBC: CPT | Performed by: NURSE PRACTITIONER

## 2022-05-06 PROCEDURE — 99232 SBSQ HOSP IP/OBS MODERATE 35: CPT | Mod: ,,, | Performed by: PODIATRIST

## 2022-05-06 PROCEDURE — 87040 BLOOD CULTURE FOR BACTERIA: CPT | Performed by: INTERNAL MEDICINE

## 2022-05-06 PROCEDURE — 93308 ECHO (CUPID ONLY): ICD-10-PCS | Mod: 26,,, | Performed by: INTERNAL MEDICINE

## 2022-05-06 PROCEDURE — P9047 ALBUMIN (HUMAN), 25%, 50ML: HCPCS | Mod: JG | Performed by: INTERNAL MEDICINE

## 2022-05-06 PROCEDURE — 87075 CULTR BACTERIA EXCEPT BLOOD: CPT | Performed by: PODIATRIST

## 2022-05-06 PROCEDURE — 36415 COLL VENOUS BLD VENIPUNCTURE: CPT | Performed by: NURSE PRACTITIONER

## 2022-05-06 RX ORDER — ALBUMIN HUMAN 250 G/1000ML
25 SOLUTION INTRAVENOUS ONCE
Status: COMPLETED | OUTPATIENT
Start: 2022-05-06 | End: 2022-05-06

## 2022-05-06 RX ORDER — CODEINE PHOSPHATE AND GUAIFENESIN 10; 100 MG/5ML; MG/5ML
5 SOLUTION ORAL EVERY 6 HOURS PRN
Status: DISCONTINUED | OUTPATIENT
Start: 2022-05-06 | End: 2022-05-13

## 2022-05-06 RX ORDER — ALBUMIN HUMAN 250 G/1000ML
SOLUTION INTRAVENOUS
Status: DISPENSED
Start: 2022-05-06 | End: 2022-05-07

## 2022-05-06 RX ADMIN — ATORVASTATIN CALCIUM 80 MG: 40 TABLET, FILM COATED ORAL at 09:05

## 2022-05-06 RX ADMIN — HYDROCODONE BITARTRATE AND ACETAMINOPHEN 1 TABLET: 5; 325 TABLET ORAL at 04:05

## 2022-05-06 RX ADMIN — DAPTOMYCIN 500 MG: 500 INJECTION, POWDER, LYOPHILIZED, FOR SOLUTION INTRAVENOUS at 11:05

## 2022-05-06 RX ADMIN — GABAPENTIN 300 MG: 300 CAPSULE ORAL at 09:05

## 2022-05-06 RX ADMIN — MUPIROCIN: 20 OINTMENT TOPICAL at 09:05

## 2022-05-06 RX ADMIN — INSULIN DETEMIR 20 UNITS: 100 INJECTION, SOLUTION SUBCUTANEOUS at 09:05

## 2022-05-06 RX ADMIN — ALBUMIN (HUMAN) 25 G: 12.5 SOLUTION INTRAVENOUS at 12:05

## 2022-05-06 RX ADMIN — INSULIN ASPART 2 UNITS: 100 INJECTION, SOLUTION INTRAVENOUS; SUBCUTANEOUS at 04:05

## 2022-05-06 RX ADMIN — HYDROCODONE BITARTRATE AND ACETAMINOPHEN 1 TABLET: 5; 325 TABLET ORAL at 08:05

## 2022-05-06 RX ADMIN — GUAIFENESIN AND CODEINE PHOSPHATE 5 ML: 100; 10 SOLUTION ORAL at 10:05

## 2022-05-06 RX ADMIN — IPRATROPIUM BROMIDE AND ALBUTEROL SULFATE 3 ML: .5; 3 SOLUTION RESPIRATORY (INHALATION) at 08:05

## 2022-05-06 RX ADMIN — AZELASTINE HYDROCHLORIDE 137 MCG: 137 SPRAY, METERED NASAL at 09:05

## 2022-05-06 RX ADMIN — METOPROLOL TARTRATE 25 MG: 25 TABLET, FILM COATED ORAL at 09:05

## 2022-05-06 RX ADMIN — ONDANSETRON 4 MG: 2 INJECTION INTRAMUSCULAR; INTRAVENOUS at 01:05

## 2022-05-06 RX ADMIN — THERA TABS 1 TABLET: TAB at 08:05

## 2022-05-06 RX ADMIN — EPOETIN ALFA-EPBX 4500 UNITS: 10000 INJECTION, SOLUTION INTRAVENOUS; SUBCUTANEOUS at 12:05

## 2022-05-06 RX ADMIN — ACETAMINOPHEN 650 MG: 325 TABLET, FILM COATED ORAL at 01:05

## 2022-05-06 RX ADMIN — METOPROLOL TARTRATE 25 MG: 25 TABLET, FILM COATED ORAL at 08:05

## 2022-05-06 RX ADMIN — ASPIRIN 81 MG: 81 TABLET, COATED ORAL at 08:05

## 2022-05-06 RX ADMIN — COLLAGENASE SANTYL: 250 OINTMENT TOPICAL at 11:05

## 2022-05-06 RX ADMIN — OXYCODONE HYDROCHLORIDE AND ACETAMINOPHEN 500 MG: 500 TABLET ORAL at 08:05

## 2022-05-06 RX ADMIN — CALCIUM ACETATE 1334 MG: 667 CAPSULE ORAL at 04:05

## 2022-05-06 NOTE — PROGRESS NOTES
HD tx complete, 25g albumin given to help maintain BP, unsuccessful. Patient complains of pain all over and requested to end treatment early. Dr. Carlson notified.        05/06/22 1420   Required for all Hemodialysis Patients   Hepatitis Status negative   Handoff Report   Received From [facility]   Given To [facility]   Treatment Type   Treatment Type Maintenance   Vital Signs   Temp 97.7 °F (36.5 °C)   Temp src Oral   Pulse 85   Heart Rate Source Monitor   Resp 20   SpO2 95 %   Pulse Oximetry Type Intermittent   O2 Device (Oxygen Therapy) nasal cannula   /76   BP Location Right arm   BP Method Automatic   Patient Position Lying   Assessments (Pre/Post)   Consent Obtained yes   Safety vein preservation armband present   Date Hepatitis Profile Obtained 03/10/22   Blood Liters Processed (BLP) 24   Transport Modality bed   Level of Consciousness (AVPU) alert   Dialyzer Clearance moderately streaked   Post-Hemodialysis Assessment   Rinseback Volume (mL) 250 mL   Blood Volume Processed (Liters) 24 L   Dialyzer Clearance Lightly streaked   Duration of Treatment 90 minutes   Additional Fluid Intake (mL) 500 mL   Total UF (mL) 600 mL   Net Fluid Removal 100   Patient Response to Treatment Tolerated poorly, unable to maintain stable BP   Post-Treatment Weight 86.5 kg (190 lb 11.2 oz)   Treatment Weight Change -0.1   Arterial bleeding stop time (min) 5 min   Venous bleeding stop time (min) 5 min   Post-Hemodialysis Comments Tx completed per protocol, all blood returned without incident.   Edema   Edema generalized

## 2022-05-06 NOTE — PROGRESS NOTES
Consult Note  Infectious Disease    Reason for Consult:  Positive blood cultures    HPI: Leanna García is a 56 y.o. female known to me from prior consultation in September of 2018, was admitted through the emergency room yesterday with fever 103,   Vomiting of 1 day's duration, and chronic sinus congestion, postnasal drip, cough at least several weeks duration and a foot ulcer on the plantar/medial surface surface of the left foot of probably several months duration.  The vomiting began within a few hours of a meal at a [employer] restaurant.  She did not have any diarrhea In the emergency room she had a CT scan of the abdomen which suggested possible enteritis and bilateral perinephric fat stranding, as well as cholelithiasis, and extensive vascular calcifications.  her blood sugar was 446, white blood cells 21,000, normal lactic acid.  Photographs taken in the emergency room suggest cellulitis of the medial and plantar foot She was started on Zosyn and doxycycline as she has history of vancomycin allergy. .  She was seen by Podiatry , and MRI did not demonstrate any abscess or drainage and wound care was ordered.  She was noted this morning to have difficulty swallowing and was seen by speech therapy.  Today 1 anaerobic bottle has a Gram-positive mariana prompting this consult.    5/6: interim reviewed. Still febrile through last night. Blood culture does have characteristics of Bacillus cereus. [facility]. Respiratory pcr panel was negative. She was able to eat solid food this am. She denies abdominal pain. Podiatry debrided her foot this am and submitted cultures and is ordering a WBC Scan. Coughing less. No wheezes.       EXAM & DIAGNOSTICS REVIEWED:   Vitals:     Temp:  [97 °F (36.1 °C)-103.1 °F (39.5 °C)]   Temp: 97.5 °F (36.4 °C) (05/06/22 0729)  Pulse: 69 (05/06/22 0729)  Resp: 17 (05/06/22 0816)  BP: (!) 106/58 (05/06/22 0729)  SpO2: 95 % (05/06/22  0729)    Intake/Output Summary (Last 24 hours) at 5/6/2022 0832  Last data filed at 5/6/2022 0355  Gross per 24 hour   Intake 1100 ml   Output 2620 ml   Net -1520 ml       General:  In NAD. Alert and attentive, cooperative, comfortable     Eyes:  Anicteric,  EOMI  ENT:  No ulcers, exudates, thrush, nares patent,    Neck:  Supple,   Lungs: Clear today  Heart:  RRR, 2/6 systolic murmur, ?MR  Abd:  Soft, NT, mildly distended, normal BS, no masses or organomegaly appreciated.  :  Voids  no flank tenderness  Musc:  Joints without effusion, swelling, erythema, synovitis but she does have lower extremity muscle wasting.  She has Charcot arthropathy with fallen arches bilaterally and an ulceration on the medial arch   Skin:  No rashes   Neuro:             Alert, attentive, speech fluent, face symmetric, moves all extremities, no focal weakness.  Minimally Ambulatory at baseline  Psych:  Calm, cooperative  Lymphatic:        Extrem: No edema, erythema, phlebitis,  , warm and well perfused  VAD:  Left arm AV fistula  No redness     Isolation:    Wound:           5/6:   This was debrided full thickness this am         General Labs reviewed:  Recent Labs   Lab 05/04/22 2040 05/05/22 0320 05/06/22  0414   WBC 21.06* 18.99* 18.00*   HGB 9.2* 9.0* 7.8*   HCT 28.5* 27.4* 24.6*    276 313       Recent Labs   Lab 05/04/22 2040 05/05/22 0320 05/06/22  0414   * 131* 135*   K 3.2* 3.4* 4.0   CL 84* 84* 94*   CO2 24 25 28   BUN 51* 51* 33*   CREATININE 9.9* 11.0* 7.6*   CALCIUM 8.8 9.1 9.2   PROT 8.0  --   --    BILITOT 0.9  --   --    ALKPHOS 60  --   --    ALT 11  --   --    AST 13  --   --      Recent Labs   Lab 05/04/22 2040   CRP 32.78*         Micro:  Microbiology Results (last 7 days)     Procedure Component Value Units Date/Time    Culture, Anaerobic [[id number]] Collected: 05/06/22 0753    Order Status: Sent Specimen: Wound from Foot, Left Updated: 05/06/22 0817    Aerobic culture [[id number]] Collected:  05/06/22 0753    Order Status: Sent Specimen: Wound from Foot, Left Updated: 05/06/22 0817    Blood culture #2 **CANNOT BE ORDERED STAT** [[id number]] Collected: 05/04/22 2155    Order Status: Completed Specimen: Blood from Peripheral, Antecubital, Right Updated: 05/05/22 2232     Blood Culture, Routine No Growth to date      No Growth to date    Respiratory Infection Panel (PCR), Nasopharyngeal [[id number]] Collected: 05/05/22 1826    Order Status: Completed Specimen: Nasopharyngeal Swab Updated: 05/05/22 2126     Respiratory Infection Panel Source NP swab     Adenovirus Not Detected     Coronavirus 229E, Common Cold Virus Not Detected     Coronavirus HKU1, Common Cold Virus Not Detected     Coronavirus NL63, Common Cold Virus Not Detected     Coronavirus OC43, Common Cold Virus Not Detected     Comment: The Coronavirus strains detected in this test cause the common cold.  These strains are not the COVID-19 (novel Coronavirus)strain   associated with the respiratory disease outbreak.          SARS-CoV2 (COVID-19) Qualitative PCR Not Detected     Human Metapneumovirus Not Detected     Human Rhinovirus/Enterovirus Not Detected     Influenza A (subtypes H1, H1-2009,H3) Not Detected     Influenza B Not Detected     Parainfluenza Virus 1 Not Detected     Parainfluenza Virus 2 Not Detected     Parainfluenza Virus 3 Not Detected     Parainfluenza Virus 4 Not Detected     Respiratory Syncytial Virus Not Detected     Bordetella Parapertussis (WN4104) Not Detected     Bordetella pertussis (ptxP) Not Detected     Chlamydia pneumoniae Not Detected     Mycoplasma pneumoniae Not Detected     Comment: Respiratory Infection Panel testing performed by Multiplex PCR.       Narrative:      Respiratory Infection Panel source->NP Swab    Blood culture #1 **CANNOT BE ORDERED STAT** [[id number]] Collected: 05/04/22 2040    Order Status: Completed Specimen: Blood from Peripheral, Antecubital, Right Updated: 05/05/22 1052     Blood Culture,  Routine Gram stain lukasz bottle: Gram positive rods      Results called to and read back by:Landy Nation RN-[id number];  05/05/2022        10:52 CJD          Imaging Reviewed:   CXR   CT abdomen and pelvis 5/4   1.  Small to moderate amount of fluid in the small intestines with scattered air fluid levels, possibly enteritis 2.  Moderate bilateral perinephric stranding. Correlate with urinalysis to exclude the possibility of pyelonephritis 3.  Gallbladder distention with gallstones     MRI of the left midfoot 5/5  IMPRESSION: 16mm skin ulceration in the medial plantar soft tissues with associated cellulitis. There is no abscess or osteomyelitis   Moderate degenerative changes of the midfoot    Cardiology:    IMPRESSION & PLAN   1.  Positive blood culture of unclear significance   It may be Bacillus cereus,, associated with Chinese restaurant related food poisoning/fried rice, is a Gram-positive mariana. Being [facility] to [facility] for MALDI   If this is an anaerobe, we might related to the recent polypectomies but fortunately there is no sign of perforation on her CT scan   Heart murmur, ?MR    2.  If the above blood culture is a contaminant, upper respiratory infection, acute on chronic allergic rhinitis and bronchitis could also be the cause. resp pcr panel negative    3.  Left foot ulcer , debrided per podiatry 5/6 and MRI is negative      Recommendations:  Continue Zosyn,   Levaquin both of which would cover bacillus   (she is allergic to vancomycin) so will add daptomycin pending ID   echocardiogram      Astelin nasal spray, Antitussives, Bronchodilators   Will follow-up on the tissue cultures  Repeat blood culture today  WBC scan ordered per podiatry to look for bone infection in foot  Dr. giraldo covering this weekend    D/w podiatry and nursing    Medical Decision Making during this encounter was  [_] Low Complexity  [_] Moderate Complexity  [x  ] High Complexity

## 2022-05-06 NOTE — NURSING
56 yr old female.   Order to place NPWT to the left foot wound   Dressing removed and area cleaned pat dry  applied benzoin to the periwound   3x6x1+  [place] 50%    Drape applied across the center green faom x2cut to fit and placed  Drape with hole and topper placed and draped with dome placed   To suction at 125mmHg and holding

## 2022-05-06 NOTE — PLAN OF CARE
05/05/22 1900   Patient Assessment/Suction   Level of Consciousness (AVPU) alert   Respiratory Effort Unlabored   Expansion/Accessory Muscles/Retractions expansion symmetric   All Lung Fields Breath Sounds coarse   Rhythm/Pattern, Respiratory depth regular;pattern regular   Cough Frequency infrequent   Cough Type good;nonproductive   PRE-TX-O2   O2 Device (Oxygen Therapy) nasal cannula   $ Is the patient on Low Flow Oxygen? Yes   Flow (L/min) 2   SpO2 97 %   Pulse Oximetry Type Intermittent   $ Pulse Oximetry - Single Charge Pulse Oximetry - Single   Pulse 79   Resp 16   Aerosol Therapy   $ Aerosol Therapy Charges Aerosol Treatment   Daily Review of Necessity (SVN) completed   Respiratory Treatment Status (SVN) given   Treatment Route (SVN) mask   Patient Position (SVN) sitting in chair   Post Treatment Assessment (SVN) increased aeration   Signs of Intolerance (SVN) none   Breath Sounds Post-Respiratory Treatment   Throughout All Fields Post-Treatment All Fields   Throughout All Fields Post-Treatment aeration increased   Post-treatment Heart Rate (beats/min) 89   Post-treatment Resp Rate (breaths/min) 20

## 2022-05-06 NOTE — PROGRESS NOTES
INPATIENT NEPHROLOGY CONSULT   [facility] NEPHROLOGY    Leanna García  05/06/2022    Reason for consultation:    esrd    Chief Complaint:   Chief Complaint   Patient presents with    Fever          History of Present Illness:    Per H and P    Ms. García is a 56-year-old female with a past medical history of ESRD on HD Monday Wednesday Friday, hypertension, IDDM2, PAF, and chronic cough who presents today with complaints of fever up to 103. It is severe.  It is associated with cough, posttussive vomiting, fatigue, sinus congestion, weakness, and a left foot ulcer.  She denies chest pain, diarrhea, dizziness, loss of consciousness.  In the ED she was noted to have a large left foot ulceration at the plantar surface and on the side of the foot.  CT of abdomen pelvis also reveals possible enteritis and bilateral perinephric fat stranding.  WBCs 21 K, troponin is 0.4 in the setting of ESRD.  She did not go to dialysis today she felt too bad.  Glucose is 446, anion gap 20, bicarb 24.     5/5  C/o foot pain.  Mild dyspnea.  No vomiting, chest pain, urinary or bowel complaints.  Weak  5/6  Currently getting wound vac placed.  Has foot pain. No respiratory distress    Plan of Care:       Assessment:    esrd  --continue dialysis per routine  --fluid restrict  --renal dose medication per routine  --continue outpt medication  --continue binders with meals     Anemia  --erythropoiesis stimulating agent with renal replacement therapy    Hypertension  --fluid restrict  --low salt diet  --uf with hd  --continue minoxidil and metoprolol     Hypokalemia  --4 K bath today              Thank you for allowing us to participate in this patient's care. We will continue to follow.    Vital Signs:  Temp Readings from Last 3 Encounters:   05/06/22 97.5 °F (36.4 °C) (Oral)   04/26/22 98.2 °F (36.8 °C)   03/10/22 97.3 °F (36.3 °C) (Tympanic)       Pulse Readings from Last 3 Encounters:   05/06/22 76   04/26/22 66   04/05/22 75       BP  Readings from Last 3 Encounters:   05/06/22 (!) 106/58   04/28/22 (!) 180/82   04/26/22 (!) 174/66       Weight:  Wt Readings from Last 3 Encounters:   05/06/22 87 kg (191 lb 12.8 oz)   04/28/22 94 kg (207 lb 5.5 oz)   04/26/22 93 kg (205 lb)       Past Medical & Surgical History:  Past Medical History:   Diagnosis Date    A-fib     resolved per patient    Arthritis     Bronchitis     Cardiovascular event risk, ASCVD 10-year risk 6.7% 10/15/2016    Diabetes mellitus     Diabetes mellitus type II     Diabetic ulcer of left midfoot 5/5/2022    Disorder of kidney and ureter     Encounter for blood transfusion     ESRD (end stage renal disease) 5/18/2018    MANJINDER (generalized anxiety disorder) 10/25/2016    History of colon polyps 11/02/2016    Hyperlipidemia     Hypertension     Stroke        Past Surgical History:   Procedure Laterality Date    COLONOSCOPY N/A 10/5/2016    Procedure: COLONOSCOPY;  Surgeon: Plilo Chanel MD;  Location: [facility] ENDO;  Service: Endoscopy;  Laterality: N/A;    COLONOSCOPY N/A 4/26/2022    Procedure: COLONOSCOPY;  Surgeon: Saravanan Rachel MD;  Location: [facility] ENDO;  Service: Endoscopy;  Laterality: N/A;    HERNIA REPAIR Bilateral 11/22/2016    inguinal    HYSTERECTOMY      OOPHORECTOMY         Past Social History:  Social History     Socioeconomic History    Marital status:    Tobacco Use    Smoking status: Never Smoker    Smokeless tobacco: Never Used   Substance and Sexual Activity    Alcohol use: No    Drug use: No    Sexual activity: Yes     Partners: Male   Social History Narrative    Caregiver      Social Determinants of Health     Financial Resource Strain: Low Risk     Difficulty of Paying Living Expenses: Not very hard   Food Insecurity: Unknown    Worried About Running Out of Food in the Last Year: Patient refused    Ran Out of Food in the Last Year: Patient refused   Transportation Needs: Unknown    Lack of Transportation (Medical):  Patient refused    Lack of Transportation (Non-Medical): Patient refused   Physical Activity: Insufficiently Active    Days of Exercise per Week: 7 days    Minutes of Exercise per Session: 20 min   Stress: No Stress Concern Present    Feeling of Stress : Only a little   Social Connections: Unknown    Frequency of Communication with Friends and Family: Patient refused    Frequency of Social Gatherings with Friends and Family: Patient refused    Active Member of Clubs or Organizations: No    Marital Status:        Medications:  No current facility-administered medications on file prior to encounter.     Current Outpatient Medications on File Prior to Encounter   Medication Sig Dispense Refill    ascorbic acid, vitamin C, (VITAMIN C) 500 MG tablet Take 500 mg by mouth once daily.      aspirin (ECOTRIN) 81 MG EC tablet Take 81 mg by mouth once daily.      atorvastatin (LIPITOR) 80 MG tablet Take 1 tablet (80 mg total) by mouth once daily. (Patient taking differently: Take 80 mg by mouth every evening.) 90 tablet 3    blood sugar diagnostic Strp To check BG 4 times daily, to use with insurance preferred meter (Patient taking differently: 1 strip by Misc.(Non-Drug; Combo Route) route 4 (four) times daily. To check BG 4 times daily, to use with insurance preferred meter) 450 strip 3    insulin aspart U-100 (NOVOLOG FLEXPEN U-100 INSULIN) 100 unit/mL (3 mL) InPn pen Inject 5-8 units 3 times per day with food. 1 Box 6    insulin detemir U-100 (LEVEMIR FLEXTOUCH U-100 INSULN) 100 unit/mL (3 mL) InPn pen Inject 15-18 Units into the skin once daily. 1 Box 6    lancets Misc To use to check blood sugar 4 times daily. To use with insurance approved meter. Patient needs the round, purple one (Patient taking differently: 1 lancet by Misc.(Non-Drug; Combo Route) route 4 (four) times daily. To use to check blood sugar 4 times daily. To use with insurance approved meter. Patient needs the round, purple one) 450  "each 3    metoprolol tartrate (LOPRESSOR) 25 MG tablet Take 25 mg by mouth 2 (two) times daily.      minoxidiL (LONITEN) 2.5 MG tablet Take 5 mg by mouth 2 (two) times daily.      multivitamin (THERAGRAN) per tablet Take 1 tablet by mouth once daily.      pen needle, diabetic (BD ULTRA-FINE ROBERT PEN NEEDLE) 32 gauge x 5/32" Ndle To use 4 times per day with insulin injections. (Patient taking differently: 1 pen by Misc.(Non-Drug; Combo Route) route 4 (four) times daily. To use 4 times per day with insulin injections.) 450 each 2    sucroferric oxyhydroxide (VELPHORO) 500 mg Chew Take 500 mg by mouth 3 (three) times daily.      dextrose (GLUCOSE GEL) 40 % gel Take 37.5 mLs (15,000 mg total) by mouth once as needed (hypoglycemia). 37.5 g 4    gabapentin (NEURONTIN) 300 MG capsule Take 300 mg by mouth every evening.      glucagon (BAQSIMI) 3 mg/actuation Spry 3 mg (one actuation) into a single nostril; if no response, may repeat in 15 minutes using a new intranasal device. (Patient taking differently: 3 mg by Left Nostril route as needed. 3 mg (one actuation) into a single nostril; if no response, may repeat in 15 minutes using a new intranasal device.) 2 each 1    [DISCONTINUED] blood-glucose meter (ACCU-CHEK KAYLIE PLUS METER) Misc To use to check blood sugars 4 times a day 1 each 0    [DISCONTINUED] clorazepate (TRANXENE) 3.75 MG Tab Take 7.5 mg by mouth nightly as needed.       [DISCONTINUED] fenofibrate 160 MG Tab Take 1 tablet (160 mg total) by mouth once daily. 90 tablet 3    [DISCONTINUED] lancing device with lancets (ACCU-CHEK SOFT DEV LANCETS) Kit To check blood sugars 4 times per day 400 each 3     Scheduled Meds:   albuterol-ipratropium  3 mL Nebulization Q6H    ascorbic acid (vitamin C)  500 mg Oral Daily    aspirin  81 mg Oral Daily    atorvastatin  80 mg Oral QHS    azelastine  1 spray Nasal BID    calcium acetate(phosphat bind)  1,334 mg Oral TID WM    cetirizine  10 mg Oral Every other " "day    collagenase   Topical (Top) Daily    DAPTOmycin (CUBICIN)  IV  500 mg Intravenous Q48H    epoetin chris-epbx  50 Units/kg Intravenous Every Mon, Wed, Fri    fluticasone propionate  2 spray Each Nostril Daily    gabapentin  300 mg Oral QHS    insulin detemir U-100  20 Units Subcutaneous QHS    levoFLOXacin  500 mg Intravenous Q48H    metoprolol tartrate  25 mg Oral BID    minoxidiL  5 mg Oral BID    multivitamin  1 tablet Oral Daily    mupirocin   Nasal BID    piperacillin-tazobactam (ZOSYN) IVPB  3.375 g Intravenous Q12H     Continuous Infusions:  PRN Meds:.acetaminophen, benzonatate, dextrose 50%, dextrose 50%, HYDROcodone-acetaminophen, insulin aspart U-100, melatonin, naloxone, ondansetron, polyethylene glycol, sodium chloride 0.9%    Allergies:  Vancomycin and Chlorhexidine    Past Family History:  Reviewed; refer to Hospitalist Admission Note    Review of Systems:  Review of Systems - All 14 systems reviewed and negative, except as noted in HPI    Physical Exam:    BP (!) 106/58 (BP Location: Right arm, Patient Position: Lying)   Pulse 76   Temp 97.5 °F (36.4 °C) (Oral)   Resp 20   Ht 5' 7" (1.702 m)   Wt 87 kg (191 lb 12.8 oz)   SpO2 95%   BMI 30.04 kg/m²     General Appearance:    Alert, cooperative, no distress, appears stated age   Head:    Normocephalic, without obvious abnormality, atraumatic   Eyes:    PER, conjunctiva/corneas clear, EOM's intact in both eyes        Throat:   Lips, mucosa, and tongue normal; teeth and gums normal   Back:     Symmetric, no curvature, ROM normal, no CVA tenderness   Lungs:     Clear to auscultation bilaterally, respirations unlabored   Chest wall:    No tenderness or deformity   Heart:    Regular rate and rhythm, S1 and S2 normal, no murmur, rub   or gallop   Abdomen:     Soft, non-tender, bowel sounds active all four quadrants,     no masses, no organomegaly   Extremities:   Extremities normal, atraumatic, no cyanosis or edema   Pulses:   2+ and " symmetric all extremities   MSK:   No joint or muscle swelling, tenderness or deformity   Skin:   Skin color, texture, turgor normal, no rashes or lesions   Neurologic:   CNII-XII intact, normal strength and sensation       Throughout.  No flap     Results:  Lab Results   Component Value Date     (L) 05/06/2022    K 4.0 05/06/2022    CL 94 (L) 05/06/2022    CO2 28 05/06/2022    BUN 33 (H) 05/06/2022    CREATININE 7.6 (H) 05/06/2022    CALCIUM 9.2 05/06/2022    ANIONGAP 13 05/06/2022    ESTGFRAFRICA 6.3 (A) 05/06/2022    EGFRNONAA 5.4 (A) 05/06/2022       Lab Results   Component Value Date    CALCIUM 9.2 05/06/2022    PHOS 4.2 03/10/2022       Recent Labs   Lab 05/06/22  0414   WBC 18.00*   RBC 2.81*   HGB 7.8*   HCT 24.6*      MCV 88   MCH 27.8   MCHC 31.7*        Imaging Results          X-Ray Foot Complete Left (Final result)  Result time 05/05/22 06:12:52    Final result by Denise Estrada MD (05/05/22 06:12:52)                 Narrative:    Three views of the left foot are compared to prior study dated 12/3/2021    Clinical history is infection    There is osteopenia. There has been prior amputation of the fourth digit at the base of the proximal phalanx.    There are moderate degenerative changes of the midfoot and hindfoot with joint space narrowing and spurring.. There are no fractures, dislocations or osteolysis. There is a subcutaneous ulceration the mid plantar soft tissues. There is moderate peripheral vascular calcification.    IMPRESSION: Prior amputation of fourth digit without radiographic evidence for osteomyelitis.    Moderate degenerative changes of the foot    16mm soft tissue ulceration in the mid plantar medial soft tissues    Electronically signed by:  Denise Estrada MD  5/5/2022 6:12 AM CDT Workstation: [id number]                             CT Abdomen Pelvis  Without Contrast (Final result)  Result time 05/04/22 21:53:02    Final result by Ernesto Lino MD (05/04/22  21:53:02)                 Narrative:    EXAM: CT ABDOMEN PELVIS WITHOUT CONTRAST    RadLex: CT ABDOMEN PELVIS WITHOUT IV CONTRAST    HISTORY: 56 years  Female;  Abdominal pain, acute, nonlocalized;    TECHNIQUE:   Standard CT of the abdomen and pelvis was performed without the use of intravenous contrast media. Lack of intravenous contrast limits evaluation of soft tissue structures in this study.    CT scans at this facility use dose modulation, iterative reconstruction and/or weight based dosing when appropriate to reduce radiation dose to as low as reasonably achievable.    COMPARISON: None available    FINDING(S):    ABDOMEN:    Lung bases show dependent atelectasis. Coronary artery calcifications are seen. Cardiac size normal. Mild thickening of the distal esophagus with small hiatal hernia.    Small amount of fluid is seen in the stomach. Mild thickening of the stomach lining.    Mild hepatic steatosis. Spleen, pancreas, and both adrenals are normal.    There is gallbladder distention with multiple gallstones.    Neither kidney shows hydronephrosis. There is moderate bilateral perinephric stranding which appears above expected and may represent inflammation. Correlate with urinalysis to exclude the possibility of pyelonephritis.    There are vascular calcifications involving both kidneys. There may be separate small stones as well. The ureters themselves are decompressed.    Marked atherosclerosis affects the aorta and the major branch vessels. No abdominal lymphadenopathy or free abdominal fluid.    The small intestines contain a small to moderate amount of fluid with scattered air-fluid levels. No transition point to suggest obstruction.    No evidence of appendicitis. Oral contrast is seen in the intestines. There is a mild to moderate stool burden. Scattered colonic diverticulosis.    PELVIS:    Bladder is decompressed. No free fluid within the pelvis. There is an enlarged left external iliac lymph node  measuring 1.6 x 0.8 cm on series 3, image 210.    No other groin or pelvis lymphadenopathy. A small left inguinal hernia contains only fat.    Small umbilical hernia contains only fat.    There are degenerative changes in the lower lumbar spine.    IMPRESSION:    1.  Small to moderate amount of fluid in the small intestines with scattered air fluid levels, possibly enteritis  2.  Moderate bilateral perinephric stranding. Correlate with urinalysis to exclude the possibility of pyelonephritis  3.  Gallbladder distention with gallstones    Electronically signed by:  Ernesto Lino MD  5/4/2022 9:53 PM CDT Workstation: [id number]                             X-Ray Chest 1 View (Final result)  Result time 05/05/22 06:10:56    Final result by Denise Estrada MD (05/05/22 06:10:56)                 Narrative:    XR CHEST 1 VIEW    CLINICAL HISTORY:  56 years Female pain    COMPARISON: 5/19/2021    FINDINGS: Cardiomediastinal silhouette is within normal limits. Lungs are normally expanded with no airspace consolidation. No pleural effusion or pneumothorax. No acute osseous abnormality.    IMPRESSION: No acute pulmonary process.    Electronically signed by:  Denise Estrada MD  5/5/2022 6:10 AM CDT Workstation: [id number]                                I have personally reviewed pertinent radiological imaging and reports.     Patient care was time spent personally by me on the following activities:   · Obtaining a history  · Examination of patient.  · Providing medical care at the patients bedside.  · Developing a treatment plan with patient or surrogate and bedside caregivers  · Ordering and reviewing laboratory studies, radiographic studies, pulse oximetry.  · Ordering and performing treatments and interventions.  · Evaluation of patient's response to treatment.  · Discussions with consultants while on the unit and immediately available to the patient.  · Re-evaluation of the patient's condition.  · Documentation in  the medical record.     Salvador Carlson MD  Nephrology  [facility]  [phone]

## 2022-05-06 NOTE — ASSESSMENT & PLAN NOTE
Bedside debridement and incision and drainage below fascia with copious amounts of purulent drainage evacuated out of the foot.  Please see procedure note.     Wound VAC changes twice weekly to both wounds on left foot.    Indium-labeled WBC scan shows foot as source of infection    Wilman ordered for nutrition for wound healing- patient states she is not drinking due to not liking the taste.  Following me explaining the importance of the drink for wound healing she states she will begin drinking it    Nonweightbearing to left foot     Counseled patient on increasing protein intake, not getting wound wet, keeping dressing clean dry and intact, following a healthy diet, removing pressure from wound      Recommend LTAC following discharge for IV antibiotics and wound care with wound VAC

## 2022-05-06 NOTE — PLAN OF CARE
Problem: Adult Inpatient Plan of Care  Goal: Plan of Care Review  Outcome: Ongoing, Progressing  Goal: Patient-Specific Goal (Individualized)  Outcome: Ongoing, Progressing  Goal: Absence of Hospital-Acquired Illness or Injury  Outcome: Ongoing, Progressing  Goal: Optimal Comfort and Wellbeing  Outcome: Ongoing, Progressing  Goal: Readiness for Transition of Care  Outcome: Ongoing, Progressing     Problem: Diabetes Comorbidity  Goal: Blood Glucose Level Within Targeted Range  Outcome: Ongoing, Progressing     Problem: Adjustment to Illness (Sepsis/Septic Shock)  Goal: Optimal Coping  Outcome: Ongoing, Progressing     Problem: Bleeding (Sepsis/Septic Shock)  Goal: Absence of Bleeding  Outcome: Ongoing, Progressing     Problem: Glycemic Control Impaired (Sepsis/Septic Shock)  Goal: Blood Glucose Level Within Desired Range  Outcome: Ongoing, Progressing     Problem: Infection Progression (Sepsis/Septic Shock)  Goal: Absence of Infection Signs and Symptoms  Outcome: Ongoing, Progressing     Problem: Nutrition Impaired (Sepsis/Septic Shock)  Goal: Optimal Nutrition Intake  Outcome: Ongoing, Progressing     Problem: Fall Injury Risk  Goal: Absence of Fall and Fall-Related Injury  Outcome: Ongoing, Progressing     Problem: Skin Injury Risk Increased  Goal: Skin Health and Integrity  Outcome: Ongoing, Progressing     Problem: Device-Related Complication Risk (Hemodialysis)  Goal: Safe, Effective Therapy Delivery  Outcome: Ongoing, Progressing     Problem: Hemodynamic Instability (Hemodialysis)  Goal: Effective Tissue Perfusion  Outcome: Ongoing, Progressing     Problem: Infection (Hemodialysis)  Goal: Absence of Infection Signs and Symptoms  Outcome: Ongoing, Progressing     Problem: Impaired Wound Healing  Goal: Optimal Wound Healing  Outcome: Ongoing, Progressing

## 2022-05-06 NOTE — PROGRESS NOTES
"[facility]  Adult Nutrition   Progress Note (Initial Assessment)     SUMMARY     Recommendations/Interventions:    Recommendation/Intervention: 1. Continue current diet as tolerated, encourage intake. 2. Added Wilman BID (180 kcal, 5g protein, 14g Arginine, 19 mg Zinc, 600 mg Vitamin C, 30 mg Vitamin E daily) 3.  to assist in meal choices daily.  Goals: 1. Patient to meet at least 75% of estimated needs through meal intake.  Nutrition Goal Status: new    Dietitian Rounds Brief:  · Swallowing issues. SLP assessed patient yesterday- Recommended easy to chew.  Appetite and intake are good-consuming % of meals. Wound on left foot-added wilman. Just took a pain pill so she is very sleepy. Will continue to monitor intake, labs, and plan of care.  Reason for Assessment  Reason For Assessment: length of stay  Diagnosis:  (bacteremia)  Relevant Medical History: ESRD- on HD, T2DM, HTN, CHF, MANJINDER, iron deficiency anemia, TIA  Interdisciplinary Rounds: did not attend    Nutrition Risk Screen  Nutrition Risk Screen: dysphagia or difficulty swallowing     MST Score: 0  Have you recently lost weight without trying?: No  Weight loss score: 0  Have you been eating poorly because of a decreased appetite?: No  Appetite score: 0     Nutrition/Diet History  Food Allergies: NKFA  Factors Affecting Nutritional Intake: difficulty/impaired swallowing    Anthropometrics  Temp: 97.5 °F (36.4 °C)  Height Method: Stated  Height: 5' 7" (170.2 cm)  Height (inches): 67 in  Weight Method: Standard Scale  Weight: 87 kg (191 lb 12.8 oz)  Weight (lb): 191.8 lb  Ideal Body Weight (IBW), Female: 135 lb  % Ideal Body Weight, Female (lb): 147.56 %  BMI (Calculated): 30  BMI Grade: 30 - 34.9- obesity - grade I     Weight History:  Wt Readings from Last 10 Encounters:   05/06/22 87 kg (191 lb 12.8 oz)   04/28/22 94 kg (207 lb 5.5 oz)   04/26/22 93 kg (205 lb)   04/05/22 94.3 kg (208 lb)   04/05/22 93 kg (205 lb 0.4 oz)   04/05/22 " 94.3 kg (208 lb)   04/05/22 94.3 kg (208 lb)   03/10/22 94.7 kg (208 lb 12.4 oz)   12/14/21 96.6 kg (213 lb)   12/03/21 97 kg (213 lb 13.5 oz)     Lab/Procedures/Meds: Pertinent Labs Reviewed  Clinical Chemistry:  Recent Labs   Lab 05/04/22 2040 05/05/22 0320 05/06/22  0414   * 131* 135*   K 3.2* 3.4* 4.0   CL 84* 84* 94*   CO2 24 25 28   * 321* 171*   BUN 51* 51* 33*   CREATININE 9.9* 11.0* 7.6*   CALCIUM 8.8 9.1 9.2   PROT 8.0  --   --    ALBUMIN 3.5  --   --    BILITOT 0.9  --   --    ALKPHOS 60  --   --    AST 13  --   --    ALT 11  --   --    ANIONGAP 20* 22* 13   ESTGFRAFRICA 4.6* 4.0* 6.3*   EGFRNONAA 3.9* 3.5* 5.4*   MG 2.0 2.0  --    LIPASE 36  --   --      CBC:   Recent Labs   Lab 05/06/22 0414   WBC 18.00*   RBC 2.81*   HGB 7.8*   HCT 24.6*      MCV 88   MCH 27.8   MCHC 31.7*     Cardiac Profile:  Recent Labs   Lab 05/04/22 2040 05/05/22 0320   *  --    TROPONINI 0.403* 0.343*     Inflammatory Labs:  Recent Labs   Lab 05/04/22 2040   CRP 32.78*     Diabetes:  Recent Labs   Lab 05/05/22  0320   HGBA1C 10.6*     Thyroid & Parathyroid:  Recent Labs   Lab 05/04/22 2040   TSH 1.080     Medications: Pertinent Medications reviewed  Scheduled Meds:   albuterol-ipratropium  3 mL Nebulization Q6H    ascorbic acid (vitamin C)  500 mg Oral Daily    aspirin  81 mg Oral Daily    atorvastatin  80 mg Oral QHS    azelastine  1 spray Nasal BID    calcium acetate(phosphat bind)  1,334 mg Oral TID WM    cetirizine  10 mg Oral Every other day    collagenase   Topical (Top) Daily    epoetin chris-epbx  50 Units/kg Intravenous Every Mon, Wed, Fri    fluticasone propionate  2 spray Each Nostril Daily    gabapentin  300 mg Oral QHS    insulin detemir U-100  20 Units Subcutaneous QHS    levoFLOXacin  500 mg Intravenous Q48H    metoprolol tartrate  25 mg Oral BID    minoxidiL  5 mg Oral BID    multivitamin  1 tablet Oral Daily    mupirocin   Nasal BID    piperacillin-tazobactam  "(ZOSYN) IVPB  3.375 g Intravenous Q12H     Continuous Infusions:  PRN Meds:.acetaminophen, benzonatate, dextrose 50%, dextrose 50%, HYDROcodone-acetaminophen, insulin aspart U-100, melatonin, naloxone, ondansetron, polyethylene glycol, sodium chloride 0.9%    Antibiotics (From admission, onward)            Start     Stop Route Frequency Ordered    05/05/22 1615  levoFLOXacin 500 mg/100 mL IVPB 500 mg        Note to Pharmacy: Ht: 5' 7" (1.702 m)  Wt: 90.4 kg (199 lb 3.2 oz)  Estimated Creatinine Clearance: 6.6 mL/min (A) (based on SCr of 11 mg/dL (H)).  Body mass index is 31.2 kg/m².    -- IV Every 48 hours (non-standard times) 05/05/22 1509    05/05/22 1045  mupirocin 2 % ointment         05/10 0859 Nasl 2 times daily 05/05/22 0933    05/05/22 0045  piperacillin-tazobactam 3.375 g in dextrose 5 % 50 mL IVPB (ready to mix system)        Note to Pharmacy: Ht: 5' 7" (1.702 m)  Wt: 81.2 kg (179 lb)  Estimated Creatinine Clearance: 7 mL/min (A) (based on SCr of 9.9 mg/dL (H)).  Body mass index is 28.04 kg/m².    -- IV Every 12 hours (non-standard times) 05/04/22 2345           Estimated/Assessed Needs  Weight Used For Calorie Calculations: 87 kg (191 lb 12.8 oz)  Energy Calorie Requirements (kcal): 4841-9446 kcals/day (20-25 kcals/kg)  Energy Need Method: Kcal/kg  Protein Requirements:  g/day (1.5-2.0 g/kg IBW 2' ESRD-on HD)  Weight Used For Protein Calculations: 61.2 kg (135 lb)  Fluid Requirements (mL): UOP +  1000 mL or per MD     RDA Method (mL): 1740       Nutrition Prescription Ordered    Current Diet Order: Renal/Easy To Chew (Level 7)/ Diabetic    Evaluation of Received Nutrient/Fluid Intake    Energy Calories Required: meeting needs  Protein Required: meeting needs  Fluid Required: meeting needs  Tolerance: tolerating  % Intake of Estimated Energy Needs: 75 - 100 %  % Meal Intake: 75 - 100 %    Intake/Output Summary (Last 24 hours) at 5/6/2022 9614  Last data filed at 5/6/2022 0355  Gross per 24 hour "   Intake 1100 ml   Output 2620 ml   Net -1520 ml      Nutrition Risk    Level of Risk/Frequency of Follow-up: moderate   Monitor and Evaluation    Food and Nutrient Intake: energy intake, food and beverage intake  Food and Nutrient Adminstration: diet order  Physical Activity and Function: nutrition-related ADLs and IADLs, factors affecting access to physical activity  Anthropometric Measurements: weight, weight change, body mass index  Biochemical Data, Medical Tests and Procedures: electrolyte and renal panel, gastrointestinal profile, glucose/endocrine profile, inflammatory profile, lipid profile  Nutrition-Focused Physical Findings: overall appearance     Nutrition Follow-Up    RD Follow-up?: Yes  Ynay Torres RD 05/06/2022 10:16 AM

## 2022-05-06 NOTE — SUBJECTIVE & OBJECTIVE
Interval History:     Review of Systems   Constitutional:  Negative for activity change and appetite change.   HENT:  Negative for congestion and dental problem.    Eyes:  Negative for discharge and itching.   Respiratory:  Negative for shortness of breath.    Cardiovascular:  Negative for chest pain.   Gastrointestinal:  Negative for abdominal distention and abdominal pain.   Endocrine: Negative for cold intolerance.   Genitourinary:  Negative for difficulty urinating and dysuria.   Musculoskeletal:  Negative for arthralgias and back pain.   Skin:  Positive for wound. Negative for color change.   Neurological:  Negative for dizziness and facial asymmetry.   Hematological:  Negative for adenopathy.   Psychiatric/Behavioral:  Negative for agitation and behavioral problems.    Objective:     Vital Signs (Most Recent):  Temp: 97 °F (36.1 °C) (05/05/22 1705)  Pulse: 86 (05/05/22 1705)  Resp: 19 (05/05/22 1734)  BP: (!) 130/55 (05/05/22 1705)  SpO2: 97 % (05/05/22 1705)   Vital Signs (24h Range):  Temp:  [97 °F (36.1 °C)-102.2 °F (39 °C)] 97 °F (36.1 °C)  Pulse:  [64-92] 86  Resp:  [16-28] 19  SpO2:  [88 %-100 %] 97 %  BP: (108-181)/(50-75) 130/55     Weight: 90.4 kg (199 lb 3.2 oz)  Body mass index is 31.2 kg/m².    Intake/Output Summary (Last 24 hours) at 5/5/2022 1920  Last data filed at 5/5/2022 1705  Gross per 24 hour   Intake 1100 ml   Output 2500 ml   Net -1400 ml      Physical Exam  Vitals and nursing note reviewed.   Constitutional:       General: She is not in acute distress.  HENT:      Head: Atraumatic.      Right Ear: External ear normal.      Left Ear: External ear normal.      Nose: Nose normal.      Mouth/Throat:      Mouth: Mucous membranes are moist.   Eyes:      General: No scleral icterus.  Cardiovascular:      Rate and Rhythm: Normal rate.   Pulmonary:      Effort: Pulmonary effort is normal.   Abdominal:      General: There is no distension.   Musculoskeletal:         General: Normal range of  motion.      Cervical back: Normal range of motion.   Skin:     General: Skin is warm.      Comments: L foot ulcer   Neurological:      Mental Status: She is alert and oriented to person, place, and time.       Significant Labs: All pertinent labs within the past 24 hours have been reviewed.  CBC:   Recent Labs   Lab 05/04/22 2040 05/05/22  0320   WBC 21.06* 18.99*   HGB 9.2* 9.0*   HCT 28.5* 27.4*    276     CMP:   Recent Labs   Lab 05/04/22 2040 05/05/22  0320   * 131*   K 3.2* 3.4*   CL 84* 84*   CO2 24 25   * 321*   BUN 51* 51*   CREATININE 9.9* 11.0*   CALCIUM 8.8 9.1   PROT 8.0  --    ALBUMIN 3.5  --    BILITOT 0.9  --    ALKPHOS 60  --    AST 13  --    ALT 11  --    ANIONGAP 20* 22*   EGFRNONAA 3.9* 3.5*       Significant Imaging: I have reviewed all pertinent imaging results/findings within the past 24 hours.

## 2022-05-06 NOTE — PROCEDURES
"Leanna García is a 56 y.o. female patient.    Temp: 97.5 °F (36.4 °C) (05/06/22 0729)  Pulse: 76 (05/06/22 0856)  Resp: 20 (05/06/22 0856)  BP: (!) 106/58 (05/06/22 0729)  SpO2: 95 % (05/06/22 0729)  Weight: 87 kg (191 lb 12.8 oz) (05/06/22 0351)  Height: 5' 7" (170.2 cm) (05/05/22 0306)       Debridement    Date/Time: 5/6/2022 9:30 AM  Performed by: Alivia Bruno DPM  Authorized by: Alivia Bruno DPM     Time out: Immediately prior to procedure a "time out" was called to verify the correct patient, procedure, equipment, support staff and site/side marked as required.    Consent Done?:  Yes (Verbal)    Preparation: Patient was prepped and draped in usual sterile fashion    Local anesthesia used?: No      Wound Details:    Location:  Left foot    Location:  Left Plantar    Type of Debridement:  Excisional       Length (cm):  2.8       Area (sq cm):  7.28       Width (cm):  2.6       Percent Debrided (%):  100       Depth (cm):  1       Total Area Debrided (sq cm):  7.28    Depth of debridement:  Muscle/fascia/tendon    Tissue debrided:  Ligament, Fascia, Subcutaneous and Tendon    Devitalized tissue debrided:  Biofilm, Callus, Exudate, Slough, Necrotic/Eschar and Fibrin    Instruments:  Forceps, Blade, Curette and Nippers    Additional wounds:  1    2nd Wound Details:     Location:  Left foot    Location:  Left Dorsal Foot    Location:  Left Dorsal Foot    Type of Debridement:  Excisional       Length (cm):  3       Area (sq cm):  5.7       Width (cm):  1.9       Percent Debrided (%):  100       Depth (cm):  0.3       Total Area Debrided (sq cm):  5.7    Depth of debridement:  Subcutaneous tissue    Tissue debrided:  Subcutaneous    Devitalized tissue debrided:  Biofilm, Callus, Slough, Necrotic/Eschar, Fibrin and Exudate    Instruments:  Blade, Curette, Forceps and Nippers    Bleeding:  Moderate  Hemostasis Achieved: Yes    Method Used:  Pressure  Patient tolerance:  Patient tolerated the procedure well with " no immediate complications        5/6/2022

## 2022-05-06 NOTE — NURSING
Pt has a high temp 103.1; tylenol was given and cold compress to control the high temp. Charge nurse Christina was called at the bedside because pt throw up right after medication was administered. Pt stated that she didn't feel good she is going to pass out.  Zofran was given to control the vomiting and charge nurse Christina messaged Dr. Lee. MD ordered to give more tylenol after giving zofran.

## 2022-05-06 NOTE — SUBJECTIVE & OBJECTIVE
Subjective:     Interval History:  Patient resting in bed.  Denies pain to either extremity.  Discussed with patient bedside procedure for debridement and VAC placement.        Scheduled Meds:   albuterol-ipratropium  3 mL Nebulization Q6H    ascorbic acid (vitamin C)  500 mg Oral Daily    aspirin  81 mg Oral Daily    atorvastatin  80 mg Oral QHS    azelastine  1 spray Nasal BID    calcium acetate(phosphat bind)  1,334 mg Oral TID WM    cetirizine  10 mg Oral Every other day    collagenase   Topical (Top) Daily    DAPTOmycin (CUBICIN)  IV  500 mg Intravenous Q48H    epoetin chris-epbx  50 Units/kg Intravenous Every Mon, Wed, Fri    fluticasone propionate  2 spray Each Nostril Daily    gabapentin  300 mg Oral QHS    insulin detemir U-100  20 Units Subcutaneous QHS    levoFLOXacin  500 mg Intravenous Q48H    metoprolol tartrate  25 mg Oral BID    minoxidiL  5 mg Oral BID    multivitamin  1 tablet Oral Daily    mupirocin   Nasal BID    piperacillin-tazobactam (ZOSYN) IVPB  3.375 g Intravenous Q12H     Continuous Infusions:  PRN Meds:acetaminophen, benzonatate, dextrose 50%, dextrose 50%, HYDROcodone-acetaminophen, insulin aspart U-100, melatonin, naloxone, ondansetron, polyethylene glycol, sodium chloride 0.9%    Review of Systems  Objective:     Vital Signs (Most Recent):  Temp: 97.5 °F (36.4 °C) (05/06/22 0729)  Pulse: 76 (05/06/22 0856)  Resp: 20 (05/06/22 0856)  BP: (!) 106/58 (05/06/22 0729)  SpO2: 95 % (05/06/22 0729)   Vital Signs (24h Range):  Temp:  [97 °F (36.1 °C)-103.1 °F (39.5 °C)] 97.5 °F (36.4 °C)  Pulse:  [60-92] 76  Resp:  [16-20] 20  SpO2:  [95 %-98 %] 95 %  BP: ()/(50-65) 106/58     Weight: 87 kg (191 lb 12.8 oz)  Body mass index is 30.04 kg/m².    Foot Exam    Post debridement pictures below.  necrotic nonviable tissue and necrotic tendon and ligament present was debrided.  Did not encounter any abscesses during debridement.  However 1 area does probe to bone.           Pre debridement  pictures below              Laboratory:  All pertinent labs reviewed within the last 24 hours.    Diagnostic Results:  I have reviewed all pertinent imaging results/findings within the past 24 hours.

## 2022-05-06 NOTE — HOSPITAL COURSE
Patient  with Uncontrolled Diabetes mellitus and ESRD admitted with sepsis   Bacillus cereus bacteremia along with L foot abscess   Had enteritis/Food poisoning (ate chinese food before onset of symptoms)  Regarding Foot abscess pt had S/p Incision/drainage/debridement of  Left foot deep tissue abscess below fascia muscle and tendon  She Again had Incision and drainage below fascia of multiple abscesses left foot on 05/13  Patient was evaluated by ID/Podiatry Teams   Eventually pt was ischarged to SNF for 5 weeks of iv Daptomycin Rx  Long term Prognosis of R foot remains very poor

## 2022-05-06 NOTE — PLAN OF CARE
Problem: Impaired Wound Healing  Goal: Optimal Wound Healing  Outcome: Ongoing, Progressing  Intervention: Promote Wound Healing  Flowsheets (Taken 5/6/2022 1018)  Oral Nutrition Promotion: (vicky) --

## 2022-05-06 NOTE — CONSULTS
Discussed surgical shoe with RN, who will obtain through central supply.    Attending MD notified of need for referral to Dr. Bruno, and ambulatory referral to wound care requested.

## 2022-05-06 NOTE — ASSESSMENT & PLAN NOTE
Bedside debridement performed.  See procedure note.    Wound VAC changes twice weekly to both wounds on left foot.    Indium-labeled WBC scan ordered.  MRI was negative, but due to kidney issues MRI was not performed with contrast.  Therefore additional imaging necessary to rule out deeper infection in foot    Wilman ordered for nutrition for wound healing    Nonweightbearing to left foot     Counseled patient on increasing protein intake, not getting wound wet, keeping dressing clean dry and intact, following a healthy diet, removing pressure from wound

## 2022-05-06 NOTE — PLAN OF CARE
Important Message from Medicare was sign, explained and given to patient/caregiver on 05/06/2022 at 11:07am     addressed any questions or concerns.    Important Message from Medicare document will be scanned into patient's medical record

## 2022-05-06 NOTE — PROGRESS NOTES
[facility] Medicine  Progress Note    Patient Name: Leanna García  MRN: [id number]  Patient Class: IP- Inpatient   Admission Date: 5/4/2022  Length of Stay: 1 days  Attending Physician: Usman Stacy MD  Primary Care Provider: Stefano Lopez MD        Subjective:     Principal Problem:Bacteremia        HPI:  Ms. García is a 56-year-old female with a past medical history of ESRD on HD Monday Wednesday Friday, hypertension, IDDM2, PAF, and chronic cough who presents today with complaints of fever up to 103. It is severe.  It is associated with cough, posttussive vomiting, fatigue, sinus congestion, weakness, and a left foot ulcer.  She denies chest pain, diarrhea, dizziness, loss of consciousness.  In the ED she was noted to have a large left foot ulceration at the plantar surface and on the side of the foot.  CT of abdomen pelvis also reveals possible enteritis and bilateral perinephric fat stranding.  WBCs 21 K, troponin is 0.4 in the setting of ESRD.  She did not go to dialysis today she felt too bad.  Glucose is 446, anion gap 20, bicarb 24.       Overview/Hospital Course:  05/05  Had HD today  MRI r/o osteomyelitis  Afebrile now      Interval History:     Review of Systems   Constitutional:  Negative for activity change and appetite change.   HENT:  Negative for congestion and dental problem.    Eyes:  Negative for discharge and itching.   Respiratory:  Negative for shortness of breath.    Cardiovascular:  Negative for chest pain.   Gastrointestinal:  Negative for abdominal distention and abdominal pain.   Endocrine: Negative for cold intolerance.   Genitourinary:  Negative for difficulty urinating and dysuria.   Musculoskeletal:  Negative for arthralgias and back pain.   Skin:  Positive for wound. Negative for color change.   Neurological:  Negative for dizziness and facial asymmetry.   Hematological:  Negative for adenopathy.   Psychiatric/Behavioral:  Negative for agitation and  behavioral problems.    Objective:     Vital Signs (Most Recent):  Temp: 97 °F (36.1 °C) (05/05/22 1705)  Pulse: 86 (05/05/22 1705)  Resp: 19 (05/05/22 1734)  BP: (!) 130/55 (05/05/22 1705)  SpO2: 97 % (05/05/22 1705)   Vital Signs (24h Range):  Temp:  [97 °F (36.1 °C)-102.2 °F (39 °C)] 97 °F (36.1 °C)  Pulse:  [64-92] 86  Resp:  [16-28] 19  SpO2:  [88 %-100 %] 97 %  BP: (108-181)/(50-75) 130/55     Weight: 90.4 kg (199 lb 3.2 oz)  Body mass index is 31.2 kg/m².    Intake/Output Summary (Last 24 hours) at 5/5/2022 1920  Last data filed at 5/5/2022 1705  Gross per 24 hour   Intake 1100 ml   Output 2500 ml   Net -1400 ml      Physical Exam  Vitals and nursing note reviewed.   Constitutional:       General: She is not in acute distress.  HENT:      Head: Atraumatic.      Right Ear: External ear normal.      Left Ear: External ear normal.      Nose: Nose normal.      Mouth/Throat:      Mouth: Mucous membranes are moist.   Eyes:      General: No scleral icterus.  Cardiovascular:      Rate and Rhythm: Normal rate.   Pulmonary:      Effort: Pulmonary effort is normal.   Abdominal:      General: There is no distension.   Musculoskeletal:         General: Normal range of motion.      Cervical back: Normal range of motion.   Skin:     General: Skin is warm.      Comments: L foot ulcer   Neurological:      Mental Status: She is alert and oriented to person, place, and time.       Significant Labs: All pertinent labs within the past 24 hours have been reviewed.  CBC:   Recent Labs   Lab 05/04/22 2040 05/05/22  0320   WBC 21.06* 18.99*   HGB 9.2* 9.0*   HCT 28.5* 27.4*    276     CMP:   Recent Labs   Lab 05/04/22 2040 05/05/22  0320   * 131*   K 3.2* 3.4*   CL 84* 84*   CO2 24 25   * 321*   BUN 51* 51*   CREATININE 9.9* 11.0*   CALCIUM 8.8 9.1   PROT 8.0  --    ALBUMIN 3.5  --    BILITOT 0.9  --    ALKPHOS 60  --    AST 13  --    ALT 11  --    ANIONGAP 20* 22*   EGFRNONAA 3.9* 3.5*       Significant  Imaging: I have reviewed all pertinent imaging results/findings within the past 24 hours.      Assessment/Plan:      * Bacteremia  Gram positive rods on BC X 1  Had enteritis/Food poisoning which has been resolved      Cough  Symptomatic management      Acute bronchitis  Symptomatic management      Elevated troponin  Likely demand ischemia in the setting of ESRD        Hypokalemia  Stable     Enteritis  Resolved     Diabetic ulcer of left midfoot  Wound care  Need Follow up with Podiatry MD on outpatient basis  MRI r/o osteomyelitis    Type 2 diabetes mellitus with kidney complication, with long-term current use of insulin  Maintain present regime       ESRD (end stage renal disease)  HD sessions as scheduled         VTE Risk Mitigation (From admission, onward)         Ordered     IP VTE HIGH RISK PATIENT  Once         05/05/22 0003     Place sequential compression device  Until discontinued         05/05/22 0003                Discharge Planning   ERI: 5/8/2022     Code Status: Full Code   Is the patient medically ready for discharge?: No    Reason for patient still in hospital (select all that apply): Treatment  Discharge Plan A: Home with family                  Usman Stacy MD  Department of Hospital Medicine   [facility]

## 2022-05-07 LAB
ANION GAP SERPL CALC-SCNC: 14 MMOL/L (ref 8–16)
BACTERIA ISLT: NORMAL
BASOPHILS # BLD AUTO: 0.05 K/UL (ref 0–0.2)
BASOPHILS NFR BLD: 0.3 % (ref 0–1.9)
BUN SERPL-MCNC: 44 MG/DL (ref 6–20)
CALCIUM SERPL-MCNC: 9.1 MG/DL (ref 8.7–10.5)
CHLORIDE SERPL-SCNC: 98 MMOL/L (ref 95–110)
CO2 SERPL-SCNC: 22 MMOL/L (ref 23–29)
CREAT SERPL-MCNC: 7.3 MG/DL (ref 0.5–1.4)
DIFFERENTIAL METHOD: ABNORMAL
EOSINOPHIL # BLD AUTO: 0 K/UL (ref 0–0.5)
EOSINOPHIL NFR BLD: 0.2 % (ref 0–8)
ERYTHROCYTE [DISTWIDTH] IN BLOOD BY AUTOMATED COUNT: 14.8 % (ref 11.5–14.5)
EST. GFR  (AFRICAN AMERICAN): 6.6 ML/MIN/1.73 M^2
EST. GFR  (NON AFRICAN AMERICAN): 5.7 ML/MIN/1.73 M^2
GLUCOSE SERPL-MCNC: 139 MG/DL (ref 70–110)
GLUCOSE SERPL-MCNC: 153 MG/DL (ref 70–110)
GLUCOSE SERPL-MCNC: 157 MG/DL (ref 70–110)
GLUCOSE SERPL-MCNC: 176 MG/DL (ref 70–110)
GLUCOSE SERPL-MCNC: 219 MG/DL (ref 70–110)
HCT VFR BLD AUTO: 24.3 % (ref 37–48.5)
HGB BLD-MCNC: 7.3 G/DL (ref 12–16)
IMM GRANULOCYTES # BLD AUTO: 0.18 K/UL (ref 0–0.04)
IMM GRANULOCYTES NFR BLD AUTO: 1.2 % (ref 0–0.5)
LYMPHOCYTES # BLD AUTO: 1.9 K/UL (ref 1–4.8)
LYMPHOCYTES NFR BLD: 12.6 % (ref 18–48)
MCH RBC QN AUTO: 27.1 PG (ref 27–31)
MCHC RBC AUTO-ENTMCNC: 30 G/DL (ref 32–36)
MCV RBC AUTO: 90 FL (ref 82–98)
MONOCYTES # BLD AUTO: 1.7 K/UL (ref 0.3–1)
MONOCYTES NFR BLD: 10.9 % (ref 4–15)
NEUTROPHILS # BLD AUTO: 11.3 K/UL (ref 1.8–7.7)
NEUTROPHILS NFR BLD: 74.8 % (ref 38–73)
NRBC BLD-RTO: 0 /100 WBC
PLATELET # BLD AUTO: 319 K/UL (ref 150–450)
PMV BLD AUTO: 10.9 FL (ref 9.2–12.9)
POTASSIUM SERPL-SCNC: 4.5 MMOL/L (ref 3.5–5.1)
RBC # BLD AUTO: 2.69 M/UL (ref 4–5.4)
SODIUM SERPL-SCNC: 134 MMOL/L (ref 136–145)
WBC # BLD AUTO: 15.09 K/UL (ref 3.9–12.7)

## 2022-05-07 PROCEDURE — C9399 UNCLASSIFIED DRUGS OR BIOLOG: HCPCS | Performed by: NURSE PRACTITIONER

## 2022-05-07 PROCEDURE — 25000003 PHARM REV CODE 250: Performed by: NURSE PRACTITIONER

## 2022-05-07 PROCEDURE — 97530 THERAPEUTIC ACTIVITIES: CPT

## 2022-05-07 PROCEDURE — 99232 PR SUBSEQUENT HOSPITAL CARE,LEVL II: ICD-10-PCS | Mod: ,,, | Performed by: STUDENT IN AN ORGANIZED HEALTH CARE EDUCATION/TRAINING PROGRAM

## 2022-05-07 PROCEDURE — 99232 SBSQ HOSP IP/OBS MODERATE 35: CPT | Mod: ,,, | Performed by: STUDENT IN AN ORGANIZED HEALTH CARE EDUCATION/TRAINING PROGRAM

## 2022-05-07 PROCEDURE — 21400001 HC TELEMETRY ROOM

## 2022-05-07 PROCEDURE — 27000221 HC OXYGEN, UP TO 24 HOURS

## 2022-05-07 PROCEDURE — 36415 COLL VENOUS BLD VENIPUNCTURE: CPT | Performed by: NURSE PRACTITIONER

## 2022-05-07 PROCEDURE — 63600175 PHARM REV CODE 636 W HCPCS: Performed by: NURSE PRACTITIONER

## 2022-05-07 PROCEDURE — 85025 COMPLETE CBC W/AUTO DIFF WBC: CPT | Performed by: NURSE PRACTITIONER

## 2022-05-07 PROCEDURE — 80048 BASIC METABOLIC PNL TOTAL CA: CPT | Performed by: NURSE PRACTITIONER

## 2022-05-07 PROCEDURE — 99900035 HC TECH TIME PER 15 MIN (STAT)

## 2022-05-07 PROCEDURE — 25000003 PHARM REV CODE 250: Performed by: INTERNAL MEDICINE

## 2022-05-07 PROCEDURE — 97162 PT EVAL MOD COMPLEX 30 MIN: CPT

## 2022-05-07 PROCEDURE — 94761 N-INVAS EAR/PLS OXIMETRY MLT: CPT

## 2022-05-07 RX ADMIN — PIPERACILLIN AND TAZOBACTAM 3.38 G: 3; .375 INJECTION, POWDER, LYOPHILIZED, FOR SOLUTION INTRAVENOUS; PARENTERAL at 12:05

## 2022-05-07 RX ADMIN — AZELASTINE HYDROCHLORIDE 137 MCG: 137 SPRAY, METERED NASAL at 09:05

## 2022-05-07 RX ADMIN — ACETAMINOPHEN 650 MG: 325 TABLET, FILM COATED ORAL at 09:05

## 2022-05-07 RX ADMIN — METOPROLOL TARTRATE 25 MG: 25 TABLET, FILM COATED ORAL at 09:05

## 2022-05-07 RX ADMIN — METOPROLOL TARTRATE 25 MG: 25 TABLET, FILM COATED ORAL at 08:05

## 2022-05-07 RX ADMIN — CALCIUM ACETATE 1334 MG: 667 CAPSULE ORAL at 12:05

## 2022-05-07 RX ADMIN — INSULIN ASPART 1 UNITS: 100 INJECTION, SOLUTION INTRAVENOUS; SUBCUTANEOUS at 12:05

## 2022-05-07 RX ADMIN — MUPIROCIN: 20 OINTMENT TOPICAL at 09:05

## 2022-05-07 RX ADMIN — OXYCODONE HYDROCHLORIDE AND ACETAMINOPHEN 500 MG: 500 TABLET ORAL at 08:05

## 2022-05-07 RX ADMIN — INSULIN DETEMIR 20 UNITS: 100 INJECTION, SOLUTION SUBCUTANEOUS at 09:05

## 2022-05-07 RX ADMIN — CALCIUM ACETATE 1334 MG: 667 CAPSULE ORAL at 08:05

## 2022-05-07 RX ADMIN — ATORVASTATIN CALCIUM 80 MG: 40 TABLET, FILM COATED ORAL at 09:05

## 2022-05-07 RX ADMIN — FLUTICASONE PROPIONATE 100 MCG: 50 SPRAY, METERED NASAL at 08:05

## 2022-05-07 RX ADMIN — BENZONATATE 100 MG: 100 CAPSULE ORAL at 08:05

## 2022-05-07 RX ADMIN — CETIRIZINE HYDROCHLORIDE 10 MG: 10 TABLET, FILM COATED ORAL at 08:05

## 2022-05-07 RX ADMIN — MUPIROCIN: 20 OINTMENT TOPICAL at 08:05

## 2022-05-07 RX ADMIN — ACETAMINOPHEN 650 MG: 325 TABLET, FILM COATED ORAL at 08:05

## 2022-05-07 RX ADMIN — INSULIN ASPART 2 UNITS: 100 INJECTION, SOLUTION INTRAVENOUS; SUBCUTANEOUS at 05:05

## 2022-05-07 RX ADMIN — AZELASTINE HYDROCHLORIDE 137 MCG: 137 SPRAY, METERED NASAL at 08:05

## 2022-05-07 RX ADMIN — INSULIN ASPART 1 UNITS: 100 INJECTION, SOLUTION INTRAVENOUS; SUBCUTANEOUS at 09:05

## 2022-05-07 RX ADMIN — CALCIUM ACETATE 1334 MG: 667 CAPSULE ORAL at 05:05

## 2022-05-07 RX ADMIN — ASPIRIN 81 MG: 81 TABLET, COATED ORAL at 08:05

## 2022-05-07 RX ADMIN — THERA TABS 1 TABLET: TAB at 08:05

## 2022-05-07 NOTE — ASSESSMENT & PLAN NOTE
Gram positive rods on BC X 1 ?Bacillus cereus   Had enteritis/Food poisoning which has been resolved(ate chinese food before onset of symptoms)  Blood culture send to [facility] for Microbial Identification system based on MALDI-TOF mass Spectrometry  Repeat Blood cultures ordered

## 2022-05-07 NOTE — SUBJECTIVE & OBJECTIVE
Interval History:     Review of Systems   Constitutional:  Negative for activity change and appetite change.   HENT:  Negative for congestion and dental problem.    Eyes:  Negative for discharge and itching.   Respiratory:  Negative for shortness of breath.    Cardiovascular:  Negative for chest pain.   Gastrointestinal:  Negative for abdominal distention and abdominal pain.   Endocrine: Negative for cold intolerance.   Genitourinary:  Negative for difficulty urinating and dysuria.   Musculoskeletal:  Negative for arthralgias and back pain.   Skin:  Negative for color change.   Neurological:  Negative for dizziness and facial asymmetry.   Hematological:  Negative for adenopathy.   Psychiatric/Behavioral:  Negative for agitation and behavioral problems.    Objective:     Vital Signs (Most Recent):  Temp: 100 °F (37.8 °C) (05/06/22 1448)  Pulse: 85 (05/06/22 1448)  Resp: 18 (05/06/22 1651)  BP: (!) 99/48 (05/06/22 1448)  SpO2: 95 % (05/06/22 1448)   Vital Signs (24h Range):  Temp:  [97.5 °F (36.4 °C)-103.1 °F (39.5 °C)] 100 °F (37.8 °C)  Pulse:  [53-90] 85  Resp:  [17-20] 18  SpO2:  [95 %-96 %] 95 %  BP: ()/(39-77) 99/48     Weight: 87 kg (191 lb 12.8 oz)  Body mass index is 30.04 kg/m².    Intake/Output Summary (Last 24 hours) at 5/6/2022 2006  Last data filed at 5/6/2022 1420  Gross per 24 hour   Intake 500 ml   Output 720 ml   Net -220 ml      Physical Exam  Vitals and nursing note reviewed.   Constitutional:       General: She is not in acute distress.  HENT:      Head: Atraumatic.      Right Ear: External ear normal.      Left Ear: External ear normal.      Nose: Nose normal.      Mouth/Throat:      Mouth: Mucous membranes are moist.   Eyes:      General: No scleral icterus.  Cardiovascular:      Rate and Rhythm: Normal rate.   Pulmonary:      Effort: Pulmonary effort is normal.   Abdominal:      General: There is no distension.   Musculoskeletal:         General: Normal range of motion.      Cervical back:  Normal range of motion.   Skin:     General: Skin is warm.      Comments: Foot ulcer   Neurological:      Mental Status: She is alert and oriented to person, place, and time.   Psychiatric:         Behavior: Behavior normal.       Significant Labs: All pertinent labs within the past 24 hours have been reviewed.  CBC:   Recent Labs   Lab 05/04/22 2040 05/05/22 0320 05/06/22  0414   WBC 21.06* 18.99* 18.00*   HGB 9.2* 9.0* 7.8*   HCT 28.5* 27.4* 24.6*    276 313     CMP:   Recent Labs   Lab 05/04/22 2040 05/05/22 0320 05/06/22  0414   * 131* 135*   K 3.2* 3.4* 4.0   CL 84* 84* 94*   CO2 24 25 28   * 321* 171*   BUN 51* 51* 33*   CREATININE 9.9* 11.0* 7.6*   CALCIUM 8.8 9.1 9.2   PROT 8.0  --   --    ALBUMIN 3.5  --   --    BILITOT 0.9  --   --    ALKPHOS 60  --   --    AST 13  --   --    ALT 11  --   --    ANIONGAP 20* 22* 13   EGFRNONAA 3.9* 3.5* 5.4*       Significant Imaging: I have reviewed all pertinent imaging results/findings within the past 24 hours.

## 2022-05-07 NOTE — ASSESSMENT & PLAN NOTE
Wound care  Need Follow up with Podiatry MD on outpatient basis  MRI r/o osteomyelitis  WBC scan showed medial left foot as source of infection

## 2022-05-07 NOTE — ASSESSMENT & PLAN NOTE
Wound care  Need Follow up with Podiatry MD on outpatient basis  MRI r/o osteomyelitis  Awaiting WBC scan results

## 2022-05-07 NOTE — PROGRESS NOTES
Consult Note  Infectious Disease    Reason for Consult:  Positive blood cultures    HPI: Leanna García is a 56 y.o. female known to me from prior consultation in September of 2018, was admitted through the emergency room yesterday with fever 103,   Vomiting of 1 day's duration, and chronic sinus congestion, postnasal drip, cough at least several weeks duration and a foot ulcer on the plantar/medial surface surface of the left foot of probably several months duration.  The vomiting began within a few hours of a meal at a [employer] restaurant.  She did not have any diarrhea In the emergency room she had a CT scan of the abdomen which suggested possible enteritis and bilateral perinephric fat stranding, as well as cholelithiasis, and extensive vascular calcifications.  her blood sugar was 446, white blood cells 21,000, normal lactic acid.  Photographs taken in the emergency room suggest cellulitis of the medial and plantar foot She was started on Zosyn and doxycycline as she has history of vancomycin allergy. .  She was seen by Podiatry , and MRI did not demonstrate any abscess or drainage and wound care was ordered.  She was noted this morning to have difficulty swallowing and was seen by speech therapy.  Today 1 anaerobic bottle has a Gram-positive mariana prompting this consult.    5/6: interim reviewed. Still febrile through last night. Blood culture does have characteristics of Bacillus cereus. This will be sent out to [facility] for MALDI ID. Respiratory pcr panel was negative. She was able to eat solid food this am. She denies abdominal pain. Podiatry debrided her foot this am and submitted cultures and is ordering a WBC Scan. Coughing less. No wheezes.     5/7 (Daren):  Interim reviewed, discussed with Dr Bailon.  Patient seen and examined at bedside, states she does not feel good today, feels like she got something that is making her feel off, not her usual self.  Labile /58, low-grade fever T-max 102°  yesterday, currently 100.8.  Wound cultures from left foot grew Staph aureus, pending sensitivities.  She is currently on Daptomycin, Zosyn and levofloxacin IV      EXAM & DIAGNOSTICS REVIEWED:   Vitals:     Temp:  [97.7 °F (36.5 °C)-103 °F (39.4 °C)]   Temp: (!) 103 °F (39.4 °C) (nurse notified) (05/07/22 0756)  Pulse: 87 (05/07/22 0756)  Resp: 19 (05/07/22 0756)  BP: (!) 121/58 (05/07/22 0756)  SpO2: 95 % (05/07/22 0756)    Intake/Output Summary (Last 24 hours) at 5/7/2022 0947  Last data filed at 5/7/2022 0837  Gross per 24 hour   Intake 740 ml   Output 600 ml   Net 140 ml       General:  In NAD. Alert and attentive, cooperative, comfortable     Eyes:  Anicteric,  EOMI  ENT:  No ulcers, exudates, thrush, nares patent, edentulous  Neck:  Supple,   Lungs: Clear  Heart:  RRR, 2/6 systolic murmur +  Abd:  Soft, NT, mildly distended, normal BS, no masses or organomegaly appreciated.  :  Voids  no flank tenderness  Musc:  Joints without effusion, swelling, erythema, synovitis but she does have lower extremity muscle wasting.  She has Charcot arthropathy with fallen arches bilaterally and an ulceration on the medial arch   Skin:  No rashes   Neuro:Alert, attentive, speech fluent, face symmetric, moves all extremities, no focal weakness.  Minimally Ambulatory at baseline  Psych:  Calm, cooperative  Lymphatic:        Extrem: Left foot with wound vac in place     No edema, erythema, phlebitis, warm and well perfused  VAD:  Left arm AV fistula  No redness     Isolation:    Wound:           5/6:   This was debrided full thickness this am         General Labs reviewed:  Recent Labs   Lab 05/05/22 0320 05/06/22  0414 05/07/22  0359   WBC 18.99* 18.00* 15.09*   HGB 9.0* 7.8* 7.3*   HCT 27.4* 24.6* 24.3*    313 319       Recent Labs   Lab 05/04/22 2040 05/05/22  0320 05/06/22  0414 05/07/22  0358   * 131* 135* 134*   K 3.2* 3.4* 4.0 4.5   CL 84* 84* 94* 98   CO2 24 25 28 22*   BUN 51* 51* 33* 44*   CREATININE  9.9* 11.0* 7.6* 7.3*   CALCIUM 8.8 9.1 9.2 9.1   PROT 8.0  --   --   --    BILITOT 0.9  --   --   --    ALKPHOS 60  --   --   --    ALT 11  --   --   --    AST 13  --   --   --      Recent Labs   Lab 05/04/22 2040   CRP 32.78*         Micro:  Microbiology Results (last 7 days)       Procedure Component Value Units Date/Time    Blood culture #2 **CANNOT BE ORDERED STAT** [[id number]] Collected: 05/04/22 2155    Order Status: Completed Specimen: Blood from Peripheral, Antecubital, Right Updated: 05/06/22 2232     Blood Culture, Routine No Growth to date      No Growth to date      No Growth to date    Blood culture [[id number]] Collected: 05/06/22 1100    Order Status: Completed Specimen: Blood Updated: 05/06/22 1758     Blood Culture, Routine No Growth to date    Narrative:      Collection has been rescheduled by RE1 at 05/06/2022 08:51 Reason:   Having a scan  Collection has been rescheduled by SLR at 05/06/2022 10:27 Reason:   Unable to collect  Collection has been rescheduled by RE1 at 05/06/2022 10:35 Reason:   Unable to collect  Collection has been rescheduled by RE1 at 05/06/2022 08:51 Reason:   Having a scan  Collection has been rescheduled by SLR at 05/06/2022 10:27 Reason:   Unable to collect  Collection has been rescheduled by RE1 at 05/06/2022 10:35 Reason:   Unable to collect    Culture, ID (Consult) [[id number]] Collected: 05/04/22 2040    Order Status: No result Specimen: Blood Updated: 05/06/22 1516    Culture, Anaerobic [[id number]] Collected: 05/06/22 0753    Order Status: Sent Specimen: Wound from Foot, Left Updated: 05/06/22 0817    Aerobic culture [[id number]] Collected: 05/06/22 0753    Order Status: Sent Specimen: Wound from Foot, Left Updated: 05/06/22 0817    Respiratory Infection Panel (PCR), Nasopharyngeal [[id number]] Collected: 05/05/22 1826    Order Status: Completed Specimen: Nasopharyngeal Swab Updated: 05/05/22 5256     Respiratory Infection Panel Source NP swab     Adenovirus Not Detected      Coronavirus 229E, Common Cold Virus Not Detected     Coronavirus HKU1, Common Cold Virus Not Detected     Coronavirus NL63, Common Cold Virus Not Detected     Coronavirus OC43, Common Cold Virus Not Detected     Comment: The Coronavirus strains detected in this test cause the common cold.  These strains are not the COVID-19 (novel Coronavirus)strain   associated with the respiratory disease outbreak.          SARS-CoV2 (COVID-19) Qualitative PCR Not Detected     Human Metapneumovirus Not Detected     Human Rhinovirus/Enterovirus Not Detected     Influenza A (subtypes H1, H1-2009,H3) Not Detected     Influenza B Not Detected     Parainfluenza Virus 1 Not Detected     Parainfluenza Virus 2 Not Detected     Parainfluenza Virus 3 Not Detected     Parainfluenza Virus 4 Not Detected     Respiratory Syncytial Virus Not Detected     Bordetella Parapertussis (VP2515) Not Detected     Bordetella pertussis (ptxP) Not Detected     Chlamydia pneumoniae Not Detected     Mycoplasma pneumoniae Not Detected     Comment: Respiratory Infection Panel testing performed by Multiplex PCR.       Narrative:      Respiratory Infection Panel source->NP Swab    Blood culture #1 **CANNOT BE ORDERED STAT** [[id number]] Collected: 05/04/22 2040    Order Status: Canceled Specimen: Blood from Peripheral, Antecubital, Right             Imaging Reviewed:   CXR   CT abdomen and pelvis 5/4   1.  Small to moderate amount of fluid in the small intestines with scattered air fluid levels, possibly enteritis 2.  Moderate bilateral perinephric stranding. Correlate with urinalysis to exclude the possibility of pyelonephritis 3.  Gallbladder distention with gallstones     MRI of the left midfoot 5/5  IMPRESSION: 16mm skin ulceration in the medial plantar soft tissues with associated cellulitis. There is no abscess or osteomyelitis   Moderate degenerative changes of the midfoot    Cardiology:   ECHO 5/6/22  The left ventricle is normal in size with normal  systolic function.  The estimated ejection fraction is 65%.  Normal left ventricular diastolic function.  Normal right ventricular size with normal right ventricular systolic function.  Mild mitral regurgitation.  All valves visualized, normal.    IMPRESSION & PLAN     1. Positive blood culture of unclear significance, persistent fever   It may be Bacillus cereus, associated with [employer] restaurant related food poisoning/fried rice, is a Gram-positive mariana. Being sent to [facility] for MALDI   If this is an anaerobe, we might related to the recent polypectomies but fortunately there is no sign of perforation on her CT scan   Heart murmur,?MR    2. Left foot ulcer , debrided per podiatry 5/6 and MRI is negative   Wound culture 5/6 Staph aureus, pending sensitivities    3. Awaiting the speciation of prior Gram-positive mariana identified on 5/4, results updated, only 2 bottles with no growth today, will clarify with lab       Recommendations:  Stop Levaquin  Repeat blood cultures one more set   Continue Daptomycin 500 mg IV to cover Staph aureus and probable Bacillus  Continue Zosyn 4.5 g IV q.12 for probable Bacillus  Astelin nasal spray, Antitussives, Bronchodilators   WBC scan ordered per podiatry to look for bone infection in foot, pending report  Will follow    D/w nursing, Dr Stacy    Medical Decision Making during this encounter was  [_] Low Complexity  [_] Moderate Complexity  [x  ] High Complexity

## 2022-05-07 NOTE — SUBJECTIVE & OBJECTIVE
Interval History:     Review of Systems   Constitutional:  Negative for activity change and appetite change.   HENT:  Negative for congestion and dental problem.    Eyes:  Negative for discharge and itching.   Respiratory:  Negative for shortness of breath.    Cardiovascular:  Negative for chest pain.   Gastrointestinal:  Negative for abdominal distention and abdominal pain.   Endocrine: Negative for cold intolerance.   Genitourinary:  Negative for difficulty urinating and dysuria.   Musculoskeletal:  Negative for arthralgias and back pain.   Skin:  Negative for color change.   Neurological:  Positive for weakness. Negative for dizziness and facial asymmetry.   Hematological:  Negative for adenopathy.   Psychiatric/Behavioral:  Negative for agitation and behavioral problems.    Objective:     Vital Signs (Most Recent):  Temp: 100.3 °F (37.9 °C) (05/07/22 1608)  Pulse: 84 (05/07/22 1608)  Resp: 17 (05/07/22 1608)  BP: 134/61 (05/07/22 1608)  SpO2: (!) 94 % (05/07/22 1608)   Vital Signs (24h Range):  Temp:  [98.6 °F (37 °C)-103 °F (39.4 °C)] 100.3 °F (37.9 °C)  Pulse:  [56-93] 84  Resp:  [17-20] 17  SpO2:  [93 %-96 %] 94 %  BP: ()/(41-63) 134/61     Weight: 90 kg (198 lb 6.6 oz)  Body mass index is 31.08 kg/m².    Intake/Output Summary (Last 24 hours) at 5/7/2022 1646  Last data filed at 5/7/2022 0837  Gross per 24 hour   Intake 240 ml   Output 0 ml   Net 240 ml      Physical Exam  Vitals and nursing note reviewed.   Constitutional:       General: She is not in acute distress.  HENT:      Head: Atraumatic.      Right Ear: External ear normal.      Left Ear: External ear normal.      Nose: Nose normal.      Mouth/Throat:      Mouth: Mucous membranes are moist.   Eyes:      General: No scleral icterus.  Cardiovascular:      Rate and Rhythm: Normal rate.   Pulmonary:      Effort: Pulmonary effort is normal.   Abdominal:      General: There is no distension.   Musculoskeletal:         General: Normal range of  motion.      Cervical back: Normal range of motion.   Skin:     General: Skin is warm.   Neurological:      Mental Status: She is alert and oriented to person, place, and time.   Psychiatric:         Behavior: Behavior normal.       Significant Labs: All pertinent labs within the past 24 hours have been reviewed.  CBC:   Recent Labs   Lab 05/06/22 0414 05/07/22  0359   WBC 18.00* 15.09*   HGB 7.8* 7.3*   HCT 24.6* 24.3*    319     CMP:   Recent Labs   Lab 05/06/22 0414 05/07/22  0358   * 134*   K 4.0 4.5   CL 94* 98   CO2 28 22*   * 153*   BUN 33* 44*   CREATININE 7.6* 7.3*   CALCIUM 9.2 9.1   ANIONGAP 13 14   EGFRNONAA 5.4* 5.7*       Significant Imaging: I have reviewed all pertinent imaging results/findings within the past 24 hours.

## 2022-05-07 NOTE — PROGRESS NOTES
INPATIENT NEPHROLOGY CONSULT   [facility] NEPHROLOGY    Leanna García  05/07/2022    Reason for consultation:    esrd    Chief Complaint:   Chief Complaint   Patient presents with    Fever          History of Present Illness:    Per H and P    Ms. García is a 56-year-old female with a past medical history of ESRD on HD Monday Wednesday Friday, hypertension, IDDM2, PAF, and chronic cough who presents today with complaints of fever up to 103. It is severe.  It is associated with cough, posttussive vomiting, fatigue, sinus congestion, weakness, and a left foot ulcer.  She denies chest pain, diarrhea, dizziness, loss of consciousness.  In the ED she was noted to have a large left foot ulceration at the plantar surface and on the side of the foot.  CT of abdomen pelvis also reveals possible enteritis and bilateral perinephric fat stranding.  WBCs 21 K, troponin is 0.4 in the setting of ESRD.  She did not go to dialysis today she felt too bad.  Glucose is 446, anion gap 20, bicarb 24.     5/5  C/o foot pain.  Mild dyspnea.  No vomiting, chest pain, urinary or bowel complaints.  Weak  5/6  Currently getting wound vac placed.  Has foot pain. No respiratory distress  5/7 still spiking fevers.  Foot pain.  Stopped hd early yesterday    Plan of Care:       Assessment:    esrd  --continue dialysis per routine  --fluid restrict  --renal dose medication per routine  --continue outpt medication  --continue binders with meals     Anemia--worsening  --increase dose of erythropoiesis stimulating agent with renal replacement therapy    Hypertension  --fluid restrict  --low salt diet  --uf with hd  --continue minoxidil and metoprolol     Hypokalemia  --better    Hyponatremia  --fluid restrict  --140 Na dialysate              Thank you for allowing us to participate in this patient's care. We will continue to follow.    Vital Signs:  Temp Readings from Last 3 Encounters:   05/07/22 99.4 °F (37.4 °C) (Oral)   04/26/22 98.2 °F (36.8  °C)   03/10/22 97.3 °F (36.3 °C) (Tympanic)       Pulse Readings from Last 3 Encounters:   05/07/22 75   04/26/22 66   04/05/22 75       BP Readings from Last 3 Encounters:   05/07/22 (!) 83/41   04/28/22 (!) 180/82   04/26/22 (!) 174/66       Weight:  Wt Readings from Last 3 Encounters:   05/07/22 90 kg (198 lb 6.6 oz)   05/06/22 86.6 kg (191 lb)   04/28/22 94 kg (207 lb 5.5 oz)       Past Medical & Surgical History:  Past Medical History:   Diagnosis Date    A-fib     resolved per patient    Arthritis     Bronchitis     Cardiovascular event risk, ASCVD 10-year risk 6.7% 10/15/2016    Diabetes mellitus     Diabetes mellitus type II     Diabetic ulcer of left midfoot 5/5/2022    Disorder of kidney and ureter     Encounter for blood transfusion     ESRD (end stage renal disease) 5/18/2018    MANJINDER (generalized anxiety disorder) 10/25/2016    History of colon polyps 11/02/2016    Hyperlipidemia     Hypertension     Stroke        Past Surgical History:   Procedure Laterality Date    COLONOSCOPY N/A 10/5/2016    Procedure: COLONOSCOPY;  Surgeon: Pillo Chanel MD;  Location: [facility] ENDO;  Service: Endoscopy;  Laterality: N/A;    COLONOSCOPY N/A 4/26/2022    Procedure: COLONOSCOPY;  Surgeon: Saravanan Rachel MD;  Location: [facility] ENDO;  Service: Endoscopy;  Laterality: N/A;    HERNIA REPAIR Bilateral 11/22/2016    inguinal    HYSTERECTOMY      OOPHORECTOMY         Past Social History:  Social History     Socioeconomic History    Marital status:    Tobacco Use    Smoking status: Never Smoker    Smokeless tobacco: Never Used   Substance and Sexual Activity    Alcohol use: No    Drug use: No    Sexual activity: Yes     Partners: Male   Social History Narrative    Caregiver      Social Determinants of Health     Financial Resource Strain: Low Risk     Difficulty of Paying Living Expenses: Not very hard   Food Insecurity: Unknown    Worried About Running Out of Food in the Last Year:  Patient refused    Ran Out of Food in the Last Year: Patient refused   Transportation Needs: Unknown    Lack of Transportation (Medical): Patient refused    Lack of Transportation (Non-Medical): Patient refused   Physical Activity: Insufficiently Active    Days of Exercise per Week: 7 days    Minutes of Exercise per Session: 20 min   Stress: No Stress Concern Present    Feeling of Stress : Only a little   Social Connections: Unknown    Frequency of Communication with Friends and Family: Patient refused    Frequency of Social Gatherings with Friends and Family: Patient refused    Active Member of Clubs or Organizations: No    Marital Status:        Medications:  No current facility-administered medications on file prior to encounter.     Current Outpatient Medications on File Prior to Encounter   Medication Sig Dispense Refill    ascorbic acid, vitamin C, (VITAMIN C) 500 MG tablet Take 500 mg by mouth once daily.      aspirin (ECOTRIN) 81 MG EC tablet Take 81 mg by mouth once daily.      atorvastatin (LIPITOR) 80 MG tablet Take 1 tablet (80 mg total) by mouth once daily. (Patient taking differently: Take 80 mg by mouth every evening.) 90 tablet 3    blood sugar diagnostic Strp To check BG 4 times daily, to use with insurance preferred meter (Patient taking differently: 1 strip by Misc.(Non-Drug; Combo Route) route 4 (four) times daily. To check BG 4 times daily, to use with insurance preferred meter) 450 strip 3    insulin aspart U-100 (NOVOLOG FLEXPEN U-100 INSULIN) 100 unit/mL (3 mL) InPn pen Inject 5-8 units 3 times per day with food. 1 Box 6    insulin detemir U-100 (LEVEMIR FLEXTOUCH U-100 INSULN) 100 unit/mL (3 mL) InPn pen Inject 15-18 Units into the skin once daily. 1 Box 6    lancets Misc To use to check blood sugar 4 times daily. To use with insurance approved meter. Patient needs the round, purple one (Patient taking differently: 1 lancet by Misc.(Non-Drug; Combo Route) route 4  "(four) times daily. To use to check blood sugar 4 times daily. To use with insurance approved meter. Patient needs the round, purple one) 450 each 3    metoprolol tartrate (LOPRESSOR) 25 MG tablet Take 25 mg by mouth 2 (two) times daily.      minoxidiL (LONITEN) 2.5 MG tablet Take 5 mg by mouth 2 (two) times daily.      multivitamin (THERAGRAN) per tablet Take 1 tablet by mouth once daily.      pen needle, diabetic (BD ULTRA-FINE ROBERT PEN NEEDLE) 32 gauge x 5/32" Ndle To use 4 times per day with insulin injections. (Patient taking differently: 1 pen by Misc.(Non-Drug; Combo Route) route 4 (four) times daily. To use 4 times per day with insulin injections.) 450 each 2    sucroferric oxyhydroxide (VELPHORO) 500 mg Chew Take 500 mg by mouth 3 (three) times daily.      dextrose (GLUCOSE GEL) 40 % gel Take 37.5 mLs (15,000 mg total) by mouth once as needed (hypoglycemia). 37.5 g 4    gabapentin (NEURONTIN) 300 MG capsule Take 300 mg by mouth every evening.      glucagon (BAQSIMI) 3 mg/actuation Spry 3 mg (one actuation) into a single nostril; if no response, may repeat in 15 minutes using a new intranasal device. (Patient taking differently: 3 mg by Left Nostril route as needed. 3 mg (one actuation) into a single nostril; if no response, may repeat in 15 minutes using a new intranasal device.) 2 each 1    [DISCONTINUED] blood-glucose meter (ACCU-CHEK KAYLIE PLUS METER) Misc To use to check blood sugars 4 times a day 1 each 0    [DISCONTINUED] clorazepate (TRANXENE) 3.75 MG Tab Take 7.5 mg by mouth nightly as needed.       [DISCONTINUED] fenofibrate 160 MG Tab Take 1 tablet (160 mg total) by mouth once daily. 90 tablet 3    [DISCONTINUED] lancing device with lancets (ACCU-CHEK SOFT DEV LANCETS) Kit To check blood sugars 4 times per day 400 each 3     Scheduled Meds:   ascorbic acid (vitamin C)  500 mg Oral Daily    aspirin  81 mg Oral Daily    atorvastatin  80 mg Oral QHS    azelastine  1 spray Nasal BID " "   calcium acetate(phosphat bind)  1,334 mg Oral TID WM    cetirizine  10 mg Oral Every other day    collagenase   Topical (Top) Daily    DAPTOmycin (CUBICIN)  IV  500 mg Intravenous Q48H    epoetin chris-epbx  50 Units/kg Intravenous Every Mon, Wed, Fri    fluticasone propionate  2 spray Each Nostril Daily    gabapentin  300 mg Oral QHS    insulin detemir U-100  20 Units Subcutaneous QHS    levoFLOXacin  500 mg Intravenous Q48H    metoprolol tartrate  25 mg Oral BID    multivitamin  1 tablet Oral Daily    mupirocin   Nasal BID    piperacillin-tazobactam (ZOSYN) IVPB  3.375 g Intravenous Q12H     Continuous Infusions:  PRN Meds:.acetaminophen, benzonatate, dextrose 50%, dextrose 50%, guaiFENesin-codeine 100-10 mg/5 ml, HYDROcodone-acetaminophen, insulin aspart U-100, melatonin, naloxone, ondansetron, polyethylene glycol, sodium chloride 0.9%    Allergies:  Vancomycin and Chlorhexidine    Past Family History:  Reviewed; refer to Hospitalist Admission Note    Review of Systems:  Review of Systems - All 14 systems reviewed and negative, except as noted in HPI    Physical Exam:    BP (!) 83/41 (BP Location: Right arm, Patient Position: Lying)   Pulse 75   Temp 99.4 °F (37.4 °C) (Oral)   Resp 19   Ht 5' 7" (1.702 m)   Wt 90 kg (198 lb 6.6 oz)   SpO2 (!) 94%   BMI 31.08 kg/m²     General Appearance:    Alert, cooperative, no distress, appears stated age   Head:    Normocephalic, without obvious abnormality, atraumatic   Eyes:    PER, conjunctiva/corneas clear, EOM's intact in both eyes        Throat:   Lips, mucosa, and tongue normal; teeth and gums normal   Back:     Symmetric, no curvature, ROM normal, no CVA tenderness   Lungs:     Clear to auscultation bilaterally, respirations unlabored   Chest wall:    No tenderness or deformity   Heart:    Regular rate and rhythm, S1 and S2 normal, no murmur, rub   or gallop   Abdomen:     Soft, non-tender, bowel sounds active all four quadrants,     no masses, " no organomegaly   Extremities:   Extremities normal, atraumatic, no cyanosis or edema   Pulses:   2+ and symmetric all extremities   MSK:   No joint or muscle swelling, tenderness or deformity   Skin:   Skin color, texture, turgor normal, no rashes or lesions   Neurologic:   CNII-XII intact, normal strength and sensation       Throughout.  No flap     Results:  Lab Results   Component Value Date     (L) 05/07/2022    K 4.5 05/07/2022    CL 98 05/07/2022    CO2 22 (L) 05/07/2022    BUN 44 (H) 05/07/2022    CREATININE 7.3 (H) 05/07/2022    CALCIUM 9.1 05/07/2022    ANIONGAP 14 05/07/2022    ESTGFRAFRICA 6.6 (A) 05/07/2022    EGFRNONAA 5.7 (A) 05/07/2022       Lab Results   Component Value Date    CALCIUM 9.1 05/07/2022    PHOS 4.2 03/10/2022       Recent Labs   Lab 05/07/22  0359   WBC 15.09*   RBC 2.69*   HGB 7.3*   HCT 24.3*      MCV 90   MCH 27.1   MCHC 30.0*        Imaging Results          X-Ray Foot Complete Left (Final result)  Result time 05/05/22 06:12:52    Final result by Denise Estrada MD (05/05/22 06:12:52)                 Narrative:    Three views of the left foot are compared to prior study dated 12/3/2021    Clinical history is infection    There is osteopenia. There has been prior amputation of the fourth digit at the base of the proximal phalanx.    There are moderate degenerative changes of the midfoot and hindfoot with joint space narrowing and spurring.. There are no fractures, dislocations or osteolysis. There is a subcutaneous ulceration the mid plantar soft tissues. There is moderate peripheral vascular calcification.    IMPRESSION: Prior amputation of fourth digit without radiographic evidence for osteomyelitis.    Moderate degenerative changes of the foot    16mm soft tissue ulceration in the mid plantar medial soft tissues    Electronically signed by:  Denise Estrada MD  5/5/2022 6:12 AM CDT Workstation: [id number]                             CT Abdomen Pelvis  Without  Contrast (Final result)  Result time 05/04/22 21:53:02    Final result by Ernesto Lino MD (05/04/22 21:53:02)                 Narrative:    EXAM: CT ABDOMEN PELVIS WITHOUT CONTRAST    RadLex: CT ABDOMEN PELVIS WITHOUT IV CONTRAST    HISTORY: 56 years  Female;  Abdominal pain, acute, nonlocalized;    TECHNIQUE:   Standard CT of the abdomen and pelvis was performed without the use of intravenous contrast media. Lack of intravenous contrast limits evaluation of soft tissue structures in this study.    CT scans at this facility use dose modulation, iterative reconstruction and/or weight based dosing when appropriate to reduce radiation dose to as low as reasonably achievable.    COMPARISON: None available    FINDING(S):    ABDOMEN:    Lung bases show dependent atelectasis. Coronary artery calcifications are seen. Cardiac size normal. Mild thickening of the distal esophagus with small hiatal hernia.    Small amount of fluid is seen in the stomach. Mild thickening of the stomach lining.    Mild hepatic steatosis. Spleen, pancreas, and both adrenals are normal.    There is gallbladder distention with multiple gallstones.    Neither kidney shows hydronephrosis. There is moderate bilateral perinephric stranding which appears above expected and may represent inflammation. Correlate with urinalysis to exclude the possibility of pyelonephritis.    There are vascular calcifications involving both kidneys. There may be separate small stones as well. The ureters themselves are decompressed.    Marked atherosclerosis affects the aorta and the major branch vessels. No abdominal lymphadenopathy or free abdominal fluid.    The small intestines contain a small to moderate amount of fluid with scattered air-fluid levels. No transition point to suggest obstruction.    No evidence of appendicitis. Oral contrast is seen in the intestines. There is a mild to moderate stool burden. Scattered colonic diverticulosis.    PELVIS:    Bladder  is decompressed. No free fluid within the pelvis. There is an enlarged left external iliac lymph node measuring 1.6 x 0.8 cm on series 3, image 210.    No other groin or pelvis lymphadenopathy. A small left inguinal hernia contains only fat.    Small umbilical hernia contains only fat.    There are degenerative changes in the lower lumbar spine.    IMPRESSION:    1.  Small to moderate amount of fluid in the small intestines with scattered air fluid levels, possibly enteritis  2.  Moderate bilateral perinephric stranding. Correlate with urinalysis to exclude the possibility of pyelonephritis  3.  Gallbladder distention with gallstones    Electronically signed by:  Ernesto Lino MD  5/4/2022 9:53 PM CDT Workstation: [id number]                             X-Ray Chest 1 View (Final result)  Result time 05/05/22 06:10:56    Final result by Denise Estrada MD (05/05/22 06:10:56)                 Narrative:    XR CHEST 1 VIEW    CLINICAL HISTORY:  56 years Female pain    COMPARISON: 5/19/2021    FINDINGS: Cardiomediastinal silhouette is within normal limits. Lungs are normally expanded with no airspace consolidation. No pleural effusion or pneumothorax. No acute osseous abnormality.    IMPRESSION: No acute pulmonary process.    Electronically signed by:  Denise Estrada MD  5/5/2022 6:10 AM CDT Workstation: [id number]                                I have personally reviewed pertinent radiological imaging and reports.     Patient care was time spent personally by me on the following activities:   · Obtaining a history  · Examination of patient.  · Providing medical care at the patients bedside.  · Developing a treatment plan with patient or surrogate and bedside caregivers  · Ordering and reviewing laboratory studies, radiographic studies, pulse oximetry.  · Ordering and performing treatments and interventions.  · Evaluation of patient's response to treatment.  · Discussions with consultants while on the unit and  immediately available to the patient.  · Re-evaluation of the patient's condition.  · Documentation in the medical record.     Salvador Carlson MD  Nephrology  [facility] [place]  [phone]

## 2022-05-07 NOTE — ASSESSMENT & PLAN NOTE
Gram positive rods on BC X 1 ?Bacillus cereus   Had enteritis/Food poisoning which has been resolved(ate chinese food before onset of symptoms)  Blood culture send to [facility] for Microbial Identification system based on MALDI-TOF mass Spectrometry  Repeat Blood cultures ordered

## 2022-05-07 NOTE — PLAN OF CARE
05/07/22 0900   Patient Assessment/Suction   Level of Consciousness (AVPU) alert   Respiratory Effort Unlabored   PRE-TX-O2   O2 Device (Oxygen Therapy) nasal cannula   $ Is the patient on Low Flow Oxygen? Yes   Flow (L/min) 2   SpO2 (!) 93 %   Pulse Oximetry Type Intermittent   $ Pulse Oximetry - Multiple Charge Pulse Oximetry - Multiple   Pulse 89   Resp 18   Respiratory Evaluation   $ Care Plan Tech Time 15 min

## 2022-05-07 NOTE — CARE UPDATE
05/06/22 2133   Patient Assessment/Suction   Level of Consciousness (AVPU) alert   Respiratory Effort Normal;Unlabored   Expansion/Accessory Muscles/Retractions no use of accessory muscles   All Lung Fields Breath Sounds clear;diminished   Cough Frequency frequent   Cough Type nonproductive   PRE-TX-O2   O2 Device (Oxygen Therapy) nasal cannula   Flow (L/min) 2   SpO2 96 %   Pulse Oximetry Type Intermittent   $ Pulse Oximetry - Multiple Charge Pulse Oximetry - Multiple   Pulse 93   Resp 20   Maintain adequate oxygenation

## 2022-05-07 NOTE — PT/OT/SLP EVAL
Physical Therapy Evaluation    Patient Name:  Leanna García   MRN:  [id number]    Recommendations:     Discharge Recommendations:  nursing facility, skilled   Discharge Equipment Recommendations: wheelchair   Barriers to discharge: Inaccessible home, Decreased caregiver support and 5 steps to enter home. Pt is currently NWB on L LE. Will require ramp    Assessment:     Leanna García is a 56 y.o. female admitted with a medical diagnosis of Bacteremia.  She presents with the following impairments/functional limitations:  weakness, impaired functional mobilty, impaired cardiopulmonary response to activity, impaired endurance, gait instability, pain, impaired sensation, impaired balance, impaired skin, edema, orthopedic precautions, decreased lower extremity function, impaired self care skills Pt found seated in BS chair, lethargic. Spouse present and offering support. He expresses concern over being able to care for patient while NWB as he has a bad back and will not be able to provide lift assistance. Pt will not be able to manage 5 steps NWB on l LE. Today she required max A x 2 for SPT chair to bed to maintain NWB on L LE. She was mobilizing with RW PTA and spouse provided incidental assistance.She will benefit from post acute therapy to maximize safety and functional ependence and reducee caregiver burden..    Rehab Prognosis: Fair; patient would benefit from acute skilled PT services to address these deficits and reach maximum level of function.  Podiatry note states pt to be NWB on L LE, unknown time frame  Recent Surgery: * No surgery found *      Plan:     During this hospitalization, patient to be seen daily to address the identified rehab impairments via therapeutic activities, therapeutic exercises, wheelchair management/training and progress toward the following goals:    · Plan of Care Expires:  06/07/22    Subjective     Chief Complaint: fatigued  Patient/Family Comments/goals: return to  PLF  Pain/Comfort:  · Pain Rating 1: 0/10  · Pain Rating Post-Intervention 1: 0/10    Patients cultural, spiritual, Pentec[employer]alism conflicts given the current situation: no    Living Environment:  Lives w spouse in H, 5 steps w HR to enter, pt managing steps PTA but now NWB L LE  Prior to admission, patients level of function was intermittent assistance.  Equipment used at home: rollator, shower chair.  DME owned (not currently used): wheelchair.  Upon discharge, patient will have assistance from spouse.    Objective:     Communicated with nurse, Narda,  prior to session.  Patient found up in chair with peripheral IV, oxygen, wound vac  upon PT entry to room.    General Precautions: Standard, fall, diabetic   Orthopedic Precautions:LLE non weight bearing   Braces:  (waiting on surgical shoe)  Respiratory Status: Nasal cannula, flow 2 L/min    Exams:  · Cognitive Exam:  Patient is oriented to Person, Place and Situation  · Skin Integrity/Edema:      · -       wound vac in place medial L foot following debridement  · RLE Strength: WFL  · LLE Strength: WFL   · Generalized weakness  · B foot deformity      Functional Mobility:  · Bed Mobility:     · Sit to Supine: stand by assistance  · Transfers:     · Sit to Stand:  moderate assistance and of 2 persons with RW, NWB L LE  · Bed to Chair: maximal assistance and of 2 persons with  rolling walker and to maintain NWB L LE  using  Stand Pivot  · Balance: fair    Therapeutic Activities and Exercises:   Pt and spouse educated in role and goals of PT, NWB status of L LE, use of RW for transfers, DC planning    AM-PAC 6 CLICK MOBILITY  Total Score:13     Patient left HOB elevated with all lines intact, call button in reach and spouse present.    GOALS:   Multidisciplinary Problems     Physical Therapy Goals        Problem: Physical Therapy    Goal Priority Disciplines Outcome Goal Variances Interventions   Physical Therapy Goal     PT, PT/OT Ongoing, Progressing      Description: Goals to be met by: 22     Patient will increase functional independence with mobility by performin. Supine to sit with Modified [place]  2. Sit to supine with Modified [place]  3. Sit to stand transfer with Contact Guard Assistance  4. Bed to chair transfer with Contact Guard Assistance using Rolling Walker maintaining NWB L LE  5. Wheelchair propulsion x150 feet with Supervision using bilateral uppper extremities                     History:     Past Medical History:   Diagnosis Date    A-fib     resolved per patient    Arthritis     Bronchitis     Cardiovascular event risk, ASCVD 10-year risk 6.7% 10/15/2016    Diabetes mellitus     Diabetes mellitus type II     Diabetic ulcer of left midfoot 2022    Disorder of kidney and ureter     Encounter for blood transfusion     ESRD (end stage renal disease) 2018    MANJINDER (generalized anxiety disorder) 10/25/2016    History of colon polyps 2016    Hyperlipidemia     Hypertension     Stroke        Past Surgical History:   Procedure Laterality Date    COLONOSCOPY N/A 10/5/2016    Procedure: COLONOSCOPY;  Surgeon: Pillo Chanel MD;  Location: [facility] ENDO;  Service: Endoscopy;  Laterality: N/A;    COLONOSCOPY N/A 2022    Procedure: COLONOSCOPY;  Surgeon: Saravanan Rachel MD;  Location: [facility] ENDO;  Service: Endoscopy;  Laterality: N/A;    HERNIA REPAIR Bilateral 2016    inguinal    HYSTERECTOMY      OOPHORECTOMY         Time Tracking:     PT Received On: 22  PT Start Time: 1250     PT Stop Time: 1314  PT Total Time (min): 24 min     Billable Minutes: Evaluation 15 and Therapeutic Activity 9      2022

## 2022-05-07 NOTE — PLAN OF CARE
Problem: Physical Therapy  Goal: Physical Therapy Goal  Description: Goals to be met by: 22     Patient will increase functional independence with mobility by performin. Supine to sit with Modified [place]  2. Sit to supine with Modified [place]  3. Sit to stand transfer with Contact Guard Assistance  4. Bed to chair transfer with Contact Guard Assistance using Rolling Walker maintaining NWB L LE  5. Wheelchair propulsion x150 feet with Supervision using bilateral uppper extremities    Outcome: POC established

## 2022-05-07 NOTE — PROGRESS NOTES
[facility] Medicine  Progress Note    Patient Name: Leanna García  MRN: [id number]  Patient Class: IP- Inpatient   Admission Date: 5/4/2022  Length of Stay: 3 days  Attending Physician: Usman Stacy MD  Primary Care Provider: Stefano Lopez MD        Subjective:     Principal Problem:Bacteremia        HPI:  Ms. García is a 56-year-old female with a past medical history of ESRD on HD Monday Wednesday Friday, hypertension, IDDM2, PAF, and chronic cough who presents today with complaints of fever up to 103. It is severe.  It is associated with cough, posttussive vomiting, fatigue, sinus congestion, weakness, and a left foot ulcer.  She denies chest pain, diarrhea, dizziness, loss of consciousness.  In the ED she was noted to have a large left foot ulceration at the plantar surface and on the side of the foot.  CT of abdomen pelvis also reveals possible enteritis and bilateral perinephric fat stranding.  WBCs 21 K, troponin is 0.4 in the setting of ESRD.  She did not go to dialysis today she felt too bad.  Glucose is 446, anion gap 20, bicarb 24.       Overview/Hospital Course:  05/05  Had HD today  MRI r/o osteomyelitis  Afebrile now    05/06  Had febrile episodes overnight  Blood cultures send to [facility] in [facility]  Had dialysis today  Awaiting WBC scan     05/07  WBC scan showed medial left foot infection  C/o extreme fatigue/weakness      Interval History:     Review of Systems   Constitutional:  Negative for activity change and appetite change.   HENT:  Negative for congestion and dental problem.    Eyes:  Negative for discharge and itching.   Respiratory:  Negative for shortness of breath.    Cardiovascular:  Negative for chest pain.   Gastrointestinal:  Negative for abdominal distention and abdominal pain.   Endocrine: Negative for cold intolerance.   Genitourinary:  Negative for difficulty urinating and dysuria.   Musculoskeletal:  Negative for arthralgias and back pain.   Skin:  Negative for  color change.   Neurological:  Positive for weakness. Negative for dizziness and facial asymmetry.   Hematological:  Negative for adenopathy.   Psychiatric/Behavioral:  Negative for agitation and behavioral problems.    Objective:     Vital Signs (Most Recent):  Temp: 100.3 °F (37.9 °C) (05/07/22 1608)  Pulse: 84 (05/07/22 1608)  Resp: 17 (05/07/22 1608)  BP: 134/61 (05/07/22 1608)  SpO2: (!) 94 % (05/07/22 1608)   Vital Signs (24h Range):  Temp:  [98.6 °F (37 °C)-103 °F (39.4 °C)] 100.3 °F (37.9 °C)  Pulse:  [56-93] 84  Resp:  [17-20] 17  SpO2:  [93 %-96 %] 94 %  BP: ()/(41-63) 134/61     Weight: 90 kg (198 lb 6.6 oz)  Body mass index is 31.08 kg/m².    Intake/Output Summary (Last 24 hours) at 5/7/2022 1646  Last data filed at 5/7/2022 0837  Gross per 24 hour   Intake 240 ml   Output 0 ml   Net 240 ml      Physical Exam  Vitals and nursing note reviewed.   Constitutional:       General: She is not in acute distress.  HENT:      Head: Atraumatic.      Right Ear: External ear normal.      Left Ear: External ear normal.      Nose: Nose normal.      Mouth/Throat:      Mouth: Mucous membranes are moist.   Eyes:      General: No scleral icterus.  Cardiovascular:      Rate and Rhythm: Normal rate.   Pulmonary:      Effort: Pulmonary effort is normal.   Abdominal:      General: There is no distension.   Musculoskeletal:         General: Normal range of motion.      Cervical back: Normal range of motion.   Skin:     General: Skin is warm.   Neurological:      Mental Status: She is alert and oriented to person, place, and time.   Psychiatric:         Behavior: Behavior normal.       Significant Labs: All pertinent labs within the past 24 hours have been reviewed.  CBC:   Recent Labs   Lab 05/06/22 0414 05/07/22  0359   WBC 18.00* 15.09*   HGB 7.8* 7.3*   HCT 24.6* 24.3*    319     CMP:   Recent Labs   Lab 05/06/22 0414 05/07/22  0358   * 134*   K 4.0 4.5   CL 94* 98   CO2 28 22*   * 153*   BUN 33*  44*   CREATININE 7.6* 7.3*   CALCIUM 9.2 9.1   ANIONGAP 13 14   EGFRNONAA 5.4* 5.7*       Significant Imaging: I have reviewed all pertinent imaging results/findings within the past 24 hours.      Assessment/Plan:      * Bacteremia  Gram positive rods on BC X 1 ?Bacillus cereus   Had enteritis/Food poisoning which has been resolved(ate chinese food before onset of symptoms)  Blood culture send to [facility] for Microbial Identification system based on MALDI-TOF mass Spectrometry  Repeat Blood cultures ordered       Sepsis  As above       Diabetic ulcer of left midfoot  Wound care  Need Follow up with Podiatry MD on outpatient basis  MRI r/o osteomyelitis  WBC scan showed medial left foot as source of infection    Diabetic foot infection  As above   WBC scan showed medial left foot as source of infection      Foot infection  As above       Cough  Symptomatic management      Acute bronchitis  Symptomatic management      Elevated troponin  Likely demand ischemia in the setting of ESRD        Hypokalemia  Stable     Enteritis  Resolved     Type 2 diabetes mellitus with kidney complication, with long-term current use of insulin  Maintain present regime       ESRD (end stage renal disease)  HD sessions as scheduled         VTE Risk Mitigation (From admission, onward)         Ordered     IP VTE HIGH RISK PATIENT  Once         05/05/22 0003     Place sequential compression device  Until discontinued         05/05/22 0003                Discharge Planning   ERI: 5/9/2022     Code Status: Full Code   Is the patient medically ready for discharge?: No    Reason for patient still in hospital (select all that apply): Treatment  Discharge Plan A: Home with family                  Usman Stacy MD  Department of Hospital Medicine   [facility]

## 2022-05-08 PROBLEM — L02.612 ABSCESS OF LEFT FOOT: Status: ACTIVE | Noted: 2022-05-08

## 2022-05-08 PROBLEM — D64.9 CHRONIC ANEMIA: Status: ACTIVE | Noted: 2022-05-08

## 2022-05-08 LAB
ANION GAP SERPL CALC-SCNC: 11 MMOL/L (ref 8–16)
BACTERIA SPEC AEROBE CULT: ABNORMAL
BACTERIA SPEC ANAEROBE CULT: NORMAL
BASOPHILS # BLD AUTO: 0.07 K/UL (ref 0–0.2)
BASOPHILS NFR BLD: 0.4 % (ref 0–1.9)
BUN SERPL-MCNC: 71 MG/DL (ref 6–20)
CALCIUM SERPL-MCNC: 9.6 MG/DL (ref 8.7–10.5)
CHLORIDE SERPL-SCNC: 95 MMOL/L (ref 95–110)
CO2 SERPL-SCNC: 29 MMOL/L (ref 23–29)
CREAT SERPL-MCNC: 10.1 MG/DL (ref 0.5–1.4)
CRP SERPL-MCNC: 23.11 MG/DL
DIFFERENTIAL METHOD: ABNORMAL
EOSINOPHIL # BLD AUTO: 0.1 K/UL (ref 0–0.5)
EOSINOPHIL NFR BLD: 0.8 % (ref 0–8)
ERYTHROCYTE [DISTWIDTH] IN BLOOD BY AUTOMATED COUNT: 14.7 % (ref 11.5–14.5)
EST. GFR  (AFRICAN AMERICAN): 4.4 ML/MIN/1.73 M^2
EST. GFR  (NON AFRICAN AMERICAN): 3.9 ML/MIN/1.73 M^2
GLUCOSE SERPL-MCNC: 131 MG/DL (ref 70–110)
GLUCOSE SERPL-MCNC: 149 MG/DL (ref 70–110)
GLUCOSE SERPL-MCNC: 154 MG/DL (ref 70–110)
GLUCOSE SERPL-MCNC: 208 MG/DL (ref 70–110)
GLUCOSE SERPL-MCNC: 215 MG/DL (ref 70–110)
HCT VFR BLD AUTO: 23.3 % (ref 37–48.5)
HGB BLD-MCNC: 7 G/DL (ref 12–16)
IMM GRANULOCYTES # BLD AUTO: 0.14 K/UL (ref 0–0.04)
IMM GRANULOCYTES NFR BLD AUTO: 0.8 % (ref 0–0.5)
LYMPHOCYTES # BLD AUTO: 1.7 K/UL (ref 1–4.8)
LYMPHOCYTES NFR BLD: 9.5 % (ref 18–48)
MCH RBC QN AUTO: 27.5 PG (ref 27–31)
MCHC RBC AUTO-ENTMCNC: 30 G/DL (ref 32–36)
MCV RBC AUTO: 91 FL (ref 82–98)
MONOCYTES # BLD AUTO: 1.6 K/UL (ref 0.3–1)
MONOCYTES NFR BLD: 9.3 % (ref 4–15)
NEUTROPHILS # BLD AUTO: 13.9 K/UL (ref 1.8–7.7)
NEUTROPHILS NFR BLD: 79.2 % (ref 38–73)
NRBC BLD-RTO: 0 /100 WBC
PLATELET # BLD AUTO: 332 K/UL (ref 150–450)
PMV BLD AUTO: 10.6 FL (ref 9.2–12.9)
POTASSIUM SERPL-SCNC: 4.4 MMOL/L (ref 3.5–5.1)
RBC # BLD AUTO: 2.55 M/UL (ref 4–5.4)
SODIUM SERPL-SCNC: 135 MMOL/L (ref 136–145)
WBC # BLD AUTO: 17.57 K/UL (ref 3.9–12.7)

## 2022-05-08 PROCEDURE — 25000003 PHARM REV CODE 250: Performed by: NURSE PRACTITIONER

## 2022-05-08 PROCEDURE — 80048 BASIC METABOLIC PNL TOTAL CA: CPT | Performed by: NURSE PRACTITIONER

## 2022-05-08 PROCEDURE — 85025 COMPLETE CBC W/AUTO DIFF WBC: CPT | Performed by: NURSE PRACTITIONER

## 2022-05-08 PROCEDURE — 87075 CULTR BACTERIA EXCEPT BLOOD: CPT | Performed by: PODIATRIST

## 2022-05-08 PROCEDURE — 63600175 PHARM REV CODE 636 W HCPCS: Performed by: INTERNAL MEDICINE

## 2022-05-08 PROCEDURE — 99232 SBSQ HOSP IP/OBS MODERATE 35: CPT | Mod: ,,, | Performed by: STUDENT IN AN ORGANIZED HEALTH CARE EDUCATION/TRAINING PROGRAM

## 2022-05-08 PROCEDURE — 63600175 PHARM REV CODE 636 W HCPCS: Performed by: NURSE PRACTITIONER

## 2022-05-08 PROCEDURE — 99900035 HC TECH TIME PER 15 MIN (STAT)

## 2022-05-08 PROCEDURE — 97535 SELF CARE MNGMENT TRAINING: CPT

## 2022-05-08 PROCEDURE — 99232 SBSQ HOSP IP/OBS MODERATE 35: CPT | Mod: 25,,, | Performed by: PODIATRIST

## 2022-05-08 PROCEDURE — 25000003 PHARM REV CODE 250: Performed by: INTERNAL MEDICINE

## 2022-05-08 PROCEDURE — 87040 BLOOD CULTURE FOR BACTERIA: CPT | Performed by: STUDENT IN AN ORGANIZED HEALTH CARE EDUCATION/TRAINING PROGRAM

## 2022-05-08 PROCEDURE — 87205 SMEAR GRAM STAIN: CPT | Performed by: PODIATRIST

## 2022-05-08 PROCEDURE — 97530 THERAPEUTIC ACTIVITIES: CPT

## 2022-05-08 PROCEDURE — 97166 OT EVAL MOD COMPLEX 45 MIN: CPT

## 2022-05-08 PROCEDURE — 87077 CULTURE AEROBIC IDENTIFY: CPT | Performed by: PODIATRIST

## 2022-05-08 PROCEDURE — 27000221 HC OXYGEN, UP TO 24 HOURS

## 2022-05-08 PROCEDURE — 21400001 HC TELEMETRY ROOM

## 2022-05-08 PROCEDURE — 86140 C-REACTIVE PROTEIN: CPT | Performed by: NURSE PRACTITIONER

## 2022-05-08 PROCEDURE — 99232 PR SUBSEQUENT HOSPITAL CARE,LEVL II: ICD-10-PCS | Mod: 25,,, | Performed by: PODIATRIST

## 2022-05-08 PROCEDURE — 87070 CULTURE OTHR SPECIMN AEROBIC: CPT | Performed by: PODIATRIST

## 2022-05-08 PROCEDURE — 28003 TREATMENT OF FOOT INFECTION: CPT | Mod: ,,, | Performed by: PODIATRIST

## 2022-05-08 PROCEDURE — 99232 PR SUBSEQUENT HOSPITAL CARE,LEVL II: ICD-10-PCS | Mod: ,,, | Performed by: STUDENT IN AN ORGANIZED HEALTH CARE EDUCATION/TRAINING PROGRAM

## 2022-05-08 PROCEDURE — 87147 CULTURE TYPE IMMUNOLOGIC: CPT | Performed by: PODIATRIST

## 2022-05-08 PROCEDURE — 87186 SC STD MICRODIL/AGAR DIL: CPT | Performed by: PODIATRIST

## 2022-05-08 PROCEDURE — 94761 N-INVAS EAR/PLS OXIMETRY MLT: CPT

## 2022-05-08 PROCEDURE — 36415 COLL VENOUS BLD VENIPUNCTURE: CPT | Performed by: NURSE PRACTITIONER

## 2022-05-08 PROCEDURE — 28003 PR DEEP DISSEC FOOT INFEC,MULTIPLE: ICD-10-PCS | Mod: ,,, | Performed by: PODIATRIST

## 2022-05-08 PROCEDURE — 36415 COLL VENOUS BLD VENIPUNCTURE: CPT | Performed by: STUDENT IN AN ORGANIZED HEALTH CARE EDUCATION/TRAINING PROGRAM

## 2022-05-08 RX ADMIN — BENZONATATE 100 MG: 100 CAPSULE ORAL at 08:05

## 2022-05-08 RX ADMIN — DAPTOMYCIN 500 MG: 500 INJECTION, POWDER, LYOPHILIZED, FOR SOLUTION INTRAVENOUS at 11:05

## 2022-05-08 RX ADMIN — MUPIROCIN: 20 OINTMENT TOPICAL at 09:05

## 2022-05-08 RX ADMIN — INSULIN ASPART 2 UNITS: 100 INJECTION, SOLUTION INTRAVENOUS; SUBCUTANEOUS at 06:05

## 2022-05-08 RX ADMIN — PIPERACILLIN AND TAZOBACTAM 3.38 G: 3; .375 INJECTION, POWDER, LYOPHILIZED, FOR SOLUTION INTRAVENOUS; PARENTERAL at 12:05

## 2022-05-08 RX ADMIN — HYDROCODONE BITARTRATE AND ACETAMINOPHEN 1 TABLET: 5; 325 TABLET ORAL at 08:05

## 2022-05-08 RX ADMIN — ATORVASTATIN CALCIUM 80 MG: 40 TABLET, FILM COATED ORAL at 09:05

## 2022-05-08 RX ADMIN — CALCIUM ACETATE 1334 MG: 667 CAPSULE ORAL at 12:05

## 2022-05-08 RX ADMIN — ACETAMINOPHEN 650 MG: 325 TABLET, FILM COATED ORAL at 02:05

## 2022-05-08 RX ADMIN — ASPIRIN 81 MG: 81 TABLET, COATED ORAL at 08:05

## 2022-05-08 RX ADMIN — THERA TABS 1 TABLET: TAB at 08:05

## 2022-05-08 RX ADMIN — MUPIROCIN: 20 OINTMENT TOPICAL at 08:05

## 2022-05-08 RX ADMIN — INSULIN ASPART 2 UNITS: 100 INJECTION, SOLUTION INTRAVENOUS; SUBCUTANEOUS at 08:05

## 2022-05-08 RX ADMIN — CALCIUM ACETATE 1334 MG: 667 CAPSULE ORAL at 06:05

## 2022-05-08 RX ADMIN — INSULIN DETEMIR 20 UNITS: 100 INJECTION, SOLUTION SUBCUTANEOUS at 09:05

## 2022-05-08 RX ADMIN — AZELASTINE HYDROCHLORIDE 137 MCG: 137 SPRAY, METERED NASAL at 08:05

## 2022-05-08 RX ADMIN — FLUTICASONE PROPIONATE 100 MCG: 50 SPRAY, METERED NASAL at 08:05

## 2022-05-08 RX ADMIN — METOPROLOL TARTRATE 25 MG: 25 TABLET, FILM COATED ORAL at 08:05

## 2022-05-08 RX ADMIN — CALCIUM ACETATE 1334 MG: 667 CAPSULE ORAL at 08:05

## 2022-05-08 RX ADMIN — OXYCODONE HYDROCHLORIDE AND ACETAMINOPHEN 500 MG: 500 TABLET ORAL at 08:05

## 2022-05-08 RX ADMIN — INSULIN ASPART 2 UNITS: 100 INJECTION, SOLUTION INTRAVENOUS; SUBCUTANEOUS at 09:05

## 2022-05-08 RX ADMIN — METOPROLOL TARTRATE 25 MG: 25 TABLET, FILM COATED ORAL at 09:05

## 2022-05-08 RX ADMIN — AZELASTINE HYDROCHLORIDE 137 MCG: 137 SPRAY, METERED NASAL at 09:05

## 2022-05-08 NOTE — PT/OT/SLP EVAL
Occupational Therapy   Evaluation    Name: Leanna García  MRN: [id number]  Admitting Diagnosis:  Bacteremia  Recent Surgery: * No surgery found *      Recommendations:     Discharge Recommendations:  (LTAC vs SNF)  Discharge Equipment Recommendations:   (TBD)  Barriers to discharge:  Decreased caregiver support    Assessment:     Leanna García is a 56 y.o. female with a medical diagnosis of Bacteremia.  She presents with general weakness. Performance deficits affecting function: weakness, impaired endurance, impaired self care skills, impaired functional mobilty, gait instability, impaired balance, decreased lower extremity function, decreased safety awareness, pain, impaired cardiopulmonary response to activity.      Rehab Prognosis: Fair; patient would benefit from acute skilled OT services to address these deficits and reach maximum level of function.       Plan:     Patient to be seen 5 x/week to address the above listed problems via self-care/home management, therapeutic activities, therapeutic exercises  · Plan of Care Expires: 06/08/22  · Plan of Care Reviewed with: patient, spouse    Subjective     Chief Complaint: left foot pain  Patient/Family Comments/goals: improved functional mobility, transfer and ADL independence    Occupational Profile:  Living Environment: lives with spouse in 1 story home with 5 steps to enter  Previous level of function: modified independent using a rollator in the home, performing self cares, but has been falling over the past 1-2 weeks. Left foot wound has progressed to the point of NWB in LLE. Patient is having difficulty ambulating using her rollator with 1 leg and performing functional transfers.   Roles and Routines: limited homemaker  Equipment Used at Home:  rollator, shower chair  Assistance upon Discharge: Spouse, limited physical assistance.    Pain/Comfort:  · Pain Rating 1: 8/10  · Location - Side 1: Left  · Location 1: foot  · Pain Addressed 1: Reposition,  Distraction  · Pain Rating Post-Intervention 1: 8/10    Patients cultural, spiritual, Advent conflicts given the current situation: no    Objective:     Communicated with: nurse prior to session.  Patient found HOB elevated with telemetry, peripheral IV, wound vac, pulse ox (continuous), oxygen upon OT entry to room.    General Precautions: Standard, fall   Orthopedic Precautions:LLE weight bearing as tolerated   Braces: N/A  Respiratory Status: Nasal cannula, flow 2 L/min    Occupational Performance:    Bed Mobility:    · Patient completed Scooting/Bridging with minimum assistance  · Patient completed Supine to Sit with minimum assistance  · Patient completed Sit to Supine with minimum assistance   · Performed unsupported sitting EOB with contact guard assistance.    Functional Mobility/Transfers:  · Patient completed Sit <> Stand x 1 trial with moderate assistance  with  hand-held assist and rolling walker     Activities of Daily Living:  · Lower Body Dressing: minimum assistance to don/doff socks on right foot sitting EOB.    Cognitive/Visual Perceptual:  Cognitive/Psychosocial Skills:     -       Oriented to: Person, Place and Situation   -       Follows Commands/attention:Follows two-step commands  -       Communication: clear/fluent  -       Memory: No Deficits noted  -       Safety awareness/insight to disability: impaired   -       Mood/Affect/Coping skills/emotional control: Flat affect  Visual/Perceptual:      -Intact Acuity    Physical Exam:  Balance:    -       Sitting: Contact Guard, Standing: Moderate Assit  Upper Extremity Range of Motion:     -       Right Upper Extremity: WFL  -       Left Upper Extremity: WFL  Upper Extremity Strength:    -       Right Upper Extremity: 3+/5  -       Left Upper Extremity: 3+/5   Strength:    -       Right Upper Extremity: fair  -       Left Upper Extremity: fair  Fine Motor Coordination:    -       Intact    AMPAC 6 Click ADL:  AMPAC Total Score:  16    Treatment & Education:  Patient and spouse educated on the purpose of Occupational Therapy.   Education:    Patient left HOB elevated with all lines intact, call button in reach and bed alarm on    GOALS:   Multidisciplinary Problems     Occupational Therapy Goals        Problem: Occupational Therapy    Goal Priority Disciplines Outcome Interventions   Occupational Therapy Goal     OT, PT/OT     Description: Goals to be met by: discharge     Patient will increase functional independence with ADLs by performing:    UE Dressing with Supervision.  LE Dressing with Supervision.  Grooming while seated with Supervision.  Toileting from toilet with Supervision for hygiene and clothing management.   Supine to sit with Supervision.  Toilet transfer to toilet with Supervision.  Perform sitting/standing ADL activity while maintaining LLE NWB with no verbal cues.                     History:     Past Medical History:   Diagnosis Date    A-fib     resolved per patient    Arthritis     Bronchitis     Cardiovascular event risk, ASCVD 10-year risk 6.7% 10/15/2016    Diabetes mellitus     Diabetes mellitus type II     Diabetic ulcer of left midfoot 5/5/2022    Disorder of kidney and ureter     Encounter for blood transfusion     ESRD (end stage renal disease) 5/18/2018    MANJINDER (generalized anxiety disorder) 10/25/2016    History of colon polyps 11/02/2016    Hyperlipidemia     Hypertension     Stroke        Past Surgical History:   Procedure Laterality Date    COLONOSCOPY N/A 10/5/2016    Procedure: COLONOSCOPY;  Surgeon: Pillo Chanel MD;  Location: [facility] ENDO;  Service: Endoscopy;  Laterality: N/A;    COLONOSCOPY N/A 4/26/2022    Procedure: COLONOSCOPY;  Surgeon: Saravanan Rachel MD;  Location: [facility] ENDO;  Service: Endoscopy;  Laterality: N/A;    HERNIA REPAIR Bilateral 11/22/2016    inguinal    HYSTERECTOMY      OOPHORECTOMY         Time Tracking:     OT Date of Treatment: 05/08/22  OT Start Time:  0913  OT Stop Time: 0935  OT Total Time (min): 22 min    Billable Minutes:Evaluation 7  Self Care/Home Management 15    5/8/2022

## 2022-05-08 NOTE — ASSESSMENT & PLAN NOTE
Wound care  MRI r/o osteomyelitis  WBC scan showed medial left foot as source of infection  S/p Incision/drainage/debridement of  Left foot deep tissue abscess below fascia muscle and tendon

## 2022-05-08 NOTE — PT/OT/SLP PROGRESS
Physical Therapy Treatment    Patient Name:  Leanna García   MRN:  [id number]    Recommendations:     Discharge Recommendations:  nursing facility, skilled   Discharge Equipment Recommendations: wheelchair   Barriers to discharge: Inaccessible home and woundcare    Assessment:     Leanna García is a 56 y.o. female admitted with a medical diagnosis of Bacteremia.  She presents with the following impairments/functional limitations:  weakness, impaired functional mobilty, decreased lower extremity function, decreased upper extremity function, gait instability, pain, impaired skin, orthopedic precautions, impaired balance . Patient has 5 steps to enter home. Had had wound care prior to PT visit and wound vac not yet situated. Noted mild presence of blood on sheets..    Rehab Prognosis: Good; patient would benefit from acute skilled PT services to address these deficits and reach maximum level of function.    Recent Surgery: * No surgery found *      Plan:     During this hospitalization, patient to be seen daily to address the identified rehab impairments via gait training, therapeutic activities, therapeutic exercises, wheelchair management/training and progress toward the following goals:    · Plan of Care Expires:  06/07/22    Subjective     Chief Complaint: pain to left foot  Patient/Family Comments/goals: sleepy  Pain/Comfort:  · Pain Rating 1: 7/10  · Location - Side 1: Left  · Location 1: foot  · Pain Addressed 1: Distraction, Nurse notified  · Pain Rating Post-Intervention 1: 6/10      Objective:     Communicated with nurse Chavez prior to session.  Patient found supine with telemetry upon PT entry to room.     General Precautions: Standard, fall   Orthopedic Precautions:LLE non weight bearing   Braces: N/A  Respiratory Status: Nasal cannula, flow 2 L/min     Functional Mobility:  · Bed Mobility:     · Rolling Left:  minimum assistance  · Supine to Sit: minimum assistance  · Transfers:     · Sit to Stand:   moderate assistance with rolling walker  · Bed to Chair: maximal assistance with  hand-held assist  using  Stand Pivot  · Gait: unable.  · Balance: good sitting; Use of RW for unilateral stance to right.    Therapeutic Activities and Exercises:   Patient seen for bed mobility and OOB transfers. Minimal assist rolling supine to sit and sit to stand using RW with bed elevated. Needs maximum assist stand pivot transfers HHA to minimize incidental WB to LLE on RW as she trembles from using BUE's.    Patient left up in chair with all lines intact, call button in reach, nurse notified and  present..    GOALS:   Multidisciplinary Problems     Physical Therapy Goals        Problem: Physical Therapy    Goal Priority Disciplines Outcome Goal Variances Interventions   Physical Therapy Goal     PT, PT/OT Ongoing, Progressing     Description: Goals to be met by: 22     Patient will increase functional independence with mobility by performin. Supine to sit with Modified [place]  2. Sit to supine with Modified [place]  3. Sit to stand transfer with Contact Guard Assistance  4. Bed to chair transfer with Contact Guard Assistance using Rolling Walker maintaining NWB L LE  5. Wheelchair propulsion x150 feet with Supervision using bilateral uppper extremities                     Time Tracking:     PT Received On: 22  PT Start Time: 1105     PT Stop Time: 1120  PT Total Time (min): 15 min     Billable Minutes: Therapeutic Activity 15    Treatment Type: Treatment  PT/PTA: PT           2022

## 2022-05-08 NOTE — PLAN OF CARE
Problem: Physical Therapy  Goal: Physical Therapy Goal  Description: Goals to be met by: 22     Patient will increase functional independence with mobility by performin. Supine to sit with Modified [place]  2. Sit to supine with Modified [place]  3. Sit to stand transfer with Contact Guard Assistance  4. Bed to chair transfer with Contact Guard Assistance using Rolling Walker maintaining NWB L LE  5. Wheelchair propulsion x150 feet with Supervision using bilateral uppper extremities    Outcome: Ongoing, Progressing   Patient seen for bed mobility and Oob transfers to facilitate improved mobility and function.

## 2022-05-08 NOTE — SUBJECTIVE & OBJECTIVE
Subjective:     Interval History:  Patient resting in bed.  Discussed with patient results of white blood cell labeled bone scan.  Foot does appear to be the source of infection.    Patient's  is bedside today.  Patient also appears to be a more alert.  Patient's  relates that patient has been hiding this wound for several months.  Patient states she was embarrassed and therefore did not seek treatment.  Patient admits that she knew her foot was worsening as she began to experience pain with walking.  She states she continued to avoid treatment and patient's  states she did not show him the wound until she knew was time to go to the hospital.      Wound VAC in place.  Will remove for bedside procedure.        COMPARISON: Left foot MRI 5/5/2022    FINDINGS:    Anterior and posterior whole-body planar imaging with spot views of the left foot show physiologic radiopharmaceutical distribution throughout the liver, spleen, and bone marrow.      Diffuse abnormal radiopharmaceutical distribution occurs along medial left foot, with elongated distribution.      IMPRESSION:    Abnormal indium-111 WBC uptake along medial left foot suggesting source of infection.        Scheduled Meds:   ascorbic acid (vitamin C)  500 mg Oral Daily    aspirin  81 mg Oral Daily    atorvastatin  80 mg Oral QHS    azelastine  1 spray Nasal BID    calcium acetate(phosphat bind)  1,334 mg Oral TID WM    cetirizine  10 mg Oral Every other day    collagenase   Topical (Top) Daily    DAPTOmycin (CUBICIN)  IV  500 mg Intravenous Q48H    [START ON 5/9/2022] epoetin chris-epbx  100 Units/kg Intravenous Every Mon, Wed, Fri    fluticasone propionate  2 spray Each Nostril Daily    insulin detemir U-100  20 Units Subcutaneous QHS    metoprolol tartrate  25 mg Oral BID    multivitamin  1 tablet Oral Daily    mupirocin   Nasal BID    piperacillin-tazobactam (ZOSYN) IVPB  3.375 g Intravenous Q12H     Continuous Infusions:  PRN  Meds:acetaminophen, benzonatate, dextrose 50%, dextrose 50%, guaiFENesin-codeine 100-10 mg/5 ml, HYDROcodone-acetaminophen, insulin aspart U-100, melatonin, naloxone, ondansetron, polyethylene glycol, sodium chloride 0.9%    Review of Systems  Objective:     Vital Signs (Most Recent):  Temp: 97.6 °F (36.4 °C) (05/08/22 0828)  Pulse: 67 (05/08/22 0828)  Resp: 20 (05/08/22 0817)  BP: (!) 158/67 (05/08/22 0828)  SpO2: 96 % (05/08/22 0828)   Vital Signs (24h Range):  Temp:  [97.6 °F (36.4 °C)-101.9 °F (38.8 °C)] 97.6 °F (36.4 °C)  Pulse:  [56-84] 67  Resp:  [15-20] 20  SpO2:  [91 %-96 %] 96 %  BP: ()/(50-67) 158/67     Weight: 89 kg (196 lb 3.4 oz)  Body mass index is 30.73 kg/m².    Foot Exam    Pre debridement pictures below.  Post debridement pictures and mid debridement pictures below this as well.  During debridement there was an excessive amount of purulent drainage present- I took pictures mid debridement to show the drainage coming out of her foot.   I was able to drain copious amounts and sent for culture.  Excessive necrotic nonviable tissue consisting of ligament and tendon was also debrided.              Post debridement and mid debridement pictures below                       Laboratory:  All pertinent labs reviewed within the last 24 hours.    Diagnostic Results:  I have reviewed all pertinent imaging results/findings within the past 24 hours.

## 2022-05-08 NOTE — PROGRESS NOTES
[facility]  Podiatry  Progress Note    Patient Name: Leanna García  MRN: [id number]  Admission Date: 5/4/2022  Hospital Length of Stay: 4 days  Attending Physician: Usman Stacy MD  Primary Care Provider: Stefano Lopez MD     Subjective:     Interval History:  Patient resting in bed.  Discussed with patient results of white blood cell labeled bone scan.  Foot does appear to be the source of infection.    Patient's  is bedside today.  Patient also appears to be a more alert.  Patient's  relates that patient has been hiding this wound for several months.  Patient states she was embarrassed and therefore did not seek treatment.  Patient admits that she knew her foot was worsening as she began to experience pain with walking.  She states she continued to avoid treatment and patient's  states she did not show him the wound until she knew was time to go to the hospital.      Wound VAC in place.  Will remove for bedside procedure.        COMPARISON: Left foot MRI 5/5/2022    FINDINGS:    Anterior and posterior whole-body planar imaging with spot views of the left foot show physiologic radiopharmaceutical distribution throughout the liver, spleen, and bone marrow.      Diffuse abnormal radiopharmaceutical distribution occurs along medial left foot, with elongated distribution.      IMPRESSION:    Abnormal indium-111 WBC uptake along medial left foot suggesting source of infection.        Scheduled Meds:   ascorbic acid (vitamin C)  500 mg Oral Daily    aspirin  81 mg Oral Daily    atorvastatin  80 mg Oral QHS    azelastine  1 spray Nasal BID    calcium acetate(phosphat bind)  1,334 mg Oral TID WM    cetirizine  10 mg Oral Every other day    collagenase   Topical (Top) Daily    DAPTOmycin (CUBICIN)  IV  500 mg Intravenous Q48H    [START ON 5/9/2022] epoetin chris-epbx  100 Units/kg Intravenous Every Mon, Wed, Fri    fluticasone propionate  2 spray Each Nostril Daily    insulin  detemir U-100  20 Units Subcutaneous QHS    metoprolol tartrate  25 mg Oral BID    multivitamin  1 tablet Oral Daily    mupirocin   Nasal BID    piperacillin-tazobactam (ZOSYN) IVPB  3.375 g Intravenous Q12H     Continuous Infusions:  PRN Meds:acetaminophen, benzonatate, dextrose 50%, dextrose 50%, guaiFENesin-codeine 100-10 mg/5 ml, HYDROcodone-acetaminophen, insulin aspart U-100, melatonin, naloxone, ondansetron, polyethylene glycol, sodium chloride 0.9%    Review of Systems  Objective:     Vital Signs (Most Recent):  Temp: 97.6 °F (36.4 °C) (05/08/22 0828)  Pulse: 67 (05/08/22 0828)  Resp: 20 (05/08/22 0817)  BP: (!) 158/67 (05/08/22 0828)  SpO2: 96 % (05/08/22 0828)   Vital Signs (24h Range):  Temp:  [97.6 °F (36.4 °C)-101.9 °F (38.8 °C)] 97.6 °F (36.4 °C)  Pulse:  [56-84] 67  Resp:  [15-20] 20  SpO2:  [91 %-96 %] 96 %  BP: ()/(50-67) 158/67     Weight: 89 kg (196 lb 3.4 oz)  Body mass index is 30.73 kg/m².    Foot Exam    Pre debridement pictures below.  Post debridement pictures and mid debridement pictures below this as well.  During debridement there was an excessive amount of purulent drainage present- I took pictures mid debridement to show the drainage coming out of her foot.   I was able to drain copious amounts and sent for culture.  Excessive necrotic nonviable tissue consisting of ligament and tendon was also debrided.              Post debridement and mid debridement pictures below                       Laboratory:  All pertinent labs reviewed within the last 24 hours.    Diagnostic Results:  I have reviewed all pertinent imaging results/findings within the past 24 hours.    Assessment/Plan:     Abscess of left foot  Bedside debridement and incision and drainage below fascia with copious amounts of purulent drainage evacuated out of the foot.  Please see procedure note.  Culture of purulent drainage and deep tissue culture within the purulent drainage taken for culture  analysis        Diabetic ulcer of left midfoot  Bedside debridement and incision and drainage below fascia with copious amounts of purulent drainage evacuated out of the foot.  Please see procedure note.     Wound VAC changes twice weekly to both wounds on left foot.    Indium-labeled WBC scan shows foot as source of infection    Wilman ordered for nutrition for wound healing- patient states she is not drinking due to not liking the taste.  Following me explaining the importance of the drink for wound healing she states she will begin drinking it    Nonweightbearing to left foot     Counseled patient on increasing protein intake, not getting wound wet, keeping dressing clean dry and intact, following a healthy diet, removing pressure from wound      Recommend LTAC following discharge for IV antibiotics and wound care with wound VAC        Alivia Bruno DPM  Podiatry  [facility]   regular/Pt reports adequate appetite and PO intake % meals in house and mother occasionally bringing food from home. no further nutrition intervention at this time.

## 2022-05-08 NOTE — ASSESSMENT & PLAN NOTE
S/p Incision/drainage/debridement of  Left foot deep tissue abscess below fascia muscle and tendon

## 2022-05-08 NOTE — SUBJECTIVE & OBJECTIVE
Interval History:     Review of Systems   Constitutional:  Positive for fever. Negative for activity change and appetite change.   HENT:  Negative for congestion and dental problem.    Eyes:  Negative for discharge and itching.   Respiratory:  Negative for shortness of breath.    Cardiovascular:  Negative for chest pain.   Gastrointestinal:  Negative for abdominal distention and abdominal pain.   Endocrine: Negative for cold intolerance.   Genitourinary:  Negative for difficulty urinating and dysuria.   Musculoskeletal:  Negative for arthralgias and back pain.   Skin:  Positive for wound. Negative for color change.   Neurological:  Positive for weakness. Negative for dizziness and facial asymmetry.   Hematological:  Negative for adenopathy.   Psychiatric/Behavioral:  Negative for agitation and behavioral problems.    Objective:     Vital Signs (Most Recent):  Temp: 97.6 °F (36.4 °C) (05/08/22 0828)  Pulse: 67 (05/08/22 0828)  Resp: 20 (05/08/22 0817)  BP: (!) 158/67 (05/08/22 0828)  SpO2: 96 % (05/08/22 0828)   Vital Signs (24h Range):  Temp:  [97.6 °F (36.4 °C)-101.9 °F (38.8 °C)] 97.6 °F (36.4 °C)  Pulse:  [56-84] 67  Resp:  [15-20] 20  SpO2:  [91 %-96 %] 96 %  BP: ()/(50-67) 158/67     Weight: 89 kg (196 lb 3.4 oz)  Body mass index is 30.73 kg/m².    Intake/Output Summary (Last 24 hours) at 5/8/2022 1212  Last data filed at 5/8/2022 1010  Gross per 24 hour   Intake 1080 ml   Output --   Net 1080 ml      Physical Exam  Vitals and nursing note reviewed.   Constitutional:       General: She is not in acute distress.  HENT:      Head: Atraumatic.      Right Ear: External ear normal.      Left Ear: External ear normal.      Nose: Nose normal.      Mouth/Throat:      Mouth: Mucous membranes are moist.   Eyes:      General: No scleral icterus.  Cardiovascular:      Rate and Rhythm: Normal rate.   Pulmonary:      Effort: Pulmonary effort is normal.   Abdominal:      General: There is no distension.    Musculoskeletal:      Cervical back: Normal range of motion.      Comments: L foot bandage intact   Skin:     General: Skin is warm.   Neurological:      Mental Status: She is alert and oriented to person, place, and time.       Significant Labs: All pertinent labs within the past 24 hours have been reviewed.  CBC:   Recent Labs   Lab 05/07/22 0359 05/08/22 0723   WBC 15.09* 17.57*   HGB 7.3* 7.0*   HCT 24.3* 23.3*    332     CMP:   Recent Labs   Lab 05/07/22 0358 05/08/22 0723   * 135*   K 4.5 4.4   CL 98 95   CO2 22* 29   * 149*   BUN 44* 71*   CREATININE 7.3* 10.1*   CALCIUM 9.1 9.6   ANIONGAP 14 11   EGFRNONAA 5.7* 3.9*       Significant Imaging: I have reviewed all pertinent imaging results/findings within the past 24 hours.

## 2022-05-08 NOTE — PROGRESS NOTES
[facility] Medicine  Progress Note    Patient Name: Leanna García  MRN: [id number]  Patient Class: IP- Inpatient   Admission Date: 5/4/2022  Length of Stay: 4 days  Attending Physician: Usman Stacy MD  Primary Care Provider: Stefano Lopez MD        Subjective:     Principal Problem:Bacteremia        HPI:  Ms. García is a 56-year-old female with a past medical history of ESRD on HD Monday Wednesday Friday, hypertension, IDDM2, PAF, and chronic cough who presents today with complaints of fever up to 103. It is severe.  It is associated with cough, posttussive vomiting, fatigue, sinus congestion, weakness, and a left foot ulcer.  She denies chest pain, diarrhea, dizziness, loss of consciousness.  In the ED she was noted to have a large left foot ulceration at the plantar surface and on the side of the foot.  CT of abdomen pelvis also reveals possible enteritis and bilateral perinephric fat stranding.  WBCs 21 K, troponin is 0.4 in the setting of ESRD.  She did not go to dialysis today she felt too bad.  Glucose is 446, anion gap 20, bicarb 24.       Overview/Hospital Course:  05/05  Had HD today  MRI r/o osteomyelitis  Afebrile now    05/06  Had febrile episodes overnight  Blood cultures send to [facility] in [facility]  Had dialysis today  Awaiting WBC scan     05/07  WBC scan showed medial left foot infection  C/o extreme fatigue/weakness    05/08  Pt having on and off febrile episodes at night  Pt today had bedside debridement of L foot abscess       Interval History:     Review of Systems   Constitutional:  Positive for fever. Negative for activity change and appetite change.   HENT:  Negative for congestion and dental problem.    Eyes:  Negative for discharge and itching.   Respiratory:  Negative for shortness of breath.    Cardiovascular:  Negative for chest pain.   Gastrointestinal:  Negative for abdominal distention and abdominal pain.   Endocrine: Negative for cold intolerance.   Genitourinary:   Negative for difficulty urinating and dysuria.   Musculoskeletal:  Negative for arthralgias and back pain.   Skin:  Positive for wound. Negative for color change.   Neurological:  Positive for weakness. Negative for dizziness and facial asymmetry.   Hematological:  Negative for adenopathy.   Psychiatric/Behavioral:  Negative for agitation and behavioral problems.    Objective:     Vital Signs (Most Recent):  Temp: 97.6 °F (36.4 °C) (05/08/22 0828)  Pulse: 67 (05/08/22 0828)  Resp: 20 (05/08/22 0817)  BP: (!) 158/67 (05/08/22 0828)  SpO2: 96 % (05/08/22 0828)   Vital Signs (24h Range):  Temp:  [97.6 °F (36.4 °C)-101.9 °F (38.8 °C)] 97.6 °F (36.4 °C)  Pulse:  [56-84] 67  Resp:  [15-20] 20  SpO2:  [91 %-96 %] 96 %  BP: ()/(50-67) 158/67     Weight: 89 kg (196 lb 3.4 oz)  Body mass index is 30.73 kg/m².    Intake/Output Summary (Last 24 hours) at 5/8/2022 1212  Last data filed at 5/8/2022 1010  Gross per 24 hour   Intake 1080 ml   Output --   Net 1080 ml      Physical Exam  Vitals and nursing note reviewed.   Constitutional:       General: She is not in acute distress.  HENT:      Head: Atraumatic.      Right Ear: External ear normal.      Left Ear: External ear normal.      Nose: Nose normal.      Mouth/Throat:      Mouth: Mucous membranes are moist.   Eyes:      General: No scleral icterus.  Cardiovascular:      Rate and Rhythm: Normal rate.   Pulmonary:      Effort: Pulmonary effort is normal.   Abdominal:      General: There is no distension.   Musculoskeletal:      Cervical back: Normal range of motion.      Comments: L foot bandage intact   Skin:     General: Skin is warm.   Neurological:      Mental Status: She is alert and oriented to person, place, and time.       Significant Labs: All pertinent labs within the past 24 hours have been reviewed.  CBC:   Recent Labs   Lab 05/07/22  0359 05/08/22  0723   WBC 15.09* 17.57*   HGB 7.3* 7.0*   HCT 24.3* 23.3*    332     CMP:   Recent Labs   Lab  05/07/22  0358 05/08/22  0723   * 135*   K 4.5 4.4   CL 98 95   CO2 22* 29   * 149*   BUN 44* 71*   CREATININE 7.3* 10.1*   CALCIUM 9.1 9.6   ANIONGAP 14 11   EGFRNONAA 5.7* 3.9*       Significant Imaging: I have reviewed all pertinent imaging results/findings within the past 24 hours.      Assessment/Plan:      * Bacteremia  Gram positive rods on BC X 1 ?Bacillus cereus   Had enteritis/Food poisoning which has been resolved(ate chinese food before onset of symptoms)  Blood culture send to [facility] for Microbial Identification system based on MALDI-TOF mass Spectrometry  Repeat Blood cultures so far normal      Sepsis  Bacillus cereus bacteremia along with L foot abscess       Abscess of left foot  S/p Incision/drainage/debridement of  Left foot deep tissue abscess below fascia muscle and tendon      Diabetic ulcer of left midfoot  Wound care  MRI r/o osteomyelitis  WBC scan showed medial left foot as source of infection  S/p Incision/drainage/debridement of  Left foot deep tissue abscess below fascia muscle and tendon    Chronic anemia  pRBC if needed along with Dialysis sessions      Diabetic foot infection  As above         Foot infection  As above       Cough  Symptomatic management      Acute bronchitis  Symptomatic management      Elevated troponin  Likely demand ischemia in the setting of ESRD        Hypokalemia  Stable     Enteritis  Resolved     Type 2 diabetes mellitus with kidney complication, with long-term current use of insulin  Maintain present regime       ESRD (end stage renal disease)  HD sessions as scheduled       VTE Risk Mitigation (From admission, onward)         Ordered     IP VTE HIGH RISK PATIENT  Once         05/05/22 0003     Place sequential compression device  Until discontinued         05/05/22 0003                Discharge Planning   ERI: 5/10/2022     Code Status: Full Code   Is the patient medically ready for discharge?: No    Reason for patient still in  hospital (select all that apply): Treatment  Discharge Plan A: Home with family                  Usman Stacy MD  Department of Hospital Medicine   [facility]

## 2022-05-08 NOTE — ASSESSMENT & PLAN NOTE
Bedside debridement and incision and drainage below fascia with copious amounts of purulent drainage evacuated out of the foot.  Please see procedure note.  Culture of purulent drainage and deep tissue culture within the purulent drainage taken for culture analysis

## 2022-05-08 NOTE — ASSESSMENT & PLAN NOTE
Gram positive rods on BC X 1 ?Bacillus cereus   Had enteritis/Food poisoning which has been resolved(ate chinese food before onset of symptoms)  Blood culture send to [facility] for Microbial Identification system based on MALDI-TOF mass Spectrometry  Repeat Blood cultures so far normal

## 2022-05-08 NOTE — PROGRESS NOTES
NMConsult Note  Infectious Disease    Reason for Consult:  Bacillus cereus bacteremia, and MRSA osteomyelitis     HPI: Leanna García is a 56 y.o. female known to me from prior consultation in September of 2018, was admitted through the emergency room yesterday with fever 103,   Vomiting of 1 day's duration, and chronic sinus congestion, postnasal drip, cough at least several weeks duration and a foot ulcer on the plantar/medial surface surface of the left foot of probably several months duration.  The vomiting began within a few hours of a meal at a [employer] restaurant.  She did not have any diarrhea In the emergency room she had a CT scan of the abdomen which suggested possible enteritis and bilateral perinephric fat stranding, as well as cholelithiasis, and extensive vascular calcifications.  her blood sugar was 446, white blood cells 21,000, normal lactic acid.  Photographs taken in the emergency room suggest cellulitis of the medial and plantar foot She was started on Zosyn and doxycycline as she has history of vancomycin allergy. .  She was seen by Podiatry , and MRI did not demonstrate any abscess or drainage and wound care was ordered.  She was noted this morning to have difficulty swallowing and was seen by speech therapy.  Today 1 anaerobic bottle has a Gram-positive mariana prompting this consult.    5/6: interim reviewed. Still febrile through last night. Blood culture does have characteristics of Bacillus cereus. This will be sent out to [facility] for MALDI ID. Respiratory pcr panel was negative. She was able to eat solid food this am. She denies abdominal pain. Podiatry debrided her foot this am and submitted cultures and is ordering a WBC Scan. Coughing less. No wheezes.     5/7 (Daren):  Interim reviewed, discussed with Dr Bailon.  Patient seen and examined at bedside, states she does not feel good today, feels like she got something that is making her feel off, not her usual self.  Labile BP  121/58, low-grade fever T-max 102° yesterday, currently 100.8.  Wound cultures from left foot grew Staph aureus, pending sensitivities.  She is currently on Daptomycin, Zosyn and levofloxacin IV.    5/8: Interim reviewed, patient seen and examined at bedside. Feeling significantly better compared to yesterday.  Having lunch at bedside.  Hemodynamically stable, T-max 103° yesterday, currently afebrile.  Labs reviewed, white count 17, PMN 79.2%, no bands.  H&H 7/23.3, platelet count 332. Sodium 135, potassium 4.4.  Status post further debridement at bedside today by Podiatry, large deep tissue abscess seen on the plantar facial below the flexor tendon.  Mild necrosis noted and all nonviable tissue was debrided, cultures in process.  Micro updated, confirmed Bacillus cereus bacteremia, wound culture from left foot grew MRSA.    EXAM & DIAGNOSTICS REVIEWED:  4 in going home   Vitals:     Temp:  [97.6 °F (36.4 °C)-101.9 °F (38.8 °C)]   Temp: 97.6 °F (36.4 °C) (05/08/22 0828)  Pulse: 67 (05/08/22 0828)  Resp: 20 (05/08/22 0817)  BP: (!) 158/67 (05/08/22 0828)  SpO2: 96 % (05/08/22 0828)    Intake/Output Summary (Last 24 hours) at 5/8/2022 0846  Last data filed at 5/8/2022 0400  Gross per 24 hour   Intake 360 ml   Output --   Net 360 ml       General:  In NAD. Alert and attentive, cooperative, comfortable on room air  Eyes:  Anicteric,  EOMI  ENT:  No ulcers, exudates, thrush, nares patent, edentulous  Neck:  Supple,   Lungs: Clear  Heart:  RRR, 2/6 systolic murmur +  Abd:  Soft, NT, mildly distended, normal BS, no masses or organomegaly appreciated.  :  Voids  no flank tenderness  Musc:  Joints without effusion, swelling, erythema, synovitis but she does have lower extremity muscle wasting.  She has Charcot arthropathy with fallen arches bilaterally and an ulceration on the medial arch   Skin:  No rashes   Neuro:Alert, attentive, speech fluent, face symmetric, moves all extremities, no focal weakness.  Minimally  Ambulatory at baseline  Psych:  Calm, cooperative  Lymphatic:        Extrem: Left foot with wound vac in place     No edema, erythema, phlebitis, warm and well perfused  VAD:  Left arm AV fistula  No redness     Isolation: contact - MRSA  Wound:           5/6:   This was debrided full thickness this am    5/8:              General Labs reviewed:  Recent Labs   Lab 05/06/22 0414 05/07/22 0359 05/08/22 0723   WBC 18.00* 15.09* 17.57*   HGB 7.8* 7.3* 7.0*   HCT 24.6* 24.3* 23.3*    319 332       Recent Labs   Lab 05/04/22 2040 05/05/22 0320 05/06/22 0414 05/07/22 0358 05/08/22 0723   *   < > 135* 134* 135*   K 3.2*   < > 4.0 4.5 4.4   CL 84*   < > 94* 98 95   CO2 24   < > 28 22* 29   BUN 51*   < > 33* 44* 71*   CREATININE 9.9*   < > 7.6* 7.3* 10.1*   CALCIUM 8.8   < > 9.2 9.1 9.6   PROT 8.0  --   --   --   --    BILITOT 0.9  --   --   --   --    ALKPHOS 60  --   --   --   --    ALT 11  --   --   --   --    AST 13  --   --   --   --     < > = values in this interval not displayed.     Recent Labs   Lab 05/04/22 2040   CRP 32.78*         Micro:  Microbiology Results (last 7 days)       Procedure Component Value Units Date/Time    Aerobic culture [[id number]]  (Abnormal)  (Susceptibility) Collected: 05/06/22 0753    Order Status: Completed Specimen: Wound from Foot, Left Updated: 05/08/22 0832     Aerobic Bacterial Culture METHICILLIN RESISTANT STAPHYLOCOCCUS AUREUS  Many  Results called to and read back by Kathy Maki RN-BCARD;    05/08/2022  08:32 CJD      Blood culture [[id number]] Collected: 05/08/22 0723    Order Status: Sent Specimen: Blood Updated: 05/08/22 0800    Narrative:      Collection has been rescheduled by SLR at 05/08/2022 06:15 Reason:   Unable to collect  Collection has been rescheduled by RE1 at 05/08/2022 06:55 Reason:   Unable to collect   Collection has been rescheduled by SLR at 05/08/2022 06:15 Reason:   Unable to collect  Collection has been rescheduled by RE1 at  05/08/2022 06:55 Reason:   Unable to collect     Blood culture #2 **CANNOT BE ORDERED STAT** [[id number]] Collected: 05/04/22 2155    Order Status: Completed Specimen: Blood from Peripheral, Antecubital, Right Updated: 05/07/22 2232     Blood Culture, Routine No Growth to date      No Growth to date      No Growth to date      No Growth to date    Culture, ID (Consult) [[id number]] Collected: 05/04/22 2040    Order Status: Completed Specimen: Blood Updated: 05/07/22 1422     Culture, Identification (consult) Bacillus cereus    Blood culture [[id number]] Collected: 05/06/22 1100    Order Status: Completed Specimen: Blood Updated: 05/07/22 1232     Blood Culture, Routine No Growth to date      No Growth to date    Narrative:      Collection has been rescheduled by RE1 at 05/06/2022 08:51 Reason:   Having a scan  Collection has been rescheduled by SLR at 05/06/2022 10:27 Reason:   Unable to collect  Collection has been rescheduled by RE1 at 05/06/2022 10:35 Reason:   Unable to collect  Collection has been rescheduled by RE1 at 05/06/2022 08:51 Reason:   Having a scan  Collection has been rescheduled by SLR at 05/06/2022 10:27 Reason:   Unable to collect  Collection has been rescheduled by RE1 at 05/06/2022 10:35 Reason:   Unable to collect    Culture, Anaerobic [[id number]] Collected: 05/06/22 0753    Order Status: Sent Specimen: Wound from Foot, Left Updated: 05/06/22 0817    Respiratory Infection Panel (PCR), Nasopharyngeal [[id number]] Collected: 05/05/22 1826    Order Status: Completed Specimen: Nasopharyngeal Swab Updated: 05/05/22 2126     Respiratory Infection Panel Source NP swab     Adenovirus Not Detected     Coronavirus 229E, Common Cold Virus Not Detected     Coronavirus HKU1, Common Cold Virus Not Detected     Coronavirus NL63, Common Cold Virus Not Detected     Coronavirus OC43, Common Cold Virus Not Detected     Comment: The Coronavirus strains detected in this test cause the common cold.  These strains are  not the COVID-19 (novel Coronavirus)strain   associated with the respiratory disease outbreak.          SARS-CoV2 (COVID-19) Qualitative PCR Not Detected     Human Metapneumovirus Not Detected     Human Rhinovirus/Enterovirus Not Detected     Influenza A (subtypes H1, H1-2009,H3) Not Detected     Influenza B Not Detected     Parainfluenza Virus 1 Not Detected     Parainfluenza Virus 2 Not Detected     Parainfluenza Virus 3 Not Detected     Parainfluenza Virus 4 Not Detected     Respiratory Syncytial Virus Not Detected     Bordetella Parapertussis ([id number]) Not Detected     Bordetella pertussis (ptxP) Not Detected     Chlamydia pneumoniae Not Detected     Mycoplasma pneumoniae Not Detected     Comment: Respiratory Infection Panel testing performed by Multiplex PCR.       Narrative:      Respiratory Infection Panel source->NP Swab    Blood culture #1 **CANNOT BE ORDERED STAT** [[id number]] Collected: 05/04/22 2040    Order Status: Canceled Specimen: Blood from Peripheral, Antecubital, Right             Imaging Reviewed:   CXR   CT abdomen and pelvis 5/4   1.  Small to moderate amount of fluid in the small intestines with scattered air fluid levels, possibly enteritis 2.  Moderate bilateral perinephric stranding. Correlate with urinalysis to exclude the possibility of pyelonephritis 3.  Gallbladder distention with gallstones     MRI of the left midfoot 5/5  IMPRESSION: 16mm skin ulceration in the medial plantar soft tissues with associated cellulitis. There is no abscess or osteomyelitis   Moderate degenerative changes of the midfoot    NM scan Abnormal indium-111 WBC uptake along medial left foot suggesting source of infection.     Cardiology:   ECHO 5/6/22  The left ventricle is normal in size with normal systolic function.  The estimated ejection fraction is 65%.  Normal left ventricular diastolic function.  Normal right ventricular size with normal right ventricular systolic function.  Mild mitral regurgitation.  All  valves visualized, normal.    IMPRESSION & PLAN     1. Bacillus cereus bacteremia likely in the setting of recent Chinese food ingestion   Blood cultures 5/6 & 5/8 no growth, pending final    2. Left foot ulcer, deep tissue abscess/osteomyelitis status post debridement by Podiatry 5/6 and 5/8, deep pocket of pus drained today   Wound culture 5/6 MRSA    3. ESRD on HD   Nephrology following     Recommendations:  Adequate source control, status post 2nd debridement by Podiatry today at bedside large pocket of pus drained  Continue Daptomycin 500 mg IV q48h for MRSA and Bacillus cereus  Continue Zosyn 4.5 g IV q.12 for now   Follow new cultures   Astelin nasal spray, Antitussives, Bronchodilators   Follow new cultures to continue to guide antibiotic therapy  Patient will benefit from LTAC to continue wound care to left foot with wound VAC and IV antibiotics.    at bedside, states he would like to go to LTAC in a home.  Aspiration precautions   Incentive spirometry     D/w nursing, Dr Stacy, Dr Bruno    Medical Decision Making during this encounter was  [_] Low Complexity  [_] Moderate Complexity  [x  ] High Complexity

## 2022-05-08 NOTE — PROGRESS NOTES
INPATIENT NEPHROLOGY CONSULT   [facility] NEPHROLOGY    Leanna García  05/08/2022    Reason for consultation:    esrd    Chief Complaint:   Chief Complaint   Patient presents with    Fever          History of Present Illness:    Per H and P    Ms. García is a 56-year-old female with a past medical history of ESRD on HD Monday Wednesday Friday, hypertension, IDDM2, PAF, and chronic cough who presents today with complaints of fever up to 103. It is severe.  It is associated with cough, posttussive vomiting, fatigue, sinus congestion, weakness, and a left foot ulcer.  She denies chest pain, diarrhea, dizziness, loss of consciousness.  In the ED she was noted to have a large left foot ulceration at the plantar surface and on the side of the foot.  CT of abdomen pelvis also reveals possible enteritis and bilateral perinephric fat stranding.  WBCs 21 K, troponin is 0.4 in the setting of ESRD.  She did not go to dialysis today she felt too bad.  Glucose is 446, anion gap 20, bicarb 24.     5/5  C/o foot pain.  Mild dyspnea.  No vomiting, chest pain, urinary or bowel complaints.  Weak  5/6  Currently getting wound vac placed.  Has foot pain. No respiratory distress  5/7 still spiking fevers.  Foot pain.  Stopped hd early yesterday  5/8  Tired today.  No vomiting or sob.  Tmax 101.9 at 1900 yesterday    Plan of Care:       Assessment:    esrd  --continue dialysis per routine  --fluid restrict  --renal dose medication per routine  --continue outpt medication  --continue binders with meals     Anemia--worsening  --increase dose of erythropoiesis stimulating agent with renal replacement therapy    Hypertension  --fluid restrict  --low salt diet  --uf with hd  --continue minoxidil and metoprolol     Hypokalemia  --better    Hyponatremia  --fluid restrict  --140 Na dialysate              Thank you for allowing us to participate in this patient's care. We will continue to follow.    Vital Signs:  Temp Readings from Last 3  Encounters:   05/08/22 97.6 °F (36.4 °C) (Oral)   04/26/22 98.2 °F (36.8 °C)   03/10/22 97.3 °F (36.3 °C) (Tympanic)       Pulse Readings from Last 3 Encounters:   05/08/22 67   04/26/22 66   04/05/22 75       BP Readings from Last 3 Encounters:   05/08/22 (!) 158/67   04/28/22 (!) 180/82   04/26/22 (!) 174/66       Weight:  Wt Readings from Last 3 Encounters:   05/08/22 89 kg (196 lb 3.4 oz)   05/06/22 86.6 kg (191 lb)   04/28/22 94 kg (207 lb 5.5 oz)       Past Medical & Surgical History:  Past Medical History:   Diagnosis Date    A-fib     resolved per patient    Arthritis     Bronchitis     Cardiovascular event risk, ASCVD 10-year risk 6.7% 10/15/2016    Diabetes mellitus     Diabetes mellitus type II     Diabetic ulcer of left midfoot 5/5/2022    Disorder of kidney and ureter     Encounter for blood transfusion     ESRD (end stage renal disease) 5/18/2018    MANJINDER (generalized anxiety disorder) 10/25/2016    History of colon polyps 11/02/2016    Hyperlipidemia     Hypertension     Stroke        Past Surgical History:   Procedure Laterality Date    COLONOSCOPY N/A 10/5/2016    Procedure: COLONOSCOPY;  Surgeon: Pillo Chanel MD;  Location: [facility];  Service: Endoscopy;  Laterality: N/A;    COLONOSCOPY N/A 4/26/2022    Procedure: COLONOSCOPY;  Surgeon: Saravanan Rachel MD;  Location: [facility];  Service: Endoscopy;  Laterality: N/A;    HERNIA REPAIR Bilateral 11/22/2016    inguinal    HYSTERECTOMY      OOPHORECTOMY         Past Social History:  Social History     Socioeconomic History    Marital status:    Tobacco Use    Smoking status: Never Smoker    Smokeless tobacco: Never Used   Substance and Sexual Activity    Alcohol use: No    Drug use: No    Sexual activity: Yes     Partners: Male   Social History Narrative    Caregiver      Social Determinants of Health     Financial Resource Strain: Low Risk     Difficulty of Paying Living Expenses: Not very hard   Food  Insecurity: Unknown    Worried About Running Out of Food in the Last Year: Patient refused    Ran Out of Food in the Last Year: Patient refused   Transportation Needs: Unknown    Lack of Transportation (Medical): Patient refused    Lack of Transportation (Non-Medical): Patient refused   Physical Activity: Insufficiently Active    Days of Exercise per Week: 7 days    Minutes of Exercise per Session: 20 min   Stress: No Stress Concern Present    Feeling of Stress : Only a little   Social Connections: Unknown    Frequency of Communication with Friends and Family: Patient refused    Frequency of Social Gatherings with Friends and Family: Patient refused    Active Member of Clubs or Organizations: No    Marital Status:        Medications:  No current facility-administered medications on file prior to encounter.     Current Outpatient Medications on File Prior to Encounter   Medication Sig Dispense Refill    ascorbic acid, vitamin C, (VITAMIN C) 500 MG tablet Take 500 mg by mouth once daily.      aspirin (ECOTRIN) 81 MG EC tablet Take 81 mg by mouth once daily.      atorvastatin (LIPITOR) 80 MG tablet Take 1 tablet (80 mg total) by mouth once daily. (Patient taking differently: Take 80 mg by mouth every evening.) 90 tablet 3    blood sugar diagnostic Strp To check BG 4 times daily, to use with insurance preferred meter (Patient taking differently: 1 strip by Misc.(Non-Drug; Combo Route) route 4 (four) times daily. To check BG 4 times daily, to use with insurance preferred meter) 450 strip 3    insulin aspart U-100 (NOVOLOG FLEXPEN U-100 INSULIN) 100 unit/mL (3 mL) InPn pen Inject 5-8 units 3 times per day with food. 1 Box 6    insulin detemir U-100 (LEVEMIR FLEXTOUCH U-100 INSULN) 100 unit/mL (3 mL) InPn pen Inject 15-18 Units into the skin once daily. 1 Box 6    lancets Misc To use to check blood sugar 4 times daily. To use with insurance approved meter. Patient needs the round, purple one  "(Patient taking differently: 1 lancet by Misc.(Non-Drug; Combo Route) route 4 (four) times daily. To use to check blood sugar 4 times daily. To use with insurance approved meter. Patient needs the round, purple one) 450 each 3    metoprolol tartrate (LOPRESSOR) 25 MG tablet Take 25 mg by mouth 2 (two) times daily.      minoxidiL (LONITEN) 2.5 MG tablet Take 5 mg by mouth 2 (two) times daily.      multivitamin (THERAGRAN) per tablet Take 1 tablet by mouth once daily.      pen needle, diabetic (BD ULTRA-FINE ROBERT PEN NEEDLE) 32 gauge x 5/32" Ndle To use 4 times per day with insulin injections. (Patient taking differently: 1 pen by Misc.(Non-Drug; Combo Route) route 4 (four) times daily. To use 4 times per day with insulin injections.) 450 each 2    sucroferric oxyhydroxide (VELPHORO) 500 mg Chew Take 500 mg by mouth 3 (three) times daily.      dextrose (GLUCOSE GEL) 40 % gel Take 37.5 mLs (15,000 mg total) by mouth once as needed (hypoglycemia). 37.5 g 4    gabapentin (NEURONTIN) 300 MG capsule Take 300 mg by mouth every evening.      glucagon (BAQSIMI) 3 mg/actuation Spry 3 mg (one actuation) into a single nostril; if no response, may repeat in 15 minutes using a new intranasal device. (Patient taking differently: 3 mg by Left Nostril route as needed. 3 mg (one actuation) into a single nostril; if no response, may repeat in 15 minutes using a new intranasal device.) 2 each 1    [DISCONTINUED] blood-glucose meter (ACCU-CHEK KAYLIE PLUS METER) Misc To use to check blood sugars 4 times a day 1 each 0    [DISCONTINUED] clorazepate (TRANXENE) 3.75 MG Tab Take 7.5 mg by mouth nightly as needed.       [DISCONTINUED] fenofibrate 160 MG Tab Take 1 tablet (160 mg total) by mouth once daily. 90 tablet 3    [DISCONTINUED] lancing device with lancets (ACCU-CHEK SOFT DEV LANCETS) Kit To check blood sugars 4 times per day 400 each 3     Scheduled Meds:   ascorbic acid (vitamin C)  500 mg Oral Daily    aspirin  81 mg " "Oral Daily    atorvastatin  80 mg Oral QHS    azelastine  1 spray Nasal BID    calcium acetate(phosphat bind)  1,334 mg Oral TID WM    cetirizine  10 mg Oral Every other day    collagenase   Topical (Top) Daily    DAPTOmycin (CUBICIN)  IV  500 mg Intravenous Q48H    [START ON 5/9/2022] epoetin chris-epbx  100 Units/kg Intravenous Every Mon, Wed, Fri    fluticasone propionate  2 spray Each Nostril Daily    insulin detemir U-100  20 Units Subcutaneous QHS    metoprolol tartrate  25 mg Oral BID    multivitamin  1 tablet Oral Daily    mupirocin   Nasal BID    piperacillin-tazobactam (ZOSYN) IVPB  3.375 g Intravenous Q12H     Continuous Infusions:  PRN Meds:.acetaminophen, benzonatate, dextrose 50%, dextrose 50%, guaiFENesin-codeine 100-10 mg/5 ml, HYDROcodone-acetaminophen, insulin aspart U-100, melatonin, naloxone, ondansetron, polyethylene glycol, sodium chloride 0.9%    Allergies:  Vancomycin and Chlorhexidine    Past Family History:  Reviewed; refer to Hospitalist Admission Note    Review of Systems:  Review of Systems - All 14 systems reviewed and negative, except as noted in HPI    Physical Exam:    BP (!) 158/67 (BP Location: Right arm, Patient Position: Sitting)   Pulse 67   Temp 97.6 °F (36.4 °C) (Oral)   Resp 20   Ht 5' 7" (1.702 m)   Wt 89 kg (196 lb 3.4 oz)   SpO2 96%   BMI 30.73 kg/m²     General Appearance:    Alert, cooperative, no distress, appears stated age   Head:    Normocephalic, without obvious abnormality, atraumatic   Eyes:    PER, conjunctiva/corneas clear, EOM's intact in both eyes        Throat:   Lips, mucosa, and tongue normal; teeth and gums normal   Back:     Symmetric, no curvature, ROM normal, no CVA tenderness   Lungs:     Clear to auscultation bilaterally, respirations unlabored   Chest wall:    No tenderness or deformity   Heart:    Regular rate and rhythm, S1 and S2 normal, no murmur, rub   or gallop   Abdomen:     Soft, non-tender, bowel sounds active all four " quadrants,     no masses, no organomegaly   Extremities:   Extremities normal, atraumatic, no cyanosis or edema   Pulses:   2+ and symmetric all extremities   MSK:   No joint or muscle swelling, tenderness or deformity   Skin:   Skin color, texture, turgor normal, no rashes or lesions   Neurologic:   CNII-XII intact, normal strength and sensation       Throughout.  No flap     Results:  Lab Results   Component Value Date     (L) 05/08/2022    K 4.4 05/08/2022    CL 95 05/08/2022    CO2 29 05/08/2022    BUN 71 (H) 05/08/2022    CREATININE 10.1 (H) 05/08/2022    CALCIUM 9.6 05/08/2022    ANIONGAP 11 05/08/2022    ESTGFRAFRICA 4.4 (A) 05/08/2022    EGFRNONAA 3.9 (A) 05/08/2022       Lab Results   Component Value Date    CALCIUM 9.6 05/08/2022    PHOS 4.2 03/10/2022       Recent Labs   Lab 05/08/22  0723   WBC 17.57*   RBC 2.55*   HGB 7.0*   HCT 23.3*      MCV 91   MCH 27.5   MCHC 30.0*        Imaging Results          X-Ray Foot Complete Left (Final result)  Result time 05/05/22 06:12:52    Final result by Denise Estrada MD (05/05/22 06:12:52)                 Narrative:    Three views of the left foot are compared to prior study dated 12/3/2021    Clinical history is infection    There is osteopenia. There has been prior amputation of the fourth digit at the base of the proximal phalanx.    There are moderate degenerative changes of the midfoot and hindfoot with joint space narrowing and spurring.. There are no fractures, dislocations or osteolysis. There is a subcutaneous ulceration the mid plantar soft tissues. There is moderate peripheral vascular calcification.    IMPRESSION: Prior amputation of fourth digit without radiographic evidence for osteomyelitis.    Moderate degenerative changes of the foot    16mm soft tissue ulceration in the mid plantar medial soft tissues    Electronically signed by:  Denise Estrada MD  5/5/2022 6:12 AM CDT Workstation: [id number]                             CT  Abdomen Pelvis  Without Contrast (Final result)  Result time 05/04/22 21:53:02    Final result by Ernesto Lino MD (05/04/22 21:53:02)                 Narrative:    EXAM: CT ABDOMEN PELVIS WITHOUT CONTRAST    RadLex: CT ABDOMEN PELVIS WITHOUT IV CONTRAST    HISTORY: 56 years  Female;  Abdominal pain, acute, nonlocalized;    TECHNIQUE:   Standard CT of the abdomen and pelvis was performed without the use of intravenous contrast media. Lack of intravenous contrast limits evaluation of soft tissue structures in this study.    CT scans at this facility use dose modulation, iterative reconstruction and/or weight based dosing when appropriate to reduce radiation dose to as low as reasonably achievable.    COMPARISON: None available    FINDING(S):    ABDOMEN:    Lung bases show dependent atelectasis. Coronary artery calcifications are seen. Cardiac size normal. Mild thickening of the distal esophagus with small hiatal hernia.    Small amount of fluid is seen in the stomach. Mild thickening of the stomach lining.    Mild hepatic steatosis. Spleen, pancreas, and both adrenals are normal.    There is gallbladder distention with multiple gallstones.    Neither kidney shows hydronephrosis. There is moderate bilateral perinephric stranding which appears above expected and may represent inflammation. Correlate with urinalysis to exclude the possibility of pyelonephritis.    There are vascular calcifications involving both kidneys. There may be separate small stones as well. The ureters themselves are decompressed.    Marked atherosclerosis affects the aorta and the major branch vessels. No abdominal lymphadenopathy or free abdominal fluid.    The small intestines contain a small to moderate amount of fluid with scattered air-fluid levels. No transition point to suggest obstruction.    No evidence of appendicitis. Oral contrast is seen in the intestines. There is a mild to moderate stool burden. Scattered colonic  diverticulosis.    PELVIS:    Bladder is decompressed. No free fluid within the pelvis. There is an enlarged left external iliac lymph node measuring 1.6 x 0.8 cm on series 3, image 210.    No other groin or pelvis lymphadenopathy. A small left inguinal hernia contains only fat.    Small umbilical hernia contains only fat.    There are degenerative changes in the lower lumbar spine.    IMPRESSION:    1.  Small to moderate amount of fluid in the small intestines with scattered air fluid levels, possibly enteritis  2.  Moderate bilateral perinephric stranding. Correlate with urinalysis to exclude the possibility of pyelonephritis  3.  Gallbladder distention with gallstones    Electronically signed by:  Ernesto Lino MD  5/4/2022 9:53 PM CDT Workstation: [id number]                             X-Ray Chest 1 View (Final result)  Result time 05/05/22 06:10:56    Final result by Denise Estrada MD (05/05/22 06:10:56)                 Narrative:    XR CHEST 1 VIEW    CLINICAL HISTORY:  56 years Female pain    COMPARISON: 5/19/2021    FINDINGS: Cardiomediastinal silhouette is within normal limits. Lungs are normally expanded with no airspace consolidation. No pleural effusion or pneumothorax. No acute osseous abnormality.    IMPRESSION: No acute pulmonary process.    Electronically signed by:  Denise Estrada MD  5/5/2022 6:10 AM CDT Workstation: [id number]                                I have personally reviewed pertinent radiological imaging and reports.     Patient care was time spent personally by me on the following activities:   · Obtaining a history  · Examination of patient.  · Providing medical care at the patients bedside.  · Developing a treatment plan with patient or surrogate and bedside caregivers  · Ordering and reviewing laboratory studies, radiographic studies, pulse oximetry.  · Ordering and performing treatments and interventions.  · Evaluation of patient's response to treatment.  · Discussions  with consultants while on the unit and immediately available to the patient.  · Re-evaluation of the patient's condition.  · Documentation in the medical record.     Salvador Carlson MD  Nephrology  [facility] [place]  [phone]

## 2022-05-08 NOTE — CARE UPDATE
05/08/22 0806   PRE-TX-O2   O2 Device (Oxygen Therapy) nasal cannula   $ Is the patient on Low Flow Oxygen? Yes   Flow (L/min) 2   SpO2 (!) 91 %   Pulse Oximetry Type Intermittent   $ Pulse Oximetry - Multiple Charge Pulse Oximetry - Multiple   Pulse 65   Resp 15   Respiratory Evaluation   $ Care Plan Tech Time 15 min

## 2022-05-09 LAB
ABO + RH BLD: NORMAL
ANION GAP SERPL CALC-SCNC: 16 MMOL/L (ref 8–16)
BACTERIA BLD CULT: ABNORMAL
BACTERIA BLD CULT: NORMAL
BASOPHILS # BLD AUTO: 0.07 K/UL (ref 0–0.2)
BASOPHILS NFR BLD: 0.4 % (ref 0–1.9)
BLD GP AB SCN CELLS X3 SERPL QL: NORMAL
BLD PROD TYP BPU: NORMAL
BLOOD UNIT EXPIRATION DATE: NORMAL
BLOOD UNIT TYPE CODE: 5100
BLOOD UNIT TYPE: NORMAL
BUN SERPL-MCNC: 89 MG/DL (ref 6–20)
CALCIUM SERPL-MCNC: 9.6 MG/DL (ref 8.7–10.5)
CHLORIDE SERPL-SCNC: 96 MMOL/L (ref 95–110)
CO2 SERPL-SCNC: 21 MMOL/L (ref 23–29)
CODING SYSTEM: NORMAL
CREAT SERPL-MCNC: 11.3 MG/DL (ref 0.5–1.4)
DIFFERENTIAL METHOD: ABNORMAL
DISPENSE STATUS: NORMAL
EOSINOPHIL # BLD AUTO: 0.2 K/UL (ref 0–0.5)
EOSINOPHIL NFR BLD: 1.1 % (ref 0–8)
ERYTHROCYTE [DISTWIDTH] IN BLOOD BY AUTOMATED COUNT: 14.9 % (ref 11.5–14.5)
EST. GFR  (AFRICAN AMERICAN): 3.9 ML/MIN/1.73 M^2
EST. GFR  (NON AFRICAN AMERICAN): 3.4 ML/MIN/1.73 M^2
GLUCOSE SERPL-MCNC: 135 MG/DL (ref 70–110)
GLUCOSE SERPL-MCNC: 144 MG/DL (ref 70–110)
GLUCOSE SERPL-MCNC: 146 MG/DL (ref 70–110)
GLUCOSE SERPL-MCNC: 196 MG/DL (ref 70–110)
GLUCOSE SERPL-MCNC: 242 MG/DL (ref 70–110)
HCT VFR BLD AUTO: 22.1 % (ref 37–48.5)
HGB BLD-MCNC: 6.7 G/DL (ref 12–16)
IMM GRANULOCYTES # BLD AUTO: 0.34 K/UL (ref 0–0.04)
IMM GRANULOCYTES NFR BLD AUTO: 1.7 % (ref 0–0.5)
LYMPHOCYTES # BLD AUTO: 2.4 K/UL (ref 1–4.8)
LYMPHOCYTES NFR BLD: 12.1 % (ref 18–48)
MCH RBC QN AUTO: 27.2 PG (ref 27–31)
MCHC RBC AUTO-ENTMCNC: 30.3 G/DL (ref 32–36)
MCV RBC AUTO: 90 FL (ref 82–98)
MONOCYTES # BLD AUTO: 1.6 K/UL (ref 0.3–1)
MONOCYTES NFR BLD: 8.3 % (ref 4–15)
NEUTROPHILS # BLD AUTO: 15 K/UL (ref 1.8–7.7)
NEUTROPHILS NFR BLD: 76.4 % (ref 38–73)
NRBC BLD-RTO: 0 /100 WBC
NUM UNITS TRANS PACKED RBC: NORMAL
PLATELET # BLD AUTO: 337 K/UL (ref 150–450)
PMV BLD AUTO: 10.4 FL (ref 9.2–12.9)
POTASSIUM SERPL-SCNC: 4.7 MMOL/L (ref 3.5–5.1)
RBC # BLD AUTO: 2.46 M/UL (ref 4–5.4)
SODIUM SERPL-SCNC: 133 MMOL/L (ref 136–145)
WBC # BLD AUTO: 19.57 K/UL (ref 3.9–12.7)

## 2022-05-09 PROCEDURE — 86901 BLOOD TYPING SEROLOGIC RH(D): CPT | Performed by: INTERNAL MEDICINE

## 2022-05-09 PROCEDURE — 36415 COLL VENOUS BLD VENIPUNCTURE: CPT | Performed by: INTERNAL MEDICINE

## 2022-05-09 PROCEDURE — 97530 THERAPEUTIC ACTIVITIES: CPT

## 2022-05-09 PROCEDURE — 97535 SELF CARE MNGMENT TRAINING: CPT

## 2022-05-09 PROCEDURE — 99232 PR SUBSEQUENT HOSPITAL CARE,LEVL II: ICD-10-PCS | Mod: ,,, | Performed by: INTERNAL MEDICINE

## 2022-05-09 PROCEDURE — 63600175 PHARM REV CODE 636 W HCPCS: Performed by: NURSE PRACTITIONER

## 2022-05-09 PROCEDURE — 92526 ORAL FUNCTION THERAPY: CPT

## 2022-05-09 PROCEDURE — 25000003 PHARM REV CODE 250: Performed by: INTERNAL MEDICINE

## 2022-05-09 PROCEDURE — 99900035 HC TECH TIME PER 15 MIN (STAT)

## 2022-05-09 PROCEDURE — 99232 SBSQ HOSP IP/OBS MODERATE 35: CPT | Mod: ,,, | Performed by: INTERNAL MEDICINE

## 2022-05-09 PROCEDURE — 86920 COMPATIBILITY TEST SPIN: CPT | Performed by: INTERNAL MEDICINE

## 2022-05-09 PROCEDURE — P9016 RBC LEUKOCYTES REDUCED: HCPCS | Performed by: INTERNAL MEDICINE

## 2022-05-09 PROCEDURE — 21400001 HC TELEMETRY ROOM

## 2022-05-09 PROCEDURE — 94761 N-INVAS EAR/PLS OXIMETRY MLT: CPT

## 2022-05-09 PROCEDURE — 27000221 HC OXYGEN, UP TO 24 HOURS

## 2022-05-09 PROCEDURE — 80048 BASIC METABOLIC PNL TOTAL CA: CPT | Performed by: NURSE PRACTITIONER

## 2022-05-09 PROCEDURE — 97110 THERAPEUTIC EXERCISES: CPT

## 2022-05-09 PROCEDURE — 99232 SBSQ HOSP IP/OBS MODERATE 35: CPT | Mod: ,,, | Performed by: PODIATRIST

## 2022-05-09 PROCEDURE — 63600175 PHARM REV CODE 636 W HCPCS: Mod: JG | Performed by: INTERNAL MEDICINE

## 2022-05-09 PROCEDURE — 99900031 HC PATIENT EDUCATION (STAT)

## 2022-05-09 PROCEDURE — 85025 COMPLETE CBC W/AUTO DIFF WBC: CPT | Performed by: NURSE PRACTITIONER

## 2022-05-09 PROCEDURE — 25000003 PHARM REV CODE 250: Performed by: NURSE PRACTITIONER

## 2022-05-09 PROCEDURE — 36415 COLL VENOUS BLD VENIPUNCTURE: CPT | Performed by: NURSE PRACTITIONER

## 2022-05-09 PROCEDURE — 90935 HEMODIALYSIS ONE EVALUATION: CPT

## 2022-05-09 PROCEDURE — 36430 TRANSFUSION BLD/BLD COMPNT: CPT

## 2022-05-09 PROCEDURE — 99232 PR SUBSEQUENT HOSPITAL CARE,LEVL II: ICD-10-PCS | Mod: ,,, | Performed by: PODIATRIST

## 2022-05-09 RX ORDER — HYDROCODONE BITARTRATE AND ACETAMINOPHEN 500; 5 MG/1; MG/1
TABLET ORAL
Status: DISCONTINUED | OUTPATIENT
Start: 2022-05-09 | End: 2022-05-19 | Stop reason: HOSPADM

## 2022-05-09 RX ADMIN — EPOETIN ALFA-EPBX 9000 UNITS: 10000 INJECTION, SOLUTION INTRAVENOUS; SUBCUTANEOUS at 06:05

## 2022-05-09 RX ADMIN — CETIRIZINE HYDROCHLORIDE 10 MG: 10 TABLET, FILM COATED ORAL at 09:05

## 2022-05-09 RX ADMIN — ACETAMINOPHEN 650 MG: 325 TABLET, FILM COATED ORAL at 12:05

## 2022-05-09 RX ADMIN — METOPROLOL TARTRATE 25 MG: 25 TABLET, FILM COATED ORAL at 09:05

## 2022-05-09 RX ADMIN — MUPIROCIN: 20 OINTMENT TOPICAL at 10:05

## 2022-05-09 RX ADMIN — THERA TABS 1 TABLET: TAB at 09:05

## 2022-05-09 RX ADMIN — BENZONATATE 100 MG: 100 CAPSULE ORAL at 09:05

## 2022-05-09 RX ADMIN — AZELASTINE HYDROCHLORIDE 137 MCG: 137 SPRAY, METERED NASAL at 09:05

## 2022-05-09 RX ADMIN — POLYETHYLENE GLYCOL 3350 17 G: 17 POWDER, FOR SOLUTION ORAL at 09:05

## 2022-05-09 RX ADMIN — INSULIN ASPART 1 UNITS: 100 INJECTION, SOLUTION INTRAVENOUS; SUBCUTANEOUS at 12:05

## 2022-05-09 RX ADMIN — MUPIROCIN: 20 OINTMENT TOPICAL at 09:05

## 2022-05-09 RX ADMIN — PIPERACILLIN AND TAZOBACTAM 3.38 G: 3; .375 INJECTION, POWDER, LYOPHILIZED, FOR SOLUTION INTRAVENOUS; PARENTERAL at 12:05

## 2022-05-09 RX ADMIN — CALCIUM ACETATE 1334 MG: 667 CAPSULE ORAL at 12:05

## 2022-05-09 RX ADMIN — FLUTICASONE PROPIONATE 100 MCG: 50 SPRAY, METERED NASAL at 09:05

## 2022-05-09 RX ADMIN — INSULIN ASPART 2 UNITS: 100 INJECTION, SOLUTION INTRAVENOUS; SUBCUTANEOUS at 05:05

## 2022-05-09 RX ADMIN — PIPERACILLIN AND TAZOBACTAM 3.38 G: 3; .375 INJECTION, POWDER, LYOPHILIZED, FOR SOLUTION INTRAVENOUS; PARENTERAL at 01:05

## 2022-05-09 RX ADMIN — INSULIN DETEMIR 20 UNITS: 100 INJECTION, SOLUTION SUBCUTANEOUS at 09:05

## 2022-05-09 RX ADMIN — ASPIRIN 81 MG: 81 TABLET, COATED ORAL at 09:05

## 2022-05-09 RX ADMIN — CALCIUM ACETATE 1334 MG: 667 CAPSULE ORAL at 05:05

## 2022-05-09 RX ADMIN — OXYCODONE HYDROCHLORIDE AND ACETAMINOPHEN 500 MG: 500 TABLET ORAL at 09:05

## 2022-05-09 RX ADMIN — METOPROLOL TARTRATE 25 MG: 25 TABLET, FILM COATED ORAL at 10:05

## 2022-05-09 RX ADMIN — CALCIUM ACETATE 1334 MG: 667 CAPSULE ORAL at 09:05

## 2022-05-09 NOTE — SUBJECTIVE & OBJECTIVE
Subjective:     Interval History:  Patient is sitting up in bed.  Appears more alert today.  Dressing intact from incision and drainage yesterday.  Wound VAC to be applied by wound care nursing today        Scheduled Meds:   ascorbic acid (vitamin C)  500 mg Oral Daily    aspirin  81 mg Oral Daily    atorvastatin  80 mg Oral QHS    azelastine  1 spray Nasal BID    calcium acetate(phosphat bind)  1,334 mg Oral TID WM    cetirizine  10 mg Oral Every other day    collagenase   Topical (Top) Daily    DAPTOmycin (CUBICIN)  IV  500 mg Intravenous Q48H    epoetin chris-epbx  100 Units/kg Intravenous Every Mon, Wed, Fri    fluticasone propionate  2 spray Each Nostril Daily    insulin detemir U-100  20 Units Subcutaneous QHS    metoprolol tartrate  25 mg Oral BID    multivitamin  1 tablet Oral Daily    mupirocin   Nasal BID    piperacillin-tazobactam (ZOSYN) IVPB  3.375 g Intravenous Q12H     Continuous Infusions:  PRN Meds:sodium chloride, acetaminophen, benzonatate, dextrose 50%, dextrose 50%, guaiFENesin-codeine 100-10 mg/5 ml, HYDROcodone-acetaminophen, insulin aspart U-100, melatonin, naloxone, ondansetron, polyethylene glycol, sodium chloride 0.9%    Review of Systems  Objective:     Vital Signs (Most Recent):  Temp: 98.7 °F (37.1 °C) (05/09/22 1126)  Pulse: (!) 55 (05/09/22 1126)  Resp: 18 (05/09/22 1126)  BP: (!) 139/59 (05/09/22 1126)  SpO2: 95 % (05/09/22 1126)   Vital Signs (24h Range):  Temp:  [97.7 °F (36.5 °C)-102.9 °F (39.4 °C)] 98.7 °F (37.1 °C)  Pulse:  [52-77] 55  Resp:  [18-20] 18  SpO2:  [85 %-98 %] 95 %  BP: (115-157)/(56-74) 139/59     Weight: 89 kg (196 lb 3.4 oz)  Body mass index is 30.73 kg/m².    Foot Exam      Laboratory:  All pertinent labs reviewed within the last 24 hours.    Diagnostic Results:  I have reviewed all pertinent imaging results/findings within the past 24 hours.  .

## 2022-05-09 NOTE — PLAN OF CARE
Spoke with patient at bedside and  on speaker phone concerning post-acute placement.  Patient and  are agreeable to LTAC placement and give preference to [facility].  I requested backup facility options in case [facility] cannot accept and  states he has a family member in healthcare industry he would like to consult with and he would provide secondary options this afternoon.  [place] liaison for [facility], notified of referral via Secure Chat.        05/09/22 1140   Discharge Reassessment   Assessment Type Discharge Planning Reassessment   Did the patient's condition or plan change since previous assessment? Yes   Discharge Plan discussed with: Spouse/sig other;Patient   Name(s) and Number(s) Donovan García (Spouse)   [phone]   Communicated ERI with patient/caregiver Date not available/Unable to determine   Discharge Plan A Long-term acute care facility (LTAC)   Discharge Plan B Skilled Nursing Facility   DME Needed Upon Discharge  none   Discharge Barriers Identified None   Why the patient remains in the hospital Requires continued medical care   Post-Acute Status   Post-Acute Authorization Placement   Post-Acute Placement Status Referrals Sent   Patient choice form signed by patient/caregiver List with quality metrics by geographic area provided   Discharge Delays None known at this time

## 2022-05-09 NOTE — ASSESSMENT & PLAN NOTE
Wound VAC changes twice weekly to both wounds on left foot.    Wilman ordered for nutrition for wound healing    Nonweightbearing to left foot     Counseled patient on increasing protein intake, not getting wound wet, keeping dressing clean dry and intact, following a healthy diet, removing pressure from wound      Recommend LTAC following discharge for IV antibiotics and wound care with wound VAC

## 2022-05-09 NOTE — PLAN OF CARE
Important Message from Medicare was sign, explained and given to patient/caregiver on 05/09/2022 at 8:50am     addressed any questions or concerns.    Important Message from Medicare document will be scanned into patient's medical record

## 2022-05-09 NOTE — PT/OT/SLP PROGRESS
Physical Therapy Treatment    Patient Name:  Leanna García   MRN:  [id number]    Recommendations:     Discharge Recommendations:  LTACH (long-term acute care hospital), nursing facility, skilled   Discharge Equipment Recommendations: wheelchair   Barriers to discharge: Decreased caregiver support    Assessment:     Leanna García is a 56 y.o. female admitted with a medical diagnosis of Bacteremia.  She presents with the following impairments/functional limitations:  weakness, impaired endurance, impaired self care skills, impaired functional mobilty, gait instability, impaired balance, decreased lower extremity function, orthopedic precautions, impaired cardiopulmonary response to activity, impaired skin .    Rehab Prognosis: Fair; patient would benefit from acute skilled PT services to address these deficits and reach maximum level of function.    Recent Surgery: * No surgery found *      Plan:     During this hospitalization, patient to be seen daily to address the identified rehab impairments via gait training, therapeutic activities, therapeutic exercises and progress toward the following goals:    · Plan of Care Expires:  06/07/22    Subjective     Chief Complaint: Wound Vac had been removed  Patient/Family Comments/goals: wound healing  Pain/Comfort:  · Pain Rating 1: 0/10      Objective:     Communicated with nurse prior to session.  Patient found HOB slightly elevated with   upon PT entry to room.     General Precautions: Standard, contact, fall   Orthopedic Precautions:LLE non weight bearing   Braces:    Respiratory Status: Room air     Functional Mobility:  · Bed Mobility:     · Rolling Left:  minimum assistance  · Rolling Right: minimum assistance  · Supine to Sit: minimum assistance  · Transfers:     · Sit to Stand:  maximal assistance and of 2 persons with hand-held assist      AM-PAC 6 CLICK MOBILITY          Therapeutic Activities and Exercises:   B LE therex to include 1x10 reps of Tashi barajas  AP's. Sit to stand with 2 attempts, remained standing 1 minute  NWB on L LE, tremulous B UE due to weakness.    Patient left sitting edge of bed with all lines intact, call button in reach and OT present..    GOALS:   Multidisciplinary Problems     Physical Therapy Goals        Problem: Physical Therapy    Goal Priority Disciplines Outcome Goal Variances Interventions   Physical Therapy Goal     PT, PT/OT Ongoing, Progressing     Description: Goals to be met by: 22     Patient will increase functional independence with mobility by performin. Supine to sit with Modified [place]  2. Sit to supine with Modified [place]  3. Sit to stand transfer with Contact Guard Assistance  4. Bed to chair transfer with Contact Guard Assistance using Rolling Walker maintaining NWB L LE  5. Wheelchair propulsion x150 feet with Supervision using bilateral uppper extremities                     Time Tracking:     PT Received On: 22  PT Start Time: 1030     PT Stop Time: 1103  PT Total Time (min): 33 min     Billable Minutes: Therapeutic Activity 15 minutes and Therapeutic Exercise 16 minutes    Treatment Type: Treatment  PT/PTA: PT           2022

## 2022-05-09 NOTE — PT/OT/SLP PROGRESS
Speech Language Pathology Treatment    Patient Name:  Leanna García   MRN:  [id number]  Admitting Diagnosis: Bacteremia    Recommendations:                 General Recommendations:    · Outpatient GI consultation: history of suspected cervical esophageal dysphagia (previous MBS)      Diet recommendations:  1) IDDSI 7, easy to chew  2) Thin liquids      Aspiration Precautions:   ·  HOB elevated, upright   · Meds whole one at a time w/ liquid or buried in applesauce; Pt preference or RN discretion       Subjective     Pt alert, seated edge of bed eating lunch   Patient goals: none stated      Pain/Comfort:  ·  none indicated     Respiratory Status: Nasal cannula, flow 3 L/min    Objective:     Has the patient been evaluated by SLP for swallowing?   Yes  Keep patient NPO? No   Current Respiratory Status:        Swallowing:   Consistencies presented  · Thin liquid: cyclical straw x10   · Solid: bites of hamburger x5     Oral Phase:   · Intact labial seal to cup edge, straw and for spoon stripping   · Adequate oral containment without anterior spillage across consistencies   · Prolonged but functional a mastication; increased due to verbal expression/presence of clinician   · Mild residue with dry solid, cleared w/ liquid wash      Pharyngeal Phase:   · No overt signs or symptoms of aspiration, airway threat: no coughing, throat clearing   · Clear vocal quality maintained .   · Multiple swallows not observed across consistencies trialled.   · Globus sensation not promoted this session       Assessment:     Leanna García is a 56 y.o. female with an SLP diagnosis of no acute changes related to speech, language, swallowing or cognitive communication.     Goals:   Multidisciplinary Problems     SLP Goals        Problem: SLP    Goal Priority Disciplines Outcome   SLP Goal     SLP Ongoing, Progressing   Description: 1. Pt will tolerate least restrictive PO diet without acute dysphagia pulmonary complication.                         Plan:     No SLP followup     Time Tracking:     SLP Treatment Date:   05/09/22  Speech Start Time:  1210  Speech Stop Time:  1226     Speech Total Time (min):  16 min    Billable Minutes: Treatment Swallowing Dysfunction 16    05/09/2022

## 2022-05-09 NOTE — PROGRESS NOTES
NMConsult Note  Infectious Disease    Reason for Consult:  Bacillus cereus bacteremia, and MRSA osteomyelitis     HPI: Leanna García is a 56 y.o. female known to me from prior consultation in September of 2018, was admitted through the emergency room yesterday with fever 103,   Vomiting of 1 day's duration, and chronic sinus congestion, postnasal drip, cough at least several weeks duration and a foot ulcer on the plantar/medial surface surface of the left foot of probably several months duration.  The vomiting began within a few hours of a meal at a [employer] restaurant.  She did not have any diarrhea In the emergency room she had a CT scan of the abdomen which suggested possible enteritis and bilateral perinephric fat stranding, as well as cholelithiasis, and extensive vascular calcifications.  her blood sugar was 446, white blood cells 21,000, normal lactic acid.  Photographs taken in the emergency room suggest cellulitis of the medial and plantar foot She was started on Zosyn and doxycycline as she has history of vancomycin allergy. .  She was seen by Podiatry , and MRI did not demonstrate any abscess or drainage and wound care was ordered.  She was noted this morning to have difficulty swallowing and was seen by speech therapy.  Today 1 anaerobic bottle has a Gram-positive mariana prompting this consult.    5/6: interim reviewed. Still febrile through last night. Blood culture does have characteristics of Bacillus cereus. This will be sent out to [facility] for MALDI ID. Respiratory pcr panel was negative. She was able to eat solid food this am. She denies abdominal pain. Podiatry debrided her foot this am and submitted cultures and is ordering a WBC Scan. Coughing less. No wheezes.     5/7 (Daren):  Interim reviewed, discussed with Dr Bailon.  Patient seen and examined at bedside, states she does not feel good today, feels like she got something that is making her feel off, not her usual self.  Labile BP  121/58, low-grade fever T-max 102° yesterday, currently 100.8.  Wound cultures from left foot grew Staph aureus, pending sensitivities.  She is currently on Daptomycin, Zosyn and levofloxacin IV.    5/8: Interim reviewed, patient seen and examined at bedside. Feeling significantly better compared to yesterday.  Having lunch at bedside.  Hemodynamically stable, T-max 103° yesterday, currently afebrile.  Labs reviewed, white count 17, PMN 79.2%, no bands.  H&H 7/23.3, platelet count 332. Sodium 135, potassium 4.4.  Status post further debridement at bedside today by Podiatry, large deep tissue abscess seen on the plantar facial below the flexor tendon.  Mild necrosis noted and all nonviable tissue was debrided, cultures in process.  Micro updated, confirmed Bacillus cereus bacteremia, wound culture from left foot grew MRSA.    5/9 (Adamaris):  Interim reviewed.  Blood culture isolate confirmed to be bacillus serious, likely from the restaurant that she ate before the onset of her illness.  Wound cultures from 05/16//8 have MRSA.  As she is allergic to vancomycin she is on daptomycin.  Echocardiogram negative temperature is improved since the drainage of her foot though she did have a 101 temperature last night.  White blood cells remain elevated, hemoglobin 6.7, she is receiving blood, CRP is down about 10%. Not getting out of bed. Sugary soft drinks on her bedside table.       EXAM & DIAGNOSTICS REVIEWED:  4 in going home   Vitals:     Temp:  [97.7 °F (36.5 °C)-101.7 °F (38.7 °C)]   Temp: 99 °F (37.2 °C) (05/09/22 1540)  Pulse: 64 (05/09/22 1540)  Resp: 18 (05/09/22 1540)  BP: (!) 156/67 (05/09/22 1540)  SpO2: 100 % (05/09/22 1540)    Intake/Output Summary (Last 24 hours) at 5/9/2022 1557  Last data filed at 5/9/2022 1543  Gross per 24 hour   Intake 840 ml   Output 0 ml   Net 840 ml       General:  In NAD. Alert and attentive, cooperative, comfortable on room air  Eyes:  Anicteric,  EOMI  ENT:  No ulcers, exudates,  thrush, nares patent, edentulous  Neck:  Supple,   Lungs: Clear  Heart:  RRR, 2/6 systolic murmur +  Abd:  Soft, NT, mildly distended, normal BS, no masses or organomegaly appreciated.  :  Voids  no flank tenderness  Musc:  Joints without effusion, swelling, erythema, synovitis but she does have lower extremity muscle wasting.  She has Charcot arthropathy with fallen arches bilaterally and an ulceration on the medial arch   Skin:  No rashes   Neuro:Alert, attentive, speech fluent, face symmetric, moves all extremities, no focal weakness.  Minimally Ambulatory at baseline  Psych:  Calm, cooperative  Lymphatic:        Extrem: Left foot with wound vac in place     No edema, erythema, phlebitis, warm and well perfused  VAD:  Left arm AV fistula  No redness     Isolation: contact - MRSA  Wound:           5/6:   This was debrided full thickness this am    5/8:        5/9: bandage changed, packing not disturbed    General Labs reviewed:  Recent Labs   Lab 05/07/22 0359 05/08/22 0723 05/09/22  0540   WBC 15.09* 17.57* 19.57*   HGB 7.3* 7.0* 6.7*   HCT 24.3* 23.3* 22.1*    332 337       Recent Labs   Lab 05/04/22 2040 05/05/22 0320 05/07/22  0358 05/08/22  0723 05/09/22  0540   *   < > 134* 135* 133*   K 3.2*   < > 4.5 4.4 4.7   CL 84*   < > 98 95 96   CO2 24   < > 22* 29 21*   BUN 51*   < > 44* 71* 89*   CREATININE 9.9*   < > 7.3* 10.1* 11.3*   CALCIUM 8.8   < > 9.1 9.6 9.6   PROT 8.0  --   --   --   --    BILITOT 0.9  --   --   --   --    ALKPHOS 60  --   --   --   --    ALT 11  --   --   --   --    AST 13  --   --   --   --     < > = values in this interval not displayed.     Recent Labs   Lab 05/04/22 2040 05/08/22  0723   CRP 32.78* 23.11*         Micro:  Microbiology Results (last 7 days)     Procedure Component Value Units Date/Time    Blood culture [[id number]] Collected: 05/06/22 1100    Order Status: Completed Specimen: Blood Updated: 05/09/22 1232     Blood Culture, Routine No Growth to date       No Growth to date      No Growth to date      No Growth to date    Narrative:      Collection has been rescheduled by RE1 at 05/06/2022 08:51 Reason:   Having a scan  Collection has been rescheduled by SLR at 05/06/2022 10:27 Reason:   Unable to collect  Collection has been rescheduled by RE1 at 05/06/2022 10:35 Reason:   Unable to collect  Collection has been rescheduled by RE1 at 05/06/2022 08:51 Reason:   Having a scan  Collection has been rescheduled by SLR at 05/06/2022 10:27 Reason:   Unable to collect  Collection has been rescheduled by RE1 at 05/06/2022 10:35 Reason:   Unable to collect    Blood culture [[id number]] Collected: 05/08/22 0723    Order Status: Completed Specimen: Blood Updated: 05/09/22 1032     Blood Culture, Routine No Growth to date      No Growth to date    Narrative:      Collection has been rescheduled by SLR at 05/08/2022 06:15 Reason:   Unable to collect  Collection has been rescheduled by RE1 at 05/08/2022 06:55 Reason:   Unable to collect   Collection has been rescheduled by SLR at 05/08/2022 06:15 Reason:   Unable to collect  Collection has been rescheduled by RE1 at 05/08/2022 06:55 Reason:   Unable to collect     Tissue culture [[id number]]  (Abnormal) Collected: 05/08/22 1003    Order Status: Completed Specimen: Tissue Updated: 05/09/22 1014     Aerobic Culture - Tissue STAPHYLOCOCCUS AUREUS  Rare  Susceptibility pending       Gram Stain Result Moderate WBC's      No organisms seen    Blood culture #2 **CANNOT BE ORDERED STAT** [[id number]] Collected: 05/04/22 2155    Order Status: Completed Specimen: Blood from Peripheral, Antecubital, Right Updated: 05/08/22 2232     Blood Culture, Routine No Growth to date      No Growth to date      No Growth to date      No Growth to date      No Growth to date    Culture, Anaerobic [[id number]] Collected: 05/08/22 1003    Order Status: Sent Specimen: Abscess from Foot, Left Updated: 05/08/22 1012    Culture, Anaerobic [[id number]] Collected:  05/06/22 0753    Order Status: Completed Specimen: Wound from Foot, Left Updated: 05/08/22 1011     Anaerobic Culture No anaerobes isolated    Aerobic culture [[id number]] Collected: 05/08/22 1003    Order Status: Canceled Specimen: Tissue from Foot, Left     Aerobic culture [[id number]]  (Abnormal)  (Susceptibility) Collected: 05/06/22 0753    Order Status: Completed Specimen: Wound from Foot, Left Updated: 05/08/22 0832     Aerobic Bacterial Culture METHICILLIN RESISTANT STAPHYLOCOCCUS AUREUS  Many  Results called to and read back by Kathy Maki RN-SPENCERRD;    05/08/2022  08:32 CJD      Culture, ID (Consult) [[id number]] Collected: 05/04/22 2040    Order Status: Completed Specimen: Blood Updated: 05/07/22 1422     Culture, Identification (consult) Bacillus cereus    Respiratory Infection Panel (PCR), Nasopharyngeal [[id number]] Collected: 05/05/22 1826    Order Status: Completed Specimen: Nasopharyngeal Swab Updated: 05/05/22 2126     Respiratory Infection Panel Source NP swab     Adenovirus Not Detected     Coronavirus 229E, Common Cold Virus Not Detected     Coronavirus HKU1, Common Cold Virus Not Detected     Coronavirus NL63, Common Cold Virus Not Detected     Coronavirus OC43, Common Cold Virus Not Detected     Comment: The Coronavirus strains detected in this test cause the common cold.  These strains are not the COVID-19 (novel Coronavirus)strain   associated with the respiratory disease outbreak.          SARS-CoV2 (COVID-19) Qualitative PCR Not Detected     Human Metapneumovirus Not Detected     Human Rhinovirus/Enterovirus Not Detected     Influenza A (subtypes H1, H1-2009,H3) Not Detected     Influenza B Not Detected     Parainfluenza Virus 1 Not Detected     Parainfluenza Virus 2 Not Detected     Parainfluenza Virus 3 Not Detected     Parainfluenza Virus 4 Not Detected     Respiratory Syncytial Virus Not Detected     Bordetella Parapertussis (FP9447) Not Detected     Bordetella pertussis (ptxP)  Not Detected     Chlamydia pneumoniae Not Detected     Mycoplasma pneumoniae Not Detected     Comment: Respiratory Infection Panel testing performed by Multiplex PCR.       Narrative:      Respiratory Infection Panel source->NP Swab    Blood culture #1 **CANNOT BE ORDERED STAT** [[id number]] Collected: 05/04/22 2040    Order Status: Canceled Specimen: Blood from Peripheral, Antecubital, Right           Imaging Reviewed:   CXR   CT abdomen and pelvis 5/4   1.  Small to moderate amount of fluid in the small intestines with scattered air fluid levels, possibly enteritis 2.  Moderate bilateral perinephric stranding. Correlate with urinalysis to exclude the possibility of pyelonephritis 3.  Gallbladder distention with gallstones     MRI of the left midfoot 5/5  IMPRESSION: 16mm skin ulceration in the medial plantar soft tissues with associated cellulitis. There is no abscess or osteomyelitis   Moderate degenerative changes of the midfoot    NM scan Abnormal indium-111 WBC uptake along medial left foot suggesting source of infection.     Cardiology:   ECHO 5/6/22  The left ventricle is normal in size with normal systolic function.  The estimated ejection fraction is 65%.  Normal left ventricular diastolic function.  Normal right ventricular size with normal right ventricular systolic function.  Mild mitral regurgitation.  All valves visualized, normal.    IMPRESSION & PLAN     1. Bacillus cereus bacteremia likely in the setting of recent Chinese food ingestion   Blood cultures 5/6 & 5/8 no growth, pending final   Echocardiogram negative    2. Left foot ulcer, deep tissue abscess/osteomyelitis status post debridement by Podiatry 5/6 and 5/8, deep pocket of pus drained 5/9   Wound culture 5/6 MRSA    3. ESRD on HD,  Nephrology following    4.  Diabetes with hyperglycemia, A1c 14%     Recommendations:    Continue Daptomycin 500 mg IV q48h for MRSA and Bacillus cereus  Continue Zosyn 4.5 g IV q.12 for now (will likely limit  this to 7-10 days)    Astelin nasal spray, Antitussives, Bronchodilators     Patient will benefit from LTAC to continue wound care to left foot with wound VAC and IV antibiotics.    stopping atorvastatin while on daptomycin   Counseled to avoid sugary soft drinks  D/w  Patient and     Medical Decision Making during this encounter was  [_] Low Complexity  [_] Moderate Complexity  [x  ] High Complexity

## 2022-05-09 NOTE — PLAN OF CARE
Patient clinically accepted for LTAC services at [facility] per flor Friedman who is submitting for insurance authorization.        05/09/22 8780   Post-Acute Status   Post-Acute Authorization Placement   Post-Acute Placement Status Pending payor review/awaiting authorization (if required)

## 2022-05-09 NOTE — PROGRESS NOTES
[facility]  Podiatry  Progress Note    Patient Name: Leanna García  MRN: [id number]  Admission Date: 5/4/2022  Hospital Length of Stay: 5 days  Attending Physician: Usman Stacy MD  Primary Care Provider: Stefano Lopez MD     Subjective:     Interval History:  Patient is sitting up in bed.  Appears more alert today.  Dressing intact from incision and drainage yesterday.  Wound VAC to be applied by wound care nursing today        Scheduled Meds:   ascorbic acid (vitamin C)  500 mg Oral Daily    aspirin  81 mg Oral Daily    atorvastatin  80 mg Oral QHS    azelastine  1 spray Nasal BID    calcium acetate(phosphat bind)  1,334 mg Oral TID WM    cetirizine  10 mg Oral Every other day    collagenase   Topical (Top) Daily    DAPTOmycin (CUBICIN)  IV  500 mg Intravenous Q48H    epoetin chris-epbx  100 Units/kg Intravenous Every Mon, Wed, Fri    fluticasone propionate  2 spray Each Nostril Daily    insulin detemir U-100  20 Units Subcutaneous QHS    metoprolol tartrate  25 mg Oral BID    multivitamin  1 tablet Oral Daily    mupirocin   Nasal BID    piperacillin-tazobactam (ZOSYN) IVPB  3.375 g Intravenous Q12H     Continuous Infusions:  PRN Meds:sodium chloride, acetaminophen, benzonatate, dextrose 50%, dextrose 50%, guaiFENesin-codeine 100-10 mg/5 ml, HYDROcodone-acetaminophen, insulin aspart U-100, melatonin, naloxone, ondansetron, polyethylene glycol, sodium chloride 0.9%    Review of Systems  Objective:     Vital Signs (Most Recent):  Temp: 98.7 °F (37.1 °C) (05/09/22 1126)  Pulse: (!) 55 (05/09/22 1126)  Resp: 18 (05/09/22 1126)  BP: (!) 139/59 (05/09/22 1126)  SpO2: 95 % (05/09/22 1126)   Vital Signs (24h Range):  Temp:  [97.7 °F (36.5 °C)-102.9 °F (39.4 °C)] 98.7 °F (37.1 °C)  Pulse:  [52-77] 55  Resp:  [18-20] 18  SpO2:  [85 %-98 %] 95 %  BP: (115-157)/(56-74) 139/59     Weight: 89 kg (196 lb 3.4 oz)  Body mass index is 30.73 kg/m².    Foot Exam      Laboratory:  All pertinent labs  reviewed within the last 24 hours.    Diagnostic Results:  I have reviewed all pertinent imaging results/findings within the past 24 hours.  .    Assessment/Plan:     Diabetic ulcer of left midfoot  Wound VAC changes twice weekly to both wounds on left foot.    Wilman ordered for nutrition for wound healing    Nonweightbearing to left foot     Counseled patient on increasing protein intake, not getting wound wet, keeping dressing clean dry and intact, following a healthy diet, removing pressure from wound      Recommend LTAC following discharge for IV antibiotics and wound care with wound VAC        Alivia Bruno DPM  Podiatry  [facility]

## 2022-05-09 NOTE — PLAN OF CARE
Problem: Adult Inpatient Plan of Care  Goal: Absence of Hospital-Acquired Illness or Injury  Outcome: Ongoing, Progressing     Problem: Adult Inpatient Plan of Care  Goal: Optimal Comfort and Wellbeing  Outcome: Ongoing, Progressing     Problem: Diabetes Comorbidity  Goal: Blood Glucose Level Within Targeted Range  Outcome: Ongoing, Progressing     Problem: Adjustment to Illness (Sepsis/Septic Shock)  Goal: Optimal Coping  Outcome: Ongoing, Progressing     Problem: Bleeding (Sepsis/Septic Shock)  Goal: Absence of Bleeding  Outcome: Ongoing, Progressing     Problem: Glycemic Control Impaired (Sepsis/Septic Shock)  Goal: Blood Glucose Level Within Desired Range  Outcome: Ongoing, Progressing     Problem: Fall Injury Risk  Goal: Absence of Fall and Fall-Related Injury  Outcome: Ongoing, Progressing     Problem: Skin Injury Risk Increased  Goal: Skin Health and Integrity  Outcome: Ongoing, Progressing     Problem: Infection Progression (Sepsis/Septic Shock)  Goal: Absence of Infection Signs and Symptoms  Outcome: Ongoing, Progressing

## 2022-05-09 NOTE — NURSING
Pt is pending 1unit PRBC blood transfusion to be given with dialysis today. Pt will be dialyzed in her room as she is in contact isolation now.

## 2022-05-09 NOTE — PROGRESS NOTES
INPATIENT NEPHROLOGY Progress Note  [facility] NEPHROLOGY    Leanna García  05/09/2022    Reason for consultation:    ESRD    Chief Complaint:   Chief Complaint   Patient presents with    Fever          History of Present Illness:    Per H and P    Ms. García is a 56-year-old female with a past medical history of ESRD on HD Monday Wednesday Friday, hypertension, IDDM2, PAF, and chronic cough who presents today with complaints of fever up to 103. It is severe.  It is associated with cough, posttussive vomiting, fatigue, sinus congestion, weakness, and a left foot ulcer.  She denies chest pain, diarrhea, dizziness, loss of consciousness.  In the ED she was noted to have a large left foot ulceration at the plantar surface and on the side of the foot.  CT of abdomen pelvis also reveals possible enteritis and bilateral perinephric fat stranding.  WBCs 21 K, troponin is 0.4 in the setting of ESRD.  She did not go to dialysis today she felt too bad.  Glucose is 446, anion gap 20, bicarb 24.     5/5  C/o foot pain.  Mild dyspnea.  No vomiting, chest pain, urinary or bowel complaints.  Weak  5/6  Currently getting wound vac placed.  Has foot pain. No respiratory distress  5/7 still spiking fevers.  Foot pain.  Stopped hd early yesterday  5/8  Tired today.  No vomiting or sob.  Tmax 101.9 at 1900 yesterday  5/9 wound vac today    Plan of Care:     ESRD on HD MWF  --continue dialysis per routine    Hypertension  --fluid restrict 1.5L/day  --low salt diet 2g/day  --uf with hd    Hyponatremia  --fluid restrict  --140 Na dialysate    SHPT  --check phos     Anemia--worsening  --will transfuse 1u of blood with HD today  --increased dose of erythropoiesis stimulating agent with renal replacement therapy    Diab Foot Ulcer  --getting wound vac  --dose antbx for CrCl< 10/HD    Thank you for allowing us to participate in this patient's care. We will continue to follow.    Vital Signs:  Temp Readings from Last 3 Encounters:    05/09/22 98.7 °F (37.1 °C) (Oral)   04/26/22 98.2 °F (36.8 °C)   03/10/22 97.3 °F (36.3 °C) (Tympanic)       Pulse Readings from Last 3 Encounters:   05/09/22 (!) 55   04/26/22 66   04/05/22 75       BP Readings from Last 3 Encounters:   05/09/22 (!) 139/59   04/28/22 (!) 180/82   04/26/22 (!) 174/66       Weight:  Wt Readings from Last 3 Encounters:   05/08/22 89 kg (196 lb 3.4 oz)   05/06/22 86.6 kg (191 lb)   04/28/22 94 kg (207 lb 5.5 oz)       Medications:  No current facility-administered medications on file prior to encounter.     Current Outpatient Medications on File Prior to Encounter   Medication Sig Dispense Refill    ascorbic acid, vitamin C, (VITAMIN C) 500 MG tablet Take 500 mg by mouth once daily.      aspirin (ECOTRIN) 81 MG EC tablet Take 81 mg by mouth once daily.      atorvastatin (LIPITOR) 80 MG tablet Take 1 tablet (80 mg total) by mouth once daily. (Patient taking differently: Take 80 mg by mouth every evening.) 90 tablet 3    blood sugar diagnostic Strp To check BG 4 times daily, to use with insurance preferred meter (Patient taking differently: 1 strip by Misc.(Non-Drug; Combo Route) route 4 (four) times daily. To check BG 4 times daily, to use with insurance preferred meter) 450 strip 3    insulin aspart U-100 (NOVOLOG FLEXPEN U-100 INSULIN) 100 unit/mL (3 mL) InPn pen Inject 5-8 units 3 times per day with food. 1 Box 6    insulin detemir U-100 (LEVEMIR FLEXTOUCH U-100 INSULN) 100 unit/mL (3 mL) InPn pen Inject 15-18 Units into the skin once daily. 1 Box 6    lancets Misc To use to check blood sugar 4 times daily. To use with insurance approved meter. Patient needs the round, purple one (Patient taking differently: 1 lancet by Misc.(Non-Drug; Combo Route) route 4 (four) times daily. To use to check blood sugar 4 times daily. To use with insurance approved meter. Patient needs the round, purple one) 450 each 3    metoprolol tartrate (LOPRESSOR) 25 MG tablet Take 25 mg by mouth 2  "(two) times daily.      minoxidiL (LONITEN) 2.5 MG tablet Take 5 mg by mouth 2 (two) times daily.      multivitamin (THERAGRAN) per tablet Take 1 tablet by mouth once daily.      pen needle, diabetic (BD ULTRA-FINE ROBERT PEN NEEDLE) 32 gauge x 5/32" Ndle To use 4 times per day with insulin injections. (Patient taking differently: 1 pen by Misc.(Non-Drug; Combo Route) route 4 (four) times daily. To use 4 times per day with insulin injections.) 450 each 2    sucroferric oxyhydroxide (VELPHORO) 500 mg Chew Take 500 mg by mouth 3 (three) times daily.      dextrose (GLUCOSE GEL) 40 % gel Take 37.5 mLs (15,000 mg total) by mouth once as needed (hypoglycemia). 37.5 g 4    gabapentin (NEURONTIN) 300 MG capsule Take 300 mg by mouth every evening.      glucagon (BAQSIMI) 3 mg/actuation Spry 3 mg (one actuation) into a single nostril; if no response, may repeat in 15 minutes using a new intranasal device. (Patient taking differently: 3 mg by Left Nostril route as needed. 3 mg (one actuation) into a single nostril; if no response, may repeat in 15 minutes using a new intranasal device.) 2 each 1    [DISCONTINUED] blood-glucose meter (ACCU-CHEK KAYLIE PLUS METER) Misc To use to check blood sugars 4 times a day 1 each 0    [DISCONTINUED] clorazepate (TRANXENE) 3.75 MG Tab Take 7.5 mg by mouth nightly as needed.       [DISCONTINUED] fenofibrate 160 MG Tab Take 1 tablet (160 mg total) by mouth once daily. 90 tablet 3    [DISCONTINUED] lancing device with lancets (ACCU-CHEK SOFT DEV LANCETS) Kit To check blood sugars 4 times per day 400 each 3     Scheduled Meds:   ascorbic acid (vitamin C)  500 mg Oral Daily    aspirin  81 mg Oral Daily    atorvastatin  80 mg Oral QHS    azelastine  1 spray Nasal BID    calcium acetate(phosphat bind)  1,334 mg Oral TID WM    cetirizine  10 mg Oral Every other day    collagenase   Topical (Top) Daily    DAPTOmycin (CUBICIN)  IV  500 mg Intravenous Q48H    epoetin chris-epbx  100 " "Units/kg Intravenous Every Mon, Wed, Fri    fluticasone propionate  2 spray Each Nostril Daily    insulin detemir U-100  20 Units Subcutaneous QHS    metoprolol tartrate  25 mg Oral BID    multivitamin  1 tablet Oral Daily    mupirocin   Nasal BID    piperacillin-tazobactam (ZOSYN) IVPB  3.375 g Intravenous Q12H     Continuous Infusions:  PRN Meds:.sodium chloride, acetaminophen, benzonatate, dextrose 50%, dextrose 50%, guaiFENesin-codeine 100-10 mg/5 ml, HYDROcodone-acetaminophen, insulin aspart U-100, melatonin, naloxone, ondansetron, polyethylene glycol, sodium chloride 0.9%    Review of Systems:  Neg    Physical Exam:    BP (!) 139/59 (BP Location: Right arm, Patient Position: Lying)   Pulse (!) 55   Temp 98.7 °F (37.1 °C) (Oral)   Resp 18   Ht 5' 7" (1.702 m)   Wt 89 kg (196 lb 3.4 oz)   SpO2 95%   BMI 30.73 kg/m²     Constitutional: nad, aao x 3  Heart: rrr, no m/r/g, wwp, no edema  Lungs: ctab, no w/r/r/c, no lb  Abdomen: s/nt/nd, +BS    Results:  Lab Results   Component Value Date     (L) 05/09/2022    K 4.7 05/09/2022    CL 96 05/09/2022    CO2 21 (L) 05/09/2022    BUN 89 (H) 05/09/2022    CREATININE 11.3 (H) 05/09/2022    CALCIUM 9.6 05/09/2022    ANIONGAP 16 05/09/2022    ESTGFRAFRICA 3.9 (A) 05/09/2022    EGFRNONAA 3.4 (A) 05/09/2022       Lab Results   Component Value Date    CALCIUM 9.6 05/09/2022    PHOS 4.2 03/10/2022       Recent Labs   Lab 05/09/22  0540   WBC 19.57*   RBC 2.46*   HGB 6.7*   HCT 22.1*      MCV 90   MCH 27.2   MCHC 30.3*        Imaging Results          X-Ray Foot Complete Left (Final result)  Result time 05/05/22 06:12:52    Final result by Denise Estrada MD (05/05/22 06:12:52)                 Narrative:    Three views of the left foot are compared to prior study dated 12/3/2021    Clinical history is infection    There is osteopenia. There has been prior amputation of the fourth digit at the base of the proximal phalanx.    There are moderate " degenerative changes of the midfoot and hindfoot with joint space narrowing and spurring.. There are no fractures, dislocations or osteolysis. There is a subcutaneous ulceration the mid plantar soft tissues. There is moderate peripheral vascular calcification.    IMPRESSION: Prior amputation of fourth digit without radiographic evidence for osteomyelitis.    Moderate degenerative changes of the foot    16mm soft tissue ulceration in the mid plantar medial soft tissues    Electronically signed by:  Denise Estrada MD  5/5/2022 6:12 AM CDT Workstation: [id number]                             CT Abdomen Pelvis  Without Contrast (Final result)  Result time 05/04/22 21:53:02    Final result by Ernesto Lino MD (05/04/22 21:53:02)                 Narrative:    EXAM: CT ABDOMEN PELVIS WITHOUT CONTRAST    RadLex: CT ABDOMEN PELVIS WITHOUT IV CONTRAST    HISTORY: 56 years  Female;  Abdominal pain, acute, nonlocalized;    TECHNIQUE:   Standard CT of the abdomen and pelvis was performed without the use of intravenous contrast media. Lack of intravenous contrast limits evaluation of soft tissue structures in this study.    CT scans at this facility use dose modulation, iterative reconstruction and/or weight based dosing when appropriate to reduce radiation dose to as low as reasonably achievable.    COMPARISON: None available    FINDING(S):    ABDOMEN:    Lung bases show dependent atelectasis. Coronary artery calcifications are seen. Cardiac size normal. Mild thickening of the distal esophagus with small hiatal hernia.    Small amount of fluid is seen in the stomach. Mild thickening of the stomach lining.    Mild hepatic steatosis. Spleen, pancreas, and both adrenals are normal.    There is gallbladder distention with multiple gallstones.    Neither kidney shows hydronephrosis. There is moderate bilateral perinephric stranding which appears above expected and may represent inflammation. Correlate with urinalysis to exclude  the possibility of pyelonephritis.    There are vascular calcifications involving both kidneys. There may be separate small stones as well. The ureters themselves are decompressed.    Marked atherosclerosis affects the aorta and the major branch vessels. No abdominal lymphadenopathy or free abdominal fluid.    The small intestines contain a small to moderate amount of fluid with scattered air-fluid levels. No transition point to suggest obstruction.    No evidence of appendicitis. Oral contrast is seen in the intestines. There is a mild to moderate stool burden. Scattered colonic diverticulosis.    PELVIS:    Bladder is decompressed. No free fluid within the pelvis. There is an enlarged left external iliac lymph node measuring 1.6 x 0.8 cm on series 3, image 210.    No other groin or pelvis lymphadenopathy. A small left inguinal hernia contains only fat.    Small umbilical hernia contains only fat.    There are degenerative changes in the lower lumbar spine.    IMPRESSION:    1.  Small to moderate amount of fluid in the small intestines with scattered air fluid levels, possibly enteritis  2.  Moderate bilateral perinephric stranding. Correlate with urinalysis to exclude the possibility of pyelonephritis  3.  Gallbladder distention with gallstones    Electronically signed by:  Ernesto Lino MD  5/4/2022 9:53 PM CDT Workstation: [id number]                             X-Ray Chest 1 View (Final result)  Result time 05/05/22 06:10:56    Final result by Denise Estrada MD (05/05/22 06:10:56)                 Narrative:    XR CHEST 1 VIEW    CLINICAL HISTORY:  56 years Female pain    COMPARISON: 5/19/2021    FINDINGS: Cardiomediastinal silhouette is within normal limits. Lungs are normally expanded with no airspace consolidation. No pleural effusion or pneumothorax. No acute osseous abnormality.    IMPRESSION: No acute pulmonary process.    Electronically signed by:  Denise Estrada MD  5/5/2022 6:10 AM CDT Workstation:  [id number]                                I have personally reviewed pertinent radiological imaging and reports.     Patient care was time spent personally by me on the following activities: > 35 min  · Obtaining a history  · Examination of patient.  · Providing medical care at the patients bedside.  · Developing a treatment plan with patient or surrogate and bedside caregivers  · Ordering and reviewing laboratory studies, radiographic studies, pulse oximetry.  · Ordering and performing treatments and interventions.  · Evaluation of patient's response to treatment.  · Discussions with consultants while on the unit and immediately available to the patient.  · Re-evaluation of the patient's condition.  · Documentation in the medical record.     Ashleigh Soria MD MPH  [facility]  [address] [place] [id number]  [phone] (p)  [phone] (f)

## 2022-05-09 NOTE — PLAN OF CARE
05/09/22 1205   Patient Assessment/Suction   Level of Consciousness (AVPU) alert   Respiratory Effort Normal;Unlabored   PRE-TX-O2   O2 Device (Oxygen Therapy) nasal cannula   $ Is the patient on Low Flow Oxygen? Yes   Flow (L/min) 2   SpO2 95 %   Pulse Oximetry Type Intermittent   $ Pulse Oximetry - Multiple Charge Pulse Oximetry - Multiple   Pulse (!) 58   Resp 18   Education   $ Education 15 min   Respiratory Evaluation   $ Care Plan Tech Time 15 min

## 2022-05-09 NOTE — PT/OT/SLP PROGRESS
Occupational Therapy   Treatment    Name: Leanna García  MRN: [id number]  Admitting Diagnosis:  Bacteremia       Recommendations:     Discharge Recommendations: LTACH (long-term acute care hospital), nursing facility, skilled  Discharge Equipment Recommendations:   (TBD)  Barriers to discharge:       Assessment:     Leanna García is a 56 y.o. female with a medical diagnosis of Bacteremia.  Performance deficits affecting function are weakness, impaired endurance, impaired self care skills, impaired functional mobilty, gait instability, impaired balance, decreased lower extremity function, decreased safety awareness, pain, orthopedic precautions, impaired skin.     Pt completed sit to stand with PT and OT requiring Max A and verbal cues for Non weight bearing on LLE. Pt able to stand for up to 1 minute until fatigued. Weakness to RLE and BUE and lethargic today but able to complete session with max effort.    Rehab Prognosis:  Fair; patient would benefit from acute skilled OT services to address these deficits and reach maximum level of function.       Plan:     Patient to be seen 5 x/week to address the above listed problems via self-care/home management, therapeutic activities, therapeutic exercises  · Plan of Care Expires: 06/08/22  · Plan of Care Reviewed with: patient    Subjective     Pain/Comfort:  · Pain Rating 1: other (see comments) (not rated)  · Location - Side 1: Left  · Location 1: foot  · Pain Addressed 1: Distraction, Cessation of Activity, Pre-medicate for activity    Objective:     Communicated with: nurse prior to session.  Patient found sitting EOB with PT with telemetry, oxygen, peripheral IV upon OT entry to room.    General Precautions: Standard, fall, contact   Orthopedic Precautions:LLE non weight bearing   Braces: N/A  Respiratory Status: Nasal cannula, flow 2 L/min     Occupational Performance:     Bed Mobility:    · Patient completed Rolling/Turning to Left with  stand by  assistance  · Patient completed Scooting/Bridging with contact guard assistance  · Patient completed Sit to Supine with minimum assistance     Functional Mobility/Transfers:  · Patient completed Sit <> Stand Transfer with maximal assistance and of 2 persons  with  rolling walker     Activities of Daily Living:  · Grooming: stand by assistance brushing teeth while sitting EOB      [facility] 6 Click ADL:      Treatment & Education:  Role of OT, importance of functional mobility, call bell use.    Patient left HOB elevated with all lines intact, call button in reach and bed alarm onEducation:      GOALS:   Multidisciplinary Problems     Occupational Therapy Goals        Problem: Occupational Therapy    Goal Priority Disciplines Outcome Interventions   Occupational Therapy Goal     OT, PT/OT     Description: Goals to be met by: discharge     Patient will increase functional independence with ADLs by performing:    UE Dressing with Supervision.  LE Dressing with Supervision.  Grooming while seated with Supervision.  Toileting from toilet with Supervision for hygiene and clothing management.   Supine to sit with Supervision.  Toilet transfer to toilet with Supervision.  Perform sitting/standing ADL activity while maintaining LLE NWB with no verbal cues.                     Time Tracking:     OT Date of Treatment: 05/09/22  OT Start Time: 1056  OT Stop Time: 1119  OT Total Time (min): 23 min    Billable Minutes:Self Care/Home Management 10  Therapeutic Activity 13    OT/HARVEY: OT          5/9/2022

## 2022-05-10 LAB
ANION GAP SERPL CALC-SCNC: 16 MMOL/L (ref 8–16)
ANISOCYTOSIS BLD QL SMEAR: SLIGHT
BACTERIA TISS AEROBE CULT: ABNORMAL
BASOPHILS NFR BLD: 0 % (ref 0–1.9)
BUN SERPL-MCNC: 52 MG/DL (ref 6–20)
CALCIUM SERPL-MCNC: 9.1 MG/DL (ref 8.7–10.5)
CHLORIDE SERPL-SCNC: 95 MMOL/L (ref 95–110)
CK SERPL-CCNC: 277 U/L (ref 20–180)
CO2 SERPL-SCNC: 22 MMOL/L (ref 23–29)
CREAT SERPL-MCNC: 6.5 MG/DL (ref 0.5–1.4)
CRP SERPL-MCNC: 23.04 MG/DL
DIFFERENTIAL METHOD: ABNORMAL
EOSINOPHIL NFR BLD: 1 % (ref 0–8)
ERYTHROCYTE [DISTWIDTH] IN BLOOD BY AUTOMATED COUNT: 15.1 % (ref 11.5–14.5)
EST. GFR  (AFRICAN AMERICAN): 7.6 ML/MIN/1.73 M^2
EST. GFR  (NON AFRICAN AMERICAN): 6.6 ML/MIN/1.73 M^2
GLUCOSE SERPL-MCNC: 152 MG/DL (ref 70–110)
GLUCOSE SERPL-MCNC: 159 MG/DL (ref 70–110)
GLUCOSE SERPL-MCNC: 200 MG/DL (ref 70–110)
GLUCOSE SERPL-MCNC: 253 MG/DL (ref 70–110)
GLUCOSE SERPL-MCNC: 316 MG/DL (ref 70–110)
GRAM STN SPEC: ABNORMAL
GRAM STN SPEC: ABNORMAL
HCT VFR BLD AUTO: 26.8 % (ref 37–48.5)
HGB BLD-MCNC: 8.5 G/DL (ref 12–16)
IMM GRANULOCYTES # BLD AUTO: ABNORMAL K/UL (ref 0–0.04)
IMM GRANULOCYTES NFR BLD AUTO: ABNORMAL % (ref 0–0.5)
LYMPHOCYTES NFR BLD: 14 % (ref 18–48)
MCH RBC QN AUTO: 28 PG (ref 27–31)
MCHC RBC AUTO-ENTMCNC: 31.7 G/DL (ref 32–36)
MCV RBC AUTO: 88 FL (ref 82–98)
MONOCYTES NFR BLD: 8 % (ref 4–15)
NEUTROPHILS NFR BLD: 74 % (ref 38–73)
NEUTS BAND NFR BLD MANUAL: 3 %
NRBC BLD-RTO: 0 /100 WBC
PHOSPHATE SERPL-MCNC: 3.3 MG/DL (ref 2.7–4.5)
PLATELET # BLD AUTO: 378 K/UL (ref 150–450)
PMV BLD AUTO: 10.3 FL (ref 9.2–12.9)
POTASSIUM SERPL-SCNC: 3.8 MMOL/L (ref 3.5–5.1)
RBC # BLD AUTO: 3.04 M/UL (ref 4–5.4)
SODIUM SERPL-SCNC: 133 MMOL/L (ref 136–145)
WBC # BLD AUTO: 18.47 K/UL (ref 3.9–12.7)

## 2022-05-10 PROCEDURE — 85027 COMPLETE CBC AUTOMATED: CPT | Performed by: NURSE PRACTITIONER

## 2022-05-10 PROCEDURE — 82550 ASSAY OF CK (CPK): CPT | Performed by: NURSE PRACTITIONER

## 2022-05-10 PROCEDURE — 63600175 PHARM REV CODE 636 W HCPCS: Performed by: NURSE PRACTITIONER

## 2022-05-10 PROCEDURE — 85007 BL SMEAR W/DIFF WBC COUNT: CPT | Performed by: NURSE PRACTITIONER

## 2022-05-10 PROCEDURE — 99232 SBSQ HOSP IP/OBS MODERATE 35: CPT | Mod: ,,, | Performed by: PODIATRIST

## 2022-05-10 PROCEDURE — 99900035 HC TECH TIME PER 15 MIN (STAT)

## 2022-05-10 PROCEDURE — 97530 THERAPEUTIC ACTIVITIES: CPT

## 2022-05-10 PROCEDURE — 99900031 HC PATIENT EDUCATION (STAT)

## 2022-05-10 PROCEDURE — 36415 COLL VENOUS BLD VENIPUNCTURE: CPT | Performed by: NURSE PRACTITIONER

## 2022-05-10 PROCEDURE — 80048 BASIC METABOLIC PNL TOTAL CA: CPT | Performed by: NURSE PRACTITIONER

## 2022-05-10 PROCEDURE — 94761 N-INVAS EAR/PLS OXIMETRY MLT: CPT

## 2022-05-10 PROCEDURE — 63600175 PHARM REV CODE 636 W HCPCS: Performed by: INTERNAL MEDICINE

## 2022-05-10 PROCEDURE — 27000221 HC OXYGEN, UP TO 24 HOURS

## 2022-05-10 PROCEDURE — 97605 NEG PRS WND THER DME<=50SQCM: CPT

## 2022-05-10 PROCEDURE — 25000003 PHARM REV CODE 250: Performed by: INTERNAL MEDICINE

## 2022-05-10 PROCEDURE — 99232 PR SUBSEQUENT HOSPITAL CARE,LEVL II: ICD-10-PCS | Mod: ,,, | Performed by: PODIATRIST

## 2022-05-10 PROCEDURE — 21400001 HC TELEMETRY ROOM

## 2022-05-10 PROCEDURE — 86140 C-REACTIVE PROTEIN: CPT | Performed by: NURSE PRACTITIONER

## 2022-05-10 PROCEDURE — 25000003 PHARM REV CODE 250: Performed by: NURSE PRACTITIONER

## 2022-05-10 PROCEDURE — 97535 SELF CARE MNGMENT TRAINING: CPT

## 2022-05-10 PROCEDURE — 84100 ASSAY OF PHOSPHORUS: CPT | Performed by: NURSE PRACTITIONER

## 2022-05-10 RX ORDER — HEPARIN SODIUM 5000 [USP'U]/ML
5000 INJECTION, SOLUTION INTRAVENOUS; SUBCUTANEOUS
Status: DISCONTINUED | OUTPATIENT
Start: 2022-05-10 | End: 2022-05-10 | Stop reason: SDUPTHER

## 2022-05-10 RX ORDER — SODIUM CHLORIDE 9 MG/ML
INJECTION, SOLUTION INTRAVENOUS
Status: DISCONTINUED | OUTPATIENT
Start: 2022-05-10 | End: 2022-05-19 | Stop reason: HOSPADM

## 2022-05-10 RX ORDER — SODIUM CHLORIDE 9 MG/ML
INJECTION, SOLUTION INTRAVENOUS ONCE
Status: DISCONTINUED | OUTPATIENT
Start: 2022-05-10 | End: 2022-05-11

## 2022-05-10 RX ORDER — LACTULOSE 10 G/15ML
30 SOLUTION ORAL 3 TIMES DAILY
Status: COMPLETED | OUTPATIENT
Start: 2022-05-10 | End: 2022-05-11

## 2022-05-10 RX ORDER — HEPARIN SODIUM 5000 [USP'U]/ML
5000 INJECTION, SOLUTION INTRAVENOUS; SUBCUTANEOUS
Status: DISCONTINUED | OUTPATIENT
Start: 2022-05-10 | End: 2022-05-19 | Stop reason: HOSPADM

## 2022-05-10 RX ADMIN — INSULIN ASPART 4 UNITS: 100 INJECTION, SOLUTION INTRAVENOUS; SUBCUTANEOUS at 05:05

## 2022-05-10 RX ADMIN — LACTULOSE 30 G: 20 SOLUTION ORAL at 08:05

## 2022-05-10 RX ADMIN — PIPERACILLIN AND TAZOBACTAM 3.38 G: 3; .375 INJECTION, POWDER, LYOPHILIZED, FOR SOLUTION INTRAVENOUS; PARENTERAL at 01:05

## 2022-05-10 RX ADMIN — METOPROLOL TARTRATE 25 MG: 25 TABLET, FILM COATED ORAL at 08:05

## 2022-05-10 RX ADMIN — FLUTICASONE PROPIONATE 100 MCG: 50 SPRAY, METERED NASAL at 08:05

## 2022-05-10 RX ADMIN — OXYCODONE HYDROCHLORIDE AND ACETAMINOPHEN 500 MG: 500 TABLET ORAL at 08:05

## 2022-05-10 RX ADMIN — CALCIUM ACETATE 1334 MG: 667 CAPSULE ORAL at 12:05

## 2022-05-10 RX ADMIN — AZELASTINE HYDROCHLORIDE 137 MCG: 137 SPRAY, METERED NASAL at 08:05

## 2022-05-10 RX ADMIN — INSULIN ASPART 3 UNITS: 100 INJECTION, SOLUTION INTRAVENOUS; SUBCUTANEOUS at 09:05

## 2022-05-10 RX ADMIN — INSULIN ASPART 2 UNITS: 100 INJECTION, SOLUTION INTRAVENOUS; SUBCUTANEOUS at 12:05

## 2022-05-10 RX ADMIN — THERA TABS 1 TABLET: TAB at 08:05

## 2022-05-10 RX ADMIN — CALCIUM ACETATE 1334 MG: 667 CAPSULE ORAL at 05:05

## 2022-05-10 RX ADMIN — DAPTOMYCIN 500 MG: 500 INJECTION, POWDER, LYOPHILIZED, FOR SOLUTION INTRAVENOUS at 10:05

## 2022-05-10 RX ADMIN — ASPIRIN 81 MG: 81 TABLET, COATED ORAL at 08:05

## 2022-05-10 RX ADMIN — INSULIN DETEMIR 20 UNITS: 100 INJECTION, SOLUTION SUBCUTANEOUS at 09:05

## 2022-05-10 RX ADMIN — CALCIUM ACETATE 1334 MG: 667 CAPSULE ORAL at 08:05

## 2022-05-10 NOTE — PLAN OF CARE
Problem: Adult Inpatient Plan of Care  Goal: Readiness for Transition of Care  Outcome: Ongoing, Progressing     Problem: Diabetes Comorbidity  Goal: Blood Glucose Level Within Targeted Range  Outcome: Ongoing, Progressing     Problem: Adjustment to Illness (Sepsis/Septic Shock)  Goal: Optimal Coping  Outcome: Ongoing, Progressing     Problem: Glycemic Control Impaired (Sepsis/Septic Shock)  Goal: Blood Glucose Level Within Desired Range  Outcome: Ongoing, Progressing     Problem: Infection Progression (Sepsis/Septic Shock)  Goal: Absence of Infection Signs and Symptoms  Outcome: Ongoing, Progressing

## 2022-05-10 NOTE — SUBJECTIVE & OBJECTIVE
Subjective:     Interval History: Patient resting in bed.   Reviewed Infectious Disease note.  Discussed with patient that following a healthy diet is imperative to wound healing.  Discussed with her that sugary drinks and junk food should be completely eliminated from her diet if she wants to give herself the best chance of saving her leg.        Scheduled Meds:   sodium chloride 0.9%   Intravenous Once    sodium chloride 0.9%   Intravenous Once    ascorbic acid (vitamin C)  500 mg Oral Daily    aspirin  81 mg Oral Daily    azelastine  1 spray Nasal BID    calcium acetate(phosphat bind)  1,334 mg Oral TID WM    cetirizine  10 mg Oral Every other day    collagenase   Topical (Top) Daily    DAPTOmycin (CUBICIN)  IV  500 mg Intravenous Q48H    epoetin chris-epbx  100 Units/kg Intravenous Every Mon, Wed, Fri    fluticasone propionate  2 spray Each Nostril Daily    insulin detemir U-100  20 Units Subcutaneous QHS    metoprolol tartrate  25 mg Oral BID    multivitamin  1 tablet Oral Daily    piperacillin-tazobactam (ZOSYN) IVPB  3.375 g Intravenous Q12H     Continuous Infusions:  PRN Meds:sodium chloride, sodium chloride 0.9%, sodium chloride 0.9%, acetaminophen, benzonatate, dextrose 50%, dextrose 50%, guaiFENesin-codeine 100-10 mg/5 ml, heparin (porcine), HYDROcodone-acetaminophen, insulin aspart U-100, melatonin, naloxone, ondansetron, polyethylene glycol, sodium chloride 0.9%, sodium chloride 0.9%, sodium chloride 0.9%    Review of Systems  Objective:     Vital Signs (Most Recent):  Temp: (!) 100.4 °F (38 °C) (05/10/22 1608)  Pulse: 63 (05/10/22 1608)  Resp: 20 (05/10/22 1608)  BP: (!) 187/73 (05/10/22 1608)  SpO2: (!) 94 % (05/10/22 1608)   Vital Signs (24h Range):  Temp:  [98 °F (36.7 °C)-100.4 °F (38 °C)] 100.4 °F (38 °C)  Pulse:  [54-76] 63  Resp:  [18-20] 20  SpO2:  [94 %-100 %] 94 %  BP: (118-187)/(49-73) 187/73     Weight: 82 kg (180 lb 12.4 oz)  Body mass index is 28.31 kg/m².    Foot Exam  Wound VAC intact  did not disturb     Laboratory:  All pertinent labs reviewed within the last 24 hours.    Diagnostic Results:  I have reviewed all pertinent imaging results/findings within the past 24 hours.

## 2022-05-10 NOTE — RESPIRATORY THERAPY
05/10/22 0900   Patient Assessment/Suction   Level of Consciousness (AVPU) alert   Respiratory Effort Unlabored;Normal   Expansion/Accessory Muscles/Retractions no use of accessory muscles   Cough Frequency no cough   PRE-TX-O2   O2 Device (Oxygen Therapy) nasal cannula   $ Is the patient on Low Flow Oxygen? Yes   Flow (L/min) 2   SpO2 98 %   Pulse Oximetry Type Intermittent   Pulse 64   Resp (!) 22

## 2022-05-10 NOTE — PT/OT/SLP PROGRESS
Physical Therapy Treatment    Patient Name:  Leanna García   MRN:  [id number]    Recommendations:     Discharge Recommendations:  LTACH (long-term acute care hospital), nursing facility, skilled   Discharge Equipment Recommendations: wheelchair       Assessment:     Leanna García is a 56 y.o. female admitted with a medical diagnosis of Bacteremia.  She presents with the following impairments/functional limitations:  weakness, impaired endurance, impaired self care skills, impaired functional mobilty, gait instability, impaired balance, decreased lower extremity function, orthopedic precautions, impaired cardiopulmonary response to activity, impaired skin    Rehab Prognosis: Good; patient would benefit from acute skilled PT services to address these deficits and reach maximum level of function.    Recent Surgery: * No surgery found *      Plan:     During this hospitalization, patient to be seen daily to address the identified rehab impairments via gait training, therapeutic activities, therapeutic exercises and progress toward the following goals:    · Plan of Care Expires:  06/07/22    Subjective     Chief Complaint: foot wound  Patient/Family Comments/goals: ambulation  Pain/Comfort:  ·        Objective:     Communicated with nurse prior to session.  Patient found supine with telemetry, peripheral IV upon PT entry to room.     General Precautions: Standard, fall   Orthopedic Precautions:LLE non weight bearing   Braces:    Respiratory Status: Room air     Functional Mobility:  · Bed Mobility:     · Supine to Sit: stand by assistance  · Transfers:     · Sit to Stand:  moderate assistance and of 2 persons with rolling walker      AM-PAC 6 CLICK MOBILITY          Therapeutic Activities and Exercises:   bed mobility, t/f training  Bed to chair with RW and Mod A  x2  NWB on L LE    Patient left up in chair with all lines intact, call button in reach and her   present..    GOALS:   Multidisciplinary Problems      Physical Therapy Goals        Problem: Physical Therapy    Goal Priority Disciplines Outcome Goal Variances Interventions   Physical Therapy Goal     PT, PT/OT Ongoing, Progressing     Description: Goals to be met by: 22     Patient will increase functional independence with mobility by performin. Supine to sit with Modified [place]  2. Sit to supine with Modified [place]  3. Sit to stand transfer with Contact Guard Assistance  4. Bed to chair transfer with Contact Guard Assistance using Rolling Walker maintaining NWB L LE  5. Wheelchair propulsion x150 feet with Supervision using bilateral uppper extremities                     Time Tracking:     PT Received On: 05/10/22  PT Start Time: 1130     PT Stop Time: 1141  PT Total Time (min): 11 min     Billable Minutes: Therapeutic Activity 11 minutes    Treatment Type: Treatment  PT/PTA: PT     PTA Visit Number: 0     05/10/2022

## 2022-05-10 NOTE — PLAN OF CARE
Problem: Occupational Therapy  Goal: Occupational Therapy Goal  Description: Goals to be met by: discharge     Patient will increase functional independence with ADLs by performing:    UE Dressing with Supervision.  LE Dressing with Supervision.  Grooming while seated with Supervision.  Toileting from toilet with Supervision for hygiene and clothing management.   Supine to sit with Supervision.  Toilet transfer to toilet with Supervision.  Perform sitting/standing ADL activity while maintaining LLE NWB with no verbal cues.    Outcome: Ongoing, Progressing

## 2022-05-10 NOTE — PLAN OF CARE
5/10/22 1500: per [place], auth has not yet been given by [facility].     Patient clinically accepted for LTAC services at [facility] per flor Friedman who is submitting for insurance authorization.        05/09/22 1450   Post-Acute Status   Post-Acute Authorization Placement   Post-Acute Placement Status Pending payor review/awaiting authorization (if required)

## 2022-05-10 NOTE — PROGRESS NOTES
[facility]  Podiatry  Progress Note    Patient Name: Leanna García  MRN: [id number]  Admission Date: 5/4/2022  Hospital Length of Stay: 6 days  Attending Physician: Usman Stacy MD  Primary Care Provider: Stefano Lopez MD     Subjective:     Interval History: Patient resting in bed.   Reviewed Infectious Disease note.  Discussed with patient that following a healthy diet is imperative to wound healing.  Discussed with her that sugary drinks and junk food should be completely eliminated from her diet if she wants to give herself the best chance of saving her leg.        Scheduled Meds:   sodium chloride 0.9%   Intravenous Once    sodium chloride 0.9%   Intravenous Once    ascorbic acid (vitamin C)  500 mg Oral Daily    aspirin  81 mg Oral Daily    azelastine  1 spray Nasal BID    calcium acetate(phosphat bind)  1,334 mg Oral TID WM    cetirizine  10 mg Oral Every other day    collagenase   Topical (Top) Daily    DAPTOmycin (CUBICIN)  IV  500 mg Intravenous Q48H    epoetin chris-epbx  100 Units/kg Intravenous Every Mon, Wed, Fri    fluticasone propionate  2 spray Each Nostril Daily    insulin detemir U-100  20 Units Subcutaneous QHS    metoprolol tartrate  25 mg Oral BID    multivitamin  1 tablet Oral Daily    piperacillin-tazobactam (ZOSYN) IVPB  3.375 g Intravenous Q12H     Continuous Infusions:  PRN Meds:sodium chloride, sodium chloride 0.9%, sodium chloride 0.9%, acetaminophen, benzonatate, dextrose 50%, dextrose 50%, guaiFENesin-codeine 100-10 mg/5 ml, heparin (porcine), HYDROcodone-acetaminophen, insulin aspart U-100, melatonin, naloxone, ondansetron, polyethylene glycol, sodium chloride 0.9%, sodium chloride 0.9%, sodium chloride 0.9%    Review of Systems  Objective:     Vital Signs (Most Recent):  Temp: (!) 100.4 °F (38 °C) (05/10/22 1608)  Pulse: 63 (05/10/22 1608)  Resp: 20 (05/10/22 1608)  BP: (!) 187/73 (05/10/22 1608)  SpO2: (!) 94 % (05/10/22 1608)   Vital Signs (24h  Range):  Temp:  [98 °F (36.7 °C)-100.4 °F (38 °C)] 100.4 °F (38 °C)  Pulse:  [54-76] 63  Resp:  [18-20] 20  SpO2:  [94 %-100 %] 94 %  BP: (118-187)/(49-73) 187/73     Weight: 82 kg (180 lb 12.4 oz)  Body mass index is 28.31 kg/m².    Foot Exam  Wound VAC intact did not disturb     Laboratory:  All pertinent labs reviewed within the last 24 hours.    Diagnostic Results:  I have reviewed all pertinent imaging results/findings within the past 24 hours.    Assessment/Plan:     Diabetic ulcer of left midfoot  Wound VAC changes twice weekly to both wounds on left foot.    Wilman ordered for nutrition for wound healing    Nonweightbearing to left foot     Counseled patient on increasing protein intake, not getting wound wet, keeping dressing clean dry and intact, following a healthy diet, removing pressure from wound      Recommend LTAC following discharge for IV antibiotics and wound care with wound VAC        Alivia Bruno DPM  Podiatry  [facility]

## 2022-05-10 NOTE — PLAN OF CARE
Problem: Adult Inpatient Plan of Care  Goal: Plan of Care Review  Outcome: Ongoing, Progressing  Goal: Patient-Specific Goal (Individualized)  Outcome: Ongoing, Progressing  Goal: Absence of Hospital-Acquired Illness or Injury  Outcome: Ongoing, Progressing  Goal: Optimal Comfort and Wellbeing  Outcome: Ongoing, Progressing  Goal: Readiness for Transition of Care  Outcome: Ongoing, Progressing     Problem: Diabetes Comorbidity  Goal: Blood Glucose Level Within Targeted Range  Outcome: Ongoing, Progressing     Problem: Adjustment to Illness (Sepsis/Septic Shock)  Goal: Optimal Coping  Outcome: Ongoing, Progressing     Problem: Bleeding (Sepsis/Septic Shock)  Goal: Absence of Bleeding  Outcome: Ongoing, Progressing     Problem: Glycemic Control Impaired (Sepsis/Septic Shock)  Goal: Blood Glucose Level Within Desired Range  Outcome: Ongoing, Progressing     Problem: Infection Progression (Sepsis/Septic Shock)  Goal: Absence of Infection Signs and Symptoms  Outcome: Ongoing, Progressing     Problem: Nutrition Impaired (Sepsis/Septic Shock)  Goal: Optimal Nutrition Intake  Outcome: Ongoing, Progressing     Problem: Fall Injury Risk  Goal: Absence of Fall and Fall-Related Injury  Outcome: Ongoing, Progressing     Problem: Skin Injury Risk Increased  Goal: Skin Health and Integrity  Outcome: Ongoing, Progressing     Problem: Device-Related Complication Risk (Hemodialysis)  Goal: Safe, Effective Therapy Delivery  Outcome: Ongoing, Progressing     Problem: Hemodynamic Instability (Hemodialysis)  Goal: Effective Tissue Perfusion  Outcome: Ongoing, Progressing     Problem: Infection (Hemodialysis)  Goal: Absence of Infection Signs and Symptoms  Outcome: Ongoing, Progressing     Problem: Impaired Wound Healing  Goal: Optimal Wound Healing  Outcome: Ongoing, Progressing     Problem: Infection  Goal: Absence of Infection Signs and Symptoms  Outcome: Ongoing, Progressing

## 2022-05-10 NOTE — NURSING
56 yr old female  Family at bedside  Large hard bm before placing the vac  NPWT dressing change to the left foot   No new pictures   Dressing removed and area cleaned  Roll of surgecel removed to get to the wound bed  Grey and slough 100%  3.5x3.5x2.5  Under mining 12oclock 2cm with redness to the periwound and pain with warmth   9oclock 2cm   6oclock 2cm under the second wound   applied santyl   Cut green foam x4 and placed with drape and dome  To suction at 150mmHg and holding

## 2022-05-10 NOTE — PROGRESS NOTES
INPATIENT NEPHROLOGY Progress Note  [facility] NEPHROLOGY    Leanna García  05/10/2022    Reason for consultation:    ESRD    Chief Complaint:   Chief Complaint   Patient presents with    Fever          History of Present Illness:    Per H and P    Ms. García is a 56-year-old female with a past medical history of ESRD on HD Monday Wednesday Friday, hypertension, IDDM2, PAF, and chronic cough who presents today with complaints of fever up to 103. It is severe.  It is associated with cough, posttussive vomiting, fatigue, sinus congestion, weakness, and a left foot ulcer.  She denies chest pain, diarrhea, dizziness, loss of consciousness.  In the ED she was noted to have a large left foot ulceration at the plantar surface and on the side of the foot.  CT of abdomen pelvis also reveals possible enteritis and bilateral perinephric fat stranding.  WBCs 21 K, troponin is 0.4 in the setting of ESRD.  She did not go to dialysis today she felt too bad.  Glucose is 446, anion gap 20, bicarb 24.     5/5  C/o foot pain.  Mild dyspnea.  No vomiting, chest pain, urinary or bowel complaints.  Weak  5/6  Currently getting wound vac placed.  Has foot pain. No respiratory distress  5/7 still spiking fevers.  Foot pain.  Stopped hd early yesterday  5/8  Tired today.  No vomiting or sob.  Tmax 101.9 at 1900 yesterday  5/9 wound vac today  5/10 no issues with HD yest- got 1u of blood- net UF 2.6L- waiting for wound vac- gave ok with PICC    Plan of Care:     ESRD on HD MWF  --continue dialysis per routine    Hypertension  --fluid restrict 1.5L/day  --low salt diet 2g/day  --uf with hd    Hyponatremia  --fluid restrict  --140 Na dialysate    SHPT  --check phos  --continue binder with meals     Anemia  --Hb is better after transfusion on 5/9  --continue FERMÍN with HD    Diab Foot Ulcer  --getting wound vac today, ok with PICC  --dose antbx for CrCl< 10/HD    Thank you for allowing us to participate in this patient's care. We will continue  to follow.    Vital Signs:  Temp Readings from Last 3 Encounters:   05/10/22 98.5 °F (36.9 °C) (Oral)   04/26/22 98.2 °F (36.8 °C)   03/10/22 97.3 °F (36.3 °C) (Tympanic)       Pulse Readings from Last 3 Encounters:   05/10/22 61   04/26/22 66   04/05/22 75       BP Readings from Last 3 Encounters:   05/10/22 (!) 143/64   04/28/22 (!) 180/82   04/26/22 (!) 174/66       Weight:  Wt Readings from Last 3 Encounters:   05/10/22 82 kg (180 lb 12.4 oz)   05/06/22 86.6 kg (191 lb)   04/28/22 94 kg (207 lb 5.5 oz)       Medications:  No current facility-administered medications on file prior to encounter.     Current Outpatient Medications on File Prior to Encounter   Medication Sig Dispense Refill    ascorbic acid, vitamin C, (VITAMIN C) 500 MG tablet Take 500 mg by mouth once daily.      aspirin (ECOTRIN) 81 MG EC tablet Take 81 mg by mouth once daily.      atorvastatin (LIPITOR) 80 MG tablet Take 1 tablet (80 mg total) by mouth once daily. (Patient taking differently: Take 80 mg by mouth every evening.) 90 tablet 3    blood sugar diagnostic Strp To check BG 4 times daily, to use with insurance preferred meter (Patient taking differently: 1 strip by Misc.(Non-Drug; Combo Route) route 4 (four) times daily. To check BG 4 times daily, to use with insurance preferred meter) 450 strip 3    insulin aspart U-100 (NOVOLOG FLEXPEN U-100 INSULIN) 100 unit/mL (3 mL) InPn pen Inject 5-8 units 3 times per day with food. 1 Box 6    insulin detemir U-100 (LEVEMIR FLEXTOUCH U-100 INSULN) 100 unit/mL (3 mL) InPn pen Inject 15-18 Units into the skin once daily. 1 Box 6    lancets Misc To use to check blood sugar 4 times daily. To use with insurance approved meter. Patient needs the round, purple one (Patient taking differently: 1 lancet by Misc.(Non-Drug; Combo Route) route 4 (four) times daily. To use to check blood sugar 4 times daily. To use with insurance approved meter. Patient needs the round, purple one) 450 each 3     "metoprolol tartrate (LOPRESSOR) 25 MG tablet Take 25 mg by mouth 2 (two) times daily.      minoxidiL (LONITEN) 2.5 MG tablet Take 5 mg by mouth 2 (two) times daily.      multivitamin (THERAGRAN) per tablet Take 1 tablet by mouth once daily.      pen needle, diabetic (BD ULTRA-FINE ROBERT PEN NEEDLE) 32 gauge x 5/32" Ndle To use 4 times per day with insulin injections. (Patient taking differently: 1 pen by Misc.(Non-Drug; Combo Route) route 4 (four) times daily. To use 4 times per day with insulin injections.) 450 each 2    sucroferric oxyhydroxide (VELPHORO) 500 mg Chew Take 500 mg by mouth 3 (three) times daily.      dextrose (GLUCOSE GEL) 40 % gel Take 37.5 mLs (15,000 mg total) by mouth once as needed (hypoglycemia). 37.5 g 4    gabapentin (NEURONTIN) 300 MG capsule Take 300 mg by mouth every evening.      glucagon (BAQSIMI) 3 mg/actuation Spry 3 mg (one actuation) into a single nostril; if no response, may repeat in 15 minutes using a new intranasal device. (Patient taking differently: 3 mg by Left Nostril route as needed. 3 mg (one actuation) into a single nostril; if no response, may repeat in 15 minutes using a new intranasal device.) 2 each 1    [DISCONTINUED] blood-glucose meter (ACCU-CHEK KAYLIE PLUS METER) Misc To use to check blood sugars 4 times a day 1 each 0    [DISCONTINUED] clorazepate (TRANXENE) 3.75 MG Tab Take 7.5 mg by mouth nightly as needed.       [DISCONTINUED] fenofibrate 160 MG Tab Take 1 tablet (160 mg total) by mouth once daily. 90 tablet 3    [DISCONTINUED] lancing device with lancets (ACCU-CHEK SOFT DEV LANCETS) Kit To check blood sugars 4 times per day 400 each 3     Scheduled Meds:   ascorbic acid (vitamin C)  500 mg Oral Daily    aspirin  81 mg Oral Daily    azelastine  1 spray Nasal BID    calcium acetate(phosphat bind)  1,334 mg Oral TID WM    cetirizine  10 mg Oral Every other day    collagenase   Topical (Top) Daily    DAPTOmycin (CUBICIN)  IV  500 mg Intravenous " "Q48H    epoetin chris-epbx  100 Units/kg Intravenous Every Mon, Wed, Fri    fluticasone propionate  2 spray Each Nostril Daily    insulin detemir U-100  20 Units Subcutaneous QHS    metoprolol tartrate  25 mg Oral BID    multivitamin  1 tablet Oral Daily    piperacillin-tazobactam (ZOSYN) IVPB  3.375 g Intravenous Q12H     Continuous Infusions:  PRN Meds:.sodium chloride, acetaminophen, benzonatate, dextrose 50%, dextrose 50%, guaiFENesin-codeine 100-10 mg/5 ml, HYDROcodone-acetaminophen, insulin aspart U-100, melatonin, naloxone, ondansetron, polyethylene glycol, sodium chloride 0.9%    Review of Systems:  Neg    Physical Exam:    BP (!) 143/64 (BP Location: Right arm, Patient Position: Lying)   Pulse 61   Temp 98.5 °F (36.9 °C) (Oral)   Resp 20   Ht 5' 7" (1.702 m)   Wt 82 kg (180 lb 12.4 oz)   SpO2 96%   BMI 28.31 kg/m²     Constitutional: nad, aao x 3  Heart: rrr, no m/r/g, wwp, no edema  Lungs: ctab, no w/r/r/c, no lb  Abdomen: s/nt/nd, +BS    Results:  Lab Results   Component Value Date     (L) 05/10/2022    K 3.8 05/10/2022    CL 95 05/10/2022    CO2 22 (L) 05/10/2022    BUN 52 (H) 05/10/2022    CREATININE 6.5 (H) 05/10/2022    CALCIUM 9.1 05/10/2022    ANIONGAP 16 05/10/2022    ESTGFRAFRICA 7.6 (A) 05/10/2022    EGFRNONAA 6.6 (A) 05/10/2022       Lab Results   Component Value Date    CALCIUM 9.1 05/10/2022    PHOS 4.2 03/10/2022       Recent Labs   Lab 05/10/22  0443   WBC 18.47*   RBC 3.04*   HGB 8.5*   HCT 26.8*      MCV 88   MCH 28.0   MCHC 31.7*        Imaging Results          X-Ray Foot Complete Left (Final result)  Result time 05/05/22 06:12:52    Final result by Denise Estrada MD (05/05/22 06:12:52)                 Narrative:    Three views of the left foot are compared to prior study dated 12/3/2021    Clinical history is infection    There is osteopenia. There has been prior amputation of the fourth digit at the base of the proximal phalanx.    There are moderate " degenerative changes of the midfoot and hindfoot with joint space narrowing and spurring.. There are no fractures, dislocations or osteolysis. There is a subcutaneous ulceration the mid plantar soft tissues. There is moderate peripheral vascular calcification.    IMPRESSION: Prior amputation of fourth digit without radiographic evidence for osteomyelitis.    Moderate degenerative changes of the foot    16mm soft tissue ulceration in the mid plantar medial soft tissues    Electronically signed by:  Denise Estrada MD  5/5/2022 6:12 AM CDT Workstation: [id number]                             CT Abdomen Pelvis  Without Contrast (Final result)  Result time 05/04/22 21:53:02    Final result by Ernesto Lino MD (05/04/22 21:53:02)                 Narrative:    EXAM: CT ABDOMEN PELVIS WITHOUT CONTRAST    RadLex: CT ABDOMEN PELVIS WITHOUT IV CONTRAST    HISTORY: 56 years  Female;  Abdominal pain, acute, nonlocalized;    TECHNIQUE:   Standard CT of the abdomen and pelvis was performed without the use of intravenous contrast media. Lack of intravenous contrast limits evaluation of soft tissue structures in this study.    CT scans at this facility use dose modulation, iterative reconstruction and/or weight based dosing when appropriate to reduce radiation dose to as low as reasonably achievable.    COMPARISON: None available    FINDING(S):    ABDOMEN:    Lung bases show dependent atelectasis. Coronary artery calcifications are seen. Cardiac size normal. Mild thickening of the distal esophagus with small hiatal hernia.    Small amount of fluid is seen in the stomach. Mild thickening of the stomach lining.    Mild hepatic steatosis. Spleen, pancreas, and both adrenals are normal.    There is gallbladder distention with multiple gallstones.    Neither kidney shows hydronephrosis. There is moderate bilateral perinephric stranding which appears above expected and may represent inflammation. Correlate with urinalysis to exclude  the possibility of pyelonephritis.    There are vascular calcifications involving both kidneys. There may be separate small stones as well. The ureters themselves are decompressed.    Marked atherosclerosis affects the aorta and the major branch vessels. No abdominal lymphadenopathy or free abdominal fluid.    The small intestines contain a small to moderate amount of fluid with scattered air-fluid levels. No transition point to suggest obstruction.    No evidence of appendicitis. Oral contrast is seen in the intestines. There is a mild to moderate stool burden. Scattered colonic diverticulosis.    PELVIS:    Bladder is decompressed. No free fluid within the pelvis. There is an enlarged left external iliac lymph node measuring 1.6 x 0.8 cm on series 3, image 210.    No other groin or pelvis lymphadenopathy. A small left inguinal hernia contains only fat.    Small umbilical hernia contains only fat.    There are degenerative changes in the lower lumbar spine.    IMPRESSION:    1.  Small to moderate amount of fluid in the small intestines with scattered air fluid levels, possibly enteritis  2.  Moderate bilateral perinephric stranding. Correlate with urinalysis to exclude the possibility of pyelonephritis  3.  Gallbladder distention with gallstones    Electronically signed by:  Ernesto Lino MD  5/4/2022 9:53 PM CDT Workstation: [id number]                             X-Ray Chest 1 View (Final result)  Result time 05/05/22 06:10:56    Final result by Denise Estrada MD (05/05/22 06:10:56)                 Narrative:    XR CHEST 1 VIEW    CLINICAL HISTORY:  56 years Female pain    COMPARISON: 5/19/2021    FINDINGS: Cardiomediastinal silhouette is within normal limits. Lungs are normally expanded with no airspace consolidation. No pleural effusion or pneumothorax. No acute osseous abnormality.    IMPRESSION: No acute pulmonary process.    Electronically signed by:  Denise Estrada MD  5/5/2022 6:10 AM CDT Workstation:  [id number]                                I have personally reviewed pertinent radiological imaging and reports.     Patient care was time spent personally by me on the following activities: > 35 min  · Obtaining a history  · Examination of patient.  · Providing medical care at the patients bedside.  · Developing a treatment plan with patient or surrogate and bedside caregivers  · Ordering and reviewing laboratory studies, radiographic studies, pulse oximetry.  · Ordering and performing treatments and interventions.  · Evaluation of patient's response to treatment.  · Discussions with consultants while on the unit and immediately available to the patient.  · Re-evaluation of the patient's condition.  · Documentation in the medical record.     Ashleigh Soria MD MPH  [facility] [place]  [address] [place] [id number]  [phone] (p)  [phone] (f)

## 2022-05-10 NOTE — RESPIRATORY THERAPY
05/09/22 2149   PRE-TX-O2   O2 Device (Oxygen Therapy) nasal cannula   $ Is the patient on Low Flow Oxygen? Yes   Flow (L/min) 2   SpO2 99 %   Pulse Oximetry Type Intermittent   $ Pulse Oximetry - Multiple Charge Pulse Oximetry - Multiple   Education   $ Education 15 min   Respiratory Evaluation   $ Care Plan Tech Time 15 min

## 2022-05-10 NOTE — PLAN OF CARE
Problem: Physical Therapy  Goal: Physical Therapy Goal  Description: Goals to be met by: 22     Patient will increase functional independence with mobility by performin. Supine to sit with Modified Lamar  2. Sit to supine with Modified Lamar  3. Sit to stand transfer with Contact Guard Assistance  4. Bed to chair transfer with Contact Guard Assistance using Rolling Walker maintaining NWB L LE  5. Wheelchair propulsion x150 feet with Supervision using bilateral uppper extremities    Outcome: Ongoing, Progressing

## 2022-05-10 NOTE — SUBJECTIVE & OBJECTIVE
Interval History:     Review of Systems   Constitutional:  Negative for activity change and appetite change.   HENT:  Negative for congestion and dental problem.    Eyes:  Negative for discharge and itching.   Respiratory:  Negative for shortness of breath.    Cardiovascular:  Negative for chest pain.   Gastrointestinal:  Negative for abdominal distention and abdominal pain.   Endocrine: Negative for cold intolerance.   Genitourinary:  Negative for difficulty urinating and dysuria.   Musculoskeletal:  Negative for arthralgias and back pain.   Skin:  Negative for color change.   Neurological:  Negative for dizziness and facial asymmetry.   Hematological:  Negative for adenopathy.   Psychiatric/Behavioral:  Negative for agitation and behavioral problems.    Objective:     Vital Signs (Most Recent):  Temp: 98.6 °F (37 °C) (05/09/22 1845)  Pulse: 73 (05/09/22 1900)  Resp: 18 (05/09/22 1740)  BP: 131/61 (05/09/22 1900)  SpO2: 100 % (05/09/22 1740) Vital Signs (24h Range):  Temp:  [97.7 °F (36.5 °C)-101.7 °F (38.7 °C)] 98.6 °F (37 °C)  Pulse:  [52-74] 73  Resp:  [18-20] 18  SpO2:  [95 %-100 %] 100 %  BP: (115-160)/(51-74) 131/61     Weight: 89 kg (196 lb 3.4 oz)  Body mass index is 30.73 kg/m².    Intake/Output Summary (Last 24 hours) at 5/9/2022 1908  Last data filed at 5/9/2022 1845  Gross per 24 hour   Intake 664 ml   Output 0 ml   Net 664 ml      Physical Exam  Vitals and nursing note reviewed.   Constitutional:       General: She is not in acute distress.  HENT:      Head: Atraumatic.      Right Ear: External ear normal.      Left Ear: External ear normal.      Nose: Nose normal.      Mouth/Throat:      Mouth: Mucous membranes are moist.   Eyes:      General: No scleral icterus.  Cardiovascular:      Rate and Rhythm: Normal rate.   Pulmonary:      Effort: Pulmonary effort is normal.   Abdominal:      General: There is no distension.   Musculoskeletal:         General: Normal range of motion.      Cervical back:  Normal range of motion.      Comments: Foot bandage intact   Skin:     General: Skin is warm.   Neurological:      Mental Status: She is alert and oriented to person, place, and time.   Psychiatric:         Behavior: Behavior normal.       Significant Labs: All pertinent labs within the past 24 hours have been reviewed.  CBC:   Recent Labs   Lab 05/08/22  0723 05/09/22  0540   WBC 17.57* 19.57*   HGB 7.0* 6.7*   HCT 23.3* 22.1*    337     CMP:   Recent Labs   Lab 05/08/22  0723 05/09/22  0540   * 133*   K 4.4 4.7   CL 95 96   CO2 29 21*   * 135*   BUN 71* 89*   CREATININE 10.1* 11.3*   CALCIUM 9.6 9.6   ANIONGAP 11 16   EGFRNONAA 3.9* 3.4*       Significant Imaging: I have reviewed all pertinent imaging results/findings within the past 24 hours.

## 2022-05-10 NOTE — PROGRESS NOTES
NMConsult Note  Infectious Disease    Reason for Consult:  Bacillus cereus bacteremia, and MRSA osteomyelitis     HPI: Leanna García is a 56 y.o. female known to me from prior consultation in September of 2018, was admitted through the emergency room yesterday with fever 103,   Vomiting of 1 day's duration, and chronic sinus congestion, postnasal drip, cough at least several weeks duration and a foot ulcer on the plantar/medial surface surface of the left foot of probably several months duration.  The vomiting began within a few hours of a meal at a [employer] restaurant.  She did not have any diarrhea In the emergency room she had a CT scan of the abdomen which suggested possible enteritis and bilateral perinephric fat stranding, as well as cholelithiasis, and extensive vascular calcifications.  her blood sugar was 446, white blood cells 21,000, normal lactic acid.  Photographs taken in the emergency room suggest cellulitis of the medial and plantar foot She was started on Zosyn and doxycycline as she has history of vancomycin allergy. .  She was seen by Podiatry , and MRI did not demonstrate any abscess or drainage and wound care was ordered.  She was noted this morning to have difficulty swallowing and was seen by speech therapy.  Today 1 anaerobic bottle has a Gram-positive mariana prompting this consult.    5/6: interim reviewed. Still febrile through last night. Blood culture does have characteristics of Bacillus cereus. This will be sent out to [facility] for MALDI ID. Respiratory pcr panel was negative. She was able to eat solid food this am. She denies abdominal pain. Podiatry debrided her foot this am and submitted cultures and is ordering a WBC Scan. Coughing less. No wheezes.     5/7 (Daren):  Interim reviewed, discussed with Dr Bailon.  Patient seen and examined at bedside, states she does not feel good today, feels like she got something that is making her feel off, not her usual self.  Labile BP  121/58, low-grade fever T-max 102° yesterday, currently 100.8.  Wound cultures from left foot grew Staph aureus, pending sensitivities.  She is currently on Daptomycin, Zosyn and levofloxacin IV.    5/8: Interim reviewed, patient seen and examined at bedside. Feeling significantly better compared to yesterday.  Having lunch at bedside.  Hemodynamically stable, T-max 103° yesterday, currently afebrile.  Labs reviewed, white count 17, PMN 79.2%, no bands.  H&H 7/23.3, platelet count 332. Sodium 135, potassium 4.4.  Status post further debridement at bedside today by Podiatry, large deep tissue abscess seen on the plantar facial below the flexor tendon.  Mild necrosis noted and all nonviable tissue was debrided, cultures in process.  Micro updated, confirmed Bacillus cereus bacteremia, wound culture from left foot grew MRSA.    5/9 (Adamaris):  Interim reviewed.  Blood culture isolate confirmed to be bacillus serious, likely from the restaurant that she ate before the onset of her illness.  Wound cultures from 05/16//8 have MRSA.  As she is allergic to vancomycin she is on daptomycin.  Echocardiogram negative temperature is improved since the drainage of her foot though she did have a 101 temperature last night.  White blood cells remain elevated, hemoglobin 6.7, she is receiving blood, CRP is down about 10%. Not getting out of bed. Sugary soft drinks on her bedside table.   5/10: interim reviewed. Fever has finally resolved. Received blood with dialysis yesterday. Foot cultures have grown MSSA and MRSA. Currently struggling with constipation, miserable, on bedpan.       EXAM & DIAGNOSTICS REVIEWED:  4 in going home   Vitals:     Temp:  [98 °F (36.7 °C)-99.3 °F (37.4 °C)]   Temp: 98 °F (36.7 °C) (05/10/22 1221)  Pulse: 61 (05/10/22 1221)  Resp: 20 (05/10/22 1221)  BP: (!) 153/68 (05/10/22 1221)  SpO2: 96 % (05/10/22 1221)    Intake/Output Summary (Last 24 hours) at 5/10/2022 1418  Last data filed at 5/10/2022  0930  Gross per 24 hour   Intake 1804 ml   Output 3500 ml   Net -1696 ml     Exam 5/9  General:  In NAD. Alert and attentive, cooperative, comfortable on room air  Eyes:  Anicteric,  EOMI  ENT:  No ulcers, exudates, thrush, nares patent, edentulous  Neck:  Supple,   Lungs: Clear  Heart:  RRR, 2/6 systolic murmur +  Abd:  Soft, NT, mildly distended, normal BS, no masses or organomegaly appreciated.  :  Voids  no flank tenderness  Musc:  Joints without effusion, swelling, erythema, synovitis but she does have lower extremity muscle wasting.  She has Charcot arthropathy with fallen arches bilaterally and an ulceration on the medial arch   Skin:  No rashes   Neuro:Alert, attentive, speech fluent, face symmetric, moves all extremities, no focal weakness.  Minimally Ambulatory at baseline  Psych:  Calm, cooperative  Lymphatic:        Extrem: Left foot with wound vac in place     No edema, erythema, phlebitis, warm and well perfused  VAD:  Left arm AV fistula  No redness     Isolation: contact - MRSA  Wound:           5/6:   This was debrided full thickness this am    5/8:        5/9: bandage changed, packing not disturbed    General Labs reviewed:  Recent Labs   Lab 05/08/22  0723 05/09/22  0540 05/10/22  0443   WBC 17.57* 19.57* 18.47*   HGB 7.0* 6.7* 8.5*   HCT 23.3* 22.1* 26.8*    337 378       Recent Labs   Lab 05/04/22  2040 05/05/22  0320 05/08/22  0723 05/09/22  0540 05/10/22  0443   *   < > 135* 133* 133*   K 3.2*   < > 4.4 4.7 3.8   CL 84*   < > 95 96 95   CO2 24   < > 29 21* 22*   BUN 51*   < > 71* 89* 52*   CREATININE 9.9*   < > 10.1* 11.3* 6.5*   CALCIUM 8.8   < > 9.6 9.6 9.1   PROT 8.0  --   --   --   --    BILITOT 0.9  --   --   --   --    ALKPHOS 60  --   --   --   --    ALT 11  --   --   --   --    AST 13  --   --   --   --     < > = values in this interval not displayed.     Recent Labs   Lab 05/04/22 2040 05/08/22  0723   CRP 32.78* 23.11*         Micro:  Microbiology Results (last 7  days)     Procedure Component Value Units Date/Time    Blood culture [[id number]] Collected: 05/06/22 1100    Order Status: Completed Specimen: Blood Updated: 05/10/22 1232     Blood Culture, Routine No Growth to date      No Growth to date      No Growth to date      No Growth to date      No Growth to date    Narrative:      Collection has been rescheduled by RE1 at 05/06/2022 08:51 Reason:   Having a scan  Collection has been rescheduled by SLR at 05/06/2022 10:27 Reason:   Unable to collect  Collection has been rescheduled by RE1 at 05/06/2022 10:35 Reason:   Unable to collect  Collection has been rescheduled by RE1 at 05/06/2022 08:51 Reason:   Having a scan  Collection has been rescheduled by SLR at 05/06/2022 10:27 Reason:   Unable to collect  Collection has been rescheduled by RE1 at 05/06/2022 10:35 Reason:   Unable to collect    Blood culture [[id number]] Collected: 05/08/22 0723    Order Status: Completed Specimen: Blood Updated: 05/10/22 1032     Blood Culture, Routine No Growth to date      No Growth to date      No Growth to date    Narrative:      Collection has been rescheduled by SLR at 05/08/2022 06:15 Reason:   Unable to collect  Collection has been rescheduled by RE1 at 05/08/2022 06:55 Reason:   Unable to collect   Collection has been rescheduled by SLR at 05/08/2022 06:15 Reason:   Unable to collect  Collection has been rescheduled by RE1 at 05/08/2022 06:55 Reason:   Unable to collect     Tissue culture [[id number]]  (Abnormal)  (Susceptibility) Collected: 05/08/22 1003    Order Status: Completed Specimen: Tissue Updated: 05/10/22 0650     Aerobic Culture - Tissue STAPHYLOCOCCUS AUREUS  Rare       Gram Stain Result Moderate WBC's      No organisms seen    Blood culture #2 **CANNOT BE ORDERED STAT** [[id number]] Collected: 05/04/22 2155    Order Status: Completed Specimen: Blood from Peripheral, Antecubital, Right Updated: 05/09/22 2232     Blood Culture, Routine No growth after 5 days.    Blood  culture [[id number]]  (Abnormal) Collected: 05/04/22 2040    Order Status: Completed Specimen: Blood Updated: 05/09/22 1604     Blood Culture, Routine Gram stain lukasz bottle: Gram positive rods      Results called to and read back by:Landy Nation RN-[id number];  05/05/2022        10:52 CJD      BACILLUS CEREUS    Culture, Anaerobic [[id number]] Collected: 05/08/22 1003    Order Status: Sent Specimen: Abscess from Foot, Left Updated: 05/08/22 1012    Culture, Anaerobic [[id number]] Collected: 05/06/22 0753    Order Status: Completed Specimen: Wound from Foot, Left Updated: 05/08/22 1011     Anaerobic Culture No anaerobes isolated    Aerobic culture [[id number]] Collected: 05/08/22 1003    Order Status: Canceled Specimen: Tissue from Foot, Left     Aerobic culture [[id number]]  (Abnormal)  (Susceptibility) Collected: 05/06/22 0753    Order Status: Completed Specimen: Wound from Foot, Left Updated: 05/08/22 0832     Aerobic Bacterial Culture METHICILLIN RESISTANT STAPHYLOCOCCUS AUREUS  Many  Results called to and read back by Kathy Maki RN-BCARD;    05/08/2022  08:32 CJD      Culture, ID (Consult) [[id number]] Collected: 05/04/22 2040    Order Status: Completed Specimen: Blood Updated: 05/07/22 1422     Culture, Identification (consult) Bacillus cereus    Respiratory Infection Panel (PCR), Nasopharyngeal [[id number]] Collected: 05/05/22 1826    Order Status: Completed Specimen: Nasopharyngeal Swab Updated: 05/05/22 2126     Respiratory Infection Panel Source NP swab     Adenovirus Not Detected     Coronavirus 229E, Common Cold Virus Not Detected     Coronavirus HKU1, Common Cold Virus Not Detected     Coronavirus NL63, Common Cold Virus Not Detected     Coronavirus OC43, Common Cold Virus Not Detected     Comment: The Coronavirus strains detected in this test cause the common cold.  These strains are not the COVID-19 (novel Coronavirus)strain   associated with the respiratory disease outbreak.          SARS-CoV2  (COVID-19) Qualitative PCR Not Detected     Human Metapneumovirus Not Detected     Human Rhinovirus/Enterovirus Not Detected     Influenza A (subtypes H1, H1-2009,H3) Not Detected     Influenza B Not Detected     Parainfluenza Virus 1 Not Detected     Parainfluenza Virus 2 Not Detected     Parainfluenza Virus 3 Not Detected     Parainfluenza Virus 4 Not Detected     Respiratory Syncytial Virus Not Detected     Bordetella Parapertussis (WL3763) Not Detected     Bordetella pertussis (ptxP) Not Detected     Chlamydia pneumoniae Not Detected     Mycoplasma pneumoniae Not Detected     Comment: Respiratory Infection Panel testing performed by Multiplex PCR.       Narrative:      Respiratory Infection Panel source->NP Swab    Blood culture #1 **CANNOT BE ORDERED STAT** [[id number]] Collected: 05/04/22 2040    Order Status: Canceled Specimen: Blood from Peripheral, Antecubital, Right           Imaging Reviewed:   CXR   CT abdomen and pelvis 5/4   1.  Small to moderate amount of fluid in the small intestines with scattered air fluid levels, possibly enteritis 2.  Moderate bilateral perinephric stranding. Correlate with urinalysis to exclude the possibility of pyelonephritis 3.  Gallbladder distention with gallstones     MRI of the left midfoot 5/5  IMPRESSION: 16mm skin ulceration in the medial plantar soft tissues with associated cellulitis. There is no abscess or osteomyelitis   Moderate degenerative changes of the midfoot    NM scan Abnormal indium-111 WBC uptake along medial left foot suggesting source of infection.     Cardiology:   ECHO 5/6/22  The left ventricle is normal in size with normal systolic function.  The estimated ejection fraction is 65%.  Normal left ventricular diastolic function.  Normal right ventricular size with normal right ventricular systolic function.  Mild mitral regurgitation.  All valves visualized, normal.    IMPRESSION & PLAN     1. Bacillus cereus bacteremia likely in the setting of recent  Chinese food ingestion   Blood cultures 5/6 & 5/8 no growth, pending final   Echocardiogram negative   [facility] department notified    2. Left foot ulcer, deep tissue abscess/osteomyelitis status post debridement by Podiatry 5/6 and 5/8, deep pocket of pus drained 5/9   Wound culture 5/6 MRSA and MSSA    3. ESRD on HD,  Nephrology following    4.  Diabetes with hyperglycemia, A1c 14%  5. constipation     Recommendations:    Continue Daptomycin 500 mg IV q48h for MRSA, MSSA and Bacillus cereus  Weekly cpk, hold statins while on daptomycin  Continue Zosyn 4.5 g IV q.12 until 5/13, then stop     Patient will benefit from LTAC to continue wound care to left foot with wound VAC and IV antibiotics.    enema    Counseled to avoid sugary soft drinks. Needs aggressive glycemic control  D/w  Patient      Will follow peripherally while here and can f/u with me in clinic in 4-6 weeks    Medical Decision Making during this encounter was  [_] Low Complexity  [_] Moderate Complexity  [x  ] High Complexity

## 2022-05-10 NOTE — PROGRESS NOTES
[facility] Medicine  Progress Note    Patient Name: Leanna García  MRN: [id number]  Patient Class: IP- Inpatient   Admission Date: 5/4/2022  Length of Stay: 5 days  Attending Physician: Usman Stacy MD  Primary Care Provider: Stefano Lopez MD        Subjective:     Principal Problem:Bacteremia        HPI:  Ms. García is a 56-year-old female with a past medical history of ESRD on HD Monday Wednesday Friday, hypertension, IDDM2, PAF, and chronic cough who presents today with complaints of fever up to 103. It is severe.  It is associated with cough, posttussive vomiting, fatigue, sinus congestion, weakness, and a left foot ulcer.  She denies chest pain, diarrhea, dizziness, loss of consciousness.  In the ED she was noted to have a large left foot ulceration at the plantar surface and on the side of the foot.  CT of abdomen pelvis also reveals possible enteritis and bilateral perinephric fat stranding.  WBCs 21 K, troponin is 0.4 in the setting of ESRD.  She did not go to dialysis today she felt too bad.  Glucose is 446, anion gap 20, bicarb 24.       Overview/Hospital Course:  05/05  Had HD today  MRI r/o osteomyelitis  Afebrile now    05/06  Had febrile episodes overnight  Blood cultures send to [facility]  Had dialysis today  Awaiting WBC scan     05/07  WBC scan showed medial left foot infection  C/o extreme fatigue/weakness    05/08  Pt having on and off febrile episodes at night  Pt today had bedside debridement of L foot abscess     05/09  Pt had HD today  Febrile episodes eprsists  Receive done unit of blood       Interval History:     Review of Systems   Constitutional:  Negative for activity change and appetite change.   HENT:  Negative for congestion and dental problem.    Eyes:  Negative for discharge and itching.   Respiratory:  Negative for shortness of breath.    Cardiovascular:  Negative for chest pain.   Gastrointestinal:  Negative for abdominal distention and abdominal pain.    Endocrine: Negative for cold intolerance.   Genitourinary:  Negative for difficulty urinating and dysuria.   Musculoskeletal:  Negative for arthralgias and back pain.   Skin:  Negative for color change.   Neurological:  Negative for dizziness and facial asymmetry.   Hematological:  Negative for adenopathy.   Psychiatric/Behavioral:  Negative for agitation and behavioral problems.    Objective:     Vital Signs (Most Recent):  Temp: 98.6 °F (37 °C) (05/09/22 1845)  Pulse: 73 (05/09/22 1900)  Resp: 18 (05/09/22 1740)  BP: 131/61 (05/09/22 1900)  SpO2: 100 % (05/09/22 1740) Vital Signs (24h Range):  Temp:  [97.7 °F (36.5 °C)-101.7 °F (38.7 °C)] 98.6 °F (37 °C)  Pulse:  [52-74] 73  Resp:  [18-20] 18  SpO2:  [95 %-100 %] 100 %  BP: (115-160)/(51-74) 131/61     Weight: 89 kg (196 lb 3.4 oz)  Body mass index is 30.73 kg/m².    Intake/Output Summary (Last 24 hours) at 5/9/2022 1908  Last data filed at 5/9/2022 1845  Gross per 24 hour   Intake 664 ml   Output 0 ml   Net 664 ml      Physical Exam  Vitals and nursing note reviewed.   Constitutional:       General: She is not in acute distress.  HENT:      Head: Atraumatic.      Right Ear: External ear normal.      Left Ear: External ear normal.      Nose: Nose normal.      Mouth/Throat:      Mouth: Mucous membranes are moist.   Eyes:      General: No scleral icterus.  Cardiovascular:      Rate and Rhythm: Normal rate.   Pulmonary:      Effort: Pulmonary effort is normal.   Abdominal:      General: There is no distension.   Musculoskeletal:         General: Normal range of motion.      Cervical back: Normal range of motion.      Comments: Foot bandage intact   Skin:     General: Skin is warm.   Neurological:      Mental Status: She is alert and oriented to person, place, and time.   Psychiatric:         Behavior: Behavior normal.       Significant Labs: All pertinent labs within the past 24 hours have been reviewed.  CBC:   Recent Labs   Lab 05/08/22  0723 05/09/22  0540   WBC  17.57* 19.57*   HGB 7.0* 6.7*   HCT 23.3* 22.1*    337     CMP:   Recent Labs   Lab 05/08/22  0723 05/09/22  0540   * 133*   K 4.4 4.7   CL 95 96   CO2 29 21*   * 135*   BUN 71* 89*   CREATININE 10.1* 11.3*   CALCIUM 9.6 9.6   ANIONGAP 11 16   EGFRNONAA 3.9* 3.4*       Significant Imaging: I have reviewed all pertinent imaging results/findings within the past 24 hours.      Assessment/Plan:      * Bacteremia  Gram positive rods on BC X 1 :Bacillus cereus   Had enteritis/Food poisoning which has been resolved(ate chinese food before onset of symptoms)  Repeat Blood cultures so far normal      Sepsis  Bacillus cereus bacteremia along with L foot abscess       Abscess of left foot  S/p Incision/drainage/debridement of  Left foot deep tissue abscess below fascia muscle and tendon      Diabetic ulcer of left midfoot  Wound care  MRI r/o osteomyelitis  WBC scan showed medial left foot as source of infection  S/p Incision/drainage/debridement of  Left foot deep tissue abscess below fascia muscle and tendon    Chronic anemia  pRBC if needed along with Dialysis sessions  Received one unit of blood on 05/09      Diabetic foot infection  As above         Foot infection  As above       Cough  Symptomatic management      Acute bronchitis  Symptomatic management      Elevated troponin  Likely demand ischemia in the setting of ESRD        Hypokalemia  Stable     Enteritis  Resolved     Type 2 diabetes mellitus with kidney complication, with long-term current use of insulin  Maintain present regime       ESRD (end stage renal disease)  HD sessions as scheduled         VTE Risk Mitigation (From admission, onward)         Ordered     IP VTE HIGH RISK PATIENT  Once         05/05/22 0003     Place sequential compression device  Until discontinued         05/05/22 0003                Discharge Planning   ERI: 5/12/2022     Code Status: Full Code   Is the patient medically ready for discharge?: No    Reason for  patient still in hospital (select all that apply): Treatment and Pending disposition  Discharge Plan A: Long-term acute care facility (LTAC)   Discharge Delays: None known at this time              Usman Stacy MD  Department of Hospital Medicine   [facility] [place]

## 2022-05-10 NOTE — PT/OT/SLP PROGRESS
Occupational Therapy   Treatment    Name: Leanna García  MRN: [id number]  Admitting Diagnosis:  Bacteremia       Recommendations:     Discharge Recommendations: LTACH (long-term acute care hospital), nursing facility, skilled  Discharge Equipment Recommendations:   (TBD)  Barriers to discharge:  Decreased caregiver support    Assessment:     Leanna García is a 56 y.o. female with a medical diagnosis of Bacteremia.  She presents with improving medical acuity and functional mobility. Patient performed bed mobility, unsupported sitting EOB, LB dressing sitting EOB and sit to stand x2 trials using rolling walker. Performance deficits affecting function are weakness, impaired endurance, impaired self care skills, impaired functional mobilty, gait instability, impaired balance, impaired cardiopulmonary response to activity.     Rehab Prognosis:  Fair; patient would benefit from acute skilled OT services to address these deficits and reach maximum level of function.       Plan:     Patient to be seen 5 x/week to address the above listed problems via self-care/home management, therapeutic activities, therapeutic exercises  · Plan of Care Expires: 06/08/22  · Plan of Care Reviewed with: patient, spouse    Subjective     Pain/Comfort:  · Pain Rating 1: 0/10  · Pain Rating Post-Intervention 1: 0/10    Objective:     Communicated with: nurse prior to session.  Patient found HOB elevated with telemetry, peripheral IV, oxygen, pulse ox (continuous) upon OT entry to room.    General Precautions: Standard, fall   Orthopedic Precautions:LLE non weight bearing   Braces: N/A  Respiratory Status: Nasal cannula, flow 2 L/min     Occupational Performance:     Bed Mobility:    · Patient completed Scooting/Bridging with contact guard assistance  · Patient completed Supine to Sit with contact guard assistance  · Patient completed Sit to Supine with contact guard assistance     Functional Mobility/Transfers:  · Patient completed Sit <>  Stand x2 tirials with minimum assistance  with  hand-held assist and rolling walker     Activities of Daily Living:  · Lower Body Dressing: contact guard assistance to don/doff sock on right foot sitting EOB.      [facility] 6 Click ADL: 19    Treatment & Education:  Patient given verbal/tacile cues for NWB on LLE during bed mobility and standing trails.    Patient left HOB elevated with all lines intact, call button in reach and bed alarm onEducation:      GOALS:   Multidisciplinary Problems     Occupational Therapy Goals        Problem: Occupational Therapy    Goal Priority Disciplines Outcome Interventions   Occupational Therapy Goal     OT, PT/OT Ongoing, Progressing    Description: Goals to be met by: discharge     Patient will increase functional independence with ADLs by performing:    UE Dressing with Supervision.  LE Dressing with Supervision.  Grooming while seated with Supervision.  Toileting from toilet with Supervision for hygiene and clothing management.   Supine to sit with Supervision.  Toilet transfer to toilet with Supervision.  Perform sitting/standing ADL activity while maintaining LLE NWB with no verbal cues.                     Time Tracking:     OT Date of Treatment: 05/10/22  OT Start Time: 1024  OT Stop Time: 1047  OT Total Time (min): 23 min    Billable Minutes:Self Care/Home Management 11  Therapeutic Activity 12    OT/HARVEY: OT          5/10/2022

## 2022-05-10 NOTE — ASSESSMENT & PLAN NOTE
Gram positive rods on BC X 1 :Bacillus cereus   Had enteritis/Food poisoning which has been resolved(ate chinese food before onset of symptoms)  Repeat Blood cultures so far normal

## 2022-05-10 NOTE — PROGRESS NOTES
05/09/22 2100   Handoff Report   Received From ABI Gomez   Given To ABI Zhou   Vital Signs   Temp 98.6 °F (37 °C)   Pulse 74   Resp 18   /60   Post-Hemodialysis Assessment   Rinseback Volume (mL) 250 mL   Blood Volume Processed (Liters) 68.1 L   Dialyzer Clearance Lightly streaked   Duration of Treatment 180 minutes   Additional Fluid Intake (mL) 900 mL   Total UF (mL) 3500 mL   Net Fluid Removal 2600   Patient Response to Treatment tolerated well, received 1 unit PRBCs   Arterial bleeding stop time (min) 5 min   Venous bleeding stop time (min) 5 min   Post-Hemodialysis Comments tx completed without incident

## 2022-05-11 ENCOUNTER — TELEPHONE (OUTPATIENT)
Dept: TRANSPLANT | Facility: CLINIC | Age: 57
End: 2022-05-11
Payer: MEDICAID

## 2022-05-11 PROBLEM — Z75.8 DISCHARGE PLANNING ISSUES: Status: ACTIVE | Noted: 2022-05-11

## 2022-05-11 PROBLEM — Z02.9 DISCHARGE PLANNING ISSUES: Status: ACTIVE | Noted: 2022-05-11

## 2022-05-11 LAB
ANION GAP SERPL CALC-SCNC: 17 MMOL/L (ref 8–16)
ANISOCYTOSIS BLD QL SMEAR: SLIGHT
BACTERIA BLD CULT: NORMAL
BACTERIA SPEC ANAEROBE CULT: NORMAL
BASOPHILS NFR BLD: 0 % (ref 0–1.9)
BUN SERPL-MCNC: 81 MG/DL (ref 6–20)
CALCIUM SERPL-MCNC: 9.3 MG/DL (ref 8.7–10.5)
CHLORIDE SERPL-SCNC: 92 MMOL/L (ref 95–110)
CO2 SERPL-SCNC: 22 MMOL/L (ref 23–29)
CREAT SERPL-MCNC: 8.5 MG/DL (ref 0.5–1.4)
DIFFERENTIAL METHOD: ABNORMAL
EOSINOPHIL NFR BLD: 1 % (ref 0–8)
ERYTHROCYTE [DISTWIDTH] IN BLOOD BY AUTOMATED COUNT: 15.1 % (ref 11.5–14.5)
EST. GFR  (AFRICAN AMERICAN): 5.5 ML/MIN/1.73 M^2
EST. GFR  (NON AFRICAN AMERICAN): 4.7 ML/MIN/1.73 M^2
GLUCOSE SERPL-MCNC: 105 MG/DL (ref 70–110)
GLUCOSE SERPL-MCNC: 126 MG/DL (ref 70–110)
GLUCOSE SERPL-MCNC: 137 MG/DL (ref 70–110)
GLUCOSE SERPL-MCNC: 323 MG/DL (ref 70–110)
HCT VFR BLD AUTO: 28.7 % (ref 37–48.5)
HGB BLD-MCNC: 9 G/DL (ref 12–16)
IMM GRANULOCYTES # BLD AUTO: ABNORMAL K/UL (ref 0–0.04)
IMM GRANULOCYTES NFR BLD AUTO: ABNORMAL % (ref 0–0.5)
LYMPHOCYTES NFR BLD: 17 % (ref 18–48)
MCH RBC QN AUTO: 27.4 PG (ref 27–31)
MCHC RBC AUTO-ENTMCNC: 31.4 G/DL (ref 32–36)
MCV RBC AUTO: 88 FL (ref 82–98)
METAMYELOCYTES NFR BLD MANUAL: 1 %
MONOCYTES NFR BLD: 7 % (ref 4–15)
MYELOCYTES NFR BLD MANUAL: 2 %
NEUTROPHILS NFR BLD: 62 % (ref 38–73)
NEUTS BAND NFR BLD MANUAL: 10 %
NRBC BLD-RTO: 0 /100 WBC
PLATELET # BLD AUTO: 416 K/UL (ref 150–450)
PLATELET BLD QL SMEAR: ABNORMAL
PMV BLD AUTO: 10.2 FL (ref 9.2–12.9)
POTASSIUM SERPL-SCNC: 3.9 MMOL/L (ref 3.5–5.1)
RBC # BLD AUTO: 3.28 M/UL (ref 4–5.4)
SODIUM SERPL-SCNC: 131 MMOL/L (ref 136–145)
WBC # BLD AUTO: 19.83 K/UL (ref 3.9–12.7)

## 2022-05-11 PROCEDURE — 99232 SBSQ HOSP IP/OBS MODERATE 35: CPT | Mod: ,,, | Performed by: INTERNAL MEDICINE

## 2022-05-11 PROCEDURE — 99232 PR SUBSEQUENT HOSPITAL CARE,LEVL II: ICD-10-PCS | Mod: ,,, | Performed by: PODIATRIST

## 2022-05-11 PROCEDURE — 63600175 PHARM REV CODE 636 W HCPCS: Performed by: NURSE PRACTITIONER

## 2022-05-11 PROCEDURE — 30200315 PPD INTRADERMAL TEST REV CODE 302: Performed by: INTERNAL MEDICINE

## 2022-05-11 PROCEDURE — 25000003 PHARM REV CODE 250: Performed by: INTERNAL MEDICINE

## 2022-05-11 PROCEDURE — 63600175 PHARM REV CODE 636 W HCPCS: Mod: JG | Performed by: INTERNAL MEDICINE

## 2022-05-11 PROCEDURE — 36415 COLL VENOUS BLD VENIPUNCTURE: CPT | Performed by: NURSE PRACTITIONER

## 2022-05-11 PROCEDURE — 85027 COMPLETE CBC AUTOMATED: CPT | Performed by: NURSE PRACTITIONER

## 2022-05-11 PROCEDURE — 27000221 HC OXYGEN, UP TO 24 HOURS

## 2022-05-11 PROCEDURE — 80048 BASIC METABOLIC PNL TOTAL CA: CPT | Performed by: NURSE PRACTITIONER

## 2022-05-11 PROCEDURE — 90935 HEMODIALYSIS ONE EVALUATION: CPT

## 2022-05-11 PROCEDURE — 25000003 PHARM REV CODE 250: Performed by: NURSE PRACTITIONER

## 2022-05-11 PROCEDURE — 12000002 HC ACUTE/MED SURGE SEMI-PRIVATE ROOM

## 2022-05-11 PROCEDURE — 99900035 HC TECH TIME PER 15 MIN (STAT)

## 2022-05-11 PROCEDURE — 85007 BL SMEAR W/DIFF WBC COUNT: CPT | Performed by: NURSE PRACTITIONER

## 2022-05-11 PROCEDURE — 99232 PR SUBSEQUENT HOSPITAL CARE,LEVL II: ICD-10-PCS | Mod: ,,, | Performed by: INTERNAL MEDICINE

## 2022-05-11 PROCEDURE — 99232 SBSQ HOSP IP/OBS MODERATE 35: CPT | Mod: ,,, | Performed by: PODIATRIST

## 2022-05-11 PROCEDURE — 94761 N-INVAS EAR/PLS OXIMETRY MLT: CPT

## 2022-05-11 PROCEDURE — 97110 THERAPEUTIC EXERCISES: CPT | Mod: CQ

## 2022-05-11 PROCEDURE — 86580 TB INTRADERMAL TEST: CPT | Performed by: INTERNAL MEDICINE

## 2022-05-11 RX ORDER — CALCIUM ACETATE 667 MG/1
667 CAPSULE ORAL
Status: DISCONTINUED | OUTPATIENT
Start: 2022-05-11 | End: 2022-05-19 | Stop reason: HOSPADM

## 2022-05-11 RX ADMIN — ASPIRIN 81 MG: 81 TABLET, COATED ORAL at 08:05

## 2022-05-11 RX ADMIN — HYDROCODONE BITARTRATE AND ACETAMINOPHEN 1 TABLET: 5; 325 TABLET ORAL at 10:05

## 2022-05-11 RX ADMIN — METOPROLOL TARTRATE 25 MG: 25 TABLET, FILM COATED ORAL at 08:05

## 2022-05-11 RX ADMIN — LACTULOSE 30 G: 20 SOLUTION ORAL at 02:05

## 2022-05-11 RX ADMIN — INSULIN ASPART 4 UNITS: 100 INJECTION, SOLUTION INTRAVENOUS; SUBCUTANEOUS at 08:05

## 2022-05-11 RX ADMIN — BENZONATATE 100 MG: 100 CAPSULE ORAL at 08:05

## 2022-05-11 RX ADMIN — FLUTICASONE PROPIONATE 100 MCG: 50 SPRAY, METERED NASAL at 08:05

## 2022-05-11 RX ADMIN — AZELASTINE HYDROCHLORIDE 137 MCG: 137 SPRAY, METERED NASAL at 09:05

## 2022-05-11 RX ADMIN — CALCIUM ACETATE 667 MG: 667 CAPSULE ORAL at 05:05

## 2022-05-11 RX ADMIN — THERA TABS 1 TABLET: TAB at 08:05

## 2022-05-11 RX ADMIN — INSULIN DETEMIR 20 UNITS: 100 INJECTION, SOLUTION SUBCUTANEOUS at 08:05

## 2022-05-11 RX ADMIN — CETIRIZINE HYDROCHLORIDE 10 MG: 10 TABLET, FILM COATED ORAL at 08:05

## 2022-05-11 RX ADMIN — LACTULOSE 30 G: 20 SOLUTION ORAL at 08:05

## 2022-05-11 RX ADMIN — TUBERCULIN PURIFIED PROTEIN DERIVATIVE 5 UNITS: 5 INJECTION, SOLUTION INTRADERMAL at 02:05

## 2022-05-11 RX ADMIN — AZELASTINE HYDROCHLORIDE 137 MCG: 137 SPRAY, METERED NASAL at 08:05

## 2022-05-11 RX ADMIN — ACETAMINOPHEN 650 MG: 325 TABLET, FILM COATED ORAL at 08:05

## 2022-05-11 RX ADMIN — PIPERACILLIN AND TAZOBACTAM 3.38 G: 3; .375 INJECTION, POWDER, LYOPHILIZED, FOR SOLUTION INTRAVENOUS; PARENTERAL at 12:05

## 2022-05-11 RX ADMIN — CALCIUM ACETATE 1334 MG: 667 CAPSULE ORAL at 08:05

## 2022-05-11 RX ADMIN — EPOETIN ALFA-EPBX 9000 UNITS: 10000 INJECTION, SOLUTION INTRAVENOUS; SUBCUTANEOUS at 10:05

## 2022-05-11 RX ADMIN — OXYCODONE HYDROCHLORIDE AND ACETAMINOPHEN 500 MG: 500 TABLET ORAL at 08:05

## 2022-05-11 NOTE — PROGRESS NOTES
[facility]  Podiatry  Progress Note    Patient Name: Leanna García  MRN: [id number]  Admission Date: 5/4/2022  Hospital Length of Stay: 7 days  Attending Physician: Usman Stacy MD  Primary Care Provider: Stefano Lopez MD     Subjective:     Interval History:  Patient resting in bed.  Nurse bedside.  Wound VAC in place.  Denies any new complaints in either extremity.        Scheduled Meds:   ascorbic acid (vitamin C)  500 mg Oral Daily    aspirin  81 mg Oral Daily    azelastine  1 spray Nasal BID    calcium acetate(phosphat bind)  667 mg Oral TID WM    cetirizine  10 mg Oral Every other day    collagenase   Topical (Top) Daily    DAPTOmycin (CUBICIN)  IV  500 mg Intravenous Q48H    epoetin chris-epbx  100 Units/kg Intravenous Every Mon, Wed, Fri    fluticasone propionate  2 spray Each Nostril Daily    insulin detemir U-100  20 Units Subcutaneous QHS    lactulose  30 g Oral TID    metoprolol tartrate  25 mg Oral BID    multivitamin  1 tablet Oral Daily    piperacillin-tazobactam (ZOSYN) IVPB  3.375 g Intravenous Q12H     Continuous Infusions:  PRN Meds:sodium chloride, sodium chloride 0.9%, sodium chloride 0.9%, acetaminophen, benzonatate, dextrose 50%, dextrose 50%, guaiFENesin-codeine 100-10 mg/5 ml, heparin (porcine), HYDROcodone-acetaminophen, insulin aspart U-100, melatonin, naloxone, ondansetron, polyethylene glycol, sodium chloride 0.9%, sodium chloride 0.9%, sodium chloride 0.9%    Review of Systems  Objective:     Vital Signs (Most Recent):  Temp: 98.6 °F (37 °C) (05/11/22 0915)  Pulse: (!) 54 (05/11/22 0930)  Resp: 18 (05/11/22 0915)  BP: (!) 102/47 (05/11/22 0930)  SpO2: 96 % (05/11/22 0915)   Vital Signs (24h Range):  Temp:  [98 °F (36.7 °C)-100.4 °F (38 °C)] 98.6 °F (37 °C)  Pulse:  [50-63] 54  Resp:  [18-20] 18  SpO2:  [94 %-98 %] 96 %  BP: (102-187)/(47-73) 102/47     Weight: 83 kg (182 lb 15.7 oz)  Body mass index is 28.66 kg/m².    Foot Exam  Wound VAC in place did not  disturb  Laboratory:  All pertinent labs reviewed within the last 24 hours.    Diagnostic Results:  I have reviewed all pertinent imaging results/findings within the past 24 hours.    Assessment/Plan:     Diabetic ulcer of left midfoot  Wound VAC changes twice weekly to both wounds on left foot.    Wilman ordered for nutrition for wound healing    Nonweightbearing to left foot     Counseled patient on increasing protein intake, not getting wound wet, keeping dressing clean dry and intact, following a healthy diet, removing pressure from wound      Recommend LTAC following discharge for IV antibiotics and wound care with wound VAC        Alivia Bruno DPM  Podiatry  Replaced by [facility]

## 2022-05-11 NOTE — CARE UPDATE
05/11/22 0816   PRE-TX-O2   O2 Device (Oxygen Therapy) nasal cannula   $ Is the patient on Low Flow Oxygen? Yes   Flow (L/min) 2   SpO2 97 %   Pulse Oximetry Type Intermittent   $ Pulse Oximetry - Multiple Charge Pulse Oximetry - Multiple   Pulse (!) 56   Resp 15   Respiratory Evaluation   $ Care Plan Tech Time 15 min

## 2022-05-11 NOTE — PT/OT/SLP PROGRESS
Occupational Therapy      Patient Name:  Leanna García   MRN:  [id number]    Patient not seen today secondary to  (In room dialysis). Will follow-up tomorrow.    5/11/2022

## 2022-05-11 NOTE — PROGRESS NOTES
[facility] Medicine  Progress Note    Patient Name: Leanna García  MRN: [id number]  Patient Class: IP- Inpatient   Admission Date: 5/4/2022  Length of Stay: 6 days  Attending Physician: Usman Stacy MD  Primary Care Provider: Stefano Lopez MD        Subjective:     Principal Problem:Bacteremia        HPI:  Ms. García is a 56-year-old female with a past medical history of ESRD on HD Monday Wednesday Friday, hypertension, IDDM2, PAF, and chronic cough who presents today with complaints of fever up to 103. It is severe.  It is associated with cough, posttussive vomiting, fatigue, sinus congestion, weakness, and a left foot ulcer.  She denies chest pain, diarrhea, dizziness, loss of consciousness.  In the ED she was noted to have a large left foot ulceration at the plantar surface and on the side of the foot.  CT of abdomen pelvis also reveals possible enteritis and bilateral perinephric fat stranding.  WBCs 21 K, troponin is 0.4 in the setting of ESRD.  She did not go to dialysis today she felt too bad.  Glucose is 446, anion gap 20, bicarb 24.       Overview/Hospital Course:  05/05  Had HD today  MRI r/o osteomyelitis  Afebrile now    05/06  Had febrile episodes overnight  [place] [facility] [place] [facility] [place] today  Awaiting WBC scan     05/07  WBC scan showed medial left foot infection  C/o extreme fatigue/weakness    05/08  Pt having on and off febrile episodes at night  Pt today had bedside debridement of L foot abscess     05/09  Pt had HD today  Febrile episodes eprsists  Receive done unit of blood     05/10  Overall much better except for constipation  Awaiting insurance approval for LTAC placement   Otherwise stable       Interval History:     Review of Systems   Constitutional:  Negative for activity change and appetite change.   HENT:  Negative for congestion and dental problem.    Eyes:  Negative for discharge and itching.   Respiratory:  Negative for shortness of  breath.    Cardiovascular:  Negative for chest pain.   Gastrointestinal:  Positive for constipation. Negative for abdominal distention and abdominal pain.   Endocrine: Negative for cold intolerance.   Genitourinary:  Negative for difficulty urinating and dysuria.   Musculoskeletal:  Negative for arthralgias and back pain.   Skin:  Positive for wound. Negative for color change.   Neurological:  Negative for dizziness and facial asymmetry.   Hematological:  Negative for adenopathy.   Psychiatric/Behavioral:  Negative for agitation and behavioral problems.    Objective:     Vital Signs (Most Recent):  Temp: 99 °F (37.2 °C) (05/10/22 1900)  Pulse: 60 (05/10/22 1900)  Resp: 20 (05/10/22 1900)  BP: (!) 140/56 (05/10/22 1900)  SpO2: 98 % (05/10/22 1900)   Vital Signs (24h Range):  Temp:  [98 °F (36.7 °C)-100.4 °F (38 °C)] 99 °F (37.2 °C)  Pulse:  [54-76] 60  Resp:  [18-22] 20  SpO2:  [94 %-99 %] 98 %  BP: (118-187)/(49-73) 140/56     Weight: 82 kg (180 lb 12.4 oz)  Body mass index is 28.31 kg/m².    Intake/Output Summary (Last 24 hours) at 5/10/2022 1931  Last data filed at 5/10/2022 1610  Gross per 24 hour   Intake 1620 ml   Output 3501 ml   Net -1881 ml      Physical Exam  Vitals and nursing note reviewed.   Constitutional:       General: She is not in acute distress.  HENT:      Head: Atraumatic.      Right Ear: External ear normal.      Left Ear: External ear normal.      Nose: Nose normal.      Mouth/Throat:      Mouth: Mucous membranes are moist.   Eyes:      General: No scleral icterus.  Cardiovascular:      Rate and Rhythm: Normal rate.   Pulmonary:      Effort: Pulmonary effort is normal.   Abdominal:      General: There is no distension.   Musculoskeletal:         General: Normal range of motion.      Cervical back: Normal range of motion.   Skin:     General: Skin is warm.      Comments: Bandages intact on R foot    Neurological:      Mental Status: She is alert and oriented to person, place, and time.    Psychiatric:         Behavior: Behavior normal.       Significant Labs: All pertinent labs within the past 24 hours have been reviewed.  CBC:   Recent Labs   Lab 05/09/22  0540 05/10/22  0443   WBC 19.57* 18.47*   HGB 6.7* 8.5*   HCT 22.1* 26.8*    378     CMP:   Recent Labs   Lab 05/09/22  0540 05/10/22  0443   * 133*   K 4.7 3.8   CL 96 95   CO2 21* 22*   * 159*   BUN 89* 52*   CREATININE 11.3* 6.5*   CALCIUM 9.6 9.1   ANIONGAP 16 16   EGFRNONAA 3.4* 6.6*       Significant Imaging: I have reviewed all pertinent imaging results/findings within the past 24 hours.      Assessment/Plan:      * Bacteremia  Gram positive rods on BC X 1 :Bacillus cereus   Had enteritis/Food poisoning which has been resolved(ate chinese food before onset of symptoms)  Repeat Blood cultures so far normal      Sepsis  Bacillus cereus bacteremia along with L foot abscess       Abscess of left foot  S/p Incision/drainage/debridement of  Left foot deep tissue abscess below fascia muscle and tendon      Diabetic ulcer of left midfoot  Wound care  MRI r/o osteomyelitis  WBC scan showed medial left foot as source of infection  S/p Incision/drainage/debridement of  Left foot deep tissue abscess below fascia muscle and tendon    Chronic anemia  pRBC if needed along with Dialysis sessions  Received one unit of blood on 05/09      Diabetic foot infection  As above         Foot infection  As above       Cough  Symptomatic management      Acute bronchitis  Symptomatic management      Elevated troponin  Likely demand ischemia in the setting of ESRD        Hypokalemia  Stable     Enteritis  Resolved     Type 2 diabetes mellitus with kidney complication, with long-term current use of insulin  Maintain present regime       ESRD (end stage renal disease)  HD sessions as scheduled       VTE Risk Mitigation (From admission, onward)         Ordered     heparin (porcine) injection 5,000 Units  As needed (PRN)         05/10/22 1541     IP  VTE HIGH RISK PATIENT  Once         05/05/22 0003     Place sequential compression device  Until discontinued         05/05/22 0003                Discharge Planning   ERI: 5/12/2022     Code Status: Full Code   Is the patient medically ready for discharge?: Yes    Reason for patient still in hospital (select all that apply): Pending disposition  Discharge Plan A: Long-term acute care facility (LTAC)   Discharge Delays: None known at this time              Usman Stacy MD  Department of Hospital Medicine   [facility]

## 2022-05-11 NOTE — SUBJECTIVE & OBJECTIVE
Subjective:     Interval History:  Patient resting in bed.  Nurse bedside.  Wound VAC in place.  Denies any new complaints in either extremity.        Scheduled Meds:   ascorbic acid (vitamin C)  500 mg Oral Daily    aspirin  81 mg Oral Daily    azelastine  1 spray Nasal BID    calcium acetate(phosphat bind)  667 mg Oral TID WM    cetirizine  10 mg Oral Every other day    collagenase   Topical (Top) Daily    DAPTOmycin (CUBICIN)  IV  500 mg Intravenous Q48H    epoetin chris-epbx  100 Units/kg Intravenous Every Mon, Wed, Fri    fluticasone propionate  2 spray Each Nostril Daily    insulin detemir U-100  20 Units Subcutaneous QHS    lactulose  30 g Oral TID    metoprolol tartrate  25 mg Oral BID    multivitamin  1 tablet Oral Daily    piperacillin-tazobactam (ZOSYN) IVPB  3.375 g Intravenous Q12H     Continuous Infusions:  PRN Meds:sodium chloride, sodium chloride 0.9%, sodium chloride 0.9%, acetaminophen, benzonatate, dextrose 50%, dextrose 50%, guaiFENesin-codeine 100-10 mg/5 ml, heparin (porcine), HYDROcodone-acetaminophen, insulin aspart U-100, melatonin, naloxone, ondansetron, polyethylene glycol, sodium chloride 0.9%, sodium chloride 0.9%, sodium chloride 0.9%    Review of Systems  Objective:     Vital Signs (Most Recent):  Temp: 98.6 °F (37 °C) (05/11/22 0915)  Pulse: (!) 54 (05/11/22 0930)  Resp: 18 (05/11/22 0915)  BP: (!) 102/47 (05/11/22 0930)  SpO2: 96 % (05/11/22 0915)   Vital Signs (24h Range):  Temp:  [98 °F (36.7 °C)-100.4 °F (38 °C)] 98.6 °F (37 °C)  Pulse:  [50-63] 54  Resp:  [18-20] 18  SpO2:  [94 %-98 %] 96 %  BP: (102-187)/(47-73) 102/47     Weight: 83 kg (182 lb 15.7 oz)  Body mass index is 28.66 kg/m².    Foot Exam  Wound VAC in place did not disturb  Laboratory:  All pertinent labs reviewed within the last 24 hours.    Diagnostic Results:  I have reviewed all pertinent imaging results/findings within the past 24 hours.

## 2022-05-11 NOTE — PLAN OF CARE
Received notification from Idalia with [facility] that [facility] denied

## 2022-05-11 NOTE — ASSESSMENT & PLAN NOTE
Gram positive rods on BC X 1 :Bacillus cereus   Had enteritis/Food poisoning which has been resolved(ate chinese food before onset of symptoms)  Repeat Blood cultures so far normal  Continue Daptomycin 500 mg IV q48h   Continue Zosyn 4.5 g IV q.12 until 5/13, then stop, per ID MD

## 2022-05-11 NOTE — CARE UPDATE
05/10/22 2227   PRE-TX-O2   O2 Device (Oxygen Therapy) nasal cannula   $ Is the patient on Low Flow Oxygen? Yes   Flow (L/min) 2   SpO2 98 %   Pulse Oximetry Type Intermittent   $ Pulse Oximetry - Multiple Charge Pulse Oximetry - Multiple   Pulse 60   Resp 19   Education   $ Education 15 min   Respiratory Evaluation   $ Care Plan Tech Time 15 min

## 2022-05-11 NOTE — PLAN OF CARE
LTAC services denied by [employer] after [id number].  [facility] liaison Idalia has accepted patient for TCU/SNF services.         05/11/22 1419   Discharge Reassessment   Assessment Type Discharge Planning Reassessment   Did the patient's condition or plan change since previous assessment? Yes   Discharge Plan A Skilled Nursing Facility   Discharge Plan B Skilled Nursing Facility   DME Needed Upon Discharge  none   Discharge Barriers Identified None   Why the patient remains in the hospital Requires continued medical care   Post-Acute Status   Post-Acute Authorization Placement   Post-Acute Placement Status Pending Bed Availability   Hospital Resources/Appts/Education Provided Post-Acute resouces added to AVS   Patient choice form signed by patient/caregiver List with quality metrics by geographic area provided   Discharge Delays None known at this time

## 2022-05-11 NOTE — PLAN OF CARE
Received notification from Idalia with [facility] that [facility] has denied LTAC services for this patient and has offered [id number] review.  [id number] arranged per Joni specialist Hayley with [facility] for today at 1230.  Dr. Stacy notified.        05/11/22 0863   Post-Acute Status   Post-Acute Authorization Placement   Post-Acute Placement Status Pending payor medical review/second level review

## 2022-05-11 NOTE — PROGRESS NOTES
[facility] Medicine  Progress Note    Patient Name: Leanna García  MRN: [id number]  Patient Class: IP- Inpatient   Admission Date: 5/4/2022  Length of Stay: 7 days  Attending Physician: Usman Stacy MD  Primary Care Provider: Stefano Lopez MD        Subjective:     Principal Problem:Bacteremia        HPI:  Ms. García is a 56-year-old female with a past medical history of ESRD on HD Monday Wednesday Friday, hypertension, IDDM2, PAF, and chronic cough who presents today with complaints of fever up to 103. It is severe.  It is associated with cough, posttussive vomiting, fatigue, sinus congestion, weakness, and a left foot ulcer.  She denies chest pain, diarrhea, dizziness, loss of consciousness.  In the ED she was noted to have a large left foot ulceration at the plantar surface and on the side of the foot.  CT of abdomen pelvis also reveals possible enteritis and bilateral perinephric fat stranding.  WBCs 21 K, troponin is 0.4 in the setting of ESRD.  She did not go to dialysis today she felt too bad.  Glucose is 446, anion gap 20, bicarb 24.       Overview/Hospital Course:  05/05  Had HD today  MRI r/o osteomyelitis  Afebrile now    05/06  Had febrile episodes overnight  [place] cultures [place] [facility] [place] [facility] [place]  Had dialysis today  Awaiting WBC scan     05/07  WBC scan showed medial left foot infection  C/o extreme fatigue/weakness    05/08  Pt having on and off febrile episodes at night  Pt today had bedside debridement of L foot abscess     05/09  Pt had HD today  Febrile episodes eprsists  Receive done unit of blood     05/10  Overall much better except for constipation  Awaiting insurance approval for LTAC placement   Otherwise stable     05/11  LTAC stay denied by insurance  Insurance approved SNF placement  Medically stable       Interval History:     Review of Systems   Constitutional:  Negative for activity change and appetite change.   HENT:  Negative for congestion and dental problem.     Eyes:  Negative for discharge and itching.   Respiratory:  Negative for shortness of breath.    Cardiovascular:  Negative for chest pain.   Gastrointestinal:  Negative for abdominal distention and abdominal pain.   Endocrine: Negative for cold intolerance.   Genitourinary:  Negative for difficulty urinating and dysuria.   Musculoskeletal:  Negative for arthralgias and back pain.   Skin:  Positive for wound. Negative for color change.   Neurological:  Negative for dizziness and facial asymmetry.   Hematological:  Negative for adenopathy.   Psychiatric/Behavioral:  Negative for agitation and behavioral problems.    Objective:     Vital Signs (Most Recent):  Temp: 98.4 °F (36.9 °C) (05/11/22 1224)  Pulse: 61 (05/11/22 1224)  Resp: 18 (05/11/22 1224)  BP: 136/72 (05/11/22 1224)  SpO2: 96 % (05/11/22 1224) Vital Signs (24h Range):  Temp:  [98.1 °F (36.7 °C)-100.4 °F (38 °C)] 98.4 °F (36.9 °C)  Pulse:  [50-63] 61  Resp:  [15-20] 18  SpO2:  [94 %-98 %] 96 %  BP: (102-187)/(47-73) 136/72     Weight: 83 kg (182 lb 15.7 oz)  Body mass index is 28.66 kg/m².    Intake/Output Summary (Last 24 hours) at 5/11/2022 1304  Last data filed at 5/11/2022 1224  Gross per 24 hour   Intake 1100 ml   Output 3001 ml   Net -1901 ml      Physical Exam  Vitals and nursing note reviewed.   Constitutional:       General: She is not in acute distress.  HENT:      Head: Atraumatic.      Right Ear: External ear normal.      Left Ear: External ear normal.      Nose: Nose normal.      Mouth/Throat:      Mouth: Mucous membranes are moist.   Eyes:      General: No scleral icterus.  Cardiovascular:      Rate and Rhythm: Normal rate.   Pulmonary:      Effort: Pulmonary effort is normal.   Abdominal:      General: There is no distension.   Musculoskeletal:         General: Normal range of motion.      Cervical back: Normal range of motion.   Skin:     General: Skin is warm.      Comments: RLE bandage intact   Neurological:      Mental Status: She is  alert and oriented to person, place, and time.   Psychiatric:         Behavior: Behavior normal.       Significant Labs: All pertinent labs within the past 24 hours have been reviewed.  CBC:   Recent Labs   Lab 05/10/22  0443 05/11/22  0512   WBC 18.47* 19.83*   HGB 8.5* 9.0*   HCT 26.8* 28.7*    416     CMP:   Recent Labs   Lab 05/10/22  0443 05/11/22  0512   * 131*   K 3.8 3.9   CL 95 92*   CO2 22* 22*   * 137*   BUN 52* 81*   CREATININE 6.5* 8.5*   CALCIUM 9.1 9.3   ANIONGAP 16 17*   EGFRNONAA 6.6* 4.7*       Significant Imaging: I have reviewed all pertinent imaging results/findings within the past 24 hours.      Assessment/Plan:      * Bacteremia  Gram positive rods on BC X 1 :Bacillus cereus   Had enteritis/Food poisoning which has been resolved(ate chinese food before onset of symptoms)  Repeat Blood cultures so far normal  Continue Daptomycin 500 mg IV q48h   Continue Zosyn 4.5 g IV q.12 until 5/13, then stop, per ID MD    Sepsis  Bacillus cereus bacteremia along with L foot abscess       Abscess of left foot  S/p Incision/drainage/debridement of  Left foot deep tissue abscess below fascia muscle and tendon      Diabetic ulcer of left midfoot  Wound care  MRI r/o osteomyelitis  WBC scan showed medial left foot as source of infection  S/p Incision/drainage/debridement of  Left foot deep tissue abscess below fascia muscle and tendon    Discharge planning issues  LTAC stay denied by insurance  Awaiting SNF placement  Medically stable       Chronic anemia  pRBC if needed along with Dialysis sessions  Received one unit of blood on 05/09      Diabetic foot infection  As above         Foot infection  As above       Cough  Symptomatic management      Acute bronchitis  Symptomatic management      Elevated troponin  Likely demand ischemia in the setting of ESRD        Hypokalemia  Stable     Enteritis  Resolved     Type 2 diabetes mellitus with kidney complication, with long-term current use of  insulin  Maintain present regime       ESRD (end stage renal disease)  HD sessions as scheduled         VTE Risk Mitigation (From admission, onward)         Ordered     heparin (porcine) injection 5,000 Units  As needed (PRN)         05/10/22 1541     IP VTE HIGH RISK PATIENT  Once         05/05/22 0003     Place sequential compression device  Until discontinued         05/05/22 0003                Discharge Planning   ERI: 5/12/2022     Code Status: Full Code   Is the patient medically ready for discharge?: Yes    Reason for patient still in hospital (select all that apply): Pending disposition  Discharge Plan A: Long-term acute care facility (LTAC)   Discharge Delays: None known at this time              Usman Stacy MD  Department of Hospital Medicine   [facility]

## 2022-05-11 NOTE — PROGRESS NOTES
05/11/22 1224   Handoff Report   Received From ABI Gomez   Given To ABI Mcgregor   Vital Signs   Temp 98.4 °F (36.9 °C)   Pulse 61   Resp 18   SpO2 96 %   /72   Post-Hemodialysis Assessment   Rinseback Volume (mL) 250 mL   Blood Volume Processed (Liters) 67.6 L   Dialyzer Clearance Lightly streaked   Duration of Treatment 180 minutes   Additional Fluid Intake (mL) 500 mL   Total UF (mL) 3000 mL   Net Fluid Removal 2500   Patient Response to Treatment tolerated well   Arterial bleeding stop time (min) 5 min   Venous bleeding stop time (min) 5 min   Post-Hemodialysis Comments tx completed without incident

## 2022-05-11 NOTE — SUBJECTIVE & OBJECTIVE
Interval History:     Review of Systems   Constitutional:  Negative for activity change and appetite change.   HENT:  Negative for congestion and dental problem.    Eyes:  Negative for discharge and itching.   Respiratory:  Negative for shortness of breath.    Cardiovascular:  Negative for chest pain.   Gastrointestinal:  Negative for abdominal distention and abdominal pain.   Endocrine: Negative for cold intolerance.   Genitourinary:  Negative for difficulty urinating and dysuria.   Musculoskeletal:  Negative for arthralgias and back pain.   Skin:  Positive for wound. Negative for color change.   Neurological:  Negative for dizziness and facial asymmetry.   Hematological:  Negative for adenopathy.   Psychiatric/Behavioral:  Negative for agitation and behavioral problems.    Objective:     Vital Signs (Most Recent):  Temp: 98.4 °F (36.9 °C) (05/11/22 1224)  Pulse: 61 (05/11/22 1224)  Resp: 18 (05/11/22 1224)  BP: 136/72 (05/11/22 1224)  SpO2: 96 % (05/11/22 1224) Vital Signs (24h Range):  Temp:  [98.1 °F (36.7 °C)-100.4 °F (38 °C)] 98.4 °F (36.9 °C)  Pulse:  [50-63] 61  Resp:  [15-20] 18  SpO2:  [94 %-98 %] 96 %  BP: (102-187)/(47-73) 136/72     Weight: 83 kg (182 lb 15.7 oz)  Body mass index is 28.66 kg/m².    Intake/Output Summary (Last 24 hours) at 5/11/2022 1304  Last data filed at 5/11/2022 1224  Gross per 24 hour   Intake 1100 ml   Output 3001 ml   Net -1901 ml      Physical Exam  Vitals and nursing note reviewed.   Constitutional:       General: She is not in acute distress.  HENT:      Head: Atraumatic.      Right Ear: External ear normal.      Left Ear: External ear normal.      Nose: Nose normal.      Mouth/Throat:      Mouth: Mucous membranes are moist.   Eyes:      General: No scleral icterus.  Cardiovascular:      Rate and Rhythm: Normal rate.   Pulmonary:      Effort: Pulmonary effort is normal.   Abdominal:      General: There is no distension.   Musculoskeletal:         General: Normal range of  motion.      Cervical back: Normal range of motion.   Skin:     General: Skin is warm.      Comments: RLE bandage intact   Neurological:      Mental Status: She is alert and oriented to person, place, and time.   Psychiatric:         Behavior: Behavior normal.       Significant Labs: All pertinent labs within the past 24 hours have been reviewed.  CBC:   Recent Labs   Lab 05/10/22  0443 05/11/22  0512   WBC 18.47* 19.83*   HGB 8.5* 9.0*   HCT 26.8* 28.7*    416     CMP:   Recent Labs   Lab 05/10/22  0443 05/11/22  0512   * 131*   K 3.8 3.9   CL 95 92*   CO2 22* 22*   * 137*   BUN 52* 81*   CREATININE 6.5* 8.5*   CALCIUM 9.1 9.3   ANIONGAP 16 17*   EGFRNONAA 6.6* 4.7*       Significant Imaging: I have reviewed all pertinent imaging results/findings within the past 24 hours.

## 2022-05-11 NOTE — PROGRESS NOTES
INPATIENT NEPHROLOGY Progress Note  [facility] [place] NEPHROLOGY    Leanna García  05/11/2022    Reason for consultation:    ESRD    Chief Complaint:   Chief Complaint   Patient presents with    Fever          History of Present Illness:    Per H and P    Ms. García is a 56-year-old female with a past medical history of ESRD on HD Monday Wednesday Friday, hypertension, IDDM2, PAF, and chronic cough who presents today with complaints of fever up to 103. It is severe.  It is associated with cough, posttussive vomiting, fatigue, sinus congestion, weakness, and a left foot ulcer.  She denies chest pain, diarrhea, dizziness, loss of consciousness.  In the ED she was noted to have a large left foot ulceration at the plantar surface and on the side of the foot.  CT of abdomen pelvis also reveals possible enteritis and bilateral perinephric fat stranding.  WBCs 21 K, troponin is 0.4 in the setting of ESRD.  She did not go to dialysis today she felt too bad.  Glucose is 446, anion gap 20, bicarb 24.     5/5  C/o foot pain.  Mild dyspnea.  No vomiting, chest pain, urinary or bowel complaints.  Weak  5/6  Currently getting wound vac placed.  Has foot pain. No respiratory distress  5/7 still spiking fevers.  Foot pain.  Stopped hd early yesterday  5/8  Tired today.  No vomiting or sob.  Tmax 101.9 at 1900 yesterday  5/9 wound vac today  5/10 no issues with HD yest- got 1u of blood- net UF 2.6L- waiting for wound vac- gave ok with PICC  5/11 febrile, BP stable, on 2L NC- seen on HD- no complaints; has wound vac; looking to place at [facility]    Plan of Care:     ESRD on HD MWF  --continue dialysis per routine    Hypertension  --fluid restrict 1.5L/day  --low salt diet 2g/day  --uf with hd    Hyponatremia  --fluid restrict  --140 Na dialysate    SHPT  --phos is below goa- change binder to 1 tab with meals     Anemia of CKD  --Hb is better after transfusion on 5/9  --continue FERMÍN with HD    Diab Foot Ulcer- MRSA  --wound vac, picc  for IV antbx at LT[facility]  --dose antbx for CrCl< 10/HD    Thank you for allowing us to participate in this patient's care. We will continue to follow.    Vital Signs:  Temp Readings from Last 3 Encounters:   05/11/22 98.4 °F (36.9 °C)   04/26/22 98.2 °F (36.8 °C)   03/10/22 97.3 °F (36.3 °C) (Tympanic)       Pulse Readings from Last 3 Encounters:   05/11/22 (!) 53   04/26/22 66   04/05/22 75       BP Readings from Last 3 Encounters:   05/11/22 (!) 140/71   04/28/22 (!) 180/82   04/26/22 (!) 174/66       Weight:  Wt Readings from Last 3 Encounters:   05/11/22 83 kg (182 lb 15.7 oz)   05/06/22 86.6 kg (191 lb)   04/28/22 94 kg (207 lb 5.5 oz)       Medications:  No current facility-administered medications on file prior to encounter.     Current Outpatient Medications on File Prior to Encounter   Medication Sig Dispense Refill    ascorbic acid, vitamin C, (VITAMIN C) 500 MG tablet Take 500 mg by mouth once daily.      aspirin (ECOTRIN) 81 MG EC tablet Take 81 mg by mouth once daily.      atorvastatin (LIPITOR) 80 MG tablet Take 1 tablet (80 mg total) by mouth once daily. (Patient taking differently: Take 80 mg by mouth every evening.) 90 tablet 3    blood sugar diagnostic Strp To check BG 4 times daily, to use with insurance preferred meter (Patient taking differently: 1 strip by Misc.(Non-Drug; Combo Route) route 4 (four) times daily. To check BG 4 times daily, to use with insurance preferred meter) 450 strip 3    insulin aspart U-100 (NOVOLOG FLEXPEN U-100 INSULIN) 100 unit/mL (3 mL) InPn pen Inject 5-8 units 3 times per day with food. 1 Box 6    insulin detemir U-100 (LEVEMIR FLEXTOUCH U-100 INSULN) 100 unit/mL (3 mL) InPn pen Inject 15-18 Units into the skin once daily. 1 Box 6    lancets Misc To use to check blood sugar 4 times daily. To use with insurance approved meter. Patient needs the round, purple one (Patient taking differently: 1 lancet by Misc.(Non-Drug; Combo Route) route 4 (four) times daily. To use  "to check blood sugar 4 times daily. To use with insurance approved meter. Patient needs the round, purple one) 450 each 3    metoprolol tartrate (LOPRESSOR) 25 MG tablet Take 25 mg by mouth 2 (two) times daily.      minoxidiL (LONITEN) 2.5 MG tablet Take 5 mg by mouth 2 (two) times daily.      multivitamin (THERAGRAN) per tablet Take 1 tablet by mouth once daily.      pen needle, diabetic (BD ULTRA-FINE ROBERT PEN NEEDLE) 32 gauge x 5/32" Ndle To use 4 times per day with insulin injections. (Patient taking differently: 1 pen by Misc.(Non-Drug; Combo Route) route 4 (four) times daily. To use 4 times per day with insulin injections.) 450 each 2    sucroferric oxyhydroxide (VELPHORO) 500 mg Chew Take 500 mg by mouth 3 (three) times daily.      dextrose (GLUCOSE GEL) 40 % gel Take 37.5 mLs (15,000 mg total) by mouth once as needed (hypoglycemia). 37.5 g 4    gabapentin (NEURONTIN) 300 MG capsule Take 300 mg by mouth every evening.      glucagon (BAQSIMI) 3 mg/actuation Spry 3 mg (one actuation) into a single nostril; if no response, may repeat in 15 minutes using a new intranasal device. (Patient taking differently: 3 mg by Left Nostril route as needed. 3 mg (one actuation) into a single nostril; if no response, may repeat in 15 minutes using a new intranasal device.) 2 each 1    [DISCONTINUED] blood-glucose meter (ACCU-CHEK KAYLIE PLUS METER) Misc To use to check blood sugars 4 times a day 1 each 0    [DISCONTINUED] clorazepate (TRANXENE) 3.75 MG Tab Take 7.5 mg by mouth nightly as needed.       [DISCONTINUED] fenofibrate 160 MG Tab Take 1 tablet (160 mg total) by mouth once daily. 90 tablet 3    [DISCONTINUED] lancing device with lancets (ACCU-CHEK SOFT DEV LANCETS) Kit To check blood sugars 4 times per day 400 each 3     Scheduled Meds:   sodium chloride 0.9%   Intravenous Once    sodium chloride 0.9%   Intravenous Once    ascorbic acid (vitamin C)  500 mg Oral Daily    aspirin  81 mg Oral Daily    " "azelastine  1 spray Nasal BID    calcium acetate(phosphat bind)  1,334 mg Oral TID WM    cetirizine  10 mg Oral Every other day    collagenase   Topical (Top) Daily    DAPTOmycin (CUBICIN)  IV  500 mg Intravenous Q48H    epoetin chris-epbx  100 Units/kg Intravenous Every Mon, Wed, Fri    fluticasone propionate  2 spray Each Nostril Daily    insulin detemir U-100  20 Units Subcutaneous QHS    lactulose  30 g Oral TID    metoprolol tartrate  25 mg Oral BID    multivitamin  1 tablet Oral Daily    piperacillin-tazobactam (ZOSYN) IVPB  3.375 g Intravenous Q12H     Continuous Infusions:  PRN Meds:.sodium chloride, sodium chloride 0.9%, sodium chloride 0.9%, acetaminophen, benzonatate, dextrose 50%, dextrose 50%, guaiFENesin-codeine 100-10 mg/5 ml, heparin (porcine), HYDROcodone-acetaminophen, insulin aspart U-100, melatonin, naloxone, ondansetron, polyethylene glycol, sodium chloride 0.9%, sodium chloride 0.9%, sodium chloride 0.9%    Review of Systems:  Neg    Physical Exam:    BP (!) 140/71   Pulse (!) 53   Temp 98.4 °F (36.9 °C)   Resp 18   Ht 5' 7" (1.702 m)   Wt 83 kg (182 lb 15.7 oz)   SpO2 96%   BMI 28.66 kg/m²     Constitutional: nad, aao x 3  Heart: rrr, no m/r/g, wwp, no edema  Lungs: ctab, no w/r/r/c, no lb  Abdomen: s/nt/nd, +BS    Results:  Lab Results   Component Value Date     (L) 05/11/2022    K 3.9 05/11/2022    CL 92 (L) 05/11/2022    CO2 22 (L) 05/11/2022    BUN 81 (H) 05/11/2022    CREATININE 8.5 (H) 05/11/2022    CALCIUM 9.3 05/11/2022    ANIONGAP 17 (H) 05/11/2022    ESTGFRAFRICA 5.5 (A) 05/11/2022    EGFRNONAA 4.7 (A) 05/11/2022       Lab Results   Component Value Date    CALCIUM 9.3 05/11/2022    PHOS 3.3 05/10/2022       Recent Labs   Lab 05/11/22 0512   WBC 19.83*   RBC 3.28*   HGB 9.0*   HCT 28.7*      MCV 88   MCH 27.4   MCHC 31.4*        Imaging Results          X-Ray Foot Complete Left (Final result)  Result time 05/05/22 06:12:52    Final result by Denise WHITE" MD Sean (05/05/22 06:12:52)                 Narrative:    Three views of the left foot are compared to prior study dated 12/3/2021    Clinical history is infection    There is osteopenia. There has been prior amputation of the fourth digit at the base of the proximal phalanx.    There are moderate degenerative changes of the midfoot and hindfoot with joint space narrowing and spurring.. There are no fractures, dislocations or osteolysis. There is a subcutaneous ulceration the mid plantar soft tissues. There is moderate peripheral vascular calcification.    IMPRESSION: Prior amputation of fourth digit without radiographic evidence for osteomyelitis.    Moderate degenerative changes of the foot    16mm soft tissue ulceration in the mid plantar medial soft tissues    Electronically signed by:  Denise Estrada MD  5/5/2022 6:12 AM CDT Workstation: [id number]                             CT Abdomen Pelvis  Without Contrast (Final result)  Result time 05/04/22 21:53:02    Final result by Ernesto Lino MD (05/04/22 21:53:02)                 Narrative:    EXAM: CT ABDOMEN PELVIS WITHOUT CONTRAST    RadLex: CT ABDOMEN PELVIS WITHOUT IV CONTRAST    HISTORY: 56 years  Female;  Abdominal pain, acute, nonlocalized;    TECHNIQUE:   Standard CT of the abdomen and pelvis was performed without the use of intravenous contrast media. Lack of intravenous contrast limits evaluation of soft tissue structures in this study.    CT scans at this facility use dose modulation, iterative reconstruction and/or weight based dosing when appropriate to reduce radiation dose to as low as reasonably achievable.    COMPARISON: None available    FINDING(S):    ABDOMEN:    Lung bases show dependent atelectasis. Coronary artery calcifications are seen. Cardiac size normal. Mild thickening of the distal esophagus with small hiatal hernia.    Small amount of fluid is seen in the stomach. Mild thickening of the stomach lining.    Mild hepatic  steatosis. Spleen, pancreas, and both adrenals are normal.    There is gallbladder distention with multiple gallstones.    Neither kidney shows hydronephrosis. There is moderate bilateral perinephric stranding which appears above expected and may represent inflammation. Correlate with urinalysis to exclude the possibility of pyelonephritis.    There are vascular calcifications involving both kidneys. There may be separate small stones as well. The ureters themselves are decompressed.    Marked atherosclerosis affects the aorta and the major branch vessels. No abdominal lymphadenopathy or free abdominal fluid.    The small intestines contain a small to moderate amount of fluid with scattered air-fluid levels. No transition point to suggest obstruction.    No evidence of appendicitis. Oral contrast is seen in the intestines. There is a mild to moderate stool burden. Scattered colonic diverticulosis.    PELVIS:    Bladder is decompressed. No free fluid within the pelvis. There is an enlarged left external iliac lymph node measuring 1.6 x 0.8 cm on series 3, image 210.    No other groin or pelvis lymphadenopathy. A small left inguinal hernia contains only fat.    Small umbilical hernia contains only fat.    There are degenerative changes in the lower lumbar spine.    IMPRESSION:    1.  Small to moderate amount of fluid in the small intestines with scattered air fluid levels, possibly enteritis  2.  Moderate bilateral perinephric stranding. Correlate with urinalysis to exclude the possibility of pyelonephritis  3.  Gallbladder distention with gallstones    Electronically signed by:  Ernesto Lino MD  5/4/2022 9:53 PM CDT Workstation: [id number]                             X-Ray Chest 1 View (Final result)  Result time 05/05/22 06:10:56    Final result by Denise Estrada MD (05/05/22 06:10:56)                 Narrative:    XR CHEST 1 VIEW    CLINICAL HISTORY:  56 years Female pain    COMPARISON: 5/19/2021    FINDINGS:  Cardiomediastinal silhouette is within normal limits. Lungs are normally expanded with no airspace consolidation. No pleural effusion or pneumothorax. No acute osseous abnormality.    IMPRESSION: No acute pulmonary process.    Electronically signed by:  Denise Estrada MD  5/5/2022 6:10 AM CDT Workstation: [id number]                                I have personally reviewed pertinent radiological imaging and reports.     Patient care was time spent personally by me on the following activities: > 35 min  · Obtaining a history  · Examination of patient.  · Providing medical care at the patients bedside.  · Developing a treatment plan with patient or surrogate and bedside caregivers  · Ordering and reviewing laboratory studies, radiographic studies, pulse oximetry.  · Ordering and performing treatments and interventions.  · Evaluation of patient's response to treatment.  · Discussions with consultants while on the unit and immediately available to the patient.  · Re-evaluation of the patient's condition.  · Documentation in the medical record.     Ashleigh Soria MD MPH  [facility] [address] [address]  [phone] (p)  [phone] (f)

## 2022-05-11 NOTE — TELEPHONE ENCOUNTER
Spoke with pt to schedule appt's. Pt states that she currently in [facility] and may be moved to a facility; Unable to schedule at this time; will send 30 day letter

## 2022-05-11 NOTE — PROGRESS NOTES
NMConsult Note  Infectious Disease    Reason for Consult:  Bacillus cereus bacteremia, and MRSA osteomyelitis     HPI: Leanna García is a 56 y.o. female known to me from prior consultation in September of 2018, was admitted through the emergency room yesterday with fever 103,   Vomiting of 1 day's duration, and chronic sinus congestion, postnasal drip, cough at least several weeks duration and a foot ulcer on the plantar/medial surface surface of the left foot of probably several months duration.  The vomiting began within a few hours of a meal at a [employer] restaurant.  She did not have any diarrhea In the emergency room she had a CT scan of the abdomen which suggested possible enteritis and bilateral perinephric fat stranding, as well as cholelithiasis, and extensive vascular calcifications.  her blood sugar was 446, white blood cells 21,000, normal lactic acid.  Photographs taken in the emergency room suggest cellulitis of the medial and plantar foot She was started on Zosyn and doxycycline as she has history of vancomycin allergy. .  She was seen by Podiatry , and MRI did not demonstrate any abscess or drainage and wound care was ordered.  She was noted this morning to have difficulty swallowing and was seen by speech therapy.  Today 1 anaerobic bottle has a Gram-positive mariana prompting this consult.    5/6: interim reviewed. Still febrile through last night. Blood culture does have characteristics of Bacillus cereus. This will be sent out to [facility] for MALDI ID. Respiratory pcr panel was negative. She was able to eat solid food this am. She denies abdominal pain. Podiatry debrided her foot this am and submitted cultures and is ordering a WBC Scan. Coughing less. No wheezes.     5/7 (Daren):  Interim reviewed, discussed with Dr Bailon.  Patient seen and examined at bedside, states she does not feel good today, feels like she got something that is making her feel off, not her usual self.  Labile BP  121/58, low-grade fever T-max 102° yesterday, currently 100.8.  Wound cultures from left foot grew Staph aureus, pending sensitivities.  She is currently on Daptomycin, Zosyn and levofloxacin IV.    5/8: Interim reviewed, patient seen and examined at bedside. Feeling significantly better compared to yesterday.  Having lunch at bedside.  Hemodynamically stable, T-max 103° yesterday, currently afebrile.  Labs reviewed, white count 17, PMN 79.2%, no bands.  H&H 7/23.3, platelet count 332. Sodium 135, potassium 4.4.  Status post further debridement at bedside today by Podiatry, large deep tissue abscess seen on the plantar facial below the flexor tendon.  Mild necrosis noted and all nonviable tissue was debrided, cultures in process.  Micro updated, confirmed Bacillus cereus bacteremia, wound culture from left foot grew MRSA.    5/9 (Adamaris):  Interim reviewed.  Blood culture isolate confirmed to be bacillus serious, likely from the restaurant that she ate before the onset of her illness.  Wound cultures from 05/16//8 have MRSA.  As she is allergic to vancomycin she is on daptomycin.  Echocardiogram negative temperature is improved since the drainage of her foot though she did have a 101 temperature last night.  White blood cells remain elevated, hemoglobin 6.7, she is receiving blood, CRP is down about 10%. Not getting out of bed. Sugary soft drinks on her bedside table.   5/10: interim reviewed. Fever has finally resolved. Received blood with dialysis yesterday. Foot cultures have grown MSSA and MRSA. Currently struggling with constipation, miserable, on bedpan.   5/11: low grade temp again today. Per RN, wound care did note some purulence yesterday in foot wound at the time of wound vac application yesterday. Diarrhea? after laxatives. WBC still very high, midline in place now.       EXAM & DIAGNOSTICS REVIEWED:     Vitals:     Temp:  [98.1 °F (36.7 °C)-100.4 °F (38 °C)]   Temp: 98.4 °F (36.9 °C) (05/11/22  1224)  Pulse: 61 (05/11/22 1224)  Resp: 18 (05/11/22 1224)  BP: 136/72 (05/11/22 1224)  SpO2: 96 % (05/11/22 1224)    Intake/Output Summary (Last 24 hours) at 5/11/2022 1417  Last data filed at 5/11/2022 1224  Gross per 24 hour   Intake 1100 ml   Output 3001 ml   Net -1901 ml        General:  In NAD. Alert and attentive, cooperative, comfortable on room air  Eyes:  Anicteric,  EOMI  ENT:  No ulcers, exudates, thrush, nares patent, edentulous  Neck:  Supple,   Lungs: Clear  Heart:  RRR, 2/6 systolic murmur +  Abd:  Soft, NT, mildly distended, normal BS, no masses or organomegaly appreciated.  :  Voids  no flank tenderness  Musc:  Joints without effusion, swelling, erythema, synovitis but she does have lower extremity muscle wasting.  She has Charcot arthropathy with fallen arches bilaterally and an ulceration on the medial arch   Skin:  No rashes   Neuro:Alert, attentive, speech fluent, face symmetric, moves all extremities, no focal weakness.  Minimally Ambulatory at baseline  Psych:  Calm, cooperative  Lymphatic:        Extrem: Left foot with wound vac in place     No edema, erythema, phlebitis, warm and well perfused  VAD:  Left arm AV fistula  No redness     Isolation: contact - MRSA  Wound:           5/6:   This was debrided full thickness this am    5/8:        5/9: bandage changed, packing not disturbed  5/11: wound vac in place      General Labs reviewed:  Recent Labs   Lab 05/09/22  0540 05/10/22  0443 05/11/22  0512   WBC 19.57* 18.47* 19.83*   HGB 6.7* 8.5* 9.0*   HCT 22.1* 26.8* 28.7*    378 416       Recent Labs   Lab 05/04/22  2040 05/05/22  0320 05/09/22  0540 05/10/22  0443 05/11/22  0512   *   < > 133* 133* 131*   K 3.2*   < > 4.7 3.8 3.9   CL 84*   < > 96 95 92*   CO2 24   < > 21* 22* 22*   BUN 51*   < > 89* 52* 81*   CREATININE 9.9*   < > 11.3* 6.5* 8.5*   CALCIUM 8.8   < > 9.6 9.1 9.3   PROT 8.0  --   --   --   --    BILITOT 0.9  --   --   --   --    ALKPHOS 60  --   --   --   --     ALT 11  --   --   --   --    AST 13  --   --   --   --     < > = values in this interval not displayed.     Recent Labs   Lab 05/04/22  2040 05/08/22  0723 05/10/22  0443   CRP 32.78* 23.11* 23.04*         Micro:  Microbiology Results (last 7 days)     Procedure Component Value Units Date/Time    Blood culture [[id number]] Collected: 05/06/22 1100    Order Status: Completed Specimen: Blood Updated: 05/11/22 1232     Blood Culture, Routine No growth after 5 days.    Narrative:      Collection has been rescheduled by RE1 at 05/06/2022 08:51 Reason:   Having a scan  Collection has been rescheduled by SLR at 05/06/2022 10:27 Reason:   Unable to collect  Collection has been rescheduled by RE1 at 05/06/2022 10:35 Reason:   Unable to collect  Collection has been rescheduled by RE1 at 05/06/2022 08:51 Reason:   Having a scan  Collection has been rescheduled by SLR at 05/06/2022 10:27 Reason:   Unable to collect  Collection has been rescheduled by RE1 at 05/06/2022 10:35 Reason:   Unable to collect    Blood culture [[id number]] Collected: 05/08/22 0723    Order Status: Completed Specimen: Blood Updated: 05/11/22 1032     Blood Culture, Routine No Growth to date      No Growth to date      No Growth to date      No Growth to date    Narrative:      Collection has been rescheduled by SLR at 05/08/2022 06:15 Reason:   Unable to collect  Collection has been rescheduled by RE1 at 05/08/2022 06:55 Reason:   Unable to collect   Collection has been rescheduled by SLR at 05/08/2022 06:15 Reason:   Unable to collect  Collection has been rescheduled by RE1 at 05/08/2022 06:55 Reason:   Unable to collect     Tissue culture [[id number]]  (Abnormal)  (Susceptibility) Collected: 05/08/22 1003    Order Status: Completed Specimen: Tissue Updated: 05/10/22 0650     Aerobic Culture - Tissue STAPHYLOCOCCUS AUREUS  Rare       Gram Stain Result Moderate WBC's      No organisms seen    Blood culture #2 **CANNOT BE ORDERED STAT** [[id number]]  Collected: 05/04/22 2155    Order Status: Completed Specimen: Blood from Peripheral, Antecubital, Right Updated: 05/09/22 2232     Blood Culture, Routine No growth after 5 days.    Blood culture [[id number]]  (Abnormal) Collected: 05/04/22 2040    Order Status: Completed Specimen: Blood Updated: 05/09/22 1604     Blood Culture, Routine Gram stain lukasz bottle: Gram positive rods      Results called to and read back by:Landy Nation RN-3000;  05/05/2022        10:52 CJD      BACILLUS CEREUS    Culture, Anaerobic [[id number]] Collected: 05/08/22 1003    Order Status: Sent Specimen: Abscess from Foot, Left Updated: 05/08/22 1012    Culture, Anaerobic [[id number]] Collected: 05/06/22 0753    Order Status: Completed Specimen: Wound from Foot, Left Updated: 05/08/22 1011     Anaerobic Culture No anaerobes isolated    Aerobic culture [[id number]] Collected: 05/08/22 1003    Order Status: Canceled Specimen: Tissue from Foot, Left     Aerobic culture [[id number]]  (Abnormal)  (Susceptibility) Collected: 05/06/22 0753    Order Status: Completed Specimen: Wound from Foot, Left Updated: 05/08/22 0832     Aerobic Bacterial Culture METHICILLIN RESISTANT STAPHYLOCOCCUS AUREUS  Many  Results called to and read back by Kathy Maki RN-BCARD;    05/08/2022  08:32 CJD      Culture, ID (Consult) [[id number]] Collected: 05/04/22 2040    Order Status: Completed Specimen: Blood Updated: 05/07/22 1422     Culture, Identification (consult) Bacillus cereus    Respiratory Infection Panel (PCR), Nasopharyngeal [[id number]] Collected: 05/05/22 1826    Order Status: Completed Specimen: Nasopharyngeal Swab Updated: 05/05/22 2126     Respiratory Infection Panel Source NP swab     Adenovirus Not Detected     Coronavirus 229E, Common Cold Virus Not Detected     Coronavirus HKU1, Common Cold Virus Not Detected     Coronavirus NL63, Common Cold Virus Not Detected     Coronavirus OC43, Common Cold Virus Not Detected     Comment: The Coronavirus  strains detected in this test cause the common cold.  These strains are not the COVID-19 (novel Coronavirus)strain   associated with the respiratory disease outbreak.          SARS-CoV2 (COVID-19) Qualitative PCR Not Detected     Human Metapneumovirus Not Detected     Human Rhinovirus/Enterovirus Not Detected     Influenza A (subtypes H1, H1-2009,H3) Not Detected     Influenza B Not Detected     Parainfluenza Virus 1 Not Detected     Parainfluenza Virus 2 Not Detected     Parainfluenza Virus 3 Not Detected     Parainfluenza Virus 4 Not Detected     Respiratory Syncytial Virus Not Detected     Bordetella Parapertussis (WK9281) Not Detected     Bordetella pertussis (ptxP) Not Detected     Chlamydia pneumoniae Not Detected     Mycoplasma pneumoniae Not Detected     Comment: Respiratory Infection Panel testing performed by Multiplex PCR.       Narrative:      Respiratory Infection Panel source->NP Swab    Blood culture #1 **CANNOT BE ORDERED STAT** [[id number]] Collected: 05/04/22 2040    Order Status: Canceled Specimen: Blood from Peripheral, Antecubital, Right           Imaging Reviewed:   CXR   CT abdomen and pelvis 5/4   1.  Small to moderate amount of fluid in the small intestines with scattered air fluid levels, possibly enteritis 2.  Moderate bilateral perinephric stranding. Correlate with urinalysis to exclude the possibility of pyelonephritis 3.  Gallbladder distention with gallstones     MRI of the left midfoot 5/5  IMPRESSION: 16mm skin ulceration in the medial plantar soft tissues with associated cellulitis. There is no abscess or osteomyelitis   Moderate degenerative changes of the midfoot    NM scan Abnormal indium-111 WBC uptake along medial left foot suggesting source of infection.     Cardiology:   ECHO 5/6/22  The left ventricle is normal in size with normal systolic function.  The estimated ejection fraction is 65%.  Normal left ventricular diastolic function.  Normal right ventricular size with normal  right ventricular systolic function.  Mild mitral regurgitation.  All valves visualized, normal.    IMPRESSION & PLAN     1. Bacillus cereus bacteremia likely in the setting of recent Chinese food ingestion   Blood cultures 5/6 & 5/8 no growth, pending final   Echocardiogram negative   [facility] department notified    2. Left foot ulcer, deep tissue abscess/osteomyelitis status post debridement by Podiatry 5/6 and 5/8, deep pocket of pus drained 5/9   Wound culture 5/6 MRSA and MSSA    3.  ESRD on HD,  Nephrology following    4.  Diabetes with hyperglycemia, A1c 14%  5. constipation     Recommendations:    Continue Daptomycin 500 mg IV q48h for MRSA, MSSA and Bacillus cereus  Weekly cpk, hold statins while on daptomycin  Continue Zosyn 4.5 g IV q.12 until 5/13, then stop     Patient will benefit from LTAC to continue wound care to left foot with wound VAC and IV antibiotics.    enema    Counseled to avoid sugary soft drinks. Needs aggressive glycemic control  D/w  Patient      Will follow      Medical Decision Making during this encounter was  [_] Low Complexity  [_] Moderate Complexity  [x  ] High Complexity

## 2022-05-11 NOTE — SUBJECTIVE & OBJECTIVE
Interval History:     Review of Systems   Constitutional:  Negative for activity change and appetite change.   HENT:  Negative for congestion and dental problem.    Eyes:  Negative for discharge and itching.   Respiratory:  Negative for shortness of breath.    Cardiovascular:  Negative for chest pain.   Gastrointestinal:  Positive for constipation. Negative for abdominal distention and abdominal pain.   Endocrine: Negative for cold intolerance.   Genitourinary:  Negative for difficulty urinating and dysuria.   Musculoskeletal:  Negative for arthralgias and back pain.   Skin:  Positive for wound. Negative for color change.   Neurological:  Negative for dizziness and facial asymmetry.   Hematological:  Negative for adenopathy.   Psychiatric/Behavioral:  Negative for agitation and behavioral problems.    Objective:     Vital Signs (Most Recent):  Temp: 99 °F (37.2 °C) (05/10/22 1900)  Pulse: 60 (05/10/22 1900)  Resp: 20 (05/10/22 1900)  BP: (!) 140/56 (05/10/22 1900)  SpO2: 98 % (05/10/22 1900)   Vital Signs (24h Range):  Temp:  [98 °F (36.7 °C)-100.4 °F (38 °C)] 99 °F (37.2 °C)  Pulse:  [54-76] 60  Resp:  [18-22] 20  SpO2:  [94 %-99 %] 98 %  BP: (118-187)/(49-73) 140/56     Weight: 82 kg (180 lb 12.4 oz)  Body mass index is 28.31 kg/m².    Intake/Output Summary (Last 24 hours) at 5/10/2022 1931  Last data filed at 5/10/2022 1610  Gross per 24 hour   Intake 1620 ml   Output 3501 ml   Net -1881 ml      Physical Exam  Vitals and nursing note reviewed.   Constitutional:       General: She is not in acute distress.  HENT:      Head: Atraumatic.      Right Ear: External ear normal.      Left Ear: External ear normal.      Nose: Nose normal.      Mouth/Throat:      Mouth: Mucous membranes are moist.   Eyes:      General: No scleral icterus.  Cardiovascular:      Rate and Rhythm: Normal rate.   Pulmonary:      Effort: Pulmonary effort is normal.   Abdominal:      General: There is no distension.   Musculoskeletal:          General: Normal range of motion.      Cervical back: Normal range of motion.   Skin:     General: Skin is warm.      Comments: Bandages intact on R foot    Neurological:      Mental Status: She is alert and oriented to person, place, and time.   Psychiatric:         Behavior: Behavior normal.       Significant Labs: All pertinent labs within the past 24 hours have been reviewed.  CBC:   Recent Labs   Lab 05/09/22  0540 05/10/22  0443   WBC 19.57* 18.47*   HGB 6.7* 8.5*   HCT 22.1* 26.8*    378     CMP:   Recent Labs   Lab 05/09/22  0540 05/10/22  0443   * 133*   K 4.7 3.8   CL 96 95   CO2 21* 22*   * 159*   BUN 89* 52*   CREATININE 11.3* 6.5*   CALCIUM 9.6 9.1   ANIONGAP 16 16   EGFRNONAA 3.4* 6.6*       Significant Imaging: I have reviewed all pertinent imaging results/findings within the past 24 hours.

## 2022-05-11 NOTE — PT/OT/SLP PROGRESS
Physical Therapy Treatment    Patient Name:  Leanna García   MRN:  [id number]    Recommendations:     Discharge Recommendations:  LTACH (long-term acute care hospital), nursing facility, skilled   Discharge Equipment Recommendations: wheelchair   Barriers to discharge: increased assist with mobiltiy    Assessment:     Leanna García is a 56 y.o. female admitted with a medical diagnosis of Bacteremia.  She presents with the following impairments/functional limitations:  weakness, impaired endurance, impaired self care skills, impaired functional mobilty, gait instability, impaired balance, impaired cardiopulmonary response to activity.  Pt found sitting EOB. Pt agreeable to visit. Pt fatigued from dialysis. Pt wanting to lie back down. Pt not wanting to attempt ambulation or do anything sitting EOB but agreeable to supine LE therex. Pt tolerated session well.    Rehab Prognosis: Fair; patient would benefit from acute skilled PT services to address these deficits and reach maximum level of function.    Recent Surgery: * No surgery found *      Plan:     During this hospitalization, patient to be seen daily to address the identified rehab impairments via gait training, therapeutic activities, therapeutic exercises and progress toward the following goals:    · Plan of Care Expires:  06/07/22    Subjective     Chief Complaint: fatigue from dialysis and general malaise  Patient/Family Comments/goals: wanting to rest  Pain/Comfort:  · Pain Rating 1: other (see comments) (not rated)  · Location - Side 1: Left  · Location 1: foot  · Pain Addressed 1: Distraction, Cessation of Activity, Reposition      Objective:     Communicated with RN prior to session.  Patient found sitting edge of bed with telemetry, peripheral IV, wound vac upon PT entry to room.     General Precautions: Standard, fall   Orthopedic Precautions:LLE non weight bearing   Braces: N/A  Respiratory Status: Nasal cannula, flow 2 L/min     Functional  Mobility:  · Bed Mobility:     · Sit to Supine: minimum assistance      AM-PAC 6 CLICK MOBILITY          Therapeutic Activities and Exercises:  Pt instructed in and performed LE therex x 10 each: SLR, hip abd/add, HS, QS, AP.     Pt educated on importance of time OOB, importance of intermittent mobility, safe techniques for transfers/ambulation, discharge recommendations/options, and use of call light for assistance and fall prevention.      Patient left HOB elevated with all lines intact, call button in reach, bed alarm on and RN notified..    GOALS:   Multidisciplinary Problems     Physical Therapy Goals        Problem: Physical Therapy    Goal Priority Disciplines Outcome Goal Variances Interventions   Physical Therapy Goal     PT, PT/OT Ongoing, Progressing     Description: Goals to be met by: 22     Patient will increase functional independence with mobility by performin. Supine to sit with Modified [place]  2. Sit to supine with Modified [place]  3. Sit to stand transfer with Contact Guard Assistance  4. Bed to chair transfer with Contact Guard Assistance using Rolling Walker maintaining NWB L LE  5. Wheelchair propulsion x150 feet with Supervision using bilateral uppper extremities                     Time Tracking:     PT Received On: 22  PT Start Time: 1345     PT Stop Time: 1359  PT Total Time (min): 14 min     Billable Minutes: Therapeutic Exercise 14    Treatment Type: Treatment  PT/PTA: PTA     PTA Visit Number: 1     2022

## 2022-05-12 ENCOUNTER — ANESTHESIA EVENT (OUTPATIENT)
Dept: SURGERY | Facility: HOSPITAL | Age: 57
DRG: 853 | End: 2022-05-12
Payer: MEDICARE

## 2022-05-12 DIAGNOSIS — M79.672 PAIN IN LEFT FOOT: ICD-10-CM

## 2022-05-12 DIAGNOSIS — M25.472 PAIN AND SWELLING OF LEFT ANKLE: Primary | ICD-10-CM

## 2022-05-12 DIAGNOSIS — E11.69 DIABETIC FOOT ULCER WITH OSTEOMYELITIS: ICD-10-CM

## 2022-05-12 DIAGNOSIS — M86.9 DIABETIC FOOT ULCER WITH OSTEOMYELITIS: ICD-10-CM

## 2022-05-12 DIAGNOSIS — L97.509 DIABETIC FOOT ULCER WITH OSTEOMYELITIS: ICD-10-CM

## 2022-05-12 DIAGNOSIS — M25.572 PAIN AND SWELLING OF LEFT ANKLE: Primary | ICD-10-CM

## 2022-05-12 DIAGNOSIS — E11.621 DIABETIC FOOT ULCER WITH OSTEOMYELITIS: ICD-10-CM

## 2022-05-12 LAB
ANION GAP SERPL CALC-SCNC: 13 MMOL/L (ref 8–16)
ANISOCYTOSIS BLD QL SMEAR: SLIGHT
BASOPHILS NFR BLD: 1 % (ref 0–1.9)
BUN SERPL-MCNC: 72 MG/DL (ref 6–20)
CALCIUM SERPL-MCNC: 9.7 MG/DL (ref 8.7–10.5)
CHLORIDE SERPL-SCNC: 94 MMOL/L (ref 95–110)
CO2 SERPL-SCNC: 25 MMOL/L (ref 23–29)
CREAT SERPL-MCNC: 6.5 MG/DL (ref 0.5–1.4)
CRP SERPL-MCNC: 25.26 MG/DL
DIFFERENTIAL METHOD: ABNORMAL
EOSINOPHIL NFR BLD: 0 % (ref 0–8)
ERYTHROCYTE [DISTWIDTH] IN BLOOD BY AUTOMATED COUNT: 15.4 % (ref 11.5–14.5)
EST. GFR  (AFRICAN AMERICAN): 7.6 ML/MIN/1.73 M^2
EST. GFR  (NON AFRICAN AMERICAN): 6.6 ML/MIN/1.73 M^2
GLUCOSE SERPL-MCNC: 246 MG/DL (ref 70–110)
GLUCOSE SERPL-MCNC: 268 MG/DL (ref 70–110)
GLUCOSE SERPL-MCNC: 273 MG/DL (ref 70–110)
GLUCOSE SERPL-MCNC: 276 MG/DL (ref 70–110)
GLUCOSE SERPL-MCNC: 389 MG/DL (ref 70–110)
HCT VFR BLD AUTO: 34.5 % (ref 37–48.5)
HGB BLD-MCNC: 10.7 G/DL (ref 12–16)
IMM GRANULOCYTES # BLD AUTO: ABNORMAL K/UL (ref 0–0.04)
IMM GRANULOCYTES NFR BLD AUTO: ABNORMAL % (ref 0–0.5)
LYMPHOCYTES NFR BLD: 10 % (ref 18–48)
MCH RBC QN AUTO: 28.3 PG (ref 27–31)
MCHC RBC AUTO-ENTMCNC: 31 G/DL (ref 32–36)
MCV RBC AUTO: 91 FL (ref 82–98)
METAMYELOCYTES NFR BLD MANUAL: 3 %
MONOCYTES NFR BLD: 9 % (ref 4–15)
MYELOCYTES NFR BLD MANUAL: 3 %
NEUTROPHILS NFR BLD: 60 % (ref 38–73)
NEUTS BAND NFR BLD MANUAL: 14 %
NRBC BLD-RTO: 0 /100 WBC
PLATELET # BLD AUTO: 488 K/UL (ref 150–450)
PLATELET BLD QL SMEAR: ABNORMAL
PMV BLD AUTO: 10.3 FL (ref 9.2–12.9)
POTASSIUM SERPL-SCNC: 4.4 MMOL/L (ref 3.5–5.1)
RBC # BLD AUTO: 3.78 M/UL (ref 4–5.4)
SODIUM SERPL-SCNC: 132 MMOL/L (ref 136–145)
WBC # BLD AUTO: 23.45 K/UL (ref 3.9–12.7)

## 2022-05-12 PROCEDURE — 63600175 PHARM REV CODE 636 W HCPCS: Performed by: INTERNAL MEDICINE

## 2022-05-12 PROCEDURE — 86140 C-REACTIVE PROTEIN: CPT | Performed by: NURSE PRACTITIONER

## 2022-05-12 PROCEDURE — 25000003 PHARM REV CODE 250: Performed by: INTERNAL MEDICINE

## 2022-05-12 PROCEDURE — 99232 SBSQ HOSP IP/OBS MODERATE 35: CPT | Mod: ,,, | Performed by: PODIATRIST

## 2022-05-12 PROCEDURE — 99232 PR SUBSEQUENT HOSPITAL CARE,LEVL II: ICD-10-PCS | Mod: ,,, | Performed by: INTERNAL MEDICINE

## 2022-05-12 PROCEDURE — 63600175 PHARM REV CODE 636 W HCPCS: Performed by: NURSE PRACTITIONER

## 2022-05-12 PROCEDURE — 85027 COMPLETE CBC AUTOMATED: CPT | Performed by: NURSE PRACTITIONER

## 2022-05-12 PROCEDURE — 99232 SBSQ HOSP IP/OBS MODERATE 35: CPT | Mod: ,,, | Performed by: INTERNAL MEDICINE

## 2022-05-12 PROCEDURE — 25000003 PHARM REV CODE 250: Performed by: NURSE PRACTITIONER

## 2022-05-12 PROCEDURE — 36415 COLL VENOUS BLD VENIPUNCTURE: CPT | Performed by: NURSE PRACTITIONER

## 2022-05-12 PROCEDURE — 80048 BASIC METABOLIC PNL TOTAL CA: CPT | Performed by: NURSE PRACTITIONER

## 2022-05-12 PROCEDURE — 85007 BL SMEAR W/DIFF WBC COUNT: CPT | Performed by: NURSE PRACTITIONER

## 2022-05-12 PROCEDURE — 97530 THERAPEUTIC ACTIVITIES: CPT | Mod: CQ

## 2022-05-12 PROCEDURE — 97535 SELF CARE MNGMENT TRAINING: CPT

## 2022-05-12 PROCEDURE — 97530 THERAPEUTIC ACTIVITIES: CPT

## 2022-05-12 PROCEDURE — 97116 GAIT TRAINING THERAPY: CPT | Mod: CQ

## 2022-05-12 PROCEDURE — 99232 PR SUBSEQUENT HOSPITAL CARE,LEVL II: ICD-10-PCS | Mod: ,,, | Performed by: PODIATRIST

## 2022-05-12 PROCEDURE — 12000002 HC ACUTE/MED SURGE SEMI-PRIVATE ROOM

## 2022-05-12 RX ORDER — ALPRAZOLAM 0.5 MG/1
0.5 TABLET ORAL 3 TIMES DAILY PRN
Status: DISCONTINUED | OUTPATIENT
Start: 2022-05-12 | End: 2022-05-13

## 2022-05-12 RX ORDER — HYDROMORPHONE HYDROCHLORIDE 1 MG/ML
0.5 INJECTION, SOLUTION INTRAMUSCULAR; INTRAVENOUS; SUBCUTANEOUS ONCE
Status: COMPLETED | OUTPATIENT
Start: 2022-05-12 | End: 2022-05-12

## 2022-05-12 RX ORDER — ALPRAZOLAM 0.5 MG/1
0.5 TABLET ORAL ONCE
Status: COMPLETED | OUTPATIENT
Start: 2022-05-12 | End: 2022-05-12

## 2022-05-12 RX ADMIN — INSULIN ASPART 3 UNITS: 100 INJECTION, SOLUTION INTRAVENOUS; SUBCUTANEOUS at 06:05

## 2022-05-12 RX ADMIN — INSULIN ASPART 3 UNITS: 100 INJECTION, SOLUTION INTRAVENOUS; SUBCUTANEOUS at 09:05

## 2022-05-12 RX ADMIN — DAPTOMYCIN 500 MG: 500 INJECTION, POWDER, LYOPHILIZED, FOR SOLUTION INTRAVENOUS at 09:05

## 2022-05-12 RX ADMIN — CALCIUM ACETATE 667 MG: 667 CAPSULE ORAL at 12:05

## 2022-05-12 RX ADMIN — ASPIRIN 81 MG: 81 TABLET, COATED ORAL at 09:05

## 2022-05-12 RX ADMIN — FLUTICASONE PROPIONATE 100 MCG: 50 SPRAY, METERED NASAL at 09:05

## 2022-05-12 RX ADMIN — METOPROLOL TARTRATE 25 MG: 25 TABLET, FILM COATED ORAL at 09:05

## 2022-05-12 RX ADMIN — PIPERACILLIN AND TAZOBACTAM 3.38 G: 3; .375 INJECTION, POWDER, LYOPHILIZED, FOR SOLUTION INTRAVENOUS; PARENTERAL at 12:05

## 2022-05-12 RX ADMIN — OXYCODONE HYDROCHLORIDE AND ACETAMINOPHEN 500 MG: 500 TABLET ORAL at 09:05

## 2022-05-12 RX ADMIN — PIPERACILLIN AND TAZOBACTAM 3.38 G: 3; .375 INJECTION, POWDER, LYOPHILIZED, FOR SOLUTION INTRAVENOUS; PARENTERAL at 01:05

## 2022-05-12 RX ADMIN — CALCIUM ACETATE 667 MG: 667 CAPSULE ORAL at 06:05

## 2022-05-12 RX ADMIN — AZELASTINE HYDROCHLORIDE 137 MCG: 137 SPRAY, METERED NASAL at 09:05

## 2022-05-12 RX ADMIN — CALCIUM ACETATE 667 MG: 667 CAPSULE ORAL at 07:05

## 2022-05-12 RX ADMIN — INSULIN ASPART 5 UNITS: 100 INJECTION, SOLUTION INTRAVENOUS; SUBCUTANEOUS at 09:05

## 2022-05-12 RX ADMIN — HYDROMORPHONE HYDROCHLORIDE 0.5 MG: 1 INJECTION, SOLUTION INTRAMUSCULAR; INTRAVENOUS; SUBCUTANEOUS at 04:05

## 2022-05-12 RX ADMIN — INSULIN DETEMIR 20 UNITS: 100 INJECTION, SOLUTION SUBCUTANEOUS at 09:05

## 2022-05-12 RX ADMIN — ALPRAZOLAM 0.5 MG: 0.5 TABLET ORAL at 01:05

## 2022-05-12 RX ADMIN — THERA TABS 1 TABLET: TAB at 09:05

## 2022-05-12 NOTE — ASSESSMENT & PLAN NOTE
Gram positive rods on BC X 1 :Bacillus cereus   Had enteritis/Food poisoning which has been resolved(ate chinese food before onset of symptoms)  Repeat Blood cultures so far normal  S/p Incision/drainage/debridement of  Left foot deep tissue abscess below fascia muscle and tendon  Continue Daptomycin 500 mg IV q48h   Continue Zosyn 4.5 g IV q.12 until 5/13, then stop, per ID MD

## 2022-05-12 NOTE — CARE UPDATE
CM spoke with [facility] intake. Patient is an established patient of [facility] but may need Hep B panel to be accepted as a transient patient in [place]. CM awaiting return call to clarify.

## 2022-05-12 NOTE — PROGRESS NOTES
[facility]  Adult Nutrition   Progress Note (Follow-Up)    SUMMARY      Recommendations:   1. Recommend Renal, Diabetic(IDDSI Level 7a) Easy to chew diet as tolerated.   2. Continue Wilman and Glucerna as tolerated.  3.  to obtain meal choices daily     Goals:   Goals: 1. Patient to meet at least 75% of estimated needs through meal intake.    Dietitian Rounds Brief  Patient to have surgery tomorrow. Patient continues to have Wound Vac. RD changed diet to Renal, Diabetic, Easy to chew due to BG labs > 200, A1c > 10.     Diet order: Renal Diabetic IDDSI 7a Easy to chew    % Intake of Estimated Energy Needs: 50 - 75 %  % Meal Intake: 50 - 75 %    Estimated/Assessed Needs  Weight Used For Calorie Calculations: 87 kg (191 lb 12.8 oz)  Energy Calorie Requirements (kcal): 3535-2106 kcals/day (20-25 kcals/kg)  Energy Need Method: Kcal/kg  Protein Requirements:  g/day (1.5-2.0 g/kg IBW 2' ESRD-on HD)  Weight Used For Protein Calculations: 61.2 kg (135 lb)  Fluid Requirements (mL): UOP +  1000 mL or per MD     RDA Method (mL): 1740       Weight History:  Wt Readings from Last 5 Encounters:   05/11/22 83 kg (182 lb 15.7 oz)   05/06/22 86.6 kg (191 lb)   04/28/22 94 kg (207 lb 5.5 oz)   04/26/22 93 kg (205 lb)   04/05/22 94.3 kg (208 lb)        Reason for Assessment  Reason For Assessment: length of stay  Diagnosis:  (bacteremia)  Relevant Medical History: ESRD- on HD, T2DM, HTN, CHF, MANJINDER, iron deficiency anemia, TIA  Interdisciplinary Rounds: did not attend    Medications:Pertinent Medications Reviewed  Scheduled Meds:   ascorbic acid (vitamin C)  500 mg Oral Daily    aspirin  81 mg Oral Daily    azelastine  1 spray Nasal BID    calcium acetate(phosphat bind)  667 mg Oral TID WM    cetirizine  10 mg Oral Every other day    collagenase   Topical (Top) Daily    DAPTOmycin (CUBICIN)  IV  500 mg Intravenous Q48H    epoetin chris-epbx  100 Units/kg Intravenous Every Mon, Wed, Fri    fluticasone  propionate  2 spray Each Nostril Daily    insulin detemir U-100  20 Units Subcutaneous QHS    metoprolol tartrate  25 mg Oral BID    multivitamin  1 tablet Oral Daily    piperacillin-tazobactam (ZOSYN) IVPB  3.375 g Intravenous Q12H     Continuous Infusions:  PRN Meds:.sodium chloride, sodium chloride 0.9%, sodium chloride 0.9%, acetaminophen, benzonatate, dextrose 50%, dextrose 50%, guaiFENesin-codeine 100-10 mg/5 ml, heparin (porcine), HYDROcodone-acetaminophen, insulin aspart U-100, melatonin, naloxone, ondansetron, polyethylene glycol, sodium chloride 0.9%, sodium chloride 0.9%, sodium chloride 0.9%    Labs: Pertinent Labs Reviewed  Clinical Chemistry:  Recent Labs   Lab 05/10/22  0443 05/12/22  0648   * 132*   K 3.8 4.4   CL 95 94*   CO2 22* 25   * 273*   BUN 52* 72*   CREATININE 6.5* 6.5*   CALCIUM 9.1 9.7   ANIONGAP 16 13   ESTGFRAFRICA 7.6* 7.6*   EGFRNONAA 6.6* 6.6*   PHOS 3.3  --     < > = values in this interval not displayed.     CBC:   Recent Labs   Lab 05/12/22  0648   WBC 23.45*   RBC 3.78*   HGB 10.7*   HCT 34.5*   *   MCV 91   MCH 28.3   MCHC 31.0*     Cardiac Profile:  Recent Labs   Lab 05/10/22  0443   *     Inflammatory Labs:  Recent Labs   Lab 05/08/22  0723 05/10/22  0443   CRP 23.11* 23.04*     Monitor and Evaluation  Food and Nutrient Intake: energy intake, food and beverage intake  Food and Nutrient Adminstration: diet order  Physical Activity and Function: nutrition-related ADLs and IADLs, factors affecting access to physical activity  Anthropometric Measurements: weight, weight change, body mass index  Biochemical Data, Medical Tests and Procedures: electrolyte and renal panel, gastrointestinal profile, glucose/endocrine profile, inflammatory profile, lipid profile  Nutrition-Focused Physical Findings: overall appearance     Nutrition Risk  Level of Risk/Frequency of Follow-up: moderate     Nutrition Follow-Up  RD Follow-up?: Yes    Sonia Christiansen, [place] 05/12/2022  11:53 AM

## 2022-05-12 NOTE — PROGRESS NOTES
[facility] Medicine  Progress Note    Patient Name: Leanna García  MRN: [id number]  Patient Class: IP- Inpatient   Admission Date: 5/4/2022  Length of Stay: 8 days  Attending Physician: Usman Stacy MD  Primary Care Provider: Stefano Lopez MD        Subjective:     Principal Problem:Bacteremia        HPI:  Ms. García is a 56-year-old female with a past medical history of ESRD on HD Monday Wednesday Friday, hypertension, IDDM2, PAF, and chronic cough who presents today with complaints of fever up to 103. It is severe.  It is associated with cough, posttussive vomiting, fatigue, sinus congestion, weakness, and a left foot ulcer.  She denies chest pain, diarrhea, dizziness, loss of consciousness.  In the ED she was noted to have a large left foot ulceration at the plantar surface and on the side of the foot.  CT of abdomen pelvis also reveals possible enteritis and bilateral perinephric fat stranding.  WBCs 21 K, troponin is 0.4 in the setting of ESRD.  She did not go to dialysis today she felt too bad.  Glucose is 446, anion gap 20, bicarb 24.       Overview/Hospital Course:  05/05  Had HD today  MRI r/o osteomyelitis  Afebrile now    05/06  Had febrile episodes overnight  Blood cultures send to [facility] in [facility] [place], [place]  Had dialysis today  Awaiting WBC scan     05/07  WBC scan showed medial left foot infection  C/o extreme fatigue/weakness    05/08  Pt having on and off febrile episodes at night  Pt today had bedside debridement of L foot abscess     05/09  Pt had HD today  Febrile episodes eprsists  Receive done unit of blood     05/10  Overall much better except for constipation  Awaiting insurance approval for LTAC placement   Otherwise stable     05/11  LTAC stay denied by insurance  Insurance approved SNF placement  Medically stable     05/12  Pt still having febrile episodes  WBC levels high  Pt c/o pain on R foot area      Interval History:     Review of Systems   Constitutional:  Negative for  activity change and appetite change.   HENT:  Negative for congestion and dental problem.    Eyes:  Negative for discharge and itching.   Respiratory:  Negative for shortness of breath.    Cardiovascular:  Negative for chest pain.   Gastrointestinal:  Negative for abdominal distention and abdominal pain.   Endocrine: Negative for cold intolerance.   Genitourinary:  Negative for difficulty urinating and dysuria.   Musculoskeletal:  Positive for arthralgias. Negative for back pain.   Skin:  Positive for wound. Negative for color change.   Neurological:  Negative for dizziness and facial asymmetry.   Hematological:  Negative for adenopathy.   Psychiatric/Behavioral:  Negative for agitation and behavioral problems.    Objective:     Vital Signs (Most Recent):  Temp: 98.7 °F (37.1 °C) (05/12/22 0722)  Pulse: (!) 56 (05/12/22 0722)  Resp: 17 (05/12/22 0722)  BP: (!) 159/62 (05/12/22 0946)  SpO2: (!) 92 % (05/12/22 0722)   Vital Signs (24h Range):  Temp:  [97.9 °F (36.6 °C)-102.8 °F (39.3 °C)] 98.7 °F (37.1 °C)  Pulse:  [52-74] 56  Resp:  [17-18] 17  SpO2:  [92 %-96 %] 92 %  BP: (124-159)/(47-62) 159/62     Weight: 83 kg (182 lb 15.7 oz)  Body mass index is 28.66 kg/m².    Intake/Output Summary (Last 24 hours) at 5/12/2022 1303  Last data filed at 5/12/2022 0600  Gross per 24 hour   Intake 480 ml   Output --   Net 480 ml      Physical Exam  Vitals and nursing note reviewed.   Constitutional:       General: She is not in acute distress.  HENT:      Head: Atraumatic.      Right Ear: External ear normal.      Left Ear: External ear normal.      Nose: Nose normal.      Mouth/Throat:      Mouth: Mucous membranes are moist.   Eyes:      General: No scleral icterus.  Cardiovascular:      Rate and Rhythm: Normal rate.   Pulmonary:      Effort: Pulmonary effort is normal.   Abdominal:      General: There is no distension.   Musculoskeletal:         General: Normal range of motion.      Cervical back: Normal range of motion.       Comments: R foot bandage intact   Skin:     General: Skin is warm.   Neurological:      Mental Status: She is alert and oriented to person, place, and time.   Psychiatric:         Behavior: Behavior normal.       Significant Labs: All pertinent labs within the past 24 hours have been reviewed.  CBC:   Recent Labs   Lab 05/11/22  0512 05/12/22  0648   WBC 19.83* 23.45*   HGB 9.0* 10.7*   HCT 28.7* 34.5*    488*     CMP:   Recent Labs   Lab 05/11/22  0512 05/12/22  0648   * 132*   K 3.9 4.4   CL 92* 94*   CO2 22* 25   * 273*   BUN 81* 72*   CREATININE 8.5* 6.5*   CALCIUM 9.3 9.7   ANIONGAP 17* 13   EGFRNONAA 4.7* 6.6*       Significant Imaging: I have reviewed all pertinent imaging results/findings within the past 24 hours.      Assessment/Plan:      * Bacteremia  Gram positive rods on BC X 1 :Bacillus cereus   Had enteritis/Food poisoning which has been resolved(ate chinese food before onset of symptoms)  Repeat Blood cultures so far normal  S/p Incision/drainage/debridement of  Left foot deep tissue abscess below fascia muscle and tendon  Continue Daptomycin 500 mg IV q48h   Continue Zosyn 4.5 g IV q.12 until 5/13, then stop, per ID MD    Sepsis  Bacillus cereus bacteremia along with L foot abscess       Abscess of left foot  S/p Incision/drainage/debridement of  Left foot deep tissue abscess below fascia muscle and tendon  Need OR visit again tomorrow       Diabetic ulcer of left midfoot  Wound care  MRI r/o osteomyelitis  WBC scan showed medial left foot as source of infection  S/p Incision/drainage/debridement of  Left foot deep tissue abscess below fascia muscle and tendon  Possible OR visit again on 05/13/22    Discharge planning issues  LTAC stay denied by insurance  Awaiting SNF placement  Pt need one more OR visit tomorrow ? And after that pt can go to SNF       Chronic anemia  pRBC if needed along with Dialysis sessions  Received one unit of blood on 05/09      Diabetic foot  infection  As above         Foot infection  As above       Cough  Symptomatic management      Acute bronchitis  Symptomatic management      Elevated troponin  Likely demand ischemia in the setting of ESRD        Hypokalemia  Stable     Enteritis  Resolved     Type 2 diabetes mellitus with kidney complication, with long-term current use of insulin  Maintain present regime       ESRD (end stage renal disease)  HD sessions as scheduled         VTE Risk Mitigation (From admission, onward)         Ordered     heparin (porcine) injection 5,000 Units  As needed (PRN)         05/10/22 1541     IP VTE HIGH RISK PATIENT  Once         05/05/22 0003     Place sequential compression device  Until discontinued         05/05/22 0003                Discharge Planning   ERI: 5/16/2022     Code Status: Full Code   Is the patient medically ready for discharge?: No    Reason for patient still in hospital (select all that apply): Treatment and Pending disposition  Discharge Plan A: Skilled Nursing Facility   Discharge Delays: None known at this time              Usman Stacy MD  Department of Hospital Medicine   [facility]

## 2022-05-12 NOTE — ASSESSMENT & PLAN NOTE
S/p Incision/drainage/debridement of  Left foot deep tissue abscess below fascia muscle and tendon  Need OR visit again tomorrow

## 2022-05-12 NOTE — PT/OT/SLP PROGRESS
Physical Therapy Treatment    Patient Name:  Leanna García   MRN:  [id number]    Recommendations:     Discharge Recommendations:  LTACH (long-term acute care hospital), nursing facility, skilled   Discharge Equipment Recommendations: wheelchair   Barriers to discharge: increased assist with mobility    Assessment:     Leanna García is a 56 y.o. female admitted with a medical diagnosis of Bacteremia.  She presents with the following impairments/functional limitations:  weakness, impaired endurance, impaired self care skills, impaired functional mobilty, gait instability, impaired balance, impaired cardiopulmonary response to activity.  Pt agreeable to visit. Pt c/o of pain in L foot and that she'll do what she can. Pt wanting to sit up in the chair for a while. Pt required verbal cuing and had some difficulty following commands for transfer from bed to chair. Pt able to maintain NWB LLE throughout session. Pt tolerated session well.    Rehab Prognosis: Fair; patient would benefit from acute skilled PT services to address these deficits and reach maximum level of function.    Recent Surgery: * No surgery found *      Plan:     During this hospitalization, patient to be seen daily to address the identified rehab impairments via gait training, therapeutic activities, therapeutic exercises and progress toward the following goals:    · Plan of Care Expires:  06/07/22    Subjective     Chief Complaint: L foot pain  Patient/Family Comments/goals: wanting to sit up in the chair  Pain/Comfort:  · Pain Rating 1: 7/10  · Location - Side 1: Left  · Location 1: foot  · Pain Addressed 1: Distraction, Cessation of Activity      Objective:     Communicated with RN prior to session.  Patient found HOB elevated with telemetry, peripheral IV, wound vac upon PT entry to room.     General Precautions: Standard, fall   Orthopedic Precautions:LLE non weight bearing   Braces: N/A  Respiratory Status: Room air     Functional  Mobility:  · Bed Mobility:     · Supine to Sit: minimum assistance  · Transfers:     · Sit to Stand:  moderate assistance with rolling walker  · Bed to Chair: moderate assistance with  rolling walker  using  Step Transfer  · Gait: a few steps to the chair with RW and modA to maintain NWB LLE.      AM-PAC 6 CLICK MOBILITY          Therapeutic Activities and Exercises:   Pt educated on importance of time OOB, importance of intermittent mobility, safe techniques for transfers/ambulation, discharge recommendations/options, and use of call light for assistance and fall prevention.      Patient left up in chair with all lines intact, call button in reach and RN notified..    GOALS:   Multidisciplinary Problems     Physical Therapy Goals        Problem: Physical Therapy    Goal Priority Disciplines Outcome Goal Variances Interventions   Physical Therapy Goal     PT, PT/OT Ongoing, Progressing     Description: Goals to be met by: 22     Patient will increase functional independence with mobility by performin. Supine to sit with Modified [place]  2. Sit to supine with Modified [place]  3. Sit to stand transfer with Contact Guard Assistance  4. Bed to chair transfer with Contact Guard Assistance using Rolling Walker maintaining NWB L LE  5. Wheelchair propulsion x150 feet with Supervision using bilateral uppper extremities                     Time Tracking:     PT Received On: 22  PT Start Time: 853     PT Stop Time: 0916  PT Total Time (min): 23 min     Billable Minutes: Gait Training 8 and Therapeutic Activity 15    Treatment Type: Treatment  PT/PTA: PTA     PTA Visit Number: 2     2022

## 2022-05-12 NOTE — PT/OT/SLP PROGRESS
Occupational Therapy   Treatment    Name: Leanna García  MRN: [id number]  Admitting Diagnosis:  Bacteremia       Recommendations:     Discharge Recommendations: nursing facility, skilled  Discharge Equipment Recommendations:   (TBD)  Barriers to discharge:       Assessment:     Leanna García is a 56 y.o. female with a medical diagnosis of Bacteremia.  She presents agreeable to OT session. Performance deficits affecting function are weakness, impaired endurance, impaired self care skills, impaired functional mobilty, gait instability, impaired balance, impaired cardiopulmonary response to activity, orthopedic precautions, impaired skin.     Rehab Prognosis:  Good; patient would benefit from acute skilled OT services to address these deficits and reach maximum level of function.       Plan:     Patient to be seen 5 x/week to address the above listed problems via self-care/home management, therapeutic activities, therapeutic exercises  · Plan of Care Expires: 06/08/22  · Plan of Care Reviewed with: patient    Subjective     Pain/Comfort:  · Pain Rating 1: 8/10  · Location - Side 1: Left  · Location 1: foot  · Pain Addressed 1: Distraction, Cessation of Activity, Nurse notified  · Pain Rating Post-Intervention 1: 8/10    Objective:     Communicated with: nurse prior to session.  Patient found up in chair with telemetry, peripheral IV, wound vac upon OT entry to room.    General Precautions: Standard, fall   Orthopedic Precautions:LLE non weight bearing   Braces: N/A  Respiratory Status: Room air     Occupational Performance:     Functional Mobility/Transfers:  · Patient completed Sit <> Stand Transfer with minimum assistance  with  rolling walker     Activities of Daily Living:  · Lower Body Dressing: contact guard assistance donning socks      AMPAC 6 Click ADL: 19    Treatment & Education:  Role of OT, safety awareness, weight bearing precautions, importance of functional mobility and maintaining mobility through  LLE joints.  Pt completed 3 sit<>stand transfers with walker for min A 2x and CGA 1x. Pt improving each session.     Patient left up in chair with all lines intact, call button in reach and nurse notifiedEducation:      GOALS:   Multidisciplinary Problems     Occupational Therapy Goals        Problem: Occupational Therapy    Goal Priority Disciplines Outcome Interventions   Occupational Therapy Goal     OT, PT/OT Ongoing, Progressing    Description: Goals to be met by: discharge     Patient will increase functional independence with ADLs by performing:    UE Dressing with Supervision.  LE Dressing with Supervision.  Grooming while seated with Supervision.  Toileting from toilet with Supervision for hygiene and clothing management.   Supine to sit with Supervision.  Toilet transfer to toilet with Supervision.  Perform sitting/standing ADL activity while maintaining LLE NWB with no verbal cues.                     Time Tracking:     OT Date of Treatment: 05/12/22  OT Start Time: 1108  OT Stop Time: 1132  OT Total Time (min): 24 min    Billable Minutes:Self Care/Home Management 8  Therapeutic Activity 16    OT/HARVEY: OT          5/12/2022

## 2022-05-12 NOTE — PROGRESS NOTES
[facility]  Podiatry  Progress Note    Patient Name: Leanna García  MRN: [id number]  Admission Date: 5/4/2022  Hospital Length of Stay: 8 days  Attending Physician: Usman Stacy MD  Primary Care Provider: Stefano Lopez MD     Subjective:     Interval History:  Patient seen at bedside with  and family present.  Wound VAC is intact and functioning.  She states mild pain to the left foot.  Otherwise no new complaints.        Scheduled Meds:   ascorbic acid (vitamin C)  500 mg Oral Daily    aspirin  81 mg Oral Daily    azelastine  1 spray Nasal BID    calcium acetate(phosphat bind)  667 mg Oral TID WM    cetirizine  10 mg Oral Every other day    collagenase   Topical (Top) Daily    DAPTOmycin (CUBICIN)  IV  500 mg Intravenous Q48H    epoetin chris-epbx  100 Units/kg Intravenous Every Mon, Wed, Fri    fluticasone propionate  2 spray Each Nostril Daily    insulin detemir U-100  20 Units Subcutaneous QHS    metoprolol tartrate  25 mg Oral BID    multivitamin  1 tablet Oral Daily    piperacillin-tazobactam (ZOSYN) IVPB  3.375 g Intravenous Q12H     Continuous Infusions:  PRN Meds:sodium chloride, sodium chloride 0.9%, sodium chloride 0.9%, acetaminophen, benzonatate, dextrose 50%, dextrose 50%, guaiFENesin-codeine 100-10 mg/5 ml, heparin (porcine), HYDROcodone-acetaminophen, insulin aspart U-100, melatonin, naloxone, ondansetron, polyethylene glycol, sodium chloride 0.9%, sodium chloride 0.9%, sodium chloride 0.9%    Review of Systems   Constitutional: Negative for chills, fatigue, fever and unexpected weight change.   HENT: Negative for hearing loss and trouble swallowing.    Eyes: Negative for photophobia and visual disturbance.   Respiratory: Negative for cough, shortness of breath and wheezing.    Cardiovascular: Negative for chest pain, palpitations and leg swelling.   Gastrointestinal: Negative for abdominal pain and nausea.   Genitourinary: Negative for dysuria and frequency.  "  Musculoskeletal: Positive for arthralgias, gait problem and joint swelling. Negative for back pain and myalgias.   Skin: Positive for color change and wound. Negative for rash.   Neurological: Positive for weakness and numbness. Negative for tremors, seizures and headaches.   Hematological: Does not bruise/bleed easily.     Objective:     Vital Signs (Most Recent):  Temp: 98.7 °F (37.1 °C) (05/12/22 0722)  Pulse: (!) 56 (05/12/22 0722)  Resp: 17 (05/12/22 0722)  BP: (!) 159/62 (05/12/22 0946)  SpO2: (!) 92 % (05/12/22 0722) Vital Signs (24h Range):  Temp:  [97.9 °F (36.6 °C)-102.8 °F (39.3 °C)] 98.7 °F (37.1 °C)  Pulse:  [52-74] 56  Resp:  [17-18] 17  SpO2:  [92 %-96 %] 92 %  BP: (124-159)/(47-62) 159/62     Weight: 83 kg (182 lb 15.7 oz)  Body mass index is 28.66 kg/m².    Foot Exam    Laboratory:  A1C:   Recent Labs   Lab 03/10/22  0650 05/05/22  0320   HGBA1C >14.0* 10.6*     CBC:   Recent Labs   Lab 05/12/22  0648   WBC 23.45*   RBC 3.78*   HGB 10.7*   HCT 34.5*   *   MCV 91   MCH 28.3   MCHC 31.0*     CMP:   Recent Labs   Lab 05/12/22  0648   *   CALCIUM 9.7   *   K 4.4   CO2 25   CL 94*   BUN 72*   CREATININE 6.5*     CRP:   Recent Labs   Lab 05/10/22  0443   CRP 23.04*     ESR: No results for input(s): SEDRATE in the last 168 hours.  Wound Cultures:   Recent Labs   Lab 05/06/22  0753   LABAERO METHICILLIN RESISTANT STAPHYLOCOCCUS AUREUS  Many  Results called to and read back by Kathy Maki RN-BCARD;    05/08/2022  08:32 CJD  *       Diagnostic Results:  I have reviewed all pertinent imaging results/findings within the past 24 hours.     NM Inflammitory Localization WB Indium  Reason: Sepsis; Foot ulcer with likely infection Per Dr. TONY Bruno's Chart Notes:  "bilateral Charcot deformity presented to ED with complaints of fever.  On presentation to the ED she was noted to have 2 ulcerations to the plantar and medial aspects of the left foot.  Patient is unsure exactly when; " "these wounds started."; ; Patient had Left-midfoot debridement, yesterday, 05/06/2022.    Radiopharmaceutical: 310 uCi indium-111 radiolabeled autologous white blood cells    COMPARISON: Left foot MRI 5/5/2022    FINDINGS:  Anterior and posterior whole-body planar imaging with spot views of the left foot show physiologic radiopharmaceutical distribution throughout the liver, spleen, and bone marrow.    Diffuse abnormal radiopharmaceutical distribution occurs along medial left foot, with elongated distribution.    IMPRESSION:  Abnormal indium-111 WBC uptake along medial left foot suggesting source of infection.    Electronically signed by:  Brett Sanchez MD  5/7/2022 3:22 PM CDT Workstation: [id number]  US Lower Extremity Arteries Bilateral  Reason: Pvd    COMPARISON: None    FINDINGS:  Grayscale, color and spectral Doppler analysis of the bilateral lower extremity arteries was performed.  Right leg:  Grayscale images show diffuse scattered calcified plaque. Monophasic spectral waveforms are present throughout. No focal arterial occlusion or elevated peak systolic velocity to suggest a hemodynamically significant stenosis.    Left leg:  Grayscale images show diffuse scattered calcified plaque. Monophasic spectral waveforms are present throughout. No focal arterial occlusion or elevated peak systolic velocity to suggest a hemodynamically significant stenosis.    IMPRESSION:    1. No arterial occlusion or hemodynamically significant stenosis identified.  2. Diffuse bilateral lower extremity peripheral arterial disease.    Electronically signed by:  Brett Sanchez MD  5/7/2022 6:09 AM CDT Workstation: [id number]        Clinical Findings:  Wound VAC is intact to the left foot and functioning well.  There is still a moderate amount of edema and erythema along the medial aspect of the foot.  Mildly tender to palpation.    Assessment/Plan:     Active Diagnoses:    Diagnosis Date Noted POA    PRINCIPAL PROBLEM:  Bacteremia " [R78.81] 05/05/2022 Unknown    Discharge planning issues [Z02.9] 05/11/2022 Not Applicable    Abscess of left foot [L02.612] 05/08/2022 Yes    Chronic anemia [D64.9] 05/08/2022 Unknown    Foot infection [L08.9]  Yes    Diabetic foot infection [E11.628, L08.9]  Yes    Sepsis [A41.9]  Yes    Diabetic ulcer of left midfoot [E11.621, L97.429] 05/05/2022 Yes    Enteritis [K52.9] 05/05/2022 Yes    Hypokalemia [E87.6] 05/05/2022 Yes    Elevated troponin [R77.8] 05/05/2022 Yes    Acute bronchitis [J20.9]  Unknown    Cough [R05.9]  Unknown    Type 2 diabetes mellitus with kidney complication, with long-term current use of insulin [E11.29, Z79.4] 06/25/2020 Not Applicable    ESRD (end stage renal disease) [N18.6] 05/18/2018 Yes      Problems Resolved During this Admission:    Diagnosis Date Noted Date Resolved POA    Type 2 diabetes mellitus with hyperglycemia, without long-term current use of insulin [E11.65] 06/25/2020 05/05/2022 Yes       I discussed with the patient and family that there is still infection in her left foot.  Given the continued swelling and redness despite drainage and wound VAC therapy she will need more extensive incision and drainage in the operating room.  All risk and benefits were discussed with the patient and her family.  All questions were answered.  Plan will be for incision and drainage in the operating room tomorrow.  Following surgery wound VAC will be reapplied to the left foot.  NPO after midnight.    Ricardo Bruno DPM  Podiatry  [facility]

## 2022-05-12 NOTE — PLAN OF CARE
Patient accepted by [facility] and auth granted. Patient requires continued medical care per Dr Stacy due to being febrile. ERI 5/13. [place] at [facility]. CM contacted [facility] in an effort to arrange dialysis in [place] while patient is in SNF (transient). Patient currently receives outpatient dialysis at [facility] and plans to resume dialysis in [facility] after SNF is complete.         05/12/22 1500   Discharge Reassessment   Assessment Type Discharge Planning Reassessment   Did the patient's condition or plan change since previous assessment? Yes   Discharge Plan discussed with: Patient   Discharge Plan A Skilled Nursing Facility   Discharge Plan B Skilled Nursing Facility   DME Needed Upon Discharge  none   Why the patient remains in the hospital Requires continued medical care   Post-Acute Status   Post-Acute Authorization Placement   Post-Acute Placement Status Set-up Complete/Auth obtained   Discharge Delays (!) Change in Medical Condition

## 2022-05-12 NOTE — PROGRESS NOTES
INPATIENT NEPHROLOGY Progress Note  [facility] NEPHROLOGY    Leanna García  05/12/2022    Reason for consultation:    ESRD    Chief Complaint:   Chief Complaint   Patient presents with    Fever          History of Present Illness:    Per H and P    Ms. García is a 56-year-old female with a past medical history of ESRD on HD Monday Wednesday Friday, hypertension, IDDM2, PAF, and chronic cough who presents today with complaints of fever up to 103. It is severe.  It is associated with cough, posttussive vomiting, fatigue, sinus congestion, weakness, and a left foot ulcer.  She denies chest pain, diarrhea, dizziness, loss of consciousness.  In the ED she was noted to have a large left foot ulceration at the plantar surface and on the side of the foot.  CT of abdomen pelvis also reveals possible enteritis and bilateral perinephric fat stranding.  WBCs 21 K, troponin is 0.4 in the setting of ESRD.  She did not go to dialysis today she felt too bad.  Glucose is 446, anion gap 20, bicarb 24.     5/5  C/o foot pain.  Mild dyspnea.  No vomiting, chest pain, urinary or bowel complaints.  Weak  5/6  Currently getting wound vac placed.  Has foot pain. No respiratory distress  5/7 still spiking fevers.  Foot pain.  Stopped hd early yesterday  5/8  Tired today.  No vomiting or sob.  Tmax 101.9 at 1900 yesterday  5/9 wound vac today  5/10 no issues with HD yest- got 1u of blood- net UF 2.6L- waiting for wound vac- gave ok with PICC  5/11 febrile, BP stable, on 2L NC- seen on HD- no complaints; has wound vac; looking to place at [facility]  5/12 plan for [facility]    Plan of Care:     ESRD on HD MWF  --continue dialysis per routine    Hypertension  --fluid restrict 1.5L/day  --low salt diet 2g/day  --uf with hd    Hyponatremia  --fluid restrict  --140 Na dialysate    SHPT  --phos is below goa- change binder to 1 tab with meals     Anemia of CKD  --Hb is better after transfusion on 5/9  --continue FREMÍN with HD    Diab Foot  Ulcer- MRSA  --wound vac in place  --dose antbx for CrCl< 10/HD    Thank you for allowing us to participate in this patient's care. We will continue to follow.    Vital Signs:  Temp Readings from Last 3 Encounters:   05/12/22 98.7 °F (37.1 °C) (Oral)   04/26/22 98.2 °F (36.8 °C)   03/10/22 97.3 °F (36.3 °C) (Tympanic)       Pulse Readings from Last 3 Encounters:   05/12/22 (!) 56   04/26/22 66   04/05/22 75       BP Readings from Last 3 Encounters:   05/12/22 (!) 159/62   04/28/22 (!) 180/82   04/26/22 (!) 174/66       Weight:  Wt Readings from Last 3 Encounters:   05/11/22 83 kg (182 lb 15.7 oz)   05/06/22 86.6 kg (191 lb)   04/28/22 94 kg (207 lb 5.5 oz)       Medications:  No current facility-administered medications on file prior to encounter.     Current Outpatient Medications on File Prior to Encounter   Medication Sig Dispense Refill    ascorbic acid, vitamin C, (VITAMIN C) 500 MG tablet Take 500 mg by mouth once daily.      aspirin (ECOTRIN) 81 MG EC tablet Take 81 mg by mouth once daily.      atorvastatin (LIPITOR) 80 MG tablet Take 1 tablet (80 mg total) by mouth once daily. (Patient taking differently: Take 80 mg by mouth every evening.) 90 tablet 3    blood sugar diagnostic Strp To check BG 4 times daily, to use with insurance preferred meter (Patient taking differently: 1 strip by Misc.(Non-Drug; Combo Route) route 4 (four) times daily. To check BG 4 times daily, to use with insurance preferred meter) 450 strip 3    insulin aspart U-100 (NOVOLOG FLEXPEN U-100 INSULIN) 100 unit/mL (3 mL) InPn pen Inject 5-8 units 3 times per day with food. 1 Box 6    insulin detemir U-100 (LEVEMIR FLEXTOUCH U-100 INSULN) 100 unit/mL (3 mL) InPn pen Inject 15-18 Units into the skin once daily. 1 Box 6    lancets Misc To use to check blood sugar 4 times daily. To use with insurance approved meter. Patient needs the round, purple one (Patient taking differently: 1 lancet by Misc.(Non-Drug; Combo Route) route 4 (four)  "times daily. To use to check blood sugar 4 times daily. To use with insurance approved meter. Patient needs the round, purple one) 450 each 3    metoprolol tartrate (LOPRESSOR) 25 MG tablet Take 25 mg by mouth 2 (two) times daily.      minoxidiL (LONITEN) 2.5 MG tablet Take 5 mg by mouth 2 (two) times daily.      multivitamin (THERAGRAN) per tablet Take 1 tablet by mouth once daily.      pen needle, diabetic (BD ULTRA-FINE ROBERT PEN NEEDLE) 32 gauge x 5/32" Ndle To use 4 times per day with insulin injections. (Patient taking differently: 1 pen by Misc.(Non-Drug; Combo Route) route 4 (four) times daily. To use 4 times per day with insulin injections.) 450 each 2    sucroferric oxyhydroxide (VELPHORO) 500 mg Chew Take 500 mg by mouth 3 (three) times daily.      dextrose (GLUCOSE GEL) 40 % gel Take 37.5 mLs (15,000 mg total) by mouth once as needed (hypoglycemia). 37.5 g 4    gabapentin (NEURONTIN) 300 MG capsule Take 300 mg by mouth every evening.      glucagon (BAQSIMI) 3 mg/actuation Spry 3 mg (one actuation) into a single nostril; if no response, may repeat in 15 minutes using a new intranasal device. (Patient taking differently: 3 mg by Left Nostril route as needed. 3 mg (one actuation) into a single nostril; if no response, may repeat in 15 minutes using a new intranasal device.) 2 each 1    [DISCONTINUED] blood-glucose meter (ACCU-CHEK KAYLIE PLUS METER) Misc To use to check blood sugars 4 times a day 1 each 0    [DISCONTINUED] clorazepate (TRANXENE) 3.75 MG Tab Take 7.5 mg by mouth nightly as needed.       [DISCONTINUED] fenofibrate 160 MG Tab Take 1 tablet (160 mg total) by mouth once daily. 90 tablet 3    [DISCONTINUED] lancing device with lancets (ACCU-CHEK SOFT DEV LANCETS) Kit To check blood sugars 4 times per day 400 each 3     Scheduled Meds:   ascorbic acid (vitamin C)  500 mg Oral Daily    aspirin  81 mg Oral Daily    azelastine  1 spray Nasal BID    calcium acetate(phosphat bind)  667 mg " "Oral TID WM    cetirizine  10 mg Oral Every other day    collagenase   Topical (Top) Daily    DAPTOmycin (CUBICIN)  IV  500 mg Intravenous Q48H    epoetin chris-epbx  100 Units/kg Intravenous Every Mon, Wed, Fri    fluticasone propionate  2 spray Each Nostril Daily    insulin detemir U-100  20 Units Subcutaneous QHS    metoprolol tartrate  25 mg Oral BID    multivitamin  1 tablet Oral Daily    piperacillin-tazobactam (ZOSYN) IVPB  3.375 g Intravenous Q12H     Continuous Infusions:  PRN Meds:.sodium chloride, sodium chloride 0.9%, sodium chloride 0.9%, acetaminophen, benzonatate, dextrose 50%, dextrose 50%, guaiFENesin-codeine 100-10 mg/5 ml, heparin (porcine), HYDROcodone-acetaminophen, insulin aspart U-100, melatonin, naloxone, ondansetron, polyethylene glycol, sodium chloride 0.9%, sodium chloride 0.9%, sodium chloride 0.9%    Review of Systems:  Neg    Physical Exam:    BP (!) 159/62   Pulse (!) 56   Temp 98.7 °F (37.1 °C) (Oral)   Resp 17   Ht 5' 7" (1.702 m)   Wt 83 kg (182 lb 15.7 oz)   SpO2 (!) 92%   BMI 28.66 kg/m²     Constitutional: nad, aao x 3  Heart: rrr, no m/r/g, wwp, no edema  Lungs: ctab, no w/r/r/c, no lb  Abdomen: s/nt/nd, +BS    Results:  Lab Results   Component Value Date     (L) 05/12/2022    K 4.4 05/12/2022    CL 94 (L) 05/12/2022    CO2 25 05/12/2022    BUN 72 (H) 05/12/2022    CREATININE 6.5 (H) 05/12/2022    CALCIUM 9.7 05/12/2022    ANIONGAP 13 05/12/2022    ESTGFRAFRICA 7.6 (A) 05/12/2022    EGFRNONAA 6.6 (A) 05/12/2022       Lab Results   Component Value Date    CALCIUM 9.7 05/12/2022    PHOS 3.3 05/10/2022       Recent Labs   Lab 05/12/22  0648   WBC 23.45*   RBC 3.78*   HGB 10.7*   HCT 34.5*   *   MCV 91   MCH 28.3   MCHC 31.0*        Imaging Results          X-Ray Foot Complete Left (Final result)  Result time 05/05/22 06:12:52    Final result by Denise Estrada MD (05/05/22 06:12:52)                 Narrative:    Three views of the left foot are " compared to prior study dated 12/3/2021    Clinical history is infection    There is osteopenia. There has been prior amputation of the fourth digit at the base of the proximal phalanx.    There are moderate degenerative changes of the midfoot and hindfoot with joint space narrowing and spurring.. There are no fractures, dislocations or osteolysis. There is a subcutaneous ulceration the mid plantar soft tissues. There is moderate peripheral vascular calcification.    IMPRESSION: Prior amputation of fourth digit without radiographic evidence for osteomyelitis.    Moderate degenerative changes of the foot    16mm soft tissue ulceration in the mid plantar medial soft tissues    Electronically signed by:  Denise Estrada MD  5/5/2022 6:12 AM CDT Workstation: [id number]                             CT Abdomen Pelvis  Without Contrast (Final result)  Result time 05/04/22 21:53:02    Final result by Ernesto Lino MD (05/04/22 21:53:02)                 Narrative:    EXAM: CT ABDOMEN PELVIS WITHOUT CONTRAST    RadLex: CT ABDOMEN PELVIS WITHOUT IV CONTRAST    HISTORY: 56 years  Female;  Abdominal pain, acute, nonlocalized;    TECHNIQUE:   Standard CT of the abdomen and pelvis was performed without the use of intravenous contrast media. Lack of intravenous contrast limits evaluation of soft tissue structures in this study.    CT scans at this facility use dose modulation, iterative reconstruction and/or weight based dosing when appropriate to reduce radiation dose to as low as reasonably achievable.    COMPARISON: None available    FINDING(S):    ABDOMEN:    Lung bases show dependent atelectasis. Coronary artery calcifications are seen. Cardiac size normal. Mild thickening of the distal esophagus with small hiatal hernia.    Small amount of fluid is seen in the stomach. Mild thickening of the stomach lining.    Mild hepatic steatosis. Spleen, pancreas, and both adrenals are normal.    There is gallbladder distention with  multiple gallstones.    Neither kidney shows hydronephrosis. There is moderate bilateral perinephric stranding which appears above expected and may represent inflammation. Correlate with urinalysis to exclude the possibility of pyelonephritis.    There are vascular calcifications involving both kidneys. There may be separate small stones as well. The ureters themselves are decompressed.    Marked atherosclerosis affects the aorta and the major branch vessels. No abdominal lymphadenopathy or free abdominal fluid.    The small intestines contain a small to moderate amount of fluid with scattered air-fluid levels. No transition point to suggest obstruction.    No evidence of appendicitis. Oral contrast is seen in the intestines. There is a mild to moderate stool burden. Scattered colonic diverticulosis.    PELVIS:    Bladder is decompressed. No free fluid within the pelvis. There is an enlarged left external iliac lymph node measuring 1.6 x 0.8 cm on series 3, image 210.    No other groin or pelvis lymphadenopathy. A small left inguinal hernia contains only fat.    Small umbilical hernia contains only fat.    There are degenerative changes in the lower lumbar spine.    IMPRESSION:    1.  Small to moderate amount of fluid in the small intestines with scattered air fluid levels, possibly enteritis  2.  Moderate bilateral perinephric stranding. Correlate with urinalysis to exclude the possibility of pyelonephritis  3.  Gallbladder distention with gallstones    Electronically signed by:  Ernesto Lino MD  5/4/2022 9:53 PM CDT Workstation: [id number]                             X-Ray Chest 1 View (Final result)  Result time 05/05/22 06:10:56    Final result by Denise Estrada MD (05/05/22 06:10:56)                 Narrative:    XR CHEST 1 VIEW    CLINICAL HISTORY:  56 years Female pain    COMPARISON: 5/19/2021    FINDINGS: Cardiomediastinal silhouette is within normal limits. Lungs are normally expanded with no airspace  consolidation. No pleural effusion or pneumothorax. No acute osseous abnormality.    IMPRESSION: No acute pulmonary process.    Electronically signed by:  Denise Estrada MD  5/5/2022 6:10 AM CDT Workstation: [id number]                                I have personally reviewed pertinent radiological imaging and reports.     Patient care was time spent personally by me on the following activities: > 35 min  · Obtaining a history  · Examination of patient.  · Providing medical care at the patients bedside.  · Developing a treatment plan with patient or surrogate and bedside caregivers  · Ordering and reviewing laboratory studies, radiographic studies, pulse oximetry.  · Ordering and performing treatments and interventions.  · Evaluation of patient's response to treatment.  · Discussions with consultants while on the unit and immediately available to the patient.  · Re-evaluation of the patient's condition.  · Documentation in the medical record.     Ashleigh Soria MD MPH  [facility] [place]  [address] [place] [id number]  [phone] (p)  [phone] (f)

## 2022-05-12 NOTE — ASSESSMENT & PLAN NOTE
Wound care  MRI r/o osteomyelitis  WBC scan showed medial left foot as source of infection  S/p Incision/drainage/debridement of  Left foot deep tissue abscess below fascia muscle and tendon  Possible OR visit again on 05/13/22

## 2022-05-12 NOTE — SUBJECTIVE & OBJECTIVE
Interval History:     Review of Systems   Constitutional:  Negative for activity change and appetite change.   HENT:  Negative for congestion and dental problem.    Eyes:  Negative for discharge and itching.   Respiratory:  Negative for shortness of breath.    Cardiovascular:  Negative for chest pain.   Gastrointestinal:  Negative for abdominal distention and abdominal pain.   Endocrine: Negative for cold intolerance.   Genitourinary:  Negative for difficulty urinating and dysuria.   Musculoskeletal:  Positive for arthralgias. Negative for back pain.   Skin:  Positive for wound. Negative for color change.   Neurological:  Negative for dizziness and facial asymmetry.   Hematological:  Negative for adenopathy.   Psychiatric/Behavioral:  Negative for agitation and behavioral problems.    Objective:     Vital Signs (Most Recent):  Temp: 98.7 °F (37.1 °C) (05/12/22 0722)  Pulse: (!) 56 (05/12/22 0722)  Resp: 17 (05/12/22 0722)  BP: (!) 159/62 (05/12/22 0946)  SpO2: (!) 92 % (05/12/22 0722)   Vital Signs (24h Range):  Temp:  [97.9 °F (36.6 °C)-102.8 °F (39.3 °C)] 98.7 °F (37.1 °C)  Pulse:  [52-74] 56  Resp:  [17-18] 17  SpO2:  [92 %-96 %] 92 %  BP: (124-159)/(47-62) 159/62     Weight: 83 kg (182 lb 15.7 oz)  Body mass index is 28.66 kg/m².    Intake/Output Summary (Last 24 hours) at 5/12/2022 1303  Last data filed at 5/12/2022 0600  Gross per 24 hour   Intake 480 ml   Output --   Net 480 ml      Physical Exam  Vitals and nursing note reviewed.   Constitutional:       General: She is not in acute distress.  HENT:      Head: Atraumatic.      Right Ear: External ear normal.      Left Ear: External ear normal.      Nose: Nose normal.      Mouth/Throat:      Mouth: Mucous membranes are moist.   Eyes:      General: No scleral icterus.  Cardiovascular:      Rate and Rhythm: Normal rate.   Pulmonary:      Effort: Pulmonary effort is normal.   Abdominal:      General: There is no distension.   Musculoskeletal:         General:  Normal range of motion.      Cervical back: Normal range of motion.      Comments: R foot bandage intact   Skin:     General: Skin is warm.   Neurological:      Mental Status: She is alert and oriented to person, place, and time.   Psychiatric:         Behavior: Behavior normal.       Significant Labs: All pertinent labs within the past 24 hours have been reviewed.  CBC:   Recent Labs   Lab 05/11/22 0512 05/12/22  0648   WBC 19.83* 23.45*   HGB 9.0* 10.7*   HCT 28.7* 34.5*    488*     CMP:   Recent Labs   Lab 05/11/22  0512 05/12/22  0648   * 132*   K 3.9 4.4   CL 92* 94*   CO2 22* 25   * 273*   BUN 81* 72*   CREATININE 8.5* 6.5*   CALCIUM 9.3 9.7   ANIONGAP 17* 13   EGFRNONAA 4.7* 6.6*       Significant Imaging: I have reviewed all pertinent imaging results/findings within the past 24 hours.

## 2022-05-12 NOTE — CONSULTS
Wound vac is functioning properly, setting at -150 megahertz, pt is sitting up in chair.  Nurse asked me to check pump.

## 2022-05-12 NOTE — ASSESSMENT & PLAN NOTE
LTAC stay denied by insurance  Awaiting SNF placement  Pt need one more OR visit tomorrow ? And after that pt can go to SNF

## 2022-05-12 NOTE — PROGRESS NOTES
NMConsult Note  Infectious Disease    Reason for Consult:  Bacillus cereus bacteremia, and MRSA osteomyelitis     HPI: Leanna García is a 56 y.o. female known to me from prior consultation in September of 2018, was admitted through the emergency room yesterday with fever 103,   Vomiting of 1 day's duration, and chronic sinus congestion, postnasal drip, cough at least several weeks duration and a foot ulcer on the plantar/medial surface surface of the left foot of probably several months duration.  The vomiting began within a few hours of a meal at a [employer] restaurant.  She did not have any diarrhea In the emergency room she had a CT scan of the abdomen which suggested possible enteritis and bilateral perinephric fat stranding, as well as cholelithiasis, and extensive vascular calcifications.  her blood sugar was 446, white blood cells 21,000, normal lactic acid.  Photographs taken in the emergency room suggest cellulitis of the medial and plantar foot She was started on Zosyn and doxycycline as she has history of vancomycin allergy. .  She was seen by Podiatry , and MRI did not demonstrate any abscess or drainage and wound care was ordered.  She was noted this morning to have difficulty swallowing and was seen by speech therapy.  Today 1 anaerobic bottle has a Gram-positive mariana prompting this consult.    5/6: interim reviewed. Still febrile through last night. Blood culture does have characteristics of Bacillus cereus. This will be sent out to [facility] for MALDI ID. Respiratory pcr panel was negative. She was able to eat solid food this am. She denies abdominal pain. Podiatry debrided her foot this am and submitted cultures and is ordering a WBC Scan. Coughing less. No wheezes.     5/7 (Daren):  Interim reviewed, discussed with Dr Bailon.  Patient seen and examined at bedside, states she does not feel good today, feels like she got something that is making her feel off, not her usual self.  Labile BP  121/58, low-grade fever T-max 102° yesterday, currently 100.8.  Wound cultures from left foot grew Staph aureus, pending sensitivities.  She is currently on Daptomycin, Zosyn and levofloxacin IV.    5/8: Interim reviewed, patient seen and examined at bedside. Feeling significantly better compared to yesterday.  Having lunch at bedside.  Hemodynamically stable, T-max 103° yesterday, currently afebrile.  Labs reviewed, white count 17, PMN 79.2%, no bands.  H&H 7/23.3, platelet count 332. Sodium 135, potassium 4.4.  Status post further debridement at bedside today by Podiatry, large deep tissue abscess seen on the plantar facial below the flexor tendon.  Mild necrosis noted and all nonviable tissue was debrided, cultures in process.  Micro updated, confirmed Bacillus cereus bacteremia, wound culture from left foot grew MRSA.    5/9 (Adamaris):  Interim reviewed.  Blood culture isolate confirmed to be bacillus serious, likely from the restaurant that she ate before the onset of her illness.  Wound cultures from 05/16//8 have MRSA.  As she is allergic to vancomycin she is on daptomycin.  Echocardiogram negative temperature is improved since the drainage of her foot though she did have a 101 temperature last night.  White blood cells remain elevated, hemoglobin 6.7, she is receiving blood, CRP is down about 10%. Not getting out of bed. Sugary soft drinks on her bedside table.   5/10: interim reviewed. Fever has finally resolved. Received blood with dialysis yesterday. Foot cultures have grown MSSA and MRSA. Currently struggling with constipation, miserable, on bedpan.   5/11: low grade temp again today. Per RN, wound care did note some purulence yesterday in foot wound at the time of wound vac application yesterday. Diarrhea? after laxatives. WBC still very high, midline in place now.   5/12: still has fever, and leukocytosis, plans for debridement in OR noted. KUB normal.  She currently has no complaints, is eating well,  no diarrhea, much improved cough and congestion compared to admission.  No IV site phlebitis, no rashes, no thrush, no inflammation associated with her left arm AV fistula, no dysuria.    EXAM & DIAGNOSTICS REVIEWED:     Vitals:     Temp:  [97.9 °F (36.6 °C)-102.8 °F (39.3 °C)]   Temp: 98.7 °F (37.1 °C) (05/12/22 0722)  Pulse: (!) 56 (05/12/22 0722)  Resp: 16 (05/12/22 1609)  BP: (!) 159/62 (05/12/22 0946)  SpO2: (!) 92 % (05/12/22 0722)    Intake/Output Summary (Last 24 hours) at 5/12/2022 1631  Last data filed at 5/12/2022 1551  Gross per 24 hour   Intake 480 ml   Output 400 ml   Net 80 ml        General:  In NAD. Alert and attentive, cooperative, comfortable on room air, completely nontoxic  Eyes:  Anicteric,  EOMI  ENT:  No ulcers, exudates, thrush, nares patent, edentulous  Neck:  Supple,   Lungs: Clear  Heart:  RRR, 2/6 systolic murmur +  Abd:  Soft, NT, none distended, normal BS, no guarding, no masses or organomegaly appreciated.  :  Voids  no flank tenderness  Musc:  Joints without effusion, swelling, erythema, synovitis but she does have lower extremity muscle wasting.  She has Charcot arthropathy with fallen arches bilaterally and an ulceration on the medial arch   Skin:  No rashes   Neuro: Alert, attentive, speech fluent, face symmetric, moves all extremities, no focal weakness.  Minimally Ambulatory at baseline  Psych:  Calm, cooperative  Lymphatic:        Extrem: Left foot with wound vac in place     No edema, erythema, phlebitis, warm and well perfused  VAD:  Left arm AV fistula  No redness , right arm midline    Isolation: contact - MRSA  Wound:           5/6:   This was debrided full thickness this am    5/8:        5/9: bandage changed, packing not disturbed  5/11: wound vac in place      General Labs reviewed:  Recent Labs   Lab 05/10/22  0443 05/11/22  0512 05/12/22  0648   WBC 18.47* 19.83* 23.45*   HGB 8.5* 9.0* 10.7*   HCT 26.8* 28.7* 34.5*    416 488*       Recent Labs   Lab  05/10/22  0443 05/11/22  0512 05/12/22  0648   * 131* 132*   K 3.8 3.9 4.4   CL 95 92* 94*   CO2 22* 22* 25   BUN 52* 81* 72*   CREATININE 6.5* 8.5* 6.5*   CALCIUM 9.1 9.3 9.7     Recent Labs   Lab 05/08/22  0723 05/10/22  0443   CRP 23.11* 23.04*         Micro:  Microbiology Results (last 7 days)     Procedure Component Value Units Date/Time    Blood culture [[id number]] Collected: 05/08/22 0723    Order Status: Completed Specimen: Blood Updated: 05/12/22 1032     Blood Culture, Routine No Growth to date      No Growth to date      No Growth to date      No Growth to date      No Growth to date    Narrative:      Collection has been rescheduled by SLR at 05/08/2022 06:15 Reason:   Unable to collect  Collection has been rescheduled by RE1 at 05/08/2022 06:55 Reason:   Unable to collect   Collection has been rescheduled by SLR at 05/08/2022 06:15 Reason:   Unable to collect  Collection has been rescheduled by RE1 at 05/08/2022 06:55 Reason:   Unable to collect     Culture, Anaerobic [[id number]] Collected: 05/08/22 1003    Order Status: Completed Specimen: Abscess from Foot, Left Updated: 05/11/22 1708     Anaerobic Culture No anaerobes isolated    Blood culture [[id number]] Collected: 05/06/22 1100    Order Status: Completed Specimen: Blood Updated: 05/11/22 1232     Blood Culture, Routine No growth after 5 days.    Narrative:      Collection has been rescheduled by RE1 at 05/06/2022 08:51 Reason:   Having a scan  Collection has been rescheduled by SLR at 05/06/2022 10:27 Reason:   Unable to collect  Collection has been rescheduled by RE1 at 05/06/2022 10:35 Reason:   Unable to collect  Collection has been rescheduled by RE1 at 05/06/2022 08:51 Reason:   Having a scan  Collection has been rescheduled by SLR at 05/06/2022 10:27 Reason:   Unable to collect  Collection has been rescheduled by RE1 at 05/06/2022 10:35 Reason:   Unable to collect    Tissue culture [[id number]]  (Abnormal)  (Susceptibility) Collected:  05/08/22 1003    Order Status: Completed Specimen: Tissue Updated: 05/10/22 0650     Aerobic Culture - Tissue STAPHYLOCOCCUS AUREUS  Rare       Gram Stain Result Moderate WBC's      No organisms seen    Blood culture #2 **CANNOT BE ORDERED STAT** [[id number]] Collected: 05/04/22 2155    Order Status: Completed Specimen: Blood from Peripheral, Antecubital, Right Updated: 05/09/22 2232     Blood Culture, Routine No growth after 5 days.    Blood culture [[id number]]  (Abnormal) Collected: 05/04/22 2040    Order Status: Completed Specimen: Blood Updated: 05/09/22 1604     Blood Culture, Routine Gram stain lukasz bottle: Gram positive rods      Results called to and read back by:Landy Nation RN-3000;  05/05/2022        10:52 CJD      BACILLUS CEREUS    Culture, Anaerobic [[id number]] Collected: 05/06/22 0753    Order Status: Completed Specimen: Wound from Foot, Left Updated: 05/08/22 1011     Anaerobic Culture No anaerobes isolated    Aerobic culture [[id number]] Collected: 05/08/22 1003    Order Status: Canceled Specimen: Tissue from Foot, Left     Aerobic culture [[id number]]  (Abnormal)  (Susceptibility) Collected: 05/06/22 0753    Order Status: Completed Specimen: Wound from Foot, Left Updated: 05/08/22 0832     Aerobic Bacterial Culture METHICILLIN RESISTANT STAPHYLOCOCCUS AUREUS  Many  Results called to and read back by Kathy Maki RN-BCARD;    05/08/2022  08:32 CJD      Culture, ID (Consult) [[id number]] Collected: 05/04/22 2040    Order Status: Completed Specimen: Blood Updated: 05/07/22 1422     Culture, Identification (consult) Bacillus cereus    Respiratory Infection Panel (PCR), Nasopharyngeal [[id number]] Collected: 05/05/22 1826    Order Status: Completed Specimen: Nasopharyngeal Swab Updated: 05/05/22 2126     Respiratory Infection Panel Source NP swab     Adenovirus Not Detected     Coronavirus 229E, Common Cold Virus Not Detected     Coronavirus HKU1, Common Cold Virus Not Detected     Coronavirus  NL63, Common Cold Virus Not Detected     Coronavirus OC43, Common Cold Virus Not Detected     Comment: The Coronavirus strains detected in this test cause the common cold.  These strains are not the COVID-19 (novel Coronavirus)strain   associated with the respiratory disease outbreak.          SARS-CoV2 (COVID-19) Qualitative PCR Not Detected     Human Metapneumovirus Not Detected     Human Rhinovirus/Enterovirus Not Detected     Influenza A (subtypes H1, H1-2009,H3) Not Detected     Influenza B Not Detected     Parainfluenza Virus 1 Not Detected     Parainfluenza Virus 2 Not Detected     Parainfluenza Virus 3 Not Detected     Parainfluenza Virus 4 Not Detected     Respiratory Syncytial Virus Not Detected     Bordetella Parapertussis (LN8148) Not Detected     Bordetella pertussis (ptxP) Not Detected     Chlamydia pneumoniae Not Detected     Mycoplasma pneumoniae Not Detected     Comment: Respiratory Infection Panel testing performed by Multiplex PCR.       Narrative:      Respiratory Infection Panel source->NP Swab          Imaging Reviewed:   CXR   CT abdomen and pelvis 5/4   1.  Small to moderate amount of fluid in the small intestines with scattered air fluid levels, possibly enteritis 2.  Moderate bilateral perinephric stranding. Correlate with urinalysis to exclude the possibility of pyelonephritis 3.  Gallbladder distention with gallstones     MRI of the left midfoot 5/5  IMPRESSION: 16mm skin ulceration in the medial plantar soft tissues with associated cellulitis. There is no abscess or osteomyelitis   Moderate degenerative changes of the midfoot    NM scan Abnormal indium-111 WBC uptake along medial left foot suggesting source of infection.     KUB 5/12 WNL    Cardiology:   ECHO 5/6/22  The left ventricle is normal in size with normal systolic function.  The estimated ejection fraction is 65%.  Normal left ventricular diastolic function.  Normal right ventricular size with normal right ventricular systolic  function.  Mild mitral regurgitation.  All valves visualized, normal.    IMPRESSION & PLAN     1. Bacillus cereus bacteremia likely in the setting of recent Chinese food ingestion   Blood cultures 5/6 & 5/8 no growth, pending final   Echocardiogram negative   [facility] department notified    2. Left foot ulcer, deep tissue abscess/osteomyelitis status post debridement by Podiatry 5/6 and 5/8, deep pocket of pus drained 5/9   Wound culture 5/6 MRSA and MSSA    3.  ESRD on HD,  Nephrology following    4.  Diabetes with hyperglycemia, A1c 14%  5. constipation     Recommendations:  Awaiting further debridement of foot in OR tomorrow 5/13  Continue Daptomycin 500 mg IV q48h for MRSA, MSSA and Bacillus cereus  Weekly cpk, hold statins while on daptomycin  Continue Zosyn 4.5 g IV q.12 until 5/13, then stop     Patient will benefit from LTAC to continue wound care to left foot with wound VAC and IV antibiotics.         Needs aggressive glycemic control  D/w  Patient      Will follow      Medical Decision Making during this encounter was  [_] Low Complexity  [_] Moderate Complexity  [x  ] High Complexity

## 2022-05-12 NOTE — PLAN OF CARE
Important Message from Medicare was sign, explained and given to patient/caregiver on 05/12/2022 at 10:49am     addressed any questions or concerns.    Important Message from Medicare document will be scanned into patient's medical record

## 2022-05-13 ENCOUNTER — ANESTHESIA (OUTPATIENT)
Dept: SURGERY | Facility: HOSPITAL | Age: 57
DRG: 853 | End: 2022-05-13
Payer: MEDICARE

## 2022-05-13 LAB
ANION GAP SERPL CALC-SCNC: 14 MMOL/L (ref 8–16)
BACTERIA BLD CULT: NORMAL
BASOPHILS # BLD AUTO: ABNORMAL K/UL (ref 0–0.2)
BASOPHILS NFR BLD: 0 % (ref 0–1.9)
BUN SERPL-MCNC: 104 MG/DL (ref 6–20)
CALCIUM SERPL-MCNC: 10.1 MG/DL (ref 8.7–10.5)
CHLORIDE SERPL-SCNC: 92 MMOL/L (ref 95–110)
CO2 SERPL-SCNC: 25 MMOL/L (ref 23–29)
CREAT SERPL-MCNC: 8 MG/DL (ref 0.5–1.4)
CRP SERPL-MCNC: 20.1 MG/DL
DIFFERENTIAL METHOD: ABNORMAL
EOSINOPHIL # BLD AUTO: ABNORMAL K/UL (ref 0–0.5)
EOSINOPHIL NFR BLD: 0 % (ref 0–8)
ERYTHROCYTE [DISTWIDTH] IN BLOOD BY AUTOMATED COUNT: 15.5 % (ref 11.5–14.5)
EST. GFR  (AFRICAN AMERICAN): 5.9 ML/MIN/1.73 M^2
EST. GFR  (NON AFRICAN AMERICAN): 5.1 ML/MIN/1.73 M^2
GLUCOSE SERPL-MCNC: 118 MG/DL (ref 70–110)
GLUCOSE SERPL-MCNC: 119 MG/DL (ref 70–110)
GLUCOSE SERPL-MCNC: 150 MG/DL (ref 70–110)
GLUCOSE SERPL-MCNC: 153 MG/DL (ref 70–110)
GLUCOSE SERPL-MCNC: 154 MG/DL (ref 70–110)
GLUCOSE SERPL-MCNC: 156 MG/DL (ref 70–110)
HCT VFR BLD AUTO: 29.8 % (ref 37–48.5)
HGB BLD-MCNC: 9.5 G/DL (ref 12–16)
IMM GRANULOCYTES # BLD AUTO: ABNORMAL K/UL (ref 0–0.04)
IMM GRANULOCYTES NFR BLD AUTO: ABNORMAL % (ref 0–0.5)
LYMPHOCYTES # BLD AUTO: ABNORMAL K/UL (ref 1–4.8)
LYMPHOCYTES NFR BLD: 10 % (ref 18–48)
MCH RBC QN AUTO: 27.9 PG (ref 27–31)
MCHC RBC AUTO-ENTMCNC: 31.9 G/DL (ref 32–36)
MCV RBC AUTO: 88 FL (ref 82–98)
METAMYELOCYTES NFR BLD MANUAL: 1 %
MONOCYTES # BLD AUTO: ABNORMAL K/UL (ref 0.3–1)
MONOCYTES NFR BLD: 5 % (ref 4–15)
NEUTROPHILS NFR BLD: 81 % (ref 38–73)
NEUTS BAND NFR BLD MANUAL: 3 %
NRBC BLD-RTO: 0 /100 WBC
PLATELET # BLD AUTO: 470 K/UL (ref 150–450)
PLATELET BLD QL SMEAR: ABNORMAL
PMV BLD AUTO: 9.8 FL (ref 9.2–12.9)
POTASSIUM SERPL-SCNC: 4.6 MMOL/L (ref 3.5–5.1)
RBC # BLD AUTO: 3.4 M/UL (ref 4–5.4)
SODIUM SERPL-SCNC: 131 MMOL/L (ref 136–145)
WBC # BLD AUTO: 24.1 K/UL (ref 3.9–12.7)

## 2022-05-13 PROCEDURE — 63600175 PHARM REV CODE 636 W HCPCS: Mod: JG | Performed by: INTERNAL MEDICINE

## 2022-05-13 PROCEDURE — 27201107 HC STYLET, STANDARD: Performed by: ANESTHESIOLOGY

## 2022-05-13 PROCEDURE — 85027 COMPLETE CBC AUTOMATED: CPT | Performed by: NURSE PRACTITIONER

## 2022-05-13 PROCEDURE — 36415 COLL VENOUS BLD VENIPUNCTURE: CPT | Performed by: NURSE PRACTITIONER

## 2022-05-13 PROCEDURE — 90935 HEMODIALYSIS ONE EVALUATION: CPT

## 2022-05-13 PROCEDURE — 99900031 HC PATIENT EDUCATION (STAT)

## 2022-05-13 PROCEDURE — 36000705 HC OR TIME LEV I EA ADD 15 MIN: Performed by: PODIATRIST

## 2022-05-13 PROCEDURE — 87070 CULTURE OTHR SPECIMN AEROBIC: CPT | Performed by: PODIATRIST

## 2022-05-13 PROCEDURE — 37000009 HC ANESTHESIA EA ADD 15 MINS: Performed by: PODIATRIST

## 2022-05-13 PROCEDURE — 27201423 OPTIME MED/SURG SUP & DEVICES STERILE SUPPLY: Performed by: PODIATRIST

## 2022-05-13 PROCEDURE — 86140 C-REACTIVE PROTEIN: CPT | Performed by: INTERNAL MEDICINE

## 2022-05-13 PROCEDURE — 36000704 HC OR TIME LEV I 1ST 15 MIN: Performed by: PODIATRIST

## 2022-05-13 PROCEDURE — 85007 BL SMEAR W/DIFF WBC COUNT: CPT | Performed by: NURSE PRACTITIONER

## 2022-05-13 PROCEDURE — 87102 FUNGUS ISOLATION CULTURE: CPT | Performed by: PODIATRIST

## 2022-05-13 PROCEDURE — 25000003 PHARM REV CODE 250: Performed by: INTERNAL MEDICINE

## 2022-05-13 PROCEDURE — 27000221 HC OXYGEN, UP TO 24 HOURS

## 2022-05-13 PROCEDURE — 87206 SMEAR FLUORESCENT/ACID STAI: CPT | Performed by: PODIATRIST

## 2022-05-13 PROCEDURE — 25000003 PHARM REV CODE 250: Performed by: NURSE ANESTHETIST, CERTIFIED REGISTERED

## 2022-05-13 PROCEDURE — 28003 PR DEEP DISSEC FOOT INFEC,MULTIPLE: ICD-10-PCS | Mod: LT,NTX,, | Performed by: PODIATRIST

## 2022-05-13 PROCEDURE — 71000033 HC RECOVERY, INTIAL HOUR: Performed by: PODIATRIST

## 2022-05-13 PROCEDURE — 94799 UNLISTED PULMONARY SVC/PX: CPT

## 2022-05-13 PROCEDURE — 87116 MYCOBACTERIA CULTURE: CPT | Performed by: PODIATRIST

## 2022-05-13 PROCEDURE — 27000671 HC TUBING MICROBORE EXT: Performed by: ANESTHESIOLOGY

## 2022-05-13 PROCEDURE — 28003 TREATMENT OF FOOT INFECTION: CPT | Mod: LT,NTX,, | Performed by: PODIATRIST

## 2022-05-13 PROCEDURE — 27202107 HC XP QUATRO SENSOR: Performed by: ANESTHESIOLOGY

## 2022-05-13 PROCEDURE — 63600175 PHARM REV CODE 636 W HCPCS: Performed by: NURSE ANESTHETIST, CERTIFIED REGISTERED

## 2022-05-13 PROCEDURE — 87118 MYCOBACTERIC IDENTIFICATION: CPT | Performed by: PODIATRIST

## 2022-05-13 PROCEDURE — 63600175 PHARM REV CODE 636 W HCPCS: Performed by: NURSE PRACTITIONER

## 2022-05-13 PROCEDURE — 82962 GLUCOSE BLOOD TEST: CPT

## 2022-05-13 PROCEDURE — 71000039 HC RECOVERY, EACH ADD'L HOUR: Performed by: PODIATRIST

## 2022-05-13 PROCEDURE — 12000002 HC ACUTE/MED SURGE SEMI-PRIVATE ROOM

## 2022-05-13 PROCEDURE — 99900035 HC TECH TIME PER 15 MIN (STAT)

## 2022-05-13 PROCEDURE — 27000673 HC TUBING BLOOD Y: Performed by: ANESTHESIOLOGY

## 2022-05-13 PROCEDURE — 87075 CULTR BACTERIA EXCEPT BLOOD: CPT | Performed by: PODIATRIST

## 2022-05-13 PROCEDURE — 25000003 PHARM REV CODE 250

## 2022-05-13 PROCEDURE — 37000008 HC ANESTHESIA 1ST 15 MINUTES: Performed by: PODIATRIST

## 2022-05-13 PROCEDURE — 87205 SMEAR GRAM STAIN: CPT | Performed by: PODIATRIST

## 2022-05-13 PROCEDURE — 94761 N-INVAS EAR/PLS OXIMETRY MLT: CPT

## 2022-05-13 PROCEDURE — 80048 BASIC METABOLIC PNL TOTAL CA: CPT | Performed by: NURSE PRACTITIONER

## 2022-05-13 PROCEDURE — 25000003 PHARM REV CODE 250: Performed by: NURSE PRACTITIONER

## 2022-05-13 RX ORDER — PROMETHAZINE HYDROCHLORIDE 25 MG/1
25 TABLET ORAL EVERY 6 HOURS PRN
Status: DISCONTINUED | OUTPATIENT
Start: 2022-05-13 | End: 2022-05-19 | Stop reason: HOSPADM

## 2022-05-13 RX ORDER — ONDANSETRON 2 MG/ML
INJECTION INTRAMUSCULAR; INTRAVENOUS
Status: DISCONTINUED | OUTPATIENT
Start: 2022-05-13 | End: 2022-05-13

## 2022-05-13 RX ORDER — DIPHENHYDRAMINE HYDROCHLORIDE 50 MG/ML
12.5 INJECTION INTRAMUSCULAR; INTRAVENOUS
Status: DISCONTINUED | OUTPATIENT
Start: 2022-05-13 | End: 2022-05-13 | Stop reason: HOSPADM

## 2022-05-13 RX ORDER — MIDAZOLAM HYDROCHLORIDE 1 MG/ML
INJECTION INTRAMUSCULAR; INTRAVENOUS
Status: DISCONTINUED | OUTPATIENT
Start: 2022-05-13 | End: 2022-05-13

## 2022-05-13 RX ORDER — OXYCODONE HYDROCHLORIDE 5 MG/1
5 TABLET ORAL
Status: DISCONTINUED | OUTPATIENT
Start: 2022-05-13 | End: 2022-05-13 | Stop reason: HOSPADM

## 2022-05-13 RX ORDER — HYDROMORPHONE HYDROCHLORIDE 1 MG/ML
0.2 INJECTION, SOLUTION INTRAMUSCULAR; INTRAVENOUS; SUBCUTANEOUS EVERY 5 MIN PRN
Status: DISCONTINUED | OUTPATIENT
Start: 2022-05-13 | End: 2022-05-13 | Stop reason: HOSPADM

## 2022-05-13 RX ORDER — HYDROCODONE BITARTRATE AND ACETAMINOPHEN 5; 325 MG/1; MG/1
1 TABLET ORAL EVERY 4 HOURS PRN
Status: DISCONTINUED | OUTPATIENT
Start: 2022-05-13 | End: 2022-05-17

## 2022-05-13 RX ORDER — ROCURONIUM BROMIDE 10 MG/ML
INJECTION, SOLUTION INTRAVENOUS
Status: DISCONTINUED | OUTPATIENT
Start: 2022-05-13 | End: 2022-05-13

## 2022-05-13 RX ORDER — FENTANYL CITRATE 50 UG/ML
INJECTION, SOLUTION INTRAMUSCULAR; INTRAVENOUS
Status: DISCONTINUED | OUTPATIENT
Start: 2022-05-13 | End: 2022-05-13

## 2022-05-13 RX ORDER — ACETAMINOPHEN 10 MG/ML
INJECTION, SOLUTION INTRAVENOUS
Status: DISCONTINUED | OUTPATIENT
Start: 2022-05-13 | End: 2022-05-13

## 2022-05-13 RX ORDER — FAMOTIDINE 10 MG/ML
INJECTION INTRAVENOUS
Status: DISCONTINUED | OUTPATIENT
Start: 2022-05-13 | End: 2022-05-13

## 2022-05-13 RX ORDER — LIDOCAINE HYDROCHLORIDE 20 MG/ML
JELLY TOPICAL
Status: DISCONTINUED | OUTPATIENT
Start: 2022-05-13 | End: 2022-05-13

## 2022-05-13 RX ORDER — HYDROCODONE BITARTRATE AND ACETAMINOPHEN 10; 325 MG/1; MG/1
1 TABLET ORAL EVERY 4 HOURS PRN
Status: DISCONTINUED | OUTPATIENT
Start: 2022-05-13 | End: 2022-05-17

## 2022-05-13 RX ORDER — ONDANSETRON 2 MG/ML
4 INJECTION INTRAMUSCULAR; INTRAVENOUS DAILY PRN
Status: DISCONTINUED | OUTPATIENT
Start: 2022-05-13 | End: 2022-05-13 | Stop reason: HOSPADM

## 2022-05-13 RX ORDER — LIDOCAINE HCL/PF 100 MG/5ML
SYRINGE (ML) INTRAVENOUS
Status: DISCONTINUED | OUTPATIENT
Start: 2022-05-13 | End: 2022-05-13

## 2022-05-13 RX ORDER — SUCCINYLCHOLINE CHLORIDE 20 MG/ML
INJECTION INTRAMUSCULAR; INTRAVENOUS
Status: DISCONTINUED | OUTPATIENT
Start: 2022-05-13 | End: 2022-05-13

## 2022-05-13 RX ORDER — PROPOFOL 10 MG/ML
VIAL (ML) INTRAVENOUS
Status: DISCONTINUED | OUTPATIENT
Start: 2022-05-13 | End: 2022-05-13

## 2022-05-13 RX ORDER — HYDROCODONE BITARTRATE AND ACETAMINOPHEN 5; 325 MG/1; MG/1
1 TABLET ORAL EVERY 8 HOURS PRN
Status: DISCONTINUED | OUTPATIENT
Start: 2022-05-13 | End: 2022-05-19 | Stop reason: HOSPADM

## 2022-05-13 RX ORDER — MEPERIDINE HYDROCHLORIDE 50 MG/ML
12.5 INJECTION INTRAMUSCULAR; INTRAVENOUS; SUBCUTANEOUS EVERY 10 MIN PRN
Status: DISCONTINUED | OUTPATIENT
Start: 2022-05-13 | End: 2022-05-13 | Stop reason: HOSPADM

## 2022-05-13 RX ORDER — ONDANSETRON 4 MG/1
8 TABLET, ORALLY DISINTEGRATING ORAL EVERY 8 HOURS PRN
Status: DISCONTINUED | OUTPATIENT
Start: 2022-05-13 | End: 2022-05-19 | Stop reason: HOSPADM

## 2022-05-13 RX ORDER — SODIUM CHLORIDE 0.9 % (FLUSH) 0.9 %
10 SYRINGE (ML) INJECTION
Status: DISCONTINUED | OUTPATIENT
Start: 2022-05-13 | End: 2022-05-19 | Stop reason: HOSPADM

## 2022-05-13 RX ADMIN — PROPOFOL 80 MG: 10 INJECTION, EMULSION INTRAVENOUS at 07:05

## 2022-05-13 RX ADMIN — CETIRIZINE HYDROCHLORIDE 10 MG: 10 TABLET, FILM COATED ORAL at 01:05

## 2022-05-13 RX ADMIN — FAMOTIDINE 20 MG: 10 INJECTION, SOLUTION INTRAVENOUS at 07:05

## 2022-05-13 RX ADMIN — Medication 120 MG: at 07:05

## 2022-05-13 RX ADMIN — PIPERACILLIN AND TAZOBACTAM 3.38 G: 3; .375 INJECTION, POWDER, LYOPHILIZED, FOR SOLUTION INTRAVENOUS; PARENTERAL at 12:05

## 2022-05-13 RX ADMIN — CALCIUM ACETATE 667 MG: 667 CAPSULE ORAL at 01:05

## 2022-05-13 RX ADMIN — EPOETIN ALFA-EPBX 9000 UNITS: 10000 INJECTION, SOLUTION INTRAVENOUS; SUBCUTANEOUS at 01:05

## 2022-05-13 RX ADMIN — HYDROCODONE BITARTRATE AND ACETAMINOPHEN 1 TABLET: 5; 325 TABLET ORAL at 05:05

## 2022-05-13 RX ADMIN — GLYCOPYRROLATE 0.2 MG: 0.2 INJECTION, SOLUTION INTRAMUSCULAR; INTRAVITREAL at 09:05

## 2022-05-13 RX ADMIN — CALCIUM ACETATE 667 MG: 667 CAPSULE ORAL at 05:05

## 2022-05-13 RX ADMIN — ROCURONIUM BROMIDE 10 MG: 10 INJECTION, SOLUTION INTRAVENOUS at 07:05

## 2022-05-13 RX ADMIN — POLYETHYLENE GLYCOL 3350 17 G: 17 POWDER, FOR SOLUTION ORAL at 02:05

## 2022-05-13 RX ADMIN — SODIUM CHLORIDE: 0.9 INJECTION, SOLUTION INTRAVENOUS at 07:05

## 2022-05-13 RX ADMIN — THERA TABS 1 TABLET: TAB at 01:05

## 2022-05-13 RX ADMIN — LIDOCAINE HYDROCHLORIDE 75 MG: 20 INJECTION, SOLUTION INTRAVENOUS at 07:05

## 2022-05-13 RX ADMIN — INSULIN DETEMIR 20 UNITS: 100 INJECTION, SOLUTION SUBCUTANEOUS at 09:05

## 2022-05-13 RX ADMIN — ONDANSETRON 4 MG: 2 INJECTION INTRAMUSCULAR; INTRAVENOUS at 07:05

## 2022-05-13 RX ADMIN — ACETAMINOPHEN 1000 MG: 10 INJECTION, SOLUTION INTRAVENOUS at 07:05

## 2022-05-13 RX ADMIN — AZELASTINE HYDROCHLORIDE 137 MCG: 137 SPRAY, METERED NASAL at 09:05

## 2022-05-13 RX ADMIN — OXYCODONE HYDROCHLORIDE AND ACETAMINOPHEN 500 MG: 500 TABLET ORAL at 01:05

## 2022-05-13 RX ADMIN — LIDOCAINE HYDROCHLORIDE 3 ML: 20 JELLY TOPICAL at 07:05

## 2022-05-13 RX ADMIN — FENTANYL CITRATE 50 MCG: 50 INJECTION INTRAMUSCULAR; INTRAVENOUS at 07:05

## 2022-05-13 RX ADMIN — INSULIN ASPART 1 UNITS: 100 INJECTION, SOLUTION INTRAVENOUS; SUBCUTANEOUS at 09:05

## 2022-05-13 RX ADMIN — ASPIRIN 81 MG: 81 TABLET, COATED ORAL at 01:05

## 2022-05-13 RX ADMIN — MIDAZOLAM HYDROCHLORIDE 2 MG: 1 INJECTION, SOLUTION INTRAMUSCULAR; INTRAVENOUS at 07:05

## 2022-05-13 NOTE — PROGRESS NOTES
05/13/22 1349   Handoff Report   Received From [facility]   Given To Jun   Vital Signs   Temp 98.8 °F (37.1 °C)   Pulse (!) 56   Resp 16   SpO2 98 %   BP (!) 154/63   Post-Hemodialysis Assessment   Rinseback Volume (mL) 250 mL   Blood Volume Processed (Liters) 62.4 L   Dialyzer Clearance Heavily streaked   Duration of Treatment 180 minutes   Additional Fluid Intake (mL) 500 mL   Total UF (mL) 2500 mL   Net Fluid Removal 2000   Patient Response to Treatment tolerated well   Arterial bleeding stop time (min) 5 min   Venous bleeding stop time (min) 5 min   Post-Hemodialysis Comments tx completed without incident

## 2022-05-13 NOTE — ASSESSMENT & PLAN NOTE
S/p Incision/drainage/debridement of  Left foot deep tissue abscess below fascia muscle and tendon  Again had Incision and drainage below fascia of multiple abscesses left foot on 05/13

## 2022-05-13 NOTE — PT/OT/SLP PROGRESS
Occupational Therapy      Patient Name:  Leanna García   MRN:  [id number]    Patient not seen today secondary to  (Off floor for procedure: I&D).    5/13/2022

## 2022-05-13 NOTE — ASSESSMENT & PLAN NOTE
Wound care  MRI r/o osteomyelitis  WBC scan showed medial left foot as source of infection  S/p Incision/drainage/debridement of  Left foot deep tissue abscess below fascia muscle and tendon  Again had Incision and drainage below fascia of multiple abscesses left foot on 05/13

## 2022-05-13 NOTE — ANESTHESIA PREPROCEDURE EVALUATION
05/13/2022  Leanna García is a 56 y.o., female.      Pre-op Assessment    I have reviewed the Patient Summary Reports.     I have reviewed the Nursing Notes. I have reviewed the NPO Status.   I have reviewed the Medications.     Review of Systems  Anesthesia Hx:  Denies Family Hx of Anesthesia complications.   Denies Personal Hx of Anesthesia complications.   Social:  Non-Smoker, No Alcohol Use    Hematology/Oncology:     Oncology Normal    -- Anemia:   EENT/Dental:EENT/Dental Normal   Cardiovascular:   Hypertension Dysrhythmias atrial fibrillation hyperlipidemia 05/06/2022 echo shows 65% EF, normal diastolic function, mild MR   Pulmonary:  Pulmonary Normal    Renal/:   Chronic Renal Disease ( last dialysis 05/11/2022), ESRD, Dialysis    Hepatic/GI:   Recent food poisoning, resolved   Musculoskeletal:   Arthritis     Neurological:   TIA, Occasional left upper extremity tremor  Peripheral Neuropathy ( feet)    Endocrine:   Diabetes (Glucose 153 this morning), poorly controlled, using insulin    Psych:   anxiety          Physical Exam  General: Well nourished and Alert    Airway:  Mallampati: III / II  Mouth Opening: Normal  TM Distance: Normal  Tongue: Normal  Neck ROM: Normal ROM    Dental:  Intact    Chest/Lungs:  Clear to auscultation, Normal Respiratory Rate    Heart:  Rate: Normal  Rhythm: Regular Rhythm  Sounds: Normal  Murmur: Systolic;        Anesthesia Plan  Type of Anesthesia, risks & benefits discussed:    Anesthesia Type: Gen ETT  Intra-op Monitoring Plan: Standard ASA Monitors  Post Op Pain Control Plan: multimodal analgesia  Induction:  IV  Airway Plan: Direct, Post-Induction  Informed Consent: Informed consent signed with the Patient and all parties understand the risks and agree with anesthesia plan.  All questions answered. Patient consented to blood products? Yes  ASA Score:  3  Anesthesia Plan Notes: No Decadron   Multimodal analgesia:  IV acetaminophen   Antiemetics:  Zofran, Pepcid    Ready For Surgery From Anesthesia Perspective.     .

## 2022-05-13 NOTE — PT/OT/SLP PROGRESS
Physical Therapy      Patient Name:  Leanna García   MRN:  [id number]    Patient not seen today secondary to Off the floor for procedure/surgery (debridement). Will follow-up 5/14/22.

## 2022-05-13 NOTE — PLAN OF CARE
Pt compliant with POC. RR even and unlabored. I & D complete today .No signs of distress noted. Will continue to monitor.     Problem: Adult Inpatient Plan of Care  Goal: Plan of Care Review  Outcome: Ongoing, Progressing  Goal: Patient-Specific Goal (Individualized)  Outcome: Ongoing, Progressing  Goal: Absence of Hospital-Acquired Illness or Injury  Outcome: Ongoing, Progressing  Goal: Optimal Comfort and Wellbeing  Outcome: Ongoing, Progressing  Goal: Readiness for Transition of Care  Outcome: Ongoing, Progressing     Problem: Diabetes Comorbidity  Goal: Blood Glucose Level Within Targeted Range  Outcome: Ongoing, Progressing     Problem: Adjustment to Illness (Sepsis/Septic Shock)  Goal: Optimal Coping  Outcome: Ongoing, Progressing

## 2022-05-13 NOTE — ANESTHESIA POSTPROCEDURE EVALUATION
Anesthesia Post Evaluation    Patient: Leanna García    Procedure(s) Performed: Procedure(s) (LRB):  INCISION AND DRAINAGE, FOOT (Left)    Final Anesthesia Type: general      Patient location during evaluation: PACU  Patient participation: Yes- Able to Participate  Level of consciousness: awake and alert  Post-procedure vital signs: reviewed and stable  Pain management: adequate  Airway patency: patent    PONV status at discharge: No PONV  Anesthetic complications: no      Cardiovascular status: stable  Respiratory status: unassisted and spontaneous ventilation  Hydration status: euvolemic  Follow-up not needed.          Vitals Value Taken Time   /60 05/13/22 0900   Temp 37.1 °C (98.8 °F) 05/13/22 0845   Pulse 47 05/13/22 0909   Resp 14 05/13/22 0845   SpO2 99 % 05/13/22 0909   Vitals shown include unvalidated device data.      No case tracking events are documented in the log.      Pain/Peña Score: Pain Rating Prior to Med Admin: 6 (5/12/2022  4:09 PM)  Peña Score: 8 (5/13/2022  8:01 AM)

## 2022-05-13 NOTE — ASSESSMENT & PLAN NOTE
Gram positive rods on BC X 1 :Bacillus cereus   Had enteritis/Food poisoning which has been resolved(ate chinese food before onset of symptoms)  Repeat Blood cultures so far normal  S/p Incision/drainage/debridement of  Left foot deep tissue abscess below fascia muscle and tendon  Again had Incision and drainage below fascia of multiple abscesses left foot on 05/13  Continue Daptomycin 500 mg IV q48h   Continue Zosyn 4.5 g IV q.12 until 5/13, then stop, per ID MD  Medically stable to go to SNF after weekend

## 2022-05-13 NOTE — ANESTHESIA PROCEDURE NOTES
Intubation    Date/Time: 5/13/2022 7:19 AM  Performed by: Reza Vega CRNA  Authorized by: Arslan Torerz MD     Intubation:     Induction:  Intravenous    Intubated:  Postinduction    Mask Ventilation:  Easy mask    Attempts:  1    Attempted By:  CRNA    Method of Intubation:  Video laryngoscopy    Blade:  Dickson 3    Laryngeal View Grade: Grade I - full view of cords      Difficult Airway Encountered?: No      Complications:  None    Airway Device:  Oral endotracheal tube    Airway Device Size:  7.5    Style/Cuff Inflation:  Cuffed (inflated to minimal occlusive pressure)    Tube secured:  21    Secured at:  The lips    Placement Verified By:  Capnometry    Complicating Factors:  None    Findings Post-Intubation:  BS equal bilateral and atraumatic/condition of teeth unchanged

## 2022-05-13 NOTE — SUBJECTIVE & OBJECTIVE
Interval History:     Review of Systems   Unable to perform ROS: Mental status change   Objective:     Vital Signs (Most Recent):  Temp: 98.8 °F (37.1 °C) (05/13/22 1349)  Pulse: (!) 56 (05/13/22 1349)  Resp: 16 (05/13/22 1349)  BP: (!) 154/63 (05/13/22 1349)  SpO2: 98 % (05/13/22 1349)   Vital Signs (24h Range):  Temp:  [97.7 °F (36.5 °C)-98.8 °F (37.1 °C)] 98.8 °F (37.1 °C)  Pulse:  [44-61] 56  Resp:  [12-20] 16  SpO2:  [92 %-100 %] 98 %  BP: (111-180)/(48-76) 154/63     Weight: 83 kg (182 lb 15.7 oz)  Body mass index is 28.66 kg/m².    Intake/Output Summary (Last 24 hours) at 5/13/2022 1628  Last data filed at 5/13/2022 1349  Gross per 24 hour   Intake 1360 ml   Output 2500 ml   Net -1140 ml      Physical Exam  Vitals and nursing note reviewed.   Constitutional:       General: She is not in acute distress.     Comments: Drowsy but alert and able to answer questions   HENT:      Head: Atraumatic.      Right Ear: External ear normal.      Left Ear: External ear normal.      Nose: Nose normal.      Mouth/Throat:      Mouth: Mucous membranes are moist.   Eyes:      General: No scleral icterus.  Cardiovascular:      Rate and Rhythm: Normal rate.   Pulmonary:      Effort: Pulmonary effort is normal.   Abdominal:      General: There is no distension.   Musculoskeletal:         General: Normal range of motion.      Cervical back: Normal range of motion.      Comments: Bandage intact on foot   Skin:     General: Skin is warm.   Neurological:      General: No focal deficit present.      Mental Status: She is alert.   Psychiatric:         Behavior: Behavior normal.       Significant Labs: All pertinent labs within the past 24 hours have been reviewed.  CBC:   Recent Labs   Lab 05/12/22  0648 05/13/22  0605   WBC 23.45* 24.10*   HGB 10.7* 9.5*   HCT 34.5* 29.8*   * 470*     CMP:   Recent Labs   Lab 05/12/22  0648 05/13/22  0605   * 131*   K 4.4 4.6   CL 94* 92*   CO2 25 25   * 154*   BUN 72* 104*    CREATININE 6.5* 8.0*   CALCIUM 9.7 10.1   ANIONGAP 13 14   EGFRNONAA 6.6* 5.1*       Significant Imaging: I have reviewed all pertinent imaging results/findings within the past 24 hours.

## 2022-05-13 NOTE — PLAN OF CARE
CM [facility] with [facility]. Dialysis arranged for [facility] in [place] while in SNF at [facility] for T,Th and Sat at 11 am. [facility] confirmed they do not need Hep B panel. Patient is not medically clear for discharge per Dr Stacy.       05/13/22 1131   Discharge Reassessment   Assessment Type Discharge Planning Reassessment   Did the patient's condition or plan change since previous assessment? Yes   Discharge Plan discussed with: Patient   Discharge Plan A Skilled Nursing Facility   Discharge Plan B Skilled Nursing Facility   DME Needed Upon Discharge  other (see comments)  (TBD)   Why the patient remains in the hospital Requires continued medical care   Post-Acute Status   Post-Acute Authorization Placement   Post-Acute Placement Status Set-up Complete/Auth obtained   Discharge Delays (!) Change in Medical Condition

## 2022-05-13 NOTE — TRANSFER OF CARE
"Anesthesia Transfer of Care Note    Patient: Leanna García    Procedure(s) Performed: Procedure(s) (LRB):  INCISION AND DRAINAGE, FOOT (Left)    Patient location: PACU    Anesthesia Type: general    Transport from OR: Transported from OR on room air with adequate spontaneous ventilation    Post pain: adequate analgesia    Post assessment: no apparent anesthetic complications    Post vital signs: stable    Level of consciousness: awake and alert    Nausea/Vomiting: no nausea/vomiting    Complications: none    Transfer of care protocol was followed      Last vitals:   Visit Vitals  BP (!) 134/58   Pulse (!) 52   Temp 36.8 °C (98.2 °F) (Oral)   Resp 17   Ht 5' 7" (1.702 m)   Wt 83 kg (182 lb 15.7 oz)   SpO2 (!) 94%   BMI 28.66 kg/m²     "

## 2022-05-13 NOTE — CARE UPDATE
05/13/22 1146   Patient Assessment/Suction   Level of Consciousness (AVPU) responds to voice   Respiratory Effort Normal   Expansion/Accessory Muscles/Retractions no use of accessory muscles   All Lung Fields Breath Sounds diminished   Rhythm/Pattern, Respiratory no shortness of breath reported   Cough Frequency infrequent   Cough Type nonproductive   PRE-TX-O2   O2 Device (Oxygen Therapy) nasal cannula  (PLACED ON RA)   $ Is the patient on Low Flow Oxygen? Yes   Flow (L/min) 2   SpO2 99 %   Pulse Oximetry Type Intermittent   $ Pulse Oximetry - Multiple Charge Pulse Oximetry - Multiple   Pulse (!) 46  (NURSE AWARE)   Education   $ Education Other (see comment);15 min  (SATS)   Respiratory Evaluation   $ Care Plan Tech Time 15 min   $ Eval/Re-eval Charges Re-evaluation

## 2022-05-13 NOTE — PROGRESS NOTES
[facility] Medicine  Progress Note    Patient Name: Leanna García  MRN: [id number]  Patient Class: IP- Inpatient   Admission Date: 5/4/2022  Length of Stay: 9 days  Attending Physician: Umsan Stacy MD  Primary Care Provider: Stefano Lopez MD        Subjective:     Principal Problem:Bacteremia        HPI:  Ms. García is a 56-year-old female with a past medical history of ESRD on HD Monday Wednesday Friday, hypertension, IDDM2, PAF, and chronic cough who presents today with complaints of fever up to 103. It is severe.  It is associated with cough, posttussive vomiting, fatigue, sinus congestion, weakness, and a left foot ulcer.  She denies chest pain, diarrhea, dizziness, loss of consciousness.  In the ED she was noted to have a large left foot ulceration at the plantar surface and on the side of the foot.  CT of abdomen pelvis also reveals possible enteritis and bilateral perinephric fat stranding.  WBCs 21 K, troponin is 0.4 in the setting of ESRD.  She did not go to dialysis today she felt too bad.  Glucose is 446, anion gap 20, bicarb 24.       Overview/Hospital Course:  05/05  Had HD today  MRI r/o osteomyelitis  Afebrile now    05/06  Had febrile episodes overnight  Blood cultures send to [facility]  Had dialysis today  Awaiting WBC scan     05/07  WBC scan showed medial left foot infection  C/o extreme fatigue/weakness    05/08  Pt having on and off febrile episodes at night  Pt today had bedside debridement of L foot abscess     05/09  Pt had HD today  Febrile episodes eprsists  Receive done unit of blood     05/10  Overall much better except for constipation  Awaiting insurance approval for LTAC placement   Otherwise stable     05/11  LTAC stay denied by insurance  Insurance approved SNF placement  Medically stable     05/12  Pt still having febrile episodes  WBC levels high  Pt c/o pain on R foot area    05/13  Pt today had Incision and drainage below fascia of multiple abscesses  left foot  Postoperatively drowsy   Had HD today       Interval History:     Review of Systems   Unable to perform ROS: Mental status change   Objective:     Vital Signs (Most Recent):  Temp: 98.8 °F (37.1 °C) (05/13/22 1349)  Pulse: (!) 56 (05/13/22 1349)  Resp: 16 (05/13/22 1349)  BP: (!) 154/63 (05/13/22 1349)  SpO2: 98 % (05/13/22 1349)   Vital Signs (24h Range):  Temp:  [97.7 °F (36.5 °C)-98.8 °F (37.1 °C)] 98.8 °F (37.1 °C)  Pulse:  [44-61] 56  Resp:  [12-20] 16  SpO2:  [92 %-100 %] 98 %  BP: (111-180)/(48-76) 154/63     Weight: 83 kg (182 lb 15.7 oz)  Body mass index is 28.66 kg/m².    Intake/Output Summary (Last 24 hours) at 5/13/2022 1628  Last data filed at 5/13/2022 1349  Gross per 24 hour   Intake 1360 ml   Output 2500 ml   Net -1140 ml      Physical Exam  Vitals and nursing note reviewed.   Constitutional:       General: She is not in acute distress.     Comments: Drowsy but alert and able to answer questions   HENT:      Head: Atraumatic.      Right Ear: External ear normal.      Left Ear: External ear normal.      Nose: Nose normal.      Mouth/Throat:      Mouth: Mucous membranes are moist.   Eyes:      General: No scleral icterus.  Cardiovascular:      Rate and Rhythm: Normal rate.   Pulmonary:      Effort: Pulmonary effort is normal.   Abdominal:      General: There is no distension.   Musculoskeletal:         General: Normal range of motion.      Cervical back: Normal range of motion.      Comments: Bandage intact on foot   Skin:     General: Skin is warm.   Neurological:      General: No focal deficit present.      Mental Status: She is alert.   Psychiatric:         Behavior: Behavior normal.       Significant Labs: All pertinent labs within the past 24 hours have been reviewed.  CBC:   Recent Labs   Lab 05/12/22  0648 05/13/22  0605   WBC 23.45* 24.10*   HGB 10.7* 9.5*   HCT 34.5* 29.8*   * 470*     CMP:   Recent Labs   Lab 05/12/22  0648 05/13/22  0605   * 131*   K 4.4 4.6   CL 94*  92*   CO2 25 25   * 154*   BUN 72* 104*   CREATININE 6.5* 8.0*   CALCIUM 9.7 10.1   ANIONGAP 13 14   EGFRNONAA 6.6* 5.1*       Significant Imaging: I have reviewed all pertinent imaging results/findings within the past 24 hours.      Assessment/Plan:      * Bacteremia  Gram positive rods on BC X 1 :Bacillus cereus   Had enteritis/Food poisoning which has been resolved(ate chinese food before onset of symptoms)  Repeat Blood cultures so far normal  S/p Incision/drainage/debridement of  Left foot deep tissue abscess below fascia muscle and tendon  Again had Incision and drainage below fascia of multiple abscesses left foot on 05/13  Continue Daptomycin 500 mg IV q48h   Continue Zosyn 4.5 g IV q.12 until 5/13, then stop, per ID MD  Medically stable to go to SNF after weekend     Sepsis  Bacillus cereus bacteremia along with L foot abscess       Abscess of left foot  S/p Incision/drainage/debridement of  Left foot deep tissue abscess below fascia muscle and tendon  Again had Incision and drainage below fascia of multiple abscesses left foot on 05/13      Diabetic ulcer of left midfoot  Wound care  MRI r/o osteomyelitis  WBC scan showed medial left foot as source of infection  S/p Incision/drainage/debridement of  Left foot deep tissue abscess below fascia muscle and tendon  Again had Incision and drainage below fascia of multiple abscesses left foot on 05/13    Discharge planning issues  LTAC stay denied by insurance  Awaiting SNF placement after weekend      Chronic anemia  pRBC if needed along with Dialysis sessions  Received one unit of blood on 05/09      Diabetic foot infection  As above         Foot infection  As above       Cough  Symptomatic management      Acute bronchitis  Symptomatic management      Elevated troponin  Likely demand ischemia in the setting of ESRD        Hypokalemia  Stable     Enteritis  Resolved     Type 2 diabetes mellitus with kidney complication, with long-term current use of  insulin  Maintain present regime       ESRD (end stage renal disease)  HD sessions as scheduled       VTE Risk Mitigation (From admission, onward)         Ordered     heparin (porcine) injection 5,000 Units  As needed (PRN)         05/10/22 1541     IP VTE HIGH RISK PATIENT  Once         05/05/22 0003     Place sequential compression device  Until discontinued         05/05/22 0003                Discharge Planning   ERI: 5/16/2022     Code Status: Full Code   Is the patient medically ready for discharge?: No    Reason for patient still in hospital (select all that apply): Treatment and Pending disposition  Discharge Plan A: Skilled Nursing Facility   Discharge Delays: (!) Change in Medical Condition              Usman Stacy MD  Department of Hospital Medicine   [facility]

## 2022-05-13 NOTE — PROGRESS NOTES
INPATIENT NEPHROLOGY Progress Note  [facility] NEPHROLOGY    Leanna García  05/13/2022    Reason for consultation:    ESRD    Chief Complaint:   Chief Complaint   Patient presents with    Fever          History of Present Illness:    Per H and P    Ms. García is a 56-year-old female with a past medical history of ESRD on HD Monday Wednesday Friday, hypertension, IDDM2, PAF, and chronic cough who presents today with complaints of fever up to 103. It is severe.  It is associated with cough, posttussive vomiting, fatigue, sinus congestion, weakness, and a left foot ulcer.  She denies chest pain, diarrhea, dizziness, loss of consciousness.  In the ED she was noted to have a large left foot ulceration at the plantar surface and on the side of the foot.  CT of abdomen pelvis also reveals possible enteritis and bilateral perinephric fat stranding.  WBCs 21 K, troponin is 0.4 in the setting of ESRD.  She did not go to dialysis today she felt too bad.  Glucose is 446, anion gap 20, bicarb 24.     5/5  C/o foot pain.  Mild dyspnea.  No vomiting, chest pain, urinary or bowel complaints.  Weak  5/6  Currently getting wound vac placed.  Has foot pain. No respiratory distress  5/7 still spiking fevers.  Foot pain.  Stopped hd early yesterday  5/8  Tired today.  No vomiting or sob.  Tmax 101.9 at 1900 yesterday  5/9 wound vac today  5/10 no issues with HD yest- got 1u of blood- net UF 2.6L- waiting for wound vac- gave ok with PICC  5/11 febrile, BP stable, on 2L NC- seen on HD- no complaints; has wound vac; looking to place at [facility]  5/12 plan for [facility]  5/13 went for I&D of L foot abscesses     Plan of Care:     ESRD on HD MWF  --continue dialysis per routine    Hypertension  --low salt diet 2g/day  --uf with hd    Hyponatremia  --fluid restrict 1.5L/day  --140 Na dialysate    SHPT  --continue binder with meals     Anemia of CKD  --Hb is better after transfusion on 5/9- stabilizing  --continue FERMÍN with HD    Diab  Foot Ulcer- MRSA  --podiatry following, getting procedures PRN  --wound vac in place  --dose antbx for CrCl< 10/HD    Thank you for allowing us to participate in this patient's care. We will continue to follow.    Vital Signs:  Temp Readings from Last 3 Encounters:   05/13/22 98.8 °F (37.1 °C) (Oral)   04/26/22 98.2 °F (36.8 °C)   03/10/22 97.3 °F (36.3 °C) (Tympanic)       Pulse Readings from Last 3 Encounters:   05/13/22 (!) 50   04/26/22 66   04/05/22 75       BP Readings from Last 3 Encounters:   05/13/22 (!) 152/65   04/28/22 (!) 180/82   04/26/22 (!) 174/66       Weight:  Wt Readings from Last 3 Encounters:   05/11/22 83 kg (182 lb 15.7 oz)   05/06/22 86.6 kg (191 lb)   04/28/22 94 kg (207 lb 5.5 oz)       Medications:  No current facility-administered medications on file prior to encounter.     Current Outpatient Medications on File Prior to Encounter   Medication Sig Dispense Refill    ascorbic acid, vitamin C, (VITAMIN C) 500 MG tablet Take 500 mg by mouth once daily.      aspirin (ECOTRIN) 81 MG EC tablet Take 81 mg by mouth once daily.      atorvastatin (LIPITOR) 80 MG tablet Take 1 tablet (80 mg total) by mouth once daily. (Patient taking differently: Take 80 mg by mouth every evening.) 90 tablet 3    blood sugar diagnostic Strp To check BG 4 times daily, to use with insurance preferred meter (Patient taking differently: 1 strip by Misc.(Non-Drug; Combo Route) route 4 (four) times daily. To check BG 4 times daily, to use with insurance preferred meter) 450 strip 3    insulin aspart U-100 (NOVOLOG FLEXPEN U-100 INSULIN) 100 unit/mL (3 mL) InPn pen Inject 5-8 units 3 times per day with food. 1 Box 6    insulin detemir U-100 (LEVEMIR FLEXTOUCH U-100 INSULN) 100 unit/mL (3 mL) InPn pen Inject 15-18 Units into the skin once daily. 1 Box 6    lancets Misc To use to check blood sugar 4 times daily. To use with insurance approved meter. Patient needs the round, purple one (Patient taking differently: 1  "lancet by Misc.(Non-Drug; Combo Route) route 4 (four) times daily. To use to check blood sugar 4 times daily. To use with insurance approved meter. Patient needs the round, purple one) 450 each 3    metoprolol tartrate (LOPRESSOR) 25 MG tablet Take 25 mg by mouth 2 (two) times daily.      minoxidiL (LONITEN) 2.5 MG tablet Take 5 mg by mouth 2 (two) times daily.      multivitamin (THERAGRAN) per tablet Take 1 tablet by mouth once daily.      pen needle, diabetic (BD ULTRA-FINE ROBERT PEN NEEDLE) 32 gauge x 5/32" Ndle To use 4 times per day with insulin injections. (Patient taking differently: 1 pen by Misc.(Non-Drug; Combo Route) route 4 (four) times daily. To use 4 times per day with insulin injections.) 450 each 2    sucroferric oxyhydroxide (VELPHORO) 500 mg Chew Take 500 mg by mouth 3 (three) times daily.      dextrose (GLUCOSE GEL) 40 % gel Take 37.5 mLs (15,000 mg total) by mouth once as needed (hypoglycemia). 37.5 g 4    gabapentin (NEURONTIN) 300 MG capsule Take 300 mg by mouth every evening.      glucagon (BAQSIMI) 3 mg/actuation Spry 3 mg (one actuation) into a single nostril; if no response, may repeat in 15 minutes using a new intranasal device. (Patient taking differently: 3 mg by Left Nostril route as needed. 3 mg (one actuation) into a single nostril; if no response, may repeat in 15 minutes using a new intranasal device.) 2 each 1    [DISCONTINUED] blood-glucose meter (ACCU-CHEK KAYLIE PLUS METER) Misc To use to check blood sugars 4 times a day 1 each 0    [DISCONTINUED] clorazepate (TRANXENE) 3.75 MG Tab Take 7.5 mg by mouth nightly as needed.       [DISCONTINUED] fenofibrate 160 MG Tab Take 1 tablet (160 mg total) by mouth once daily. 90 tablet 3    [DISCONTINUED] lancing device with lancets (ACCU-CHEK SOFT DEV LANCETS) Kit To check blood sugars 4 times per day 400 each 3     Scheduled Meds:   ascorbic acid (vitamin C)  500 mg Oral Daily    aspirin  81 mg Oral Daily    azelastine  1 " "spray Nasal BID    calcium acetate(phosphat bind)  667 mg Oral TID WM    cetirizine  10 mg Oral Every other day    collagenase   Topical (Top) Daily    DAPTOmycin (CUBICIN)  IV  500 mg Intravenous Q48H    epoetin chris-epbx  100 Units/kg Intravenous Every Mon, Wed, Fri    fluticasone propionate  2 spray Each Nostril Daily    insulin detemir U-100  20 Units Subcutaneous QHS    metoprolol tartrate  25 mg Oral BID    multivitamin  1 tablet Oral Daily    piperacillin-tazobactam (ZOSYN) IVPB  3.375 g Intravenous Q12H     Continuous Infusions:  PRN Meds:.sodium chloride, sodium chloride 0.9%, sodium chloride 0.9%, acetaminophen, ALPRAZolam, benzonatate, dextrose 50%, dextrose 50%, diphenhydrAMINE, guaiFENesin-codeine 100-10 mg/5 ml, heparin (porcine), HYDROcodone-acetaminophen, HYDROmorphone, insulin aspart U-100, melatonin, meperidine, naloxone, ondansetron, ondansetron, oxyCODONE, polyethylene glycol, sodium chloride 0.9%, sodium chloride 0.9%, sodium chloride 0.9%, sodium chloride 0.9%    Review of Systems:  Off floor    Physical Exam:    BP (!) 152/65   Pulse (!) 50   Temp 98.8 °F (37.1 °C) (Oral)   Resp 14   Ht 5' 7" (1.702 m)   Wt 83 kg (182 lb 15.7 oz)   SpO2 96%   BMI 28.66 kg/m²     Off floor    Results:  Lab Results   Component Value Date     (L) 05/13/2022    K 4.6 05/13/2022    CL 92 (L) 05/13/2022    CO2 25 05/13/2022     (H) 05/13/2022    CREATININE 8.0 (H) 05/13/2022    CALCIUM 10.1 05/13/2022    ANIONGAP 14 05/13/2022    ESTGFRAFRICA 5.9 (A) 05/13/2022    EGFRNONAA 5.1 (A) 05/13/2022       Lab Results   Component Value Date    CALCIUM 10.1 05/13/2022    PHOS 3.3 05/10/2022       Recent Labs   Lab 05/13/22  0605   WBC 24.10*   RBC 3.40*   HGB 9.5*   HCT 29.8*   *   MCV 88   MCH 27.9   MCHC 31.9*        Imaging Results          X-Ray Foot Complete Left (Final result)  Result time 05/05/22 06:12:52    Final result by Denise Estrada MD (05/05/22 06:12:52)              "    Narrative:    Three views of the left foot are compared to prior study dated 12/3/2021    Clinical history is infection    There is osteopenia. There has been prior amputation of the fourth digit at the base of the proximal phalanx.    There are moderate degenerative changes of the midfoot and hindfoot with joint space narrowing and spurring.. There are no fractures, dislocations or osteolysis. There is a subcutaneous ulceration the mid plantar soft tissues. There is moderate peripheral vascular calcification.    IMPRESSION: Prior amputation of fourth digit without radiographic evidence for osteomyelitis.    Moderate degenerative changes of the foot    16mm soft tissue ulceration in the mid plantar medial soft tissues    Electronically signed by:  Denise Estrada MD  5/5/2022 6:12 AM CDT Workstation: [id number]                             CT Abdomen Pelvis  Without Contrast (Final result)  Result time 05/04/22 21:53:02    Final result by Ernesto Lino MD (05/04/22 21:53:02)                 Narrative:    EXAM: CT ABDOMEN PELVIS WITHOUT CONTRAST    RadLex: CT ABDOMEN PELVIS WITHOUT IV CONTRAST    HISTORY: 56 years  Female;  Abdominal pain, acute, nonlocalized;    TECHNIQUE:   Standard CT of the abdomen and pelvis was performed without the use of intravenous contrast media. Lack of intravenous contrast limits evaluation of soft tissue structures in this study.    CT scans at this facility use dose modulation, iterative reconstruction and/or weight based dosing when appropriate to reduce radiation dose to as low as reasonably achievable.    COMPARISON: None available    FINDING(S):    ABDOMEN:    Lung bases show dependent atelectasis. Coronary artery calcifications are seen. Cardiac size normal. Mild thickening of the distal esophagus with small hiatal hernia.    Small amount of fluid is seen in the stomach. Mild thickening of the stomach lining.    Mild hepatic steatosis. Spleen, pancreas, and both adrenals are  normal.    There is gallbladder distention with multiple gallstones.    Neither kidney shows hydronephrosis. There is moderate bilateral perinephric stranding which appears above expected and may represent inflammation. Correlate with urinalysis to exclude the possibility of pyelonephritis.    There are vascular calcifications involving both kidneys. There may be separate small stones as well. The ureters themselves are decompressed.    Marked atherosclerosis affects the aorta and the major branch vessels. No abdominal lymphadenopathy or free abdominal fluid.    The small intestines contain a small to moderate amount of fluid with scattered air-fluid levels. No transition point to suggest obstruction.    No evidence of appendicitis. Oral contrast is seen in the intestines. There is a mild to moderate stool burden. Scattered colonic diverticulosis.    PELVIS:    Bladder is decompressed. No free fluid within the pelvis. There is an enlarged left external iliac lymph node measuring 1.6 x 0.8 cm on series 3, image 210.    No other groin or pelvis lymphadenopathy. A small left inguinal hernia contains only fat.    Small umbilical hernia contains only fat.    There are degenerative changes in the lower lumbar spine.    IMPRESSION:    1.  Small to moderate amount of fluid in the small intestines with scattered air fluid levels, possibly enteritis  2.  Moderate bilateral perinephric stranding. Correlate with urinalysis to exclude the possibility of pyelonephritis  3.  Gallbladder distention with gallstones    Electronically signed by:  Ernesto Lino MD  5/4/2022 9:53 PM CDT Workstation: [id number]                             X-Ray Chest 1 View (Final result)  Result time 05/05/22 06:10:56    Final result by Denise Estrada MD (05/05/22 06:10:56)                 Narrative:    XR CHEST 1 VIEW    CLINICAL HISTORY:  56 years Female pain    COMPARISON: 5/19/2021    FINDINGS: Cardiomediastinal silhouette is within normal  limits. Lungs are normally expanded with no airspace consolidation. No pleural effusion or pneumothorax. No acute osseous abnormality.    IMPRESSION: No acute pulmonary process.    Electronically signed by:  Denise Estrada MD  5/5/2022 6:10 AM CDT Workstation: [id number]                                I have personally reviewed pertinent radiological imaging and reports.     Patient care was time spent personally by me on the following activities: > 35 min  · Obtaining a history  · Examination of patient.  · Providing medical care at the patients bedside.  · Developing a treatment plan with patient or surrogate and bedside caregivers  · Ordering and reviewing laboratory studies, radiographic studies, pulse oximetry.  · Ordering and performing treatments and interventions.  · Evaluation of patient's response to treatment.  · Discussions with consultants while on the unit and immediately available to the patient.  · Re-evaluation of the patient's condition.  · Documentation in the medical record.     Ashleigh Soria MD MPH  [facility]  [address] [place] [id number]  [phone] (p)  [phone] (f)

## 2022-05-13 NOTE — OP NOTE
Operative Report     Patient name: Leanna García   MRN: [id number]  Date of surgery: 5/13/2022    Surgeon: Ricardo Bruno DPM   Assistant:  None    Preoperative diagnosis:  Abscess left foot  Postoperative diagnosis:  Multiple abscesses left foot  Procedure:  Incision and drainage below fascia of multiple abscesses left foot  Anesthesia:  General  Hemostasis:  Pneumatic ankle tourniquet at 250 mmHg  Estimated blood loss:  5 mL   Specimen:  Swab culture from left foot abscess  Complications: None  Condition upon discharge: Stable    Procedure in detail:  Patient was brought the operating room placed the operating table in a supine position.  Following induction general anesthesia a well-padded pneumatic ankle tourniquet was placed on the patient's left ankle and set at 250 mmHg.  The left foot was then prepped scrubbed and draped normal aseptic manner.  A time-out was then called.  An Esmarch bandage was then utilized to exsanguinate the left foot and pneumatic ankle tourniquet was inflated to 250 mmHg.  At this time attention was directed the patient's left foot there was noted to be a large full-thickness ulceration with a moderate amount of surrounding edema and erythema.  I was easily able to express purulence from both the proximal and distal portions of this wound.  At this time utilizing sharp and blunt dissection the proximal and distal portions of this wound were explored.  The proximal portion was noted to tunnel proximally and laterally across the plantar aspect of the foot.  There is also noted to be some tunneling proximally and dorsally towards the ankle joint.  Dissection was carried below fascia to the bone in each direction.  The distal tunneling went along the course of the flexor hallucis longus tendon.  At this time utilizing a 15. Blade 2 separate incisions were made on the medial aspect of the foot 1 proximal and 1 distal to the existing wound.  Blunt dissection was carried down and these were  connected to the open ulceration.  A moderate amount of purulence was encountered from each of these locations.  A swab culture was taken of the drainage.  Once all of the areas were fully opened I was unable to express any additional purulence.  The foot was copiously irrigated with sterile saline.  The to incisions were then packed with quarter-inch packing.  A local block consisting of 30 cc of 0.5% Marcaine plain was utilized a block the left foot.  The left foot was then dressed with 4x4s ABD pad Kerlix and an Ace wrap.  Patient tolerated the procedure well.  She had anesthesia reversed and left the operating Room stable vitals.    Patient will return to her room on the floor.  I am going to order a wound VAC to be reapplied to the left foot.

## 2022-05-13 NOTE — CARE UPDATE
AURELIO notified Idalia with [facility] of dialysis set up. [facility] facility can not provide transportation on Saturday. CM called [facility] and M,W,F not available. CM inquired if [facility] would allow family to provide transportation. Idalia checking with [facility] administration.

## 2022-05-14 LAB
ANION GAP SERPL CALC-SCNC: 13 MMOL/L (ref 8–16)
ANISOCYTOSIS BLD QL SMEAR: SLIGHT
BASOPHILS NFR BLD: 0 % (ref 0–1.9)
BUN SERPL-MCNC: 68 MG/DL (ref 6–20)
CALCIUM SERPL-MCNC: 9.8 MG/DL (ref 8.7–10.5)
CHLORIDE SERPL-SCNC: 97 MMOL/L (ref 95–110)
CK SERPL-CCNC: 31 U/L (ref 20–180)
CO2 SERPL-SCNC: 25 MMOL/L (ref 23–29)
CREAT SERPL-MCNC: 6.4 MG/DL (ref 0.5–1.4)
CRP SERPL-MCNC: 19.97 MG/DL
DIFFERENTIAL METHOD: ABNORMAL
EOSINOPHIL NFR BLD: 0 % (ref 0–8)
ERYTHROCYTE [DISTWIDTH] IN BLOOD BY AUTOMATED COUNT: 15.7 % (ref 11.5–14.5)
EST. GFR  (AFRICAN AMERICAN): 7.7 ML/MIN/1.73 M^2
EST. GFR  (NON AFRICAN AMERICAN): 6.7 ML/MIN/1.73 M^2
GLUCOSE SERPL-MCNC: 101 MG/DL (ref 70–110)
GLUCOSE SERPL-MCNC: 187 MG/DL (ref 70–110)
GLUCOSE SERPL-MCNC: 216 MG/DL (ref 70–110)
GLUCOSE SERPL-MCNC: 277 MG/DL (ref 70–110)
GLUCOSE SERPL-MCNC: 95 MG/DL (ref 70–110)
HCT VFR BLD AUTO: 32.1 % (ref 37–48.5)
HGB BLD-MCNC: 9.9 G/DL (ref 12–16)
IMM GRANULOCYTES # BLD AUTO: ABNORMAL K/UL (ref 0–0.04)
IMM GRANULOCYTES NFR BLD AUTO: ABNORMAL % (ref 0–0.5)
LYMPHOCYTES NFR BLD: 14 % (ref 18–48)
MCH RBC QN AUTO: 28 PG (ref 27–31)
MCHC RBC AUTO-ENTMCNC: 30.8 G/DL (ref 32–36)
MCV RBC AUTO: 91 FL (ref 82–98)
METAMYELOCYTES NFR BLD MANUAL: 1 %
MONOCYTES NFR BLD: 2 % (ref 4–15)
MYELOCYTES NFR BLD MANUAL: 2 %
NEUTROPHILS NFR BLD: 78 % (ref 38–73)
NEUTS BAND NFR BLD MANUAL: 3 %
NRBC BLD-RTO: 0 /100 WBC
PLATELET # BLD AUTO: 447 K/UL (ref 150–450)
PMV BLD AUTO: 9.8 FL (ref 9.2–12.9)
POTASSIUM SERPL-SCNC: 4.5 MMOL/L (ref 3.5–5.1)
RBC # BLD AUTO: 3.53 M/UL (ref 4–5.4)
SODIUM SERPL-SCNC: 135 MMOL/L (ref 136–145)
WBC # BLD AUTO: 24.34 K/UL (ref 3.9–12.7)

## 2022-05-14 PROCEDURE — 86140 C-REACTIVE PROTEIN: CPT | Performed by: INTERNAL MEDICINE

## 2022-05-14 PROCEDURE — 25000003 PHARM REV CODE 250: Performed by: PODIATRIST

## 2022-05-14 PROCEDURE — 99231 SBSQ HOSP IP/OBS SF/LOW 25: CPT | Mod: ,,, | Performed by: INTERNAL MEDICINE

## 2022-05-14 PROCEDURE — 36415 COLL VENOUS BLD VENIPUNCTURE: CPT | Performed by: NURSE PRACTITIONER

## 2022-05-14 PROCEDURE — 82550 ASSAY OF CK (CPK): CPT | Performed by: NURSE PRACTITIONER

## 2022-05-14 PROCEDURE — 25000003 PHARM REV CODE 250: Performed by: INTERNAL MEDICINE

## 2022-05-14 PROCEDURE — 63600175 PHARM REV CODE 636 W HCPCS: Performed by: NURSE PRACTITIONER

## 2022-05-14 PROCEDURE — 82962 GLUCOSE BLOOD TEST: CPT

## 2022-05-14 PROCEDURE — 25000003 PHARM REV CODE 250: Performed by: NURSE PRACTITIONER

## 2022-05-14 PROCEDURE — 63600175 PHARM REV CODE 636 W HCPCS: Performed by: INTERNAL MEDICINE

## 2022-05-14 PROCEDURE — 12000002 HC ACUTE/MED SURGE SEMI-PRIVATE ROOM

## 2022-05-14 PROCEDURE — 85007 BL SMEAR W/DIFF WBC COUNT: CPT | Performed by: NURSE PRACTITIONER

## 2022-05-14 PROCEDURE — 99232 SBSQ HOSP IP/OBS MODERATE 35: CPT | Mod: ,,, | Performed by: PODIATRIST

## 2022-05-14 PROCEDURE — 80048 BASIC METABOLIC PNL TOTAL CA: CPT | Performed by: NURSE PRACTITIONER

## 2022-05-14 PROCEDURE — 99232 PR SUBSEQUENT HOSPITAL CARE,LEVL II: ICD-10-PCS | Mod: ,,, | Performed by: PODIATRIST

## 2022-05-14 PROCEDURE — 99231 PR SUBSEQUENT HOSPITAL CARE,LEVL I: ICD-10-PCS | Mod: ,,, | Performed by: INTERNAL MEDICINE

## 2022-05-14 PROCEDURE — 85027 COMPLETE CBC AUTOMATED: CPT | Performed by: NURSE PRACTITIONER

## 2022-05-14 RX ADMIN — AZELASTINE HYDROCHLORIDE 137 MCG: 137 SPRAY, METERED NASAL at 09:05

## 2022-05-14 RX ADMIN — CALCIUM ACETATE 667 MG: 667 CAPSULE ORAL at 12:05

## 2022-05-14 RX ADMIN — CALCIUM ACETATE 667 MG: 667 CAPSULE ORAL at 05:05

## 2022-05-14 RX ADMIN — ASPIRIN 81 MG: 81 TABLET, COATED ORAL at 09:05

## 2022-05-14 RX ADMIN — DAPTOMYCIN 500 MG: 500 INJECTION, POWDER, LYOPHILIZED, FOR SOLUTION INTRAVENOUS at 09:05

## 2022-05-14 RX ADMIN — INSULIN ASPART 1 UNITS: 100 INJECTION, SOLUTION INTRAVENOUS; SUBCUTANEOUS at 05:05

## 2022-05-14 RX ADMIN — INSULIN ASPART 3 UNITS: 100 INJECTION, SOLUTION INTRAVENOUS; SUBCUTANEOUS at 09:05

## 2022-05-14 RX ADMIN — HYDROCODONE BITARTRATE AND ACETAMINOPHEN 1 TABLET: 5; 325 TABLET ORAL at 06:05

## 2022-05-14 RX ADMIN — HYDROCODONE BITARTRATE AND ACETAMINOPHEN 1 TABLET: 10; 325 TABLET ORAL at 05:05

## 2022-05-14 RX ADMIN — INSULIN DETEMIR 20 UNITS: 100 INJECTION, SOLUTION SUBCUTANEOUS at 09:05

## 2022-05-14 RX ADMIN — THERA TABS 1 TABLET: TAB at 09:05

## 2022-05-14 RX ADMIN — OXYCODONE HYDROCHLORIDE AND ACETAMINOPHEN 500 MG: 500 TABLET ORAL at 09:05

## 2022-05-14 RX ADMIN — PIPERACILLIN AND TAZOBACTAM 3.38 G: 3; .375 INJECTION, POWDER, LYOPHILIZED, FOR SOLUTION INTRAVENOUS; PARENTERAL at 01:05

## 2022-05-14 RX ADMIN — HYDROCODONE BITARTRATE AND ACETAMINOPHEN 1 TABLET: 10; 325 TABLET ORAL at 10:05

## 2022-05-14 RX ADMIN — FLUTICASONE PROPIONATE 100 MCG: 50 SPRAY, METERED NASAL at 09:05

## 2022-05-14 RX ADMIN — CALCIUM ACETATE 667 MG: 667 CAPSULE ORAL at 09:05

## 2022-05-14 RX ADMIN — INSULIN ASPART 3 UNITS: 100 INJECTION, SOLUTION INTRAVENOUS; SUBCUTANEOUS at 12:05

## 2022-05-14 NOTE — PROGRESS NOTES
[facility]  Podiatry  Progress Note    Patient Name: Leanna García  MRN: [id number]  Admission Date: 5/4/2022  Hospital Length of Stay: 10 days  Attending Physician: Usman Stacy MD  Primary Care Provider: Stefano Lopez MD     Subjective:     Interval History: Pt seen at bedside. States moderate pain to her foot, though controlled. No other new complaints.     Follow-up For: Procedure(s) (LRB):  INCISION AND DRAINAGE, FOOT (Left)    Post-Operative Day: 1 Day Post-Op    Scheduled Meds:   ascorbic acid (vitamin C)  500 mg Oral Daily    aspirin  81 mg Oral Daily    azelastine  1 spray Nasal BID    calcium acetate(phosphat bind)  667 mg Oral TID WM    cetirizine  10 mg Oral Every other day    collagenase   Topical (Top) Daily    DAPTOmycin (CUBICIN)  IV  500 mg Intravenous Q48H    epoetin chris-epbx  100 Units/kg Intravenous Every Mon, Wed, Fri    fluticasone propionate  2 spray Each Nostril Daily    insulin detemir U-100  20 Units Subcutaneous QHS    multivitamin  1 tablet Oral Daily     Continuous Infusions:  PRN Meds:sodium chloride, sodium chloride 0.9%, sodium chloride 0.9%, acetaminophen, benzonatate, dextrose 50%, dextrose 50%, heparin (porcine), HYDROcodone-acetaminophen, HYDROcodone-acetaminophen, HYDROcodone-acetaminophen, insulin aspart U-100, naloxone, ondansetron, ondansetron, polyethylene glycol, promethazine, sodium chloride 0.9%, sodium chloride 0.9%, sodium chloride 0.9%, sodium chloride 0.9%    Review of Systems   Constitutional: Negative for chills, fatigue, fever and unexpected weight change.   HENT: Negative for hearing loss and trouble swallowing.    Eyes: Negative for photophobia and visual disturbance.   Respiratory: Negative for cough, shortness of breath and wheezing.    Cardiovascular: Negative for chest pain, palpitations and leg swelling.   Gastrointestinal: Negative for abdominal pain and nausea.   Genitourinary: Negative for dysuria and frequency.    Musculoskeletal: Positive for arthralgias, gait problem and joint swelling. Negative for back pain and myalgias.   Skin: Positive for color change and wound. Negative for rash.   Neurological: Positive for weakness and numbness. Negative for tremors, seizures and headaches.   Hematological: Does not bruise/bleed easily.     Objective:     Vital Signs (Most Recent):  Temp: 98.1 °F (36.7 °C) (05/14/22 1635)  Pulse: 62 (05/14/22 1635)  Resp: 18 (05/14/22 1635)  BP: (!) 166/87 (05/14/22 1717)  SpO2: (!) 94 % (05/14/22 1635) Vital Signs (24h Range):  Temp:  [97.2 °F (36.2 °C)-98.2 °F (36.8 °C)] 98.1 °F (36.7 °C)  Pulse:  [52-88] 62  Resp:  [18-19] 18  SpO2:  [92 %-97 %] 94 %  BP: (135-186)/(56-96) 166/87     Weight: 90.4 kg (199 lb 4.7 oz)  Body mass index is 31.21 kg/m².    Foot Exam    Laboratory:  A1C:   Recent Labs   Lab 03/10/22  0650 05/05/22  0320   HGBA1C >14.0* 10.6*     CBC:   Recent Labs   Lab 05/14/22  0554   WBC 24.34*   RBC 3.53*   HGB 9.9*   HCT 32.1*      MCV 91   MCH 28.0   MCHC 30.8*     CMP:   Recent Labs   Lab 05/14/22  0554   GLU 95   CALCIUM 9.8   *   K 4.5   CO2 25   CL 97   BUN 68*   CREATININE 6.4*     CRP:   Recent Labs   Lab 05/14/22  0554   CRP 19.97*     ESR: No results for input(s): SEDRATE in the last 168 hours.  Wound Cultures:   Recent Labs   Lab 05/06/22  0753 05/13/22  0800   LABAERO METHICILLIN RESISTANT STAPHYLOCOCCUS AUREUS  Many  Results called to and read back by Kathy Maki RN-BCARD;    05/08/2022  08:32 CJD  * No growth       Diagnostic Results:  I have reviewed all pertinent imaging results/findings within the past 24 hours.    Clinical Findings:      Wound vac in place and functioning well. There is decreased edema and erythema to the foot.     Assessment/Plan:     Active Diagnoses:    Diagnosis Date Noted POA    PRINCIPAL PROBLEM:  Bacteremia [R78.81] 05/05/2022 Unknown    Discharge planning issues [Z02.9] 05/11/2022 Not Applicable    Abscess of  left foot [L02.612] 05/08/2022 Yes    Chronic anemia [D64.9] 05/08/2022 Unknown    Foot infection [L08.9]  Yes    Diabetic foot infection [E11.628, L08.9]  Yes    Sepsis [A41.9]  Yes    Diabetic ulcer of left midfoot [E11.621, L97.429] 05/05/2022 Yes    Enteritis [K52.9] 05/05/2022 Yes    Hypokalemia [E87.6] 05/05/2022 Yes    Elevated troponin [R77.8] 05/05/2022 Yes    Acute bronchitis [J20.9]  Unknown    Cough [R05.9]  Unknown    Type 2 diabetes mellitus with kidney complication, with long-term current use of insulin [E11.29, Z79.4] 06/25/2020 Not Applicable    ESRD (end stage renal disease) [N18.6] 05/18/2018 Yes      Problems Resolved During this Admission:    Diagnosis Date Noted Date Resolved POA    Type 2 diabetes mellitus with hyperglycemia, without long-term current use of insulin [E11.65] 06/25/2020 05/05/2022 Yes       Plan is to continue wound vac therapy. Pt's foot is progressing well at this time. She is okay for d/c to SNF from a podiatry standpoint.     Ricardo Bruno DPM  Podiatry  [facility]

## 2022-05-14 NOTE — PT/OT/SLP PROGRESS
Physical Therapy      Patient Name:  Leanna García   MRN:  [id number]    Patient not seen today secondary to Patient fatigue. Pt reports 10/10 L foot pain and having a sleepless night. PT spent 10 minutes in room with pt to allow for patient self expression. Nurse notified of patient's pain and request for hot pack. Pt requested for PT to return tomorrow. Will follow-up tomorrow.

## 2022-05-14 NOTE — ASSESSMENT & PLAN NOTE
Gram positive rods on BC X 1 :Bacillus cereus   Had enteritis/Food poisoning which has been resolved(ate chinese food before onset of symptoms)  Repeat Blood cultures so far normal  S/p Incision/drainage/debridement of  Left foot deep tissue abscess below fascia muscle and tendon  Again had Incision and drainage below fascia of multiple abscesses left foot on 05/13  Continue Daptomycin 500 mg IV q48h for 5 weeks  Medically stable to go to SNF

## 2022-05-14 NOTE — PROGRESS NOTES
INPATIENT NEPHROLOGY Progress Note  [facility] NEPHROLOGY    Leanna García  05/14/2022    Reason for consultation:    ESRD    Chief Complaint:   Chief Complaint   Patient presents with    Fever          History of Present Illness:    Per H and P    Ms. García is a 56-year-old female with a past medical history of ESRD on HD Monday Wednesday Friday, hypertension, IDDM2, PAF, and chronic cough who presents today with complaints of fever up to 103. It is severe.  It is associated with cough, posttussive vomiting, fatigue, sinus congestion, weakness, and a left foot ulcer.  She denies chest pain, diarrhea, dizziness, loss of consciousness.  In the ED she was noted to have a large left foot ulceration at the plantar surface and on the side of the foot.  CT of abdomen pelvis also reveals possible enteritis and bilateral perinephric fat stranding.  WBCs 21 K, troponin is 0.4 in the setting of ESRD.  She did not go to dialysis today she felt too bad.  Glucose is 446, anion gap 20, bicarb 24.     5/5  C/o foot pain.  Mild dyspnea.  No vomiting, chest pain, urinary or bowel complaints.  Weak  5/6  Currently getting wound vac placed.  Has foot pain. No respiratory distress  5/7 still spiking fevers.  Foot pain.  Stopped hd early yesterday  5/8  Tired today.  No vomiting or sob.  Tmax 101.9 at 1900 yesterday  5/9 wound vac today  5/10 no issues with HD yest- got 1u of blood- net UF 2.6L- waiting for wound vac- gave ok with PICC  5/11 febrile, BP stable, on 2L NC- seen on HD- no complaints; has wound vac; looking to [facility] at [facility]  5/12 plan for [facility]  5/13 went for I&D of L foot abscesses   5/14 no events overnight    Plan of Care:     ESRD on HD MWF  --continue dialysis per routine    Hypertension  --low salt diet 2g/day  --uf with hd    Hyponatremia  --fluid restrict 1.5L/day  --140 Na dialysate    SHPT  --continue binder with meals     Anemia of CKD  --Hb is better after transfusion on 5/9-  stabilizing  --continue FERMÍN with HD    Diab Foot Ulcer- MRSA  --podiatry following, getting procedures PRN  --wound vac in place  --dose antbx for CrCl< 10/HD    Thank you for allowing us to participate in this patient's care. We will continue to follow.    Vital Signs:  Temp Readings from Last 3 Encounters:   05/14/22 97.2 °F (36.2 °C) (Oral)   04/26/22 98.2 °F (36.8 °C)   03/10/22 97.3 °F (36.3 °C) (Tympanic)       Pulse Readings from Last 3 Encounters:   05/14/22 (!) 55   04/26/22 66   04/05/22 75       BP Readings from Last 3 Encounters:   05/14/22 (!) 142/66   04/28/22 (!) 180/82   04/26/22 (!) 174/66       Weight:  Wt Readings from Last 3 Encounters:   05/14/22 90.4 kg (199 lb 4.7 oz)   05/06/22 86.6 kg (191 lb)   04/28/22 94 kg (207 lb 5.5 oz)       Medications:  No current facility-administered medications on file prior to encounter.     Current Outpatient Medications on File Prior to Encounter   Medication Sig Dispense Refill    ascorbic acid, vitamin C, (VITAMIN C) 500 MG tablet Take 500 mg by mouth once daily.      aspirin (ECOTRIN) 81 MG EC tablet Take 81 mg by mouth once daily.      atorvastatin (LIPITOR) 80 MG tablet Take 1 tablet (80 mg total) by mouth once daily. (Patient taking differently: Take 80 mg by mouth every evening.) 90 tablet 3    blood sugar diagnostic Strp To check BG 4 times daily, to use with insurance preferred meter (Patient taking differently: 1 strip by Misc.(Non-Drug; Combo Route) route 4 (four) times daily. To check BG 4 times daily, to use with insurance preferred meter) 450 strip 3    insulin aspart U-100 (NOVOLOG FLEXPEN U-100 INSULIN) 100 unit/mL (3 mL) InPn pen Inject 5-8 units 3 times per day with food. 1 Box 6    insulin detemir U-100 (LEVEMIR FLEXTOUCH U-100 INSULN) 100 unit/mL (3 mL) InPn pen Inject 15-18 Units into the skin once daily. 1 Box 6    lancets Misc To use to check blood sugar 4 times daily. To use with insurance approved meter. Patient needs the round,  "purple one (Patient taking differently: 1 lancet by Misc.(Non-Drug; Combo Route) route 4 (four) times daily. To use to check blood sugar 4 times daily. To use with insurance approved meter. Patient needs the round, purple one) 450 each 3    metoprolol tartrate (LOPRESSOR) 25 MG tablet Take 25 mg by mouth 2 (two) times daily.      minoxidiL (LONITEN) 2.5 MG tablet Take 5 mg by mouth 2 (two) times daily.      multivitamin (THERAGRAN) per tablet Take 1 tablet by mouth once daily.      pen needle, diabetic (BD ULTRA-FINE ROBERT PEN NEEDLE) 32 gauge x 5/32" Ndle To use 4 times per day with insulin injections. (Patient taking differently: 1 pen by Misc.(Non-Drug; Combo Route) route 4 (four) times daily. To use 4 times per day with insulin injections.) 450 each 2    sucroferric oxyhydroxide (VELPHORO) 500 mg Chew Take 500 mg by mouth 3 (three) times daily.      dextrose (GLUCOSE GEL) 40 % gel Take 37.5 mLs (15,000 mg total) by mouth once as needed (hypoglycemia). 37.5 g 4    gabapentin (NEURONTIN) 300 MG capsule Take 300 mg by mouth every evening.      glucagon (BAQSIMI) 3 mg/actuation Spry 3 mg (one actuation) into a single nostril; if no response, may repeat in 15 minutes using a new intranasal device. (Patient taking differently: 3 mg by Left Nostril route as needed. 3 mg (one actuation) into a single nostril; if no response, may repeat in 15 minutes using a new intranasal device.) 2 each 1    [DISCONTINUED] blood-glucose meter (ACCU-CHEK KAYLIE PLUS METER) Misc To use to check blood sugars 4 times a day 1 each 0    [DISCONTINUED] clorazepate (TRANXENE) 3.75 MG Tab Take 7.5 mg by mouth nightly as needed.       [DISCONTINUED] fenofibrate 160 MG Tab Take 1 tablet (160 mg total) by mouth once daily. 90 tablet 3    [DISCONTINUED] lancing device with lancets (ACCU-CHEK SOFT DEV LANCETS) Kit To check blood sugars 4 times per day 400 each 3     Scheduled Meds:   ascorbic acid (vitamin C)  500 mg Oral Daily    " "aspirin  81 mg Oral Daily    azelastine  1 spray Nasal BID    calcium acetate(phosphat bind)  667 mg Oral TID WM    cetirizine  10 mg Oral Every other day    collagenase   Topical (Top) Daily    DAPTOmycin (CUBICIN)  IV  500 mg Intravenous Q48H    epoetin chris-epbx  100 Units/kg Intravenous Every Mon, Wed, Fri    fluticasone propionate  2 spray Each Nostril Daily    insulin detemir U-100  20 Units Subcutaneous QHS    multivitamin  1 tablet Oral Daily     Continuous Infusions:  PRN Meds:.sodium chloride, sodium chloride 0.9%, sodium chloride 0.9%, acetaminophen, benzonatate, dextrose 50%, dextrose 50%, heparin (porcine), HYDROcodone-acetaminophen, HYDROcodone-acetaminophen, HYDROcodone-acetaminophen, insulin aspart U-100, naloxone, ondansetron, ondansetron, polyethylene glycol, promethazine, sodium chloride 0.9%, sodium chloride 0.9%, sodium chloride 0.9%, sodium chloride 0.9%    Review of Systems:  Off floor    Physical Exam:    BP (!) 142/66   Pulse (!) 55   Temp 97.2 °F (36.2 °C) (Oral)   Resp 18   Ht 5' 7" (1.702 m)   Wt 90.4 kg (199 lb 4.7 oz)   SpO2 (!) 92%   BMI 31.21 kg/m²     Off floor    Results:  Lab Results   Component Value Date     (L) 05/14/2022    K 4.5 05/14/2022    CL 97 05/14/2022    CO2 25 05/14/2022    BUN 68 (H) 05/14/2022    CREATININE 6.4 (H) 05/14/2022    CALCIUM 9.8 05/14/2022    ANIONGAP 13 05/14/2022    ESTGFRAFRICA 7.7 (A) 05/14/2022    EGFRNONAA 6.7 (A) 05/14/2022       Lab Results   Component Value Date    CALCIUM 9.8 05/14/2022    PHOS 3.3 05/10/2022       Recent Labs   Lab 05/14/22  0554   WBC 24.34*   RBC 3.53*   HGB 9.9*   HCT 32.1*      MCV 91   MCH 28.0   MCHC 30.8*        Imaging Results          X-Ray Foot Complete Left (Final result)  Result time 05/05/22 06:12:52    Final result by Denise Estrada MD (05/05/22 06:12:52)                 Narrative:    Three views of the left foot are compared to prior study dated 12/3/2021    Clinical history is " infection    There is osteopenia. There has been prior amputation of the fourth digit at the base of the proximal phalanx.    There are moderate degenerative changes of the midfoot and hindfoot with joint space narrowing and spurring.. There are no fractures, dislocations or osteolysis. There is a subcutaneous ulceration the mid plantar soft tissues. There is moderate peripheral vascular calcification.    IMPRESSION: Prior amputation of fourth digit without radiographic evidence for osteomyelitis.    Moderate degenerative changes of the foot    16mm soft tissue ulceration in the mid plantar medial soft tissues    Electronically signed by:  Denise Estrada MD  5/5/2022 6:12 AM CDT Workstation: [id number]                             CT Abdomen Pelvis  Without Contrast (Final result)  Result time 05/04/22 21:53:02    Final result by Ernesto Lino MD (05/04/22 21:53:02)                 Narrative:    EXAM: CT ABDOMEN PELVIS WITHOUT CONTRAST    RadLex: CT ABDOMEN PELVIS WITHOUT IV CONTRAST    HISTORY: 56 years  Female;  Abdominal pain, acute, nonlocalized;    TECHNIQUE:   Standard CT of the abdomen and pelvis was performed without the use of intravenous contrast media. Lack of intravenous contrast limits evaluation of soft tissue structures in this study.    CT scans at this facility use dose modulation, iterative reconstruction and/or weight based dosing when appropriate to reduce radiation dose to as low as reasonably achievable.    COMPARISON: None available    FINDING(S):    ABDOMEN:    Lung bases show dependent atelectasis. Coronary artery calcifications are seen. Cardiac size normal. Mild thickening of the distal esophagus with small hiatal hernia.    Small amount of fluid is seen in the stomach. Mild thickening of the stomach lining.    Mild hepatic steatosis. Spleen, pancreas, and both adrenals are normal.    There is gallbladder distention with multiple gallstones.    Neither kidney shows hydronephrosis.  There is moderate bilateral perinephric stranding which appears above expected and may represent inflammation. Correlate with urinalysis to exclude the possibility of pyelonephritis.    There are vascular calcifications involving both kidneys. There may be separate small stones as well. The ureters themselves are decompressed.    Marked atherosclerosis affects the aorta and the major branch vessels. No abdominal lymphadenopathy or free abdominal fluid.    The small intestines contain a small to moderate amount of fluid with scattered air-fluid levels. No transition point to suggest obstruction.    No evidence of appendicitis. Oral contrast is seen in the intestines. There is a mild to moderate stool burden. Scattered colonic diverticulosis.    PELVIS:    Bladder is decompressed. No free fluid within the pelvis. There is an enlarged left external iliac lymph node measuring 1.6 x 0.8 cm on series 3, image 210.    No other groin or pelvis lymphadenopathy. A small left inguinal hernia contains only fat.    Small umbilical hernia contains only fat.    There are degenerative changes in the lower lumbar spine.    IMPRESSION:    1.  Small to moderate amount of fluid in the small intestines with scattered air fluid levels, possibly enteritis  2.  Moderate bilateral perinephric stranding. Correlate with urinalysis to exclude the possibility of pyelonephritis  3.  Gallbladder distention with gallstones    Electronically signed by:  Ernesto Lino MD  5/4/2022 9:53 PM CDT Workstation: [id number]                             X-Ray Chest 1 View (Final result)  Result time 05/05/22 06:10:56    Final result by Denise Estrada MD (05/05/22 06:10:56)                 Narrative:    XR CHEST 1 VIEW    CLINICAL HISTORY:  56 years Female pain    COMPARISON: 5/19/2021    FINDINGS: Cardiomediastinal silhouette is within normal limits. Lungs are normally expanded with no airspace consolidation. No pleural effusion or pneumothorax. No acute  osseous abnormality.    IMPRESSION: No acute pulmonary process.    Electronically signed by:  Denise Estrada MD  5/5/2022 6:10 AM CDT Workstation: [id number]                                I have personally reviewed pertinent radiological imaging and reports.     Patient care was time spent personally by me on the following activities: > 35 min  · Obtaining a history  · Examination of patient.  · Providing medical care at the patients bedside.  · Developing a treatment plan with patient or surrogate and bedside caregivers  · Ordering and reviewing laboratory studies, radiographic studies, pulse oximetry.  · Ordering and performing treatments and interventions.  · Evaluation of patient's response to treatment.  · Discussions with consultants while on the unit and immediately available to the patient.  · Re-evaluation of the patient's condition.  · Documentation in the medical record.     Ashleigh Soria MD MPH  [facility]  [address] [place] [id number]  [phone] (p)  [phone] (f)

## 2022-05-14 NOTE — PLAN OF CARE
Problem: Adult Inpatient Plan of Care  Goal: Plan of Care Review  Outcome: Ongoing, Progressing  Goal: Optimal Comfort and Wellbeing  Outcome: Ongoing, Progressing     Problem: Diabetes Comorbidity  Goal: Blood Glucose Level Within Targeted Range  Outcome: Ongoing, Progressing     Problem: Fall Injury Risk  Goal: Absence of Fall and Fall-Related Injury  Outcome: Ongoing, Progressing     Problem: Skin Injury Risk Increased  Goal: Skin Health and Integrity  Outcome: Ongoing, Progressing     Problem: Infection  Goal: Absence of Infection Signs and Symptoms  Outcome: Ongoing, Progressing

## 2022-05-14 NOTE — PROGRESS NOTES
NMConsult Note  Infectious Disease    Reason for Consult:  Bacillus cereus bacteremia, and MRSA osteomyelitis     HPI: Leanna García is a 56 y.o. female known to me from prior consultation in September of 2018, was admitted through the emergency room yesterday with fever 103,   Vomiting of 1 day's duration, and chronic sinus congestion, postnasal drip, cough at least several weeks duration and a foot ulcer on the plantar/medial surface surface of the left foot of probably several months duration.  The vomiting began within a few hours of a meal at a [employer] restaurant.  She did not have any diarrhea In the emergency room she had a CT scan of the abdomen which suggested possible enteritis and bilateral perinephric fat stranding, as well as cholelithiasis, and extensive vascular calcifications.  her blood sugar was 446, white blood cells 21,000, normal lactic acid.  Photographs taken in the emergency room suggest cellulitis of the medial and plantar foot She was started on Zosyn and doxycycline as she has history of vancomycin allergy. .  She was seen by Podiatry , and MRI did not demonstrate any abscess or drainage and wound care was ordered.  She was noted this morning to have difficulty swallowing and was seen by speech therapy.  Today 1 anaerobic bottle has a Gram-positive mariana prompting this consult.    5/6: interim reviewed. Still febrile through last night. Blood culture does have characteristics of Bacillus cereus. This will be sent out to [facility] for MALDI ID. Respiratory pcr panel was negative. She was able to eat solid food this am. She denies abdominal pain. Podiatry debrided her foot this am and submitted cultures and is ordering a WBC Scan. Coughing less. No wheezes.     5/7 (Daren):  Interim reviewed, discussed with Dr Bailon.  Patient seen and examined at bedside, states she does not feel good today, feels like she got something that is making her feel off, not her usual self.  Labile BP  121/58, low-grade fever T-max 102° yesterday, currently 100.8.  Wound cultures from left foot grew Staph aureus, pending sensitivities.  She is currently on Daptomycin, Zosyn and levofloxacin IV.    5/8: Interim reviewed, patient seen and examined at bedside. Feeling significantly better compared to yesterday.  Having lunch at bedside.  Hemodynamically stable, T-max 103° yesterday, currently afebrile.  Labs reviewed, white count 17, PMN 79.2%, no bands.  H&H 7/23.3, platelet count 332. Sodium 135, potassium 4.4.  Status post further debridement at bedside today by Podiatry, large deep tissue abscess seen on the plantar facial below the flexor tendon.  Mild necrosis noted and all nonviable tissue was debrided, cultures in process.  Micro updated, confirmed Bacillus cereus bacteremia, wound culture from left foot grew MRSA.    5/9 (Adamaris):  Interim reviewed.  Blood culture isolate confirmed to be bacillus serious, likely from the restaurant that she ate before the onset of her illness.  Wound cultures from 05/16//8 have MRSA.  As she is allergic to vancomycin she is on daptomycin.  Echocardiogram negative temperature is improved since the drainage of her foot though she did have a 101 temperature last night.  White blood cells remain elevated, hemoglobin 6.7, she is receiving blood, CRP is down about 10%. Not getting out of bed. Sugary soft drinks on her bedside table.   5/10: interim reviewed. Fever has finally resolved. Received blood with dialysis yesterday. Foot cultures have grown MSSA and MRSA. Currently struggling with constipation, miserable, on bedpan.   5/11: low grade temp again today. Per RN, wound care did note some purulence yesterday in foot wound at the time of wound vac application yesterday. Diarrhea? after laxatives. WBC still very high, midline in place now.   5/12: still has fever, and leukocytosis, plans for debridement in OR noted. KUB normal.  She currently has no complaints, is eating well,  no diarrhea, much improved cough and congestion compared to admission.  No IV site phlebitis, no rashes, no thrush, no inflammation associated with her left arm AV fistula, no dysuria.  5/14:  Interim reviewed fever seems to have resolved.  Operative note from yesterday reviewed.  Cultures taken in the operating room yesterday are no growth so far.  Plans for discharge to skilled nursing at [facility] noted.  Having some pain from surgical site today.  She has a warm compress on it which gives her some relief.    EXAM & DIAGNOSTICS REVIEWED:     Vitals:     Temp:  [97.2 °F (36.2 °C)-99.8 °F (37.7 °C)]   Temp: 97.2 °F (36.2 °C) (05/14/22 0731)  Pulse: (!) 55 (05/14/22 0731)  Resp: 18 (05/14/22 0731)  BP: (!) 142/66 (05/14/22 0731)  SpO2: (!) 92 % (05/14/22 0731)    Intake/Output Summary (Last 24 hours) at 5/14/2022 1100  Last data filed at 5/14/2022 0600  Gross per 24 hour   Intake 1050 ml   Output 2500 ml   Net -1450 ml        General:  In NAD. Alert and attentive, cooperative, comfortable on room air, completely nontoxic  Eyes:  Anicteric,  EOMI  ENT:  No ulcers, exudates, thrush, nares patent, edentulous  Neck:  Supple,   Lungs: Clear  Heart:  RRR,   Abd:  Soft, NT, non distended, normal BS, no guarding, no masses or organomegaly appreciated.  :  Voids  no flank tenderness  Musc:  Joints without effusion, swelling, erythema, synovitis but she does have lower extremity muscle wasting.  She has Charcot arthropathy with fallen arches bilaterally and an ulceration on the medial arch   Skin:  No rashes   Neuro: Alert, attentive, speech fluent, face symmetric, moves all extremities, no focal weakness.  Minimally Ambulatory at baseline  Psych:  Calm, cooperative  Lymphatic:        Extrem: Left foot with wound vac in place     No edema, erythema, phlebitis, warm and well perfused  VAD:  Left arm AV fistula  No redness , right arm midline    Isolation: contact - MRSA  Wound:           5/6:   This was  debrided full thickness this am    5/8:        5/9: bandage changed, packing not disturbed  5/11: wound vac in place  5/14:  Wound VAC in place, no new wound photos    General Labs reviewed:  Recent Labs   Lab 05/12/22  0648 05/13/22  0605 05/14/22  0554   WBC 23.45* 24.10* 24.34*   HGB 10.7* 9.5* 9.9*   HCT 34.5* 29.8* 32.1*   * 470* 447       Recent Labs   Lab 05/12/22  0648 05/13/22  0605 05/14/22  0554   * 131* 135*   K 4.4 4.6 4.5   CL 94* 92* 97   CO2 25 25 25   BUN 72* 104* 68*   CREATININE 6.5* 8.0* 6.4*   CALCIUM 9.7 10.1 9.8     Recent Labs   Lab 05/12/22  0648 05/13/22  0605 05/14/22  0554   CRP 25.26* 20.10* 19.97*         Micro:  Microbiology Results (last 7 days)     Procedure Component Value Units Date/Time    Aerobic culture [[id number]] Collected: 05/13/22 0800    Order Status: Completed Specimen: Wound from Foot, Left Updated: 05/14/22 0716     Aerobic Bacterial Culture No growth    Gram stain [[id number]] Collected: 05/13/22 0800    Order Status: Sent Specimen: Wound from Foot, Left Updated: 05/13/22 1048    Fungus culture [[id number]] Collected: 05/13/22 0800    Order Status: Sent Specimen: Wound from Foot, Left Updated: 05/13/22 1048    AFB Culture & Smear [[id number]] Collected: 05/13/22 0800    Order Status: Sent Specimen: Wound from Foot, Left Updated: 05/13/22 1048    Culture, Anaerobic [[id number]] Collected: 05/13/22 0800    Order Status: Sent Specimen: Wound from Foot, Left Updated: 05/13/22 1048    Blood culture [[id number]] Collected: 05/08/22 0723    Order Status: Completed Specimen: Blood Updated: 05/13/22 1032     Blood Culture, Routine No growth after 5 days.    Narrative:      Collection has been rescheduled by SLR at 05/08/2022 06:15 Reason:   Unable to collect  Collection has been rescheduled by RE1 at 05/08/2022 06:55 Reason:   Unable to collect   Collection has been rescheduled by SLR at 05/08/2022 06:15 Reason:   Unable to collect  Collection has been rescheduled by RE1  at 05/08/2022 06:55 Reason:   Unable to collect     Culture, Anaerobic [[id number]] Collected: 05/08/22 1003    Order Status: Completed Specimen: Abscess from Foot, Left Updated: 05/11/22 1708     Anaerobic Culture No anaerobes isolated    Blood culture [[id number]] Collected: 05/06/22 1100    Order Status: Completed Specimen: Blood Updated: 05/11/22 1232     Blood Culture, Routine No growth after 5 days.    Narrative:      Collection has been rescheduled by RE1 at 05/06/2022 08:51 Reason:   Having a scan  Collection has been rescheduled by SLR at 05/06/2022 10:27 Reason:   Unable to collect  Collection has been rescheduled by RE1 at 05/06/2022 10:35 Reason:   Unable to collect  Collection has been rescheduled by RE1 at 05/06/2022 08:51 Reason:   Having a scan  Collection has been rescheduled by SLR at 05/06/2022 10:27 Reason:   Unable to collect  Collection has been rescheduled by RE1 at 05/06/2022 10:35 Reason:   Unable to collect    Tissue culture [[id number]]  (Abnormal)  (Susceptibility) Collected: 05/08/22 1003    Order Status: Completed Specimen: Tissue Updated: 05/10/22 0650     Aerobic Culture - Tissue STAPHYLOCOCCUS AUREUS  Rare       Gram Stain Result Moderate WBC's      No organisms seen    Blood culture #2 **CANNOT BE ORDERED STAT** [[id number]] Collected: 05/04/22 2155    Order Status: Completed Specimen: Blood from Peripheral, Antecubital, Right Updated: 05/09/22 2232     Blood Culture, Routine No growth after 5 days.    Blood culture [[id number]]  (Abnormal) Collected: 05/04/22 2040    Order Status: Completed Specimen: Blood Updated: 05/09/22 1604     Blood Culture, Routine Gram stain lukasz bottle: Gram positive rods      Results called to and read back by:Landy Nation RN-3000;  05/05/2022        10:52 CJD      BACILLUS CEREUS    Culture, Anaerobic [[id number]] Collected: 05/06/22 0753    Order Status: Completed Specimen: Wound from Foot, Left Updated: 05/08/22 1011     Anaerobic Culture No anaerobes  isolated    Aerobic culture [[id number]] Collected: 05/08/22 1003    Order Status: Canceled Specimen: Tissue from Foot, Left     Aerobic culture [[id number]]  (Abnormal)  (Susceptibility) Collected: 05/06/22 0753    Order Status: Completed Specimen: Wound from Foot, Left Updated: 05/08/22 0832     Aerobic Bacterial Culture METHICILLIN RESISTANT STAPHYLOCOCCUS AUREUS  Many  Results called to and read back by Kathy Maki RN-BCARD;    05/08/2022  08:32 CJD      Culture, ID (Consult) [[id number]] Collected: 05/04/22 2040    Order Status: Completed Specimen: Blood Updated: 05/07/22 1422     Culture, Identification (consult) Bacillus cereus          Imaging Reviewed:   CXR   CT abdomen and pelvis 5/4   1.  Small to moderate amount of fluid in the small intestines with scattered air fluid levels, possibly enteritis 2.  Moderate bilateral perinephric stranding. Correlate with urinalysis to exclude the possibility of pyelonephritis 3.  Gallbladder distention with gallstones     MRI of the left midfoot 5/5  IMPRESSION: 16mm skin ulceration in the medial plantar soft tissues with associated cellulitis. There is no abscess or osteomyelitis   Moderate degenerative changes of the midfoot    NM scan Abnormal indium-111 WBC uptake along medial left foot suggesting source of infection.     KUB 5/12 WNL    Cardiology:   ECHO 5/6/22  The left ventricle is normal in size with normal systolic function.  The estimated ejection fraction is 65%.  Normal left ventricular diastolic function.  Normal right ventricular size with normal right ventricular systolic function.  Mild mitral regurgitation.  All valves visualized, normal.    IMPRESSION & PLAN     1. Bacillus cereus bacteremia likely in the setting of recent Chinese food ingestion   Blood cultures 5/6 & 5/8 no growth, pending final   Echocardiogram negative   [facility] health department notified    2. Left foot ulcer, deep tissue abscess/osteomyelitis status post debridement by  Podiatry 5/6 and 5/8, deep pocket of pus drained 5/9, debridement in OR 5/13   Wound culture 5/6 MRSA and MSSA    3.  ESRD on HD,  Nephrology following    4.  Diabetes with hyperglycemia, A1c 14%  5. constipation     Recommendations:     Continue Daptomycin 500 mg IV q48h open (or after dialysis on dialysis days) for MRSA, MSSA and Bacillus cereus for another 5 weeks  Repeat CPK for mild elevation  Weekly cpk, hold statins while on daptomycin  Zosyn.  Today  No opposition to transfer to skilled nursing now     Needs aggressive glycemic control  D/w  Patient      Will follow  peripherally while still here    Medical Decision Making during this encounter was  [_] Low Complexity  [_] Moderate Complexity  [x  ] High Complexity

## 2022-05-14 NOTE — CARE UPDATE
05/13/22 2000   Patient Assessment/Suction   Level of Consciousness (AVPU) alert   Respiratory Effort Normal;Unlabored   Expansion/Accessory Muscles/Retractions no use of accessory muscles;no retractions;expansion symmetric   All Lung Fields Breath Sounds Anterior:;diminished   Rhythm/Pattern, Respiratory unlabored;pattern regular;depth regular   PRE-TX-O2   O2 Device (Oxygen Therapy) room air   SpO2 95 %   Pulse Oximetry Type Intermittent   $ Pulse Oximetry - Multiple Charge Pulse Oximetry - Multiple   Education   $ Education 15 min   Respiratory Evaluation   $ Care Plan Tech Time 15 min   $ Eval/Re-eval Charges Re-evaluation

## 2022-05-14 NOTE — SUBJECTIVE & OBJECTIVE
Interval History:     Review of Systems   Constitutional:  Negative for activity change and appetite change.   HENT:  Negative for congestion and dental problem.    Eyes:  Negative for discharge and itching.   Respiratory:  Negative for shortness of breath.    Cardiovascular:  Negative for chest pain.   Gastrointestinal:  Negative for abdominal distention and abdominal pain.   Endocrine: Negative for cold intolerance.   Genitourinary:  Negative for difficulty urinating and dysuria.   Musculoskeletal:  Negative for arthralgias and back pain.   Skin:  Positive for wound. Negative for color change.   Neurological:  Negative for dizziness and facial asymmetry.   Hematological:  Negative for adenopathy.   Psychiatric/Behavioral:  Negative for agitation and behavioral problems.    Objective:     Vital Signs (Most Recent):  Temp: 97.9 °F (36.6 °C) (05/14/22 1133)  Pulse: (!) 55 (05/14/22 1133)  Resp: 18 (05/14/22 1133)  BP: 135/61 (05/14/22 1133)  SpO2: (!) 93 % (05/14/22 1133)   Vital Signs (24h Range):  Temp:  [97.2 °F (36.2 °C)-99.8 °F (37.7 °C)] 97.9 °F (36.6 °C)  Pulse:  [52-88] 55  Resp:  [16-20] 18  SpO2:  [92 %-98 %] 93 %  BP: (129-192)/(56-96) 135/61     Weight: 90.4 kg (199 lb 4.7 oz)  Body mass index is 31.21 kg/m².    Intake/Output Summary (Last 24 hours) at 5/14/2022 1334  Last data filed at 5/14/2022 0600  Gross per 24 hour   Intake 1050 ml   Output 2500 ml   Net -1450 ml      Physical Exam  Vitals and nursing note reviewed.   Constitutional:       General: She is not in acute distress.  HENT:      Head: Atraumatic.      Right Ear: External ear normal.      Left Ear: External ear normal.      Nose: Nose normal.      Mouth/Throat:      Mouth: Mucous membranes are moist.   Eyes:      General: No scleral icterus.  Cardiovascular:      Rate and Rhythm: Normal rate.   Pulmonary:      Effort: Pulmonary effort is normal.   Abdominal:      General: There is no distension.   Musculoskeletal:      Cervical back: Normal  range of motion.      Comments: L foot bandage intact   Skin:     General: Skin is warm.   Neurological:      Mental Status: She is alert and oriented to person, place, and time.       Significant Labs: All pertinent labs within the past 24 hours have been reviewed.  CBC:   Recent Labs   Lab 05/13/22  0605 05/14/22  0554   WBC 24.10* 24.34*   HGB 9.5* 9.9*   HCT 29.8* 32.1*   * 447     CMP:   Recent Labs   Lab 05/13/22  0605 05/14/22  0554   * 135*   K 4.6 4.5   CL 92* 97   CO2 25 25   * 95   * 68*   CREATININE 8.0* 6.4*   CALCIUM 10.1 9.8   ANIONGAP 14 13   EGFRNONAA 5.1* 6.7*       Significant Imaging: I have reviewed all pertinent imaging results/findings within the past 24 hours.

## 2022-05-15 LAB
ANION GAP SERPL CALC-SCNC: 20 MMOL/L (ref 8–16)
ANISOCYTOSIS BLD QL SMEAR: SLIGHT
BACTERIA SPEC AEROBE CULT: NORMAL
BASOPHILS NFR BLD: 0 % (ref 0–1.9)
BUN SERPL-MCNC: 88 MG/DL (ref 6–20)
CALCIUM SERPL-MCNC: 9.4 MG/DL (ref 8.7–10.5)
CHLORIDE SERPL-SCNC: 94 MMOL/L (ref 95–110)
CO2 SERPL-SCNC: 19 MMOL/L (ref 23–29)
CREAT SERPL-MCNC: 8.2 MG/DL (ref 0.5–1.4)
CRP SERPL-MCNC: 18.24 MG/DL
DIFFERENTIAL METHOD: ABNORMAL
EOSINOPHIL NFR BLD: 0 % (ref 0–8)
ERYTHROCYTE [DISTWIDTH] IN BLOOD BY AUTOMATED COUNT: 15.8 % (ref 11.5–14.5)
EST. GFR  (AFRICAN AMERICAN): 5.7 ML/MIN/1.73 M^2
EST. GFR  (NON AFRICAN AMERICAN): 5 ML/MIN/1.73 M^2
GLUCOSE SERPL-MCNC: 110 MG/DL (ref 70–110)
GLUCOSE SERPL-MCNC: 120 MG/DL (ref 70–110)
GLUCOSE SERPL-MCNC: 170 MG/DL (ref 70–110)
GLUCOSE SERPL-MCNC: 219 MG/DL (ref 70–110)
GLUCOSE SERPL-MCNC: 219 MG/DL (ref 70–110)
GRAM STN SPEC: NORMAL
GRAM STN SPEC: NORMAL
HCT VFR BLD AUTO: 30.7 % (ref 37–48.5)
HGB BLD-MCNC: 9.6 G/DL (ref 12–16)
IMM GRANULOCYTES # BLD AUTO: ABNORMAL K/UL (ref 0–0.04)
IMM GRANULOCYTES NFR BLD AUTO: ABNORMAL % (ref 0–0.5)
LYMPHOCYTES NFR BLD: 12 % (ref 18–48)
MCH RBC QN AUTO: 28.3 PG (ref 27–31)
MCHC RBC AUTO-ENTMCNC: 31.3 G/DL (ref 32–36)
MCV RBC AUTO: 91 FL (ref 82–98)
METAMYELOCYTES NFR BLD MANUAL: 2 %
MONOCYTES NFR BLD: 5 % (ref 4–15)
MYELOCYTES NFR BLD MANUAL: 1 %
NEUTROPHILS NFR BLD: 75 % (ref 38–73)
NEUTS BAND NFR BLD MANUAL: 5 %
NRBC BLD-RTO: 0 /100 WBC
PLATELET # BLD AUTO: 450 K/UL (ref 150–450)
PMV BLD AUTO: 9.7 FL (ref 9.2–12.9)
POTASSIUM SERPL-SCNC: 4.5 MMOL/L (ref 3.5–5.1)
RBC # BLD AUTO: 3.39 M/UL (ref 4–5.4)
SODIUM SERPL-SCNC: 133 MMOL/L (ref 136–145)
WBC # BLD AUTO: 20.76 K/UL (ref 3.9–12.7)

## 2022-05-15 PROCEDURE — 80048 BASIC METABOLIC PNL TOTAL CA: CPT | Performed by: NURSE PRACTITIONER

## 2022-05-15 PROCEDURE — 85027 COMPLETE CBC AUTOMATED: CPT | Performed by: NURSE PRACTITIONER

## 2022-05-15 PROCEDURE — 25000003 PHARM REV CODE 250: Performed by: INTERNAL MEDICINE

## 2022-05-15 PROCEDURE — 86140 C-REACTIVE PROTEIN: CPT | Performed by: INTERNAL MEDICINE

## 2022-05-15 PROCEDURE — 97530 THERAPEUTIC ACTIVITIES: CPT

## 2022-05-15 PROCEDURE — 63600175 PHARM REV CODE 636 W HCPCS: Performed by: INTERNAL MEDICINE

## 2022-05-15 PROCEDURE — 12000002 HC ACUTE/MED SURGE SEMI-PRIVATE ROOM

## 2022-05-15 PROCEDURE — 25000003 PHARM REV CODE 250: Performed by: PODIATRIST

## 2022-05-15 PROCEDURE — 36415 COLL VENOUS BLD VENIPUNCTURE: CPT | Performed by: NURSE PRACTITIONER

## 2022-05-15 PROCEDURE — 25000003 PHARM REV CODE 250: Performed by: NURSE PRACTITIONER

## 2022-05-15 PROCEDURE — 82962 GLUCOSE BLOOD TEST: CPT

## 2022-05-15 PROCEDURE — 85007 BL SMEAR W/DIFF WBC COUNT: CPT | Performed by: NURSE PRACTITIONER

## 2022-05-15 RX ORDER — AMLODIPINE BESYLATE 5 MG/1
10 TABLET ORAL DAILY
Status: DISCONTINUED | OUTPATIENT
Start: 2022-05-16 | End: 2022-05-19 | Stop reason: HOSPADM

## 2022-05-15 RX ORDER — LOSARTAN POTASSIUM 50 MG/1
100 TABLET ORAL DAILY
Status: DISCONTINUED | OUTPATIENT
Start: 2022-05-15 | End: 2022-05-19 | Stop reason: HOSPADM

## 2022-05-15 RX ORDER — HYDRALAZINE HYDROCHLORIDE 20 MG/ML
5 INJECTION INTRAMUSCULAR; INTRAVENOUS ONCE
Status: COMPLETED | OUTPATIENT
Start: 2022-05-15 | End: 2022-05-15

## 2022-05-15 RX ORDER — HYDRALAZINE HYDROCHLORIDE 20 MG/ML
10 INJECTION INTRAMUSCULAR; INTRAVENOUS EVERY 4 HOURS PRN
Status: DISCONTINUED | OUTPATIENT
Start: 2022-05-15 | End: 2022-05-19 | Stop reason: HOSPADM

## 2022-05-15 RX ADMIN — ASPIRIN 81 MG: 81 TABLET, COATED ORAL at 09:05

## 2022-05-15 RX ADMIN — HYDRALAZINE HYDROCHLORIDE 5 MG: 20 INJECTION INTRAMUSCULAR; INTRAVENOUS at 10:05

## 2022-05-15 RX ADMIN — FLUTICASONE PROPIONATE 100 MCG: 50 SPRAY, METERED NASAL at 09:05

## 2022-05-15 RX ADMIN — BENZONATATE 100 MG: 100 CAPSULE ORAL at 09:05

## 2022-05-15 RX ADMIN — CALCIUM ACETATE 667 MG: 667 CAPSULE ORAL at 12:05

## 2022-05-15 RX ADMIN — ACETAMINOPHEN 650 MG: 325 TABLET, FILM COATED ORAL at 12:05

## 2022-05-15 RX ADMIN — CALCIUM ACETATE 667 MG: 667 CAPSULE ORAL at 05:05

## 2022-05-15 RX ADMIN — INSULIN ASPART 2 UNITS: 100 INJECTION, SOLUTION INTRAVENOUS; SUBCUTANEOUS at 12:05

## 2022-05-15 RX ADMIN — OXYCODONE HYDROCHLORIDE AND ACETAMINOPHEN 500 MG: 500 TABLET ORAL at 09:05

## 2022-05-15 RX ADMIN — AZELASTINE HYDROCHLORIDE 137 MCG: 137 SPRAY, METERED NASAL at 08:05

## 2022-05-15 RX ADMIN — INSULIN ASPART 2 UNITS: 100 INJECTION, SOLUTION INTRAVENOUS; SUBCUTANEOUS at 08:05

## 2022-05-15 RX ADMIN — HYDROCODONE BITARTRATE AND ACETAMINOPHEN 1 TABLET: 10; 325 TABLET ORAL at 08:05

## 2022-05-15 RX ADMIN — CETIRIZINE HYDROCHLORIDE 10 MG: 10 TABLET, FILM COATED ORAL at 09:05

## 2022-05-15 RX ADMIN — AZELASTINE HYDROCHLORIDE 137 MCG: 137 SPRAY, METERED NASAL at 09:05

## 2022-05-15 RX ADMIN — LOSARTAN POTASSIUM 100 MG: 50 TABLET, FILM COATED ORAL at 06:05

## 2022-05-15 RX ADMIN — INSULIN DETEMIR 20 UNITS: 100 INJECTION, SOLUTION SUBCUTANEOUS at 08:05

## 2022-05-15 RX ADMIN — INSULIN ASPART 1 UNITS: 100 INJECTION, SOLUTION INTRAVENOUS; SUBCUTANEOUS at 05:05

## 2022-05-15 RX ADMIN — THERA TABS 1 TABLET: TAB at 09:05

## 2022-05-15 RX ADMIN — CALCIUM ACETATE 667 MG: 667 CAPSULE ORAL at 09:05

## 2022-05-15 NOTE — PT/OT/SLP PROGRESS
Physical Therapy Treatment    Patient Name:  Leanna García   MRN:  [id number]    Recommendations:     Discharge Recommendations:  nursing facility, skilled   Discharge Equipment Recommendations: other (see comments) (TBD at next level of care)   Barriers to discharge: increase assist with mobility    Assessment:     Leanna García is a 56 y.o. female admitted with a medical diagnosis of Bacteremia.  She presents with the following impairments/functional limitations:  weakness, impaired endurance, impaired self care skills, impaired functional mobilty, gait instability, impaired balance, decreased safety awareness, decreased lower extremity function, pain, impaired cardiopulmonary response to activity.    Pt reports improved pain management since previous date. Improvement in bed mobility from min A to supervision. Good sitting balance at EOB with supervision. Sit to stand x 3 trials with mod A/min A x 2 to RW with standing tolerance from 30-60 secs. Good maintenance of NWB on LLE throughout mobility this date.    Rehab Prognosis: Fair; patient would benefit from acute skilled PT services to address these deficits and reach maximum level of function.    Recent Surgery: Procedure(s) (LRB):  INCISION AND DRAINAGE, FOOT (Left) 2 Days Post-Op    Plan:     During this hospitalization, patient to be seen 6 x/week to address the identified rehab impairments via gait training, therapeutic activities, therapeutic exercises, neuromuscular re-education and progress toward the following goals:    · Plan of Care Expires:  06/15/22    Subjective     Chief Complaint: none  Patient/Family Comments/goals: go to rehab  Pain/Comfort:  · Pain Rating 1: 7/10  · Location - Side 1: Left  · Location 1: foot  · Pain Addressed 1: Reposition, Distraction, Cessation of Activity      Objective:     Communicated with RN prior to session.  Patient found HOB elevated with telemetry, peripheral IV, wound vac upon PT entry to room.     General  Precautions: Standard, fall   Orthopedic Precautions:LLE non weight bearing   Braces: N/A  Respiratory Status: Room air     Functional Mobility:  · Bed Mobility:     · Supine to Sit: supervision  · Sit to Supine: supervision  · Transfers:     · Sit to Stand:  moderate assistance with rolling walker      AM-PAC 6 CLICK MOBILITY          Therapeutic Activities and Exercises:   Pt educated on POC, discharge recommendation, importance of time OOB, proper form with transfers, NWB LLE, need for assist with mobility, upright posture in static standing, use of call bell to seek assistance as needed and fall prevention      Patient left HOB elevated with all lines intact, call button in reach and bed alarm on..    GOALS:   Multidisciplinary Problems     Physical Therapy Goals        Problem: Physical Therapy    Goal Priority Disciplines Outcome Goal Variances Interventions   Physical Therapy Goal     PT, PT/OT Ongoing, Progressing     Description: Goals to be met by: 22     Patient will increase functional independence with mobility by performin. Supine to sit with Modified [place]  2. Sit to supine with Modified [place]  3. Sit to stand transfer with Contact Guard Assistance  4. Bed to chair transfer with Contact Guard Assistance using Rolling Walker maintaining NWB L LE  5. Wheelchair propulsion x150 feet with Supervision using bilateral uppper extremities                     Time Tracking:     PT Received On: 05/15/22  PT Start Time: 939     PT Stop Time: 955  PT Total Time (min): 16 min     Billable Minutes: Therapeutic Activity 16    Treatment Type: Treatment  PT/PTA: PT     PTA Visit Number: 0     05/15/2022

## 2022-05-15 NOTE — PROGRESS NOTES
[facility] Medicine  Progress Note    Patient Name: Leanna García  MRN: [id number]  Patient Class: IP- Inpatient   Admission Date: 5/4/2022  Length of Stay: 11 days  Attending Physician: Usman Stacy MD  Primary Care Provider: Stefano Lopez MD        Subjective:     Principal Problem:Bacteremia        HPI:  Ms. García is a 56-year-old female with a past medical history of ESRD on HD Monday Wednesday Friday, hypertension, IDDM2, PAF, and chronic cough who presents today with complaints of fever up to 103. It is severe.  It is associated with cough, posttussive vomiting, fatigue, sinus congestion, weakness, and a left foot ulcer.  She denies chest pain, diarrhea, dizziness, loss of consciousness.  In the ED she was noted to have a large left foot ulceration at the plantar surface and on the side of the foot.  CT of abdomen pelvis also reveals possible enteritis and bilateral perinephric fat stranding.  WBCs 21 K, troponin is 0.4 in the setting of ESRD.  She did not go to dialysis today she felt too bad.  Glucose is 446, anion gap 20, bicarb 24.       Overview/Hospital Course:  05/05  Had HD today  MRI r/o osteomyelitis  Afebrile now    05/06  Had febrile episodes overnight  Blood cultures [place] [facility] [place] [facility] [place]  Had dialysis today  Awaiting WBC scan     05/07  WBC scan showed medial left foot infection  C/o extreme fatigue/weakness    05/08  Pt having on and off febrile episodes at night  Pt today had bedside debridement of L foot abscess     05/09  Pt had HD today  Febrile episodes eprsists  Receive done unit of blood     05/10  Overall much better except for constipation  Awaiting insurance approval for LTAC placement   Otherwise stable     05/11  LTAC stay denied by insurance  Insurance approved SNF placement  Medically stable     05/12  Pt still having febrile episodes  WBC levels high  Pt c/o pain on R foot area    05/13  Pt today had Incision and drainage below fascia of multiple  abscesses left foot  Postoperatively drowsy   Had HD today     05/14  No new issues  Awaiting SNF placement     05/15  Awaiting SNF placement  Medically stable        Interval History:     Review of Systems   Constitutional:  Negative for activity change and appetite change.   HENT:  Negative for congestion and dental problem.    Eyes:  Negative for discharge and itching.   Respiratory:  Negative for shortness of breath.    Cardiovascular:  Negative for chest pain.   Gastrointestinal:  Negative for abdominal distention and abdominal pain.   Endocrine: Negative for cold intolerance.   Genitourinary:  Negative for difficulty urinating and dysuria.   Musculoskeletal:  Negative for arthralgias and back pain.   Skin:  Negative for color change.   Neurological:  Negative for dizziness and facial asymmetry.   Hematological:  Negative for adenopathy.   Psychiatric/Behavioral:  Negative for agitation and behavioral problems.    Objective:     Vital Signs (Most Recent):  Temp: 98.2 °F (36.8 °C) (05/15/22 1712)  Pulse: 66 (05/15/22 1712)  Resp: 18 (05/15/22 1712)  BP: (!) 174/69 (05/15/22 1725)  SpO2: 95 % (05/15/22 1712)   Vital Signs (24h Range):  Temp:  [97.9 °F (36.6 °C)-98.5 °F (36.9 °C)] 98.2 °F (36.8 °C)  Pulse:  [49-77] 66  Resp:  [18] 18  SpO2:  [94 %-99 %] 95 %  BP: (131-205)/(53-86) 174/69     Weight: 90.2 kg (198 lb 13.7 oz)  Body mass index is 31.15 kg/m².    Intake/Output Summary (Last 24 hours) at 5/15/2022 1857  Last data filed at 5/15/2022 1800  Gross per 24 hour   Intake 1330 ml   Output --   Net 1330 ml      Physical Exam  Vitals and nursing note reviewed.   Constitutional:       General: She is not in acute distress.  HENT:      Head: Atraumatic.      Right Ear: External ear normal.      Left Ear: External ear normal.      Nose: Nose normal.      Mouth/Throat:      Mouth: Mucous membranes are moist.   Eyes:      General: No scleral icterus.  Cardiovascular:      Rate and Rhythm: Normal rate.   Pulmonary:       Effort: Pulmonary effort is normal.   Abdominal:      General: There is no distension.   Musculoskeletal:         General: Normal range of motion.      Cervical back: Normal range of motion.      Comments: L foot bandage intact   Skin:     General: Skin is warm.   Neurological:      Mental Status: She is alert and oriented to person, place, and time.   Psychiatric:         Behavior: Behavior normal.       Significant Labs: All pertinent labs within the past 24 hours have been reviewed.  CBC:   Recent Labs   Lab 05/14/22  0554 05/15/22  0439   WBC 24.34* 20.76*   HGB 9.9* 9.6*   HCT 32.1* 30.7*    450     CMP:   Recent Labs   Lab 05/14/22  0554 05/15/22  0439   * 133*   K 4.5 4.5   CL 97 94*   CO2 25 19*   GLU 95 120*   BUN 68* 88*   CREATININE 6.4* 8.2*   CALCIUM 9.8 9.4   ANIONGAP 13 20*   EGFRNONAA 6.7* 5.0*       Significant Imaging: I have reviewed all pertinent imaging results/findings within the past 24 hours.      Assessment/Plan:      * Bacteremia  Gram positive rods on BC X 1 :Bacillus cereus   Had enteritis/Food poisoning which has been resolved(ate chinese food before onset of symptoms)  Repeat Blood cultures so far normal  S/p Incision/drainage/debridement of  Left foot deep tissue abscess below fascia muscle and tendon  Again had Incision and drainage below fascia of multiple abscesses left foot on 05/13  Continue Daptomycin 500 mg IV q48h for 5 weeks  Medically stable to go to SNF     Sepsis  Bacillus cereus bacteremia along with L foot abscess       Abscess of left foot  S/p Incision/drainage/debridement of  Left foot deep tissue abscess below fascia muscle and tendon  Again had Incision and drainage below fascia of multiple abscesses left foot on 05/13      Diabetic ulcer of left midfoot  Wound care  MRI r/o osteomyelitis  WBC scan showed medial left foot as source of infection  S/p Incision/drainage/debridement of  Left foot deep tissue abscess below fascia muscle and tendon  Again  had Incision and drainage below fascia of multiple abscesses left foot on 05/13    Discharge planning issues  LTAC stay denied by insurance  Awaiting SNF placement and pt is medically stable       Chronic anemia  pRBC if needed along with Dialysis sessions  Received one unit of blood on 05/09      Diabetic foot infection  As above         Foot infection  As above       Cough  Symptomatic management      Acute bronchitis  Symptomatic management      Elevated troponin  Likely demand ischemia in the setting of ESRD        Hypokalemia  Stable     Enteritis  Resolved     Type 2 diabetes mellitus with kidney complication, with long-term current use of insulin  Maintain present regime       ESRD (end stage renal disease)  HD sessions as scheduled         VTE Risk Mitigation (From admission, onward)         Ordered     heparin (porcine) injection 5,000 Units  As needed (PRN)         05/10/22 1541     IP VTE HIGH RISK PATIENT  Once         05/05/22 0003     Place sequential compression device  Until discontinued         05/05/22 0003                Discharge Planning   ERI: 5/16/2022     Code Status: Full Code   Is the patient medically ready for discharge?: No    Reason for patient still in hospital (select all that apply): Pending disposition  Discharge Plan A: Skilled Nursing Facility   Discharge Delays: (!) Change in Medical Condition              Usman Stacy MD  Department of Hospital Medicine   Former[facility]

## 2022-05-15 NOTE — PROGRESS NOTES
INPATIENT NEPHROLOGY Progress Note  [facility] NEPHROLOGY    Leanna García  05/15/2022    Reason for consultation:    ESRD    Chief Complaint:   Chief Complaint   Patient presents with    Fever          History of Present Illness:    Per H and P    Ms. García is a 56-year-old female with a past medical history of ESRD on HD Monday Wednesday Friday, hypertension, IDDM2, PAF, and chronic cough who presents today with complaints of fever up to 103. It is severe.  It is associated with cough, posttussive vomiting, fatigue, sinus congestion, weakness, and a left foot ulcer.  She denies chest pain, diarrhea, dizziness, loss of consciousness.  In the ED she was noted to have a large left foot ulceration at the plantar surface and on the side of the foot.  CT of abdomen pelvis also reveals possible enteritis and bilateral perinephric fat stranding.  WBCs 21 K, troponin is 0.4 in the setting of ESRD.  She did not go to dialysis today she felt too bad.  Glucose is 446, anion gap 20, bicarb 24.     5/5  C/o foot pain.  Mild dyspnea.  No vomiting, chest pain, urinary or bowel complaints.  Weak  5/6  Currently getting wound vac placed.  Has foot pain. No respiratory distress  5/7 still spiking fevers.  Foot pain.  Stopped hd early yesterday  5/8  Tired today.  No vomiting or sob.  Tmax 101.9 at 1900 yesterday  5/9 wound vac today  5/10 no issues with HD yest- got 1u of blood- net UF 2.6L- waiting for wound vac- gave ok with PICC  5/11 febrile, BP stable, on 2L NC- seen on HD- no complaints; has wound vac; looking to [facility] at [facility]  5/12 plan for [facility]  5/13 went for I&D of L foot abscesses   5/14 no events overnight  5/15 no events overnight- awaiting SNF    Plan of Care:     ESRD on HD MWF  --continue dialysis per routine    Hypertension  --low salt diet 2g/day  --uf with hd    Hyponatremia  --fluid restrict 1.5L/day  --140 Na dialysate    SHPT  --continue binder with meals     Anemia of CKD  --Hb is better  after transfusion on 5/9- stabilizing  --continue FERMÍN with HD    Diab Foot Ulcer- MRSA  --podiatry following, getting procedures PRN  --wound vac in place  --dose antbx for CrCl< 10/HD    Thank you for allowing us to participate in this patient's care. We will continue to follow.    Vital Signs:  Temp Readings from Last 3 Encounters:   05/15/22 98.1 °F (36.7 °C) (Oral)   04/26/22 98.2 °F (36.8 °C)   03/10/22 97.3 °F (36.3 °C) (Tympanic)       Pulse Readings from Last 3 Encounters:   05/15/22 77   04/26/22 66   04/05/22 75       BP Readings from Last 3 Encounters:   05/15/22 (!) 176/86   04/28/22 (!) 180/82   04/26/22 (!) 174/66       Weight:  Wt Readings from Last 3 Encounters:   05/15/22 90.2 kg (198 lb 13.7 oz)   05/06/22 86.6 kg (191 lb)   04/28/22 94 kg (207 lb 5.5 oz)       Medications:  No current facility-administered medications on file prior to encounter.     Current Outpatient Medications on File Prior to Encounter   Medication Sig Dispense Refill    ascorbic acid, vitamin C, (VITAMIN C) 500 MG tablet Take 500 mg by mouth once daily.      aspirin (ECOTRIN) 81 MG EC tablet Take 81 mg by mouth once daily.      atorvastatin (LIPITOR) 80 MG tablet Take 1 tablet (80 mg total) by mouth once daily. (Patient taking differently: Take 80 mg by mouth every evening.) 90 tablet 3    blood sugar diagnostic Strp To check BG 4 times daily, to use with insurance preferred meter (Patient taking differently: 1 strip by Misc.(Non-Drug; Combo Route) route 4 (four) times daily. To check BG 4 times daily, to use with insurance preferred meter) 450 strip 3    insulin aspart U-100 (NOVOLOG FLEXPEN U-100 INSULIN) 100 unit/mL (3 mL) InPn pen Inject 5-8 units 3 times per day with food. 1 Box 6    insulin detemir U-100 (LEVEMIR FLEXTOUCH U-100 INSULN) 100 unit/mL (3 mL) InPn pen Inject 15-18 Units into the skin once daily. 1 Box 6    lancets Misc To use to check blood sugar 4 times daily. To use with insurance approved meter.  "Patient needs the round, purple one (Patient taking differently: 1 lancet by Misc.(Non-Drug; Combo Route) route 4 (four) times daily. To use to check blood sugar 4 times daily. To use with insurance approved meter. Patient needs the round, purple one) 450 each 3    metoprolol tartrate (LOPRESSOR) 25 MG tablet Take 25 mg by mouth 2 (two) times daily.      minoxidiL (LONITEN) 2.5 MG tablet Take 5 mg by mouth 2 (two) times daily.      multivitamin (THERAGRAN) per tablet Take 1 tablet by mouth once daily.      pen needle, diabetic (BD ULTRA-FINE ROBERT PEN NEEDLE) 32 gauge x 5/32" Ndle To use 4 times per day with insulin injections. (Patient taking differently: 1 pen by Misc.(Non-Drug; Combo Route) route 4 (four) times daily. To use 4 times per day with insulin injections.) 450 each 2    sucroferric oxyhydroxide (VELPHORO) 500 mg Chew Take 500 mg by mouth 3 (three) times daily.      dextrose (GLUCOSE GEL) 40 % gel Take 37.5 mLs (15,000 mg total) by mouth once as needed (hypoglycemia). 37.5 g 4    gabapentin (NEURONTIN) 300 MG capsule Take 300 mg by mouth every evening.      glucagon (BAQSIMI) 3 mg/actuation Spry 3 mg (one actuation) into a single nostril; if no response, may repeat in 15 minutes using a new intranasal device. (Patient taking differently: 3 mg by Left Nostril route as needed. 3 mg (one actuation) into a single nostril; if no response, may repeat in 15 minutes using a new intranasal device.) 2 each 1    [DISCONTINUED] blood-glucose meter (ACCU-CHEK KAYLIE PLUS METER) Misc To use to check blood sugars 4 times a day 1 each 0    [DISCONTINUED] clorazepate (TRANXENE) 3.75 MG Tab Take 7.5 mg by mouth nightly as needed.       [DISCONTINUED] fenofibrate 160 MG Tab Take 1 tablet (160 mg total) by mouth once daily. 90 tablet 3    [DISCONTINUED] lancing device with lancets (ACCU-CHEK SOFT DEV LANCETS) Kit To check blood sugars 4 times per day 400 each 3     Scheduled Meds:   ascorbic acid (vitamin C)  500 " "mg Oral Daily    aspirin  81 mg Oral Daily    azelastine  1 spray Nasal BID    calcium acetate(phosphat bind)  667 mg Oral TID WM    cetirizine  10 mg Oral Every other day    collagenase   Topical (Top) Daily    DAPTOmycin (CUBICIN)  IV  500 mg Intravenous Q48H    epoetin chris-epbx  100 Units/kg Intravenous Every Mon, Wed, Fri    fluticasone propionate  2 spray Each Nostril Daily    insulin detemir U-100  20 Units Subcutaneous QHS    multivitamin  1 tablet Oral Daily     Continuous Infusions:  PRN Meds:.sodium chloride, sodium chloride 0.9%, sodium chloride 0.9%, acetaminophen, benzonatate, dextrose 50%, dextrose 50%, heparin (porcine), HYDROcodone-acetaminophen, HYDROcodone-acetaminophen, HYDROcodone-acetaminophen, insulin aspart U-100, naloxone, ondansetron, ondansetron, polyethylene glycol, promethazine, sodium chloride 0.9%, sodium chloride 0.9%, sodium chloride 0.9%, sodium chloride 0.9%    Review of Systems:  Off floor    Physical Exam:    BP (!) 176/86   Pulse 77   Temp 98.1 °F (36.7 °C) (Oral)   Resp 18   Ht 5' 7" (1.702 m)   Wt 90.2 kg (198 lb 13.7 oz)   SpO2 97%   BMI 31.15 kg/m²     Off floor    Results:  Lab Results   Component Value Date     (L) 05/15/2022    K 4.5 05/15/2022    CL 94 (L) 05/15/2022    CO2 19 (L) 05/15/2022    BUN 88 (H) 05/15/2022    CREATININE 8.2 (H) 05/15/2022    CALCIUM 9.4 05/15/2022    ANIONGAP 20 (H) 05/15/2022    ESTGFRAFRICA 5.7 (A) 05/15/2022    EGFRNONAA 5.0 (A) 05/15/2022       Lab Results   Component Value Date    CALCIUM 9.4 05/15/2022    PHOS 3.3 05/10/2022       Recent Labs   Lab 05/15/22  0439   WBC 20.76*   RBC 3.39*   HGB 9.6*   HCT 30.7*      MCV 91   MCH 28.3   MCHC 31.3*        Imaging Results          X-Ray Foot Complete Left (Final result)  Result time 05/05/22 06:12:52    Final result by Denise Estrada MD (05/05/22 06:12:52)                 Narrative:    Three views of the left foot are compared to prior study dated " 12/3/2021    Clinical history is infection    There is osteopenia. There has been prior amputation of the fourth digit at the base of the proximal phalanx.    There are moderate degenerative changes of the midfoot and hindfoot with joint space narrowing and spurring.. There are no fractures, dislocations or osteolysis. There is a subcutaneous ulceration the mid plantar soft tissues. There is moderate peripheral vascular calcification.    IMPRESSION: Prior amputation of fourth digit without radiographic evidence for osteomyelitis.    Moderate degenerative changes of the foot    16mm soft tissue ulceration in the mid plantar medial soft tissues    Electronically signed by:  Denise Estrada MD  5/5/2022 6:12 AM CDT Workstation: [id number]                             CT Abdomen Pelvis  Without Contrast (Final result)  Result time 05/04/22 21:53:02    Final result by Ernesto Lino MD (05/04/22 21:53:02)                 Narrative:    EXAM: CT ABDOMEN PELVIS WITHOUT CONTRAST    RadLex: CT ABDOMEN PELVIS WITHOUT IV CONTRAST    HISTORY: 56 years  Female;  Abdominal pain, acute, nonlocalized;    TECHNIQUE:   Standard CT of the abdomen and pelvis was performed without the use of intravenous contrast media. Lack of intravenous contrast limits evaluation of soft tissue structures in this study.    CT scans at this facility use dose modulation, iterative reconstruction and/or weight based dosing when appropriate to reduce radiation dose to as low as reasonably achievable.    COMPARISON: None available    FINDING(S):    ABDOMEN:    Lung bases show dependent atelectasis. Coronary artery calcifications are seen. Cardiac size normal. Mild thickening of the distal esophagus with small hiatal hernia.    Small amount of fluid is seen in the stomach. Mild thickening of the stomach lining.    Mild hepatic steatosis. Spleen, pancreas, and both adrenals are normal.    There is gallbladder distention with multiple gallstones.    Neither  kidney shows hydronephrosis. There is moderate bilateral perinephric stranding which appears above expected and may represent inflammation. Correlate with urinalysis to exclude the possibility of pyelonephritis.    There are vascular calcifications involving both kidneys. There may be separate small stones as well. The ureters themselves are decompressed.    Marked atherosclerosis affects the aorta and the major branch vessels. No abdominal lymphadenopathy or free abdominal fluid.    The small intestines contain a small to moderate amount of fluid with scattered air-fluid levels. No transition point to suggest obstruction.    No evidence of appendicitis. Oral contrast is seen in the intestines. There is a mild to moderate stool burden. Scattered colonic diverticulosis.    PELVIS:    Bladder is decompressed. No free fluid within the pelvis. There is an enlarged left external iliac lymph node measuring 1.6 x 0.8 cm on series 3, image 210.    No other groin or pelvis lymphadenopathy. A small left inguinal hernia contains only fat.    Small umbilical hernia contains only fat.    There are degenerative changes in the lower lumbar spine.    IMPRESSION:    1.  Small to moderate amount of fluid in the small intestines with scattered air fluid levels, possibly enteritis  2.  Moderate bilateral perinephric stranding. Correlate with urinalysis to exclude the possibility of pyelonephritis  3.  Gallbladder distention with gallstones    Electronically signed by:  Ernesto Lino MD  5/4/2022 9:53 PM CDT Workstation: [id number]                             X-Ray Chest 1 View (Final result)  Result time 05/05/22 06:10:56    Final result by Denise Estrada MD (05/05/22 06:10:56)                 Narrative:    XR CHEST 1 VIEW    CLINICAL HISTORY:  56 years Female pain    COMPARISON: 5/19/2021    FINDINGS: Cardiomediastinal silhouette is within normal limits. Lungs are normally expanded with no airspace consolidation. No pleural  effusion or pneumothorax. No acute osseous abnormality.    IMPRESSION: No acute pulmonary process.    Electronically signed by:  Denise Estrada MD  5/5/2022 6:10 AM CDT Workstation: [id number]                                I have personally reviewed pertinent radiological imaging and reports.     Patient care was time spent personally by me on the following activities: > 35 min  · Obtaining a history  · Examination of patient.  · Providing medical care at the patients bedside.  · Developing a treatment plan with patient or surrogate and bedside caregivers  · Ordering and reviewing laboratory studies, radiographic studies, pulse oximetry.  · Ordering and performing treatments and interventions.  · Evaluation of patient's response to treatment.  · Discussions with consultants while on the unit and immediately available to the patient.  · Re-evaluation of the patient's condition.  · Documentation in the medical record.     Ashleigh Soria MD MPH  [facility]  [address] [place] [id number]  [phone] (p)  [phone] (f)

## 2022-05-15 NOTE — PLAN OF CARE
Problem: Adult Inpatient Plan of Care  Goal: Absence of Hospital-Acquired Illness or Injury  Outcome: Ongoing, Progressing  Goal: Optimal Comfort and Wellbeing  Outcome: Ongoing, Progressing     Problem: Diabetes Comorbidity  Goal: Blood Glucose Level Within Targeted Range  Outcome: Ongoing, Progressing     Problem: Glycemic Control Impaired (Sepsis/Septic Shock)  Goal: Blood Glucose Level Within Desired Range  Outcome: Ongoing, Progressing

## 2022-05-15 NOTE — SUBJECTIVE & OBJECTIVE
Interval History:     Review of Systems   Constitutional:  Negative for activity change and appetite change.   HENT:  Negative for congestion and dental problem.    Eyes:  Negative for discharge and itching.   Respiratory:  Negative for shortness of breath.    Cardiovascular:  Negative for chest pain.   Gastrointestinal:  Negative for abdominal distention and abdominal pain.   Endocrine: Negative for cold intolerance.   Genitourinary:  Negative for difficulty urinating and dysuria.   Musculoskeletal:  Negative for arthralgias and back pain.   Skin:  Negative for color change.   Neurological:  Negative for dizziness and facial asymmetry.   Hematological:  Negative for adenopathy.   Psychiatric/Behavioral:  Negative for agitation and behavioral problems.    Objective:     Vital Signs (Most Recent):  Temp: 98.2 °F (36.8 °C) (05/15/22 1712)  Pulse: 66 (05/15/22 1712)  Resp: 18 (05/15/22 1712)  BP: (!) 174/69 (05/15/22 1725)  SpO2: 95 % (05/15/22 1712)   Vital Signs (24h Range):  Temp:  [97.9 °F (36.6 °C)-98.5 °F (36.9 °C)] 98.2 °F (36.8 °C)  Pulse:  [49-77] 66  Resp:  [18] 18  SpO2:  [94 %-99 %] 95 %  BP: (131-205)/(53-86) 174/69     Weight: 90.2 kg (198 lb 13.7 oz)  Body mass index is 31.15 kg/m².    Intake/Output Summary (Last 24 hours) at 5/15/2022 1857  Last data filed at 5/15/2022 1800  Gross per 24 hour   Intake 1330 ml   Output --   Net 1330 ml      Physical Exam  Vitals and nursing note reviewed.   Constitutional:       General: She is not in acute distress.  HENT:      Head: Atraumatic.      Right Ear: External ear normal.      Left Ear: External ear normal.      Nose: Nose normal.      Mouth/Throat:      Mouth: Mucous membranes are moist.   Eyes:      General: No scleral icterus.  Cardiovascular:      Rate and Rhythm: Normal rate.   Pulmonary:      Effort: Pulmonary effort is normal.   Abdominal:      General: There is no distension.   Musculoskeletal:         General: Normal range of motion.      Cervical  back: Normal range of motion.      Comments: L foot bandage intact   Skin:     General: Skin is warm.   Neurological:      Mental Status: She is alert and oriented to person, place, and time.   Psychiatric:         Behavior: Behavior normal.       Significant Labs: All pertinent labs within the past 24 hours have been reviewed.  CBC:   Recent Labs   Lab 05/14/22  0554 05/15/22  0439   WBC 24.34* 20.76*   HGB 9.9* 9.6*   HCT 32.1* 30.7*    450     CMP:   Recent Labs   Lab 05/14/22  0554 05/15/22  0439   * 133*   K 4.5 4.5   CL 97 94*   CO2 25 19*   GLU 95 120*   BUN 68* 88*   CREATININE 6.4* 8.2*   CALCIUM 9.8 9.4   ANIONGAP 13 20*   EGFRNONAA 6.7* 5.0*       Significant Imaging: I have reviewed all pertinent imaging results/findings within the past 24 hours.

## 2022-05-16 LAB
ANION GAP SERPL CALC-SCNC: 20 MMOL/L (ref 8–16)
BACTERIA SPEC ANAEROBE CULT: NORMAL
BASOPHILS NFR BLD: 0 % (ref 0–1.9)
BUN SERPL-MCNC: 117 MG/DL (ref 6–20)
CALCIUM SERPL-MCNC: 9.4 MG/DL (ref 8.7–10.5)
CHLORIDE SERPL-SCNC: 90 MMOL/L (ref 95–110)
CK SERPL-CCNC: 28 U/L (ref 20–180)
CO2 SERPL-SCNC: 19 MMOL/L (ref 23–29)
CREAT SERPL-MCNC: 10.3 MG/DL (ref 0.5–1.4)
CRP SERPL-MCNC: 15.91 MG/DL
DIFFERENTIAL METHOD: ABNORMAL
EOSINOPHIL NFR BLD: 2 % (ref 0–8)
ERYTHROCYTE [DISTWIDTH] IN BLOOD BY AUTOMATED COUNT: 15.8 % (ref 11.5–14.5)
EST. GFR  (AFRICAN AMERICAN): 4.3 ML/MIN/1.73 M^2
EST. GFR  (NON AFRICAN AMERICAN): 3.8 ML/MIN/1.73 M^2
GLUCOSE SERPL-MCNC: 129 MG/DL (ref 70–110)
GLUCOSE SERPL-MCNC: 142 MG/DL (ref 70–110)
GLUCOSE SERPL-MCNC: 149 MG/DL (ref 70–110)
GLUCOSE SERPL-MCNC: 243 MG/DL (ref 70–110)
GLUCOSE SERPL-MCNC: 370 MG/DL (ref 70–110)
HCT VFR BLD AUTO: 31.9 % (ref 37–48.5)
HGB BLD-MCNC: 9.9 G/DL (ref 12–16)
IMM GRANULOCYTES # BLD AUTO: ABNORMAL K/UL (ref 0–0.04)
IMM GRANULOCYTES NFR BLD AUTO: ABNORMAL % (ref 0–0.5)
LYMPHOCYTES NFR BLD: 6 % (ref 18–48)
MCH RBC QN AUTO: 28 PG (ref 27–31)
MCHC RBC AUTO-ENTMCNC: 31 G/DL (ref 32–36)
MCV RBC AUTO: 90 FL (ref 82–98)
MONOCYTES NFR BLD: 5 % (ref 4–15)
MYELOCYTES NFR BLD MANUAL: 2 %
NEUTROPHILS NFR BLD: 81 % (ref 38–73)
NEUTS BAND NFR BLD MANUAL: 4 %
NRBC BLD-RTO: 0 /100 WBC
PLATELET # BLD AUTO: 504 K/UL (ref 150–450)
PLATELET BLD QL SMEAR: ABNORMAL
PMV BLD AUTO: 9.8 FL (ref 9.2–12.9)
POTASSIUM SERPL-SCNC: 4.8 MMOL/L (ref 3.5–5.1)
RBC # BLD AUTO: 3.54 M/UL (ref 4–5.4)
SODIUM SERPL-SCNC: 129 MMOL/L (ref 136–145)
WBC # BLD AUTO: 17.78 K/UL (ref 3.9–12.7)

## 2022-05-16 PROCEDURE — 63600175 PHARM REV CODE 636 W HCPCS: Mod: JG | Performed by: INTERNAL MEDICINE

## 2022-05-16 PROCEDURE — 97530 THERAPEUTIC ACTIVITIES: CPT

## 2022-05-16 PROCEDURE — 99900031 HC PATIENT EDUCATION (STAT)

## 2022-05-16 PROCEDURE — 63600175 PHARM REV CODE 636 W HCPCS: Performed by: INTERNAL MEDICINE

## 2022-05-16 PROCEDURE — 36415 COLL VENOUS BLD VENIPUNCTURE: CPT | Performed by: INTERNAL MEDICINE

## 2022-05-16 PROCEDURE — 25000003 PHARM REV CODE 250: Performed by: PODIATRIST

## 2022-05-16 PROCEDURE — 99232 PR SUBSEQUENT HOSPITAL CARE,LEVL II: ICD-10-PCS | Mod: ,,, | Performed by: PODIATRIST

## 2022-05-16 PROCEDURE — 94799 UNLISTED PULMONARY SVC/PX: CPT

## 2022-05-16 PROCEDURE — 85027 COMPLETE CBC AUTOMATED: CPT | Performed by: NURSE PRACTITIONER

## 2022-05-16 PROCEDURE — 99900035 HC TECH TIME PER 15 MIN (STAT)

## 2022-05-16 PROCEDURE — 25000003 PHARM REV CODE 250: Performed by: NURSE PRACTITIONER

## 2022-05-16 PROCEDURE — 85007 BL SMEAR W/DIFF WBC COUNT: CPT | Performed by: NURSE PRACTITIONER

## 2022-05-16 PROCEDURE — 94761 N-INVAS EAR/PLS OXIMETRY MLT: CPT

## 2022-05-16 PROCEDURE — 82550 ASSAY OF CK (CPK): CPT | Performed by: INTERNAL MEDICINE

## 2022-05-16 PROCEDURE — 99232 SBSQ HOSP IP/OBS MODERATE 35: CPT | Mod: ,,, | Performed by: PODIATRIST

## 2022-05-16 PROCEDURE — 86140 C-REACTIVE PROTEIN: CPT | Performed by: INTERNAL MEDICINE

## 2022-05-16 PROCEDURE — 90935 HEMODIALYSIS ONE EVALUATION: CPT

## 2022-05-16 PROCEDURE — 25000003 PHARM REV CODE 250: Performed by: INTERNAL MEDICINE

## 2022-05-16 PROCEDURE — 36415 COLL VENOUS BLD VENIPUNCTURE: CPT | Performed by: NURSE PRACTITIONER

## 2022-05-16 PROCEDURE — 80048 BASIC METABOLIC PNL TOTAL CA: CPT | Performed by: NURSE PRACTITIONER

## 2022-05-16 PROCEDURE — 12000002 HC ACUTE/MED SURGE SEMI-PRIVATE ROOM

## 2022-05-16 RX ADMIN — ASPIRIN 81 MG: 81 TABLET, COATED ORAL at 08:05

## 2022-05-16 RX ADMIN — HYDROCODONE BITARTRATE AND ACETAMINOPHEN 1 TABLET: 10; 325 TABLET ORAL at 11:05

## 2022-05-16 RX ADMIN — FLUTICASONE PROPIONATE 100 MCG: 50 SPRAY, METERED NASAL at 08:05

## 2022-05-16 RX ADMIN — DAPTOMYCIN 500 MG: 500 INJECTION, POWDER, LYOPHILIZED, FOR SOLUTION INTRAVENOUS at 10:05

## 2022-05-16 RX ADMIN — CALCIUM ACETATE 667 MG: 667 CAPSULE ORAL at 12:05

## 2022-05-16 RX ADMIN — INSULIN ASPART 5 UNITS: 100 INJECTION, SOLUTION INTRAVENOUS; SUBCUTANEOUS at 10:05

## 2022-05-16 RX ADMIN — EPOETIN ALFA-EPBX 9000 UNITS: 10000 INJECTION, SOLUTION INTRAVENOUS; SUBCUTANEOUS at 12:05

## 2022-05-16 RX ADMIN — LOSARTAN POTASSIUM 100 MG: 50 TABLET, FILM COATED ORAL at 08:05

## 2022-05-16 RX ADMIN — OXYCODONE HYDROCHLORIDE AND ACETAMINOPHEN 500 MG: 500 TABLET ORAL at 08:05

## 2022-05-16 RX ADMIN — INSULIN DETEMIR 20 UNITS: 100 INJECTION, SOLUTION SUBCUTANEOUS at 10:05

## 2022-05-16 RX ADMIN — CALCIUM ACETATE 667 MG: 667 CAPSULE ORAL at 05:05

## 2022-05-16 RX ADMIN — HYDROCODONE BITARTRATE AND ACETAMINOPHEN 1 TABLET: 10; 325 TABLET ORAL at 05:05

## 2022-05-16 RX ADMIN — AZELASTINE HYDROCHLORIDE 137 MCG: 137 SPRAY, METERED NASAL at 10:05

## 2022-05-16 RX ADMIN — CALCIUM ACETATE 667 MG: 667 CAPSULE ORAL at 08:05

## 2022-05-16 RX ADMIN — AZELASTINE HYDROCHLORIDE 137 MCG: 137 SPRAY, METERED NASAL at 08:05

## 2022-05-16 RX ADMIN — HYDROCODONE BITARTRATE AND ACETAMINOPHEN 1 TABLET: 10; 325 TABLET ORAL at 10:05

## 2022-05-16 RX ADMIN — HYDRALAZINE HYDROCHLORIDE 10 MG: 20 INJECTION INTRAMUSCULAR; INTRAVENOUS at 08:05

## 2022-05-16 RX ADMIN — HYDROCODONE BITARTRATE AND ACETAMINOPHEN 1 TABLET: 10; 325 TABLET ORAL at 08:05

## 2022-05-16 RX ADMIN — THERA TABS 1 TABLET: TAB at 08:05

## 2022-05-16 NOTE — PROGRESS NOTES
INPATIENT NEPHROLOGY Progress Note  [facility] NEPHROLOGY    Leanna García  05/16/2022    Reason for consultation:  ESRD    Chief Complaint:   Chief Complaint   Patient presents with    Fever     History of Present Illness:  Ms. García is a 56-year-old female with a past medical history of ESRD on HD Monday Wednesday Friday, hypertension, IDDM2, PAF, and chronic cough who presents today with complaints of fever up to 103. It is severe.  It is associated with cough, posttussive vomiting, fatigue, sinus congestion, weakness, and a left foot ulcer.  She denies chest pain, diarrhea, dizziness, loss of consciousness.  In the ED she was noted to have a large left foot ulceration at the plantar surface and on the side of the foot.  CT of abdomen pelvis also reveals possible enteritis and bilateral perinephric fat stranding.  WBCs 21 K, troponin is 0.4 in the setting of ESRD.  She did not go to dialysis today she felt too bad.  Glucose is 446, anion gap 20, bicarb 24.     5/5  C/o foot pain.  Mild dyspnea.  No vomiting, chest pain, urinary or bowel complaints.  Weak  5/6  Currently getting wound vac placed.  Has foot pain. No respiratory distress  5/7 still spiking fevers.  Foot pain.  Stopped hd early yesterday  5/8  Tired today.  No vomiting or sob.  Tmax 101.9 at 1900 yesterday  5/9 wound vac today  5/10 no issues with HD yest- got 1u of blood- net UF 2.6L- waiting for wound vac- gave ok with PICC  5/11 febrile, BP stable, on 2L NC- seen on HD- no complaints; has wound vac; looking to place at [facility]  5/12 plan for [facility]  5/13 went for I&D of L foot abscesses   5/14 no events overnight  5/15 no events overnight- awaiting SNF  5/16 VSS, no new complains. HD today.    Plan of Care:     ESRD on HD MWF  --continue dialysis per routine    Hypertension  --low salt diet 2g/day  --uf with hd    Hyponatremia  --fluid restrict 1.5L/day  --140 Na dialysate    Secondary HPT  --continue binder with meals     Anemia of  CKD  --Hgb is better after transfusion on 5/9- stabilizing  --continue FERMÍN with HD    Diab Foot Ulcer- MRSA  --podiatry following, getting procedures PRN  --wound vac in place  --dose antbx for CrCl< 10/HD    Thank you for allowing us to participate in this patient's care. We will continue to follow.    Vital Signs:  Temp Readings from Last 3 Encounters:   05/16/22 97.8 °F (36.6 °C) (Oral)   04/26/22 98.2 °F (36.8 °C)   03/10/22 97.3 °F (36.3 °C) (Tympanic)       Pulse Readings from Last 3 Encounters:   05/16/22 (!) 59   04/26/22 66   04/05/22 75       BP Readings from Last 3 Encounters:   05/16/22 (!) 145/65   04/28/22 (!) 180/82   04/26/22 (!) 174/66       Weight:  Wt Readings from Last 3 Encounters:   05/16/22 91.8 kg (202 lb 6.1 oz)   05/06/22 86.6 kg (191 lb)   04/28/22 94 kg (207 lb 5.5 oz)       Medications:  No current facility-administered medications on file prior to encounter.     Current Outpatient Medications on File Prior to Encounter   Medication Sig Dispense Refill    ascorbic acid, vitamin C, (VITAMIN C) 500 MG tablet Take 500 mg by mouth once daily.      aspirin (ECOTRIN) 81 MG EC tablet Take 81 mg by mouth once daily.      atorvastatin (LIPITOR) 80 MG tablet Take 1 tablet (80 mg total) by mouth once daily. (Patient taking differently: Take 80 mg by mouth every evening.) 90 tablet 3    blood sugar diagnostic Strp To check BG 4 times daily, to use with insurance preferred meter (Patient taking differently: 1 strip by Misc.(Non-Drug; Combo Route) route 4 (four) times daily. To check BG 4 times daily, to use with insurance preferred meter) 450 strip 3    insulin aspart U-100 (NOVOLOG FLEXPEN U-100 INSULIN) 100 unit/mL (3 mL) InPn pen Inject 5-8 units 3 times per day with food. 1 Box 6    insulin detemir U-100 (LEVEMIR FLEXTOUCH U-100 INSULN) 100 unit/mL (3 mL) InPn pen Inject 15-18 Units into the skin once daily. 1 Box 6    lancets Misc To use to check blood sugar 4 times daily. To use with  "insurance approved meter. Patient needs the round, purple one (Patient taking differently: 1 lancet by Misc.(Non-Drug; Combo Route) route 4 (four) times daily. To use to check blood sugar 4 times daily. To use with insurance approved meter. Patient needs the round, purple one) 450 each 3    metoprolol tartrate (LOPRESSOR) 25 MG tablet Take 25 mg by mouth 2 (two) times daily.      minoxidiL (LONITEN) 2.5 MG tablet Take 5 mg by mouth 2 (two) times daily.      multivitamin (THERAGRAN) per tablet Take 1 tablet by mouth once daily.      pen needle, diabetic (BD ULTRA-FINE ROBERT PEN NEEDLE) 32 gauge x 5/32" Ndle To use 4 times per day with insulin injections. (Patient taking differently: 1 pen by Misc.(Non-Drug; Combo Route) route 4 (four) times daily. To use 4 times per day with insulin injections.) 450 each 2    sucroferric oxyhydroxide (VELPHORO) 500 mg Chew Take 500 mg by mouth 3 (three) times daily.      dextrose (GLUCOSE GEL) 40 % gel Take 37.5 mLs (15,000 mg total) by mouth once as needed (hypoglycemia). 37.5 g 4    gabapentin (NEURONTIN) 300 MG capsule Take 300 mg by mouth every evening.      glucagon (BAQSIMI) 3 mg/actuation Spry 3 mg (one actuation) into a single nostril; if no response, may repeat in 15 minutes using a new intranasal device. (Patient taking differently: 3 mg by Left Nostril route as needed. 3 mg (one actuation) into a single nostril; if no response, may repeat in 15 minutes using a new intranasal device.) 2 each 1    [DISCONTINUED] blood-glucose meter (ACCU-CHEK KAYLIE PLUS METER) Misc To use to check blood sugars 4 times a day 1 each 0    [DISCONTINUED] clorazepate (TRANXENE) 3.75 MG Tab Take 7.5 mg by mouth nightly as needed.       [DISCONTINUED] fenofibrate 160 MG Tab Take 1 tablet (160 mg total) by mouth once daily. 90 tablet 3    [DISCONTINUED] lancing device with lancets (ACCU-CHEK SOFT DEV LANCETS) Kit To check blood sugars 4 times per day 400 each 3     Scheduled Meds:   " "amLODIPine  10 mg Oral Daily    ascorbic acid (vitamin C)  500 mg Oral Daily    aspirin  81 mg Oral Daily    azelastine  1 spray Nasal BID    calcium acetate(phosphat bind)  667 mg Oral TID WM    cetirizine  10 mg Oral Every other day    collagenase   Topical (Top) Daily    DAPTOmycin (CUBICIN)  IV  500 mg Intravenous Q48H    epoetin chris-epbx  100 Units/kg Intravenous Every Mon, Wed, Fri    fluticasone propionate  2 spray Each Nostril Daily    insulin detemir U-100  20 Units Subcutaneous QHS    losartan  100 mg Oral Daily    multivitamin  1 tablet Oral Daily     Continuous Infusions:  PRN Meds:.sodium chloride, sodium chloride 0.9%, sodium chloride 0.9%, acetaminophen, benzonatate, dextrose 50%, dextrose 50%, heparin (porcine), hydrALAZINE, HYDROcodone-acetaminophen, HYDROcodone-acetaminophen, HYDROcodone-acetaminophen, insulin aspart U-100, naloxone, ondansetron, ondansetron, polyethylene glycol, promethazine, sodium chloride 0.9%, sodium chloride 0.9%, sodium chloride 0.9%, sodium chloride 0.9%    Review of Systems:    Physical Exam:    BP (!) 145/65   Pulse (!) 59   Temp 97.8 °F (36.6 °C) (Oral)   Resp 19   Ht 5' 7" (1.702 m)   Wt 91.8 kg (202 lb 6.1 oz)   SpO2 97%   BMI 31.70 kg/m²     Physical Exam  Constitutional:       Appearance: She is well-developed. She is not diaphoretic.   HENT:      Head: Normocephalic and atraumatic.   Eyes:      General: No scleral icterus.     Pupils: Pupils are equal, round, and reactive to light.   Cardiovascular:      Rate and Rhythm: Normal rate and regular rhythm.   Pulmonary:      Effort: Pulmonary effort is normal. No respiratory distress.      Breath sounds: No stridor.   Abdominal:      General: There is no distension.      Palpations: Abdomen is soft.   Musculoskeletal:         General: No deformity. Normal range of motion.      Cervical back: Neck supple.   Skin:     General: Skin is warm and dry.      Findings: No erythema or rash.   Neurological:     "  Mental Status: She is alert and oriented to person, place, and time.      Cranial Nerves: No cranial nerve deficit.      Coordination: Abnormal coordination: .me.   Psychiatric:         Behavior: Behavior normal.       Results:  Recent Labs   Lab 05/13/22  0605 05/14/22  0554 05/15/22  0439 05/16/22  0945   * 135* 133* 129*   K 4.6 4.5 4.5 4.8   CL 92* 97 94* 90*   CO2 25 25 19* 19*   * 68* 88*  --    CREATININE 8.0* 6.4* 8.2*  --    ESTGFRAFRICA 5.9* 7.7* 5.7*  --    EGFRNONAA 5.1* 6.7* 5.0*  --    * 95 120* 149*       Recent Labs   Lab 05/10/22  0443 05/11/22  0512 05/14/22  0554 05/15/22  0439 05/16/22  0945   CALCIUM 9.1   < > 9.8 9.4 9.4   PHOS 3.3  --   --   --   --     < > = values in this interval not displayed.       Recent Labs   Lab 01/30/20  0705 03/10/22  0650   PTH, Intact 466.0 H 387.0 H       No results for input(s): POCTGLUCOSE in the last 168 hours.    Recent Labs   Lab 05/20/21  0500 03/10/22  0650 05/05/22  0320   Hemoglobin A1C 9.4 H >14.0 H 10.6 H       Recent Labs   Lab 05/13/22  0605 05/14/22  0554 05/15/22  0439 05/16/22  0945   WBC 24.10* 24.34* 20.76* 17.78*   HGB 9.5* 9.9* 9.6* 9.9*   HCT 29.8* 32.1* 30.7* 31.9*   * 447 450 504*   MCV 88 91 91 90   MCHC 31.9* 30.8* 31.3* 31.0*   MONO 5.0  CANCELED 2.0* 5.0  --        Recent Labs   Lab 05/19/21  0300   POC PH 7.273 L   POC PCO2 47.8 H   POC HCO3 22.1 L   POC PO2 22 LL   POC SATURATED O2 30 L   POC BE -5   Sample VENOUS     Patient care was time spent personally by me on the following activities: >  Min    · Obtaining a history  · Examination of patient.  · Providing medical care at the patients bedside.  · Developing a treatment plan with patient or surrogate and bedside caregivers  · Ordering and reviewing laboratory studies, radiographic studies, pulse oximetry.  · Ordering and performing treatments and interventions.  · Evaluation of patient's response to treatment.  · Discussions with consultants while on  the unit and immediately available to the patient.  · Re-evaluation of the patient's condition.  · Documentation in the medical record.     Bryce Craig MD  [facility] [address] [address]  [phone] (p)  [phone] (f)

## 2022-05-16 NOTE — PROGRESS NOTES
[facility] Medicine  Progress Note    Patient Name: Leanna García  MRN: [id number]  Patient Class: IP- Inpatient   Admission Date: 5/4/2022  Length of Stay: 12 days  Attending Physician: Usman Stacy MD  Primary Care Provider: Stefano Lopez MD        Subjective:     Principal Problem:Bacteremia        HPI:  Ms. García is a 56-year-old female with a past medical history of ESRD on HD Monday Wednesday Friday, hypertension, IDDM2, PAF, and chronic cough who presents today with complaints of fever up to 103. It is severe.  It is associated with cough, posttussive vomiting, fatigue, sinus congestion, weakness, and a left foot ulcer.  She denies chest pain, diarrhea, dizziness, loss of consciousness.  In the ED she was noted to have a large left foot ulceration at the plantar surface and on the side of the foot.  CT of abdomen pelvis also reveals possible enteritis and bilateral perinephric fat stranding.  WBCs 21 K, troponin is 0.4 in the setting of ESRD.  She did not go to dialysis today she felt too bad.  Glucose is 446, anion gap 20, bicarb 24.       Overview/Hospital Course:  05/05  Had HD today  MRI r/o osteomyelitis  Afebrile now    05/06  Had febrile episodes overnight  Blood cultures send to [facility] in [facility]  Had dialysis today  Awaiting WBC scan     05/07  WBC scan showed medial left foot infection  C/o extreme fatigue/weakness    05/08  Pt having on and off febrile episodes at night  Pt today had bedside debridement of L foot abscess     05/09  Pt had HD today  Febrile episodes eprsists  Receive done unit of blood     05/10  Overall much better except for constipation  Awaiting insurance approval for LTAC placement   Otherwise stable     05/11  LTAC stay denied by insurance  Insurance approved SNF placement  Medically stable     05/12  Pt still having febrile episodes  WBC levels high  Pt c/o pain on R foot area    05/13  Pt today had Incision and drainage below fascia of multiple  abscesses left foot  Postoperatively drowsy   Had HD today     05/14  No new issues  Awaiting SNF placement     05/15  Awaiting SNF placement  Medically stable    05/16  Awaiting SNF placement  Medically stable      Interval History:     Review of Systems   Constitutional:  Negative for activity change and appetite change.   HENT:  Negative for congestion and dental problem.    Eyes:  Negative for discharge and itching.   Respiratory:  Negative for shortness of breath.    Cardiovascular:  Negative for chest pain.   Gastrointestinal:  Negative for abdominal distention and abdominal pain.   Endocrine: Negative for cold intolerance.   Genitourinary:  Negative for difficulty urinating and dysuria.   Musculoskeletal:  Positive for arthralgias. Negative for back pain.   Skin:  Negative for color change.   Neurological:  Negative for dizziness and facial asymmetry.   Hematological:  Negative for adenopathy.   Psychiatric/Behavioral:  Negative for agitation and behavioral problems.    Objective:     Vital Signs (Most Recent):  Temp: 98 °F (36.7 °C) (05/16/22 1255)  Pulse: 62 (05/16/22 1255)  Resp: 19 (05/16/22 1255)  BP: (!) 159/64 (05/16/22 1255)  SpO2: 97 % (05/16/22 1255)   Vital Signs (24h Range):  Temp:  [97.5 °F (36.4 °C)-98.4 °F (36.9 °C)] 98 °F (36.7 °C)  Pulse:  [53-66] 62  Resp:  [16-19] 19  SpO2:  [95 %-97 %] 97 %  BP: ()/(42-86) 159/64     Weight: 91.8 kg (202 lb 6.1 oz)  Body mass index is 31.7 kg/m².    Intake/Output Summary (Last 24 hours) at 5/16/2022 1331  Last data filed at 5/16/2022 1255  Gross per 24 hour   Intake 1360 ml   Output 1500 ml   Net -140 ml      Physical Exam  Vitals and nursing note reviewed.   Constitutional:       General: She is not in acute distress.  HENT:      Head: Atraumatic.      Right Ear: External ear normal.      Left Ear: External ear normal.      Nose: Nose normal.      Mouth/Throat:      Mouth: Mucous membranes are moist.   Eyes:      General: No scleral  icterus.  Cardiovascular:      Rate and Rhythm: Normal rate.   Pulmonary:      Effort: Pulmonary effort is normal.   Abdominal:      General: There is no distension.      Palpations: Abdomen is soft.   Musculoskeletal:         General: Normal range of motion.      Cervical back: Normal range of motion.      Comments: L foot bandage intact   Skin:     General: Skin is warm.   Neurological:      Mental Status: She is alert and oriented to person, place, and time.   Psychiatric:         Behavior: Behavior normal.       Significant Labs: All pertinent labs within the past 24 hours have been reviewed.  CBC:   Recent Labs   Lab 05/15/22  0439 05/16/22  0945   WBC 20.76* 17.78*   HGB 9.6* 9.9*   HCT 30.7* 31.9*    504*     CMP:   Recent Labs   Lab 05/15/22  0439 05/16/22  0945   * 129*   K 4.5 4.8   CL 94* 90*   CO2 19* 19*   * 149*   BUN 88* 117*   CREATININE 8.2* 10.3*   CALCIUM 9.4 9.4   ANIONGAP 20* 20*   EGFRNONAA 5.0* 3.8*       Significant Imaging: I have reviewed all pertinent imaging results/findings within the past 24 hours.      Assessment/Plan:      * Bacteremia  Gram positive rods on BC X 1 :Bacillus cereus   Had enteritis/Food poisoning which has been resolved(ate chinese food before onset of symptoms)  Repeat Blood cultures so far normal  S/p Incision/drainage/debridement of  Left foot deep tissue abscess below fascia muscle and tendon  Again had Incision and drainage below fascia of multiple abscesses left foot on 05/13  Continue Daptomycin 500 mg IV q48h for 5 weeks  Medically stable to go to SNF     Sepsis  Bacillus cereus bacteremia along with L foot abscess       Abscess of left foot  S/p Incision/drainage/debridement of  Left foot deep tissue abscess below fascia muscle and tendon  Again had Incision and drainage below fascia of multiple abscesses left foot on 05/13      Diabetic ulcer of left midfoot  Wound care  MRI r/o osteomyelitis  WBC scan showed medial left foot as source of  infection  S/p Incision/drainage/debridement of  Left foot deep tissue abscess below fascia muscle and tendon  Again had Incision and drainage below fascia of multiple abscesses left foot on 05/13    Discharge planning issues  LTAC stay denied by insurance  Awaiting SNF placement and pt is medically stable       Chronic anemia  pRBC if needed along with Dialysis sessions  Received one unit of blood on 05/09      Diabetic foot infection  As above         Foot infection  As above       Cough  Symptomatic management      Acute bronchitis  Symptomatic management      Elevated troponin  Likely demand ischemia in the setting of ESRD        Hypokalemia  Stable     Enteritis  Resolved     Type 2 diabetes mellitus with kidney complication, with long-term current use of insulin  Maintain present regime       ESRD (end stage renal disease)  HD sessions as scheduled         VTE Risk Mitigation (From admission, onward)         Ordered     heparin (porcine) injection 5,000 Units  As needed (PRN)         05/10/22 1541     IP VTE HIGH RISK PATIENT  Once         05/05/22 0003     Place sequential compression device  Until discontinued         05/05/22 0003                Discharge Planning   ERI: 5/16/2022     Code Status: Full Code   Is the patient medically ready for discharge?: No    Reason for patient still in hospital (select all that apply): Pending disposition  Discharge Plan A: Skilled Nursing Facility   Discharge Delays: (!) Change in Medical Condition              Usman Stacy MD  Department of Hospital Medicine   [facility]

## 2022-05-16 NOTE — PT/OT/SLP PROGRESS
Occupational Therapy      Patient Name:  Leanna García   MRN:  [id number]    Patient not seen today secondary to Dialysis. Will follow-up next service date.    5/16/2022

## 2022-05-16 NOTE — PROGRESS NOTES
[facility]  Podiatry  Progress Note    Patient Name: Leanna García  MRN: [id number]  Admission Date: 5/4/2022  Hospital Length of Stay: 12 days  Attending Physician: Usman Stacy MD  Primary Care Provider: Stefano Lopez MD     Subjective:     Interval History:  Patient seen at bedside undergoing dialysis.  States that she has occasional shooting pains in the left foot but is adequately controlled at this time.  No other new complaints.    Follow-up For: Procedure(s) (LRB):  INCISION AND DRAINAGE, FOOT (Left)    Post-Operative Day: 3 Days Post-Op    Scheduled Meds:   amLODIPine  10 mg Oral Daily    ascorbic acid (vitamin C)  500 mg Oral Daily    aspirin  81 mg Oral Daily    azelastine  1 spray Nasal BID    calcium acetate(phosphat bind)  667 mg Oral TID WM    cetirizine  10 mg Oral Every other day    collagenase   Topical (Top) Daily    DAPTOmycin (CUBICIN)  IV  500 mg Intravenous Q48H    epoetin chris-epbx  100 Units/kg Intravenous Every Mon, Wed, Fri    fluticasone propionate  2 spray Each Nostril Daily    insulin detemir U-100  20 Units Subcutaneous QHS    losartan  100 mg Oral Daily    multivitamin  1 tablet Oral Daily     Continuous Infusions:  PRN Meds:sodium chloride, sodium chloride 0.9%, sodium chloride 0.9%, acetaminophen, benzonatate, dextrose 50%, dextrose 50%, heparin (porcine), hydrALAZINE, HYDROcodone-acetaminophen, HYDROcodone-acetaminophen, HYDROcodone-acetaminophen, insulin aspart U-100, naloxone, ondansetron, ondansetron, polyethylene glycol, promethazine, sodium chloride 0.9%, sodium chloride 0.9%, sodium chloride 0.9%, sodium chloride 0.9%    Review of Systems   Constitutional: Negative for chills, fatigue, fever and unexpected weight change.   HENT: Negative for hearing loss and trouble swallowing.    Eyes: Negative for photophobia and visual disturbance.   Respiratory: Negative for cough, shortness of breath and wheezing.    Cardiovascular: Negative for chest pain,  palpitations and leg swelling.   Gastrointestinal: Negative for abdominal pain and nausea.   Genitourinary: Negative for dysuria and frequency.   Musculoskeletal: Positive for arthralgias, gait problem and joint swelling. Negative for back pain and myalgias.   Skin: Positive for color change and wound. Negative for rash.   Neurological: Positive for weakness and numbness. Negative for tremors, seizures and headaches.   Hematological: Does not bruise/bleed easily.     Objective:     Vital Signs (Most Recent):  Temp: 97.8 °F (36.6 °C) (05/16/22 0945)  Pulse: 61 (05/16/22 1200)  Resp: 16 (05/16/22 1150)  BP: 127/70 (05/16/22 1200)  SpO2: 97 % (05/16/22 0945) Vital Signs (24h Range):  Temp:  [97.5 °F (36.4 °C)-98.4 °F (36.9 °C)] 97.8 °F (36.6 °C)  Pulse:  [53-66] 61  Resp:  [16-19] 16  SpO2:  [95 %-97 %] 97 %  BP: ()/(42-86) 127/70     Weight: 91.8 kg (202 lb 6.1 oz)  Body mass index is 31.7 kg/m².    Foot Exam    Laboratory:  A1C:   Recent Labs   Lab 03/10/22  0650 05/05/22  0320   HGBA1C >14.0* 10.6*     CBC:   Recent Labs   Lab 05/16/22 0945   WBC 17.78*   RBC 3.54*   HGB 9.9*   HCT 31.9*   *   MCV 90   MCH 28.0   MCHC 31.0*     CMP:   Recent Labs   Lab 05/16/22 0945   *   CALCIUM 9.4   *   K 4.8   CO2 19*   CL 90*   *   CREATININE 10.3*     CRP:   Recent Labs   Lab 05/16/22 0945   CRP 15.91*     ESR: No results for input(s): SEDRATE in the last 168 hours.  Wound Cultures:   Recent Labs   Lab 05/06/22  0753 05/13/22  0800   LABAERO METHICILLIN RESISTANT STAPHYLOCOCCUS AUREUS  Many  Results called to and read back by Kathy Maki RN-BCARD;    05/08/2022  08:32 CJD  * Skin kunal,  no predominant organism       Diagnostic Results:  I have reviewed all pertinent imaging results/findings within the past 24 hours.    Clinical Findings:  Wound VAC is in place to the left foot.  It is suctioning well.  Neurovascular status intact to the left foot.    Assessment/Plan:     Active  Diagnoses:    Diagnosis Date Noted POA    PRINCIPAL PROBLEM:  Bacteremia [R78.81] 05/05/2022 Unknown    Discharge planning issues [Z02.9] 05/11/2022 Not Applicable    Abscess of left foot [L02.612] 05/08/2022 Yes    Chronic anemia [D64.9] 05/08/2022 Unknown    Foot infection [L08.9]  Yes    Diabetic foot infection [E11.628, L08.9]  Yes    Sepsis [A41.9]  Yes    Diabetic ulcer of left midfoot [E11.621, L97.429] 05/05/2022 Yes    Enteritis [K52.9] 05/05/2022 Yes    Hypokalemia [E87.6] 05/05/2022 Yes    Elevated troponin [R77.8] 05/05/2022 Yes    Acute bronchitis [J20.9]  Unknown    Cough [R05.9]  Unknown    Type 2 diabetes mellitus with kidney complication, with long-term current use of insulin [E11.29, Z79.4] 06/25/2020 Not Applicable    ESRD (end stage renal disease) [N18.6] 05/18/2018 Yes      Problems Resolved During this Admission:    Diagnosis Date Noted Date Resolved POA    Type 2 diabetes mellitus with hyperglycemia, without long-term current use of insulin [E11.65] 06/25/2020 05/05/2022 Yes       Continue wound VAC therapy to the left foot.  Patient should be having the wound VAC changed today.  Once the wound VAC has been changed she is okay for discharge from a podiatry standpoint.  Patient is to remain nonweightbearing to the left foot at this time.    Ricardo Bruno DPM  Podiatry  [facility]

## 2022-05-16 NOTE — PROGRESS NOTES
[facility]  Adult Nutrition   Progress Note (Follow-Up)    SUMMARY      Recommendations:   1. Continue current diet as tolerated.  2. Continue Wilman BID and Glucerna TID as tolerated.  3.  to obtain daily meal choices.     Goals:   Goals:  1. Patient to meet at least 75% of estimated needs through meal intake.Progressing    Dietitian Rounds Brief  Patient eating well and drinks Wilman and Glucerna. Plan to discharge soon. Patient in HD at visit. Noted patient last BM 5/15/22. Labs and medications reviewed. Patient tolerates wound vac.    Diet order: Renal diet    % Intake of Estimated Energy Needs: 75 - 100 %  % Meal Intake: 75 - 100 %    Estimated/Assessed Needs  Weight Used For Calorie Calculations: 87 kg (191 lb 12.8 oz)  Energy Calorie Requirements (kcal): 2208-9924 kcals/day (20-25 kcals/kg)  Energy Need Method: Kcal/kg  Protein Requirements:  g/day (1.5-2.0 g/kg IBW 2' ESRD-on HD)  Weight Used For Protein Calculations: 61.2 kg (135 lb)  Fluid Requirements (mL): UOP +  1000 mL or per MD     RDA Method (mL): 1740       Weight History:  Wt Readings from Last 5 Encounters:   05/16/22 91.8 kg (202 lb 6.1 oz)   05/06/22 86.6 kg (191 lb)   04/28/22 94 kg (207 lb 5.5 oz)   04/26/22 93 kg (205 lb)   04/05/22 94.3 kg (208 lb)        Reason for Assessment  Reason For Assessment: length of stay  Diagnosis:  (bacteremia)  Relevant Medical History: ESRD- on HD, T2DM, HTN, CHF, MANJINDER, iron deficiency anemia, TIA  Interdisciplinary Rounds: did not attend    Medications:Pertinent Medications Reviewed  Scheduled Meds:   amLODIPine  10 mg Oral Daily    ascorbic acid (vitamin C)  500 mg Oral Daily    aspirin  81 mg Oral Daily    azelastine  1 spray Nasal BID    calcium acetate(phosphat bind)  667 mg Oral TID WM    cetirizine  10 mg Oral Every other day    collagenase   Topical (Top) Daily    DAPTOmycin (CUBICIN)  IV  500 mg Intravenous Q48H    epoetin chris-epbx  100 Units/kg Intravenous Every  Mon, Wed, Fri    fluticasone propionate  2 spray Each Nostril Daily    insulin detemir U-100  20 Units Subcutaneous QHS    losartan  100 mg Oral Daily    multivitamin  1 tablet Oral Daily     Continuous Infusions:  PRN Meds:.sodium chloride, sodium chloride 0.9%, sodium chloride 0.9%, acetaminophen, benzonatate, dextrose 50%, dextrose 50%, heparin (porcine), hydrALAZINE, HYDROcodone-acetaminophen, HYDROcodone-acetaminophen, HYDROcodone-acetaminophen, insulin aspart U-100, naloxone, ondansetron, ondansetron, polyethylene glycol, promethazine, sodium chloride 0.9%, sodium chloride 0.9%, sodium chloride 0.9%, sodium chloride 0.9%    Labs: Pertinent Labs Reviewed  Clinical Chemistry:  Recent Labs   Lab 05/10/22  0443 05/16/22  0945   * 129*   K 3.8 4.8   CL 95 90*   CO2 22* 19*   * 149*   BUN 52* 117*   CREATININE 6.5* 10.3*   CALCIUM 9.1 9.4   ANIONGAP 16 20*   ESTGFRAFRICA 7.6* 4.3*   EGFRNONAA 6.6* 3.8*   PHOS 3.3  --     < > = values in this interval not displayed.     CBC:   Recent Labs   Lab 05/16/22  0945   WBC 17.78*   RBC 3.54*   HGB 9.9*   HCT 31.9*   *   MCV 90   MCH 28.0   MCHC 31.0*     Cardiac Profile:  Recent Labs   Lab 05/10/22  0443 05/14/22  0554 05/16/22  0003   * 31 28     Inflammatory Labs:  Recent Labs   Lab 05/14/22  0554 05/15/22  0439 05/16/22  0945   CRP 19.97* 18.24* 15.91*     Monitor and Evaluation  Food and Nutrient Intake: energy intake, food and beverage intake  Food and Nutrient Adminstration: diet order  Physical Activity and Function: nutrition-related ADLs and IADLs, factors affecting access to physical activity  Anthropometric Measurements: weight, weight change, body mass index  Biochemical Data, Medical Tests and Procedures: electrolyte and renal panel, gastrointestinal profile, glucose/endocrine profile, inflammatory profile, lipid profile  Nutrition-Focused Physical Findings: overall appearance     Nutrition Risk  Level of Risk/Frequency of  Follow-up: moderate     Nutrition Follow-Up  RD Follow-up?: Yes    Sonia Christiansen RD 05/16/2022 11:38 AM

## 2022-05-16 NOTE — PT/OT/SLP PROGRESS
Physical Therapy Treatment    Patient Name:  Leanna García   MRN:  [id number]    Recommendations:     Discharge Recommendations:  nursing facility, skilled   Discharge Equipment Recommendations: other (see comments) (TBD at next level of care)   Barriers to discharge: increase assist with mobility    Assessment:     Leanna García is a 56 y.o. female admitted with a medical diagnosis of Bacteremia.  She presents with the following impairments/functional limitations:  weakness, impaired endurance, impaired self care skills, impaired functional mobilty, gait instability, impaired balance, decreased safety awareness, decreased lower extremity function, pain, impaired cardiopulmonary response to activity.    Pt agreeable to visit. Educated on NWB LLE. Emphasis on upright posture and standing tolerance. Standing tolerance limited to 60 sec x 3 trials. Slight increase in WB with initial sit to stand but able to maintain NWB in static standing at RW.     Rehab Prognosis: Fair; patient would benefit from acute skilled PT services to address these deficits and reach maximum level of function.    Recent Surgery: Procedure(s) (LRB):  INCISION AND DRAINAGE, FOOT (Left) 3 Days Post-Op    Plan:     During this hospitalization, patient to be seen 6 x/week to address the identified rehab impairments via gait training, therapeutic activities, therapeutic exercises, neuromuscular re-education and progress toward the following goals:    · Plan of Care Expires:  06/16/22    Subjective     Chief Complaint: room was cluttered  Patient/Family Comments/goals: go to rehab  Pain/Comfort:  · Pain Rating 1: 7/10  · Location - Side 1: Left  · Location 1: foot  · Pain Addressed 1: Reposition, Distraction, Cessation of Activity      Objective:     Communicated with RN prior to session.  Patient found HOB elevated with telemetry, peripheral IV, wound vac upon PT entry to room.     General Precautions: Standard, fall   Orthopedic  Precautions:LLE non weight bearing   Braces: N/A  Respiratory Status: Room air     Functional Mobility:  · Bed Mobility:     · Supine to Sit: supervision  · Sit to Supine: minimum assistance  · Transfers:     · Sit to Stand:  moderate assistance and of 2 persons with rolling walker      AM-PAC 6 CLICK MOBILITY          Therapeutic Activities and Exercises:   Pt educated on POC, discharge recommendation, NWB R LE, proper HP with transfers, upright posture in static standing, need for assist with mobility, use of call bell to seek assistance as needed and fall prevention      Patient left HOB elevated with all lines intact, call button in reach and bed alarm on..    GOALS:   Multidisciplinary Problems     Physical Therapy Goals        Problem: Physical Therapy    Goal Priority Disciplines Outcome Goal Variances Interventions   Physical Therapy Goal     PT, PT/OT Ongoing, Progressing     Description: Goals to be met by: 22     Patient will increase functional independence with mobility by performin. Supine to sit with Modified [place]  2. Sit to supine with Modified [place]  3. Sit to stand transfer with Contact Guard Assistance  4. Bed to chair transfer with Contact Guard Assistance using Rolling Walker maintaining NWB L LE  5. Wheelchair propulsion x150 feet with Supervision using bilateral uppper extremities                     Time Tracking:     PT Received On: 22  PT Start Time: 908     PT Stop Time: 928  PT Total Time (min): 20 min     Billable Minutes: Therapeutic Activity 20    Treatment Type: Treatment  PT/PTA: PT     PTA Visit Number: 0     2022

## 2022-05-16 NOTE — PLAN OF CARE
Problem: Skin Injury Risk Increased  Goal: Skin Health and Integrity  Intervention: Promote and Optimize Oral Intake  Flowsheets (Taken 5/16/2022 1204)  Oral Nutrition Promotion: calorie-dense liquids provided     Problem: Oral Intake Inadequate  Goal: Improved Oral Intake  Outcome: Met  Intervention: Promote and Optimize Oral Intake  Flowsheets (Taken 5/16/2022 1204)  Oral Nutrition Promotion: calorie-dense liquids provided

## 2022-05-16 NOTE — SUBJECTIVE & OBJECTIVE
Interval History:     Review of Systems   Constitutional:  Negative for activity change and appetite change.   HENT:  Negative for congestion and dental problem.    Eyes:  Negative for discharge and itching.   Respiratory:  Negative for shortness of breath.    Cardiovascular:  Negative for chest pain.   Gastrointestinal:  Negative for abdominal distention and abdominal pain.   Endocrine: Negative for cold intolerance.   Genitourinary:  Negative for difficulty urinating and dysuria.   Musculoskeletal:  Positive for arthralgias. Negative for back pain.   Skin:  Negative for color change.   Neurological:  Negative for dizziness and facial asymmetry.   Hematological:  Negative for adenopathy.   Psychiatric/Behavioral:  Negative for agitation and behavioral problems.    Objective:     Vital Signs (Most Recent):  Temp: 98 °F (36.7 °C) (05/16/22 1255)  Pulse: 62 (05/16/22 1255)  Resp: 19 (05/16/22 1255)  BP: (!) 159/64 (05/16/22 1255)  SpO2: 97 % (05/16/22 1255)   Vital Signs (24h Range):  Temp:  [97.5 °F (36.4 °C)-98.4 °F (36.9 °C)] 98 °F (36.7 °C)  Pulse:  [53-66] 62  Resp:  [16-19] 19  SpO2:  [95 %-97 %] 97 %  BP: ()/(42-86) 159/64     Weight: 91.8 kg (202 lb 6.1 oz)  Body mass index is 31.7 kg/m².    Intake/Output Summary (Last 24 hours) at 5/16/2022 1331  Last data filed at 5/16/2022 1255  Gross per 24 hour   Intake 1360 ml   Output 1500 ml   Net -140 ml      Physical Exam  Vitals and nursing note reviewed.   Constitutional:       General: She is not in acute distress.  HENT:      Head: Atraumatic.      Right Ear: External ear normal.      Left Ear: External ear normal.      Nose: Nose normal.      Mouth/Throat:      Mouth: Mucous membranes are moist.   Eyes:      General: No scleral icterus.  Cardiovascular:      Rate and Rhythm: Normal rate.   Pulmonary:      Effort: Pulmonary effort is normal.   Abdominal:      General: There is no distension.      Palpations: Abdomen is soft.   Musculoskeletal:          General: Normal range of motion.      Cervical back: Normal range of motion.      Comments: L foot bandage intact   Skin:     General: Skin is warm.   Neurological:      Mental Status: She is alert and oriented to person, place, and time.   Psychiatric:         Behavior: Behavior normal.       Significant Labs: All pertinent labs within the past 24 hours have been reviewed.  CBC:   Recent Labs   Lab 05/15/22  0439 05/16/22  0945   WBC 20.76* 17.78*   HGB 9.6* 9.9*   HCT 30.7* 31.9*    504*     CMP:   Recent Labs   Lab 05/15/22  0439 05/16/22  0945   * 129*   K 4.5 4.8   CL 94* 90*   CO2 19* 19*   * 149*   BUN 88* 117*   CREATININE 8.2* 10.3*   CALCIUM 9.4 9.4   ANIONGAP 20* 20*   EGFRNONAA 5.0* 3.8*       Significant Imaging: I have reviewed all pertinent imaging results/findings within the past 24 hours.

## 2022-05-16 NOTE — PLAN OF CARE
Problem: Adult Inpatient Plan of Care  Goal: Plan of Care Review  Outcome: Ongoing, Progressing  Goal: Absence of Hospital-Acquired Illness or Injury  Outcome: Ongoing, Progressing  Goal: Optimal Comfort and Wellbeing  Outcome: Ongoing, Progressing     Problem: Diabetes Comorbidity  Goal: Blood Glucose Level Within Targeted Range  Outcome: Ongoing, Progressing     Problem: Fall Injury Risk  Goal: Absence of Fall and Fall-Related Injury  Outcome: Ongoing, Progressing     Problem: Hemodynamic Instability (Hemodialysis)  Goal: Effective Tissue Perfusion  Outcome: Ongoing, Progressing

## 2022-05-17 LAB
ANION GAP SERPL CALC-SCNC: 13 MMOL/L (ref 8–16)
BASOPHILS # BLD AUTO: 0.06 K/UL (ref 0–0.2)
BASOPHILS NFR BLD: 0.4 % (ref 0–1.9)
BUN SERPL-MCNC: 71 MG/DL (ref 6–20)
CALCIUM SERPL-MCNC: 9.1 MG/DL (ref 8.7–10.5)
CHLORIDE SERPL-SCNC: 99 MMOL/L (ref 95–110)
CO2 SERPL-SCNC: 21 MMOL/L (ref 23–29)
CREAT SERPL-MCNC: 6.9 MG/DL (ref 0.5–1.4)
CRP SERPL-MCNC: 14.32 MG/DL
DIFFERENTIAL METHOD: ABNORMAL
EOSINOPHIL # BLD AUTO: 0.2 K/UL (ref 0–0.5)
EOSINOPHIL NFR BLD: 1.5 % (ref 0–8)
ERYTHROCYTE [DISTWIDTH] IN BLOOD BY AUTOMATED COUNT: 15.9 % (ref 11.5–14.5)
EST. GFR  (AFRICAN AMERICAN): 7 ML/MIN/1.73 M^2
EST. GFR  (NON AFRICAN AMERICAN): 6.1 ML/MIN/1.73 M^2
GLUCOSE SERPL-MCNC: 113 MG/DL (ref 70–110)
GLUCOSE SERPL-MCNC: 133 MG/DL (ref 70–110)
GLUCOSE SERPL-MCNC: 173 MG/DL (ref 70–110)
GLUCOSE SERPL-MCNC: 185 MG/DL (ref 70–110)
GLUCOSE SERPL-MCNC: 229 MG/DL (ref 70–110)
HCT VFR BLD AUTO: 30.2 % (ref 37–48.5)
HGB BLD-MCNC: 9.4 G/DL (ref 12–16)
IMM GRANULOCYTES # BLD AUTO: 0.48 K/UL (ref 0–0.04)
IMM GRANULOCYTES NFR BLD AUTO: 3 % (ref 0–0.5)
LYMPHOCYTES # BLD AUTO: 2 K/UL (ref 1–4.8)
LYMPHOCYTES NFR BLD: 12.4 % (ref 18–48)
MCH RBC QN AUTO: 28.1 PG (ref 27–31)
MCHC RBC AUTO-ENTMCNC: 31.1 G/DL (ref 32–36)
MCV RBC AUTO: 90 FL (ref 82–98)
MONOCYTES # BLD AUTO: 0.9 K/UL (ref 0.3–1)
MONOCYTES NFR BLD: 5.8 % (ref 4–15)
NEUTROPHILS # BLD AUTO: 12.2 K/UL (ref 1.8–7.7)
NEUTROPHILS NFR BLD: 76.9 % (ref 38–73)
NRBC BLD-RTO: 0 /100 WBC
PLATELET # BLD AUTO: 435 K/UL (ref 150–450)
PMV BLD AUTO: 9.7 FL (ref 9.2–12.9)
POTASSIUM SERPL-SCNC: 4.6 MMOL/L (ref 3.5–5.1)
RBC # BLD AUTO: 3.35 M/UL (ref 4–5.4)
SODIUM SERPL-SCNC: 133 MMOL/L (ref 136–145)
WBC # BLD AUTO: 15.86 K/UL (ref 3.9–12.7)

## 2022-05-17 PROCEDURE — 99231 SBSQ HOSP IP/OBS SF/LOW 25: CPT | Mod: ,,, | Performed by: INTERNAL MEDICINE

## 2022-05-17 PROCEDURE — 85025 COMPLETE CBC W/AUTO DIFF WBC: CPT | Performed by: NURSE PRACTITIONER

## 2022-05-17 PROCEDURE — 25000003 PHARM REV CODE 250: Performed by: NURSE PRACTITIONER

## 2022-05-17 PROCEDURE — 99231 PR SUBSEQUENT HOSPITAL CARE,LEVL I: ICD-10-PCS | Mod: ,,, | Performed by: INTERNAL MEDICINE

## 2022-05-17 PROCEDURE — 25000003 PHARM REV CODE 250: Performed by: INTERNAL MEDICINE

## 2022-05-17 PROCEDURE — 99232 PR SUBSEQUENT HOSPITAL CARE,LEVL II: ICD-10-PCS | Mod: ,,, | Performed by: PODIATRIST

## 2022-05-17 PROCEDURE — 12000002 HC ACUTE/MED SURGE SEMI-PRIVATE ROOM

## 2022-05-17 PROCEDURE — 97116 GAIT TRAINING THERAPY: CPT

## 2022-05-17 PROCEDURE — 36415 COLL VENOUS BLD VENIPUNCTURE: CPT | Performed by: NURSE PRACTITIONER

## 2022-05-17 PROCEDURE — 63600175 PHARM REV CODE 636 W HCPCS: Performed by: INTERNAL MEDICINE

## 2022-05-17 PROCEDURE — 97110 THERAPEUTIC EXERCISES: CPT

## 2022-05-17 PROCEDURE — 86140 C-REACTIVE PROTEIN: CPT | Performed by: INTERNAL MEDICINE

## 2022-05-17 PROCEDURE — 97530 THERAPEUTIC ACTIVITIES: CPT

## 2022-05-17 PROCEDURE — 80048 BASIC METABOLIC PNL TOTAL CA: CPT | Performed by: NURSE PRACTITIONER

## 2022-05-17 PROCEDURE — 99232 SBSQ HOSP IP/OBS MODERATE 35: CPT | Mod: ,,, | Performed by: PODIATRIST

## 2022-05-17 RX ORDER — ALPRAZOLAM 0.5 MG/1
0.5 TABLET ORAL 3 TIMES DAILY PRN
Status: DISCONTINUED | OUTPATIENT
Start: 2022-05-17 | End: 2022-05-19 | Stop reason: HOSPADM

## 2022-05-17 RX ADMIN — AZELASTINE HYDROCHLORIDE 137 MCG: 137 SPRAY, METERED NASAL at 10:05

## 2022-05-17 RX ADMIN — AMLODIPINE BESYLATE 10 MG: 5 TABLET ORAL at 10:05

## 2022-05-17 RX ADMIN — AZELASTINE HYDROCHLORIDE 137 MCG: 137 SPRAY, METERED NASAL at 09:05

## 2022-05-17 RX ADMIN — PIPERACILLIN AND TAZOBACTAM 4.5 G: 4; .5 INJECTION, POWDER, LYOPHILIZED, FOR SOLUTION INTRAVENOUS; PARENTERAL at 03:05

## 2022-05-17 RX ADMIN — FLUTICASONE PROPIONATE 100 MCG: 50 SPRAY, METERED NASAL at 10:05

## 2022-05-17 RX ADMIN — INSULIN DETEMIR 20 UNITS: 100 INJECTION, SOLUTION SUBCUTANEOUS at 09:05

## 2022-05-17 RX ADMIN — THERA TABS 1 TABLET: TAB at 10:05

## 2022-05-17 RX ADMIN — HYDROCODONE BITARTRATE AND ACETAMINOPHEN 1 TABLET: 5; 325 TABLET ORAL at 09:05

## 2022-05-17 RX ADMIN — HYDRALAZINE HYDROCHLORIDE 10 MG: 20 INJECTION INTRAMUSCULAR; INTRAVENOUS at 05:05

## 2022-05-17 RX ADMIN — INSULIN ASPART 2 UNITS: 100 INJECTION, SOLUTION INTRAVENOUS; SUBCUTANEOUS at 09:05

## 2022-05-17 RX ADMIN — CETIRIZINE HYDROCHLORIDE 10 MG: 10 TABLET, FILM COATED ORAL at 10:05

## 2022-05-17 RX ADMIN — LOSARTAN POTASSIUM 100 MG: 50 TABLET, FILM COATED ORAL at 10:05

## 2022-05-17 RX ADMIN — OXYCODONE HYDROCHLORIDE AND ACETAMINOPHEN 500 MG: 500 TABLET ORAL at 10:05

## 2022-05-17 RX ADMIN — ASPIRIN 81 MG: 81 TABLET, COATED ORAL at 10:05

## 2022-05-17 RX ADMIN — CALCIUM ACETATE 667 MG: 667 CAPSULE ORAL at 12:05

## 2022-05-17 RX ADMIN — INSULIN ASPART 1 UNITS: 100 INJECTION, SOLUTION INTRAVENOUS; SUBCUTANEOUS at 06:05

## 2022-05-17 RX ADMIN — COLLAGENASE SANTYL: 250 OINTMENT TOPICAL at 10:05

## 2022-05-17 RX ADMIN — HYDROCODONE BITARTRATE AND ACETAMINOPHEN 1 TABLET: 5; 325 TABLET ORAL at 12:05

## 2022-05-17 RX ADMIN — CALCIUM ACETATE 667 MG: 667 CAPSULE ORAL at 05:05

## 2022-05-17 RX ADMIN — CALCIUM ACETATE 667 MG: 667 CAPSULE ORAL at 10:05

## 2022-05-17 RX ADMIN — ALPRAZOLAM 0.5 MG: 0.5 TABLET ORAL at 12:05

## 2022-05-17 NOTE — PLAN OF CARE
Problem: Physical Therapy  Goal: Physical Therapy Goal  Description: Goals to be met by: 22     Patient will increase functional independence with mobility by performin. Supine to sit with Modified [place]  2. Sit to supine with Modified [place]  3. Sit to stand transfer with Contact Guard Assistance  4. Bed to chair transfer with Contact Guard Assistance using Rolling Walker maintaining NWB L LE  5. Wheelchair propulsion x150 feet with Supervision using bilateral uppper extremities    Outcome: Ongoing, Progressing

## 2022-05-17 NOTE — PROGRESS NOTES
[facility]  Podiatry  Progress Note    Patient Name: Leanna García  MRN: [id number]  Admission Date: 5/4/2022  Hospital Length of Stay: 13 days  Attending Physician: Usman Stacy MD  Primary Care Provider: Stefano Lopez MD     Subjective:     Interval History:  Patient seen at bedside.  She states continued off and on pain with her left foot though no significant changes.  Patient's CRP and white count remain high.  Photos from Wound Care were reviewed from wound VAC change today and there appears to be significant increase in swelling and erythema to the left foot.  I discussed with wound care nurse is in the state that there was no purulent drainage from the patient's wounds at the time of the wound VAC change.    Follow-up For: Procedure(s) (LRB):  INCISION AND DRAINAGE, FOOT (Left)    Post-Operative Day: 4 Days Post-Op    Scheduled Meds:   amLODIPine  10 mg Oral Daily    ascorbic acid (vitamin C)  500 mg Oral Daily    aspirin  81 mg Oral Daily    azelastine  1 spray Nasal BID    calcium acetate(phosphat bind)  667 mg Oral TID WM    cetirizine  10 mg Oral Every other day    collagenase   Topical (Top) Daily    DAPTOmycin (CUBICIN)  IV  500 mg Intravenous Q48H    epoetin chris-epbx  100 Units/kg Intravenous Every Mon, Wed, Fri    fluticasone propionate  2 spray Each Nostril Daily    insulin detemir U-100  20 Units Subcutaneous QHS    losartan  100 mg Oral Daily    multivitamin  1 tablet Oral Daily     Continuous Infusions:  PRN Meds:sodium chloride, sodium chloride 0.9%, sodium chloride 0.9%, acetaminophen, ALPRAZolam, benzonatate, dextrose 50%, dextrose 50%, heparin (porcine), hydrALAZINE, HYDROcodone-acetaminophen, insulin aspart U-100, naloxone, ondansetron, ondansetron, polyethylene glycol, promethazine, sodium chloride 0.9%, sodium chloride 0.9%, sodium chloride 0.9%, sodium chloride 0.9%    Review of Systems   Constitutional: Negative for chills, fatigue, fever and unexpected  weight change.   HENT: Negative for hearing loss and trouble swallowing.    Eyes: Negative for photophobia and visual disturbance.   Respiratory: Negative for cough, shortness of breath and wheezing.    Cardiovascular: Negative for chest pain, palpitations and leg swelling.   Gastrointestinal: Negative for abdominal pain and nausea.   Genitourinary: Negative for dysuria and frequency.   Musculoskeletal: Positive for arthralgias, gait problem and joint swelling. Negative for back pain and myalgias.   Skin: Positive for color change and wound. Negative for rash.   Neurological: Positive for weakness and numbness. Negative for tremors, seizures and headaches.   Hematological: Does not bruise/bleed easily.     Objective:     Vital Signs (Most Recent):  Temp: 98.2 °F (36.8 °C) (05/17/22 1121)  Pulse: 67 (05/17/22 1121)  Resp: 18 (05/17/22 1217)  BP: (!) 168/69 (05/17/22 1121)  SpO2: 98 % (05/17/22 1121) Vital Signs (24h Range):  Temp:  [97.7 °F (36.5 °C)-99.6 °F (37.6 °C)] 98.2 °F (36.8 °C)  Pulse:  [67-72] 67  Resp:  [16-19] 18  SpO2:  [95 %-98 %] 98 %  BP: (130-176)/(62-83) 168/69     Weight: 91.8 kg (202 lb 6.1 oz)  Body mass index is 31.7 kg/m².    Foot Exam    Laboratory:  A1C:   Recent Labs   Lab 03/10/22  0650 05/05/22  0320   HGBA1C >14.0* 10.6*     CBC:   Recent Labs   Lab 05/17/22  0609   WBC 15.86*   RBC 3.35*   HGB 9.4*   HCT 30.2*      MCV 90   MCH 28.1   MCHC 31.1*     CMP:   Recent Labs   Lab 05/17/22  0609   *   CALCIUM 9.1   *   K 4.6   CO2 21*   CL 99   BUN 71*   CREATININE 6.9*     CRP:   Recent Labs   Lab 05/17/22  0609   CRP 14.32*     ESR: No results for input(s): SEDRATE in the last 168 hours.  Wound Cultures:   Recent Labs   Lab 05/06/22  2953 05/13/22  0800   LABAERO METHICILLIN RESISTANT STAPHYLOCOCCUS AUREUS  Many  Results called to and read back by Kathy [facility]aki [facility];    05/08/2022  08:32 CJD  * Skin kunal,  no predominant organism       Diagnostic Results:  I  have reviewed all pertinent imaging results/findings within the past 24 hours.    Clinical Findings:      5/14                          Patient's foot is significantly more edematous and erythematous compared to previous exam.  Wound VAC is currently in place.    Assessment/Plan:     Active Diagnoses:    Diagnosis Date Noted POA    PRINCIPAL PROBLEM:  Bacteremia [R78.81] 05/05/2022 Unknown    Discharge planning issues [Z02.9] 05/11/2022 Not Applicable    Abscess of left foot [L02.612] 05/08/2022 Yes    Chronic anemia [D64.9] 05/08/2022 Unknown    Foot infection [L08.9]  Yes    Diabetic foot infection [E11.628, L08.9]  Yes    Sepsis [A41.9]  Yes    Diabetic ulcer of left midfoot [E11.621, L97.429] 05/05/2022 Yes    Enteritis [K52.9] 05/05/2022 Yes    Hypokalemia [E87.6] 05/05/2022 Yes    Elevated troponin [R77.8] 05/05/2022 Yes    Acute bronchitis [J20.9]  Unknown    Cough [R05.9]  Unknown    Type 2 diabetes mellitus with kidney complication, with long-term current use of insulin [E11.29, Z79.4] 06/25/2020 Not Applicable    ESRD (end stage renal disease) [N18.6] 05/18/2018 Yes      Problems Resolved During this Admission:    Diagnosis Date Noted Date Resolved POA    Type 2 diabetes mellitus with hyperglycemia, without long-term current use of insulin [E11.65] 06/25/2020 05/05/2022 Yes       I discussed the patient I have concerns given the clinical deterioration of her left foot.  I am going to order a repeat MRI of the left foot to assess for any residual abscess or infection.  If MRI does show any drainable abscess that will likely take the patient to the OR tomorrow.  Discussed with hospitalist, Infectious Disease, and Wound Care nurses.    Ricardo Bruno DPM  Podiatry  Former[facility]

## 2022-05-17 NOTE — PLAN OF CARE
05/17/22 1641   Medicare Message   Important Message from Medicare regarding Discharge Appeal Rights Given to patient/caregiver;Explained to patient/caregiver;Signed/date by patient/caregiver   Date IMM was signed 05/17/22   Time IMM was signed [id number]

## 2022-05-17 NOTE — CARE UPDATE
05/16/22 1931   PRE-TX-O2   O2 Device (Oxygen Therapy) room air   SpO2 97 %   Pulse Oximetry Type Intermittent   $ Pulse Oximetry - Multiple Charge Pulse Oximetry - Multiple   Education   $ Education 15 min   Respiratory Evaluation   $ Care Plan Tech Time 15 min   $ Eval/Re-eval Charges Re-evaluation

## 2022-05-17 NOTE — PROGRESS NOTES
NMConsult Note  Infectious Disease    Reason for Consult:  Bacillus cereus bacteremia, and MRSA osteomyelitis     HPI: Leanna García is a 56 y.o. female known to me from prior consultation in September of 2018, was admitted through the emergency room yesterday with fever 103,   Vomiting of 1 day's duration, and chronic sinus congestion, postnasal drip, cough at least several weeks duration and a foot ulcer on the plantar/medial surface surface of the left foot of probably several months duration.  The vomiting began within a few hours of a meal at a [employer] restaurant.  She did not have any diarrhea In the emergency room she had a CT scan of the abdomen which suggested possible enteritis and bilateral perinephric fat stranding, as well as cholelithiasis, and extensive vascular calcifications.  her blood sugar was 446, white blood cells 21,000, normal lactic acid.  Photographs taken in the emergency room suggest cellulitis of the medial and plantar foot She was started on Zosyn and doxycycline as she has history of vancomycin allergy. .  She was seen by Podiatry , and MRI did not demonstrate any abscess or drainage and wound care was ordered.  She was noted this morning to have difficulty swallowing and was seen by speech therapy.  Today 1 anaerobic bottle has a Gram-positive mariana prompting this consult.    5/6: interim reviewed. Still febrile through last night. Blood culture does have characteristics of Bacillus cereus. This will be sent out to [facility] for MALDI ID. Respiratory pcr panel was negative. She was able to eat solid food this am. She denies abdominal pain. Podiatry debrided her foot this am and submitted cultures and is ordering a WBC Scan. Coughing less. No wheezes.     5/7 (Daren):  Interim reviewed, discussed with Dr Bailon.  Patient seen and examined at bedside, states she does not feel good today, feels like she got something that is making her feel off, not her usual self.  Labile BP  121/58, low-grade fever T-max 102° yesterday, currently 100.8.  Wound cultures from left foot grew Staph aureus, pending sensitivities.  She is currently on Daptomycin, Zosyn and levofloxacin IV.    5/8: Interim reviewed, patient seen and examined at bedside. Feeling significantly better compared to yesterday.  Having lunch at bedside.  Hemodynamically stable, T-max 103° yesterday, currently afebrile.  Labs reviewed, white count 17, PMN 79.2%, no bands.  H&H 7/23.3, platelet count 332. Sodium 135, potassium 4.4.  Status post further debridement at bedside today by Podiatry, large deep tissue abscess seen on the plantar facial below the flexor tendon.  Mild necrosis noted and all nonviable tissue was debrided, cultures in process.  Micro updated, confirmed Bacillus cereus bacteremia, wound culture from left foot grew MRSA.    5/9 (Adamaris):  Interim reviewed.  Blood culture isolate confirmed to be bacillus serious, likely from the restaurant that she ate before the onset of her illness.  Wound cultures from 05/16//8 have MRSA.  As she is allergic to vancomycin she is on daptomycin.  Echocardiogram negative temperature is improved since the drainage of her foot though she did have a 101 temperature last night.  White blood cells remain elevated, hemoglobin 6.7, she is receiving blood, CRP is down about 10%. Not getting out of bed. Sugary soft drinks on her bedside table.   5/10: interim reviewed. Fever has finally resolved. Received blood with dialysis yesterday. Foot cultures have grown MSSA and MRSA. Currently struggling with constipation, miserable, on bedpan.   5/11: low grade temp again today. Per RN, wound care did note some purulence yesterday in foot wound at the time of wound vac application yesterday. Diarrhea? after laxatives. WBC still very high, midline in place now.   5/12: still has fever, and leukocytosis, plans for debridement in OR noted. KUB normal.  She currently has no complaints, is eating well,  no diarrhea, much improved cough and congestion compared to admission.  No IV site phlebitis, no rashes, no thrush, no inflammation associated with her left arm AV fistula, no dysuria.  5/14:  Interim reviewed fever seems to have resolved.  Operative note from yesterday reviewed.  Cultures taken in the operating room yesterday are no growth so far.  Plans for discharge to skilled nursing at [facility] noted.  Having some pain from surgical site today.  She has a warm compress on it which gives her some relief.  5/17: interim reviewed. Wound care photos reviewed and there are worrisome changes in the appearance of the plantar surface.  Wound vac is now in place again with generous amount of gentian violet. Her foot is warm, not more painful. There is morebruising and there was increased maceration on plantar surface. Her CRP is falling slowly, as is her WBC count. MRI has been ordered by podiatry.     EXAM & DIAGNOSTICS REVIEWED:     Vitals:     Temp:  [97.7 °F (36.5 °C)-99.6 °F (37.6 °C)]   Temp: 98.2 °F (36.8 °C) (05/17/22 1121)  Pulse: 67 (05/17/22 1121)  Resp: 18 (05/17/22 1217)  BP: (!) 168/69 (05/17/22 1121)  SpO2: 98 % (05/17/22 1121)    Intake/Output Summary (Last 24 hours) at 5/17/2022 1336  Last data filed at 5/17/2022 1200  Gross per 24 hour   Intake 960 ml   Output --   Net 960 ml        General:  In NAD. Alert and attentive, cooperative, comfortable on room air, completely nontoxic  Eyes:  Anicteric,  EOMI  ENT:  No ulcers, exudates, thrush, nares patent, edentulous  Neck:  Supple,   Lungs:    Heart:      Abd:     :  Voids   Musc:  Excluding left foot, Joints without effusion, swelling, erythema, synovitis but she does have lower extremity muscle wasting.  She has Charcot arthropathy with fallen arches bilaterally    Skin:  No rashes   Neuro: Alert, attentive, speech fluent, face symmetric, moves all extremities, no focal weakness.  Minimally Ambulatory at baseline  Psych:  Calm,  cooperative  Lymphatic:        Extrem: Left foot with wound vac in place     No edema, erythema, phlebitis, warm and well perfused  VAD:  Left arm AV fistula  No redness , right arm midline    Isolation: contact - MRSA  Wound:           5/6:   This was debrided full thickness this am    5/8:        5/9: bandage changed, packing not disturbed  5/11: wound vac in place  5/14:  Wound VAC in place, no new wound photos    5/17:       Wound vac is now in place again with generous amount of gentian violet. Her foot is warm, not more painful. There is morebruising and there was increased maceration on plantar surface. There is no crepitance, no bullae formation, and there is now a DTI of the heel. Which is tender.                 General Labs reviewed:  Recent Labs   Lab 05/15/22  0439 05/16/22  0945 05/17/22  0609   WBC 20.76* 17.78* 15.86*   HGB 9.6* 9.9* 9.4*   HCT 30.7* 31.9* 30.2*    504* 435       Recent Labs   Lab 05/15/22  0439 05/16/22  0945 05/17/22  0609   * 129* 133*   K 4.5 4.8 4.6   CL 94* 90* 99   CO2 19* 19* 21*   BUN 88* 117* 71*   CREATININE 8.2* 10.3* 6.9*   CALCIUM 9.4 9.4 9.1     Recent Labs   Lab 05/15/22  0439 05/16/22  0945 05/17/22  0609   CRP 18.24* 15.91* 14.32*         Micro:  Microbiology Results (last 7 days)     Procedure Component Value Units Date/Time    Culture, Anaerobic [[id number]] Collected: 05/13/22 0800    Order Status: Completed Specimen: Wound from Foot, Left Updated: 05/16/22 0715     Anaerobic Culture No anaerobes isolated    AFB Culture & Smear [[id number]] Collected: 05/13/22 0800    Order Status: Completed Specimen: Wound from Foot, Left Updated: 05/15/22 0848     AFB CULTURE STAIN No acid fast bacilli seen.     AFB CULTURE STAIN Testing performed by:     AFB CULTURE STAIN Lab [facility]     AFB CULTURE STAIN [address] [facility] [address]     [place] [address]     AFB CULTURE STAIN Dr.Brian John MD    Gram stain [[id number]] Collected:  05/13/22 0800    Order Status: Completed Specimen: Wound from Foot, Left Updated: 05/15/22 0820     Gram Stain Result Rare WBC's      No organisms seen    Aerobic culture [[id number]] Collected: 05/13/22 0800    Order Status: Completed Specimen: Wound from Foot, Left Updated: 05/15/22 0743     Aerobic Bacterial Culture Skin kunal,  no predominant organism    Fungus culture [[id number]] Collected: 05/13/22 0800    Order Status: Sent Specimen: Wound from Foot, Left Updated: 05/13/22 1048    Blood culture [[id number]] Collected: 05/08/22 0723    Order Status: Completed Specimen: Blood Updated: 05/13/22 1032     Blood Culture, Routine No growth after 5 days.    Narrative:      Collection has been rescheduled by SLR at 05/08/2022 06:15 Reason:   Unable to collect  Collection has been rescheduled by RE1 at 05/08/2022 06:55 Reason:   Unable to collect   Collection has been rescheduled by SLR at 05/08/2022 06:15 Reason:   Unable to collect  Collection has been rescheduled by RE1 at 05/08/2022 06:55 Reason:   Unable to collect     Culture, Anaerobic [[id number]] Collected: 05/08/22 1003    Order Status: Completed Specimen: Abscess from Foot, Left Updated: 05/11/22 1708     Anaerobic Culture No anaerobes isolated    Blood culture [[id number]] Collected: 05/06/22 1100    Order Status: Completed Specimen: Blood Updated: 05/11/22 1232     Blood Culture, Routine No growth after 5 days.    Narrative:      Collection has been rescheduled by RE1 at 05/06/2022 08:51 Reason:   Having a scan  Collection has been rescheduled by SLR at 05/06/2022 10:27 Reason:   Unable to collect  Collection has been rescheduled by RE1 at 05/06/2022 10:35 Reason:   Unable to collect  Collection has been rescheduled by RE1 at 05/06/2022 08:51 Reason:   Having a scan  Collection has been rescheduled by SLR at 05/06/2022 10:27 Reason:   Unable to collect  Collection has been rescheduled by RE1 at 05/06/2022 10:35 Reason:   Unable to collect          Imaging  Reviewed:   CXR   CT abdomen and pelvis 5/4   1.  Small to moderate amount of fluid in the small intestines with scattered air fluid levels, possibly enteritis 2.  Moderate bilateral perinephric stranding. Correlate with urinalysis to exclude the possibility of pyelonephritis 3.  Gallbladder distention with gallstones     MRI of the left midfoot 5/5  IMPRESSION: 16mm skin ulceration in the medial plantar soft tissues with associated cellulitis. There is no abscess or osteomyelitis   Moderate degenerative changes of the midfoot    NM scan Abnormal indium-111 WBC uptake along medial left foot suggesting source of infection.     KUB 5/12 WNL    Cardiology:   ECHO 5/6/22  The left ventricle is normal in size with normal systolic function.  The estimated ejection fraction is 65%.  Normal left ventricular diastolic function.  Normal right ventricular size with normal right ventricular systolic function.  Mild mitral regurgitation.  All valves visualized, normal.    IMPRESSION & PLAN     1. Bacillus cereus bacteremia likely in the setting of recent Chinese food ingestion   Blood cultures 5/6 & 5/8 no growth, pending final   Echocardiogram negative   [facility] health department notified    2. Left foot ulcer, deep tissue abscess/osteomyelitis status post debridement by Podiatry 5/6 and 5/8, deep pocket of pus drained 5/9, debridement in OR 5/13   Wound culture 5/6 MRSA and MSSA    3.  ESRD on HD,  Nephrology following    4.  Diabetes with hyperglycemia, A1c 14%  5. constipation     Recommendations:   adding back zosyn for a little while  MRI pending    Continue Daptomycin 500 mg IV q48h open (or after dialysis on dialysis days) for MRSA, MSSA and Bacillus cereus for another 5 weeks     Weekly cpk, hold statins while on daptomycin     No opposition to transfer to skilled nursing now     Needs aggressive glycemic control  D/w  Patient  , wound care, hospital medicine and podiatry    Will follow       Medical Decision Making  during this encounter was  [_] Low Complexity  [_] Moderate Complexity  [x  ] High Complexity

## 2022-05-17 NOTE — CONSULTS
Left foot redness extends from great toe base to lower plantar foot.  Secure chat to Dr. PA Bruno.  Foot is warm to touch, macerated peeling.  1.  Incision,  1.2x0.4x0.5cm bleeding when probed, continues to ooze a blood.  Had to hold pressure. 2. Center plantar wound  2x2.5x1.6cm boggy grayish devitalized tissue.  Santyl to wound bed.  3. Plantar medial ulcer 1.2x1.4x0.3cm red granulation, 4. 1x0.3x1cm incision. Painted with  Gentian violet to prevented further maceration. Great toe and 2nd toe dorsal dry ulcer, painted with betadine.  Window framed between wounds and incisions.  1 green sponge placed in each wound with with 2 pieces for bridging.  Suction at -150 megaherts    Left heel purple maroon DTI no open wound 4.2x6cm, pictures of all sent to Dr PA Bruno.  Painted with betadine, foam dressing applied, wrapped with kerlix, elevated on pillows. Will apply heel protectors.  Instructed pt to keep heels elevated.  Dr.T Bruno on unit, discussed above.

## 2022-05-17 NOTE — PLAN OF CARE
Patient continues to need hospitalization and is not medically clear for discharge. Plan remains SNF at discharge. Patient accepted at [facility].          05/17/22 5052   Discharge Reassessment   Assessment Type Discharge Planning Reassessment   Did the patient's condition or plan change since previous assessment? No   Discharge Plan discussed with: Patient   Discharge Plan A Skilled Nursing Facility   Discharge Plan B Skilled Nursing Facility   Why the patient remains in the hospital Requires continued medical care

## 2022-05-17 NOTE — PROGRESS NOTES
INPATIENT NEPHROLOGY Progress Note  [facility] NEPHROLOGY    Leanna García  05/17/2022    Reason for consultation:  ESRD    Chief Complaint:   Chief Complaint   Patient presents with    Fever     History of Present Illness:  Ms. García is a 56-year-old female with a past medical history of ESRD on HD Monday Wednesday Friday, hypertension, IDDM2, PAF, and chronic cough who presents today with complaints of fever up to 103. It is severe.  It is associated with cough, posttussive vomiting, fatigue, sinus congestion, weakness, and a left foot ulcer.  She denies chest pain, diarrhea, dizziness, loss of consciousness.  In the ED she was noted to have a large left foot ulceration at the plantar surface and on the side of the foot.  CT of abdomen pelvis also reveals possible enteritis and bilateral perinephric fat stranding.  WBCs 21 K, troponin is 0.4 in the setting of ESRD.  She did not go to dialysis today she felt too bad.  Glucose is 446, anion gap 20, bicarb 24.     5/5  C/o foot pain.  Mild dyspnea.  No vomiting, chest pain, urinary or bowel complaints.  Weak  5/6  Currently getting wound vac placed.  Has foot pain. No respiratory distress  5/7 still spiking fevers.  Foot pain.  Stopped hd early yesterday  5/8  Tired today.  No vomiting or sob.  Tmax 101.9 at 1900 yesterday  5/9 wound vac today  5/10 no issues with HD yest- got 1u of blood- net UF 2.6L- waiting for wound vac- gave ok with PICC  5/11 febrile, BP stable, on 2L NC- seen on HD- no complaints; has wound vac; looking to [facility] at [facility]  5/12 plan for [facility]  5/13 went for I&D of L foot abscesses   5/14 no events overnight  5/15 no events overnight- awaiting SNF  5/16 VSS, no new complains. HD today.  5/17 VSS, HD in AM.    Plan of Care:     ESRD on HD MWF  --continue dialysis per routine    Hypertension  --low salt diet 2g/day  --uf with hd    Hyponatremia  --fluid restrict 1.5L/day  --140 Na dialysate    Secondary HPT  --continue binder with  meals     Anemia of CKD  --Hgb is better after transfusion on 5/9- stabilizing  --continue FERMÍN with HD    Diab Foot Ulcer- MRSA  --podiatry following, getting procedures PRN  --wound vac in place  --dose antbx for CrCl< 10/HD    Thank you for allowing us to participate in this patient's care. We will continue to follow.    Vital Signs:  Temp Readings from Last 3 Encounters:   05/17/22 97.7 °F (36.5 °C) (Oral)   04/26/22 98.2 °F (36.8 °C)   03/10/22 97.3 °F (36.3 °C) (Tympanic)       Pulse Readings from Last 3 Encounters:   05/17/22 69   04/26/22 66   04/05/22 75       BP Readings from Last 3 Encounters:   05/17/22 (!) 153/62   04/28/22 (!) 180/82   04/26/22 (!) 174/66       Weight:  Wt Readings from Last 3 Encounters:   05/16/22 91.8 kg (202 lb 6.1 oz)   05/06/22 86.6 kg (191 lb)   04/28/22 94 kg (207 lb 5.5 oz)       Medications:  No current facility-administered medications on file prior to encounter.     Current Outpatient Medications on File Prior to Encounter   Medication Sig Dispense Refill    ascorbic acid, vitamin C, (VITAMIN C) 500 MG tablet Take 500 mg by mouth once daily.      aspirin (ECOTRIN) 81 MG EC tablet Take 81 mg by mouth once daily.      atorvastatin (LIPITOR) 80 MG tablet Take 1 tablet (80 mg total) by mouth once daily. (Patient taking differently: Take 80 mg by mouth every evening.) 90 tablet 3    blood sugar diagnostic Strp To check BG 4 times daily, to use with insurance preferred meter (Patient taking differently: 1 strip by Misc.(Non-Drug; Combo Route) route 4 (four) times daily. To check BG 4 times daily, to use with insurance preferred meter) 450 strip 3    insulin aspart U-100 (NOVOLOG FLEXPEN U-100 INSULIN) 100 unit/mL (3 mL) InPn pen Inject 5-8 units 3 times per day with food. 1 Box 6    insulin detemir U-100 (LEVEMIR FLEXTOUCH U-100 INSULN) 100 unit/mL (3 mL) InPn pen Inject 15-18 Units into the skin once daily. 1 Box 6    lancets Misc To use to check blood sugar 4 times daily.  "To use with insurance approved meter. Patient needs the round, purple one (Patient taking differently: 1 lancet by Misc.(Non-Drug; Combo Route) route 4 (four) times daily. To use to check blood sugar 4 times daily. To use with insurance approved meter. Patient needs the round, purple one) 450 each 3    metoprolol tartrate (LOPRESSOR) 25 MG tablet Take 25 mg by mouth 2 (two) times daily.      minoxidiL (LONITEN) 2.5 MG tablet Take 5 mg by mouth 2 (two) times daily.      multivitamin (THERAGRAN) per tablet Take 1 tablet by mouth once daily.      pen needle, diabetic (BD ULTRA-FINE ROBERT PEN NEEDLE) 32 gauge x 5/32" Ndle To use 4 times per day with insulin injections. (Patient taking differently: 1 pen by Misc.(Non-Drug; Combo Route) route 4 (four) times daily. To use 4 times per day with insulin injections.) 450 each 2    sucroferric oxyhydroxide (VELPHORO) 500 mg Chew Take 500 mg by mouth 3 (three) times daily.      dextrose (GLUCOSE GEL) 40 % gel Take 37.5 mLs (15,000 mg total) by mouth once as needed (hypoglycemia). 37.5 g 4    gabapentin (NEURONTIN) 300 MG capsule Take 300 mg by mouth every evening.      glucagon (BAQSIMI) 3 mg/actuation Spry 3 mg (one actuation) into a single nostril; if no response, may repeat in 15 minutes using a new intranasal device. (Patient taking differently: 3 mg by Left Nostril route as needed. 3 mg (one actuation) into a single nostril; if no response, may repeat in 15 minutes using a new intranasal device.) 2 each 1    [DISCONTINUED] blood-glucose meter (ACCU-CHEK KAYLIE PLUS METER) Misc To use to check blood sugars 4 times a day 1 each 0    [DISCONTINUED] clorazepate (TRANXENE) 3.75 MG Tab Take 7.5 mg by mouth nightly as needed.       [DISCONTINUED] fenofibrate 160 MG Tab Take 1 tablet (160 mg total) by mouth once daily. 90 tablet 3    [DISCONTINUED] lancing device with lancets (ACCU-CHEK SOFT DEV LANCETS) Kit To check blood sugars 4 times per day 400 each 3     Scheduled " "Meds:   amLODIPine  10 mg Oral Daily    ascorbic acid (vitamin C)  500 mg Oral Daily    aspirin  81 mg Oral Daily    azelastine  1 spray Nasal BID    calcium acetate(phosphat bind)  667 mg Oral TID WM    cetirizine  10 mg Oral Every other day    collagenase   Topical (Top) Daily    DAPTOmycin (CUBICIN)  IV  500 mg Intravenous Q48H    epoetin chris-epbx  100 Units/kg Intravenous Every Mon, Wed, Fri    fluticasone propionate  2 spray Each Nostril Daily    insulin detemir U-100  20 Units Subcutaneous QHS    losartan  100 mg Oral Daily    multivitamin  1 tablet Oral Daily     Continuous Infusions:  PRN Meds:.sodium chloride, sodium chloride 0.9%, sodium chloride 0.9%, acetaminophen, benzonatate, dextrose 50%, dextrose 50%, heparin (porcine), hydrALAZINE, HYDROcodone-acetaminophen, HYDROcodone-acetaminophen, HYDROcodone-acetaminophen, insulin aspart U-100, naloxone, ondansetron, ondansetron, polyethylene glycol, promethazine, sodium chloride 0.9%, sodium chloride 0.9%, sodium chloride 0.9%, sodium chloride 0.9%    Review of Systems:    Physical Exam:    BP (!) 153/62   Pulse 69   Temp 97.7 °F (36.5 °C) (Oral)   Resp 18   Ht 5' 7" (1.702 m)   Wt 91.8 kg (202 lb 6.1 oz)   SpO2 98%   BMI 31.70 kg/m²     Physical Exam  Constitutional:       Appearance: She is well-developed. She is not diaphoretic.   HENT:      Head: Normocephalic and atraumatic.   Eyes:      General: No scleral icterus.     Pupils: Pupils are equal, round, and reactive to light.   Cardiovascular:      Rate and Rhythm: Normal rate and regular rhythm.   Pulmonary:      Effort: Pulmonary effort is normal. No respiratory distress.      Breath sounds: No stridor.   Abdominal:      General: There is no distension.      Palpations: Abdomen is soft.   Musculoskeletal:         General: No deformity. Normal range of motion.      Cervical back: Neck supple.   Skin:     General: Skin is warm and dry.      Findings: No erythema or rash. "   Neurological:      Mental Status: She is alert and oriented to person, place, and time.      Cranial Nerves: No cranial nerve deficit.   Psychiatric:         Behavior: Behavior normal.       Results:  Recent Labs   Lab 05/15/22  0439 05/16/22  0945 05/17/22  0609   * 129* 133*   K 4.5 4.8 4.6   CL 94* 90* 99   CO2 19* 19* 21*   BUN 88* 117* 71*   CREATININE 8.2* 10.3* 6.9*   ESTGFRAFRICA 5.7* 4.3* 7.0*   EGFRNONAA 5.0* 3.8* 6.1*   * 149* 113*       Recent Labs   Lab 05/15/22  0439 05/16/22  0945 05/17/22  0609   CALCIUM 9.4 9.4 9.1       Recent Labs   Lab 01/30/20  0705 03/10/22  0650   PTH, Intact 466.0 H 387.0 H       No results for input(s): POCTGLUCOSE in the last 168 hours.    Recent Labs   Lab 05/20/21  0500 03/10/22  0650 05/05/22  0320   Hemoglobin A1C 9.4 H >14.0 H 10.6 H       Recent Labs   Lab 05/15/22  0439 05/16/22  0945 05/17/22  0609   WBC 20.76* 17.78* 15.86*   HGB 9.6* 9.9* 9.4*   HCT 30.7* 31.9* 30.2*    504* 435   MCV 91 90 90   MCHC 31.3* 31.0* 31.1*   MONO 5.0 5.0 5.8  0.9       Recent Labs   Lab 05/19/21  0300   POC PH 7.273 L   POC PCO2 47.8 H   POC HCO3 22.1 L   POC PO2 22 LL   POC SATURATED O2 30 L   POC BE -5   Sample VENOUS     Patient care was time spent personally by me on the following activities: >  Min    · Obtaining a history  · Examination of patient.  · Providing medical care at the patients bedside.  · Developing a treatment plan with patient or surrogate and bedside caregivers  · Ordering and reviewing laboratory studies, radiographic studies, pulse oximetry.  · Ordering and performing treatments and interventions.  · Evaluation of patient's response to treatment.  · Discussions with consultants while on the unit and immediately available to the patient.  · Re-evaluation of the patient's condition.  · Documentation in the medical record.     Bryce Craig MD  [facility] [address] [id number] [phone]  (p) [phone] (f)

## 2022-05-17 NOTE — PT/OT/SLP PROGRESS
Occupational Therapy   Treatment    Name: Leanna García  MRN: [id number]  Admitting Diagnosis:  Bacteremia  4 Days Post-Op    Recommendations:     Discharge Recommendations: nursing facility, skilled  Discharge Equipment Recommendations:   (TBD)  Barriers to discharge:       Assessment:     Leanna García is a 56 y.o. female with a medical diagnosis of Bacteremia.  Pt agreeable to OT therapy session this AM. Performance deficits affecting function are weakness, impaired endurance, impaired self care skills, impaired functional mobilty, gait instability, impaired balance, pain, impaired skin, decreased upper extremity function, decreased lower extremity function, impaired cardiopulmonary response to activity, orthopedic precautions.     Rehab Prognosis:  Fair; patient would benefit from acute skilled OT services to address these deficits and reach maximum level of function.       Plan:     Patient to be seen 5 x/week to address the above listed problems via self-care/home management, therapeutic activities, therapeutic exercises  · Plan of Care Expires: 06/08/22  · Plan of Care Reviewed with: patient    Subjective     Pain/Comfort:  · Pain Rating 1: 0/10    Objective:     Communicated with: nursing prior to session.  Patient found HOB elevated with telemetry, peripheral IV, bed alarm, wound vac upon OT entry to room.    General Precautions: Standard, fall   Orthopedic Precautions:LLE non weight bearing   Braces: N/A  Respiratory Status: Room air     Occupational Performance:     Therapeutic Exercise:  BUE exercises 2 x 10 reps with red tband in all major planes with SBA (After initial demo from OT) and cues for correct technique; pt tolerated well    Treatment & Education:  Pt educated on role of OT/POC, importance of OOB/EOB activity, UE exercises, use of call bell, and safety during ADLs, transfers, and functional mobility.    Patient left HOB elevated with all lines intact, call button in reach, bed alarm on  and RN presentEducation:      GOALS:   Multidisciplinary Problems     Occupational Therapy Goals        Problem: Occupational Therapy    Goal Priority Disciplines Outcome Interventions   Occupational Therapy Goal     OT, PT/OT Ongoing, Progressing    Description: Goals to be met by: discharge     Patient will increase functional independence with ADLs by performing:    UE Dressing with Supervision.  LE Dressing with Supervision.  Grooming while seated with Supervision.  Toileting from toilet with Supervision for hygiene and clothing management.   Supine to sit with Supervision.  Toilet transfer to toilet with Supervision.  Perform sitting/standing ADL activity while maintaining LLE NWB with no verbal cues.                     Time Tracking:     OT Date of Treatment: 05/17/22  OT Start Time: 0945  OT Stop Time: 1004  OT Total Time (min): 19 min    Billable Minutes:Therapeutic Exercise 19    OT/HARVEY: OT          5/17/2022

## 2022-05-17 NOTE — PROGRESS NOTES
NMConsult Note  Infectious Disease    Reason for Consult:  Bacillus cereus bacteremia, and MRSA osteomyelitis     HPI: Leanna García is a 56 y.o. female known to me from prior consultation in September of 2018, was admitted through the emergency room yesterday with fever 103,   Vomiting of 1 day's duration, and chronic sinus congestion, postnasal drip, cough at least several weeks duration and a foot ulcer on the plantar/medial surface surface of the left foot of probably several months duration.  The vomiting began within a few hours of a meal at a [employer] restaurant.  She did not have any diarrhea In the emergency room she had a CT scan of the abdomen which suggested possible enteritis and bilateral perinephric fat stranding, as well as cholelithiasis, and extensive vascular calcifications.  her blood sugar was 446, white blood cells 21,000, normal lactic acid.  Photographs taken in the emergency room suggest cellulitis of the medial and plantar foot She was started on Zosyn and doxycycline as she has history of vancomycin allergy. .  She was seen by Podiatry , and MRI did not demonstrate any abscess or drainage and wound care was ordered.  She was noted this morning to have difficulty swallowing and was seen by speech therapy.  Today 1 anaerobic bottle has a Gram-positive mariana prompting this consult.    5/6: interim reviewed. Still febrile through last night. Blood culture does have characteristics of Bacillus cereus. This will be sent out to [facility] for MALDI ID. Respiratory pcr panel was negative. She was able to eat solid food this am. She denies abdominal pain. Podiatry debrided her foot this am and submitted cultures and is ordering a WBC Scan. Coughing less. No wheezes.     5/7 (Daren):  Interim reviewed, discussed with Dr Bailon.  Patient seen and examined at bedside, states she does not feel good today, feels like she got something that is making her feel off, not her usual self.  Labile BP  121/58, low-grade fever T-max 102° yesterday, currently 100.8.  Wound cultures from left foot grew Staph aureus, pending sensitivities.  She is currently on Daptomycin, Zosyn and levofloxacin IV.    5/8: Interim reviewed, patient seen and examined at bedside. Feeling significantly better compared to yesterday.  Having lunch at bedside.  Hemodynamically stable, T-max 103° yesterday, currently afebrile.  Labs reviewed, white count 17, PMN 79.2%, no bands.  H&H 7/23.3, platelet count 332. Sodium 135, potassium 4.4.  Status post further debridement at bedside today by Podiatry, large deep tissue abscess seen on the plantar facial below the flexor tendon.  Mild necrosis noted and all nonviable tissue was debrided, cultures in process.  Micro updated, confirmed Bacillus cereus bacteremia, wound culture from left foot grew MRSA.    5/9 (Adamaris):  Interim reviewed.  Blood culture isolate confirmed to be bacillus serious, likely from the restaurant that she ate before the onset of her illness.  Wound cultures from 05/16//8 have MRSA.  As she is allergic to vancomycin she is on daptomycin.  Echocardiogram negative temperature is improved since the drainage of her foot though she did have a 101 temperature last night.  White blood cells remain elevated, hemoglobin 6.7, she is receiving blood, CRP is down about 10%. Not getting out of bed. Sugary soft drinks on her bedside table.   5/10: interim reviewed. Fever has finally resolved. Received blood with dialysis yesterday. Foot cultures have grown MSSA and MRSA. Currently struggling with constipation, miserable, on bedpan.   5/11: low grade temp again today. Per RN, wound care did note some purulence yesterday in foot wound at the time of wound vac application yesterday. Diarrhea? after laxatives. WBC still very high, midline in place now.   5/12: still has fever, and leukocytosis, plans for debridement in OR noted. KUB normal.  She currently has no complaints, is eating well,  no diarrhea, much improved cough and congestion compared to admission.  No IV site phlebitis, no rashes, no thrush, no inflammation associated with her left arm AV fistula, no dysuria.  5/14:  Interim reviewed fever seems to have resolved.  Operative note from yesterday reviewed.  Cultures taken in the operating room yesterday are no growth so far.  Plans for discharge to skilled nursing at [facility] noted.  Having some pain from surgical site today.  She has a warm compress on it which gives her some relief.    EXAM & DIAGNOSTICS REVIEWED:     Vitals:     Temp:  [97.7 °F (36.5 °C)-99.6 °F (37.6 °C)]   Temp: 98.2 °F (36.8 °C) (05/17/22 1121)  Pulse: 67 (05/17/22 1121)  Resp: 18 (05/17/22 1217)  BP: (!) 168/69 (05/17/22 1121)  SpO2: 98 % (05/17/22 1121)    Intake/Output Summary (Last 24 hours) at 5/17/2022 1317  Last data filed at 5/17/2022 1200  Gross per 24 hour   Intake 1200 ml   Output --   Net 1200 ml        General:  In NAD. Alert and attentive, cooperative, comfortable on room air, completely nontoxic  Eyes:  Anicteric,  EOMI  ENT:  No ulcers, exudates, thrush, nares patent, edentulous  Neck:  Supple,   Lungs: Clear  Heart:  RRR,   Abd:  Soft, NT, non distended, normal BS, no guarding, no masses or organomegaly appreciated.  :  Voids  no flank tenderness  Musc:  Joints without effusion, swelling, erythema, synovitis but she does have lower extremity muscle wasting.  She has Charcot arthropathy with fallen arches bilaterally and an ulceration on the medial arch   Skin:  No rashes   Neuro: Alert, attentive, speech fluent, face symmetric, moves all extremities, no focal weakness.  Minimally Ambulatory at baseline  Psych:  Calm, cooperative  Lymphatic:        Extrem: Left foot with wound vac in place     No edema, erythema, phlebitis, warm and well perfused  VAD:  Left arm AV fistula  No redness , right arm midline    Isolation: contact - MRSA  Wound:           5/6:   This was debrided full  thickness this am    5/8:        5/9: bandage changed, packing not disturbed  5/11: wound vac in place  5/14:  Wound VAC in place, no new wound photos    5/17:              General Labs reviewed:  Recent Labs   Lab 05/15/22  0439 05/16/22  0945 05/17/22  0609   WBC 20.76* 17.78* 15.86*   HGB 9.6* 9.9* 9.4*   HCT 30.7* 31.9* 30.2*    504* 435       Recent Labs   Lab 05/15/22  0439 05/16/22  0945 05/17/22  0609   * 129* 133*   K 4.5 4.8 4.6   CL 94* 90* 99   CO2 19* 19* 21*   BUN 88* 117* 71*   CREATININE 8.2* 10.3* 6.9*   CALCIUM 9.4 9.4 9.1     Recent Labs   Lab 05/15/22  0439 05/16/22  0945 05/17/22  0609   CRP 18.24* 15.91* 14.32*         Micro:  Microbiology Results (last 7 days)     Procedure Component Value Units Date/Time    Culture, Anaerobic [[id number]] Collected: 05/13/22 0800    Order Status: Completed Specimen: Wound from Foot, Left Updated: 05/16/22 0715     Anaerobic Culture No anaerobes isolated    AFB Culture & Smear [[id number]] Collected: 05/13/22 0800    Order Status: Completed Specimen: Wound from Foot, Left Updated: 05/15/22 0848     AFB CULTURE STAIN No acid fast bacilli seen.     AFB CULTURE STAIN Testing performed by:     AFB CULTURE STAIN Lab [facility]     [place] [address] [facility] [place] [address]     AFB CULTURE STAIN Dr.Brian John MD    Gram stain [[id number]] Collected: 05/13/22 0800    Order Status: Completed Specimen: Wound from Foot, Left Updated: 05/15/22 0820     Gram Stain Result Rare WBC's      No organisms seen    Aerobic culture [[id number]] Collected: 05/13/22 0800    Order Status: Completed Specimen: Wound from Foot, Left Updated: 05/15/22 0743     Aerobic Bacterial Culture Skin kunal,  no predominant organism    Fungus culture [[id number]] Collected: 05/13/22 0800    Order Status: Sent Specimen: Wound from Foot, Left Updated: 05/13/22 1048    Blood culture [[id number]] Collected: 05/08/22 0723    Order Status:  Completed Specimen: Blood Updated: 05/13/22 1032     Blood Culture, Routine No growth after 5 days.    Narrative:      Collection has been rescheduled by SLR at 05/08/2022 06:15 Reason:   Unable to collect  Collection has been rescheduled by RE1 at 05/08/2022 06:55 Reason:   Unable to collect   Collection has been rescheduled by SLR at 05/08/2022 06:15 Reason:   Unable to collect  Collection has been rescheduled by RE1 at 05/08/2022 06:55 Reason:   Unable to collect     Culture, Anaerobic [[id number]] Collected: 05/08/22 1003    Order Status: Completed Specimen: Abscess from Foot, Left Updated: 05/11/22 1708     Anaerobic Culture No anaerobes isolated    Blood culture [[id number]] Collected: 05/06/22 1100    Order Status: Completed Specimen: Blood Updated: 05/11/22 1232     Blood Culture, Routine No growth after 5 days.    Narrative:      Collection has been rescheduled by RE1 at 05/06/2022 08:51 Reason:   Having a scan  Collection has been rescheduled by SLR at 05/06/2022 10:27 Reason:   Unable to collect  Collection has been rescheduled by RE1 at 05/06/2022 10:35 Reason:   Unable to collect  Collection has been rescheduled by RE1 at 05/06/2022 08:51 Reason:   Having a scan  Collection has been rescheduled by SLR at 05/06/2022 10:27 Reason:   Unable to collect  Collection has been rescheduled by RE1 at 05/06/2022 10:35 Reason:   Unable to collect          Imaging Reviewed:   CXR   CT abdomen and pelvis 5/4   1.  Small to moderate amount of fluid in the small intestines with scattered air fluid levels, possibly enteritis 2.  Moderate bilateral perinephric stranding. Correlate with urinalysis to exclude the possibility of pyelonephritis 3.  Gallbladder distention with gallstones     MRI of the left midfoot 5/5  IMPRESSION: 16mm skin ulceration in the medial plantar soft tissues with associated cellulitis. There is no abscess or osteomyelitis   Moderate degenerative changes of the midfoot    NM scan Abnormal indium-111  WBC uptake along medial left foot suggesting source of infection.     KUB 5/12 WNL    5/17 MRI foot  Since the reference examination of May 5, large soft tissue ulceration involving the medial plantar soft tissues has increased in size, and new smaller ulcerations have developed anteriorly and posteriorly.   Extensive surrounding soft tissue edema suggestive of cellulitis.   Irregular fluid collections involving medial great toe plantar soft tissues/intrinsic foot musculature, which could represent developing abscesses. Contrast enhanced imaging is recommended on follow-up.   No MRI evidence of osteomyelitis.   Moderate degenerative changes of the midfoot.    Cardiology:   ECHO 5/6/22  The left ventricle is normal in size with normal systolic function.  The estimated ejection fraction is 65%.  Normal left ventricular diastolic function.  Normal right ventricular size with normal right ventricular systolic function.  Mild mitral regurgitation.  All valves visualized, normal.    IMPRESSION & PLAN     1. Bacillus cereus bacteremia likely in the setting of recent Chinese food ingestion   Blood cultures 5/6 & 5/8 no growth, pending final   Echocardiogram negative   [facility] department notified    2. Left foot ulcer, deep tissue abscess/osteomyelitis status post debridement by Podiatry 5/6 and 5/8, deep pocket of pus drained 5/9, debridement in OR 5/13   Wound culture 5/6 MRSA and MSSA    3.  ESRD on HD,  Nephrology following    4.  Diabetes with hyperglycemia, A1c 14%  5. constipation     Recommendations:     Continue Daptomycin 500 mg IV q48h open (or after dialysis on dialysis days) for MRSA, MSSA and Bacillus cereus for another 5 weeks  Repeat CPK for mild elevation  Weekly cpk, hold statins while on daptomycin  Zosyn.  Today  No opposition to transfer to skilled nursing now     Needs aggressive glycemic control  D/w  Patient           Medical Decision Making during this encounter was  [_] Low Complexity  [_]  Moderate Complexity  [x  ] High Complexity

## 2022-05-17 NOTE — PT/OT/SLP PROGRESS
Physical Therapy Treatment    Patient Name:  Leanna García   MRN:  [id number]    Recommendations:     Discharge Recommendations:  nursing facility, skilled   Discharge Equipment Recommendations: other (see comments) (TBD at next level of care)   Barriers to discharge: increased assist needed with mobiltiy    Assessment:     Leanna García is a 56 y.o. female admitted with a medical diagnosis of Bacteremia.  She presents with the following impairments/functional limitations:  weakness, impaired endurance, impaired self care skills, impaired functional mobilty, gait instability, impaired balance, decreased lower extremity function, decreased upper extremity function, pain, impaired skin, impaired cardiopulmonary response to activity. Pt supine in bed upon PT arrival to room and just completed nursing procedure.  She is A & O x3 and agreeable to PT. Pt performs bed mobility with supervision only and has good motivation today for OOB activity.  Pt needs continued reinforcement of NWB status, attempts to place toes on floor for balance.  Tolerated transfers well and requires Delfina.  Will benefit from continued PT for gait, transfers, and therex.    Rehab Prognosis: Good; patient would benefit from acute skilled PT services to address these deficits and reach maximum level of function.    Recent Surgery: Procedure(s) (LRB):  INCISION AND DRAINAGE, FOOT (Left) 4 Days Post-Op    Plan:     During this hospitalization, patient to be seen 6 x/week to address the identified rehab impairments via gait training, therapeutic activities, therapeutic exercises and progress toward the following goals:    · Plan of Care Expires:  06/16/22    Subjective     Chief Complaint: I cant do anything in this room  Patient/Family Comments/goals: get better  Pain/Comfort:  · Pain Rating 1: other (see comments) (does not quantify)  · Location - Side 1: Left  · Location 1: foot  · Pain Addressed 1: Reposition, Distraction, Cessation of  Activity      Objective:     Communicated with RN prior to session.  Patient found supine with   upon PT entry to room.     General Precautions: Standard, fall   Orthopedic Precautions:LLE non weight bearing   Braces:    Respiratory Status: Room air     Functional Mobility:  · Bed Mobility:     · Supine to Sit: stand by assistance  · Sit to Supine: stand by assistance  · Transfers:     · Sit to Stand:  minimum assistance with rolling walker  · Bed to Chair: minimum assistance with  rolling walker  using  Step Transfer  · Gait: x5 steps with Delfina and RW      AM-PAC 6 CLICK MOBILITY  Turning over in bed (including adjusting bedclothes, sheets and blankets)?: 4  Sitting down on and standing up from a chair with arms (e.g., wheelchair, bedside commode, etc.): 2  Moving from lying on back to sitting on the side of the bed?: 4  Moving to and from a bed to a chair (including a wheelchair)?: 2  Need to walk in hospital room?: 2  Climbing 3-5 steps with a railing?: 1  Basic Mobility Total Score: 15       Therapeutic Activities and Exercises:   Pt was educated on the following: call light use, importance of OOB activity and functional mobility to negate the negative effects of prolonged bed rest during this hospitalization, safe transfers/ambulation and discharge planning recommendations/options.      Patient left supine with all lines intact, call button in reach, RN notified and wound care nurse present..    GOALS:   Multidisciplinary Problems     Physical Therapy Goals        Problem: Physical Therapy    Goal Priority Disciplines Outcome Goal Variances Interventions   Physical Therapy Goal     PT, PT/OT Ongoing, Progressing     Description: Goals to be met by: 22     Patient will increase functional independence with mobility by performin. Supine to sit with Modified [place]  2. Sit to supine with Modified [place]  3. Sit to stand transfer with Contact Guard Assistance  4. Bed to chair transfer with  Contact Guard Assistance using Rolling Walker maintaining NWB L LE  5. Wheelchair propulsion x150 feet with Supervision using bilateral uppper extremities                     Time Tracking:     PT Received On: 05/17/22  PT Start Time: 1113     PT Stop Time: 1136  PT Total Time (min): 23 min     Billable Minutes: Gait Training 11 and Therapeutic Activity 12    Treatment Type: Treatment  PT/PTA: PT     PTA Visit Number: 0     05/17/2022

## 2022-05-18 LAB
ANION GAP SERPL CALC-SCNC: 16 MMOL/L (ref 8–16)
BASOPHILS # BLD AUTO: 0.04 K/UL (ref 0–0.2)
BASOPHILS NFR BLD: 0.3 % (ref 0–1.9)
BUN SERPL-MCNC: 89 MG/DL (ref 6–20)
CALCIUM SERPL-MCNC: 9.6 MG/DL (ref 8.7–10.5)
CHLORIDE SERPL-SCNC: 97 MMOL/L (ref 95–110)
CO2 SERPL-SCNC: 22 MMOL/L (ref 23–29)
CREAT SERPL-MCNC: 8.7 MG/DL (ref 0.5–1.4)
CRP SERPL-MCNC: 13.28 MG/DL
DIFFERENTIAL METHOD: ABNORMAL
EOSINOPHIL # BLD AUTO: 0.3 K/UL (ref 0–0.5)
EOSINOPHIL NFR BLD: 1.8 % (ref 0–8)
ERYTHROCYTE [DISTWIDTH] IN BLOOD BY AUTOMATED COUNT: 16 % (ref 11.5–14.5)
EST. GFR  (AFRICAN AMERICAN): 5.3 ML/MIN/1.73 M^2
EST. GFR  (NON AFRICAN AMERICAN): 4.6 ML/MIN/1.73 M^2
GLUCOSE SERPL-MCNC: 121 MG/DL (ref 70–110)
GLUCOSE SERPL-MCNC: 152 MG/DL (ref 70–110)
GLUCOSE SERPL-MCNC: 181 MG/DL (ref 70–110)
GLUCOSE SERPL-MCNC: 214 MG/DL (ref 70–110)
GLUCOSE SERPL-MCNC: 247 MG/DL (ref 70–110)
HCT VFR BLD AUTO: 30.2 % (ref 37–48.5)
HGB BLD-MCNC: 9.2 G/DL (ref 12–16)
IMM GRANULOCYTES # BLD AUTO: 0.28 K/UL (ref 0–0.04)
IMM GRANULOCYTES NFR BLD AUTO: 2 % (ref 0–0.5)
LYMPHOCYTES # BLD AUTO: 2.1 K/UL (ref 1–4.8)
LYMPHOCYTES NFR BLD: 14.9 % (ref 18–48)
MCH RBC QN AUTO: 27.6 PG (ref 27–31)
MCHC RBC AUTO-ENTMCNC: 30.5 G/DL (ref 32–36)
MCV RBC AUTO: 91 FL (ref 82–98)
MONOCYTES # BLD AUTO: 1 K/UL (ref 0.3–1)
MONOCYTES NFR BLD: 7.1 % (ref 4–15)
NEUTROPHILS # BLD AUTO: 10.3 K/UL (ref 1.8–7.7)
NEUTROPHILS NFR BLD: 73.9 % (ref 38–73)
NRBC BLD-RTO: 0 /100 WBC
PLATELET # BLD AUTO: 425 K/UL (ref 150–450)
PMV BLD AUTO: 9.4 FL (ref 9.2–12.9)
POTASSIUM SERPL-SCNC: 5.3 MMOL/L (ref 3.5–5.1)
RBC # BLD AUTO: 3.33 M/UL (ref 4–5.4)
SODIUM SERPL-SCNC: 135 MMOL/L (ref 136–145)
WBC # BLD AUTO: 13.99 K/UL (ref 3.9–12.7)

## 2022-05-18 PROCEDURE — 99231 PR SUBSEQUENT HOSPITAL CARE,LEVL I: ICD-10-PCS | Mod: ,,, | Performed by: INTERNAL MEDICINE

## 2022-05-18 PROCEDURE — 90935 HEMODIALYSIS ONE EVALUATION: CPT

## 2022-05-18 PROCEDURE — 85025 COMPLETE CBC W/AUTO DIFF WBC: CPT | Performed by: NURSE PRACTITIONER

## 2022-05-18 PROCEDURE — 63600175 PHARM REV CODE 636 W HCPCS: Performed by: INTERNAL MEDICINE

## 2022-05-18 PROCEDURE — 86140 C-REACTIVE PROTEIN: CPT | Performed by: INTERNAL MEDICINE

## 2022-05-18 PROCEDURE — 94761 N-INVAS EAR/PLS OXIMETRY MLT: CPT

## 2022-05-18 PROCEDURE — 25000003 PHARM REV CODE 250: Performed by: INTERNAL MEDICINE

## 2022-05-18 PROCEDURE — 99231 SBSQ HOSP IP/OBS SF/LOW 25: CPT | Mod: ,,, | Performed by: INTERNAL MEDICINE

## 2022-05-18 PROCEDURE — 97535 SELF CARE MNGMENT TRAINING: CPT

## 2022-05-18 PROCEDURE — 99232 PR SUBSEQUENT HOSPITAL CARE,LEVL II: ICD-10-PCS | Mod: ,,, | Performed by: PODIATRIST

## 2022-05-18 PROCEDURE — 36415 COLL VENOUS BLD VENIPUNCTURE: CPT | Performed by: NURSE PRACTITIONER

## 2022-05-18 PROCEDURE — 80048 BASIC METABOLIC PNL TOTAL CA: CPT | Performed by: NURSE PRACTITIONER

## 2022-05-18 PROCEDURE — 12000002 HC ACUTE/MED SURGE SEMI-PRIVATE ROOM

## 2022-05-18 PROCEDURE — 63600175 PHARM REV CODE 636 W HCPCS: Mod: JG | Performed by: INTERNAL MEDICINE

## 2022-05-18 PROCEDURE — 25000003 PHARM REV CODE 250: Performed by: NURSE PRACTITIONER

## 2022-05-18 PROCEDURE — 99232 SBSQ HOSP IP/OBS MODERATE 35: CPT | Mod: ,,, | Performed by: PODIATRIST

## 2022-05-18 RX ORDER — LACTULOSE 10 G/15ML
20 SOLUTION ORAL 2 TIMES DAILY PRN
Status: DISCONTINUED | OUTPATIENT
Start: 2022-05-18 | End: 2022-05-19 | Stop reason: HOSPADM

## 2022-05-18 RX ORDER — HYDROMORPHONE HYDROCHLORIDE 1 MG/ML
0.5 INJECTION, SOLUTION INTRAMUSCULAR; INTRAVENOUS; SUBCUTANEOUS EVERY 6 HOURS PRN
Status: DISCONTINUED | OUTPATIENT
Start: 2022-05-18 | End: 2022-05-18

## 2022-05-18 RX ORDER — MORPHINE SULFATE 4 MG/ML
4 INJECTION, SOLUTION INTRAMUSCULAR; INTRAVENOUS EVERY 6 HOURS PRN
Status: DISCONTINUED | OUTPATIENT
Start: 2022-05-18 | End: 2022-05-19 | Stop reason: HOSPADM

## 2022-05-18 RX ADMIN — AMLODIPINE BESYLATE 10 MG: 5 TABLET ORAL at 08:05

## 2022-05-18 RX ADMIN — EPOETIN ALFA-EPBX 9000 UNITS: 10000 INJECTION, SOLUTION INTRAVENOUS; SUBCUTANEOUS at 11:05

## 2022-05-18 RX ADMIN — CALCIUM ACETATE 667 MG: 667 CAPSULE ORAL at 08:05

## 2022-05-18 RX ADMIN — PIPERACILLIN AND TAZOBACTAM 4.5 G: 4; .5 INJECTION, POWDER, LYOPHILIZED, FOR SOLUTION INTRAVENOUS; PARENTERAL at 03:05

## 2022-05-18 RX ADMIN — CALCIUM ACETATE 667 MG: 667 CAPSULE ORAL at 05:05

## 2022-05-18 RX ADMIN — HYDROCODONE BITARTRATE AND ACETAMINOPHEN 1 TABLET: 5; 325 TABLET ORAL at 09:05

## 2022-05-18 RX ADMIN — THERA TABS 1 TABLET: TAB at 08:05

## 2022-05-18 RX ADMIN — INSULIN ASPART 2 UNITS: 100 INJECTION, SOLUTION INTRAVENOUS; SUBCUTANEOUS at 09:05

## 2022-05-18 RX ADMIN — AZELASTINE HYDROCHLORIDE 137 MCG: 137 SPRAY, METERED NASAL at 09:05

## 2022-05-18 RX ADMIN — DAPTOMYCIN 500 MG: 500 INJECTION, POWDER, LYOPHILIZED, FOR SOLUTION INTRAVENOUS at 09:05

## 2022-05-18 RX ADMIN — INSULIN DETEMIR 20 UNITS: 100 INJECTION, SOLUTION SUBCUTANEOUS at 09:05

## 2022-05-18 RX ADMIN — CALCIUM ACETATE 667 MG: 667 CAPSULE ORAL at 01:05

## 2022-05-18 RX ADMIN — COLLAGENASE SANTYL: 250 OINTMENT TOPICAL at 08:05

## 2022-05-18 RX ADMIN — LOSARTAN POTASSIUM 100 MG: 50 TABLET, FILM COATED ORAL at 08:05

## 2022-05-18 RX ADMIN — MORPHINE SULFATE 4 MG: 4 INJECTION, SOLUTION INTRAMUSCULAR; INTRAVENOUS at 03:05

## 2022-05-18 RX ADMIN — FLUTICASONE PROPIONATE 100 MCG: 50 SPRAY, METERED NASAL at 08:05

## 2022-05-18 RX ADMIN — OXYCODONE HYDROCHLORIDE AND ACETAMINOPHEN 500 MG: 500 TABLET ORAL at 08:05

## 2022-05-18 RX ADMIN — AZELASTINE HYDROCHLORIDE 137 MCG: 137 SPRAY, METERED NASAL at 08:05

## 2022-05-18 RX ADMIN — HYDROCODONE BITARTRATE AND ACETAMINOPHEN 1 TABLET: 5; 325 TABLET ORAL at 10:05

## 2022-05-18 RX ADMIN — INSULIN ASPART 1 UNITS: 100 INJECTION, SOLUTION INTRAVENOUS; SUBCUTANEOUS at 08:05

## 2022-05-18 RX ADMIN — INSULIN ASPART 2 UNITS: 100 INJECTION, SOLUTION INTRAVENOUS; SUBCUTANEOUS at 05:05

## 2022-05-18 RX ADMIN — ASPIRIN 81 MG: 81 TABLET, COATED ORAL at 08:05

## 2022-05-18 NOTE — SUBJECTIVE & OBJECTIVE
Interval History:     Review of Systems   Constitutional:  Negative for activity change and appetite change.   HENT:  Negative for congestion and dental problem.    Eyes:  Negative for discharge and itching.   Respiratory:  Negative for shortness of breath.    Cardiovascular:  Negative for chest pain.   Gastrointestinal:  Negative for abdominal distention and abdominal pain.   Endocrine: Negative for cold intolerance.   Genitourinary:  Negative for difficulty urinating and dysuria.   Musculoskeletal:  Positive for arthralgias. Negative for back pain.   Skin:  Negative for color change.   Neurological:  Negative for dizziness and facial asymmetry.   Hematological:  Negative for adenopathy.   Psychiatric/Behavioral:  Negative for agitation and behavioral problems.    Objective:     Vital Signs (Most Recent):  Temp: 97.3 °F (36.3 °C) (05/18/22 1130)  Pulse: 72 (05/18/22 1230)  Resp: 18 (05/18/22 1043)  BP: 138/67 (05/18/22 1230)  SpO2: 97 % (05/18/22 0910) Vital Signs (24h Range):  Temp:  [97.3 °F (36.3 °C)-99 °F (37.2 °C)] 97.3 °F (36.3 °C)  Pulse:  [61-76] 72  Resp:  [18-19] 18  SpO2:  [95 %-98 %] 97 %  BP: (111-214)/(49-85) 138/67     Weight: 91.8 kg (202 lb 6.1 oz)  Body mass index is 31.7 kg/m².    Intake/Output Summary (Last 24 hours) at 5/18/2022 1311  Last data filed at 5/18/2022 1230  Gross per 24 hour   Intake 500 ml   Output 2500 ml   Net -2000 ml      Physical Exam  Vitals and nursing note reviewed.   Constitutional:       General: She is not in acute distress.  HENT:      Head: Atraumatic.      Right Ear: External ear normal.      Left Ear: External ear normal.      Nose: Nose normal.      Mouth/Throat:      Mouth: Mucous membranes are moist.   Eyes:      General: No scleral icterus.  Cardiovascular:      Rate and Rhythm: Normal rate.   Pulmonary:      Effort: Pulmonary effort is normal.   Abdominal:      General: There is no distension.   Musculoskeletal:      Cervical back: Normal range of motion.       Comments: L foot bandage intact   Skin:     General: Skin is warm.   Neurological:      Mental Status: She is alert and oriented to person, place, and time.       Significant Labs: All pertinent labs within the past 24 hours have been reviewed.  CBC:   Recent Labs   Lab 05/17/22  0609 05/18/22  0445   WBC 15.86* 13.99*   HGB 9.4* 9.2*   HCT 30.2* 30.2*    425     CMP:   Recent Labs   Lab 05/17/22  0609 05/18/22  0445   * 135*   K 4.6 5.3*   CL 99 97   CO2 21* 22*   * 152*   BUN 71* 89*   CREATININE 6.9* 8.7*   CALCIUM 9.1 9.6   ANIONGAP 13 16   EGFRNONAA 6.1* 4.6*       Significant Imaging: I have reviewed all pertinent imaging results/findings within the past 24 hours.

## 2022-05-18 NOTE — CONSULTS
Wound vac replaced, foot red.  Cleaned and dried, skin prep applied, draped and framed to decrease moisture.  Bridged 4 wounds together and tracked to foot.

## 2022-05-18 NOTE — ASSESSMENT & PLAN NOTE
S/p Incision/drainage/debridement of  Left foot deep tissue abscess below fascia muscle and tendon  Again had Incision and drainage below fascia of multiple abscesses left foot on 05/13  MRI done on 05/17 showed large soft tissue ulceration involving the medial plantar soft tissues has increased in size, and new smaller ulcerations have developed anteriorly and posteriorly.   Also Irregular fluid collections involving medial great toe plantar soft tissues/intrinsic foot musculature, which could represent developing abscesses.

## 2022-05-18 NOTE — PROGRESS NOTES
INPATIENT NEPHROLOGY Progress Note  [facility] NEPHROLOGY    Leanna García  05/18/2022    Reason for consultation:  ESRD    Chief Complaint:   Chief Complaint   Patient presents with    Fever     History of Present Illness:  Ms. García is a 56-year-old female with a past medical history of ESRD on HD Monday Wednesday Friday, hypertension, IDDM2, PAF, and chronic cough who presents today with complaints of fever up to 103. It is severe.  It is associated with cough, posttussive vomiting, fatigue, sinus congestion, weakness, and a left foot ulcer.  She denies chest pain, diarrhea, dizziness, loss of consciousness.  In the ED she was noted to have a large left foot ulceration at the plantar surface and on the side of the foot.  CT of abdomen pelvis also reveals possible enteritis and bilateral perinephric fat stranding.  WBCs 21 K, troponin is 0.4 in the setting of ESRD.  She did not go to dialysis today she felt too bad.  Glucose is 446, anion gap 20, bicarb 24.     5/5  C/o foot pain.  Mild dyspnea.  No vomiting, chest pain, urinary or bowel complaints.  Weak  5/6  Currently getting wound vac placed.  Has foot pain. No respiratory distress  5/7 still spiking fevers.  Foot pain.  Stopped hd early yesterday  5/8  Tired today.  No vomiting or sob.  Tmax 101.9 at 1900 yesterday  5/9 wound vac today  5/10 no issues with HD yest- got 1u of blood- net UF 2.6L- waiting for wound vac- gave ok with PICC  5/11 febrile, BP stable, on 2L NC- seen on HD- no complaints; has wound vac; looking to place at [facility]  5/12 plan for [facility]  5/13 went for I&D of L foot abscesses   5/14 no events overnight  5/15 no events overnight- awaiting SNF  5/16 VSS, no new complains. HD today.  5/17 VSS, HD in AM.  5/18 VSS, HD today.    Plan of Care:     ESRD on HD MWF  --continue dialysis per routine    Hypertension  --low salt diet 2g/day  --uf with hd    Hyponatremia  --fluid restrict 1.5L/day  --140 Na dialysate    Secondary  HPT  --continue binder with meals     Anemia of CKD  --Hgb is better after transfusion on 5/9- stabilizing  --continue FERMÍN with HD    Diab Foot Ulcer- MRSA  --podiatry and ID following, getting procedures PRN and abx  --wound vac in place  --dose antbx for CrCl< 10/HD    Thank you for allowing us to participate in this patient's care. We will continue to follow.    Vital Signs:  Temp Readings from Last 3 Encounters:   05/18/22 97.9 °F (36.6 °C) (Oral)   04/26/22 98.2 °F (36.8 °C)   03/10/22 97.3 °F (36.3 °C) (Tympanic)       Pulse Readings from Last 3 Encounters:   05/18/22 65   04/26/22 66   04/05/22 75       BP Readings from Last 3 Encounters:   05/18/22 (!) 181/73   04/28/22 (!) 180/82   04/26/22 (!) 174/66       Weight:  Wt Readings from Last 3 Encounters:   05/16/22 91.8 kg (202 lb 6.1 oz)   05/06/22 86.6 kg (191 lb)   04/28/22 94 kg (207 lb 5.5 oz)       Medications:  No current facility-administered medications on file prior to encounter.     Current Outpatient Medications on File Prior to Encounter   Medication Sig Dispense Refill    ascorbic acid, vitamin C, (VITAMIN C) 500 MG tablet Take 500 mg by mouth once daily.      aspirin (ECOTRIN) 81 MG EC tablet Take 81 mg by mouth once daily.      atorvastatin (LIPITOR) 80 MG tablet Take 1 tablet (80 mg total) by mouth once daily. (Patient taking differently: Take 80 mg by mouth every evening.) 90 tablet 3    blood sugar diagnostic Strp To check BG 4 times daily, to use with insurance preferred meter (Patient taking differently: 1 strip by Misc.(Non-Drug; Combo Route) route 4 (four) times daily. To check BG 4 times daily, to use with insurance preferred meter) 450 strip 3    insulin aspart U-100 (NOVOLOG FLEXPEN U-100 INSULIN) 100 unit/mL (3 mL) InPn pen Inject 5-8 units 3 times per day with food. 1 Box 6    insulin detemir U-100 (LEVEMIR FLEXTOUCH U-100 INSULN) 100 unit/mL (3 mL) InPn pen Inject 15-18 Units into the skin once daily. 1 Box 6    lancets Misc  "To use to check blood sugar 4 times daily. To use with insurance approved meter. Patient needs the round, purple one (Patient taking differently: 1 lancet by Misc.(Non-Drug; Combo Route) route 4 (four) times daily. To use to check blood sugar 4 times daily. To use with insurance approved meter. Patient needs the round, purple one) 450 each 3    metoprolol tartrate (LOPRESSOR) 25 MG tablet Take 25 mg by mouth 2 (two) times daily.      minoxidiL (LONITEN) 2.5 MG tablet Take 5 mg by mouth 2 (two) times daily.      multivitamin (THERAGRAN) per tablet Take 1 tablet by mouth once daily.      pen needle, diabetic (BD ULTRA-FINE ROBERT PEN NEEDLE) 32 gauge x 5/32" Ndle To use 4 times per day with insulin injections. (Patient taking differently: 1 pen by Misc.(Non-Drug; Combo Route) route 4 (four) times daily. To use 4 times per day with insulin injections.) 450 each 2    sucroferric oxyhydroxide (VELPHORO) 500 mg Chew Take 500 mg by mouth 3 (three) times daily.      dextrose (GLUCOSE GEL) 40 % gel Take 37.5 mLs (15,000 mg total) by mouth once as needed (hypoglycemia). 37.5 g 4    gabapentin (NEURONTIN) 300 MG capsule Take 300 mg by mouth every evening.      glucagon (BAQSIMI) 3 mg/actuation Spry 3 mg (one actuation) into a single nostril; if no response, may repeat in 15 minutes using a new intranasal device. (Patient taking differently: 3 mg by Left Nostril route as needed. 3 mg (one actuation) into a single nostril; if no response, may repeat in 15 minutes using a new intranasal device.) 2 each 1    [DISCONTINUED] blood-glucose meter (ACCU-CHEK KAYLIE PLUS METER) Misc To use to check blood sugars 4 times a day 1 each 0    [DISCONTINUED] clorazepate (TRANXENE) 3.75 MG Tab Take 7.5 mg by mouth nightly as needed.       [DISCONTINUED] fenofibrate 160 MG Tab Take 1 tablet (160 mg total) by mouth once daily. 90 tablet 3    [DISCONTINUED] lancing device with lancets (ACCU-CHEK SOFT DEV LANCETS) Kit To check blood sugars " "4 times per day 400 each 3     Scheduled Meds:   amLODIPine  10 mg Oral Daily    ascorbic acid (vitamin C)  500 mg Oral Daily    aspirin  81 mg Oral Daily    azelastine  1 spray Nasal BID    calcium acetate(phosphat bind)  667 mg Oral TID WM    cetirizine  10 mg Oral Every other day    collagenase   Topical (Top) Daily    DAPTOmycin (CUBICIN)  IV  500 mg Intravenous Q48H    epoetin chris-epbx  100 Units/kg Intravenous Every Mon, Wed, Fri    fluticasone propionate  2 spray Each Nostril Daily    insulin detemir U-100  20 Units Subcutaneous QHS    losartan  100 mg Oral Daily    multivitamin  1 tablet Oral Daily    piperacillin-tazobactam (ZOSYN) IVPB  4.5 g Intravenous Q12H     Continuous Infusions:  PRN Meds:.sodium chloride, sodium chloride 0.9%, sodium chloride 0.9%, acetaminophen, ALPRAZolam, benzonatate, dextrose 50%, dextrose 50%, heparin (porcine), hydrALAZINE, HYDROcodone-acetaminophen, insulin aspart U-100, naloxone, ondansetron, ondansetron, polyethylene glycol, promethazine, sodium chloride 0.9%, sodium chloride 0.9%, sodium chloride 0.9%, sodium chloride 0.9%    Review of Systems:    Physical Exam:    BP (!) 181/73   Pulse 65   Temp 97.9 °F (36.6 °C) (Oral)   Resp 18   Ht 5' 7" (1.702 m)   Wt 91.8 kg (202 lb 6.1 oz)   SpO2 97%   BMI 31.70 kg/m²     Physical Exam  Constitutional:       Appearance: She is well-developed. She is not diaphoretic.   HENT:      Head: Normocephalic and atraumatic.   Eyes:      General: No scleral icterus.     Pupils: Pupils are equal, round, and reactive to light.   Cardiovascular:      Rate and Rhythm: Normal rate and regular rhythm.   Pulmonary:      Effort: Pulmonary effort is normal. No respiratory distress.      Breath sounds: No stridor.   Abdominal:      General: There is no distension.      Palpations: Abdomen is soft.   Musculoskeletal:         General: No deformity. Normal range of motion.      Cervical back: Neck supple.   Skin:     General: Skin is " warm and dry.      Findings: No erythema or rash.   Neurological:      Mental Status: She is alert and oriented to person, place, and time.      Cranial Nerves: No cranial nerve deficit.   Psychiatric:         Behavior: Behavior normal.       Results:  Recent Labs   Lab 05/16/22  0945 05/17/22  0609 05/18/22  0445   * 133* 135*   K 4.8 4.6 5.3*   CL 90* 99 97   CO2 19* 21* 22*   * 71* 89*   CREATININE 10.3* 6.9* 8.7*   ESTGFRAFRICA 4.3* 7.0* 5.3*   EGFRNONAA 3.8* 6.1* 4.6*   * 113* 152*       Recent Labs   Lab 05/16/22  0945 05/17/22  0609 05/18/22  0445   CALCIUM 9.4 9.1 9.6       Recent Labs   Lab 01/30/20  0705 03/10/22  0650   PTH, Intact 466.0 H 387.0 H       No results for input(s): POCTGLUCOSE in the last 168 hours.    Recent Labs   Lab 05/20/21  0500 03/10/22  0650 05/05/22  0320   Hemoglobin A1C 9.4 H >14.0 H 10.6 H       Recent Labs   Lab 05/16/22  0945 05/17/22  0609 05/18/22  0445   WBC 17.78* 15.86* 13.99*   HGB 9.9* 9.4* 9.2*   HCT 31.9* 30.2* 30.2*   * 435 425   MCV 90 90 91   MCHC 31.0* 31.1* 30.5*   MONO 5.0 5.8  0.9 7.1  1.0       Recent Labs   Lab 05/19/21  0300   POC PH 7.273 L   POC PCO2 47.8 H   POC HCO3 22.1 L   POC PO2 22 LL   POC SATURATED O2 30 L   POC BE -5   Sample VENOUS     Patient care was time spent personally by me on the following activities: >  Min    · Obtaining a history  · Examination of patient.  · Providing medical care at the patients bedside.  · Developing a treatment plan with patient or surrogate and bedside caregivers  · Ordering and reviewing laboratory studies, radiographic studies, pulse oximetry.  · Ordering and performing treatments and interventions.  · Evaluation of patient's response to treatment.  · Discussions with consultants while on the unit and immediately available to the patient.  · Re-evaluation of the patient's condition.  · Documentation in the medical record.     Bryce Craig MD  [facility]  [address] [place] [address]  [phone] (p)  [phone] (f)

## 2022-05-18 NOTE — PROGRESS NOTES
[facility] Medicine  Progress Note    Patient Name: Leanna García  MRN: [id number]  Patient Class: IP- Inpatient   Admission Date: 5/4/2022  Length of Stay: 14 days  Attending Physician: Usman Stacy MD  Primary Care Provider: Stefano Lopez MD        Subjective:     Principal Problem:Bacteremia        HPI:  Ms. García is a 56-year-old female with a past medical history of ESRD on HD Monday Wednesday Friday, hypertension, IDDM2, PAF, and chronic cough who presents today with complaints of fever up to 103. It is severe.  It is associated with cough, posttussive vomiting, fatigue, sinus congestion, weakness, and a left foot ulcer.  She denies chest pain, diarrhea, dizziness, loss of consciousness.  In the ED she was noted to have a large left foot ulceration at the plantar surface and on the side of the foot.  CT of abdomen pelvis also reveals possible enteritis and bilateral perinephric fat stranding.  WBCs 21 K, troponin is 0.4 in the setting of ESRD.  She did not go to dialysis today she felt too bad.  Glucose is 446, anion gap 20, bicarb 24.       Overview/Hospital Course:  05/05  Had HD today  MRI r/o osteomyelitis  Afebrile now    05/06  Had febrile episodes overnight  [place] [facility] [place] [facility]  Had dialysis today  Awaiting WBC scan     05/07  WBC scan showed medial left foot infection  C/o extreme fatigue/weakness    05/08  Pt having on and off febrile episodes at night  Pt today had bedside debridement of L foot abscess     05/09  Pt had HD today  Febrile episodes eprsists  Receive done unit of blood     05/10  Overall much better except for constipation  Awaiting insurance approval for LTAC placement   Otherwise stable     05/11  LTAC stay denied by insurance  Insurance approved SNF placement  Medically stable     05/12  Pt still having febrile episodes  WBC levels high  Pt c/o pain on R foot area    05/13  Pt today had Incision and drainage below fascia of multiple  abscesses left foot  Postoperatively drowsy   Had HD today     05/14  No new issues  Awaiting SNF placement     05/15  Awaiting SNF placement  Medically stable    05/16  Awaiting SNF placement  Medically stable    05/17  MRI done on 05/17 showed large soft tissue ulceration involving the medial plantar soft tissues has increased in size, and new smaller ulcerations have developed anteriorly and posteriorly.   Also Irregular fluid collections involving medial great toe plantar soft tissues/intrinsic foot musculature, which could represent developing abscesses.     05/18  Pt wants to eat  Having dialysis today      Interval History:     Review of Systems   Constitutional:  Negative for activity change and appetite change.   HENT:  Negative for congestion and dental problem.    Eyes:  Negative for discharge and itching.   Respiratory:  Negative for shortness of breath.    Cardiovascular:  Negative for chest pain.   Gastrointestinal:  Negative for abdominal distention and abdominal pain.   Endocrine: Negative for cold intolerance.   Genitourinary:  Negative for difficulty urinating and dysuria.   Musculoskeletal:  Positive for arthralgias. Negative for back pain.   Skin:  Negative for color change.   Neurological:  Negative for dizziness and facial asymmetry.   Hematological:  Negative for adenopathy.   Psychiatric/Behavioral:  Negative for agitation and behavioral problems.    Objective:     Vital Signs (Most Recent):  Temp: 97.3 °F (36.3 °C) (05/18/22 1130)  Pulse: 72 (05/18/22 1230)  Resp: 18 (05/18/22 1043)  BP: 138/67 (05/18/22 1230)  SpO2: 97 % (05/18/22 0910) Vital Signs (24h Range):  Temp:  [97.3 °F (36.3 °C)-99 °F (37.2 °C)] 97.3 °F (36.3 °C)  Pulse:  [61-76] 72  Resp:  [18-19] 18  SpO2:  [95 %-98 %] 97 %  BP: (111-214)/(49-85) 138/67     Weight: 91.8 kg (202 lb 6.1 oz)  Body mass index is 31.7 kg/m².    Intake/Output Summary (Last 24 hours) at 5/18/2022 1311  Last data filed at 5/18/2022 1230  Gross per 24  hour   Intake 500 ml   Output 2500 ml   Net -2000 ml      Physical Exam  Vitals and nursing note reviewed.   Constitutional:       General: She is not in acute distress.  HENT:      Head: Atraumatic.      Right Ear: External ear normal.      Left Ear: External ear normal.      Nose: Nose normal.      Mouth/Throat:      Mouth: Mucous membranes are moist.   Eyes:      General: No scleral icterus.  Cardiovascular:      Rate and Rhythm: Normal rate.   Pulmonary:      Effort: Pulmonary effort is normal.   Abdominal:      General: There is no distension.   Musculoskeletal:      Cervical back: Normal range of motion.      Comments: L foot bandage intact   Skin:     General: Skin is warm.   Neurological:      Mental Status: She is alert and oriented to person, place, and time.       Significant Labs: All pertinent labs within the past 24 hours have been reviewed.  CBC:   Recent Labs   Lab 05/17/22  0609 05/18/22  0445   WBC 15.86* 13.99*   HGB 9.4* 9.2*   HCT 30.2* 30.2*    425     CMP:   Recent Labs   Lab 05/17/22  0609 05/18/22  0445   * 135*   K 4.6 5.3*   CL 99 97   CO2 21* 22*   * 152*   BUN 71* 89*   CREATININE 6.9* 8.7*   CALCIUM 9.1 9.6   ANIONGAP 13 16   EGFRNONAA 6.1* 4.6*       Significant Imaging: I have reviewed all pertinent imaging results/findings within the past 24 hours.      Assessment/Plan:      * Bacteremia  Gram positive rods on BC X 1 :Bacillus cereus   Had enteritis/Food poisoning which has been resolved(ate chinese food before onset of symptoms)  Repeat Blood cultures so far normal  S/p Incision/drainage/debridement of  Left foot deep tissue abscess below fascia muscle and tendon  Again had Incision and drainage below fascia of multiple abscesses left foot on 05/13  Continue Daptomycin 500 mg IV q48h for 5 weeks  Also pt on iv zosyn    MRI done on 05/17 showed large soft tissue ulceration involving the medial plantar soft tissues has increased in size, and new smaller ulcerations  have developed anteriorly and posteriorly.   Also Irregular fluid collections involving medial great toe plantar soft tissues/intrinsic foot musculature, which could represent developing abscesses.     Sepsis  Bacillus cereus bacteremia along with L foot abscess       Abscess of left foot  S/p Incision/drainage/debridement of  Left foot deep tissue abscess below fascia muscle and tendon  Again had Incision and drainage below fascia of multiple abscesses left foot on 05/13  MRI done on 05/17 showed large soft tissue ulceration involving the medial plantar soft tissues has increased in size, and new smaller ulcerations have developed anteriorly and posteriorly.   Also Irregular fluid collections involving medial great toe plantar soft tissues/intrinsic foot musculature, which could represent developing abscesses.     Diabetic ulcer of left midfoot  Wound care  MRI r/o osteomyelitis  WBC scan showed medial left foot as source of infection  S/p Incision/drainage/debridement of  Left foot deep tissue abscess below fascia muscle and tendon  Again had Incision and drainage below fascia of multiple abscesses left foot on 05/13  MRI done on 05/17 showed large soft tissue ulceration involving the medial plantar soft tissues has increased in size, and new smaller ulcerations have developed anteriorly and posteriorly.   Also Irregular fluid collections involving medial great toe plantar soft tissues/intrinsic foot musculature, which could represent developing abscesses.     Discharge planning issues  LTAC stay denied by insurance  Pt already had L foot abscess debridement x 2   May need further foot abscess exploration again before she goes to SNF       Chronic anemia  pRBC if needed along with Dialysis sessions  Received one unit of blood on 05/09      Diabetic foot infection  As above         Foot infection  As above       Cough  Symptomatic management      Acute bronchitis  Symptomatic management      Elevated  troponin  Likely demand ischemia in the setting of ESRD        Hypokalemia  Stable     Enteritis  Resolved     Type 2 diabetes mellitus with kidney complication, with long-term current use of insulin  Maintain present regime       ESRD (end stage renal disease)  HD sessions as scheduled         VTE Risk Mitigation (From admission, onward)         Ordered     heparin (porcine) injection 5,000 Units  As needed (PRN)         05/10/22 1541     IP VTE HIGH RISK PATIENT  Once         05/05/22 0003     Place sequential compression device  Until discontinued         05/05/22 0003                Discharge Planning   ERI: 5/20/2022     Code Status: Full Code   Is the patient medically ready for discharge?: No    Reason for patient still in hospital (select all that apply): Treatment  Discharge Plan A: Skilled Nursing Facility   Discharge Delays: (!) Change in Medical Condition              Usman Stacy MD  Department of Hospital Medicine   [facility]

## 2022-05-18 NOTE — PROGRESS NOTES
HD tx complete, net UF 2L removed       05/18/22 1230   Required for all Hemodialysis Patients   Hepatitis Status negative   Handoff Report   Received From [facility]   Given To Mary   Treatment Type   Treatment Type Maintenance   Vital Signs   Pulse 72   /67   Assessments (Pre/Post)   Consent Obtained yes   Safety vein preservation armband present   Date Hepatitis Profile Obtained 03/10/22   Blood Liters Processed (BLP) 63   Transport Modality bed   Level of Consciousness (AVPU) alert   Dialyzer Clearance moderately streaked   Pre-Hemodialysis Assessment   Treatment Status Completed        Hemodialysis AV Fistula Left upper arm   No Placement Date or Time found.   Present Prior to Hospital Arrival?: Yes  Location: Left upper arm   Needle Size 15ga   Site Assessment No redness;No swelling   Patency Present;Thrill;Bruit   Status Deaccessed   Flows Good   Dressing Intervention First dressing   Dressing Status Clean;Dry;Intact   Site Condition No complications   Dressing Gauze   Post-Hemodialysis Assessment   Rinseback Volume (mL) 250 mL   Blood Volume Processed (Liters) 63 L   Dialyzer Clearance Moderately streaked   Duration of Treatment 180 minutes   Additional Fluid Intake (mL) 500 mL   Total UF (mL) 2500 mL   Net Fluid Removal 2000   Patient Response to Treatment Tolerated well   Post-Treatment Weight 89.8 kg (197 lb 15.6 oz)   Treatment Weight Change -2   Arterial bleeding stop time (min) 5 min   Venous bleeding stop time (min) 5 min   Post-Hemodialysis Comments Tx completed per protocol, all blood returned without incident   Edema   Edema generalized

## 2022-05-18 NOTE — SUBJECTIVE & OBJECTIVE
Interval History:     Review of Systems   Constitutional:  Negative for activity change and appetite change.   HENT:  Negative for congestion and dental problem.    Eyes:  Negative for discharge and itching.   Respiratory:  Negative for shortness of breath.    Cardiovascular:  Negative for chest pain.   Gastrointestinal:  Negative for abdominal distention and abdominal pain.   Endocrine: Negative for cold intolerance.   Genitourinary:  Negative for difficulty urinating and dysuria.   Musculoskeletal:  Positive for arthralgias. Negative for back pain.   Skin:  Positive for wound. Negative for color change.   Neurological:  Negative for dizziness and facial asymmetry.   Hematological:  Negative for adenopathy.   Psychiatric/Behavioral:  Negative for agitation and behavioral problems.    Objective:     Vital Signs (Most Recent):  Temp: 98.1 °F (36.7 °C) (05/17/22 1625)  Pulse: 72 (05/17/22 1625)  Resp: 18 (05/17/22 1625)  BP: (!) 160/76 (05/17/22 1625)  SpO2: 97 % (05/17/22 1625)   Vital Signs (24h Range):  Temp:  [97.7 °F (36.5 °C)-99.6 °F (37.6 °C)] 98.1 °F (36.7 °C)  Pulse:  [67-72] 72  Resp:  [18] 18  SpO2:  [95 %-98 %] 97 %  BP: (130-176)/(62-80) 160/76     Weight: 91.8 kg (202 lb 6.1 oz)  Body mass index is 31.7 kg/m².    Intake/Output Summary (Last 24 hours) at 5/17/2022 2000  Last data filed at 5/17/2022 1200  Gross per 24 hour   Intake 960 ml   Output --   Net 960 ml      Physical Exam  Vitals and nursing note reviewed.   Constitutional:       General: She is not in acute distress.  HENT:      Head: Atraumatic.      Right Ear: External ear normal.      Left Ear: External ear normal.      Nose: Nose normal.      Mouth/Throat:      Mouth: Mucous membranes are moist.   Eyes:      General: No scleral icterus.  Cardiovascular:      Rate and Rhythm: Normal rate.   Pulmonary:      Effort: Pulmonary effort is normal.   Abdominal:      General: There is no distension.   Musculoskeletal:         General: Normal range of  motion.      Cervical back: Normal range of motion.   Skin:     Comments: L foot bandage intact   Neurological:      Mental Status: She is alert and oriented to person, place, and time.   Psychiatric:         Behavior: Behavior normal.       Significant Labs: All pertinent labs within the past 24 hours have been reviewed.  CBC:   Recent Labs   Lab 05/16/22  0945 05/17/22  0609   WBC 17.78* 15.86*   HGB 9.9* 9.4*   HCT 31.9* 30.2*   * 435     CMP:   Recent Labs   Lab 05/16/22  0945 05/17/22  0609   * 133*   K 4.8 4.6   CL 90* 99   CO2 19* 21*   * 113*   * 71*   CREATININE 10.3* 6.9*   CALCIUM 9.4 9.1   ANIONGAP 20* 13   EGFRNONAA 3.8* 6.1*       Significant Imaging: I have reviewed all pertinent imaging results/findings within the past 24 hours.

## 2022-05-18 NOTE — ASSESSMENT & PLAN NOTE
Gram positive rods on BC X 1 :Bacillus cereus   Had enteritis/Food poisoning which has been resolved(ate chinese food before onset of symptoms)  Repeat Blood cultures so far normal  S/p Incision/drainage/debridement of  Left foot deep tissue abscess below fascia muscle and tendon  Again had Incision and drainage below fascia of multiple abscesses left foot on 05/13  Continue Daptomycin 500 mg IV q48h for 5 weeks  Also pt on iv zosyn    MRI done on 05/17 showed large soft tissue ulceration involving the medial plantar soft tissues has increased in size, and new smaller ulcerations have developed anteriorly and posteriorly.   Also Irregular fluid collections involving medial great toe plantar soft tissues/intrinsic foot musculature, which could represent developing abscesses.

## 2022-05-18 NOTE — ASSESSMENT & PLAN NOTE
Wound care  MRI r/o osteomyelitis  WBC scan showed medial left foot as source of infection  S/p Incision/drainage/debridement of  Left foot deep tissue abscess below fascia muscle and tendon  Again had Incision and drainage below fascia of multiple abscesses left foot on 05/13  MRI done on 05/17 showed large soft tissue ulceration involving the medial plantar soft tissues has increased in size, and new smaller ulcerations have developed anteriorly and posteriorly.   Also Irregular fluid collections involving medial great toe plantar soft tissues/intrinsic foot musculature, which could represent developing abscesses.

## 2022-05-18 NOTE — PT/OT/SLP PROGRESS
Physical Therapy      Patient Name:  Leanna García   MRN:  [id number]    Patient not seen today secondary to Dialysis. Will follow-up 05/19/2022.

## 2022-05-18 NOTE — ASSESSMENT & PLAN NOTE
[facility]AC stay denied by insurance  Pt already had L foot abscess debridement x 2   May need further foot abscess exploration again before she goes to SNF

## 2022-05-18 NOTE — PROGRESS NOTES
NMConsult Note  Infectious Disease    Reason for Consult:  Bacillus cereus bacteremia, and MRSA osteomyelitis     HPI: Leanna García is a 56 y.o. female known to me from prior consultation in September of 2018, was admitted through the emergency room yesterday with fever 103,   Vomiting of 1 day's duration, and chronic sinus congestion, postnasal drip, cough at least several weeks duration and a foot ulcer on the plantar/medial surface surface of the left foot of probably several months duration.  The vomiting began within a few hours of a meal at a [employer] restaurant.  She did not have any diarrhea In the emergency room she had a CT scan of the abdomen which suggested possible enteritis and bilateral perinephric fat stranding, as well as cholelithiasis, and extensive vascular calcifications.  her blood sugar was 446, white blood cells 21,000, normal lactic acid.  Photographs taken in the emergency room suggest cellulitis of the medial and plantar foot She was started on Zosyn and doxycycline as she has history of vancomycin allergy. .  She was seen by Podiatry , and MRI did not demonstrate any abscess or drainage and wound care was ordered.  She was noted this morning to have difficulty swallowing and was seen by speech therapy.  Today 1 anaerobic bottle has a Gram-positive mariana prompting this consult.    5/6: interim reviewed. Still febrile through last night. Blood culture does have characteristics of Bacillus cereus. This will be sent out to [facility] for MALDI ID. Respiratory pcr panel was negative. She was able to eat solid food this am. She denies abdominal pain. Podiatry debrided her foot this am and submitted cultures and is ordering a WBC Scan. Coughing less. No wheezes.     5/7 (Daren):  Interim reviewed, discussed with Dr Bailon.  Patient seen and examined at bedside, states she does not feel good today, feels like she got something that is making her feel off, not her usual self.  Labile BP  121/58, low-grade fever T-max 102° yesterday, currently 100.8.  Wound cultures from left foot grew Staph aureus, pending sensitivities.  She is currently on Daptomycin, Zosyn and levofloxacin IV.    5/8: Interim reviewed, patient seen and examined at bedside. Feeling significantly better compared to yesterday.  Having lunch at bedside.  Hemodynamically stable, T-max 103° yesterday, currently afebrile.  Labs reviewed, white count 17, PMN 79.2%, no bands.  H&H 7/23.3, platelet count 332. Sodium 135, potassium 4.4.  Status post further debridement at bedside today by Podiatry, large deep tissue abscess seen on the plantar facial below the flexor tendon.  Mild necrosis noted and all nonviable tissue was debrided, cultures in process.  Micro updated, confirmed Bacillus cereus bacteremia, wound culture from left foot grew MRSA.    5/9 (Adamaris):  Interim reviewed.  Blood culture isolate confirmed to be bacillus serious, likely from the restaurant that she ate before the onset of her illness.  Wound cultures from 05/16//8 have MRSA.  As she is allergic to vancomycin she is on daptomycin.  Echocardiogram negative temperature is improved since the drainage of her foot though she did have a 101 temperature last night.  White blood cells remain elevated, hemoglobin 6.7, she is receiving blood, CRP is down about 10%. Not getting out of bed. Sugary soft drinks on her bedside table.   5/10: interim reviewed. Fever has finally resolved. Received blood with dialysis yesterday. Foot cultures have grown MSSA and MRSA. Currently struggling with constipation, miserable, on bedpan.   5/11: low grade temp again today. Per RN, wound care did note some purulence yesterday in foot wound at the time of wound vac application yesterday. Diarrhea? after laxatives. WBC still very high, midline in place now.   5/12: still has fever, and leukocytosis, plans for debridement in OR noted. KUB normal.  She currently has no complaints, is eating well,  no diarrhea, much improved cough and congestion compared to admission.  No IV site phlebitis, no rashes, no thrush, no inflammation associated with her left arm AV fistula, no dysuria.  5/14:  Interim reviewed fever seems to have resolved.  Operative note from yesterday reviewed.  Cultures taken in the operating room yesterday are no growth so far.  Plans for discharge to skilled nursing at [facility] noted.  Having some pain from surgical site today.  She has a warm compress on it which gives her some relief.  5/17: interim reviewed. Wound care photos reviewed and there are worrisome changes in the appearance of the plantar surface.  Wound vac is now in place again with generous amount of gentian violet. Her foot is warm, not more painful. There is morebruising and there was increased maceration on plantar surface. Her CRP is falling slowly, as is her WBC count. MRI has been ordered by podiatry.   5/18:  MRI reviewed.  Examination of foot per Podiatry and Wound Care is much improved, contributing most of the changes yesterday to maceration and moisture, not worsening infection.  She complains of abdominal bloating and constipation, requesting lactulose.    EXAM & DIAGNOSTICS REVIEWED:     Vitals:     Temp:  [97.3 °F (36.3 °C)-99 °F (37.2 °C)]   Temp: 98.6 °F (37 °C) (05/18/22 1711)  Pulse: 71 (05/18/22 1711)  Resp: 18 (05/18/22 1711)  BP: 137/67 (05/18/22 1711)  SpO2: 96 % (05/18/22 1711)    Intake/Output Summary (Last 24 hours) at 5/18/2022 1726  Last data filed at 5/18/2022 1230  Gross per 24 hour   Intake 740 ml   Output 2500 ml   Net -1760 ml        General:  In NAD. Alert and attentive, cooperative, comfortable on room air, completely nontoxic  Eyes:  Anicteric,  EOMI  ENT:  No ulcers, exudates, thrush, nares patent, edentulous  Neck:  Supple,   Lungs:    Heart:      Abd:     :  Voids   Musc:  Excluding left foot, Joints without effusion, swelling, erythema, synovitis but she does have lower  extremity muscle wasting.  She has Charcot arthropathy with fallen arches bilaterally    Skin:  No rashes   Neuro: Alert, attentive, speech fluent, face symmetric, moves all extremities, no focal weakness.  Minimally Ambulatory at baseline  Psych:  Calm, cooperative  Lymphatic:        Extrem: Left foot with wound vac in place     No edema, erythema, phlebitis, warm and well perfused  VAD:  Left arm AV fistula  No redness , right arm midline    Isolation: contact - MRSA  Wound:           5/6:   This was debrided full thickness this am    5/8:        5/9: bandage changed, packing not disturbed  5/11: wound vac in place  5/14:  Wound VAC in place, no new wound photos    5/17:       Wound vac is now in place again with generous amount of gentian violet. Her foot is warm, not more painful. There is morebruising and there was increased maceration on plantar surface. There is no crepitance, no bullae formation, and there is now a DTI of the heel. Which is tender.     5/18:    Appearance much improved after maceration was treated with drying agents, gentian violet                      General Labs reviewed:  Recent Labs   Lab 05/16/22  0945 05/17/22  0609 05/18/22  0445   WBC 17.78* 15.86* 13.99*   HGB 9.9* 9.4* 9.2*   HCT 31.9* 30.2* 30.2*   * 435 425       Recent Labs   Lab 05/16/22  0945 05/17/22  0609 05/18/22  0445   * 133* 135*   K 4.8 4.6 5.3*   CL 90* 99 97   CO2 19* 21* 22*   * 71* 89*   CREATININE 10.3* 6.9* 8.7*   CALCIUM 9.4 9.1 9.6     Recent Labs   Lab 05/16/22  0945 05/17/22  0609 05/18/22  0445   CRP 15.91* 14.32* 13.28*         Micro:  Microbiology Results (last 7 days)     Procedure Component Value Units Date/Time    Culture, Anaerobic [[id number]] Collected: 05/13/22 0800    Order Status: Completed Specimen: Wound from Foot, Left Updated: 05/16/22 0715     Anaerobic Culture No anaerobes isolated    AFB Culture & Smear [[id number]] Collected: 05/13/22 0800    Order Status: Completed  Specimen: Wound from Foot, Left Updated: 05/15/22 0848     AFB CULTURE STAIN No acid fast bacilli seen.     AFB CULTURE STAIN Testing performed by:     AFB CULTURE STAIN Lab [facility]     [facility] [address] [facility]     [place] [address]     AFB CULTURE STAIN Dr.Brian John MD    Gram stain [[id number]] Collected: 05/13/22 0800    Order Status: Completed Specimen: Wound from Foot, Left Updated: 05/15/22 0820     Gram Stain Result Rare WBC's      No organisms seen    Aerobic culture [[id number]] Collected: 05/13/22 0800    Order Status: Completed Specimen: Wound from Foot, Left Updated: 05/15/22 0743     Aerobic Bacterial Culture Skin kunal,  no predominant organism    Fungus culture [[id number]] Collected: 05/13/22 0800    Order Status: Sent Specimen: Wound from Foot, Left Updated: 05/13/22 1048    Blood culture [[id number]] Collected: 05/08/22 0723    Order Status: Completed Specimen: Blood Updated: 05/13/22 1032     Blood Culture, Routine No growth after 5 days.    Narrative:      Collection has been rescheduled by SLR at 05/08/2022 06:15 Reason:   Unable to collect  Collection has been rescheduled by RE1 at 05/08/2022 06:55 Reason:   Unable to collect   Collection has been rescheduled by SLR at 05/08/2022 06:15 Reason:   Unable to collect  Collection has been rescheduled by RE1 at 05/08/2022 06:55 Reason:   Unable to collect           Imaging Reviewed:   CXR   CT abdomen and pelvis 5/4   1.  Small to moderate amount of fluid in the small intestines with scattered air fluid levels, possibly enteritis 2.  Moderate bilateral perinephric stranding. Correlate with urinalysis to exclude the possibility of pyelonephritis 3.  Gallbladder distention with gallstones     MRI of the left midfoot 5/5  IMPRESSION: 16mm skin ulceration in the medial plantar soft tissues with associated cellulitis. There is no abscess or osteomyelitis   Moderate degenerative changes of the midfoot    NM  scan Abnormal indium-111 WBC uptake along medial left foot suggesting source of infection.     KUB 5/12 WNL    Cardiology:   ECHO 5/6/22  The left ventricle is normal in size with normal systolic function.  The estimated ejection fraction is 65%.  Normal left ventricular diastolic function.  Normal right ventricular size with normal right ventricular systolic function.  Mild mitral regurgitation.  All valves visualized, normal.    IMPRESSION & PLAN     1. Bacillus cereus bacteremia likely in the setting of recent Chinese food ingestion   Blood cultures 5/6 & 5/8 no growth, pending final   Echocardiogram negative   [facility] department notified    2. Left foot ulcer, deep tissue abscess/osteomyelitis status post debridement by Podiatry 5/6 and 5/8, deep pocket of pus drained 5/9, debridement in OR 5/13   Wound culture 5/6 MRSA and MSSA    3.  ESRD on HD,  Nephrology following    4.  Diabetes with hyperglycemia, A1c 14%  5. constipation     Recommendations:  Stopped Zosyn this morning    Continue Daptomycin 500 mg IV q48h open (or after dialysis on dialysis days) for MRSA, MSSA and Bacillus cereus for another 5 weeks     Weekly cpk, hold statins while on daptomycin     No opposition to transfer to skilled nursing now     Needs aggressive glycemic control  D/w  Patient  , wound care, hospital medicine and podiatry        Medical Decision Making during this encounter was  [_] Low Complexity  [_] Moderate Complexity  [x  ] High Complexity

## 2022-05-18 NOTE — PT/OT/SLP PROGRESS
Occupational Therapy   Treatment    Name: Leanna García  MRN: [id number]  Admitting Diagnosis:  Bacteremia  5 Days Post-Op    Recommendations:     Discharge Recommendations: nursing facility, skilled  Discharge Equipment Recommendations:   (TBD)  Barriers to discharge:       Assessment:     Leanna García is a 56 y.o. female with a medical diagnosis of Bacteremia.  Pt agreeable to OT therapy session this PM. Performance deficits affecting function are weakness, impaired endurance, impaired self care skills, impaired functional mobilty, gait instability, impaired balance, decreased lower extremity function, decreased safety awareness, pain, impaired skin, impaired cardiopulmonary response to activity, orthopedic precautions. Pt just getting lunch upon entry.    Rehab Prognosis:  Good; patient would benefit from acute skilled OT services to address these deficits and reach maximum level of function.       Plan:     Patient to be seen 5 x/week to address the above listed problems via self-care/home management, therapeutic activities, therapeutic exercises  · Plan of Care Expires: 06/08/22  · Plan of Care Reviewed with: patient    Subjective     Pain/Comfort:  · Pain Rating 1: 0/10    Objective:     Communicated with: nursing prior to session.  Patient found HOB elevated with telemetry, peripheral IV, bed alarm, wound vac upon OT entry to room.    General Precautions: Standard, fall, contact   Orthopedic Precautions:LLE non weight bearing   Braces: N/A  Respiratory Status: Room air     Occupational Performance:     Bed Mobility:    · Pt able to scoot self up in bed using UE's and RLE, with cues needed from OT to not push up with LLE due to NWB status     Activities of Daily Living:  · Feeding:  after lunch tray properly positioned infront of patient, patient able to feed self independently and open all containers independently       Treatment & Education:  Pt educated on role of OT/POC, importance of OOB/EOB  activity, use of call bell, and safety during ADLs, transfers, and functional mobility.     Patient left HOB elevated with all lines intact, call button in reach, bed alarm on and  present at end of session     GOALS:   Multidisciplinary Problems     Occupational Therapy Goals        Problem: Occupational Therapy    Goal Priority Disciplines Outcome Interventions   Occupational Therapy Goal     OT, PT/OT Ongoing, Progressing    Description: Goals to be met by: discharge     Patient will increase functional independence with ADLs by performing:    UE Dressing with Supervision.  LE Dressing with Supervision.  Grooming while seated with Supervision.  Toileting from toilet with Supervision for hygiene and clothing management.   Supine to sit with Supervision.  Toilet transfer to toilet with Supervision.  Perform sitting/standing ADL activity while maintaining LLE NWB with no verbal cues.                     Time Tracking:     OT Date of Treatment: 05/18/22  OT Start Time: 1306  OT Stop Time: 1315  OT Total Time (min): 9 min    Billable Minutes:Self Care/Home Management 9    OT/HARVEY: OT          5/18/2022

## 2022-05-18 NOTE — PROGRESS NOTES
[facility]  Podiatry  Progress Note    Patient Name: Leanna García  MRN: [id number]  Admission Date: 5/4/2022  Hospital Length of Stay: 14 days  Attending Physician: Usman Stacy MD  Primary Care Provider: Stefano Lopez MD     Subjective:     Interval History:  Patient seen at bedside with her  present.  States she she is still having occasional moderate pain to the left foot.    Follow-up For: Procedure(s) (LRB):  INCISION AND DRAINAGE, FOOT (Left)    Post-Operative Day: 5 Days Post-Op    Scheduled Meds:   amLODIPine  10 mg Oral Daily    ascorbic acid (vitamin C)  500 mg Oral Daily    aspirin  81 mg Oral Daily    azelastine  1 spray Nasal BID    calcium acetate(phosphat bind)  667 mg Oral TID WM    cetirizine  10 mg Oral Every other day    collagenase   Topical (Top) Daily    DAPTOmycin (CUBICIN)  IV  500 mg Intravenous Q48H    epoetin chris-epbx  100 Units/kg Intravenous Every Mon, Wed, Fri    fluticasone propionate  2 spray Each Nostril Daily    insulin detemir U-100  20 Units Subcutaneous QHS    losartan  100 mg Oral Daily    multivitamin  1 tablet Oral Daily     Continuous Infusions:  PRN Meds:sodium chloride, sodium chloride 0.9%, sodium chloride 0.9%, acetaminophen, ALPRAZolam, benzonatate, dextrose 50%, dextrose 50%, heparin (porcine), hydrALAZINE, HYDROcodone-acetaminophen, insulin aspart U-100, naloxone, ondansetron, ondansetron, polyethylene glycol, promethazine, sodium chloride 0.9%, sodium chloride 0.9%, sodium chloride 0.9%, sodium chloride 0.9%    Review of Systems   Constitutional: Negative for chills, fatigue, fever and unexpected weight change.   HENT: Negative for hearing loss and trouble swallowing.    Eyes: Negative for photophobia and visual disturbance.   Respiratory: Negative for cough, shortness of breath and wheezing.    Cardiovascular: Negative for chest pain, palpitations and leg swelling.   Gastrointestinal: Negative for abdominal pain and nausea.    Genitourinary: Negative for dysuria and frequency.   Musculoskeletal: Positive for arthralgias, gait problem and joint swelling. Negative for back pain and myalgias.   Skin: Positive for color change and wound. Negative for rash.   Neurological: Positive for weakness and numbness. Negative for tremors, seizures and headaches.   Hematological: Does not bruise/bleed easily.     Objective:     Vital Signs (Most Recent):  Temp: 97.3 °F (36.3 °C) (05/18/22 1130)  Pulse: 72 (05/18/22 1230)  Resp: 18 (05/18/22 1043)  BP: 138/67 (05/18/22 1230)  SpO2: 97 % (05/18/22 0910) Vital Signs (24h Range):  Temp:  [97.3 °F (36.3 °C)-99 °F (37.2 °C)] 97.3 °F (36.3 °C)  Pulse:  [61-76] 72  Resp:  [18-19] 18  SpO2:  [95 %-98 %] 97 %  BP: (111-214)/(49-85) 138/67     Weight: 91.8 kg (202 lb 6.1 oz)  Body mass index is 31.7 kg/m².    Foot Exam    Laboratory:  A1C:   Recent Labs   Lab 03/10/22  0650 05/05/22  0320   HGBA1C >14.0* 10.6*     CBC:   Recent Labs   Lab 05/18/22  0445   WBC 13.99*   RBC 3.33*   HGB 9.2*   HCT 30.2*      MCV 91   MCH 27.6   MCHC 30.5*     CMP:   Recent Labs   Lab 05/18/22  0445   *   CALCIUM 9.6   *   K 5.3*   CO2 22*   CL 97   BUN 89*   CREATININE 8.7*     CRP:   Recent Labs   Lab 05/18/22  0445   CRP 13.28*     ESR: No results for input(s): SEDRATE in the last 168 hours.  Wound Cultures:   Recent Labs   Lab 05/06/22  0753 05/13/22  0800   LABAERO METHICILLIN RESISTANT STAPHYLOCOCCUS AUREUS  Many  Results called to and read back by Kathy Maki RN-BCARD;    05/08/2022  08:32 CJD  * Skin kunal,  no predominant organism       Diagnostic Results:  I have reviewed all pertinent imaging results/findings within the past 24 hours.    Clinical Findings:                  Patient's left foot appears significantly improved compared to yesterday morning.  I am unable to express any drainage from any of her wounds.  There is no excess edema in the central lateral portions of the foot.  No  fluctuance or crepitus is present.    Assessment/Plan:     Active Diagnoses:    Diagnosis Date Noted POA    PRINCIPAL PROBLEM:  Bacteremia [R78.81] 05/05/2022 Unknown    Discharge planning issues [Z02.9] 05/11/2022 Not Applicable    Abscess of left foot [L02.612] 05/08/2022 Yes    Chronic anemia [D64.9] 05/08/2022 Unknown    Foot infection [L08.9]  Yes    Diabetic foot infection [E11.628, L08.9]  Yes    Sepsis [A41.9]  Yes    Diabetic ulcer of left midfoot [E11.621, L97.429] 05/05/2022 Yes    Enteritis [K52.9] 05/05/2022 Yes    Hypokalemia [E87.6] 05/05/2022 Yes    Elevated troponin [R77.8] 05/05/2022 Yes    Acute bronchitis [J20.9]  Unknown    Cough [R05.9]  Unknown    Type 2 diabetes mellitus with kidney complication, with long-term current use of insulin [E11.29, Z79.4] 06/25/2020 Not Applicable    ESRD (end stage renal disease) [N18.6] 05/18/2018 Yes      Problems Resolved During this Admission:    Diagnosis Date Noted Date Resolved POA    Type 2 diabetes mellitus with hyperglycemia, without long-term current use of insulin [E11.65] 06/25/2020 05/05/2022 Yes       I discussed the patient and her  that it appears the significantly worsened appearance of the foot yesterday was due to excess maceration with the wound VAC.  now that the area has been allowed to dry with dry dressing and gentian violet it appears significantly improved.  I am unable to express any drainage.  Plan will be to reapply the wound VAC today at which time patient will be okay for discharge to SNF from a podiatry standpoint.  I did discuss with the patient and her  that long-term prognosis for the foot is still guarded as she still has significant wounds and multiple comorbidities which will make healing slow and difficult.    Also discussed with hospitalist and Infectious Disease.    Ricardo Bruno DPM  Podiatry  [facility]

## 2022-05-18 NOTE — PROGRESS NOTES
[facility] Medicine  Progress Note    Patient Name: Leanna García  MRN: [id number]  Patient Class: IP- Inpatient   Admission Date: 5/4/2022  Length of Stay: 13 days  Attending Physician: Usman Stacy MD  Primary Care Provider: Stefano Lopez MD        Subjective:     Principal Problem:Bacteremia        HPI:  Ms. García is a 56-year-old female with a past medical history of ESRD on HD Monday Wednesday Friday, hypertension, IDDM2, PAF, and chronic cough who presents today with complaints of fever up to 103. It is severe.  It is associated with cough, posttussive vomiting, fatigue, sinus congestion, weakness, and a left foot ulcer.  She denies chest pain, diarrhea, dizziness, loss of consciousness.  In the ED she was noted to have a large left foot ulceration at the plantar surface and on the side of the foot.  CT of abdomen pelvis also reveals possible enteritis and bilateral perinephric fat stranding.  WBCs 21 K, troponin is 0.4 in the setting of ESRD.  She did not go to dialysis today she felt too bad.  Glucose is 446, anion gap 20, bicarb 24.       Overview/Hospital Course:  05/05  Had HD today  MRI r/o osteomyelitis  Afebrile now    05/06  Had febrile episodes overnight  Blood cultures [place] [facility] [place] [facility] [place]  Had [place] today  Awaiting WBC scan     05/07  WBC scan showed medial left foot infection  C/o extreme fatigue/weakness    05/08  Pt having on and off febrile episodes at night  Pt today had bedside debridement of L foot abscess     05/09  Pt had HD today  Febrile episodes eprsists  Receive done unit of blood     05/10  Overall much better except for constipation  Awaiting insurance approval for LTAC placement   Otherwise stable     05/11  LTAC stay denied by insurance  Insurance approved SNF placement  Medically stable     05/12  Pt still having febrile episodes  WBC levels high  Pt c/o pain on R foot area    05/13  Pt today had Incision and drainage below fascia of multiple  abscesses left foot  Postoperatively drowsy   Had HD today     05/14  No new issues  Awaiting SNF placement     05/15  Awaiting SNF placement  Medically stable    05/16  Awaiting SNF placement  Medically stable    05/17  MRI done on 05/17 showed large soft tissue ulceration involving the medial plantar soft tissues has increased in size, and new smaller ulcerations have developed anteriorly and posteriorly.   Also Irregular fluid collections involving medial great toe plantar soft tissues/intrinsic foot musculature, which could represent developing abscesses.       Interval History:     Review of Systems   Constitutional:  Negative for activity change and appetite change.   HENT:  Negative for congestion and dental problem.    Eyes:  Negative for discharge and itching.   Respiratory:  Negative for shortness of breath.    Cardiovascular:  Negative for chest pain.   Gastrointestinal:  Negative for abdominal distention and abdominal pain.   Endocrine: Negative for cold intolerance.   Genitourinary:  Negative for difficulty urinating and dysuria.   Musculoskeletal:  Positive for arthralgias. Negative for back pain.   Skin:  Positive for wound. Negative for color change.   Neurological:  Negative for dizziness and facial asymmetry.   Hematological:  Negative for adenopathy.   Psychiatric/Behavioral:  Negative for agitation and behavioral problems.    Objective:     Vital Signs (Most Recent):  Temp: 98.1 °F (36.7 °C) (05/17/22 1625)  Pulse: 72 (05/17/22 1625)  Resp: 18 (05/17/22 1625)  BP: (!) 160/76 (05/17/22 1625)  SpO2: 97 % (05/17/22 1625)   Vital Signs (24h Range):  Temp:  [97.7 °F (36.5 °C)-99.6 °F (37.6 °C)] 98.1 °F (36.7 °C)  Pulse:  [67-72] 72  Resp:  [18] 18  SpO2:  [95 %-98 %] 97 %  BP: (130-176)/(62-80) 160/76     Weight: 91.8 kg (202 lb 6.1 oz)  Body mass index is 31.7 kg/m².    Intake/Output Summary (Last 24 hours) at 5/17/2022 2000  Last data filed at 5/17/2022 1200  Gross per 24 hour   Intake 960 ml    Output --   Net 960 ml      Physical Exam  Vitals and nursing note reviewed.   Constitutional:       General: She is not in acute distress.  HENT:      Head: Atraumatic.      Right Ear: External ear normal.      Left Ear: External ear normal.      Nose: Nose normal.      Mouth/Throat:      Mouth: Mucous membranes are moist.   Eyes:      General: No scleral icterus.  Cardiovascular:      Rate and Rhythm: Normal rate.   Pulmonary:      Effort: Pulmonary effort is normal.   Abdominal:      General: There is no distension.   Musculoskeletal:         General: Normal range of motion.      Cervical back: Normal range of motion.   Skin:     Comments: L foot bandage intact   Neurological:      Mental Status: She is alert and oriented to person, place, and time.   Psychiatric:         Behavior: Behavior normal.       Significant Labs: All pertinent labs within the past 24 hours have been reviewed.  CBC:   Recent Labs   Lab 05/16/22  0945 05/17/22  0609   WBC 17.78* 15.86*   HGB 9.9* 9.4*   HCT 31.9* 30.2*   * 435     CMP:   Recent Labs   Lab 05/16/22  0945 05/17/22  0609   * 133*   K 4.8 4.6   CL 90* 99   CO2 19* 21*   * 113*   * 71*   CREATININE 10.3* 6.9*   CALCIUM 9.4 9.1   ANIONGAP 20* 13   EGFRNONAA 3.8* 6.1*       Significant Imaging: I have reviewed all pertinent imaging results/findings within the past 24 hours.      Assessment/Plan:      * Bacteremia  Gram positive rods on BC X 1 :Bacillus cereus   Had enteritis/Food poisoning which has been resolved(ate chinese food before onset of symptoms)  Repeat Blood cultures so far normal  S/p Incision/drainage/debridement of  Left foot deep tissue abscess below fascia muscle and tendon  Again had Incision and drainage below fascia of multiple abscesses left foot on 05/13  Continue Daptomycin 500 mg IV q48h for 5 weeks  Also pt on iv zosyn    MRI done on 05/17 showed large soft tissue ulceration involving the medial plantar soft tissues has  increased in size, and new smaller ulcerations have developed anteriorly and posteriorly.   Also Irregular fluid collections involving medial great toe plantar soft tissues/intrinsic foot musculature, which could represent developing abscesses.     Sepsis  Bacillus cereus bacteremia along with L foot abscess       Abscess of left foot  S/p Incision/drainage/debridement of  Left foot deep tissue abscess below fascia muscle and tendon  Again had Incision and drainage below fascia of multiple abscesses left foot on 05/13  MRI done on 05/17 showed large soft tissue ulceration involving the medial plantar soft tissues has increased in size, and new smaller ulcerations have developed anteriorly and posteriorly.   Also Irregular fluid collections involving medial great toe plantar soft tissues/intrinsic foot musculature, which could represent developing abscesses.     Diabetic ulcer of left midfoot  Wound care  MRI r/o osteomyelitis  WBC scan showed medial left foot as source of infection  S/p Incision/drainage/debridement of  Left foot deep tissue abscess below fascia muscle and tendon  Again had Incision and drainage below fascia of multiple abscesses left foot on 05/13  MRI done on 05/17 showed large soft tissue ulceration involving the medial plantar soft tissues has increased in size, and new smaller ulcerations have developed anteriorly and posteriorly.   Also Irregular fluid collections involving medial great toe plantar soft tissues/intrinsic foot musculature, which could represent developing abscesses.     Discharge planning issues  LTAC stay denied by insurance  Pt already had L foot abscess debridement x 2   May need further foot abscess exploration again before she goes to SNF       Chronic anemia  pRBC if needed along with Dialysis sessions  Received one unit of blood on 05/09      Diabetic foot infection  As above         Foot infection  As above       Cough  Symptomatic management      Acute  bronchitis  Symptomatic management      Elevated troponin  Likely demand ischemia in the setting of ESRD        Hypokalemia  Stable     Enteritis  Resolved     Type 2 diabetes mellitus with kidney complication, with long-term current use of insulin  Maintain present regime       ESRD (end stage renal disease)  HD sessions as scheduled         VTE Risk Mitigation (From admission, onward)         Ordered     heparin (porcine) injection 5,000 Units  As needed (PRN)         05/10/22 1541     IP VTE HIGH RISK PATIENT  Once         05/05/22 0003     Place sequential compression device  Until discontinued         05/05/22 0003                Discharge Planning   ERI: 5/20/2022     Code Status: Full Code   Is the patient medically ready for discharge?: No    Reason for patient still in hospital (select all that apply): Treatment and Pending disposition  Discharge Plan A: Skilled Nursing Facility   Discharge Delays: (!) Change in Medical Condition              Usman Stacy MD  Department of Hospital Medicine   Counts include 234 beds at the [facility]

## 2022-05-19 VITALS
WEIGHT: 202.38 LBS | RESPIRATION RATE: 18 BRPM | DIASTOLIC BLOOD PRESSURE: 85 MMHG | SYSTOLIC BLOOD PRESSURE: 116 MMHG | BODY MASS INDEX: 31.76 KG/M2 | HEART RATE: 76 BPM | HEIGHT: 67 IN | TEMPERATURE: 98 F | OXYGEN SATURATION: 97 %

## 2022-05-19 PROBLEM — Z02.9 DISCHARGE PLANNING ISSUES: Status: RESOLVED | Noted: 2022-05-11 | Resolved: 2022-05-19

## 2022-05-19 PROBLEM — L02.612 ABSCESS OF LEFT FOOT: Status: RESOLVED | Noted: 2022-05-08 | Resolved: 2022-05-19

## 2022-05-19 PROBLEM — Z75.8 DISCHARGE PLANNING ISSUES: Status: RESOLVED | Noted: 2022-05-11 | Resolved: 2022-05-19

## 2022-05-19 PROBLEM — L97.429 DIABETIC ULCER OF LEFT MIDFOOT: Status: RESOLVED | Noted: 2022-05-05 | Resolved: 2022-05-19

## 2022-05-19 PROBLEM — E11.621 DIABETIC ULCER OF LEFT MIDFOOT: Status: RESOLVED | Noted: 2022-05-05 | Resolved: 2022-05-19

## 2022-05-19 PROBLEM — E11.29 TYPE 2 DIABETES MELLITUS WITH KIDNEY COMPLICATION, WITH LONG-TERM CURRENT USE OF INSULIN: Status: RESOLVED | Noted: 2020-06-25 | Resolved: 2022-05-19

## 2022-05-19 PROBLEM — R78.81 BACTEREMIA: Status: RESOLVED | Noted: 2022-05-05 | Resolved: 2022-05-19

## 2022-05-19 PROBLEM — E87.6 HYPOKALEMIA: Status: RESOLVED | Noted: 2022-05-05 | Resolved: 2022-05-19

## 2022-05-19 PROBLEM — R79.89 ELEVATED TROPONIN: Status: RESOLVED | Noted: 2022-05-05 | Resolved: 2022-05-19

## 2022-05-19 PROBLEM — K52.9 ENTERITIS: Status: RESOLVED | Noted: 2022-05-05 | Resolved: 2022-05-19

## 2022-05-19 PROBLEM — Z79.4 TYPE 2 DIABETES MELLITUS WITH KIDNEY COMPLICATION, WITH LONG-TERM CURRENT USE OF INSULIN: Status: RESOLVED | Noted: 2020-06-25 | Resolved: 2022-05-19

## 2022-05-19 LAB
ANION GAP SERPL CALC-SCNC: 15 MMOL/L (ref 8–16)
BASOPHILS # BLD AUTO: 0.05 K/UL (ref 0–0.2)
BASOPHILS NFR BLD: 0.3 % (ref 0–1.9)
BUN SERPL-MCNC: 63 MG/DL (ref 6–20)
CALCIUM SERPL-MCNC: 9.5 MG/DL (ref 8.7–10.5)
CHLORIDE SERPL-SCNC: 94 MMOL/L (ref 95–110)
CO2 SERPL-SCNC: 26 MMOL/L (ref 23–29)
CREAT SERPL-MCNC: 6.2 MG/DL (ref 0.5–1.4)
CRP SERPL-MCNC: 13.79 MG/DL
DIFFERENTIAL METHOD: ABNORMAL
EOSINOPHIL # BLD AUTO: 0.2 K/UL (ref 0–0.5)
EOSINOPHIL NFR BLD: 1.1 % (ref 0–8)
ERYTHROCYTE [DISTWIDTH] IN BLOOD BY AUTOMATED COUNT: 16.4 % (ref 11.5–14.5)
EST. GFR  (AFRICAN AMERICAN): 8 ML/MIN/1.73 M^2
EST. GFR  (NON AFRICAN AMERICAN): 7 ML/MIN/1.73 M^2
GLUCOSE SERPL-MCNC: 147 MG/DL (ref 70–110)
GLUCOSE SERPL-MCNC: 197 MG/DL (ref 70–110)
GLUCOSE SERPL-MCNC: 201 MG/DL (ref 70–110)
GLUCOSE SERPL-MCNC: 202 MG/DL (ref 70–110)
HCT VFR BLD AUTO: 30.8 % (ref 37–48.5)
HGB BLD-MCNC: 9.2 G/DL (ref 12–16)
IMM GRANULOCYTES # BLD AUTO: 0.35 K/UL (ref 0–0.04)
IMM GRANULOCYTES NFR BLD AUTO: 2.1 % (ref 0–0.5)
LYMPHOCYTES # BLD AUTO: 2.3 K/UL (ref 1–4.8)
LYMPHOCYTES NFR BLD: 13.5 % (ref 18–48)
MCH RBC QN AUTO: 27.5 PG (ref 27–31)
MCHC RBC AUTO-ENTMCNC: 29.9 G/DL (ref 32–36)
MCV RBC AUTO: 92 FL (ref 82–98)
MONOCYTES # BLD AUTO: 1.4 K/UL (ref 0.3–1)
MONOCYTES NFR BLD: 8.1 % (ref 4–15)
NEUTROPHILS # BLD AUTO: 12.7 K/UL (ref 1.8–7.7)
NEUTROPHILS NFR BLD: 74.9 % (ref 38–73)
NRBC BLD-RTO: 0 /100 WBC
PLATELET # BLD AUTO: 443 K/UL (ref 150–450)
PMV BLD AUTO: 9.6 FL (ref 9.2–12.9)
POTASSIUM SERPL-SCNC: 4.7 MMOL/L (ref 3.5–5.1)
RBC # BLD AUTO: 3.35 M/UL (ref 4–5.4)
SARS-COV-2 RDRP RESP QL NAA+PROBE: NEGATIVE
SODIUM SERPL-SCNC: 135 MMOL/L (ref 136–145)
WBC # BLD AUTO: 16.88 K/UL (ref 3.9–12.7)

## 2022-05-19 PROCEDURE — 97116 GAIT TRAINING THERAPY: CPT | Mod: CQ

## 2022-05-19 PROCEDURE — 36415 COLL VENOUS BLD VENIPUNCTURE: CPT | Performed by: NURSE PRACTITIONER

## 2022-05-19 PROCEDURE — 94761 N-INVAS EAR/PLS OXIMETRY MLT: CPT

## 2022-05-19 PROCEDURE — 25000003 PHARM REV CODE 250: Performed by: INTERNAL MEDICINE

## 2022-05-19 PROCEDURE — 86140 C-REACTIVE PROTEIN: CPT | Performed by: INTERNAL MEDICINE

## 2022-05-19 PROCEDURE — U0002 COVID-19 LAB TEST NON-CDC: HCPCS | Performed by: INTERNAL MEDICINE

## 2022-05-19 PROCEDURE — 80048 BASIC METABOLIC PNL TOTAL CA: CPT | Performed by: NURSE PRACTITIONER

## 2022-05-19 PROCEDURE — 25000003 PHARM REV CODE 250: Performed by: NURSE PRACTITIONER

## 2022-05-19 PROCEDURE — 85025 COMPLETE CBC W/AUTO DIFF WBC: CPT | Performed by: NURSE PRACTITIONER

## 2022-05-19 PROCEDURE — 99900035 HC TECH TIME PER 15 MIN (STAT)

## 2022-05-19 RX ORDER — HYDROCODONE BITARTRATE AND ACETAMINOPHEN 5; 325 MG/1; MG/1
1 TABLET ORAL EVERY 8 HOURS PRN
Qty: 10 TABLET | Refills: 0 | Status: ON HOLD
Start: 2022-05-19 | End: 2022-06-14 | Stop reason: HOSPADM

## 2022-05-19 RX ORDER — LOSARTAN POTASSIUM 25 MG/1
25 TABLET ORAL DAILY
Status: ON HOLD
Start: 2022-05-19 | End: 2022-06-14 | Stop reason: HOSPADM

## 2022-05-19 RX ORDER — PANTOPRAZOLE SODIUM 40 MG/1
40 TABLET, DELAYED RELEASE ORAL DAILY
Start: 2022-05-19 | End: 2022-06-14

## 2022-05-19 RX ORDER — EZETIMIBE 10 MG/1
10 TABLET ORAL DAILY
Start: 2022-05-19 | End: 2022-06-14

## 2022-05-19 RX ADMIN — THERA TABS 1 TABLET: TAB at 09:05

## 2022-05-19 RX ADMIN — CETIRIZINE HYDROCHLORIDE 10 MG: 10 TABLET, FILM COATED ORAL at 09:05

## 2022-05-19 RX ADMIN — FLUTICASONE PROPIONATE 100 MCG: 50 SPRAY, METERED NASAL at 09:05

## 2022-05-19 RX ADMIN — CALCIUM ACETATE 667 MG: 667 CAPSULE ORAL at 11:05

## 2022-05-19 RX ADMIN — OXYCODONE HYDROCHLORIDE AND ACETAMINOPHEN 500 MG: 500 TABLET ORAL at 09:05

## 2022-05-19 RX ADMIN — HYDROCODONE BITARTRATE AND ACETAMINOPHEN 1 TABLET: 5; 325 TABLET ORAL at 10:05

## 2022-05-19 RX ADMIN — ASPIRIN 81 MG: 81 TABLET, COATED ORAL at 09:05

## 2022-05-19 RX ADMIN — CALCIUM ACETATE 667 MG: 667 CAPSULE ORAL at 07:05

## 2022-05-19 RX ADMIN — AZELASTINE HYDROCHLORIDE 137 MCG: 137 SPRAY, METERED NASAL at 09:05

## 2022-05-19 RX ADMIN — AMLODIPINE BESYLATE 10 MG: 5 TABLET ORAL at 09:05

## 2022-05-19 RX ADMIN — LOSARTAN POTASSIUM 100 MG: 50 TABLET, FILM COATED ORAL at 09:05

## 2022-05-19 RX ADMIN — CALCIUM ACETATE 667 MG: 667 CAPSULE ORAL at 05:05

## 2022-05-19 NOTE — PROGRESS NOTES
INPATIENT NEPHROLOGY Progress Note  [facility] NEPHROLOGY    Leanna García  05/19/2022    Reason for consultation:  ESRD    Chief Complaint:   Chief Complaint   Patient presents with    Fever     History of Present Illness:  Ms. García is a 56-year-old female with a past medical history of ESRD on HD Monday Wednesday Friday, hypertension, IDDM2, PAF, and chronic cough who presents today with complaints of fever up to 103. It is severe.  It is associated with cough, posttussive vomiting, fatigue, sinus congestion, weakness, and a left foot ulcer.  She denies chest pain, diarrhea, dizziness, loss of consciousness.  In the ED she was noted to have a large left foot ulceration at the plantar surface and on the side of the foot.  CT of abdomen pelvis also reveals possible enteritis and bilateral perinephric fat stranding.  WBCs 21 K, troponin is 0.4 in the setting of ESRD.  She did not go to dialysis today she felt too bad.  Glucose is 446, anion gap 20, bicarb 24.     5/5  C/o foot pain.  Mild dyspnea.  No vomiting, chest pain, urinary or bowel complaints.  Weak  5/6  Currently getting wound vac placed.  Has foot pain. No respiratory distress  5/7 still spiking fevers.  Foot pain.  Stopped hd early yesterday  5/8  Tired today.  No vomiting or sob.  Tmax 101.9 at 1900 yesterday  5/9 wound vac today  5/10 no issues with HD yest- got 1u of blood- net UF 2.6L- waiting for wound vac- gave ok with PICC  5/11 febrile, BP stable, on 2L NC- seen on HD- no complaints; has wound vac; looking to [facility] at [facility]  5/12 plan for [facility]  5/13 went for I&D of L foot abscesses   5/14 no events overnight  5/15 no events overnight- awaiting SNF  5/16 VSS, no new complains. HD today.  5/17 VSS, HD in AM.  5/18 VSS, HD today.  5/19  No complaints, VSS.  Plan transfer to SNF today, TCU in [place].        Plan of Care:     ESRD on HD MWF  --continue dialysis per routine    Hypertension  --low salt diet 2g/day  --uf with  hd    Hyponatremia  --fluid restrict 1.5L/day  --140 Na dialysate    Secondary HPT  --continue binder with meals     Anemia of CKD  --Hgb is better after transfusion on 5/9- stabilizing  --continue FERMÍN with HD    Diab Foot Ulcer- MRSA  --podiatry and ID following, getting procedures PRN and abx  --wound vac in place  --dose antbx for CrCl< 10/HD    Thank you for allowing us to participate in this patient's care. We will continue to follow.    Vital Signs:  Temp Readings from Last 3 Encounters:   05/19/22 97.7 °F (36.5 °C) (Oral)   04/26/22 98.2 °F (36.8 °C)   03/10/22 97.3 °F (36.3 °C) (Tympanic)       Pulse Readings from Last 3 Encounters:   05/19/22 76   04/26/22 66   04/05/22 75       BP Readings from Last 3 Encounters:   05/19/22 (!) 161/93   04/28/22 (!) 180/82   04/26/22 (!) 174/66       Weight:  Wt Readings from Last 3 Encounters:   05/16/22 91.8 kg (202 lb 6.1 oz)   05/06/22 86.6 kg (191 lb)   04/28/22 94 kg (207 lb 5.5 oz)       Medications:  No current facility-administered medications on file prior to encounter.     Current Outpatient Medications on File Prior to Encounter   Medication Sig Dispense Refill    ascorbic acid, vitamin C, (VITAMIN C) 500 MG tablet Take 500 mg by mouth once daily.      aspirin (ECOTRIN) 81 MG EC tablet Take 81 mg by mouth once daily.      atorvastatin (LIPITOR) 80 MG tablet Take 1 tablet (80 mg total) by mouth once daily. (Patient taking differently: Take 80 mg by mouth every evening.) 90 tablet 3    blood sugar diagnostic Strp To check BG 4 times daily, to use with insurance preferred meter (Patient taking differently: 1 strip by Misc.(Non-Drug; Combo Route) route 4 (four) times daily. To check BG 4 times daily, to use with insurance preferred meter) 450 strip 3    insulin aspart U-100 (NOVOLOG FLEXPEN U-100 INSULIN) 100 unit/mL (3 mL) InPn pen Inject 5-8 units 3 times per day with food. 1 Box 6    insulin detemir U-100 (LEVEMIR FLEXTOUCH U-100 INSULN) 100 unit/mL (3 mL)  "InPn pen Inject 15-18 Units into the skin once daily. 1 Box 6    lancets Misc To use to check blood sugar 4 times daily. To use with insurance approved meter. Patient needs the round, purple one (Patient taking differently: 1 lancet by Misc.(Non-Drug; Combo Route) route 4 (four) times daily. To use to check blood sugar 4 times daily. To use with insurance approved meter. Patient needs the round, purple one) 450 each 3    metoprolol tartrate (LOPRESSOR) 25 MG tablet Take 25 mg by mouth 2 (two) times daily.      minoxidiL (LONITEN) 2.5 MG tablet Take 5 mg by mouth 2 (two) times daily.      multivitamin (THERAGRAN) per tablet Take 1 tablet by mouth once daily.      pen needle, diabetic (BD ULTRA-FINE ROBERT PEN NEEDLE) 32 gauge x 5/32" Ndle To use 4 times per day with insulin injections. (Patient taking differently: 1 pen by Misc.(Non-Drug; Combo Route) route 4 (four) times daily. To use 4 times per day with insulin injections.) 450 each 2    sucroferric oxyhydroxide (VELPHORO) 500 mg Chew Take 500 mg by mouth 3 (three) times daily.      dextrose (GLUCOSE GEL) 40 % gel Take 37.5 mLs (15,000 mg total) by mouth once as needed (hypoglycemia). 37.5 g 4    gabapentin (NEURONTIN) 300 MG capsule Take 300 mg by mouth every evening.      glucagon (BAQSIMI) 3 mg/actuation Spry 3 mg (one actuation) into a single nostril; if no response, may repeat in 15 minutes using a new intranasal device. (Patient taking differently: 3 mg by Left Nostril route as needed. 3 mg (one actuation) into a single nostril; if no response, may repeat in 15 minutes using a new intranasal device.) 2 each 1    [DISCONTINUED] blood-glucose meter (ACCU-CHEK KAYLIE PLUS METER) Misc To use to check blood sugars 4 times a day 1 each 0    [DISCONTINUED] clorazepate (TRANXENE) 3.75 MG Tab Take 7.5 mg by mouth nightly as needed.       [DISCONTINUED] fenofibrate 160 MG Tab Take 1 tablet (160 mg total) by mouth once daily. 90 tablet 3    [DISCONTINUED] " "lancing device with lancets (ACCU-CHEK SOFT DEV LANCETS) Kit To check blood sugars 4 times per day 400 each 3     Scheduled Meds:   amLODIPine  10 mg Oral Daily    ascorbic acid (vitamin C)  500 mg Oral Daily    aspirin  81 mg Oral Daily    azelastine  1 spray Nasal BID    calcium acetate(phosphat bind)  667 mg Oral TID WM    cetirizine  10 mg Oral Every other day    collagenase   Topical (Top) Daily    DAPTOmycin (CUBICIN)  IV  500 mg Intravenous Q48H    epoetin chris-epbx  100 Units/kg Intravenous Every Mon, Wed, Fri    fluticasone propionate  2 spray Each Nostril Daily    insulin detemir U-100  20 Units Subcutaneous QHS    losartan  100 mg Oral Daily    multivitamin  1 tablet Oral Daily     Continuous Infusions:  PRN Meds:.sodium chloride, sodium chloride 0.9%, sodium chloride 0.9%, acetaminophen, ALPRAZolam, benzonatate, dextrose 50%, dextrose 50%, heparin (porcine), hydrALAZINE, HYDROcodone-acetaminophen, insulin aspart U-100, lactulose, morphine, naloxone, ondansetron, ondansetron, polyethylene glycol, promethazine, sodium chloride 0.9%, sodium chloride 0.9%, sodium chloride 0.9%, sodium chloride 0.9%    Review of Systems:    Physical Exam:    BP (!) 161/93   Pulse 76   Temp 97.7 °F (36.5 °C) (Oral)   Resp 16   Ht 5' 7" (1.702 m)   Wt 91.8 kg (202 lb 6.1 oz)   SpO2 98%   BMI 31.70 kg/m²     Physical Exam  Constitutional:       Appearance: She is well-developed. She is not diaphoretic.   HENT:      Head: Normocephalic and atraumatic.   Eyes:      General: No scleral icterus.     Pupils: Pupils are equal, round, and reactive to light.   Cardiovascular:      Rate and Rhythm: Normal rate and regular rhythm.   Pulmonary:      Effort: Pulmonary effort is normal. No respiratory distress.      Breath sounds: No stridor.   Abdominal:      General: There is no distension.      Palpations: Abdomen is soft.   Musculoskeletal:         General: No deformity. Normal range of motion.      Cervical back: " Neck supple.   Skin:     General: Skin is warm and dry.      Findings: No erythema or rash.   Neurological:      Mental Status: She is alert and oriented to person, place, and time.      Cranial Nerves: No cranial nerve deficit.   Psychiatric:         Behavior: Behavior normal.       Results:  Recent Labs   Lab 05/17/22  0609 05/18/22  0445 05/19/22  0440   * 135* 135*   K 4.6 5.3* 4.7   CL 99 97 94*   CO2 21* 22* 26   BUN 71* 89* 63*   CREATININE 6.9* 8.7* 6.2*   ESTGFRAFRICA 7.0* 5.3* 8.0*   EGFRNONAA 6.1* 4.6* 7.0*   * 152* 197*       Recent Labs   Lab 05/17/22  0609 05/18/22  0445 05/19/22  0440   CALCIUM 9.1 9.6 9.5       Recent Labs   Lab 01/30/20  0705 03/10/22  0650   PTH, Intact 466.0 H 387.0 H       No results for input(s): POCTGLUCOSE in the last 168 hours.    Recent Labs   Lab 05/20/21  0500 03/10/22  0650 05/05/22  0320   Hemoglobin A1C 9.4 H >14.0 H 10.6 H       Recent Labs   Lab 05/17/22  0609 05/18/22  0445 05/19/22  0440   WBC 15.86* 13.99* 16.88*   HGB 9.4* 9.2* 9.2*   HCT 30.2* 30.2* 30.8*    425 443   MCV 90 91 92   MCHC 31.1* 30.5* 29.9*   MONO 5.8  0.9 7.1  1.0 8.1  1.4*       Recent Labs   Lab 05/19/21  0300   POC PH 7.273 L   POC PCO2 47.8 H   POC HCO3 22.1 L   POC PO2 22 LL   POC SATURATED O2 30 L   POC BE -5   Sample VENOUS     Patient care was time spent personally by me on the following activities: >  Min    · Obtaining a history  · Examination of patient.  · Providing medical care at the patients bedside.  · Developing a treatment plan with patient or surrogate and bedside caregivers  · Ordering and reviewing laboratory studies, radiographic studies, pulse oximetry.  · Ordering and performing treatments and interventions.  · Evaluation of patient's response to treatment.  · Discussions with consultants while on the unit and immediately available to the patient.  · Re-evaluation of the patient's condition.  · Documentation in the medical record.     Emily VIVEROS  JESS Overton    [facility] [place]  [address] [place] [address]  [phone] (p)  [phone] (f)

## 2022-05-19 NOTE — NURSING
Patient transfer report called to Christy at [facility]  [phone]. She will be going to room 240 as soon as housekeeping arrives and cleans it. They will call us and let us know the ETA on transportation after room is clean.

## 2022-05-19 NOTE — NURSING TRANSFER
Wound vac removed, due to we cannot send ours with the patient. Wet to dry dressing applied & wrapped with several 4x4s, abd pads and roller gauze, covered with ace wrap for transport. Christy at receiving facility notified of need for wound vac to be placed at their facility at their earliest convenience. Patient transported in receiving facility's wheel chair van. Patient's  also called and notified of patient being transferred at this time.

## 2022-05-19 NOTE — PT/OT/SLP PROGRESS
Physical Therapy Treatment    Patient Name:  Leanna García   MRN:  [id number]    Recommendations:     Discharge Recommendations:  nursing facility, skilled   Discharge Equipment Recommendations: other (see comments) (TBD at next level of care)   Barriers to discharge: increased assist with mobility    Assessment:     Leanna García is a 56 y.o. female admitted with a medical diagnosis of Bacteremia.  She presents with the following impairments/functional limitations:  weakness, impaired endurance, impaired self care skills, impaired functional mobilty, gait instability, impaired balance, impaired cardiopulmonary response to activity.  Pt agreeable to visit. Pt agreeable to attempt to ambulate in room. Pt noted to have difficulty maintaining NWB LLE and attempting to hop on RLE with more of a shuffle forward while dragging LLE behind her. Pt required verbal cuing to maintain NWB LLE. Pt tolerated session fairly.    Rehab Prognosis: Fair; patient would benefit from acute skilled PT services to address these deficits and reach maximum level of function.    Recent Surgery: Procedure(s) (LRB):  INCISION AND DRAINAGE, FOOT (Left) 6 Days Post-Op    Plan:     During this hospitalization, patient to be seen 6 x/week to address the identified rehab impairments via gait training, therapeutic activities, therapeutic exercises and progress toward the following goals:    · Plan of Care Expires:  06/16/22    Subjective     Chief Complaint: not a good day, did not give a reason  Patient/Family Comments/goals: to get to SNF  Pain/Comfort:  · Pain Rating 1: 0/10      Objective:     Communicated with RN prior to session.  Patient found left sidelying with telemetry, peripheral IV, wound vac upon PT entry to room.     General Precautions: Standard, fall   Orthopedic Precautions:LLE non weight bearing   Braces: N/A  Respiratory Status: Room air     Functional Mobility:  · Bed Mobility:     · Supine to Sit: supervision  · Sit to  Supine: supervision  · Transfers:     · Sit to Stand:  minimum assistance with rolling walker and verbal cuing for NWB LLE  · Gait: x 15' with RW and Delfina with verbal cuing for NWB LLE.      AM-PAC 6 CLICK MOBILITY          Therapeutic Activities and Exercises:   Pt educated on importance of time OOB, importance of intermittent mobility, safe techniques for transfers/ambulation, discharge recommendations/options, and use of call light for assistance and fall prevention.      Patient left HOB elevated with all lines intact, call button in reach and RN notified..    GOALS:   Multidisciplinary Problems     Physical Therapy Goals        Problem: Physical Therapy    Goal Priority Disciplines Outcome Goal Variances Interventions   Physical Therapy Goal     PT, PT/OT Ongoing, Progressing     Description: Goals to be met by: 22     Patient will increase functional independence with mobility by performin. Supine to sit with Modified Harper  2. Sit to supine with Modified Harper  3. Sit to stand transfer with Contact Guard Assistance  4. Bed to chair transfer with Contact Guard Assistance using Rolling Walker maintaining NWB L LE  5. Wheelchair propulsion x150 feet with Supervision using bilateral uppper extremities                     Time Tracking:     PT Received On: 22  PT Start Time: 935     PT Stop Time: 952  PT Total Time (min): 17 min     Billable Minutes: Gait Training 17    Treatment Type: Treatment  PT/PTA: PTA     PTA Visit Number: 1     2022

## 2022-05-19 NOTE — PLAN OF CARE
Problem: Adult Inpatient Plan of Care  Goal: Plan of Care Review  Outcome: Met  Goal: Patient-Specific Goal (Individualized)  Outcome: Met  Goal: Absence of Hospital-Acquired Illness or Injury  Outcome: Met  Goal: Optimal Comfort and Wellbeing  Outcome: Met  Goal: Readiness for Transition of Care  Outcome: Met     Problem: Diabetes Comorbidity  Goal: Blood Glucose Level Within Targeted Range  Outcome: Met     Problem: Adjustment to Illness (Sepsis/Septic Shock)  Goal: Optimal Coping  Outcome: Met     Problem: Bleeding (Sepsis/Septic Shock)  Goal: Absence of Bleeding  Outcome: Met     Problem: Glycemic Control Impaired (Sepsis/Septic Shock)  Goal: Blood Glucose Level Within Desired Range  Outcome: Met     Problem: Infection Progression (Sepsis/Septic Shock)  Goal: Absence of Infection Signs and Symptoms  Outcome: Met     Problem: Nutrition Impaired (Sepsis/Septic Shock)  Goal: Optimal Nutrition Intake  Outcome: Met     Problem: Fall Injury Risk  Goal: Absence of Fall and Fall-Related Injury  Outcome: Met     Problem: Skin Injury Risk Increased  Goal: Skin Health and Integrity  Outcome: Met     Problem: Device-Related Complication Risk (Hemodialysis)  Goal: Safe, Effective Therapy Delivery  Outcome: Met     Problem: Hemodynamic Instability (Hemodialysis)  Goal: Effective Tissue Perfusion  Outcome: Met     Problem: Infection (Hemodialysis)  Goal: Absence of Infection Signs and Symptoms  Outcome: Met     Problem: Impaired Wound Healing  Goal: Optimal Wound Healing  Outcome: Met     Problem: Infection  Goal: Absence of Infection Signs and Symptoms  Outcome: Met

## 2022-05-19 NOTE — PT/OT/SLP PROGRESS
Occupational Therapy      Patient Name:  Leanna García   MRN:  [id number]    Patient not seen today secondary to Pain. Will follow-up next service date.    5/19/2022

## 2022-05-20 NOTE — PT/OT/SLP DISCHARGE
Occupational Therapy Discharge Summary    Leanna García  MRN: [id number]   Principal Problem: Bacteremia      Patient Discharged from acute Occupational Therapy on 5/19/2022.  Please refer to prior OT note dated 5/19/2022 for functional status.    Assessment:      Patient appropriate for care in another setting.    Objective:     GOALS:   Multidisciplinary Problems     Occupational Therapy Goals     Not on file          Multidisciplinary Problems (Resolved)        Problem: Occupational Therapy    Goal Priority Disciplines Outcome Interventions   Occupational Therapy Goal   (Resolved)     OT, PT/OT Met    Description: Goals to be met by: discharge     Patient will increase functional independence with ADLs by performing:    UE Dressing with Supervision.  LE Dressing with Supervision.  Grooming while seated with Supervision.  Toileting from toilet with Supervision for hygiene and clothing management.   Supine to sit with Supervision.  Toilet transfer to toilet with Supervision.  Perform sitting/standing ADL activity while maintaining LLE NWB with no verbal cues.                     Reasons for Discontinuation of Therapy Services  Transfer to alternate level of care.      Plan:     Patient Discharged to: Skilled Nursing Facility    5/20/2022

## 2022-05-20 NOTE — DISCHARGE SUMMARY
[facility] Medicine  Discharge Summary      Patient Name: Leanna García  MRN: [id number]  Patient Class: IP- Inpatient  Admission Date: 5/4/2022  Hospital Length of Stay: 15 days  Discharge Date and Time:  05/19/2022 7:39 PM  Attending Physician: No att. providers found   Discharging Provider: Usman Stacy MD  Primary Care Provider: Stefano Lopez MD      HPI:   Ms. García is a 56-year-old female with a past medical history of ESRD on HD Monday Wednesday Friday, hypertension, IDDM2, PAF, and chronic cough who presents today with complaints of fever up to 103. It is severe.  It is associated with cough, posttussive vomiting, fatigue, sinus congestion, weakness, and a left foot ulcer.  She denies chest pain, diarrhea, dizziness, loss of consciousness.  In the ED she was noted to have a large left foot ulceration at the plantar surface and on the side of the foot.  CT of abdomen pelvis also reveals possible enteritis and bilateral perinephric fat stranding.  WBCs 21 K, troponin is 0.4 in the setting of ESRD.  She did not go to dialysis today she felt too bad.  Glucose is 446, anion gap 20, bicarb 24.       Procedure(s) (LRB):  INCISION AND DRAINAGE, FOOT (Left)      Hospital Course:   Patient  with Uncontrolled Diabetes mellitus and ESRD admitted with sepsis   Bacillus cereus bacteremia along with L foot abscess   Had enteritis/Food poisoning (ate chinese food before onset of symptoms)  Regarding Foot abscess pt had S/p Incision/drainage/debridement of  Left foot deep tissue abscess below fascia muscle and tendon  She Again had Incision and drainage below fascia of multiple abscesses left foot on 05/13  Patient was evaluated by ID/Podiatry Teams   Eventually pt was ischarged to SNF for 5 weeks of iv Daptomycin Rx  Long term Prognosis of R foot remains very poor        Goals of Care Treatment Preferences:  Code Status: Full Code      Consults:   Consults (From admission, onward)        Status  Ordering Provider     Inpatient consult to   Once        Provider:  (Not yet assigned)    Acknowledged ELI BIGGS     Inpatient consult to   Once        Provider:  (Not yet assigned)    Acknowledged ELI BIGGS     Inpatient consult to   Once        Provider:  (Not yet assigned)    Completed NIRMALA BRUNO     Inpatient consult to   Once        Provider:  (Not yet assigned)    Completed NIRMALA BRUNO     Inpatient consult to Infectious Diseases  Once        Provider:  Janki Bailon MD    Completed ELI BIGGS     Inpatient consult to Nephrology  Once        Provider:  Ashleigh Soria MD    Completed KRISTAL SOLANO     Inpatient consult to Podiatry  Once        Provider:  Alivia Bruno DPM    Completed KRISTAL SOLANO          No new Assessment & Plan notes have been filed under this hospital service since the last note was generated.  Service: Hospital Medicine    Final Active Diagnoses:    Diagnosis Date Noted POA    Chronic anemia [D64.9] 05/08/2022 Unknown    ESRD (end stage renal disease) [N18.6] 05/18/2018 Yes      Problems Resolved During this Admission:    Diagnosis Date Noted Date Resolved POA    PRINCIPAL PROBLEM:  Bacteremia [R78.81] 05/05/2022 05/19/2022 Unknown    Sepsis [A41.9]  05/19/2022 Yes    Abscess of left foot [L02.612] 05/08/2022 05/19/2022 Yes    Diabetic ulcer of left midfoot [E11.621, L97.429] 05/05/2022 05/19/2022 Yes    Discharge planning issues [Z02.9] 05/11/2022 05/19/2022 Not Applicable    Foot infection [L08.9]  05/19/2022 Yes    Diabetic foot infection [E11.628, L08.9]  05/19/2022 Yes    Enteritis [K52.9] 05/05/2022 05/19/2022 Yes    Hypokalemia [E87.6] 05/05/2022 05/19/2022 Yes    Elevated troponin [R77.8] 05/05/2022 05/19/2022 Yes    Acute bronchitis [J20.9]  05/19/2022 Unknown    Cough [R05.9]  05/19/2022 Unknown    Type 2 diabetes mellitus with hyperglycemia, without long-term current use of insulin  [E11.65] 06/25/2020 05/05/2022 Yes    Type 2 diabetes mellitus with kidney complication, with long-term current use of insulin [E11.29, Z79.4] 06/25/2020 05/19/2022 Not Applicable       Discharged Condition: good    Disposition: Skilled Nursing Facility    Follow Up:   Contact information for follow-up providers     Ricardo Bruno DPM Follow up in 1 month(s).    Specialties: Podiatry, Surgery, Wound Care  Contact information:  [address]  [address]  [place] [address]  [phone]             Janki Bailon MD Follow up in 1 month(s).    Specialty: Infectious Diseases  Contact information:  [address]  [address]  [place] [address]  [phone]                   Contact information for after-discharge care     Destination     [facility] .    Service: Long Term Acute Care  Contact information:  [address]  [address] Floor  [facility] [address]  [phone]                 Dialysis/Infusion     [facility] .    Service: Dialysis  Contact information:  [phone]                           Patient Instructions:      Diet renal       Significant Diagnostic Studies: Labs:   CMP   Recent Labs   Lab 05/18/22  0445 05/19/22  0440   * 135*   K 5.3* 4.7   CL 97 94*   CO2 22* 26   * 197*   BUN 89* 63*   CREATININE 8.7* 6.2*   CALCIUM 9.6 9.5   ANIONGAP 16 15   ESTGFRAFRICA 5.3* 8.0*   EGFRNONAA 4.6* 7.0*    and CBC   Recent Labs   Lab 05/18/22  0445 05/19/22  0440   WBC 13.99* 16.88*   HGB 9.2* 9.2*   HCT 30.2* 30.8*    443       Pending Diagnostic Studies:     None         Medications:  Reconciled Home Medications:      Medication List      START taking these medications    HYDROcodone-acetaminophen 5-325 mg per tablet  Commonly known as: NORCO  Take 1 tablet by mouth every 8 (eight) hours as needed for Pain.     sodium chloride 0.9% SolP 50 mL with DAPTOmycin 500 mg SolR 500 mg  Inject 500 mg into the vein every 48 hours.  Start taking on: May 20, 2022    "     CHANGE how you take these medications    blood sugar diagnostic Strp  To check BG 4 times daily, to use with insurance preferred meter  What changed:   · how much to take  · how to take this  · when to take this     lancets Misc  To use to check blood sugar 4 times daily. To use with insurance approved meter. Patient needs the round, purple one  What changed:   · how much to take  · how to take this  · when to take this     pen needle, diabetic 32 gauge x 5/32" Ndle  Commonly known as: BD ULTRA-FINE ROBERT PEN NEEDLE  To use 4 times per day with insulin injections.  What changed:   · how much to take  · how to take this  · when to take this        CONTINUE taking these medications    aspirin 81 MG EC tablet  Commonly known as: ECOTRIN  Take 81 mg by mouth once daily.     ezetimibe 10 mg tablet  Commonly known as: ZETIA  Take 1 tablet (10 mg total) by mouth once daily.     insulin aspart U-100 100 unit/mL (3 mL) Inpn pen  Commonly known as: NovoLOG Flexpen U-100 Insulin  Inject 5-8 units 3 times per day with food.     LEVEMIR FLEXTOUCH U-100 INSULN 100 unit/mL (3 mL) Inpn pen  Generic drug: insulin detemir U-100  Inject 15-18 Units into the skin once daily.     losartan 25 MG tablet  Commonly known as: COZAAR  Take 1 tablet (25 mg total) by mouth once daily.     metoprolol tartrate 25 MG tablet  Commonly known as: LOPRESSOR  Take 25 mg by mouth 2 (two) times daily.     multivitamin per tablet  Commonly known as: THERAGRAN  Take 1 tablet by mouth once daily.     pantoprazole 40 MG tablet  Commonly known as: PROTONIX  Take 1 tablet (40 mg total) by mouth once daily.        STOP taking these medications    amLODIPine 10 MG tablet  Commonly known as: NORVASC     ascorbic acid (vitamin C) 500 MG tablet  Commonly known as: VITAMIN C     atorvastatin 80 MG tablet  Commonly known as: LIPITOR     BAQSIMI 3 mg/actuation Spry  Generic drug: glucagon     blood-glucose meter Misc  Commonly known as: ACCU-CHEK KAYLIE PLUS " METER     clorazepate 3.75 MG Tab  Commonly known as: TRANXENE     dextrose 40 % gel  Commonly known as: Glucose GeL     doxercalciferoL 4 mcg/2 mL injection  Commonly known as: HECTOROL     ergocalciferol 50,000 unit Cap  Commonly known as: ERGOCALCIFEROL     EScitalopram oxalate 10 MG tablet  Commonly known as: LEXAPRO     fenofibrate 160 MG Tab     gabapentin 300 MG capsule  Commonly known as: NEURONTIN     lancing device with lancets Kit  Commonly known as: ACCU-CHEK SOFT DEV LANCETS     minoxidiL 2.5 MG tablet  Commonly known as: LONITEN     MIRCERA 30 mcg/0.3 mL Syrg  Generic drug: epoetin beta, methoxy peg     VELPHORO 500 mg Chew  Generic drug: sucroferric oxyhydroxide            Indwelling Lines/Drains at time of discharge:   Lines/Drains/Airways     Drain  Duration                Hemodialysis AV Fistula Left upper arm -- days              Physical Exam   Constitutional: She is oriented to person, place, and time.   Cardiovascular: Normal rate.   Neurological: She is alert and oriented to person, place, and time.     Time spent on the discharge of patient: 44  minutes         Usman Stacy MD  Department of Hospital Medicine  [facility]

## 2022-05-23 PROBLEM — E87.1 HYPONATREMIA: Status: ACTIVE | Noted: 2022-05-23

## 2022-06-01 NOTE — PATIENT INSTRUCTIONS
Your Diabetes Foot Care Program    Every day you depend on your feet to keep you moving. But when you have diabetes, your feet need special care. Even a small foot problem can become very serious. So dont take your feet for granted. By working with your diabetes healthcare team, you can learn how to protect your feet and keep them healthy.  Evaluating your feet  An evaluation helps your healthcare provider check the condition of your feet. The evaluation includes a review of your diabetes history and overall health. It may also include a foot exam, X-rays, or other tests. These can help show problems beneath the skin that you cant see or feel.  Medical history  You will be asked about your overall health and any history of foot problems. Youll also discuss your diabetes history, such as whether your blood sugar level has changed over time. It also includes questions about sensations of pain, tingling, pins and needles, or numbness. Your healthcare provider will also want to know if you have high blood pressure and heart disease, or if you smoke. Be sure to mention any medicines (including over-the-counter), supplements, or herbal remedies you take.  Foot exam  A foot exam checks the condition of different parts of your foot. First, your skin and nails are examined for any signs of infection. Blood flow is checked by feeling for the pulses in each foot. You may also have tests to study the nerves in the foot. These include using a small filament (wire) to see how sensitive your feet are. In certain cases, you will be asked to walk a short distance to check for bone, joint, and muscle problems.  Diagnostic tests  If needed, your healthcare provider will suggest certain tests to learn more about your feet. These include:  Doppler tests to measure blood flow in the feet and lower leg.  X-rays, which can show bone or joint problems.  Other imaging tests, such as an MRI (magnetic resonance imaging), bone scan, and CT  (computed tomography) scan. These can help show bone infections.  Other tests, such as vascular tests, which study the blood flow in your feet and legs. You may also have nerve studies to learn how sensitive your feet are.  Creating a foot care program  Based on the evaluation, your healthcare provider will create a foot care program for you. Your program may be as simple as starting a daily self-care routine and changing the types of shoes your wear. It may also involve treating minor foot problems, such as a corn or blister. In some cases, surgery will be needed to treat an infection or mechanical problems, such as hammer toes.  Preventing problems  When you have diabetes, its easier to prevent problems than to treat them later on. So see your healthcare team for regular checkups and foot care. Your healthcare team can also help you learn more about caring for your feet at home. For example, you may be told to avoid walking barefoot. Or you may be told that special footwear is needed to protect your feet.  Have regular checkups  Foot problems can develop quickly. So be sure to follow your healthcare teams schedule for regular checkups. During office visits, take off your shoes and socks as soon as you get in the exam room. Ask your healthcare provider to examine your feet for problems. This will make it easier to find and treat small skin irritations before they get worse. Regular checkups can also help keep track of the blood flow and feeling in your feet. If you have neuropathy (lack of feeling in your feet), you will need to have checkups more often.  Learn about self-care  The more you know about diabetes and your feet, the easier it will be to prevent problems. Members of your healthcare team can teach you how to inspect your feet and teach you to look for warning signs. They can also give you other foot care tips. During office visits, be sure to ask any questions you have.  Date Last Reviewed: 7/1/2016  ©  0275-2682 [employer]. [address] [place] [address], [place] [address]. All rights reserved. This information is not intended as a substitute for professional medical care. Always follow your healthcare professional's instructions.       Simple Skin Ulcer  A skin ulcer is a sore on the skin. Skin ulcers often form when blood circulation is impaired. Being bed- or wheelchair-bound can cause pressure that may lead to skin ulcers. Ulcers are generally round areas of red, swollen, thickened skin around a crater-like depression. They are often very slow to heal. If a skin ulcer isn't properly treated, it may become infected. If the infection spreads, it can cause serious health issues.    Symptoms of a skin ulcer include:  Reddish area on the skin  Skin color and texture changes  Swelling  Wound that isn't healing  Crater in the skin  Pain  Drainage or pus    Causes  There are many causes of skin ulcers. Some of these include:  Decreased blood flow to a part of the skin, vascular insufficiency  Trauma  Lack of movement of a part of the body for long periods of time  Infection  Poor hygiene  Varicose veins  Vitamin deficiency    Pressure ulcers  Pressure ulcers are a type of ulcer most commonly seen in people who are confined to bed or a wheelchair. They are caused by prolonged pressure to a spot on the skin. Pressure ulcers usually occur on the back, buttocks, or backs or sides of the legs, arms, or feet (especially the heels).    Home care  You may be prescribed antibiotics to prevent infection. If this is the case, be sure to take all of the medicine, even if your symptoms get better. You may also be given medicines to help relieve pain. Follow the healthcare providers instructions when using these medicines.    General care  Care for the skin ulcer as instructed. Always wash your hands with soap and warm water before and after caring for your wound.  Cover the ulcer with a clean, dry bandage. Remove and  change the bandage as instructed. If the bandage becomes wet or dirty, change it as soon as possible.  Follow the doctors instructions about washing. You can shower, but do not soak the healing ulcer until the doctor says its OK.  Do not scratch, rub, or pick at the healing skin.  Check the area every day for signs of infection, such as increasing pain, redness, warmth, red streaking, swelling, or pus draining from the ulcer.  When resting, raise the area where the ulcer is above the level of the heart.  Avoid smoking or drinking alcohol, as these can delay wound healing.  If you are able, try to walk regularly. This can help with circulation.  Avoid prolonged standing or sitting in one position.  The following tips can help prevent future skin ulcers:  Know your risks for skin ulcers.  Keep the skin clean and dry.  Reposition frequently.  Use protective devices such as pillows, foam wedges, and heel protectors for the knees, ankles, and heels.  Avoid immobilization.    Follow-up care  Follow up with your healthcare provider, or as advised.    When to seek medical advice  Call your healthcare provider right away if any of these occur:  Fever of 100.4°F (38°C) or higher, or as advised by your healthcare provider  Signs of infection. These include increasing pain, warmth, redness, or pus draining from the skin ulcer.  Bleeding from the skin ulcer  Pain in or around the ulcer that doesn't get better even with medicines  Increased swelling  Changes in skin color    Date Last Reviewed: 9/1/2016  © 3346-6489 [employer]. [address] [place] [address], [place] [address]. All rights reserved. This information is not intended as a substitute for professional medical care. Always follow your healthcare professional's instructions.

## 2022-06-02 ENCOUNTER — OFFICE VISIT (OUTPATIENT)
Dept: PODIATRY | Facility: CLINIC | Age: 57
End: 2022-06-02
Payer: MEDICARE

## 2022-06-02 VITALS — WEIGHT: 190 LBS | BODY MASS INDEX: 29.82 KG/M2 | HEIGHT: 67 IN | RESPIRATION RATE: 16 BRPM

## 2022-06-02 DIAGNOSIS — E11.628 DIABETIC FOOT INFECTION: ICD-10-CM

## 2022-06-02 DIAGNOSIS — L89.629 PRESSURE INJURY OF SKIN OF LEFT HEEL, UNSPECIFIED INJURY STAGE: ICD-10-CM

## 2022-06-02 DIAGNOSIS — M79.672 PAIN IN LEFT FOOT: ICD-10-CM

## 2022-06-02 DIAGNOSIS — E11.42 DIABETIC POLYNEUROPATHY ASSOCIATED WITH TYPE 2 DIABETES MELLITUS: Primary | ICD-10-CM

## 2022-06-02 DIAGNOSIS — M25.572 PAIN AND SWELLING OF LEFT ANKLE: ICD-10-CM

## 2022-06-02 DIAGNOSIS — M25.472 PAIN AND SWELLING OF LEFT ANKLE: ICD-10-CM

## 2022-06-02 DIAGNOSIS — L97.523 ULCER OF LEFT FOOT WITH NECROSIS OF MUSCLE: ICD-10-CM

## 2022-06-02 DIAGNOSIS — L08.9 DIABETIC FOOT INFECTION: ICD-10-CM

## 2022-06-02 DIAGNOSIS — E11.621 TYPE 2 DIABETES MELLITUS WITH FOOT ULCER, UNSPECIFIED WHETHER LONG TERM INSULIN USE: ICD-10-CM

## 2022-06-02 DIAGNOSIS — L97.509 TYPE 2 DIABETES MELLITUS WITH FOOT ULCER, UNSPECIFIED WHETHER LONG TERM INSULIN USE: ICD-10-CM

## 2022-06-02 PROCEDURE — 4010F PR ACE/ARB THEARPY RXD/TAKEN: ICD-10-PCS | Mod: CPTII,S$GLB,, | Performed by: PODIATRIST

## 2022-06-02 PROCEDURE — 1160F PR REVIEW ALL MEDS BY PRESCRIBER/CLIN PHARMACIST DOCUMENTED: ICD-10-PCS | Mod: CPTII,S$GLB,, | Performed by: PODIATRIST

## 2022-06-02 PROCEDURE — 4010F ACE/ARB THERAPY RXD/TAKEN: CPT | Mod: CPTII,S$GLB,, | Performed by: PODIATRIST

## 2022-06-02 PROCEDURE — 99214 OFFICE O/P EST MOD 30 MIN: CPT | Mod: 24,S$GLB,, | Performed by: PODIATRIST

## 2022-06-02 PROCEDURE — 3066F PR DOCUMENTATION OF TREATMENT FOR NEPHROPATHY: ICD-10-PCS | Mod: CPTII,S$GLB,, | Performed by: PODIATRIST

## 2022-06-02 PROCEDURE — 1111F PR DISCHARGE MEDS RECONCILED W/ CURRENT OUTPATIENT MED LIST: ICD-10-PCS | Mod: CPTII,S$GLB,, | Performed by: PODIATRIST

## 2022-06-02 PROCEDURE — 3066F NEPHROPATHY DOC TX: CPT | Mod: CPTII,S$GLB,, | Performed by: PODIATRIST

## 2022-06-02 PROCEDURE — 99214 PR OFFICE/OUTPT VISIT, EST, LEVL IV, 30-39 MIN: ICD-10-PCS | Mod: 24,S$GLB,, | Performed by: PODIATRIST

## 2022-06-02 PROCEDURE — 1111F DSCHRG MED/CURRENT MED MERGE: CPT | Mod: CPTII,S$GLB,, | Performed by: PODIATRIST

## 2022-06-02 PROCEDURE — 3008F PR BODY MASS INDEX (BMI) DOCUMENTED: ICD-10-PCS | Mod: CPTII,S$GLB,, | Performed by: PODIATRIST

## 2022-06-02 PROCEDURE — 3008F BODY MASS INDEX DOCD: CPT | Mod: CPTII,S$GLB,, | Performed by: PODIATRIST

## 2022-06-02 PROCEDURE — 1159F PR MEDICATION LIST DOCUMENTED IN MEDICAL RECORD: ICD-10-PCS | Mod: CPTII,S$GLB,, | Performed by: PODIATRIST

## 2022-06-02 PROCEDURE — 3052F PR MOST RECENT HEMOGLOBIN A1C LEVEL 8.0 - < 9.0%: ICD-10-PCS | Mod: CPTII,S$GLB,, | Performed by: PODIATRIST

## 2022-06-02 PROCEDURE — 1159F MED LIST DOCD IN RCRD: CPT | Mod: CPTII,S$GLB,, | Performed by: PODIATRIST

## 2022-06-02 PROCEDURE — 1160F RVW MEDS BY RX/DR IN RCRD: CPT | Mod: CPTII,S$GLB,, | Performed by: PODIATRIST

## 2022-06-02 PROCEDURE — 3052F HG A1C>EQUAL 8.0%<EQUAL 9.0%: CPT | Mod: CPTII,S$GLB,, | Performed by: PODIATRIST

## 2022-06-02 RX ORDER — POLYETHYLENE GLYCOL-3350 AND ELECTROLYTES 236; 6.74; 5.86; 2.97; 22.74 G/274.31G; G/274.31G; G/274.31G; G/274.31G; G/274.31G
POWDER, FOR SOLUTION ORAL
Status: ON HOLD | COMMUNITY
Start: 2022-04-14 | End: 2022-06-14 | Stop reason: HOSPADM

## 2022-06-02 RX ORDER — MINOXIDIL 10 MG/1
10 TABLET ORAL 2 TIMES DAILY
Status: ON HOLD | COMMUNITY
Start: 2022-05-22 | End: 2022-06-14 | Stop reason: HOSPADM

## 2022-06-02 NOTE — PROGRESS NOTES
"  [address]  [place] [address]  Phone: [phone]   [phone]    Patient's PCP:Stefano Lopez MD  Referring Provider: No ref. provider found    Subjective:      Chief Complaint:: Follow-up (Left foot wounds )    NISH García is a 56 y.o. female who presents today for follow up done on 05/13/2022, Procedure:  Incision and drainage below fascia of multiple abscesses left foot.  Presents with a dressing on left foot.  Had wound vac on nurse did re-apply it however, stated the tape they used was causing excess maceration. Presents with dressing intact on left foot with abd padding, gauze and coban.    Patient states she was just admitted to nursing home this morning.      Systemic Doctor: Yani Sweeney MD  Date Last Seen: 9-14-21  Blood Sugar: 157  Hemoglobin A1c: 8.3 (05/29/2022)       Vitals:    06/02/22 1435   Resp: 16   Weight: 86.2 kg (190 lb)   Height: 5' 7" (1.702 m)   PainSc: 10-Worst pain ever      Shoe Size:     Past Surgical History:   Procedure Laterality Date    COLONOSCOPY N/A 10/5/2016    Procedure: COLONOSCOPY;  Surgeon: Pillo Chanel MD;  Location: [facility];  Service: Endoscopy;  Laterality: N/A;    COLONOSCOPY N/A 4/26/2022    Procedure: COLONOSCOPY;  Surgeon: Saravanan Rachel MD;  Location: [facility];  Service: Endoscopy;  Laterality: N/A;    HERNIA REPAIR Bilateral 11/22/2016    inguinal    HYSTERECTOMY      INCISION AND DRAINAGE FOOT Left 5/13/2022    Procedure: INCISION AND DRAINAGE, FOOT;  Surgeon: Ricardo Bruno DPM;  Location: [facility];  Service: Podiatry;  Laterality: Left;    OOPHORECTOMY       Past Medical History:   Diagnosis Date    A-fib     resolved per patient    Arthritis     Bronchitis     Cardiovascular event risk, ASCVD 10-year risk 6.7% 10/15/2016    Diabetes mellitus     Diabetes mellitus type II     Diabetic ulcer of left midfoot 5/5/2022    Disorder of kidney and ureter     Encounter for blood transfusion     ESRD (end " stage renal disease) 5/18/2018    MANJINDER (generalized anxiety disorder) 10/25/2016    History of colon polyps 11/02/2016    Hyperlipidemia     Hypertension     Stroke      Family History   Problem Relation Age of Onset    Hyperlipidemia Mother     Hypertension Mother     Hypertension Father     Diabetes Father     Diabetes Sister     Diabetes Sister     Diabetes Maternal Aunt     Diabetes Maternal Grandmother     Heart disease Maternal Grandmother     Cancer Paternal Grandmother     Breast cancer Neg Hx     Colon cancer Neg Hx     Ovarian cancer Neg Hx         Social History:   Marital Status:   Alcohol History:  reports no history of alcohol use.  Tobacco History:  reports that she has never smoked. She has never used smokeless tobacco.  Drug History:  reports no history of drug use.    Review of patient's allergies indicates:   Allergen Reactions    Dilaudid [hydromorphone] Nausea And Vomiting    Lortab [hydrocodone-acetaminophen] Itching    Vancomycin Itching    Chlorhexidine Itching       No current facility-administered medications for this visit.     No current outpatient medications on file.     Facility-Administered Medications Ordered in Other Visits   Medication Dose Route Frequency Provider Last Rate Last Admin    acetaminophen tablet 650 mg  650 mg Oral Q4H PRN Abel Guillen MD   650 mg at 06/02/22 0838    ALPRAZolam tablet 0.5 mg  0.5 mg Oral TID PRN Abel Guillen MD   0.5 mg at 06/03/22 2059    amLODIPine tablet 10 mg  10 mg Oral Daily Abel Guillen MD   10 mg at 06/03/22 0833    ascorbic acid (vitamin C) tablet 500 mg  500 mg Oral Daily Abel Guillen MD   500 mg at 06/03/22 0833    aspirin EC tablet 81 mg  81 mg Oral Daily Abel Guillen MD   81 mg at 06/03/22 0833    azelastine 137 mcg (0.1 %) nasal spray 137 mcg  1 spray Nasal BID Abel Guillen MD   137 mcg at 06/03/22 2059    benzonatate capsule 100 mg  100 mg Oral TID PRN Abel Guillen MD         calcium acetate(phosphat bind) capsule 667 mg  667 mg Oral TID  Abel Guillen MD   667 mg at 06/03/22 1636    cetirizine tablet 10 mg  10 mg Oral Every other day Abel Guillen MD   10 mg at 06/02/22 0839    DAPTOmycin (CUBICIN) 500 mg in sodium chloride 0.9% IVPB  500 mg Intravenous Q48H Abel Guillen  mL/hr at 06/03/22 2100 500 mg at 06/03/22 2100    fluticasone propionate 50 mcg/actuation nasal spray 100 mcg  2 spray Each Nostril Daily Abel Guillen MD   100 mcg at 06/03/22 0831    glucose chewable tablet 16 g  16 g Oral PRN Abel Guillen MD        heparin (porcine) injection 5,000 Units  5,000 Units Intravenous PRN Abel Guillen MD        hydrALAZINE tablet 50 mg  50 mg Oral Q12H Godfrey Boone MD   50 mg at 06/03/22 2059    HYDROcodone-acetaminophen 5-325 mg per tablet 1 tablet  1 tablet Oral Q6H PRN Abel Guillen MD   1 tablet at 06/03/22 1636    insulin aspart U-100 pen 1-6 Units  1-6 Units Subcutaneous PRN Abel Guillen MD   3 Units at 06/02/22 2058    insulin aspart U-100 pen 3 Units  3 Units Subcutaneous TIDWM Abel Guillen MD   3 Units at 06/03/22 1635    insulin detemir U-100 pen 15 Units  15 Units Subcutaneous QHS Abel Guillen MD   15 Units at 06/03/22 2101    lactulose 20 gram/30 mL solution Soln 20 g  20 g Oral BID PRN Abel Guillen MD   20 g at 05/26/22 0916    losartan tablet 100 mg  100 mg Oral Daily Abel Guillen MD   100 mg at 06/03/22 0833    multivitamin tablet  1 tablet Oral Daily Abel Guillen MD   1 tablet at 06/03/22 0833    ondansetron disintegrating tablet 8 mg  8 mg Oral Q8H PRN Abel Guillen MD   8 mg at 06/03/22 1636    oxybutynin tablet 5 mg  5 mg Oral TID Godfrey Boone MD   5 mg at 06/03/22 2059    polyethylene glycol packet 17 g  17 g Oral BID PRN Abel Guillen MD        promethazine tablet 25 mg  25 mg Oral Q6H PRN Abel Guillen MD        sodium chloride 0.9% flush 10 mL  10 mL Intravenous PRN Abel Guillen MD            Review of Systems   Constitutional: Negative for chills, fatigue, fever and unexpected weight change.   HENT: Negative for hearing loss and trouble swallowing.    Eyes: Negative for photophobia and visual disturbance.   Respiratory: Negative for cough, shortness of breath and wheezing.    Cardiovascular: Negative for chest pain, palpitations and leg swelling.   Gastrointestinal: Negative for abdominal pain and nausea.   Genitourinary: Negative for dysuria and frequency.   Musculoskeletal: Positive for arthralgias, gait problem, joint swelling and myalgias. Negative for back pain.   Skin: Positive for color change and wound. Negative for rash.   Neurological: Positive for weakness and numbness. Negative for tremors, seizures and headaches.   Hematological: Bruises/bleeds easily.         Objective:        Physical Exam:   Foot Exam    General  Orientation: alert and oriented to person, place, and time   Affect: appropriate       Left Foot/Ankle      Inspection and Palpation  Ecchymosis: none  Tenderness: lisfranc joint and midtarsal joint   Swelling: dorsum, midtarsal joint and lisfranc joint   Arch: pes planus  Skin Exam: skin changes, abnormal color and ulcer; no drainage and no erythema   Neurovascular  Dorsalis pedis: 1+  Posterior tibial: 1+  Capillary refill: 3+  Saphenous nerve sensation: diminished  Tibial nerve sensation: diminished  Superficial peroneal nerve sensation: diminished  Deep peroneal nerve sensation: diminished  Sural nerve sensation: diminished          Physical Exam  Cardiovascular:      Pulses:           Dorsalis pedis pulses are 1+ on the left side.        Posterior tibial pulses are 1+ on the left side.   Musculoskeletal:        Feet:    Feet:      Left foot:      Skin integrity: Ulcer present. No erythema.                   Vascular Exam       Left Pulses  Dorsalis Pedis:      1+  Posterior Tibial:      1+             Imaging: none            Assessment:       1. Diabetic  polyneuropathy associated with type 2 diabetes mellitus    2. Type 2 diabetes mellitus with foot ulcer, unspecified whether long term insulin use    3. Ulcer of left foot with necrosis of muscle    4. Pressure injury of skin of left heel, unspecified injury stage    5. Diabetic foot infection    6. Pain and swelling of left ankle    7. Pain in left foot      Plan:   Diabetic polyneuropathy associated with type 2 diabetes mellitus    Type 2 diabetes mellitus with foot ulcer, unspecified whether long term insulin use    Ulcer of left foot with necrosis of muscle    Pressure injury of skin of left heel, unspecified injury stage    Diabetic foot infection  -     Ambulatory referral/consult to Wound Clinic; Future; Expected date: 06/03/2022    Pain and swelling of left ankle    Pain in left foot      Follow up pt will be referred to outpatient wound care center.    Procedures        I discussed with the patient and her  that wound vac therapy will be absolutely necessary to heal her foot. I also explained that if the wound vac is not being changed regularly then this can harm the wounds and foot. I am going to update the nursing home orders to ensure they are changing the wound vac 3x weekly, as they should have been doing. I also discussed the importance of completely eliminating weight on the left heel. I explained they should be keeping pillows under the lower leg to fully offload the heel. Orders were also updated for this. I am also placing a referral to the outpatient wound care center to further evaluation and management.       Counseling:     I provided patient education verbally regarding:   Patient diagnosis, treatment options, as well as alternatives, risks, and benefits.     This note was created using Dragon voice recognition software that occasionally misinterpreted phrases or words.

## 2022-06-07 ENCOUNTER — OFFICE VISIT (OUTPATIENT)
Dept: WOUND CARE | Facility: HOSPITAL | Age: 57
End: 2022-06-07
Attending: PODIATRIST
Payer: MEDICARE

## 2022-06-07 VITALS
HEART RATE: 81 BPM | TEMPERATURE: 99 F | DIASTOLIC BLOOD PRESSURE: 71 MMHG | SYSTOLIC BLOOD PRESSURE: 167 MMHG | RESPIRATION RATE: 17 BRPM

## 2022-06-07 DIAGNOSIS — E11.649 TYPE 2 DIABETES MELLITUS WITH HYPOGLYCEMIA UNAWARENESS: ICD-10-CM

## 2022-06-07 DIAGNOSIS — E11.621 DIABETIC ULCER OF LEFT MIDFOOT ASSOCIATED WITH TYPE 2 DIABETES MELLITUS, WITH NECROSIS OF MUSCLE: ICD-10-CM

## 2022-06-07 DIAGNOSIS — R60.0 BILATERAL LOWER EXTREMITY EDEMA: ICD-10-CM

## 2022-06-07 DIAGNOSIS — I15.2 HYPERTENSION ASSOCIATED WITH DIABETES: Chronic | ICD-10-CM

## 2022-06-07 DIAGNOSIS — E11.628 DIABETIC FOOT INFECTION: ICD-10-CM

## 2022-06-07 DIAGNOSIS — E11.59 HYPERTENSION ASSOCIATED WITH DIABETES: Chronic | ICD-10-CM

## 2022-06-07 DIAGNOSIS — L03.119 CELLULITIS AND ABSCESS OF FOOT: ICD-10-CM

## 2022-06-07 DIAGNOSIS — E11.22 TYPE 2 DIABETES MELLITUS WITH CHRONIC KIDNEY DISEASE ON CHRONIC DIALYSIS, WITH LONG-TERM CURRENT USE OF INSULIN: ICD-10-CM

## 2022-06-07 DIAGNOSIS — R26.2 DIFFICULTY IN WALKING, NOT ELSEWHERE CLASSIFIED: ICD-10-CM

## 2022-06-07 DIAGNOSIS — Z86.73 HISTORY OF TIA (TRANSIENT ISCHEMIC ATTACK): ICD-10-CM

## 2022-06-07 DIAGNOSIS — Z79.4 TYPE 2 DIABETES MELLITUS WITH HYPOGLYCEMIA AND COMA, WITH LONG-TERM CURRENT USE OF INSULIN: ICD-10-CM

## 2022-06-07 DIAGNOSIS — L02.619 CELLULITIS AND ABSCESS OF FOOT: ICD-10-CM

## 2022-06-07 DIAGNOSIS — E11.641 TYPE 2 DIABETES MELLITUS WITH HYPOGLYCEMIA AND COMA, WITH LONG-TERM CURRENT USE OF INSULIN: ICD-10-CM

## 2022-06-07 DIAGNOSIS — L97.523 ULCER OF LEFT FOOT WITH NECROSIS OF MUSCLE: Primary | ICD-10-CM

## 2022-06-07 DIAGNOSIS — L08.9 DIABETIC FOOT INFECTION: ICD-10-CM

## 2022-06-07 DIAGNOSIS — Z91.89 AT HIGH RISK FOR INADEQUATE NUTRITIONAL INTAKE: ICD-10-CM

## 2022-06-07 DIAGNOSIS — L97.423 DIABETIC ULCER OF LEFT MIDFOOT ASSOCIATED WITH TYPE 2 DIABETES MELLITUS, WITH NECROSIS OF MUSCLE: ICD-10-CM

## 2022-06-07 DIAGNOSIS — N18.6 ESRD (END STAGE RENAL DISEASE): ICD-10-CM

## 2022-06-07 DIAGNOSIS — Z91.89 AT RISK FOR READMISSION TO HOSPITAL: ICD-10-CM

## 2022-06-07 DIAGNOSIS — Z79.4 TYPE 2 DIABETES MELLITUS WITH CHRONIC KIDNEY DISEASE ON CHRONIC DIALYSIS, WITH LONG-TERM CURRENT USE OF INSULIN: ICD-10-CM

## 2022-06-07 DIAGNOSIS — Z99.2 TYPE 2 DIABETES MELLITUS WITH CHRONIC KIDNEY DISEASE ON CHRONIC DIALYSIS, WITH LONG-TERM CURRENT USE OF INSULIN: ICD-10-CM

## 2022-06-07 DIAGNOSIS — N18.6 TYPE 2 DIABETES MELLITUS WITH CHRONIC KIDNEY DISEASE ON CHRONIC DIALYSIS, WITH LONG-TERM CURRENT USE OF INSULIN: ICD-10-CM

## 2022-06-07 PROCEDURE — 4010F PR ACE/ARB THEARPY RXD/TAKEN: ICD-10-PCS | Mod: CPTII,,, | Performed by: PODIATRIST

## 2022-06-07 PROCEDURE — 87075 CULTR BACTERIA EXCEPT BLOOD: CPT | Mod: TXP | Performed by: PODIATRIST

## 2022-06-07 PROCEDURE — 11043 DBRDMT MUSC&/FSCA 1ST 20/<: CPT | Mod: ,,, | Performed by: PODIATRIST

## 2022-06-07 PROCEDURE — 11042 PR DEBRIDEMENT, SKIN, SUB-Q TISSUE,=<20 SQ CM: ICD-10-PCS | Mod: 59,,, | Performed by: PODIATRIST

## 2022-06-07 PROCEDURE — 3078F DIAST BP <80 MM HG: CPT | Mod: CPTII,,, | Performed by: PODIATRIST

## 2022-06-07 PROCEDURE — 11042 DBRDMT SUBQ TIS 1ST 20SQCM/<: CPT | Mod: 59,,, | Performed by: PODIATRIST

## 2022-06-07 PROCEDURE — 3077F PR MOST RECENT SYSTOLIC BLOOD PRESSURE >= 140 MM HG: ICD-10-PCS | Mod: CPTII,,, | Performed by: PODIATRIST

## 2022-06-07 PROCEDURE — 1159F MED LIST DOCD IN RCRD: CPT | Mod: CPTII,,, | Performed by: PODIATRIST

## 2022-06-07 PROCEDURE — 99214 PR OFFICE/OUTPT VISIT, EST, LEVL IV, 30-39 MIN: ICD-10-PCS | Mod: 25,,, | Performed by: PODIATRIST

## 2022-06-07 PROCEDURE — 11043 DBRDMT MUSC&/FSCA 1ST 20/<: CPT | Mod: NTX | Performed by: PODIATRIST

## 2022-06-07 PROCEDURE — 1160F RVW MEDS BY RX/DR IN RCRD: CPT | Mod: CPTII,,, | Performed by: PODIATRIST

## 2022-06-07 PROCEDURE — 3066F PR DOCUMENTATION OF TREATMENT FOR NEPHROPATHY: ICD-10-PCS | Mod: CPTII,,, | Performed by: PODIATRIST

## 2022-06-07 PROCEDURE — 11042 DBRDMT SUBQ TIS 1ST 20SQCM/<: CPT | Mod: 59 | Performed by: PODIATRIST

## 2022-06-07 PROCEDURE — 1111F PR DISCHARGE MEDS RECONCILED W/ CURRENT OUTPATIENT MED LIST: ICD-10-PCS | Mod: CPTII,,, | Performed by: PODIATRIST

## 2022-06-07 PROCEDURE — 3052F PR MOST RECENT HEMOGLOBIN A1C LEVEL 8.0 - < 9.0%: ICD-10-PCS | Mod: CPTII,,, | Performed by: PODIATRIST

## 2022-06-07 PROCEDURE — 1159F PR MEDICATION LIST DOCUMENTED IN MEDICAL RECORD: ICD-10-PCS | Mod: CPTII,,, | Performed by: PODIATRIST

## 2022-06-07 PROCEDURE — 11045 DBRDMT SUBQ TISS EACH ADDL: CPT | Mod: 59,,, | Performed by: PODIATRIST

## 2022-06-07 PROCEDURE — 11043 PR DEBRIDEMENT, SKIN, SUB-Q TISSUE,MUSCLE,=<20 SQ CM: ICD-10-PCS | Mod: ,,, | Performed by: PODIATRIST

## 2022-06-07 PROCEDURE — 1160F PR REVIEW ALL MEDS BY PRESCRIBER/CLIN PHARMACIST DOCUMENTED: ICD-10-PCS | Mod: CPTII,,, | Performed by: PODIATRIST

## 2022-06-07 PROCEDURE — 87077 CULTURE AEROBIC IDENTIFY: CPT | Mod: TXP | Performed by: PODIATRIST

## 2022-06-07 PROCEDURE — 4010F ACE/ARB THERAPY RXD/TAKEN: CPT | Mod: CPTII,,, | Performed by: PODIATRIST

## 2022-06-07 PROCEDURE — 87186 SC STD MICRODIL/AGAR DIL: CPT | Mod: TXP | Performed by: PODIATRIST

## 2022-06-07 PROCEDURE — 3066F NEPHROPATHY DOC TX: CPT | Mod: CPTII,,, | Performed by: PODIATRIST

## 2022-06-07 PROCEDURE — 87070 CULTURE OTHR SPECIMN AEROBIC: CPT | Mod: TXP | Performed by: PODIATRIST

## 2022-06-07 PROCEDURE — 3078F PR MOST RECENT DIASTOLIC BLOOD PRESSURE < 80 MM HG: ICD-10-PCS | Mod: CPTII,,, | Performed by: PODIATRIST

## 2022-06-07 PROCEDURE — 99214 OFFICE O/P EST MOD 30 MIN: CPT | Mod: 25 | Performed by: PODIATRIST

## 2022-06-07 PROCEDURE — 99214 OFFICE O/P EST MOD 30 MIN: CPT | Mod: 25,,, | Performed by: PODIATRIST

## 2022-06-07 PROCEDURE — 3077F SYST BP >= 140 MM HG: CPT | Mod: CPTII,,, | Performed by: PODIATRIST

## 2022-06-07 PROCEDURE — 3052F HG A1C>EQUAL 8.0%<EQUAL 9.0%: CPT | Mod: CPTII,,, | Performed by: PODIATRIST

## 2022-06-07 PROCEDURE — 11045 DBRDMT SUBQ TISS EACH ADDL: CPT | Mod: 59,NTX | Performed by: PODIATRIST

## 2022-06-07 PROCEDURE — 11045 PR DEB SUBQ TISSUE ADD-ON: ICD-10-PCS | Mod: 59,,, | Performed by: PODIATRIST

## 2022-06-07 PROCEDURE — 1111F DSCHRG MED/CURRENT MED MERGE: CPT | Mod: CPTII,,, | Performed by: PODIATRIST

## 2022-06-07 NOTE — PROGRESS NOTES
[address] [facility] [address]  [place] [address]  Phone: [phone]   [phone]    Patient's PCP:Stefano Lopez MD  Referring Provider: No ref. provider found    Subjective:      Chief Complaint:: Wound Care, Non-healing Wound, Wound Infection, Wound Check, Hospital Follow Up, Foot Swelling, Diabetes, and Diabetic Foot Ulcer    HPI  Leanna García is a 56 y.o. female who presents today with a complaint of left foot ulcers.    Patient Name: Leanna García  MRN: [id number]  Patient Class: IP- Inpatient  Admission Date: 5/4/2022  Hospital Length of Stay: 15 days  Discharge Date and Time:  05/19/2022 7:39 PM  Attending Physician: No att. providers found   Discharging Provider: Usman Stacy MD  Primary Care Provider: Stefano Lopez MD        HPI:   Ms. García is a 56-year-old female with a past medical history of ESRD on HD Monday Wednesday Friday, hypertension, IDDM2, PAF, and chronic cough who presents today with complaints of fever up to 103. It is severe.  It is associated with cough, posttussive vomiting, fatigue, sinus congestion, weakness, and a left foot ulcer.  She denies chest pain, diarrhea, dizziness, loss of consciousness.  In the ED she was noted to have a large left foot ulceration at the plantar surface and on the side of the foot.  CT of abdomen pelvis also reveals possible enteritis and bilateral perinephric fat stranding.  WBCs 21 K, troponin is 0.4 in the setting of ESRD.  She did not go to dialysis today she felt too bad.  Glucose is 446, anion gap 20, bicarb 24.         Procedure(s) (LRB):  INCISION AND DRAINAGE, FOOT (Left)       Hospital Course:   Patient  with Uncontrolled Diabetes mellitus and ESRD admitted with sepsis   Bacillus cereus bacteremia along with L foot abscess   Had enteritis/Food poisoning (ate chinese food before onset of symptoms)  Regarding Foot abscess pt had S/p Incision/drainage/debridement of  Left foot deep tissue abscess below fascia muscle and tendon  She  Again had Incision and drainage below fascia of multiple abscesses left foot on 05/13  Patient was evaluated by ID/Podiatry Teams   Eventually pt was ischarged to SNF for 5 weeks of iv Daptomycin Rx  Long term Prognosis of R foot remains very poor          1. Debridement See Wound Docs for assessment of wounds and procedure notes  2. Continue taking all medications as prescribed   3. Continue dressing changes, Iodosorb to left great toe wound and surrounding wound area.  Wound VAC instill to left plantar and midfoot wounds  4. RTC 2 weeks  5. Counseled patient on increasing protein intake, not getting wound wet, keeping dressing clean dry and intact, following a healthy diet, elevating legs when able, removing pressure from wound    Total time spent for E&M 35  Total time for debridement 35 minutes     I contacted hospitalist, Dr. Guillen, caring for patient at [facility] in addition to contacted Infectious Disease doctor, Dr. Meneses,  regarding patient's care plan.  Discussed that her infection in her foot is severe.  infectious disease doctor will see patient to adjust antibiotics as necessary.  Culture was taken of the deep tissue today in clinic and sent for analysis    Secure chat sent to above doctors on 6/7/2022:  Hi, patient just came to wound clinic to see me.  Her foot looks very severe.  She needs to be on IV antibiotics and likely additional or different antibiotics as the infection is still present.  I am ordering an MRI of the left foot to assess for possible abscess.  She came with the wound VAC but no supplies were given to her so we were unable to put on the wound VAC after her visit.  Please place wound VAC once she arrives today.  I put the orders in for the wound VAC and location.  Iodosorb on the remaining wounds.        I counseled the patient on their conditions, their implications and medical management.     >50% of this > 60 minute visit was spent face to face educating/counseling  the patient    I spent a total of 60 minutes on the day of the visit.This includes face to face time and non-face to face time preparing to see the patient (eg, review of tests), obtaining and/or reviewing separately obtained history, documenting clinical information in the electronic or other health record, independently interpreting results and communicating results to the patient/family/caregiver, or care coordinator.         Counseling/Education:  I provided patient education verbally regarding:   The aspects of diabetes and how it pertains to the feet. I explained the importance of proper diabetic foot care and how it is essential for the health of their feet.    I discussed the importance of knowing their Hemoglobin A1c and that the level needs to be as close to 6 as possible. I discussed the increase complications of high blood sugar including stroke, blindness, heart attack, kidney failure and loss of limb secondary to neuropathy and PVD.     With neuropathy, beware of any breaks in the skin or redness. These areas are not recognized early due to the numbness.    I discussed Diabetes, lower back issues, metabolic disorders, systemic causes, chemotherapy, vitamin deficiency, heavy metal exposure, as some of the causes. I also explained that as much as 40% of the time we can not find a cause. I discussed different treatments available to control the symptoms but which may not cure the problem.       Counseled patient on the aspects of diabetes and how it pertains to the feet.  I explained the importance of proper diabetic foot care and how it is essential for the health of their feet.      Shoe inspection. Patient instructed on proper foot hygeine. We discussed wearing proper shoe gear, daily foot inspections, never walking without protective shoe gear, never putting sharp instruments to feet, routine podiatric nail visits every 2-3 months.        Patient should call the office immediately if any signs of  infection, such as fever, chills, sweats, increased redness or pain.    Patient was instructed to call the clinic or go to the emergency department if their symptoms do not improve, worsens, or if new symptoms develop.  Patient was advised that if any increased swelling, pain, or numbness arise to go immediately to the ED. Patient knows to call any time if an emergency arises. Shared decision making occurred and patient verbalized understanding in agreement with this plan.     Much of the documentation for this visit was completed in the Wound Docs system.  Please see the attached documentation for further details about the patient's care. Scanned under the Media tab.        Alivia Bruno DPM       Vitals:    06/07/22 1727   BP: (!) 167/71   Pulse: 81   Resp: 17   Temp: 99 °F (37.2 °C)   PainSc: 0-No pain      Shoe Size:     Past Surgical History:   Procedure Laterality Date    COLONOSCOPY N/A 10/5/2016    Procedure: COLONOSCOPY;  Surgeon: Pillo Chanel MD;  Location: [facility];  Service: Endoscopy;  Laterality: N/A;    COLONOSCOPY N/A 4/26/2022    Procedure: COLONOSCOPY;  Surgeon: Saravanan Rachel MD;  Location: [facility];  Service: Endoscopy;  Laterality: N/A;    HERNIA REPAIR Bilateral 11/22/2016    inguinal    HYSTERECTOMY      INCISION AND DRAINAGE FOOT Left 5/13/2022    Procedure: INCISION AND DRAINAGE, FOOT;  Surgeon: Ricardo Bruno DPM;  Location: [facility] OR;  Service: Podiatry;  Laterality: Left;    OOPHORECTOMY       Past Medical History:   Diagnosis Date    A-fib     resolved per patient    Arthritis     Bronchitis     Cardiovascular event risk, ASCVD 10-year risk 6.7% 10/15/2016    Diabetes mellitus     Diabetes mellitus type II     Diabetic ulcer of left midfoot 5/5/2022    Disorder of kidney and ureter     Encounter for blood transfusion     ESRD (end stage renal disease) 5/18/2018    MANJINDER (generalized anxiety disorder) 10/25/2016    History of colon polyps 11/02/2016     Hyperlipidemia     Hypertension     Stroke      Family History   Problem Relation Age of Onset    Hyperlipidemia Mother     Hypertension Mother     Hypertension Father     Diabetes Father     Diabetes Sister     Diabetes Sister     Diabetes Maternal Aunt     Diabetes Maternal Grandmother     Heart disease Maternal Grandmother     Cancer Paternal Grandmother     Breast cancer Neg Hx     Colon cancer Neg Hx     Ovarian cancer Neg Hx         Social History:   Marital Status:   Alcohol History:  reports no history of alcohol use.  Tobacco History:  reports that she has never smoked. She has never used smokeless tobacco.  Drug History:  reports no history of drug use.    Review of patient's allergies indicates:   Allergen Reactions    Dilaudid [hydromorphone] Nausea And Vomiting    Lortab [hydrocodone-acetaminophen] Itching    Vancomycin Itching    Chlorhexidine Itching       No current facility-administered medications for this visit.     No current outpatient medications on file.     Facility-Administered Medications Ordered in Other Visits   Medication Dose Route Frequency Provider Last Rate Last Admin    acetaminophen tablet 650 mg  650 mg Oral Q4H PRN Abel Guillen MD   650 mg at 06/07/22 2200    ALPRAZolam tablet 0.5 mg  0.5 mg Oral TID PRN Abel Guillen MD   0.5 mg at 06/05/22 0905    amLODIPine tablet 10 mg  10 mg Oral Daily Abel Guillen MD   10 mg at 06/08/22 0837    ascorbic acid (vitamin C) tablet 500 mg  500 mg Oral Daily Abel Guillen MD   500 mg at 06/08/22 0837    aspirin EC tablet 81 mg  81 mg Oral Daily Abel Guillen MD   81 mg at 06/08/22 0837    azelastine 137 mcg (0.1 %) nasal spray 137 mcg  1 spray Nasal BID Abel Guillen MD   137 mcg at 06/08/22 0837    benzonatate capsule 100 mg  100 mg Oral TID PRN Abel Guillen MD        calcium acetate(phosphat bind) capsule 667 mg  667 mg Oral TID  Abel Guillen MD   667 mg at 06/08/22 1731    cetirizine  tablet 10 mg  10 mg Oral Every other day Abel Guillen MD   10 mg at 06/08/22 0837    DAPTOmycin (CUBICIN) 500 mg in sodium chloride 0.9% IVPB  500 mg Intravenous Q48H Abel Guillen MD   Stopped at 06/07/22 2324    fluticasone propionate 50 mcg/actuation nasal spray 100 mcg  2 spray Each Nostril Daily Abel Guillen MD   100 mcg at 06/08/22 0837    gabapentin capsule 100 mg  100 mg Oral BID Abel Guillen MD   100 mg at 06/08/22 0837    glucose chewable tablet 16 g  16 g Oral PRN Abel Guillen MD        heparin (porcine) injection 5,000 Units  5,000 Units Intravenous PRN Abel Guillen MD        hydrALAZINE tablet 50 mg  50 mg Oral Q12H Godfrey Boone MD   50 mg at 06/08/22 0837    HYDROcodone-acetaminophen 5-325 mg per tablet 1 tablet  1 tablet Oral Q6H PRN Abel Guillen MD   1 tablet at 06/07/22 2110    insulin aspart U-100 pen 1-6 Units  1-6 Units Subcutaneous PRN Abel Guillen MD   1 Units at 06/07/22 2123    insulin aspart U-100 pen 3 Units  3 Units Subcutaneous TIDWM Abel Guillen MD   3 Units at 06/08/22 1733    insulin detemir U-100 pen 15 Units  15 Units Subcutaneous QHS Abel Guillen MD   15 Units at 06/07/22 2127    lactulose 20 gram/30 mL solution Soln 20 g  20 g Oral BID PRN Abel Guillen MD   20 g at 06/05/22 1715    losartan tablet 100 mg  100 mg Oral Daily Abel Guillen MD   100 mg at 06/08/22 0837    multivitamin tablet  1 tablet Oral Daily Abel Guillen MD   1 tablet at 06/08/22 0837    ondansetron disintegrating tablet 8 mg  8 mg Oral Q8H PRN Abel Guillen MD   8 mg at 06/03/22 1636    oxybutynin tablet 5 mg  5 mg Oral TID Godfrey Boone MD   5 mg at 06/08/22 1541    piperacillin-tazobactam 4.5 g in sodium chloride 0.9% 100 mL IVPB (ready to mix system)  4.5 g Intravenous Q12H Helen Meneses MD 25 mL/hr at 06/08/22 1803 4.5 g at 06/08/22 1803    polyethylene glycol packet 17 g  17 g Oral BID PRN Abel Guillen MD        promethazine tablet 25 mg   25 mg Oral Q6H PRN Abel Guillen MD        sodium chloride 0.9% flush 10 mL  10 mL Intravenous PRN Abel Guillen MD           Review of Systems   Constitutional: Negative for chills, fatigue, fever and unexpected weight change.   HENT: Negative for hearing loss and trouble swallowing.    Eyes: Negative for photophobia and visual disturbance.   Respiratory: Negative for cough, shortness of breath and wheezing.    Cardiovascular: Negative for chest pain, palpitations and leg swelling.   Gastrointestinal: Negative for abdominal pain and nausea.   Genitourinary: Negative for dysuria and frequency.   Musculoskeletal: Positive for gait problem. Negative for arthralgias, back pain, joint swelling and myalgias.   Skin: Positive for wound. Negative for rash.   Neurological: Negative for tremors, seizures, weakness, numbness and headaches.   Hematological: Does not bruise/bleed easily.         Objective:        Physical Exam:   Foot Exam  Physical Exam  Ortho/SPM Exam     Imaging:            Assessment:       1. Ulcer of left foot with necrosis of muscle    2. Diabetic ulcer of left midfoot associated with type 2 diabetes mellitus, with necrosis of muscle    3. Type 2 diabetes mellitus with hypoglycemia and coma, with long-term current use of insulin    4. 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    5. History of TIA (transient ischemic attack)    6. Type 2 diabetes mellitus with chronic kidney disease on chronic dialysis, with long-term current use of insulin    7. ESRD (end stage renal disease)    8. Hypertension associated with diabetes    9. Bilateral lower extremity edema    10. Diabetic foot infection    11. At high risk for inadequate nutritional intake    12. Difficulty in walking, not elsewhere classified    13. At risk for readmission to hospital    14. Cellulitis and abscess of foot      Plan:   Ulcer of left foot with necrosis of muscle    Diabetic ulcer of left midfoot associated with type 2  diabetes mellitus, with necrosis of muscle  -     Aerobic culture  -     Culture, Anaerobic    Type 2 diabetes mellitus with hypoglycemia and coma, with long-term current use of insulin    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    History of TIA (transient ischemic attack)    Type 2 diabetes mellitus with chronic kidney disease on chronic dialysis, with long-term current use of insulin    ESRD (end stage renal disease)    Hypertension associated with diabetes    Bilateral lower extremity edema    Diabetic foot infection  -     Aerobic culture  -     Culture, Anaerobic    At high risk for inadequate nutritional intake    Difficulty in walking, not elsewhere classified    At risk for readmission to hospital    Cellulitis and abscess of foot      Follow up in about 2 weeks (around 6/21/2022).    Procedures          Counseling:     I provided patient education verbally regarding:   Patient diagnosis, treatment options, as well as alternatives, risks, and benefits.     This note was created using Dragon voice recognition software that occasionally misinterpreted phrases or words.

## 2022-06-12 LAB — BACTERIA SPEC AEROBE CULT: ABNORMAL

## 2022-06-13 LAB
BACTERIA SPEC ANAEROBE CULT: NORMAL
FUNGUS SPEC CULT: NORMAL

## 2022-06-17 ENCOUNTER — TELEPHONE (OUTPATIENT)
Dept: TRANSPLANT | Facility: CLINIC | Age: 57
End: 2022-06-17
Payer: MEDICAID

## 2022-06-17 NOTE — TELEPHONE ENCOUNTER
Pt calling with questions re: testing for pre-transplant evaluation. SW advised pt to speak to pt's nurse coordinator about scheduling for testing. Pt verbalized understanding. SW explained to pt how to contact nurse coordinator. Pt reports no other questions/concerns at this time. SW remains available.     ----- Message from Adia Zelaya sent at 6/17/2022  9:09 AM CDT -----  Regarding: Questions  Contact: [phone]  Questions    Who Called: Leanna  Questions: The transplant list  Call Back number:[phone]

## 2022-06-20 ENCOUNTER — TELEPHONE (OUTPATIENT)
Dept: TRANSPLANT | Facility: CLINIC | Age: 57
End: 2022-06-20
Payer: MEDICAID

## 2022-06-20 NOTE — TELEPHONE ENCOUNTER
----- Message from Gregoria Rodriguez MA sent at 6/17/2022 11:32 AM CDT -----  Regarding: FW: Patient status    ----- Message -----  From: Edgar Sanchez  Sent: 6/17/2022   9:37 AM CDT  To: [facility] Non-Clinical  Subject: Patient status                                   Patient calling to speak to coordinator regarding letter out. Patient is in nursing facility for rehab for her foot and states she has only one more day of testing. Patient expresses her interest in the transplant and does not want to be taken off of the list.      Call: [phone] (Mobile

## 2022-06-21 ENCOUNTER — OFFICE VISIT (OUTPATIENT)
Dept: WOUND CARE | Facility: HOSPITAL | Age: 57
End: 2022-06-21
Attending: PODIATRIST
Payer: MEDICARE

## 2022-06-21 ENCOUNTER — HOSPITAL ENCOUNTER (INPATIENT)
Facility: HOSPITAL | Age: 57
LOS: 9 days | Discharge: NURSING, REHAB OR LONG-TERM CARE | DRG: 623 | End: 2022-06-30
Attending: EMERGENCY MEDICINE
Payer: MEDICARE

## 2022-06-21 VITALS
RESPIRATION RATE: 20 BRPM | HEART RATE: 76 BPM | SYSTOLIC BLOOD PRESSURE: 154 MMHG | TEMPERATURE: 98 F | DIASTOLIC BLOOD PRESSURE: 84 MMHG

## 2022-06-21 DIAGNOSIS — R26.2 DIFFICULTY IN WALKING, NOT ELSEWHERE CLASSIFIED: ICD-10-CM

## 2022-06-21 DIAGNOSIS — Z79.4 TYPE 2 DIABETES MELLITUS WITH CHRONIC KIDNEY DISEASE ON CHRONIC DIALYSIS, WITH LONG-TERM CURRENT USE OF INSULIN: ICD-10-CM

## 2022-06-21 DIAGNOSIS — L97.423 DIABETIC ULCER OF LEFT MIDFOOT ASSOCIATED WITH TYPE 2 DIABETES MELLITUS, WITH NECROSIS OF MUSCLE: ICD-10-CM

## 2022-06-21 DIAGNOSIS — Z99.2 TYPE 2 DIABETES MELLITUS WITH CHRONIC KIDNEY DISEASE ON CHRONIC DIALYSIS, WITH LONG-TERM CURRENT USE OF INSULIN: ICD-10-CM

## 2022-06-21 DIAGNOSIS — L97.509 DIABETIC FOOT ULCER: Primary | ICD-10-CM

## 2022-06-21 DIAGNOSIS — N18.6 ESRD (END STAGE RENAL DISEASE): ICD-10-CM

## 2022-06-21 DIAGNOSIS — L97.429: ICD-10-CM

## 2022-06-21 DIAGNOSIS — E11.641 TYPE 2 DIABETES MELLITUS WITH HYPOGLYCEMIA AND COMA, WITH LONG-TERM CURRENT USE OF INSULIN: ICD-10-CM

## 2022-06-21 DIAGNOSIS — L97.512 ULCER OF RIGHT FOOT WITH FAT LAYER EXPOSED: ICD-10-CM

## 2022-06-21 DIAGNOSIS — A41.9 SEPSIS, DUE TO UNSPECIFIED ORGANISM, UNSPECIFIED WHETHER ACUTE ORGAN DYSFUNCTION PRESENT: ICD-10-CM

## 2022-06-21 DIAGNOSIS — E11.649 TYPE 2 DIABETES MELLITUS WITH HYPOGLYCEMIA UNAWARENESS: ICD-10-CM

## 2022-06-21 DIAGNOSIS — L08.9 DIABETIC FOOT INFECTION: ICD-10-CM

## 2022-06-21 DIAGNOSIS — E11.22 TYPE 2 DIABETES MELLITUS WITH CHRONIC KIDNEY DISEASE ON CHRONIC DIALYSIS, WITH LONG-TERM CURRENT USE OF INSULIN: ICD-10-CM

## 2022-06-21 DIAGNOSIS — E11.59 HYPERTENSION ASSOCIATED WITH DIABETES: ICD-10-CM

## 2022-06-21 DIAGNOSIS — N18.6 TYPE 2 DIABETES MELLITUS WITH CHRONIC KIDNEY DISEASE ON CHRONIC DIALYSIS, WITH LONG-TERM CURRENT USE OF INSULIN: ICD-10-CM

## 2022-06-21 DIAGNOSIS — R60.0 BILATERAL LOWER EXTREMITY EDEMA: ICD-10-CM

## 2022-06-21 DIAGNOSIS — E78.00 HYPERCHOLESTEREMIA: ICD-10-CM

## 2022-06-21 DIAGNOSIS — I15.2 HYPERTENSION ASSOCIATED WITH DIABETES: ICD-10-CM

## 2022-06-21 DIAGNOSIS — L97.423 DIABETIC ULCER OF LEFT MIDFOOT ASSOCIATED WITH TYPE 2 DIABETES MELLITUS, WITH NECROSIS OF MUSCLE: Primary | ICD-10-CM

## 2022-06-21 DIAGNOSIS — Z89.432 HISTORY OF AMPUTATION OF LEFT FOOT: ICD-10-CM

## 2022-06-21 DIAGNOSIS — E11.621 DIABETIC FOOT ULCER: Primary | ICD-10-CM

## 2022-06-21 DIAGNOSIS — R09.89 DECREASED PEDAL PULSES: ICD-10-CM

## 2022-06-21 DIAGNOSIS — Z86.73 HISTORY OF TIA (TRANSIENT ISCHEMIC ATTACK): ICD-10-CM

## 2022-06-21 DIAGNOSIS — A49.8 INFECTION DUE TO ACINETOBACTER SPECIES: ICD-10-CM

## 2022-06-21 DIAGNOSIS — L02.619 CELLULITIS AND ABSCESS OF FOOT: ICD-10-CM

## 2022-06-21 DIAGNOSIS — A49.02 MRSA (METHICILLIN RESISTANT STAPHYLOCOCCUS AUREUS): ICD-10-CM

## 2022-06-21 DIAGNOSIS — L97.423 HEEL ULCER, LEFT, WITH NECROSIS OF MUSCLE: ICD-10-CM

## 2022-06-21 DIAGNOSIS — I73.9 PERIPHERAL VASCULAR DISEASE: ICD-10-CM

## 2022-06-21 DIAGNOSIS — Z91.89 AT HIGH RISK FOR INADEQUATE NUTRITIONAL INTAKE: ICD-10-CM

## 2022-06-21 DIAGNOSIS — E11.621: ICD-10-CM

## 2022-06-21 DIAGNOSIS — E11.628 DIABETIC FOOT INFECTION: ICD-10-CM

## 2022-06-21 DIAGNOSIS — Z91.89 AT RISK FOR READMISSION TO HOSPITAL: ICD-10-CM

## 2022-06-21 DIAGNOSIS — L97.523 ULCER OF LEFT FOOT WITH NECROSIS OF MUSCLE: ICD-10-CM

## 2022-06-21 DIAGNOSIS — E11.621 DIABETIC ULCER OF LEFT MIDFOOT ASSOCIATED WITH TYPE 2 DIABETES MELLITUS, WITH NECROSIS OF MUSCLE: Primary | ICD-10-CM

## 2022-06-21 DIAGNOSIS — E11.621 DIABETIC ULCER OF LEFT MIDFOOT ASSOCIATED WITH TYPE 2 DIABETES MELLITUS, WITH NECROSIS OF MUSCLE: ICD-10-CM

## 2022-06-21 DIAGNOSIS — Z79.2 ENCOUNTER FOR LONG-TERM (CURRENT) USE OF ANTIBIOTICS: ICD-10-CM

## 2022-06-21 DIAGNOSIS — Z79.4 TYPE 2 DIABETES MELLITUS WITH HYPOGLYCEMIA AND COMA, WITH LONG-TERM CURRENT USE OF INSULIN: ICD-10-CM

## 2022-06-21 DIAGNOSIS — L03.119 CELLULITIS AND ABSCESS OF FOOT: ICD-10-CM

## 2022-06-21 LAB
ALBUMIN SERPL BCP-MCNC: 3.4 G/DL (ref 3.5–5.2)
ALP SERPL-CCNC: 67 U/L (ref 55–135)
ALT SERPL W/O P-5'-P-CCNC: 11 U/L (ref 10–44)
ANION GAP SERPL CALC-SCNC: 11 MMOL/L (ref 8–16)
AST SERPL-CCNC: 15 U/L (ref 10–40)
BACTERIA #/AREA URNS HPF: NEGATIVE /HPF
BASOPHILS # BLD AUTO: 0.07 K/UL (ref 0–0.2)
BASOPHILS NFR BLD: 0.8 % (ref 0–1.9)
BILIRUB SERPL-MCNC: 0.4 MG/DL (ref 0.1–1)
BILIRUB UR QL STRIP: NEGATIVE
BUN SERPL-MCNC: 44 MG/DL (ref 6–20)
CALCIUM SERPL-MCNC: 10 MG/DL (ref 8.7–10.5)
CHLORIDE SERPL-SCNC: 92 MMOL/L (ref 95–110)
CLARITY UR: CLEAR
CO2 SERPL-SCNC: 31 MMOL/L (ref 23–29)
COLOR UR: YELLOW
CREAT SERPL-MCNC: 5.8 MG/DL (ref 0.5–1.4)
DIFFERENTIAL METHOD: ABNORMAL
EOSINOPHIL # BLD AUTO: 0.4 K/UL (ref 0–0.5)
EOSINOPHIL NFR BLD: 4.3 % (ref 0–8)
ERYTHROCYTE [DISTWIDTH] IN BLOOD BY AUTOMATED COUNT: 16.8 % (ref 11.5–14.5)
EST. GFR  (AFRICAN AMERICAN): 8.7 ML/MIN/1.73 M^2
EST. GFR  (NON AFRICAN AMERICAN): 7.5 ML/MIN/1.73 M^2
GLUCOSE SERPL-MCNC: 129 MG/DL (ref 70–110)
GLUCOSE SERPL-MCNC: 202 MG/DL (ref 70–110)
GLUCOSE UR QL STRIP: ABNORMAL
HCT VFR BLD AUTO: 36.2 % (ref 37–48.5)
HGB BLD-MCNC: 10.7 G/DL (ref 12–16)
HGB UR QL STRIP: NEGATIVE
HYALINE CASTS #/AREA URNS LPF: 12 /LPF
IMM GRANULOCYTES # BLD AUTO: 0.05 K/UL (ref 0–0.04)
IMM GRANULOCYTES NFR BLD AUTO: 0.6 % (ref 0–0.5)
KETONES UR QL STRIP: NEGATIVE
LACTATE SERPL-SCNC: 1 MMOL/L (ref 0.5–1.9)
LEUKOCYTE ESTERASE UR QL STRIP: NEGATIVE
LYMPHOCYTES # BLD AUTO: 1.6 K/UL (ref 1–4.8)
LYMPHOCYTES NFR BLD: 17.9 % (ref 18–48)
MAGNESIUM SERPL-MCNC: 2.5 MG/DL (ref 1.6–2.6)
MCH RBC QN AUTO: 25.8 PG (ref 27–31)
MCHC RBC AUTO-ENTMCNC: 29.6 G/DL (ref 32–36)
MCV RBC AUTO: 87 FL (ref 82–98)
MICROSCOPIC COMMENT: ABNORMAL
MONOCYTES # BLD AUTO: 0.9 K/UL (ref 0.3–1)
MONOCYTES NFR BLD: 9.6 % (ref 4–15)
NEUTROPHILS # BLD AUTO: 6 K/UL (ref 1.8–7.7)
NEUTROPHILS NFR BLD: 66.8 % (ref 38–73)
NITRITE UR QL STRIP: NEGATIVE
NRBC BLD-RTO: 0 /100 WBC
PH UR STRIP: >8 [PH] (ref 5–8)
PLATELET # BLD AUTO: 340 K/UL (ref 150–450)
PMV BLD AUTO: 9.5 FL (ref 9.2–12.9)
POTASSIUM SERPL-SCNC: 4.6 MMOL/L (ref 3.5–5.1)
PROT SERPL-MCNC: 8.1 G/DL (ref 6–8.4)
PROT UR QL STRIP: ABNORMAL
RBC # BLD AUTO: 4.14 M/UL (ref 4–5.4)
RBC #/AREA URNS HPF: 1 /HPF (ref 0–4)
SARS-COV-2 RDRP RESP QL NAA+PROBE: NEGATIVE
SODIUM SERPL-SCNC: 134 MMOL/L (ref 136–145)
SP GR UR STRIP: 1.01 (ref 1–1.03)
SQUAMOUS #/AREA URNS HPF: 4 /HPF
URN SPEC COLLECT METH UR: ABNORMAL
UROBILINOGEN UR STRIP-ACNC: NEGATIVE EU/DL
WBC # BLD AUTO: 8.92 K/UL (ref 3.9–12.7)
WBC #/AREA URNS HPF: 2 /HPF (ref 0–5)

## 2022-06-21 PROCEDURE — 83735 ASSAY OF MAGNESIUM: CPT | Performed by: EMERGENCY MEDICINE

## 2022-06-21 PROCEDURE — 85025 COMPLETE CBC W/AUTO DIFF WBC: CPT | Performed by: EMERGENCY MEDICINE

## 2022-06-21 PROCEDURE — 1160F PR REVIEW ALL MEDS BY PRESCRIBER/CLIN PHARMACIST DOCUMENTED: ICD-10-PCS | Mod: CPTII,,, | Performed by: PODIATRIST

## 2022-06-21 PROCEDURE — 1159F PR MEDICATION LIST DOCUMENTED IN MEDICAL RECORD: ICD-10-PCS | Mod: CPTII,,, | Performed by: PODIATRIST

## 2022-06-21 PROCEDURE — 4010F ACE/ARB THERAPY RXD/TAKEN: CPT | Mod: CPTII,,, | Performed by: PODIATRIST

## 2022-06-21 PROCEDURE — 63600175 PHARM REV CODE 636 W HCPCS: Performed by: STUDENT IN AN ORGANIZED HEALTH CARE EDUCATION/TRAINING PROGRAM

## 2022-06-21 PROCEDURE — 3052F HG A1C>EQUAL 8.0%<EQUAL 9.0%: CPT | Mod: CPTII,,, | Performed by: PODIATRIST

## 2022-06-21 PROCEDURE — 1159F MED LIST DOCD IN RCRD: CPT | Mod: CPTII,,, | Performed by: PODIATRIST

## 2022-06-21 PROCEDURE — 3077F SYST BP >= 140 MM HG: CPT | Mod: CPTII,,, | Performed by: PODIATRIST

## 2022-06-21 PROCEDURE — 99214 OFFICE O/P EST MOD 30 MIN: CPT | Mod: 25,,, | Performed by: PODIATRIST

## 2022-06-21 PROCEDURE — 3052F PR MOST RECENT HEMOGLOBIN A1C LEVEL 8.0 - < 9.0%: ICD-10-PCS | Mod: CPTII,,, | Performed by: PODIATRIST

## 2022-06-21 PROCEDURE — C9399 UNCLASSIFIED DRUGS OR BIOLOG: HCPCS | Performed by: STUDENT IN AN ORGANIZED HEALTH CARE EDUCATION/TRAINING PROGRAM

## 2022-06-21 PROCEDURE — 11046 DBRDMT MUSC&/FSCA EA ADDL: CPT | Mod: ,,, | Performed by: PODIATRIST

## 2022-06-21 PROCEDURE — 87040 BLOOD CULTURE FOR BACTERIA: CPT | Performed by: EMERGENCY MEDICINE

## 2022-06-21 PROCEDURE — 12000002 HC ACUTE/MED SURGE SEMI-PRIVATE ROOM

## 2022-06-21 PROCEDURE — 81001 URINALYSIS AUTO W/SCOPE: CPT | Performed by: EMERGENCY MEDICINE

## 2022-06-21 PROCEDURE — U0002 COVID-19 LAB TEST NON-CDC: HCPCS | Performed by: STUDENT IN AN ORGANIZED HEALTH CARE EDUCATION/TRAINING PROGRAM

## 2022-06-21 PROCEDURE — 3079F DIAST BP 80-89 MM HG: CPT | Mod: CPTII,,, | Performed by: PODIATRIST

## 2022-06-21 PROCEDURE — 99285 EMERGENCY DEPT VISIT HI MDM: CPT

## 2022-06-21 PROCEDURE — 82962 GLUCOSE BLOOD TEST: CPT

## 2022-06-21 PROCEDURE — 80053 COMPREHEN METABOLIC PANEL: CPT | Performed by: EMERGENCY MEDICINE

## 2022-06-21 PROCEDURE — 83605 ASSAY OF LACTIC ACID: CPT | Performed by: EMERGENCY MEDICINE

## 2022-06-21 PROCEDURE — 1111F DSCHRG MED/CURRENT MED MERGE: CPT | Mod: CPTII,,, | Performed by: PODIATRIST

## 2022-06-21 PROCEDURE — 3066F NEPHROPATHY DOC TX: CPT | Mod: CPTII,,, | Performed by: PODIATRIST

## 2022-06-21 PROCEDURE — 25000003 PHARM REV CODE 250: Performed by: INTERNAL MEDICINE

## 2022-06-21 PROCEDURE — 3066F PR DOCUMENTATION OF TREATMENT FOR NEPHROPATHY: ICD-10-PCS | Mod: CPTII,,, | Performed by: PODIATRIST

## 2022-06-21 PROCEDURE — 3077F PR MOST RECENT SYSTOLIC BLOOD PRESSURE >= 140 MM HG: ICD-10-PCS | Mod: CPTII,,, | Performed by: PODIATRIST

## 2022-06-21 PROCEDURE — 11046 PR DEB MUSC/FASCIA ADD-ON: ICD-10-PCS | Mod: ,,, | Performed by: PODIATRIST

## 2022-06-21 PROCEDURE — 1160F RVW MEDS BY RX/DR IN RCRD: CPT | Mod: CPTII,,, | Performed by: PODIATRIST

## 2022-06-21 PROCEDURE — 63600175 PHARM REV CODE 636 W HCPCS: Performed by: INTERNAL MEDICINE

## 2022-06-21 PROCEDURE — 25000003 PHARM REV CODE 250: Performed by: STUDENT IN AN ORGANIZED HEALTH CARE EDUCATION/TRAINING PROGRAM

## 2022-06-21 PROCEDURE — 11043 PR DEBRIDEMENT, SKIN, SUB-Q TISSUE,MUSCLE,=<20 SQ CM: ICD-10-PCS | Mod: ,,, | Performed by: PODIATRIST

## 2022-06-21 PROCEDURE — 1111F PR DISCHARGE MEDS RECONCILED W/ CURRENT OUTPATIENT MED LIST: ICD-10-PCS | Mod: CPTII,,, | Performed by: PODIATRIST

## 2022-06-21 PROCEDURE — 3079F PR MOST RECENT DIASTOLIC BLOOD PRESSURE 80-89 MM HG: ICD-10-PCS | Mod: CPTII,,, | Performed by: PODIATRIST

## 2022-06-21 PROCEDURE — 99214 PR OFFICE/OUTPT VISIT, EST, LEVL IV, 30-39 MIN: ICD-10-PCS | Mod: 25,,, | Performed by: PODIATRIST

## 2022-06-21 PROCEDURE — 11046 DBRDMT MUSC&/FSCA EA ADDL: CPT | Performed by: PODIATRIST

## 2022-06-21 PROCEDURE — 11043 DBRDMT MUSC&/FSCA 1ST 20/<: CPT | Performed by: PODIATRIST

## 2022-06-21 PROCEDURE — 4010F PR ACE/ARB THEARPY RXD/TAKEN: ICD-10-PCS | Mod: CPTII,,, | Performed by: PODIATRIST

## 2022-06-21 PROCEDURE — 11043 DBRDMT MUSC&/FSCA 1ST 20/<: CPT | Mod: ,,, | Performed by: PODIATRIST

## 2022-06-21 RX ORDER — IBUPROFEN 400 MG/1
400 TABLET ORAL EVERY 6 HOURS PRN
Status: DISCONTINUED | OUTPATIENT
Start: 2022-06-21 | End: 2022-06-30 | Stop reason: HOSPADM

## 2022-06-21 RX ORDER — SODIUM CHLORIDE 0.9 % (FLUSH) 0.9 %
10 SYRINGE (ML) INJECTION EVERY 12 HOURS PRN
Status: DISCONTINUED | OUTPATIENT
Start: 2022-06-21 | End: 2022-06-30 | Stop reason: HOSPADM

## 2022-06-21 RX ORDER — AMLODIPINE BESYLATE 5 MG/1
10 TABLET ORAL DAILY
Status: DISCONTINUED | OUTPATIENT
Start: 2022-06-22 | End: 2022-06-30 | Stop reason: HOSPADM

## 2022-06-21 RX ORDER — IBUPROFEN 200 MG
24 TABLET ORAL
Status: DISCONTINUED | OUTPATIENT
Start: 2022-06-21 | End: 2022-06-30 | Stop reason: HOSPADM

## 2022-06-21 RX ORDER — LACTULOSE 10 G/15ML
20 SOLUTION ORAL 2 TIMES DAILY
Status: DISCONTINUED | OUTPATIENT
Start: 2022-06-21 | End: 2022-06-30 | Stop reason: HOSPADM

## 2022-06-21 RX ORDER — ENOXAPARIN SODIUM 100 MG/ML
30 INJECTION SUBCUTANEOUS EVERY 24 HOURS
Status: DISCONTINUED | OUTPATIENT
Start: 2022-06-21 | End: 2022-06-30 | Stop reason: HOSPADM

## 2022-06-21 RX ORDER — ATORVASTATIN CALCIUM 40 MG/1
80 TABLET, FILM COATED ORAL NIGHTLY
Status: DISCONTINUED | OUTPATIENT
Start: 2022-06-21 | End: 2022-06-30 | Stop reason: HOSPADM

## 2022-06-21 RX ORDER — ONDANSETRON 2 MG/ML
4 INJECTION INTRAMUSCULAR; INTRAVENOUS EVERY 8 HOURS PRN
Status: DISCONTINUED | OUTPATIENT
Start: 2022-06-21 | End: 2022-06-30 | Stop reason: HOSPADM

## 2022-06-21 RX ORDER — IBUPROFEN 200 MG
16 TABLET ORAL
Status: DISCONTINUED | OUTPATIENT
Start: 2022-06-21 | End: 2022-06-30 | Stop reason: HOSPADM

## 2022-06-21 RX ORDER — LOSARTAN POTASSIUM 50 MG/1
100 TABLET ORAL DAILY
Status: DISCONTINUED | OUTPATIENT
Start: 2022-06-22 | End: 2022-06-30 | Stop reason: HOSPADM

## 2022-06-21 RX ORDER — INSULIN ASPART 100 [IU]/ML
1-10 INJECTION, SOLUTION INTRAVENOUS; SUBCUTANEOUS
Status: DISCONTINUED | OUTPATIENT
Start: 2022-06-21 | End: 2022-06-30 | Stop reason: HOSPADM

## 2022-06-21 RX ORDER — CIPROFLOXACIN 500 MG/1
500 TABLET ORAL DAILY
Status: DISCONTINUED | OUTPATIENT
Start: 2022-06-22 | End: 2022-06-27

## 2022-06-21 RX ORDER — GLUCAGON 1 MG
1 KIT INJECTION
Status: DISCONTINUED | OUTPATIENT
Start: 2022-06-21 | End: 2022-06-30 | Stop reason: HOSPADM

## 2022-06-21 RX ORDER — ASPIRIN 81 MG/1
81 TABLET ORAL DAILY
Status: DISCONTINUED | OUTPATIENT
Start: 2022-06-22 | End: 2022-06-30 | Stop reason: HOSPADM

## 2022-06-21 RX ORDER — MORPHINE SULFATE 2 MG/ML
2 INJECTION, SOLUTION INTRAMUSCULAR; INTRAVENOUS EVERY 4 HOURS PRN
Status: DISCONTINUED | OUTPATIENT
Start: 2022-06-21 | End: 2022-06-30 | Stop reason: HOSPADM

## 2022-06-21 RX ORDER — HYDRALAZINE HYDROCHLORIDE 20 MG/ML
10 INJECTION INTRAMUSCULAR; INTRAVENOUS EVERY 4 HOURS PRN
Status: DISCONTINUED | OUTPATIENT
Start: 2022-06-21 | End: 2022-06-30 | Stop reason: HOSPADM

## 2022-06-21 RX ORDER — GABAPENTIN 100 MG/1
100 CAPSULE ORAL 2 TIMES DAILY
Status: DISCONTINUED | OUTPATIENT
Start: 2022-06-21 | End: 2022-06-30 | Stop reason: HOSPADM

## 2022-06-21 RX ORDER — HYDRALAZINE HYDROCHLORIDE 25 MG/1
50 TABLET, FILM COATED ORAL EVERY 12 HOURS
Status: DISCONTINUED | OUTPATIENT
Start: 2022-06-21 | End: 2022-06-30 | Stop reason: HOSPADM

## 2022-06-21 RX ORDER — LACTULOSE 10 G/15ML
20 SOLUTION ORAL; RECTAL 2 TIMES DAILY
COMMUNITY
Start: 2022-06-10 | End: 2022-11-08

## 2022-06-21 RX ORDER — AZELASTINE 1 MG/ML
1 SPRAY, METERED NASAL 2 TIMES DAILY
COMMUNITY
Start: 2022-06-10 | End: 2022-08-12 | Stop reason: SDUPTHER

## 2022-06-21 RX ADMIN — HYDRALAZINE HYDROCHLORIDE 10 MG: 20 INJECTION INTRAMUSCULAR; INTRAVENOUS at 04:06

## 2022-06-21 RX ADMIN — ATORVASTATIN CALCIUM 80 MG: 40 TABLET, FILM COATED ORAL at 09:06

## 2022-06-21 RX ADMIN — INSULIN ASPART 2 UNITS: 100 INJECTION, SOLUTION INTRAVENOUS; SUBCUTANEOUS at 09:06

## 2022-06-21 RX ADMIN — INSULIN DETEMIR 15 UNITS: 100 INJECTION, SOLUTION SUBCUTANEOUS at 09:06

## 2022-06-21 RX ADMIN — HYDRALAZINE HYDROCHLORIDE 50 MG: 25 TABLET ORAL at 09:06

## 2022-06-21 RX ADMIN — LACTULOSE 20 G: 20 SOLUTION ORAL at 09:06

## 2022-06-21 RX ADMIN — MORPHINE SULFATE 2 MG: 2 INJECTION, SOLUTION INTRAMUSCULAR; INTRAVENOUS at 10:06

## 2022-06-21 RX ADMIN — GABAPENTIN 100 MG: 100 CAPSULE ORAL at 09:06

## 2022-06-21 RX ADMIN — DAPTOMYCIN 500 MG: 500 INJECTION, POWDER, LYOPHILIZED, FOR SOLUTION INTRAVENOUS at 08:06

## 2022-06-21 RX ADMIN — AMPICILLIN SODIUM AND SULBACTAM SODIUM 3 G: 2; 1 INJECTION, POWDER, FOR SOLUTION INTRAMUSCULAR; INTRAVENOUS at 10:06

## 2022-06-21 NOTE — Clinical Note
An angiography was performed of the ostial right common femoral artery via hand injection with 10 mL of contrast

## 2022-06-21 NOTE — Clinical Note
90 ml of contrast were injected throughout the case. 10 mL of contrast was the total wasted during the case. 100 mL was the total amount used during the case.

## 2022-06-21 NOTE — ED PROVIDER NOTES
Encounter Date: 6/21/2022       History     Chief Complaint   Patient presents with    Foot Injury     Pt sent by MD for evaluation of her left foot.     Patient sent emergency department by Dr. Bruno for evaluation of worsening diabetic foot ulcer patient was admitted recently transfered to extended care facility than nursing home she has continued treatment with Dr. Bruno at his office today she expressed concerns over the worsening ulceration concerns for decreased blood flow to the limbs which is hindering her healing she denies any current fever she does have pain to the heel area of the foot she has a deep ulcer to the midfoot she is a dialysis patient as well attends dialysis on Monday Wednesday Friday        Review of patient's allergies indicates:   Allergen Reactions    Dilaudid [hydromorphone] Nausea And Vomiting    Vancomycin Itching    Chlorhexidine Itching    Lortab [hydrocodone-acetaminophen] Itching     Past Medical History:   Diagnosis Date    A-fib     resolved per patient    Arthritis     Bronchitis     Cardiovascular event risk, ASCVD 10-year risk 6.7% 10/15/2016    Diabetes mellitus     Diabetes mellitus type II     Diabetic ulcer of left midfoot 5/5/2022    Disorder of kidney and ureter     Encounter for blood transfusion     ESRD (end stage renal disease) 5/18/2018    MANJINDER (generalized anxiety disorder) 10/25/2016    History of colon polyps 11/02/2016    Hyperlipidemia     Hypertension     Stroke      Past Surgical History:   Procedure Laterality Date    COLONOSCOPY N/A 10/5/2016    Procedure: COLONOSCOPY;  Surgeon: Pillo Chanel MD;  Location: [facility];  Service: Endoscopy;  Laterality: N/A;    COLONOSCOPY N/A 4/26/2022    Procedure: COLONOSCOPY;  Surgeon: Saravanan Rachel MD;  Location: [facility];  Service: Endoscopy;  Laterality: N/A;    HERNIA REPAIR Bilateral 11/22/2016    inguinal    HYSTERECTOMY      INCISION AND DRAINAGE FOOT Left 5/13/2022    Procedure:  INCISION AND DRAINAGE, FOOT;  Surgeon: Ricardo Bruno DPM;  Location: [facility] OR;  Service: Podiatry;  Laterality: Left;    OOPHORECTOMY       Family History   Problem Relation Age of Onset    Hyperlipidemia Mother     Hypertension Mother     Hypertension Father     Diabetes Father     Diabetes Sister     Diabetes Sister     Diabetes Maternal Aunt     Diabetes Maternal Grandmother     Heart disease Maternal Grandmother     Cancer Paternal Grandmother     Breast cancer Neg Hx     Colon cancer Neg Hx     Ovarian cancer Neg Hx      Social History     Tobacco Use    Smoking status: Never Smoker    Smokeless tobacco: Never Used   Substance Use Topics    Alcohol use: No    Drug use: No     Review of Systems   Constitutional: Negative for chills and fever.   Cardiovascular: Negative for leg swelling.   Skin: Positive for wound.   All other systems reviewed and are negative.      Physical Exam     Initial Vitals [06/21/22 1012]   BP Pulse Resp Temp SpO2   (!) 151/65 73 18 98.4 °F (36.9 °C) 97 %      MAP       --         Physical Exam    Constitutional: She appears well-developed and well-nourished. No distress.   HENT:   Head: Normocephalic and atraumatic.   Right Ear: External ear normal.   Left Ear: External ear normal.   Mouth/Throat: Oropharynx is clear and moist.   Eyes: Conjunctivae and EOM are normal. Pupils are equal, round, and reactive to light.   Neck: Neck supple.   Normal range of motion.  Cardiovascular: Normal rate, regular rhythm and normal heart sounds.   Bilateral radial pulses 2+ distal posterior tibial pulses are absent even with ultrasound there are monophasic pulses to by bilateral dorsalis pedis   Pulmonary/Chest: Breath sounds normal. No respiratory distress.   Abdominal: Abdomen is soft. Bowel sounds are normal. There is no abdominal tenderness.   Musculoskeletal:         General: Tenderness present. No edema. Normal range of motion.      Cervical back: Normal range of motion and neck  supple.     Neurological: She is alert and oriented to person, place, and time. GCS score is 15. GCS eye subscore is 4. GCS verbal subscore is 5. GCS motor subscore is 6.   Skin: Skin is warm and dry. Capillary refill takes less than 2 seconds. No rash noted.   Ulceration noted to the on the medial aspect of the left foot the left great toe there is gangrenous changes noted to the left heel there are 2 separate ulcers on the dorsum of the right 1st and 2nd toe without significant surrounding erythema   Psychiatric: She has a normal mood and affect. Her behavior is normal.         ED Course   Procedures  Labs Reviewed   CBC W/ AUTO DIFFERENTIAL - Abnormal; Notable for the following components:       Result Value    Hemoglobin 10.7 (*)     Hematocrit 36.2 (*)     MCH 25.8 (*)     MCHC 29.6 (*)     RDW 16.8 (*)     Immature Granulocytes 0.6 (*)     Immature Grans (Abs) 0.05 (*)     Lymph % 17.9 (*)     All other components within normal limits   COMPREHENSIVE METABOLIC PANEL - Abnormal; Notable for the following components:    Sodium 134 (*)     Chloride 92 (*)     CO2 31 (*)     Glucose 129 (*)     BUN 44 (*)     Creatinine 5.8 (*)     Albumin 3.4 (*)     eGFR if  8.7 (*)     eGFR if non  7.5 (*)     All other components within normal limits   CULTURE, BLOOD   CULTURE, BLOOD   MAGNESIUM   LACTIC ACID, PLASMA   URINALYSIS, REFLEX TO URINE CULTURE          Imaging Results          X-Ray Foot Complete Bilateral (Final result)  Result time 06/21/22 11:30:31    Final result by Kiel Garvey MD (06/21/22 11:30:31)                 Narrative:    Reason: Diabetic ulcers.    FINDINGS:    Multiple views of the bilateral feet.    LEFT FOOT:    No acute fracture or dislocation observed. Amputation and osteotomy of the fourth digit noted. Pes planus observed. There are moderate degenerative changes of the midfoot bones. There is osteophytosis of the talar neck. No destructive osseous lesion or  periosteal reaction observed. Ulcer of the plantar soft tissues of the foot noted. Marked atherosclerosis observed. Small calcaneal spur.    RIGHT FOOT:    No acute fracture or dislocation. No destructive osseous lesion. There are moderate degenerative changes of the mid foot. Calcaneocuboid degeneration observed. Pes planus noted. Calcaneal spur and Achilles enthesopathy noted. Marked atherosclerosis observed.    IMPRESSION:    1.  Bilateral degenerative changes and pes planus as described.  2.  No destructive osseous lesions observed.  3.  Ulcer in the plantar soft tissues of the left foot.  4.  Marked bilateral atherosclerosis.  5.  Amputation an osteotomy of the left fourth digit.    Electronically signed by:  Kiel Garvey DO  6/21/2022 11:30 AM CDT Workstation: [id number]                               Medications - No data to display  Medical Decision Making:   ED Management:  Patient has extensive bilateral diabetic foot ulcers left greater than right she does have monophasic dorsalis pedis pulses no will likely need further evaluation B although she is a dialysis patient said will time her angiography will discussed patient's findings with the hospitalist for evaluation in the ER                      Clinical Impression:   Final diagnoses:  [E11.621, L97.509] Diabetic foot ulcer                 Zachary De Los Santos MD  06/21/22 9410

## 2022-06-21 NOTE — Clinical Note
An angiography was performed of the ostial suprarenal abdominal aorta  via hand injection with 10 mL of contrast

## 2022-06-21 NOTE — PROGRESS NOTES
[address] [facility] [address]  [place] [address]  Phone: [phone]   [phone]    Patient's PCP:Stefano Lopez MD  Referring Provider: No ref. provider found    Subjective:      Chief Complaint:: Foot Ulcer, Wound Infection, Diabetes, Diabetic Foot Ulcer, Non-healing Wound, Pressure Ulcer, Wound Care, Wound Check, Hospital Follow Up (Discharged from SNF in [place]), and Results (MRI results/)    HPI    ** I am recommending patient go to the emergency room following visit.  She will need to be admitted for IV antibiotics, a vascular surgery consult, Infectious Disease consult, podiatry consult.  I explained to patient and patient's  that at this point I do not think patient's foot is salvageable.  Last visit I recommended a below-knee amputation but patient and patient's  wanted to wait on doing any amputation.  Today I discussed with patient and patient's  again that due to the severity of all of her wounds on her left foot, with no improvement,  Significant amount of tissue loss from the infection, in my opinion her foot is not salvageable.  I discussed with patient and patient's  below-knee amputation in detail.  They still are against amputation.**         Presents for follow-up of left foot diabetic infection.  Patient has significant worsening once again of her wounds.  I was concerned at last visit of the severity of patient's wounds. Last visit   I contacted hospitalist, Dr. Guillen, caring for patient at [facility] in addition to contacted Infectious Disease doctor, Dr. Meneses,  regarding patient's care plan.  Discussed that her infection in her foot is severe.  infectious disease doctor will see patient to adjust antibiotics as necessary.  Culture was taken of the deep tissue today in clinic and sent for analysis.  Patient was then discharged from skilled nursing facility on 06/14.  Please see below for discharge summary.        Patient Name: Leanna Cota  Raquel  MRN: [id number]  Patient Class: IP- SNF  Admission Date: 5/19/2022  Hospital Length of Stay: 26 days  Discharge Date and Time: 6/14/2022  4:00 PM  Attending Physician: No att. providers found   Discharging Provider: Abel Guillen MD  Primary Care Provider: Stefano Lopez MD        HPI:   This is a 57 y/o woman with DM, ESRD, chronic anemia who presents here for continued treatment of left foot DM wound. Patient presented to an outlying facility with worsening foot wound. Patient was placed on broad spectrum antibiotics and closely monitored. Wound care and podiatry followed. Patient was found to have abscess. Patient underwent drainage. Podiatry followed. Patient eventually transitioned to TCU for continued antibiotics, close physician monitoring as well as management of chronic issues including DM, ESRD, anemia.         * No surgery found *       Hospital Course:   Patient admitted TCU for continued wound care, antibiotics, and therapy. Infectious disease consulted. PT and OT consulted. Wound care consulted. Nephrology consulted as patient with ESRD. Patient did develop some increased pain in affected leg and low dose gabapentin ordered with improvement in pain. Patient did develop some fevers and antibiotics broadened. Evaluation by podiatry showed slight worsening of wound and MRI ordered. No obvious abscess noted. With gram negative coverage wound improved. Patient wound eventually grew out acinetobacter. Patient continued to show great improvement with wound and eventually transitioned to [facility] care for continued oral and IV antibiotics and given close follow up.            1. Debridement See Wound Docs for assessment of wounds and procedure notes  2. Continue taking all medications as prescribed   3. Continue dressing changes  4. RTC 1 week  5. Counseled patient on increasing protein intake, not getting wound wet, keeping dressing clean dry and intact, following a healthy diet, elevating legs when  able, removing pressure from wound     Total time spent for E&M 35  Total time for debridement 35 minutes           Secure chat sent to above doctors on 6/7/2022:  Hi, patient just came to wound clinic to see me.  Her foot looks very severe.  She needs to be on IV antibiotics and likely additional or different antibiotics as the infection is still present.  I am ordering an MRI of the left foot to assess for possible abscess.  She came with the wound VAC but no supplies were given to her so we were unable to put on the wound VAC after her visit.  Please place wound VAC once she arrives today.  I put the orders in for the wound VAC and location.  Iodosorb on the remaining wounds.           I counseled the patient on their conditions, their implications and medical management.     >50% of this > 60 minute visit was spent face to face educating/counseling the patient     I spent a total of 60 minutes on the day of the visit.This includes face to face time and non-face to face time preparing to see the patient (eg, review of tests), obtaining and/or reviewing separately obtained history, documenting clinical information in the electronic or other health record, independently interpreting results and communicating results to the patient/family/caregiver, or care coordinator.           Counseling/Education:  I provided patient education verbally regarding:   The aspects of diabetes and how it pertains to the feet. I explained the importance of proper diabetic foot care and how it is essential for the health of their feet.     I discussed the importance of knowing their Hemoglobin A1c and that the level needs to be as close to 6 as possible. I discussed the increase complications of high blood sugar including stroke, blindness, heart attack, kidney failure and loss of limb secondary to neuropathy and PVD.      With neuropathy, beware of any breaks in the skin or redness. These areas are not recognized early due to the  numbness.     I discussed Diabetes, lower back issues, metabolic disorders, systemic causes, chemotherapy, vitamin deficiency, heavy metal exposure, as some of the causes. I also explained that as much as 40% of the time we can not find a cause. I discussed different treatments available to control the symptoms but which may not cure the problem.         Counseled patient on the aspects of diabetes and how it pertains to the feet.  I explained the importance of proper diabetic foot care and how it is essential for the health of their feet.        Shoe inspection. Patient instructed on proper foot hygeine. We discussed wearing proper shoe gear, daily foot inspections, never walking without protective shoe gear, never putting sharp instruments to feet, routine podiatric nail visits every 2-3 months.          Patient should call the office immediately if any signs of infection, such as fever, chills, sweats, increased redness or pain.     Patient was instructed to call the clinic or go to the emergency department if their symptoms do not improve, worsens, or if new symptoms develop.  Patient was advised that if any increased swelling, pain, or numbness arise to go immediately to the ED. Patient knows to call any time if an emergency arises. Shared decision making occurred and patient verbalized understanding in agreement with this plan.      Much of the documentation for this visit was completed in the Wound Docs system.  Please see the attached documentation for further details about the patient's care. Scanned under the Media tab.         Alivia Bruno DPM          Vitals:    06/21/22 0957   BP: (!) 154/84   Pulse: 76   Resp: 20   Temp: 98.4 °F (36.9 °C)   PainSc:   4      Shoe Size:     Past Surgical History:   Procedure Laterality Date    COLONOSCOPY N/A 10/5/2016    Procedure: COLONOSCOPY;  Surgeon: Pillo Chanel MD;  [facility];  Service: Endoscopy;  Laterality: N/A;    COLONOSCOPY N/A 4/26/2022     Procedure: COLONOSCOPY;  Surgeon: Saravanan Rachel MD;  Location: [facility];  Service: Endoscopy;  Laterality: N/A;    HERNIA REPAIR Bilateral 11/22/2016    inguinal    HYSTERECTOMY      INCISION AND DRAINAGE FOOT Left 5/13/2022    Procedure: INCISION AND DRAINAGE, FOOT;  Surgeon: Ricardo Bruno DPM;  Location: [facility] OR;  Service: Podiatry;  Laterality: Left;    OOPHORECTOMY       Past Medical History:   Diagnosis Date    A-fib     resolved per patient    Arthritis     Bronchitis     Cardiovascular event risk, ASCVD 10-year risk 6.7% 10/15/2016    Diabetes mellitus     Diabetes mellitus type II     Diabetic ulcer of left midfoot 5/5/2022    Disorder of kidney and ureter     Encounter for blood transfusion     ESRD (end stage renal disease) 5/18/2018    MANJINDER (generalized anxiety disorder) 10/25/2016    History of colon polyps 11/02/2016    Hyperlipidemia     Hypertension     Stroke      Family History   Problem Relation Age of Onset    Hyperlipidemia Mother     Hypertension Mother     Hypertension Father     Diabetes Father     Diabetes Sister     Diabetes Sister     Diabetes Maternal Aunt     Diabetes Maternal Grandmother     Heart disease Maternal Grandmother     Cancer Paternal Grandmother     Breast cancer Neg Hx     Colon cancer Neg Hx     Ovarian cancer Neg Hx         Social History:   Marital Status:   Alcohol History:  reports no history of alcohol use.  Tobacco History:  reports that she has never smoked. She has never used smokeless tobacco.  Drug History:  reports no history of drug use.    Review of patient's allergies indicates:   Allergen Reactions    Dilaudid [hydromorphone] Nausea And Vomiting    Vancomycin Itching    Chlorhexidine Itching    Lortab [hydrocodone-acetaminophen] Itching       Current Outpatient Medications   Medication Sig Dispense Refill    amLODIPine (NORVASC) 10 MG tablet Take 1 tablet (10 mg total) by mouth once daily.      aspirin  (ECOTRIN) 81 MG EC tablet Take 81 mg by mouth once daily.      blood sugar diagnostic Strp To check BG 4 times daily, to use with insurance preferred meter (Patient taking differently: 1 strip by Misc.(Non-Drug; Combo Route) route 4 (four) times daily. To check BG 4 times daily, to use with insurance preferred meter) 450 strip 3    calcium acetate,phosphat bind, (PHOSLO) 667 mg capsule Take 1 capsule (667 mg total) by mouth 3 (three) times daily with meals.      cetirizine (ZYRTEC) 10 MG tablet Take 1 tablet (10 mg total) by mouth every other day.      ciprofloxacin HCl (CIPRO) 500 MG tablet Take 1 tablet (500 mg total) by mouth once daily. On dialysis days give after HD. Last dose 6/29/22      fluticasone propionate (FLONASE) 50 mcg/actuation nasal spray 2 sprays (100 mcg total) by Each Nostril route once daily.      gabapentin (NEURONTIN) 100 MG capsule Take 1 capsule (100 mg total) by mouth 2 (two) times daily.      hydrALAZINE (APRESOLINE) 50 MG tablet Take 1 tablet (50 mg total) by mouth every 12 (twelve) hours.      HYDROcodone-acetaminophen (NORCO) 5-325 mg per tablet Take 1 tablet by mouth every 6 (six) hours as needed. 15 tablet 0    insulin aspart U-100 (NOVOLOG FLEXPEN U-100 INSULIN) 100 unit/mL (3 mL) InPn pen Inject 5-8 units 3 times per day with food. 1 Box 6    insulin detemir U-100 (LEVEMIR FLEXTOUCH U-100 INSULN) 100 unit/mL (3 mL) InPn pen Inject 15 Units into the skin every evening.      lancets [facility] To use to check blood sugar 4 times daily. To use with insurance approved meter. Patient needs the round, purple one (Patient taking differently: 1 lancet by Misc.(Non-Drug; Combo Route) route 4 (four) times daily. To use to check blood sugar 4 times daily. To use with insurance approved meter. Patient needs the round, purple one) 450 each 3    losartan (COZAAR) 100 MG tablet Take 1 tablet (100 mg total) by mouth once daily.      multivitamin (THERAGRAN) per tablet Take 1 tablet by mouth  "once daily.      oxybutynin (DITROPAN) 5 MG Tab Take 1 tablet (5 mg total) by mouth 3 (three) times daily.      pen needle, diabetic (BD ULTRA-FINE ROBERT PEN NEEDLE) 32 gauge x 5/32" Ndle To use 4 times per day with insulin injections. (Patient taking differently: 1 pen by Misc.(Non-Drug; Combo Route) route 4 (four) times daily. To use 4 times per day with insulin injections.) 450 each 2    polyethylene glycol (GLYCOLAX) 17 gram PwPk Take 17 g by mouth 2 (two) times daily as needed.      sodium chloride 0.9% SolP 50 mL with DAPTOmycin 500 mg SolR 500 mg Inject 500 mg into the vein every 48 hours. Until 6/29/22       No current facility-administered medications for this visit.       Review of Systems   Constitutional: Negative for chills, fatigue, fever and unexpected weight change.   HENT: Negative for hearing loss and trouble swallowing.    Eyes: Negative for photophobia and visual disturbance.   Respiratory: Negative for cough, shortness of breath and wheezing.    Cardiovascular: Negative for chest pain, palpitations and leg swelling.   Gastrointestinal: Negative for abdominal pain and nausea.   Genitourinary: Negative for dysuria and frequency.   Musculoskeletal: Positive for gait problem. Negative for arthralgias, back pain, joint swelling and myalgias.   Skin: Positive for wound. Negative for rash.   Neurological: Negative for tremors, seizures, weakness, numbness and headaches.   Hematological: Does not bruise/bleed easily.         Objective:        Physical Exam:   Foot Exam  Physical Exam  Ortho/SPM Exam     Imaging:            Assessment:       1. Diabetic ulcer of left midfoot associated with type 2 diabetes mellitus, with necrosis of muscle    2. Type 2 diabetes mellitus with hypoglycemia and coma, with long-term current use of insulin    3. 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 History of TIA (transient ischemic attack)    5. Type 2 diabetes mellitus with chronic kidney disease " on chronic dialysis, with long-term current use of insulin    6. ESRD (end stage renal disease)    7. Difficulty in walking, not elsewhere classified    8. At high risk for inadequate nutritional intake    9. Diabetic foot infection    10. Bilateral lower extremity edema    11. Ulcer of left foot with necrosis of muscle    12. At risk for readmission to hospital    13. Cellulitis and abscess of foot    14. Hypertension associated with diabetes    15. Heel ulcer, left, with necrosis of muscle    16. Decreased pedal pulses    17. Peripheral vascular disease    18. History of amputation of left foot      Plan:   Diabetic ulcer of left midfoot associated with type 2 diabetes mellitus, with necrosis of muscle    Type 2 diabetes mellitus with hypoglycemia and coma, with long-term current use of insulin    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    History of TIA (transient ischemic attack)    Type 2 diabetes mellitus with chronic kidney disease on chronic dialysis, with long-term current use of insulin    ESRD (end stage renal disease)    Difficulty in walking, not elsewhere classified    At high risk for inadequate nutritional intake    Diabetic foot infection    Bilateral lower extremity edema    Ulcer of left foot with necrosis of muscle    At risk for readmission to hospital    Cellulitis and abscess of foot    Hypertension associated with diabetes    Heel ulcer, left, with necrosis of muscle    Decreased pedal pulses    Peripheral vascular disease    History of amputation of left foot      Follow up for ER [facility].    Procedures          Counseling:     I provided patient education verbally regarding:   Patient diagnosis, treatment options, as well as alternatives, risks, and benefits.     This note was created using Dragon voice recognition software that occasionally misinterpreted phrases or words.

## 2022-06-21 NOTE — H&P
[facility] History & Physical Examination   Patient Name: Leanna García  MRN: [id number]  Patient Class: IP- Inpatient   Admission Date: 6/21/2022 10:04 AM  Length of Stay: 0  Attending Physician: Conchita Douglas MD  Primary Care Provider: Stefano Lopez MD  Face-to-Face encounter date: 06/21/2022  Code Status:full code  Chief Complaint: Foot Injury (Pt sent by MD for evaluation of her left foot.)        Patient information was obtained from patient, past medical records and ER records.   HISTORY OF PRESENT ILLNESS:   Leanna García is a 56 y.o.  female who  has a past medical history of A-fib, Arthritis, Bronchitis, Cardiovascular event risk, ASCVD 10-year risk 6.7% (10/15/2016), Diabetes mellitus, Diabetes mellitus type II, Diabetic ulcer of left midfoot (5/5/2022), Disorder of kidney and ureter, Encounter for blood transfusion, ESRD (end stage renal disease) (5/18/2018), MANJINDER (generalized anxiety disorder) (10/25/2016), History of colon polyps (11/02/2016), Hyperlipidemia, Hypertension, and Stroke.. The patient presented to [facility] on 6/21/2022 with a primary complaint of Foot Injury (Pt sent by MD for evaluation of her left foot.)  Patient is currently undergoing treat at [facility] and followed up with podiatrist today.  Podiatry evaluated patient and referred patient to the ED on account of worsening diabetic foot ulcer.     PAST MEDICAL HISTORY:     Past Medical History:   Diagnosis Date    A-fib     resolved per patient    Arthritis     Bronchitis     Cardiovascular event risk, ASCVD 10-year risk 6.7% 10/15/2016    Diabetes mellitus     Diabetes mellitus type II     Diabetic ulcer of left midfoot 5/5/2022    Disorder of kidney and ureter     Encounter for blood transfusion     ESRD (end stage renal disease) 5/18/2018    MANJINDER (generalized anxiety disorder) 10/25/2016    History of colon polyps 11/02/2016    Hyperlipidemia      Hypertension     Stroke        PAST SURGICAL HISTORY:     Past Surgical History:   Procedure Laterality Date    COLONOSCOPY N/A 10/5/2016    Procedure: COLONOSCOPY;  Surgeon: Pillo Chanel MD;  Location: [facility] ENDO;  Service: Endoscopy;  Laterality: N/A;    COLONOSCOPY N/A 4/26/2022    Procedure: COLONOSCOPY;  Surgeon: Saravanan Rachel MD;  Location: [facility] ENDO;  Service: Endoscopy;  Laterality: N/A;    HERNIA REPAIR Bilateral 11/22/2016    inguinal    HYSTERECTOMY      INCISION AND DRAINAGE FOOT Left 5/13/2022    Procedure: INCISION AND DRAINAGE, FOOT;  Surgeon: Ricardo Bruno DPM;  Location: [facility] OR;  Service: Podiatry;  Laterality: Left;    OOPHORECTOMY         ALLERGIES:   Dilaudid [hydromorphone], Vancomycin, Chlorhexidine, and Lortab [hydrocodone-acetaminophen]    FAMILY HISTORY:     Family History   Problem Relation Age of Onset    Hyperlipidemia Mother     Hypertension Mother     Hypertension Father     Diabetes Father     Diabetes Sister     Diabetes Sister     Diabetes Maternal Aunt     Diabetes Maternal Grandmother     Heart disease Maternal Grandmother     Cancer Paternal Grandmother     Breast cancer Neg Hx     Colon cancer Neg Hx     Ovarian cancer Neg Hx      Reviewed and non- contributory   SOCIAL HISTORY:     Social History     Tobacco Use    Smoking status: Never Smoker    Smokeless tobacco: Never Used   Substance Use Topics    Alcohol use: No        Social History     Substance and Sexual Activity   Sexual Activity Yes    Partners: Male        HOME MEDICATIONS:     Prior to Admission medications    Medication Sig Start Date End Date Taking? Authorizing Provider   amLODIPine (NORVASC) 10 MG tablet Take 1 tablet (10 mg total) by mouth once daily. 6/15/22 6/15/23  Abel Guillen MD   aspirin (ECOTRIN) 81 MG EC tablet Take 81 mg by mouth once daily.    Historical Provider   azelastine (ASTELIN) 137 mcg (0.1 %) nasal spray 1 spray by Nasal route 2 (two) times a day.  6/10/22   Historical Provider   blood sugar diagnostic Strp To check BG 4 times daily, to use with insurance preferred meter  Patient taking differently: 1 strip by Misc.(Non-Drug; Combo Route) route 4 (four) times daily. To check BG 4 times daily, to use with insurance preferred meter 1/19/21   Yani Sweeney NP   calcium acetate,phosphat bind, (PHOSLO) 667 mg capsule Take 1 capsule (667 mg total) by mouth 3 (three) times daily with meals. 6/14/22 6/14/23  Abel Guillen MD   cetirizine (ZYRTEC) 10 MG tablet Take 1 tablet (10 mg total) by mouth every other day. 6/16/22 6/16/23  Abel Guillen MD   ciprofloxacin HCl (CIPRO) 500 MG tablet Take 1 tablet (500 mg total) by mouth once daily. On dialysis days give after HD. Last dose 6/29/22 6/14/22   Abel Guillen MD   fluticasone propionate (FLONASE) 50 mcg/actuation nasal spray 2 sprays (100 mcg total) by Each Nostril route once daily. 6/15/22   Abel Guillen MD   gabapentin (NEURONTIN) 100 MG capsule Take 1 capsule (100 mg total) by mouth 2 (two) times daily. 6/14/22 6/14/23  Abel Guillen MD   hydrALAZINE (APRESOLINE) 50 MG tablet Take 1 tablet (50 mg total) by mouth every 12 (twelve) hours. 6/14/22 6/14/23  Abel Guillen MD   HYDROcodone-acetaminophen (NORCO) 5-325 mg per tablet Take 1 tablet by mouth every 6 (six) hours as needed. 6/14/22   Abel Guillen MD   insulin aspart U-100 (NOVOLOG FLEXPEN U-100 INSULIN) 100 unit/mL (3 mL) InPn pen Inject 5-8 units 3 times per day with food. 6/3/21   Yani Sweeney NP   insulin detemir U-100 (LEVEMIR FLEXTOUCH U-100 INSULN) 100 unit/mL (3 mL) InPn pen Inject 15 Units into the skin every evening. 6/14/22 6/14/23  Abel Guillen MD   lactulose (CHRONULAC) 10 gram/15 mL solution Take 20 g by mouth 2 (two) times a day. 6/10/22   Historical Provider   lancets Misc To use to check blood sugar 4 times daily. To use with insurance approved meter. Patient needs the round, purple one  Patient taking differently: 1 lancet  "by Misc.(Non-Drug; Combo Route) route 4 (four) times daily. To use to check blood sugar 4 times daily. To use with insurance approved meter. Patient needs the round, purple one 1/19/21   Yani Sweeney NP   losartan (COZAAR) 100 MG tablet Take 1 tablet (100 mg total) by mouth once daily. 6/15/22 6/15/23  Abel Guillen MD   multivitamin (THERAGRAN) per tablet Take 1 tablet by mouth once daily.    Historical Provider   oxybutynin (DITROPAN) 5 MG Tab Take 1 tablet (5 mg total) by mouth 3 (three) times daily. 6/14/22 6/14/23  Abel Guillen MD   pen needle, diabetic (BD ULTRA-FINE ROBERT PEN NEEDLE) 32 gauge x 5/32" Ndle To use 4 times per day with insulin injections.  Patient taking differently: 1 pen by Misc.(Non-Drug; Combo Route) route 4 (four) times daily. To use 4 times per day with insulin injections. 1/19/21   Yani Sweeney NP   polyethylene glycol (GLYCOLAX) 17 gram PwPk Take 17 g by mouth 2 (two) times daily as needed. 6/14/22   Abel Guillen MD   sodium chloride 0.9% SolP 50 mL with DAPTOmycin 500 mg SolR 500 mg Inject 500 mg into the vein every 48 hours. Until 6/29/22 6/15/22   Abel Guillen MD   atorvastatin (LIPITOR) 80 MG tablet Take 1 tablet (80 mg total) by mouth once daily.  Patient taking differently: Take 80 mg by mouth every evening. 5/28/21 5/19/22  Stefano Lopez MD   blood-glucose meter (ACCU-CHEK KAYLIE PLUS METER) Misc To use to check blood sugars 4 times a day 7/6/20 5/4/22  Yani Sweeney NP   clorazepate (TRANXENE) 3.75 MG Tab Take 7.5 mg by mouth nightly as needed.   5/17/17  Historical Provider   dextrose (GLUCOSE GEL) 40 % gel Take 37.5 mLs (15,000 mg total) by mouth once as needed (hypoglycemia). 6/3/21 5/19/22  Yani Sweeney NP   ezetimibe (ZETIA) 10 mg tablet Take 1 tablet (10 mg total) by mouth once daily. 5/19/22 6/14/22  Usman Stacy MD   fenofibrate 160 MG Tab Take 1 tablet (160 mg total) by mouth once daily. 5/28/21 5/4/22  Stefano Lopez MD   lancing device with " "lancets (ACCU-CHEK SOFT DEV LANCETS) Kit To check blood sugars 4 times per day 2/2/21 5/4/22  Yani Sweeney NP   pantoprazole (PROTONIX) 40 MG tablet Take 1 tablet (40 mg total) by mouth once daily. 5/19/22 6/14/22  Usman Stacy MD       REVIEW OF SYSTEMS:   10 Point Review of System was performed and was found to be negative except for that mentioned already in the HPI above.     PHYSICAL EXAM:   BP (!) 200/82   Pulse 72   Temp 98.4 °F (36.9 °C) (Oral)   Resp 18   Ht 5' 9" (1.753 m)   Wt 93 kg (205 lb)   SpO2 95%   BMI 30.27 kg/m²     Vitals Reviewed  General appearance: Well-developed, well-nourished female in no apparent distress.  HENT: Atraumatic head. Moist mucous membranes of oral cavity.  Eyes: Normal extraocular movements.   Neck: Supple.   Lungs: Clear to auscultation bilaterally. No wheezing present.   Heart: Regular rate and rhythm. S1 and S2 present with no murmurs/gallop/rub. No pedal edema. No JVD present.   Abdomen: Soft, non-distended, non-tender. No rebound tenderness/guarding. Bowel sounds are normal.   Extremities: Non healing left foot ulcer  Skin: No Rash.   Neuro: Motor and sensory exams grossly intact. Good tone. Muscle strength 5/5 in all 4 extremities  Psych/mental status: Appropriate mood and affect. Responds appropriately to questions.     EMERGENCY DEPARTMENT LABS AND IMAGING:     Labs Reviewed   CBC W/ AUTO DIFFERENTIAL - Abnormal; Notable for the following components:       Result Value    Hemoglobin 10.7 (*)     Hematocrit 36.2 (*)     MCH 25.8 (*)     MCHC 29.6 (*)     RDW 16.8 (*)     Immature Granulocytes 0.6 (*)     Immature Grans (Abs) 0.05 (*)     Lymph % 17.9 (*)     All other components within normal limits   COMPREHENSIVE METABOLIC PANEL - Abnormal; Notable for the following components:    Sodium 134 (*)     Chloride 92 (*)     CO2 31 (*)     Glucose 129 (*)     BUN 44 (*)     Creatinine 5.8 (*)     Albumin 3.4 (*)     eGFR if  8.7 (*)     eGFR if non "  7.5 (*)     All other components within normal limits   URINALYSIS, REFLEX TO URINE CULTURE - Abnormal; Notable for the following components:    pH, UA >8.0 (*)     Protein, UA 3+ (*)     Glucose, UA 2+ (*)     All other components within normal limits    Narrative:     Specimen Source->Urine   URINALYSIS MICROSCOPIC - Abnormal; Notable for the following components:    Hyaline Casts, UA 12 (*)     All other components within normal limits    Narrative:     Specimen Source->Urine   CULTURE, BLOOD   CULTURE, BLOOD   MAGNESIUM   LACTIC ACID, PLASMA   SARS-COV-2 RNA AMPLIFICATION, QUAL       X-Ray Foot Complete Bilateral   Final Result          ASSESSMENT & PLAN:   Leanna García is a 56 y.o. female admitted for    Active Hospital Problems    Diagnosis    *Diabetic ulcer of left midfoot  Morphine 2mg q.4h p.r.n. for pain  Continue IV daptomycin and oral Cipro patient was on prior  Blood cultures  Consult to Podiatry, Wound Care, ID, Vascular surg  MRI done 2 week back unremarkable for osteo. Repeat?  Wound care and cultures      Type 2 diabetes mellitus with kidney complication, with long-term current use of insulin  Continue Levemir 15 units nightly  Insulin sliding scale  Regular accuchek      ESRD (end stage renal disease)  Consult nephrology for dialysis      Hypertension associated with diabetes  Resume home medication of hydralazine 50mg BID, losartan 100mg, amlodipine 10mg daily and titrate upwards as needed      PAD  Consult vascular surgeon  Atorvastatin 40 mg daily  Aspirin 81 mg daily       DVT Prophylaxis: will be placed on Lovenox for DVT prophylaxis and will be advised to be as mobile as possible and sit in a chair as tolerated.   ________________________________________________________________________________      Face-to-Face encounter date: 06/21/2022  Encounter included review of the medical records, interviewing and examining the patient face-to-face, discussion with family and  other health care providers including emergency medicine physician, admission orders, interpreting lab/test results and formulating a plan of care.     Medical Decision Making during this encounter was  [_] Low Complexity  [_] Moderate Complexity  [X] High Complexity  _________________________________________________________________________________    INPATIENT LIST OF MEDICATIONS     Current Facility-Administered Medications:     [START ON 6/22/2022] amLODIPine tablet 10 mg, 10 mg, Oral, Daily, Conchita Douglas MD    [START ON 6/22/2022] aspirin EC tablet 81 mg, 81 mg, Oral, Daily, Conchita Douglas MD    atorvastatin tablet 80 mg, 80 mg, Oral, QHS, Conchita Douglas MD    enoxaparin injection 30 mg, 30 mg, Subcutaneous, Daily, Conchita Douglas MD    gabapentin capsule 100 mg, 100 mg, Oral, BID, Conchita Douglas MD    hydrALAZINE injection 10 mg, 10 mg, Intravenous, Q4H PRN, Conchita Douglas MD, 10 mg at 06/21/22 1645    hydrALAZINE tablet 50 mg, 50 mg, Oral, Q12H, Conchita Douglas MD    ibuprofen tablet 400 mg, 400 mg, Oral, Q6H PRN, Conchita Douglas MD    insulin detemir U-100 pen 15 Units, 15 Units, Subcutaneous, QHS, Conchita Douglas MD    lactulose 20 gram/30 mL solution Soln 20 g, 20 g, Oral, BID, Conchita Douglas MD    [START ON 6/22/2022] losartan tablet 100 mg, 100 mg, Oral, Daily, Conchita Douglas MD    morphine injection 2 mg, 2 mg, Intravenous, Q4H PRN, Conchita Douglas MD    ondansetron injection 4 mg, 4 mg, Intravenous, Q8H PRN, Conchita Douglas MD    sodium chloride 0.9% flush 10 mL, 10 mL, Intravenous, Q12H PRN, Conchita Douglas MD      Scheduled Meds:   [START ON 6/22/2022] amLODIPine  10 mg Oral Daily    [START ON 6/22/2022] aspirin  81 mg Oral Daily    atorvastatin  80 mg Oral QHS    enoxaparin  30 mg Subcutaneous Daily    gabapentin  100 mg Oral BID    hydrALAZINE  50 mg Oral Q12H    insulin detemir U-100  15 Units Subcutaneous QHS    lactulose  20 g Oral BID     [START ON 6/22/2022] losartan  100 mg Oral Daily     Continuous Infusions:  PRN Meds:.hydrALAZINE, ibuprofen, morphine, ondansetron, sodium chloride 0.9%      Conchita Douglas MD  [facility] Hospitalist  06/21/2022

## 2022-06-21 NOTE — Clinical Note
An angiography of the  left lower extremity was performed with the catheter and hand injected with 20 mL of contrast

## 2022-06-21 NOTE — Clinical Note
An angiography of the  bilateral lower extremity was performed with the sheath and hand injected with 10 mL of contrast

## 2022-06-22 LAB
ANION GAP SERPL CALC-SCNC: 11 MMOL/L (ref 8–16)
BASOPHILS # BLD AUTO: 0.08 K/UL (ref 0–0.2)
BASOPHILS NFR BLD: 0.9 % (ref 0–1.9)
BUN SERPL-MCNC: 57 MG/DL (ref 6–20)
CALCIUM SERPL-MCNC: 9.4 MG/DL (ref 8.7–10.5)
CHLORIDE SERPL-SCNC: 98 MMOL/L (ref 95–110)
CK SERPL-CCNC: 31 U/L (ref 20–180)
CO2 SERPL-SCNC: 30 MMOL/L (ref 23–29)
CREAT SERPL-MCNC: 7.6 MG/DL (ref 0.5–1.4)
CRP SERPL-MCNC: 5.28 MG/DL
DIFFERENTIAL METHOD: ABNORMAL
EOSINOPHIL # BLD AUTO: 0.4 K/UL (ref 0–0.5)
EOSINOPHIL NFR BLD: 5.1 % (ref 0–8)
ERYTHROCYTE [DISTWIDTH] IN BLOOD BY AUTOMATED COUNT: 16.8 % (ref 11.5–14.5)
EST. GFR  (AFRICAN AMERICAN): 6.3 ML/MIN/1.73 M^2
EST. GFR  (NON AFRICAN AMERICAN): 5.4 ML/MIN/1.73 M^2
ESTIMATED AVG GLUCOSE: 169 MG/DL (ref 68–131)
GLUCOSE SERPL-MCNC: 115 MG/DL (ref 70–110)
GLUCOSE SERPL-MCNC: 124 MG/DL (ref 70–110)
GLUCOSE SERPL-MCNC: 157 MG/DL (ref 70–110)
GLUCOSE SERPL-MCNC: 158 MG/DL (ref 70–110)
HBA1C MFR BLD: 7.5 % (ref 4.5–6.2)
HCT VFR BLD AUTO: 28.9 % (ref 37–48.5)
HGB BLD-MCNC: 8.6 G/DL (ref 12–16)
IMM GRANULOCYTES # BLD AUTO: 0.04 K/UL (ref 0–0.04)
IMM GRANULOCYTES NFR BLD AUTO: 0.5 % (ref 0–0.5)
LYMPHOCYTES # BLD AUTO: 1.8 K/UL (ref 1–4.8)
LYMPHOCYTES NFR BLD: 20.2 % (ref 18–48)
MAGNESIUM SERPL-MCNC: 2.7 MG/DL (ref 1.6–2.6)
MCH RBC QN AUTO: 26 PG (ref 27–31)
MCHC RBC AUTO-ENTMCNC: 29.8 G/DL (ref 32–36)
MCV RBC AUTO: 87 FL (ref 82–98)
MONOCYTES # BLD AUTO: 0.9 K/UL (ref 0.3–1)
MONOCYTES NFR BLD: 10 % (ref 4–15)
NEUTROPHILS # BLD AUTO: 5.5 K/UL (ref 1.8–7.7)
NEUTROPHILS NFR BLD: 63.3 % (ref 38–73)
NRBC BLD-RTO: 0 /100 WBC
PLATELET # BLD AUTO: 350 K/UL (ref 150–450)
PMV BLD AUTO: 9.2 FL (ref 9.2–12.9)
POTASSIUM SERPL-SCNC: 4.6 MMOL/L (ref 3.5–5.1)
RBC # BLD AUTO: 3.31 M/UL (ref 4–5.4)
SODIUM SERPL-SCNC: 139 MMOL/L (ref 136–145)
WBC # BLD AUTO: 8.67 K/UL (ref 3.9–12.7)

## 2022-06-22 PROCEDURE — 83036 HEMOGLOBIN GLYCOSYLATED A1C: CPT | Performed by: STUDENT IN AN ORGANIZED HEALTH CARE EDUCATION/TRAINING PROGRAM

## 2022-06-22 PROCEDURE — 36415 COLL VENOUS BLD VENIPUNCTURE: CPT | Performed by: STUDENT IN AN ORGANIZED HEALTH CARE EDUCATION/TRAINING PROGRAM

## 2022-06-22 PROCEDURE — 63600175 PHARM REV CODE 636 W HCPCS: Performed by: STUDENT IN AN ORGANIZED HEALTH CARE EDUCATION/TRAINING PROGRAM

## 2022-06-22 PROCEDURE — 80048 BASIC METABOLIC PNL TOTAL CA: CPT | Performed by: STUDENT IN AN ORGANIZED HEALTH CARE EDUCATION/TRAINING PROGRAM

## 2022-06-22 PROCEDURE — 25000003 PHARM REV CODE 250: Performed by: PODIATRIST

## 2022-06-22 PROCEDURE — 63600175 PHARM REV CODE 636 W HCPCS: Performed by: INTERNAL MEDICINE

## 2022-06-22 PROCEDURE — 12000002 HC ACUTE/MED SURGE SEMI-PRIVATE ROOM

## 2022-06-22 PROCEDURE — 90935 HEMODIALYSIS ONE EVALUATION: CPT

## 2022-06-22 PROCEDURE — 99223 1ST HOSP IP/OBS HIGH 75: CPT | Mod: ,,, | Performed by: INTERNAL MEDICINE

## 2022-06-22 PROCEDURE — 99222 PR INITIAL HOSPITAL CARE,LEVL II: ICD-10-PCS | Mod: ,,, | Performed by: PODIATRIST

## 2022-06-22 PROCEDURE — 25000003 PHARM REV CODE 250: Performed by: STUDENT IN AN ORGANIZED HEALTH CARE EDUCATION/TRAINING PROGRAM

## 2022-06-22 PROCEDURE — 97605 NEG PRS WND THER DME<=50SQCM: CPT

## 2022-06-22 PROCEDURE — 99222 1ST HOSP IP/OBS MODERATE 55: CPT | Mod: ,,, | Performed by: PODIATRIST

## 2022-06-22 PROCEDURE — 25000003 PHARM REV CODE 250: Performed by: INTERNAL MEDICINE

## 2022-06-22 PROCEDURE — 85025 COMPLETE CBC W/AUTO DIFF WBC: CPT | Performed by: STUDENT IN AN ORGANIZED HEALTH CARE EDUCATION/TRAINING PROGRAM

## 2022-06-22 PROCEDURE — 86140 C-REACTIVE PROTEIN: CPT | Performed by: STUDENT IN AN ORGANIZED HEALTH CARE EDUCATION/TRAINING PROGRAM

## 2022-06-22 PROCEDURE — 82550 ASSAY OF CK (CPK): CPT | Performed by: STUDENT IN AN ORGANIZED HEALTH CARE EDUCATION/TRAINING PROGRAM

## 2022-06-22 PROCEDURE — 99223 PR INITIAL HOSPITAL CARE,LEVL III: ICD-10-PCS | Mod: ,,, | Performed by: INTERNAL MEDICINE

## 2022-06-22 PROCEDURE — 83735 ASSAY OF MAGNESIUM: CPT | Performed by: STUDENT IN AN ORGANIZED HEALTH CARE EDUCATION/TRAINING PROGRAM

## 2022-06-22 RX ORDER — MUPIROCIN 20 MG/G
OINTMENT TOPICAL 2 TIMES DAILY
Status: COMPLETED | OUTPATIENT
Start: 2022-06-22 | End: 2022-06-26

## 2022-06-22 RX ORDER — SODIUM CHLORIDE 9 MG/ML
INJECTION, SOLUTION INTRAVENOUS
Status: CANCELLED | OUTPATIENT
Start: 2022-06-22

## 2022-06-22 RX ORDER — SODIUM CHLORIDE 9 MG/ML
INJECTION, SOLUTION INTRAVENOUS ONCE
Status: CANCELLED | OUTPATIENT
Start: 2022-06-22 | End: 2022-06-22

## 2022-06-22 RX ADMIN — LACTULOSE 20 G: 20 SOLUTION ORAL at 08:06

## 2022-06-22 RX ADMIN — INSULIN DETEMIR 15 UNITS: 100 INJECTION, SOLUTION SUBCUTANEOUS at 09:06

## 2022-06-22 RX ADMIN — AMPICILLIN SODIUM AND SULBACTAM SODIUM 3 G: 2; 1 INJECTION, POWDER, FOR SOLUTION INTRAMUSCULAR; INTRAVENOUS at 09:06

## 2022-06-22 RX ADMIN — AMPICILLIN SODIUM AND SULBACTAM SODIUM 3 G: 2; 1 INJECTION, POWDER, FOR SOLUTION INTRAMUSCULAR; INTRAVENOUS at 03:06

## 2022-06-22 RX ADMIN — INSULIN ASPART 1 UNITS: 100 INJECTION, SOLUTION INTRAVENOUS; SUBCUTANEOUS at 09:06

## 2022-06-22 RX ADMIN — MUPIROCIN: 20 OINTMENT TOPICAL at 09:06

## 2022-06-22 RX ADMIN — GABAPENTIN 100 MG: 100 CAPSULE ORAL at 08:06

## 2022-06-22 RX ADMIN — COLLAGENASE SANTYL: 250 OINTMENT TOPICAL at 03:06

## 2022-06-22 RX ADMIN — LOSARTAN POTASSIUM 100 MG: 50 TABLET, FILM COATED ORAL at 08:06

## 2022-06-22 RX ADMIN — MUPIROCIN: 20 OINTMENT TOPICAL at 03:06

## 2022-06-22 RX ADMIN — LACTULOSE 20 G: 20 SOLUTION ORAL at 09:06

## 2022-06-22 RX ADMIN — GABAPENTIN 100 MG: 100 CAPSULE ORAL at 09:06

## 2022-06-22 RX ADMIN — AMLODIPINE BESYLATE 10 MG: 5 TABLET ORAL at 08:06

## 2022-06-22 RX ADMIN — ENOXAPARIN SODIUM 30 MG: 30 INJECTION SUBCUTANEOUS at 05:06

## 2022-06-22 RX ADMIN — ASPIRIN 81 MG: 81 TABLET, COATED ORAL at 08:06

## 2022-06-22 RX ADMIN — HYDRALAZINE HYDROCHLORIDE 50 MG: 25 TABLET ORAL at 08:06

## 2022-06-22 RX ADMIN — CIPROFLOXACIN 500 MG: 500 TABLET, FILM COATED ORAL at 08:06

## 2022-06-22 RX ADMIN — HYDRALAZINE HYDROCHLORIDE 50 MG: 25 TABLET ORAL at 09:06

## 2022-06-22 RX ADMIN — MORPHINE SULFATE 2 MG: 2 INJECTION, SOLUTION INTRAMUSCULAR; INTRAVENOUS at 03:06

## 2022-06-22 RX ADMIN — ATORVASTATIN CALCIUM 80 MG: 40 TABLET, FILM COATED ORAL at 09:06

## 2022-06-22 RX ADMIN — MORPHINE SULFATE 2 MG: 2 INJECTION, SOLUTION INTRAMUSCULAR; INTRAVENOUS at 08:06

## 2022-06-22 RX ADMIN — ONDANSETRON 4 MG: 2 INJECTION INTRAMUSCULAR; INTRAVENOUS at 08:06

## 2022-06-22 NOTE — CONSULTS
Critical access hospital  Podiatry  Consult Note    Patient Name: Leanna García  MRN: [id number]  Admission Date: 6/21/2022  Hospital Length of Stay: 1 days  Attending Physician: Conchita Douglas MD  Primary Care Provider: Stefano Lopez MD     Inpatient consult to Podiatry  Consult performed by: Ricardo Bruno DPM  Consult ordered by: Conchita Douglas MD        Subjective:     History of Present Illness: 56 y.o.  female who  has a past medical history of A-fib, Arthritis, Bronchitis, Cardiovascular event risk, ASCVD 10-year risk 6.7% (10/15/2016), Diabetes mellitus, Diabetes mellitus type II, Diabetic ulcer of left midfoot (5/5/2022), Disorder of kidney and ureter, Encounter for blood transfusion, ESRD (end stage renal disease) (5/18/2018), MANJINDER (generalized anxiety disorder) (10/25/2016), History of colon polyps (11/02/2016), Hyperlipidemia, Hypertension, and Stroke.. The patient [facility] on 6[facility]21/2022 with a primary complaint of Foot Injury    Patient was admitted to the LTAC following her last admission.  She was then discharged to nursing home.  She has been on antibiotics.  She has been followed in the outpatient wound care center by Dr. Gilberto Bruno.  The foot has not been progressing well.  MRI was ordered several weeks ago which did not show osteomyelitis.  Unable to obtain MRI with without contrast due to end-stage renal disease.  Arterial ultrasound from patient's last admission showed diffuse peripheral arterial disease.    Scheduled Meds:   amLODIPine  10 mg Oral Daily    ampicillin-sulbactim (UNASYN) IVPB  3 g Intravenous Q12H    aspirin  81 mg Oral Daily    atorvastatin  80 mg Oral QHS    ciprofloxacin HCl  500 mg Oral Daily    collagenase   Topical (Top) Daily    DAPTOmycin (CUBICIN)  IV  500 mg Intravenous Q48H    enoxaparin  30 mg Subcutaneous Daily    epoetin chris-ebpx (RETACRIT) injection  50 Units/kg Intravenous Every Mon, Wed, Fri     gabapentin  100 mg Oral BID    hydrALAZINE  50 mg Oral Q12H    insulin detemir U-100  15 Units Subcutaneous QHS    lactulose  20 g Oral BID    losartan  100 mg Oral Daily    mupirocin   Nasal BID     Continuous Infusions:  PRN Meds:dextrose 50%, dextrose 50%, glucagon (human recombinant), glucose, glucose, hydrALAZINE, ibuprofen, insulin aspart U-100, morphine, ondansetron, sodium chloride 0.9%    Review of patient's allergies indicates:   Allergen Reactions    Dilaudid [hydromorphone] Nausea And Vomiting    Vancomycin Itching    Chlorhexidine Itching    Lortab [hydrocodone-acetaminophen] Itching        Past Medical History:   Diagnosis Date    A-fib     resolved per patient    Arthritis     Bronchitis     Cardiovascular event risk, ASCVD 10-year risk 6.7% 10/15/2016    Diabetes mellitus     Diabetes mellitus type II     Diabetic ulcer of left midfoot 5/5/2022    Disorder of kidney and ureter     Encounter for blood transfusion     ESRD (end stage renal disease) 5/18/2018    MANJINDER (generalized anxiety disorder) 10/25/2016    History of colon polyps 11/02/2016    Hyperlipidemia     Hypertension     Stroke      Past Surgical History:   Procedure Laterality Date    COLONOSCOPY N/A 10/5/2016    Procedure: COLONOSCOPY;  Surgeon: Pillo Chanel MD;  Location: [facility];  Service: Endoscopy;  Laterality: N/A;    COLONOSCOPY N/A 4/26/2022    Procedure: COLONOSCOPY;  Surgeon: Saravanan Rachel MD;  Location: [facility];  Service: Endoscopy;  Laterality: N/A;    HERNIA REPAIR Bilateral 11/22/2016    inguinal    HYSTERECTOMY      INCISION AND DRAINAGE FOOT Left 5/13/2022    Procedure: INCISION AND DRAINAGE, FOOT;  Surgeon: Ricardo Bruno DPM;  Location: [facility] OR;  Service: Podiatry;  Laterality: Left;    OOPHORECTOMY         Family History     Problem Relation (Age of Onset)    Cancer Paternal Grandmother    Diabetes Father, Sister, Sister, Maternal Aunt, Maternal Grandmother    Heart disease  Maternal Grandmother    Hyperlipidemia Mother    Hypertension Mother, Father        Tobacco Use    Smoking status: Never Smoker    Smokeless tobacco: Never Used   Substance and Sexual Activity    Alcohol use: No    Drug use: No    Sexual activity: Yes     Partners: Male     Review of Systems   Constitutional: Negative for chills, fatigue, fever and unexpected weight change.   HENT: Negative for hearing loss and trouble swallowing.    Eyes: Negative for photophobia and visual disturbance.   Respiratory: Negative for cough, shortness of breath and wheezing.    Cardiovascular: Positive for leg swelling. Negative for chest pain and palpitations.   Gastrointestinal: Negative for abdominal pain and nausea.   Genitourinary: Negative for dysuria and frequency.   Musculoskeletal: Positive for gait problem and joint swelling. Negative for arthralgias, back pain and myalgias.   Skin: Positive for color change and wound. Negative for rash.   Neurological: Positive for numbness. Negative for tremors, seizures, weakness and headaches.   Hematological: Does not bruise/bleed easily.     Objective:     Vital Signs (Most Recent):  Temp: 98.1 °F (36.7 °C) (06/22/22 0940)  Pulse: 60 (06/22/22 1200)  Resp: 18 (06/22/22 0940)  BP: (!) 149/59 (06/22/22 1200)  SpO2: 97 % (06/22/22 0940) Vital Signs (24h Range):  Temp:  [97.7 °F (36.5 °C)-98.5 °F (36.9 °C)] 98.1 °F (36.7 °C)  Pulse:  [59-74] 60  Resp:  [17-18] 18  SpO2:  [92 %-97 %] 97 %  BP: (116-200)/(54-88) 149/59     Weight: 89.1 kg (196 lb 6.9 oz)  Body mass index is 29.87 kg/m².    Foot Exam    Laboratory:  A1C:   Recent Labs   Lab 03/10/22  0650 05/05/22  0320 05/29/22  1324   HGBA1C >14.0* 10.6* 8.3*     CBC:   Recent Labs   Lab 06/22/22  0618   WBC 8.67   RBC 3.31*   HGB 8.6*   HCT 28.9*      MCV 87   MCH 26.0*   MCHC 29.8*     CMP:   Recent Labs   Lab 06/21/22  1143 06/22/22  0618   * 115*   CALCIUM 10.0 9.4   ALBUMIN 3.4*  --    PROT 8.1  --    * 139   K  4.6 4.6   CO2 31* 30*   CL 92* 98   BUN 44* 57*   CREATININE 5.8* 7.6*   ALKPHOS 67  --    ALT 11  --    AST 15  --    BILITOT 0.4  --      CRP:   Recent Labs   Lab 06/22/22  0618   CRP 5.28*     ESR: No results for input(s): SEDRATE in the last 168 hours.  Wound Cultures:   Recent Labs   Lab 05/06/22  0753 05/13/22  0800 06/07/22  1400   LABAERO METHICILLIN RESISTANT STAPHYLOCOCCUS AUREUS  Many  Results called to and read back by Kathy Maki RN-BCARD;    05/08/2022  08:32 CJD  * Skin kunal,  no predominant organism ACINETOBACTER BAUMANNII/HAEMOLYTICUS  Moderate  *       Diagnostic Results:  I have reviewed all pertinent imaging results/findings within the past 24 hours.     X-Ray Foot Complete Bilateral  Reason: Diabetic ulcers.    FINDINGS:    Multiple views of the bilateral feet.    LEFT FOOT:    No acute fracture or dislocation observed. Amputation and osteotomy of the fourth digit noted. Pes planus observed. There are moderate degenerative changes of the midfoot bones. There is osteophytosis of the talar neck. No destructive osseous lesion or periosteal reaction observed. Ulcer of the plantar soft tissues of the foot noted. Marked atherosclerosis observed. Small calcaneal spur.    RIGHT FOOT:    No acute fracture or dislocation. No destructive osseous lesion. There are moderate degenerative changes of the mid foot. Calcaneocuboid degeneration observed. Pes planus noted. Calcaneal spur and Achilles enthesopathy noted. Marked atherosclerosis observed.    IMPRESSION:    1.  Bilateral degenerative changes and pes planus as described.  2.  No destructive osseous lesions observed.  3.  Ulcer in the plantar soft tissues of the left foot.  4.  Marked bilateral atherosclerosis.  5.  Amputation an osteotomy of the left fourth digit.    Electronically signed by:  Kiel Garvey DO  6/21/2022 11:30 AM CDT Workstation: [id number]        Clinical Findings:                              Assessment/Plan:     Active  Diagnoses:    Diagnosis Date Noted POA    PRINCIPAL PROBLEM:  Diabetic ulcer of left midfoot [E11.621, L97.429] 05/05/2022 Yes    MRSA (methicillin resistant Staphylococcus aureus) [A49.02]  Unknown    Infection due to Acinetobacter species [A49.8]  Unknown    Encounter for long-term (current) use of antibiotics [Z79.2]  Not Applicable    Type 2 diabetes mellitus with kidney complication, with long-term current use of insulin [E11.29, Z79.4] 06/25/2020 Not Applicable    ESRD (end stage renal disease) [N18.6] 05/18/2018 Yes    Chronic diastolic heart failure [I50.32] 10/15/2016 Yes    Hypertension associated with diabetes [E11.59, I15.2] 06/13/2013 Yes     Chronic      Problems Resolved During this Admission:       Await vascular evaluation.  Plan will be to continue wound VAC therapy to the left foot for now.  At a minimum, patient will require further surgical debridement of the left foot and left heel.  Patient's foot has continued to deteriorate despite continued antibiotics and aggressive wound care.  She is at risk for limb loss.    Thank you for your consult. I will follow-up with patient. Please contact us if you have any additional questions.    Ricardo Bruno DPM  Podiatry  [facility]

## 2022-06-22 NOTE — CONSULTS
6/21/22  Chart reviewed, cultures and [facility] ID notes, podiatry notes and available photos.   unasyn added to cover acinetobacter  Will see in person 6/22 6/22/22  Consult Note  Infectious Disease    Reason for Consult:  Diabetic foot infection, PAD    HPI: Leanna García is a 56 y.o. female known to me from long hospitalization in May at which time she was treated for bacillus serious bacteremia from a food-borne infection and for MRSA infection of the left medial foot which required several debridements and prolonged IV antibiotics.  MRIs were negative for osteomyelitis and she was transferred to [facility] where she was until 6/14.  One 6/7 she saw Dr. Bruno in the wound clinic and a culture was obtained growing Acinetobacter.  MRI on 06/08 of the forefoot was negative.  Her antibiotics were adjusted by ID at the [facility] and the at the time of her transfer from [facility] to the nursing home she was on daptomycin and oral Cipro.  She was seen in Wound Care Clinic yesterday and felt to have not had adequate improvement in was referred to the hospital for admission.  Amputation was recommended but declined by the patient.  She has a history of poorly controlled diabetes and her A1c which was greater than 14% earlier this year was most recently 8% in May.  She does not currently have a fever or leukocytosis in compared to my last examination of her foot the medial foot wound is smaller and shallower but the left great toe is erythematous and bruised appearing.  She suffered deep tissue injury to the left heel while hospitalized here in May and now has a hard eschar over the left heel.    Review of patient's allergies indicates:   Allergen Reactions    Dilaudid [hydromorphone] Nausea And Vomiting    Vancomycin Itching    Chlorhexidine Itching    Lortab [hydrocodone-acetaminophen] Itching     Past Medical History:   Diagnosis Date    A-fib     resolved per patient    Arthritis     Bronchitis     Cardiovascular event  risk, ASCVD 10-year risk 6.7% 10/15/2016    Diabetes mellitus     Diabetes mellitus type II     Diabetic ulcer of left midfoot 5/5/2022    Disorder of kidney and ureter     Encounter for blood transfusion     ESRD (end stage renal disease) 5/18/2018    MANJINDER (generalized anxiety disorder) 10/25/2016    History of colon polyps 11/02/2016    Hyperlipidemia     Hypertension     Stroke      Past Surgical History:   Procedure Laterality Date    COLONOSCOPY N/A 10/5/2016    Procedure: COLONOSCOPY;  Surgeon: Pillo Chanel MD;  Location: [facility] ENDO;  Service: Endoscopy;  Laterality: N/A;    COLONOSCOPY N/A 4/26/2022    Procedure: COLONOSCOPY;  Surgeon: Saravanan Rachel MD;  Location: [facility] ENDO;  Service: Endoscopy;  Laterality: N/A;    HERNIA REPAIR Bilateral 11/22/2016    inguinal    HYSTERECTOMY      INCISION AND DRAINAGE FOOT Left 5/13/2022    Procedure: INCISION AND DRAINAGE, FOOT;  Surgeon: Ricardo Bruno DPM;  Location: [facility] OR;  Service: Podiatry;  Laterality: Left;    OOPHORECTOMY       Social History     Socioeconomic History    Marital status:    Tobacco Use    Smoking status: Never Smoker    Smokeless tobacco: Never Used   Substance and Sexual Activity    Alcohol use: No    Drug use: No    Sexual activity: Yes     Partners: Male   Social History Narrative    Caregiver      Family History   Problem Relation Age of Onset    Hyperlipidemia Mother     Hypertension Mother     Hypertension Father     Diabetes Father     Diabetes Sister     Diabetes Sister     Diabetes Maternal Aunt     Diabetes Maternal Grandmother     Heart disease Maternal Grandmother     Cancer Paternal Grandmother     Breast cancer Neg Hx     Colon cancer Neg Hx     Ovarian cancer Neg Hx          Review of Systems:   She believes that she had a fever sometimes since June 14th but cannot tell me specifically when   No change in vision  No pain in mouth or throat. No problems with teeth, gums.  No  chest pain,    No cough, sputum production, shortness of breath,    No nausea, vomiting, diarrhea,   or focal abd pain,     No swelling of joints, redness of joints, injuries, or new focal pain  No unusual headaches, but is nonambulatory and does not have normal sensation in her feet   No anxiety, depression, substance abuse, sleep disturbance  Historically poor diabetes control  No bleeding, lymphadenopathy,  malignancy,    No new rashes,      Antibiotic History: daptomycin    EXAM & DIAGNOSTICS REVIEWED:   Vitals:     Temp:  [97.7 °F (36.5 °C)-98.5 °F (36.9 °C)]   Temp: 98.4 °F (36.9 °C) (06/22/22 0404)  Pulse: 69 (06/22/22 0404)  Resp: 18 (06/22/22 0404)  BP: (!) 142/68 (06/22/22 0404)  SpO2: (!) 92 % (06/22/22 0404)  No intake or output data in the 24 hours ending 06/22/22 0808    General:  In NAD. Alert and attentive, cooperative, comfortable. Has facial and scleral puffiness  Eyes:  Anicteric, PERRL, EOMI. Scleral puffiness  ENT:  No ulcers, exudates, thrush, nares patent,    Neck:  supple, no masses or adenopathy appreciated  Lungs: Clear, no consolidation, rales, wheezes, rub  Heart:  RRR, no gallop/murmur/rub noted  Abd:  Soft, NT, ND, normal BS, no masses or organomegaly appreciated.  :  Voids   Musc:  Joints without effusion, swelling, erythema, synovitis,  .   Skin:  No rashes.   Neuro:             Alert, attentive, speech fluent, face symmetric, moves all extremities, no focal weakness. Ambulatory  Psych:  Calm, cooperative  Lymphatic:      Extrem: No pitting edema,   Warm. See photos below  VAD:     Right arm midline, not working?  Isolation:  contact  Wound:   The left  Foot medial wound is smaller and shallower. The left great toe is erythematous, peeling, ?bruised  There is a dense black eschar left heel             General Labs reviewed:  Recent Labs   Lab 06/21/22  1143 06/22/22  0618   WBC 8.92 8.67   HGB 10.7* 8.6*   HCT 36.2* 28.9*    350       Recent Labs   Lab 06/21/22  1143  06/22/22 0618   * 139   K 4.6 4.6   CL 92* 98   CO2 31* 30*   BUN 44* 57*   CREATININE 5.8* 7.6*   CALCIUM 10.0 9.4   PROT 8.1  --    BILITOT 0.4  --    ALKPHOS 67  --    ALT 11  --    AST 15  --      Recent Labs   Lab 06/22/22 0618   CRP 5.28*         Micro:  Microbiology Results (last 7 days)     Procedure Component Value Units Date/Time    Blood culture #1 **CANNOT BE ORDERED STAT** [[id number]] Collected: 06/21/22 1143    Order Status: Completed Specimen: Blood from Peripheral, Wrist, Right Updated: 06/21/22 1917     Blood Culture, Routine No Growth to date    Blood culture #2 **CANNOT BE ORDERED STAT** [[id number]] Collected: 06/21/22 1155    Order Status: Completed Specimen: Blood from Peripheral, Antecubital, Right Updated: 06/21/22 1917     Blood Culture, Routine No Growth to date    Aerobic culture [[id number]]     Order Status: No result Specimen: Wound           Imaging Reviewed:  5/6/22 arterial US  1. No arterial occlusion or hemodynamically significant stenosis identified.  2. Diffuse bilateral lower extremity peripheral arterial disease.    MRI left forefoot 6/8 negative    Foot xray      Cardiology:    IMPRESSION & PLAN   1. Chronic left foot wounds, s/p abscess with MRSA, and clinically not infected   Left great toe is erythematous, and left heel has eschar due to decubitus   Not making much progress, but would not call it non-salvageable     2. Poorly controlled diabetes, improved  3. PAD, small vessel presumed  4. ESRD, may need more volume removed  5. Recent h/o Bacillus cereus bacteremia      Recommendations:  Continue daptomycin and unasyn  ?needs extra fluid removal   Awaiting vascular evaluation      Medical Decision Making during this encounter was  [_] Low Complexity  [_] Moderate Complexity  [ xx ] High Complexity  D/w Dr. PA Bruno

## 2022-06-22 NOTE — CONSULTS
Left great toe red inflammed, small amount of bleeding below toenail on plantar foot.  Ulcer medial foot black dry eschar 3.5x2cm cleaned and dried and Santyl applied to wound bed, covered with Aquacel ag, maceration between great and 2nd toe, covered with gauze dressing.  Plantar wound 1x0.8x0.5cm black wound bed.  Cleaned and dried and applied Santyl packed with aquacel ag, wound vac applied to plantar medial foot 2.8x2.8x1.2cm with undermining at the 1-2 oclock position 4cm,           5.6x9.4cm covered with dry black eschar very painful heel.  Santyl and aquacel ag applied, heel padded with abd pad, wrapped lightly with kerlix            Dry black ulcers to dorsum right  great and 2nd toe with thick dry black ulcer 1.1x1cm plantar 2nd toe.  Cleaned and dried and applied Santyl, aquacel ag, wrapped with kerlix. Pressure relief boot on left foot bilateral feet elevated.  No pressure on left heel

## 2022-06-22 NOTE — PLAN OF CARE
[facility]  Initial Discharge Assessment       Primary Care Provider: Stefano Lopez MD    Admission Diagnosis: Diabetic foot ulcer [E11.621, L97.509]    Admission Date: 6/21/2022  Expected Discharge Date:      SW met with Pt at bedside to complete DC assessment. Pt AAOx4s. Demographics, PCP, and insurance verified. No current HH. Pt is a <30 day readmit. Pt receives dialysis MWF with [facility]. Pt reports ability to complete ADLs with the assistance of: a rollator, a glucometer, a raised toilet, the assistance of 1 other person for bathing and dressing. Pt verbalized plan to DC to SNF. SW to follow case.      Discharge Barriers Identified: None    Payor: HU[employer] MANAGED MEDICARE / Plan: HU[employer]NA MEDICARE PPO / Product Type: Medicare Advantage /     Extended Emergency Contact Information  Primary Emergency Contact: Donovan García  Address: [address] [place] [address] [place]  Mobile Phone: [phone]  Relation: Spouse  Preferred language: English   needed? No  Secondary Emergency Contact: Sonia Cota  Address: [address] [place] [address] [place]  Mobile Phone: [phone]  Relation: Mother  Preferred language: English   needed? No    Discharge Plan A: Skilled Nursing Facility  Discharge Plan B: Home with family      [facility] [address] [place]  [address]  [address]  [place] [address]  Phone: [phone] Fax: [phone]    [id number] [place] - [address]  [address]  [place] [address]  Phone: [phone] Fax: [phone]      Initial Assessment (most recent)     Adult Discharge Assessment - 06/22/22 1611        Discharge Assessment    Assessment Type Discharge Planning Assessment     Confirmed/corrected address, phone number and insurance Yes     Confirmed Demographics Correct on Facesheet     Source of Information  patient     Does patient/caregiver understand observation status Yes     Communicated ERI with patient/caregiver Yes     Lives With spouse     Facility Arrived From: --     Do you expect to return to your current living situation? No   Pt wants SNF in [place]    Do you have help at home or someone to help you manage your care at home? Yes     Who are your caregiver(s) and their phone number(s)? Donovan García (Spouse)   [phone] (Mobile)     Prior to hospitilization cognitive status: Alert/Oriented     Current cognitive status: Alert/Oriented     Walking or Climbing Stairs Difficulty ambulation difficulty, requires equipment     Mobility Management Rollator     Dressing/Bathing Difficulty bathing difficulty, requires equipment;bathing difficulty, assistance 1 person     Dressing/Bathing Management Spouse helps; uses raised toilet seat     Home Accessibility wheelchair accessible     Home Layout Able to live on 1st floor     Equipment Currently Used at Home rollator;raised toilet;glucometer     Readmission within 30 days? Yes     Patient currently being followed by outpatient case management? No     Do you currently have service(s) that help you manage your care at home? No     Do you take prescription medications? Yes     Do you have prescription coverage? Yes     Coverage HU[employer] MANAGED MEDICARE - [employer] MEDICARE PPO     Do you have any problems affording any of your prescribed medications? No     Is the patient taking medications as prescribed? yes     Who is going to help you get home at discharge? Donovan García (Spouse)   [phone] (Mobile)     How do you get to doctors appointments? family or friend will provide     Are you on dialysis? Yes     Dialysis Name and Scheduled days [facility]; MWF     Do you take coumadin? No     Discharge Plan A Skilled Nursing Facility     Discharge Plan B Home with family     DME Needed Upon Discharge  none     Discharge Plan discussed with: Patient     Name(s) and Number(s)  Donovan García (Spouse)   [phone] (Mobile)     Discharge Barriers Identified None        Relationship/Environment    Name(s) of Who Lives With Patient Donovan García (Spouse)   [phone] (Mobile)

## 2022-06-22 NOTE — PLAN OF CARE
Problem: Adult Inpatient Plan of Care  Goal: Plan of Care Review  Outcome: Ongoing, Progressing  Goal: Patient-Specific Goal (Individualized)  Outcome: Ongoing, Progressing  Goal: Absence of Hospital-Acquired Illness or Injury  Outcome: Ongoing, Progressing  Goal: Optimal Comfort and Wellbeing  Outcome: Ongoing, Progressing  Goal: Readiness for Transition of Care  Outcome: Ongoing, Progressing     Problem: Diabetes Comorbidity  Goal: Blood Glucose Level Within Targeted Range  Outcome: Ongoing, Progressing     Problem: Infection  Goal: Absence of Infection Signs and Symptoms  Outcome: Ongoing, Progressing     Problem: Impaired Wound Healing  Goal: Optimal Wound Healing  Outcome: Ongoing, Progressing     Problem: Skin Injury Risk Increased  Goal: Skin Health and Integrity  Outcome: Ongoing, Progressing     Problem: Device-Related Complication Risk (Hemodialysis)  Goal: Safe, Effective Therapy Delivery  Outcome: Ongoing, Progressing     Problem: Hemodynamic Instability (Hemodialysis)  Goal: Effective Tissue Perfusion  Outcome: Ongoing, Progressing     Problem: Infection (Hemodialysis)  Goal: Absence of Infection Signs and Symptoms  Outcome: Ongoing, Progressing       POC reviewed with pt. Pt verbalized understanding. Questions and concerns addressed. No acute events today. Pt progressing toward goals. Will continue to monitor. See flowsheets for full assessment and VS info.

## 2022-06-22 NOTE — PLAN OF CARE
Problem: Adult Inpatient Plan of Care  Goal: Plan of Care Review  Outcome: Ongoing, Progressing  Goal: Patient-Specific Goal (Individualized)  Outcome: Ongoing, Progressing  Goal: Absence of Hospital-Acquired Illness or Injury  Outcome: Ongoing, Progressing  Goal: Optimal Comfort and Wellbeing  Outcome: Ongoing, Progressing  Goal: Readiness for Transition of Care  Outcome: Ongoing, Progressing     Problem: Diabetes Comorbidity  Goal: Blood Glucose Level Within Targeted Range  Outcome: Ongoing, Progressing     Problem: Infection  Goal: Absence of Infection Signs and Symptoms  Outcome: Ongoing, Progressing     Problem: Impaired Wound Healing  Goal: Optimal Wound Healing  Outcome: Ongoing, Progressing     Problem: Skin Injury Risk Increased  Goal: Skin Health and Integrity  Outcome: Ongoing, Progressing

## 2022-06-22 NOTE — PLAN OF CARE
SW met with Pt at bedside to complete IMM. Pt was explained IMM. Pt verbalized understanding of IMM and signed. IMM scanned to .        06/22/22 1545   Medicare Message   Important Message from Medicare regarding Discharge Appeal Rights Explained to patient/caregiver;Signed/date by patient/caregiver   Date IMM was signed 06/22/22   Time IMM was signed 2830

## 2022-06-22 NOTE — CONSULTS
INPATIENT NEPHROLOGY CONSULT   [facility]    Patient Name: Leanna García  Date: 06/22/2022    Reason for consultation: ESRD    Chief Complaint:   Chief Complaint   Patient presents with    Foot Injury     Pt sent by MD for evaluation of her left foot.       History of Present Illness:  55 y/o F with ESRD on HD MWF, HTN, HLD, and DMII who was sent in by podiatry from [facility] for worsening ulceration and decreased blood flow to L foot with known diabetic wound despite IV antbx, recommending vascular eval and BKA, consulted for dialysis.    Interval History:  6/22- off floor    Plan of Care:    Assessment:  ESRD on HD MWF  HTN  SHPT  Anemia of CKD  Diabetic foot ulcer    Plan:    - Ordered HD MWF.   - Ordered UF to dry weight.  - Advise renal diet, 1.5L fluid restriction.  - Ordered FERMÍN with HD.  - Dose antbx for CrCl < 10/HD.    Thank you for allowing us to participate in this patient's care. We will continue to follow.    Vital Signs:  Temp Readings from Last 3 Encounters:   06/22/22 98.1 °F (36.7 °C) (Oral)   06/21/22 98.4 °F (36.9 °C)   06/14/22 97.9 °F (36.6 °C)       Pulse Readings from Last 3 Encounters:   06/22/22 60   06/21/22 76   06/14/22 73       BP Readings from Last 3 Encounters:   06/22/22 (!) 149/59   06/21/22 (!) 154/84   06/14/22 138/64       Weight:  Wt Readings from Last 3 Encounters:   06/21/22 89.1 kg (196 lb 6.9 oz)   06/13/22 89.9 kg (198 lb 3.1 oz)   06/02/22 86.2 kg (190 lb)       Past Medical & Surgical History:  Past Medical History:   Diagnosis Date    A-fib     resolved per patient    Arthritis     Bronchitis     Cardiovascular event risk, ASCVD 10-year risk 6.7% 10/15/2016    Diabetes mellitus     Diabetes mellitus type II     Diabetic ulcer of left midfoot 5/5/2022    Disorder of kidney and ureter     Encounter for blood transfusion     ESRD (end stage renal disease) 5/18/2018    MANJINDER (generalized anxiety disorder) 10/25/2016    History of colon  polyps 11/02/2016    Hyperlipidemia     Hypertension     Stroke        Past Surgical History:   Procedure Laterality Date    COLONOSCOPY N/A 10/5/2016    Procedure: COLONOSCOPY;  Surgeon: Pillo Chanel MD;  Location: [facility] ENDO;  Service: Endoscopy;  Laterality: N/A;    COLONOSCOPY N/A 4/26/2022    Procedure: COLONOSCOPY;  Surgeon: Saravanan Rachel MD;  Location: [facility] ENDO;  Service: Endoscopy;  Laterality: N/A;    HERNIA REPAIR Bilateral 11/22/2016    inguinal    HYSTERECTOMY      INCISION AND DRAINAGE FOOT Left 5/13/2022    Procedure: INCISION AND DRAINAGE, FOOT;  Surgeon: Ricardo Bruno DPM;  Location: [facility] OR;  Service: Podiatry;  Laterality: Left;    OOPHORECTOMY         Past Social History:  Social History     Socioeconomic History    Marital status:    Tobacco Use    Smoking status: Never Smoker    Smokeless tobacco: Never Used   Substance and Sexual Activity    Alcohol use: No    Drug use: No    Sexual activity: Yes     Partners: Male   Social History Narrative    Caregiver        Medications:  Scheduled Meds:   amLODIPine  10 mg Oral Daily    ampicillin-sulbactim (UNASYN) IVPB  3 g Intravenous Q12H    aspirin  81 mg Oral Daily    atorvastatin  80 mg Oral QHS    ciprofloxacin HCl  500 mg Oral Daily    collagenase   Topical (Top) Daily    DAPTOmycin (CUBICIN)  IV  500 mg Intravenous Q48H    enoxaparin  30 mg Subcutaneous Daily    gabapentin  100 mg Oral BID    hydrALAZINE  50 mg Oral Q12H    insulin detemir U-100  15 Units Subcutaneous QHS    lactulose  20 g Oral BID    losartan  100 mg Oral Daily    mupirocin   Nasal BID     Continuous Infusions:  PRN Meds:.dextrose 50%, dextrose 50%, glucagon (human recombinant), glucose, glucose, hydrALAZINE, ibuprofen, insulin aspart U-100, morphine, ondansetron, sodium chloride 0.9%  No current facility-administered medications on file prior to encounter.     Current Outpatient Medications on File Prior to Encounter    Medication Sig Dispense Refill    amLODIPine (NORVASC) 10 MG tablet Take 1 tablet (10 mg total) by mouth once daily.      aspirin (ECOTRIN) 81 MG EC tablet Take 81 mg by mouth once daily.      azelastine (ASTELIN) 137 mcg (0.1 %) nasal spray 1 spray by Nasal route 2 (two) times a day.      blood sugar diagnostic Strp To check BG 4 times daily, to use with insurance preferred meter (Patient taking differently: 1 strip by Misc.(Non-Drug; Combo Route) route 4 (four) times daily. To check BG 4 times daily, to use with insurance preferred meter) 450 strip 3    calcium acetate,phosphat bind, (PHOSLO) 667 mg capsule Take 1 capsule (667 mg total) by mouth 3 (three) times daily with meals.      cetirizine (ZYRTEC) 10 MG tablet Take 1 tablet (10 mg total) by mouth every other day.      ciprofloxacin HCl (CIPRO) 500 MG tablet Take 1 tablet (500 mg total) by mouth once daily. On dialysis days give after HD. Last dose 6/29/22      fluticasone propionate (FLONASE) 50 mcg/actuation nasal spray 2 sprays (100 mcg total) by Each Nostril route once daily.      gabapentin (NEURONTIN) 100 MG capsule Take 1 capsule (100 mg total) by mouth 2 (two) times daily.      hydrALAZINE (APRESOLINE) 50 MG tablet Take 1 tablet (50 mg total) by mouth every 12 (twelve) hours.      HYDROcodone-acetaminophen (NORCO) 5-325 mg per tablet Take 1 tablet by mouth every 6 (six) hours as needed. 15 tablet 0    insulin aspart U-100 (NOVOLOG FLEXPEN U-100 INSULIN) 100 unit/mL (3 mL) InPn pen Inject 5-8 units 3 times per day with food. 1 Box 6    insulin detemir U-100 (LEVEMIR FLEXTOUCH U-100 INSULN) 100 unit/mL (3 mL) InPn pen Inject 15 Units into the skin every evening.      lactulose (CHRONULAC) 10 gram/15 mL solution Take 20 g by mouth 2 (two) times a day.      lancets Misc To use to check blood sugar 4 times daily. To use with insurance approved meter. Patient needs the round, purple one (Patient taking differently: 1 lancet by  "Misc.(Non-Drug; Combo Route) route 4 (four) times daily. To use to check blood sugar 4 times daily. To use with insurance approved meter. Patient needs the round, purple one) 450 each 3    losartan (COZAAR) 100 MG tablet Take 1 tablet (100 mg total) by mouth once daily.      multivitamin (THERAGRAN) per tablet Take 1 tablet by mouth once daily.      oxybutynin (DITROPAN) 5 MG Tab Take 1 tablet (5 mg total) by mouth 3 (three) times daily.      pen needle, diabetic (BD ULTRA-FINE ROBERT PEN NEEDLE) 32 gauge x 5/32" Ndle To use 4 times per day with insulin injections. (Patient taking differently: 1 pen by Misc.(Non-Drug; Combo Route) route 4 (four) times daily. To use 4 times per day with insulin injections.) 450 each 2    polyethylene glycol (GLYCOLAX) 17 gram PwPk Take 17 g by mouth 2 (two) times daily as needed.      sodium chloride 0.9% SolP 50 mL with DAPTOmycin 500 mg SolR 500 mg Inject 500 mg into the vein every 48 hours. Until 6/29/22      [DISCONTINUED] atorvastatin (LIPITOR) 80 MG tablet Take 1 tablet (80 mg total) by mouth once daily. (Patient taking differently: Take 80 mg by mouth every evening.) 90 tablet 3    [DISCONTINUED] blood-glucose meter (ACCU-CHEK KAYLIE PLUS METER) Misc To use to check blood sugars 4 times a day 1 each 0    [DISCONTINUED] clorazepate (TRANXENE) 3.75 MG Tab Take 7.5 mg by mouth nightly as needed.       [DISCONTINUED] dextrose (GLUCOSE GEL) 40 % gel Take 37.5 mLs (15,000 mg total) by mouth once as needed (hypoglycemia). 37.5 g 4    [DISCONTINUED] ezetimibe (ZETIA) 10 mg tablet Take 1 tablet (10 mg total) by mouth once daily.      [DISCONTINUED] fenofibrate 160 MG Tab Take 1 tablet (160 mg total) by mouth once daily. 90 tablet 3    [DISCONTINUED] lancing device with lancets (ACCU-CHEK SOFT DEV LANCETS) Kit To check blood sugars 4 times per day 400 each 3    [DISCONTINUED] pantoprazole (PROTONIX) 40 MG tablet Take 1 tablet (40 mg total) by mouth once daily.   "       Allergies:  Dilaudid [hydromorphone], Vancomycin, Chlorhexidine, and Lortab [hydrocodone-acetaminophen]    Past Family History:  Reviewed; refer to Hospitalist Admission Note    Review of Systems:  Off floor    Physical Exam:  Off floor    Results:  Lab Results   Component Value Date     06/22/2022    K 4.6 06/22/2022    CL 98 06/22/2022    CO2 30 (H) 06/22/2022    BUN 57 (H) 06/22/2022    CREATININE 7.6 (H) 06/22/2022    CALCIUM 9.4 06/22/2022    ANIONGAP 11 06/22/2022    ESTGFRAFRICA 6.3 (A) 06/22/2022    EGFRNONAA 5.4 (A) 06/22/2022       Lab Results   Component Value Date    CALCIUM 9.4 06/22/2022    PHOS 5.9 (H) 05/25/2022       Recent Labs   Lab 06/22/22  0618   WBC 8.67   RBC 3.31*   HGB 8.6*   HCT 28.9*      MCV 87   MCH 26.0*   MCHC 29.8*       I have personally reviewed pertinent radiological imaging and reports.    I have spent > 70 minutes providing care for this patient for the above diagnoses. These services have included chart/data/imaging review, evaluation, exam, formulation of plan, , note preparation, and discussions with staff involved in this patient's care.    Ashleigh Soria MD MPH  [facility]  [address] [place] [id number]  [phone] (p)  [phone] (f)

## 2022-06-22 NOTE — PROGRESS NOTES
Consent and Hep B status verified, removed 2000 uf net, no issues with access , post thrill and bruit noted, patient stable throughout treatment. Report given to Esther      06/22/22 1250   Handoff Report   Received From Karina   Given To Esther   Vital Signs   Temp 98.1 °F (36.7 °C)   Temp src Oral   Pulse 68   Heart Rate Source Monitor   Resp 18   SpO2 98 %   Pulse Oximetry Type Intermittent   O2 Device (Oxygen Therapy) room air   BP (!) 130/57   BP Location Right arm   BP Method Automatic   Patient Position Lying   Assessments (Pre/Post)   Consent Obtained yes   Safety vein preservation armband present   Date Hepatitis Profile Obtained 03/10/22   Blood Liters Processed (BLP) 65.1   Transport Modality bed   Level of Consciousness (AVPU) alert   Dialyzer Clearance mildly streaked   Pain   Preferred Pain Scale number (Numeric Rating Pain Scale)   Comfort/Acceptable Pain Level 0   Pain Body Location - Side Left   Pain Body Location - Orientation lower   Pain Body Location foot   Pain Rating (0-10): Rest 10   Pain Rating (0-10): Activity 10   Pain Management Interventions quiet environment facilitated   Pain/Comfort Interventions   Fever Reduction/Comfort Measures lightweight bedding;lightweight clothing   PRN Med Reassessment   PRN Med Reassessment (Excludes Pain Medications) Mild relief obtained   Pre-Hemodialysis Assessment   Additional Dialysis Information Needed Yes   Patient Status Departed   Treatment Consent Verified Yes        Hemodialysis AV Fistula Left upper arm   No Placement Date or Time found.   Present Prior to Hospital Arrival?: Yes  Location: Left upper arm   Site Assessment Clean;Dry;Intact   Patency Present;Thrill;Bruit   Status Deaccessed   Dressing Intervention First dressing   Dressing Status Clean;Dry;Intact   Site Condition No complications   Dressing Gauze   Post-Hemodialysis Assessment   Rinseback Volume (mL) 250 mL   Blood Volume Processed (Liters) 65.1 L   Dialyzer Clearance Lightly  streaked   Duration of Treatment 180 minutes   Additional Fluid Intake (mL) 500 mL   Total UF (mL) 2500 mL   Net Fluid Removal 2000   Patient Response to Treatment debbi   Post-Treatment Weight 87.1 kg (192 lb 0.3 oz)   Treatment Weight Change -2   Arterial bleeding stop time (min) 5 min   Venous bleeding stop time (min) 5 min   Post-Hemodialysis Comments stable   Edema   Edema foot, left

## 2022-06-22 NOTE — PROGRESS NOTES
[facility]    Progress Note    Patient Name: Lenana García  MRN: [id number]  Patient Class: IP- Inpatient   Admission Date: 6/21/2022 10:04 AM  Length of Stay: 2  Attending Physician: Conchita Douglas MD  Primary Care Provider: Stefano Lopez MD  Face-to-Face encounter date: 06/23/2022  Code status:  Chief Complaint: Foot Injury (Pt sent by MD for evaluation of her left foot.)        Subjective:    HPI:Leanna García is a 56 y.o.  female who  has a past medical history of A-fib, Arthritis, Bronchitis, Cardiovascular event risk, ASCVD 10-year risk 6.7% (10/15/2016), Diabetes mellitus, Diabetes mellitus type II, Diabetic ulcer of left midfoot (5/5/2022), Disorder of kidney and ureter, Encounter for blood transfusion, ESRD (end stage renal disease) (5/18/2018), MANJINDER (generalized anxiety disorder) (10/25/2016), History of colon polyps (11/02/2016), Hyperlipidemia, Hypertension, and Stroke.. The patient presented to [facility] on 6/21/2022 with a primary complaint of Foot Injury (Pt sent by MD for evaluation of her left foot.)  Patient is currently undergoing treat at [facility] and followed up with podiatrist today.  Podiatry evaluated patient and referred patient to the ED on account of worsening diabetic foot ulcer.     Interval History:   6/22: Patient is doing well. Awaiting vascular consult. No concerns/issues overnight reported by the patient or the nursing staff.    Review of Systems All other Review of Systems were found to be negative expect for that mentioned already in HPI.     Objective:     Vitals:    06/22/22 1620 06/22/22 1933 06/22/22 2342 06/23/22 0350   BP: (!) 163/76 (!) 155/84 (!) 147/65 129/69   BP Location:  Right arm Right arm Right arm   Patient Position:  Sitting Sitting Lying   Pulse: 67 73 63 60   Resp: 19 20 20 18   Temp: 97.8 °F (36.6 °C) 98.8 °F (37.1 °C) 98.4 °F (36.9 °C) 98.4 °F (36.9 °C)   TempSrc: Oral Oral Oral Oral    SpO2: 97% 96% 96% 95%   Weight:       Height:            Vitals reviewed.  Constitutional: No distress.   HENT: NC  Head: Atraumatic.   Cardiovascular: Normal rate, regular rhythm and normal heart sounds.   Pulmonary/Chest: Effort normal. No wheezes.   Abdominal: Soft. Bowel sounds are normal. No distension and no mass. No tenderness  Neurological: Alert.   Skin: Skin is warm and dry.   Psych: Appropriate mood and affect    Following labs were Reviewed   CBC:  No results for input(s): WBC, HGB, HCT, PLT in the last 24 hours.  CMP:  Recent Labs   Lab 06/23/22  0521   CALCIUM 9.2      K 4.7   CO2 25      BUN 34*   CREATININE 5.7*       Micro Results  Microbiology Results (last 7 days)     Procedure Component Value Units Date/Time    Blood culture #1 **CANNOT BE ORDERED STAT** [[id number]] Collected: 06/21/22 1143    Order Status: Completed Specimen: Blood from Peripheral, Wrist, Right Updated: 06/22/22 1232     Blood Culture, Routine No Growth to date      No Growth to date    Blood culture #2 **CANNOT BE ORDERED STAT** [[id number]] Collected: 06/21/22 1155    Order Status: Completed Specimen: Blood from Peripheral, Antecubital, Right Updated: 06/22/22 1232     Blood Culture, Routine No Growth to date      No Growth to date    Aerobic culture [[id number]]     Order Status: No result Specimen: Wound            Radiology Reports  X-Ray Chest 1 View  Result Date: 6/3/2022  No acute process Electronically signed by: Neto Bustillos MD Date:    06/03/2022 Time:    09:25    X-Ray Foot Complete Bilateral  Result Date: 6/21/2022  IMPRESSION: 1.  Bilateral degenerative changes and pes planus as described. 2.  No destructive osseous lesions observed. 3.  Ulcer in the plantar soft tissues of the left foot. 4.  Marked bilateral atherosclerosis. 5.  Amputation an osteotomy of the left fourth digit. Electronically signed by:  Kiel Garvey DO  6/21/2022 11:30 AM CDT Workstation: [id number]    MRI Foot (Forefoot) Left  Without Contrast  Result Date: 6/8/2022  Poor quality MRI due to patient motion.  While there remains an ulceration in the plantar medial soft tissues a focal abscess is not seen.  Osteomyelitis is not identified. Electronically signed by: Jose De León MD Date:    06/08/2022 Time:    18:16       Meds  Scheduled Meds:   amLODIPine  10 mg Oral Daily    ampicillin-sulbactim (UNASYN) IVPB  3 g Intravenous Q12H    aspirin  81 mg Oral Daily    atorvastatin  80 mg Oral QHS    ciprofloxacin HCl  500 mg Oral Daily    collagenase   Topical (Top) Daily    DAPTOmycin (CUBICIN)  IV  500 mg Intravenous Q48H    enoxaparin  30 mg Subcutaneous Daily    epoetin chris-ebpx (RETACRIT) injection  50 Units/kg Intravenous Every Mon, Wed, Fri    gabapentin  100 mg Oral BID    hydrALAZINE  50 mg Oral Q12H    insulin detemir U-100  15 Units Subcutaneous QHS    lactulose  20 g Oral BID    losartan  100 mg Oral Daily    mupirocin   Nasal BID     Continuous Infusions:  PRN Meds:.dextrose 50%, dextrose 50%, glucagon (human recombinant), glucose, glucose, hydrALAZINE, ibuprofen, insulin aspart U-100, morphine, ondansetron, sodium chloride 0.9%.    Assessment & Plan:      *Diabetic ulcer of left midfoot  Morphine 2mg q.4h p.r.n. for pain  Continue IV daptomycin and oral Cipro patient was on prior  Blood cultures  Consult to Podiatry, Wound Care, ID, Vascular surg  MRI done 2 week back unremarkable for osteo. Repeat?  Wound care and cultures       Type 2 diabetes mellitus with kidney complication, with long-term current use of insulin  Continue Levemir 15 units nightly  Insulin sliding scale  Regular accuchek       ESRD (end stage renal disease)  Consult nephrology for dialysis       Hypertension associated with diabetes  Resume home medication of hydralazine 50mg BID, losartan 100mg, amlodipine 10mg daily and titrate upwards as needed      PAD  Consult vascular surgeon  Atorvastatin 40 mg daily  Aspirin 81 mg daily                Discharge Planning:   Is the patient medically ready for discharge?: no    Reason for patient still in hospital (select all that apply): Patient trending condition and Treatment    Above encounter included review of the medical records, interviewing and examining the patient face-to-face, discussion with family and other health care providers, ordering and interpreting lab/test results and formulating a plan of care.     Medical Decision Making:      [_] Low Complexity  [_] Moderate Complexity  [x] High Complexity      Conchita Douglas MD  Department of Hospital Medicine   [facility]

## 2022-06-23 LAB
ANION GAP SERPL CALC-SCNC: 9 MMOL/L (ref 8–16)
BUN SERPL-MCNC: 34 MG/DL (ref 6–20)
CALCIUM SERPL-MCNC: 9.2 MG/DL (ref 8.7–10.5)
CHLORIDE SERPL-SCNC: 102 MMOL/L (ref 95–110)
CO2 SERPL-SCNC: 25 MMOL/L (ref 23–29)
CREAT SERPL-MCNC: 5.7 MG/DL (ref 0.5–1.4)
CRP SERPL-MCNC: 4.68 MG/DL
EST. GFR  (AFRICAN AMERICAN): 8.9 ML/MIN/1.73 M^2
EST. GFR  (NON AFRICAN AMERICAN): 7.7 ML/MIN/1.73 M^2
GLUCOSE SERPL-MCNC: 101 MG/DL (ref 70–110)
GLUCOSE SERPL-MCNC: 112 MG/DL (ref 70–110)
GLUCOSE SERPL-MCNC: 119 MG/DL (ref 70–110)
GLUCOSE SERPL-MCNC: 132 MG/DL (ref 70–110)
GLUCOSE SERPL-MCNC: 201 MG/DL (ref 70–110)
POTASSIUM SERPL-SCNC: 4.7 MMOL/L (ref 3.5–5.1)
SODIUM SERPL-SCNC: 136 MMOL/L (ref 136–145)

## 2022-06-23 PROCEDURE — 86140 C-REACTIVE PROTEIN: CPT | Performed by: STUDENT IN AN ORGANIZED HEALTH CARE EDUCATION/TRAINING PROGRAM

## 2022-06-23 PROCEDURE — 80048 BASIC METABOLIC PNL TOTAL CA: CPT | Performed by: STUDENT IN AN ORGANIZED HEALTH CARE EDUCATION/TRAINING PROGRAM

## 2022-06-23 PROCEDURE — 99231 PR SUBSEQUENT HOSPITAL CARE,LEVL I: ICD-10-PCS | Mod: ,,, | Performed by: INTERNAL MEDICINE

## 2022-06-23 PROCEDURE — 12000002 HC ACUTE/MED SURGE SEMI-PRIVATE ROOM

## 2022-06-23 PROCEDURE — 36415 COLL VENOUS BLD VENIPUNCTURE: CPT | Performed by: STUDENT IN AN ORGANIZED HEALTH CARE EDUCATION/TRAINING PROGRAM

## 2022-06-23 PROCEDURE — 99232 SBSQ HOSP IP/OBS MODERATE 35: CPT | Mod: ,,, | Performed by: PODIATRIST

## 2022-06-23 PROCEDURE — 99232 PR SUBSEQUENT HOSPITAL CARE,LEVL II: ICD-10-PCS | Mod: ,,, | Performed by: PODIATRIST

## 2022-06-23 PROCEDURE — 63600175 PHARM REV CODE 636 W HCPCS: Performed by: STUDENT IN AN ORGANIZED HEALTH CARE EDUCATION/TRAINING PROGRAM

## 2022-06-23 PROCEDURE — 99231 SBSQ HOSP IP/OBS SF/LOW 25: CPT | Mod: ,,, | Performed by: INTERNAL MEDICINE

## 2022-06-23 PROCEDURE — 25000003 PHARM REV CODE 250: Performed by: INTERNAL MEDICINE

## 2022-06-23 PROCEDURE — 25000003 PHARM REV CODE 250: Performed by: STUDENT IN AN ORGANIZED HEALTH CARE EDUCATION/TRAINING PROGRAM

## 2022-06-23 PROCEDURE — 63600175 PHARM REV CODE 636 W HCPCS: Performed by: INTERNAL MEDICINE

## 2022-06-23 RX ADMIN — LACTULOSE 20 G: 20 SOLUTION ORAL at 08:06

## 2022-06-23 RX ADMIN — GABAPENTIN 100 MG: 100 CAPSULE ORAL at 08:06

## 2022-06-23 RX ADMIN — DAPTOMYCIN 500 MG: 500 INJECTION, POWDER, LYOPHILIZED, FOR SOLUTION INTRAVENOUS at 08:06

## 2022-06-23 RX ADMIN — AMLODIPINE BESYLATE 10 MG: 5 TABLET ORAL at 08:06

## 2022-06-23 RX ADMIN — HYDRALAZINE HYDROCHLORIDE 50 MG: 25 TABLET ORAL at 08:06

## 2022-06-23 RX ADMIN — ENOXAPARIN SODIUM 30 MG: 30 INJECTION SUBCUTANEOUS at 05:06

## 2022-06-23 RX ADMIN — CIPROFLOXACIN 500 MG: 500 TABLET, FILM COATED ORAL at 08:06

## 2022-06-23 RX ADMIN — AMPICILLIN SODIUM AND SULBACTAM SODIUM 3 G: 2; 1 INJECTION, POWDER, FOR SOLUTION INTRAMUSCULAR; INTRAVENOUS at 10:06

## 2022-06-23 RX ADMIN — ATORVASTATIN CALCIUM 80 MG: 40 TABLET, FILM COATED ORAL at 08:06

## 2022-06-23 RX ADMIN — INSULIN DETEMIR 15 UNITS: 100 INJECTION, SOLUTION SUBCUTANEOUS at 08:06

## 2022-06-23 RX ADMIN — MUPIROCIN: 20 OINTMENT TOPICAL at 08:06

## 2022-06-23 RX ADMIN — ASPIRIN 81 MG: 81 TABLET, COATED ORAL at 08:06

## 2022-06-23 RX ADMIN — LOSARTAN POTASSIUM 100 MG: 50 TABLET, FILM COATED ORAL at 08:06

## 2022-06-23 RX ADMIN — HYDRALAZINE HYDROCHLORIDE 10 MG: 20 INJECTION INTRAMUSCULAR; INTRAVENOUS at 11:06

## 2022-06-23 RX ADMIN — MORPHINE SULFATE 2 MG: 2 INJECTION, SOLUTION INTRAMUSCULAR; INTRAVENOUS at 11:06

## 2022-06-23 RX ADMIN — INSULIN ASPART 2 UNITS: 100 INJECTION, SOLUTION INTRAVENOUS; SUBCUTANEOUS at 08:06

## 2022-06-23 NOTE — SUBJECTIVE & OBJECTIVE
Subjective:     Interval History: patient resting in bed. Denies pain to either extremity. Addressed all concerns and questions regarding possible amputation. Discussed we will try everything to save her foot prior to amputation, which is her wishes. Therefore, no surgical intervention planned by podiatry at this time.       Wound vac in place.  Patient did not have her heels floated, which is necessary to remove as much pressure possible from her heel wound.   States she will drink vicky and glucerna when given to her.        Awaiting vascular intervention.       Scheduled Meds:   amLODIPine  10 mg Oral Daily    ampicillin-sulbactim (UNASYN) IVPB  3 g Intravenous Q12H    aspirin  81 mg Oral Daily    atorvastatin  80 mg Oral QHS    ciprofloxacin HCl  500 mg Oral Daily    collagenase   Topical (Top) Daily    DAPTOmycin (CUBICIN)  IV  500 mg Intravenous Q48H    enoxaparin  30 mg Subcutaneous Daily    epoetin chris-ebpx (RETACRIT) injection  50 Units/kg Intravenous Every Mon, Wed, Fri    gabapentin  100 mg Oral BID    hydrALAZINE  50 mg Oral Q12H    insulin detemir U-100  15 Units Subcutaneous QHS    lactulose  20 g Oral BID    losartan  100 mg Oral Daily    mupirocin   Nasal BID     Continuous Infusions:  PRN Meds:dextrose 50%, dextrose 50%, glucagon (human recombinant), glucose, glucose, hydrALAZINE, ibuprofen, insulin aspart U-100, morphine, ondansetron, sodium chloride 0.9%    Review of Systems  Objective:     Vital Signs (Most Recent):  Temp: 98.5 °F (36.9 °C) (06/23/22 1712)  Pulse: 74 (06/23/22 1712)  Resp: 18 (06/23/22 1712)  BP: (!) 141/68 (06/23/22 1721)  SpO2: (!) 94 % (06/23/22 1721)   Vital Signs (24h Range):  Temp:  [98.4 °F (36.9 °C)-98.8 °F (37.1 °C)] 98.5 °F (36.9 °C)  Pulse:  [60-74] 74  Resp:  [18-20] 18  SpO2:  [92 %-96 %] 94 %  BP: ()/(58-89) 141/68     Weight: 89.1 kg (196 lb 6.9 oz)  Body mass index is 29.87 kg/m².    Foot Exam                                  Laboratory:  All pertinent  labs reviewed within the last 24 hours.    Diagnostic Results:  I have reviewed all pertinent imaging results/findings within the past 24 hours.

## 2022-06-23 NOTE — PROGRESS NOTES
[facility]  Podiatry  Progress Note    Patient Name: Leanna García  MRN: [id number]  Admission Date: 6/21/2022  Hospital Length of Stay: 2 days  Attending Physician: Conchita Douglas MD  Primary Care Provider: Stefano Lopez MD     Subjective:     Interval History: patient resting in bed. Denies pain to either extremity. Addressed all concerns and questions regarding possible amputation. Discussed we will try everything to save her foot prior to amputation, which is her wishes. Therefore, no surgical intervention planned by podiatry at this time.       Wound vac in place.  Patient did not have her heels floated, which is necessary to remove as much pressure possible from her heel wound.   States she will drink vicky and glucerna when given to her.        Awaiting vascular intervention.       Scheduled Meds:   amLODIPine  10 mg Oral Daily    ampicillin-sulbactim (UNASYN) IVPB  3 g Intravenous Q12H    aspirin  81 mg Oral Daily    atorvastatin  80 mg Oral QHS    ciprofloxacin HCl  500 mg Oral Daily    collagenase   Topical (Top) Daily    DAPTOmycin (CUBICIN)  IV  500 mg Intravenous Q48H    enoxaparin  30 mg Subcutaneous Daily    epoetin chris-ebpx (RETACRIT) injection  50 Units/kg Intravenous Every Mon, Wed, Fri    gabapentin  100 mg Oral BID    hydrALAZINE  50 mg Oral Q12H    insulin detemir U-100  15 Units Subcutaneous QHS    lactulose  20 g Oral BID    losartan  100 mg Oral Daily    mupirocin   Nasal BID     Continuous Infusions:  PRN Meds:dextrose 50%, dextrose 50%, glucagon (human recombinant), glucose, glucose, hydrALAZINE, ibuprofen, insulin aspart U-100, morphine, ondansetron, sodium chloride 0.9%    Review of Systems  Objective:     Vital Signs (Most Recent):  Temp: 98.5 °F (36.9 °C) (06/23/22 1712)  Pulse: 74 (06/23/22 1712)  Resp: 18 (06/23/22 1712)  BP: (!) 141/68 (06/23/22 1721)  SpO2: (!) 94 % (06/23/22 1721)   Vital Signs (24h Range):  Temp:  [98.4 °F (36.9 °C)-98.8 °F  (37.1 °C)] 98.5 °F (36.9 °C)  Pulse:  [60-74] 74  Resp:  [18-20] 18  SpO2:  [92 %-96 %] 94 %  BP: ()/(58-89) 141/68     Weight: 89.1 kg (196 lb 6.9 oz)  Body mass index is 29.87 kg/m².    Foot Exam                                  Laboratory:  All pertinent labs reviewed within the last 24 hours.    Diagnostic Results:  I have reviewed all pertinent imaging results/findings within the past 24 hours.    Assessment/Plan:     * Diabetic ulcer of left midfoot  Vascular intervention- awaiting intervention and recommendations regarding blood flow to her foot and whether she would  Heal a toe amputation if it becomes necessary in the future      No surgical intervention planned by podiatry    Continue would vac on left foot  Continue dressing changes with aquacel ag on remaining wounds    Float heels at all times  Pillow boots as well      vicky and glucerna daily- a1c is much improved!          Alivia Bruno DPM  Podiatry  [facility]

## 2022-06-23 NOTE — PLAN OF CARE
Problem: Adult Inpatient Plan of Care  Goal: Plan of Care Review  Outcome: Ongoing, Progressing  Goal: Patient-Specific Goal (Individualized)  Outcome: Ongoing, Progressing  Goal: Absence of Hospital-Acquired Illness or Injury  Outcome: Ongoing, Progressing  Goal: Optimal Comfort and Wellbeing  Outcome: Ongoing, Progressing  Goal: Readiness for Transition of Care  Outcome: Ongoing, Progressing     Problem: Diabetes Comorbidity  Goal: Blood Glucose Level Within Targeted Range  Outcome: Ongoing, Progressing     Problem: Infection  Goal: Absence of Infection Signs and Symptoms  Outcome: Ongoing, Progressing     Problem: Impaired Wound Healing  Goal: Optimal Wound Healing  Outcome: Ongoing, Progressing     Problem: Skin Injury Risk Increased  Goal: Skin Health and Integrity  Outcome: Ongoing, Progressing     Problem: Device-Related Complication Risk (Hemodialysis)  Goal: Safe, Effective Therapy Delivery  Outcome: Ongoing, Progressing     Problem: Hemodynamic Instability (Hemodialysis)  Goal: Effective Tissue Perfusion  Outcome: Ongoing, Progressing     Problem: Infection (Hemodialysis)  Goal: Absence of Infection Signs and Symptoms  Outcome: Ongoing, Progressing

## 2022-06-23 NOTE — H&P (VIEW-ONLY)
[facility]  Vascular Surgery  Consult Note    Consults  Subjective:     Chief Complaint/Reason for Admission:  Left foot wound    History of Present Illness:  56-year-old female with long history of diabetes.  Has developed a left heel and lateral foot wounds over the several weeks.  She is unsure how the started but they have progressed despite wound care and antibiotics.  She had arterial duplex ultrasound which did not show any significant large vessel disease in the lower extremity.  She is on dialysis.    Medications Prior to Admission   Medication Sig Dispense Refill Last Dose    amLODIPine (NORVASC) 10 MG tablet Take 1 tablet (10 mg total) by mouth once daily.   Unknown at Unknown time    aspirin (ECOTRIN) 81 MG EC tablet Take 81 mg by mouth once daily.   Unknown at Unknown time    azelastine (ASTELIN) 137 mcg (0.1 %) nasal spray 1 spray by Nasal route 2 (two) times a day.   Unknown at Unknown time    blood sugar diagnostic Strp To check BG 4 times daily, to use with insurance preferred meter (Patient taking differently: 1 strip by Misc.(Non-Drug; Combo Route) route 4 (four) times daily. To check BG 4 times daily, to use with insurance preferred meter) 450 strip 3     calcium acetate,phosphat bind, (PHOSLO) 667 mg capsule Take 1 capsule (667 mg total) by mouth 3 (three) times daily with meals.   Unknown at Unknown time    cetirizine (ZYRTEC) 10 MG tablet Take 1 tablet (10 mg total) by mouth every other day.   Unknown at Unknown time    ciprofloxacin HCl (CIPRO) 500 MG tablet Take 1 tablet (500 mg total) by mouth once daily. On dialysis days give after HD. Last dose 6/29/22   Unknown at Unknown time    fluticasone propionate (FLONASE) 50 mcg/actuation nasal spray 2 sprays (100 mcg total) by Each Nostril route once daily.   Unknown at Unknown time    gabapentin (NEURONTIN) 100 MG capsule Take 1 capsule (100 mg total) by mouth 2 (two) times daily.   Unknown at Unknown time    hydrALAZINE  "(APRESOLINE) 50 MG tablet Take 1 tablet (50 mg total) by mouth every 12 (twelve) hours.   Unknown at Unknown time    HYDROcodone-acetaminophen (NORCO) 5-325 mg per tablet Take 1 tablet by mouth every 6 (six) hours as needed. 15 tablet 0 Unknown at Unknown time    insulin aspart U-100 (NOVOLOG FLEXPEN U-100 INSULIN) 100 unit/mL (3 mL) InPn pen Inject 5-8 units 3 times per day with food. 1 Box 6 Unknown at Unknown time    insulin detemir U-100 (LEVEMIR FLEXTOUCH U-100 INSULN) 100 unit/mL (3 mL) InPn pen Inject 15 Units into the skin every evening.   Unknown at Unknown time    lactulose (CHRONULAC) 10 gram/15 mL solution Take 20 g by mouth 2 (two) times a day.   Unknown at Unknown time    lancets Misc To use to check blood sugar 4 times daily. To use with insurance approved meter. Patient needs the round, purple one (Patient taking differently: 1 lancet by Misc.(Non-Drug; Combo Route) route 4 (four) times daily. To use to check blood sugar 4 times daily. To use with insurance approved meter. Patient needs the round, purple one) 450 each 3     losartan (COZAAR) 100 MG tablet Take 1 tablet (100 mg total) by mouth once daily.   Unknown at Unknown time    multivitamin (THERAGRAN) per tablet Take 1 tablet by mouth once daily.   Unknown at Unknown time    oxybutynin (DITROPAN) 5 MG Tab Take 1 tablet (5 mg total) by mouth 3 (three) times daily.   Unknown at Unknown time    pen needle, diabetic (BD ULTRA-FINE ROBERT PEN NEEDLE) 32 gauge x 5/32" Ndle To use 4 times per day with insulin injections. (Patient taking differently: 1 pen by Misc.(Non-Drug; Combo Route) route 4 (four) times daily. To use 4 times per day with insulin injections.) 450 each 2     polyethylene glycol (GLYCOLAX) 17 gram PwPk Take 17 g by mouth 2 (two) times daily as needed.   Unknown at Unknown time    sodium chloride 0.9% SolP 50 mL with DAPTOmycin 500 mg SolR 500 mg Inject 500 mg into the vein every 48 hours. Until 6/29/22   Unknown at Unknown " time       Review of patient's allergies indicates:   Allergen Reactions    Dilaudid [hydromorphone] Nausea And Vomiting    Vancomycin Itching    Chlorhexidine Itching    Lortab [hydrocodone-acetaminophen] Itching       Past Medical History:   Diagnosis Date    A-fib     resolved per patient    Arthritis     Bronchitis     Cardiovascular event risk, ASCVD 10-year risk 6.7% 10/15/2016    Diabetes mellitus     Diabetes mellitus type II     Diabetic ulcer of left midfoot 5/5/2022    Disorder of kidney and ureter     Encounter for blood transfusion     ESRD (end stage renal disease) 5/18/2018    MANJINDER (generalized anxiety disorder) 10/25/2016    History of colon polyps 11/02/2016    Hyperlipidemia     Hypertension     Stroke      Past Surgical History:   Procedure Laterality Date    COLONOSCOPY N/A 10/5/2016    Procedure: COLONOSCOPY;  Surgeon: Pillo Chanel MD;  Location: [facility] ENDO;  Service: Endoscopy;  Laterality: N/A;    COLONOSCOPY N/A 4/26/2022    Procedure: COLONOSCOPY;  Surgeon: Saravanan Rachel MD;  Location: [facility] ENDO;  Service: Endoscopy;  Laterality: N/A;    HERNIA REPAIR Bilateral 11/22/2016    inguinal    HYSTERECTOMY      INCISION AND DRAINAGE FOOT Left 5/13/2022    Procedure: INCISION AND DRAINAGE, FOOT;  Surgeon: Ricardo Bruno DPM;  Location: [facility];  Service: Podiatry;  Laterality: Left;    OOPHORECTOMY       Family History     Problem Relation (Age of Onset)    Cancer Paternal Grandmother    Diabetes Father, Sister, Sister, Maternal Aunt, Maternal Grandmother    Heart disease Maternal Grandmother    Hyperlipidemia Mother    Hypertension Mother, Father        Tobacco Use    Smoking status: Never Smoker    Smokeless tobacco: Never Used   Substance and Sexual Activity    Alcohol use: No    Drug use: No    Sexual activity: Yes     Partners: Male     Review of Systems   Constitutional: Negative.    Respiratory: Negative.    Cardiovascular: Negative.    Gastrointestinal:  Negative.    Musculoskeletal:        Bilateral foot wounds significantly worse on the left than the right   Skin: Negative.      Objective:     Vital Signs (Most Recent):  Temp: 98.5 °F (36.9 °C) (06/23/22 1712)  Pulse: 74 (06/23/22 1712)  Resp: 18 (06/23/22 1712)  BP: (!) 141/68 (06/23/22 1721)  SpO2: (!) 94 % (06/23/22 1721) Vital Signs (24h Range):  Temp:  [98.4 °F (36.9 °C)-98.8 °F (37.1 °C)] 98.5 °F (36.9 °C)  Pulse:  [60-74] 74  Resp:  [18-20] 18  SpO2:  [92 %-96 %] 94 %  BP: ()/(58-89) 141/68     Weight: 89.1 kg (196 lb 6.9 oz)  Body mass index is 29.87 kg/m².        Physical Exam  Constitutional:       General: She is not in acute distress.     Appearance: She is obese.   Cardiovascular:      Pulses:           Femoral pulses are 2+ on the right side and 2+ on the left side.       Dorsalis pedis pulses are detected w/ Doppler on the right side and detected w/ Doppler on the left side.   Pulmonary:      Effort: Pulmonary effort is normal.      Breath sounds: Normal breath sounds.   Musculoskeletal:      Comments: Left heel eschar and medial and lateral wounds with exposed partially granulating deep tissue   Neurological:      Mental Status: She is alert.         Significant Labs:  BMP:   Recent Labs   Lab 06/22/22 0618 06/23/22  0521   * 119*    136   K 4.6 4.7   CL 98 102   CO2 30* 25   BUN 57* 34*   CREATININE 7.6* 5.7*   CALCIUM 9.4 9.2   MG 2.7*  --      CBC:   Recent Labs   Lab 06/22/22 0618   WBC 8.67   RBC 3.31*   HGB 8.6*   HCT 28.9*      MCV 87   MCH 26.0*   MCHC 29.8*       Significant Diagnostics:  I have reviewed all pertinent imaging results/findings within the past 24 hours.    Assessment/Plan:   Bilateral lower extremity ulcerations left greater than right  Nonpalpable pedal or popliteal pulses despite essentially normal ultrasounds  Recommend angiography bilateral lower extremities.  Active Diagnoses:    Diagnosis Date Noted POA    PRINCIPAL PROBLEM:  Diabetic ulcer  of left midfoot [E11.621, L97.429] 05/05/2022 Yes    MRSA (methicillin resistant Staphylococcus aureus) [A49.02]  Yes    Infection due to Acinetobacter species [A49.8]  Yes    Encounter for long-term (current) use of antibiotics [Z79.2]  Not Applicable    Type 2 diabetes mellitus with kidney complication, with long-term current use of insulin [E11.29, Z79.4] 06/25/2020 Not Applicable    ESRD (end stage renal disease) [N18.6] 05/18/2018 Yes    Chronic diastolic heart failure [I50.32] 10/15/2016 Yes    Hypertension associated with diabetes [E11.59, I15.2] 06/13/2013 Yes     Chronic      Problems Resolved During this Admission:       Thank you for your consult. Scheduled for angiography hopefully tomorrow    Jose Haas MD  Vascular Surgery  [facility]

## 2022-06-23 NOTE — PLAN OF CARE
SW met with Pt at bedside to discuss DC plan to LTAC and obtain Pt choice. Pt's spouse Donovan García (Spouse) [phone] (Mobile) was also present in room and gave input. Pt and Pt's spouse chose [facility].     HUNTER sent referrals to facilities.       06/23/22 1041   Post-Acute Status   Post-Acute Authorization Placement   Post-Acute Placement Status Referrals Sent   Discharge Plan   Discharge Plan A Long-term acute care facility (LTAC)   Discharge Plan B Long-term acute care facility (LTAC)

## 2022-06-23 NOTE — PROGRESS NOTES
INPATIENT NEPHROLOGY Progress Note  [facility]    Patient Name: Leanna García  Date: 06/23/2022    Reason for consultation: ESRD    Chief Complaint:   Chief Complaint   Patient presents with    Foot Injury     Pt sent by MD for evaluation of her left foot.       History of Present Illness:  55 y/o F with ESRD on HD MWF, HTN, HLD, and DMII who was sent in by podiatry from [facility] for worsening ulceration and decreased blood flow to L foot with known diabetic wound despite IV antbx, recommending vascular eval and BKA, consulted for dialysis.    Interval History:  6/22- off floor  6/23- no issues with HD yest- got off 2L    Plan of Care:    Assessment:  ESRD on HD MWF  HTN  SHPT  Anemia of CKD  Diabetic foot ulcer    Plan:    - Continue HD MWF.   - Continue UF to dry weight.  - Continue renal diet, 1.5L fluid restriction.  - Continue FERMÍN with HD.  - Dose antbx for CrCl < 10/HD. Awaiting vascular plan to guide podiatry plan.    Thank you for allowing us to participate in this patient's care. We will continue to follow.    Vital Signs:  Temp Readings from Last 3 Encounters:   06/23/22 98.4 °F (36.9 °C) (Oral)   06/21/22 98.4 °F (36.9 °C)   06/14/22 97.9 °F (36.6 °C)       Pulse Readings from Last 3 Encounters:   06/23/22 60   06/21/22 76   06/14/22 73       BP Readings from Last 3 Encounters:   06/23/22 (!) 186/89   06/21/22 (!) 154/84   06/14/22 138/64       Weight:  Wt Readings from Last 3 Encounters:   06/21/22 89.1 kg (196 lb 6.9 oz)   06/13/22 89.9 kg (198 lb 3.1 oz)   06/02/22 86.2 kg (190 lb)       Medications:  Scheduled Meds:   amLODIPine  10 mg Oral Daily    ampicillin-sulbactim (UNASYN) IVPB  3 g Intravenous Q12H    aspirin  81 mg Oral Daily    atorvastatin  80 mg Oral QHS    ciprofloxacin HCl  500 mg Oral Daily    collagenase   Topical (Top) Daily    DAPTOmycin (CUBICIN)  IV  500 mg Intravenous Q48H    enoxaparin  30 mg Subcutaneous Daily    epoetin chris-ebpx (RETACRIT)  injection  50 Units/kg Intravenous Every Mon, Wed, Fri    gabapentin  100 mg Oral BID    hydrALAZINE  50 mg Oral Q12H    insulin detemir U-100  15 Units Subcutaneous QHS    lactulose  20 g Oral BID    losartan  100 mg Oral Daily    mupirocin   Nasal BID     Continuous Infusions:  PRN Meds:.dextrose 50%, dextrose 50%, glucagon (human recombinant), glucose, glucose, hydrALAZINE, ibuprofen, insulin aspart U-100, morphine, ondansetron, sodium chloride 0.9%  No current facility-administered medications on file prior to encounter.     Current Outpatient Medications on File Prior to Encounter   Medication Sig Dispense Refill    amLODIPine (NORVASC) 10 MG tablet Take 1 tablet (10 mg total) by mouth once daily.      aspirin (ECOTRIN) 81 MG EC tablet Take 81 mg by mouth once daily.      azelastine (ASTELIN) 137 mcg (0.1 %) nasal spray 1 spray by Nasal route 2 (two) times a day.      blood sugar diagnostic Strp To check BG 4 times daily, to use with insurance preferred meter (Patient taking differently: 1 strip by Misc.(Non-Drug; Combo Route) route 4 (four) times daily. To check BG 4 times daily, to use with insurance preferred meter) 450 strip 3    calcium acetate,phosphat bind, (PHOSLO) 667 mg capsule Take 1 capsule (667 mg total) by mouth 3 (three) times daily with meals.      cetirizine (ZYRTEC) 10 MG tablet Take 1 tablet (10 mg total) by mouth every other day.      ciprofloxacin HCl (CIPRO) 500 MG tablet Take 1 tablet (500 mg total) by mouth once daily. On dialysis days give after HD. Last dose 6/29/22      fluticasone propionate (FLONASE) 50 mcg/actuation nasal spray 2 sprays (100 mcg total) by Each Nostril route once daily.      gabapentin (NEURONTIN) 100 MG capsule Take 1 capsule (100 mg total) by mouth 2 (two) times daily.      hydrALAZINE (APRESOLINE) 50 MG tablet Take 1 tablet (50 mg total) by mouth every 12 (twelve) hours.      HYDROcodone-acetaminophen (NORCO) 5-325 mg per tablet Take 1 tablet by  "mouth every 6 (six) hours as needed. 15 tablet 0    insulin aspart U-100 (NOVOLOG FLEXPEN U-100 INSULIN) 100 unit/mL (3 mL) InPn pen Inject 5-8 units 3 times per day with food. 1 Box 6    insulin detemir U-100 (LEVEMIR FLEXTOUCH U-100 INSULN) 100 unit/mL (3 mL) InPn pen Inject 15 Units into the skin every evening.      lactulose (CHRONULAC) 10 gram/15 mL solution Take 20 g by mouth 2 (two) times a day.      lancets Misc To use to check blood sugar 4 times daily. To use with insurance approved meter. Patient needs the round, purple one (Patient taking differently: 1 lancet by Misc.(Non-Drug; Combo Route) route 4 (four) times daily. To use to check blood sugar 4 times daily. To use with insurance approved meter. Patient needs the round, purple one) 450 each 3    losartan (COZAAR) 100 MG tablet Take 1 tablet (100 mg total) by mouth once daily.      multivitamin (THERAGRAN) per tablet Take 1 tablet by mouth once daily.      oxybutynin (DITROPAN) 5 MG Tab Take 1 tablet (5 mg total) by mouth 3 (three) times daily.      pen needle, diabetic (BD ULTRA-FINE ROBERT PEN NEEDLE) 32 gauge x 5/32" Ndle To use 4 times per day with insulin injections. (Patient taking differently: 1 pen by Misc.(Non-Drug; Combo Route) route 4 (four) times daily. To use 4 times per day with insulin injections.) 450 each 2    polyethylene glycol (GLYCOLAX) 17 gram PwPk Take 17 g by mouth 2 (two) times daily as needed.      sodium chloride 0.9% SolP 50 mL with DAPTOmycin 500 mg SolR 500 mg Inject 500 mg into the vein every 48 hours. Until 6/29/22      [DISCONTINUED] atorvastatin (LIPITOR) 80 MG tablet Take 1 tablet (80 mg total) by mouth once daily. (Patient taking differently: Take 80 mg by mouth every evening.) 90 tablet 3    [DISCONTINUED] blood-glucose meter (ACCU-CHEK KAYLIE PLUS METER) Misc To use to check blood sugars 4 times a day 1 each 0    [DISCONTINUED] clorazepate (TRANXENE) 3.75 MG Tab Take 7.5 mg by mouth nightly as needed.    "    [DISCONTINUED] dextrose (GLUCOSE GEL) 40 % gel Take 37.5 mLs (15,000 mg total) by mouth once as needed (hypoglycemia). 37.5 g 4    [DISCONTINUED] ezetimibe (ZETIA) 10 mg tablet Take 1 tablet (10 mg total) by mouth once daily.      [DISCONTINUED] fenofibrate 160 MG Tab Take 1 tablet (160 mg total) by mouth once daily. 90 tablet 3    [DISCONTINUED] lancing device with lancets (ACCU-CHEK SOFT DEV LANCETS) Kit To check blood sugars 4 times per day 400 each 3    [DISCONTINUED] pantoprazole (PROTONIX) 40 MG tablet Take 1 tablet (40 mg total) by mouth once daily.         Review of Systems:  neg    Physical Exam:  Constitutional: nad, aao x 3  Heart: rrr, no m/r/g, wwp, foot wrapped/wound vac  Lungs: ctab, no w/r/r/c, no lb  Abdomen: s/nt/nd, +BS      Results:  Lab Results   Component Value Date     06/23/2022    K 4.7 06/23/2022     06/23/2022    CO2 25 06/23/2022    BUN 34 (H) 06/23/2022    CREATININE 5.7 (H) 06/23/2022    CALCIUM 9.2 06/23/2022    ANIONGAP 9 06/23/2022    ESTGFRAFRICA 8.9 (A) 06/23/2022    EGFRNONAA 7.7 (A) 06/23/2022       Lab Results   Component Value Date    CALCIUM 9.2 06/23/2022    PHOS 5.9 (H) 05/25/2022       No results for input(s): WBC, RBC, HGB, HCT, PLT, MCV, MCH, MCHC in the last 24 hours.    I have personally reviewed pertinent radiological imaging and reports.    I have spent > 35 minutes providing care for this patient for the above diagnoses. These services have included chart/data/imaging review, evaluation, exam, formulation of plan, , note preparation, and discussions with staff involved in this patient's care.    Ashleigh Soria MD MPH  [facility] [address] [address]  [phone] (p)  [phone] (f)

## 2022-06-23 NOTE — PROGRESS NOTES
[facility] Medicine    Progress Note    Patient Name: Leanna García  MRN: [id number]  Patient Class: IP- Inpatient   Admission Date: 6/21/2022 10:04 AM  Length of Stay: 2  Attending Physician: Conchita Douglas MD  Primary Care Provider: Stefano Lopez MD  Face-to-Face encounter date: 06/23/2022  Code status:  Chief Complaint: Foot Injury (Pt sent by MD for evaluation of her left foot.)        Subjective:    HPI:Leanna García is a 56 y.o.  female who  has a past medical history of A-fib, Arthritis, Bronchitis, Cardiovascular event risk, ASCVD 10-year risk 6.7% (10/15/2016), Diabetes mellitus, Diabetes mellitus type II, Diabetic ulcer of left midfoot (5/5/2022), Disorder of kidney and ureter, Encounter for blood transfusion, ESRD (end stage renal disease) (5/18/2018), MANJINDER (generalized anxiety disorder) (10/25/2016), History of colon polyps (11/02/2016), Hyperlipidemia, Hypertension, and Stroke.. The patient presented to [facility] on 6/21/2022 with a primary complaint of Foot Injury (Pt sent by MD for evaluation of her left foot.)  Patient is currently undergoing treat at [facility] and followed up with podiatrist today.  Podiatry evaluated patient and referred patient to the ED on account of worsening diabetic foot ulcer.     Interval History:   6/22: Patient is doing well. Awaiting vascular consult. No concerns/issues overnight reported by the patient or the nursing staff.\    6/23: Case management consulted for placement, still awaiting vascular surgeons eval. ID aaded Unasyn     Review of Systems All other Review of Systems were found to be negative expect for that mentioned already in HPI.     Objective:     Vitals:    06/23/22 0350 06/23/22 0823 06/23/22 1157 06/23/22 1712   BP: 129/69 (!) 186/89  (!) 97/58   BP Location: Right arm      Patient Position: Lying      Pulse: 60   74   Resp: 18  18 18   Temp: 98.4 °F (36.9 °C)   98.5 °F (36.9 °C)    TempSrc: Oral   Oral   SpO2: 95%   (!) 92%   Weight:       Height:            Vitals reviewed.  Constitutional: No distress.   HENT: NC  Head: Atraumatic.   Cardiovascular: Normal rate, regular rhythm and normal heart sounds.   Pulmonary/Chest: Effort normal. No wheezes.   Abdominal: Soft. Bowel sounds are normal. No distension and no mass. No tenderness  Neurological: Alert.   Skin: Skin is warm and dry.   Psych: Appropriate mood and affect    Following labs were Reviewed   CBC:  No results for input(s): WBC, HGB, HCT, PLT in the last 24 hours.  CMP:  Recent Labs   Lab 06/23/22  0521   CALCIUM 9.2      K 4.7   CO2 25      BUN 34*   CREATININE 5.7*       Micro Results  Microbiology Results (last 7 days)     Procedure Component Value Units Date/Time    Blood culture #1 **CANNOT BE ORDERED STAT** [[id number]] Collected: 06/21/22 1143    Order Status: Completed Specimen: Blood from Peripheral, Wrist, Right Updated: 06/23/22 1232     Blood Culture, Routine No Growth to date      No Growth to date      No Growth to date    Blood culture #2 **CANNOT BE ORDERED STAT** [[id number]] Collected: 06/21/22 1155    Order Status: Completed Specimen: Blood from Peripheral, Antecubital, Right Updated: 06/23/22 1232     Blood Culture, Routine No Growth to date      No Growth to date      No Growth to date    Aerobic culture [[id number]]     Order Status: No result Specimen: Wound            Radiology Reports  X-Ray Chest 1 View  Result Date: 6/3/2022  No acute process Electronically signed by: Neto Bustillos MD Date:    06/03/2022 Time:    09:25    X-Ray Foot Complete Bilateral  Result Date: 6/21/2022  IMPRESSION: 1.  Bilateral degenerative changes and pes planus as described. 2.  No destructive osseous lesions observed. 3.  Ulcer in the plantar soft tissues of the left foot. 4.  Marked bilateral atherosclerosis. 5.  Amputation an osteotomy of the left fourth digit. Electronically signed by:  Kiel Garvey DO   6/21/2022 11:30 AM CDT Workstation: [id number]    MRI Foot (Forefoot) Left Without Contrast  Result Date: 6/8/2022  Poor quality MRI due to patient motion.  While there remains an ulceration in the plantar medial soft tissues a focal abscess is not seen.  Osteomyelitis is not identified. Electronically signed by: Jose De León MD Date:    06/08/2022 Time:    18:16       Meds  Scheduled Meds:   amLODIPine  10 mg Oral Daily    ampicillin-sulbactim (UNASYN) IVPB  3 g Intravenous Q12H    aspirin  81 mg Oral Daily    atorvastatin  80 mg Oral QHS    ciprofloxacin HCl  500 mg Oral Daily    collagenase   Topical (Top) Daily    DAPTOmycin (CUBICIN)  IV  500 mg Intravenous Q48H    enoxaparin  30 mg Subcutaneous Daily    epoetin chris-ebpx (RETACRIT) injection  50 Units/kg Intravenous Every Mon, Wed, Fri    gabapentin  100 mg Oral BID    hydrALAZINE  50 mg Oral Q12H    insulin detemir U-100  15 Units Subcutaneous QHS    lactulose  20 g Oral BID    losartan  100 mg Oral Daily    mupirocin   Nasal BID     Continuous Infusions:  PRN Meds:.dextrose 50%, dextrose 50%, glucagon (human recombinant), glucose, glucose, hydrALAZINE, ibuprofen, insulin aspart U-100, morphine, ondansetron, sodium chloride 0.9%.    Assessment & Plan:      *Diabetic ulcer of left midfoot  Morphine 2mg q.4h p.r.n. for pain  Continue IV daptomycin and IV Unasyn and oral Cipro   Blood cultures currently show no growth  Podiatry, Wound Care, ID, Vascular surg on board  Wound care and cultures       Type 2 diabetes mellitus with kidney complication, with long-term current use of insulin  Continue Levemir 15 units nightly  Insulin sliding scale  Regular accuchek       ESRD (end stage renal disease)  Consult nephrology for dialysis       Hypertension associated with diabetes  Resume home medication of hydralazine 50mg BID, losartan 100mg, amlodipine 10mg daily and titrate upwards as needed      PAD  Consult vascular surgeon  Atorvastatin 40  mg daily  Aspirin 81 mg daily               Discharge Planning:   Is the patient medically ready for discharge?: no    Reason for patient still in hospital (select all that apply): Patient trending condition and Treatment    Above encounter included review of the medical records, interviewing and examining the patient face-to-face, discussion with family and other health care providers, ordering and interpreting lab/test results and formulating a plan of care.     Medical Decision Making:      [_] Low Complexity  [_] Moderate Complexity  [x] High Complexity      Conchita Douglas MD  Department of Hospital Medicine   [facility]

## 2022-06-23 NOTE — CONSULTS
[facility]  Vascular Surgery  Consult Note    Consults  Subjective:     Chief Complaint/Reason for Admission:  Left foot wound    History of Present Illness:  56-year-old female with long history of diabetes.  Has developed a left heel and lateral foot wounds over the several weeks.  She is unsure how the started but they have progressed despite wound care and antibiotics.  She had arterial duplex ultrasound which did not show any significant large vessel disease in the lower extremity.  She is on dialysis.    Medications Prior to Admission   Medication Sig Dispense Refill Last Dose    amLODIPine (NORVASC) 10 MG tablet Take 1 tablet (10 mg total) by mouth once daily.   Unknown at Unknown time    aspirin (ECOTRIN) 81 MG EC tablet Take 81 mg by mouth once daily.   Unknown at Unknown time    azelastine (ASTELIN) 137 mcg (0.1 %) nasal spray 1 spray by Nasal route 2 (two) times a day.   Unknown at Unknown time    blood sugar diagnostic Strp To check BG 4 times daily, to use with insurance preferred meter (Patient taking differently: 1 strip by Misc.(Non-Drug; Combo Route) route 4 (four) times daily. To check BG 4 times daily, to use with insurance preferred meter) 450 strip 3     calcium acetate,phosphat bind, (PHOSLO) 667 mg capsule Take 1 capsule (667 mg total) by mouth 3 (three) times daily with meals.   Unknown at Unknown time    cetirizine (ZYRTEC) 10 MG tablet Take 1 tablet (10 mg total) by mouth every other day.   Unknown at Unknown time    ciprofloxacin HCl (CIPRO) 500 MG tablet Take 1 tablet (500 mg total) by mouth once daily. On dialysis days give after HD. Last dose 6/29/22   Unknown at Unknown time    fluticasone propionate (FLONASE) 50 mcg/actuation nasal spray 2 sprays (100 mcg total) by Each Nostril route once daily.   Unknown at Unknown time    gabapentin (NEURONTIN) 100 MG capsule Take 1 capsule (100 mg total) by mouth 2 (two) times daily.   Unknown at Unknown time    hydrALAZINE  "(APRESOLINE) 50 MG tablet Take 1 tablet (50 mg total) by mouth every 12 (twelve) hours.   Unknown at Unknown time    HYDROcodone-acetaminophen (NORCO) 5-325 mg per tablet Take 1 tablet by mouth every 6 (six) hours as needed. 15 tablet 0 Unknown at Unknown time    insulin aspart U-100 (NOVOLOG FLEXPEN U-100 INSULIN) 100 unit/mL (3 mL) InPn pen Inject 5-8 units 3 times per day with food. 1 Box 6 Unknown at Unknown time    insulin detemir U-100 (LEVEMIR FLEXTOUCH U-100 INSULN) 100 unit/mL (3 mL) InPn pen Inject 15 Units into the skin every evening.   Unknown at Unknown time    lactulose (CHRONULAC) 10 gram/15 mL solution Take 20 g by mouth 2 (two) times a day.   Unknown at Unknown time    lancets Misc To use to check blood sugar 4 times daily. To use with insurance approved meter. Patient needs the round, purple one (Patient taking differently: 1 lancet by Misc.(Non-Drug; Combo Route) route 4 (four) times daily. To use to check blood sugar 4 times daily. To use with insurance approved meter. Patient needs the round, purple one) 450 each 3     losartan (COZAAR) 100 MG tablet Take 1 tablet (100 mg total) by mouth once daily.   Unknown at Unknown time    multivitamin (THERAGRAN) per tablet Take 1 tablet by mouth once daily.   Unknown at Unknown time    oxybutynin (DITROPAN) 5 MG Tab Take 1 tablet (5 mg total) by mouth 3 (three) times daily.   Unknown at Unknown time    pen needle, diabetic (BD ULTRA-FINE ROBERT PEN NEEDLE) 32 gauge x 5/32" Ndle To use 4 times per day with insulin injections. (Patient taking differently: 1 pen by Misc.(Non-Drug; Combo Route) route 4 (four) times daily. To use 4 times per day with insulin injections.) 450 each 2     polyethylene glycol (GLYCOLAX) 17 gram PwPk Take 17 g by mouth 2 (two) times daily as needed.   Unknown at Unknown time    sodium chloride 0.9% SolP 50 mL with DAPTOmycin 500 mg SolR 500 mg Inject 500 mg into the vein every 48 hours. Until 6/29/22   Unknown at Unknown " time       Review of patient's allergies indicates:   Allergen Reactions    Dilaudid [hydromorphone] Nausea And Vomiting    Vancomycin Itching    Chlorhexidine Itching    Lortab [hydrocodone-acetaminophen] Itching       Past Medical History:   Diagnosis Date    A-fib     resolved per patient    Arthritis     Bronchitis     Cardiovascular event risk, ASCVD 10-year risk 6.7% 10/15/2016    Diabetes mellitus     Diabetes mellitus type II     Diabetic ulcer of left midfoot 5/5/2022    Disorder of kidney and ureter     Encounter for blood transfusion     ESRD (end stage renal disease) 5/18/2018    MANJINDER (generalized anxiety disorder) 10/25/2016    History of colon polyps 11/02/2016    Hyperlipidemia     Hypertension     Stroke      Past Surgical History:   Procedure Laterality Date    COLONOSCOPY N/A 10/5/2016    Procedure: COLONOSCOPY;  Surgeon: Pillo Chanel MD;  Location: [facility] ENDO;  Service: Endoscopy;  Laterality: N/A;    COLONOSCOPY N/A 4/26/2022    Procedure: COLONOSCOPY;  Surgeon: Saravanan Rachel MD;  Location: [facility] ENDO;  Service: Endoscopy;  Laterality: N/A;    HERNIA REPAIR Bilateral 11/22/2016    inguinal    HYSTERECTOMY      INCISION AND DRAINAGE FOOT Left 5/13/2022    Procedure: INCISION AND DRAINAGE, FOOT;  Surgeon: Ricardo Bruno DPM;  Location: [facility] OR;  Service: Podiatry;  Laterality: Left;    OOPHORECTOMY       Family History     Problem Relation (Age of Onset)    Cancer Paternal Grandmother    Diabetes Father, Sister, Sister, Maternal Aunt, Maternal Grandmother    Heart disease Maternal Grandmother    Hyperlipidemia Mother    Hypertension Mother, Father        Tobacco Use    Smoking status: Never Smoker    Smokeless tobacco: Never Used   Substance and Sexual Activity    Alcohol use: No    Drug use: No    Sexual activity: Yes     Partners: Male     Review of Systems   Constitutional: Negative.    Respiratory: Negative.    Cardiovascular: Negative.    Gastrointestinal:  Negative.    Musculoskeletal:        Bilateral foot wounds significantly worse on the left than the right   Skin: Negative.      Objective:     Vital Signs (Most Recent):  Temp: 98.5 °F (36.9 °C) (06/23/22 1712)  Pulse: 74 (06/23/22 1712)  Resp: 18 (06/23/22 1712)  BP: (!) 141/68 (06/23/22 1721)  SpO2: (!) 94 % (06/23/22 1721) Vital Signs (24h Range):  Temp:  [98.4 °F (36.9 °C)-98.8 °F (37.1 °C)] 98.5 °F (36.9 °C)  Pulse:  [60-74] 74  Resp:  [18-20] 18  SpO2:  [92 %-96 %] 94 %  BP: ()/(58-89) 141/68     Weight: 89.1 kg (196 lb 6.9 oz)  Body mass index is 29.87 kg/m².        Physical Exam  Constitutional:       General: She is not in acute distress.     Appearance: She is obese.   Cardiovascular:      Pulses:           Femoral pulses are 2+ on the right side and 2+ on the left side.       Dorsalis pedis pulses are detected w/ Doppler on the right side and detected w/ Doppler on the left side.   Pulmonary:      Effort: Pulmonary effort is normal.      Breath sounds: Normal breath sounds.   Musculoskeletal:      Comments: Left heel eschar and medial and lateral wounds with exposed partially granulating deep tissue   Neurological:      Mental Status: She is alert.         Significant Labs:  BMP:   Recent Labs   Lab 06/22/22 0618 06/23/22  0521   * 119*    136   K 4.6 4.7   CL 98 102   CO2 30* 25   BUN 57* 34*   CREATININE 7.6* 5.7*   CALCIUM 9.4 9.2   MG 2.7*  --      CBC:   Recent Labs   Lab 06/22/22 0618   WBC 8.67   RBC 3.31*   HGB 8.6*   HCT 28.9*      MCV 87   MCH 26.0*   MCHC 29.8*       Significant Diagnostics:  I have reviewed all pertinent imaging results/findings within the past 24 hours.    Assessment/Plan:   Bilateral lower extremity ulcerations left greater than right  Nonpalpable pedal or popliteal pulses despite essentially normal ultrasounds  Recommend angiography bilateral lower extremities.  Active Diagnoses:    Diagnosis Date Noted POA    PRINCIPAL PROBLEM:  Diabetic ulcer  of left midfoot [E11.621, L97.429] 05/05/2022 Yes    MRSA (methicillin resistant Staphylococcus aureus) [A49.02]  Yes    Infection due to Acinetobacter species [A49.8]  Yes    Encounter for long-term (current) use of antibiotics [Z79.2]  Not Applicable    Type 2 diabetes mellitus with kidney complication, with long-term current use of insulin [E11.29, Z79.4] 06/25/2020 Not Applicable    ESRD (end stage renal disease) [N18.6] 05/18/2018 Yes    Chronic diastolic heart failure [I50.32] 10/15/2016 Yes    Hypertension associated with diabetes [E11.59, I15.2] 06/13/2013 Yes     Chronic      Problems Resolved During this Admission:       Thank you for your consult. Scheduled for angiography hopefully tomorrow    Jose Haas MD  Vascular Surgery  [facility]

## 2022-06-23 NOTE — PROGRESS NOTES
6/21/22  Chart reviewed, cultures and [facility] ID notes, podiatry notes and available photos.   unasyn added to cover acinetobacter  Will see in person 6/22 6/22/22  Consult Note  Infectious Disease    Reason for Consult:  Diabetic foot infection, PAD    HPI: Leanna García is a 56 y.o. female known to me from long hospitalization in May at which time she was treated for bacillus serious bacteremia from a food-borne infection and for MRSA infection of the left medial foot which required several debridements and prolonged IV antibiotics.  MRIs were negative for osteomyelitis and she was transferred to [facility] where she was until 6/14.  One 6/7 she saw Dr. Bruno in the wound clinic and a culture was obtained growing Acinetobacter.  MRI on 06/08 of the forefoot was negative.  Her antibiotics were adjusted by ID at the [facility] and the at the time of her transfer from [facility] to the nursing home she was on daptomycin and oral Cipro.  She was seen in Wound Care Clinic yesterday and felt to have not had adequate improvement in was referred to the hospital for admission.  Amputation was recommended but declined by the patient.  She has a history of poorly controlled diabetes and her A1c which was greater than 14% earlier this year was most recently 8% in May.  She does not currently have a fever or leukocytosis in compared to my last examination of her foot the medial foot wound is smaller and shallower but the left great toe is erythematous and bruised appearing.  She suffered deep tissue injury to the left heel while hospitalized here in May and now has a hard eschar over the left heel.    6/23: interim reviewed. Vascular consult pending, CRP lower.  wanted to clarify that they did not refuse an amputation, and have come to terms if she absolutely needs it, but want to pursue as many options possible to avoid this. The great toe may require amputation however and they understand this.     EXAM & DIAGNOSTICS REVIEWED:    Vitals:     Temp:  [97.8 °F (36.6 °C)-98.8 °F (37.1 °C)]   Temp: 98.4 °F (36.9 °C) (06/23/22 0350)  Pulse: 60 (06/23/22 0350)  Resp: 18 (06/23/22 1157)  BP: (!) 186/89 (06/23/22 0823)  SpO2: 95 % (06/23/22 0350)  No intake or output data in the 24 hours ending 06/23/22 1329    General:  In NAD. Alert and attentive, cooperative, comfortable. Has less facial and scleral puffiness  Eyes:  Anicteric,   EOMI. Scleral puffiness is improved  ENT:  No ulcers, exudates, thrush, nares patent,    Neck:  Supple,   Lungs:    Heart:     Abd:     :  Voids   Musc:  Joints without effusion, swelling, erythema, synovitis,  .   Skin:  No rashes.   Neuro:             Alert, attentive, speech fluent, face symmetric, moves all extremities, no focal weakness. Ambulatory  Psych:  Calm, cooperative  Lymphatic:      Extrem: No pitting edema,   Warm. See photos below  VAD:     Right arm midline, not working?  Isolation:  contact  Wound:   The left  Foot medial wound is smaller and shallower. The left great toe is erythematous, peeling, ?bruised  There is a dense black eschar left heel    6/23: the great toe ulcer is full thickness, part necrotic slough, with cellulitis is a little better.              General Labs reviewed:  Recent Labs   Lab 06/21/22  1143 06/22/22 0618   WBC 8.92 8.67   HGB 10.7* 8.6*   HCT 36.2* 28.9*    350       Recent Labs   Lab 06/21/22  1143 06/22/22  0618 06/23/22  0521   * 139 136   K 4.6 4.6 4.7   CL 92* 98 102   CO2 31* 30* 25   BUN 44* 57* 34*   CREATININE 5.8* 7.6* 5.7*   CALCIUM 10.0 9.4 9.2   PROT 8.1  --   --    BILITOT 0.4  --   --    ALKPHOS 67  --   --    ALT 11  --   --    AST 15  --   --      Recent Labs   Lab 06/22/22  0618 06/23/22  0521   CRP 5.28* 4.68*         Micro:  Microbiology Results (last 7 days)     Procedure Component Value Units Date/Time    Blood culture #1 **CANNOT BE ORDERED STAT** [[id number]] Collected: 06/21/22 1143    Order Status: Completed Specimen: Blood from  Peripheral, Wrist, Right Updated: 06/23/22 1232     Blood Culture, Routine No Growth to date      No Growth to date      No Growth to date    Blood culture #2 **CANNOT BE ORDERED STAT** [[id number]] Collected: 06/21/22 1155    Order Status: Completed Specimen: Blood from Peripheral, Antecubital, Right Updated: 06/23/22 1232     Blood Culture, Routine No Growth to date      No Growth to date      No Growth to date    Aerobic culture [[id number]]     Order Status: No result Specimen: Wound           Imaging Reviewed:  5/6/22 arterial US  1. No arterial occlusion or hemodynamically significant stenosis identified.  2. Diffuse bilateral lower extremity peripheral arterial disease.    MRI left forefoot 6/8 negative    Foot xray      Cardiology:    IMPRESSION & PLAN   1. Chronic left foot wounds, s/p abscess with MRSA, and clinically not infected   Left great toe is erythematous, and left heel has eschar due to decubitus   Not making much progress, but would not call it non-salvageable     2. Poorly controlled diabetes, improved  3. PAD, small vessel presumed  4. ESRD, may need more volume removed  5. Recent h/o Bacillus cereus bacteremia      Recommendations:  Continue daptomycin and unasyn   WBC scan has been ordered by Dr. Bruno  Awaiting vascular evaluation      Medical Decision Making during this encounter was  [_] Low Complexity  [_] Moderate Complexity  [ xx ] High Complexity  D/w patient and

## 2022-06-23 NOTE — ASSESSMENT & PLAN NOTE
Vascular intervention- awaiting intervention and recommendations regarding blood flow to her foot and whether she would  Heal a toe amputation if it becomes necessary in the future      No surgical intervention planned by podiatry    Continue would vac on left foot  Continue dressing changes with aquacel ag on remaining wounds    Float heels at all times  Pillow boots as well      vicky and glucerna daily- a1c is much improved!

## 2022-06-23 NOTE — PLAN OF CARE
SW met with Pt at bedside to discuss DC plan to LTAC and obtain Pt choice. Pt's spouse Donovan García (Spouse) [phone] (Mobile) was also present in room and gave input. Pt and Pt's spouse chose [facility].      SW sent referrals to facilities.       06/23/22 1044   Discharge Reassessment   Assessment Type Discharge Planning Reassessment   Did the patient's condition or plan change since previous assessment? Yes   Discharge Plan discussed with: Spouse/sig other;Patient   Name(s) and Number(s) Donovan García (Spouse)   [phone] (Mobile)   Communicated ERI with patient/caregiver Yes   Discharge Plan A Long-term acute care facility (LTAC)   Discharge Plan B Long-term acute care facility (LTAC)   DME Needed Upon Discharge  none   Discharge Barriers Identified None   Post-Acute Status   Post-Acute Authorization Placement   Post-Acute Placement Status Referrals Sent   Patient choice form signed by patient/caregiver List from [employer]   Discharge Delays None known at this time

## 2022-06-24 LAB
ANION GAP SERPL CALC-SCNC: 12 MMOL/L (ref 8–16)
BUN SERPL-MCNC: 65 MG/DL (ref 6–20)
CALCIUM SERPL-MCNC: 9.7 MG/DL (ref 8.7–10.5)
CHLORIDE SERPL-SCNC: 98 MMOL/L (ref 95–110)
CO2 SERPL-SCNC: 24 MMOL/L (ref 23–29)
CREAT SERPL-MCNC: 7.6 MG/DL (ref 0.5–1.4)
CRP SERPL-MCNC: 5.52 MG/DL
ERYTHROCYTE [DISTWIDTH] IN BLOOD BY AUTOMATED COUNT: 16.5 % (ref 11.5–14.5)
EST. GFR  (AFRICAN AMERICAN): 6.3 ML/MIN/1.73 M^2
EST. GFR  (NON AFRICAN AMERICAN): 5.4 ML/MIN/1.73 M^2
GLUCOSE SERPL-MCNC: 113 MG/DL (ref 70–110)
GLUCOSE SERPL-MCNC: 118 MG/DL (ref 70–110)
GLUCOSE SERPL-MCNC: 119 MG/DL (ref 70–110)
GLUCOSE SERPL-MCNC: 174 MG/DL (ref 70–110)
HCT VFR BLD AUTO: 33 % (ref 37–48.5)
HGB BLD-MCNC: 9.8 G/DL (ref 12–16)
MAGNESIUM SERPL-MCNC: 2.7 MG/DL (ref 1.6–2.6)
MCH RBC QN AUTO: 25.9 PG (ref 27–31)
MCHC RBC AUTO-ENTMCNC: 29.7 G/DL (ref 32–36)
MCV RBC AUTO: 87 FL (ref 82–98)
PLATELET # BLD AUTO: 358 K/UL (ref 150–450)
PMV BLD AUTO: 9.5 FL (ref 9.2–12.9)
POTASSIUM SERPL-SCNC: 4.9 MMOL/L (ref 3.5–5.1)
RBC # BLD AUTO: 3.78 M/UL (ref 4–5.4)
SODIUM SERPL-SCNC: 134 MMOL/L (ref 136–145)
WBC # BLD AUTO: 9.56 K/UL (ref 3.9–12.7)

## 2022-06-24 PROCEDURE — 86140 C-REACTIVE PROTEIN: CPT | Performed by: STUDENT IN AN ORGANIZED HEALTH CARE EDUCATION/TRAINING PROGRAM

## 2022-06-24 PROCEDURE — 97605 NEG PRS WND THER DME<=50SQCM: CPT

## 2022-06-24 PROCEDURE — 25000003 PHARM REV CODE 250: Performed by: SURGERY

## 2022-06-24 PROCEDURE — C1887 CATHETER, GUIDING: HCPCS | Performed by: SURGERY

## 2022-06-24 PROCEDURE — C1769 GUIDE WIRE: HCPCS | Performed by: SURGERY

## 2022-06-24 PROCEDURE — 12000002 HC ACUTE/MED SURGE SEMI-PRIVATE ROOM

## 2022-06-24 PROCEDURE — 99153 MOD SED SAME PHYS/QHP EA: CPT | Performed by: SURGERY

## 2022-06-24 PROCEDURE — 25000003 PHARM REV CODE 250: Performed by: STUDENT IN AN ORGANIZED HEALTH CARE EDUCATION/TRAINING PROGRAM

## 2022-06-24 PROCEDURE — C1894 INTRO/SHEATH, NON-LASER: HCPCS | Performed by: SURGERY

## 2022-06-24 PROCEDURE — 37224 HC FEM/POPL REVAS W/TLA: CPT | Mod: LT | Performed by: SURGERY

## 2022-06-24 PROCEDURE — 63600175 PHARM REV CODE 636 W HCPCS: Performed by: STUDENT IN AN ORGANIZED HEALTH CARE EDUCATION/TRAINING PROGRAM

## 2022-06-24 PROCEDURE — 99152 MOD SED SAME PHYS/QHP 5/>YRS: CPT | Performed by: SURGERY

## 2022-06-24 PROCEDURE — 99231 SBSQ HOSP IP/OBS SF/LOW 25: CPT | Mod: ,,, | Performed by: INTERNAL MEDICINE

## 2022-06-24 PROCEDURE — C1725 CATH, TRANSLUMIN NON-LASER: HCPCS | Performed by: SURGERY

## 2022-06-24 PROCEDURE — 99231 PR SUBSEQUENT HOSPITAL CARE,LEVL I: ICD-10-PCS | Mod: ,,, | Performed by: INTERNAL MEDICINE

## 2022-06-24 PROCEDURE — 85027 COMPLETE CBC AUTOMATED: CPT | Performed by: STUDENT IN AN ORGANIZED HEALTH CARE EDUCATION/TRAINING PROGRAM

## 2022-06-24 PROCEDURE — 25000003 PHARM REV CODE 250: Performed by: INTERNAL MEDICINE

## 2022-06-24 PROCEDURE — 63600175 PHARM REV CODE 636 W HCPCS: Performed by: SURGERY

## 2022-06-24 PROCEDURE — 63600175 PHARM REV CODE 636 W HCPCS: Mod: JG | Performed by: INTERNAL MEDICINE

## 2022-06-24 PROCEDURE — 36415 COLL VENOUS BLD VENIPUNCTURE: CPT | Performed by: STUDENT IN AN ORGANIZED HEALTH CARE EDUCATION/TRAINING PROGRAM

## 2022-06-24 PROCEDURE — 80048 BASIC METABOLIC PNL TOTAL CA: CPT | Performed by: STUDENT IN AN ORGANIZED HEALTH CARE EDUCATION/TRAINING PROGRAM

## 2022-06-24 PROCEDURE — 63600175 PHARM REV CODE 636 W HCPCS: Performed by: INTERNAL MEDICINE

## 2022-06-24 PROCEDURE — 27201423 OPTIME MED/SURG SUP & DEVICES STERILE SUPPLY: Performed by: SURGERY

## 2022-06-24 PROCEDURE — 83735 ASSAY OF MAGNESIUM: CPT | Performed by: STUDENT IN AN ORGANIZED HEALTH CARE EDUCATION/TRAINING PROGRAM

## 2022-06-24 PROCEDURE — 75630 X-RAY AORTA LEG ARTERIES: CPT | Mod: XU | Performed by: SURGERY

## 2022-06-24 PROCEDURE — 90935 HEMODIALYSIS ONE EVALUATION: CPT

## 2022-06-24 RX ORDER — CETIRIZINE HYDROCHLORIDE 10 MG/1
10 TABLET ORAL EVERY OTHER DAY
Status: DISCONTINUED | OUTPATIENT
Start: 2022-06-25 | End: 2022-06-30 | Stop reason: HOSPADM

## 2022-06-24 RX ORDER — AZELASTINE 1 MG/ML
1 SPRAY, METERED NASAL 2 TIMES DAILY
Status: DISCONTINUED | OUTPATIENT
Start: 2022-06-24 | End: 2022-06-30 | Stop reason: HOSPADM

## 2022-06-24 RX ORDER — ONDANSETRON 4 MG/1
8 TABLET, ORALLY DISINTEGRATING ORAL EVERY 8 HOURS PRN
Status: DISCONTINUED | OUTPATIENT
Start: 2022-06-24 | End: 2022-06-30 | Stop reason: HOSPADM

## 2022-06-24 RX ORDER — CLOPIDOGREL BISULFATE 75 MG/1
75 TABLET ORAL DAILY
Status: DISCONTINUED | OUTPATIENT
Start: 2022-06-24 | End: 2022-06-30 | Stop reason: HOSPADM

## 2022-06-24 RX ORDER — OXYBUTYNIN CHLORIDE 5 MG/1
5 TABLET ORAL 3 TIMES DAILY
Status: DISCONTINUED | OUTPATIENT
Start: 2022-06-24 | End: 2022-06-30 | Stop reason: HOSPADM

## 2022-06-24 RX ORDER — ACETAMINOPHEN 325 MG/1
650 TABLET ORAL EVERY 4 HOURS PRN
Status: DISCONTINUED | OUTPATIENT
Start: 2022-06-24 | End: 2022-06-30 | Stop reason: HOSPADM

## 2022-06-24 RX ORDER — FLUTICASONE PROPIONATE 50 MCG
2 SPRAY, SUSPENSION (ML) NASAL DAILY
Status: DISCONTINUED | OUTPATIENT
Start: 2022-06-25 | End: 2022-06-30 | Stop reason: HOSPADM

## 2022-06-24 RX ORDER — FENTANYL CITRATE 50 UG/ML
INJECTION, SOLUTION INTRAMUSCULAR; INTRAVENOUS
Status: DISCONTINUED | OUTPATIENT
Start: 2022-06-24 | End: 2022-06-24 | Stop reason: HOSPADM

## 2022-06-24 RX ORDER — CALCIUM ACETATE 667 MG/1
667 CAPSULE ORAL
Status: DISCONTINUED | OUTPATIENT
Start: 2022-06-25 | End: 2022-06-30 | Stop reason: HOSPADM

## 2022-06-24 RX ORDER — MIDAZOLAM HYDROCHLORIDE 1 MG/ML
INJECTION, SOLUTION INTRAMUSCULAR; INTRAVENOUS
Status: DISCONTINUED | OUTPATIENT
Start: 2022-06-24 | End: 2022-06-24 | Stop reason: HOSPADM

## 2022-06-24 RX ORDER — LIDOCAINE HYDROCHLORIDE 10 MG/ML
INJECTION, SOLUTION EPIDURAL; INFILTRATION; INTRACAUDAL; PERINEURAL
Status: DISCONTINUED | OUTPATIENT
Start: 2022-06-24 | End: 2022-06-24 | Stop reason: HOSPADM

## 2022-06-24 RX ADMIN — INSULIN ASPART 1 UNITS: 100 INJECTION, SOLUTION INTRAVENOUS; SUBCUTANEOUS at 09:06

## 2022-06-24 RX ADMIN — ENOXAPARIN SODIUM 30 MG: 30 INJECTION SUBCUTANEOUS at 05:06

## 2022-06-24 RX ADMIN — LOSARTAN POTASSIUM 100 MG: 50 TABLET, FILM COATED ORAL at 08:06

## 2022-06-24 RX ADMIN — OXYBUTYNIN CHLORIDE 5 MG: 5 TABLET ORAL at 09:06

## 2022-06-24 RX ADMIN — HYDRALAZINE HYDROCHLORIDE 50 MG: 25 TABLET ORAL at 09:06

## 2022-06-24 RX ADMIN — INSULIN DETEMIR 15 UNITS: 100 INJECTION, SOLUTION SUBCUTANEOUS at 09:06

## 2022-06-24 RX ADMIN — COLLAGENASE SANTYL: 250 OINTMENT TOPICAL at 05:06

## 2022-06-24 RX ADMIN — MUPIROCIN: 20 OINTMENT TOPICAL at 09:06

## 2022-06-24 RX ADMIN — AMPICILLIN SODIUM AND SULBACTAM SODIUM 3 G: 2; 1 INJECTION, POWDER, FOR SOLUTION INTRAMUSCULAR; INTRAVENOUS at 09:06

## 2022-06-24 RX ADMIN — CIPROFLOXACIN 500 MG: 500 TABLET, FILM COATED ORAL at 08:06

## 2022-06-24 RX ADMIN — AMPICILLIN SODIUM AND SULBACTAM SODIUM 3 G: 2; 1 INJECTION, POWDER, FOR SOLUTION INTRAMUSCULAR; INTRAVENOUS at 01:06

## 2022-06-24 RX ADMIN — EPOETIN ALFA-EPBX 4500 UNITS: 10000 INJECTION, SOLUTION INTRAVENOUS; SUBCUTANEOUS at 04:06

## 2022-06-24 RX ADMIN — GABAPENTIN 100 MG: 100 CAPSULE ORAL at 08:06

## 2022-06-24 RX ADMIN — MORPHINE SULFATE 2 MG: 2 INJECTION, SOLUTION INTRAMUSCULAR; INTRAVENOUS at 05:06

## 2022-06-24 RX ADMIN — AMLODIPINE BESYLATE 10 MG: 5 TABLET ORAL at 08:06

## 2022-06-24 RX ADMIN — GABAPENTIN 100 MG: 100 CAPSULE ORAL at 09:06

## 2022-06-24 RX ADMIN — ASPIRIN 81 MG: 81 TABLET, COATED ORAL at 08:06

## 2022-06-24 RX ADMIN — CLOPIDOGREL BISULFATE 75 MG: 75 TABLET, FILM COATED ORAL at 11:06

## 2022-06-24 RX ADMIN — ACETAMINOPHEN 650 MG: 325 TABLET ORAL at 06:06

## 2022-06-24 RX ADMIN — MUPIROCIN: 20 OINTMENT TOPICAL at 08:06

## 2022-06-24 RX ADMIN — ATORVASTATIN CALCIUM 80 MG: 40 TABLET, FILM COATED ORAL at 09:06

## 2022-06-24 RX ADMIN — HYDRALAZINE HYDROCHLORIDE 50 MG: 25 TABLET ORAL at 08:06

## 2022-06-24 RX ADMIN — AZELASTINE HYDROCHLORIDE 137 MCG: 137 SPRAY, METERED NASAL at 09:06

## 2022-06-24 NOTE — NURSING
NPWT dressing change         Dressing removed and area cleaned  Pat dry  Applied benzoin to the mychal wound  Green foam cut to fit x3 and placed to wound  Drape and dome placed   To suction at 125mmhg and holding

## 2022-06-24 NOTE — PROGRESS NOTES
INPATIENT NEPHROLOGY Progress Note  [facility]    Patient Name: Leanna García  Date: 06/24/2022    Reason for consultation: ESRD    Chief Complaint:   Chief Complaint   Patient presents with    Foot Injury     Pt sent by MD for evaluation of her left foot.       History of Present Illness:  55 y/o F with ESRD on HD MWF, HTN, HLD, and DMII who was sent in by podiatry from [facility] for worsening ulceration and decreased blood flow to L foot with known diabetic wound despite IV antbx, recommending vascular eval and BKA, consulted for dialysis.    Interval History:  6/22- off floor  6/23- no issues with HD yest- got off 2L  6/24- gong for angiogram today with Dr. Haas    Plan of Care:    Assessment:  ESRD on HD MWF  HTN  SHPT  Anemia of CKD  Diabetic foot ulcer    Plan:    - Continue HD MWF.   - Continue UF to dry weight.  - Continue renal diet, 1.5L fluid restriction.  - Continue FERMÍN with HD.  - Dose antbx for CrCl < 10/HD. Angiogram to guide podiatry plan.    Thank you for allowing us to participate in this patient's care. We will continue to follow.    Vital Signs:  Temp Readings from Last 3 Encounters:   06/24/22 98 °F (36.7 °C) (Oral)   06/21/22 98.4 °F (36.9 °C)   06/14/22 97.9 °F (36.6 °C)       Pulse Readings from Last 3 Encounters:   06/24/22 71   06/21/22 76   06/14/22 73       BP Readings from Last 3 Encounters:   06/24/22 (!) 175/77   06/21/22 (!) 154/84   06/14/22 138/64       Weight:  Wt Readings from Last 3 Encounters:   06/21/22 89.1 kg (196 lb 6.9 oz)   06/13/22 89.9 kg (198 lb 3.1 oz)   06/02/22 86.2 kg (190 lb)       Medications:  Scheduled Meds:   amLODIPine  10 mg Oral Daily    ampicillin-sulbactim (UNASYN) IVPB  3 g Intravenous Q12H    aspirin  81 mg Oral Daily    atorvastatin  80 mg Oral QHS    ciprofloxacin HCl  500 mg Oral Daily    collagenase   Topical (Top) Daily    DAPTOmycin (CUBICIN)  IV  500 mg Intravenous Q48H    enoxaparin  30 mg Subcutaneous Daily     epoetin chris-ebpx (RETACRIT) injection  50 Units/kg Intravenous Every Mon, Wed, Fri    gabapentin  100 mg Oral BID    hydrALAZINE  50 mg Oral Q12H    insulin detemir U-100  15 Units Subcutaneous QHS    lactulose  20 g Oral BID    losartan  100 mg Oral Daily    mupirocin   Nasal BID     Continuous Infusions:  PRN Meds:.dextrose 50%, dextrose 50%, glucagon (human recombinant), glucose, glucose, hydrALAZINE, ibuprofen, insulin aspart U-100, morphine, ondansetron, sodium chloride 0.9%  No current facility-administered medications on file prior to encounter.     Current Outpatient Medications on File Prior to Encounter   Medication Sig Dispense Refill    amLODIPine (NORVASC) 10 MG tablet Take 1 tablet (10 mg total) by mouth once daily.      aspirin (ECOTRIN) 81 MG EC tablet Take 81 mg by mouth once daily.      azelastine (ASTELIN) 137 mcg (0.1 %) nasal spray 1 spray by Nasal route 2 (two) times a day.      blood sugar diagnostic Strp To check BG 4 times daily, to use with insurance preferred meter (Patient taking differently: 1 strip by Misc.(Non-Drug; Combo Route) route 4 (four) times daily. To check BG 4 times daily, to use with insurance preferred meter) 450 strip 3    calcium acetate,phosphat bind, (PHOSLO) 667 mg capsule Take 1 capsule (667 mg total) by mouth 3 (three) times daily with meals.      cetirizine (ZYRTEC) 10 MG tablet Take 1 tablet (10 mg total) by mouth every other day.      ciprofloxacin HCl (CIPRO) 500 MG tablet Take 1 tablet (500 mg total) by mouth once daily. On dialysis days give after HD. Last dose 6/29/22      fluticasone propionate (FLONASE) 50 mcg/actuation nasal spray 2 sprays (100 mcg total) by Each Nostril route once daily.      gabapentin (NEURONTIN) 100 MG capsule Take 1 capsule (100 mg total) by mouth 2 (two) times daily.      hydrALAZINE (APRESOLINE) 50 MG tablet Take 1 tablet (50 mg total) by mouth every 12 (twelve) hours.      HYDROcodone-acetaminophen (NORCO)  "5-325 mg per tablet Take 1 tablet by mouth every 6 (six) hours as needed. 15 tablet 0    insulin aspart U-100 (NOVOLOG FLEXPEN U-100 INSULIN) 100 unit/mL (3 mL) InPn pen Inject 5-8 units 3 times per day with food. 1 Box 6    insulin detemir U-100 (LEVEMIR FLEXTOUCH U-100 INSULN) 100 unit/mL (3 mL) InPn pen Inject 15 Units into the skin every evening.      lactulose (CHRONULAC) 10 gram/15 mL solution Take 20 g by mouth 2 (two) times a day.      lancets Misc To use to check blood sugar 4 times daily. To use with insurance approved meter. Patient needs the round, purple one (Patient taking differently: 1 lancet by Misc.(Non-Drug; Combo Route) route 4 (four) times daily. To use to check blood sugar 4 times daily. To use with insurance approved meter. Patient needs the round, purple one) 450 each 3    losartan (COZAAR) 100 MG tablet Take 1 tablet (100 mg total) by mouth once daily.      multivitamin (THERAGRAN) per tablet Take 1 tablet by mouth once daily.      oxybutynin (DITROPAN) 5 MG Tab Take 1 tablet (5 mg total) by mouth 3 (three) times daily.      pen needle, diabetic (BD ULTRA-FINE ROBERT PEN NEEDLE) 32 gauge x 5/32" Ndle To use 4 times per day with insulin injections. (Patient taking differently: 1 pen by Misc.(Non-Drug; Combo Route) route 4 (four) times daily. To use 4 times per day with insulin injections.) 450 each 2    polyethylene glycol (GLYCOLAX) 17 gram PwPk Take 17 g by mouth 2 (two) times daily as needed.      sodium chloride 0.9% SolP 50 mL with DAPTOmycin 500 mg SolR 500 mg Inject 500 mg into the vein every 48 hours. Until 6/29/22      [DISCONTINUED] atorvastatin (LIPITOR) 80 MG tablet Take 1 tablet (80 mg total) by mouth once daily. (Patient taking differently: Take 80 mg by mouth every evening.) 90 tablet 3    [DISCONTINUED] blood-glucose meter (ACCU-CHEK KAYLIE PLUS METER) Misc To use to check blood sugars 4 times a day 1 each 0    [DISCONTINUED] clorazepate (TRANXENE) 3.75 MG Tab Take " 7.5 mg by mouth nightly as needed.       [DISCONTINUED] dextrose (GLUCOSE GEL) 40 % gel Take 37.5 mLs (15,000 mg total) by mouth once as needed (hypoglycemia). 37.5 g 4    [DISCONTINUED] ezetimibe (ZETIA) 10 mg tablet Take 1 tablet (10 mg total) by mouth once daily.      [DISCONTINUED] fenofibrate 160 MG Tab Take 1 tablet (160 mg total) by mouth once daily. 90 tablet 3    [DISCONTINUED] lancing device with lancets (ACCU-CHEK SOFT DEV LANCETS) Kit To check blood sugars 4 times per day 400 each 3    [DISCONTINUED] pantoprazole (PROTONIX) 40 MG tablet Take 1 tablet (40 mg total) by mouth once daily.         Review of Systems:  In cath lab    Physical Exam:  In cath lab    Results:  Lab Results   Component Value Date     (L) 06/24/2022    K 4.9 06/24/2022    CL 98 06/24/2022    CO2 24 06/24/2022    BUN 65 (H) 06/24/2022    CREATININE 7.6 (H) 06/24/2022    CALCIUM 9.7 06/24/2022    ANIONGAP 12 06/24/2022    ESTGFRAFRICA 6.3 (A) 06/24/2022    EGFRNONAA 5.4 (A) 06/24/2022       Lab Results   Component Value Date    CALCIUM 9.7 06/24/2022    PHOS 5.9 (H) 05/25/2022       Recent Labs   Lab 06/24/22  0601   WBC 9.56   RBC 3.78*   HGB 9.8*   HCT 33.0*      MCV 87   MCH 25.9*   MCHC 29.7*       I have personally reviewed pertinent radiological imaging and reports.    I have spent > 35 minutes providing care for this patient for the above diagnoses. These services have included chart/data/imaging review, evaluation, exam, formulation of plan, , note preparation, and discussions with staff involved in this patient's care.    Ashleigh Soria MD MPH  [facility] [address] [address]  [phone] (p)  [phone] (f)

## 2022-06-24 NOTE — INTERVAL H&P NOTE
The patient has been examined and the H&P has been reviewed:    I concur with the findings and no changes have occurred since H&P was written.    Surgery risks, benefits and alternative options discussed and understood by patient/family.          Active Hospital Problems    Diagnosis  POA    *Diabetic ulcer of left midfoot [E11.621, L97.429]  Yes    MRSA (methicillin resistant Staphylococcus aureus) [A49.02]  Yes    Infection due to Acinetobacter species [A49.8]  Yes    Encounter for long-term (current) use of antibiotics [Z79.2]  Not Applicable    Type 2 diabetes mellitus with kidney complication, with long-term current use of insulin [E11.29, Z79.4]  Not Applicable    ESRD (end stage renal disease) [N18.6]  Yes    Chronic diastolic heart failure [I50.32]  Yes    Hypertension associated with diabetes [E11.59, I15.2]  Yes     Chronic      Resolved Hospital Problems   No resolved problems to display.

## 2022-06-24 NOTE — PROGRESS NOTES
[facility] Medicine    Progress Note    Patient Name: Leanna García  MRN: [id number]  Patient Class: IP- Inpatient   Admission Date: 6/21/2022 10:04 AM  Length of Stay: 3  Attending Physician: Conchita Douglas MD  Primary Care Provider: Stefano Lopez MD  Face-to-Face encounter date: 06/24/2022  Code status:  Chief Complaint: Foot Injury (Pt sent by MD for evaluation of her left foot.)        Subjective:    HPI:Leanna García is a 56 y.o.  female who  has a past medical history of A-fib, Arthritis, Bronchitis, Cardiovascular event risk, ASCVD 10-year risk 6.7% (10/15/2016), Diabetes mellitus, Diabetes mellitus type II, Diabetic ulcer of left midfoot (5/5/2022), Disorder of kidney and ureter, Encounter for blood transfusion, ESRD (end stage renal disease) (5/18/2018), MANJINDER (generalized anxiety disorder) (10/25/2016), History of colon polyps (11/02/2016), Hyperlipidemia, Hypertension, and Stroke.. The patient presented to [facility] on [facility]/21/2022 with a primary complaint of Foot Injury (Pt sent by MD for evaluation of her left foot.)  Patient is currently undergoing treat at [facility] and followed up with podiatrist today.  Podiatry evaluated patient and referred patient to the ED on account of worsening diabetic foot ulcer.     Interval History:   6/22: Patient is doing well. Awaiting vascular consult. No concerns/issues overnight reported by the patient or the nursing staff.    6/23: Case management consulted for placement, still awaiting vascular surgeons eval. ID aaded [email or web address]     6/24: Patient had  peripheral angiography and PTA of left popliteal artery today. WBC scan pending    Review of Systems All other Review of Systems were found to be negative expect for that mentioned already in HPI.     Objective:     Vitals:    06/24/22 1430 06/24/22 1500 06/24/22 1530 06/24/22 1600   BP: 138/69 122/65 125/69 124/68   BP Location:       Patient Position:        Pulse: 70 68 67 70   Resp:       Temp:       TempSrc:       SpO2:       Weight:       Height:            Vitals reviewed.  Constitutional: No distress.   HENT: NC  Head: Atraumatic.   Cardiovascular: Normal rate, regular rhythm and normal heart sounds.   Pulmonary/Chest: Effort normal. No wheezes.   Abdominal: Soft. Bowel sounds are normal. No distension and no mass. No tenderness  Neurological: Alert.   Skin: Skin is warm and dry.   Psych: Appropriate mood and affect    Following labs were Reviewed   CBC:  Recent Labs   Lab 06/24/22  0601   WBC 9.56   HGB 9.8*   HCT 33.0*        CMP:  Recent Labs   Lab 06/24/22  0601   CALCIUM 9.7   *   K 4.9   CO2 24   CL 98   BUN 65*   CREATININE 7.6*       Micro Results  Microbiology Results (last 7 days)     Procedure Component Value Units Date/Time    Blood culture #2 **CANNOT BE ORDERED STAT** [[id number]] Collected: 06/21/22 1155    Order Status: Completed Specimen: Blood from Peripheral, Antecubital, Right Updated: 06/24/22 1232     Blood Culture, Routine No Growth to date      No Growth to date      No Growth to date      No Growth to date    Blood culture #1 **CANNOT BE ORDERED STAT** [[id number]] Collected: 06/21/22 1143    Order Status: Completed Specimen: Blood from Peripheral, Wrist, Right Updated: 06/24/22 1232     Blood Culture, Routine No Growth to date      No Growth to date      No Growth to date      No Growth to date    Aerobic culture [[id number]]     Order Status: No result Specimen: Wound            Radiology Reports  X-Ray Chest 1 View  Result Date: 6/3/2022  No acute process Electronically signed by: Neto Bustillos MD Date:    06/03/2022 Time:    09:25    X-Ray Foot Complete Bilateral  Result Date: 6/21/2022  IMPRESSION: 1.  Bilateral degenerative changes and pes planus as described. 2.  No destructive osseous lesions observed. 3.  Ulcer in the plantar soft tissues of the left foot. 4.  Marked bilateral atherosclerosis. 5.  Amputation an  osteotomy of the left fourth digit. Electronically signed by:  Kiel Garvey DO  6/21/2022 11:30 AM CDT Workstation: [id number]    MRI Foot (Forefoot) Left Without Contrast  Result Date: 6/8/2022  Poor quality MRI due to patient motion.  While there remains an ulceration in the plantar medial soft tissues a focal abscess is not seen.  Osteomyelitis is not identified. Electronically signed by: Jose De León MD Date:    06/08/2022 Time:    18:16       Meds  Scheduled Meds:   amLODIPine  10 mg Oral Daily    ampicillin-sulbactim (UNASYN) IVPB  3 g Intravenous Q12H    aspirin  81 mg Oral Daily    atorvastatin  80 mg Oral QHS    ciprofloxacin HCl  500 mg Oral Daily    clopidogreL  75 mg Oral Daily    collagenase   Topical (Top) Daily    DAPTOmycin (CUBICIN)  IV  500 mg Intravenous Q48H    enoxaparin  30 mg Subcutaneous Daily    epoetin chris-ebpx (RETACRIT) injection  50 Units/kg Intravenous Every Mon, Wed, Fri    gabapentin  100 mg Oral BID    hydrALAZINE  50 mg Oral Q12H    insulin detemir U-100  15 Units Subcutaneous QHS    lactulose  20 g Oral BID    losartan  100 mg Oral Daily    mupirocin   Nasal BID     Continuous Infusions:  PRN Meds:.acetaminophen, dextrose 50%, dextrose 50%, glucagon (human recombinant), glucose, glucose, hydrALAZINE, ibuprofen, insulin aspart U-100, morphine, ondansetron, ondansetron, sodium chloride 0.9%.    Assessment & Plan:      *Diabetic ulcer of left midfoot  Morphine 2mg q.4h p.r.n. for pain  Continue IV ABx  Podiatry, Wound Care, ID, Vascular surg on board  Wound care and cultures       Type 2 diabetes mellitus with kidney complication, with long-term current use of insulin  Continue Levemir 15 units nightly  Insulin sliding scale  Regular accuchek       ESRD (end stage renal disease)  Consult nephrology for dialysis       Hypertension associated with diabetes  Resume home medication of hydralazine 50mg BID, losartan 100mg, amlodipine 10mg daily and titrate  upwards as needed      PAD  Angiography today  Atorvastatin 40 mg daily  Aspirin 81 mg daily               Discharge Planning:   Is the patient medically ready for discharge?: no    Reason for patient still in hospital (select all that apply): Patient trending condition and Treatment    Above encounter included review of the medical records, interviewing and examining the patient face-to-face, discussion with family and other health care providers, ordering and interpreting lab/test results and formulating a plan of care.     Medical Decision Making:      [_] Low Complexity  [_] Moderate Complexity  [x] High Complexity      Conchita Douglas MD  Department of Hospital Medicine   Formerly [facility], [facility]

## 2022-06-24 NOTE — OP NOTE
[facility]  Surgery Department  Operative Note    SUMMARY     Date of Procedure: 6/24/2022     Procedure: Procedure(s) (LRB):  Angiogram, Abdominal Aorta W/ Extremity Runoff (N/A)  PTA, Superficial Femoral Artery     Surgeon(s) and Role:      * Jose Haas MD - Primary    Assisting Surgeon: None    Pre-Operative Diagnosis: Diabetic foot ulcer [E11.621, L97.509]    Post-Operative Diagnosis: Post-Op Diagnosis Codes:     * Diabetic foot ulcer [E11.621, L97.509]    Anesthesia: RN IV Sedation    Operative Findings (including complications, if any):  Left proximal popliteal 90% stenosis with anterior tibial runoff to the foot.  Right peroneal runoff to the foot via the anterior tibial reconstitution    Description of Technical Procedures:  Abdominal aortogram bilateral lower extremity angiogram.  Left popliteal angioplasty with 4 x 40 AngioSculpt balloon    Right femoral artery accessed over the femoral head guidewire passed and 5 Maltese sheath placed catheter advanced over the wire up to the abdominal aorta abdominal aortogram per formed and the catheter was pulled down to the bifurcation angiogram the iliac arteries was performed we selected the left iliac and left lower extremity angiogram was performed we performed a right lower extremity angiogram through the sheath.  Findings are patent abdominal aorta common internal external iliac arteries are patent the right common femoral is patent profunda is patent right SFA is patent with some moderate stenosis in the proximal popliteal the right distal popliteal is patent the peroneal artery is the sole runoff to the lower leg with a large collateral feeding the dorsalis pedis.  The left common femoral profunda and SFA are patent in the proximal popliteal there was a 90% focal stenosis.  The popliteal is patent distally the anterior tibial artery runs off to the foot.  The peroneal tapers off in the mid calf the posterior tib is not visualized.  We then  went up and over the bifurcation with a 6 [place] sheath.  Cross the popliteal stenosis on the left side with a regularly wire.  4 x 40 AngioSculpt balloon angioplasty was performed for a minute at 8 atmospheres.  Repeat angiogram showed excellent flow through the popliteal no residual stenosis no perforation or flow limiting dissection.  Rapid runoff through the anterior tib to the foot.  Sheath was pulled and pressure was held.    Significant Surgical Tasks Conducted by the Assistant(s), if Applicable:     Estimated Blood Loss (EBL): * No values recorded between 6/24/2022  9:54 AM and 6/24/2022 10:14 AM *           Implants: * No implants in log *    Specimens:   Specimen (24h ago, onward)            None                  Condition: Good    Disposition: PACU - hemodynamically stable.    Attestation: I was present and scrubbed for the entire procedure.

## 2022-06-24 NOTE — PROGRESS NOTES
Consent and Hep b status verified, removed 3000 uf net, no issues with access, post thrill and bruit noted, patient stable throughout treatment. Report to Abi.     06/24/22 9290   Handoff Report   Received From Karina   Given To Abi   Vital Signs   Temp 98.4 °F (36.9 °C)   Temp src Oral   Pulse 65   Heart Rate Source Monitor   Resp 18   SpO2 97 %   Pulse Oximetry Type Intermittent   Oximetry Probe Site Applied   O2 Device (Oxygen Therapy) room air   /67   BP Location Right arm   BP Method Automatic   Patient Position Lying   Assessments (Pre/Post)   Consent Obtained yes   Safety vein preservation armband present   Date Hepatitis Profile Obtained 03/10/22   Blood Liters Processed (BLP) 58.5   Transport Modality bed   Level of Consciousness (AVPU) alert   Dialyzer Clearance mildly streaked   Pain   Preferred Pain Scale number (Numeric Rating Pain Scale)   Comfort/Acceptable Pain Level 2   Pain Rating (0-10): Rest 0   Pain Management Interventions quiet environment facilitated   Pain/Comfort Interventions   Fever Reduction/Comfort Measures lightweight bedding;lightweight clothing   Pre-Hemodialysis Assessment   Additional Dialysis Information Needed Yes   Patient Status Departed   Treatment Consent Verified Yes   Treatment Status Completed        Hemodialysis AV Fistula Left upper arm   No Placement Date or Time found.   Present Prior to Hospital Arrival?: Yes  Location: Left upper arm   Site Assessment Clean;Dry;Intact   Patency Present;Thrill;Bruit   Status Deaccessed   Dressing Intervention First dressing   Dressing Status Clean;Dry;Intact   Site Condition No complications   Dressing Gauze   During Hemodialysis Assessment   Blood Flow Rate (mL/min) 350 mL/min   Dialysate Flow Rate (mL/min) 700 ml/min   Ultrafiltration Rate (mL/Hr) 1170 mL/Hr   Arteriovenous Lines Secure Yes   Arterial Pressure (mmHg) -190 mmHg   Venous Pressure (mmHg) 180   Blood Volume Processed (Liters) 58.5 L   UF Removed (mL) 3500  mL   TMP 20   Venous Line in Air Detector Yes   Intake (mL) 250 mL   Transducer Dry Yes   Access Visible Yes   Heparin given? N/A   Intra-Hemodialysis Comments tx completed   Post-Hemodialysis Assessment   Rinseback Volume (mL) 250 mL   Blood Volume Processed (Liters) 58.5 L   Dialyzer Clearance Lightly streaked   Duration of Treatment 180 minutes   Additional Fluid Intake (mL) 500 mL   Total UF (mL) 3500 mL   Net Fluid Removal 3000   Patient Response to Treatment debbi   Post-Treatment Weight 86 kg (189 lb 9.5 oz)   Treatment Weight Change -3.1   Arterial bleeding stop time (min) 5 min   Venous bleeding stop time (min) 5 min   Post-Hemodialysis Comments stable   Edema   Edema foot, left

## 2022-06-24 NOTE — NURSING TRANSFER
Nursing Transfer Note      6/24/2022     Reason patient is being transferred: Inpatient    Transfer To: 1107    Transfer via stretcher    Transfer with cardiac monitoring    Transported by [facility]    Medicines sent: no    Any special needs or follow-up needed: no    Chart send with patient: Yes    Notified: spouse    Patient reassessed at: 6/24/22  1245 (date, time)    Upon arrival to floor: cardiac monitor applied, patient oriented to room, call bell in reach and bed in lowest position.     Pt is stable site is clean, dry, and intact.

## 2022-06-24 NOTE — PROGRESS NOTES
"[facility]  Adult Nutrition   Progress Note (Initial Assessment)     SUMMARY     Recommendations  Recommendation/Intervention: 1. Advance diet as tolerated to Diabetic 1800 kcal, Renal. 2. Re-start Wilman and Nepro TID for healing.  Goals: 1. Patient to consume >/= 75% estimated needs. 2. Patient labs will trend to target range.  Nutrition Goal Status: new    Dietitian Rounds Brief  RD screen for risk-- wound. Left foot PAD, diabetic foot ulcer. Vascular surgeon consulted. Patient sleeping, getting HD at visit. Patient had cath this am. Patient appears stated age, no overt signs of malnutrition. Weight fluctuations likely fluid related. RD to monitor for diet advancement and po intake PRN.    Diet order:   Current Diet Order: NPO   Oral Nutrition Supplement: Wilman TID and Nepro TID; restart when diet advances.     Evaluation of Received Nutrient/Fluid Intake  Energy Calories Required: not meeting needs  Protein Required: not meeting needs  Fluid Required: not meeting needs  Tolerance: tolerating     % Intake of Estimated Energy Needs: 0 - 25 % and 25 - 50 %  % Meal Intake: 25 - 50 %    No intake or output data in the 24 hours ending 06/24/22 1025     Anthropometrics  Temp: 98 °F (36.7 °C)  Height Method: Stated  Height: 5' 8" (172.7 cm)  Height (inches): 68 in  Weight Method: Bed Scale  Weight: 89.1 kg (196 lb 6.9 oz)  Weight (lb): 196.43 lb  Ideal Body Weight (IBW), Female: 140 lb  % Ideal Body Weight, Female (lb): 140.31 %  BMI (Calculated): 29.9  BMI Grade: 25 - 29.9 - overweight       Estimated/Assessed Needs  Weight Used For Calorie Calculations: 89.1 kg (196 lb 6.9 oz)  Energy Calorie Requirements (kcal): 0905-9840 kcal/day (20-25 kcal/kg)     Protein Requirements: 107-133 gm/day (1.2-1.5 gm/kg)  Weight Used For Protein Calculations: 89.1 kg (196 lb 6.9 oz)  Fluid Requirements (mL): 1782 ml/day or per MD  Estimated Fluid Requirement Method: RDA Method  RDA Method (mL): 1782       Reason for " Assessment  Reason For Assessment: identified at risk by screening criteria (diabetic foot ulcer)  Relevant Medical History: ESRD- on HD, T2DM, HTN, CHF, MANJINDER, iron deficiency anemia, TIA  Interdisciplinary Rounds: did not attend    Nutrition/Diet History  Spiritual, Cultural Beliefs, Evangelical Practices, Values that Affect Care: no  Food Allergies: NKFA  Factors Affecting Nutritional Intake: NPO    Nutrition Risk Screen  Nutrition Risk Screen: large or nonhealing wound, burn or pressure injury     MST Score: 0  Have you recently lost weight without trying?: No  Weight loss score: 0  Have you been eating poorly because of a decreased appetite?: No  Appetite score: 0       Weight History:  Wt Readings from Last 5 Encounters:   06/21/22 89.1 kg (196 lb 6.9 oz)   06/13/22 89.9 kg (198 lb 3.1 oz)   06/02/22 86.2 kg (190 lb)   05/16/22 91.8 kg (202 lb 6.1 oz)   05/06/22 86.6 kg (191 lb)        Lab/Procedures/Meds: Pertinent Labs/Meds Reviewed    Medications:Pertinent Medications Reviewed  Scheduled Meds:   amLODIPine  10 mg Oral Daily    ampicillin-sulbactim (UNASYN) IVPB  3 g Intravenous Q12H    aspirin  81 mg Oral Daily    atorvastatin  80 mg Oral QHS    ciprofloxacin HCl  500 mg Oral Daily    clopidogreL  75 mg Oral Daily    collagenase   Topical (Top) Daily    DAPTOmycin (CUBICIN)  IV  500 mg Intravenous Q48H    enoxaparin  30 mg Subcutaneous Daily    epoetin chris-ebpx (RETACRIT) injection  50 Units/kg Intravenous Every Mon, Wed, Fri    gabapentin  100 mg Oral BID    hydrALAZINE  50 mg Oral Q12H    insulin detemir U-100  15 Units Subcutaneous QHS    lactulose  20 g Oral BID    losartan  100 mg Oral Daily    mupirocin   Nasal BID     Continuous Infusions:  PRN Meds:.acetaminophen, dextrose 50%, dextrose 50%, fentaNYL, glucagon (human recombinant), glucose, glucose, hydrALAZINE, ibuprofen, insulin aspart U-100, LIDOcaine (PF) 10 mg/ml (1%), midazolam, morphine, ondansetron, ondansetron, sodium chloride  0.9%    Labs: Pertinent Labs Reviewed  Clinical Chemistry:  Recent Labs   Lab 06/21/22  1143 06/24/22  0601   * 134*   K 4.6 4.9   CL 92* 98   CO2 31* 24   * 113*   BUN 44* 65*   CREATININE 5.8* 7.6*   CALCIUM 10.0 9.7   PROT 8.1  --    ALBUMIN 3.4*  --    BILITOT 0.4  --    ALKPHOS 67  --    AST 15  --    ALT 11  --    ANIONGAP 11 12   ESTGFRAFRICA 8.7* 6.3*   EGFRNONAA 7.5* 5.4*   MG 2.5 2.7*    < > = values in this interval not displayed.     CBC:   Recent Labs   Lab 06/24/22  0601   WBC 9.56   RBC 3.78*   HGB 9.8*   HCT 33.0*      MCV 87   MCH 25.9*   MCHC 29.7*     Cardiac Profile:  Recent Labs   Lab 06/22/22  0618   CPK 31     Inflammatory Labs:  Recent Labs   Lab 06/22/22  0618 06/23/22  0521 06/24/22  0601   CRP 5.28* 4.68* 5.52*     Diabetes:  Recent Labs   Lab 06/22/22  0618   HGBA1C 7.5*     Monitor and Evaluation  Food and Nutrient Intake: energy intake, food and beverage intake  Food and Nutrient Adminstration: diet order  Knowledge/Beliefs/Attitudes: food and nutrition knowledge/skill  Physical Activity and Function: nutrition-related ADLs and IADLs  Anthropometric Measurements: weight, weight change, body mass index  Biochemical Data, Medical Tests and Procedures: electrolyte and renal panel, lipid profile, gastrointestinal profile, glucose/endocrine profile, inflammatory profile  Nutrition-Focused Physical Findings: overall appearance     Nutrition Risk  Level of Risk/Frequency of Follow-up: moderate - high     Nutrition Follow-Up  RD Follow-up?: Yes      Sonia Christiansen RD, LDN 06/24/2022 10:25 AM

## 2022-06-24 NOTE — PROGRESS NOTES
6/21/22  Chart reviewed, cultures and [facility] ID notes, podiatry notes and available photos.   unasyn added to cover acinetobacter  Will see in person 6/22 6/22/22  Consult Note  Infectious Disease    Reason for Consult:  Diabetic foot infection, PAD    HPI: Leanna García is a 56 y.o. female known to me from long hospitalization in May at which time she was treated for bacillus serious bacteremia from a food-borne infection and for MRSA infection of the left medial foot which required several debridements and prolonged IV antibiotics.  MRIs were negative for osteomyelitis and she was transferred to [facility] where she was until 6/14.  One 6/7 she saw Dr. Bruno in the wound clinic and a culture was obtained growing Acinetobacter.  MRI on 06/08 of the forefoot was negative.  Her antibiotics were adjusted by ID at the [facility] and the at the time of her transfer from [facility] to the nursing home she was on daptomycin and oral Cipro.  She was seen in Wound Care Clinic yesterday and felt to have not had adequate improvement in was referred to the hospital for admission.  Amputation was recommended but declined by the patient.  She has a history of poorly controlled diabetes and her A1c which was greater than 14% earlier this year was most recently 8% in May.  She does not currently have a fever or leukocytosis in compared to my last examination of her foot the medial foot wound is smaller and shallower but the left great toe is erythematous and bruised appearing.  She suffered deep tissue injury to the left heel while hospitalized here in May and now has a hard eschar over the left heel.    6/23: interim reviewed. Vascular consult pending, CRP lower.  wanted to clarify that they did not refuse an amputation, and have come to terms if she absolutely needs it, but want to pursue as many options possible to avoid this. The great toe may require amputation however and they understand this.   6/24:  Interim reviewed.   Afebrile.  Status post peripheral angiography and PTA of left popliteal artery.  Patient  are hopeful that this will foot improve.  Discussed much of her vascular disease is small vessel related.    EXAM & DIAGNOSTICS REVIEWED:   Vitals:     Temp:  [98 °F (36.7 °C)-98.6 °F (37 °C)]   Temp: 98.6 °F (37 °C) (06/24/22 1307)  Pulse: 66 (06/24/22 1325)  Resp: 19 (06/24/22 1307)  BP: 132/69 (06/24/22 1325)  SpO2: 96 % (06/24/22 1307)  No intake or output data in the 24 hours ending 06/24/22 1421    General:  In NAD. Alert and attentive, cooperative, comfortable. Has less facial and scleral puffiness  Eyes:  Anicteric,   EOMI. Scleral puffiness is improved  ENT:  No ulcers, exudates, thrush, nares patent,    Neck:  Supple,   Lungs:    Heart:     Abd:     :  Voids   Musc:  Joints without effusion, swelling, erythema, synovitis,  .   Skin:  No rashes.   Neuro:             Alert, attentive, speech fluent, face symmetric, moves all extremities, no focal weakness. Ambulatory  Psych:  Calm, cooperative  Lymphatic:      Extrem: No pitting edema,   Warm. See photos below  VAD:     Right arm midline, not working?  Isolation:  contact  Wound:   The left  Foot medial wound is smaller and shallower. The left great toe is erythematous, peeling, ?bruised  There is a dense black eschar left heel    6/23: the great toe ulcer is full thickness, part necrotic slough, with cellulitis is a little better.              General Labs reviewed:  Recent Labs   Lab 06/21/22  1143 06/22/22  0618 06/24/22  0601   WBC 8.92 8.67 9.56   HGB 10.7* 8.6* 9.8*   HCT 36.2* 28.9* 33.0*    350 358       Recent Labs   Lab 06/21/22  1143 06/22/22  0618 06/23/22  0521 06/24/22  0601   * 139 136 134*   K 4.6 4.6 4.7 4.9   CL 92* 98 102 98   CO2 31* 30* 25 24   BUN 44* 57* 34* 65*   CREATININE 5.8* 7.6* 5.7* 7.6*   CALCIUM 10.0 9.4 9.2 9.7   PROT 8.1  --   --   --    BILITOT 0.4  --   --   --    ALKPHOS 67  --   --   --    ALT 11  --   --   --     AST 15  --   --   --      Recent Labs   Lab 06/22/22  0618 06/23/22  0521 06/24/22  0601   CRP 5.28* 4.68* 5.52*         Micro:  Microbiology Results (last 7 days)     Procedure Component Value Units Date/Time    Blood culture #2 **CANNOT BE ORDERED STAT** [[id number]] Collected: 06/21/22 1155    Order Status: Completed Specimen: Blood from Peripheral, Antecubital, Right Updated: 06/24/22 1232     Blood Culture, Routine No Growth to date      No Growth to date      No Growth to date      No Growth to date    Blood culture #1 **CANNOT BE ORDERED STAT** [[id number]] Collected: 06/21/22 1143    Order Status: Completed Specimen: Blood from Peripheral, Wrist, Right Updated: 06/24/22 1232     Blood Culture, Routine No Growth to date      No Growth to date      No Growth to date      No Growth to date    Aerobic culture [[id number]]     Order Status: No result Specimen: Wound           Imaging Reviewed:  5/6/22 arterial US  1. No arterial occlusion or hemodynamically significant stenosis identified.  2. Diffuse bilateral lower extremity peripheral arterial disease.    MRI left forefoot 6/8 negative    Foot xray      Cardiology:    IMPRESSION & PLAN   1. Chronic left foot wounds, s/p abscess with MRSA, and clinically not infected   Left great toe is erythematous, and left heel has eschar due to decubitus   Not making much progress, but would not call it non-salvageable     2. Poorly controlled diabetes, improved  3. PAD, small vessel presumed  4. ESRD, may need more volume removed  5. Recent h/o Bacillus cereus bacteremia      Recommendations:  Continue daptomycin and unasyn   WBC scan has been ordered by Dr. Bruno       Medical Decision Making during this encounter was  [_] Low Complexity  [_] Moderate Complexity  [ xx ] High Complexity  D/w patient and

## 2022-06-25 LAB
ANION GAP SERPL CALC-SCNC: 7 MMOL/L (ref 8–16)
BUN SERPL-MCNC: 40 MG/DL (ref 6–20)
CALCIUM SERPL-MCNC: 9.1 MG/DL (ref 8.7–10.5)
CHLORIDE SERPL-SCNC: 101 MMOL/L (ref 95–110)
CO2 SERPL-SCNC: 26 MMOL/L (ref 23–29)
CREAT SERPL-MCNC: 5.5 MG/DL (ref 0.5–1.4)
CRP SERPL-MCNC: 5.34 MG/DL
ERYTHROCYTE [DISTWIDTH] IN BLOOD BY AUTOMATED COUNT: 16.6 % (ref 11.5–14.5)
EST. GFR  (AFRICAN AMERICAN): 9.3 ML/MIN/1.73 M^2
EST. GFR  (NON AFRICAN AMERICAN): 8 ML/MIN/1.73 M^2
GLUCOSE SERPL-MCNC: 126 MG/DL (ref 70–110)
GLUCOSE SERPL-MCNC: 138 MG/DL (ref 70–110)
GLUCOSE SERPL-MCNC: 150 MG/DL (ref 70–110)
GLUCOSE SERPL-MCNC: 156 MG/DL (ref 70–110)
GLUCOSE SERPL-MCNC: 206 MG/DL (ref 70–110)
HCT VFR BLD AUTO: 30.7 % (ref 37–48.5)
HGB BLD-MCNC: 9.2 G/DL (ref 12–16)
MAGNESIUM SERPL-MCNC: 2.5 MG/DL (ref 1.6–2.6)
MCH RBC QN AUTO: 26.1 PG (ref 27–31)
MCHC RBC AUTO-ENTMCNC: 30 G/DL (ref 32–36)
MCV RBC AUTO: 87 FL (ref 82–98)
PLATELET # BLD AUTO: 318 K/UL (ref 150–450)
PMV BLD AUTO: 9.9 FL (ref 9.2–12.9)
POTASSIUM SERPL-SCNC: 5.1 MMOL/L (ref 3.5–5.1)
RBC # BLD AUTO: 3.52 M/UL (ref 4–5.4)
SODIUM SERPL-SCNC: 134 MMOL/L (ref 136–145)
WBC # BLD AUTO: 9.69 K/UL (ref 3.9–12.7)

## 2022-06-25 PROCEDURE — 25000003 PHARM REV CODE 250: Performed by: SURGERY

## 2022-06-25 PROCEDURE — 63600175 PHARM REV CODE 636 W HCPCS: Performed by: SURGERY

## 2022-06-25 PROCEDURE — 99231 SBSQ HOSP IP/OBS SF/LOW 25: CPT | Mod: ,,, | Performed by: INTERNAL MEDICINE

## 2022-06-25 PROCEDURE — 86140 C-REACTIVE PROTEIN: CPT | Performed by: SURGERY

## 2022-06-25 PROCEDURE — 25000242 PHARM REV CODE 250 ALT 637 W/ HCPCS: Performed by: SURGERY

## 2022-06-25 PROCEDURE — 12000002 HC ACUTE/MED SURGE SEMI-PRIVATE ROOM

## 2022-06-25 PROCEDURE — 25000003 PHARM REV CODE 250: Performed by: STUDENT IN AN ORGANIZED HEALTH CARE EDUCATION/TRAINING PROGRAM

## 2022-06-25 PROCEDURE — 36415 COLL VENOUS BLD VENIPUNCTURE: CPT | Performed by: SURGERY

## 2022-06-25 PROCEDURE — 99231 PR SUBSEQUENT HOSPITAL CARE,LEVL I: ICD-10-PCS | Mod: ,,, | Performed by: INTERNAL MEDICINE

## 2022-06-25 PROCEDURE — 80048 BASIC METABOLIC PNL TOTAL CA: CPT | Performed by: STUDENT IN AN ORGANIZED HEALTH CARE EDUCATION/TRAINING PROGRAM

## 2022-06-25 PROCEDURE — 63600175 PHARM REV CODE 636 W HCPCS: Performed by: STUDENT IN AN ORGANIZED HEALTH CARE EDUCATION/TRAINING PROGRAM

## 2022-06-25 PROCEDURE — 83735 ASSAY OF MAGNESIUM: CPT | Performed by: STUDENT IN AN ORGANIZED HEALTH CARE EDUCATION/TRAINING PROGRAM

## 2022-06-25 PROCEDURE — 85027 COMPLETE CBC AUTOMATED: CPT | Performed by: SURGERY

## 2022-06-25 PROCEDURE — 82962 GLUCOSE BLOOD TEST: CPT

## 2022-06-25 RX ADMIN — GABAPENTIN 100 MG: 100 CAPSULE ORAL at 08:06

## 2022-06-25 RX ADMIN — INSULIN DETEMIR 15 UNITS: 100 INJECTION, SOLUTION SUBCUTANEOUS at 08:06

## 2022-06-25 RX ADMIN — OXYBUTYNIN CHLORIDE 5 MG: 5 TABLET ORAL at 08:06

## 2022-06-25 RX ADMIN — COLLAGENASE SANTYL: 250 OINTMENT TOPICAL at 09:06

## 2022-06-25 RX ADMIN — CALCIUM ACETATE 667 MG: 667 CAPSULE ORAL at 04:06

## 2022-06-25 RX ADMIN — AMPICILLIN SODIUM AND SULBACTAM SODIUM 3 G: 2; 1 INJECTION, POWDER, FOR SOLUTION INTRAMUSCULAR; INTRAVENOUS at 08:06

## 2022-06-25 RX ADMIN — MUPIROCIN: 20 OINTMENT TOPICAL at 08:06

## 2022-06-25 RX ADMIN — LACTULOSE 20 G: 20 SOLUTION ORAL at 09:06

## 2022-06-25 RX ADMIN — ATORVASTATIN CALCIUM 80 MG: 40 TABLET, FILM COATED ORAL at 08:06

## 2022-06-25 RX ADMIN — OXYBUTYNIN CHLORIDE 5 MG: 5 TABLET ORAL at 04:06

## 2022-06-25 RX ADMIN — FLUTICASONE PROPIONATE 100 MCG: 50 SPRAY, METERED NASAL at 09:06

## 2022-06-25 RX ADMIN — AZELASTINE HYDROCHLORIDE 137 MCG: 137 SPRAY, METERED NASAL at 08:06

## 2022-06-25 RX ADMIN — ENOXAPARIN SODIUM 30 MG: 30 INJECTION SUBCUTANEOUS at 04:06

## 2022-06-25 RX ADMIN — HYDRALAZINE HYDROCHLORIDE 50 MG: 25 TABLET ORAL at 09:06

## 2022-06-25 RX ADMIN — AMLODIPINE BESYLATE 10 MG: 5 TABLET ORAL at 09:06

## 2022-06-25 RX ADMIN — AZELASTINE HYDROCHLORIDE 137 MCG: 137 SPRAY, METERED NASAL at 09:06

## 2022-06-25 RX ADMIN — GABAPENTIN 100 MG: 100 CAPSULE ORAL at 09:06

## 2022-06-25 RX ADMIN — CETIRIZINE HYDROCHLORIDE 10 MG: 10 TABLET, FILM COATED ORAL at 09:06

## 2022-06-25 RX ADMIN — OXYBUTYNIN CHLORIDE 5 MG: 5 TABLET ORAL at 09:06

## 2022-06-25 RX ADMIN — MUPIROCIN: 20 OINTMENT TOPICAL at 09:06

## 2022-06-25 RX ADMIN — AMPICILLIN SODIUM AND SULBACTAM SODIUM 3 G: 2; 1 INJECTION, POWDER, FOR SOLUTION INTRAMUSCULAR; INTRAVENOUS at 09:06

## 2022-06-25 RX ADMIN — HYDRALAZINE HYDROCHLORIDE 50 MG: 25 TABLET ORAL at 08:06

## 2022-06-25 RX ADMIN — LOSARTAN POTASSIUM 100 MG: 50 TABLET, FILM COATED ORAL at 09:06

## 2022-06-25 RX ADMIN — DAPTOMYCIN 500 MG: 500 INJECTION, POWDER, LYOPHILIZED, FOR SOLUTION INTRAVENOUS at 10:06

## 2022-06-25 RX ADMIN — CALCIUM ACETATE 667 MG: 667 CAPSULE ORAL at 09:06

## 2022-06-25 RX ADMIN — CLOPIDOGREL BISULFATE 75 MG: 75 TABLET, FILM COATED ORAL at 09:06

## 2022-06-25 RX ADMIN — ASPIRIN 81 MG: 81 TABLET, COATED ORAL at 09:06

## 2022-06-25 RX ADMIN — CIPROFLOXACIN 500 MG: 500 TABLET, FILM COATED ORAL at 09:06

## 2022-06-25 NOTE — PROGRESS NOTES
INPATIENT NEPHROLOGY Progress Note  [facility]    Patient Name: Leanna García  Date: 06/25/2022    Reason for consultation: ESRD    Chief Complaint:   Chief Complaint   Patient presents with    Foot Injury     Pt sent by MD for evaluation of her left foot.       History of Present Illness:  55 y/o F with ESRD on HD MWF, HTN, HLD, and DMII who was sent in by podiatry from [facility] for worsening ulceration and decreased blood flow to L foot with known diabetic wound despite IV antbx, recommending vascular eval and BKA, consulted for dialysis.    Interval History:  6/22- off floor  6/23- no issues with HD yest- got off 2L  6/24- gong for angiogram today with Dr. Haas  6/25- had PTA of the L popliteal artery, no issues with HD yest- got off 3L    Plan of Care:    Assessment:  ESRD on HD MWF  HTN  SHPT  Anemia of CKD  Diabetic foot ulcer; PVD s/p PTA L popliteal artery    Plan:    - Continue HD MWF.   - Continue UF to dry weight.  - Continue renal diet, 1.5L fluid restriction.  - Continue FERMÍN with HD.  - Dose antbx for CrCl < 10/HD. Angiogram reviewed.    Thank you for allowing us to participate in this patient's care. We will continue to follow.    Vital Signs:  Temp Readings from Last 3 Encounters:   06/25/22 98.7 °F (37.1 °C) (Oral)   06/21/22 98.4 °F (36.9 °C)   06/14/22 97.9 °F (36.6 °C)       Pulse Readings from Last 3 Encounters:   06/25/22 74   06/21/22 76   06/14/22 73       BP Readings from Last 3 Encounters:   06/25/22 117/60   06/21/22 (!) 154/84   06/14/22 138/64       Weight:  Wt Readings from Last 3 Encounters:   06/21/22 89.1 kg (196 lb 6.9 oz)   06/13/22 89.9 kg (198 lb 3.1 oz)   06/02/22 86.2 kg (190 lb)       Medications:  Scheduled Meds:   amLODIPine  10 mg Oral Daily    ampicillin-sulbactim (UNASYN) IVPB  3 g Intravenous Q12H    aspirin  81 mg Oral Daily    atorvastatin  80 mg Oral QHS    azelastine  1 spray Nasal BID    calcium acetate(phosphat bind)  667 mg Oral  TID WM    cetirizine  10 mg Oral Every other day    ciprofloxacin HCl  500 mg Oral Daily    clopidogreL  75 mg Oral Daily    collagenase   Topical (Top) Daily    DAPTOmycin (CUBICIN)  IV  500 mg Intravenous Q48H    enoxaparin  30 mg Subcutaneous Daily    epoetin chris-ebpx (RETACRIT) injection  50 Units/kg Intravenous Every Mon, Wed, Fri    fluticasone propionate  2 spray Each Nostril Daily    gabapentin  100 mg Oral BID    hydrALAZINE  50 mg Oral Q12H    insulin detemir U-100  15 Units Subcutaneous QHS    lactulose  20 g Oral BID    losartan  100 mg Oral Daily    mupirocin   Nasal BID    oxybutynin  5 mg Oral TID     Continuous Infusions:  PRN Meds:.acetaminophen, dextrose 50%, dextrose 50%, glucagon (human recombinant), glucose, glucose, hydrALAZINE, ibuprofen, insulin aspart U-100, morphine, ondansetron, ondansetron, sodium chloride 0.9%  No current facility-administered medications on file prior to encounter.     Current Outpatient Medications on File Prior to Encounter   Medication Sig Dispense Refill    amLODIPine (NORVASC) 10 MG tablet Take 1 tablet (10 mg total) by mouth once daily.      aspirin (ECOTRIN) 81 MG EC tablet Take 81 mg by mouth once daily.      azelastine (ASTELIN) 137 mcg (0.1 %) nasal spray 1 spray by Nasal route 2 (two) times a day.      blood sugar diagnostic Strp To check BG 4 times daily, to use with insurance preferred meter (Patient taking differently: 1 strip by Misc.(Non-Drug; Combo Route) route 4 (four) times daily. To check BG 4 times daily, to use with insurance preferred meter) 450 strip 3    calcium acetate,phosphat bind, (PHOSLO) 667 mg capsule Take 1 capsule (667 mg total) by mouth 3 (three) times daily with meals.      cetirizine (ZYRTEC) 10 MG tablet Take 1 tablet (10 mg total) by mouth every other day.      ciprofloxacin HCl (CIPRO) 500 MG tablet Take 1 tablet (500 mg total) by mouth once daily. On dialysis days give after HD. Last dose 6/29/22       "fluticasone propionate (FLONASE) 50 mcg/actuation nasal spray 2 sprays (100 mcg total) by Each Nostril route once daily.      gabapentin (NEURONTIN) 100 MG capsule Take 1 capsule (100 mg total) by mouth 2 (two) times daily.      hydrALAZINE (APRESOLINE) 50 MG tablet Take 1 tablet (50 mg total) by mouth every 12 (twelve) hours.      HYDROcodone-acetaminophen (NORCO) 5-325 mg per tablet Take 1 tablet by mouth every 6 (six) hours as needed. 15 tablet 0    insulin aspart U-100 (NOVOLOG FLEXPEN U-100 INSULIN) 100 unit/mL (3 mL) InPn pen Inject 5-8 units 3 times per day with food. 1 Box 6    insulin detemir U-100 (LEVEMIR FLEXTOUCH U-100 INSULN) 100 unit/mL (3 mL) InPn pen Inject 15 Units into the skin every evening.      lactulose (CHRONULAC) 10 gram/15 mL solution Take 20 g by mouth 2 (two) times a day.      lancets Misc To use to check blood sugar 4 times daily. To use with insurance approved meter. Patient needs the round, purple one (Patient taking differently: 1 lancet by Misc.(Non-Drug; Combo Route) route 4 (four) times daily. To use to check blood sugar 4 times daily. To use with insurance approved meter. Patient needs the round, purple one) 450 each 3    losartan (COZAAR) 100 MG tablet Take 1 tablet (100 mg total) by mouth once daily.      multivitamin (THERAGRAN) per tablet Take 1 tablet by mouth once daily.      oxybutynin (DITROPAN) 5 MG Tab Take 1 tablet (5 mg total) by mouth 3 (three) times daily.      pen needle, diabetic (BD ULTRA-FINE ROBERT PEN NEEDLE) 32 gauge x 5/32" Ndle To use 4 times per day with insulin injections. (Patient taking differently: 1 pen by Misc.(Non-Drug; Combo Route) route 4 (four) times daily. To use 4 times per day with insulin injections.) 450 each 2    polyethylene glycol (GLYCOLAX) 17 gram PwPk Take 17 g by mouth 2 (two) times daily as needed.      sodium chloride 0.9% SolP 50 mL with DAPTOmycin 500 mg SolR 500 mg Inject 500 mg into the vein every 48 hours. Until " 6/29/22      [DISCONTINUED] atorvastatin (LIPITOR) 80 MG tablet Take 1 tablet (80 mg total) by mouth once daily. (Patient taking differently: Take 80 mg by mouth every evening.) 90 tablet 3    [DISCONTINUED] blood-glucose meter (ACCU-CHEK KAYLIE PLUS METER) Misc To use to check blood sugars 4 times a day 1 each 0    [DISCONTINUED] clorazepate (TRANXENE) 3.75 MG Tab Take 7.5 mg by mouth nightly as needed.       [DISCONTINUED] dextrose (GLUCOSE GEL) 40 % gel Take 37.5 mLs (15,000 mg total) by mouth once as needed (hypoglycemia). 37.5 g 4    [DISCONTINUED] ezetimibe (ZETIA) 10 mg tablet Take 1 tablet (10 mg total) by mouth once daily.      [DISCONTINUED] fenofibrate 160 MG Tab Take 1 tablet (160 mg total) by mouth once daily. 90 tablet 3    [DISCONTINUED] lancing device with lancets (ACCU-CHEK SOFT DEV LANCETS) Kit To check blood sugars 4 times per day 400 each 3    [DISCONTINUED] pantoprazole (PROTONIX) 40 MG tablet Take 1 tablet (40 mg total) by mouth once daily.         Review of Systems:  Neg    Physical Exam:  Constitutional: nad, aao x 3  Heart: rrr, no m/r/g, wwp, no worsening edema  Lungs: ctab, no w/r/r/c, no lb  Abdomen: s/nt/nd, +BS    Results:  Lab Results   Component Value Date     (L) 06/25/2022    K 5.1 06/25/2022     06/25/2022    CO2 26 06/25/2022    BUN 40 (H) 06/25/2022    CREATININE 5.5 (H) 06/25/2022    CALCIUM 9.1 06/25/2022    ANIONGAP 7 (L) 06/25/2022    ESTGFRAFRICA 9.3 (A) 06/25/2022    EGFRNONAA 8.0 (A) 06/25/2022       Lab Results   Component Value Date    CALCIUM 9.1 06/25/2022    PHOS 5.9 (H) 05/25/2022       Recent Labs   Lab 06/25/22  0440   WBC 9.69   RBC 3.52*   HGB 9.2*   HCT 30.7*      MCV 87   MCH 26.1*   MCHC 30.0*       I have personally reviewed pertinent radiological imaging and reports.    I have spent > 35 minutes providing care for this patient for the above diagnoses. These services have included chart/data/imaging review, evaluation, exam,  formulation of plan, , note preparation, and discussions with staff involved in this patient's care.    Ashleigh Soria MD MPH  [facility] [address] [address]  [phone] (p)  [phone] (f)

## 2022-06-25 NOTE — PROGRESS NOTES
Interim reviewed.   Reviewed vascular intervention note. Anterior tibial artery to the foot- unfortunately does not appear to have a posterior tibial artery to the foot. Prognosis of healing wounds is poor without a patent posterior tibial artery.   Regardless, no amputation is planned for this admission as patient does not wish to have amputation at this time.     Continue floating heels and pillow boots  Continue vac changes      Will continue to monitor     Wilman and glucerna       Recommend LTAC for discharge

## 2022-06-25 NOTE — PROGRESS NOTES
6/21/22  Chart reviewed, cultures and [facility] ID notes, podiatry notes and available photos.   unasyn added to cover acinetobacter  Will see in person 6/22 6/22/22  Consult Note  Infectious Disease    Reason for Consult:  Diabetic foot infection, PAD    HPI: Leanna García is a 56 y.o. female known to me from long hospitalization in May at which time she was treated for bacillus serious bacteremia from a food-borne infection and for MRSA infection of the left medial foot which required several debridements and prolonged IV antibiotics.  MRIs were negative for osteomyelitis and she was transferred to [facility] where she was until 6/14.  One 6/7 she saw Dr. Bruno in the wound clinic and a culture was obtained growing Acinetobacter.  MRI on 06/08 of the forefoot was negative.  Her antibiotics were adjusted by ID at the [facility] and the at the time of her transfer from [facility] to the nursing home she was on daptomycin and oral Cipro.  She was seen in Wound Care Clinic yesterday and felt to have not had adequate improvement in was referred to the hospital for admission.  Amputation was recommended but declined by the patient.  She has a history of poorly controlled diabetes and her A1c which was greater than 14% earlier this year was most recently 8% in May.  She does not currently have a fever or leukocytosis in compared to my last examination of her foot the medial foot wound is smaller and shallower but the left great toe is erythematous and bruised appearing.  She suffered deep tissue injury to the left heel while hospitalized here in May and now has a hard eschar over the left heel.    6/23: interim reviewed. Vascular consult pending, CRP lower.  wanted to clarify that they did not refuse an amputation, and have come to terms if she absolutely needs it, but want to pursue as many options possible to avoid this. The great toe may require amputation however and they understand this.   6/24:  Interim reviewed.   Afebrile.  Status post peripheral angiography and PTA of left popliteal artery.  Patient  are hopeful that this will foot improve.  Discussed much of her vascular disease is small vessel related.  6/25: interim reviewed. No new issues.  updated. Anticipating LTAC. Watching for improvement or progression of foot and great toe lesions.     EXAM & DIAGNOSTICS REVIEWED:   Vitals:     Temp:  [97.9 °F (36.6 °C)-98.9 °F (37.2 °C)]   Temp: 97.9 °F (36.6 °C) (06/25/22 1145)  Pulse: 87 (06/25/22 1145)  Resp: 18 (06/25/22 1145)  BP: (!) 143/83 (06/25/22 1145)  SpO2: 97 % (06/25/22 1145)    Intake/Output Summary (Last 24 hours) at 6/25/2022 1223  Last data filed at 6/24/2022 1735  Gross per 24 hour   Intake 500 ml   Output 3500 ml   Net -3000 ml       General:  In NAD. Alert and attentive, cooperative, comfortable. Has less facial and scleral puffiness  Eyes:  Anicteric,   EOMI. Scleral puffiness is improved  ENT:  No ulcers, exudates, thrush, nares patent,    Neck:  Supple,   Lungs:    Heart:     Abd:     :  Voids   Musc:  Joints without effusion, swelling, erythema, synovitis,  .   Skin:  No rashes.   Neuro:             Alert, attentive, speech fluent, face symmetric, moves all extremities, no focal weakness. Ambulatory  Psych:  Calm, cooperative  Lymphatic:      Extrem: No pitting edema,   Warm. See photos below  VAD:        Isolation:  contact  Wound:   The left  Foot medial wound is smaller and shallower. The left great toe is erythematous, peeling, ?bruised  There is a dense black eschar left heel    6/23: the great toe ulcer is full thickness, part necrotic slough, with cellulitis is a little better.   6/25: the great toe ulcer is unchanged.            General Labs reviewed:  Recent Labs   Lab 06/22/22  0618 06/24/22  0601 06/25/22  0440   WBC 8.67 9.56 9.69   HGB 8.6* 9.8* 9.2*   HCT 28.9* 33.0* 30.7*    358 318       Recent Labs   Lab 06/21/22  1143 06/22/22  0618 06/23/22  0521 06/24/22  0601  06/25/22  0440   *   < > 136 134* 134*   K 4.6   < > 4.7 4.9 5.1   CL 92*   < > 102 98 101   CO2 31*   < > 25 24 26   BUN 44*   < > 34* 65* 40*   CREATININE 5.8*   < > 5.7* 7.6* 5.5*   CALCIUM 10.0   < > 9.2 9.7 9.1   PROT 8.1  --   --   --   --    BILITOT 0.4  --   --   --   --    ALKPHOS 67  --   --   --   --    ALT 11  --   --   --   --    AST 15  --   --   --   --     < > = values in this interval not displayed.     Recent Labs   Lab 06/23/22  0521 06/24/22  0601 06/25/22 0440   CRP 4.68* 5.52* 5.34*         Micro:  Microbiology Results (last 7 days)     Procedure Component Value Units Date/Time    Blood culture #2 **CANNOT BE ORDERED STAT** [[id number]] Collected: 06/21/22 1155    Order Status: Completed Specimen: Blood from Peripheral, Antecubital, Right Updated: 06/24/22 1232     Blood Culture, Routine No Growth to date      No Growth to date      No Growth to date      No Growth to date    Blood culture #1 **CANNOT BE ORDERED STAT** [[id number]] Collected: 06/21/22 1143    Order Status: Completed Specimen: Blood from Peripheral, Wrist, Right Updated: 06/24/22 1232     Blood Culture, Routine No Growth to date      No Growth to date      No Growth to date      No Growth to date    Aerobic culture [[id number]]     Order Status: No result Specimen: Wound           Imaging Reviewed:  5/6/22 arterial US  1. No arterial occlusion or hemodynamically significant stenosis identified.  2. Diffuse bilateral lower extremity peripheral arterial disease.    MRI left forefoot 6/8 negative    Foot xray      Cardiology:    IMPRESSION & PLAN   1. Chronic left foot wounds, s/p abscess with MRSA, and clinically not infected   Left great toe is erythematous, and left heel has eschar due to decubitus   Not making much progress, but would not call it non-salvageable     2. Poorly controlled diabetes, improved  3. PAD, small vessel presumed  4. ESRD, may need more volume removed  5. Recent h/o Bacillus cereus  bacteremia      Recommendations:  Continue daptomycin and unasyn   WBC scan has been ordered by Dr. Bruno       Medical Decision Making during this encounter was  [_] Low Complexity  [_] Moderate Complexity  [ xx ] High Complexity  D/w patient and

## 2022-06-25 NOTE — PROGRESS NOTES
[facility]    Progress Note    Patient Name: Leanna García  MRN: [id number]  Patient Class: IP- Inpatient   Admission Date: 6/21/2022 10:04 AM  Length of Stay: 4  Attending Physician: Conchita Douglas MD  Primary Care Provider: Stefano Lopez MD  Face-to-Face encounter date: 06/25/2022  Code status:  Chief Complaint: Foot Injury (Pt sent by MD for evaluation of her left foot.)        Subjective:    HPI:Leanna García is a 56 y.o.  female who  has a past medical history of A-fib, Arthritis, Bronchitis, Cardiovascular event risk, ASCVD 10-year risk 6.7% (10/15/2016), Diabetes mellitus, Diabetes mellitus type II, Diabetic ulcer of left midfoot (5/5/2022), Disorder of kidney and ureter, Encounter for blood transfusion, ESRD (end stage renal disease) (5/18/2018), MANJINDER (generalized anxiety disorder) (10/25/2016), History of colon polyps (11/02/2016), Hyperlipidemia, Hypertension, and Stroke.. The patient presented to [facility] on 6/21/2022 with a primary complaint of Foot Injury (Pt sent by MD for evaluation of her left foot.)  Patient is currently undergoing treat at [facility] and followed up with podiatrist today.  Podiatry evaluated patient and referred patient to the ED on account of worsening diabetic foot ulcer.     Interval History:   6/22: Patient is doing well. Awaiting vascular consult. No concerns/issues overnight reported by the patient or the nursing staff.    6/23: Case management consulted for placement, still awaiting vascular surgeons eval. ID aaded Unasyn     6/24: Patient had  peripheral angiography and PTA of left popliteal artery today. WBC scan pending    6/25:  Patient states that she feels better today and slept well.  She had dialysis done yesterday.  H&H stable, currently awaiting WBC scan    Review of Systems All other Review of Systems were found to be negative expect for that mentioned already in HPI.     Objective:      Vitals:    06/24/22 2358 06/25/22 0413 06/25/22 0748 06/25/22 0930   BP: (!) 158/70 (!) 152/70 117/60 117/60   BP Location:  Right arm     Patient Position:  Lying     Pulse: 86 84 74    Resp: 18 18 18    Temp: 98.9 °F (37.2 °C) 98.8 °F (37.1 °C) 98.7 °F (37.1 °C)    TempSrc: Oral Oral Oral    SpO2: 96% 97% 95%    Weight:       Height:            Vitals reviewed.  Constitutional: No distress.   HENT: NC  Head: Atraumatic.   Cardiovascular: Normal rate, regular rhythm and normal heart sounds.   Pulmonary/Chest: Effort normal. No wheezes.   Abdominal: Soft. Bowel sounds are normal. No distension and no mass. No tenderness  Neurological: Alert.   Skin: Skin is warm and dry.   Psych: Appropriate mood and affect    Following labs were Reviewed   CBC:  Recent Labs   Lab 06/25/22  0440   WBC 9.69   HGB 9.2*   HCT 30.7*        CMP:  Recent Labs   Lab 06/25/22  0440   CALCIUM 9.1   *   K 5.1   CO2 26      BUN 40*   CREATININE 5.5*       Micro Results  Microbiology Results (last 7 days)     Procedure Component Value Units Date/Time    Blood culture #2 **CANNOT BE ORDERED STAT** [[id number]] Collected: 06/21/22 1155    Order Status: Completed Specimen: Blood from Peripheral, Antecubital, Right Updated: 06/24/22 1232     Blood Culture, Routine No Growth to date      No Growth to date      No Growth to date      No Growth to date    Blood culture #1 **CANNOT BE ORDERED STAT** [[id number]] Collected: 06/21/22 1143    Order Status: Completed Specimen: Blood from Peripheral, Wrist, Right Updated: 06/24/22 1232     Blood Culture, Routine No Growth to date      No Growth to date      No Growth to date      No Growth to date    Aerobic culture [[id number]]     Order Status: No result Specimen: Wound            Radiology Reports  X-Ray Chest 1 View  Result Date: 6/3/2022  No acute process Electronically signed by: Neto Bustillos MD Date:    06/03/2022 Time:    09:25    X-Ray Foot Complete Bilateral  Result Date:  6/21/2022  IMPRESSION: 1.  Bilateral degenerative changes and pes planus as described. 2.  No destructive osseous lesions observed. 3.  Ulcer in the plantar soft tissues of the left foot. 4.  Marked bilateral atherosclerosis. 5.  Amputation an osteotomy of the left fourth digit. Electronically signed by:  Kiel Garvey DO  6/21/2022 11:30 AM CDT Workstation: [id number]    MRI Foot (Forefoot) Left Without Contrast  Result Date: 6/8/2022  Poor quality MRI due to patient motion.  While there remains an ulceration in the plantar medial soft tissues a focal abscess is not seen.  Osteomyelitis is not identified. Electronically signed by: Jose De León MD Date:    06/08/2022 Time:    18:16       Meds  Scheduled Meds:   amLODIPine  10 mg Oral Daily    ampicillin-sulbactim (UNASYN) IVPB  3 g Intravenous Q12H    aspirin  81 mg Oral Daily    atorvastatin  80 mg Oral QHS    azelastine  1 spray Nasal BID    calcium acetate(phosphat bind)  667 mg Oral TID WM    cetirizine  10 mg Oral Every other day    ciprofloxacin HCl  500 mg Oral Daily    clopidogreL  75 mg Oral Daily    collagenase   Topical (Top) Daily    DAPTOmycin (CUBICIN)  IV  500 mg Intravenous Q48H    enoxaparin  30 mg Subcutaneous Daily    epoetin chris-ebpx (RETACRIT) injection  50 Units/kg Intravenous Every Mon, Wed, Fri    fluticasone propionate  2 spray Each Nostril Daily    gabapentin  100 mg Oral BID    hydrALAZINE  50 mg Oral Q12H    insulin detemir U-100  15 Units Subcutaneous QHS    lactulose  20 g Oral BID    losartan  100 mg Oral Daily    mupirocin   Nasal BID    oxybutynin  5 mg Oral TID     Continuous Infusions:  PRN Meds:.acetaminophen, dextrose 50%, dextrose 50%, glucagon (human recombinant), glucose, glucose, hydrALAZINE, ibuprofen, insulin aspart U-100, morphine, ondansetron, ondansetron, sodium chloride 0.9%.    Assessment & Plan:      *Diabetic ulcer of left midfoot  Morphine 2mg q.4h p.r.n. for pain  Continue IV  ABx  Podiatry, Wound Care, ID, Vascular surg on board  Wound care and cultures       Type 2 diabetes mellitus with kidney complication, with long-term current use of insulin  Continue Levemir 15 units nightly  Insulin sliding scale  Regular accuchek       ESRD (end stage renal disease)  Consult nephrology for dialysis       Hypertension associated with diabetes  Resume home medication of hydralazine 50mg BID, losartan 100mg, amlodipine 10mg daily and titrate upwards as needed      PAD  Angiography done 6/24/22  Atorvastatin 40 mg daily  Aspirin 81 mg daily               Discharge Planning:   Is the patient medically ready for discharge?: no    Reason for patient still in hospital (select all that apply): Patient trending condition and Treatment    Above encounter included review of the medical records, interviewing and examining the patient face-to-face, discussion with family and other health care providers, ordering and interpreting lab/test results and formulating a plan of care.     Medical Decision Making:      [_] Low Complexity  [_] Moderate Complexity  [x] High Complexity      Conchita Douglas MD  Department of Hospital Medicine   [facility]

## 2022-06-26 LAB
ANION GAP SERPL CALC-SCNC: 11 MMOL/L (ref 8–16)
BACTERIA BLD CULT: NORMAL
BACTERIA BLD CULT: NORMAL
BUN SERPL-MCNC: 64 MG/DL (ref 6–20)
CALCIUM SERPL-MCNC: 9.5 MG/DL (ref 8.7–10.5)
CHLORIDE SERPL-SCNC: 98 MMOL/L (ref 95–110)
CO2 SERPL-SCNC: 24 MMOL/L (ref 23–29)
CREAT SERPL-MCNC: 7.7 MG/DL (ref 0.5–1.4)
CRP SERPL-MCNC: 5.23 MG/DL
ERYTHROCYTE [DISTWIDTH] IN BLOOD BY AUTOMATED COUNT: 16.7 % (ref 11.5–14.5)
EST. GFR  (AFRICAN AMERICAN): 6.2 ML/MIN/1.73 M^2
EST. GFR  (NON AFRICAN AMERICAN): 5.4 ML/MIN/1.73 M^2
GLUCOSE SERPL-MCNC: 104 MG/DL (ref 70–110)
GLUCOSE SERPL-MCNC: 111 MG/DL (ref 70–110)
GLUCOSE SERPL-MCNC: 134 MG/DL (ref 70–110)
GLUCOSE SERPL-MCNC: 165 MG/DL (ref 70–110)
GLUCOSE SERPL-MCNC: 89 MG/DL (ref 70–110)
HCT VFR BLD AUTO: 30 % (ref 37–48.5)
HGB BLD-MCNC: 8.8 G/DL (ref 12–16)
MCH RBC QN AUTO: 25.8 PG (ref 27–31)
MCHC RBC AUTO-ENTMCNC: 29.3 G/DL (ref 32–36)
MCV RBC AUTO: 88 FL (ref 82–98)
PLATELET # BLD AUTO: 338 K/UL (ref 150–450)
PMV BLD AUTO: 10.2 FL (ref 9.2–12.9)
POTASSIUM SERPL-SCNC: 5.3 MMOL/L (ref 3.5–5.1)
POTASSIUM SERPL-SCNC: 5.5 MMOL/L (ref 3.5–5.1)
RBC # BLD AUTO: 3.41 M/UL (ref 4–5.4)
SODIUM SERPL-SCNC: 133 MMOL/L (ref 136–145)
WBC # BLD AUTO: 9.67 K/UL (ref 3.9–12.7)

## 2022-06-26 PROCEDURE — 25000003 PHARM REV CODE 250: Performed by: STUDENT IN AN ORGANIZED HEALTH CARE EDUCATION/TRAINING PROGRAM

## 2022-06-26 PROCEDURE — 99232 PR SUBSEQUENT HOSPITAL CARE,LEVL II: ICD-10-PCS | Mod: 25,,, | Performed by: PODIATRIST

## 2022-06-26 PROCEDURE — 86140 C-REACTIVE PROTEIN: CPT | Performed by: SURGERY

## 2022-06-26 PROCEDURE — 99232 SBSQ HOSP IP/OBS MODERATE 35: CPT | Mod: 25,,, | Performed by: PODIATRIST

## 2022-06-26 PROCEDURE — 84132 ASSAY OF SERUM POTASSIUM: CPT | Performed by: STUDENT IN AN ORGANIZED HEALTH CARE EDUCATION/TRAINING PROGRAM

## 2022-06-26 PROCEDURE — 12000002 HC ACUTE/MED SURGE SEMI-PRIVATE ROOM

## 2022-06-26 PROCEDURE — 36415 COLL VENOUS BLD VENIPUNCTURE: CPT | Performed by: STUDENT IN AN ORGANIZED HEALTH CARE EDUCATION/TRAINING PROGRAM

## 2022-06-26 PROCEDURE — 82962 GLUCOSE BLOOD TEST: CPT

## 2022-06-26 PROCEDURE — 25000003 PHARM REV CODE 250: Performed by: SURGERY

## 2022-06-26 PROCEDURE — 36415 COLL VENOUS BLD VENIPUNCTURE: CPT | Performed by: SURGERY

## 2022-06-26 PROCEDURE — 63600175 PHARM REV CODE 636 W HCPCS: Performed by: SURGERY

## 2022-06-26 PROCEDURE — 11043 DBRDMT MUSC&/FSCA 1ST 20/<: CPT | Mod: ,,, | Performed by: PODIATRIST

## 2022-06-26 PROCEDURE — 85027 COMPLETE CBC AUTOMATED: CPT | Performed by: SURGERY

## 2022-06-26 PROCEDURE — 80048 BASIC METABOLIC PNL TOTAL CA: CPT | Performed by: STUDENT IN AN ORGANIZED HEALTH CARE EDUCATION/TRAINING PROGRAM

## 2022-06-26 PROCEDURE — 63600175 PHARM REV CODE 636 W HCPCS: Performed by: STUDENT IN AN ORGANIZED HEALTH CARE EDUCATION/TRAINING PROGRAM

## 2022-06-26 PROCEDURE — 11043 DEBRIDEMENT: ICD-10-PCS | Mod: ,,, | Performed by: PODIATRIST

## 2022-06-26 RX ORDER — SODIUM CHLORIDE 9 MG/ML
INJECTION, SOLUTION INTRAVENOUS ONCE
Status: CANCELLED | OUTPATIENT
Start: 2022-06-26 | End: 2022-06-26

## 2022-06-26 RX ADMIN — GABAPENTIN 100 MG: 100 CAPSULE ORAL at 09:06

## 2022-06-26 RX ADMIN — ATORVASTATIN CALCIUM 80 MG: 40 TABLET, FILM COATED ORAL at 08:06

## 2022-06-26 RX ADMIN — MUPIROCIN: 20 OINTMENT TOPICAL at 09:06

## 2022-06-26 RX ADMIN — CIPROFLOXACIN 500 MG: 500 TABLET, FILM COATED ORAL at 09:06

## 2022-06-26 RX ADMIN — HUMAN INSULIN 10 UNITS: 100 INJECTION, SOLUTION SUBCUTANEOUS at 11:06

## 2022-06-26 RX ADMIN — INSULIN DETEMIR 15 UNITS: 100 INJECTION, SOLUTION SUBCUTANEOUS at 08:06

## 2022-06-26 RX ADMIN — IBUPROFEN 400 MG: 400 TABLET ORAL at 08:06

## 2022-06-26 RX ADMIN — FLUTICASONE PROPIONATE 100 MCG: 50 SPRAY, METERED NASAL at 09:06

## 2022-06-26 RX ADMIN — CALCIUM ACETATE 667 MG: 667 CAPSULE ORAL at 04:06

## 2022-06-26 RX ADMIN — ASPIRIN 81 MG: 81 TABLET, COATED ORAL at 09:06

## 2022-06-26 RX ADMIN — CALCIUM ACETATE 667 MG: 667 CAPSULE ORAL at 07:06

## 2022-06-26 RX ADMIN — CLOPIDOGREL BISULFATE 75 MG: 75 TABLET, FILM COATED ORAL at 09:06

## 2022-06-26 RX ADMIN — OXYBUTYNIN CHLORIDE 5 MG: 5 TABLET ORAL at 09:06

## 2022-06-26 RX ADMIN — AZELASTINE HYDROCHLORIDE 137 MCG: 137 SPRAY, METERED NASAL at 08:06

## 2022-06-26 RX ADMIN — OXYBUTYNIN CHLORIDE 5 MG: 5 TABLET ORAL at 03:06

## 2022-06-26 RX ADMIN — AZELASTINE HYDROCHLORIDE 137 MCG: 137 SPRAY, METERED NASAL at 09:06

## 2022-06-26 RX ADMIN — MUPIROCIN: 20 OINTMENT TOPICAL at 08:06

## 2022-06-26 RX ADMIN — COLLAGENASE SANTYL: 250 OINTMENT TOPICAL at 09:06

## 2022-06-26 RX ADMIN — GABAPENTIN 100 MG: 100 CAPSULE ORAL at 08:06

## 2022-06-26 RX ADMIN — OXYBUTYNIN CHLORIDE 5 MG: 5 TABLET ORAL at 08:06

## 2022-06-26 RX ADMIN — ENOXAPARIN SODIUM 30 MG: 30 INJECTION SUBCUTANEOUS at 04:06

## 2022-06-26 RX ADMIN — LACTULOSE 20 G: 20 SOLUTION ORAL at 09:06

## 2022-06-26 RX ADMIN — AMPICILLIN SODIUM AND SULBACTAM SODIUM 3 G: 2; 1 INJECTION, POWDER, FOR SOLUTION INTRAMUSCULAR; INTRAVENOUS at 09:06

## 2022-06-26 RX ADMIN — LACTULOSE 20 G: 20 SOLUTION ORAL at 08:06

## 2022-06-26 RX ADMIN — HYDRALAZINE HYDROCHLORIDE 50 MG: 25 TABLET ORAL at 08:06

## 2022-06-26 RX ADMIN — AMPICILLIN SODIUM AND SULBACTAM SODIUM 3 G: 2; 1 INJECTION, POWDER, FOR SOLUTION INTRAMUSCULAR; INTRAVENOUS at 10:06

## 2022-06-26 RX ADMIN — CALCIUM ACETATE 667 MG: 667 CAPSULE ORAL at 12:06

## 2022-06-26 RX ADMIN — DEXTROSE MONOHYDRATE 25 G: 25 INJECTION, SOLUTION INTRAVENOUS at 12:06

## 2022-06-26 NOTE — PROCEDURES
"Leanna García is a 56 y.o. female patient.    Temp: 98.1 °F (36.7 °C) (06/26/22 1112)  Pulse: 74 (06/26/22 1112)  Resp: 18 (06/26/22 1112)  BP: (!) 151/76 (06/26/22 1112)  SpO2: (!) 92 % (06/26/22 1112)  Weight: 89.1 kg (196 lb 6.9 oz) (06/21/22 2300)  Height: 5' 8" (172.7 cm) (06/21/22 2300)  Mallampati Scale: Class II  ASA Classification: Class 2    Debridement    Date/Time: 6/26/2022 11:41 AM  Performed by: Alivia Bruno DPM  Authorized by: Alivia Bruno DPM     Time out: Immediately prior to procedure a "time out" was called to verify the correct patient, procedure, equipment, support staff and site/side marked as required.    Consent Done?:  Yes (Verbal)    Preparation: Patient was prepped and draped in usual sterile fashion    Local anesthesia used?: No      Wound Details:    Location:  Left foot    Location:  Left Midfoot    Type of Debridement:  Excisional       Length (cm):  1       Area (sq cm):  0.8       Width (cm):  0.8       Percent Debrided (%):  100       Depth (cm):  0.4       Total Area Debrided (sq cm):  0.8    Depth of debridement:  Muscle/fascia/tendon    Tissue debrided:  Fascia, Subcutaneous and Ligament    Devitalized tissue debrided:  Biofilm, Slough, Necrotic/Eschar, Fibrin and Other    Instruments:  Forceps, Blade and Curette    Bleeding:  Moderate  Hemostasis Achieved: Yes    Method Used:  Pressure  Patient tolerance:  Patient tolerated the procedure well with no immediate complications        6/26/2022  "

## 2022-06-26 NOTE — PROGRESS NOTES
[facility]  Podiatry  Progress Note    Patient Name: Leanna García  MRN: [id number]  Admission Date: 6/21/2022  Hospital Length of Stay: 5 days  Attending Physician: Conchita Douglas MD  Primary Care Provider: Stefano Lopez MD     Subjective:     Interval History:  Patient resting bedside.   is also bedside.  Discussed with patient vascular findings.  Discussed that she does not have a posterior tibial artery.  Discussed that vascular surgery was able to open up her anterior tibial artery.  At this time I do not recommend amputation, but rather waiting to see whether the anterior tibial artery being opened is enough flow to heal her wounds.  Patient understands that there is always a possibility of amputation in the future, but we will try aggressive wound care and infection control for now.      Follow-up For: Procedure(s) (LRB):  Angiogram, Abdominal Aorta W/ Extremity Runoff (N/A)  PTA, Superficial Femoral Artery    Post-Operative Day: 2 Days Post-Op    Scheduled Meds:   amLODIPine  10 mg Oral Daily    ampicillin-sulbactim (UNASYN) IVPB  3 g Intravenous Q12H    aspirin  81 mg Oral Daily    atorvastatin  80 mg Oral QHS    azelastine  1 spray Nasal BID    calcium acetate(phosphat bind)  667 mg Oral TID WM    cetirizine  10 mg Oral Every other day    ciprofloxacin HCl  500 mg Oral Daily    clopidogreL  75 mg Oral Daily    collagenase   Topical (Top) Daily    DAPTOmycin (CUBICIN)  IV  500 mg Intravenous Q48H    dextrose 50%  25 g Intravenous Once    enoxaparin  30 mg Subcutaneous Daily    epoetin chris-ebpx (RETACRIT) injection  50 Units/kg Intravenous Every Mon, Wed, Fri    fluticasone propionate  2 spray Each Nostril Daily    gabapentin  100 mg Oral BID    hydrALAZINE  50 mg Oral Q12H    insulin detemir U-100  15 Units Subcutaneous QHS    insulin regular  10 Units Intravenous Once    lactulose  20 g Oral BID    losartan  100 mg Oral Daily    mupirocin   Nasal BID     oxybutynin  5 mg Oral TID     Continuous Infusions:  PRN Meds:acetaminophen, dextrose 50%, dextrose 50%, glucagon (human recombinant), glucose, glucose, hydrALAZINE, ibuprofen, insulin aspart U-100, morphine, ondansetron, ondansetron, sodium chloride 0.9%    Review of Systems  Objective:     Vital Signs (Most Recent):  Temp: 98.1 °F (36.7 °C) (06/26/22 1112)  Pulse: 74 (06/26/22 1112)  Resp: 18 (06/26/22 1112)  BP: (!) 151/76 (06/26/22 1112)  SpO2: (!) 92 % (06/26/22 1112)   Vital Signs (24h Range):  Temp:  [97.9 °F (36.6 °C)-98.9 °F (37.2 °C)] 98.1 °F (36.7 °C)  Pulse:  [63-94] 74  Resp:  [16-18] 18  SpO2:  [92 %-97 %] 92 %  BP: (107-170)/(54-84) 151/76     Weight: 89.1 kg (196 lb 6.9 oz)  Body mass index is 29.87 kg/m².    Foot Exam              Laboratory:  All pertinent labs reviewed within the last 24 hours.    Diagnostic Results:  I have reviewed all pertinent imaging results/findings within the past 24 hours.    Assessment/Plan:     * Diabetic ulcer of left midfoot  Patent anterior tibial artery.  Vascular intervention successfully opened this artery.  no patent posterior tibial artery    Bedside debridement of plantar foot wounds, large middle plantar foot wound was not debrided but to wound surrounding the large wound was debrided      Continue would vac on left foot- wound VAC needs to be bridged to all wounds of patient's plantar foot.  There are 3 wounds and the wound VAC only had the foam on the middle wound.  Set at 175 mmHg      Recommend Xeroform followed by bulky wrap with ABD and extra foams to heel wound and great toe wound    Float heels at all times  Pillow boots as well      vicky and glucerna daily- a1c is much improved!      LTAC following discharge for aggressive wound care with wound VAC, and IV antibiotics.        Alivia Bruno DPM  Podiatry  [facility]

## 2022-06-26 NOTE — PROGRESS NOTES
[facility] Medicine    Progress Note    Patient Name: Leanna García  MRN: [id number]  Patient Class: IP- Inpatient   Admission Date: 6/21/2022 10:04 AM  Length of Stay: 5  Attending Physician: Conchita Douglas MD  Primary Care Provider: Stefano Lopez MD  Face-to-Face encounter date: 06/26/2022  Code status:  Chief Complaint: Foot Injury (Pt sent by MD for evaluation of her left foot.)        Subjective:    HPI:Leanna García is a 56 y.o.  female who  has a past medical history of A-fib, Arthritis, Bronchitis, Cardiovascular event risk, ASCVD 10-year risk 6.7% (10/15/2016), Diabetes mellitus, Diabetes mellitus type II, Diabetic ulcer of left midfoot (5/5/2022), Disorder of kidney and ureter, Encounter for blood transfusion, ESRD (end stage renal disease) (5/18/2018), MANJINDER (generalized anxiety disorder) (10/25/2016), History of colon polyps (11/02/2016), Hyperlipidemia, Hypertension, and Stroke.. The patient presented to [facility] on 6/21/2022 with a primary complaint of Foot Injury (Pt sent by MD for evaluation of her left foot.)  Patient is currently undergoing treat at [facility] and followed up with podiatrist today.  Podiatry evaluated patient and referred patient to the ED on account of worsening diabetic foot ulcer.     Interval History:   6/22: Patient is doing well. Awaiting vascular consult. No concerns/issues overnight reported by the patient or the nursing staff.    6/23: Case management consulted for placement, still awaiting vascular surgeons eval. ID aaded [email or web address]     6/24: Patient had  peripheral angiography and PTA of left popliteal artery today. WBC scan pending    6/25:  Patient states that she feels better today and slept well.  She had dialysis done yesterday.  H&H stable, currently awaiting WBC scan    6/26:  Patient is doing well, WBC scan shows findings indicative of active soft tissue inflammatory response changes due to deep  penetrating abscess and less conspicuous for underlying osteomyelitis.  Potassium elevated which is probably secondary to ESRD, would treat with insulin and D50 as patient is not scheduled for dialysis until tomorrow.  No acute overnight event.    Review of Systems All other Review of Systems were found to be negative expect for that mentioned already in HPI.     Objective:     Vitals:    06/25/22 2357 06/26/22 0349 06/26/22 0726 06/26/22 0909   BP: (!) 135/54 (!) 133/55 (!) 107/55 (!) 107/55   BP Location:       Patient Position:       Pulse: 94 63 65    Resp: 16 16 17    Temp: 98.9 °F (37.2 °C) 98 °F (36.7 °C) 98.2 °F (36.8 °C)    TempSrc: Oral Oral Oral    SpO2: 97% 96% 95%    Weight:       Height:            Vitals reviewed.  Constitutional: No distress.   HENT: NC  Head: Atraumatic.   Cardiovascular: Normal rate, regular rhythm and normal heart sounds.   Pulmonary/Chest: Effort normal. No wheezes.   Abdominal: Soft. Bowel sounds are normal. No distension and no mass. No tenderness  Neurological: Alert.   Skin: Skin is warm and dry.   Psych: Appropriate mood and affect    Following labs were Reviewed   CBC:  Recent Labs   Lab 06/26/22  0710   WBC 9.67   HGB 8.8*   HCT 30.0*        CMP:  Recent Labs   Lab 06/26/22  0710   CALCIUM 9.5   *   K 5.3*   CO2 24   CL 98   BUN 64*   CREATININE 7.7*       Micro Results  Microbiology Results (last 7 days)     Procedure Component Value Units Date/Time    Blood culture #2 **CANNOT BE ORDERED STAT** [[id number]] Collected: 06/21/22 1155    Order Status: Completed Specimen: Blood from Peripheral, Antecubital, Right Updated: 06/25/22 1232     Blood Culture, Routine No Growth to date      No Growth to date      No Growth to date      No Growth to date      No Growth to date    Blood culture #1 **CANNOT BE ORDERED STAT** [[id number]] Collected: 06/21/22 1143    Order Status: Completed Specimen: Blood from Peripheral, Wrist, Right Updated: 06/25/22 1232     Blood  Culture, Routine No Growth to date      No Growth to date      No Growth to date      No Growth to date      No Growth to date    Aerobic culture [[id number]]     Order Status: No result Specimen: Wound            Radiology Reports  X-Ray Chest 1 View  Result Date: 6/3/2022  No acute process Electronically signed by: Neto Bustillos MD Date:    06/03/2022 Time:    09:25    X-Ray Foot Complete Bilateral  Result Date: 6/21/2022  IMPRESSION: 1.  Bilateral degenerative changes and pes planus as described. 2.  No destructive osseous lesions observed. 3.  Ulcer in the plantar soft tissues of the left foot. 4.  Marked bilateral atherosclerosis. 5.  Amputation an osteotomy of the left fourth digit. Electronically signed by:  Kiel Garvey DO  6/21/2022 11:30 AM CDT Workstation: [id number]    MRI Foot (Forefoot) Left Without Contrast  Result Date: 6/8/2022  Poor quality MRI due to patient motion.  While there remains an ulceration in the plantar medial soft tissues a focal abscess is not seen.  Osteomyelitis is not identified. Electronically signed by: Jose De León MD Date:    06/08/2022 Time:    18:16       Meds  Scheduled Meds:   amLODIPine  10 mg Oral Daily    ampicillin-sulbactim (UNASYN) IVPB  3 g Intravenous Q12H    aspirin  81 mg Oral Daily    atorvastatin  80 mg Oral QHS    azelastine  1 spray Nasal BID    calcium acetate(phosphat bind)  667 mg Oral TID WM    cetirizine  10 mg Oral Every other day    ciprofloxacin HCl  500 mg Oral Daily    clopidogreL  75 mg Oral Daily    collagenase   Topical (Top) Daily    DAPTOmycin (CUBICIN)  IV  500 mg Intravenous Q48H    enoxaparin  30 mg Subcutaneous Daily    epoetin chris-ebpx (RETACRIT) injection  50 Units/kg Intravenous Every Mon, Wed, Fri    fluticasone propionate  2 spray Each Nostril Daily    gabapentin  100 mg Oral BID    hydrALAZINE  50 mg Oral Q12H    insulin detemir U-100  15 Units Subcutaneous QHS    lactulose  20 g Oral BID    losartan  100  mg Oral Daily    mupirocin   Nasal BID    oxybutynin  5 mg Oral TID     Continuous Infusions:  PRN Meds:.acetaminophen, dextrose 50%, dextrose 50%, glucagon (human recombinant), glucose, glucose, hydrALAZINE, ibuprofen, insulin aspart U-100, morphine, ondansetron, ondansetron, sodium chloride 0.9%.    Assessment & Plan:      *Diabetic ulcer of left midfoot  Morphine 2mg q.4h p.r.n. for pain  Continue IV ABx  Podiatry, Wound Care, ID, Vascular surg on board  Wound care and cultures       Type 2 diabetes mellitus with kidney complication, with long-term current use of insulin  Continue Levemir 15 units nightly  Insulin sliding scale  Regular accuchek       ESRD (end stage renal disease)  Consult nephrology for dialysis       Hypertension associated with diabetes  Resume home medication of hydralazine 50mg BID, losartan 100mg, amlodipine 10mg daily and titrate upwards as needed      PAD  Angiography done 6/24/22  Atorvastatin 40 mg daily  Aspirin 81 mg daily               Discharge Planning:   Is the patient medically ready for discharge?: no    Reason for patient still in hospital (select all that apply): Patient trending condition and Treatment    Above encounter included review of the medical records, interviewing and examining the patient face-to-face, discussion with family and other health care providers, ordering and interpreting lab/test results and formulating a plan of care.     Medical Decision Making:      [_] Low Complexity  [_] Moderate Complexity  [x] High Complexity      Conchita Douglas MD  Department of Hospital Medicine   [facility]

## 2022-06-26 NOTE — SUBJECTIVE & OBJECTIVE
Subjective:     Interval History:  Patient resting bedside.   is also bedside.  Discussed with patient vascular findings.  Discussed that she does not have a posterior tibial artery.  Discussed that vascular surgery was able to open up her anterior tibial artery.  At this time I do not recommend amputation, but rather waiting to see whether the anterior tibial artery being opened is enough flow to heal her wounds.  Patient understands that there is always a possibility of amputation in the future, but we will try aggressive wound care and infection control for now.      Follow-up For: Procedure(s) (LRB):  Angiogram, Abdominal Aorta W/ Extremity Runoff (N/A)  PTA, Superficial Femoral Artery    Post-Operative Day: 2 Days Post-Op    Scheduled Meds:   amLODIPine  10 mg Oral Daily    ampicillin-sulbactim (UNASYN) IVPB  3 g Intravenous Q12H    aspirin  81 mg Oral Daily    atorvastatin  80 mg Oral QHS    azelastine  1 spray Nasal BID    calcium acetate(phosphat bind)  667 mg Oral TID WM    cetirizine  10 mg Oral Every other day    ciprofloxacin HCl  500 mg Oral Daily    clopidogreL  75 mg Oral Daily    collagenase   Topical (Top) Daily    DAPTOmycin (CUBICIN)  IV  500 mg Intravenous Q48H    dextrose 50%  25 g Intravenous Once    enoxaparin  30 mg Subcutaneous Daily    epoetin chris-ebpx (RETACRIT) injection  50 Units/kg Intravenous Every Mon, Wed, Fri    fluticasone propionate  2 spray Each Nostril Daily    gabapentin  100 mg Oral BID    hydrALAZINE  50 mg Oral Q12H    insulin detemir U-100  15 Units Subcutaneous QHS    insulin regular  10 Units Intravenous Once    lactulose  20 g Oral BID    losartan  100 mg Oral Daily    mupirocin   Nasal BID    oxybutynin  5 mg Oral TID     Continuous Infusions:  PRN Meds:acetaminophen, dextrose 50%, dextrose 50%, glucagon (human recombinant), glucose, glucose, hydrALAZINE, ibuprofen, insulin aspart U-100, morphine, ondansetron, ondansetron, sodium chloride 0.9%    Review of  Systems  Objective:     Vital Signs (Most Recent):  Temp: 98.1 °F (36.7 °C) (06/26/22 1112)  Pulse: 74 (06/26/22 1112)  Resp: 18 (06/26/22 1112)  BP: (!) 151/76 (06/26/22 1112)  SpO2: (!) 92 % (06/26/22 1112)   Vital Signs (24h Range):  Temp:  [97.9 °F (36.6 °C)-98.9 °F (37.2 °C)] 98.1 °F (36.7 °C)  Pulse:  [63-94] 74  Resp:  [16-18] 18  SpO2:  [92 %-97 %] 92 %  BP: (107-170)/(54-84) 151/76     Weight: 89.1 kg (196 lb 6.9 oz)  Body mass index is 29.87 kg/m².    Foot Exam              Laboratory:  All pertinent labs reviewed within the last 24 hours.    Diagnostic Results:  I have reviewed all pertinent imaging results/findings within the past 24 hours.

## 2022-06-26 NOTE — PROGRESS NOTES
INPATIENT NEPHROLOGY Progress Note  [facility]    Patient Name: Leanna García  Date: 06/26/2022    Reason for consultation: ESRD    Chief Complaint:   Chief Complaint   Patient presents with    Foot Injury     Pt sent by MD for evaluation of her left foot.       History of Present Illness:  57 y/o F with ESRD on HD MWF, HTN, HLD, and DMII who was sent in by podiatry from [facility] for worsening ulceration and decreased blood flow to L foot with known diabetic wound despite IV antbx, recommending vascular eval and BKA, consulted for dialysis.    Interval History:  6/22- off floor  6/23- no issues with HD yest- got off 2L  6/24- gong for angiogram today with Dr. Haas  6/25- had PTA of the L popliteal artery, no issues with HD yest- got off 3L  6/26- working on placement    Plan of Care:    Assessment:  ESRD on HD MWF  HTN  SHPT  Anemia of CKD  Diabetic foot ulcer; PVD s/p PTA L popliteal artery    Plan:    - Continue HD MWF.   - Continue UF to dry weight.  - Continue renal diet, 1.5L fluid restriction.  - Continue FERMÍN with HD.  - Dose antbx for CrCl < 10/HD. Angiogram reviewed.    Thank you for allowing us to participate in this patient's care. We will continue to follow.    Vital Signs:  Temp Readings from Last 3 Encounters:   06/26/22 98.2 °F (36.8 °C) (Oral)   06/21/22 98.4 °F (36.9 °C)   06/14/22 97.9 °F (36.6 °C)       Pulse Readings from Last 3 Encounters:   06/26/22 65   06/21/22 76   06/14/22 73       BP Readings from Last 3 Encounters:   06/26/22 (!) 107/55   06/21/22 (!) 154/84   06/14/22 138/64       Weight:  Wt Readings from Last 3 Encounters:   06/21/22 89.1 kg (196 lb 6.9 oz)   06/13/22 89.9 kg (198 lb 3.1 oz)   06/02/22 86.2 kg (190 lb)       Medications:  Scheduled Meds:   amLODIPine  10 mg Oral Daily    ampicillin-sulbactim (UNASYN) IVPB  3 g Intravenous Q12H    aspirin  81 mg Oral Daily    atorvastatin  80 mg Oral QHS    azelastine  1 spray Nasal BID    calcium  acetate(phosphat bind)  667 mg Oral TID WM    cetirizine  10 mg Oral Every other day    ciprofloxacin HCl  500 mg Oral Daily    clopidogreL  75 mg Oral Daily    collagenase   Topical (Top) Daily    DAPTOmycin (CUBICIN)  IV  500 mg Intravenous Q48H    enoxaparin  30 mg Subcutaneous Daily    epoetin chris-ebpx (RETACRIT) injection  50 Units/kg Intravenous Every Mon, Wed, Fri    fluticasone propionate  2 spray Each Nostril Daily    gabapentin  100 mg Oral BID    hydrALAZINE  50 mg Oral Q12H    insulin detemir U-100  15 Units Subcutaneous QHS    lactulose  20 g Oral BID    losartan  100 mg Oral Daily    mupirocin   Nasal BID    oxybutynin  5 mg Oral TID     Continuous Infusions:  PRN Meds:.acetaminophen, dextrose 50%, dextrose 50%, glucagon (human recombinant), glucose, glucose, hydrALAZINE, ibuprofen, insulin aspart U-100, morphine, ondansetron, ondansetron, sodium chloride 0.9%  No current facility-administered medications on file prior to encounter.     Current Outpatient Medications on File Prior to Encounter   Medication Sig Dispense Refill    amLODIPine (NORVASC) 10 MG tablet Take 1 tablet (10 mg total) by mouth once daily.      aspirin (ECOTRIN) 81 MG EC tablet Take 81 mg by mouth once daily.      azelastine (ASTELIN) 137 mcg (0.1 %) nasal spray 1 spray by Nasal route 2 (two) times a day.      blood sugar diagnostic Strp To check BG 4 times daily, to use with insurance preferred meter (Patient taking differently: 1 strip by Misc.(Non-Drug; Combo Route) route 4 (four) times daily. To check BG 4 times daily, to use with insurance preferred meter) 450 strip 3    calcium acetate,phosphat bind, (PHOSLO) 667 mg capsule Take 1 capsule (667 mg total) by mouth 3 (three) times daily with meals.      cetirizine (ZYRTEC) 10 MG tablet Take 1 tablet (10 mg total) by mouth every other day.      ciprofloxacin HCl (CIPRO) 500 MG tablet Take 1 tablet (500 mg total) by mouth once daily. On dialysis days give  "after HD. Last dose 6/29/22      fluticasone propionate (FLONASE) 50 mcg/actuation nasal spray 2 sprays (100 mcg total) by Each Nostril route once daily.      gabapentin (NEURONTIN) 100 MG capsule Take 1 capsule (100 mg total) by mouth 2 (two) times daily.      hydrALAZINE (APRESOLINE) 50 MG tablet Take 1 tablet (50 mg total) by mouth every 12 (twelve) hours.      HYDROcodone-acetaminophen (NORCO) 5-325 mg per tablet Take 1 tablet by mouth every 6 (six) hours as needed. 15 tablet 0    insulin aspart U-100 (NOVOLOG FLEXPEN U-100 INSULIN) 100 unit/mL (3 mL) InPn pen Inject 5-8 units 3 times per day with food. 1 Box 6    insulin detemir U-100 (LEVEMIR FLEXTOUCH U-100 INSULN) 100 unit/mL (3 mL) InPn pen Inject 15 Units into the skin every evening.      lactulose (CHRONULAC) 10 gram/15 mL solution Take 20 g by mouth 2 (two) times a day.      lancets Misc To use to check blood sugar 4 times daily. To use with insurance approved meter. Patient needs the round, purple one (Patient taking differently: 1 lancet by Misc.(Non-Drug; Combo Route) route 4 (four) times daily. To use to check blood sugar 4 times daily. To use with insurance approved meter. Patient needs the round, purple one) 450 each 3    losartan (COZAAR) 100 MG tablet Take 1 tablet (100 mg total) by mouth once daily.      multivitamin (THERAGRAN) per tablet Take 1 tablet by mouth once daily.      oxybutynin (DITROPAN) 5 MG Tab Take 1 tablet (5 mg total) by mouth 3 (three) times daily.      pen needle, diabetic (BD ULTRA-FINE ROBERT PEN NEEDLE) 32 gauge x 5/32" Ndle To use 4 times per day with insulin injections. (Patient taking differently: 1 pen by Misc.(Non-Drug; Combo Route) route 4 (four) times daily. To use 4 times per day with insulin injections.) 450 each 2    polyethylene glycol (GLYCOLAX) 17 gram PwPk Take 17 g by mouth 2 (two) times daily as needed.      sodium chloride 0.9% SolP 50 mL with DAPTOmycin 500 mg SolR 500 mg Inject 500 mg into " the vein every 48 hours. Until 6/29/22      [DISCONTINUED] atorvastatin (LIPITOR) 80 MG tablet Take 1 tablet (80 mg total) by mouth once daily. (Patient taking differently: Take 80 mg by mouth every evening.) 90 tablet 3    [DISCONTINUED] blood-glucose meter (ACCU-CHEK KAYLIE PLUS METER) Misc To use to check blood sugars 4 times a day 1 each 0    [DISCONTINUED] clorazepate (TRANXENE) 3.75 MG Tab Take 7.5 mg by mouth nightly as needed.       [DISCONTINUED] dextrose (GLUCOSE GEL) 40 % gel Take 37.5 mLs (15,000 mg total) by mouth once as needed (hypoglycemia). 37.5 g 4    [DISCONTINUED] ezetimibe (ZETIA) 10 mg tablet Take 1 tablet (10 mg total) by mouth once daily.      [DISCONTINUED] fenofibrate 160 MG Tab Take 1 tablet (160 mg total) by mouth once daily. 90 tablet 3    [DISCONTINUED] lancing device with lancets (ACCU-CHEK SOFT DEV LANCETS) Kit To check blood sugars 4 times per day 400 each 3    [DISCONTINUED] pantoprazole (PROTONIX) 40 MG tablet Take 1 tablet (40 mg total) by mouth once daily.         Review of Systems:  Neg    Physical Exam:  Constitutional: nad, aao x 3  Heart: rrr, no m/r/g, wwp, no worsening edema, with wound vac  Lungs: ctab, no w/r/r/c, no lb  Abdomen: s/nt/nd, +BS    Results:  Lab Results   Component Value Date     (L) 06/26/2022    K 5.3 (H) 06/26/2022    CL 98 06/26/2022    CO2 24 06/26/2022    BUN 64 (H) 06/26/2022    CREATININE 7.7 (H) 06/26/2022    CALCIUM 9.5 06/26/2022    ANIONGAP 11 06/26/2022    ESTGFRAFRICA 6.2 (A) 06/26/2022    EGFRNONAA 5.4 (A) 06/26/2022       Lab Results   Component Value Date    CALCIUM 9.5 06/26/2022    PHOS 5.9 (H) 05/25/2022       Recent Labs   Lab 06/26/22  0710   WBC 9.67   RBC 3.41*   HGB 8.8*   HCT 30.0*      MCV 88   MCH 25.8*   MCHC 29.3*       I have personally reviewed pertinent radiological imaging and reports.    I have spent > 35 minutes providing care for this patient for the above diagnoses. These services have included  chart/data/imaging review, evaluation, exam, formulation of plan, , note preparation, and discussions with staff involved in this patient's care.    Ashleigh Soria MD MPH  [facility] [address] [address]  [phone] (p)  [phone] (f)

## 2022-06-27 ENCOUNTER — PATIENT OUTREACH (OUTPATIENT)
Dept: ADMINISTRATIVE | Facility: HOSPITAL | Age: 57
End: 2022-06-27
Payer: MEDICAID

## 2022-06-27 ENCOUNTER — PATIENT MESSAGE (OUTPATIENT)
Dept: ADMINISTRATIVE | Facility: HOSPITAL | Age: 57
End: 2022-06-27
Payer: MEDICAID

## 2022-06-27 PROBLEM — I73.9 PAD (PERIPHERAL ARTERY DISEASE): Status: ACTIVE | Noted: 2022-06-27

## 2022-06-27 LAB
ANION GAP SERPL CALC-SCNC: 14 MMOL/L (ref 8–16)
BUN SERPL-MCNC: 82 MG/DL (ref 6–20)
CALCIUM SERPL-MCNC: 9.6 MG/DL (ref 8.7–10.5)
CHLORIDE SERPL-SCNC: 96 MMOL/L (ref 95–110)
CO2 SERPL-SCNC: 21 MMOL/L (ref 23–29)
CREAT SERPL-MCNC: 9.3 MG/DL (ref 0.5–1.4)
ERYTHROCYTE [DISTWIDTH] IN BLOOD BY AUTOMATED COUNT: 17 % (ref 11.5–14.5)
EST. GFR  (AFRICAN AMERICAN): 4.9 ML/MIN/1.73 M^2
EST. GFR  (NON AFRICAN AMERICAN): 4.3 ML/MIN/1.73 M^2
GLUCOSE SERPL-MCNC: 114 MG/DL (ref 70–110)
GLUCOSE SERPL-MCNC: 118 MG/DL (ref 70–110)
GLUCOSE SERPL-MCNC: 126 MG/DL (ref 70–110)
GLUCOSE SERPL-MCNC: 133 MG/DL (ref 70–110)
GLUCOSE SERPL-MCNC: 183 MG/DL (ref 70–110)
GLUCOSE SERPL-MCNC: 97 MG/DL (ref 70–110)
HCT VFR BLD AUTO: 30.5 % (ref 37–48.5)
HGB BLD-MCNC: 8.6 G/DL (ref 12–16)
MAGNESIUM SERPL-MCNC: 3 MG/DL (ref 1.6–2.6)
MCH RBC QN AUTO: 25.7 PG (ref 27–31)
MCHC RBC AUTO-ENTMCNC: 28.2 G/DL (ref 32–36)
MCV RBC AUTO: 91 FL (ref 82–98)
PLATELET # BLD AUTO: 326 K/UL (ref 150–450)
PMV BLD AUTO: 10.1 FL (ref 9.2–12.9)
POTASSIUM SERPL-SCNC: 5.9 MMOL/L (ref 3.5–5.1)
RBC # BLD AUTO: 3.35 M/UL (ref 4–5.4)
SODIUM SERPL-SCNC: 131 MMOL/L (ref 136–145)
WBC # BLD AUTO: 9.9 K/UL (ref 3.9–12.7)

## 2022-06-27 PROCEDURE — 97605 NEG PRS WND THER DME<=50SQCM: CPT

## 2022-06-27 PROCEDURE — 63600175 PHARM REV CODE 636 W HCPCS: Performed by: SURGERY

## 2022-06-27 PROCEDURE — 85027 COMPLETE CBC AUTOMATED: CPT | Performed by: STUDENT IN AN ORGANIZED HEALTH CARE EDUCATION/TRAINING PROGRAM

## 2022-06-27 PROCEDURE — 63600175 PHARM REV CODE 636 W HCPCS: Mod: JG | Performed by: SURGERY

## 2022-06-27 PROCEDURE — 36415 COLL VENOUS BLD VENIPUNCTURE: CPT | Performed by: STUDENT IN AN ORGANIZED HEALTH CARE EDUCATION/TRAINING PROGRAM

## 2022-06-27 PROCEDURE — 25000003 PHARM REV CODE 250: Performed by: SURGERY

## 2022-06-27 PROCEDURE — 99231 PR SUBSEQUENT HOSPITAL CARE,LEVL I: ICD-10-PCS | Mod: ,,, | Performed by: INTERNAL MEDICINE

## 2022-06-27 PROCEDURE — 80048 BASIC METABOLIC PNL TOTAL CA: CPT | Performed by: STUDENT IN AN ORGANIZED HEALTH CARE EDUCATION/TRAINING PROGRAM

## 2022-06-27 PROCEDURE — 63600175 PHARM REV CODE 636 W HCPCS: Performed by: STUDENT IN AN ORGANIZED HEALTH CARE EDUCATION/TRAINING PROGRAM

## 2022-06-27 PROCEDURE — 99231 SBSQ HOSP IP/OBS SF/LOW 25: CPT | Mod: ,,, | Performed by: INTERNAL MEDICINE

## 2022-06-27 PROCEDURE — 90935 HEMODIALYSIS ONE EVALUATION: CPT

## 2022-06-27 PROCEDURE — 12000002 HC ACUTE/MED SURGE SEMI-PRIVATE ROOM

## 2022-06-27 PROCEDURE — 83735 ASSAY OF MAGNESIUM: CPT | Performed by: STUDENT IN AN ORGANIZED HEALTH CARE EDUCATION/TRAINING PROGRAM

## 2022-06-27 PROCEDURE — 25000003 PHARM REV CODE 250: Performed by: STUDENT IN AN ORGANIZED HEALTH CARE EDUCATION/TRAINING PROGRAM

## 2022-06-27 RX ADMIN — ATORVASTATIN CALCIUM 80 MG: 40 TABLET, FILM COATED ORAL at 09:06

## 2022-06-27 RX ADMIN — CIPROFLOXACIN 500 MG: 500 TABLET, FILM COATED ORAL at 08:06

## 2022-06-27 RX ADMIN — LOSARTAN POTASSIUM 100 MG: 50 TABLET, FILM COATED ORAL at 08:06

## 2022-06-27 RX ADMIN — CALCIUM ACETATE 667 MG: 667 CAPSULE ORAL at 05:06

## 2022-06-27 RX ADMIN — CALCIUM ACETATE 667 MG: 667 CAPSULE ORAL at 08:06

## 2022-06-27 RX ADMIN — IBUPROFEN 400 MG: 400 TABLET ORAL at 07:06

## 2022-06-27 RX ADMIN — COLLAGENASE SANTYL: 250 OINTMENT TOPICAL at 08:06

## 2022-06-27 RX ADMIN — OXYBUTYNIN CHLORIDE 5 MG: 5 TABLET ORAL at 09:06

## 2022-06-27 RX ADMIN — GABAPENTIN 100 MG: 100 CAPSULE ORAL at 09:06

## 2022-06-27 RX ADMIN — ENOXAPARIN SODIUM 30 MG: 30 INJECTION SUBCUTANEOUS at 05:06

## 2022-06-27 RX ADMIN — CALCIUM ACETATE 667 MG: 667 CAPSULE ORAL at 02:06

## 2022-06-27 RX ADMIN — MORPHINE SULFATE 2 MG: 2 INJECTION, SOLUTION INTRAMUSCULAR; INTRAVENOUS at 11:06

## 2022-06-27 RX ADMIN — AZELASTINE HYDROCHLORIDE 137 MCG: 137 SPRAY, METERED NASAL at 09:06

## 2022-06-27 RX ADMIN — AMLODIPINE BESYLATE 10 MG: 5 TABLET ORAL at 08:06

## 2022-06-27 RX ADMIN — ASPIRIN 81 MG: 81 TABLET, COATED ORAL at 08:06

## 2022-06-27 RX ADMIN — ACETAMINOPHEN 650 MG: 325 TABLET ORAL at 09:06

## 2022-06-27 RX ADMIN — FLUTICASONE PROPIONATE 100 MCG: 50 SPRAY, METERED NASAL at 09:06

## 2022-06-27 RX ADMIN — CLOPIDOGREL BISULFATE 75 MG: 75 TABLET, FILM COATED ORAL at 08:06

## 2022-06-27 RX ADMIN — INSULIN DETEMIR 15 UNITS: 100 INJECTION, SOLUTION SUBCUTANEOUS at 09:06

## 2022-06-27 RX ADMIN — HYDRALAZINE HYDROCHLORIDE 50 MG: 25 TABLET ORAL at 08:06

## 2022-06-27 RX ADMIN — OXYBUTYNIN CHLORIDE 5 MG: 5 TABLET ORAL at 02:06

## 2022-06-27 RX ADMIN — EPOETIN ALFA-EPBX 4500 UNITS: 10000 INJECTION, SOLUTION INTRAVENOUS; SUBCUTANEOUS at 12:06

## 2022-06-27 RX ADMIN — OXYBUTYNIN CHLORIDE 5 MG: 5 TABLET ORAL at 08:06

## 2022-06-27 RX ADMIN — AMPICILLIN SODIUM AND SULBACTAM SODIUM 3 G: 2; 1 INJECTION, POWDER, FOR SOLUTION INTRAMUSCULAR; INTRAVENOUS at 02:06

## 2022-06-27 RX ADMIN — CETIRIZINE HYDROCHLORIDE 10 MG: 10 TABLET, FILM COATED ORAL at 08:06

## 2022-06-27 RX ADMIN — INSULIN ASPART 1 UNITS: 100 INJECTION, SOLUTION INTRAVENOUS; SUBCUTANEOUS at 09:06

## 2022-06-27 RX ADMIN — HYDRALAZINE HYDROCHLORIDE 50 MG: 25 TABLET ORAL at 09:06

## 2022-06-27 RX ADMIN — LACTULOSE 20 G: 20 SOLUTION ORAL at 09:06

## 2022-06-27 RX ADMIN — DAPTOMYCIN 500 MG: 500 INJECTION, POWDER, LYOPHILIZED, FOR SOLUTION INTRAVENOUS at 09:06

## 2022-06-27 RX ADMIN — GABAPENTIN 100 MG: 100 CAPSULE ORAL at 08:06

## 2022-06-27 NOTE — PROGRESS NOTES
06/27/22 1345   Post-Hemodialysis Assessment   Rinseback Volume (mL) 250 mL   Blood Volume Processed (Liters) 68 L   Dialyzer Clearance Lightly streaked   Duration of Treatment 180 minutes   Additional Fluid Intake (mL) 500 mL   Total UF (mL) 3000 mL   Net Fluid Removal 2500   Patient Response to Treatment tolerated well   Post-Treatment Weight 91 kg (200 lb 9.9 oz)   Treatment Weight Change -2.3   Arterial bleeding stop time (min) 6 min   Venous bleeding stop time (min) 6 min   Post-Hemodialysis Comments no problems

## 2022-06-27 NOTE — PROGRESS NOTES
6/21/22  Chart reviewed, cultures and [facility] ID notes, podiatry notes and available photos.   unasyn added to cover acinetobacter  Will see in person 6/22 6/22/22  Consult Note  Infectious Disease    Reason for Consult:  Diabetic foot infection, PAD    HPI: Leanna García is a 56 y.o. female known to me from long hospitalization in May at which time she was treated for bacillus serious bacteremia from a food-borne infection and for MRSA infection of the left medial foot which required several debridements and prolonged IV antibiotics.  MRIs were negative for osteomyelitis and she was transferred to [facility] where she was until 6/14.  One 6/7 she saw Dr. Bruno in the wound clinic and a culture was obtained growing Acinetobacter.  MRI on 06/08 of the forefoot was negative.  Her antibiotics were adjusted by ID at the [facility] and the at the time of her transfer from [facility] to the nursing home she was on daptomycin and oral Cipro.  She was seen in Wound Care Clinic yesterday and felt to have not had adequate improvement in was referred to the hospital for admission.  Amputation was recommended but declined by the patient.  She has a history of poorly controlled diabetes and her A1c which was greater than 14% earlier this year was most recently 8% in May.  She does not currently have a fever or leukocytosis in compared to my last examination of her foot the medial foot wound is smaller and shallower but the left great toe is erythematous and bruised appearing.  She suffered deep tissue injury to the left heel while hospitalized here in May and now has a hard eschar over the left heel.    6/23: interim reviewed. Vascular consult pending, CRP lower.  wanted to clarify that they did not refuse an amputation, and have come to terms if she absolutely needs it, but want to pursue as many options possible to avoid this. The great toe may require amputation however and they understand this.   6/24:  Interim reviewed.   Afebrile.  Status post peripheral angiography and PTA of left popliteal artery.  Patient  are hopeful that this will foot improve.  Discussed much of her vascular disease is small vessel related.  6/25: interim reviewed. No new issues.  updated. Anticipating LTAC. Watching for improvement or progression of foot and great toe lesions.   6/27: interim reviewed. D/w Dr. Bruno yesterday who is more hopeful for left foot since PTA. Right heel eschar seems smaller. a1c down to 7.5%.  She complains of left heel pain and discomfort from friction but she really isn't moving around and not even getting up in the chair.  Appetite is good, no diarrhea    EXAM & DIAGNOSTICS REVIEWED:   Vitals:     Temp:  [98.2 °F (36.8 °C)-98.8 °F (37.1 °C)]   Temp: 98.5 °F (36.9 °C) (06/27/22 1345)  Pulse: 62 (06/27/22 1345)  Resp: 18 (06/27/22 1345)  BP: 133/66 (06/27/22 1345)  SpO2: 95 % (06/27/22 1030)    Intake/Output Summary (Last 24 hours) at 6/27/2022 1702  Last data filed at 6/27/2022 1345  Gross per 24 hour   Intake 1100 ml   Output 3000 ml   Net -1900 ml       General:  In NAD. Alert and attentive, cooperative, comfortable. Has less facial and scleral puffiness  Eyes:  Anicteric,   EOMI. Scleral puffiness is improved  ENT:  No ulcers, exudates, thrush, nares patent,    Neck:  Supple,   Lungs:    Heart:     Abd:     :  Voids   Musc:  Joints without effusion, swelling, erythema, synovitis,  .   Skin:  No rashes.   Neuro:             Alert, attentive, speech fluent, face symmetric, moves all extremities, no focal weakness. Ambulatory  Psych:  Calm, cooperative  Lymphatic:      Extrem: No pitting edema,   Warm. See photos below  VAD:        Isolation:  contact  Wound:   The left  Foot medial wound is smaller and shallower. The left great toe is erythematous, peeling, ?bruised  There is a dense black eschar left heel    6/23: the great toe ulcer is full thickness, part necrotic slough, with cellulitis is a little better.    6/25: the great toe ulcer is unchanged.   6/26photos                       General Labs reviewed:  Recent Labs   Lab 06/25/22  0440 06/26/22  0710 06/27/22  0625   WBC 9.69 9.67 9.90   HGB 9.2* 8.8* 8.6*   HCT 30.7* 30.0* 30.5*    338 326       Recent Labs   Lab 06/21/22  1143 06/22/22  0618 06/25/22  0440 06/26/22  0710 06/26/22  1459 06/27/22  0625   *   < > 134* 133*  --  131*   K 4.6   < > 5.1 5.3* 5.5* 5.9*   CL 92*   < > 101 98  --  96   CO2 31*   < > 26 24  --  21*   BUN 44*   < > 40* 64*  --  82*   CREATININE 5.8*   < > 5.5* 7.7*  --  9.3*   CALCIUM 10.0   < > 9.1 9.5  --  9.6   PROT 8.1  --   --   --   --   --    BILITOT 0.4  --   --   --   --   --    ALKPHOS 67  --   --   --   --   --    ALT 11  --   --   --   --   --    AST 15  --   --   --   --   --     < > = values in this interval not displayed.     Recent Labs   Lab 06/24/22  0601 06/25/22  0440 06/26/22  0710   CRP 5.52* 5.34* 5.23*         Micro:  Microbiology Results (last 7 days)     Procedure Component Value Units Date/Time    Blood culture #2 **CANNOT BE ORDERED STAT** [[id number]] Collected: 06/21/22 1155    Order Status: Completed Specimen: Blood from Peripheral, Antecubital, Right Updated: 06/26/22 1232     Blood Culture, Routine No growth after 5 days.    Blood culture #1 **CANNOT BE ORDERED STAT** [[id number]] Collected: 06/21/22 1143    Order Status: Completed Specimen: Blood from Peripheral, Wrist, Right Updated: 06/26/22 1232     Blood Culture, Routine No growth after 5 days.    Aerobic culture [[id number]]     Order Status: No result Specimen: Wound           Imaging Reviewed:  5/6/22 arterial US  1. No arterial occlusion or hemodynamically significant stenosis identified.  2. Diffuse bilateral lower extremity peripheral arterial disease.    MRI left forefoot 6/8 negative    Foot xray      Cardiology:    IMPRESSION & PLAN   1. Chronic left foot wounds, s/p abscess with MRSA, and clinically not infected   Left great toe is  erythematous, and left heel has eschar due to decubitus   Improved left great toe post PTA, per Dr. Bruno    2. Poorly controlled diabetes, improved  3. PAD, small vessel presumed  4. ESRD, may need more volume removed  5. Recent h/o Bacillus cereus bacteremia      Recommendations:  Continue daptomycin and unasyn  Awaiting placement at LTAC  Can stop cipro   Will need a midline again    Medical Decision Making during this encounter was  [_] Low Complexity  [_] Moderate Complexity  [ xx ] High Complexity  D/w patient and

## 2022-06-27 NOTE — PROGRESS NOTES
INPATIENT NEPHROLOGY Progress Note  [facility]    Patient Name: Leanna García  Date: 06/27/2022    Reason for consultation: ESRD    Chief Complaint:   Chief Complaint   Patient presents with    Foot Injury     Pt sent by MD for evaluation of her left foot.     History of Present Illness:  55 y/o F with ESRD on HD MWF, HTN, HLD, and DMII who was sent in by podiatry from [facility] for worsening ulceration and decreased blood flow to L foot with known diabetic wound despite IV antbx, recommending vascular eval and BKA, consulted for dialysis.    Interval History:  6/22- off floor  6/23- no issues with HD yest- got off 2L  6/24- gong for angiogram today with Dr. Haas  6/25- had PTA of the L popliteal artery, no issues with HD yest- got off 3L  6/26- working on placement  6/27 VSS, no new complains. Hd today. s/p debridement yesterday.    Plan of Care:    ESRD on HD MWF  Hyperkalemia  Hyponatremia  HTN  Secondary HPT  Anemia of CKD  Diabetic foot ulcer; PVD s/p PTA L popliteal artery    Plan:    - Continue HD MWF.   - Continue UF to dry weight.  - Continue renal diet, 1.5L fluid restriction.  - Continue FERMÍN with HD.  - Dose antbx for CrCl < 10/HD. Angiogram reviewed.  - if K remains elevated, will start Lokelma.    Thank you for allowing us to participate in this patient's care. We will continue to follow.    Vital Signs:  Temp Readings from Last 3 Encounters:   06/27/22 98.7 °F (37.1 °C) (Oral)   06/21/22 98.4 °F (36.9 °C)   06/14/22 97.9 °F (36.6 °C)       Pulse Readings from Last 3 Encounters:   06/27/22 62   06/21/22 76   06/14/22 73       BP Readings from Last 3 Encounters:   06/27/22 (!) 163/72   06/21/22 (!) 154/84   06/14/22 138/64       Weight:  Wt Readings from Last 3 Encounters:   06/21/22 89.1 kg (196 lb 6.9 oz)   06/13/22 89.9 kg (198 lb 3.1 oz)   06/02/22 86.2 kg (190 lb)       Medications:  Scheduled Meds:   amLODIPine  10 mg Oral Daily    ampicillin-sulbactim (UNASYN) IVPB   3 g Intravenous Q12H    aspirin  81 mg Oral Daily    atorvastatin  80 mg Oral QHS    azelastine  1 spray Nasal BID    calcium acetate(phosphat bind)  667 mg Oral TID WM    cetirizine  10 mg Oral Every other day    ciprofloxacin HCl  500 mg Oral Daily    clopidogreL  75 mg Oral Daily    collagenase   Topical (Top) Daily    DAPTOmycin (CUBICIN)  IV  500 mg Intravenous Q48H    enoxaparin  30 mg Subcutaneous Daily    epoetin chris-ebpx (RETACRIT) injection  50 Units/kg Intravenous Every Mon, Wed, Fri    fluticasone propionate  2 spray Each Nostril Daily    gabapentin  100 mg Oral BID    hydrALAZINE  50 mg Oral Q12H    insulin detemir U-100  15 Units Subcutaneous QHS    lactulose  20 g Oral BID    losartan  100 mg Oral Daily    oxybutynin  5 mg Oral TID     Continuous Infusions:  PRN Meds:.acetaminophen, dextrose 50%, dextrose 50%, glucagon (human recombinant), glucose, glucose, hydrALAZINE, ibuprofen, insulin aspart U-100, morphine, ondansetron, ondansetron, sodium chloride 0.9%  No current facility-administered medications on file prior to encounter.     Current Outpatient Medications on File Prior to Encounter   Medication Sig Dispense Refill    amLODIPine (NORVASC) 10 MG tablet Take 1 tablet (10 mg total) by mouth once daily.      aspirin (ECOTRIN) 81 MG EC tablet Take 81 mg by mouth once daily.      azelastine (ASTELIN) 137 mcg (0.1 %) nasal spray 1 spray by Nasal route 2 (two) times a day.      blood sugar diagnostic Strp To check BG 4 times daily, to use with insurance preferred meter (Patient taking differently: 1 strip by Misc.(Non-Drug; Combo Route) route 4 (four) times daily. To check BG 4 times daily, to use with insurance preferred meter) 450 strip 3    calcium acetate,phosphat bind, (PHOSLO) 667 mg capsule Take 1 capsule (667 mg total) by mouth 3 (three) times daily with meals.      cetirizine (ZYRTEC) 10 MG tablet Take 1 tablet (10 mg total) by mouth every other day.       "ciprofloxacin HCl (CIPRO) 500 MG tablet Take 1 tablet (500 mg total) by mouth once daily. On dialysis days give after HD. Last dose 6/29/22      fluticasone propionate (FLONASE) 50 mcg/actuation nasal spray 2 sprays (100 mcg total) by Each Nostril route once daily.      gabapentin (NEURONTIN) 100 MG capsule Take 1 capsule (100 mg total) by mouth 2 (two) times daily.      hydrALAZINE (APRESOLINE) 50 MG tablet Take 1 tablet (50 mg total) by mouth every 12 (twelve) hours.      HYDROcodone-acetaminophen (NORCO) 5-325 mg per tablet Take 1 tablet by mouth every 6 (six) hours as needed. 15 tablet 0    insulin aspart U-100 (NOVOLOG FLEXPEN U-100 INSULIN) 100 unit/mL (3 mL) InPn pen Inject 5-8 units 3 times per day with food. 1 Box 6    insulin detemir U-100 (LEVEMIR FLEXTOUCH U-100 INSULN) 100 unit/mL (3 mL) InPn pen Inject 15 Units into the skin every evening.      lactulose (CHRONULAC) 10 gram/15 mL solution Take 20 g by mouth 2 (two) times a day.      lancets Misc To use to check blood sugar 4 times daily. To use with insurance approved meter. Patient needs the round, purple one (Patient taking differently: 1 lancet by Misc.(Non-Drug; Combo Route) route 4 (four) times daily. To use to check blood sugar 4 times daily. To use with insurance approved meter. Patient needs the round, purple one) 450 each 3    losartan (COZAAR) 100 MG tablet Take 1 tablet (100 mg total) by mouth once daily.      multivitamin (THERAGRAN) per tablet Take 1 tablet by mouth once daily.      oxybutynin (DITROPAN) 5 MG Tab Take 1 tablet (5 mg total) by mouth 3 (three) times daily.      pen needle, diabetic (BD ULTRA-FINE ROBERT PEN NEEDLE) 32 gauge x 5/32" Ndle To use 4 times per day with insulin injections. (Patient taking differently: 1 pen by Misc.(Non-Drug; Combo Route) route 4 (four) times daily. To use 4 times per day with insulin injections.) 450 each 2    polyethylene glycol (GLYCOLAX) 17 gram PwPk Take 17 g by mouth 2 (two) times " daily as needed.      sodium chloride 0.9% SolP 50 mL with DAPTOmycin 500 mg SolR 500 mg Inject 500 mg into the vein every 48 hours. Until 6/29/22      [DISCONTINUED] atorvastatin (LIPITOR) 80 MG tablet Take 1 tablet (80 mg total) by mouth once daily. (Patient taking differently: Take 80 mg by mouth every evening.) 90 tablet 3    [DISCONTINUED] blood-glucose meter (ACCU-CHEK KAYLIE PLUS METER) Misc To use to check blood sugars 4 times a day 1 each 0    [DISCONTINUED] clorazepate (TRANXENE) 3.75 MG Tab Take 7.5 mg by mouth nightly as needed.       [DISCONTINUED] dextrose (GLUCOSE GEL) 40 % gel Take 37.5 mLs (15,000 mg total) by mouth once as needed (hypoglycemia). 37.5 g 4    [DISCONTINUED] ezetimibe (ZETIA) 10 mg tablet Take 1 tablet (10 mg total) by mouth once daily.      [DISCONTINUED] fenofibrate 160 MG Tab Take 1 tablet (160 mg total) by mouth once daily. 90 tablet 3    [DISCONTINUED] lancing device with lancets (ACCU-CHEK SOFT DEV LANCETS) Kit To check blood sugars 4 times per day 400 each 3    [DISCONTINUED] pantoprazole (PROTONIX) 40 MG tablet Take 1 tablet (40 mg total) by mouth once daily.         Review of Systems:  Neg    Physical Exam:  Constitutional: nad, aao x 3  Heart: rrr, no m/r/g, wwp, no worsening edema, with wound vac  Lungs: ctab, no w/r/r/c, no lb  Abdomen: s/nt/nd, +BS    Results:  Recent Labs   Lab 06/25/22  0440 06/26/22  0710 06/26/22  1459 06/27/22  0625   * 133*  --  131*   K 5.1 5.3* 5.5* 5.9*    98  --  96   CO2 26 24  --  21*   BUN 40* 64*  --  82*   CREATININE 5.5* 7.7*  --  9.3*   ESTGFRAFRICA 9.3* 6.2*  --  4.9*   EGFRNONAA 8.0* 5.4*  --  4.3*   * 89  --  97       Recent Labs   Lab 06/21/22  1143 06/22/22  0618 06/24/22  0601 06/25/22  0440 06/26/22  0710 06/27/22  0625   CALCIUM 10.0   < > 9.7 9.1 9.5 9.6   ALBUMIN 3.4*  --   --   --   --   --    MG 2.5   < > 2.7* 2.5  --  3.0*    < > = values in this interval not displayed.       Recent Labs   Lab  01/30/20  0705 03/10/22  0650   PTH, Intact 466.0 H 387.0 H       No results for input(s): POCTGLUCOSE in the last 168 hours.    Recent Labs   Lab 05/05/22  0320 05/29/22  1324 06/22/22  0618   Hemoglobin A1C 10.6 H 8.3 H 7.5 H       Recent Labs   Lab 06/21/22  1143 06/22/22  0618 06/24/22  0601 06/25/22  0440 06/26/22  0710 06/27/22  0625   WBC 8.92 8.67   < > 9.69 9.67 9.90   HGB 10.7* 8.6*   < > 9.2* 8.8* 8.6*   HCT 36.2* 28.9*   < > 30.7* 30.0* 30.5*    350   < > 318 338 326   MCV 87 87   < > 87 88 91   MCHC 29.6* 29.8*   < > 30.0* 29.3* 28.2*   MONO 9.6  0.9 10.0  0.9  --   --   --   --     < > = values in this interval not displayed.       Recent Labs   Lab 06/21/22  1143   BILITOT 0.4   PROT 8.1   ALBUMIN 3.4*   ALKPHOS 67   ALT 11   AST 15       Recent Labs   Lab 05/29/22  0900 06/21/22  1612   Color, UA Yellow Yellow   Appearance, UA Clear Clear   pH, UA 8.0 >8.0 A   Specific [place], UA 1.015 1.010   Protein, UA 2+ A 3+ A   Glucose, UA 2+ A 2+ A   Ketones, UA Negative Negative   Urobilinogen, UA Negative Negative   Bilirubin (UA) Negative Negative   Occult Blood UA Negative Negative   Nitrite, UA Negative Negative   RBC, UA 1 1   WBC, UA 5 2   Bacteria Rare Negative   Hyaline Casts, UA 0 12 A       Recent Labs   Lab 05/19/21  0300   POC PH 7.273 L   POC PCO2 47.8 H   POC HCO3 22.1 L   POC PO2 22 LL   POC SATURATED O2 30 L   POC BE -5   Sample VENOUS     I have spent >  minutes providing care for this patient for the above diagnoses. These services have included chart/data/imaging review, evaluation, exam, formulation of plan, , note preparation, and discussions with staff involved in this patient's care.    Bryce Craig MD  [facility] [address] [address]  [phone] (p)  [phone] (f)

## 2022-06-27 NOTE — PLAN OF CARE
Patient accepted per Id[place] at [facility], pending auth. Facility submitted this am. CM will follow up with Joni in am for auth status.       06/27/22 5347   Discharge Reassessment   Assessment Type Discharge Planning Reassessment   Did the patient's condition or plan change since previous assessment? Yes   Discharge Plan discussed with: Patient   Discharge Plan A Long-term acute care facility (LTAC)   Discharge Plan B Long-term acute care facility (LTAC)   DME Needed Upon Discharge  none   Post-Acute Status   Post-Acute Authorization Placement   Post-Acute Placement Status Pending payor review/awaiting authorization (if required)   Coverage [employer]   Discharge Delays (!) Post-Acute Set-up

## 2022-06-27 NOTE — PLAN OF CARE
Problem: Skin Injury Risk Increased  Goal: Skin Health and Integrity  Intervention: Promote and Optimize Oral Intake  Flowsheets (Taken 6/27/2022 6425)  Oral Nutrition Promotion: (Therapeutic diet and appropriate supplement ordered)   calorie-dense liquids provided   other (see comments)

## 2022-06-27 NOTE — HPI
Leanna García is a 56 y.o.  female who  has a past medical history of A-fib, Arthritis, Bronchitis, Cardiovascular event risk, ASCVD 10-year risk 6.7% (10/15/2016), Diabetes mellitus, Diabetes mellitus type II, Diabetic ulcer of left midfoot (5/5/2022), Disorder of kidney and ureter, Encounter for blood transfusion, ESRD (end stage renal disease) (5/18/2018), MANJINDER (generalized anxiety disorder) (10/25/2016), History of colon polyps (11/02/2016), Hyperlipidemia, Hypertension, and Stroke.. The patient presented to [facility] on 6/21/2022 with a primary complaint of Foot Injury (Pt sent by MD for evaluation of her left foot.)  Patient is currently undergoing treat at [facility] and followed up with podiatrist today.  Podiatry evaluated patient and referred patient to the ED on account of worsening diabetic foot ulcer.

## 2022-06-27 NOTE — SUBJECTIVE & OBJECTIVE
Review of Systems  Objective:     Vital Signs (Most Recent):  Temp: 98.5 °F (36.9 °C) (06/27/22 1345)  Pulse: 62 (06/27/22 1345)  Resp: 18 (06/27/22 1345)  BP: 133/66 (06/27/22 1345)  SpO2: 95 % (06/27/22 1030) Vital Signs (24h Range):  Temp:  [98.2 °F (36.8 °C)-98.8 °F (37.1 °C)] 98.5 °F (36.9 °C)  Pulse:  [57-76] 62  Resp:  [18] 18  SpO2:  [92 %-95 %] 95 %  BP: ()/(47-84) 133/66     Weight: 89.1 kg (196 lb 6.9 oz)  Body mass index is 29.87 kg/m².    Intake/Output Summary (Last 24 hours) at 6/27/2022 1529  Last data filed at 6/27/2022 1345  Gross per 24 hour   Intake 1100 ml   Output 3000 ml   Net -1900 ml      Physical Exam    Significant Labs: All pertinent labs within the past 24 hours have been reviewed.  Blood Culture: No results for input(s): LABBLOO in the last 48 hours.  BMP:   Recent Labs   Lab 06/27/22  0625   GLU 97   *   K 5.9*   CL 96   CO2 21*   BUN 82*   CREATININE 9.3*   CALCIUM 9.6   MG 3.0*     CBC:   Recent Labs   Lab 06/26/22  0710 06/27/22  0625   WBC 9.67 9.90   HGB 8.8* 8.6*   HCT 30.0* 30.5*    326     Lactic Acid: No results for input(s): LACTATE in the last 48 hours.  Magnesium:   Recent Labs   Lab 06/27/22  0625   MG 3.0*       Significant Imaging: I have reviewed all pertinent imaging results/findings within the past 24 hours.  Imaging Results              X-Ray Foot Complete Bilateral (Final result)  Result time 06/21/22 11:30:31      Final result by Kiel Garvey MD (06/21/22 11:30:31)                   Narrative:    Reason: Diabetic ulcers.    FINDINGS:    Multiple views of the bilateral feet.    LEFT FOOT:    No acute fracture or dislocation observed. Amputation and osteotomy of the fourth digit noted. Pes planus observed. There are moderate degenerative changes of the midfoot bones. There is osteophytosis of the talar neck. No destructive osseous lesion or periosteal reaction observed. Ulcer of the plantar soft tissues of the foot noted. Marked atherosclerosis  observed. Small calcaneal spur.    RIGHT FOOT:    No acute fracture or dislocation. No destructive osseous lesion. There are moderate degenerative changes of the mid foot. Calcaneocuboid degeneration observed. Pes planus noted. Calcaneal spur and Achilles enthesopathy noted. Marked atherosclerosis observed.    IMPRESSION:    1.  Bilateral degenerative changes and pes planus as described.  2.  No destructive osseous lesions observed.  3.  Ulcer in the plantar soft tissues of the left foot.  4.  Marked bilateral atherosclerosis.  5.  Amputation an osteotomy of the left fourth digit.    Electronically signed by:  Kiel Garvey DO  6/21/2022 11:30 AM CDT Workstation: [id number]

## 2022-06-27 NOTE — CONSULTS
Verified with Dr Bruno orders this am.  Left foot wounds- Santyl to wounds prior to wound vac dressing change  wound VAC to plantar foot wounds- bridge the wound VAC  to all 3 wounds on patient's plantar foot. Setting at -175 megahertz    Please place Xeroform followed by ABD pad followed by foam to patient's heel wound and all of the remaining wounds

## 2022-06-27 NOTE — HOSPITAL COURSE
Pt is a 55 y/o F with hx of AFib, arthritis, hyperlipidemia, hypertension, type 2 diabetes, ESRD, diabetic foot ulceration with diabetic foot infection.  Patient initially presented to the hospital at the direction of her podiatrist due to worsening foot ulcerations and infection.  She has ulcerations on both feet.  Podiatry evaluated her here and performed bedside debridements.  She underwent evaluation with vascular surgery who performed angiography with good flow to the right foot and they performed balloon angioplasty of the popliteal artery on the left to restore blood flow to the left lower extremity.  Vascular surgery and Podiatry both feel that her feet her salvageable with proper wound care and antibiotic therapy.  Infectious Disease was consulted and directed the antibiotic recommendations.  She will continue on Unasyn and daptomycin with an end date of 08/02/2022.  She has a wound VAC on her left foot that is bridged to all wounds on the left foot.  This is to continue as an outpatient.  We have recommended that she return to long-term acute care for ongoing management of her chronic severe issues.  I discussed this with her insurance company and she was able to receive authorization for transfer to [facility].  She will continue to follow-up with Wound Care and Podiatry as an outpatient and at the [facility] for management of her foot infections.  I saw the patient on the day of discharge and discussed the plan of care with her.  She was discharged in good condition to [facility] for ongoing management.

## 2022-06-27 NOTE — ASSESSMENT & PLAN NOTE
Morphine 2mg q.4h p.r.n. for pain  Continue IV ABx  Podiatry, Wound Care, ID, Vascular surg on board  Wound care and cultures

## 2022-06-27 NOTE — ASSESSMENT & PLAN NOTE
Continue home medication of hydralazine 50mg BID, losartan 100mg, amlodipine 10mg daily and titrate upwards as needed

## 2022-06-27 NOTE — PROGRESS NOTES
[facility] Medicine  Progress Note    Patient Name: Leanna García  MRN: [id number]  Patient Class: IP- Inpatient   Admission Date: 6/21/2022  Length of Stay: 6 days  Attending Physician: Conchita Douglas MD  Primary Care Provider: Stefano Lopez MD        Subjective:     Principal Problem:Diabetic ulcer of left midfoot        HPI:  Leanna García is a 56 y.o.  female who  has a past medical history of A-fib, Arthritis, Bronchitis, Cardiovascular event risk, ASCVD 10-year risk 6.7% (10/15/2016), Diabetes mellitus, Diabetes mellitus type II, Diabetic ulcer of left midfoot (5/5/2022), Disorder of kidney and ureter, Encounter for blood transfusion, ESRD (end stage renal disease) (5/18/2018), MANJINDER (generalized anxiety disorder) (10/25/2016), History of colon polyps (11/02/2016), Hyperlipidemia, Hypertension, and Stroke.. The patient presented to [facility] on 6/21/2022 with a primary complaint of Foot Injury (Pt sent by MD for evaluation of her left foot.)  Patient is currently undergoing treat at [facility] and followed up with podiatrist today.  Podiatry evaluated patient and referred patient to the ED on account of worsening diabetic foot ulcer.       Overview/Hospital Course:  6/22: Patient is doing well. Awaiting vascular consult. No concerns/issues overnight reported by the patient or the nursing staff.     6/23: Case management consulted for placement, still awaiting vascular surgeons eval. ID [email or web address] [email or web address]      6/24: Patient had  peripheral angiography and PTA of left popliteal artery today. WBC scan pending     6/25:  Patient states that she feels better today and slept well.  She had dialysis done yesterday.  H&H stable, currently awaiting WBC scan     6/26:  Patient is doing well, WBC scan shows findings indicative of active soft tissue inflammatory response changes due to deep penetrating abscess and less conspicuous for underlying osteomyelitis.   Potassium elevated which is probably secondary to ESRD, would treat with insulin and D50 as patient is not scheduled for dialysis until tomorrow.  No acute overnight event.    6/27: potassuim elevated today which would be corrected with dialysis. Pending placement.           Review of Systems  Objective:     Vital Signs (Most Recent):  Temp: 98.5 °F (36.9 °C) (06/27/22 1345)  Pulse: 62 (06/27/22 1345)  Resp: 18 (06/27/22 1345)  BP: 133/66 (06/27/22 1345)  SpO2: 95 % (06/27/22 1030) Vital Signs (24h Range):  Temp:  [98.2 °F (36.8 °C)-98.8 °F (37.1 °C)] 98.5 °F (36.9 °C)  Pulse:  [57-76] 62  Resp:  [18] 18  SpO2:  [92 %-95 %] 95 %  BP: ()/(47-84) 133/66     Weight: 89.1 kg (196 lb 6.9 oz)  Body mass index is 29.87 kg/m².    Intake/Output Summary (Last 24 hours) at 6/27/2022 1529  Last data filed at 6/27/2022 1345  Gross per 24 hour   Intake 1100 ml   Output 3000 ml   Net -1900 ml      Physical Exam    Significant Labs: All pertinent labs within the past 24 hours have been reviewed.  Blood Culture: No results for input(s): LABBLOO in the last 48 hours.  BMP:   Recent Labs   Lab 06/27/22  0625   GLU 97   *   K 5.9*   CL 96   CO2 21*   BUN 82*   CREATININE 9.3*   CALCIUM 9.6   MG 3.0*     CBC:   Recent Labs   Lab 06/26/22  0710 06/27/22  0625   WBC 9.67 9.90   HGB 8.8* 8.6*   HCT 30.0* 30.5*    326     Lactic Acid: No results for input(s): LACTATE in the last 48 hours.  Magnesium:   Recent Labs   Lab 06/27/22  0625   MG 3.0*       Significant Imaging: I have reviewed all pertinent imaging results/findings within the past 24 hours.  Imaging Results              X-Ray Foot Complete Bilateral (Final result)  Result time 06/21/22 11:30:31      Final result by Kiel Garvey MD (06/21/22 11:30:31)                   Narrative:    Reason: Diabetic ulcers.    FINDINGS:    Multiple views of the bilateral feet.    LEFT FOOT:    No acute fracture or dislocation observed. Amputation and osteotomy of the fourth  digit noted. Pes planus observed. There are moderate degenerative changes of the midfoot bones. There is osteophytosis of the talar neck. No destructive osseous lesion or periosteal reaction observed. Ulcer of the plantar soft tissues of the foot noted. Marked atherosclerosis observed. Small calcaneal spur.    RIGHT FOOT:    No acute fracture or dislocation. No destructive osseous lesion. There are moderate degenerative changes of the mid foot. Calcaneocuboid degeneration observed. Pes planus noted. Calcaneal spur and Achilles enthesopathy noted. Marked atherosclerosis observed.    IMPRESSION:    1.  Bilateral degenerative changes and pes planus as described.  2.  No destructive osseous lesions observed.  3.  Ulcer in the plantar soft tissues of the left foot.  4.  Marked bilateral atherosclerosis.  5.  Amputation an osteotomy of the left fourth digit.    Electronically signed by:  Kiel Garvey DO  6/21/2022 11:30 AM CDT Workstation: [id number]                                        Assessment/Plan:      * Diabetic ulcer of left midfoot  Morphine 2mg q.4h p.r.n. for pain  Continue IV ABx  Podiatry, Wound Care, ID, Vascular surg on board  Wound care and cultures      Type 2 diabetes mellitus with kidney complication, with long-term current use of insulin  Continue Levemir 15 units nightly  Insulin sliding scale  Regular accuchek      ESRD (end stage renal disease)  Continue dialysis MWF    Hypertension associated with diabetes  Continue home medication of hydralazine 50mg BID, losartan 100mg, amlodipine 10mg daily and titrate upwards as needed      PAD (peripheral artery disease)  Angiography done 6/24/22  Atorvastatin 40 mg daily  Aspirin 81 mg daily        VTE Risk Mitigation (From admission, onward)         Ordered     enoxaparin injection 30 mg  Daily         06/21/22 1659     IP VTE HIGH RISK PATIENT  Once         06/21/22 1659     Place sequential compression device  Until discontinued         06/21/22 1659                 Discharge Planning   ERI: 6/30/2022     Code Status: Full Code   Is the patient medically ready for discharge?:     Reason for patient still in hospital (select all that apply): Pending disposition  Discharge Plan A: Long-term acute care facility (LTAC)   Discharge Delays: None known at this time              Conchita Douglas MD  Department of Hospital Medicine   [facility]

## 2022-06-28 PROBLEM — L97.512 ULCER OF RIGHT FOOT WITH FAT LAYER EXPOSED: Status: ACTIVE | Noted: 2022-06-28

## 2022-06-28 LAB
ANION GAP SERPL CALC-SCNC: 9 MMOL/L (ref 8–16)
BUN SERPL-MCNC: 51 MG/DL (ref 6–20)
CALCIUM SERPL-MCNC: 10 MG/DL (ref 8.7–10.5)
CHLORIDE SERPL-SCNC: 97 MMOL/L (ref 95–110)
CO2 SERPL-SCNC: 30 MMOL/L (ref 23–29)
CREAT SERPL-MCNC: 6.8 MG/DL (ref 0.5–1.4)
ERYTHROCYTE [DISTWIDTH] IN BLOOD BY AUTOMATED COUNT: 16.9 % (ref 11.5–14.5)
EST. GFR  (AFRICAN AMERICAN): 7.2 ML/MIN/1.73 M^2
EST. GFR  (NON AFRICAN AMERICAN): 6.2 ML/MIN/1.73 M^2
GLUCOSE SERPL-MCNC: 110 MG/DL (ref 70–110)
GLUCOSE SERPL-MCNC: 122 MG/DL (ref 70–110)
GLUCOSE SERPL-MCNC: 138 MG/DL (ref 70–110)
GLUCOSE SERPL-MCNC: 138 MG/DL (ref 70–110)
GLUCOSE SERPL-MCNC: 168 MG/DL (ref 70–110)
HCT VFR BLD AUTO: 30.9 % (ref 37–48.5)
HGB BLD-MCNC: 9 G/DL (ref 12–16)
MAGNESIUM SERPL-MCNC: 2.8 MG/DL (ref 1.6–2.6)
MCH RBC QN AUTO: 25.4 PG (ref 27–31)
MCHC RBC AUTO-ENTMCNC: 29.1 G/DL (ref 32–36)
MCV RBC AUTO: 87 FL (ref 82–98)
PLATELET # BLD AUTO: 359 K/UL (ref 150–450)
PMV BLD AUTO: 10.3 FL (ref 9.2–12.9)
POTASSIUM SERPL-SCNC: 4.8 MMOL/L (ref 3.5–5.1)
RBC # BLD AUTO: 3.54 M/UL (ref 4–5.4)
SODIUM SERPL-SCNC: 136 MMOL/L (ref 136–145)
WBC # BLD AUTO: 9.3 K/UL (ref 3.9–12.7)

## 2022-06-28 PROCEDURE — 80048 BASIC METABOLIC PNL TOTAL CA: CPT | Performed by: STUDENT IN AN ORGANIZED HEALTH CARE EDUCATION/TRAINING PROGRAM

## 2022-06-28 PROCEDURE — 85027 COMPLETE CBC AUTOMATED: CPT | Performed by: STUDENT IN AN ORGANIZED HEALTH CARE EDUCATION/TRAINING PROGRAM

## 2022-06-28 PROCEDURE — 25000003 PHARM REV CODE 250: Performed by: SURGERY

## 2022-06-28 PROCEDURE — 99232 PR SUBSEQUENT HOSPITAL CARE,LEVL II: ICD-10-PCS | Mod: 25,,, | Performed by: PODIATRIST

## 2022-06-28 PROCEDURE — 11042 DBRDMT SUBQ TIS 1ST 20SQCM/<: CPT | Mod: ,,, | Performed by: PODIATRIST

## 2022-06-28 PROCEDURE — 63600175 PHARM REV CODE 636 W HCPCS: Performed by: SURGERY

## 2022-06-28 PROCEDURE — 11042 DEBRIDEMENT: ICD-10-PCS | Mod: ,,, | Performed by: PODIATRIST

## 2022-06-28 PROCEDURE — 36415 COLL VENOUS BLD VENIPUNCTURE: CPT | Performed by: STUDENT IN AN ORGANIZED HEALTH CARE EDUCATION/TRAINING PROGRAM

## 2022-06-28 PROCEDURE — 83735 ASSAY OF MAGNESIUM: CPT | Performed by: STUDENT IN AN ORGANIZED HEALTH CARE EDUCATION/TRAINING PROGRAM

## 2022-06-28 PROCEDURE — 99232 SBSQ HOSP IP/OBS MODERATE 35: CPT | Mod: 25,,, | Performed by: PODIATRIST

## 2022-06-28 PROCEDURE — 12000002 HC ACUTE/MED SURGE SEMI-PRIVATE ROOM

## 2022-06-28 RX ADMIN — LACTULOSE 20 G: 20 SOLUTION ORAL at 08:06

## 2022-06-28 RX ADMIN — CALCIUM ACETATE 667 MG: 667 CAPSULE ORAL at 11:06

## 2022-06-28 RX ADMIN — IBUPROFEN 400 MG: 400 TABLET ORAL at 08:06

## 2022-06-28 RX ADMIN — OXYBUTYNIN CHLORIDE 5 MG: 5 TABLET ORAL at 05:06

## 2022-06-28 RX ADMIN — AZELASTINE HYDROCHLORIDE 137 MCG: 137 SPRAY, METERED NASAL at 08:06

## 2022-06-28 RX ADMIN — CLOPIDOGREL BISULFATE 75 MG: 75 TABLET, FILM COATED ORAL at 08:06

## 2022-06-28 RX ADMIN — COLLAGENASE SANTYL: 250 OINTMENT TOPICAL at 08:06

## 2022-06-28 RX ADMIN — FLUTICASONE PROPIONATE 100 MCG: 50 SPRAY, METERED NASAL at 08:06

## 2022-06-28 RX ADMIN — AMLODIPINE BESYLATE 10 MG: 5 TABLET ORAL at 08:06

## 2022-06-28 RX ADMIN — ASPIRIN 81 MG: 81 TABLET, COATED ORAL at 08:06

## 2022-06-28 RX ADMIN — ATORVASTATIN CALCIUM 80 MG: 40 TABLET, FILM COATED ORAL at 08:06

## 2022-06-28 RX ADMIN — ENOXAPARIN SODIUM 30 MG: 30 INJECTION SUBCUTANEOUS at 05:06

## 2022-06-28 RX ADMIN — INSULIN DETEMIR 15 UNITS: 100 INJECTION, SOLUTION SUBCUTANEOUS at 09:06

## 2022-06-28 RX ADMIN — AMPICILLIN SODIUM AND SULBACTAM SODIUM 3 G: 2; 1 INJECTION, POWDER, FOR SOLUTION INTRAMUSCULAR; INTRAVENOUS at 12:06

## 2022-06-28 RX ADMIN — GABAPENTIN 100 MG: 100 CAPSULE ORAL at 08:06

## 2022-06-28 RX ADMIN — OXYBUTYNIN CHLORIDE 5 MG: 5 TABLET ORAL at 08:06

## 2022-06-28 RX ADMIN — HYDRALAZINE HYDROCHLORIDE 50 MG: 25 TABLET ORAL at 08:06

## 2022-06-28 RX ADMIN — CALCIUM ACETATE 667 MG: 667 CAPSULE ORAL at 05:06

## 2022-06-28 RX ADMIN — AMPICILLIN SODIUM AND SULBACTAM SODIUM 3 G: 2; 1 INJECTION, POWDER, FOR SOLUTION INTRAMUSCULAR; INTRAVENOUS at 11:06

## 2022-06-28 RX ADMIN — LOSARTAN POTASSIUM 100 MG: 50 TABLET, FILM COATED ORAL at 08:06

## 2022-06-28 RX ADMIN — CALCIUM ACETATE 667 MG: 667 CAPSULE ORAL at 08:06

## 2022-06-28 NOTE — PROGRESS NOTES
[facility]  Podiatry  Progress Note    Patient Name: Leanna García  MRN: [id number]  Admission Date: 6/21/2022  Hospital Length of Stay: 7 days  Attending Physician: Conchita Douglas MD  Primary Care Provider: Stefano Lopez MD     Subjective:     Interval History: patient resting in bed. Wound vac in place in left foot.  Wound care nurse contacted me yesterday regarding worsening of right foot wounds.    Patient is awaiting transfer to [facility].        Follow-up For: Procedure(s) (LRB):  Angiogram, Abdominal Aorta W/ Extremity Runoff (N/A)  PTA, Superficial Femoral Artery    Post-Operative Day: 4 Days Post-Op    Scheduled Meds:   amLODIPine  10 mg Oral Daily    ampicillin-sulbactim (UNASYN) IVPB  3 g Intravenous Q12H    aspirin  81 mg Oral Daily    atorvastatin  80 mg Oral QHS    azelastine  1 spray Nasal BID    calcium acetate(phosphat bind)  667 mg Oral TID WM    cetirizine  10 mg Oral Every other day    clopidogreL  75 mg Oral Daily    collagenase   Topical (Top) Daily    DAPTOmycin (CUBICIN)  IV  500 mg Intravenous Q48H    enoxaparin  30 mg Subcutaneous Daily    epoetin chris-ebpx (RETACRIT) injection  50 Units/kg Intravenous Every Mon, Wed, Fri    fluticasone propionate  2 spray Each Nostril Daily    gabapentin  100 mg Oral BID    hydrALAZINE  50 mg Oral Q12H    insulin detemir U-100  15 Units Subcutaneous QHS    lactulose  20 g Oral BID    losartan  100 mg Oral Daily    oxybutynin  5 mg Oral TID     Continuous Infusions:  PRN Meds:acetaminophen, dextrose 50%, dextrose 50%, glucagon (human recombinant), glucose, glucose, hydrALAZINE, ibuprofen, insulin aspart U-100, morphine, ondansetron, ondansetron, sodium chloride 0.9%    Review of Systems   Constitutional:  Negative for chills, fatigue, fever and unexpected weight change.   HENT:  Negative for hearing loss and trouble swallowing.    Eyes:  Negative for photophobia and visual disturbance.   Respiratory:  Negative for cough,  shortness of breath and wheezing.    Cardiovascular:  Negative for chest pain, palpitations and leg swelling.   Gastrointestinal:  Negative for abdominal pain and nausea.   Genitourinary:  Negative for dysuria and frequency.   Musculoskeletal:  Negative for arthralgias, back pain, gait problem, joint swelling and myalgias.   Skin:  Positive for wound. Negative for rash.   Neurological:  Negative for tremors, seizures, weakness, numbness and headaches.   Hematological:  Does not bruise/bleed easily.   Objective:     Vital Signs (Most Recent):  Temp: 97.9 °F (36.6 °C) (06/28/22 0434)  Pulse: (!) 58 (06/28/22 0434)  Resp: 18 (06/28/22 0434)  BP: 122/67 (06/28/22 0434)  SpO2: (!) 94 % (06/28/22 0434)   Vital Signs (24h Range):  Temp:  [97.9 °F (36.6 °C)-98.9 °F (37.2 °C)] 97.9 °F (36.6 °C)  Pulse:  [57-72] 58  Resp:  [18] 18  SpO2:  [92 %-95 %] 94 %  BP: ()/(47-77) 122/67     Weight: 89.1 kg (196 lb 6.9 oz)  Body mass index is 29.87 kg/m².    Foot Exam              Laboratory:  All pertinent labs reviewed within the last 24 hours.    Diagnostic Results:  I have reviewed all pertinent imaging results/findings within the past 24 hours.    Assessment/Plan:     * Diabetic ulcer of left midfoot  Patent anterior tibial artery.  Vascular intervention successfully opened this artery.  no patent posterior tibial artery      Continue would vac on left foot- wound VAC is bridged to all wounds of patient's plantar foot    Recommend Xeroform followed by bulky wrap with ABD and extra foams to heel wound and great toe wound    Float heels at all times    Pillow boots as well    vicky and glucerna daily- a1c is much improved!      LTAC following discharge for aggressive wound care with wound VAC, and IV antibiotics.    Ulcer of right foot with fat layer exposed  Bedside debridement, see procedure note    Recommend Santyl followed by Aquacel Ag followed by bulky wrap to right 2nd toe wound.    Recommend Betadine painted daily to  right great toe, dorsal scab        Alivia Bruno, BOYM  Podiatry  [facility]

## 2022-06-28 NOTE — CARE UPDATE
CM received request from [facility] for peer to peer prior to approval or denial of LTAC auth. CM notiifed Dr Millard to inquire if he would like to do a peer to peer. Deadline to arrange peer to peer is 6/30 at 9 am. CM waiting on Dr Millard decision.

## 2022-06-28 NOTE — PLAN OF CARE
Pt compliant with POC. RR even and unlabored . No signs of distress noted. Will continue to monitor.     Problem: Adult Inpatient Plan of Care  Goal: Plan of Care Review  Outcome: Ongoing, Progressing  Goal: Patient-Specific Goal (Individualized)  Outcome: Ongoing, Progressing  Goal: Absence of Hospital-Acquired Illness or Injury  Outcome: Ongoing, Progressing  Goal: Optimal Comfort and Wellbeing  Outcome: Ongoing, Progressing  Goal: Readiness for Transition of Care  Outcome: Ongoing, Progressing     Problem: Diabetes Comorbidity  Goal: Blood Glucose Level Within Targeted Range  Outcome: Ongoing, Progressing     Problem: Diabetes Comorbidity  Goal: Blood Glucose Level Within Targeted Range  Outcome: Ongoing, Progressing     Problem: Infection  Goal: Absence of Infection Signs and Symptoms  Outcome: Ongoing, Progressing

## 2022-06-28 NOTE — ASSESSMENT & PLAN NOTE
Morphine 2mg q.4h p.r.n. for pain  Continue IV ABx  Appreciate infectious disease recommendations  Podiatry, Wound Care, ID, Vascular surg on board  Wound care and cultures > growing Acinetobacter and Staph  Wound debrided and wound VAC in place  Pending transfer to LTAC for ongoing care  Question of length of antibiotic therapy and possible regimen that could be dosed with dialysis

## 2022-06-28 NOTE — PROGRESS NOTES
[facility]  Adult Nutrition   Progress Note (Follow-Up)    SUMMARY      Recommendations:   1. Continue Current diet as tolerated.  2. Continue Nepro with meals.       Goals:   Goals: 1. Patient to consume >/= 75% estimated needs. 2. Patient labs will trend to target range.    Dietitian Rounds Brief  Patient s/p right foot debridement by podiatrist this am. Plan of care is to d/c soon to LTAC. Nursing reports good intake of po meals and supplements. Last BM 6/6/22. RD to follow PRN.    Diet order: Renal Consistent Carbohydrate, diet    Oral Supplement: Nepro TID    % Intake of Estimated Energy Needs: 75 - 100 %  % Meal Intake: 75 - 100 %    Estimated/Assessed Needs  Weight Used For Calorie Calculations: 89.1 kg (196 lb 6.9 oz)  Energy Calorie Requirements (kcal): 8097-5869 kcal/day (20-25 kcal/kg)     Protein Requirements: 107-133 gm/day (1.2-1.5 gm/kg)  Weight Used For Protein Calculations: 89.1 kg (196 lb 6.9 oz)  Fluid Requirements (mL): 1782 ml/day or per MD  Estimated Fluid Requirement Method: RDA Method  RDA Method (mL): 1782       Weight History:  Wt Readings from Last 5 Encounters:   06/21/22 89.1 kg (196 lb 6.9 oz)   06/13/22 89.9 kg (198 lb 3.1 oz)   06/02/22 86.2 kg (190 lb)   05/16/22 91.8 kg (202 lb 6.1 oz)   05/06/22 86.6 kg (191 lb)        Reason for Assessment  Reason For Assessment: identified at risk by screening criteria (diabetic foot ulcer)  Relevant Medical History: ESRD- on HD, T2DM, HTN, CHF, MANJINDER, iron deficiency anemia, TIA  Interdisciplinary Rounds: did not attend    Medications:Pertinent Medications Reviewed  Scheduled Meds:   amLODIPine  10 mg Oral Daily    ampicillin-sulbactim (UNASYN) IVPB  3 g Intravenous Q12H    aspirin  81 mg Oral Daily    atorvastatin  80 mg Oral QHS    azelastine  1 spray Nasal BID    calcium acetate(phosphat bind)  667 mg Oral TID WM    cetirizine  10 mg Oral Every other day    clopidogreL  75 mg Oral Daily    collagenase   Topical (Top)  Daily    DAPTOmycin (CUBICIN)  IV  500 mg Intravenous Q48H    enoxaparin  30 mg Subcutaneous Daily    epoetin chris-ebpx (RETACRIT) injection  50 Units/kg Intravenous Every Mon, Wed, Fri    fluticasone propionate  2 spray Each Nostril Daily    gabapentin  100 mg Oral BID    hydrALAZINE  50 mg Oral Q12H    insulin detemir U-100  15 Units Subcutaneous QHS    lactulose  20 g Oral BID    losartan  100 mg Oral Daily    oxybutynin  5 mg Oral TID     Continuous Infusions:  PRN Meds:.acetaminophen, dextrose 50%, dextrose 50%, glucagon (human recombinant), glucose, glucose, hydrALAZINE, ibuprofen, insulin aspart U-100, morphine, ondansetron, ondansetron, sodium chloride 0.9%    Labs: Pertinent Labs Reviewed  Clinical Chemistry:  Recent Labs   Lab 06/21/22  1143 06/28/22  0510   * 136   K 4.6 4.8   CL 92* 97   CO2 31* 30*   * 122*   BUN 44* 51*   CREATININE 5.8* 6.8*   CALCIUM 10.0 10.0   PROT 8.1  --    ALBUMIN 3.4*  --    BILITOT 0.4  --    ALKPHOS 67  --    AST 15  --    ALT 11  --    ANIONGAP 11 9   ESTGFRAFRICA 8.7* 7.2*   EGFRNONAA 7.5* 6.2*   MG 2.5 2.8*    < > = values in this interval not displayed.     CBC:   Recent Labs   Lab 06/28/22  0510   WBC 9.30   RBC 3.54*   HGB 9.0*   HCT 30.9*      MCV 87   MCH 25.4*   MCHC 29.1*     Cardiac Profile:  Recent Labs   Lab 06/22/22  0618   CPK 31     Inflammatory Labs:  Recent Labs   Lab 06/24/22  0601 06/25/22  0440 06/26/22  0710   CRP 5.52* 5.34* 5.23*     Diabetes:  Recent Labs   Lab 06/22/22  0618   HGBA1C 7.5*     Monitor and Evaluation  Food and Nutrient Intake: energy intake, food and beverage intake  Food and Nutrient Adminstration: diet order  Knowledge/Beliefs/Attitudes: food and nutrition knowledge/skill  Physical Activity and Function: nutrition-related ADLs and IADLs  Anthropometric Measurements: weight, weight change, body mass index  Biochemical Data, Medical Tests and Procedures: electrolyte and renal panel, lipid profile,  gastrointestinal profile, glucose/endocrine profile, inflammatory profile  Nutrition-Focused Physical Findings: overall appearance     Nutrition Risk  Level of Risk/Frequency of Follow-up: moderate - high     Nutrition Follow-Up  RD Follow-up?: Yes    Sonia Christiansen RD 06/28/2022 10:30 AM

## 2022-06-28 NOTE — PROCEDURES
"Leanna García is a 56 y.o. female patient.    Temp: 98.1 °F (36.7 °C) (06/28/22 0821)  Pulse: 60 (06/28/22 0821)  Resp: 18 (06/28/22 0821)  BP: 138/73 (06/28/22 0821)  SpO2: 96 % (06/28/22 0821)  Weight: 89.1 kg (196 lb 6.9 oz) (06/21/22 2300)  Height: 5' 8" (172.7 cm) (06/21/22 2300)  Mallampati Scale: Class II  ASA Classification: Class 2    Debridement    Date/Time: 6/28/2022 8:22 AM  Performed by: Alivia Bruno DPM  Authorized by: Alivia Bruno DPM     Time out: Immediately prior to procedure a "time out" was called to verify the correct patient, procedure, equipment, support staff and site/side marked as required.    Consent Done?:  Yes (Verbal)    Preparation: Patient was prepped and draped in usual sterile fashion    Local anesthesia used?: No      Wound Details:    Location:  Right foot    Location:  Right 2nd Toe    Type of Debridement:  Excisional       Length (cm):  1       Area (sq cm):  0.8       Width (cm):  0.8       Percent Debrided (%):  100       Depth (cm):  0.2       Total Area Debrided (sq cm):  0.8    Depth of debridement:  Subcutaneous tissue    Tissue debrided:  Subcutaneous    Devitalized tissue debrided:  Biofilm, Callus, Slough and Other    Instruments:  Forceps, Blade, Curette and Nippers    Bleeding:  Minimal  Hemostasis Achieved: Yes    Method Used:  Pressure  Patient tolerance:  Patient tolerated the procedure well with no immediate complications        6/28/2022    "

## 2022-06-28 NOTE — SUBJECTIVE & OBJECTIVE
Interval History:  Feeling well this morning.  Bedside debridement to foot wounds done by Dr. Bruno this morning.  Wound VAC in place.  Plans to continue antibiotics and wound care at [facility].  Pending authorization.  No fevers or chills, nausea, vomiting    Review of Systems   All other systems reviewed and are negative.  Objective:     Vital Signs (Most Recent):  Temp: 98.1 °F (36.7 °C) (06/28/22 0821)  Pulse: 60 (06/28/22 0821)  Resp: 18 (06/28/22 0821)  BP: 138/73 (06/28/22 0821)  SpO2: 96 % (06/28/22 0821) Vital Signs (24h Range):  Temp:  [97.9 °F (36.6 °C)-98.9 °F (37.2 °C)] 98.1 °F (36.7 °C)  Pulse:  [57-72] 60  Resp:  [18] 18  SpO2:  [92 %-96 %] 96 %  BP: ()/(47-77) 138/73     Weight: 89.1 kg (196 lb 6.9 oz)  Body mass index is 29.87 kg/m².    Intake/Output Summary (Last 24 hours) at 6/28/2022 1021  Last data filed at 6/27/2022 1345  Gross per 24 hour   Intake 500 ml   Output 3000 ml   Net -2500 ml      Physical Exam  Constitutional:       Appearance: Normal appearance. She is normal weight.   HENT:      Head: Normocephalic and atraumatic.      Nose: Nose normal.      Mouth/Throat:      Mouth: Mucous membranes are moist.   Eyes:      Conjunctiva/sclera: Conjunctivae normal.      Pupils: Pupils are equal, round, and reactive to light.   Cardiovascular:      Rate and Rhythm: Normal rate and regular rhythm.      Pulses: Normal pulses.      Heart sounds: Normal heart sounds. No murmur heard.    No friction rub. No gallop.   Pulmonary:      Effort: Pulmonary effort is normal.      Breath sounds: Normal breath sounds. No wheezing or rales.   Abdominal:      General: Abdomen is flat. Bowel sounds are normal. There is no distension.      Palpations: Abdomen is soft.      Tenderness: There is no abdominal tenderness. There is no guarding.   Musculoskeletal:         General: Tenderness present. No swelling. Normal range of motion.      Cervical back: Normal range of motion and neck supple.   Skin:     General:  Skin is warm and dry.      Comments: Bilateral lower extremities with bandages in place.  Wound VAC in place to left foot with good seal and functioning properly.   Neurological:      General: No focal deficit present.      Mental Status: She is alert.   Psychiatric:         Mood and Affect: Mood normal.         Thought Content: Thought content normal.         Judgment: Judgment normal.       Significant Labs: All pertinent labs within the past 24 hours have been reviewed.    Significant Imaging: I have reviewed all pertinent imaging results/findings within the past 24 hours.

## 2022-06-28 NOTE — CARE UPDATE
Dr Millard would like peer to peer. CM contacted [facility] and arranged peer to peer for 10 am on  6/29 with [facility] medical director Dr Palacios.Patient updated on status of LTAC referral.

## 2022-06-28 NOTE — ASSESSMENT & PLAN NOTE
Patent anterior tibial artery.  Vascular intervention successfully opened this artery.  no patent posterior tibial artery      Continue would vac on left foot- wound VAC is bridged to all wounds of patient's plantar foot    Recommend Xeroform followed by bulky wrap with ABD and extra foams to heel wound and great toe wound    Float heels at all times    Pillow boots as well    vicky and glucerna daily- a1c is much improved!      LTAC following discharge for aggressive wound care with wound VAC, and IV antibiotics.

## 2022-06-28 NOTE — ASSESSMENT & PLAN NOTE
Bedside debridement, see procedure note    Recommend Santyl followed by Aquacel Ag followed by bulky wrap to right 2nd toe wound.    Recommend Betadine painted daily to right great toe, dorsal scab

## 2022-06-28 NOTE — PROGRESS NOTES
INPATIENT NEPHROLOGY Progress Note  [facility]    Patient Name: Leanna García  Date: 06/28/2022    Reason for consultation: ESRD    Chief Complaint:   Chief Complaint   Patient presents with    Foot Injury     Pt sent by MD for evaluation of her left foot.     History of Present Illness:  57 y/o F with ESRD on HD MWF, HTN, HLD, and DMII who was sent in by podiatry from [facility] for worsening ulceration and decreased blood flow to L foot with known diabetic wound despite IV antbx, recommending vascular eval and BKA, consulted for dialysis.    Interval History:  6/22- off floor  6/23- no issues with HD yest- got off 2L  6/24- gong for angiogram today with Dr. Haas  6/25- had PTA of the L popliteal artery, no issues with HD yest- got off 3L  6/26- working on placement  6/27 VSS, no new complains. Hd today. s/p debridement yesterday.  6/28 VSS, no new complains. HD in AM.    Plan of Care:    ESRD on HD MWF  Hyperkalemia  Hyponatremia  HTN  Secondary HPT  Anemia of CKD  Diabetic foot ulcer; PVD s/p PTA L popliteal artery    Plan:    - Continue HD MWF.   - Continue UF to dry weight.  - Continue renal diet, 1.5L fluid restriction.  - Continue FERMÍN with HD.  - Dose antbx for CrCl < 10/HD. Angiogram reviewed.  - if K remains elevated, will start Lokelma.    Thank you for allowing us to participate in this patient's care. We will continue to follow.    Vital Signs:  Temp Readings from Last 3 Encounters:   06/28/22 98.1 °F (36.7 °C)   06/21/22 98.4 °F (36.9 °C)   06/14/22 97.9 °F (36.6 °C)       Pulse Readings from Last 3 Encounters:   06/28/22 60   06/21/22 76   06/14/22 73       BP Readings from Last 3 Encounters:   06/28/22 138/73   06/21/22 (!) 154/84   06/14/22 138/64       Weight:  Wt Readings from Last 3 Encounters:   06/21/22 89.1 kg (196 lb 6.9 oz)   06/13/22 89.9 kg (198 lb 3.1 oz)   06/02/22 86.2 kg (190 lb)       Medications:  Scheduled Meds:   amLODIPine  10 mg Oral Daily     ampicillin-sulbactim (UNASYN) IVPB  3 g Intravenous Q12H    aspirin  81 mg Oral Daily    atorvastatin  80 mg Oral QHS    azelastine  1 spray Nasal BID    calcium acetate(phosphat bind)  667 mg Oral TID WM    cetirizine  10 mg Oral Every other day    clopidogreL  75 mg Oral Daily    collagenase   Topical (Top) Daily    DAPTOmycin (CUBICIN)  IV  500 mg Intravenous Q48H    enoxaparin  30 mg Subcutaneous Daily    epoetin chris-ebpx (RETACRIT) injection  50 Units/kg Intravenous Every Mon, Wed, Fri    fluticasone propionate  2 spray Each Nostril Daily    gabapentin  100 mg Oral BID    hydrALAZINE  50 mg Oral Q12H    insulin detemir U-100  15 Units Subcutaneous QHS    lactulose  20 g Oral BID    losartan  100 mg Oral Daily    oxybutynin  5 mg Oral TID     Continuous Infusions:  PRN Meds:.acetaminophen, dextrose 50%, dextrose 50%, glucagon (human recombinant), glucose, glucose, hydrALAZINE, ibuprofen, insulin aspart U-100, morphine, ondansetron, ondansetron, sodium chloride 0.9%  No current facility-administered medications on file prior to encounter.     Current Outpatient Medications on File Prior to Encounter   Medication Sig Dispense Refill    amLODIPine (NORVASC) 10 MG tablet Take 1 tablet (10 mg total) by mouth once daily.      aspirin (ECOTRIN) 81 MG EC tablet Take 81 mg by mouth once daily.      azelastine (ASTELIN) 137 mcg (0.1 %) nasal spray 1 spray by Nasal route 2 (two) times a day.      blood sugar diagnostic Strp To check BG 4 times daily, to use with insurance preferred meter (Patient taking differently: 1 strip by Misc.(Non-Drug; Combo Route) route 4 (four) times daily. To check BG 4 times daily, to use with insurance preferred meter) 450 strip 3    calcium acetate,phosphat bind, (PHOSLO) 667 mg capsule Take 1 capsule (667 mg total) by mouth 3 (three) times daily with meals.      cetirizine (ZYRTEC) 10 MG tablet Take 1 tablet (10 mg total) by mouth every other day.      ciprofloxacin  "HCl (CIPRO) 500 MG tablet Take 1 tablet (500 mg total) by mouth once daily. On dialysis days give after HD. Last dose 6/29/22      fluticasone propionate (FLONASE) 50 mcg/actuation nasal spray 2 sprays (100 mcg total) by Each Nostril route once daily.      gabapentin (NEURONTIN) 100 MG capsule Take 1 capsule (100 mg total) by mouth 2 (two) times daily.      hydrALAZINE (APRESOLINE) 50 MG tablet Take 1 tablet (50 mg total) by mouth every 12 (twelve) hours.      HYDROcodone-acetaminophen (NORCO) 5-325 mg per tablet Take 1 tablet by mouth every 6 (six) hours as needed. 15 tablet 0    insulin aspart U-100 (NOVOLOG FLEXPEN U-100 INSULIN) 100 unit/mL (3 mL) InPn pen Inject 5-8 units 3 times per day with food. 1 Box 6    insulin detemir U-100 (LEVEMIR FLEXTOUCH U-100 INSULN) 100 unit/mL (3 mL) InPn pen Inject 15 Units into the skin every evening.      lactulose (CHRONULAC) 10 gram/15 mL solution Take 20 g by mouth 2 (two) times a day.      lancets Misc To use to check blood sugar 4 times daily. To use with insurance approved meter. Patient needs the round, purple one (Patient taking differently: 1 lancet by Misc.(Non-Drug; Combo Route) route 4 (four) times daily. To use to check blood sugar 4 times daily. To use with insurance approved meter. Patient needs the round, purple one) 450 each 3    losartan (COZAAR) 100 MG tablet Take 1 tablet (100 mg total) by mouth once daily.      multivitamin (THERAGRAN) per tablet Take 1 tablet by mouth once daily.      oxybutynin (DITROPAN) 5 MG Tab Take 1 tablet (5 mg total) by mouth 3 (three) times daily.      pen needle, diabetic (BD ULTRA-FINE ROBERT PEN NEEDLE) 32 gauge x 5/32" Ndle To use 4 times per day with insulin injections. (Patient taking differently: 1 pen by Misc.(Non-Drug; Combo Route) route 4 (four) times daily. To use 4 times per day with insulin injections.) 450 each 2    polyethylene glycol (GLYCOLAX) 17 gram PwPk Take 17 g by mouth 2 (two) times daily as " needed.      sodium chloride 0.9% SolP 50 mL with DAPTOmycin 500 mg SolR 500 mg Inject 500 mg into the vein every 48 hours. Until 6/29/22      [DISCONTINUED] atorvastatin (LIPITOR) 80 MG tablet Take 1 tablet (80 mg total) by mouth once daily. (Patient taking differently: Take 80 mg by mouth every evening.) 90 tablet 3    [DISCONTINUED] blood-glucose meter (ACCU-CHEK KAYLIE PLUS METER) Misc To use to check blood sugars 4 times a day 1 each 0    [DISCONTINUED] clorazepate (TRANXENE) 3.75 MG Tab Take 7.5 mg by mouth nightly as needed.       [DISCONTINUED] dextrose (GLUCOSE GEL) 40 % gel Take 37.5 mLs (15,000 mg total) by mouth once as needed (hypoglycemia). 37.5 g 4    [DISCONTINUED] ezetimibe (ZETIA) 10 mg tablet Take 1 tablet (10 mg total) by mouth once daily.      [DISCONTINUED] fenofibrate 160 MG Tab Take 1 tablet (160 mg total) by mouth once daily. 90 tablet 3    [DISCONTINUED] lancing device with lancets (ACCU-CHEK SOFT DEV LANCETS) Kit To check blood sugars 4 times per day 400 each 3    [DISCONTINUED] pantoprazole (PROTONIX) 40 MG tablet Take 1 tablet (40 mg total) by mouth once daily.         Review of Systems:  Neg    Physical Exam:  Constitutional: nad, aao x 3  Heart: rrr, no m/r/g, wwp, no worsening edema, with wound vac  Lungs: ctab, no w/r/r/c, no lb  Abdomen: s/nt/nd, +BS    Results:  Recent Labs   Lab 06/26/22  0710 06/26/22  1459 06/27/22  0625 06/28/22  0510   *  --  131* 136   K 5.3* 5.5* 5.9* 4.8   CL 98  --  96 97   CO2 24  --  21* 30*   BUN 64*  --  82* 51*   CREATININE 7.7*  --  9.3* 6.8*   ESTGFRAFRICA 6.2*  --  4.9* 7.2*   EGFRNONAA 5.4*  --  4.3* 6.2*   GLU 89  --  97 122*       Recent Labs   Lab 06/21/22  1143 06/22/22  0618 06/25/22  0440 06/26/22  0710 06/27/22  0625 06/28/22  0510   CALCIUM 10.0   < > 9.1 9.5 9.6 10.0   ALBUMIN 3.4*  --   --   --   --   --    MG 2.5   < > 2.5  --  3.0* 2.8*    < > = values in this interval not displayed.       Recent Labs   Lab 01/30/20  0705  03/10/22  0650   PTH, Intact 466.0 H 387.0 H       No results for input(s): POCTGLUCOSE in the last 168 hours.    Recent Labs   Lab 05/05/22  0320 05/29/22  1324 06/22/22  0618   Hemoglobin A1C 10.6 H 8.3 H 7.5 H       Recent Labs   Lab 06/21/22  1143 06/22/22  0618 06/24/22  0601 06/26/22  0710 06/27/22  0625 06/28/22  0510   WBC 8.92 8.67   < > 9.67 9.90 9.30   HGB 10.7* 8.6*   < > 8.8* 8.6* 9.0*   HCT 36.2* 28.9*   < > 30.0* 30.5* 30.9*    350   < > 338 326 359   MCV 87 87   < > 88 91 87   MCHC 29.6* 29.8*   < > 29.3* 28.2* 29.1*   MONO 9.6  0.9 10.0  0.9  --   --   --   --     < > = values in this interval not displayed.       Recent Labs   Lab 06/21/22  1143   BILITOT 0.4   PROT 8.1   ALBUMIN 3.4*   ALKPHOS 67   ALT 11   AST 15       Recent Labs   Lab 05/29/22  0900 06/21/22  1612   Color, UA Yellow Yellow   Appearance, UA Clear Clear   pH, UA 8.0 >8.0 A   Specific [place], UA 1.015 1.010   Protein, UA 2+ A 3+ A   Glucose, UA 2+ A 2+ A   Ketones, UA Negative Negative   Urobilinogen, UA Negative Negative   Bilirubin (UA) Negative Negative   Occult Blood UA Negative Negative   Nitrite, UA Negative Negative   RBC, UA 1 1   WBC, UA 5 2   Bacteria Rare Negative   Hyaline Casts, UA 0 12 A       Recent Labs   Lab 05/19/21  0300   POC PH 7.273 L   POC PCO2 47.8 H   POC HCO3 22.1 L   POC PO2 22 LL   POC SATURATED O2 30 L   POC BE -5   Sample VENOUS     I have spent >  minutes providing care for this patient for the above diagnoses. These services have included chart/data/imaging review, evaluation, exam, formulation of plan, , note preparation, and discussions with staff involved in this patient's care.    Bryce Craig MD  [facility] [id number] [place] [address]  [phone] (p)  [phone] (f)

## 2022-06-28 NOTE — PROGRESS NOTES
[facility] Medicine  Progress Note    Patient Name: Leanna García  MRN: [id number]  Patient Class: IP- Inpatient   Admission Date: 6/21/2022  Length of Stay: 7 days  Attending Physician: Kory Millard MD  Primary Care Provider: Setfano Lopez MD        Subjective:     Principal Problem:Diabetic ulcer of left midfoot        HPI:  Leanna García is a 56 y.o.  female who  has a past medical history of A-fib, Arthritis, Bronchitis, Cardiovascular event risk, ASCVD 10-year risk 6.7% (10/15/2016), Diabetes mellitus, Diabetes mellitus type II, Diabetic ulcer of left midfoot (5/5/2022), Disorder of kidney and ureter, Encounter for blood transfusion, ESRD (end stage renal disease) (5/18/2018), MANJINDER (generalized anxiety disorder) (10/25/2016), History of colon polyps (11/02/2016), Hyperlipidemia, Hypertension, and Stroke.. The patient presented to [facility] on 6/21/2022 with a primary complaint of Foot Injury (Pt sent by MD for evaluation of her left foot.)  Patient is currently undergoing treat at [facility] and followed up with podiatrist today.  Podiatry evaluated patient and referred patient to the ED on account of worsening diabetic foot ulcer.       Overview/Hospital Course:  6/22: Patient is doing well. Awaiting vascular consult. No concerns/issues overnight reported by the patient or the nursing staff.     6/23: Case management consulted for placement, still awaiting vascular surgeons eval. ID [email or web address] [email or web address]      6/24: Patient had  peripheral angiography and PTA of left popliteal artery today. WBC scan pending     6/25:  Patient states that she feels better today and slept well.  She had dialysis done yesterday.  H&H stable, currently awaiting WBC scan     6/26:  Patient is doing well, WBC scan shows findings indicative of active soft tissue inflammatory response changes due to deep penetrating abscess and less conspicuous for underlying osteomyelitis.   Potassium elevated which is probably secondary to ESRD, would treat with insulin and D50 as patient is not scheduled for dialysis until tomorrow.  No acute overnight event.    6/27: potassuim elevated today which would be corrected with dialysis. Pending placement.         Interval History:  Feeling well this morning.  Bedside debridement to foot wounds done by Dr. Bruno this morning.  Wound VAC in place.  Plans to continue antibiotics and wound care at LTAC.  Pending authorization.  No fevers or chills, nausea, vomiting    Review of Systems   All other systems reviewed and are negative.  Objective:     Vital Signs (Most Recent):  Temp: 98.1 °F (36.7 °C) (06/28/22 0821)  Pulse: 60 (06/28/22 0821)  Resp: 18 (06/28/22 0821)  BP: 138/73 (06/28/22 0821)  SpO2: 96 % (06/28/22 0821) Vital Signs (24h Range):  Temp:  [97.9 °F (36.6 °C)-98.9 °F (37.2 °C)] 98.1 °F (36.7 °C)  Pulse:  [57-72] 60  Resp:  [18] 18  SpO2:  [92 %-96 %] 96 %  BP: ()/(47-77) 138/73     Weight: 89.1 kg (196 lb 6.9 oz)  Body mass index is 29.87 kg/m².    Intake/Output Summary (Last 24 hours) at 6/28/2022 1021  Last data filed at 6/27/2022 1345  Gross per 24 hour   Intake 500 ml   Output 3000 ml   Net -2500 ml      Physical Exam  Constitutional:       Appearance: Normal appearance. She is normal weight.   HENT:      Head: Normocephalic and atraumatic.      Nose: Nose normal.      Mouth/Throat:      Mouth: Mucous membranes are moist.   Eyes:      Conjunctiva/sclera: Conjunctivae normal.      Pupils: Pupils are equal, round, and reactive to light.   Cardiovascular:      Rate and Rhythm: Normal rate and regular rhythm.      Pulses: Normal pulses.      Heart sounds: Normal heart sounds. No murmur heard.    No friction rub. No gallop.   Pulmonary:      Effort: Pulmonary effort is normal.      Breath sounds: Normal breath sounds. No wheezing or rales.   Abdominal:      General: Abdomen is flat. Bowel sounds are normal. There is no distension.       Palpations: Abdomen is soft.      Tenderness: There is no abdominal tenderness. There is no guarding.   Musculoskeletal:         General: Tenderness present. No swelling. Normal range of motion.      Cervical back: Normal range of motion and neck supple.   Skin:     General: Skin is warm and dry.      Comments: Bilateral lower extremities with bandages in place.  Wound VAC in place to left foot with good seal and functioning properly.   Neurological:      General: No focal deficit present.      Mental Status: She is alert.   Psychiatric:         Mood and Affect: Mood normal.         Thought Content: Thought content normal.         Judgment: Judgment normal.       Significant Labs: All pertinent labs within the past 24 hours have been reviewed.    Significant Imaging: I have reviewed all pertinent imaging results/findings within the past 24 hours.      Assessment/Plan:      * Diabetic ulcer of left midfoot, and right foot  Morphine 2mg q.4h p.r.n. for pain  Continue IV ABx  Appreciate infectious disease recommendations  Podiatry, Wound Care, ID, Vascular surg on board  Wound care and cultures > growing Acinetobacter and Staph  Wound debrided and wound VAC in place  Pending transfer to [facility] for ongoing care  Question of length of antibiotic therapy and possible regimen that could be dosed with dialysis      PAD (peripheral artery disease)  Angiography done 6/24/22  Atorvastatin 40 mg daily  Aspirin 81 mg daily  Plavix 75 mg daily      Chronic anemia  Continue EPO      History of TIA (transient ischemic attack)    Could continue aspirin, Plavix, statin    Type 2 diabetes mellitus with kidney complication, with long-term current use of insulin  Continue Levemir 15 units nightly  Insulin sliding scale  Regular accuchek      ESRD (end stage renal disease)  Continue dialysis MWF  Continue EPO    Hypertension associated with diabetes  Continue home medication of hydralazine 50mg BID, losartan 100mg, amlodipine 10mg daily  and titrate upwards as needed      Hyperlipidemia associated with type 2 diabetes mellitus  Continue statin        VTE Risk Mitigation (From admission, onward)         Ordered     enoxaparin injection 30 mg  Daily         06/21/22 1659     IP VTE HIGH RISK PATIENT  Once         06/21/22 1659     Place sequential compression device  Until discontinued         06/21/22 1659                Discharge Planning   ERI: 6/30/2022     Code Status: Full Code   Is the patient medically ready for discharge?:     Reason for patient still in hospital (select all that apply): Treatment  Discharge Plan A: Long-term acute care facility (LTAC)   Discharge Delays: (!) Post-Acute Set-up              Kory Millard MD  Department of Hospital Medicine   Counts include 234 beds at the [facility]

## 2022-06-28 NOTE — SUBJECTIVE & OBJECTIVE
Subjective:     Interval History: patient resting in bed. Wound vac in place in left foot.  Wound care nurse contacted me yesterday regarding worsening of right foot wounds.    Patient is awaiting transfer to LTAC.        Follow-up For: Procedure(s) (LRB):  Angiogram, Abdominal Aorta W/ Extremity Runoff (N/A)  PTA, Superficial Femoral Artery    Post-Operative Day: 4 Days Post-Op    Scheduled Meds:   amLODIPine  10 mg Oral Daily    ampicillin-sulbactim (UNASYN) IVPB  3 g Intravenous Q12H    aspirin  81 mg Oral Daily    atorvastatin  80 mg Oral QHS    azelastine  1 spray Nasal BID    calcium acetate(phosphat bind)  667 mg Oral TID WM    cetirizine  10 mg Oral Every other day    clopidogreL  75 mg Oral Daily    collagenase   Topical (Top) Daily    DAPTOmycin (CUBICIN)  IV  500 mg Intravenous Q48H    enoxaparin  30 mg Subcutaneous Daily    epoetin chris-ebpx (RETACRIT) injection  50 Units/kg Intravenous Every Mon, Wed, Fri    fluticasone propionate  2 spray Each Nostril Daily    gabapentin  100 mg Oral BID    hydrALAZINE  50 mg Oral Q12H    insulin detemir U-100  15 Units Subcutaneous QHS    lactulose  20 g Oral BID    losartan  100 mg Oral Daily    oxybutynin  5 mg Oral TID     Continuous Infusions:  PRN Meds:acetaminophen, dextrose 50%, dextrose 50%, glucagon (human recombinant), glucose, glucose, hydrALAZINE, ibuprofen, insulin aspart U-100, morphine, ondansetron, ondansetron, sodium chloride 0.9%    Review of Systems   Constitutional:  Negative for chills, fatigue, fever and unexpected weight change.   HENT:  Negative for hearing loss and trouble swallowing.    Eyes:  Negative for photophobia and visual disturbance.   Respiratory:  Negative for cough, shortness of breath and wheezing.    Cardiovascular:  Negative for chest pain, palpitations and leg swelling.   Gastrointestinal:  Negative for abdominal pain and nausea.   Genitourinary:  Negative for dysuria and frequency.   Musculoskeletal:  Negative for  arthralgias, back pain, gait problem, joint swelling and myalgias.   Skin:  Positive for wound. Negative for rash.   Neurological:  Negative for tremors, seizures, weakness, numbness and headaches.   Hematological:  Does not bruise/bleed easily.   Objective:     Vital Signs (Most Recent):  Temp: 97.9 °F (36.6 °C) (06/28/22 0434)  Pulse: (!) 58 (06/28/22 0434)  Resp: 18 (06/28/22 0434)  BP: 122/67 (06/28/22 0434)  SpO2: (!) 94 % (06/28/22 0434)   Vital Signs (24h Range):  Temp:  [97.9 °F (36.6 °C)-98.9 °F (37.2 °C)] 97.9 °F (36.6 °C)  Pulse:  [57-72] 58  Resp:  [18] 18  SpO2:  [92 %-95 %] 94 %  BP: ()/(47-77) 122/67     Weight: 89.1 kg (196 lb 6.9 oz)  Body mass index is 29.87 kg/m².    Foot Exam              Laboratory:  All pertinent labs reviewed within the last 24 hours.    Diagnostic Results:  I have reviewed all pertinent imaging results/findings within the past 24 hours.

## 2022-06-29 LAB
ACID FAST MOD KINY STN SPEC: NORMAL
ANION GAP SERPL CALC-SCNC: 16 MMOL/L (ref 8–16)
BUN SERPL-MCNC: 73 MG/DL (ref 6–20)
CALCIUM SERPL-MCNC: 10.2 MG/DL (ref 8.7–10.5)
CHLORIDE SERPL-SCNC: 98 MMOL/L (ref 95–110)
CO2 SERPL-SCNC: 22 MMOL/L (ref 23–29)
CREAT SERPL-MCNC: 9.1 MG/DL (ref 0.5–1.4)
ERYTHROCYTE [DISTWIDTH] IN BLOOD BY AUTOMATED COUNT: 16.9 % (ref 11.5–14.5)
EST. GFR  (AFRICAN AMERICAN): 5 ML/MIN/1.73 M^2
EST. GFR  (NON AFRICAN AMERICAN): 4.4 ML/MIN/1.73 M^2
GLUCOSE SERPL-MCNC: 116 MG/DL (ref 70–110)
GLUCOSE SERPL-MCNC: 132 MG/DL (ref 70–110)
GLUCOSE SERPL-MCNC: 136 MG/DL (ref 70–110)
GLUCOSE SERPL-MCNC: 171 MG/DL (ref 70–110)
HCT VFR BLD AUTO: 28.9 % (ref 37–48.5)
HGB BLD-MCNC: 8.8 G/DL (ref 12–16)
MCH RBC QN AUTO: 26 PG (ref 27–31)
MCHC RBC AUTO-ENTMCNC: 30.4 G/DL (ref 32–36)
MCV RBC AUTO: 86 FL (ref 82–98)
MYCOBACTERIUM SPEC QL CULT: NORMAL
PLATELET # BLD AUTO: 347 K/UL (ref 150–450)
PMV BLD AUTO: 9.6 FL (ref 9.2–12.9)
POTASSIUM SERPL-SCNC: 5.3 MMOL/L (ref 3.5–5.1)
RBC # BLD AUTO: 3.38 M/UL (ref 4–5.4)
SODIUM SERPL-SCNC: 136 MMOL/L (ref 136–145)
WBC # BLD AUTO: 9.13 K/UL (ref 3.9–12.7)

## 2022-06-29 PROCEDURE — 99231 SBSQ HOSP IP/OBS SF/LOW 25: CPT | Mod: ,,, | Performed by: INTERNAL MEDICINE

## 2022-06-29 PROCEDURE — 63600175 PHARM REV CODE 636 W HCPCS: Mod: JG | Performed by: SURGERY

## 2022-06-29 PROCEDURE — 85027 COMPLETE CBC AUTOMATED: CPT | Performed by: STUDENT IN AN ORGANIZED HEALTH CARE EDUCATION/TRAINING PROGRAM

## 2022-06-29 PROCEDURE — 25000003 PHARM REV CODE 250: Performed by: SURGERY

## 2022-06-29 PROCEDURE — 99232 PR SUBSEQUENT HOSPITAL CARE,LEVL II: ICD-10-PCS | Mod: ,,, | Performed by: PODIATRIST

## 2022-06-29 PROCEDURE — 12000002 HC ACUTE/MED SURGE SEMI-PRIVATE ROOM

## 2022-06-29 PROCEDURE — 99231 PR SUBSEQUENT HOSPITAL CARE,LEVL I: ICD-10-PCS | Mod: ,,, | Performed by: INTERNAL MEDICINE

## 2022-06-29 PROCEDURE — 63600175 PHARM REV CODE 636 W HCPCS: Performed by: STUDENT IN AN ORGANIZED HEALTH CARE EDUCATION/TRAINING PROGRAM

## 2022-06-29 PROCEDURE — 90935 HEMODIALYSIS ONE EVALUATION: CPT

## 2022-06-29 PROCEDURE — 99232 SBSQ HOSP IP/OBS MODERATE 35: CPT | Mod: ,,, | Performed by: PODIATRIST

## 2022-06-29 PROCEDURE — 63600175 PHARM REV CODE 636 W HCPCS: Performed by: SURGERY

## 2022-06-29 PROCEDURE — 80048 BASIC METABOLIC PNL TOTAL CA: CPT | Performed by: STUDENT IN AN ORGANIZED HEALTH CARE EDUCATION/TRAINING PROGRAM

## 2022-06-29 PROCEDURE — 36415 COLL VENOUS BLD VENIPUNCTURE: CPT | Performed by: STUDENT IN AN ORGANIZED HEALTH CARE EDUCATION/TRAINING PROGRAM

## 2022-06-29 PROCEDURE — 25000003 PHARM REV CODE 250: Performed by: STUDENT IN AN ORGANIZED HEALTH CARE EDUCATION/TRAINING PROGRAM

## 2022-06-29 RX ADMIN — AZELASTINE HYDROCHLORIDE 137 MCG: 137 SPRAY, METERED NASAL at 09:06

## 2022-06-29 RX ADMIN — ASPIRIN 81 MG: 81 TABLET, COATED ORAL at 10:06

## 2022-06-29 RX ADMIN — HYDRALAZINE HYDROCHLORIDE 50 MG: 25 TABLET ORAL at 10:06

## 2022-06-29 RX ADMIN — AMLODIPINE BESYLATE 10 MG: 5 TABLET ORAL at 10:06

## 2022-06-29 RX ADMIN — CETIRIZINE HYDROCHLORIDE 10 MG: 10 TABLET, FILM COATED ORAL at 10:06

## 2022-06-29 RX ADMIN — INSULIN DETEMIR 15 UNITS: 100 INJECTION, SOLUTION SUBCUTANEOUS at 09:06

## 2022-06-29 RX ADMIN — COLLAGENASE SANTYL: 250 OINTMENT TOPICAL at 11:06

## 2022-06-29 RX ADMIN — CALCIUM ACETATE 667 MG: 667 CAPSULE ORAL at 05:06

## 2022-06-29 RX ADMIN — ENOXAPARIN SODIUM 30 MG: 30 INJECTION SUBCUTANEOUS at 05:06

## 2022-06-29 RX ADMIN — DAPTOMYCIN 500 MG: 500 INJECTION, POWDER, LYOPHILIZED, FOR SOLUTION INTRAVENOUS at 09:06

## 2022-06-29 RX ADMIN — GABAPENTIN 100 MG: 100 CAPSULE ORAL at 10:06

## 2022-06-29 RX ADMIN — CALCIUM ACETATE 667 MG: 667 CAPSULE ORAL at 07:06

## 2022-06-29 RX ADMIN — AZELASTINE HYDROCHLORIDE 137 MCG: 137 SPRAY, METERED NASAL at 10:06

## 2022-06-29 RX ADMIN — GABAPENTIN 100 MG: 100 CAPSULE ORAL at 09:06

## 2022-06-29 RX ADMIN — OXYBUTYNIN CHLORIDE 5 MG: 5 TABLET ORAL at 10:06

## 2022-06-29 RX ADMIN — OXYBUTYNIN CHLORIDE 5 MG: 5 TABLET ORAL at 05:06

## 2022-06-29 RX ADMIN — LACTULOSE 20 G: 20 SOLUTION ORAL at 09:06

## 2022-06-29 RX ADMIN — FLUTICASONE PROPIONATE 100 MCG: 50 SPRAY, METERED NASAL at 10:06

## 2022-06-29 RX ADMIN — CLOPIDOGREL BISULFATE 75 MG: 75 TABLET, FILM COATED ORAL at 10:06

## 2022-06-29 RX ADMIN — CALCIUM ACETATE 667 MG: 667 CAPSULE ORAL at 11:06

## 2022-06-29 RX ADMIN — AMPICILLIN SODIUM AND SULBACTAM SODIUM 3 G: 2; 1 INJECTION, POWDER, FOR SOLUTION INTRAMUSCULAR; INTRAVENOUS at 11:06

## 2022-06-29 RX ADMIN — MORPHINE SULFATE 2 MG: 2 INJECTION, SOLUTION INTRAMUSCULAR; INTRAVENOUS at 11:06

## 2022-06-29 RX ADMIN — HYDRALAZINE HYDROCHLORIDE 50 MG: 25 TABLET ORAL at 09:06

## 2022-06-29 RX ADMIN — EPOETIN ALFA-EPBX 4500 UNITS: 10000 INJECTION, SOLUTION INTRAVENOUS; SUBCUTANEOUS at 08:06

## 2022-06-29 RX ADMIN — ATORVASTATIN CALCIUM 80 MG: 40 TABLET, FILM COATED ORAL at 09:06

## 2022-06-29 RX ADMIN — LOSARTAN POTASSIUM 100 MG: 50 TABLET, FILM COATED ORAL at 10:06

## 2022-06-29 RX ADMIN — OXYBUTYNIN CHLORIDE 5 MG: 5 TABLET ORAL at 09:06

## 2022-06-29 NOTE — SUBJECTIVE & OBJECTIVE
Subjective:     Interval History:  Patient resting in bed.  Awaiting  LTAC transfer.  Pillow boots intact.  Denies complaints of pain to either extremity.    Follow-up For: Procedure(s) (LRB):  Angiogram, Abdominal Aorta W/ Extremity Runoff (N/A)  PTA, Superficial Femoral Artery    Post-Operative Day: 5 Days Post-Op    Scheduled Meds:   amLODIPine  10 mg Oral Daily    ampicillin-sulbactim (UNASYN) IVPB  3 g Intravenous Q12H    aspirin  81 mg Oral Daily    atorvastatin  80 mg Oral QHS    azelastine  1 spray Nasal BID    calcium acetate(phosphat bind)  667 mg Oral TID WM    cetirizine  10 mg Oral Every other day    clopidogreL  75 mg Oral Daily    collagenase   Topical (Top) Daily    DAPTOmycin (CUBICIN)  IV  500 mg Intravenous Q48H    enoxaparin  30 mg Subcutaneous Daily    epoetin chris-ebpx (RETACRIT) injection  50 Units/kg Intravenous Every Mon, Wed, Fri    fluticasone propionate  2 spray Each Nostril Daily    gabapentin  100 mg Oral BID    hydrALAZINE  50 mg Oral Q12H    insulin detemir U-100  15 Units Subcutaneous QHS    lactulose  20 g Oral BID    losartan  100 mg Oral Daily    oxybutynin  5 mg Oral TID     Continuous Infusions:  PRN Meds:acetaminophen, dextrose 50%, dextrose 50%, glucagon (human recombinant), glucose, glucose, hydrALAZINE, ibuprofen, insulin aspart U-100, morphine, ondansetron, ondansetron, sodium chloride 0.9%    Review of Systems  Objective:     Vital Signs (Most Recent):  Temp: 98.4 °F (36.9 °C) (06/29/22 0720)  Pulse: (!) 54 (06/29/22 0800)  Resp: 18 (06/29/22 0720)  BP: (!) 93/49 (06/29/22 0800)  SpO2: 95 % (06/29/22 0720)   Vital Signs (24h Range):  Temp:  [97.5 °F (36.4 °C)-98.5 °F (36.9 °C)] 98.4 °F (36.9 °C)  Pulse:  [54-80] 54  Resp:  [16-18] 18  SpO2:  [95 %-98 %] 95 %  BP: ()/(49-89) 93/49     Weight: 89.1 kg (196 lb 6.9 oz)  Body mass index is 29.87 kg/m².    Foot Exam                          Laboratory:  All pertinent labs reviewed within the last 24 hours.    Diagnostic  Results:  I have reviewed all pertinent imaging results/findings within the past 24 hours.

## 2022-06-29 NOTE — PROGRESS NOTES
[facility] [place]  Podiatry  Progress Note    Patient Name: Leanna García  MRN: [id number]  Admission Date: 6/21/2022  Hospital Length of Stay: 8 days  Attending Physician: Kory Millard MD  Primary Care Provider: Stefano Lopez MD     Subjective:     Interval History:  Patient resting in bed.  Awaiting  LTAC transfer.  Pillow boots intact.  Denies complaints of pain to either extremity.    Follow-up For: Procedure(s) (LRB):  Angiogram, Abdominal Aorta W/ Extremity Runoff (N/A)  PTA, Superficial Femoral Artery    Post-Operative Day: 5 Days Post-Op    Scheduled Meds:   amLODIPine  10 mg Oral Daily    ampicillin-sulbactim (UNASYN) IVPB  3 g Intravenous Q12H    aspirin  81 mg Oral Daily    atorvastatin  80 mg Oral QHS    azelastine  1 spray Nasal BID    calcium acetate(phosphat bind)  667 mg Oral TID WM    cetirizine  10 mg Oral Every other day    clopidogreL  75 mg Oral Daily    collagenase   Topical (Top) Daily    DAPTOmycin (CUBICIN)  IV  500 mg Intravenous Q48H    enoxaparin  30 mg Subcutaneous Daily    epoetin chris-ebpx (RETACRIT) injection  50 Units/kg Intravenous Every Mon, Wed, Fri    fluticasone propionate  2 spray Each Nostril Daily    gabapentin  100 mg Oral BID    hydrALAZINE  50 mg Oral Q12H    insulin detemir U-100  15 Units Subcutaneous QHS    lactulose  20 g Oral BID    losartan  100 mg Oral Daily    oxybutynin  5 mg Oral TID     Continuous Infusions:  PRN Meds:acetaminophen, dextrose 50%, dextrose 50%, glucagon (human recombinant), glucose, glucose, hydrALAZINE, ibuprofen, insulin aspart U-100, morphine, ondansetron, ondansetron, sodium chloride 0.9%    Review of Systems  Objective:     Vital Signs (Most Recent):  Temp: 98.4 °F (36.9 °C) (06/29/22 0720)  Pulse: (!) 54 (06/29/22 0800)  Resp: 18 (06/29/22 0720)  BP: (!) 93/49 (06/29/22 0800)  SpO2: 95 % (06/29/22 0720)   Vital Signs (24h Range):  Temp:  [97.5 °F (36.4 °C)-98.5 °F (36.9 °C)] 98.4 °F (36.9 °C)  Pulse:   [54-80] 54  Resp:  [16-18] 18  SpO2:  [95 %-98 %] 95 %  BP: ()/(49-89) 93/49     Weight: 89.1 kg (196 lb 6.9 oz)  Body mass index is 29.87 kg/m².    Foot Exam                          Laboratory:  All pertinent labs reviewed within the last 24 hours.    Diagnostic Results:  I have reviewed all pertinent imaging results/findings within the past 24 hours.    Assessment/Plan:     * Diabetic ulcer of left midfoot, and right foot  Patent anterior tibial artery.  Vascular intervention successfully opened this artery.  no patent posterior tibial artery    Continue would vac on left foot- wound VAC is bridged to all wounds of patient's plantar foot    Recommend Xeroform followed by bulky wrap with ABD and extra foams to heel wound and great toe wound    Float heels at all times    Pillow boots as well    vicky and glucerna daily- a1c is much improved!      LTAC following discharge for aggressive wound care with debridements, with wound VAC, and IV antibiotics.  It is my professional opinion that if patient does not get accepted to an LTAC, she is at an extremely high risk of limb loss    Patient needs to be nonweightbearing to left foot          Alivia Bruno DPM  Podiatry  [facility]

## 2022-06-29 NOTE — CARE UPDATE
Peer to peer with Dr Millard and [employer] medical director completed. Auth remains in pending status will follow up in am.

## 2022-06-29 NOTE — PROGRESS NOTES
[facility] Medicine  Progress Note    Patient Name: Leanna García  MRN: [id number]  Patient Class: IP- Inpatient   Admission Date: 6/21/2022  Length of Stay: 8 days  Attending Physician: Kory Millard MD  Primary Care Provider: Stefano Lopez MD        Subjective:     Principal Problem:Diabetic ulcer of left midfoot        HPI:  Leanna García is a 56 y.o.  female who  has a past medical history of A-fib, Arthritis, Bronchitis, Cardiovascular event risk, ASCVD 10-year risk 6.7% (10/15/2016), Diabetes mellitus, Diabetes mellitus type II, Diabetic ulcer of left midfoot (5/5/2022), Disorder of kidney and ureter, Encounter for blood transfusion, ESRD (end stage renal disease) (5/18/2018), MANJINDER (generalized anxiety disorder) (10/25/2016), History of colon polyps (11/02/2016), Hyperlipidemia, Hypertension, and Stroke.. The patient presented to [facility] on 6/21/2022 with a primary complaint of Foot Injury (Pt sent by MD for evaluation of her left foot.)  Patient is currently undergoing treat at [facility] and followed up with podiatrist today.  Podiatry evaluated patient and referred patient to the ED on account of worsening diabetic foot ulcer.       Overview/Hospital Course:  6/22: Patient is doing well. Awaiting vascular consult. No concerns/issues overnight reported by the patient or the nursing staff.     6/23: Case management consulted for placement, still awaiting vascular surgeons eval. ID aaded Unasyn      6/24: Patient had  peripheral angiography and PTA of left popliteal artery today. WBC scan pending     6/25:  Patient states that she feels better today and slept well.  She had dialysis done yesterday.  H&H stable, currently awaiting WBC scan     6/26:  Patient is doing well, WBC scan shows findings indicative of active soft tissue inflammatory response changes due to deep penetrating abscess and less conspicuous for underlying osteomyelitis.   Potassium elevated which is probably secondary to ESRD, would treat with insulin and D50 as patient is not scheduled for dialysis until tomorrow.  No acute overnight event.    6/27: potassuim elevated today which would be corrected with dialysis. Pending placement.         Interval History:  No acute events overnight.  Working on getting her to LTAC.  Discussed with Dr Haas and with Dr Bruno and both feel she would heal her wounds with the perfusion that she has with aggressive wound care and treatment of her infections. I discussed with Peer to Peer and they agree that we should move forward with LTAC.     Review of Systems   All other systems reviewed and are negative.  Objective:     Vital Signs (Most Recent):  Temp: 98 °F (36.7 °C) (06/29/22 1135)  Pulse: 60 (06/29/22 1135)  Resp: 17 (06/29/22 1135)  BP: 133/71 (06/29/22 1135)  SpO2: (!) 94 % (06/29/22 1135) Vital Signs (24h Range):  Temp:  [98 °F (36.7 °C)-98.5 °F (36.9 °C)] 98 °F (36.7 °C)  Pulse:  [52-80] 60  Resp:  [16-18] 17  SpO2:  [94 %-95 %] 94 %  BP: ()/(45-89) 133/71     Weight: 89.1 kg (196 lb 6.9 oz)  Body mass index is 29.87 kg/m².    Intake/Output Summary (Last 24 hours) at 6/29/2022 1455  Last data filed at 6/29/2022 1035  Gross per 24 hour   Intake 780 ml   Output 1500 ml   Net -720 ml        Physical Exam  Constitutional:       Appearance: Normal appearance. She is normal weight.   HENT:      Head: Normocephalic and atraumatic.      Nose: Nose normal.      Mouth/Throat:      Mouth: Mucous membranes are moist.   Eyes:      Conjunctiva/sclera: Conjunctivae normal.      Pupils: Pupils are equal, round, and reactive to light.   Cardiovascular:      Rate and Rhythm: Normal rate and regular rhythm.      Pulses: Normal pulses.      Heart sounds: Normal heart sounds. No murmur heard.    No friction rub. No gallop.   Pulmonary:      Effort: Pulmonary effort is normal.      Breath sounds: Normal breath sounds. No wheezing or rales.   Abdominal:       General: Abdomen is flat. Bowel sounds are normal. There is no distension.      Palpations: Abdomen is soft.      Tenderness: There is no abdominal tenderness. There is no guarding.   Musculoskeletal:         General: Tenderness present. No swelling. Normal range of motion.      Cervical back: Normal range of motion and neck supple.   Skin:     General: Skin is warm and dry.      Comments: Bilateral lower extremities with bandages in place.  Wound VAC in place to left foot with good seal and functioning properly.   Neurological:      General: No focal deficit present.      Mental Status: She is alert.   Psychiatric:         Mood and Affect: Mood normal.         Thought Content: Thought content normal.         Judgment: Judgment normal.       Significant Labs: All pertinent labs within the past 24 hours have been reviewed.    Significant Imaging: I have reviewed all pertinent imaging results/findings within the past 24 hours.      Assessment/Plan:      * Diabetic ulcer of left midfoot, and right foot  Morphine 2mg q.4h p.r.n. for pain  Continue IV ABx  Appreciate infectious disease recommendations  Podiatry, Wound Care, ID, Vascular surg on board  Wound care and cultures > growing Acinetobacter and Staph  Wound debrided and wound VAC in place  Pending transfer to LTAC for ongoing care pending auth from insurer  Dr. Haas and Dr Bruno both agree that both limbs are salvageable with good wound care and LTAC therapy.   Question of length of antibiotic therapy and possible regimen that could be dosed with dialysis      PAD (peripheral artery disease)  Angiography done 6/24/22  Atorvastatin 40 mg daily  Aspirin 81 mg daily  Plavix 75 mg daily      Chronic anemia  Continue EPO      History of TIA (transient ischemic attack)    Could continue aspirin, Plavix, statin    Type 2 diabetes mellitus with kidney complication, with long-term current use of insulin  Continue Levemir 15 units nightly  Insulin sliding  scale  Regular accuchek      ESRD (end stage renal disease)  Continue dialysis MWF  Continue EPO    Hypertension associated with diabetes  Continue home medication of hydralazine 50mg BID, losartan 100mg, amlodipine 10mg daily and titrate upwards as needed      Hyperlipidemia associated with type 2 diabetes mellitus  Continue statin        VTE Risk Mitigation (From admission, onward)         Ordered     enoxaparin injection 30 mg  Daily         06/21/22 1659     IP VTE HIGH RISK PATIENT  Once         06/21/22 1659     Place sequential compression device  Until discontinued         06/21/22 1659                Discharge Planning   ERI: 6/30/2022     Code Status: Full Code   Is the patient medically ready for discharge?:     Reason for patient still in hospital (select all that apply): Treatment  Discharge Plan A: Long-term acute care facility (LTAC)   Discharge Delays: (!) Post-Acute Set-up              Kory Millard MD  Department of Hospital Medicine   [facility]

## 2022-06-29 NOTE — ASSESSMENT & PLAN NOTE
Patent anterior tibial artery.  Vascular intervention successfully opened this artery.  no patent posterior tibial artery    Continue would vac on left foot- wound VAC is bridged to all wounds of patient's plantar foot    Recommend Xeroform followed by bulky wrap with ABD and extra foams to heel wound and great toe wound    Float heels at all times    Pillow boots as well    vicky and glucerna daily- a1c is much improved!      LTAC following discharge for aggressive wound care with debridements, with wound VAC, and IV antibiotics.  It is my professional opinion that if patient does not get accepted to an LTAC, she is at an extremely high risk of limb loss    Patient needs to be nonweightbearing to left foot

## 2022-06-29 NOTE — PROGRESS NOTES
6/21/22  Chart reviewed, cultures and [facility] ID notes, podiatry notes and available photos.   unasyn added to cover acinetobacter  Will see in person 6/22 6/22/22  Consult Note  Infectious Disease    Reason for Consult:  Diabetic foot infection, PAD    HPI: Leanna García is a 56 y.o. female known to me from long hospitalization in May at which time she was treated for bacillus serious bacteremia from a food-borne infection and for MRSA infection of the left medial foot which required several debridements and prolonged IV antibiotics.  MRIs were negative for osteomyelitis and she was transferred to [facility] where she was until 6/14.  One 6/7 she saw Dr. Bruno in the wound clinic and a culture was obtained growing Acinetobacter.  MRI on 06/08 of the forefoot was negative.  Her antibiotics were adjusted by ID at the [facility] and the at the time of her transfer from [facility] to the nursing home she was on daptomycin and oral Cipro.  She was seen in Wound Care Clinic yesterday and felt to have not had adequate improvement in was referred to the hospital for admission.  Amputation was recommended but declined by the patient.  She has a history of poorly controlled diabetes and her A1c which was greater than 14% earlier this year was most recently 8% in May.  She does not currently have a fever or leukocytosis in compared to my last examination of her foot the medial foot wound is smaller and shallower but the left great toe is erythematous and bruised appearing.  She suffered deep tissue injury to the left heel while hospitalized here in May and now has a hard eschar over the left heel.    6/23: interim reviewed. Vascular consult pending, CRP lower.  wanted to clarify that they did not refuse an amputation, and have come to terms if she absolutely needs it, but want to pursue as many options possible to avoid this. The great toe may require amputation however and they understand this.   6/24:  Interim reviewed.   Afebrile.  Status post peripheral angiography and PTA of left popliteal artery.  Patient  are hopeful that this will foot improve.  Discussed much of her vascular disease is small vessel related.  6/25: interim reviewed. No new issues.  updated. Anticipating LTAC. Watching for improvement or progression of foot and great toe lesions.   6/27: interim reviewed. D/w Dr. Bruno yesterday who is more hopeful for left foot since PTA. Right heel eschar seems smaller. a1c down to 7.5%.  She complains of left heel pain and discomfort from friction but she really isn't moving around and not even getting up in the chair.  Appetite is good, no diarrhea  6/29: interim reviewed. She remains in the bed all the time. Foot wounds are stable. No antibiotic side effects.     EXAM & DIAGNOSTICS REVIEWED:   Vitals:     Temp:  [98 °F (36.7 °C)-98.5 °F (36.9 °C)]   Temp: 98 °F (36.7 °C) (06/29/22 1135)  Pulse: 60 (06/29/22 1135)  Resp: 17 (06/29/22 1135)  BP: 133/71 (06/29/22 1135)  SpO2: (!) 94 % (06/29/22 1135)    Intake/Output Summary (Last 24 hours) at 6/29/2022 1442  Last data filed at 6/29/2022 1035  Gross per 24 hour   Intake 780 ml   Output 1500 ml   Net -720 ml       General:  In NAD. Alert and attentive, cooperative, comfortable. Has less facial and scleral puffiness  Eyes:  Anicteric,   EOMI.    ENT:  No ulcers, exudates, thrush, nares patent,    Neck:  Supple,   Lungs:    Heart:     Abd:     :  Voids   Musc:  Joints without effusion, swelling, erythema, synovitis,  .   Skin:  No rashes.   Neuro:             Alert, attentive, speech fluent, face symmetric, moves all extremities, no focal weakness.    Psych:  Calm, cooperative  Lymphatic:      Extrem: No pitting edema,   Warm. See photos below  VAD:        Isolation:  contact  Wound:   The left  Foot medial wound is smaller and shallower. The left great toe is erythematous, peeling, ?bruised  There is a dense black eschar left heel    6/23: the great toe ulcer is  full thickness, part necrotic slough, with cellulitis is a little better.   6/25: the great toe ulcer is unchanged.   6/26photos            6/29 6/29: great toe wound is stable. Medial foot wound covered by wound vac         General Labs reviewed:  Recent Labs   Lab 06/27/22  0625 06/28/22  0510 06/29/22  0504   WBC 9.90 9.30 9.13   HGB 8.6* 9.0* 8.8*   HCT 30.5* 30.9* 28.9*    359 347       Recent Labs   Lab 06/27/22  0625 06/28/22  0510 06/29/22  0504   * 136 136   K 5.9* 4.8 5.3*   CL 96 97 98   CO2 21* 30* 22*   BUN 82* 51* 73*   CREATININE 9.3* 6.8* 9.1*   CALCIUM 9.6 10.0 10.2     Recent Labs   Lab 06/24/22  0601 06/25/22  0440 06/26/22  0710   CRP 5.52* 5.34* 5.23*         Micro:  Microbiology Results (last 7 days)     Procedure Component Value Units Date/Time    Blood culture #2 **CANNOT BE ORDERED STAT** [[id number]] Collected: 06/21/22 1155    Order Status: Completed Specimen: Blood from Peripheral, Antecubital, Right Updated: 06/26/22 1232     Blood Culture, Routine No growth after 5 days.    Blood culture #1 **CANNOT BE ORDERED STAT** [[id number]] Collected: 06/21/22 1143    Order Status: Completed Specimen: Blood from Peripheral, Wrist, Right Updated: 06/26/22 1232     Blood Culture, Routine No growth after 5 days.          Imaging Reviewed:  5/6/22 arterial US  1. No arterial occlusion or hemodynamically significant stenosis identified.  2. Diffuse bilateral lower extremity peripheral arterial disease.    MRI left forefoot 6/8 negative    Foot xray    Nuclear white blood cell scan 6/25  IMPRESSION: Localized uptake within the undersurface of the left foot attributed towards the patient's underlying deep penetrating soft tissue ulcer less defined with an anatomical distribution of the osseous structures of posterior hindfoot. Findings are indicative of active soft tissue inflammatory response changes due to deep penetrating abscess and less conspicuous for underlying osteomyelitis. These  findings concur with the MR findings. Correlation with the patient's current clinical presentation is advised.    Cardiology:    IMPRESSION & PLAN   1. Chronic left foot wounds, s/p abscess with MRSA, and clinically not infected   Left great toe is erythematous, and left heel has eschar due to decubitus   Improved left great toe post PTA, per Dr. Bruno    2. Poorly controlled diabetes, improved  3. PAD, small vessel presumed  4. ESRD, may need more volume removed  5. Recent h/o Bacillus cereus bacteremia      Recommendations:  Continue daptomycin and unasyn  Awaiting placement at LTAC      Will need a midline again    Following peripherally    Medical Decision Making during this encounter was  [_] Low Complexity  [_] Moderate Complexity  [ xx ] High Complexity  D/w patient

## 2022-06-29 NOTE — SUBJECTIVE & OBJECTIVE
Interval History:  No acute events overnight.  Working on getting her to LTAC.  Discussed with Dr Haas and with Dr Bruno and both feel she would heal her wounds with the perfusion that she has with aggressive wound care and treatment of her infections. I discussed with Peer to Peer and they agree that we should move forward with LTAC.     Review of Systems   All other systems reviewed and are negative.  Objective:     Vital Signs (Most Recent):  Temp: 98 °F (36.7 °C) (06/29/22 1135)  Pulse: 60 (06/29/22 1135)  Resp: 17 (06/29/22 1135)  BP: 133/71 (06/29/22 1135)  SpO2: (!) 94 % (06/29/22 1135) Vital Signs (24h Range):  Temp:  [98 °F (36.7 °C)-98.5 °F (36.9 °C)] 98 °F (36.7 °C)  Pulse:  [52-80] 60  Resp:  [16-18] 17  SpO2:  [94 %-95 %] 94 %  BP: ()/(45-89) 133/71     Weight: 89.1 kg (196 lb 6.9 oz)  Body mass index is 29.87 kg/m².    Intake/Output Summary (Last 24 hours) at 6/29/2022 1455  Last data filed at 6/29/2022 1035  Gross per 24 hour   Intake 780 ml   Output 1500 ml   Net -720 ml        Physical Exam  Constitutional:       Appearance: Normal appearance. She is normal weight.   HENT:      Head: Normocephalic and atraumatic.      Nose: Nose normal.      Mouth/Throat:      Mouth: Mucous membranes are moist.   Eyes:      Conjunctiva/sclera: Conjunctivae normal.      Pupils: Pupils are equal, round, and reactive to light.   Cardiovascular:      Rate and Rhythm: Normal rate and regular rhythm.      Pulses: Normal pulses.      Heart sounds: Normal heart sounds. No murmur heard.    No friction rub. No gallop.   Pulmonary:      Effort: Pulmonary effort is normal.      Breath sounds: Normal breath sounds. No wheezing or rales.   Abdominal:      General: Abdomen is flat. Bowel sounds are normal. There is no distension.      Palpations: Abdomen is soft.      Tenderness: There is no abdominal tenderness. There is no guarding.   Musculoskeletal:         General: Tenderness present. No swelling. Normal range of  motion.      Cervical back: Normal range of motion and neck supple.   Skin:     General: Skin is warm and dry.      Comments: Bilateral lower extremities with bandages in place.  Wound VAC in place to left foot with good seal and functioning properly.   Neurological:      General: No focal deficit present.      Mental Status: She is alert.   Psychiatric:         Mood and Affect: Mood normal.         Thought Content: Thought content normal.         Judgment: Judgment normal.       Significant Labs: All pertinent labs within the past 24 hours have been reviewed.    Significant Imaging: I have reviewed all pertinent imaging results/findings within the past 24 hours.

## 2022-06-29 NOTE — ASSESSMENT & PLAN NOTE
Morphine 2mg q.4h p.r.n. for pain  Continue IV ABx  Appreciate infectious disease recommendations  Podiatry, Wound Care, ID, Vascular surg on board  Wound care and cultures > growing Acinetobacter and Staph  Wound debrided and wound VAC in place  Pending transfer to LTAC for ongoing care pending auth from insurer  Dr. Haas and Dr Bruno both agree that both limbs are salvageable with good wound care and LTAC therapy.   Question of length of antibiotic therapy and possible regimen that could be dosed with dialysis

## 2022-06-30 VITALS
RESPIRATION RATE: 17 BRPM | OXYGEN SATURATION: 99 % | SYSTOLIC BLOOD PRESSURE: 159 MMHG | DIASTOLIC BLOOD PRESSURE: 87 MMHG | BODY MASS INDEX: 29.77 KG/M2 | WEIGHT: 196.44 LBS | HEIGHT: 68 IN | HEART RATE: 66 BPM | TEMPERATURE: 98 F

## 2022-06-30 PROBLEM — D63.1 ANEMIA DUE TO END STAGE RENAL DISEASE: Status: ACTIVE | Noted: 2022-06-30

## 2022-06-30 PROBLEM — N18.6 ANEMIA DUE TO END STAGE RENAL DISEASE: Status: ACTIVE | Noted: 2022-06-30

## 2022-06-30 LAB
ANION GAP SERPL CALC-SCNC: 11 MMOL/L (ref 8–16)
BUN SERPL-MCNC: 49 MG/DL (ref 6–20)
CALCIUM SERPL-MCNC: 9.7 MG/DL (ref 8.7–10.5)
CHLORIDE SERPL-SCNC: 98 MMOL/L (ref 95–110)
CO2 SERPL-SCNC: 25 MMOL/L (ref 23–29)
CREAT SERPL-MCNC: 6.5 MG/DL (ref 0.5–1.4)
EST. GFR  (AFRICAN AMERICAN): 7.6 ML/MIN/1.73 M^2
EST. GFR  (NON AFRICAN AMERICAN): 6.6 ML/MIN/1.73 M^2
GLUCOSE SERPL-MCNC: 111 MG/DL (ref 70–110)
GLUCOSE SERPL-MCNC: 115 MG/DL (ref 70–110)
GLUCOSE SERPL-MCNC: 117 MG/DL (ref 70–110)
POTASSIUM SERPL-SCNC: 4.6 MMOL/L (ref 3.5–5.1)
SODIUM SERPL-SCNC: 134 MMOL/L (ref 136–145)

## 2022-06-30 PROCEDURE — 80048 BASIC METABOLIC PNL TOTAL CA: CPT | Performed by: STUDENT IN AN ORGANIZED HEALTH CARE EDUCATION/TRAINING PROGRAM

## 2022-06-30 PROCEDURE — 25000003 PHARM REV CODE 250: Performed by: SURGERY

## 2022-06-30 PROCEDURE — 63600175 PHARM REV CODE 636 W HCPCS: Performed by: SURGERY

## 2022-06-30 PROCEDURE — 36415 COLL VENOUS BLD VENIPUNCTURE: CPT | Performed by: STUDENT IN AN ORGANIZED HEALTH CARE EDUCATION/TRAINING PROGRAM

## 2022-06-30 RX ORDER — ATORVASTATIN CALCIUM 80 MG/1
80 TABLET, FILM COATED ORAL DAILY
Qty: 90 TABLET | Refills: 3
Start: 2022-06-30 | End: 2022-09-15 | Stop reason: SDUPTHER

## 2022-06-30 RX ORDER — CLOPIDOGREL BISULFATE 75 MG/1
75 TABLET ORAL DAILY
Qty: 30 TABLET | Refills: 11
Start: 2022-07-01 | End: 2022-08-12 | Stop reason: SDUPTHER

## 2022-06-30 RX ADMIN — GABAPENTIN 100 MG: 100 CAPSULE ORAL at 08:06

## 2022-06-30 RX ADMIN — ASPIRIN 81 MG: 81 TABLET, COATED ORAL at 08:06

## 2022-06-30 RX ADMIN — AMPICILLIN SODIUM AND SULBACTAM SODIUM 3 G: 2; 1 INJECTION, POWDER, FOR SOLUTION INTRAMUSCULAR; INTRAVENOUS at 05:06

## 2022-06-30 RX ADMIN — CALCIUM ACETATE 667 MG: 667 CAPSULE ORAL at 12:06

## 2022-06-30 RX ADMIN — CLOPIDOGREL BISULFATE 75 MG: 75 TABLET, FILM COATED ORAL at 08:06

## 2022-06-30 RX ADMIN — AMLODIPINE BESYLATE 10 MG: 5 TABLET ORAL at 08:06

## 2022-06-30 RX ADMIN — HYDRALAZINE HYDROCHLORIDE 50 MG: 25 TABLET ORAL at 08:06

## 2022-06-30 RX ADMIN — FLUTICASONE PROPIONATE 100 MCG: 50 SPRAY, METERED NASAL at 08:06

## 2022-06-30 RX ADMIN — AZELASTINE HYDROCHLORIDE 137 MCG: 137 SPRAY, METERED NASAL at 08:06

## 2022-06-30 RX ADMIN — OXYBUTYNIN CHLORIDE 5 MG: 5 TABLET ORAL at 08:06

## 2022-06-30 RX ADMIN — CALCIUM ACETATE 667 MG: 667 CAPSULE ORAL at 08:06

## 2022-06-30 RX ADMIN — LOSARTAN POTASSIUM 100 MG: 50 TABLET, FILM COATED ORAL at 08:06

## 2022-06-30 RX ADMIN — LACTULOSE 20 G: 20 SOLUTION ORAL at 08:06

## 2022-06-30 NOTE — PLAN OF CARE
06/30/22 1432   Final Note   Assessment Type Final Discharge Note   Anticipated Discharge Disposition LTAC   Post-Acute Status   Post-Acute Authorization Placement   Post-Acute Placement Status Set-up Complete/Auth obtained   Discharge Delays None known at this time   Patient accepted to LTAC [facility]   Number for report: [phone] Room 211. Nurse to received report: Damon Guillen is Accepting MD  Wheelchair van is setup for pickup at 330pm     Notified Nurse, Charge, MD.

## 2022-06-30 NOTE — PLAN OF CARE
PT COMPLIANT WITH POC . RR EVEN AND UNLABORED. WOUND VAC MOVED PER CHARGE NURSE INSTRUCTIONS(JUSTINO RN)  NO SIGNS OF DISTRESS NOTED. WILL CONTINUE TO MONITOR WHILE PT IN ROOM .       Problem: Adult Inpatient Plan of Care  Goal: Plan of Care Review  Outcome: Ongoing, Progressing  Goal: Patient-Specific Goal (Individualized)  Outcome: Ongoing, Progressing  Goal: Absence of Hospital-Acquired Illness or Injury  Outcome: Ongoing, Progressing  Goal: Optimal Comfort and Wellbeing  Outcome: Ongoing, Progressing  Goal: Readiness for Transition of Care  Outcome: Ongoing, Progressing     Problem: Diabetes Comorbidity  Goal: Blood Glucose Level Within Targeted Range  Outcome: Ongoing, Progressing     Problem: Infection  Goal: Absence of Infection Signs and Symptoms  Outcome: Ongoing, Progressing     Problem: Fall Injury Risk  Goal: Absence of Fall and Fall-Related Injury  Outcome: Ongoing, Progressing

## 2022-06-30 NOTE — PROGRESS NOTES
INPATIENT NEPHROLOGY Progress Note  [facility]    Patient Name: Leanna García  Date: 06/30/2022    Reason for consultation: ESRD    Chief Complaint:   Chief Complaint   Patient presents with    Foot Injury     Pt sent by MD for evaluation of her left foot.     History of Present Illness:  55 y/o F with ESRD on HD MWF, HTN, HLD, and DMII who was sent in by podiatry from [facility] for worsening ulceration and decreased blood flow to L foot with known diabetic wound despite IV antbx, recommending vascular eval and BKA, consulted for dialysis.    Interval History:  6/22- off floor  6/23- no issues with HD yest- got off 2L  6/24- gong for angiogram today with Dr. Haas  6/25- had PTA of the L popliteal artery, no issues with HD yest- got off 3L  6/26- working on placement  6/27 VSS, no new complains. Hd today. s/p debridement yesterday.  6/28 VSS, no new complains. HD in AM.  6/29 VSS. HD today. Appreciate Podiatry input. Can be dc to LTAC from renal stand point.  6/30 VSS, no new complains. HD in AM.    Plan of Care:    ESRD on HD MWF  Hyperkalemia  Hyponatremia  HTN  Secondary HPT  Anemia of CKD  Diabetic foot ulcer; PVD s/p PTA L popliteal artery    Plan:    - Continue HD MWF.   - Continue UF to dry weight.  - Continue renal diet, 1.5L fluid restriction.  - Continue FERMÍN with HD.  - Dose antbx for CrCl < 10/HD. Angiogram reviewed.  - if K remains elevated, will start Lokelma.    Thank you for allowing us to participate in this patient's care. We will continue to follow.    Vital Signs:  Temp Readings from Last 3 Encounters:   06/30/22 97.9 °F (36.6 °C) (Oral)   06/21/22 98.4 °F (36.9 °C)   06/14/22 97.9 °F (36.6 °C)       Pulse Readings from Last 3 Encounters:   06/30/22 66   06/21/22 76   06/14/22 73       BP Readings from Last 3 Encounters:   06/30/22 (!) 159/87   06/21/22 (!) 154/84   06/14/22 138/64       Weight:  Wt Readings from Last 3 Encounters:   06/21/22 89.1 kg (196 lb 6.9 oz)    06/13/22 89.9 kg (198 lb 3.1 oz)   06/02/22 86.2 kg (190 lb)       Medications:  Scheduled Meds:   amLODIPine  10 mg Oral Daily    ampicillin-sulbactim (UNASYN) IVPB  3 g Intravenous Q12H    aspirin  81 mg Oral Daily    atorvastatin  80 mg Oral QHS    azelastine  1 spray Nasal BID    calcium acetate(phosphat bind)  667 mg Oral TID WM    cetirizine  10 mg Oral Every other day    clopidogreL  75 mg Oral Daily    collagenase   Topical (Top) Daily    DAPTOmycin (CUBICIN)  IV  500 mg Intravenous Q48H    enoxaparin  30 mg Subcutaneous Daily    epoetin chris-ebpx (RETACRIT) injection  50 Units/kg Intravenous Every Mon, Wed, Fri    fluticasone propionate  2 spray Each Nostril Daily    gabapentin  100 mg Oral BID    hydrALAZINE  50 mg Oral Q12H    insulin detemir U-100  15 Units Subcutaneous QHS    lactulose  20 g Oral BID    losartan  100 mg Oral Daily    oxybutynin  5 mg Oral TID     Continuous Infusions:  PRN Meds:.acetaminophen, dextrose 50%, dextrose 50%, glucagon (human recombinant), glucose, glucose, hydrALAZINE, ibuprofen, insulin aspart U-100, morphine, ondansetron, ondansetron, sodium chloride 0.9%  No current facility-administered medications on file prior to encounter.     Current Outpatient Medications on File Prior to Encounter   Medication Sig Dispense Refill    amLODIPine (NORVASC) 10 MG tablet Take 1 tablet (10 mg total) by mouth once daily.      aspirin (ECOTRIN) 81 MG EC tablet Take 81 mg by mouth once daily.      azelastine (ASTELIN) 137 mcg (0.1 %) nasal spray 1 spray by Nasal route 2 (two) times a day.      blood sugar diagnostic Strp To check BG 4 times daily, to use with insurance preferred meter (Patient taking differently: 1 strip by Misc.(Non-Drug; Combo Route) route 4 (four) times daily. To check BG 4 times daily, to use with insurance preferred meter) 450 strip 3    calcium acetate,phosphat bind, (PHOSLO) 667 mg capsule Take 1 capsule (667 mg total) by mouth 3 (three)  "times daily with meals.      cetirizine (ZYRTEC) 10 MG tablet Take 1 tablet (10 mg total) by mouth every other day.      fluticasone propionate (FLONASE) 50 mcg/actuation nasal spray 2 sprays (100 mcg total) by Each Nostril route once daily.      gabapentin (NEURONTIN) 100 MG capsule Take 1 capsule (100 mg total) by mouth 2 (two) times daily.      hydrALAZINE (APRESOLINE) 50 MG tablet Take 1 tablet (50 mg total) by mouth every 12 (twelve) hours.      HYDROcodone-acetaminophen (NORCO) 5-325 mg per tablet Take 1 tablet by mouth every 6 (six) hours as needed. 15 tablet 0    insulin aspart U-100 (NOVOLOG FLEXPEN U-100 INSULIN) 100 unit/mL (3 mL) InPn pen Inject 5-8 units 3 times per day with food. 1 Box 6    insulin detemir U-100 (LEVEMIR FLEXTOUCH U-100 INSULN) 100 unit/mL (3 mL) InPn pen Inject 15 Units into the skin every evening.      lactulose (CHRONULAC) 10 gram/15 mL solution Take 20 g by mouth 2 (two) times a day.      losartan (COZAAR) 100 MG tablet Take 1 tablet (100 mg total) by mouth once daily.      oxybutynin (DITROPAN) 5 MG Tab Take 1 tablet (5 mg total) by mouth 3 (three) times daily.      pen needle, diabetic (BD ULTRA-FINE ROBERT PEN NEEDLE) 32 gauge x 5/32" Ndle To use 4 times per day with insulin injections. (Patient taking differently: 1 pen by Misc.(Non-Drug; Combo Route) route 4 (four) times daily. To use 4 times per day with insulin injections.) 450 each 2    polyethylene glycol (GLYCOLAX) 17 gram PwPk Take 17 g by mouth 2 (two) times daily as needed.      [DISCONTINUED] blood-glucose meter (ACCU-CHEK KAYLIE PLUS METER) Misc To use to check blood sugars 4 times a day 1 each 0    [DISCONTINUED] ciprofloxacin HCl (CIPRO) 500 MG tablet Take 1 tablet (500 mg total) by mouth once daily. On dialysis days give after HD. Last dose 6/29/22      [DISCONTINUED] clorazepate (TRANXENE) 3.75 MG Tab Take 7.5 mg by mouth nightly as needed.       [DISCONTINUED] dextrose (GLUCOSE GEL) 40 % gel Take " 37.5 mLs (15,000 mg total) by mouth once as needed (hypoglycemia). 37.5 g 4    [DISCONTINUED] ezetimibe (ZETIA) 10 mg tablet Take 1 tablet (10 mg total) by mouth once daily.      [DISCONTINUED] fenofibrate 160 MG Tab Take 1 tablet (160 mg total) by mouth once daily. 90 tablet 3    [DISCONTINUED] lancets Misc To use to check blood sugar 4 times daily. To use with insurance approved meter. Patient needs the round, purple one (Patient taking differently: 1 lancet by Misc.(Non-Drug; Combo Route) route 4 (four) times daily. To use to check blood sugar 4 times daily. To use with insurance approved meter. Patient needs the round, purple one) 450 each 3    [DISCONTINUED] lancing device with lancets (ACCU-CHEK SOFT DEV LANCETS) Kit To check blood sugars 4 times per day 400 each 3    [DISCONTINUED] multivitamin (THERAGRAN) per tablet Take 1 tablet by mouth once daily.      [DISCONTINUED] pantoprazole (PROTONIX) 40 MG tablet Take 1 tablet (40 mg total) by mouth once daily.      [DISCONTINUED] sodium chloride 0.9% SolP 50 mL with DAPTOmycin 500 mg SolR 500 mg Inject 500 mg into the vein every 48 hours. Until 6/29/22         Review of Systems:  Neg    Physical Exam:  Constitutional: nad, aao x 3  Heart: rrr, no m/r/g, wwp, no worsening edema, with wound vac  Lungs: ctab, no w/r/r/c, no lb  Abdomen: s/nt/nd, +BS    Results:  Recent Labs   Lab 06/28/22  0510 06/29/22  0504 06/30/22  0442    136 134*   K 4.8 5.3* 4.6   CL 97 98 98   CO2 30* 22* 25   BUN 51* 73* 49*   CREATININE 6.8* 9.1* 6.5*   ESTGFRAFRICA 7.2* 5.0* 7.6*   EGFRNONAA 6.2* 4.4* 6.6*   * 132* 117*       Recent Labs   Lab 06/25/22  0440 06/26/22  0710 06/27/22  0625 06/28/22  0510 06/29/22  0504 06/30/22  0442   CALCIUM 9.1   < > 9.6 10.0 10.2 9.7   MG 2.5  --  3.0* 2.8*  --   --     < > = values in this interval not displayed.       Recent Labs   Lab 01/30/20  0705 03/10/22  0650   PTH, Intact 466.0 H 387.0 H       No results for input(s):  POCTGLUCOSE in the last 168 hours.    Recent Labs   Lab 05/05/22  0320 05/29/22  1324 06/22/22  0618   Hemoglobin A1C 10.6 H 8.3 H 7.5 H       Recent Labs   Lab 06/27/22  0625 06/28/22  0510 06/29/22  0504   WBC 9.90 9.30 9.13   HGB 8.6* 9.0* 8.8*   HCT 30.5* 30.9* 28.9*    359 347   MCV 91 87 86   MCHC 28.2* 29.1* 30.4*       No results for input(s): BILITOT, BILIDIR, PROT, ALBUMIN, ALKPHOS, ALT, AST in the last 168 hours.    Recent Labs   Lab 05/29/22  0900 06/21/22  1612   Color, UA Yellow Yellow   Appearance, UA Clear Clear   pH, UA 8.0 >8.0 A   Specific [place], UA 1.015 1.010   Protein, UA 2+ A 3+ A   Glucose, UA 2+ A 2+ A   Ketones, UA Negative Negative   Urobilinogen, UA Negative Negative   Bilirubin (UA) Negative Negative   Occult Blood UA Negative Negative   Nitrite, UA Negative Negative   RBC, UA 1 1   WBC, UA 5 2   Bacteria Rare Negative   Hyaline Casts, UA 0 12 A       Recent Labs   Lab 05/19/21  0300   POC PH 7.273 L   POC PCO2 47.8 H   POC HCO3 22.1 L   POC PO2 22 LL   POC SATURATED O2 30 L   POC BE -5   Sample VENOUS     I have spent >  minutes providing care for this patient for the above diagnoses. These services have included chart/data/imaging review, evaluation, exam, formulation of plan, , note preparation, and discussions with staff involved in this patient's care.    Bryce Craig MD  [facility]  [address] [place] [id number]  [phone] (p)  [phone] (f)

## 2022-06-30 NOTE — PLAN OF CARE
06/30/22 1029   Post-Acute Status   Post-Acute Authorization Placement   Post-Acute Placement Status Set-up Complete/Auth obtained   Discharge Plan   Discharge Plan A Long-term acute care facility (LTAC)   Discharge Plan B Long-term acute care facility (LTAC)   Notified that LTAC has auth for patient. Liaison will let Cm know when have bed.

## 2022-06-30 NOTE — DISCHARGE INSTRUCTIONS
Diet renal     Change dressing (specify)   Order Comments: Per Podiatry:   Continue would vac on left foot- wound VAC is bridged to all wounds of patient's plantar foot     Recommend Xeroform followed by bulky wrap with ABD and extra foams to heel wound and great toe wound     Float heels at all times     Pillow boots as well     vicky and glucerna daily- a1c is much improved!     Activity as tolerated       PT Eval and Treat

## 2022-07-02 PROBLEM — D63.1 ANEMIA DUE TO END STAGE RENAL DISEASE: Status: RESOLVED | Noted: 2022-06-30 | Resolved: 2022-07-02

## 2022-07-02 PROBLEM — N18.6 ANEMIA DUE TO END STAGE RENAL DISEASE: Status: RESOLVED | Noted: 2022-06-30 | Resolved: 2022-07-02

## 2022-07-02 NOTE — DISCHARGE SUMMARY
[facility] Medicine  Discharge Summary      Patient Name: Leanna García  MRN: [id number]  Patient Class: IP- Inpatient  Admission Date: 6/21/2022  Hospital Length of Stay: 9 days  Discharge Date and Time:  07/02/2022 3:39 PM  Attending Physician: Precious att. providers found   Discharging Provider: Kory Millard MD  Primary Care Provider: Stefano Lopez MD      HPI:   Leanna García is a 56 y.o.  female who  has a past medical history of A-fib, Arthritis, Bronchitis, Cardiovascular event risk, ASCVD 10-year risk 6.7% (10/15/2016), Diabetes mellitus, Diabetes mellitus type II, Diabetic ulcer of left midfoot (5/5/2022), Disorder of kidney and ureter, Encounter for blood transfusion, ESRD (end stage renal disease) (5/18/2018), MANJINDER (generalized anxiety disorder) (10/25/2016), History of colon polyps (11/02/2016), Hyperlipidemia, Hypertension, and Stroke.. The patient presented to [facility] on 6/21/2022 with a primary complaint of Foot Injury (Pt sent by MD for evaluation of her left foot.)  Patient is currently undergoing treat at [facility] and followed up with podiatrist today.  Podiatry evaluated patient and referred patient to the ED on account of worsening diabetic foot ulcer.       Procedure(s) (LRB):  Angiogram, Abdominal Aorta W/ Extremity Runoff (N/A)  PTA, Superficial Femoral Artery      Hospital Course:   Pt is a 57 y/o F with hx of AFib, arthritis, hyperlipidemia, hypertension, type 2 diabetes, ESRD, diabetic foot ulceration with diabetic foot infection.  Patient initially presented to the hospital at the direction of her podiatrist due to worsening foot ulcerations and infection.  She has ulcerations on both feet.  Podiatry evaluated her here and performed bedside debridements.  She underwent evaluation with vascular surgery who performed angiography with good flow to the right foot and they performed balloon angioplasty of the popliteal artery on  the left to restore blood flow to the left lower extremity.  Vascular surgery and Podiatry both feel that her feet her salvageable with proper wound care and antibiotic therapy.  Infectious Disease was consulted and directed the antibiotic recommendations.  She will continue on Unasyn and daptomycin with an end date of 08/02/2022.  She has a wound VAC on her left foot that is bridged to all wounds on the left foot.  This is to continue as an outpatient.  We have recommended that she return to long-term acute care for ongoing management of her chronic severe issues.  I discussed this with her insurance company and she was able to receive authorization for transfer to [facility].  She will continue to follow-up with Wound Care and Podiatry as an outpatient and at the [facility] for management of her foot infections.  I saw the patient on the day of discharge and discussed the plan of care with her.  She was discharged in good condition to [facility] for ongoing management.          Goals of Care Treatment Preferences:  Code Status: Full Code      Consults:   Consults (From admission, onward)        Status Ordering Provider     Inpatient consult to Social Work/Case Management  Once        Provider:  (Not yet assigned)    Completed ARMANDO GARCIA     Inpatient consult to Nephrology  Once        Provider:  Ashleigh Soria MD    Completed ALVARO RAYA     Inpatient consult to Podiatry  Once        Provider:  Alivia Bruno DPM    Completed ALVARO RAYA     Inpatient consult to Vascular Surgery  Once        Provider:  Ali Khoobehi, MD    Completed ALVARO RAYA     Inpatient consult to Infectious Diseases  Once        Provider:  Janki Bailon MD    Completed ALVARO RAYA          No new Assessment & Plan notes have been filed under this hospital service since the last note was generated.  Service: Hospital Medicine    Final Active Diagnoses:    Diagnosis Date Noted POA    PRINCIPAL PROBLEM:  Diabetic ulcer of  left midfoot, and right foot [E11.621, L97.429] 05/05/2022 Yes    Ulcer of right foot with fat layer exposed [L97.512] 06/28/2022 Yes    PAD (peripheral artery disease) [I73.9] 06/27/2022 Yes    Chronic anemia [D64.9] 05/08/2022 Yes    Type 2 diabetes mellitus with kidney complication, with long-term current use of insulin [E11.29, Z79.4] 06/25/2020 Not Applicable    History of TIA (transient ischemic attack) [Z86.73] 06/25/2020 Not Applicable    ESRD (end stage renal disease) [N18.6] 05/18/2018 Yes    Hypertension associated with diabetes [E11.59, I15.2] 06/13/2013 Yes     Chronic    Hyperlipidemia associated with type 2 diabetes mellitus [E11.69, E78.5] 06/22/2012 Yes      Problems Resolved During this Admission:       Discharged Condition: good    Disposition: Long Term Acute Care    Follow Up:   Follow-up Information     Stefano Lopez MD. Schedule an appointment as soon as possible for a visit in 1 week(s).    Specialty: Family Medicine  Contact information:  [address] [facility] [address]  [phone]                       Patient Instructions:      Diet renal     Change dressing (specify)   Order Comments: Per Podiatry:   Continue would vac on left foot- wound VAC is bridged to all wounds of patient's plantar foot     Recommend Xeroform followed by bulky wrap with ABD and extra foams to heel wound and great toe wound     Float heels at all times     Pillow boots as well     vicky and glucerna daily- a1c is much improved!     Activity as tolerated       Significant Diagnostic Studies: Labs:   BMP:   Recent Labs   Lab 07/01/22  0602   *      K 5.4*      CO2 20*   BUN 69*   CREATININE 8.4*   CALCIUM 10.5   , CBC   Recent Labs   Lab 07/01/22  0602   WBC 9.95   HGB 9.5*   HCT 32.0*       and All labs within the past 24 hours have been reviewed  Microbiology:   Blood Culture   Lab Results   Component Value Date    LABBLOO No growth after 5 days. 06/21/2022   , Urine  "Culture    Lab Results   Component Value Date    LABURIN ESCHERICHIA COLI  >100,000 cfu/ml   08/28/2018    and Wound Culture: positive for Acinetobacter and Staph    Pending Diagnostic Studies:     None         Medications:  Reconciled Home Medications:      Medication List      START taking these medications    AMPICILLIN/SULBACTAM 3 G/100 ML NS (READY TO MIX)  Inject 100 mLs (3 g total) into the vein every 12 (twelve) hours.     clopidogreL 75 mg tablet  Commonly known as: PLAVIX  Take 1 tablet (75 mg total) by mouth once daily.     epoetin chris-epbx 4,000 unit/mL injection  Commonly known as: RETACRIT  Inject 1.11 mLs (4,440 Units total) into the skin every Mon, Wed, Fri.        CHANGE how you take these medications    atorvastatin 80 MG tablet  Commonly known as: LIPITOR  Take 1 tablet (80 mg total) by mouth once daily.  What changed: when to take this     blood sugar diagnostic Strp  To check BG 4 times daily, to use with insurance preferred meter  What changed:   · how much to take  · how to take this  · when to take this     pen needle, diabetic 32 gauge x 5/32" Ndle  Commonly known as: BD ULTRA-FINE ROBERT PEN NEEDLE  To use 4 times per day with insulin injections.  What changed:   · how much to take  · how to take this  · when to take this        CONTINUE taking these medications    amLODIPine 10 MG tablet  Commonly known as: NORVASC  Take 1 tablet (10 mg total) by mouth once daily.     aspirin 81 MG EC tablet  Commonly known as: ECOTRIN  Take 81 mg by mouth once daily.     azelastine 137 mcg (0.1 %) nasal spray  Commonly known as: ASTELIN  1 spray by Nasal route 2 (two) times a day.     calcium acetate(phosphat bind) 667 mg capsule  Commonly known as: PHOSLO  Take 1 capsule (667 mg total) by mouth 3 (three) times daily with meals.     cetirizine 10 MG tablet  Commonly known as: ZYRTEC  Take 1 tablet (10 mg total) by mouth every other day.     fluticasone propionate 50 mcg/actuation nasal spray  Commonly " known as: FLONASE  2 sprays (100 mcg total) by Each Nostril route once daily.     gabapentin 100 MG capsule  Commonly known as: NEURONTIN  Take 1 capsule (100 mg total) by mouth 2 (two) times daily.     hydrALAZINE 50 MG tablet  Commonly known as: APRESOLINE  Take 1 tablet (50 mg total) by mouth every 12 (twelve) hours.     HYDROcodone-acetaminophen 5-325 mg per tablet  Commonly known as: NORCO  Take 1 tablet by mouth every 6 (six) hours as needed.     insulin aspart U-100 100 unit/mL (3 mL) Inpn pen  Commonly known as: NovoLOG Flexpen U-100 Insulin  Inject 5-8 units 3 times per day with food.     lactulose 10 gram/15 mL solution  Commonly known as: CHRONULAC  Take 20 g by mouth 2 (two) times a day.     LEVEMIR FLEXTOUCH U-100 INSULN 100 unit/mL (3 mL) Inpn pen  Generic drug: insulin detemir U-100  Inject 15 Units into the skin every evening.     losartan 100 MG tablet  Commonly known as: COZAAR  Take 1 tablet (100 mg total) by mouth once daily.     oxybutynin 5 MG Tab  Commonly known as: DITROPAN  Take 1 tablet (5 mg total) by mouth 3 (three) times daily.     polyethylene glycol 17 gram Pwpk  Commonly known as: GLYCOLAX  Take 17 g by mouth 2 (two) times daily as needed.     sodium chloride 0.9% SolP 50 mL with DAPTOmycin 500 mg SolR 500 mg  Inject 500 mg into the vein every 48 hours. Until 6/29/22        STOP taking these medications    blood-glucose meter Misc  Commonly known as: ACCU-CHEK KAYLIE PLUS METER     ciprofloxacin HCl 500 MG tablet  Commonly known as: CIPRO     clorazepate 3.75 MG Tab  Commonly known as: TRANXENE     dextrose 40 % gel  Commonly known as: Glucose GeL     ezetimibe 10 mg tablet  Commonly known as: ZETIA     fenofibrate 160 MG Tab     lancets Misc     lancing device with lancets Kit  Commonly known as: ACCU-CHEK SOFT DEV LANCETS     multivitamin per tablet  Commonly known as: THERAGRAN     pantoprazole 40 MG tablet  Commonly known as: PROTONIX            Indwelling Lines/Drains at time of  discharge:   Lines/Drains/Airways     Drain  Duration                Hemodialysis AV Fistula Left upper arm -- days                Time spent on the discharge of patient: 53 minutes         Kory Millard MD  Department of Hospital Medicine  [facility]

## 2022-07-04 PROBLEM — L97.523 ULCER OF LEFT FOOT WITH NECROSIS OF MUSCLE: Status: ACTIVE | Noted: 2022-07-04

## 2022-07-04 PROBLEM — L97.524 ULCER OF LEFT FOOT WITH NECROSIS OF BONE: Status: ACTIVE | Noted: 2022-07-04

## 2022-07-07 PROBLEM — K59.01 SLOW TRANSIT CONSTIPATION: Status: ACTIVE | Noted: 2022-07-07

## 2022-07-14 ENCOUNTER — TELEPHONE (OUTPATIENT)
Dept: PODIATRY | Facility: CLINIC | Age: 57
End: 2022-07-14
Payer: MEDICAID

## 2022-07-14 NOTE — TELEPHONE ENCOUNTER
----- Message from Ksenia Carr sent at 7/14/2022 10:14 AM CDT -----  Mr. hill is calling with some questions about wife, she is in the ltac in [place] and would like Dr. PETE Bruno to please call him back only at [phone].  said that the ltac place is suppose to have his wife's foot wrapped and they have it completely unwrapped and haven't wrapped it back since they took the bandages off yesterday!    Thanks   Molly

## 2022-07-26 ENCOUNTER — TELEPHONE (OUTPATIENT)
Dept: TRANSPLANT | Facility: CLINIC | Age: 57
End: 2022-07-26
Payer: MEDICAID

## 2022-07-26 NOTE — TELEPHONE ENCOUNTER
Chart reviewed with Dr. Corbett: not a candidate due to active foot ulcer, current undergoing treatment at LTAC, may need possible BKA. Will be discussed at selection committee on Friday.

## 2022-07-29 ENCOUNTER — COMMITTEE REVIEW (OUTPATIENT)
Dept: TRANSPLANT | Facility: CLINIC | Age: 57
End: 2022-07-29
Payer: MEDICAID

## 2022-07-29 NOTE — COMMITTEE REVIEW
Native Organ Dx: Diabetes Mellitus - Type II      Not approved for LRD/CAD transplant due to active ulcers (foot, toe and heel). Current undergoing treatment at LTAC. per Podiatry, may need possible BKA    Patient informed of committee decision. All questions were answered and patient voiced understanding.   Note written by     ===============================================    I was present at the meeting and attest to the decision of the committee

## 2022-07-29 NOTE — LETTER
July 29, 2022    Leanna García  [address]  [place] [address]    Dear Leanna García:  MRN: [id number]    It is the duty of the [facility] Selection [facility] to determine which patients are candidates for a transplant. For this reason, our committee has the difficult task of evaluating patients to determine which ones have the greatest chance of having a successful transplant. We are aware of the magnitude of this responsibility, and we approach it with reverence and humility.    It is with regret I inform you that you are not approved as a transplant candidate due to active ulcers (foot, toe and heel). Based on this review, we have determined that at this time, you are not a candidate for a transplant at [facility].      The selection committee carefully considers each patient's transplant candidacy and determines whether it is safe to proceed with transplantation on a case-by-case basis using established selection criteria.  At present, the risk of proceeding with an elective transplant surgery has become too high.                                                                               Although the selection committee believes you are not a suitable transplant candidate, you have the option to be evaluated at other transplant centers who may have different selection criteria.  You may request your [facility] records be sent to any center of your choice by contacting our Medical Records Department at [phone].                                                                               Attached is a letter from the [employer] Network for Organ Sharing (UNOS).  It describes the services and information offered to patients by UNOS and the Organ Procurement and Transplant Network.    The [facility] sincerely wishes you the best and remains available to answer any questions.  Please do not hesitate to contact our pre-transplant office if we can assist you in any other  way.                                                                               Sincerely,      Elizabeth Johnson MD  Medical Director, Kidney & Kidney/Pancreas Transplantation    Cc: Dr. Keoyn Ramachandran         [facility] [place]    Encl: UNOS Letter             The Organ Procurement and Transplantation Network   Toll-free patient services line: Your resource for organ transplant information     If you have a question regarding your own medical care, you always should call your transplant hospital first. However, for general organ transplant-related information, you can call the Organ Procurement and Transplantation Network (OPTN) toll-free patient services line at [phone].     Anyone, including potential transplant candidates, candidates, recipients, family members, friends, living donors, and donor family members, can call this number to:     · Talk about organ donation, living donation, the transplant process, the donation process, and transplant policies.   · Get a free patient information kit with helpful booklets, waiting list and transplant information, and a list of all transplant hospitals.   · Ask questions about the OPTN website (https://optn.transplant.[facility].gov/), the [employer] Network for Organ Sharings (UNOS) website (https://unos.org/), or the UNOS website for living donors and transplant recipients. (https://www.transplantliving.org/).   · Learn how the OPTN can help you.   · Talk about any concerns that you may have with a transplant hospital.     The nations transplant system, the OPTN, is managed under federal contract by the [employer] for Organ Sharing (UNOS), which is a non-profit charitable organization. The OPTN helps create and define organ sharing policies that make the best use of donated organs. This process continuously evaluating new advances and discoveries so policies can be adapted to best serve patients waiting for transplants. To do so, the OPTN works  closely with transplant professionals, transplant patients, transplant candidates, donor families, living donors, and the public. All transplant programs and organ procurement organizations throughout the country are OPTN members and are obligated to follow the policies the OPTN creates for allocating organs.     The OPTN also is responsible for:   · Providing educational material for patients, the public, and professionals.   · Raising awareness of the need for donated organs and tissue.   · Coordinating organ procurement, matching, and placement.   · Collecting information about every organ transplant and donation that occurs in the [place].     Remember, you should contact your transplant hospital directly if you have questions or concerns about your own medical care including medical records, work-up progress, and test results.     We are not your transplant hospital, and our staff will not be able to answer questions about your case, so please keep your transplant hospitals phone number handy.   However, while you research your transplant needs and learn as much as you can about transplantation and donation, we welcome your call to our toll-free patient services line at [phone].

## 2022-08-18 ENCOUNTER — HOSPITAL ENCOUNTER (INPATIENT)
Facility: HOSPITAL | Age: 57
LOS: 10 days | Discharge: HOME HEALTH | DRG: 617 | End: 2022-08-30
Attending: EMERGENCY MEDICINE | Admitting: INTERNAL MEDICINE
Payer: MEDICARE

## 2022-08-18 DIAGNOSIS — M86.072 ACUTE HEMATOGENOUS OSTEOMYELITIS OF LEFT FOOT: Primary | ICD-10-CM

## 2022-08-18 DIAGNOSIS — E11.621 DIABETIC ULCER OF RIGHT MIDFOOT ASSOCIATED WITH TYPE 2 DIABETES MELLITUS: ICD-10-CM

## 2022-08-18 DIAGNOSIS — M86.9 OSTEOMYELITIS OF LEFT FOOT, UNSPECIFIED TYPE: ICD-10-CM

## 2022-08-18 DIAGNOSIS — E11.621 DIABETIC ULCER OF LEFT MIDFOOT ASSOCIATED WITH TYPE 2 DIABETES MELLITUS: ICD-10-CM

## 2022-08-18 DIAGNOSIS — Z79.4 TYPE 2 DIABETES MELLITUS WITH CHRONIC KIDNEY DISEASE ON CHRONIC DIALYSIS, WITH LONG-TERM CURRENT USE OF INSULIN: ICD-10-CM

## 2022-08-18 DIAGNOSIS — L97.421 DIABETIC ULCER OF LEFT MIDFOOT ASSOCIATED WITH TYPE 2 DIABETES MELLITUS, LIMITED TO BREAKDOWN OF SKIN: ICD-10-CM

## 2022-08-18 DIAGNOSIS — I73.9 PAD (PERIPHERAL ARTERY DISEASE): ICD-10-CM

## 2022-08-18 DIAGNOSIS — E11.621 DIABETIC ULCER OF LEFT MIDFOOT ASSOCIATED WITH TYPE 2 DIABETES MELLITUS, LIMITED TO BREAKDOWN OF SKIN: ICD-10-CM

## 2022-08-18 DIAGNOSIS — E11.621 DIABETIC ULCER OF LEFT MIDFOOT ASSOCIATED WITH TYPE 2 DIABETES MELLITUS, WITH NECROSIS OF MUSCLE: ICD-10-CM

## 2022-08-18 DIAGNOSIS — L97.429 DIABETIC ULCER OF LEFT MIDFOOT ASSOCIATED WITH TYPE 2 DIABETES MELLITUS: ICD-10-CM

## 2022-08-18 DIAGNOSIS — K31.84 GASTROPARESIS: ICD-10-CM

## 2022-08-18 DIAGNOSIS — E11.22 TYPE 2 DIABETES MELLITUS WITH CHRONIC KIDNEY DISEASE ON CHRONIC DIALYSIS, WITH LONG-TERM CURRENT USE OF INSULIN: ICD-10-CM

## 2022-08-18 DIAGNOSIS — L97.523 ULCER OF LEFT FOOT WITH NECROSIS OF MUSCLE: ICD-10-CM

## 2022-08-18 DIAGNOSIS — L97.411 DIABETIC ULCER OF RIGHT MIDFOOT ASSOCIATED WITH TYPE 2 DIABETES MELLITUS, LIMITED TO BREAKDOWN OF SKIN: ICD-10-CM

## 2022-08-18 DIAGNOSIS — L97.423 DIABETIC ULCER OF LEFT MIDFOOT ASSOCIATED WITH TYPE 2 DIABETES MELLITUS, WITH NECROSIS OF MUSCLE: ICD-10-CM

## 2022-08-18 DIAGNOSIS — R07.2 PRECORDIAL PAIN: ICD-10-CM

## 2022-08-18 DIAGNOSIS — L97.413 DIABETIC ULCER OF RIGHT MIDFOOT ASSOCIATED WITH TYPE 2 DIABETES MELLITUS, WITH NECROSIS OF MUSCLE: ICD-10-CM

## 2022-08-18 DIAGNOSIS — N18.6 TYPE 2 DIABETES MELLITUS WITH CHRONIC KIDNEY DISEASE ON CHRONIC DIALYSIS, WITH LONG-TERM CURRENT USE OF INSULIN: ICD-10-CM

## 2022-08-18 DIAGNOSIS — L97.419 DIABETIC ULCER OF RIGHT MIDFOOT ASSOCIATED WITH TYPE 2 DIABETES MELLITUS: ICD-10-CM

## 2022-08-18 DIAGNOSIS — R07.9 CHEST PAIN: ICD-10-CM

## 2022-08-18 DIAGNOSIS — E11.621 DIABETIC ULCER OF RIGHT MIDFOOT ASSOCIATED WITH TYPE 2 DIABETES MELLITUS, LIMITED TO BREAKDOWN OF SKIN: ICD-10-CM

## 2022-08-18 DIAGNOSIS — L97.524 ULCER OF LEFT FOOT WITH NECROSIS OF BONE: ICD-10-CM

## 2022-08-18 DIAGNOSIS — E11.621 DIABETIC ULCER OF RIGHT MIDFOOT ASSOCIATED WITH TYPE 2 DIABETES MELLITUS, WITH NECROSIS OF MUSCLE: ICD-10-CM

## 2022-08-18 DIAGNOSIS — Z99.2 TYPE 2 DIABETES MELLITUS WITH CHRONIC KIDNEY DISEASE ON CHRONIC DIALYSIS, WITH LONG-TERM CURRENT USE OF INSULIN: ICD-10-CM

## 2022-08-18 DIAGNOSIS — N18.6 ESRD (END STAGE RENAL DISEASE): ICD-10-CM

## 2022-08-18 DIAGNOSIS — R52 PAIN: ICD-10-CM

## 2022-08-18 LAB
ALBUMIN SERPL BCP-MCNC: 3.8 G/DL (ref 3.5–5.2)
ALP SERPL-CCNC: 79 U/L (ref 55–135)
ALT SERPL W/O P-5'-P-CCNC: 29 U/L (ref 10–44)
ANION GAP SERPL CALC-SCNC: 13 MMOL/L (ref 8–16)
AST SERPL-CCNC: 21 U/L (ref 10–40)
BASOPHILS # BLD AUTO: 0.07 K/UL (ref 0–0.2)
BASOPHILS NFR BLD: 0.7 % (ref 0–1.9)
BILIRUB SERPL-MCNC: 0.5 MG/DL (ref 0.1–1)
BUN SERPL-MCNC: 54 MG/DL (ref 6–20)
CALCIUM SERPL-MCNC: 9.5 MG/DL (ref 8.7–10.5)
CHLORIDE SERPL-SCNC: 96 MMOL/L (ref 95–110)
CK MB SERPL-MCNC: 3.9 NG/ML (ref 0.1–6.5)
CK SERPL-CCNC: 62 U/L (ref 20–180)
CO2 SERPL-SCNC: 26 MMOL/L (ref 23–29)
CREAT SERPL-MCNC: 6.2 MG/DL (ref 0.5–1.4)
DIFFERENTIAL METHOD: ABNORMAL
EOSINOPHIL # BLD AUTO: 0.2 K/UL (ref 0–0.5)
EOSINOPHIL NFR BLD: 2.2 % (ref 0–8)
ERYTHROCYTE [DISTWIDTH] IN BLOOD BY AUTOMATED COUNT: 15.4 % (ref 11.5–14.5)
EST. GFR  (NO RACE VARIABLE): 7.4 ML/MIN/1.73 M^2
GLUCOSE SERPL-MCNC: 233 MG/DL (ref 70–110)
GLUCOSE SERPL-MCNC: 237 MG/DL (ref 70–110)
GLUCOSE SERPL-MCNC: 255 MG/DL (ref 70–110)
HCT VFR BLD AUTO: 37.6 % (ref 37–48.5)
HGB BLD-MCNC: 11.5 G/DL (ref 12–16)
IMM GRANULOCYTES # BLD AUTO: 0.05 K/UL (ref 0–0.04)
IMM GRANULOCYTES NFR BLD AUTO: 0.5 % (ref 0–0.5)
LIPASE SERPL-CCNC: 54 U/L (ref 4–60)
LYMPHOCYTES # BLD AUTO: 1.9 K/UL (ref 1–4.8)
LYMPHOCYTES NFR BLD: 17.7 % (ref 18–48)
MCH RBC QN AUTO: 26.5 PG (ref 27–31)
MCHC RBC AUTO-ENTMCNC: 30.6 G/DL (ref 32–36)
MCV RBC AUTO: 87 FL (ref 82–98)
MONOCYTES # BLD AUTO: 0.9 K/UL (ref 0.3–1)
MONOCYTES NFR BLD: 8.7 % (ref 4–15)
NEUTROPHILS # BLD AUTO: 7.4 K/UL (ref 1.8–7.7)
NEUTROPHILS NFR BLD: 70.2 % (ref 38–73)
NRBC BLD-RTO: 0 /100 WBC
PLATELET # BLD AUTO: 351 K/UL (ref 150–450)
PMV BLD AUTO: 10.1 FL (ref 9.2–12.9)
POTASSIUM SERPL-SCNC: 4.9 MMOL/L (ref 3.5–5.1)
PROT SERPL-MCNC: 8.6 G/DL (ref 6–8.4)
RBC # BLD AUTO: 4.34 M/UL (ref 4–5.4)
SARS-COV-2 RDRP RESP QL NAA+PROBE: NEGATIVE
SODIUM SERPL-SCNC: 135 MMOL/L (ref 136–145)
TROPONIN I SERPL DL<=0.01 NG/ML-MCNC: 0.03 NG/ML
WBC # BLD AUTO: 10.53 K/UL (ref 3.9–12.7)

## 2022-08-18 PROCEDURE — U0002 COVID-19 LAB TEST NON-CDC: HCPCS | Performed by: EMERGENCY MEDICINE

## 2022-08-18 PROCEDURE — 93010 ELECTROCARDIOGRAM REPORT: CPT | Mod: 76,,, | Performed by: SPECIALIST

## 2022-08-18 PROCEDURE — 93005 ELECTROCARDIOGRAM TRACING: CPT | Performed by: SPECIALIST

## 2022-08-18 PROCEDURE — 80053 COMPREHEN METABOLIC PANEL: CPT | Performed by: STUDENT IN AN ORGANIZED HEALTH CARE EDUCATION/TRAINING PROGRAM

## 2022-08-18 PROCEDURE — G0378 HOSPITAL OBSERVATION PER HR: HCPCS

## 2022-08-18 PROCEDURE — 63600175 PHARM REV CODE 636 W HCPCS: Performed by: EMERGENCY MEDICINE

## 2022-08-18 PROCEDURE — 83690 ASSAY OF LIPASE: CPT | Performed by: STUDENT IN AN ORGANIZED HEALTH CARE EDUCATION/TRAINING PROGRAM

## 2022-08-18 PROCEDURE — 96375 TX/PRO/DX INJ NEW DRUG ADDON: CPT

## 2022-08-18 PROCEDURE — 82550 ASSAY OF CK (CPK): CPT | Performed by: STUDENT IN AN ORGANIZED HEALTH CARE EDUCATION/TRAINING PROGRAM

## 2022-08-18 PROCEDURE — 63600175 PHARM REV CODE 636 W HCPCS: Performed by: INTERNAL MEDICINE

## 2022-08-18 PROCEDURE — 82962 GLUCOSE BLOOD TEST: CPT

## 2022-08-18 PROCEDURE — 84484 ASSAY OF TROPONIN QUANT: CPT | Performed by: NURSE PRACTITIONER

## 2022-08-18 PROCEDURE — 25000003 PHARM REV CODE 250: Performed by: EMERGENCY MEDICINE

## 2022-08-18 PROCEDURE — 99285 EMERGENCY DEPT VISIT HI MDM: CPT | Mod: 25

## 2022-08-18 PROCEDURE — 85025 COMPLETE CBC W/AUTO DIFF WBC: CPT | Performed by: STUDENT IN AN ORGANIZED HEALTH CARE EDUCATION/TRAINING PROGRAM

## 2022-08-18 PROCEDURE — 82553 CREATINE MB FRACTION: CPT | Performed by: STUDENT IN AN ORGANIZED HEALTH CARE EDUCATION/TRAINING PROGRAM

## 2022-08-18 PROCEDURE — 93010 EKG 12-LEAD: ICD-10-PCS | Mod: 76,,, | Performed by: SPECIALIST

## 2022-08-18 PROCEDURE — 84484 ASSAY OF TROPONIN QUANT: CPT | Mod: 91 | Performed by: STUDENT IN AN ORGANIZED HEALTH CARE EDUCATION/TRAINING PROGRAM

## 2022-08-18 PROCEDURE — 96367 TX/PROPH/DG ADDL SEQ IV INF: CPT

## 2022-08-18 PROCEDURE — 36415 COLL VENOUS BLD VENIPUNCTURE: CPT | Performed by: STUDENT IN AN ORGANIZED HEALTH CARE EDUCATION/TRAINING PROGRAM

## 2022-08-18 PROCEDURE — 83880 ASSAY OF NATRIURETIC PEPTIDE: CPT | Performed by: NURSE PRACTITIONER

## 2022-08-18 PROCEDURE — 96365 THER/PROPH/DIAG IV INF INIT: CPT

## 2022-08-18 RX ORDER — DIAZEPAM 10 MG/2ML
2.5 INJECTION INTRAMUSCULAR
Status: COMPLETED | OUTPATIENT
Start: 2022-08-18 | End: 2022-08-18

## 2022-08-18 RX ORDER — HEPARIN SODIUM 5000 [USP'U]/ML
5000 INJECTION, SOLUTION INTRAVENOUS; SUBCUTANEOUS EVERY 8 HOURS
Status: DISCONTINUED | OUTPATIENT
Start: 2022-08-19 | End: 2022-08-30 | Stop reason: HOSPADM

## 2022-08-18 RX ORDER — IBUPROFEN 200 MG
24 TABLET ORAL
Status: DISCONTINUED | OUTPATIENT
Start: 2022-08-19 | End: 2022-08-24

## 2022-08-18 RX ORDER — OXYBUTYNIN CHLORIDE 5 MG/1
5 TABLET ORAL 3 TIMES DAILY
Status: DISCONTINUED | OUTPATIENT
Start: 2022-08-19 | End: 2022-08-18

## 2022-08-18 RX ORDER — LOSARTAN POTASSIUM 50 MG/1
100 TABLET ORAL DAILY
Status: DISCONTINUED | OUTPATIENT
Start: 2022-08-19 | End: 2022-08-30 | Stop reason: HOSPADM

## 2022-08-18 RX ORDER — POLYETHYLENE GLYCOL 3350 17 G/17G
17 POWDER, FOR SOLUTION ORAL DAILY
Status: DISCONTINUED | OUTPATIENT
Start: 2022-08-19 | End: 2022-08-19

## 2022-08-18 RX ORDER — CALCIUM ACETATE 667 MG/1
667 CAPSULE ORAL
Status: DISCONTINUED | OUTPATIENT
Start: 2022-08-19 | End: 2022-08-30 | Stop reason: HOSPADM

## 2022-08-18 RX ORDER — FAMOTIDINE 10 MG/ML
20 INJECTION INTRAVENOUS
Status: COMPLETED | OUTPATIENT
Start: 2022-08-18 | End: 2022-08-18

## 2022-08-18 RX ORDER — IBUPROFEN 200 MG
16 TABLET ORAL
Status: DISCONTINUED | OUTPATIENT
Start: 2022-08-19 | End: 2022-08-24

## 2022-08-18 RX ORDER — METOCLOPRAMIDE HYDROCHLORIDE 5 MG/ML
10 INJECTION INTRAMUSCULAR; INTRAVENOUS EVERY 8 HOURS
Status: DISCONTINUED | OUTPATIENT
Start: 2022-08-19 | End: 2022-08-19

## 2022-08-18 RX ORDER — SODIUM,POTASSIUM PHOSPHATES 280-250MG
2 POWDER IN PACKET (EA) ORAL
Status: DISCONTINUED | OUTPATIENT
Start: 2022-08-19 | End: 2022-08-18

## 2022-08-18 RX ORDER — ASPIRIN 81 MG/1
81 TABLET ORAL DAILY
Status: DISCONTINUED | OUTPATIENT
Start: 2022-08-19 | End: 2022-08-30 | Stop reason: HOSPADM

## 2022-08-18 RX ORDER — AMLODIPINE BESYLATE 5 MG/1
10 TABLET ORAL DAILY
Status: DISCONTINUED | OUTPATIENT
Start: 2022-08-19 | End: 2022-08-30 | Stop reason: HOSPADM

## 2022-08-18 RX ORDER — HYDRALAZINE HYDROCHLORIDE 25 MG/1
50 TABLET, FILM COATED ORAL EVERY 8 HOURS
Status: DISCONTINUED | OUTPATIENT
Start: 2022-08-19 | End: 2022-08-30 | Stop reason: HOSPADM

## 2022-08-18 RX ORDER — METOCLOPRAMIDE HYDROCHLORIDE 5 MG/ML
10 INJECTION INTRAMUSCULAR; INTRAVENOUS
Status: COMPLETED | OUTPATIENT
Start: 2022-08-18 | End: 2022-08-18

## 2022-08-18 RX ORDER — GLUCAGON 1 MG
1 KIT INJECTION
Status: DISCONTINUED | OUTPATIENT
Start: 2022-08-19 | End: 2022-08-24

## 2022-08-18 RX ORDER — ONDANSETRON 2 MG/ML
4 INJECTION INTRAMUSCULAR; INTRAVENOUS
Status: COMPLETED | OUTPATIENT
Start: 2022-08-18 | End: 2022-08-18

## 2022-08-18 RX ORDER — AMOXICILLIN AND CLAVULANATE POTASSIUM 500; 125 MG/1; MG/1
1 TABLET, FILM COATED ORAL 2 TIMES DAILY
Qty: 14 TABLET | Refills: 0 | Status: SHIPPED | OUTPATIENT
Start: 2022-08-18 | End: 2022-08-30 | Stop reason: HOSPADM

## 2022-08-18 RX ORDER — NAPROXEN SODIUM 220 MG/1
324 TABLET, FILM COATED ORAL
Status: COMPLETED | OUTPATIENT
Start: 2022-08-18 | End: 2022-08-18

## 2022-08-18 RX ORDER — ONDANSETRON 2 MG/ML
4 INJECTION INTRAMUSCULAR; INTRAVENOUS
Status: DISCONTINUED | OUTPATIENT
Start: 2022-08-18 | End: 2022-08-18

## 2022-08-18 RX ORDER — SODIUM CHLORIDE 0.9 % (FLUSH) 0.9 %
10 SYRINGE (ML) INJECTION EVERY 12 HOURS PRN
Status: DISCONTINUED | OUTPATIENT
Start: 2022-08-19 | End: 2022-08-30 | Stop reason: HOSPADM

## 2022-08-18 RX ORDER — LANOLIN ALCOHOL/MO/W.PET/CERES
800 CREAM (GRAM) TOPICAL
Status: DISCONTINUED | OUTPATIENT
Start: 2022-08-19 | End: 2022-08-18

## 2022-08-18 RX ORDER — AMOXICILLIN 250 MG
1 CAPSULE ORAL 2 TIMES DAILY PRN
Status: DISCONTINUED | OUTPATIENT
Start: 2022-08-19 | End: 2022-08-30 | Stop reason: HOSPADM

## 2022-08-18 RX ORDER — HYDRALAZINE HYDROCHLORIDE 20 MG/ML
10 INJECTION INTRAMUSCULAR; INTRAVENOUS EVERY 8 HOURS PRN
Status: DISCONTINUED | OUTPATIENT
Start: 2022-08-19 | End: 2022-08-18

## 2022-08-18 RX ORDER — HYDRALAZINE HYDROCHLORIDE 20 MG/ML
10 INJECTION INTRAMUSCULAR; INTRAVENOUS EVERY 8 HOURS PRN
Status: DISCONTINUED | OUTPATIENT
Start: 2022-08-18 | End: 2022-08-30 | Stop reason: HOSPADM

## 2022-08-18 RX ORDER — NALOXONE HCL 0.4 MG/ML
0.02 VIAL (ML) INJECTION
Status: DISCONTINUED | OUTPATIENT
Start: 2022-08-19 | End: 2022-08-30 | Stop reason: HOSPADM

## 2022-08-18 RX ORDER — ATORVASTATIN CALCIUM 40 MG/1
80 TABLET, FILM COATED ORAL DAILY
Status: DISCONTINUED | OUTPATIENT
Start: 2022-08-19 | End: 2022-08-30 | Stop reason: HOSPADM

## 2022-08-18 RX ORDER — ONDANSETRON 2 MG/ML
4 INJECTION INTRAMUSCULAR; INTRAVENOUS EVERY 8 HOURS PRN
Status: DISCONTINUED | OUTPATIENT
Start: 2022-08-19 | End: 2022-08-19

## 2022-08-18 RX ORDER — CLOPIDOGREL BISULFATE 75 MG/1
75 TABLET ORAL DAILY
Status: DISCONTINUED | OUTPATIENT
Start: 2022-08-19 | End: 2022-08-30 | Stop reason: HOSPADM

## 2022-08-18 RX ADMIN — PIPERACILLIN SODIUM AND TAZOBACTAM SODIUM 3.38 G: 3; .375 INJECTION, POWDER, LYOPHILIZED, FOR SOLUTION INTRAVENOUS at 05:08

## 2022-08-18 RX ADMIN — DIAZEPAM 2.5 MG: 5 INJECTION, SOLUTION INTRAMUSCULAR; INTRAVENOUS at 08:08

## 2022-08-18 RX ADMIN — ONDANSETRON 4 MG: 2 INJECTION INTRAMUSCULAR; INTRAVENOUS at 06:08

## 2022-08-18 RX ADMIN — PROMETHAZINE HYDROCHLORIDE 6.25 MG: 25 INJECTION INTRAMUSCULAR; INTRAVENOUS at 07:08

## 2022-08-18 RX ADMIN — FAMOTIDINE 20 MG: 10 INJECTION, SOLUTION INTRAVENOUS at 09:08

## 2022-08-18 RX ADMIN — HYDRALAZINE HYDROCHLORIDE 10 MG: 20 INJECTION INTRAMUSCULAR; INTRAVENOUS at 11:08

## 2022-08-18 RX ADMIN — ASPIRIN 81 MG 324 MG: 81 TABLET ORAL at 07:08

## 2022-08-18 RX ADMIN — METOCLOPRAMIDE 10 MG: 5 INJECTION, SOLUTION INTRAMUSCULAR; INTRAVENOUS at 09:08

## 2022-08-18 NOTE — FIRST PROVIDER EVALUATION
"Medical screening exam completed.  I have conducted a focused provider triage encounter, findings are as follows:    Brief history of present illness:  58 yo diabetic F presenting for evaluation of foot wound.    Vitals:    08/18/22 1344   BP: (!) 169/97   BP Location: Left arm   Patient Position: Sitting   Pulse: 84   Resp: 17   Temp: 98.1 °F (36.7 °C)   TempSrc: Oral   SpO2: 98%   Weight: 77.1 kg (170 lb)   Height: 5' 8" (1.727 m)       Pertinent physical exam:  R foot ulcer    Brief workup plan:  Labs, imaging    Preliminary workup initiated; this workup will be continued and followed by the physician or advanced practice provider that is assigned to the patient when roomed.  "

## 2022-08-18 NOTE — Clinical Note
The left brachial was prepped. The site was prepped with ChloraPrep. The site was clipped. The patient was draped. The patient was positioned supine. The patient was secured using safety straps.

## 2022-08-18 NOTE — ED PROVIDER NOTES
"Encounter Date: 8/18/2022       History     Chief Complaint   Patient presents with    Wound Infection     Diabetic pt with a right foot wound that looks to be stage II with mild redness. Pt is concerned and wanted to get it looked at "before it got too bad". Pt currently is being seen by woundcare for her other foot, presented with a wound vac.      Patient with history of diabetes.  She is significant left foot diabetic ulcer.  She is under treatment with Podiatry/wound care.  She currently has a wound VAC to left foot.  She took her sock off today and noticed some alteration to her right foot.  No fever chills.  No trauma.  Patient has appointment with podiatrist in the morning.  Patient does have end-stage renal disease with last dialysis yesterday.        Review of patient's allergies indicates:   Allergen Reactions    Dilaudid [hydromorphone] Nausea And Vomiting    Vancomycin Itching    Chlorhexidine Itching    Lortab [hydrocodone-acetaminophen] Itching     Past Medical History:   Diagnosis Date    A-fib     resolved per patient    Anemia due to end stage renal disease 6/30/2022    Arthritis     Bronchitis     Cardiovascular event risk, ASCVD 10-year risk 6.7% 10/15/2016    Diabetes mellitus type II     Diabetic foot infection     Diabetic ulcer of left midfoot 05/05/2022    Disorder of kidney and ureter     Encounter for blood transfusion     ESRD (end stage renal disease) 05/18/2018    MANJINDER (generalized anxiety disorder) 10/25/2016    History of colon polyps 11/02/2016    Hyperlipidemia     Hypertension     Stroke      Past Surgical History:   Procedure Laterality Date    COLONOSCOPY N/A 10/5/2016    Procedure: COLONOSCOPY;  Surgeon: Pillo Chanel MD;  Location: [facility] END[facility];  Service: Endoscopy;  Laterality: N/A;    COLONOSCOPY N/A 4/26/2022    Procedure: COLONOSCOPY;  Surgeon: Saravanan Rachel MD;  Location: [facility] ENDO;  Service: Endoscopy;  Laterality: N/A;    HERNIA REPAIR " Bilateral 11/22/2016    inguinal    HYSTERECTOMY      INCISION AND DRAINAGE FOOT Left 5/13/2022    Procedure: INCISION AND DRAINAGE, FOOT;  Surgeon: Ricardo Bruno DPM;  Location[facility];  Service: Podiatry;  Laterality: Left;    OOPHORECTOMY       Family History   Problem Relation Age of Onset    Hyperlipidemia Mother     Hypertension Mother     Hypertension Father     Diabetes Father     Diabetes Sister     Diabetes Sister     Diabetes Maternal Aunt     Diabetes Maternal Grandmother     Heart disease Maternal Grandmother     Cancer Paternal Grandmother     Breast cancer Neg Hx     Colon cancer Neg Hx     Ovarian cancer Neg Hx      Social History     Tobacco Use    Smoking status: Never Smoker    Smokeless tobacco: Never Used   Substance Use Topics    Alcohol use: No    Drug use: No     Review of Systems   Constitutional: Negative for chills and fever.   HENT: Negative for congestion.    Eyes: Negative for visual disturbance.   Respiratory: Negative for shortness of breath.    Cardiovascular: Negative for chest pain and palpitations.   Gastrointestinal: Positive for nausea and vomiting. Negative for abdominal pain.   Musculoskeletal: Negative for joint swelling.   Skin: Positive for wound.   Neurological: Negative for headaches.   Psychiatric/Behavioral: Negative for confusion.       Physical Exam     Initial Vitals [08/18/22 1344]   BP Pulse Resp Temp SpO2   (!) 169/97 84 17 98.1 °F (36.7 °C) 98 %      MAP       --         Physical Exam    Nursing note and vitals reviewed.  Constitutional: She is not diaphoretic. No distress.   HENT:   Head: Normocephalic and atraumatic.   Eyes: Conjunctivae are normal.   Neck:   Normal range of motion.  Cardiovascular: Normal rate.   Pulmonary/Chest: Breath sounds normal.   Abdominal: Abdomen is soft. There is no abdominal tenderness.   Musculoskeletal:      Cervical back: Normal range of motion.      Comments: Left foot with wound VAC in place in bandages.   Right foot has some superficial ulcerations to 1st toe medial lateral foot.  There is slight erythema to superficial ulceration to lateral foot.  No drainage.     Neurological: She is alert.   No gross deficits   Skin: No rash noted.   Psychiatric: She has a normal mood and affect.         ED Course   Procedures  Labs Reviewed   CBC W/ AUTO DIFFERENTIAL - Abnormal; Notable for the following components:       Result Value    Hemoglobin 11.5 (*)     MCH 26.5 (*)     MCHC 30.6 (*)     RDW 15.4 (*)     Immature Grans (Abs) 0.05 (*)     Lymph % 17.7 (*)     All other components within normal limits   COMPREHENSIVE METABOLIC PANEL - Abnormal; Notable for the following components:    Sodium 135 (*)     Glucose 233 (*)     BUN 54 (*)     Creatinine 6.2 (*)     Total Protein 8.6 (*)     eGFR 7.4 (*)     All other components within normal limits   POCT GLUCOSE - Abnormal; Notable for the following components:    POC Glucose 237 (*)     All other components within normal limits   POCT GLUCOSE - Abnormal; Notable for the following components:    POC Glucose 255 (*)     All other components within normal limits   TROPONIN I   SARS-COV-2 RNA AMPLIFICATION, QUAL   LIPASE   TROPONIN I   LIPASE   POCT GLUCOSE MONITORING CONTINUOUS   POCT GLUCOSE MONITORING CONTINUOUS          Imaging Results          CT Abdomen Pelvis  Without Contrast (In process)                X-Ray Chest AP Portable (Final result)  Result time 08/18/22 19:36:45    Final result by Denise Estrada MD (08/18/22 19:36:45)                 Narrative:    XR CHEST 1 VIEW    CLINICAL HISTORY:  57 years Female Chest pain    COMPARISON: 5/4/2022    FINDINGS: Cardiomediastinal silhouette is within normal limits. Lungs are normally expanded with no airspace consolidation. No pleural effusion or pneumothorax. No acute osseous abnormality.    IMPRESSION: No acute pulmonary process.    Electronically signed by:  Denise Estrada MD  8/18/2022 7:36 PM CDT Workstation:  [id number]                             X-Ray Foot Complete Right (Final result)  Result time 08/18/22 15:18:22    Final result by Kiel Garvey MD (08/18/22 15:18:22)                 Narrative:    Reason: Diabetic wound.    FINDINGS:    Three views of the right foot . No acute fracture or dislocation. Claw toe deformities are noted. There are degenerative changes of the interphalangeal joints of the foot with questionable erosions of the third PIP joint. Dorsal mid foot degenerative changes observed. Achilles and plantar calcaneal enthesopathy noted. Vascular atherosclerosis noted.    IMPRESSION:    Degenerative changes of the foot with no acute osseous abnormality observed.    Electronically signed by:  Kiel Garvey DO  8/18/2022 3:18 PM CDT Workstation: [id number]                               Medications   famotidine (PF) injection 20 mg (has no administration in time range)   piperacillin-tazobactam 3.375 g in dextrose 5 % 50 mL IVPB (ready to mix system) (0 g Intravenous Stopped 8/18/22 1800)   ondansetron injection 4 mg (4 mg Intravenous Given 8/18/22 1843)   aspirin chewable tablet 324 mg (324 mg Oral Given 8/18/22 1941)   promethazine (PHENERGAN) 6.25 mg in dextrose 5 % 50 mL IVPB (0 mg Intravenous Stopped 8/18/22 2000)   diazePAM injection 2.5 mg (2.5 mg Intravenous Given 8/18/22 2038)     Medical Decision Making:   History:   Old Medical Records: I decided to obtain old medical records.  Clinical Tests:   Lab Tests: Reviewed  Radiological Study: Reviewed  ED Management:  Patient presents with some superficial ulceration to right foot.  Patient has close follow-up with Podiatry in the morning.  Patient with possible area of superficial cellulitis to 1 area versus erythema from pressure.  Will begin antibiotics.  Right foot is neurovascular intact with capillary refill less than 2 seconds all toes.  Full range of motion with no pain to right foot.  Patient states she ate a salad rapidly and had 1  episode of emesis while waiting to see provider.  Patient and additional episode of emesis prior to discharge.  Patient then developed chest pressure.  EKG with no ST elevation.  Currently troponin is unremarkable.  Repeat Accu-Chek remains somewhat elevated.  Will give aspirin.  Treat nausea vomiting.  Check CT of the abdomen pelvis.  Lipase is normal.  Hospitalist consulted for admission.                      Clinical Impression:   Final diagnoses:  [R52] Pain  [E11.621, L97.411] Diabetic ulcer of right midfoot associated with type 2 diabetes mellitus, limited to breakdown of skin (Primary)  [R07.9] Chest pain          ED Disposition Condition    Admit               Robel Mancilla MD  08/18/22 1830       Robel Mancilla MD  08/18/22 2023       Robel Mancilla MD  08/18/22 2122

## 2022-08-19 LAB
ALBUMIN SERPL BCP-MCNC: 3.5 G/DL (ref 3.5–5.2)
ALP SERPL-CCNC: 66 U/L (ref 55–135)
ALT SERPL W/O P-5'-P-CCNC: 22 U/L (ref 10–44)
ANION GAP SERPL CALC-SCNC: 18 MMOL/L (ref 8–16)
AST SERPL-CCNC: 14 U/L (ref 10–40)
BASOPHILS # BLD AUTO: 0.04 K/UL (ref 0–0.2)
BASOPHILS NFR BLD: 0.4 % (ref 0–1.9)
BILIRUB SERPL-MCNC: 0.9 MG/DL (ref 0.1–1)
BNP SERPL-MCNC: 351 PG/ML (ref 0–99)
BUN SERPL-MCNC: 64 MG/DL (ref 6–20)
CALCIUM SERPL-MCNC: 9.3 MG/DL (ref 8.7–10.5)
CHLORIDE SERPL-SCNC: 97 MMOL/L (ref 95–110)
CO2 SERPL-SCNC: 19 MMOL/L (ref 23–29)
CREAT SERPL-MCNC: 7.1 MG/DL (ref 0.5–1.4)
DIFFERENTIAL METHOD: ABNORMAL
EOSINOPHIL # BLD AUTO: 0 K/UL (ref 0–0.5)
EOSINOPHIL NFR BLD: 0.1 % (ref 0–8)
ERYTHROCYTE [DISTWIDTH] IN BLOOD BY AUTOMATED COUNT: 15.3 % (ref 11.5–14.5)
EST. GFR  (NO RACE VARIABLE): 6.3 ML/MIN/1.73 M^2
GLUCOSE SERPL-MCNC: 145 MG/DL (ref 70–110)
GLUCOSE SERPL-MCNC: 256 MG/DL (ref 70–110)
GLUCOSE SERPL-MCNC: 339 MG/DL (ref 70–110)
GLUCOSE SERPL-MCNC: 354 MG/DL (ref 70–110)
GLUCOSE SERPL-MCNC: 365 MG/DL (ref 70–110)
HCT VFR BLD AUTO: 31.5 % (ref 37–48.5)
HGB BLD-MCNC: 9.8 G/DL (ref 12–16)
IMM GRANULOCYTES # BLD AUTO: 0.13 K/UL (ref 0–0.04)
IMM GRANULOCYTES NFR BLD AUTO: 1.2 % (ref 0–0.5)
LYMPHOCYTES # BLD AUTO: 1.2 K/UL (ref 1–4.8)
LYMPHOCYTES NFR BLD: 10.2 % (ref 18–48)
MAGNESIUM SERPL-MCNC: 2.2 MG/DL (ref 1.6–2.6)
MCH RBC QN AUTO: 26.9 PG (ref 27–31)
MCHC RBC AUTO-ENTMCNC: 31.1 G/DL (ref 32–36)
MCV RBC AUTO: 87 FL (ref 82–98)
MONOCYTES # BLD AUTO: 0.4 K/UL (ref 0.3–1)
MONOCYTES NFR BLD: 3.6 % (ref 4–15)
NEUTROPHILS # BLD AUTO: 9.5 K/UL (ref 1.8–7.7)
NEUTROPHILS NFR BLD: 84.5 % (ref 38–73)
NRBC BLD-RTO: 0 /100 WBC
PHOSPHATE SERPL-MCNC: 5.6 MG/DL (ref 2.7–4.5)
PLATELET # BLD AUTO: 301 K/UL (ref 150–450)
PMV BLD AUTO: 10.3 FL (ref 9.2–12.9)
POTASSIUM SERPL-SCNC: 4.9 MMOL/L (ref 3.5–5.1)
PROT SERPL-MCNC: 7.7 G/DL (ref 6–8.4)
RBC # BLD AUTO: 3.64 M/UL (ref 4–5.4)
SODIUM SERPL-SCNC: 134 MMOL/L (ref 136–145)
TROPONIN I SERPL DL<=0.01 NG/ML-MCNC: 0.06 NG/ML
TROPONIN I SERPL DL<=0.01 NG/ML-MCNC: 0.07 NG/ML
WBC # BLD AUTO: 11.22 K/UL (ref 3.9–12.7)

## 2022-08-19 PROCEDURE — C9399 UNCLASSIFIED DRUGS OR BIOLOG: HCPCS | Performed by: NURSE PRACTITIONER

## 2022-08-19 PROCEDURE — 96372 THER/PROPH/DIAG INJ SC/IM: CPT | Performed by: NURSE PRACTITIONER

## 2022-08-19 PROCEDURE — 84100 ASSAY OF PHOSPHORUS: CPT | Performed by: NURSE PRACTITIONER

## 2022-08-19 PROCEDURE — 25000003 PHARM REV CODE 250: Performed by: NURSE PRACTITIONER

## 2022-08-19 PROCEDURE — 11046 DBRDMT MUSC&/FSCA EA ADDL: CPT | Mod: ,,, | Performed by: PODIATRIST

## 2022-08-19 PROCEDURE — 99214 PR OFFICE/OUTPT VISIT, EST, LEVL IV, 30-39 MIN: ICD-10-PCS | Mod: 25,,, | Performed by: PODIATRIST

## 2022-08-19 PROCEDURE — G0378 HOSPITAL OBSERVATION PER HR: HCPCS

## 2022-08-19 PROCEDURE — 11042 DEBRIDEMENT: ICD-10-PCS | Mod: 59,,, | Performed by: PODIATRIST

## 2022-08-19 PROCEDURE — 11046 DEBRIDEMENT: ICD-10-PCS | Mod: ,,, | Performed by: PODIATRIST

## 2022-08-19 PROCEDURE — 84484 ASSAY OF TROPONIN QUANT: CPT | Performed by: NURSE PRACTITIONER

## 2022-08-19 PROCEDURE — 83735 ASSAY OF MAGNESIUM: CPT | Performed by: NURSE PRACTITIONER

## 2022-08-19 PROCEDURE — 11043 DBRDMT MUSC&/FSCA 1ST 20/<: CPT | Mod: ,,, | Performed by: PODIATRIST

## 2022-08-19 PROCEDURE — 11043 DEBRIDEMENT: ICD-10-PCS | Mod: ,,, | Performed by: PODIATRIST

## 2022-08-19 PROCEDURE — 99214 OFFICE O/P EST MOD 30 MIN: CPT | Mod: 25,,, | Performed by: PODIATRIST

## 2022-08-19 PROCEDURE — 63600175 PHARM REV CODE 636 W HCPCS: Performed by: INTERNAL MEDICINE

## 2022-08-19 PROCEDURE — 80053 COMPREHEN METABOLIC PANEL: CPT | Performed by: NURSE PRACTITIONER

## 2022-08-19 PROCEDURE — 96375 TX/PRO/DX INJ NEW DRUG ADDON: CPT

## 2022-08-19 PROCEDURE — 85025 COMPLETE CBC W/AUTO DIFF WBC: CPT | Performed by: NURSE PRACTITIONER

## 2022-08-19 PROCEDURE — 63600175 PHARM REV CODE 636 W HCPCS: Performed by: NURSE PRACTITIONER

## 2022-08-19 PROCEDURE — 90935 HEMODIALYSIS ONE EVALUATION: CPT

## 2022-08-19 PROCEDURE — 11042 DBRDMT SUBQ TIS 1ST 20SQCM/<: CPT | Mod: 59,,, | Performed by: PODIATRIST

## 2022-08-19 PROCEDURE — 82962 GLUCOSE BLOOD TEST: CPT

## 2022-08-19 PROCEDURE — 96376 TX/PRO/DX INJ SAME DRUG ADON: CPT

## 2022-08-19 RX ORDER — INSULIN ASPART 100 [IU]/ML
0-5 INJECTION, SOLUTION INTRAVENOUS; SUBCUTANEOUS EVERY 6 HOURS PRN
Status: DISCONTINUED | OUTPATIENT
Start: 2022-08-19 | End: 2022-08-30 | Stop reason: HOSPADM

## 2022-08-19 RX ORDER — METOCLOPRAMIDE HYDROCHLORIDE 5 MG/ML
5 INJECTION INTRAMUSCULAR; INTRAVENOUS EVERY 6 HOURS
Status: DISCONTINUED | OUTPATIENT
Start: 2022-08-19 | End: 2022-08-21

## 2022-08-19 RX ORDER — GLUCAGON 1 MG
1 KIT INJECTION
Status: DISCONTINUED | OUTPATIENT
Start: 2022-08-19 | End: 2022-08-19

## 2022-08-19 RX ORDER — ONDANSETRON 2 MG/ML
4 INJECTION INTRAMUSCULAR; INTRAVENOUS EVERY 6 HOURS PRN
Status: DISCONTINUED | OUTPATIENT
Start: 2022-08-19 | End: 2022-08-24

## 2022-08-19 RX ORDER — PROCHLORPERAZINE EDISYLATE 5 MG/ML
2.5 INJECTION INTRAMUSCULAR; INTRAVENOUS ONCE
Status: COMPLETED | OUTPATIENT
Start: 2022-08-19 | End: 2022-08-19

## 2022-08-19 RX ORDER — SODIUM CHLORIDE 9 MG/ML
INJECTION, SOLUTION INTRAVENOUS ONCE
Status: DISCONTINUED | OUTPATIENT
Start: 2022-08-19 | End: 2022-08-21

## 2022-08-19 RX ADMIN — ONDANSETRON 4 MG: 2 INJECTION INTRAMUSCULAR; INTRAVENOUS at 12:08

## 2022-08-19 RX ADMIN — HEPARIN SODIUM 5000 UNITS: 5000 INJECTION INTRAVENOUS; SUBCUTANEOUS at 09:08

## 2022-08-19 RX ADMIN — HEPARIN SODIUM 5000 UNITS: 5000 INJECTION INTRAVENOUS; SUBCUTANEOUS at 05:08

## 2022-08-19 RX ADMIN — METOCLOPRAMIDE 5 MG: 5 INJECTION, SOLUTION INTRAMUSCULAR; INTRAVENOUS at 12:08

## 2022-08-19 RX ADMIN — HEPARIN SODIUM 5000 UNITS: 5000 INJECTION INTRAVENOUS; SUBCUTANEOUS at 03:08

## 2022-08-19 RX ADMIN — CLOPIDOGREL BISULFATE 75 MG: 75 TABLET, FILM COATED ORAL at 12:08

## 2022-08-19 RX ADMIN — ONDANSETRON 4 MG: 2 INJECTION INTRAMUSCULAR; INTRAVENOUS at 02:08

## 2022-08-19 RX ADMIN — HYDRALAZINE HYDROCHLORIDE 50 MG: 25 TABLET ORAL at 09:08

## 2022-08-19 RX ADMIN — PROCHLORPERAZINE EDISYLATE 2.5 MG: 5 INJECTION INTRAMUSCULAR; INTRAVENOUS at 01:08

## 2022-08-19 RX ADMIN — HYDRALAZINE HYDROCHLORIDE 10 MG: 20 INJECTION INTRAMUSCULAR; INTRAVENOUS at 07:08

## 2022-08-19 RX ADMIN — CALCIUM ACETATE 667 MG: 667 CAPSULE ORAL at 07:08

## 2022-08-19 RX ADMIN — HYDRALAZINE HYDROCHLORIDE 50 MG: 25 TABLET ORAL at 05:08

## 2022-08-19 RX ADMIN — INSULIN ASPART 3 UNITS: 100 INJECTION, SOLUTION INTRAVENOUS; SUBCUTANEOUS at 01:08

## 2022-08-19 RX ADMIN — INSULIN ASPART 3 UNITS: 100 INJECTION, SOLUTION INTRAVENOUS; SUBCUTANEOUS at 12:08

## 2022-08-19 RX ADMIN — INSULIN DETEMIR 8 UNITS: 100 INJECTION, SOLUTION SUBCUTANEOUS at 08:08

## 2022-08-19 RX ADMIN — ASPIRIN 81 MG: 81 TABLET, COATED ORAL at 12:08

## 2022-08-19 RX ADMIN — CALCIUM ACETATE 667 MG: 667 CAPSULE ORAL at 12:08

## 2022-08-19 RX ADMIN — METOCLOPRAMIDE 10 MG: 5 INJECTION, SOLUTION INTRAMUSCULAR; INTRAVENOUS at 05:08

## 2022-08-19 RX ADMIN — SENNOSIDES AND DOCUSATE SODIUM 1 TABLET: 50; 8.6 TABLET ORAL at 01:08

## 2022-08-19 RX ADMIN — ATORVASTATIN CALCIUM 80 MG: 40 TABLET, FILM COATED ORAL at 12:08

## 2022-08-19 NOTE — ASSESSMENT & PLAN NOTE
Bedside debridement-  See procedure note.    Continue wound VAC twice weekly to left mid foot wound and left great toe wound.    Imperative patient continues to float heels at all times- left heel eschar remains stable     Recommend course of IV antibiotics while patient is in the hospital for left great toe wound.  This wound has worsened since I last saw her and has increased erythema - will consult Infectious Disease to obtain their recommendations     Left midfoot wound has improved.      vicky and glucerna

## 2022-08-19 NOTE — CONSULTS
"[facility]  Podiatry  Consult Note    Patient Name: Leanna García  MRN: [id number]  Admission Date: 8/18/2022  Hospital Length of Stay: 0 days  Attending Physician: Andrea Montoya MD  Primary Care Provider: Stefano Lopez MD     Consults  Subjective:     History of Present Illness:    Leanna García is a 57 y.o. female with a history as  has a past medical history of A-fib, Anemia due to end stage renal disease (6/30/2022), Arthritis, Bronchitis, Cardiovascular event risk, ASCVD 10-year risk 6.7% (10/15/2016), Diabetes mellitus type II, Diabetic foot infection, Diabetic ulcer of left midfoot (05/05/2022), Disorder of kidney and ureter, Encounter for blood transfusion, ESRD (end stage renal disease) (05/18/2018), MANJINDER (generalized anxiety disorder) (10/25/2016), History of colon polyps (11/02/2016), Hyperlipidemia, Hypertension, and Stroke.       Patient is known to me from previous hospital admissions.  I have not seen patient in several weeks.  She failed to follow up outpatient in wound clinic once discharged from the [facility].  She states that she had "a lot going on" with other medical issues she is dealing with and did not get around coming to a wound clinic appointment.  Patient is trying to get better and has been compliant with floating heels at home.  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  Patient's left great toe wound has worsened since I saw her last.    Patient presented to the hospital due to concerns of new right foot wounds.        Scheduled Meds:   amLODIPine  10 mg Oral Daily    aspirin  81 mg Oral Daily    atorvastatin  80 mg Oral Daily    calcium acetate(phosphat bind)  667 mg Oral TID WM    clopidogreL  75 mg Oral Daily    epoetin chris-epbx  4,400 Units Subcutaneous Every Mon, Wed, Fri    heparin (porcine)  5,000 Units Subcutaneous Q8H    hydrALAZINE  50 mg Oral Q8H    insulin detemir U-100  8 Units Subcutaneous " QHS    losartan  100 mg Oral Daily    metoclopramide HCl  5 mg Intravenous Q6H     Continuous Infusions:  PRN Meds:dextrose 10%, dextrose 10%, glucagon (human recombinant), glucose, glucose, hydrALAZINE, insulin aspart U-100, naloxone, ondansetron, senna-docusate 8.6-50 mg, sodium chloride 0.9%    Review of patient's allergies indicates:   Allergen Reactions    Dilaudid [hydromorphone] Nausea And Vomiting    Vancomycin Itching    Chlorhexidine Itching    Lortab [hydrocodone-acetaminophen] Itching        Past Medical History:   Diagnosis Date    A-fib     resolved per patient    Anemia due to end stage renal disease 6/30/2022    Arthritis     Bronchitis     Cardiovascular event risk, ASCVD 10-year risk 6.7% 10/15/2016    Diabetes mellitus type II     Diabetic foot infection     Diabetic ulcer of left midfoot 05/05/2022    Disorder of kidney and ureter     Encounter for blood transfusion     ESRD (end stage renal disease) 05/18/2018    MANJINDER (generalized anxiety disorder) 10/25/2016    History of colon polyps 11/02/2016    Hyperlipidemia     Hypertension     Stroke      Past Surgical History:   Procedure Laterality Date    COLONOSCOPY N/A 10/5/2016    Procedure: COLONOSCOPY;  Surgeon: Pillo Chanel MD;  Location: [facility];  Service: Endoscopy;  Laterality: N/A;    COLONOSCOPY N/A 4/26/2022    Procedure: COLONOSCOPY;  Surgeon: Saravanan Rachel MD;  Location: [facility];  Service: Endoscopy;  Laterality: N/A;    HERNIA REPAIR Bilateral 11/22/2016    inguinal    HYSTERECTOMY      INCISION AND DRAINAGE FOOT Left 5/13/2022    Procedure: INCISION AND DRAINAGE, FOOT;  Surgeon: Ricardo Bruno DPM;  [facility];  Service: Podiatry;  Laterality: Left;    OOPHORECTOMY         Family History       Problem Relation (Age of Onset)    Cancer Paternal Grandmother    Diabetes Father, Sister, Sister, Maternal Aunt, Maternal Grandmother    Heart disease Maternal Grandmother    Hyperlipidemia Mother     Hypertension Mother, Father          Tobacco Use    Smoking status: Never Smoker    Smokeless tobacco: Never Used   Substance and Sexual Activity    Alcohol use: No    Drug use: No    Sexual activity: Yes     Partners: Male     Review of Systems  Objective:     Vital Signs (Most Recent):  Temp: 98.7 °F (37.1 °C) (08/19/22 0719)  Pulse: 89 (08/19/22 0719)  Resp: 18 (08/19/22 0719)  BP: (!) 185/118 (08/19/22 0719)  SpO2: (!) 94 % (08/19/22 0719)   Vital Signs (24h Range):  Temp:  [98.1 °F (36.7 °C)-98.8 °F (37.1 °C)] 98.7 °F (37.1 °C)  Pulse:  [79-94] 89  Resp:  [17-39] 18  SpO2:  [94 %-99 %] 94 %  BP: (157-212)/() 185/118     Weight: 83.9 kg (184 lb 15.5 oz)  Body mass index is 27.31 kg/m².    Foot Exam    Right Foot/Ankle     Neurovascular  Dorsalis pedis: doppler  Posterior tibial: doppler  Saphenous nerve sensation: absent  Tibial nerve sensation: absent  Superficial peroneal nerve sensation: absent  Deep peroneal nerve sensation: absent  Sural nerve sensation: absent      Left Foot/Ankle      Neurovascular  Dorsalis pedis: doppler  Posterior tibial: doppler  Saphenous nerve sensation: absent  Tibial nerve sensation: absent  Superficial peroneal nerve sensation: absent  Deep peroneal nerve sensation: absent  Sural nerve sensation: absent                Post debridement of left great toe ulcer picture below    Post debridement of left plantar foot ulcer picture below          Laboratory:  All pertinent labs reviewed within the last 24 hours.    Diagnostic Results:  I have reviewed all pertinent imaging results/findings within the past 24 hours.      Assessment/Plan:     Ulcer of left foot with necrosis of muscle  Bedside debridement-  See procedure note.    Continue wound VAC twice weekly to left mid foot wound and left great toe wound.    Imperative patient continues to float heels at all times- left heel eschar remains stable     Recommend course of IV antibiotics while patient is in the hospital for  left great toe wound.  This wound has worsened since I last saw her and has increased erythema - will consult Infectious Disease to obtain their recommendations     Left midfoot wound has improved.      vicky and cecilia        Thank you for your consult. I will follow-up with patient. Please contact us if you have any additional questions.    Alivia Bruno DPM  Podiatry  [facility]

## 2022-08-19 NOTE — PROGRESS NOTES
[facility]  Progress Note    Patient name: Leanna García  MRN: [id number]  Admit Date: 8/18/2022   LOS: 0 days     SUBJECTIVE:     Principal problem: Diabetic ulcer of right midfoot associated with type 2 diabetes mellitus    Interval History:  Still having intermittent nausea.  History supplemented by patient's .  Patient was brought to the ED by has been for right foot wound.  He denies fever or chills.    Scheduled Meds:   amLODIPine  10 mg Oral Daily    aspirin  81 mg Oral Daily    atorvastatin  80 mg Oral Daily    calcium acetate(phosphat bind)  667 mg Oral TID WM    clopidogreL  75 mg Oral Daily    epoetin chris-epbx  4,400 Units Subcutaneous Every Mon, Wed, Fri    heparin (porcine)  5,000 Units Subcutaneous Q8H    hydrALAZINE  50 mg Oral Q8H    insulin detemir U-100  8 Units Subcutaneous QHS    losartan  100 mg Oral Daily    metoclopramide HCl  5 mg Intravenous Q6H     Continuous Infusions:  PRN Meds:dextrose 10%, dextrose 10%, glucagon (human recombinant), glucose, glucose, hydrALAZINE, insulin aspart U-100, naloxone, ondansetron, senna-docusate 8.6-50 mg, sodium chloride 0.9%    Review of patient's allergies indicates:   Allergen Reactions    Dilaudid [hydromorphone] Nausea And Vomiting    Vancomycin Itching    Chlorhexidine Itching    Lortab [hydrocodone-acetaminophen] Itching       Review of Systems: As per interval history    OBJECTIVE:     Vital Signs (Most Recent)  Temp: 98 °F (36.7 °C) (08/19/22 1146)  Pulse: 84 (08/19/22 1146)  Resp: 18 (08/19/22 1146)  BP: (!) 123/58 (08/19/22 1146)  SpO2: (!) 93 % (08/19/22 1146)    Vital Signs Range (Last 24H):  Temp:  [98 °F (36.7 °C)-98.8 °F (37.1 °C)]   Pulse:  [79-94]   Resp:  [18-39]   BP: (123-212)/()   SpO2:  [93 %-99 %]     I & O (Last 24H):    Intake/Output Summary (Last 24 hours) at 8/19/2022 1441  Last data filed at 8/19/2022 0800  Gross per 24 hour   Intake 50 ml   Output --   Net 50 ml        Physical Exam:  General: Patient resting comfortably in no acute distress. Appears as stated age. Calm  Eyes: No conjunctival injection. No scleral icterus.  ENT: Hearing grossly intact. No discharge from ears. No nasal discharge.   Neck: Supple, trachea midline. No JVD  CVS: RRR. No LE edema BL  Lungs:  No tachypnea or accessory muscle use.  Clear to auscultation bilaterally  Abdomen:  Soft, nontender and nondistended.  No organomegaly  Neuro:  Alert.  Follows commands.  Skin:  Left foot dressing noted with minimal blood.  Right foot has small left lateral healed wound    Laboratory:  I have reviewed all pertinent lab results within the past 24 hours.  CBC:   Recent Labs   Lab 08/19/22  0649   WBC 11.22   RBC 3.64*   HGB 9.8*   HCT 31.5*      MCV 87   MCH 26.9*   MCHC 31.1*     BMP:   Recent Labs   Lab 08/19/22  0649   *   *   K 4.9   CL 97   CO2 19*   BUN 64*   CREATININE 7.1*   CALCIUM 9.3   MG 2.2       Diagnostic Results:  Labs: Reviewed    ASSESSMENT/PLAN:     Active Hospital Problems    Diagnosis  POA    *Diabetic ulcer of right midfoot associated with type 2 diabetes mellitus [E11.621, L97.419]  Yes    Gastroparesis [K31.84]  Yes    Slow transit constipation [K59.01]  Yes    Ulcer of left foot with necrosis of muscle [L97.523]  Yes    PAD (peripheral artery disease) [I73.9]  Yes    Type 2 diabetes mellitus with kidney complication, with long-term current use of insulin [E11.29, Z79.4]  Not Applicable    ESRD (end stage renal disease) [N18.6]  Yes    Hyperlipemia [E78.5]  Yes    Hypertension associated with diabetes [E11.59, I15.2]  Yes     Chronic      Resolved Hospital Problems    Diagnosis Date Resolved POA    Chest pain [R07.9] 08/19/2022 Yes         Plan:   Diabetic left foot ulcer with muscle necrosis - recently discharged from [facility] about 1 week ago; treated with daptomycin and ampicillin-sulbactam for 4 weeks with end date of 07/19/2022  Seen by Podiatry here  underwent bedside debridement.  Continue wound VAC as per Podiatry recommendations  Infectious disease consult has per Podiatry recommendations  Offload with floating heels  Intractable nausea and vomiting - conservative management with antiemetics  ESRD on HD.  Nephrology consult  Type 2 DM.  Basal insulin along with low-dose insulin sliding scale    VTE Risk Mitigation (From admission, onward)         Ordered     heparin (porcine) injection 5,000 Units  Every 8 hours         08/18/22 2311     IP VTE HIGH RISK PATIENT  Once         08/18/22 2311     Place sequential compression device  Until discontinued         08/18/22 2311                    Department Hospital Medicine  [facility]  Andrea Montoya MD  Date of service: 08/19/2022

## 2022-08-19 NOTE — ASSESSMENT & PLAN NOTE
Patient with nausea and vomiting (undigested food). Given Zofran without resolution. Stared on reglan as ordered.

## 2022-08-19 NOTE — PLAN OF CARE
Problem: Adult Inpatient Plan of Care  Goal: Plan of Care Review  Outcome: Ongoing, Not Progressing  Goal: Patient-Specific Goal (Individualized)  Outcome: Ongoing, Not Progressing  Goal: Absence of Hospital-Acquired Illness or Injury  Outcome: Ongoing, Not Progressing  Goal: Optimal Comfort and Wellbeing  Outcome: Ongoing, Not Progressing  Goal: Readiness for Transition of Care  Outcome: Ongoing, Not Progressing     Problem: Diabetes Comorbidity  Goal: Blood Glucose Level Within Targeted Range  Outcome: Ongoing, Not Progressing     Problem: Infection  Goal: Absence of Infection Signs and Symptoms  Outcome: Ongoing, Not Progressing     Problem: Impaired Wound Healing  Goal: Optimal Wound Healing  Outcome: Ongoing, Not Progressing     Problem: Skin Injury Risk Increased  Goal: Skin Health and Integrity  Outcome: Ongoing, Not Progressing     Problem: Device-Related Complication Risk (Hemodialysis)  Goal: Safe, Effective Therapy Delivery  Outcome: Ongoing, Not Progressing     Problem: Hemodynamic Instability (Hemodialysis)  Goal: Effective Tissue Perfusion  Outcome: Ongoing, Not Progressing     Problem: Infection (Hemodialysis)  Goal: Absence of Infection Signs and Symptoms  Outcome: Ongoing, Not Progressing

## 2022-08-19 NOTE — ASSESSMENT & PLAN NOTE
Patient's FSGs are uncontrolled due to hyperglycemia on current medication regimen.  Last A1c reviewed-   Lab Results   Component Value Date    LABA1C 10.3 (H) 09/11/2015    HGBA1C 7.5 (H) 06/22/2022     Most recent fingerstick glucose reviewed- No results for input(s): POCTGLUCOSE in the last 24 hours.  Current correctional scale  Low  Maintain anti-hyperglycemic dose as follows-   Antihyperglycemics (From admission, onward)            Start     Stop Route Frequency Ordered    08/19/22 2100  insulin detemir U-100 pen 8 Units         -- SubQ Nightly 08/18/22 2319        Will provide of home basal dose. Low dose sliding scale. Hypoglycemia protocol

## 2022-08-19 NOTE — HPI
"  Leanna García is a 57 y.o. female with a history as  has a past medical history of A-fib, Anemia due to end stage renal disease (6/30/2022), Arthritis, Bronchitis, Cardiovascular event risk, ASCVD 10-year risk 6.7% (10/15/2016), Diabetes mellitus type II, Diabetic foot infection, Diabetic ulcer of left midfoot (05/05/2022), Disorder of kidney and ureter, Encounter for blood transfusion, ESRD (end stage renal disease) (05/18/2018), MANJINDER (generalized anxiety disorder) (10/25/2016), History of colon polyps (11/02/2016), Hyperlipidemia, Hypertension, and Stroke.       Patient is known to me from previous hospital admissions.  I have not seen patient in several weeks.  She failed to follow up outpatient in wound clinic once discharged from the LTAC.  She states that she had "a lot going on" with other medical issues she is dealing with and did not get around coming to a wound clinic appointment.  Patient is trying to get better and has been compliant with floating heels at home.  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  Patient's left great toe wound has worsened since I saw her last.    Patient presented to the hospital due to concerns of new right foot wounds.    "

## 2022-08-19 NOTE — PLAN OF CARE
"[facility], [facility]  Initial Discharge Assessment       Primary Care Provider: Stefano Lopez MD    Admission Diagnosis: Diabetic ulcer of right midfoot associated with type 2 diabetes mellitus, limited to breakdown of skin [E11.621, L97.411]    Admission Date: 8/18/2022    Discharge assessment completed with pt at bedside. Inforamtion verified as correct on facesheet. Pt denies HPOA- NOK pt's spouse Donovan [phone]. Pt is active with [facility]. She has a scheduled chair time MWF at [facility] in [place], [place]. DME at home is wound care supplies and a rolling walker. Discharge plan is home with spouse with resumption of home health. CM following for additional discharge needs.        Payor: [employer] MANAGED MEDICARE / Plan: [employer] MEDICARE PPO / Product Type: Medicare Advantage /     Extended Emergency Contact Information  Primary Emergency Contact: Raquel,Donovan  Address: [address] [place] [address] [place]  Mobile Phone: [phone]  Relation: Spouse  Preferred language: English   needed? No  Secondary Emergency Contact: Sonia Cota  Address: [address] [place] [address] [place]  Mobile Phone: [phone]  Relation: Mother  Preferred language: English   needed? No    Discharge Plan A: (P) Home with family  Discharge Plan B: (P) Home with family, Home Health        Initial Assessment (most recent)       Adult Discharge Assessment - 08/19/22 0849          Discharge Assessment    Assessment Type Discharge Planning Assessment     Confirmed/corrected address, phone number and insurance Yes     Confirmed Demographics Correct on Facesheet     Source of Information patient     Does patient/caregiver understand observation status Yes     Communicated ERI with patient/caregiver Yes     Reason For Admission "came to the ER so they can check my foot"     Lives With spouse     Facility " Arrived From: home     Do you expect to return to your current living situation? Yes     Do you have help at home or someone to help you manage your care at home? Yes     Who are your caregiver(s) and their phone number(s)? Mr. Man - spouse     Prior to hospitilization cognitive status: Alert/Oriented     Current cognitive status: Alert/Oriented     Walking or Climbing Stairs Difficulty ambulation difficulty, requires equipment     Dressing/Bathing Difficulty bathing difficulty, requires equipment     Home Accessibility wheelchair accessible     Equipment Currently Used at Home walker, rolling;wound care supplies (P)      Readmission within 30 days? No (P)      Patient currently being followed by outpatient case management? No (P)      Do you currently have service(s) that help you manage your care at home? Yes (P)      Name and Contact number of agency [facility] (P)      Is the pt/caregiver preference to resume services with current agency Yes (P)      Do you take prescription medications? Yes (P)      Do you have prescription coverage? Yes (P)      Coverage [employer] (P)      Do you have any problems affording any of your prescribed medications? No (P)      Is the patient taking medications as prescribed? yes (P)      Who is going to help you get home at discharge? spouse (P)      How do you get to doctors appointments? family or friend will provide (P)      Are you on dialysis? Yes (P)      Dialysis Name and Scheduled days [facility] in [place], [place] - chair days MWF (P)      Do you take coumadin? No (P)      Discharge Plan A Home with family (P)      Discharge Plan B Home with family;Home Health (P)      DME Needed Upon Discharge  other (see comments) (P)    TBD    Discharge Plan discussed with: Patient (P)

## 2022-08-19 NOTE — ASSESSMENT & PLAN NOTE
Admitted for rule out ACS.  Ischemic work-up (-) to date. EKG is non-ischemic. Initial troponin level negative. BNP pending. Plan for continuous cardiac monitoring. Continue to trend serial troponins q6 hours X 2. ASA/NTG 0.4 mg SL PRN CP.  Will defer additional testing to AM hospitalist      Echo 5/2022  Summary  · The left ventricle is normal in size with normal systolic function.  · The estimated ejection fraction is 65%.  · Normal left ventricular diastolic function.  · Normal right ventricular size with normal right ventricular systolic function.  · Mild mitral regurgitation.

## 2022-08-19 NOTE — H&P
"Formerly [facility] - Emergency Dept  Hospital Medicine  History & Physical    Patient Name: Leanna García  MRN: [id number]  Patient Class: OP- Observation  Admission Date: 8/18/2022  Attending Physician: Woody Acosta MD   Primary Care Provider: Stefano Lopez MD         Patient information was obtained from patient and ER records.     Subjective:     Principal Problem:Gastroparesis    Chief Complaint:   Chief Complaint   Patient presents with    Wound Infection     Diabetic pt with a right foot wound that looks to be stage II with mild redness. Pt is concerned and wanted to get it looked at "before it got too bad". Pt currently is being seen by woundcare for her other foot, presented with a wound vac.         HPI: Leanna García is a 57 y.o. female with a history as  has a past medical history of A-fib, Anemia due to end stage renal disease (6/30/2022), Arthritis, Bronchitis, Cardiovascular event risk, ASCVD 10-year risk 6.7% (10/15/2016), Diabetes mellitus type II, Diabetic foot infection, Diabetic ulcer of left midfoot (05/05/2022), Disorder of kidney and ureter, Encounter for blood transfusion, ESRD (end stage renal disease) (05/18/2018), MANJINDER (generalized anxiety disorder) (10/25/2016), History of colon polyps (11/02/2016), Hyperlipidemia, Hypertension, and Stroke. who presented to the ED with a Wound Infection (Diabetic pt with a right foot wound that looks to be stage II with mild redness. Pt is concerned and wanted to get it looked at "before it got too bad". Pt currently is being seen by woundcare for her other foot, presented with a wound vac. )      Patient presented to the emergency for evaluation of a right foot wound check. She reports reports while she was waiting she ate a salad. Further states once she got to the ED room, she was given IV ABX and was about to be discharged when she became nauseated and started vomiting. Per the ED, she was given IV zosyn with plans to discharge.  " "She was then given antiemetics for the NV, but was uncontrolled. Emesis looked like undigested food, so she was then treated with Reglan. ED provider also reports patient then complained of chest pain and abdominal pain.  Patient reports generalized CP 20/10 PRS as describes as "feel like i'm being beating up in chest." She further states the chest pain is going all over into my stomach. While at bedside, patient started to force herself to gagged and vomit. Will admit for symptom management with antiemetics. Consult Nephrology for ESRD. Consult Dr. Bruno for diabetic foot wound.       Lab and imaging obtained and reviewed.   CBC shows hemoglobin 11.5 MCH 26.5 MCHC 30.6 RDW 15.4 lymph% 17.7.  CMP shows sodium 135 BUN 54 creatinine 6.2 GFR 7.4 glucose 233 total protein 8.6.  Chest x-ray without acute cardiopulmonary findings. XR right foot shows Degenerative changes of the foot with no acute osseous abnormality observed.    CT abdomen pelvis  IMPRESSION:   1. Incidental parapelvic gallstones.  2. Stable perinephric stranding.  3. Questionable destructive changes of the L5-S1 disc space. If an occult infection is soft tissue consider MR imaging pre and postcontrast to assess this further. This is not changed however significantly since 5/4/2022            No new subjective & objective note has been filed under this hospital service since the last note was generated.    Assessment/Plan:     Active Hospital Problems    Diagnosis  POA    *Gastroparesis [K31.84]  Yes     Priority: 1 - High    Chest pain [R07.9]  Yes     Priority: 2     Slow transit constipation [K59.01]  Yes    Ulcer of left foot with necrosis of muscle [L97.523]  Yes    PAD (peripheral artery disease) [I73.9]  Yes    Type 2 diabetes mellitus with kidney complication, with long-term current use of insulin [E11.29, Z79.4]  Not Applicable    ESRD (end stage renal disease) [N18.6]  Yes    Hyperlipemia [E78.5]  Yes    Hypertension associated with " diabetes [E11.59, I15.2]  Yes     Chronic      Resolved Hospital Problems   No resolved problems to display.       Gastroparesis  Patient with nausea and vomiting (undigested food). Given Zofran without resolution. Stared on reglan as ordered.       Slow transit constipation  Bowel regimen      Ulcer of left foot with necrosis of muscle  Right foot ulcer    Wound vac in place. Followed by Dr. Bruno. Will consult while in patient. Wound care consulted      PAD (peripheral artery disease)  Continue GDMT      Type 2 diabetes mellitus with kidney complication, with long-term current use of insulin  Patient's FSGs are uncontrolled due to hyperglycemia on current medication regimen.  Last A1c reviewed-   Lab Results   Component Value Date    LABA1C 10.3 (H) 09/11/2015    HGBA1C 7.5 (H) 06/22/2022     Most recent fingerstick glucose reviewed- No results for input(s): POCTGLUCOSE in the last 24 hours.  Current correctional scale  Low  Maintain anti-hyperglycemic dose as follows-   Antihyperglycemics (From admission, onward)            Start     Stop Route Frequency Ordered    08/19/22 2100  insulin detemir U-100 pen 8 Units         -- SubQ Nightly 08/18/22 2319        Will provide of home basal dose. Low dose sliding scale. Hypoglycemia protocol    ESRD (end stage renal disease)  Nephrology consulted for inpatient HD.       Hyperlipemia  Continue ASA/Statin      Chest pain  Admitted for rule out ACS.  Ischemic work-up (-) to date. EKG is non-ischemic. Initial troponin level negative. BNP pending. Plan for continuous cardiac monitoring. Continue to trend serial troponins q6 hours X 2. ASA/NTG 0.4 mg SL PRN CP.  Will defer additional testing to AM hospitalist      Echo 5/2022  Summary  · The left ventricle is normal in size with normal systolic function.  · The estimated ejection fraction is 65%.  · Normal left ventricular diastolic function.  · Normal right ventricular size with normal right ventricular systolic  function.  · Mild mitral regurgitation.                Hypertension associated with diabetes  Continue home antihypertensives.       VTE Risk Mitigation (From admission, onward)         Ordered     heparin (porcine) injection 5,000 Units  Every 8 hours         08/18/22 2311     IP VTE HIGH RISK PATIENT  Once         08/18/22 2311     Place sequential compression device  Until discontinued         08/18/22 2311                   CARMELA Humphreys  Department of Hospital Medicine   Formerly [facility] - Emergency Dept

## 2022-08-19 NOTE — HPI
"Leanna García is a 57 y.o. female with a history as  has a past medical history of A-fib, Anemia due to end stage renal disease (6/30/2022), Arthritis, Bronchitis, Cardiovascular event risk, ASCVD 10-year risk 6.7% (10/15/2016), Diabetes mellitus type II, Diabetic foot infection, Diabetic ulcer of left midfoot (05/05/2022), Disorder of kidney and ureter, Encounter for blood transfusion, ESRD (end stage renal disease) (05/18/2018), MANJINDER (generalized anxiety disorder) (10/25/2016), History of colon polyps (11/02/2016), Hyperlipidemia, Hypertension, and Stroke. who presented to the ED with a Wound Infection (Diabetic pt with a right foot wound that looks to be stage II with mild redness. Pt is concerned and wanted to get it looked at "before it got too bad". Pt currently is being seen by woundcare for her other foot, presented with a wound vac. )      Patient presented to the emergency for evaluation of a right foot wound check. She reports reports while she was waiting she ate a salad. Further states once she got to the ED room, she was given IV ABX and was about to be discharged when she became nauseated and started vomiting. Per the ED, she was given IV zosyn with plans to discharge.  She was then given antiemetics for the NV, but was uncontrolled. Emesis looked like undigested food, so she was then treated with Reglan. ED provider also reports patient then complained of chest pain and abdominal pain.  Patient reports generalized CP 20/10 PRS as describes as "feel like i'm being beating up in chest." She further states the chest pain is going all over into my stomach. While at bedside, patient started to force herself to gagged and vomit. Will admit for symptom management with antiemetics. Consult Nephrology for ESRD. Consult Dr. Bruno for diabetic foot wound.       Lab and imaging obtained and reviewed.   CBC shows hemoglobin 11.5 MCH 26.5 MCHC 30.6 RDW 15.4 lymph% 17.7.  CMP shows sodium 135 BUN 54 creatinine " 6.2 GFR 7.4 glucose 233 total protein 8.6.  Chest x-ray without acute cardiopulmonary findings. XR right foot shows Degenerative changes of the foot with no acute osseous abnormality observed.    CT abdomen pelvis  IMPRESSION:   1. Incidental parapelvic gallstones.  2. Stable perinephric stranding.  3. Questionable destructive changes of the L5-S1 disc space. If an occult infection is soft tissue consider MR imaging pre and postcontrast to assess this further. This is not changed however significantly since 5/4/2022

## 2022-08-19 NOTE — SUBJECTIVE & OBJECTIVE
Scheduled Meds:   amLODIPine  10 mg Oral Daily    aspirin  81 mg Oral Daily    atorvastatin  80 mg Oral Daily    calcium acetate(phosphat bind)  667 mg Oral TID WM    clopidogreL  75 mg Oral Daily    epoetin chris-epbx  4,400 Units Subcutaneous Every Mon, Wed, Fri    heparin (porcine)  5,000 Units Subcutaneous Q8H    hydrALAZINE  50 mg Oral Q8H    insulin detemir U-100  8 Units Subcutaneous QHS    losartan  100 mg Oral Daily    metoclopramide HCl  5 mg Intravenous Q6H     Continuous Infusions:  PRN Meds:dextrose 10%, dextrose 10%, glucagon (human recombinant), glucose, glucose, hydrALAZINE, insulin aspart U-100, naloxone, ondansetron, senna-docusate 8.6-50 mg, sodium chloride 0.9%    Review of patient's allergies indicates:   Allergen Reactions    Dilaudid [hydromorphone] Nausea And Vomiting    Vancomycin Itching    Chlorhexidine Itching    Lortab [hydrocodone-acetaminophen] Itching        Past Medical History:   Diagnosis Date    A-fib     resolved per patient    Anemia due to end stage renal disease 6/30/2022    Arthritis     Bronchitis     Cardiovascular event risk, ASCVD 10-year risk 6.7% 10/15/2016    Diabetes mellitus type II     Diabetic foot infection     Diabetic ulcer of left midfoot 05/05/2022    Disorder of kidney and ureter     Encounter for blood transfusion     ESRD (end stage renal disease) 05/18/2018    MANJINDER (generalized anxiety disorder) 10/25/2016    History of colon polyps 11/02/2016    Hyperlipidemia     Hypertension     Stroke      Past Surgical History:   Procedure Laterality Date    COLONOSCOPY N/A 10/5/2016    Procedure: COLONOSCOPY;  Surgeon: Pillo Chanel MD;  Location: [facility] ENDO;  Service: Endoscopy;  Laterality: N/A;    COLONOSCOPY N/A 4/26/2022    Procedure: COLONOSCOPY;  Surgeon: Saravanan Rachel MD;  Location: [facility] ENDO;  Service: Endoscopy;  Laterality: N/A;    HERNIA REPAIR Bilateral 11/22/2016    inguinal    HYSTERECTOMY      INCISION AND DRAINAGE FOOT Left 5/13/2022     Procedure: INCISION AND DRAINAGE, FOOT;  Surgeon: Ricardo Bruno DPM;  Location: [facility];  Service: Podiatry;  Laterality: Left;    OOPHORECTOMY         Family History       Problem Relation (Age of Onset)    Cancer Paternal Grandmother    Diabetes Father, Sister, Sister, Maternal Aunt, Maternal Grandmother    Heart disease Maternal Grandmother    Hyperlipidemia Mother    Hypertension Mother, Father          Tobacco Use    Smoking status: Never Smoker    Smokeless tobacco: Never Used   Substance and Sexual Activity    Alcohol use: No    Drug use: No    Sexual activity: Yes     Partners: Male     Review of Systems  Objective:     Vital Signs (Most Recent):  Temp: 98.7 °F (37.1 °C) (08/19/22 0719)  Pulse: 89 (08/19/22 0719)  Resp: 18 (08/19/22 0719)  BP: (!) 185/118 (08/19/22 0719)  SpO2: (!) 94 % (08/19/22 0719)   Vital Signs (24h Range):  Temp:  [98.1 °F (36.7 °C)-98.8 °F (37.1 °C)] 98.7 °F (37.1 °C)  Pulse:  [79-94] 89  Resp:  [17-39] 18  SpO2:  [94 %-99 %] 94 %  BP: (157-212)/() 185/118     Weight: 83.9 kg (184 lb 15.5 oz)  Body mass index is 27.31 kg/m².    Foot Exam    Right Foot/Ankle     Neurovascular  Dorsalis pedis: doppler  Posterior tibial: doppler  Saphenous nerve sensation: absent  Tibial nerve sensation: absent  Superficial peroneal nerve sensation: absent  Deep peroneal nerve sensation: absent  Sural nerve sensation: absent      Left Foot/Ankle      Neurovascular  Dorsalis pedis: doppler  Posterior tibial: doppler  Saphenous nerve sensation: absent  Tibial nerve sensation: absent  Superficial peroneal nerve sensation: absent  Deep peroneal nerve sensation: absent  Sural nerve sensation: absent                Post debridement of left great toe ulcer picture below    Post debridement of left plantar foot ulcer picture below          Laboratory:  All pertinent labs reviewed within the last 24 hours.    Diagnostic Results:  I have reviewed all pertinent imaging results/findings within the past 24  hours.

## 2022-08-19 NOTE — PLAN OF CARE
ERICKSON explained to patient- pt v/u and signed form. ERICKSON uploaded into media manager.        08/19/22 0871   ERICKSON Message   Medicare Outpatient and Observation Notification regarding financial responsibility Given to patient/caregiver;Explained to patient/caregiver;Signed/date by patient/caregiver   Date ERICKSON was signed 08/19/22   Time ERICKSON was signed 0890

## 2022-08-19 NOTE — PROCEDURES
"Leanna García is a 57 y.o. female patient.    Temp: 98.7 °F (37.1 °C) (08/19/22 0719)  Pulse: 89 (08/19/22 0719)  Resp: 18 (08/19/22 0719)  BP: (!) 185/118 (08/19/22 0719)  SpO2: (!) 94 % (08/19/22 0719)  Weight: 83.9 kg (184 lb 15.5 oz) (08/19/22 0053)  Height: 5' 9" (175.3 cm) (08/19/22 0053)       Debridement    Date/Time: 8/19/2022 10:18 AM  Performed by: Alivia Bruno DPM  Authorized by: Alivia Bruno DPM     Time out: Immediately prior to procedure a "time out" was called to verify the correct patient, procedure, equipment, support staff and site/side marked as required.    Consent Done?:  Yes (Verbal)    Preparation: Patient was prepped and draped in usual sterile fashion    Local anesthesia used?: No      Wound Details:    Location:  Left foot    Location:  Left Midfoot    Type of Debridement:  Excisional       Length (cm):  2.2       Area (sq cm):  5.72       Width (cm):  2.6       Percent Debrided (%):  70       Depth (cm):  0.8       Total Area Debrided (sq cm):  4    Depth of debridement:  Subcutaneous tissue    Tissue debrided:  Subcutaneous    Devitalized tissue debrided:  Biofilm, Slough, Other and Necrotic/Eschar    Instruments:  Forceps, Blade, Curette and Nippers    Additional wounds:  1    2nd Wound Details:     Location:  Left foot    Location:  Left 1st Toe    Location:  Left 1st Toe    Type of Debridement:  Excisional       Length (cm):  5       Area (sq cm):  21       Width (cm):  4.2       Percent Debrided (%):  100       Depth (cm):  0.5       Total Area Debrided (sq cm):  21    Depth of debridement:  Muscle/fascia/tendon    Tissue debrided:  Fascia and Subcutaneous    Devitalized tissue debrided:  Necrotic/Eschar, Slough, Other, Biofilm and Fibrin    Instruments:  Blade, Curette, Forceps and Nippers    Bleeding:  Moderate  Hemostasis Achieved: Yes    Method Used:  Pressure  Patient tolerance:  Patient tolerated the procedure well with no immediate complications        8/19/2022  "

## 2022-08-19 NOTE — CONSULTS
"INPATIENT NEPHROLOGY CONSULT   [facility]    Patient Name: Leanna García  Date: 08/19/2022    Reason for consultation: ESRD    Chief Complaint:   Chief Complaint   Patient presents with    Wound Infection     Diabetic pt with a right foot wound that looks to be stage II with mild redness. Pt is concerned and wanted to get it looked at "before it got too bad". Pt currently is being seen by woundcare for her other foot, presented with a wound vac.        History of Present Illness:  Leanna García is a 57 y.o. female with a history as  has a past medical history of A-fib, Anemia due to end stage renal disease (6/30/2022), Arthritis, Bronchitis, Cardiovascular event risk, ASCVD 10-year risk 6.7% (10/15/2016), Diabetes mellitus type II, Diabetic foot infection, Diabetic ulcer of left midfoot (05/05/2022), Disorder of kidney and ureter, Encounter for blood transfusion, ESRD (end stage renal disease) (05/18/2018), MANJINDER (generalized anxiety disorder) (10/25/2016), History of colon polyps (11/02/2016), Hyperlipidemia, Hypertension, and Stroke. who presented to the ED with a Wound Infection (Diabetic pt with a right foot wound that looks to be stage II with mild redness. Pt is concerned and wanted to get it looked at "before it got too bad". Pt currently is being seen by woundcare for her other foot, presented with a wound vac. )        Patient presented to the emergency for evaluation of a right foot wound check. She reports reports while she was waiting she ate a salad. Further states once she got to the ED room, she was given IV ABX and was about to be discharged when she became nauseated and started vomiting. Per the ED, she was given IV zosyn with plans to discharge.  She was then given antiemetics for the NV, but was uncontrolled. Emesis looked like undigested food, so she was then treated with Reglan. ED provider also reports patient then complained of chest pain and abdominal pain.  Patient " "reports generalized CP 20/10 PRS as describes as "feel like i'm being beating up in chest." She further states the chest pain is going all over into my stomach. While at bedside, patient started to force herself to gagged and vomit. Will admit for symptom management with antiemetics. Consult Nephrology for ESRD. Consult Dr. Bruno for diabetic foot wound.        Lab and imaging obtained and reviewed.   CBC shows hemoglobin 11.5 MCH 26.5 MCHC 30.6 RDW 15.4 lymph% 17.7.  CMP shows sodium 135 BUN 54 creatinine 6.2 GFR 7.4 glucose 233 total protein 8.6.  Chest x-ray without acute cardiopulmonary findings. XR right foot shows Degenerative changes of the foot with no acute osseous abnormality observed.     CT abdomen pelvis  IMPRESSION:   1. Incidental parapelvic gallstones.  2. Stable perinephric stranding.  3. Questionable destructive changes of the L5-S1 disc space. If an occult infection is soft tissue consider MR imaging pre and postcontrast to assess this further. This is not changed however significantly since 5/4/2022     Interval History:  8/19- thinks she has food poisoning, Dr. Bruno looking at foot wounds    Plan of Care:    Assessment:  ESRD on HD MWF  HTN  Hyponatremia  Acidosis  SHPT  Anemia of CKD    Plan:    - ordered HD MWF  - resume home BP meds but UF even given GI distress  - renal diet, 1.5L fluid restriction  - adjust dialysate  - resume binder with meals  - ordered FERMÍN with HD    Thank you for allowing us to participate in this patient's care. We will continue to follow.    Vital Signs:  Temp Readings from Last 3 Encounters:   08/19/22 98.7 °F (37.1 °C) (Oral)   08/12/22 98 °F (36.7 °C)   07/29/22 96.7 °F (35.9 °C) (Axillary)       Pulse Readings from Last 3 Encounters:   08/19/22 89   08/12/22 66   07/29/22 75       BP Readings from Last 3 Encounters:   08/19/22 (!) 185/118   08/12/22 (!) 182/79   07/29/22 132/89       Weight:  Wt Readings from Last 3 Encounters:   08/19/22 83.9 kg (184 lb 15.5 " oz)   08/07/22 87.3 kg (192 lb 7.4 oz)   07/24/22 78.9 kg (173 lb 14.4 oz)       Past Medical & Surgical History:  Past Medical History:   Diagnosis Date    A-fib     resolved per patient    Anemia due to end stage renal disease 6/30/2022    Arthritis     Bronchitis     Cardiovascular event risk, ASCVD 10-year risk 6.7% 10/15/2016    Diabetes mellitus type II     Diabetic foot infection     Diabetic ulcer of left midfoot 05/05/2022    Disorder of kidney and ureter     Encounter for blood transfusion     ESRD (end stage renal disease) 05/18/2018    MANJINDER (generalized anxiety disorder) 10/25/2016    History of colon polyps 11/02/2016    Hyperlipidemia     Hypertension     Stroke        Past Surgical History:   Procedure Laterality Date    COLONOSCOPY N/A 10/5/2016    Procedure: COLONOSCOPY;  Surgeon: Pillo Chanel MD;  Location: [facility] ENDO;  Service: Endoscopy;  Laterality: N/A;    COLONOSCOPY N/A 4/26/2022    Procedure: COLONOSCOPY;  Surgeon: Saravanan Rachel MD;  Location: [facility] ENDO;  Service: Endoscopy;  Laterality: N/A;    HERNIA REPAIR Bilateral 11/22/2016    inguinal    HYSTERECTOMY      INCISION AND DRAINAGE FOOT Left 5/13/2022    Procedure: INCISION AND DRAINAGE, FOOT;  Surgeon: Ricardo Bruno DPM;  Location: [facility] OR;  Service: Podiatry;  Laterality: Left;    OOPHORECTOMY         Past Social History:  Social History     Socioeconomic History    Marital status:    Tobacco Use    Smoking status: Never Smoker    Smokeless tobacco: Never Used   Substance and Sexual Activity    Alcohol use: No    Drug use: No    Sexual activity: Yes     Partners: Male   Social History Narrative    Caregiver        Medications:  Scheduled Meds:   amLODIPine  10 mg Oral Daily    aspirin  81 mg Oral Daily    atorvastatin  80 mg Oral Daily    calcium acetate(phosphat bind)  667 mg Oral TID WM    clopidogreL  75 mg Oral Daily    epoetin chris-epbx  4,400 Units Subcutaneous Every Mon, Wed,  Fri    heparin (porcine)  5,000 Units Subcutaneous Q8H    hydrALAZINE  50 mg Oral Q8H    insulin detemir U-100  8 Units Subcutaneous QHS    losartan  100 mg Oral Daily    metoclopramide HCl  5 mg Intravenous Q6H     Continuous Infusions:  PRN Meds:.dextrose 10%, dextrose 10%, glucagon (human recombinant), glucose, glucose, hydrALAZINE, insulin aspart U-100, naloxone, ondansetron, senna-docusate 8.6-50 mg, sodium chloride 0.9%  No current facility-administered medications on file prior to encounter.     Current Outpatient Medications on File Prior to Encounter   Medication Sig Dispense Refill    amLODIPine (NORVASC) 10 MG tablet Take 1 tablet (10 mg total) by mouth once daily.      aspirin (ECOTRIN) 81 MG EC tablet Take 81 mg by mouth once daily.      atorvastatin (LIPITOR) 80 MG tablet Take 1 tablet (80 mg total) by mouth once daily. 90 tablet 3    azelastine (ASTELIN) 137 mcg (0.1 %) nasal spray 1 spray (137 mcg total) by Nasal route 2 (two) times a day. 30 mL 0    calcium acetate,phosphat bind, (PHOSLO) 667 mg capsule Take 1 capsule (667 mg total) by mouth 3 (three) times daily with meals.      cetirizine (ZYRTEC) 10 MG tablet Take 1 tablet (10 mg total) by mouth every other day.      clopidogreL (PLAVIX) 75 mg tablet Take 1 tablet (75 mg total) by mouth once daily. 30 tablet 0    epoetin chris-epbx (RETACRIT) 4,000 unit/mL injection Inject 1.11 mLs (4,440 Units total) into the skin every Mon, Wed, Fri.      fluticasone propionate (FLONASE) 50 mcg/actuation nasal spray 2 sprays (100 mcg total) by Each Nostril route once daily.      gabapentin (NEURONTIN) 100 MG capsule Take 1 capsule (100 mg total) by mouth 2 (two) times daily.      hydrALAZINE (APRESOLINE) 50 MG tablet Take 1 tablet (50 mg total) by mouth every 8 (eight) hours. 90 tablet 0    insulin aspart U-100 (NOVOLOG FLEXPEN U-100 INSULIN) 100 unit/mL (3 mL) InPn pen Inject 5-8 units 3 times per day with food. (Patient taking differently:  "Inject 5-8 Units into the skin 3 (three) times daily with meals. Inject 5-8 units 3 times per day with food.) 1 Box 6    insulin detemir U-100 (LEVEMIR FLEXTOUCH U-100 INSULN) 100 unit/mL (3 mL) InPn pen Inject 15 Units into the skin every evening.      lactulose (CHRONULAC) 10 gram/15 mL solution Take 20 g by mouth 2 (two) times a day.      losartan (COZAAR) 100 MG tablet Take 1 tablet (100 mg total) by mouth once daily.      ondansetron (ZOFRAN-ODT) 8 MG TbDL Take 1 tablet (8 mg total) by mouth every 8 (eight) hours as needed (nausea).      oxyCODONE (ROXICODONE) 5 MG immediate release tablet Take 1 tablet (5 mg total) by mouth every 6 (six) hours as needed for Pain.      polyethylene glycol (GLYCOLAX) 17 gram PwPk Take 17 g by mouth 2 (two) times daily as needed.      senna-docusate 8.6-50 mg (PERICOLACE) 8.6-50 mg per tablet Take 1 tablet by mouth 2 (two) times daily.      blood sugar diagnostic Strp To check BG 4 times daily, to use with insurance preferred meter (Patient taking differently: 1 strip by Misc.(Non-Drug; Combo Route) route 4 (four) times daily. To check BG 4 times daily, to use with insurance preferred meter) 450 strip 3    pen needle, diabetic (BD ULTRA-FINE ROBERT PEN NEEDLE) 32 gauge x 5/32" Ndle To use 4 times per day with insulin injections. (Patient taking differently: 1 pen by Misc.(Non-Drug; Combo Route) route 4 (four) times daily. To use 4 times per day with insulin injections.) 450 each 2    [DISCONTINUED] blood-glucose meter (ACCU-CHEK KAYLIE PLUS METER) Misc To use to check blood sugars 4 times a day 1 each 0    [DISCONTINUED] clorazepate (TRANXENE) 3.75 MG Tab Take 7.5 mg by mouth nightly as needed.       [DISCONTINUED] dextrose (GLUCOSE GEL) 40 % gel Take 37.5 mLs (15,000 mg total) by mouth once as needed (hypoglycemia). 37.5 g 4    [DISCONTINUED] ezetimibe (ZETIA) 10 mg tablet Take 1 tablet (10 mg total) by mouth once daily.      [DISCONTINUED] fenofibrate 160 MG Tab Take " 1 tablet (160 mg total) by mouth once daily. 90 tablet 3    [DISCONTINUED] lancing device with lancets (ACCU-CHEK SOFT DEV LANCETS) Kit To check blood sugars 4 times per day 400 each 3    [DISCONTINUED] pantoprazole (PROTONIX) 40 MG tablet Take 1 tablet (40 mg total) by mouth once daily.         Allergies:  Dilaudid [hydromorphone], Vancomycin, Chlorhexidine, and Lortab [hydrocodone-acetaminophen]    Past Family History:  Reviewed; refer to Hospitalist Admission Note    Review of Systems:  Review of Systems - All 14 systems reviewed and negative, except as noted in HPI    Physical Exam:  General Appearance:    NAD, AAO x 3, cooperative, appears stated age   Head:    Normocephalic, atraumatic   Eyes:    PER, EOMI, and conjunctiva/sclera clear bilaterally       Mouth:   Dry MM   Back:     No CVA tenderness   Lungs:     Clear to auscultation bilaterally, no wheezes, crackles,             rales or rhonchi, symmetric air movement, respirations   unlabored   Chest wall:    No tenderness or deformity   Heart:    Regular rate and rhythm, S1 and S2 normal, no murmur, rub   or gallop   Abdomen:     Soft, non-tender, non-distended, bowel sounds active all four   quadrants, no RT or guarding, no masses, no organomegaly   Extremities:   Warm, no edema   MSK:   Atrophy   Skin:   Foot wrapped   Neurologic/Psychiatric:   CNII-XII intact, normal strength and sensation       throughout, no asterixis; normal affect, memory, judgement     and insight      Results:  Lab Results   Component Value Date     (L) 08/19/2022    K 4.9 08/19/2022    CL 97 08/19/2022    CO2 19 (L) 08/19/2022    BUN 64 (H) 08/19/2022    CREATININE 7.1 (H) 08/19/2022    CALCIUM 9.3 08/19/2022    ANIONGAP 18 (H) 08/19/2022    ESTGFRAFRICA 5 (A) 07/29/2022    EGFRNONAA 5 (A) 07/29/2022       Lab Results   Component Value Date    CALCIUM 9.3 08/19/2022    PHOS 5.6 (H) 08/19/2022       Recent Labs   Lab 08/19/22  0649   WBC 11.22   RBC 3.64*   HGB 9.8*   HCT  31.5*      MCV 87   MCH 26.9*   MCHC 31.1*       I have personally reviewed pertinent radiological imaging and reports.    I have spent > 70 minutes providing care for this patient for the above diagnoses. These services have included chart/data/imaging review, evaluation, exam, formulation of plan, , note preparation, and discussions with staff involved in this patient's care.    Ashleigh Soria MD MPH  [facility] [address] [address]  [phone] (p)  [phone] (f)

## 2022-08-19 NOTE — PROGRESS NOTES
Pharmacist Renal Dose Adjustment Note    Leanna García is a 57 y.o. female being treated with the medication metoclopramide    Patient Data:    Vital Signs (Most Recent):  Temp: 98.7 °F (37.1 °C) (08/19/22 0719)  Pulse: 89 (08/19/22 0719)  Resp: 18 (08/19/22 0719)  BP: (!) 185/118 (08/19/22 0719)  SpO2: (!) 94 % (08/19/22 0719)   Vital Signs (72h Range):  Temp:  [98.1 °F (36.7 °C)-98.8 °F (37.1 °C)]   Pulse:  [79-94]   Resp:  [17-39]   BP: (157-212)/()   SpO2:  [94 %-99 %]      Recent Labs   Lab 08/18/22  1713   CREATININE 6.2*     Serum creatinine: 6.2 mg/dL (H) 08/18/22 1713  Estimated creatinine clearance: 11.6 mL/min (A)    Metoclopramide 10mg every 8 hours will be changed to metoclopramide 5mg every 6 hours.     Pharmacist's Name: Soledad Zuniga  Pharmacist's Extension: [phone]

## 2022-08-19 NOTE — ASSESSMENT & PLAN NOTE
Right foot ulcer    Wound vac in place. Followed by Dr. Bruno. Will consult while in patient. Wound care consulted

## 2022-08-20 LAB
ALBUMIN SERPL BCP-MCNC: 3.1 G/DL (ref 3.5–5.2)
ALP SERPL-CCNC: 64 U/L (ref 55–135)
ALT SERPL W/O P-5'-P-CCNC: 20 U/L (ref 10–44)
ANION GAP SERPL CALC-SCNC: 8 MMOL/L (ref 8–16)
AST SERPL-CCNC: 16 U/L (ref 10–40)
BASOPHILS # BLD AUTO: 0.05 K/UL (ref 0–0.2)
BASOPHILS NFR BLD: 0.5 % (ref 0–1.9)
BILIRUB SERPL-MCNC: 0.5 MG/DL (ref 0.1–1)
BUN SERPL-MCNC: 32 MG/DL (ref 6–20)
CALCIUM SERPL-MCNC: 9.3 MG/DL (ref 8.7–10.5)
CHLORIDE SERPL-SCNC: 99 MMOL/L (ref 95–110)
CO2 SERPL-SCNC: 31 MMOL/L (ref 23–29)
CREAT SERPL-MCNC: 4.9 MG/DL (ref 0.5–1.4)
DIFFERENTIAL METHOD: ABNORMAL
EOSINOPHIL # BLD AUTO: 0.2 K/UL (ref 0–0.5)
EOSINOPHIL NFR BLD: 2.2 % (ref 0–8)
ERYTHROCYTE [DISTWIDTH] IN BLOOD BY AUTOMATED COUNT: 15.3 % (ref 11.5–14.5)
EST. GFR  (NO RACE VARIABLE): 9.8 ML/MIN/1.73 M^2
GLUCOSE SERPL-MCNC: 184 MG/DL (ref 70–110)
GLUCOSE SERPL-MCNC: 200 MG/DL (ref 70–110)
GLUCOSE SERPL-MCNC: 224 MG/DL (ref 70–110)
GLUCOSE SERPL-MCNC: 226 MG/DL (ref 70–110)
GLUCOSE SERPL-MCNC: 245 MG/DL (ref 70–110)
HCT VFR BLD AUTO: 31.9 % (ref 37–48.5)
HGB BLD-MCNC: 9.8 G/DL (ref 12–16)
IMM GRANULOCYTES # BLD AUTO: 0.06 K/UL (ref 0–0.04)
IMM GRANULOCYTES NFR BLD AUTO: 0.6 % (ref 0–0.5)
LYMPHOCYTES # BLD AUTO: 2.3 K/UL (ref 1–4.8)
LYMPHOCYTES NFR BLD: 25.1 % (ref 18–48)
MAGNESIUM SERPL-MCNC: 2 MG/DL (ref 1.6–2.6)
MCH RBC QN AUTO: 26.7 PG (ref 27–31)
MCHC RBC AUTO-ENTMCNC: 30.7 G/DL (ref 32–36)
MCV RBC AUTO: 87 FL (ref 82–98)
MONOCYTES # BLD AUTO: 0.9 K/UL (ref 0.3–1)
MONOCYTES NFR BLD: 9.6 % (ref 4–15)
NEUTROPHILS # BLD AUTO: 5.8 K/UL (ref 1.8–7.7)
NEUTROPHILS NFR BLD: 62 % (ref 38–73)
NRBC BLD-RTO: 0 /100 WBC
PHOSPHATE SERPL-MCNC: 3.6 MG/DL (ref 2.7–4.5)
PLATELET # BLD AUTO: 305 K/UL (ref 150–450)
PMV BLD AUTO: 9.8 FL (ref 9.2–12.9)
POTASSIUM SERPL-SCNC: 3.7 MMOL/L (ref 3.5–5.1)
PROT SERPL-MCNC: 6.9 G/DL (ref 6–8.4)
RBC # BLD AUTO: 3.67 M/UL (ref 4–5.4)
SODIUM SERPL-SCNC: 138 MMOL/L (ref 136–145)
WBC # BLD AUTO: 9.3 K/UL (ref 3.9–12.7)

## 2022-08-20 PROCEDURE — 25000003 PHARM REV CODE 250: Performed by: NURSE PRACTITIONER

## 2022-08-20 PROCEDURE — 63600175 PHARM REV CODE 636 W HCPCS: Performed by: NURSE PRACTITIONER

## 2022-08-20 PROCEDURE — 83735 ASSAY OF MAGNESIUM: CPT | Performed by: NURSE PRACTITIONER

## 2022-08-20 PROCEDURE — 80053 COMPREHEN METABOLIC PANEL: CPT | Performed by: NURSE PRACTITIONER

## 2022-08-20 PROCEDURE — 25000003 PHARM REV CODE 250: Performed by: INTERNAL MEDICINE

## 2022-08-20 PROCEDURE — 85025 COMPLETE CBC W/AUTO DIFF WBC: CPT | Performed by: NURSE PRACTITIONER

## 2022-08-20 PROCEDURE — 63600175 PHARM REV CODE 636 W HCPCS: Performed by: INTERNAL MEDICINE

## 2022-08-20 PROCEDURE — 96372 THER/PROPH/DIAG INJ SC/IM: CPT | Performed by: NURSE PRACTITIONER

## 2022-08-20 PROCEDURE — 84100 ASSAY OF PHOSPHORUS: CPT | Performed by: NURSE PRACTITIONER

## 2022-08-20 PROCEDURE — 12000002 HC ACUTE/MED SURGE SEMI-PRIVATE ROOM

## 2022-08-20 PROCEDURE — 36415 COLL VENOUS BLD VENIPUNCTURE: CPT | Performed by: NURSE PRACTITIONER

## 2022-08-20 PROCEDURE — 96376 TX/PRO/DX INJ SAME DRUG ADON: CPT

## 2022-08-20 RX ORDER — OXYCODONE HYDROCHLORIDE 5 MG/1
5 TABLET ORAL EVERY 8 HOURS PRN
Status: DISCONTINUED | OUTPATIENT
Start: 2022-08-20 | End: 2022-08-30 | Stop reason: HOSPADM

## 2022-08-20 RX ORDER — TALC
6 POWDER (GRAM) TOPICAL NIGHTLY PRN
Status: DISCONTINUED | OUTPATIENT
Start: 2022-08-20 | End: 2022-08-30 | Stop reason: HOSPADM

## 2022-08-20 RX ADMIN — HEPARIN SODIUM 5000 UNITS: 5000 INJECTION INTRAVENOUS; SUBCUTANEOUS at 01:08

## 2022-08-20 RX ADMIN — METOCLOPRAMIDE 5 MG: 5 INJECTION, SOLUTION INTRAMUSCULAR; INTRAVENOUS at 06:08

## 2022-08-20 RX ADMIN — HYDRALAZINE HYDROCHLORIDE 50 MG: 25 TABLET ORAL at 06:08

## 2022-08-20 RX ADMIN — LOSARTAN POTASSIUM 100 MG: 50 TABLET, FILM COATED ORAL at 08:08

## 2022-08-20 RX ADMIN — HYDRALAZINE HYDROCHLORIDE 50 MG: 25 TABLET ORAL at 01:08

## 2022-08-20 RX ADMIN — AMLODIPINE BESYLATE 10 MG: 5 TABLET ORAL at 08:08

## 2022-08-20 RX ADMIN — ASPIRIN 81 MG: 81 TABLET, COATED ORAL at 08:08

## 2022-08-20 RX ADMIN — HEPARIN SODIUM 5000 UNITS: 5000 INJECTION INTRAVENOUS; SUBCUTANEOUS at 10:08

## 2022-08-20 RX ADMIN — METOCLOPRAMIDE 5 MG: 5 INJECTION, SOLUTION INTRAMUSCULAR; INTRAVENOUS at 05:08

## 2022-08-20 RX ADMIN — METOCLOPRAMIDE 5 MG: 5 INJECTION, SOLUTION INTRAMUSCULAR; INTRAVENOUS at 11:08

## 2022-08-20 RX ADMIN — CALCIUM ACETATE 667 MG: 667 CAPSULE ORAL at 05:08

## 2022-08-20 RX ADMIN — CALCIUM ACETATE 667 MG: 667 CAPSULE ORAL at 11:08

## 2022-08-20 RX ADMIN — CLOPIDOGREL BISULFATE 75 MG: 75 TABLET, FILM COATED ORAL at 08:08

## 2022-08-20 RX ADMIN — METOCLOPRAMIDE 5 MG: 5 INJECTION, SOLUTION INTRAMUSCULAR; INTRAVENOUS at 12:08

## 2022-08-20 RX ADMIN — CALCIUM ACETATE 667 MG: 667 CAPSULE ORAL at 08:08

## 2022-08-20 RX ADMIN — ATORVASTATIN CALCIUM 80 MG: 40 TABLET, FILM COATED ORAL at 08:08

## 2022-08-20 RX ADMIN — OXYCODONE HYDROCHLORIDE 5 MG: 5 TABLET ORAL at 09:08

## 2022-08-20 RX ADMIN — VANCOMYCIN HYDROCHLORIDE 1500 MG: 1.5 INJECTION, POWDER, LYOPHILIZED, FOR SOLUTION INTRAVENOUS at 02:08

## 2022-08-20 RX ADMIN — HYDRALAZINE HYDROCHLORIDE 50 MG: 25 TABLET ORAL at 10:08

## 2022-08-20 RX ADMIN — INSULIN ASPART 1 UNITS: 100 INJECTION, SOLUTION INTRAVENOUS; SUBCUTANEOUS at 12:08

## 2022-08-20 RX ADMIN — HEPARIN SODIUM 5000 UNITS: 5000 INJECTION INTRAVENOUS; SUBCUTANEOUS at 06:08

## 2022-08-20 RX ADMIN — INSULIN ASPART 2 UNITS: 100 INJECTION, SOLUTION INTRAVENOUS; SUBCUTANEOUS at 11:08

## 2022-08-20 NOTE — PROGRESS NOTES
Pharmacokinetic Initial Assessment: IV Vancomycin    Assessment/Plan:    Initiate intravenous vancomycin with loading dose of 1500 mg once with subsequent doses when random concentrations are less than 20 mcg/mL  Desired empiric serum trough concentration is 10 to 20 mcg/mL  Draw vancomycin random level on 8/21 at 1400.  Pharmacy will continue to follow and monitor vancomycin.      Please contact pharmacy at extension [phone] with any questions regarding this assessment.     Thank you for the consult,   Ricardo Feldman       Patient brief summary:  Leanna García is a 57 y.o. female initiated on antimicrobial therapy with IV Vancomycin for treatment of suspected skin & soft tissue infection    Drug Allergies:   Review of patient's allergies indicates:   Allergen Reactions    Dilaudid [hydromorphone] Nausea And Vomiting    Vancomycin Itching    Chlorhexidine Itching    Lortab [hydrocodone-acetaminophen] Itching       Actual Body Weight:   83 kg    Renal Function:   Estimated Creatinine Clearance: 14.6 mL/min (A) (based on SCr of 4.9 mg/dL (H)).,     Dialysis Method (if applicable):  intermittent HD    CBC (last 72 hours):  Recent Labs   Lab Result Units 08/18/22 1713 08/19/22  0649 08/20/22  0306   WBC K/uL 10.53 11.22 9.30   Hemoglobin g/dL 11.5* 9.8* 9.8*   Hematocrit % 37.6 31.5* 31.9*   Platelets K/uL 351 301 305   Gran % % 70.2 84.5* 62.0   Lymph % % 17.7* 10.2* 25.1   Mono % % 8.7 3.6* 9.6   Eosinophil % % 2.2 0.1 2.2   Basophil % % 0.7 0.4 0.5   Differential Method  Automated Automated Automated       Metabolic Panel (last 72 hours):  Recent Labs   Lab Result Units 08/18/22  1713 08/19/22  0649 08/20/22  0307   Sodium mmol/L 135* 134* 138   Potassium mmol/L 4.9 4.9 3.7   Chloride mmol/L 96 97 99   CO2 mmol/L 26 19* 31*   Glucose mg/dL 233* 354* 226*   BUN mg/dL 54* 64* 32*   Creatinine mg/dL 6.2* 7.1* 4.9*   Albumin g/dL 3.8 3.5 3.1*   Total Bilirubin mg/dL 0.5 0.9 0.5   Alkaline Phosphatase U/L 79 66 64   AST  U/L 21 14 16   ALT U/L 29 22 20   Magnesium mg/dL  --  2.2 2.0   Phosphorus mg/dL  --  5.6* 3.6       Drug levels (last 3 results):  No results for input(s): VANCOMYCINRA, VANCORANDOM, VANCOMYCINPE, VANCOPEAK, VANCOMYCINTR, VANCOTROUGH in the last 72 hours.    Microbiologic Results:  Microbiology Results (last 7 days)       ** No results found for the last 168 hours. **

## 2022-08-20 NOTE — PROGRESS NOTES
[facility] Medicine  Progress Note    Patient name: Leanna García  MRN: [id number]  Admit Date: 8/18/2022   LOS: 0 days     SUBJECTIVE:     Principal problem: Diabetic ulcer of right midfoot associated with type 2 diabetes mellitus    Interval History:  No acute overnight events reported. Nausea has resolved. Seen wound along with wound care nurse at bedside. Minimal pain at the wound sites.     Scheduled Meds:   sodium chloride 0.9%   Intravenous Once    amLODIPine  10 mg Oral Daily    aspirin  81 mg Oral Daily    atorvastatin  80 mg Oral Daily    calcium acetate(phosphat bind)  667 mg Oral TID WM    clopidogreL  75 mg Oral Daily    epoetin chris-epbx  4,400 Units Subcutaneous Every Mon, Wed, Fri    heparin (porcine)  5,000 Units Subcutaneous Q8H    hydrALAZINE  50 mg Oral Q8H    insulin detemir U-100  8 Units Subcutaneous BID    losartan  100 mg Oral Daily    metoclopramide HCl  5 mg Intravenous Q6H     Continuous Infusions:  PRN Meds:dextrose 10%, dextrose 10%, glucagon (human recombinant), glucose, glucose, hydrALAZINE, insulin aspart U-100, naloxone, ondansetron, senna-docusate 8.6-50 mg, sodium chloride 0.9%, sodium chloride 0.9%, Pharmacy to dose Vancomycin consult **AND** vancomycin - pharmacy to dose    Review of patient's allergies indicates:   Allergen Reactions    Dilaudid [hydromorphone] Nausea And Vomiting    Vancomycin Itching    Chlorhexidine Itching    Lortab [hydrocodone-acetaminophen] Itching       Review of Systems: As per interval history    OBJECTIVE:     Vital Signs (Most Recent)  Temp: 99.9 °F (37.7 °C) (08/20/22 1553)  Pulse: 73 (08/20/22 1553)  Resp: 16 (08/20/22 1553)  BP: 136/64 (08/20/22 1553)  SpO2: 95 % (08/20/22 1553)    Vital Signs Range (Last 24H):  Temp:  [97.8 °F (36.6 °C)-99.9 °F (37.7 °C)]   Pulse:  [64-79]   Resp:  [16-18]   BP: (100-147)/(42-69)   SpO2:  [94 %-97 %]     I & O (Last 24H):    Intake/Output Summary (Last 24 hours) at  8/20/2022 1827  Last data filed at 8/20/2022 1300  Gross per 24 hour   Intake 1200 ml   Output 500 ml   Net 700 ml       Physical Exam:  General: Patient resting comfortably in no acute distress. Appears as stated age. Calm  Eyes: No conjunctival injection. No scleral icterus.  ENT: Hearing grossly intact. No discharge from ears. No nasal discharge.   Neck: Supple, trachea midline. No JVD  CVS: RRR. No LE edema BL  Lungs:  No tachypnea or accessory muscle use.  Clear to auscultation bilaterally  Abdomen:  Soft, nontender and nondistended.  No organomegaly  Neuro:  Alert.  Follows commands.  Skin: Left midfoot plantar aspect ulcer noted. Left great toe medial aspect ulcer with health appearing granulation tissue bed. Left heel eschar                        Laboratory:  I have reviewed all pertinent lab results within the past 24 hours.  CBC:   Recent Labs   Lab 08/20/22  0306   WBC 9.30   RBC 3.67*   HGB 9.8*   HCT 31.9*      MCV 87   MCH 26.7*   MCHC 30.7*     BMP:   Recent Labs   Lab 08/20/22  0307   *      K 3.7   CL 99   CO2 31*   BUN 32*   CREATININE 4.9*   CALCIUM 9.3   MG 2.0       Diagnostic Results:  Labs: Reviewed    ASSESSMENT/PLAN:     Active Hospital Problems    Diagnosis  POA    *Diabetic ulcer of right midfoot associated with type 2 diabetes mellitus [E11.621, L97.419]  Yes    Gastroparesis [K31.84]  Yes    Slow transit constipation [K59.01]  Yes    Ulcer of left foot with necrosis of muscle [L97.523]  Yes    PAD (peripheral artery disease) [I73.9]  Yes    Type 2 diabetes mellitus with kidney complication, with long-term current use of insulin [E11.29, Z79.4]  Not Applicable    ESRD (end stage renal disease) [N18.6]  Yes    Hyperlipemia [E78.5]  Yes    Hypertension associated with diabetes [E11.59, I15.2]  Yes     Chronic      Resolved Hospital Problems    Diagnosis Date Resolved POA    Chest pain [R07.9] 08/19/2022 Yes         Plan:   Diabetic left foot ulcer with muscle  necrosis - recently discharged from LTAC about 1 week ago; treated with daptomycin and ampicillin-sulbactam for 4 weeks with end date of 07/19/2022  Seen by Podiatry here underwent bedside debridement.  Continue wound VAC as per Podiatry recommendations  Infectious disease consult has per Podiatry recommendations  Vancomycin ordered   Offload with floating heels  Wound care following   Intractable nausea and vomiting - conservative management with antiemetics  ESRD on HD.  Nephrology consult  Type 2 DM.  Basal insulin along with low-dose insulin sliding scale    VTE Risk Mitigation (From admission, onward)         Ordered     heparin (porcine) injection 5,000 Units  Every 8 hours         08/18/22 2311     IP VTE HIGH RISK PATIENT  Once         08/18/22 2311     Place sequential compression device  Until discontinued         08/18/22 2311                    Department Hospital Medicine  [facility]  nAdrea Montoya MD  Date of service: 08/20/2022

## 2022-08-20 NOTE — NURSING
57 yr old female    NPWT dressing change  Pt has her KCI unit to be placed on. Possible discharge tomorrow   Left mid foot and great toe medial   Dressing removed and area cleaned wound irrigated with ns  Pat dry   Left great toe 3x2x.5   Plantar great toe 1.5x1x thick eschar scab     Mid foot left 1.8x2x1 tendon exposed     Left heel  Hard dry eschar cap  Edges trimmed of dry loose eschar     Left dorsal  inprint of the dome with rub area     Great toe and mid foot  periwound applied benzoin   Drape placed to windoe the great toe   Black foam cut to fit and placed mid foot x1 with great toe x1 to bridge to the mid foot   Drape and dome placed  To suction at 125mmHg and holding    expressed his concerns for the wound vac not being placed yesterday after he just went home to get canister and supplies yesterday. He stated he will file a complaint because he feels that people no long want to do their job. They are here for the money.

## 2022-08-20 NOTE — CONSULTS
"INPATIENT NEPHROLOGY Progress Note   [facility]    Patient Name: Leanna García  Date: 08/20/2022    Reason for consultation: ESRD    Chief Complaint:   Chief Complaint   Patient presents with    Wound Infection     Diabetic pt with a right foot wound that looks to be stage II with mild redness. Pt is concerned and wanted to get it looked at "before it got too bad". Pt currently is being seen by woundcare for her other foot, presented with a wound vac.        History of Present Illness:  Leanna García is a 57 y.o. female with a history as  has a past medical history of A-fib, Anemia due to end stage renal disease (6/30/2022), Arthritis, Bronchitis, Cardiovascular event risk, ASCVD 10-year risk 6.7% (10/15/2016), Diabetes mellitus type II, Diabetic foot infection, Diabetic ulcer of left midfoot (05/05/2022), Disorder of kidney and ureter, Encounter for blood transfusion, ESRD (end stage renal disease) (05/18/2018), MANJINDER (generalized anxiety disorder) (10/25/2016), History of colon polyps (11/02/2016), Hyperlipidemia, Hypertension, and Stroke. who presented to the ED with a Wound Infection (Diabetic pt with a right foot wound that looks to be stage II with mild redness. Pt is concerned and wanted to get it looked at "before it got too bad". Pt currently is being seen by woundcare for her other foot, presented with a wound vac. )     Patient presented to the emergency for evaluation of a right foot wound check. She reports reports while she was waiting she ate a salad. Further states once she got to the ED room, she was given IV ABX and was about to be discharged when she became nauseated and started vomiting. Per the ED, she was given IV zosyn with plans to discharge.  She was then given antiemetics for the NV, but was uncontrolled. Emesis looked like undigested food, so she was then treated with Reglan. ED provider also reports patient then complained of chest pain and abdominal pain.  " "Patient reports generalized CP 20/10 PRS as describes as "feel like i'm being beating up in chest." She further states the chest pain is going all over into my stomach. While at bedside, patient started to force herself to gagged and vomit. Will admit for symptom management with antiemetics. Consult Nephrology for ESRD. Consult Dr. Bruno for diabetic foot wound.        Lab and imaging obtained and reviewed.   CBC shows hemoglobin 11.5 MCH 26.5 MCHC 30.6 RDW 15.4 lymph% 17.7.  CMP shows sodium 135 BUN 54 creatinine 6.2 GFR 7.4 glucose 233 total protein 8.6.  Chest x-ray without acute cardiopulmonary findings. XR right foot shows Degenerative changes of the foot with no acute osseous abnormality observed.     CT abdomen pelvis  IMPRESSION:   1. Incidental parapelvic gallstones.  2. Stable perinephric stranding.  3. Questionable destructive changes of the L5-S1 disc space. If an occult infection is soft tissue consider MR imaging pre and postcontrast to assess this further. This is not changed however significantly since 5/4/2022     Interval History:  8/19- thinks she has food poisoning, Dr. Bruno looking at foot wounds  8/20- wound vac replacement today, discharge per hospitalist and podiatry    Plan of Care:    Assessment:  ESRD on HD MWF  HTN  Hyponatremia  Acidosis  SHPT  Anemia of CKD    Plan:    - continue HD MWF  - continue home BP meds- adjust UF goal as needed  - renal diet, 1.5L fluid restriction  - adjust dialysate  - continue binder with meals  - continue FERMÍN with HD    Thank you for allowing us to participate in this patient's care. We will continue to follow.    Vital Signs:  Temp Readings from Last 3 Encounters:   08/20/22 97.8 °F (36.6 °C) (Axillary)   08/12/22 98 °F (36.7 °C)   07/29/22 96.7 °F (35.9 °C) (Axillary)       Pulse Readings from Last 3 Encounters:   08/20/22 64   08/12/22 66   07/29/22 75       BP Readings from Last 3 Encounters:   08/20/22 133/69   08/12/22 (!) 182/79   07/29/22 132/89 "       Weight:  Wt Readings from Last 3 Encounters:   08/20/22 83 kg (182 lb 15.7 oz)   08/07/22 87.3 kg (192 lb 7.4 oz)   07/24/22 78.9 kg (173 lb 14.4 oz)     Medications:  Scheduled Meds:   sodium chloride 0.9%   Intravenous Once    amLODIPine  10 mg Oral Daily    aspirin  81 mg Oral Daily    atorvastatin  80 mg Oral Daily    calcium acetate(phosphat bind)  667 mg Oral TID WM    clopidogreL  75 mg Oral Daily    epoetin chris-epbx  4,400 Units Subcutaneous Every Mon, Wed, Fri    heparin (porcine)  5,000 Units Subcutaneous Q8H    hydrALAZINE  50 mg Oral Q8H    insulin detemir U-100  8 Units Subcutaneous BID    losartan  100 mg Oral Daily    metoclopramide HCl  5 mg Intravenous Q6H     Continuous Infusions:  PRN Meds:.dextrose 10%, dextrose 10%, glucagon (human recombinant), glucose, glucose, hydrALAZINE, insulin aspart U-100, naloxone, ondansetron, senna-docusate 8.6-50 mg, sodium chloride 0.9%, sodium chloride 0.9%  No current facility-administered medications on file prior to encounter.     Current Outpatient Medications on File Prior to Encounter   Medication Sig Dispense Refill    amLODIPine (NORVASC) 10 MG tablet Take 1 tablet (10 mg total) by mouth once daily.      aspirin (ECOTRIN) 81 MG EC tablet Take 81 mg by mouth once daily.      atorvastatin (LIPITOR) 80 MG tablet Take 1 tablet (80 mg total) by mouth once daily. 90 tablet 3    azelastine (ASTELIN) 137 mcg (0.1 %) nasal spray 1 spray (137 mcg total) by Nasal route 2 (two) times a day. 30 mL 0    calcium acetate,phosphat bind, (PHOSLO) 667 mg capsule Take 1 capsule (667 mg total) by mouth 3 (three) times daily with meals.      cetirizine (ZYRTEC) 10 MG tablet Take 1 tablet (10 mg total) by mouth every other day.      clopidogreL (PLAVIX) 75 mg tablet Take 1 tablet (75 mg total) by mouth once daily. 30 tablet 0    epoetin chris-epbx (RETACRIT) 4,000 unit/mL injection Inject 1.11 mLs (4,440 Units total) into the skin every Mon, Wed, Fri.    "   fluticasone propionate (FLONASE) 50 mcg/actuation nasal spray 2 sprays (100 mcg total) by Each Nostril route once daily.      gabapentin (NEURONTIN) 100 MG capsule Take 1 capsule (100 mg total) by mouth 2 (two) times daily.      hydrALAZINE (APRESOLINE) 50 MG tablet Take 1 tablet (50 mg total) by mouth every 8 (eight) hours. 90 tablet 0    insulin aspart U-100 (NOVOLOG FLEXPEN U-100 INSULIN) 100 unit/mL (3 mL) InPn pen Inject 5-8 units 3 times per day with food. (Patient taking differently: Inject 5-8 Units into the skin 3 (three) times daily with meals. Inject 5-8 units 3 times per day with food.) 1 Box 6    insulin detemir U-100 (LEVEMIR FLEXTOUCH U-100 INSULN) 100 unit/mL (3 mL) InPn pen Inject 15 Units into the skin every evening.      lactulose (CHRONULAC) 10 gram/15 mL solution Take 20 g by mouth 2 (two) times a day.      losartan (COZAAR) 100 MG tablet Take 1 tablet (100 mg total) by mouth once daily.      ondansetron (ZOFRAN-ODT) 8 MG TbDL Take 1 tablet (8 mg total) by mouth every 8 (eight) hours as needed (nausea).      oxyCODONE (ROXICODONE) 5 MG immediate release tablet Take 1 tablet (5 mg total) by mouth every 6 (six) hours as needed for Pain.      polyethylene glycol (GLYCOLAX) 17 gram PwPk Take 17 g by mouth 2 (two) times daily as needed.      senna-docusate 8.6-50 mg (PERICOLACE) 8.6-50 mg per tablet Take 1 tablet by mouth 2 (two) times daily.      blood sugar diagnostic Strp To check BG 4 times daily, to use with insurance preferred meter (Patient taking differently: 1 strip by Misc.(Non-Drug; Combo Route) route 4 (four) times daily. To check BG 4 times daily, to use with insurance preferred meter) 450 strip 3    pen needle, diabetic (BD ULTRA-FINE ROBERT PEN NEEDLE) 32 gauge x 5/32" Ndle To use 4 times per day with insulin injections. (Patient taking differently: 1 pen by Misc.(Non-Drug; Combo Route) route 4 (four) times daily. To use 4 times per day with insulin injections.) 450 each " 2    [DISCONTINUED] blood-glucose meter (ACCU-CHEK KAYLIE PLUS METER) Misc To use to check blood sugars 4 times a day 1 each 0    [DISCONTINUED] clorazepate (TRANXENE) 3.75 MG Tab Take 7.5 mg by mouth nightly as needed.       [DISCONTINUED] dextrose (GLUCOSE GEL) 40 % gel Take 37.5 mLs (15,000 mg total) by mouth once as needed (hypoglycemia). 37.5 g 4    [DISCONTINUED] ezetimibe (ZETIA) 10 mg tablet Take 1 tablet (10 mg total) by mouth once daily.      [DISCONTINUED] fenofibrate 160 MG Tab Take 1 tablet (160 mg total) by mouth once daily. 90 tablet 3    [DISCONTINUED] lancing device with lancets (ACCU-CHEK SOFT DEV LANCETS) Kit To check blood sugars 4 times per day 400 each 3    [DISCONTINUED] pantoprazole (PROTONIX) 40 MG tablet Take 1 tablet (40 mg total) by mouth once daily.         Review of Systems:  Neg    Physical Exam:  Constitutional: nad, aao x 3  Heart: rrr, no m/r/g, no edema  Lungs: ctab, no w/r/r/c, no lb  Abdomen: s/nt/nd, +BS    Results:  Lab Results   Component Value Date     08/20/2022    K 3.7 08/20/2022    CL 99 08/20/2022    CO2 31 (H) 08/20/2022    BUN 32 (H) 08/20/2022    CREATININE 4.9 (H) 08/20/2022    CALCIUM 9.3 08/20/2022    ANIONGAP 8 08/20/2022    ESTGFRAFRICA 5 (A) 07/29/2022    EGFRNONAA 5 (A) 07/29/2022       Lab Results   Component Value Date    CALCIUM 9.3 08/20/2022    PHOS 3.6 08/20/2022       Recent Labs   Lab 08/20/22  0306   WBC 9.30   RBC 3.67*   HGB 9.8*   HCT 31.9*      MCV 87   MCH 26.7*   MCHC 30.7*       I have personally reviewed pertinent radiological imaging and reports.    I have spent > 35 minutes providing care for this patient for the above diagnoses. These services have included chart/data/imaging review, evaluation, exam, formulation of plan, , note preparation, and discussions with staff involved in this patient's care.    Ashleigh Soria MD MPH  [facility] [address] [address]  [phone]  (p) [phone] (f)

## 2022-08-20 NOTE — CONSULTS
Consult received  Well known to me  Photos reviewed, from 8/9 and now after debridement by Dr. Bruno  Completed roughly 2 and 1/2 months of IV antibiotics on  7/19  daptomycin was ordered and then cancelled . She received one dose of zosyn on admission/ED    No cultures of the left great toe have been submitted.  Will order an xray  I have no recommendations at this time. I am not opposed to holding antibiotics given no fever or leukocytosis, but will evaluate tomorrow at bedside.   If she is being discharged today, please call me

## 2022-08-21 LAB
ALBUMIN SERPL BCP-MCNC: 3 G/DL (ref 3.5–5.2)
ALP SERPL-CCNC: 57 U/L (ref 55–135)
ALT SERPL W/O P-5'-P-CCNC: 16 U/L (ref 10–44)
ANION GAP SERPL CALC-SCNC: 11 MMOL/L (ref 8–16)
AST SERPL-CCNC: 13 U/L (ref 10–40)
BASOPHILS # BLD AUTO: 0.08 K/UL (ref 0–0.2)
BASOPHILS NFR BLD: 0.8 % (ref 0–1.9)
BILIRUB SERPL-MCNC: 0.6 MG/DL (ref 0.1–1)
BUN SERPL-MCNC: 49 MG/DL (ref 6–20)
CALCIUM SERPL-MCNC: 9.2 MG/DL (ref 8.7–10.5)
CHLORIDE SERPL-SCNC: 98 MMOL/L (ref 95–110)
CO2 SERPL-SCNC: 27 MMOL/L (ref 23–29)
CREAT SERPL-MCNC: 6.8 MG/DL (ref 0.5–1.4)
DIFFERENTIAL METHOD: ABNORMAL
EOSINOPHIL # BLD AUTO: 0.3 K/UL (ref 0–0.5)
EOSINOPHIL NFR BLD: 3 % (ref 0–8)
ERYTHROCYTE [DISTWIDTH] IN BLOOD BY AUTOMATED COUNT: 15.1 % (ref 11.5–14.5)
EST. GFR  (NO RACE VARIABLE): 6.6 ML/MIN/1.73 M^2
GLUCOSE SERPL-MCNC: 180 MG/DL (ref 70–110)
GLUCOSE SERPL-MCNC: 186 MG/DL (ref 70–110)
GLUCOSE SERPL-MCNC: 208 MG/DL (ref 70–110)
GLUCOSE SERPL-MCNC: 208 MG/DL (ref 70–110)
GLUCOSE SERPL-MCNC: 213 MG/DL (ref 70–110)
GLUCOSE SERPL-MCNC: 217 MG/DL (ref 70–110)
HCT VFR BLD AUTO: 31.1 % (ref 37–48.5)
HGB BLD-MCNC: 9.6 G/DL (ref 12–16)
IMM GRANULOCYTES # BLD AUTO: 0.05 K/UL (ref 0–0.04)
IMM GRANULOCYTES NFR BLD AUTO: 0.5 % (ref 0–0.5)
LYMPHOCYTES # BLD AUTO: 2.9 K/UL (ref 1–4.8)
LYMPHOCYTES NFR BLD: 27.8 % (ref 18–48)
MAGNESIUM SERPL-MCNC: 2 MG/DL (ref 1.6–2.6)
MCH RBC QN AUTO: 26.7 PG (ref 27–31)
MCHC RBC AUTO-ENTMCNC: 30.9 G/DL (ref 32–36)
MCV RBC AUTO: 87 FL (ref 82–98)
MONOCYTES # BLD AUTO: 1 K/UL (ref 0.3–1)
MONOCYTES NFR BLD: 10 % (ref 4–15)
NEUTROPHILS # BLD AUTO: 5.9 K/UL (ref 1.8–7.7)
NEUTROPHILS NFR BLD: 57.9 % (ref 38–73)
NRBC BLD-RTO: 0 /100 WBC
PHOSPHATE SERPL-MCNC: 4.1 MG/DL (ref 2.7–4.5)
PLATELET # BLD AUTO: 303 K/UL (ref 150–450)
PMV BLD AUTO: 10.2 FL (ref 9.2–12.9)
POTASSIUM SERPL-SCNC: 4.2 MMOL/L (ref 3.5–5.1)
PROT SERPL-MCNC: 6.5 G/DL (ref 6–8.4)
RBC # BLD AUTO: 3.59 M/UL (ref 4–5.4)
SODIUM SERPL-SCNC: 136 MMOL/L (ref 136–145)
VANCOMYCIN SERPL-MCNC: 25.7 UG/ML
WBC # BLD AUTO: 10.26 K/UL (ref 3.9–12.7)

## 2022-08-21 PROCEDURE — 80053 COMPREHEN METABOLIC PANEL: CPT | Performed by: NURSE PRACTITIONER

## 2022-08-21 PROCEDURE — 83735 ASSAY OF MAGNESIUM: CPT | Performed by: NURSE PRACTITIONER

## 2022-08-21 PROCEDURE — 25000003 PHARM REV CODE 250: Performed by: NURSE PRACTITIONER

## 2022-08-21 PROCEDURE — 99222 PR INITIAL HOSPITAL CARE,LEVL II: ICD-10-PCS | Mod: ,,, | Performed by: INTERNAL MEDICINE

## 2022-08-21 PROCEDURE — 63600175 PHARM REV CODE 636 W HCPCS: Performed by: NURSE PRACTITIONER

## 2022-08-21 PROCEDURE — 36415 COLL VENOUS BLD VENIPUNCTURE: CPT | Performed by: INTERNAL MEDICINE

## 2022-08-21 PROCEDURE — 25000003 PHARM REV CODE 250: Performed by: INTERNAL MEDICINE

## 2022-08-21 PROCEDURE — 36415 COLL VENOUS BLD VENIPUNCTURE: CPT | Performed by: NURSE PRACTITIONER

## 2022-08-21 PROCEDURE — 82962 GLUCOSE BLOOD TEST: CPT

## 2022-08-21 PROCEDURE — 99222 1ST HOSP IP/OBS MODERATE 55: CPT | Mod: ,,, | Performed by: INTERNAL MEDICINE

## 2022-08-21 PROCEDURE — 85025 COMPLETE CBC W/AUTO DIFF WBC: CPT | Performed by: NURSE PRACTITIONER

## 2022-08-21 PROCEDURE — 80202 ASSAY OF VANCOMYCIN: CPT | Performed by: INTERNAL MEDICINE

## 2022-08-21 PROCEDURE — 84100 ASSAY OF PHOSPHORUS: CPT | Performed by: NURSE PRACTITIONER

## 2022-08-21 PROCEDURE — 12000002 HC ACUTE/MED SURGE SEMI-PRIVATE ROOM

## 2022-08-21 RX ORDER — METOCLOPRAMIDE 5 MG/1
5 TABLET ORAL
Status: DISCONTINUED | OUTPATIENT
Start: 2022-08-21 | End: 2022-08-30 | Stop reason: HOSPADM

## 2022-08-21 RX ADMIN — HEPARIN SODIUM 5000 UNITS: 5000 INJECTION INTRAVENOUS; SUBCUTANEOUS at 09:08

## 2022-08-21 RX ADMIN — METOCLOPRAMIDE 5 MG: 5 INJECTION, SOLUTION INTRAMUSCULAR; INTRAVENOUS at 06:08

## 2022-08-21 RX ADMIN — INSULIN ASPART 2 UNITS: 100 INJECTION, SOLUTION INTRAVENOUS; SUBCUTANEOUS at 06:08

## 2022-08-21 RX ADMIN — HYDRALAZINE HYDROCHLORIDE 50 MG: 25 TABLET ORAL at 06:08

## 2022-08-21 RX ADMIN — HEPARIN SODIUM 5000 UNITS: 5000 INJECTION INTRAVENOUS; SUBCUTANEOUS at 06:08

## 2022-08-21 RX ADMIN — CLOPIDOGREL BISULFATE 75 MG: 75 TABLET, FILM COATED ORAL at 08:08

## 2022-08-21 RX ADMIN — HYDRALAZINE HYDROCHLORIDE 50 MG: 25 TABLET ORAL at 03:08

## 2022-08-21 RX ADMIN — INSULIN ASPART 2 UNITS: 100 INJECTION, SOLUTION INTRAVENOUS; SUBCUTANEOUS at 12:08

## 2022-08-21 RX ADMIN — METOCLOPRAMIDE 5 MG: 5 TABLET ORAL at 09:08

## 2022-08-21 RX ADMIN — METOCLOPRAMIDE 5 MG: 5 INJECTION, SOLUTION INTRAMUSCULAR; INTRAVENOUS at 12:08

## 2022-08-21 RX ADMIN — MELATONIN 6 MG: at 12:08

## 2022-08-21 RX ADMIN — CALCIUM ACETATE 667 MG: 667 CAPSULE ORAL at 12:08

## 2022-08-21 RX ADMIN — HYDRALAZINE HYDROCHLORIDE 50 MG: 25 TABLET ORAL at 09:08

## 2022-08-21 RX ADMIN — CALCIUM ACETATE 667 MG: 667 CAPSULE ORAL at 08:08

## 2022-08-21 RX ADMIN — ATORVASTATIN CALCIUM 80 MG: 40 TABLET, FILM COATED ORAL at 08:08

## 2022-08-21 RX ADMIN — OXYCODONE HYDROCHLORIDE 5 MG: 5 TABLET ORAL at 06:08

## 2022-08-21 RX ADMIN — CALCIUM ACETATE 667 MG: 667 CAPSULE ORAL at 06:08

## 2022-08-21 RX ADMIN — METOCLOPRAMIDE 5 MG: 5 TABLET ORAL at 06:08

## 2022-08-21 RX ADMIN — ASPIRIN 81 MG: 81 TABLET, COATED ORAL at 08:08

## 2022-08-21 RX ADMIN — HEPARIN SODIUM 5000 UNITS: 5000 INJECTION INTRAVENOUS; SUBCUTANEOUS at 03:08

## 2022-08-21 NOTE — PROGRESS NOTES
Consult received  Well known to me  Photos reviewed, from 8/9 and now after debridement by Dr. Bruno  Completed roughly 2 and 1/2 months of IV antibiotics on  7/19  daptomycin was ordered and then cancelled . She received one dose of zosyn on admission/ED    No cultures of the left great toe have been submitted.  Will order an xray  I have no recommendations at this time. I am not opposed to holding antibiotics given no fever or leukocytosis, but will evaluate tomorrow at bedside.   If she is being discharged today, please call me      8/21: new wound photos reviewed. Wound vac in place obscures exam. She received vanc yesterday.   8/20 7/25           As best I can recall, her distal great toe has been erythematous but it is more so now  PLAN:  I am getting an xray. It is reasonable to continue vanc while here  I would like to try to see it without th ewound vac tomorrow     ASSESSMENT:  Left diabetic foot ulcer, not obviously infected  PAD with poor healing  ESRD  DM with multiple complications    Review of patient's allergies indicates:   Allergen Reactions    Dilaudid [hydromorphone] Nausea And Vomiting    Vancomycin Itching    Chlorhexidine Itching    Lortab [hydrocodone-acetaminophen] Itching     Past Medical History:   Diagnosis Date    A-fib     resolved per patient    Anemia due to end stage renal disease 6/30/2022    Arthritis     Bronchitis     Cardiovascular event risk, ASCVD 10-year risk 6.7% 10/15/2016    Diabetes mellitus type II     Diabetic foot infection     Diabetic ulcer of left midfoot 05/05/2022    Disorder of kidney and ureter     Encounter for blood transfusion     ESRD (end stage renal disease) 05/18/2018    MANJINDER (generalized anxiety disorder) 10/25/2016    History of colon polyps 11/02/2016    Hyperlipidemia     Hypertension     Stroke      Past Surgical History:   Procedure Laterality Date    COLONOSCOPY N/A 10/5/2016    Procedure: COLONOSCOPY;  Surgeon: Pillo  YAHAIRA Chanel MD;  Location: [facility] ENDO;  Service: Endoscopy;  Laterality: N/A;    COLONOSCOPY N/A 4/26/2022    Procedure: COLONOSCOPY;  Surgeon: Saravanan Rachel MD;  Location: [facility] ENDO;  Service: Endoscopy;  Laterality: N/A;    HERNIA REPAIR Bilateral 11/22/2016    inguinal    HYSTERECTOMY      INCISION AND DRAINAGE FOOT Left 5/13/2022    Procedure: INCISION AND DRAINAGE, FOOT;  Surgeon: Ricardo Bruno DPM;  Location: [facility] OR;  Service: Podiatry;  Laterality: Left;    OOPHORECTOMY       Social History     Socioeconomic History    Marital status:    Tobacco Use    Smoking status: Never Smoker    Smokeless tobacco: Never Used   Substance and Sexual Activity    Alcohol use: No    Drug use: No    Sexual activity: Yes     Partners: Male   Social History Narrative    Caregiver      Family History   Problem Relation Age of Onset    Hyperlipidemia Mother     Hypertension Mother     Hypertension Father     Diabetes Father     Diabetes Sister     Diabetes Sister     Diabetes Maternal Aunt     Diabetes Maternal Grandmother     Heart disease Maternal Grandmother     Cancer Paternal Grandmother     Breast cancer Neg Hx     Colon cancer Neg Hx     Ovarian cancer Neg Hx        EXAM & DIAGNOSTICS REVIEWED:   Vitals:     Temp:  [98 °F (36.7 °C)-99.2 °F (37.3 °C)]   Temp: 98.1 °F (36.7 °C) (08/21/22 2001)  Pulse: 74 (08/21/22 2001)  Resp: 17 (08/21/22 2001)  BP: 136/73 (08/21/22 2001)  SpO2: 95 % (08/21/22 2001)    Intake/Output Summary (Last 24 hours) at 8/21/2022 2101  Last data filed at 8/21/2022 1300  Gross per 24 hour   Intake 800 ml   Output 50 ml   Net 750 ml        General Labs reviewed:  Recent Labs   Lab 08/19/22  0649 08/20/22  0306 08/21/22  0331   WBC 11.22 9.30 10.26   HGB 9.8* 9.8* 9.6*   HCT 31.5* 31.9* 31.1*    305 303       Recent Labs   Lab 08/19/22  0649 08/20/22  0307 08/21/22  0331   * 138 136   K 4.9 3.7 4.2   CL 97 99 98   CO2 19* 31* 27   BUN 64* 32* 49*    CREATININE 7.1* 4.9* 6.8*   CALCIUM 9.3 9.3 9.2   PROT 7.7 6.9 6.5   BILITOT 0.9 0.5 0.6   ALKPHOS 66 64 57   ALT 22 20 16   AST 14 16 13     No results for input(s): CRP in the last 168 hours.      Micro:  Microbiology Results (last 7 days)     ** No results found for the last 168 hours. **          Imaging Reviewed:    foot xray

## 2022-08-21 NOTE — PROGRESS NOTES
Formerly [facility], [facility] Medicine  Progress Note    Patient name: Leanna García  MRN: [id number]  Admit Date: 8/18/2022   LOS: 1 day     SUBJECTIVE:     Principal problem: Diabetic ulcer of right midfoot associated with type 2 diabetes mellitus    Interval History:  No acute overnight events reported. Nausea has resolved.  Minimal discomfort at the left foot wound sites.  Wound VAC in place.  Tolerating antibiotics without issues.      Scheduled Meds:   amLODIPine  10 mg Oral Daily    aspirin  81 mg Oral Daily    atorvastatin  80 mg Oral Daily    calcium acetate(phosphat bind)  667 mg Oral TID WM    clopidogreL  75 mg Oral Daily    epoetin chris-epbx  4,400 Units Subcutaneous Every Mon, Wed, Fri    heparin (porcine)  5,000 Units Subcutaneous Q8H    hydrALAZINE  50 mg Oral Q8H    insulin detemir U-100  10 Units Subcutaneous BID    losartan  100 mg Oral Daily    metoclopramide HCl  5 mg Oral QID (AC & HS)     Continuous Infusions:  PRN Meds:dextrose 10%, dextrose 10%, glucagon (human recombinant), glucose, glucose, hydrALAZINE, insulin aspart U-100, melatonin, naloxone, ondansetron, oxyCODONE, senna-docusate 8.6-50 mg, sodium chloride 0.9%, sodium chloride 0.9%, Pharmacy to dose Vancomycin consult **AND** vancomycin - pharmacy to dose    Review of patient's allergies indicates:   Allergen Reactions    Dilaudid [hydromorphone] Nausea And Vomiting    Vancomycin Itching    Chlorhexidine Itching    Lortab [hydrocodone-acetaminophen] Itching       Review of Systems: As per interval history    OBJECTIVE:     Vital Signs (Most Recent)  Temp: 98 °F (36.7 °C) (08/21/22 1503)  Pulse: 71 (08/21/22 1503)  Resp: 18 (08/21/22 1503)  BP: (!) 184/75 (08/21/22 1503)  SpO2: 95 % (08/21/22 1503)    Vital Signs Range (Last 24H):  Temp:  [98 °F (36.7 °C)-99.2 °F (37.3 °C)]   Pulse:  [65-75]   Resp:  [17-20]   BP: (110-184)/(54-75)   SpO2:  [94 %-96 %]     I & O (Last 24H):    Intake/Output Summary (Last 24 hours)  at 8/21/2022 1617  Last data filed at 8/21/2022 1300  Gross per 24 hour   Intake 800 ml   Output 50 ml   Net 750 ml       Physical Exam:  General: Patient resting comfortably in no acute distress. Appears as stated age. Calm  Eyes: No conjunctival injection. No scleral icterus.  ENT: Hearing grossly intact. No discharge from ears. No nasal discharge.   Neck: Supple, trachea midline. No JVD  CVS: RRR. No LE edema BL  Lungs:  No tachypnea or accessory muscle use.  Clear to auscultation bilaterally  Abdomen:  Soft, nontender and nondistended.  No organomegaly  Neuro:  Alert.  Follows commands.  Skin:  Left foot dressing with wound VAC in place.  Clean, dry and intact.    Laboratory:  I have reviewed all pertinent lab results within the past 24 hours.  CBC:   Recent Labs   Lab 08/21/22  0331   WBC 10.26   RBC 3.59*   HGB 9.6*   HCT 31.1*      MCV 87   MCH 26.7*   MCHC 30.9*     BMP:   Recent Labs   Lab 08/21/22 0331   *      K 4.2   CL 98   CO2 27   BUN 49*   CREATININE 6.8*   CALCIUM 9.2   MG 2.0       Diagnostic Results:  Labs: Reviewed    ASSESSMENT/PLAN:     Active Hospital Problems    Diagnosis  POA    *Diabetic ulcer of right midfoot associated with type 2 diabetes mellitus [E11.621, L97.419]  Yes    Gastroparesis [K31.84]  Yes    Slow transit constipation [K59.01]  Yes    Ulcer of left foot with necrosis of muscle [L97.523]  Yes    PAD (peripheral artery disease) [I73.9]  Yes    Type 2 diabetes mellitus with kidney complication, with long-term current use of insulin [E11.29, Z79.4]  Not Applicable    ESRD (end stage renal disease) [N18.6]  Yes    Hyperlipemia [E78.5]  Yes    Hypertension associated with diabetes [E11.59, I15.2]  Yes     Chronic      Resolved Hospital Problems    Diagnosis Date Resolved POA    Chest pain [R07.9] 08/19/2022 Yes         Plan:   Diabetic left foot ulcer with muscle necrosis - recently discharged from LTAC about 1 week ago; treated with daptomycin and  ampicillin-sulbactam for 4 weeks with end date of 07/19/2022   Discussed with infectious disease provider who is aware of the patient.  Patient received a total of 10 weeks of antibiotics in the last 3 months  Seen by Podiatry here underwent bedside debridement.  Continue wound VAC as per Podiatry recommendations  Infectious disease consulted has per Podiatry recommendations  Continue vancomycin as per renal dosing  Offload with floating heels  Wound care following   ESRD on HD.  Nephrology consult  Type 2 DM.  Basal insulin along with low-dose insulin sliding scale.  Increase detemir to 10 units b.i.d.    Discuss care with Nephrology, Infectious Disease and  at bedside    VTE Risk Mitigation (From admission, onward)         Ordered     heparin (porcine) injection 5,000 Units  Every 8 hours         08/18/22 2311     IP VTE HIGH RISK PATIENT  Once         08/18/22 2311     Place sequential compression device  Until discontinued         08/18/22 2311                    Department Hospital Medicine  [facility]  Andrea Montoya MD  Date of service: 08/21/2022

## 2022-08-21 NOTE — PROGRESS NOTES
Pharmacokinetic Assessment Follow Up: IV Vancomycin    Patient brief summary:  Leanna García is a 57 y.o. female initiated on antimicrobial therapy with IV Vancomycin for treatment of Skin & Soft Tissue infection    The patient's current regimen is Vancomycin 1500mg intermittently (Hemodialysis).      Actual Body Weight = 85.7 kg (188 lb 15 oz).    Renal Function:   Estimated Creatinine Clearance: 10.7 mL/min (A) (based on SCr of 6.8 mg/dL (H)).      Vancomycin serum concentration assessment(s):    The random level was drawn correctly and can be used to guide therapy at this time. The measurement is above the desired definitive target range of 10 to 15 mcg/mL.    Vancomycin Regimen Plan:    Re-dose when the random level is less than 15 mcg/mL, next level to be drawn at 04:30 on 8/23.    Drug levels (last 3 results):  Recent Labs   Lab Result Units 08/21/22  1423   Vancomycin, Random ug/mL 25.7          Dialysis Method (if applicable):  intermittent HD;MWF schedule       Pharmacy will continue to follow and monitor vancomycin.    Please contact pharmacy at extension [phone] for questions regarding this assessment.    Thank you for the consult,   Keyla Franklin

## 2022-08-21 NOTE — CONSULTS
"INPATIENT NEPHROLOGY Progress Note   [facility]    Patient Name: Leanna García  Date: 08/21/2022    Reason for consultation: ESRD    Chief Complaint:   Chief Complaint   Patient presents with    Wound Infection     Diabetic pt with a right foot wound that looks to be stage II with mild redness. Pt is concerned and wanted to get it looked at "before it got too bad". Pt currently is being seen by woundcare for her other foot, presented with a wound vac.        History of Present Illness:  eLanna García is a 57 y.o. female with a history as  has a past medical history of A-fib, Anemia due to end stage renal disease (6/30/2022), Arthritis, Bronchitis, Cardiovascular event risk, ASCVD 10-year risk 6.7% (10/15/2016), Diabetes mellitus type II, Diabetic foot infection, Diabetic ulcer of left midfoot (05/05/2022), Disorder of kidney and ureter, Encounter for blood transfusion, ESRD (end stage renal disease) (05/18/2018), MANJINDER (generalized anxiety disorder) (10/25/2016), History of colon polyps (11/02/2016), Hyperlipidemia, Hypertension, and Stroke. who presented to the ED with a Wound Infection (Diabetic pt with a right foot wound that looks to be stage II with mild redness. Pt is concerned and wanted to get it looked at "before it got too bad". Pt currently is being seen by woundcare for her other foot, presented with a wound vac. )     Patient presented to the emergency for evaluation of a right foot wound check. She reports reports while she was waiting she ate a salad. Further states once she got to the ED room, she was given IV ABX and was about to be discharged when she became nauseated and started vomiting. Per the ED, she was given IV zosyn with plans to discharge.  She was then given antiemetics for the NV, but was uncontrolled. Emesis looked like undigested food, so she was then treated with Reglan. ED provider also reports patient then complained of chest pain and abdominal pain.  " "Patient reports generalized CP 20/10 PRS as describes as "feel like i'm being beating up in chest." She further states the chest pain is going all over into my stomach. While at bedside, patient started to force herself to gagged and vomit. Will admit for symptom management with antiemetics. Consult Nephrology for ESRD. Consult Dr. Bruno for diabetic foot wound.        Lab and imaging obtained and reviewed.   CBC shows hemoglobin 11.5 MCH 26.5 MCHC 30.6 RDW 15.4 lymph% 17.7.  CMP shows sodium 135 BUN 54 creatinine 6.2 GFR 7.4 glucose 233 total protein 8.6.  Chest x-ray without acute cardiopulmonary findings. XR right foot shows Degenerative changes of the foot with no acute osseous abnormality observed.     CT abdomen pelvis  IMPRESSION:   1. Incidental parapelvic gallstones.  2. Stable perinephric stranding.  3. Questionable destructive changes of the L5-S1 disc space. If an occult infection is soft tissue consider MR imaging pre and postcontrast to assess this further. This is not changed however significantly since 5/4/2022     Interval History:  8/19- thinks she has food poisoning, Dr. Bruno looking at foot wounds  8/20- wound vac replacement today, discharge per hospitalist and podiatry  8/21- had debridement and wound vac placement yest- discharge per hospitalist and podiatry    Plan of Care:    Assessment:  ESRD on HD MWF  HTN  Hyponatremia  Acidosis  SHPT  Anemia of CKD    Plan:    - continue HD MWF  - continue home BP meds- adjust UF goal as needed  - renal diet, 1.5L fluid restriction  - adjust dialysate  - continue binder with meals  - continue FERMÍN with HD    Thank you for allowing us to participate in this patient's care. We will continue to follow.    Vital Signs:  Temp Readings from Last 3 Encounters:   08/21/22 98.1 °F (36.7 °C) (Oral)   08/12/22 98 °F (36.7 °C)   07/29/22 96.7 °F (35.9 °C) (Axillary)       Pulse Readings from Last 3 Encounters:   08/21/22 65   08/12/22 66   07/29/22 75       BP " Readings from Last 3 Encounters:   08/21/22 (!) 119/54   08/12/22 (!) 182/79   07/29/22 132/89       Weight:  Wt Readings from Last 3 Encounters:   08/21/22 85.7 kg (188 lb 15 oz)   08/07/22 87.3 kg (192 lb 7.4 oz)   07/24/22 78.9 kg (173 lb 14.4 oz)     Medications:  Scheduled Meds:   sodium chloride 0.9%   Intravenous Once    amLODIPine  10 mg Oral Daily    aspirin  81 mg Oral Daily    atorvastatin  80 mg Oral Daily    calcium acetate(phosphat bind)  667 mg Oral TID WM    clopidogreL  75 mg Oral Daily    epoetin chris-epbx  4,400 Units Subcutaneous Every Mon, Wed, Fri    heparin (porcine)  5,000 Units Subcutaneous Q8H    hydrALAZINE  50 mg Oral Q8H    insulin detemir U-100  10 Units Subcutaneous BID    losartan  100 mg Oral Daily    metoclopramide HCl  5 mg Intravenous Q6H     Continuous Infusions:  PRN Meds:.dextrose 10%, dextrose 10%, glucagon (human recombinant), glucose, glucose, hydrALAZINE, insulin aspart U-100, melatonin, naloxone, ondansetron, oxyCODONE, senna-docusate 8.6-50 mg, sodium chloride 0.9%, sodium chloride 0.9%, Pharmacy to dose Vancomycin consult **AND** vancomycin - pharmacy to dose  No current facility-administered medications on file prior to encounter.     Current Outpatient Medications on File Prior to Encounter   Medication Sig Dispense Refill    amLODIPine (NORVASC) 10 MG tablet Take 1 tablet (10 mg total) by mouth once daily.      aspirin (ECOTRIN) 81 MG EC tablet Take 81 mg by mouth once daily.      atorvastatin (LIPITOR) 80 MG tablet Take 1 tablet (80 mg total) by mouth once daily. 90 tablet 3    azelastine (ASTELIN) 137 mcg (0.1 %) nasal spray 1 spray (137 mcg total) by Nasal route 2 (two) times a day. 30 mL 0    calcium acetate,phosphat bind, (PHOSLO) 667 mg capsule Take 1 capsule (667 mg total) by mouth 3 (three) times daily with meals.      cetirizine (ZYRTEC) 10 MG tablet Take 1 tablet (10 mg total) by mouth every other day.      clopidogreL (PLAVIX) 75 mg  "tablet Take 1 tablet (75 mg total) by mouth once daily. 30 tablet 0    epoetin chris-epbx (RETACRIT) 4,000 unit/mL injection Inject 1.11 mLs (4,440 Units total) into the skin every Mon, Wed, Fri.      fluticasone propionate (FLONASE) 50 mcg/actuation nasal spray 2 sprays (100 mcg total) by Each Nostril route once daily.      gabapentin (NEURONTIN) 100 MG capsule Take 1 capsule (100 mg total) by mouth 2 (two) times daily.      hydrALAZINE (APRESOLINE) 50 MG tablet Take 1 tablet (50 mg total) by mouth every 8 (eight) hours. 90 tablet 0    insulin aspart U-100 (NOVOLOG FLEXPEN U-100 INSULIN) 100 unit/mL (3 mL) InPn pen Inject 5-8 units 3 times per day with food. (Patient taking differently: Inject 5-8 Units into the skin 3 (three) times daily with meals. Inject 5-8 units 3 times per day with food.) 1 Box 6    insulin detemir U-100 (LEVEMIR FLEXTOUCH U-100 INSULN) 100 unit/mL (3 mL) InPn pen Inject 15 Units into the skin every evening.      lactulose (CHRONULAC) 10 gram/15 mL solution Take 20 g by mouth 2 (two) times a day.      losartan (COZAAR) 100 MG tablet Take 1 tablet (100 mg total) by mouth once daily.      ondansetron (ZOFRAN-ODT) 8 MG TbDL Take 1 tablet (8 mg total) by mouth every 8 (eight) hours as needed (nausea).      oxyCODONE (ROXICODONE) 5 MG immediate release tablet Take 1 tablet (5 mg total) by mouth every 6 (six) hours as needed for Pain.      polyethylene glycol (GLYCOLAX) 17 gram PwPk Take 17 g by mouth 2 (two) times daily as needed.      senna-docusate 8.6-50 mg (PERICOLACE) 8.6-50 mg per tablet Take 1 tablet by mouth 2 (two) times daily.      blood sugar diagnostic Strp To check BG 4 times daily, to use with insurance preferred meter (Patient taking differently: 1 strip by Misc.(Non-Drug; Combo Route) route 4 (four) times daily. To check BG 4 times daily, to use with insurance preferred meter) 450 strip 3    pen needle, diabetic (BD ULTRA-FINE ROBERT PEN NEEDLE) 32 gauge x 5/32" Ndle To " use 4 times per day with insulin injections. (Patient taking differently: 1 pen by Misc.(Non-Drug; Combo Route) route 4 (four) times daily. To use 4 times per day with insulin injections.) 450 each 2    [DISCONTINUED] blood-glucose meter (ACCU-CHEK KAYLIE PLUS METER) Misc To use to check blood sugars 4 times a day 1 each 0    [DISCONTINUED] clorazepate (TRANXENE) 3.75 MG Tab Take 7.5 mg by mouth nightly as needed.       [DISCONTINUED] dextrose (GLUCOSE GEL) 40 % gel Take 37.5 mLs (15,000 mg total) by mouth once as needed (hypoglycemia). 37.5 g 4    [DISCONTINUED] ezetimibe (ZETIA) 10 mg tablet Take 1 tablet (10 mg total) by mouth once daily.      [DISCONTINUED] fenofibrate 160 MG Tab Take 1 tablet (160 mg total) by mouth once daily. 90 tablet 3    [DISCONTINUED] lancing device with lancets (ACCU-CHEK SOFT DEV LANCETS) Kit To check blood sugars 4 times per day 400 each 3    [DISCONTINUED] pantoprazole (PROTONIX) 40 MG tablet Take 1 tablet (40 mg total) by mouth once daily.         Review of Systems:  Neg    Physical Exam:  Constitutional: nad, aao x 3  Heart: rrr, no m/r/g, no edema  Lungs: ctab, no w/r/r/c, no lb  Abdomen: s/nt/nd, +BS    Results:  Lab Results   Component Value Date     08/21/2022    K 4.2 08/21/2022    CL 98 08/21/2022    CO2 27 08/21/2022    BUN 49 (H) 08/21/2022    CREATININE 6.8 (H) 08/21/2022    CALCIUM 9.2 08/21/2022    ANIONGAP 11 08/21/2022    ESTGFRAFRICA 5 (A) 07/29/2022    EGFRNONAA 5 (A) 07/29/2022       Lab Results   Component Value Date    CALCIUM 9.2 08/21/2022    PHOS 4.1 08/21/2022       Recent Labs   Lab 08/21/22  0331   WBC 10.26   RBC 3.59*   HGB 9.6*   HCT 31.1*      MCV 87   MCH 26.7*   MCHC 30.9*       I have personally reviewed pertinent radiological imaging and reports.    I have spent > 35 minutes providing care for this patient for the above diagnoses. These services have included chart/data/imaging review, evaluation, exam, formulation of plan, order  entry, note preparation, and discussions with staff involved in this patient's care.    Ashleigh Soria MD MPH  [facility] [address] [address]  [phone] (p)  [phone] (f)

## 2022-08-22 LAB
ALBUMIN SERPL BCP-MCNC: 3.1 G/DL (ref 3.5–5.2)
ALP SERPL-CCNC: 58 U/L (ref 55–135)
ALT SERPL W/O P-5'-P-CCNC: 17 U/L (ref 10–44)
ANION GAP SERPL CALC-SCNC: 10 MMOL/L (ref 8–16)
AST SERPL-CCNC: 13 U/L (ref 10–40)
BASOPHILS # BLD AUTO: 0.06 K/UL (ref 0–0.2)
BASOPHILS NFR BLD: 0.6 % (ref 0–1.9)
BILIRUB SERPL-MCNC: 0.6 MG/DL (ref 0.1–1)
BUN SERPL-MCNC: 66 MG/DL (ref 6–20)
CALCIUM SERPL-MCNC: 9.6 MG/DL (ref 8.7–10.5)
CHLORIDE SERPL-SCNC: 99 MMOL/L (ref 95–110)
CO2 SERPL-SCNC: 26 MMOL/L (ref 23–29)
CREAT SERPL-MCNC: 7.9 MG/DL (ref 0.5–1.4)
DIFFERENTIAL METHOD: ABNORMAL
EOSINOPHIL # BLD AUTO: 0.4 K/UL (ref 0–0.5)
EOSINOPHIL NFR BLD: 3.9 % (ref 0–8)
ERYTHROCYTE [DISTWIDTH] IN BLOOD BY AUTOMATED COUNT: 15.2 % (ref 11.5–14.5)
EST. GFR  (NO RACE VARIABLE): 5.5 ML/MIN/1.73 M^2
GLUCOSE SERPL-MCNC: 150 MG/DL (ref 70–110)
GLUCOSE SERPL-MCNC: 163 MG/DL (ref 70–110)
GLUCOSE SERPL-MCNC: 180 MG/DL (ref 70–110)
GLUCOSE SERPL-MCNC: 258 MG/DL (ref 70–110)
GLUCOSE SERPL-MCNC: 274 MG/DL (ref 70–110)
HCT VFR BLD AUTO: 35.9 % (ref 37–48.5)
HGB BLD-MCNC: 11.2 G/DL (ref 12–16)
IMM GRANULOCYTES # BLD AUTO: 0.06 K/UL (ref 0–0.04)
IMM GRANULOCYTES NFR BLD AUTO: 0.6 % (ref 0–0.5)
LYMPHOCYTES # BLD AUTO: 3 K/UL (ref 1–4.8)
LYMPHOCYTES NFR BLD: 29.5 % (ref 18–48)
MAGNESIUM SERPL-MCNC: 2.1 MG/DL (ref 1.6–2.6)
MCH RBC QN AUTO: 26.9 PG (ref 27–31)
MCHC RBC AUTO-ENTMCNC: 31.2 G/DL (ref 32–36)
MCV RBC AUTO: 86 FL (ref 82–98)
MONOCYTES # BLD AUTO: 1 K/UL (ref 0.3–1)
MONOCYTES NFR BLD: 9.5 % (ref 4–15)
NEUTROPHILS # BLD AUTO: 5.7 K/UL (ref 1.8–7.7)
NEUTROPHILS NFR BLD: 55.9 % (ref 38–73)
NRBC BLD-RTO: 0 /100 WBC
PHOSPHATE SERPL-MCNC: 4.4 MG/DL (ref 2.7–4.5)
PLATELET # BLD AUTO: 317 K/UL (ref 150–450)
PMV BLD AUTO: 10.2 FL (ref 9.2–12.9)
POTASSIUM SERPL-SCNC: 4.4 MMOL/L (ref 3.5–5.1)
PROT SERPL-MCNC: 6.9 G/DL (ref 6–8.4)
RBC # BLD AUTO: 4.16 M/UL (ref 4–5.4)
SODIUM SERPL-SCNC: 135 MMOL/L (ref 136–145)
WBC # BLD AUTO: 10.23 K/UL (ref 3.9–12.7)

## 2022-08-22 PROCEDURE — 99233 PR SUBSEQUENT HOSPITAL CARE,LEVL III: ICD-10-PCS | Mod: ,,, | Performed by: PODIATRIST

## 2022-08-22 PROCEDURE — 63600175 PHARM REV CODE 636 W HCPCS: Performed by: NURSE PRACTITIONER

## 2022-08-22 PROCEDURE — 83735 ASSAY OF MAGNESIUM: CPT | Performed by: NURSE PRACTITIONER

## 2022-08-22 PROCEDURE — 99233 SBSQ HOSP IP/OBS HIGH 50: CPT | Mod: ,,, | Performed by: PODIATRIST

## 2022-08-22 PROCEDURE — 99231 SBSQ HOSP IP/OBS SF/LOW 25: CPT | Mod: ,,, | Performed by: INTERNAL MEDICINE

## 2022-08-22 PROCEDURE — 25000003 PHARM REV CODE 250: Performed by: NURSE PRACTITIONER

## 2022-08-22 PROCEDURE — 36415 COLL VENOUS BLD VENIPUNCTURE: CPT | Performed by: NURSE PRACTITIONER

## 2022-08-22 PROCEDURE — 84100 ASSAY OF PHOSPHORUS: CPT | Performed by: NURSE PRACTITIONER

## 2022-08-22 PROCEDURE — 25000003 PHARM REV CODE 250: Performed by: INTERNAL MEDICINE

## 2022-08-22 PROCEDURE — 99231 PR SUBSEQUENT HOSPITAL CARE,LEVL I: ICD-10-PCS | Mod: ,,, | Performed by: INTERNAL MEDICINE

## 2022-08-22 PROCEDURE — 90935 HEMODIALYSIS ONE EVALUATION: CPT

## 2022-08-22 PROCEDURE — 12000002 HC ACUTE/MED SURGE SEMI-PRIVATE ROOM

## 2022-08-22 PROCEDURE — 63600175 PHARM REV CODE 636 W HCPCS: Performed by: INTERNAL MEDICINE

## 2022-08-22 PROCEDURE — 85025 COMPLETE CBC W/AUTO DIFF WBC: CPT | Performed by: NURSE PRACTITIONER

## 2022-08-22 PROCEDURE — 80053 COMPREHEN METABOLIC PANEL: CPT | Performed by: NURSE PRACTITIONER

## 2022-08-22 RX ADMIN — METOCLOPRAMIDE 5 MG: 5 TABLET ORAL at 09:08

## 2022-08-22 RX ADMIN — INSULIN ASPART 3 UNITS: 100 INJECTION, SOLUTION INTRAVENOUS; SUBCUTANEOUS at 09:08

## 2022-08-22 RX ADMIN — CALCIUM ACETATE 667 MG: 667 CAPSULE ORAL at 08:08

## 2022-08-22 RX ADMIN — CALCIUM ACETATE 667 MG: 667 CAPSULE ORAL at 06:08

## 2022-08-22 RX ADMIN — HYDRALAZINE HYDROCHLORIDE 50 MG: 25 TABLET ORAL at 05:08

## 2022-08-22 RX ADMIN — HYDRALAZINE HYDROCHLORIDE 10 MG: 20 INJECTION INTRAMUSCULAR; INTRAVENOUS at 04:08

## 2022-08-22 RX ADMIN — AMLODIPINE BESYLATE 10 MG: 5 TABLET ORAL at 08:08

## 2022-08-22 RX ADMIN — HEPARIN SODIUM 5000 UNITS: 5000 INJECTION INTRAVENOUS; SUBCUTANEOUS at 09:08

## 2022-08-22 RX ADMIN — METOCLOPRAMIDE 5 MG: 5 TABLET ORAL at 05:08

## 2022-08-22 RX ADMIN — HYDRALAZINE HYDROCHLORIDE 50 MG: 25 TABLET ORAL at 02:08

## 2022-08-22 RX ADMIN — LOSARTAN POTASSIUM 100 MG: 50 TABLET, FILM COATED ORAL at 08:08

## 2022-08-22 RX ADMIN — HEPARIN SODIUM 5000 UNITS: 5000 INJECTION INTRAVENOUS; SUBCUTANEOUS at 05:08

## 2022-08-22 RX ADMIN — ATORVASTATIN CALCIUM 80 MG: 40 TABLET, FILM COATED ORAL at 08:08

## 2022-08-22 RX ADMIN — METOCLOPRAMIDE 5 MG: 5 TABLET ORAL at 06:08

## 2022-08-22 RX ADMIN — CALCIUM ACETATE 667 MG: 667 CAPSULE ORAL at 12:08

## 2022-08-22 RX ADMIN — CLOPIDOGREL BISULFATE 75 MG: 75 TABLET, FILM COATED ORAL at 08:08

## 2022-08-22 RX ADMIN — ASPIRIN 81 MG: 81 TABLET, COATED ORAL at 08:08

## 2022-08-22 RX ADMIN — HEPARIN SODIUM 5000 UNITS: 5000 INJECTION INTRAVENOUS; SUBCUTANEOUS at 02:08

## 2022-08-22 RX ADMIN — HYDRALAZINE HYDROCHLORIDE 50 MG: 25 TABLET ORAL at 09:08

## 2022-08-22 NOTE — PROGRESS NOTES
Patient is asleep but enter the room I reviewed the pictures comments from Dr. Bailon and I reviewed the x-rays.  X-rays show questionable changes at the interphalangeal joint 1st toe patient may be secondary to the hammertoe deformity or may be early osteomyelitis.  I am going to order an MRI of the left forefoot attention to the 1st toe for osteomyelitis.  If positive for osteomyelitis discussion be held with Infectious Disease as to whether she needs a bone biopsy for IV antibiotics or amputation of the toe.  I have not seen this patient and will over 9 months she has been following with my associate Dr. Bruno.  Dr. Bruno is out of town for

## 2022-08-22 NOTE — PROGRESS NOTES
ID Consult    Reason: left great toe wound    INTERVAL HISTORY:  8.22: Toe xray was worrisome for osteomyelitis and MRI confirmed it. Dr. Ponce willing to do a bone biopsy.         EXAM & DIAGNOSTICS REVIEWED:   Vitals:     Temp:  [98 °F (36.7 °C)-98.3 °F (36.8 °C)]   Temp: 98.1 °F (36.7 °C) (08/22/22 1244)  Pulse: 81 (08/22/22 1244)  Resp: 18 (08/22/22 1244)  BP: (!) 109/58 (08/22/22 1244)  SpO2: (!) 94 % (08/22/22 1244)  No intake or output data in the 24 hours ending 08/22/22 1301         8/20 7/25 8/21  As best I can recall, her distal great toe has been erythematous but it is more so now  8/22: wound vac not disturbed. Tearful about left arm graft clotting     General Labs reviewed:  Recent Labs   Lab 08/20/22  0306 08/21/22  0331 08/22/22  0455   WBC 9.30 10.26 10.23   HGB 9.8* 9.6* 11.2*   HCT 31.9* 31.1* 35.9*    303 317       Recent Labs   Lab 08/20/22  0307 08/21/22  0331 08/22/22  0455    136 135*   K 3.7 4.2 4.4   CL 99 98 99   CO2 31* 27 26   BUN 32* 49* 66*   CREATININE 4.9* 6.8* 7.9*   CALCIUM 9.3 9.2 9.6   PROT 6.9 6.5 6.9   BILITOT 0.5 0.6 0.6   ALKPHOS 64 57 58   ALT 20 16 17   AST 16 13 13     No results for input(s): CRP in the last 168 hours.      Micro:  Microbiology Results (last 7 days)     ** No results found for the last 168 hours. **          Imaging Reviewed:    foot xray:  Lucency at the level of the proximal aspect of the distal great toe that is similar in character as compared to previous attributed towards the flexion position of extremity digit though less conspicuous for osteomyelitis. Would advise repeat assessment with MRI of the forefoot region with contrast for optimal evaluation.    MRI of left forefoot  Acute osteomyelitis involving great toe proximal and distal phalanges.         ASSESSMENT:  Left diabetic foot ulcer, not obviously infected  PAD with poor healing  ESRD  DM with multiple complications    PLAN:  Stop vanc and await bone biopsy

## 2022-08-22 NOTE — PROGRESS NOTES
Identifying data:  57-year-old female    Chief complaint:  Ulceration left 1st toe    History of present illness:  Patient being treated by [facility]. Bruon [facility].  Apparently the patient noticed a change in her foot and decided the enter the emergency department at [facility].  She has been admitted for evaluation treatment of the toe ulcer.  She is being followed by infectious disease.  I ordered an MRI which show she has acute osteomyelitis of the toe.  And I am here today discussing with her treatment options.    Objective:  Grade 2 ulcer granulation tissue no fluctuance minimal redness dorsal left 1st toe.  The ulcer does probe to bone.  She has hammertoe deformity 1st toe.    Assessment:  Acute osteomyelitis left 1st toe with grade 2 ulcer., diabetes mellitus with peripheral neuropathy.    Plan:  Discussed with patient this phone were options are as follows 1.  Bone biopsy IV antibiotics for 6 weeks  2. Amputation 1st toe left foot.  Patient would like to try bone biopsy IV antibiotics.  Will either have to do bone biopsy at bedside tomorrow if I have time right now it appears that schedule may require me to do this Wednesday in the operating room under sedation anesthesia monitor the patient.  She should require general anesthesia I explained to him even IV antibiotics and guarantee this will heal that she may not still of mention lose her toe.

## 2022-08-22 NOTE — PROGRESS NOTES
"INPATIENT NEPHROLOGY Progress Note   [facility]    Patient Name: Leanna García  Date: 08/22/2022    Reason for consultation: ESRD    Chief Complaint:   Chief Complaint   Patient presents with    Wound Infection     Diabetic pt with a right foot wound that looks to be stage II with mild redness. Pt is concerned and wanted to get it looked at "before it got too bad". Pt currently is being seen by woundcare for her other foot, presented with a wound vac.      History of Present Illness:  Leanna García is a 57 y.o. female with a history as  has a past medical history of A-fib, Anemia due to end stage renal disease (6/30/2022), Arthritis, Bronchitis, Cardiovascular event risk, ASCVD 10-year risk 6.7% (10/15/2016), Diabetes mellitus type II, Diabetic foot infection, Diabetic ulcer of left midfoot (05/05/2022), Disorder of kidney and ureter, Encounter for blood transfusion, ESRD (end stage renal disease) (05/18/2018), AMNJINDER (generalized anxiety disorder) (10/25/2016), History of colon polyps (11/02/2016), Hyperlipidemia, Hypertension, and Stroke. who presented to the ED with a Wound Infection (Diabetic pt with a right foot wound that looks to be stage II with mild redness. Pt is concerned and wanted to get it looked at "before it got too bad". Pt currently is being seen by woundcare for her other foot, presented with a wound vac. )     Patient presented to the emergency for evaluation of a right foot wound check. She reports reports while she was waiting she ate a salad. Further states once she got to the ED room, she was given IV ABX and was about to be discharged when she became nauseated and started vomiting. Per the ED, she was given IV zosyn with plans to discharge.  She was then given antiemetics for the NV, but was uncontrolled. Emesis looked like undigested food, so she was then treated with Reglan. ED provider also reports patient then complained of chest pain and abdominal pain.  " "Patient reports generalized CP 20/10 PRS as describes as "feel like i'm being beating up in chest." She further states the chest pain is going all over into my stomach. While at bedside, patient started to force herself to gagged and vomit. Will admit for symptom management with antiemetics. Consult Nephrology for ESRD. Consult Dr. Bruno for diabetic foot wound.        Lab and imaging obtained and reviewed.   CBC shows hemoglobin 11.5 MCH 26.5 MCHC 30.6 RDW 15.4 lymph% 17.7.  CMP shows sodium 135 BUN 54 creatinine 6.2 GFR 7.4 glucose 233 total protein 8.6.  Chest x-ray without acute cardiopulmonary findings. XR right foot shows Degenerative changes of the foot with no acute osseous abnormality observed.     CT abdomen pelvis  IMPRESSION:   1. Incidental parapelvic gallstones.  2. Stable perinephric stranding.  3. Questionable destructive changes of the L5-S1 disc space. If an occult infection is soft tissue consider MR imaging pre and postcontrast to assess this further. This is not changed however significantly since 5/4/2022     Interval History:  8/19- thinks she has food poisoning, Dr. Bruno looking at foot wounds  8/20- wound vac replacement today, discharge per hospitalist and podiatry  8/21- had debridement and wound vac placement yest- discharge per hospitalist and podiatry  8/22 VSS. HD today. Multiple nurses unable to needle he AVF today, clots coming out, good thrill and bruit. Was working OK on Friday. Will get US AVF and attempt again in AM. DO not discharge her.    Plan of Care:    ESRD on HD MWF  HTN  Hyponatremia  Acidosis  Secondary HPT  Anemia of CKD    Plan:    - continue HD MWF  - continue home BP meds- adjust UF goal as needed  - renal diet, 1.5L fluid restriction  - adjust dialysate  - continue binder with meals  - continue FERMÍN with HD    Thank you for allowing us to participate in this patient's care. We will continue to follow.    Vital Signs:  Temp Readings from Last 3 Encounters: "   08/22/22 98.1 °F (36.7 °C) (Oral)   08/12/22 98 °F (36.7 °C)   07/29/22 96.7 °F (35.9 °C) (Axillary)       Pulse Readings from Last 3 Encounters:   08/22/22 73   08/12/22 66   07/29/22 75       BP Readings from Last 3 Encounters:   08/22/22 (!) 145/61   08/12/22 (!) 182/79   07/29/22 132/89       Weight:  Wt Readings from Last 3 Encounters:   08/21/22 85.7 kg (188 lb 15 oz)   08/07/22 87.3 kg (192 lb 7.4 oz)   07/24/22 78.9 kg (173 lb 14.4 oz)     Medications:  Scheduled Meds:   amLODIPine  10 mg Oral Daily    aspirin  81 mg Oral Daily    atorvastatin  80 mg Oral Daily    calcium acetate(phosphat bind)  667 mg Oral TID WM    clopidogreL  75 mg Oral Daily    epoetin chris-epbx  4,400 Units Subcutaneous Every Mon, Wed, Fri    heparin (porcine)  5,000 Units Subcutaneous Q8H    hydrALAZINE  50 mg Oral Q8H    insulin detemir U-100  10 Units Subcutaneous BID    losartan  100 mg Oral Daily    metoclopramide HCl  5 mg Oral QID (AC & HS)     Continuous Infusions:  PRN Meds:.dextrose 10%, dextrose 10%, glucagon (human recombinant), glucose, glucose, hydrALAZINE, insulin aspart U-100, melatonin, naloxone, ondansetron, oxyCODONE, senna-docusate 8.6-50 mg, sodium chloride 0.9%, sodium chloride 0.9%, Pharmacy to dose Vancomycin consult **AND** vancomycin - pharmacy to dose  No current facility-administered medications on file prior to encounter.     Current Outpatient Medications on File Prior to Encounter   Medication Sig Dispense Refill    amLODIPine (NORVASC) 10 MG tablet Take 1 tablet (10 mg total) by mouth once daily.      aspirin (ECOTRIN) 81 MG EC tablet Take 81 mg by mouth once daily.      atorvastatin (LIPITOR) 80 MG tablet Take 1 tablet (80 mg total) by mouth once daily. 90 tablet 3    azelastine (ASTELIN) 137 mcg (0.1 %) nasal spray 1 spray (137 mcg total) by Nasal route 2 (two) times a day. 30 mL 0    calcium acetate,phosphat bind, (PHOSLO) 667 mg capsule Take 1 capsule (667 mg total) by mouth 3  (three) times daily with meals.      cetirizine (ZYRTEC) 10 MG tablet Take 1 tablet (10 mg total) by mouth every other day.      clopidogreL (PLAVIX) 75 mg tablet Take 1 tablet (75 mg total) by mouth once daily. 30 tablet 0    epoetin chris-epbx (RETACRIT) 4,000 unit/mL injection Inject 1.11 mLs (4,440 Units total) into the skin every Mon, Wed, Fri.      fluticasone propionate (FLONASE) 50 mcg/actuation nasal spray 2 sprays (100 mcg total) by Each Nostril route once daily.      gabapentin (NEURONTIN) 100 MG capsule Take 1 capsule (100 mg total) by mouth 2 (two) times daily.      hydrALAZINE (APRESOLINE) 50 MG tablet Take 1 tablet (50 mg total) by mouth every 8 (eight) hours. 90 tablet 0    insulin aspart U-100 (NOVOLOG FLEXPEN U-100 INSULIN) 100 unit/mL (3 mL) InPn pen Inject 5-8 units 3 times per day with food. (Patient taking differently: Inject 5-8 Units into the skin 3 (three) times daily with meals. Inject 5-8 units 3 times per day with food.) 1 Box 6    insulin detemir U-100 (LEVEMIR FLEXTOUCH U-100 INSULN) 100 unit/mL (3 mL) InPn pen Inject 15 Units into the skin every evening.      lactulose (CHRONULAC) 10 gram/15 mL solution Take 20 g by mouth 2 (two) times a day.      losartan (COZAAR) 100 MG tablet Take 1 tablet (100 mg total) by mouth once daily.      ondansetron (ZOFRAN-ODT) 8 MG TbDL Take 1 tablet (8 mg total) by mouth every 8 (eight) hours as needed (nausea).      oxyCODONE (ROXICODONE) 5 MG immediate release tablet Take 1 tablet (5 mg total) by mouth every 6 (six) hours as needed for Pain.      polyethylene glycol (GLYCOLAX) 17 gram PwPk Take 17 g by mouth 2 (two) times daily as needed.      senna-docusate 8.6-50 mg (PERICOLACE) 8.6-50 mg per tablet Take 1 tablet by mouth 2 (two) times daily.      blood sugar diagnostic Strp To check BG 4 times daily, to use with insurance preferred meter (Patient taking differently: 1 strip by Misc.(Non-Drug; Combo Route) route 4 (four) times daily. To  "check BG 4 times daily, to use with insurance preferred meter) 450 strip 3    pen needle, diabetic (BD ULTRA-FINE ROBERT PEN NEEDLE) 32 gauge x 5/32" Ndle To use 4 times per day with insulin injections. (Patient taking differently: 1 pen by Misc.(Non-Drug; Combo Route) route 4 (four) times daily. To use 4 times per day with insulin injections.) 450 each 2    [DISCONTINUED] blood-glucose meter (ACCU-CHEK KAYLIE PLUS METER) Misc To use to check blood sugars 4 times a day 1 each 0    [DISCONTINUED] clorazepate (TRANXENE) 3.75 MG Tab Take 7.5 mg by mouth nightly as needed.       [DISCONTINUED] dextrose (GLUCOSE GEL) 40 % gel Take 37.5 mLs (15,000 mg total) by mouth once as needed (hypoglycemia). 37.5 g 4    [DISCONTINUED] ezetimibe (ZETIA) 10 mg tablet Take 1 tablet (10 mg total) by mouth once daily.      [DISCONTINUED] fenofibrate 160 MG Tab Take 1 tablet (160 mg total) by mouth once daily. 90 tablet 3    [DISCONTINUED] lancing device with lancets (ACCU-CHEK SOFT DEV LANCETS) Kit To check blood sugars 4 times per day 400 each 3    [DISCONTINUED] pantoprazole (PROTONIX) 40 MG tablet Take 1 tablet (40 mg total) by mouth once daily.         Review of Systems:  Neg    Physical Exam:  Constitutional: nad, aao x 3  Heart: rrr, no m/r/g, no edema  Lungs: ctab, no w/r/r/c, no lb  Abdomen: s/nt/nd, +BS    Results:  Recent Labs   Lab 08/20/22 0307 08/21/22  0331 08/22/22  0455    136 135*   K 3.7 4.2 4.4   CL 99 98 99   CO2 31* 27 26   BUN 32* 49* 66*   CREATININE 4.9* 6.8* 7.9*   * 186* 150*       Recent Labs   Lab 08/20/22 0307 08/21/22  0331 08/22/22  0455   CALCIUM 9.3 9.2 9.6   ALBUMIN 3.1* 3.0* 3.1*   PHOS 3.6 4.1 4.4   MG 2.0 2.0 2.1       Recent Labs   Lab 01/30/20  0705 03/10/22  0650   PTH, Intact 466.0 H 387.0 H       No results for input(s): POCTGLUCOSE in the last 168 hours.    Recent Labs   Lab 05/05/22  0320 05/29/22  1324 06/22/22  0618   Hemoglobin A1C 10.6 H 8.3 H 7.5 H       Recent Labs "   Lab 08/20/22  0306 08/21/22  0331 08/22/22  0455   WBC 9.30 10.26 10.23   HGB 9.8* 9.6* 11.2*   HCT 31.9* 31.1* 35.9*    303 317   MCV 87 87 86   MCHC 30.7* 30.9* 31.2*   MONO 9.6  0.9 10.0  1.0 9.5  1.0       Recent Labs   Lab 08/20/22  0307 08/21/22  0331 08/22/22  0455   BILITOT 0.5 0.6 0.6   PROT 6.9 6.5 6.9   ALBUMIN 3.1* 3.0* 3.1*   ALKPHOS 64 57 58   ALT 20 16 17   AST 16 13 13       Recent Labs   Lab 05/29/22  0900 06/21/22  1612   Color, UA Yellow Yellow   Appearance, UA Clear Clear   pH, UA 8.0 >8.0 A   Specific Christoval, UA 1.015 1.010   Protein, UA 2+ A 3+ A   Glucose, UA 2+ A 2+ A   Ketones, UA Negative Negative   Urobilinogen, UA Negative Negative   Bilirubin (UA) Negative Negative   Occult Blood UA Negative Negative   Nitrite, UA Negative Negative   RBC, UA 1 1   WBC, UA 5 2   Bacteria Rare Negative   Hyaline Casts, UA 0 12 A       Recent Labs   Lab 05/19/21  0300   POC PH 7.273 L   POC PCO2 47.8 H   POC HCO3 22.1 L   POC PO2 22 LL   POC SATURATED O2 30 L   POC BE -5   Sample VENOUS     I have spent > minutes providing care for this patient for the above diagnoses. These services have included chart/data/imaging review, evaluation, exam, formulation of plan, , note preparation, and discussions with staff involved in this patient's care.    Bryce Craig MD  [facility]  [address] [place] [address]  [phone] (p)  [phone] (f)

## 2022-08-22 NOTE — PROGRESS NOTES
[facility] Medicine  Progress Note    Patient name: Leanna García  MRN: [id number]  Admit Date: 8/18/2022   LOS: 2 days     SUBJECTIVE:     Principal problem: Diabetic ulcer of right midfoot associated with type 2 diabetes mellitus    Interval History:  No acute overnight events reported.  Seen in the dialysis unit.  She was unable to undergo dialysis secondary to difficulty in obtaining access.  Also seen by Podiatry and MRI of the left forefoot confirmed acute osteomyelitis involving the great toe.      Scheduled Meds:   amLODIPine  10 mg Oral Daily    aspirin  81 mg Oral Daily    atorvastatin  80 mg Oral Daily    calcium acetate(phosphat bind)  667 mg Oral TID WM    clopidogreL  75 mg Oral Daily    epoetin chris-epbx  4,400 Units Subcutaneous Every Mon, Wed, Fri    heparin (porcine)  5,000 Units Subcutaneous Q8H    hydrALAZINE  50 mg Oral Q8H    insulin detemir U-100  10 Units Subcutaneous BID    losartan  100 mg Oral Daily    metoclopramide HCl  5 mg Oral QID (AC & HS)     Continuous Infusions:  PRN Meds:dextrose 10%, dextrose 10%, glucagon (human recombinant), glucose, glucose, hydrALAZINE, insulin aspart U-100, melatonin, naloxone, ondansetron, oxyCODONE, senna-docusate 8.6-50 mg, sodium chloride 0.9%, sodium chloride 0.9%    Review of patient's allergies indicates:   Allergen Reactions    Dilaudid [hydromorphone] Nausea And Vomiting    Vancomycin Itching    Chlorhexidine Itching    Lortab [hydrocodone-acetaminophen] Itching       Review of Systems: As per interval history    OBJECTIVE:     Vital Signs (Most Recent)  Temp: 98.1 °F (36.7 °C) (08/22/22 1244)  Pulse: 81 (08/22/22 1244)  Resp: 18 (08/22/22 1244)  BP: (!) 109/58 (08/22/22 1244)  SpO2: (!) 94 % (08/22/22 1244)    Vital Signs Range (Last 24H):  Temp:  [98.1 °F (36.7 °C)-98.3 °F (36.8 °C)]   Pulse:  [69-85]   Resp:  [17-18]   BP: (109-189)/(58-89)   SpO2:  [94 %-97 %]     I & O (Last 24H):  No intake or output  data in the 24 hours ending 08/22/22 1601    Physical Exam:  General: Patient resting comfortably in no acute distress. Appears as stated age. Calm  Eyes: No conjunctival injection. No scleral icterus.  ENT: Hearing grossly intact. No discharge from ears. No nasal discharge.   CVS: RRR. No LE edema BL  Lungs:  No tachypnea or accessory muscle use.  Clear to auscultation bilaterally  Abdomen:  Soft, nontender and nondistended.  No organomegaly  Neuro:  Alert.  Follows commands.  Skin:  Left foot dressing with wound VAC in place.  Clean, dry and intact.    Laboratory:  I have reviewed all pertinent lab results within the past 24 hours.  CBC:   Recent Labs   Lab 08/22/22  0455   WBC 10.23   RBC 4.16   HGB 11.2*   HCT 35.9*      MCV 86   MCH 26.9*   MCHC 31.2*     BMP:   Recent Labs   Lab 08/22/22 0455   *   *   K 4.4   CL 99   CO2 26   BUN 66*   CREATININE 7.9*   CALCIUM 9.6   MG 2.1       Diagnostic Results:  Labs: Reviewed  US: Reviewed  MRI: Reviewed     US Hemodialysis Access [[id number]] Collected: 08/22/22 1308   Order Status: Completed Updated: 08/22/22 8063   Narrative:     Ultrasound left upper extremity hemodialysis access     Clinical history is left upper extremity AV fistula.     Color and spectral Doppler evaluation of the left upper extremity was performed.     There is nonocclusive thrombus within the AV fistula in the left upper arm. Velocities are as follows   Left upper arm AV fistula at anastomosis proximally 81 cm/s   Left upper extremity mid AV fistula 21 cm/s   Left upper extremity distal AV fistula 14 cm/s   AV fistula at the anastomosis above the elbow 46 cm/s     IMPRESSION: Moderate amount of thrombus within the AV fistula with minimal flow noted.     Electronically signed by:  Denise Estrada MD  8/22/2022 2:31 PM CDT Workstation: [id number]   MRI Foot (Forefoot) Left Without Contrast [[id number]] Collected: 08/22/22 0719   Order Status: Completed Updated: 08/22/22 7305    Narrative:     MR FOOT WITHOUT IV CONTRAST     CLINICAL HISTORY:   57 years Female Foot swelling, diabetic, osteomyelitis suspected, xray done Attn distal great toe left Foot swelling, diabetic, osteomyelitis suspected, xray done     COMPARISON: Left foot radiography August 21, 2022     FINDINGS: Diffuse bone marrow edema and loss of normal T1 hyperintense signal throughout the great toe distal phalanx. There is also a large area of marrow edema and decreased T1 marrow signal primarily involving the mid to distal aspect of the great toe proximal phalanx. Aforementioned findings are consistent with osteomyelitis of the great toe phalanges. Normal marrow signal intensity involving the great toe metatarsal. Physiologic great toe MTP joint fluid. Normal bone marrow signal intensity involving the second through fifth metatarsals and phalanges.     There is soft tissue swelling and skin irregularity about the great toe in this diabetic patient. No well-defined abscess/drainable fluid collection is demonstrated on this noncontrast exam. The visualized flexor tendons and extensor tendons are intact. The Lisfranc ligament is intact. Mild degenerative changes of the midfoot, partially visualized.     IMPRESSION:     Acute osteomyelitis involving great toe proximal and distal phalanges.     Electronically signed by:  Jonathan Prabhakar MD  8/22/2022 9:01 AM CDT Workstation: [id number]         ASSESSMENT/PLAN:     Active Hospital Problems    Diagnosis  POA    *Diabetic ulcer of right midfoot associated with type 2 diabetes mellitus [E11.621, L97.419]  Yes    Gastroparesis [K31.84]  Yes    Slow transit constipation [K59.01]  Yes    Ulcer of left foot with necrosis of muscle [L97.523]  Yes    PAD (peripheral artery disease) [I73.9]  Yes    Type 2 diabetes mellitus with kidney complication, with long-term current use of insulin [E11.29, Z79.4]  Not Applicable    ESRD (end stage renal disease) [N18.6]  Yes    Hyperlipemia  [E78.5]  Yes    Hypertension associated with diabetes [E11.59, I15.2]  Yes     Chronic      Resolved Hospital Problems    Diagnosis Date Resolved POA    Chest pain [R07.9] 08/19/2022 Yes         Plan:   Diabetic left foot ulcer with muscle necrosis - recently discharged from LTAC about 1 week prior to this hospitalization  Discussed with infectious disease provider who is aware of the patient.  Patient received a total of 10 weeks of antibiotics in the last 3 months  Status post bedside excisional debridement of left midfoot on 8/19  MRI of the left forefoot revealed acute osteomyelitis involving great toe proximal and distal phalanges.  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  Basal insulin along with low-dose insulin sliding scale.  Increase detemir to 10 units b.i.d.    Discuss care with Nephrology and  at bedside    VTE Risk Mitigation (From admission, onward)         Ordered     heparin (porcine) injection 5,000 Units  Every 8 hours         08/18/22 2311     IP VTE HIGH RISK PATIENT  Once         08/18/22 2311     Place sequential compression device  Until discontinued         08/18/22 2311                    Department Hospital Medicine  [facility]  Andrea Montoya MD  Date of service: 08/22/2022

## 2022-08-23 PROBLEM — M86.072 ACUTE HEMATOGENOUS OSTEOMYELITIS OF LEFT FOOT: Status: ACTIVE | Noted: 2022-08-23

## 2022-08-23 LAB
ALBUMIN SERPL BCP-MCNC: 3.1 G/DL (ref 3.5–5.2)
ALP SERPL-CCNC: 62 U/L (ref 55–135)
ALT SERPL W/O P-5'-P-CCNC: 15 U/L (ref 10–44)
ANION GAP SERPL CALC-SCNC: 12 MMOL/L (ref 8–16)
AST SERPL-CCNC: 11 U/L (ref 10–40)
BASOPHILS # BLD AUTO: 0.05 K/UL (ref 0–0.2)
BASOPHILS NFR BLD: 0.5 % (ref 0–1.9)
BILIRUB SERPL-MCNC: 0.6 MG/DL (ref 0.1–1)
BUN SERPL-MCNC: 88 MG/DL (ref 6–20)
CALCIUM SERPL-MCNC: 9.9 MG/DL (ref 8.7–10.5)
CHLORIDE SERPL-SCNC: 98 MMOL/L (ref 95–110)
CO2 SERPL-SCNC: 27 MMOL/L (ref 23–29)
CREAT SERPL-MCNC: 9.4 MG/DL (ref 0.5–1.4)
DIFFERENTIAL METHOD: ABNORMAL
EOSINOPHIL # BLD AUTO: 0.3 K/UL (ref 0–0.5)
EOSINOPHIL NFR BLD: 3 % (ref 0–8)
ERYTHROCYTE [DISTWIDTH] IN BLOOD BY AUTOMATED COUNT: 15.4 % (ref 11.5–14.5)
EST. GFR  (NO RACE VARIABLE): 4.5 ML/MIN/1.73 M^2
GLUCOSE SERPL-MCNC: 155 MG/DL (ref 70–110)
GLUCOSE SERPL-MCNC: 164 MG/DL (ref 70–110)
GLUCOSE SERPL-MCNC: 170 MG/DL (ref 70–110)
GLUCOSE SERPL-MCNC: 205 MG/DL (ref 70–110)
GLUCOSE SERPL-MCNC: 208 MG/DL (ref 70–110)
GLUCOSE SERPL-MCNC: 218 MG/DL (ref 70–110)
HCT VFR BLD AUTO: 31.9 % (ref 37–48.5)
HGB BLD-MCNC: 9.9 G/DL (ref 12–16)
IMM GRANULOCYTES # BLD AUTO: 0.08 K/UL (ref 0–0.04)
IMM GRANULOCYTES NFR BLD AUTO: 0.8 % (ref 0–0.5)
LYMPHOCYTES # BLD AUTO: 2.4 K/UL (ref 1–4.8)
LYMPHOCYTES NFR BLD: 23.9 % (ref 18–48)
MAGNESIUM SERPL-MCNC: 2.2 MG/DL (ref 1.6–2.6)
MCH RBC QN AUTO: 26.5 PG (ref 27–31)
MCHC RBC AUTO-ENTMCNC: 31 G/DL (ref 32–36)
MCV RBC AUTO: 85 FL (ref 82–98)
MONOCYTES # BLD AUTO: 0.9 K/UL (ref 0.3–1)
MONOCYTES NFR BLD: 8.8 % (ref 4–15)
NEUTROPHILS # BLD AUTO: 6.5 K/UL (ref 1.8–7.7)
NEUTROPHILS NFR BLD: 63 % (ref 38–73)
NRBC BLD-RTO: 0 /100 WBC
PHOSPHATE SERPL-MCNC: 4.9 MG/DL (ref 2.7–4.5)
PLATELET # BLD AUTO: 301 K/UL (ref 150–450)
PMV BLD AUTO: 9.8 FL (ref 9.2–12.9)
POTASSIUM SERPL-SCNC: 5 MMOL/L (ref 3.5–5.1)
PROT SERPL-MCNC: 6.9 G/DL (ref 6–8.4)
RBC # BLD AUTO: 3.74 M/UL (ref 4–5.4)
SODIUM SERPL-SCNC: 137 MMOL/L (ref 136–145)
WBC # BLD AUTO: 10.23 K/UL (ref 3.9–12.7)

## 2022-08-23 PROCEDURE — 99231 PR SUBSEQUENT HOSPITAL CARE,LEVL I: ICD-10-PCS | Mod: ,,, | Performed by: INTERNAL MEDICINE

## 2022-08-23 PROCEDURE — 63600175 PHARM REV CODE 636 W HCPCS: Performed by: NURSE PRACTITIONER

## 2022-08-23 PROCEDURE — 85025 COMPLETE CBC W/AUTO DIFF WBC: CPT | Performed by: NURSE PRACTITIONER

## 2022-08-23 PROCEDURE — 83735 ASSAY OF MAGNESIUM: CPT | Performed by: NURSE PRACTITIONER

## 2022-08-23 PROCEDURE — 12000002 HC ACUTE/MED SURGE SEMI-PRIVATE ROOM

## 2022-08-23 PROCEDURE — 84100 ASSAY OF PHOSPHORUS: CPT | Performed by: NURSE PRACTITIONER

## 2022-08-23 PROCEDURE — 36415 COLL VENOUS BLD VENIPUNCTURE: CPT | Performed by: NURSE PRACTITIONER

## 2022-08-23 PROCEDURE — 82962 GLUCOSE BLOOD TEST: CPT

## 2022-08-23 PROCEDURE — 99231 SBSQ HOSP IP/OBS SF/LOW 25: CPT | Mod: ,,, | Performed by: INTERNAL MEDICINE

## 2022-08-23 PROCEDURE — 25000003 PHARM REV CODE 250: Performed by: INTERNAL MEDICINE

## 2022-08-23 PROCEDURE — 25000003 PHARM REV CODE 250: Performed by: NURSE PRACTITIONER

## 2022-08-23 PROCEDURE — 90935 HEMODIALYSIS ONE EVALUATION: CPT

## 2022-08-23 PROCEDURE — 63600175 PHARM REV CODE 636 W HCPCS: Performed by: INTERNAL MEDICINE

## 2022-08-23 PROCEDURE — 80053 COMPREHEN METABOLIC PANEL: CPT | Performed by: NURSE PRACTITIONER

## 2022-08-23 RX ADMIN — HYDRALAZINE HYDROCHLORIDE 50 MG: 25 TABLET ORAL at 09:08

## 2022-08-23 RX ADMIN — METOCLOPRAMIDE 5 MG: 5 TABLET ORAL at 06:08

## 2022-08-23 RX ADMIN — METOCLOPRAMIDE 5 MG: 5 TABLET ORAL at 04:08

## 2022-08-23 RX ADMIN — HEPARIN SODIUM 5000 UNITS: 5000 INJECTION INTRAVENOUS; SUBCUTANEOUS at 06:08

## 2022-08-23 RX ADMIN — SODIUM ZIRCONIUM CYCLOSILICATE 10 G: 5 POWDER, FOR SUSPENSION ORAL at 09:08

## 2022-08-23 RX ADMIN — HEPARIN SODIUM 5000 UNITS: 5000 INJECTION INTRAVENOUS; SUBCUTANEOUS at 09:08

## 2022-08-23 RX ADMIN — HEPARIN SODIUM 5000 UNITS: 5000 INJECTION INTRAVENOUS; SUBCUTANEOUS at 03:08

## 2022-08-23 RX ADMIN — SODIUM ZIRCONIUM CYCLOSILICATE 10 G: 5 POWDER, FOR SUSPENSION ORAL at 03:08

## 2022-08-23 RX ADMIN — INSULIN ASPART 2 UNITS: 100 INJECTION, SOLUTION INTRAVENOUS; SUBCUTANEOUS at 09:08

## 2022-08-23 RX ADMIN — HYDRALAZINE HYDROCHLORIDE 10 MG: 20 INJECTION INTRAMUSCULAR; INTRAVENOUS at 09:08

## 2022-08-23 RX ADMIN — CALCIUM ACETATE 667 MG: 667 CAPSULE ORAL at 06:08

## 2022-08-23 RX ADMIN — HYDRALAZINE HYDROCHLORIDE 50 MG: 25 TABLET ORAL at 06:08

## 2022-08-23 RX ADMIN — METOCLOPRAMIDE 5 MG: 5 TABLET ORAL at 09:08

## 2022-08-23 RX ADMIN — HYDRALAZINE HYDROCHLORIDE 50 MG: 25 TABLET ORAL at 03:08

## 2022-08-23 NOTE — PLAN OF CARE
Problem: Oral Intake Inadequate  Goal: Improved Oral Intake  Outcome: Ongoing, Progressing  Intervention: Promote and Optimize Oral Intake  Flowsheets (Taken 8/23/2022 1334)  Oral Nutrition Promotion: calorie-dense liquids provided

## 2022-08-23 NOTE — PROGRESS NOTES
"INPATIENT NEPHROLOGY Progress Note   [facility]    Patient Name: Leanna García  Date: 08/23/2022    Reason for consultation: ESRD    Chief Complaint:   Chief Complaint   Patient presents with    Wound Infection     Diabetic pt with a right foot wound that looks to be stage II with mild redness. Pt is concerned and wanted to get it looked at "before it got too bad". Pt currently is being seen by woundcare for her other foot, presented with a wound vac.      History of Present Illness:  Leanna García is a 57 y.o. female with a history as  has a past medical history of A-fib, Anemia due to end stage renal disease (6/30/2022), Arthritis, Bronchitis, Cardiovascular event risk, ASCVD 10-year risk 6.7% (10/15/2016), Diabetes mellitus type II, Diabetic foot infection, Diabetic ulcer of left midfoot (05/05/2022), Disorder of kidney and ureter, Encounter for blood transfusion, ESRD (end stage renal disease) (05/18/2018), MANJINDER (generalized anxiety disorder) (10/25/2016), History of colon polyps (11/02/2016), Hyperlipidemia, Hypertension, and Stroke. who presented to the ED with a Wound Infection (Diabetic pt with a right foot wound that looks to be stage II with mild redness. Pt is concerned and wanted to get it looked at "before it got too bad". Pt currently is being seen by woundcare for her other foot, presented with a wound vac. )     Patient presented to the emergency for evaluation of a right foot wound check. She reports reports while she was waiting she ate a salad. Further states once she got to the ED room, she was given IV ABX and was about to be discharged when she became nauseated and started vomiting. Per the ED, she was given IV zosyn with plans to discharge.  She was then given antiemetics for the NV, but was uncontrolled. Emesis looked like undigested food, so she was then treated with Reglan. ED provider also reports patient then complained of chest pain and abdominal pain.  " "Patient reports generalized CP 20/10 PRS as describes as "feel like i'm being beating up in chest." She further states the chest pain is going all over into my stomach. While at bedside, patient started to force herself to gagged and vomit. Will admit for symptom management with antiemetics. Consult Nephrology for ESRD. Consult Dr. Bruno for diabetic foot wound.        Lab and imaging obtained and reviewed.   CBC shows hemoglobin 11.5 MCH 26.5 MCHC 30.6 RDW 15.4 lymph% 17.7.  CMP shows sodium 135 BUN 54 creatinine 6.2 GFR 7.4 glucose 233 total protein 8.6.  Chest x-ray without acute cardiopulmonary findings. XR right foot shows Degenerative changes of the foot with no acute osseous abnormality observed.     CT abdomen pelvis  IMPRESSION:   1. Incidental parapelvic gallstones.  2. Stable perinephric stranding.  3. Questionable destructive changes of the L5-S1 disc space. If an occult infection is soft tissue consider MR imaging pre and postcontrast to assess this further. This is not changed however significantly since 5/4/2022     Interval History:  8/19- thinks she has food poisoning, Dr. Bruno looking at foot wounds  8/20- wound vac replacement today, discharge per hospitalist and podiatry  8/21- had debridement and wound vac placement yest- discharge per hospitalist and podiatry  8/22 VSS. HD today. Multiple nurses unable to needle he AVF today, clots coming out, good thrill and bruit. Was working OK on Friday. Will get US AVF and attempt again in AM. DO not discharge her.  8/23 VSS. AVF US shows nonocclusive clot - we will attempt to needle and run her today. If unsuccessful - will ask Vascular to see her. Add TID Lokelma for now, K is 5.    Addendum @ 10:43 AM Access not working, will consult Vascular.    Plan of Care:    ESRD on HD MWF  HTN  Hyponatremia  Acidosis  Secondary HPT  Anemia of CKD  AVF with non-occlusive clot on AVF US 8/22    Plan:    AVF US shows nonocclusive clot - we will attempt to needle " and run her today. If unsuccessful - will ask Vascular to see her. Add TID Lokelma for now, K is 5.    - continue HD MWF  - continue home BP meds- adjust UF goal as needed  - renal diet, 1.5L fluid restriction  - adjust dialysate  - continue binder with meals  - continue FERMÍN with HD    Thank you for allowing us to participate in this patient's care. We will continue to follow.    Vital Signs:  Temp Readings from Last 3 Encounters:   08/23/22 98 °F (36.7 °C) (Oral)   08/12/22 98 °F (36.7 °C)   07/29/22 96.7 °F (35.9 °C) (Axillary)       Pulse Readings from Last 3 Encounters:   08/23/22 73   08/12/22 66   07/29/22 75       BP Readings from Last 3 Encounters:   08/23/22 (!) 116/51   08/12/22 (!) 182/79   07/29/22 132/89       Weight:  Wt Readings from Last 3 Encounters:   08/21/22 85.7 kg (188 lb 15 oz)   08/07/22 87.3 kg (192 lb 7.4 oz)   07/24/22 78.9 kg (173 lb 14.4 oz)     Medications:  Scheduled Meds:   amLODIPine  10 mg Oral Daily    aspirin  81 mg Oral Daily    atorvastatin  80 mg Oral Daily    calcium acetate(phosphat bind)  667 mg Oral TID WM    clopidogreL  75 mg Oral Daily    epoetin chris-epbx  4,400 Units Subcutaneous Every Mon, Wed, Fri    heparin (porcine)  5,000 Units Subcutaneous Q8H    hydrALAZINE  50 mg Oral Q8H    insulin detemir U-100  10 Units Subcutaneous BID    losartan  100 mg Oral Daily    metoclopramide HCl  5 mg Oral QID (AC & HS)    sodium zirconium cyclosilicate  10 g Oral TID     Continuous Infusions:  PRN Meds:.dextrose 10%, dextrose 10%, glucagon (human recombinant), glucose, glucose, hydrALAZINE, insulin aspart U-100, melatonin, naloxone, ondansetron, oxyCODONE, senna-docusate 8.6-50 mg, sodium chloride 0.9%, sodium chloride 0.9%  No current facility-administered medications on file prior to encounter.     Current Outpatient Medications on File Prior to Encounter   Medication Sig Dispense Refill    amLODIPine (NORVASC) 10 MG tablet Take 1 tablet (10 mg total) by mouth once  daily.      aspirin (ECOTRIN) 81 MG EC tablet Take 81 mg by mouth once daily.      atorvastatin (LIPITOR) 80 MG tablet Take 1 tablet (80 mg total) by mouth once daily. 90 tablet 3    azelastine (ASTELIN) 137 mcg (0.1 %) nasal spray 1 spray (137 mcg total) by Nasal route 2 (two) times a day. 30 mL 0    calcium acetate,phosphat bind, (PHOSLO) 667 mg capsule Take 1 capsule (667 mg total) by mouth 3 (three) times daily with meals.      cetirizine (ZYRTEC) 10 MG tablet Take 1 tablet (10 mg total) by mouth every other day.      clopidogreL (PLAVIX) 75 mg tablet Take 1 tablet (75 mg total) by mouth once daily. 30 tablet 0    epoetin chris-epbx (RETACRIT) 4,000 unit/mL injection Inject 1.11 mLs (4,440 Units total) into the skin every Mon, Wed, Fri.      fluticasone propionate (FLONASE) 50 mcg/actuation nasal spray 2 sprays (100 mcg total) by Each Nostril route once daily.      gabapentin (NEURONTIN) 100 MG capsule Take 1 capsule (100 mg total) by mouth 2 (two) times daily.      hydrALAZINE (APRESOLINE) 50 MG tablet Take 1 tablet (50 mg total) by mouth every 8 (eight) hours. 90 tablet 0    insulin aspart U-100 (NOVOLOG FLEXPEN U-100 INSULIN) 100 unit/mL (3 mL) InPn pen Inject 5-8 units 3 times per day with food. (Patient taking differently: Inject 5-8 Units into the skin 3 (three) times daily with meals. Inject 5-8 units 3 times per day with food.) 1 Box 6    insulin detemir U-100 (LEVEMIR FLEXTOUCH U-100 INSULN) 100 unit/mL (3 mL) InPn pen Inject 15 Units into the skin every evening.      lactulose (CHRONULAC) 10 gram/15 mL solution Take 20 g by mouth 2 (two) times a day.      losartan (COZAAR) 100 MG tablet Take 1 tablet (100 mg total) by mouth once daily.      ondansetron (ZOFRAN-ODT) 8 MG TbDL Take 1 tablet (8 mg total) by mouth every 8 (eight) hours as needed (nausea).      oxyCODONE (ROXICODONE) 5 MG immediate release tablet Take 1 tablet (5 mg total) by mouth every 6 (six) hours as needed for Pain.    "   polyethylene glycol (GLYCOLAX) 17 gram PwPk Take 17 g by mouth 2 (two) times daily as needed.      senna-docusate 8.6-50 mg (PERICOLACE) 8.6-50 mg per tablet Take 1 tablet by mouth 2 (two) times daily.      blood sugar diagnostic Strp To check BG 4 times daily, to use with insurance preferred meter (Patient taking differently: 1 strip by Misc.(Non-Drug; Combo Route) route 4 (four) times daily. To check BG 4 times daily, to use with insurance preferred meter) 450 strip 3    pen needle, diabetic (BD ULTRA-FINE ROBERT PEN NEEDLE) 32 gauge x 5/32" Ndle To use 4 times per day with insulin injections. (Patient taking differently: 1 pen by Misc.(Non-Drug; Combo Route) route 4 (four) times daily. To use 4 times per day with insulin injections.) 450 each 2    [DISCONTINUED] blood-glucose meter (ACCU-CHEK KAYLIE PLUS METER) Misc To use to check blood sugars 4 times a day 1 each 0    [DISCONTINUED] clorazepate (TRANXENE) 3.75 MG Tab Take 7.5 mg by mouth nightly as needed.       [DISCONTINUED] dextrose (GLUCOSE GEL) 40 % gel Take 37.5 mLs (15,000 mg total) by mouth once as needed (hypoglycemia). 37.5 g 4    [DISCONTINUED] ezetimibe (ZETIA) 10 mg tablet Take 1 tablet (10 mg total) by mouth once daily.      [DISCONTINUED] fenofibrate 160 MG Tab Take 1 tablet (160 mg total) by mouth once daily. 90 tablet 3    [DISCONTINUED] lancing device with lancets (ACCU-CHEK SOFT DEV LANCETS) Kit To check blood sugars 4 times per day 400 each 3    [DISCONTINUED] pantoprazole (PROTONIX) 40 MG tablet Take 1 tablet (40 mg total) by mouth once daily.         Review of Systems:  Neg    Physical Exam:  Constitutional: nad, aao x 3  Heart: rrr, no m/r/g, no edema  Lungs: ctab, no w/r/r/c, no lb  Abdomen: s/nt/nd, +BS    Results:  Recent Labs   Lab 08/21/22  0331 08/22/22  0455 08/23/22  0741    135* 137   K 4.2 4.4 5.0   CL 98 99 98   CO2 27 26 27   BUN 49* 66* 88*   CREATININE 6.8* 7.9* 9.4*   * 150* 164*       Recent Labs "   Lab 08/21/22  0331 08/22/22  0455 08/23/22  0741   CALCIUM 9.2 9.6 9.9   ALBUMIN 3.0* 3.1* 3.1*   PHOS 4.1 4.4 4.9*   MG 2.0 2.1 2.2       Recent Labs   Lab 01/30/20  0705 03/10/22  0650   PTH, Intact 466.0 H 387.0 H       No results for input(s): POCTGLUCOSE in the last 168 hours.    Recent Labs   Lab 05/05/22  0320 05/29/22  1324 06/22/22  0618   Hemoglobin A1C 10.6 H 8.3 H 7.5 H       Recent Labs   Lab 08/21/22  0331 08/22/22  0455 08/23/22  0741   WBC 10.26 10.23 10.23   HGB 9.6* 11.2* 9.9*   HCT 31.1* 35.9* 31.9*    317 301   MCV 87 86 85   MCHC 30.9* 31.2* 31.0*   MONO 10.0  1.0 9.5  1.0 8.8  0.9       Recent Labs   Lab 08/21/22  0331 08/22/22  0455 08/23/22  0741   BILITOT 0.6 0.6 0.6   PROT 6.5 6.9 6.9   ALBUMIN 3.0* 3.1* 3.1*   ALKPHOS 57 58 62   ALT 16 17 15   AST 13 13 11       Recent Labs   Lab 05/29/22  0900 06/21/22  1612   Color, UA Yellow Yellow   Appearance, UA Clear Clear   pH, UA 8.0 >8.0 A   Specific Bock, UA 1.015 1.010   Protein, UA 2+ A 3+ A   Glucose, UA 2+ A 2+ A   Ketones, UA Negative Negative   Urobilinogen, UA Negative Negative   Bilirubin (UA) Negative Negative   Occult Blood UA Negative Negative   Nitrite, UA Negative Negative   RBC, UA 1 1   WBC, UA 5 2   Bacteria Rare Negative   Hyaline Casts, UA 0 12 A       Recent Labs   Lab 05/19/21  0300   POC PH 7.273 L   POC PCO2 47.8 H   POC HCO3 22.1 L   POC PO2 22 LL   POC SATURATED O2 30 L   POC BE -5   Sample VENOUS     I have spent > minutes providing care for this patient for the above diagnoses. These services have included chart/data/imaging review, evaluation, exam, formulation of plan, , note preparation, and discussions with staff involved in this patient's care.    Bryce Craig MD  [facility] [address] [address]  [phone] (p)  [phone] (f)

## 2022-08-23 NOTE — PROGRESS NOTES
"[facility]  Adult Nutrition   Progress Note (Initial Assessment)     SUMMARY     Recommendations  Recommendation/Intervention: 1. Advance diet as tolerated to Diabetic 1800 kcal, Renal. 2. Continue Glucerna with meals and  Wilman to TID X 14 days.  Goals: 1. Intake to be >/= 75% estimated needs for kcal and protein. 2. Lab values trend to target range.  Nutrition Goal Status: new    Dietitian Rounds Brief  LOS screen. Diabetic foot ulcer, slow healing. Pt had wound vac. Plan for bone bx  With antibiotics. Good intake. States she drinks Wilman and Glucerna. Last BM 8/19/22. Patient waiting to go to procedure. Patient followed by podiatrist and wound care.     Diet order:   Current Diet Order: NPO   Oral Nutrition Supplement: Glucran TID, Wilman TID      Evaluation of Received Nutrient/Fluid Intake  Energy Calories Required: not meeting needs  Protein Required: not meeting needs  Fluid Required: not meeting needs  Tolerance: tolerating     % Intake of Estimated Energy Needs: 50 - 75 %  % Meal Intake: 50 - 75 %    Intake/Output Summary (Last 24 hours) at 8/23/2022 1327  Last data filed at 8/22/2022 1947  Gross per 24 hour   Intake --   Output 200 ml   Net -200 ml      Anthropometrics  Temp: 97.9 °F (36.6 °C)  Height Method: Stated  Height: 5' 9" (175.3 cm)  Height (inches): 69 in  Weight Method: Bed Scale  Weight: 85.7 kg (188 lb 15 oz) (wound vac on bed when weighing)  Weight (lb): 188.94 lb  Ideal Body Weight (IBW), Female: 145 lb  % Ideal Body Weight, Female (lb): 127.57 %  BMI (Calculated): 27.9     Estimated/Assessed Needs  Weight Used For Calorie Calculations: 85.7 kg (188 lb 15 oz)  Energy Calorie Requirements (kcal): 8366-7061 kcal/day (20-25 kcal/kg)     Protein Requirements: 103-129 gm/day (1.2-1.5 gm/kg)  Weight Used For Protein Calculations: 85.7 kg (188 lb 15 oz)     Estimated Fluid Requirement Method: RDA Method  RDA Method (mL): 6256     Reason for Assessment  Reason For Assessment: length of " stay  Diagnosis: infection/sepsis, other (see comments) (diabetic ulcer)  Relevant Medical History: ESRD, DM2, gastroparesis    Nutrition/Diet History  Food Allergies: NKFA  Factors Affecting Nutritional Intake: None identified at this time    Nutrition Risk Screen  Nutrition Risk Screen: no indicators present     MST Score: 0  Have you recently lost weight without trying?: No  Weight loss score: 0  Have you been eating poorly because of a decreased appetite?: No  Appetite score: 0     Weight History:  Wt Readings from Last 5 Encounters:   08/21/22 85.7 kg (188 lb 15 oz)   08/07/22 87.3 kg (192 lb 7.4 oz)   07/24/22 78.9 kg (173 lb 14.4 oz)   06/21/22 89.1 kg (196 lb 6.9 oz)   06/13/22 89.9 kg (198 lb 3.1 oz)      Lab/Procedures/Meds: Pertinent Labs/Meds Reviewed    Medications:Pertinent Medications Reviewed  Scheduled Meds:   amLODIPine  10 mg Oral Daily    aspirin  81 mg Oral Daily    atorvastatin  80 mg Oral Daily    calcium acetate(phosphat bind)  667 mg Oral TID WM    clopidogreL  75 mg Oral Daily    epoetin chris-epbx  4,400 Units Subcutaneous Every Mon, Wed, Fri    heparin (porcine)  5,000 Units Subcutaneous Q8H    hydrALAZINE  50 mg Oral Q8H    insulin detemir U-100  10 Units Subcutaneous BID    losartan  100 mg Oral Daily    metoclopramide HCl  5 mg Oral QID (AC & HS)    sodium zirconium cyclosilicate  10 g Oral TID     Continuous Infusions:  PRN Meds:.dextrose 10%, dextrose 10%, glucagon (human recombinant), glucose, glucose, hydrALAZINE, insulin aspart U-100, melatonin, naloxone, ondansetron, oxyCODONE, senna-docusate 8.6-50 mg, sodium chloride 0.9%, sodium chloride 0.9%    Labs: Pertinent Labs Reviewed  Clinical Chemistry:  Recent Labs   Lab 08/18/22  1713 08/21/22  0331 08/22/22  0455 08/23/22  0741   * 136 135* 137   K 4.9 4.2 4.4 5.0   CL 96 98 99 98   CO2 26 27 26 27   * 186* 150* 164*   BUN 54* 49* 66* 88*   CREATININE 6.2* 6.8* 7.9* 9.4*   CALCIUM 9.5 9.2 9.6 9.9   PROT 8.6*  6.5 6.9 6.9   ALBUMIN 3.8 3.0* 3.1* 3.1*   BILITOT 0.5 0.6 0.6 0.6   ALKPHOS 79 57 58 62   AST 21 13 13 11   ALT 29 16 17 15   ANIONGAP 13 11 10 12   MG  --  2.0 2.1 2.2   PHOS  --  4.1 4.4 4.9*   LIPASE 54  --   --   --     < > = values in this interval not displayed.     CBC:   Recent Labs   Lab 08/23/22  0741   WBC 10.23   RBC 3.74*   HGB 9.9*   HCT 31.9*      MCV 85   MCH 26.5*   MCHC 31.0*     Cardiac Profile:  Recent Labs   Lab 08/18/22  1713 08/18/22  2353 08/19/22  0649   *  --   --    CPK 62  --   --    CPKMB 3.9  --   --    TROPONINI 0.032 0.057* 0.074*     Monitor and Evaluation  Food and Nutrient Intake: energy intake, food and beverage intake  Food and Nutrient Adminstration: diet order  Knowledge/Beliefs/Attitudes: food and nutrition knowledge/skill  Physical Activity and Function: nutrition-related ADLs and IADLs  Anthropometric Measurements: weight, weight change, body mass index  Biochemical Data, Medical Tests and Procedures: electrolyte and renal panel, inflammatory profile, gastrointestinal profile, lipid profile, glucose/endocrine profile  Nutrition-Focused Physical Findings: overall appearance     Nutrition Risk  Level of Risk/Frequency of Follow-up: moderate - high     Nutrition Follow-Up  RD Follow-up?: Yes      Sonia Christiansen RD, LDN 08/23/2022 1:27 PM

## 2022-08-23 NOTE — PROGRESS NOTES
ID Consult    Reason: left great toe wound    INTERVAL HISTORY:  8.22: Toe xray was worrisome for osteomyelitis and MRI confirmed it. Dr. Ponce willing to do a bone biopsy.   8/23: plans for bone biopsy noted. Wound care notes and images reviewed.       EXAM & DIAGNOSTICS REVIEWED:   Vitals:     Temp:  [97.9 °F (36.6 °C)-99 °F (37.2 °C)]   Temp: 97.9 °F (36.6 °C) (08/23/22 1140)  Pulse: 75 (08/23/22 1140)  Resp: 18 (08/23/22 1140)  BP: (!) 189/90 (08/23/22 1140)  SpO2: 98 % (08/23/22 1140)    Intake/Output Summary (Last 24 hours) at 8/23/2022 1539  Last data filed at 8/22/2022 1947  Gross per 24 hour   Intake --   Output 200 ml   Net -200 ml            8/20 7/25 8/21  As best I can recall, her distal great toe has been erythematous but it is more so now  8/22: wound vac not disturbed. Tearful about left arm graft clotting  8/23 small bone is visible and palpable           General Labs reviewed:  Recent Labs   Lab 08/21/22  0331 08/22/22  0455 08/23/22  0741   WBC 10.26 10.23 10.23   HGB 9.6* 11.2* 9.9*   HCT 31.1* 35.9* 31.9*    317 301       Recent Labs   Lab 08/21/22  0331 08/22/22  0455 08/23/22  0741    135* 137   K 4.2 4.4 5.0   CL 98 99 98   CO2 27 26 27   BUN 49* 66* 88*   CREATININE 6.8* 7.9* 9.4*   CALCIUM 9.2 9.6 9.9   PROT 6.5 6.9 6.9   BILITOT 0.6 0.6 0.6   ALKPHOS 57 58 62   ALT 16 17 15   AST 13 13 11     No results for input(s): CRP in the last 168 hours.      Micro:  Microbiology Results (last 7 days)     ** No results found for the last 168 hours. **          Imaging Reviewed:    foot xray:  Lucency at the level of the proximal aspect of the distal great toe that is similar in character as compared to previous attributed towards the flexion position of extremity digit though less conspicuous for osteomyelitis. Would advise repeat assessment with MRI of the forefoot region with contrast for optimal evaluation.    MRI of left forefoot  Acute osteomyelitis involving great toe  proximal and distal phalanges.         ASSESSMENT:  Left diabetic foot ulcer, osteomyelitis of left great toe  PAD with poor healing  ESRD with clotted fistula  DM with multiple complications    PLAN:    await bone biopsy  Restart vanc and cefepime tomorrow after biopsy

## 2022-08-23 NOTE — PROGRESS NOTES
[facility] Medicine  Progress Note    Patient name: Leanna García  MRN: [id number]  Admit Date: 8/18/2022   LOS: 3 days     SUBJECTIVE:     Principal problem: Diabetic ulcer of right midfoot associated with type 2 diabetes mellitus    Interval History:  No acute overnight events reported.  Unable to undergo dialysis secondary to thrombosis of left upper extremity AV fistula.  Vascular surgery has been consulted.  Patient denies shortness of breath or chest pain.    Scheduled Meds:   amLODIPine  10 mg Oral Daily    aspirin  81 mg Oral Daily    atorvastatin  80 mg Oral Daily    calcium acetate(phosphat bind)  667 mg Oral TID WM    clopidogreL  75 mg Oral Daily    epoetin chris-epbx  4,400 Units Subcutaneous Every Mon, Wed, Fri    heparin (porcine)  5,000 Units Subcutaneous Q8H    hydrALAZINE  50 mg Oral Q8H    insulin detemir U-100  10 Units Subcutaneous BID    losartan  100 mg Oral Daily    metoclopramide HCl  5 mg Oral QID (AC & HS)    sodium zirconium cyclosilicate  10 g Oral TID     Continuous Infusions:  PRN Meds:dextrose 10%, dextrose 10%, glucagon (human recombinant), glucose, glucose, hydrALAZINE, insulin aspart U-100, melatonin, naloxone, ondansetron, oxyCODONE, senna-docusate 8.6-50 mg, sodium chloride 0.9%, sodium chloride 0.9%    Review of patient's allergies indicates:   Allergen Reactions    Dilaudid [hydromorphone] Nausea And Vomiting    Vancomycin Itching    Chlorhexidine Itching    Lortab [hydrocodone-acetaminophen] Itching       Review of Systems: As per interval history    OBJECTIVE:     Vital Signs (Most Recent)  Temp: 97.9 °F (36.6 °C) (08/23/22 1140)  Pulse: 75 (08/23/22 1140)  Resp: 18 (08/23/22 1140)  BP: (!) 189/90 (08/23/22 1140)  SpO2: 98 % (08/23/22 1140)    Vital Signs Range (Last 24H):  Temp:  [97.9 °F (36.6 °C)-99 °F (37.2 °C)]   Pulse:  [70-77]   Resp:  [18]   BP: (116-189)/(51-98)   SpO2:  [95 %-98 %]     I & O (Last 24H):    Intake/Output  Summary (Last 24 hours) at 8/23/2022 1715  Last data filed at 8/22/2022 1947  Gross per 24 hour   Intake --   Output 200 ml   Net -200 ml       Physical Exam:  General: Patient resting comfortably in no acute distress. Appears as stated age. Calm  Eyes: No conjunctival injection. No scleral icterus.  ENT: Hearing grossly intact. No discharge from ears. No nasal discharge.   CVS: RRR. No LE edema BL  Lungs:  No tachypnea or accessory muscle use.  Clear to auscultation bilaterally  Abdomen:  Soft, nontender and nondistended.  No organomegaly  Neuro:  Alert.  Follows commands.  Skin:  Left foot dressing with wound VAC in place.  Clean, dry and intact.  Left upper extremity AV fistula with thrill    Laboratory:  I have reviewed all pertinent lab results within the past 24 hours.  CBC:   Recent Labs   Lab 08/23/22  0741   WBC 10.23   RBC 3.74*   HGB 9.9*   HCT 31.9*      MCV 85   MCH 26.5*   MCHC 31.0*     BMP:   Recent Labs   Lab 08/23/22  0741   *      K 5.0   CL 98   CO2 27   BUN 88*   CREATININE 9.4*   CALCIUM 9.9   MG 2.2       Diagnostic Results:  Labs: Reviewed  US: Reviewed  MRI: Reviewed     US Hemodialysis Access [[id number]] Collected: 08/22/22 1308   Order Status: Completed Updated: 08/22/22 1434   Narrative:     Ultrasound left upper extremity hemodialysis access     Clinical history is left upper extremity AV fistula.     Color and spectral Doppler evaluation of the left upper extremity was performed.     There is nonocclusive thrombus within the AV fistula in the left upper arm. Velocities are as follows   Left upper arm AV fistula at anastomosis proximally 81 cm/s   Left upper extremity mid AV fistula 21 cm/s   Left upper extremity distal AV fistula 14 cm/s   AV fistula at the anastomosis above the elbow 46 cm/s     IMPRESSION: Moderate amount of thrombus within the AV fistula with minimal flow noted.     Electronically signed by:  Denise Estrada MD  8/22/2022 2:31 PM CDT Workstation:  [id number]   MRI Foot (Forefoot) Left Without Contrast [[id number]] Collected: 08/22/22 0719   Order Status: Completed Updated: 08/22/22 0903   Narrative:     MR FOOT WITHOUT IV CONTRAST     CLINICAL HISTORY:   57 years Female Foot swelling, diabetic, osteomyelitis suspected, xray done Attn distal great toe left Foot swelling, diabetic, osteomyelitis suspected, xray done     COMPARISON: Left foot radiography August 21, 2022     FINDINGS: Diffuse bone marrow edema and loss of normal T1 hyperintense signal throughout the great toe distal phalanx. There is also a large area of marrow edema and decreased T1 marrow signal primarily involving the mid to distal aspect of the great toe proximal phalanx. Aforementioned findings are consistent with osteomyelitis of the great toe phalanges. Normal marrow signal intensity involving the great toe metatarsal. Physiologic great toe MTP joint fluid. Normal bone marrow signal intensity involving the second through fifth metatarsals and phalanges.     There is soft tissue swelling and skin irregularity about the great toe in this diabetic patient. No well-defined abscess/drainable fluid collection is demonstrated on this noncontrast exam. The visualized flexor tendons and extensor tendons are intact. The Lisfranc ligament is intact. Mild degenerative changes of the midfoot, partially visualized.     IMPRESSION:     Acute osteomyelitis involving great toe proximal and distal phalanges.     Electronically signed by:  Jonathan Prabhakar MD  8/22/2022 9:01 AM CDT Workstation: [id number]         ASSESSMENT/PLAN:     Active Hospital Problems    Diagnosis  POA    *Diabetic ulcer of right midfoot associated with type 2 diabetes mellitus [E11.621, L97.419]  Yes    Gastroparesis [K31.84]  Yes    Slow transit constipation [K59.01]  Yes    Ulcer of left foot with necrosis of muscle [L97.523]  Yes    PAD (peripheral artery disease) [I73.9]  Yes    Type 2 diabetes mellitus with kidney  complication, with long-term current use of insulin [E11.29, Z79.4]  Not Applicable    ESRD (end stage renal disease) [N18.6]  Yes    Hyperlipemia [E78.5]  Yes    Hypertension associated with diabetes [E11.59, I15.2]  Yes     Chronic      Resolved Hospital Problems    Diagnosis Date Resolved POA    Chest pain [R07.9] 08/19/2022 Yes         Plan:   Diabetic left foot ulcer with muscle necrosis - recently discharged from LT[facility] about 1 week prior to this hospitalization  Discussed with infectious disease provider who is aware of the patient.  Patient received a total of 10 weeks of antibiotics in the last 3 months  Status post bedside excisional debridement of left midfoot on 8/19  MRI of the left forefoot revealed acute osteomyelitis involving great toe proximal and distal phalanges.  Bone biopsy scheduled for tomorrow by Podiatry  Offload with floating heels  Appreciate input from Podiatry and Infectious Disease  Wound care following     AV fistula thrombus complicated by minimal flow  Attempts at cannulating her AV fistula with no success even today  Vascular surgery has been consulted for further evaluation  Likely intervention tomorrow  NPO from midnight  Discussed with nephrology  Scheduled potassium binders    ESRD on hemodialysis  Chronic anemia ESRD  Nephrology following for dialysis needs      Poorly-controlled type 2 DM complicated by foot ulcers  Type 2 DM.  Basal insulin along with low-dose insulin sliding scale.  Continue detemir at 10 units b.i.d.      VTE Risk Mitigation (From admission, onward)         Ordered     heparin (porcine) injection 5,000 Units  Every 8 hours         08/18/22 2311     IP VTE HIGH RISK PATIENT  Once         08/18/22 2311     Place sequential compression device  Until discontinued         08/18/22 2311                    Department Hospital Medicine  [facility]  Andrea Montoya MD  Date of service: 08/23/2022

## 2022-08-23 NOTE — CONSULTS
Removed wound vac per MD order.    Great distal toe left 1x0.5cm black dry eschar, no drainage. Great toe is red Painted with betadine.  Medial great toe ulcer 3x2x0.4cm bone, 40% yellow slough 60% red granulation,wound edges are white and macerated  there is a scant amount of tan purlent drainage at the 6 oclock position of the wound.  Medial plantar foot ulcer 1.6x2x0.8cm 95% red granulation tissue, mininal yellow slough, wound edges are macerated white. 0.3cm round open area below medial foot ulcer, DTI medial foot.  Heel 4.5x4.5cm ulcer,  is dry black eschar, painted with betadine. Foot is red aquacel ag great toe and medial plantar foot ulcer.  Covered with ABD pad, wrapped lightly with kerlix and elevated on pillows.                 Right great dorsal toe dry ulcer pink periwound, medial foot dry small scab.  Cleaned and dried and painted with betadine.  No breakdown to right heel or buttock.

## 2022-08-24 ENCOUNTER — ANESTHESIA (OUTPATIENT)
Dept: SURGERY | Facility: HOSPITAL | Age: 57
DRG: 617 | End: 2022-08-24
Payer: MEDICARE

## 2022-08-24 ENCOUNTER — ANESTHESIA EVENT (OUTPATIENT)
Dept: SURGERY | Facility: HOSPITAL | Age: 57
DRG: 617 | End: 2022-08-24
Payer: MEDICARE

## 2022-08-24 LAB
ALBUMIN SERPL BCP-MCNC: 3.2 G/DL (ref 3.5–5.2)
ALP SERPL-CCNC: 72 U/L (ref 55–135)
ALT SERPL W/O P-5'-P-CCNC: 15 U/L (ref 10–44)
ANION GAP SERPL CALC-SCNC: 13 MMOL/L (ref 8–16)
AST SERPL-CCNC: 13 U/L (ref 10–40)
BASOPHILS # BLD AUTO: 0.06 K/UL (ref 0–0.2)
BASOPHILS NFR BLD: 0.6 % (ref 0–1.9)
BILIRUB SERPL-MCNC: 0.7 MG/DL (ref 0.1–1)
BUN SERPL-MCNC: 100 MG/DL (ref 6–20)
CALCIUM SERPL-MCNC: 9.9 MG/DL (ref 8.7–10.5)
CHLORIDE SERPL-SCNC: 98 MMOL/L (ref 95–110)
CO2 SERPL-SCNC: 23 MMOL/L (ref 23–29)
CREAT SERPL-MCNC: 10 MG/DL (ref 0.5–1.4)
DIFFERENTIAL METHOD: ABNORMAL
EOSINOPHIL # BLD AUTO: 0.4 K/UL (ref 0–0.5)
EOSINOPHIL NFR BLD: 3.4 % (ref 0–8)
ERYTHROCYTE [DISTWIDTH] IN BLOOD BY AUTOMATED COUNT: 15.6 % (ref 11.5–14.5)
EST. GFR  (NO RACE VARIABLE): 4.1 ML/MIN/1.73 M^2
GLUCOSE SERPL-MCNC: 205 MG/DL (ref 70–110)
GLUCOSE SERPL-MCNC: 206 MG/DL (ref 70–110)
GRAM STN SPEC: NORMAL
HCT VFR BLD AUTO: 33.1 % (ref 37–48.5)
HGB BLD-MCNC: 10.3 G/DL (ref 12–16)
IMM GRANULOCYTES # BLD AUTO: 0.07 K/UL (ref 0–0.04)
IMM GRANULOCYTES NFR BLD AUTO: 0.7 % (ref 0–0.5)
LYMPHOCYTES # BLD AUTO: 2.3 K/UL (ref 1–4.8)
LYMPHOCYTES NFR BLD: 22 % (ref 18–48)
MAGNESIUM SERPL-MCNC: 2.3 MG/DL (ref 1.6–2.6)
MCH RBC QN AUTO: 26.7 PG (ref 27–31)
MCHC RBC AUTO-ENTMCNC: 31.1 G/DL (ref 32–36)
MCV RBC AUTO: 86 FL (ref 82–98)
MONOCYTES # BLD AUTO: 0.9 K/UL (ref 0.3–1)
MONOCYTES NFR BLD: 8.7 % (ref 4–15)
NEUTROPHILS # BLD AUTO: 6.7 K/UL (ref 1.8–7.7)
NEUTROPHILS NFR BLD: 64.6 % (ref 38–73)
NRBC BLD-RTO: 0 /100 WBC
PHOSPHATE SERPL-MCNC: 5 MG/DL (ref 2.7–4.5)
PLATELET # BLD AUTO: 327 K/UL (ref 150–450)
PMV BLD AUTO: 10.1 FL (ref 9.2–12.9)
POTASSIUM SERPL-SCNC: 5.1 MMOL/L (ref 3.5–5.1)
PROT SERPL-MCNC: 7.2 G/DL (ref 6–8.4)
RBC # BLD AUTO: 3.86 M/UL (ref 4–5.4)
SODIUM SERPL-SCNC: 134 MMOL/L (ref 136–145)
VANCOMYCIN SERPL-MCNC: 18.2 UG/ML
WBC # BLD AUTO: 10.33 K/UL (ref 3.9–12.7)

## 2022-08-24 PROCEDURE — 36415 COLL VENOUS BLD VENIPUNCTURE: CPT | Performed by: INTERNAL MEDICINE

## 2022-08-24 PROCEDURE — 25000003 PHARM REV CODE 250: Performed by: PODIATRIST

## 2022-08-24 PROCEDURE — 27201423 OPTIME MED/SURG SUP & DEVICES STERILE SUPPLY: Performed by: PODIATRIST

## 2022-08-24 PROCEDURE — 99231 SBSQ HOSP IP/OBS SF/LOW 25: CPT | Mod: ,,, | Performed by: INTERNAL MEDICINE

## 2022-08-24 PROCEDURE — 25000003 PHARM REV CODE 250: Performed by: NURSE ANESTHETIST, CERTIFIED REGISTERED

## 2022-08-24 PROCEDURE — 87118 MYCOBACTERIC IDENTIFICATION: CPT | Performed by: PODIATRIST

## 2022-08-24 PROCEDURE — 36558 INSERT TUNNELED CV CATH: CPT | Performed by: SURGERY

## 2022-08-24 PROCEDURE — 97597 PR DEBRIDEMENT OPEN WOUND 20 SQ CM<: ICD-10-PCS | Mod: 59,ICN,, | Performed by: PODIATRIST

## 2022-08-24 PROCEDURE — 94761 N-INVAS EAR/PLS OXIMETRY MLT: CPT

## 2022-08-24 PROCEDURE — 11043 DBRDMT MUSC&/FSCA 1ST 20/<: CPT | Mod: 51,ICN,, | Performed by: PODIATRIST

## 2022-08-24 PROCEDURE — 37000008 HC ANESTHESIA 1ST 15 MINUTES: Performed by: PODIATRIST

## 2022-08-24 PROCEDURE — 25000003 PHARM REV CODE 250: Performed by: NURSE PRACTITIONER

## 2022-08-24 PROCEDURE — 99231 PR SUBSEQUENT HOSPITAL CARE,LEVL I: ICD-10-PCS | Mod: ,,, | Performed by: INTERNAL MEDICINE

## 2022-08-24 PROCEDURE — 87070 CULTURE OTHR SPECIMN AEROBIC: CPT | Mod: 59 | Performed by: PODIATRIST

## 2022-08-24 PROCEDURE — C1750 CATH, HEMODIALYSIS,LONG-TERM: HCPCS | Performed by: SURGERY

## 2022-08-24 PROCEDURE — 87102 FUNGUS ISOLATION CULTURE: CPT | Mod: 59 | Performed by: PODIATRIST

## 2022-08-24 PROCEDURE — 25000003 PHARM REV CODE 250: Performed by: SURGERY

## 2022-08-24 PROCEDURE — 87206 SMEAR FLUORESCENT/ACID STAI: CPT | Mod: 91 | Performed by: PODIATRIST

## 2022-08-24 PROCEDURE — 80053 COMPREHEN METABOLIC PANEL: CPT | Performed by: NURSE PRACTITIONER

## 2022-08-24 PROCEDURE — C1725 CATH, TRANSLUMIN NON-LASER: HCPCS | Performed by: SURGERY

## 2022-08-24 PROCEDURE — 87205 SMEAR GRAM STAIN: CPT | Performed by: PODIATRIST

## 2022-08-24 PROCEDURE — 97597 DBRDMT OPN WND 1ST 20 CM/<: CPT | Mod: 59,ICN,, | Performed by: PODIATRIST

## 2022-08-24 PROCEDURE — 63600175 PHARM REV CODE 636 W HCPCS: Performed by: NURSE ANESTHETIST, CERTIFIED REGISTERED

## 2022-08-24 PROCEDURE — 99152 MOD SED SAME PHYS/QHP 5/>YRS: CPT | Performed by: SURGERY

## 2022-08-24 PROCEDURE — C1769 GUIDE WIRE: HCPCS | Performed by: SURGERY

## 2022-08-24 PROCEDURE — 20240 PR BIOPSY, BONE, OPEN, EXC; SUPERFICIAL: ICD-10-PCS | Mod: ICN,,, | Performed by: PODIATRIST

## 2022-08-24 PROCEDURE — 71000033 HC RECOVERY, INTIAL HOUR: Performed by: PODIATRIST

## 2022-08-24 PROCEDURE — 25000003 PHARM REV CODE 250: Performed by: INTERNAL MEDICINE

## 2022-08-24 PROCEDURE — C1894 INTRO/SHEATH, NON-LASER: HCPCS | Performed by: SURGERY

## 2022-08-24 PROCEDURE — 99900035 HC TECH TIME PER 15 MIN (STAT)

## 2022-08-24 PROCEDURE — 63600175 PHARM REV CODE 636 W HCPCS: Mod: JW,JG | Performed by: SURGERY

## 2022-08-24 PROCEDURE — 37000009 HC ANESTHESIA EA ADD 15 MINS: Performed by: PODIATRIST

## 2022-08-24 PROCEDURE — 63600175 PHARM REV CODE 636 W HCPCS: Performed by: INTERNAL MEDICINE

## 2022-08-24 PROCEDURE — 87205 SMEAR GRAM STAIN: CPT | Mod: 59 | Performed by: PODIATRIST

## 2022-08-24 PROCEDURE — 87077 CULTURE AEROBIC IDENTIFY: CPT | Performed by: PODIATRIST

## 2022-08-24 PROCEDURE — 36000706: Performed by: PODIATRIST

## 2022-08-24 PROCEDURE — C1887 CATHETER, GUIDING: HCPCS | Performed by: SURGERY

## 2022-08-24 PROCEDURE — 99153 MOD SED SAME PHYS/QHP EA: CPT | Performed by: SURGERY

## 2022-08-24 PROCEDURE — 99900031 HC PATIENT EDUCATION (STAT)

## 2022-08-24 PROCEDURE — 83735 ASSAY OF MAGNESIUM: CPT | Performed by: NURSE PRACTITIONER

## 2022-08-24 PROCEDURE — 36415 COLL VENOUS BLD VENIPUNCTURE: CPT | Performed by: NURSE PRACTITIONER

## 2022-08-24 PROCEDURE — 27000080 OPTIME MED/SURG SUP & DEVICES GENERAL CLASSIFICATION: Performed by: PODIATRIST

## 2022-08-24 PROCEDURE — 85025 COMPLETE CBC W/AUTO DIFF WBC: CPT | Performed by: NURSE PRACTITIONER

## 2022-08-24 PROCEDURE — 20240 BONE BIOPSY OPEN SUPERFICIAL: CPT | Mod: ICN,,, | Performed by: PODIATRIST

## 2022-08-24 PROCEDURE — 36905 THRMBC/NFS DIALYSIS CIRCUIT: CPT | Performed by: SURGERY

## 2022-08-24 PROCEDURE — 36000707: Performed by: PODIATRIST

## 2022-08-24 PROCEDURE — 80202 ASSAY OF VANCOMYCIN: CPT | Performed by: INTERNAL MEDICINE

## 2022-08-24 PROCEDURE — 12000002 HC ACUTE/MED SURGE SEMI-PRIVATE ROOM

## 2022-08-24 PROCEDURE — 84100 ASSAY OF PHOSPHORUS: CPT | Performed by: NURSE PRACTITIONER

## 2022-08-24 PROCEDURE — 87186 SC STD MICRODIL/AGAR DIL: CPT | Performed by: PODIATRIST

## 2022-08-24 PROCEDURE — 87116 MYCOBACTERIA CULTURE: CPT | Performed by: PODIATRIST

## 2022-08-24 PROCEDURE — 63600175 PHARM REV CODE 636 W HCPCS: Performed by: NURSE PRACTITIONER

## 2022-08-24 PROCEDURE — 87075 CULTR BACTERIA EXCEPT BLOOD: CPT | Performed by: PODIATRIST

## 2022-08-24 PROCEDURE — 11043 PR DEBRIDEMENT, SKIN, SUB-Q TISSUE,MUSCLE,=<20 SQ CM: ICD-10-PCS | Mod: 51,ICN,, | Performed by: PODIATRIST

## 2022-08-24 PROCEDURE — C1757 CATH, THROMBECTOMY/EMBOLECT: HCPCS | Performed by: SURGERY

## 2022-08-24 RX ORDER — LIDOCAINE HYDROCHLORIDE 10 MG/ML
INJECTION, SOLUTION EPIDURAL; INFILTRATION; INTRACAUDAL; PERINEURAL
Status: DISCONTINUED | OUTPATIENT
Start: 2022-08-24 | End: 2022-08-24 | Stop reason: HOSPADM

## 2022-08-24 RX ORDER — HEPARIN SODIUM 1000 [USP'U]/ML
INJECTION, SOLUTION INTRAVENOUS; SUBCUTANEOUS
Status: DISCONTINUED | OUTPATIENT
Start: 2022-08-24 | End: 2022-08-24 | Stop reason: HOSPADM

## 2022-08-24 RX ORDER — DIPHENHYDRAMINE HYDROCHLORIDE 50 MG/ML
12.5 INJECTION INTRAMUSCULAR; INTRAVENOUS
Status: DISCONTINUED | OUTPATIENT
Start: 2022-08-24 | End: 2022-08-24 | Stop reason: HOSPADM

## 2022-08-24 RX ORDER — MEPERIDINE HYDROCHLORIDE 50 MG/ML
12.5 INJECTION INTRAMUSCULAR; INTRAVENOUS; SUBCUTANEOUS EVERY 10 MIN PRN
Status: DISCONTINUED | OUTPATIENT
Start: 2022-08-24 | End: 2022-08-24 | Stop reason: HOSPADM

## 2022-08-24 RX ORDER — BUPIVACAINE HYDROCHLORIDE 5 MG/ML
INJECTION, SOLUTION EPIDURAL; INTRACAUDAL
Status: DISCONTINUED | OUTPATIENT
Start: 2022-08-24 | End: 2022-08-24 | Stop reason: HOSPADM

## 2022-08-24 RX ORDER — IBUPROFEN 200 MG
16 TABLET ORAL
Status: DISCONTINUED | OUTPATIENT
Start: 2022-08-24 | End: 2022-08-24 | Stop reason: HOSPADM

## 2022-08-24 RX ORDER — OXYCODONE HYDROCHLORIDE 5 MG/1
5 TABLET ORAL
Status: DISCONTINUED | OUTPATIENT
Start: 2022-08-24 | End: 2022-08-24 | Stop reason: HOSPADM

## 2022-08-24 RX ORDER — PROPOFOL 10 MG/ML
VIAL (ML) INTRAVENOUS
Status: DISCONTINUED | OUTPATIENT
Start: 2022-08-24 | End: 2022-08-24

## 2022-08-24 RX ORDER — IBUPROFEN 200 MG
24 TABLET ORAL
Status: DISCONTINUED | OUTPATIENT
Start: 2022-08-24 | End: 2022-08-24 | Stop reason: HOSPADM

## 2022-08-24 RX ORDER — FENTANYL CITRATE 50 UG/ML
INJECTION, SOLUTION INTRAMUSCULAR; INTRAVENOUS
Status: DISCONTINUED | OUTPATIENT
Start: 2022-08-24 | End: 2022-08-24 | Stop reason: HOSPADM

## 2022-08-24 RX ORDER — GLUCAGON 1 MG
1 KIT INJECTION
Status: DISCONTINUED | OUTPATIENT
Start: 2022-08-24 | End: 2022-08-24 | Stop reason: HOSPADM

## 2022-08-24 RX ORDER — CEFEPIME HYDROCHLORIDE 1 G/50ML
1 INJECTION, SOLUTION INTRAVENOUS
Status: DISCONTINUED | OUTPATIENT
Start: 2022-08-24 | End: 2022-08-25

## 2022-08-24 RX ORDER — SODIUM CHLORIDE 0.9 G/100ML
IRRIGANT IRRIGATION
Status: DISCONTINUED | OUTPATIENT
Start: 2022-08-24 | End: 2022-08-24 | Stop reason: HOSPADM

## 2022-08-24 RX ORDER — MIDAZOLAM HYDROCHLORIDE 1 MG/ML
INJECTION INTRAMUSCULAR; INTRAVENOUS
Status: DISCONTINUED | OUTPATIENT
Start: 2022-08-24 | End: 2022-08-24 | Stop reason: HOSPADM

## 2022-08-24 RX ORDER — SODIUM CHLORIDE 0.9 % (FLUSH) 0.9 %
10 SYRINGE (ML) INJECTION
Status: DISCONTINUED | OUTPATIENT
Start: 2022-08-24 | End: 2022-08-24 | Stop reason: HOSPADM

## 2022-08-24 RX ORDER — ONDANSETRON 2 MG/ML
4 INJECTION INTRAMUSCULAR; INTRAVENOUS DAILY PRN
Status: DISCONTINUED | OUTPATIENT
Start: 2022-08-24 | End: 2022-08-24 | Stop reason: HOSPADM

## 2022-08-24 RX ADMIN — SODIUM CHLORIDE: 9 INJECTION, SOLUTION INTRAVENOUS at 12:08

## 2022-08-24 RX ADMIN — ATORVASTATIN CALCIUM 80 MG: 40 TABLET, FILM COATED ORAL at 09:08

## 2022-08-24 RX ADMIN — METOCLOPRAMIDE 5 MG: 5 TABLET ORAL at 08:08

## 2022-08-24 RX ADMIN — PROPOFOL 20 MG: 10 INJECTION, EMULSION INTRAVENOUS at 12:08

## 2022-08-24 RX ADMIN — CALCIUM ACETATE 667 MG: 667 CAPSULE ORAL at 06:08

## 2022-08-24 RX ADMIN — HYDRALAZINE HYDROCHLORIDE 50 MG: 25 TABLET ORAL at 05:08

## 2022-08-24 RX ADMIN — AMLODIPINE BESYLATE 10 MG: 5 TABLET ORAL at 09:08

## 2022-08-24 RX ADMIN — HYDRALAZINE HYDROCHLORIDE 50 MG: 25 TABLET ORAL at 08:08

## 2022-08-24 RX ADMIN — OXYCODONE HYDROCHLORIDE 5 MG: 5 TABLET ORAL at 08:08

## 2022-08-24 RX ADMIN — LOSARTAN POTASSIUM 100 MG: 50 TABLET, FILM COATED ORAL at 09:08

## 2022-08-24 RX ADMIN — HEPARIN SODIUM 5000 UNITS: 5000 INJECTION INTRAVENOUS; SUBCUTANEOUS at 08:08

## 2022-08-24 RX ADMIN — SODIUM ZIRCONIUM CYCLOSILICATE 10 G: 5 POWDER, FOR SUSPENSION ORAL at 08:08

## 2022-08-24 RX ADMIN — SODIUM ZIRCONIUM CYCLOSILICATE 10 G: 5 POWDER, FOR SUSPENSION ORAL at 09:08

## 2022-08-24 RX ADMIN — CEFEPIME HYDROCHLORIDE 1 G: 1 INJECTION, SOLUTION INTRAVENOUS at 08:08

## 2022-08-24 RX ADMIN — PROPOFOL 50 MG: 10 INJECTION, EMULSION INTRAVENOUS at 12:08

## 2022-08-24 RX ADMIN — DEXTROSE 2 G: 50 INJECTION, SOLUTION INTRAVENOUS at 12:08

## 2022-08-24 NOTE — ANESTHESIA PREPROCEDURE EVALUATION
08/24/2022  Leanna García is a 57 y.o., female.      Pre-op Assessment    I have reviewed the Patient Summary Reports.     I have reviewed the Nursing Notes. I have reviewed the NPO Status.   I have reviewed the Medications.     Review of Systems  Anesthesia Hx:  Denies Family Hx of Anesthesia complications.   Denies Personal Hx of Anesthesia complications.   Social:  Non-Smoker, No Alcohol Use    Hematology/Oncology:     Oncology Normal    -- Anemia:   EENT/Dental:EENT/Dental Normal   Cardiovascular:   Hypertension Dysrhythmias atrial fibrillation hyperlipidemia 05/06/2022 echo shows 65% EF, normal diastolic function, mild MR   Pulmonary:  Pulmonary Normal    Renal/:   Chronic Renal Disease ( last dialysis 05/11/2022), ESRD, Dialysis    Musculoskeletal:   Arthritis     Neurological:   TIA, Occasional left upper extremity tremor  Peripheral Neuropathy ( feet)    Endocrine:   Diabetes (Glucose 153 this morning), poorly controlled, using insulin    Psych:   anxiety      Left foot infection.    Physical Exam  General: Well nourished and Alert    Airway:  Mallampati: III / II  Mouth Opening: Normal  TM Distance: Normal  Tongue: Normal  Neck ROM: Normal ROM    Dental:  Intact    Chest/Lungs:  Clear to auscultation, Normal Respiratory Rate    Heart:  Rate: Normal  Rhythm: Regular Rhythm  Sounds: Normal  Murmur: Systolic;        Anesthesia Plan  Type of Anesthesia, risks & benefits discussed:    Anesthesia Type: MAC  Intra-op Monitoring Plan: Standard ASA Monitors  Post Op Pain Control Plan: multimodal analgesia  Induction:  IV  Airway Plan: Direct, Post-Induction  Informed Consent: Informed consent signed with the Patient and all parties understand the risks and agree with anesthesia plan.  All questions answered. Patient consented to blood products? Yes  ASA Score: 3  Anesthesia Plan Notes: MAC with  propofol  No Decadron  Multimodal analgesia:  IV acetaminophen, local by surgeon   Antiemetics:  Zofran, Pepcid    Ready For Surgery From Anesthesia Perspective.     .

## 2022-08-24 NOTE — ANESTHESIA POSTPROCEDURE EVALUATION
Anesthesia Post Evaluation    Patient: Leanna García    Procedure(s) Performed: Procedure(s) (LRB):  Biopsy-Bone (Left)    Final Anesthesia Type: MAC      Patient location during evaluation: PACU  Patient participation: Yes- Able to Participate  Level of consciousness: awake and alert  Post-procedure vital signs: reviewed and stable  Pain management: adequate  Airway patency: patent    PONV status at discharge: No PONV  Anesthetic complications: no      Cardiovascular status: blood pressure returned to baseline and stable  Respiratory status: unassisted and room air  Hydration status: euvolemic  Follow-up not needed.          Vitals Value Taken Time   /50 08/24/22 1315   Temp 36.5 °C (97.7 °F) 08/24/22 1300   Pulse 72 08/24/22 1317   Resp 20 08/24/22 1315   SpO2 96 % 08/24/22 1317   Vitals shown include unvalidated device data.      No case tracking events are documented in the log.      Pain/Peña Score: Peña Score: 10 (8/24/2022  1:00 PM)

## 2022-08-24 NOTE — CONSULTS
[facility]  Vascular Surgery  Consult Note    Inpatient consult to Vascular Surgery  Consult performed by: Ali Khoobehi, MD  Consult ordered by: Bryce Craig MD        Subjective:     Chief Complaint/Reason for Admission:  Left foot wound    History of Present Illness:  Patient admitted for left foot wound.  She has a history of end-stage renal disease and is on dialysis via a left arm cephalic vein fistula which [facility].  Fistula could not be accessed and was pulling clots, and is noted to have clots on ultrasound.    Medications Prior to Admission   Medication Sig Dispense Refill Last Dose    amLODIPine (NORVASC) 10 MG tablet Take 1 tablet (10 mg total) by mouth once daily.   8/18/2022 at Unknown time    aspirin (ECOTRIN) 81 MG EC tablet Take 81 mg by mouth once daily.   8/18/2022 at Unknown time    atorvastatin (LIPITOR) 80 MG tablet Take 1 tablet (80 mg total) by mouth once daily. 90 tablet 3 8/18/2022 at Unknown time    azelastine (ASTELIN) 137 mcg (0.1 %) nasal spray 1 spray (137 mcg total) by Nasal route 2 (two) times a day. 30 mL 0 8/18/2022 at Unknown time    calcium acetate,phosphat bind, (PHOSLO) 667 mg capsule Take 1 capsule (667 mg total) by mouth 3 (three) times daily with meals.   8/18/2022 at Unknown time    cetirizine (ZYRTEC) 10 MG tablet Take 1 tablet (10 mg total) by mouth every other day.   8/18/2022 at Unknown time    clopidogreL (PLAVIX) 75 mg tablet Take 1 tablet (75 mg total) by mouth once daily. 30 tablet 0 8/18/2022 at Unknown time    epoetin chris-epbx (RETACRIT) 4,000 unit/mL injection Inject 1.11 mLs (4,440 Units total) into the skin every Mon, Wed, Fri.   8/17/2022 at Unknown time    fluticasone propionate (FLONASE) 50 mcg/actuation nasal spray 2 sprays (100 mcg total) by Each Nostril route once daily.   8/18/2022 at Unknown time    gabapentin (NEURONTIN) 100 MG capsule Take 1 capsule (100 mg total) by mouth 2  "(two) times daily.   8/18/2022 at Unknown time    hydrALAZINE (APRESOLINE) 50 MG tablet Take 1 tablet (50 mg total) by mouth every 8 (eight) hours. 90 tablet 0 8/18/2022 at Unknown time    insulin aspart U-100 (NOVOLOG FLEXPEN U-100 INSULIN) 100 unit/mL (3 mL) InPn pen Inject 5-8 units 3 times per day with food. (Patient taking differently: Inject 5-8 Units into the skin 3 (three) times daily with meals. Inject 5-8 units 3 times per day with food.) 1 Box 6 8/18/2022 at Unknown time    insulin detemir U-100 (LEVEMIR FLEXTOUCH U-100 INSULN) 100 unit/mL (3 mL) InPn pen Inject 15 Units into the skin every evening.   8/17/2022 at Unknown time    lactulose (CHRONULAC) 10 gram/15 mL solution Take 20 g by mouth 2 (two) times a day.   Past Week at Unknown time    losartan (COZAAR) 100 MG tablet Take 1 tablet (100 mg total) by mouth once daily.   8/18/2022 at Unknown time    ondansetron (ZOFRAN-ODT) 8 MG TbDL Take 1 tablet (8 mg total) by mouth every 8 (eight) hours as needed (nausea).   Past Month at Unknown time    oxyCODONE (ROXICODONE) 5 MG immediate release tablet Take 1 tablet (5 mg total) by mouth every 6 (six) hours as needed for Pain.   Past Month at Unknown time    polyethylene glycol (GLYCOLAX) 17 gram PwPk Take 17 g by mouth 2 (two) times daily as needed.   Past Month at Unknown time    senna-docusate 8.6-50 mg (PERICOLACE) 8.6-50 mg per tablet Take 1 tablet by mouth 2 (two) times daily.   Past Month at Unknown time    blood sugar diagnostic Strp To check BG 4 times daily, to use with insurance preferred meter (Patient taking differently: 1 strip by Misc.(Non-Drug; Combo Route) route 4 (four) times daily. To check BG 4 times daily, to use with insurance preferred meter) 450 strip 3 Unknown at Unknown time    pen needle, diabetic (BD ULTRA-FINE ROBERT PEN NEEDLE) 32 gauge x 5/32" Ndle To use 4 times per day with insulin injections. (Patient taking differently: 1 pen by Misc.(Non-Drug; Combo Route) route 4 " (four) times daily. To use 4 times per day with insulin injections.) 450 each 2 Unknown at Unknown time       Review of patient's allergies indicates:   Allergen Reactions    Dilaudid [hydromorphone] Nausea And Vomiting    Vancomycin Itching    Chlorhexidine Itching    Lortab [hydrocodone-acetaminophen] Itching       Past Medical History:   Diagnosis Date    A-fib     resolved per patient    Anemia due to end stage renal disease 6/30/2022    Arthritis     Bronchitis     Cardiovascular event risk, ASCVD 10-year risk 6.7% 10/15/2016    Diabetes mellitus type II     Diabetic foot infection     Diabetic ulcer of left midfoot 05/05/2022    Disorder of kidney and ureter     Encounter for blood transfusion     ESRD (end stage renal disease) 05/18/2018    MANJINDER (generalized anxiety disorder) 10/25/2016    History of colon polyps 11/02/2016    Hyperlipidemia     Hypertension     Stroke      Past Surgical History:   Procedure Laterality Date    BONE BIOPSY Left 8/24/2022    Procedure: Biopsy-Bone;  Surgeon: Porfirio Ponce DPM;  Location: [facility] OR;  Service: Podiatry;  Laterality: Left;    COLONOSCOPY N/A 10/5/2016    Procedure: COLONOSCOPY;  Surgeon: Pillo Chanel MD;  Location: [facility] ENDO;  Service: Endoscopy;  Laterality: N/A;    COLONOSCOPY N/A 4/26/2022    Procedure: COLONOSCOPY;  Surgeon: Saravanan Rachel MD;  Location: [facility] ENDO;  Service: Endoscopy;  Laterality: N/A;    HERNIA REPAIR Bilateral 11/22/2016    inguinal    HYSTERECTOMY      INCISION AND DRAINAGE FOOT Left 5/13/2022    Procedure: INCISION AND DRAINAGE, FOOT;  Surgeon: Ricardo Bruno DPM;  Location: [facility] OR;  Service: Podiatry;  Laterality: Left;    OOPHORECTOMY       Family History     Problem Relation (Age of Onset)    Cancer Paternal Grandmother    Diabetes Father, Sister, Sister, Maternal Aunt, Maternal Grandmother    Heart disease Maternal Grandmother    Hyperlipidemia Mother    Hypertension Mother, Father        Tobacco  Use    Smoking status: Never Smoker    Smokeless tobacco: Never Used   Substance and Sexual Activity    Alcohol use: No    Drug use: No    Sexual activity: Yes     Partners: Male     Review of Systems   All other systems reviewed and are negative.    Objective:     Vital Signs (Most Recent):  Temp: 97.7 °F (36.5 °C) (22 1300)  Pulse: 73 (22 1315)  Resp: 20 (22 1315)  BP: (!) 151/50 (22 1315)  SpO2: 95 % (22 1315) Vital Signs (24h Range):  Temp:  [97.7 °F (36.5 °C)-98.5 °F (36.9 °C)] 97.7 °F (36.5 °C)  Pulse:  [68-78] 73  Resp:  [12-20] 20  SpO2:  [94 %-97 %] 95 %  BP: (151-187)/(50-93) 151/50     Weight: 85.7 kg (188 lb 15 oz) (wound vac on bed when weighing)  Body mass index is 27.9 kg/m².    Date 22 07 - 22 0659   Shift 9921-4271 7991-0284 0870-7448 24 Hour Total   INTAKE   IV Piggyback 100   100   Shift Total(mL/kg) 100(1.2)   100(1.2)   OUTPUT   Shift Total(mL/kg)       Weight (kg) 85.7 85.7 85.7 85.7       Physical Exam  Vitals and nursing note reviewed.   Constitutional:       Appearance: Normal appearance.   HENT:      Head: Normocephalic.   Cardiovascular:      Rate and Rhythm: Normal rate and regular rhythm.      Pulses:           Femoral pulses are 2+ on the right side and 2+ on the left side.       Dorsalis pedis pulses are detected w/ Doppler on the right side and detected w/ Doppler on the left side.      Heart sounds: Normal heart sounds.      Comments: Left foot dressed  Left arm upper arm AV fistula aneurysmal, pulsatile, no thrill  Pulmonary:      Effort: Pulmonary effort is normal.      Breath sounds: Normal breath sounds.   Abdominal:      Palpations: Abdomen is soft.      Tenderness: There is no abdominal tenderness.   Musculoskeletal:      Right lower le+ Edema present.      Left lower le+ Edema present.   Neurological:      Mental Status: She is alert.         Significant Labs:  CBC:   Recent Labs   Lab 22  0607   WBC 10.33   RBC  3.86*   HGB 10.3*   HCT 33.1*      MCV 86   MCH 26.7*   MCHC 31.1*     CMP:   Recent Labs   Lab 08/24/22  0607   *   CALCIUM 9.9   ALBUMIN 3.2*   PROT 7.2   *   K 5.1   CO2 23   CL 98   *   CREATININE 10.0*   ALKPHOS 72   ALT 15   AST 13   BILITOT 0.7     Coagulation: No results for input(s): LABPROT, INR, APTT in the last 48 hours.    Significant Diagnostics:  I have reviewed all pertinent imaging results/findings within the past 24 hours.    Assessment/Plan:   Patient with left foot wounds, PAD, DMII, end-stage renal disease now with a clotted AV fistula.  Will plan for thrombectomy in the cath lab today.  The access has large venous aneurysms, and is possible she may end up needing an open revision.    Active Diagnoses:    Diagnosis Date Noted POA    PRINCIPAL PROBLEM:  Diabetic ulcer of right midfoot associated with type 2 diabetes mellitus [E11.621, L97.419]  Yes    Acute hematogenous osteomyelitis of left foot [M86.072] 08/23/2022 Yes    Gastroparesis [K31.84] 08/18/2022 Yes    Slow transit constipation [K59.01] 07/07/2022 Yes    Ulcer of left foot with necrosis of muscle [L97.523] 07/04/2022 Yes    PAD (peripheral artery disease) [I73.9] 06/27/2022 Yes    Type 2 diabetes mellitus with kidney complication, with long-term current use of insulin [E11.29, Z79.4] 06/25/2020 Not Applicable    ESRD (end stage renal disease) [N18.6] 05/18/2018 Yes    Hyperlipemia [E78.5] 04/24/2018 Yes    Hypertension associated with diabetes [E11.59, I15.2] 06/13/2013 Yes     Chronic      Problems Resolved During this Admission:    Diagnosis Date Noted Date Resolved POA    Chest pain [R07.9] 10/14/2016 08/19/2022 Yes       Thank you for your consult. I will follow-up with patient. Please contact us if you have any additional questions.    Ali Khoobehi, MD  Vascular Surgery  [facility]

## 2022-08-24 NOTE — OP NOTE
[facility]  Vascular Surgery  Operative Note    SUMMARY     Date of Procedure: 8/24/2022     Procedure: Procedure(s) (LRB):  Fistulogram (Left)  THROMBECTOMY, AV FISTULA, UPPER EXTREMITY, PERCUTANEOUS  PTA, Fistula  Insertion,catheter,tunneled     Surgeon(s) and Role:     * Ali Khoobehi, MD - Primary    Assisting Surgeon: None    Pre-Operative Diagnosis: ESRD (end stage renal disease) [N18.6]    Post-Operative Diagnosis: Post-Op Diagnosis Codes:     * ESRD (end stage renal disease) [N18.6]    Anesthesia: RN IV Sedation    Operative Findings (including complications, if any): thrombosed AVF, patent after procedure but with large thrombus containing aneurysms    Description of Technical Procedures:   Indications, risks, benefits of left arm fistulogram, thrombectomy, with possible angioplasty, possible permcath were discussed with the patient. Risks discussed included possible bleeding, access failure, infection, cardiac arrest, need for future interventions, need for permcath, pulmonary embolism, and death. Patient was agreeable for the procedure and signed consent.    Under my direct supervision, moderate sedation was administered during the course of the procedure with Versed (4 mg) and Fentanyl (150 mcgs). An independent observer was present throughout the procedure, there was continuous monitoring of cardiac telemetry, hemodynamics and pulse oximetry. I was personally present and spent 30 minutes face to face time during sedation administration.    6F sheath was placed in an antegrade fashion. Fistulogram shows no antegrade flow through the fistula which has a have clot burden.    AVX angiojet catheter was passes proximally and thrombectomy was performed.  The entire cephalic vein was angioplastied with a 6 mm balloon.  Severe stenosis was noted in the mid fistula and at the cephalic arch.  AVX catheter was passed proximally once more.  Obie catheter was passed in an antegrade fashion.   Fistulogram now shows patent flow through the cephalic vein, however with residual thrombus within the large aneurysms and in the mid fistula.  4 mg of tPA were given.  Mid fistula was treated with a 7 mm balloon, followed by an 8 mm balloon.  There is still brisk flow but still heavy clot burden.    While the fistula has a thrill, given the heavy clot burden I believe she will need a revision and open thrombectomy of her access.      Sheaths were removed and puncture site repaired with 4-0 Vicryl suture.     Right chest and neck were prepped and draped in a sterile fashion.     Under ultrasound images of the right IJ were taken. The vessel is patent and free of thrombus. Using ultrasound guidance right IJ was visualized and punctured with introducer needle.  Wire was passed proximally.  Fluoroscopy was used to confirm the correct position of the wire.  Wire tract was dilated with a 12 Cook Islander dilator followed by 14 Cook Islander dilator followed by 16 Cook Islander peel-away sheath.  19 centimeter (cuff to tip) catheter was tunneled from the right chest incision to the right neck incision.  Catheter was introduced into the sheath, and sheath was peeled off.  With the catheter completely within the patient, fluoro was used to confirm the tip at the right atriocaval junction.  Both ports were aspirated and flushed and noted to have brisk flow.  Catheter was sutured in place, neck incision closed.  The patient was brought to recovery room in stable condition.    Patient was brought out of the procedure room in stable condition.    Significant Surgical Tasks Conducted by the Assistant(s), if Applicable: n/a    Estimated Blood Loss (EBL): 10 cc          Implants:   Implant Name Type Inv. Item Serial No.  Lot No. LRB No. Used Action   HemoSplit     [id number] Right 1 Implanted and Explanted   HemoSplit 19 Cm Long Term Hemodialysis Catheter      [id number] Right 1 Implanted       Specimens:   Specimen (24h ago, onward)             None                  Condition: Good    Disposition: PACU - hemodynamically stable.    Attestation: I performed the procedure.

## 2022-08-24 NOTE — PROGRESS NOTES
ID Consult    Reason: left great toe wound    INTERVAL HISTORY:  8.22: Toe xray was worrisome for osteomyelitis and MRI confirmed it. Dr. Ponce willing to do a bone biopsy.   8/23: plans for bone biopsy noted. Wound care notes and images reviewed.   8/24:  Status post bone biopsy with findings of soft, osteomyelitic bone and an open joint.  Dr. Ponce recommends amputation of the toe and I discussed this with the patient and her  and they are in agreement.  Vascular notes reviewed with placement of a new tunneled dialysis catheter and anticipation of a revision of her left arm AV fistula.  I do not know when this is scheduled.    EXAM & DIAGNOSTICS REVIEWED:   Vitals:     Temp:  [97.7 °F (36.5 °C)-98.5 °F (36.9 °C)]   Temp: 97.7 °F (36.5 °C) (08/24/22 1300)  Pulse: 73 (08/24/22 1315)  Resp: 20 (08/24/22 1315)  BP: (!) 151/50 (08/24/22 1315)  SpO2: 95 % (08/24/22 1315)    Intake/Output Summary (Last 24 hours) at 8/24/2022 1736  Last data filed at 8/24/2022 1257  Gross per 24 hour   Intake 100 ml   Output --   Net 100 ml            8/20 7/25 8/21  As best I can recall, her distal great toe has been erythematous but it is more so now  8/22: wound vac not disturbed. Tearful about left arm graft clotting  8/23 small bone is visible and palpable      8/24:  Bandage is not disturbed     General Labs reviewed:  Recent Labs   Lab 08/22/22  0455 08/23/22  0741 08/24/22  0607   WBC 10.23 10.23 10.33   HGB 11.2* 9.9* 10.3*   HCT 35.9* 31.9* 33.1*    301 327       Recent Labs   Lab 08/22/22  0455 08/23/22  0741 08/24/22  0607   * 137 134*   K 4.4 5.0 5.1   CL 99 98 98   CO2 26 27 23   BUN 66* 88* 100*   CREATININE 7.9* 9.4* 10.0*   CALCIUM 9.6 9.9 9.9   PROT 6.9 6.9 7.2   BILITOT 0.6 0.6 0.7   ALKPHOS 58 62 72   ALT 17 15 15   AST 13 11 13     No results for input(s): CRP in the last 168 hours.      Micro:  Microbiology Results (last 7 days)     Procedure Component Value Units Date/Time     Tissue culture [[id number]] Collected: 08/24/22 1310    Order Status: No result Specimen: Tissue Updated: 08/24/22 1617    Tissue culture [[id number]] Collected: 08/24/22 1310    Order Status: No result Specimen: Bone Updated: 08/24/22 1610    Gram stain [[id number]] Collected: 08/24/22 1310    Order Status: Sent Specimen: Wound from Toe, Left Foot Updated: 08/24/22 1348    Fungus culture [[id number]] Collected: 08/24/22 1310    Order Status: Sent Specimen: Wound from Toe, Left Foot Updated: 08/24/22 1348    AFB Culture & Smear [[id number]] Collected: 08/24/22 1310    Order Status: Sent Specimen: Wound from Toe, Left Foot Updated: 08/24/22 1348    Culture, Anaerobic [[id number]] Collected: 08/24/22 1310    Order Status: Sent Specimen: Wound from Toe, Left Foot Updated: 08/24/22 1348    Aerobic culture [[id number]] Collected: 08/24/22 1310    Order Status: Sent Specimen: Wound from Toe, Left Foot Updated: 08/24/22 1347    Fungus culture [[id number]] Collected: 08/24/22 1310    Order Status: Sent Specimen: Wound from Toe, Left Foot Updated: 08/24/22 1347    AFB Culture & Smear [[id number]] Collected: 08/24/22 1310    Order Status: Sent Specimen: Wound from Toe, Left Foot Updated: 08/24/22 1347    Culture, Anaerobic [[id number]] Collected: 08/24/22 1310    Order Status: Sent Specimen: Wound from Toe, Left Foot Updated: 08/24/22 1347    Fungus culture [[id number]] Collected: 08/24/22 1310    Order Status: Sent Specimen: Wound from Toe, Left Foot Updated: 08/24/22 1345    AFB Culture & Smear [[id number]] Collected: 08/24/22 1310    Order Status: Sent Specimen: Wound from Toe, Left Foot Updated: 08/24/22 1344    Culture, Anaerobic [[id number]] Collected: 08/24/22 1310    Order Status: Sent Specimen: Wound from Toe, Left Foot Updated: 08/24/22 1344    Aerobic culture [[id number]] Collected: 08/24/22 1310    Order Status: Canceled Specimen: Bone from Toe, Left Foot     Gram stain [[id number]] Collected: 08/24/22 1310    Order Status:  Canceled Specimen: Wound from Toe, Left Foot     Aerobic culture [[id number]] Collected: 08/24/22 1310    Order Status: Canceled Specimen: Tissue from Toe, Left Foot     Gram stain [[id number]] Collected: 08/24/22 1310    Order Status: Canceled Specimen: Wound from Toe, Left Foot           Imaging Reviewed:    foot xray:  Lucency at the level of the proximal aspect of the distal great toe that is similar in character as compared to previous attributed towards the flexion position of extremity digit though less conspicuous for osteomyelitis. Would advise repeat assessment with MRI of the forefoot region with contrast for optimal evaluation.    MRI of left forefoot  Acute osteomyelitis involving great toe proximal and distal phalanges.         ASSESSMENT:  Left diabetic foot ulcer, osteomyelitis of left great toe, with exposed soft bone and joint  PAD with poor healing  ESRD with clotted fistula  DM with multiple complications    PLAN:    await bone biopsy  Restart vanc and cefepime   Anticipating amputation of the left great toe and revision of her dialysis access

## 2022-08-24 NOTE — PROGRESS NOTES
"INPATIENT NEPHROLOGY Progress Note   [facility]    Patient Name: Leanna García  Date: 08/24/2022    Reason for consultation: ESRD    Chief Complaint:   Chief Complaint   Patient presents with    Wound Infection     Diabetic pt with a right foot wound that looks to be stage II with mild redness. Pt is concerned and wanted to get it looked at "before it got too bad". Pt currently is being seen by woundcare for her other foot, presented with a wound vac.      History of Present Illness:  Leanna García is a 57 y.o. female with a history as  has a past medical history of A-fib, Anemia due to end stage renal disease (6/30/2022), Arthritis, Bronchitis, Cardiovascular event risk, ASCVD 10-year risk 6.7% (10/15/2016), Diabetes mellitus type II, Diabetic foot infection, Diabetic ulcer of left midfoot (05/05/2022), Disorder of kidney and ureter, Encounter for blood transfusion, ESRD (end stage renal disease) (05/18/2018), MANJINDER (generalized anxiety disorder) (10/25/2016), History of colon polyps (11/02/2016), Hyperlipidemia, Hypertension, and Stroke. who presented to the ED with a Wound Infection (Diabetic pt with a right foot wound that looks to be stage II with mild redness. Pt is concerned and wanted to get it looked at "before it got too bad". Pt currently is being seen by woundcare for her other foot, presented with a wound vac. )     Patient presented to the emergency for evaluation of a right foot wound check. She reports reports while she was waiting she ate a salad. Further states once she got to the ED room, she was given IV ABX and was about to be discharged when she became nauseated and started vomiting. Per the ED, she was given IV zosyn with plans to discharge.  She was then given antiemetics for the NV, but was uncontrolled. Emesis looked like undigested food, so she was then treated with Reglan. ED provider also reports patient then complained of chest pain and abdominal pain.  " "Patient reports generalized CP 20/10 PRS as describes as "feel like i'm being beating up in chest." She further states the chest pain is going all over into my stomach. While at bedside, patient started to force herself to gagged and vomit. Will admit for symptom management with antiemetics. Consult Nephrology for ESRD. Consult Dr. Bruno for diabetic foot wound.        Lab and imaging obtained and reviewed.   CBC shows hemoglobin 11.5 MCH 26.5 MCHC 30.6 RDW 15.4 lymph% 17.7.  CMP shows sodium 135 BUN 54 creatinine 6.2 GFR 7.4 glucose 233 total protein 8.6.  Chest x-ray without acute cardiopulmonary findings. XR right foot shows Degenerative changes of the foot with no acute osseous abnormality observed.     CT abdomen pelvis  IMPRESSION:   1. Incidental parapelvic gallstones.  2. Stable perinephric stranding.  3. Questionable destructive changes of the L5-S1 disc space. If an occult infection is soft tissue consider MR imaging pre and postcontrast to assess this further. This is not changed however significantly since 5/4/2022     Interval History:  8/19- thinks she has food poisoning, Dr. Bruno looking at foot wounds  8/20- wound vac replacement today, discharge per hospitalist and podiatry  8/21- had debridement and wound vac placement yest- discharge per hospitalist and podiatry  8/22 VSS. HD today. Multiple nurses unable to needle he AVF today, clots coming out, good thrill and bruit. Was working OK on Friday. Will get US AVF and attempt again in AM. DO not discharge her.  8/23 VSS. AVF US shows nonocclusive clot - we will attempt to needle and run her today. If unsuccessful - will ask Vascular to see her. Add TID Lokelma for now, K is 5.    Addendum @ 10:43 AM Access not working, will consult Vascular.    8/24 VSS. Awaiting vascular intervention on clotted HD access. K is controlled. HD later today or when access is re-established.    Plan of Care:    ESRD on HD MWF  HTN  Hyponatremia  Acidosis  Secondary " HPT  Anemia of CKD  AVF with non-occlusive clot on AVF  8/22    Plan:    Added TID Lokelma for now, until Vascular can re-establish HD access.  Hopefully can dialyze her later today or tomorrow.    - continue HD MWF  - continue home BP meds- adjust UF goal as needed  - renal diet, 1.5L fluid restriction  - adjust dialysate  - continue binder with meals  - continue FERMÍN with HD    Thank you for allowing us to participate in this patient's care. We will continue to follow.    Vital Signs:  Temp Readings from Last 3 Encounters:   08/24/22 98.3 °F (36.8 °C) (Oral)   08/12/22 98 °F (36.7 °C)   07/29/22 96.7 °F (35.9 °C) (Axillary)       Pulse Readings from Last 3 Encounters:   08/24/22 78   08/12/22 66   07/29/22 75       BP Readings from Last 3 Encounters:   08/24/22 (!) 151/80   08/12/22 (!) 182/79   07/29/22 132/89       Weight:  Wt Readings from Last 3 Encounters:   08/21/22 85.7 kg (188 lb 15 oz)   08/07/22 87.3 kg (192 lb 7.4 oz)   07/24/22 78.9 kg (173 lb 14.4 oz)     Medications:  Scheduled Meds:   amLODIPine  10 mg Oral Daily    aspirin  81 mg Oral Daily    atorvastatin  80 mg Oral Daily    calcium acetate(phosphat bind)  667 mg Oral TID WM    clopidogreL  75 mg Oral Daily    epoetin chris-epbx  4,400 Units Subcutaneous Every Mon, Wed, Fri    heparin (porcine)  5,000 Units Subcutaneous Q8H    hydrALAZINE  50 mg Oral Q8H    insulin detemir U-100  10 Units Subcutaneous BID    losartan  100 mg Oral Daily    metoclopramide HCl  5 mg Oral QID (AC & HS)    sodium zirconium cyclosilicate  10 g Oral TID     Continuous Infusions:  PRN Meds:.dextrose 10%, dextrose 10%, glucagon (human recombinant), glucose, glucose, hydrALAZINE, insulin aspart U-100, melatonin, naloxone, ondansetron, oxyCODONE, senna-docusate 8.6-50 mg, sodium chloride 0.9%, sodium chloride 0.9%  No current facility-administered medications on file prior to encounter.     Current Outpatient Medications on File Prior to Encounter   Medication  Sig Dispense Refill    amLODIPine (NORVASC) 10 MG tablet Take 1 tablet (10 mg total) by mouth once daily.      aspirin (ECOTRIN) 81 MG EC tablet Take 81 mg by mouth once daily.      atorvastatin (LIPITOR) 80 MG tablet Take 1 tablet (80 mg total) by mouth once daily. 90 tablet 3    azelastine (ASTELIN) 137 mcg (0.1 %) nasal spray 1 spray (137 mcg total) by Nasal route 2 (two) times a day. 30 mL 0    calcium acetate,phosphat bind, (PHOSLO) 667 mg capsule Take 1 capsule (667 mg total) by mouth 3 (three) times daily with meals.      cetirizine (ZYRTEC) 10 MG tablet Take 1 tablet (10 mg total) by mouth every other day.      clopidogreL (PLAVIX) 75 mg tablet Take 1 tablet (75 mg total) by mouth once daily. 30 tablet 0    epoetin chris-epbx (RETACRIT) 4,000 unit/mL injection Inject 1.11 mLs (4,440 Units total) into the skin every Mon, Wed, Fri.      fluticasone propionate (FLONASE) 50 mcg/actuation nasal spray 2 sprays (100 mcg total) by Each Nostril route once daily.      gabapentin (NEURONTIN) 100 MG capsule Take 1 capsule (100 mg total) by mouth 2 (two) times daily.      hydrALAZINE (APRESOLINE) 50 MG tablet Take 1 tablet (50 mg total) by mouth every 8 (eight) hours. 90 tablet 0    insulin aspart U-100 (NOVOLOG FLEXPEN U-100 INSULIN) 100 unit/mL (3 mL) InPn pen Inject 5-8 units 3 times per day with food. (Patient taking differently: Inject 5-8 Units into the skin 3 (three) times daily with meals. Inject 5-8 units 3 times per day with food.) 1 Box 6    insulin detemir U-100 (LEVEMIR FLEXTOUCH U-100 INSULN) 100 unit/mL (3 mL) InPn pen Inject 15 Units into the skin every evening.      lactulose (CHRONULAC) 10 gram/15 mL solution Take 20 g by mouth 2 (two) times a day.      losartan (COZAAR) 100 MG tablet Take 1 tablet (100 mg total) by mouth once daily.      ondansetron (ZOFRAN-ODT) 8 MG TbDL Take 1 tablet (8 mg total) by mouth every 8 (eight) hours as needed (nausea).      oxyCODONE (ROXICODONE) 5 MG  "immediate release tablet Take 1 tablet (5 mg total) by mouth every 6 (six) hours as needed for Pain.      polyethylene glycol (GLYCOLAX) 17 gram PwPk Take 17 g by mouth 2 (two) times daily as needed.      senna-docusate 8.6-50 mg (PERICOLACE) 8.6-50 mg per tablet Take 1 tablet by mouth 2 (two) times daily.      blood sugar diagnostic Strp To check BG 4 times daily, to use with insurance preferred meter (Patient taking differently: 1 strip by Misc.(Non-Drug; Combo Route) route 4 (four) times daily. To check BG 4 times daily, to use with insurance preferred meter) 450 strip 3    pen needle, diabetic (BD ULTRA-FINE ROBERT PEN NEEDLE) 32 gauge x 5/32" Ndle To use 4 times per day with insulin injections. (Patient taking differently: 1 pen by Misc.(Non-Drug; Combo Route) route 4 (four) times daily. To use 4 times per day with insulin injections.) 450 each 2    [DISCONTINUED] blood-glucose meter (ACCU-CHEK KAYLIE PLUS METER) Misc To use to check blood sugars 4 times a day 1 each 0    [DISCONTINUED] clorazepate (TRANXENE) 3.75 MG Tab Take 7.5 mg by mouth nightly as needed.       [DISCONTINUED] dextrose (GLUCOSE GEL) 40 % gel Take 37.5 mLs (15,000 mg total) by mouth once as needed (hypoglycemia). 37.5 g 4    [DISCONTINUED] ezetimibe (ZETIA) 10 mg tablet Take 1 tablet (10 mg total) by mouth once daily.      [DISCONTINUED] fenofibrate 160 MG Tab Take 1 tablet (160 mg total) by mouth once daily. 90 tablet 3    [DISCONTINUED] lancing device with lancets (ACCU-CHEK SOFT DEV LANCETS) Kit To check blood sugars 4 times per day 400 each 3    [DISCONTINUED] pantoprazole (PROTONIX) 40 MG tablet Take 1 tablet (40 mg total) by mouth once daily.         Review of Systems:  Neg    Physical Exam:  Constitutional: nad, aao x 3  Heart: rrr, no m/r/g, no edema  Lungs: ctab, no w/r/r/c, no lb  Abdomen: s/nt/nd, +BS    Results:  Recent Labs   Lab 08/22/22  0455 08/23/22  0741 08/24/22  0607   * 137 134*   K 4.4 5.0 5.1   CL 99 98 " 98   CO2 26 27 23   BUN 66* 88* 100*   CREATININE 7.9* 9.4* 10.0*   * 164* 206*       Recent Labs   Lab 08/22/22  0455 08/23/22  0741 08/24/22  0607   CALCIUM 9.6 9.9 9.9   ALBUMIN 3.1* 3.1* 3.2*   PHOS 4.4 4.9* 5.0*   MG 2.1 2.2 2.3       Recent Labs   Lab 01/30/20  0705 03/10/22  0650   PTH, Intact 466.0 H 387.0 H       No results for input(s): POCTGLUCOSE in the last 168 hours.    Recent Labs   Lab 05/05/22  0320 05/29/22  1324 06/22/22  0618   Hemoglobin A1C 10.6 H 8.3 H 7.5 H       Recent Labs   Lab 08/22/22  0455 08/23/22  0741 08/24/22  0607   WBC 10.23 10.23 10.33   HGB 11.2* 9.9* 10.3*   HCT 35.9* 31.9* 33.1*    301 327   MCV 86 85 86   MCHC 31.2* 31.0* 31.1*   MONO 9.5  1.0 8.8  0.9 8.7  0.9       Recent Labs   Lab 08/22/22  0455 08/23/22  0741 08/24/22  0607   BILITOT 0.6 0.6 0.7   PROT 6.9 6.9 7.2   ALBUMIN 3.1* 3.1* 3.2*   ALKPHOS 58 62 72   ALT 17 15 15   AST 13 11 13       Recent Labs   Lab 05/29/22  0900 06/21/22  1612   Color, UA Yellow Yellow   Appearance, UA Clear Clear   pH, UA 8.0 >8.0 A   Specific [place], UA 1.015 1.010   Protein, UA 2+ A 3+ A   Glucose, UA 2+ A 2+ A   Ketones, UA Negative Negative   Urobilinogen, UA Negative Negative   Bilirubin (UA) Negative Negative   Occult Blood UA Negative Negative   Nitrite, UA Negative Negative   RBC, UA 1 1   WBC, UA 5 2   Bacteria Rare Negative   Hyaline Casts, UA 0 12 A       Recent Labs   Lab 05/19/21  0300   POC PH 7.273 L   POC PCO2 47.8 H   POC HCO3 22.1 L   POC PO2 22 LL   POC SATURATED O2 30 L   POC BE -5   Sample VENOUS     I have spent > minutes providing care for this patient for the above diagnoses. These services have included chart/data/imaging review, evaluation, exam, formulation of plan, , note preparation, and discussions with staff involved in this patient's care.    Bryce Craig MD  [facility] [address] [address]  [phone] (p)  [phone] (f)

## 2022-08-24 NOTE — OP NOTE
Operative Report     Patient name: Leanna García   MRN: [id number]  Date of surgery: 8/24/2022    Surgeon: Porfirio Ponce DPM   Assistant: Suellen Chang DPM, PGY3    Preoperative diagnosis:  1. Osteomyelitis left hallux 2.  Ulceration left hallux 3. Ulceration plantar left foot  Postoperative diagnosis:  Same as above  Procedure:  1.  Bone biopsy left hallux 2. Excisional debridement ulceration left hallux 3. Superficial debridement plantar foot   Anesthesia:  Mac with local  Hemostasis:  None  Estimated blood loss:  2 cc   Specimen:  1. Bone left hallux 2.  Deep culture left hallux 3. Culture wound left hallux  Complications: None  Condition upon discharge: Stable    Procedure in detail:  Patient was wheeled to the operating room and placed on the operating table in the supine position.  Mac anesthesia was achieved by the anesthesia team and a supplementary local anesthetic block consisting of 10cc of a 1:1 mixture of 1% lidocaine and 0.5% Marcaine plain was injected around the left 1st ray.  The foot was then prepped and draped in the normal aseptic technique.  Attention was then drawn to the left hallux where the phalanx and joint were already exposed.  A deep culture was taken with a culture swab from in the joint.  A bone culture was then taken from the phalanx with a rongeur were.  And a superficial wound culture was taken by excising a piece of the wound bed with a 15. Blade.  Excisional debridement was performed down to bone with a 15 blade and skin, subcutaneous tissues, and fascia were excised.  The wound measured approximately 3 cm by 2 cm by 0.5 cm.      The plantar foot ulcer was debrided with a curette at this time. Slough and fibrotic tissue were removed. This ulceration measured 3cm x 2 cm x 0.1cm.  The foot was then dressed with Adaptic, Betadine-soaked 4 x 4 gauze, Duncan, and an Ace wrap.  Surgical shoe was applied to the foot and patient was transferred to recovery with stable vitals and  neurovascular status intact to the left foot.    1. Keep dressings, clean, dry, and intact to surgical extremity.  2. Rest, ice, and elevate the surgical extremity.  3. Surgical shoe to surgical extremity in operating room  4. Take all medication as discussed at discharge.  5. Contact the clinic with any postoperative concerns or complications.  6. Follow up in one week for 1st post op.

## 2022-08-24 NOTE — PROGRESS NOTES
[facility] Medicine  Progress Note    Patient name: Leanna García  MRN: [id number]  Admit Date: 8/18/2022   LOS: 4 days     SUBJECTIVE:     Principal problem: Diabetic ulcer of right midfoot associated with type 2 diabetes mellitus    Interval History:  No acute overnight events reported.  Intermittent mild-to-moderate pain of the diabetic foot ulcer.  Scheduled to undergo bone biopsy later in the afternoon.  Also scheduled for fistulogram and AV fistular repair.    Scheduled Meds:   amLODIPine  10 mg Oral Daily    aspirin  81 mg Oral Daily    atorvastatin  80 mg Oral Daily    calcium acetate(phosphat bind)  667 mg Oral TID WM    clopidogreL  75 mg Oral Daily    epoetin chris-epbx  4,400 Units Subcutaneous Every Mon, Wed, Fri    heparin (porcine)  5,000 Units Subcutaneous Q8H    hydrALAZINE  50 mg Oral Q8H    insulin detemir U-100  10 Units Subcutaneous BID    losartan  100 mg Oral Daily    metoclopramide HCl  5 mg Oral QID (AC & HS)    sodium zirconium cyclosilicate  10 g Oral TID     Continuous Infusions:   alteplase       PRN Meds:alteplase, dextrose 10%, dextrose 10%, diphenhydrAMINE, fentaNYL, glucagon (human recombinant), glucose, glucose, heparin (porcine), hydrALAZINE, insulin aspart U-100, LIDOcaine (PF) 10 mg/ml (1%), melatonin, meperidine, midazolam, naloxone, ondansetron, oxyCODONE, oxyCODONE, senna-docusate 8.6-50 mg, sodium chloride 0.9%, sodium chloride 0.9%, sodium chloride 0.9%    Review of patient's allergies indicates:   Allergen Reactions    Dilaudid [hydromorphone] Nausea And Vomiting    Vancomycin Itching    Chlorhexidine Itching    Lortab [hydrocodone-acetaminophen] Itching       Review of Systems: As per interval history    OBJECTIVE:     Vital Signs (Most Recent)  Temp: 97.7 °F (36.5 °C) (08/24/22 1300)  Pulse: 73 (08/24/22 1315)  Resp: 20 (08/24/22 1315)  BP: (!) 151/50 (08/24/22 1315)  SpO2: 95 % (08/24/22 1315)    Vital Signs Range (Last  24H):  Temp:  [97.7 °F (36.5 °C)-98.5 °F (36.9 °C)]   Pulse:  [68-78]   Resp:  [12-20]   BP: (147-187)/(50-93)   SpO2:  [94 %-97 %]     I & O (Last 24H):    Intake/Output Summary (Last 24 hours) at 8/24/2022 1525  Last data filed at 8/24/2022 1257  Gross per 24 hour   Intake 100 ml   Output --   Net 100 ml       Physical Exam:  General: Patient resting comfortably in no acute distress. Appears as stated age. Calm  Eyes: No conjunctival injection. No scleral icterus.  ENT: Hearing grossly intact. No discharge from ears. No nasal discharge.   CVS: RRR. No LE edema BL  Lungs:  No tachypnea or accessory muscle use.  Clear to auscultation bilaterally  Abdomen:  Soft, nontender and nondistended.  No organomegaly  Neuro:  Alert.  Follows commands.  Skin:  Left foot dressing with wound VAC in place.  Clean, dry and intact.  Left upper extremity AV fistula with thrill    Laboratory:  I have reviewed all pertinent lab results within the past 24 hours.  CBC:   Recent Labs   Lab 08/24/22  0607   WBC 10.33   RBC 3.86*   HGB 10.3*   HCT 33.1*      MCV 86   MCH 26.7*   MCHC 31.1*     BMP:   Recent Labs   Lab 08/24/22  0607   *   *   K 5.1   CL 98   CO2 23   *   CREATININE 10.0*   CALCIUM 9.9   MG 2.3       Diagnostic Results:  Labs: Reviewed     ASSESSMENT/PLAN:     Active Hospital Problems    Diagnosis  POA    *Diabetic ulcer of right midfoot associated with type 2 diabetes mellitus [E11.621, L97.419]  Yes    Acute hematogenous osteomyelitis of left foot [M86.072]  Yes    Gastroparesis [K31.84]  Yes    Slow transit constipation [K59.01]  Yes    Ulcer of left foot with necrosis of muscle [L97.523]  Yes    PAD (peripheral artery disease) [I73.9]  Yes    Type 2 diabetes mellitus with kidney complication, with long-term current use of insulin [E11.29, Z79.4]  Not Applicable    ESRD (end stage renal disease) [N18.6]  Yes    Hyperlipemia [E78.5]  Yes    Hypertension associated with diabetes [E11.59,  I15.2]  Yes     Chronic      Resolved Hospital Problems    Diagnosis Date Resolved POA    Chest pain [R07.9] 08/19/2022 Yes         Plan:   Diabetic left foot ulcer with muscle necrosis - recently discharged from LTAC after receiving about 10 weeks of antibiotics over the course of last 3 months  Status post bedside excisional debridement of left midfoot on 8/19  MRI of the left forefoot revealed acute osteomyelitis involving great toe proximal and distal phalanges  Status post bone biopsy of the left hallux, excisional debridement ulceration of the left hallux and superficial debridement of the plantar foot on 08/24  Start vancomycin and cefepime as per directions from ID  Wound care following     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  Basal insulin along with low-dose insulin sliding scale.  Continue detemir at 10 units b.i.d.      VTE Risk Mitigation (From admission, onward)         Ordered     heparin (porcine) injection  As needed (PRN)         08/24/22 1438     heparin (porcine) injection 5,000 Units  Every 8 hours         08/18/22 2311     IP VTE HIGH RISK PATIENT  Once         08/18/22 2311     Place sequential compression device  Until discontinued         08/18/22 2311                    Department Hospital Medicine  On license of [facility]  Andrea Montoya MD  Date of service: 08/24/2022

## 2022-08-24 NOTE — TRANSFER OF CARE
"Anesthesia Transfer of Care Note    Patient: Leanna García    Procedure(s) Performed: Procedure(s) (LRB):  Biopsy-Bone (Left)    Patient location: PACU    Anesthesia Type: MAC    Transport from OR: Transported from OR on 2-3 L/min O2 by NC with adequate spontaneous ventilation    Post pain: adequate analgesia    Post assessment: no apparent anesthetic complications    Post vital signs: stable    Level of consciousness: awake and alert    Nausea/Vomiting: no nausea/vomiting    Complications: none    Transfer of care protocol was followed      Last vitals:   Visit Vitals  BP (!) 151/80 (BP Location: Right arm, Patient Position: Sitting)   Pulse 78   Temp 36.8 °C (98.3 °F) (Oral)   Resp 18   Ht 5' 9" (1.753 m)   Wt 85.7 kg (188 lb 15 oz)   SpO2 (!) 94%   BMI 27.90 kg/m²     "

## 2022-08-24 NOTE — PROGRESS NOTES
Patient's AV access is now patent with a thrill, however there are large aneurysms containing heavy clot burden.  It is unlikely that the fistula will be usable until it is revised in the OR and this thrombus is evacuated.  I placed a tunneled catheter which may be use in the interim.

## 2022-08-25 LAB
ALBUMIN SERPL BCP-MCNC: 3.4 G/DL (ref 3.5–5.2)
ALP SERPL-CCNC: 67 U/L (ref 55–135)
ALT SERPL W/O P-5'-P-CCNC: 12 U/L (ref 10–44)
ANION GAP SERPL CALC-SCNC: 14 MMOL/L (ref 8–16)
AST SERPL-CCNC: 16 U/L (ref 10–40)
BASOPHILS # BLD AUTO: 0.05 K/UL (ref 0–0.2)
BASOPHILS NFR BLD: 0.5 % (ref 0–1.9)
BILIRUB SERPL-MCNC: 0.7 MG/DL (ref 0.1–1)
BUN SERPL-MCNC: 107 MG/DL (ref 6–20)
CALCIUM SERPL-MCNC: 9.9 MG/DL (ref 8.7–10.5)
CHLORIDE SERPL-SCNC: 98 MMOL/L (ref 95–110)
CO2 SERPL-SCNC: 23 MMOL/L (ref 23–29)
CREAT SERPL-MCNC: 10.9 MG/DL (ref 0.5–1.4)
DIFFERENTIAL METHOD: ABNORMAL
EOSINOPHIL # BLD AUTO: 0.3 K/UL (ref 0–0.5)
EOSINOPHIL NFR BLD: 2.9 % (ref 0–8)
ERYTHROCYTE [DISTWIDTH] IN BLOOD BY AUTOMATED COUNT: 15.7 % (ref 11.5–14.5)
EST. GFR  (NO RACE VARIABLE): 3.7 ML/MIN/1.73 M^2
GLUCOSE SERPL-MCNC: 179 MG/DL (ref 70–110)
GLUCOSE SERPL-MCNC: 203 MG/DL (ref 70–110)
GLUCOSE SERPL-MCNC: 223 MG/DL (ref 70–110)
HCT VFR BLD AUTO: 33.8 % (ref 37–48.5)
HGB BLD-MCNC: 10.5 G/DL (ref 12–16)
IMM GRANULOCYTES # BLD AUTO: 0.09 K/UL (ref 0–0.04)
IMM GRANULOCYTES NFR BLD AUTO: 0.9 % (ref 0–0.5)
LYMPHOCYTES # BLD AUTO: 1.9 K/UL (ref 1–4.8)
LYMPHOCYTES NFR BLD: 18.1 % (ref 18–48)
MAGNESIUM SERPL-MCNC: 2.2 MG/DL (ref 1.6–2.6)
MCH RBC QN AUTO: 26.7 PG (ref 27–31)
MCHC RBC AUTO-ENTMCNC: 31.1 G/DL (ref 32–36)
MCV RBC AUTO: 86 FL (ref 82–98)
MONOCYTES # BLD AUTO: 1 K/UL (ref 0.3–1)
MONOCYTES NFR BLD: 10.1 % (ref 4–15)
NEUTROPHILS # BLD AUTO: 6.9 K/UL (ref 1.8–7.7)
NEUTROPHILS NFR BLD: 67.5 % (ref 38–73)
NRBC BLD-RTO: 0 /100 WBC
PHOSPHATE SERPL-MCNC: 5 MG/DL (ref 2.7–4.5)
PLATELET # BLD AUTO: 295 K/UL (ref 150–450)
PMV BLD AUTO: 10.5 FL (ref 9.2–12.9)
POTASSIUM SERPL-SCNC: 5.2 MMOL/L (ref 3.5–5.1)
PROT SERPL-MCNC: 7.3 G/DL (ref 6–8.4)
RBC # BLD AUTO: 3.93 M/UL (ref 4–5.4)
SODIUM SERPL-SCNC: 135 MMOL/L (ref 136–145)
WBC # BLD AUTO: 10.25 K/UL (ref 3.9–12.7)

## 2022-08-25 PROCEDURE — 12000002 HC ACUTE/MED SURGE SEMI-PRIVATE ROOM

## 2022-08-25 PROCEDURE — 85025 COMPLETE CBC W/AUTO DIFF WBC: CPT | Performed by: NURSE PRACTITIONER

## 2022-08-25 PROCEDURE — 84100 ASSAY OF PHOSPHORUS: CPT | Performed by: NURSE PRACTITIONER

## 2022-08-25 PROCEDURE — 25000003 PHARM REV CODE 250: Performed by: NURSE PRACTITIONER

## 2022-08-25 PROCEDURE — 63600175 PHARM REV CODE 636 W HCPCS: Performed by: NURSE PRACTITIONER

## 2022-08-25 PROCEDURE — 63600175 PHARM REV CODE 636 W HCPCS: Performed by: INTERNAL MEDICINE

## 2022-08-25 PROCEDURE — 90935 HEMODIALYSIS ONE EVALUATION: CPT

## 2022-08-25 PROCEDURE — 25000003 PHARM REV CODE 250: Performed by: INTERNAL MEDICINE

## 2022-08-25 PROCEDURE — 83735 ASSAY OF MAGNESIUM: CPT | Performed by: NURSE PRACTITIONER

## 2022-08-25 PROCEDURE — 99231 PR SUBSEQUENT HOSPITAL CARE,LEVL I: ICD-10-PCS | Mod: ,,, | Performed by: INTERNAL MEDICINE

## 2022-08-25 PROCEDURE — 80053 COMPREHEN METABOLIC PANEL: CPT | Performed by: NURSE PRACTITIONER

## 2022-08-25 PROCEDURE — 25000003 PHARM REV CODE 250: Performed by: STUDENT IN AN ORGANIZED HEALTH CARE EDUCATION/TRAINING PROGRAM

## 2022-08-25 PROCEDURE — 99231 SBSQ HOSP IP/OBS SF/LOW 25: CPT | Mod: ,,, | Performed by: INTERNAL MEDICINE

## 2022-08-25 PROCEDURE — 36415 COLL VENOUS BLD VENIPUNCTURE: CPT | Performed by: NURSE PRACTITIONER

## 2022-08-25 RX ORDER — MUPIROCIN 20 MG/G
OINTMENT TOPICAL 2 TIMES DAILY
Status: COMPLETED | OUTPATIENT
Start: 2022-08-25 | End: 2022-08-29

## 2022-08-25 RX ORDER — MEROPENEM AND SODIUM CHLORIDE 500 MG/50ML
500 INJECTION, SOLUTION INTRAVENOUS EVERY 24 HOURS
Status: DISCONTINUED | OUTPATIENT
Start: 2022-08-25 | End: 2022-08-26

## 2022-08-25 RX ORDER — ACETAMINOPHEN 325 MG/1
650 TABLET ORAL EVERY 6 HOURS PRN
Status: DISCONTINUED | OUTPATIENT
Start: 2022-08-25 | End: 2022-08-30 | Stop reason: HOSPADM

## 2022-08-25 RX ORDER — SODIUM CHLORIDE 9 MG/ML
INJECTION, SOLUTION INTRAVENOUS ONCE
Status: DISCONTINUED | OUTPATIENT
Start: 2022-08-25 | End: 2022-08-29

## 2022-08-25 RX ADMIN — LOSARTAN POTASSIUM 100 MG: 50 TABLET, FILM COATED ORAL at 08:08

## 2022-08-25 RX ADMIN — HYDRALAZINE HYDROCHLORIDE 50 MG: 25 TABLET ORAL at 10:08

## 2022-08-25 RX ADMIN — CALCIUM ACETATE 667 MG: 667 CAPSULE ORAL at 04:08

## 2022-08-25 RX ADMIN — HEPARIN SODIUM 5000 UNITS: 5000 INJECTION INTRAVENOUS; SUBCUTANEOUS at 05:08

## 2022-08-25 RX ADMIN — HEPARIN SODIUM 5000 UNITS: 5000 INJECTION INTRAVENOUS; SUBCUTANEOUS at 10:08

## 2022-08-25 RX ADMIN — CALCIUM ACETATE 667 MG: 667 CAPSULE ORAL at 08:08

## 2022-08-25 RX ADMIN — SODIUM ZIRCONIUM CYCLOSILICATE 10 G: 5 POWDER, FOR SUSPENSION ORAL at 08:08

## 2022-08-25 RX ADMIN — ATORVASTATIN CALCIUM 80 MG: 40 TABLET, FILM COATED ORAL at 08:08

## 2022-08-25 RX ADMIN — METOCLOPRAMIDE 5 MG: 5 TABLET ORAL at 08:08

## 2022-08-25 RX ADMIN — AMLODIPINE BESYLATE 10 MG: 5 TABLET ORAL at 08:08

## 2022-08-25 RX ADMIN — MUPIROCIN 1 G: 20 OINTMENT TOPICAL at 08:08

## 2022-08-25 RX ADMIN — ACETAMINOPHEN 650 MG: 325 TABLET ORAL at 10:08

## 2022-08-25 RX ADMIN — MUPIROCIN 1 G: 20 OINTMENT TOPICAL at 02:08

## 2022-08-25 RX ADMIN — METOCLOPRAMIDE 5 MG: 5 TABLET ORAL at 05:08

## 2022-08-25 RX ADMIN — HYDRALAZINE HYDROCHLORIDE 50 MG: 25 TABLET ORAL at 05:08

## 2022-08-25 RX ADMIN — MELATONIN 6 MG: at 08:08

## 2022-08-25 RX ADMIN — CALCIUM ACETATE 667 MG: 667 CAPSULE ORAL at 02:08

## 2022-08-25 RX ADMIN — HYDRALAZINE HYDROCHLORIDE 50 MG: 25 TABLET ORAL at 04:08

## 2022-08-25 RX ADMIN — MEROPENEM AND SODIUM CHLORIDE 500 MG: 500 INJECTION, SOLUTION INTRAVENOUS at 04:08

## 2022-08-25 RX ADMIN — OXYCODONE HYDROCHLORIDE 5 MG: 5 TABLET ORAL at 04:08

## 2022-08-25 RX ADMIN — METOCLOPRAMIDE 5 MG: 5 TABLET ORAL at 04:08

## 2022-08-25 RX ADMIN — SENNOSIDES AND DOCUSATE SODIUM 1 TABLET: 50; 8.6 TABLET ORAL at 12:08

## 2022-08-25 NOTE — PROGRESS NOTES
Consent verified. Debbi well. Net UF 2L. Infection prevention education provided to pt      08/25/22 1235   Handoff Report   Received From Maryanne RN   Given To Avel BARLOW   Vital Signs   Temp 97.8 °F (36.6 °C)   Temp src Oral   Pulse 67   Heart Rate Source Monitor   Resp 16   SpO2 (!) 94 %   /62   Pre-Hemodialysis Assessment   Patient Status Departed   Treatment Status Completed   Post-Hemodialysis Assessment   Rinseback Volume (mL) 250 mL   Blood Volume Processed (Liters) 65 L   Dialyzer Clearance Lightly streaked   Additional Fluid Intake (mL) 0 mL   Total UF (mL) 2500 mL   Net Fluid Removal 2000   Patient Response to Treatment debbi well   Post-Treatment Weight 83.7 kg (184 lb 8.4 oz)   Treatment Weight Change -2   Post-Hemodialysis Comments all blood returned pt stable

## 2022-08-25 NOTE — PROGRESS NOTES
ID Consult    Reason: left great toe wound    INTERVAL HISTORY:  8.22: Toe xray was worrisome for osteomyelitis and MRI confirmed it. Dr. Ponce willing to do a bone biopsy.   8/23: plans for bone biopsy noted. Wound care notes and images reviewed.   8/24:  Status post bone biopsy with findings of soft, osteomyelitic bone and an open joint.  Dr. Ponce recommends amputation of the toe and I discussed this with the patient and her  and they are in agreement.  Vascular notes reviewed with placement of a new tunneled dialysis catheter and anticipation of a revision of her left arm AV fistula.  I do not know when this is scheduled.  8/25: interim reviewed. Tissue cultures from left great toe are growing a GNR, to be identified, like a proteus. She had some vomiting this morning after taking a pain pill on an empty stomach. She is interested in resuming physical therapy.     EXAM & DIAGNOSTICS REVIEWED:   Vitals:     Temp:  [97.8 °F (36.6 °C)-98.6 °F (37 °C)]   Temp: 97.8 °F (36.6 °C) (08/25/22 1235)  Pulse: 67 (08/25/22 1235)  Resp: 16 (08/25/22 1235)  BP: 138/62 (08/25/22 1235)  SpO2: (!) 94 % (08/25/22 1235)    Intake/Output Summary (Last 24 hours) at 8/25/2022 1509  Last data filed at 8/25/2022 1235  Gross per 24 hour   Intake 0 ml   Output 2900 ml   Net -2900 ml            8/20 7/25 8/21  As best I can recall, her distal great toe has been erythematous but it is more so now  8/22: wound vac not disturbed. Tearful about left arm graft clotting  8/23 small bone is visible and palpable      8/24:  Bandage is not disturbed  8/25: bandage not disturbed. The right sided tunneled cath is bruised and puffy. Left arm remains swollen.      General Labs reviewed:  Recent Labs   Lab 08/23/22  0741 08/24/22  0607 08/25/22  0922   WBC 10.23 10.33 10.25   HGB 9.9* 10.3* 10.5*   HCT 31.9* 33.1* 33.8*    327 295       Recent Labs   Lab 08/23/22  0741 08/24/22  0607 08/25/22  0921    134* 135*   K 5.0  5.1 5.2*   CL 98 98 98   CO2 27 23 23   BUN 88* 100* 107*   CREATININE 9.4* 10.0* 10.9*   CALCIUM 9.9 9.9 9.9   PROT 6.9 7.2 7.3   BILITOT 0.6 0.7 0.7   ALKPHOS 62 72 67   ALT 15 15 12   AST 11 13 16     No results for input(s): CRP in the last 168 hours.      Micro:  Microbiology Results (last 7 days)     Procedure Component Value Units Date/Time    Tissue culture [[id number]] Collected: 08/24/22 1310    Order Status: Completed Specimen: Tissue Updated: 08/25/22 1457     Aerobic Culture - Tissue No growth     Gram Stain Result No WBC's      No organisms seen    Narrative:      SOFT TISSUE, LEFT GREAT TOE    Tissue culture [[id number]]  (Abnormal) Collected: 08/24/22 1310    Order Status: Completed Specimen: Bone Updated: 08/25/22 1457     Aerobic Culture - Tissue GRAM NEGATIVE ARUNA, NON-LACTOSE   For susceptibility see order #[id number]       Gram Stain Result Rare WBC's      Rare Gram negative rods    Narrative:      left great toe    Aerobic culture [[id number]]  (Abnormal) Collected: 08/24/22 1310    Order Status: Completed Specimen: Wound from Toe, Left Foot Updated: 08/25/22 1004     Aerobic Bacterial Culture GRAM NEGATIVE ARUNA, NON-LACTOSE   Moderate  Identification and susceptibility pending      Narrative:      Left great toe    Gram stain [[id number]] Collected: 08/24/22 1310    Order Status: Completed Specimen: Wound from Toe, Left Foot Updated: 08/24/22 2014     Gram Stain Result Rare WBC's      Few Gram negative rods      Rare Gram positive cocci    Narrative:      Left great toe    Fungus culture [[id number]] Collected: 08/24/22 1310    Order Status: Sent Specimen: Wound from Toe, Left Foot Updated: 08/24/22 1348    AFB Culture & Smear [[id number]] Collected: 08/24/22 1310    Order Status: Sent Specimen: Wound from Toe, Left Foot Updated: 08/24/22 1348    Culture, Anaerobic [[id number]] Collected: 08/24/22 1310    Order Status: Sent Specimen: Wound from Toe, Left Foot Updated: 08/24/22 1348     Fungus culture [[id number]] Collected: 08/24/22 1310    Order Status: Sent Specimen: Wound from Toe, Left Foot Updated: 08/24/22 1347    AFB Culture & Smear [[id number]] Collected: 08/24/22 1310    Order Status: Sent Specimen: Wound from Toe, Left Foot Updated: 08/24/22 1347    Culture, Anaerobic [[id number]] Collected: 08/24/22 1310    Order Status: Sent Specimen: Wound from Toe, Left Foot Updated: 08/24/22 1347    Fungus culture [[id number]] Collected: 08/24/22 1310    Order Status: Sent Specimen: Wound from Toe, Left Foot Updated: 08/24/22 1345    AFB Culture & Smear [[id number]] Collected: 08/24/22 1310    Order Status: Sent Specimen: Wound from Toe, Left Foot Updated: 08/24/22 1344    Culture, Anaerobic [[id number]] Collected: 08/24/22 1310    Order Status: Sent Specimen: Wound from Toe, Left Foot Updated: 08/24/22 1344    Aerobic culture [[id number]] Collected: 08/24/22 1310    Order Status: Canceled Specimen: Bone from Toe, Left Foot     Gram stain [[id number]] Collected: 08/24/22 1310    Order Status: Canceled Specimen: Wound from Toe, Left Foot     Aerobic culture [[id number]] Collected: 08/24/22 1310    Order Status: Canceled Specimen: Tissue from Toe, Left Foot     Gram stain [[id number]] Collected: 08/24/22 1310    Order Status: Canceled Specimen: Wound from Toe, Left Foot           Imaging Reviewed:    foot xray:  Lucency at the level of the proximal aspect of the distal great toe that is similar in character as compared to previous attributed towards the flexion position of extremity digit though less conspicuous for osteomyelitis. Would advise repeat assessment with MRI of the forefoot region with contrast for optimal evaluation.    MRI of left forefoot  Acute osteomyelitis involving great toe proximal and distal phalanges.         ASSESSMENT:  Left diabetic foot ulcer, osteomyelitis of left great toe, with exposed soft bone and joint   Gram negative rods on bone biopsy/culture(looks like Proteus)  PAD with  poor healing  ESRD with clotted fistula  DM with multiple complications    PLAN:    await bone biopsy final culture results    vanc and will change cefepime to merrem pending the sensitivities of the proteus  Anticipating amputation of the left great toe tomorrow and revision of her dialysis access

## 2022-08-25 NOTE — PROGRESS NOTES
[facility] Medicine  Progress Note    Patient name: Leanna García  MRN: [id number]  Admit Date: 8/18/2022   LOS: 5 days     SUBJECTIVE:     Principal problem: Diabetic ulcer of right midfoot associated with type 2 diabetes mellitus    Interval History:  No acute overnight events reported.  Seen after returning from dialysis.  Pain is well controlled.  Going for left great toe amputation tomorrow.  Underwent fistulogram yesterday and was found have a large thrombus with aneurysms.    Scheduled Meds:   sodium chloride 0.9%   Intravenous Once    amLODIPine  10 mg Oral Daily    aspirin  81 mg Oral Daily    atorvastatin  80 mg Oral Daily    calcium acetate(phosphat bind)  667 mg Oral TID WM    clopidogreL  75 mg Oral Daily    epoetin chris-epbx  4,400 Units Subcutaneous Every Mon, Wed, Fri    heparin (porcine)  5,000 Units Subcutaneous Q8H    hydrALAZINE  50 mg Oral Q8H    insulin detemir U-100  10 Units Subcutaneous BID    losartan  100 mg Oral Daily    meropenem (MERREM) IVPB  500 mg Intravenous Daily    metoclopramide HCl  5 mg Oral QID (AC & HS)    mupirocin   Nasal BID    sodium zirconium cyclosilicate  10 g Oral BID     Continuous Infusions:   alteplase       PRN Meds:alteplase, hydrALAZINE, insulin aspart U-100, melatonin, naloxone, oxyCODONE, senna-docusate 8.6-50 mg, sodium chloride 0.9%, sodium chloride 0.9%, Pharmacy to dose Vancomycin consult **AND** vancomycin - pharmacy to dose    Review of patient's allergies indicates:   Allergen Reactions    Dilaudid [hydromorphone] Nausea And Vomiting    Chlorhexidine Itching    Lortab [hydrocodone-acetaminophen] Itching       Review of Systems: As per interval history    OBJECTIVE:     Vital Signs (Most Recent)  Temp: 97.8 °F (36.6 °C) (08/25/22 1235)  Pulse: 67 (08/25/22 1235)  Resp: 16 (08/25/22 1235)  BP: 138/62 (08/25/22 1235)  SpO2: (!) 94 % (08/25/22 1235)    Vital Signs Range (Last 24H):  Temp:  [97.8 °F (36.6 °C)-98.6  °F (37 °C)]   Pulse:  [65-80]   Resp:  [16-19]   BP: (103-164)/(58-79)   SpO2:  [94 %-97 %]     I & O (Last 24H):    Intake/Output Summary (Last 24 hours) at 8/25/2022 1517  Last data filed at 8/25/2022 1235  Gross per 24 hour   Intake 0 ml   Output 2900 ml   Net -2900 ml       Physical Exam:  General: Patient resting comfortably in no acute distress. Appears as stated age. Calm  Eyes: No conjunctival injection. No scleral icterus.  ENT: Hearing grossly intact. No discharge from ears. No nasal discharge.   CVS: RRR. No LE edema BL  Lungs:  No tachypnea or accessory muscle use.  Clear to auscultation bilaterally  Abdomen:  Soft, nontender and nondistended.  No organomegaly  Neuro:  Alert.  Follows commands.  Skin:  Left foot dressing - clean, dry and intact.  Left upper extremity AV fistula with thrill    Laboratory:  I have reviewed all pertinent lab results within the past 24 hours.  CBC:   Recent Labs   Lab 08/25/22  0922   WBC 10.25   RBC 3.93*   HGB 10.5*   HCT 33.8*      MCV 86   MCH 26.7*   MCHC 31.1*     BMP:   Recent Labs   Lab 08/25/22  0921   *   *   K 5.2*   CL 98   CO2 23   *   CREATININE 10.9*   CALCIUM 9.9   MG 2.2       Diagnostic Results:  Labs: Reviewed     ASSESSMENT/PLAN:     Active Hospital Problems    Diagnosis  POA    *Diabetic ulcer of right midfoot associated with type 2 diabetes mellitus [E11.621, L97.419]  Yes    Acute hematogenous osteomyelitis of left foot [M86.072]  Yes    Gastroparesis [K31.84]  Yes    Slow transit constipation [K59.01]  Yes    Ulcer of left foot with necrosis of muscle [L97.523]  Yes    PAD (peripheral artery disease) [I73.9]  Yes    Type 2 diabetes mellitus with kidney complication, with long-term current use of insulin [E11.29, Z79.4]  Not Applicable    ESRD (end stage renal disease) [N18.6]  Yes    Hyperlipemia [E78.5]  Yes    Hypertension associated with diabetes [E11.59, I15.2]  Yes     Chronic      Resolved Hospital Problems     Diagnosis Date Resolved POA    Chest pain [R07.9] 08/19/2022 Yes         Plan:   Diabetic left foot ulcer with muscle necrosis - recently discharged from LTAC after receiving about 10 weeks of antibiotics over the course of last 3 months  Status post bedside excisional debridement of left midfoot on 8/19  MRI of the left forefoot revealed acute osteomyelitis involving great toe proximal and distal phalanges  Status post bone biopsy of the left hallux, excisional debridement ulceration of the left hallux and superficial debridement of the plantar foot on 08/24  Start vancomycin and cefepime as per directions from ID  Plans for left great toe amputation planned for tomorrow     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 Khoobehi     ESRD on hemodialysis  Chronic anemia ESRD  Nephrology following for dialysis needs  Scheduled potassium binders    Poorly-controlled type 2 DM complicated by foot ulcers  Type 2 DM.  Basal insulin along with low-dose insulin sliding scale.  Increase detemir to 15 units b.i.d.      VTE Risk Mitigation (From admission, onward)         Ordered     heparin (porcine) injection 5,000 Units  Every 8 hours         08/18/22 2311     IP VTE HIGH RISK PATIENT  Once         08/18/22 2311     Place sequential compression device  Until discontinued         08/18/22 2311                    Department Hospital Medicine  [facility]  Andrea Montoya MD  Date of service: 08/25/2022

## 2022-08-25 NOTE — PROGRESS NOTES
"INPATIENT NEPHROLOGY Progress Note   [facility]    Patient Name: Leanna García  Date: 08/25/2022    Reason for consultation: ESRD    Chief Complaint:   Chief Complaint   Patient presents with    Wound Infection     Diabetic pt with a right foot wound that looks to be stage II with mild redness. Pt is concerned and wanted to get it looked at "before it got too bad". Pt currently is being seen by woundcare for her other foot, presented with a wound vac.      History of Present Illness:  Leanna García is a 57 y.o. female with a history as  has a past medical history of A-fib, Anemia due to end stage renal disease (6/30/2022), Arthritis, Bronchitis, Cardiovascular event risk, ASCVD 10-year risk 6.7% (10/15/2016), Diabetes mellitus type II, Diabetic foot infection, Diabetic ulcer of left midfoot (05/05/2022), Disorder of kidney and ureter, Encounter for blood transfusion, ESRD (end stage renal disease) (05/18/2018), MANJINDER (generalized anxiety disorder) (10/25/2016), History of colon polyps (11/02/2016), Hyperlipidemia, Hypertension, and Stroke. who presented to the ED with a Wound Infection (Diabetic pt with a right foot wound that looks to be stage II with mild redness. Pt is concerned and wanted to get it looked at "before it got too bad". Pt currently is being seen by woundcare for her other foot, presented with a wound vac. )     Patient presented to the emergency for evaluation of a right foot wound check. She reports reports while she was waiting she ate a salad. Further states once she got to the ED room, she was given IV ABX and was about to be discharged when she became nauseated and started vomiting. Per the ED, she was given IV zosyn with plans to discharge.  She was then given antiemetics for the NV, but was uncontrolled. Emesis looked like undigested food, so she was then treated with Reglan. ED provider also reports patient then complained of chest pain and abdominal pain.  " "Patient reports generalized CP 20/10 PRS as describes as "feel like i'm being beating up in chest." She further states the chest pain is going all over into my stomach. While at bedside, patient started to force herself to gagged and vomit. Will admit for symptom management with antiemetics. Consult Nephrology for ESRD. Consult Dr. Bruno for diabetic foot wound.        Lab and imaging obtained and reviewed.   CBC shows hemoglobin 11.5 MCH 26.5 MCHC 30.6 RDW 15.4 lymph% 17.7.  CMP shows sodium 135 BUN 54 creatinine 6.2 GFR 7.4 glucose 233 total protein 8.6.  Chest x-ray without acute cardiopulmonary findings. XR right foot shows Degenerative changes of the foot with no acute osseous abnormality observed.     CT abdomen pelvis  IMPRESSION:   1. Incidental parapelvic gallstones.  2. Stable perinephric stranding.  3. Questionable destructive changes of the L5-S1 disc space. If an occult infection is soft tissue consider MR imaging pre and postcontrast to assess this further. This is not changed however significantly since 5/4/2022     Interval History:  8/19- thinks she has food poisoning, Dr. Bruno looking at foot wounds  8/20- wound vac replacement today, discharge per hospitalist and podiatry  8/21- had debridement and wound vac placement yest- discharge per hospitalist and podiatry  8/22 VSS. HD today. Multiple nurses unable to needle he AVF today, clots coming out, good thrill and bruit. Was working OK on Friday. Will get US AVF and attempt again in AM. DO not discharge her.  8/23 VSS. AVF US shows nonocclusive clot - we will attempt to needle and run her today. If unsuccessful - will ask Vascular to see her. Add TID Lokelma for now, K is 5.    Addendum @ 10:43 AM Access not working, will consult Vascular.    8/24 VSS. Awaiting vascular intervention on clotted HD access. K is controlled. HD later today or when access is re-established.  8/25  Seen on dialysis, debbi well.  Scheduled for L gr toe removal tomorrow.  " VSS, Hgb 10.    Plan of Care:    ESRD on HD MWF  HTN  Hyponatremia  Acidosis  Secondary HPT  Anemia of CKD  AVF with non-occlusive clot on AVF  8/22    Plan:    Added TID Lokelma for now, until Vascular can re-establish HD access.  Hopefully can dialyze her later today or tomorrow.    - continue HD MWF  - continue home BP meds- adjust UF goal as needed  - renal diet, 1.5L fluid restriction  - adjust dialysate  - continue binder with meals  - continue FERMÍN with HD    Thank you for allowing us to participate in this patient's care. We will continue to follow.    Vital Signs:  Temp Readings from Last 3 Encounters:   08/25/22 98.3 °F (36.8 °C) (Oral)   08/12/22 98 °F (36.7 °C)   07/29/22 96.7 °F (35.9 °C) (Axillary)       Pulse Readings from Last 3 Encounters:   08/25/22 76   08/12/22 66   07/29/22 75       BP Readings from Last 3 Encounters:   08/25/22 (!) 140/64   08/12/22 (!) 182/79   07/29/22 132/89       Weight:  Wt Readings from Last 3 Encounters:   08/21/22 85.7 kg (188 lb 15 oz)   08/07/22 87.3 kg (192 lb 7.4 oz)   07/24/22 78.9 kg (173 lb 14.4 oz)     Medications:  Scheduled Meds:   sodium chloride 0.9%   Intravenous Once    amLODIPine  10 mg Oral Daily    aspirin  81 mg Oral Daily    atorvastatin  80 mg Oral Daily    calcium acetate(phosphat bind)  667 mg Oral TID WM    ceFEPime (MAXIPIME) IVPB  1 g Intravenous Q24H    clopidogreL  75 mg Oral Daily    epoetin chris-epbx  4,400 Units Subcutaneous Every Mon, Wed, Fri    heparin (porcine)  5,000 Units Subcutaneous Q8H    hydrALAZINE  50 mg Oral Q8H    insulin detemir U-100  10 Units Subcutaneous BID    losartan  100 mg Oral Daily    metoclopramide HCl  5 mg Oral QID (AC & HS)    mupirocin   Nasal BID    sodium zirconium cyclosilicate  10 g Oral BID     Continuous Infusions:   alteplase       PRN Meds:.alteplase, hydrALAZINE, insulin aspart U-100, melatonin, naloxone, oxyCODONE, senna-docusate 8.6-50 mg, sodium chloride 0.9%, sodium chloride 0.9%,  Pharmacy to dose Vancomycin consult **AND** vancomycin - pharmacy to dose  No current facility-administered medications on file prior to encounter.     Current Outpatient Medications on File Prior to Encounter   Medication Sig Dispense Refill    amLODIPine (NORVASC) 10 MG tablet Take 1 tablet (10 mg total) by mouth once daily.      aspirin (ECOTRIN) 81 MG EC tablet Take 81 mg by mouth once daily.      atorvastatin (LIPITOR) 80 MG tablet Take 1 tablet (80 mg total) by mouth once daily. 90 tablet 3    azelastine (ASTELIN) 137 mcg (0.1 %) nasal spray 1 spray (137 mcg total) by Nasal route 2 (two) times a day. 30 mL 0    calcium acetate,phosphat bind, (PHOSLO) 667 mg capsule Take 1 capsule (667 mg total) by mouth 3 (three) times daily with meals.      cetirizine (ZYRTEC) 10 MG tablet Take 1 tablet (10 mg total) by mouth every other day.      clopidogreL (PLAVIX) 75 mg tablet Take 1 tablet (75 mg total) by mouth once daily. 30 tablet 0    epoetin chris-epbx (RETACRIT) 4,000 unit/mL injection Inject 1.11 mLs (4,440 Units total) into the skin every Mon, Wed, Fri.      fluticasone propionate (FLONASE) 50 mcg/actuation nasal spray 2 sprays (100 mcg total) by Each Nostril route once daily.      gabapentin (NEURONTIN) 100 MG capsule Take 1 capsule (100 mg total) by mouth 2 (two) times daily.      hydrALAZINE (APRESOLINE) 50 MG tablet Take 1 tablet (50 mg total) by mouth every 8 (eight) hours. 90 tablet 0    insulin aspart U-100 (NOVOLOG FLEXPEN U-100 INSULIN) 100 unit/mL (3 mL) InPn pen Inject 5-8 units 3 times per day with food. (Patient taking differently: Inject 5-8 Units into the skin 3 (three) times daily with meals. Inject 5-8 units 3 times per day with food.) 1 Box 6    insulin detemir U-100 (LEVEMIR FLEXTOUCH U-100 INSULN) 100 unit/mL (3 mL) InPn pen Inject 15 Units into the skin every evening.      lactulose (CHRONULAC) 10 gram/15 mL solution Take 20 g by mouth 2 (two) times a day.      losartan (COZAAR)  "100 MG tablet Take 1 tablet (100 mg total) by mouth once daily.      ondansetron (ZOFRAN-ODT) 8 MG TbDL Take 1 tablet (8 mg total) by mouth every 8 (eight) hours as needed (nausea).      oxyCODONE (ROXICODONE) 5 MG immediate release tablet Take 1 tablet (5 mg total) by mouth every 6 (six) hours as needed for Pain.      polyethylene glycol (GLYCOLAX) 17 gram PwPk Take 17 g by mouth 2 (two) times daily as needed.      senna-docusate 8.6-50 mg (PERICOLACE) 8.6-50 mg per tablet Take 1 tablet by mouth 2 (two) times daily.      blood sugar diagnostic Strp To check BG 4 times daily, to use with insurance preferred meter (Patient taking differently: 1 strip by Misc.(Non-Drug; Combo Route) route 4 (four) times daily. To check BG 4 times daily, to use with insurance preferred meter) 450 strip 3    pen needle, diabetic (BD ULTRA-FINE ROBERT PEN NEEDLE) 32 gauge x 5/32" Ndle To use 4 times per day with insulin injections. (Patient taking differently: 1 pen by Misc.(Non-Drug; Combo Route) route 4 (four) times daily. To use 4 times per day with insulin injections.) 450 each 2    [DISCONTINUED] blood-glucose meter (ACCU-CHEK KAYLIE PLUS METER) Misc To use to check blood sugars 4 times a day 1 each 0    [DISCONTINUED] clorazepate (TRANXENE) 3.75 MG Tab Take 7.5 mg by mouth nightly as needed.       [DISCONTINUED] dextrose (GLUCOSE GEL) 40 % gel Take 37.5 mLs (15,000 mg total) by mouth once as needed (hypoglycemia). 37.5 g 4    [DISCONTINUED] ezetimibe (ZETIA) 10 mg tablet Take 1 tablet (10 mg total) by mouth once daily.      [DISCONTINUED] fenofibrate 160 MG Tab Take 1 tablet (160 mg total) by mouth once daily. 90 tablet 3    [DISCONTINUED] lancing device with lancets (ACCU-CHEK SOFT DEV LANCETS) Kit To check blood sugars 4 times per day 400 each 3    [DISCONTINUED] pantoprazole (PROTONIX) 40 MG tablet Take 1 tablet (40 mg total) by mouth once daily.         Review of Systems:  Neg    Physical Exam:  Constitutional: nad, " aao x 3  Heart: rrr, no m/r/g, no edema  Lungs: ctab, no w/r/r/c, no lb  Abdomen: s/nt/nd, +BS    Results:  Recent Labs   Lab 08/22/22  0455 08/23/22  0741 08/24/22  0607   * 137 134*   K 4.4 5.0 5.1   CL 99 98 98   CO2 26 27 23   BUN 66* 88* 100*   CREATININE 7.9* 9.4* 10.0*   * 164* 206*       Recent Labs   Lab 08/22/22  0455 08/23/22  0741 08/24/22  0607   CALCIUM 9.6 9.9 9.9   ALBUMIN 3.1* 3.1* 3.2*   PHOS 4.4 4.9* 5.0*   MG 2.1 2.2 2.3       Recent Labs   Lab 01/30/20  0705 03/10/22  0650   PTH, Intact 466.0 H 387.0 H       No results for input(s): POCTGLUCOSE in the last 168 hours.    Recent Labs   Lab 05/05/22  0320 05/29/22  1324 06/22/22  0618   Hemoglobin A1C 10.6 H 8.3 H 7.5 H       Recent Labs   Lab 08/22/22  0455 08/23/22  0741 08/24/22  0607   WBC 10.23 10.23 10.33   HGB 11.2* 9.9* 10.3*   HCT 35.9* 31.9* 33.1*    301 327   MCV 86 85 86   MCHC 31.2* 31.0* 31.1*   MONO 9.5  1.0 8.8  0.9 8.7  0.9       Recent Labs   Lab 08/22/22  0455 08/23/22  0741 08/24/22  0607   BILITOT 0.6 0.6 0.7   PROT 6.9 6.9 7.2   ALBUMIN 3.1* 3.1* 3.2*   ALKPHOS 58 62 72   ALT 17 15 15   AST 13 11 13       Recent Labs   Lab 05/29/22  0900 06/21/22  1612   Color, UA Yellow Yellow   Appearance, UA Clear Clear   pH, UA 8.0 >8.0 A   Specific Universal, UA 1.015 1.010   Protein, UA 2+ A 3+ A   Glucose, UA 2+ A 2+ A   Ketones, UA Negative Negative   Urobilinogen, UA Negative Negative   Bilirubin (UA) Negative Negative   Occult Blood UA Negative Negative   Nitrite, UA Negative Negative   RBC, UA 1 1   WBC, UA 5 2   Bacteria Rare Negative   Hyaline Casts, UA 0 12 A       Recent Labs   Lab 05/19/21  0300   POC PH 7.273 L   POC PCO2 47.8 H   POC HCO3 22.1 L   POC PO2 22 LL   POC SATURATED O2 30 L   POC BE -5   Sample VENOUS     I have spent > minutes providing care for this patient for the above diagnoses. These services have included chart/data/imaging review, evaluation, exam, formulation of plan, , note  preparation, and discussions with staff involved in this patient's care.    Emily Overton NP    [facility] [address]  [place] [address]  [phone] (p)  [phone] (f)

## 2022-08-25 NOTE — PROGRESS NOTES
"VANCOMYCIN PHARMACOKINETIC NOTE:  Vancomycin Day #5  Re-start  Objective:    57 y.o., female, Actual Body Weight = 85.7 kg (188 lb 15 oz)    Diagnosis/Indication for Vancomycin:  Skin & Soft Tissue Infection   Desired Vancomycin Peak:  30-35 mcg/ml; Desired Trough: 10-15 mcg/ml    Diagnosis/Indication for Vancomycin:  Bone/Joint Infection   Desired Vancomycin Peak:  35-40 mcg/ml; Desired Trough: 15-20 mcg/ml    Current Vancomycin Regimen: Intermittent dosing based on levels.  Last dose of Vancomycin 1500 mg  given on 8/20 at 1500.    Receiving other antibiotics:   Antibiotics (From admission, onward)                Start     Stop Route Frequency Ordered    08/24/22 1845  cefepime in dextrose 5 % 1 gram/50 mL IVPB 1 g         -- IV Every 24 hours (non-standard times) 08/24/22 1738 08/24/22 1837  vancomycin - pharmacy to dose  (vancomycin IVPB)        "And" Linked Group Details    -- IV pharmacy to manage frequency 08/24/22 1738             The patient has the following labs:     8/24/2022 Estimated Creatinine Clearance: 7.3 mL/min (A) (based on SCr of 10 mg/dL (H)). Lab Results   Component Value Date     (H) 08/24/2022     Lab Results   Component Value Date    WBC 10.33 08/24/2022        Patient receives hemodialysis intermittently, on Mondays, Wednesdays, and Fridays    Random vancomycin:  18.2  mcg/mL collected 4 days post last dose    Microbiology Results (last 7 days)       Procedure Component Value Units Date/Time    Tissue culture [[id number]] Collected: 08/24/22 1310    Order Status: Completed Specimen: Bone Updated: 08/24/22 2028     Gram Stain Result Rare WBC's      Rare Gram negative rods    Narrative:      left great toe    Tissue culture [[id number]] Collected: 08/24/22 1310    Order Status: Completed Specimen: Tissue Updated: 08/24/22 2022     Gram Stain Result No WBC's      No organisms seen    Narrative:      SOFT TISSUE, LEFT GREAT TOE    Gram stain [[id number]] Collected: 08/24/22 1310    " Order Status: Completed Specimen: Wound from Toe, Left Foot Updated: 08/24/22 2014     Gram Stain Result Rare WBC's      Few Gram negative rods      Rare Gram positive cocci    Narrative:      Left great toe    Fungus culture [[id number]] Collected: 08/24/22 1310    Order Status: Sent Specimen: Wound from Toe, Left Foot Updated: 08/24/22 1348    AFB Culture & Smear [[id number]] Collected: 08/24/22 1310    Order Status: Sent Specimen: Wound from Toe, Left Foot Updated: 08/24/22 1348    Culture, Anaerobic [[id number]] Collected: 08/24/22 1310    Order Status: Sent Specimen: Wound from Toe, Left Foot Updated: 08/24/22 1348    Aerobic culture [[id number]] Collected: 08/24/22 1310    Order Status: Sent Specimen: Wound from Toe, Left Foot Updated: 08/24/22 1347    Fungus culture [[id number]] Collected: 08/24/22 1310    Order Status: Sent Specimen: Wound from Toe, Left Foot Updated: 08/24/22 1347    AFB Culture & Smear [[id number]] Collected: 08/24/22 1310    Order Status: Sent Specimen: Wound from Toe, Left Foot Updated: 08/24/22 1347    Culture, Anaerobic [[id number]] Collected: 08/24/22 1310    Order Status: Sent Specimen: Wound from Toe, Left Foot Updated: 08/24/22 1347    Fungus culture [[id number]] Collected: 08/24/22 1310    Order Status: Sent Specimen: Wound from Toe, Left Foot Updated: 08/24/22 1345    AFB Culture & Smear [[id number]] Collected: 08/24/22 1310    Order Status: Sent Specimen: Wound from Toe, Left Foot Updated: 08/24/22 1344    Culture, Anaerobic [[id number]] Collected: 08/24/22 1310    Order Status: Sent Specimen: Wound from Toe, Left Foot Updated: 08/24/22 1344    Aerobic culture [[id number]] Collected: 08/24/22 1310    Order Status: Canceled Specimen: Bone from Toe, Left Foot     Gram stain [[id number]] Collected: 08/24/22 1310    Order Status: Canceled Specimen: Wound from Toe, Left Foot     Aerobic culture [[id number]] Collected: 08/24/22 1310    Order Status: Canceled Specimen: Tissue from Toe, Left Foot      Gram stain [[id number]] Collected: 08/24/22 1310    Order Status: Canceled Specimen: Wound from Toe, Left Foot              Assessment:  Vancomycin dose =  17.5 mg/kg  Renal function is: dependent on dialysis.  Vancomycin elimination is dependent on dialysis.  No future vancomycin doses are warranted in next 24 hours due to poor renal function.  Vancomycin half-life is prolonged with low renal function.  Serum vancomycin levels will decrease slowly      Plan:  Will obtain random vancomycin level with next hemodialysis    Pharmacy will continue to manage vancomycin therapy and adjust regimen as necessary.    Thank you for allowing us to participate in this patient's care.     Margaux Whitlock 8/24/2022 9:18 PM  Department of Pharmacy  Ext [phone]

## 2022-08-25 NOTE — PROGRESS NOTES
Patient was asleep when I entered room and did not wake up when I attempted to wake her up.  So I did not have an opportunity discussed with her the amputation of the left 1st toe although she did discuss this with Dr. Bailon yesterday and she agreed to the amputation of the 1st toe left foot.  I am going to schedule for the surgery I instructed and ask the nurse to let her know that I will schedule for tomorrow in the mid part of the morning since have surgery at another hospital.  Told him if either shear  has any questions have contact my office and I will discuss everything with him if not I will go over everything in the morning with her before surgery.

## 2022-08-26 ENCOUNTER — ANESTHESIA (OUTPATIENT)
Dept: SURGERY | Facility: HOSPITAL | Age: 57
DRG: 617 | End: 2022-08-26
Payer: MEDICARE

## 2022-08-26 ENCOUNTER — ANESTHESIA EVENT (OUTPATIENT)
Dept: SURGERY | Facility: HOSPITAL | Age: 57
DRG: 617 | End: 2022-08-26
Payer: MEDICARE

## 2022-08-26 LAB
ALBUMIN SERPL BCP-MCNC: 3.5 G/DL (ref 3.5–5.2)
ALP SERPL-CCNC: 73 U/L (ref 55–135)
ALT SERPL W/O P-5'-P-CCNC: 9 U/L (ref 10–44)
ANION GAP SERPL CALC-SCNC: 13 MMOL/L (ref 8–16)
AST SERPL-CCNC: 17 U/L (ref 10–40)
BACTERIA SPEC AEROBE CULT: ABNORMAL
BACTERIA TISS AEROBE CULT: ABNORMAL
BACTERIA TISS AEROBE CULT: ABNORMAL
BASOPHILS # BLD AUTO: 0.08 K/UL (ref 0–0.2)
BASOPHILS NFR BLD: 0.9 % (ref 0–1.9)
BILIRUB SERPL-MCNC: 0.6 MG/DL (ref 0.1–1)
BUN SERPL-MCNC: 53 MG/DL (ref 6–20)
CALCIUM SERPL-MCNC: 9.8 MG/DL (ref 8.7–10.5)
CHLORIDE SERPL-SCNC: 98 MMOL/L (ref 95–110)
CO2 SERPL-SCNC: 23 MMOL/L (ref 23–29)
CREAT SERPL-MCNC: 6.8 MG/DL (ref 0.5–1.4)
DIFFERENTIAL METHOD: ABNORMAL
EOSINOPHIL # BLD AUTO: 0.4 K/UL (ref 0–0.5)
EOSINOPHIL NFR BLD: 4.4 % (ref 0–8)
ERYTHROCYTE [DISTWIDTH] IN BLOOD BY AUTOMATED COUNT: 15.7 % (ref 11.5–14.5)
EST. GFR  (NO RACE VARIABLE): 6.6 ML/MIN/1.73 M^2
GLUCOSE SERPL-MCNC: 100 MG/DL (ref 70–110)
GLUCOSE SERPL-MCNC: 114 MG/DL (ref 70–110)
GLUCOSE SERPL-MCNC: 121 MG/DL (ref 70–110)
GLUCOSE SERPL-MCNC: 128 MG/DL (ref 70–110)
GRAM STN SPEC: ABNORMAL
HCT VFR BLD AUTO: 36.4 % (ref 37–48.5)
HGB BLD-MCNC: 11 G/DL (ref 12–16)
IMM GRANULOCYTES # BLD AUTO: 0.06 K/UL (ref 0–0.04)
IMM GRANULOCYTES NFR BLD AUTO: 0.7 % (ref 0–0.5)
LYMPHOCYTES # BLD AUTO: 2.8 K/UL (ref 1–4.8)
LYMPHOCYTES NFR BLD: 30.8 % (ref 18–48)
MAGNESIUM SERPL-MCNC: 2.2 MG/DL (ref 1.6–2.6)
MCH RBC QN AUTO: 26.6 PG (ref 27–31)
MCHC RBC AUTO-ENTMCNC: 30.2 G/DL (ref 32–36)
MCV RBC AUTO: 88 FL (ref 82–98)
MONOCYTES # BLD AUTO: 1.1 K/UL (ref 0.3–1)
MONOCYTES NFR BLD: 12.2 % (ref 4–15)
NEUTROPHILS # BLD AUTO: 4.7 K/UL (ref 1.8–7.7)
NEUTROPHILS NFR BLD: 51 % (ref 38–73)
NRBC BLD-RTO: 0 /100 WBC
PHOSPHATE SERPL-MCNC: 4 MG/DL (ref 2.7–4.5)
PLATELET # BLD AUTO: 276 K/UL (ref 150–450)
PMV BLD AUTO: 10 FL (ref 9.2–12.9)
POTASSIUM SERPL-SCNC: 4.8 MMOL/L (ref 3.5–5.1)
PROT SERPL-MCNC: 7.7 G/DL (ref 6–8.4)
RBC # BLD AUTO: 4.14 M/UL (ref 4–5.4)
SODIUM SERPL-SCNC: 134 MMOL/L (ref 136–145)
VANCOMYCIN SERPL-MCNC: 12.9 UG/ML
WBC # BLD AUTO: 9.17 K/UL (ref 3.9–12.7)

## 2022-08-26 PROCEDURE — 25000003 PHARM REV CODE 250: Performed by: INTERNAL MEDICINE

## 2022-08-26 PROCEDURE — 25000003 PHARM REV CODE 250: Performed by: PODIATRIST

## 2022-08-26 PROCEDURE — 27201423 OPTIME MED/SURG SUP & DEVICES STERILE SUPPLY: Performed by: PODIATRIST

## 2022-08-26 PROCEDURE — 94799 UNLISTED PULMONARY SVC/PX: CPT

## 2022-08-26 PROCEDURE — 71000033 HC RECOVERY, INTIAL HOUR: Performed by: PODIATRIST

## 2022-08-26 PROCEDURE — 99900031 HC PATIENT EDUCATION (STAT)

## 2022-08-26 PROCEDURE — 27000671 HC TUBING MICROBORE EXT: Performed by: ANESTHESIOLOGY

## 2022-08-26 PROCEDURE — 80202 ASSAY OF VANCOMYCIN: CPT | Performed by: INTERNAL MEDICINE

## 2022-08-26 PROCEDURE — 28810 PR AMPUTATION METATARSAL+TOE,SINGLE: ICD-10-PCS | Mod: TA,,, | Performed by: PODIATRIST

## 2022-08-26 PROCEDURE — 12000002 HC ACUTE/MED SURGE SEMI-PRIVATE ROOM

## 2022-08-26 PROCEDURE — 25000003 PHARM REV CODE 250: Performed by: NURSE ANESTHETIST, CERTIFIED REGISTERED

## 2022-08-26 PROCEDURE — 84100 ASSAY OF PHOSPHORUS: CPT | Performed by: NURSE PRACTITIONER

## 2022-08-26 PROCEDURE — 99900035 HC TECH TIME PER 15 MIN (STAT)

## 2022-08-26 PROCEDURE — 63600175 PHARM REV CODE 636 W HCPCS: Performed by: INTERNAL MEDICINE

## 2022-08-26 PROCEDURE — 36000706: Performed by: PODIATRIST

## 2022-08-26 PROCEDURE — 37000009 HC ANESTHESIA EA ADD 15 MINS: Performed by: PODIATRIST

## 2022-08-26 PROCEDURE — 25000003 PHARM REV CODE 250: Performed by: NURSE PRACTITIONER

## 2022-08-26 PROCEDURE — 27000080 OPTIME MED/SURG SUP & DEVICES GENERAL CLASSIFICATION: Performed by: PODIATRIST

## 2022-08-26 PROCEDURE — 85025 COMPLETE CBC W/AUTO DIFF WBC: CPT | Performed by: NURSE PRACTITIONER

## 2022-08-26 PROCEDURE — 83735 ASSAY OF MAGNESIUM: CPT | Performed by: NURSE PRACTITIONER

## 2022-08-26 PROCEDURE — 88305 TISSUE EXAM BY PATHOLOGIST: CPT | Mod: TC

## 2022-08-26 PROCEDURE — 27000284 HC CANNULA NASAL: Performed by: ANESTHESIOLOGY

## 2022-08-26 PROCEDURE — 63600175 PHARM REV CODE 636 W HCPCS: Mod: JG | Performed by: NURSE PRACTITIONER

## 2022-08-26 PROCEDURE — 27000675 HC TUBING MICRODRIP: Performed by: ANESTHESIOLOGY

## 2022-08-26 PROCEDURE — 90935 HEMODIALYSIS ONE EVALUATION: CPT

## 2022-08-26 PROCEDURE — 80053 COMPREHEN METABOLIC PANEL: CPT | Performed by: NURSE PRACTITIONER

## 2022-08-26 PROCEDURE — 82962 GLUCOSE BLOOD TEST: CPT | Performed by: PODIATRIST

## 2022-08-26 PROCEDURE — 94761 N-INVAS EAR/PLS OXIMETRY MLT: CPT

## 2022-08-26 PROCEDURE — 99231 PR SUBSEQUENT HOSPITAL CARE,LEVL I: ICD-10-PCS | Mod: ,,, | Performed by: INTERNAL MEDICINE

## 2022-08-26 PROCEDURE — 37000008 HC ANESTHESIA 1ST 15 MINUTES: Performed by: PODIATRIST

## 2022-08-26 PROCEDURE — 27000654 HC CATH IV JELCO: Performed by: ANESTHESIOLOGY

## 2022-08-26 PROCEDURE — 28810 AMPUTATION TOE & METATARSAL: CPT | Mod: TA,,, | Performed by: PODIATRIST

## 2022-08-26 PROCEDURE — 25000003 PHARM REV CODE 250: Performed by: STUDENT IN AN ORGANIZED HEALTH CARE EDUCATION/TRAINING PROGRAM

## 2022-08-26 PROCEDURE — 36000707: Performed by: PODIATRIST

## 2022-08-26 PROCEDURE — 63600175 PHARM REV CODE 636 W HCPCS: Performed by: NURSE ANESTHETIST, CERTIFIED REGISTERED

## 2022-08-26 PROCEDURE — 99231 SBSQ HOSP IP/OBS SF/LOW 25: CPT | Mod: ,,, | Performed by: INTERNAL MEDICINE

## 2022-08-26 RX ORDER — MIDAZOLAM HYDROCHLORIDE 1 MG/ML
INJECTION INTRAMUSCULAR; INTRAVENOUS
Status: DISCONTINUED | OUTPATIENT
Start: 2022-08-26 | End: 2022-08-26

## 2022-08-26 RX ORDER — MEPERIDINE HYDROCHLORIDE 50 MG/ML
12.5 INJECTION INTRAMUSCULAR; INTRAVENOUS; SUBCUTANEOUS EVERY 10 MIN PRN
Status: DISCONTINUED | OUTPATIENT
Start: 2022-08-26 | End: 2022-08-26 | Stop reason: HOSPADM

## 2022-08-26 RX ORDER — PROPOFOL 10 MG/ML
VIAL (ML) INTRAVENOUS
Status: DISCONTINUED | OUTPATIENT
Start: 2022-08-26 | End: 2022-08-26

## 2022-08-26 RX ORDER — FAMOTIDINE 10 MG/ML
INJECTION INTRAVENOUS
Status: DISCONTINUED | OUTPATIENT
Start: 2022-08-26 | End: 2022-08-26

## 2022-08-26 RX ORDER — OXYCODONE HYDROCHLORIDE 5 MG/1
5 TABLET ORAL
Status: DISCONTINUED | OUTPATIENT
Start: 2022-08-26 | End: 2022-08-26 | Stop reason: HOSPADM

## 2022-08-26 RX ORDER — DIPHENHYDRAMINE HYDROCHLORIDE 50 MG/ML
12.5 INJECTION INTRAMUSCULAR; INTRAVENOUS
Status: DISCONTINUED | OUTPATIENT
Start: 2022-08-26 | End: 2022-08-26 | Stop reason: HOSPADM

## 2022-08-26 RX ORDER — HYDROMORPHONE HYDROCHLORIDE 1 MG/ML
0.2 INJECTION, SOLUTION INTRAMUSCULAR; INTRAVENOUS; SUBCUTANEOUS EVERY 5 MIN PRN
Status: DISCONTINUED | OUTPATIENT
Start: 2022-08-26 | End: 2022-08-26 | Stop reason: HOSPADM

## 2022-08-26 RX ORDER — ONDANSETRON 2 MG/ML
4 INJECTION INTRAMUSCULAR; INTRAVENOUS EVERY 6 HOURS PRN
Status: DISCONTINUED | OUTPATIENT
Start: 2022-08-26 | End: 2022-08-30 | Stop reason: HOSPADM

## 2022-08-26 RX ORDER — ONDANSETRON 2 MG/ML
4 INJECTION INTRAMUSCULAR; INTRAVENOUS DAILY PRN
Status: DISCONTINUED | OUTPATIENT
Start: 2022-08-26 | End: 2022-08-26 | Stop reason: HOSPADM

## 2022-08-26 RX ORDER — HEPARIN SODIUM 1000 [USP'U]/ML
4000 INJECTION, SOLUTION INTRAVENOUS; SUBCUTANEOUS
Status: DISCONTINUED | OUTPATIENT
Start: 2022-08-26 | End: 2022-08-30 | Stop reason: HOSPADM

## 2022-08-26 RX ORDER — FENTANYL CITRATE 50 UG/ML
INJECTION, SOLUTION INTRAMUSCULAR; INTRAVENOUS
Status: DISCONTINUED | OUTPATIENT
Start: 2022-08-26 | End: 2022-08-26

## 2022-08-26 RX ORDER — ONDANSETRON 2 MG/ML
INJECTION INTRAMUSCULAR; INTRAVENOUS
Status: DISCONTINUED | OUTPATIENT
Start: 2022-08-26 | End: 2022-08-26

## 2022-08-26 RX ORDER — BUPIVACAINE HYDROCHLORIDE 5 MG/ML
INJECTION, SOLUTION EPIDURAL; INTRACAUDAL
Status: DISCONTINUED | OUTPATIENT
Start: 2022-08-26 | End: 2022-08-26 | Stop reason: HOSPADM

## 2022-08-26 RX ORDER — SODIUM CHLORIDE 0.9 % (FLUSH) 0.9 %
10 SYRINGE (ML) INJECTION
Status: DISCONTINUED | OUTPATIENT
Start: 2022-08-26 | End: 2022-08-30 | Stop reason: HOSPADM

## 2022-08-26 RX ORDER — LIDOCAINE HYDROCHLORIDE 10 MG/ML
INJECTION INFILTRATION; PERINEURAL
Status: DISCONTINUED | OUTPATIENT
Start: 2022-08-26 | End: 2022-08-26 | Stop reason: HOSPADM

## 2022-08-26 RX ORDER — CEFEPIME HYDROCHLORIDE 1 G/50ML
1 INJECTION, SOLUTION INTRAVENOUS
Status: DISCONTINUED | OUTPATIENT
Start: 2022-08-26 | End: 2022-08-30 | Stop reason: HOSPADM

## 2022-08-26 RX ADMIN — HYDRALAZINE HYDROCHLORIDE 50 MG: 25 TABLET ORAL at 01:08

## 2022-08-26 RX ADMIN — AMLODIPINE BESYLATE 10 MG: 5 TABLET ORAL at 08:08

## 2022-08-26 RX ADMIN — LOSARTAN POTASSIUM 100 MG: 50 TABLET, FILM COATED ORAL at 08:08

## 2022-08-26 RX ADMIN — HEPARIN SODIUM 5000 UNITS: 5000 INJECTION INTRAVENOUS; SUBCUTANEOUS at 09:08

## 2022-08-26 RX ADMIN — PROPOFOL 20 MG: 10 INJECTION, EMULSION INTRAVENOUS at 10:08

## 2022-08-26 RX ADMIN — ACETAMINOPHEN 650 MG: 325 TABLET ORAL at 07:08

## 2022-08-26 RX ADMIN — FAMOTIDINE 20 MG: 10 INJECTION, SOLUTION INTRAVENOUS at 10:08

## 2022-08-26 RX ADMIN — CALCIUM ACETATE 667 MG: 667 CAPSULE ORAL at 11:08

## 2022-08-26 RX ADMIN — METOCLOPRAMIDE 5 MG: 5 TABLET ORAL at 11:08

## 2022-08-26 RX ADMIN — ONDANSETRON 4 MG: 2 INJECTION INTRAMUSCULAR; INTRAVENOUS at 09:08

## 2022-08-26 RX ADMIN — HYDRALAZINE HYDROCHLORIDE 50 MG: 25 TABLET ORAL at 09:08

## 2022-08-26 RX ADMIN — EPOETIN ALFA-EPBX 4400 UNITS: 10000 INJECTION, SOLUTION INTRAVENOUS; SUBCUTANEOUS at 02:08

## 2022-08-26 RX ADMIN — ASPIRIN 81 MG: 81 TABLET, COATED ORAL at 08:08

## 2022-08-26 RX ADMIN — ACETAMINOPHEN 650 MG: 325 TABLET ORAL at 06:08

## 2022-08-26 RX ADMIN — PROPOFOL 30 MG: 10 INJECTION, EMULSION INTRAVENOUS at 10:08

## 2022-08-26 RX ADMIN — MIDAZOLAM HYDROCHLORIDE 1 MG: 1 INJECTION, SOLUTION INTRAMUSCULAR; INTRAVENOUS at 10:08

## 2022-08-26 RX ADMIN — MIDAZOLAM HYDROCHLORIDE 0.5 MG: 1 INJECTION, SOLUTION INTRAMUSCULAR; INTRAVENOUS at 10:08

## 2022-08-26 RX ADMIN — CALCIUM ACETATE 667 MG: 667 CAPSULE ORAL at 08:08

## 2022-08-26 RX ADMIN — MEROPENEM AND SODIUM CHLORIDE 500 MG: 500 INJECTION, SOLUTION INTRAVENOUS at 08:08

## 2022-08-26 RX ADMIN — SODIUM CHLORIDE: 900 INJECTION, SOLUTION INTRAVENOUS at 09:08

## 2022-08-26 RX ADMIN — ATORVASTATIN CALCIUM 80 MG: 40 TABLET, FILM COATED ORAL at 08:08

## 2022-08-26 RX ADMIN — CALCIUM ACETATE 667 MG: 667 CAPSULE ORAL at 05:08

## 2022-08-26 RX ADMIN — MUPIROCIN 1 G: 20 OINTMENT TOPICAL at 08:08

## 2022-08-26 RX ADMIN — HEPARIN SODIUM 5000 UNITS: 5000 INJECTION INTRAVENOUS; SUBCUTANEOUS at 01:08

## 2022-08-26 RX ADMIN — METOCLOPRAMIDE 5 MG: 5 TABLET ORAL at 09:08

## 2022-08-26 RX ADMIN — ONDANSETRON 4 MG: 2 INJECTION INTRAMUSCULAR; INTRAVENOUS at 10:08

## 2022-08-26 RX ADMIN — HYDRALAZINE HYDROCHLORIDE 50 MG: 25 TABLET ORAL at 06:08

## 2022-08-26 RX ADMIN — CLOPIDOGREL BISULFATE 75 MG: 75 TABLET, FILM COATED ORAL at 08:08

## 2022-08-26 RX ADMIN — MUPIROCIN 1 G: 20 OINTMENT TOPICAL at 09:08

## 2022-08-26 RX ADMIN — FENTANYL CITRATE 50 MCG: 50 INJECTION INTRAMUSCULAR; INTRAVENOUS at 10:08

## 2022-08-26 RX ADMIN — METOCLOPRAMIDE 5 MG: 5 TABLET ORAL at 06:08

## 2022-08-26 RX ADMIN — CEFEPIME HYDROCHLORIDE 1 G: 1 INJECTION, SOLUTION INTRAVENOUS at 09:08

## 2022-08-26 NOTE — PROGRESS NOTES
"INPATIENT NEPHROLOGY Progress Note   [facility]    Patient Name: Leanna García  Date: 08/26/2022    Reason for consultation: ESRD    Chief Complaint:   Chief Complaint   Patient presents with    Wound Infection     Diabetic pt with a right foot wound that looks to be stage II with mild redness. Pt is concerned and wanted to get it looked at "before it got too bad". Pt currently is being seen by woundcare for her other foot, presented with a wound vac.      History of Present Illness:  Leanna García is a 57 y.o. female with a history as  has a past medical history of A-fib, Anemia due to end stage renal disease (6/30/2022), Arthritis, Bronchitis, Cardiovascular event risk, ASCVD 10-year risk 6.7% (10/15/2016), Diabetes mellitus type II, Diabetic foot infection, Diabetic ulcer of left midfoot (05/05/2022), Disorder of kidney and ureter, Encounter for blood transfusion, ESRD (end stage renal disease) (05/18/2018), MANJINDER (generalized anxiety disorder) (10/25/2016), History of colon polyps (11/02/2016), Hyperlipidemia, Hypertension, and Stroke. who presented to the ED with a Wound Infection (Diabetic pt with a right foot wound that looks to be stage II with mild redness. Pt is concerned and wanted to get it looked at "before it got too bad". Pt currently is being seen by woundcare for her other foot, presented with a wound vac. )     Patient presented to the emergency for evaluation of a right foot wound check. She reports reports while she was waiting she ate a salad. Further states once she got to the ED room, she was given IV ABX and was about to be discharged when she became nauseated and started vomiting. Per the ED, she was given IV zosyn with plans to discharge.  She was then given antiemetics for the NV, but was uncontrolled. Emesis looked like undigested food, so she was then treated with Reglan. ED provider also reports patient then complained of chest pain and abdominal pain.  " "Patient reports generalized CP 20/10 PRS as describes as "feel like i'm being beating up in chest." She further states the chest pain is going all over into my stomach. While at bedside, patient started to force herself to gagged and vomit. Will admit for symptom management with antiemetics. Consult Nephrology for ESRD. Consult Dr. Bruno for diabetic foot wound.        Lab and imaging obtained and reviewed.   CBC shows hemoglobin 11.5 MCH 26.5 MCHC 30.6 RDW 15.4 lymph% 17.7.  CMP shows sodium 135 BUN 54 creatinine 6.2 GFR 7.4 glucose 233 total protein 8.6.  Chest x-ray without acute cardiopulmonary findings. XR right foot shows Degenerative changes of the foot with no acute osseous abnormality observed.     CT abdomen pelvis  IMPRESSION:   1. Incidental parapelvic gallstones.  2. Stable perinephric stranding.  3. Questionable destructive changes of the L5-S1 disc space. If an occult infection is soft tissue consider MR imaging pre and postcontrast to assess this further. This is not changed however significantly since 5/4/2022     Interval History:  8/19- thinks she has food poisoning, Dr. Bruno looking at foot wounds  8/20- wound vac replacement today, discharge per hospitalist and podiatry  8/21- had debridement and wound vac placement yest- discharge per hospitalist and podiatry  8/22 VSS. HD today. Multiple nurses unable to needle he AVF today, clots coming out, good thrill and bruit. Was working OK on Friday. Will get US AVF and attempt again in AM. DO not discharge her.  8/23 VSS. AVF US shows nonocclusive clot - we will attempt to needle and run her today. If unsuccessful - will ask Vascular to see her. Add TID Lokelma for now, K is 5.    Addendum @ 10:43 AM Access not working, will consult Vascular.    8/24 VSS. Awaiting vascular intervention on clotted HD access. K is controlled. HD later today or when access is re-established.  8/25  Seen on dialysis, debbi well.  Scheduled for L gr toe removal tomorrow.  " VSS, Hgb 10.  8/26 VSS. S/p amputation 1st toe and partial 1st metatarsal head amputation left foot by Dr. Ponce on 8/25. Seen on HD today. Change Lokelma to daily. Plan for AVF repair on Monday next week per Dr. Khoobehi.    Plan of Care:    ESRD on HD MWF  HTN  Hyponatremia  Acidosis  Secondary HPT  Anemia of CKD  AVF with non-occlusive clot on AVF US 8/22, plan for aneurysmal repair and colateral therapy by Dr. Khoobehi next week    Plan:    - continue HD MWF  - continue home BP meds- adjust UF goal as needed  - renal diet, 1.5L fluid restriction, Lokelma daily for now  - adjust dialysate as needed  - continue binder with meals as tolerated  - continue FERMÍN with HDas needed    Thank you for allowing us to participate in this patient's care. We will continue to follow.    Vital Signs:  Temp Readings from Last 3 Encounters:   08/26/22 98.2 °F (36.8 °C) (Oral)   08/12/22 98 °F (36.7 °C)   07/29/22 96.7 °F (35.9 °C) (Axillary)       Pulse Readings from Last 3 Encounters:   08/26/22 67   08/12/22 66   07/29/22 75       BP Readings from Last 3 Encounters:   08/26/22 138/73   08/12/22 (!) 182/79   07/29/22 132/89       Weight:  Wt Readings from Last 3 Encounters:   08/21/22 85.7 kg (188 lb 15 oz)   08/07/22 87.3 kg (192 lb 7.4 oz)   07/24/22 78.9 kg (173 lb 14.4 oz)     Medications:  Scheduled Meds:   sodium chloride 0.9%   Intravenous Once    amLODIPine  10 mg Oral Daily    aspirin  81 mg Oral Daily    atorvastatin  80 mg Oral Daily    calcium acetate(phosphat bind)  667 mg Oral TID WM    clopidogreL  75 mg Oral Daily    epoetin chris-epbx  4,400 Units Subcutaneous Every Mon, Wed, Fri    heparin (porcine)  5,000 Units Subcutaneous Q8H    hydrALAZINE  50 mg Oral Q8H    insulin detemir U-100  15 Units Subcutaneous BID    losartan  100 mg Oral Daily    meropenem (MERREM) IVPB  500 mg Intravenous Daily    metoclopramide HCl  5 mg Oral QID (AC & HS)    mupirocin   Nasal BID    sodium zirconium cyclosilicate   10 g Oral BID    vancomycin (VANCOCIN) IVPB  1,500 mg Intravenous Once     Continuous Infusions:   alteplase       PRN Meds:.acetaminophen, alteplase, hydrALAZINE, insulin aspart U-100, melatonin, naloxone, ondansetron, oxyCODONE, senna-docusate 8.6-50 mg, sodium chloride 0.9%, sodium chloride 0.9%, sodium chloride 0.9%, Pharmacy to dose Vancomycin consult **AND** vancomycin - pharmacy to dose  No current facility-administered medications on file prior to encounter.     Current Outpatient Medications on File Prior to Encounter   Medication Sig Dispense Refill    amLODIPine (NORVASC) 10 MG tablet Take 1 tablet (10 mg total) by mouth once daily.      aspirin (ECOTRIN) 81 MG EC tablet Take 81 mg by mouth once daily.      atorvastatin (LIPITOR) 80 MG tablet Take 1 tablet (80 mg total) by mouth once daily. 90 tablet 3    azelastine (ASTELIN) 137 mcg (0.1 %) nasal spray 1 spray (137 mcg total) by Nasal route 2 (two) times a day. 30 mL 0    calcium acetate,phosphat bind, (PHOSLO) 667 mg capsule Take 1 capsule (667 mg total) by mouth 3 (three) times daily with meals.      cetirizine (ZYRTEC) 10 MG tablet Take 1 tablet (10 mg total) by mouth every other day.      clopidogreL (PLAVIX) 75 mg tablet Take 1 tablet (75 mg total) by mouth once daily. 30 tablet 0    epoetin chris-epbx (RETACRIT) 4,000 unit/mL injection Inject 1.11 mLs (4,440 Units total) into the skin every Mon, Wed, Fri.      fluticasone propionate (FLONASE) 50 mcg/actuation nasal spray 2 sprays (100 mcg total) by Each Nostril route once daily.      gabapentin (NEURONTIN) 100 MG capsule Take 1 capsule (100 mg total) by mouth 2 (two) times daily.      hydrALAZINE (APRESOLINE) 50 MG tablet Take 1 tablet (50 mg total) by mouth every 8 (eight) hours. 90 tablet 0    insulin aspart U-100 (NOVOLOG FLEXPEN U-100 INSULIN) 100 unit/mL (3 mL) InPn pen Inject 5-8 units 3 times per day with food. (Patient taking differently: Inject 5-8 Units into the skin 3  "(three) times daily with meals. Inject 5-8 units 3 times per day with food.) 1 Box 6    insulin detemir U-100 (LEVEMIR FLEXTOUCH U-100 INSULN) 100 unit/mL (3 mL) InPn pen Inject 15 Units into the skin every evening.      lactulose (CHRONULAC) 10 gram/15 mL solution Take 20 g by mouth 2 (two) times a day.      losartan (COZAAR) 100 MG tablet Take 1 tablet (100 mg total) by mouth once daily.      ondansetron (ZOFRAN-ODT) 8 MG TbDL Take 1 tablet (8 mg total) by mouth every 8 (eight) hours as needed (nausea).      oxyCODONE (ROXICODONE) 5 MG immediate release tablet Take 1 tablet (5 mg total) by mouth every 6 (six) hours as needed for Pain.      polyethylene glycol (GLYCOLAX) 17 gram PwPk Take 17 g by mouth 2 (two) times daily as needed.      senna-docusate 8.6-50 mg (PERICOLACE) 8.6-50 mg per tablet Take 1 tablet by mouth 2 (two) times daily.      blood sugar diagnostic Strp To check BG 4 times daily, to use with insurance preferred meter (Patient taking differently: 1 strip by Misc.(Non-Drug; Combo Route) route 4 (four) times daily. To check BG 4 times daily, to use with insurance preferred meter) 450 strip 3    pen needle, diabetic (BD ULTRA-FINE ROBERT PEN NEEDLE) 32 gauge x 5/32" Ndle To use 4 times per day with insulin injections. (Patient taking differently: 1 pen by Misc.(Non-Drug; Combo Route) route 4 (four) times daily. To use 4 times per day with insulin injections.) 450 each 2    [DISCONTINUED] blood-glucose meter (ACCU-CHEK KAYLIE PLUS METER) Misc To use to check blood sugars 4 times a day 1 each 0    [DISCONTINUED] clorazepate (TRANXENE) 3.75 MG Tab Take 7.5 mg by mouth nightly as needed.       [DISCONTINUED] dextrose (GLUCOSE GEL) 40 % gel Take 37.5 mLs (15,000 mg total) by mouth once as needed (hypoglycemia). 37.5 g 4    [DISCONTINUED] ezetimibe (ZETIA) 10 mg tablet Take 1 tablet (10 mg total) by mouth once daily.      [DISCONTINUED] fenofibrate 160 MG Tab Take 1 tablet (160 mg total) by mouth " once daily. 90 tablet 3    [DISCONTINUED] lancing device with lancets (ACCU-CHEK SOFT DEV LANCETS) Kit To check blood sugars 4 times per day 400 each 3    [DISCONTINUED] pantoprazole (PROTONIX) 40 MG tablet Take 1 tablet (40 mg total) by mouth once daily.         Review of Systems:  Neg    Physical Exam:  Constitutional: nad, aao x 3  Heart: rrr, no m/r/g, no edema  Lungs: ctab, no w/r/r/c, no lb  Abdomen: s/nt/nd, +BS    Results:  Recent Labs   Lab 08/24/22  0607 08/25/22  0921 08/26/22  0640   * 135* 134*   K 5.1 5.2* 4.8   CL 98 98 98   CO2 23 23 23   * 107* 53*   CREATININE 10.0* 10.9* 6.8*   * 223* 100       Recent Labs   Lab 08/24/22  0607 08/25/22  0921 08/26/22  0640   CALCIUM 9.9 9.9 9.8   ALBUMIN 3.2* 3.4* 3.5   PHOS 5.0* 5.0* 4.0   MG 2.3 2.2 2.2       Recent Labs   Lab 01/30/20  0705 03/10/22  0650   PTH, Intact 466.0 H 387.0 H       No results for input(s): POCTGLUCOSE in the last 168 hours.    Recent Labs   Lab 05/05/22  0320 05/29/22  1324 06/22/22  0618   Hemoglobin A1C 10.6 H 8.3 H 7.5 H       Recent Labs   Lab 08/24/22  0607 08/25/22  0922 08/26/22  0640   WBC 10.33 10.25 9.17   HGB 10.3* 10.5* 11.0*   HCT 33.1* 33.8* 36.4*    295 276   MCV 86 86 88   MCHC 31.1* 31.1* 30.2*   MONO 8.7  0.9 10.1  1.0 12.2  1.1*       Recent Labs   Lab 08/24/22  0607 08/25/22  0921 08/26/22  0640   BILITOT 0.7 0.7 0.6   PROT 7.2 7.3 7.7   ALBUMIN 3.2* 3.4* 3.5   ALKPHOS 72 67 73   ALT 15 12 9*   AST 13 16 17       Recent Labs   Lab 05/29/22  0900 06/21/22  1612   Color, UA Yellow Yellow   Appearance, UA Clear Clear   pH, UA 8.0 >8.0 A   Specific [place], UA 1.015 1.010   Protein, UA 2+ A 3+ A   Glucose, UA 2+ A 2+ A   Ketones, UA Negative Negative   Urobilinogen, UA Negative Negative   Bilirubin (UA) Negative Negative   Occult Blood UA Negative Negative   Nitrite, UA Negative Negative   RBC, UA 1 1   WBC, UA 5 2   Bacteria Rare Negative   Hyaline Casts, UA 0 12 A       Recent Labs    Lab 05/19/21  0300   POC PH 7.273 L   POC PCO2 47.8 H   POC HCO3 22.1 L   POC PO2 22 LL   POC SATURATED O2 30 L   POC BE -5   Sample VENOUS     I have spent > minutes providing care for this patient for the above diagnoses. These services have included chart/data/imaging review, evaluation, exam, formulation of plan, , note preparation, and discussions with staff involved in this patient's care.    Bryce Craig MD    [facility] [address] [address]  [phone] (p)  [phone] (f)

## 2022-08-26 NOTE — PROGRESS NOTES
HD: consent and hepatitis status confirmed prior to HD, pt stable, tx completed, pt educated on infection prevention, dressing changed. Lines flushed, clamped,and capped. NET UF: 500mls. Report called to Amalia.

## 2022-08-26 NOTE — PROGRESS NOTES
[facility]  Progress Note    Patient name: Leanna García  MRN: [id number]  Admit Date: 8/18/2022   LOS: 6 days     SUBJECTIVE:     Principal problem: Diabetic ulcer of right midfoot associated with type 2 diabetes mellitus    Interval History:  No acute overnight events reported.  Seen after returning from OR after undergoing amputation of 1st toe and partial 1st metatarsal head amputation left foot.  Wound cultures drawn on 08/24 growing Proteus mirabilis - pansensitive.    Scheduled Meds:   sodium chloride 0.9%   Intravenous Once    amLODIPine  10 mg Oral Daily    aspirin  81 mg Oral Daily    atorvastatin  80 mg Oral Daily    calcium acetate(phosphat bind)  667 mg Oral TID WM    ceFEPime (MAXIPIME) IVPB  1 g Intravenous Q24H    clopidogreL  75 mg Oral Daily    epoetin chris-epbx  4,400 Units Subcutaneous Every Mon, Wed, Fri    heparin (porcine)  5,000 Units Subcutaneous Q8H    hydrALAZINE  50 mg Oral Q8H    insulin detemir U-100  15 Units Subcutaneous BID    losartan  100 mg Oral Daily    metoclopramide HCl  5 mg Oral QID (AC & HS)    mupirocin   Nasal BID    [START ON 8/27/2022] sodium zirconium cyclosilicate  10 g Oral Daily     Continuous Infusions:   alteplase       PRN Meds:acetaminophen, alteplase, heparin (porcine), hydrALAZINE, insulin aspart U-100, melatonin, naloxone, ondansetron, oxyCODONE, senna-docusate 8.6-50 mg, sodium chloride 0.9%, sodium chloride 0.9%, sodium chloride 0.9%    Review of patient's allergies indicates:   Allergen Reactions    Dilaudid [hydromorphone] Nausea And Vomiting    Chlorhexidine Itching    Lortab [hydrocodone-acetaminophen] Itching       Review of Systems: As per interval history    OBJECTIVE:     Vital Signs (Most Recent)  Temp: 98.4 °F (36.9 °C) (08/26/22 1410)  Pulse: 69 (08/26/22 1600)  Resp: 18 (08/26/22 1410)  BP: 112/61 (08/26/22 1600)  SpO2: 97 % (08/26/22 1410)    Vital Signs Range (Last 24H):  Temp:  [97.5 °F  (36.4 °C)-98.4 °F (36.9 °C)]   Pulse:  [65-78]   Resp:  [11-18]   BP: ()/(47-80)   SpO2:  [94 %-100 %]     I & O (Last 24H):    Intake/Output Summary (Last 24 hours) at 8/26/2022 1627  Last data filed at 8/26/2022 1130  Gross per 24 hour   Intake 180 ml   Output 10 ml   Net 170 ml       Physical Exam:  General: Patient resting comfortably in no acute distress. Appears as stated age. Calm  Eyes: No conjunctival injection. No scleral icterus.  ENT: Hearing grossly intact. No discharge from ears. No nasal discharge.   CVS: RRR. No LE edema BL  Lungs:  No tachypnea or accessory muscle use.  Clear to auscultation bilaterally  Abdomen:  Soft, nontender and nondistended.  No organomegaly  Neuro:  Alert.  Follows commands.  Skin:  Left foot dressing - clean, dry and intact.  Left upper extremity AV fistula with thrill    Laboratory:  I have reviewed all pertinent lab results within the past 24 hours.  CBC:   Recent Labs   Lab 08/26/22  0640   WBC 9.17   RBC 4.14   HGB 11.0*   HCT 36.4*      MCV 88   MCH 26.6*   MCHC 30.2*     BMP:   Recent Labs   Lab 08/26/22  0640      *   K 4.8   CL 98   CO2 23   BUN 53*   CREATININE 6.8*   CALCIUM 9.8   MG 2.2       Diagnostic Results:  Labs: Reviewed     ASSESSMENT/PLAN:     Active Hospital Problems    Diagnosis  POA    *Diabetic ulcer of right midfoot associated with type 2 diabetes mellitus [E11.621, L97.419]  Yes    Acute hematogenous osteomyelitis of left foot [M86.072]  Yes    Gastroparesis [K31.84]  Yes    Slow transit constipation [K59.01]  Yes    Ulcer of left foot with necrosis of muscle [L97.523]  Yes    PAD (peripheral artery disease) [I73.9]  Yes    Type 2 diabetes mellitus with kidney complication, with long-term current use of insulin [E11.29, Z79.4]  Not Applicable    ESRD (end stage renal disease) [N18.6]  Yes    Hyperlipemia [E78.5]  Yes    Hypertension associated with diabetes [E11.59, I15.2]  Yes     Chronic      Resolved Hospital  Problems    Diagnosis Date Resolved POA    Chest pain [R07.9] 08/19/2022 Yes         Plan:   Diabetic left foot ulcer with muscle necrosis - recently discharged from LTAC after receiving about 10 weeks of antibiotics over the course of last 3 months  Status post bedside excisional debridement of left midfoot on 8/19  MRI of the left forefoot revealed acute osteomyelitis involving great toe proximal and distal phalanges  Status post bone biopsy of the left hallux, excisional debridement ulceration of the left hallux and superficial debridement of the plantar foot on 08/24  Status post amputation 1st toe and partial 1st metatarsal head amputation left foot on 08/26   Cultures growing pansensitive Proteus mirabilis  Antibiotics de-escalated to cefepime.  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 Khoobehi     ESRD on hemodialysis  Chronic anemia ESRD  Nephrology following for dialysis needs  Scheduled potassium binders    Poorly-controlled type 2 DM complicated by foot ulcers  Type 2 DM.  Basal insulin along with low-dose insulin sliding scale.  Increase detemir to 15 units b.i.d.      VTE Risk Mitigation (From admission, onward)         Ordered     heparin (porcine) injection 4,000 Units  As needed (PRN)         08/26/22 1426     heparin (porcine) injection 5,000 Units  Every 8 hours         08/18/22 2311     IP VTE HIGH RISK PATIENT  Once         08/18/22 2311     Place sequential compression device  Until discontinued         08/18/22 2311                    Department Hospital Medicine  [facility]  Andrea Montoya MD  Date of service: 08/26/2022

## 2022-08-26 NOTE — CARE UPDATE
08/26/22 1344   Patient Assessment/Suction   Level of Consciousness (AVPU) alert   Respiratory Effort Normal;Unlabored   Expansion/Accessory Muscles/Retractions expansion symmetric   All Lung Fields Breath Sounds clear   Rhythm/Pattern, Respiratory unlabored   PRE-TX-O2   O2 Device (Oxygen Therapy) room air   SpO2 100 %   Pulse Oximetry Type Intermittent   $ Pulse Oximetry - Multiple Charge Pulse Oximetry - Multiple   Pulse 77   Resp 18   Incentive Spirometer   $ Incentive Spirometer Charges postop instruction;done independently per patient;ready for self-administration;proper technique demonstrated   Administration (IS) instruction provided, initial;proper technique demonstrated;self-administered   Number of Repetitions (IS) 10   Level Incentive Spirometer (mL) 1500   Patient Tolerance (IS) good   Education   $ Education Other (see comment);15 min   Respiratory Evaluation   $ Care Plan Tech Time 15 min

## 2022-08-26 NOTE — PLAN OF CARE
Problem: Adult Inpatient Plan of Care  Goal: Plan of Care Review  Outcome: Ongoing, Progressing  Goal: Patient-Specific Goal (Individualized)  Outcome: Ongoing, Progressing  Goal: Absence of Hospital-Acquired Illness or Injury  Outcome: Ongoing, Progressing  Goal: Optimal Comfort and Wellbeing  Outcome: Ongoing, Progressing  Goal: Readiness for Transition of Care  Outcome: Ongoing, Progressing     Problem: Diabetes Comorbidity  Goal: Blood Glucose Level Within Targeted Range  Outcome: Ongoing, Progressing     Problem: Infection  Goal: Absence of Infection Signs and Symptoms  Outcome: Ongoing, Progressing     Problem: Impaired Wound Healing  Goal: Optimal Wound Healing  Outcome: Ongoing, Progressing     Problem: Skin Injury Risk Increased  Goal: Skin Health and Integrity  Outcome: Ongoing, Progressing     Problem: Device-Related Complication Risk (Hemodialysis)  Goal: Safe, Effective Therapy Delivery  Outcome: Ongoing, Progressing     Problem: Hemodynamic Instability (Hemodialysis)  Goal: Effective Tissue Perfusion  Outcome: Ongoing, Progressing     Problem: Infection (Hemodialysis)  Goal: Absence of Infection Signs and Symptoms  Outcome: Ongoing, Progressing     Problem: Oral Intake Inadequate  Goal: Improved Oral Intake  Outcome: Ongoing, Progressing

## 2022-08-26 NOTE — ANESTHESIA PREPROCEDURE EVALUATION
08/26/2022  Leanna García is a 57 y.o., female.      Pre-op Assessment    I have reviewed the Patient Summary Reports.     I have reviewed the Nursing Notes. I have reviewed the NPO Status.   I have reviewed the Medications.     Review of Systems  Anesthesia Hx:  Denies Family Hx of Anesthesia complications.   Denies Personal Hx of Anesthesia complications.   Social:  Non-Smoker, No Alcohol Use    Hematology/Oncology:     Oncology Normal    -- Anemia:   EENT/Dental:EENT/Dental Normal   Cardiovascular:   Hypertension Dysrhythmias atrial fibrillation hyperlipidemia 05/06/2022 echo shows 65% EF, normal diastolic function, mild MR   Pulmonary:  Pulmonary Normal    Renal/:   Chronic Renal Disease ( last dialysis 05/11/2022), ESRD, Dialysis    Hepatic/GI:   Recent food poisoning, resolved   Musculoskeletal:   Arthritis     Neurological:   TIA, Occasional left upper extremity tremor  Peripheral Neuropathy ( feet)    Endocrine:   Diabetes (Glucose 153 this morning), poorly controlled, using insulin    Psych:   anxiety          Physical Exam  General: Well nourished and Alert    Airway:  Mallampati: III / II  Mouth Opening: Normal  TM Distance: Normal  Tongue: Normal  Neck ROM: Normal ROM    Dental:  Intact  Multiple missing.  Chest/Lungs:  Clear to auscultation, Normal Respiratory Rate    Heart:  Rate: Normal  Rhythm: Regular Rhythm  Sounds: Normal  Murmur: Systolic;        Anesthesia Plan  Type of Anesthesia, risks & benefits discussed:    Anesthesia Type: MAC  Intra-op Monitoring Plan: Standard ASA Monitors  Post Op Pain Control Plan: multimodal analgesia  Induction:  IV  Airway Plan: Direct, Post-Induction  Informed Consent: Informed consent signed with the Patient and all parties understand the risks and agree with anesthesia plan.  All questions answered. Patient consented to blood products? Yes  ASA  Score: 3  Anesthesia Plan Notes: No Decadron, No tylenol (took it already)  Antiemetics:  Zofran, Pepcid  Patient received acetaminophen 650 mg at 0630 08/26/2022    Ready For Surgery From Anesthesia Perspective.     .

## 2022-08-26 NOTE — PROGRESS NOTES
[facility]  Adult Nutrition   Progress Note (Follow-Up)    SUMMARY      Recommendations:   1. Advance diet as tolerated to Diabetic 1800 kcal, Renal.   2. Continue Nepro with meals and Wilman TID for 14 days.     Goals:   Goals: 1. Intake to be >/= 75% estimated needs for kcal and protein. 2. Lab values trend to target range.  Progressing    Dietitian Rounds Brief  Patient NPO for left great toe amputation today. Patient intake prior to NPO status was good. Continue Nepro and Wilman per MD order.     Diet order: NPO    Oral Supplement: on hold    % Intake of Estimated Energy Needs: 0%  % Meal Intake: NPO    Estimated/Assessed Needs  Weight Used For Calorie Calculations: 85.7 kg (188 lb 15 oz)  Energy Calorie Requirements (kcal): 5132-7465 kcal/day (20-25 kcal/kg)     Protein Requirements: 103-129 gm/day (1.2-1.5 gm/kg)  Weight Used For Protein Calculations: 85.7 kg (188 lb 15 oz)     Estimated Fluid Requirement Method: RDA Method  RDA Method (mL): 1714       Weight History:  Wt Readings from Last 5 Encounters:   08/21/22 85.7 kg (188 lb 15 oz)   08/07/22 87.3 kg (192 lb 7.4 oz)   07/24/22 78.9 kg (173 lb 14.4 oz)   06/21/22 89.1 kg (196 lb 6.9 oz)   06/13/22 89.9 kg (198 lb 3.1 oz)        Reason for Assessment  Reason For Assessment: length of stay  Diagnosis: infection/sepsis, other (see comments) (diabetic ulcer)  Relevant Medical History: ESRD, DM2, gastroparesis    Medications:Pertinent Medications Reviewed  Scheduled Meds:   sodium chloride 0.9%   Intravenous Once    amLODIPine  10 mg Oral Daily    aspirin  81 mg Oral Daily    atorvastatin  80 mg Oral Daily    calcium acetate(phosphat bind)  667 mg Oral TID WM    clopidogreL  75 mg Oral Daily    epoetin chris-epbx  4,400 Units Subcutaneous Every Mon, Wed, Fri    heparin (porcine)  5,000 Units Subcutaneous Q8H    hydrALAZINE  50 mg Oral Q8H    insulin detemir U-100  15 Units Subcutaneous BID    losartan  100 mg Oral Daily    meropenem  (MERREM) IVPB  500 mg Intravenous Daily    metoclopramide HCl  5 mg Oral QID (AC & HS)    mupirocin   Nasal BID    sodium zirconium cyclosilicate  10 g Oral BID     Continuous Infusions:   alteplase       PRN Meds:.acetaminophen, alteplase, hydrALAZINE, insulin aspart U-100, melatonin, naloxone, ondansetron, oxyCODONE, senna-docusate 8.6-50 mg, sodium chloride 0.9%, sodium chloride 0.9%, Pharmacy to dose Vancomycin consult **AND** vancomycin - pharmacy to dose    Labs: Pertinent Labs Reviewed  Clinical Chemistry:  Recent Labs   Lab 08/24/22  0607 08/25/22  0921 08/26/22  0640   * 135* 134*   K 5.1 5.2* 4.8   CL 98 98 98   CO2 23 23 23   * 223* 100   * 107* 53*   CREATININE 10.0* 10.9* 6.8*   CALCIUM 9.9 9.9 9.8   PROT 7.2 7.3 7.7   ALBUMIN 3.2* 3.4* 3.5   BILITOT 0.7 0.7 0.6   ALKPHOS 72 67 73   AST 13 16 17   ALT 15 12 9*   ANIONGAP 13 14 13   MG 2.3 2.2 2.2   PHOS 5.0* 5.0* 4.0     CBC:   Recent Labs   Lab 08/26/22  0640   WBC 9.17   RBC 4.14   HGB 11.0*   HCT 36.4*      MCV 88   MCH 26.6*   MCHC 30.2*     Monitor and Evaluation  Food and Nutrient Intake: energy intake, food and beverage intake  Food and Nutrient Adminstration: diet order  Knowledge/Beliefs/Attitudes: food and nutrition knowledge/skill  Physical Activity and Function: nutrition-related ADLs and IADLs  Anthropometric Measurements: weight, weight change, body mass index  Biochemical Data, Medical Tests and Procedures: electrolyte and renal panel, inflammatory profile, gastrointestinal profile, lipid profile, glucose/endocrine profile  Nutrition-Focused Physical Findings: overall appearance     Nutrition Risk  Level of Risk/Frequency of Follow-up: moderate - high     Nutrition Follow-Up  RD Follow-up?: Yes    Sonia hCristiansen RD 08/26/2022 9:46 AM

## 2022-08-26 NOTE — OP NOTE
Operative Report     Patient name: Leanna García   MRN: [id number]  Date of surgery: 8/26/2022    Surgeon: Porfirio Ponce DPM   Assistant:  None    Preoperative diagnosis:  Osteomyelitis and grade 3 ulcer left 1st toe and peripheral vascular disease  Postoperative diagnosis:  Same as the above  Procedure:  Amputation 1st toe and partial 1st metatarsal head amputation left foot  Anesthesia:  IV sedation monitored anesthesia care local anesthetic block of left foot with 0.5% plain Marcaine 1% xylocaine plain  Hemostasis:  No tourniquet  Estimated blood loss:  10 cc   Specimen:  1st toe and 1st metatarsal head left foot  Complications: None  Condition upon discharge: Stable              Procedure in detail:  Patient brought the operating room placed on the table supine position time-out called.  IV sedation performed local anesthetic block of the left 1st metatarsal 1st metatarsophalangeal joint 1st toe done with 0.5% plain Marcaine 1% plain xylocaine.  Usual aseptic prep and drape was performed.  Two semielliptical converging incisions were made encompassing the ulcer and the 1st toe at the MPJ.  I disarticulated at sent the toe out for histopathology.  Skin closure was going to be too tight without taking the metatarsal heads I removed the 1st metatarsal head.  No purulent drainage noted.  I irrigated the area coagulated bleeders.  There was a substantial amount of oozing secondary to the anticoagulation therapy.  Was able to control the major bleeding.  I placed pressure into the wound for few minutes were slow down the oozing.  I closed the deep structures with 3-0 Vicryl skin with simple interrupted 4-0 Prolene sutures.  I dressed the area with Adaptic 4x4s mild compression dressing with a Duncan and Ace wrap.  There was no bleed through.  Patient tolerated procedures well left the operating room with stable vitals.    1. Keep dressings, clean, dry, and intact to surgical extremity.  2. Rest, ice, and elevate  the surgical extremity.  3. Surgical shoe to surgical extremity in operating room  4. Patient is inpatient she will return to her inpatient room continue antibiotics and medical care by hospitalist and Infectious Disease.  Wound care will continue for the arch ulcer.

## 2022-08-26 NOTE — PROGRESS NOTES
1150- arrived to patient's room, family @ bedside, patient without complaints, call light in reach

## 2022-08-26 NOTE — TRANSFER OF CARE
"Anesthesia Transfer of Care Note    Patient: Leanna García    Procedure(s) Performed: Procedure(s) (LRB):  AMPUTATION, TOE (Left)    Patient location: PACU    Anesthesia Type: MAC    Transport from OR: Transported from OR on 2-3 L/min O2 by NC with adequate spontaneous ventilation    Post pain: adequate analgesia    Post assessment: no apparent anesthetic complications and tolerated procedure well    Post vital signs: stable    Level of consciousness: awake, oriented and responds to stimulation    Nausea/Vomiting: no nausea/vomiting    Complications: none    Transfer of care protocol was followed      Last vitals:   Visit Vitals  BP (!) 159/70 (BP Location: Right arm, Patient Position: Lying)   Pulse 72   Temp 36.9 °C (98.4 °F) (Temporal)   Resp 14   Ht 5' 9" (1.753 m)   Wt 85.7 kg (188 lb 15 oz)   SpO2 100%   BMI 27.90 kg/m²     "

## 2022-08-26 NOTE — PROGRESS NOTES
"VANCOMYCIN PHARMACOKINETIC NOTE:  Vancomycin Day #6    Objective:    57 y.o., female, Actual Body Weight = 85.7 kg (188 lb 15 oz)    Diagnosis/Indication for Vancomycin:  Skin & Soft Tissue Infection   Desired Vancomycin Peak:  30-35 mcg/ml; Desired Trough: 10-15 mcg/ml    Current Vancomycin Regimen:  1500 mg IV intermittent    Current Dosing History   1 8/20 83.0 03:07 4.9 14.6 VANCOMYCIN initiated @  Intermittent Dosing         15:00 14:50 1 1500     2 8/21 85.7 03:31 6.8 10.7 14:00 14:23 - - 25.7 Random   3 8/22 85.7 04:55 7.9 9.2 HOLD VANCOMYCIN            VANCOMYCIN D/C'd @ 13:01 - Adamaris   4 8/24 85.7   7.3 Restart vancomycin on 8/25                19:41 - - 18.2 Random   5 8/25 85.7 09:21 10.9 6.7         6 8/26 85.7 06:40 6.8 10.7 AM 06:40 - - 12.9 Random     Receiving other antibiotics:    Antibiotics (From admission, onward)                Start     Stop Route Frequency Ordered    08/26/22 2100  vancomycin (VANCOCIN) 1,500 mg in dextrose 5 % 500 mL IVPB         -- IV Once 08/26/22 1205    08/25/22 1511  meropenem-0.9% sodium chloride 500 mg/50 mL IVPB         -- IV Daily 08/25/22 1511    08/25/22 1015  mupirocin 2 % ointment         08/30 0859 Nasl 2 times daily 08/25/22 0908    08/24/22 1837  vancomycin - pharmacy to dose  (vancomycin IVPB)        "And" Linked Group Details    -- IV pharmacy to manage frequency 08/24/22 2951             The patient has the following labs:     8/26/2022 Estimated Creatinine Clearance: 10.7 mL/min (A) (based on SCr of 6.8 mg/dL (H)). Lab Results   Component Value Date    BUN 53 (H) 08/26/2022     Lab Results   Component Value Date    WBC 9.17 08/26/2022        Patient receives hemodialysis intermittently    Random vancomycin:  12.9 mcg/mL collected 4 days 16 hrs after dose #1    Microbiology Results (last 7 days)       Procedure Component Value Units Date/Time    Tissue culture [[id number]]  (Abnormal) Collected: 08/24/22 1310    Order Status: Completed Specimen: Tissue " Updated: 08/26/22 0931     Aerobic Culture - Tissue PROTEUS MIRABILIS  Moderate  For susceptibility see order #[id number]       Gram Stain Result No WBC's      No organisms seen    Narrative:      SOFT TISSUE, LEFT GREAT TOE    Tissue culture [[id number]]  (Abnormal) Collected: 08/24/22 1310    Order Status: Completed Specimen: Bone Updated: 08/26/22 0859     Aerobic Culture - Tissue PROTEUS MIRABILIS  For susceptibility see order #[id number]       Gram Stain Result Rare WBC's      Rare Gram negative rods    Narrative:      left great toe    Aerobic culture [[id number]]  (Abnormal)  (Susceptibility) Collected: 08/24/22 1310    Order Status: Completed Specimen: Wound from Toe, Left Foot Updated: 08/26/22 0858     Aerobic Bacterial Culture PROTEUS MIRABILIS  Moderate      Narrative:      Left great toe    Gram stain [[id number]] Collected: 08/24/22 1310    Order Status: Completed Specimen: Wound from Toe, Left Foot Updated: 08/24/22 2014     Gram Stain Result Rare WBC's      Few Gram negative rods      Rare Gram positive cocci    Narrative:      Left great toe    Fungus culture [[id number]] Collected: 08/24/22 1310    Order Status: Sent Specimen: Wound from Toe, Left Foot Updated: 08/24/22 1348    AFB Culture & Smear [[id number]] Collected: 08/24/22 1310    Order Status: Sent Specimen: Wound from Toe, Left Foot Updated: 08/24/22 1348    Culture, Anaerobic [[id number]] Collected: 08/24/22 1310    Order Status: Sent Specimen: Wound from Toe, Left Foot Updated: 08/24/22 1348    Fungus culture [[id number]] Collected: 08/24/22 1310    Order Status: Sent Specimen: Wound from Toe, Left Foot Updated: 08/24/22 1347    AFB Culture & Smear [[id number]] Collected: 08/24/22 1310    Order Status: Sent Specimen: Wound from Toe, Left Foot Updated: 08/24/22 1347    Culture, Anaerobic [[id number]] Collected: 08/24/22 1310    Order Status: Sent Specimen: Wound from Toe, Left Foot Updated: 08/24/22 1347    Fungus culture [[id number]] Collected:  08/24/22 1310    Order Status: Sent Specimen: Wound from Toe, Left Foot Updated: 08/24/22 1345    AFB Culture & Smear [[id number]] Collected: 08/24/22 1310    Order Status: Sent Specimen: Wound from Toe, Left Foot Updated: 08/24/22 1344    Culture, Anaerobic [[id number]] Collected: 08/24/22 1310    Order Status: Sent Specimen: Wound from Toe, Left Foot Updated: 08/24/22 1344    Aerobic culture [[id number]] Collected: 08/24/22 1310    Order Status: Canceled Specimen: Bone from Toe, Left Foot     Gram stain [[id number]] Collected: 08/24/22 1310    Order Status: Canceled Specimen: Wound from Toe, Left Foot     Aerobic culture [[id number]] Collected: 08/24/22 1310    Order Status: Canceled Specimen: Tissue from Toe, Left Foot     Gram stain [[id number]] Collected: 08/24/22 1310    Order Status: Canceled Specimen: Wound from Toe, Left Foot              Assessment:  Vancomycin dose =  17.6 mg/kg  Renal function is: dependent on dialysis.  Vancomycin elimination is dependent on dialysis.  Vancomycin half-life is prolonged with low renal function.  Serum vancomycin levels will decrease slowly.  Vancomycin trough is within goal range.    Plan:  No change to current vancomycin regimen  Will schedule next dose for 8/26/22 at 2100  Will obtain random vancomycin level 8/29/22 at/with AM Labs    Pharmacy will continue to manage vancomycin therapy and adjust regimen as necessary.    Thank you for allowing us to participate in this patient's care.     Suellen Rhodes 8/26/2022 12:06 PM  Department of Pharmacy  Ext [phone]

## 2022-08-26 NOTE — PROGRESS NOTES
ID Consult    Reason: left great toe wound    INTERVAL HISTORY:  8.22: Toe xray was worrisome for osteomyelitis and MRI confirmed it. Dr. Ponce willing to do a bone biopsy.   8/23: plans for bone biopsy noted. Wound care notes and images reviewed.   8/24:  Status post bone biopsy with findings of soft, osteomyelitic bone and an open joint.  Dr. Ponce recommends amputation of the toe and I discussed this with the patient and her  and they are in agreement.  Vascular notes reviewed with placement of a new tunneled dialysis catheter and anticipation of a revision of her left arm AV fistula.  I do not know when this is scheduled.  8/25: interim reviewed. Tissue cultures from left great toe are growing a GNR, to be identified, like a proteus. She had some vomiting this morning after taking a pain pill on an empty stomach. She is interested in resuming physical therapy.   8/26: Interim reviewed status post amputation of the left great toe today.  Bone biopsy from 08/24 has Proteus, sensitive to all the drugs tested. Seen in dialysis.     EXAM & DIAGNOSTICS REVIEWED:   Vitals:     Temp:  [97.5 °F (36.4 °C)-98.4 °F (36.9 °C)]   Temp: 98.4 °F (36.9 °C) (08/26/22 1410)  Pulse: 65 (08/26/22 1430)  Resp: 18 (08/26/22 1410)  BP: (!) 122/57 (08/26/22 1430)  SpO2: 97 % (08/26/22 1410)    Intake/Output Summary (Last 24 hours) at 8/26/2022 1508  Last data filed at 8/26/2022 1130  Gross per 24 hour   Intake 180 ml   Output 10 ml   Net 170 ml            8/20 7/25 8/21  As best I can recall, her distal great toe has been erythematous but it is more so now  8/22: wound vac not disturbed. Tearful about left arm graft clotting  8/23 small bone is visible and palpable      8/24:  Bandage is not disturbed  8/25: bandage not disturbed. The right sided tunneled cath is bruised and puffy. Left arm remains swollen.      General Labs reviewed:  Recent Labs   Lab 08/24/22  0607 08/25/22  0922 08/26/22  0640   WBC 10.33 10.25  9.17   HGB 10.3* 10.5* 11.0*   HCT 33.1* 33.8* 36.4*    295 276       Recent Labs   Lab 08/24/22  0607 08/25/22  0921 08/26/22  0640   * 135* 134*   K 5.1 5.2* 4.8   CL 98 98 98   CO2 23 23 23   * 107* 53*   CREATININE 10.0* 10.9* 6.8*   CALCIUM 9.9 9.9 9.8   PROT 7.2 7.3 7.7   BILITOT 0.7 0.7 0.6   ALKPHOS 72 67 73   ALT 15 12 9*   AST 13 16 17     No results for input(s): CRP in the last 168 hours.      Micro:  Microbiology Results (last 7 days)     Procedure Component Value Units Date/Time    Tissue culture [[id number]]  (Abnormal) Collected: 08/24/22 1310    Order Status: Completed Specimen: Bone Updated: 08/26/22 1407     Aerobic Culture - Tissue PROTEUS MIRABILIS  Moderate  For susceptibility see order #[id number]       Gram Stain Result Rare WBC's      Rare Gram negative rods    Narrative:      left great toe    Tissue culture [[id number]]  (Abnormal) Collected: 08/24/22 1310    Order Status: Completed Specimen: Tissue Updated: 08/26/22 0931     Aerobic Culture - Tissue PROTEUS MIRABILIS  Moderate  For susceptibility see order #[id number]       Gram Stain Result No WBC's      No organisms seen    Narrative:      SOFT TISSUE, LEFT GREAT TOE    Aerobic culture [[id number]]  (Abnormal)  (Susceptibility) Collected: 08/24/22 1310    Order Status: Completed Specimen: Wound from Toe, Left Foot Updated: 08/26/22 0858     Aerobic Bacterial Culture PROTEUS MIRABILIS  Moderate      Narrative:      Left great toe    Gram stain [[id number]] Collected: 08/24/22 1310    Order Status: Completed Specimen: Wound from Toe, Left Foot Updated: 08/24/22 2014     Gram Stain Result Rare WBC's      Few Gram negative rods      Rare Gram positive cocci    Narrative:      Left great toe    Fungus culture [[id number]] Collected: 08/24/22 1310    Order Status: Sent Specimen: Wound from Toe, Left Foot Updated: 08/24/22 1348    AFB Culture & Smear [[id number]] Collected: 08/24/22 1310    Order Status: Sent Specimen: Wound from  Toe, Left Foot Updated: 08/24/22 1348    Culture, Anaerobic [[id number]] Collected: 08/24/22 1310    Order Status: Sent Specimen: Wound from Toe, Left Foot Updated: 08/24/22 1348    Fungus culture [[id number]] Collected: 08/24/22 1310    Order Status: Sent Specimen: Wound from Toe, Left Foot Updated: 08/24/22 1347    AFB Culture & Smear [[id number]] Collected: 08/24/22 1310    Order Status: Sent Specimen: Wound from Toe, Left Foot Updated: 08/24/22 1347    Culture, Anaerobic [[id number]] Collected: 08/24/22 1310    Order Status: Sent Specimen: Wound from Toe, Left Foot Updated: 08/24/22 1347    Fungus culture [[id number]] Collected: 08/24/22 1310    Order Status: Sent Specimen: Wound from Toe, Left Foot Updated: 08/24/22 1345    AFB Culture & Smear [[id number]] Collected: 08/24/22 1310    Order Status: Sent Specimen: Wound from Toe, Left Foot Updated: 08/24/22 1344    Culture, Anaerobic [[id number]] Collected: 08/24/22 1310    Order Status: Sent Specimen: Wound from Toe, Left Foot Updated: 08/24/22 1344    Aerobic culture [[id number]] Collected: 08/24/22 1310    Order Status: Canceled Specimen: Bone from Toe, Left Foot     Gram stain [[id number]] Collected: 08/24/22 1310    Order Status: Canceled Specimen: Wound from Toe, Left Foot     Aerobic culture [[id number]] Collected: 08/24/22 1310    Order Status: Canceled Specimen: Tissue from Toe, Left Foot     Gram stain [[id number]] Collected: 08/24/22 1310    Order Status: Canceled Specimen: Wound from Toe, Left Foot           Imaging Reviewed:    foot xray:  Lucency at the level of the proximal aspect of the distal great toe that is similar in character as compared to previous attributed towards the flexion position of extremity digit though less conspicuous for osteomyelitis. Would advise repeat assessment with MRI of the forefoot region with contrast for optimal evaluation.    MRI of left forefoot  Acute osteomyelitis involving great toe proximal and distal phalanges.          ASSESSMENT:  Left diabetic foot ulcer, osteomyelitis of left great toe, with exposed soft bone and joint   Gram negative rods on bone biopsy/culture(Proteus)  PAD with poor healing  ESRD with clotted fistula  DM with multiple complications    PLAN:  Stop vanc and deescalate meropenem back to cefepime.  The sensitivity to cefazolin is borderline, ceftriaxone daily would require an indwelling IV and cefepime can be given at dialysis   I would like to see her get this for a couple of weeks, it can be given at dialysis, 2g after each dialysis  Anticipating  revision of her dialysis access on Monday 8/29    Dr. Mercedes will be available as needed over the weekend and will follow-up next week

## 2022-08-26 NOTE — ANESTHESIA POSTPROCEDURE EVALUATION
Anesthesia Post Evaluation    Patient: Leanna García    Procedure(s) Performed: Procedure(s) (LRB):  AMPUTATION, TOE (Left)    Final Anesthesia Type: MAC      Patient location during evaluation: PACU  Patient participation: Yes- Able to Participate  Level of consciousness: awake and alert  Post-procedure vital signs: reviewed and stable  Pain management: adequate  Airway patency: patent    PONV status at discharge: No PONV  Anesthetic complications: no      Cardiovascular status: blood pressure returned to baseline and stable  Respiratory status: unassisted and room air  Hydration status: euvolemic  Follow-up not needed.          Vitals Value Taken Time   /63 08/26/22 1130   Temp 36.4 °C (97.5 °F) 08/26/22 1115   Pulse 18 08/26/22 1144   Resp 20 08/26/22 1138   SpO2 100 % 08/26/22 1138   Vitals shown include unvalidated device data.      Event Time   Out of Recovery 08/26/2022 11:38:20         Pain/Peña Score: Pain Rating Prior to Med Admin: 7 (8/26/2022  6:23 AM)  Peña Score: 10 (8/26/2022 11:30 AM)

## 2022-08-26 NOTE — PROGRESS NOTES
Therapy with Vancomycin complete and / or consult / order discontinued by MD Adamaris on 08/26/2022 @ 1541   Pharmacy will sign off, please re-consult as needed.  Thank you for allowing us to participate in this patient's care.  Soledad Zuniga 8/26/2022 3:46 PM  Dept of Pharmacy  Ext [phone]

## 2022-08-27 LAB
ALBUMIN SERPL BCP-MCNC: 3.3 G/DL (ref 3.5–5.2)
ALP SERPL-CCNC: 64 U/L (ref 55–135)
ALT SERPL W/O P-5'-P-CCNC: 8 U/L (ref 10–44)
ANION GAP SERPL CALC-SCNC: 9 MMOL/L (ref 8–16)
AST SERPL-CCNC: 13 U/L (ref 10–40)
BACTERIA SPEC ANAEROBE CULT: NORMAL
BASOPHILS # BLD AUTO: 0.07 K/UL (ref 0–0.2)
BASOPHILS NFR BLD: 0.7 % (ref 0–1.9)
BILIRUB SERPL-MCNC: 0.6 MG/DL (ref 0.1–1)
BUN SERPL-MCNC: 33 MG/DL (ref 6–20)
CALCIUM SERPL-MCNC: 9.2 MG/DL (ref 8.7–10.5)
CHLORIDE SERPL-SCNC: 100 MMOL/L (ref 95–110)
CO2 SERPL-SCNC: 26 MMOL/L (ref 23–29)
CREAT SERPL-MCNC: 5 MG/DL (ref 0.5–1.4)
DIFFERENTIAL METHOD: ABNORMAL
EOSINOPHIL # BLD AUTO: 0.4 K/UL (ref 0–0.5)
EOSINOPHIL NFR BLD: 3.8 % (ref 0–8)
ERYTHROCYTE [DISTWIDTH] IN BLOOD BY AUTOMATED COUNT: 15.5 % (ref 11.5–14.5)
EST. GFR  (NO RACE VARIABLE): 9.5 ML/MIN/1.73 M^2
GLUCOSE SERPL-MCNC: 110 MG/DL (ref 70–110)
GLUCOSE SERPL-MCNC: 193 MG/DL (ref 70–110)
GLUCOSE SERPL-MCNC: 198 MG/DL (ref 70–110)
GLUCOSE SERPL-MCNC: 221 MG/DL (ref 70–110)
GLUCOSE SERPL-MCNC: 99 MG/DL (ref 70–110)
HCT VFR BLD AUTO: 31.8 % (ref 37–48.5)
HGB BLD-MCNC: 9.8 G/DL (ref 12–16)
IMM GRANULOCYTES # BLD AUTO: 0.1 K/UL (ref 0–0.04)
IMM GRANULOCYTES NFR BLD AUTO: 1 % (ref 0–0.5)
LYMPHOCYTES # BLD AUTO: 2.3 K/UL (ref 1–4.8)
LYMPHOCYTES NFR BLD: 22.1 % (ref 18–48)
MAGNESIUM SERPL-MCNC: 2.1 MG/DL (ref 1.6–2.6)
MCH RBC QN AUTO: 26.4 PG (ref 27–31)
MCHC RBC AUTO-ENTMCNC: 30.8 G/DL (ref 32–36)
MCV RBC AUTO: 86 FL (ref 82–98)
MONOCYTES # BLD AUTO: 1.3 K/UL (ref 0.3–1)
MONOCYTES NFR BLD: 12.6 % (ref 4–15)
NEUTROPHILS # BLD AUTO: 6.1 K/UL (ref 1.8–7.7)
NEUTROPHILS NFR BLD: 59.8 % (ref 38–73)
NRBC BLD-RTO: 0 /100 WBC
PHOSPHATE SERPL-MCNC: 3.7 MG/DL (ref 2.7–4.5)
PLATELET # BLD AUTO: 255 K/UL (ref 150–450)
PMV BLD AUTO: 10.2 FL (ref 9.2–12.9)
POTASSIUM SERPL-SCNC: 4.2 MMOL/L (ref 3.5–5.1)
PROT SERPL-MCNC: 6.9 G/DL (ref 6–8.4)
RBC # BLD AUTO: 3.71 M/UL (ref 4–5.4)
SODIUM SERPL-SCNC: 135 MMOL/L (ref 136–145)
WBC # BLD AUTO: 10.21 K/UL (ref 3.9–12.7)

## 2022-08-27 PROCEDURE — 36415 COLL VENOUS BLD VENIPUNCTURE: CPT | Performed by: NURSE PRACTITIONER

## 2022-08-27 PROCEDURE — 63600175 PHARM REV CODE 636 W HCPCS: Performed by: NURSE PRACTITIONER

## 2022-08-27 PROCEDURE — 25000003 PHARM REV CODE 250: Performed by: INTERNAL MEDICINE

## 2022-08-27 PROCEDURE — 80053 COMPREHEN METABOLIC PANEL: CPT | Performed by: NURSE PRACTITIONER

## 2022-08-27 PROCEDURE — 85025 COMPLETE CBC W/AUTO DIFF WBC: CPT | Performed by: NURSE PRACTITIONER

## 2022-08-27 PROCEDURE — 25000003 PHARM REV CODE 250: Performed by: NURSE PRACTITIONER

## 2022-08-27 PROCEDURE — 63600175 PHARM REV CODE 636 W HCPCS: Performed by: INTERNAL MEDICINE

## 2022-08-27 PROCEDURE — 12000002 HC ACUTE/MED SURGE SEMI-PRIVATE ROOM

## 2022-08-27 PROCEDURE — 84100 ASSAY OF PHOSPHORUS: CPT | Performed by: NURSE PRACTITIONER

## 2022-08-27 PROCEDURE — 83735 ASSAY OF MAGNESIUM: CPT | Performed by: NURSE PRACTITIONER

## 2022-08-27 RX ADMIN — MUPIROCIN 1 G: 20 OINTMENT TOPICAL at 09:08

## 2022-08-27 RX ADMIN — HEPARIN SODIUM 5000 UNITS: 5000 INJECTION INTRAVENOUS; SUBCUTANEOUS at 02:08

## 2022-08-27 RX ADMIN — METOCLOPRAMIDE 5 MG: 5 TABLET ORAL at 08:08

## 2022-08-27 RX ADMIN — INSULIN ASPART 1 UNITS: 100 INJECTION, SOLUTION INTRAVENOUS; SUBCUTANEOUS at 08:08

## 2022-08-27 RX ADMIN — CLOPIDOGREL BISULFATE 75 MG: 75 TABLET, FILM COATED ORAL at 09:08

## 2022-08-27 RX ADMIN — OXYCODONE HYDROCHLORIDE 5 MG: 5 TABLET ORAL at 10:08

## 2022-08-27 RX ADMIN — ATORVASTATIN CALCIUM 80 MG: 40 TABLET, FILM COATED ORAL at 09:08

## 2022-08-27 RX ADMIN — CEFEPIME HYDROCHLORIDE 1 G: 1 INJECTION, SOLUTION INTRAVENOUS at 10:08

## 2022-08-27 RX ADMIN — CALCIUM ACETATE 667 MG: 667 CAPSULE ORAL at 12:08

## 2022-08-27 RX ADMIN — MUPIROCIN 1 G: 20 OINTMENT TOPICAL at 08:08

## 2022-08-27 RX ADMIN — AMLODIPINE BESYLATE 10 MG: 5 TABLET ORAL at 09:08

## 2022-08-27 RX ADMIN — METOCLOPRAMIDE 5 MG: 5 TABLET ORAL at 06:08

## 2022-08-27 RX ADMIN — INSULIN ASPART 0 UNITS: 100 INJECTION, SOLUTION INTRAVENOUS; SUBCUTANEOUS at 06:08

## 2022-08-27 RX ADMIN — SODIUM ZIRCONIUM CYCLOSILICATE 10 G: 5 POWDER, FOR SUSPENSION ORAL at 09:08

## 2022-08-27 RX ADMIN — HYDRALAZINE HYDROCHLORIDE 50 MG: 25 TABLET ORAL at 10:08

## 2022-08-27 RX ADMIN — METOCLOPRAMIDE 5 MG: 5 TABLET ORAL at 10:08

## 2022-08-27 RX ADMIN — LOSARTAN POTASSIUM 100 MG: 50 TABLET, FILM COATED ORAL at 09:08

## 2022-08-27 RX ADMIN — HYDRALAZINE HYDROCHLORIDE 50 MG: 25 TABLET ORAL at 02:08

## 2022-08-27 RX ADMIN — HEPARIN SODIUM 5000 UNITS: 5000 INJECTION INTRAVENOUS; SUBCUTANEOUS at 06:08

## 2022-08-27 RX ADMIN — OXYCODONE HYDROCHLORIDE 5 MG: 5 TABLET ORAL at 06:08

## 2022-08-27 RX ADMIN — HYDRALAZINE HYDROCHLORIDE 50 MG: 25 TABLET ORAL at 06:08

## 2022-08-27 RX ADMIN — CALCIUM ACETATE 667 MG: 667 CAPSULE ORAL at 10:08

## 2022-08-27 RX ADMIN — HEPARIN SODIUM 5000 UNITS: 5000 INJECTION INTRAVENOUS; SUBCUTANEOUS at 10:08

## 2022-08-27 RX ADMIN — CALCIUM ACETATE 667 MG: 667 CAPSULE ORAL at 06:08

## 2022-08-27 RX ADMIN — ASPIRIN 81 MG: 81 TABLET, COATED ORAL at 09:08

## 2022-08-27 NOTE — PROGRESS NOTES
[facility] Medicine  Progress Note    Patient name: Leanna García  MRN: [id number]  Admit Date: 8/18/2022   LOS: 7 days     SUBJECTIVE:     Principal problem: Diabetic ulcer of right midfoot associated with type 2 diabetes mellitus    Interval History:  No acute overnight events reported.    Now that she underwent amputation, states that she is starting to feel sensation back in her toe and endorses moderate level of sharp pain.    Scheduled Meds:   sodium chloride 0.9%   Intravenous Once    amLODIPine  10 mg Oral Daily    aspirin  81 mg Oral Daily    atorvastatin  80 mg Oral Daily    calcium acetate(phosphat bind)  667 mg Oral TID WM    ceFEPime (MAXIPIME) IVPB  1 g Intravenous Q24H    clopidogreL  75 mg Oral Daily    epoetin chris-epbx  4,400 Units Subcutaneous Every Mon, Wed, Fri    heparin (porcine)  5,000 Units Subcutaneous Q8H    hydrALAZINE  50 mg Oral Q8H    insulin detemir U-100  15 Units Subcutaneous BID    losartan  100 mg Oral Daily    metoclopramide HCl  5 mg Oral QID (AC & HS)    mupirocin   Nasal BID    sodium zirconium cyclosilicate  10 g Oral Daily     Continuous Infusions:   alteplase       PRN Meds:acetaminophen, alteplase, heparin (porcine), hydrALAZINE, insulin aspart U-100, melatonin, naloxone, ondansetron, oxyCODONE, senna-docusate 8.6-50 mg, sodium chloride 0.9%, sodium chloride 0.9%, sodium chloride 0.9%    Review of patient's allergies indicates:   Allergen Reactions    Dilaudid [hydromorphone] Nausea And Vomiting    Chlorhexidine Itching    Lortab [hydrocodone-acetaminophen] Itching       Review of Systems: As per interval history    OBJECTIVE:     Vital Signs (Most Recent)  Temp: 98.3 °F (36.8 °C) (08/27/22 0730)  Pulse: 79 (08/27/22 0730)  Resp: 18 (08/27/22 1000)  BP: 133/72 (08/27/22 0959)  SpO2: 97 % (08/27/22 0730)    Vital Signs Range (Last 24H):  Temp:  [97.6 °F (36.4 °C)-98.6 °F (37 °C)]   Pulse:  [65-79]   Resp:  [16-18]   BP: ()/(47-87)   SpO2:   [95 %-100 %]     I & O (Last 24H):    Intake/Output Summary (Last 24 hours) at 8/27/2022 1217  Last data filed at 8/26/2022 1715  Gross per 24 hour   Intake 500 ml   Output 1000 ml   Net -500 ml         Physical Exam:  General: Patient resting comfortably in no acute distress. Appears as stated age. Calm  Eyes: No conjunctival injection. No scleral icterus.  ENT: Hearing grossly intact. No discharge from ears. No nasal discharge.   CVS: RRR. No LE edema BL  Lungs:  No tachypnea or accessory muscle use.  Clear to auscultation bilaterally  Abdomen:  Soft, nontender and nondistended.  No organomegaly  Neuro:  Alert.  Follows commands.  Skin:  Left foot dressing - clean, dry and intact.  Left upper extremity AV fistula with thrill    Laboratory:  I have reviewed all pertinent lab results within the past 24 hours.  CBC:   Recent Labs   Lab 08/27/22  0538   WBC 10.21   RBC 3.71*   HGB 9.8*   HCT 31.8*      MCV 86   MCH 26.4*   MCHC 30.8*       BMP:   Recent Labs   Lab 08/27/22  0538   GLU 99   *   K 4.2      CO2 26   BUN 33*   CREATININE 5.0*   CALCIUM 9.2   MG 2.1         Diagnostic Results:  Labs: Reviewed     ASSESSMENT/PLAN:     Active Hospital Problems    Diagnosis  POA    *Diabetic ulcer of right midfoot associated with type 2 diabetes mellitus [E11.621, L97.419]  Yes    Acute hematogenous osteomyelitis of left foot [M86.072]  Yes    Gastroparesis [K31.84]  Yes    Slow transit constipation [K59.01]  Yes    Ulcer of left foot with necrosis of muscle [L97.523]  Yes    PAD (peripheral artery disease) [I73.9]  Yes    Type 2 diabetes mellitus with kidney complication, with long-term current use of insulin [E11.29, Z79.4]  Not Applicable    ESRD (end stage renal disease) [N18.6]  Yes    Hyperlipemia [E78.5]  Yes    Hypertension associated with diabetes [E11.59, I15.2]  Yes     Chronic      Resolved Hospital Problems    Diagnosis Date Resolved POA    Chest pain [R07.9] 08/19/2022 Yes         Plan:    Diabetic left foot ulcer with muscle necrosis - recently discharged from LTAC after receiving about 10 weeks of antibiotics over the course of last 3 months  Status post bedside excisional debridement of left midfoot on 8/19  MRI of the left forefoot revealed acute osteomyelitis involving great toe proximal and distal phalanges  Status post bone biopsy of the left hallux, excisional debridement ulceration of the left hallux and superficial debridement of the plantar foot on 08/24  Status post amputation 1st toe and partial 1st metatarsal head amputation left foot on 08/26   Cultures growing pansensitive Proteus mirabilis  Antibiotics de-escalated to cefepime.  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 NPO from midnight on Sunday   Discussed with nephrology and vascular surgery Dr. Khoobehi     ESRD on hemodialysis  Chronic anemia ESRD  Nephrology following for dialysis needs  Scheduled potassium binders    Poorly-controlled type 2 DM complicated by foot ulcers  Type 2 DM.  Basal insulin along with low-dose insulin sliding scale.  Increase detemir to 15 units b.i.d.      VTE Risk Mitigation (From admission, onward)           Ordered     heparin (porcine) injection 4,000 Units  As needed (PRN)         08/26/22 1426     heparin (porcine) injection 5,000 Units  Every 8 hours         08/18/22 2311     IP VTE HIGH RISK PATIENT  Once         08/18/22 2311     Place sequential compression device  Until discontinued         08/18/22 2311                        Department Hospital Medicine  Critical access hospital  Andrea Montoya MD  Date of service: 08/27/2022

## 2022-08-27 NOTE — PROGRESS NOTES
"INPATIENT NEPHROLOGY Progress Note   [facility]    Patient Name: Leanna García  Date: 08/27/2022    Reason for consultation: ESRD    Chief Complaint:   Chief Complaint   Patient presents with    Wound Infection     Diabetic pt with a right foot wound that looks to be stage II with mild redness. Pt is concerned and wanted to get it looked at "before it got too bad". Pt currently is being seen by woundcare for her other foot, presented with a wound vac.      History of Present Illness:  Leanna García is a 57 y.o. female with a history as  has a past medical history of A-fib, Anemia due to end stage renal disease (6/30/2022), Arthritis, Bronchitis, Cardiovascular event risk, ASCVD 10-year risk 6.7% (10/15/2016), Diabetes mellitus type II, Diabetic foot infection, Diabetic ulcer of left midfoot (05/05/2022), Disorder of kidney and ureter, Encounter for blood transfusion, ESRD (end stage renal disease) (05/18/2018), MANJINDER (generalized anxiety disorder) (10/25/2016), History of colon polyps (11/02/2016), Hyperlipidemia, Hypertension, and Stroke. who presented to the ED with a Wound Infection (Diabetic pt with a right foot wound that looks to be stage II with mild redness. Pt is concerned and wanted to get it looked at "before it got too bad". Pt currently is being seen by woundcare for her other foot, presented with a wound vac. )     Patient presented to the emergency for evaluation of a right foot wound check. She reports reports while she was waiting she ate a salad. Further states once she got to the ED room, she was given IV ABX and was about to be discharged when she became nauseated and started vomiting. Per the ED, she was given IV zosyn with plans to discharge.  She was then given antiemetics for the NV, but was uncontrolled. Emesis looked like undigested food, so she was then treated with Reglan. ED provider also reports patient then complained of chest pain and abdominal pain.  Patient " "reports generalized CP 20/10 PRS as describes as "feel like i'm being beating up in chest." She further states the chest pain is going all over into my stomach. While at bedside, patient started to force herself to gagged and vomit. Will admit for symptom management with antiemetics. Consult Nephrology for ESRD. Consult Dr. Bruno for diabetic foot wound.        Lab and imaging obtained and reviewed.   CBC shows hemoglobin 11.5 MCH 26.5 MCHC 30.6 RDW 15.4 lymph% 17.7.  CMP shows sodium 135 BUN 54 creatinine 6.2 GFR 7.4 glucose 233 total protein 8.6.  Chest x-ray without acute cardiopulmonary findings. XR right foot shows Degenerative changes of the foot with no acute osseous abnormality observed.     CT abdomen pelvis  IMPRESSION:   1. Incidental parapelvic gallstones.  2. Stable perinephric stranding.  3. Questionable destructive changes of the L5-S1 disc space. If an occult infection is soft tissue consider MR imaging pre and postcontrast to assess this further. This is not changed however significantly since 5/4/2022     Interval History:  8/19- thinks she has food poisoning, Dr. Bruno looking at foot wounds  8/20- wound vac replacement today, discharge per hospitalist and podiatry  8/21- had debridement and wound vac placement yest- discharge per hospitalist and podiatry  8/22 VSS. HD today. Multiple nurses unable to needle he AVF today, clots coming out, good thrill and bruit. Was working OK on Friday. Will get US AVF and attempt again in AM. DO not discharge her.  8/23 VSS. AVF US shows nonocclusive clot - we will attempt to needle and run her today. If unsuccessful - will ask Vascular to see her. Add TID Lokelma for now, K is 5.    Addendum @ 10:43 AM Access not working, will consult Vascular.    8/24 VSS. Awaiting vascular intervention on clotted HD access. K is controlled. HD later today or when access is re-established.  8/25  Seen on dialysis, debbi well.  Scheduled for L gr toe removal tomorrow.  VSS, Hgb " 10.  8/26 VSS. S/p amputation 1st toe and partial 1st metatarsal head amputation left foot by Dr. Ponce on 8/25. Seen on HD today. Change Lokelma to daily. Plan for AVF repair on Monday next week per Dr. Khoobehi.  8/27 VSS. HD on Monday. Lokelma for now.    Plan of Care:    ESRD on HD MWF  HTN  Hyponatremia  Acidosis  Secondary HPT  Anemia of CKD  AVF with non-occlusive clot on AVF  8/22    Plan:    - continue HD MWF  - continue home BP meds- adjust UF goal as needed  - renal diet, 1.5L fluid restriction, Lokelma daily for now  - adjust dialysate as needed  - continue binder with meals as tolerated  - continue FERMÍN with HDas needed  - plan for aneurysmal AVF repair and colateral therapy by Dr. Khoobehi next week    Thank you for allowing us to participate in this patient's care. We will continue to follow.    Vital Signs:  Temp Readings from Last 3 Encounters:   08/27/22 98.3 °F (36.8 °C) (Oral)   08/12/22 98 °F (36.7 °C)   07/29/22 96.7 °F (35.9 °C) (Axillary)       Pulse Readings from Last 3 Encounters:   08/27/22 79   08/12/22 66   07/29/22 75       BP Readings from Last 3 Encounters:   08/27/22 133/72   08/12/22 (!) 182/79   07/29/22 132/89       Weight:  Wt Readings from Last 3 Encounters:   08/21/22 85.7 kg (188 lb 15 oz)   08/07/22 87.3 kg (192 lb 7.4 oz)   07/24/22 78.9 kg (173 lb 14.4 oz)     Medications:  Scheduled Meds:   sodium chloride 0.9%   Intravenous Once    amLODIPine  10 mg Oral Daily    aspirin  81 mg Oral Daily    atorvastatin  80 mg Oral Daily    calcium acetate(phosphat bind)  667 mg Oral TID WM    ceFEPime (MAXIPIME) IVPB  1 g Intravenous Q24H    clopidogreL  75 mg Oral Daily    epoetin chris-epbx  4,400 Units Subcutaneous Every Mon, Wed, Fri    heparin (porcine)  5,000 Units Subcutaneous Q8H    hydrALAZINE  50 mg Oral Q8H    insulin detemir U-100  15 Units Subcutaneous BID    losartan  100 mg Oral Daily    metoclopramide HCl  5 mg Oral QID (AC & HS)    mupirocin   Nasal BID    sodium  zirconium cyclosilicate  10 g Oral Daily     Continuous Infusions:   alteplase       PRN Meds:.acetaminophen, alteplase, heparin (porcine), hydrALAZINE, insulin aspart U-100, melatonin, naloxone, ondansetron, oxyCODONE, senna-docusate 8.6-50 mg, sodium chloride 0.9%, sodium chloride 0.9%, sodium chloride 0.9%  No current facility-administered medications on file prior to encounter.     Current Outpatient Medications on File Prior to Encounter   Medication Sig Dispense Refill    amLODIPine (NORVASC) 10 MG tablet Take 1 tablet (10 mg total) by mouth once daily.      aspirin (ECOTRIN) 81 MG EC tablet Take 81 mg by mouth once daily.      atorvastatin (LIPITOR) 80 MG tablet Take 1 tablet (80 mg total) by mouth once daily. 90 tablet 3    azelastine (ASTELIN) 137 mcg (0.1 %) nasal spray 1 spray (137 mcg total) by Nasal route 2 (two) times a day. 30 mL 0    calcium acetate,phosphat bind, (PHOSLO) 667 mg capsule Take 1 capsule (667 mg total) by mouth 3 (three) times daily with meals.      cetirizine (ZYRTEC) 10 MG tablet Take 1 tablet (10 mg total) by mouth every other day.      clopidogreL (PLAVIX) 75 mg tablet Take 1 tablet (75 mg total) by mouth once daily. 30 tablet 0    epoetin chris-epbx (RETACRIT) 4,000 unit/mL injection Inject 1.11 mLs (4,440 Units total) into the skin every Mon, Wed, Fri.      fluticasone propionate (FLONASE) 50 mcg/actuation nasal spray 2 sprays (100 mcg total) by Each Nostril route once daily.      gabapentin (NEURONTIN) 100 MG capsule Take 1 capsule (100 mg total) by mouth 2 (two) times daily.      hydrALAZINE (APRESOLINE) 50 MG tablet Take 1 tablet (50 mg total) by mouth every 8 (eight) hours. 90 tablet 0    insulin aspart U-100 (NOVOLOG FLEXPEN U-100 INSULIN) 100 unit/mL (3 mL) InPn pen Inject 5-8 units 3 times per day with food. (Patient taking differently: Inject 5-8 Units into the skin 3 (three) times daily with meals. Inject 5-8 units 3 times per day with food.) 1 Box 6    insulin detemir  "U-100 (LEVEMIR FLEXTOUCH U-100 INSULN) 100 unit/mL (3 mL) InPn pen Inject 15 Units into the skin every evening.      lactulose (CHRONULAC) 10 gram/15 mL solution Take 20 g by mouth 2 (two) times a day.      losartan (COZAAR) 100 MG tablet Take 1 tablet (100 mg total) by mouth once daily.      ondansetron (ZOFRAN-ODT) 8 MG TbDL Take 1 tablet (8 mg total) by mouth every 8 (eight) hours as needed (nausea).      oxyCODONE (ROXICODONE) 5 MG immediate release tablet Take 1 tablet (5 mg total) by mouth every 6 (six) hours as needed for Pain.      polyethylene glycol (GLYCOLAX) 17 gram PwPk Take 17 g by mouth 2 (two) times daily as needed.      senna-docusate 8.6-50 mg (PERICOLACE) 8.6-50 mg per tablet Take 1 tablet by mouth 2 (two) times daily.      blood sugar diagnostic Strp To check BG 4 times daily, to use with insurance preferred meter (Patient taking differently: 1 strip by Misc.(Non-Drug; Combo Route) route 4 (four) times daily. To check BG 4 times daily, to use with insurance preferred meter) 450 strip 3    pen needle, diabetic (BD ULTRA-FINE ROBERT PEN NEEDLE) 32 gauge x 5/32" Ndle To use 4 times per day with insulin injections. (Patient taking differently: 1 pen by Misc.(Non-Drug; Combo Route) route 4 (four) times daily. To use 4 times per day with insulin injections.) 450 each 2    [DISCONTINUED] blood-glucose meter (ACCU-CHEK KAYLIE PLUS METER) Misc To use to check blood sugars 4 times a day 1 each 0    [DISCONTINUED] clorazepate (TRANXENE) 3.75 MG Tab Take 7.5 mg by mouth nightly as needed.       [DISCONTINUED] dextrose (GLUCOSE GEL) 40 % gel Take 37.5 mLs (15,000 mg total) by mouth once as needed (hypoglycemia). 37.5 g 4    [DISCONTINUED] ezetimibe (ZETIA) 10 mg tablet Take 1 tablet (10 mg total) by mouth once daily.      [DISCONTINUED] fenofibrate 160 MG Tab Take 1 tablet (160 mg total) by mouth once daily. 90 tablet 3    [DISCONTINUED] lancing device with lancets (ACCU-CHEK SOFT DEV LANCETS) Kit To check blood " sugars 4 times per day 400 each 3    [DISCONTINUED] pantoprazole (PROTONIX) 40 MG tablet Take 1 tablet (40 mg total) by mouth once daily.         Review of Systems:  Neg    Physical Exam:  Constitutional: nad, aao x 3  Heart: rrr, no m/r/g, no edema  Lungs: ctab, no w/r/r/c, no lb  Abdomen: s/nt/nd, +BS    Results:  Recent Labs   Lab 08/25/22  0921 08/26/22  0640 08/27/22  0538   * 134* 135*   K 5.2* 4.8 4.2   CL 98 98 100   CO2 23 23 26   * 53* 33*   CREATININE 10.9* 6.8* 5.0*   * 100 99         Recent Labs   Lab 08/25/22 0921 08/26/22  0640 08/27/22  0538   CALCIUM 9.9 9.8 9.2   ALBUMIN 3.4* 3.5 3.3*   PHOS 5.0* 4.0 3.7   MG 2.2 2.2 2.1         Recent Labs   Lab 01/30/20  0705 03/10/22  0650   PTH, Intact 466.0 H 387.0 H         No results for input(s): POCTGLUCOSE in the last 168 hours.    Recent Labs   Lab 05/05/22  0320 05/29/22  1324 06/22/22  0618   Hemoglobin A1C 10.6 H 8.3 H 7.5 H         Recent Labs   Lab 08/25/22 0922 08/26/22  0640 08/27/22  0538   WBC 10.25 9.17 10.21   HGB 10.5* 11.0* 9.8*   HCT 33.8* 36.4* 31.8*    276 255   MCV 86 88 86   MCHC 31.1* 30.2* 30.8*   MONO 10.1  1.0 12.2  1.1* 12.6  1.3*         Recent Labs   Lab 08/25/22 0921 08/26/22  0640 08/27/22  0538   BILITOT 0.7 0.6 0.6   PROT 7.3 7.7 6.9   ALBUMIN 3.4* 3.5 3.3*   ALKPHOS 67 73 64   ALT 12 9* 8*   AST 16 17 13         Recent Labs   Lab 05/29/22  0900 06/21/22  1612   Color, UA Yellow Yellow   Appearance, UA Clear Clear   pH, UA 8.0 >8.0 A   Specific [place], UA 1.015 1.010   Protein, UA 2+ A 3+ A   Glucose, UA 2+ A 2+ A   Ketones, UA Negative Negative   Urobilinogen, UA Negative Negative   Bilirubin (UA) Negative Negative   Occult Blood UA Negative Negative   Nitrite, UA Negative Negative   RBC, UA 1 1   WBC, UA 5 2   Bacteria Rare Negative   Hyaline Casts, UA 0 12 A         Recent Labs   Lab 05/19/21  0300   POC PH 7.273 L   POC PCO2 47.8 H   POC HCO3 22.1 L   POC PO2 22 LL   POC SATURATED O2 30 L    POC BE -5   Sample VENOUS       I have spent > minutes providing care for this patient for the above diagnoses. These services have included chart/data/imaging review, evaluation, exam, formulation of plan, , note preparation, and discussions with staff involved in this patient's care.    Bryce Craig MD    [facility]  [address] [place] [id number]  [phone] (p)  [phone] (f)

## 2022-08-28 LAB
ABO + RH BLD: NORMAL
ALBUMIN SERPL BCP-MCNC: 3.5 G/DL (ref 3.5–5.2)
ALP SERPL-CCNC: 68 U/L (ref 55–135)
ALT SERPL W/O P-5'-P-CCNC: 7 U/L (ref 10–44)
ANION GAP SERPL CALC-SCNC: 14 MMOL/L (ref 8–16)
AST SERPL-CCNC: 16 U/L (ref 10–40)
BASOPHILS # BLD AUTO: 0.05 K/UL (ref 0–0.2)
BASOPHILS NFR BLD: 0.5 % (ref 0–1.9)
BILIRUB SERPL-MCNC: 0.6 MG/DL (ref 0.1–1)
BLD GP AB SCN CELLS X3 SERPL QL: NORMAL
BUN SERPL-MCNC: 47 MG/DL (ref 6–20)
CALCIUM SERPL-MCNC: 9.5 MG/DL (ref 8.7–10.5)
CHLORIDE SERPL-SCNC: 100 MMOL/L (ref 95–110)
CO2 SERPL-SCNC: 22 MMOL/L (ref 23–29)
CREAT SERPL-MCNC: 6.9 MG/DL (ref 0.5–1.4)
DIFFERENTIAL METHOD: ABNORMAL
EOSINOPHIL # BLD AUTO: 0.4 K/UL (ref 0–0.5)
EOSINOPHIL NFR BLD: 4.1 % (ref 0–8)
ERYTHROCYTE [DISTWIDTH] IN BLOOD BY AUTOMATED COUNT: 15.7 % (ref 11.5–14.5)
EST. GFR  (NO RACE VARIABLE): 6.5 ML/MIN/1.73 M^2
GLUCOSE SERPL-MCNC: 100 MG/DL (ref 70–110)
GLUCOSE SERPL-MCNC: 155 MG/DL (ref 70–110)
GLUCOSE SERPL-MCNC: 162 MG/DL (ref 70–110)
GLUCOSE SERPL-MCNC: 231 MG/DL (ref 70–110)
GLUCOSE SERPL-MCNC: 94 MG/DL (ref 70–110)
HCT VFR BLD AUTO: 33.8 % (ref 37–48.5)
HGB BLD-MCNC: 10.4 G/DL (ref 12–16)
IMM GRANULOCYTES # BLD AUTO: 0.1 K/UL (ref 0–0.04)
IMM GRANULOCYTES NFR BLD AUTO: 1.1 % (ref 0–0.5)
LYMPHOCYTES # BLD AUTO: 2.6 K/UL (ref 1–4.8)
LYMPHOCYTES NFR BLD: 27.5 % (ref 18–48)
MAGNESIUM SERPL-MCNC: 2.2 MG/DL (ref 1.6–2.6)
MCH RBC QN AUTO: 26.9 PG (ref 27–31)
MCHC RBC AUTO-ENTMCNC: 30.8 G/DL (ref 32–36)
MCV RBC AUTO: 87 FL (ref 82–98)
MONOCYTES # BLD AUTO: 1 K/UL (ref 0.3–1)
MONOCYTES NFR BLD: 11.1 % (ref 4–15)
NEUTROPHILS # BLD AUTO: 5.2 K/UL (ref 1.8–7.7)
NEUTROPHILS NFR BLD: 55.7 % (ref 38–73)
NRBC BLD-RTO: 0 /100 WBC
PHOSPHATE SERPL-MCNC: 4.4 MG/DL (ref 2.7–4.5)
PLATELET # BLD AUTO: 263 K/UL (ref 150–450)
PMV BLD AUTO: 10.4 FL (ref 9.2–12.9)
POTASSIUM SERPL-SCNC: 4.4 MMOL/L (ref 3.5–5.1)
PROT SERPL-MCNC: 7.5 G/DL (ref 6–8.4)
RBC # BLD AUTO: 3.87 M/UL (ref 4–5.4)
SODIUM SERPL-SCNC: 136 MMOL/L (ref 136–145)
WBC # BLD AUTO: 9.35 K/UL (ref 3.9–12.7)

## 2022-08-28 PROCEDURE — 36415 COLL VENOUS BLD VENIPUNCTURE: CPT | Performed by: SURGERY

## 2022-08-28 PROCEDURE — 25000003 PHARM REV CODE 250: Performed by: NURSE PRACTITIONER

## 2022-08-28 PROCEDURE — 25000003 PHARM REV CODE 250: Performed by: INTERNAL MEDICINE

## 2022-08-28 PROCEDURE — 63600175 PHARM REV CODE 636 W HCPCS: Performed by: INTERNAL MEDICINE

## 2022-08-28 PROCEDURE — 83735 ASSAY OF MAGNESIUM: CPT | Performed by: NURSE PRACTITIONER

## 2022-08-28 PROCEDURE — 94761 N-INVAS EAR/PLS OXIMETRY MLT: CPT

## 2022-08-28 PROCEDURE — 63600175 PHARM REV CODE 636 W HCPCS: Performed by: NURSE PRACTITIONER

## 2022-08-28 PROCEDURE — 94799 UNLISTED PULMONARY SVC/PX: CPT

## 2022-08-28 PROCEDURE — 86901 BLOOD TYPING SEROLOGIC RH(D): CPT | Performed by: SURGERY

## 2022-08-28 PROCEDURE — 99900035 HC TECH TIME PER 15 MIN (STAT)

## 2022-08-28 PROCEDURE — 36415 COLL VENOUS BLD VENIPUNCTURE: CPT | Performed by: NURSE PRACTITIONER

## 2022-08-28 PROCEDURE — 85025 COMPLETE CBC W/AUTO DIFF WBC: CPT | Performed by: NURSE PRACTITIONER

## 2022-08-28 PROCEDURE — 80053 COMPREHEN METABOLIC PANEL: CPT | Performed by: NURSE PRACTITIONER

## 2022-08-28 PROCEDURE — 12000002 HC ACUTE/MED SURGE SEMI-PRIVATE ROOM

## 2022-08-28 PROCEDURE — 84100 ASSAY OF PHOSPHORUS: CPT | Performed by: NURSE PRACTITIONER

## 2022-08-28 RX ORDER — SYRING-NEEDL,DISP,INSUL,0.3 ML 29 G X1/2"
296 SYRINGE, EMPTY DISPOSABLE MISCELLANEOUS ONCE
Status: DISCONTINUED | OUTPATIENT
Start: 2022-08-28 | End: 2022-08-29

## 2022-08-28 RX ADMIN — LOSARTAN POTASSIUM 100 MG: 50 TABLET, FILM COATED ORAL at 08:08

## 2022-08-28 RX ADMIN — HYDRALAZINE HYDROCHLORIDE 50 MG: 25 TABLET ORAL at 03:08

## 2022-08-28 RX ADMIN — ASPIRIN 81 MG: 81 TABLET, COATED ORAL at 08:08

## 2022-08-28 RX ADMIN — AMLODIPINE BESYLATE 10 MG: 5 TABLET ORAL at 08:08

## 2022-08-28 RX ADMIN — CLOPIDOGREL BISULFATE 75 MG: 75 TABLET, FILM COATED ORAL at 08:08

## 2022-08-28 RX ADMIN — HYDRALAZINE HYDROCHLORIDE 50 MG: 25 TABLET ORAL at 09:08

## 2022-08-28 RX ADMIN — HEPARIN SODIUM 5000 UNITS: 5000 INJECTION INTRAVENOUS; SUBCUTANEOUS at 03:08

## 2022-08-28 RX ADMIN — METOCLOPRAMIDE 5 MG: 5 TABLET ORAL at 12:08

## 2022-08-28 RX ADMIN — OXYCODONE HYDROCHLORIDE 5 MG: 5 TABLET ORAL at 05:08

## 2022-08-28 RX ADMIN — HEPARIN SODIUM 5000 UNITS: 5000 INJECTION INTRAVENOUS; SUBCUTANEOUS at 06:08

## 2022-08-28 RX ADMIN — METOCLOPRAMIDE 5 MG: 5 TABLET ORAL at 06:08

## 2022-08-28 RX ADMIN — METOCLOPRAMIDE 5 MG: 5 TABLET ORAL at 05:08

## 2022-08-28 RX ADMIN — HEPARIN SODIUM 5000 UNITS: 5000 INJECTION INTRAVENOUS; SUBCUTANEOUS at 09:08

## 2022-08-28 RX ADMIN — MUPIROCIN 1 G: 20 OINTMENT TOPICAL at 08:08

## 2022-08-28 RX ADMIN — SENNOSIDES AND DOCUSATE SODIUM 1 TABLET: 50; 8.6 TABLET ORAL at 06:08

## 2022-08-28 RX ADMIN — INSULIN ASPART 1 UNITS: 100 INJECTION, SOLUTION INTRAVENOUS; SUBCUTANEOUS at 09:08

## 2022-08-28 RX ADMIN — ATORVASTATIN CALCIUM 80 MG: 40 TABLET, FILM COATED ORAL at 08:08

## 2022-08-28 RX ADMIN — SODIUM ZIRCONIUM CYCLOSILICATE 10 G: 5 POWDER, FOR SUSPENSION ORAL at 08:08

## 2022-08-28 RX ADMIN — CALCIUM ACETATE 667 MG: 667 CAPSULE ORAL at 12:08

## 2022-08-28 RX ADMIN — CALCIUM ACETATE 667 MG: 667 CAPSULE ORAL at 05:08

## 2022-08-28 RX ADMIN — OXYCODONE HYDROCHLORIDE 5 MG: 5 TABLET ORAL at 08:08

## 2022-08-28 RX ADMIN — CEFEPIME HYDROCHLORIDE 1 G: 1 INJECTION, SOLUTION INTRAVENOUS at 09:08

## 2022-08-28 RX ADMIN — METOCLOPRAMIDE 5 MG: 5 TABLET ORAL at 09:08

## 2022-08-28 RX ADMIN — MUPIROCIN 1 G: 20 OINTMENT TOPICAL at 09:08

## 2022-08-28 RX ADMIN — MELATONIN 6 MG: at 11:08

## 2022-08-28 RX ADMIN — CALCIUM ACETATE 667 MG: 667 CAPSULE ORAL at 08:08

## 2022-08-28 NOTE — ANESTHESIA PREPROCEDURE EVALUATION
08/28/2022  Leanna García is a 57 y.o., female.      Pre-op Assessment    I have reviewed the Patient Summary Reports.     I have reviewed the Nursing Notes. I have reviewed the NPO Status.   I have reviewed the Medications.     Review of Systems  Anesthesia Hx:  Denies Family Hx of Anesthesia complications.   Denies Personal Hx of Anesthesia complications.   Social:  Non-Smoker, No Alcohol Use    Hematology/Oncology:     Oncology Normal    -- Anemia:   EENT/Dental:EENT/Dental Normal   Cardiovascular:   Hypertension Dysrhythmias atrial fibrillation hyperlipidemia 05/06/2022 echo shows 65% EF, normal diastolic function, mild MR   Pulmonary:  Pulmonary Normal    Renal/:   Chronic Renal Disease (Dialysis MWF), ESRD, Dialysis    Musculoskeletal:   Arthritis     Neurological:   TIA, Occasional left upper extremity tremor  Peripheral Neuropathy ( feet)    Endocrine:   Diabetes (Glucose 153 this morning), poorly controlled, using insulin    Psych:   anxiety          Physical Exam  General: Well nourished and Alert    Airway:  Mallampati: III / II  Mouth Opening: Normal  TM Distance: Normal  Tongue: Normal  Neck ROM: Normal ROM    Dental:Multiple missing.  Chest/Lungs:  Clear to auscultation, Normal Respiratory Rate    Heart:  Rate: Normal  Rhythm: Regular Rhythm  Sounds: Normal  Murmur: Systolic;        Anesthesia Plan  Type of Anesthesia, risks & benefits discussed:    Anesthesia Type: Gen ETT  Intra-op Monitoring Plan: Standard ASA Monitors  Post Op Pain Control Plan: multimodal analgesia and IV/PO Opioids PRN  Induction:  IV  Airway Plan: Direct, Post-Induction  Informed Consent: Informed consent signed with the Patient and all parties understand the risks and agree with anesthesia plan.  All questions answered. Patient consented to blood products? Yes  ASA Score: 3  Anesthesia Plan Notes: No  Decadron.  GETA  Multimodal analgesia with ofirmev 1000mg, and ketamine 25mg.  PONV prophylaxis with Pepcid 20 mg IV, and Zofran 4 mg IV.      Ready For Surgery From Anesthesia Perspective.     .

## 2022-08-28 NOTE — PROGRESS NOTES
"INPATIENT NEPHROLOGY Progress Note   [facility]    Patient Name: Leanna García  Date: 08/28/2022    Reason for consultation: ESRD    Chief Complaint:   Chief Complaint   Patient presents with    Wound Infection     Diabetic pt with a right foot wound that looks to be stage II with mild redness. Pt is concerned and wanted to get it looked at "before it got too bad". Pt currently is being seen by woundcare for her other foot, presented with a wound vac.      History of Present Illness:  Leanna García is a 57 y.o. female with a history as  has a past medical history of A-fib, Anemia due to end stage renal disease (6/30/2022), Arthritis, Bronchitis, Cardiovascular event risk, ASCVD 10-year risk 6.7% (10/15/2016), Diabetes mellitus type II, Diabetic foot infection, Diabetic ulcer of left midfoot (05/05/2022), Disorder of kidney and ureter, Encounter for blood transfusion, ESRD (end stage renal disease) (05/18/2018), MANJINDER (generalized anxiety disorder) (10/25/2016), History of colon polyps (11/02/2016), Hyperlipidemia, Hypertension, and Stroke. who presented to the ED with a Wound Infection (Diabetic pt with a right foot wound that looks to be stage II with mild redness. Pt is concerned and wanted to get it looked at "before it got too bad". Pt currently is being seen by woundcare for her other foot, presented with a wound vac. )     Patient presented to the emergency for evaluation of a right foot wound check. She reports reports while she was waiting she ate a salad. Further states once she got to the ED room, she was given IV ABX and was about to be discharged when she became nauseated and started vomiting. Per the ED, she was given IV zosyn with plans to discharge.  She was then given antiemetics for the NV, but was uncontrolled. Emesis looked like undigested food, so she was then treated with Reglan. ED provider also reports patient then complained of chest pain and abdominal pain.  Patient " "reports generalized CP 20/10 PRS as describes as "feel like i'm being beating up in chest." She further states the chest pain is going all over into my stomach. While at bedside, patient started to force herself to gagged and vomit. Will admit for symptom management with antiemetics. Consult Nephrology for ESRD. Consult Dr. Bruno for diabetic foot wound.        Lab and imaging obtained and reviewed.   CBC shows hemoglobin 11.5 MCH 26.5 MCHC 30.6 RDW 15.4 lymph% 17.7.  CMP shows sodium 135 BUN 54 creatinine 6.2 GFR 7.4 glucose 233 total protein 8.6.  Chest x-ray without acute cardiopulmonary findings. XR right foot shows Degenerative changes of the foot with no acute osseous abnormality observed.     CT abdomen pelvis  IMPRESSION:   1. Incidental parapelvic gallstones.  2. Stable perinephric stranding.  3. Questionable destructive changes of the L5-S1 disc space. If an occult infection is soft tissue consider MR imaging pre and postcontrast to assess this further. This is not changed however significantly since 5/4/2022     Interval History:  8/19- thinks she has food poisoning, Dr. Bruno looking at foot wounds  8/20- wound vac replacement today, discharge per hospitalist and podiatry  8/21- had debridement and wound vac placement yest- discharge per hospitalist and podiatry  8/22 VSS. HD today. Multiple nurses unable to needle he AVF today, clots coming out, good thrill and bruit. Was working OK on Friday. Will get US AVF and attempt again in AM. DO not discharge her.  8/23 VSS. AVF US shows nonocclusive clot - we will attempt to needle and run her today. If unsuccessful - will ask Vascular to see her. Add TID Lokelma for now, K is 5.    Addendum @ 10:43 AM Access not working, will consult Vascular.    8/24 VSS. Awaiting vascular intervention on clotted HD access. K is controlled. HD later today or when access is re-established.  8/25  Seen on dialysis, debbi well.  Scheduled for L gr toe removal tomorrow.  VSS, Hgb " 10.  8/26 VSS. S/p amputation 1st toe and partial 1st metatarsal head amputation left foot by Dr. Ponce on 8/25. Seen on HD today. Change Lokelma to daily. Plan for AVF repair on Monday next week per Dr. Khoobehi.  8/27 VSS. HD on Monday. Lokelma for now.  8/28 VSS. HD tomorrow via HD line. AVF repair by Dr. Khoobehi in AM.    Plan of Care:    ESRD on HD MWF  HTN  Hyponatremia  Acidosis  Secondary HPT  Anemia of CKD  AVF with non-occlusive clot on AVF  8/22, plan for repair before dc    Plan:    - continue HD MWF  - continue home BP meds- adjust UF goal as needed  - renal diet, 1.5L fluid restriction, Lokelma daily for now  - adjust dialysate as needed  - continue binder with meals as tolerated  - continue FERMÍN with HDas needed  - plan for aneurysmal AVF repair and colateral therapy by Dr. Khoobehi this coming week    Thank you for allowing us to participate in this patient's care. We will continue to follow.    Vital Signs:  Temp Readings from Last 3 Encounters:   08/28/22 98 °F (36.7 °C) (Oral)   08/12/22 98 °F (36.7 °C)   07/29/22 96.7 °F (35.9 °C) (Axillary)       Pulse Readings from Last 3 Encounters:   08/28/22 71   08/12/22 66   07/29/22 75       BP Readings from Last 3 Encounters:   08/28/22 (!) 159/76   08/12/22 (!) 182/79   07/29/22 132/89       Weight:  Wt Readings from Last 3 Encounters:   08/21/22 85.7 kg (188 lb 15 oz)   08/07/22 87.3 kg (192 lb 7.4 oz)   07/24/22 78.9 kg (173 lb 14.4 oz)     Medications:  Scheduled Meds:   sodium chloride 0.9%   Intravenous Once    amLODIPine  10 mg Oral Daily    aspirin  81 mg Oral Daily    atorvastatin  80 mg Oral Daily    calcium acetate(phosphat bind)  667 mg Oral TID WM    ceFEPime (MAXIPIME) IVPB  1 g Intravenous Q24H    clopidogreL  75 mg Oral Daily    epoetin chris-epbx  4,400 Units Subcutaneous Every Mon, Wed, Fri    heparin (porcine)  5,000 Units Subcutaneous Q8H    hydrALAZINE  50 mg Oral Q8H    insulin detemir U-100  15 Units Subcutaneous BID    losartan   100 mg Oral Daily    metoclopramide HCl  5 mg Oral QID (AC & HS)    mupirocin   Nasal BID    sodium zirconium cyclosilicate  10 g Oral Daily     Continuous Infusions:   alteplase       PRN Meds:.acetaminophen, alteplase, heparin (porcine), hydrALAZINE, insulin aspart U-100, melatonin, naloxone, ondansetron, oxyCODONE, senna-docusate 8.6-50 mg, sodium chloride 0.9%, sodium chloride 0.9%, sodium chloride 0.9%  No current facility-administered medications on file prior to encounter.     Current Outpatient Medications on File Prior to Encounter   Medication Sig Dispense Refill    amLODIPine (NORVASC) 10 MG tablet Take 1 tablet (10 mg total) by mouth once daily.      aspirin (ECOTRIN) 81 MG EC tablet Take 81 mg by mouth once daily.      atorvastatin (LIPITOR) 80 MG tablet Take 1 tablet (80 mg total) by mouth once daily. 90 tablet 3    azelastine (ASTELIN) 137 mcg (0.1 %) nasal spray 1 spray (137 mcg total) by Nasal route 2 (two) times a day. 30 mL 0    calcium acetate,phosphat bind, (PHOSLO) 667 mg capsule Take 1 capsule (667 mg total) by mouth 3 (three) times daily with meals.      cetirizine (ZYRTEC) 10 MG tablet Take 1 tablet (10 mg total) by mouth every other day.      clopidogreL (PLAVIX) 75 mg tablet Take 1 tablet (75 mg total) by mouth once daily. 30 tablet 0    epoetin chris-epbx (RETACRIT) 4,000 unit/mL injection Inject 1.11 mLs (4,440 Units total) into the skin every Mon, Wed, Fri.      fluticasone propionate (FLONASE) 50 mcg/actuation nasal spray 2 sprays (100 mcg total) by Each Nostril route once daily.      gabapentin (NEURONTIN) 100 MG capsule Take 1 capsule (100 mg total) by mouth 2 (two) times daily.      hydrALAZINE (APRESOLINE) 50 MG tablet Take 1 tablet (50 mg total) by mouth every 8 (eight) hours. 90 tablet 0    insulin aspart U-100 (NOVOLOG FLEXPEN U-100 INSULIN) 100 unit/mL (3 mL) InPn pen Inject 5-8 units 3 times per day with food. (Patient taking differently: Inject 5-8 Units into the skin 3  "(three) times daily with meals. Inject 5-8 units 3 times per day with food.) 1 Box 6    insulin detemir U-100 (LEVEMIR FLEXTOUCH U-100 INSULN) 100 unit/mL (3 mL) InPn pen Inject 15 Units into the skin every evening.      lactulose (CHRONULAC) 10 gram/15 mL solution Take 20 g by mouth 2 (two) times a day.      losartan (COZAAR) 100 MG tablet Take 1 tablet (100 mg total) by mouth once daily.      ondansetron (ZOFRAN-ODT) 8 MG TbDL Take 1 tablet (8 mg total) by mouth every 8 (eight) hours as needed (nausea).      oxyCODONE (ROXICODONE) 5 MG immediate release tablet Take 1 tablet (5 mg total) by mouth every 6 (six) hours as needed for Pain.      polyethylene glycol (GLYCOLAX) 17 gram PwPk Take 17 g by mouth 2 (two) times daily as needed.      senna-docusate 8.6-50 mg (PERICOLACE) 8.6-50 mg per tablet Take 1 tablet by mouth 2 (two) times daily.      blood sugar diagnostic Strp To check BG 4 times daily, to use with insurance preferred meter (Patient taking differently: 1 strip by Misc.(Non-Drug; Combo Route) route 4 (four) times daily. To check BG 4 times daily, to use with insurance preferred meter) 450 strip 3    pen needle, diabetic (BD ULTRA-FINE ROBERT PEN NEEDLE) 32 gauge x 5/32" Ndle To use 4 times per day with insulin injections. (Patient taking differently: 1 pen by Misc.(Non-Drug; Combo Route) route 4 (four) times daily. To use 4 times per day with insulin injections.) 450 each 2    [DISCONTINUED] blood-glucose meter (ACCU-CHEK KAYLIE PLUS METER) Misc To use to check blood sugars 4 times a day 1 each 0    [DISCONTINUED] clorazepate (TRANXENE) 3.75 MG Tab Take 7.5 mg by mouth nightly as needed.       [DISCONTINUED] dextrose (GLUCOSE GEL) 40 % gel Take 37.5 mLs (15,000 mg total) by mouth once as needed (hypoglycemia). 37.5 g 4    [DISCONTINUED] ezetimibe (ZETIA) 10 mg tablet Take 1 tablet (10 mg total) by mouth once daily.      [DISCONTINUED] fenofibrate 160 MG Tab Take 1 tablet (160 mg total) by mouth once daily. " 90 tablet 3    [DISCONTINUED] lancing device with lancets (ACCU-CHEK SOFT DEV LANCETS) Kit To check blood sugars 4 times per day 400 each 3    [DISCONTINUED] pantoprazole (PROTONIX) 40 MG tablet Take 1 tablet (40 mg total) by mouth once daily.         Review of Systems:  Neg    Physical Exam:  Constitutional: nad, aao x 3  Heart: rrr, no m/r/g, no edema  Lungs: ctab, no w/r/r/c, no lb  Abdomen: s/nt/nd, +BS    Results:  Recent Labs   Lab 08/26/22  0640 08/27/22  0538 08/28/22  0659   * 135* 136   K 4.8 4.2 4.4   CL 98 100 100   CO2 23 26 22*   BUN 53* 33* 47*   CREATININE 6.8* 5.0* 6.9*    99 94         Recent Labs   Lab 08/26/22  0640 08/27/22  0538 08/28/22  0659   CALCIUM 9.8 9.2 9.5   ALBUMIN 3.5 3.3* 3.5   PHOS 4.0 3.7 4.4   MG 2.2 2.1 2.2         Recent Labs   Lab 01/30/20  0705 03/10/22  0650   PTH, Intact 466.0 H 387.0 H         No results for input(s): POCTGLUCOSE in the last 168 hours.    Recent Labs   Lab 05/05/22  0320 05/29/22  1324 06/22/22  0618   Hemoglobin A1C 10.6 H 8.3 H 7.5 H         Recent Labs   Lab 08/26/22  0640 08/27/22  0538 08/28/22  0659   WBC 9.17 10.21 9.35   HGB 11.0* 9.8* 10.4*   HCT 36.4* 31.8* 33.8*    255 263   MCV 88 86 87   MCHC 30.2* 30.8* 30.8*   MONO 12.2  1.1* 12.6  1.3* 11.1  1.0         Recent Labs   Lab 08/26/22  0640 08/27/22  0538 08/28/22  0659   BILITOT 0.6 0.6 0.6   PROT 7.7 6.9 7.5   ALBUMIN 3.5 3.3* 3.5   ALKPHOS 73 64 68   ALT 9* 8* 7*   AST 17 13 16         Recent Labs   Lab 05/29/22  0900 06/21/22  1612   Color, UA Yellow Yellow   Appearance, UA Clear Clear   pH, UA 8.0 >8.0 A   Specific [place], UA 1.015 1.010   Protein, UA 2+ A 3+ A   Glucose, UA 2+ A 2+ A   Ketones, UA Negative Negative   Urobilinogen, UA Negative Negative   Bilirubin (UA) Negative Negative   Occult Blood UA Negative Negative   Nitrite, UA Negative Negative   RBC, UA 1 1   WBC, UA 5 2   Bacteria Rare Negative   Hyaline Casts, UA 0 12 A         Recent Labs   Lab  05/19/21  0300   POC PH 7.273 L   POC PCO2 47.8 H   POC HCO3 22.1 L   POC PO2 22 LL   POC SATURATED O2 30 L   POC BE -5   Sample VENOUS       I have spent > minutes providing care for this patient for the above diagnoses. These services have included chart/data/imaging review, evaluation, exam, formulation of plan, , note preparation, and discussions with staff involved in this patient's care.    Bryce Craig MD    [facility] [address] [address]  [phone] (p)  [phone] (f)

## 2022-08-28 NOTE — PROGRESS NOTES
[facility] Medicine  Progress Note    Patient name: Leanna García  MRN: [id number]  Admit Date: 8/18/2022   LOS: 8 days     SUBJECTIVE:     Principal problem: Diabetic ulcer of left midfoot associated with type 2 diabetes mellitus    Interval History:  No acute overnight events reported.  Left foot toe pain is stable.  Tolerating antibiotics without any issues.  Denies nausea or vomiting.  She is able to bear weight without significant pain.  Awaiting AV fistula repair tomorrow.    Scheduled Meds:   sodium chloride 0.9%   Intravenous Once    amLODIPine  10 mg Oral Daily    aspirin  81 mg Oral Daily    atorvastatin  80 mg Oral Daily    calcium acetate(phosphat bind)  667 mg Oral TID WM    ceFEPime (MAXIPIME) IVPB  1 g Intravenous Q24H    clopidogreL  75 mg Oral Daily    epoetin chris-epbx  4,400 Units Subcutaneous Every Mon, Wed, Fri    heparin (porcine)  5,000 Units Subcutaneous Q8H    hydrALAZINE  50 mg Oral Q8H    insulin detemir U-100  15 Units Subcutaneous BID    losartan  100 mg Oral Daily    metoclopramide HCl  5 mg Oral QID (AC & HS)    mupirocin   Nasal BID    sodium zirconium cyclosilicate  10 g Oral Daily     Continuous Infusions:   alteplase       PRN Meds:acetaminophen, alteplase, heparin (porcine), hydrALAZINE, insulin aspart U-100, melatonin, naloxone, ondansetron, oxyCODONE, senna-docusate 8.6-50 mg, sodium chloride 0.9%, sodium chloride 0.9%, sodium chloride 0.9%    Review of patient's allergies indicates:   Allergen Reactions    Dilaudid [hydromorphone] Nausea And Vomiting    Chlorhexidine Itching    Lortab [hydrocodone-acetaminophen] Itching       Review of Systems: As per interval history    OBJECTIVE:     Vital Signs (Most Recent)  Temp: 98 °F (36.7 °C) (08/28/22 1130)  Pulse: 71 (08/28/22 1130)  Resp: 18 (08/28/22 1130)  BP: (!) 159/76 (08/28/22 1130)  SpO2: 96 % (08/28/22 1130)    Vital Signs Range (Last 24H):  Temp:  [97.6 °F (36.4 °C)-99.2 °F (37.3 °C)]   Pulse:   [61-73]   Resp:  [18]   BP: (123-159)/(49-76)   SpO2:  [94 %-98 %]     I & O (Last 24H):    Intake/Output Summary (Last 24 hours) at 8/28/2022 1456  Last data filed at 8/28/2022 0830  Gross per 24 hour   Intake 710 ml   Output 100 ml   Net 610 ml       Physical Exam:  General: Patient resting comfortably in no acute distress. Appears as stated age. Calm  Eyes: No conjunctival injection. No scleral icterus.  ENT: Hearing grossly intact. No discharge from ears. No nasal discharge.   CVS: RRR. No LE edema BL  Lungs:  No tachypnea or accessory muscle use.  Clear to auscultation bilaterally  Abdomen:  Soft, nontender and nondistended.  No organomegaly  Neuro:  Alert.  Follows commands.  Skin:  Left foot dressing - clean, dry and intact.  Left upper extremity AV fistula with thrill    Laboratory:  I have reviewed all pertinent lab results within the past 24 hours.  CBC:   Recent Labs   Lab 08/28/22  0659   WBC 9.35   RBC 3.87*   HGB 10.4*   HCT 33.8*      MCV 87   MCH 26.9*   MCHC 30.8*     BMP:   Recent Labs   Lab 08/28/22  0659   GLU 94      K 4.4      CO2 22*   BUN 47*   CREATININE 6.9*   CALCIUM 9.5   MG 2.2       Diagnostic Results:  Labs: Reviewed     ASSESSMENT/PLAN:     Active Hospital Problems    Diagnosis  POA    *Diabetic ulcer of left midfoot associated with type 2 diabetes mellitus [E11.621, L97.429]  Yes    Acute hematogenous osteomyelitis of left foot [M86.072]  Yes    Gastroparesis [K31.84]  Yes    Slow transit constipation [K59.01]  Yes    Ulcer of left foot with necrosis of muscle [L97.523]  Yes    PAD (peripheral artery disease) [I73.9]  Yes    Type 2 diabetes mellitus with kidney complication, with long-term current use of insulin [E11.29, Z79.4]  Not Applicable    ESRD (end stage renal disease) [N18.6]  Yes    Hyperlipemia [E78.5]  Yes    Hypertension associated with diabetes [E11.59, I15.2]  Yes     Chronic      Resolved Hospital Problems    Diagnosis Date Resolved POA    Chest  pain [R07.9] 08/19/2022 Yes         Plan:   Diabetic left foot ulcer with muscle necrosis - recently discharged from LT[facility] after receiving about 10 weeks of antibiotics over the course of last 3 months  Status post bedside excisional debridement of left midfoot on 8/19  MRI of the left forefoot revealed acute osteomyelitis involving great toe proximal and distal phalanges  Status post bone biopsy of the left hallux, excisional debridement ulceration of the left hallux and superficial debridement of the plantar foot on 08/24  Status post amputation 1st toe and partial 1st metatarsal head amputation left foot on 08/26   Cultures growing pansensitive Proteus mirabilis  Antibiotics de-escalated to cefepime.  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 NPO from midnight  Discussed with nephrology and vascular surgery Dr. Khoobehi     ESRD on hemodialysis  Chronic anemia ESRD  Nephrology following for dialysis needs  Scheduled potassium binders    Poorly-controlled type 2 DM complicated by foot ulcers  Type 2 DM.  Basal insulin along with low-dose insulin sliding scale.  Increase detemir to 15 units b.i.d.      VTE Risk Mitigation (From admission, onward)           Ordered     heparin (porcine) injection 4,000 Units  As needed (PRN)         08/26/22 1426     heparin (porcine) injection 5,000 Units  Every 8 hours         08/18/22 2311     IP VTE HIGH RISK PATIENT  Once         08/18/22 2311     Place sequential compression device  Until discontinued         08/18/22 2311                        Department Hospital Medicine  formerly [facility]  Andrea Montoya MD  Date of service: 08/28/2022

## 2022-08-28 NOTE — PLAN OF CARE
Problem: Adult Inpatient Plan of Care  Goal: Plan of Care Review  Outcome: Ongoing, Progressing  Goal: Patient-Specific Goal (Individualized)  Outcome: Ongoing, Progressing  Goal: Absence of Hospital-Acquired Illness or Injury  Outcome: Ongoing, Progressing  Goal: Optimal Comfort and Wellbeing  Outcome: Ongoing, Progressing  Goal: Readiness for Transition of Care  Outcome: Ongoing, Progressing     Problem: Diabetes Comorbidity  Goal: Blood Glucose Level Within Targeted Range  Outcome: Ongoing, Progressing     Problem: Infection  Goal: Absence of Infection Signs and Symptoms  Outcome: Ongoing, Progressing     Problem: Impaired Wound Healing  Goal: Optimal Wound Healing  Outcome: Ongoing, Progressing     Problem: Skin Injury Risk Increased  Goal: Skin Health and Integrity  Outcome: Ongoing, Progressing     Problem: Device-Related Complication Risk (Hemodialysis)  Goal: Safe, Effective Therapy Delivery  Outcome: Ongoing, Progressing     Problem: Hemodynamic Instability (Hemodialysis)  Goal: Effective Tissue Perfusion  Outcome: Ongoing, Progressing     Problem: Infection (Hemodialysis)  Goal: Absence of Infection Signs and Symptoms  Outcome: Ongoing, Progressing     Problem: Oral Intake Inadequate  Goal: Improved Oral Intake  Outcome: Ongoing, Progressing     Problem: Fall Injury Risk  Goal: Absence of Fall and Fall-Related Injury  Outcome: Ongoing, Progressing

## 2022-08-29 ENCOUNTER — ANESTHESIA (OUTPATIENT)
Dept: SURGERY | Facility: HOSPITAL | Age: 57
DRG: 617 | End: 2022-08-29
Payer: MEDICARE

## 2022-08-29 LAB
ALBUMIN SERPL BCP-MCNC: 3.4 G/DL (ref 3.5–5.2)
ALP SERPL-CCNC: 80 U/L (ref 55–135)
ALT SERPL W/O P-5'-P-CCNC: 10 U/L (ref 10–44)
ANION GAP SERPL CALC-SCNC: 14 MMOL/L (ref 8–16)
AST SERPL-CCNC: 19 U/L (ref 10–40)
BASOPHILS # BLD AUTO: 0.07 K/UL (ref 0–0.2)
BASOPHILS NFR BLD: 0.7 % (ref 0–1.9)
BILIRUB SERPL-MCNC: 0.5 MG/DL (ref 0.1–1)
BUN SERPL-MCNC: 60 MG/DL (ref 6–20)
CALCIUM SERPL-MCNC: 9.2 MG/DL (ref 8.7–10.5)
CHLORIDE SERPL-SCNC: 97 MMOL/L (ref 95–110)
CO2 SERPL-SCNC: 21 MMOL/L (ref 23–29)
CREAT SERPL-MCNC: 7.6 MG/DL (ref 0.5–1.4)
DIFFERENTIAL METHOD: ABNORMAL
EOSINOPHIL # BLD AUTO: 0.4 K/UL (ref 0–0.5)
EOSINOPHIL NFR BLD: 3.6 % (ref 0–8)
ERYTHROCYTE [DISTWIDTH] IN BLOOD BY AUTOMATED COUNT: 15.8 % (ref 11.5–14.5)
EST. GFR  (NO RACE VARIABLE): 5.8 ML/MIN/1.73 M^2
GLUCOSE SERPL-MCNC: 145 MG/DL (ref 70–110)
GLUCOSE SERPL-MCNC: 155 MG/DL (ref 70–110)
GLUCOSE SERPL-MCNC: 158 MG/DL (ref 70–110)
GLUCOSE SERPL-MCNC: 160 MG/DL (ref 70–110)
HCT VFR BLD AUTO: 32.3 % (ref 37–48.5)
HGB BLD-MCNC: 9.9 G/DL (ref 12–16)
IMM GRANULOCYTES # BLD AUTO: 0.1 K/UL (ref 0–0.04)
IMM GRANULOCYTES NFR BLD AUTO: 1 % (ref 0–0.5)
LYMPHOCYTES # BLD AUTO: 2.2 K/UL (ref 1–4.8)
LYMPHOCYTES NFR BLD: 22.1 % (ref 18–48)
MAGNESIUM SERPL-MCNC: 2.2 MG/DL (ref 1.6–2.6)
MCH RBC QN AUTO: 26.3 PG (ref 27–31)
MCHC RBC AUTO-ENTMCNC: 30.7 G/DL (ref 32–36)
MCV RBC AUTO: 86 FL (ref 82–98)
MONOCYTES # BLD AUTO: 1 K/UL (ref 0.3–1)
MONOCYTES NFR BLD: 10.4 % (ref 4–15)
NEUTROPHILS # BLD AUTO: 6.1 K/UL (ref 1.8–7.7)
NEUTROPHILS NFR BLD: 62.2 % (ref 38–73)
NRBC BLD-RTO: 0 /100 WBC
PHOSPHATE SERPL-MCNC: 4.8 MG/DL (ref 2.7–4.5)
PLATELET # BLD AUTO: 279 K/UL (ref 150–450)
PMV BLD AUTO: 10.3 FL (ref 9.2–12.9)
POTASSIUM SERPL-SCNC: 4.4 MMOL/L (ref 3.5–5.1)
PROT SERPL-MCNC: 7.3 G/DL (ref 6–8.4)
RBC # BLD AUTO: 3.77 M/UL (ref 4–5.4)
SODIUM SERPL-SCNC: 132 MMOL/L (ref 136–145)
WBC # BLD AUTO: 9.84 K/UL (ref 3.9–12.7)

## 2022-08-29 PROCEDURE — 25000003 PHARM REV CODE 250: Performed by: NURSE PRACTITIONER

## 2022-08-29 PROCEDURE — 36415 COLL VENOUS BLD VENIPUNCTURE: CPT | Performed by: NURSE PRACTITIONER

## 2022-08-29 PROCEDURE — C1757 CATH, THROMBECTOMY/EMBOLECT: HCPCS | Performed by: SURGERY

## 2022-08-29 PROCEDURE — 63600175 PHARM REV CODE 636 W HCPCS: Performed by: NURSE ANESTHETIST, CERTIFIED REGISTERED

## 2022-08-29 PROCEDURE — 85025 COMPLETE CBC W/AUTO DIFF WBC: CPT | Performed by: NURSE PRACTITIONER

## 2022-08-29 PROCEDURE — 25000003 PHARM REV CODE 250: Performed by: INTERNAL MEDICINE

## 2022-08-29 PROCEDURE — 37000008 HC ANESTHESIA 1ST 15 MINUTES: Performed by: SURGERY

## 2022-08-29 PROCEDURE — 27000080 OPTIME MED/SURG SUP & DEVICES GENERAL CLASSIFICATION: Performed by: SURGERY

## 2022-08-29 PROCEDURE — 63600175 PHARM REV CODE 636 W HCPCS: Mod: JG | Performed by: NURSE PRACTITIONER

## 2022-08-29 PROCEDURE — 97607 NEG PRS WND THR NDME<=50SQCM: CPT

## 2022-08-29 PROCEDURE — 83735 ASSAY OF MAGNESIUM: CPT | Performed by: NURSE PRACTITIONER

## 2022-08-29 PROCEDURE — 84100 ASSAY OF PHOSPHORUS: CPT | Performed by: NURSE PRACTITIONER

## 2022-08-29 PROCEDURE — 63600175 PHARM REV CODE 636 W HCPCS: Performed by: INTERNAL MEDICINE

## 2022-08-29 PROCEDURE — 63600175 PHARM REV CODE 636 W HCPCS: Performed by: ANESTHESIOLOGY

## 2022-08-29 PROCEDURE — 80053 COMPREHEN METABOLIC PANEL: CPT | Performed by: NURSE PRACTITIONER

## 2022-08-29 PROCEDURE — 12000002 HC ACUTE/MED SURGE SEMI-PRIVATE ROOM

## 2022-08-29 PROCEDURE — 25000003 PHARM REV CODE 250: Performed by: NURSE ANESTHETIST, CERTIFIED REGISTERED

## 2022-08-29 PROCEDURE — 37000009 HC ANESTHESIA EA ADD 15 MINS: Performed by: SURGERY

## 2022-08-29 PROCEDURE — 63600175 PHARM REV CODE 636 W HCPCS: Performed by: SURGERY

## 2022-08-29 PROCEDURE — 71000033 HC RECOVERY, INTIAL HOUR: Performed by: SURGERY

## 2022-08-29 PROCEDURE — 25000003 PHARM REV CODE 250: Performed by: SURGERY

## 2022-08-29 PROCEDURE — 36000706: Performed by: SURGERY

## 2022-08-29 PROCEDURE — 90935 HEMODIALYSIS ONE EVALUATION: CPT

## 2022-08-29 PROCEDURE — 71000039 HC RECOVERY, EACH ADD'L HOUR: Performed by: SURGERY

## 2022-08-29 PROCEDURE — 27201423 OPTIME MED/SURG SUP & DEVICES STERILE SUPPLY: Performed by: SURGERY

## 2022-08-29 PROCEDURE — 88304 TISSUE EXAM BY PATHOLOGIST: CPT | Mod: TC

## 2022-08-29 PROCEDURE — 36000707: Performed by: SURGERY

## 2022-08-29 RX ORDER — OXYCODONE HYDROCHLORIDE 5 MG/1
5 TABLET ORAL
Status: DISCONTINUED | OUTPATIENT
Start: 2022-08-29 | End: 2022-08-29 | Stop reason: HOSPADM

## 2022-08-29 RX ORDER — ONDANSETRON 2 MG/ML
4 INJECTION INTRAMUSCULAR; INTRAVENOUS DAILY PRN
Status: DISCONTINUED | OUTPATIENT
Start: 2022-08-29 | End: 2022-08-29 | Stop reason: HOSPADM

## 2022-08-29 RX ORDER — LIDOCAINE HYDROCHLORIDE 20 MG/ML
INJECTION, SOLUTION EPIDURAL; INFILTRATION; INTRACAUDAL; PERINEURAL
Status: DISCONTINUED | OUTPATIENT
Start: 2022-08-29 | End: 2022-08-29

## 2022-08-29 RX ORDER — PROTAMINE SULFATE 10 MG/ML
INJECTION, SOLUTION INTRAVENOUS
Status: DISCONTINUED | OUTPATIENT
Start: 2022-08-29 | End: 2022-08-29

## 2022-08-29 RX ORDER — FAMOTIDINE 10 MG/ML
INJECTION INTRAVENOUS
Status: DISCONTINUED | OUTPATIENT
Start: 2022-08-29 | End: 2022-08-29

## 2022-08-29 RX ORDER — FENTANYL CITRATE 50 UG/ML
25 INJECTION, SOLUTION INTRAMUSCULAR; INTRAVENOUS EVERY 5 MIN PRN
Status: DISCONTINUED | OUTPATIENT
Start: 2022-08-29 | End: 2022-08-29 | Stop reason: HOSPADM

## 2022-08-29 RX ORDER — SODIUM CHLORIDE 0.9 % (FLUSH) 0.9 %
10 SYRINGE (ML) INJECTION
Status: DISCONTINUED | OUTPATIENT
Start: 2022-08-29 | End: 2022-08-30 | Stop reason: HOSPADM

## 2022-08-29 RX ORDER — PROPOFOL 10 MG/ML
VIAL (ML) INTRAVENOUS
Status: DISCONTINUED | OUTPATIENT
Start: 2022-08-29 | End: 2022-08-29

## 2022-08-29 RX ORDER — ACETAMINOPHEN 10 MG/ML
INJECTION, SOLUTION INTRAVENOUS
Status: DISCONTINUED | OUTPATIENT
Start: 2022-08-29 | End: 2022-08-29

## 2022-08-29 RX ORDER — DIPHENHYDRAMINE HYDROCHLORIDE 50 MG/ML
12.5 INJECTION INTRAMUSCULAR; INTRAVENOUS
Status: DISCONTINUED | OUTPATIENT
Start: 2022-08-29 | End: 2022-08-29 | Stop reason: HOSPADM

## 2022-08-29 RX ORDER — ROCURONIUM BROMIDE 10 MG/ML
INJECTION, SOLUTION INTRAVENOUS
Status: DISCONTINUED | OUTPATIENT
Start: 2022-08-29 | End: 2022-08-29

## 2022-08-29 RX ORDER — HEPARIN SODIUM 5000 [USP'U]/ML
INJECTION, SOLUTION INTRAVENOUS; SUBCUTANEOUS
Status: DISCONTINUED | OUTPATIENT
Start: 2022-08-29 | End: 2022-08-29 | Stop reason: HOSPADM

## 2022-08-29 RX ORDER — SUCCINYLCHOLINE CHLORIDE 20 MG/ML
INJECTION INTRAMUSCULAR; INTRAVENOUS
Status: DISCONTINUED | OUTPATIENT
Start: 2022-08-29 | End: 2022-08-29

## 2022-08-29 RX ORDER — MEPERIDINE HYDROCHLORIDE 50 MG/ML
12.5 INJECTION INTRAMUSCULAR; INTRAVENOUS; SUBCUTANEOUS EVERY 10 MIN PRN
Status: DISCONTINUED | OUTPATIENT
Start: 2022-08-29 | End: 2022-08-29 | Stop reason: HOSPADM

## 2022-08-29 RX ORDER — FENTANYL CITRATE 50 UG/ML
INJECTION, SOLUTION INTRAMUSCULAR; INTRAVENOUS
Status: DISCONTINUED | OUTPATIENT
Start: 2022-08-29 | End: 2022-08-29

## 2022-08-29 RX ORDER — PHENYLEPHRINE HYDROCHLORIDE 10 MG/ML
INJECTION INTRAVENOUS
Status: DISCONTINUED | OUTPATIENT
Start: 2022-08-29 | End: 2022-08-29

## 2022-08-29 RX ORDER — EPHEDRINE SULFATE 50 MG/ML
INJECTION, SOLUTION INTRAVENOUS
Status: DISCONTINUED | OUTPATIENT
Start: 2022-08-29 | End: 2022-08-29

## 2022-08-29 RX ORDER — ONDANSETRON 2 MG/ML
INJECTION INTRAMUSCULAR; INTRAVENOUS
Status: DISCONTINUED | OUTPATIENT
Start: 2022-08-29 | End: 2022-08-29

## 2022-08-29 RX ORDER — HEPARIN SODIUM 10000 [USP'U]/ML
INJECTION, SOLUTION INTRAVENOUS; SUBCUTANEOUS
Status: DISCONTINUED | OUTPATIENT
Start: 2022-08-29 | End: 2022-08-29

## 2022-08-29 RX ADMIN — ONDANSETRON 4 MG: 2 INJECTION INTRAMUSCULAR; INTRAVENOUS at 01:08

## 2022-08-29 RX ADMIN — ATORVASTATIN CALCIUM 80 MG: 40 TABLET, FILM COATED ORAL at 09:08

## 2022-08-29 RX ADMIN — EPOETIN ALFA-EPBX 4400 UNITS: 10000 INJECTION, SOLUTION INTRAVENOUS; SUBCUTANEOUS at 06:08

## 2022-08-29 RX ADMIN — LIDOCAINE HYDROCHLORIDE 80 MG: 20 INJECTION, SOLUTION INTRAVENOUS at 10:08

## 2022-08-29 RX ADMIN — ROCURONIUM BROMIDE 10 MG: 10 INJECTION, SOLUTION INTRAVENOUS at 10:08

## 2022-08-29 RX ADMIN — HEPARIN SODIUM 5000 UNITS: 10000 INJECTION, SOLUTION INTRAVENOUS; SUBCUTANEOUS at 12:08

## 2022-08-29 RX ADMIN — PHENYLEPHRINE HYDROCHLORIDE 200 MCG: 10 INJECTION INTRAVENOUS at 11:08

## 2022-08-29 RX ADMIN — HYDRALAZINE HYDROCHLORIDE 50 MG: 25 TABLET ORAL at 05:08

## 2022-08-29 RX ADMIN — OXYCODONE HYDROCHLORIDE 5 MG: 5 TABLET ORAL at 09:08

## 2022-08-29 RX ADMIN — LOSARTAN POTASSIUM 100 MG: 50 TABLET, FILM COATED ORAL at 09:08

## 2022-08-29 RX ADMIN — EPHEDRINE SULFATE 15 MG: 50 INJECTION INTRAVENOUS at 11:08

## 2022-08-29 RX ADMIN — AMLODIPINE BESYLATE 10 MG: 5 TABLET ORAL at 09:08

## 2022-08-29 RX ADMIN — ROCURONIUM BROMIDE 20 MG: 10 INJECTION, SOLUTION INTRAVENOUS at 12:08

## 2022-08-29 RX ADMIN — HEPARIN SODIUM 4000 UNITS: 1000 INJECTION, SOLUTION INTRAVENOUS; SUBCUTANEOUS at 07:08

## 2022-08-29 RX ADMIN — HEPARIN SODIUM 5000 UNITS: 5000 INJECTION INTRAVENOUS; SUBCUTANEOUS at 09:08

## 2022-08-29 RX ADMIN — ROCURONIUM BROMIDE 20 MG: 10 INJECTION, SOLUTION INTRAVENOUS at 11:08

## 2022-08-29 RX ADMIN — ACETAMINOPHEN 1000 MG: 10 INJECTION, SOLUTION INTRAVENOUS at 11:08

## 2022-08-29 RX ADMIN — MUPIROCIN 1 G: 20 OINTMENT TOPICAL at 09:08

## 2022-08-29 RX ADMIN — SUGAMMADEX 200 MG: 100 INJECTION, SOLUTION INTRAVENOUS at 01:08

## 2022-08-29 RX ADMIN — CEFEPIME HYDROCHLORIDE 1 G: 1 INJECTION, SOLUTION INTRAVENOUS at 09:08

## 2022-08-29 RX ADMIN — FAMOTIDINE 20 MG: 10 INJECTION, SOLUTION INTRAVENOUS at 10:08

## 2022-08-29 RX ADMIN — PROTAMINE SULFATE 30 MG: 10 INJECTION, SOLUTION INTRAVENOUS at 01:08

## 2022-08-29 RX ADMIN — OXYCODONE HYDROCHLORIDE 5 MG: 5 TABLET ORAL at 05:08

## 2022-08-29 RX ADMIN — SENNOSIDES AND DOCUSATE SODIUM 1 TABLET: 50; 8.6 TABLET ORAL at 09:08

## 2022-08-29 RX ADMIN — METOCLOPRAMIDE 5 MG: 5 TABLET ORAL at 09:08

## 2022-08-29 RX ADMIN — SUCCINYLCHOLINE CHLORIDE 120 MG: 20 INJECTION, SOLUTION INTRAMUSCULAR; INTRAVENOUS at 10:08

## 2022-08-29 RX ADMIN — ONDANSETRON 4 MG: 2 INJECTION INTRAMUSCULAR; INTRAVENOUS at 10:08

## 2022-08-29 RX ADMIN — SODIUM CHLORIDE: 900 INJECTION INTRAVENOUS at 10:08

## 2022-08-29 RX ADMIN — ONDANSETRON 4 MG: 2 INJECTION INTRAMUSCULAR; INTRAVENOUS at 05:08

## 2022-08-29 RX ADMIN — MELATONIN 6 MG: at 09:08

## 2022-08-29 RX ADMIN — SODIUM CHLORIDE: 900 INJECTION INTRAVENOUS at 12:08

## 2022-08-29 RX ADMIN — PHENYLEPHRINE HYDROCHLORIDE 100 MCG: 10 INJECTION INTRAVENOUS at 12:08

## 2022-08-29 RX ADMIN — FENTANYL CITRATE 100 MCG: 50 INJECTION INTRAMUSCULAR; INTRAVENOUS at 10:08

## 2022-08-29 RX ADMIN — FENTANYL CITRATE 25 MCG: 50 INJECTION, SOLUTION INTRAMUSCULAR; INTRAVENOUS at 01:08

## 2022-08-29 RX ADMIN — EPHEDRINE SULFATE 10 MG: 50 INJECTION INTRAVENOUS at 11:08

## 2022-08-29 RX ADMIN — PROPOFOL 80 MG: 10 INJECTION, EMULSION INTRAVENOUS at 10:08

## 2022-08-29 RX ADMIN — PHENYLEPHRINE HYDROCHLORIDE 100 MCG: 10 INJECTION INTRAVENOUS at 11:08

## 2022-08-29 RX ADMIN — METOCLOPRAMIDE 5 MG: 5 TABLET ORAL at 05:08

## 2022-08-29 NOTE — CARE UPDATE
[facility] inquiring about the patient eligibility for a new walker.

## 2022-08-29 NOTE — CARE UPDATE
spoke with Tigist Grimes/ [facility] in [facility]#[phone], when the patient discharges they  will need d/c summary, h&p, and home health orders faxed to their intake dept at [phone].

## 2022-08-29 NOTE — ANESTHESIA PROCEDURE NOTES
Intubation    Date/Time: 8/29/2022 10:57 AM  Performed by: Wilfrido Monzon CRNA  Authorized by: Javier Villa Jr., MD     Intubation:     Induction:  Intravenous    Intubated:  Postinduction    Mask Ventilation:  Easy mask    Attempts:  1    Attempted By:  CRNA    Method of Intubation:  Video laryngoscopy    Blade:  Dickson 3    Laryngeal View Grade: Grade I - full view of cords      Difficult Airway Encountered?: No      Complications:  None    Airway Device:  Oral endotracheal tube    Airway Device Size:  7.5    Style/Cuff Inflation:  Cuffed (inflated to minimal occlusive pressure)    Tube secured:  21    Secured at:  The lips    Placement Verified By:  Capnometry and Revisualization with laryngoscopy    Complicating Factors:  None    Findings Post-Intubation:  BS equal bilateral

## 2022-08-29 NOTE — PROGRESS NOTES
"INPATIENT NEPHROLOGY Progress Note   [facility]    Patient Name: Leanna García  Date: 08/29/2022    Reason for consultation: ESRD    Chief Complaint:   Chief Complaint   Patient presents with    Wound Infection     Diabetic pt with a right foot wound that looks to be stage II with mild redness. Pt is concerned and wanted to get it looked at "before it got too bad". Pt currently is being seen by woundcare for her other foot, presented with a wound vac.      History of Present Illness:  Leanna García is a 57 y.o. female with a history as  has a past medical history of A-fib, Anemia due to end stage renal disease (6/30/2022), Arthritis, Bronchitis, Cardiovascular event risk, ASCVD 10-year risk 6.7% (10/15/2016), Diabetes mellitus type II, Diabetic foot infection, Diabetic ulcer of left midfoot (05/05/2022), Disorder of kidney and ureter, Encounter for blood transfusion, ESRD (end stage renal disease) (05/18/2018), MANJINDER (generalized anxiety disorder) (10/25/2016), History of colon polyps (11/02/2016), Hyperlipidemia, Hypertension, and Stroke. who presented to the ED with a Wound Infection (Diabetic pt with a right foot wound that looks to be stage II with mild redness. Pt is concerned and wanted to get it looked at "before it got too bad". Pt currently is being seen by woundcare for her other foot, presented with a wound vac. )     Patient presented to the emergency for evaluation of a right foot wound check. She reports reports while she was waiting she ate a salad. Further states once she got to the ED room, she was given IV ABX and was about to be discharged when she became nauseated and started vomiting. Per the ED, she was given IV zosyn with plans to discharge.  She was then given antiemetics for the NV, but was uncontrolled. Emesis looked like undigested food, so she was then treated with Reglan. ED provider also reports patient then complained of chest pain and abdominal pain.  Patient " "reports generalized CP 20/10 PRS as describes as "feel like i'm being beating up in chest." She further states the chest pain is going all over into my stomach. While at bedside, patient started to force herself to gagged and vomit. Will admit for symptom management with antiemetics. Consult Nephrology for ESRD. Consult Dr. Bruno for diabetic foot wound.        Lab and imaging obtained and reviewed.   CBC shows hemoglobin 11.5 MCH 26.5 MCHC 30.6 RDW 15.4 lymph% 17.7.  CMP shows sodium 135 BUN 54 creatinine 6.2 GFR 7.4 glucose 233 total protein 8.6.  Chest x-ray without acute cardiopulmonary findings. XR right foot shows Degenerative changes of the foot with no acute osseous abnormality observed.     CT abdomen pelvis  IMPRESSION:   1. Incidental parapelvic gallstones.  2. Stable perinephric stranding.  3. Questionable destructive changes of the L5-S1 disc space. If an occult infection is soft tissue consider MR imaging pre and postcontrast to assess this further. This is not changed however significantly since 5/4/2022     Interval History:  8/19- thinks she has food poisoning, Dr. Bruno looking at foot wounds  8/20- wound vac replacement today, discharge per hospitalist and podiatry  8/21- had debridement and wound vac placement yest- discharge per hospitalist and podiatry  8/22 VSS. HD today. Multiple nurses unable to needle he AVF today, clots coming out, good thrill and bruit. Was working OK on Friday. Will get US AVF and attempt again in AM. DO not discharge her.  8/23 VSS. AVF US shows nonocclusive clot - we will attempt to needle and run her today. If unsuccessful - will ask Vascular to see her. Add TID Lokelma for now, K is 5.    Addendum @ 10:43 AM Access not working, will consult Vascular.    8/24 VSS. Awaiting vascular intervention on clotted HD access. K is controlled. HD later today or when access is re-established.  8/25  Seen on dialysis, debbi well.  Scheduled for L gr toe removal tomorrow.  VSS, Hgb " 10.  8/26 VSS. S/p amputation 1st toe and partial 1st metatarsal head amputation left foot by Dr. Ponce on 8/25. Seen on HD today. Change Lokelma to daily. Plan for AVF repair on Monday next week per Dr. Khoobehi.  8/27 VSS. HD on Monday. Lokelma for now.  8/28 VSS. HD tomorrow via HD line. AVF repair by Dr. Khoobehi in AM.  8/29  lab results reviewed, VSS.  No complaints.  AVF repair to be done today.    Plan of Care:    ESRD on HD MWF  HTN  Hyponatremia  Acidosis  Secondary HPT  Anemia of CKD  AVF with non-occlusive clot on AVF US 8/22, plan for repair before dc    Plan:    - continue HD MWF  - continue home BP meds- adjust UF goal as needed  - renal diet, 1.5L fluid restriction, Lokelma daily for now  - adjust dialysate as needed  - continue binder with meals as tolerated  - continue FERMÍN with HDas needed  - plan for aneurysmal AVF repair and colateral therapy by Dr. Khoobehi this coming week    Thank you for allowing us to participate in this patient's care. We will continue to follow.    Vital Signs:  Temp Readings from Last 3 Encounters:   08/29/22 97.3 °F (36.3 °C) (Oral)   08/12/22 98 °F (36.7 °C)   07/29/22 96.7 °F (35.9 °C) (Axillary)       Pulse Readings from Last 3 Encounters:   08/29/22 66   08/12/22 66   07/29/22 75       BP Readings from Last 3 Encounters:   08/29/22 (!) 162/72   08/12/22 (!) 182/79   07/29/22 132/89       Weight:  Wt Readings from Last 3 Encounters:   08/21/22 85.7 kg (188 lb 15 oz)   08/07/22 87.3 kg (192 lb 7.4 oz)   07/24/22 78.9 kg (173 lb 14.4 oz)     Medications:  Scheduled Meds:   sodium chloride 0.9%   Intravenous Once    amLODIPine  10 mg Oral Daily    aspirin  81 mg Oral Daily    atorvastatin  80 mg Oral Daily    calcium acetate(phosphat bind)  667 mg Oral TID WM    ceFEPime (MAXIPIME) IVPB  1 g Intravenous Q24H    clopidogreL  75 mg Oral Daily    epoetin chris-epbx  4,400 Units Subcutaneous Every Mon, Wed, Fri    heparin (porcine)  5,000 Units Subcutaneous Q8H     hydrALAZINE  50 mg Oral Q8H    insulin detemir U-100  15 Units Subcutaneous BID    losartan  100 mg Oral Daily    magnesium citrate  296 mL Oral Once    metoclopramide HCl  5 mg Oral QID (AC & HS)    mupirocin   Nasal BID    sodium zirconium cyclosilicate  10 g Oral Daily     Continuous Infusions:   alteplase       PRN Meds:.acetaminophen, alteplase, heparin (porcine), hydrALAZINE, insulin aspart U-100, melatonin, naloxone, ondansetron, oxyCODONE, senna-docusate 8.6-50 mg, sodium chloride 0.9%, sodium chloride 0.9%, sodium chloride 0.9%  No current facility-administered medications on file prior to encounter.     Current Outpatient Medications on File Prior to Encounter   Medication Sig Dispense Refill    amLODIPine (NORVASC) 10 MG tablet Take 1 tablet (10 mg total) by mouth once daily.      aspirin (ECOTRIN) 81 MG EC tablet Take 81 mg by mouth once daily.      atorvastatin (LIPITOR) 80 MG tablet Take 1 tablet (80 mg total) by mouth once daily. 90 tablet 3    azelastine (ASTELIN) 137 mcg (0.1 %) nasal spray 1 spray (137 mcg total) by Nasal route 2 (two) times a day. 30 mL 0    calcium acetate,phosphat bind, (PHOSLO) 667 mg capsule Take 1 capsule (667 mg total) by mouth 3 (three) times daily with meals.      cetirizine (ZYRTEC) 10 MG tablet Take 1 tablet (10 mg total) by mouth every other day.      clopidogreL (PLAVIX) 75 mg tablet Take 1 tablet (75 mg total) by mouth once daily. 30 tablet 0    epoetin chris-epbx (RETACRIT) 4,000 unit/mL injection Inject 1.11 mLs (4,440 Units total) into the skin every Mon, Wed, Fri.      fluticasone propionate (FLONASE) 50 mcg/actuation nasal spray 2 sprays (100 mcg total) by Each Nostril route once daily.      gabapentin (NEURONTIN) 100 MG capsule Take 1 capsule (100 mg total) by mouth 2 (two) times daily.      hydrALAZINE (APRESOLINE) 50 MG tablet Take 1 tablet (50 mg total) by mouth every 8 (eight) hours. 90 tablet 0    insulin aspart U-100 (NOVOLOG FLEXPEN U-100 INSULIN) 100  "unit/mL (3 mL) InPn pen Inject 5-8 units 3 times per day with food. (Patient taking differently: Inject 5-8 Units into the skin 3 (three) times daily with meals. Inject 5-8 units 3 times per day with food.) 1 Box 6    insulin detemir U-100 (LEVEMIR FLEXTOUCH U-100 INSULN) 100 unit/mL (3 mL) InPn pen Inject 15 Units into the skin every evening.      lactulose (CHRONULAC) 10 gram/15 mL solution Take 20 g by mouth 2 (two) times a day.      losartan (COZAAR) 100 MG tablet Take 1 tablet (100 mg total) by mouth once daily.      ondansetron (ZOFRAN-ODT) 8 MG TbDL Take 1 tablet (8 mg total) by mouth every 8 (eight) hours as needed (nausea).      oxyCODONE (ROXICODONE) 5 MG immediate release tablet Take 1 tablet (5 mg total) by mouth every 6 (six) hours as needed for Pain.      polyethylene glycol (GLYCOLAX) 17 gram PwPk Take 17 g by mouth 2 (two) times daily as needed.      senna-docusate 8.6-50 mg (PERICOLACE) 8.6-50 mg per tablet Take 1 tablet by mouth 2 (two) times daily.      blood sugar diagnostic Strp To check BG 4 times daily, to use with insurance preferred meter (Patient taking differently: 1 strip by Misc.(Non-Drug; Combo Route) route 4 (four) times daily. To check BG 4 times daily, to use with insurance preferred meter) 450 strip 3    pen needle, diabetic (BD ULTRA-FINE ROBERT PEN NEEDLE) 32 gauge x 5/32" Ndle To use 4 times per day with insulin injections. (Patient taking differently: 1 pen by Misc.(Non-Drug; Combo Route) route 4 (four) times daily. To use 4 times per day with insulin injections.) 450 each 2    [DISCONTINUED] blood-glucose meter (ACCU-CHEK KAYLIE PLUS METER) Misc To use to check blood sugars 4 times a day 1 each 0    [DISCONTINUED] clorazepate (TRANXENE) 3.75 MG Tab Take 7.5 mg by mouth nightly as needed.       [DISCONTINUED] dextrose (GLUCOSE GEL) 40 % gel Take 37.5 mLs (15,000 mg total) by mouth once as needed (hypoglycemia). 37.5 g 4    [DISCONTINUED] ezetimibe (ZETIA) 10 mg tablet Take 1 tablet " (10 mg total) by mouth once daily.      [DISCONTINUED] fenofibrate 160 MG Tab Take 1 tablet (160 mg total) by mouth once daily. 90 tablet 3    [DISCONTINUED] lancing device with lancets (ACCU-CHEK SOFT DEV LANCETS) Kit To check blood sugars 4 times per day 400 each 3    [DISCONTINUED] pantoprazole (PROTONIX) 40 MG tablet Take 1 tablet (40 mg total) by mouth once daily.         Review of Systems:  Neg    Physical Exam:  Constitutional: nad, aao x 3  Heart: rrr, no m/r/g, no edema  Lungs: ctab, no w/r/r/c, no lb  Abdomen: s/nt/nd, +BS    Results:  Recent Labs   Lab 08/27/22  0538 08/28/22  0659 08/29/22  0520   * 136 132*   K 4.2 4.4 4.4    100 97   CO2 26 22* 21*   BUN 33* 47* 60*   CREATININE 5.0* 6.9* 7.6*   GLU 99 94 158*         Recent Labs   Lab 08/27/22 0538 08/28/22  0659 08/29/22  0520   CALCIUM 9.2 9.5 9.2   ALBUMIN 3.3* 3.5 3.4*   PHOS 3.7 4.4 4.8*   MG 2.1 2.2 2.2         Recent Labs   Lab 01/30/20  0705 03/10/22  0650   PTH, Intact 466.0 H 387.0 H         No results for input(s): POCTGLUCOSE in the last 168 hours.    Recent Labs   Lab 05/05/22  0320 05/29/22  1324 06/22/22  0618   Hemoglobin A1C 10.6 H 8.3 H 7.5 H         Recent Labs   Lab 08/27/22  0538 08/28/22  0659 08/29/22  0520   WBC 10.21 9.35 9.84   HGB 9.8* 10.4* 9.9*   HCT 31.8* 33.8* 32.3*    263 279   MCV 86 87 86   MCHC 30.8* 30.8* 30.7*   MONO 12.6  1.3* 11.1  1.0 10.4  1.0         Recent Labs   Lab 08/27/22  0538 08/28/22  0659 08/29/22  0520   BILITOT 0.6 0.6 0.5   PROT 6.9 7.5 7.3   ALBUMIN 3.3* 3.5 3.4*   ALKPHOS 64 68 80   ALT 8* 7* 10   AST 13 16 19         Recent Labs   Lab 05/29/22  0900 06/21/22  1612   Color, UA Yellow Yellow   Appearance, UA Clear Clear   pH, UA 8.0 >8.0 A   Specific [place], UA 1.015 1.010   Protein, UA 2+ A 3+ A   Glucose, UA 2+ A 2+ A   Ketones, UA Negative Negative   Urobilinogen, UA Negative Negative   Bilirubin (UA) Negative Negative   Occult Blood UA Negative Negative   Nitrite, UA  Negative Negative   RBC, UA 1 1   WBC, UA 5 2   Bacteria Rare Negative   Hyaline Casts, UA 0 12 A         Recent Labs   Lab 05/19/21  0300   POC PH 7.273 L   POC PCO2 47.8 H   POC HCO3 22.1 L   POC PO2 22 LL   POC SATURATED O2 30 L   POC BE -5   Sample VENOUS       I have spent > minutes providing care for this patient for the above diagnoses. These services have included chart/data/imaging review, evaluation, exam, formulation of plan, , note preparation, and discussions with staff involved in this patient's care.    Emily Overton NP    [facility]  [address] [place] [id number]  [phone] (p)  [phone] (f)

## 2022-08-29 NOTE — ANESTHESIA POSTPROCEDURE EVALUATION
Anesthesia Post Evaluation    Patient: Leanna García    Procedure(s) Performed: Procedure(s) (LRB):  CREATION, AV FISTULA (Left)    Final Anesthesia Type: general      Patient location during evaluation: PACU  Patient participation: Yes- Able to Participate  Level of consciousness: awake and alert  Post-procedure vital signs: reviewed and stable  Pain management: adequate  Airway patency: patent    PONV status at discharge: No PONV  Anesthetic complications: no      Cardiovascular status: stable  Respiratory status: unassisted and spontaneous ventilation  Hydration status: euvolemic  Follow-up not needed.          Vitals Value Taken Time   /55 08/29/22 1445   Temp 36.6 °C (97.8 °F) 08/29/22 1430   Pulse 74 08/29/22 1448   Resp 12 08/29/22 1448   SpO2 95 % 08/29/22 1448   Vitals shown include unvalidated device data.      Event Time   Out of Recovery 08/29/2022 14:50:11         Pain/Peña Score: Pain Rating Prior to Med Admin: 10 (8/29/2022  1:50 PM)  Pain Rating Post Med Admin: 3 (8/28/2022  6:46 PM)  Peña Score: 8 (8/29/2022  2:30 PM)

## 2022-08-29 NOTE — CLINICAL REVIEW
Chart check  Micro reviewed, anaerobic culture no growth.  Patient in the OR for HD dialysis port   Continue cefepime 1 g IV daily, patient on dialysis   Will follow

## 2022-08-29 NOTE — PROGRESS NOTES
[facility]  Progress Note    Patient name: Leanna García  MRN: [id number]  Admit Date: 8/18/2022   LOS: 9 days     SUBJECTIVE:     Principal problem: Diabetic ulcer of left midfoot associated with type 2 diabetes mellitus    Interval History:  No acute overnight events reported.  Patient seen in the postop recovery area after returning from left arm AV fistula repair.  She endorses pain of the left AV fistula site.  Denies nausea or vomiting.    Scheduled Meds:   amLODIPine  10 mg Oral Daily    aspirin  81 mg Oral Daily    atorvastatin  80 mg Oral Daily    calcium acetate(phosphat bind)  667 mg Oral TID WM    ceFEPime (MAXIPIME) IVPB  1 g Intravenous Q24H    clopidogreL  75 mg Oral Daily    epoetin chris-epbx  4,400 Units Subcutaneous Every Mon, Wed, Fri    heparin (porcine)  5,000 Units Subcutaneous Q8H    hydrALAZINE  50 mg Oral Q8H    insulin detemir U-100  15 Units Subcutaneous BID    losartan  100 mg Oral Daily    metoclopramide HCl  5 mg Oral QID (AC & HS)    mupirocin   Nasal BID    sodium zirconium cyclosilicate  10 g Oral Daily     Continuous Infusions:   alteplase       PRN Meds:acetaminophen, alteplase, diphenhydrAMINE, fentaNYL, heparin (porcine), hydrALAZINE, insulin aspart U-100, melatonin, meperidine, naloxone, ondansetron, ondansetron, oxyCODONE, oxyCODONE, senna-docusate 8.6-50 mg, sodium chloride 0.9%, sodium chloride 0.9%, sodium chloride 0.9%, sodium chloride 0.9%    Review of patient's allergies indicates:   Allergen Reactions    Dilaudid [hydromorphone] Nausea And Vomiting    Chlorhexidine Itching    Lortab [hydrocodone-acetaminophen] Itching       Review of Systems: As per interval history    OBJECTIVE:     Vital Signs (Most Recent)  Temp: 97.3 °F (36.3 °C) (08/29/22 0727)  Pulse: 66 (08/29/22 0727)  Resp: 18 (08/29/22 0727)  BP: (!) 162/72 (08/29/22 0950)  SpO2: (!) 94 % (08/29/22 0727)    Vital Signs Range (Last 24H):  Temp:  [97.3 °F (36.3 °C)-98 °F (36.7  °C)]   Pulse:  [66-77]   Resp:  [12-18]   BP: (139-163)/(69-82)   SpO2:  [94 %-99 %]     I & O (Last 24H):    Intake/Output Summary (Last 24 hours) at 8/29/2022 1352  Last data filed at 8/29/2022 1332  Gross per 24 hour   Intake 1310 ml   Output 250 ml   Net 1060 ml         Physical Exam:  General: Patient resting comfortably in no acute distress. Appears as stated age. Calm  Eyes: No conjunctival injection. No scleral icterus.  ENT: Hearing grossly intact. No discharge from ears. No nasal discharge.   CVS: RRR. No LE edema BL  Lungs:  No tachypnea or accessory muscle use.  Clear to auscultation bilaterally  Abdomen:  Soft, nontender and nondistended.  No organomegaly  Neuro:  Alert.  Follows commands.  Skin:  Left foot dressing - clean, dry and intact with wound VAC in place. Left AV fistula dressing is clean, dry and intact.  Laboratory:  I have reviewed all pertinent lab results within the past 24 hours.  CBC:   Recent Labs   Lab 08/29/22  0520   WBC 9.84   RBC 3.77*   HGB 9.9*   HCT 32.3*      MCV 86   MCH 26.3*   MCHC 30.7*       BMP:   Recent Labs   Lab 08/29/22  0520   *   *   K 4.4   CL 97   CO2 21*   BUN 60*   CREATININE 7.6*   CALCIUM 9.2   MG 2.2         Diagnostic Results:  Labs: Reviewed     ASSESSMENT/PLAN:     Active Hospital Problems    Diagnosis  POA    *Diabetic ulcer of left midfoot associated with type 2 diabetes mellitus [E11.621, L97.429]  Yes    Acute hematogenous osteomyelitis of left foot [M86.072]  Yes    Gastroparesis [K31.84]  Yes    Slow transit constipation [K59.01]  Yes    Ulcer of left foot with necrosis of muscle [L97.523]  Yes    PAD (peripheral artery disease) [I73.9]  Yes    Type 2 diabetes mellitus with kidney complication, with long-term current use of insulin [E11.29, Z79.4]  Not Applicable    ESRD (end stage renal disease) [N18.6]  Yes    Hyperlipemia [E78.5]  Yes    Hypertension associated with diabetes [E11.59, I15.2]  Yes     Chronic      Resolved  Hospital Problems    Diagnosis Date Resolved POA    Chest pain [R07.9] 08/19/2022 Yes         Plan:   Diabetic left foot ulcer with muscle necrosis - recently discharged from LTAC after receiving about 10 weeks of antibiotics over the course of last 3 months  Status post bedside excisional debridement of left midfoot on 8/19  MRI of the left forefoot revealed acute osteomyelitis involving great toe proximal and distal phalanges  Status post bone biopsy of the left hallux, excisional debridement ulceration of the left hallux and superficial debridement of the plantar foot on 08/24  Status post amputation 1st toe and partial 1st metatarsal head amputation left foot on 08/26   Cultures growing pansensitive Proteus mirabilis  Antibiotics de-escalated to cefepime.  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  Basal insulin along with low-dose insulin sliding scale.  Increase detemir to 15 units b.i.d.      VTE Risk Mitigation (From admission, onward)           Ordered     heparin (porcine) injection 4,000 Units  As needed (PRN)         08/26/22 1426     heparin (porcine) injection 5,000 Units  Every 8 hours         08/18/22 2311     IP VTE HIGH RISK PATIENT  Once         08/18/22 2311     Place sequential compression device  Until discontinued         08/18/22 2311                        Department Hospital Medicine  [facility]  Andrea Montoya MD  Date of service: 08/29/2022

## 2022-08-29 NOTE — CARE UPDATE
08/28/22 2051   Patient Assessment/Suction   Level of Consciousness (AVPU) alert   Respiratory Effort Normal;Unlabored   Expansion/Accessory Muscles/Retractions no use of accessory muscles   All Lung Fields Breath Sounds clear   PRE-TX-O2   O2 Device (Oxygen Therapy) room air   SpO2 98 %   Pulse Oximetry Type Intermittent   $ Pulse Oximetry - Multiple Charge Pulse Oximetry - Multiple   Pulse 76   Resp 12   Incentive Spirometer   $ Incentive Spirometer Charges done with encouragement   Administration (IS) mouthpiece utilized   Number of Repetitions (IS) 10   Level Incentive Spirometer (mL) 2000   Patient Tolerance (IS) good   Encourage incentive spirometer

## 2022-08-29 NOTE — PROGRESS NOTES
Pt with eyes closed making snoring sounds. VSS. No signs of discomfort noted or reported at this time

## 2022-08-29 NOTE — CONSULTS
Wound vac placed on left plantar foot wound measures 1.2x1.1x0.4cm  undermining 0.4cm at the 9 oclock position red wound bed with a small area of yellow slough. Periwound redness  Cleaned and dried and wound vac resumed at -125 megahertz.  Cleaned around incision and foot, heel.  Dried well, great toe amputation sited, incision 7.2cm periwound redness no active drainage covered with optifoam, weaved gauze thru toes, black dry heel, painted with betadine.  Covered with abd pad, wrapped lightly with kerlix, elevated on pillows.

## 2022-08-29 NOTE — TRANSFER OF CARE
"Anesthesia Transfer of Care Note    Patient: Leanna García    Procedure(s) Performed: Procedure(s) (LRB):  CREATION, AV FISTULA (Left)    Patient location: PACU    Anesthesia Type: general    Transport from OR: Transported from OR on room air with adequate spontaneous ventilation    Post pain: adequate analgesia    Post assessment: no apparent anesthetic complications    Post vital signs: stable    Level of consciousness: awake and alert    Nausea/Vomiting: no nausea/vomiting    Complications: none    Transfer of care protocol was followed      Last vitals:   Visit Vitals  BP (!) 162/72   Pulse 66   Temp 36.3 °C (97.3 °F) (Oral)   Resp 18   Ht 5' 9" (1.753 m)   Wt 85.7 kg (188 lb 15 oz)   SpO2 (!) 94%   BMI 27.90 kg/m²     "

## 2022-08-30 ENCOUNTER — TELEPHONE (OUTPATIENT)
Dept: FAMILY MEDICINE | Facility: CLINIC | Age: 57
End: 2022-08-30
Payer: MEDICAID

## 2022-08-30 VITALS
WEIGHT: 188.94 LBS | DIASTOLIC BLOOD PRESSURE: 88 MMHG | SYSTOLIC BLOOD PRESSURE: 152 MMHG | BODY MASS INDEX: 27.98 KG/M2 | HEIGHT: 69 IN | OXYGEN SATURATION: 99 % | TEMPERATURE: 98 F | RESPIRATION RATE: 18 BRPM | HEART RATE: 83 BPM

## 2022-08-30 PROBLEM — L97.411 DIABETIC ULCER OF RIGHT MIDFOOT ASSOCIATED WITH TYPE 2 DIABETES MELLITUS, LIMITED TO BREAKDOWN OF SKIN: Status: ACTIVE | Noted: 2022-08-30

## 2022-08-30 PROBLEM — R07.2 PRECORDIAL PAIN: Status: ACTIVE | Noted: 2022-08-30

## 2022-08-30 PROBLEM — E11.621 DIABETIC ULCER OF RIGHT MIDFOOT ASSOCIATED WITH TYPE 2 DIABETES MELLITUS, LIMITED TO BREAKDOWN OF SKIN: Status: ACTIVE | Noted: 2022-08-30

## 2022-08-30 LAB
ALBUMIN SERPL BCP-MCNC: 3.1 G/DL (ref 3.5–5.2)
ALP SERPL-CCNC: 64 U/L (ref 55–135)
ALT SERPL W/O P-5'-P-CCNC: 9 U/L (ref 10–44)
ANION GAP SERPL CALC-SCNC: 10 MMOL/L (ref 8–16)
AST SERPL-CCNC: 14 U/L (ref 10–40)
BASOPHILS # BLD AUTO: 0.05 K/UL (ref 0–0.2)
BASOPHILS NFR BLD: 0.5 % (ref 0–1.9)
BILIRUB SERPL-MCNC: 0.5 MG/DL (ref 0.1–1)
BUN SERPL-MCNC: 32 MG/DL (ref 6–20)
CALCIUM SERPL-MCNC: 8.6 MG/DL (ref 8.7–10.5)
CHLORIDE SERPL-SCNC: 97 MMOL/L (ref 95–110)
CO2 SERPL-SCNC: 25 MMOL/L (ref 23–29)
CREAT SERPL-MCNC: 4.9 MG/DL (ref 0.5–1.4)
DIFFERENTIAL METHOD: ABNORMAL
EOSINOPHIL # BLD AUTO: 0.3 K/UL (ref 0–0.5)
EOSINOPHIL NFR BLD: 2.8 % (ref 0–8)
ERYTHROCYTE [DISTWIDTH] IN BLOOD BY AUTOMATED COUNT: 16 % (ref 11.5–14.5)
EST. GFR  (NO RACE VARIABLE): 9.8 ML/MIN/1.73 M^2
GLUCOSE SERPL-MCNC: 153 MG/DL (ref 70–110)
GLUCOSE SERPL-MCNC: 154 MG/DL (ref 70–110)
GLUCOSE SERPL-MCNC: 154 MG/DL (ref 70–110)
HCT VFR BLD AUTO: 27.3 % (ref 37–48.5)
HGB BLD-MCNC: 8.6 G/DL (ref 12–16)
IMM GRANULOCYTES # BLD AUTO: 0.13 K/UL (ref 0–0.04)
IMM GRANULOCYTES NFR BLD AUTO: 1.3 % (ref 0–0.5)
LYMPHOCYTES # BLD AUTO: 1.9 K/UL (ref 1–4.8)
LYMPHOCYTES NFR BLD: 19.1 % (ref 18–48)
MAGNESIUM SERPL-MCNC: 2.2 MG/DL (ref 1.6–2.6)
MCH RBC QN AUTO: 27.2 PG (ref 27–31)
MCHC RBC AUTO-ENTMCNC: 31.5 G/DL (ref 32–36)
MCV RBC AUTO: 86 FL (ref 82–98)
MONOCYTES # BLD AUTO: 1 K/UL (ref 0.3–1)
MONOCYTES NFR BLD: 10 % (ref 4–15)
NEUTROPHILS # BLD AUTO: 6.6 K/UL (ref 1.8–7.7)
NEUTROPHILS NFR BLD: 66.3 % (ref 38–73)
NRBC BLD-RTO: 0 /100 WBC
PHOSPHATE SERPL-MCNC: 4.3 MG/DL (ref 2.7–4.5)
PLATELET # BLD AUTO: 226 K/UL (ref 150–450)
PMV BLD AUTO: 10.4 FL (ref 9.2–12.9)
POTASSIUM SERPL-SCNC: 4.2 MMOL/L (ref 3.5–5.1)
PROT SERPL-MCNC: 6.8 G/DL (ref 6–8.4)
RBC # BLD AUTO: 3.16 M/UL (ref 4–5.4)
SODIUM SERPL-SCNC: 132 MMOL/L (ref 136–145)
WBC # BLD AUTO: 9.9 K/UL (ref 3.9–12.7)

## 2022-08-30 PROCEDURE — 99232 PR SUBSEQUENT HOSPITAL CARE,LEVL II: ICD-10-PCS | Mod: ,,, | Performed by: STUDENT IN AN ORGANIZED HEALTH CARE EDUCATION/TRAINING PROGRAM

## 2022-08-30 PROCEDURE — 99232 SBSQ HOSP IP/OBS MODERATE 35: CPT | Mod: ,,, | Performed by: STUDENT IN AN ORGANIZED HEALTH CARE EDUCATION/TRAINING PROGRAM

## 2022-08-30 PROCEDURE — 84100 ASSAY OF PHOSPHORUS: CPT | Performed by: NURSE PRACTITIONER

## 2022-08-30 PROCEDURE — 99232 SBSQ HOSP IP/OBS MODERATE 35: CPT | Mod: 24,,, | Performed by: PODIATRIST

## 2022-08-30 PROCEDURE — 25000003 PHARM REV CODE 250: Performed by: INTERNAL MEDICINE

## 2022-08-30 PROCEDURE — 83735 ASSAY OF MAGNESIUM: CPT | Performed by: NURSE PRACTITIONER

## 2022-08-30 PROCEDURE — 80053 COMPREHEN METABOLIC PANEL: CPT | Performed by: NURSE PRACTITIONER

## 2022-08-30 PROCEDURE — 36415 COLL VENOUS BLD VENIPUNCTURE: CPT | Performed by: NURSE PRACTITIONER

## 2022-08-30 PROCEDURE — 85025 COMPLETE CBC W/AUTO DIFF WBC: CPT | Performed by: NURSE PRACTITIONER

## 2022-08-30 PROCEDURE — 25000003 PHARM REV CODE 250: Performed by: NURSE PRACTITIONER

## 2022-08-30 PROCEDURE — 99232 PR SUBSEQUENT HOSPITAL CARE,LEVL II: ICD-10-PCS | Mod: 24,,, | Performed by: PODIATRIST

## 2022-08-30 PROCEDURE — 63600175 PHARM REV CODE 636 W HCPCS: Performed by: NURSE PRACTITIONER

## 2022-08-30 PROCEDURE — 25000003 PHARM REV CODE 250: Performed by: STUDENT IN AN ORGANIZED HEALTH CARE EDUCATION/TRAINING PROGRAM

## 2022-08-30 RX ORDER — CEFEPIME HYDROCHLORIDE 1 G/50ML
2 INJECTION, SOLUTION INTRAVENOUS
Start: 2022-08-31 | End: 2022-09-10

## 2022-08-30 RX ORDER — INSULIN DETEMIR 100 [IU]/ML
15 INJECTION, SOLUTION SUBCUTANEOUS 2 TIMES DAILY
Refills: 0
Start: 2022-08-30 | End: 2022-09-15 | Stop reason: SDUPTHER

## 2022-08-30 RX ADMIN — OXYCODONE HYDROCHLORIDE 5 MG: 5 TABLET ORAL at 06:08

## 2022-08-30 RX ADMIN — METOCLOPRAMIDE 5 MG: 5 TABLET ORAL at 12:08

## 2022-08-30 RX ADMIN — ACETAMINOPHEN 650 MG: 325 TABLET ORAL at 01:08

## 2022-08-30 RX ADMIN — CALCIUM ACETATE 667 MG: 667 CAPSULE ORAL at 12:08

## 2022-08-30 RX ADMIN — CALCIUM ACETATE 667 MG: 667 CAPSULE ORAL at 07:08

## 2022-08-30 RX ADMIN — ASPIRIN 81 MG: 81 TABLET, COATED ORAL at 08:08

## 2022-08-30 RX ADMIN — SODIUM ZIRCONIUM CYCLOSILICATE 10 G: 5 POWDER, FOR SUSPENSION ORAL at 08:08

## 2022-08-30 RX ADMIN — CLOPIDOGREL BISULFATE 75 MG: 75 TABLET, FILM COATED ORAL at 08:08

## 2022-08-30 RX ADMIN — HEPARIN SODIUM 5000 UNITS: 5000 INJECTION INTRAVENOUS; SUBCUTANEOUS at 06:08

## 2022-08-30 RX ADMIN — ATORVASTATIN CALCIUM 80 MG: 40 TABLET, FILM COATED ORAL at 08:08

## 2022-08-30 RX ADMIN — METOCLOPRAMIDE 5 MG: 5 TABLET ORAL at 06:08

## 2022-08-30 RX ADMIN — HYDRALAZINE HYDROCHLORIDE 50 MG: 25 TABLET ORAL at 06:08

## 2022-08-30 NOTE — TELEPHONE ENCOUNTER
----- Message from Tal Vera Patient Care Assistant sent at 8/30/2022  1:45 PM CDT -----  Contact: Pt  Type:  Sooner Appointment Request    Caller is requesting a sooner appointment.  Caller declined first available appointment listed below.  Caller will not accept being placed on the waitlist and is requesting a message be sent to doctor.    Name of Caller:  Pt  When is the first available appointment?  09/09/2022  Symptoms:  Hospital Follow Up  Best Call Back Number:  [phone]  Additional Information:  Pt is calling to schedule a HFU appt within 1 week. Please call back and advise

## 2022-08-30 NOTE — PROGRESS NOTES
08/29/22 1920   Handoff Report   Received From ABI Gomez   Given To ABI Davenport   Vital Signs   Temp 98 °F (36.7 °C)   Pulse 79   Resp 18   SpO2 95 %   BP (!) 148/66   Post-Hemodialysis Assessment   Rinseback Volume (mL) 250 mL   Blood Volume Processed (Liters) 63.9 L   Dialyzer Clearance Lightly streaked   Additional Fluid Intake (mL) 250 mL   Total UF (mL) 2000 mL   Net Fluid Removal 1500   Patient Response to Treatment tolerated well   Post-Hemodialysis Comments tx complete, reinfused, no blood loss noted

## 2022-08-30 NOTE — ASSESSMENT & PLAN NOTE
Continue wound VAC changes twice weekly.    Continue Betadine in additional padding to heel wound.      Emmie

## 2022-08-30 NOTE — HOSPITAL COURSE
57-year-old  female with multiple medical comorbidities including but not limited to type 2 diabetes mellitus complicated by diabetic foot ulcer, ESRD on hemodialysis, hyperlipidemia presented to the ED with nausea and vomiting as well as redness involving the right foot wound.    Diagnosed with diabetic gastroparesis and symptoms resolved with metoclopramide.  She was recently discharged after prolonged LTAC stay for IV antibiotics of the left diabetic foot ulcer.  She was evaluated by Podiatry.  She underwent bedside excision debridement of the left midfoot on 08/19.  MRI revealed acute osteomyelitis of the left great toe involving proximal and distal phalanges.  This was followed by bone biopsy of the left hallux and excisional debridement with cultures growing Proteus mirabilis.  Eventually patient underwent amputation of the 1st toe and partial 1st metatarsal head amputation 8/26.  Seen by Infectious Disease.  Initially on broad-spectrum antibiotics and eventually de-escalated to intravenous cefepime which she will continue for 2 more weeks.    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  Fistulogram revealed large thrombus with aneurysms.  Underwent fistula repair in the OR.    Stable for discharge to home with resumption of home health.  Wound care orders sent.  Patient aware to follow-up with PCP, Podiatry and ID.  Discharge instructions and return precautions reviewed.    I have seen the patient on the day of discharge and reviewed the discharge instructions as outlined below. Patient verbalized understanding and is aware to contact primary care physician or return to ED if new or worsening symptoms.

## 2022-08-30 NOTE — PLAN OF CARE
Problem: Adult Inpatient Plan of Care  Goal: Absence of Hospital-Acquired Illness or Injury  Outcome: Ongoing, Progressing     Problem: Diabetes Comorbidity  Goal: Blood Glucose Level Within Targeted Range  Outcome: Ongoing, Progressing

## 2022-08-30 NOTE — NURSING
0700: report received, in bed asleep, no distress, easily awaken  0830: MD at bedside and did dressing change  1000: in room sitting on side of bed, no complaints  1230: wound care nurse changed vac to home vac  1410: removed saline lock, went over all discharge instructions, answered all questions, waiting on  to bring clothes and .

## 2022-08-30 NOTE — PROGRESS NOTES
ID Consult    Reason: left great toe wound    INTERVAL HISTORY:  8.22: Toe xray was worrisome for osteomyelitis and MRI confirmed it. Dr. Ponce willing to do a bone biopsy.   8/23: plans for bone biopsy noted. Wound care notes and images reviewed.   8/24:  Status post bone biopsy with findings of soft, osteomyelitic bone and an open joint.  Dr. Ponce recommends amputation of the toe and I discussed this with the patient and her  and they are in agreement.  Vascular notes reviewed with placement of a new tunneled dialysis catheter and anticipation of a revision of her left arm AV fistula.  I do not know when this is scheduled.  8/25: interim reviewed. Tissue cultures from left great toe are growing a GNR, to be identified, like a proteus. She had some vomiting this morning after taking a pain pill on an empty stomach. She is interested in resuming physical therapy.   8/26: Interim reviewed status post amputation of the left great toe today.  Bone biopsy from 08/24 has Proteus, sensitive to all the drugs tested. Seen in dialysis.     8/29-30:  Interim reviewed, patient yesterday in the OR all day for AV fistula placement left arm, successful, was at dialysis in the afternoon  She is very eager to go home today.  Has no acute complaints.    EXAM & DIAGNOSTICS REVIEWED:   Vitals:     Temp:  [97.5 °F (36.4 °C)-98.7 °F (37.1 °C)]   Temp: 97.6 °F (36.4 °C) (08/30/22 1152)  Pulse: 83 (08/30/22 1152)  Resp: 18 (08/30/22 1152)  BP: (!) 152/88 (08/30/22 1152)  SpO2: 99 % (08/30/22 1152)    Intake/Output Summary (Last 24 hours) at 8/30/2022 1155  Last data filed at 8/29/2022 1920  Gross per 24 hour   Intake 1300 ml   Output 2250 ml   Net -950 ml            8/20 7/25 8/21  As best I can recall, her distal great toe has been erythematous but it is more so now  8/22: wound vac not disturbed. Tearful about left arm graft clotting  8/23 small bone is visible and palpable      8/24:  Bandage is not  disturbed  8/25: bandage not disturbed. The right sided tunneled cath is bruised and puffy. Left arm remains swollen    8/30:             .    General Labs reviewed:  Recent Labs   Lab 08/28/22  0659 08/29/22  0520 08/30/22  0514   WBC 9.35 9.84 9.90   HGB 10.4* 9.9* 8.6*   HCT 33.8* 32.3* 27.3*    279 226       Recent Labs   Lab 08/28/22  0659 08/29/22  0520 08/30/22  0514    132* 132*   K 4.4 4.4 4.2    97 97   CO2 22* 21* 25   BUN 47* 60* 32*   CREATININE 6.9* 7.6* 4.9*   CALCIUM 9.5 9.2 8.6*   PROT 7.5 7.3 6.8   BILITOT 0.6 0.5 0.5   ALKPHOS 68 80 64   ALT 7* 10 9*   AST 16 19 14     No results for input(s): CRP in the last 168 hours.      Micro:  Microbiology Results (last 7 days)       Procedure Component Value Units Date/Time    Culture, Anaerobic [[id number]] Collected: 08/24/22 1310    Order Status: Completed Specimen: Wound from Toe, Left Foot Updated: 08/27/22 0954     Anaerobic Culture No anaerobes isolated    Narrative:      Left great toe    Culture, Anaerobic [[id number]] Collected: 08/24/22 1310    Order Status: Completed Specimen: Tissue from Toe, Left Foot Updated: 08/27/22 0953     Anaerobic Culture No anaerobes isolated    Narrative:      SOFT TISSUE, LEFT GREAT TOE    Culture, Anaerobic [[id number]] Collected: 08/24/22 1310    Order Status: Completed Specimen: Bone from Toe, Left Foot Updated: 08/27/22 0952     Anaerobic Culture No anaerobes isolated    Narrative:       left great toe    AFB Culture & Smear [[id number]] Collected: 08/24/22 1310    Order Status: Completed Specimen: Wound from Toe, Left Foot Updated: 08/26/22 1536     AFB CULTURE STAIN No acid fast bacilli seen.     AFB CULTURE STAIN Testing performed by:     AFB CULTURE STAIN Lab [facility]     [facility] [address] [facility]     [place] [address]     AFB CULTURE STAIN Dr.Brian John MD    Narrative:      Left great toe    AFB Culture & Smear [[id number]] Collected: 08/24/22  1310    Order Status: Completed Specimen: Tissue from Toe, Left Foot Updated: 08/26/22 1535     AFB CULTURE STAIN No acid fast bacilli seen.     AFB CULTURE STAIN Testing performed by:     AFB CULTURE STAIN Lab [facility]     AFB CULTURE STAIN [address] [facility]     [place] [address]     AFB CULTURE STAIN Dr.Brian John MD    Narrative:      SOFT TISSUE, LEFT GREAT TOE    AFB Culture & Smear [[id number]] Collected: 08/24/22 1310    Order Status: Completed Specimen: Bone from Toe, Left Foot Updated: 08/26/22 1535     AFB CULTURE STAIN No acid fast bacilli seen.     AFB CULTURE STAIN Testing performed by:     AFB CULTURE STAIN Lab [facility]     AFB CULTURE STAIN [address] [facility]     [place] [address]     AFB CULTURE STAIN Dr.Brian John MD    Narrative:       Left great toe    Tissue culture [[id number]]  (Abnormal) Collected: 08/24/22 1310    Order Status: Completed Specimen: Bone Updated: 08/26/22 1407     Aerobic Culture - Tissue PROTEUS MIRABILIS  Moderate  For susceptibility see order #[id number]       Gram Stain Result Rare WBC's      Rare Gram negative rods    Narrative:      left great toe    Tissue culture [[id number]]  (Abnormal) Collected: 08/24/22 1310    Order Status: Completed Specimen: Tissue Updated: 08/26/22 0931     Aerobic Culture - Tissue PROTEUS MIRABILIS  Moderate  For susceptibility see order #[id number]       Gram Stain Result No WBC's      No organisms seen    Narrative:      SOFT TISSUE, LEFT GREAT TOE    Aerobic culture [[id number]]  (Abnormal)  (Susceptibility) Collected: 08/24/22 1310    Order Status: Completed Specimen: Wound from Toe, Left Foot Updated: 08/26/22 0858     Aerobic Bacterial Culture PROTEUS MIRABILIS  Moderate      Narrative:      Left great toe    Gram stain [[id number]] Collected: 08/24/22 1310    Order Status: Completed Specimen: Wound from Toe, Left Foot Updated: 08/24/22 2014     Gram Stain Result  Rare WBC's      Few Gram negative rods      Rare Gram positive cocci    Narrative:      Left great toe    Fungus culture [[id number]] Collected: 08/24/22 1310    Order Status: Sent Specimen: Wound from Toe, Left Foot Updated: 08/24/22 1348    Fungus culture [[id number]] Collected: 08/24/22 1310    Order Status: Sent Specimen: Wound from Toe, Left Foot Updated: 08/24/22 1347    Fungus culture [[id number]] Collected: 08/24/22 1310    Order Status: Sent Specimen: Wound from Toe, Left Foot Updated: 08/24/22 1345    Aerobic culture [[id number]] Collected: 08/24/22 1310    Order Status: Canceled Specimen: Bone from Toe, Left Foot     Gram stain [[id number]] Collected: 08/24/22 1310    Order Status: Canceled Specimen: Wound from Toe, Left Foot     Aerobic culture [[id number]] Collected: 08/24/22 1310    Order Status: Canceled Specimen: Tissue from Toe, Left Foot     Gram stain [[id number]] Collected: 08/24/22 1310    Order Status: Canceled Specimen: Wound from Toe, Left Foot             Imaging Reviewed:    foot xray:  Lucency at the level of the proximal aspect of the distal great toe that is similar in character as compared to previous attributed towards the flexion position of extremity digit though less conspicuous for osteomyelitis. Would advise repeat assessment with MRI of the forefoot region with contrast for optimal evaluation.    MRI of left forefoot  Acute osteomyelitis involving great toe proximal and distal phalanges.        ASSESSMENT:  Left diabetic foot ulcer, osteomyelitis of left great toe, with exposed soft bone and joint   Wound culture 8/24 grew pan sensitive Proteus mirabilis   PAD with poor healing  ESRD with clotted fistula  DM with multiple complications    PLAN:  Upon discharge, cefepime 2 g-2 g-2 g IV after each dialysis for at least 2 weeks, estimated end date 9/7/22  Monitor CBC with differential, CMP, CRP weekly while on antibiotics   Follow up ID clinic in 3-4 weeks  Wound care  Tight glucose  control    Will sign-off, call us back with any questions     D/w patient, nursing, Dr Montoya

## 2022-08-30 NOTE — OP NOTE
[facility]  Vascular Surgery  Operative Note    SUMMARY     Date of Procedure: 8/29/2022     Procedure:   Revision and thrombectomy left arm arteriovenous fistula    Surgeon(s) and Role:     * Ali Khoobehi, MD - Primary    Assisting Surgeon: None    Pre-Operative Diagnosis: Diabetes mellitus due to underlying condition with foot ulcer, without long-term current use of insulin [E08.621, L97.509]  Ulcer of right heel, limited to breakdown of skin [L97.411]    Post-Operative Diagnosis: Post-Op Diagnosis Codes:     * Diabetes mellitus due to underlying condition with foot ulcer, without long-term current use of insulin [E08.621, L97.509]     * Ulcer of right heel, limited to breakdown of skin [L97.411]    Anesthesia: General    Operative Findings (including complications, if any): thrombus avf    Description of Technical Procedures:   Indications, risks, benefits of left arm thrombectomy, revision were discussed with the patient. Risks discussed included possible bleeding, access failure, infection, cardiac arrest, need for future interventions, need for permcath, pulmonary embolism, and death. Patient was agreeable for the procedure and signed consent.    Incision was made overlying the two large thrombosed venous aneurysm. Aneurysm was dissected out from surrounding tissue. Vessel was controlled proximally and distally.    Venous aneurysm was opened and extensive thrombus evacuated.    5 Obie catheter was passed distally through the arterial anastomoses. There is strong inflow.    Obie was passed proximally and more thrombus was evacuated from the outflow tract. There is excellent backbleeding.    Aneurysm sacs were cut down to shrink the sac size. Vein was closed with running 5-0 Prolene suture. Upon completion there is an excellent thrill through the now minimally aneurysmal fistula, indicating patency of the AVF.    Skin was closed with 3-0 Vicryl and staples. Dressing was applied. Pt was  brought to PACU in stable condition.    Significant Surgical Tasks Conducted by the Assistant(s), if Applicable: n/a    Estimated Blood Loss (EBL): 100 mL           Implants: * No implants in log *    Specimens:   Specimen (24h ago, onward)       Start     Ordered    08/29/22 1231  Specimen to Pathology - Surgery  Once        Comments: Pre-op Diagnosis: Diabetes mellitus due to underlying condition with foot ulcer, without long-term current use of insulin [E08.621, L97.509]Ulcer of right heel, limited to breakdown of skin [L97.411]Procedure(s):CREATION, AV FISTULA Number of specimens: 1Name of specimens: thrombus     Question:  Release to patient  Answer:  Immediate    08/29/22 1233                            Condition: Good    Disposition: PACU - hemodynamically stable.    Attestation: I performed the procedure.

## 2022-08-30 NOTE — PROGRESS NOTES
"INPATIENT NEPHROLOGY Progress Note   [facility]    Patient Name: Leanna García  Date: 08/30/2022    Reason for consultation: ESRD    Chief Complaint:   Chief Complaint   Patient presents with    Wound Infection     Diabetic pt with a right foot wound that looks to be stage II with mild redness. Pt is concerned and wanted to get it looked at "before it got too bad". Pt currently is being seen by woundcare for her other foot, presented with a wound vac.      History of Present Illness:  Leanna García is a 57 y.o. female with a history as  has a past medical history of A-fib, Anemia due to end stage renal disease (6/30/2022), Arthritis, Bronchitis, Cardiovascular event risk, ASCVD 10-year risk 6.7% (10/15/2016), Diabetes mellitus type II, Diabetic foot infection, Diabetic ulcer of left midfoot (05/05/2022), Disorder of kidney and ureter, Encounter for blood transfusion, ESRD (end stage renal disease) (05/18/2018), MANJINDER (generalized anxiety disorder) (10/25/2016), History of colon polyps (11/02/2016), Hyperlipidemia, Hypertension, and Stroke. who presented to the ED with a Wound Infection (Diabetic pt with a right foot wound that looks to be stage II with mild redness. Pt is concerned and wanted to get it looked at "before it got too bad". Pt currently is being seen by woundcare for her other foot, presented with a wound vac. )     Patient presented to the emergency for evaluation of a right foot wound check. She reports reports while she was waiting she ate a salad. Further states once she got to the ED room, she was given IV ABX and was about to be discharged when she became nauseated and started vomiting. Per the ED, she was given IV zosyn with plans to discharge.  She was then given antiemetics for the NV, but was uncontrolled. Emesis looked like undigested food, so she was then treated with Reglan. ED provider also reports patient then complained of chest pain and abdominal pain.  Patient " "reports generalized CP 20/10 PRS as describes as "feel like i'm being beating up in chest." She further states the chest pain is going all over into my stomach. While at bedside, patient started to force herself to gagged and vomit. Will admit for symptom management with antiemetics. Consult Nephrology for ESRD. Consult Dr. Bruno for diabetic foot wound.        Lab and imaging obtained and reviewed.   CBC shows hemoglobin 11.5 MCH 26.5 MCHC 30.6 RDW 15.4 lymph% 17.7.  CMP shows sodium 135 BUN 54 creatinine 6.2 GFR 7.4 glucose 233 total protein 8.6.  Chest x-ray without acute cardiopulmonary findings. XR right foot shows Degenerative changes of the foot with no acute osseous abnormality observed.     CT abdomen pelvis  IMPRESSION:   1. Incidental parapelvic gallstones.  2. Stable perinephric stranding.  3. Questionable destructive changes of the L5-S1 disc space. If an occult infection is soft tissue consider MR imaging pre and postcontrast to assess this further. This is not changed however significantly since 5/4/2022     Interval History:  8/19- thinks she has food poisoning, Dr. Bruno looking at foot wounds  8/20- wound vac replacement today, discharge per hospitalist and podiatry  8/21- had debridement and wound vac placement yest- discharge per hospitalist and podiatry  8/22 VSS. HD today. Multiple nurses unable to needle he AVF today, clots coming out, good thrill and bruit. Was working OK on Friday. Will get US AVF and attempt again in AM. DO not discharge her.  8/23 VSS. AVF US shows nonocclusive clot - we will attempt to needle and run her today. If unsuccessful - will ask Vascular to see her. Add TID Lokelma for now, K is 5.    Addendum @ 10:43 AM Access not working, will consult Vascular.    8/24 VSS. Awaiting vascular intervention on clotted HD access. K is controlled. HD later today or when access is re-established.  8/25  Seen on dialysis, debbi well.  Scheduled for L gr toe removal tomorrow.  VSS, Hgb " 10.  8/26 VSS. S/p amputation 1st toe and partial 1st metatarsal head amputation left foot by Dr. Ponce on 8/25. Seen on HD today. Change Lokelma to daily. Plan for AVF repair on Monday next week per Dr. Khoobehi.  8/27 VSS. HD on Monday. Lokelma for now.  8/28 VSS. HD tomorrow via HD line. AVF repair by Dr. Khoobehi in AM.  8/29  lab results reviewed, VSS.  No complaints.  AVF repair to be done today.  8/30  revision and thrombectomy of AVF done yesterday, pt had dialysis afterwards.  Discharging to home today w/home health, ok from renal standpoint.  She will f/u with her out patient unit for HD tomorrow.  No complaints.    Plan of Care:    ESRD on HD MWF  HTN  Hyponatremia  Acidosis  Secondary HPT  Anemia of CKD  AVF with non-occlusive clot on AVF US 8/22, plan for repair before dc    Plan:    - continue HD MWF  - continue home BP meds- adjust UF goal as needed  - renal diet, 1.5L fluid restriction, Lokelma daily for now  - adjust dialysate as needed  - continue binder with meals as tolerated  - continue FERMÍN with HDas needed  - plan for aneurysmal AVF repair and colateral therapy by Dr. Khoobehi this coming week    Thank you for allowing us to participate in this patient's care. We will continue to follow.    Vital Signs:  Temp Readings from Last 3 Encounters:   08/30/22 98.7 °F (37.1 °C) (Oral)   08/12/22 98 °F (36.7 °C)   07/29/22 96.7 °F (35.9 °C) (Axillary)       Pulse Readings from Last 3 Encounters:   08/30/22 79   08/12/22 66   07/29/22 75       BP Readings from Last 3 Encounters:   08/30/22 (!) 108/54   08/12/22 (!) 182/79   07/29/22 132/89       Weight:  Wt Readings from Last 3 Encounters:   08/21/22 85.7 kg (188 lb 15 oz)   08/07/22 87.3 kg (192 lb 7.4 oz)   07/24/22 78.9 kg (173 lb 14.4 oz)     Medications:  Scheduled Meds:   amLODIPine  10 mg Oral Daily    aspirin  81 mg Oral Daily    atorvastatin  80 mg Oral Daily    calcium acetate(phosphat bind)  667 mg Oral TID WM    ceFEPime (MAXIPIME) IVPB  1  g Intravenous Q24H    clopidogreL  75 mg Oral Daily    epoetin chris-epbx  4,400 Units Subcutaneous Every Mon, Wed, Fri    heparin (porcine)  5,000 Units Subcutaneous Q8H    hydrALAZINE  50 mg Oral Q8H    insulin detemir U-100  15 Units Subcutaneous BID    losartan  100 mg Oral Daily    metoclopramide HCl  5 mg Oral QID (AC & HS)    sodium zirconium cyclosilicate  10 g Oral Daily     Continuous Infusions:   alteplase       PRN Meds:.acetaminophen, alteplase, heparin (porcine), hydrALAZINE, insulin aspart U-100, melatonin, naloxone, ondansetron, oxyCODONE, senna-docusate 8.6-50 mg, sodium chloride 0.9%, sodium chloride 0.9%, sodium chloride 0.9%, sodium chloride 0.9%  No current facility-administered medications on file prior to encounter.     Current Outpatient Medications on File Prior to Encounter   Medication Sig Dispense Refill    amLODIPine (NORVASC) 10 MG tablet Take 1 tablet (10 mg total) by mouth once daily.      aspirin (ECOTRIN) 81 MG EC tablet Take 81 mg by mouth once daily.      atorvastatin (LIPITOR) 80 MG tablet Take 1 tablet (80 mg total) by mouth once daily. 90 tablet 3    azelastine (ASTELIN) 137 mcg (0.1 %) nasal spray 1 spray (137 mcg total) by Nasal route 2 (two) times a day. 30 mL 0    calcium acetate,phosphat bind, (PHOSLO) 667 mg capsule Take 1 capsule (667 mg total) by mouth 3 (three) times daily with meals.      cetirizine (ZYRTEC) 10 MG tablet Take 1 tablet (10 mg total) by mouth every other day.      clopidogreL (PLAVIX) 75 mg tablet Take 1 tablet (75 mg total) by mouth once daily. 30 tablet 0    epoetin chris-epbx (RETACRIT) 4,000 unit/mL injection Inject 1.11 mLs (4,440 Units total) into the skin every Mon, Wed, Fri.      fluticasone propionate (FLONASE) 50 mcg/actuation nasal spray 2 sprays (100 mcg total) by Each Nostril route once daily.      gabapentin (NEURONTIN) 100 MG capsule Take 1 capsule (100 mg total) by mouth 2 (two) times daily.      hydrALAZINE (APRESOLINE) 50 MG tablet Take  "1 tablet (50 mg total) by mouth every 8 (eight) hours. 90 tablet 0    insulin aspart U-100 (NOVOLOG FLEXPEN U-100 INSULIN) 100 unit/mL (3 mL) InPn pen Inject 5-8 units 3 times per day with food. (Patient taking differently: Inject 5-8 Units into the skin 3 (three) times daily with meals. Inject 5-8 units 3 times per day with food.) 1 Box 6    insulin detemir U-100 (LEVEMIR FLEXTOUCH U-100 INSULN) 100 unit/mL (3 mL) InPn pen Inject 15 Units into the skin every evening.      lactulose (CHRONULAC) 10 gram/15 mL solution Take 20 g by mouth 2 (two) times a day.      losartan (COZAAR) 100 MG tablet Take 1 tablet (100 mg total) by mouth once daily.      ondansetron (ZOFRAN-ODT) 8 MG TbDL Take 1 tablet (8 mg total) by mouth every 8 (eight) hours as needed (nausea).      oxyCODONE (ROXICODONE) 5 MG immediate release tablet Take 1 tablet (5 mg total) by mouth every 6 (six) hours as needed for Pain.      polyethylene glycol (GLYCOLAX) 17 gram PwPk Take 17 g by mouth 2 (two) times daily as needed.      senna-docusate 8.6-50 mg (PERICOLACE) 8.6-50 mg per tablet Take 1 tablet by mouth 2 (two) times daily.      blood sugar diagnostic Strp To check BG 4 times daily, to use with insurance preferred meter (Patient taking differently: 1 strip by Misc.(Non-Drug; Combo Route) route 4 (four) times daily. To check BG 4 times daily, to use with insurance preferred meter) 450 strip 3    pen needle, diabetic (BD ULTRA-FINE ROBERT PEN NEEDLE) 32 gauge x 5/32" Ndle To use 4 times per day with insulin injections. (Patient taking differently: 1 pen by Misc.(Non-Drug; Combo Route) route 4 (four) times daily. To use 4 times per day with insulin injections.) 450 each 2    [DISCONTINUED] blood-glucose meter (ACCU-CHEK KAYLIE PLUS METER) Misc To use to check blood sugars 4 times a day 1 each 0    [DISCONTINUED] clorazepate (TRANXENE) 3.75 MG Tab Take 7.5 mg by mouth nightly as needed.       [DISCONTINUED] dextrose (GLUCOSE GEL) 40 % gel Take 37.5 mLs " (15,000 mg total) by mouth once as needed (hypoglycemia). 37.5 g 4    [DISCONTINUED] ezetimibe (ZETIA) 10 mg tablet Take 1 tablet (10 mg total) by mouth once daily.      [DISCONTINUED] fenofibrate 160 MG Tab Take 1 tablet (160 mg total) by mouth once daily. 90 tablet 3    [DISCONTINUED] lancing device with lancets (ACCU-CHEK SOFT DEV LANCETS) Kit To check blood sugars 4 times per day 400 each 3    [DISCONTINUED] pantoprazole (PROTONIX) 40 MG tablet Take 1 tablet (40 mg total) by mouth once daily.         Review of Systems:  Neg    Physical Exam:  Constitutional: nad, aao x 3  Heart: rrr, no m/r/g, no edema  Lungs: ctab, no w/r/r/c, no lb  Abdomen: s/nt/nd, +BS    Results:  Recent Labs   Lab 08/28/22  0659 08/29/22  0520 08/30/22  0514    132* 132*   K 4.4 4.4 4.2    97 97   CO2 22* 21* 25   BUN 47* 60* 32*   CREATININE 6.9* 7.6* 4.9*   GLU 94 158* 154*         Recent Labs   Lab 08/28/22  0659 08/29/22  0520 08/30/22  0514   CALCIUM 9.5 9.2 8.6*   ALBUMIN 3.5 3.4* 3.1*   PHOS 4.4 4.8* 4.3   MG 2.2 2.2 2.2         Recent Labs   Lab 01/30/20  0705 03/10/22  0650   PTH, Intact 466.0 H 387.0 H         No results for input(s): POCTGLUCOSE in the last 168 hours.    Recent Labs   Lab 05/05/22  0320 05/29/22  1324 06/22/22  0618   Hemoglobin A1C 10.6 H 8.3 H 7.5 H         Recent Labs   Lab 08/28/22  0659 08/29/22  0520 08/30/22  0514   WBC 9.35 9.84 9.90   HGB 10.4* 9.9* 8.6*   HCT 33.8* 32.3* 27.3*    279 226   MCV 87 86 86   MCHC 30.8* 30.7* 31.5*   MONO 11.1  1.0 10.4  1.0 10.0  1.0         Recent Labs   Lab 08/28/22  0659 08/29/22  0520 08/30/22  0514   BILITOT 0.6 0.5 0.5   PROT 7.5 7.3 6.8   ALBUMIN 3.5 3.4* 3.1*   ALKPHOS 68 80 64   ALT 7* 10 9*   AST 16 19 14         Recent Labs   Lab 05/29/22  0900 06/21/22  1612   Color, UA Yellow Yellow   Appearance, UA Clear Clear   pH, UA 8.0 >8.0 A   Specific Rudd, UA 1.015 1.010   Protein, UA 2+ A 3+ A   Glucose, UA 2+ A 2+ A   Ketones, UA Negative  Negative   Urobilinogen, UA Negative Negative   Bilirubin (UA) Negative Negative   Occult Blood UA Negative Negative   Nitrite, UA Negative Negative   RBC, UA 1 1   WBC, UA 5 2   Bacteria Rare Negative   Hyaline Casts, UA 0 12 A         Recent Labs   Lab 05/19/21  0300   POC PH 7.273 L   POC PCO2 47.8 H   POC HCO3 22.1 L   POC PO2 22 LL   POC SATURATED O2 30 L   POC BE -5   Sample VENOUS       I have spent > minutes providing care for this patient for the above diagnoses. These services have included chart/data/imaging review, evaluation, exam, formulation of plan, , note preparation, and discussions with staff involved in this patient's care.    Emily Overton NP    [facility]  [address] [place] [id number]  [phone] (p)  [phone] (f)

## 2022-08-30 NOTE — DISCHARGE SUMMARY
[facility] Medicine  Discharge Summary      Patient Name: Leanna García  MRN: [id number]  Patient Class: IP- Inpatient  Admission Date: 8/18/2022  Hospital Length of Stay: 10 days  Discharge Date and Time: 8/30/2022  3:20 PM  Attending Physician: Precious att. providers found   Discharging Provider: Andrea Montoya MD  Primary Care Provider: Stefano Lopez MD      Procedure(s) (LRB):  CREATION, AV FISTULA (Left)      Hospital Course:   57-year-old  female with multiple medical comorbidities including but not limited to type 2 diabetes mellitus complicated by diabetic foot ulcer, ESRD on hemodialysis, hyperlipidemia presented to the ED with nausea and vomiting as well as redness involving the right foot wound.    Diagnosed with diabetic gastroparesis and symptoms resolved with metoclopramide.  She was recently discharged after prolonged LTAC stay for IV antibiotics of the left diabetic foot ulcer.  She was evaluated by Podiatry.  She underwent bedside excision debridement of the left midfoot on 08/19.  MRI revealed acute osteomyelitis of the left great toe involving proximal and distal phalanges.  This was followed by bone biopsy of the left hallux and excisional debridement with cultures growing Proteus mirabilis.  Eventually patient underwent amputation of the 1st toe and partial 1st metatarsal head amputation 8/26.  Seen by Infectious Disease.  Initially on broad-spectrum antibiotics and eventually de-escalated to intravenous cefepime which she will continue for 2 more weeks.    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  Fistulogram revealed large thrombus with aneurysms.  Underwent fistula repair in the OR.    Stable for discharge to home with resumption of home health.  Wound care orders sent.  Patient aware to follow-up with PCP, Podiatry and ID.  Discharge instructions and return precautions reviewed.    I have seen the  patient on the day of discharge and reviewed the discharge instructions as outlined below. Patient verbalized understanding and is aware to contact primary care physician or return to ED if new or worsening symptoms.         Goals of Care Treatment Preferences:  Code Status: Full Code      Consults:   Consults (From admission, onward)        Status Ordering Provider     Inpatient consult to Social Work/Case Management  Once        Provider:  (Not yet assigned)    Completed CLAU LITTLE     Inpatient consult to   Once        Provider:  (Not yet assigned)    Completed CLAU LITTLE     Inpatient consult to Vascular Surgery  Once        Provider:  Jose Haas MD    Completed DESI EATON     Inpatient consult to Infectious Diseases  Once        Provider:  Janki Bailon MD    Completed CLAU LITTLE     Inpatient consult to Nephrology  Once        Provider:  Donnell George MD    Completed MIKE RODRIGUEZ     Inpatient consult to Podiatry  Once        Provider:  Alivia Bruno DPM    Acknowledged MIKE RODRIGUEZ     Inpatient consult to Hospitalist  Once        Provider:  (Not yet assigned)    Acknowledged MIKE RODRIGUEZ     Inpatient consult to Hospitalist  Once        Provider:  (Not yet assigned)    Acknowledged MIKE RODRIGUEZ          No new Assessment & Plan notes have been filed under this hospital service since the last note was generated.  Service: Hospital Medicine    Final Active Diagnoses:    Diagnosis Date Noted POA    PRINCIPAL PROBLEM:  Diabetic ulcer of left midfoot associated with type 2 diabetes mellitus [E11.621, L97.429]  Yes    Diabetic ulcer of right midfoot associated with type 2 diabetes mellitus, limited to breakdown of skin [E11.621, L97.411] 08/30/2022 Unknown    Precordial pain [R07.2] 08/30/2022 Unknown    Acute hematogenous osteomyelitis of left foot [M86.072] 08/23/2022 Yes    Gastroparesis [K31.84] 08/18/2022 Yes    Slow  transit constipation [K59.01] 07/07/2022 Yes    Ulcer of left foot with necrosis of muscle [L97.523] 07/04/2022 Yes    PAD (peripheral artery disease) [I73.9] 06/27/2022 Yes    Type 2 diabetes mellitus with kidney complication, with long-term current use of insulin [E11.29, Z79.4] 06/25/2020 Not Applicable    ESRD (end stage renal disease) [N18.6] 05/18/2018 Yes    Hyperlipemia [E78.5] 04/24/2018 Yes    Hypertension associated with diabetes [E11.59, I15.2] 06/13/2013 Yes     Chronic      Problems Resolved During this Admission:    Diagnosis Date Noted Date Resolved POA    Chest pain [R07.9] 10/14/2016 08/19/2022 Yes       Discharged Condition: stable    Disposition: Home-Health Care [facility]    Follow Up:   Follow-up Information     Alivia Bruno DPM Follow up in 2 week(s).    Specialties: Podiatry, Surgery, Wound Care  Why: For wound re-check    CALL AND MAKE APPOINTMENT PLEASE. (office closed when  attempted to schedule)  Contact information:  [address] [facility]  [address]  [facility]  [facility] [address]  [phone]             Janki Bailon MD. Go on 9/28/2022.    Specialty: Infectious Diseases  Why: @ 11 am  Contact information:  [address] [facility]  [address]  [facility] [facility] [address]  [phone]             Ali Khoobehi, MD. Go on 9/13/2022.    Specialty: Vascular Surgery  Why: @ 2:45 pm   AV fistula repair follow-up  Contact information:  [address] [facility]  [address]  [facility] [address]  [phone]             Stefano Lopez MD. Go on 8/30/2022.    Specialty: Family Medicine  Why: Office will call with appointment  Contact information:  [address] [facility] [address]  [phone]                       Patient Instructions:      Ambulatory referral/consult to Home Health   Standing Status: Future   Referral Priority: Routine Referral Type: Home Health   Referral Reason: Specialty Services Required   Requested Specialty: Home Health Services   Number of Visits Requested: 1     Diet  renal     Diet diabetic     Notify your health care provider if you experience any of the following:  temperature >100.4     Notify your health care provider if you experience any of the following:  persistent nausea and vomiting or diarrhea     Notify your health care provider if you experience any of the following:  severe uncontrolled pain     Notify your health care provider if you experience any of the following:   Order Comments: Worsening or recurrent symptoms     Change dressing (specify)   Order Comments: Dressing change: Wound vac to left plantar foot wound, dressing change 2 times a week, setting at -125 megahertz. Paint left heel with betadine allow time to dry, cover with abd pad or foam, pad well. Cover incision amputation site with foam dressing. wrap lightly with kerlix     Activity as tolerated       Significant Diagnostic Studies: Labs:   CMP   Recent Labs   Lab 08/29/22  0520 08/30/22 0514   * 132*   K 4.4 4.2   CL 97 97   CO2 21* 25   * 154*   BUN 60* 32*   CREATININE 7.6* 4.9*   CALCIUM 9.2 8.6*   PROT 7.3 6.8   ALBUMIN 3.4* 3.1*   BILITOT 0.5 0.5   ALKPHOS 80 64   AST 19 14   ALT 10 9*   ANIONGAP 14 10    and CBC   Recent Labs   Lab 08/29/22  0520 08/30/22  0514   WBC 9.84 9.90   HGB 9.9* 8.6*   HCT 32.3* 27.3*    226       Pending Diagnostic Studies:     Procedure Component Value Units Date/Time    Specimen to Pathology - Surgery [[id number]] Collected: 08/29/22 1233    Order Status: Sent Lab Status: No result     Specimen: Tissue          Medications:  Reconciled Home Medications:      Medication List      START taking these medications    cefepime in dextrose 5 % 1 gram/50 mL Pgbk  Commonly known as: MAXIPIME  Inject 100 mLs (2 g total) into the vein every Mon, Wed, Fri. 2 g every Monday Wednesday Friday post dialysis for 10 days  Start taking on: August 31, 2022        CHANGE how you take these medications    blood sugar diagnostic Strp  To check BG 4 times daily, to use  "with insurance preferred meter  What changed:   · how much to take  · how to take this  · when to take this     insulin aspart U-100 100 unit/mL (3 mL) Inpn pen  Commonly known as: NovoLOG Flexpen U-100 Insulin  Inject 5-8 units 3 times per day with food.  What changed:   · how much to take  · how to take this  · when to take this     LEVEMIR FLEXTOUCH U-100 INSULN 100 unit/mL (3 mL) Inpn pen  Generic drug: insulin detemir U-100  Inject 15 Units into the skin 2 (two) times daily.  What changed: when to take this     pen needle, diabetic 32 gauge x 5/32" Ndle  Commonly known as: BD ULTRA-FINE ROBERT PEN NEEDLE  To use 4 times per day with insulin injections.  What changed:   · how much to take  · how to take this  · when to take this        CONTINUE taking these medications    amLODIPine 10 MG tablet  Commonly known as: NORVASC  Take 1 tablet (10 mg total) by mouth once daily.     aspirin 81 MG EC tablet  Commonly known as: ECOTRIN  Take 81 mg by mouth once daily.     atorvastatin 80 MG tablet  Commonly known as: LIPITOR  Take 1 tablet (80 mg total) by mouth once daily.     azelastine 137 mcg (0.1 %) nasal spray  Commonly known as: ASTELIN  1 spray (137 mcg total) by Nasal route 2 (two) times a day.     calcium acetate(phosphat bind) 667 mg capsule  Commonly known as: PHOSLO  Take 1 capsule (667 mg total) by mouth 3 (three) times daily with meals.     cetirizine 10 MG tablet  Commonly known as: ZYRTEC  Take 1 tablet (10 mg total) by mouth every other day.     clopidogreL 75 mg tablet  Commonly known as: PLAVIX  Take 1 tablet (75 mg total) by mouth once daily.     epoetin chris-epbx 4,000 unit/mL injection  Commonly known as: RETACRIT  Inject 1.11 mLs (4,440 Units total) into the skin every Mon, Wed, Fri.     fluticasone propionate 50 mcg/actuation nasal spray  Commonly known as: FLONASE  2 sprays (100 mcg total) by Each Nostril route once daily.     gabapentin 100 MG capsule  Commonly known as: NEURONTIN  Take 1 capsule " (100 mg total) by mouth 2 (two) times daily.     hydrALAZINE 50 MG tablet  Commonly known as: APRESOLINE  Take 1 tablet (50 mg total) by mouth every 8 (eight) hours.     lactulose 10 gram/15 mL solution  Commonly known as: CHRONULAC  Take 20 g by mouth 2 (two) times a day.     losartan 100 MG tablet  Commonly known as: COZAAR  Take 1 tablet (100 mg total) by mouth once daily.     ondansetron 8 MG Tbdl  Commonly known as: ZOFRAN-ODT  Take 1 tablet (8 mg total) by mouth every 8 (eight) hours as needed (nausea).     oxyCODONE 5 MG immediate release tablet  Commonly known as: ROXICODONE  Take 1 tablet (5 mg total) by mouth every 6 (six) hours as needed for Pain.     polyethylene glycol 17 gram Pwpk  Commonly known as: GLYCOLAX  Take 17 g by mouth 2 (two) times daily as needed.     senna-docusate 8.6-50 mg 8.6-50 mg per tablet  Commonly known as: PERICOLACE  Take 1 tablet by mouth 2 (two) times daily.        STOP taking these medications    blood-glucose meter Misc  Commonly known as: ACCU-CHEK KAYLIE PLUS METER     clorazepate 3.75 MG Tab  Commonly known as: TRANXENE     dextrose 40 % gel  Commonly known as: Glucose GeL     ezetimibe 10 mg tablet  Commonly known as: ZETIA     fenofibrate 160 MG Tab     lancing device with lancets Kit  Commonly known as: ACCU-CHEK SOFT DEV LANCETS     oxybutynin 5 MG Tab  Commonly known as: DITROPAN     pantoprazole 40 MG tablet  Commonly known as: PROTONIX            Indwelling Lines/Drains at time of discharge:   Lines/Drains/Airways     Drain  Duration                Hemodialysis AV Fistula Left upper arm -- days                Time spent on the discharge of patient: 45 minutes         Andrea Montoya MD  Department of Hospital Medicine  [facility]

## 2022-08-30 NOTE — PROGRESS NOTES
[facility]  Adult Nutrition   Progress Note (Follow-Up)    SUMMARY      Recommendations:   1. Continue Renal diet as tolerated  2. Continue Nepro TID and Wilman BID as tolerated.  3.  to obtain meal choices daily.     Goals:   Goals:   1. Intake to be >/= 75% estimated needs for kcal and protein.   2. Lab values trend to target range.    Dietitian Rounds Brief  Patient reports good intake of meals. Patient reports drinking Nepro and Wilman. Last GM per pt was 8/28/22. Encouraged reporting BMs > 3 days apart. Patient s/p AV fistula repair 8/29/22. Labs progressing.    Diet order: Renal    Oral Supplement: Nepro with meals ; Wilman BID for healing.    % Intake of Estimated Energy Needs: 50 - 75 %  % Meal Intake: 50 - 75 %    Estimated/Assessed Needs  Weight Used For Calorie Calculations: 85.7 kg (188 lb 15 oz)  Energy Calorie Requirements (kcal): 4689-5870 kcal/day (20-25 kcal/kg)     Protein Requirements: 103-129 gm/day (1.2-1.5 gm/kg)  Weight Used For Protein Calculations: 85.7 kg (188 lb 15 oz)     Estimated Fluid Requirement Method: RDA Method  RDA Method (mL): 1714     Weight History:  Wt Readings from Last 5 Encounters:   08/21/22 85.7 kg (188 lb 15 oz)   08/07/22 87.3 kg (192 lb 7.4 oz)   07/24/22 78.9 kg (173 lb 14.4 oz)   06/21/22 89.1 kg (196 lb 6.9 oz)   06/13/22 89.9 kg (198 lb 3.1 oz)      Reason for Assessment  Reason For Assessment: length of stay  Diagnosis: infection/sepsis, other (see comments) (diabetic ulcer)  Relevant Medical History: ESRD, DM2, gastroparesis    Medications:Pertinent Medications Reviewed  Scheduled Meds:   amLODIPine  10 mg Oral Daily    aspirin  81 mg Oral Daily    atorvastatin  80 mg Oral Daily    calcium acetate(phosphat bind)  667 mg Oral TID WM    ceFEPime (MAXIPIME) IVPB  1 g Intravenous Q24H    clopidogreL  75 mg Oral Daily    epoetin chris-epbx  4,400 Units Subcutaneous Every Mon, Wed, Fri    heparin (porcine)  5,000 Units Subcutaneous Q8H    hydrALAZINE   50 mg Oral Q8H    insulin detemir U-100  15 Units Subcutaneous BID    losartan  100 mg Oral Daily    metoclopramide HCl  5 mg Oral QID (AC & HS)    sodium zirconium cyclosilicate  10 g Oral Daily     Continuous Infusions:   alteplase       PRN Meds:.acetaminophen, alteplase, heparin (porcine), hydrALAZINE, insulin aspart U-100, melatonin, naloxone, ondansetron, oxyCODONE, senna-docusate 8.6-50 mg, sodium chloride 0.9%, sodium chloride 0.9%, sodium chloride 0.9%, sodium chloride 0.9%    Labs: Pertinent Labs Reviewed  Clinical Chemistry:  Recent Labs   Lab 08/28/22  0659 08/29/22  0520 08/30/22  0514    132* 132*   K 4.4 4.4 4.2    97 97   CO2 22* 21* 25   GLU 94 158* 154*   BUN 47* 60* 32*   CREATININE 6.9* 7.6* 4.9*   CALCIUM 9.5 9.2 8.6*   PROT 7.5 7.3 6.8   ALBUMIN 3.5 3.4* 3.1*   BILITOT 0.6 0.5 0.5   ALKPHOS 68 80 64   AST 16 19 14   ALT 7* 10 9*   ANIONGAP 14 14 10   MG 2.2 2.2 2.2   PHOS 4.4 4.8* 4.3     CBC:   Recent Labs   Lab 08/30/22  0514   WBC 9.90   RBC 3.16*   HGB 8.6*   HCT 27.3*      MCV 86   MCH 27.2   MCHC 31.5*     Monitor and Evaluation  Food and Nutrient Intake: energy intake, food and beverage intake  Food and Nutrient Adminstration: diet order  Knowledge/Beliefs/Attitudes: food and nutrition knowledge/skill  Physical Activity and Function: nutrition-related ADLs and IADLs  Anthropometric Measurements: weight, weight change, body mass index  Biochemical Data, Medical Tests and Procedures: electrolyte and renal panel, inflammatory profile, gastrointestinal profile, lipid profile, glucose/endocrine profile  Nutrition-Focused Physical Findings: overall appearance     Nutrition Risk  Level of Risk/Frequency of Follow-up: moderate  Nutrition Follow-Up  RD Follow-up?: Yes    Sonia Christiansen RD 08/30/2022 2:17 PM

## 2022-08-30 NOTE — PLAN OF CARE
Patient cleared for discharge from case management standpoint.    Follow up appointments scheduled and added to AVS.    CM sent discharge orders to [facility], [facility] planned for 8/31 per Deandra.    CM contacted [facility], spoke with Keri. Patient will resume dialysis at [facility] tomorrow. [facility] Keri confirmed receipt of orders. [facility] pharmacy will supply IV medication and outside infusion agency is not needed per Keri. CM informed patient to report to [facility] tomorrow. Patiebt verbalized understanding.    CM noted equipment orders. Patient reports having a functioning walker at home.Patient is not eligible for insurance replacement of walker at this time.    Chart and discharge orders reviewed.  Patient discharged home with no further case management needs.       08/30/22 1319   Final Note   Assessment Type Final Discharge Note   Anticipated Discharge Disposition Home-Health   What phone number can be called within the next 1-3 days to see how you are doing after discharge? [phone]   Hospital Resources/Appts/Education Provided Provided patient/caregiver with written discharge plan information;Appointments scheduled and added to AVS   Post-Acute Status   Post-Acute Authorization Home Health   Home Health Status Set-up Complete/Auth obtained   Discharge Delays None known at this time

## 2022-08-30 NOTE — SUBJECTIVE & OBJECTIVE
Subjective:     Interval History:  Resting in bed.  Wound VAC intact to medial foot wound.  Removed dressing for incision to examine wound.  Examined heel wound as well.  Eschar remains stable at this time no fluctuance.    Follow-up For: Procedure(s) (LRB):  CREATION, AV FISTULA (Left)    Post-Operative Day: 1 Day Post-Op    Scheduled Meds:   amLODIPine  10 mg Oral Daily    aspirin  81 mg Oral Daily    atorvastatin  80 mg Oral Daily    calcium acetate(phosphat bind)  667 mg Oral TID WM    ceFEPime (MAXIPIME) IVPB  1 g Intravenous Q24H    clopidogreL  75 mg Oral Daily    epoetin chris-epbx  4,400 Units Subcutaneous Every Mon, Wed, Fri    heparin (porcine)  5,000 Units Subcutaneous Q8H    hydrALAZINE  50 mg Oral Q8H    insulin detemir U-100  15 Units Subcutaneous BID    losartan  100 mg Oral Daily    metoclopramide HCl  5 mg Oral QID (AC & HS)    sodium zirconium cyclosilicate  10 g Oral Daily     Continuous Infusions:   alteplase       PRN Meds:acetaminophen, alteplase, heparin (porcine), hydrALAZINE, insulin aspart U-100, melatonin, naloxone, ondansetron, oxyCODONE, senna-docusate 8.6-50 mg, sodium chloride 0.9%, sodium chloride 0.9%, sodium chloride 0.9%, sodium chloride 0.9%    Review of Systems  Objective:     Vital Signs (Most Recent):  Temp: 97.6 °F (36.4 °C) (08/30/22 1152)  Pulse: 83 (08/30/22 1152)  Resp: 18 (08/30/22 1152)  BP: (!) 152/88 (08/30/22 1152)  SpO2: 99 % (08/30/22 1152)   Vital Signs (24h Range):  Temp:  [97.5 °F (36.4 °C)-98.7 °F (37.1 °C)] 97.6 °F (36.4 °C)  Pulse:  [73-87] 83  Resp:  [12-19] 18  SpO2:  [93 %-99 %] 99 %  BP: (100-188)/(47-88) 152/88     Weight: 85.7 kg (188 lb 15 oz) (wound vac on bed when weighing)  Body mass index is 27.9 kg/m².    Foot Exam                No fluctuance to heel eschar  Laboratory:  All pertinent labs reviewed within the last 24 hours.    Diagnostic Results:  I have reviewed all pertinent imaging results/findings within the past 24 hours.

## 2022-08-30 NOTE — PROGRESS NOTES
[facility]  Podiatry  Progress Note    Patient Name: Leanna García  MRN: [id number]  Admission Date: 8/18/2022  Hospital Length of Stay: 10 days  Attending Physician: Andrea Montoya MD  Primary Care Provider: Stefano Lopez MD     Subjective:     Interval History:  Resting in bed.  Wound VAC intact to medial foot wound.  Removed dressing for incision to examine wound.  Examined heel wound as well.  Eschar remains stable at this time no fluctuance.    Follow-up For: Procedure(s) (LRB):  CREATION, AV FISTULA (Left)    Post-Operative Day: 1 Day Post-Op    Scheduled Meds:   amLODIPine  10 mg Oral Daily    aspirin  81 mg Oral Daily    atorvastatin  80 mg Oral Daily    calcium acetate(phosphat bind)  667 mg Oral TID WM    ceFEPime (MAXIPIME) IVPB  1 g Intravenous Q24H    clopidogreL  75 mg Oral Daily    epoetin chris-epbx  4,400 Units Subcutaneous Every Mon, Wed, Fri    heparin (porcine)  5,000 Units Subcutaneous Q8H    hydrALAZINE  50 mg Oral Q8H    insulin detemir U-100  15 Units Subcutaneous BID    losartan  100 mg Oral Daily    metoclopramide HCl  5 mg Oral QID (AC & HS)    sodium zirconium cyclosilicate  10 g Oral Daily     Continuous Infusions:   alteplase       PRN Meds:acetaminophen, alteplase, heparin (porcine), hydrALAZINE, insulin aspart U-100, melatonin, naloxone, ondansetron, oxyCODONE, senna-docusate 8.6-50 mg, sodium chloride 0.9%, sodium chloride 0.9%, sodium chloride 0.9%, sodium chloride 0.9%    Review of Systems  Objective:     Vital Signs (Most Recent):  Temp: 97.6 °F (36.4 °C) (08/30/22 1152)  Pulse: 83 (08/30/22 1152)  Resp: 18 (08/30/22 1152)  BP: (!) 152/88 (08/30/22 1152)  SpO2: 99 % (08/30/22 1152)   Vital Signs (24h Range):  Temp:  [97.5 °F (36.4 °C)-98.7 °F (37.1 °C)] 97.6 °F (36.4 °C)  Pulse:  [73-87] 83  Resp:  [12-19] 18  SpO2:  [93 %-99 %] 99 %  BP: (100-188)/(47-88) 152/88     Weight: 85.7 kg (188 lb 15 oz) (wound vac on bed when weighing)  Body mass index  is 27.9 kg/m².    Foot Exam                No fluctuance to heel eschar  Laboratory:  All pertinent labs reviewed within the last 24 hours.    Diagnostic Results:  I have reviewed all pertinent imaging results/findings within the past 24 hours.    Assessment/Plan:     * Diabetic ulcer of left midfoot associated with type 2 diabetes mellitus  Continue wound VAC changes twice weekly.    Continue Betadine in additional padding to heel wound.      Emmie Bruno DPM  Podiatry  [facility]

## 2022-08-31 ENCOUNTER — TELEPHONE (OUTPATIENT)
Dept: FAMILY MEDICINE | Facility: CLINIC | Age: 57
End: 2022-08-31
Payer: MEDICAID

## 2022-09-06 ENCOUNTER — OFFICE VISIT (OUTPATIENT)
Dept: WOUND CARE | Facility: HOSPITAL | Age: 57
End: 2022-09-06
Attending: PODIATRIST
Payer: MEDICARE

## 2022-09-06 VITALS
BODY MASS INDEX: 25.92 KG/M2 | RESPIRATION RATE: 18 BRPM | DIASTOLIC BLOOD PRESSURE: 69 MMHG | HEART RATE: 79 BPM | HEIGHT: 69 IN | TEMPERATURE: 98 F | SYSTOLIC BLOOD PRESSURE: 163 MMHG | WEIGHT: 175 LBS

## 2022-09-06 DIAGNOSIS — Z86.73 HISTORY OF TIA (TRANSIENT ISCHEMIC ATTACK): ICD-10-CM

## 2022-09-06 DIAGNOSIS — E11.22 TYPE 2 DIABETES MELLITUS WITH CHRONIC KIDNEY DISEASE ON CHRONIC DIALYSIS, WITH LONG-TERM CURRENT USE OF INSULIN: ICD-10-CM

## 2022-09-06 DIAGNOSIS — Z79.4 TYPE 2 DIABETES MELLITUS WITH HYPOGLYCEMIA AND COMA, WITH LONG-TERM CURRENT USE OF INSULIN: ICD-10-CM

## 2022-09-06 DIAGNOSIS — L97.523 ULCER OF LEFT FOOT WITH NECROSIS OF MUSCLE: ICD-10-CM

## 2022-09-06 DIAGNOSIS — L97.423 DIABETIC ULCER OF LEFT MIDFOOT ASSOCIATED WITH TYPE 2 DIABETES MELLITUS, WITH NECROSIS OF MUSCLE: Primary | ICD-10-CM

## 2022-09-06 DIAGNOSIS — L97.519 DIABETIC ULCER OF OTHER PART OF RIGHT FOOT ASSOCIATED WITH DIABETES MELLITUS DUE TO UNDERLYING CONDITION, UNSPECIFIED ULCER STAGE: ICD-10-CM

## 2022-09-06 DIAGNOSIS — Z89.432 HISTORY OF AMPUTATION OF LEFT FOOT: ICD-10-CM

## 2022-09-06 DIAGNOSIS — L08.9 DIABETIC FOOT INFECTION: ICD-10-CM

## 2022-09-06 DIAGNOSIS — Z99.2 TYPE 2 DIABETES MELLITUS WITH CHRONIC KIDNEY DISEASE ON CHRONIC DIALYSIS, WITH LONG-TERM CURRENT USE OF INSULIN: ICD-10-CM

## 2022-09-06 DIAGNOSIS — R09.89 DECREASED PEDAL PULSES: ICD-10-CM

## 2022-09-06 DIAGNOSIS — E11.641 TYPE 2 DIABETES MELLITUS WITH HYPOGLYCEMIA AND COMA, WITH LONG-TERM CURRENT USE OF INSULIN: ICD-10-CM

## 2022-09-06 DIAGNOSIS — Z91.89 AT RISK FOR READMISSION TO HOSPITAL: ICD-10-CM

## 2022-09-06 DIAGNOSIS — R23.4 ESCHAR OF HEEL: ICD-10-CM

## 2022-09-06 DIAGNOSIS — E11.649 TYPE 2 DIABETES MELLITUS WITH HYPOGLYCEMIA UNAWARENESS: ICD-10-CM

## 2022-09-06 DIAGNOSIS — Z79.4 TYPE 2 DIABETES MELLITUS WITH CHRONIC KIDNEY DISEASE ON CHRONIC DIALYSIS, WITH LONG-TERM CURRENT USE OF INSULIN: ICD-10-CM

## 2022-09-06 DIAGNOSIS — E11.628 DIABETIC FOOT INFECTION: ICD-10-CM

## 2022-09-06 DIAGNOSIS — Z91.89 AT HIGH RISK FOR INADEQUATE NUTRITIONAL INTAKE: ICD-10-CM

## 2022-09-06 DIAGNOSIS — N18.6 ESRD (END STAGE RENAL DISEASE): ICD-10-CM

## 2022-09-06 DIAGNOSIS — N18.6 TYPE 2 DIABETES MELLITUS WITH CHRONIC KIDNEY DISEASE ON CHRONIC DIALYSIS, WITH LONG-TERM CURRENT USE OF INSULIN: ICD-10-CM

## 2022-09-06 DIAGNOSIS — E08.621 DIABETIC ULCER OF OTHER PART OF RIGHT FOOT ASSOCIATED WITH DIABETES MELLITUS DUE TO UNDERLYING CONDITION, UNSPECIFIED ULCER STAGE: ICD-10-CM

## 2022-09-06 DIAGNOSIS — R26.2 DIFFICULTY IN WALKING, NOT ELSEWHERE CLASSIFIED: ICD-10-CM

## 2022-09-06 DIAGNOSIS — I73.9 PERIPHERAL VASCULAR DISEASE: ICD-10-CM

## 2022-09-06 DIAGNOSIS — E11.621 DIABETIC ULCER OF LEFT MIDFOOT ASSOCIATED WITH TYPE 2 DIABETES MELLITUS, WITH NECROSIS OF MUSCLE: Primary | ICD-10-CM

## 2022-09-06 PROCEDURE — 3078F PR MOST RECENT DIASTOLIC BLOOD PRESSURE < 80 MM HG: ICD-10-PCS | Mod: CPTII,,, | Performed by: PODIATRIST

## 2022-09-06 PROCEDURE — 1111F PR DISCHARGE MEDS RECONCILED W/ CURRENT OUTPATIENT MED LIST: ICD-10-PCS | Mod: CPTII,,, | Performed by: PODIATRIST

## 2022-09-06 PROCEDURE — 99214 PR OFFICE/OUTPT VISIT, EST, LEVL IV, 30-39 MIN: ICD-10-PCS | Mod: 25,24,, | Performed by: PODIATRIST

## 2022-09-06 PROCEDURE — 4010F ACE/ARB THERAPY RXD/TAKEN: CPT | Mod: CPTII,,, | Performed by: PODIATRIST

## 2022-09-06 PROCEDURE — 1160F RVW MEDS BY RX/DR IN RCRD: CPT | Mod: CPTII,,, | Performed by: PODIATRIST

## 2022-09-06 PROCEDURE — 1111F DSCHRG MED/CURRENT MED MERGE: CPT | Mod: CPTII,,, | Performed by: PODIATRIST

## 2022-09-06 PROCEDURE — 99214 OFFICE O/P EST MOD 30 MIN: CPT | Mod: 25,24,, | Performed by: PODIATRIST

## 2022-09-06 PROCEDURE — 3051F HG A1C>EQUAL 7.0%<8.0%: CPT | Mod: CPTII,,, | Performed by: PODIATRIST

## 2022-09-06 PROCEDURE — 1160F PR REVIEW ALL MEDS BY PRESCRIBER/CLIN PHARMACIST DOCUMENTED: ICD-10-PCS | Mod: CPTII,,, | Performed by: PODIATRIST

## 2022-09-06 PROCEDURE — 1159F PR MEDICATION LIST DOCUMENTED IN MEDICAL RECORD: ICD-10-PCS | Mod: CPTII,,, | Performed by: PODIATRIST

## 2022-09-06 PROCEDURE — 4010F PR ACE/ARB THEARPY RXD/TAKEN: ICD-10-PCS | Mod: CPTII,,, | Performed by: PODIATRIST

## 2022-09-06 PROCEDURE — 3078F DIAST BP <80 MM HG: CPT | Mod: CPTII,,, | Performed by: PODIATRIST

## 2022-09-06 PROCEDURE — 3051F PR MOST RECENT HEMOGLOBIN A1C LEVEL 7.0 - < 8.0%: ICD-10-PCS | Mod: CPTII,,, | Performed by: PODIATRIST

## 2022-09-06 PROCEDURE — 3008F PR BODY MASS INDEX (BMI) DOCUMENTED: ICD-10-PCS | Mod: CPTII,,, | Performed by: PODIATRIST

## 2022-09-06 PROCEDURE — 11043 DBRDMT MUSC&/FSCA 1ST 20/<: CPT | Performed by: PODIATRIST

## 2022-09-06 PROCEDURE — 3066F NEPHROPATHY DOC TX: CPT | Mod: CPTII,,, | Performed by: PODIATRIST

## 2022-09-06 PROCEDURE — 3077F PR MOST RECENT SYSTOLIC BLOOD PRESSURE >= 140 MM HG: ICD-10-PCS | Mod: CPTII,,, | Performed by: PODIATRIST

## 2022-09-06 PROCEDURE — 3008F BODY MASS INDEX DOCD: CPT | Mod: CPTII,,, | Performed by: PODIATRIST

## 2022-09-06 PROCEDURE — 1159F MED LIST DOCD IN RCRD: CPT | Mod: CPTII,,, | Performed by: PODIATRIST

## 2022-09-06 PROCEDURE — 3077F SYST BP >= 140 MM HG: CPT | Mod: CPTII,,, | Performed by: PODIATRIST

## 2022-09-06 PROCEDURE — 11043 PR DEBRIDEMENT, SKIN, SUB-Q TISSUE,MUSCLE,=<20 SQ CM: ICD-10-PCS | Mod: 79,,, | Performed by: PODIATRIST

## 2022-09-06 PROCEDURE — 99214 OFFICE O/P EST MOD 30 MIN: CPT | Mod: 25 | Performed by: PODIATRIST

## 2022-09-06 PROCEDURE — 3066F PR DOCUMENTATION OF TREATMENT FOR NEPHROPATHY: ICD-10-PCS | Mod: CPTII,,, | Performed by: PODIATRIST

## 2022-09-06 PROCEDURE — 11043 DBRDMT MUSC&/FSCA 1ST 20/<: CPT | Mod: 79,,, | Performed by: PODIATRIST

## 2022-09-06 NOTE — PROGRESS NOTES
[address] [facility] [address]  [place] [address]  Phone: [phone]   [phone]    Patient's PCP:Stefano Lopez MD  Referring Provider: Aaareferral Self    Subjective:      Chief Complaint:: Wound Care, Non-healing Wound, Pressure Ulcer, Diabetic Foot Ulcer, Wound Check, Hospital Follow Up, Diabetes, and Wound Infection    HPI  Leanna García is a 57 y.o. female who presents today with a complaint of right foot ulcer, left midfoot ulcer, left heel ulcer, and left post amputation site of great toe.         1. Debridement of left midfoot ulcer.  Evaluation and assessment only of left heel ulcer and right foot ulcer. See Wound Docs for assessment of wounds and procedure notes  2. Continue taking all medications as prescribed  3. Continue home health dressing changes, wound vac to left plantar foot wound.  Betadine followed by bulky wrap compression to heel wound.  Betadine with border foam to right foot wound.   4. RTC one week   5. Counseled patient on increasing protein intake, not getting wound wet, keeping dressing clean dry and intact, following a healthy diet, elevating legs when able, removing pressure from wound    Total time spent for E&M 35  Total time for debridement 35 minutes           Patient Name: Leanna García  MRN: [id number]  Patient Class: IP- Inpatient  Admission Date: 8/18/2022  Hospital Length of Stay: 10 days  Discharge Date and Time: 8/30/2022  3:20 PM  Attending Physician: Precious att. providers found   Discharging Provider: Andrea Montoya MD  Primary Care Provider: Stefano Lopez MD    57-year-old  female with multiple medical comorbidities including but not limited to type 2 diabetes mellitus complicated by diabetic foot ulcer, ESRD on hemodialysis, hyperlipidemia presented to the ED with nausea and vomiting as well as redness involving the right foot wound.     Diagnosed with diabetic gastroparesis and symptoms resolved with metoclopramide.  She was recently  discharged after prolonged LTAC stay for IV antibiotics of the left diabetic foot ulcer.  She was evaluated by Podiatry.  She underwent bedside excision debridement of the left midfoot on 08/19.  MRI revealed acute osteomyelitis of the left great toe involving proximal and distal phalanges.  This was followed by bone biopsy of the left hallux and excisional debridement with cultures growing Proteus mirabilis.  Eventually patient underwent amputation of the 1st toe and partial 1st metatarsal head amputation 8/26.  Seen by Infectious Disease.  Initially on broad-spectrum antibiotics and eventually de-escalated to intravenous cefepime which she will continue for 2 more weeks.     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  Fistulogram revealed large thrombus with aneurysms.  Underwent fistula repair in the OR.     Stable for discharge to home with resumption of home health.  Wound care orders sent.  Patient aware to follow-up with PCP, Podiatry and ID.  Discharge instructions and return precautions reviewed.     I have seen the patient on the day of discharge and reviewed the discharge instructions as outlined below. Patient verbalized understanding and is aware to contact primary care physician or return to ED if new or worsening symptoms.    I counseled the patient on their conditions, their implications and medical management.     >50% of this > 60 minute visit was spent face to face educating/counseling the patient    I spent a total of 60 minutes on the day of the visit.This includes face to face time and non-face to face time preparing to see the patient (eg, review of tests), obtaining and/or reviewing separately obtained history, documenting clinical information in the electronic or other health record, independently interpreting results and communicating results to the patient/family/caregiver, or care coordinator.       Counseling/Education:  I provided  patient education verbally regarding:   The aspects of diabetes and how it pertains to the feet. I explained the importance of proper diabetic foot care and how it is essential for the health of their feet.    I discussed the importance of knowing their Hemoglobin A1c and that the level needs to be as close to 6 as possible. I discussed the increase complications of high blood sugar including stroke, blindness, heart attack, kidney failure and loss of limb secondary to neuropathy and PVD.     With neuropathy, beware of any breaks in the skin or redness. These areas are not recognized early due to the numbness.    I discussed Diabetes, lower back issues, metabolic disorders, systemic causes, chemotherapy, vitamin deficiency, heavy metal exposure, as some of the causes. I also explained that as much as 40% of the time we can not find a cause. I discussed different treatments available to control the symptoms but which may not cure the problem.       Counseled patient on the aspects of diabetes and how it pertains to the feet.  I explained the importance of proper diabetic foot care and how it is essential for the health of their feet.      Shoe inspection. Patient instructed on proper foot hygeine. We discussed wearing proper shoe gear, daily foot inspections, never walking without protective shoe gear, never putting sharp instruments to feet, routine podiatric nail visits every 2-3 months.          Patient should call the office immediately if any signs of infection, such as fever, chills, sweats, increased redness or pain.    Patient was instructed to call the clinic or go to the emergency department if their symptoms do not improve, worsens, or if new symptoms develop.  Patient was advised that if any increased swelling, pain, or numbness arise to go immediately to the ED. Patient knows to call any time if an emergency arises. Shared decision making occurred and patient verbalized understanding in agreement with  "this plan.      Much of the documentation for this visit was completed in the Wound Docs system.  Please see the attached documentation for further details about the patient's care. Scanned under the Media tab.        Alivia Bruno DPM       Vitals:    09/06/22 0833   BP: (!) 163/69   Pulse: 79   Resp: 18   Temp: 98.4 °F (36.9 °C)   Weight: 79.4 kg (175 lb)   Height: 5' 9" (1.753 m)   PainSc: 0-No pain      Shoe Size:     Past Surgical History:   Procedure Laterality Date    AV FISTULA PLACEMENT Left 8/29/2022    Procedure: CREATION, AV FISTULA;  Surgeon: Ali Khoobehi, MD;  Location: [facility] OR;  Service: Vascular;  Laterality: Left;    BONE BIOPSY Left 8/24/2022    Procedure: Biopsy-Bone;  Surgeon: Porfirio Ponce DPM;  Location: [facility] OR;  Service: Podiatry;  Laterality: Left;    COLONOSCOPY N/A 10/5/2016    Procedure: COLONOSCOPY;  Surgeon: Pillo Chanel MD;  Location: [facility] ENDO;  Service: Endoscopy;  Laterality: N/A;    COLONOSCOPY N/A 4/26/2022    Procedure: COLONOSCOPY;  Surgeon: Saravanan Rachel MD;  Location: [facility] ENDO;  Service: Endoscopy;  Laterality: N/A;    FISTULOGRAM Left 8/24/2022    Procedure: Fistulogram;  Surgeon: Ali Khoobehi, MD;  Location: [facility] CATH/EP LAB;  Service: Vascular;  Laterality: Left;    HERNIA REPAIR Bilateral 11/22/2016    inguinal    HYSTERECTOMY      INCISION AND DRAINAGE FOOT Left 5/13/2022    Procedure: INCISION AND DRAINAGE, FOOT;  Surgeon: Ricardo Bruno DPM;  Location: [facility] OR;  Service: Podiatry;  Laterality: Left;    OOPHORECTOMY      THROMBECTOMY, AV FISTULA, UPPER EXTREMITY, PERCUTANEOUS  8/24/2022    Procedure: THROMBECTOMY, AV FISTULA, UPPER EXTREMITY, PERCUTANEOUS;  Surgeon: Ali Khoobehi, MD;  Location: [facility] CATH/EP LAB;  Service: Vascular;;    TOE AMPUTATION Left 8/26/2022    Procedure: AMPUTATION, TOE;  Surgeon: Porfirio Ponce DPM;  Location: [facility] OR;  Service: Podiatry;  Laterality: Left;     Past Medical History:   Diagnosis Date    A-fib     resolved per " patient    Anemia due to end stage renal disease 6/30/2022    Arthritis     Bronchitis     Cardiovascular event risk, ASCVD 10-year risk 6.7% 10/15/2016    Diabetes mellitus type II     Diabetic foot infection     Diabetic ulcer of left midfoot 05/05/2022    Disorder of kidney and ureter     Encounter for blood transfusion     ESRD (end stage renal disease) 05/18/2018    MANJINDER (generalized anxiety disorder) 10/25/2016    History of colon polyps 11/02/2016    Hyperlipidemia     Hypertension     Stroke      Family History   Problem Relation Age of Onset    Hyperlipidemia Mother     Hypertension Mother     Hypertension Father     Diabetes Father     Diabetes Sister     Diabetes Sister     Diabetes Maternal Aunt     Diabetes Maternal Grandmother     Heart disease Maternal Grandmother     Cancer Paternal Grandmother     Breast cancer Neg Hx     Colon cancer Neg Hx     Ovarian cancer Neg Hx         Social History:   Marital Status:   Alcohol History:  reports no history of alcohol use.  Tobacco History:  reports that she has never smoked. She has never used smokeless tobacco.  Drug History:  reports no history of drug use.    Review of patient's allergies indicates:   Allergen Reactions    Dilaudid [hydromorphone] Nausea And Vomiting    Chlorhexidine Itching    Lortab [hydrocodone-acetaminophen] Itching       Current Outpatient Medications   Medication Sig Dispense Refill    amLODIPine (NORVASC) 10 MG tablet Take 1 tablet (10 mg total) by mouth once daily.      aspirin (ECOTRIN) 81 MG EC tablet Take 81 mg by mouth once daily.      atorvastatin (LIPITOR) 80 MG tablet Take 1 tablet (80 mg total) by mouth once daily. 90 tablet 3    azelastine (ASTELIN) 137 mcg (0.1 %) nasal spray 1 spray (137 mcg total) by Nasal route 2 (two) times a day. 30 mL 0    blood sugar diagnostic Strp To check BG 4 times daily, to use with insurance preferred meter (Patient taking differently: 1 strip by Misc.(Non-Drug; Combo Route) route 4  (four) times daily. To check BG 4 times daily, to use with insurance preferred meter) 450 strip 3    calcium acetate,phosphat bind, (PHOSLO) 667 mg capsule Take 1 capsule (667 mg total) by mouth 3 (three) times daily with meals.      cefepime in dextrose 5 % (MAXIPIME) 1 gram/50 mL PgBk Inject 100 mLs (2 g total) into the vein every Mon, Wed, Fri. 2 g every Monday Wednesday Friday post dialysis for 10 days      cetirizine (ZYRTEC) 10 MG tablet Take 1 tablet (10 mg total) by mouth every other day.      clopidogreL (PLAVIX) 75 mg tablet Take 1 tablet (75 mg total) by mouth once daily. 30 tablet 0    epoetin chris-epbx (RETACRIT) 4,000 unit/mL injection Inject 1.11 mLs (4,440 Units total) into the skin every Mon, Wed, Fri.      fluticasone propionate (FLONASE) 50 mcg/actuation nasal spray 2 sprays (100 mcg total) by Each Nostril route once daily.      gabapentin (NEURONTIN) 100 MG capsule Take 1 capsule (100 mg total) by mouth 2 (two) times daily.      hydrALAZINE (APRESOLINE) 50 MG tablet Take 1 tablet (50 mg total) by mouth every 8 (eight) hours. 90 tablet 0    insulin aspart U-100 (NOVOLOG FLEXPEN U-100 INSULIN) 100 unit/mL (3 mL) InPn pen Inject 5-8 units 3 times per day with food. (Patient taking differently: Inject 5-8 Units into the skin 3 (three) times daily with meals. Inject 5-8 units 3 times per day with food.) 1 Box 6    insulin detemir U-100 (LEVEMIR FLEXTOUCH U-100 INSULN) 100 unit/mL (3 mL) InPn pen Inject 15 Units into the skin 2 (two) times daily.  0    lactulose (CHRONULAC) 10 gram/15 mL solution Take 20 g by mouth 2 (two) times a day.      losartan (COZAAR) 100 MG tablet Take 1 tablet (100 mg total) by mouth once daily.      ondansetron (ZOFRAN-ODT) 8 MG TbDL Take 1 tablet (8 mg total) by mouth every 8 (eight) hours as needed (nausea).      oxyCODONE (ROXICODONE) 5 MG immediate release tablet Take 1 tablet (5 mg total) by mouth every 6 (six) hours as needed for Pain.      pen needle, diabetic (BD  "ULTRA-FINE ROBERT PEN NEEDLE) 32 gauge x 5/32" Ndle To use 4 times per day with insulin injections. (Patient taking differently: 1 pen by Misc.(Non-Drug; Combo Route) route 4 (four) times daily. To use 4 times per day with insulin injections.) 450 each 2    polyethylene glycol (GLYCOLAX) 17 gram PwPk Take 17 g by mouth 2 (two) times daily as needed.      senna-docusate 8.6-50 mg (PERICOLACE) 8.6-50 mg per tablet Take 1 tablet by mouth 2 (two) times daily.       No current facility-administered medications for this visit.       Review of Systems   Constitutional:  Negative for chills, fatigue, fever and unexpected weight change.   HENT:  Negative for hearing loss and trouble swallowing.    Eyes:  Negative for photophobia and visual disturbance.   Respiratory:  Negative for cough, shortness of breath and wheezing.    Cardiovascular:  Negative for chest pain, palpitations and leg swelling.   Gastrointestinal:  Negative for abdominal pain and nausea.   Genitourinary:  Negative for dysuria and frequency.   Musculoskeletal:  Negative for arthralgias, back pain, gait problem, joint swelling and myalgias.   Skin:  Positive for wound. Negative for rash.   Neurological:  Negative for tremors, seizures, weakness, numbness and headaches.   Hematological:  Does not bruise/bleed easily.         Objective:        Physical Exam:   Foot Exam  Physical Exam  Ortho/SPM Exam     Imaging:            Assessment:       1. Diabetic ulcer of left midfoot associated with type 2 diabetes mellitus, with necrosis of muscle    2. Type 2 diabetes mellitus with hypoglycemia and coma, with long-term current use of insulin    3. 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 History of TIA (transient ischemic attack)    5. Difficulty in walking, not elsewhere classified    6. ESRD (end stage renal disease)    7. Diabetic foot infection    8. Type 2 diabetes mellitus with chronic kidney disease on chronic dialysis, with long-term current " use of insulin    9. Ulcer of left foot with necrosis of muscle    10. At high risk for inadequate nutritional intake    11. History of amputation of left foot    12. Decreased pedal pulses    13. Peripheral vascular disease    14. At risk for readmission to hospital    15. Eschar of heel      Plan:   Diabetic ulcer of left midfoot associated with type 2 diabetes mellitus, with necrosis of muscle    Type 2 diabetes mellitus with hypoglycemia and coma, with long-term current use of insulin    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    History of TIA (transient ischemic attack)    Difficulty in walking, not elsewhere classified    ESRD (end stage renal disease)    Diabetic foot infection    Type 2 diabetes mellitus with chronic kidney disease on chronic dialysis, with long-term current use of insulin    Ulcer of left foot with necrosis of muscle    At high risk for inadequate nutritional intake    History of amputation of left foot    Decreased pedal pulses    Peripheral vascular disease    At risk for readmission to hospital    Eschar of heel    Follow up in about 1 week (around 9/13/2022).    Procedures          Counseling:     I provided patient education verbally regarding:   Patient diagnosis, treatment options, as well as alternatives, risks, and benefits.     This note was created using Dragon voice recognition software that occasionally misinterpreted phrases or words.

## 2022-09-13 ENCOUNTER — OFFICE VISIT (OUTPATIENT)
Dept: WOUND CARE | Facility: HOSPITAL | Age: 57
End: 2022-09-13
Attending: PODIATRIST
Payer: MEDICARE

## 2022-09-13 VITALS
RESPIRATION RATE: 18 BRPM | HEART RATE: 83 BPM | TEMPERATURE: 99 F | DIASTOLIC BLOOD PRESSURE: 88 MMHG | SYSTOLIC BLOOD PRESSURE: 191 MMHG

## 2022-09-13 DIAGNOSIS — Z79.4 TYPE 2 DIABETES MELLITUS WITH HYPOGLYCEMIA AND COMA, WITH LONG-TERM CURRENT USE OF INSULIN: ICD-10-CM

## 2022-09-13 DIAGNOSIS — Z91.89 AT RISK FOR READMISSION TO HOSPITAL: ICD-10-CM

## 2022-09-13 DIAGNOSIS — Z99.2 TYPE 2 DIABETES MELLITUS WITH CHRONIC KIDNEY DISEASE ON CHRONIC DIALYSIS, WITH LONG-TERM CURRENT USE OF INSULIN: ICD-10-CM

## 2022-09-13 DIAGNOSIS — N18.6 TYPE 2 DIABETES MELLITUS WITH CHRONIC KIDNEY DISEASE ON CHRONIC DIALYSIS, WITH LONG-TERM CURRENT USE OF INSULIN: ICD-10-CM

## 2022-09-13 DIAGNOSIS — R26.2 DIFFICULTY IN WALKING, NOT ELSEWHERE CLASSIFIED: ICD-10-CM

## 2022-09-13 DIAGNOSIS — N18.6 ESRD (END STAGE RENAL DISEASE): ICD-10-CM

## 2022-09-13 DIAGNOSIS — L97.423 DIABETIC ULCER OF LEFT MIDFOOT ASSOCIATED WITH TYPE 2 DIABETES MELLITUS, WITH NECROSIS OF MUSCLE: ICD-10-CM

## 2022-09-13 DIAGNOSIS — Z91.89 AT HIGH RISK FOR INADEQUATE NUTRITIONAL INTAKE: ICD-10-CM

## 2022-09-13 DIAGNOSIS — Z79.4 TYPE 2 DIABETES MELLITUS WITH CHRONIC KIDNEY DISEASE ON CHRONIC DIALYSIS, WITH LONG-TERM CURRENT USE OF INSULIN: ICD-10-CM

## 2022-09-13 DIAGNOSIS — R23.4 ESCHAR OF HEEL: ICD-10-CM

## 2022-09-13 DIAGNOSIS — L97.523 ULCER OF LEFT FOOT WITH NECROSIS OF MUSCLE: Primary | ICD-10-CM

## 2022-09-13 DIAGNOSIS — L08.9 DIABETIC FOOT INFECTION: ICD-10-CM

## 2022-09-13 DIAGNOSIS — E11.641 TYPE 2 DIABETES MELLITUS WITH HYPOGLYCEMIA AND COMA, WITH LONG-TERM CURRENT USE OF INSULIN: ICD-10-CM

## 2022-09-13 DIAGNOSIS — Z86.73 HISTORY OF TIA (TRANSIENT ISCHEMIC ATTACK): ICD-10-CM

## 2022-09-13 DIAGNOSIS — L97.519 ULCER OF RIGHT FOOT, UNSPECIFIED ULCER STAGE: ICD-10-CM

## 2022-09-13 DIAGNOSIS — E11.649 TYPE 2 DIABETES MELLITUS WITH HYPOGLYCEMIA UNAWARENESS: ICD-10-CM

## 2022-09-13 DIAGNOSIS — I15.2 HYPERTENSION ASSOCIATED WITH DIABETES: ICD-10-CM

## 2022-09-13 DIAGNOSIS — L97.423 HEEL ULCER, LEFT, WITH NECROSIS OF MUSCLE: ICD-10-CM

## 2022-09-13 DIAGNOSIS — I73.9 PERIPHERAL VASCULAR DISEASE: ICD-10-CM

## 2022-09-13 DIAGNOSIS — E11.621 DIABETIC ULCER OF LEFT MIDFOOT ASSOCIATED WITH TYPE 2 DIABETES MELLITUS, WITH NECROSIS OF MUSCLE: ICD-10-CM

## 2022-09-13 DIAGNOSIS — E11.628 DIABETIC FOOT INFECTION: ICD-10-CM

## 2022-09-13 DIAGNOSIS — Z89.432 HISTORY OF AMPUTATION OF LEFT FOOT: ICD-10-CM

## 2022-09-13 DIAGNOSIS — E11.22 TYPE 2 DIABETES MELLITUS WITH CHRONIC KIDNEY DISEASE ON CHRONIC DIALYSIS, WITH LONG-TERM CURRENT USE OF INSULIN: ICD-10-CM

## 2022-09-13 DIAGNOSIS — R09.89 DECREASED PEDAL PULSES: ICD-10-CM

## 2022-09-13 DIAGNOSIS — L97.529 ULCER OF TOE OF LEFT FOOT, UNSPECIFIED ULCER STAGE: ICD-10-CM

## 2022-09-13 DIAGNOSIS — E11.59 HYPERTENSION ASSOCIATED WITH DIABETES: ICD-10-CM

## 2022-09-13 PROCEDURE — 3066F NEPHROPATHY DOC TX: CPT | Mod: CPTII,,, | Performed by: PODIATRIST

## 2022-09-13 PROCEDURE — 1160F PR REVIEW ALL MEDS BY PRESCRIBER/CLIN PHARMACIST DOCUMENTED: ICD-10-PCS | Mod: CPTII,,, | Performed by: PODIATRIST

## 2022-09-13 PROCEDURE — 99214 PR OFFICE/OUTPT VISIT, EST, LEVL IV, 30-39 MIN: ICD-10-PCS | Mod: 24,25,, | Performed by: PODIATRIST

## 2022-09-13 PROCEDURE — 3079F PR MOST RECENT DIASTOLIC BLOOD PRESSURE 80-89 MM HG: ICD-10-PCS | Mod: CPTII,,, | Performed by: PODIATRIST

## 2022-09-13 PROCEDURE — 3051F HG A1C>EQUAL 7.0%<8.0%: CPT | Mod: CPTII,,, | Performed by: PODIATRIST

## 2022-09-13 PROCEDURE — 4010F ACE/ARB THERAPY RXD/TAKEN: CPT | Mod: CPTII,,, | Performed by: PODIATRIST

## 2022-09-13 PROCEDURE — 3077F SYST BP >= 140 MM HG: CPT | Mod: CPTII,,, | Performed by: PODIATRIST

## 2022-09-13 PROCEDURE — 3079F DIAST BP 80-89 MM HG: CPT | Mod: CPTII,,, | Performed by: PODIATRIST

## 2022-09-13 PROCEDURE — 11043 PR DEBRIDEMENT, SKIN, SUB-Q TISSUE,MUSCLE,=<20 SQ CM: ICD-10-PCS | Mod: 79,,, | Performed by: PODIATRIST

## 2022-09-13 PROCEDURE — 3051F PR MOST RECENT HEMOGLOBIN A1C LEVEL 7.0 - < 8.0%: ICD-10-PCS | Mod: CPTII,,, | Performed by: PODIATRIST

## 2022-09-13 PROCEDURE — 99214 OFFICE O/P EST MOD 30 MIN: CPT | Mod: 24,25,, | Performed by: PODIATRIST

## 2022-09-13 PROCEDURE — 4010F PR ACE/ARB THEARPY RXD/TAKEN: ICD-10-PCS | Mod: CPTII,,, | Performed by: PODIATRIST

## 2022-09-13 PROCEDURE — 11043 DBRDMT MUSC&/FSCA 1ST 20/<: CPT | Performed by: PODIATRIST

## 2022-09-13 PROCEDURE — 1160F RVW MEDS BY RX/DR IN RCRD: CPT | Mod: CPTII,,, | Performed by: PODIATRIST

## 2022-09-13 PROCEDURE — 1159F PR MEDICATION LIST DOCUMENTED IN MEDICAL RECORD: ICD-10-PCS | Mod: CPTII,,, | Performed by: PODIATRIST

## 2022-09-13 PROCEDURE — 11042 DBRDMT SUBQ TIS 1ST 20SQCM/<: CPT | Mod: 59 | Performed by: PODIATRIST

## 2022-09-13 PROCEDURE — 11043 DBRDMT MUSC&/FSCA 1ST 20/<: CPT | Mod: 79,,, | Performed by: PODIATRIST

## 2022-09-13 PROCEDURE — 1111F DSCHRG MED/CURRENT MED MERGE: CPT | Mod: CPTII,,, | Performed by: PODIATRIST

## 2022-09-13 PROCEDURE — 1111F PR DISCHARGE MEDS RECONCILED W/ CURRENT OUTPATIENT MED LIST: ICD-10-PCS | Mod: CPTII,,, | Performed by: PODIATRIST

## 2022-09-13 PROCEDURE — 3066F PR DOCUMENTATION OF TREATMENT FOR NEPHROPATHY: ICD-10-PCS | Mod: CPTII,,, | Performed by: PODIATRIST

## 2022-09-13 PROCEDURE — 11042 PR DEBRIDEMENT, SKIN, SUB-Q TISSUE,=<20 SQ CM: ICD-10-PCS | Mod: 59,79,, | Performed by: PODIATRIST

## 2022-09-13 PROCEDURE — 11042 DBRDMT SUBQ TIS 1ST 20SQCM/<: CPT | Mod: 59,79,, | Performed by: PODIATRIST

## 2022-09-13 PROCEDURE — 1159F MED LIST DOCD IN RCRD: CPT | Mod: CPTII,,, | Performed by: PODIATRIST

## 2022-09-13 PROCEDURE — 3077F PR MOST RECENT SYSTOLIC BLOOD PRESSURE >= 140 MM HG: ICD-10-PCS | Mod: CPTII,,, | Performed by: PODIATRIST

## 2022-09-13 NOTE — PROGRESS NOTES
[address]  [place] [address]  Phone: [phone]   [phone]    Patient's PCP:Stefano Lopez MD  Referring Provider: No ref. provider found    Subjective:      Chief Complaint:: Wound Care, Non-healing Wound, Pressure Ulcer, Diabetes, Diabetic Foot Ulcer (New ulcer, right dorsal foot), and Wound Check    HPI  Patient presents for follow-up of right and left diabetic foot ulcers.    Additionally, patient presents with a new diabetic foot ulcer located on the dorsal aspect of her right foot.    New wound occurred approximately 2 days ago according to patient and patient's  bedside.  See Wound docs for full assessment and evaluation.    Leanna García is a 57 y.o. female who presents today with a complaint of right foot ulcer, left midfoot ulcer, left heel ulcer, and left post amputation site of great toe.          1. Debridement of left midfoot ulcer.  Partial debridement of Left heel ulcer.  Evaluation and assessment only of right diabetic foot ulcers. See Wound Docs for assessment of wounds and procedure notes  2. Continue taking all medications as prescribed.  3. Continue home health dressing changes, wound vac to left plantar foot wound.  Betadine followed by bulky wrap compression to heel wound.  Betadine with border foam to right foot wound.   4. RTC one week   5. Counseled patient on increasing protein intake, not getting wound wet, keeping dressing clean dry and intact, following a healthy diet, elevating legs when able, removing pressure from wound     Total time spent for E&M 35  Total time for debridement 35 minutes            Patient Name: Leanna García  MRN: [id number]  Patient Class: IP- Inpatient  Admission Date: 8/18/2022  Hospital Length of Stay: 10 days  Discharge Date and Time: 8/30/2022  3:20 PM  Attending Physician: No att. providers found   Discharging Provider: Andrea Montoya MD  Primary Care Provider: Stefano Lopez MD     57-year-old   female with multiple medical comorbidities including but not limited to type 2 diabetes mellitus complicated by diabetic foot ulcer, ESRD on hemodialysis, hyperlipidemia presented to the ED with nausea and vomiting as well as redness involving the right foot wound.     Diagnosed with diabetic gastroparesis and symptoms resolved with metoclopramide.  She was recently discharged after prolonged LTAC stay for IV antibiotics of the left diabetic foot ulcer.  She was evaluated by Podiatry.  She underwent bedside excision debridement of the left midfoot on 08/19.  MRI revealed acute osteomyelitis of the left great toe involving proximal and distal phalanges.  This was followed by bone biopsy of the left hallux and excisional debridement with cultures growing Proteus mirabilis.  Eventually patient underwent amputation of the 1st toe and partial 1st metatarsal head amputation 8/26.  Seen by Infectious Disease.  Initially on broad-spectrum antibiotics and eventually de-escalated to intravenous cefepime which she will continue for 2 more weeks.     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  Fistulogram revealed large thrombus with aneurysms.  Underwent fistula repair in the OR.     Stable for discharge to home with resumption of home health.  Wound care orders sent.  Patient aware to follow-up with PCP, Podiatry and ID.  Discharge instructions and return precautions reviewed.     I have seen the patient on the day of discharge and reviewed the discharge instructions as outlined below. Patient verbalized understanding and is aware to contact primary care physician or return to ED if new or worsening symptoms.     I counseled the patient on their conditions, their implications and medical management.     >50% of this > 60 minute visit was spent face to face educating/counseling the patient     I spent a total of 60 minutes on the day of the visit.This includes face to  face time and non-face to face time preparing to see the patient (eg, review of tests), obtaining and/or reviewing separately obtained history, documenting clinical information in the electronic or other health record, independently interpreting results and communicating results to the patient/family/caregiver, or care coordinator.        Counseling/Education:  I provided patient education verbally regarding:   The aspects of diabetes and how it pertains to the feet. I explained the importance of proper diabetic foot care and how it is essential for the health of their feet.     I discussed the importance of knowing their Hemoglobin A1c and that the level needs to be as close to 6 as possible. I discussed the increase complications of high blood sugar including stroke, blindness, heart attack, kidney failure and loss of limb secondary to neuropathy and PVD.      With neuropathy, beware of any breaks in the skin or redness. These areas are not recognized early due to the numbness.     I discussed Diabetes, lower back issues, metabolic disorders, systemic causes, chemotherapy, vitamin deficiency, heavy metal exposure, as some of the causes. I also explained that as much as 40% of the time we can not find a cause. I discussed different treatments available to control the symptoms but which may not cure the problem.         Counseled patient on the aspects of diabetes and how it pertains to the feet.  I explained the importance of proper diabetic foot care and how it is essential for the health of their feet.        Shoe inspection. Patient instructed on proper foot hygeine. We discussed wearing proper shoe gear, daily foot inspections, never walking without protective shoe gear, never putting sharp instruments to feet, routine podiatric nail visits every 2-3 months.             Patient should call the office immediately if any signs of infection, such as fever, chills, sweats, increased redness or pain.     Patient was  "instructed to call the clinic or go to the emergency department if their symptoms do not improve, worsens, or if new symptoms develop.  Patient was advised that if any increased swelling, pain, or numbness arise to go immediately to the ED. Patient knows to call any time if an emergency arises. Shared decision making occurred and patient verbalized understanding in agreement with this plan.       Much of the documentation for this visit was completed in the Wound Docs system.  Please see the attached documentation for further details about the patient's care. Scanned under the Media tab.         Alivia Bruno DPM        Vitals:    09/13/22 1021   BP: (!) 191/88   Pulse: 83   Resp: 18   Temp: 98.6 °F (37 °C)   Weight: (P) 79.4 kg (175 lb)   Height: (P) 5' 9" (1.753 m)   PainSc: 0-No pain      Shoe Size:     Past Surgical History:   Procedure Laterality Date    AV FISTULA PLACEMENT Left 8/29/2022    Procedure: CREATION, AV FISTULA;  Surgeon: Ali Khoobehi, MD;  Location: [facility] OR;  Service: Vascular;  Laterality: Left;    BONE BIOPSY Left 8/24/2022    Procedure: Biopsy-Bone;  Surgeon: Porfirio Ponce DPM;  Location: [facility] OR;  Service: Podiatry;  Laterality: Left;    COLONOSCOPY N/A 10/5/2016    Procedure: COLONOSCOPY;  Surgeon: Pillo Chanel MD;  Location: [facility] ENDO;  Service: Endoscopy;  Laterality: N/A;    COLONOSCOPY N/A 4/26/2022    Procedure: COLONOSCOPY;  Surgeon: Saravanan Rachel MD;  Location: [facility] ENDO;  Service: Endoscopy;  Laterality: N/A;    FISTULOGRAM Left 8/24/2022    Procedure: Fistulogram;  Surgeon: Ali Khoobehi, MD;  Location: [facility] CATH/EP LAB;  Service: Vascular;  Laterality: Left;    HERNIA REPAIR Bilateral 11/22/2016    inguinal    HYSTERECTOMY      INCISION AND DRAINAGE FOOT Left 5/13/2022    Procedure: INCISION AND DRAINAGE, FOOT;  Surgeon: Ricardo Bruno DPM;  Location: [facility] OR;  Service: Podiatry;  Laterality: Left;    OOPHORECTOMY      THROMBECTOMY, AV FISTULA, UPPER EXTREMITY, " PERCUTANEOUS  8/24/2022    Procedure: THROMBECTOMY, AV FISTULA, UPPER EXTREMITY, PERCUTANEOUS;  Surgeon: Ali Khoobehi, MD;  Location: [facility] CATH/EP LAB;  Service: Vascular;;    TOE AMPUTATION Left 8/26/2022    Procedure: AMPUTATION, TOE;  Surgeon: Porfirio Ponce DPM;  Location: [facility] OR;  Service: Podiatry;  Laterality: Left;     Past Medical History:   Diagnosis Date    A-fib     resolved per patient    Anemia due to end stage renal disease 6/30/2022    Arthritis     Bronchitis     Cardiovascular event risk, ASCVD 10-year risk 6.7% 10/15/2016    Diabetes mellitus type II     Diabetic foot infection     Diabetic ulcer of left midfoot 05/05/2022    Disorder of kidney and ureter     Encounter for blood transfusion     ESRD (end stage renal disease) 05/18/2018    MANJINDER (generalized anxiety disorder) 10/25/2016    History of colon polyps 11/02/2016    Hyperlipidemia     Hypertension     Stroke      Family History   Problem Relation Age of Onset    Hyperlipidemia Mother     Hypertension Mother     Hypertension Father     Diabetes Father     Diabetes Sister     Diabetes Sister     Diabetes Maternal Aunt     Diabetes Maternal Grandmother     Heart disease Maternal Grandmother     Cancer Paternal Grandmother     Breast cancer Neg Hx     Colon cancer Neg Hx     Ovarian cancer Neg Hx         Social History:   Marital Status:   Alcohol History:  reports no history of alcohol use.  Tobacco History:  reports that she has never smoked. She has never used smokeless tobacco.  Drug History:  reports no history of drug use.    Review of patient's allergies indicates:   Allergen Reactions    Dilaudid [hydromorphone] Nausea And Vomiting    Chlorhexidine Itching    Lortab [hydrocodone-acetaminophen] Itching       Current Outpatient Medications   Medication Sig Dispense Refill    amLODIPine (NORVASC) 10 MG tablet Take 1 tablet (10 mg total) by mouth once daily.      aspirin (ECOTRIN) 81 MG EC tablet Take 81 mg by mouth once  daily.      atorvastatin (LIPITOR) 80 MG tablet Take 1 tablet (80 mg total) by mouth once daily. 90 tablet 3    azelastine (ASTELIN) 137 mcg (0.1 %) nasal spray 1 spray (137 mcg total) by Nasal route 2 (two) times a day. 30 mL 0    blood sugar diagnostic Strp To check BG 4 times daily, to use with insurance preferred meter (Patient taking differently: 1 strip by Misc.(Non-Drug; Combo Route) route 4 (four) times daily. To check BG 4 times daily, to use with insurance preferred meter) 450 strip 3    calcium acetate,phosphat bind, (PHOSLO) 667 mg capsule Take 1 capsule (667 mg total) by mouth 3 (three) times daily with meals.      cetirizine (ZYRTEC) 10 MG tablet Take 1 tablet (10 mg total) by mouth every other day.      clopidogreL (PLAVIX) 75 mg tablet Take 1 tablet (75 mg total) by mouth once daily. 30 tablet 0    epoetin chris-epbx (RETACRIT) 4,000 unit/mL injection Inject 1.11 mLs (4,440 Units total) into the skin every Mon, Wed, Fri.      fluticasone propionate (FLONASE) 50 mcg/actuation nasal spray 2 sprays (100 mcg total) by Each Nostril route once daily.      gabapentin (NEURONTIN) 100 MG capsule Take 1 capsule (100 mg total) by mouth 2 (two) times daily.      hydrALAZINE (APRESOLINE) 50 MG tablet Take 1 tablet (50 mg total) by mouth every 8 (eight) hours. 90 tablet 0    insulin aspart U-100 (NOVOLOG FLEXPEN U-100 INSULIN) 100 unit/mL (3 mL) InPn pen Inject 5-8 units 3 times per day with food. (Patient taking differently: Inject 5-8 Units into the skin 3 (three) times daily with meals. Inject 5-8 units 3 times per day with food.) 1 Box 6    insulin detemir U-100 (LEVEMIR FLEXTOUCH U-100 INSULN) 100 unit/mL (3 mL) InPn pen Inject 15 Units into the skin 2 (two) times daily.  0    lactulose (CHRONULAC) 10 gram/15 mL solution Take 20 g by mouth 2 (two) times a day.      losartan (COZAAR) 100 MG tablet Take 1 tablet (100 mg total) by mouth once daily.      ondansetron (ZOFRAN-ODT) 8 MG TbDL Take 1 tablet (8 mg  "total) by mouth every 8 (eight) hours as needed (nausea).      oxyCODONE (ROXICODONE) 5 MG immediate release tablet Take 1 tablet (5 mg total) by mouth every 6 (six) hours as needed for Pain.      pen needle, diabetic (BD ULTRA-FINE ROBERT PEN NEEDLE) 32 gauge x 5/32" Ndle To use 4 times per day with insulin injections. (Patient taking differently: 1 pen by Misc.(Non-Drug; Combo Route) route 4 (four) times daily. To use 4 times per day with insulin injections.) 450 each 2    polyethylene glycol (GLYCOLAX) 17 gram PwPk Take 17 g by mouth 2 (two) times daily as needed.      senna-docusate 8.6-50 mg (PERICOLACE) 8.6-50 mg per tablet Take 1 tablet by mouth 2 (two) times daily.       No current facility-administered medications for this visit.       Review of Systems   Constitutional:  Negative for chills, fatigue, fever and unexpected weight change.   HENT:  Negative for hearing loss and trouble swallowing.    Eyes:  Negative for photophobia and visual disturbance.   Respiratory:  Negative for cough, shortness of breath and wheezing.    Cardiovascular:  Negative for chest pain, palpitations and leg swelling.   Gastrointestinal:  Negative for abdominal pain and nausea.   Genitourinary:  Negative for dysuria and frequency.   Musculoskeletal:  Negative for arthralgias, back pain, gait problem, joint swelling and myalgias.   Skin:  Positive for wound. Negative for rash.   Neurological:  Negative for tremors, seizures, weakness, numbness and headaches.   Hematological:  Does not bruise/bleed easily.       Objective:        Physical Exam:   Foot Exam  Physical Exam  Ortho/SPM Exam     Imaging:            Assessment:       1. Ulcer of left foot with necrosis of muscle    2. Diabetic ulcer of left midfoot associated with type 2 diabetes mellitus, with necrosis of muscle    3. Ulcer of right foot, unspecified ulcer stage    4. Ulcer of toe of left foot, unspecified ulcer stage    5. Type 2 diabetes mellitus with hypoglycemia and " coma, with long-term current use of insulin    6. 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 History of TIA (transient ischemic attack)    8. At high risk for inadequate nutritional intake    9. Difficulty in walking, not elsewhere classified    10. Type 2 diabetes mellitus with chronic kidney disease on chronic dialysis, with long-term current use of insulin    11. Diabetic foot infection    12. ESRD (end stage renal disease)    13. History of amputation of left foot    14. Decreased pedal pulses    15. Peripheral vascular disease    16. At risk for readmission to hospital    17. Eschar of heel    18. Heel ulcer, left, with necrosis of muscle    19. Hypertension associated with diabetes      Plan:   Ulcer of left foot with necrosis of muscle    Diabetic ulcer of left midfoot associated with type 2 diabetes mellitus, with necrosis of muscle    Ulcer of right foot, unspecified ulcer stage    Ulcer of toe of left foot, unspecified ulcer stage    Type 2 diabetes mellitus with hypoglycemia and coma, with long-term current use of insulin    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    History of TIA (transient ischemic attack)    At high risk for inadequate nutritional intake    Difficulty in walking, not elsewhere classified    Type 2 diabetes mellitus with chronic kidney disease on chronic dialysis, with long-term current use of insulin    Diabetic foot infection    ESRD (end stage renal disease)    History of amputation of left foot    Decreased pedal pulses    Peripheral vascular disease    At risk for readmission to hospital    Eschar of heel    Heel ulcer, left, with necrosis of muscle    Hypertension associated with diabetes    Follow up in about 1 week (around 9/20/2022).    Procedures          Counseling:     I provided patient education verbally regarding:   Patient diagnosis, treatment options, as well as alternatives, risks, and benefits.     This note was created using Dragon  voice recognition software that occasionally misinterpreted phrases or words.

## 2022-09-15 ENCOUNTER — OFFICE VISIT (OUTPATIENT)
Dept: FAMILY MEDICINE | Facility: CLINIC | Age: 57
End: 2022-09-15
Payer: MEDICARE

## 2022-09-15 ENCOUNTER — TELEPHONE (OUTPATIENT)
Dept: FAMILY MEDICINE | Facility: CLINIC | Age: 57
End: 2022-09-15
Payer: MEDICAID

## 2022-09-15 VITALS
DIASTOLIC BLOOD PRESSURE: 78 MMHG | HEIGHT: 69 IN | WEIGHT: 184.5 LBS | OXYGEN SATURATION: 97 % | BODY MASS INDEX: 27.33 KG/M2 | TEMPERATURE: 98 F | SYSTOLIC BLOOD PRESSURE: 112 MMHG | HEART RATE: 85 BPM

## 2022-09-15 DIAGNOSIS — I10 HYPERTENSION, UNSPECIFIED TYPE: ICD-10-CM

## 2022-09-15 DIAGNOSIS — Z86.73 HISTORY OF TIA (TRANSIENT ISCHEMIC ATTACK): ICD-10-CM

## 2022-09-15 DIAGNOSIS — J30.89 ALLERGIC RHINITIS DUE TO OTHER ALLERGIC TRIGGER, UNSPECIFIED SEASONALITY: ICD-10-CM

## 2022-09-15 DIAGNOSIS — R09.89 BRUIT OF LEFT CAROTID ARTERY: ICD-10-CM

## 2022-09-15 DIAGNOSIS — Z99.2 ESRD (END STAGE RENAL DISEASE) ON DIALYSIS: ICD-10-CM

## 2022-09-15 DIAGNOSIS — I51.89 DIASTOLIC DYSFUNCTION: ICD-10-CM

## 2022-09-15 DIAGNOSIS — N18.6 ESRD (END STAGE RENAL DISEASE) ON DIALYSIS: ICD-10-CM

## 2022-09-15 DIAGNOSIS — I95.9 HYPOTENSION, UNSPECIFIED HYPOTENSION TYPE: ICD-10-CM

## 2022-09-15 DIAGNOSIS — E11.69 HYPERLIPIDEMIA ASSOCIATED WITH TYPE 2 DIABETES MELLITUS: ICD-10-CM

## 2022-09-15 DIAGNOSIS — Z12.31 ENCOUNTER FOR SCREENING MAMMOGRAM FOR MALIGNANT NEOPLASM OF BREAST: ICD-10-CM

## 2022-09-15 DIAGNOSIS — E78.5 HYPERLIPIDEMIA ASSOCIATED WITH TYPE 2 DIABETES MELLITUS: ICD-10-CM

## 2022-09-15 DIAGNOSIS — E78.00 HYPERCHOLESTEREMIA: ICD-10-CM

## 2022-09-15 DIAGNOSIS — E11.65 TYPE 2 DIABETES MELLITUS WITH HYPERGLYCEMIA, WITHOUT LONG-TERM CURRENT USE OF INSULIN: Primary | ICD-10-CM

## 2022-09-15 DIAGNOSIS — R25.1 TREMOR: ICD-10-CM

## 2022-09-15 DIAGNOSIS — E11.49 OTHER DIABETIC NEUROLOGICAL COMPLICATION ASSOCIATED WITH TYPE 2 DIABETES MELLITUS: ICD-10-CM

## 2022-09-15 PROCEDURE — 4010F PR ACE/ARB THEARPY RXD/TAKEN: ICD-10-PCS | Mod: CPTII,S$GLB,, | Performed by: PHYSICIAN ASSISTANT

## 2022-09-15 PROCEDURE — 3051F HG A1C>EQUAL 7.0%<8.0%: CPT | Mod: CPTII,S$GLB,, | Performed by: PHYSICIAN ASSISTANT

## 2022-09-15 PROCEDURE — 3008F PR BODY MASS INDEX (BMI) DOCUMENTED: ICD-10-PCS | Mod: CPTII,S$GLB,, | Performed by: PHYSICIAN ASSISTANT

## 2022-09-15 PROCEDURE — 3066F PR DOCUMENTATION OF TREATMENT FOR NEPHROPATHY: ICD-10-PCS | Mod: CPTII,S$GLB,, | Performed by: PHYSICIAN ASSISTANT

## 2022-09-15 PROCEDURE — 99999 PR PBB SHADOW E&M-EST. PATIENT-LVL V: ICD-10-PCS | Mod: PBBFAC,,, | Performed by: PHYSICIAN ASSISTANT

## 2022-09-15 PROCEDURE — 3066F NEPHROPATHY DOC TX: CPT | Mod: CPTII,S$GLB,, | Performed by: PHYSICIAN ASSISTANT

## 2022-09-15 PROCEDURE — 1111F PR DISCHARGE MEDS RECONCILED W/ CURRENT OUTPATIENT MED LIST: ICD-10-PCS | Mod: CPTII,S$GLB,, | Performed by: PHYSICIAN ASSISTANT

## 2022-09-15 PROCEDURE — 99214 PR OFFICE/OUTPT VISIT, EST, LEVL IV, 30-39 MIN: ICD-10-PCS | Mod: S$GLB,,, | Performed by: PHYSICIAN ASSISTANT

## 2022-09-15 PROCEDURE — 4010F ACE/ARB THERAPY RXD/TAKEN: CPT | Mod: CPTII,S$GLB,, | Performed by: PHYSICIAN ASSISTANT

## 2022-09-15 PROCEDURE — 3078F PR MOST RECENT DIASTOLIC BLOOD PRESSURE < 80 MM HG: ICD-10-PCS | Mod: CPTII,S$GLB,, | Performed by: PHYSICIAN ASSISTANT

## 2022-09-15 PROCEDURE — 1159F PR MEDICATION LIST DOCUMENTED IN MEDICAL RECORD: ICD-10-PCS | Mod: CPTII,S$GLB,, | Performed by: PHYSICIAN ASSISTANT

## 2022-09-15 PROCEDURE — 1111F DSCHRG MED/CURRENT MED MERGE: CPT | Mod: CPTII,S$GLB,, | Performed by: PHYSICIAN ASSISTANT

## 2022-09-15 PROCEDURE — 3074F SYST BP LT 130 MM HG: CPT | Mod: CPTII,S$GLB,, | Performed by: PHYSICIAN ASSISTANT

## 2022-09-15 PROCEDURE — 3051F PR MOST RECENT HEMOGLOBIN A1C LEVEL 7.0 - < 8.0%: ICD-10-PCS | Mod: CPTII,S$GLB,, | Performed by: PHYSICIAN ASSISTANT

## 2022-09-15 PROCEDURE — 3078F DIAST BP <80 MM HG: CPT | Mod: CPTII,S$GLB,, | Performed by: PHYSICIAN ASSISTANT

## 2022-09-15 PROCEDURE — 99214 OFFICE O/P EST MOD 30 MIN: CPT | Mod: S$GLB,,, | Performed by: PHYSICIAN ASSISTANT

## 2022-09-15 PROCEDURE — 3074F PR MOST RECENT SYSTOLIC BLOOD PRESSURE < 130 MM HG: ICD-10-PCS | Mod: CPTII,S$GLB,, | Performed by: PHYSICIAN ASSISTANT

## 2022-09-15 PROCEDURE — 3008F BODY MASS INDEX DOCD: CPT | Mod: CPTII,S$GLB,, | Performed by: PHYSICIAN ASSISTANT

## 2022-09-15 PROCEDURE — 99999 PR PBB SHADOW E&M-EST. PATIENT-LVL V: CPT | Mod: PBBFAC,,, | Performed by: PHYSICIAN ASSISTANT

## 2022-09-15 PROCEDURE — 1159F MED LIST DOCD IN RCRD: CPT | Mod: CPTII,S$GLB,, | Performed by: PHYSICIAN ASSISTANT

## 2022-09-15 RX ORDER — OXYBUTYNIN CHLORIDE 5 MG/1
5 TABLET ORAL
COMMUNITY
Start: 2022-06-10 | End: 2022-10-17

## 2022-09-15 RX ORDER — INSULIN ASPART 100 [IU]/ML
5 INJECTION, SOLUTION INTRAVENOUS; SUBCUTANEOUS
Qty: 3 ML | Refills: 6 | Status: SHIPPED | OUTPATIENT
Start: 2022-09-15 | End: 2022-11-08 | Stop reason: SDUPTHER

## 2022-09-15 RX ORDER — CLOPIDOGREL BISULFATE 75 MG/1
75 TABLET ORAL DAILY
Qty: 90 TABLET | Refills: 3 | Status: SHIPPED | OUTPATIENT
Start: 2022-09-15 | End: 2022-11-08 | Stop reason: SDUPTHER

## 2022-09-15 RX ORDER — ATORVASTATIN CALCIUM 80 MG/1
80 TABLET, FILM COATED ORAL DAILY
Qty: 90 TABLET | Refills: 3 | Status: SHIPPED | OUTPATIENT
Start: 2022-09-15 | End: 2023-12-12 | Stop reason: SDUPTHER

## 2022-09-15 RX ORDER — AZELASTINE 1 MG/ML
1 SPRAY, METERED NASAL 2 TIMES DAILY
Qty: 30 ML | Refills: 2 | Status: SHIPPED | OUTPATIENT
Start: 2022-09-15 | End: 2022-11-02

## 2022-09-15 RX ORDER — FLUTICASONE PROPIONATE 50 MCG
2 SPRAY, SUSPENSION (ML) NASAL DAILY
Qty: 16 G | Refills: 3 | Status: SHIPPED | OUTPATIENT
Start: 2022-09-15 | End: 2024-01-22

## 2022-09-15 RX ORDER — GABAPENTIN 100 MG/1
100 CAPSULE ORAL 2 TIMES DAILY
Qty: 180 CAPSULE | Refills: 3 | Status: SHIPPED | OUTPATIENT
Start: 2022-09-15 | End: 2024-01-29

## 2022-09-15 RX ORDER — INSULIN DETEMIR 100 [IU]/ML
15 INJECTION, SOLUTION SUBCUTANEOUS 2 TIMES DAILY
Qty: 27 ML | Refills: 3 | Status: ON HOLD | OUTPATIENT
Start: 2022-09-15 | End: 2022-10-20 | Stop reason: HOSPADM

## 2022-09-15 RX ORDER — CETIRIZINE HYDROCHLORIDE 10 MG/1
10 TABLET ORAL EVERY OTHER DAY
Qty: 45 TABLET | Refills: 3 | Status: SHIPPED | OUTPATIENT
Start: 2022-09-15 | End: 2022-11-08 | Stop reason: SDUPTHER

## 2022-09-15 RX ORDER — HYDRALAZINE HYDROCHLORIDE 50 MG/1
50 TABLET, FILM COATED ORAL EVERY 12 HOURS
Qty: 180 TABLET | Refills: 3 | Status: SHIPPED | OUTPATIENT
Start: 2022-09-15 | End: 2022-10-17

## 2022-09-15 NOTE — PROGRESS NOTES
"Subjective:       Patient ID: Leanna García is a 57 y.o. female.    Chief Complaint: Hospital Follow Up  Patient with poorly controlled type 2 diabetes on insulin, end-stage renal disease on dialysis, hypertension, hyperlipidemia, diabetic neuropathy and history of TIA presents for routine follow-up.  She was recently hospitalized   Per d/c summary "Hospital Course:   57-year-old  female with multiple medical comorbidities including but not limited to type 2 diabetes mellitus complicated by diabetic foot ulcer, ESRD on hemodialysis, hyperlipidemia presented to the ED with nausea and vomiting as well as redness involving the right foot wound.     Diagnosed with diabetic gastroparesis and symptoms resolved with metoclopramide.  She was recently discharged after prolonged LTAC stay for IV antibiotics of the left diabetic foot ulcer.  She was evaluated by Podiatry.  She underwent bedside excision debridement of the left midfoot on 08/19.  MRI revealed acute osteomyelitis of the left great toe involving proximal and distal phalanges.  This was followed by bone biopsy of the left hallux and excisional debridement with cultures growing Proteus mirabilis.  Eventually patient underwent amputation of the 1st toe and partial 1st metatarsal head amputation 8/26.  Seen by Infectious Disease.  Initially on broad-spectrum antibiotics and eventually de-escalated to intravenous cefepime which she will continue for 2 more weeks.     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  Fistulogram revealed large thrombus with aneurysms.  Underwent fistula repair in the OR."    She has home health for wound care and has followed up with podiatry as out patient already.  Has wound vac in place.  Has follow up with ID scheduled.     Complains of low bp readings on few occasions (after dialysis on one occasion) BP systolic <100 and felt lightheaded.  Took salt and improved.  "     Previously saw endocrinology for DM and requests referral          Review of Systems   Constitutional:  Negative for fever.   Cardiovascular:  Negative for chest pain, palpitations and leg swelling.   Gastrointestinal:  Negative for abdominal pain, diarrhea, nausea and vomiting.   Genitourinary:  Positive for decreased urine volume.   Musculoskeletal:  Positive for gait problem.   Skin:  Positive for wound.   Neurological:  Positive for tremors. Negative for dizziness and light-headedness.     Objective:      Physical Exam  Constitutional:       General: She is awake. She is not in acute distress.     Appearance: Normal appearance. She is well-developed. She is not ill-appearing, toxic-appearing or diaphoretic.      Comments: Seated on rolling walker seat  Wound vac in place   Both feet dressed clean and with post surgical podiatry shoes    HENT:      Head: Normocephalic and atraumatic.   Neck:      Vascular: Carotid bruit present.   Cardiovascular:      Rate and Rhythm: Normal rate and regular rhythm.      Heart sounds: Heart sounds are distant. Murmur heard.   Systolic murmur is present with a grade of 3/6.   Pulmonary:      Effort: Pulmonary effort is normal.      Breath sounds: Normal breath sounds.   Musculoskeletal:      Right lower leg: No edema.      Left lower leg: No edema.   Skin:     General: Skin is warm and dry.   Neurological:      Mental Status: She is alert and oriented to person, place, and time.      Motor: Tremor present.   Psychiatric:         Behavior: Behavior is cooperative.       Assessment:       1. Type 2 diabetes mellitus with hyperglycemia, without long-term current use of insulin    2. ESRD (end stage renal disease) on dialysis    3. Encounter for screening mammogram for malignant neoplasm of breast    4. Tremor    5. History of TIA (transient ischemic attack)    6. Hyperlipidemia associated with type 2 diabetes mellitus    7. Hypotension, unspecified hypotension type    8. Bruit of  left carotid artery    9. Diastolic dysfunction    10. Hypercholesteremia    11. Allergic rhinitis due to other allergic trigger, unspecified seasonality    12. Hypertension, unspecified type    13. Other diabetic neurological complication associated with type 2 diabetes mellitus          Plan:       Leanna was seen today for hospital follow up.    Diagnoses and all orders for this visit:    Type 2 diabetes mellitus with hyperglycemia, without long-term current use of insulin  -     Ambulatory referral/consult to Optometry; Future  -     Ambulatory referral/consult to Endocrinology; Future  -     insulin aspart U-100 (NOVOLOG FLEXPEN U-100 INSULIN) 100 unit/mL (3 mL) InPn pen; Inject 5 Units into the skin 3 (three) times daily with meals.  -     insulin detemir U-100 (LEVEMIR FLEXTOUCH U-100 INSULN) 100 unit/mL (3 mL) InPn pen; Inject 15 Units into the skin 2 (two) times daily.    ESRD (end stage renal disease) on dialysis  Continue per nephro     Encounter for screening mammogram for malignant neoplasm of breast  -     Mammo Digital Screening Bilat; Future    Tremor  -     Ambulatory referral/consult to Neurology; Future    History of TIA (transient ischemic attack)  -     Ambulatory referral/consult to Neurology; Future  -     atorvastatin (LIPITOR) 80 MG tablet; Take 1 tablet (80 mg total) by mouth once daily.  -     clopidogreL (PLAVIX) 75 mg tablet; Take 1 tablet (75 mg total) by mouth once daily.    Hyperlipidemia associated with type 2 diabetes mellitus  -     atorvastatin (LIPITOR) 80 MG tablet; Take 1 tablet (80 mg total) by mouth once daily.    Hypotension, unspecified hypotension type  Decrease hydralizine to bid     Bruit of left carotid artery  -     US Carotid Bilateral; Future    Diastolic dysfunction    Hypercholesteremia  -     atorvastatin (LIPITOR) 80 MG tablet; Take 1 tablet (80 mg total) by mouth once daily.    Allergic rhinitis due to other allergic trigger, unspecified seasonality  -      "azelastine (ASTELIN) 137 mcg (0.1 %) nasal spray; 1 spray (137 mcg total) by Nasal route 2 (two) times a day.  -     cetirizine (ZYRTEC) 10 MG tablet; Take 1 tablet (10 mg total) by mouth every other day.  -     fluticasone propionate (FLONASE) 50 mcg/actuation nasal spray; 2 sprays (100 mcg total) by Each Nostril route once daily.    Hypertension, unspecified type  -   reduce   hydrALAZINE (APRESOLINE) 50 MG tablet; Take 1 tablet (50 mg total) by mouth every 12 (twelve) hours.    Other diabetic neurological complication associated with type 2 diabetes mellitus  -     gabapentin (NEURONTIN) 100 MG capsule; Take 1 capsule (100 mg total) by mouth 2 (two) times daily.         Follow up for pcp in 1 mo , neuro, endo, mammo, optom , us carotid .           Documentation entered by me for this encounter may have been done in part using speech-recognition technology. Although I have made an effort to ensure accuracy, "sound like" errors may exist and should be interpreted in context.   I spent a total of 43 minutes on the day of the visit.This includes face to face time and non-face to face time preparing to see the patient (eg, review of tests), obtaining and/or reviewing separately obtained history, documenting clinical information in the electronic or other health record, independently interpreting results and communicating results to the patient/family/caregiver, or care coordinator.      "

## 2022-09-16 ENCOUNTER — TELEPHONE (OUTPATIENT)
Dept: FAMILY MEDICINE | Facility: CLINIC | Age: 57
End: 2022-09-16
Payer: MEDICAID

## 2022-09-20 ENCOUNTER — OFFICE VISIT (OUTPATIENT)
Dept: WOUND CARE | Facility: HOSPITAL | Age: 57
End: 2022-09-20
Attending: PODIATRIST
Payer: MEDICARE

## 2022-09-20 ENCOUNTER — TELEPHONE (OUTPATIENT)
Dept: ENDOCRINOLOGY | Facility: CLINIC | Age: 57
End: 2022-09-20
Payer: MEDICAID

## 2022-09-20 VITALS
HEART RATE: 78 BPM | TEMPERATURE: 99 F | DIASTOLIC BLOOD PRESSURE: 80 MMHG | SYSTOLIC BLOOD PRESSURE: 187 MMHG | RESPIRATION RATE: 18 BRPM

## 2022-09-20 DIAGNOSIS — Z86.73 HISTORY OF TIA (TRANSIENT ISCHEMIC ATTACK): ICD-10-CM

## 2022-09-20 DIAGNOSIS — Z89.432 HISTORY OF AMPUTATION OF LEFT FOOT: ICD-10-CM

## 2022-09-20 DIAGNOSIS — L97.423 HEEL ULCER, LEFT, WITH NECROSIS OF MUSCLE: ICD-10-CM

## 2022-09-20 DIAGNOSIS — I73.9 PERIPHERAL VASCULAR DISEASE: ICD-10-CM

## 2022-09-20 DIAGNOSIS — E08.621 DIABETIC ULCER OF OTHER PART OF RIGHT FOOT ASSOCIATED WITH DIABETES MELLITUS DUE TO UNDERLYING CONDITION, UNSPECIFIED ULCER STAGE: ICD-10-CM

## 2022-09-20 DIAGNOSIS — E11.22 TYPE 2 DIABETES MELLITUS WITH CHRONIC KIDNEY DISEASE ON CHRONIC DIALYSIS, WITH LONG-TERM CURRENT USE OF INSULIN: ICD-10-CM

## 2022-09-20 DIAGNOSIS — L97.519 ULCER OF RIGHT FOOT, UNSPECIFIED ULCER STAGE: ICD-10-CM

## 2022-09-20 DIAGNOSIS — R60.0 BILATERAL LOWER EXTREMITY EDEMA: ICD-10-CM

## 2022-09-20 DIAGNOSIS — Z79.4 TYPE 2 DIABETES MELLITUS WITH CHRONIC KIDNEY DISEASE ON CHRONIC DIALYSIS, WITH LONG-TERM CURRENT USE OF INSULIN: ICD-10-CM

## 2022-09-20 DIAGNOSIS — L08.9 DIABETIC FOOT INFECTION: ICD-10-CM

## 2022-09-20 DIAGNOSIS — E11.628 DIABETIC FOOT INFECTION: ICD-10-CM

## 2022-09-20 DIAGNOSIS — E11.649 TYPE 2 DIABETES MELLITUS WITH HYPOGLYCEMIA UNAWARENESS: ICD-10-CM

## 2022-09-20 DIAGNOSIS — L97.523 ULCER OF LEFT FOOT WITH NECROSIS OF MUSCLE: ICD-10-CM

## 2022-09-20 DIAGNOSIS — Z99.2 TYPE 2 DIABETES MELLITUS WITH CHRONIC KIDNEY DISEASE ON CHRONIC DIALYSIS, WITH LONG-TERM CURRENT USE OF INSULIN: ICD-10-CM

## 2022-09-20 DIAGNOSIS — R23.4 ESCHAR OF HEEL: ICD-10-CM

## 2022-09-20 DIAGNOSIS — Z91.89 AT RISK FOR READMISSION TO HOSPITAL: ICD-10-CM

## 2022-09-20 DIAGNOSIS — L97.529 ULCER OF TOE OF LEFT FOOT, UNSPECIFIED ULCER STAGE: ICD-10-CM

## 2022-09-20 DIAGNOSIS — Z91.89 AT HIGH RISK FOR INADEQUATE NUTRITIONAL INTAKE: ICD-10-CM

## 2022-09-20 DIAGNOSIS — R26.2 DIFFICULTY IN WALKING, NOT ELSEWHERE CLASSIFIED: ICD-10-CM

## 2022-09-20 DIAGNOSIS — R09.89 DECREASED PEDAL PULSES: ICD-10-CM

## 2022-09-20 DIAGNOSIS — N18.6 TYPE 2 DIABETES MELLITUS WITH CHRONIC KIDNEY DISEASE ON CHRONIC DIALYSIS, WITH LONG-TERM CURRENT USE OF INSULIN: ICD-10-CM

## 2022-09-20 DIAGNOSIS — N18.6 ESRD (END STAGE RENAL DISEASE): ICD-10-CM

## 2022-09-20 DIAGNOSIS — L97.423 DIABETIC ULCER OF LEFT MIDFOOT ASSOCIATED WITH TYPE 2 DIABETES MELLITUS, WITH NECROSIS OF MUSCLE: Primary | ICD-10-CM

## 2022-09-20 DIAGNOSIS — L97.519 DIABETIC ULCER OF OTHER PART OF RIGHT FOOT ASSOCIATED WITH DIABETES MELLITUS DUE TO UNDERLYING CONDITION, UNSPECIFIED ULCER STAGE: ICD-10-CM

## 2022-09-20 DIAGNOSIS — E11.621 DIABETIC ULCER OF LEFT MIDFOOT ASSOCIATED WITH TYPE 2 DIABETES MELLITUS, WITH NECROSIS OF MUSCLE: Primary | ICD-10-CM

## 2022-09-20 DIAGNOSIS — L03.119 CELLULITIS AND ABSCESS OF FOOT: ICD-10-CM

## 2022-09-20 DIAGNOSIS — L02.619 CELLULITIS AND ABSCESS OF FOOT: ICD-10-CM

## 2022-09-20 DIAGNOSIS — E11.641 TYPE 2 DIABETES MELLITUS WITH HYPOGLYCEMIA AND COMA, WITH LONG-TERM CURRENT USE OF INSULIN: ICD-10-CM

## 2022-09-20 DIAGNOSIS — Z79.4 TYPE 2 DIABETES MELLITUS WITH HYPOGLYCEMIA AND COMA, WITH LONG-TERM CURRENT USE OF INSULIN: ICD-10-CM

## 2022-09-20 PROCEDURE — 3079F PR MOST RECENT DIASTOLIC BLOOD PRESSURE 80-89 MM HG: ICD-10-PCS | Mod: CPTII,,, | Performed by: PODIATRIST

## 2022-09-20 PROCEDURE — 3077F PR MOST RECENT SYSTOLIC BLOOD PRESSURE >= 140 MM HG: ICD-10-PCS | Mod: CPTII,,, | Performed by: PODIATRIST

## 2022-09-20 PROCEDURE — 3079F DIAST BP 80-89 MM HG: CPT | Mod: CPTII,,, | Performed by: PODIATRIST

## 2022-09-20 PROCEDURE — 4010F ACE/ARB THERAPY RXD/TAKEN: CPT | Mod: CPTII,,, | Performed by: PODIATRIST

## 2022-09-20 PROCEDURE — 3077F SYST BP >= 140 MM HG: CPT | Mod: CPTII,,, | Performed by: PODIATRIST

## 2022-09-20 PROCEDURE — 99213 OFFICE O/P EST LOW 20 MIN: CPT | Mod: 25 | Performed by: PODIATRIST

## 2022-09-20 PROCEDURE — 4010F PR ACE/ARB THEARPY RXD/TAKEN: ICD-10-PCS | Mod: CPTII,,, | Performed by: PODIATRIST

## 2022-09-20 PROCEDURE — 3051F PR MOST RECENT HEMOGLOBIN A1C LEVEL 7.0 - < 8.0%: ICD-10-PCS | Mod: CPTII,,, | Performed by: PODIATRIST

## 2022-09-20 PROCEDURE — 1111F DSCHRG MED/CURRENT MED MERGE: CPT | Mod: CPTII,,, | Performed by: PODIATRIST

## 2022-09-20 PROCEDURE — 99214 PR OFFICE/OUTPT VISIT, EST, LEVL IV, 30-39 MIN: ICD-10-PCS | Mod: 25,24,, | Performed by: PODIATRIST

## 2022-09-20 PROCEDURE — 1160F RVW MEDS BY RX/DR IN RCRD: CPT | Mod: CPTII,,, | Performed by: PODIATRIST

## 2022-09-20 PROCEDURE — 11043 PR DEBRIDEMENT, SKIN, SUB-Q TISSUE,MUSCLE,=<20 SQ CM: ICD-10-PCS | Mod: XS,,, | Performed by: PODIATRIST

## 2022-09-20 PROCEDURE — 1160F PR REVIEW ALL MEDS BY PRESCRIBER/CLIN PHARMACIST DOCUMENTED: ICD-10-PCS | Mod: CPTII,,, | Performed by: PODIATRIST

## 2022-09-20 PROCEDURE — 1159F MED LIST DOCD IN RCRD: CPT | Mod: CPTII,,, | Performed by: PODIATRIST

## 2022-09-20 PROCEDURE — 3066F NEPHROPATHY DOC TX: CPT | Mod: CPTII,,, | Performed by: PODIATRIST

## 2022-09-20 PROCEDURE — 1111F PR DISCHARGE MEDS RECONCILED W/ CURRENT OUTPATIENT MED LIST: ICD-10-PCS | Mod: CPTII,,, | Performed by: PODIATRIST

## 2022-09-20 PROCEDURE — 3066F PR DOCUMENTATION OF TREATMENT FOR NEPHROPATHY: ICD-10-PCS | Mod: CPTII,,, | Performed by: PODIATRIST

## 2022-09-20 PROCEDURE — 99214 OFFICE O/P EST MOD 30 MIN: CPT | Mod: 25,24,, | Performed by: PODIATRIST

## 2022-09-20 PROCEDURE — 11043 DBRDMT MUSC&/FSCA 1ST 20/<: CPT | Mod: XS,,, | Performed by: PODIATRIST

## 2022-09-20 PROCEDURE — 3051F HG A1C>EQUAL 7.0%<8.0%: CPT | Mod: CPTII,,, | Performed by: PODIATRIST

## 2022-09-20 PROCEDURE — 1159F PR MEDICATION LIST DOCUMENTED IN MEDICAL RECORD: ICD-10-PCS | Mod: CPTII,,, | Performed by: PODIATRIST

## 2022-09-20 PROCEDURE — 11043 DBRDMT MUSC&/FSCA 1ST 20/<: CPT | Performed by: PODIATRIST

## 2022-09-20 NOTE — PROGRESS NOTES
[address] [facility] [address]  [place] [address]  Phone: [phone]   [phone]    Patient's PCP:Stefano Lopez MD  Referring Provider: No ref. provider found    Subjective:      Chief Complaint:: Wound Care, Wound Infection, Non-healing Wound, Pressure Ulcer, Wound Check, Diabetes, and Diabetic Foot Ulcer    HPI      Patient presents for follow-up of right and left diabetic foot ulcers.   Multiple ulcers present on both right and left foot.  See Wound docs for full assessments and evaluations of all wounds        Leanna García is a 57 y.o. female who presents today with a complaint of right foot ulcer, left midfoot ulcer, left heel ulcer, and left post amputation site of great toe.            1. Debridement of left midfoot ulcer.   Evaluation and assessment only of all remaining ulcers on both right and left foot. See Wound Docs for assessment of wounds and procedure notes  2. Continue taking all medications as prescribed.  3. Continue home health dressing changes, wound vac to left plantar foot wound.  Betadine followed by bulky wrap compression to heel wound.  Betadine with border foam to right foot wound.   4. RTC one week   5. Counseled patient on increasing protein intake, not getting wound wet, keeping dressing clean dry and intact, following a healthy diet, elevating legs when able, removing pressure from wound     Total time spent for E&M 35  Total time for debridement 35 minutes            Patient Name: Leanna García  MRN: [id number]  Patient Class: IP- Inpatient  Admission Date: 8/18/2022  Hospital Length of Stay: 10 days  Discharge Date and Time: 8/30/2022  3:20 PM  Attending Physician: No att. providers found   Discharging Provider: Andrea Montoya MD  Primary Care Provider: Stefano Lopez MD     57-year-old  female with multiple medical comorbidities including but not limited to type 2 diabetes mellitus complicated by diabetic foot ulcer, ESRD on hemodialysis,  hyperlipidemia presented to the ED with nausea and vomiting as well as redness involving the right foot wound.     Diagnosed with diabetic gastroparesis and symptoms resolved with metoclopramide.  She was recently discharged after prolonged LTAC stay for IV antibiotics of the left diabetic foot ulcer.  She was evaluated by Podiatry.  She underwent bedside excision debridement of the left midfoot on 08/19.  MRI revealed acute osteomyelitis of the left great toe involving proximal and distal phalanges.  This was followed by bone biopsy of the left hallux and excisional debridement with cultures growing Proteus mirabilis.  Eventually patient underwent amputation of the 1st toe and partial 1st metatarsal head amputation 8/26.  Seen by Infectious Disease.  Initially on broad-spectrum antibiotics and eventually de-escalated to intravenous cefepime which she will continue for 2 more weeks.     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  Fistulogram revealed large thrombus with aneurysms.  Underwent fistula repair in the OR.     Stable for discharge to home with resumption of home health.  Wound care orders sent.  Patient aware to follow-up with PCP, Podiatry and ID.  Discharge instructions and return precautions reviewed.     I have seen the patient on the day of discharge and reviewed the discharge instructions as outlined below. Patient verbalized understanding and is aware to contact primary care physician or return to ED if new or worsening symptoms.     I counseled the patient on their conditions, their implications and medical management.     >50% of this > 60 minute visit was spent face to face educating/counseling the patient     I spent a total of 60 minutes on the day of the visit.This includes face to face time and non-face to face time preparing to see the patient (eg, review of tests), obtaining and/or reviewing separately obtained history, documenting  clinical information in the electronic or other health record, independently interpreting results and communicating results to the patient/family/caregiver, or care coordinator.        Counseling/Education:  I provided patient education verbally regarding:   The aspects of diabetes and how it pertains to the feet. I explained the importance of proper diabetic foot care and how it is essential for the health of their feet.     I discussed the importance of knowing their Hemoglobin A1c and that the level needs to be as close to 6 as possible. I discussed the increase complications of high blood sugar including stroke, blindness, heart attack, kidney failure and loss of limb secondary to neuropathy and PVD.      With neuropathy, beware of any breaks in the skin or redness. These areas are not recognized early due to the numbness.     I discussed Diabetes, lower back issues, metabolic disorders, systemic causes, chemotherapy, vitamin deficiency, heavy metal exposure, as some of the causes. I also explained that as much as 40% of the time we can not find a cause. I discussed different treatments available to control the symptoms but which may not cure the problem.         Counseled patient on the aspects of diabetes and how it pertains to the feet.  I explained the importance of proper diabetic foot care and how it is essential for the health of their feet.        Shoe inspection. Patient instructed on proper foot hygeine. We discussed wearing proper shoe gear, daily foot inspections, never walking without protective shoe gear, never putting sharp instruments to feet, routine podiatric nail visits every 2-3 months.             Patient should call the office immediately if any signs of infection, such as fever, chills, sweats, increased redness or pain.     Patient was instructed to call the clinic or go to the emergency department if their symptoms do not improve, worsens, or if new symptoms develop.  Patient was advised  that if any increased swelling, pain, or numbness arise to go immediately to the ED. Patient knows to call any time if an emergency arises. Shared decision making occurred and patient verbalized understanding in agreement with this plan.       Much of the documentation for this visit was completed in the Wound Docs system.  Please see the attached documentation for further details about the patient's care. Scanned under the Media tab.         Alivia Bruno DPM        Vitals:    09/20/22 1152 09/20/22 1153   BP: (!) 200/72 (!) 187/80   Pulse: 76 78   Resp: 18    Temp: 98.5 °F (36.9 °C)    TempSrc: Skin    PainSc: 0-No pain       Shoe Size:     Past Surgical History:   Procedure Laterality Date    AV FISTULA PLACEMENT Left 8/29/2022    Procedure: CREATION, AV FISTULA;  Surgeon: Ali Khoobehi, MD;  Location: [facility] [place] OR;  Service: Vascular;  Laterality: Left;    BONE BIOPSY Left 8/24/2022    Procedure: Biopsy-Bone;  Surgeon: Porfirio Ponce DPM;  Location: [facility] [place] OR;  Service: Podiatry;  Laterality: Left;    COLONOSCOPY N/A 10/5/2016    Procedure: COLONOSCOPY;  Surgeon: Pillo Chanel MD;  Location: [facility] ENDO;  Service: Endoscopy;  Laterality: N/A;    COLONOSCOPY N/A 4/26/2022    Procedure: COLONOSCOPY;  Surgeon: Saravanan Rachel MD;  Location: [facility] ENDO;  Service: Endoscopy;  Laterality: N/A;    FISTULOGRAM Left 8/24/2022    Procedure: Fistulogram;  Surgeon: Ali Khoobehi, MD;  Location: [facility] [place] CATH/EP LAB;  Service: Vascular;  Laterality: Left;    HERNIA REPAIR Bilateral 11/22/2016    inguinal    HYSTERECTOMY      INCISION AND DRAINAGE FOOT Left 5/13/2022    Procedure: INCISION AND DRAINAGE, FOOT;  Surgeon: Ricardo Bruno DPM;  Location: [facility] [place] OR;  Service: Podiatry;  Laterality: Left;    OOPHORECTOMY      THROMBECTOMY, AV FISTULA, UPPER EXTREMITY, PERCUTANEOUS  8/24/2022    Procedure: THROMBECTOMY, AV FISTULA, UPPER EXTREMITY, PERCUTANEOUS;  Surgeon: Ali Khoobehi, MD;  Location: [facility] [place] CATH/EP LAB;  Service: Vascular;;     TOE AMPUTATION Left 8/26/2022    Procedure: AMPUTATION, TOE;  Surgeon: Porfirio Ponce DPM;  Location: [facility];  Service: Podiatry;  Laterality: Left;     Past Medical History:   Diagnosis Date    A-fib     resolved per patient    Anemia due to end stage renal disease 6/30/2022    Arthritis     Bronchitis     Cardiovascular event risk, ASCVD 10-year risk 6.7% 10/15/2016    Diabetes mellitus type II     Diabetic foot infection     Diabetic ulcer of left midfoot 05/05/2022    Disorder of kidney and ureter     Encounter for blood transfusion     ESRD (end stage renal disease) 05/18/2018    MANJINDER (generalized anxiety disorder) 10/25/2016    History of colon polyps 11/02/2016    Hyperlipidemia     Hypertension     Stroke      Family History   Problem Relation Age of Onset    Hyperlipidemia Mother     Hypertension Mother     Hypertension Father     Diabetes Father     Diabetes Sister     Diabetes Sister     Diabetes Maternal Aunt     Diabetes Maternal Grandmother     Heart disease Maternal Grandmother     Cancer Paternal Grandmother     Breast cancer Neg Hx     Colon cancer Neg Hx     Ovarian cancer Neg Hx         Social History:   Marital Status:   Alcohol History:  reports no history of alcohol use.  Tobacco History:  reports that she has never smoked. She has never used smokeless tobacco.  Drug History:  reports no history of drug use.    Review of patient's allergies indicates:   Allergen Reactions    Dilaudid [hydromorphone] Nausea And Vomiting    Chlorhexidine Itching    Lortab [hydrocodone-acetaminophen] Itching       Current Outpatient Medications   Medication Sig Dispense Refill    amLODIPine (NORVASC) 10 MG tablet Take 1 tablet (10 mg total) by mouth once daily.      aspirin (ECOTRIN) 81 MG EC tablet Take 81 mg by mouth once daily.      atorvastatin (LIPITOR) 80 MG tablet Take 1 tablet (80 mg total) by mouth once daily. 90 tablet 3    azelastine (ASTELIN) 137 mcg (0.1 %) nasal spray 1 spray (137 mcg  total) by Nasal route 2 (two) times a day. 30 mL 2    blood sugar diagnostic Strp To check BG 4 times daily, to use with insurance preferred meter (Patient taking differently: 1 strip by Misc.(Non-Drug; Combo Route) route 4 (four) times daily. To check BG 4 times daily, to use with insurance preferred meter) 450 strip 3    calcium acetate,phosphat bind, (PHOSLO) 667 mg capsule Take 1 capsule (667 mg total) by mouth 3 (three) times daily with meals.      cetirizine (ZYRTEC) 10 MG tablet Take 1 tablet (10 mg total) by mouth every other day. 45 tablet 3    clopidogreL (PLAVIX) 75 mg tablet Take 1 tablet (75 mg total) by mouth once daily. 90 tablet 3    epoetin chris-epbx (RETACRIT) 4,000 unit/mL injection Inject 1.11 mLs (4,440 Units total) into the skin every Mon, Wed, Fri.      fluticasone propionate (FLONASE) 50 mcg/actuation nasal spray 2 sprays (100 mcg total) by Each Nostril route once daily. 16 g 3    gabapentin (NEURONTIN) 100 MG capsule Take 1 capsule (100 mg total) by mouth 2 (two) times daily. 180 capsule 3    hydrALAZINE (APRESOLINE) 50 MG tablet Take 1 tablet (50 mg total) by mouth every 12 (twelve) hours. 180 tablet 3    insulin aspart U-100 (NOVOLOG FLEXPEN U-100 INSULIN) 100 unit/mL (3 mL) InPn pen Inject 5 Units into the skin 3 (three) times daily with meals. 3 mL 6    insulin detemir U-100 (LEVEMIR FLEXTOUCH U-100 INSULN) 100 unit/mL (3 mL) InPn pen Inject 15 Units into the skin 2 (two) times daily. 27 mL 3    lactulose (CHRONULAC) 10 gram/15 mL solution Take 20 g by mouth 2 (two) times a day.      losartan (COZAAR) 100 MG tablet Take 1 tablet (100 mg total) by mouth once daily.      ondansetron (ZOFRAN-ODT) 8 MG TbDL Take 1 tablet (8 mg total) by mouth every 8 (eight) hours as needed (nausea).      oxybutynin (DITROPAN) 5 MG Tab Take 1 tablet by mouth.      oxyCODONE (ROXICODONE) 5 MG immediate release tablet Take 1 tablet (5 mg total) by mouth every 6 (six) hours as needed for Pain. (Patient not  "taking: Reported on 9/15/2022)      pen needle, diabetic (BD ULTRA-FINE ROBERT PEN NEEDLE) 32 gauge x 5/32" Ndle To use 4 times per day with insulin injections. (Patient taking differently: 1 pen by Misc.(Non-Drug; Combo Route) route 4 (four) times daily. To use 4 times per day with insulin injections.) 450 each 2    polyethylene glycol (GLYCOLAX) 17 gram PwPk Take 17 g by mouth 2 (two) times daily as needed.      senna-docusate 8.6-50 mg (PERICOLACE) 8.6-50 mg per tablet Take 1 tablet by mouth 2 (two) times daily.       No current facility-administered medications for this visit.       Review of Systems   Constitutional:  Negative for chills, fatigue, fever and unexpected weight change.   HENT:  Negative for hearing loss and trouble swallowing.    Eyes:  Negative for photophobia and visual disturbance.   Respiratory:  Negative for cough, shortness of breath and wheezing.    Cardiovascular:  Negative for chest pain, palpitations and leg swelling.   Gastrointestinal:  Negative for abdominal pain and nausea.   Genitourinary:  Negative for dysuria and frequency.   Musculoskeletal:  Negative for arthralgias, back pain, gait problem, joint swelling and myalgias.   Skin:  Positive for wound. Negative for rash.   Neurological:  Negative for tremors, seizures, weakness, numbness and headaches.   Hematological:  Does not bruise/bleed easily.       Objective:        Physical Exam:   Foot Exam  Physical Exam  Ortho/SPM Exam     Imaging:            Assessment:       1. Diabetic ulcer of left midfoot associated with type 2 diabetes mellitus, with necrosis of muscle    2. Ulcer of left foot with necrosis of muscle    3. Ulcer of right foot, unspecified ulcer stage    4. Ulcer of toe of left foot, unspecified ulcer stage    5. Type 2 diabetes mellitus with hypoglycemia and coma, with long-term current use of insulin    6. Type 2 diabetes mellitus with chronic kidney disease on chronic dialysis, with long-term current use of insulin  "   7. At high risk for inadequate nutritional intake    8. Difficulty in walking, not elsewhere classified    9. 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    10. History of TIA (transient ischemic attack)    11. ESRD (end stage renal disease)    12. History of amputation of left foot    13. Decreased pedal pulses    14. Peripheral vascular disease    15. Diabetic foot infection    16. At risk for readmission to hospital    17. Eschar of heel    18. Heel ulcer, left, with necrosis of muscle    19. Diabetic ulcer of other part of right foot associated with diabetes mellitus due to underlying condition, unspecified ulcer stage    20. Bilateral lower extremity edema    21. Cellulitis and abscess of foot      Plan:   Diabetic ulcer of left midfoot associated with type 2 diabetes mellitus, with necrosis of muscle    Ulcer of left foot with necrosis of muscle    Ulcer of right foot, unspecified ulcer stage    Ulcer of toe of left foot, unspecified ulcer stage    Type 2 diabetes mellitus with hypoglycemia and coma, with long-term current use of insulin    Type 2 diabetes mellitus with chronic kidney disease on chronic dialysis, with long-term current use of insulin    At high risk for inadequate nutritional intake    Difficulty in walking, not elsewhere classified    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    History of TIA (transient ischemic attack)    ESRD (end stage renal disease)    History of amputation of left foot    Decreased pedal pulses    Peripheral vascular disease    Diabetic foot infection    At risk for readmission to hospital    Eschar of heel    Heel ulcer, left, with necrosis of muscle    Diabetic ulcer of other part of right foot associated with diabetes mellitus due to underlying condition, unspecified ulcer stage    Bilateral lower extremity edema    Cellulitis and abscess of foot    Follow up in about 1 week (around 9/27/2022).    Procedures          Counseling:     I  provided patient education verbally regarding:   Patient diagnosis, treatment options, as well as alternatives, risks, and benefits.     This note was created using Dragon voice recognition software that occasionally misinterpreted phrases or words.

## 2022-09-22 ENCOUNTER — TELEPHONE (OUTPATIENT)
Dept: FAMILY MEDICINE | Facility: CLINIC | Age: 57
End: 2022-09-22
Payer: MEDICAID

## 2022-09-23 DIAGNOSIS — Z76.82 AWAITING ORGAN TRANSPLANT STATUS: Primary | ICD-10-CM

## 2022-09-26 LAB
FUNGUS SPEC CULT: NORMAL

## 2022-09-27 ENCOUNTER — OFFICE VISIT (OUTPATIENT)
Dept: WOUND CARE | Facility: HOSPITAL | Age: 57
End: 2022-09-27
Attending: PODIATRIST
Payer: MEDICARE

## 2022-09-27 ENCOUNTER — DOCUMENT SCAN (OUTPATIENT)
Dept: HOME HEALTH SERVICES | Facility: HOSPITAL | Age: 57
End: 2022-09-27
Payer: MEDICAID

## 2022-09-27 VITALS
RESPIRATION RATE: 18 BRPM | HEART RATE: 81 BPM | BODY MASS INDEX: 25.92 KG/M2 | HEIGHT: 69 IN | WEIGHT: 175 LBS | TEMPERATURE: 98 F

## 2022-09-27 DIAGNOSIS — E11.22 TYPE 2 DIABETES MELLITUS WITH CHRONIC KIDNEY DISEASE ON CHRONIC DIALYSIS, WITH LONG-TERM CURRENT USE OF INSULIN: ICD-10-CM

## 2022-09-27 DIAGNOSIS — L97.423 HEEL ULCER, LEFT, WITH NECROSIS OF MUSCLE: ICD-10-CM

## 2022-09-27 DIAGNOSIS — L08.9 DIABETIC FOOT INFECTION: ICD-10-CM

## 2022-09-27 DIAGNOSIS — E11.628 DIABETIC FOOT INFECTION: ICD-10-CM

## 2022-09-27 DIAGNOSIS — E11.621 DIABETIC ULCER OF LEFT MIDFOOT ASSOCIATED WITH TYPE 2 DIABETES MELLITUS, WITH NECROSIS OF MUSCLE: ICD-10-CM

## 2022-09-27 DIAGNOSIS — L97.523 ULCER OF LEFT FOOT WITH NECROSIS OF MUSCLE: Primary | ICD-10-CM

## 2022-09-27 DIAGNOSIS — Z89.432 HISTORY OF AMPUTATION OF LEFT FOOT: ICD-10-CM

## 2022-09-27 DIAGNOSIS — R60.0 BILATERAL LOWER EXTREMITY EDEMA: ICD-10-CM

## 2022-09-27 DIAGNOSIS — Z86.73 HISTORY OF TIA (TRANSIENT ISCHEMIC ATTACK): ICD-10-CM

## 2022-09-27 DIAGNOSIS — Z91.89 AT HIGH RISK FOR INADEQUATE NUTRITIONAL INTAKE: ICD-10-CM

## 2022-09-27 DIAGNOSIS — E08.621 DIABETIC ULCER OF OTHER PART OF RIGHT FOOT ASSOCIATED WITH DIABETES MELLITUS DUE TO UNDERLYING CONDITION, UNSPECIFIED ULCER STAGE: ICD-10-CM

## 2022-09-27 DIAGNOSIS — Z91.89 AT RISK FOR READMISSION TO HOSPITAL: ICD-10-CM

## 2022-09-27 DIAGNOSIS — R26.2 DIFFICULTY IN WALKING, NOT ELSEWHERE CLASSIFIED: ICD-10-CM

## 2022-09-27 DIAGNOSIS — L02.619 CELLULITIS AND ABSCESS OF FOOT: ICD-10-CM

## 2022-09-27 DIAGNOSIS — Z99.2 TYPE 2 DIABETES MELLITUS WITH CHRONIC KIDNEY DISEASE ON CHRONIC DIALYSIS, WITH LONG-TERM CURRENT USE OF INSULIN: ICD-10-CM

## 2022-09-27 DIAGNOSIS — L03.119 CELLULITIS AND ABSCESS OF FOOT: ICD-10-CM

## 2022-09-27 DIAGNOSIS — L97.423 DIABETIC ULCER OF LEFT MIDFOOT ASSOCIATED WITH TYPE 2 DIABETES MELLITUS, WITH NECROSIS OF MUSCLE: ICD-10-CM

## 2022-09-27 DIAGNOSIS — N18.6 ESRD (END STAGE RENAL DISEASE): ICD-10-CM

## 2022-09-27 DIAGNOSIS — L97.519 DIABETIC ULCER OF OTHER PART OF RIGHT FOOT ASSOCIATED WITH DIABETES MELLITUS DUE TO UNDERLYING CONDITION, UNSPECIFIED ULCER STAGE: ICD-10-CM

## 2022-09-27 DIAGNOSIS — Z79.4 TYPE 2 DIABETES MELLITUS WITH CHRONIC KIDNEY DISEASE ON CHRONIC DIALYSIS, WITH LONG-TERM CURRENT USE OF INSULIN: ICD-10-CM

## 2022-09-27 DIAGNOSIS — E11.641 TYPE 2 DIABETES MELLITUS WITH HYPOGLYCEMIA AND COMA, WITH LONG-TERM CURRENT USE OF INSULIN: ICD-10-CM

## 2022-09-27 DIAGNOSIS — N18.6 TYPE 2 DIABETES MELLITUS WITH CHRONIC KIDNEY DISEASE ON CHRONIC DIALYSIS, WITH LONG-TERM CURRENT USE OF INSULIN: ICD-10-CM

## 2022-09-27 DIAGNOSIS — I73.9 PERIPHERAL VASCULAR DISEASE: ICD-10-CM

## 2022-09-27 DIAGNOSIS — R09.89 DECREASED PEDAL PULSES: ICD-10-CM

## 2022-09-27 DIAGNOSIS — L97.529 ULCER OF TOE OF LEFT FOOT, UNSPECIFIED ULCER STAGE: ICD-10-CM

## 2022-09-27 DIAGNOSIS — Z79.4 TYPE 2 DIABETES MELLITUS WITH HYPOGLYCEMIA AND COMA, WITH LONG-TERM CURRENT USE OF INSULIN: ICD-10-CM

## 2022-09-27 DIAGNOSIS — L97.519 ULCER OF RIGHT FOOT, UNSPECIFIED ULCER STAGE: ICD-10-CM

## 2022-09-27 DIAGNOSIS — E11.649 TYPE 2 DIABETES MELLITUS WITH HYPOGLYCEMIA UNAWARENESS: ICD-10-CM

## 2022-09-27 PROCEDURE — 99214 OFFICE O/P EST MOD 30 MIN: CPT | Mod: 24,25,, | Performed by: PODIATRIST

## 2022-09-27 PROCEDURE — 3008F PR BODY MASS INDEX (BMI) DOCUMENTED: ICD-10-PCS | Mod: CPTII,,, | Performed by: PODIATRIST

## 2022-09-27 PROCEDURE — 11043 DBRDMT MUSC&/FSCA 1ST 20/<: CPT | Performed by: PODIATRIST

## 2022-09-27 PROCEDURE — 3008F BODY MASS INDEX DOCD: CPT | Mod: CPTII,,, | Performed by: PODIATRIST

## 2022-09-27 PROCEDURE — 1160F RVW MEDS BY RX/DR IN RCRD: CPT | Mod: CPTII,,, | Performed by: PODIATRIST

## 2022-09-27 PROCEDURE — 1160F PR REVIEW ALL MEDS BY PRESCRIBER/CLIN PHARMACIST DOCUMENTED: ICD-10-PCS | Mod: CPTII,,, | Performed by: PODIATRIST

## 2022-09-27 PROCEDURE — 11043 DBRDMT MUSC&/FSCA 1ST 20/<: CPT | Mod: 79,,, | Performed by: PODIATRIST

## 2022-09-27 PROCEDURE — 1111F PR DISCHARGE MEDS RECONCILED W/ CURRENT OUTPATIENT MED LIST: ICD-10-PCS | Mod: CPTII,,, | Performed by: PODIATRIST

## 2022-09-27 PROCEDURE — 3051F PR MOST RECENT HEMOGLOBIN A1C LEVEL 7.0 - < 8.0%: ICD-10-PCS | Mod: CPTII,,, | Performed by: PODIATRIST

## 2022-09-27 PROCEDURE — 1159F MED LIST DOCD IN RCRD: CPT | Mod: CPTII,,, | Performed by: PODIATRIST

## 2022-09-27 PROCEDURE — 3066F NEPHROPATHY DOC TX: CPT | Mod: CPTII,,, | Performed by: PODIATRIST

## 2022-09-27 PROCEDURE — 11043 PR DEBRIDEMENT, SKIN, SUB-Q TISSUE,MUSCLE,=<20 SQ CM: ICD-10-PCS | Mod: 79,,, | Performed by: PODIATRIST

## 2022-09-27 PROCEDURE — 4010F ACE/ARB THERAPY RXD/TAKEN: CPT | Mod: CPTII,,, | Performed by: PODIATRIST

## 2022-09-27 PROCEDURE — 99214 PR OFFICE/OUTPT VISIT, EST, LEVL IV, 30-39 MIN: ICD-10-PCS | Mod: 24,25,, | Performed by: PODIATRIST

## 2022-09-27 PROCEDURE — 1159F PR MEDICATION LIST DOCUMENTED IN MEDICAL RECORD: ICD-10-PCS | Mod: CPTII,,, | Performed by: PODIATRIST

## 2022-09-27 PROCEDURE — 11046 DBRDMT MUSC&/FSCA EA ADDL: CPT | Performed by: PODIATRIST

## 2022-09-27 PROCEDURE — 3066F PR DOCUMENTATION OF TREATMENT FOR NEPHROPATHY: ICD-10-PCS | Mod: CPTII,,, | Performed by: PODIATRIST

## 2022-09-27 PROCEDURE — 3051F HG A1C>EQUAL 7.0%<8.0%: CPT | Mod: CPTII,,, | Performed by: PODIATRIST

## 2022-09-27 PROCEDURE — 4010F PR ACE/ARB THEARPY RXD/TAKEN: ICD-10-PCS | Mod: CPTII,,, | Performed by: PODIATRIST

## 2022-09-27 PROCEDURE — 1111F DSCHRG MED/CURRENT MED MERGE: CPT | Mod: CPTII,,, | Performed by: PODIATRIST

## 2022-09-27 NOTE — PROGRESS NOTES
[address]  [place] [address]  Phone: [phone]   [phone]    Patient's PCP:Stefano Lopez MD  Referring Provider: Aaareferral Self    Subjective:      Chief Complaint:: Wound Care, Non-healing Wound, Pressure Ulcer, Wound Check, Diabetes, and Diabetic Foot Ulcer    HPI      Patient presents for follow-up of right and left diabetic foot ulcers.   Multiple ulcers present on both right and left foot.  See Wound docs for full assessments and evaluations of all wounds        Leanna García is a 57 y.o. female who presents today with a complaint of right foot ulcer, left midfoot ulcer, left heel ulcer, and left post amputation site of great toe.         1. Debridement of left midfoot ulcer.   Evaluation and assessment only of all remaining ulcers on both right and left foot. See Wound Docs for assessment of wounds and procedure notes  2. Continue taking all medications as prescribed.  3. Continue home health dressing changes, wound vac to left plantar foot wound.  Betadine followed by bulky wrap compression to heel wound.  Betadine with border foam to right foot wound.   4. RTC one week   5. Counseled patient on increasing protein intake, not getting wound wet, keeping dressing clean dry and intact, following a healthy diet, elevating legs when able, removing pressure from wound     Total time spent for E&M 35  Total time for debridement 35 minutes            Patient Name: Leanna García  MRN: [id number]  Patient Class: IP- Inpatient  Admission Date: 8/18/2022  Hospital Length of Stay: 10 days  Discharge Date and Time: 8/30/2022  3:20 PM  Attending Physician: No att. providers found   Discharging Provider: Andrea Montoya MD  Primary Care Provider: Stefano Lopez MD     57-year-old  female with multiple medical comorbidities including but not limited to type 2 diabetes mellitus complicated by diabetic foot ulcer, ESRD on hemodialysis, hyperlipidemia presented to the ED  with nausea and vomiting as well as redness involving the right foot wound.     Diagnosed with diabetic gastroparesis and symptoms resolved with metoclopramide.  She was recently discharged after prolonged LTAC stay for IV antibiotics of the left diabetic foot ulcer.  She was evaluated by Podiatry.  She underwent bedside excision debridement of the left midfoot on 08/19.  MRI revealed acute osteomyelitis of the left great toe involving proximal and distal phalanges.  This was followed by bone biopsy of the left hallux and excisional debridement with cultures growing Proteus mirabilis.  Eventually patient underwent amputation of the 1st toe and partial 1st metatarsal head amputation 8/26.  Seen by Infectious Disease.  Initially on broad-spectrum antibiotics and eventually de-escalated to intravenous cefepime which she will continue for 2 more weeks.     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  Fistulogram revealed large thrombus with aneurysms.  Underwent fistula repair in the OR.     Stable for discharge to home with resumption of home health.  Wound care orders sent.  Patient aware to follow-up with PCP, Podiatry and ID.  Discharge instructions and return precautions reviewed.     I have seen the patient on the day of discharge and reviewed the discharge instructions as outlined below. Patient verbalized understanding and is aware to contact primary care physician or return to ED if new or worsening symptoms.     I counseled the patient on their conditions, their implications and medical management.     >50% of this > 60 minute visit was spent face to face educating/counseling the patient     I spent a total of 60 minutes on the day of the visit.This includes face to face time and non-face to face time preparing to see the patient (eg, review of tests), obtaining and/or reviewing separately obtained history, documenting clinical information in the  electronic or other health record, independently interpreting results and communicating results to the patient/family/caregiver, or care coordinator.        Counseling/Education:  I provided patient education verbally regarding:   The aspects of diabetes and how it pertains to the feet. I explained the importance of proper diabetic foot care and how it is essential for the health of their feet.     I discussed the importance of knowing their Hemoglobin A1c and that the level needs to be as close to 6 as possible. I discussed the increase complications of high blood sugar including stroke, blindness, heart attack, kidney failure and loss of limb secondary to neuropathy and PVD.      With neuropathy, beware of any breaks in the skin or redness. These areas are not recognized early due to the numbness.     I discussed Diabetes, lower back issues, metabolic disorders, systemic causes, chemotherapy, vitamin deficiency, heavy metal exposure, as some of the causes. I also explained that as much as 40% of the time we can not find a cause. I discussed different treatments available to control the symptoms but which may not cure the problem.         Counseled patient on the aspects of diabetes and how it pertains to the feet.  I explained the importance of proper diabetic foot care and how it is essential for the health of their feet.        Shoe inspection. Patient instructed on proper foot hygeine. We discussed wearing proper shoe gear, daily foot inspections, never walking without protective shoe gear, never putting sharp instruments to feet, routine podiatric nail visits every 2-3 months.             Patient should call the office immediately if any signs of infection, such as fever, chills, sweats, increased redness or pain.     Patient was instructed to call the clinic or go to the emergency department if their symptoms do not improve, worsens, or if new symptoms develop.  Patient was advised that if any increased  "swelling, pain, or numbness arise to go immediately to the ED. Patient knows to call any time if an emergency arises. Shared decision making occurred and patient verbalized understanding in agreement with this plan.       Much of the documentation for this visit was completed in the Wound Docs system.  Please see the attached documentation for further details about the patient's care. Scanned under the Media tab.         Alivia Bruno DPM     Vitals:    09/27/22 0747   Pulse: 81   Resp: 18   Temp: 98.2 °F (36.8 °C)   Weight: 79.4 kg (175 lb)   Height: 5' 9" (1.753 m)   PainSc: 0-No pain      Shoe Size:     Past Surgical History:   Procedure Laterality Date    AV FISTULA PLACEMENT Left 8/29/2022    Procedure: CREATION, AV FISTULA;  Surgeon: Ali Khoobehi, MD;  Location: [facility] OR;  Service: Vascular;  Laterality: Left;    BONE BIOPSY Left 8/24/2022    Procedure: Biopsy-Bone;  Surgeon: Porfirio Ponce DPM;  Location: [facility] OR;  Service: Podiatry;  Laterality: Left;    COLONOSCOPY N/A 10/5/2016    Procedure: COLONOSCOPY;  Surgeon: Pillo Chanel MD;  Location: [facility] ENDO;  Service: Endoscopy;  Laterality: N/A;    COLONOSCOPY N/A 4/26/2022    Procedure: COLONOSCOPY;  Surgeon: Saravanan Rachel MD;  Location: [facility] ENDO;  Service: Endoscopy;  Laterality: N/A;    FISTULOGRAM Left 8/24/2022    Procedure: Fistulogram;  Surgeon: Ali Khoobehi, MD;  Location: [facility] CATH/EP LAB;  Service: Vascular;  Laterality: Left;    HERNIA REPAIR Bilateral 11/22/2016    inguinal    HYSTERECTOMY      INCISION AND DRAINAGE FOOT Left 5/13/2022    Procedure: INCISION AND DRAINAGE, FOOT;  Surgeon: Ricardo Bruno DPM;  Location: [facility] OR;  Service: Podiatry;  Laterality: Left;    OOPHORECTOMY      THROMBECTOMY, AV FISTULA, UPPER EXTREMITY, PERCUTANEOUS  8/24/2022    Procedure: THROMBECTOMY, AV FISTULA, UPPER EXTREMITY, PERCUTANEOUS;  Surgeon: Ali Khoobehi, MD;  Location: [facility] CATH/EP LAB;  Service: Vascular;;    TOE AMPUTATION Left 8/26/2022    " Procedure: AMPUTATION, TOE;  Surgeon: Porfirio Ponce DPM;  Location: [facility];  Service: Podiatry;  Laterality: Left;     Past Medical History:   Diagnosis Date    A-fib     resolved per patient    Anemia due to end stage renal disease 6/30/2022    Arthritis     Bronchitis     Cardiovascular event risk, ASCVD 10-year risk 6.7% 10/15/2016    Diabetes mellitus type II     Diabetic foot infection     Diabetic ulcer of left midfoot 05/05/2022    Disorder of kidney and ureter     Encounter for blood transfusion     ESRD (end stage renal disease) 05/18/2018    MANJINDER (generalized anxiety disorder) 10/25/2016    History of colon polyps 11/02/2016    Hyperlipidemia     Hypertension     Stroke      Family History   Problem Relation Age of Onset    Hyperlipidemia Mother     Hypertension Mother     Hypertension Father     Diabetes Father     Diabetes Sister     Diabetes Sister     Diabetes Maternal Aunt     Diabetes Maternal Grandmother     Heart disease Maternal Grandmother     Cancer Paternal Grandmother     Breast cancer Neg Hx     Colon cancer Neg Hx     Ovarian cancer Neg Hx         Social History:   Marital Status:   Alcohol History:  reports no history of alcohol use.  Tobacco History:  reports that she has never smoked. She has never used smokeless tobacco.  Drug History:  reports no history of drug use.    Review of patient's allergies indicates:   Allergen Reactions    Dilaudid [hydromorphone] Nausea And Vomiting    Chlorhexidine Itching    Lortab [hydrocodone-acetaminophen] Itching       Current Outpatient Medications   Medication Sig Dispense Refill    amLODIPine (NORVASC) 10 MG tablet Take 1 tablet (10 mg total) by mouth once daily.      aspirin (ECOTRIN) 81 MG EC tablet Take 81 mg by mouth once daily.      atorvastatin (LIPITOR) 80 MG tablet Take 1 tablet (80 mg total) by mouth once daily. 90 tablet 3    azelastine (ASTELIN) 137 mcg (0.1 %) nasal spray 1 spray (137 mcg total) by Nasal route 2 (two) times  a day. 30 mL 2    blood sugar diagnostic Strp To check BG 4 times daily, to use with insurance preferred meter (Patient taking differently: 1 strip by Misc.(Non-Drug; Combo Route) route 4 (four) times daily. To check BG 4 times daily, to use with insurance preferred meter) 450 strip 3    calcium acetate,phosphat bind, (PHOSLO) 667 mg capsule Take 1 capsule (667 mg total) by mouth 3 (three) times daily with meals.      cetirizine (ZYRTEC) 10 MG tablet Take 1 tablet (10 mg total) by mouth every other day. 45 tablet 3    clopidogreL (PLAVIX) 75 mg tablet Take 1 tablet (75 mg total) by mouth once daily. 90 tablet 3    epoetin chris-epbx (RETACRIT) 4,000 unit/mL injection Inject 1.11 mLs (4,440 Units total) into the skin every Mon, Wed, Fri.      fluticasone propionate (FLONASE) 50 mcg/actuation nasal spray 2 sprays (100 mcg total) by Each Nostril route once daily. 16 g 3    gabapentin (NEURONTIN) 100 MG capsule Take 1 capsule (100 mg total) by mouth 2 (two) times daily. 180 capsule 3    hydrALAZINE (APRESOLINE) 50 MG tablet Take 1 tablet (50 mg total) by mouth every 12 (twelve) hours. 180 tablet 3    insulin aspart U-100 (NOVOLOG FLEXPEN U-100 INSULIN) 100 unit/mL (3 mL) InPn pen Inject 5 Units into the skin 3 (three) times daily with meals. 3 mL 6    insulin detemir U-100 (LEVEMIR FLEXTOUCH U-100 INSULN) 100 unit/mL (3 mL) InPn pen Inject 15 Units into the skin 2 (two) times daily. 27 mL 3    lactulose (CHRONULAC) 10 gram/15 mL solution Take 20 g by mouth 2 (two) times a day.      losartan (COZAAR) 100 MG tablet Take 1 tablet (100 mg total) by mouth once daily.      ondansetron (ZOFRAN-ODT) 8 MG TbDL Take 1 tablet (8 mg total) by mouth every 8 (eight) hours as needed (nausea).      oxybutynin (DITROPAN) 5 MG Tab Take 1 tablet by mouth.      oxyCODONE (ROXICODONE) 5 MG immediate release tablet Take 1 tablet (5 mg total) by mouth every 6 (six) hours as needed for Pain. (Patient not taking: Reported on 9/15/2022)      pen  "needle, diabetic (BD ULTRA-FINE ROBERT PEN NEEDLE) 32 gauge x 5/32" Ndle To use 4 times per day with insulin injections. (Patient taking differently: 1 pen by Misc.(Non-Drug; Combo Route) route 4 (four) times daily. To use 4 times per day with insulin injections.) 450 each 2    polyethylene glycol (GLYCOLAX) 17 gram PwPk Take 17 g by mouth 2 (two) times daily as needed.      senna-docusate 8.6-50 mg (PERICOLACE) 8.6-50 mg per tablet Take 1 tablet by mouth 2 (two) times daily.       No current facility-administered medications for this visit.       Review of Systems   Constitutional:  Negative for chills, fatigue, fever and unexpected weight change.   HENT:  Negative for hearing loss and trouble swallowing.    Eyes:  Negative for photophobia and visual disturbance.   Respiratory:  Negative for cough, shortness of breath and wheezing.    Cardiovascular:  Negative for chest pain, palpitations and leg swelling.   Gastrointestinal:  Negative for abdominal pain and nausea.   Genitourinary:  Negative for dysuria and frequency.   Musculoskeletal:  Negative for arthralgias, back pain, gait problem, joint swelling and myalgias.   Skin:  Positive for wound. Negative for rash.   Neurological:  Negative for tremors, seizures, weakness, numbness and headaches.   Hematological:  Does not bruise/bleed easily.       Objective:        Physical Exam:   Foot Exam  Physical Exam  Ortho/SPM Exam     Imaging:            Assessment:       1. Ulcer of left foot with necrosis of muscle    2. Ulcer of right foot, unspecified ulcer stage    3. Ulcer of toe of left foot, unspecified ulcer stage    4. Diabetic ulcer of left midfoot associated with type 2 diabetes mellitus, with necrosis of muscle    5. Type 2 diabetes mellitus with chronic kidney disease on chronic dialysis, with long-term current use of insulin    6. At high risk for inadequate nutritional intake    7. Difficulty in walking, not elsewhere classified    8. Type 2 diabetes mellitus " with hypoglycemia and coma, with long-term current use of insulin    9. History of TIA (transient ischemic attack)    10. 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    11. Peripheral vascular disease    12. Diabetic foot infection    13. Decreased pedal pulses    14. History of amputation of left foot    15. ESRD (end stage renal disease)    16. At risk for readmission to hospital    17. Heel ulcer, left, with necrosis of muscle    18. Diabetic ulcer of other part of right foot associated with diabetes mellitus due to underlying condition, unspecified ulcer stage    19. Bilateral lower extremity edema    20. Cellulitis and abscess of foot      Plan:   Ulcer of left foot with necrosis of muscle    Ulcer of right foot, unspecified ulcer stage    Ulcer of toe of left foot, unspecified ulcer stage    Diabetic ulcer of left midfoot associated with type 2 diabetes mellitus, with necrosis of muscle    Type 2 diabetes mellitus with chronic kidney disease on chronic dialysis, with long-term current use of insulin    At high risk for inadequate nutritional intake    Difficulty in walking, not elsewhere classified    Type 2 diabetes mellitus with hypoglycemia and coma, with long-term current use of insulin    History of TIA (transient ischemic attack)    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    Peripheral vascular disease    Diabetic foot infection    Decreased pedal pulses    History of amputation of left foot    ESRD (end stage renal disease)    At risk for readmission to hospital    Heel ulcer, left, with necrosis of muscle    Diabetic ulcer of other part of right foot associated with diabetes mellitus due to underlying condition, unspecified ulcer stage    Bilateral lower extremity edema    Cellulitis and abscess of foot    Follow up in about 1 week (around 10/4/2022).    Procedures          Counseling:     I provided patient education verbally regarding:   Patient diagnosis, treatment options,  as well as alternatives, risks, and benefits.     This note was created using Dragon voice recognition software that occasionally misinterpreted phrases or words.

## 2022-09-28 ENCOUNTER — TELEPHONE (OUTPATIENT)
Dept: FAMILY MEDICINE | Facility: CLINIC | Age: 57
End: 2022-09-28
Payer: MEDICAID

## 2022-09-28 NOTE — TELEPHONE ENCOUNTER
----- Message from Natalie Ojeda sent at 9/28/2022 12:53 PM CDT -----  Regarding: orders  Contact: patient  Patient want to know if office can send orders for physical therapy to meet, please call back at [phone] (home)

## 2022-09-29 ENCOUNTER — DOCUMENT SCAN (OUTPATIENT)
Dept: HOME HEALTH SERVICES | Facility: HOSPITAL | Age: 57
End: 2022-09-29
Payer: MEDICAID

## 2022-09-29 ENCOUNTER — OFFICE VISIT (OUTPATIENT)
Dept: INFECTIOUS DISEASES | Facility: CLINIC | Age: 57
End: 2022-09-29
Payer: MEDICARE

## 2022-09-29 VITALS
DIASTOLIC BLOOD PRESSURE: 80 MMHG | TEMPERATURE: 98 F | HEIGHT: 69 IN | BODY MASS INDEX: 25.84 KG/M2 | OXYGEN SATURATION: 97 % | SYSTOLIC BLOOD PRESSURE: 146 MMHG | HEART RATE: 78 BPM

## 2022-09-29 DIAGNOSIS — Z99.2 TYPE 2 DIABETES MELLITUS WITH CHRONIC KIDNEY DISEASE ON CHRONIC DIALYSIS, WITH LONG-TERM CURRENT USE OF INSULIN: ICD-10-CM

## 2022-09-29 DIAGNOSIS — M86.9 OSTEOMYELITIS OF GREAT TOE OF LEFT FOOT: Primary | ICD-10-CM

## 2022-09-29 DIAGNOSIS — N18.6 TYPE 2 DIABETES MELLITUS WITH CHRONIC KIDNEY DISEASE ON CHRONIC DIALYSIS, WITH LONG-TERM CURRENT USE OF INSULIN: ICD-10-CM

## 2022-09-29 DIAGNOSIS — N18.6 ESRD (END STAGE RENAL DISEASE) ON DIALYSIS: ICD-10-CM

## 2022-09-29 DIAGNOSIS — E11.22 TYPE 2 DIABETES MELLITUS WITH CHRONIC KIDNEY DISEASE ON CHRONIC DIALYSIS, WITH LONG-TERM CURRENT USE OF INSULIN: ICD-10-CM

## 2022-09-29 DIAGNOSIS — Z99.2 ESRD (END STAGE RENAL DISEASE) ON DIALYSIS: ICD-10-CM

## 2022-09-29 DIAGNOSIS — Z79.4 TYPE 2 DIABETES MELLITUS WITH CHRONIC KIDNEY DISEASE ON CHRONIC DIALYSIS, WITH LONG-TERM CURRENT USE OF INSULIN: ICD-10-CM

## 2022-09-29 PROCEDURE — 3079F DIAST BP 80-89 MM HG: CPT | Mod: CPTII,S$GLB,, | Performed by: INTERNAL MEDICINE

## 2022-09-29 PROCEDURE — 1159F PR MEDICATION LIST DOCUMENTED IN MEDICAL RECORD: ICD-10-PCS | Mod: CPTII,S$GLB,, | Performed by: INTERNAL MEDICINE

## 2022-09-29 PROCEDURE — 99213 PR OFFICE/OUTPT VISIT, EST, LEVL III, 20-29 MIN: ICD-10-PCS | Mod: S$GLB,,, | Performed by: INTERNAL MEDICINE

## 2022-09-29 PROCEDURE — 99213 OFFICE O/P EST LOW 20 MIN: CPT | Mod: S$GLB,,, | Performed by: INTERNAL MEDICINE

## 2022-09-29 PROCEDURE — 3079F PR MOST RECENT DIASTOLIC BLOOD PRESSURE 80-89 MM HG: ICD-10-PCS | Mod: CPTII,S$GLB,, | Performed by: INTERNAL MEDICINE

## 2022-09-29 PROCEDURE — 3008F PR BODY MASS INDEX (BMI) DOCUMENTED: ICD-10-PCS | Mod: CPTII,S$GLB,, | Performed by: INTERNAL MEDICINE

## 2022-09-29 PROCEDURE — 1159F MED LIST DOCD IN RCRD: CPT | Mod: CPTII,S$GLB,, | Performed by: INTERNAL MEDICINE

## 2022-09-29 PROCEDURE — 3077F PR MOST RECENT SYSTOLIC BLOOD PRESSURE >= 140 MM HG: ICD-10-PCS | Mod: CPTII,S$GLB,, | Performed by: INTERNAL MEDICINE

## 2022-09-29 PROCEDURE — 3008F BODY MASS INDEX DOCD: CPT | Mod: CPTII,S$GLB,, | Performed by: INTERNAL MEDICINE

## 2022-09-29 PROCEDURE — 3051F PR MOST RECENT HEMOGLOBIN A1C LEVEL 7.0 - < 8.0%: ICD-10-PCS | Mod: CPTII,S$GLB,, | Performed by: INTERNAL MEDICINE

## 2022-09-29 PROCEDURE — 3066F PR DOCUMENTATION OF TREATMENT FOR NEPHROPATHY: ICD-10-PCS | Mod: CPTII,S$GLB,, | Performed by: INTERNAL MEDICINE

## 2022-09-29 PROCEDURE — 1111F DSCHRG MED/CURRENT MED MERGE: CPT | Mod: CPTII,S$GLB,, | Performed by: INTERNAL MEDICINE

## 2022-09-29 PROCEDURE — 1111F PR DISCHARGE MEDS RECONCILED W/ CURRENT OUTPATIENT MED LIST: ICD-10-PCS | Mod: CPTII,S$GLB,, | Performed by: INTERNAL MEDICINE

## 2022-09-29 PROCEDURE — 4010F PR ACE/ARB THEARPY RXD/TAKEN: ICD-10-PCS | Mod: CPTII,S$GLB,, | Performed by: INTERNAL MEDICINE

## 2022-09-29 PROCEDURE — 3066F NEPHROPATHY DOC TX: CPT | Mod: CPTII,S$GLB,, | Performed by: INTERNAL MEDICINE

## 2022-09-29 PROCEDURE — 3051F HG A1C>EQUAL 7.0%<8.0%: CPT | Mod: CPTII,S$GLB,, | Performed by: INTERNAL MEDICINE

## 2022-09-29 PROCEDURE — 4010F ACE/ARB THERAPY RXD/TAKEN: CPT | Mod: CPTII,S$GLB,, | Performed by: INTERNAL MEDICINE

## 2022-09-29 PROCEDURE — 3077F SYST BP >= 140 MM HG: CPT | Mod: CPTII,S$GLB,, | Performed by: INTERNAL MEDICINE

## 2022-09-29 NOTE — PROGRESS NOTES
Subjective:       Patient ID: Leanna García is a 57 y.o. female.    Chief Complaint:: hospital follow up visit     HPI seen numerous times at [facility] for bacteremia(Bacillus cereus) and complicated wound infections and osteomyelitis of the left foot, s/p more than 1 LTAC stay, multiple debridements and ultimately amputation of the left great toe. She was given 2 weeks of cefepime post surgery on 8/26 and the stitches were removed this week. The wound has healed. She is still receiving treatment for right heel eschar and left midfoot wound. On initial presentation in march her A1c was > 14% and after prolonged confinement, It improved to 7%.    Latest Reference Range & Units 03/10/22 06:50 05/05/22 03:20 05/29/22 13:24 06/22/22 06:18   Hemoglobin A1C External 4.5 - 6.2 % >14.0 (H) 10.6 (H) 8.3 (H) 7.5 (H)       Unfortunately, her BS are up in the 200's again , in the evening. She and her  are doing the best they can, but really do not understand what a diabetic diet is supposed to be.   She ambulates on the foot a little more than she was advised, but did come in a wheelchair.     Review of patient's allergies indicates:   Allergen Reactions    Dilaudid [hydromorphone] Nausea And Vomiting    Chlorhexidine Itching    Lortab [hydrocodone-acetaminophen] Itching     Past Medical History:   Diagnosis Date    A-fib     resolved per patient    Anemia due to end stage renal disease 6/30/2022    Arthritis     Bronchitis     Cardiovascular event risk, ASCVD 10-year risk 6.7% 10/15/2016    Diabetes mellitus type II     Diabetic foot infection     Diabetic ulcer of left midfoot 05/05/2022    Disorder of kidney and ureter     Encounter for blood transfusion     ESRD (end stage renal disease) 05/18/2018    MANJINDER (generalized anxiety disorder) 10/25/2016    History of colon polyps 11/02/2016    Hyperlipidemia     Hypertension     Stroke      Past Surgical History:   Procedure Laterality Date    AV FISTULA PLACEMENT Left  8/29/2022    Procedure: CREATION, AV FISTULA;  Surgeon: Ali Khoobehi, MD;  Location: [facility] OR;  Service: Vascular;  Laterality: Left;    BONE BIOPSY Left 8/24/2022    Procedure: Biopsy-Bone;  Surgeon: Porfirio Ponce DPM;  Location: [facility] OR;  Service: Podiatry;  Laterality: Left;    COLONOSCOPY N/A 10/5/2016    Procedure: COLONOSCOPY;  Surgeon: Pillo Chanel MD;  Location: [facility] ENDO;  Service: Endoscopy;  Laterality: N/A;    COLONOSCOPY N/A 4/26/2022    Procedure: COLONOSCOPY;  Surgeon: Saravanan Rachel MD;  Location: [facility] ENDO;  Service: Endoscopy;  Laterality: N/A;    FISTULOGRAM Left 8/24/2022    Procedure: Fistulogram;  Surgeon: Ali Khoobehi, MD;  Location: [facility] CATH/EP LAB;  Service: Vascular;  Laterality: Left;    HERNIA REPAIR Bilateral 11/22/2016    inguinal    HYSTERECTOMY      INCISION AND DRAINAGE FOOT Left 5/13/2022    Procedure: INCISION AND DRAINAGE, FOOT;  Surgeon: Ricardo Bruno DPM;  Location: [facility] OR;  Service: Podiatry;  Laterality: Left;    OOPHORECTOMY      THROMBECTOMY, AV FISTULA, UPPER EXTREMITY, PERCUTANEOUS  8/24/2022    Procedure: THROMBECTOMY, AV FISTULA, UPPER EXTREMITY, PERCUTANEOUS;  Surgeon: Ali Khoobehi, MD;  Location: [facility] CATH/EP LAB;  Service: Vascular;;    TOE AMPUTATION Left 8/26/2022    Procedure: AMPUTATION, TOE;  Surgeon: Porfirio Ponce DPM;  Location: [facility] OR;  Service: Podiatry;  Laterality: Left;     Social History     Tobacco Use    Smoking status: Never    Smokeless tobacco: Never   Substance Use Topics    Alcohol use: No        Family History   Problem Relation Age of Onset    Hyperlipidemia Mother     Hypertension Mother     Hypertension Father     Diabetes Father     Diabetes Sister     Diabetes Sister     Diabetes Maternal Aunt     Diabetes Maternal Grandmother     Heart disease Maternal Grandmother     Cancer Paternal Grandmother     Breast cancer Neg Hx     Colon cancer Neg Hx     Ovarian cancer Neg Hx          Review of Systems    Constitutional: No  "fever, chills, sweats    Eyes: No change in vision,    ENT: No sore throat, mouth pains, or lesions  Cardiovascular: No chest pain or pedal edema  Respiratory: No shortness of breath, DUQUE,   Gastrointestinal: No abdominal pain, nausea, vomiting, diarrhea,   Genitourinary:   Musculoskeletal: No new pain, joint swelling, or injuries  Integumentary: No new rashes, skin lesions, or wounds  Neurological: No dizziness,  falls but does have neuropathy  Psychiatric: No anxiety, depression  Endocrine: Blood sugars are not well controlled  Lymphatic: No lymphadenopathy, blood loss, anemia, malignancy    VAD:     Objective:      Blood pressure (!) 146/80, pulse 78, temperature 98.4 °F (36.9 °C), height 5' 9" (1.753 m), SpO2 97 %. Body mass index is 25.84 kg/m².  Physical Exam      General: Alert and attentive, cooperative and in no distress  Eyes:  anicteric, EOMI  Neck: Supple  ENT: EAC patent,  nares patent, no oral lesions teeth in good condition, no thrush  Cardiovascular:   Respiratory:   Gastrointestinal:    Genitourinary:   Integumentary: Skin without rashes  Vascular: No peripheral edema or phlebitis, warm and well perfused  Musculoskeletal: in a wheelchair,  no acute arthritis, synovitis or myositis.    Lymphatic:    Neurological: Normal LOC, cranial nerves, speech,   Psychiatric: Normal mood, speech,  demeanor     Wound:wound care photos and records reviewed and I examined her left foot and the incision is intact with 1 tiny spot of scab     VAD:        Recent Diagnostics:          Assessment and Plan:           Osteomyelitis of great toe of left foot    Type 2 diabetes mellitus with chronic kidney disease on chronic dialysis, with long-term current use of insulin  -     SUBSEQUENT HOME HEALTH ORDERS    ESRD (end stage renal disease) on dialysis    No further antibiotics are needed    I am requesting that your home health company send a dietician to instruct both of you on a proper diabetic diet.     Keep track of " the blood sugars on the form I gave you and record what you are eating so that you can see how what you eat affects your blood sugar.     This note was created using Dragon voice recognition software that occasionally misinterpreted phrases or words.

## 2022-10-04 ENCOUNTER — OFFICE VISIT (OUTPATIENT)
Dept: WOUND CARE | Facility: HOSPITAL | Age: 57
End: 2022-10-04
Attending: PODIATRIST
Payer: MEDICARE

## 2022-10-04 VITALS
DIASTOLIC BLOOD PRESSURE: 100 MMHG | HEART RATE: 77 BPM | SYSTOLIC BLOOD PRESSURE: 144 MMHG | TEMPERATURE: 98 F | RESPIRATION RATE: 20 BRPM

## 2022-10-04 DIAGNOSIS — E08.621 DIABETIC ULCER OF OTHER PART OF RIGHT FOOT ASSOCIATED WITH DIABETES MELLITUS DUE TO UNDERLYING CONDITION, UNSPECIFIED ULCER STAGE: ICD-10-CM

## 2022-10-04 DIAGNOSIS — L97.519 ULCER OF RIGHT FOOT, UNSPECIFIED ULCER STAGE: ICD-10-CM

## 2022-10-04 DIAGNOSIS — L97.523 ULCER OF LEFT FOOT WITH NECROSIS OF MUSCLE: Primary | ICD-10-CM

## 2022-10-04 DIAGNOSIS — Z91.89 AT HIGH RISK FOR INADEQUATE NUTRITIONAL INTAKE: ICD-10-CM

## 2022-10-04 DIAGNOSIS — E11.52 DIABETIC WET GANGRENE OF THE FOOT: ICD-10-CM

## 2022-10-04 DIAGNOSIS — Z91.89 AT RISK FOR READMISSION TO HOSPITAL: ICD-10-CM

## 2022-10-04 DIAGNOSIS — L97.423 DIABETIC ULCER OF LEFT MIDFOOT ASSOCIATED WITH TYPE 2 DIABETES MELLITUS, WITH NECROSIS OF MUSCLE: ICD-10-CM

## 2022-10-04 DIAGNOSIS — R26.2 DIFFICULTY IN WALKING, NOT ELSEWHERE CLASSIFIED: ICD-10-CM

## 2022-10-04 DIAGNOSIS — L97.423 HEEL ULCER, LEFT, WITH NECROSIS OF MUSCLE: ICD-10-CM

## 2022-10-04 DIAGNOSIS — Z86.73 HISTORY OF TIA (TRANSIENT ISCHEMIC ATTACK): ICD-10-CM

## 2022-10-04 DIAGNOSIS — E11.628 DIABETIC FOOT INFECTION: ICD-10-CM

## 2022-10-04 DIAGNOSIS — R09.89 DECREASED PEDAL PULSES: ICD-10-CM

## 2022-10-04 DIAGNOSIS — L08.9 DIABETIC FOOT INFECTION: ICD-10-CM

## 2022-10-04 DIAGNOSIS — N18.6 TYPE 2 DIABETES MELLITUS WITH CHRONIC KIDNEY DISEASE ON CHRONIC DIALYSIS, WITH LONG-TERM CURRENT USE OF INSULIN: ICD-10-CM

## 2022-10-04 DIAGNOSIS — Z89.432 HISTORY OF AMPUTATION OF LEFT FOOT: ICD-10-CM

## 2022-10-04 DIAGNOSIS — E11.649 TYPE 2 DIABETES MELLITUS WITH HYPOGLYCEMIA UNAWARENESS: ICD-10-CM

## 2022-10-04 DIAGNOSIS — E11.22 TYPE 2 DIABETES MELLITUS WITH CHRONIC KIDNEY DISEASE ON CHRONIC DIALYSIS, WITH LONG-TERM CURRENT USE OF INSULIN: ICD-10-CM

## 2022-10-04 DIAGNOSIS — Z79.4 TYPE 2 DIABETES MELLITUS WITH CHRONIC KIDNEY DISEASE ON CHRONIC DIALYSIS, WITH LONG-TERM CURRENT USE OF INSULIN: ICD-10-CM

## 2022-10-04 DIAGNOSIS — L97.519 DIABETIC ULCER OF OTHER PART OF RIGHT FOOT ASSOCIATED WITH DIABETES MELLITUS DUE TO UNDERLYING CONDITION, UNSPECIFIED ULCER STAGE: ICD-10-CM

## 2022-10-04 DIAGNOSIS — I73.9 PERIPHERAL VASCULAR DISEASE: ICD-10-CM

## 2022-10-04 DIAGNOSIS — E11.621 DIABETIC ULCER OF LEFT MIDFOOT ASSOCIATED WITH TYPE 2 DIABETES MELLITUS, WITH NECROSIS OF MUSCLE: ICD-10-CM

## 2022-10-04 DIAGNOSIS — E11.641 TYPE 2 DIABETES MELLITUS WITH HYPOGLYCEMIA AND COMA, WITH LONG-TERM CURRENT USE OF INSULIN: ICD-10-CM

## 2022-10-04 DIAGNOSIS — L97.529 ULCER OF TOE OF LEFT FOOT, UNSPECIFIED ULCER STAGE: ICD-10-CM

## 2022-10-04 DIAGNOSIS — Z99.2 TYPE 2 DIABETES MELLITUS WITH CHRONIC KIDNEY DISEASE ON CHRONIC DIALYSIS, WITH LONG-TERM CURRENT USE OF INSULIN: ICD-10-CM

## 2022-10-04 DIAGNOSIS — N18.6 ESRD (END STAGE RENAL DISEASE): ICD-10-CM

## 2022-10-04 DIAGNOSIS — Z79.4 TYPE 2 DIABETES MELLITUS WITH HYPOGLYCEMIA AND COMA, WITH LONG-TERM CURRENT USE OF INSULIN: ICD-10-CM

## 2022-10-04 DIAGNOSIS — R23.4 ESCHAR OF HEEL: ICD-10-CM

## 2022-10-04 PROCEDURE — 4010F PR ACE/ARB THEARPY RXD/TAKEN: ICD-10-PCS | Mod: CPTII,,, | Performed by: PODIATRIST

## 2022-10-04 PROCEDURE — 99214 OFFICE O/P EST MOD 30 MIN: CPT | Mod: 24,25,, | Performed by: PODIATRIST

## 2022-10-04 PROCEDURE — 11042 DBRDMT SUBQ TIS 1ST 20SQCM/<: CPT | Mod: 79,59,, | Performed by: PODIATRIST

## 2022-10-04 PROCEDURE — 1160F RVW MEDS BY RX/DR IN RCRD: CPT | Mod: CPTII,,, | Performed by: PODIATRIST

## 2022-10-04 PROCEDURE — 3080F DIAST BP >= 90 MM HG: CPT | Mod: CPTII,,, | Performed by: PODIATRIST

## 2022-10-04 PROCEDURE — 3080F PR MOST RECENT DIASTOLIC BLOOD PRESSURE >= 90 MM HG: ICD-10-PCS | Mod: CPTII,,, | Performed by: PODIATRIST

## 2022-10-04 PROCEDURE — 3077F PR MOST RECENT SYSTOLIC BLOOD PRESSURE >= 140 MM HG: ICD-10-PCS | Mod: CPTII,,, | Performed by: PODIATRIST

## 2022-10-04 PROCEDURE — 99214 PR OFFICE/OUTPT VISIT, EST, LEVL IV, 30-39 MIN: ICD-10-PCS | Mod: 24,25,, | Performed by: PODIATRIST

## 2022-10-04 PROCEDURE — 11042 PR DEBRIDEMENT, SKIN, SUB-Q TISSUE,=<20 SQ CM: ICD-10-PCS | Mod: 79,59,, | Performed by: PODIATRIST

## 2022-10-04 PROCEDURE — 3051F HG A1C>EQUAL 7.0%<8.0%: CPT | Mod: CPTII,,, | Performed by: PODIATRIST

## 2022-10-04 PROCEDURE — 11043 PR DEBRIDEMENT, SKIN, SUB-Q TISSUE,MUSCLE,=<20 SQ CM: ICD-10-PCS | Mod: 79,,, | Performed by: PODIATRIST

## 2022-10-04 PROCEDURE — 11043 DBRDMT MUSC&/FSCA 1ST 20/<: CPT | Performed by: PODIATRIST

## 2022-10-04 PROCEDURE — 3051F PR MOST RECENT HEMOGLOBIN A1C LEVEL 7.0 - < 8.0%: ICD-10-PCS | Mod: CPTII,,, | Performed by: PODIATRIST

## 2022-10-04 PROCEDURE — 11043 DBRDMT MUSC&/FSCA 1ST 20/<: CPT | Mod: 79,,, | Performed by: PODIATRIST

## 2022-10-04 PROCEDURE — 3066F NEPHROPATHY DOC TX: CPT | Mod: CPTII,,, | Performed by: PODIATRIST

## 2022-10-04 PROCEDURE — 3066F PR DOCUMENTATION OF TREATMENT FOR NEPHROPATHY: ICD-10-PCS | Mod: CPTII,,, | Performed by: PODIATRIST

## 2022-10-04 PROCEDURE — 11042 DBRDMT SUBQ TIS 1ST 20SQCM/<: CPT | Mod: 59 | Performed by: PODIATRIST

## 2022-10-04 PROCEDURE — 1159F PR MEDICATION LIST DOCUMENTED IN MEDICAL RECORD: ICD-10-PCS | Mod: CPTII,,, | Performed by: PODIATRIST

## 2022-10-04 PROCEDURE — 1159F MED LIST DOCD IN RCRD: CPT | Mod: CPTII,,, | Performed by: PODIATRIST

## 2022-10-04 PROCEDURE — 4010F ACE/ARB THERAPY RXD/TAKEN: CPT | Mod: CPTII,,, | Performed by: PODIATRIST

## 2022-10-04 PROCEDURE — 3077F SYST BP >= 140 MM HG: CPT | Mod: CPTII,,, | Performed by: PODIATRIST

## 2022-10-04 PROCEDURE — 1160F PR REVIEW ALL MEDS BY PRESCRIBER/CLIN PHARMACIST DOCUMENTED: ICD-10-PCS | Mod: CPTII,,, | Performed by: PODIATRIST

## 2022-10-04 NOTE — PROGRESS NOTES
[address] [facility] [address]  [place] [address]  Phone: [phone]   [phone]    Patient's PCP:Stefano Lopez MD  Referring Provider: No ref. provider found    Subjective:      Chief Complaint:: Wound Care, Non-healing Wound, Pressure Ulcer, Diabetic Foot Ulcer, Diabetes, and Wound Check    HPI    Discussed with patient her follow-up with vascular as she has had vascular intervention to the left lower leg and foot.  However, patient states that she does not have a follow-up with vascular and has not seen vascular since her surgical intervention.  Additionally, she states she is not taking any blood thinners at this time.  She states she was being given them while in the hospital, but no one mentioned her continuing to take them outside of the hospital.  Reviewed her discharge summary from the hospital on August 30th, and it does appear that she was supposed to be taking Plavix 75 mg Therefore, once she was discharge, she has not taken blood thinners (plavix).  I am concerned that she has possibly clotted off her vascular intervention (done on 06/24/2022) as her left foot has not shown much improvement over the past 2 weeks.   Contacted Dr. Haas her vascular surgeon through [facility] to determine what he would like to do regarding patient not taking Plavix and my concerns for stenosis.  Patient also given number to Dr. Haas's office to contact his office to schedule an appointment.       Patient presents for follow-up of right and left diabetic foot ulcers.   Multiple ulcers present on both right and left foot.  See Wound docs for full assessments and evaluations of all wounds        Leanna García is a 57 y.o. female who presents today with a complaint of right foot ulcer, left midfoot ulcer, left heel ulcer, and left post amputation site of great toe.         1. Debridement of left midfoot ulcer.  Debridement of 10% of left heel wound.    Evaluation and assessment only of all remaining  ulcers on both right and left foot. See Wound Docs for assessment of wounds and procedure notes  2. Continue taking all medications as prescribed.  3. Continue home health dressing changes, wound vac to left plantar foot wound.  Betadine followed by bulky wrap compression to heel wound.  Betadine with border foam to right foot wound.   4. RTC one week   5. Counseled patient on increasing protein intake, not getting wound wet, keeping dressing clean dry and intact, following a healthy diet, elevating legs when able, removing pressure from wound     Total time spent for E&M 35  Total time for debridement 35 minutes            Patient Name: Leanna García  MRN: [id number]  Patient Class: IP- Inpatient  Admission Date: 8/18/2022  Hospital Length of Stay: 10 days  Discharge Date and Time: 8/30/2022  3:20 PM  Attending Physician: Precious att. providers found   Discharging Provider: Andrea Montoya MD  Primary Care Provider: Stefano Lopez MD     57-year-old  female with multiple medical comorbidities including but not limited to type 2 diabetes mellitus complicated by diabetic foot ulcer, ESRD on hemodialysis, hyperlipidemia presented to the ED with nausea and vomiting as well as redness involving the right foot wound.     Diagnosed with diabetic gastroparesis and symptoms resolved with metoclopramide.  She was recently discharged after prolonged LTAC stay for IV antibiotics of the left diabetic foot ulcer.  She was evaluated by Podiatry.  She underwent bedside excision debridement of the left midfoot on 08/19.  MRI revealed acute osteomyelitis of the left great toe involving proximal and distal phalanges.  This was followed by bone biopsy of the left hallux and excisional debridement with cultures growing Proteus mirabilis.  Eventually patient underwent amputation of the 1st toe and partial 1st metatarsal head amputation 8/26.  Seen by Infectious Disease.  Initially on broad-spectrum antibiotics and eventually  de-escalated to intravenous cefepime which she will continue for 2 more weeks.     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  Fistulogram revealed large thrombus with aneurysms.  Underwent fistula repair in the OR.     Stable for discharge to home with resumption of home health.  Wound care orders sent.  Patient aware to follow-up with PCP, Podiatry and ID.  Discharge instructions and return precautions reviewed.     I have seen the patient on the day of discharge and reviewed the discharge instructions as outlined below. Patient verbalized understanding and is aware to contact primary care physician or return to ED if new or worsening symptoms.     I counseled the patient on their conditions, their implications and medical management.     >50% of this > 60 minute visit was spent face to face educating/counseling the patient     I spent a total of 60 minutes on the day of the visit.This includes face to face time and non-face to face time preparing to see the patient (eg, review of tests), obtaining and/or reviewing separately obtained history, documenting clinical information in the electronic or other health record, independently interpreting results and communicating results to the patient/family/caregiver, or care coordinator.        Counseling/Education:  I provided patient education verbally regarding:   The aspects of diabetes and how it pertains to the feet. I explained the importance of proper diabetic foot care and how it is essential for the health of their feet.     I discussed the importance of knowing their Hemoglobin A1c and that the level needs to be as close to 6 as possible. I discussed the increase complications of high blood sugar including stroke, blindness, heart attack, kidney failure and loss of limb secondary to neuropathy and PVD.      With neuropathy, beware of any breaks in the skin or redness. These areas are not recognized early  due to the numbness.     I discussed Diabetes, lower back issues, metabolic disorders, systemic causes, chemotherapy, vitamin deficiency, heavy metal exposure, as some of the causes. I also explained that as much as 40% of the time we can not find a cause. I discussed different treatments available to control the symptoms but which may not cure the problem.         Counseled patient on the aspects of diabetes and how it pertains to the feet.  I explained the importance of proper diabetic foot care and how it is essential for the health of their feet.        Shoe inspection. Patient instructed on proper foot hygeine. We discussed wearing proper shoe gear, daily foot inspections, never walking without protective shoe gear, never putting sharp instruments to feet, routine podiatric nail visits every 2-3 months.             Patient should call the office immediately if any signs of infection, such as fever, chills, sweats, increased redness or pain.     Patient was instructed to call the clinic or go to the emergency department if their symptoms do not improve, worsens, or if new symptoms develop.  Patient was advised that if any increased swelling, pain, or numbness arise to go immediately to the ED. Patient knows to call any time if an emergency arises. Shared decision making occurred and patient verbalized understanding in agreement with this plan.       Much of the documentation for this visit was completed in the Wound Docs system.  Please see the attached documentation for further details about the patient's care. Scanned under the Media tab.         Alivia Bruno DPM     Vitals:    10/04/22 1313   BP: (!) 144/100   Pulse: 77   Resp: 20   Temp: 98.2 °F (36.8 °C)   PainSc: 0-No pain      Shoe Size:     Past Surgical History:   Procedure Laterality Date    AV FISTULA PLACEMENT Left 8/29/2022    Procedure: CREATION, AV FISTULA;  Surgeon: Ali Khoobehi, MD;  Location: [facility] OR;  Service: Vascular;  Laterality: Left;     BONE BIOPSY Left 8/24/2022    Procedure: Biopsy-Bone;  Surgeon: Porfirio Ponce DPM;  Location: [facility] OR;  Service: Podiatry;  Laterality: Left;    COLONOSCOPY N/A 10/5/2016    Procedure: COLONOSCOPY;  Surgeon: Pillo Chanel MD;  Location: [facility] ENDO;  Service: Endoscopy;  Laterality: N/A;    COLONOSCOPY N/A 4/26/2022    Procedure: COLONOSCOPY;  Surgeon: Saravanan Rachel MD;  Location: [facility] ENDO;  Service: Endoscopy;  Laterality: N/A;    FISTULOGRAM Left 8/24/2022    Procedure: Fistulogram;  Surgeon: Ali Khoobehi, MD;  Location: [facility] CATH/EP LAB;  Service: Vascular;  Laterality: Left;    HERNIA REPAIR Bilateral 11/22/2016    inguinal    HYSTERECTOMY      INCISION AND DRAINAGE FOOT Left 5/13/2022    Procedure: INCISION AND DRAINAGE, FOOT;  Surgeon: Ricardo Bruno DPM;  Location: [facility] OR;  Service: Podiatry;  Laterality: Left;    OOPHORECTOMY      THROMBECTOMY, AV FISTULA, UPPER EXTREMITY, PERCUTANEOUS  8/24/2022    Procedure: THROMBECTOMY, AV FISTULA, UPPER EXTREMITY, PERCUTANEOUS;  Surgeon: Ali Khoobehi, MD;  Location: [facility] CATH/EP LAB;  Service: Vascular;;    TOE AMPUTATION Left 8/26/2022    Procedure: AMPUTATION, TOE;  Surgeon: Porfirio Ponce DPM;  Location: [facility] OR;  Service: Podiatry;  Laterality: Left;     Past Medical History:   Diagnosis Date    A-fib     resolved per patient    Anemia due to end stage renal disease 6/30/2022    Arthritis     Bronchitis     Cardiovascular event risk, ASCVD 10-year risk 6.7% 10/15/2016    Diabetes mellitus type II     Diabetic foot infection     Diabetic ulcer of left midfoot 05/05/2022    Disorder of kidney and ureter     Encounter for blood transfusion     ESRD (end stage renal disease) 05/18/2018    MANJINDER (generalized anxiety disorder) 10/25/2016    History of colon polyps 11/02/2016    Hyperlipidemia     Hypertension     Stroke      Family History   Problem Relation Age of Onset    Hyperlipidemia Mother     Hypertension Mother     Hypertension Father     Diabetes  Father     Diabetes Sister     Diabetes Sister     Diabetes Maternal Aunt     Diabetes Maternal Grandmother     Heart disease Maternal Grandmother     Cancer Paternal Grandmother     Breast cancer Neg Hx     Colon cancer Neg Hx     Ovarian cancer Neg Hx         Social History:   Marital Status:   Alcohol History:  reports no history of alcohol use.  Tobacco History:  reports that she has never smoked. She has never used smokeless tobacco.  Drug History:  reports no history of drug use.    Review of patient's allergies indicates:   Allergen Reactions    Dilaudid [hydromorphone] Nausea And Vomiting    Chlorhexidine Itching    Lortab [hydrocodone-acetaminophen] Itching       Current Outpatient Medications   Medication Sig Dispense Refill    amLODIPine (NORVASC) 10 MG tablet Take 1 tablet (10 mg total) by mouth once daily.      aspirin (ECOTRIN) 81 MG EC tablet Take 81 mg by mouth once daily.      atorvastatin (LIPITOR) 80 MG tablet Take 1 tablet (80 mg total) by mouth once daily. 90 tablet 3    azelastine (ASTELIN) 137 mcg (0.1 %) nasal spray 1 spray (137 mcg total) by Nasal route 2 (two) times a day. 30 mL 2    blood sugar diagnostic Strp To check BG 4 times daily, to use with insurance preferred meter (Patient taking differently: 1 strip by Misc.(Non-Drug; Combo Route) route 4 (four) times daily. To check BG 4 times daily, to use with insurance preferred meter) 450 strip 3    calcium acetate,phosphat bind, (PHOSLO) 667 mg capsule Take 1 capsule (667 mg total) by mouth 3 (three) times daily with meals.      cetirizine (ZYRTEC) 10 MG tablet Take 1 tablet (10 mg total) by mouth every other day. 45 tablet 3    clopidogreL (PLAVIX) 75 mg tablet Take 1 tablet (75 mg total) by mouth once daily. (Patient not taking: Reported on 9/29/2022) 90 tablet 3    epoetin chris-epbx (RETACRIT) 4,000 unit/mL injection Inject 1.11 mLs (4,440 Units total) into the skin every Mon, Wed, Fri.      fluticasone propionate (FLONASE) 50  "mcg/actuation nasal spray 2 sprays (100 mcg total) by Each Nostril route once daily. 16 g 3    gabapentin (NEURONTIN) 100 MG capsule Take 1 capsule (100 mg total) by mouth 2 (two) times daily. 180 capsule 3    hydrALAZINE (APRESOLINE) 50 MG tablet Take 1 tablet (50 mg total) by mouth every 12 (twelve) hours. 180 tablet 3    insulin aspart U-100 (NOVOLOG FLEXPEN U-100 INSULIN) 100 unit/mL (3 mL) InPn pen Inject 5 Units into the skin 3 (three) times daily with meals. 3 mL 6    insulin detemir U-100 (LEVEMIR FLEXTOUCH U-100 INSULN) 100 unit/mL (3 mL) InPn pen Inject 15 Units into the skin 2 (two) times daily. 27 mL 3    lactulose (CHRONULAC) 10 gram/15 mL solution Take 20 g by mouth 2 (two) times a day.      losartan (COZAAR) 100 MG tablet Take 1 tablet (100 mg total) by mouth once daily.      ondansetron (ZOFRAN-ODT) 8 MG TbDL Take 1 tablet (8 mg total) by mouth every 8 (eight) hours as needed (nausea).      oxybutynin (DITROPAN) 5 MG Tab Take 1 tablet by mouth.      oxyCODONE (ROXICODONE) 5 MG immediate release tablet Take 1 tablet (5 mg total) by mouth every 6 (six) hours as needed for Pain. (Patient not taking: No sig reported)      pen needle, diabetic (BD ULTRA-FINE ROBERT PEN NEEDLE) 32 gauge x 5/32" Ndle To use 4 times per day with insulin injections. (Patient taking differently: 1 pen by Misc.(Non-Drug; Combo Route) route 4 (four) times daily. To use 4 times per day with insulin injections.) 450 each 2    polyethylene glycol (GLYCOLAX) 17 gram PwPk Take 17 g by mouth 2 (two) times daily as needed.      senna-docusate 8.6-50 mg (PERICOLACE) 8.6-50 mg per tablet Take 1 tablet by mouth 2 (two) times daily.       No current facility-administered medications for this visit.       Review of Systems   Constitutional:  Negative for chills, fatigue, fever and unexpected weight change.   HENT:  Negative for hearing loss and trouble swallowing.    Eyes:  Negative for photophobia and visual disturbance.   Respiratory:  " Negative for cough, shortness of breath and wheezing.    Cardiovascular:  Negative for chest pain, palpitations and leg swelling.   Gastrointestinal:  Negative for abdominal pain and nausea.   Genitourinary:  Negative for dysuria and frequency.   Musculoskeletal:  Negative for arthralgias, back pain, gait problem, joint swelling and myalgias.   Skin:  Positive for wound. Negative for rash.   Neurological:  Negative for tremors, seizures, weakness, numbness and headaches.   Hematological:  Does not bruise/bleed easily.       Objective:        Physical Exam:   Foot Exam  Physical Exam  Ortho/SPM Exam     Imaging:            Assessment:       1. Ulcer of left foot with necrosis of muscle    2. Type 2 diabetes mellitus with hypoglycemia and coma, with long-term current use of insulin    3. Ulcer of toe of left foot, unspecified ulcer stage    4. Diabetic ulcer of left midfoot associated with type 2 diabetes mellitus, with necrosis of muscle    5. History of TIA (transient ischemic attack)    6. 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 Type 2 diabetes mellitus with chronic kidney disease on chronic dialysis, with long-term current use of insulin    8. Difficulty in walking, not elsewhere classified    9. Peripheral vascular disease    10. At high risk for inadequate nutritional intake    11. Ulcer of right foot, unspecified ulcer stage    12. Diabetic foot infection    13. Decreased pedal pulses    14. History of amputation of left foot    15. ESRD (end stage renal disease)    16. At risk for readmission to hospital    17. Heel ulcer, left, with necrosis of muscle    18. Diabetic ulcer of other part of right foot associated with diabetes mellitus due to underlying condition, unspecified ulcer stage    19. Eschar of heel    20. Diabetic wet gangrene of the foot      Plan:   Ulcer of left foot with necrosis of muscle    Type 2 diabetes mellitus with hypoglycemia and coma, with long-term current use of  insulin    Ulcer of toe of left foot, unspecified ulcer stage    Diabetic ulcer of left midfoot associated with type 2 diabetes mellitus, with necrosis of muscle    History of TIA (transient ischemic attack)    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    Type 2 diabetes mellitus with chronic kidney disease on chronic dialysis, with long-term current use of insulin    Difficulty in walking, not elsewhere classified    Peripheral vascular disease  -     Ambulatory referral/consult to Vascular Surgery; Future; Expected date: 10/11/2022    At high risk for inadequate nutritional intake    Ulcer of right foot, unspecified ulcer stage    Diabetic foot infection    Decreased pedal pulses    History of amputation of left foot    ESRD (end stage renal disease)    At risk for readmission to hospital    Heel ulcer, left, with necrosis of muscle    Diabetic ulcer of other part of right foot associated with diabetes mellitus due to underlying condition, unspecified ulcer stage    Eschar of heel    Diabetic wet gangrene of the foot    Follow up in about 1 week (around 10/11/2022).    Procedures          Counseling:     I provided patient education verbally regarding:   Patient diagnosis, treatment options, as well as alternatives, risks, and benefits.     This note was created using Dragon voice recognition software that occasionally misinterpreted phrases or words.

## 2022-10-11 ENCOUNTER — OFFICE VISIT (OUTPATIENT)
Dept: WOUND CARE | Facility: HOSPITAL | Age: 57
End: 2022-10-11
Attending: PODIATRIST
Payer: MEDICARE

## 2022-10-11 VITALS
RESPIRATION RATE: 18 BRPM | SYSTOLIC BLOOD PRESSURE: 135 MMHG | DIASTOLIC BLOOD PRESSURE: 64 MMHG | TEMPERATURE: 98 F | HEART RATE: 78 BPM

## 2022-10-11 DIAGNOSIS — E11.22 TYPE 2 DIABETES MELLITUS WITH CHRONIC KIDNEY DISEASE ON CHRONIC DIALYSIS, WITH LONG-TERM CURRENT USE OF INSULIN: ICD-10-CM

## 2022-10-11 DIAGNOSIS — L97.423 HEEL ULCER, LEFT, WITH NECROSIS OF MUSCLE: ICD-10-CM

## 2022-10-11 DIAGNOSIS — L97.519 DIABETIC ULCER OF OTHER PART OF RIGHT FOOT ASSOCIATED WITH DIABETES MELLITUS DUE TO UNDERLYING CONDITION, UNSPECIFIED ULCER STAGE: ICD-10-CM

## 2022-10-11 DIAGNOSIS — L08.9 DIABETIC FOOT INFECTION: ICD-10-CM

## 2022-10-11 DIAGNOSIS — N18.6 TYPE 2 DIABETES MELLITUS WITH CHRONIC KIDNEY DISEASE ON CHRONIC DIALYSIS, WITH LONG-TERM CURRENT USE OF INSULIN: ICD-10-CM

## 2022-10-11 DIAGNOSIS — Z79.4 TYPE 2 DIABETES MELLITUS WITH HYPOGLYCEMIA AND COMA, WITH LONG-TERM CURRENT USE OF INSULIN: ICD-10-CM

## 2022-10-11 DIAGNOSIS — N18.6 ESRD (END STAGE RENAL DISEASE): ICD-10-CM

## 2022-10-11 DIAGNOSIS — R23.4 ESCHAR OF HEEL: ICD-10-CM

## 2022-10-11 DIAGNOSIS — E11.641 TYPE 2 DIABETES MELLITUS WITH HYPOGLYCEMIA AND COMA, WITH LONG-TERM CURRENT USE OF INSULIN: ICD-10-CM

## 2022-10-11 DIAGNOSIS — L97.529 ULCER OF TOE OF LEFT FOOT, UNSPECIFIED ULCER STAGE: ICD-10-CM

## 2022-10-11 DIAGNOSIS — L97.523 ULCER OF LEFT FOOT WITH NECROSIS OF MUSCLE: Primary | ICD-10-CM

## 2022-10-11 DIAGNOSIS — L97.519 ULCER OF RIGHT FOOT, UNSPECIFIED ULCER STAGE: ICD-10-CM

## 2022-10-11 DIAGNOSIS — E11.649 TYPE 2 DIABETES MELLITUS WITH HYPOGLYCEMIA UNAWARENESS: ICD-10-CM

## 2022-10-11 DIAGNOSIS — Z91.89 AT HIGH RISK FOR INADEQUATE NUTRITIONAL INTAKE: ICD-10-CM

## 2022-10-11 DIAGNOSIS — E11.621 DIABETIC ULCER OF LEFT MIDFOOT ASSOCIATED WITH TYPE 2 DIABETES MELLITUS, WITH NECROSIS OF MUSCLE: ICD-10-CM

## 2022-10-11 DIAGNOSIS — E11.628 DIABETIC FOOT INFECTION: ICD-10-CM

## 2022-10-11 DIAGNOSIS — Z91.89 AT RISK FOR READMISSION TO HOSPITAL: ICD-10-CM

## 2022-10-11 DIAGNOSIS — Z89.432 HISTORY OF AMPUTATION OF LEFT FOOT: ICD-10-CM

## 2022-10-11 DIAGNOSIS — L97.423 DIABETIC ULCER OF LEFT MIDFOOT ASSOCIATED WITH TYPE 2 DIABETES MELLITUS, WITH NECROSIS OF MUSCLE: ICD-10-CM

## 2022-10-11 DIAGNOSIS — Z79.4 TYPE 2 DIABETES MELLITUS WITH CHRONIC KIDNEY DISEASE ON CHRONIC DIALYSIS, WITH LONG-TERM CURRENT USE OF INSULIN: ICD-10-CM

## 2022-10-11 DIAGNOSIS — Z99.2 TYPE 2 DIABETES MELLITUS WITH CHRONIC KIDNEY DISEASE ON CHRONIC DIALYSIS, WITH LONG-TERM CURRENT USE OF INSULIN: ICD-10-CM

## 2022-10-11 DIAGNOSIS — Z86.73 HISTORY OF TIA (TRANSIENT ISCHEMIC ATTACK): ICD-10-CM

## 2022-10-11 DIAGNOSIS — R09.89 DECREASED PEDAL PULSES: ICD-10-CM

## 2022-10-11 DIAGNOSIS — E08.621 DIABETIC ULCER OF OTHER PART OF RIGHT FOOT ASSOCIATED WITH DIABETES MELLITUS DUE TO UNDERLYING CONDITION, UNSPECIFIED ULCER STAGE: ICD-10-CM

## 2022-10-11 DIAGNOSIS — L03.119 CELLULITIS AND ABSCESS OF FOOT: ICD-10-CM

## 2022-10-11 DIAGNOSIS — L02.619 CELLULITIS AND ABSCESS OF FOOT: ICD-10-CM

## 2022-10-11 DIAGNOSIS — I73.9 PERIPHERAL VASCULAR DISEASE: ICD-10-CM

## 2022-10-11 DIAGNOSIS — R26.2 DIFFICULTY IN WALKING, NOT ELSEWHERE CLASSIFIED: ICD-10-CM

## 2022-10-11 LAB

## 2022-10-11 PROCEDURE — 99214 PR OFFICE/OUTPT VISIT, EST, LEVL IV, 30-39 MIN: ICD-10-PCS | Mod: 25,24,, | Performed by: PODIATRIST

## 2022-10-11 PROCEDURE — 4010F PR ACE/ARB THEARPY RXD/TAKEN: ICD-10-PCS | Mod: CPTII,,, | Performed by: PODIATRIST

## 2022-10-11 PROCEDURE — 11043 PR DEBRIDEMENT, SKIN, SUB-Q TISSUE,MUSCLE,=<20 SQ CM: ICD-10-PCS | Mod: 79,,, | Performed by: PODIATRIST

## 2022-10-11 PROCEDURE — 3066F PR DOCUMENTATION OF TREATMENT FOR NEPHROPATHY: ICD-10-PCS | Mod: CPTII,,, | Performed by: PODIATRIST

## 2022-10-11 PROCEDURE — 87186 SC STD MICRODIL/AGAR DIL: CPT | Performed by: PODIATRIST

## 2022-10-11 PROCEDURE — 87070 CULTURE OTHR SPECIMN AEROBIC: CPT | Performed by: PODIATRIST

## 2022-10-11 PROCEDURE — 99214 OFFICE O/P EST MOD 30 MIN: CPT | Mod: 25,24,, | Performed by: PODIATRIST

## 2022-10-11 PROCEDURE — 3075F SYST BP GE 130 - 139MM HG: CPT | Mod: CPTII,,, | Performed by: PODIATRIST

## 2022-10-11 PROCEDURE — 1159F MED LIST DOCD IN RCRD: CPT | Mod: CPTII,,, | Performed by: PODIATRIST

## 2022-10-11 PROCEDURE — 11042 DBRDMT SUBQ TIS 1ST 20SQCM/<: CPT | Mod: 59 | Performed by: PODIATRIST

## 2022-10-11 PROCEDURE — 87077 CULTURE AEROBIC IDENTIFY: CPT | Performed by: PODIATRIST

## 2022-10-11 PROCEDURE — 3066F NEPHROPATHY DOC TX: CPT | Mod: CPTII,,, | Performed by: PODIATRIST

## 2022-10-11 PROCEDURE — 87075 CULTR BACTERIA EXCEPT BLOOD: CPT | Performed by: PODIATRIST

## 2022-10-11 PROCEDURE — 11043 DBRDMT MUSC&/FSCA 1ST 20/<: CPT | Mod: 79,,, | Performed by: PODIATRIST

## 2022-10-11 PROCEDURE — 11042 PR DEBRIDEMENT, SKIN, SUB-Q TISSUE,=<20 SQ CM: ICD-10-PCS | Mod: 79,59,, | Performed by: PODIATRIST

## 2022-10-11 PROCEDURE — 3078F DIAST BP <80 MM HG: CPT | Mod: CPTII,,, | Performed by: PODIATRIST

## 2022-10-11 PROCEDURE — 1160F RVW MEDS BY RX/DR IN RCRD: CPT | Mod: CPTII,,, | Performed by: PODIATRIST

## 2022-10-11 PROCEDURE — 3051F PR MOST RECENT HEMOGLOBIN A1C LEVEL 7.0 - < 8.0%: ICD-10-PCS | Mod: CPTII,,, | Performed by: PODIATRIST

## 2022-10-11 PROCEDURE — 1159F PR MEDICATION LIST DOCUMENTED IN MEDICAL RECORD: ICD-10-PCS | Mod: CPTII,,, | Performed by: PODIATRIST

## 2022-10-11 PROCEDURE — 3075F PR MOST RECENT SYSTOLIC BLOOD PRESS GE 130-139MM HG: ICD-10-PCS | Mod: CPTII,,, | Performed by: PODIATRIST

## 2022-10-11 PROCEDURE — 1160F PR REVIEW ALL MEDS BY PRESCRIBER/CLIN PHARMACIST DOCUMENTED: ICD-10-PCS | Mod: CPTII,,, | Performed by: PODIATRIST

## 2022-10-11 PROCEDURE — 3051F HG A1C>EQUAL 7.0%<8.0%: CPT | Mod: CPTII,,, | Performed by: PODIATRIST

## 2022-10-11 PROCEDURE — 3078F PR MOST RECENT DIASTOLIC BLOOD PRESSURE < 80 MM HG: ICD-10-PCS | Mod: CPTII,,, | Performed by: PODIATRIST

## 2022-10-11 PROCEDURE — 11043 DBRDMT MUSC&/FSCA 1ST 20/<: CPT | Performed by: PODIATRIST

## 2022-10-11 PROCEDURE — 4010F ACE/ARB THERAPY RXD/TAKEN: CPT | Mod: CPTII,,, | Performed by: PODIATRIST

## 2022-10-11 PROCEDURE — 11042 DBRDMT SUBQ TIS 1ST 20SQCM/<: CPT | Mod: 79,59,, | Performed by: PODIATRIST

## 2022-10-11 NOTE — PROGRESS NOTES
[address] [facility] [address]  [place] [address]  Phone: [phone]   [phone]    Patient's PCP:Stefano Lopez MD  Referring Provider: No ref. provider found    Subjective:      Chief Complaint:: Wound Care, Wound Infection, Non-healing Wound, Pressure Ulcer, Diabetes, Wound Check, and Diabetic Foot Ulcer    HPI    Patient presents for follow-up of right and left diabetic foot ulcers.   Multiple ulcers present on both right and left foot.  See Wound docs for full assessments and evaluations of all wounds          Taken from last visit on 10/04/22: (patient has apt with Dr. Haas today)  Discussed with patient her follow-up with vascular as she has had vascular intervention to the left lower leg and foot.  However, patient states that she does not have a follow-up with vascular and has not seen vascular since her surgical intervention.  Additionally, she states she is not taking any blood thinners at this time.  She states she was being given them while in the hospital, but no one mentioned her continuing to take them outside of the hospital.  Reviewed her discharge summary from the hospital on August 30th, and it does appear that she was supposed to be taking Plavix 75 mg Therefore, once she was discharge, she has not taken blood thinners (plavix).  I am concerned that she has possibly clotted off her vascular intervention (done on 06/24/2022) as her left foot has not shown much improvement over the past 2 weeks.   Contacted Dr. Haas her vascular surgeon through [facility] to determine what he would like to do regarding patient not taking Plavix and my concerns for stenosis.  Patient also given number to Dr. Haas's office to contact his office to schedule an appointment.        Leanna García is a 57 y.o. female who presents today with a complaint of right foot ulcer, left midfoot ulcer, left heel ulcer, and left post amputation site of great toe.         1. Debridement of left midfoot  ulcer.  Debridement of left lower leg wound.  Evaluation and assessment only of all remaining ulcers on both right and left foot. See Wound Docs for assessment of wounds and procedure notes  2. Continue taking all medications as prescribed.  3. Continue home health dressing changes, wound vac to left plantar foot wound.  Betadine followed by bulky wrap compression to heel wound.  Betadine with border foam to right foot wound.   4. RTC one week   5. Counseled patient on increasing protein intake, not getting wound wet, keeping dressing clean dry and intact, following a healthy diet, elevating legs when able, removing pressure from wound     Total time spent for E&M 35  Total time for debridement 35 minutes         Culture taken of wound, left heel, from border of wound. I will adjust antibiotics accordingly once the results of the culture return       Patient Name: Leanna García  MRN: [id number]  Patient Class: IP- Inpatient  Admission Date: 8/18/2022  Hospital Length of Stay: 10 days  Discharge Date and Time: 8/30/2022  3:20 PM  Attending Physician: No att. providers found   Discharging Provider: Andrea Montoya MD  Primary Care Provider: Stefano Lopez MD     57-year-old  female with multiple medical comorbidities including but not limited to type 2 diabetes mellitus complicated by diabetic foot ulcer, ESRD on hemodialysis, hyperlipidemia presented to the ED with nausea and vomiting as well as redness involving the right foot wound.     Diagnosed with diabetic gastroparesis and symptoms resolved with metoclopramide.  She was recently discharged after prolonged LTAC stay for IV antibiotics of the left diabetic foot ulcer.  She was evaluated by Podiatry.  She underwent bedside excision debridement of the left midfoot on 08/19.  MRI revealed acute osteomyelitis of the left great toe involving proximal and distal phalanges.  This was followed by bone biopsy of the left hallux and excisional debridement  with cultures growing Proteus mirabilis.  Eventually patient underwent amputation of the 1st toe and partial 1st metatarsal head amputation 8/26.  Seen by Infectious Disease.  Initially on broad-spectrum antibiotics and eventually de-escalated to intravenous cefepime which she will continue for 2 more weeks.     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  Fistulogram revealed large thrombus with aneurysms.  Underwent fistula repair in the OR.     Stable for discharge to home with resumption of home health.  Wound care orders sent.  Patient aware to follow-up with PCP, Podiatry and ID.  Discharge instructions and return precautions reviewed.     I have seen the patient on the day of discharge and reviewed the discharge instructions as outlined below. Patient verbalized understanding and is aware to contact primary care physician or return to ED if new or worsening symptoms.     I counseled the patient on their conditions, their implications and medical management.     >50% of this > 60 minute visit was spent face to face educating/counseling the patient     I spent a total of 60 minutes on the day of the visit.This includes face to face time and non-face to face time preparing to see the patient (eg, review of tests), obtaining and/or reviewing separately obtained history, documenting clinical information in the electronic or other health record, independently interpreting results and communicating results to the patient/family/caregiver, or care coordinator.        Counseling/Education:  I provided patient education verbally regarding:   The aspects of diabetes and how it pertains to the feet. I explained the importance of proper diabetic foot care and how it is essential for the health of their feet.     I discussed the importance of knowing their Hemoglobin A1c and that the level needs to be as close to 6 as possible. I discussed the increase complications of  high blood sugar including stroke, blindness, heart attack, kidney failure and loss of limb secondary to neuropathy and PVD.      With neuropathy, beware of any breaks in the skin or redness. These areas are not recognized early due to the numbness.     I discussed Diabetes, lower back issues, metabolic disorders, systemic causes, chemotherapy, vitamin deficiency, heavy metal exposure, as some of the causes. I also explained that as much as 40% of the time we can not find a cause. I discussed different treatments available to control the symptoms but which may not cure the problem.         Counseled patient on the aspects of diabetes and how it pertains to the feet.  I explained the importance of proper diabetic foot care and how it is essential for the health of their feet.        Shoe inspection. Patient instructed on proper foot hygeine. We discussed wearing proper shoe gear, daily foot inspections, never walking without protective shoe gear, never putting sharp instruments to feet, routine podiatric nail visits every 2-3 months.             Patient should call the office immediately if any signs of infection, such as fever, chills, sweats, increased redness or pain.     Patient was instructed to call the clinic or go to the emergency department if their symptoms do not improve, worsens, or if new symptoms develop.  Patient was advised that if any increased swelling, pain, or numbness arise to go immediately to the ED. Patient knows to call any time if an emergency arises. Shared decision making occurred and patient verbalized understanding in agreement with this plan.       Much of the documentation for this visit was completed in the Wound Docs system.  Please see the attached documentation for further details about the patient's care. Scanned under the Media tab.         Alivia Bruno DPM          Vitals:    10/11/22 1251   BP: 135/64   Pulse: 78   Resp: 18   Temp: 98.4 °F (36.9 °C)   PainSc: 0-No pain       Shoe Size:     Past Surgical History:   Procedure Laterality Date    AV FISTULA PLACEMENT Left 8/29/2022    Procedure: CREATION, AV FISTULA;  Surgeon: Ali Khoobehi, MD;  Location: [facility] OR;  Service: Vascular;  Laterality: Left;    BONE BIOPSY Left 8/24/2022    Procedure: Biopsy-Bone;  Surgeon: Porfirio Ponce DPM;  Location: [facility] OR;  Service: Podiatry;  Laterality: Left;    COLONOSCOPY N/A 10/5/2016    Procedure: COLONOSCOPY;  Surgeon: Pillo Chanel MD;  Location: [facility] ENDO;  Service: Endoscopy;  Laterality: N/A;    COLONOSCOPY N/A 4/26/2022    Procedure: COLONOSCOPY;  Surgeon: Saravanan Rachel MD;  Location: [facility] ENDO;  Service: Endoscopy;  Laterality: N/A;    FISTULOGRAM Left 8/24/2022    Procedure: Fistulogram;  Surgeon: Ali Khoobehi, MD;  Location: [facility] CATH/EP LAB;  Service: Vascular;  Laterality: Left;    HERNIA REPAIR Bilateral 11/22/2016    inguinal    HYSTERECTOMY      INCISION AND DRAINAGE FOOT Left 5/13/2022    Procedure: INCISION AND DRAINAGE, FOOT;  Surgeon: Ricardo Bruno DPM;  Location: [facility] OR;  Service: Podiatry;  Laterality: Left;    OOPHORECTOMY      THROMBECTOMY, AV FISTULA, UPPER EXTREMITY, PERCUTANEOUS  8/24/2022    Procedure: THROMBECTOMY, AV FISTULA, UPPER EXTREMITY, PERCUTANEOUS;  Surgeon: Ali Khoobehi, MD;  Location: [facility] CATH/EP LAB;  Service: Vascular;;    TOE AMPUTATION Left 8/26/2022    Procedure: AMPUTATION, TOE;  Surgeon: Porfirio Ponce DPM;  Location: [facility];  Service: Podiatry;  Laterality: Left;     Past Medical History:   Diagnosis Date    A-fib     resolved per patient    Anemia due to end stage renal disease 6/30/2022    Arthritis     Bronchitis     Cardiovascular event risk, ASCVD 10-year risk 6.7% 10/15/2016    Diabetes mellitus type II     Diabetic foot infection     Diabetic ulcer of left midfoot 05/05/2022    Disorder of kidney and ureter     Encounter for blood transfusion     ESRD (end stage renal disease) 05/18/2018    MANJINDER (generalized anxiety disorder)  10/25/2016    History of colon polyps 11/02/2016    Hyperlipidemia     Hypertension     Stroke      Family History   Problem Relation Age of Onset    Hyperlipidemia Mother     Hypertension Mother     Hypertension Father     Diabetes Father     Diabetes Sister     Diabetes Sister     Diabetes Maternal Aunt     Diabetes Maternal Grandmother     Heart disease Maternal Grandmother     Cancer Paternal Grandmother     Breast cancer Neg Hx     Colon cancer Neg Hx     Ovarian cancer Neg Hx         Social History:   Marital Status:   Alcohol History:  reports no history of alcohol use.  Tobacco History:  reports that she has never smoked. She has never used smokeless tobacco.  Drug History:  reports no history of drug use.    Review of patient's allergies indicates:   Allergen Reactions    Dilaudid [hydromorphone] Nausea And Vomiting    Chlorhexidine Itching    Lortab [hydrocodone-acetaminophen] Itching       Current Outpatient Medications   Medication Sig Dispense Refill    amLODIPine (NORVASC) 10 MG tablet Take 1 tablet (10 mg total) by mouth once daily.      aspirin (ECOTRIN) 81 MG EC tablet Take 81 mg by mouth once daily.      atorvastatin (LIPITOR) 80 MG tablet Take 1 tablet (80 mg total) by mouth once daily. 90 tablet 3    azelastine (ASTELIN) 137 mcg (0.1 %) nasal spray 1 spray (137 mcg total) by Nasal route 2 (two) times a day. 30 mL 2    blood sugar diagnostic Strp To check BG 4 times daily, to use with insurance preferred meter (Patient taking differently: 1 strip by Misc.(Non-Drug; Combo Route) route 4 (four) times daily. To check BG 4 times daily, to use with insurance preferred meter) 450 strip 3    calcium acetate,phosphat bind, (PHOSLO) 667 mg capsule Take 1 capsule (667 mg total) by mouth 3 (three) times daily with meals.      cetirizine (ZYRTEC) 10 MG tablet Take 1 tablet (10 mg total) by mouth every other day. 45 tablet 3    clopidogreL (PLAVIX) 75 mg tablet Take 1 tablet (75 mg total) by mouth once  "daily. (Patient not taking: Reported on 9/29/2022) 90 tablet 3    epoetin chris-epbx (RETACRIT) 4,000 unit/mL injection Inject 1.11 mLs (4,440 Units total) into the skin every Mon, Wed, Fri.      fluticasone propionate (FLONASE) 50 mcg/actuation nasal spray 2 sprays (100 mcg total) by Each Nostril route once daily. 16 g 3    gabapentin (NEURONTIN) 100 MG capsule Take 1 capsule (100 mg total) by mouth 2 (two) times daily. 180 capsule 3    hydrALAZINE (APRESOLINE) 50 MG tablet Take 1 tablet (50 mg total) by mouth every 12 (twelve) hours. 180 tablet 3    insulin aspart U-100 (NOVOLOG FLEXPEN U-100 INSULIN) 100 unit/mL (3 mL) InPn pen Inject 5 Units into the skin 3 (three) times daily with meals. 3 mL 6    insulin detemir U-100 (LEVEMIR FLEXTOUCH U-100 INSULN) 100 unit/mL (3 mL) InPn pen Inject 15 Units into the skin 2 (two) times daily. 27 mL 3    lactulose (CHRONULAC) 10 gram/15 mL solution Take 20 g by mouth 2 (two) times a day.      losartan (COZAAR) 100 MG tablet Take 1 tablet (100 mg total) by mouth once daily.      ondansetron (ZOFRAN-ODT) 8 MG TbDL Take 1 tablet (8 mg total) by mouth every 8 (eight) hours as needed (nausea).      oxybutynin (DITROPAN) 5 MG Tab Take 1 tablet by mouth.      oxyCODONE (ROXICODONE) 5 MG immediate release tablet Take 1 tablet (5 mg total) by mouth every 6 (six) hours as needed for Pain. (Patient not taking: No sig reported)      pen needle, diabetic (BD ULTRA-FINE ROBERT PEN NEEDLE) 32 gauge x 5/32" Ndle To use 4 times per day with insulin injections. (Patient taking differently: 1 pen by Misc.(Non-Drug; Combo Route) route 4 (four) times daily. To use 4 times per day with insulin injections.) 450 each 2    polyethylene glycol (GLYCOLAX) 17 gram PwPk Take 17 g by mouth 2 (two) times daily as needed.      senna-docusate 8.6-50 mg (PERICOLACE) 8.6-50 mg per tablet Take 1 tablet by mouth 2 (two) times daily.       No current facility-administered medications for this visit.       Review of " Systems   Constitutional:  Negative for chills, fatigue, fever and unexpected weight change.   HENT:  Negative for hearing loss and trouble swallowing.    Eyes:  Negative for photophobia and visual disturbance.   Respiratory:  Negative for cough, shortness of breath and wheezing.    Cardiovascular:  Negative for chest pain, palpitations and leg swelling.   Gastrointestinal:  Negative for abdominal pain and nausea.   Genitourinary:  Negative for dysuria and frequency.   Musculoskeletal:  Negative for arthralgias, back pain, gait problem, joint swelling and myalgias.   Skin:  Positive for wound. Negative for rash.   Neurological:  Negative for tremors, seizures, weakness, numbness and headaches.   Hematological:  Does not bruise/bleed easily.       Objective:        Physical Exam:   Foot Exam  Physical Exam  Ortho/SPM Exam     Imaging:            Assessment:       1. Ulcer of left foot with necrosis of muscle    2. Ulcer of toe of left foot, unspecified ulcer stage    3. Type 2 diabetes mellitus with hypoglycemia and coma, with long-term current use of insulin    4. At high risk for inadequate nutritional intake    5. Peripheral vascular disease    6. Diabetic ulcer of left midfoot associated with type 2 diabetes mellitus, with necrosis of muscle    7. Difficulty in walking, not elsewhere classified    8. History of TIA (transient ischemic attack)    9. 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    10. Type 2 diabetes mellitus with chronic kidney disease on chronic dialysis, with long-term current use of insulin    11. Ulcer of right foot, unspecified ulcer stage    12. Diabetic foot infection    13. Decreased pedal pulses    14. History of amputation of left foot    15. ESRD (end stage renal disease)    16. Eschar of heel    17. Diabetic ulcer of other part of right foot associated with diabetes mellitus due to underlying condition, unspecified ulcer stage    18. Heel ulcer, left, with necrosis of muscle     19. At risk for readmission to hospital    20. Cellulitis and abscess of foot      Plan:   Ulcer of left foot with necrosis of muscle    Ulcer of toe of left foot, unspecified ulcer stage    Type 2 diabetes mellitus with hypoglycemia and coma, with long-term current use of insulin    At high risk for inadequate nutritional intake    Peripheral vascular disease    Diabetic ulcer of left midfoot associated with type 2 diabetes mellitus, with necrosis of muscle    Difficulty in walking, not elsewhere classified    History of TIA (transient ischemic attack)    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    Type 2 diabetes mellitus with chronic kidney disease on chronic dialysis, with long-term current use of insulin    Ulcer of right foot, unspecified ulcer stage    Diabetic foot infection    Decreased pedal pulses    History of amputation of left foot    ESRD (end stage renal disease)    Eschar of heel    Diabetic ulcer of other part of right foot associated with diabetes mellitus due to underlying condition, unspecified ulcer stage    Heel ulcer, left, with necrosis of muscle  -     Culture, Anaerobic  -     CULTURE, AEROBIC  (SPECIFY SOURCE)    At risk for readmission to hospital    Cellulitis and abscess of foot  -     Culture, Anaerobic  -     CULTURE, AEROBIC  (SPECIFY SOURCE)    Follow up in about 1 week (around 10/18/2022).    Procedures          Counseling:     I provided patient education verbally regarding:   Patient diagnosis, treatment options, as well as alternatives, risks, and benefits.     This note was created using Dragon voice recognition software that occasionally misinterpreted phrases or words.

## 2022-10-12 ENCOUNTER — DOCUMENT SCAN (OUTPATIENT)
Dept: HOME HEALTH SERVICES | Facility: HOSPITAL | Age: 57
End: 2022-10-12
Payer: MEDICARE

## 2022-10-12 DIAGNOSIS — E11.65 TYPE 2 DIABETES MELLITUS WITH HYPERGLYCEMIA, WITHOUT LONG-TERM CURRENT USE OF INSULIN: ICD-10-CM

## 2022-10-12 RX ORDER — PEN NEEDLE, DIABETIC 30 GX3/16"
NEEDLE, DISPOSABLE MISCELLANEOUS
Qty: 450 EACH | Refills: 2 | OUTPATIENT
Start: 2022-10-12

## 2022-10-12 NOTE — TELEPHONE ENCOUNTER
No new care gaps identified.  [facility] Embedded Care Gaps. Reference number: [id number]. 10/12/2022   3:00:50 PM CDT

## 2022-10-12 NOTE — TELEPHONE ENCOUNTER
----- Message from Isidro Zhou sent at 10/12/2022 10:55 AM CDT -----  Type: Needs Medical Advice  Who Called:  patient  Best Call Back Number: [phone]   Additional Information: patient forgot to put accuthek abiva+ on the list she gave the doctor order and she also needs more needles for her insulin pen.

## 2022-10-13 ENCOUNTER — PATIENT MESSAGE (OUTPATIENT)
Dept: FAMILY MEDICINE | Facility: CLINIC | Age: 57
End: 2022-10-13
Payer: MEDICARE

## 2022-10-13 RX ORDER — PEN NEEDLE, DIABETIC 30 GX3/16"
NEEDLE, DISPOSABLE MISCELLANEOUS
Qty: 450 EACH | Refills: 2 | Status: SHIPPED | OUTPATIENT
Start: 2022-10-13 | End: 2022-11-08 | Stop reason: SDUPTHER

## 2022-10-13 NOTE — TELEPHONE ENCOUNTER
LOV 9/15/22    Pt informed to schedule appt with John ZUNIGA. Pt states she will call back to schedule. Pt is currently doing wound care and is having trouble with her foot so stated she will call back to schedule.

## 2022-10-13 NOTE — TELEPHONE ENCOUNTER
----- Message from Kristy Dior sent at 10/13/2022  4:34 PM CDT -----  Contact: Patient    Type:  RX Refill Request    Who Called: Patient     Refill or New Rx:Refill    RX Name and Strength:    blood sugar diagnostic Strp    insulin aspart U-100 (NOVOLOG FLEXPEN U-100 INSULIN) 100 unit/mL (3 mL) InPn pen    insulin detemir U-100 (LEVEMIR FLEXTOUCH U-100 INSULN) 100 unit/mL (3 mL) InPn pen      How is the patient currently taking it? (ex. 1XDay): Multiple     Is this a 30 day or 90 day RX: 90    Preferred Pharmacy with phone number:    [facility] [address] [place] [address]  [address]  [facility] [address]  Phone: [phone] Fax: [phone]      Local or Mail Order:Local    Ordering Provider:darlene     Would the patient rather a call back or a response via [facility]? Call    Best Call Back Number:[phone] (home)     Additional Information: Patient needs all three items refilled. Please call patient to advise .          blood sugar diagnostic Strp    insulin aspart U-100 (NOVOLOG FLEXPEN U-100 INSULIN) 100 unit/mL (3 mL) InPn pen    insulin detemir U-100 (LEVEMIR FLEXTOUCH U-100 INSULN) 100 unit/mL (3 mL) InPn pen

## 2022-10-14 LAB — BACTERIA SPEC AEROBE CULT: ABNORMAL

## 2022-10-14 RX ORDER — DOXYCYCLINE 100 MG/1
100 CAPSULE ORAL 2 TIMES DAILY
Qty: 28 CAPSULE | Refills: 0 | Status: ON HOLD | OUTPATIENT
Start: 2022-10-14 | End: 2022-10-20 | Stop reason: SDUPTHER

## 2022-10-14 NOTE — PROGRESS NOTES
Please contact patient to inform them that the results of their culture returned.   She is to begin taking doxycycline in order to cover the results of his culture.  Doxycyline  has been called into his pharmacy.  Thank you

## 2022-10-17 ENCOUNTER — EXTERNAL HOME HEALTH (OUTPATIENT)
Dept: HOME HEALTH SERVICES | Facility: HOSPITAL | Age: 57
End: 2022-10-17
Payer: MEDICARE

## 2022-10-17 ENCOUNTER — HOSPITAL ENCOUNTER (INPATIENT)
Facility: HOSPITAL | Age: 57
LOS: 2 days | Discharge: HOME HEALTH | DRG: 252 | End: 2022-10-20
Attending: EMERGENCY MEDICINE | Admitting: INTERNAL MEDICINE
Payer: MEDICARE

## 2022-10-17 DIAGNOSIS — I73.9 PVD (PERIPHERAL VASCULAR DISEASE): Primary | ICD-10-CM

## 2022-10-17 DIAGNOSIS — N18.6 TYPE 2 DIABETES MELLITUS WITH CHRONIC KIDNEY DISEASE ON CHRONIC DIALYSIS, WITH LONG-TERM CURRENT USE OF INSULIN: ICD-10-CM

## 2022-10-17 DIAGNOSIS — E11.65 TYPE 2 DIABETES MELLITUS WITH HYPERGLYCEMIA, WITHOUT LONG-TERM CURRENT USE OF INSULIN: ICD-10-CM

## 2022-10-17 DIAGNOSIS — Z01.818 PRE-OP EVALUATION: ICD-10-CM

## 2022-10-17 DIAGNOSIS — I73.9 PAD (PERIPHERAL ARTERY DISEASE): ICD-10-CM

## 2022-10-17 DIAGNOSIS — I73.9 PERIPHERAL VASCULAR DISEASE, UNSPECIFIED: ICD-10-CM

## 2022-10-17 DIAGNOSIS — L89.623 PRESSURE INJURY OF LEFT HEEL, STAGE 3: ICD-10-CM

## 2022-10-17 DIAGNOSIS — A49.02 MRSA (METHICILLIN RESISTANT STAPHYLOCOCCUS AUREUS): ICD-10-CM

## 2022-10-17 DIAGNOSIS — Z51.89 ENCOUNTER FOR WOUND RE-CHECK: ICD-10-CM

## 2022-10-17 DIAGNOSIS — Z79.4 TYPE 2 DIABETES MELLITUS WITH CHRONIC KIDNEY DISEASE ON CHRONIC DIALYSIS, WITH LONG-TERM CURRENT USE OF INSULIN: ICD-10-CM

## 2022-10-17 DIAGNOSIS — Z99.2 TYPE 2 DIABETES MELLITUS WITH CHRONIC KIDNEY DISEASE ON CHRONIC DIALYSIS, WITH LONG-TERM CURRENT USE OF INSULIN: ICD-10-CM

## 2022-10-17 DIAGNOSIS — E11.22 TYPE 2 DIABETES MELLITUS WITH CHRONIC KIDNEY DISEASE ON CHRONIC DIALYSIS, WITH LONG-TERM CURRENT USE OF INSULIN: ICD-10-CM

## 2022-10-17 LAB
ALBUMIN SERPL BCP-MCNC: 4.2 G/DL (ref 3.5–5.2)
ALP SERPL-CCNC: 83 U/L (ref 55–135)
ALT SERPL W/O P-5'-P-CCNC: 28 U/L (ref 10–44)
ANION GAP SERPL CALC-SCNC: 9 MMOL/L (ref 8–16)
AST SERPL-CCNC: 24 U/L (ref 10–40)
BACTERIA SPEC ANAEROBE CULT: NORMAL
BASOPHILS # BLD AUTO: 0.07 K/UL (ref 0–0.2)
BASOPHILS NFR BLD: 0.8 % (ref 0–1.9)
BILIRUB SERPL-MCNC: 0.7 MG/DL (ref 0.1–1)
BUN SERPL-MCNC: 33 MG/DL (ref 6–20)
CALCIUM SERPL-MCNC: 9.3 MG/DL (ref 8.7–10.5)
CHLORIDE SERPL-SCNC: 90 MMOL/L (ref 95–110)
CO2 SERPL-SCNC: 35 MMOL/L (ref 23–29)
CREAT SERPL-MCNC: 4.8 MG/DL (ref 0.5–1.4)
DIFFERENTIAL METHOD: ABNORMAL
EOSINOPHIL # BLD AUTO: 0.3 K/UL (ref 0–0.5)
EOSINOPHIL NFR BLD: 3.2 % (ref 0–8)
ERYTHROCYTE [DISTWIDTH] IN BLOOD BY AUTOMATED COUNT: 14 % (ref 11.5–14.5)
EST. GFR  (NO RACE VARIABLE): 10 ML/MIN/1.73 M^2
GLUCOSE SERPL-MCNC: 193 MG/DL (ref 70–110)
GLUCOSE SERPL-MCNC: 199 MG/DL (ref 70–110)
HCT VFR BLD AUTO: 36.2 % (ref 37–48.5)
HGB BLD-MCNC: 11.7 G/DL (ref 12–16)
IMM GRANULOCYTES # BLD AUTO: 0.02 K/UL (ref 0–0.04)
IMM GRANULOCYTES NFR BLD AUTO: 0.2 % (ref 0–0.5)
LYMPHOCYTES # BLD AUTO: 2 K/UL (ref 1–4.8)
LYMPHOCYTES NFR BLD: 22.3 % (ref 18–48)
MCH RBC QN AUTO: 27.7 PG (ref 27–31)
MCHC RBC AUTO-ENTMCNC: 32.3 G/DL (ref 32–36)
MCV RBC AUTO: 86 FL (ref 82–98)
MONOCYTES # BLD AUTO: 0.8 K/UL (ref 0.3–1)
MONOCYTES NFR BLD: 8.3 % (ref 4–15)
NEUTROPHILS # BLD AUTO: 6 K/UL (ref 1.8–7.7)
NEUTROPHILS NFR BLD: 65.2 % (ref 38–73)
NRBC BLD-RTO: 0 /100 WBC
PLATELET # BLD AUTO: 322 K/UL (ref 150–450)
PMV BLD AUTO: 10.3 FL (ref 9.2–12.9)
POTASSIUM SERPL-SCNC: 4 MMOL/L (ref 3.5–5.1)
PROT SERPL-MCNC: 8.7 G/DL (ref 6–8.4)
RBC # BLD AUTO: 4.23 M/UL (ref 4–5.4)
SARS-COV-2 RDRP RESP QL NAA+PROBE: NEGATIVE
SODIUM SERPL-SCNC: 134 MMOL/L (ref 136–145)
WBC # BLD AUTO: 9.12 K/UL (ref 3.9–12.7)

## 2022-10-17 PROCEDURE — 99285 EMERGENCY DEPT VISIT HI MDM: CPT | Mod: 25

## 2022-10-17 PROCEDURE — 93010 ELECTROCARDIOGRAM REPORT: CPT | Mod: ,,, | Performed by: INTERNAL MEDICINE

## 2022-10-17 PROCEDURE — 83036 HEMOGLOBIN GLYCOSYLATED A1C: CPT | Performed by: INTERNAL MEDICINE

## 2022-10-17 PROCEDURE — 25000003 PHARM REV CODE 250: Performed by: INTERNAL MEDICINE

## 2022-10-17 PROCEDURE — G0378 HOSPITAL OBSERVATION PER HR: HCPCS

## 2022-10-17 PROCEDURE — 93005 ELECTROCARDIOGRAM TRACING: CPT | Performed by: INTERNAL MEDICINE

## 2022-10-17 PROCEDURE — 36415 COLL VENOUS BLD VENIPUNCTURE: CPT | Performed by: INTERNAL MEDICINE

## 2022-10-17 PROCEDURE — 93010 EKG 12-LEAD: ICD-10-PCS | Mod: ,,, | Performed by: INTERNAL MEDICINE

## 2022-10-17 PROCEDURE — 80053 COMPREHEN METABOLIC PANEL: CPT | Performed by: NURSE PRACTITIONER

## 2022-10-17 PROCEDURE — 63600175 PHARM REV CODE 636 W HCPCS: Performed by: INTERNAL MEDICINE

## 2022-10-17 PROCEDURE — 85025 COMPLETE CBC W/AUTO DIFF WBC: CPT | Performed by: NURSE PRACTITIONER

## 2022-10-17 PROCEDURE — 96372 THER/PROPH/DIAG INJ SC/IM: CPT | Performed by: INTERNAL MEDICINE

## 2022-10-17 PROCEDURE — U0002 COVID-19 LAB TEST NON-CDC: HCPCS | Performed by: NURSE PRACTITIONER

## 2022-10-17 PROCEDURE — 96360 HYDRATION IV INFUSION INIT: CPT

## 2022-10-17 PROCEDURE — 96361 HYDRATE IV INFUSION ADD-ON: CPT

## 2022-10-17 RX ORDER — TALC
6 POWDER (GRAM) TOPICAL NIGHTLY PRN
Status: DISCONTINUED | OUTPATIENT
Start: 2022-10-17 | End: 2022-10-20 | Stop reason: HOSPADM

## 2022-10-17 RX ORDER — LANOLIN ALCOHOL/MO/W.PET/CERES
800 CREAM (GRAM) TOPICAL
Status: DISCONTINUED | OUTPATIENT
Start: 2022-10-17 | End: 2022-10-17

## 2022-10-17 RX ORDER — CETIRIZINE HYDROCHLORIDE 10 MG/1
10 TABLET ORAL EVERY OTHER DAY
Status: DISCONTINUED | OUTPATIENT
Start: 2022-10-18 | End: 2022-10-20 | Stop reason: HOSPADM

## 2022-10-17 RX ORDER — IBUPROFEN 200 MG
16 TABLET ORAL
Status: DISCONTINUED | OUTPATIENT
Start: 2022-10-17 | End: 2022-10-20 | Stop reason: HOSPADM

## 2022-10-17 RX ORDER — ASPIRIN 81 MG/1
81 TABLET ORAL DAILY
Status: DISCONTINUED | OUTPATIENT
Start: 2022-10-18 | End: 2022-10-20 | Stop reason: HOSPADM

## 2022-10-17 RX ORDER — AMLODIPINE BESYLATE 5 MG/1
10 TABLET ORAL DAILY
Status: DISCONTINUED | OUTPATIENT
Start: 2022-10-18 | End: 2022-10-20

## 2022-10-17 RX ORDER — ACETAMINOPHEN 325 MG/1
650 TABLET ORAL EVERY 8 HOURS PRN
Status: DISCONTINUED | OUTPATIENT
Start: 2022-10-17 | End: 2022-10-20 | Stop reason: HOSPADM

## 2022-10-17 RX ORDER — AZELASTINE 1 MG/ML
1 SPRAY, METERED NASAL 2 TIMES DAILY
Status: DISCONTINUED | OUTPATIENT
Start: 2022-10-17 | End: 2022-10-20 | Stop reason: HOSPADM

## 2022-10-17 RX ORDER — ONDANSETRON 2 MG/ML
4 INJECTION INTRAMUSCULAR; INTRAVENOUS EVERY 8 HOURS PRN
Status: DISCONTINUED | OUTPATIENT
Start: 2022-10-17 | End: 2022-10-20 | Stop reason: HOSPADM

## 2022-10-17 RX ORDER — GABAPENTIN 100 MG/1
100 CAPSULE ORAL 2 TIMES DAILY
Status: DISCONTINUED | OUTPATIENT
Start: 2022-10-17 | End: 2022-10-20 | Stop reason: HOSPADM

## 2022-10-17 RX ORDER — MINOXIDIL 10 MG/1
1 TABLET ORAL 2 TIMES DAILY
Status: ON HOLD | COMMUNITY
Start: 2022-09-28 | End: 2022-10-20 | Stop reason: HOSPADM

## 2022-10-17 RX ORDER — SODIUM CHLORIDE, SODIUM LACTATE, POTASSIUM CHLORIDE, CALCIUM CHLORIDE 600; 310; 30; 20 MG/100ML; MG/100ML; MG/100ML; MG/100ML
INJECTION, SOLUTION INTRAVENOUS CONTINUOUS
Status: DISCONTINUED | OUTPATIENT
Start: 2022-10-17 | End: 2022-10-19

## 2022-10-17 RX ORDER — GLUCAGON 1 MG
1 KIT INJECTION
Status: DISCONTINUED | OUTPATIENT
Start: 2022-10-17 | End: 2022-10-20 | Stop reason: HOSPADM

## 2022-10-17 RX ORDER — ENOXAPARIN SODIUM 100 MG/ML
30 INJECTION SUBCUTANEOUS EVERY 24 HOURS
Status: DISCONTINUED | OUTPATIENT
Start: 2022-10-17 | End: 2022-10-20 | Stop reason: HOSPADM

## 2022-10-17 RX ORDER — MINOXIDIL 2.5 MG/1
10 TABLET ORAL 2 TIMES DAILY
Status: DISCONTINUED | OUTPATIENT
Start: 2022-10-17 | End: 2022-10-20

## 2022-10-17 RX ORDER — INSULIN ASPART 100 [IU]/ML
1-10 INJECTION, SOLUTION INTRAVENOUS; SUBCUTANEOUS
Status: DISCONTINUED | OUTPATIENT
Start: 2022-10-17 | End: 2022-10-20 | Stop reason: HOSPADM

## 2022-10-17 RX ORDER — POLYETHYLENE GLYCOL 3350 17 G/17G
17 POWDER, FOR SOLUTION ORAL 2 TIMES DAILY PRN
Status: DISCONTINUED | OUTPATIENT
Start: 2022-10-17 | End: 2022-10-20 | Stop reason: HOSPADM

## 2022-10-17 RX ORDER — IBUPROFEN 200 MG
24 TABLET ORAL
Status: DISCONTINUED | OUTPATIENT
Start: 2022-10-17 | End: 2022-10-20 | Stop reason: HOSPADM

## 2022-10-17 RX ORDER — OXYCODONE HYDROCHLORIDE 5 MG/1
5 TABLET ORAL EVERY 6 HOURS PRN
Status: DISCONTINUED | OUTPATIENT
Start: 2022-10-17 | End: 2022-10-20 | Stop reason: HOSPADM

## 2022-10-17 RX ORDER — LOSARTAN POTASSIUM 50 MG/1
100 TABLET ORAL DAILY
Status: DISCONTINUED | OUTPATIENT
Start: 2022-10-18 | End: 2022-10-19

## 2022-10-17 RX ORDER — SODIUM,POTASSIUM PHOSPHATES 280-250MG
2 POWDER IN PACKET (EA) ORAL
Status: DISCONTINUED | OUTPATIENT
Start: 2022-10-17 | End: 2022-10-17

## 2022-10-17 RX ORDER — ATORVASTATIN CALCIUM 40 MG/1
80 TABLET, FILM COATED ORAL DAILY
Status: DISCONTINUED | OUTPATIENT
Start: 2022-10-18 | End: 2022-10-20 | Stop reason: HOSPADM

## 2022-10-17 RX ORDER — DOXYCYCLINE 100 MG/1
1 TABLET ORAL 2 TIMES DAILY
Status: DISCONTINUED | OUTPATIENT
Start: 2022-10-17 | End: 2022-10-20 | Stop reason: HOSPADM

## 2022-10-17 RX ORDER — FLUTICASONE PROPIONATE 50 MCG
2 SPRAY, SUSPENSION (ML) NASAL DAILY
Status: DISCONTINUED | OUTPATIENT
Start: 2022-10-18 | End: 2022-10-20 | Stop reason: HOSPADM

## 2022-10-17 RX ORDER — CALCIUM ACETATE 667 MG/1
667 CAPSULE ORAL
Status: DISCONTINUED | OUTPATIENT
Start: 2022-10-18 | End: 2022-10-20 | Stop reason: HOSPADM

## 2022-10-17 RX ORDER — AMOXICILLIN 250 MG
1 CAPSULE ORAL 2 TIMES DAILY
Status: DISCONTINUED | OUTPATIENT
Start: 2022-10-17 | End: 2022-10-20 | Stop reason: HOSPADM

## 2022-10-17 RX ORDER — CLOPIDOGREL BISULFATE 75 MG/1
75 TABLET ORAL DAILY
Status: DISCONTINUED | OUTPATIENT
Start: 2022-10-18 | End: 2022-10-20 | Stop reason: HOSPADM

## 2022-10-17 RX ADMIN — SENNOSIDES AND DOCUSATE SODIUM 1 TABLET: 50; 8.6 TABLET ORAL at 10:10

## 2022-10-17 RX ADMIN — ENOXAPARIN SODIUM 30 MG: 30 INJECTION SUBCUTANEOUS at 10:10

## 2022-10-17 RX ADMIN — AZELASTINE HYDROCHLORIDE 137 MCG: 137 SPRAY, METERED NASAL at 10:10

## 2022-10-17 RX ADMIN — SODIUM CHLORIDE, SODIUM LACTATE, POTASSIUM CHLORIDE, AND CALCIUM CHLORIDE: .6; .31; .03; .02 INJECTION, SOLUTION INTRAVENOUS at 10:10

## 2022-10-17 RX ADMIN — INSULIN DETEMIR 15 UNITS: 100 INJECTION, SOLUTION SUBCUTANEOUS at 10:10

## 2022-10-17 RX ADMIN — INSULIN ASPART 2 UNITS: 100 INJECTION, SOLUTION INTRAVENOUS; SUBCUTANEOUS at 10:10

## 2022-10-17 RX ADMIN — GABAPENTIN 100 MG: 100 CAPSULE ORAL at 10:10

## 2022-10-17 RX ADMIN — Medication 6 MG: at 10:10

## 2022-10-17 RX ADMIN — MINOXIDIL 10 MG: 2.5 TABLET ORAL at 10:10

## 2022-10-17 NOTE — Clinical Note
The site was marked. The groin was prepped. The site was prepped with Betadine. The site was clipped. The patient was draped. The patient was positioned supine. The patient was secured using safety straps.

## 2022-10-17 NOTE — Clinical Note
An angiography of the  left lower extremity was performed with the catheter and hand injected with 20 mL of contrast Multiple images. Contrast estimated.

## 2022-10-17 NOTE — FIRST PROVIDER EVALUATION
"Medical screening examination initiated.  I have conducted a focused provider triage encounter, findings are as follows:    Brief history of present illness:  Patient sent by Dr. Khoobehi for foot wound requiring admission. Family states she is scheduled to have a surgical procedure tomorrow    Vitals:    10/17/22 1619   BP: 131/60   BP Location: Right arm   Patient Position: Sitting   Pulse: 84   Resp: 18   Temp: 98.2 °F (36.8 °C)   TempSrc: Oral   SpO2: 97%   Weight: 83.9 kg (185 lb)   Height: 5' 9" (1.753 m)       Pertinent physical exam:  IN no distress. Foot in boot with wound covered.     Brief workup plan:  Start basic labs, blood cultures    Preliminary workup initiated; this workup will be continued and followed by the physician or advanced practice provider that is assigned to the patient when roomed.  "

## 2022-10-17 NOTE — Clinical Note
80 ml of contrast were injected throughout the case. 70 mL of contrast was the total wasted during the case. 150 mL was the total amount used during the case.

## 2022-10-18 LAB
ALBUMIN SERPL BCP-MCNC: 3.5 G/DL (ref 3.5–5.2)
ANION GAP SERPL CALC-SCNC: 14 MMOL/L (ref 8–16)
BUN SERPL-MCNC: 40 MG/DL (ref 6–20)
CALCIUM SERPL-MCNC: 9.2 MG/DL (ref 8.7–10.5)
CHLORIDE SERPL-SCNC: 93 MMOL/L (ref 95–110)
CO2 SERPL-SCNC: 27 MMOL/L (ref 23–29)
CREAT SERPL-MCNC: 5.7 MG/DL (ref 0.5–1.4)
ERYTHROCYTE [DISTWIDTH] IN BLOOD BY AUTOMATED COUNT: 13.9 % (ref 11.5–14.5)
EST. GFR  (NO RACE VARIABLE): 8.1 ML/MIN/1.73 M^2
ESTIMATED AVG GLUCOSE: 160 MG/DL (ref 68–131)
GLUCOSE SERPL-MCNC: 106 MG/DL (ref 70–110)
GLUCOSE SERPL-MCNC: 137 MG/DL (ref 70–110)
GLUCOSE SERPL-MCNC: 160 MG/DL (ref 70–110)
HBA1C MFR BLD: 7.2 % (ref 4.5–6.2)
HCT VFR BLD AUTO: 36 % (ref 37–48.5)
HGB BLD-MCNC: 11.5 G/DL (ref 12–16)
MCH RBC QN AUTO: 28.5 PG (ref 27–31)
MCHC RBC AUTO-ENTMCNC: 31.9 G/DL (ref 32–36)
MCV RBC AUTO: 89 FL (ref 82–98)
PHOSPHATE SERPL-MCNC: 6.7 MG/DL (ref 2.7–4.5)
PLATELET # BLD AUTO: 268 K/UL (ref 150–450)
PMV BLD AUTO: 10.7 FL (ref 9.2–12.9)
POTASSIUM SERPL-SCNC: 4 MMOL/L (ref 3.5–5.1)
RBC # BLD AUTO: 4.03 M/UL (ref 4–5.4)
SODIUM SERPL-SCNC: 134 MMOL/L (ref 136–145)
WBC # BLD AUTO: 6.63 K/UL (ref 3.9–12.7)

## 2022-10-18 PROCEDURE — 63600175 PHARM REV CODE 636 W HCPCS: Performed by: INTERNAL MEDICINE

## 2022-10-18 PROCEDURE — 75710 ARTERY X-RAYS ARM/LEG: CPT | Mod: XU,LT | Performed by: SURGERY

## 2022-10-18 PROCEDURE — 27201423 OPTIME MED/SURG SUP & DEVICES STERILE SUPPLY: Performed by: SURGERY

## 2022-10-18 PROCEDURE — C2623 CATH, TRANSLUMIN, DRUG-COAT: HCPCS | Performed by: SURGERY

## 2022-10-18 PROCEDURE — 25000003 PHARM REV CODE 250: Performed by: INTERNAL MEDICINE

## 2022-10-18 PROCEDURE — C1725 CATH, TRANSLUMIN NON-LASER: HCPCS | Performed by: SURGERY

## 2022-10-18 PROCEDURE — 85027 COMPLETE CBC AUTOMATED: CPT | Performed by: INTERNAL MEDICINE

## 2022-10-18 PROCEDURE — C1769 GUIDE WIRE: HCPCS | Performed by: SURGERY

## 2022-10-18 PROCEDURE — 99204 OFFICE O/P NEW MOD 45 MIN: CPT | Mod: 24,,, | Performed by: PODIATRIST

## 2022-10-18 PROCEDURE — 37224 HC FEM/POPL REVAS W/TLA: CPT | Mod: LT | Performed by: SURGERY

## 2022-10-18 PROCEDURE — 99204 PR OFFICE/OUTPT VISIT, NEW, LEVL IV, 45-59 MIN: ICD-10-PCS | Mod: 24,,, | Performed by: PODIATRIST

## 2022-10-18 PROCEDURE — C1760 CLOSURE DEV, VASC: HCPCS | Performed by: SURGERY

## 2022-10-18 PROCEDURE — 12000002 HC ACUTE/MED SURGE SEMI-PRIVATE ROOM

## 2022-10-18 PROCEDURE — C1887 CATHETER, GUIDING: HCPCS | Performed by: SURGERY

## 2022-10-18 PROCEDURE — 99152 MOD SED SAME PHYS/QHP 5/>YRS: CPT | Performed by: SURGERY

## 2022-10-18 PROCEDURE — 25000003 PHARM REV CODE 250: Performed by: SURGERY

## 2022-10-18 PROCEDURE — 63600175 PHARM REV CODE 636 W HCPCS: Performed by: SURGERY

## 2022-10-18 PROCEDURE — 36415 COLL VENOUS BLD VENIPUNCTURE: CPT | Performed by: INTERNAL MEDICINE

## 2022-10-18 PROCEDURE — C1894 INTRO/SHEATH, NON-LASER: HCPCS | Performed by: SURGERY

## 2022-10-18 PROCEDURE — 96361 HYDRATE IV INFUSION ADD-ON: CPT | Mod: 59

## 2022-10-18 PROCEDURE — 99153 MOD SED SAME PHYS/QHP EA: CPT | Performed by: SURGERY

## 2022-10-18 PROCEDURE — 25500020 PHARM REV CODE 255: Performed by: SURGERY

## 2022-10-18 PROCEDURE — 80069 RENAL FUNCTION PANEL: CPT | Performed by: INTERNAL MEDICINE

## 2022-10-18 DEVICE — IMPLANTABLE DEVICE: Type: IMPLANTABLE DEVICE | Site: GROIN | Status: FUNCTIONAL

## 2022-10-18 RX ORDER — MIDAZOLAM HYDROCHLORIDE 1 MG/ML
INJECTION INTRAMUSCULAR; INTRAVENOUS
Status: DISCONTINUED | OUTPATIENT
Start: 2022-10-18 | End: 2022-10-18 | Stop reason: HOSPADM

## 2022-10-18 RX ORDER — IODIXANOL 320 MG/ML
INJECTION, SOLUTION INTRAVASCULAR
Status: DISCONTINUED | OUTPATIENT
Start: 2022-10-18 | End: 2022-10-18 | Stop reason: HOSPADM

## 2022-10-18 RX ORDER — LIDOCAINE HYDROCHLORIDE 10 MG/ML
INJECTION, SOLUTION EPIDURAL; INFILTRATION; INTRACAUDAL; PERINEURAL
Status: DISCONTINUED | OUTPATIENT
Start: 2022-10-18 | End: 2022-10-18 | Stop reason: HOSPADM

## 2022-10-18 RX ORDER — FENTANYL CITRATE 50 UG/ML
INJECTION, SOLUTION INTRAMUSCULAR; INTRAVENOUS
Status: DISCONTINUED | OUTPATIENT
Start: 2022-10-18 | End: 2022-10-18 | Stop reason: HOSPADM

## 2022-10-18 RX ORDER — CLOPIDOGREL BISULFATE 75 MG/1
TABLET ORAL
Status: DISCONTINUED | OUTPATIENT
Start: 2022-10-18 | End: 2022-10-18 | Stop reason: HOSPADM

## 2022-10-18 RX ORDER — NAPROXEN SODIUM 220 MG/1
TABLET, FILM COATED ORAL
Status: DISCONTINUED | OUTPATIENT
Start: 2022-10-18 | End: 2022-10-18 | Stop reason: HOSPADM

## 2022-10-18 RX ORDER — HEPARIN SODIUM 1000 [USP'U]/ML
INJECTION, SOLUTION INTRAVENOUS; SUBCUTANEOUS
Status: DISCONTINUED | OUTPATIENT
Start: 2022-10-18 | End: 2022-10-18 | Stop reason: HOSPADM

## 2022-10-18 RX ADMIN — DOXYCYCLINE 100 MG: 100 TABLET, FILM COATED ORAL at 09:10

## 2022-10-18 RX ADMIN — MINOXIDIL 10 MG: 2.5 TABLET ORAL at 09:10

## 2022-10-18 RX ADMIN — GABAPENTIN 100 MG: 100 CAPSULE ORAL at 09:10

## 2022-10-18 RX ADMIN — Medication 6 MG: at 09:10

## 2022-10-18 RX ADMIN — SENNOSIDES AND DOCUSATE SODIUM 1 TABLET: 50; 8.6 TABLET ORAL at 09:10

## 2022-10-18 RX ADMIN — SODIUM CHLORIDE, SODIUM LACTATE, POTASSIUM CHLORIDE, AND CALCIUM CHLORIDE: .6; .31; .03; .02 INJECTION, SOLUTION INTRAVENOUS at 10:10

## 2022-10-18 RX ADMIN — ENOXAPARIN SODIUM 30 MG: 30 INJECTION SUBCUTANEOUS at 07:10

## 2022-10-18 RX ADMIN — AZELASTINE HYDROCHLORIDE 137 MCG: 137 SPRAY, METERED NASAL at 09:10

## 2022-10-18 RX ADMIN — INSULIN DETEMIR 15 UNITS: 100 INJECTION, SOLUTION SUBCUTANEOUS at 09:10

## 2022-10-18 NOTE — CONSULTS
[facility]  Vascular Surgery  Consult Note    Inpatient consult to Vascular Surgery  Consult performed by: Ali Khoobehi, MD  Consult ordered by: Woody Acosta MD      Subjective:     Chief Complaint/Reason for Admission: mike foot wounds    History of Present Illness:  Patient with history of diabetes, end-stage renal disease, av fistula revision was sent to the hospital by Dr. Bruno due to worsening wound in the left foot.  Patient had an angioplasty in the left leg back in June.  She has no other complaints.  Her arm access has been working well for dialysis.    Medications Prior to Admission   Medication Sig Dispense Refill Last Dose    amLODIPine (NORVASC) 10 MG tablet Take 1 tablet (10 mg total) by mouth once daily.   10/17/2022 at 06:00    aspirin (ECOTRIN) 81 MG EC tablet Take 81 mg by mouth once daily.   10/17/2022 at 06:00    atorvastatin (LIPITOR) 80 MG tablet Take 1 tablet (80 mg total) by mouth once daily. 90 tablet 3 10/17/2022 at 06:00    azelastine (ASTELIN) 137 mcg (0.1 %) nasal spray 1 spray (137 mcg total) by Nasal route 2 (two) times a day. 30 mL 2 10/17/2022 at 06:00    calcium acetate,phosphat bind, (PHOSLO) 667 mg capsule Take 1 capsule (667 mg total) by mouth 3 (three) times daily with meals.   10/17/2022 at 06:00    cetirizine (ZYRTEC) 10 MG tablet Take 1 tablet (10 mg total) by mouth every other day. 45 tablet 3 10/17/2022 at 06:00    clopidogreL (PLAVIX) 75 mg tablet Take 1 tablet (75 mg total) by mouth once daily. 90 tablet 3 10/17/2022 at 15:00    doxycycline (MONODOX) 100 MG capsule Take 1 capsule (100 mg total) by mouth 2 (two) times daily. for 14 days 28 capsule 0 10/17/2022 at 15:00    fluticasone propionate (FLONASE) 50 mcg/actuation nasal spray 2 sprays (100 mcg total) by Each Nostril route once daily. 16 g 3 10/17/2022 at 08:00    gabapentin (NEURONTIN) 100 MG capsule Take 1 capsule (100 mg total) by mouth 2 (two) times daily. 180 capsule 3 Past Week    insulin  "aspart U-100 (NOVOLOG FLEXPEN U-100 INSULIN) 100 unit/mL (3 mL) InPn pen Inject 5 Units into the skin 3 (three) times daily with meals. 3 mL 6 10/17/2022 at 14:00    insulin detemir U-100 (LEVEMIR FLEXTOUCH U-100 INSULN) 100 unit/mL (3 mL) InPn pen Inject 15 Units into the skin 2 (two) times daily. 27 mL 3 10/17/2022 at 14:00    losartan (COZAAR) 100 MG tablet Take 1 tablet (100 mg total) by mouth once daily.   10/17/2022 at 06:00    minoxidiL (LONITEN) 10 MG Tab Take 1 tablet by mouth 2 (two) times a day.   10/17/2022 at 06:00    ondansetron (ZOFRAN-ODT) 8 MG TbDL Take 1 tablet (8 mg total) by mouth every 8 (eight) hours as needed (nausea).   10/17/2022    oxyCODONE (ROXICODONE) 5 MG immediate release tablet Take 1 tablet (5 mg total) by mouth every 6 (six) hours as needed for Pain.   Past Month    blood sugar diagnostic Strp To check BG 4 times daily, to use with insurance preferred meter 450 strip 3     epoetin chris-epbx (RETACRIT) 4,000 unit/mL injection Inject 1.11 mLs (4,440 Units total) into the skin every Mon, Wed, Fri.   Unknown    lactulose (CHRONULAC) 10 gram/15 mL solution Take 20 g by mouth 2 (two) times a day.       pen needle, diabetic (BD ULTRA-FINE ROBERT PEN NEEDLE) 32 gauge x 5/32" Ndle To use 4 times per day with insulin injections. 450 each 2     polyethylene glycol (GLYCOLAX) 17 gram PwPk Take 17 g by mouth 2 (two) times daily as needed.   More than a month    senna-docusate 8.6-50 mg (PERICOLACE) 8.6-50 mg per tablet Take 1 tablet by mouth 2 (two) times daily.   More than a month       Review of patient's allergies indicates:   Allergen Reactions    Dilaudid [hydromorphone] Nausea And Vomiting    Chlorhexidine Itching    Lortab [hydrocodone-acetaminophen] Itching       Past Medical History:   Diagnosis Date    A-fib     resolved per patient    Anemia due to end stage renal disease 6/30/2022    Arthritis     Bronchitis     Cardiovascular event risk, ASCVD 10-year risk 6.7% 10/15/2016    Diabetes " mellitus type II     Diabetic foot infection     Diabetic ulcer of left midfoot 05/05/2022    Disorder of kidney and ureter     Encounter for blood transfusion     ESRD (end stage renal disease) 05/18/2018    MANJINDER (generalized anxiety disorder) 10/25/2016    History of colon polyps 11/02/2016    Hyperlipidemia     Hypertension     Stroke      Past Surgical History:   Procedure Laterality Date    AV FISTULA PLACEMENT Left 8/29/2022    Procedure: CREATION, AV FISTULA;  Surgeon: Ali Khoobehi, MD;  Location: [facility] OR;  Service: Vascular;  Laterality: Left;    BONE BIOPSY Left 8/24/2022    Procedure: Biopsy-Bone;  Surgeon: Porfirio Ponce DPM;  Location: [facility] OR;  Service: Podiatry;  Laterality: Left;    COLONOSCOPY N/A 10/5/2016    Procedure: COLONOSCOPY;  Surgeon: Pillo Chanel MD;  Location: [facility] ENDO;  Service: Endoscopy;  Laterality: N/A;    COLONOSCOPY N/A 4/26/2022    Procedure: COLONOSCOPY;  Surgeon: Saravanan Rachel MD;  Location: [facility] ENDO;  Service: Endoscopy;  Laterality: N/A;    FISTULOGRAM Left 8/24/2022    Procedure: Fistulogram;  Surgeon: Ali Khoobehi, MD;  Location: [facility] CATH/EP LAB;  Service: Vascular;  Laterality: Left;    HERNIA REPAIR Bilateral 11/22/2016    inguinal    HYSTERECTOMY      INCISION AND DRAINAGE FOOT Left 5/13/2022    Procedure: INCISION AND DRAINAGE, FOOT;  Surgeon: Ricardo Bruno DPM;  Location: [facility] OR;  Service: Podiatry;  Laterality: Left;    OOPHORECTOMY      THROMBECTOMY, AV FISTULA, UPPER EXTREMITY, PERCUTANEOUS  8/24/2022    Procedure: THROMBECTOMY, AV FISTULA, UPPER EXTREMITY, PERCUTANEOUS;  Surgeon: Ali Khoobehi, MD;  Location: [facility] CATH/EP LAB;  Service: Vascular;;    TOE AMPUTATION Left 8/26/2022    Procedure: AMPUTATION, TOE;  Surgeon: Porfirio Ponce DPM;  Location: [facility] OR;  Service: Podiatry;  Laterality: Left;     Family History       Problem Relation (Age of Onset)    Cancer Paternal Grandmother    Diabetes Father, Sister, Sister, Maternal Aunt, Maternal  Grandmother    Heart disease Maternal Grandmother    Hyperlipidemia Mother    Hypertension Mother, Father          Tobacco Use    Smoking status: Never    Smokeless tobacco: Never   Substance and Sexual Activity    Alcohol use: No    Drug use: No    Sexual activity: Yes     Partners: Male     Review of Systems   All other systems reviewed and are negative.  Objective:     Vital Signs (Most Recent):  Temp: 97.7 °F (36.5 °C) (10/18/22 0300)  Pulse: 67 (10/18/22 0300)  Resp: 18 (10/18/22 0300)  BP: (!) 146/76 (10/18/22 0300)  SpO2: 97 % (10/18/22 0300)   Vital Signs (24h Range):  Temp:  [97.7 °F (36.5 °C)-98.2 °F (36.8 °C)] 97.7 °F (36.5 °C)  Pulse:  [59-84] 67  Resp:  [16-18] 18  SpO2:  [97 %-99 %] 97 %  BP: (103-167)/(60-79) 146/76     Weight: 84.1 kg (185 lb 6.5 oz)  Body mass index is 27.38 kg/m².        Physical Exam  Constitutional:       Appearance: Normal appearance.   Cardiovascular:      Rate and Rhythm: Normal rate and regular rhythm.      Pulses:           Femoral pulses are 2+ on the right side and 2+ on the left side.     Heart sounds: Normal heart sounds.      Comments: Michael feet dressed  Left arm avf +thrill/bruit  Pulmonary:      Effort: Pulmonary effort is normal.      Breath sounds: Normal breath sounds.   Abdominal:      Palpations: Abdomen is soft.      Tenderness: There is no abdominal tenderness.   Neurological:      Mental Status: She is alert.       Significant Labs:  CBC:   Recent Labs   Lab 10/18/22  0605   WBC 6.63   RBC 4.03   HGB 11.5*   HCT 36.0*      MCV 89   MCH 28.5   MCHC 31.9*     CMP:   Recent Labs   Lab 10/17/22  1801 10/18/22  0605   * 106   CALCIUM 9.3 9.2   ALBUMIN 4.2 3.5   PROT 8.7*  --    * 134*   K 4.0 4.0   CO2 35* 27   CL 90* 93*   BUN 33* 40*   CREATININE 4.8* 5.7*   ALKPHOS 83  --    ALT 28  --    AST 24  --    BILITOT 0.7  --      Coagulation: No results for input(s): LABPROT, INR, APTT in the last 48 hours.    Significant Diagnostics:  I have  reviewed all pertinent imaging results/findings within the past 24 hours.    Assessment/Plan:   Patient with possible recurrent stenosis in the left leg, will need an angiogram for further evaluation.  Patient is agreeable for this study.      Active Diagnoses:    Diagnosis Date Noted POA    PRINCIPAL PROBLEM:  PAD (peripheral artery disease) [I73.9] 06/27/2022 Yes    Diabetic ulcer of left midfoot associated with type 2 diabetes mellitus [E11.621, L97.429]  Yes    Type 2 diabetes mellitus with kidney complication, with long-term current use of insulin [E11.29, Z79.4] 06/25/2020 Not Applicable    ESRD (end stage renal disease) [N18.6] 05/18/2018 Yes    Hypertension associated with diabetes [E11.59, I15.2] 06/13/2013 Yes     Chronic      Problems Resolved During this Admission:       Thank you for your consult. I will follow-up with patient. Please contact us if you have any additional questions.    Ali Khoobehi, MD  Vascular Surgery  Critical access hospital

## 2022-10-18 NOTE — ASSESSMENT & PLAN NOTE
Patient's FSGs are controlled on current medication regimen.  Last A1c reviewed-   Lab Results   Component Value Date    LABA1C 10.3 (H) 09/11/2015    HGBA1C 7.5 (H) 06/22/2022     Most recent fingerstick glucose reviewed- No results for input(s): POCTGLUCOSE in the last 24 hours.  Current correctional scale  Medium  Maintain anti-hyperglycemic dose as follows-     Inpatient admission to med/tele; continue home regimen for DM, ESRD, HTN, HLD and PAF; Nephrology for RRT; Vascular consult; Podiatry consult; Wound care consult; Insulin sliding scale  Antihyperglycemics (From admission, onward)    None        Hold Oral hypoglycemics while patient is in the hospital.

## 2022-10-18 NOTE — SUBJECTIVE & OBJECTIVE
Past Medical History:   Diagnosis Date    A-fib     resolved per patient    Anemia due to end stage renal disease 6/30/2022    Arthritis     Bronchitis     Cardiovascular event risk, ASCVD 10-year risk 6.7% 10/15/2016    Diabetes mellitus type II     Diabetic foot infection     Diabetic ulcer of left midfoot 05/05/2022    Disorder of kidney and ureter     Encounter for blood transfusion     ESRD (end stage renal disease) 05/18/2018    MANJINDER (generalized anxiety disorder) 10/25/2016    History of colon polyps 11/02/2016    Hyperlipidemia     Hypertension     Stroke        Past Surgical History:   Procedure Laterality Date    AV FISTULA PLACEMENT Left 8/29/2022    Procedure: CREATION, AV FISTULA;  Surgeon: Ali Khoobehi, MD;  Location: [facility] OR;  Service: Vascular;  Laterality: Left;    BONE BIOPSY Left 8/24/2022    Procedure: Biopsy-Bone;  Surgeon: Porfirio Ponce DPM;  Location: [facility] OR;  Service: Podiatry;  Laterality: Left;    COLONOSCOPY N/A 10/5/2016    Procedure: COLONOSCOPY;  Surgeon: Pillo Chanel MD;  Location: [facility] ENDO;  Service: Endoscopy;  Laterality: N/A;    COLONOSCOPY N/A 4/26/2022    Procedure: COLONOSCOPY;  Surgeon: Saravanan Rachel MD;  Location: [facility] ENDO;  Service: Endoscopy;  Laterality: N/A;    FISTULOGRAM Left 8/24/2022    Procedure: Fistulogram;  Surgeon: Ali Khoobehi, MD;  Location: [facility] CATH/EP LAB;  Service: Vascular;  Laterality: Left;    HERNIA REPAIR Bilateral 11/22/2016    inguinal    HYSTERECTOMY      INCISION AND DRAINAGE FOOT Left 5/13/2022    Procedure: INCISION AND DRAINAGE, FOOT;  Surgeon: Ricardo Bruno DPM;  Location: [facility] OR;  Service: Podiatry;  Laterality: Left;    OOPHORECTOMY      THROMBECTOMY, AV FISTULA, UPPER EXTREMITY, PERCUTANEOUS  8/24/2022    Procedure: THROMBECTOMY, AV FISTULA, UPPER EXTREMITY, PERCUTANEOUS;  Surgeon: Ali Khoobehi, MD;  Location: [facility] CATH/EP LAB;  Service: Vascular;;    TOE AMPUTATION Left 8/26/2022    Procedure: AMPUTATION, TOE;  Surgeon:  Porfirio Ponce DPM;  Location: [facility];  Service: Podiatry;  Laterality: Left;       Review of patient's allergies indicates:   Allergen Reactions    Dilaudid [hydromorphone] Nausea And Vomiting    Chlorhexidine Itching    Lortab [hydrocodone-acetaminophen] Itching       No current facility-administered medications on file prior to encounter.     Current Outpatient Medications on File Prior to Encounter   Medication Sig    amLODIPine (NORVASC) 10 MG tablet Take 1 tablet (10 mg total) by mouth once daily.    aspirin (ECOTRIN) 81 MG EC tablet Take 81 mg by mouth once daily.    atorvastatin (LIPITOR) 80 MG tablet Take 1 tablet (80 mg total) by mouth once daily.    azelastine (ASTELIN) 137 mcg (0.1 %) nasal spray 1 spray (137 mcg total) by Nasal route 2 (two) times a day.    calcium acetate,phosphat bind, (PHOSLO) 667 mg capsule Take 1 capsule (667 mg total) by mouth 3 (three) times daily with meals.    cetirizine (ZYRTEC) 10 MG tablet Take 1 tablet (10 mg total) by mouth every other day.    clopidogreL (PLAVIX) 75 mg tablet Take 1 tablet (75 mg total) by mouth once daily.    doxycycline (MONODOX) 100 MG capsule Take 1 capsule (100 mg total) by mouth 2 (two) times daily. for 14 days    fluticasone propionate (FLONASE) 50 mcg/actuation nasal spray 2 sprays (100 mcg total) by Each Nostril route once daily.    gabapentin (NEURONTIN) 100 MG capsule Take 1 capsule (100 mg total) by mouth 2 (two) times daily.    insulin aspart U-100 (NOVOLOG FLEXPEN U-100 INSULIN) 100 unit/mL (3 mL) InPn pen Inject 5 Units into the skin 3 (three) times daily with meals.    insulin detemir U-100 (LEVEMIR FLEXTOUCH U-100 INSULN) 100 unit/mL (3 mL) InPn pen Inject 15 Units into the skin 2 (two) times daily.    losartan (COZAAR) 100 MG tablet Take 1 tablet (100 mg total) by mouth once daily.    minoxidiL (LONITEN) 10 MG Tab Take 1 tablet by mouth 2 (two) times a day.    ondansetron (ZOFRAN-ODT) 8 MG TbDL Take 1 tablet (8 mg total) by mouth  "every 8 (eight) hours as needed (nausea).    oxyCODONE (ROXICODONE) 5 MG immediate release tablet Take 1 tablet (5 mg total) by mouth every 6 (six) hours as needed for Pain.    blood sugar diagnostic Strp To check BG 4 times daily, to use with insurance preferred meter    epoetin chris-epbx (RETACRIT) 4,000 unit/mL injection Inject 1.11 mLs (4,440 Units total) into the skin every Mon, Wed, Fri.    hydrALAZINE (APRESOLINE) 50 MG tablet Take 1 tablet (50 mg total) by mouth every 12 (twelve) hours.    lactulose (CHRONULAC) 10 gram/15 mL solution Take 20 g by mouth 2 (two) times a day.    oxybutynin (DITROPAN) 5 MG Tab Take 5 mg by mouth.    pen needle, diabetic (BD ULTRA-FINE ROBERT PEN NEEDLE) 32 gauge x 5/32" Ndle To use 4 times per day with insulin injections.    polyethylene glycol (GLYCOLAX) 17 gram PwPk Take 17 g by mouth 2 (two) times daily as needed.    senna-docusate 8.6-50 mg (PERICOLACE) 8.6-50 mg per tablet Take 1 tablet by mouth 2 (two) times daily.    [DISCONTINUED] blood-glucose meter (ACCU-CHEK KAYLIE PLUS METER) Misc To use to check blood sugars 4 times a day    [DISCONTINUED] clorazepate (TRANXENE) 3.75 MG Tab Take 7.5 mg by mouth nightly as needed.     [DISCONTINUED] dextrose (GLUCOSE GEL) 40 % gel Take 37.5 mLs (15,000 mg total) by mouth once as needed (hypoglycemia).    [DISCONTINUED] ezetimibe (ZETIA) 10 mg tablet Take 1 tablet (10 mg total) by mouth once daily.    [DISCONTINUED] fenofibrate 160 MG Tab Take 1 tablet (160 mg total) by mouth once daily.    [DISCONTINUED] lancing device with lancets (ACCU-CHEK SOFT DEV LANCETS) Kit To check blood sugars 4 times per day    [DISCONTINUED] pantoprazole (PROTONIX) 40 MG tablet Take 1 tablet (40 mg total) by mouth once daily.     Family History       Problem Relation (Age of Onset)    Cancer Paternal Grandmother    Diabetes Father, Sister, Sister, Maternal Aunt, Maternal Grandmother    Heart disease Maternal Grandmother    Hyperlipidemia Mother    " Hypertension Mother, Father          Tobacco Use    Smoking status: Never    Smokeless tobacco: Never   Substance and Sexual Activity    Alcohol use: No    Drug use: No    Sexual activity: Yes     Partners: Male     Review of Systems   Constitutional: Negative.    HENT: Negative.     Eyes: Negative.    Respiratory: Negative.     Cardiovascular: Negative.    Gastrointestinal: Negative.    Endocrine: Negative.    Genitourinary: Negative.    Musculoskeletal: Negative.    Skin: Negative.         Left foot not healing   Allergic/Immunologic: Negative.    Neurological: Negative.    Hematological: Negative.    All other systems reviewed and are negative.  Objective:     Vital Signs (Most Recent):  Temp: 98.2 °F (36.8 °C) (10/17/22 1619)  Pulse: 79 (10/17/22 2000)  Resp: 18 (10/17/22 1619)  BP: (!) 167/77 (10/17/22 2000)  SpO2: 97 % (10/17/22 1619)   Vital Signs (24h Range):  Temp:  [98.2 °F (36.8 °C)] 98.2 °F (36.8 °C)  Pulse:  [79-84] 79  Resp:  [18] 18  SpO2:  [97 %] 97 %  BP: (131-167)/(60-77) 167/77     Weight: 83.9 kg (185 lb)  Body mass index is 27.32 kg/m².    Physical Exam  Vitals and nursing note reviewed.   Constitutional:       Appearance: She is well-developed.   HENT:      Head: Normocephalic and atraumatic.      Right Ear: External ear normal.      Left Ear: External ear normal.      Nose: Nose normal.   Eyes:      Conjunctiva/sclera: Conjunctivae normal.      Pupils: Pupils are equal, round, and reactive to light.   Cardiovascular:      Rate and Rhythm: Normal rate and regular rhythm.      Heart sounds: Normal heart sounds.   Pulmonary:      Effort: Pulmonary effort is normal.      Breath sounds: Normal breath sounds.   Abdominal:      General: Bowel sounds are normal.      Palpations: Abdomen is soft.   Musculoskeletal:         General: Normal range of motion.      Cervical back: Normal range of motion and neck supple.      Comments: Left foot dressing left in place; dry   Skin:     General: Skin is warm  and dry.      Capillary Refill: Capillary refill takes less than 2 seconds.   Neurological:      Mental Status: She is alert and oriented to person, place, and time.   Psychiatric:         Behavior: Behavior normal.         Thought Content: Thought content normal.         Judgment: Judgment normal.         CRANIAL NERVES     CN III, IV, VI   Pupils are equal, round, and reactive to light.     Significant Labs: All pertinent labs within the past 24 hours have been reviewed.  CBC:   Recent Labs   Lab 10/17/22  1801   WBC 9.12   HGB 11.7*   HCT 36.2*        CMP:   Recent Labs   Lab 10/17/22  1801   *   K 4.0   CL 90*   CO2 35*   *   BUN 33*   CREATININE 4.8*   CALCIUM 9.3   PROT 8.7*   ALBUMIN 4.2   BILITOT 0.7   ALKPHOS 83   AST 24   ALT 28   ANIONGAP 9     Cardiac Markers: No results for input(s): CKMB, MYOGLOBIN, BNP, TROPISTAT in the last 48 hours.    Significant Imaging: I have reviewed all pertinent imaging results/findings within the past 24 hours.

## 2022-10-18 NOTE — ASSESSMENT & PLAN NOTE
Patient's FSGs are controlled on current medication regimen.  Last A1c reviewed-   Lab Results   Component Value Date    LABA1C 10.3 (H) 09/11/2015    HGBA1C 7.5 (H) 06/22/2022     Most recent fingerstick glucose reviewed- No results for input(s): POCTGLUCOSE in the last 24 hours.  Current correctional scale  Medium  Maintain anti-hyperglycemic dose as follows-   Antihyperglycemics (From admission, onward)    None        Hold Oral hypoglycemics while patient is in the hospital.      Inpatient admission to med/tele; continue home regimen for DM, ESRD, HTN, HLD and PAF; Nephrology for RRT; Vascular consult; Podiatry consult; Wound care consult; Insulin sliding scale

## 2022-10-18 NOTE — NURSING TRANSFER
Nursing Transfer Note      10/18/2022     Reason patient is being transferred: [facility] level of care    Transfer : heart center to 1122    Transfer via stretcher    Transfer with cardiac monitor    Transported by [facility]    Medicines sent: none    Any special needs or follow-up needed: wound vac in place    Chart send with patient: Yes    Notified: report given to primary nurse and spouse    Patient reassessed at:  (10/18/2022 1200)    Upon arrival to floor: pt call light in reach and  at bedside. Nurse from floor in room for transfer assistance.

## 2022-10-18 NOTE — CONSULTS
Patient is in procedure.  Nurse states patient has own wound vac.  She will remove for MD to see wound.  We can replace with our wound vac tomorrow.

## 2022-10-18 NOTE — OP NOTE
Count includes the [facility]  Vascular Surgery  Operative Note    SUMMARY     Date of Procedure: 10/18/2022     Procedure: Procedure(s) (LRB):  Angiogram Extremity Unilateral (Left)   Apl left above knee popliteal artery    Surgeon(s) and Role:     * Ali Khoobehi, MD - Primary    Assisting Surgeon: None    Pre-Operative Diagnosis: Peripheral vascular disease, unspecified [I73.9]    Post-Operative Diagnosis: Post-Op Diagnosis Codes:     * Peripheral vascular disease, unspecified [I73.9]    Anesthesia: RN IV Sedation    Operative Findings (including complications, if any):  Single-vessel runoff to left foot via a robust anterior tibial artery, moderate pedal vessel disease    Description of Technical Procedures:   Indications, risks, and benefits of left leg angiogram, possible angioplasty were discussed with the patient. Risks discussed included possible bleeding, infection, cardiac arrest, limb loss, renal failure, nerve injury, stroke, and death, among other risks. Patient was agreeable for the procedure and signed consent.    Patient was brought to the procedure room and placed under sedation. Under my direct supervision, moderate sedation was administered with an initial dose of Versed (2 mg) and Fentanyl (150 mcgs). These were readministered during the course of the case. An independent observer was present throughout the procedure, there was continuous monitoring of cardiac telemetry, hemodynamics and pulse oximetry. I was personally present and spent 30 minutes face to face time during sedation administration.    Using ultrasound guidance access was obtained at the right femoral artery with an introducer needle. 5F sheath was placed. Angiogram confirmed adequate placement at the common femoral artery.    Aortogram showed no significant aortoiliac occlusive disease, and mild diffuse disease only. Rim catheter was used to traverse the left iliac system. Angiogram of the left leg was performed which shows patent  femoral vessels.  There is focal severe stenosis in the above knee popliteal artery of about 80%.  There is single-vessel runoff to the left foot via a robust anterior tibial artery.  There is moderate pedal vessel disease.    I cross the popliteal artery stenosis and treated this area with a 4 mm AngioSculpt balloon.  Following this I treated it with a 4 mm drug-eluting balloon, with good result.  There is resolution of the stenosis.    Completion angiogram demonstrates a patent SFA with three vessel runoff to the left foot.    Right groin sheath was removed and pressure held for 20 minutes. Patient left the procedure room in stable condition.    Significant Surgical Tasks Conducted by the Assistant(s), if Applicable: n/a    Estimated Blood Loss (EBL): * No values recorded between 10/18/2022  8:07 AM and 10/18/2022  9:04 AM *           Implants:   Implant Name Type Inv. Item Serial No.  Lot No. LRB No. Used Action   DEVICE CLOSURE VASCADE 6/7FR - [id number] Closure Device Angio DEVICE CLOSURE VASCADE 6/7FR    Left 1 Implanted       Specimens:   Specimen (24h ago, onward)      None                    Condition: Good    Disposition: PACU - hemodynamically stable.    Attestation: I performed the procedure.

## 2022-10-18 NOTE — SUBJECTIVE & OBJECTIVE
Scheduled Meds:   amLODIPine  10 mg Oral Daily    aspirin  81 mg Oral Daily    atorvastatin  80 mg Oral Daily    azelastine  1 spray Nasal BID    calcium acetate(phosphat bind)  667 mg Oral TID WM    cetirizine  10 mg Oral Every other day    clopidogreL  75 mg Oral Daily    doxycycline monohydrate  1 tablet Oral BID    enoxaparin  30 mg Subcutaneous Daily    [START ON 10/19/2022] epoetin chris-epbx  50 Units/kg Subcutaneous Every Mon, Wed, Fri    fluticasone propionate  2 spray Each Nostril Daily    gabapentin  100 mg Oral BID    insulin detemir U-100  15 Units Subcutaneous BID    losartan  100 mg Oral Daily    minoxidiL  10 mg Oral BID    senna-docusate 8.6-50 mg  1 tablet Oral BID     Continuous Infusions:   lactated ringers 100 mL/hr at 10/17/22 2232     PRN Meds:acetaminophen, aspirin, clopidogreL, dextrose 10%, dextrose 10%, fentaNYL, glucagon (human recombinant), glucose, glucose, heparin (porcine), insulin aspart U-100, iodixanoL, LIDOcaine (PF) 10 mg/ml (1%), melatonin, midazolam, ondansetron, oxyCODONE, polyethylene glycol    Review of patient's allergies indicates:   Allergen Reactions    Dilaudid [hydromorphone] Nausea And Vomiting    Chlorhexidine Itching    Lortab [hydrocodone-acetaminophen] Itching        Past Medical History:   Diagnosis Date    A-fib     resolved per patient    Anemia due to end stage renal disease 6/30/2022    Arthritis     Bronchitis     Cardiovascular event risk, ASCVD 10-year risk 6.7% 10/15/2016    Diabetes mellitus type II     Diabetic foot infection     Diabetic ulcer of left midfoot 05/05/2022    Disorder of kidney and ureter     Encounter for blood transfusion     ESRD (end stage renal disease) 05/18/2018    MANJINDER (generalized anxiety disorder) 10/25/2016    History of colon polyps 11/02/2016    Hyperlipidemia     Hypertension     Stroke      Past Surgical History:   Procedure Laterality Date    AV FISTULA PLACEMENT Left 8/29/2022    Procedure: CREATION, AV FISTULA;  Surgeon:  Ali Khoobehi, MD;  Location: [facility] OR;  Service: Vascular;  Laterality: Left;    BONE BIOPSY Left 8/24/2022    Procedure: Biopsy-Bone;  Surgeon: Porfirio Ponce DPM;  Location: [facility] OR;  Service: Podiatry;  Laterality: Left;    COLONOSCOPY N/A 10/5/2016    Procedure: COLONOSCOPY;  Surgeon: Pillo Chanel MD;  Location: [facility] ENDO;  Service: Endoscopy;  Laterality: N/A;    COLONOSCOPY N/A 4/26/2022    Procedure: COLONOSCOPY;  Surgeon: Saravanan Rachel MD;  Location: [facility] ENDO;  Service: Endoscopy;  Laterality: N/A;    FISTULOGRAM Left 8/24/2022    Procedure: Fistulogram;  Surgeon: Ali Khoobehi, MD;  Location: [facility] CATH/EP LAB;  Service: Vascular;  Laterality: Left;    HERNIA REPAIR Bilateral 11/22/2016    inguinal    HYSTERECTOMY      INCISION AND DRAINAGE FOOT Left 5/13/2022    Procedure: INCISION AND DRAINAGE, FOOT;  Surgeon: Ricardo Bruno DPM;  Location: [facility] OR;  Service: Podiatry;  Laterality: Left;    OOPHORECTOMY      THROMBECTOMY, AV FISTULA, UPPER EXTREMITY, PERCUTANEOUS  8/24/2022    Procedure: THROMBECTOMY, AV FISTULA, UPPER EXTREMITY, PERCUTANEOUS;  Surgeon: Ali Khoobehi, MD;  Location: [facility] CATH/EP LAB;  Service: Vascular;;    TOE AMPUTATION Left 8/26/2022    Procedure: AMPUTATION, TOE;  Surgeon: Porfirio Ponce DPM;  Location: [facility] OR;  Service: Podiatry;  Laterality: Left;       Family History       Problem Relation (Age of Onset)    Cancer Paternal Grandmother    Diabetes Father, Sister, Sister, Maternal Aunt, Maternal Grandmother    Heart disease Maternal Grandmother    Hyperlipidemia Mother    Hypertension Mother, Father          Tobacco Use    Smoking status: Never    Smokeless tobacco: Never   Substance and Sexual Activity    Alcohol use: No    Drug use: No    Sexual activity: Yes     Partners: Male     Review of Systems  Objective:     Vital Signs (Most Recent):  Temp: 97.7 °F (36.5 °C) (10/18/22 0300)  Pulse: 63 (10/18/22 0916)  Resp: 18 (10/18/22 0300)  BP: (!) 118/58 (10/18/22  0916)  SpO2: 95 % (10/18/22 0916)   Vital Signs (24h Range):  Temp:  [97.7 °F (36.5 °C)-98.2 °F (36.8 °C)] 97.7 °F (36.5 °C)  Pulse:  [59-84] 63  Resp:  [16-18] 18  SpO2:  [95 %-99 %] 95 %  BP: (103-167)/(58-79) 118/58     Weight: 84.1 kg (185 lb 6.5 oz)  Body mass index is 27.38 kg/m².    Foot Exam    Right Foot/Ankle     Neurovascular  Dorsalis pedis: absent  Posterior tibial: absent  Saphenous nerve sensation: absent  Tibial nerve sensation: absent  Superficial peroneal nerve sensation: absent  Deep peroneal nerve sensation: absent  Sural nerve sensation: absent      Left Foot/Ankle      Neurovascular  Dorsalis pedis: absent  Posterior tibial: absent  Saphenous nerve sensation: absent  Tibial nerve sensation: absent  Superficial peroneal nerve sensation: absent  Deep peroneal nerve sensation: absent  Sural nerve sensation: absent      Wound VAC in place did not disturb dressing.        Laboratory:  All pertinent labs reviewed within the last 24 hours.    Diagnostic Results:  I have reviewed all pertinent imaging results/findings within the past 24 hours.

## 2022-10-18 NOTE — ASSESSMENT & PLAN NOTE
Inpatient admission to med/tele; continue home regimen for DM, ESRD, HTN, HLD and PAF; Nephrology for RRT; Vascular consult; Podiatry consult; Wound care consult; Insulin sliding scale

## 2022-10-18 NOTE — H&P
[facility] - Emergency Dept  Hospital Medicine  History & Physical    Patient Name: Leanna García  MRN: [id number]  Patient Class: OP- Observation  Admission Date: 10/17/2022  Attending Physician: Woody Acosta MD  Primary Care Provider: Stefano Lopez MD         Patient information was obtained from patient, spouse/SO, past medical records, ER records and ED MD.     Subjective:     Principal Problem:PAD (peripheral artery disease)    Chief Complaint:   Chief Complaint   Patient presents with    Wound Check     Left foot - cultured last Tuesday that came back positive and sent by Dr. Khoobehi        HPI: 57-year-old female with history of DM 2; ESRD on RRT; PAF, HLD, CAD, HTN, CVA, Non-healing DM foot ulcer was sent to ED for admission for angiography of lower extremities with possible surgical intervention for non-healing ulcer. She was recently in hospital for same. She has had osteomyelitis (bone biopsy grew Proteus) with eventual amputation of the 1st toe and partial 1st metatarsal head amputation on 8/26. This was followed by 2 weeks of cefepime as outpatient. Patient was discharged on clopidogrel, but reportedly did not start until a fortnight or so post discharge when she saw Podiatry for follow-up. She has since begun antiplatelet therapy. However, wound is not healing. Is for angiogram in AM with possible intervention pending angiography results. Denied chest discomfort. No SOB.     In ED: Labs reviewed: no leukocytosis with minimal normocytic anemia; trivial hyponatremia with end stage renal dysfunction. COVID negative. CXR reviewed: NAPD. EKG reviewed: sinus, left axis, no acute segments.    Discussed with ED MD: Inpatient admission to med/tele; continue home regimen for DM, ESRD, HTN, HLD and PAF; Nephrology for RRT; Vascular consult; Podiatry consult; Wound care consult; Insulin sliding scale      Past Medical History:   Diagnosis Date    A-fib     resolved per patient    Anemia  due to end stage renal disease 6/30/2022    Arthritis     Bronchitis     Cardiovascular event risk, ASCVD 10-year risk 6.7% 10/15/2016    Diabetes mellitus type II     Diabetic foot infection     Diabetic ulcer of left midfoot 05/05/2022    Disorder of kidney and ureter     Encounter for blood transfusion     ESRD (end stage renal disease) 05/18/2018    MANJINDER (generalized anxiety disorder) 10/25/2016    History of colon polyps 11/02/2016    Hyperlipidemia     Hypertension     Stroke        Past Surgical History:   Procedure Laterality Date    AV FISTULA PLACEMENT Left 8/29/2022    Procedure: CREATION, AV FISTULA;  Surgeon: Ali Khoobehi, MD;  Location: [facility] OR;  Service: Vascular;  Laterality: Left;    BONE BIOPSY Left 8/24/2022    Procedure: Biopsy-Bone;  Surgeon: Porfirio Ponce DPM;  Location: [facility] OR;  Service: Podiatry;  Laterality: Left;    COLONOSCOPY N/A 10/5/2016    Procedure: COLONOSCOPY;  Surgeon: Pillo Chanel MD;  Location: [facility] ENDO;  Service: Endoscopy;  Laterality: N/A;    COLONOSCOPY N/A 4/26/2022    Procedure: COLONOSCOPY;  Surgeon: Saravanan Rachel MD;  Location: [facility] ENDO;  Service: Endoscopy;  Laterality: N/A;    FISTULOGRAM Left 8/24/2022    Procedure: Fistulogram;  Surgeon: Ali Khoobehi, MD;  Location: [facility] CATH/EP LAB;  Service: Vascular;  Laterality: Left;    HERNIA REPAIR Bilateral 11/22/2016    inguinal    HYSTERECTOMY      INCISION AND DRAINAGE FOOT Left 5/13/2022    Procedure: INCISION AND DRAINAGE, FOOT;  Surgeon: Ricardo Bruno DPM;  Location: [facility] OR;  Service: Podiatry;  Laterality: Left;    OOPHORECTOMY      THROMBECTOMY, AV FISTULA, UPPER EXTREMITY, PERCUTANEOUS  8/24/2022    Procedure: THROMBECTOMY, AV FISTULA, UPPER EXTREMITY, PERCUTANEOUS;  Surgeon: Ali Khoobehi, MD;  Location: [facility] CATH/EP LAB;  Service: Vascular;;    TOE AMPUTATION Left 8/26/2022    Procedure: AMPUTATION, TOE;  Surgeon: Porfirio Ponce DPM;  Location: [facility] OR;  Service:  Podiatry;  Laterality: Left;       Review of patient's allergies indicates:   Allergen Reactions    Dilaudid [hydromorphone] Nausea And Vomiting    Chlorhexidine Itching    Lortab [hydrocodone-acetaminophen] Itching       No current facility-administered medications on file prior to encounter.     Current Outpatient Medications on File Prior to Encounter   Medication Sig    amLODIPine (NORVASC) 10 MG tablet Take 1 tablet (10 mg total) by mouth once daily.    aspirin (ECOTRIN) 81 MG EC tablet Take 81 mg by mouth once daily.    atorvastatin (LIPITOR) 80 MG tablet Take 1 tablet (80 mg total) by mouth once daily.    azelastine (ASTELIN) 137 mcg (0.1 %) nasal spray 1 spray (137 mcg total) by Nasal route 2 (two) times a day.    calcium acetate,phosphat bind, (PHOSLO) 667 mg capsule Take 1 capsule (667 mg total) by mouth 3 (three) times daily with meals.    cetirizine (ZYRTEC) 10 MG tablet Take 1 tablet (10 mg total) by mouth every other day.    clopidogreL (PLAVIX) 75 mg tablet Take 1 tablet (75 mg total) by mouth once daily.    doxycycline (MONODOX) 100 MG capsule Take 1 capsule (100 mg total) by mouth 2 (two) times daily. for 14 days    fluticasone propionate (FLONASE) 50 mcg/actuation nasal spray 2 sprays (100 mcg total) by Each Nostril route once daily.    gabapentin (NEURONTIN) 100 MG capsule Take 1 capsule (100 mg total) by mouth 2 (two) times daily.    insulin aspart U-100 (NOVOLOG FLEXPEN U-100 INSULIN) 100 unit/mL (3 mL) InPn pen Inject 5 Units into the skin 3 (three) times daily with meals.    insulin detemir U-100 (LEVEMIR FLEXTOUCH U-100 INSULN) 100 unit/mL (3 mL) InPn pen Inject 15 Units into the skin 2 (two) times daily.    losartan (COZAAR) 100 MG tablet Take 1 tablet (100 mg total) by mouth once daily.    minoxidiL (LONITEN) 10 MG Tab Take 1 tablet by mouth 2 (two) times a day.    ondansetron (ZOFRAN-ODT) 8 MG TbDL Take 1 tablet (8 mg total) by mouth every 8 (eight) hours as needed  "(nausea).    oxyCODONE (ROXICODONE) 5 MG immediate release tablet Take 1 tablet (5 mg total) by mouth every 6 (six) hours as needed for Pain.    blood sugar diagnostic Strp To check BG 4 times daily, to use with insurance preferred meter    epoetin chris-epbx (RETACRIT) 4,000 unit/mL injection Inject 1.11 mLs (4,440 Units total) into the skin every Mon, Wed, Fri.    hydrALAZINE (APRESOLINE) 50 MG tablet Take 1 tablet (50 mg total) by mouth every 12 (twelve) hours.    lactulose (CHRONULAC) 10 gram/15 mL solution Take 20 g by mouth 2 (two) times a day.    oxybutynin (DITROPAN) 5 MG Tab Take 5 mg by mouth.    pen needle, diabetic (BD ULTRA-FINE ROBERT PEN NEEDLE) 32 gauge x 5/32" Ndle To use 4 times per day with insulin injections.    polyethylene glycol (GLYCOLAX) 17 gram PwPk Take 17 g by mouth 2 (two) times daily as needed.    senna-docusate 8.6-50 mg (PERICOLACE) 8.6-50 mg per tablet Take 1 tablet by mouth 2 (two) times daily.    [DISCONTINUED] blood-glucose meter (ACCU-CHEK KAYLIE PLUS METER) Misc To use to check blood sugars 4 times a day    [DISCONTINUED] clorazepate (TRANXENE) 3.75 MG Tab Take 7.5 mg by mouth nightly as needed.     [DISCONTINUED] dextrose (GLUCOSE GEL) 40 % gel Take 37.5 mLs (15,000 mg total) by mouth once as needed (hypoglycemia).    [DISCONTINUED] ezetimibe (ZETIA) 10 mg tablet Take 1 tablet (10 mg total) by mouth once daily.    [DISCONTINUED] fenofibrate 160 MG Tab Take 1 tablet (160 mg total) by mouth once daily.    [DISCONTINUED] lancing device with lancets (ACCU-CHEK SOFT DEV LANCETS) Kit To check blood sugars 4 times per day    [DISCONTINUED] pantoprazole (PROTONIX) 40 MG tablet Take 1 tablet (40 mg total) by mouth once daily.     Family History       Problem Relation (Age of Onset)    Cancer Paternal Grandmother    Diabetes Father, Sister, Sister, Maternal Aunt, Maternal Grandmother    Heart disease Maternal Grandmother    Hyperlipidemia Mother    Hypertension Mother, Father "          Tobacco Use    Smoking status: Never    Smokeless tobacco: Never   Substance and Sexual Activity    Alcohol use: No    Drug use: No    Sexual activity: Yes     Partners: Male     Review of Systems   Constitutional: Negative.    HENT: Negative.     Eyes: Negative.    Respiratory: Negative.     Cardiovascular: Negative.    Gastrointestinal: Negative.    Endocrine: Negative.    Genitourinary: Negative.    Musculoskeletal: Negative.    Skin: Negative.         Left foot not healing   Allergic/Immunologic: Negative.    Neurological: Negative.    Hematological: Negative.    All other systems reviewed and are negative.  Objective:     Vital Signs (Most Recent):  Temp: 98.2 °F (36.8 °C) (10/17/22 1619)  Pulse: 79 (10/17/22 2000)  Resp: 18 (10/17/22 1619)  BP: (!) 167/77 (10/17/22 2000)  SpO2: 97 % (10/17/22 1619)   Vital Signs (24h Range):  Temp:  [98.2 °F (36.8 °C)] 98.2 °F (36.8 °C)  Pulse:  [79-84] 79  Resp:  [18] 18  SpO2:  [97 %] 97 %  BP: (131-167)/(60-77) 167/77     Weight: 83.9 kg (185 lb)  Body mass index is 27.32 kg/m².    Physical Exam  Vitals and nursing note reviewed.   Constitutional:       Appearance: She is well-developed.   HENT:      Head: Normocephalic and atraumatic.      Right Ear: External ear normal.      Left Ear: External ear normal.      Nose: Nose normal.   Eyes:      Conjunctiva/sclera: Conjunctivae normal.      Pupils: Pupils are equal, round, and reactive to light.   Cardiovascular:      Rate and Rhythm: Normal rate and regular rhythm.      Heart sounds: Normal heart sounds.   Pulmonary:      Effort: Pulmonary effort is normal.      Breath sounds: Normal breath sounds.   Abdominal:      General: Bowel sounds are normal.      Palpations: Abdomen is soft.   Musculoskeletal:         General: Normal range of motion.      Cervical back: Normal range of motion and neck supple.      Comments: Left foot dressing left in place; dry   Skin:     General: Skin is warm and dry.      Capillary  Refill: Capillary refill takes less than 2 seconds.   Neurological:      Mental Status: She is alert and oriented to person, place, and time.   Psychiatric:         Behavior: Behavior normal.         Thought Content: Thought content normal.         Judgment: Judgment normal.         CRANIAL NERVES     CN III, IV, VI   Pupils are equal, round, and reactive to light.     Significant Labs: All pertinent labs within the past 24 hours have been reviewed.  CBC:   Recent Labs   Lab 10/17/22  1801   WBC 9.12   HGB 11.7*   HCT 36.2*        CMP:   Recent Labs   Lab 10/17/22  1801   *   K 4.0   CL 90*   CO2 35*   *   BUN 33*   CREATININE 4.8*   CALCIUM 9.3   PROT 8.7*   ALBUMIN 4.2   BILITOT 0.7   ALKPHOS 83   AST 24   ALT 28   ANIONGAP 9     Cardiac Markers: No results for input(s): CKMB, MYOGLOBIN, BNP, TROPISTAT in the last 48 hours.    Significant Imaging: I have reviewed all pertinent imaging results/findings within the past 24 hours.    Assessment/Plan:     * PAD (peripheral artery disease)  Inpatient admission to med/tele; continue home regimen for DM, ESRD, HTN, HLD and PAF; Nephrology for RRT; Vascular consult; Podiatry consult; Wound care consult; Insulin sliding scale    Diabetic ulcer of left midfoot associated with type 2 diabetes mellitus  Patient's FSGs are controlled on current medication regimen.  Last A1c reviewed-   Lab Results   Component Value Date    LABA1C 10.3 (H) 09/11/2015    HGBA1C 7.5 (H) 06/22/2022     Most recent fingerstick glucose reviewed- No results for input(s): POCTGLUCOSE in the last 24 hours.  Current correctional scale  Medium  Maintain anti-hyperglycemic dose as follows-   Antihyperglycemics (From admission, onward)    None        Hold Oral hypoglycemics while patient is in the hospital.    Inpatient admission to med/tele; continue home regimen for DM, ESRD, HTN, HLD and PAF; Nephrology for RRT; Vascular consult; Podiatry consult; Wound care consult; Insulin sliding  scale    Type 2 diabetes mellitus with kidney complication, with long-term current use of insulin  Patient's FSGs are controlled on current medication regimen.  Last A1c reviewed-   Lab Results   Component Value Date    LABA1C 10.3 (H) 09/11/2015    HGBA1C 7.5 (H) 06/22/2022     Most recent fingerstick glucose reviewed- No results for input(s): POCTGLUCOSE in the last 24 hours.  Current correctional scale  Medium  Maintain anti-hyperglycemic dose as follows-   Antihyperglycemics (From admission, onward)    None        Hold Oral hypoglycemics while patient is in the hospital.      Inpatient admission to med/tele; continue home regimen for DM, ESRD, HTN, HLD and PAF; Nephrology for RRT; Vascular consult; Podiatry consult; Wound care consult; Insulin sliding scale    ESRD (end stage renal disease)  Inpatient admission to med/tele; continue home regimen for DM, ESRD, HTN, HLD and PAF; Nephrology for RRT; Vascular consult; Podiatry consult; Wound care consult; Insulin sliding scale      Hypertension associated with diabetes  Patient's FSGs are controlled on current medication regimen.  Last A1c reviewed-   Lab Results   Component Value Date    LABA1C 10.3 (H) 09/11/2015    HGBA1C 7.5 (H) 06/22/2022     Most recent fingerstick glucose reviewed- No results for input(s): POCTGLUCOSE in the last 24 hours.  Current correctional scale  Medium  Maintain anti-hyperglycemic dose as follows-     Inpatient admission to med/tele; continue home regimen for DM, ESRD, HTN, HLD and PAF; Nephrology for RRT; Vascular consult; Podiatry consult; Wound care consult; Insulin sliding scale  Antihyperglycemics (From admission, onward)    None        Hold Oral hypoglycemics while patient is in the hospital.      VTE Risk Mitigation (From admission, onward)    None             Woody Acosta MD  Department of Hospital Medicine   [facility] - Emergency Dept

## 2022-10-18 NOTE — CONSULTS
Pending sale to [facility]  Podiatry  Consult Note    Patient Name: Leanna García  MRN: [id number]  Admission Date: 10/17/2022  Hospital Length of Stay: 0 days  Attending Physician: Pietro Shore MD  Primary Care Provider: Stefano Lopez MD     Consults  Subjective:     History of Present Illness:   Leanna García is a 57 y.o. female with a history as  has a past medical history of A-fib, Anemia due to end stage renal disease (6/30/2022), Arthritis, Bronchitis, Cardiovascular event risk, ASCVD 10-year risk 6.7% (10/15/2016), Diabetes mellitus type II, Diabetic foot infection, Diabetic ulcer of left midfoot (05/05/2022), Disorder of kidney and ureter, Encounter for blood transfusion, ESRD (end stage renal disease) (05/18/2018), MANJINDER (generalized anxiety disorder) (10/25/2016), History of colon polyps (11/02/2016), Hyperlipidemia, Hypertension, and Stroke.         Patient is known to me from previous hospital admissions and outpatient wound care.  Patient has been seen in outpatient wound care for the past several weeks.   Over the past few weeks wounds had began to worsen, which concerned me for possible vascular compromise to extremities, as patient has stopped taking blood thinners once she was discharged from hospital.  Referred patient back to vascular surgery, and intervention is now planned.      Vascular surgery instructed patient to go to the hospital in order for vascular intervention to be done.    Wound VAC is being used on the left mid foot ulcer.  Left heel ulcer did begin to show signs of infection at last visit and a culture was taken-see culture results in chart.      Scheduled Meds:   amLODIPine  10 mg Oral Daily    aspirin  81 mg Oral Daily    atorvastatin  80 mg Oral Daily    azelastine  1 spray Nasal BID    calcium acetate(phosphat bind)  667 mg Oral TID WM    cetirizine  10 mg Oral Every other day    clopidogreL  75 mg Oral Daily    doxycycline monohydrate  1 tablet Oral BID     enoxaparin  30 mg Subcutaneous Daily    [START ON 10/19/2022] epoetin chris-epbx  50 Units/kg Subcutaneous Every Mon, Wed, Fri    fluticasone propionate  2 spray Each Nostril Daily    gabapentin  100 mg Oral BID    insulin detemir U-100  15 Units Subcutaneous BID    losartan  100 mg Oral Daily    minoxidiL  10 mg Oral BID    senna-docusate 8.6-50 mg  1 tablet Oral BID     Continuous Infusions:   lactated ringers 100 mL/hr at 10/17/22 2232     PRN Meds:acetaminophen, aspirin, clopidogreL, dextrose 10%, dextrose 10%, fentaNYL, glucagon (human recombinant), glucose, glucose, heparin (porcine), insulin aspart U-100, iodixanoL, LIDOcaine (PF) 10 mg/ml (1%), melatonin, midazolam, ondansetron, oxyCODONE, polyethylene glycol    Review of patient's allergies indicates:   Allergen Reactions    Dilaudid [hydromorphone] Nausea And Vomiting    Chlorhexidine Itching    Lortab [hydrocodone-acetaminophen] Itching        Past Medical History:   Diagnosis Date    A-fib     resolved per patient    Anemia due to end stage renal disease 6/30/2022    Arthritis     Bronchitis     Cardiovascular event risk, ASCVD 10-year risk 6.7% 10/15/2016    Diabetes mellitus type II     Diabetic foot infection     Diabetic ulcer of left midfoot 05/05/2022    Disorder of kidney and ureter     Encounter for blood transfusion     ESRD (end stage renal disease) 05/18/2018    MANJINDER (generalized anxiety disorder) 10/25/2016    History of colon polyps 11/02/2016    Hyperlipidemia     Hypertension     Stroke      Past Surgical History:   Procedure Laterality Date    AV FISTULA PLACEMENT Left 8/29/2022    Procedure: CREATION, AV FISTULA;  Surgeon: Ali Khoobehi, MD;  Location: [facility] OR;  Service: Vascular;  Laterality: Left;    BONE BIOPSY Left 8/24/2022    Procedure: Biopsy-Bone;  Surgeon: Porfirio Ponce DPM;  Location: [facility] OR;  Service: Podiatry;  Laterality: Left;    COLONOSCOPY N/A 10/5/2016    Procedure: COLONOSCOPY;   Surgeon: Pillo Chanel MD;  Location: [facility] ENDO;  Service: Endoscopy;  Laterality: N/A;    COLONOSCOPY N/A 4/26/2022    Procedure: COLONOSCOPY;  Surgeon: Saravanan Rachel MD;  Location: [facility] ENDO;  Service: Endoscopy;  Laterality: N/A;    FISTULOGRAM Left 8/24/2022    Procedure: Fistulogram;  Surgeon: Ali Khoobehi, MD;  Location: [facility] CATH/EP LAB;  Service: Vascular;  Laterality: Left;    HERNIA REPAIR Bilateral 11/22/2016    inguinal    HYSTERECTOMY      INCISION AND DRAINAGE FOOT Left 5/13/2022    Procedure: INCISION AND DRAINAGE, FOOT;  Surgeon: Ricardo Bruno DPM;  Location: [facility] OR;  Service: Podiatry;  Laterality: Left;    OOPHORECTOMY      THROMBECTOMY, AV FISTULA, UPPER EXTREMITY, PERCUTANEOUS  8/24/2022    Procedure: THROMBECTOMY, AV FISTULA, UPPER EXTREMITY, PERCUTANEOUS;  Surgeon: Ali Khoobehi, MD;  Location: [facility] CATH/EP LAB;  Service: Vascular;;    TOE AMPUTATION Left 8/26/2022    Procedure: AMPUTATION, TOE;  Surgeon: Porfirio Ponce DPM;  Location: [facility] OR;  Service: Podiatry;  Laterality: Left;       Family History       Problem Relation (Age of Onset)    Cancer Paternal Grandmother    Diabetes Father, Sister, Sister, Maternal Aunt, Maternal Grandmother    Heart disease Maternal Grandmother    Hyperlipidemia Mother    Hypertension Mother, Father          Tobacco Use    Smoking status: Never    Smokeless tobacco: Never   Substance and Sexual Activity    Alcohol use: No    Drug use: No    Sexual activity: Yes     Partners: Male     Review of Systems  Objective:     Vital Signs (Most Recent):  Temp: 97.7 °F (36.5 °C) (10/18/22 0300)  Pulse: 63 (10/18/22 0916)  Resp: 18 (10/18/22 0300)  BP: (!) 118/58 (10/18/22 0916)  SpO2: 95 % (10/18/22 0916)   Vital Signs (24h Range):  Temp:  [97.7 °F (36.5 °C)-98.2 °F (36.8 °C)] 97.7 °F (36.5 °C)  Pulse:  [59-84] 63  Resp:  [16-18] 18  SpO2:  [95 %-99 %] 95 %  BP: (103-167)/(58-79) 118/58     Weight: 84.1 kg (185 lb 6.5 oz)  Body mass index is  27.38 kg/m².    Foot Exam    Right Foot/Ankle     Neurovascular  Dorsalis pedis: absent  Posterior tibial: absent  Saphenous nerve sensation: absent  Tibial nerve sensation: absent  Superficial peroneal nerve sensation: absent  Deep peroneal nerve sensation: absent  Sural nerve sensation: absent      Left Foot/Ankle      Neurovascular  Dorsalis pedis: absent  Posterior tibial: absent  Saphenous nerve sensation: absent  Tibial nerve sensation: absent  Superficial peroneal nerve sensation: absent  Deep peroneal nerve sensation: absent  Sural nerve sensation: absent      Wound VAC in place did not disturb dressing.        Laboratory:  All pertinent labs reviewed within the last 24 hours.    Diagnostic Results:  I have reviewed all pertinent imaging results/findings within the past 24 hours.    Assessment/Plan:     Diabetic ulcer of left midfoot associated with type 2 diabetes mellitus  Vascular intervention planned.    Infectious Disease consulted.  Recent culture results in chart from left heel wound.    Continue wound VAC to left mid foot wound twice weekly changes    Continue Betadine painting to left heel wound with additional padding and no compression once daily    Protein drinks and Wilman ordered    Patient needs to float heels at all times while lying in bed.  Heel pillow boots as well    No surgical intervention planned by Podiatry.  Patient can follow-up outpatient wound clinic once discharged          Thank you for your consult. I will follow-up with patient. Please contact us if you have any additional questions.    Alivia Bruno DPM  Podiatry  [facility]

## 2022-10-18 NOTE — ED PROVIDER NOTES
Encounter Date: 10/17/2022       History     Chief Complaint   Patient presents with    Wound Check     Left foot - cultured last Tuesday that came back positive and sent by Dr. Khoobehi     Patient presents complaining of left foot wound.  Patient states she was advised to return to the ER by Dr.Khoobehi and Dr. Molina seals.  Patient states she will have an angiogram tomorrow.  Patient has had chronic wound infections to the left and lower legs.  Patient states she had a positive wound culture.    Review of patient's allergies indicates:   Allergen Reactions    Dilaudid [hydromorphone] Nausea And Vomiting    Chlorhexidine Itching    Lortab [hydrocodone-acetaminophen] Itching     Past Medical History:   Diagnosis Date    A-fib     resolved per patient    Anemia due to end stage renal disease 6/30/2022    Arthritis     Bronchitis     Cardiovascular event risk, ASCVD 10-year risk 6.7% 10/15/2016    Diabetes mellitus type II     Diabetic foot infection     Diabetic ulcer of left midfoot 05/05/2022    Disorder of kidney and ureter     Encounter for blood transfusion     ESRD (end stage renal disease) 05/18/2018    MANJINDER (generalized anxiety disorder) 10/25/2016    History of colon polyps 11/02/2016    Hyperlipidemia     Hypertension     Stroke      Past Surgical History:   Procedure Laterality Date    AV FISTULA PLACEMENT Left 8/29/2022    Procedure: CREATION, AV FISTULA;  Surgeon: Ali Khoobehi, MD;  Location: [facility] OR;  Service: Vascular;  Laterality: Left;    BONE BIOPSY Left 8/24/2022    Procedure: Biopsy-Bone;  Surgeon: Porfirio Ponce DPM;  Location: [facility] OR;  Service: Podiatry;  Laterality: Left;    COLONOSCOPY N/A 10/5/2016    Procedure: COLONOSCOPY;  Surgeon: Pillo Chanel MD;  Location: [facility];  Service: Endoscopy;  Laterality: N/A;    COLONOSCOPY N/A 4/26/2022    Procedure: COLONOSCOPY;  Surgeon: Saravanan Rachel MD;  Location: [facility] ENDO;  Service: Endoscopy;  Laterality: N/A;    FISTULOGRAM Left  8/24/2022    Procedure: Fistulogram;  Surgeon: Ali Khoobehi, MD;  Location: [facility] CATH/EP LAB;  Service: Vascular;  Laterality: Left;    HERNIA REPAIR Bilateral 11/22/2016    inguinal    HYSTERECTOMY      INCISION AND DRAINAGE FOOT Left 5/13/2022    Procedure: INCISION AND DRAINAGE, FOOT;  Surgeon: Ricardo Bruno DPM;  Location: [facility] OR;  Service: Podiatry;  Laterality: Left;    OOPHORECTOMY      THROMBECTOMY, AV FISTULA, UPPER EXTREMITY, PERCUTANEOUS  8/24/2022    Procedure: THROMBECTOMY, AV FISTULA, UPPER EXTREMITY, PERCUTANEOUS;  Surgeon: Ali Khoobehi, MD;  Location: [facility] CATH/EP LAB;  Service: Vascular;;    TOE AMPUTATION Left 8/26/2022    Procedure: AMPUTATION, TOE;  Surgeon: Porfirio Ponce DPM;  Location: [facility] OR;  Service: Podiatry;  Laterality: Left;     Family History   Problem Relation Age of Onset    Hyperlipidemia Mother     Hypertension Mother     Hypertension Father     Diabetes Father     Diabetes Sister     Diabetes Sister     Diabetes Maternal Aunt     Diabetes Maternal Grandmother     Heart disease Maternal Grandmother     Cancer Paternal Grandmother     Breast cancer Neg Hx     Colon cancer Neg Hx     Ovarian cancer Neg Hx      Social History     Tobacco Use    Smoking status: Never    Smokeless tobacco: Never   Substance Use Topics    Alcohol use: No    Drug use: No     Review of Systems   All other systems reviewed and are negative.    Physical Exam     Initial Vitals [10/17/22 1619]   BP Pulse Resp Temp SpO2   131/60 84 18 98.2 °F (36.8 °C) 97 %      MAP       --         Physical Exam    Nursing note and vitals reviewed.  Constitutional: She appears well-developed and well-nourished.   Pleasant, polite   HENT:   Head: Normocephalic and atraumatic.   Eyes: EOM are normal.   Neck: Neck supple.   Normal range of motion.  Pulmonary/Chest: No respiratory distress.   Musculoskeletal:      Cervical back: Normal range of motion and neck supple.      Comments: Patient evaluated at triage and both  lower extremity are wrapped extensively.     Neurological: She is alert and oriented to person, place, and time.   Skin: Capillary refill takes less than 2 seconds.   Psychiatric: She has a normal mood and affect. Her behavior is normal. Judgment and thought content normal.       ED Course   Procedures  Labs Reviewed   CBC W/ AUTO DIFFERENTIAL - Abnormal; Notable for the following components:       Result Value    Hemoglobin 11.7 (*)     Hematocrit 36.2 (*)     All other components within normal limits   COMPREHENSIVE METABOLIC PANEL - Abnormal; Notable for the following components:    Sodium 134 (*)     Chloride 90 (*)     CO2 35 (*)     Glucose 193 (*)     BUN 33 (*)     Creatinine 4.8 (*)     Total Protein 8.7 (*)     eGFR 10.0 (*)     All other components within normal limits   SARS-COV-2 RNA AMPLIFICATION, QUAL          Imaging Results              US Lower Extremity Arteries Bilateral (Edited Result - FINAL)  Result time 10/18/22 01:16:19      Edited Result - FINAL by Trish Cooper MD (10/18/22 01:16:19)                   Narrative:         ADDENDUM #1       Addendum:  Comparison is made to previous examination dated 5/6/2022. Similar findings on the previous examination, with no significant change in the finding of abnormal monophasic waveforms throughout bilateral lower extremities.    Electronically signed by:  Trish Cooper MD  10/18/2022 1:16 AM CDT Workstation: [id number]     ORIGINAL REPORT       EXAM DESCRIPTION:  US LOWER EXTREMITY ARTERIES BILATERAL    CLINICAL HISTORY:  pad.    TECHNIQUE:  Real time grey scale and Doppler ultrasound were utilized to evaluate the major arterial structures of the bilateral lower extremities.    COMPARISON: None.    FINDINGS:  Diffuse calcified plaque bilaterally.  Monophasic waveforms are seen throughout the bilateral lower extremities, suggesting more proximal aortoiliac disease.  There is elevated peak systolic velocity within the left mid superficial  femoral artery compatible with borderline moderate stenosis (50-74%, though likely near 50%).  No other focal elevation of the peak systolic velocity to suggest any focal hemodynamically significant stenosis.  Due to bandages on both feet, the bilateral dorsalis pedis artery was not evaluated.    Right leg (velocities in cm/s):  CFA: 129  PFA: 109  SFA-proximal: 123  SFA-mid: 105  SFA-distal: 117  Popliteal: 69  PTA: 47, 18, mid to distal  ESAU: 33  Peroneal: 18, 17, mid to distal    Left leg (velocities in cm/s):  CFA: 174  PFA: 129  SFA-proximal: 140  SFA-mid: 201  SFA-distal: 123  Popliteal: 129  PTA: 43, 37, mid to distal  ESAU: 101  Peroneal: 84    IMPRESSION:  1.  Abnormal monophasic waveforms are seen throughout the bilateral lower extremities, suggesting more proximal aortoiliac disease.  2.  Elevated peak systolic velocity within the left mid superficial femoral artery compatible with borderline moderate stenosis (50-74%, though likely near 50%.    Electronically signed by:  Trish Cooper MD  10/17/2022 11:23 PM CDT Workstation: [id number]                      Final result by Trish Cooper MD (10/17/22 23:23:10)                   Narrative:    EXAM DESCRIPTION:  US LOWER EXTREMITY ARTERIES BILATERAL    CLINICAL HISTORY:  pad.    TECHNIQUE:  Real time grey scale and Doppler ultrasound were utilized to evaluate the major arterial structures of the bilateral lower extremities.    COMPARISON: None.    FINDINGS:  Diffuse calcified plaque bilaterally.  Monophasic waveforms are seen throughout the bilateral lower extremities, suggesting more proximal aortoiliac disease.  There is elevated peak systolic velocity within the left mid superficial femoral artery compatible with borderline moderate stenosis (50-74%, though likely near 50%).  No other focal elevation of the peak systolic velocity to suggest any focal hemodynamically significant stenosis.  Due to bandages on both feet, the bilateral dorsalis pedis  artery was not evaluated.    Right leg (velocities in cm/s):  CFA: 129  PFA: 109  SFA-proximal: 123  SFA-mid: 105  SFA-distal: 117  Popliteal: 69  PTA: 47, 18, mid to distal  ESAU: 33  Peroneal: 18, 17, mid to distal    Left leg (velocities in cm/s):  CFA: 174  PFA: 129  SFA-proximal: 140  SFA-mid: 201  SFA-distal: 123  Popliteal: 129  PTA: 43, 37, mid to distal  ESAU: 101  Peroneal: 84    IMPRESSION:  1.  Abnormal monophasic waveforms are seen throughout the bilateral lower extremities, suggesting more proximal aortoiliac disease.  2.  Elevated peak systolic velocity within the left mid superficial femoral artery compatible with borderline moderate stenosis (50-74%, though likely near 50%.    Electronically signed by:  Trish Cooper MD  10/17/2022 11:23 PM CDT Workstation: [id number]                                     X-Ray Chest AP Portable (In process)                      Medications   amLODIPine tablet 10 mg (has no administration in time range)   aspirin EC tablet 81 mg (has no administration in time range)   atorvastatin tablet 80 mg (has no administration in time range)   azelastine 137 mcg (0.1 %) nasal spray 137 mcg (137 mcg Nasal Given 10/17/22 2223)   calcium acetate(phosphat bind) capsule 667 mg (has no administration in time range)   cetirizine tablet 10 mg (has no administration in time range)   clopidogreL tablet 75 mg (has no administration in time range)   doxycycline monohydrate Tab 100 mg (0 mg Oral Hold 10/17/22 2224)   epoetin chris-epbx injection 4,440 Units (has no administration in time range)   fluticasone propionate 50 mcg/actuation nasal spray 100 mcg (has no administration in time range)   gabapentin capsule 100 mg (100 mg Oral Given 10/17/22 2222)   insulin detemir U-100 pen 15 Units (15 Units Subcutaneous Given 10/17/22 2224)   losartan tablet 100 mg (has no administration in time range)   minoxidiL tablet 10 mg (10 mg Oral Given 10/17/22 2221)   oxyCODONE immediate release tablet 5  mg (5 mg Oral Not Given 10/17/22 2216)   polyethylene glycol packet 17 g (has no administration in time range)   senna-docusate 8.6-50 mg per tablet 1 tablet (1 tablet Oral Given 10/17/22 2223)   melatonin tablet 6 mg (6 mg Oral Given 10/17/22 2222)   lactated ringers infusion ( Intravenous New Bag 10/17/22 2232)   enoxaparin injection 30 mg (30 mg Subcutaneous Given 10/17/22 2223)   acetaminophen tablet 650 mg (has no administration in time range)   ondansetron injection 4 mg (has no administration in time range)   glucose chewable tablet 16 g (has no administration in time range)   glucose chewable tablet 24 g (has no administration in time range)   glucagon (human recombinant) injection 1 mg (has no administration in time range)   dextrose 10% bolus 125 mL (has no administration in time range)   dextrose 10% bolus 250 mL (has no administration in time range)   insulin aspart U-100 pen 1-10 Units (2 Units Subcutaneous Given 10/17/22 2223)     Medical Decision Making:   Initial Assessment:   No apparent distress  Differential Diagnosis:   Patient sent by her doctor for angiogram in the morning.  ED Management:  Patient has been consulted to Hospital Medicine for admission.                        Clinical Impression:   Final diagnoses:  [Z51.89] Encounter for wound re-check (Primary)  [Z01.818] Pre-op evaluation  [I73.9] Peripheral vascular disease, unspecified        ED Disposition Condition    Observation                 Abel Huffman MD  10/18/22 3523

## 2022-10-18 NOTE — HPI
Leanna García is a 57 y.o. female with a history as  has a past medical history of A-fib, Anemia due to end stage renal disease (6/30/2022), Arthritis, Bronchitis, Cardiovascular event risk, ASCVD 10-year risk 6.7% (10/15/2016), Diabetes mellitus type II, Diabetic foot infection, Diabetic ulcer of left midfoot (05/05/2022), Disorder of kidney and ureter, Encounter for blood transfusion, ESRD (end stage renal disease) (05/18/2018), MANJINDER (generalized anxiety disorder) (10/25/2016), History of colon polyps (11/02/2016), Hyperlipidemia, Hypertension, and Stroke.         Patient is known to me from previous hospital admissions and outpatient wound care.  Patient has been seen in outpatient wound care for the past several weeks.   Over the past few weeks wounds had began to worsen, which concerned me for possible vascular compromise to extremities, as patient has stopped taking blood thinners once she was discharged from hospital.  Referred patient back to vascular surgery, and intervention is now planned.      Vascular surgery instructed patient to go to the hospital in order for vascular intervention to be done.    Wound VAC is being used on the left mid foot ulcer.  Left heel ulcer did begin to show signs of infection at last visit and a culture was taken-see culture results in chart.

## 2022-10-18 NOTE — ASSESSMENT & PLAN NOTE
Vascular intervention planned.    Infectious Disease consulted.  Recent culture results in chart from left heel wound.    Continue wound VAC to left mid foot wound twice weekly changes    Continue Betadine painting to left heel wound with additional padding and no compression once daily    Protein drinks and Wilman ordered    Patient needs to float heels at all times while lying in bed.  Heel pillow boots as well    No surgical intervention planned by Podiatry.  Patient can follow-up outpatient wound clinic once discharged

## 2022-10-18 NOTE — HPI
57-year-old female with history of DM 2; ESRD on RRT; PAF, HLD, CAD, HTN, CVA, Non-healing DM foot ulcer was sent to ED for admission for angiography of lower extremities with possible surgical intervention for non-healing ulcer. She was recently in hospital for same. She has had osteomyelitis (bone biopsy grew Proteus) with eventual amputation of the 1st toe and partial 1st metatarsal head amputation on 8/26. This was followed by 2 weeks of cefepime as outpatient. Patient was discharged on clopidogrel, but reportedly did not start until a fortnight or so post discharge when she saw Podiatry for follow-up. She has since begun antiplatelet therapy. However, wound is not healing. Is for angiogram in AM with possible intervention pending angiography results. Denied chest discomfort. No SOB.     In ED: Labs reviewed: no leukocytosis with minimal normocytic anemia; trivial hyponatremia with end stage renal dysfunction. COVID negative. CXR reviewed: NAPD. EKG reviewed: sinus, left axis, no acute segments.    Discussed with ED MD: Inpatient admission to med/tele; continue home regimen for DM, ESRD, HTN, HLD and PAF; Nephrology for RRT; Vascular consult; Podiatry consult; Wound care consult; Insulin sliding scale

## 2022-10-19 PROBLEM — L89.623 PRESSURE INJURY OF LEFT HEEL, STAGE 3: Status: ACTIVE | Noted: 2022-10-19

## 2022-10-19 LAB
ALBUMIN SERPL BCP-MCNC: 3.6 G/DL (ref 3.5–5.2)
ANION GAP SERPL CALC-SCNC: 13 MMOL/L (ref 8–16)
BUN SERPL-MCNC: 61 MG/DL (ref 6–20)
CALCIUM SERPL-MCNC: 9.3 MG/DL (ref 8.7–10.5)
CHLORIDE SERPL-SCNC: 95 MMOL/L (ref 95–110)
CO2 SERPL-SCNC: 28 MMOL/L (ref 23–29)
CREAT SERPL-MCNC: 7.1 MG/DL (ref 0.5–1.4)
ERYTHROCYTE [DISTWIDTH] IN BLOOD BY AUTOMATED COUNT: 13.8 % (ref 11.5–14.5)
EST. GFR  (NO RACE VARIABLE): 6.3 ML/MIN/1.73 M^2
GLUCOSE SERPL-MCNC: 109 MG/DL (ref 70–110)
GLUCOSE SERPL-MCNC: 113 MG/DL (ref 70–110)
GLUCOSE SERPL-MCNC: 148 MG/DL (ref 70–110)
GLUCOSE SERPL-MCNC: 68 MG/DL (ref 70–110)
GLUCOSE SERPL-MCNC: 71 MG/DL (ref 70–110)
HCT VFR BLD AUTO: 34.4 % (ref 37–48.5)
HGB BLD-MCNC: 11.1 G/DL (ref 12–16)
MCH RBC QN AUTO: 28.1 PG (ref 27–31)
MCHC RBC AUTO-ENTMCNC: 32.3 G/DL (ref 32–36)
MCV RBC AUTO: 87 FL (ref 82–98)
PHOSPHATE SERPL-MCNC: 7.8 MG/DL (ref 2.7–4.5)
PLATELET # BLD AUTO: 276 K/UL (ref 150–450)
PMV BLD AUTO: 10.2 FL (ref 9.2–12.9)
POTASSIUM SERPL-SCNC: 4.1 MMOL/L (ref 3.5–5.1)
RBC # BLD AUTO: 3.95 M/UL (ref 4–5.4)
SODIUM SERPL-SCNC: 136 MMOL/L (ref 136–145)
WBC # BLD AUTO: 7.87 K/UL (ref 3.9–12.7)

## 2022-10-19 PROCEDURE — 90935 HEMODIALYSIS ONE EVALUATION: CPT

## 2022-10-19 PROCEDURE — 12000002 HC ACUTE/MED SURGE SEMI-PRIVATE ROOM

## 2022-10-19 PROCEDURE — 85027 COMPLETE CBC AUTOMATED: CPT | Performed by: INTERNAL MEDICINE

## 2022-10-19 PROCEDURE — 99222 PR INITIAL HOSPITAL CARE,LEVL II: ICD-10-PCS | Mod: ,,, | Performed by: INTERNAL MEDICINE

## 2022-10-19 PROCEDURE — 99222 1ST HOSP IP/OBS MODERATE 55: CPT | Mod: ,,, | Performed by: INTERNAL MEDICINE

## 2022-10-19 PROCEDURE — 99900035 HC TECH TIME PER 15 MIN (STAT)

## 2022-10-19 PROCEDURE — 63600175 PHARM REV CODE 636 W HCPCS: Performed by: INTERNAL MEDICINE

## 2022-10-19 PROCEDURE — 25000003 PHARM REV CODE 250: Performed by: INTERNAL MEDICINE

## 2022-10-19 PROCEDURE — 97605 NEG PRS WND THER DME<=50SQCM: CPT

## 2022-10-19 PROCEDURE — 25000242 PHARM REV CODE 250 ALT 637 W/ HCPCS: Performed by: INTERNAL MEDICINE

## 2022-10-19 PROCEDURE — 99232 PR SUBSEQUENT HOSPITAL CARE,LEVL II: ICD-10-PCS | Mod: 24,,, | Performed by: PODIATRIST

## 2022-10-19 PROCEDURE — 94761 N-INVAS EAR/PLS OXIMETRY MLT: CPT

## 2022-10-19 PROCEDURE — 36415 COLL VENOUS BLD VENIPUNCTURE: CPT | Performed by: INTERNAL MEDICINE

## 2022-10-19 PROCEDURE — 80069 RENAL FUNCTION PANEL: CPT | Performed by: INTERNAL MEDICINE

## 2022-10-19 PROCEDURE — 99232 SBSQ HOSP IP/OBS MODERATE 35: CPT | Mod: 24,,, | Performed by: PODIATRIST

## 2022-10-19 RX ORDER — SODIUM CHLORIDE 9 MG/ML
INJECTION, SOLUTION INTRAVENOUS ONCE
Status: DISCONTINUED | OUTPATIENT
Start: 2022-10-19 | End: 2022-10-20 | Stop reason: HOSPADM

## 2022-10-19 RX ORDER — MUPIROCIN 20 MG/G
OINTMENT TOPICAL 2 TIMES DAILY
Status: DISCONTINUED | OUTPATIENT
Start: 2022-10-19 | End: 2022-10-20 | Stop reason: HOSPADM

## 2022-10-19 RX ADMIN — AZELASTINE HYDROCHLORIDE 137 MCG: 137 SPRAY, METERED NASAL at 09:10

## 2022-10-19 RX ADMIN — GABAPENTIN 100 MG: 100 CAPSULE ORAL at 08:10

## 2022-10-19 RX ADMIN — SODIUM CHLORIDE, SODIUM LACTATE, POTASSIUM CHLORIDE, AND CALCIUM CHLORIDE: .6; .31; .03; .02 INJECTION, SOLUTION INTRAVENOUS at 09:10

## 2022-10-19 RX ADMIN — SODIUM CHLORIDE 250 ML: 0.9 INJECTION, SOLUTION INTRAVENOUS at 05:10

## 2022-10-19 RX ADMIN — MINOXIDIL 10 MG: 2.5 TABLET ORAL at 09:10

## 2022-10-19 RX ADMIN — CALCIUM ACETATE 667 MG: 667 CAPSULE ORAL at 12:10

## 2022-10-19 RX ADMIN — DOXYCYCLINE 100 MG: 100 TABLET, FILM COATED ORAL at 09:10

## 2022-10-19 RX ADMIN — SENNOSIDES AND DOCUSATE SODIUM 1 TABLET: 50; 8.6 TABLET ORAL at 08:10

## 2022-10-19 RX ADMIN — OXYCODONE HYDROCHLORIDE 5 MG: 5 TABLET ORAL at 08:10

## 2022-10-19 RX ADMIN — ATORVASTATIN CALCIUM 80 MG: 40 TABLET, FILM COATED ORAL at 09:10

## 2022-10-19 RX ADMIN — ASPIRIN 81 MG: 81 TABLET, COATED ORAL at 08:10

## 2022-10-19 RX ADMIN — DOXYCYCLINE 100 MG: 100 TABLET, FILM COATED ORAL at 08:10

## 2022-10-19 RX ADMIN — CALCIUM ACETATE 667 MG: 667 CAPSULE ORAL at 08:10

## 2022-10-19 RX ADMIN — Medication 6 MG: at 09:10

## 2022-10-19 RX ADMIN — CALCIUM ACETATE 667 MG: 667 CAPSULE ORAL at 05:10

## 2022-10-19 RX ADMIN — FLUTICASONE PROPIONATE 100 MCG: 50 SPRAY, METERED NASAL at 08:10

## 2022-10-19 RX ADMIN — AZELASTINE HYDROCHLORIDE 137 MCG: 137 SPRAY, METERED NASAL at 08:10

## 2022-10-19 RX ADMIN — INSULIN DETEMIR 15 UNITS: 100 INJECTION, SOLUTION SUBCUTANEOUS at 08:10

## 2022-10-19 RX ADMIN — GABAPENTIN 100 MG: 100 CAPSULE ORAL at 09:10

## 2022-10-19 RX ADMIN — MUPIROCIN 1 G: 20 OINTMENT TOPICAL at 09:10

## 2022-10-19 RX ADMIN — ENOXAPARIN SODIUM 30 MG: 30 INJECTION SUBCUTANEOUS at 05:10

## 2022-10-19 RX ADMIN — SENNOSIDES AND DOCUSATE SODIUM 1 TABLET: 50; 8.6 TABLET ORAL at 09:10

## 2022-10-19 RX ADMIN — EPOETIN ALFA-EPBX 4440 UNITS: 4000 INJECTION, SOLUTION INTRAVENOUS; SUBCUTANEOUS at 05:10

## 2022-10-19 RX ADMIN — CLOPIDOGREL BISULFATE 75 MG: 75 TABLET, FILM COATED ORAL at 08:10

## 2022-10-19 NOTE — PLAN OF CARE
[facility]  Initial Discharge Assessment       Primary Care Provider: Stefano Lopez MD    Admission Diagnosis: Peripheral vascular disease, unspecified [I73.9]  Encounter for wound re-check [Z51.89]  PVD (peripheral vascular disease) [I73.9]    Admission Date: 10/17/2022  Expected Discharge Date:   Patient on isolation.    CM spoke with the patient and spouse via phone.  Verified information on face sheet.  Patient is on dialysis with [facility] in [facility].  Receive HD on Mon, Wed and Fridays.  No coumadin.  No nebulizer.  Spouse will transport home upon discharge. No needs identified at this time.    Discharge Barriers Identified: None    Payor: [facility] MCARE / Plan: [facility] DUAL COMPLETE HMO SNP / Product Type: Medicare Advantage /     Extended Emergency Contact Information  Primary Emergency Contact: Donovan García  Address: [address] [place] [address] [place]  Mobile Phone: [phone]  Relation: Spouse  Preferred language: English   needed? No  Secondary Emergency Contact: Sonia Cota  Address: [address] [place] [address] [place]  Mobile Phone: [phone]  Relation: Mother  Preferred language: English   needed? No    Discharge Plan A: Home with family  Discharge Plan B: Home with family      [address] [place] [address] [facility]  [address] [facility]  [place] [address]  Phone: [phone] Fax: [phone]      Initial Assessment (most recent)       Adult Discharge Assessment - 10/19/22 1125          Discharge Assessment    Assessment Type Discharge Planning Assessment     Confirmed/corrected address, phone number and insurance Yes     Source of Information patient;family     Does patient/caregiver understand observation status --   n/a    Communicated ERI with patient/caregiver Date not available/Unable to  determine     Reason For Admission PAD (peripheral artery disease)     Lives With spouse     Facility Arrived From: home     Do you expect to return to your current living situation? Yes     Do you have help at home or someone to help you manage your care at home? Yes     Who are your caregiver(s) and their phone number(s)? Donovan García (spouse) [phone]     Prior to hospitilization cognitive status: Unable to Assess     Current cognitive status: Alert/Oriented     Walking or Climbing Stairs Difficulty ambulation difficulty, requires equipment;stair climbing difficulty, requires equipment     Mobility Management walker, hand rails but avoid stairs at this time     Dressing/Bathing Difficulty none     Home Layout Able to live on 1st floor     Equipment Currently Used at Home walker, rolling;wound care supplies     Patient currently being followed by outpatient case management? No     Do you currently have service(s) that help you manage your care at home? Yes     Name and Contact number of agency [facility]     Is the pt/caregiver preference to resume services with current agency Yes     Do you take prescription medications? Yes     Do you have prescription coverage? Yes     Coverage Payor:  [facility] DUAL COMPLETE HMO SNP     Do you have any problems affording any of your prescribed medications? No     Is the patient taking medications as prescribed? yes     Who is going to help you get home at discharge? spouse     How do you get to doctors appointments? family or friend will provide     Are you on dialysis? Yes     Dialysis Name and Scheduled days [facility] mon, wed, fri     Do you take coumadin? No     Discharge Plan A Home with family     Discharge Plan B Home with family     DME Needed Upon Discharge  none     Discharge Plan discussed with: Spouse/sig other;Patient     Name(s) and Number(s) Donovan García     Discharge Barriers Identified None

## 2022-10-19 NOTE — PROGRESS NOTES
"[facility]  Adult Nutrition   Progress Note (Initial Assessment)     SUMMARY     Recommendations  Recommendation/Intervention:   1) Continue with current diet order as tolerated   2) Continue ONS (glucerna) to help meet EEN and EPN   3) Continue Wilman for wound healing   4) RD to monitor appetite, intake, tolerance, and plan of care    Goals: PO intake >75% to meet EEN and EPN  Nutrition Goal Status: new    Dietitian Rounds Brief  Screened for risk - nonhealing wound. Pt has pre-existing wound vac. Pt is a 58 y/o F sent to ED for admission for angiography of lower extremities with possible surgical intervention for non-healing ulcer. Spoke with pt at bedside. Patient reports strong appetite and intake. She is receiving and consuming Glucerna and Wilman with meals. No c/o of N/V/D. Pt mentioned her sister ordered her Wilman after her last hospital stay, so she was taking it at home as well. RD will continue to monitor appetite, intake, tolerance, and plan of care.   Last BM: 10/16    Diet order:   Current Diet Order: Diabetic 1800 kcal, renal, cardiac     Evaluation of Received Nutrient/Fluid Intake        % Intake of Estimated Energy Needs: 75 - 100 %  % Meal Intake: 75 - 100 %      Intake/Output Summary (Last 24 hours) at 10/19/2022 1343  Last data filed at 10/19/2022 0950  Gross per 24 hour   Intake 1260 ml   Output --   Net 1260 ml        Anthropometrics  Temp: 98.1 °F (36.7 °C)  Height Method: Stated  Height: 5' 9" (175.3 cm)  Height (inches): 69 in  Weight Method: Bed Scale  Weight: 84.1 kg (185 lb 6.5 oz)  Weight (lb): 185.41 lb  Ideal Body Weight (IBW), Female: 145 lb  % Ideal Body Weight, Female (lb): 127.87 %  BMI (Calculated): 27.4       Estimated/Assessed Needs  Weight Used For Calorie Calculations: 84.1 kg (185 lb 6.5 oz)  Energy Calorie Requirements (kcal): 1682 - 2103 (20-25 kcal/kg)  Energy Need Method: Kcal/kg  Protein Requirements: 101 - 126 (1.2 - 1.5 g/kg) (hemodialysis)  Weight Used " For Protein Calculations: 84.1 kg (185 lb 6.5 oz)  Fluid Requirements (mL): 24 UOP + 1000 ml  Estimated Fluid Requirement Method: other (see comments) (Hemodialysis)  RDA Method (mL): 1682       Reason for Assessment  Reason For Assessment: identified at risk by screening criteria  Diagnosis: other (see comments) (PAD (peripheral artery disease))  Relevant Medical History: DM 2; ESRD on RRT; PAF, HLD, CAD, HTN, CVA, Non-healing DM foot ulcer    Nutrition/Diet History  Food Allergies: NKFA  Factors Affecting Nutritional Intake: None identified at this time    Nutrition Risk Screen  Nutrition Risk Screen: large or nonhealing wound, burn or pressure injury  [REMOVED]      Altered Skin Integrity 05/20/22 1823 Left posterior Heel-Wound Image: Images linked       Incision/Site 08/26/22 1042 Left Foot-Wound Image: Images linked  MST Score: 2  Have you recently lost weight without trying?: Unsure  Weight loss score: 2  Have you been eating poorly because of a decreased appetite?: No  Appetite score: 0       Weight History:  Wt Readings from Last 5 Encounters:   10/19/22 84.1 kg (185 lb 6.5 oz)   09/27/22 79.4 kg (175 lb)   09/15/22 83.7 kg (184 lb 8.4 oz)   09/13/22 (P) 79.4 kg (175 lb)   09/06/22 79.4 kg (175 lb)        Lab/Procedures/Meds: Pertinent Labs/Meds Reviewed    Medications:Pertinent Medications Reviewed  Scheduled Meds:   sodium chloride 0.9%   Intravenous Once    amLODIPine  10 mg Oral Daily    aspirin  81 mg Oral Daily    atorvastatin  80 mg Oral Daily    azelastine  1 spray Nasal BID    calcium acetate(phosphat bind)  667 mg Oral TID WM    cetirizine  10 mg Oral Every other day    clopidogreL  75 mg Oral Daily    doxycycline monohydrate  1 tablet Oral BID    enoxaparin  30 mg Subcutaneous Daily    epoetin chris-epbx  50 Units/kg Subcutaneous Every Mon, Wed, Fri    fluticasone propionate  2 spray Each Nostril Daily    gabapentin  100 mg Oral BID    insulin detemir U-100  15 Units Subcutaneous BID    minoxidiL   10 mg Oral BID    mupirocin   Nasal BID    senna-docusate 8.6-50 mg  1 tablet Oral BID     Continuous Infusions:  PRN Meds:.acetaminophen, dextrose 10%, dextrose 10%, glucagon (human recombinant), glucose, glucose, insulin aspart U-100, melatonin, ondansetron, oxyCODONE, polyethylene glycol, sodium chloride 0.9%    Labs: Pertinent Labs Reviewed  Clinical Chemistry:  Recent Labs   Lab 10/17/22  1801 10/18/22  0605 10/19/22  0501   * 134* 136   K 4.0 4.0 4.1   CL 90* 93* 95   CO2 35* 27 28   * 106 68*   BUN 33* 40* 61*   CREATININE 4.8* 5.7* 7.1*   CALCIUM 9.3 9.2 9.3   PROT 8.7*  --   --    ALBUMIN 4.2 3.5 3.6   BILITOT 0.7  --   --    ALKPHOS 83  --   --    AST 24  --   --    ALT 28  --   --    ANIONGAP 9 14 13   PHOS  --  6.7* 7.8*     CBC:   Recent Labs   Lab 10/19/22  0501   WBC 7.87   RBC 3.95*   HGB 11.1*   HCT 34.4*      MCV 87   MCH 28.1   MCHC 32.3     Diabetes:  Recent Labs   Lab 10/17/22  1801   HGBA1C 7.2*     Monitor and Evaluation  Food and Nutrient Intake: energy intake, food and beverage intake  Food and Nutrient Adminstration: diet order  Knowledge/Beliefs/Attitudes: food and nutrition knowledge/skill  Physical Activity and Function: nutrition-related ADLs and IADLs  Anthropometric Measurements: weight, weight change, body mass index  Biochemical Data, Medical Tests and Procedures: electrolyte and renal panel, gastrointestinal profile, glucose/endocrine profile  Nutrition-Focused Physical Findings: overall appearance     Nutrition Risk  Level of Risk/Frequency of Follow-up: moderate     Nutrition Follow-Up  RD Follow-up?: Yes    Shira Palmer, Dietetic Intern     Sonia Christiansen, RD, LDN 10/19/2022 1:30 PM   I certify that I directed the dietetic intern in service delivery and guided them using my skilled judgment. As the cosigning dietitian, I have reviewed the dietetic interns documentation and am responsible for the treatment, assessment, and plan.

## 2022-10-19 NOTE — ASSESSMENT & PLAN NOTE
Patient can be discharged from Podiatry standpoint and follow up outpatient in wound clinic    Infectious Disease consulted.  Recent culture results in chart from left heel wound.  Left heel wound has signs of erythema surrounding wound.      Continue wound VAC to left mid foot wound twice weekly changes    Continue Betadine painting to left heel wound with additional padding and no compression once daily    Protein drinks and Wilman ordered    Patient needs to float heels at all times while lying in bed.  Heel pillow boots as well

## 2022-10-19 NOTE — PROGRESS NOTES
[facility] Medicine  Progress Note    Patient name: Leanna García  MRN: [id number]  Admit Date: 10/17/2022   LOS: 1 day     SUBJECTIVE:     Principal problem: PAD (peripheral artery disease)    Interval History:  Patient was seen and examined bedside, denies any new symptoms.  No other acute events overnight as per nursing staff.  Wound care just dressed left foot.     Scheduled Meds:   amLODIPine  10 mg Oral Daily    aspirin  81 mg Oral Daily    atorvastatin  80 mg Oral Daily    azelastine  1 spray Nasal BID    calcium acetate(phosphat bind)  667 mg Oral TID WM    cetirizine  10 mg Oral Every other day    clopidogreL  75 mg Oral Daily    doxycycline monohydrate  1 tablet Oral BID    enoxaparin  30 mg Subcutaneous Daily    epoetin chris-epbx  50 Units/kg Subcutaneous Every Mon, Wed, Fri    fluticasone propionate  2 spray Each Nostril Daily    gabapentin  100 mg Oral BID    insulin detemir U-100  15 Units Subcutaneous BID    losartan  100 mg Oral Daily    minoxidiL  10 mg Oral BID    senna-docusate 8.6-50 mg  1 tablet Oral BID     Continuous Infusions:   lactated ringers 100 mL/hr at 10/19/22 0904     PRN Meds:acetaminophen, dextrose 10%, dextrose 10%, glucagon (human recombinant), glucose, glucose, insulin aspart U-100, melatonin, ondansetron, oxyCODONE, polyethylene glycol    Review of patient's allergies indicates:   Allergen Reactions    Dilaudid [hydromorphone] Nausea And Vomiting    Chlorhexidine Itching    Lortab [hydrocodone-acetaminophen] Itching       Review of Systems: As per interval history    OBJECTIVE:     Vital Signs (Most Recent)  Temp: 97.6 °F (36.4 °C) (10/19/22 0807)  Pulse: 67 (10/19/22 0807)  Resp: 20 (10/19/22 0821)  BP: 120/60 (10/19/22 0807)  SpO2: 95 % (10/19/22 0807)    Vital Signs Range (Last 24H):  Temp:  [97 °F (36.1 °C)-98.6 °F (37 °C)]   Pulse:  [60-76]   Resp:  [16-20]   BP: ()/(52-87)   SpO2:  [95 %-99 %]     I & O (Last 24H):  Intake/Output Summary  (Last 24 hours) at 10/19/2022 1040  Last data filed at 10/19/2022 0950  Gross per 24 hour   Intake 1260 ml   Output --   Net 1260 ml       Physical Exam:  General: Patient resting comfortably in no acute distress. Appears as stated age. Calm  Eyes: No conjunctival injection. No scleral icterus.  ENT: Hearing grossly intact. No discharge from ears. No nasal discharge.   Neck: Supple, trachea midline. No JVD  CVS: RRR. No LE edema BL  Lungs:  No tachypnea or accessory muscle use.  Clear to auscultation bilaterally  Abdomen:  Soft, nontender and nondistended.  No organomegaly  Neuro: AOx3. Moves all extremities. Follows commands. Responds appropriately   Skin:  Left foot has dressing and wound VAC in place, right groin cath site has no hematoma or erythema              Laboratory:  I have reviewed all pertinent lab results within the past 24 hours.    Diagnostic Results:  Reviewed all imaging    ASSESSMENT/PLAN:     57-year-old with chronic left foot wound referred to emergency room by podiatrist and vascular surgery due to severe peripheral vascular disease and worsening left foot wound    Active Hospital Problems    Diagnosis  POA    *PAD (peripheral artery disease) [I73.9]  Yes    Diabetic ulcer of left midfoot associated with type 2 diabetes mellitus [E11.621, L97.429]  Yes    Type 2 diabetes mellitus with kidney complication, with long-term current use of insulin [E11.29, Z79.4]  Not Applicable    ESRD (end stage renal disease) [N18.6]  Yes    Hypertension associated with diabetes [E11.59, I15.2]  Yes     Chronic      Resolved Hospital Problems   No resolved problems to display.         Plan:   Vascular surgery consulted-status post single-vessel runoff to left foot   Continue aspirin and Plavix  Consult wound care for wound VAC management  Consulted Infectious Disease  Continue current orders  Currently on p.o. doxycycline.  Further antibiotic changes as per Infectious Disease  Consult nephrology for dialysis  need  Sliding scale insulin, Accu-Cheks, hypoglycemia measures  Encourage p.o. nutrition, protein shakes    She can be discharged if cleared by consultants.      VTE Risk Mitigation (From admission, onward)           Ordered     enoxaparin injection 30 mg  Daily         10/17/22 2203     IP VTE HIGH RISK PATIENT  Once         10/17/22 2203     Place sequential compression device  Until discontinued         10/17/22 2203                        Department Hospital Medicine  [facility]  Pietro Shore MD  Date of service: 10/19/2022

## 2022-10-19 NOTE — PLAN OF CARE
Problem: Impaired Wound Healing  Goal: Optimal Wound Healing  Outcome: Ongoing, Progressing  Intervention: Promote Wound Healing  Flowsheets (Taken 10/19/2022 1536)  Oral Nutrition Promotion: calorie-dense liquids provided: vicky TID and glucerna TID meals

## 2022-10-19 NOTE — PROGRESS NOTES
10/19/22 1800   Vital Signs   Temp 98 °F (36.7 °C)   Temp src Oral   Pulse 69   Heart Rate Source Monitor   Resp 20   SpO2 96 %   Pulse Oximetry Type Intermittent   O2 Device (Oxygen Therapy) room air   BP (!) 109/50   BP Location Right arm   BP Method Automatic   Patient Position Lying        Hemodialysis AV Fistula Left upper arm   No Placement Date or Time found.   Present Prior to Hospital Arrival?: Yes  Location: Left upper arm   Site Assessment Clean;Dry;Intact;No redness;No swelling   Patency Present;Thrill;Bruit   Status Deaccessed   Flows Good   Dressing Status Clean;Dry;Intact   Site Condition No complications   Dressing Gauze   Post-Hemodialysis Assessment   Rinseback Volume (mL) 250 mL   Blood Volume Processed (Liters) 43.3 L   Dialyzer Clearance Lightly streaked   Duration of Treatment 148 minutes   Additional Fluid Intake (mL) 500 mL   Total UF (mL) 2517 mL   Net Fluid Removal 1767   Patient Response to Treatment Tolerated until the end of the treatemt. B/P dropped. 250 ml NS bolus administered.   Post-Treatment Weight 82.7 kg (182 lb 5.1 oz)   Treatment Weight Change -1.4   Arterial bleeding stop time (min) 5 min   Venous bleeding stop time (min) 5 min   Post-Hemodialysis Comments HD completed per orders and protocol. HD stopped 32 minutes early due to symptomatic drop in blood pressure. NS bolus administered. B/P gradually increased. Pt states feels better. Dr SYED aware of blood pressure issues. Pt eating off dinner tray/ talking on phone when leaving room. No change in patients status     ABI Gregg informed at 1835

## 2022-10-19 NOTE — CARE UPDATE
[phone]  Sonia confirmed that the patient is on service with them.

## 2022-10-19 NOTE — PROGRESS NOTES
[facility] Medicine  Progress Note    Patient name: Leanna García  MRN: [id number]  Admit Date: 10/17/2022   LOS: 0 days     SUBJECTIVE:     Principal problem: PAD (peripheral artery disease)    Interval History:  Patient was seen and examined bedside, denies any new symptoms, just came from cath lab.  No other acute events overnight as per nursing staff    Scheduled Meds:   amLODIPine  10 mg Oral Daily    aspirin  81 mg Oral Daily    atorvastatin  80 mg Oral Daily    azelastine  1 spray Nasal BID    calcium acetate(phosphat bind)  667 mg Oral TID WM    cetirizine  10 mg Oral Every other day    clopidogreL  75 mg Oral Daily    doxycycline monohydrate  1 tablet Oral BID    enoxaparin  30 mg Subcutaneous Daily    [START ON 10/19/2022] epoetin chris-epbx  50 Units/kg Subcutaneous Every Mon, Wed, Fri    fluticasone propionate  2 spray Each Nostril Daily    gabapentin  100 mg Oral BID    insulin detemir U-100  15 Units Subcutaneous BID    losartan  100 mg Oral Daily    minoxidiL  10 mg Oral BID    senna-docusate 8.6-50 mg  1 tablet Oral BID     Continuous Infusions:   lactated ringers 100 mL/hr at 10/17/22 2232     PRN Meds:acetaminophen, aspirin, clopidogreL, dextrose 10%, dextrose 10%, fentaNYL, glucagon (human recombinant), glucose, glucose, heparin (porcine), insulin aspart U-100, iodixanoL, LIDOcaine (PF) 10 mg/ml (1%), melatonin, midazolam, ondansetron, oxyCODONE, polyethylene glycol    Review of patient's allergies indicates:   Allergen Reactions    Dilaudid [hydromorphone] Nausea And Vomiting    Chlorhexidine Itching    Lortab [hydrocodone-acetaminophen] Itching       Review of Systems: As per interval history    OBJECTIVE:     Vital Signs (Most Recent)  Temp: 97.7 °F (36.5 °C) (10/18/22 0300)  Pulse: 64 (10/18/22 1116)  Resp: 20 (10/18/22 1116)  BP: 118/61 (10/18/22 1116)  SpO2: 97 % (10/18/22 1116)    Vital Signs Range (Last 24H):  Temp:  [97.7 °F (36.5 °C)-97.9 °F (36.6 °C)]   Pulse:   [59-79]   Resp:  [16-20]   BP: ()/(52-79)   SpO2:  [95 %-99 %]     I & O (Last 24H):No intake or output data in the 24 hours ending 10/18/22 1906    Physical Exam:  General: Patient resting comfortably in no acute distress. Appears as stated age. Calm  Eyes: No conjunctival injection. No scleral icterus.  ENT: Hearing grossly intact. No discharge from ears. No nasal discharge.   Neck: Supple, trachea midline. No JVD  CVS: RRR. No LE edema BL  Lungs:  No tachypnea or accessory muscle use.  Clear to auscultation bilaterally  Abdomen:  Soft, nontender and nondistended.  No organomegaly  Neuro: AOx3. Moves all extremities. Follows commands. Responds appropriately   Skin:  Left foot has dressing and wound VAC in place, right groin cath site has no hematoma or erythema    Laboratory:  I have reviewed all pertinent lab results within the past 24 hours.    Diagnostic Results:  Reviewed all imaging    ASSESSMENT/PLAN:     57-year-old with chronic left foot wound referred to emergency room by podiatrist and vascular surgery due to severe peripheral vascular disease and worsening left foot wound    Active Hospital Problems    Diagnosis  POA    *PAD (peripheral artery disease) [I73.9]  Yes    Diabetic ulcer of left midfoot associated with type 2 diabetes mellitus [E11.621, L97.429]  Yes    Type 2 diabetes mellitus with kidney complication, with long-term current use of insulin [E11.29, Z79.4]  Not Applicable    ESRD (end stage renal disease) [N18.6]  Yes    Hypertension associated with diabetes [E11.59, I15.2]  Yes     Chronic      Resolved Hospital Problems   No resolved problems to display.         Plan:   Vascular surgery consulted-status post single-vessel runoff to left foot via anterior tibial  Continue aspirin and Plavix  Consult wound care for wound VAC management  Continue current orders  Will discuss with Podiatry and vascular surgery for further course of action.  May have to involve Infectious Disease.   Currently on p.o. doxycycline  Sliding scale insulin, Accu-Cheks, hypoglycemia measures  Encourage p.o. nutrition, protein shakes      VTE Risk Mitigation (From admission, onward)           Ordered     heparin (porcine) injection  As needed (PRN)         10/18/22 0826     enoxaparin injection 30 mg  Daily         10/17/22 2203     IP VTE HIGH RISK PATIENT  Once         10/17/22 2203     Place sequential compression device  Until discontinued         10/17/22 2203                        Department Hospital Medicine  [facility]  Pietro Shore MD  Date of service: 10/18/2022

## 2022-10-19 NOTE — SUBJECTIVE & OBJECTIVE
Subjective:     Interval History: Patient resting in bed.  Reviewed op note from vascular.  Patient did have stenosis that was able to be reopened.  Discussed with patient the need to follow-up following discharge with vascular in order to guarantee that she is still taking her blood thinners as she is supposed to be doing so.  Patient states this time she will follow with vascular.    Wound VAC recently changed.    Follow-up For: Procedure(s) (LRB):  Angiogram Extremity Unilateral (Left)    Post-Operative Day: 1 Day Post-Op    Scheduled Meds:   sodium chloride 0.9%   Intravenous Once    amLODIPine  10 mg Oral Daily    aspirin  81 mg Oral Daily    atorvastatin  80 mg Oral Daily    azelastine  1 spray Nasal BID    calcium acetate(phosphat bind)  667 mg Oral TID WM    cetirizine  10 mg Oral Every other day    clopidogreL  75 mg Oral Daily    doxycycline monohydrate  1 tablet Oral BID    enoxaparin  30 mg Subcutaneous Daily    epoetin chris-epbx  50 Units/kg Subcutaneous Every Mon, Wed, Fri    fluticasone propionate  2 spray Each Nostril Daily    gabapentin  100 mg Oral BID    insulin detemir U-100  15 Units Subcutaneous BID    minoxidiL  10 mg Oral BID    mupirocin   Nasal BID    senna-docusate 8.6-50 mg  1 tablet Oral BID     Continuous Infusions:  PRN Meds:acetaminophen, dextrose 10%, dextrose 10%, glucagon (human recombinant), glucose, glucose, insulin aspart U-100, melatonin, ondansetron, oxyCODONE, polyethylene glycol, sodium chloride 0.9%    Review of Systems  Objective:     Vital Signs (Most Recent):  Temp: 98.1 °F (36.7 °C) (10/19/22 1145)  Pulse: 70 (10/19/22 1145)  Resp: 18 (10/19/22 1145)  BP: (!) 167/75 (10/19/22 1145)  SpO2: 97 % (10/19/22 1145)   Vital Signs (24h Range):  Temp:  [97 °F (36.1 °C)-98.6 °F (37 °C)] 98.1 °F (36.7 °C)  Pulse:  [60-73] 70  Resp:  [16-20] 18  SpO2:  [95 %-97 %] 97 %  BP: (120-167)/(60-75) 167/75     Weight: 84.1 kg (185 lb 6.5 oz)  Body mass index is 27.38 kg/m².    Foot  Exam                  Laboratory:  All pertinent labs reviewed within the last 24 hours.    Diagnostic Results:  I have reviewed all pertinent imaging results/findings within the past 24 hours.

## 2022-10-19 NOTE — CONSULTS
Consult Note  Infectious Disease    Reason for Consult:  left heel wound.    HPI: Leanna Gracía is a 57 y.o. female last seen 9/29 and seen numerous times at [facility] for bacteremia(Bacillus cereus) and complicated wound infections and osteomyelitis of the left foot, s/p more than 1 LTAC stay, multiple debridements and ultimately amputation of the left great toe. She was given 2 weeks of cefepime post surgery on 8/26 and the stitches were removed. The wound has healed. She is still receiving treatment for left heel eschar and left midfoot wound. On initial presentation in march her A1c was > 14% and after prolonged confinement, It improved to 7%, but her BS were rising again, in large part due to lack of understanding of diabetic diet.       While being followed at wound care, her left heel seemed to deteriorate and she had a wound culture obtained on 10/11 which grew MRSA. She began doxycycline on 10/15. She had mild mychal-ulcer erythema. She was admitted yesterday for vascular intervention and underwent PCI on left popliteal with good result. She reports that her blood sugars are improved.     Review of patient's allergies indicates:   Allergen Reactions    Dilaudid [hydromorphone] Nausea And Vomiting    Chlorhexidine Itching    Lortab [hydrocodone-acetaminophen] Itching     Past Medical History:   Diagnosis Date    A-fib     resolved per patient    Anemia due to end stage renal disease 6/30/2022    Arthritis     Bronchitis     Cardiovascular event risk, ASCVD 10-year risk 6.7% 10/15/2016    Diabetes mellitus type II     Diabetic foot infection     Diabetic ulcer of left midfoot 05/05/2022    Disorder of kidney and ureter     Encounter for blood transfusion     ESRD (end stage renal disease) 05/18/2018    MANJINDER (generalized anxiety disorder) 10/25/2016    History of colon polyps 11/02/2016    Hyperlipidemia     Hypertension     Stroke      Past Surgical History:   Procedure Laterality Date     ANGIOGRAPHY OF LOWER EXTREMITY Left 10/18/2022    Procedure: Angiogram Extremity Unilateral;  Surgeon: Ali Khoobehi, MD;  Location: [facility] CATH/EP LAB;  Service: Vascular;  Laterality: Left;    AV FISTULA PLACEMENT Left 8/29/2022    Procedure: CREATION, AV FISTULA;  Surgeon: Ali Khoobehi, MD;  Location: [facility] OR;  Service: Vascular;  Laterality: Left;    BONE BIOPSY Left 8/24/2022    Procedure: Biopsy-Bone;  Surgeon: Porfirio Ponce DPM;  Location: [facility] OR;  Service: Podiatry;  Laterality: Left;    COLONOSCOPY N/A 10/5/2016    Procedure: COLONOSCOPY;  Surgeon: Pillo Chanel MD;  Location: [facility] ENDO;  Service: Endoscopy;  Laterality: N/A;    COLONOSCOPY N/A 4/26/2022    Procedure: COLONOSCOPY;  Surgeon: Saravanan Rachel MD;  Location: [facility] ENDO;  Service: Endoscopy;  Laterality: N/A;    FISTULOGRAM Left 8/24/2022    Procedure: Fistulogram;  Surgeon: Ali Khoobehi, MD;  Location: [facility] CATH/EP LAB;  Service: Vascular;  Laterality: Left;    HERNIA REPAIR Bilateral 11/22/2016    inguinal    HYSTERECTOMY      INCISION AND DRAINAGE FOOT Left 5/13/2022    Procedure: INCISION AND DRAINAGE, FOOT;  Surgeon: Ricardo Bruno DPM;  Location: [facility];  Service: Podiatry;  Laterality: Left;    OOPHORECTOMY      THROMBECTOMY, AV FISTULA, UPPER EXTREMITY, PERCUTANEOUS  8/24/2022    Procedure: THROMBECTOMY, AV FISTULA, UPPER EXTREMITY, PERCUTANEOUS;  Surgeon: Ali Khoobehi, MD;  Location: [facility] CATH/EP LAB;  Service: Vascular;;    TOE AMPUTATION Left 8/26/2022    Procedure: AMPUTATION, TOE;  Surgeon: Porfirio Ponce DPM;  Location: [facility] OR;  Service: Podiatry;  Laterality: Left;     Social History     Socioeconomic History    Marital status:    Tobacco Use    Smoking status: Never    Smokeless tobacco: Never   Substance and Sexual Activity    Alcohol use: No    Drug use: No    Sexual activity: Yes     Partners: Male   Social History Narrative    Caregiver      Family History   Problem Relation Age  of Onset    Hyperlipidemia Mother     Hypertension Mother     Hypertension Father     Diabetes Father     Diabetes Sister     Diabetes Sister     Diabetes Maternal Aunt     Diabetes Maternal Grandmother     Heart disease Maternal Grandmother     Cancer Paternal Grandmother     Breast cancer Neg Hx     Colon cancer Neg Hx     Ovarian cancer Neg Hx           EXAM & DIAGNOSTICS REVIEWED:   Vitals:     Temp:  [97 °F (36.1 °C)-98.6 °F (37 °C)]   Temp: 98 °F (36.7 °C) (10/19/22 1430)  Pulse: 66 (10/19/22 1500)  Resp: 18 (10/19/22 1430)  BP: (!) 116/55 (10/19/22 1500)  SpO2: 97 % (10/19/22 1145)    Intake/Output Summary (Last 24 hours) at 10/19/2022 1528  Last data filed at 10/19/2022 0950  Gross per 24 hour   Intake 1260 ml   Output 0 ml   Net 1260 ml       General:  In NAD. Alert and attentive, cooperative, comfortable, undergoing dialysis  Eyes:  Anicteric, , EOMI     Musc:  Joints without effusion, swelling, erythema, synovitis,     Skin:  No rashes.    Neuro:             Alert, attentive, speech fluent, face symmetric, moves all extremities, no focal weakness.    Psych: Calm, cooperative   Extrem: No edema, erythema, phlebitis, cellulitis, warm and well perfused  VAD:       Isolation:    Wound:      Left heel has soft  eschar, beginning to separate around the edges. There is less erythema on exam than the photo suggests. There are no abscesses or expressible purulence.           General Labs reviewed:  Recent Labs   Lab 10/17/22  1801 10/18/22  0605 10/19/22  0501   WBC 9.12 6.63 7.87   HGB 11.7* 11.5* 11.1*   HCT 36.2* 36.0* 34.4*    268 276       Recent Labs   Lab 10/17/22  1801 10/18/22  0605 10/19/22  0501   * 134* 136   K 4.0 4.0 4.1   CL 90* 93* 95   CO2 35* 27 28   BUN 33* 40* 61*   CREATININE 4.8* 5.7* 7.1*   CALCIUM 9.3 9.2 9.3   PROT 8.7*  --   --    BILITOT 0.7  --   --    ALKPHOS 83  --   --    ALT 28  --   --    AST 24  --   --      No results for input(s): CRP in the last 168  hours.      Micro:10/11/22  METHICILLIN RESISTANT STAPHYLOCOCCUS AUREUS   Many      Resulting Agency [facility]        Susceptibility   Methicillin resistant Staphylococcus aureus     CULTURE, AEROBIC  (SPECIFY SOURCE)     Clindamycin <=0.5 mcg/mL Sensitive     Erythromycin <=0.5 mcg/mL Sensitive     Oxacillin >2 mcg/mL Resistant     Penicillin 0.12 mcg/mL Resistant     Tetracycline <=4 mcg/mL Sensitive     Trimeth/Sulfa <=0.5/9.5 m... Sensitive     Vancomycin 1 mcg/mL Sensitive               Microbiology Results (last 7 days)       ** No results found for the last 168 hours. **            Imaging Reviewed:        Cardiology:    IMPRESSION & PLAN   1. Diabetes with vasculopathy   A1c 7.2%  2. Peripheral arterial disease with PCI left popliteal  3. Decubitus left heel, slow to resolved, colonization vs infection with MRSA  4. ESRD on dialysis      Recommendations:  Ok to continue doxycycline for 14 days     F/u per Dr. Bruno    Medical Decision Making during this encounter was  [_] Low Complexity  [_] Moderate Complexity  [  ] High Complexity

## 2022-10-19 NOTE — PROGRESS NOTES
[facility]  Podiatry  Progress Note    Patient Name: Leanna García  MRN: [id number]  Admission Date: 10/17/2022  Hospital Length of Stay: 1 days  Attending Physician: Pietro Shore MD  Primary Care Provider: Stefano Lopez MD     Subjective:     Interval History: Patient resting in bed.  Reviewed op note from vascular.  Patient did have stenosis that was able to be reopened.  Discussed with patient the need to follow-up following discharge with vascular in order to guarantee that she is still taking her blood thinners as she is supposed to be doing so.  Patient states this time she will follow with vascular.    Wound VAC recently changed.    Follow-up For: Procedure(s) (LRB):  Angiogram Extremity Unilateral (Left)    Post-Operative Day: 1 Day Post-Op    Scheduled Meds:   sodium chloride 0.9%   Intravenous Once    amLODIPine  10 mg Oral Daily    aspirin  81 mg Oral Daily    atorvastatin  80 mg Oral Daily    azelastine  1 spray Nasal BID    calcium acetate(phosphat bind)  667 mg Oral TID WM    cetirizine  10 mg Oral Every other day    clopidogreL  75 mg Oral Daily    doxycycline monohydrate  1 tablet Oral BID    enoxaparin  30 mg Subcutaneous Daily    epoetin chris-epbx  50 Units/kg Subcutaneous Every Mon, Wed, Fri    fluticasone propionate  2 spray Each Nostril Daily    gabapentin  100 mg Oral BID    insulin detemir U-100  15 Units Subcutaneous BID    minoxidiL  10 mg Oral BID    mupirocin   Nasal BID    senna-docusate 8.6-50 mg  1 tablet Oral BID     Continuous Infusions:  PRN Meds:acetaminophen, dextrose 10%, dextrose 10%, glucagon (human recombinant), glucose, glucose, insulin aspart U-100, melatonin, ondansetron, oxyCODONE, polyethylene glycol, sodium chloride 0.9%    Review of Systems  Objective:     Vital Signs (Most Recent):  Temp: 98.1 °F (36.7 °C) (10/19/22 1145)  Pulse: 70 (10/19/22 1145)  Resp: 18 (10/19/22 1145)  BP: (!) 167/75 (10/19/22 1145)  SpO2: 97 % (10/19/22  1145)   Vital Signs (24h Range):  Temp:  [97 °F (36.1 °C)-98.6 °F (37 °C)] 98.1 °F (36.7 °C)  Pulse:  [60-73] 70  Resp:  [16-20] 18  SpO2:  [95 %-97 %] 97 %  BP: (120-167)/(60-75) 167/75     Weight: 84.1 kg (185 lb 6.5 oz)  Body mass index is 27.38 kg/m².    Foot Exam                  Laboratory:  All pertinent labs reviewed within the last 24 hours.    Diagnostic Results:  I have reviewed all pertinent imaging results/findings within the past 24 hours.    Assessment/Plan:     Diabetic ulcer of left midfoot associated with type 2 diabetes mellitus  Patient can be discharged from Podiatry standpoint and follow up outpatient in wound clinic    Infectious Disease consulted.  Recent culture results in chart from left heel wound.  Left heel wound has signs of erythema surrounding wound.      Continue wound VAC to left mid foot wound twice weekly changes    Continue Betadine painting to left heel wound with additional padding and no compression once daily    Protein drinks and Wilman ordered    Patient needs to float heels at all times while lying in bed.  Heel pillow boots as well          Alivia Bruno DPM  Podiatry  Critical access hospital

## 2022-10-19 NOTE — CONSULTS
Nephrology Consult Note        Patient Name: Leanna García  MRN: [id number]    Patient Class: IP- Inpatient   Admission Date: 10/17/2022  Length of Stay: 1 days  Date of Service: 10/19/2022    Attending Physician: Pietro Shore MD  Primary Care Provider: Stefano Lopez MD    Reason for Consult: esrd/anemia/htn/shpt    SUBJECTIVE:     HPI: 57F with ESRD on HD was sent to hospital by Podiatry for worsening LLE wound. Had angiogram by Dr. Khoobehi and angioplasty on 10/18. Doing well currently, no particular complains. Last HD on Mon, due  for HD today.    HD today.    Past Medical History:   Diagnosis Date    A-fib     resolved per patient    Anemia due to end stage renal disease 6/30/2022    Arthritis     Bronchitis     Cardiovascular event risk, ASCVD 10-year risk 6.7% 10/15/2016    Diabetes mellitus type II     Diabetic foot infection     Diabetic ulcer of left midfoot 05/05/2022    Disorder of kidney and ureter     Encounter for blood transfusion     ESRD (end stage renal disease) 05/18/2018    MANJINDER (generalized anxiety disorder) 10/25/2016    History of colon polyps 11/02/2016    Hyperlipidemia     Hypertension     Stroke      Past Surgical History:   Procedure Laterality Date    ANGIOGRAPHY OF LOWER EXTREMITY Left 10/18/2022    Procedure: Angiogram Extremity Unilateral;  Surgeon: Ali Khoobehi, MD;  Location: [facility] CATH/EP LAB;  Service: Vascular;  Laterality: Left;    AV FISTULA PLACEMENT Left 8/29/2022    Procedure: CREATION, AV FISTULA;  Surgeon: Ali Khoobehi, MD;  Location: [facility] OR;  Service: Vascular;  Laterality: Left;    BONE BIOPSY Left 8/24/2022    Procedure: Biopsy-Bone;  Surgeon: Porfirio Ponce DPM;  Location: [facility] OR;  Service: Podiatry;  Laterality: Left;    COLONOSCOPY N/A 10/5/2016    Procedure: COLONOSCOPY;  Surgeon: Pillo Chanel MD;  Location: [facility];  Service: Endoscopy;  Laterality: N/A;    COLONOSCOPY N/A 4/26/2022    Procedure: COLONOSCOPY;  Surgeon: Saravanan Rachel MD;   Location: [facility] ENDO;  Service: Endoscopy;  Laterality: N/A;    FISTULOGRAM Left 8/24/2022    Procedure: Fistulogram;  Surgeon: Ali Khoobehi, MD;  Location: [facility] CATH/EP LAB;  Service: Vascular;  Laterality: Left;    HERNIA REPAIR Bilateral 11/22/2016    inguinal    HYSTERECTOMY      INCISION AND DRAINAGE FOOT Left 5/13/2022    Procedure: INCISION AND DRAINAGE, FOOT;  Surgeon: Ricardo Bruno DPM;  Location: [facility] OR;  Service: Podiatry;  Laterality: Left;    OOPHORECTOMY      THROMBECTOMY, AV FISTULA, UPPER EXTREMITY, PERCUTANEOUS  8/24/2022    Procedure: THROMBECTOMY, AV FISTULA, UPPER EXTREMITY, PERCUTANEOUS;  Surgeon: Ali Khoobehi, MD;  Location: [facility] CATH/EP LAB;  Service: Vascular;;    TOE AMPUTATION Left 8/26/2022    Procedure: AMPUTATION, TOE;  Surgeon: Porfirio Ponce DPM;  Location: [facility] OR;  Service: Podiatry;  Laterality: Left;     Family History   Problem Relation Age of Onset    Hyperlipidemia Mother     Hypertension Mother     Hypertension Father     Diabetes Father     Diabetes Sister     Diabetes Sister     Diabetes Maternal Aunt     Diabetes Maternal Grandmother     Heart disease Maternal Grandmother     Cancer Paternal Grandmother     Breast cancer Neg Hx     Colon cancer Neg Hx     Ovarian cancer Neg Hx      Social History     Tobacco Use    Smoking status: Never    Smokeless tobacco: Never   Substance Use Topics    Alcohol use: No    Drug use: No       Review of patient's allergies indicates:   Allergen Reactions    Dilaudid [hydromorphone] Nausea And Vomiting    Chlorhexidine Itching    Lortab [hydrocodone-acetaminophen] Itching       Outpatient meds:  No current facility-administered medications on file prior to encounter.     Current Outpatient Medications on File Prior to Encounter   Medication Sig Dispense Refill    amLODIPine (NORVASC) 10 MG tablet Take 1 tablet (10 mg total) by mouth once daily.      aspirin (ECOTRIN) 81 MG EC tablet Take 81 mg by mouth once daily.      atorvastatin  (LIPITOR) 80 MG tablet Take 1 tablet (80 mg total) by mouth once daily. 90 tablet 3    azelastine (ASTELIN) 137 mcg (0.1 %) nasal spray 1 spray (137 mcg total) by Nasal route 2 (two) times a day. 30 mL 2    calcium acetate,phosphat bind, (PHOSLO) 667 mg capsule Take 1 capsule (667 mg total) by mouth 3 (three) times daily with meals.      cetirizine (ZYRTEC) 10 MG tablet Take 1 tablet (10 mg total) by mouth every other day. 45 tablet 3    clopidogreL (PLAVIX) 75 mg tablet Take 1 tablet (75 mg total) by mouth once daily. 90 tablet 3    doxycycline (MONODOX) 100 MG capsule Take 1 capsule (100 mg total) by mouth 2 (two) times daily. for 14 days 28 capsule 0    fluticasone propionate (FLONASE) 50 mcg/actuation nasal spray 2 sprays (100 mcg total) by Each Nostril route once daily. 16 g 3    gabapentin (NEURONTIN) 100 MG capsule Take 1 capsule (100 mg total) by mouth 2 (two) times daily. 180 capsule 3    insulin aspart U-100 (NOVOLOG FLEXPEN U-100 INSULIN) 100 unit/mL (3 mL) InPn pen Inject 5 Units into the skin 3 (three) times daily with meals. 3 mL 6    insulin detemir U-100 (LEVEMIR FLEXTOUCH U-100 INSULN) 100 unit/mL (3 mL) InPn pen Inject 15 Units into the skin 2 (two) times daily. 27 mL 3    losartan (COZAAR) 100 MG tablet Take 1 tablet (100 mg total) by mouth once daily.      minoxidiL (LONITEN) 10 MG Tab Take 1 tablet by mouth 2 (two) times a day.      ondansetron (ZOFRAN-ODT) 8 MG TbDL Take 1 tablet (8 mg total) by mouth every 8 (eight) hours as needed (nausea).      oxyCODONE (ROXICODONE) 5 MG immediate release tablet Take 1 tablet (5 mg total) by mouth every 6 (six) hours as needed for Pain.      blood sugar diagnostic Strp To check BG 4 times daily, to use with insurance preferred meter 450 strip 3    epoetin chris-epbx (RETACRIT) 4,000 unit/mL injection Inject 1.11 mLs (4,440 Units total) into the skin every Mon, Wed, Fri.      lactulose (CHRONULAC) 10 gram/15 mL solution Take 20 g by mouth 2 (two) times a day.  "     pen needle, diabetic (BD ULTRA-FINE ROBERT PEN NEEDLE) 32 gauge x 5/32" Ndle To use 4 times per day with insulin injections. 450 each 2    polyethylene glycol (GLYCOLAX) 17 gram PwPk Take 17 g by mouth 2 (two) times daily as needed.      senna-docusate 8.6-50 mg (PERICOLACE) 8.6-50 mg per tablet Take 1 tablet by mouth 2 (two) times daily.      [DISCONTINUED] blood-glucose meter (ACCU-CHEK KAYLIE PLUS METER) Misc To use to check blood sugars 4 times a day 1 each 0    [DISCONTINUED] clorazepate (TRANXENE) 3.75 MG Tab Take 7.5 mg by mouth nightly as needed.       [DISCONTINUED] dextrose (GLUCOSE GEL) 40 % gel Take 37.5 mLs (15,000 mg total) by mouth once as needed (hypoglycemia). 37.5 g 4    [DISCONTINUED] ezetimibe (ZETIA) 10 mg tablet Take 1 tablet (10 mg total) by mouth once daily.      [DISCONTINUED] fenofibrate 160 MG Tab Take 1 tablet (160 mg total) by mouth once daily. 90 tablet 3    [DISCONTINUED] lancing device with lancets (ACCU-CHEK SOFT DEV LANCETS) Kit To check blood sugars 4 times per day 400 each 3    [DISCONTINUED] pantoprazole (PROTONIX) 40 MG tablet Take 1 tablet (40 mg total) by mouth once daily.         Scheduled meds:   sodium chloride 0.9%   Intravenous Once    amLODIPine  10 mg Oral Daily    aspirin  81 mg Oral Daily    atorvastatin  80 mg Oral Daily    azelastine  1 spray Nasal BID    calcium acetate(phosphat bind)  667 mg Oral TID WM    cetirizine  10 mg Oral Every other day    clopidogreL  75 mg Oral Daily    doxycycline monohydrate  1 tablet Oral BID    enoxaparin  30 mg Subcutaneous Daily    epoetin chris-epbx  50 Units/kg Subcutaneous Every Mon, Wed, Fri    fluticasone propionate  2 spray Each Nostril Daily    gabapentin  100 mg Oral BID    insulin detemir U-100  15 Units Subcutaneous BID    minoxidiL  10 mg Oral BID    mupirocin   Nasal BID    senna-docusate 8.6-50 mg  1 tablet Oral BID       Infusions:      PRN meds:  acetaminophen, dextrose 10%, dextrose 10%, glucagon (human " recombinant), glucose, glucose, insulin aspart U-100, melatonin, ondansetron, oxyCODONE, polyethylene glycol, sodium chloride 0.9%    Review of Systems:  Review of Systems   Constitutional:  Negative for chills, fever, malaise/fatigue and weight loss.   HENT:  Negative for hearing loss and nosebleeds.    Eyes:  Negative for blurred vision, double vision and photophobia.   Respiratory:  Negative for cough, shortness of breath and wheezing.    Cardiovascular:  Negative for chest pain, palpitations and leg swelling.   Gastrointestinal:  Negative for abdominal pain, constipation, diarrhea, heartburn, nausea and vomiting.   Genitourinary:  Negative for dysuria, frequency and urgency.   Musculoskeletal:  Negative for falls, joint pain and myalgias.   Skin:  Negative for itching and rash.   Neurological:  Negative for dizziness, speech change, focal weakness, loss of consciousness and headaches.   Endo/Heme/Allergies:  Does not bruise/bleed easily.   Psychiatric/Behavioral:  Negative for depression and substance abuse. The patient is not nervous/anxious.      OBJECTIVE:     Vital Signs and IO:  Temp:  [97 °F (36.1 °C)-98.6 °F (37 °C)]   Pulse:  [60-76]   Resp:  [16-20]   BP: (120-167)/(60-76)   SpO2:  [95 %-97 %]   I/O last 3 completed shifts:  In: 1000 [I.V.:1000]  Out: -   Wt Readings from Last 5 Encounters:   10/17/22 84.1 kg (185 lb 6.5 oz)   09/27/22 79.4 kg (175 lb)   09/15/22 83.7 kg (184 lb 8.4 oz)   09/13/22 (P) 79.4 kg (175 lb)   09/06/22 79.4 kg (175 lb)     Body mass index is 27.38 kg/m².    Physical Exam  Constitutional:       General: She is not in acute distress.     Appearance: She is well-developed. She is not diaphoretic.   HENT:      Head: Normocephalic and atraumatic.      Mouth/Throat:      Mouth: Mucous membranes are moist.   Eyes:      General: No scleral icterus.     Pupils: Pupils are equal, round, and reactive to light.   Cardiovascular:      Rate and Rhythm: Normal rate and regular rhythm.    Pulmonary:      Effort: Pulmonary effort is normal. No respiratory distress.      Breath sounds: No stridor.   Abdominal:      General: There is no distension.      Palpations: Abdomen is soft.   Musculoskeletal:         General: No deformity. Normal range of motion.      Cervical back: Neck supple.   Skin:     General: Skin is warm and dry.      Findings: No erythema or rash.   Neurological:      Mental Status: She is alert and oriented to person, place, and time.      Cranial Nerves: No cranial nerve deficit.   Psychiatric:         Behavior: Behavior normal.       Laboratory:  Recent Labs   Lab 10/17/22  1801 10/18/22  0605 10/19/22  0501   * 134* 136   K 4.0 4.0 4.1   CL 90* 93* 95   CO2 35* 27 28   BUN 33* 40* 61*   CREATININE 4.8* 5.7* 7.1*   * 106 68*       Recent Labs   Lab 10/17/22  1801 10/18/22  0605 10/19/22  0501   CALCIUM 9.3 9.2 9.3   ALBUMIN 4.2 3.5 3.6   PHOS  --  6.7* 7.8*       Recent Labs   Lab 01/30/20  0705 03/10/22  0650   PTH, Intact 466.0 H 387.0 H       No results for input(s): POCTGLUCOSE in the last 168 hours.    Recent Labs   Lab 05/29/22  1324 06/22/22  0618 10/17/22  1801   Hemoglobin A1C 8.3 H 7.5 H 7.2 H       Recent Labs   Lab 10/17/22  1801 10/18/22  0605 10/19/22  0501   WBC 9.12 6.63 7.87   HGB 11.7* 11.5* 11.1*   HCT 36.2* 36.0* 34.4*    268 276   MCV 86 89 87   MCHC 32.3 31.9* 32.3   MONO 8.3  0.8  --   --        Recent Labs   Lab 10/17/22  1801 10/18/22  0605 10/19/22  0501   BILITOT 0.7  --   --    PROT 8.7*  --   --    ALBUMIN 4.2 3.5 3.6   ALKPHOS 83  --   --    ALT 28  --   --    AST 24  --   --        Recent Labs   Lab 05/29/22  0900 06/21/22  1612   Color, UA Yellow Yellow   Appearance, UA Clear Clear   pH, UA 8.0 >8.0 A   Specific [place], UA 1.015 1.010   Protein, UA 2+ A 3+ A   Glucose, UA 2+ A 2+ A   Ketones, UA Negative Negative   Urobilinogen, UA Negative Negative   Bilirubin (UA) Negative Negative   Occult Blood UA Negative Negative    Nitrite, UA Negative Negative   RBC, UA 1 1   WBC, UA 5 2   Bacteria Rare Negative   Hyaline Casts, UA 0 12 A       Recent Labs   Lab 05/19/21  0300   POC PH 7.273 L   POC PCO2 47.8 H   POC HCO3 22.1 L   POC PO2 22 LL   POC SATURATED O2 30 L   POC BE -5   Sample VENOUS       Microbiology Results (last 7 days)       ** No results found for the last 168 hours. **            ASSESSMENT/PLAN:     ESRD on HD MWF via AVF  Continue current dialysis prescription.  Next HD per schedule.  Renal diet - low K, low phos.  No IVs or BP checks on access and/or non-dominant arm.    Anemia of CKD  Hgb and HCT are acceptable. Monitor for now.  Will provide FERMÍN and/or IV iron PRN.    MBD / Secondary HPT  Ca, Phos, PTH and vitamin D levels are acceptable.   Phos binders, vitamin D and analogues, calcimimetics will be given as needed.    HTN  BP seem controlled.   Tolerate asymptomatic HTN up to -160.  Continue home meds.  Low sodium diet.    LLE wound  S/p angioplasty 10/18 by Dr. Khoobehi  Appreciate Podiatry, Vascualr and ID input    Thank you for allowing us to participate in the care of your patient!   We will follow the patient and provide recommendations as needed.    Patient care time was spent personally by me on the following activities:     Obtaining a history.  Examination of patient.  Providing medical care at the patients bedside.  Developing a treatment plan with patient or surrogate and bedside caregivers.  Ordering and reviewing laboratory studies, radiographic studies, pulse oximetry.  Ordering and performing treatments and interventions.  Evaluation of patient's response to treatment.  Discussions with consultants while on the unit and immediately available to the patient.  Re-evaluation of the patient's condition.  Documentation in the medical record.     Bryce Craig MD    [facility]  [address] [place] [address]  [place] [address]    [phone] - tel  [phone] - fax    10/19/2022

## 2022-10-20 ENCOUNTER — TELEPHONE (OUTPATIENT)
Dept: FAMILY MEDICINE | Facility: CLINIC | Age: 57
End: 2022-10-20
Payer: MEDICARE

## 2022-10-20 VITALS
BODY MASS INDEX: 27.46 KG/M2 | TEMPERATURE: 97 F | RESPIRATION RATE: 16 BRPM | WEIGHT: 185.44 LBS | SYSTOLIC BLOOD PRESSURE: 121 MMHG | HEIGHT: 69 IN | DIASTOLIC BLOOD PRESSURE: 63 MMHG | HEART RATE: 74 BPM | OXYGEN SATURATION: 98 %

## 2022-10-20 LAB
ALBUMIN SERPL BCP-MCNC: 3.4 G/DL (ref 3.5–5.2)
ANION GAP SERPL CALC-SCNC: 11 MMOL/L (ref 8–16)
BUN SERPL-MCNC: 43 MG/DL (ref 6–20)
CALCIUM SERPL-MCNC: 9.5 MG/DL (ref 8.7–10.5)
CHLORIDE SERPL-SCNC: 105 MMOL/L (ref 95–110)
CO2 SERPL-SCNC: 23 MMOL/L (ref 23–29)
CREAT SERPL-MCNC: 5.9 MG/DL (ref 0.5–1.4)
EST. GFR  (NO RACE VARIABLE): 7.8 ML/MIN/1.73 M^2
GLUCOSE SERPL-MCNC: 119 MG/DL (ref 70–110)
GLUCOSE SERPL-MCNC: 120 MG/DL (ref 70–110)
MAGNESIUM SERPL-MCNC: 2.1 MG/DL (ref 1.6–2.6)
PHOSPHATE SERPL-MCNC: 6.5 MG/DL (ref 2.7–4.5)
POTASSIUM SERPL-SCNC: 4.7 MMOL/L (ref 3.5–5.1)
SODIUM SERPL-SCNC: 139 MMOL/L (ref 136–145)

## 2022-10-20 PROCEDURE — 83735 ASSAY OF MAGNESIUM: CPT | Performed by: HOSPITALIST

## 2022-10-20 PROCEDURE — 36415 COLL VENOUS BLD VENIPUNCTURE: CPT | Performed by: INTERNAL MEDICINE

## 2022-10-20 PROCEDURE — 80069 RENAL FUNCTION PANEL: CPT | Performed by: INTERNAL MEDICINE

## 2022-10-20 RX ORDER — DOXYCYCLINE 100 MG/1
100 CAPSULE ORAL 2 TIMES DAILY
Qty: 28 CAPSULE | Refills: 0 | Status: ON HOLD | OUTPATIENT
Start: 2022-10-20 | End: 2022-11-04 | Stop reason: HOSPADM

## 2022-10-20 NOTE — HOSPITAL COURSE
57-year-old with chronic left foot wound referred to emergency room by podiatrist and vascular surgery due to severe peripheral vascular disease and worsening left foot wound.  Unfortunately patient was not taking any antiplatelets, she was evaluated by Infectious Disease, Podiatry and vascular surgery, she underwent single-vessel runoff and was found to have acceptable circulation.  She was cleared for discharge by multiple consultants, Infectious Disease recommended 14 more days of doxycycline.  She already has wound VAC on left foot.  Home health services were resumed.  Surprisingly patient was not requiring much insulin or antihypertensives.  We changed her insulin regimen as well as antihypertensive regimen and educated patient and .  She was advised to follow-up with her PCP and other outpatient providers.    I have seen the patient on the day of discharge and reviewed the discharge instructions as outlined below. Patient verbalized understanding and is aware to contact primary care physician or return to ED if new or worsening symptoms.

## 2022-10-20 NOTE — DISCHARGE SUMMARY
[facility] Medicine  Discharge Summary      Patient Name: Leanna García  MRN: [id number]  Patient Class: IP- Inpatient  Admission Date: 10/17/2022  Hospital Length of Stay: 2 days  Discharge Date and Time: 10/20/2022 11:05 AM  Attending Physician: Precious att. providers found   Discharging Provider: Pietro Shore MD  Primary Care Provider: Stefano Lopez MD      HPI:   57-year-old female with history of DM 2; ESRD on RRT; PAF, HLD, CAD, HTN, CVA, Non-healing DM foot ulcer was sent to ED for admission for angiography of lower extremities with possible surgical intervention for non-healing ulcer. She was recently in hospital for same. She has had osteomyelitis (bone biopsy grew Proteus) with eventual amputation of the 1st toe and partial 1st metatarsal head amputation on 8/26. This was followed by 2 weeks of cefepime as outpatient. Patient was discharged on clopidogrel, but reportedly did not start until a fortnight or so post discharge when she saw Podiatry for follow-up. She has since begun antiplatelet therapy. However, wound is not healing. Is for angiogram in AM with possible intervention pending angiography results. Denied chest discomfort. No SOB.     In ED: Labs reviewed: no leukocytosis with minimal normocytic anemia; trivial hyponatremia with end stage renal dysfunction. COVID negative. CXR reviewed: NAPD. EKG reviewed: sinus, left axis, no acute segments.    Discussed with ED MD: Inpatient admission to med/tele; continue home regimen for DM, ESRD, HTN, HLD and PAF; Nephrology for RRT; Vascular consult; Podiatry consult; Wound care consult; Insulin sliding scale      Procedure(s) (LRB):  Angiogram Extremity Unilateral (Left)      Hospital Course:   57-year-old with chronic left foot wound referred to emergency room by podiatrist and vascular surgery due to severe peripheral vascular disease and worsening left foot wound.  Unfortunately patient was not taking any antiplatelets, she  was evaluated by Infectious Disease, Podiatry and vascular surgery, she underwent single-vessel runoff and was found to have acceptable circulation.  She was cleared for discharge by multiple consultants, Infectious Disease recommended 14 more days of doxycycline.  She already has wound VAC on left foot.  Home health services were resumed.  Surprisingly patient was not requiring much insulin or antihypertensives.  We changed her insulin regimen as well as antihypertensive regimen and educated patient and .  She was advised to follow-up with her PCP and other outpatient providers.    I have seen the patient on the day of discharge and reviewed the discharge instructions as outlined below. Patient verbalized understanding and is aware to contact primary care physician or return to ED if new or worsening symptoms.        Goals of Care Treatment Preferences:  Code Status: Full Code      Consults:   Consults (From admission, onward)        Status Ordering Provider     Inpatient consult to Nephrology  Once        Provider:  Bryce Craig MD    Completed PRIMO MENDEZ     Inpatient consult to Infectious Diseases  Once        Provider:  Janki Bailon MD    Completed ELIZABETH GREGORIO     Inpatient consult to Vascular Surgery  Once        Provider:  Ali Khoobehi, MD    Completed EDMOND HANNAH          Diabetic ulcer of left midfoot associated with type 2 diabetes mellitus  Patient's FSGs are controlled on current medication regimen.  Last A1c reviewed-   Lab Results   Component Value Date    LABA1C 10.3 (H) 09/11/2015    HGBA1C 7.5 (H) 06/22/2022     Most recent fingerstick glucose reviewed- No results for input(s): POCTGLUCOSE in the last 24 hours.  Current correctional scale  Medium  Maintain anti-hyperglycemic dose as follows-   Antihyperglycemics (From admission, onward)    None        Hold Oral hypoglycemics while patient is in the hospital.    Inpatient admission to med/tele; continue home  regimen for DM, ESRD, HTN, HLD and PAF; Nephrology for RRT; Vascular consult; Podiatry consult; Wound care consult; Insulin sliding scale      Final Active Diagnoses:    Diagnosis Date Noted POA    PRINCIPAL PROBLEM:  PAD (peripheral artery disease) [I73.9] 06/27/2022 Yes    Pressure injury of left heel, stage 3 [L89.623] 10/19/2022 Yes    Diabetic ulcer of left midfoot associated with type 2 diabetes mellitus [E11.621, L97.429]  Yes    Type 2 diabetes mellitus with kidney complication, with long-term current use of insulin [E11.29, Z79.4] 06/25/2020 Not Applicable    ESRD (end stage renal disease) [N18.6] 05/18/2018 Yes    Hypertension associated with diabetes [E11.59, I15.2] 06/13/2013 Yes     Chronic      Problems Resolved During this Admission:       Discharged Condition: good    [place] [facility]    [place]:   Follow-up Information     Stefano Lopez MD. Go in 1 week(s).    Specialty: Family Medicine  Why: Appointment scheduled October 31,2022 at 10:40AM.  Contact information:  [address] [facility] [address]  [phone]             Alivia Bruno DPM Follow up in 2 week(s).    Specialties: Podiatry, Surgery, Wound Care  Contact information:  [address] [facility]  [address]  [facility]  [facility] [address]  [phone]                       Patient Instructions:      Ambulatory referral/consult to Home Health   Standing Status: Future   Referral Priority: Routine Referral Type: Home Health   Referral Reason: Specialty Services Required   Requested Specialty: Home Health Services   Number of Visits Requested: 1     Diet Cardiac     Notify your health care provider if you experience any of the following:  temperature >100.4     Activity as tolerated       Significant Diagnostic Studies: Labs: All labs within the past 24 hours have been reviewed    Pending Diagnostic Studies:     None         Medications:  Reconciled Home Medications:      Medication List      CONTINUE taking these  medications    amLODIPine 10 MG tablet  Commonly known as: NORVASC  Take 1 tablet (10 mg total) by mouth once daily.     aspirin 81 MG EC tablet  Commonly known as: ECOTRIN  Take 81 mg by mouth once daily.     atorvastatin 80 MG tablet  Commonly known as: LIPITOR  Take 1 tablet (80 mg total) by mouth once daily.     azelastine 137 mcg (0.1 %) nasal spray  Commonly known as: ASTELIN  1 spray (137 mcg total) by Nasal route 2 (two) times a day.     blood sugar diagnostic Strp  To check BG 4 times daily, to use with insurance preferred meter     calcium acetate(phosphat bind) 667 mg capsule  Commonly known as: PHOSLO  Take 1 capsule (667 mg total) by mouth 3 (three) times daily with meals.     cetirizine 10 MG tablet  Commonly known as: ZYRTEC  Take 1 tablet (10 mg total) by mouth every other day.     clopidogreL 75 mg tablet  Commonly known as: PLAVIX  Take 1 tablet (75 mg total) by mouth once daily.     doxycycline 100 MG capsule  Commonly known as: MONODOX  Take 1 capsule (100 mg total) by mouth 2 (two) times daily. for 14 days     epoetin chris-epbx 4,000 unit/mL injection  Commonly known as: RETACRIT  Inject 1.11 mLs (4,440 Units total) into the skin every Mon, Wed, Fri.     fluticasone propionate 50 mcg/actuation nasal spray  Commonly known as: FLONASE  2 sprays (100 mcg total) by Each Nostril route once daily.     gabapentin 100 MG capsule  Commonly known as: NEURONTIN  Take 1 capsule (100 mg total) by mouth 2 (two) times daily.     insulin aspart U-100 100 unit/mL (3 mL) Inpn pen  Commonly known as: NovoLOG Flexpen U-100 Insulin  Inject 5 Units into the skin 3 (three) times daily with meals.     lactulose 10 gram/15 mL solution  Commonly known as: CHRONULAC  Take 20 g by mouth 2 (two) times a day.     ondansetron 8 MG Tbdl  Commonly known as: ZOFRAN-ODT  Take 1 tablet (8 mg total) by mouth every 8 (eight) hours as needed (nausea).     oxyCODONE 5 MG immediate release tablet  Commonly known as: ROXICODONE  Take 1  "tablet (5 mg total) by mouth every 6 (six) hours as needed for Pain.     pen needle, diabetic 32 gauge x 5/32" Ndle  Commonly known as: BD ULTRA-FINE ROBERT PEN NEEDLE  To use 4 times per day with insulin injections.     polyethylene glycol 17 gram Pwpk  Commonly known as: GLYCOLAX  Take 17 g by mouth 2 (two) times daily as needed.     senna-docusate 8.6-50 mg 8.6-50 mg per tablet  Commonly known as: PERICOLACE  Take 1 tablet by mouth 2 (two) times daily.        STOP taking these medications    LEVEMIR FLEXTOUCH U-100 INSULN 100 unit/mL (3 mL) Inpn pen  Generic drug: insulin detemir U-100     losartan 100 MG tablet  Commonly known as: COZAAR     minoxidiL 10 MG Tab  Commonly known as: LONITEN            Indwelling Lines/Drains at time of discharge:   Lines/Drains/Airways     Drain  Duration                Hemodialysis AV Fistula Left upper arm -- days                Time spent on the discharge of patient: 38 minutes         Pietro Shore MD  Department of Hospital Medicine  Counts include 234 beds at the [facility]  "

## 2022-10-20 NOTE — CARE UPDATE
CM sent secure chat to Betsey Ritter with [facility] to schedule patient discharge f/u, and received secure chat from Kaylie Diaz stating patient was scheduled 10/31/22 at 10:40am.

## 2022-10-20 NOTE — PLAN OF CARE
Problem: Adult Inpatient Plan of Care  Goal: Plan of Care Review  Outcome: Ongoing, Progressing  Goal: Patient-Specific Goal (Individualized)  Outcome: Ongoing, Progressing  Goal: Absence of Hospital-Acquired Illness or Injury  Outcome: Ongoing, Progressing  Goal: Optimal Comfort and Wellbeing  Outcome: Ongoing, Progressing  Goal: Readiness for Transition of Care  Outcome: Ongoing, Progressing     Problem: Diabetes Comorbidity  Goal: Blood Glucose Level Within Targeted Range  Outcome: Ongoing, Progressing     Problem: Infection  Goal: Absence of Infection Signs and Symptoms  Outcome: Ongoing, Progressing     Problem: Impaired Wound Healing  Goal: Optimal Wound Healing  Outcome: Ongoing, Progressing     Problem: Device-Related Complication Risk (Hemodialysis)  Goal: Safe, Effective Therapy Delivery  Outcome: Ongoing, Progressing     Problem: Hemodynamic Instability (Hemodialysis)  Goal: Effective Tissue Perfusion  Outcome: Ongoing, Progressing     Problem: Infection (Hemodialysis)  Goal: Absence of Infection Signs and Symptoms  Outcome: Ongoing, Progressing     Problem: Fall Injury Risk  Goal: Absence of Fall and Fall-Related Injury  Outcome: Ongoing, Progressing     Problem: Skin Injury Risk Increased  Goal: Skin Health and Integrity  Outcome: Ongoing, Progressing

## 2022-10-20 NOTE — CONSULTS
Swithced patient over to her own I 3 M Wound Vac.   at the bedside.  Suction settting at -175 megahertz

## 2022-10-20 NOTE — CARE UPDATE
10/19/22 5964   Patient Assessment/Suction   Level of Consciousness (AVPU) alert   Respiratory Effort Unlabored   Expansion/Accessory Muscles/Retractions no use of accessory muscles   All Lung Fields Breath Sounds clear   Rhythm/Pattern, Respiratory no shortness of breath reported   Cough Frequency no cough   PRE-TX-O2   O2 Device (Oxygen Therapy) room air   SpO2 99 %   Pulse Oximetry Type Intermittent   $ Pulse Oximetry - Multiple Charge Pulse Oximetry - Multiple   Pulse 75   Resp 20   Positioning   Head of Bed (HOB) Positioning HOB elevated;HOB at 20-30 degrees   Respiratory Evaluation   $ Care Plan Tech Time 15 min

## 2022-10-20 NOTE — TELEPHONE ENCOUNTER
----- Message from Saturnino Acosta sent at 10/20/2022  3:30 PM CDT -----  Regarding: pt called  Name of Who is Calling: LELA DEL CASTILLO [[id number]]      What is the request in detail: pt called requesting a call back she was recently discharged from the hospital and would like a sooner appt than 10/31.please advise      Can the clinic reply by [facility]: no      What Number to Call Back if not in [facility]:[phone] (home)

## 2022-10-20 NOTE — CARE UPDATE
AURELIO spoke with Tigist this morning at [facility] informed her that the patient is discharging this morning and discharge orders were sent through [facility] [place].    Tigist reported that they will resume service in tomorrow 10/21/22.

## 2022-10-20 NOTE — PLAN OF CARE
Patient cleared to discharge by case management.  Patient discharging home to resume home health with [facility].       10/20/22 0948   Final Note   Assessment Type Final Discharge Note   Anticipated Discharge Disposition Home-Health   Hospital Resources/Appts/Education Provided Appointments scheduled and added to AVS   Post-Acute Status   Post-Acute Authorization Home Health   Home Health Status Set-up Complete/Auth obtained   Discharge Delays None known at this time

## 2022-10-21 ENCOUNTER — TELEPHONE (OUTPATIENT)
Dept: FAMILY MEDICINE | Facility: CLINIC | Age: 57
End: 2022-10-21
Payer: MEDICARE

## 2022-10-21 NOTE — TELEPHONE ENCOUNTER
Spoke to patient, patient states she can not make this appointment due to dialysis. Advised patient my next available with Dr Lopez would be Nov 8th, and this is past the 7 days recommended for a hospital F/u.. Patient continued to state she wants to be seen Tuesday 10/25 after her wound care appointment, I advised patient several times we had no open available spots, and I can get her seen with someone else for her hospital follow up. Patient seemed to not understand what I was stating. I scheduled patient for first available, Tuesday Nov 8th, and advised her if she needs to cancel or reschedule to call. Patient verbalized understanding of appointment.

## 2022-10-21 NOTE — TELEPHONE ENCOUNTER
----- Message from Tuyet Rosas LPN sent at 10/21/2022 12:01 PM CDT -----  Regarding: Reschedule HFU?  Contact: self    ----- Message -----  From: Suellen Thomas  Sent: 10/21/2022  11:54 AM CDT  To: John Agarwal Staff    Type: Needs Medical Advice    Who Called:  patient  Symptoms (please be specific):  r/s hospital f/u    Best Call Back Number: [phone]   Additional Information: The pt stated that she would like to r/s his hospital follow up appt on 10/31 to 10/25 around 1:00pm. The pt stated that she can only do it on Tuesday. Please call pt back to r/s appt. Thanks.

## 2022-10-25 ENCOUNTER — OFFICE VISIT (OUTPATIENT)
Dept: WOUND CARE | Facility: HOSPITAL | Age: 57
End: 2022-10-25
Attending: PODIATRIST
Payer: MEDICARE

## 2022-10-25 VITALS
DIASTOLIC BLOOD PRESSURE: 93 MMHG | RESPIRATION RATE: 20 BRPM | HEART RATE: 77 BPM | SYSTOLIC BLOOD PRESSURE: 137 MMHG | TEMPERATURE: 98 F

## 2022-10-25 DIAGNOSIS — Z86.73 HISTORY OF TIA (TRANSIENT ISCHEMIC ATTACK): ICD-10-CM

## 2022-10-25 DIAGNOSIS — L97.529 ULCER OF TOE OF LEFT FOOT, UNSPECIFIED ULCER STAGE: ICD-10-CM

## 2022-10-25 DIAGNOSIS — N18.6 ESRD (END STAGE RENAL DISEASE): ICD-10-CM

## 2022-10-25 DIAGNOSIS — E11.628 DIABETIC FOOT INFECTION: ICD-10-CM

## 2022-10-25 DIAGNOSIS — Z91.89 AT RISK FOR READMISSION TO HOSPITAL: ICD-10-CM

## 2022-10-25 DIAGNOSIS — L97.519 DIABETIC ULCER OF OTHER PART OF RIGHT FOOT ASSOCIATED WITH DIABETES MELLITUS DUE TO UNDERLYING CONDITION, UNSPECIFIED ULCER STAGE: ICD-10-CM

## 2022-10-25 DIAGNOSIS — R60.0 BILATERAL LOWER EXTREMITY EDEMA: ICD-10-CM

## 2022-10-25 DIAGNOSIS — Z99.2 TYPE 2 DIABETES MELLITUS WITH CHRONIC KIDNEY DISEASE ON CHRONIC DIALYSIS, WITH LONG-TERM CURRENT USE OF INSULIN: ICD-10-CM

## 2022-10-25 DIAGNOSIS — E11.22 TYPE 2 DIABETES MELLITUS WITH CHRONIC KIDNEY DISEASE ON CHRONIC DIALYSIS, WITH LONG-TERM CURRENT USE OF INSULIN: ICD-10-CM

## 2022-10-25 DIAGNOSIS — Z91.89 AT HIGH RISK FOR INADEQUATE NUTRITIONAL INTAKE: ICD-10-CM

## 2022-10-25 DIAGNOSIS — L97.523 ULCER OF LEFT FOOT WITH NECROSIS OF MUSCLE: Primary | ICD-10-CM

## 2022-10-25 DIAGNOSIS — R26.2 DIFFICULTY IN WALKING, NOT ELSEWHERE CLASSIFIED: ICD-10-CM

## 2022-10-25 DIAGNOSIS — E08.621 DIABETIC ULCER OF OTHER PART OF RIGHT FOOT ASSOCIATED WITH DIABETES MELLITUS DUE TO UNDERLYING CONDITION, UNSPECIFIED ULCER STAGE: ICD-10-CM

## 2022-10-25 DIAGNOSIS — E11.641 TYPE 2 DIABETES MELLITUS WITH HYPOGLYCEMIA AND COMA, WITH LONG-TERM CURRENT USE OF INSULIN: ICD-10-CM

## 2022-10-25 DIAGNOSIS — R09.89 DECREASED PEDAL PULSES: ICD-10-CM

## 2022-10-25 DIAGNOSIS — L08.9 DIABETIC FOOT INFECTION: ICD-10-CM

## 2022-10-25 DIAGNOSIS — Z79.4 TYPE 2 DIABETES MELLITUS WITH HYPOGLYCEMIA AND COMA, WITH LONG-TERM CURRENT USE OF INSULIN: ICD-10-CM

## 2022-10-25 DIAGNOSIS — N18.6 TYPE 2 DIABETES MELLITUS WITH CHRONIC KIDNEY DISEASE ON CHRONIC DIALYSIS, WITH LONG-TERM CURRENT USE OF INSULIN: ICD-10-CM

## 2022-10-25 DIAGNOSIS — I73.9 PERIPHERAL VASCULAR DISEASE: ICD-10-CM

## 2022-10-25 DIAGNOSIS — E11.649 TYPE 2 DIABETES MELLITUS WITH HYPOGLYCEMIA UNAWARENESS: ICD-10-CM

## 2022-10-25 DIAGNOSIS — E11.621 DIABETIC ULCER OF LEFT MIDFOOT ASSOCIATED WITH TYPE 2 DIABETES MELLITUS, WITH NECROSIS OF MUSCLE: ICD-10-CM

## 2022-10-25 DIAGNOSIS — Z79.4 TYPE 2 DIABETES MELLITUS WITH CHRONIC KIDNEY DISEASE ON CHRONIC DIALYSIS, WITH LONG-TERM CURRENT USE OF INSULIN: ICD-10-CM

## 2022-10-25 DIAGNOSIS — L97.423 DIABETIC ULCER OF LEFT MIDFOOT ASSOCIATED WITH TYPE 2 DIABETES MELLITUS, WITH NECROSIS OF MUSCLE: ICD-10-CM

## 2022-10-25 DIAGNOSIS — R23.4 ESCHAR OF HEEL: ICD-10-CM

## 2022-10-25 DIAGNOSIS — L97.423 HEEL ULCER, LEFT, WITH NECROSIS OF MUSCLE: ICD-10-CM

## 2022-10-25 DIAGNOSIS — Z89.432 HISTORY OF AMPUTATION OF LEFT FOOT: ICD-10-CM

## 2022-10-25 DIAGNOSIS — L97.519 ULCER OF RIGHT FOOT, UNSPECIFIED ULCER STAGE: ICD-10-CM

## 2022-10-25 PROCEDURE — 1160F PR REVIEW ALL MEDS BY PRESCRIBER/CLIN PHARMACIST DOCUMENTED: ICD-10-PCS | Mod: CPTII,,, | Performed by: PODIATRIST

## 2022-10-25 PROCEDURE — 11042 DBRDMT SUBQ TIS 1ST 20SQCM/<: CPT | Mod: 79,59,, | Performed by: PODIATRIST

## 2022-10-25 PROCEDURE — 99214 OFFICE O/P EST MOD 30 MIN: CPT | Mod: 24,25,, | Performed by: PODIATRIST

## 2022-10-25 PROCEDURE — 3075F SYST BP GE 130 - 139MM HG: CPT | Mod: CPTII,,, | Performed by: PODIATRIST

## 2022-10-25 PROCEDURE — 11042 PR DEBRIDEMENT, SKIN, SUB-Q TISSUE,=<20 SQ CM: ICD-10-PCS | Mod: 79,59,, | Performed by: PODIATRIST

## 2022-10-25 PROCEDURE — 11043 DBRDMT MUSC&/FSCA 1ST 20/<: CPT | Mod: 79,,, | Performed by: PODIATRIST

## 2022-10-25 PROCEDURE — 11043 PR DEBRIDEMENT, SKIN, SUB-Q TISSUE,MUSCLE,=<20 SQ CM: ICD-10-PCS | Mod: 79,,, | Performed by: PODIATRIST

## 2022-10-25 PROCEDURE — 99214 PR OFFICE/OUTPT VISIT, EST, LEVL IV, 30-39 MIN: ICD-10-PCS | Mod: 24,25,, | Performed by: PODIATRIST

## 2022-10-25 PROCEDURE — 4010F ACE/ARB THERAPY RXD/TAKEN: CPT | Mod: CPTII,,, | Performed by: PODIATRIST

## 2022-10-25 PROCEDURE — 11042 DBRDMT SUBQ TIS 1ST 20SQCM/<: CPT | Mod: 59 | Performed by: PODIATRIST

## 2022-10-25 PROCEDURE — 1111F PR DISCHARGE MEDS RECONCILED W/ CURRENT OUTPATIENT MED LIST: ICD-10-PCS | Mod: CPTII,,, | Performed by: PODIATRIST

## 2022-10-25 PROCEDURE — 3080F DIAST BP >= 90 MM HG: CPT | Mod: CPTII,,, | Performed by: PODIATRIST

## 2022-10-25 PROCEDURE — 1159F MED LIST DOCD IN RCRD: CPT | Mod: CPTII,,, | Performed by: PODIATRIST

## 2022-10-25 PROCEDURE — 3066F NEPHROPATHY DOC TX: CPT | Mod: CPTII,,, | Performed by: PODIATRIST

## 2022-10-25 PROCEDURE — 1159F PR MEDICATION LIST DOCUMENTED IN MEDICAL RECORD: ICD-10-PCS | Mod: CPTII,,, | Performed by: PODIATRIST

## 2022-10-25 PROCEDURE — 1111F DSCHRG MED/CURRENT MED MERGE: CPT | Mod: CPTII,,, | Performed by: PODIATRIST

## 2022-10-25 PROCEDURE — 3051F PR MOST RECENT HEMOGLOBIN A1C LEVEL 7.0 - < 8.0%: ICD-10-PCS | Mod: CPTII,,, | Performed by: PODIATRIST

## 2022-10-25 PROCEDURE — 11043 DBRDMT MUSC&/FSCA 1ST 20/<: CPT | Performed by: PODIATRIST

## 2022-10-25 PROCEDURE — 3080F PR MOST RECENT DIASTOLIC BLOOD PRESSURE >= 90 MM HG: ICD-10-PCS | Mod: CPTII,,, | Performed by: PODIATRIST

## 2022-10-25 PROCEDURE — 1160F RVW MEDS BY RX/DR IN RCRD: CPT | Mod: CPTII,,, | Performed by: PODIATRIST

## 2022-10-25 PROCEDURE — 3051F HG A1C>EQUAL 7.0%<8.0%: CPT | Mod: CPTII,,, | Performed by: PODIATRIST

## 2022-10-25 PROCEDURE — 4010F PR ACE/ARB THEARPY RXD/TAKEN: ICD-10-PCS | Mod: CPTII,,, | Performed by: PODIATRIST

## 2022-10-25 PROCEDURE — 3066F PR DOCUMENTATION OF TREATMENT FOR NEPHROPATHY: ICD-10-PCS | Mod: CPTII,,, | Performed by: PODIATRIST

## 2022-10-25 PROCEDURE — 3075F PR MOST RECENT SYSTOLIC BLOOD PRESS GE 130-139MM HG: ICD-10-PCS | Mod: CPTII,,, | Performed by: PODIATRIST

## 2022-10-25 NOTE — PROGRESS NOTES
[address]  [place] [address]  Phone: [phone]   [phone]    Patient's PCP:Stefano Lopez MD  Referring Provider: Aaareferral Self    Subjective:      Chief Complaint:: Wound Care, Pressure Ulcer, Non-healing Wound, Diabetes, Diabetic Foot Ulcer, Wound Infection, Wound Check, and Hospital Follow Up    HPI    Patient presents for follow-up of multiple wounds on both feet.    Please see Wound docs for full assessment and evaluation of all wounds.    Patient was recently hospitalized and had vascular intervention performed.  Please see below for discharge summary.        1. Debridement of left heel wound and left plantar foot wound.  Evaluation and assessment only of right diabetic foot ulcer and left lower leg abrasion- both of these wounds are now healed.  See Wound Docs for assessment of wounds and procedure notes  2. Continue taking all medications as prescribed  3. Continue home health dressing changes  4. RTC one week   5. Counseled patient on increasing protein intake, not getting wound wet, keeping dressing clean dry and intact, following a healthy diet, elevating legs when able, removing pressure from wound    Total time spent for E&M 35  Total time for debridement 35 minutes       Counseling/Education:  I provided patient education verbally regarding:   The aspects of diabetes and how it pertains to the feet. I explained the importance of proper diabetic foot care and how it is essential for the health of their feet.    I discussed the importance of knowing their Hemoglobin A1c and that the level needs to be as close to 6 as possible. I discussed the increase complications of high blood sugar including stroke, blindness, heart attack, kidney failure and loss of limb secondary to neuropathy and PVD.     With neuropathy, beware of any breaks in the skin or redness. These areas are not recognized early due to the numbness.    I discussed Diabetes, lower back issues, metabolic  disorders, systemic causes, chemotherapy, vitamin deficiency, heavy metal exposure, as some of the causes. I also explained that as much as 40% of the time we can not find a cause. I discussed different treatments available to control the symptoms but which may not cure the problem.       Counseled patient on the aspects of diabetes and how it pertains to the feet.  I explained the importance of proper diabetic foot care and how it is essential for the health of their feet.      Shoe inspection. Patient instructed on proper foot hygeine. We discussed wearing proper shoe gear, daily foot inspections, never walking without protective shoe gear, never putting sharp instruments to feet, routine podiatric nail visits every 2-3 months.      Patient should call the office immediately if any signs of infection, such as fever, chills, sweats, increased redness or pain.    Patient was instructed to call the clinic or go to the emergency department if their symptoms do not improve, worsens, or if new symptoms develop.  Patient was advised that if any increased swelling, pain, or numbness arise to go immediately to the ED. Patient knows to call any time if an emergency arises. Shared decision making occurred and patient verbalized understanding in agreement with this plan.     I counseled the patient on their conditions, their implications and medical management.     >50% of this > 60 minute visit was spent face to face educating/counseling the patient    I spent a total of 60 minutes on the day of the visit.This includes face to face time and non-face to face time preparing to see the patient (eg, review of tests), obtaining and/or reviewing separately obtained history, documenting clinical information in the electronic or other health record, independently interpreting results and communicating results to the patient/family/caregiver, or care coordinator.        Patient Name: Leanna García  MRN: [id number]  Patient Class:  IP- Inpatient  Admission Date: 10/17/2022  Hospital Length of Stay: 2 days  Discharge Date and Time: 10/20/2022 11:05 AM  Attending Physician: No att. providers found   Discharging Provider: Pietro Shore MD  Primary Care Provider: Stefano Lopez MD        HPI:   57-year-old female with history of DM 2; ESRD on RRT; PAF, HLD, CAD, HTN, CVA, Non-healing DM foot ulcer was sent to ED for admission for angiography of lower extremities with possible surgical intervention for non-healing ulcer. She was recently in hospital for same. She has had osteomyelitis (bone biopsy grew Proteus) with eventual amputation of the 1st toe and partial 1st metatarsal head amputation on 8/26. This was followed by 2 weeks of cefepime as outpatient. Patient was discharged on clopidogrel, but reportedly did not start until a fortnight or so post discharge when she saw Podiatry for follow-up. She has since begun antiplatelet therapy. However, wound is not healing. Is for angiogram in AM with possible intervention pending angiography results. Denied chest discomfort. No SOB.      In ED: Labs reviewed: no leukocytosis with minimal normocytic anemia; trivial hyponatremia with end stage renal dysfunction. COVID negative. CXR reviewed: NAPD. EKG reviewed: sinus, left axis, no acute segments.     Discussed with ED MD: Inpatient admission to med/tele; continue home regimen for DM, ESRD, HTN, HLD and PAF; Nephrology for RRT; Vascular consult; Podiatry consult; Wound care consult; Insulin sliding scale        Procedure(s) (LRB):  Angiogram Extremity Unilateral (Left)       Hospital Course:   57-year-old with chronic left foot wound referred to emergency room by podiatrist and vascular surgery due to severe peripheral vascular disease and worsening left foot wound.  Unfortunately patient was not taking any antiplatelets, she was evaluated by Infectious Disease, Podiatry and vascular surgery, she underwent single-vessel runoff and was found to have  acceptable circulation.  She was cleared for discharge by multiple consultants, Infectious Disease recommended 14 more days of doxycycline.  She already has wound VAC on left foot.  Home health services were resumed.  Surprisingly patient was not requiring much insulin or antihypertensives.  We changed her insulin regimen as well as antihypertensive regimen and educated patient and .  She was advised to follow-up with her PCP and other outpatient providers.     I have seen the patient on the day of discharge and reviewed the discharge instructions as outlined below. Patient verbalized understanding and is aware to contact primary care physician or return to ED if new or worsening symptoms.         Goals of Care Treatment Preferences:  Code Status: Full Code         Much of the documentation for this visit was completed in the Wound Docs system.  Please see the attached documentation for further details about the patient's care. Scanned under the Media tab.        Alivia Bruno DPM     Vitals:    10/25/22 1301   BP: (!) 137/93   Pulse: 77   Resp: 20   Temp: 98.4 °F (36.9 °C)   PainSc: 0-No pain      Shoe Size:     Past Surgical History:   Procedure Laterality Date    ANGIOGRAPHY OF LOWER EXTREMITY Left 10/18/2022    Procedure: Angiogram Extremity Unilateral;  Surgeon: Ali Khoobehi, MD;  Location: [facility] CATH/EP LAB;  Service: Vascular;  Laterality: Left;    AV FISTULA PLACEMENT Left 8/29/2022    Procedure: CREATION, AV FISTULA;  Surgeon: Ali Khoobehi, MD;  Location: [facility] OR;  Service: Vascular;  Laterality: Left;    BONE BIOPSY Left 8/24/2022    Procedure: Biopsy-Bone;  Surgeon: Porfirio Ponce DPM;  Location: [facility] OR;  Service: Podiatry;  Laterality: Left;    COLONOSCOPY N/A 10/5/2016    Procedure: COLONOSCOPY;  Surgeon: Pillo Chanel MD;  Location: [facility] ENDO;  Service: Endoscopy;  Laterality: N/A;    COLONOSCOPY N/A 4/26/2022    Procedure: COLONOSCOPY;  Surgeon: Saravanan Rachel MD;  Location: [facility]  ENDO;  Service: Endoscopy;  Laterality: N/A;    FISTULOGRAM Left 8/24/2022    Procedure: Fistulogram;  Surgeon: Ali Khoobehi, MD;  Location: [facility] CATH/EP LAB;  Service: Vascular;  Laterality: Left;    HERNIA REPAIR Bilateral 11/22/2016    inguinal    HYSTERECTOMY      INCISION AND DRAINAGE FOOT Left 5/13/2022    Procedure: INCISION AND DRAINAGE, FOOT;  Surgeon: Ricardo Bruno DPM;  Location: [facility] OR;  Service: Podiatry;  Laterality: Left;    OOPHORECTOMY      THROMBECTOMY, AV FISTULA, UPPER EXTREMITY, PERCUTANEOUS  8/24/2022    Procedure: THROMBECTOMY, AV FISTULA, UPPER EXTREMITY, PERCUTANEOUS;  Surgeon: Ali Khoobehi, MD;  Location: [facility] CATH/EP LAB;  Service: Vascular;;    TOE AMPUTATION Left 8/26/2022    Procedure: AMPUTATION, TOE;  Surgeon: Porfirio Ponce DPM;  Location: [facility] OR;  Service: Podiatry;  Laterality: Left;     Past Medical History:   Diagnosis Date    A-fib     resolved per patient    Anemia due to end stage renal disease 6/30/2022    Arthritis     Bronchitis     Cardiovascular event risk, ASCVD 10-year risk 6.7% 10/15/2016    Diabetes mellitus type II     Diabetic foot infection     Diabetic ulcer of left midfoot 05/05/2022    Disorder of kidney and ureter     Encounter for blood transfusion     ESRD (end stage renal disease) 05/18/2018    MANJINDER (generalized anxiety disorder) 10/25/2016    History of colon polyps 11/02/2016    Hyperlipidemia     Hypertension     Stroke      Family History   Problem Relation Age of Onset    Hyperlipidemia Mother     Hypertension Mother     Hypertension Father     Diabetes Father     Diabetes Sister     Diabetes Sister     Diabetes Maternal Aunt     Diabetes Maternal Grandmother     Heart disease Maternal Grandmother     Cancer Paternal Grandmother     Breast cancer Neg Hx     Colon cancer Neg Hx     Ovarian cancer Neg Hx         Social History:   Marital Status:   Alcohol History:  reports no history of alcohol use.  Tobacco History:  reports that she has  never smoked. She has never used smokeless tobacco.  Drug History:  reports no history of drug use.    Review of patient's allergies indicates:   Allergen Reactions    Dilaudid [hydromorphone] Nausea And Vomiting    Chlorhexidine Itching    Lortab [hydrocodone-acetaminophen] Itching       Current Outpatient Medications   Medication Sig Dispense Refill    amLODIPine (NORVASC) 10 MG tablet Take 1 tablet (10 mg total) by mouth once daily.      aspirin (ECOTRIN) 81 MG EC tablet Take 81 mg by mouth once daily.      atorvastatin (LIPITOR) 80 MG tablet Take 1 tablet (80 mg total) by mouth once daily. 90 tablet 3    azelastine (ASTELIN) 137 mcg (0.1 %) nasal spray 1 spray (137 mcg total) by Nasal route 2 (two) times a day. 30 mL 2    blood sugar diagnostic Strp To check BG 4 times daily, to use with insurance preferred meter 450 strip 3    calcium acetate,phosphat bind, (PHOSLO) 667 mg capsule Take 1 capsule (667 mg total) by mouth 3 (three) times daily with meals.      cetirizine (ZYRTEC) 10 MG tablet Take 1 tablet (10 mg total) by mouth every other day. 45 tablet 3    clopidogreL (PLAVIX) 75 mg tablet Take 1 tablet (75 mg total) by mouth once daily. 90 tablet 3    doxycycline (MONODOX) 100 MG capsule Take 1 capsule (100 mg total) by mouth 2 (two) times daily. for 14 days 28 capsule 0    epoetin chris-epbx (RETACRIT) 4,000 unit/mL injection Inject 1.11 mLs (4,440 Units total) into the skin every Mon, Wed, Fri.      fluticasone propionate (FLONASE) 50 mcg/actuation nasal spray 2 sprays (100 mcg total) by Each Nostril route once daily. 16 g 3    gabapentin (NEURONTIN) 100 MG capsule Take 1 capsule (100 mg total) by mouth 2 (two) times daily. 180 capsule 3    insulin aspart U-100 (NOVOLOG FLEXPEN U-100 INSULIN) 100 unit/mL (3 mL) InPn pen Inject 5 Units into the skin 3 (three) times daily with meals. 3 mL 6    lactulose (CHRONULAC) 10 gram/15 mL solution Take 20 g by mouth 2 (two) times a day.      ondansetron (ZOFRAN-ODT) 8  "MG TbDL Take 1 tablet (8 mg total) by mouth every 8 (eight) hours as needed (nausea).      oxyCODONE (ROXICODONE) 5 MG immediate release tablet Take 1 tablet (5 mg total) by mouth every 6 (six) hours as needed for Pain.      pen needle, diabetic (BD ULTRA-FINE ROBERT PEN NEEDLE) 32 gauge x 5/32" Ndle To use 4 times per day with insulin injections. 450 each 2    polyethylene glycol (GLYCOLAX) 17 gram PwPk Take 17 g by mouth 2 (two) times daily as needed.      senna-docusate 8.6-50 mg (PERICOLACE) 8.6-50 mg per tablet Take 1 tablet by mouth 2 (two) times daily.       No current facility-administered medications for this visit.       Review of Systems   Constitutional:  Negative for chills, fatigue, fever and unexpected weight change.   HENT:  Negative for hearing loss and trouble swallowing.    Eyes:  Negative for photophobia and visual disturbance.   Respiratory:  Negative for cough, shortness of breath and wheezing.    Cardiovascular:  Negative for chest pain, palpitations and leg swelling.   Gastrointestinal:  Negative for abdominal pain and nausea.   Genitourinary:  Negative for dysuria and frequency.   Musculoskeletal:  Negative for arthralgias, back pain, gait problem, joint swelling and myalgias.   Skin:  Positive for wound. Negative for rash.   Neurological:  Negative for tremors, seizures, weakness, numbness and headaches.   Hematological:  Does not bruise/bleed easily.       Objective:        Physical Exam:   Foot Exam  Physical Exam  Ortho/SPM Exam     Imaging:            Assessment:       1. Ulcer of left foot with necrosis of muscle    2. Ulcer of toe of left foot, unspecified ulcer stage    3. Type 2 diabetes mellitus with hypoglycemia and coma, with long-term current use of insulin    4. Peripheral vascular disease    5. Diabetic ulcer of left midfoot associated with type 2 diabetes mellitus, with necrosis of muscle    6. At high risk for inadequate nutritional intake    7. Type 2 diabetes mellitus with " hypoglycemia unawareness    8. Type 2 diabetes mellitus with chronic kidney disease on chronic dialysis, with long-term current use of insulin    9. History of TIA (transient ischemic attack)    10. Difficulty in walking, not elsewhere classified    11. Diabetic foot infection    12. Ulcer of right foot, unspecified ulcer stage    13. Decreased pedal pulses    14. History of amputation of left foot    15. ESRD (end stage renal disease)    16. At risk for readmission to hospital    17. Heel ulcer, left, with necrosis of muscle    18. Diabetic ulcer of other part of right foot associated with diabetes mellitus due to underlying condition, unspecified ulcer stage    19. Eschar of heel    20. Bilateral lower extremity edema      Plan:   Ulcer of left foot with necrosis of muscle    Ulcer of toe of left foot, unspecified ulcer stage    Type 2 diabetes mellitus with hypoglycemia and coma, with long-term current use of insulin    Peripheral vascular disease    Diabetic ulcer of left midfoot associated with type 2 diabetes mellitus, with necrosis of muscle    At high risk for inadequate nutritional intake    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    Type 2 diabetes mellitus with chronic kidney disease on chronic dialysis, with long-term current use of insulin    History of TIA (transient ischemic attack)    Difficulty in walking, not elsewhere classified    Diabetic foot infection    Ulcer of right foot, unspecified ulcer stage    Decreased pedal pulses    History of amputation of left foot    ESRD (end stage renal disease)    At risk for readmission to hospital    Heel ulcer, left, with necrosis of muscle    Diabetic ulcer of other part of right foot associated with diabetes mellitus due to underlying condition, unspecified ulcer stage    Eschar of heel    Bilateral lower extremity edema    Follow up in about 1 week (around 11/1/2022).    Procedures          Counseling:     I provided patient education  verbally regarding:   Patient diagnosis, treatment options, as well as alternatives, risks, and benefits.     This note was created using Dragon voice recognition software that occasionally misinterpreted phrases or words.

## 2022-10-26 ENCOUNTER — TELEPHONE (OUTPATIENT)
Dept: PODIATRY | Facility: CLINIC | Age: 57
End: 2022-10-26
Payer: MEDICARE

## 2022-10-26 NOTE — TELEPHONE ENCOUNTER
----- Message from Renetta Zhou sent at 10/26/2022  8:59 AM CDT -----  Contact: Tigist  Type:  Needs Medical Advice    Who Called:  Tigist with [facility]       Would the patient rather a call back or a response via [facility]?  Call     Best Call Back Number:  [phone]    Additional Information:  Tigist with [facility] would like to speak with nurse in regards to clarify wound care orders.     Please call to advice

## 2022-10-31 ENCOUNTER — DOCUMENT SCAN (OUTPATIENT)
Dept: HOME HEALTH SERVICES | Facility: HOSPITAL | Age: 57
End: 2022-10-31
Payer: MEDICARE

## 2022-11-01 ENCOUNTER — OFFICE VISIT (OUTPATIENT)
Dept: WOUND CARE | Facility: HOSPITAL | Age: 57
End: 2022-11-01
Attending: PODIATRIST
Payer: MEDICARE

## 2022-11-01 VITALS
RESPIRATION RATE: 17 BRPM | SYSTOLIC BLOOD PRESSURE: 142 MMHG | DIASTOLIC BLOOD PRESSURE: 87 MMHG | HEART RATE: 76 BPM | TEMPERATURE: 98 F

## 2022-11-01 DIAGNOSIS — Z79.4 TYPE 2 DIABETES MELLITUS WITH CHRONIC KIDNEY DISEASE ON CHRONIC DIALYSIS, WITH LONG-TERM CURRENT USE OF INSULIN: ICD-10-CM

## 2022-11-01 DIAGNOSIS — E11.621 DIABETIC ULCER OF LEFT MIDFOOT ASSOCIATED WITH TYPE 2 DIABETES MELLITUS, WITH NECROSIS OF MUSCLE: ICD-10-CM

## 2022-11-01 DIAGNOSIS — N18.6 ESRD (END STAGE RENAL DISEASE): ICD-10-CM

## 2022-11-01 DIAGNOSIS — Z86.73 HISTORY OF TIA (TRANSIENT ISCHEMIC ATTACK): ICD-10-CM

## 2022-11-01 DIAGNOSIS — E11.649 TYPE 2 DIABETES MELLITUS WITH HYPOGLYCEMIA UNAWARENESS: ICD-10-CM

## 2022-11-01 DIAGNOSIS — E11.22 TYPE 2 DIABETES MELLITUS WITH CHRONIC KIDNEY DISEASE ON CHRONIC DIALYSIS, WITH LONG-TERM CURRENT USE OF INSULIN: ICD-10-CM

## 2022-11-01 DIAGNOSIS — E11.628 DIABETIC FOOT INFECTION: ICD-10-CM

## 2022-11-01 DIAGNOSIS — E11.641 TYPE 2 DIABETES MELLITUS WITH HYPOGLYCEMIA AND COMA, WITH LONG-TERM CURRENT USE OF INSULIN: ICD-10-CM

## 2022-11-01 DIAGNOSIS — Z99.2 TYPE 2 DIABETES MELLITUS WITH CHRONIC KIDNEY DISEASE ON CHRONIC DIALYSIS, WITH LONG-TERM CURRENT USE OF INSULIN: ICD-10-CM

## 2022-11-01 DIAGNOSIS — Z91.89 AT HIGH RISK FOR INADEQUATE NUTRITIONAL INTAKE: ICD-10-CM

## 2022-11-01 DIAGNOSIS — N18.6 TYPE 2 DIABETES MELLITUS WITH CHRONIC KIDNEY DISEASE ON CHRONIC DIALYSIS, WITH LONG-TERM CURRENT USE OF INSULIN: ICD-10-CM

## 2022-11-01 DIAGNOSIS — Z89.432 HISTORY OF AMPUTATION OF LEFT FOOT: ICD-10-CM

## 2022-11-01 DIAGNOSIS — L97.522 ULCER OF LEFT FOOT WITH FAT LAYER EXPOSED: ICD-10-CM

## 2022-11-01 DIAGNOSIS — L97.519 ULCER OF RIGHT FOOT, UNSPECIFIED ULCER STAGE: ICD-10-CM

## 2022-11-01 DIAGNOSIS — R09.89 DECREASED PEDAL PULSES: ICD-10-CM

## 2022-11-01 DIAGNOSIS — L97.523 ULCER OF LEFT FOOT WITH NECROSIS OF MUSCLE: ICD-10-CM

## 2022-11-01 DIAGNOSIS — Z91.89 AT RISK FOR READMISSION TO HOSPITAL: ICD-10-CM

## 2022-11-01 DIAGNOSIS — L08.9 DIABETIC FOOT INFECTION: ICD-10-CM

## 2022-11-01 DIAGNOSIS — I73.9 PERIPHERAL VASCULAR DISEASE: ICD-10-CM

## 2022-11-01 DIAGNOSIS — R26.2 DIFFICULTY IN WALKING, NOT ELSEWHERE CLASSIFIED: ICD-10-CM

## 2022-11-01 DIAGNOSIS — L97.423 HEEL ULCER, LEFT, WITH NECROSIS OF MUSCLE: Primary | ICD-10-CM

## 2022-11-01 DIAGNOSIS — Z79.4 TYPE 2 DIABETES MELLITUS WITH HYPOGLYCEMIA AND COMA, WITH LONG-TERM CURRENT USE OF INSULIN: ICD-10-CM

## 2022-11-01 DIAGNOSIS — L97.423 DIABETIC ULCER OF LEFT MIDFOOT ASSOCIATED WITH TYPE 2 DIABETES MELLITUS, WITH NECROSIS OF MUSCLE: ICD-10-CM

## 2022-11-01 DIAGNOSIS — R60.0 BILATERAL LOWER EXTREMITY EDEMA: ICD-10-CM

## 2022-11-01 PROCEDURE — 3079F DIAST BP 80-89 MM HG: CPT | Mod: CPTII,,, | Performed by: PODIATRIST

## 2022-11-01 PROCEDURE — 99214 PR OFFICE/OUTPT VISIT, EST, LEVL IV, 30-39 MIN: ICD-10-PCS | Mod: 24,25,, | Performed by: PODIATRIST

## 2022-11-01 PROCEDURE — 4010F ACE/ARB THERAPY RXD/TAKEN: CPT | Mod: CPTII,,, | Performed by: PODIATRIST

## 2022-11-01 PROCEDURE — 1160F RVW MEDS BY RX/DR IN RCRD: CPT | Mod: CPTII,,, | Performed by: PODIATRIST

## 2022-11-01 PROCEDURE — 11043 DBRDMT MUSC&/FSCA 1ST 20/<: CPT | Performed by: PODIATRIST

## 2022-11-01 PROCEDURE — 1111F PR DISCHARGE MEDS RECONCILED W/ CURRENT OUTPATIENT MED LIST: ICD-10-PCS | Mod: CPTII,,, | Performed by: PODIATRIST

## 2022-11-01 PROCEDURE — 3077F SYST BP >= 140 MM HG: CPT | Mod: CPTII,,, | Performed by: PODIATRIST

## 2022-11-01 PROCEDURE — 1159F PR MEDICATION LIST DOCUMENTED IN MEDICAL RECORD: ICD-10-PCS | Mod: CPTII,,, | Performed by: PODIATRIST

## 2022-11-01 PROCEDURE — 99214 OFFICE O/P EST MOD 30 MIN: CPT | Mod: 24,25,, | Performed by: PODIATRIST

## 2022-11-01 PROCEDURE — 1111F DSCHRG MED/CURRENT MED MERGE: CPT | Mod: CPTII,,, | Performed by: PODIATRIST

## 2022-11-01 PROCEDURE — 11043 DBRDMT MUSC&/FSCA 1ST 20/<: CPT | Mod: 79,,, | Performed by: PODIATRIST

## 2022-11-01 PROCEDURE — 3066F NEPHROPATHY DOC TX: CPT | Mod: CPTII,,, | Performed by: PODIATRIST

## 2022-11-01 PROCEDURE — 1160F PR REVIEW ALL MEDS BY PRESCRIBER/CLIN PHARMACIST DOCUMENTED: ICD-10-PCS | Mod: CPTII,,, | Performed by: PODIATRIST

## 2022-11-01 PROCEDURE — 1159F MED LIST DOCD IN RCRD: CPT | Mod: CPTII,,, | Performed by: PODIATRIST

## 2022-11-01 PROCEDURE — 11043 PR DEBRIDEMENT, SKIN, SUB-Q TISSUE,MUSCLE,=<20 SQ CM: ICD-10-PCS | Mod: 79,,, | Performed by: PODIATRIST

## 2022-11-01 PROCEDURE — 4010F PR ACE/ARB THEARPY RXD/TAKEN: ICD-10-PCS | Mod: CPTII,,, | Performed by: PODIATRIST

## 2022-11-01 PROCEDURE — 3077F PR MOST RECENT SYSTOLIC BLOOD PRESSURE >= 140 MM HG: ICD-10-PCS | Mod: CPTII,,, | Performed by: PODIATRIST

## 2022-11-01 PROCEDURE — 3079F PR MOST RECENT DIASTOLIC BLOOD PRESSURE 80-89 MM HG: ICD-10-PCS | Mod: CPTII,,, | Performed by: PODIATRIST

## 2022-11-01 PROCEDURE — 3051F HG A1C>EQUAL 7.0%<8.0%: CPT | Mod: CPTII,,, | Performed by: PODIATRIST

## 2022-11-01 PROCEDURE — 3051F PR MOST RECENT HEMOGLOBIN A1C LEVEL 7.0 - < 8.0%: ICD-10-PCS | Mod: CPTII,,, | Performed by: PODIATRIST

## 2022-11-01 PROCEDURE — 3066F PR DOCUMENTATION OF TREATMENT FOR NEPHROPATHY: ICD-10-PCS | Mod: CPTII,,, | Performed by: PODIATRIST

## 2022-11-01 NOTE — PROGRESS NOTES
[address] [facility] [address]  [place] [address]  Phone: [phone]   [phone]    Patient's PCP:Stefano Lopez MD  Referring Provider: Aaareferral Self    Subjective:      Chief Complaint:: Wound Care, Wound Infection, Non-healing Wound, Pressure Ulcer, Diabetes, Wound Check, and Diabetic Foot Ulcer    HPI      Patient presents for follow-up of left plantar foot wound and left heel wound.       Please see Wound docs for full assessment and evaluation of all wounds.     Patient was recently hospitalized and had vascular intervention performed.  Please see below for discharge summary.       1. Debridement of left heel wound.  Evaluation and assessment only of left plantar foot wound. See Wound Docs for assessment of wounds and procedure notes  2. Continue taking all medications as prescribed  3. Continue home health dressing changes  4. RTC one week   5. Counseled patient on increasing protein intake, not getting wound wet, keeping dressing clean dry and intact, following a healthy diet, elevating legs when able, removing pressure from wound     Total time spent for E&M 35  Total time for debridement 35 minutes         Counseling/Education:  I provided patient education verbally regarding:   The aspects of diabetes and how it pertains to the feet. I explained the importance of proper diabetic foot care and how it is essential for the health of their feet.     I discussed the importance of knowing their Hemoglobin A1c and that the level needs to be as close to 6 as possible. I discussed the increase complications of high blood sugar including stroke, blindness, heart attack, kidney failure and loss of limb secondary to neuropathy and PVD.      With neuropathy, beware of any breaks in the skin or redness. These areas are not recognized early due to the numbness.     I discussed Diabetes, lower back issues, metabolic disorders, systemic causes, chemotherapy, vitamin deficiency, heavy metal exposure, as some  of the causes. I also explained that as much as 40% of the time we can not find a cause. I discussed different treatments available to control the symptoms but which may not cure the problem.         Counseled patient on the aspects of diabetes and how it pertains to the feet.  I explained the importance of proper diabetic foot care and how it is essential for the health of their feet.        Shoe inspection. Patient instructed on proper foot hygeine. We discussed wearing proper shoe gear, daily foot inspections, never walking without protective shoe gear, never putting sharp instruments to feet, routine podiatric nail visits every 2-3 months.       Patient should call the office immediately if any signs of infection, such as fever, chills, sweats, increased redness or pain.     Patient was instructed to call the clinic or go to the emergency department if their symptoms do not improve, worsens, or if new symptoms develop.  Patient was advised that if any increased swelling, pain, or numbness arise to go immediately to the ED. Patient knows to call any time if an emergency arises. Shared decision making occurred and patient verbalized understanding in agreement with this plan.      I counseled the patient on their conditions, their implications and medical management.     >50% of this > 60 minute visit was spent face to face educating/counseling the patient     I spent a total of 60 minutes on the day of the visit.This includes face to face time and non-face to face time preparing to see the patient (eg, review of tests), obtaining and/or reviewing separately obtained history, documenting clinical information in the electronic or other health record, independently interpreting results and communicating results to the patient/family/caregiver, or care coordinator.        Patient Name: Leanna García  MRN: [id number]  Patient Class: IP- Inpatient  Admission Date: 10/17/2022  Hospital Length of Stay: 2 days  Discharge  Date and Time: 10/20/2022 11:05 AM  Attending Physician: Precious att. providers found   Discharging Provider: Pietro Shore MD  Primary Care Provider: Stefano Lopez MD        HPI:   57-year-old female with history of DM 2; ESRD on RRT; PAF, HLD, CAD, HTN, CVA, Non-healing DM foot ulcer was sent to ED for admission for angiography of lower extremities with possible surgical intervention for non-healing ulcer. She was recently in hospital for same. She has had osteomyelitis (bone biopsy grew Proteus) with eventual amputation of the 1st toe and partial 1st metatarsal head amputation on 8/26. This was followed by 2 weeks of cefepime as outpatient. Patient was discharged on clopidogrel, but reportedly did not start until a fortnight or so post discharge when she saw Podiatry for follow-up. She has since begun antiplatelet therapy. However, wound is not healing. Is for angiogram in AM with possible intervention pending angiography results. Denied chest discomfort. No SOB.      In ED: Labs reviewed: no leukocytosis with minimal normocytic anemia; trivial hyponatremia with end stage renal dysfunction. COVID negative. CXR reviewed: NAPD. EKG reviewed: sinus, left axis, no acute segments.     Discussed with ED MD: Inpatient admission to med/tele; continue home regimen for DM, ESRD, HTN, HLD and PAF; Nephrology for RRT; Vascular consult; Podiatry consult; Wound care consult; Insulin sliding scale        Procedure(s) (LRB):  Angiogram Extremity Unilateral (Left)       Hospital Course:   57-year-old with chronic left foot wound referred to emergency room by podiatrist and vascular surgery due to severe peripheral vascular disease and worsening left foot wound.  Unfortunately patient was not taking any antiplatelets, she was evaluated by Infectious Disease, Podiatry and vascular surgery, she underwent single-vessel runoff and was found to have acceptable circulation.  She was cleared for discharge by multiple consultants,  Infectious Disease recommended 14 more days of doxycycline.  She already has wound VAC on left foot.  Home health services were resumed.  Surprisingly patient was not requiring much insulin or antihypertensives.  We changed her insulin regimen as well as antihypertensive regimen and educated patient and .  She was advised to follow-up with her PCP and other outpatient providers.     I have seen the patient on the day of discharge and reviewed the discharge instructions as outlined below. Patient verbalized understanding and is aware to contact primary care physician or return to ED if new or worsening symptoms.         Goals of Care Treatment Preferences:  Code Status: Full Code           Much of the documentation for this visit was completed in the Wound Docs system.  Please see the attached documentation for further details about the patient's care. Scanned under the Media tab.         Alivia Bruno DPM     Vitals:    11/01/22 1121   BP: (!) 142/87   Pulse: 76   Resp: 17   Temp: 98.4 °F (36.9 °C)   PainSc: 0-No pain      Shoe Size:     Past Surgical History:   Procedure Laterality Date    ANGIOGRAPHY OF LOWER EXTREMITY Left 10/18/2022    Procedure: Angiogram Extremity Unilateral;  Surgeon: Ali Khoobehi, MD;  Location: [facility] CATH/EP LAB;  Service: Vascular;  Laterality: Left;    AV FISTULA PLACEMENT Left 8/29/2022    Procedure: CREATION, AV FISTULA;  Surgeon: Ali Khoobehi, MD;  Location: [facility] OR;  Service: Vascular;  Laterality: Left;    BONE BIOPSY Left 8/24/2022    Procedure: Biopsy-Bone;  Surgeon: Porfirio Ponce DPM;  Location: [facility] OR;  Service: Podiatry;  Laterality: Left;    COLONOSCOPY N/A 10/5/2016    Procedure: COLONOSCOPY;  Surgeon: Pillo Chanel MD;  Location: [facility] ENDO;  Service: Endoscopy;  Laterality: N/A;    COLONOSCOPY N/A 4/26/2022    Procedure: COLONOSCOPY;  Surgeon: Saravanan Rachel MD;  Location: [facility] ENDO;  Service: Endoscopy;  Laterality: N/A;    FISTULOGRAM Left 8/24/2022     Procedure: Fistulogram;  Surgeon: Ali Khoobehi, MD;  Location: [facility] CATH/EP LAB;  Service: Vascular;  Laterality: Left;    HERNIA REPAIR Bilateral 11/22/2016    inguinal    HYSTERECTOMY      INCISION AND DRAINAGE FOOT Left 5/13/2022    Procedure: INCISION AND DRAINAGE, FOOT;  Surgeon: Ricardo Bruno DPM;  Location: [facility] OR;  Service: Podiatry;  Laterality: Left;    OOPHORECTOMY      THROMBECTOMY, AV FISTULA, UPPER EXTREMITY, PERCUTANEOUS  8/24/2022    Procedure: THROMBECTOMY, AV FISTULA, UPPER EXTREMITY, PERCUTANEOUS;  Surgeon: Ali Khoobehi, MD;  Location: [facility] CATH/EP LAB;  Service: Vascular;;    TOE AMPUTATION Left 8/26/2022    Procedure: AMPUTATION, TOE;  Surgeon: Porfirio Ponce DPM;  Location: [facility] OR;  Service: Podiatry;  Laterality: Left;     Past Medical History:   Diagnosis Date    A-fib     resolved per patient    Anemia due to end stage renal disease 6/30/2022    Arthritis     Bronchitis     Cardiovascular event risk, ASCVD 10-year risk 6.7% 10/15/2016    Diabetes mellitus type II     Diabetic foot infection     Diabetic ulcer of left midfoot 05/05/2022    Disorder of kidney and ureter     Encounter for blood transfusion     ESRD (end stage renal disease) 05/18/2018    MANJINDER (generalized anxiety disorder) 10/25/2016    History of colon polyps 11/02/2016    Hyperlipidemia     Hypertension     Stroke      Family History   Problem Relation Age of Onset    Hyperlipidemia Mother     Hypertension Mother     Hypertension Father     Diabetes Father     Diabetes Sister     Diabetes Sister     Diabetes Maternal Aunt     Diabetes Maternal Grandmother     Heart disease Maternal Grandmother     Cancer Paternal Grandmother     Breast cancer Neg Hx     Colon cancer Neg Hx     Ovarian cancer Neg Hx         Social History:   Marital Status:   Alcohol History:  reports no history of alcohol use.  Tobacco History:  reports that she has never smoked. She has never used smokeless tobacco.  Drug History:  reports no  history of drug use.    Review of patient's allergies indicates:   Allergen Reactions    Dilaudid [hydromorphone] Nausea And Vomiting    Chlorhexidine Itching    Lortab [hydrocodone-acetaminophen] Itching       Current Outpatient Medications   Medication Sig Dispense Refill    amLODIPine (NORVASC) 10 MG tablet Take 1 tablet (10 mg total) by mouth once daily.      aspirin (ECOTRIN) 81 MG EC tablet Take 81 mg by mouth once daily.      atorvastatin (LIPITOR) 80 MG tablet Take 1 tablet (80 mg total) by mouth once daily. 90 tablet 3    azelastine (ASTELIN) 137 mcg (0.1 %) nasal spray 1 spray (137 mcg total) by Nasal route 2 (two) times a day. 30 mL 2    blood sugar diagnostic Strp To check BG 4 times daily, to use with insurance preferred meter 450 strip 3    calcium acetate,phosphat bind, (PHOSLO) 667 mg capsule Take 1 capsule (667 mg total) by mouth 3 (three) times daily with meals.      cetirizine (ZYRTEC) 10 MG tablet Take 1 tablet (10 mg total) by mouth every other day. 45 tablet 3    clopidogreL (PLAVIX) 75 mg tablet Take 1 tablet (75 mg total) by mouth once daily. 90 tablet 3    doxycycline (MONODOX) 100 MG capsule Take 1 capsule (100 mg total) by mouth 2 (two) times daily. for 14 days 28 capsule 0    epoetin chris-epbx (RETACRIT) 4,000 unit/mL injection Inject 1.11 mLs (4,440 Units total) into the skin every Mon, Wed, Fri.      fluticasone propionate (FLONASE) 50 mcg/actuation nasal spray 2 sprays (100 mcg total) by Each Nostril route once daily. 16 g 3    gabapentin (NEURONTIN) 100 MG capsule Take 1 capsule (100 mg total) by mouth 2 (two) times daily. 180 capsule 3    insulin aspart U-100 (NOVOLOG FLEXPEN U-100 INSULIN) 100 unit/mL (3 mL) InPn pen Inject 5 Units into the skin 3 (three) times daily with meals. 3 mL 6    lactulose (CHRONULAC) 10 gram/15 mL solution Take 20 g by mouth 2 (two) times a day.      ondansetron (ZOFRAN-ODT) 8 MG TbDL Take 1 tablet (8 mg total) by mouth every 8 (eight) hours as needed  "(nausea).      oxyCODONE (ROXICODONE) 5 MG immediate release tablet Take 1 tablet (5 mg total) by mouth every 6 (six) hours as needed for Pain.      pen needle, diabetic (BD ULTRA-FINE ROBERT PEN NEEDLE) 32 gauge x 5/32" Ndle To use 4 times per day with insulin injections. 450 each 2    polyethylene glycol (GLYCOLAX) 17 gram PwPk Take 17 g by mouth 2 (two) times daily as needed.      senna-docusate 8.6-50 mg (PERICOLACE) 8.6-50 mg per tablet Take 1 tablet by mouth 2 (two) times daily.       No current facility-administered medications for this visit.       Review of Systems   Constitutional:  Negative for chills, fatigue, fever and unexpected weight change.   HENT:  Negative for hearing loss and trouble swallowing.    Eyes:  Negative for photophobia and visual disturbance.   Respiratory:  Negative for cough, shortness of breath and wheezing.    Cardiovascular:  Negative for chest pain, palpitations and leg swelling.   Gastrointestinal:  Negative for abdominal pain and nausea.   Genitourinary:  Negative for dysuria and frequency.   Musculoskeletal:  Negative for arthralgias, back pain, gait problem, joint swelling and myalgias.   Skin:  Positive for wound. Negative for rash.   Neurological:  Negative for tremors, seizures, weakness, numbness and headaches.   Hematological:  Does not bruise/bleed easily.       Objective:        Physical Exam:   Foot Exam  Physical Exam  Ortho/SPM Exam     Imaging:            Assessment:       1. Heel ulcer, left, with necrosis of muscle    2. Type 2 diabetes mellitus with hypoglycemia and coma, with long-term current use of insulin    3. Peripheral vascular disease    4. Diabetic ulcer of left midfoot associated with type 2 diabetes mellitus, with necrosis of muscle    5. At high risk for inadequate nutritional intake    6. Difficulty in walking, not elsewhere classified    7. History of TIA (transient ischemic attack)    8. Type 2 diabetes mellitus with chronic kidney disease on " chronic dialysis, with long-term current use of insulin    9. 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    10. Ulcer of left foot with fat layer exposed    11. Ulcer of right foot, unspecified ulcer stage    12. Diabetic foot infection    13. ESRD (end stage renal disease)    14. At risk for readmission to hospital    15. Decreased pedal pulses    16. History of amputation of left foot    17. Ulcer of left foot with necrosis of muscle    18. Bilateral lower extremity edema      Plan:   Heel ulcer, left, with necrosis of muscle    Type 2 diabetes mellitus with hypoglycemia and coma, with long-term current use of insulin    Peripheral vascular disease    Diabetic ulcer of left midfoot associated with type 2 diabetes mellitus, with necrosis of muscle    At high risk for inadequate nutritional intake    Difficulty in walking, not elsewhere classified    History of TIA (transient ischemic attack)    Type 2 diabetes mellitus with chronic kidney disease on chronic dialysis, with long-term current use of insulin    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    Ulcer of left foot with fat layer exposed    Ulcer of right foot, unspecified ulcer stage    Diabetic foot infection    ESRD (end stage renal disease)    At risk for readmission to hospital    Decreased pedal pulses    History of amputation of left foot    Ulcer of left foot with necrosis of muscle    Bilateral lower extremity edema    Follow up in about 1 week (around 11/8/2022).    Procedures          Counseling:     I provided patient education verbally regarding:   Patient diagnosis, treatment options, as well as alternatives, risks, and benefits.     This note was created using Dragon voice recognition software that occasionally misinterpreted phrases or words.

## 2022-11-02 ENCOUNTER — HOSPITAL ENCOUNTER (OUTPATIENT)
Facility: HOSPITAL | Age: 57
Discharge: HOME HEALTH | End: 2022-11-04
Attending: EMERGENCY MEDICINE | Admitting: FAMILY MEDICINE
Payer: MEDICARE

## 2022-11-02 DIAGNOSIS — E11.621 DIABETIC ULCER OF LEFT MIDFOOT ASSOCIATED WITH TYPE 2 DIABETES MELLITUS, WITH NECROSIS OF MUSCLE: ICD-10-CM

## 2022-11-02 DIAGNOSIS — L97.423 DIABETIC ULCER OF LEFT MIDFOOT ASSOCIATED WITH TYPE 2 DIABETES MELLITUS, WITH NECROSIS OF MUSCLE: ICD-10-CM

## 2022-11-02 DIAGNOSIS — R55 SYNCOPE: Primary | ICD-10-CM

## 2022-11-02 LAB
ALBUMIN SERPL BCP-MCNC: 4.7 G/DL (ref 3.5–5.2)
ALP SERPL-CCNC: 105 U/L (ref 55–135)
ALT SERPL W/O P-5'-P-CCNC: 39 U/L (ref 10–44)
ANION GAP SERPL CALC-SCNC: 17 MMOL/L (ref 8–16)
AST SERPL-CCNC: 33 U/L (ref 10–40)
B-OH-BUTYR BLD STRIP-SCNC: 0.2 MMOL/L (ref 0–0.5)
BASOPHILS # BLD AUTO: 0.06 K/UL (ref 0–0.2)
BASOPHILS NFR BLD: 0.6 % (ref 0–1.9)
BILIRUB SERPL-MCNC: 0.5 MG/DL (ref 0.1–1)
BNP SERPL-MCNC: 174 PG/ML (ref 0–99)
BUN SERPL-MCNC: 62 MG/DL (ref 6–20)
CALCIUM SERPL-MCNC: 10.3 MG/DL (ref 8.7–10.5)
CHLORIDE SERPL-SCNC: 90 MMOL/L (ref 95–110)
CO2 SERPL-SCNC: 29 MMOL/L (ref 23–29)
CREAT SERPL-MCNC: 6.1 MG/DL (ref 0.5–1.4)
DIFFERENTIAL METHOD: ABNORMAL
EOSINOPHIL # BLD AUTO: 0.2 K/UL (ref 0–0.5)
EOSINOPHIL NFR BLD: 2.5 % (ref 0–8)
ERYTHROCYTE [DISTWIDTH] IN BLOOD BY AUTOMATED COUNT: 14 % (ref 11.5–14.5)
EST. GFR  (NO RACE VARIABLE): 7.5 ML/MIN/1.73 M^2
GLUCOSE SERPL-MCNC: 192 MG/DL (ref 70–110)
GLUCOSE SERPL-MCNC: 225 MG/DL (ref 70–110)
HCT VFR BLD AUTO: 41.6 % (ref 37–48.5)
HGB BLD-MCNC: 13.4 G/DL (ref 12–16)
IMM GRANULOCYTES # BLD AUTO: 0.02 K/UL (ref 0–0.04)
IMM GRANULOCYTES NFR BLD AUTO: 0.2 % (ref 0–0.5)
LYMPHOCYTES # BLD AUTO: 1.4 K/UL (ref 1–4.8)
LYMPHOCYTES NFR BLD: 14.1 % (ref 18–48)
MAGNESIUM SERPL-MCNC: 2.4 MG/DL (ref 1.6–2.6)
MCH RBC QN AUTO: 28.3 PG (ref 27–31)
MCHC RBC AUTO-ENTMCNC: 32.2 G/DL (ref 32–36)
MCV RBC AUTO: 88 FL (ref 82–98)
MONOCYTES # BLD AUTO: 0.5 K/UL (ref 0.3–1)
MONOCYTES NFR BLD: 5.5 % (ref 4–15)
NEUTROPHILS # BLD AUTO: 7.4 K/UL (ref 1.8–7.7)
NEUTROPHILS NFR BLD: 77.1 % (ref 38–73)
NRBC BLD-RTO: 0 /100 WBC
PLATELET # BLD AUTO: 293 K/UL (ref 150–450)
PMV BLD AUTO: 10.5 FL (ref 9.2–12.9)
POTASSIUM SERPL-SCNC: 5.5 MMOL/L (ref 3.5–5.1)
PROT SERPL-MCNC: 9.4 G/DL (ref 6–8.4)
RBC # BLD AUTO: 4.74 M/UL (ref 4–5.4)
SODIUM SERPL-SCNC: 136 MMOL/L (ref 136–145)
TROPONIN I SERPL DL<=0.01 NG/ML-MCNC: <0.03 NG/ML
WBC # BLD AUTO: 9.58 K/UL (ref 3.9–12.7)

## 2022-11-02 PROCEDURE — 80053 COMPREHEN METABOLIC PANEL: CPT | Performed by: STUDENT IN AN ORGANIZED HEALTH CARE EDUCATION/TRAINING PROGRAM

## 2022-11-02 PROCEDURE — 93005 ELECTROCARDIOGRAM TRACING: CPT | Performed by: INTERNAL MEDICINE

## 2022-11-02 PROCEDURE — 93010 ELECTROCARDIOGRAM REPORT: CPT | Mod: ,,, | Performed by: INTERNAL MEDICINE

## 2022-11-02 PROCEDURE — 99285 EMERGENCY DEPT VISIT HI MDM: CPT | Mod: 25

## 2022-11-02 PROCEDURE — G0378 HOSPITAL OBSERVATION PER HR: HCPCS

## 2022-11-02 PROCEDURE — 36415 COLL VENOUS BLD VENIPUNCTURE: CPT | Performed by: STUDENT IN AN ORGANIZED HEALTH CARE EDUCATION/TRAINING PROGRAM

## 2022-11-02 PROCEDURE — 84484 ASSAY OF TROPONIN QUANT: CPT | Performed by: STUDENT IN AN ORGANIZED HEALTH CARE EDUCATION/TRAINING PROGRAM

## 2022-11-02 PROCEDURE — 83735 ASSAY OF MAGNESIUM: CPT | Performed by: STUDENT IN AN ORGANIZED HEALTH CARE EDUCATION/TRAINING PROGRAM

## 2022-11-02 PROCEDURE — 83880 ASSAY OF NATRIURETIC PEPTIDE: CPT | Performed by: STUDENT IN AN ORGANIZED HEALTH CARE EDUCATION/TRAINING PROGRAM

## 2022-11-02 PROCEDURE — 82010 KETONE BODYS QUAN: CPT | Performed by: NURSE PRACTITIONER

## 2022-11-02 PROCEDURE — 25000003 PHARM REV CODE 250: Performed by: STUDENT IN AN ORGANIZED HEALTH CARE EDUCATION/TRAINING PROGRAM

## 2022-11-02 PROCEDURE — 82962 GLUCOSE BLOOD TEST: CPT

## 2022-11-02 PROCEDURE — 85025 COMPLETE CBC W/AUTO DIFF WBC: CPT | Performed by: STUDENT IN AN ORGANIZED HEALTH CARE EDUCATION/TRAINING PROGRAM

## 2022-11-02 PROCEDURE — 93010 EKG 12-LEAD: ICD-10-PCS | Mod: ,,, | Performed by: INTERNAL MEDICINE

## 2022-11-02 RX ORDER — IBUPROFEN 200 MG
24 TABLET ORAL
Status: DISCONTINUED | OUTPATIENT
Start: 2022-11-03 | End: 2022-11-04 | Stop reason: HOSPADM

## 2022-11-02 RX ORDER — CLOPIDOGREL BISULFATE 75 MG/1
75 TABLET ORAL DAILY
Status: DISCONTINUED | OUTPATIENT
Start: 2022-11-03 | End: 2022-11-03

## 2022-11-02 RX ORDER — HEPARIN SODIUM 5000 [USP'U]/ML
5000 INJECTION, SOLUTION INTRAVENOUS; SUBCUTANEOUS EVERY 8 HOURS
Status: DISCONTINUED | OUTPATIENT
Start: 2022-11-03 | End: 2022-11-03

## 2022-11-02 RX ORDER — SODIUM CHLORIDE 0.9 % (FLUSH) 0.9 %
10 SYRINGE (ML) INJECTION
Status: DISCONTINUED | OUTPATIENT
Start: 2022-11-03 | End: 2022-11-04 | Stop reason: HOSPADM

## 2022-11-02 RX ORDER — HYDRALAZINE HYDROCHLORIDE 50 MG/1
50 TABLET, FILM COATED ORAL EVERY 12 HOURS
Status: ON HOLD | COMMUNITY
End: 2022-11-04 | Stop reason: SDUPTHER

## 2022-11-02 RX ORDER — MINOXIDIL 2.5 MG/1
10 TABLET ORAL 2 TIMES DAILY
Status: DISCONTINUED | OUTPATIENT
Start: 2022-11-03 | End: 2022-11-04

## 2022-11-02 RX ORDER — ACETAMINOPHEN 325 MG/1
650 TABLET ORAL EVERY 4 HOURS PRN
Status: DISCONTINUED | OUTPATIENT
Start: 2022-11-03 | End: 2022-11-04 | Stop reason: HOSPADM

## 2022-11-02 RX ORDER — HYDRALAZINE HYDROCHLORIDE 20 MG/ML
10 INJECTION INTRAMUSCULAR; INTRAVENOUS EVERY 8 HOURS PRN
Status: DISCONTINUED | OUTPATIENT
Start: 2022-11-03 | End: 2022-11-03

## 2022-11-02 RX ORDER — MINOXIDIL 10 MG/1
10 TABLET ORAL 2 TIMES DAILY
COMMUNITY
End: 2024-01-22 | Stop reason: DRUGHIGH

## 2022-11-02 RX ORDER — ACETAMINOPHEN 325 MG/1
325 TABLET ORAL EVERY 6 HOURS
COMMUNITY

## 2022-11-02 RX ORDER — CETIRIZINE HYDROCHLORIDE 10 MG/1
10 TABLET ORAL EVERY OTHER DAY
Status: DISCONTINUED | OUTPATIENT
Start: 2022-11-03 | End: 2022-11-04 | Stop reason: HOSPADM

## 2022-11-02 RX ORDER — GLUCAGON 1 MG
1 KIT INJECTION
Status: DISCONTINUED | OUTPATIENT
Start: 2022-11-03 | End: 2022-11-04 | Stop reason: HOSPADM

## 2022-11-02 RX ORDER — GABAPENTIN 100 MG/1
100 CAPSULE ORAL 2 TIMES DAILY
Status: DISCONTINUED | OUTPATIENT
Start: 2022-11-03 | End: 2022-11-04 | Stop reason: HOSPADM

## 2022-11-02 RX ORDER — HYDRALAZINE HYDROCHLORIDE 25 MG/1
50 TABLET, FILM COATED ORAL EVERY 12 HOURS
Status: DISCONTINUED | OUTPATIENT
Start: 2022-11-03 | End: 2022-11-04

## 2022-11-02 RX ORDER — IBUPROFEN 200 MG
16 TABLET ORAL
Status: DISCONTINUED | OUTPATIENT
Start: 2022-11-03 | End: 2022-11-04 | Stop reason: HOSPADM

## 2022-11-02 RX ORDER — ATORVASTATIN CALCIUM 40 MG/1
80 TABLET, FILM COATED ORAL DAILY
Status: DISCONTINUED | OUTPATIENT
Start: 2022-11-03 | End: 2022-11-04 | Stop reason: HOSPADM

## 2022-11-02 RX ORDER — INSULIN ASPART 100 [IU]/ML
0-5 INJECTION, SOLUTION INTRAVENOUS; SUBCUTANEOUS
Status: DISCONTINUED | OUTPATIENT
Start: 2022-11-03 | End: 2022-11-04 | Stop reason: HOSPADM

## 2022-11-02 RX ORDER — HYDRALAZINE HYDROCHLORIDE 25 MG/1
25 TABLET, FILM COATED ORAL ONCE
Status: COMPLETED | OUTPATIENT
Start: 2022-11-02 | End: 2022-11-02

## 2022-11-02 RX ADMIN — SODIUM ZIRCONIUM CYCLOSILICATE 10 G: 10 POWDER, FOR SUSPENSION ORAL at 10:11

## 2022-11-02 RX ADMIN — HYDRALAZINE HYDROCHLORIDE 25 MG: 25 TABLET, FILM COATED ORAL at 06:11

## 2022-11-02 NOTE — ED NOTES
Presents s/p syncopal episode during dialysis. Episode was witnessed by dialysis staff. Hypertension noted on arrival. Respirations even et unlabored, no distress noted. Will continue to monitor.

## 2022-11-03 LAB
ALBUMIN SERPL BCP-MCNC: 3.7 G/DL (ref 3.5–5.2)
ALP SERPL-CCNC: 79 U/L (ref 55–135)
ALT SERPL W/O P-5'-P-CCNC: 27 U/L (ref 10–44)
ANION GAP SERPL CALC-SCNC: 14 MMOL/L (ref 8–16)
ANION GAP SERPL CALC-SCNC: 15 MMOL/L (ref 8–16)
AST SERPL-CCNC: 16 U/L (ref 10–40)
BASOPHILS # BLD AUTO: 0.06 K/UL (ref 0–0.2)
BASOPHILS NFR BLD: 0.6 % (ref 0–1.9)
BILIRUB SERPL-MCNC: 0.7 MG/DL (ref 0.1–1)
BUN SERPL-MCNC: 70 MG/DL (ref 6–20)
BUN SERPL-MCNC: 73 MG/DL (ref 6–20)
CALCIUM SERPL-MCNC: 9.4 MG/DL (ref 8.7–10.5)
CALCIUM SERPL-MCNC: 9.7 MG/DL (ref 8.7–10.5)
CHLORIDE SERPL-SCNC: 92 MMOL/L (ref 95–110)
CHLORIDE SERPL-SCNC: 93 MMOL/L (ref 95–110)
CO2 SERPL-SCNC: 29 MMOL/L (ref 23–29)
CO2 SERPL-SCNC: 30 MMOL/L (ref 23–29)
CREAT SERPL-MCNC: 6.7 MG/DL (ref 0.5–1.4)
CREAT SERPL-MCNC: 7 MG/DL (ref 0.5–1.4)
DIFFERENTIAL METHOD: ABNORMAL
EOSINOPHIL # BLD AUTO: 0.3 K/UL (ref 0–0.5)
EOSINOPHIL NFR BLD: 2.9 % (ref 0–8)
ERYTHROCYTE [DISTWIDTH] IN BLOOD BY AUTOMATED COUNT: 14.2 % (ref 11.5–14.5)
EST. GFR  (NO RACE VARIABLE): 6.4 ML/MIN/1.73 M^2
EST. GFR  (NO RACE VARIABLE): 6.7 ML/MIN/1.73 M^2
GLUCOSE SERPL-MCNC: 196 MG/DL (ref 70–110)
GLUCOSE SERPL-MCNC: 204 MG/DL (ref 70–110)
GLUCOSE SERPL-MCNC: 209 MG/DL (ref 70–110)
GLUCOSE SERPL-MCNC: 217 MG/DL (ref 70–110)
GLUCOSE SERPL-MCNC: 270 MG/DL (ref 70–110)
GLUCOSE SERPL-MCNC: 277 MG/DL (ref 70–110)
HCT VFR BLD AUTO: 35.6 % (ref 37–48.5)
HGB BLD-MCNC: 11.1 G/DL (ref 12–16)
IMM GRANULOCYTES # BLD AUTO: 0.03 K/UL (ref 0–0.04)
IMM GRANULOCYTES NFR BLD AUTO: 0.3 % (ref 0–0.5)
LYMPHOCYTES # BLD AUTO: 2.3 K/UL (ref 1–4.8)
LYMPHOCYTES NFR BLD: 21.4 % (ref 18–48)
MAGNESIUM SERPL-MCNC: 2.3 MG/DL (ref 1.6–2.6)
MCH RBC QN AUTO: 28.5 PG (ref 27–31)
MCHC RBC AUTO-ENTMCNC: 31.2 G/DL (ref 32–36)
MCV RBC AUTO: 92 FL (ref 82–98)
MONOCYTES # BLD AUTO: 0.8 K/UL (ref 0.3–1)
MONOCYTES NFR BLD: 7.5 % (ref 4–15)
NEUTROPHILS # BLD AUTO: 7.2 K/UL (ref 1.8–7.7)
NEUTROPHILS NFR BLD: 67.3 % (ref 38–73)
NRBC BLD-RTO: 0 /100 WBC
PHOSPHATE SERPL-MCNC: 7.6 MG/DL (ref 2.7–4.5)
PLATELET # BLD AUTO: 244 K/UL (ref 150–450)
PMV BLD AUTO: 10.9 FL (ref 9.2–12.9)
POTASSIUM SERPL-SCNC: 4.6 MMOL/L (ref 3.5–5.1)
POTASSIUM SERPL-SCNC: 4.7 MMOL/L (ref 3.5–5.1)
PROT SERPL-MCNC: 7.3 G/DL (ref 6–8.4)
RBC # BLD AUTO: 3.89 M/UL (ref 4–5.4)
SODIUM SERPL-SCNC: 136 MMOL/L (ref 136–145)
SODIUM SERPL-SCNC: 137 MMOL/L (ref 136–145)
TROPONIN I SERPL DL<=0.01 NG/ML-MCNC: <0.03 NG/ML
TROPONIN I SERPL DL<=0.01 NG/ML-MCNC: <0.03 NG/ML
TSH SERPL DL<=0.005 MIU/L-ACNC: 5 UIU/ML (ref 0.34–5.6)
WBC # BLD AUTO: 10.68 K/UL (ref 3.9–12.7)

## 2022-11-03 PROCEDURE — 83735 ASSAY OF MAGNESIUM: CPT | Performed by: NURSE PRACTITIONER

## 2022-11-03 PROCEDURE — 96372 THER/PROPH/DIAG INJ SC/IM: CPT | Mod: 59 | Performed by: NURSE PRACTITIONER

## 2022-11-03 PROCEDURE — 84100 ASSAY OF PHOSPHORUS: CPT | Performed by: NURSE PRACTITIONER

## 2022-11-03 PROCEDURE — 84484 ASSAY OF TROPONIN QUANT: CPT | Mod: 91 | Performed by: NURSE PRACTITIONER

## 2022-11-03 PROCEDURE — 25000003 PHARM REV CODE 250: Performed by: INTERNAL MEDICINE

## 2022-11-03 PROCEDURE — 90935 HEMODIALYSIS ONE EVALUATION: CPT

## 2022-11-03 PROCEDURE — G0378 HOSPITAL OBSERVATION PER HR: HCPCS

## 2022-11-03 PROCEDURE — 85025 COMPLETE CBC W/AUTO DIFF WBC: CPT | Performed by: NURSE PRACTITIONER

## 2022-11-03 PROCEDURE — 80053 COMPREHEN METABOLIC PANEL: CPT | Performed by: NURSE PRACTITIONER

## 2022-11-03 PROCEDURE — G0257 UNSCHED DIALYSIS ESRD PT HOS: HCPCS

## 2022-11-03 PROCEDURE — 36415 COLL VENOUS BLD VENIPUNCTURE: CPT | Performed by: NURSE PRACTITIONER

## 2022-11-03 PROCEDURE — 25000003 PHARM REV CODE 250: Performed by: NURSE PRACTITIONER

## 2022-11-03 PROCEDURE — 96372 THER/PROPH/DIAG INJ SC/IM: CPT | Performed by: NURSE PRACTITIONER

## 2022-11-03 PROCEDURE — 25000003 PHARM REV CODE 250: Performed by: HOSPITALIST

## 2022-11-03 PROCEDURE — 63600175 PHARM REV CODE 636 W HCPCS: Mod: GY | Performed by: NURSE PRACTITIONER

## 2022-11-03 PROCEDURE — 84484 ASSAY OF TROPONIN QUANT: CPT | Performed by: NURSE PRACTITIONER

## 2022-11-03 PROCEDURE — 80048 BASIC METABOLIC PNL TOTAL CA: CPT | Performed by: NURSE PRACTITIONER

## 2022-11-03 PROCEDURE — 84443 ASSAY THYROID STIM HORMONE: CPT | Performed by: NURSE PRACTITIONER

## 2022-11-03 RX ORDER — GUAIFENESIN/DEXTROMETHORPHAN 100-10MG/5
5 SYRUP ORAL EVERY 4 HOURS PRN
Status: DISCONTINUED | OUTPATIENT
Start: 2022-11-03 | End: 2022-11-04 | Stop reason: HOSPADM

## 2022-11-03 RX ORDER — CLOPIDOGREL BISULFATE 75 MG/1
75 TABLET ORAL DAILY
Status: DISCONTINUED | OUTPATIENT
Start: 2022-11-04 | End: 2022-11-03

## 2022-11-03 RX ORDER — HYDRALAZINE HYDROCHLORIDE 20 MG/ML
5 INJECTION INTRAMUSCULAR; INTRAVENOUS EVERY 4 HOURS PRN
Status: DISCONTINUED | OUTPATIENT
Start: 2022-11-03 | End: 2022-11-04 | Stop reason: HOSPADM

## 2022-11-03 RX ORDER — SODIUM CHLORIDE 9 MG/ML
INJECTION, SOLUTION INTRAVENOUS
Status: DISCONTINUED | OUTPATIENT
Start: 2022-11-03 | End: 2022-11-04 | Stop reason: HOSPADM

## 2022-11-03 RX ORDER — SODIUM CHLORIDE 9 MG/ML
INJECTION, SOLUTION INTRAVENOUS ONCE
Status: DISCONTINUED | OUTPATIENT
Start: 2022-11-03 | End: 2022-11-04 | Stop reason: HOSPADM

## 2022-11-03 RX ORDER — HEPARIN SODIUM 5000 [USP'U]/ML
5000 INJECTION, SOLUTION INTRAVENOUS; SUBCUTANEOUS
Status: DISCONTINUED | OUTPATIENT
Start: 2022-11-03 | End: 2022-11-03

## 2022-11-03 RX ORDER — DOXYCYCLINE 100 MG/1
100 CAPSULE ORAL EVERY 12 HOURS
Status: DISCONTINUED | OUTPATIENT
Start: 2022-11-03 | End: 2022-11-04 | Stop reason: HOSPADM

## 2022-11-03 RX ORDER — CLOPIDOGREL BISULFATE 75 MG/1
75 TABLET ORAL DAILY
Status: DISCONTINUED | OUTPATIENT
Start: 2022-11-03 | End: 2022-11-04 | Stop reason: HOSPADM

## 2022-11-03 RX ORDER — HEPARIN SODIUM 1000 [USP'U]/ML
5000 INJECTION, SOLUTION INTRAVENOUS; SUBCUTANEOUS
Status: DISCONTINUED | OUTPATIENT
Start: 2022-11-03 | End: 2022-11-04 | Stop reason: HOSPADM

## 2022-11-03 RX ORDER — HYDRALAZINE HYDROCHLORIDE 20 MG/ML
10 INJECTION INTRAMUSCULAR; INTRAVENOUS EVERY 4 HOURS PRN
Status: DISCONTINUED | OUTPATIENT
Start: 2022-11-03 | End: 2022-11-04 | Stop reason: HOSPADM

## 2022-11-03 RX ORDER — MUPIROCIN 20 MG/G
OINTMENT TOPICAL 2 TIMES DAILY
Status: DISCONTINUED | OUTPATIENT
Start: 2022-11-03 | End: 2022-11-04 | Stop reason: HOSPADM

## 2022-11-03 RX ADMIN — GUAIFENESIN AND DEXTROMETHORPHAN 5 ML: 100; 10 SYRUP ORAL at 08:11

## 2022-11-03 RX ADMIN — DOXYCYCLINE HYCLATE 100 MG: 100 CAPSULE ORAL at 08:11

## 2022-11-03 RX ADMIN — HYDRALAZINE HYDROCHLORIDE 50 MG: 25 TABLET ORAL at 08:11

## 2022-11-03 RX ADMIN — MUPIROCIN 1 G: 20 OINTMENT TOPICAL at 08:11

## 2022-11-03 RX ADMIN — ATORVASTATIN CALCIUM 80 MG: 40 TABLET, FILM COATED ORAL at 08:11

## 2022-11-03 RX ADMIN — INSULIN ASPART 2 UNITS: 100 INJECTION, SOLUTION INTRAVENOUS; SUBCUTANEOUS at 08:11

## 2022-11-03 RX ADMIN — MINOXIDIL 10 MG: 2.5 TABLET ORAL at 08:11

## 2022-11-03 RX ADMIN — GABAPENTIN 100 MG: 100 CAPSULE ORAL at 08:11

## 2022-11-03 RX ADMIN — CLOPIDOGREL BISULFATE 75 MG: 75 TABLET, FILM COATED ORAL at 08:11

## 2022-11-03 RX ADMIN — INSULIN DETEMIR 7 UNITS: 100 INJECTION, SOLUTION SUBCUTANEOUS at 08:11

## 2022-11-03 RX ADMIN — CETIRIZINE HYDROCHLORIDE 10 MG: 10 TABLET, FILM COATED ORAL at 08:11

## 2022-11-03 RX ADMIN — INSULIN ASPART 2 UNITS: 100 INJECTION, SOLUTION INTRAVENOUS; SUBCUTANEOUS at 12:11

## 2022-11-03 NOTE — ED PROVIDER NOTES
Encounter Date: 11/2/2022       History     Chief Complaint   Patient presents with    syncopal episode during dialysis     HPI  57-year-old with history of ESRD on HD, coronary disease, peripheral artery disease presents complaining of syncope.  Patient states that she was at her routine scheduled dialysis today and tolerated about an hour of the session and then felt lightheaded immediately lost consciousness.  She was unconscious for less than a minute, her eyes were deviated upwards.  She regained consciousness and then had a headache and had 2 episodes of nonbloody emesis.  She was then picked up by EMS and brought to the emergency department.  Fingerstick glucose reportedly normal.     States that this has never happened before to her.  Denies chest pain or shortness of breath or hemoptysis or worsening leg swelling.  Denies fever chills.    Review of patient's allergies indicates:   Allergen Reactions    Dilaudid [hydromorphone] Nausea And Vomiting    Chlorhexidine Itching    Lortab [hydrocodone-acetaminophen] Itching     Past Medical History:   Diagnosis Date    A-fib     resolved per patient    Anemia due to end stage renal disease 6/30/2022    Arthritis     Bronchitis     Cardiovascular event risk, ASCVD 10-year risk 6.7% 10/15/2016    Diabetes mellitus type II     Diabetic foot infection     Diabetic ulcer of left midfoot 05/05/2022    Disorder of kidney and ureter     Encounter for blood transfusion     ESRD (end stage renal disease) 05/18/2018    MANJINDER (generalized anxiety disorder) 10/25/2016    History of colon polyps 11/02/2016    Hyperlipidemia     Hypertension     Stroke      Past Surgical History:   Procedure Laterality Date    ANGIOGRAPHY OF LOWER EXTREMITY Left 10/18/2022    Procedure: Angiogram Extremity Unilateral;  Surgeon: Ali Khoobehi, MD;  Location: [facility] CATH/EP LAB;  Service: Vascular;  Laterality: Left;    AV FISTULA PLACEMENT Left 8/29/2022    Procedure: CREATION, AV FISTULA;  Surgeon:  Ali Khoobehi, MD;  Location: [facility] OR;  Service: Vascular;  Laterality: Left;    BONE BIOPSY Left 8/24/2022    Procedure: Biopsy-Bone;  Surgeon: Porfirio Ponce DPM;  Location: [facility] OR;  Service: Podiatry;  Laterality: Left;    COLONOSCOPY N/A 10/5/2016    Procedure: COLONOSCOPY;  Surgeon: Pillo Chanel MD;  Location: [facility] ENDO;  Service: Endoscopy;  Laterality: N/A;    COLONOSCOPY N/A 4/26/2022    Procedure: COLONOSCOPY;  Surgeon: Saravanan Rachel MD;  Location: [facility] ENDO;  Service: Endoscopy;  Laterality: N/A;    FISTULOGRAM Left 8/24/2022    Procedure: Fistulogram;  Surgeon: Ali Khoobehi, MD;  Location: [facility] CATH/EP LAB;  Service: Vascular;  Laterality: Left;    HERNIA REPAIR Bilateral 11/22/2016    inguinal    HYSTERECTOMY      INCISION AND DRAINAGE FOOT Left 5/13/2022    Procedure: INCISION AND DRAINAGE, FOOT;  Surgeon: Ricardo Bruno DPM;  Location: [facility] OR;  Service: Podiatry;  Laterality: Left;    OOPHORECTOMY      THROMBECTOMY, AV FISTULA, UPPER EXTREMITY, PERCUTANEOUS  8/24/2022    Procedure: THROMBECTOMY, AV FISTULA, UPPER EXTREMITY, PERCUTANEOUS;  Surgeon: Ali Khoobehi, MD;  Location: [facility] CATH/EP LAB;  Service: Vascular;;    TOE AMPUTATION Left 8/26/2022    Procedure: AMPUTATION, TOE;  Surgeon: Porfirio Ponce DPM;  Location: [facility] OR;  Service: Podiatry;  Laterality: Left;     Family History   Problem Relation Age of Onset    Hyperlipidemia Mother     Hypertension Mother     Hypertension Father     Diabetes Father     Diabetes Sister     Diabetes Sister     Diabetes Maternal Aunt     Diabetes Maternal Grandmother     Heart disease Maternal Grandmother     Cancer Paternal Grandmother     Breast cancer Neg Hx     Colon cancer Neg Hx     Ovarian cancer Neg Hx      Social History     Tobacco Use    Smoking status: Never    Smokeless tobacco: Never   Substance Use Topics    Alcohol use: No    Drug use: No     Review of Systems   Constitutional:  Negative for fever.   HENT:  Negative for sore  throat.    Respiratory:  Negative for shortness of breath.    Cardiovascular:  Negative for chest pain.   Gastrointestinal:  Negative for nausea.   Genitourinary:  Negative for dysuria.   Musculoskeletal:  Negative for back pain.   Skin:  Negative for rash.   Neurological:  Negative for weakness.   Hematological:  Does not bruise/bleed easily.     Physical Exam     Initial Vitals [11/02/22 1542]   BP Pulse Resp Temp SpO2   (!) 188/71 61 18 97.7 °F (36.5 °C) 97 %      MAP       --         Physical Exam    HENT:   Head: Normocephalic. Hair is normal.   Eyes: Conjunctivae and EOM are normal.   Neck: Neck supple. No stridor present.   Cardiovascular:  Normal rate.           No murmur heard.  Left upper extremity AV fistula noted with good thrill   Pulmonary/Chest: Breath sounds normal. No respiratory distress.   Abdominal: Abdomen is soft. There is no abdominal tenderness.   Musculoskeletal:         General: Normal range of motion.      Cervical back: Neck supple.      Comments: Wound VAC in place on left foot, right lower extremity with no obvious wounds, both extremities are warm and well perfused     Neurological: She is alert and oriented to person, place, and time.   Skin: Skin is warm and dry.   Psychiatric: She has a normal mood and affect. Her speech is normal. Thought content normal.       ED Course   Procedures  PGY4 MDM:   57-year-old with history of ESRD, coronary artery disease, peripheral artery disease presents complaining of syncopal event while on dialysis.  She tolerated 1 hour today before she had her event was taken off.  She takes aspirin Plavix.  She last tolerated dialysis on Monday and had a full session at that time.  Denies chest pain or shortness of breath or previous syncopal episodes.  Vitals on arrival significant for hypertension around 230 systolic, satting well on room air.  Not tachypneic.  On exam she is alert and conversational, pupils are 2 mm and reactive to light, her neurologic  exam is otherwise unremarkable.  Put in for syncope workup including collapse, cardiac biomarkers, electrolytes, BNP, chest x-ray, EKG.  Put in for CT head without contrast because of syncopal event on anticoagulation with emesis and headache afterwards.  This workup was significant for hyperkalemia with potassium of 5.5.EKG normal sinus rate, normal sinus rhythm, no AV blocks, QRS narrow, no ST changes or Q-waves concerning for ischemia.  Troponin negative, BNP mildly elevated unreliable sign in renal disease.  Electrolytes within normal limits otherwise potassium.  Chest x-ray within normal limits.  CT head without bleed.  Given patient's numerous cardiac risk factors and concerning syncopal Story will admit for observation on telemetry overnight to monitor for possible arrhythmia.    Austin Loomis MD   [facility] [address]  [phone]  8:41 PM 11/2/2022     Labs Reviewed   CBC W/ AUTO DIFFERENTIAL - Abnormal; Notable for the following components:       Result Value    Gran % 77.1 (*)     Lymph % 14.1 (*)     All other components within normal limits   COMPREHENSIVE METABOLIC PANEL - Abnormal; Notable for the following components:    Potassium 5.5 (*)     Chloride 90 (*)     Glucose 225 (*)     BUN 62 (*)     Creatinine 6.1 (*)     Total Protein 9.4 (*)     Anion Gap 17 (*)     eGFR 7.5 (*)     All other components within normal limits   B-TYPE NATRIURETIC PEPTIDE - Abnormal; Notable for the following components:     (*)     All other components within normal limits   POCT GLUCOSE - Abnormal; Notable for the following components:    POC Glucose 192 (*)     All other components within normal limits   TROPONIN I   MAGNESIUM   B-TYPE NATRIURETIC PEPTIDE   BETA - HYDROXYBUTYRATE, SERUM        ECG Results              EKG 12-lead (Final result)  Result time 11/02/22 21:15:00      Final result by Interface, Lab In [facility] (11/02/22 21:15:00)                   Narrative:     Test Reason : R55,    Vent. Rate : 067 BPM     Atrial Rate : 067 BPM     P-R Int : 122 ms          QRS Dur : 084 ms      QT Int : 440 ms       P-R-T Axes : 033 -33 083 degrees     QTc Int : 464 ms    Normal sinus rhythm  Left axis deviation  Nonspecific ST abnormality  Abnormal ECG  When compared with ECG of 17-OCT-2022 20:06,  No significant change was found  Confirmed by Salomon Adame MD ([id number]) on 11/2/2022 9:14:50 PM    Referred By:             Confirmed By:Salomon Adame MD                                  Imaging Results              CT Head Without Contrast (Final result)  Result time 11/02/22 18:45:49      Final result by Joseph Stephens MD (11/02/22 18:45:49)                   Narrative:    CT HEAD WITHOUT CONTRAST    CMS MANDATED QUALITY DATA - CT RADIATION  436    All CT scans at this facility utilize dose modulation, iterative reconstruction, and/or weight based dosing when appropriate to reduce radiation dose to as low as reasonably achievable    Clinical data: Syncope.    FINDINGS: Noninfusion images were obtained from the skull base to the vertex. There is no intracranial mass, hemorrhage, or midline shift. Ventricles and sulci are within normal limits. There are no pathologic extra-axial fluid collections.    There is no evidence of cortical ischemic change. Changes of mild chronic microvascular white matter disease are noted. Cerebellum and brainstem are normal. The calvarium is intact. There is partial opacification of the right sphenoid sinus.    IMPRESSION:    1. No acute intracranial abnormalities.  2. Minimal periventricular low-attenuation suggesting mild chronic small vessel ischemic change.  3. Chronic right sphenoid sinusitis.        Electronically signed by:  Joseph Stephens MD  11/2/2022 6:45 PM CDT Workstation: [id number]                                     X-Ray Chest AP Portable (Final result)  Result time 11/02/22 17:34:49      Final result by Joseph Stephens MD (11/02/22  17:34:49)                   Narrative:    Chest single view    CLINICAL DATA: Syncopal episode    FINDINGS: Heart size is normal. The mediastinum is unremarkable. The left lung is clear. There is mild atelectasis at the right lung base. No effusion is identified.    IMPRESSION:  1. Mild right basilar atelectasis.  2. No other significant findings.    Electronically signed by:  Joseph Stephens MD  11/2/2022 5:34 PM CDT Workstation: [id number]                                     Medications   hydrALAZINE tablet 25 mg (25 mg Oral Given 11/2/22 1815)   sodium zirconium cyclosilicate packet 10 g (10 g Oral Given 11/2/22 2245)                Attending Attestation:   Physician Attestation Statement for Resident:  As the supervising MD   Physician Attestation Statement: I have personally seen and examined this patient.   I agree with the above history.  -:   As the supervising MD I agree with the above PE.      I have reviewed and agree with the residents interpretation of the following: lab data, x-rays and CT scans.                            Clinical Impression:   Final diagnoses:  [R55] Syncope (Primary)        ED Disposition Condition    Observation Stable                Abelardo Loomis MD  Resident  11/02/22 1720       Misti Shay MD  11/13/22 2113

## 2022-11-03 NOTE — SUBJECTIVE & OBJECTIVE
Past Medical History:   Diagnosis Date    A-fib     resolved per patient    Anemia due to end stage renal disease 6/30/2022    Arthritis     Bronchitis     Cardiovascular event risk, ASCVD 10-year risk 6.7% 10/15/2016    Diabetes mellitus type II     Diabetic foot infection     Diabetic ulcer of left midfoot 05/05/2022    Disorder of kidney and ureter     Encounter for blood transfusion     ESRD (end stage renal disease) 05/18/2018    MANJINDER (generalized anxiety disorder) 10/25/2016    History of colon polyps 11/02/2016    Hyperlipidemia     Hypertension     Stroke        Past Surgical History:   Procedure Laterality Date    ANGIOGRAPHY OF LOWER EXTREMITY Left 10/18/2022    Procedure: Angiogram Extremity Unilateral;  Surgeon: Ali Khoobehi, MD;  Location: [facility] CATH/EP LAB;  Service: Vascular;  Laterality: Left;    AV FISTULA PLACEMENT Left 8/29/2022    Procedure: CREATION, AV FISTULA;  Surgeon: Ali Khoobehi, MD;  Location: [facility] OR;  Service: Vascular;  Laterality: Left;    BONE BIOPSY Left 8/24/2022    Procedure: Biopsy-Bone;  Surgeon: Porfirio Ponce DPM;  Location: [facility] OR;  Service: Podiatry;  Laterality: Left;    COLONOSCOPY N/A 10/5/2016    Procedure: COLONOSCOPY;  Surgeon: Pillo Chanel MD;  Location: [facility] ENDO;  Service: Endoscopy;  Laterality: N/A;    COLONOSCOPY N/A 4/26/2022    Procedure: COLONOSCOPY;  Surgeon: Saravanan Rachel MD;  Location: [facility] ENDO;  Service: Endoscopy;  Laterality: N/A;    FISTULOGRAM Left 8/24/2022    Procedure: Fistulogram;  Surgeon: Ali Khoobehi, MD;  Location: [facility] CATH/EP LAB;  Service: Vascular;  Laterality: Left;    HERNIA REPAIR Bilateral 11/22/2016    inguinal    HYSTERECTOMY      INCISION AND DRAINAGE FOOT Left 5/13/2022    Procedure: INCISION AND DRAINAGE, FOOT;  Surgeon: Ricardo Bruno DPM;  Location: [facility] OR;  Service: Podiatry;  Laterality: Left;    OOPHORECTOMY      THROMBECTOMY, AV FISTULA, UPPER EXTREMITY, PERCUTANEOUS  8/24/2022    Procedure: THROMBECTOMY, AV  FISTULA, UPPER EXTREMITY, PERCUTANEOUS;  Surgeon: Ali Khoobehi, MD;  Location: [facility] CATH/EP LAB;  Service: Vascular;;    TOE AMPUTATION Left 8/26/2022    Procedure: AMPUTATION, TOE;  Surgeon: Porfirio Ponce DPM;  Location: [facility] OR;  Service: Podiatry;  Laterality: Left;       Review of patient's allergies indicates:   Allergen Reactions    Dilaudid [hydromorphone] Nausea And Vomiting    Chlorhexidine Itching    Lortab [hydrocodone-acetaminophen] Itching       No current facility-administered medications on file prior to encounter.     Current Outpatient Medications on File Prior to Encounter   Medication Sig    atorvastatin (LIPITOR) 80 MG tablet Take 1 tablet (80 mg total) by mouth once daily.    blood sugar diagnostic Strp To check BG 4 times daily, to use with insurance preferred meter (Patient taking differently: 1 strip by Misc.(Non-Drug; Combo Route) route 4 (four) times daily. To check BG 4 times daily, to use with insurance preferred meter)    cetirizine (ZYRTEC) 10 MG tablet Take 1 tablet (10 mg total) by mouth every other day.    clopidogreL (PLAVIX) 75 mg tablet Take 1 tablet (75 mg total) by mouth once daily.    fluticasone propionate (FLONASE) 50 mcg/actuation nasal spray 2 sprays (100 mcg total) by Each Nostril route once daily. (Patient taking differently: 2 sprays by Each Nostril route 2 (two) times a day.)    gabapentin (NEURONTIN) 100 MG capsule Take 1 capsule (100 mg total) by mouth 2 (two) times daily.    hydrALAZINE (APRESOLINE) 50 MG tablet Take 50 mg by mouth every 12 (twelve) hours.    insulin aspart U-100 (NOVOLOG FLEXPEN U-100 INSULIN) 100 unit/mL (3 mL) InPn pen Inject 5 Units into the skin 3 (three) times daily with meals. (Patient taking differently: Inject 3 Units into the skin 3 (three) times daily with meals. Sliding scale)    insulin detemir U-100 (LEVEMIR) 100 unit/mL injection Inject 15 Units into the skin every evening.    minoxidiL (LONITEN) 10 MG Tab Take 10 mg by mouth 2  "(two) times daily.    pen needle, diabetic (BD ULTRA-FINE ROBERT PEN NEEDLE) 32 gauge x 5/32" Ndle To use 4 times per day with insulin injections. (Patient taking differently: 1 pen by Misc.(Non-Drug; Combo Route) route 4 (four) times daily. To use 4 times per day with insulin injections.)    acetaminophen (TYLENOL) 325 MG tablet Take 325 mg by mouth every 6 (six) hours.    doxycycline (MONODOX) 100 MG capsule Take 1 capsule (100 mg total) by mouth 2 (two) times daily. for 14 days    epoetin chris-epbx (RETACRIT) 4,000 unit/mL injection Inject 1.11 mLs (4,440 Units total) into the skin every Mon, Wed, Fri.    lactulose (CHRONULAC) 10 gram/15 mL solution Take 20 g by mouth 2 (two) times a day.    ondansetron (ZOFRAN-ODT) 8 MG TbDL Take 1 tablet (8 mg total) by mouth every 8 (eight) hours as needed (nausea).    oxyCODONE (ROXICODONE) 5 MG immediate release tablet Take 1 tablet (5 mg total) by mouth every 6 (six) hours as needed for Pain.    polyethylene glycol (GLYCOLAX) 17 gram PwPk Take 17 g by mouth 2 (two) times daily as needed.    senna-docusate 8.6-50 mg (PERICOLACE) 8.6-50 mg per tablet Take 1 tablet by mouth 2 (two) times daily.    [DISCONTINUED] amLODIPine (NORVASC) 10 MG tablet Take 1 tablet (10 mg total) by mouth once daily.    [DISCONTINUED] aspirin (ECOTRIN) 81 MG EC tablet Take 81 mg by mouth once daily.    [DISCONTINUED] azelastine (ASTELIN) 137 mcg (0.1 %) nasal spray 1 spray (137 mcg total) by Nasal route 2 (two) times a day.    [DISCONTINUED] blood-glucose meter (ACCU-CHEK KAYLIE PLUS METER) Misc To use to check blood sugars 4 times a day    [DISCONTINUED] calcium acetate,phosphat bind, (PHOSLO) 667 mg capsule Take 1 capsule (667 mg total) by mouth 3 (three) times daily with meals.    [DISCONTINUED] clorazepate (TRANXENE) 3.75 MG Tab Take 7.5 mg by mouth nightly as needed.     [DISCONTINUED] dextrose (GLUCOSE GEL) 40 % gel Take 37.5 mLs (15,000 mg total) by mouth once as needed (hypoglycemia).    " [DISCONTINUED] ezetimibe (ZETIA) 10 mg tablet Take 1 tablet (10 mg total) by mouth once daily.    [DISCONTINUED] fenofibrate 160 MG Tab Take 1 tablet (160 mg total) by mouth once daily.    [DISCONTINUED] lancing device with lancets (ACCU-CHEK SOFT DEV LANCETS) Kit To check blood sugars 4 times per day    [DISCONTINUED] pantoprazole (PROTONIX) 40 MG tablet Take 1 tablet (40 mg total) by mouth once daily.     Family History       Problem Relation (Age of Onset)    Cancer Paternal Grandmother    Diabetes Father, Sister, Sister, Maternal Aunt, Maternal Grandmother    Heart disease Maternal Grandmother    Hyperlipidemia Mother    Hypertension Mother, Father          Tobacco Use    Smoking status: Never    Smokeless tobacco: Never   Substance and Sexual Activity    Alcohol use: No    Drug use: No    Sexual activity: Yes     Partners: Male     Review of Systems   Constitutional:  Negative for chills, diaphoresis and fever.   HENT:  Negative for congestion, postnasal drip, sinus pressure and sore throat.    Eyes:  Negative for visual disturbance.   Respiratory:  Negative for cough, chest tightness, shortness of breath and wheezing.    Cardiovascular:  Negative for chest pain, palpitations and leg swelling.   Gastrointestinal:  Positive for vomiting. Negative for abdominal distention, abdominal pain, blood in stool, constipation, diarrhea and nausea.   Endocrine: Negative.    Genitourinary:  Negative for dysuria.   Musculoskeletal: Negative.    Skin: Negative.    Allergic/Immunologic: Negative.    Neurological:  Positive for syncope. Negative for dizziness, weakness, numbness and headaches.   Hematological: Negative.    Psychiatric/Behavioral: Negative.     Objective:     Vital Signs (Most Recent):  Temp: 97.7 °F (36.5 °C) (11/02/22 1542)  Pulse: 61 (11/02/22 2200)  Resp: 15 (11/02/22 2200)  BP: (!) 167/74 (11/02/22 2200)  SpO2: 96 % (11/02/22 2200)   Vital Signs (24h Range):  Temp:  [97.7 °F (36.5 °C)] 97.7 °F (36.5  °C)  Pulse:  [61-68] 61  Resp:  [15-37] 15  SpO2:  [94 %-98 %] 96 %  BP: (167-230)/(71-97) 167/74     Weight: 81.3 kg (179 lb 3.7 oz)  Body mass index is 27.25 kg/m².    Physical Exam  Constitutional:       General: She is not in acute distress.     Appearance: Normal appearance. She is well-developed. She is not toxic-appearing or diaphoretic.   HENT:      Head: Normocephalic and atraumatic.   Eyes:      General: Lids are normal.      Conjunctiva/sclera: Conjunctivae normal.      Pupils: Pupils are equal, round, and reactive to light.   Neck:      Thyroid: No thyroid mass or thyromegaly.      Vascular: Normal carotid pulses. No JVD.      Trachea: Trachea normal. No tracheal deviation.   Cardiovascular:      Rate and Rhythm: Normal rate and regular rhythm.      Pulses: Normal pulses.      Heart sounds: Normal heart sounds, S1 normal and S2 normal.   Pulmonary:      Effort: Pulmonary effort is normal.      Breath sounds: Normal breath sounds. No stridor.   Abdominal:      General: Bowel sounds are normal.      Palpations: Abdomen is soft.      Tenderness: There is no abdominal tenderness.   Musculoskeletal:         General: Normal range of motion.      Cervical back: Full passive range of motion without pain, normal range of motion and neck supple.   Feet:      Comments: amputation of 1st toe and partial 1st metatarsal head amputation left foot  Skin:     General: Skin is warm and dry.      Findings: Wound (left foot wound with wound vaac in place, dressing intact) present.      Nails: There is no clubbing.      Comments: Left arm AVF   Neurological:      Mental Status: She is alert and oriented to person, place, and time.      GCS: GCS eye subscore is 4. GCS verbal subscore is 5. GCS motor subscore is 6.      Cranial Nerves: No cranial nerve deficit.      Sensory: No sensory deficit.   Psychiatric:         Speech: Speech normal.         Behavior: Behavior normal. Behavior is cooperative.         Thought Content:  Thought content normal.         Judgment: Judgment normal.         CRANIAL NERVES     CN III, IV, VI   Pupils are equal, round, and reactive to light.     Significant Labs: All pertinent labs within the past 24 hours have been reviewed.  A1C:   Recent Labs   Lab 05/29/22  1324 06/22/22  0618 10/17/22  1801   HGBA1C 8.3* 7.5* 7.2*     Bilirubin:   Recent Labs   Lab 10/17/22  1801 11/02/22  1625   BILITOT 0.7 0.5     BMP:   Recent Labs   Lab 11/02/22  1625   *      K 5.5*   CL 90*   CO2 29   BUN 62*   CREATININE 6.1*   CALCIUM 10.3   MG 2.4     CBC:   Recent Labs   Lab 11/02/22  1625   WBC 9.58   HGB 13.4   HCT 41.6        CMP:   Recent Labs   Lab 11/02/22  1625      K 5.5*   CL 90*   CO2 29   *   BUN 62*   CREATININE 6.1*   CALCIUM 10.3   PROT 9.4*   ALBUMIN 4.7   BILITOT 0.5   ALKPHOS 105   AST 33   ALT 39   ANIONGAP 17*     Cardiac Markers:   Recent Labs   Lab 11/02/22  1625   *     Magnesium:   Recent Labs   Lab 11/02/22  1625   MG 2.4   Troponin:   Recent Labs   Lab 11/02/22  1625   TROPONINI <0.030       Significant Imaging: I have reviewed all pertinent imaging results/findings within the past 24 hours.  CT Head Without Contrast    Result Date: 11/2/2022  CT HEAD WITHOUT CONTRAST CMS MANDATED QUALITY DATA - CT RADIATION  436 All CT scans at this facility utilize dose modulation, iterative reconstruction, and/or weight based dosing when appropriate to reduce radiation dose to as low as reasonably achievable Clinical data: Syncope. FINDINGS: Noninfusion images were obtained from the skull base to the vertex. There is no intracranial mass, hemorrhage, or midline shift. Ventricles and sulci are within normal limits. There are no pathologic extra-axial fluid collections. There is no evidence of cortical ischemic change. Changes of mild chronic microvascular white matter disease are noted. Cerebellum and brainstem are normal. The calvarium is intact. There is partial  opacification of the right sphenoid sinus. IMPRESSION: 1. No acute intracranial abnormalities. 2. Minimal periventricular low-attenuation suggesting mild chronic small vessel ischemic change. 3. Chronic right sphenoid sinusitis. Electronically signed by:  Joseph Stephens MD  11/2/2022 6:45 PM CDT Workstation: [id number]    X-Ray Chest AP Portable    Result Date: 11/2/2022  Chest single view CLINICAL DATA: Syncopal episode FINDINGS: Heart size is normal. The mediastinum is unremarkable. The left lung is clear. There is mild atelectasis at the right lung base. No effusion is identified. IMPRESSION: 1. Mild right basilar atelectasis. 2. No other significant findings. Electronically signed by:  Joseph Stephens MD  11/2/2022 5:34 PM CDT Workstation: [id number]    X-Ray Chest AP Portable    Result Date: 10/18/2022  XR CHEST 1 VIEW CLINICAL HISTORY: 57 years Female wound check COMPARISON: August 24, 2022 FINDINGS: Cardiac silhouette size is within normal limits for portable technique. Lungs are clear. No pleural effusion. No pneumothorax. No osseous abnormality. IMPRESSION: No acute pulmonary pathology. Electronically signed by:  Jonathan Prabhakar MD  10/18/2022 6:55 AM CDT Workstation: [id number]    US Carotid Bilateral    Result Date: 10/6/2022  EXAMINATION: US CAROTID BILATERAL CLINICAL HISTORY: Other specified symptoms and signs involving the circulatory and respiratory systems TECHNIQUE: Grayscale and color Doppler ultrasound examination of the carotid and vertebral artery systems bilaterally.  Stenosis estimates are per the NASCET measurement criteria. COMPARISON: None. FINDINGS: Right: Internal Carotid Artery (ICA): Peak systolic velocity 99 cm/sec End diastolic velocity 17 cm/sec ICA/CCA peak systolic ratio: 0.9 ICA/CCA end diastolic ratio: 0.8 Plaque formation: Heterogeneous Vertebral artery: Antegrade flow and normal waveform. Left: Internal Carotid Artery (ICA): Peak systolic velocity most heard 18 cm/sec End  diastolic velocity 25 cm/sec ICA/CCA peak systolic ratio: 1.1 ICA/CCA end diastolic ratio: 1.3 Plaque formation: Heterogeneous Vertebral artery: Antegrade flow and normal waveform.     Small amount of heterogeneous atherosclerotic plaque within the carotid arteries bilaterally with no hemodynamically significant stenoses identified.  Doppler findings are consistent with less than 50% diameter narrowing within the proximal internal carotid arteries bilaterally. Normal antegrade flow maintained within both vertebral arteries. Electronically signed by: Perez Salmon MD Date:    10/06/2022 Time:    11:42    US Lower Extremity Arteries Bilateral    Result Date: 10/18/2022   ADDENDUM #1 Addendum: Comparison is made to previous examination dated 5/6/2022. Similar findings on the previous examination, with no significant change in the finding of abnormal monophasic waveforms throughout bilateral lower extremities. Electronically signed by:  Trish Cooper MD  10/18/2022 1:16 AM CDT Workstation: [id number]  ORIGINAL REPORT EXAM DESCRIPTION: US LOWER EXTREMITY ARTERIES BILATERAL CLINICAL HISTORY: pad. TECHNIQUE: Real time grey scale and Doppler ultrasound were utilized to evaluate the major arterial structures of the bilateral lower extremities. COMPARISON: None. FINDINGS: Diffuse calcified plaque bilaterally. Monophasic waveforms are seen throughout the bilateral lower extremities, suggesting more proximal aortoiliac disease. There is elevated peak systolic velocity within the left mid superficial femoral artery compatible with borderline moderate stenosis (50-74%, though likely near 50%). No other focal elevation of the peak systolic velocity to suggest any focal hemodynamically significant stenosis. Due to bandages on both feet, the bilateral dorsalis pedis artery was not evaluated. Right leg (velocities in cm/s): CFA: 129 PFA: 109 SFA-proximal: 123 SFA-mid: 105 SFA-distal: 117 Popliteal: 69 PTA: 47, 18, mid to distal  ESAU: 33 Peroneal: 18, 17, mid to distal Left leg (velocities in cm/s): CFA: 174 PFA: 129 SFA-proximal: 140 SFA-mid: 201 SFA-distal: 123 Popliteal: 129 PTA: 43, 37, mid to distal ESAU: 101 Peroneal: 84 IMPRESSION: 1.  Abnormal monophasic waveforms are seen throughout the bilateral lower extremities, suggesting more proximal aortoiliac disease. 2.  Elevated peak systolic velocity within the left mid superficial femoral artery compatible with borderline moderate stenosis (50-74%, though likely near 50%. Electronically signed by:  Trish Cooper MD  10/17/2022 11:23 PM CDT Workstation: [id number]    Cardiac catheterization    Result Date: 10/18/2022  Procedure performed in the Invasive Lab  - See Procedure Log link below for nursing documentation  - See OpNote on Surgeries Tab for physician findings  - See Imaging Tab for radiologist dictation

## 2022-11-03 NOTE — PLAN OF CARE
[facility]  Initial Discharge Assessment       Primary Care Provider: Stefano Lopez MD    Admission Diagnosis: Syncope [R55]    Admission Date: 11/2/2022  Expected Discharge Date:     Discharge Barriers Identified: (P) None    Payor: [facility] MANAGED MCARE / Plan: [facility] DUAL COMPLETE HMO SNP / Product Type: Medicare Advantage /     Extended Emergency Contact Information  Primary Emergency Contact: Donovan García  Address: [address] [place] [address] [place]  Mobile Phone: [phone]  Relation: Spouse  Preferred language: English   needed? No  Secondary Emergency Contact: Sonia Cota  Address: [address] [place] [address] [place]  Mobile Phone: [phone]  Relation: Mother  Preferred language: English   needed? No    Discharge Plan A: (P) Home Health  Discharge Plan B: (P) Skilled Nursing Facility      [address] [facility]  [address] [facility] [address]  Phone: [phone] Fax: [phone]    SW met with patient and patient's Donovan García at bedside to complete discharge planning assessment.  Patient alert and oriented xs 4.  Patient verified all demographic information on facesheet is correct.  Patient verified PCP is Dr. Lopez.  Patient verified primary health insurance is [facility] and secondary [place] Medicaid   Patient active with [facility] and have listed DME.  Patient signed patient choice disclosure to resume home health with [facility].  Patient has personal wound vac in room to take home upon discharge.  Patient with NO POA or Living Will.  Patient not on dialysis or medication coumadin.  Patient states she is on Plavix.  Patient with no 30 day admission.  Patient with no financial issues at this time.  Patient family will provide transportation upon discharge from facility.  Patient  independent with ADLs, live with spouse, spouse drives.      Initial Assessment (most recent)       Adult Discharge Assessment - 11/03/22 1520          Discharge Assessment    Assessment Type Discharge Planning Assessment (P)      Confirmed/corrected address, phone number and insurance Yes (P)      Confirmed Demographics Correct on Facesheet (P)      Source of Information family;patient (P)      Does patient/caregiver understand observation status Yes (P)      Communicated ERI with patient/caregiver Yes (P)      Lives With spouse (P)      Facility Arrived From: home (P)      Do you expect to return to your current living situation? Yes (P)      Do you have help at home or someone to help you manage your care at home? Yes (P)      Who are your caregiver(s) and their phone number(s)? spouse (P)      Prior to hospitilization cognitive status: Alert/Oriented (P)      Current cognitive status: Alert/Oriented (P)      Walking or Climbing Stairs Difficulty ambulation difficulty, requires equipment;stair climbing difficulty, requires equipment (P)      Dressing/Bathing Difficulty bathing difficulty, requires equipment (P)      Equipment Currently Used at Home rollator;negative pressure wound therapy device;wound care supplies (P)      Readmission within 30 days? No (P)      Patient currently being followed by outpatient case management? No (P)      Do you currently have service(s) that help you manage your care at home? Yes (P)      Name and Contact number of agency [facility] (P)      Is the pt/caregiver preference to resume services with current agency Yes (P)      Do you take prescription medications? Yes (P)      Do you have prescription coverage? Yes (P)      Do you have any problems affording any of your prescribed medications? No (P)      Is the patient taking medications as prescribed? yes (P)      Who is going to help you get home at discharge? spouse (P)      How do you get to doctors appointments? family or friend will  provide (P)      Are you on dialysis? Yes (P)      Dialysis Name and Scheduled days [facility] Wed Fri secondary shift [address] [facility] (P)      Do you take coumadin? No (P)    Plavix    Discharge Plan A Home Health (P)      Discharge Plan B Skilled Nursing Facility (P)      DME Needed Upon Discharge  none (P)      Discharge Plan discussed with: Patient;Spouse/sig other (P)      Discharge Barriers Identified None (P)         Physical Activity    On average, how many days per week do you engage in moderate to strenuous exercise (like a brisk walk)? 2 days (P)      On average, how many minutes do you engage in exercise at this level? 60 min (P)         Financial Resource Strain    How hard is it for you to pay for the very basics like food, housing, medical care, and heating? Not hard at all (P)         Housing Stability    In the last 12 months, was there a time when you were not able to pay the mortgage or rent on time? No (P)      In the last 12 months, was there a time when you did not have a steady place to sleep or slept in a shelter (including now)? No (P)         Transportation Needs    In the past 12 months, has lack of transportation kept you from medical appointments or from getting medications? No (P)      In the past 12 months, has lack of transportation kept you from meetings, work, or from getting things needed for daily living? No (P)         Food Insecurity    Within the past 12 months, you worried that your food would run out before you got the money to buy more. Never true (P)      Within the past 12 months, the food you bought just didn't last and you didn't have money to get more. Never true (P)         Stress    Do you feel stress - tense, restless, nervous, or anxious, or unable to sleep at night because your mind is troubled all the time - these days? Not at all (P)         Social Connections    In a typical week, how many times do you talk on the phone with family, friends, or  neighbors? More than three times a week (P)      How often do you get together with friends or relatives? More than three times a week (P)      How often do you attend Advent or Sabianist services? Never (P)      Do you belong to any clubs or organizations such as [employer] groups, unions, fraternal or athletic groups, or school groups? No (P)      How often do you attend meetings of the clubs or organizations you belong to? Never (P)      Are you , , , , never , or living with a partner?  (P)         Alcohol Use    Q1: How often do you have a drink containing alcohol? Never (P)      Q2: How many drinks containing alcohol do you have on a typical day when you are drinking? Patient does not drink (P)      Q3: How often do you have six or more drinks on one occasion? Never (P)

## 2022-11-03 NOTE — NURSING
"1.5 hr into tx notified Dr. Carlson of pt c/o cramping in legs and nausea despite NS bolus of 350mL  Orders received to rinse pt back and terminate tx  VSS after rinse back, pt states she "feels better"  Pt positive 270mL  "

## 2022-11-03 NOTE — H&P
[facility] - Emergency Dept  Hospital Medicine  History & Physical    Patient Name: Leanna García  MRN: [id number]  Patient Class: OP- Observation  Admission Date: 11/2/2022  Attending Physician: Marianela Palmer MD   Primary Care Provider: Stefano Lopez MD         Patient information was obtained from patient and ER records.     Subjective:     Principal Problem:Syncope    Chief Complaint:   Chief Complaint   Patient presents with    syncopal episode during dialysis        HPI: Leanna García is a 57 y.o. female with a history as  has a past medical history of A-fib, Anemia due to end stage renal disease (6/30/2022), Arthritis, Bronchitis, Cardiovascular event risk, ASCVD 10-year risk 6.7% (10/15/2016), Diabetes mellitus type II, Diabetic foot infection, Diabetic ulcer of left midfoot (05/05/2022), Disorder of kidney and ureter, Encounter for blood transfusion, ESRD (end stage renal disease) (05/18/2018), MANJINDER (generalized anxiety disorder) (10/25/2016), History of colon polyps (11/02/2016), Hyperlipidemia, Hypertension, and Stroke. who presented to the ED with a syncopal episode during dialysis    Patient presented to the emergency room via EMS from dialysis for evaluation of syncopal episode during HD. Patient reports not having a set time for dialysis on Monday Wednesday and Friday.  States she was in show time she would be going to dialysis so she did not take her blood pressure medication.  Patient states when she got to the center her systolic blood pressure was greater than 210, she was given clonidine and states blood pressure to go while before going down body ventrally got to 180.  Dialysis was started, only half the cycle was completed, states she was doing fine until about hours a half into dialysis was 7 she started to feel as if her pressure was dropping.  She reports dialysis nurse telling her she looks funny and right after she passed out.  She states she was only out but for a  few seconds maybe a minute.  Can recall dialysis nurse telling her her systolic blood pressure was 270s, EMS was called.  Patient further states she did vomit.  Denies loss of bowel or bladder.  Denies fever chills diaphoresis chest pain palpitation abdominal pain or any other associated symptoms.  With    Lab and imaging obtained and reviewed.  CBC completed in the centrally unremarkable.  Chemistry profile shows potassium 5.5 chloride 90 HGB 17 BUN 62 creatinine 6.1 GFR 7.5 glucose 225  total protein 9.4.  .  Initial troponin within normal range.  EKG nonischemic. CXR shows mild right basilar atelectasis.    CT head  IMPRESSION:  1. No acute intracranial abnormalities.  2. Minimal periventricular low-attenuation suggesting mild chronic small vessel ischemic change.  3. Chronic right sphenoid sinusitis.    Carotid US (10/06/2022)  Impression:   Small amount of heterogeneous atherosclerotic plaque within the carotid arteries bilaterally with no hemodynamically significant stenoses identified.  Doppler findings are consistent with less than 50% diameter narrowing within the proximal internal carotid arteries bilaterally.  Normal antegrade flow maintained within both vertebral arteries.    TTE (05/2022)  Summary   The left ventricle is normal in size with normal systolic function.   The estimated ejection fraction is 65%.   Normal left ventricular diastolic function.   Normal right ventricular size with normal right ventricular systolic function.   Mild mitral regurgitation.    Per ED provider, patient presented via EMS for evaluation of syncopal episode while at dialysis.  Reported to only have completed approximately 1 hour of cycle before syncopized.  Patient lost consciousness approximately a minute.  EMS was called and patient was brought here to [facility] for further evaluation.  Initial workup unremarkable with negative CT and negative cardiac enzymes.  Noted to have elevated blood pressure 230/197 IV  hydralazine given. Last full dialysis session was completed on Monday.  Also noted to have elevated potassium, will treat an ED. nephrology consulted for inpatient dialysis.                  Past Medical History:   Diagnosis Date    A-fib     resolved per patient    Anemia due to end stage renal disease 6/30/2022    Arthritis     Bronchitis     Cardiovascular event risk, ASCVD 10-year risk 6.7% 10/15/2016    Diabetes mellitus type II     Diabetic foot infection     Diabetic ulcer of left midfoot 05/05/2022    Disorder of kidney and ureter     Encounter for blood transfusion     ESRD (end stage renal disease) 05/18/2018    MANJINDER (generalized anxiety disorder) 10/25/2016    History of colon polyps 11/02/2016    Hyperlipidemia     Hypertension     Stroke        Past Surgical History:   Procedure Laterality Date    ANGIOGRAPHY OF LOWER EXTREMITY Left 10/18/2022    Procedure: Angiogram Extremity Unilateral;  Surgeon: Ali Khoobehi, MD;  Location: [facility] CATH/EP LAB;  Service: Vascular;  Laterality: Left;    AV FISTULA PLACEMENT Left 8/29/2022    Procedure: CREATION, AV FISTULA;  Surgeon: Ali Khoobehi, MD;  Location: [facility] OR;  Service: Vascular;  Laterality: Left;    BONE BIOPSY Left 8/24/2022    Procedure: Biopsy-Bone;  Surgeon: Porfirio Ponce DPM;  Location: [facility] OR;  Service: Podiatry;  Laterality: Left;    COLONOSCOPY N/A 10/5/2016    Procedure: COLONOSCOPY;  Surgeon: Pillo Chanel MD;  Location: [facility] ENDO;  Service: Endoscopy;  Laterality: N/A;    COLONOSCOPY N/A 4/26/2022    Procedure: COLONOSCOPY;  Surgeon: Saravanan Rachel MD;  Location: [facility] ENDO;  Service: Endoscopy;  Laterality: N/A;    FISTULOGRAM Left 8/24/2022    Procedure: Fistulogram;  Surgeon: Ali Khoobehi, MD;  Location: [facility] CATH/EP LAB;  Service: Vascular;  Laterality: Left;    HERNIA REPAIR Bilateral 11/22/2016    inguinal    HYSTERECTOMY      INCISION AND DRAINAGE FOOT Left 5/13/2022    Procedure: INCISION AND DRAINAGE,  FOOT;  Surgeon: Ricardo Bruno DPM;  Location: [facility] OR;  Service: Podiatry;  Laterality: Left;    OOPHORECTOMY      THROMBECTOMY, AV FISTULA, UPPER EXTREMITY, PERCUTANEOUS  8/24/2022    Procedure: THROMBECTOMY, AV FISTULA, UPPER EXTREMITY, PERCUTANEOUS;  Surgeon: Ali Khoobehi, MD;  Location: [facility] CATH/EP LAB;  Service: Vascular;;    TOE AMPUTATION Left 8/26/2022    Procedure: AMPUTATION, TOE;  Surgeon: Porfirio Ponce DPM;  Location: [facility] OR;  Service: Podiatry;  Laterality: Left;       Review of patient's allergies indicates:   Allergen Reactions    Dilaudid [hydromorphone] Nausea And Vomiting    Chlorhexidine Itching    Lortab [hydrocodone-acetaminophen] Itching       No current facility-administered medications on file prior to encounter.     Current Outpatient Medications on File Prior to Encounter   Medication Sig    atorvastatin (LIPITOR) 80 MG tablet Take 1 tablet (80 mg total) by mouth once daily.    blood sugar diagnostic Strp To check BG 4 times daily, to use with insurance preferred meter (Patient taking differently: 1 strip by Misc.(Non-Drug; Combo Route) route 4 (four) times daily. To check BG 4 times daily, to use with insurance preferred meter)    cetirizine (ZYRTEC) 10 MG tablet Take 1 tablet (10 mg total) by mouth every other day.    clopidogreL (PLAVIX) 75 mg tablet Take 1 tablet (75 mg total) by mouth once daily.    fluticasone propionate (FLONASE) 50 mcg/actuation nasal spray 2 sprays (100 mcg total) by Each Nostril route once daily. (Patient taking differently: 2 sprays by Each Nostril route 2 (two) times a day.)    gabapentin (NEURONTIN) 100 MG capsule Take 1 capsule (100 mg total) by mouth 2 (two) times daily.    hydrALAZINE (APRESOLINE) 50 MG tablet Take 50 mg by mouth every 12 (twelve) hours.    insulin aspart U-100 (NOVOLOG FLEXPEN U-100 INSULIN) 100 unit/mL (3 mL) InPn pen Inject 5 Units into the skin 3 (three) times daily with meals. (Patient taking differently: Inject 3  "Units into the skin 3 (three) times daily with meals. Sliding scale)    insulin detemir U-100 (LEVEMIR) 100 unit/mL injection Inject 15 Units into the skin every evening.    minoxidiL (LONITEN) 10 MG Tab Take 10 mg by mouth 2 (two) times daily.    pen needle, diabetic (BD ULTRA-FINE ROBERT PEN NEEDLE) 32 gauge x 5/32" Ndle To use 4 times per day with insulin injections. (Patient taking differently: 1 pen by Misc.(Non-Drug; Combo Route) route 4 (four) times daily. To use 4 times per day with insulin injections.)    acetaminophen (TYLENOL) 325 MG tablet Take 325 mg by mouth every 6 (six) hours.    doxycycline (MONODOX) 100 MG capsule Take 1 capsule (100 mg total) by mouth 2 (two) times daily. for 14 days    epoetin chris-epbx (RETACRIT) 4,000 unit/mL injection Inject 1.11 mLs (4,440 Units total) into the skin every Mon, Wed, Fri.    lactulose (CHRONULAC) 10 gram/15 mL solution Take 20 g by mouth 2 (two) times a day.    ondansetron (ZOFRAN-ODT) 8 MG TbDL Take 1 tablet (8 mg total) by mouth every 8 (eight) hours as needed (nausea).    oxyCODONE (ROXICODONE) 5 MG immediate release tablet Take 1 tablet (5 mg total) by mouth every 6 (six) hours as needed for Pain.    polyethylene glycol (GLYCOLAX) 17 gram PwPk Take 17 g by mouth 2 (two) times daily as needed.    senna-docusate 8.6-50 mg (PERICOLACE) 8.6-50 mg per tablet Take 1 tablet by mouth 2 (two) times daily.    [DISCONTINUED] amLODIPine (NORVASC) 10 MG tablet Take 1 tablet (10 mg total) by mouth once daily.    [DISCONTINUED] aspirin (ECOTRIN) 81 MG EC tablet Take 81 mg by mouth once daily.    [DISCONTINUED] azelastine (ASTELIN) 137 mcg (0.1 %) nasal spray 1 spray (137 mcg total) by Nasal route 2 (two) times a day.    [DISCONTINUED] blood-glucose meter (ACCU-CHEK KAYLIE PLUS METER) Misc To use to check blood sugars 4 times a day    [DISCONTINUED] calcium acetate,phosphat bind, (PHOSLO) 667 mg capsule Take 1 capsule (667 mg total) by mouth 3 (three) times " daily with meals.    [DISCONTINUED] clorazepate (TRANXENE) 3.75 MG Tab Take 7.5 mg by mouth nightly as needed.     [DISCONTINUED] dextrose (GLUCOSE GEL) 40 % gel Take 37.5 mLs (15,000 mg total) by mouth once as needed (hypoglycemia).    [DISCONTINUED] ezetimibe (ZETIA) 10 mg tablet Take 1 tablet (10 mg total) by mouth once daily.    [DISCONTINUED] fenofibrate 160 MG Tab Take 1 tablet (160 mg total) by mouth once daily.    [DISCONTINUED] lancing device with lancets (ACCU-CHEK SOFT DEV LANCETS) Kit To check blood sugars 4 times per day    [DISCONTINUED] pantoprazole (PROTONIX) 40 MG tablet Take 1 tablet (40 mg total) by mouth once daily.     Family History       Problem Relation (Age of Onset)    Cancer Paternal Grandmother    Diabetes Father, Sister, Sister, Maternal Aunt, Maternal Grandmother    Heart disease Maternal Grandmother    Hyperlipidemia Mother    Hypertension Mother, Father          Tobacco Use    Smoking status: Never    Smokeless tobacco: Never   Substance and Sexual Activity    Alcohol use: No    Drug use: No    Sexual activity: Yes     Partners: Male     Review of Systems   Constitutional:  Negative for chills, diaphoresis and fever.   HENT:  Negative for congestion, postnasal drip, sinus pressure and sore throat.    Eyes:  Negative for visual disturbance.   Respiratory:  Negative for cough, chest tightness, shortness of breath and wheezing.    Cardiovascular:  Negative for chest pain, palpitations and leg swelling.   Gastrointestinal:  Positive for vomiting. Negative for abdominal distention, abdominal pain, blood in stool, constipation, diarrhea and nausea.   Endocrine: Negative.    Genitourinary:  Negative for dysuria.   Musculoskeletal: Negative.    Skin: Negative.    Allergic/Immunologic: Negative.    Neurological:  Positive for syncope. Negative for dizziness, weakness, numbness and headaches.   Hematological: Negative.    Psychiatric/Behavioral: Negative.     Objective:     Vital  Signs (Most Recent):  Temp: 97.7 °F (36.5 °C) (11/02/22 1542)  Pulse: 61 (11/02/22 2200)  Resp: 15 (11/02/22 2200)  BP: (!) 167/74 (11/02/22 2200)  SpO2: 96 % (11/02/22 2200)   Vital Signs (24h Range):  Temp:  [97.7 °F (36.5 °C)] 97.7 °F (36.5 °C)  Pulse:  [61-68] 61  Resp:  [15-37] 15  SpO2:  [94 %-98 %] 96 %  BP: (167-230)/(71-97) 167/74     Weight: 81.3 kg (179 lb 3.7 oz)  Body mass index is 27.25 kg/m².    Physical Exam  Constitutional:       General: She is not in acute distress.     Appearance: Normal appearance. She is well-developed. She is not toxic-appearing or diaphoretic.   HENT:      Head: Normocephalic and atraumatic.   Eyes:      General: Lids are normal.      Conjunctiva/sclera: Conjunctivae normal.      Pupils: Pupils are equal, round, and reactive to light.   Neck:      Thyroid: No thyroid mass or thyromegaly.      Vascular: Normal carotid pulses. No JVD.      Trachea: Trachea normal. No tracheal deviation.   Cardiovascular:      Rate and Rhythm: Normal rate and regular rhythm.      Pulses: Normal pulses.      Heart sounds: Normal heart sounds, S1 normal and S2 normal.   Pulmonary:      Effort: Pulmonary effort is normal.      Breath sounds: Normal breath sounds. No stridor.   Abdominal:      General: Bowel sounds are normal.      Palpations: Abdomen is soft.      Tenderness: There is no abdominal tenderness.   Musculoskeletal:         General: Normal range of motion.      Cervical back: Full passive range of motion without pain, normal range of motion and neck supple.   Feet:      Comments: amputation of 1st toe and partial 1st metatarsal head amputation left foot  Skin:     General: Skin is warm and dry.      Findings: Wound (left foot wound with wound vaac in place, dressing intact) present.      Nails: There is no clubbing.      Comments: Left arm AVF   Neurological:      Mental Status: She is alert and oriented to person, place, and time.      GCS: GCS eye subscore is 4. GCS verbal subscore is  5. GCS motor subscore is 6.      Cranial Nerves: No cranial nerve deficit.      Sensory: No sensory deficit.   Psychiatric:         Speech: Speech normal.         Behavior: Behavior normal. Behavior is cooperative.         Thought Content: Thought content normal.         Judgment: Judgment normal.         CRANIAL NERVES     CN III, IV, VI   Pupils are equal, round, and reactive to light.     Significant Labs: All pertinent labs within the past 24 hours have been reviewed.  A1C:   Recent Labs   Lab 05/29/22  1324 06/22/22  0618 10/17/22  1801   HGBA1C 8.3* 7.5* 7.2*     Bilirubin:   Recent Labs   Lab 10/17/22  1801 11/02/22  1625   BILITOT 0.7 0.5     BMP:   Recent Labs   Lab 11/02/22  1625   *      K 5.5*   CL 90*   CO2 29   BUN 62*   CREATININE 6.1*   CALCIUM 10.3   MG 2.4     CBC:   Recent Labs   Lab 11/02/22  1625   WBC 9.58   HGB 13.4   HCT 41.6        CMP:   Recent Labs   Lab 11/02/22  1625      K 5.5*   CL 90*   CO2 29   *   BUN 62*   CREATININE 6.1*   CALCIUM 10.3   PROT 9.4*   ALBUMIN 4.7   BILITOT 0.5   ALKPHOS 105   AST 33   ALT 39   ANIONGAP 17*     Cardiac Markers:   Recent Labs   Lab 11/02/22  1625   *     Magnesium:   Recent Labs   Lab 11/02/22  1625   MG 2.4   Troponin:   Recent Labs   Lab 11/02/22  1625   TROPONINI <0.030       Significant Imaging: I have reviewed all pertinent imaging results/findings within the past 24 hours.  CT Head Without Contrast    Result Date: 11/2/2022  CT HEAD WITHOUT CONTRAST CMS MANDATED QUALITY DATA - CT RADIATION  436 All CT scans at this facility utilize dose modulation, iterative reconstruction, and/or weight based dosing when appropriate to reduce radiation dose to as low as reasonably achievable Clinical data: Syncope. FINDINGS: Noninfusion images were obtained from the skull base to the vertex. There is no intracranial mass, hemorrhage, or midline shift. Ventricles and sulci are within normal limits. There are no pathologic  extra-axial fluid collections. There is no evidence of cortical ischemic change. Changes of mild chronic microvascular white matter disease are noted. Cerebellum and brainstem are normal. The calvarium is intact. There is partial opacification of the right sphenoid sinus. IMPRESSION: 1. No acute intracranial abnormalities. 2. Minimal periventricular low-attenuation suggesting mild chronic small vessel ischemic change. 3. Chronic right sphenoid sinusitis. Electronically signed by:  Joseph Stephens MD  11/2/2022 6:45 PM CDT Workstation: [id number]    X-Ray Chest AP Portable    Result Date: 11/2/2022  Chest single view CLINICAL DATA: Syncopal episode FINDINGS: Heart size is normal. The mediastinum is unremarkable. The left lung is clear. There is mild atelectasis at the right lung base. No effusion is identified. IMPRESSION: 1. Mild right basilar atelectasis. 2. No other significant findings. Electronically signed by:  Joseph Stephens MD  11/2/2022 5:34 PM CDT Workstation: [id number]    X-Ray Chest AP Portable    Result Date: 10/18/2022  XR CHEST 1 VIEW CLINICAL HISTORY: 57 years Female wound check COMPARISON: August 24, 2022 FINDINGS: Cardiac silhouette size is within normal limits for portable technique. Lungs are clear. No pleural effusion. No pneumothorax. No osseous abnormality. IMPRESSION: No acute pulmonary pathology. Electronically signed by:  Jonathan Prabhakar MD  10/18/2022 6:55 AM CDT Workstation: [id number]    US Carotid Bilateral    Result Date: 10/6/2022  EXAMINATION: US CAROTID BILATERAL CLINICAL HISTORY: Other specified symptoms and signs involving the circulatory and respiratory systems TECHNIQUE: Grayscale and color Doppler ultrasound examination of the carotid and vertebral artery systems bilaterally.  Stenosis estimates are per the NASCET measurement criteria. COMPARISON: None. FINDINGS: Right: Internal Carotid Artery (ICA): Peak systolic velocity 99 cm/sec End diastolic velocity 17 cm/sec  ICA/CCA peak systolic ratio: 0.9 ICA/CCA end diastolic ratio: 0.8 Plaque formation: Heterogeneous Vertebral artery: Antegrade flow and normal waveform. Left: Internal Carotid Artery (ICA): Peak systolic velocity most heard 18 cm/sec End diastolic velocity 25 cm/sec ICA/CCA peak systolic ratio: 1.1 ICA/CCA end diastolic ratio: 1.3 Plaque formation: Heterogeneous Vertebral artery: Antegrade flow and normal waveform.     Small amount of heterogeneous atherosclerotic plaque within the carotid arteries bilaterally with no hemodynamically significant stenoses identified.  Doppler findings are consistent with less than 50% diameter narrowing within the proximal internal carotid arteries bilaterally. Normal antegrade flow maintained within both vertebral arteries. Electronically signed by: Perez Salmon MD Date:    10/06/2022 Time:    11:42    US Lower Extremity Arteries Bilateral    Result Date: 10/18/2022   ADDENDUM #1 Addendum: Comparison is made to previous examination dated 5/6/2022. Similar findings on the previous examination, with no significant change in the finding of abnormal monophasic waveforms throughout bilateral lower extremities. Electronically signed by:  Trish Cooper MD  10/18/2022 1:16 AM CDT Workstation: [id number]  ORIGINAL REPORT EXAM DESCRIPTION: US LOWER EXTREMITY ARTERIES BILATERAL CLINICAL HISTORY: pad. TECHNIQUE: Real time grey scale and Doppler ultrasound were utilized to evaluate the major arterial structures of the bilateral lower extremities. COMPARISON: None. FINDINGS: Diffuse calcified plaque bilaterally. Monophasic waveforms are seen throughout the bilateral lower extremities, suggesting more proximal aortoiliac disease. There is elevated peak systolic velocity within the left mid superficial femoral artery compatible with borderline moderate stenosis (50-74%, though likely near 50%). No other focal elevation of the peak systolic velocity to suggest any focal hemodynamically  significant stenosis. Due to bandages on both feet, the bilateral dorsalis pedis artery was not evaluated. Right leg (velocities in cm/s): CFA: 129 PFA: 109 SFA-proximal: 123 SFA-mid: 105 SFA-distal: 117 Popliteal: 69 PTA: 47, 18, mid to distal ESAU: 33 Peroneal: 18, 17, mid to distal Left leg (velocities in cm/s): CFA: 174 PFA: 129 SFA-proximal: 140 SFA-mid: 201 SFA-distal: 123 Popliteal: 129 PTA: 43, 37, mid to distal ESAU: 101 Peroneal: 84 IMPRESSION: 1.  Abnormal monophasic waveforms are seen throughout the bilateral lower extremities, suggesting more proximal aortoiliac disease. 2.  Elevated peak systolic velocity within the left mid superficial femoral artery compatible with borderline moderate stenosis (50-74%, though likely near 50%. Electronically signed by:  Trish Cooper MD  10/17/2022 11:23 PM CDT Workstation: [id number]    Cardiac catheterization    Result Date: 10/18/2022  Procedure performed in the Invasive Lab  - See Procedure Log link below for nursing documentation  - See OpNote on Surgeries Tab for physician findings  - See Imaging Tab for radiologist dictation       Assessment/Plan:     Active Hospital Problems    Diagnosis  POA    *Syncope [R55]  Yes     Priority: 1 - High    Gastroparesis [K31.84]  Yes     Priority: 1 - High    Pressure injury of left heel, stage 3 [L89.623]  Yes    Slow transit constipation [K59.01]  Yes    Ulcer of right foot with fat layer exposed [L97.512]  Yes    PAD (peripheral artery disease) [I73.9]  Yes    Encounter for long-term (current) use of antibiotics [Z79.2]  Not Applicable    Type 2 diabetes mellitus with kidney complication, with long-term current use of insulin [E11.29, Z79.4]  Not Applicable    ESRD (end stage renal disease) [N18.6]  Yes    Hypertension associated with diabetes [E11.59, I15.2]  Yes     Chronic    Hyperlipidemia associated with type 2 diabetes mellitus [E11.69, E78.5]  Yes      Resolved Hospital Problems   No resolved problems  to display.     [age] y.o. female presented to emergency room some dialysis center for evaluation of syncopal episode during HD    I have reviewed the EKG and it reveals NSR non-ischemic.  Cardiac markers are WNL.  The patient is clinically-euvolemic and H/H is 13.4/41.6.  The serum glucose at arrival was 225 mg/dL.  Suspicion for seizure activity is very low.  The neurological exam is non-focal.  Etiology unclear.      CT head without acute findings. Recent carotid US showed Normal antegrade flow maintained within both vertebral arteries. Recent TTE showed EF 65% and withour diastolic dysfunction.      PLAN:  Admit to observation   Continuous cardiac monitoring  Continue to trend serial cardiac markers  Orthostatic BP  Withhold possible offending agents  Monitor serum glucose  Basal and SSI   Hypoglycemia protocol  Maintain SBP at 180 - given hypertensive episode   IV hydralazine PRN with Parameters  Daily labs  Nephrology consulted for inpt HD - patient on dialyzed 11/02/2022 for 1 hour of cycle  Wound care consult - Left foot wound with wound vac in placed followed by Dr. Bruno  Further management as per AM hospitalist      VTE Risk Mitigation (From admission, onward)         Ordered     heparin (porcine) injection 5,000 Units  Every 8 hours         11/02/22 4450                   CARMELA Humphreys  Department of Hospital Medicine   [facility] - Emergency Dept

## 2022-11-03 NOTE — PROGRESS NOTES
11/03/22 1130   Handoff Report   Received From Yoon Harris   Given To Alison   Vital Signs   Temp 98.3 °F (36.8 °C)   Temp src Oral   Pulse 62   Resp 16   SpO2 96 %   BP (!) 127/52   Assessments (Pre/Post)   Blood Liters Processed (BLP) 30   Transport Modality bed   Level of Consciousness (AVPU) alert   Dialyzer Clearance mildly streaked   Post-Hemodialysis Assessment   Rinseback Volume (mL) 250 mL   Blood Volume Processed (Liters) 500 L   Dialyzer Clearance Lightly streaked   Duration of Treatment 90 minutes   Additional Fluid Intake (mL) 850 mL   Total UF (mL) 580 mL   Net Fluid Removal 0   Patient Response to Treatment did not tolerate   Post-Treatment Weight 82.3 kg (181 lb 7 oz)   Treatment Weight Change 0.5   Arterial bleeding stop time (min) 7 min   Venous bleeding stop time (min) 7 min   Post-Hemodialysis Comments Tx complete, see note

## 2022-11-03 NOTE — HPI
Leanna García is a 57 y.o. female with a history as  has a past medical history of A-fib, Anemia due to end stage renal disease (6/30/2022), Arthritis, Bronchitis, Cardiovascular event risk, ASCVD 10-year risk 6.7% (10/15/2016), Diabetes mellitus type II, Diabetic foot infection, Diabetic ulcer of left midfoot (05/05/2022), Disorder of kidney and ureter, Encounter for blood transfusion, ESRD (end stage renal disease) (05/18/2018), MANJINDER (generalized anxiety disorder) (10/25/2016), History of colon polyps (11/02/2016), Hyperlipidemia, Hypertension, and Stroke. who presented to the ED with a syncopal episode during dialysis    Patient presented to the emergency room via EMS from dialysis for evaluation of syncopal episode during HD. Patient reports not having a set time for dialysis on Monday Wednesday and Friday.  States she was in show time she would be going to dialysis so she did not take her blood pressure medication.  Patient states when she got to the center her systolic blood pressure was greater than 210, she was given clonidine and states blood pressure to go while before going down body ventrally got to 180.  Dialysis was started, only half the cycle was completed, states she was doing fine until about hours a half into dialysis was 7 she started to feel as if her pressure was dropping.  She reports dialysis nurse telling her she looks funny and right after she passed out.  She states she was only out but for a few seconds maybe a minute.  Can recall dialysis nurse telling her her systolic blood pressure was 270s, EMS was called.  Patient further states she did vomit.  Denies loss of bowel or bladder.  Denies fever chills diaphoresis chest pain palpitation abdominal pain or any other associated symptoms.  With    Lab and imaging obtained and reviewed.  CBC completed in the centrally unremarkable.  Chemistry profile shows potassium 5.5 chloride 90 HGB 17 BUN 62 creatinine 6.1 GFR 7.5 glucose 225  total  protein 9.4.  .  Initial troponin within normal range.  EKG nonischemic. CXR shows mild right basilar atelectasis.    CT head  IMPRESSION:  1. No acute intracranial abnormalities.  2. Minimal periventricular low-attenuation suggesting mild chronic small vessel ischemic change.  3. Chronic right sphenoid sinusitis.    Carotid US (10/06/2022)  Impression:   Small amount of heterogeneous atherosclerotic plaque within the carotid arteries bilaterally with no hemodynamically significant stenoses identified.  Doppler findings are consistent with less than 50% diameter narrowing within the proximal internal carotid arteries bilaterally.  Normal antegrade flow maintained within both vertebral arteries.    TTE (05/2022)  Summary  The left ventricle is normal in size with normal systolic function.  The estimated ejection fraction is 65%.  Normal left ventricular diastolic function.  Normal right ventricular size with normal right ventricular systolic function.  Mild mitral regurgitation.    Per ED provider, patient presented via EMS for evaluation of syncopal episode while at dialysis.  Reported to only have completed approximately 1 hour of cycle before syncopized.  Patient lost consciousness approximately a minute.  EMS was called and patient was brought here to [facility] for further evaluation.  Initial workup unremarkable with negative CT and negative cardiac enzymes.  Noted to have elevated blood pressure 230/197 IV hydralazine given. Last full dialysis session was completed on Monday.  Also noted to have elevated potassium, will treat an ED. nephrology consulted for inpatient dialysis.

## 2022-11-03 NOTE — PROGRESS NOTES
[facility] Medicine  Progress Note    Patient name: Leanna García  MRN: [id number]  Admit Date: 11/2/2022   LOS: 0 days     SUBJECTIVE:     Principal problem: Syncope    Interval History:  Admitted yesterday for syncope while undergoing dialysis.  She reports loss of consciousness for approximately 1 minute.  No preceding chest pain, shortness a breath or nausea/vomiting.  She denies palpitations.  As per report she was found to be in hypertensive crisis for which he was given clonidine.  No overnight events on telemetry.    Seen today during dialysis.  Reports nausea and weakness in her both legs.    Scheduled Meds:   sodium chloride 0.9%   Intravenous Once    sodium chloride 0.9%   Intravenous Once    atorvastatin  80 mg Oral Daily    cetirizine  10 mg Oral Every other day    clopidogreL  75 mg Oral Daily    doxycycline  100 mg Oral Q12H    [START ON 11/4/2022] epoetin chris-epbx  50 Units/kg Intravenous Every Mon, Wed, Fri    gabapentin  100 mg Oral BID    hydrALAZINE  50 mg Oral Q12H    insulin detemir U-100  7 Units Subcutaneous QHS    minoxidiL  10 mg Oral BID    mupirocin   Nasal BID     Continuous Infusions:  PRN Meds:sodium chloride 0.9%, sodium chloride 0.9%, acetaminophen, dextrose 10%, dextrose 10%, glucagon (human recombinant), glucose, glucose, heparin (porcine), hydrALAZINE, hydrALAZINE, insulin aspart U-100, sodium chloride 0.9%, sodium chloride 0.9%, sodium chloride 0.9%    Review of patient's allergies indicates:   Allergen Reactions    Dilaudid [hydromorphone] Nausea And Vomiting    Chlorhexidine Itching    Lortab [hydrocodone-acetaminophen] Itching       Review of Systems: As per interval history    OBJECTIVE:     Vital Signs (Most Recent)  Temp: 98.3 °F (36.8 °C) (11/03/22 1130)  Pulse: 62 (11/03/22 1130)  Resp: 16 (11/03/22 1130)  BP: (!) 127/52 (11/03/22 1130)  SpO2: 96 % (11/03/22 1130)    Vital Signs Range (Last 24H):  Temp:  [97.6 °F (36.4 °C)-98.3 °F (36.8 °C)]    Pulse:  [54-76]   Resp:  [15-37]   BP: ()/(46-97)   SpO2:  [94 %-98 %]     I & O (Last 24H):  Intake/Output Summary (Last 24 hours) at 11/3/2022 1530  Last data filed at 11/3/2022 1130  Gross per 24 hour   Intake 1210 ml   Output 780 ml   Net 430 ml       Physical Exam:  General: Patient resting comfortably in no acute distress. Appears as stated age. Calm  Eyes: No conjunctival injection. No scleral icterus.  ENT: Hearing grossly intact. No discharge from ears. No nasal discharge.   Neck: Supple, trachea midline. No JVD  CVS: RRR. No LE edema BL  Lungs:  No tachypnea or accessory muscle use.  Clear to auscultation bilaterally  Abdomen:  Soft, nontender and nondistended.  No organomegaly  Neuro: AOx3. Moves all extremities. Follows commands. Responds appropriately   Skin:  Left foot dressing is clean, dry and intact; connected to wound VAC    Laboratory:  I have reviewed all pertinent lab results within the past 24 hours.  CBC:   Recent Labs   Lab 11/03/22  0450   WBC 10.68   RBC 3.89*   HGB 11.1*   HCT 35.6*      MCV 92   MCH 28.5   MCHC 31.2*     CMP:   Recent Labs   Lab 11/03/22  0450   *   CALCIUM 9.4   ALBUMIN 3.7   PROT 7.3      K 4.6   CO2 29   CL 92*   BUN 73*   CREATININE 7.0*   ALKPHOS 79   ALT 27   AST 16   BILITOT 0.7       Diagnostic Results:  Labs: Reviewed  ECG: Reviewed    ASSESSMENT/PLAN:       Active Hospital Problems    Diagnosis  POA    *Syncope [R55]  Yes    Pressure injury of left heel, stage 3 [L89.623]  Yes    Gastroparesis [K31.84]  Yes    Slow transit constipation [K59.01]  Yes    Ulcer of right foot with fat layer exposed [L97.512]  Yes    PAD (peripheral artery disease) [I73.9]  Yes    Encounter for long-term (current) use of antibiotics [Z79.2]  Not Applicable    Type 2 diabetes mellitus with kidney complication, with long-term current use of insulin [E11.29, Z79.4]  Not Applicable    ESRD (end stage renal disease) [N18.6]  Yes    Hypertension associated with  diabetes [E11.59, I15.2]  Yes     Chronic    Hyperlipidemia associated with type 2 diabetes mellitus [E11.69, E78.5]  Yes      Resolved Hospital Problems   No resolved problems to display.         Plan:   Syncope of unclear etiology - negative orthostatics   Possible vasovagal etiology based on presentation   Reviewed recent echocardiogram from May 2022 which revealed normal LVEF   Continue telemetry monitoring   Monitor blood pressure curve   Nephrology consulted for hemodialysis; appreciate input  Continue home medications for chronic medical conditions including antihypertensives, hydralazine and minoxidil  On clopidogrel for PAD (status post popliteal stenosis balloon angioplasty - 06/24/2022)  Basal insulin along with low-dose sliding scale for type 2 DM    VTE Risk Mitigation (From admission, onward)           Ordered     heparin (porcine) injection 5,000 Units  As needed (PRN)         11/03/22 1447                        Department Hospital Medicine  [facility]  Andrea Montoya MD  Date of service: 11/03/2022

## 2022-11-03 NOTE — CONSULTS
Left foot with [employer] wound vac(patients own) was changed by home health yesterday.  Will leave in place if patient does not go home tomorrow change vac dressing.  Above discussed with Dr. Montoya

## 2022-11-03 NOTE — CONSULTS
INPATIENT NEPHROLOGY CONSULT   [facility] NEPHROLOGY    Leanna García  11/03/2022    Reason for consultation:    esrd    Chief Complaint:   Chief Complaint   Patient presents with    syncopal episode during dialysis          History of Present Illness:      Per H and  P    Leanna García is a 57 y.o. female with a history as  has a past medical history of A-fib, Anemia due to end stage renal disease (6/30/2022), Arthritis, Bronchitis, Cardiovascular event risk, ASCVD 10-year risk 6.7% (10/15/2016), Diabetes mellitus type II, Diabetic foot infection, Diabetic ulcer of left midfoot (05/05/2022), Disorder of kidney and ureter, Encounter for blood transfusion, ESRD (end stage renal disease) (05/18/2018), MANJINDER (generalized anxiety disorder) (10/25/2016), History of colon polyps (11/02/2016), Hyperlipidemia, Hypertension, and Stroke. who presented to the ED with a syncopal episode during dialysis    11/3  No nausea, chest pain, sob, fever, urinary or bowel complaint, new neurologic symptoms, new joint pain   Patient seen on hemodialysis for uremic clearance and ultrafiltration.          Plan of Care:       Assessment:    esrd  --continue dialysis per routine  --fluid restrict  --renal dose medication per routine  --continue outpt medication  --continue binders with meals    Anemia  --erythropoiesis stimulating agent with renal replacement therapy    Hyperphosphatemia  --renal diet  --continue binders    Hypertension  --uf with hd  --fluid restrict  --low salt diet  --continue home medication           Thank you for allowing us to participate in this patient's care. We will continue to follow.    Vital Signs:  Temp Readings from Last 3 Encounters:   11/03/22 97.6 °F (36.4 °C) (Oral)   11/01/22 98.4 °F (36.9 °C)   10/25/22 98.4 °F (36.9 °C)       Pulse Readings from Last 3 Encounters:   11/03/22 64   11/01/22 76   10/25/22 77       BP Readings from Last 3 Encounters:   11/03/22 (!) 172/89   11/01/22 (!) 142/87    10/25/22 (!) 137/93       Weight:  Wt Readings from Last 3 Encounters:   11/03/22 81.8 kg (180 lb 5.4 oz)   10/19/22 84.1 kg (185 lb 6.5 oz)   09/27/22 79.4 kg (175 lb)       Past Medical & Surgical History:  Past Medical History:   Diagnosis Date    A-fib     resolved per patient    Anemia due to end stage renal disease 6/30/2022    Arthritis     Bronchitis     Cardiovascular event risk, ASCVD 10-year risk 6.7% 10/15/2016    Diabetes mellitus type II     Diabetic foot infection     Diabetic ulcer of left midfoot 05/05/2022    Disorder of kidney and ureter     Encounter for blood transfusion     ESRD (end stage renal disease) 05/18/2018    MANJINDER (generalized anxiety disorder) 10/25/2016    History of colon polyps 11/02/2016    Hyperlipidemia     Hypertension     Stroke        Past Surgical History:   Procedure Laterality Date    ANGIOGRAPHY OF LOWER EXTREMITY Left 10/18/2022    Procedure: Angiogram Extremity Unilateral;  Surgeon: Ali Khoobehi, MD;  Location: [facility] CATH/EP LAB;  Service: Vascular;  Laterality: Left;    AV FISTULA PLACEMENT Left 8/29/2022    Procedure: CREATION, AV FISTULA;  Surgeon: Ali Khoobehi, MD;  Location: [facility] OR;  Service: Vascular;  Laterality: Left;    BONE BIOPSY Left 8/24/2022    Procedure: Biopsy-Bone;  Surgeon: Porfirio Ponce DPM;  Location: [facility] OR;  Service: Podiatry;  Laterality: Left;    COLONOSCOPY N/A 10/5/2016    Procedure: COLONOSCOPY;  Surgeon: Pillo Chanel MD;  Location: [facility] ENDO;  Service: Endoscopy;  Laterality: N/A;    COLONOSCOPY N/A 4/26/2022    Procedure: COLONOSCOPY;  Surgeon: Saravanan Rachel MD;  Location: [facility] ENDO;  Service: Endoscopy;  Laterality: N/A;    FISTULOGRAM Left 8/24/2022    Procedure: Fistulogram;  Surgeon: Ali Khoobehi, MD;  Location: [facility] CATH/EP LAB;  Service: Vascular;  Laterality: Left;    HERNIA REPAIR Bilateral 11/22/2016    inguinal    HYSTERECTOMY      INCISION AND DRAINAGE FOOT Left 5/13/2022    Procedure: INCISION AND DRAINAGE, FOOT;   Surgeon: Ricardo Bruno DPM;  Location: [facility] OR;  Service: Podiatry;  Laterality: Left;    OOPHORECTOMY      THROMBECTOMY, AV FISTULA, UPPER EXTREMITY, PERCUTANEOUS  8/24/2022    Procedure: THROMBECTOMY, AV FISTULA, UPPER EXTREMITY, PERCUTANEOUS;  Surgeon: Ali Khoobehi, MD;  Location: [facility] CATH/EP LAB;  Service: Vascular;;    TOE AMPUTATION Left 8/26/2022    Procedure: AMPUTATION, TOE;  Surgeon: Porfirio Ponce DPM;  Location: [facility] OR;  Service: Podiatry;  Laterality: Left;       Past Social History:  Social History     Socioeconomic History    Marital status:    Tobacco Use    Smoking status: Never    Smokeless tobacco: Never   Substance and Sexual Activity    Alcohol use: No    Drug use: No    Sexual activity: Yes     Partners: Male   Social History Narrative    Caregiver        Medications:  No current facility-administered medications on file prior to encounter.     Current Outpatient Medications on File Prior to Encounter   Medication Sig Dispense Refill    atorvastatin (LIPITOR) 80 MG tablet Take 1 tablet (80 mg total) by mouth once daily. 90 tablet 3    blood sugar diagnostic Strp To check BG 4 times daily, to use with insurance preferred meter (Patient taking differently: 1 strip by Misc.(Non-Drug; Combo Route) route 4 (four) times daily. To check BG 4 times daily, to use with insurance preferred meter) 450 strip 3    cetirizine (ZYRTEC) 10 MG tablet Take 1 tablet (10 mg total) by mouth every other day. 45 tablet 3    clopidogreL (PLAVIX) 75 mg tablet Take 1 tablet (75 mg total) by mouth once daily. 90 tablet 3    fluticasone propionate (FLONASE) 50 mcg/actuation nasal spray 2 sprays (100 mcg total) by Each Nostril route once daily. (Patient taking differently: 2 sprays by Each Nostril route 2 (two) times a day.) 16 g 3    gabapentin (NEURONTIN) 100 MG capsule Take 1 capsule (100 mg total) by mouth 2 (two) times daily. 180 capsule 3    hydrALAZINE (APRESOLINE) 50 MG tablet Take 50 mg by  "mouth every 12 (twelve) hours.      insulin aspart U-100 (NOVOLOG FLEXPEN U-100 INSULIN) 100 unit/mL (3 mL) InPn pen Inject 5 Units into the skin 3 (three) times daily with meals. (Patient taking differently: Inject 3 Units into the skin 3 (three) times daily with meals. Sliding scale) 3 mL 6    insulin detemir U-100 (LEVEMIR) 100 unit/mL injection Inject 15 Units into the skin every evening.      minoxidiL (LONITEN) 10 MG Tab Take 10 mg by mouth 2 (two) times daily.      pen needle, diabetic (BD ULTRA-FINE ROBERT PEN NEEDLE) 32 gauge x 5/32" Ndle To use 4 times per day with insulin injections. (Patient taking differently: 1 pen by Misc.(Non-Drug; Combo Route) route 4 (four) times daily. To use 4 times per day with insulin injections.) 450 each 2    acetaminophen (TYLENOL) 325 MG tablet Take 325 mg by mouth every 6 (six) hours.      doxycycline (MONODOX) 100 MG capsule Take 1 capsule (100 mg total) by mouth 2 (two) times daily. for 14 days 28 capsule 0    epoetin chris-epbx (RETACRIT) 4,000 unit/mL injection Inject 1.11 mLs (4,440 Units total) into the skin every Mon, Wed, Fri.      lactulose (CHRONULAC) 10 gram/15 mL solution Take 20 g by mouth 2 (two) times a day.      ondansetron (ZOFRAN-ODT) 8 MG TbDL Take 1 tablet (8 mg total) by mouth every 8 (eight) hours as needed (nausea).      oxyCODONE (ROXICODONE) 5 MG immediate release tablet Take 1 tablet (5 mg total) by mouth every 6 (six) hours as needed for Pain.      polyethylene glycol (GLYCOLAX) 17 gram PwPk Take 17 g by mouth 2 (two) times daily as needed.      senna-docusate 8.6-50 mg (PERICOLACE) 8.6-50 mg per tablet Take 1 tablet by mouth 2 (two) times daily.      [DISCONTINUED] blood-glucose meter (ACCU-CHEK KAYLIE PLUS METER) Misc To use to check blood sugars 4 times a day 1 each 0    [DISCONTINUED] clorazepate (TRANXENE) 3.75 MG Tab Take 7.5 mg by mouth nightly as needed.       [DISCONTINUED] dextrose (GLUCOSE GEL) 40 % gel Take 37.5 mLs (15,000 mg total) by " "mouth once as needed (hypoglycemia). 37.5 g 4    [DISCONTINUED] ezetimibe (ZETIA) 10 mg tablet Take 1 tablet (10 mg total) by mouth once daily.      [DISCONTINUED] fenofibrate 160 MG Tab Take 1 tablet (160 mg total) by mouth once daily. 90 tablet 3    [DISCONTINUED] lancing device with lancets (ACCU-CHEK SOFT DEV LANCETS) Kit To check blood sugars 4 times per day 400 each 3    [DISCONTINUED] pantoprazole (PROTONIX) 40 MG tablet Take 1 tablet (40 mg total) by mouth once daily.       Scheduled Meds:   atorvastatin  80 mg Oral Daily    cetirizine  10 mg Oral Every other day    clopidogreL  75 mg Oral Daily    doxycycline  100 mg Oral Q12H    gabapentin  100 mg Oral BID    hydrALAZINE  50 mg Oral Q12H    insulin detemir U-100  7 Units Subcutaneous QHS    minoxidiL  10 mg Oral BID     Continuous Infusions:  PRN Meds:.acetaminophen, dextrose 10%, dextrose 10%, glucagon (human recombinant), glucose, glucose, hydrALAZINE, hydrALAZINE, insulin aspart U-100, sodium chloride 0.9%    Allergies:  Dilaudid [hydromorphone], Chlorhexidine, and Lortab [hydrocodone-acetaminophen]    Past Family History:  Reviewed; refer to Hospitalist Admission Note    Review of Systems:  Review of Systems - All 14 systems reviewed and negative, except as noted in HPI    Physical Exam:    BP (!) 172/89 (BP Location: Right arm, Patient Position: Lying)   Pulse 64   Temp 97.6 °F (36.4 °C) (Oral)   Resp 18   Ht 5' 8" (1.727 m)   Wt 81.8 kg (180 lb 5.4 oz)   SpO2 96%   Breastfeeding No   BMI 27.42 kg/m²     General Appearance:    Alert, cooperative, no distress, appears stated age   Head:    Normocephalic, without obvious abnormality, atraumatic   Eyes:    PER, conjunctiva/corneas clear, EOM's intact in both eyes        Throat:   Lips, mucosa, and tongue normal; teeth and gums normal   Back:     Symmetric, no curvature, ROM normal, no CVA tenderness   Lungs:     Clear to auscultation bilaterally, respirations unlabored   Chest wall:    No " tenderness or deformity   Heart:    Regular rate and rhythm, S1 and S2 normal, no murmur, rub   or gallop   Abdomen:     Soft, non-tender, bowel sounds active all four quadrants,     no masses, no organomegaly   Extremities:   Extremities normal, atraumatic, no cyanosis or edema   Pulses:   2+ and symmetric all extremities   MSK:   No joint or muscle swelling, tenderness or deformity   Skin:   Skin color, texture, turgor normal, no rashes or lesions   Neurologic:   CNII-XII intact, normal strength and sensation       Throughout.  No flap     Results:  Lab Results   Component Value Date     11/03/2022    K 4.6 11/03/2022    CL 92 (L) 11/03/2022    CO2 29 11/03/2022    BUN 73 (H) 11/03/2022    CREATININE 7.0 (H) 11/03/2022    CALCIUM 9.4 11/03/2022    ANIONGAP 15 11/03/2022    ESTGFRAFRICA 5 (A) 07/29/2022    EGFRNONAA 5 (A) 07/29/2022       Lab Results   Component Value Date    CALCIUM 9.4 11/03/2022    PHOS 7.6 (H) 11/03/2022       Recent Labs   Lab 11/03/22  0450   WBC 10.68   RBC 3.89*   HGB 11.1*   HCT 35.6*      MCV 92   MCH 28.5   MCHC 31.2*          I have personally reviewed pertinent radiological imaging and reports.      Patient care was time spent personally by me on the following activities:   Obtaining a history  Examination of patient.  Providing medical care at the patients bedside.  Developing a treatment plan with patient or surrogate and bedside caregivers  Ordering and reviewing laboratory studies, radiographic studies, pulse oximetry.  Ordering and performing treatments and interventions.  Evaluation of patient's response to treatment.  Discussions with consultants while on the unit and immediately available to the patient.  Re-evaluation of the patient's condition.  Documentation in the medical record.     .[email or web address]

## 2022-11-03 NOTE — PLAN OF CARE
Problem: Adult Inpatient Plan of Care  Goal: Plan of Care Review  Outcome: Ongoing, Progressing  Goal: Patient-Specific Goal (Individualized)  Outcome: Ongoing, Progressing  Goal: Absence of Hospital-Acquired Illness or Injury  Outcome: Ongoing, Progressing  Goal: Optimal Comfort and Wellbeing  Outcome: Ongoing, Progressing  Goal: Readiness for Transition of Care  Outcome: Ongoing, Progressing     Problem: Diabetes Comorbidity  Goal: Blood Glucose Level Within Targeted Range  Outcome: Ongoing, Progressing     Problem: Impaired Wound Healing  Goal: Optimal Wound Healing  Outcome: Ongoing, Progressing     Problem: Device-Related Complication Risk (Hemodialysis)  Goal: Safe, Effective Therapy Delivery  Outcome: Ongoing, Progressing     Problem: Hemodynamic Instability (Hemodialysis)  Goal: Effective Tissue Perfusion  Outcome: Ongoing, Progressing     Problem: Infection (Hemodialysis)  Goal: Absence of Infection Signs and Symptoms  Outcome: Ongoing, Progressing

## 2022-11-03 NOTE — PLAN OF CARE
11/03/22 1520   ERICKSON Message   Medicare Outpatient and Observation Notification regarding financial responsibility Explained to patient/caregiver;Signed/date by patient/caregiver   Date ERICKSON was signed 11/03/22   Time ERICKSON was signed 1520

## 2022-11-04 VITALS
HEART RATE: 76 BPM | RESPIRATION RATE: 17 BRPM | HEIGHT: 68 IN | BODY MASS INDEX: 27.33 KG/M2 | TEMPERATURE: 98 F | WEIGHT: 180.31 LBS | SYSTOLIC BLOOD PRESSURE: 105 MMHG | OXYGEN SATURATION: 93 % | DIASTOLIC BLOOD PRESSURE: 54 MMHG

## 2022-11-04 PROBLEM — R55 SYNCOPE: Status: RESOLVED | Noted: 2022-11-02 | Resolved: 2022-11-04

## 2022-11-04 LAB
ANION GAP SERPL CALC-SCNC: 14 MMOL/L (ref 8–16)
BUN SERPL-MCNC: 60 MG/DL (ref 6–20)
CALCIUM SERPL-MCNC: 9.1 MG/DL (ref 8.7–10.5)
CHLORIDE SERPL-SCNC: 100 MMOL/L (ref 95–110)
CO2 SERPL-SCNC: 21 MMOL/L (ref 23–29)
CREAT SERPL-MCNC: 6.5 MG/DL (ref 0.5–1.4)
ERYTHROCYTE [DISTWIDTH] IN BLOOD BY AUTOMATED COUNT: 14.2 % (ref 11.5–14.5)
EST. GFR  (NO RACE VARIABLE): 7 ML/MIN/1.73 M^2
GLUCOSE SERPL-MCNC: 116 MG/DL (ref 70–110)
GLUCOSE SERPL-MCNC: 159 MG/DL (ref 70–110)
GLUCOSE SERPL-MCNC: 170 MG/DL (ref 70–110)
GLUCOSE SERPL-MCNC: 216 MG/DL (ref 70–110)
GLUCOSE SERPL-MCNC: 319 MG/DL (ref 70–110)
HCT VFR BLD AUTO: 34.2 % (ref 37–48.5)
HGB BLD-MCNC: 10.6 G/DL (ref 12–16)
MAGNESIUM SERPL-MCNC: 2.3 MG/DL (ref 1.6–2.6)
MCH RBC QN AUTO: 28.6 PG (ref 27–31)
MCHC RBC AUTO-ENTMCNC: 31 G/DL (ref 32–36)
MCV RBC AUTO: 92 FL (ref 82–98)
PLATELET # BLD AUTO: 224 K/UL (ref 150–450)
PMV BLD AUTO: 10.9 FL (ref 9.2–12.9)
POTASSIUM SERPL-SCNC: 4.8 MMOL/L (ref 3.5–5.1)
RBC # BLD AUTO: 3.7 M/UL (ref 4–5.4)
SODIUM SERPL-SCNC: 135 MMOL/L (ref 136–145)
WBC # BLD AUTO: 9.39 K/UL (ref 3.9–12.7)

## 2022-11-04 PROCEDURE — 36415 COLL VENOUS BLD VENIPUNCTURE: CPT | Performed by: INTERNAL MEDICINE

## 2022-11-04 PROCEDURE — 83735 ASSAY OF MAGNESIUM: CPT | Performed by: INTERNAL MEDICINE

## 2022-11-04 PROCEDURE — 25000003 PHARM REV CODE 250: Performed by: INTERNAL MEDICINE

## 2022-11-04 PROCEDURE — 85027 COMPLETE CBC AUTOMATED: CPT | Performed by: INTERNAL MEDICINE

## 2022-11-04 PROCEDURE — 96374 THER/PROPH/DIAG INJ IV PUSH: CPT | Mod: 59

## 2022-11-04 PROCEDURE — 63600175 PHARM REV CODE 636 W HCPCS: Mod: JG | Performed by: INTERNAL MEDICINE

## 2022-11-04 PROCEDURE — 80048 BASIC METABOLIC PNL TOTAL CA: CPT | Performed by: INTERNAL MEDICINE

## 2022-11-04 PROCEDURE — G0378 HOSPITAL OBSERVATION PER HR: HCPCS

## 2022-11-04 PROCEDURE — 90935 HEMODIALYSIS ONE EVALUATION: CPT

## 2022-11-04 PROCEDURE — G0257 UNSCHED DIALYSIS ESRD PT HOS: HCPCS

## 2022-11-04 PROCEDURE — 25000003 PHARM REV CODE 250: Performed by: NURSE PRACTITIONER

## 2022-11-04 RX ORDER — GUAIFENESIN/DEXTROMETHORPHAN 100-10MG/5
5 SYRUP ORAL EVERY 4 HOURS PRN
Qty: 354 ML | Refills: 0 | Status: SHIPPED | OUTPATIENT
Start: 2022-11-04 | End: 2022-11-14

## 2022-11-04 RX ORDER — HYDRALAZINE HYDROCHLORIDE 25 MG/1
25 TABLET, FILM COATED ORAL 2 TIMES DAILY PRN
Start: 2022-11-04 | End: 2024-03-21

## 2022-11-04 RX ADMIN — GABAPENTIN 100 MG: 100 CAPSULE ORAL at 12:11

## 2022-11-04 RX ADMIN — ACETAMINOPHEN 650 MG: 325 TABLET ORAL at 08:11

## 2022-11-04 RX ADMIN — EPOETIN ALFA-EPBX 4100 UNITS: 10000 INJECTION, SOLUTION INTRAVENOUS; SUBCUTANEOUS at 10:11

## 2022-11-04 RX ADMIN — DOXYCYCLINE HYCLATE 100 MG: 100 CAPSULE ORAL at 12:11

## 2022-11-04 RX ADMIN — ATORVASTATIN CALCIUM 80 MG: 40 TABLET, FILM COATED ORAL at 12:11

## 2022-11-04 RX ADMIN — MUPIROCIN 1 G: 20 OINTMENT TOPICAL at 12:11

## 2022-11-04 RX ADMIN — CLOPIDOGREL BISULFATE 75 MG: 75 TABLET, FILM COATED ORAL at 12:11

## 2022-11-04 NOTE — PLAN OF CARE
Referrals sent to [facility]; via [facility].      11/04/22 0198   Post-Acute Status   Post-Acute Authorization Home Health   Home Health Status Referrals Sent   Patient choice form signed by patient/caregiver List with quality metrics by geographic area provided

## 2022-11-04 NOTE — NURSING
57 yr old female  awake and alert  in bed  is ready to go home   Dressing change to the right foot NPWT  Dressing removed and area cleaned  pat dry  applied benzoin  to the periwound   Black foam cut to fit and placed  drape and dome bridge to the lower medial leg  drape placed   to suction @125mmHg and holding

## 2022-11-04 NOTE — NURSING
Discharge instructions reviewed with pt.  Pt voice understanding.  Pt home health called and state will see pt on 11/5.  Pt notified of glucose of 319, pt state will recheck glucose when she get home and eat.  Peripheral IV removed, pressure applied.  Telemetry removed.  Pt assisted to private vehicle via w/c with assistance.

## 2022-11-04 NOTE — PLAN OF CARE
Patient cleared for discharge from case management standpoint. Pt accepted to [facility] via [facility]; will be seen 11/5/22.    Follow up appointments scheduled and added to AVS.    Chart and discharge orders reviewed.  Patient discharged home with no further case management needs.       11/04/22 1145   Final Note   Assessment Type Final Discharge Note   Anticipated Discharge Disposition Home-Health   What phone number can be called within the next 1-3 days to see how you are doing after discharge? [phone]   Hospital Resources/Appts/Education Provided Appointments scheduled and added to AVS;Provided patient/caregiver with written discharge plan information;Post-Acute resouces added to AVS;Provided education on problems/symptoms using teachback   Post-Acute Status   Post-Acute Authorization Home Health   Home Health Status Set-up Complete/Auth obtained   Discharge Delays None known at this time

## 2022-11-04 NOTE — PROGRESS NOTES
11/04/22 1130   Post-Hemodialysis Assessment   Rinseback Volume (mL) 250 mL   Blood Volume Processed (Liters) 68 L   Dialyzer Clearance Lightly streaked   Duration of Treatment 180 minutes   Additional Fluid Intake (mL) 500 mL   Total UF (mL) 1500 mL   Net Fluid Removal 1000   Patient Response to Treatment tolerated well   Post-Treatment Weight 80.8 kg (178 lb 2.1 oz)   Treatment Weight Change -1   Arterial bleeding stop time (min) 6 min   Venous bleeding stop time (min) 6 min   Post-Hemodialysis Comments low BP when further UF attempted, Dr Carlson notified

## 2022-11-04 NOTE — HOSPITAL COURSE
57-year-old female with multiple medical comorbidities as listed below sent to the ED suffering a syncopal episode during dialysis.  She was briefly hypertensive at the dialysis center and received clonidine but later blood pressure improved.  She reports symptoms lasting for approximately a minute before regaining consciousness. Reviewed recent echocardiogram from May 2022 which revealed normal LVEF.  Also reviewed carotid ultrasound from last month which revealed no hemodynamically significant stenosis.  No events here on telemetry and remained in normal sinus rhythm.  Suspect syncope likely secondary to vasovagal etiology.  She has been deemed medically stable to be discharged home.  Advised follow-up with Cardiology upon discharge.  Home health has been resumed for chronic left foot diabetic ulcer.    I have seen the patient on the day of discharge and reviewed the discharge instructions as outlined below. Patient verbalized understanding and is aware to contact primary care physician or return to ED if new or worsening symptoms.

## 2022-11-04 NOTE — DISCHARGE SUMMARY
[facility]  Discharge Summary      Patient Name: Leanna García  MRN: [id number]  PRIYANKA: [id number]  Patient Class: OP- Observation  Admission Date: 11/2/2022  Hospital Length of Stay: 0 days  Discharge Date and Time:  11/04/2022 3:17 PM  Attending Physician: Andrea Montoya MD   Discharging Provider: Andrea Montoya MD  Primary Care Provider: Stefano Lopez MD    Primary Care Team: Networked reference to record PCT       Hospital Course:   57-year-old female with multiple medical comorbidities as listed below sent to the ED suffering a syncopal episode during dialysis.  She was briefly hypertensive at the dialysis center and received clonidine but later blood pressure improved.  She reports symptoms lasting for approximately a minute before regaining consciousness.  No events here on telemetry and remained in normal sinus rhythm.  Suspect syncope likely secondary to vasovagal etiology.  She has been deemed medically stable to be discharged home.  Advised follow-up with Cardiology upon discharge.  Home health has been resumed for chronic left foot diabetic ulcer.    I have seen the patient on the day of discharge and reviewed the discharge instructions as outlined below. Patient verbalized understanding and is aware to contact primary care physician or return to ED if new or worsening symptoms.         Goals of Care Treatment Preferences:  Code Status: Full Code      Consults:   Consults (From admission, onward)        Status Ordering Provider     Inpatient consult to Nephrology  Once        Provider:  Bryce Craig MD    Acknowledged MIKE RODRIGUEZ     Inpatient consult to Hospitalist  Once        Provider:  CARMELA Lopez    Acknowledged MIKE RODRIGUEZ          No new Assessment & Plan notes have been filed under this hospital service since the last note was generated.  Service: Hospital Medicine    Final Active Diagnoses:    Diagnosis Date Noted POA     Pressure injury of left heel, stage 3 [L89.623] 10/19/2022 Yes    Gastroparesis [K31.84] 08/18/2022 Yes    Slow transit constipation [K59.01] 07/07/2022 Yes    Ulcer of right foot with fat layer exposed [L97.512] 06/28/2022 Yes    PAD (peripheral artery disease) [I73.9] 06/27/2022 Yes    Encounter for long-term (current) use of antibiotics [Z79.2]  Not Applicable    Type 2 diabetes mellitus with kidney complication, with long-term current use of insulin [E11.29, Z79.4] 06/25/2020 Not Applicable    ESRD (end stage renal disease) [N18.6] 05/18/2018 Yes    Hypertension associated with diabetes [E11.59, I15.2] 06/13/2013 Yes     Chronic    Hyperlipidemia associated with type 2 diabetes mellitus [E11.69, E78.5] 06/22/2012 Yes      Problems Resolved During this Admission:    Diagnosis Date Noted Date Resolved POA    PRINCIPAL PROBLEM:  Syncope [R55] 11/02/2022 11/04/2022 Yes       Discharged Condition: stable    Disposition: Home-Health Care [facility]    Follow Up:   Follow-up Information     Stefano Lopez MD. Go on 11/8/2022.    Specialty: Family Medicine  Why: 10:40am  Contact information:  [address] [address]  [phone]             Kelley Arias MD. Call in 2 week(s).    Specialty: Cardiology  Why: Calll today Discuss about stress echo that was planned in April 2022  Contact information:  [address]  [address]  [place] [address]  [phone]             [facility]. Schedule an appointment as soon as possible for a visit.    Specialties: Home Health Services, Home Therapy Services, Home Living Aide Services  Why: home health  Contact information:  [address]  [facility] [id number] [phone]                     Patient Instructions:      Ambulatory referral/consult to Home Health   Standing Status: Future   Referral Priority: Routine Referral Type: Home Health Care   Referral Reason: Specialty Services Required    Requested Specialty: Home Health Services   Number of Visits Requested: 1     Diet renal     Diet diabetic     Notify your health care provider if you experience any of the following:  persistent nausea and vomiting or diarrhea     Notify your health care provider if you experience any of the following:  severe uncontrolled pain     Notify your health care provider if you experience any of the following:  difficulty breathing or increased cough     Notify your health care provider if you experience any of the following:  persistent dizziness, light-headedness, or visual disturbances     Notify your health care provider if you experience any of the following:  increased confusion or weakness     Notify your health care provider if you experience any of the following:   Order Comments: Worsening or recurrent symptoms     Activity as tolerated       Significant Diagnostic Studies: Labs:   CMP   Recent Labs   Lab 11/02/22  1625 11/03/22  0044 11/03/22 0450 11/04/22  0342    137 136 135*   K 5.5* 4.7 4.6 4.8   CL 90* 93* 92* 100   CO2 29 30* 29 21*   * 277* 217* 216*   BUN 62* 70* 73* 60*   CREATININE 6.1* 6.7* 7.0* 6.5*   CALCIUM 10.3 9.7 9.4 9.1   PROT 9.4*  --  7.3  --    ALBUMIN 4.7  --  3.7  --    BILITOT 0.5  --  0.7  --    ALKPHOS 105  --  79  --    AST 33  --  16  --    ALT 39  --  27  --    ANIONGAP 17* 14 15 14   , CBC   Recent Labs   Lab 11/02/22  1625 11/03/22 0450 11/04/22  0342   WBC 9.58 10.68 9.39   HGB 13.4 11.1* 10.6*   HCT 41.6 35.6* 34.2*    244 224    and Troponin   Recent Labs   Lab 11/02/22  1625 11/03/22  0044 11/03/22 0450   TROPONINI <0.030 <0.030 <0.030       Pending Diagnostic Studies:     None         Medications:  Reconciled Home Medications:      Medication List      START taking these medications    dextromethorphan-guaiFENesin  mg/5 ml  mg/5 mL liquid  Commonly known as: ROBITUSSIN-DM  Take 5 mLs by mouth every 4 (four) hours as needed (cough).       "  CHANGE how you take these medications    blood sugar diagnostic Strp  To check BG 4 times daily, to use with insurance preferred meter  What changed:   · how much to take  · how to take this  · when to take this     fluticasone propionate 50 mcg/actuation nasal spray  Commonly known as: FLONASE  2 sprays (100 mcg total) by Each Nostril route once daily.  What changed: when to take this     hydrALAZINE 25 MG tablet  Commonly known as: APRESOLINE  Take 1 tablet (25 mg total) by mouth 2 (two) times daily as needed (Systolic blood pressure > 170 mm Hg).  What changed:   · medication strength  · how much to take  · when to take this  · reasons to take this     insulin aspart U-100 100 unit/mL (3 mL) Inpn pen  Commonly known as: NovoLOG Flexpen U-100 Insulin  Inject 5 Units into the skin 3 (three) times daily with meals.  What changed:   · how much to take  · additional instructions     pen needle, diabetic 32 gauge x 5/32" Ndle  Commonly known as: BD ULTRA-FINE ROBERT PEN NEEDLE  To use 4 times per day with insulin injections.  What changed:   · how much to take  · how to take this  · when to take this        CONTINUE taking these medications    acetaminophen 325 MG tablet  Commonly known as: TYLENOL  Take 325 mg by mouth every 6 (six) hours.     atorvastatin 80 MG tablet  Commonly known as: LIPITOR  Take 1 tablet (80 mg total) by mouth once daily.     cetirizine 10 MG tablet  Commonly known as: ZYRTEC  Take 1 tablet (10 mg total) by mouth every other day.     clopidogreL 75 mg tablet  Commonly known as: PLAVIX  Take 1 tablet (75 mg total) by mouth once daily.     epoetin chris-epbx 4,000 unit/mL injection  Commonly known as: RETACRIT  Inject 1.11 mLs (4,440 Units total) into the skin every Mon, Wed, Fri.     gabapentin 100 MG capsule  Commonly known as: NEURONTIN  Take 1 capsule (100 mg total) by mouth 2 (two) times daily.     insulin detemir U-100 100 unit/mL injection  Commonly known as: Levemir  Inject 15 Units into " the skin every evening.     lactulose 10 gram/15 mL solution  Commonly known as: CHRONULAC  Take 20 g by mouth 2 (two) times a day.     minoxidiL 10 MG Tab  Commonly known as: LONITEN  Take 10 mg by mouth 2 (two) times daily.     ondansetron 8 MG Tbdl  Commonly known as: ZOFRAN-ODT  Take 1 tablet (8 mg total) by mouth every 8 (eight) hours as needed (nausea).     oxyCODONE 5 MG immediate release tablet  Commonly known as: ROXICODONE  Take 1 tablet (5 mg total) by mouth every 6 (six) hours as needed for Pain.     polyethylene glycol 17 gram Pwpk  Commonly known as: GLYCOLAX  Take 17 g by mouth 2 (two) times daily as needed.     senna-docusate 8.6-50 mg 8.6-50 mg per tablet  Commonly known as: PERICOLACE  Take 1 tablet by mouth 2 (two) times daily.        STOP taking these medications    doxycycline 100 MG capsule  Commonly known as: MONODOX            Indwelling Lines/Drains at time of discharge:   Lines/Drains/Airways     Drain  Duration                Hemodialysis AV Fistula Left upper arm -- days                Time spent on the discharge of patient: 32 minutes         Andrea Montoya MD  Department of Hospital Medicine  [facility]

## 2022-11-04 NOTE — PROGRESS NOTES
INPATIENT NEPHROLOGY CONSULT   [facility] NEPHROLOGY    Leanna García  11/04/2022    Reason for consultation:    esrd    Chief Complaint:   Chief Complaint   Patient presents with    syncopal episode during dialysis          History of Present Illness:      Per H and  P    Leanna García is a 57 y.o. female with a history as  has a past medical history of A-fib, Anemia due to end stage renal disease (6/30/2022), Arthritis, Bronchitis, Cardiovascular event risk, ASCVD 10-year risk 6.7% (10/15/2016), Diabetes mellitus type II, Diabetic foot infection, Diabetic ulcer of left midfoot (05/05/2022), Disorder of kidney and ureter, Encounter for blood transfusion, ESRD (end stage renal disease) (05/18/2018), MANJINDER (generalized anxiety disorder) (10/25/2016), History of colon polyps (11/02/2016), Hyperlipidemia, Hypertension, and Stroke. who presented to the ED with a syncopal episode during dialysis    11/3  No nausea, chest pain, sob, fever, urinary or bowel complaint, new neurologic symptoms, new joint pain   Patient seen on hemodialysis for uremic clearance and ultrafiltration.      11/4  Patient seen on hemodialysis for uremic clearance and ultrafiltration.    No chest pain or sob      Plan of Care:       Assessment:    esrd  --continue dialysis per routine  --fluid restrict  --renal dose medication per routine  --continue outpt medication  --continue binders with meals    Anemia  --erythropoiesis stimulating agent with renal replacement therapy    Hyperphosphatemia  --renal diet  --continue binders    Hypertension  --uf with hd  --fluid restrict  --low salt diet  --continue home medication           Thank you for allowing us to participate in this patient's care. We will continue to follow.    Vital Signs:  Temp Readings from Last 3 Encounters:   11/04/22 98 °F (36.7 °C)   11/01/22 98.4 °F (36.9 °C)   10/25/22 98.4 °F (36.9 °C)       Pulse Readings from Last 3 Encounters:   11/04/22 70   11/01/22 76   10/25/22 77        BP Readings from Last 3 Encounters:   11/04/22 (!) 108/47   11/01/22 (!) 142/87   10/25/22 (!) 137/93       Weight:  Wt Readings from Last 3 Encounters:   11/03/22 81.8 kg (180 lb 5.4 oz)   10/19/22 84.1 kg (185 lb 6.5 oz)   09/27/22 79.4 kg (175 lb)       Past Medical & Surgical History:  Past Medical History:   Diagnosis Date    A-fib     resolved per patient    Anemia due to end stage renal disease 6/30/2022    Arthritis     Bronchitis     Cardiovascular event risk, ASCVD 10-year risk 6.7% 10/15/2016    Diabetes mellitus type II     Diabetic foot infection     Diabetic ulcer of left midfoot 05/05/2022    Disorder of kidney and ureter     Encounter for blood transfusion     ESRD (end stage renal disease) 05/18/2018    MANJINDER (generalized anxiety disorder) 10/25/2016    History of colon polyps 11/02/2016    Hyperlipidemia     Hypertension     Stroke        Past Surgical History:   Procedure Laterality Date    ANGIOGRAPHY OF LOWER EXTREMITY Left 10/18/2022    Procedure: Angiogram Extremity Unilateral;  Surgeon: Ali Khoobehi, MD;  Location: [facility] CATH/EP LAB;  Service: Vascular;  Laterality: Left;    AV FISTULA PLACEMENT Left 8/29/2022    Procedure: CREATION, AV FISTULA;  Surgeon: Ali Khoobehi, MD;  Location: [facility] OR;  Service: Vascular;  Laterality: Left;    BONE BIOPSY Left 8/24/2022    Procedure: Biopsy-Bone;  Surgeon: Porfirio Ponce DPM;  Location: [facility] OR;  Service: Podiatry;  Laterality: Left;    COLONOSCOPY N/A 10/5/2016    Procedure: COLONOSCOPY;  Surgeon: Pillo Chanel MD;  Location: [facility] ENDO;  Service: Endoscopy;  Laterality: N/A;    COLONOSCOPY N/A 4/26/2022    Procedure: COLONOSCOPY;  Surgeon: Saravanan Rachel MD;  Location: [facility] ENDO;  Service: Endoscopy;  Laterality: N/A;    FISTULOGRAM Left 8/24/2022    Procedure: Fistulogram;  Surgeon: Ali Khoobehi, MD;  Location: [facility] CATH/EP LAB;  Service: Vascular;  Laterality: Left;    HERNIA REPAIR Bilateral 11/22/2016    inguinal    HYSTERECTOMY       INCISION AND DRAINAGE FOOT Left 5/13/2022    Procedure: INCISION AND DRAINAGE, FOOT;  Surgeon: Ricardo Bruno DPM;  Location: [facility] OR;  Service: Podiatry;  Laterality: Left;    OOPHORECTOMY      THROMBECTOMY, AV FISTULA, UPPER EXTREMITY, PERCUTANEOUS  8/24/2022    Procedure: THROMBECTOMY, AV FISTULA, UPPER EXTREMITY, PERCUTANEOUS;  Surgeon: Ali Khoobehi, MD;  Location: [facility] CATH/EP LAB;  Service: Vascular;;    TOE AMPUTATION Left 8/26/2022    Procedure: AMPUTATION, TOE;  Surgeon: Porfirio Ponce DPM;  Location: [facility] OR;  Service: Podiatry;  Laterality: Left;       Past Social History:  Social History     Socioeconomic History    Marital status:    Tobacco Use    Smoking status: Never    Smokeless tobacco: Never   Substance and Sexual Activity    Alcohol use: No    Drug use: No    Sexual activity: Yes     Partners: Male   Social History Narrative    Caregiver      Social Determinants of Health     Financial Resource Strain: Low Risk     Difficulty of Paying Living Expenses: Not hard at all   Food Insecurity: No Food Insecurity    Worried About Running Out of Food in the Last Year: Never true    Ran Out of Food in the Last Year: Never true   Transportation Needs: No Transportation Needs    Lack of Transportation (Medical): No    Lack of Transportation (Non-Medical): No   Physical Activity: Insufficiently Active    Days of Exercise per Week: 2 days    Minutes of Exercise per Session: 60 min   Stress: No Stress Concern Present    Feeling of Stress : Not at all   Social Connections: Moderately Isolated    Frequency of Communication with Friends and Family: More than three times a week    Frequency of Social Gatherings with Friends and Family: More than three times a week    Attends Religion Services: Never    Active Member of Clubs or Organizations: No    Attends Club or Organization Meetings: Never    Marital Status:    Housing Stability: Unknown    Unable to Pay for Housing in the Last Year:  "No    Unstable Housing in the Last Year: No       Medications:  No current facility-administered medications on file prior to encounter.     Current Outpatient Medications on File Prior to Encounter   Medication Sig Dispense Refill    atorvastatin (LIPITOR) 80 MG tablet Take 1 tablet (80 mg total) by mouth once daily. 90 tablet 3    blood sugar diagnostic Strp To check BG 4 times daily, to use with insurance preferred meter (Patient taking differently: 1 strip by Misc.(Non-Drug; Combo Route) route 4 (four) times daily. To check BG 4 times daily, to use with insurance preferred meter) 450 strip 3    cetirizine (ZYRTEC) 10 MG tablet Take 1 tablet (10 mg total) by mouth every other day. 45 tablet 3    clopidogreL (PLAVIX) 75 mg tablet Take 1 tablet (75 mg total) by mouth once daily. 90 tablet 3    fluticasone propionate (FLONASE) 50 mcg/actuation nasal spray 2 sprays (100 mcg total) by Each Nostril route once daily. (Patient taking differently: 2 sprays by Each Nostril route 2 (two) times a day.) 16 g 3    gabapentin (NEURONTIN) 100 MG capsule Take 1 capsule (100 mg total) by mouth 2 (two) times daily. 180 capsule 3    hydrALAZINE (APRESOLINE) 50 MG tablet Take 50 mg by mouth every 12 (twelve) hours.      insulin aspart U-100 (NOVOLOG FLEXPEN U-100 INSULIN) 100 unit/mL (3 mL) InPn pen Inject 5 Units into the skin 3 (three) times daily with meals. (Patient taking differently: Inject 3 Units into the skin 3 (three) times daily with meals. Sliding scale) 3 mL 6    insulin detemir U-100 (LEVEMIR) 100 unit/mL injection Inject 15 Units into the skin every evening.      minoxidiL (LONITEN) 10 MG Tab Take 10 mg by mouth 2 (two) times daily.      pen needle, diabetic (BD ULTRA-FINE ROBERT PEN NEEDLE) 32 gauge x 5/32" Ndle To use 4 times per day with insulin injections. (Patient taking differently: 1 pen by Misc.(Non-Drug; Combo Route) route 4 (four) times daily. To use 4 times per day with insulin injections.) 450 each 2    " acetaminophen (TYLENOL) 325 MG tablet Take 325 mg by mouth every 6 (six) hours.      [] doxycycline (MONODOX) 100 MG capsule Take 1 capsule (100 mg total) by mouth 2 (two) times daily. for 14 days 28 capsule 0    epoetin chris-epbx (RETACRIT) 4,000 unit/mL injection Inject 1.11 mLs (4,440 Units total) into the skin every Mon, Wed, Fri.      lactulose (CHRONULAC) 10 gram/15 mL solution Take 20 g by mouth 2 (two) times a day.      ondansetron (ZOFRAN-ODT) 8 MG TbDL Take 1 tablet (8 mg total) by mouth every 8 (eight) hours as needed (nausea).      oxyCODONE (ROXICODONE) 5 MG immediate release tablet Take 1 tablet (5 mg total) by mouth every 6 (six) hours as needed for Pain.      polyethylene glycol (GLYCOLAX) 17 gram PwPk Take 17 g by mouth 2 (two) times daily as needed.      senna-docusate 8.6-50 mg (PERICOLACE) 8.6-50 mg per tablet Take 1 tablet by mouth 2 (two) times daily.      [DISCONTINUED] blood-glucose meter (ACCU-CHEK KAYLIE PLUS METER) Misc To use to check blood sugars 4 times a day 1 each 0    [DISCONTINUED] clorazepate (TRANXENE) 3.75 MG Tab Take 7.5 mg by mouth nightly as needed.       [DISCONTINUED] dextrose (GLUCOSE GEL) 40 % gel Take 37.5 mLs (15,000 mg total) by mouth once as needed (hypoglycemia). 37.5 g 4    [DISCONTINUED] ezetimibe (ZETIA) 10 mg tablet Take 1 tablet (10 mg total) by mouth once daily.      [DISCONTINUED] fenofibrate 160 MG Tab Take 1 tablet (160 mg total) by mouth once daily. 90 tablet 3    [DISCONTINUED] lancing device with lancets (ACCU-CHEK SOFT DEV LANCETS) Kit To check blood sugars 4 times per day 400 each 3    [DISCONTINUED] pantoprazole (PROTONIX) 40 MG tablet Take 1 tablet (40 mg total) by mouth once daily.       Scheduled Meds:   sodium chloride 0.9%   Intravenous Once    sodium chloride 0.9%   Intravenous Once    atorvastatin  80 mg Oral Daily    cetirizine  10 mg Oral Every other day    clopidogreL  75 mg Oral Daily    doxycycline  100 mg Oral Q12H    epoetin  "chris-epbx  50 Units/kg Intravenous Every Mon, Wed, Fri    gabapentin  100 mg Oral BID    insulin detemir U-100  7 Units Subcutaneous QHS    mupirocin   Nasal BID     Continuous Infusions:  PRN Meds:.sodium chloride 0.9%, sodium chloride 0.9%, acetaminophen, dextromethorphan-guaiFENesin  mg/5 ml, dextrose 10%, dextrose 10%, glucagon (human recombinant), glucose, glucose, heparin (porcine), hydrALAZINE, hydrALAZINE, insulin aspart U-100, sodium chloride 0.9%, sodium chloride 0.9%, sodium chloride 0.9%    Allergies:  Dilaudid [hydromorphone], Chlorhexidine, and Lortab [hydrocodone-acetaminophen]    Past Family History:  Reviewed; refer to Hospitalist Admission Note    Review of Systems:  Review of Systems - All 14 systems reviewed and negative, except as noted in HPI    Physical Exam:    BP (!) 108/47   Pulse 70   Temp 98 °F (36.7 °C)   Resp 18   Ht 5' 8" (1.727 m)   Wt 81.8 kg (180 lb 5.4 oz)   SpO2 (!) 94%   Breastfeeding No   BMI 27.42 kg/m²     General Appearance:    Alert, cooperative, no distress, appears stated age   Head:    Normocephalic, without obvious abnormality, atraumatic   Eyes:    PER, conjunctiva/corneas clear, EOM's intact in both eyes        Throat:   Lips, mucosa, and tongue normal; teeth and gums normal   Back:     Symmetric, no curvature, ROM normal, no CVA tenderness   Lungs:     Clear to auscultation bilaterally, respirations unlabored   Chest wall:    No tenderness or deformity   Heart:    Regular rate and rhythm, S1 and S2 normal, no murmur, rub   or gallop   Abdomen:     Soft, non-tender, bowel sounds active all four quadrants,     no masses, no organomegaly   Extremities:   Extremities normal, atraumatic, no cyanosis or edema   Pulses:   2+ and symmetric all extremities   MSK:   No joint or muscle swelling, tenderness or deformity   Skin:   Skin color, texture, turgor normal, no rashes or lesions   Neurologic:   CNII-XII intact, normal strength and sensation       Throughout.  " No flap     Results:  Lab Results   Component Value Date     (L) 11/04/2022    K 4.8 11/04/2022     11/04/2022    CO2 21 (L) 11/04/2022    BUN 60 (H) 11/04/2022    CREATININE 6.5 (H) 11/04/2022    CALCIUM 9.1 11/04/2022    ANIONGAP 14 11/04/2022    ESTGFRAFRICA 5 (A) 07/29/2022    EGFRNONAA 5 (A) 07/29/2022       Lab Results   Component Value Date    CALCIUM 9.1 11/04/2022    PHOS 7.6 (H) 11/03/2022       Recent Labs   Lab 11/04/22  0342   WBC 9.39   RBC 3.70*   HGB 10.6*   HCT 34.2*      MCV 92   MCH 28.6   MCHC 31.0*            I have personally reviewed pertinent radiological imaging and reports.      Patient care was time spent personally by me on the following activities:   Obtaining a history  Examination of patient.  Providing medical care at the patients bedside.  Developing a treatment plan with patient or surrogate and bedside caregivers  Ordering and reviewing laboratory studies, radiographic studies, pulse oximetry.  Ordering and performing treatments and interventions.  Evaluation of patient's response to treatment.  Discussions with consultants while on the unit and immediately available to the patient.  Re-evaluation of the patient's condition.  Documentation in the medical record.     .[email or web address]

## 2022-11-04 NOTE — PLAN OF CARE
Patient AAOx4, Vitals stable. Careplan explained. Instructed to call staff for mobility.Call light within reach.

## 2022-11-07 ENCOUNTER — DOCUMENT SCAN (OUTPATIENT)
Dept: HOME HEALTH SERVICES | Facility: HOSPITAL | Age: 57
End: 2022-11-07
Payer: MEDICARE

## 2022-11-08 ENCOUNTER — OFFICE VISIT (OUTPATIENT)
Dept: WOUND CARE | Facility: HOSPITAL | Age: 57
End: 2022-11-08
Attending: PODIATRIST
Payer: MEDICARE

## 2022-11-08 ENCOUNTER — OFFICE VISIT (OUTPATIENT)
Dept: FAMILY MEDICINE | Facility: CLINIC | Age: 57
End: 2022-11-08
Payer: MEDICARE

## 2022-11-08 VITALS
HEIGHT: 68 IN | DIASTOLIC BLOOD PRESSURE: 76 MMHG | WEIGHT: 180 LBS | OXYGEN SATURATION: 98 % | HEART RATE: 76 BPM | SYSTOLIC BLOOD PRESSURE: 158 MMHG | TEMPERATURE: 98 F | BODY MASS INDEX: 27.28 KG/M2 | RESPIRATION RATE: 18 BRPM

## 2022-11-08 VITALS
RESPIRATION RATE: 20 BRPM | HEART RATE: 76 BPM | TEMPERATURE: 99 F | SYSTOLIC BLOOD PRESSURE: 161 MMHG | DIASTOLIC BLOOD PRESSURE: 81 MMHG

## 2022-11-08 DIAGNOSIS — L97.523 ULCER OF LEFT FOOT WITH NECROSIS OF MUSCLE: ICD-10-CM

## 2022-11-08 DIAGNOSIS — E11.628 DIABETIC FOOT INFECTION: ICD-10-CM

## 2022-11-08 DIAGNOSIS — E11.641 TYPE 2 DIABETES MELLITUS WITH HYPOGLYCEMIA AND COMA, WITH LONG-TERM CURRENT USE OF INSULIN: ICD-10-CM

## 2022-11-08 DIAGNOSIS — E11.65 TYPE 2 DIABETES MELLITUS WITH HYPERGLYCEMIA, WITHOUT LONG-TERM CURRENT USE OF INSULIN: ICD-10-CM

## 2022-11-08 DIAGNOSIS — E11.649 TYPE 2 DIABETES MELLITUS WITH HYPOGLYCEMIA UNAWARENESS: ICD-10-CM

## 2022-11-08 DIAGNOSIS — L97.519 ULCER OF RIGHT FOOT, UNSPECIFIED ULCER STAGE: ICD-10-CM

## 2022-11-08 DIAGNOSIS — N18.6 TYPE 2 DIABETES MELLITUS WITH CHRONIC KIDNEY DISEASE ON CHRONIC DIALYSIS, WITH LONG-TERM CURRENT USE OF INSULIN: ICD-10-CM

## 2022-11-08 DIAGNOSIS — I73.9 PERIPHERAL VASCULAR DISEASE: ICD-10-CM

## 2022-11-08 DIAGNOSIS — R26.2 DIFFICULTY IN WALKING, NOT ELSEWHERE CLASSIFIED: ICD-10-CM

## 2022-11-08 DIAGNOSIS — E11.621 DIABETIC ULCER OF LEFT MIDFOOT ASSOCIATED WITH TYPE 2 DIABETES MELLITUS, WITH NECROSIS OF MUSCLE: ICD-10-CM

## 2022-11-08 DIAGNOSIS — R09.89 DECREASED PEDAL PULSES: ICD-10-CM

## 2022-11-08 DIAGNOSIS — J30.89 ALLERGIC RHINITIS DUE TO OTHER ALLERGIC TRIGGER, UNSPECIFIED SEASONALITY: ICD-10-CM

## 2022-11-08 DIAGNOSIS — Z79.4 TYPE 2 DIABETES MELLITUS WITH CHRONIC KIDNEY DISEASE ON CHRONIC DIALYSIS, WITH LONG-TERM CURRENT USE OF INSULIN: ICD-10-CM

## 2022-11-08 DIAGNOSIS — Z91.89 AT RISK FOR READMISSION TO HOSPITAL: ICD-10-CM

## 2022-11-08 DIAGNOSIS — N18.6 ESRD (END STAGE RENAL DISEASE): ICD-10-CM

## 2022-11-08 DIAGNOSIS — Z86.73 HISTORY OF TIA (TRANSIENT ISCHEMIC ATTACK): ICD-10-CM

## 2022-11-08 DIAGNOSIS — E11.22 TYPE 2 DIABETES MELLITUS WITH CHRONIC KIDNEY DISEASE ON CHRONIC DIALYSIS, WITH LONG-TERM CURRENT USE OF INSULIN: ICD-10-CM

## 2022-11-08 DIAGNOSIS — L08.9 DIABETIC FOOT INFECTION: ICD-10-CM

## 2022-11-08 DIAGNOSIS — Z91.89 AT HIGH RISK FOR INADEQUATE NUTRITIONAL INTAKE: ICD-10-CM

## 2022-11-08 DIAGNOSIS — Z89.432 HISTORY OF AMPUTATION OF LEFT FOOT: ICD-10-CM

## 2022-11-08 DIAGNOSIS — Z79.4 TYPE 2 DIABETES MELLITUS WITH HYPOGLYCEMIA AND COMA, WITH LONG-TERM CURRENT USE OF INSULIN: ICD-10-CM

## 2022-11-08 DIAGNOSIS — L97.423 HEEL ULCER, LEFT, WITH NECROSIS OF MUSCLE: Primary | ICD-10-CM

## 2022-11-08 DIAGNOSIS — L97.522 ULCER OF LEFT FOOT WITH FAT LAYER EXPOSED: ICD-10-CM

## 2022-11-08 DIAGNOSIS — Z99.2 TYPE 2 DIABETES MELLITUS WITH CHRONIC KIDNEY DISEASE ON CHRONIC DIALYSIS, WITH LONG-TERM CURRENT USE OF INSULIN: ICD-10-CM

## 2022-11-08 DIAGNOSIS — L97.423 DIABETIC ULCER OF LEFT MIDFOOT ASSOCIATED WITH TYPE 2 DIABETES MELLITUS, WITH NECROSIS OF MUSCLE: ICD-10-CM

## 2022-11-08 PROCEDURE — 3051F HG A1C>EQUAL 7.0%<8.0%: CPT | Mod: CPTII,S$GLB,, | Performed by: FAMILY MEDICINE

## 2022-11-08 PROCEDURE — 3066F PR DOCUMENTATION OF TREATMENT FOR NEPHROPATHY: ICD-10-PCS | Mod: CPTII,,, | Performed by: PODIATRIST

## 2022-11-08 PROCEDURE — 1111F PR DISCHARGE MEDS RECONCILED W/ CURRENT OUTPATIENT MED LIST: ICD-10-PCS | Mod: CPTII,S$GLB,, | Performed by: FAMILY MEDICINE

## 2022-11-08 PROCEDURE — 3077F PR MOST RECENT SYSTOLIC BLOOD PRESSURE >= 140 MM HG: ICD-10-PCS | Mod: CPTII,S$GLB,, | Performed by: FAMILY MEDICINE

## 2022-11-08 PROCEDURE — 1159F MED LIST DOCD IN RCRD: CPT | Mod: CPTII,,, | Performed by: PODIATRIST

## 2022-11-08 PROCEDURE — 3077F SYST BP >= 140 MM HG: CPT | Mod: CPTII,S$GLB,, | Performed by: FAMILY MEDICINE

## 2022-11-08 PROCEDURE — 4010F ACE/ARB THERAPY RXD/TAKEN: CPT | Mod: CPTII,S$GLB,, | Performed by: FAMILY MEDICINE

## 2022-11-08 PROCEDURE — 3066F PR DOCUMENTATION OF TREATMENT FOR NEPHROPATHY: ICD-10-PCS | Mod: CPTII,S$GLB,, | Performed by: FAMILY MEDICINE

## 2022-11-08 PROCEDURE — 3078F PR MOST RECENT DIASTOLIC BLOOD PRESSURE < 80 MM HG: ICD-10-PCS | Mod: CPTII,S$GLB,, | Performed by: FAMILY MEDICINE

## 2022-11-08 PROCEDURE — 4010F ACE/ARB THERAPY RXD/TAKEN: CPT | Mod: CPTII,,, | Performed by: PODIATRIST

## 2022-11-08 PROCEDURE — 3078F DIAST BP <80 MM HG: CPT | Mod: CPTII,S$GLB,, | Performed by: FAMILY MEDICINE

## 2022-11-08 PROCEDURE — 3051F HG A1C>EQUAL 7.0%<8.0%: CPT | Mod: CPTII,,, | Performed by: PODIATRIST

## 2022-11-08 PROCEDURE — 1111F DSCHRG MED/CURRENT MED MERGE: CPT | Mod: CPTII,,, | Performed by: PODIATRIST

## 2022-11-08 PROCEDURE — 11043 DBRDMT MUSC&/FSCA 1ST 20/<: CPT | Performed by: PODIATRIST

## 2022-11-08 PROCEDURE — 11043 DBRDMT MUSC&/FSCA 1ST 20/<: CPT | Mod: 79,,, | Performed by: PODIATRIST

## 2022-11-08 PROCEDURE — 1111F PR DISCHARGE MEDS RECONCILED W/ CURRENT OUTPATIENT MED LIST: ICD-10-PCS | Mod: CPTII,,, | Performed by: PODIATRIST

## 2022-11-08 PROCEDURE — 3077F PR MOST RECENT SYSTOLIC BLOOD PRESSURE >= 140 MM HG: ICD-10-PCS | Mod: CPTII,,, | Performed by: PODIATRIST

## 2022-11-08 PROCEDURE — 99999 PR PBB SHADOW E&M-EST. PATIENT-LVL V: CPT | Mod: PBBFAC,,, | Performed by: FAMILY MEDICINE

## 2022-11-08 PROCEDURE — 1159F PR MEDICATION LIST DOCUMENTED IN MEDICAL RECORD: ICD-10-PCS | Mod: CPTII,,, | Performed by: PODIATRIST

## 2022-11-08 PROCEDURE — 1159F MED LIST DOCD IN RCRD: CPT | Mod: CPTII,S$GLB,, | Performed by: FAMILY MEDICINE

## 2022-11-08 PROCEDURE — 3008F PR BODY MASS INDEX (BMI) DOCUMENTED: ICD-10-PCS | Mod: CPTII,S$GLB,, | Performed by: FAMILY MEDICINE

## 2022-11-08 PROCEDURE — 11043 PR DEBRIDEMENT, SKIN, SUB-Q TISSUE,MUSCLE,=<20 SQ CM: ICD-10-PCS | Mod: 79,,, | Performed by: PODIATRIST

## 2022-11-08 PROCEDURE — 3051F PR MOST RECENT HEMOGLOBIN A1C LEVEL 7.0 - < 8.0%: ICD-10-PCS | Mod: CPTII,,, | Performed by: PODIATRIST

## 2022-11-08 PROCEDURE — 99214 OFFICE O/P EST MOD 30 MIN: CPT | Mod: 24,25,, | Performed by: PODIATRIST

## 2022-11-08 PROCEDURE — 1160F PR REVIEW ALL MEDS BY PRESCRIBER/CLIN PHARMACIST DOCUMENTED: ICD-10-PCS | Mod: CPTII,,, | Performed by: PODIATRIST

## 2022-11-08 PROCEDURE — 3051F PR MOST RECENT HEMOGLOBIN A1C LEVEL 7.0 - < 8.0%: ICD-10-PCS | Mod: CPTII,S$GLB,, | Performed by: FAMILY MEDICINE

## 2022-11-08 PROCEDURE — 3079F PR MOST RECENT DIASTOLIC BLOOD PRESSURE 80-89 MM HG: ICD-10-PCS | Mod: CPTII,,, | Performed by: PODIATRIST

## 2022-11-08 PROCEDURE — 4010F PR ACE/ARB THEARPY RXD/TAKEN: ICD-10-PCS | Mod: CPTII,,, | Performed by: PODIATRIST

## 2022-11-08 PROCEDURE — 1160F RVW MEDS BY RX/DR IN RCRD: CPT | Mod: CPTII,,, | Performed by: PODIATRIST

## 2022-11-08 PROCEDURE — 99214 PR OFFICE/OUTPT VISIT, EST, LEVL IV, 30-39 MIN: ICD-10-PCS | Mod: 24,25,, | Performed by: PODIATRIST

## 2022-11-08 PROCEDURE — 3008F BODY MASS INDEX DOCD: CPT | Mod: CPTII,S$GLB,, | Performed by: FAMILY MEDICINE

## 2022-11-08 PROCEDURE — 3077F SYST BP >= 140 MM HG: CPT | Mod: CPTII,,, | Performed by: PODIATRIST

## 2022-11-08 PROCEDURE — 4010F PR ACE/ARB THEARPY RXD/TAKEN: ICD-10-PCS | Mod: CPTII,S$GLB,, | Performed by: FAMILY MEDICINE

## 2022-11-08 PROCEDURE — 3066F NEPHROPATHY DOC TX: CPT | Mod: CPTII,S$GLB,, | Performed by: FAMILY MEDICINE

## 2022-11-08 PROCEDURE — 99214 PR OFFICE/OUTPT VISIT, EST, LEVL IV, 30-39 MIN: ICD-10-PCS | Mod: S$GLB,,, | Performed by: FAMILY MEDICINE

## 2022-11-08 PROCEDURE — 3079F DIAST BP 80-89 MM HG: CPT | Mod: CPTII,,, | Performed by: PODIATRIST

## 2022-11-08 PROCEDURE — 3066F NEPHROPATHY DOC TX: CPT | Mod: CPTII,,, | Performed by: PODIATRIST

## 2022-11-08 PROCEDURE — 1159F PR MEDICATION LIST DOCUMENTED IN MEDICAL RECORD: ICD-10-PCS | Mod: CPTII,S$GLB,, | Performed by: FAMILY MEDICINE

## 2022-11-08 PROCEDURE — 99214 OFFICE O/P EST MOD 30 MIN: CPT | Mod: S$GLB,,, | Performed by: FAMILY MEDICINE

## 2022-11-08 PROCEDURE — 1111F DSCHRG MED/CURRENT MED MERGE: CPT | Mod: CPTII,S$GLB,, | Performed by: FAMILY MEDICINE

## 2022-11-08 PROCEDURE — 99999 PR PBB SHADOW E&M-EST. PATIENT-LVL V: ICD-10-PCS | Mod: PBBFAC,,, | Performed by: FAMILY MEDICINE

## 2022-11-08 RX ORDER — INSULIN PUMP SYRINGE, 3 ML
EACH MISCELLANEOUS
Qty: 1 EACH | Refills: 0 | Status: SHIPPED | OUTPATIENT
Start: 2022-11-08 | End: 2023-11-08

## 2022-11-08 RX ORDER — MULTIVITAMIN
1 TABLET ORAL DAILY
COMMUNITY
Start: 2022-06-10

## 2022-11-08 RX ORDER — CLOPIDOGREL BISULFATE 75 MG/1
75 TABLET ORAL DAILY
Qty: 90 TABLET | Refills: 3 | Status: SHIPPED | OUTPATIENT
Start: 2022-11-08 | End: 2023-12-12 | Stop reason: SDUPTHER

## 2022-11-08 RX ORDER — AZELASTINE 1 MG/ML
1 SPRAY, METERED NASAL 2 TIMES DAILY
Qty: 30 ML | Refills: 3 | Status: SHIPPED | OUTPATIENT
Start: 2022-11-08 | End: 2024-01-22

## 2022-11-08 RX ORDER — INSULIN ASPART 100 [IU]/ML
5 INJECTION, SOLUTION INTRAVENOUS; SUBCUTANEOUS
Qty: 3 ML | Refills: 6 | Status: SHIPPED | OUTPATIENT
Start: 2022-11-08 | End: 2022-12-30

## 2022-11-08 RX ORDER — CETIRIZINE HYDROCHLORIDE 10 MG/1
10 TABLET ORAL EVERY OTHER DAY
Qty: 45 TABLET | Refills: 3 | Status: SHIPPED | OUTPATIENT
Start: 2022-11-08 | End: 2024-01-25

## 2022-11-08 RX ORDER — LANCETS
EACH MISCELLANEOUS
Qty: 100 EACH | Refills: 1 | Status: SHIPPED | OUTPATIENT
Start: 2022-11-08 | End: 2023-12-12 | Stop reason: SDUPTHER

## 2022-11-08 RX ORDER — ALPRAZOLAM 0.5 MG/1
1 TABLET ORAL
COMMUNITY
Start: 2022-06-08 | End: 2024-03-21

## 2022-11-08 RX ORDER — PEN NEEDLE, DIABETIC 30 GX3/16"
1 NEEDLE, DISPOSABLE MISCELLANEOUS 4 TIMES DAILY
Qty: 100 EACH | Refills: 1 | Status: SHIPPED | OUTPATIENT
Start: 2022-11-08

## 2022-11-08 NOTE — PROGRESS NOTES
Subjective:   Patient ID: Leanna García is a 57 y.o. female     Chief Complaint: Checkup    Here for checkup    Follow-up  Pertinent negatives include no abdominal pain or chest pain.   Review of Systems   Respiratory:  Negative for shortness of breath.    Cardiovascular:  Negative for chest pain.   Gastrointestinal:  Negative for abdominal pain.   Genitourinary:  Negative for dysuria.   Past Medical History:   Diagnosis Date    A-fib     resolved per patient    Anemia due to end stage renal disease 6/30/2022    Arthritis     Bronchitis     Cardiovascular event risk, ASCVD 10-year risk 6.7% 10/15/2016    Diabetes mellitus type II     Diabetic foot infection     Diabetic ulcer of left midfoot 05/05/2022    Disorder of kidney and ureter     Encounter for blood transfusion     ESRD (end stage renal disease) 05/18/2018    MANJINDER (generalized anxiety disorder) 10/25/2016    History of colon polyps 11/02/2016    Hyperlipidemia     Hypertension     Stroke      Past Surgical History:   Procedure Laterality Date    ANGIOGRAPHY OF LOWER EXTREMITY Left 10/18/2022    Procedure: Angiogram Extremity Unilateral;  Surgeon: Ali Khoobehi, MD;  Location: [facility] CATH/EP LAB;  Service: Vascular;  Laterality: Left;    AV FISTULA PLACEMENT Left 8/29/2022    Procedure: CREATION, AV FISTULA;  Surgeon: Ali Khoobehi, MD;  Location: [facility] OR;  Service: Vascular;  Laterality: Left;    BONE BIOPSY Left 8/24/2022    Procedure: Biopsy-Bone;  Surgeon: Porfirio Ponce DPM;  Location: [facility] OR;  Service: Podiatry;  Laterality: Left;    COLONOSCOPY N/A 10/5/2016    Procedure: COLONOSCOPY;  Surgeon: Pillo Chanel MD;  Location: [facility] ENDO;  Service: Endoscopy;  Laterality: N/A;    COLONOSCOPY N/A 4/26/2022    Procedure: COLONOSCOPY;  Surgeon: Saravanan Rachel MD;  Location: [facility] ENDO;  Service: Endoscopy;  Laterality: N/A;    FISTULOGRAM Left 8/24/2022    Procedure: Fistulogram;  Surgeon: Ali Khoobehi, MD;  Location: [facility] CATH/EP LAB;   Goal Outcome Evaluation:  Plan of Care Reviewed With: patient        Progress: improving  Outcome Summary: vss. ra. dsg cdi. ambulates with x1 assist and walker. voiding per brp. pain tolerated with po prn meds. plan to dc to snf when appropriate. educated on fall prevention.   Service: Vascular;  Laterality: Left;    HERNIA REPAIR Bilateral 11/22/2016    inguinal    HYSTERECTOMY      INCISION AND DRAINAGE FOOT Left 5/13/2022    Procedure: INCISION AND DRAINAGE, FOOT;  Surgeon: Ricardo Bruno DPM;  Location: [facility] OR;  Service: Podiatry;  Laterality: Left;    OOPHORECTOMY      THROMBECTOMY, AV FISTULA, UPPER EXTREMITY, PERCUTANEOUS  8/24/2022    Procedure: THROMBECTOMY, AV FISTULA, UPPER EXTREMITY, PERCUTANEOUS;  Surgeon: Ali Khoobehi, MD;  Location: [facility] CATH/EP LAB;  Service: Vascular;;    TOE AMPUTATION Left 8/26/2022    Procedure: AMPUTATION, TOE;  Surgeon: Porfirio Ponce DPM;  Location: [facility] OR;  Service: Podiatry;  Laterality: Left;     Objective:     Vitals:    11/08/22 1012   BP: (!) 158/76   Pulse: 76   Resp: 18   Temp: 97.7 °F (36.5 °C)     Body mass index is 27.37 kg/m².  Physical Exam  Vitals and nursing note reviewed.   Constitutional:       Appearance: She is well-developed.   HENT:      Head: Normocephalic and atraumatic.   Eyes:      General: No scleral icterus.     Conjunctiva/sclera: Conjunctivae normal.   Cardiovascular:      Heart sounds: No murmur heard.  Pulmonary:      Effort: Pulmonary effort is normal. No respiratory distress.   Musculoskeletal:         General: No deformity. Normal range of motion.      Cervical back: Normal range of motion and neck supple.   Skin:     Coloration: Skin is not pale.      Findings: No rash.   Neurological:      Mental Status: She is alert and oriented to person, place, and time.   Psychiatric:         Behavior: Behavior normal.         Thought Content: Thought content normal.         Judgment: Judgment normal.     Assessment:     1. History of TIA (transient ischemic attack)    2. Allergic rhinitis due to other allergic trigger, unspecified seasonality    3. Type 2 diabetes mellitus with hyperglycemia, without long-term current use of insulin      Plan:   History of TIA (transient ischemic attack)  -     clopidogreL (PLAVIX) 75  "mg tablet; Take 1 tablet (75 mg total) by mouth once daily.  Dispense: 90 tablet; Refill: 3    Allergic rhinitis due to other allergic trigger, unspecified seasonality  -     cetirizine (ZYRTEC) 10 MG tablet; Take 1 tablet (10 mg total) by mouth every other day.  Dispense: 45 tablet; Refill: 3  -     azelastine (ASTELIN) 137 mcg (0.1 %) nasal spray; 1 spray (137 mcg total) by Nasal route 2 (two) times daily.  Dispense: 30 mL; Refill: 3    Type 2 diabetes mellitus with hyperglycemia, without long-term current use of insulin  -     insulin aspart U-100 (NOVOLOG FLEXPEN U-100 INSULIN) 100 unit/mL (3 mL) InPn pen; Inject 5 Units into the skin 3 (three) times daily with meals.  Dispense: 3 mL; Refill: 6  -     insulin detemir U-100 (LEVEMIR) 100 unit/mL injection; Inject 3 Units into the skin every evening.  Dispense: 2.7 mL; Refill: 0  -     pen needle, diabetic (BD ULTRA-FINE ROBERT PEN NEEDLE) 32 gauge x 5/32" Ndle; 1 pen by Misc.(Non-Drug; Combo Route) route 4 (four) times daily. To use 4 times per day with insulin injections.  Dispense: 100 each; Refill: 1  -     blood-glucose meter kit; To check BG 3 times daily, to use with insurance preferred meter  Dispense: 1 each; Refill: 0  -     lancets Misc; To check BG 3 times daily, to use with insurance preferred meter  Dispense: 100 each; Refill: 1  -     blood sugar diagnostic Strp; To check BG 3 times daily, to use with insurance preferred meter  Dispense: 100 strip; Refill: 1            Total time spent of Greater than 30 minutes minutes on the day of the visit.This includes face to face time and preparing to see the patient, obtaining and reviewing separately obtained history, documenting clinical information in the electronic or other health record, independently interpreting results and communicating results to the patient/family/caregiver, or care coordinator.    Established patient with me has been instructed that must see me at least 1 time yearly (every 365 " days) for refills of medications. Seeing other providers in this clinic is fine but expectation is to see me yearly.    Stefano Lopez MD  11/08/2022    Portions of this note have been dictated with BRIAN Perdomo

## 2022-11-08 NOTE — PATIENT INSTRUCTIONS
Meir Ram,     If you are due for any health screening(s) below please notify me so we can arrange them to be ordered and scheduled to maintain your health. Most healthy patients complete it. Don't lose out on improving your health.     Tests to Keep You Healthy    Mammogram: ORDERED BUT NOT SCHEDULED  Eye Exam: ORDERED BUT NOT SCHEDULED  Colon Cancer Screening: Met on 4/26/2022  Last HbA1c < 8 (10/19/2022): Yes      Breast Cancer Screening    Breast cancer is the second most common cancer in women after skin cancer, and the second leading cause of death from cancer after lung cancer. Mammograms can detect breast cancer early, which significantly increases the chances of curing the cancer.      A screening mammogram is an x-ray image of the breasts used for early breast cancer detection. It can help reduce the number of deaths from breast cancer among women. To get a clear image, the breast is placed between two plastic plates to make it flat. How often a mammogram is needed depends on your age and your breast cancer risk.    Although you are still overdue for this important screening, due to the COVID-19 pandemic, we recommend scheduling it in the near future.          Diabetic Retinal Eye Exam    Diabetes is the #1 cause of blindness in the [place] - early detection before signs or symptoms develop can prevent debilitating blindness.    Once-a-year screening is recommended for all diabetic patients. This exam can prevent and treat diabetes complications in the eye before developing symptoms. This can be done with a special camera is used to take photographs of the back of your eye without having to dilate them, or you can see an eye doctor for a full dilated exam.    Although you are still overdue for this important screening, due to the COVID-19 pandemic, we recommend scheduling it in the near future.        Patient Education       Checking Your Blood Pressure at Home   The Basics   Written by the doctors and editors  "at [employer]   How is blood pressure measured? -- Blood pressure is usually measured with a device that goes around your upper arm. This is often done in a doctor's office. But some people also check their blood pressure themselves, at home or at work.  Blood pressure is explained with 2 numbers. For instance, your blood pressure might be "140 over 90." The first (top) number is the pressure inside your arteries when your heart is binh. The second (bottom) number is the pressure inside your arteries when your heart is relaxed. The table shows how doctors and nurses define high and normal blood pressure (table 1).  If your blood pressure gets too high, it puts you at risk for heart attack, stroke, and kidney disease. High blood pressure does not usually cause symptoms. But it can be serious.  What is a home blood pressure meter? -- A home blood pressure meter (or "monitor") is a device you can use to check your blood pressure yourself. It has a cuff that goes around your upper arm (figure 1). Some devices have a cuff that goes around your wrist instead. But doctors aren't sure if these work as well. The meter also has a small screen, or dial, that shows your blood pressure numbers.  There are also special meters you can wear for a day or 2. These are different because they automatically check your blood pressure throughout the day and night, even while you are sleeping. If your doctor thinks you should use one of these devices, they will talk to you about how to wear it.  Why do I need to check my blood pressure at home? -- If your doctor knows or suspects that you have high blood pressure, they might want you to check it at home. There are a few reasons for this. Your doctor might want to look at:  Whether your blood pressure measures the same at home as it did in the doctor's office  How well your blood pressure medicines are working  Changes in your blood pressure, for example, if it goes up and down  People " who check their own blood pressure at home usually do better at keeping it low.  How do I choose a home blood pressure meter? -- When choosing a home blood pressure meter, you will probably want to think about:  Cost - Some devices cost more than others. You should also check to see if your insurance will help pay for your device.  Size - It's important to make sure the cuff fits your arm comfortably. Your doctor or nurse can help you with this.  How easy it is to use - You should make sure you understand how to use the device. You also need to be able to read the numbers on the screen.  You do not need a prescription to buy a home blood pressure meter. You can buy them at most pharmacies or over the internet. Your doctor or nurse can help you choose the right device for you.  How do I check my blood pressure at home? -- Once you have a home blood pressure meter, your doctor or nurse should check it to make sure it fits you and works correctly.  When it's time to check your blood pressure:  Go to the bathroom and empty your bladder first. Having a full bladder can temporarily increase your blood pressure, making the results inaccurate.  Sit in a chair with your feet flat on the ground.  Try to breathe normally and stay calm.  Attach the cuff to your arm. Place the cuff directly on your skin, not over your clothing. The cuff should be tight enough to not slip down, but not uncomfortably tight.  Sit and relax for about 3 to 5 minutes with the cuff on.  Follow the directions that came with your device to start measuring your blood pressure. This might involve squeezing the bulb at the end of the tube to inflate the cuff (fill it with air). With some monitors, you just need to press a button to inflate the cuff. When the cuff fills with air, it feels like someone is squeezing your arm, but it should not hurt. Then you will slowly deflate the cuff (let the air out of it), or it will deflate by itself. The screen or dial  will show your blood pressure numbers.  Stay seated and relax for 1 minute, then measure your blood pressure again.  How often should I check my blood pressure? -- It depends. Different people need to follow different schedules. Your doctor or nurse will tell you how often to check your blood pressure, and when. Some people need to check their blood pressure twice a day, in the morning and evening.  Your doctor or nurse will probably tell you to keep track of your blood pressure for at least a few days (table 2). Then they will look at the numbers. The reason for this is that it's normal for your blood pressure to change a bit from day to day. For example, the numbers might change depending on whether you recently had caffeine, just exercised, or feel stressed. Checking your blood pressure over several days - or longer - will give your doctor or nurse a better idea of what is average for you.  How should I keep track of my blood pressure? -- Some blood pressure meters will record your numbers for you, or send them to your computer or smartphone. If yours does not do this, you will need to write them down. Your doctor or nurse can help you figure out the best way to keep track of the numbers.  What if my blood pressure is high? -- Your doctor or nurse will tell you what to do if your blood pressure is high when you check it at home. If you get a number that is higher than normal, measure it again to see if it is still high. If it is very high (above a certain number, which your doctor or nurse will tell you to watch out for), you should call your doctor right away.  If your blood pressure is only a little high, your doctor or nurse might tell you to keep checking it for a few more days or weeks, and then call if it does not go back down. Then they can help you decide what to do next.  All topics are updated as new evidence becomes available and our peer review process is complete.  This topic retrieved from [employer]  "on: Sep 21, 2021.  Topic 346766 Version 4.0  Release: 29.4.2 - C29.263  © 2021 UpToDate, Inc. and/or its affiliates. All rights reserved.  table 1: Definition of normal and high blood pressure  Level  Top number  Bottom number    High 130 or above 80 or above   Elevated 120 to 129 79 or below   Normal 119 or below 79 or below   These definitions are from the American College of Cardiology/American Heart Association. Other expert groups might use slightly different definitions.  "Elevated blood pressure" is a term doctor or nurses use as a warning. It means you do not yet have high blood pressure, but your blood pressure is not as low as it should be for good health.  Graphic 89037 Version 6.0  figure 1: Using a home blood pressure meter     This is an example of a person using a home blood pressure meter.  Graphic 520060 Version 1.0    table 2: 7-day diary for checking blood pressure at home  Day 1  Day 2  Day 3  Day 4  Day 5  Day 6  Day 7    Morning  1st read Morning  1st read Morning  1st read Morning  1st read Morning  1st read Morning  1st read Morning  1st read   Systolic: __________ Systolic: __________ Systolic: __________ Systolic: __________ Systolic: __________ Systolic: __________ Systolic: __________   Diastolic: __________ Diastolic: __________ Diastolic: __________ Diastolic: __________ Diastolic: __________ Diastolic: __________ Diastolic: __________   Pulse: __________ Pulse: __________ Pulse: __________ Pulse: __________ Pulse: __________ Pulse: __________ Pulse: __________   Morning  2nd read Morning  2nd read Morning  2nd read Morning  2nd read Morning  2nd read Morning  2nd read Morning  2nd read   Systolic: __________ Systolic: __________ Systolic: __________ Systolic: __________ Systolic: __________ Systolic: __________ Systolic: __________   Diastolic: __________ Diastolic: __________ Diastolic: __________ Diastolic: __________ Diastolic: __________ Diastolic: __________ Diastolic: __________ "   Pulse: __________ Pulse: __________ Pulse: __________ Pulse: __________ Pulse: __________ Pulse: __________ Pulse: __________   Evening  1st read Evening  1st read Evening  1st read Evening  1st read Evening  1st read Evening  1st read Evening  1st read   Systolic: __________ Systolic: __________ Systolic: __________ Systolic: __________ Systolic: __________ Systolic: __________ Systolic: __________   Diastolic: __________ Diastolic: __________ Diastolic: __________ Diastolic: __________ Diastolic: __________ Diastolic: __________ Diastolic: __________   Pulse: __________ Pulse: __________ Pulse: __________ Pulse: __________ Pulse: __________ Pulse: __________ Pulse: __________   Evening  2nd read Evening  2nd read Evening  2nd read Evening  2nd read Evening  2nd read Evening  2nd read Evening  2nd read   Systolic: __________ Systolic: __________ Systolic: __________ Systolic: __________ Systolic: __________ Systolic: __________ Systolic: __________   Diastolic: __________ Diastolic: __________ Diastolic: __________ Diastolic: __________ Diastolic: __________ Diastolic: __________ Diastolic: __________   Pulse: __________ Pulse: __________ Pulse: __________ Pulse: __________ Pulse: __________ Pulse: __________ Pulse: __________   Notes    Notes    Notes    Notes    Notes    Notes    Notes      ____________________ ____________________ ____________________ ____________________ ____________________ ____________________ ____________________   ____________________ ____________________ ____________________ ____________________ ____________________ ____________________ ____________________   ____________________ ____________________ ____________________ ____________________ ____________________ ____________________ ____________________   Patient name: ______________________________     Patient ID: ________________________________    Primary care provider: _______________________    Average BP:  _______________________________    Graphic 615926 Version 1.0  Consumer Information Use and Disclaimer   This information is not specific medical advice and does not replace information you receive from your health care provider. This is only a brief summary of general information. It does NOT include all information about conditions, illnesses, injuries, tests, procedures, treatments, therapies, discharge instructions or life-style choices that may apply to you. You must talk with your health care provider for complete information about your health and treatment options. This information should not be used to decide whether or not to accept your health care provider's advice, instructions or recommendations. Only your health care provider has the knowledge and training to provide advice that is right for you. The use of this information is governed by the [employer] User License Agreement, available at https://www.[employer]/en/solutions/[employer]/about/félix.The use of [employer] content is governed by the [employer] Terms of Use. ©2021 [employer], Inc. All rights reserved.  Copyright   © 2021 Up[employer]Date, Inc. and/or its affiliates. All rights reserved.

## 2022-11-08 NOTE — PROGRESS NOTES
[address] [facility] [address]  [place] [address]  Phone: [phone]   [phone]    Patient's PCP:Stefano Lopez MD  Referring Provider: Aaareferral Self    Subjective:      Chief Complaint:: Wound Care, Wound Infection, Non-healing Wound, Pressure Ulcer, Diabetes, Diabetic Foot Ulcer, and Wound Check    HPI      Patient presents for follow-up of left plantar foot wound and left heel wound.       Please see Wound docs for full assessment and evaluation of all wounds.     Patient was recently hospitalized and had vascular intervention performed.  Please see below for discharge summary.        1. Debridement of left heel wound.  Evaluation and assessment only of left plantar foot wound. See Wound Docs for assessment of wounds and procedure notes  2. Continue taking all medications as prescribed  3. Continue home health dressing changes  4. RTC one week   5. Counseled patient on increasing protein intake, not getting wound wet, keeping dressing clean dry and intact, following a healthy diet, elevating legs when able, removing pressure from wound     Total time spent for E&M 35  Total time for debridement 35 minutes         NPWT is medically necessary for this patient at this time to acheive acceleration of granulation tissue and wound closure. It is my clinical judgement that this cannot be acheived using topical dressing or medications.     Counseling/Education:  I provided patient education verbally regarding:   The aspects of diabetes and how it pertains to the feet. I explained the importance of proper diabetic foot care and how it is essential for the health of their feet.     I discussed the importance of knowing their Hemoglobin A1c and that the level needs to be as close to 6 as possible. I discussed the increase complications of high blood sugar including stroke, blindness, heart attack, kidney failure and loss of limb secondary to neuropathy and PVD.      With neuropathy, beware of any breaks in  the skin or redness. These areas are not recognized early due to the numbness.     I discussed Diabetes, lower back issues, metabolic disorders, systemic causes, chemotherapy, vitamin deficiency, heavy metal exposure, as some of the causes. I also explained that as much as 40% of the time we can not find a cause. I discussed different treatments available to control the symptoms but which may not cure the problem.         Counseled patient on the aspects of diabetes and how it pertains to the feet.  I explained the importance of proper diabetic foot care and how it is essential for the health of their feet.        Shoe inspection. Patient instructed on proper foot hygeine. We discussed wearing proper shoe gear, daily foot inspections, never walking without protective shoe gear, never putting sharp instruments to feet, routine podiatric nail visits every 2-3 months.       Patient should call the office immediately if any signs of infection, such as fever, chills, sweats, increased redness or pain.     Patient was instructed to call the clinic or go to the emergency department if their symptoms do not improve, worsens, or if new symptoms develop.  Patient was advised that if any increased swelling, pain, or numbness arise to go immediately to the ED. Patient knows to call any time if an emergency arises. Shared decision making occurred and patient verbalized understanding in agreement with this plan.      I counseled the patient on their conditions, their implications and medical management.     >50% of this > 60 minute visit was spent face to face educating/counseling the patient     I spent a total of 60 minutes on the day of the visit.This includes face to face time and non-face to face time preparing to see the patient (eg, review of tests), obtaining and/or reviewing separately obtained history, documenting clinical information in the electronic or other health record, independently interpreting results and  communicating results to the patient/family/caregiver, or care coordinator.        Patient Name: Leanna García  MRN: [id number]  Patient Class: IP- Inpatient  Admission Date: 10/17/2022  Hospital Length of Stay: 2 days  Discharge Date and Time: 10/20/2022 11:05 AM  Attending Physician: Precious att. providers found   Discharging Provider: Pietro Shore MD  Primary Care Provider: Stefano Lopez MD        HPI:   57-year-old female with history of DM 2; ESRD on RRT; PAF, HLD, CAD, HTN, CVA, Non-healing DM foot ulcer was sent to ED for admission for angiography of lower extremities with possible surgical intervention for non-healing ulcer. She was recently in hospital for same. She has had osteomyelitis (bone biopsy grew Proteus) with eventual amputation of the 1st toe and partial 1st metatarsal head amputation on 8/26. This was followed by 2 weeks of cefepime as outpatient. Patient was discharged on clopidogrel, but reportedly did not start until a fortnight or so post discharge when she saw Podiatry for follow-up. She has since begun antiplatelet therapy. However, wound is not healing. Is for angiogram in AM with possible intervention pending angiography results. Denied chest discomfort. No SOB.      In ED: Labs reviewed: no leukocytosis with minimal normocytic anemia; trivial hyponatremia with end stage renal dysfunction. COVID negative. CXR reviewed: NAPD. EKG reviewed: sinus, left axis, no acute segments.     Discussed with ED MD: Inpatient admission to med/tele; continue home regimen for DM, ESRD, HTN, HLD and PAF; Nephrology for RRT; Vascular consult; Podiatry consult; Wound care consult; Insulin sliding scale        Procedure(s) (LRB):  Angiogram Extremity Unilateral (Left)       Hospital Course:   57-year-old with chronic left foot wound referred to emergency room by podiatrist and vascular surgery due to severe peripheral vascular disease and worsening left foot wound.  Unfortunately patient was not taking  any antiplatelets, she was evaluated by Infectious Disease, Podiatry and vascular surgery, she underwent single-vessel runoff and was found to have acceptable circulation.  She was cleared for discharge by multiple consultants, Infectious Disease recommended 14 more days of doxycycline.  She already has wound VAC on left foot.  Home health services were resumed.  Surprisingly patient was not requiring much insulin or antihypertensives.  We changed her insulin regimen as well as antihypertensive regimen and educated patient and .  She was advised to follow-up with her PCP and other outpatient providers.     I have seen the patient on the day of discharge and reviewed the discharge instructions as outlined below. Patient verbalized understanding and is aware to contact primary care physician or return to ED if new or worsening symptoms.         Goals of Care Treatment Preferences:  Code Status: Full Code           Much of the documentation for this visit was completed in the Wound Docs system.  Please see the attached documentation for further details about the patient's care. Scanned under the Media tab.         Alivia Bruno DPM        Vitals:    11/08/22 1544   BP: (!) 161/81   Pulse: 76   Resp: 20   Temp: 98.6 °F (37 °C)   PainSc: 0-No pain      Shoe Size:     Past Surgical History:   Procedure Laterality Date    ANGIOGRAPHY OF LOWER EXTREMITY Left 10/18/2022    Procedure: Angiogram Extremity Unilateral;  Surgeon: Ali Khoobehi, MD;  Location: [facility] CATH/EP LAB;  Service: Vascular;  Laterality: Left;    AV FISTULA PLACEMENT Left 8/29/2022    Procedure: CREATION, AV FISTULA;  Surgeon: Ali Khoobehi, MD;  Location: [facility] OR;  Service: Vascular;  Laterality: Left;    BONE BIOPSY Left 8/24/2022    Procedure: Biopsy-Bone;  Surgeon: Porfirio Ponce DPM;  Location: [facility] OR;  Service: Podiatry;  Laterality: Left;    COLONOSCOPY N/A 10/5/2016    Procedure: COLONOSCOPY;  Surgeon: Pillo Chanel MD;  Location: [facility]  ENDO;  Service: Endoscopy;  Laterality: N/A;    COLONOSCOPY N/A 4/26/2022    Procedure: COLONOSCOPY;  Surgeon: Saravanan Rachel MD;  Location: [facility] ENDO;  Service: Endoscopy;  Laterality: N/A;    FISTULOGRAM Left 8/24/2022    Procedure: Fistulogram;  Surgeon: Ali Khoobehi, MD;  Location: [facility] CATH/EP LAB;  Service: Vascular;  Laterality: Left;    HERNIA REPAIR Bilateral 11/22/2016    inguinal    HYSTERECTOMY      INCISION AND DRAINAGE FOOT Left 5/13/2022    Procedure: INCISION AND DRAINAGE, FOOT;  Surgeon: Ricardo Bruno DPM;  Location: [facility] OR;  Service: Podiatry;  Laterality: Left;    OOPHORECTOMY      THROMBECTOMY, AV FISTULA, UPPER EXTREMITY, PERCUTANEOUS  8/24/2022    Procedure: THROMBECTOMY, AV FISTULA, UPPER EXTREMITY, PERCUTANEOUS;  Surgeon: Ali Khoobehi, MD;  Location: [facility] CATH/EP LAB;  Service: Vascular;;    TOE AMPUTATION Left 8/26/2022    Procedure: AMPUTATION, TOE;  Surgeon: Porfirio Ponce DPM;  Location: [facility] OR;  Service: Podiatry;  Laterality: Left;     Past Medical History:   Diagnosis Date    A-fib     resolved per patient    Anemia due to end stage renal disease 6/30/2022    Arthritis     Bronchitis     Cardiovascular event risk, ASCVD 10-year risk 6.7% 10/15/2016    Diabetes mellitus type II     Diabetic foot infection     Diabetic ulcer of left midfoot 05/05/2022    Disorder of kidney and ureter     Encounter for blood transfusion     ESRD (end stage renal disease) 05/18/2018    MANJINDER (generalized anxiety disorder) 10/25/2016    History of colon polyps 11/02/2016    Hyperlipidemia     Hypertension     Stroke      Family History   Problem Relation Age of Onset    Hyperlipidemia Mother     Hypertension Mother     Hypertension Father     Diabetes Father     Diabetes Sister     Diabetes Sister     Diabetes Maternal Aunt     Diabetes Maternal Grandmother     Heart disease Maternal Grandmother     Cancer Paternal Grandmother     Breast cancer Neg Hx     Colon cancer Neg Hx     Ovarian cancer Neg  Hx         Social History:   Marital Status:   Alcohol History:  reports no history of alcohol use.  Tobacco History:  reports that she has never smoked. She has never used smokeless tobacco.  Drug History:  reports no history of drug use.    Review of patient's allergies indicates:   Allergen Reactions    Dilaudid [hydromorphone] Nausea And Vomiting    Chlorhexidine Itching    Lortab [hydrocodone-acetaminophen] Itching       Current Outpatient Medications   Medication Sig Dispense Refill    acetaminophen (TYLENOL) 325 MG tablet Take 325 mg by mouth every 6 (six) hours.      ALPRAZolam (XANAX) 0.5 MG tablet Take 1 tablet by mouth.      atorvastatin (LIPITOR) 80 MG tablet Take 1 tablet (80 mg total) by mouth once daily. 90 tablet 3    azelastine (ASTELIN) 137 mcg (0.1 %) nasal spray 1 spray (137 mcg total) by Nasal route 2 (two) times daily. 30 mL 3    blood sugar diagnostic Strp To check BG 4 times daily, to use with insurance preferred meter (Patient taking differently: 1 strip by Misc.(Non-Drug; Combo Route) route 4 (four) times daily. To check BG 4 times daily, to use with insurance preferred meter) 450 strip 3    blood sugar diagnostic Strp To check BG 3 times daily, to use with insurance preferred meter 100 strip 1    blood-glucose meter kit To check BG 3 times daily, to use with insurance preferred meter 1 each 0    cetirizine (ZYRTEC) 10 MG tablet Take 1 tablet (10 mg total) by mouth every other day. 45 tablet 3    clopidogreL (PLAVIX) 75 mg tablet Take 1 tablet (75 mg total) by mouth once daily. 90 tablet 3    dextromethorphan-guaiFENesin  mg/5 ml (ROBITUSSIN-DM)  mg/5 mL liquid Take 5 mLs by mouth every 4 (four) hours as needed (cough). 354 mL 0    epoetin chris-epbx (RETACRIT) 4,000 unit/mL injection Inject 1.11 mLs (4,440 Units total) into the skin every Mon, Wed, Fri.      fluticasone propionate (FLONASE) 50 mcg/actuation nasal spray 2 sprays (100 mcg total) by Each Nostril route once  "daily. 16 g 3    gabapentin (NEURONTIN) 100 MG capsule Take 1 capsule (100 mg total) by mouth 2 (two) times daily. 180 capsule 3    hydrALAZINE (APRESOLINE) 25 MG tablet Take 1 tablet (25 mg total) by mouth 2 (two) times daily as needed (Systolic blood pressure > 170 mm Hg).      insulin aspart U-100 (NOVOLOG FLEXPEN U-100 INSULIN) 100 unit/mL (3 mL) InPn pen Inject 5 Units into the skin 3 (three) times daily with meals. 3 mL 6    insulin detemir U-100 (LEVEMIR) 100 unit/mL injection Inject 3 Units into the skin every evening. 2.7 mL 0    lancets Misc To check BG 3 times daily, to use with insurance preferred meter 100 each 1    minoxidiL (LONITEN) 10 MG Tab Take 10 mg by mouth 2 (two) times daily.      multivitamin (THERAGRAN) per tablet Take 1 tablet by mouth.      pen needle, diabetic (BD ULTRA-FINE ROBERT PEN NEEDLE) 32 gauge x 5/32" Ndle 1 pen by Misc.(Non-Drug; Combo Route) route 4 (four) times daily. To use 4 times per day with insulin injections. 100 each 1     No current facility-administered medications for this visit.       Review of Systems   Constitutional:  Negative for chills, fatigue, fever and unexpected weight change.   HENT:  Negative for hearing loss and trouble swallowing.    Eyes:  Negative for photophobia and visual disturbance.   Respiratory:  Negative for cough, shortness of breath and wheezing.    Cardiovascular:  Negative for chest pain, palpitations and leg swelling.   Gastrointestinal:  Negative for abdominal pain and nausea.   Genitourinary:  Negative for dysuria and frequency.   Musculoskeletal:  Negative for arthralgias, back pain, gait problem, joint swelling and myalgias.   Skin:  Positive for wound. Negative for rash.   Neurological:  Negative for tremors, seizures, weakness, numbness and headaches.   Hematological:  Does not bruise/bleed easily.       Objective:        Physical Exam:   Foot Exam  Physical Exam  Ortho/SPM Exam     Imaging:            Assessment:       1. Heel ulcer, " left, with necrosis of muscle    2. Diabetic ulcer of left midfoot associated with type 2 diabetes mellitus, with necrosis of muscle    3. Ulcer of left foot with fat layer exposed    4. Type 2 diabetes mellitus with hypoglycemia and coma, with long-term current use of insulin    5. At high risk for inadequate nutritional intake    6. Peripheral vascular disease    7. History of TIA (transient ischemic attack)    8. Difficulty in walking, not elsewhere classified    9. Type 2 diabetes mellitus with chronic kidney disease on chronic dialysis, with long-term current use of insulin    10. 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    11. Ulcer of right foot, unspecified ulcer stage    12. Diabetic foot infection    13. ESRD (end stage renal disease)    14. At risk for readmission to hospital    15. History of amputation of left foot    16. Decreased pedal pulses    17. Ulcer of left foot with necrosis of muscle      Plan:   Heel ulcer, left, with necrosis of muscle    Diabetic ulcer of left midfoot associated with type 2 diabetes mellitus, with necrosis of muscle    Ulcer of left foot with fat layer exposed    Type 2 diabetes mellitus with hypoglycemia and coma, with long-term current use of insulin    At high risk for inadequate nutritional intake    Peripheral vascular disease    History of TIA (transient ischemic attack)    Difficulty in walking, not elsewhere classified    Type 2 diabetes mellitus with chronic kidney disease on chronic dialysis, with long-term current use of insulin    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    Ulcer of right foot, unspecified ulcer stage    Diabetic foot infection    ESRD (end stage renal disease)    At risk for readmission to hospital    History of amputation of left foot    Decreased pedal pulses    Ulcer of left foot with necrosis of muscle    Follow up in about 1 week (around 11/15/2022).    Procedures          Counseling:     I provided patient  education verbally regarding:   Patient diagnosis, treatment options, as well as alternatives, risks, and benefits.     This note was created using Dragon voice recognition software that occasionally misinterpreted phrases or words.

## 2022-11-10 ENCOUNTER — DOCUMENT SCAN (OUTPATIENT)
Dept: HOME HEALTH SERVICES | Facility: HOSPITAL | Age: 57
End: 2022-11-10
Payer: MEDICARE

## 2022-11-11 ENCOUNTER — EXTERNAL HOME HEALTH (OUTPATIENT)
Dept: HOME HEALTH SERVICES | Facility: HOSPITAL | Age: 57
End: 2022-11-11
Payer: MEDICARE

## 2022-11-15 ENCOUNTER — OFFICE VISIT (OUTPATIENT)
Dept: WOUND CARE | Facility: HOSPITAL | Age: 57
End: 2022-11-15
Attending: PODIATRIST
Payer: MEDICARE

## 2022-11-15 VITALS
HEART RATE: 82 BPM | DIASTOLIC BLOOD PRESSURE: 76 MMHG | RESPIRATION RATE: 20 BRPM | TEMPERATURE: 98 F | SYSTOLIC BLOOD PRESSURE: 126 MMHG

## 2022-11-15 DIAGNOSIS — E11.649 TYPE 2 DIABETES MELLITUS WITH HYPOGLYCEMIA UNAWARENESS: ICD-10-CM

## 2022-11-15 DIAGNOSIS — R09.89 DECREASED PEDAL PULSES: ICD-10-CM

## 2022-11-15 DIAGNOSIS — Z79.4 TYPE 2 DIABETES MELLITUS WITH HYPOGLYCEMIA AND COMA, WITH LONG-TERM CURRENT USE OF INSULIN: ICD-10-CM

## 2022-11-15 DIAGNOSIS — Z99.2 TYPE 2 DIABETES MELLITUS WITH CHRONIC KIDNEY DISEASE ON CHRONIC DIALYSIS, WITH LONG-TERM CURRENT USE OF INSULIN: ICD-10-CM

## 2022-11-15 DIAGNOSIS — L97.522 ULCER OF LEFT FOOT WITH FAT LAYER EXPOSED: ICD-10-CM

## 2022-11-15 DIAGNOSIS — R26.2 DIFFICULTY IN WALKING, NOT ELSEWHERE CLASSIFIED: ICD-10-CM

## 2022-11-15 DIAGNOSIS — L97.519 ULCER OF RIGHT FOOT, UNSPECIFIED ULCER STAGE: ICD-10-CM

## 2022-11-15 DIAGNOSIS — Z79.4 TYPE 2 DIABETES MELLITUS WITH CHRONIC KIDNEY DISEASE ON CHRONIC DIALYSIS, WITH LONG-TERM CURRENT USE OF INSULIN: ICD-10-CM

## 2022-11-15 DIAGNOSIS — E11.628 DIABETIC FOOT INFECTION: ICD-10-CM

## 2022-11-15 DIAGNOSIS — Z86.73 HISTORY OF TIA (TRANSIENT ISCHEMIC ATTACK): ICD-10-CM

## 2022-11-15 DIAGNOSIS — N18.6 ESRD (END STAGE RENAL DISEASE): ICD-10-CM

## 2022-11-15 DIAGNOSIS — L08.9 DIABETIC FOOT INFECTION: ICD-10-CM

## 2022-11-15 DIAGNOSIS — L97.423 DIABETIC ULCER OF LEFT MIDFOOT ASSOCIATED WITH TYPE 2 DIABETES MELLITUS, WITH NECROSIS OF MUSCLE: Primary | ICD-10-CM

## 2022-11-15 DIAGNOSIS — L97.423 HEEL ULCER, LEFT, WITH NECROSIS OF MUSCLE: ICD-10-CM

## 2022-11-15 DIAGNOSIS — E11.621 DIABETIC ULCER OF LEFT MIDFOOT ASSOCIATED WITH TYPE 2 DIABETES MELLITUS, WITH NECROSIS OF MUSCLE: Primary | ICD-10-CM

## 2022-11-15 DIAGNOSIS — Z91.89 AT HIGH RISK FOR INADEQUATE NUTRITIONAL INTAKE: ICD-10-CM

## 2022-11-15 DIAGNOSIS — N18.6 TYPE 2 DIABETES MELLITUS WITH CHRONIC KIDNEY DISEASE ON CHRONIC DIALYSIS, WITH LONG-TERM CURRENT USE OF INSULIN: ICD-10-CM

## 2022-11-15 DIAGNOSIS — Z89.432 HISTORY OF AMPUTATION OF LEFT FOOT: ICD-10-CM

## 2022-11-15 DIAGNOSIS — Z91.89 AT RISK FOR READMISSION TO HOSPITAL: ICD-10-CM

## 2022-11-15 DIAGNOSIS — E11.22 TYPE 2 DIABETES MELLITUS WITH CHRONIC KIDNEY DISEASE ON CHRONIC DIALYSIS, WITH LONG-TERM CURRENT USE OF INSULIN: ICD-10-CM

## 2022-11-15 DIAGNOSIS — L97.523 ULCER OF LEFT FOOT WITH NECROSIS OF MUSCLE: ICD-10-CM

## 2022-11-15 DIAGNOSIS — I73.9 PERIPHERAL VASCULAR DISEASE: ICD-10-CM

## 2022-11-15 DIAGNOSIS — E11.641 TYPE 2 DIABETES MELLITUS WITH HYPOGLYCEMIA AND COMA, WITH LONG-TERM CURRENT USE OF INSULIN: ICD-10-CM

## 2022-11-15 PROCEDURE — 1111F DSCHRG MED/CURRENT MED MERGE: CPT | Mod: CPTII,,, | Performed by: PODIATRIST

## 2022-11-15 PROCEDURE — 99214 PR OFFICE/OUTPT VISIT, EST, LEVL IV, 30-39 MIN: ICD-10-PCS | Mod: 25,,, | Performed by: PODIATRIST

## 2022-11-15 PROCEDURE — 11043 DBRDMT MUSC&/FSCA 1ST 20/<: CPT | Mod: ,,, | Performed by: PODIATRIST

## 2022-11-15 PROCEDURE — 4010F ACE/ARB THERAPY RXD/TAKEN: CPT | Mod: CPTII,,, | Performed by: PODIATRIST

## 2022-11-15 PROCEDURE — 1159F PR MEDICATION LIST DOCUMENTED IN MEDICAL RECORD: ICD-10-PCS | Mod: CPTII,,, | Performed by: PODIATRIST

## 2022-11-15 PROCEDURE — 3051F HG A1C>EQUAL 7.0%<8.0%: CPT | Mod: CPTII,,, | Performed by: PODIATRIST

## 2022-11-15 PROCEDURE — 3051F PR MOST RECENT HEMOGLOBIN A1C LEVEL 7.0 - < 8.0%: ICD-10-PCS | Mod: CPTII,,, | Performed by: PODIATRIST

## 2022-11-15 PROCEDURE — 11043 PR DEBRIDEMENT, SKIN, SUB-Q TISSUE,MUSCLE,=<20 SQ CM: ICD-10-PCS | Mod: ,,, | Performed by: PODIATRIST

## 2022-11-15 PROCEDURE — 3066F PR DOCUMENTATION OF TREATMENT FOR NEPHROPATHY: ICD-10-PCS | Mod: CPTII,,, | Performed by: PODIATRIST

## 2022-11-15 PROCEDURE — 3074F PR MOST RECENT SYSTOLIC BLOOD PRESSURE < 130 MM HG: ICD-10-PCS | Mod: CPTII,,, | Performed by: PODIATRIST

## 2022-11-15 PROCEDURE — 4010F PR ACE/ARB THEARPY RXD/TAKEN: ICD-10-PCS | Mod: CPTII,,, | Performed by: PODIATRIST

## 2022-11-15 PROCEDURE — 3078F DIAST BP <80 MM HG: CPT | Mod: CPTII,,, | Performed by: PODIATRIST

## 2022-11-15 PROCEDURE — 3066F NEPHROPATHY DOC TX: CPT | Mod: CPTII,,, | Performed by: PODIATRIST

## 2022-11-15 PROCEDURE — 3078F PR MOST RECENT DIASTOLIC BLOOD PRESSURE < 80 MM HG: ICD-10-PCS | Mod: CPTII,,, | Performed by: PODIATRIST

## 2022-11-15 PROCEDURE — 1160F PR REVIEW ALL MEDS BY PRESCRIBER/CLIN PHARMACIST DOCUMENTED: ICD-10-PCS | Mod: CPTII,,, | Performed by: PODIATRIST

## 2022-11-15 PROCEDURE — 1160F RVW MEDS BY RX/DR IN RCRD: CPT | Mod: CPTII,,, | Performed by: PODIATRIST

## 2022-11-15 PROCEDURE — 11043 DBRDMT MUSC&/FSCA 1ST 20/<: CPT | Performed by: PODIATRIST

## 2022-11-15 PROCEDURE — 99214 OFFICE O/P EST MOD 30 MIN: CPT | Mod: 25,,, | Performed by: PODIATRIST

## 2022-11-15 PROCEDURE — 1159F MED LIST DOCD IN RCRD: CPT | Mod: CPTII,,, | Performed by: PODIATRIST

## 2022-11-15 PROCEDURE — 3074F SYST BP LT 130 MM HG: CPT | Mod: CPTII,,, | Performed by: PODIATRIST

## 2022-11-15 PROCEDURE — 1111F PR DISCHARGE MEDS RECONCILED W/ CURRENT OUTPATIENT MED LIST: ICD-10-PCS | Mod: CPTII,,, | Performed by: PODIATRIST

## 2022-11-15 RX ORDER — DOXYCYCLINE 100 MG/1
100 CAPSULE ORAL 2 TIMES DAILY
Qty: 28 CAPSULE | Refills: 0 | Status: SHIPPED | OUTPATIENT
Start: 2022-11-15 | End: 2022-11-29

## 2022-11-15 NOTE — PROGRESS NOTES
[address]  [place] [address]  Phone: [phone]   [phone]    Patient's PCP:Stefano Lopez MD  Referring Provider: No ref. provider found    Subjective:      Chief Complaint:: Wound Care, Wound Infection, Non-healing Wound, Pressure Ulcer, Diabetes, Diabetic Foot Ulcer, and Wound Check    HPI     Patient presents for follow-up of left plantar foot wound and left heel wound.        Please see Wound docs for full assessment and evaluation of all wounds.     Patient was recently hospitalized and had vascular intervention performed.  Please see below for discharge summary.        1. Debridement of left heel wound.  Evaluation and assessment only of left plantar foot wound. See Wound Docs for assessment of wounds and procedure notes  2. Continue taking all medications as prescribed  3. Continue home health dressing changes  4. RTC one week   5. Counseled patient on increasing protein intake, not getting wound wet, keeping dressing clean dry and intact, following a healthy diet, elevating legs when able, removing pressure from wound     Total time spent for E&M 35  Total time for debridement 35 minutes         NPWT is medically necessary for this patient at this time to acheive acceleration of granulation tissue and wound closure. It is my clinical judgement that this cannot be acheived using topical dressing or medications.      Counseling/Education:  I provided patient education verbally regarding:   The aspects of diabetes and how it pertains to the feet. I explained the importance of proper diabetic foot care and how it is essential for the health of their feet.     I discussed the importance of knowing their Hemoglobin A1c and that the level needs to be as close to 6 as possible. I discussed the increase complications of high blood sugar including stroke, blindness, heart attack, kidney failure and loss of limb secondary to neuropathy and PVD.      With neuropathy, beware of any  breaks in the skin or redness. These areas are not recognized early due to the numbness.     I discussed Diabetes, lower back issues, metabolic disorders, systemic causes, chemotherapy, vitamin deficiency, heavy metal exposure, as some of the causes. I also explained that as much as 40% of the time we can not find a cause. I discussed different treatments available to control the symptoms but which may not cure the problem.         Counseled patient on the aspects of diabetes and how it pertains to the feet.  I explained the importance of proper diabetic foot care and how it is essential for the health of their feet.        Shoe inspection. Patient instructed on proper foot hygeine. We discussed wearing proper shoe gear, daily foot inspections, never walking without protective shoe gear, never putting sharp instruments to feet, routine podiatric nail visits every 2-3 months.       Patient should call the office immediately if any signs of infection, such as fever, chills, sweats, increased redness or pain.     Patient was instructed to call the clinic or go to the emergency department if their symptoms do not improve, worsens, or if new symptoms develop.  Patient was advised that if any increased swelling, pain, or numbness arise to go immediately to the ED. Patient knows to call any time if an emergency arises. Shared decision making occurred and patient verbalized understanding in agreement with this plan.      I counseled the patient on their conditions, their implications and medical management.     >50% of this > 60 minute visit was spent face to face educating/counseling the patient     I spent a total of 60 minutes on the day of the visit.This includes face to face time and non-face to face time preparing to see the patient (eg, review of tests), obtaining and/or reviewing separately obtained history, documenting clinical information in the electronic or other health record, independently interpreting results  and communicating results to the patient/family/caregiver, or care coordinator.        Patient Name: Leanna García  MRN: [id number]  Patient Class: IP- Inpatient  Admission Date: 10/17/2022  Hospital Length of Stay: 2 days  Discharge Date and Time: 10/20/2022 11:05 AM  Attending Physician: Precious att. providers found   Discharging Provider: Pietro Shore MD  Primary Care Provider: Stefano Lopez MD        HPI:   57-year-old female with history of DM 2; ESRD on RRT; PAF, HLD, CAD, HTN, CVA, Non-healing DM foot ulcer was sent to ED for admission for angiography of lower extremities with possible surgical intervention for non-healing ulcer. She was recently in hospital for same. She has had osteomyelitis (bone biopsy grew Proteus) with eventual amputation of the 1st toe and partial 1st metatarsal head amputation on 8/26. This was followed by 2 weeks of cefepime as outpatient. Patient was discharged on clopidogrel, but reportedly did not start until a fortnight or so post discharge when she saw Podiatry for follow-up. She has since begun antiplatelet therapy. However, wound is not healing. Is for angiogram in AM with possible intervention pending angiography results. Denied chest discomfort. No SOB.      In ED: Labs reviewed: no leukocytosis with minimal normocytic anemia; trivial hyponatremia with end stage renal dysfunction. COVID negative. CXR reviewed: NAPD. EKG reviewed: sinus, left axis, no acute segments.     Discussed with ED MD: Inpatient admission to med/tele; continue home regimen for DM, ESRD, HTN, HLD and PAF; Nephrology for RRT; Vascular consult; Podiatry consult; Wound care consult; Insulin sliding scale        Procedure(s) (LRB):  Angiogram Extremity Unilateral (Left)       Hospital Course:   57-year-old with chronic left foot wound referred to emergency room by podiatrist and vascular surgery due to severe peripheral vascular disease and worsening left foot wound.  Unfortunately patient was not  taking any antiplatelets, she was evaluated by Infectious Disease, Podiatry and vascular surgery, she underwent single-vessel runoff and was found to have acceptable circulation.  She was cleared for discharge by multiple consultants, Infectious Disease recommended 14 more days of doxycycline.  She already has wound VAC on left foot.  Home health services were resumed.  Surprisingly patient was not requiring much insulin or antihypertensives.  We changed her insulin regimen as well as antihypertensive regimen and educated patient and .  She was advised to follow-up with her PCP and other outpatient providers.     I have seen the patient on the day of discharge and reviewed the discharge instructions as outlined below. Patient verbalized understanding and is aware to contact primary care physician or return to ED if new or worsening symptoms.         Goals of Care Treatment Preferences:  Code Status: Full Code           Much of the documentation for this visit was completed in the Wound Docs system.  Please see the attached documentation for further details about the patient's care. Scanned under the Media tab.         Alivia Bruno DPM     Vitals:    11/15/22 1718   BP: 126/76   Pulse: 82   Resp: 20   Temp: 98.1 °F (36.7 °C)   PainSc: 0-No pain      Shoe Size:     Past Surgical History:   Procedure Laterality Date    ANGIOGRAPHY OF LOWER EXTREMITY Left 10/18/2022    Procedure: Angiogram Extremity Unilateral;  Surgeon: Ali Khoobehi, MD;  Location: [facility] CATH/EP LAB;  Service: Vascular;  Laterality: Left;    AV FISTULA PLACEMENT Left 8/29/2022    Procedure: CREATION, AV FISTULA;  Surgeon: Ali Khoobehi, MD;  Location: [facility] OR;  Service: Vascular;  Laterality: Left;    BONE BIOPSY Left 8/24/2022    Procedure: Biopsy-Bone;  Surgeon: Porfirio Ponce DPM;  Location: [facility] OR;  Service: Podiatry;  Laterality: Left;    COLONOSCOPY N/A 10/5/2016    Procedure: COLONOSCOPY;  Surgeon: Pillo Chanel MD;  Location:  [facility] ENDO;  Service: Endoscopy;  Laterality: N/A;    COLONOSCOPY N/A 4/26/2022    Procedure: COLONOSCOPY;  Surgeon: Saravanan Rachel MD;  Location: [facility] ENDO;  Service: Endoscopy;  Laterality: N/A;    FISTULOGRAM Left 8/24/2022    Procedure: Fistulogram;  Surgeon: Ali Khoobehi, MD;  Location: [facility] CATH/EP LAB;  Service: Vascular;  Laterality: Left;    HERNIA REPAIR Bilateral 11/22/2016    inguinal    HYSTERECTOMY      INCISION AND DRAINAGE FOOT Left 5/13/2022    Procedure: INCISION AND DRAINAGE, FOOT;  Surgeon: Ricardo Bruno DPM;  Location: [facility] OR;  Service: Podiatry;  Laterality: Left;    OOPHORECTOMY      THROMBECTOMY, AV FISTULA, UPPER EXTREMITY, PERCUTANEOUS  8/24/2022    Procedure: THROMBECTOMY, AV FISTULA, UPPER EXTREMITY, PERCUTANEOUS;  Surgeon: Ali Khoobehi, MD;  Location: [facility] CATH/EP LAB;  Service: Vascular;;    TOE AMPUTATION Left 8/26/2022    Procedure: AMPUTATION, TOE;  Surgeon: Porfirio Ponce DPM;  Location: [facility] OR;  Service: Podiatry;  Laterality: Left;     Past Medical History:   Diagnosis Date    A-fib     resolved per patient    Anemia due to end stage renal disease 6/30/2022    Arthritis     Bronchitis     Cardiovascular event risk, ASCVD 10-year risk 6.7% 10/15/2016    Diabetes mellitus type II     Diabetic foot infection     Diabetic ulcer of left midfoot 05/05/2022    Disorder of kidney and ureter     Encounter for blood transfusion     ESRD (end stage renal disease) 05/18/2018    MANJINDER (generalized anxiety disorder) 10/25/2016    History of colon polyps 11/02/2016    Hyperlipidemia     Hypertension     Stroke      Family History   Problem Relation Age of Onset    Hyperlipidemia Mother     Hypertension Mother     Hypertension Father     Diabetes Father     Diabetes Sister     Diabetes Sister     Diabetes Maternal Aunt     Diabetes Maternal Grandmother     Heart disease Maternal Grandmother     Cancer Paternal Grandmother     Breast cancer Neg Hx     Colon cancer Neg Hx     Ovarian cancer  Neg Hx         Social History:   Marital Status:   Alcohol History:  reports no history of alcohol use.  Tobacco History:  reports that she has never smoked. She has never used smokeless tobacco.  Drug History:  reports no history of drug use.    Review of patient's allergies indicates:   Allergen Reactions    Dilaudid [hydromorphone] Nausea And Vomiting    Chlorhexidine Itching    Lortab [hydrocodone-acetaminophen] Itching       Current Outpatient Medications   Medication Sig Dispense Refill    acetaminophen (TYLENOL) 325 MG tablet Take 325 mg by mouth every 6 (six) hours.      ALPRAZolam (XANAX) 0.5 MG tablet Take 1 tablet by mouth.      atorvastatin (LIPITOR) 80 MG tablet Take 1 tablet (80 mg total) by mouth once daily. 90 tablet 3    azelastine (ASTELIN) 137 mcg (0.1 %) nasal spray 1 spray (137 mcg total) by Nasal route 2 (two) times daily. 30 mL 3    blood sugar diagnostic Strp To check BG 4 times daily, to use with insurance preferred meter (Patient taking differently: 1 strip by Misc.(Non-Drug; Combo Route) route 4 (four) times daily. To check BG 4 times daily, to use with insurance preferred meter) 450 strip 3    blood sugar diagnostic Strp To check BG 3 times daily, to use with insurance preferred meter 100 strip 1    blood-glucose meter kit To check BG 3 times daily, to use with insurance preferred meter 1 each 0    cetirizine (ZYRTEC) 10 MG tablet Take 1 tablet (10 mg total) by mouth every other day. 45 tablet 3    clopidogreL (PLAVIX) 75 mg tablet Take 1 tablet (75 mg total) by mouth once daily. 90 tablet 3    doxycycline (MONODOX) 100 MG capsule Take 1 capsule (100 mg total) by mouth 2 (two) times daily. for 14 days 28 capsule 0    epoetin chris-epbx (RETACRIT) 4,000 unit/mL injection Inject 1.11 mLs (4,440 Units total) into the skin every Mon, Wed, Fri.      fluticasone propionate (FLONASE) 50 mcg/actuation nasal spray 2 sprays (100 mcg total) by Each Nostril route once daily. 16 g 3     "gabapentin (NEURONTIN) 100 MG capsule Take 1 capsule (100 mg total) by mouth 2 (two) times daily. 180 capsule 3    hydrALAZINE (APRESOLINE) 25 MG tablet Take 1 tablet (25 mg total) by mouth 2 (two) times daily as needed (Systolic blood pressure > 170 mm Hg).      insulin aspart U-100 (NOVOLOG FLEXPEN U-100 INSULIN) 100 unit/mL (3 mL) InPn pen Inject 5 Units into the skin 3 (three) times daily with meals. 3 mL 6    insulin detemir U-100 (LEVEMIR) 100 unit/mL injection Inject 3 Units into the skin every evening. 2.7 mL 0    lancets Misc To check BG 3 times daily, to use with insurance preferred meter 100 each 1    minoxidiL (LONITEN) 10 MG Tab Take 10 mg by mouth 2 (two) times daily.      multivitamin (THERAGRAN) per tablet Take 1 tablet by mouth.      pen needle, diabetic (BD ULTRA-FINE ROBERT PEN NEEDLE) 32 gauge x 5/32" Ndle 1 pen by Misc.(Non-Drug; Combo Route) route 4 (four) times daily. To use 4 times per day with insulin injections. 100 each 1     No current facility-administered medications for this visit.       Review of Systems   Constitutional:  Negative for chills, fatigue, fever and unexpected weight change.   HENT:  Negative for hearing loss and trouble swallowing.    Eyes:  Negative for photophobia and visual disturbance.   Respiratory:  Negative for cough, shortness of breath and wheezing.    Cardiovascular:  Negative for chest pain, palpitations and leg swelling.   Gastrointestinal:  Negative for abdominal pain and nausea.   Genitourinary:  Negative for dysuria and frequency.   Musculoskeletal:  Negative for arthralgias, back pain, gait problem, joint swelling and myalgias.   Skin:  Positive for wound. Negative for rash.   Neurological:  Negative for tremors, seizures, weakness, numbness and headaches.   Hematological:  Does not bruise/bleed easily.       Objective:        Physical Exam:   Foot Exam  Physical Exam  Ortho/SPM Exam     Imaging:            Assessment:       1. Diabetic ulcer of left " midfoot associated with type 2 diabetes mellitus, with necrosis of muscle    2. Ulcer of left foot with fat layer exposed    3. Type 2 diabetes mellitus with hypoglycemia and coma, with long-term current use of insulin    4. Peripheral vascular disease    5. At high risk for inadequate nutritional intake    6. History of TIA (transient ischemic attack)    7. 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 Type 2 diabetes mellitus with chronic kidney disease on chronic dialysis, with long-term current use of insulin    9. Difficulty in walking, not elsewhere classified    10. Ulcer of right foot, unspecified ulcer stage    11. Diabetic foot infection    12. ESRD (end stage renal disease)    13. Heel ulcer, left, with necrosis of muscle    14. At risk for readmission to hospital    15. Decreased pedal pulses    16. Ulcer of left foot with necrosis of muscle    17. History of amputation of left foot      Plan:   Diabetic ulcer of left midfoot associated with type 2 diabetes mellitus, with necrosis of muscle  -     doxycycline (MONODOX) 100 MG capsule; Take 1 capsule (100 mg total) by mouth 2 (two) times daily. for 14 days  Dispense: 28 capsule; Refill: 0    Ulcer of left foot with fat layer exposed  -     doxycycline (MONODOX) 100 MG capsule; Take 1 capsule (100 mg total) by mouth 2 (two) times daily. for 14 days  Dispense: 28 capsule; Refill: 0    Type 2 diabetes mellitus with hypoglycemia and coma, with long-term current use of insulin    Peripheral vascular disease    At high risk for inadequate nutritional intake    History of TIA (transient ischemic attack)    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    Type 2 diabetes mellitus with chronic kidney disease on chronic dialysis, with long-term current use of insulin    Difficulty in walking, not elsewhere classified    Ulcer of right foot, unspecified ulcer stage    Diabetic foot infection    ESRD (end stage renal disease)    Heel ulcer,  left, with necrosis of muscle    At risk for readmission to hospital    Decreased pedal pulses    Ulcer of left foot with necrosis of muscle    History of amputation of left foot    Follow up in about 6 days (around 11/21/2022).    Procedures          Counseling:     I provided patient education verbally regarding:   Patient diagnosis, treatment options, as well as alternatives, risks, and benefits.     This note was created using Dragon voice recognition software that occasionally misinterpreted phrases or words.

## 2022-11-21 ENCOUNTER — PATIENT MESSAGE (OUTPATIENT)
Dept: ADMINISTRATIVE | Facility: HOSPITAL | Age: 57
End: 2022-11-21
Payer: MEDICARE

## 2022-11-21 ENCOUNTER — OFFICE VISIT (OUTPATIENT)
Dept: WOUND CARE | Facility: HOSPITAL | Age: 57
End: 2022-11-21
Attending: PODIATRIST
Payer: MEDICARE

## 2022-11-21 ENCOUNTER — DOCUMENT SCAN (OUTPATIENT)
Dept: HOME HEALTH SERVICES | Facility: HOSPITAL | Age: 57
End: 2022-11-21
Payer: MEDICARE

## 2022-11-21 VITALS
HEART RATE: 77 BPM | DIASTOLIC BLOOD PRESSURE: 93 MMHG | SYSTOLIC BLOOD PRESSURE: 183 MMHG | RESPIRATION RATE: 18 BRPM | TEMPERATURE: 98 F

## 2022-11-21 DIAGNOSIS — E11.628 DIABETIC FOOT INFECTION: ICD-10-CM

## 2022-11-21 DIAGNOSIS — E11.621 DIABETIC ULCER OF LEFT MIDFOOT ASSOCIATED WITH TYPE 2 DIABETES MELLITUS, WITH NECROSIS OF MUSCLE: Primary | ICD-10-CM

## 2022-11-21 DIAGNOSIS — Z99.2 TYPE 2 DIABETES MELLITUS WITH CHRONIC KIDNEY DISEASE ON CHRONIC DIALYSIS, WITH LONG-TERM CURRENT USE OF INSULIN: ICD-10-CM

## 2022-11-21 DIAGNOSIS — R60.0 BILATERAL LOWER EXTREMITY EDEMA: ICD-10-CM

## 2022-11-21 DIAGNOSIS — Z89.432 HISTORY OF AMPUTATION OF LEFT FOOT: ICD-10-CM

## 2022-11-21 DIAGNOSIS — Z91.89 AT HIGH RISK FOR INADEQUATE NUTRITIONAL INTAKE: ICD-10-CM

## 2022-11-21 DIAGNOSIS — L08.9 DIABETIC FOOT INFECTION: ICD-10-CM

## 2022-11-21 DIAGNOSIS — Z91.89 AT RISK FOR READMISSION TO HOSPITAL: ICD-10-CM

## 2022-11-21 DIAGNOSIS — E11.22 TYPE 2 DIABETES MELLITUS WITH CHRONIC KIDNEY DISEASE ON CHRONIC DIALYSIS, WITH LONG-TERM CURRENT USE OF INSULIN: ICD-10-CM

## 2022-11-21 DIAGNOSIS — T14.8XXA ABRASION: ICD-10-CM

## 2022-11-21 DIAGNOSIS — R09.89 DECREASED PEDAL PULSES: ICD-10-CM

## 2022-11-21 DIAGNOSIS — E11.649 TYPE 2 DIABETES MELLITUS WITH HYPOGLYCEMIA UNAWARENESS: ICD-10-CM

## 2022-11-21 DIAGNOSIS — L97.519 ULCER OF RIGHT FOOT, UNSPECIFIED ULCER STAGE: ICD-10-CM

## 2022-11-21 DIAGNOSIS — Z79.4 TYPE 2 DIABETES MELLITUS WITH HYPOGLYCEMIA AND COMA, WITH LONG-TERM CURRENT USE OF INSULIN: ICD-10-CM

## 2022-11-21 DIAGNOSIS — L97.522 ULCER OF LEFT FOOT WITH FAT LAYER EXPOSED: ICD-10-CM

## 2022-11-21 DIAGNOSIS — N18.6 TYPE 2 DIABETES MELLITUS WITH CHRONIC KIDNEY DISEASE ON CHRONIC DIALYSIS, WITH LONG-TERM CURRENT USE OF INSULIN: ICD-10-CM

## 2022-11-21 DIAGNOSIS — L97.423 DIABETIC ULCER OF LEFT MIDFOOT ASSOCIATED WITH TYPE 2 DIABETES MELLITUS, WITH NECROSIS OF MUSCLE: Primary | ICD-10-CM

## 2022-11-21 DIAGNOSIS — S90.812A ABRASION OF LEFT FOOT, INITIAL ENCOUNTER: ICD-10-CM

## 2022-11-21 DIAGNOSIS — I73.9 PERIPHERAL VASCULAR DISEASE: ICD-10-CM

## 2022-11-21 DIAGNOSIS — Z79.4 TYPE 2 DIABETES MELLITUS WITH CHRONIC KIDNEY DISEASE ON CHRONIC DIALYSIS, WITH LONG-TERM CURRENT USE OF INSULIN: ICD-10-CM

## 2022-11-21 DIAGNOSIS — R26.2 DIFFICULTY IN WALKING, NOT ELSEWHERE CLASSIFIED: ICD-10-CM

## 2022-11-21 DIAGNOSIS — L97.523 ULCER OF LEFT FOOT WITH NECROSIS OF MUSCLE: ICD-10-CM

## 2022-11-21 DIAGNOSIS — E11.641 TYPE 2 DIABETES MELLITUS WITH HYPOGLYCEMIA AND COMA, WITH LONG-TERM CURRENT USE OF INSULIN: ICD-10-CM

## 2022-11-21 DIAGNOSIS — N18.6 ESRD (END STAGE RENAL DISEASE): ICD-10-CM

## 2022-11-21 DIAGNOSIS — L97.423 HEEL ULCER, LEFT, WITH NECROSIS OF MUSCLE: ICD-10-CM

## 2022-11-21 PROCEDURE — 4010F ACE/ARB THERAPY RXD/TAKEN: CPT | Mod: CPTII,,, | Performed by: PODIATRIST

## 2022-11-21 PROCEDURE — 99214 OFFICE O/P EST MOD 30 MIN: CPT | Mod: 25,,, | Performed by: PODIATRIST

## 2022-11-21 PROCEDURE — 3077F SYST BP >= 140 MM HG: CPT | Mod: CPTII,,, | Performed by: PODIATRIST

## 2022-11-21 PROCEDURE — 3077F PR MOST RECENT SYSTOLIC BLOOD PRESSURE >= 140 MM HG: ICD-10-PCS | Mod: CPTII,,, | Performed by: PODIATRIST

## 2022-11-21 PROCEDURE — 1159F MED LIST DOCD IN RCRD: CPT | Mod: CPTII,,, | Performed by: PODIATRIST

## 2022-11-21 PROCEDURE — 3066F NEPHROPATHY DOC TX: CPT | Mod: CPTII,,, | Performed by: PODIATRIST

## 2022-11-21 PROCEDURE — 1160F RVW MEDS BY RX/DR IN RCRD: CPT | Mod: CPTII,,, | Performed by: PODIATRIST

## 2022-11-21 PROCEDURE — 3066F PR DOCUMENTATION OF TREATMENT FOR NEPHROPATHY: ICD-10-PCS | Mod: CPTII,,, | Performed by: PODIATRIST

## 2022-11-21 PROCEDURE — 3080F DIAST BP >= 90 MM HG: CPT | Mod: CPTII,,, | Performed by: PODIATRIST

## 2022-11-21 PROCEDURE — 4010F PR ACE/ARB THEARPY RXD/TAKEN: ICD-10-PCS | Mod: CPTII,,, | Performed by: PODIATRIST

## 2022-11-21 PROCEDURE — 3051F HG A1C>EQUAL 7.0%<8.0%: CPT | Mod: CPTII,,, | Performed by: PODIATRIST

## 2022-11-21 PROCEDURE — 11043 DBRDMT MUSC&/FSCA 1ST 20/<: CPT | Mod: 79,,, | Performed by: PODIATRIST

## 2022-11-21 PROCEDURE — 11042 DBRDMT SUBQ TIS 1ST 20SQCM/<: CPT | Mod: 59 | Performed by: PODIATRIST

## 2022-11-21 PROCEDURE — 11043 DBRDMT MUSC&/FSCA 1ST 20/<: CPT | Performed by: PODIATRIST

## 2022-11-21 PROCEDURE — 99214 PR OFFICE/OUTPT VISIT, EST, LEVL IV, 30-39 MIN: ICD-10-PCS | Mod: 25,,, | Performed by: PODIATRIST

## 2022-11-21 PROCEDURE — 3051F PR MOST RECENT HEMOGLOBIN A1C LEVEL 7.0 - < 8.0%: ICD-10-PCS | Mod: CPTII,,, | Performed by: PODIATRIST

## 2022-11-21 PROCEDURE — 11042 PR DEBRIDEMENT, SKIN, SUB-Q TISSUE,=<20 SQ CM: ICD-10-PCS | Mod: 59,79,, | Performed by: PODIATRIST

## 2022-11-21 PROCEDURE — 11042 DBRDMT SUBQ TIS 1ST 20SQCM/<: CPT | Mod: 59,79,, | Performed by: PODIATRIST

## 2022-11-21 PROCEDURE — 11043 PR DEBRIDEMENT, SKIN, SUB-Q TISSUE,MUSCLE,=<20 SQ CM: ICD-10-PCS | Mod: 79,,, | Performed by: PODIATRIST

## 2022-11-21 PROCEDURE — 3080F PR MOST RECENT DIASTOLIC BLOOD PRESSURE >= 90 MM HG: ICD-10-PCS | Mod: CPTII,,, | Performed by: PODIATRIST

## 2022-11-21 PROCEDURE — 1160F PR REVIEW ALL MEDS BY PRESCRIBER/CLIN PHARMACIST DOCUMENTED: ICD-10-PCS | Mod: CPTII,,, | Performed by: PODIATRIST

## 2022-11-21 PROCEDURE — 1159F PR MEDICATION LIST DOCUMENTED IN MEDICAL RECORD: ICD-10-PCS | Mod: CPTII,,, | Performed by: PODIATRIST

## 2022-11-21 NOTE — PROGRESS NOTES
[address] [facility] [address]  [place] [address]  Phone: [phone]   [phone]    Patient's PCP:Stefano oLpez MD  Referring Provider: Aaareferral Self    Subjective:      Chief Complaint:: Wound Care, Pressure Ulcer, Non-healing Wound, Diabetic Foot Ulcer, Diabetes, Wound Check, and Abrasion (Left foot, new)    HPI      Patient presents for follow-up of left plantar foot wound and left heel wound.       Additionally, patient presents with a new wound located on her left foot, sub 1st metatarsal head.  Please see Wound docs for full assessment evaluation of new wound.     Please see Wound docs for full assessment and evaluation of all wounds.     Patient was recently hospitalized and had vascular intervention performed.  Please see below for discharge summary.        1. Debridement of left heel wound and left plantar foot wound.  Evaluation and assessment only of new wound located sub 1st metatarsal head left foot. See Wound Docs for assessment of wounds and procedure notes  2. Continue taking all medications as prescribed  3. Continue home health dressing changes  4. RTC one week   5. Counseled patient on increasing protein intake, not getting wound wet, keeping dressing clean dry and intact, following a healthy diet, elevating legs when able, removing pressure from wound     Total time spent for E&M 35  Total time for debridement 35 minutes         NPWT is medically necessary for this patient at this time to acheive acceleration of granulation tissue and wound closure. It is my clinical judgement that this cannot be acheived using topical dressing or medications.      Counseling/Education:  I provided patient education verbally regarding:   The aspects of diabetes and how it pertains to the feet. I explained the importance of proper diabetic foot care and how it is essential for the health of their feet.     I discussed the importance of knowing their Hemoglobin A1c and that the level needs to be  as close to 6 as possible. I discussed the increase complications of high blood sugar including stroke, blindness, heart attack, kidney failure and loss of limb secondary to neuropathy and PVD.      With neuropathy, beware of any breaks in the skin or redness. These areas are not recognized early due to the numbness.     I discussed Diabetes, lower back issues, metabolic disorders, systemic causes, chemotherapy, vitamin deficiency, heavy metal exposure, as some of the causes. I also explained that as much as 40% of the time we can not find a cause. I discussed different treatments available to control the symptoms but which may not cure the problem.         Counseled patient on the aspects of diabetes and how it pertains to the feet.  I explained the importance of proper diabetic foot care and how it is essential for the health of their feet.        Shoe inspection. Patient instructed on proper foot hygeine. We discussed wearing proper shoe gear, daily foot inspections, never walking without protective shoe gear, never putting sharp instruments to feet, routine podiatric nail visits every 2-3 months.       Patient should call the office immediately if any signs of infection, such as fever, chills, sweats, increased redness or pain.     Patient was instructed to call the clinic or go to the emergency department if their symptoms do not improve, worsens, or if new symptoms develop.  Patient was advised that if any increased swelling, pain, or numbness arise to go immediately to the ED. Patient knows to call any time if an emergency arises. Shared decision making occurred and patient verbalized understanding in agreement with this plan.      I counseled the patient on their conditions, their implications and medical management.     >50% of this > 60 minute visit was spent face to face educating/counseling the patient     I spent a total of 60 minutes on the day of the visit.This includes face to face time and non-face  to face time preparing to see the patient (eg, review of tests), obtaining and/or reviewing separately obtained history, documenting clinical information in the electronic or other health record, independently interpreting results and communicating results to the patient/family/caregiver, or care coordinator.        Patient Name: Leanna García  MRN: [id number]  Patient Class: IP- Inpatient  Admission Date: 10/17/2022  Hospital Length of Stay: 2 days  Discharge Date and Time: 10/20/2022 11:05 AM  Attending Physician: No att. providers found   Discharging Provider: Pietro Shore MD  Primary Care Provider: Stefano Lopez MD        HPI:   57-year-old female with history of DM 2; ESRD on RRT; PAF, HLD, CAD, HTN, CVA, Non-healing DM foot ulcer was sent to ED for admission for angiography of lower extremities with possible surgical intervention for non-healing ulcer. She was recently in hospital for same. She has had osteomyelitis (bone biopsy grew Proteus) with eventual amputation of the 1st toe and partial 1st metatarsal head amputation on 8/26. This was followed by 2 weeks of cefepime as outpatient. Patient was discharged on clopidogrel, but reportedly did not start until a fortnight or so post discharge when she saw Podiatry for follow-up. She has since begun antiplatelet therapy. However, wound is not healing. Is for angiogram in AM with possible intervention pending angiography results. Denied chest discomfort. No SOB.      In ED: Labs reviewed: no leukocytosis with minimal normocytic anemia; trivial hyponatremia with end stage renal dysfunction. COVID negative. CXR reviewed: NAPD. EKG reviewed: sinus, left axis, no acute segments.     Discussed with ED MD: Inpatient admission to med/tele; continue home regimen for DM, ESRD, HTN, HLD and PAF; Nephrology for RRT; Vascular consult; Podiatry consult; Wound care consult; Insulin sliding scale        Procedure(s) (LRB):  Angiogram Extremity Unilateral (Left)        Hospital Course:   57-year-old with chronic left foot wound referred to emergency room by podiatrist and vascular surgery due to severe peripheral vascular disease and worsening left foot wound.  Unfortunately patient was not taking any antiplatelets, she was evaluated by Infectious Disease, Podiatry and vascular surgery, she underwent single-vessel runoff and was found to have acceptable circulation.  She was cleared for discharge by multiple consultants, Infectious Disease recommended 14 more days of doxycycline.  She already has wound VAC on left foot.  Home health services were resumed.  Surprisingly patient was not requiring much insulin or antihypertensives.  We changed her insulin regimen as well as antihypertensive regimen and educated patient and .  She was advised to follow-up with her PCP and other outpatient providers.     I have seen the patient on the day of discharge and reviewed the discharge instructions as outlined below. Patient verbalized understanding and is aware to contact primary care physician or return to ED if new or worsening symptoms.         Goals of Care Treatment Preferences:  Code Status: Full Code           Much of the documentation for this visit was completed in the Wound Docs system.  Please see the attached documentation for further details about the patient's care. Scanned under the Media tab.         Alivia Bruno DPM     Vitals:    11/21/22 1251   BP: (!) 183/93   Pulse: 77   Resp: 18   Temp: 98.2 °F (36.8 °C)   TempSrc: Skin   PainSc: 0-No pain      Shoe Size:     Past Surgical History:   Procedure Laterality Date    ANGIOGRAPHY OF LOWER EXTREMITY Left 10/18/2022    Procedure: Angiogram Extremity Unilateral;  Surgeon: Ali Khoobehi, MD;  Location: [facility] CATH/EP LAB;  Service: Vascular;  Laterality: Left;    AV FISTULA PLACEMENT Left 8/29/2022    Procedure: CREATION, AV FISTULA;  Surgeon: Ali Khoobehi, MD;  Location: [facility] OR;  Service: Vascular;  Laterality: Left;     BONE BIOPSY Left 8/24/2022    Procedure: Biopsy-Bone;  Surgeon: Porfirio Ponce DPM;  Location: [facility] OR;  Service: Podiatry;  Laterality: Left;    COLONOSCOPY N/A 10/5/2016    Procedure: COLONOSCOPY;  Surgeon: Pillo Chanel MD;  Location: [facility] ENDO;  Service: Endoscopy;  Laterality: N/A;    COLONOSCOPY N/A 4/26/2022    Procedure: COLONOSCOPY;  Surgeon: Saravanan Rachel MD;  Location: [facility] ENDO;  Service: Endoscopy;  Laterality: N/A;    FISTULOGRAM Left 8/24/2022    Procedure: Fistulogram;  Surgeon: Ali Khoobehi, MD;  Location: [facility] CATH/EP LAB;  Service: Vascular;  Laterality: Left;    HERNIA REPAIR Bilateral 11/22/2016    inguinal    HYSTERECTOMY      INCISION AND DRAINAGE FOOT Left 5/13/2022    Procedure: INCISION AND DRAINAGE, FOOT;  Surgeon: Ricardo Bruno DPM;  Location: [facility] OR;  Service: Podiatry;  Laterality: Left;    OOPHORECTOMY      THROMBECTOMY, AV FISTULA, UPPER EXTREMITY, PERCUTANEOUS  8/24/2022    Procedure: THROMBECTOMY, AV FISTULA, UPPER EXTREMITY, PERCUTANEOUS;  Surgeon: Ali Khoobehi, MD;  Location: [facility] CATH/EP LAB;  Service: Vascular;;    TOE AMPUTATION Left 8/26/2022    Procedure: AMPUTATION, TOE;  Surgeon: Porfirio Ponce DPM;  Location: [facility] OR;  Service: Podiatry;  Laterality: Left;     Past Medical History:   Diagnosis Date    A-fib     resolved per patient    Anemia due to end stage renal disease 6/30/2022    Arthritis     Bronchitis     Cardiovascular event risk, ASCVD 10-year risk 6.7% 10/15/2016    Diabetes mellitus type II     Diabetic foot infection     Diabetic ulcer of left midfoot 05/05/2022    Disorder of kidney and ureter     Encounter for blood transfusion     ESRD (end stage renal disease) 05/18/2018    MANJINDER (generalized anxiety disorder) 10/25/2016    History of colon polyps 11/02/2016    Hyperlipidemia     Hypertension     Stroke      Family History   Problem Relation Age of Onset    Hyperlipidemia Mother     Hypertension Mother     Hypertension Father     Diabetes  Father     Diabetes Sister     Diabetes Sister     Diabetes Maternal Aunt     Diabetes Maternal Grandmother     Heart disease Maternal Grandmother     Cancer Paternal Grandmother     Breast cancer Neg Hx     Colon cancer Neg Hx     Ovarian cancer Neg Hx         Social History:   Marital Status:   Alcohol History:  reports no history of alcohol use.  Tobacco History:  reports that she has never smoked. She has never used smokeless tobacco.  Drug History:  reports no history of drug use.    Review of patient's allergies indicates:   Allergen Reactions    Dilaudid [hydromorphone] Nausea And Vomiting    Chlorhexidine Itching    Lortab [hydrocodone-acetaminophen] Itching       Current Outpatient Medications   Medication Sig Dispense Refill    acetaminophen (TYLENOL) 325 MG tablet Take 325 mg by mouth every 6 (six) hours.      ALPRAZolam (XANAX) 0.5 MG tablet Take 1 tablet by mouth.      atorvastatin (LIPITOR) 80 MG tablet Take 1 tablet (80 mg total) by mouth once daily. 90 tablet 3    azelastine (ASTELIN) 137 mcg (0.1 %) nasal spray 1 spray (137 mcg total) by Nasal route 2 (two) times daily. 30 mL 3    blood sugar diagnostic Strp To check BG 4 times daily, to use with insurance preferred meter (Patient taking differently: 1 strip by Misc.(Non-Drug; Combo Route) route 4 (four) times daily. To check BG 4 times daily, to use with insurance preferred meter) 450 strip 3    blood sugar diagnostic Strp To check BG 3 times daily, to use with insurance preferred meter 100 strip 1    blood-glucose meter kit To check BG 3 times daily, to use with insurance preferred meter 1 each 0    cetirizine (ZYRTEC) 10 MG tablet Take 1 tablet (10 mg total) by mouth every other day. 45 tablet 3    clopidogreL (PLAVIX) 75 mg tablet Take 1 tablet (75 mg total) by mouth once daily. 90 tablet 3    doxycycline (MONODOX) 100 MG capsule Take 1 capsule (100 mg total) by mouth 2 (two) times daily. for 14 days 28 capsule 0    epoetin chris-epbx  "(RETACRIT) 4,000 unit/mL injection Inject 1.11 mLs (4,440 Units total) into the skin every Mon, Wed, Fri.      fluticasone propionate (FLONASE) 50 mcg/actuation nasal spray 2 sprays (100 mcg total) by Each Nostril route once daily. 16 g 3    gabapentin (NEURONTIN) 100 MG capsule Take 1 capsule (100 mg total) by mouth 2 (two) times daily. 180 capsule 3    hydrALAZINE (APRESOLINE) 25 MG tablet Take 1 tablet (25 mg total) by mouth 2 (two) times daily as needed (Systolic blood pressure > 170 mm Hg).      insulin aspart U-100 (NOVOLOG FLEXPEN U-100 INSULIN) 100 unit/mL (3 mL) InPn pen Inject 5 Units into the skin 3 (three) times daily with meals. 3 mL 6    insulin detemir U-100 (LEVEMIR) 100 unit/mL injection Inject 3 Units into the skin every evening. 2.7 mL 0    lancets Misc To check BG 3 times daily, to use with insurance preferred meter 100 each 1    minoxidiL (LONITEN) 10 MG Tab Take 10 mg by mouth 2 (two) times daily.      multivitamin (THERAGRAN) per tablet Take 1 tablet by mouth.      pen needle, diabetic (BD ULTRA-FINE ROBERT PEN NEEDLE) 32 gauge x 5/32" Ndle 1 pen by Misc.(Non-Drug; Combo Route) route 4 (four) times daily. To use 4 times per day with insulin injections. 100 each 1     No current facility-administered medications for this visit.       Review of Systems   Constitutional:  Negative for chills, fatigue, fever and unexpected weight change.   HENT:  Negative for hearing loss and trouble swallowing.    Eyes:  Negative for photophobia and visual disturbance.   Respiratory:  Negative for cough, shortness of breath and wheezing.    Cardiovascular:  Negative for chest pain, palpitations and leg swelling.   Gastrointestinal:  Negative for abdominal pain and nausea.   Genitourinary:  Negative for dysuria and frequency.   Musculoskeletal:  Negative for arthralgias, back pain, gait problem, joint swelling and myalgias.   Skin:  Positive for wound. Negative for rash.   Neurological:  Negative for tremors, " seizures, weakness, numbness and headaches.   Hematological:  Does not bruise/bleed easily.       Objective:        Physical Exam:   Foot Exam  Physical Exam  Ortho/SPM Exam     Imaging:            Assessment:       1. Diabetic ulcer of left midfoot associated with type 2 diabetes mellitus, with necrosis of muscle    2. Ulcer of left foot with fat layer exposed    3. Heel ulcer, left, with necrosis of muscle    4. Ulcer of left foot with necrosis of muscle    5. Type 2 diabetes mellitus with hypoglycemia and coma, with long-term current use of insulin    6. At high risk for inadequate nutritional intake    7. Peripheral vascular disease    8. 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 Type 2 diabetes mellitus with chronic kidney disease on chronic dialysis, with long-term current use of insulin    10. Difficulty in walking, not elsewhere classified    11. ESRD (end stage renal disease)    12. Diabetic foot infection    13. Ulcer of right foot, unspecified ulcer stage    14. At risk for readmission to hospital    15. Decreased pedal pulses    16. History of amputation of left foot    17. Bilateral lower extremity edema    18. Abrasion    19. Abrasion of left foot, initial encounter      Plan:   Diabetic ulcer of left midfoot associated with type 2 diabetes mellitus, with necrosis of muscle    Ulcer of left foot with fat layer exposed    Heel ulcer, left, with necrosis of muscle    Ulcer of left foot with necrosis of muscle    Type 2 diabetes mellitus with hypoglycemia and coma, with long-term current use of insulin    At high risk for inadequate nutritional intake    Peripheral vascular disease    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    Type 2 diabetes mellitus with chronic kidney disease on chronic dialysis, with long-term current use of insulin    Difficulty in walking, not elsewhere classified    ESRD (end stage renal disease)    Diabetic foot infection    Ulcer of right foot,  unspecified ulcer stage    At risk for readmission to hospital    Decreased pedal pulses    History of amputation of left foot    Bilateral lower extremity edema    Abrasion    Abrasion of left foot, initial encounter    Follow up in about 8 days (around 11/29/2022).    Procedures          Counseling:     I provided patient education verbally regarding:   Patient diagnosis, treatment options, as well as alternatives, risks, and benefits.     This note was created using Dragon voice recognition software that occasionally misinterpreted phrases or words.

## 2022-11-29 ENCOUNTER — OFFICE VISIT (OUTPATIENT)
Dept: WOUND CARE | Facility: HOSPITAL | Age: 57
End: 2022-11-29
Attending: PODIATRIST
Payer: MEDICARE

## 2022-11-29 VITALS
RESPIRATION RATE: 20 BRPM | SYSTOLIC BLOOD PRESSURE: 165 MMHG | DIASTOLIC BLOOD PRESSURE: 75 MMHG | HEART RATE: 83 BPM | TEMPERATURE: 98 F

## 2022-11-29 DIAGNOSIS — E11.641 TYPE 2 DIABETES MELLITUS WITH HYPOGLYCEMIA AND COMA, WITH LONG-TERM CURRENT USE OF INSULIN: ICD-10-CM

## 2022-11-29 DIAGNOSIS — N18.6 TYPE 2 DIABETES MELLITUS WITH CHRONIC KIDNEY DISEASE ON CHRONIC DIALYSIS, WITH LONG-TERM CURRENT USE OF INSULIN: ICD-10-CM

## 2022-11-29 DIAGNOSIS — L97.423 HEEL ULCER, LEFT, WITH NECROSIS OF MUSCLE: ICD-10-CM

## 2022-11-29 DIAGNOSIS — Z99.2 TYPE 2 DIABETES MELLITUS WITH CHRONIC KIDNEY DISEASE ON CHRONIC DIALYSIS, WITH LONG-TERM CURRENT USE OF INSULIN: ICD-10-CM

## 2022-11-29 DIAGNOSIS — L08.9 DIABETIC FOOT INFECTION: ICD-10-CM

## 2022-11-29 DIAGNOSIS — L97.522 ULCER OF LEFT FOOT WITH FAT LAYER EXPOSED: ICD-10-CM

## 2022-11-29 DIAGNOSIS — I73.9 PERIPHERAL VASCULAR DISEASE: ICD-10-CM

## 2022-11-29 DIAGNOSIS — R26.2 DIFFICULTY IN WALKING, NOT ELSEWHERE CLASSIFIED: ICD-10-CM

## 2022-11-29 DIAGNOSIS — Z91.89 AT HIGH RISK FOR INADEQUATE NUTRITIONAL INTAKE: ICD-10-CM

## 2022-11-29 DIAGNOSIS — E11.59 HYPERTENSION ASSOCIATED WITH DIABETES: ICD-10-CM

## 2022-11-29 DIAGNOSIS — Z89.432 HISTORY OF AMPUTATION OF LEFT FOOT: ICD-10-CM

## 2022-11-29 DIAGNOSIS — Z79.4 TYPE 2 DIABETES MELLITUS WITH CHRONIC KIDNEY DISEASE ON CHRONIC DIALYSIS, WITH LONG-TERM CURRENT USE OF INSULIN: ICD-10-CM

## 2022-11-29 DIAGNOSIS — E11.649 TYPE 2 DIABETES MELLITUS WITH HYPOGLYCEMIA UNAWARENESS: ICD-10-CM

## 2022-11-29 DIAGNOSIS — R09.89 DECREASED PEDAL PULSES: ICD-10-CM

## 2022-11-29 DIAGNOSIS — T14.8XXA ABRASION: ICD-10-CM

## 2022-11-29 DIAGNOSIS — L97.423 DIABETIC ULCER OF LEFT MIDFOOT ASSOCIATED WITH TYPE 2 DIABETES MELLITUS, WITH NECROSIS OF MUSCLE: Primary | ICD-10-CM

## 2022-11-29 DIAGNOSIS — R60.0 BILATERAL LOWER EXTREMITY EDEMA: ICD-10-CM

## 2022-11-29 DIAGNOSIS — N18.6 ESRD (END STAGE RENAL DISEASE): ICD-10-CM

## 2022-11-29 DIAGNOSIS — E11.628 DIABETIC FOOT INFECTION: ICD-10-CM

## 2022-11-29 DIAGNOSIS — E11.22 TYPE 2 DIABETES MELLITUS WITH CHRONIC KIDNEY DISEASE ON CHRONIC DIALYSIS, WITH LONG-TERM CURRENT USE OF INSULIN: ICD-10-CM

## 2022-11-29 DIAGNOSIS — Z86.73 HISTORY OF TIA (TRANSIENT ISCHEMIC ATTACK): ICD-10-CM

## 2022-11-29 DIAGNOSIS — L97.523 ULCER OF LEFT FOOT WITH NECROSIS OF MUSCLE: ICD-10-CM

## 2022-11-29 DIAGNOSIS — E11.621 DIABETIC ULCER OF LEFT MIDFOOT ASSOCIATED WITH TYPE 2 DIABETES MELLITUS, WITH NECROSIS OF MUSCLE: Primary | ICD-10-CM

## 2022-11-29 DIAGNOSIS — I15.2 HYPERTENSION ASSOCIATED WITH DIABETES: ICD-10-CM

## 2022-11-29 DIAGNOSIS — Z79.4 TYPE 2 DIABETES MELLITUS WITH HYPOGLYCEMIA AND COMA, WITH LONG-TERM CURRENT USE OF INSULIN: ICD-10-CM

## 2022-11-29 PROCEDURE — 11043 DBRDMT MUSC&/FSCA 1ST 20/<: CPT | Performed by: PODIATRIST

## 2022-11-29 PROCEDURE — 1160F RVW MEDS BY RX/DR IN RCRD: CPT | Mod: CPTII,,, | Performed by: PODIATRIST

## 2022-11-29 PROCEDURE — 3077F PR MOST RECENT SYSTOLIC BLOOD PRESSURE >= 140 MM HG: ICD-10-PCS | Mod: CPTII,,, | Performed by: PODIATRIST

## 2022-11-29 PROCEDURE — 4010F PR ACE/ARB THEARPY RXD/TAKEN: ICD-10-PCS | Mod: CPTII,,, | Performed by: PODIATRIST

## 2022-11-29 PROCEDURE — 3066F NEPHROPATHY DOC TX: CPT | Mod: CPTII,,, | Performed by: PODIATRIST

## 2022-11-29 PROCEDURE — 4010F ACE/ARB THERAPY RXD/TAKEN: CPT | Mod: CPTII,,, | Performed by: PODIATRIST

## 2022-11-29 PROCEDURE — 3078F PR MOST RECENT DIASTOLIC BLOOD PRESSURE < 80 MM HG: ICD-10-PCS | Mod: CPTII,,, | Performed by: PODIATRIST

## 2022-11-29 PROCEDURE — 3066F PR DOCUMENTATION OF TREATMENT FOR NEPHROPATHY: ICD-10-PCS | Mod: CPTII,,, | Performed by: PODIATRIST

## 2022-11-29 PROCEDURE — 99214 PR OFFICE/OUTPT VISIT, EST, LEVL IV, 30-39 MIN: ICD-10-PCS | Mod: 25,,, | Performed by: PODIATRIST

## 2022-11-29 PROCEDURE — 3078F DIAST BP <80 MM HG: CPT | Mod: CPTII,,, | Performed by: PODIATRIST

## 2022-11-29 PROCEDURE — 11043 DBRDMT MUSC&/FSCA 1ST 20/<: CPT | Mod: ,,, | Performed by: PODIATRIST

## 2022-11-29 PROCEDURE — 11042 DBRDMT SUBQ TIS 1ST 20SQCM/<: CPT | Mod: 59,,, | Performed by: PODIATRIST

## 2022-11-29 PROCEDURE — 99214 OFFICE O/P EST MOD 30 MIN: CPT | Mod: 25,,, | Performed by: PODIATRIST

## 2022-11-29 PROCEDURE — 1159F PR MEDICATION LIST DOCUMENTED IN MEDICAL RECORD: ICD-10-PCS | Mod: CPTII,,, | Performed by: PODIATRIST

## 2022-11-29 PROCEDURE — 1160F PR REVIEW ALL MEDS BY PRESCRIBER/CLIN PHARMACIST DOCUMENTED: ICD-10-PCS | Mod: CPTII,,, | Performed by: PODIATRIST

## 2022-11-29 PROCEDURE — 11042 PR DEBRIDEMENT, SKIN, SUB-Q TISSUE,=<20 SQ CM: ICD-10-PCS | Mod: 59,,, | Performed by: PODIATRIST

## 2022-11-29 PROCEDURE — 1159F MED LIST DOCD IN RCRD: CPT | Mod: CPTII,,, | Performed by: PODIATRIST

## 2022-11-29 PROCEDURE — 3051F PR MOST RECENT HEMOGLOBIN A1C LEVEL 7.0 - < 8.0%: ICD-10-PCS | Mod: CPTII,,, | Performed by: PODIATRIST

## 2022-11-29 PROCEDURE — 3077F SYST BP >= 140 MM HG: CPT | Mod: CPTII,,, | Performed by: PODIATRIST

## 2022-11-29 PROCEDURE — 11043 PR DEBRIDEMENT, SKIN, SUB-Q TISSUE,MUSCLE,=<20 SQ CM: ICD-10-PCS | Mod: ,,, | Performed by: PODIATRIST

## 2022-11-29 PROCEDURE — 11042 DBRDMT SUBQ TIS 1ST 20SQCM/<: CPT | Mod: 59 | Performed by: PODIATRIST

## 2022-11-29 PROCEDURE — 3051F HG A1C>EQUAL 7.0%<8.0%: CPT | Mod: CPTII,,, | Performed by: PODIATRIST

## 2022-11-29 NOTE — PROGRESS NOTES
[address] [facility] [address]  [place] [address]  Phone: [phone]   [phone]    Patient's PCP:Stefano Lopez MD  Referring Provider: No ref. provider found    Subjective:      Chief Complaint:: Diabetic Foot Ulcer, Pressure Ulcer, Wound Care, Diabetes, Non-healing Wound, and Wound Check    HPI     Patient presents for follow-up of left plantar foot wound,  left heel wound, and left foot, sub 1st metatarsal head wound.        Additionally, patient presents with a new wound located on her .  Please see Wound docs for full assessment evaluation of new wound.     Please see Wound docs for full assessment and evaluation of all wounds.     Patient was recently hospitalized and had vascular intervention performed.  Please see below for discharge summary.        1. Debridement of left heel wound and left plantar foot wound.  Evaluation and assessment only of wound located sub 1st metatarsal head left foot. See Wound Docs for assessment of wounds and procedure notes  2. Continue taking all medications as prescribed  3. Continue home health dressing changes  4. RTC one week   5. Counseled patient on increasing protein intake, not getting wound wet, keeping dressing clean dry and intact, following a healthy diet, elevating legs when able, removing pressure from wound     Total time spent for E&M 35  Total time for debridement 35 minutes         NPWT is medically necessary for this patient at this time to acheive acceleration of granulation tissue and wound closure. It is my clinical judgement that this cannot be acheived using topical dressing or medications.      Counseling/Education:  I provided patient education verbally regarding:   The aspects of diabetes and how it pertains to the feet. I explained the importance of proper diabetic foot care and how it is essential for the health of their feet.     I discussed the importance of knowing their Hemoglobin A1c and that the level needs to be as close to 6 as  possible. I discussed the increase complications of high blood sugar including stroke, blindness, heart attack, kidney failure and loss of limb secondary to neuropathy and PVD.      With neuropathy, beware of any breaks in the skin or redness. These areas are not recognized early due to the numbness.     I discussed Diabetes, lower back issues, metabolic disorders, systemic causes, chemotherapy, vitamin deficiency, heavy metal exposure, as some of the causes. I also explained that as much as 40% of the time we can not find a cause. I discussed different treatments available to control the symptoms but which may not cure the problem.         Counseled patient on the aspects of diabetes and how it pertains to the feet.  I explained the importance of proper diabetic foot care and how it is essential for the health of their feet.        Shoe inspection. Patient instructed on proper foot hygeine. We discussed wearing proper shoe gear, daily foot inspections, never walking without protective shoe gear, never putting sharp instruments to feet, routine podiatric nail visits every 2-3 months.       Patient should call the office immediately if any signs of infection, such as fever, chills, sweats, increased redness or pain.     Patient was instructed to call the clinic or go to the emergency department if their symptoms do not improve, worsens, or if new symptoms develop.  Patient was advised that if any increased swelling, pain, or numbness arise to go immediately to the ED. Patient knows to call any time if an emergency arises. Shared decision making occurred and patient verbalized understanding in agreement with this plan.      I counseled the patient on their conditions, their implications and medical management.     >50% of this > 60 minute visit was spent face to face educating/counseling the patient     I spent a total of 60 minutes on the day of the visit.This includes face to face time and non-face to face time  preparing to see the patient (eg, review of tests), obtaining and/or reviewing separately obtained history, documenting clinical information in the electronic or other health record, independently interpreting results and communicating results to the patient/family/caregiver, or care coordinator.        Patient Name: Leanna García  MRN: [id number]  Patient Class: IP- Inpatient  Admission Date: 10/17/2022  Hospital Length of Stay: 2 days  Discharge Date and Time: 10/20/2022 11:05 AM  Attending Physician: No att. providers found   Discharging Provider: Pietro Shore MD  Primary Care Provider: Stefano Lopez MD        HPI:   57-year-old female with history of DM 2; ESRD on RRT; PAF, HLD, CAD, HTN, CVA, Non-healing DM foot ulcer was sent to ED for admission for angiography of lower extremities with possible surgical intervention for non-healing ulcer. She was recently in hospital for same. She has had osteomyelitis (bone biopsy grew Proteus) with eventual amputation of the 1st toe and partial 1st metatarsal head amputation on 8/26. This was followed by 2 weeks of cefepime as outpatient. Patient was discharged on clopidogrel, but reportedly did not start until a fortnight or so post discharge when she saw Podiatry for follow-up. She has since begun antiplatelet therapy. However, wound is not healing. Is for angiogram in AM with possible intervention pending angiography results. Denied chest discomfort. No SOB.      In ED: Labs reviewed: no leukocytosis with minimal normocytic anemia; trivial hyponatremia with end stage renal dysfunction. COVID negative. CXR reviewed: NAPD. EKG reviewed: sinus, left axis, no acute segments.     Discussed with ED MD: Inpatient admission to med/tele; continue home regimen for DM, ESRD, HTN, HLD and PAF; Nephrology for RRT; Vascular consult; Podiatry consult; Wound care consult; Insulin sliding scale        Procedure(s) (LRB):  Angiogram Extremity Unilateral (Left)       Hospital  Course:   57-year-old with chronic left foot wound referred to emergency room by podiatrist and vascular surgery due to severe peripheral vascular disease and worsening left foot wound.  Unfortunately patient was not taking any antiplatelets, she was evaluated by Infectious Disease, Podiatry and vascular surgery, she underwent single-vessel runoff and was found to have acceptable circulation.  She was cleared for discharge by multiple consultants, Infectious Disease recommended 14 more days of doxycycline.  She already has wound VAC on left foot.  Home health services were resumed.  Surprisingly patient was not requiring much insulin or antihypertensives.  We changed her insulin regimen as well as antihypertensive regimen and educated patient and .  She was advised to follow-up with her PCP and other outpatient providers.     I have seen the patient on the day of discharge and reviewed the discharge instructions as outlined below. Patient verbalized understanding and is aware to contact primary care physician or return to ED if new or worsening symptoms.         Goals of Care Treatment Preferences:  Code Status: Full Code           Much of the documentation for this visit was completed in the Wound Docs system.  Please see the attached documentation for further details about the patient's care. Scanned under the Media tab.         Alivia Bruno DPM     Vitals:    11/29/22 1205   BP: (!) 165/75   Pulse: 83   Resp: 20   Temp: 98.2 °F (36.8 °C)   TempSrc: Temporal   PainSc: 0-No pain   PainLoc: Foot      Shoe Size:     Past Surgical History:   Procedure Laterality Date    ANGIOGRAPHY OF LOWER EXTREMITY Left 10/18/2022    Procedure: Angiogram Extremity Unilateral;  Surgeon: Ali Khoobehi, MD;  Location: [facility] CATH/EP LAB;  Service: Vascular;  Laterality: Left;    AV FISTULA PLACEMENT Left 8/29/2022    Procedure: CREATION, AV FISTULA;  Surgeon: Ali Khoobehi, MD;  Location: [facility] OR;  Service: Vascular;  Laterality:  Left;    BONE BIOPSY Left 8/24/2022    Procedure: Biopsy-Bone;  Surgeon: Porfirio Ponce DPM;  Location: [facility] OR;  Service: Podiatry;  Laterality: Left;    COLONOSCOPY N/A 10/5/2016    Procedure: COLONOSCOPY;  Surgeon: Pillo Chanel MD;  Location: [facility] ENDO;  Service: Endoscopy;  Laterality: N/A;    COLONOSCOPY N/A 4/26/2022    Procedure: COLONOSCOPY;  Surgeon: Saravanan Rachel MD;  Location: [facility] ENDO;  Service: Endoscopy;  Laterality: N/A;    FISTULOGRAM Left 8/24/2022    Procedure: Fistulogram;  Surgeon: Ali Khoobehi, MD;  Location: [facility] CATH/EP LAB;  Service: Vascular;  Laterality: Left;    HERNIA REPAIR Bilateral 11/22/2016    inguinal    HYSTERECTOMY      INCISION AND DRAINAGE FOOT Left 5/13/2022    Procedure: INCISION AND DRAINAGE, FOOT;  Surgeon: Ricardo Bruno DPM;  Location: [facility] OR;  Service: Podiatry;  Laterality: Left;    OOPHORECTOMY      THROMBECTOMY, AV FISTULA, UPPER EXTREMITY, PERCUTANEOUS  8/24/2022    Procedure: THROMBECTOMY, AV FISTULA, UPPER EXTREMITY, PERCUTANEOUS;  Surgeon: Ali Khoobehi, MD;  Location: [facility] CATH/EP LAB;  Service: Vascular;;    TOE AMPUTATION Left 8/26/2022    Procedure: AMPUTATION, TOE;  Surgeon: Porfirio Ponce DPM;  Location: [facility] OR;  Service: Podiatry;  Laterality: Left;     Past Medical History:   Diagnosis Date    A-fib     resolved per patient    Anemia due to end stage renal disease 6/30/2022    Arthritis     Bronchitis     Cardiovascular event risk, ASCVD 10-year risk 6.7% 10/15/2016    Diabetes mellitus type II     Diabetic foot infection     Diabetic ulcer of left midfoot 05/05/2022    Disorder of kidney and ureter     Encounter for blood transfusion     ESRD (end stage renal disease) 05/18/2018    MANJINDER (generalized anxiety disorder) 10/25/2016    History of colon polyps 11/02/2016    Hyperlipidemia     Hypertension     Stroke      Family History   Problem Relation Age of Onset    Hyperlipidemia Mother     Hypertension Mother     Hypertension Father      Diabetes Father     Diabetes Sister     Diabetes Sister     Diabetes Maternal Aunt     Diabetes Maternal Grandmother     Heart disease Maternal Grandmother     Cancer Paternal Grandmother     Breast cancer Neg Hx     Colon cancer Neg Hx     Ovarian cancer Neg Hx         Social History:   Marital Status:   Alcohol History:  reports no history of alcohol use.  Tobacco History:  reports that she has never smoked. She has never used smokeless tobacco.  Drug History:  reports no history of drug use.    Review of patient's allergies indicates:   Allergen Reactions    Dilaudid [hydromorphone] Nausea And Vomiting    Chlorhexidine Itching    Lortab [hydrocodone-acetaminophen] Itching       Current Outpatient Medications   Medication Sig Dispense Refill    acetaminophen (TYLENOL) 325 MG tablet Take 325 mg by mouth every 6 (six) hours.      ALPRAZolam (XANAX) 0.5 MG tablet Take 1 tablet by mouth.      atorvastatin (LIPITOR) 80 MG tablet Take 1 tablet (80 mg total) by mouth once daily. 90 tablet 3    azelastine (ASTELIN) 137 mcg (0.1 %) nasal spray 1 spray (137 mcg total) by Nasal route 2 (two) times daily. 30 mL 3    blood sugar diagnostic Strp To check BG 4 times daily, to use with insurance preferred meter (Patient taking differently: 1 strip by Misc.(Non-Drug; Combo Route) route 4 (four) times daily. To check BG 4 times daily, to use with insurance preferred meter) 450 strip 3    blood sugar diagnostic Strp To check BG 3 times daily, to use with insurance preferred meter 100 strip 1    blood-glucose meter kit To check BG 3 times daily, to use with insurance preferred meter 1 each 0    cetirizine (ZYRTEC) 10 MG tablet Take 1 tablet (10 mg total) by mouth every other day. 45 tablet 3    clopidogreL (PLAVIX) 75 mg tablet Take 1 tablet (75 mg total) by mouth once daily. 90 tablet 3    doxycycline (MONODOX) 100 MG capsule Take 1 capsule (100 mg total) by mouth 2 (two) times daily. for 14 days 28 capsule 0    epoetin  "chris-epbx (RETACRIT) 4,000 unit/mL injection Inject 1.11 mLs (4,440 Units total) into the skin every Mon, Wed, Fri.      fluticasone propionate (FLONASE) 50 mcg/actuation nasal spray 2 sprays (100 mcg total) by Each Nostril route once daily. 16 g 3    gabapentin (NEURONTIN) 100 MG capsule Take 1 capsule (100 mg total) by mouth 2 (two) times daily. 180 capsule 3    hydrALAZINE (APRESOLINE) 25 MG tablet Take 1 tablet (25 mg total) by mouth 2 (two) times daily as needed (Systolic blood pressure > 170 mm Hg).      insulin aspart U-100 (NOVOLOG FLEXPEN U-100 INSULIN) 100 unit/mL (3 mL) InPn pen Inject 5 Units into the skin 3 (three) times daily with meals. 3 mL 6    insulin detemir U-100 (LEVEMIR) 100 unit/mL injection Inject 3 Units into the skin every evening. 2.7 mL 0    lancets Misc To check BG 3 times daily, to use with insurance preferred meter 100 each 1    minoxidiL (LONITEN) 10 MG Tab Take 10 mg by mouth 2 (two) times daily.      multivitamin (THERAGRAN) per tablet Take 1 tablet by mouth.      pen needle, diabetic (BD ULTRA-FINE ROBERT PEN NEEDLE) 32 gauge x 5/32" Ndle 1 pen by Misc.(Non-Drug; Combo Route) route 4 (four) times daily. To use 4 times per day with insulin injections. 100 each 1     No current facility-administered medications for this visit.       Review of Systems   Constitutional:  Negative for chills, fatigue, fever and unexpected weight change.   HENT:  Negative for hearing loss and trouble swallowing.    Eyes:  Negative for photophobia and visual disturbance.   Respiratory:  Negative for cough, shortness of breath and wheezing.    Cardiovascular:  Negative for chest pain, palpitations and leg swelling.   Gastrointestinal:  Negative for abdominal pain and nausea.   Genitourinary:  Negative for dysuria and frequency.   Musculoskeletal:  Negative for arthralgias, back pain, gait problem, joint swelling and myalgias.   Skin:  Positive for wound. Negative for rash.   Neurological:  Negative for " tremors, seizures, weakness, numbness and headaches.   Hematological:  Does not bruise/bleed easily.       Objective:        Physical Exam:   Foot Exam  Physical Exam  Ortho/SPM Exam     Imaging:            Assessment:       1. Diabetic ulcer of left midfoot associated with type 2 diabetes mellitus, with necrosis of muscle    2. Ulcer of left foot with necrosis of muscle    3. Peripheral vascular disease    4. Ulcer of left foot with fat layer exposed    5. Type 2 diabetes mellitus with hypoglycemia and coma, with long-term current use of insulin    6. 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 Heel ulcer, left, with necrosis of muscle    8. At high risk for inadequate nutritional intake    9. Type 2 diabetes mellitus with chronic kidney disease on chronic dialysis, with long-term current use of insulin    10. Decreased pedal pulses    11. Abrasion    12. Bilateral lower extremity edema    13. ESRD (end stage renal disease)    14. Hypertension associated with diabetes    15. History of amputation of left foot    16. Difficulty in walking, not elsewhere classified    17. History of TIA (transient ischemic attack)    18. Diabetic foot infection      Plan:   Diabetic ulcer of left midfoot associated with type 2 diabetes mellitus, with necrosis of muscle    Ulcer of left foot with necrosis of muscle    Peripheral vascular disease    Ulcer of left foot with fat layer exposed    Type 2 diabetes mellitus with hypoglycemia and coma, with long-term current use of insulin    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    Heel ulcer, left, with necrosis of muscle    At high risk for inadequate nutritional intake    Type 2 diabetes mellitus with chronic kidney disease on chronic dialysis, with long-term current use of insulin    Decreased pedal pulses    Abrasion    Bilateral lower extremity edema    ESRD (end stage renal disease)    Hypertension associated with diabetes    History of amputation of  left foot    Difficulty in walking, not elsewhere classified    History of TIA (transient ischemic attack)    Diabetic foot infection    Follow up in about 1 week (around 12/6/2022).    Procedures          Counseling:     I provided patient education verbally regarding:   Patient diagnosis, treatment options, as well as alternatives, risks, and benefits.     This note was created using Dragon voice recognition software that occasionally misinterpreted phrases or words.

## 2022-12-02 ENCOUNTER — DOCUMENT SCAN (OUTPATIENT)
Dept: HOME HEALTH SERVICES | Facility: HOSPITAL | Age: 57
End: 2022-12-02
Payer: MEDICARE

## 2022-12-06 ENCOUNTER — PATIENT MESSAGE (OUTPATIENT)
Dept: ADMINISTRATIVE | Facility: HOSPITAL | Age: 57
End: 2022-12-06
Payer: MEDICARE

## 2022-12-06 ENCOUNTER — OFFICE VISIT (OUTPATIENT)
Dept: WOUND CARE | Facility: HOSPITAL | Age: 57
End: 2022-12-06
Attending: PODIATRIST
Payer: MEDICARE

## 2022-12-06 VITALS
HEART RATE: 79 BPM | DIASTOLIC BLOOD PRESSURE: 83 MMHG | TEMPERATURE: 98 F | SYSTOLIC BLOOD PRESSURE: 126 MMHG | RESPIRATION RATE: 17 BRPM

## 2022-12-06 DIAGNOSIS — Z99.2 TYPE 2 DIABETES MELLITUS WITH CHRONIC KIDNEY DISEASE ON CHRONIC DIALYSIS, WITH LONG-TERM CURRENT USE OF INSULIN: ICD-10-CM

## 2022-12-06 DIAGNOSIS — Z89.432 HISTORY OF AMPUTATION OF LEFT FOOT: ICD-10-CM

## 2022-12-06 DIAGNOSIS — Z91.89 AT HIGH RISK FOR INADEQUATE NUTRITIONAL INTAKE: ICD-10-CM

## 2022-12-06 DIAGNOSIS — L97.423 DIABETIC ULCER OF LEFT MIDFOOT ASSOCIATED WITH TYPE 2 DIABETES MELLITUS, WITH NECROSIS OF MUSCLE: ICD-10-CM

## 2022-12-06 DIAGNOSIS — L97.519 DIABETIC ULCER OF OTHER PART OF RIGHT FOOT ASSOCIATED WITH DIABETES MELLITUS DUE TO UNDERLYING CONDITION, UNSPECIFIED ULCER STAGE: ICD-10-CM

## 2022-12-06 DIAGNOSIS — R23.4 ESCHAR OF HEEL: ICD-10-CM

## 2022-12-06 DIAGNOSIS — E11.628 DIABETIC FOOT INFECTION: ICD-10-CM

## 2022-12-06 DIAGNOSIS — L97.423 HEEL ULCER, LEFT, WITH NECROSIS OF MUSCLE: ICD-10-CM

## 2022-12-06 DIAGNOSIS — E11.22 TYPE 2 DIABETES MELLITUS WITH CHRONIC KIDNEY DISEASE ON CHRONIC DIALYSIS, WITH LONG-TERM CURRENT USE OF INSULIN: ICD-10-CM

## 2022-12-06 DIAGNOSIS — L97.523 ULCER OF LEFT FOOT WITH NECROSIS OF MUSCLE: Primary | ICD-10-CM

## 2022-12-06 DIAGNOSIS — I73.9 PERIPHERAL VASCULAR DISEASE: ICD-10-CM

## 2022-12-06 DIAGNOSIS — N18.6 TYPE 2 DIABETES MELLITUS WITH CHRONIC KIDNEY DISEASE ON CHRONIC DIALYSIS, WITH LONG-TERM CURRENT USE OF INSULIN: ICD-10-CM

## 2022-12-06 DIAGNOSIS — E11.59 HYPERTENSION ASSOCIATED WITH DIABETES: ICD-10-CM

## 2022-12-06 DIAGNOSIS — Z86.73 HISTORY OF TIA (TRANSIENT ISCHEMIC ATTACK): ICD-10-CM

## 2022-12-06 DIAGNOSIS — T14.8XXA ABRASION: ICD-10-CM

## 2022-12-06 DIAGNOSIS — R26.2 DIFFICULTY IN WALKING, NOT ELSEWHERE CLASSIFIED: ICD-10-CM

## 2022-12-06 DIAGNOSIS — E11.621 DIABETIC ULCER OF LEFT MIDFOOT ASSOCIATED WITH TYPE 2 DIABETES MELLITUS, WITH NECROSIS OF MUSCLE: ICD-10-CM

## 2022-12-06 DIAGNOSIS — I15.2 HYPERTENSION ASSOCIATED WITH DIABETES: ICD-10-CM

## 2022-12-06 DIAGNOSIS — L08.9 DIABETIC FOOT INFECTION: ICD-10-CM

## 2022-12-06 DIAGNOSIS — R09.89 DECREASED PEDAL PULSES: ICD-10-CM

## 2022-12-06 DIAGNOSIS — L97.519 ULCER OF RIGHT FOOT, UNSPECIFIED ULCER STAGE: ICD-10-CM

## 2022-12-06 DIAGNOSIS — L97.522 ULCER OF LEFT FOOT WITH FAT LAYER EXPOSED: ICD-10-CM

## 2022-12-06 DIAGNOSIS — Z91.89 AT RISK FOR READMISSION TO HOSPITAL: ICD-10-CM

## 2022-12-06 DIAGNOSIS — Z79.4 TYPE 2 DIABETES MELLITUS WITH HYPOGLYCEMIA AND COMA, WITH LONG-TERM CURRENT USE OF INSULIN: ICD-10-CM

## 2022-12-06 DIAGNOSIS — E11.649 TYPE 2 DIABETES MELLITUS WITH HYPOGLYCEMIA UNAWARENESS: ICD-10-CM

## 2022-12-06 DIAGNOSIS — Z79.4 TYPE 2 DIABETES MELLITUS WITH CHRONIC KIDNEY DISEASE ON CHRONIC DIALYSIS, WITH LONG-TERM CURRENT USE OF INSULIN: ICD-10-CM

## 2022-12-06 DIAGNOSIS — N18.6 ESRD (END STAGE RENAL DISEASE): ICD-10-CM

## 2022-12-06 DIAGNOSIS — E11.641 TYPE 2 DIABETES MELLITUS WITH HYPOGLYCEMIA AND COMA, WITH LONG-TERM CURRENT USE OF INSULIN: ICD-10-CM

## 2022-12-06 DIAGNOSIS — R60.0 BILATERAL LOWER EXTREMITY EDEMA: ICD-10-CM

## 2022-12-06 DIAGNOSIS — S90.812A ABRASION OF LEFT FOOT, INITIAL ENCOUNTER: ICD-10-CM

## 2022-12-06 DIAGNOSIS — L97.529 ULCER OF TOE OF LEFT FOOT, UNSPECIFIED ULCER STAGE: ICD-10-CM

## 2022-12-06 DIAGNOSIS — E08.621 DIABETIC ULCER OF OTHER PART OF RIGHT FOOT ASSOCIATED WITH DIABETES MELLITUS DUE TO UNDERLYING CONDITION, UNSPECIFIED ULCER STAGE: ICD-10-CM

## 2022-12-06 PROCEDURE — 3066F PR DOCUMENTATION OF TREATMENT FOR NEPHROPATHY: ICD-10-PCS | Mod: CPTII,,, | Performed by: PODIATRIST

## 2022-12-06 PROCEDURE — 3079F DIAST BP 80-89 MM HG: CPT | Mod: CPTII,,, | Performed by: PODIATRIST

## 2022-12-06 PROCEDURE — 4010F PR ACE/ARB THEARPY RXD/TAKEN: ICD-10-PCS | Mod: CPTII,,, | Performed by: PODIATRIST

## 2022-12-06 PROCEDURE — 11043 DBRDMT MUSC&/FSCA 1ST 20/<: CPT | Mod: ,,, | Performed by: PODIATRIST

## 2022-12-06 PROCEDURE — 99214 OFFICE O/P EST MOD 30 MIN: CPT | Mod: 25,,, | Performed by: PODIATRIST

## 2022-12-06 PROCEDURE — 1160F PR REVIEW ALL MEDS BY PRESCRIBER/CLIN PHARMACIST DOCUMENTED: ICD-10-PCS | Mod: CPTII,,, | Performed by: PODIATRIST

## 2022-12-06 PROCEDURE — 1160F RVW MEDS BY RX/DR IN RCRD: CPT | Mod: CPTII,,, | Performed by: PODIATRIST

## 2022-12-06 PROCEDURE — 11042 DBRDMT SUBQ TIS 1ST 20SQCM/<: CPT | Mod: 59 | Performed by: PODIATRIST

## 2022-12-06 PROCEDURE — 11043 PR DEBRIDEMENT, SKIN, SUB-Q TISSUE,MUSCLE,=<20 SQ CM: ICD-10-PCS | Mod: ,,, | Performed by: PODIATRIST

## 2022-12-06 PROCEDURE — 11043 DBRDMT MUSC&/FSCA 1ST 20/<: CPT | Performed by: PODIATRIST

## 2022-12-06 PROCEDURE — 3074F PR MOST RECENT SYSTOLIC BLOOD PRESSURE < 130 MM HG: ICD-10-PCS | Mod: CPTII,,, | Performed by: PODIATRIST

## 2022-12-06 PROCEDURE — 11042 PR DEBRIDEMENT, SKIN, SUB-Q TISSUE,=<20 SQ CM: ICD-10-PCS | Mod: 59,,, | Performed by: PODIATRIST

## 2022-12-06 PROCEDURE — 11042 DBRDMT SUBQ TIS 1ST 20SQCM/<: CPT | Mod: 59,,, | Performed by: PODIATRIST

## 2022-12-06 PROCEDURE — 1159F MED LIST DOCD IN RCRD: CPT | Mod: CPTII,,, | Performed by: PODIATRIST

## 2022-12-06 PROCEDURE — 3074F SYST BP LT 130 MM HG: CPT | Mod: CPTII,,, | Performed by: PODIATRIST

## 2022-12-06 PROCEDURE — 1159F PR MEDICATION LIST DOCUMENTED IN MEDICAL RECORD: ICD-10-PCS | Mod: CPTII,,, | Performed by: PODIATRIST

## 2022-12-06 PROCEDURE — 3079F PR MOST RECENT DIASTOLIC BLOOD PRESSURE 80-89 MM HG: ICD-10-PCS | Mod: CPTII,,, | Performed by: PODIATRIST

## 2022-12-06 PROCEDURE — 99214 PR OFFICE/OUTPT VISIT, EST, LEVL IV, 30-39 MIN: ICD-10-PCS | Mod: 25,,, | Performed by: PODIATRIST

## 2022-12-06 PROCEDURE — 3051F PR MOST RECENT HEMOGLOBIN A1C LEVEL 7.0 - < 8.0%: ICD-10-PCS | Mod: CPTII,,, | Performed by: PODIATRIST

## 2022-12-06 PROCEDURE — 3066F NEPHROPATHY DOC TX: CPT | Mod: CPTII,,, | Performed by: PODIATRIST

## 2022-12-06 PROCEDURE — 3051F HG A1C>EQUAL 7.0%<8.0%: CPT | Mod: CPTII,,, | Performed by: PODIATRIST

## 2022-12-06 PROCEDURE — 4010F ACE/ARB THERAPY RXD/TAKEN: CPT | Mod: CPTII,,, | Performed by: PODIATRIST

## 2022-12-06 NOTE — PROGRESS NOTES
[address] [facility] [address]  [place] [address]  Phone: [phone]   [phone]    Patient's PCP:Stefano Lopez MD  Referring Provider: Aaareferral Self    Subjective:      Chief Complaint:: Wound Care, Non-healing Wound, Pressure Ulcer, Diabetic Foot Ulcer, Diabetes, and Wound Check    HPI     Patient presents for follow-up of left plantar foot wound,  left heel wound, and left sub 1st metatarsal head wound.  Please see Wound docs for full assessment and evaluation of all wounds.     Patient was recently hospitalized and had vascular intervention performed.  Please see below for discharge summary.        1. Debridement of left heel wound and left plantar foot wound.  Evaluation and assessment only of wound located sub 1st metatarsal head left foot. See Wound Docs for assessment of wounds and procedure notes  2. Continue taking all medications as prescribed  3. Continue home health dressing changes  4. RTC one week   5. Counseled patient on increasing protein intake, not getting wound wet, keeping dressing clean dry and intact, following a healthy diet, elevating legs when able, removing pressure from wound     Total time spent for E&M 35  Total time for debridement 35 minutes         NPWT is medically necessary for this patient at this time to acheive acceleration of granulation tissue and wound closure. It is my clinical judgement that this cannot be acheived using topical dressing or medications.      Counseling/Education:  I provided patient education verbally regarding:   The aspects of diabetes and how it pertains to the feet. I explained the importance of proper diabetic foot care and how it is essential for the health of their feet.     I discussed the importance of knowing their Hemoglobin A1c and that the level needs to be as close to 6 as possible. I discussed the increase complications of high blood sugar including stroke, blindness, heart attack, kidney failure and loss of limb secondary to  neuropathy and PVD.      With neuropathy, beware of any breaks in the skin or redness. These areas are not recognized early due to the numbness.     I discussed Diabetes, lower back issues, metabolic disorders, systemic causes, chemotherapy, vitamin deficiency, heavy metal exposure, as some of the causes. I also explained that as much as 40% of the time we can not find a cause. I discussed different treatments available to control the symptoms but which may not cure the problem.         Counseled patient on the aspects of diabetes and how it pertains to the feet.  I explained the importance of proper diabetic foot care and how it is essential for the health of their feet.        Shoe inspection. Patient instructed on proper foot hygeine. We discussed wearing proper shoe gear, daily foot inspections, never walking without protective shoe gear, never putting sharp instruments to feet, routine podiatric nail visits every 2-3 months.       Patient should call the office immediately if any signs of infection, such as fever, chills, sweats, increased redness or pain.     Patient was instructed to call the clinic or go to the emergency department if their symptoms do not improve, worsens, or if new symptoms develop.  Patient was advised that if any increased swelling, pain, or numbness arise to go immediately to the ED. Patient knows to call any time if an emergency arises. Shared decision making occurred and patient verbalized understanding in agreement with this plan.      I counseled the patient on their conditions, their implications and medical management.     >50% of this > 60 minute visit was spent face to face educating/counseling the patient     I spent a total of 60 minutes on the day of the visit.This includes face to face time and non-face to face time preparing to see the patient (eg, review of tests), obtaining and/or reviewing separately obtained history, documenting clinical information in the electronic or  other health record, independently interpreting results and communicating results to the patient/family/caregiver, or care coordinator.        Patient Name: Leanna García  MRN: [id number]  Patient Class: IP- Inpatient  Admission Date: 10/17/2022  Hospital Length of Stay: 2 days  Discharge Date and Time: 10/20/2022 11:05 AM  Attending Physician: Precious att. providers found   Discharging Provider: Pietro Shore MD  Primary Care Provider: Stefano Lopez MD        HPI:   57-year-old female with history of DM 2; ESRD on RRT; PAF, HLD, CAD, HTN, CVA, Non-healing DM foot ulcer was sent to ED for admission for angiography of lower extremities with possible surgical intervention for non-healing ulcer. She was recently in hospital for same. She has had osteomyelitis (bone biopsy grew Proteus) with eventual amputation of the 1st toe and partial 1st metatarsal head amputation on 8/26. This was followed by 2 weeks of cefepime as outpatient. Patient was discharged on clopidogrel, but reportedly did not start until a fortnight or so post discharge when she saw Podiatry for follow-up. She has since begun antiplatelet therapy. However, wound is not healing. Is for angiogram in AM with possible intervention pending angiography results. Denied chest discomfort. No SOB.      In ED: Labs reviewed: no leukocytosis with minimal normocytic anemia; trivial hyponatremia with end stage renal dysfunction. COVID negative. CXR reviewed: NAPD. EKG reviewed: sinus, left axis, no acute segments.     Discussed with ED MD: Inpatient admission to med/tele; continue home regimen for DM, ESRD, HTN, HLD and PAF; Nephrology for RRT; Vascular consult; Podiatry consult; Wound care consult; Insulin sliding scale        Procedure(s) (LRB):  Angiogram Extremity Unilateral (Left)       Hospital Course:   57-year-old with chronic left foot wound referred to emergency room by podiatrist and vascular surgery due to severe peripheral vascular disease and  worsening left foot wound.  Unfortunately patient was not taking any antiplatelets, she was evaluated by Infectious Disease, Podiatry and vascular surgery, she underwent single-vessel runoff and was found to have acceptable circulation.  She was cleared for discharge by multiple consultants, Infectious Disease recommended 14 more days of doxycycline.  She already has wound VAC on left foot.  Home health services were resumed.  Surprisingly patient was not requiring much insulin or antihypertensives.  We changed her insulin regimen as well as antihypertensive regimen and educated patient and .  She was advised to follow-up with her PCP and other outpatient providers.     I have seen the patient on the day of discharge and reviewed the discharge instructions as outlined below. Patient verbalized understanding and is aware to contact primary care physician or return to ED if new or worsening symptoms.         Goals of Care Treatment Preferences:  Code Status: Full Code           Much of the documentation for this visit was completed in the Wound Docs system.  Please see the attached documentation for further details about the patient's care. Scanned under the Media tab.         Alivia Bruno DPM     Vitals:    12/06/22 1218   BP: 126/83   Pulse: 79   Resp: 17   Temp: 98.2 °F (36.8 °C)   PainSc: 0-No pain      Shoe Size:     Past Surgical History:   Procedure Laterality Date    ANGIOGRAPHY OF LOWER EXTREMITY Left 10/18/2022    Procedure: Angiogram Extremity Unilateral;  Surgeon: Ali Khoobehi, MD;  Location: [facility] CATH/EP LAB;  Service: Vascular;  Laterality: Left;    AV FISTULA PLACEMENT Left 8/29/2022    Procedure: CREATION, AV FISTULA;  Surgeon: Ali Khoobehi, MD;  Location: [facility] OR;  Service: Vascular;  Laterality: Left;    BONE BIOPSY Left 8/24/2022    Procedure: Biopsy-Bone;  Surgeon: Porfirio Ponce DPM;  Location: [facility] OR;  Service: Podiatry;  Laterality: Left;    COLONOSCOPY N/A 10/5/2016    Procedure:  COLONOSCOPY;  Surgeon: Pillo Chanel MD;  Location: [facility] ENDO;  Service: Endoscopy;  Laterality: N/A;    COLONOSCOPY N/A 4/26/2022    Procedure: COLONOSCOPY;  Surgeon: Saravanan Rachel MD;  Location: [facility] ENDO;  Service: Endoscopy;  Laterality: N/A;    FISTULOGRAM Left 8/24/2022    Procedure: Fistulogram;  Surgeon: Ali Khoobehi, MD;  Location: [facility] CATH/EP LAB;  Service: Vascular;  Laterality: Left;    HERNIA REPAIR Bilateral 11/22/2016    inguinal    HYSTERECTOMY      INCISION AND DRAINAGE FOOT Left 5/13/2022    Procedure: INCISION AND DRAINAGE, FOOT;  Surgeon: Ricardo Bruno DPM;  Location: [facility] OR;  Service: Podiatry;  Laterality: Left;    OOPHORECTOMY      THROMBECTOMY, AV FISTULA, UPPER EXTREMITY, PERCUTANEOUS  8/24/2022    Procedure: THROMBECTOMY, AV FISTULA, UPPER EXTREMITY, PERCUTANEOUS;  Surgeon: Ali Khoobehi, MD;  Location: [facility] CATH/EP LAB;  Service: Vascular;;    TOE AMPUTATION Left 8/26/2022    Procedure: AMPUTATION, TOE;  Surgeon: Porfirio Ponce DPM;  Location: [facility] OR;  Service: Podiatry;  Laterality: Left;     Past Medical History:   Diagnosis Date    A-fib     resolved per patient    Anemia due to end stage renal disease 6/30/2022    Arthritis     Bronchitis     Cardiovascular event risk, ASCVD 10-year risk 6.7% 10/15/2016    Diabetes mellitus type II     Diabetic foot infection     Diabetic ulcer of left midfoot 05/05/2022    Disorder of kidney and ureter     Encounter for blood transfusion     ESRD (end stage renal disease) 05/18/2018    MANJINDER (generalized anxiety disorder) 10/25/2016    History of colon polyps 11/02/2016    Hyperlipidemia     Hypertension     Stroke      Family History   Problem Relation Age of Onset    Hyperlipidemia Mother     Hypertension Mother     Hypertension Father     Diabetes Father     Diabetes Sister     Diabetes Sister     Diabetes Maternal Aunt     Diabetes Maternal Grandmother     Heart disease Maternal Grandmother     Cancer Paternal Grandmother     Breast  cancer Neg Hx     Colon cancer Neg Hx     Ovarian cancer Neg Hx         Social History:   Marital Status:   Alcohol History:  reports no history of alcohol use.  Tobacco History:  reports that she has never smoked. She has never used smokeless tobacco.  Drug History:  reports no history of drug use.    Review of patient's allergies indicates:   Allergen Reactions    Dilaudid [hydromorphone] Nausea And Vomiting    Chlorhexidine Itching    Lortab [hydrocodone-acetaminophen] Itching       Current Outpatient Medications   Medication Sig Dispense Refill    acetaminophen (TYLENOL) 325 MG tablet Take 325 mg by mouth every 6 (six) hours.      ALPRAZolam (XANAX) 0.5 MG tablet Take 1 tablet by mouth.      atorvastatin (LIPITOR) 80 MG tablet Take 1 tablet (80 mg total) by mouth once daily. 90 tablet 3    azelastine (ASTELIN) 137 mcg (0.1 %) nasal spray 1 spray (137 mcg total) by Nasal route 2 (two) times daily. 30 mL 3    blood sugar diagnostic Strp To check BG 4 times daily, to use with insurance preferred meter (Patient taking differently: 1 strip by Misc.(Non-Drug; Combo Route) route 4 (four) times daily. To check BG 4 times daily, to use with insurance preferred meter) 450 strip 3    blood sugar diagnostic Strp To check BG 3 times daily, to use with insurance preferred meter 100 strip 1    blood-glucose meter kit To check BG 3 times daily, to use with insurance preferred meter 1 each 0    cetirizine (ZYRTEC) 10 MG tablet Take 1 tablet (10 mg total) by mouth every other day. 45 tablet 3    clopidogreL (PLAVIX) 75 mg tablet Take 1 tablet (75 mg total) by mouth once daily. 90 tablet 3    epoetin chris-epbx (RETACRIT) 4,000 unit/mL injection Inject 1.11 mLs (4,440 Units total) into the skin every Mon, Wed, Fri.      fluticasone propionate (FLONASE) 50 mcg/actuation nasal spray 2 sprays (100 mcg total) by Each Nostril route once daily. 16 g 3    gabapentin (NEURONTIN) 100 MG capsule Take 1 capsule (100 mg total) by mouth  "2 (two) times daily. 180 capsule 3    hydrALAZINE (APRESOLINE) 25 MG tablet Take 1 tablet (25 mg total) by mouth 2 (two) times daily as needed (Systolic blood pressure > 170 mm Hg).      insulin aspart U-100 (NOVOLOG FLEXPEN U-100 INSULIN) 100 unit/mL (3 mL) InPn pen Inject 5 Units into the skin 3 (three) times daily with meals. 3 mL 6    insulin detemir U-100 (LEVEMIR) 100 unit/mL injection Inject 3 Units into the skin every evening. 2.7 mL 0    lancets Misc To check BG 3 times daily, to use with insurance preferred meter 100 each 1    minoxidiL (LONITEN) 10 MG Tab Take 10 mg by mouth 2 (two) times daily.      multivitamin (THERAGRAN) per tablet Take 1 tablet by mouth.      pen needle, diabetic (BD ULTRA-FINE ROBERT PEN NEEDLE) 32 gauge x 5/32" Ndle 1 pen by Misc.(Non-Drug; Combo Route) route 4 (four) times daily. To use 4 times per day with insulin injections. 100 each 1     No current facility-administered medications for this visit.       Review of Systems   Constitutional:  Negative for chills, fatigue, fever and unexpected weight change.   HENT:  Negative for hearing loss and trouble swallowing.    Eyes:  Negative for photophobia and visual disturbance.   Respiratory:  Negative for cough, shortness of breath and wheezing.    Cardiovascular:  Negative for chest pain, palpitations and leg swelling.   Gastrointestinal:  Negative for abdominal pain and nausea.   Genitourinary:  Negative for dysuria and frequency.   Musculoskeletal:  Negative for arthralgias, back pain, gait problem, joint swelling and myalgias.   Skin:  Positive for wound. Negative for rash.   Neurological:  Negative for tremors, seizures, weakness, numbness and headaches.   Hematological:  Does not bruise/bleed easily.       Objective:        Physical Exam:   Foot Exam  Physical Exam  Ortho/SPM Exam     Imaging:            Assessment:       1. Ulcer of left foot with necrosis of muscle    2. Diabetic ulcer of left midfoot associated with type 2 " diabetes mellitus, with necrosis of muscle    3. Peripheral vascular disease    4. Ulcer of left foot with fat layer exposed    5. Type 2 diabetes mellitus with hypoglycemia and coma, with long-term current use of insulin    6. Decreased pedal pulses    7. Type 2 diabetes mellitus with chronic kidney disease on chronic dialysis, with long-term current use of insulin    8. At high risk for inadequate nutritional intake    9. Heel ulcer, left, with necrosis of muscle    10. 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    11. Abrasion    12. Bilateral lower extremity edema    13. ESRD (end stage renal disease)    14. History of amputation of left foot    15. At risk for readmission to hospital    16. Difficulty in walking, not elsewhere classified    17. History of TIA (transient ischemic attack)    18. Diabetic foot infection    19. Hypertension associated with diabetes    20. Abrasion of left foot, initial encounter    21. Ulcer of toe of left foot, unspecified ulcer stage    22. Eschar of heel    23. Diabetic ulcer of other part of right foot associated with diabetes mellitus due to underlying condition, unspecified ulcer stage    24. Ulcer of right foot, unspecified ulcer stage      Plan:   Ulcer of left foot with necrosis of muscle    Diabetic ulcer of left midfoot associated with type 2 diabetes mellitus, with necrosis of muscle    Peripheral vascular disease    Ulcer of left foot with fat layer exposed    Type 2 diabetes mellitus with hypoglycemia and coma, with long-term current use of insulin    Decreased pedal pulses    Type 2 diabetes mellitus with chronic kidney disease on chronic dialysis, with long-term current use of insulin    At high risk for inadequate nutritional intake    Heel ulcer, left, with necrosis of muscle    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    Abrasion    Bilateral lower extremity edema    ESRD (end stage renal disease)    History of amputation of left  foot    At risk for readmission to hospital    Difficulty in walking, not elsewhere classified    History of TIA (transient ischemic attack)    Diabetic foot infection    Hypertension associated with diabetes    Abrasion of left foot, initial encounter    Ulcer of toe of left foot, unspecified ulcer stage    Eschar of heel    Diabetic ulcer of other part of right foot associated with diabetes mellitus due to underlying condition, unspecified ulcer stage    Ulcer of right foot, unspecified ulcer stage    Follow up in about 1 week (around 12/13/2022).    Procedures          Counseling:     I provided patient education verbally regarding:   Patient diagnosis, treatment options, as well as alternatives, risks, and benefits.     This note was created using Dragon voice recognition software that occasionally misinterpreted phrases or words.

## 2022-12-07 ENCOUNTER — TELEPHONE (OUTPATIENT)
Dept: FAMILY MEDICINE | Facility: CLINIC | Age: 57
End: 2022-12-07
Payer: MEDICARE

## 2022-12-07 NOTE — TELEPHONE ENCOUNTER
----- Message from Luis Burden sent at 12/7/2022 12:32 PM CST -----  Type: Needs Medical Advice  Who Called:  pt  Symptoms (please be specific):  pt wanted to know if the dr will order her a wheel chair said she can not put pressure on her foot--please call and advise  Best Call Back Number:  [phone] (home)       Additional Information: thank you

## 2022-12-12 ENCOUNTER — TELEPHONE (OUTPATIENT)
Dept: FAMILY MEDICINE | Facility: CLINIC | Age: 57
End: 2022-12-12
Payer: MEDICARE

## 2022-12-12 ENCOUNTER — EXTERNAL HOME HEALTH (OUTPATIENT)
Dept: HOME HEALTH SERVICES | Facility: HOSPITAL | Age: 57
End: 2022-12-12
Payer: MEDICARE

## 2022-12-12 NOTE — TELEPHONE ENCOUNTER
----- Message from Nhi Bran, Patient Care Assistant sent at 12/12/2022 12:19 PM CST -----  Regarding: advice  Contact: pt  Type: Needs Medical Advice  Who Called:  pt   Best Call Back Number: [phone] (home)     Additional Information: pt states she would like a callback regarding her insurance not covering insulin aspart U-100 (NOVOLOG FLEXPEN U-100 INSULIN) 100 unit/mL (3 mL) InPn pen and LEVEMIR FLEXTOUCH U-100 INSULN 100 unit/mL (3 mL) InPn pen. Please call pt to advise. Thanks!

## 2022-12-13 ENCOUNTER — OFFICE VISIT (OUTPATIENT)
Dept: WOUND CARE | Facility: HOSPITAL | Age: 57
End: 2022-12-13
Attending: PODIATRIST
Payer: MEDICARE

## 2022-12-13 VITALS
HEART RATE: 84 BPM | TEMPERATURE: 99 F | DIASTOLIC BLOOD PRESSURE: 76 MMHG | RESPIRATION RATE: 18 BRPM | SYSTOLIC BLOOD PRESSURE: 152 MMHG

## 2022-12-13 DIAGNOSIS — L97.423 DIABETIC ULCER OF LEFT MIDFOOT ASSOCIATED WITH TYPE 2 DIABETES MELLITUS, WITH NECROSIS OF MUSCLE: Primary | ICD-10-CM

## 2022-12-13 DIAGNOSIS — E11.621 DIABETIC ULCER OF LEFT MIDFOOT ASSOCIATED WITH TYPE 2 DIABETES MELLITUS, WITH NECROSIS OF MUSCLE: Primary | ICD-10-CM

## 2022-12-13 DIAGNOSIS — L97.523 ULCER OF LEFT FOOT WITH NECROSIS OF MUSCLE: ICD-10-CM

## 2022-12-13 DIAGNOSIS — Z91.89 AT RISK FOR READMISSION TO HOSPITAL: ICD-10-CM

## 2022-12-13 DIAGNOSIS — E11.641 TYPE 2 DIABETES MELLITUS WITH HYPOGLYCEMIA AND COMA, WITH LONG-TERM CURRENT USE OF INSULIN: ICD-10-CM

## 2022-12-13 DIAGNOSIS — Z79.4 TYPE 2 DIABETES MELLITUS WITH HYPOGLYCEMIA AND COMA, WITH LONG-TERM CURRENT USE OF INSULIN: ICD-10-CM

## 2022-12-13 DIAGNOSIS — Z86.73 HISTORY OF TIA (TRANSIENT ISCHEMIC ATTACK): ICD-10-CM

## 2022-12-13 DIAGNOSIS — I73.9 PERIPHERAL VASCULAR DISEASE: ICD-10-CM

## 2022-12-13 DIAGNOSIS — N18.6 TYPE 2 DIABETES MELLITUS WITH CHRONIC KIDNEY DISEASE ON CHRONIC DIALYSIS, WITH LONG-TERM CURRENT USE OF INSULIN: ICD-10-CM

## 2022-12-13 DIAGNOSIS — Z89.432 HISTORY OF AMPUTATION OF LEFT FOOT: ICD-10-CM

## 2022-12-13 DIAGNOSIS — Z79.4 TYPE 2 DIABETES MELLITUS WITH CHRONIC KIDNEY DISEASE ON CHRONIC DIALYSIS, WITH LONG-TERM CURRENT USE OF INSULIN: ICD-10-CM

## 2022-12-13 DIAGNOSIS — Z99.2 TYPE 2 DIABETES MELLITUS WITH CHRONIC KIDNEY DISEASE ON CHRONIC DIALYSIS, WITH LONG-TERM CURRENT USE OF INSULIN: ICD-10-CM

## 2022-12-13 DIAGNOSIS — E11.649 TYPE 2 DIABETES MELLITUS WITH HYPOGLYCEMIA UNAWARENESS: ICD-10-CM

## 2022-12-13 DIAGNOSIS — Z91.89 AT HIGH RISK FOR INADEQUATE NUTRITIONAL INTAKE: ICD-10-CM

## 2022-12-13 DIAGNOSIS — L97.522 ULCER OF LEFT FOOT WITH FAT LAYER EXPOSED: ICD-10-CM

## 2022-12-13 DIAGNOSIS — N18.6 ESRD (END STAGE RENAL DISEASE): ICD-10-CM

## 2022-12-13 DIAGNOSIS — R26.2 DIFFICULTY IN WALKING, NOT ELSEWHERE CLASSIFIED: ICD-10-CM

## 2022-12-13 DIAGNOSIS — L97.423 HEEL ULCER, LEFT, WITH NECROSIS OF MUSCLE: ICD-10-CM

## 2022-12-13 DIAGNOSIS — R60.0 BILATERAL LOWER EXTREMITY EDEMA: ICD-10-CM

## 2022-12-13 DIAGNOSIS — R09.89 DECREASED PEDAL PULSES: ICD-10-CM

## 2022-12-13 DIAGNOSIS — E11.22 TYPE 2 DIABETES MELLITUS WITH CHRONIC KIDNEY DISEASE ON CHRONIC DIALYSIS, WITH LONG-TERM CURRENT USE OF INSULIN: ICD-10-CM

## 2022-12-13 PROCEDURE — 1159F PR MEDICATION LIST DOCUMENTED IN MEDICAL RECORD: ICD-10-PCS | Mod: CPTII,,, | Performed by: PODIATRIST

## 2022-12-13 PROCEDURE — 3077F PR MOST RECENT SYSTOLIC BLOOD PRESSURE >= 140 MM HG: ICD-10-PCS | Mod: CPTII,,, | Performed by: PODIATRIST

## 2022-12-13 PROCEDURE — 3078F PR MOST RECENT DIASTOLIC BLOOD PRESSURE < 80 MM HG: ICD-10-PCS | Mod: CPTII,,, | Performed by: PODIATRIST

## 2022-12-13 PROCEDURE — 3078F DIAST BP <80 MM HG: CPT | Mod: CPTII,,, | Performed by: PODIATRIST

## 2022-12-13 PROCEDURE — 3066F PR DOCUMENTATION OF TREATMENT FOR NEPHROPATHY: ICD-10-PCS | Mod: CPTII,,, | Performed by: PODIATRIST

## 2022-12-13 PROCEDURE — 11042 DBRDMT SUBQ TIS 1ST 20SQCM/<: CPT | Mod: ,,, | Performed by: PODIATRIST

## 2022-12-13 PROCEDURE — 11042 DBRDMT SUBQ TIS 1ST 20SQCM/<: CPT | Performed by: PODIATRIST

## 2022-12-13 PROCEDURE — 1159F MED LIST DOCD IN RCRD: CPT | Mod: CPTII,,, | Performed by: PODIATRIST

## 2022-12-13 PROCEDURE — 11042 PR DEBRIDEMENT, SKIN, SUB-Q TISSUE,=<20 SQ CM: ICD-10-PCS | Mod: ,,, | Performed by: PODIATRIST

## 2022-12-13 PROCEDURE — 3051F HG A1C>EQUAL 7.0%<8.0%: CPT | Mod: CPTII,,, | Performed by: PODIATRIST

## 2022-12-13 PROCEDURE — 1160F RVW MEDS BY RX/DR IN RCRD: CPT | Mod: CPTII,,, | Performed by: PODIATRIST

## 2022-12-13 PROCEDURE — 99214 PR OFFICE/OUTPT VISIT, EST, LEVL IV, 30-39 MIN: ICD-10-PCS | Mod: 25,,, | Performed by: PODIATRIST

## 2022-12-13 PROCEDURE — 4010F ACE/ARB THERAPY RXD/TAKEN: CPT | Mod: CPTII,,, | Performed by: PODIATRIST

## 2022-12-13 PROCEDURE — 1160F PR REVIEW ALL MEDS BY PRESCRIBER/CLIN PHARMACIST DOCUMENTED: ICD-10-PCS | Mod: CPTII,,, | Performed by: PODIATRIST

## 2022-12-13 PROCEDURE — 3051F PR MOST RECENT HEMOGLOBIN A1C LEVEL 7.0 - < 8.0%: ICD-10-PCS | Mod: CPTII,,, | Performed by: PODIATRIST

## 2022-12-13 PROCEDURE — 4010F PR ACE/ARB THEARPY RXD/TAKEN: ICD-10-PCS | Mod: CPTII,,, | Performed by: PODIATRIST

## 2022-12-13 PROCEDURE — 3077F SYST BP >= 140 MM HG: CPT | Mod: CPTII,,, | Performed by: PODIATRIST

## 2022-12-13 PROCEDURE — 99214 OFFICE O/P EST MOD 30 MIN: CPT | Mod: 25,,, | Performed by: PODIATRIST

## 2022-12-13 PROCEDURE — 3066F NEPHROPATHY DOC TX: CPT | Mod: CPTII,,, | Performed by: PODIATRIST

## 2022-12-13 NOTE — PROGRESS NOTES
[address] [facility] [address]  [place] [address]  Phone: [phone]   [phone]    Patient's PCP:Stefano Lopez MD  Referring Provider: No ref. provider found    Subjective:      Chief Complaint:: Wound Care, Diabetes, Non-healing Wound, Diabetic Foot Ulcer, Pressure Ulcer, and Wound Check    HPI    Patient presents for follow-up of left plantar foot wound,  left heel wound, and left sub 1st metatarsal head wound.    Please see Wound docs for full assessment and evaluation of all wounds.     Patient was recently hospitalized and had vascular intervention performed.  Please see below for discharge summary.        1. Debridement of left heel wound.  Evaluation and assessment only of wound located on left plantar foot and sub 1st metatarsal head left foot wound. See Wound Docs for assessment of wounds and procedure notes  2. Continue taking all medications as prescribed  3. Continue home health dressing changes  4. RTC one week   5. Counseled patient on increasing protein intake, not getting wound wet, keeping dressing clean dry and intact, following a healthy diet, elevating legs when able, removing pressure from wound     Total time spent for E&M 35  Total time for debridement 35 minutes         NPWT is medically necessary for this patient at this time to acheive acceleration of granulation tissue and wound closure. It is my clinical judgement that this cannot be acheived using topical dressing or medications.      Counseling/Education:  I provided patient education verbally regarding:   The aspects of diabetes and how it pertains to the feet. I explained the importance of proper diabetic foot care and how it is essential for the health of their feet.     I discussed the importance of knowing their Hemoglobin A1c and that the level needs to be as close to 6 as possible. I discussed the increase complications of high blood sugar including stroke, blindness, heart attack, kidney failure and loss of limb  secondary to neuropathy and PVD.      With neuropathy, beware of any breaks in the skin or redness. These areas are not recognized early due to the numbness.     I discussed Diabetes, lower back issues, metabolic disorders, systemic causes, chemotherapy, vitamin deficiency, heavy metal exposure, as some of the causes. I also explained that as much as 40% of the time we can not find a cause. I discussed different treatments available to control the symptoms but which may not cure the problem.         Counseled patient on the aspects of diabetes and how it pertains to the feet.  I explained the importance of proper diabetic foot care and how it is essential for the health of their feet.        Shoe inspection. Patient instructed on proper foot hygeine. We discussed wearing proper shoe gear, daily foot inspections, never walking without protective shoe gear, never putting sharp instruments to feet, routine podiatric nail visits every 2-3 months.       Patient should call the office immediately if any signs of infection, such as fever, chills, sweats, increased redness or pain.     Patient was instructed to call the clinic or go to the emergency department if their symptoms do not improve, worsens, or if new symptoms develop.  Patient was advised that if any increased swelling, pain, or numbness arise to go immediately to the ED. Patient knows to call any time if an emergency arises. Shared decision making occurred and patient verbalized understanding in agreement with this plan.      I counseled the patient on their conditions, their implications and medical management.     >50% of this > 60 minute visit was spent face to face educating/counseling the patient     I spent a total of 60 minutes on the day of the visit.This includes face to face time and non-face to face time preparing to see the patient (eg, review of tests), obtaining and/or reviewing separately obtained history, documenting clinical information in the  electronic or other health record, independently interpreting results and communicating results to the patient/family/caregiver, or care coordinator.        Patient Name: Leanna García  MRN: [id number]  Patient Class: IP- Inpatient  Admission Date: 10/17/2022  Hospital Length of Stay: 2 days  Discharge Date and Time: 10/20/2022 11:05 AM  Attending Physician: Precious att. providers found   Discharging Provider: Pietro Shore MD  Primary Care Provider: Stefano Lopez MD        HPI:   57-year-old female with history of DM 2; ESRD on RRT; PAF, HLD, CAD, HTN, CVA, Non-healing DM foot ulcer was sent to ED for admission for angiography of lower extremities with possible surgical intervention for non-healing ulcer. She was recently in hospital for same. She has had osteomyelitis (bone biopsy grew Proteus) with eventual amputation of the 1st toe and partial 1st metatarsal head amputation on 8/26. This was followed by 2 weeks of cefepime as outpatient. Patient was discharged on clopidogrel, but reportedly did not start until a fortnight or so post discharge when she saw Podiatry for follow-up. She has since begun antiplatelet therapy. However, wound is not healing. Is for angiogram in AM with possible intervention pending angiography results. Denied chest discomfort. No SOB.      In ED: Labs reviewed: no leukocytosis with minimal normocytic anemia; trivial hyponatremia with end stage renal dysfunction. COVID negative. CXR reviewed: NAPD. EKG reviewed: sinus, left axis, no acute segments.     Discussed with ED MD: Inpatient admission to med/tele; continue home regimen for DM, ESRD, HTN, HLD and PAF; Nephrology for RRT; Vascular consult; Podiatry consult; Wound care consult; Insulin sliding scale        Procedure(s) (LRB):  Angiogram Extremity Unilateral (Left)       Hospital Course:   57-year-old with chronic left foot wound referred to emergency room by podiatrist and vascular surgery due to severe peripheral vascular  disease and worsening left foot wound.  Unfortunately patient was not taking any antiplatelets, she was evaluated by Infectious Disease, Podiatry and vascular surgery, she underwent single-vessel runoff and was found to have acceptable circulation.  She was cleared for discharge by multiple consultants, Infectious Disease recommended 14 more days of doxycycline.  She already has wound VAC on left foot.  Home health services were resumed.  Surprisingly patient was not requiring much insulin or antihypertensives.  We changed her insulin regimen as well as antihypertensive regimen and educated patient and .  She was advised to follow-up with her PCP and other outpatient providers.     I have seen the patient on the day of discharge and reviewed the discharge instructions as outlined below. Patient verbalized understanding and is aware to contact primary care physician or return to ED if new or worsening symptoms.         Goals of Care Treatment Preferences:  Code Status: Full Code           Much of the documentation for this visit was completed in the Wound Docs system.  Please see the attached documentation for further details about the patient's care. Scanned under the Media tab.         Alivia Bruno DPM        Vitals:    12/13/22 1213   BP: (!) 152/76   Pulse: 84   Resp: 18   Temp: 98.6 °F (37 °C)   PainSc: 0-No pain      Shoe Size:     Past Surgical History:   Procedure Laterality Date    ANGIOGRAPHY OF LOWER EXTREMITY Left 10/18/2022    Procedure: Angiogram Extremity Unilateral;  Surgeon: Ali Khoobehi, MD;  Location: [facility] CATH/EP LAB;  Service: Vascular;  Laterality: Left;    AV FISTULA PLACEMENT Left 8/29/2022    Procedure: CREATION, AV FISTULA;  Surgeon: Ali Khoobehi, MD;  Location: [facility] OR;  Service: Vascular;  Laterality: Left;    BONE BIOPSY Left 8/24/2022    Procedure: Biopsy-Bone;  Surgeon: Porfirio Ponce DPM;  Location: [facility] OR;  Service: Podiatry;  Laterality: Left;    COLONOSCOPY N/A  10/5/2016    Procedure: COLONOSCOPY;  Surgeon: Pillo Chanel MD;  Location: [facility] ENDO;  Service: Endoscopy;  Laterality: N/A;    COLONOSCOPY N/A 4/26/2022    Procedure: COLONOSCOPY;  Surgeon: Saravanan Rachel MD;  Location: [facility] ENDO;  Service: Endoscopy;  Laterality: N/A;    FISTULOGRAM Left 8/24/2022    Procedure: Fistulogram;  Surgeon: Ali Khoobehi, MD;  Location: [facility] CATH/EP LAB;  Service: Vascular;  Laterality: Left;    HERNIA REPAIR Bilateral 11/22/2016    inguinal    HYSTERECTOMY      INCISION AND DRAINAGE FOOT Left 5/13/2022    Procedure: INCISION AND DRAINAGE, FOOT;  Surgeon: Ricardo Bruno DPM;  Location: [facility] OR;  Service: Podiatry;  Laterality: Left;    OOPHORECTOMY      THROMBECTOMY, AV FISTULA, UPPER EXTREMITY, PERCUTANEOUS  8/24/2022    Procedure: THROMBECTOMY, AV FISTULA, UPPER EXTREMITY, PERCUTANEOUS;  Surgeon: Ali Khoobehi, MD;  Location: [facility] CATH/EP LAB;  Service: Vascular;;    TOE AMPUTATION Left 8/26/2022    Procedure: AMPUTATION, TOE;  Surgeon: Porfirio Ponce DPM;  Location: [facility] OR;  Service: Podiatry;  Laterality: Left;     Past Medical History:   Diagnosis Date    A-fib     resolved per patient    Anemia due to end stage renal disease 6/30/2022    Arthritis     Bronchitis     Cardiovascular event risk, ASCVD 10-year risk 6.7% 10/15/2016    Diabetes mellitus type II     Diabetic foot infection     Diabetic ulcer of left midfoot 05/05/2022    Disorder of kidney and ureter     Encounter for blood transfusion     ESRD (end stage renal disease) 05/18/2018    MANJINDER (generalized anxiety disorder) 10/25/2016    History of colon polyps 11/02/2016    Hyperlipidemia     Hypertension     Stroke      Family History   Problem Relation Age of Onset    Hyperlipidemia Mother     Hypertension Mother     Hypertension Father     Diabetes Father     Diabetes Sister     Diabetes Sister     Diabetes Maternal Aunt     Diabetes Maternal Grandmother     Heart disease Maternal Grandmother     Cancer Paternal  Grandmother     Breast cancer Neg Hx     Colon cancer Neg Hx     Ovarian cancer Neg Hx         Social History:   Marital Status:   Alcohol History:  reports no history of alcohol use.  Tobacco History:  reports that she has never smoked. She has never used smokeless tobacco.  Drug History:  reports no history of drug use.    Review of patient's allergies indicates:   Allergen Reactions    Dilaudid [hydromorphone] Nausea And Vomiting    Chlorhexidine Itching    Lortab [hydrocodone-acetaminophen] Itching       Current Outpatient Medications   Medication Sig Dispense Refill    acetaminophen (TYLENOL) 325 MG tablet Take 325 mg by mouth every 6 (six) hours.      ALPRAZolam (XANAX) 0.5 MG tablet Take 1 tablet by mouth.      atorvastatin (LIPITOR) 80 MG tablet Take 1 tablet (80 mg total) by mouth once daily. 90 tablet 3    azelastine (ASTELIN) 137 mcg (0.1 %) nasal spray 1 spray (137 mcg total) by Nasal route 2 (two) times daily. 30 mL 3    blood sugar diagnostic Strp To check BG 4 times daily, to use with insurance preferred meter (Patient taking differently: 1 strip by Misc.(Non-Drug; Combo Route) route 4 (four) times daily. To check BG 4 times daily, to use with insurance preferred meter) 450 strip 3    blood sugar diagnostic Strp To check BG 3 times daily, to use with insurance preferred meter 100 strip 1    blood-glucose meter kit To check BG 3 times daily, to use with insurance preferred meter 1 each 0    cetirizine (ZYRTEC) 10 MG tablet Take 1 tablet (10 mg total) by mouth every other day. 45 tablet 3    clopidogreL (PLAVIX) 75 mg tablet Take 1 tablet (75 mg total) by mouth once daily. 90 tablet 3    epoetin chris-epbx (RETACRIT) 4,000 unit/mL injection Inject 1.11 mLs (4,440 Units total) into the skin every Mon, Wed, Fri.      fluticasone propionate (FLONASE) 50 mcg/actuation nasal spray 2 sprays (100 mcg total) by Each Nostril route once daily. 16 g 3    gabapentin (NEURONTIN) 100 MG capsule Take 1 capsule  "(100 mg total) by mouth 2 (two) times daily. 180 capsule 3    hydrALAZINE (APRESOLINE) 25 MG tablet Take 1 tablet (25 mg total) by mouth 2 (two) times daily as needed (Systolic blood pressure > 170 mm Hg).      insulin aspart U-100 (NOVOLOG FLEXPEN U-100 INSULIN) 100 unit/mL (3 mL) InPn pen Inject 5 Units into the skin 3 (three) times daily with meals. 3 mL 6    lancets Misc To check BG 3 times daily, to use with insurance preferred meter 100 each 1    LEVEMIR FLEXTOUCH U-100 INSULN 100 unit/mL (3 mL) InPn pen INJECT 3 UNITS SUBCUTANEOUSLY IN THE EVENING 15 mL 0    minoxidiL (LONITEN) 10 MG Tab Take 10 mg by mouth 2 (two) times daily.      multivitamin (THERAGRAN) per tablet Take 1 tablet by mouth.      pen needle, diabetic (BD ULTRA-FINE ROBERT PEN NEEDLE) 32 gauge x 5/32" Ndle 1 pen by Misc.(Non-Drug; Combo Route) route 4 (four) times daily. To use 4 times per day with insulin injections. 100 each 1     No current facility-administered medications for this visit.       Review of Systems   Constitutional:  Negative for chills, fatigue, fever and unexpected weight change.   HENT:  Negative for hearing loss and trouble swallowing.    Eyes:  Negative for photophobia and visual disturbance.   Respiratory:  Negative for cough, shortness of breath and wheezing.    Cardiovascular:  Negative for chest pain, palpitations and leg swelling.   Gastrointestinal:  Negative for abdominal pain and nausea.   Genitourinary:  Negative for dysuria and frequency.   Musculoskeletal:  Negative for arthralgias, back pain, gait problem, joint swelling and myalgias.   Skin:  Positive for wound. Negative for rash.   Neurological:  Negative for tremors, seizures, weakness, numbness and headaches.   Hematological:  Does not bruise/bleed easily.       Objective:        Physical Exam:   Foot Exam  Physical Exam  Ortho/SPM Exam     Imaging:            Assessment:       1. Diabetic ulcer of left midfoot associated with type 2 diabetes mellitus, with " necrosis of muscle    2. Peripheral vascular disease    3. Ulcer of left foot with necrosis of muscle    4. Type 2 diabetes mellitus with hypoglycemia and coma, with long-term current use of insulin    5. Ulcer of left foot with fat layer exposed    6. Heel ulcer, left, with necrosis of muscle    7. 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 At high risk for inadequate nutritional intake    9. Type 2 diabetes mellitus with chronic kidney disease on chronic dialysis, with long-term current use of insulin    10. Decreased pedal pulses    11. Bilateral lower extremity edema    12. ESRD (end stage renal disease)    13. History of amputation of left foot    14. At risk for readmission to hospital    15. Difficulty in walking, not elsewhere classified    16. History of TIA (transient ischemic attack)      Plan:   Diabetic ulcer of left midfoot associated with type 2 diabetes mellitus, with necrosis of muscle    Peripheral vascular disease    Ulcer of left foot with necrosis of muscle    Type 2 diabetes mellitus with hypoglycemia and coma, with long-term current use of insulin    Ulcer of left foot with fat layer exposed    Heel ulcer, left, with necrosis of muscle    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    At high risk for inadequate nutritional intake    Type 2 diabetes mellitus with chronic kidney disease on chronic dialysis, with long-term current use of insulin    Decreased pedal pulses    Bilateral lower extremity edema    ESRD (end stage renal disease)    History of amputation of left foot    At risk for readmission to hospital    Difficulty in walking, not elsewhere classified    History of TIA (transient ischemic attack)      Follow up in about 3 weeks (around 1/3/2023).    Procedures          Counseling:     I provided patient education verbally regarding:   Patient diagnosis, treatment options, as well as alternatives, risks, and benefits.     This note was created using  Dragon voice recognition software that occasionally misinterpreted phrases or words.

## 2022-12-15 ENCOUNTER — TELEPHONE (OUTPATIENT)
Dept: FAMILY MEDICINE | Facility: CLINIC | Age: 57
End: 2022-12-15
Payer: MEDICARE

## 2022-12-15 NOTE — TELEPHONE ENCOUNTER
----- Message from Marilee Harley sent at 12/15/2022  9:23 AM CST -----  Contact: Annie from Pharmacy  Type:  Pharmacy Calling to Clarify an RX    Name of Caller: annie from pharmacy  Pharmacy Name:   [facility] [address] [facility] [address] [facility]  [address] [facility]  [place] [address]  Phone: [phone] Fax: [phone]     Prescription Name: insulin aspart U-100 (NOVOLOG FLEXPEN U-100 INSULIN) 100 unit/mL (3 mL) InPn pen   What do they need to clarify?: Insurance company will not cover medication-       insulin aspart U-100 (NOVOLOG FLEXPEN U-100 INSULIN) 100 unit/mL (3 mL) InPn pen    Additional Information: Insurance company will not cover   insulin aspart U-100 (NOVOLOG FLEXPEN U-100 INSULIN) 100 unit/mL (3 mL) InPn pen  But Insurance company will cover Humalog  Please contact pharmacy to Advise

## 2022-12-29 ENCOUNTER — TELEPHONE (OUTPATIENT)
Dept: FAMILY MEDICINE | Facility: CLINIC | Age: 57
End: 2022-12-29
Payer: MEDICARE

## 2022-12-29 NOTE — TELEPHONE ENCOUNTER
----- Message from Marilee Souza sent at 12/29/2022 10:41 AM CST -----  Contact: Patient  Type: Needs Medical Advice  Who Called:  Patient  Best Call Back Number:[phone]   Additional Information: Please contact patient to advise on medications.

## 2022-12-30 RX ORDER — INSULIN LISPRO 100 [IU]/ML
5 INJECTION, SOLUTION INTRAVENOUS; SUBCUTANEOUS
Qty: 4.5 ML | Refills: 11 | Status: SHIPPED | OUTPATIENT
Start: 2022-12-30 | End: 2023-06-06

## 2023-01-03 ENCOUNTER — OFFICE VISIT (OUTPATIENT)
Dept: WOUND CARE | Facility: HOSPITAL | Age: 58
End: 2023-01-03
Attending: PODIATRIST
Payer: MEDICARE

## 2023-01-03 VITALS
HEIGHT: 69 IN | RESPIRATION RATE: 18 BRPM | DIASTOLIC BLOOD PRESSURE: 63 MMHG | HEART RATE: 91 BPM | TEMPERATURE: 98 F | SYSTOLIC BLOOD PRESSURE: 94 MMHG | WEIGHT: 175 LBS | BODY MASS INDEX: 25.92 KG/M2

## 2023-01-03 DIAGNOSIS — Z91.89 AT HIGH RISK FOR INADEQUATE NUTRITIONAL INTAKE: ICD-10-CM

## 2023-01-03 DIAGNOSIS — N18.6 TYPE 2 DIABETES MELLITUS WITH CHRONIC KIDNEY DISEASE ON CHRONIC DIALYSIS, WITH LONG-TERM CURRENT USE OF INSULIN: ICD-10-CM

## 2023-01-03 DIAGNOSIS — Z91.89 AT RISK FOR READMISSION TO HOSPITAL: ICD-10-CM

## 2023-01-03 DIAGNOSIS — E11.641 TYPE 2 DIABETES MELLITUS WITH HYPOGLYCEMIA AND COMA, WITH LONG-TERM CURRENT USE OF INSULIN: ICD-10-CM

## 2023-01-03 DIAGNOSIS — Z79.4 TYPE 2 DIABETES MELLITUS WITH HYPOGLYCEMIA AND COMA, WITH LONG-TERM CURRENT USE OF INSULIN: ICD-10-CM

## 2023-01-03 DIAGNOSIS — R26.2 DIFFICULTY IN WALKING, NOT ELSEWHERE CLASSIFIED: ICD-10-CM

## 2023-01-03 DIAGNOSIS — E11.621 DIABETIC ULCER OF LEFT MIDFOOT ASSOCIATED WITH TYPE 2 DIABETES MELLITUS, WITH NECROSIS OF MUSCLE: ICD-10-CM

## 2023-01-03 DIAGNOSIS — Z89.432 HISTORY OF AMPUTATION OF LEFT FOOT: ICD-10-CM

## 2023-01-03 DIAGNOSIS — N18.6 ESRD (END STAGE RENAL DISEASE): ICD-10-CM

## 2023-01-03 DIAGNOSIS — R09.89 DECREASED PEDAL PULSES: ICD-10-CM

## 2023-01-03 DIAGNOSIS — I73.9 PERIPHERAL VASCULAR DISEASE: ICD-10-CM

## 2023-01-03 DIAGNOSIS — E11.649 TYPE 2 DIABETES MELLITUS WITH HYPOGLYCEMIA UNAWARENESS: ICD-10-CM

## 2023-01-03 DIAGNOSIS — Z99.2 TYPE 2 DIABETES MELLITUS WITH CHRONIC KIDNEY DISEASE ON CHRONIC DIALYSIS, WITH LONG-TERM CURRENT USE OF INSULIN: ICD-10-CM

## 2023-01-03 DIAGNOSIS — L97.423 DIABETIC ULCER OF LEFT MIDFOOT ASSOCIATED WITH TYPE 2 DIABETES MELLITUS, WITH NECROSIS OF MUSCLE: ICD-10-CM

## 2023-01-03 DIAGNOSIS — L97.522 ULCER OF LEFT FOOT WITH FAT LAYER EXPOSED: Primary | ICD-10-CM

## 2023-01-03 DIAGNOSIS — L97.423 HEEL ULCER, LEFT, WITH NECROSIS OF MUSCLE: ICD-10-CM

## 2023-01-03 DIAGNOSIS — R60.0 BILATERAL LOWER EXTREMITY EDEMA: ICD-10-CM

## 2023-01-03 DIAGNOSIS — E11.22 TYPE 2 DIABETES MELLITUS WITH CHRONIC KIDNEY DISEASE ON CHRONIC DIALYSIS, WITH LONG-TERM CURRENT USE OF INSULIN: ICD-10-CM

## 2023-01-03 DIAGNOSIS — Z79.4 TYPE 2 DIABETES MELLITUS WITH CHRONIC KIDNEY DISEASE ON CHRONIC DIALYSIS, WITH LONG-TERM CURRENT USE OF INSULIN: ICD-10-CM

## 2023-01-03 PROCEDURE — 99214 PR OFFICE/OUTPT VISIT, EST, LEVL IV, 30-39 MIN: ICD-10-PCS | Mod: 25,,, | Performed by: PODIATRIST

## 2023-01-03 PROCEDURE — 3008F PR BODY MASS INDEX (BMI) DOCUMENTED: ICD-10-PCS | Mod: CPTII,,, | Performed by: PODIATRIST

## 2023-01-03 PROCEDURE — 3078F PR MOST RECENT DIASTOLIC BLOOD PRESSURE < 80 MM HG: ICD-10-PCS | Mod: CPTII,,, | Performed by: PODIATRIST

## 2023-01-03 PROCEDURE — 3008F BODY MASS INDEX DOCD: CPT | Mod: CPTII,,, | Performed by: PODIATRIST

## 2023-01-03 PROCEDURE — 11042 PR DEBRIDEMENT, SKIN, SUB-Q TISSUE,=<20 SQ CM: ICD-10-PCS | Mod: ,,, | Performed by: PODIATRIST

## 2023-01-03 PROCEDURE — 1159F MED LIST DOCD IN RCRD: CPT | Mod: CPTII,,, | Performed by: PODIATRIST

## 2023-01-03 PROCEDURE — 11042 DBRDMT SUBQ TIS 1ST 20SQCM/<: CPT | Mod: ,,, | Performed by: PODIATRIST

## 2023-01-03 PROCEDURE — 99214 OFFICE O/P EST MOD 30 MIN: CPT | Mod: 25,,, | Performed by: PODIATRIST

## 2023-01-03 PROCEDURE — 1159F PR MEDICATION LIST DOCUMENTED IN MEDICAL RECORD: ICD-10-PCS | Mod: CPTII,,, | Performed by: PODIATRIST

## 2023-01-03 PROCEDURE — 3074F PR MOST RECENT SYSTOLIC BLOOD PRESSURE < 130 MM HG: ICD-10-PCS | Mod: CPTII,,, | Performed by: PODIATRIST

## 2023-01-03 PROCEDURE — 1160F PR REVIEW ALL MEDS BY PRESCRIBER/CLIN PHARMACIST DOCUMENTED: ICD-10-PCS | Mod: CPTII,,, | Performed by: PODIATRIST

## 2023-01-03 PROCEDURE — 1160F RVW MEDS BY RX/DR IN RCRD: CPT | Mod: CPTII,,, | Performed by: PODIATRIST

## 2023-01-03 PROCEDURE — 3074F SYST BP LT 130 MM HG: CPT | Mod: CPTII,,, | Performed by: PODIATRIST

## 2023-01-03 PROCEDURE — 11042 DBRDMT SUBQ TIS 1ST 20SQCM/<: CPT | Performed by: PODIATRIST

## 2023-01-03 PROCEDURE — 3078F DIAST BP <80 MM HG: CPT | Mod: CPTII,,, | Performed by: PODIATRIST

## 2023-01-03 NOTE — PROGRESS NOTES
[address] [facility] [address]  [place] [address]  Phone: [phone]   [phone]    Patient's PCP:Stefano Lopez MD  Referring Provider: No ref. provider found    Subjective:      Chief Complaint:: Wound Care, Non-healing Wound, Pressure Ulcer, Diabetic Foot Ulcer, Diabetes, and Wound Check    HPI     Patient presents for follow-up of left plantar foot wound and  left heel wound.     Please see Wound docs for full assessment and evaluation of all wounds.     Patient was recently hospitalized and had vascular intervention performed.  Please see below for discharge summary.        1. Debridement of left heel wound.  Evaluation and assessment only of wound located on left plantar foot wound. See Wound Docs for assessment of wounds and procedure notes  2. Continue taking all medications as prescribed  3. Continue home health dressing changes  4. RTC one week   5. Counseled patient on increasing protein intake, not getting wound wet, keeping dressing clean dry and intact, following a healthy diet, elevating legs when able, removing pressure from wound     Total time spent for E&M 35  Total time for debridement 35 minutes         NPWT is medically necessary for this patient at this time to acheive acceleration of granulation tissue and wound closure. It is my clinical judgement that this cannot be acheived using topical dressing or medications.      Counseling/Education:  I provided patient education verbally regarding:   The aspects of diabetes and how it pertains to the feet. I explained the importance of proper diabetic foot care and how it is essential for the health of their feet.     I discussed the importance of knowing their Hemoglobin A1c and that the level needs to be as close to 6 as possible. I discussed the increase complications of high blood sugar including stroke, blindness, heart attack, kidney failure and loss of limb secondary to neuropathy and PVD.      With neuropathy, beware of any breaks  in the skin or redness. These areas are not recognized early due to the numbness.     I discussed Diabetes, lower back issues, metabolic disorders, systemic causes, chemotherapy, vitamin deficiency, heavy metal exposure, as some of the causes. I also explained that as much as 40% of the time we can not find a cause. I discussed different treatments available to control the symptoms but which may not cure the problem.         Counseled patient on the aspects of diabetes and how it pertains to the feet.  I explained the importance of proper diabetic foot care and how it is essential for the health of their feet.        Shoe inspection. Patient instructed on proper foot hygeine. We discussed wearing proper shoe gear, daily foot inspections, never walking without protective shoe gear, never putting sharp instruments to feet, routine podiatric nail visits every 2-3 months.       Patient should call the office immediately if any signs of infection, such as fever, chills, sweats, increased redness or pain.     Patient was instructed to call the clinic or go to the emergency department if their symptoms do not improve, worsens, or if new symptoms develop.  Patient was advised that if any increased swelling, pain, or numbness arise to go immediately to the ED. Patient knows to call any time if an emergency arises. Shared decision making occurred and patient verbalized understanding in agreement with this plan.      I counseled the patient on their conditions, their implications and medical management.     >50% of this > 60 minute visit was spent face to face educating/counseling the patient     I spent a total of 60 minutes on the day of the visit.This includes face to face time and non-face to face time preparing to see the patient (eg, review of tests), obtaining and/or reviewing separately obtained history, documenting clinical information in the electronic or other health record, independently interpreting results and  communicating results to the patient/family/caregiver, or care coordinator.        Patient Name: Leanna García  MRN: [id number]  Patient Class: IP- Inpatient  Admission Date: 10/17/2022  Hospital Length of Stay: 2 days  Discharge Date and Time: 10/20/2022 11:05 AM  Attending Physician: Precious att. providers found   Discharging Provider: Pietro Shore MD  Primary Care Provider: Stefano Lopez MD        HPI:   57-year-old female with history of DM 2; ESRD on RRT; PAF, HLD, CAD, HTN, CVA, Non-healing DM foot ulcer was sent to ED for admission for angiography of lower extremities with possible surgical intervention for non-healing ulcer. She was recently in hospital for same. She has had osteomyelitis (bone biopsy grew Proteus) with eventual amputation of the 1st toe and partial 1st metatarsal head amputation on 8/26. This was followed by 2 weeks of cefepime as outpatient. Patient was discharged on clopidogrel, but reportedly did not start until a fortnight or so post discharge when she saw Podiatry for follow-up. She has since begun antiplatelet therapy. However, wound is not healing. Is for angiogram in AM with possible intervention pending angiography results. Denied chest discomfort. No SOB.      In ED: Labs reviewed: no leukocytosis with minimal normocytic anemia; trivial hyponatremia with end stage renal dysfunction. COVID negative. CXR reviewed: NAPD. EKG reviewed: sinus, left axis, no acute segments.     Discussed with ED MD: Inpatient admission to med/tele; continue home regimen for DM, ESRD, HTN, HLD and PAF; Nephrology for RRT; Vascular consult; Podiatry consult; Wound care consult; Insulin sliding scale        Procedure(s) (LRB):  Angiogram Extremity Unilateral (Left)       Hospital Course:   57-year-old with chronic left foot wound referred to emergency room by podiatrist and vascular surgery due to severe peripheral vascular disease and worsening left foot wound.  Unfortunately patient was not taking  "any antiplatelets, she was evaluated by Infectious Disease, Podiatry and vascular surgery, she underwent single-vessel runoff and was found to have acceptable circulation.  She was cleared for discharge by multiple consultants, Infectious Disease recommended 14 more days of doxycycline.  She already has wound VAC on left foot.  Home health services were resumed.  Surprisingly patient was not requiring much insulin or antihypertensives.  We changed her insulin regimen as well as antihypertensive regimen and educated patient and .  She was advised to follow-up with her PCP and other outpatient providers.     I have seen the patient on the day of discharge and reviewed the discharge instructions as outlined below. Patient verbalized understanding and is aware to contact primary care physician or return to ED if new or worsening symptoms.         Goals of Care Treatment Preferences:  Code Status: Full Code           Much of the documentation for this visit was completed in the Wound Docs system.  Please see the attached documentation for further details about the patient's care. Scanned under the Media tab.         Alivia Bruno DPM     Vitals:    01/03/23 1049   BP: 94/63   Pulse: 91   Resp: 18   Temp: 98.4 °F (36.9 °C)   Weight: 79.4 kg (175 lb)   Height: 5' 9" (1.753 m)   PainSc: 0-No pain      Shoe Size:     Past Surgical History:   Procedure Laterality Date    ANGIOGRAPHY OF LOWER EXTREMITY Left 10/18/2022    Procedure: Angiogram Extremity Unilateral;  Surgeon: Ali Khoobehi, MD;  Location: [facility] CATH/EP LAB;  Service: Vascular;  Laterality: Left;    AV FISTULA PLACEMENT Left 8/29/2022    Procedure: CREATION, AV FISTULA;  Surgeon: Ali Khoobehi, MD;  Location: [facility] OR;  Service: Vascular;  Laterality: Left;    BONE BIOPSY Left 8/24/2022    Procedure: Biopsy-Bone;  Surgeon: Porfirio Ponce DPM;  Location: [facility] OR;  Service: Podiatry;  Laterality: Left;    COLONOSCOPY N/A 10/5/2016    Procedure: COLONOSCOPY; "  Surgeon: Pillo Chanel MD;  Location: [facility] ENDO;  Service: Endoscopy;  Laterality: N/A;    COLONOSCOPY N/A 4/26/2022    Procedure: COLONOSCOPY;  Surgeon: Saravanan Rachel MD;  Location: [facility] ENDO;  Service: Endoscopy;  Laterality: N/A;    FISTULOGRAM Left 8/24/2022    Procedure: Fistulogram;  Surgeon: Ali Khoobehi, MD;  Location: [facility] CATH/EP LAB;  Service: Vascular;  Laterality: Left;    HERNIA REPAIR Bilateral 11/22/2016    inguinal    HYSTERECTOMY      INCISION AND DRAINAGE FOOT Left 5/13/2022    Procedure: INCISION AND DRAINAGE, FOOT;  Surgeon: Ricardo Bruno DPM;  Location: [facility] OR;  Service: Podiatry;  Laterality: Left;    OOPHORECTOMY      THROMBECTOMY, AV FISTULA, UPPER EXTREMITY, PERCUTANEOUS  8/24/2022    Procedure: THROMBECTOMY, AV FISTULA, UPPER EXTREMITY, PERCUTANEOUS;  Surgeon: Ali Khoobehi, MD;  Location: [facility] CATH/EP LAB;  Service: Vascular;;    TOE AMPUTATION Left 8/26/2022    Procedure: AMPUTATION, TOE;  Surgeon: Porfirio Ponce DPM;  Location: [facility] OR;  Service: Podiatry;  Laterality: Left;     Past Medical History:   Diagnosis Date    A-fib     resolved per patient    Anemia due to end stage renal disease 6/30/2022    Arthritis     Bronchitis     Cardiovascular event risk, ASCVD 10-year risk 6.7% 10/15/2016    Diabetes mellitus type II     Diabetic foot infection     Diabetic ulcer of left midfoot 05/05/2022    Disorder of kidney and ureter     Encounter for blood transfusion     ESRD (end stage renal disease) 05/18/2018    MANJINDER (generalized anxiety disorder) 10/25/2016    History of colon polyps 11/02/2016    Hyperlipidemia     Hypertension     Stroke      Family History   Problem Relation Age of Onset    Hyperlipidemia Mother     Hypertension Mother     Hypertension Father     Diabetes Father     Diabetes Sister     Diabetes Sister     Diabetes Maternal Aunt     Diabetes Maternal Grandmother     Heart disease Maternal Grandmother     Cancer Paternal Grandmother     Breast cancer Neg Hx      Colon cancer Neg Hx     Ovarian cancer Neg Hx         Social History:   Marital Status:   Alcohol History:  reports no history of alcohol use.  Tobacco History:  reports that she has never smoked. She has never used smokeless tobacco.  Drug History:  reports no history of drug use.    Review of patient's allergies indicates:   Allergen Reactions    Dilaudid [hydromorphone] Nausea And Vomiting    Chlorhexidine Itching    Lortab [hydrocodone-acetaminophen] Itching       Current Outpatient Medications   Medication Sig Dispense Refill    acetaminophen (TYLENOL) 325 MG tablet Take 325 mg by mouth every 6 (six) hours.      ALPRAZolam (XANAX) 0.5 MG tablet Take 1 tablet by mouth.      atorvastatin (LIPITOR) 80 MG tablet Take 1 tablet (80 mg total) by mouth once daily. 90 tablet 3    azelastine (ASTELIN) 137 mcg (0.1 %) nasal spray 1 spray (137 mcg total) by Nasal route 2 (two) times daily. 30 mL 3    blood sugar diagnostic Strp To check BG 4 times daily, to use with insurance preferred meter (Patient taking differently: 1 strip by Misc.(Non-Drug; Combo Route) route 4 (four) times daily. To check BG 4 times daily, to use with insurance preferred meter) 450 strip 3    blood sugar diagnostic Strp To check BG 3 times daily, to use with insurance preferred meter 100 strip 1    blood-glucose meter kit To check BG 3 times daily, to use with insurance preferred meter 1 each 0    cetirizine (ZYRTEC) 10 MG tablet Take 1 tablet (10 mg total) by mouth every other day. 45 tablet 3    clopidogreL (PLAVIX) 75 mg tablet Take 1 tablet (75 mg total) by mouth once daily. 90 tablet 3    epoetin chris-epbx (RETACRIT) 4,000 unit/mL injection Inject 1.11 mLs (4,440 Units total) into the skin every Mon, Wed, Fri.      fluticasone propionate (FLONASE) 50 mcg/actuation nasal spray 2 sprays (100 mcg total) by Each Nostril route once daily. 16 g 3    gabapentin (NEURONTIN) 100 MG capsule Take 1 capsule (100 mg total) by mouth 2 (two) times  "daily. 180 capsule 3    hydrALAZINE (APRESOLINE) 25 MG tablet Take 1 tablet (25 mg total) by mouth 2 (two) times daily as needed (Systolic blood pressure > 170 mm Hg).      insulin lispro (HUMALOG KWIKPEN INSULIN) 100 unit/mL pen Inject 5 Units into the skin 3 (three) times daily with meals. 4.5 mL 11    lancets [facility] To check BG 3 times daily, to use with insurance preferred meter 100 each 1    LEVEMIR FLEXTOUCH U-100 INSULN 100 unit/mL (3 mL) InPn pen INJECT 3 UNITS SUBCUTANEOUSLY IN THE EVENING 15 mL 0    minoxidiL (LONITEN) 10 MG Tab Take 10 mg by mouth 2 (two) times daily.      multivitamin (THERAGRAN) per tablet Take 1 tablet by mouth.      pen needle, diabetic (BD ULTRA-FINE ROBERT PEN NEEDLE) 32 gauge x 5/32" Ndle 1 pen by Misc.(Non-Drug; Combo Route) route 4 (four) times daily. To use 4 times per day with insulin injections. 100 each 1     No current facility-administered medications for this visit.       Review of Systems   Constitutional:  Negative for chills, fatigue, fever and unexpected weight change.   HENT:  Negative for hearing loss and trouble swallowing.    Eyes:  Negative for photophobia and visual disturbance.   Respiratory:  Negative for cough, shortness of breath and wheezing.    Cardiovascular:  Negative for chest pain, palpitations and leg swelling.   Gastrointestinal:  Negative for abdominal pain and nausea.   Genitourinary:  Negative for dysuria and frequency.   Musculoskeletal:  Negative for arthralgias, back pain, gait problem, joint swelling and myalgias.   Skin:  Positive for wound. Negative for rash.   Neurological:  Negative for tremors, seizures, weakness, numbness and headaches.   Hematological:  Does not bruise/bleed easily.       Objective:        Physical Exam:   Foot Exam  Physical Exam  Ortho/SPM Exam     Imaging:            Assessment:       1. Ulcer of left foot with fat layer exposed    2. Heel ulcer, left, with necrosis of muscle    3. Peripheral vascular disease    4. Type 2 " diabetes mellitus with hypoglycemia and coma, with long-term current use of insulin    5. Diabetic ulcer of left midfoot associated with type 2 diabetes mellitus, with necrosis of muscle    6. 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 At high risk for inadequate nutritional intake    8. Type 2 diabetes mellitus with chronic kidney disease on chronic dialysis, with long-term current use of insulin    9. Decreased pedal pulses    10. Bilateral lower extremity edema    11. ESRD (end stage renal disease)    12. History of amputation of left foot    13. Difficulty in walking, not elsewhere classified    14. At risk for readmission to hospital      Plan:   Ulcer of left foot with fat layer exposed    Heel ulcer, left, with necrosis of muscle    Peripheral vascular disease    Type 2 diabetes mellitus with hypoglycemia and coma, with long-term current use of insulin    Diabetic ulcer of left midfoot associated with type 2 diabetes mellitus, with necrosis of muscle    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    At high risk for inadequate nutritional intake    Type 2 diabetes mellitus with chronic kidney disease on chronic dialysis, with long-term current use of insulin    Decreased pedal pulses    Bilateral lower extremity edema    ESRD (end stage renal disease)    History of amputation of left foot    Difficulty in walking, not elsewhere classified    At risk for readmission to hospital      Follow up in about 3 weeks (around 1/24/2023).    Procedures          Counseling:     I provided patient education verbally regarding:   Patient diagnosis, treatment options, as well as alternatives, risks, and benefits.     This note was created using Dragon voice recognition software that occasionally misinterpreted phrases or words.

## 2023-01-12 ENCOUNTER — OFFICE VISIT (OUTPATIENT)
Dept: ENDOCRINOLOGY | Facility: CLINIC | Age: 58
End: 2023-01-12
Payer: MEDICARE

## 2023-01-12 VITALS
OXYGEN SATURATION: 98 % | HEART RATE: 84 BPM | HEIGHT: 69 IN | DIASTOLIC BLOOD PRESSURE: 84 MMHG | SYSTOLIC BLOOD PRESSURE: 138 MMHG | WEIGHT: 176.13 LBS | TEMPERATURE: 98 F | BODY MASS INDEX: 26.09 KG/M2

## 2023-01-12 DIAGNOSIS — L97.529 TYPE 2 DIABETES MELLITUS WITH LEFT DIABETIC FOOT ULCER: ICD-10-CM

## 2023-01-12 DIAGNOSIS — Z79.4 TYPE 2 DIABETES MELLITUS WITH CHRONIC KIDNEY DISEASE ON CHRONIC DIALYSIS, WITH LONG-TERM CURRENT USE OF INSULIN: ICD-10-CM

## 2023-01-12 DIAGNOSIS — E11.22 TYPE 2 DIABETES MELLITUS WITH CHRONIC KIDNEY DISEASE ON CHRONIC DIALYSIS, WITH LONG-TERM CURRENT USE OF INSULIN: ICD-10-CM

## 2023-01-12 DIAGNOSIS — Z99.2 TYPE 2 DIABETES MELLITUS WITH CHRONIC KIDNEY DISEASE ON CHRONIC DIALYSIS, WITH LONG-TERM CURRENT USE OF INSULIN: ICD-10-CM

## 2023-01-12 DIAGNOSIS — E78.5 HYPERLIPIDEMIA, UNSPECIFIED HYPERLIPIDEMIA TYPE: ICD-10-CM

## 2023-01-12 DIAGNOSIS — E11.621 TYPE 2 DIABETES MELLITUS WITH LEFT DIABETIC FOOT ULCER: ICD-10-CM

## 2023-01-12 DIAGNOSIS — N18.6 TYPE 2 DIABETES MELLITUS WITH CHRONIC KIDNEY DISEASE ON CHRONIC DIALYSIS, WITH LONG-TERM CURRENT USE OF INSULIN: ICD-10-CM

## 2023-01-12 DIAGNOSIS — E11.65 TYPE 2 DIABETES MELLITUS WITH HYPERGLYCEMIA, WITHOUT LONG-TERM CURRENT USE OF INSULIN: Primary | ICD-10-CM

## 2023-01-12 PROCEDURE — 3079F DIAST BP 80-89 MM HG: CPT | Mod: CPTII,S$GLB,, | Performed by: PHYSICIAN ASSISTANT

## 2023-01-12 PROCEDURE — 1160F PR REVIEW ALL MEDS BY PRESCRIBER/CLIN PHARMACIST DOCUMENTED: ICD-10-PCS | Mod: CPTII,S$GLB,, | Performed by: PHYSICIAN ASSISTANT

## 2023-01-12 PROCEDURE — 99999 PR PBB SHADOW E&M-EST. PATIENT-LVL V: CPT | Mod: PBBFAC,,, | Performed by: PHYSICIAN ASSISTANT

## 2023-01-12 PROCEDURE — 3075F SYST BP GE 130 - 139MM HG: CPT | Mod: CPTII,S$GLB,, | Performed by: PHYSICIAN ASSISTANT

## 2023-01-12 PROCEDURE — 99214 PR OFFICE/OUTPT VISIT, EST, LEVL IV, 30-39 MIN: ICD-10-PCS | Mod: S$GLB,,, | Performed by: PHYSICIAN ASSISTANT

## 2023-01-12 PROCEDURE — 3079F PR MOST RECENT DIASTOLIC BLOOD PRESSURE 80-89 MM HG: ICD-10-PCS | Mod: CPTII,S$GLB,, | Performed by: PHYSICIAN ASSISTANT

## 2023-01-12 PROCEDURE — 3075F PR MOST RECENT SYSTOLIC BLOOD PRESS GE 130-139MM HG: ICD-10-PCS | Mod: CPTII,S$GLB,, | Performed by: PHYSICIAN ASSISTANT

## 2023-01-12 PROCEDURE — 1160F RVW MEDS BY RX/DR IN RCRD: CPT | Mod: CPTII,S$GLB,, | Performed by: PHYSICIAN ASSISTANT

## 2023-01-12 PROCEDURE — 1159F PR MEDICATION LIST DOCUMENTED IN MEDICAL RECORD: ICD-10-PCS | Mod: CPTII,S$GLB,, | Performed by: PHYSICIAN ASSISTANT

## 2023-01-12 PROCEDURE — 1159F MED LIST DOCD IN RCRD: CPT | Mod: CPTII,S$GLB,, | Performed by: PHYSICIAN ASSISTANT

## 2023-01-12 PROCEDURE — 3008F PR BODY MASS INDEX (BMI) DOCUMENTED: ICD-10-PCS | Mod: CPTII,S$GLB,, | Performed by: PHYSICIAN ASSISTANT

## 2023-01-12 PROCEDURE — 99999 PR PBB SHADOW E&M-EST. PATIENT-LVL V: ICD-10-PCS | Mod: PBBFAC,,, | Performed by: PHYSICIAN ASSISTANT

## 2023-01-12 PROCEDURE — 99214 OFFICE O/P EST MOD 30 MIN: CPT | Mod: S$GLB,,, | Performed by: PHYSICIAN ASSISTANT

## 2023-01-12 PROCEDURE — 3008F BODY MASS INDEX DOCD: CPT | Mod: CPTII,S$GLB,, | Performed by: PHYSICIAN ASSISTANT

## 2023-01-12 RX ORDER — SUCROFERRIC OXYHYDROXIDE 500 MG/1
2 TABLET, CHEWABLE ORAL 3 TIMES DAILY
COMMUNITY
Start: 2022-11-23

## 2023-01-12 RX ORDER — INSULIN DETEMIR 100 [IU]/ML
INJECTION, SOLUTION SUBCUTANEOUS
Qty: 15 ML | Refills: 3 | Status: SHIPPED | OUTPATIENT
Start: 2023-01-12 | End: 2023-06-06

## 2023-01-12 NOTE — PROGRESS NOTES
"CC: T2DM    HPI: Leanna García is a 57 y.o. female presents for a follow up visit today for the management of T2DM.     She was diagnosed with Type 2  diabetes > 20 years old on routine lab work. She was initially started on Metformin. She was on insulin therapy years ago. She reports insulin was stopped and switched to Glipizide. Her two sisters, gm and father have DM. Last seen by ABBY Sweeney in 7/21.     Pt had to call EMS twice for hypoglycemia since last visit. Pt is seeing Dr. Bruno for foot ulcers on her left heel and left plantar.     She has CKD stage 5 on  HD- MWF-not a candidate for kidney transplant at this time due to lack of social support.  She has met with the transplant team  insulin restarted in 2020 when she started following with me for diabetes management.     Family hx of diabetes: 2 sisters, father- prediabetes , mother, and maternal grandmother   Hospitalized for diabetes: years ago due to hypoglycemia   Hypoglycemia again in 5/2021- BG dropped into the 30's and she was unresponsive     No personal or FH of thyroid cancer or personal of pancreatic cancer or pancreatitis.      Vicky her Home Health Nurse is there today to help with virtual visit.     Patient fears hypoglycemia  She is wearing the freestyle kaylah 2 with real time alerts.   However she is not giving her mealtime insulin if her blood sugar readings are"normal" even if she is going to eat.   She reports that if her blood sugar is under 150 or 160 she will not take her insulin  This is then leading to postprandial excursions.  However she is scanning only 30 minutes to an hour after her dinner meal.    Glucoses are ~190s.     DIABETES COMPLICATIONS: nephropathy, peripheral neuropathy and cerebrovascular disease    History of mini stroke in 2017  CKD stage 5 on dialysis    Diabetes Management Status    ASA:  Yes - 81 mg     Statin: Taking -- Lipitor and Zetia   ACE/ARB: Not taking--defer to Nephrology    Screening or Prevention " Patient's value Goal Complete/Controlled?   HgA1C Testing and Control   Lab Results   Component Value Date    HGBA1C 7.2 (H) 10/17/2022      Annually/Less than 8% No   Lipid profile : 03/10/2022 Annually Yes   LDL control Lab Results   Component Value Date    LDLCALC 158.0 03/10/2022    Annually/Less than 100 mg/dl  No   Nephropathy screening Lab Results   Component Value Date    LABMICR >5000.0 10/12/2017     Lab Results   Component Value Date    PROTEINUA 3+ (A) 06/21/2022    Annually No   Blood pressure BP Readings from Last 1 Encounters:   01/12/23 138/84    Less than 140/90 Yes   Dilated retinal exam : 09/12/2019 Annually Yes   Foot exam   : 07/05/2022 Annually Yes     CURRENT A1C:    Hemoglobin A1C   Date Value Ref Range Status   10/17/2022 7.2 (H) 4.5 - 6.2 % Final     Comment:     According to ADA guidelines, hemoglobin A1C <7.0% represents  optimal control in non-pregnant diabetic patients.  Different  metrics may apply to specific populations.   Standards of Medical Care in Diabetes - 2016.    For the purpose of screening for the presence of diabetes:  <5.7%     Consistent with the absence of diabetes  5.7-6.4%  Consistent with increasing risk for diabetes   (prediabetes)  >or=6.5%  Consistent with diabetes    Currently no consensus exists for use of hemoglobin A1C  for diagnosis of diabetes for children.     06/22/2022 7.5 (H) 4.5 - 6.2 % Final     Comment:     According to ADA guidelines, hemoglobin A1C <7.0% represents  optimal control in non-pregnant diabetic patients.  Different  metrics may apply to specific populations.   Standards of Medical Care in Diabetes - 2016.    For the purpose of screening for the presence of diabetes:  <5.7%     Consistent with the absence of diabetes  5.7-6.4%  Consistent with increasing risk for diabetes   (prediabetes)  >or=6.5%  Consistent with diabetes    Currently no consensus exists for use of hemoglobin A1C  for diagnosis of diabetes for children.     05/29/2022  8.3 (H) 4.0 - 5.6 % Final     Comment:     ADA Screening Guidelines:  5.7-6.4%  Consistent with prediabetes  >or=6.5%  Consistent with diabetes    High levels of fetal hemoglobin interfere with the HbA1C  assay. Heterozygous hemoglobin variants (HbS, HgC, etc)do  not significantly interfere with this assay.   However, presence of multiple variants may affect accuracy.     01/16/2019 6.4 % Final     GOAL A1C: 7.5% without hypoglycemia     DM MEDICATIONS USED IN THE PAST: Glipizide XR    Metformin-- stopped due to CKD   Insulin in the past -- Lantus   Novolog 70/30  Soliqua   Levemir   Novolog   Freestyle kaylah.     CURRENT DIABETES MEDICATIONS:  On dialysis days which are Monday Wednesday and Friday:   Take Levemir 5 units daily    Only takes Humalog 3 units if blood sugar is over 150 before a meal.     [facility] pharmacy for insulin.   Insulin: pens   Missed doses: denies     Eye exam: last year.  DE: years ago.  Podiatry exam:1/3/23 Dr. Bruno    Supplies from [facility]     Unable to download is patient is using the  and we are doing a virtual visit    HYPOGLYCEMIA:  Denies     MEALS: eating 3 meals per day   + dietary indiscretions.   Snack: junk food- everything - cookies, chips, candy   She is snacking after dinner   BF: oatmeal or grits and eggs   Lunch:  Dinner    Drinks: 2% milk with breakfast, SF tea, water   No soft drinks      CURRENT EXERCISE:  No    Review of Systems  Review of Systems   Constitutional:  Negative for appetite change and fatigue.   HENT:  Negative for trouble swallowing.    Eyes:  Negative for visual disturbance.   Respiratory:  Negative for shortness of breath.    Cardiovascular:  Negative for chest pain.   Gastrointestinal:  Negative for nausea.   Endocrine: Negative for polydipsia, polyphagia and polyuria.   Genitourinary:         No Nocturia    Skin:  Negative for wound.   Neurological:  Positive for numbness.     Physical Exam   Physical Exam  Constitutional:        Appearance: Normal appearance. She is obese.   HENT:      Head: Normocephalic.   Pulmonary:      Effort: Pulmonary effort is normal.   Neurological:      Mental Status: She is alert and oriented to person, place, and time.   Psychiatric:         Mood and Affect: Mood normal.         Behavior: Behavior normal.         Thought Content: Thought content normal.         Judgment: Judgment normal.       Lab Results   Component Value Date    TSH 5.000 11/03/2022       1. Type 2 diabetes mellitus with hyperglycemia, without long-term current use of insulin  Ambulatory referral/consult to Endocrinology    insulin detemir U-100 (LEVEMIR FLEXTOUCH U-100 INSULN) 100 unit/mL (3 mL) InPn pen    Diabetes Mellitus Evaluation, Serum    Ambulatory referral/consult to Diabetes Education    TSH    T4, Free    Comprehensive Metabolic Panel    Lipid Panel    Hemoglobin A1C    C-Peptide    TSH    T4, Free    Comprehensive Metabolic Panel    Lipid Panel    Hemoglobin A1C    C-Peptide    CANCELED: C-Peptide    CANCELED: Hemoglobin A1C    CANCELED: Lipid Panel    CANCELED: Comprehensive Metabolic Panel    CANCELED: T4, Free    CANCELED: TSH        T2DM, CKD- A1c today. Humalog with the sliding scale starting at 150 and adding +1. Increase Levemir to 6 units nightly. Referral to DE. Avoid hypoglycemia.  Avoid insulin stacking and hypoglycemia  On dialysis Monday Wednesday Friday  Continue to follow with Nephrology  Not currently a candidate for kidney transplant    PYF-xhajwu-antwsoaq statin  Left foot ulcer-optimize DM control.    F/u in 3 mths w/ labs prior

## 2023-01-12 NOTE — PATIENT INSTRUCTIONS
Instructions  Humalog with the sliding scale starting at 150 and adding 1  Levemir 6 units nightly  
English

## 2023-01-17 DIAGNOSIS — E11.65 TYPE 2 DIABETES MELLITUS WITH HYPERGLYCEMIA, WITHOUT LONG-TERM CURRENT USE OF INSULIN: ICD-10-CM

## 2023-01-17 NOTE — TELEPHONE ENCOUNTER
----- Message from Jarrett Benjamin sent at 1/17/2023 11:45 AM CST -----      Name of Who is Calling:PT          What is the request in detail:PT is requesting a call back to discuss all of her medications being refilled, she states her  will be out having surgery and she will not be able to pick anything up at that time, so she would like everything refilled now before her  goes out. She also stated she need this as well. blood sugar diagnostic Strp 100 strip 1 11/8/2022  No  Sig: To check BG 3 times daily, to use with insurance preferred meter  Sent to pharmacy as: blood sugar diagnostic Strp                Can the clinic reply by [facility]:no          What Number to Call Back if not in [phone]

## 2023-01-17 NOTE — TELEPHONE ENCOUNTER
LOV 11/8/22  NOV 5/18/23  Spoke to pt states her  is going to have surgery soon and is trying to make sure all meds are filled prior since she is unable to drive. Pt states she needs her losartan filled. It appears to have been d/c'd on med list. Pt states she is still taking medication and has never been advised to stop losartan. Pt states her BP was actually elevated recently and was advised to continue all BP meds. Pt also needs refill on test strips.   Spoke to pt and pharmacist, med adjusted in EMR

## 2023-01-17 NOTE — TELEPHONE ENCOUNTER
No new care gaps identified.  [facility] Embedded Care Gaps. Reference number: [id number]. 1/17/2023   3:16:58 PM CST

## 2023-01-24 ENCOUNTER — OFFICE VISIT (OUTPATIENT)
Dept: WOUND CARE | Facility: HOSPITAL | Age: 58
End: 2023-01-24
Attending: PODIATRIST
Payer: MEDICARE

## 2023-01-24 VITALS
RESPIRATION RATE: 18 BRPM | DIASTOLIC BLOOD PRESSURE: 88 MMHG | HEART RATE: 105 BPM | TEMPERATURE: 99 F | SYSTOLIC BLOOD PRESSURE: 141 MMHG

## 2023-01-24 DIAGNOSIS — Z86.73 HISTORY OF TIA (TRANSIENT ISCHEMIC ATTACK): ICD-10-CM

## 2023-01-24 DIAGNOSIS — L97.523 ULCER OF LEFT FOOT WITH NECROSIS OF MUSCLE: ICD-10-CM

## 2023-01-24 DIAGNOSIS — N18.6 ESRD (END STAGE RENAL DISEASE): ICD-10-CM

## 2023-01-24 DIAGNOSIS — E11.628 DIABETIC FOOT INFECTION: ICD-10-CM

## 2023-01-24 DIAGNOSIS — L08.9 DIABETIC FOOT INFECTION: ICD-10-CM

## 2023-01-24 DIAGNOSIS — I15.2 HYPERTENSION ASSOCIATED WITH DIABETES: ICD-10-CM

## 2023-01-24 DIAGNOSIS — Z91.89 AT RISK FOR READMISSION TO HOSPITAL: ICD-10-CM

## 2023-01-24 DIAGNOSIS — E11.22 TYPE 2 DIABETES MELLITUS WITH CHRONIC KIDNEY DISEASE ON CHRONIC DIALYSIS, WITH LONG-TERM CURRENT USE OF INSULIN: ICD-10-CM

## 2023-01-24 DIAGNOSIS — Z99.2 TYPE 2 DIABETES MELLITUS WITH CHRONIC KIDNEY DISEASE ON CHRONIC DIALYSIS, WITH LONG-TERM CURRENT USE OF INSULIN: ICD-10-CM

## 2023-01-24 DIAGNOSIS — Z91.89 AT HIGH RISK FOR INADEQUATE NUTRITIONAL INTAKE: ICD-10-CM

## 2023-01-24 DIAGNOSIS — N18.6 TYPE 2 DIABETES MELLITUS WITH CHRONIC KIDNEY DISEASE ON CHRONIC DIALYSIS, WITH LONG-TERM CURRENT USE OF INSULIN: ICD-10-CM

## 2023-01-24 DIAGNOSIS — Z79.4 TYPE 2 DIABETES MELLITUS WITH CHRONIC KIDNEY DISEASE ON CHRONIC DIALYSIS, WITH LONG-TERM CURRENT USE OF INSULIN: ICD-10-CM

## 2023-01-24 DIAGNOSIS — E11.641 TYPE 2 DIABETES MELLITUS WITH HYPOGLYCEMIA AND COMA, WITH LONG-TERM CURRENT USE OF INSULIN: ICD-10-CM

## 2023-01-24 DIAGNOSIS — L97.522 ULCER OF LEFT FOOT WITH FAT LAYER EXPOSED: ICD-10-CM

## 2023-01-24 DIAGNOSIS — R26.2 DIFFICULTY IN WALKING, NOT ELSEWHERE CLASSIFIED: ICD-10-CM

## 2023-01-24 DIAGNOSIS — Z89.432 HISTORY OF AMPUTATION OF LEFT FOOT: ICD-10-CM

## 2023-01-24 DIAGNOSIS — Z79.4 TYPE 2 DIABETES MELLITUS WITH HYPOGLYCEMIA AND COMA, WITH LONG-TERM CURRENT USE OF INSULIN: ICD-10-CM

## 2023-01-24 DIAGNOSIS — R09.89 DECREASED PEDAL PULSES: ICD-10-CM

## 2023-01-24 DIAGNOSIS — E11.621 DIABETIC ULCER OF LEFT MIDFOOT ASSOCIATED WITH TYPE 2 DIABETES MELLITUS, WITH NECROSIS OF MUSCLE: ICD-10-CM

## 2023-01-24 DIAGNOSIS — I73.9 PERIPHERAL VASCULAR DISEASE: ICD-10-CM

## 2023-01-24 DIAGNOSIS — L97.423 DIABETIC ULCER OF LEFT MIDFOOT ASSOCIATED WITH TYPE 2 DIABETES MELLITUS, WITH NECROSIS OF MUSCLE: ICD-10-CM

## 2023-01-24 DIAGNOSIS — E11.649 TYPE 2 DIABETES MELLITUS WITH HYPOGLYCEMIA UNAWARENESS: ICD-10-CM

## 2023-01-24 DIAGNOSIS — L97.423 HEEL ULCER, LEFT, WITH NECROSIS OF MUSCLE: Primary | ICD-10-CM

## 2023-01-24 DIAGNOSIS — E11.59 HYPERTENSION ASSOCIATED WITH DIABETES: ICD-10-CM

## 2023-01-24 DIAGNOSIS — R60.0 BILATERAL LOWER EXTREMITY EDEMA: ICD-10-CM

## 2023-01-24 PROCEDURE — 1159F PR MEDICATION LIST DOCUMENTED IN MEDICAL RECORD: ICD-10-PCS | Mod: CPTII,,, | Performed by: PODIATRIST

## 2023-01-24 PROCEDURE — 1160F RVW MEDS BY RX/DR IN RCRD: CPT | Mod: CPTII,,, | Performed by: PODIATRIST

## 2023-01-24 PROCEDURE — 3077F PR MOST RECENT SYSTOLIC BLOOD PRESSURE >= 140 MM HG: ICD-10-PCS | Mod: CPTII,,, | Performed by: PODIATRIST

## 2023-01-24 PROCEDURE — 11042 DBRDMT SUBQ TIS 1ST 20SQCM/<: CPT | Mod: ,,, | Performed by: PODIATRIST

## 2023-01-24 PROCEDURE — 3079F DIAST BP 80-89 MM HG: CPT | Mod: CPTII,,, | Performed by: PODIATRIST

## 2023-01-24 PROCEDURE — 3079F PR MOST RECENT DIASTOLIC BLOOD PRESSURE 80-89 MM HG: ICD-10-PCS | Mod: CPTII,,, | Performed by: PODIATRIST

## 2023-01-24 PROCEDURE — 11042 DBRDMT SUBQ TIS 1ST 20SQCM/<: CPT | Performed by: PODIATRIST

## 2023-01-24 PROCEDURE — 3077F SYST BP >= 140 MM HG: CPT | Mod: CPTII,,, | Performed by: PODIATRIST

## 2023-01-24 PROCEDURE — 99214 OFFICE O/P EST MOD 30 MIN: CPT | Mod: 25,,, | Performed by: PODIATRIST

## 2023-01-24 PROCEDURE — 99214 PR OFFICE/OUTPT VISIT, EST, LEVL IV, 30-39 MIN: ICD-10-PCS | Mod: 25,,, | Performed by: PODIATRIST

## 2023-01-24 PROCEDURE — 1159F MED LIST DOCD IN RCRD: CPT | Mod: CPTII,,, | Performed by: PODIATRIST

## 2023-01-24 PROCEDURE — 11042 PR DEBRIDEMENT, SKIN, SUB-Q TISSUE,=<20 SQ CM: ICD-10-PCS | Mod: ,,, | Performed by: PODIATRIST

## 2023-01-24 PROCEDURE — 1160F PR REVIEW ALL MEDS BY PRESCRIBER/CLIN PHARMACIST DOCUMENTED: ICD-10-PCS | Mod: CPTII,,, | Performed by: PODIATRIST

## 2023-01-24 RX ORDER — DOXYCYCLINE 100 MG/1
100 CAPSULE ORAL EVERY 12 HOURS
Qty: 28 CAPSULE | Refills: 0 | Status: SHIPPED | OUTPATIENT
Start: 2023-01-24 | End: 2023-02-07

## 2023-01-24 NOTE — PROGRESS NOTES
[address]  [place] [address]  Phone: [phone]   [phone]    Patient's PCP:Stefano Lopez MD  Referring Provider: Aaareferral Self    Subjective:      Chief Complaint:: Diabetic Foot Ulcer (left plantar foot wound and left heel wound./), Wound Care, Pressure Ulcer, Diabetes, Wound Check, and Non-healing Wound    HPI    Patient presents for follow-up of left plantar foot wound and left heel wound.     Please see Wound docs for full assessment and evaluation of all wounds.    1. Debridement of left heel wound.  Evaluation and assessment only of wound located on left plantar foot wound. See Wound Docs for assessment of wounds and procedure notes  2. Continue taking all medications as prescribed  3. Continue home health dressing changes  4. RTC one week   5. Counseled patient on increasing protein intake, not getting wound wet, keeping dressing clean dry and intact, following a healthy diet, elevating legs when able, removing pressure from wound     Total time spent for E&M 35  Total time for debridement 35 minutes         NPWT is medically necessary for this patient at this time to acheive acceleration of granulation tissue and wound closure. It is my clinical judgement that this cannot be acheived using topical dressing or medications.      Counseling/Education:  I provided patient education verbally regarding:   The aspects of diabetes and how it pertains to the feet. I explained the importance of proper diabetic foot care and how it is essential for the health of their feet.     I discussed the importance of knowing their Hemoglobin A1c and that the level needs to be as close to 6 as possible. I discussed the increase complications of high blood sugar including stroke, blindness, heart attack, kidney failure and loss of limb secondary to neuropathy and PVD.      With neuropathy, beware of any breaks in the skin or redness. These areas are not recognized early due to the numbness.      I discussed Diabetes, lower back issues, metabolic disorders, systemic causes, chemotherapy, vitamin deficiency, heavy metal exposure, as some of the causes. I also explained that as much as 40% of the time we can not find a cause. I discussed different treatments available to control the symptoms but which may not cure the problem.         Counseled patient on the aspects of diabetes and how it pertains to the feet.  I explained the importance of proper diabetic foot care and how it is essential for the health of their feet.        Shoe inspection. Patient instructed on proper foot hygeine. We discussed wearing proper shoe gear, daily foot inspections, never walking without protective shoe gear, never putting sharp instruments to feet, routine podiatric nail visits every 2-3 months.       Patient should call the office immediately if any signs of infection, such as fever, chills, sweats, increased redness or pain.     Patient was instructed to call the clinic or go to the emergency department if their symptoms do not improve, worsens, or if new symptoms develop.  Patient was advised that if any increased swelling, pain, or numbness arise to go immediately to the ED. Patient knows to call any time if an emergency arises. Shared decision making occurred and patient verbalized understanding in agreement with this plan.      I counseled the patient on their conditions, their implications and medical management.     >50% of this > 60 minute visit was spent face to face educating/counseling the patient     I spent a total of 60 minutes on the day of the visit.This includes face to face time and non-face to face time preparing to see the patient (eg, review of tests), obtaining and/or reviewing separately obtained history, documenting clinical information in the electronic or other health record, independently interpreting results and communicating results to the patient/family/caregiver, or care coordinator.         Patient Name: Leanna García  MRN: [id number]  Patient Class: IP- Inpatient  Admission Date: 10/17/2022  Hospital Length of Stay: 2 days  Discharge Date and Time: 10/20/2022 11:05 AM  Attending Physician: No att. providers found   Discharging Provider: Pietro Shore MD  Primary Care Provider: Stefano Lopez MD        HPI:   57-year-old female with history of DM 2; ESRD on RRT; PAF, HLD, CAD, HTN, CVA, Non-healing DM foot ulcer was sent to ED for admission for angiography of lower extremities with possible surgical intervention for non-healing ulcer. She was recently in hospital for same. She has had osteomyelitis (bone biopsy grew Proteus) with eventual amputation of the 1st toe and partial 1st metatarsal head amputation on 8/26. This was followed by 2 weeks of cefepime as outpatient. Patient was discharged on clopidogrel, but reportedly did not start until a fortnight or so post discharge when she saw Podiatry for follow-up. She has since begun antiplatelet therapy. However, wound is not healing. Is for angiogram in AM with possible intervention pending angiography results. Denied chest discomfort. No SOB.      In ED: Labs reviewed: no leukocytosis with minimal normocytic anemia; trivial hyponatremia with end stage renal dysfunction. COVID negative. CXR reviewed: NAPD. EKG reviewed: sinus, left axis, no acute segments.     Discussed with ED MD: Inpatient admission to med/tele; continue home regimen for DM, ESRD, HTN, HLD and PAF; Nephrology for RRT; Vascular consult; Podiatry consult; Wound care consult; Insulin sliding scale        Procedure(s) (LRB):  Angiogram Extremity Unilateral (Left)       Hospital Course:   57-year-old with chronic left foot wound referred to emergency room by podiatrist and vascular surgery due to severe peripheral vascular disease and worsening left foot wound.  Unfortunately patient was not taking any antiplatelets, she was evaluated by Infectious Disease, Podiatry and vascular  surgery, she underwent single-vessel runoff and was found to have acceptable circulation.  She was cleared for discharge by multiple consultants, Infectious Disease recommended 14 more days of doxycycline.  She already has wound VAC on left foot.  Home health services were resumed.  Surprisingly patient was not requiring much insulin or antihypertensives.  We changed her insulin regimen as well as antihypertensive regimen and educated patient and .  She was advised to follow-up with her PCP and other outpatient providers.     I have seen the patient on the day of discharge and reviewed the discharge instructions as outlined below. Patient verbalized understanding and is aware to contact primary care physician or return to ED if new or worsening symptoms.         Goals of Care Treatment Preferences:  Code Status: Full Code           Much of the documentation for this visit was completed in the Wound Docs system.  Please see the attached documentation for further details about the patient's care. Scanned under the Media tab.         Alivia Bruno DPM     Vitals:    01/24/23 1229   BP: (!) 141/88   Pulse: 105   Resp: 18   Temp: 98.6 °F (37 °C)   TempSrc: Temporal   PainSc: 0-No pain   PainLoc: Foot      Shoe Size:     Past Surgical History:   Procedure Laterality Date    ANGIOGRAPHY OF LOWER EXTREMITY Left 10/18/2022    Procedure: Angiogram Extremity Unilateral;  Surgeon: Ali Khoobehi, MD;  Location: [facility] CATH/EP LAB;  Service: Vascular;  Laterality: Left;    AV FISTULA PLACEMENT Left 8/29/2022    Procedure: CREATION, AV FISTULA;  Surgeon: Ali Khoobehi, MD;  Location: [facility] OR;  Service: Vascular;  Laterality: Left;    BONE BIOPSY Left 8/24/2022    Procedure: Biopsy-Bone;  Surgeon: Porfirio Ponce DPM;  Location: [facility] OR;  Service: Podiatry;  Laterality: Left;    COLONOSCOPY N/A 10/5/2016    Procedure: COLONOSCOPY;  Surgeon: Pillo Chanel MD;  Location: [facility] ENDO;  Service: Endoscopy;  Laterality: N/A;     COLONOSCOPY N/A 4/26/2022    Procedure: COLONOSCOPY;  Surgeon: Saravanan Rachel MD;  Location: [facility] ENDO;  Service: Endoscopy;  Laterality: N/A;    FISTULOGRAM Left 8/24/2022    Procedure: Fistulogram;  Surgeon: Ali Khoobehi, MD;  Location: [facility] CATH/EP LAB;  Service: Vascular;  Laterality: Left;    HERNIA REPAIR Bilateral 11/22/2016    inguinal    HYSTERECTOMY      INCISION AND DRAINAGE FOOT Left 5/13/2022    Procedure: INCISION AND DRAINAGE, FOOT;  Surgeon: Ricardo Bruno DPM;  Location: [facility] OR;  Service: Podiatry;  Laterality: Left;    OOPHORECTOMY      THROMBECTOMY, AV FISTULA, UPPER EXTREMITY, PERCUTANEOUS  8/24/2022    Procedure: THROMBECTOMY, AV FISTULA, UPPER EXTREMITY, PERCUTANEOUS;  Surgeon: Ali Khoobehi, MD;  Location: [facility] CATH/EP LAB;  Service: Vascular;;    TOE AMPUTATION Left 8/26/2022    Procedure: AMPUTATION, TOE;  Surgeon: Porfirio Ponce DPM;  Location: [facility] OR;  Service: Podiatry;  Laterality: Left;     Past Medical History:   Diagnosis Date    A-fib     resolved per patient    Anemia due to end stage renal disease 6/30/2022    Arthritis     Bronchitis     Cardiovascular event risk, ASCVD 10-year risk 6.7% 10/15/2016    Diabetes mellitus type II     Diabetic foot infection     Diabetic ulcer of left midfoot 05/05/2022    Disorder of kidney and ureter     Encounter for blood transfusion     ESRD (end stage renal disease) 05/18/2018    MANJINDER (generalized anxiety disorder) 10/25/2016    History of colon polyps 11/02/2016    Hyperlipidemia     Hypertension     Stroke      Family History   Problem Relation Age of Onset    Hyperlipidemia Mother     Hypertension Mother     Hypertension Father     Diabetes Father     Diabetes Sister     Diabetes Sister     Diabetes Maternal Aunt     Diabetes Maternal Grandmother     Heart disease Maternal Grandmother     Cancer Paternal Grandmother     Breast cancer Neg Hx     Colon cancer Neg Hx     Ovarian cancer Neg Hx         Social History:   Marital Status:    Alcohol History:  reports no history of alcohol use.  Tobacco History:  reports that she has never smoked. She has never used smokeless tobacco.  Drug History:  reports no history of drug use.    Review of patient's allergies indicates:   Allergen Reactions    Dilaudid [hydromorphone] Nausea And Vomiting    Chlorhexidine Itching    Lortab [hydrocodone-acetaminophen] Itching       Current Outpatient Medications   Medication Sig Dispense Refill    acetaminophen (TYLENOL) 325 MG tablet Take 325 mg by mouth every 6 (six) hours.      ALPRAZolam (XANAX) 0.5 MG tablet Take 1 tablet by mouth.      atorvastatin (LIPITOR) 80 MG tablet Take 1 tablet (80 mg total) by mouth once daily. 90 tablet 3    azelastine (ASTELIN) 137 mcg (0.1 %) nasal spray 1 spray (137 mcg total) by Nasal route 2 (two) times daily. 30 mL 3    blood sugar diagnostic Strp To check BG 3 times daily, to use with insurance preferred meter 100 strip 1    blood sugar diagnostic Strp To check BG 4 times daily, to use with insurance preferred meter 450 strip 3    blood-glucose meter kit To check BG 3 times daily, to use with insurance preferred meter 1 each 0    cetirizine (ZYRTEC) 10 MG tablet Take 1 tablet (10 mg total) by mouth every other day. (Patient not taking: Reported on 1/12/2023) 45 tablet 3    clopidogreL (PLAVIX) 75 mg tablet Take 1 tablet (75 mg total) by mouth once daily. 90 tablet 3    doxycycline (VIBRAMYCIN) 100 MG Cap Take 1 capsule (100 mg total) by mouth every 12 (twelve) hours. for 14 days 28 capsule 0    epoetin chris-epbx (RETACRIT) 4,000 unit/mL injection Inject 1.11 mLs (4,440 Units total) into the skin every Mon, Wed, Fri.      fluticasone propionate (FLONASE) 50 mcg/actuation nasal spray 2 sprays (100 mcg total) by Each Nostril route once daily. 16 g 3    gabapentin (NEURONTIN) 100 MG capsule Take 1 capsule (100 mg total) by mouth 2 (two) times daily. 180 capsule 3    hydrALAZINE (APRESOLINE) 25 MG tablet Take 1 tablet (25 mg  "total) by mouth 2 (two) times daily as needed (Systolic blood pressure > 170 mm Hg). (Patient not taking: Reported on 1/12/2023)      insulin detemir U-100 (LEVEMIR FLEXTOUCH U-100 INSULN) 100 unit/mL (3 mL) InPn pen INJECT 6 UNITS SUBCUTANEOUSLY IN THE EVENING 15 mL 3    insulin lispro (HUMALOG KWIKPEN INSULIN) 100 unit/mL pen Inject 5 Units into the skin 3 (three) times daily with meals. 4.5 mL 11    lancets [facility] To check BG 3 times daily, to use with insurance preferred meter 100 each 1    minoxidiL (LONITEN) 10 MG Tab Take 10 mg by mouth 2 (two) times daily.      multivitamin (THERAGRAN) per tablet Take 1 tablet by mouth.      pen needle, diabetic (BD ULTRA-FINE ROBERT PEN NEEDLE) 32 gauge x 5/32" Ndle 1 pen by Misc.(Non-Drug; Combo Route) route 4 (four) times daily. To use 4 times per day with insulin injections. 100 each 1    sucroferric oxyhydroxide (VELPHORO) 500 mg Chew Take 3 tablets by mouth.       No current facility-administered medications for this visit.       Review of Systems   Constitutional:  Negative for chills, fatigue, fever and unexpected weight change.   HENT:  Negative for hearing loss and trouble swallowing.    Eyes:  Negative for photophobia and visual disturbance.   Respiratory:  Negative for cough, shortness of breath and wheezing.    Cardiovascular:  Negative for chest pain, palpitations and leg swelling.   Gastrointestinal:  Negative for abdominal pain and nausea.   Genitourinary:  Negative for dysuria and frequency.   Musculoskeletal:  Negative for arthralgias, back pain, gait problem, joint swelling and myalgias.   Skin:  Positive for wound. Negative for rash.   Neurological:  Negative for tremors, seizures, weakness, numbness and headaches.   Hematological:  Does not bruise/bleed easily.       Objective:        Physical Exam:   Foot Exam  Physical Exam  Ortho/SPM Exam     Imaging:            Assessment:       1. Heel ulcer, left, with necrosis of muscle    2. Ulcer of left foot with " fat layer exposed    3. Peripheral vascular disease    4. 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    5. Diabetic ulcer of left midfoot associated with type 2 diabetes mellitus, with necrosis of muscle    6. Type 2 diabetes mellitus with hypoglycemia and coma, with long-term current use of insulin    7. At high risk for inadequate nutritional intake    8. Type 2 diabetes mellitus with chronic kidney disease on chronic dialysis, with long-term current use of insulin    9. Decreased pedal pulses    10. History of amputation of left foot    11. ESRD (end stage renal disease)    12. Bilateral lower extremity edema    13. At risk for readmission to hospital    14. Ulcer of left foot with necrosis of muscle    15. Difficulty in walking, not elsewhere classified    16. History of TIA (transient ischemic attack)    17. Diabetic foot infection    18. Hypertension associated with diabetes      Plan:   Heel ulcer, left, with necrosis of muscle    Ulcer of left foot with fat layer exposed    Peripheral vascular disease    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    Diabetic ulcer of left midfoot associated with type 2 diabetes mellitus, with necrosis of muscle    Type 2 diabetes mellitus with hypoglycemia and coma, with long-term current use of insulin    At high risk for inadequate nutritional intake    Type 2 diabetes mellitus with chronic kidney disease on chronic dialysis, with long-term current use of insulin    Decreased pedal pulses    History of amputation of left foot    ESRD (end stage renal disease)    Bilateral lower extremity edema    At risk for readmission to hospital    Ulcer of left foot with necrosis of muscle    Difficulty in walking, not elsewhere classified    History of TIA (transient ischemic attack)    Diabetic foot infection    Hypertension associated with diabetes    Other orders  -     doxycycline (VIBRAMYCIN) 100 MG Cap; Take 1 capsule (100 mg total) by mouth every 12  (twelve) hours. for 14 days  Dispense: 28 capsule; Refill: 0      Follow up in about 1 week (around 1/31/2023).    Procedures          Counseling:     I provided patient education verbally regarding:   Patient diagnosis, treatment options, as well as alternatives, risks, and benefits.     This note was created using Dragon voice recognition software that occasionally misinterpreted phrases or words.

## 2023-02-06 ENCOUNTER — DOCUMENT SCAN (OUTPATIENT)
Dept: HOME HEALTH SERVICES | Facility: HOSPITAL | Age: 58
End: 2023-02-06
Payer: MEDICARE

## 2023-02-07 ENCOUNTER — OFFICE VISIT (OUTPATIENT)
Dept: WOUND CARE | Facility: HOSPITAL | Age: 58
End: 2023-02-07
Attending: PODIATRIST
Payer: MEDICARE

## 2023-02-07 VITALS
DIASTOLIC BLOOD PRESSURE: 71 MMHG | SYSTOLIC BLOOD PRESSURE: 157 MMHG | RESPIRATION RATE: 18 BRPM | TEMPERATURE: 99 F | HEART RATE: 75 BPM

## 2023-02-07 DIAGNOSIS — Z79.4 TYPE 2 DIABETES MELLITUS WITH CHRONIC KIDNEY DISEASE ON CHRONIC DIALYSIS, WITH LONG-TERM CURRENT USE OF INSULIN: ICD-10-CM

## 2023-02-07 DIAGNOSIS — Z99.2 TYPE 2 DIABETES MELLITUS WITH CHRONIC KIDNEY DISEASE ON CHRONIC DIALYSIS, WITH LONG-TERM CURRENT USE OF INSULIN: ICD-10-CM

## 2023-02-07 DIAGNOSIS — I73.9 PERIPHERAL VASCULAR DISEASE: ICD-10-CM

## 2023-02-07 DIAGNOSIS — L97.522 ULCER OF LEFT FOOT WITH FAT LAYER EXPOSED: ICD-10-CM

## 2023-02-07 DIAGNOSIS — Z89.432 HISTORY OF AMPUTATION OF LEFT FOOT: ICD-10-CM

## 2023-02-07 DIAGNOSIS — E11.641 TYPE 2 DIABETES MELLITUS WITH HYPOGLYCEMIA AND COMA, WITH LONG-TERM CURRENT USE OF INSULIN: ICD-10-CM

## 2023-02-07 DIAGNOSIS — E11.649 TYPE 2 DIABETES MELLITUS WITH HYPOGLYCEMIA UNAWARENESS: ICD-10-CM

## 2023-02-07 DIAGNOSIS — Z91.89 AT HIGH RISK FOR INADEQUATE NUTRITIONAL INTAKE: ICD-10-CM

## 2023-02-07 DIAGNOSIS — N18.6 ESRD (END STAGE RENAL DISEASE): ICD-10-CM

## 2023-02-07 DIAGNOSIS — R26.2 DIFFICULTY IN WALKING, NOT ELSEWHERE CLASSIFIED: ICD-10-CM

## 2023-02-07 DIAGNOSIS — E11.621 DIABETIC ULCER OF LEFT MIDFOOT ASSOCIATED WITH TYPE 2 DIABETES MELLITUS, WITH NECROSIS OF MUSCLE: ICD-10-CM

## 2023-02-07 DIAGNOSIS — L97.423 DIABETIC ULCER OF LEFT MIDFOOT ASSOCIATED WITH TYPE 2 DIABETES MELLITUS, WITH NECROSIS OF MUSCLE: ICD-10-CM

## 2023-02-07 DIAGNOSIS — R09.89 DECREASED PEDAL PULSES: ICD-10-CM

## 2023-02-07 DIAGNOSIS — E11.22 TYPE 2 DIABETES MELLITUS WITH CHRONIC KIDNEY DISEASE ON CHRONIC DIALYSIS, WITH LONG-TERM CURRENT USE OF INSULIN: ICD-10-CM

## 2023-02-07 DIAGNOSIS — Z91.89 AT RISK FOR READMISSION TO HOSPITAL: ICD-10-CM

## 2023-02-07 DIAGNOSIS — L97.523 ULCER OF LEFT FOOT WITH NECROSIS OF MUSCLE: ICD-10-CM

## 2023-02-07 DIAGNOSIS — N18.6 TYPE 2 DIABETES MELLITUS WITH CHRONIC KIDNEY DISEASE ON CHRONIC DIALYSIS, WITH LONG-TERM CURRENT USE OF INSULIN: ICD-10-CM

## 2023-02-07 DIAGNOSIS — L97.423 HEEL ULCER, LEFT, WITH NECROSIS OF MUSCLE: Primary | ICD-10-CM

## 2023-02-07 DIAGNOSIS — Z79.4 TYPE 2 DIABETES MELLITUS WITH HYPOGLYCEMIA AND COMA, WITH LONG-TERM CURRENT USE OF INSULIN: ICD-10-CM

## 2023-02-07 PROCEDURE — 99214 PR OFFICE/OUTPT VISIT, EST, LEVL IV, 30-39 MIN: ICD-10-PCS | Mod: 25,,, | Performed by: PODIATRIST

## 2023-02-07 PROCEDURE — 1159F MED LIST DOCD IN RCRD: CPT | Mod: CPTII,,, | Performed by: PODIATRIST

## 2023-02-07 PROCEDURE — 11042 DBRDMT SUBQ TIS 1ST 20SQCM/<: CPT | Mod: ,,, | Performed by: PODIATRIST

## 2023-02-07 PROCEDURE — 11042 PR DEBRIDEMENT, SKIN, SUB-Q TISSUE,=<20 SQ CM: ICD-10-PCS | Mod: ,,, | Performed by: PODIATRIST

## 2023-02-07 PROCEDURE — 1160F PR REVIEW ALL MEDS BY PRESCRIBER/CLIN PHARMACIST DOCUMENTED: ICD-10-PCS | Mod: CPTII,,, | Performed by: PODIATRIST

## 2023-02-07 PROCEDURE — 3078F PR MOST RECENT DIASTOLIC BLOOD PRESSURE < 80 MM HG: ICD-10-PCS | Mod: CPTII,,, | Performed by: PODIATRIST

## 2023-02-07 PROCEDURE — 3078F DIAST BP <80 MM HG: CPT | Mod: CPTII,,, | Performed by: PODIATRIST

## 2023-02-07 PROCEDURE — 1159F PR MEDICATION LIST DOCUMENTED IN MEDICAL RECORD: ICD-10-PCS | Mod: CPTII,,, | Performed by: PODIATRIST

## 2023-02-07 PROCEDURE — 4010F ACE/ARB THERAPY RXD/TAKEN: CPT | Mod: CPTII,,, | Performed by: PODIATRIST

## 2023-02-07 PROCEDURE — 3077F SYST BP >= 140 MM HG: CPT | Mod: CPTII,,, | Performed by: PODIATRIST

## 2023-02-07 PROCEDURE — 99214 OFFICE O/P EST MOD 30 MIN: CPT | Mod: 25,,, | Performed by: PODIATRIST

## 2023-02-07 PROCEDURE — 3077F PR MOST RECENT SYSTOLIC BLOOD PRESSURE >= 140 MM HG: ICD-10-PCS | Mod: CPTII,,, | Performed by: PODIATRIST

## 2023-02-07 PROCEDURE — 4010F PR ACE/ARB THEARPY RXD/TAKEN: ICD-10-PCS | Mod: CPTII,,, | Performed by: PODIATRIST

## 2023-02-07 PROCEDURE — 1160F RVW MEDS BY RX/DR IN RCRD: CPT | Mod: CPTII,,, | Performed by: PODIATRIST

## 2023-02-07 PROCEDURE — 11042 DBRDMT SUBQ TIS 1ST 20SQCM/<: CPT | Performed by: PODIATRIST

## 2023-02-07 NOTE — PROGRESS NOTES
[address] [facility] [address]  [place] [address]  Phone: [phone]   [phone]    Patient's PCP:Stefano Lopez MD  Referring Provider: No ref. provider found    Subjective:      Chief Complaint:: Wound Care, Pressure Ulcer, Non-healing Wound, Diabetic Foot Ulcer (left plantar foot wound and left heel wound), Wound Check, and Diabetes    HPI    Patient presents for follow-up of left plantar foot wound and left heel wound.     Please see Wound docs for full assessment and evaluation of all wounds.     1. Debridement of wound located on left plantar foot. Evaluation and assessment only of left heel wound.See Wound Docs for assessment of wounds and procedure notes  2. Continue taking all medications as prescribed  3. Continue home health dressing changes  4. RTC one week   5. Counseled patient on increasing protein intake, not getting wound wet, keeping dressing clean dry and intact, following a healthy diet, elevating legs when able, removing pressure from wound     Total time spent for E&M 35  Total time for debridement 35 minutes         NPWT is medically necessary for this patient at this time to acheive acceleration of granulation tissue and wound closure. It is my clinical judgement that this cannot be acheived using topical dressing or medications.      Counseling/Education:  I provided patient education verbally regarding:   The aspects of diabetes and how it pertains to the feet. I explained the importance of proper diabetic foot care and how it is essential for the health of their feet.     I discussed the importance of knowing their Hemoglobin A1c and that the level needs to be as close to 6 as possible. I discussed the increase complications of high blood sugar including stroke, blindness, heart attack, kidney failure and loss of limb secondary to neuropathy and PVD.      With neuropathy, beware of any breaks in the skin or redness. These areas are not recognized early due to the numbness.      I discussed Diabetes, lower back issues, metabolic disorders, systemic causes, chemotherapy, vitamin deficiency, heavy metal exposure, as some of the causes. I also explained that as much as 40% of the time we can not find a cause. I discussed different treatments available to control the symptoms but which may not cure the problem.         Counseled patient on the aspects of diabetes and how it pertains to the feet.  I explained the importance of proper diabetic foot care and how it is essential for the health of their feet.        Shoe inspection. Patient instructed on proper foot hygeine. We discussed wearing proper shoe gear, daily foot inspections, never walking without protective shoe gear, never putting sharp instruments to feet, routine podiatric nail visits every 2-3 months.       Patient should call the office immediately if any signs of infection, such as fever, chills, sweats, increased redness or pain.     Patient was instructed to call the clinic or go to the emergency department if their symptoms do not improve, worsens, or if new symptoms develop.  Patient was advised that if any increased swelling, pain, or numbness arise to go immediately to the ED. Patient knows to call any time if an emergency arises. Shared decision making occurred and patient verbalized understanding in agreement with this plan.      I counseled the patient on their conditions, their implications and medical management.     >50% of this > 60 minute visit was spent face to face educating/counseling the patient     I spent a total of 60 minutes on the day of the visit.This includes face to face time and non-face to face time preparing to see the patient (eg, review of tests), obtaining and/or reviewing separately obtained history, documenting clinical information in the electronic or other health record, independently interpreting results and communicating results to the patient/family/caregiver, or care coordinator.         Patient Name: Leanna García  MRN: [id number]  Patient Class: IP- Inpatient  Admission Date: 10/17/2022  Hospital Length of Stay: 2 days  Discharge Date and Time: 10/20/2022 11:05 AM  Attending Physician: No att. providers found   Discharging Provider: Pietro Shore MD  Primary Care Provider: Stefano Lopez MD        HPI:   57-year-old female with history of DM 2; ESRD on RRT; PAF, HLD, CAD, HTN, CVA, Non-healing DM foot ulcer was sent to ED for admission for angiography of lower extremities with possible surgical intervention for non-healing ulcer. She was recently in hospital for same. She has had osteomyelitis (bone biopsy grew Proteus) with eventual amputation of the 1st toe and partial 1st metatarsal head amputation on 8/26. This was followed by 2 weeks of cefepime as outpatient. Patient was discharged on clopidogrel, but reportedly did not start until a fortnight or so post discharge when she saw Podiatry for follow-up. She has since begun antiplatelet therapy. However, wound is not healing. Is for angiogram in AM with possible intervention pending angiography results. Denied chest discomfort. No SOB.      In ED: Labs reviewed: no leukocytosis with minimal normocytic anemia; trivial hyponatremia with end stage renal dysfunction. COVID negative. CXR reviewed: NAPD. EKG reviewed: sinus, left axis, no acute segments.     Discussed with ED MD: Inpatient admission to med/tele; continue home regimen for DM, ESRD, HTN, HLD and PAF; Nephrology for RRT; Vascular consult; Podiatry consult; Wound care consult; Insulin sliding scale        Procedure(s) (LRB):  Angiogram Extremity Unilateral (Left)       Hospital Course:   57-year-old with chronic left foot wound referred to emergency room by podiatrist and vascular surgery due to severe peripheral vascular disease and worsening left foot wound.  Unfortunately patient was not taking any antiplatelets, she was evaluated by Infectious Disease, Podiatry and vascular  surgery, she underwent single-vessel runoff and was found to have acceptable circulation.  She was cleared for discharge by multiple consultants, Infectious Disease recommended 14 more days of doxycycline.  She already has wound VAC on left foot.  Home health services were resumed.  Surprisingly patient was not requiring much insulin or antihypertensives.  We changed her insulin regimen as well as antihypertensive regimen and educated patient and .  She was advised to follow-up with her PCP and other outpatient providers.     I have seen the patient on the day of discharge and reviewed the discharge instructions as outlined below. Patient verbalized understanding and is aware to contact primary care physician or return to ED if new or worsening symptoms.         Goals of Care Treatment Preferences:  Code Status: Full Code           Much of the documentation for this visit was completed in the Wound Docs system.  Please see the attached documentation for further details about the patient's care. Scanned under the Media tab.         Alivia Bruno DPM     Vitals:    02/07/23 1212   BP: (!) 157/71   Pulse: 75   Resp: 18   Temp: 98.5 °F (36.9 °C)   PainSc: 0-No pain      Shoe Size:     Past Surgical History:   Procedure Laterality Date    ANGIOGRAPHY OF LOWER EXTREMITY Left 10/18/2022    Procedure: Angiogram Extremity Unilateral;  Surgeon: Ali Khoobehi, MD;  Location: [facility] CATH/EP LAB;  Service: Vascular;  Laterality: Left;    AV FISTULA PLACEMENT Left 8/29/2022    Procedure: CREATION, AV FISTULA;  Surgeon: Ali Khoobehi, MD;  Location: [facility] OR;  Service: Vascular;  Laterality: Left;    BONE BIOPSY Left 8/24/2022    Procedure: Biopsy-Bone;  Surgeon: Porfirio Ponce DPM;  Location: [facility] OR;  Service: Podiatry;  Laterality: Left;    COLONOSCOPY N/A 10/5/2016    Procedure: COLONOSCOPY;  Surgeon: Pillo Chanel MD;  Location: [facility] ENDO;  Service: Endoscopy;  Laterality: N/A;    COLONOSCOPY N/A 4/26/2022     Procedure: COLONOSCOPY;  Surgeon: Saravanan Rachel MD;  Location: [facility] ENDO;  Service: Endoscopy;  Laterality: N/A;    FISTULOGRAM Left 8/24/2022    Procedure: Fistulogram;  Surgeon: Ali Khoobehi, MD;  Location: [facility] CATH/EP LAB;  Service: Vascular;  Laterality: Left;    HERNIA REPAIR Bilateral 11/22/2016    inguinal    HYSTERECTOMY      INCISION AND DRAINAGE FOOT Left 5/13/2022    Procedure: INCISION AND DRAINAGE, FOOT;  Surgeon: Ricardo Bruno DPM;  Location: [facility] OR;  Service: Podiatry;  Laterality: Left;    OOPHORECTOMY      THROMBECTOMY, AV FISTULA, UPPER EXTREMITY, PERCUTANEOUS  8/24/2022    Procedure: THROMBECTOMY, AV FISTULA, UPPER EXTREMITY, PERCUTANEOUS;  Surgeon: Ali Khoobehi, MD;  Location: [facility] CATH/EP LAB;  Service: Vascular;;    TOE AMPUTATION Left 8/26/2022    Procedure: AMPUTATION, TOE;  Surgeon: Porfirio Ponce DPM;  Location: [facility] OR;  Service: Podiatry;  Laterality: Left;     Past Medical History:   Diagnosis Date    A-fib     resolved per patient    Anemia due to end stage renal disease 6/30/2022    Arthritis     Bronchitis     Cardiovascular event risk, ASCVD 10-year risk 6.7% 10/15/2016    Diabetes mellitus type II     Diabetic foot infection     Diabetic ulcer of left midfoot 05/05/2022    Disorder of kidney and ureter     Encounter for blood transfusion     ESRD (end stage renal disease) 05/18/2018    MANJINDER (generalized anxiety disorder) 10/25/2016    History of colon polyps 11/02/2016    Hyperlipidemia     Hypertension     Stroke      Family History   Problem Relation Age of Onset    Hyperlipidemia Mother     Hypertension Mother     Hypertension Father     Diabetes Father     Diabetes Sister     Diabetes Sister     Diabetes Maternal Aunt     Diabetes Maternal Grandmother     Heart disease Maternal Grandmother     Cancer Paternal Grandmother     Breast cancer Neg Hx     Colon cancer Neg Hx     Ovarian cancer Neg Hx         Social History:   Marital Status:   Alcohol History:   reports no history of alcohol use.  Tobacco History:  reports that she has never smoked. She has never used smokeless tobacco.  Drug History:  reports no history of drug use.    Review of patient's allergies indicates:   Allergen Reactions    Dilaudid [hydromorphone] Nausea And Vomiting    Chlorhexidine Itching    Lortab [hydrocodone-acetaminophen] Itching       Current Outpatient Medications   Medication Sig Dispense Refill    acetaminophen (TYLENOL) 325 MG tablet Take 325 mg by mouth every 6 (six) hours.      ALPRAZolam (XANAX) 0.5 MG tablet Take 1 tablet by mouth.      atorvastatin (LIPITOR) 80 MG tablet Take 1 tablet (80 mg total) by mouth once daily. 90 tablet 3    azelastine (ASTELIN) 137 mcg (0.1 %) nasal spray 1 spray (137 mcg total) by Nasal route 2 (two) times daily. 30 mL 3    blood sugar diagnostic Strp To check BG 3 times daily, to use with insurance preferred meter 100 strip 1    blood sugar diagnostic Strp To check BG 4 times daily, to use with insurance preferred meter 450 strip 3    blood-glucose meter kit To check BG 3 times daily, to use with insurance preferred meter 1 each 0    cetirizine (ZYRTEC) 10 MG tablet Take 1 tablet (10 mg total) by mouth every other day. (Patient not taking: Reported on 1/12/2023) 45 tablet 3    clopidogreL (PLAVIX) 75 mg tablet Take 1 tablet (75 mg total) by mouth once daily. 90 tablet 3    doxycycline (VIBRAMYCIN) 100 MG Cap Take 1 capsule (100 mg total) by mouth every 12 (twelve) hours. for 14 days 28 capsule 0    epoetin chris-epbx (RETACRIT) 4,000 unit/mL injection Inject 1.11 mLs (4,440 Units total) into the skin every Mon, Wed, Fri.      fluticasone propionate (FLONASE) 50 mcg/actuation nasal spray 2 sprays (100 mcg total) by Each Nostril route once daily. 16 g 3    gabapentin (NEURONTIN) 100 MG capsule Take 1 capsule (100 mg total) by mouth 2 (two) times daily. 180 capsule 3    hydrALAZINE (APRESOLINE) 25 MG tablet Take 1 tablet (25 mg total) by mouth 2 (two)  "times daily as needed (Systolic blood pressure > 170 mm Hg). (Patient not taking: Reported on 1/12/2023)      insulin detemir U-100 (LEVEMIR FLEXTOUCH U-100 INSULN) 100 unit/mL (3 mL) InPn pen INJECT 6 UNITS SUBCUTANEOUSLY IN THE EVENING 15 mL 3    insulin lispro (HUMALOG KWIKPEN INSULIN) 100 unit/mL pen Inject 5 Units into the skin 3 (three) times daily with meals. 4.5 mL 11    lancets [facility] To check BG 3 times daily, to use with insurance preferred meter 100 each 1    minoxidiL (LONITEN) 10 MG Tab Take 10 mg by mouth 2 (two) times daily.      multivitamin (THERAGRAN) per tablet Take 1 tablet by mouth.      pen needle, diabetic (BD ULTRA-FINE ROBERT PEN NEEDLE) 32 gauge x 5/32" Ndle 1 pen by Misc.(Non-Drug; Combo Route) route 4 (four) times daily. To use 4 times per day with insulin injections. 100 each 1    sucroferric oxyhydroxide (VELPHORO) 500 mg Chew Take 3 tablets by mouth.       No current facility-administered medications for this visit.       Review of Systems   Constitutional:  Negative for chills, fatigue, fever and unexpected weight change.   HENT:  Negative for hearing loss and trouble swallowing.    Eyes:  Negative for photophobia and visual disturbance.   Respiratory:  Negative for cough, shortness of breath and wheezing.    Cardiovascular:  Negative for chest pain, palpitations and leg swelling.   Gastrointestinal:  Negative for abdominal pain and nausea.   Genitourinary:  Negative for dysuria and frequency.   Musculoskeletal:  Negative for arthralgias, back pain, gait problem, joint swelling and myalgias.   Skin:  Positive for wound. Negative for rash.   Neurological:  Negative for tremors, seizures, weakness, numbness and headaches.   Hematological:  Does not bruise/bleed easily.       Objective:        Physical Exam:   Foot Exam  Physical Exam  Ortho/SPM Exam     Imaging:            Assessment:       1. Heel ulcer, left, with necrosis of muscle    2. Peripheral vascular disease    3. Type 2 diabetes " mellitus with hypoglycemia unawareness    4. Ulcer of left foot with fat layer exposed    5. Diabetic ulcer of left midfoot associated with type 2 diabetes mellitus, with necrosis of muscle    6. History of amputation of left foot    7. Ulcer of left foot with necrosis of muscle    8. Decreased pedal pulses    9. Type 2 diabetes mellitus with chronic kidney disease on chronic dialysis, with long-term current use of insulin    10. At risk for readmission to hospital    11. Difficulty in walking, not elsewhere classified    12. At high risk for inadequate nutritional intake    13. Type 2 diabetes mellitus with hypoglycemia and coma, with long-term current use of insulin    14. ESRD (end stage renal disease)      Plan:   Heel ulcer, left, with necrosis of muscle    Peripheral vascular disease    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    Ulcer of left foot with fat layer exposed    Diabetic ulcer of left midfoot associated with type 2 diabetes mellitus, with necrosis of muscle    History of amputation of left foot    Ulcer of left foot with necrosis of muscle    Decreased pedal pulses    Type 2 diabetes mellitus with chronic kidney disease on chronic dialysis, with long-term current use of insulin    At risk for readmission to hospital    Difficulty in walking, not elsewhere classified    At high risk for inadequate nutritional intake    Type 2 diabetes mellitus with hypoglycemia and coma, with long-term current use of insulin    ESRD (end stage renal disease)      Follow up in about 1 week (around 2/14/2023).    Procedures          Counseling:     I provided patient education verbally regarding:   Patient diagnosis, treatment options, as well as alternatives, risks, and benefits.     This note was created using Dragon voice recognition software that occasionally misinterpreted phrases or words.

## 2023-02-08 ENCOUNTER — TELEPHONE (OUTPATIENT)
Dept: FAMILY MEDICINE | Facility: CLINIC | Age: 58
End: 2023-02-08
Payer: MEDICARE

## 2023-02-08 NOTE — TELEPHONE ENCOUNTER
----- Message from Gaby Branham sent at 2/8/2023  2:35 PM CST -----  Regarding: WHEELCHAIR ORDER  Contact: Kimberly - [phone] - [place]  Type: Patient Call Back         Who called: Kimberly - [phone] - [place]         What is the request in detail: calling to see if a wheelchair could be ordered per patient's request; please order via [employer]; please advise          Best call back number: Dialysis - [phone] Maharaj ; will be avail tmrw around 7:30am         Additional Information:   [facility] Phone: [phone]  [employer] fax [phone]           Thank You

## 2023-02-13 ENCOUNTER — DOCUMENT SCAN (OUTPATIENT)
Dept: HOME HEALTH SERVICES | Facility: HOSPITAL | Age: 58
End: 2023-02-13
Payer: MEDICARE

## 2023-02-14 ENCOUNTER — OFFICE VISIT (OUTPATIENT)
Dept: WOUND CARE | Facility: HOSPITAL | Age: 58
End: 2023-02-14
Attending: PODIATRIST
Payer: MEDICARE

## 2023-02-14 ENCOUNTER — TELEPHONE (OUTPATIENT)
Dept: WOUND CARE | Facility: HOSPITAL | Age: 58
End: 2023-02-14
Payer: MEDICARE

## 2023-02-14 VITALS
RESPIRATION RATE: 20 BRPM | TEMPERATURE: 98 F | HEART RATE: 77 BPM | DIASTOLIC BLOOD PRESSURE: 77 MMHG | SYSTOLIC BLOOD PRESSURE: 183 MMHG

## 2023-02-14 DIAGNOSIS — E11.641 TYPE 2 DIABETES MELLITUS WITH HYPOGLYCEMIA AND COMA, WITH LONG-TERM CURRENT USE OF INSULIN: ICD-10-CM

## 2023-02-14 DIAGNOSIS — E11.649 TYPE 2 DIABETES MELLITUS WITH HYPOGLYCEMIA UNAWARENESS: ICD-10-CM

## 2023-02-14 DIAGNOSIS — Z79.4 TYPE 2 DIABETES MELLITUS WITH HYPOGLYCEMIA AND COMA, WITH LONG-TERM CURRENT USE OF INSULIN: ICD-10-CM

## 2023-02-14 DIAGNOSIS — Z99.2 TYPE 2 DIABETES MELLITUS WITH CHRONIC KIDNEY DISEASE ON CHRONIC DIALYSIS, WITH LONG-TERM CURRENT USE OF INSULIN: ICD-10-CM

## 2023-02-14 DIAGNOSIS — R09.89 DECREASED PEDAL PULSES: ICD-10-CM

## 2023-02-14 DIAGNOSIS — Z89.432 HISTORY OF AMPUTATION OF LEFT FOOT: Primary | ICD-10-CM

## 2023-02-14 DIAGNOSIS — E11.22 TYPE 2 DIABETES MELLITUS WITH CHRONIC KIDNEY DISEASE ON CHRONIC DIALYSIS, WITH LONG-TERM CURRENT USE OF INSULIN: ICD-10-CM

## 2023-02-14 DIAGNOSIS — L97.423 HEEL ULCER, LEFT, WITH NECROSIS OF MUSCLE: Primary | ICD-10-CM

## 2023-02-14 DIAGNOSIS — E11.621 DIABETIC ULCER OF LEFT MIDFOOT ASSOCIATED WITH TYPE 2 DIABETES MELLITUS, WITH NECROSIS OF MUSCLE: ICD-10-CM

## 2023-02-14 DIAGNOSIS — Z91.89 AT RISK FOR READMISSION TO HOSPITAL: ICD-10-CM

## 2023-02-14 DIAGNOSIS — N18.6 ESRD (END STAGE RENAL DISEASE): ICD-10-CM

## 2023-02-14 DIAGNOSIS — R26.2 DIFFICULTY IN WALKING, NOT ELSEWHERE CLASSIFIED: ICD-10-CM

## 2023-02-14 DIAGNOSIS — L97.423 DIABETIC ULCER OF LEFT MIDFOOT ASSOCIATED WITH TYPE 2 DIABETES MELLITUS, WITH NECROSIS OF MUSCLE: ICD-10-CM

## 2023-02-14 DIAGNOSIS — Z87.2 HISTORY OF FOOT ULCER: ICD-10-CM

## 2023-02-14 DIAGNOSIS — Z91.89 AT HIGH RISK FOR INADEQUATE NUTRITIONAL INTAKE: ICD-10-CM

## 2023-02-14 DIAGNOSIS — N18.6 TYPE 2 DIABETES MELLITUS WITH CHRONIC KIDNEY DISEASE ON CHRONIC DIALYSIS, WITH LONG-TERM CURRENT USE OF INSULIN: ICD-10-CM

## 2023-02-14 DIAGNOSIS — Z86.73 HISTORY OF TIA (TRANSIENT ISCHEMIC ATTACK): ICD-10-CM

## 2023-02-14 DIAGNOSIS — R60.0 BILATERAL LOWER EXTREMITY EDEMA: ICD-10-CM

## 2023-02-14 DIAGNOSIS — Z89.432 HISTORY OF AMPUTATION OF LEFT FOOT: ICD-10-CM

## 2023-02-14 DIAGNOSIS — Z79.4 TYPE 2 DIABETES MELLITUS WITH CHRONIC KIDNEY DISEASE ON CHRONIC DIALYSIS, WITH LONG-TERM CURRENT USE OF INSULIN: ICD-10-CM

## 2023-02-14 DIAGNOSIS — L97.523 ULCER OF LEFT FOOT WITH NECROSIS OF MUSCLE: ICD-10-CM

## 2023-02-14 DIAGNOSIS — L97.522 ULCER OF LEFT FOOT WITH FAT LAYER EXPOSED: ICD-10-CM

## 2023-02-14 DIAGNOSIS — I73.9 PERIPHERAL VASCULAR DISEASE: ICD-10-CM

## 2023-02-14 PROCEDURE — 4010F PR ACE/ARB THEARPY RXD/TAKEN: ICD-10-PCS | Mod: CPTII,,, | Performed by: PODIATRIST

## 2023-02-14 PROCEDURE — 3077F SYST BP >= 140 MM HG: CPT | Mod: CPTII,,, | Performed by: PODIATRIST

## 2023-02-14 PROCEDURE — 11042 DBRDMT SUBQ TIS 1ST 20SQCM/<: CPT | Performed by: PODIATRIST

## 2023-02-14 PROCEDURE — 1159F MED LIST DOCD IN RCRD: CPT | Mod: CPTII,,, | Performed by: PODIATRIST

## 2023-02-14 PROCEDURE — 1160F PR REVIEW ALL MEDS BY PRESCRIBER/CLIN PHARMACIST DOCUMENTED: ICD-10-PCS | Mod: CPTII,,, | Performed by: PODIATRIST

## 2023-02-14 PROCEDURE — 11042 DBRDMT SUBQ TIS 1ST 20SQCM/<: CPT | Mod: ,,, | Performed by: PODIATRIST

## 2023-02-14 PROCEDURE — 4010F ACE/ARB THERAPY RXD/TAKEN: CPT | Mod: CPTII,,, | Performed by: PODIATRIST

## 2023-02-14 PROCEDURE — 3077F PR MOST RECENT SYSTOLIC BLOOD PRESSURE >= 140 MM HG: ICD-10-PCS | Mod: CPTII,,, | Performed by: PODIATRIST

## 2023-02-14 PROCEDURE — 3078F DIAST BP <80 MM HG: CPT | Mod: CPTII,,, | Performed by: PODIATRIST

## 2023-02-14 PROCEDURE — 99214 PR OFFICE/OUTPT VISIT, EST, LEVL IV, 30-39 MIN: ICD-10-PCS | Mod: 25,,, | Performed by: PODIATRIST

## 2023-02-14 PROCEDURE — 99214 OFFICE O/P EST MOD 30 MIN: CPT | Mod: 25,,, | Performed by: PODIATRIST

## 2023-02-14 PROCEDURE — 11042 PR DEBRIDEMENT, SKIN, SUB-Q TISSUE,=<20 SQ CM: ICD-10-PCS | Mod: ,,, | Performed by: PODIATRIST

## 2023-02-14 PROCEDURE — 3078F PR MOST RECENT DIASTOLIC BLOOD PRESSURE < 80 MM HG: ICD-10-PCS | Mod: CPTII,,, | Performed by: PODIATRIST

## 2023-02-14 PROCEDURE — 1160F RVW MEDS BY RX/DR IN RCRD: CPT | Mod: CPTII,,, | Performed by: PODIATRIST

## 2023-02-14 PROCEDURE — 1159F PR MEDICATION LIST DOCUMENTED IN MEDICAL RECORD: ICD-10-PCS | Mod: CPTII,,, | Performed by: PODIATRIST

## 2023-02-14 NOTE — PROGRESS NOTES
[address] [facility] [address]  [place] [address]  Phone: [phone]   [phone]    Patient's PCP:Stefano Lopez MD  Referring Provider: No ref. provider found    Subjective:      Chief Complaint:: Wound Care, Diabetes, Non-healing Wound, Pressure Ulcer, Wound Check, and Diabetic Foot Ulcer (left plantar foot diabetic foot ulcer and left heel diabetic foot ulcer)    HPI    Patient presents for follow-up of left plantar foot diabetic foot ulcer and left heel diabetic foot ulcer.     Please see Wound docs for full assessment and evaluation of all wounds.     1. Debridement of wound located on left plantar foot. Evaluation and assessment only of left heel wound.See Wound Docs for assessment of wounds and procedure notes  2. Continue taking all medications as prescribed  3. Continue home health dressing changes  4. RTC one week   5. Counseled patient on increasing protein intake, not getting wound wet, keeping dressing clean dry and intact, following a healthy diet, elevating legs when able, removing pressure from wound     Total time spent for E&M 35  Total time for debridement 35 minutes         NPWT is medically necessary for this patient at this time to acheive acceleration of granulation tissue and wound closure. It is my clinical judgement that this cannot be acheived using topical dressing or medications.      Counseling/Education:  I provided patient education verbally regarding:   The aspects of diabetes and how it pertains to the feet. I explained the importance of proper diabetic foot care and how it is essential for the health of their feet.     I discussed the importance of knowing their Hemoglobin A1c and that the level needs to be as close to 6 as possible. I discussed the increase complications of high blood sugar including stroke, blindness, heart attack, kidney failure and loss of limb secondary to neuropathy and PVD.      With neuropathy, beware of any breaks in the skin or redness. These  areas are not recognized early due to the numbness.     I discussed Diabetes, lower back issues, metabolic disorders, systemic causes, chemotherapy, vitamin deficiency, heavy metal exposure, as some of the causes. I also explained that as much as 40% of the time we can not find a cause. I discussed different treatments available to control the symptoms but which may not cure the problem.         Counseled patient on the aspects of diabetes and how it pertains to the feet.  I explained the importance of proper diabetic foot care and how it is essential for the health of their feet.        Shoe inspection. Patient instructed on proper foot hygeine. We discussed wearing proper shoe gear, daily foot inspections, never walking without protective shoe gear, never putting sharp instruments to feet, routine podiatric nail visits every 2-3 months.       Patient should call the office immediately if any signs of infection, such as fever, chills, sweats, increased redness or pain.     Patient was instructed to call the clinic or go to the emergency department if their symptoms do not improve, worsens, or if new symptoms develop.  Patient was advised that if any increased swelling, pain, or numbness arise to go immediately to the ED. Patient knows to call any time if an emergency arises. Shared decision making occurred and patient verbalized understanding in agreement with this plan.      I counseled the patient on their conditions, their implications and medical management.     >50% of this > 60 minute visit was spent face to face educating/counseling the patient     I spent a total of 60 minutes on the day of the visit.This includes face to face time and non-face to face time preparing to see the patient (eg, review of tests), obtaining and/or reviewing separately obtained history, documenting clinical information in the electronic or other health record, independently interpreting results and communicating results to the  patient/family/caregiver, or care coordinator.        Patient Name: Leanna García  MRN: [id number]  Patient Class: IP- Inpatient  Admission Date: 10/17/2022  Hospital Length of Stay: 2 days  Discharge Date and Time: 10/20/2022 11:05 AM  Attending Physician: Precious att. providers found   Discharging Provider: Pietro Shore MD  Primary Care Provider: Stefano Lopez MD        HPI:   57-year-old female with history of DM 2; ESRD on RRT; PAF, HLD, CAD, HTN, CVA, Non-healing DM foot ulcer was sent to ED for admission for angiography of lower extremities with possible surgical intervention for non-healing ulcer. She was recently in hospital for same. She has had osteomyelitis (bone biopsy grew Proteus) with eventual amputation of the 1st toe and partial 1st metatarsal head amputation on 8/26. This was followed by 2 weeks of cefepime as outpatient. Patient was discharged on clopidogrel, but reportedly did not start until a fortnight or so post discharge when she saw Podiatry for follow-up. She has since begun antiplatelet therapy. However, wound is not healing. Is for angiogram in AM with possible intervention pending angiography results. Denied chest discomfort. No SOB.      In ED: Labs reviewed: no leukocytosis with minimal normocytic anemia; trivial hyponatremia with end stage renal dysfunction. COVID negative. CXR reviewed: NAPD. EKG reviewed: sinus, left axis, no acute segments.     Discussed with ED MD: Inpatient admission to med/tele; continue home regimen for DM, ESRD, HTN, HLD and PAF; Nephrology for RRT; Vascular consult; Podiatry consult; Wound care consult; Insulin sliding scale        Procedure(s) (LRB):  Angiogram Extremity Unilateral (Left)       Hospital Course:   57-year-old with chronic left foot wound referred to emergency room by podiatrist and vascular surgery due to severe peripheral vascular disease and worsening left foot wound.  Unfortunately patient was not taking any antiplatelets, she was  evaluated by Infectious Disease, Podiatry and vascular surgery, she underwent single-vessel runoff and was found to have acceptable circulation.  She was cleared for discharge by multiple consultants, Infectious Disease recommended 14 more days of doxycycline.  She already has wound VAC on left foot.  Home health services were resumed.  Surprisingly patient was not requiring much insulin or antihypertensives.  We changed her insulin regimen as well as antihypertensive regimen and educated patient and .  She was advised to follow-up with her PCP and other outpatient providers.     I have seen the patient on the day of discharge and reviewed the discharge instructions as outlined below. Patient verbalized understanding and is aware to contact primary care physician or return to ED if new or worsening symptoms.         Goals of Care Treatment Preferences:  Code Status: Full Code           Much of the documentation for this visit was completed in the Wound Docs system.  Please see the attached documentation for further details about the patient's care. Scanned under the Media tab.         Alivia Bruno DPM        Vitals:    02/14/23 1049   BP: (!) 183/77   Pulse: 77   Resp: 20   Temp: 98.2 °F (36.8 °C)   PainSc: 0-No pain      Shoe Size:     Past Surgical History:   Procedure Laterality Date    ANGIOGRAPHY OF LOWER EXTREMITY Left 10/18/2022    Procedure: Angiogram Extremity Unilateral;  Surgeon: Ali Khoobehi, MD;  Location: [facility] CATH/EP LAB;  Service: Vascular;  Laterality: Left;    AV FISTULA PLACEMENT Left 8/29/2022    Procedure: CREATION, AV FISTULA;  Surgeon: Ali Khoobehi, MD;  Location: [facility] OR;  Service: Vascular;  Laterality: Left;    BONE BIOPSY Left 8/24/2022    Procedure: Biopsy-Bone;  Surgeon: Porfirio Ponce DPM;  Location: [facility] OR;  Service: Podiatry;  Laterality: Left;    COLONOSCOPY N/A 10/5/2016    Procedure: COLONOSCOPY;  Surgeon: Pillo Chanel MD;  Location: [facility] ENDO;  Service:  Endoscopy;  Laterality: N/A;    COLONOSCOPY N/A 4/26/2022    Procedure: COLONOSCOPY;  Surgeon: Saravanan Rachel MD;  Location: [facility] ENDO;  Service: Endoscopy;  Laterality: N/A;    FISTULOGRAM Left 8/24/2022    Procedure: Fistulogram;  Surgeon: Ali Khoobehi, MD;  Location: [facility] CATH/EP LAB;  Service: Vascular;  Laterality: Left;    HERNIA REPAIR Bilateral 11/22/2016    inguinal    HYSTERECTOMY      INCISION AND DRAINAGE FOOT Left 5/13/2022    Procedure: INCISION AND DRAINAGE, FOOT;  Surgeon: Ricardo Bruno DPM;  Location: [facility] OR;  Service: Podiatry;  Laterality: Left;    OOPHORECTOMY      THROMBECTOMY, AV FISTULA, UPPER EXTREMITY, PERCUTANEOUS  8/24/2022    Procedure: THROMBECTOMY, AV FISTULA, UPPER EXTREMITY, PERCUTANEOUS;  Surgeon: Ali Khoobehi, MD;  Location: [facility] CATH/EP LAB;  Service: Vascular;;    TOE AMPUTATION Left 8/26/2022    Procedure: AMPUTATION, TOE;  Surgeon: Porfirio Ponce DPM;  Location: [facility] OR;  Service: Podiatry;  Laterality: Left;     Past Medical History:   Diagnosis Date    A-fib     resolved per patient    Anemia due to end stage renal disease 6/30/2022    Arthritis     Bronchitis     Cardiovascular event risk, ASCVD 10-year risk 6.7% 10/15/2016    Diabetes mellitus type II     Diabetic foot infection     Diabetic ulcer of left midfoot 05/05/2022    Disorder of kidney and ureter     Encounter for blood transfusion     ESRD (end stage renal disease) 05/18/2018    MANJINDER (generalized anxiety disorder) 10/25/2016    History of colon polyps 11/02/2016    Hyperlipidemia     Hypertension     Stroke      Family History   Problem Relation Age of Onset    Hyperlipidemia Mother     Hypertension Mother     Hypertension Father     Diabetes Father     Diabetes Sister     Diabetes Sister     Diabetes Maternal Aunt     Diabetes Maternal Grandmother     Heart disease Maternal Grandmother     Cancer Paternal Grandmother     Breast cancer Neg Hx     Colon cancer Neg Hx     Ovarian cancer Neg Hx          Social History:   Marital Status:   Alcohol History:  reports no history of alcohol use.  Tobacco History:  reports that she has never smoked. She has never used smokeless tobacco.  Drug History:  reports no history of drug use.    Review of patient's allergies indicates:   Allergen Reactions    Dilaudid [hydromorphone] Nausea And Vomiting    Chlorhexidine Itching    Lortab [hydrocodone-acetaminophen] Itching       Current Outpatient Medications   Medication Sig Dispense Refill    acetaminophen (TYLENOL) 325 MG tablet Take 325 mg by mouth every 6 (six) hours.      ALPRAZolam (XANAX) 0.5 MG tablet Take 1 tablet by mouth.      atorvastatin (LIPITOR) 80 MG tablet Take 1 tablet (80 mg total) by mouth once daily. 90 tablet 3    azelastine (ASTELIN) 137 mcg (0.1 %) nasal spray 1 spray (137 mcg total) by Nasal route 2 (two) times daily. 30 mL 3    blood sugar diagnostic Strp To check BG 3 times daily, to use with insurance preferred meter 100 strip 1    blood sugar diagnostic Strp To check BG 4 times daily, to use with insurance preferred meter 450 strip 3    blood-glucose meter kit To check BG 3 times daily, to use with insurance preferred meter 1 each 0    cetirizine (ZYRTEC) 10 MG tablet Take 1 tablet (10 mg total) by mouth every other day. (Patient not taking: Reported on 1/12/2023) 45 tablet 3    clopidogreL (PLAVIX) 75 mg tablet Take 1 tablet (75 mg total) by mouth once daily. 90 tablet 3    epoetin chris-epbx (RETACRIT) 4,000 unit/mL injection Inject 1.11 mLs (4,440 Units total) into the skin every Mon, Wed, Fri.      fluticasone propionate (FLONASE) 50 mcg/actuation nasal spray 2 sprays (100 mcg total) by Each Nostril route once daily. 16 g 3    gabapentin (NEURONTIN) 100 MG capsule Take 1 capsule (100 mg total) by mouth 2 (two) times daily. 180 capsule 3    hydrALAZINE (APRESOLINE) 25 MG tablet Take 1 tablet (25 mg total) by mouth 2 (two) times daily as needed (Systolic blood pressure > 170 mm Hg).  "(Patient not taking: Reported on 1/12/2023)      insulin detemir U-100 (LEVEMIR FLEXTOUCH U-100 INSULN) 100 unit/mL (3 mL) InPn pen INJECT 6 UNITS SUBCUTANEOUSLY IN THE EVENING 15 mL 3    insulin lispro (HUMALOG KWIKPEN INSULIN) 100 unit/mL pen Inject 5 Units into the skin 3 (three) times daily with meals. 4.5 mL 11    lancets [facility] To check BG 3 times daily, to use with insurance preferred meter 100 each 1    minoxidiL (LONITEN) 10 MG Tab Take 10 mg by mouth 2 (two) times daily.      multivitamin (THERAGRAN) per tablet Take 1 tablet by mouth.      pen needle, diabetic (BD ULTRA-FINE ROBERT PEN NEEDLE) 32 gauge x 5/32" Ndle 1 pen by Misc.(Non-Drug; Combo Route) route 4 (four) times daily. To use 4 times per day with insulin injections. 100 each 1    sucroferric oxyhydroxide (VELPHORO) 500 mg Chew Take 3 tablets by mouth.       No current facility-administered medications for this visit.       Review of Systems   Constitutional:  Negative for chills, fatigue, fever and unexpected weight change.   HENT:  Negative for hearing loss and trouble swallowing.    Eyes:  Negative for photophobia and visual disturbance.   Respiratory:  Negative for cough, shortness of breath and wheezing.    Cardiovascular:  Negative for chest pain, palpitations and leg swelling.   Gastrointestinal:  Negative for abdominal pain and nausea.   Genitourinary:  Negative for dysuria and frequency.   Musculoskeletal:  Negative for arthralgias, back pain, gait problem, joint swelling and myalgias.   Skin:  Positive for wound. Negative for rash.   Neurological:  Negative for tremors, seizures, weakness, numbness and headaches.   Hematological:  Does not bruise/bleed easily.       Objective:        Physical Exam:   Foot Exam  Physical Exam  Ortho/SPM Exam     Imaging:            Assessment:       1. Heel ulcer, left, with necrosis of muscle    2. 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    3. Ulcer of left foot with fat layer exposed    4. " Diabetic ulcer of left midfoot associated with type 2 diabetes mellitus, with necrosis of muscle    5. Peripheral vascular disease    6. History of amputation of left foot    7. Decreased pedal pulses    8. At risk for readmission to hospital    9. Type 2 diabetes mellitus with chronic kidney disease on chronic dialysis, with long-term current use of insulin    10. Type 2 diabetes mellitus with hypoglycemia and coma, with long-term current use of insulin    11. ESRD (end stage renal disease)    12. At high risk for inadequate nutritional intake    13. Difficulty in walking, not elsewhere classified    14. History of TIA (transient ischemic attack)    15. Bilateral lower extremity edema    16. Ulcer of left foot with necrosis of muscle      Plan:   Heel ulcer, left, with necrosis of muscle    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    Ulcer of left foot with fat layer exposed    Diabetic ulcer of left midfoot associated with type 2 diabetes mellitus, with necrosis of muscle    Peripheral vascular disease    History of amputation of left foot    Decreased pedal pulses    At risk for readmission to hospital    Type 2 diabetes mellitus with chronic kidney disease on chronic dialysis, with long-term current use of insulin    Type 2 diabetes mellitus with hypoglycemia and coma, with long-term current use of insulin    ESRD (end stage renal disease)    At high risk for inadequate nutritional intake    Difficulty in walking, not elsewhere classified    History of TIA (transient ischemic attack)    Bilateral lower extremity edema    Ulcer of left foot with necrosis of muscle      Follow up in about 2 weeks (around 2/28/2023).    Procedures          Counseling:     I provided patient education verbally regarding:   Patient diagnosis, treatment options, as well as alternatives, risks, and benefits.     This note was created using Dragon voice recognition software that occasionally misinterpreted phrases or  words.

## 2023-02-15 NOTE — TELEPHONE ENCOUNTER
Attempted to contact patient, reached ROSI ALEJANDRO informing her that we are sending her diabetic shoe order to [facility] and provided their number.       Also sending patient a BONESUPPORTt message.

## 2023-02-16 ENCOUNTER — EXTERNAL HOME HEALTH (OUTPATIENT)
Dept: HOME HEALTH SERVICES | Facility: HOSPITAL | Age: 58
End: 2023-02-16
Payer: MEDICARE

## 2023-02-28 ENCOUNTER — OFFICE VISIT (OUTPATIENT)
Dept: WOUND CARE | Facility: HOSPITAL | Age: 58
End: 2023-02-28
Attending: PODIATRIST
Payer: MEDICARE

## 2023-02-28 VITALS
WEIGHT: 175 LBS | BODY MASS INDEX: 25.92 KG/M2 | SYSTOLIC BLOOD PRESSURE: 183 MMHG | DIASTOLIC BLOOD PRESSURE: 111 MMHG | HEART RATE: 83 BPM | HEIGHT: 69 IN | TEMPERATURE: 98 F | RESPIRATION RATE: 18 BRPM

## 2023-02-28 DIAGNOSIS — L97.423 HEEL ULCER, LEFT, WITH NECROSIS OF MUSCLE: Primary | ICD-10-CM

## 2023-02-28 DIAGNOSIS — L97.522 ULCER OF LEFT FOOT WITH FAT LAYER EXPOSED: ICD-10-CM

## 2023-02-28 DIAGNOSIS — L97.423 DIABETIC ULCER OF LEFT MIDFOOT ASSOCIATED WITH TYPE 2 DIABETES MELLITUS, WITH NECROSIS OF MUSCLE: ICD-10-CM

## 2023-02-28 DIAGNOSIS — Z79.4 TYPE 2 DIABETES MELLITUS WITH HYPOGLYCEMIA AND COMA, WITH LONG-TERM CURRENT USE OF INSULIN: ICD-10-CM

## 2023-02-28 DIAGNOSIS — E11.621 DIABETIC ULCER OF LEFT MIDFOOT ASSOCIATED WITH TYPE 2 DIABETES MELLITUS, WITH NECROSIS OF MUSCLE: ICD-10-CM

## 2023-02-28 DIAGNOSIS — E11.22 TYPE 2 DIABETES MELLITUS WITH CHRONIC KIDNEY DISEASE ON CHRONIC DIALYSIS, WITH LONG-TERM CURRENT USE OF INSULIN: ICD-10-CM

## 2023-02-28 DIAGNOSIS — I73.9 PERIPHERAL VASCULAR DISEASE: ICD-10-CM

## 2023-02-28 DIAGNOSIS — Z79.4 TYPE 2 DIABETES MELLITUS WITH CHRONIC KIDNEY DISEASE ON CHRONIC DIALYSIS, WITH LONG-TERM CURRENT USE OF INSULIN: ICD-10-CM

## 2023-02-28 DIAGNOSIS — Z91.89 AT RISK FOR READMISSION TO HOSPITAL: ICD-10-CM

## 2023-02-28 DIAGNOSIS — N18.6 TYPE 2 DIABETES MELLITUS WITH CHRONIC KIDNEY DISEASE ON CHRONIC DIALYSIS, WITH LONG-TERM CURRENT USE OF INSULIN: ICD-10-CM

## 2023-02-28 DIAGNOSIS — R26.2 DIFFICULTY IN WALKING, NOT ELSEWHERE CLASSIFIED: ICD-10-CM

## 2023-02-28 DIAGNOSIS — E11.649 TYPE 2 DIABETES MELLITUS WITH HYPOGLYCEMIA UNAWARENESS: ICD-10-CM

## 2023-02-28 DIAGNOSIS — Z86.73 HISTORY OF TIA (TRANSIENT ISCHEMIC ATTACK): ICD-10-CM

## 2023-02-28 DIAGNOSIS — Z91.89 AT HIGH RISK FOR INADEQUATE NUTRITIONAL INTAKE: ICD-10-CM

## 2023-02-28 DIAGNOSIS — E11.641 TYPE 2 DIABETES MELLITUS WITH HYPOGLYCEMIA AND COMA, WITH LONG-TERM CURRENT USE OF INSULIN: ICD-10-CM

## 2023-02-28 DIAGNOSIS — Z87.2 HISTORY OF FOOT ULCER: ICD-10-CM

## 2023-02-28 DIAGNOSIS — L97.523 ULCER OF LEFT FOOT WITH NECROSIS OF MUSCLE: ICD-10-CM

## 2023-02-28 DIAGNOSIS — Z89.432 HISTORY OF AMPUTATION OF LEFT FOOT: ICD-10-CM

## 2023-02-28 DIAGNOSIS — Z99.2 TYPE 2 DIABETES MELLITUS WITH CHRONIC KIDNEY DISEASE ON CHRONIC DIALYSIS, WITH LONG-TERM CURRENT USE OF INSULIN: ICD-10-CM

## 2023-02-28 DIAGNOSIS — R60.0 BILATERAL LOWER EXTREMITY EDEMA: ICD-10-CM

## 2023-02-28 DIAGNOSIS — N18.6 ESRD (END STAGE RENAL DISEASE): ICD-10-CM

## 2023-02-28 DIAGNOSIS — R09.89 DECREASED PEDAL PULSES: ICD-10-CM

## 2023-02-28 PROCEDURE — 3077F PR MOST RECENT SYSTOLIC BLOOD PRESSURE >= 140 MM HG: ICD-10-PCS | Mod: CPTII,,, | Performed by: PODIATRIST

## 2023-02-28 PROCEDURE — 1160F RVW MEDS BY RX/DR IN RCRD: CPT | Mod: CPTII,,, | Performed by: PODIATRIST

## 2023-02-28 PROCEDURE — 1159F PR MEDICATION LIST DOCUMENTED IN MEDICAL RECORD: ICD-10-PCS | Mod: CPTII,,, | Performed by: PODIATRIST

## 2023-02-28 PROCEDURE — 3077F SYST BP >= 140 MM HG: CPT | Mod: CPTII,,, | Performed by: PODIATRIST

## 2023-02-28 PROCEDURE — 3008F PR BODY MASS INDEX (BMI) DOCUMENTED: ICD-10-PCS | Mod: CPTII,,, | Performed by: PODIATRIST

## 2023-02-28 PROCEDURE — 99213 OFFICE O/P EST LOW 20 MIN: CPT | Performed by: PODIATRIST

## 2023-02-28 PROCEDURE — 1159F MED LIST DOCD IN RCRD: CPT | Mod: CPTII,,, | Performed by: PODIATRIST

## 2023-02-28 PROCEDURE — 1160F PR REVIEW ALL MEDS BY PRESCRIBER/CLIN PHARMACIST DOCUMENTED: ICD-10-PCS | Mod: CPTII,,, | Performed by: PODIATRIST

## 2023-02-28 PROCEDURE — 99214 OFFICE O/P EST MOD 30 MIN: CPT | Mod: ,,, | Performed by: PODIATRIST

## 2023-02-28 PROCEDURE — 99214 PR OFFICE/OUTPT VISIT, EST, LEVL IV, 30-39 MIN: ICD-10-PCS | Mod: ,,, | Performed by: PODIATRIST

## 2023-02-28 PROCEDURE — 4010F PR ACE/ARB THEARPY RXD/TAKEN: ICD-10-PCS | Mod: CPTII,,, | Performed by: PODIATRIST

## 2023-02-28 PROCEDURE — 3008F BODY MASS INDEX DOCD: CPT | Mod: CPTII,,, | Performed by: PODIATRIST

## 2023-02-28 PROCEDURE — 4010F ACE/ARB THERAPY RXD/TAKEN: CPT | Mod: CPTII,,, | Performed by: PODIATRIST

## 2023-02-28 PROCEDURE — 3080F PR MOST RECENT DIASTOLIC BLOOD PRESSURE >= 90 MM HG: ICD-10-PCS | Mod: CPTII,,, | Performed by: PODIATRIST

## 2023-02-28 PROCEDURE — 3080F DIAST BP >= 90 MM HG: CPT | Mod: CPTII,,, | Performed by: PODIATRIST

## 2023-03-02 NOTE — PROGRESS NOTES
[address] [facility] [address]  [place] [address]  Phone: [phone]   [phone]    Patient's PCP:Stefano Lopez MD  Referring Provider: Aaareferral Self    Subjective:      Chief Complaint:: Wound Care, Diabetes, Diabetic Foot Ulcer, Non-healing Wound, Pressure Ulcer, and Wound Check    HPI    Patient presents for follow-up of left plantar foot diabetic foot ulcer and left heel diabetic foot ulcer.     Please see Wound docs for full assessment and evaluation of all wounds.     1. See Wound Docs for assessment of wounds and procedure notes  2. Continue taking all medications as prescribed  3. Continue home health dressing changes  4. RTC one week   5. Counseled patient on increasing protein intake, not getting wound wet, keeping dressing clean dry and intact, following a healthy diet, elevating legs when able, removing pressure from wound        Counseling/Education:  I provided patient education verbally regarding:   The aspects of diabetes and how it pertains to the feet. I explained the importance of proper diabetic foot care and how it is essential for the health of their feet.     I discussed the importance of knowing their Hemoglobin A1c and that the level needs to be as close to 6 as possible. I discussed the increase complications of high blood sugar including stroke, blindness, heart attack, kidney failure and loss of limb secondary to neuropathy and PVD.      With neuropathy, beware of any breaks in the skin or redness. These areas are not recognized early due to the numbness.     I discussed Diabetes, lower back issues, metabolic disorders, systemic causes, chemotherapy, vitamin deficiency, heavy metal exposure, as some of the causes. I also explained that as much as 40% of the time we can not find a cause. I discussed different treatments available to control the symptoms but which may not cure the problem.         Counseled patient on the aspects of diabetes and how it pertains to the feet.   I explained the importance of proper diabetic foot care and how it is essential for the health of their feet.        Shoe inspection. Patient instructed on proper foot hygeine. We discussed wearing proper shoe gear, daily foot inspections, never walking without protective shoe gear, never putting sharp instruments to feet, routine podiatric nail visits every 2-3 months.       Patient should call the office immediately if any signs of infection, such as fever, chills, sweats, increased redness or pain.     Patient was instructed to call the clinic or go to the emergency department if their symptoms do not improve, worsens, or if new symptoms develop.  Patient was advised that if any increased swelling, pain, or numbness arise to go immediately to the ED. Patient knows to call any time if an emergency arises. Shared decision making occurred and patient verbalized understanding in agreement with this plan.      I counseled the patient on their conditions, their implications and medical management.     >50% of this > 60 minute visit was spent face to face educating/counseling the patient     I spent a total of 60 minutes on the day of the visit.This includes face to face time and non-face to face time preparing to see the patient (eg, review of tests), obtaining and/or reviewing separately obtained history, documenting clinical information in the electronic or other health record, independently interpreting results and communicating results to the patient/family/caregiver, or care coordinator.        Patient Name: Leanna García  MRN: [id number]  Patient Class: IP- Inpatient  Admission Date: 10/17/2022  Hospital Length of Stay: 2 days  Discharge Date and Time: 10/20/2022 11:05 AM  Attending Physician: No att. providers found   Discharging Provider: Pietro Shore MD  Primary Care Provider: Stefano Lopez MD        HPI:   57-year-old female with history of DM 2; ESRD on RRT; PAF, HLD, CAD, HTN, CVA, Non-healing DM  foot ulcer was sent to ED for admission for angiography of lower extremities with possible surgical intervention for non-healing ulcer. She was recently in hospital for same. She has had osteomyelitis (bone biopsy grew Proteus) with eventual amputation of the 1st toe and partial 1st metatarsal head amputation on 8/26. This was followed by 2 weeks of cefepime as outpatient. Patient was discharged on clopidogrel, but reportedly did not start until a fortnight or so post discharge when she saw Podiatry for follow-up. She has since begun antiplatelet therapy. However, wound is not healing. Is for angiogram in AM with possible intervention pending angiography results. Denied chest discomfort. No SOB.      In ED: Labs reviewed: no leukocytosis with minimal normocytic anemia; trivial hyponatremia with end stage renal dysfunction. COVID negative. CXR reviewed: NAPD. EKG reviewed: sinus, left axis, no acute segments.     Discussed with ED MD: Inpatient admission to med/tele; continue home regimen for DM, ESRD, HTN, HLD and PAF; Nephrology for RRT; Vascular consult; Podiatry consult; Wound care consult; Insulin sliding scale        Procedure(s) (LRB):  Angiogram Extremity Unilateral (Left)       Hospital Course:   57-year-old with chronic left foot wound referred to emergency room by podiatrist and vascular surgery due to severe peripheral vascular disease and worsening left foot wound.  Unfortunately patient was not taking any antiplatelets, she was evaluated by Infectious Disease, Podiatry and vascular surgery, she underwent single-vessel runoff and was found to have acceptable circulation.  She was cleared for discharge by multiple consultants, Infectious Disease recommended 14 more days of doxycycline.  She already has wound VAC on left foot.  Home health services were resumed.  Surprisingly patient was not requiring much insulin or antihypertensives.  We changed her insulin regimen as well as antihypertensive regimen and  "educated patient and .  She was advised to follow-up with her PCP and other outpatient providers.     I have seen the patient on the day of discharge and reviewed the discharge instructions as outlined below. Patient verbalized understanding and is aware to contact primary care physician or return to ED if new or worsening symptoms.         Goals of Care Treatment Preferences:  Code Status: Full Code           Much of the documentation for this visit was completed in the Wound Docs system.  Please see the attached documentation for further details about the patient's care. Scanned under the Media tab.         Alivia Bruno DPM        Vitals:    02/28/23 1532   BP: (!) 183/111   Pulse: 83   Resp: 18   Temp: 98.3 °F (36.8 °C)   Weight: 79.4 kg (175 lb)   Height: 5' 9" (1.753 m)   PainSc: 0-No pain      Shoe Size:     Past Surgical History:   Procedure Laterality Date    ANGIOGRAPHY OF LOWER EXTREMITY Left 10/18/2022    Procedure: Angiogram Extremity Unilateral;  Surgeon: Ali Khoobehi, MD;  Location: [facility] CATH/EP LAB;  Service: Vascular;  Laterality: Left;    AV FISTULA PLACEMENT Left 8/29/2022    Procedure: CREATION, AV FISTULA;  Surgeon: Ali Khoobehi, MD;  Location: [facility] OR;  Service: Vascular;  Laterality: Left;    BONE BIOPSY Left 8/24/2022    Procedure: Biopsy-Bone;  Surgeon: Porfirio Ponce DPM;  Location: [facility] OR;  Service: Podiatry;  Laterality: Left;    COLONOSCOPY N/A 10/5/2016    Procedure: COLONOSCOPY;  Surgeon: Pillo Chanel MD;  Location: [facility] ENDO;  Service: Endoscopy;  Laterality: N/A;    COLONOSCOPY N/A 4/26/2022    Procedure: COLONOSCOPY;  Surgeon: Saravanan Rachel MD;  Location: [facility] ENDO;  Service: Endoscopy;  Laterality: N/A;    FISTULOGRAM Left 8/24/2022    Procedure: Fistulogram;  Surgeon: Ali Khoobehi, MD;  Location: [facility] CATH/EP LAB;  Service: Vascular;  Laterality: Left;    HERNIA REPAIR Bilateral 11/22/2016    inguinal    HYSTERECTOMY      INCISION AND DRAINAGE FOOT Left " 5/13/2022    Procedure: INCISION AND DRAINAGE, FOOT;  Surgeon: Ricardo Bruno DPM;  Location: [facility] OR;  Service: Podiatry;  Laterality: Left;    OOPHORECTOMY      THROMBECTOMY, AV FISTULA, UPPER EXTREMITY, PERCUTANEOUS  8/24/2022    Procedure: THROMBECTOMY, AV FISTULA, UPPER EXTREMITY, PERCUTANEOUS;  Surgeon: Ali Khoobehi, MD;  Location: [facility] CATH/EP LAB;  Service: Vascular;;    TOE AMPUTATION Left 8/26/2022    Procedure: AMPUTATION, TOE;  Surgeon: Porfirio Ponce DPM;  Location: [facility] OR;  Service: Podiatry;  Laterality: Left;     Past Medical History:   Diagnosis Date    A-fib     resolved per patient    Anemia due to end stage renal disease 6/30/2022    Arthritis     Bronchitis     Cardiovascular event risk, ASCVD 10-year risk 6.7% 10/15/2016    Diabetes mellitus type II     Diabetic foot infection     Diabetic ulcer of left midfoot 05/05/2022    Disorder of kidney and ureter     Encounter for blood transfusion     ESRD (end stage renal disease) 05/18/2018    MANJINDER (generalized anxiety disorder) 10/25/2016    History of colon polyps 11/02/2016    Hyperlipidemia     Hypertension     Stroke      Family History   Problem Relation Age of Onset    Hyperlipidemia Mother     Hypertension Mother     Hypertension Father     Diabetes Father     Diabetes Sister     Diabetes Sister     Diabetes Maternal Aunt     Diabetes Maternal Grandmother     Heart disease Maternal Grandmother     Cancer Paternal Grandmother     Breast cancer Neg Hx     Colon cancer Neg Hx     Ovarian cancer Neg Hx         Social History:   Marital Status:   Alcohol History:  reports no history of alcohol use.  Tobacco History:  reports that she has never smoked. She has never used smokeless tobacco.  Drug History:  reports no history of drug use.    Review of patient's allergies indicates:   Allergen Reactions    Dilaudid [hydromorphone] Nausea And Vomiting    Chlorhexidine Itching    Lortab [hydrocodone-acetaminophen] Itching       Current  Outpatient Medications   Medication Sig Dispense Refill    acetaminophen (TYLENOL) 325 MG tablet Take 325 mg by mouth every 6 (six) hours.      ALPRAZolam (XANAX) 0.5 MG tablet Take 1 tablet by mouth.      atorvastatin (LIPITOR) 80 MG tablet Take 1 tablet (80 mg total) by mouth once daily. 90 tablet 3    azelastine (ASTELIN) 137 mcg (0.1 %) nasal spray 1 spray (137 mcg total) by Nasal route 2 (two) times daily. 30 mL 3    blood sugar diagnostic Strp To check BG 3 times daily, to use with insurance preferred meter 100 strip 1    blood sugar diagnostic Strp To check BG 4 times daily, to use with insurance preferred meter 450 strip 3    blood-glucose meter kit To check BG 3 times daily, to use with insurance preferred meter 1 each 0    cetirizine (ZYRTEC) 10 MG tablet Take 1 tablet (10 mg total) by mouth every other day. (Patient not taking: Reported on 1/12/2023) 45 tablet 3    clopidogreL (PLAVIX) 75 mg tablet Take 1 tablet (75 mg total) by mouth once daily. 90 tablet 3    epoetin chris-epbx (RETACRIT) 4,000 unit/mL injection Inject 1.11 mLs (4,440 Units total) into the skin every Mon, Wed, Fri.      fluticasone propionate (FLONASE) 50 mcg/actuation nasal spray 2 sprays (100 mcg total) by Each Nostril route once daily. 16 g 3    gabapentin (NEURONTIN) 100 MG capsule Take 1 capsule (100 mg total) by mouth 2 (two) times daily. 180 capsule 3    hydrALAZINE (APRESOLINE) 25 MG tablet Take 1 tablet (25 mg total) by mouth 2 (two) times daily as needed (Systolic blood pressure > 170 mm Hg). (Patient not taking: Reported on 1/12/2023)      insulin detemir U-100 (LEVEMIR FLEXTOUCH U-100 INSULN) 100 unit/mL (3 mL) InPn pen INJECT 6 UNITS SUBCUTANEOUSLY IN THE EVENING 15 mL 3    insulin lispro (HUMALOG KWIKPEN INSULIN) 100 unit/mL pen Inject 5 Units into the skin 3 (three) times daily with meals. 4.5 mL 11    lancets Misc To check BG 3 times daily, to use with insurance preferred meter 100 each 1    minoxidiL (LONITEN) 10 MG Tab  "Take 10 mg by mouth 2 (two) times daily.      multivitamin (THERAGRAN) per tablet Take 1 tablet by mouth.      pen needle, diabetic (BD ULTRA-FINE ROBERT PEN NEEDLE) 32 gauge x 5/32" Ndle 1 pen by Misc.(Non-Drug; Combo Route) route 4 (four) times daily. To use 4 times per day with insulin injections. 100 each 1    sucroferric oxyhydroxide (VELPHORO) 500 mg Chew Take 3 tablets by mouth.       No current facility-administered medications for this visit.       Review of Systems   Constitutional:  Negative for chills, fatigue, fever and unexpected weight change.   HENT:  Negative for hearing loss and trouble swallowing.    Eyes:  Negative for photophobia and visual disturbance.   Respiratory:  Negative for cough, shortness of breath and wheezing.    Cardiovascular:  Negative for chest pain, palpitations and leg swelling.   Gastrointestinal:  Negative for abdominal pain and nausea.   Genitourinary:  Negative for dysuria and frequency.   Musculoskeletal:  Negative for arthralgias, back pain, gait problem, joint swelling and myalgias.   Skin:  Positive for wound. Negative for rash.   Neurological:  Negative for tremors, seizures, weakness, numbness and headaches.   Hematological:  Does not bruise/bleed easily.       Objective:        Physical Exam:   Foot Exam  Physical Exam  Ortho/SPM Exam     Imaging:            Assessment:       1. Heel ulcer, left, with necrosis of muscle    2. Ulcer of left foot with necrosis of muscle    3. History of amputation of left foot    4. Type 2 diabetes mellitus with chronic kidney disease on chronic dialysis, with long-term current use of insulin    5. History of foot ulcer    6. Diabetic ulcer of left midfoot associated with type 2 diabetes mellitus, with necrosis of muscle    7. Peripheral vascular disease    8. Ulcer of left foot with fat layer exposed    9. 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    10. At risk for readmission to hospital    11. Decreased pedal pulses  "   12. At high risk for inadequate nutritional intake    13. Difficulty in walking, not elsewhere classified    14. Type 2 diabetes mellitus with hypoglycemia and coma, with long-term current use of insulin    15. ESRD (end stage renal disease)    16. Bilateral lower extremity edema    17. History of TIA (transient ischemic attack)      Plan:   Heel ulcer, left, with necrosis of muscle    Ulcer of left foot with necrosis of muscle    History of amputation of left foot    Type 2 diabetes mellitus with chronic kidney disease on chronic dialysis, with long-term current use of insulin    History of foot ulcer    Diabetic ulcer of left midfoot associated with type 2 diabetes mellitus, with necrosis of muscle    Peripheral vascular disease    Ulcer of left foot with fat layer exposed    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    At risk for readmission to hospital    Decreased pedal pulses    At high risk for inadequate nutritional intake    Difficulty in walking, not elsewhere classified    Type 2 diabetes mellitus with hypoglycemia and coma, with long-term current use of insulin    ESRD (end stage renal disease)    Bilateral lower extremity edema    History of TIA (transient ischemic attack)      Follow up in about 1 week (around 3/7/2023), or if symptoms worsen or fail to improve.    Procedures          Counseling:     I provided patient education verbally regarding:   Patient diagnosis, treatment options, as well as alternatives, risks, and benefits.     This note was created using Dragon voice recognition software that occasionally misinterpreted phrases or words.

## 2023-03-03 ENCOUNTER — DOCUMENT SCAN (OUTPATIENT)
Dept: HOME HEALTH SERVICES | Facility: HOSPITAL | Age: 58
End: 2023-03-03
Payer: MEDICARE

## 2023-03-08 ENCOUNTER — PATIENT MESSAGE (OUTPATIENT)
Dept: ADMINISTRATIVE | Facility: HOSPITAL | Age: 58
End: 2023-03-08
Payer: MEDICARE

## 2023-03-08 NOTE — PLAN OF CARE
02/11/18 1203   Final Note   Assessment Type Final Discharge Note   Discharge Disposition [facility]      [FreeTextEntry1] : Dilshad Lan  several weeks status post PTCI the LAD for unstable angina.  She is presently asymptomatic.  Physical exam is normal and EKG with some very minor nonspecific ST-T wave changes. EKG is today is probably normal with some very minor nonspecific EKG changes.  She is asymptomatic\par \par \par 1.  Ischemic heart disease status post PTCI of the mid LAD for unstable angina.  Patient is asymptomatic with excellent exercise tolerance no exertional dizziness or exertional chest pain\par \par  2.  History of hypertension well-controlled on amlodipine benazepril 10–40\par \par 3.  Mixed hyperlipoidemia on statin LDL should be brought down to below 55\par \par  overall the patient continues to do well clinically.

## 2023-03-09 ENCOUNTER — DOCUMENT SCAN (OUTPATIENT)
Dept: HOME HEALTH SERVICES | Facility: HOSPITAL | Age: 58
End: 2023-03-09
Payer: MEDICARE

## 2023-03-30 ENCOUNTER — PATIENT MESSAGE (OUTPATIENT)
Dept: ADMINISTRATIVE | Facility: HOSPITAL | Age: 58
End: 2023-03-30
Payer: MEDICARE

## 2023-04-04 ENCOUNTER — DOCUMENT SCAN (OUTPATIENT)
Dept: HOME HEALTH SERVICES | Facility: HOSPITAL | Age: 58
End: 2023-04-04
Payer: MEDICARE

## 2023-04-04 NOTE — TELEPHONE ENCOUNTER
----- Message from Aidee Gardner MA sent at 9/27/2018 12:57 PM CDT -----  Contact: patient called  Claudia please call the patient at [phone] she need to talk  to you about her blood work she also need to talk to you about her er visit. Thank you.    Present (15 x15 bpm)

## 2023-04-18 NOTE — PATIENT INSTRUCTIONS
Your Diabetes Foot Care Program    Every day you depend on your feet to keep you moving. But when you have diabetes, your feet need special care. Even a small foot problem can become very serious. So dont take your feet for granted. By working with your diabetes healthcare team, you can learn how to protect your feet and keep them healthy.  Evaluating your feet  An evaluation helps your healthcare provider check the condition of your feet. The evaluation includes a review of your diabetes history and overall health. It may also include a foot exam, X-rays, or other tests. These can help show problems beneath the skin that you cant see or feel.  Medical history  You will be asked about your overall health and any history of foot problems. Youll also discuss your diabetes history, such as whether your blood sugar level has changed over time. It also includes questions about sensations of pain, tingling, pins and needles, or numbness. Your healthcare provider will also want to know if you have high blood pressure and heart disease, or if you smoke. Be sure to mention any medicines (including over-the-counter), supplements, or herbal remedies you take.  Foot exam  A foot exam checks the condition of different parts of your foot. First, your skin and nails are examined for any signs of infection. Blood flow is checked by feeling for the pulses in each foot. You may also have tests to study the nerves in the foot. These include using a small filament (wire) to see how sensitive your feet are. In certain cases, you will be asked to walk a short distance to check for bone, joint, and muscle problems.  Diagnostic tests  If needed, your healthcare provider will suggest certain tests to learn more about your feet. These include:  Doppler tests to measure blood flow in the feet and lower leg.  X-rays, which can show bone or joint problems.  Other imaging tests, such as an MRI (magnetic resonance imaging), bone scan, and CT  (computed tomography) scan. These can help show bone infections.  Other tests, such as vascular tests, which study the blood flow in your feet and legs. You may also have nerve studies to learn how sensitive your feet are.  Creating a foot care program  Based on the evaluation, your healthcare provider will create a foot care program for you. Your program may be as simple as starting a daily self-care routine and changing the types of shoes your wear. It may also involve treating minor foot problems, such as a corn or blister. In some cases, surgery will be needed to treat an infection or mechanical problems, such as hammer toes.  Preventing problems  When you have diabetes, its easier to prevent problems than to treat them later on. So see your healthcare team for regular checkups and foot care. Your healthcare team can also help you learn more about caring for your feet at home. For example, you may be told to avoid walking barefoot. Or you may be told that special footwear is needed to protect your feet.  Have regular checkups  Foot problems can develop quickly. So be sure to follow your healthcare teams schedule for regular checkups. During office visits, take off your shoes and socks as soon as you get in the exam room. Ask your healthcare provider to examine your feet for problems. This will make it easier to find and treat small skin irritations before they get worse. Regular checkups can also help keep track of the blood flow and feeling in your feet. If you have neuropathy (lack of feeling in your feet), you will need to have checkups more often.  Learn about self-care  The more you know about diabetes and your feet, the easier it will be to prevent problems. Members of your healthcare team can teach you how to inspect your feet and teach you to look for warning signs. They can also give you other foot care tips. During office visits, be sure to ask any questions you have.  Date Last Reviewed: 7/1/2016  ©  0064-4670 [employer]. [address] [place] [address], [place] [address]. All rights reserved. This information is not intended as a substitute for professional medical care. Always follow your healthcare professional's instructions.

## 2023-04-20 ENCOUNTER — OFFICE VISIT (OUTPATIENT)
Dept: PODIATRY | Facility: CLINIC | Age: 58
End: 2023-04-20
Payer: MEDICARE

## 2023-04-20 VITALS — BODY MASS INDEX: 25.92 KG/M2 | WEIGHT: 175 LBS | RESPIRATION RATE: 18 BRPM | HEIGHT: 69 IN

## 2023-04-20 DIAGNOSIS — R20.2 PARESTHESIAS: ICD-10-CM

## 2023-04-20 DIAGNOSIS — E11.621 TYPE 2 DIABETES MELLITUS WITH FOOT ULCER, UNSPECIFIED WHETHER LONG TERM INSULIN USE: ICD-10-CM

## 2023-04-20 DIAGNOSIS — E11.42 DIABETIC POLYNEUROPATHY ASSOCIATED WITH TYPE 2 DIABETES MELLITUS: Primary | ICD-10-CM

## 2023-04-20 DIAGNOSIS — R26.2 DIFFICULTY IN WALKING, NOT ELSEWHERE CLASSIFIED: ICD-10-CM

## 2023-04-20 DIAGNOSIS — R23.4 FISSURE IN SKIN OF BOTH FEET: ICD-10-CM

## 2023-04-20 DIAGNOSIS — R09.89 DECREASED PEDAL PULSES: ICD-10-CM

## 2023-04-20 DIAGNOSIS — Z91.89 AT HIGH RISK FOR INADEQUATE NUTRITIONAL INTAKE: ICD-10-CM

## 2023-04-20 DIAGNOSIS — M21.42 PES PLANUS OF BOTH FEET: ICD-10-CM

## 2023-04-20 DIAGNOSIS — Z89.422 HISTORY OF AMPUTATION OF LESSER TOE OF LEFT FOOT: ICD-10-CM

## 2023-04-20 DIAGNOSIS — L97.523 ULCER OF LEFT FOOT WITH NECROSIS OF MUSCLE: ICD-10-CM

## 2023-04-20 DIAGNOSIS — M21.41 PES PLANUS OF BOTH FEET: ICD-10-CM

## 2023-04-20 DIAGNOSIS — E11.621 DIABETIC ULCER OF LEFT MIDFOOT ASSOCIATED WITH TYPE 2 DIABETES MELLITUS, LIMITED TO BREAKDOWN OF SKIN: ICD-10-CM

## 2023-04-20 DIAGNOSIS — L60.2 ONYCHOGRYPHOSIS: ICD-10-CM

## 2023-04-20 DIAGNOSIS — L97.421 DIABETIC ULCER OF LEFT MIDFOOT ASSOCIATED WITH TYPE 2 DIABETES MELLITUS, LIMITED TO BREAKDOWN OF SKIN: ICD-10-CM

## 2023-04-20 DIAGNOSIS — E11.40 TYPE 2 DIABETES MELLITUS WITH DIABETIC NEUROPATHY, UNSPECIFIED WHETHER LONG TERM INSULIN USE: ICD-10-CM

## 2023-04-20 DIAGNOSIS — L97.509 TYPE 2 DIABETES MELLITUS WITH FOOT ULCER, UNSPECIFIED WHETHER LONG TERM INSULIN USE: ICD-10-CM

## 2023-04-20 DIAGNOSIS — M79.672 LEFT FOOT PAIN: ICD-10-CM

## 2023-04-20 DIAGNOSIS — Z87.2 HISTORY OF ULCER OF LOWER EXTREMITY: ICD-10-CM

## 2023-04-20 DIAGNOSIS — B35.3 TINEA PEDIS OF BOTH FEET: ICD-10-CM

## 2023-04-20 DIAGNOSIS — Z89.432 HISTORY OF AMPUTATION OF LEFT FOOT: ICD-10-CM

## 2023-04-20 PROCEDURE — 11042 WOUND DEBRIDEMENT: ICD-10-PCS | Mod: 59,S$GLB,, | Performed by: PODIATRIST

## 2023-04-20 PROCEDURE — 3008F BODY MASS INDEX DOCD: CPT | Mod: CPTII,S$GLB,, | Performed by: PODIATRIST

## 2023-04-20 PROCEDURE — 99214 OFFICE O/P EST MOD 30 MIN: CPT | Mod: 25,S$GLB,, | Performed by: PODIATRIST

## 2023-04-20 PROCEDURE — 4010F ACE/ARB THERAPY RXD/TAKEN: CPT | Mod: CPTII,S$GLB,, | Performed by: PODIATRIST

## 2023-04-20 PROCEDURE — 1160F PR REVIEW ALL MEDS BY PRESCRIBER/CLIN PHARMACIST DOCUMENTED: ICD-10-PCS | Mod: CPTII,S$GLB,, | Performed by: PODIATRIST

## 2023-04-20 PROCEDURE — 4010F PR ACE/ARB THEARPY RXD/TAKEN: ICD-10-PCS | Mod: CPTII,S$GLB,, | Performed by: PODIATRIST

## 2023-04-20 PROCEDURE — 11055 ROUTINE FOOT CARE: ICD-10-PCS | Mod: Q7,S$GLB,, | Performed by: PODIATRIST

## 2023-04-20 PROCEDURE — 11721 DEBRIDE NAIL 6 OR MORE: CPT | Mod: 59,Q7,S$GLB, | Performed by: PODIATRIST

## 2023-04-20 PROCEDURE — 1159F PR MEDICATION LIST DOCUMENTED IN MEDICAL RECORD: ICD-10-PCS | Mod: CPTII,S$GLB,, | Performed by: PODIATRIST

## 2023-04-20 PROCEDURE — 11721 ROUTINE FOOT CARE: ICD-10-PCS | Mod: 59,Q7,S$GLB, | Performed by: PODIATRIST

## 2023-04-20 PROCEDURE — 3008F PR BODY MASS INDEX (BMI) DOCUMENTED: ICD-10-PCS | Mod: CPTII,S$GLB,, | Performed by: PODIATRIST

## 2023-04-20 PROCEDURE — 11042 DBRDMT SUBQ TIS 1ST 20SQCM/<: CPT | Mod: 59,S$GLB,, | Performed by: PODIATRIST

## 2023-04-20 PROCEDURE — 99214 PR OFFICE/OUTPT VISIT, EST, LEVL IV, 30-39 MIN: ICD-10-PCS | Mod: 25,S$GLB,, | Performed by: PODIATRIST

## 2023-04-20 PROCEDURE — 1160F RVW MEDS BY RX/DR IN RCRD: CPT | Mod: CPTII,S$GLB,, | Performed by: PODIATRIST

## 2023-04-20 PROCEDURE — 11055 PARING/CUTG B9 HYPRKER LES 1: CPT | Mod: Q7,S$GLB,, | Performed by: PODIATRIST

## 2023-04-20 PROCEDURE — 1159F MED LIST DOCD IN RCRD: CPT | Mod: CPTII,S$GLB,, | Performed by: PODIATRIST

## 2023-04-20 NOTE — PROGRESS NOTES
"  [address] [facility] [address]  [place] [address]  Phone: [phone]   [phone]    Patient's PCP:Stefano Lopez MD  Referring Provider: No ref. provider found    Subjective:      Chief Complaint:: Callouses (Left plantar possible pre-ulcerative ), Diabetes, Poor Circulation, Diabetes Mellitus, Diabetic Foot Ulcer, Difficulty Walking, Wound Check, Wound Care, Wound Consult, Wound Infection, Pressure Ulcer, Tinea Pedis, Foot Ulcer, Infection, Nail Problem, and Numbness    HPI  Leanna García is a 57 y.o. female who presents today with a diabetic foot ulcer located plantar aspect of left foot.    Patient also presents with complaints of long, thick toenails and callus both feet.  Gradual onset, worsening over past several weeks, aggravated by increased weight bearing, shoe gear, pressure.  Periodic debridement helps symptoms.    Patient also presents with dry skin and heel fissuring bilateral feet.    This patient has documented high risk feet requiring routine maintenance secondary to diabetes mellitis and those secondary complications of diabetes, as mentioned.    Patient also presents for a diabetic foot exam.    Systemic Doctor: Stefano Lopez MD on 11/8/2022      Vitals:    04/20/23 1344   Resp: 18   Weight: 79.4 kg (175 lb)   Height: 5' 9" (1.753 m)   PainSc: 0-No pain      Shoe Size:     Past Surgical History:   Procedure Laterality Date    ANGIOGRAPHY OF LOWER EXTREMITY Left 10/18/2022    Procedure: Angiogram Extremity Unilateral;  Surgeon: Ali Khoobehi, MD;  Location: [facility] CATH/EP LAB;  Service: Vascular;  Laterality: Left;    AV FISTULA PLACEMENT Left 8/29/2022    Procedure: CREATION, AV FISTULA;  Surgeon: Ali Khoobehi, MD;  Location: [facility] OR;  Service: Vascular;  Laterality: Left;    BONE BIOPSY Left 8/24/2022    Procedure: Biopsy-Bone;  Surgeon: Porfirio Ponce DPM;  Location: [facility] OR;  Service: Podiatry;  Laterality: Left;    COLONOSCOPY N/A 10/5/2016    Procedure: COLONOSCOPY;  " Surgeon: Pillo Chanel MD;  Location: [facility] ENDO;  Service: Endoscopy;  Laterality: N/A;    COLONOSCOPY N/A 4/26/2022    Procedure: COLONOSCOPY;  Surgeon: Saravanan Rachel MD;  Location: [facility] ENDO;  Service: Endoscopy;  Laterality: N/A;    FISTULOGRAM Left 8/24/2022    Procedure: Fistulogram;  Surgeon: Ali Khoobehi, MD;  Location: [facility] CATH/EP LAB;  Service: Vascular;  Laterality: Left;    HERNIA REPAIR Bilateral 11/22/2016    inguinal    HYSTERECTOMY      INCISION AND DRAINAGE FOOT Left 5/13/2022    Procedure: INCISION AND DRAINAGE, FOOT;  Surgeon: Ricardo Bruno DPM;  Location: [facility] OR;  Service: Podiatry;  Laterality: Left;    OOPHORECTOMY      THROMBECTOMY, AV FISTULA, UPPER EXTREMITY, PERCUTANEOUS  8/24/2022    Procedure: THROMBECTOMY, AV FISTULA, UPPER EXTREMITY, PERCUTANEOUS;  Surgeon: Ali Khoobehi, MD;  Location: [facility] CATH/EP LAB;  Service: Vascular;;    TOE AMPUTATION Left 8/26/2022    Procedure: AMPUTATION, TOE;  Surgeon: Porfirio Ponce DPM;  Location: [facility] OR;  Service: Podiatry;  Laterality: Left;     Past Medical History:   Diagnosis Date    A-fib     resolved per patient    Anemia due to end stage renal disease 6/30/2022    Arthritis     Bronchitis     Cardiovascular event risk, ASCVD 10-year risk 6.7% 10/15/2016    Diabetes mellitus type II     Diabetic foot infection     Diabetic ulcer of left midfoot 05/05/2022    Disorder of kidney and ureter     Encounter for blood transfusion     ESRD (end stage renal disease) 05/18/2018    MANJINDER (generalized anxiety disorder) 10/25/2016    History of colon polyps 11/02/2016    Hyperlipidemia     Hypertension     Stroke      Family History   Problem Relation Age of Onset    Hyperlipidemia Mother     Hypertension Mother     Hypertension Father     Diabetes Father     Diabetes Sister     Diabetes Sister     Diabetes Maternal Aunt     Diabetes Maternal Grandmother     Heart disease Maternal Grandmother     Cancer Paternal Grandmother     Breast cancer Neg Hx      Colon cancer Neg Hx     Ovarian cancer Neg Hx         Social History:   Marital Status:   Alcohol History:  reports no history of alcohol use.  Tobacco History:  reports that she has never smoked. She has never used smokeless tobacco.  Drug History:  reports no history of drug use.    Review of patient's allergies indicates:   Allergen Reactions    Dilaudid [hydromorphone] Nausea And Vomiting    Chlorhexidine Itching    Lortab [hydrocodone-acetaminophen] Itching       Current Outpatient Medications   Medication Sig Dispense Refill    acetaminophen (TYLENOL) 325 MG tablet Take 325 mg by mouth every 6 (six) hours.      ALPRAZolam (XANAX) 0.5 MG tablet Take 1 tablet by mouth.      atorvastatin (LIPITOR) 80 MG tablet Take 1 tablet (80 mg total) by mouth once daily. 90 tablet 3    azelastine (ASTELIN) 137 mcg (0.1 %) nasal spray 1 spray (137 mcg total) by Nasal route 2 (two) times daily. 30 mL 3    blood sugar diagnostic Strp To check BG 3 times daily, to use with insurance preferred meter 100 strip 1    blood sugar diagnostic Strp To check BG 4 times daily, to use with insurance preferred meter 450 strip 3    blood-glucose meter kit To check BG 3 times daily, to use with insurance preferred meter 1 each 0    cetirizine (ZYRTEC) 10 MG tablet Take 1 tablet (10 mg total) by mouth every other day. (Patient not taking: Reported on 1/12/2023) 45 tablet 3    clopidogreL (PLAVIX) 75 mg tablet Take 1 tablet (75 mg total) by mouth once daily. 90 tablet 3    doxycycline (MONODOX) 100 MG capsule Take 1 capsule (100 mg total) by mouth 2 (two) times daily. 20 capsule 0    epoetin chris-epbx (RETACRIT) 4,000 unit/mL injection Inject 1.11 mLs (4,440 Units total) into the skin every Mon, Wed, Fri.      fluticasone propionate (FLONASE) 50 mcg/actuation nasal spray 2 sprays (100 mcg total) by Each Nostril route once daily. 16 g 3    gabapentin (NEURONTIN) 100 MG capsule Take 1 capsule (100 mg total) by mouth 2 (two) times daily.  "180 capsule 3    hydrALAZINE (APRESOLINE) 25 MG tablet Take 1 tablet (25 mg total) by mouth 2 (two) times daily as needed (Systolic blood pressure > 170 mm Hg). (Patient not taking: Reported on 1/12/2023)      insulin detemir U-100 (LEVEMIR FLEXTOUCH U-100 INSULN) 100 unit/mL (3 mL) InPn pen INJECT 6 UNITS SUBCUTANEOUSLY IN THE EVENING 15 mL 3    insulin lispro (HUMALOG KWIKPEN INSULIN) 100 unit/mL pen Inject 5 Units into the skin 3 (three) times daily with meals. 4.5 mL 11    lancets [facility] To check BG 3 times daily, to use with insurance preferred meter 100 each 1    minoxidiL (LONITEN) 10 MG Tab Take 10 mg by mouth 2 (two) times daily.      multivitamin (THERAGRAN) per tablet Take 1 tablet by mouth.      pen needle, diabetic (BD ULTRA-FINE ROBERT PEN NEEDLE) 32 gauge x 5/32" Ndle 1 pen by Misc.(Non-Drug; Combo Route) route 4 (four) times daily. To use 4 times per day with insulin injections. 100 each 1    sucroferric oxyhydroxide (VELPHORO) 500 mg Chew Take 3 tablets by mouth.       No current facility-administered medications for this visit.       Review of Systems   Constitutional:  Negative for chills, fatigue, fever and unexpected weight change.   HENT:  Negative for hearing loss and trouble swallowing.    Eyes:  Negative for photophobia and visual disturbance.   Respiratory:  Negative for cough, shortness of breath and wheezing.    Cardiovascular:  Negative for chest pain, palpitations and leg swelling.   Gastrointestinal:  Negative for abdominal pain and nausea.   Genitourinary:  Negative for dysuria and frequency.   Musculoskeletal:  Positive for gait problem. Negative for arthralgias, back pain, joint swelling and myalgias.   Skin:  Positive for wound. Negative for rash.   Neurological:  Positive for numbness. Negative for tremors, seizures, weakness and headaches.   Hematological:  Does not bruise/bleed easily.       Objective:        Physical Exam:  Foot Exam  Physical Exam  Musculoskeletal:        " Feet:      Ortho/SPM Exam     Patient has previous amputation of digits 1 and 4 left foot      Physical examination: General: Pt. is well-developed, well-nourished, appears stated age, in no acute distress, alert and oriented x 3.    Vascular: Dorsalis pedis and posterior tibial pulses are 1/4  Bilaterally. Toes are warm to touch. Feet are warm proximally.    There is decreased digital hair. Skin is atrophic, slightly hyperpigmented, and mildly edematous. Capillary refill less than 5 seconds all toes/distal feet    Neurologic: [place]-Feli 5.07 monofilament is decreased bilateral feet. Sharp/dull sensation absent Bilateral feet.    Vibratory sensation absent bilateral    Musculoskeletal: adequate joint range of motion without pain, limitation, nor crepitation Bilateral feet and ankle joints. Muscle strength is 5/5 in all groups bilaterally.    Lymphatics: no lymphangitic streaking bilaterally.    Focal hyperkeratotic lesion consisting entirely of hyperkeratotic tissue without open skin, drainage, pus, fluctuance, malodor, or signs of infection located plantar aspect of right foot      Dry scale with superficial flakes without ulceration, drainage, pus, tracking, fluctuance, malodor, or cardinal signs infection.    Toenails 1st, 2nd, 3rd, 4th, 5th right foot and digits   2nd, 3rd, 5th left foot are hypertrophic, dystrophic, discolored tanish brown with tan, gray crumbly subungual debris.  Tender to distal nail plate pressure, without periungual skin abnormality of each.      Imaging:            Assessment:       1. Diabetic polyneuropathy associated with type 2 diabetes mellitus    2. Type 2 diabetes mellitus with foot ulcer, unspecified whether long term insulin use    3. Left foot pain    4. History of amputation of left foot    5. Difficulty in walking, not elsewhere classified    6. History of ulcer of lower extremity    7. At high risk for inadequate nutritional intake    8. Pes planus of both feet    9.  "Decreased pedal pulses    10. Paresthesias    11. Tinea pedis of both feet    12. Onychogryphosis    13. Type 2 diabetes mellitus with diabetic neuropathy, unspecified whether long term insulin use    14. Fissure in skin of both feet    15. Diabetic ulcer of left midfoot associated with type 2 diabetes mellitus, limited to breakdown of skin    16. History of amputation of lesser toe of left foot    17. Ulcer of left foot with necrosis of muscle - Left Foot      Plan:   Diabetic polyneuropathy associated with type 2 diabetes mellitus  -     Ambulatory referral/consult to Wound Clinic; Future; Expected date: 04/27/2023    Type 2 diabetes mellitus with foot ulcer, unspecified whether long term insulin use    Left foot pain    History of amputation of left foot    Difficulty in walking, not elsewhere classified    History of ulcer of lower extremity    At high risk for inadequate nutritional intake    Pes planus of both feet    Decreased pedal pulses    Paresthesias    Tinea pedis of both feet    Onychogryphosis    Type 2 diabetes mellitus with diabetic neuropathy, unspecified whether long term insulin use  -     ANKLE FOOT ORTHOSIS FOR HOME USE    Fissure in skin of both feet    Diabetic ulcer of left midfoot associated with type 2 diabetes mellitus, limited to breakdown of skin  -     Ambulatory referral/consult to Home Health; Future; Expected date: 04/21/2023  -     ANKLE FOOT ORTHOSIS FOR HOME USE    History of amputation of lesser toe of left foot    Ulcer of left foot with necrosis of muscle - Left Foot  -     Wound Debridement    Other orders  -     doxycycline (MONODOX) 100 MG capsule; Take 1 capsule (100 mg total) by mouth 2 (two) times daily.  Dispense: 20 capsule; Refill: 0  -     Routine Foot Care      No follow-ups on file.    Wound Debridement    Date/Time: 4/20/2023 1:40 PM  Performed by: Alivia Bruno DPM  Authorized by: Alivia Bruno DPM     Time out: Immediately prior to procedure a "time out" was " "called to verify the correct patient, procedure, equipment, support staff and site/side marked as required.    Consent Done?:  Yes (Written)    Preparation: Patient was prepped and draped in usual sterile fashion, Patient was prepped and draped with aseptic technique and Patient was prepped and draped with clean technique      Wound Details:    Location:  Left foot    Location:  Left Plantar    Type of Debridement:  Excisional       Length (cm):  2.2       Area (sq cm):  5.72       Width (cm):  2.6       Percent Debrided (%):  100       Depth (cm):  0.3       Total Area Debrided (sq cm):  5.72    Depth of debridement:  Subcutaneous tissue    Tissue debrided:  Subcutaneous    Devitalized tissue debrided:  Biofilm, Callus, Necrotic/Eschar and Slough    Instruments:  Blade, Curette, Forceps and Nippers  Bleeding:  Minimal  Hemostasis Achieved: Yes  Method Used:  Pressure  Patient tolerance:  Patient tolerated the procedure well with no immediate complications  1st Wound Pain Assessment: 0  Specimen Collected: Specimen sent to pathology  Routine Foot Care    Date/Time: 4/20/2023 1:40 PM  Performed by: Alivia Bruno DPM  Authorized by: Alivia Bruno DPM     Time out: Immediately prior to procedure a "time out" was called to verify the correct patient, procedure, equipment, support staff and site/side marked as required.    Consent Done?:  Yes (Verbal)  Hyperkeratotic Skin Lesions?: Yes    Number of trimmed lesions:  1  Location(s):  Right Plantar    Nail Care Type:  Debride(Left 2nd Toe, Left 3rd Toe, Left 5th Toe, Right 1st Toe, Right 2nd Toe, Right 3rd Toe, Right 5th Toe and Right 4th Toe)  Patient tolerance:  Patient tolerated the procedure well with no immediate complications          Counseling:     I provided patient education verbally regarding:   Patient diagnosis, treatment options, as well as alternatives, risks, and benefits.     This note was created using Dragon voice recognition software that occasionally " misinterpreted phrases or words.         Follow-up: Patient is to return to the clinic in 1 week(s) for follow-up but should call the office immediately if any signs of infection, such as fever, chills, sweats, increased redness or pain.  Recommendations:  Check feet daily.  Home health dressing changes to consist of Iodosorb.  No Barefoot  Use surgery shoe for walking or standing.    I provided patient education verbally regarding: proper ulcer care and the possible need for serial debridements, topical medications, specific dressings and biological engineered skin substitutes if indicated.      Fungal infection of toenails explained. Treatment options including no treatment, periodic debridement, topical medications, oral medications, and removal of the nail were discussed, as well as success rates and risks of recurrence. We agreed on periodic debridement       Fungal infection of skin explained. Treatment options including no treatment, topical medications, oral medications were discussed, as well as success rates and risks of recurrence. We agreed on topical medication       Athlete's foot counseling: Counseled patient that it is important to keep the feet dry. Use absorbent cotton socks and change them if they become sweaty. Or wear an open-toe shoe or sandal. Wash the feet at least once a day with soap and water. Apply the antifungal cream as prescribed. Recommend spray antiperspirant to the feet. Some antifungal creams are available without a prescription. It may take a week before the rash starts to improve. It can take about 3 to 4 weeks to completely clear. Continue the medicine until the rash is all gone. Use over-the-counter antifungal powders or sprays on your feet after exposure to high-risk environments, such as public showers, gyms, and locker rooms. This can help prevent future infections. Wearing appropriate shoes in these situations can help.     Fungal infection of toenails explained. Treatment  options including no treatment, periodic debridement, topical medications, oral medications, and removal of the nail were discussed, as well as success rates and risks of recurrence. We agreed on periodic debridement       Additional patient instructions:     - Check feet daily for wounds and areas of irritation.     - Keep regularly scheduled appointments     - Apply moisturizing cream to feet and ankles daily but not between toes.     - Keep feet clean and dry, especially between toes.     - Elevate feet as much as possible throughout the day.     - Wear supportive/accommodative shoes at all times when ambulating.     - Wear over-the-counter compression socks at all times when ambulating.  Do not wear them while resting for prolonged periods of time such as when sleeping.     - Notify clinic immediately of any new or worsening conditions.        Counseling/Education:  I provided patient education verbally regarding:   The aspects of diabetes and how it pertains to the feet. I explained the importance of proper diabetic foot care and how it is essential for the health of their feet.    I discussed the importance of knowing their Hemoglobin A1c and that the level needs to be as close to 6 as possible. I discussed the increase complications of high blood sugar including stroke, blindness, heart attack, kidney failure and loss of limb secondary to neuropathy and PVD.     With neuropathy, beware of any breaks in the skin or redness. These areas are not recognized early due to the numbness.    I discussed Diabetes, lower back issues, metabolic disorders, systemic causes, chemotherapy, vitamin deficiency, heavy metal exposure, as some of the causes. I also explained that as much as 40% of the time we can not find a cause. I discussed different treatments available to control the symptoms but which may not cure the problem.       Counseled patient on the aspects of diabetes and how it pertains to the feet.  I explained  the importance of proper diabetic foot care and how it is essential for the health of their feet.      Shoe inspection. Patient instructed on proper foot hygeine. We discussed wearing proper shoe gear, daily foot inspections, never walking without protective shoe gear, never putting sharp instruments to feet, routine podiatric nail visits every 2-3 months.        I counseled the patient on their conditions, their implications and medical management.     >50% of this > 60 minute visit was spent face to face educating/counseling the patient    I spent a total of 60 minutes on the day of the visit.This includes face to face time and non-face to face time preparing to see the patient (eg, review of tests), obtaining and/or reviewing separately obtained history, documenting clinical information in the electronic or other health record, independently interpreting results and communicating results to the patient/family/caregiver, or care coordinator.       Patient should call the office immediately if any signs of infection, such as fever, chills, sweats, increased redness or pain.    Patient was instructed to call the clinic or go to the emergency department if their symptoms do not improve, worsens, or if new symptoms develop.  Patient was advised that if any increased swelling, pain, or numbness arise to go immediately to the ED. Patient knows to call any time if an emergency arises. Shared decision making occurred and patient verbalized understanding in agreement with this plan.

## 2023-04-21 ENCOUNTER — TELEPHONE (OUTPATIENT)
Dept: PODIATRY | Facility: CLINIC | Age: 58
End: 2023-04-21
Payer: MEDICARE

## 2023-04-21 RX ORDER — DOXYCYCLINE 100 MG/1
100 CAPSULE ORAL 2 TIMES DAILY
Qty: 20 CAPSULE | Refills: 0 | Status: SHIPPED | OUTPATIENT
Start: 2023-04-21 | End: 2024-01-22 | Stop reason: ALTCHOICE

## 2023-04-21 NOTE — TELEPHONE ENCOUNTER
----- Message from Kacey Pena sent at 4/21/2023  2:32 PM CDT -----  Regarding: Pt voicemail  Patient called to see if we sent the order for her diabetic shoes yet. Please call her back at [phone].    Thank you,  Kacey

## 2023-04-25 ENCOUNTER — OFFICE VISIT (OUTPATIENT)
Dept: WOUND CARE | Facility: HOSPITAL | Age: 58
End: 2023-04-25
Attending: PODIATRIST
Payer: MEDICARE

## 2023-04-25 VITALS
SYSTOLIC BLOOD PRESSURE: 113 MMHG | DIASTOLIC BLOOD PRESSURE: 80 MMHG | TEMPERATURE: 98 F | RESPIRATION RATE: 17 BRPM | HEART RATE: 85 BPM

## 2023-04-25 DIAGNOSIS — E11.9 ENCOUNTER FOR DIABETIC FOOT EXAM: ICD-10-CM

## 2023-04-25 DIAGNOSIS — R60.0 BILATERAL LOWER EXTREMITY EDEMA: ICD-10-CM

## 2023-04-25 DIAGNOSIS — R26.2 DIFFICULTY IN WALKING, NOT ELSEWHERE CLASSIFIED: ICD-10-CM

## 2023-04-25 DIAGNOSIS — Z79.4 TYPE 2 DIABETES MELLITUS WITH CHRONIC KIDNEY DISEASE ON CHRONIC DIALYSIS, WITH LONG-TERM CURRENT USE OF INSULIN: ICD-10-CM

## 2023-04-25 DIAGNOSIS — N18.6 ESRD (END STAGE RENAL DISEASE): ICD-10-CM

## 2023-04-25 DIAGNOSIS — Z79.4 TYPE 2 DIABETES MELLITUS WITH HYPOGLYCEMIA AND COMA, WITH LONG-TERM CURRENT USE OF INSULIN: ICD-10-CM

## 2023-04-25 DIAGNOSIS — I73.9 PERIPHERAL VASCULAR DISEASE: ICD-10-CM

## 2023-04-25 DIAGNOSIS — Z89.432 HISTORY OF AMPUTATION OF LEFT FOOT: ICD-10-CM

## 2023-04-25 DIAGNOSIS — Z87.2 HISTORY OF FOOT ULCER: ICD-10-CM

## 2023-04-25 DIAGNOSIS — E11.59 HYPERTENSION ASSOCIATED WITH DIABETES: ICD-10-CM

## 2023-04-25 DIAGNOSIS — Z91.89 AT RISK FOR READMISSION TO HOSPITAL: ICD-10-CM

## 2023-04-25 DIAGNOSIS — E11.641 TYPE 2 DIABETES MELLITUS WITH HYPOGLYCEMIA AND COMA, WITH LONG-TERM CURRENT USE OF INSULIN: ICD-10-CM

## 2023-04-25 DIAGNOSIS — E11.621 DIABETIC ULCER OF LEFT MIDFOOT ASSOCIATED WITH TYPE 2 DIABETES MELLITUS, WITH NECROSIS OF MUSCLE: ICD-10-CM

## 2023-04-25 DIAGNOSIS — E11.649 TYPE 2 DIABETES MELLITUS WITH HYPOGLYCEMIA UNAWARENESS: ICD-10-CM

## 2023-04-25 DIAGNOSIS — Z99.2 TYPE 2 DIABETES MELLITUS WITH CHRONIC KIDNEY DISEASE ON CHRONIC DIALYSIS, WITH LONG-TERM CURRENT USE OF INSULIN: ICD-10-CM

## 2023-04-25 DIAGNOSIS — L97.423 DIABETIC ULCER OF LEFT MIDFOOT ASSOCIATED WITH TYPE 2 DIABETES MELLITUS, WITH NECROSIS OF MUSCLE: ICD-10-CM

## 2023-04-25 DIAGNOSIS — E11.9 COMPREHENSIVE DIABETIC FOOT EXAMINATION, TYPE 2 DM, ENCOUNTER FOR: ICD-10-CM

## 2023-04-25 DIAGNOSIS — L97.523 ULCER OF LEFT FOOT WITH NECROSIS OF MUSCLE: Primary | ICD-10-CM

## 2023-04-25 DIAGNOSIS — Z91.89 AT HIGH RISK FOR INADEQUATE NUTRITIONAL INTAKE: ICD-10-CM

## 2023-04-25 DIAGNOSIS — I15.2 HYPERTENSION ASSOCIATED WITH DIABETES: ICD-10-CM

## 2023-04-25 DIAGNOSIS — L97.522 ULCER OF LEFT FOOT WITH FAT LAYER EXPOSED: ICD-10-CM

## 2023-04-25 DIAGNOSIS — Z86.73 HISTORY OF TIA (TRANSIENT ISCHEMIC ATTACK): ICD-10-CM

## 2023-04-25 DIAGNOSIS — N18.6 TYPE 2 DIABETES MELLITUS WITH CHRONIC KIDNEY DISEASE ON CHRONIC DIALYSIS, WITH LONG-TERM CURRENT USE OF INSULIN: ICD-10-CM

## 2023-04-25 DIAGNOSIS — E11.22 TYPE 2 DIABETES MELLITUS WITH CHRONIC KIDNEY DISEASE ON CHRONIC DIALYSIS, WITH LONG-TERM CURRENT USE OF INSULIN: ICD-10-CM

## 2023-04-25 DIAGNOSIS — R09.89 DECREASED PEDAL PULSES: ICD-10-CM

## 2023-04-25 PROCEDURE — 4010F PR ACE/ARB THEARPY RXD/TAKEN: ICD-10-PCS | Mod: CPTII,,, | Performed by: PODIATRIST

## 2023-04-25 PROCEDURE — 3074F PR MOST RECENT SYSTOLIC BLOOD PRESSURE < 130 MM HG: ICD-10-PCS | Mod: CPTII,,, | Performed by: PODIATRIST

## 2023-04-25 PROCEDURE — 11042 PR DEBRIDEMENT, SKIN, SUB-Q TISSUE,=<20 SQ CM: ICD-10-PCS | Mod: ,,, | Performed by: PODIATRIST

## 2023-04-25 PROCEDURE — 99213 OFFICE O/P EST LOW 20 MIN: CPT | Mod: 25 | Performed by: PODIATRIST

## 2023-04-25 PROCEDURE — 3079F DIAST BP 80-89 MM HG: CPT | Mod: CPTII,,, | Performed by: PODIATRIST

## 2023-04-25 PROCEDURE — 3074F SYST BP LT 130 MM HG: CPT | Mod: CPTII,,, | Performed by: PODIATRIST

## 2023-04-25 PROCEDURE — 1160F RVW MEDS BY RX/DR IN RCRD: CPT | Mod: CPTII,,, | Performed by: PODIATRIST

## 2023-04-25 PROCEDURE — 3079F PR MOST RECENT DIASTOLIC BLOOD PRESSURE 80-89 MM HG: ICD-10-PCS | Mod: CPTII,,, | Performed by: PODIATRIST

## 2023-04-25 PROCEDURE — 1159F MED LIST DOCD IN RCRD: CPT | Mod: CPTII,,, | Performed by: PODIATRIST

## 2023-04-25 PROCEDURE — 1159F PR MEDICATION LIST DOCUMENTED IN MEDICAL RECORD: ICD-10-PCS | Mod: CPTII,,, | Performed by: PODIATRIST

## 2023-04-25 PROCEDURE — 99214 PR OFFICE/OUTPT VISIT, EST, LEVL IV, 30-39 MIN: ICD-10-PCS | Mod: 25,,, | Performed by: PODIATRIST

## 2023-04-25 PROCEDURE — 4010F ACE/ARB THERAPY RXD/TAKEN: CPT | Mod: CPTII,,, | Performed by: PODIATRIST

## 2023-04-25 PROCEDURE — 11042 DBRDMT SUBQ TIS 1ST 20SQCM/<: CPT | Performed by: PODIATRIST

## 2023-04-25 PROCEDURE — 1160F PR REVIEW ALL MEDS BY PRESCRIBER/CLIN PHARMACIST DOCUMENTED: ICD-10-PCS | Mod: CPTII,,, | Performed by: PODIATRIST

## 2023-04-25 PROCEDURE — 11042 DBRDMT SUBQ TIS 1ST 20SQCM/<: CPT | Mod: ,,, | Performed by: PODIATRIST

## 2023-04-25 PROCEDURE — 99214 OFFICE O/P EST MOD 30 MIN: CPT | Mod: 25,,, | Performed by: PODIATRIST

## 2023-04-25 NOTE — PROGRESS NOTES
[address]  [place] [address]  Phone: [phone]   [phone]    Patient's PCP:Stefano Lopez MD  Referring Provider: Aaareferral Self    Subjective:      Chief Complaint:: Wound Care, Diabetes, Wound Check, Pressure Ulcer, Non-healing Wound, Numbness, Diabetic Foot Ulcer, Difficulty Walking, Foot Ulcer, and Diabetic Foot Exam    HPI  Leanna García is a 57 y.o. female who presents today for follow up of a diabetic foot ulcer located plantar aspect of left foot.     Patient also presents for a diabetic foot exam.  Systemic Doctor: Stefano Lopez MD on 11/8/2022    1. Debridement See Wound Docs for assessment of wounds and procedure notes  2. Continue taking all medications as prescribed  3. Continue home health dressing changes  4. RTC one week   5. Counseled patient on increasing protein intake, not getting wound wet, keeping dressing clean dry and intact, following a healthy diet, elevating legs when able, removing pressure from wound    Total time spent for E&M 35  Total time for debridement 35 minutes        Counseled patient on increasing protein intake, not getting wound wet, keeping dressing clean dry and intact, following a healthy diet, elevating legs when able, removing pressure from wound      Counseling/Education:  I provided patient education verbally regarding:   The aspects of diabetes and how it pertains to the feet. I explained the importance of proper diabetic foot care and how it is essential for the health of their feet.    I discussed the importance of knowing their Hemoglobin A1c and that the level needs to be as close to 6 as possible. I discussed the increase complications of high blood sugar including stroke, blindness, heart attack, kidney failure and loss of limb secondary to neuropathy and PVD.     With neuropathy, beware of any breaks in the skin or redness. These areas are not recognized early due to the numbness.    I discussed Diabetes, lower  back issues, metabolic disorders, systemic causes, chemotherapy, vitamin deficiency, heavy metal exposure, as some of the causes. I also explained that as much as 40% of the time we can not find a cause. I discussed different treatments available to control the symptoms but which may not cure the problem.       Counseled patient on the aspects of diabetes and how it pertains to the feet.  I explained the importance of proper diabetic foot care and how it is essential for the health of their feet.      Shoe inspection. Patient instructed on proper foot hygeine. We discussed wearing proper shoe gear, daily foot inspections, never walking without protective shoe gear, never putting sharp instruments to feet, routine podiatric nail visits every 2-3 months.      Patient should call the office immediately if any signs of infection, such as fever, chills, sweats, increased redness or pain.    Patient was instructed to call the clinic or go to the emergency department if their symptoms do not improve, worsens, or if new symptoms develop.  Patient was advised that if any increased swelling, pain, or numbness arise to go immediately to the ED. Patient knows to call any time if an emergency arises. Shared decision making occurred and patient verbalized understanding in agreement with this plan.       I counseled the patient on their conditions, their implications and medical management.     >50% of this > 60 minute visit was spent face to face educating/counseling the patient    I spent a total of 60 minutes on the day of the visit.This includes face to face time and non-face to face time preparing to see the patient (eg, review of tests), obtaining and/or reviewing separately obtained history, documenting clinical information in the electronic or other health record, independently interpreting results and communicating results to the patient/family/caregiver, or care coordinator.       Much of the documentation for this visit  was completed in the Wound Docs system.  Please see the attached documentation for further details about the patient's care. Scanned under the Media tab.        Alivia Bruno DPM          Vitals:    04/25/23 1155   BP: 113/80   Pulse: 85   Resp: 17   Temp: 98 °F (36.7 °C)   PainSc: 0-No pain      Shoe Size:     Past Surgical History:   Procedure Laterality Date    ANGIOGRAPHY OF LOWER EXTREMITY Left 10/18/2022    Procedure: Angiogram Extremity Unilateral;  Surgeon: Ali Khoobehi, MD;  Location: [facility] CATH/EP LAB;  Service: Vascular;  Laterality: Left;    AV FISTULA PLACEMENT Left 8/29/2022    Procedure: CREATION, AV FISTULA;  Surgeon: Ali Khoobehi, MD;  Location: [facility] OR;  Service: Vascular;  Laterality: Left;    BONE BIOPSY Left 8/24/2022    Procedure: Biopsy-Bone;  Surgeon: Porfirio Ponce DPM;  Location: [facility];  Service: Podiatry;  Laterality: Left;    COLONOSCOPY N/A 10/5/2016    Procedure: COLONOSCOPY;  Surgeon: Pillo Chanel MD;  Location: [facility] ENDO;  Service: Endoscopy;  Laterality: N/A;    COLONOSCOPY N/A 4/26/2022    Procedure: COLONOSCOPY;  Surgeon: Saravanan Rachel MD;  Location: [facility] ENDO;  Service: Endoscopy;  Laterality: N/A;    FISTULOGRAM Left 8/24/2022    Procedure: Fistulogram;  Surgeon: Ali Khoobehi, MD;  Location: [facility] CATH/EP LAB;  Service: Vascular;  Laterality: Left;    HERNIA REPAIR Bilateral 11/22/2016    inguinal    HYSTERECTOMY      INCISION AND DRAINAGE FOOT Left 5/13/2022    Procedure: INCISION AND DRAINAGE, FOOT;  Surgeon: Ricardo Bruno DPM;  Location: [facility] OR;  Service: Podiatry;  Laterality: Left;    OOPHORECTOMY      THROMBECTOMY, AV FISTULA, UPPER EXTREMITY, PERCUTANEOUS  8/24/2022    Procedure: THROMBECTOMY, AV FISTULA, UPPER EXTREMITY, PERCUTANEOUS;  Surgeon: Ali Khoobehi, MD;  Location: [facility] CATH/EP LAB;  Service: Vascular;;    TOE AMPUTATION Left 8/26/2022    Procedure: AMPUTATION, TOE;  Surgeon: Porfirio Ponce DPM;  Location: [facility] OR;  Service: Podiatry;   Laterality: Left;     Past Medical History:   Diagnosis Date    A-fib     resolved per patient    Anemia due to end stage renal disease 6/30/2022    Arthritis     Bronchitis     Cardiovascular event risk, ASCVD 10-year risk 6.7% 10/15/2016    Diabetes mellitus type II     Diabetic foot infection     Diabetic ulcer of left midfoot 05/05/2022    Disorder of kidney and ureter     Encounter for blood transfusion     ESRD (end stage renal disease) 05/18/2018    MANJINDER (generalized anxiety disorder) 10/25/2016    History of colon polyps 11/02/2016    Hyperlipidemia     Hypertension     Stroke      Family History   Problem Relation Age of Onset    Hyperlipidemia Mother     Hypertension Mother     Hypertension Father     Diabetes Father     Diabetes Sister     Diabetes Sister     Diabetes Maternal Aunt     Diabetes Maternal Grandmother     Heart disease Maternal Grandmother     Cancer Paternal Grandmother     Breast cancer Neg Hx     Colon cancer Neg Hx     Ovarian cancer Neg Hx         Social History:   Marital Status:   Alcohol History:  reports no history of alcohol use.  Tobacco History:  reports that she has never smoked. She has never used smokeless tobacco.  Drug History:  reports no history of drug use.    Review of patient's allergies indicates:   Allergen Reactions    Dilaudid [hydromorphone] Nausea And Vomiting    Chlorhexidine Itching    Lortab [hydrocodone-acetaminophen] Itching       Current Outpatient Medications   Medication Sig Dispense Refill    acetaminophen (TYLENOL) 325 MG tablet Take 325 mg by mouth every 6 (six) hours.      ALPRAZolam (XANAX) 0.5 MG tablet Take 1 tablet by mouth.      atorvastatin (LIPITOR) 80 MG tablet Take 1 tablet (80 mg total) by mouth once daily. 90 tablet 3    azelastine (ASTELIN) 137 mcg (0.1 %) nasal spray 1 spray (137 mcg total) by Nasal route 2 (two) times daily. 30 mL 3    blood sugar diagnostic Strp To check BG 3 times daily, to use with insurance preferred meter 100  "strip 1    blood sugar diagnostic Strp To check BG 4 times daily, to use with insurance preferred meter 450 strip 3    blood-glucose meter kit To check BG 3 times daily, to use with insurance preferred meter 1 each 0    cetirizine (ZYRTEC) 10 MG tablet Take 1 tablet (10 mg total) by mouth every other day. (Patient not taking: Reported on 1/12/2023) 45 tablet 3    clopidogreL (PLAVIX) 75 mg tablet Take 1 tablet (75 mg total) by mouth once daily. 90 tablet 3    doxycycline (MONODOX) 100 MG capsule Take 1 capsule (100 mg total) by mouth 2 (two) times daily. 20 capsule 0    epoetin chris-epbx (RETACRIT) 4,000 unit/mL injection Inject 1.11 mLs (4,440 Units total) into the skin every Mon, Wed, Fri.      fluticasone propionate (FLONASE) 50 mcg/actuation nasal spray 2 sprays (100 mcg total) by Each Nostril route once daily. 16 g 3    gabapentin (NEURONTIN) 100 MG capsule Take 1 capsule (100 mg total) by mouth 2 (two) times daily. 180 capsule 3    hydrALAZINE (APRESOLINE) 25 MG tablet Take 1 tablet (25 mg total) by mouth 2 (two) times daily as needed (Systolic blood pressure > 170 mm Hg). (Patient not taking: Reported on 1/12/2023)      insulin detemir U-100 (LEVEMIR FLEXTOUCH U-100 INSULN) 100 unit/mL (3 mL) InPn pen INJECT 6 UNITS SUBCUTANEOUSLY IN THE EVENING 15 mL 3    insulin lispro (HUMALOG KWIKPEN INSULIN) 100 unit/mL pen Inject 5 Units into the skin 3 (three) times daily with meals. 4.5 mL 11    lancets [facility] To check BG 3 times daily, to use with insurance preferred meter 100 each 1    minoxidiL (LONITEN) 10 MG Tab Take 10 mg by mouth 2 (two) times daily.      multivitamin (THERAGRAN) per tablet Take 1 tablet by mouth.      pen needle, diabetic (BD ULTRA-FINE ROBERT PEN NEEDLE) 32 gauge x 5/32" Ndle 1 pen by Misc.(Non-Drug; Combo Route) route 4 (four) times daily. To use 4 times per day with insulin injections. 100 each 1    sucroferric oxyhydroxide (VELPHORO) 500 mg Chew Take 3 tablets by mouth.       No current " facility-administered medications for this visit.       Review of Systems   Constitutional:  Negative for chills, fatigue, fever and unexpected weight change.   HENT:  Negative for hearing loss and trouble swallowing.    Eyes:  Negative for photophobia and visual disturbance.   Respiratory:  Negative for cough, shortness of breath and wheezing.    Cardiovascular:  Negative for chest pain, palpitations and leg swelling.   Gastrointestinal:  Negative for abdominal pain and nausea.   Genitourinary:  Negative for dysuria and frequency.   Musculoskeletal:  Positive for gait problem. Negative for arthralgias, back pain, joint swelling and myalgias.   Skin:  Positive for wound. Negative for rash.   Neurological:  Positive for numbness. Negative for tremors, seizures, weakness and headaches.   Hematological:  Does not bruise/bleed easily.       Objective:        Physical Exam:   Foot Exam  Physical Exam  Ortho/SPM Exam     Imaging:            Assessment:       1. Ulcer of left foot with necrosis of muscle    2. History of amputation of left foot    3. Ulcer of left foot with fat layer exposed    4. Type 2 diabetes mellitus with chronic kidney disease on chronic dialysis, with long-term current use of insulin    5. Peripheral vascular disease    6. Diabetic ulcer of left midfoot associated with type 2 diabetes mellitus, with necrosis of muscle    7. History of foot ulcer    8. 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 At risk for readmission to hospital    10. Decreased pedal pulses    11. At high risk for inadequate nutritional intake    12. Difficulty in walking, not elsewhere classified    13. Type 2 diabetes mellitus with hypoglycemia and coma, with long-term current use of insulin    14. ESRD (end stage renal disease)    15. Bilateral lower extremity edema    16. Hypertension associated with diabetes    17. History of TIA (transient ischemic attack)    18. Comprehensive diabetic foot examination, type  2 DM, encounter for    19. Encounter for diabetic foot exam      Plan:   Ulcer of left foot with necrosis of muscle    History of amputation of left foot    Ulcer of left foot with fat layer exposed    Type 2 diabetes mellitus with chronic kidney disease on chronic dialysis, with long-term current use of insulin  -      DIABETES FOOT EXAM    Peripheral vascular disease    Diabetic ulcer of left midfoot associated with type 2 diabetes mellitus, with necrosis of muscle    History of foot ulcer    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    At risk for readmission to hospital    Decreased pedal pulses    At high risk for inadequate nutritional intake    Difficulty in walking, not elsewhere classified    Type 2 diabetes mellitus with hypoglycemia and coma, with long-term current use of insulin    ESRD (end stage renal disease)    Bilateral lower extremity edema    Hypertension associated with diabetes    History of TIA (transient ischemic attack)    Comprehensive diabetic foot examination, type 2 DM, encounter for  -      DIABETES FOOT EXAM    Encounter for diabetic foot exam  -      DIABETES FOOT EXAM      Follow up in about 2 weeks (around 5/9/2023).    Procedures          Counseling:     I provided patient education verbally regarding:   Patient diagnosis, treatment options, as well as alternatives, risks, and benefits.     This note was created using Dragon voice recognition software that occasionally misinterpreted phrases or words.

## 2023-04-27 NOTE — TELEPHONE ENCOUNTER
Contacted patient.  She has received her diabetic shoes but has been unable to wear them as she has another wound currently.

## 2023-05-04 ENCOUNTER — PATIENT MESSAGE (OUTPATIENT)
Dept: ADMINISTRATIVE | Facility: HOSPITAL | Age: 58
End: 2023-05-04
Payer: MEDICARE

## 2023-05-10 ENCOUNTER — EXTERNAL HOME HEALTH (OUTPATIENT)
Dept: HOME HEALTH SERVICES | Facility: HOSPITAL | Age: 58
End: 2023-05-10
Payer: MEDICARE

## 2023-05-16 ENCOUNTER — PATIENT MESSAGE (OUTPATIENT)
Dept: ADMINISTRATIVE | Facility: HOSPITAL | Age: 58
End: 2023-05-16
Payer: MEDICARE

## 2023-06-05 ENCOUNTER — PATIENT OUTREACH (OUTPATIENT)
Dept: ADMINISTRATIVE | Facility: HOSPITAL | Age: 58
End: 2023-06-05
Payer: MEDICARE

## 2023-06-05 ENCOUNTER — PATIENT MESSAGE (OUTPATIENT)
Dept: ADMINISTRATIVE | Facility: HOSPITAL | Age: 58
End: 2023-06-05
Payer: MEDICARE

## 2023-06-05 NOTE — PROGRESS NOTES
Population Health Chart Review & Patient Outreach Details:     Reason for Outreach Encounter:     [x]  Non-Compliant Report   []  Payor Report ([employer], PHN, BCBS, MSSP, MCIP, C, etc.)   []  Pre-Visit Chart Review     Updates Requested / Reviewed:     [x]  Care Everywhere    [x]     []  External Sources ([facility], [facility], DIS, etc.)   [x]  Care Team Updated    Patient Outreach Method:    []  Telephone Outreach Completed   [] Successful   [] Left Voicemail   [] Unable to Contact (wrong number, no voicemail)  []  [employer] Portal Outreach Sent  []  Letter Outreach Mailed  []  Fax Sent for External Records  []  External Records Upload    Health Maintenance Topics Addressed and Outreach Outcomes / Actions Taken:        []      Breast Cancer Screening []  Mammo Scheduled      []  External Records Requested     []  Added Reminder to Complete to Upcoming Primary Care Appt Notes     []  Patient Declined     []  Patient Will Call Back to Schedule     []  Patient Will Schedule with External Provider / Order Routed if Applicable             []       Cervical Cancer Screening []  Pap Scheduled      []  External Records Requested     []  Added Reminder to Complete to Upcoming Primary Care Appt Notes     []  Patient Declined     []  Patient Will Call Back to Schedule     []  Patient Will Schedule with External Provider               []          Colorectal Cancer Screening []  Colonoscopy Case Request or Referral Placed     []  External Records Requested     []  Added Reminder to Complete to Upcoming Primary Care Appt Notes     []  Patient Declined     []  Patient Will Call Back to Schedule     []  Patient Will Schedule with External Provider     []  Fit Kit Mailed (add the SmartPhrase under additional notes)     []  Reminded Patient to Complete Home Test             [x]      Diabetic Eye Exam []  Eye Camera Scheduled or Optometry Referral Placed     []  External Records Requested     [x]  Added Reminder to Complete to  Upcoming Primary Care Appt Notes     []  Patient Declined     []  Patient Will Call Back to Schedule     []  Patient Will Schedule with External Provider             []      Blood Pressure Control []  Primary Care Follow Up Visit Scheduled     []  Remote Blood Pressure Reading Captured     []  Added Reminder to Complete to Upcoming Primary Care Appt Notes     []  Patient Declined     []  Patient Will Call Back / Patient Will Send Portal Message with Reading     []  Patient Will Call Back to Schedule Provider Visit             []       HbA1c & Other Labs []  Lab Appt Scheduled for Due Labs     []  Primary Care Follow Up Visit Scheduled      []  Reminded Patient to Complete Home Test     []  Added Reminder to Complete to Upcoming Primary Care Appt Notes     []  Patient Declined     []  Patient Will Call Back to Schedule     []  Patient Will Schedule with External Provider / Order Routed if Applicable           []    Schedule Primary Care Appt []  Primary Care Appt Scheduled     []  Patient Declined     []  Patient Will Call Back to Schedule     []  Pt Established with External Provider & Updated Care Team             []      Medication Adherence []  Primary Care Appointment Scheduled     []  Added Reminder to Upcoming Primary Care Appt Notes     []  Patient Reminded to  Prescription     []  Patient Declined, Provider Notified if Needed     []  Sent Provider Message to Review and/or Add Exclusion to Problem List             []      Osteoporosis Screening []  DXA Appointment Scheduled     []  External Records Requested     []  Added Reminder to Complete to Upcoming Primary Care Appt Notes     []  Patient Declined     []  Patient Will Call Back to Schedule     []  Patient Will Schedule with External Provider / Order Routed if Applicable     Additional Care Coordinator Notes:         Further Action Needed If Patient Returns Outreach:

## 2023-06-06 ENCOUNTER — TELEPHONE (OUTPATIENT)
Dept: FAMILY MEDICINE | Facility: CLINIC | Age: 58
End: 2023-06-06

## 2023-06-06 ENCOUNTER — OFFICE VISIT (OUTPATIENT)
Dept: FAMILY MEDICINE | Facility: CLINIC | Age: 58
End: 2023-06-06
Payer: COMMERCIAL

## 2023-06-06 ENCOUNTER — OFFICE VISIT (OUTPATIENT)
Dept: WOUND CARE | Facility: HOSPITAL | Age: 58
End: 2023-06-06
Attending: PODIATRIST
Payer: MEDICARE

## 2023-06-06 ENCOUNTER — LAB VISIT (OUTPATIENT)
Dept: LAB | Facility: HOSPITAL | Age: 58
End: 2023-06-06
Attending: FAMILY MEDICINE
Payer: COMMERCIAL

## 2023-06-06 VITALS
RESPIRATION RATE: 19 BRPM | TEMPERATURE: 98 F | DIASTOLIC BLOOD PRESSURE: 90 MMHG | SYSTOLIC BLOOD PRESSURE: 159 MMHG | HEART RATE: 73 BPM

## 2023-06-06 VITALS
OXYGEN SATURATION: 98 % | WEIGHT: 174.81 LBS | TEMPERATURE: 98 F | DIASTOLIC BLOOD PRESSURE: 60 MMHG | HEART RATE: 84 BPM | HEIGHT: 69 IN | BODY MASS INDEX: 25.89 KG/M2 | RESPIRATION RATE: 16 BRPM | SYSTOLIC BLOOD PRESSURE: 144 MMHG

## 2023-06-06 DIAGNOSIS — E11.22 TYPE 2 DIABETES MELLITUS WITH CHRONIC KIDNEY DISEASE ON CHRONIC DIALYSIS, WITH LONG-TERM CURRENT USE OF INSULIN: ICD-10-CM

## 2023-06-06 DIAGNOSIS — I15.2 HYPERTENSION ASSOCIATED WITH DIABETES: ICD-10-CM

## 2023-06-06 DIAGNOSIS — Z79.4 TYPE 2 DIABETES MELLITUS WITH CHRONIC KIDNEY DISEASE ON CHRONIC DIALYSIS, WITH LONG-TERM CURRENT USE OF INSULIN: ICD-10-CM

## 2023-06-06 DIAGNOSIS — L03.119 CELLULITIS AND ABSCESS OF FOOT: ICD-10-CM

## 2023-06-06 DIAGNOSIS — Z87.2 HISTORY OF FOOT ULCER: ICD-10-CM

## 2023-06-06 DIAGNOSIS — R60.0 BILATERAL LOWER EXTREMITY EDEMA: ICD-10-CM

## 2023-06-06 DIAGNOSIS — Z99.2 TYPE 2 DIABETES MELLITUS WITH CHRONIC KIDNEY DISEASE ON CHRONIC DIALYSIS, WITH LONG-TERM CURRENT USE OF INSULIN: ICD-10-CM

## 2023-06-06 DIAGNOSIS — Z91.89 AT HIGH RISK FOR INADEQUATE NUTRITIONAL INTAKE: ICD-10-CM

## 2023-06-06 DIAGNOSIS — R09.89 DECREASED PEDAL PULSES: ICD-10-CM

## 2023-06-06 DIAGNOSIS — Z99.2 TYPE 2 DIABETES MELLITUS WITH CHRONIC KIDNEY DISEASE ON CHRONIC DIALYSIS, WITH LONG-TERM CURRENT USE OF INSULIN: Primary | ICD-10-CM

## 2023-06-06 DIAGNOSIS — M79.604 RIGHT LEG PAIN: ICD-10-CM

## 2023-06-06 DIAGNOSIS — Z79.4 TYPE 2 DIABETES MELLITUS WITH HYPOGLYCEMIA AND COMA, WITH LONG-TERM CURRENT USE OF INSULIN: ICD-10-CM

## 2023-06-06 DIAGNOSIS — E11.59 HYPERTENSION ASSOCIATED WITH DIABETES: ICD-10-CM

## 2023-06-06 DIAGNOSIS — E11.641 TYPE 2 DIABETES MELLITUS WITH HYPOGLYCEMIA AND COMA, WITH LONG-TERM CURRENT USE OF INSULIN: ICD-10-CM

## 2023-06-06 DIAGNOSIS — N18.6 TYPE 2 DIABETES MELLITUS WITH CHRONIC KIDNEY DISEASE ON CHRONIC DIALYSIS, WITH LONG-TERM CURRENT USE OF INSULIN: Primary | ICD-10-CM

## 2023-06-06 DIAGNOSIS — L97.522 ULCER OF LEFT FOOT WITH FAT LAYER EXPOSED: Primary | ICD-10-CM

## 2023-06-06 DIAGNOSIS — N18.6 TYPE 2 DIABETES MELLITUS WITH CHRONIC KIDNEY DISEASE ON CHRONIC DIALYSIS, WITH LONG-TERM CURRENT USE OF INSULIN: ICD-10-CM

## 2023-06-06 DIAGNOSIS — Z91.89 AT RISK FOR READMISSION TO HOSPITAL: ICD-10-CM

## 2023-06-06 DIAGNOSIS — L02.619 CELLULITIS AND ABSCESS OF FOOT: ICD-10-CM

## 2023-06-06 DIAGNOSIS — I73.9 PERIPHERAL VASCULAR DISEASE: ICD-10-CM

## 2023-06-06 DIAGNOSIS — L08.9 DIABETIC FOOT INFECTION: ICD-10-CM

## 2023-06-06 DIAGNOSIS — R26.2 DIFFICULTY IN WALKING, NOT ELSEWHERE CLASSIFIED: ICD-10-CM

## 2023-06-06 DIAGNOSIS — E11.22 TYPE 2 DIABETES MELLITUS WITH CHRONIC KIDNEY DISEASE ON CHRONIC DIALYSIS, WITH LONG-TERM CURRENT USE OF INSULIN: Primary | ICD-10-CM

## 2023-06-06 DIAGNOSIS — Z79.4 TYPE 2 DIABETES MELLITUS WITH CHRONIC KIDNEY DISEASE ON CHRONIC DIALYSIS, WITH LONG-TERM CURRENT USE OF INSULIN: Primary | ICD-10-CM

## 2023-06-06 DIAGNOSIS — E11.628 DIABETIC FOOT INFECTION: ICD-10-CM

## 2023-06-06 DIAGNOSIS — E11.649 TYPE 2 DIABETES MELLITUS WITH HYPOGLYCEMIA UNAWARENESS: ICD-10-CM

## 2023-06-06 DIAGNOSIS — N18.6 ESRD (END STAGE RENAL DISEASE): ICD-10-CM

## 2023-06-06 DIAGNOSIS — Z89.432 HISTORY OF AMPUTATION OF LEFT FOOT: ICD-10-CM

## 2023-06-06 DIAGNOSIS — F41.1 GAD (GENERALIZED ANXIETY DISORDER): ICD-10-CM

## 2023-06-06 DIAGNOSIS — L97.513 ULCER OF RIGHT FOOT WITH NECROSIS OF MUSCLE: ICD-10-CM

## 2023-06-06 PROCEDURE — 3077F PR MOST RECENT SYSTOLIC BLOOD PRESSURE >= 140 MM HG: ICD-10-PCS | Mod: CPTII,,, | Performed by: PODIATRIST

## 2023-06-06 PROCEDURE — 99214 PR OFFICE/OUTPT VISIT, EST, LEVL IV, 30-39 MIN: ICD-10-PCS | Mod: 25,,, | Performed by: PODIATRIST

## 2023-06-06 PROCEDURE — 4010F ACE/ARB THERAPY RXD/TAKEN: CPT | Mod: CPTII,S$GLB,, | Performed by: FAMILY MEDICINE

## 2023-06-06 PROCEDURE — 3080F PR MOST RECENT DIASTOLIC BLOOD PRESSURE >= 90 MM HG: ICD-10-PCS | Mod: CPTII,,, | Performed by: PODIATRIST

## 2023-06-06 PROCEDURE — 3078F PR MOST RECENT DIASTOLIC BLOOD PRESSURE < 80 MM HG: ICD-10-PCS | Mod: CPTII,S$GLB,, | Performed by: FAMILY MEDICINE

## 2023-06-06 PROCEDURE — 3008F PR BODY MASS INDEX (BMI) DOCUMENTED: ICD-10-PCS | Mod: CPTII,S$GLB,, | Performed by: FAMILY MEDICINE

## 2023-06-06 PROCEDURE — 99999 PR PBB SHADOW E&M-EST. PATIENT-LVL V: CPT | Mod: PBBFAC,,, | Performed by: FAMILY MEDICINE

## 2023-06-06 PROCEDURE — 4010F PR ACE/ARB THEARPY RXD/TAKEN: ICD-10-PCS | Mod: CPTII,,, | Performed by: PODIATRIST

## 2023-06-06 PROCEDURE — 11043 DBRDMT MUSC&/FSCA 1ST 20/<: CPT | Performed by: PODIATRIST

## 2023-06-06 PROCEDURE — 11043 PR DEBRIDEMENT, SKIN, SUB-Q TISSUE,MUSCLE,=<20 SQ CM: ICD-10-PCS | Mod: ,,, | Performed by: PODIATRIST

## 2023-06-06 PROCEDURE — 87070 CULTURE OTHR SPECIMN AEROBIC: CPT | Performed by: PODIATRIST

## 2023-06-06 PROCEDURE — 99215 OFFICE O/P EST HI 40 MIN: CPT | Mod: 25 | Performed by: PODIATRIST

## 2023-06-06 PROCEDURE — 1160F RVW MEDS BY RX/DR IN RCRD: CPT | Mod: CPTII,,, | Performed by: PODIATRIST

## 2023-06-06 PROCEDURE — 3080F DIAST BP >= 90 MM HG: CPT | Mod: CPTII,,, | Performed by: PODIATRIST

## 2023-06-06 PROCEDURE — 3077F SYST BP >= 140 MM HG: CPT | Mod: CPTII,,, | Performed by: PODIATRIST

## 2023-06-06 PROCEDURE — 80053 COMPREHEN METABOLIC PANEL: CPT | Performed by: FAMILY MEDICINE

## 2023-06-06 PROCEDURE — 3008F BODY MASS INDEX DOCD: CPT | Mod: CPTII,S$GLB,, | Performed by: FAMILY MEDICINE

## 2023-06-06 PROCEDURE — 99214 OFFICE O/P EST MOD 30 MIN: CPT | Mod: 25,,, | Performed by: PODIATRIST

## 2023-06-06 PROCEDURE — 83036 HEMOGLOBIN GLYCOSYLATED A1C: CPT | Performed by: FAMILY MEDICINE

## 2023-06-06 PROCEDURE — 99214 OFFICE O/P EST MOD 30 MIN: CPT | Mod: S$GLB,,, | Performed by: FAMILY MEDICINE

## 2023-06-06 PROCEDURE — 3077F SYST BP >= 140 MM HG: CPT | Mod: CPTII,S$GLB,, | Performed by: FAMILY MEDICINE

## 2023-06-06 PROCEDURE — 99214 PR OFFICE/OUTPT VISIT, EST, LEVL IV, 30-39 MIN: ICD-10-PCS | Mod: S$GLB,,, | Performed by: FAMILY MEDICINE

## 2023-06-06 PROCEDURE — 99999 PR PBB SHADOW E&M-EST. PATIENT-LVL V: ICD-10-PCS | Mod: PBBFAC,,, | Performed by: FAMILY MEDICINE

## 2023-06-06 PROCEDURE — 1159F PR MEDICATION LIST DOCUMENTED IN MEDICAL RECORD: ICD-10-PCS | Mod: CPTII,S$GLB,, | Performed by: FAMILY MEDICINE

## 2023-06-06 PROCEDURE — 1159F PR MEDICATION LIST DOCUMENTED IN MEDICAL RECORD: ICD-10-PCS | Mod: CPTII,,, | Performed by: PODIATRIST

## 2023-06-06 PROCEDURE — 1160F PR REVIEW ALL MEDS BY PRESCRIBER/CLIN PHARMACIST DOCUMENTED: ICD-10-PCS | Mod: CPTII,,, | Performed by: PODIATRIST

## 2023-06-06 PROCEDURE — 11043 DBRDMT MUSC&/FSCA 1ST 20/<: CPT | Mod: ,,, | Performed by: PODIATRIST

## 2023-06-06 PROCEDURE — 1159F MED LIST DOCD IN RCRD: CPT | Mod: CPTII,S$GLB,, | Performed by: FAMILY MEDICINE

## 2023-06-06 PROCEDURE — 4010F PR ACE/ARB THEARPY RXD/TAKEN: ICD-10-PCS | Mod: CPTII,S$GLB,, | Performed by: FAMILY MEDICINE

## 2023-06-06 PROCEDURE — 36415 COLL VENOUS BLD VENIPUNCTURE: CPT | Mod: PO | Performed by: FAMILY MEDICINE

## 2023-06-06 PROCEDURE — 3078F DIAST BP <80 MM HG: CPT | Mod: CPTII,S$GLB,, | Performed by: FAMILY MEDICINE

## 2023-06-06 PROCEDURE — 3077F PR MOST RECENT SYSTOLIC BLOOD PRESSURE >= 140 MM HG: ICD-10-PCS | Mod: CPTII,S$GLB,, | Performed by: FAMILY MEDICINE

## 2023-06-06 PROCEDURE — 4010F ACE/ARB THERAPY RXD/TAKEN: CPT | Mod: CPTII,,, | Performed by: PODIATRIST

## 2023-06-06 PROCEDURE — 87075 CULTR BACTERIA EXCEPT BLOOD: CPT | Performed by: PODIATRIST

## 2023-06-06 PROCEDURE — 1159F MED LIST DOCD IN RCRD: CPT | Mod: CPTII,,, | Performed by: PODIATRIST

## 2023-06-06 RX ORDER — INSULIN PUMP SYRINGE, 3 ML
EACH MISCELLANEOUS
Qty: 1 EACH | Refills: 0 | Status: SHIPPED | OUTPATIENT
Start: 2023-06-06 | End: 2024-06-05

## 2023-06-06 RX ORDER — SEMAGLUTIDE 0.68 MG/ML
0.25 INJECTION, SOLUTION SUBCUTANEOUS
Qty: 1.5 ML | Refills: 2 | Status: SHIPPED | OUTPATIENT
Start: 2023-06-06 | End: 2023-09-04

## 2023-06-06 RX ORDER — LANCETS
EACH MISCELLANEOUS
Qty: 100 EACH | Refills: 11 | Status: SHIPPED | OUTPATIENT
Start: 2023-06-06

## 2023-06-06 RX ORDER — DOXYCYCLINE 100 MG/1
100 CAPSULE ORAL 2 TIMES DAILY
Qty: 28 CAPSULE | Refills: 0 | Status: SHIPPED | OUTPATIENT
Start: 2023-06-06 | End: 2024-02-12

## 2023-06-06 RX ORDER — ESCITALOPRAM OXALATE 10 MG/1
10 TABLET ORAL DAILY
Qty: 30 TABLET | Refills: 11 | Status: SHIPPED | OUTPATIENT
Start: 2023-06-06 | End: 2024-01-22

## 2023-06-06 RX ORDER — CYCLOBENZAPRINE HCL 5 MG
5 TABLET ORAL 3 TIMES DAILY PRN
Qty: 30 TABLET | Refills: 0 | Status: SHIPPED | OUTPATIENT
Start: 2023-06-06 | End: 2023-06-16

## 2023-06-06 NOTE — PROGRESS NOTES
[address] [facility] [address]  [place] [address]  Phone: [phone]   [phone]    Patient's PCP:Stefano Lopez MD  Referring Provider: Aaareferral Self    Subjective:      Chief Complaint:: Diabetes, Diabetic Foot Ulcer (Right dorsal foot and left plantar foot), Wound Infection, Wound Consult, Wound Care, Wound Check, Numbness, Non-healing Wound, Pressure Ulcer, Foot Ulcer, and Difficulty Walking    HPI  Leanna García is a 57 y.o. female who presents today with a complaint of right dorsal foot diabetic foot ulcer and reoccurring left plantar foot diabetic foot.  See wound docs documentation for full assessment and evaluation of all wounds.     Reviewed all notes from patients medical chart from last several months, including imaging, procedures, studies, progress notes,  cultures, antibiotic regimens, photos,  etc.     1. Debridement of right dorsal foot diabetic foot.See Wound Docs for assessment of wounds and procedure notes  2. Continue taking all medications as prescribed  3. Dressing changes, see Wound docs for dressing change orders  4. RTC one week   5. Counseled patient on increasing protein intake, not getting wound wet, keeping dressing clean dry and intact, following a healthy diet, elevating legs when able, removing pressure from wound    Total time spent for E&M 40  Total time for debridement 35 minutes     Proper ulcer care and the possible need for serial debridements, topical medications, specific dressings and biological engineered skin substitutes if indicated.      Counseling/Education:  I provided patient education verbally regarding:   The aspects of diabetes and how it pertains to the feet. I explained the importance of proper diabetic foot care and how it is essential for the health of their feet.    I discussed the importance of knowing their Hemoglobin A1c and that the level needs to be as close to 6 as possible. I discussed the increase complications of high blood sugar  including stroke, blindness, heart attack, kidney failure and loss of limb secondary to neuropathy and PVD.     With neuropathy, beware of any breaks in the skin or redness. These areas are not recognized early due to the numbness.    I discussed Diabetes, lower back issues, metabolic disorders, systemic causes, chemotherapy, vitamin deficiency, heavy metal exposure, as some of the causes. I also explained that as much as 40% of the time we can not find a cause. I discussed different treatments available to control the symptoms but which may not cure the problem.       Counseled patient on the aspects of diabetes and how it pertains to the feet.  I explained the importance of proper diabetic foot care and how it is essential for the health of their feet.      Shoe inspection. Patient instructed on proper foot hygeine. We discussed wearing proper shoe gear, daily foot inspections, never walking without protective shoe gear, never putting sharp instruments to feet, routine podiatric nail visits every 2-3 months.        Patient should call the office immediately if any signs of infection, such as fever, chills, sweats, increased redness or pain.    Patient was instructed to call the clinic or go to the emergency department if their symptoms do not improve, worsens, or if new symptoms develop.  Patient was advised that if any increased swelling, pain, or numbness arise to go immediately to the ED. Patient knows to call any time if an emergency arises. Shared decision making occurred and patient verbalized understanding in agreement with this plan.       >50% of this > 75 minute visit was spent face to face educating/counseling the patient    I spent a total of 75 minutes on the day of the visit.This includes face to face time and non-face to face time preparing to see the patient (eg, review of tests), obtaining and/or reviewing separately obtained history, documenting clinical information in the electronic or other  health record, independently interpreting results and communicating results to the patient/family/caregiver, or care coordinator.       Much of the documentation for this visit was completed in the Wound Docs system.  Please see the attached documentation for further details about the patient's care. Scanned under the Media tab.        Alivia Bruno DPM       Vitals:    06/06/23 1210   BP: (!) 159/90   Pulse: 73   Resp: 19   Temp: 98.2 °F (36.8 °C)   TempSrc: Temporal   PainSc: 0-No pain   PainLoc: Foot      Shoe Size:     Past Surgical History:   Procedure Laterality Date    ANGIOGRAPHY OF LOWER EXTREMITY Left 10/18/2022    Procedure: Angiogram Extremity Unilateral;  Surgeon: Ali Khoobehi, MD;  Location: [facility] CATH/EP LAB;  Service: Vascular;  Laterality: Left;    AV FISTULA PLACEMENT Left 8/29/2022    Procedure: CREATION, AV FISTULA;  Surgeon: Ali Khoobehi, MD;  Location: [facility] OR;  Service: Vascular;  Laterality: Left;    BONE BIOPSY Left 8/24/2022    Procedure: Biopsy-Bone;  Surgeon: Porfirio Ponce DPM;  Location: [facility] OR;  Service: Podiatry;  Laterality: Left;    COLONOSCOPY N/A 10/5/2016    Procedure: COLONOSCOPY;  Surgeon: Pillo Chanel MD;  Location: [facility] ENDO;  Service: Endoscopy;  Laterality: N/A;    COLONOSCOPY N/A 4/26/2022    Procedure: COLONOSCOPY;  Surgeon: Saravanan Rachel MD;  Location: [facility] ENDO;  Service: Endoscopy;  Laterality: N/A;    FISTULOGRAM Left 8/24/2022    Procedure: Fistulogram;  Surgeon: Ali Khoobehi, MD;  Location: [facility] CATH/EP LAB;  Service: Vascular;  Laterality: Left;    HERNIA REPAIR Bilateral 11/22/2016    inguinal    HYSTERECTOMY      INCISION AND DRAINAGE FOOT Left 5/13/2022    Procedure: INCISION AND DRAINAGE, FOOT;  Surgeon: Ricardo Bruno DPM;  Location: [facility] OR;  Service: Podiatry;  Laterality: Left;    OOPHORECTOMY      THROMBECTOMY, AV FISTULA, UPPER EXTREMITY, PERCUTANEOUS  8/24/2022    Procedure: THROMBECTOMY, AV FISTULA, UPPER EXTREMITY, PERCUTANEOUS;  Surgeon:  Ali Khoobehi, MD;  Location: [facility] CATH/EP LAB;  Service: Vascular;;    TOE AMPUTATION Left 8/26/2022    Procedure: AMPUTATION, TOE;  Surgeon: Porfirio Ponce DPM;  Location: [facility] OR;  Service: Podiatry;  Laterality: Left;     Past Medical History:   Diagnosis Date    A-fib     resolved per patient    Anemia due to end stage renal disease 6/30/2022    Arthritis     Bronchitis     Cardiovascular event risk, ASCVD 10-year risk 6.7% 10/15/2016    Diabetes mellitus type II     Diabetic foot infection     Diabetic ulcer of left midfoot 05/05/2022    Disorder of kidney and ureter     Encounter for blood transfusion     ESRD (end stage renal disease) 05/18/2018    MANJINDER (generalized anxiety disorder) 10/25/2016    History of colon polyps 11/02/2016    Hyperlipidemia     Hypertension     Stroke      Family History   Problem Relation Age of Onset    Hyperlipidemia Mother     Hypertension Mother     Hypertension Father     Diabetes Father     Diabetes Sister     Diabetes Sister     Diabetes Maternal Aunt     Diabetes Maternal Grandmother     Heart disease Maternal Grandmother     Cancer Paternal Grandmother     Breast cancer Neg Hx     Colon cancer Neg Hx     Ovarian cancer Neg Hx         Social History:   Marital Status:   Alcohol History:  reports no history of alcohol use.  Tobacco History:  reports that she has never smoked. She has never used smokeless tobacco.  Drug History:  reports no history of drug use.    Review of patient's allergies indicates:   Allergen Reactions    Dilaudid [hydromorphone] Nausea And Vomiting    Chlorhexidine Itching    Lortab [hydrocodone-acetaminophen] Itching       Current Outpatient Medications   Medication Sig Dispense Refill    acetaminophen (TYLENOL) 325 MG tablet Take 325 mg by mouth every 6 (six) hours.      ALPRAZolam (XANAX) 0.5 MG tablet Take 1 tablet by mouth.      atorvastatin (LIPITOR) 80 MG tablet Take 1 tablet (80 mg total) by mouth once daily. 90 tablet 3    azelastine  (ASTELIN) 137 mcg (0.1 %) nasal spray 1 spray (137 mcg total) by Nasal route 2 (two) times daily. 30 mL 3    blood sugar diagnostic Strp To check BG 3 times daily, to use with insurance preferred meter 100 strip 1    blood sugar diagnostic Strp To check BG 4 times daily, to use with insurance preferred meter 450 strip 3    blood sugar diagnostic Strp To check BG 1 times daily, to use with insurance preferred meter 100 each 11    blood-glucose meter kit To check BG 3 times daily, to use with insurance preferred meter 1 each 0    blood-glucose meter kit To check BG 3 times daily, to use with insurance preferred meter 1 each 0    cetirizine (ZYRTEC) 10 MG tablet Take 1 tablet (10 mg total) by mouth every other day. 45 tablet 3    clopidogreL (PLAVIX) 75 mg tablet Take 1 tablet (75 mg total) by mouth once daily. 90 tablet 3    cyclobenzaprine (FLEXERIL) 5 MG tablet Take 1 tablet (5 mg total) by mouth 3 (three) times daily as needed for Muscle spasms. 30 tablet 0    doxycycline (MONODOX) 100 MG capsule Take 1 capsule (100 mg total) by mouth 2 (two) times daily. 20 capsule 0    doxycycline (MONODOX) 100 MG capsule Take 1 capsule (100 mg total) by mouth 2 (two) times daily. for 14 days 28 capsule 0    epoetin chris-epbx (RETACRIT) 4,000 unit/mL injection Inject 1.11 mLs (4,440 Units total) into the skin every Mon, Wed, Fri.      EScitalopram oxalate (LEXAPRO) 10 MG tablet Take 1 tablet (10 mg total) by mouth once daily. 30 tablet 11    fluticasone propionate (FLONASE) 50 mcg/actuation nasal spray 2 sprays (100 mcg total) by Each Nostril route once daily. 16 g 3    gabapentin (NEURONTIN) 100 MG capsule Take 1 capsule (100 mg total) by mouth 2 (two) times daily. 180 capsule 3    hydrALAZINE (APRESOLINE) 25 MG tablet Take 1 tablet (25 mg total) by mouth 2 (two) times daily as needed (Systolic blood pressure > 170 mm Hg).      lancets Misc To check BG 3 times daily, to use with insurance preferred meter 100 each 1    lancets  "Misc To check BG 1 times daily, to use with insurance preferred meter 100 each 11    minoxidiL (LONITEN) 10 MG Tab Take 10 mg by mouth 2 (two) times daily.      multivitamin (THERAGRAN) per tablet Take 1 tablet by mouth.      pen needle, diabetic (BD ULTRA-FINE ROBERT PEN NEEDLE) 32 gauge x 5/32" Ndle 1 pen by Misc.(Non-Drug; Combo Route) route 4 (four) times daily. To use 4 times per day with insulin injections. 100 each 1    semaglutide (OZEMPIC) 0.25 mg or 0.5 mg (2 mg/3 mL) pen injector Inject 0.25 mg into the skin every 7 days. 1.5 mL 2    sucroferric oxyhydroxide (VELPHORO) 500 mg Chew Take 3 tablets by mouth.       No current facility-administered medications for this visit.       Review of Systems   Constitutional:  Negative for chills, fatigue, fever and unexpected weight change.   HENT:  Negative for hearing loss and trouble swallowing.    Eyes:  Negative for photophobia and visual disturbance.   Respiratory:  Negative for cough, shortness of breath and wheezing.    Cardiovascular:  Negative for chest pain, palpitations and leg swelling.   Gastrointestinal:  Negative for abdominal pain and nausea.   Genitourinary:  Negative for dysuria and frequency.   Musculoskeletal:  Positive for gait problem. Negative for arthralgias, back pain, joint swelling and myalgias.   Skin:  Positive for wound. Negative for rash.   Neurological:  Positive for numbness. Negative for tremors, seizures, weakness and headaches.   Hematological:  Does not bruise/bleed easily.       Objective:        Physical Exam:   Foot Exam  Physical Exam  Ortho/SPM Exam     Imaging:            Assessment:       1. Ulcer of left foot with fat layer exposed    2. History of amputation of left foot    3. Peripheral vascular disease    4. Type 2 diabetes mellitus with chronic kidney disease on chronic dialysis, with long-term current use of insulin    5. History of foot ulcer    6. 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 At risk " for readmission to hospital    8. At high risk for inadequate nutritional intake    9. Difficulty in walking, not elsewhere classified    10. Bilateral lower extremity edema    11. ESRD (end stage renal disease)    12. Type 2 diabetes mellitus with hypoglycemia and coma, with long-term current use of insulin    13. Decreased pedal pulses    14. Hypertension associated with diabetes    15. Ulcer of right foot with necrosis of muscle    16. Diabetic foot infection    17. 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    At risk for readmission to hospital    At high risk for inadequate nutritional intake    Difficulty in walking, not elsewhere classified    Bilateral lower extremity edema    ESRD (end stage renal disease)    Type 2 diabetes mellitus with hypoglycemia and coma, with long-term current use of insulin    Decreased pedal pulses    Hypertension associated with diabetes    Ulcer of right foot with necrosis of muscle    Diabetic foot infection    Cellulitis and abscess of foot  -     CULTURE, AEROBIC  (SPECIFY SOURCE)  -     CULTURE, ANAEROBE    Other orders  -     doxycycline (MONODOX) 100 MG capsule; Take 1 capsule (100 mg total) by mouth 2 (two) times daily. for 14 days  Dispense: 28 capsule; Refill: 0      Follow up in about 1 week (around 6/13/2023).    Procedures          Counseling:     I provided patient education verbally regarding:   Patient diagnosis, treatment options, as well as alternatives, risks, and benefits.     This note was created using Dragon voice recognition software that occasionally misinterpreted phrases or words.

## 2023-06-06 NOTE — TELEPHONE ENCOUNTER
----- Message from Ashlie Underwood sent at 6/6/2023  2:36 PM CDT -----  Regarding: pharmacy  Contact: [facility] with [facility]  Type:  Pharmacy Calling to Clarify an RX    Name of Caller:  Phi  Pharmacy Name:  [facility]  Prescription Name:  diabetic testing supplies  What do they need to clarify?:  directions  Best Call Back Number:  [phone]  Additional Information:  Please call [facility] to advise.  Thanks!

## 2023-06-06 NOTE — PATIENT INSTRUCTIONS
Meir Ram,     If you are due for any health screening(s) below please notify me so we can arrange them to be ordered and scheduled to maintain your health. Most healthy patients complete it. Don't lose out on improving your health.     Tests to Keep You Healthy    Mammogram: ORDERED BUT NOT SCHEDULED  Eye Exam: ORDERED BUT NOT SCHEDULED  Colon Cancer Screening: Met on 4/26/2022  Last HbA1c < 8 (04/20/2023): Yes      Breast Cancer Screening    Breast cancer is the second most common cancer in women after skin cancer, and the second leading cause of death from cancer after lung cancer. Mammograms can detect breast cancer early, which significantly increases the chances of curing the cancer.      A screening mammogram is an x-ray image of the breasts used for early breast cancer detection. It can help reduce the number of deaths from breast cancer among women. To get a clear image, the breast is placed between two plastic plates to make it flat. How often a mammogram is needed depends on your age and your breast cancer risk.            Diabetic Retinal Eye Exam    Diabetes is the #1 cause of blindness in the [place] - early detection before signs or symptoms develop can prevent debilitating blindness.    Once-a-year screening is recommended for all diabetic patients. This exam can prevent and treat diabetes complications in the eye before developing symptoms. This can be done with a special camera is used to take photographs of the back of your eye without having to dilate them, or you can see an eye doctor for a full dilated exam.    Diabetic A1c Testing    Your chart identifies you as having diabetes. It is recommended that all diabetes patients have an A1c test done at least once a year, but [facility] recommends twice a year for most patients. This helps your doctor to better help you manage your diabetes. This is a non-fasting lab test which can be completed at any time. Your result will be sent to your  "Primary Care Provider for review and that office will contact you with the results.      Patient Education       Checking Your Blood Pressure at Home   The Basics   Written by the doctors and editors at [facility]   How is blood pressure measured? -- Blood pressure is usually measured with a device that goes around your upper arm. This is often done in a doctor's office. But some people also check their blood pressure themselves, at home or at work.  Blood pressure is explained with 2 numbers. For instance, your blood pressure might be "140 over 90." The first (top) number is the pressure inside your arteries when your heart is binh. The second (bottom) number is the pressure inside your arteries when your heart is relaxed. The table shows how doctors and nurses define high and normal blood pressure (table 1).  If your blood pressure gets too high, it puts you at risk for heart attack, stroke, and kidney disease. High blood pressure does not usually cause symptoms. But it can be serious.  What is a home blood pressure meter? -- A home blood pressure meter (or "monitor") is a device you can use to check your blood pressure yourself. It has a cuff that goes around your upper arm (figure 1). Some devices have a cuff that goes around your wrist instead. But doctors aren't sure if these work as well. The meter also has a small screen, or dial, that shows your blood pressure numbers.  There are also special meters you can wear for a day or 2. These are different because they automatically check your blood pressure throughout the day and night, even while you are sleeping. If your doctor thinks you should use one of these devices, they will talk to you about how to wear it.  Why do I need to check my blood pressure at home? -- If your doctor knows or suspects that you have high blood pressure, they might want you to check it at home. There are a few reasons for this. Your doctor might want to look at:  Whether your " blood pressure measures the same at home as it did in the doctor's office  How well your blood pressure medicines are working  Changes in your blood pressure, for example, if it goes up and down  People who check their own blood pressure at home usually do better at keeping it low.  How do I choose a home blood pressure meter? -- When choosing a home blood pressure meter, you will probably want to think about:  Cost - Some devices cost more than others. You should also check to see if your insurance will help pay for your device.  Size - It's important to make sure the cuff fits your arm comfortably. Your doctor or nurse can help you with this.  How easy it is to use - You should make sure you understand how to use the device. You also need to be able to read the numbers on the screen.  You do not need a prescription to buy a home blood pressure meter. You can buy them at most pharmacies or over the internet. Your doctor or nurse can help you choose the right device for you.  How do I check my blood pressure at home? -- Once you have a home blood pressure meter, your doctor or nurse should check it to make sure it fits you and works correctly.  When it's time to check your blood pressure:  Go to the bathroom and empty your bladder first. Having a full bladder can temporarily increase your blood pressure, making the results inaccurate.  Sit in a chair with your feet flat on the ground.  Try to breathe normally and stay calm.  Attach the cuff to your arm. Place the cuff directly on your skin, not over your clothing. The cuff should be tight enough to not slip down, but not uncomfortably tight.  Sit and relax for about 3 to 5 minutes with the cuff on.  Follow the directions that came with your device to start measuring your blood pressure. This might involve squeezing the bulb at the end of the tube to inflate the cuff (fill it with air). With some monitors, you just need to press a button to inflate the cuff. When the  cuff fills with air, it feels like someone is squeezing your arm, but it should not hurt. Then you will slowly deflate the cuff (let the air out of it), or it will deflate by itself. The screen or dial will show your blood pressure numbers.  Stay seated and relax for 1 minute, then measure your blood pressure again.  How often should I check my blood pressure? -- It depends. Different people need to follow different schedules. Your doctor or nurse will tell you how often to check your blood pressure, and when. Some people need to check their blood pressure twice a day, in the morning and evening.  Your doctor or nurse will probably tell you to keep track of your blood pressure for at least a few days (table 2). Then they will look at the numbers. The reason for this is that it's normal for your blood pressure to change a bit from day to day. For example, the numbers might change depending on whether you recently had caffeine, just exercised, or feel stressed. Checking your blood pressure over several days - or longer - will give your doctor or nurse a better idea of what is average for you.  How should I keep track of my blood pressure? -- Some blood pressure meters will record your numbers for you, or send them to your computer or smartphone. If yours does not do this, you will need to write them down. Your doctor or nurse can help you figure out the best way to keep track of the numbers.  What if my blood pressure is high? -- Your doctor or nurse will tell you what to do if your blood pressure is high when you check it at home. If you get a number that is higher than normal, measure it again to see if it is still high. If it is very high (above a certain number, which your doctor or nurse will tell you to watch out for), you should call your doctor right away.  If your blood pressure is only a little high, your doctor or nurse might tell you to keep checking it for a few more days or weeks, and then call if it  "does not go back down. Then they can help you decide what to do next.  All topics are updated as new evidence becomes available and our peer review process is complete.  This topic retrieved from [employer] on: Sep 21, 2021.  Topic 397884 Version 4.0  Release: 29.4.2 - C29.263  © 2021 UpToDate, Inc. and/or its affiliates. All rights reserved.  table 1: Definition of normal and high blood pressure  Level  Top number  Bottom number    High 130 or above 80 or above   Elevated 120 to 129 79 or below   Normal 119 or below 79 or below   These definitions are from the American College of Cardiology/American Heart Association. Other expert groups might use slightly different definitions.  "Elevated blood pressure" is a term doctor or nurses use as a warning. It means you do not yet have high blood pressure, but your blood pressure is not as low as it should be for good health.  Graphic 28404 Version 6.0  figure 1: Using a home blood pressure meter     This is an example of a person using a home blood pressure meter.  Graphic 986675 Version 1.0    table 2: 7-day diary for checking blood pressure at home  Day 1  Day 2  Day 3  Day 4  Day 5  Day 6  Day 7    Morning  1st read Morning  1st read Morning  1st read Morning  1st read Morning  1st read Morning  1st read Morning  1st read   Systolic: __________ Systolic: __________ Systolic: __________ Systolic: __________ Systolic: __________ Systolic: __________ Systolic: __________   Diastolic: __________ Diastolic: __________ Diastolic: __________ Diastolic: __________ Diastolic: __________ Diastolic: __________ Diastolic: __________   Pulse: __________ Pulse: __________ Pulse: __________ Pulse: __________ Pulse: __________ Pulse: __________ Pulse: __________   Morning  2nd read Morning  2nd read Morning  2nd read Morning  2nd read Morning  2nd read Morning  2nd read Morning  2nd read   Systolic: __________ Systolic: __________ Systolic: __________ Systolic: __________ Systolic: " __________ Systolic: __________ Systolic: __________   Diastolic: __________ Diastolic: __________ Diastolic: __________ Diastolic: __________ Diastolic: __________ Diastolic: __________ Diastolic: __________   Pulse: __________ Pulse: __________ Pulse: __________ Pulse: __________ Pulse: __________ Pulse: __________ Pulse: __________   Evening  1st read Evening  1st read Evening  1st read Evening  1st read Evening  1st read Evening  1st read Evening  1st read   Systolic: __________ Systolic: __________ Systolic: __________ Systolic: __________ Systolic: __________ Systolic: __________ Systolic: __________   Diastolic: __________ Diastolic: __________ Diastolic: __________ Diastolic: __________ Diastolic: __________ Diastolic: __________ Diastolic: __________   Pulse: __________ Pulse: __________ Pulse: __________ Pulse: __________ Pulse: __________ Pulse: __________ Pulse: __________   Evening  2nd read Evening  2nd read Evening  2nd read Evening  2nd read Evening  2nd read Evening  2nd read Evening  2nd read   Systolic: __________ Systolic: __________ Systolic: __________ Systolic: __________ Systolic: __________ Systolic: __________ Systolic: __________   Diastolic: __________ Diastolic: __________ Diastolic: __________ Diastolic: __________ Diastolic: __________ Diastolic: __________ Diastolic: __________   Pulse: __________ Pulse: __________ Pulse: __________ Pulse: __________ Pulse: __________ Pulse: __________ Pulse: __________   Notes    Notes    Notes    Notes    Notes    Notes    Notes      ____________________ ____________________ ____________________ ____________________ ____________________ ____________________ ____________________   ____________________ ____________________ ____________________ ____________________ ____________________ ____________________ ____________________   ____________________ ____________________ ____________________ ____________________ ____________________ ____________________  ____________________   Patient name: ______________________________     Patient ID: ________________________________    Primary care provider: _______________________    Average BP: _______________________________    Graphic 240373 Version 1.0  Consumer Information Use and Disclaimer   This information is not specific medical advice and does not replace information you receive from your health care provider. This is only a brief summary of general information. It does NOT include all information about conditions, illnesses, injuries, tests, procedures, treatments, therapies, discharge instructions or life-style choices that may apply to you. You must talk with your health care provider for complete information about your health and treatment options. This information should not be used to decide whether or not to accept your health care provider's advice, instructions or recommendations. Only your health care provider has the knowledge and training to provide advice that is right for you. The use of this information is governed by the [employer] End User License Agreement, available at [email or web address]/[email or web address]/[email or web address].The use of [employer] content is governed by the [employer] Terms of Use. ©2021 UpToDate, Inc. All rights reserved.  Copyright   © 2021 UpToDate, Inc. and/or its affiliates. All rights reserved.

## 2023-06-06 NOTE — PROGRESS NOTES
Subjective:   Patient ID: Leanna García is a 57 y.o. female     Chief Complaint:No chief complaint on file.      Notes right leg pain after trip and fall 2 weeks ago. Improving. Tylenol somewhat helpful.    Review of Systems   Respiratory:  Negative for shortness of breath.    Cardiovascular:  Negative for chest pain.   Gastrointestinal:  Negative for abdominal pain.   Genitourinary:  Negative for dysuria.   Past Medical History:   Diagnosis Date    A-fib     resolved per patient    Anemia due to end stage renal disease 6/30/2022    Arthritis     Bronchitis     Cardiovascular event risk, ASCVD 10-year risk 6.7% 10/15/2016    Diabetes mellitus type II     Diabetic foot infection     Diabetic ulcer of left midfoot 05/05/2022    Disorder of kidney and ureter     Encounter for blood transfusion     ESRD (end stage renal disease) 05/18/2018    MANJINDER (generalized anxiety disorder) 10/25/2016    History of colon polyps 11/02/2016    Hyperlipidemia     Hypertension     Stroke      Past Surgical History:   Procedure Laterality Date    ANGIOGRAPHY OF LOWER EXTREMITY Left 10/18/2022    Procedure: Angiogram Extremity Unilateral;  Surgeon: Ali Khoobehi, MD;  Location: [facility] CATH/EP LAB;  Service: Vascular;  Laterality: Left;    AV FISTULA PLACEMENT Left 8/29/2022    Procedure: CREATION, AV FISTULA;  Surgeon: Ali Khoobehi, MD;  Location: [facility] OR;  Service: Vascular;  Laterality: Left;    BONE BIOPSY Left 8/24/2022    Procedure: Biopsy-Bone;  Surgeon: Porfirio Ponce DPM;  Location: [facility] OR;  Service: Podiatry;  Laterality: Left;    COLONOSCOPY N/A 10/5/2016    Procedure: COLONOSCOPY;  Surgeon: Pillo Chanel MD;  Location: [facility];  Service: Endoscopy;  Laterality: N/A;    COLONOSCOPY N/A 4/26/2022    Procedure: COLONOSCOPY;  Surgeon: Saravanan Rachel MD;  Location: [facility];  Service: Endoscopy;  Laterality: N/A;    FISTULOGRAM Left 8/24/2022    Procedure: Fistulogram;  Surgeon: Ali Khoobehi, MD;  Location: [facility]  CATH/EP LAB;  Service: Vascular;  Laterality: Left;    HERNIA REPAIR Bilateral 11/22/2016    inguinal    HYSTERECTOMY      INCISION AND DRAINAGE FOOT Left 5/13/2022    Procedure: INCISION AND DRAINAGE, FOOT;  Surgeon: Ricardo Bruno DPM;  Location: [facility] OR;  Service: Podiatry;  Laterality: Left;    OOPHORECTOMY      THROMBECTOMY, AV FISTULA, UPPER EXTREMITY, PERCUTANEOUS  8/24/2022    Procedure: THROMBECTOMY, AV FISTULA, UPPER EXTREMITY, PERCUTANEOUS;  Surgeon: Ali Khoobehi, MD;  Location: [facility] CATH/EP LAB;  Service: Vascular;;    TOE AMPUTATION Left 8/26/2022    Procedure: AMPUTATION, TOE;  Surgeon: Porfirio Ponce DPM;  Location: [facility] OR;  Service: Podiatry;  Laterality: Left;     Objective:     Vitals:    06/06/23 1304   BP: (!) 144/60   Pulse: 84   Resp: 16   Temp: 98 °F (36.7 °C)     Body mass index is 25.82 kg/m².  Physical Exam  Vitals and nursing note reviewed.   Constitutional:       Appearance: She is well-developed.   HENT:      Head: Normocephalic and atraumatic.   Eyes:      General: No scleral icterus.     Conjunctiva/sclera: Conjunctivae normal.   Cardiovascular:      Heart sounds: No murmur heard.  Pulmonary:      Effort: Pulmonary effort is normal. No respiratory distress.   Musculoskeletal:         General: No deformity. Normal range of motion.      Cervical back: Normal range of motion and neck supple.   Skin:     Coloration: Skin is not pale.      Findings: No rash.   Neurological:      Mental Status: She is alert and oriented to person, place, and time.   Psychiatric:         Behavior: Behavior normal.         Thought Content: Thought content normal.         Judgment: Judgment normal.     Assessment:     1. Type 2 diabetes mellitus with chronic kidney disease on chronic dialysis, with long-term current use of insulin    2. Right leg pain    3. MANJINDER (generalized anxiety disorder)      Plan:   Type 2 diabetes mellitus with chronic kidney disease on chronic dialysis, with long-term current use of  insulin  -     Comprehensive Metabolic Panel; Future; Expected date: 06/06/2023  -     Hemoglobin A1C; Future; Expected date: 06/06/2023  -     Lipid Panel; Future; Expected date: 09/06/2023  -     semaglutide (OZEMPIC) 0.25 mg or 0.5 mg (2 mg/3 mL) pen injector; Inject 0.25 mg into the skin every 7 days.  Dispense: 1.5 mL; Refill: 2  -     blood-glucose meter kit; To check BG 3 times daily, to use with insurance preferred meter  Dispense: 1 each; Refill: 0  -     blood sugar diagnostic Strp; To check BG 1 times daily, to use with insurance preferred meter  Dispense: 100 each; Refill: 11  -     lancets Misc; To check BG 1 times daily, to use with insurance preferred meter  Dispense: 100 each; Refill: 11  Will d/c insulin with well controlled a1c. Start low dose ozempic at request of patient. Counseled on management with diet.   Right leg pain  -     cyclobenzaprine (FLEXERIL) 5 MG tablet; Take 1 tablet (5 mg total) by mouth 3 (three) times daily as needed for Muscle spasms.  Dispense: 30 tablet; Refill: 0    MANJINDER (generalized anxiety disorder)  -     EScitalopram oxalate (LEXAPRO) 10 MG tablet; Take 1 tablet (10 mg total) by mouth once daily.  Dispense: 30 tablet; Refill: 11            Total time spent of Greater than 30 minutes minutes on the day of the visit.This includes face to face time and preparing to see the patient, obtaining and reviewing separately obtained history, documenting clinical information in the electronic or other health record, independently interpreting results and communicating results to the patient/family/caregiver, or care coordinator.    Established patient with me has been instructed that must see me at least 1 time yearly (every 365 days) for refills of medications. Seeing other providers in this clinic is fine but expectation is to see me yearly.    Stefano Lopez MD  06/06/2023    Portions of this note have been dictated with BRIAN Perdomo

## 2023-06-07 LAB
ALBUMIN SERPL BCP-MCNC: 3.9 G/DL (ref 3.5–5.2)
ALP SERPL-CCNC: 106 U/L (ref 55–135)
ALT SERPL W/O P-5'-P-CCNC: 11 U/L (ref 10–44)
ANION GAP SERPL CALC-SCNC: 15 MMOL/L (ref 8–16)
AST SERPL-CCNC: 12 U/L (ref 10–40)
BILIRUB SERPL-MCNC: 0.4 MG/DL (ref 0.1–1)
BUN SERPL-MCNC: 58 MG/DL (ref 6–20)
CALCIUM SERPL-MCNC: 9.8 MG/DL (ref 8.7–10.5)
CHLORIDE SERPL-SCNC: 93 MMOL/L (ref 95–110)
CO2 SERPL-SCNC: 30 MMOL/L (ref 23–29)
CREAT SERPL-MCNC: 6.7 MG/DL (ref 0.5–1.4)
EST. GFR  (NO RACE VARIABLE): 6.7 ML/MIN/1.73 M^2
ESTIMATED AVG GLUCOSE: 192 MG/DL (ref 68–131)
GLUCOSE SERPL-MCNC: 161 MG/DL (ref 70–110)
HBA1C MFR BLD: 8.3 % (ref 4–5.6)
POTASSIUM SERPL-SCNC: 5.2 MMOL/L (ref 3.5–5.1)
PROT SERPL-MCNC: 7.7 G/DL (ref 6–8.4)
SODIUM SERPL-SCNC: 138 MMOL/L (ref 136–145)

## 2023-06-09 LAB — BACTERIA SPEC AEROBE CULT: NORMAL

## 2023-06-12 LAB — BACTERIA SPEC ANAEROBE CULT: NORMAL

## 2023-06-13 ENCOUNTER — OFFICE VISIT (OUTPATIENT)
Dept: WOUND CARE | Facility: HOSPITAL | Age: 58
End: 2023-06-13
Attending: PODIATRIST
Payer: MEDICARE

## 2023-06-13 VITALS
RESPIRATION RATE: 16 BRPM | HEART RATE: 69 BPM | DIASTOLIC BLOOD PRESSURE: 87 MMHG | TEMPERATURE: 99 F | SYSTOLIC BLOOD PRESSURE: 166 MMHG

## 2023-06-13 DIAGNOSIS — R60.0 BILATERAL LOWER EXTREMITY EDEMA: ICD-10-CM

## 2023-06-13 DIAGNOSIS — N18.6 ESRD (END STAGE RENAL DISEASE): ICD-10-CM

## 2023-06-13 DIAGNOSIS — E11.9 COMPREHENSIVE DIABETIC FOOT EXAMINATION, TYPE 2 DM, ENCOUNTER FOR: ICD-10-CM

## 2023-06-13 DIAGNOSIS — E11.22 TYPE 2 DIABETES MELLITUS WITH CHRONIC KIDNEY DISEASE ON CHRONIC DIALYSIS, WITH LONG-TERM CURRENT USE OF INSULIN: ICD-10-CM

## 2023-06-13 DIAGNOSIS — L97.423 DIABETIC ULCER OF LEFT MIDFOOT ASSOCIATED WITH TYPE 2 DIABETES MELLITUS, WITH NECROSIS OF MUSCLE: ICD-10-CM

## 2023-06-13 DIAGNOSIS — L97.523 ULCER OF LEFT FOOT WITH NECROSIS OF MUSCLE: ICD-10-CM

## 2023-06-13 DIAGNOSIS — R26.2 DIFFICULTY IN WALKING, NOT ELSEWHERE CLASSIFIED: ICD-10-CM

## 2023-06-13 DIAGNOSIS — L02.619 CELLULITIS AND ABSCESS OF FOOT: ICD-10-CM

## 2023-06-13 DIAGNOSIS — Z91.89 AT HIGH RISK FOR INADEQUATE NUTRITIONAL INTAKE: ICD-10-CM

## 2023-06-13 DIAGNOSIS — Z99.2 TYPE 2 DIABETES MELLITUS WITH CHRONIC KIDNEY DISEASE ON CHRONIC DIALYSIS, WITH LONG-TERM CURRENT USE OF INSULIN: ICD-10-CM

## 2023-06-13 DIAGNOSIS — Z91.89 AT RISK FOR READMISSION TO HOSPITAL: ICD-10-CM

## 2023-06-13 DIAGNOSIS — E11.9 ENCOUNTER FOR DIABETIC FOOT EXAM: ICD-10-CM

## 2023-06-13 DIAGNOSIS — I73.9 PERIPHERAL VASCULAR DISEASE: ICD-10-CM

## 2023-06-13 DIAGNOSIS — L03.119 CELLULITIS AND ABSCESS OF FOOT: ICD-10-CM

## 2023-06-13 DIAGNOSIS — L08.9 DIABETIC FOOT INFECTION: ICD-10-CM

## 2023-06-13 DIAGNOSIS — Z86.73 HISTORY OF TIA (TRANSIENT ISCHEMIC ATTACK): ICD-10-CM

## 2023-06-13 DIAGNOSIS — Z79.4 TYPE 2 DIABETES MELLITUS WITH HYPOGLYCEMIA AND COMA, WITH LONG-TERM CURRENT USE OF INSULIN: ICD-10-CM

## 2023-06-13 DIAGNOSIS — L97.522 ULCER OF LEFT FOOT WITH FAT LAYER EXPOSED: ICD-10-CM

## 2023-06-13 DIAGNOSIS — Z79.4 TYPE 2 DIABETES MELLITUS WITH CHRONIC KIDNEY DISEASE ON CHRONIC DIALYSIS, WITH LONG-TERM CURRENT USE OF INSULIN: ICD-10-CM

## 2023-06-13 DIAGNOSIS — Z87.2 HISTORY OF FOOT ULCER: ICD-10-CM

## 2023-06-13 DIAGNOSIS — I15.2 HYPERTENSION ASSOCIATED WITH DIABETES: ICD-10-CM

## 2023-06-13 DIAGNOSIS — E11.641 TYPE 2 DIABETES MELLITUS WITH HYPOGLYCEMIA AND COMA, WITH LONG-TERM CURRENT USE OF INSULIN: ICD-10-CM

## 2023-06-13 DIAGNOSIS — E11.621 DIABETIC ULCER OF LEFT MIDFOOT ASSOCIATED WITH TYPE 2 DIABETES MELLITUS, WITH NECROSIS OF MUSCLE: ICD-10-CM

## 2023-06-13 DIAGNOSIS — E11.628 DIABETIC FOOT INFECTION: ICD-10-CM

## 2023-06-13 DIAGNOSIS — N18.6 TYPE 2 DIABETES MELLITUS WITH CHRONIC KIDNEY DISEASE ON CHRONIC DIALYSIS, WITH LONG-TERM CURRENT USE OF INSULIN: ICD-10-CM

## 2023-06-13 DIAGNOSIS — R09.89 DECREASED PEDAL PULSES: ICD-10-CM

## 2023-06-13 DIAGNOSIS — E11.649 TYPE 2 DIABETES MELLITUS WITH HYPOGLYCEMIA UNAWARENESS: ICD-10-CM

## 2023-06-13 DIAGNOSIS — E11.59 HYPERTENSION ASSOCIATED WITH DIABETES: ICD-10-CM

## 2023-06-13 DIAGNOSIS — Z89.432 HISTORY OF AMPUTATION OF LEFT FOOT: ICD-10-CM

## 2023-06-13 DIAGNOSIS — L97.513 ULCER OF RIGHT FOOT WITH NECROSIS OF MUSCLE: Primary | ICD-10-CM

## 2023-06-13 PROCEDURE — 99214 PR OFFICE/OUTPT VISIT, EST, LEVL IV, 30-39 MIN: ICD-10-PCS | Mod: 25,,, | Performed by: PODIATRIST

## 2023-06-13 PROCEDURE — 3077F SYST BP >= 140 MM HG: CPT | Mod: CPTII,,, | Performed by: PODIATRIST

## 2023-06-13 PROCEDURE — 1160F PR REVIEW ALL MEDS BY PRESCRIBER/CLIN PHARMACIST DOCUMENTED: ICD-10-PCS | Mod: CPTII,,, | Performed by: PODIATRIST

## 2023-06-13 PROCEDURE — 3077F PR MOST RECENT SYSTOLIC BLOOD PRESSURE >= 140 MM HG: ICD-10-PCS | Mod: CPTII,,, | Performed by: PODIATRIST

## 2023-06-13 PROCEDURE — 11042 PR DEBRIDEMENT, SKIN, SUB-Q TISSUE,=<20 SQ CM: ICD-10-PCS | Mod: 59,,, | Performed by: PODIATRIST

## 2023-06-13 PROCEDURE — 1159F MED LIST DOCD IN RCRD: CPT | Mod: CPTII,,, | Performed by: PODIATRIST

## 2023-06-13 PROCEDURE — 11042 DBRDMT SUBQ TIS 1ST 20SQCM/<: CPT | Mod: 59,,, | Performed by: PODIATRIST

## 2023-06-13 PROCEDURE — 11043 DBRDMT MUSC&/FSCA 1ST 20/<: CPT | Performed by: PODIATRIST

## 2023-06-13 PROCEDURE — 1160F RVW MEDS BY RX/DR IN RCRD: CPT | Mod: CPTII,,, | Performed by: PODIATRIST

## 2023-06-13 PROCEDURE — 11043 DBRDMT MUSC&/FSCA 1ST 20/<: CPT | Mod: ,,, | Performed by: PODIATRIST

## 2023-06-13 PROCEDURE — 1159F PR MEDICATION LIST DOCUMENTED IN MEDICAL RECORD: ICD-10-PCS | Mod: CPTII,,, | Performed by: PODIATRIST

## 2023-06-13 PROCEDURE — 3079F PR MOST RECENT DIASTOLIC BLOOD PRESSURE 80-89 MM HG: ICD-10-PCS | Mod: CPTII,,, | Performed by: PODIATRIST

## 2023-06-13 PROCEDURE — 11042 DBRDMT SUBQ TIS 1ST 20SQCM/<: CPT | Mod: 59 | Performed by: PODIATRIST

## 2023-06-13 PROCEDURE — 99214 OFFICE O/P EST MOD 30 MIN: CPT | Mod: 25,,, | Performed by: PODIATRIST

## 2023-06-13 PROCEDURE — 11043 PR DEBRIDEMENT, SKIN, SUB-Q TISSUE,MUSCLE,=<20 SQ CM: ICD-10-PCS | Mod: ,,, | Performed by: PODIATRIST

## 2023-06-13 PROCEDURE — 4010F ACE/ARB THERAPY RXD/TAKEN: CPT | Mod: CPTII,,, | Performed by: PODIATRIST

## 2023-06-13 PROCEDURE — 3052F HG A1C>EQUAL 8.0%<EQUAL 9.0%: CPT | Mod: CPTII,,, | Performed by: PODIATRIST

## 2023-06-13 PROCEDURE — 4010F PR ACE/ARB THEARPY RXD/TAKEN: ICD-10-PCS | Mod: CPTII,,, | Performed by: PODIATRIST

## 2023-06-13 PROCEDURE — 3079F DIAST BP 80-89 MM HG: CPT | Mod: CPTII,,, | Performed by: PODIATRIST

## 2023-06-13 PROCEDURE — 3052F PR MOST RECENT HEMOGLOBIN A1C LEVEL 8.0 - < 9.0%: ICD-10-PCS | Mod: CPTII,,, | Performed by: PODIATRIST

## 2023-06-14 NOTE — PROGRESS NOTES
[address] [facility] [address]  [place] [address]  Phone: [phone]   [phone]    Patient's PCP:Stefano Lopez MD  Referring Provider: Aaareferral Self    Subjective:      Chief Complaint:: Wound Care, Wound Consult, Diabetic Foot Ulcer (Right dorsal foot and left plantar foot diabetic foot ulcers), Wound Infection, Wound Check, Non-healing Wound, Pressure Ulcer, Numbness, Foot Ulcer, Difficulty Walking, Diabetes, Results (Discuss results of recent culture), and Diabetic Foot Exam    HPI  Leanna García is a 57 y.o. female who presents today with a complaint of right dorsal foot diabetic foot ulcer and left plantar foot diabetic foot.  See wound docs documentation for full assessment and evaluation of all wounds.     Additionally, patient is due for a diabetic foot exam.     Reviewed all notes from patients medical chart from last several months, including imaging, procedures, studies, progress notes,  cultures, antibiotic regimens, photos,  etc.      1. Debridement of right dorsal foot diabetic foot and Debridement of left plantar foot diabetic foot ulcer.See Wound Docs for assessment of wounds and procedure notes  2. Continue taking all medications as prescribed  3. Dressing changes, see Wound docs for dressing change orders  4. RTC one week   5. Counseled patient on increasing protein intake, not getting wound wet, keeping dressing clean dry and intact, following a healthy diet, elevating legs when able, removing pressure from wound     Total time spent for E&M 40  Total time for debridement 35 minutes      Proper ulcer care and the possible need for serial debridements, topical medications, specific dressings and biological engineered skin substitutes if indicated.        Counseling/Education:  I provided patient education verbally regarding:   The aspects of diabetes and how it pertains to the feet. I explained the importance of proper diabetic foot care and how it is essential for the health  of their feet.     I discussed the importance of knowing their Hemoglobin A1c and that the level needs to be as close to 6 as possible. I discussed the increase complications of high blood sugar including stroke, blindness, heart attack, kidney failure and loss of limb secondary to neuropathy and PVD.      With neuropathy, beware of any breaks in the skin or redness. These areas are not recognized early due to the numbness.     I discussed Diabetes, lower back issues, metabolic disorders, systemic causes, chemotherapy, vitamin deficiency, heavy metal exposure, as some of the causes. I also explained that as much as 40% of the time we can not find a cause. I discussed different treatments available to control the symptoms but which may not cure the problem.         Counseled patient on the aspects of diabetes and how it pertains to the feet.  I explained the importance of proper diabetic foot care and how it is essential for the health of their feet.        Shoe inspection. Patient instructed on proper foot hygeine. We discussed wearing proper shoe gear, daily foot inspections, never walking without protective shoe gear, never putting sharp instruments to feet, routine podiatric nail visits every 2-3 months.          Patient should call the office immediately if any signs of infection, such as fever, chills, sweats, increased redness or pain.     Patient was instructed to call the clinic or go to the emergency department if their symptoms do not improve, worsens, or if new symptoms develop.  Patient was advised that if any increased swelling, pain, or numbness arise to go immediately to the ED. Patient knows to call any time if an emergency arises. Shared decision making occurred and patient verbalized understanding in agreement with this plan.         >50% of this > 75 minute visit was spent face to face educating/counseling the patient     I spent a total of 75 minutes on the day of the visit.This includes face to  face time and non-face to face time preparing to see the patient (eg, review of tests), obtaining and/or reviewing separately obtained history, documenting clinical information in the electronic or other health record, independently interpreting results and communicating results to the patient/family/caregiver, or care coordinator.        Much of the documentation for this visit was completed in the Wound Docs system.  Please see the attached documentation for further details about the patient's care. Scanned under the Media tab.         Alivia Bruno DPM     Vitals:    06/13/23 1108   BP: (!) 166/87   Pulse: 69   Resp: 16   Temp: 98.6 °F (37 °C)   PainSc: 0-No pain      Shoe Size:     Past Surgical History:   Procedure Laterality Date    ANGIOGRAPHY OF LOWER EXTREMITY Left 10/18/2022    Procedure: Angiogram Extremity Unilateral;  Surgeon: Ali Khoobehi, MD;  Location: [facility] CATH/EP LAB;  Service: Vascular;  Laterality: Left;    AV FISTULA PLACEMENT Left 8/29/2022    Procedure: CREATION, AV FISTULA;  Surgeon: Ali Khoobehi, MD;  Location: [facility] OR;  Service: Vascular;  Laterality: Left;    BONE BIOPSY Left 8/24/2022    Procedure: Biopsy-Bone;  Surgeon: Porfirio Ponce DPM;  Location: [facility] OR;  Service: Podiatry;  Laterality: Left;    COLONOSCOPY N/A 10/5/2016    Procedure: COLONOSCOPY;  Surgeon: Pillo Chanel MD;  Location: [facility] ENDO;  Service: Endoscopy;  Laterality: N/A;    COLONOSCOPY N/A 4/26/2022    Procedure: COLONOSCOPY;  Surgeon: Saravanan Rachel MD;  Location: [facility] ENDO;  Service: Endoscopy;  Laterality: N/A;    FISTULOGRAM Left 8/24/2022    Procedure: Fistulogram;  Surgeon: Ali Khoobehi, MD;  Location: [facility] CATH/EP LAB;  Service: Vascular;  Laterality: Left;    HERNIA REPAIR Bilateral 11/22/2016    inguinal    HYSTERECTOMY      INCISION AND DRAINAGE FOOT Left 5/13/2022    Procedure: INCISION AND DRAINAGE, FOOT;  Surgeon: Ricardo Bruno DPM;  Location: [facility] OR;  Service: Podiatry;  Laterality: Left;     OOPHORECTOMY      THROMBECTOMY, AV FISTULA, UPPER EXTREMITY, PERCUTANEOUS  8/24/2022    Procedure: THROMBECTOMY, AV FISTULA, UPPER EXTREMITY, PERCUTANEOUS;  Surgeon: Ali Khoobehi, MD;  Location: [facility] CATH/EP LAB;  Service: Vascular;;    TOE AMPUTATION Left 8/26/2022    Procedure: AMPUTATION, TOE;  Surgeon: Porfirio Ponce DPM;  Location: [facility] OR;  Service: Podiatry;  Laterality: Left;     Past Medical History:   Diagnosis Date    A-fib     resolved per patient    Anemia due to end stage renal disease 6/30/2022    Arthritis     Bronchitis     Cardiovascular event risk, ASCVD 10-year risk 6.7% 10/15/2016    Diabetes mellitus type II     Diabetic foot infection     Diabetic ulcer of left midfoot 05/05/2022    Disorder of kidney and ureter     Encounter for blood transfusion     ESRD (end stage renal disease) 05/18/2018    MANJINDER (generalized anxiety disorder) 10/25/2016    History of colon polyps 11/02/2016    Hyperlipidemia     Hypertension     Stroke      Family History   Problem Relation Age of Onset    Hyperlipidemia Mother     Hypertension Mother     Hypertension Father     Diabetes Father     Diabetes Sister     Diabetes Sister     Diabetes Maternal Aunt     Diabetes Maternal Grandmother     Heart disease Maternal Grandmother     Cancer Paternal Grandmother     Breast cancer Neg Hx     Colon cancer Neg Hx     Ovarian cancer Neg Hx         Social History:   Marital Status:   Alcohol History:  reports no history of alcohol use.  Tobacco History:  reports that she has never smoked. She has never used smokeless tobacco.  Drug History:  reports no history of drug use.    Review of patient's allergies indicates:   Allergen Reactions    Dilaudid [hydromorphone] Nausea And Vomiting    Chlorhexidine Itching    Lortab [hydrocodone-acetaminophen] Itching       Current Outpatient Medications   Medication Sig Dispense Refill    acetaminophen (TYLENOL) 325 MG tablet Take 325 mg by mouth every 6 (six) hours.       ALPRAZolam (XANAX) 0.5 MG tablet Take 1 tablet by mouth.      atorvastatin (LIPITOR) 80 MG tablet Take 1 tablet (80 mg total) by mouth once daily. 90 tablet 3    azelastine (ASTELIN) 137 mcg (0.1 %) nasal spray 1 spray (137 mcg total) by Nasal route 2 (two) times daily. 30 mL 3    blood sugar diagnostic Strp To check BG 3 times daily, to use with insurance preferred meter 100 strip 1    blood sugar diagnostic Strp To check BG 4 times daily, to use with insurance preferred meter 450 strip 3    blood sugar diagnostic Strp To check BG 1 times daily, to use with insurance preferred meter 100 each 11    blood-glucose meter kit To check BG 3 times daily, to use with insurance preferred meter 1 each 0    blood-glucose meter kit To check BG 3 times daily, to use with insurance preferred meter 1 each 0    cetirizine (ZYRTEC) 10 MG tablet Take 1 tablet (10 mg total) by mouth every other day. 45 tablet 3    clopidogreL (PLAVIX) 75 mg tablet Take 1 tablet (75 mg total) by mouth once daily. 90 tablet 3    cyclobenzaprine (FLEXERIL) 5 MG tablet Take 1 tablet (5 mg total) by mouth 3 (three) times daily as needed for Muscle spasms. 30 tablet 0    doxycycline (MONODOX) 100 MG capsule Take 1 capsule (100 mg total) by mouth 2 (two) times daily. 20 capsule 0    doxycycline (MONODOX) 100 MG capsule Take 1 capsule (100 mg total) by mouth 2 (two) times daily. for 14 days 28 capsule 0    epoetin chris-epbx (RETACRIT) 4,000 unit/mL injection Inject 1.11 mLs (4,440 Units total) into the skin every Mon, Wed, Fri.      EScitalopram oxalate (LEXAPRO) 10 MG tablet Take 1 tablet (10 mg total) by mouth once daily. 30 tablet 11    fluticasone propionate (FLONASE) 50 mcg/actuation nasal spray 2 sprays (100 mcg total) by Each Nostril route once daily. 16 g 3    gabapentin (NEURONTIN) 100 MG capsule Take 1 capsule (100 mg total) by mouth 2 (two) times daily. 180 capsule 3    hydrALAZINE (APRESOLINE) 25 MG tablet Take 1 tablet (25 mg total) by mouth 2  "(two) times daily as needed (Systolic blood pressure > 170 mm Hg).      lancets Misc To check BG 3 times daily, to use with insurance preferred meter 100 each 1    lancets Misc To check BG 1 times daily, to use with insurance preferred meter 100 each 11    minoxidiL (LONITEN) 10 MG Tab Take 10 mg by mouth 2 (two) times daily.      multivitamin (THERAGRAN) per tablet Take 1 tablet by mouth.      pen needle, diabetic (BD ULTRA-FINE ROBERT PEN NEEDLE) 32 gauge x 5/32" Ndle 1 pen by Misc.(Non-Drug; Combo Route) route 4 (four) times daily. To use 4 times per day with insulin injections. 100 each 1    semaglutide (OZEMPIC) 0.25 mg or 0.5 mg (2 mg/3 mL) pen injector Inject 0.25 mg into the skin every 7 days. 1.5 mL 2    sucroferric oxyhydroxide (VELPHORO) 500 mg Chew Take 3 tablets by mouth.       No current facility-administered medications for this visit.       Review of Systems   Constitutional:  Negative for chills, fatigue, fever and unexpected weight change.   HENT:  Negative for hearing loss and trouble swallowing.    Eyes:  Negative for photophobia and visual disturbance.   Respiratory:  Negative for cough, shortness of breath and wheezing.    Cardiovascular:  Negative for chest pain, palpitations and leg swelling.   Gastrointestinal:  Negative for abdominal pain and nausea.   Genitourinary:  Negative for dysuria and frequency.   Musculoskeletal:  Positive for gait problem. Negative for arthralgias, back pain, joint swelling and myalgias.   Skin:  Positive for wound. Negative for rash.   Neurological:  Positive for numbness. Negative for tremors, seizures, weakness and headaches.   Hematological:  Does not bruise/bleed easily.       Objective:        Physical Exam:   Foot Exam  Physical Exam  Ortho/SPM Exam     Imaging:            Assessment:       1. Ulcer of right foot with necrosis of muscle    2. Ulcer of left foot with fat layer exposed    3. Type 2 diabetes mellitus with chronic kidney disease on chronic " dialysis, with long-term current use of insulin    4. Type 2 diabetes mellitus with hypoglycemia and coma, with long-term current use of insulin    5. At high risk for inadequate nutritional intake    6. At risk for readmission to hospital    7. Peripheral vascular disease    8. History of foot ulcer    9. History of amputation of left foot    10. 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    11. Bilateral lower extremity edema    12. ESRD (end stage renal disease)    13. Difficulty in walking, not elsewhere classified    14. Decreased pedal pulses    15. Hypertension associated with diabetes    16. Diabetic foot infection    17. History of TIA (transient ischemic attack)    18. Diabetic ulcer of left midfoot associated with type 2 diabetes mellitus, with necrosis of muscle    19. Ulcer of left foot with necrosis of muscle    20. Cellulitis and abscess of foot    21. Comprehensive diabetic foot examination, type 2 DM, encounter for    22. Encounter for diabetic foot exam      Plan:   Ulcer of right foot with necrosis of muscle    Ulcer of left foot with fat layer exposed    Type 2 diabetes mellitus with chronic kidney disease on chronic dialysis, with long-term current use of insulin  -     [facility] DIABETES FOOT EXAM    Type 2 diabetes mellitus with hypoglycemia and coma, with long-term current use of insulin  -     [facility] DIABETES FOOT EXAM    At high risk for inadequate nutritional intake    At risk for readmission to hospital    Peripheral vascular disease    History of foot ulcer    History of amputation of left foot    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    Bilateral lower extremity edema    ESRD (end stage renal disease)    Difficulty in walking, not elsewhere classified    Decreased pedal pulses    Hypertension associated with diabetes    Diabetic foot infection    History of TIA (transient ischemic attack)    Diabetic ulcer of left midfoot associated with type 2 diabetes mellitus, with  necrosis of muscle    Ulcer of left foot with necrosis of muscle    Cellulitis and abscess of foot    Comprehensive diabetic foot examination, type 2 DM, encounter for  -      DIABETES FOOT EXAM    Encounter for diabetic foot exam  -      DIABETES FOOT EXAM      Follow up in about 1 week (around 6/20/2023).    Procedures          Counseling:     I provided patient education verbally regarding:   Patient diagnosis, treatment options, as well as alternatives, risks, and benefits.     This note was created using Dragon voice recognition software that occasionally misinterpreted phrases or words.

## 2023-06-20 ENCOUNTER — OFFICE VISIT (OUTPATIENT)
Dept: WOUND CARE | Facility: HOSPITAL | Age: 58
End: 2023-06-20
Attending: PODIATRIST
Payer: MEDICARE

## 2023-06-20 VITALS
HEART RATE: 73 BPM | DIASTOLIC BLOOD PRESSURE: 79 MMHG | SYSTOLIC BLOOD PRESSURE: 173 MMHG | RESPIRATION RATE: 20 BRPM | TEMPERATURE: 99 F

## 2023-06-20 DIAGNOSIS — E11.59 HYPERTENSION ASSOCIATED WITH DIABETES: ICD-10-CM

## 2023-06-20 DIAGNOSIS — Z86.73 HISTORY OF TIA (TRANSIENT ISCHEMIC ATTACK): ICD-10-CM

## 2023-06-20 DIAGNOSIS — L03.119 CELLULITIS AND ABSCESS OF FOOT: ICD-10-CM

## 2023-06-20 DIAGNOSIS — Z91.89 AT RISK FOR READMISSION TO HOSPITAL: ICD-10-CM

## 2023-06-20 DIAGNOSIS — N18.6 ESRD (END STAGE RENAL DISEASE): ICD-10-CM

## 2023-06-20 DIAGNOSIS — Z91.89 AT HIGH RISK FOR INADEQUATE NUTRITIONAL INTAKE: ICD-10-CM

## 2023-06-20 DIAGNOSIS — R60.0 BILATERAL LOWER EXTREMITY EDEMA: ICD-10-CM

## 2023-06-20 DIAGNOSIS — E11.649 TYPE 2 DIABETES MELLITUS WITH HYPOGLYCEMIA UNAWARENESS: ICD-10-CM

## 2023-06-20 DIAGNOSIS — L97.522 ULCER OF LEFT FOOT WITH FAT LAYER EXPOSED: Primary | ICD-10-CM

## 2023-06-20 DIAGNOSIS — R26.2 DIFFICULTY IN WALKING, NOT ELSEWHERE CLASSIFIED: ICD-10-CM

## 2023-06-20 DIAGNOSIS — E11.628 DIABETIC FOOT INFECTION: ICD-10-CM

## 2023-06-20 DIAGNOSIS — Z89.432 HISTORY OF AMPUTATION OF LEFT FOOT: ICD-10-CM

## 2023-06-20 DIAGNOSIS — E11.641 TYPE 2 DIABETES MELLITUS WITH HYPOGLYCEMIA AND COMA, WITH LONG-TERM CURRENT USE OF INSULIN: ICD-10-CM

## 2023-06-20 DIAGNOSIS — Z99.2 TYPE 2 DIABETES MELLITUS WITH CHRONIC KIDNEY DISEASE ON CHRONIC DIALYSIS, WITH LONG-TERM CURRENT USE OF INSULIN: ICD-10-CM

## 2023-06-20 DIAGNOSIS — N18.6 TYPE 2 DIABETES MELLITUS WITH CHRONIC KIDNEY DISEASE ON CHRONIC DIALYSIS, WITH LONG-TERM CURRENT USE OF INSULIN: ICD-10-CM

## 2023-06-20 DIAGNOSIS — I73.9 PERIPHERAL VASCULAR DISEASE: ICD-10-CM

## 2023-06-20 DIAGNOSIS — R09.89 DECREASED PEDAL PULSES: ICD-10-CM

## 2023-06-20 DIAGNOSIS — L02.619 CELLULITIS AND ABSCESS OF FOOT: ICD-10-CM

## 2023-06-20 DIAGNOSIS — I15.2 HYPERTENSION ASSOCIATED WITH DIABETES: ICD-10-CM

## 2023-06-20 DIAGNOSIS — L08.9 DIABETIC FOOT INFECTION: ICD-10-CM

## 2023-06-20 DIAGNOSIS — L97.513 ULCER OF RIGHT FOOT WITH NECROSIS OF MUSCLE: ICD-10-CM

## 2023-06-20 DIAGNOSIS — Z87.2 HISTORY OF FOOT ULCER: ICD-10-CM

## 2023-06-20 DIAGNOSIS — Z79.4 TYPE 2 DIABETES MELLITUS WITH CHRONIC KIDNEY DISEASE ON CHRONIC DIALYSIS, WITH LONG-TERM CURRENT USE OF INSULIN: ICD-10-CM

## 2023-06-20 DIAGNOSIS — Z79.4 TYPE 2 DIABETES MELLITUS WITH HYPOGLYCEMIA AND COMA, WITH LONG-TERM CURRENT USE OF INSULIN: ICD-10-CM

## 2023-06-20 DIAGNOSIS — E11.22 TYPE 2 DIABETES MELLITUS WITH CHRONIC KIDNEY DISEASE ON CHRONIC DIALYSIS, WITH LONG-TERM CURRENT USE OF INSULIN: ICD-10-CM

## 2023-06-20 PROCEDURE — 3078F DIAST BP <80 MM HG: CPT | Mod: CPTII,,, | Performed by: PODIATRIST

## 2023-06-20 PROCEDURE — 1160F PR REVIEW ALL MEDS BY PRESCRIBER/CLIN PHARMACIST DOCUMENTED: ICD-10-PCS | Mod: CPTII,,, | Performed by: PODIATRIST

## 2023-06-20 PROCEDURE — 3078F PR MOST RECENT DIASTOLIC BLOOD PRESSURE < 80 MM HG: ICD-10-PCS | Mod: CPTII,,, | Performed by: PODIATRIST

## 2023-06-20 PROCEDURE — 4010F ACE/ARB THERAPY RXD/TAKEN: CPT | Mod: CPTII,,, | Performed by: PODIATRIST

## 2023-06-20 PROCEDURE — 3052F HG A1C>EQUAL 8.0%<EQUAL 9.0%: CPT | Mod: CPTII,,, | Performed by: PODIATRIST

## 2023-06-20 PROCEDURE — 99214 PR OFFICE/OUTPT VISIT, EST, LEVL IV, 30-39 MIN: ICD-10-PCS | Mod: 25,,, | Performed by: PODIATRIST

## 2023-06-20 PROCEDURE — 11042 DBRDMT SUBQ TIS 1ST 20SQCM/<: CPT | Performed by: PODIATRIST

## 2023-06-20 PROCEDURE — 4010F PR ACE/ARB THEARPY RXD/TAKEN: ICD-10-PCS | Mod: CPTII,,, | Performed by: PODIATRIST

## 2023-06-20 PROCEDURE — 3077F PR MOST RECENT SYSTOLIC BLOOD PRESSURE >= 140 MM HG: ICD-10-PCS | Mod: CPTII,,, | Performed by: PODIATRIST

## 2023-06-20 PROCEDURE — 11042 DBRDMT SUBQ TIS 1ST 20SQCM/<: CPT | Mod: ,,, | Performed by: PODIATRIST

## 2023-06-20 PROCEDURE — 1160F RVW MEDS BY RX/DR IN RCRD: CPT | Mod: CPTII,,, | Performed by: PODIATRIST

## 2023-06-20 PROCEDURE — 1159F MED LIST DOCD IN RCRD: CPT | Mod: CPTII,,, | Performed by: PODIATRIST

## 2023-06-20 PROCEDURE — 1159F PR MEDICATION LIST DOCUMENTED IN MEDICAL RECORD: ICD-10-PCS | Mod: CPTII,,, | Performed by: PODIATRIST

## 2023-06-20 PROCEDURE — 99214 OFFICE O/P EST MOD 30 MIN: CPT | Mod: 25,,, | Performed by: PODIATRIST

## 2023-06-20 PROCEDURE — 11042 PR DEBRIDEMENT, SKIN, SUB-Q TISSUE,=<20 SQ CM: ICD-10-PCS | Mod: ,,, | Performed by: PODIATRIST

## 2023-06-20 PROCEDURE — 3052F PR MOST RECENT HEMOGLOBIN A1C LEVEL 8.0 - < 9.0%: ICD-10-PCS | Mod: CPTII,,, | Performed by: PODIATRIST

## 2023-06-20 PROCEDURE — 3077F SYST BP >= 140 MM HG: CPT | Mod: CPTII,,, | Performed by: PODIATRIST

## 2023-06-20 NOTE — PROGRESS NOTES
[address] [facility] [address]  [place] [address]  Phone: [phone]   [phone]    Patient's PCP:Stefano Lopez MD  Referring Provider: No ref. provider found    Subjective:      Chief Complaint:: Wound Care, Diabetic Foot Ulcer (right dorsal foot diabetic foot ulcer and left plantar foot diabetic foot), Wound Infection, Wound Check, Numbness, Pressure Ulcer, Non-healing Wound, Foot Ulcer, Diabetes, and Difficulty Walking    HPI  Leanna García is a 57 y.o. female who presents today for follow up of right dorsal foot diabetic foot ulcer and left plantar foot diabetic foot.  See wound docs documentation for full assessment and evaluation of all wounds.        Reviewed all notes from patients medical chart from last several months, including imaging, procedures, studies, progress notes,  cultures, antibiotic regimens, photos,  etc.      1. Debridement of right dorsal foot diabetic foot ulcer.  Evaluation and Assessment only of left plantar foot diabetic foot ulcer.   See Wound Docs for assessment of wounds and procedure notes  2. Continue taking all medications as prescribed  3. Dressing changes, see Wound docs for dressing change orders  4. RTC one week   5. Counseled patient on increasing protein intake, not getting wound wet, keeping dressing clean dry and intact, following a healthy diet, elevating legs when able, removing pressure from wound     Total time spent for E&M 40  Total time for debridement 35 minutes      Proper ulcer care and the possible need for serial debridements, topical medications, specific dressings and biological engineered skin substitutes if indicated.        Counseling/Education:  I provided patient education verbally regarding:   The aspects of diabetes and how it pertains to the feet. I explained the importance of proper diabetic foot care and how it is essential for the health of their feet.     I discussed the importance of knowing their Hemoglobin A1c and that the  level needs to be as close to 6 as possible. I discussed the increase complications of high blood sugar including stroke, blindness, heart attack, kidney failure and loss of limb secondary to neuropathy and PVD.      With neuropathy, beware of any breaks in the skin or redness. These areas are not recognized early due to the numbness.     I discussed Diabetes, lower back issues, metabolic disorders, systemic causes, chemotherapy, vitamin deficiency, heavy metal exposure, as some of the causes. I also explained that as much as 40% of the time we can not find a cause. I discussed different treatments available to control the symptoms but which may not cure the problem.         Counseled patient on the aspects of diabetes and how it pertains to the feet.  I explained the importance of proper diabetic foot care and how it is essential for the health of their feet.        Shoe inspection. Patient instructed on proper foot hygeine. We discussed wearing proper shoe gear, daily foot inspections, never walking without protective shoe gear, never putting sharp instruments to feet, routine podiatric nail visits every 2-3 months.          Patient should call the office immediately if any signs of infection, such as fever, chills, sweats, increased redness or pain.     Patient was instructed to call the clinic or go to the emergency department if their symptoms do not improve, worsens, or if new symptoms develop.  Patient was advised that if any increased swelling, pain, or numbness arise to go immediately to the ED. Patient knows to call any time if an emergency arises. Shared decision making occurred and patient verbalized understanding in agreement with this plan.         >50% of this > 75 minute visit was spent face to face educating/counseling the patient     I spent a total of 75 minutes on the day of the visit.This includes face to face time and non-face to face time preparing to see the patient (eg, review of tests),  obtaining and/or reviewing separately obtained history, documenting clinical information in the electronic or other health record, independently interpreting results and communicating results to the patient/family/caregiver, or care coordinator.        Much of the documentation for this visit was completed in the Wound Docs system.  Please see the attached documentation for further details about the patient's care. Scanned under the Media tab.         Alivia Bruno DPM     Vitals:    06/20/23 1104   BP: (!) 173/79   Pulse: 73   Resp: 20   Temp: 98.5 °F (36.9 °C)   PainSc: 0-No pain      Shoe Size:     Past Surgical History:   Procedure Laterality Date    ANGIOGRAPHY OF LOWER EXTREMITY Left 10/18/2022    Procedure: Angiogram Extremity Unilateral;  Surgeon: Ali Khoobehi, MD;  Location: [facility] CATH/EP LAB;  Service: Vascular;  Laterality: Left;    AV FISTULA PLACEMENT Left 8/29/2022    Procedure: CREATION, AV FISTULA;  Surgeon: Ali Khoobehi, MD;  Location: [facility] OR;  Service: Vascular;  Laterality: Left;    BONE BIOPSY Left 8/24/2022    Procedure: Biopsy-Bone;  Surgeon: Porfirio Ponce DPM;  Location: [facility] OR;  Service: Podiatry;  Laterality: Left;    COLONOSCOPY N/A 10/5/2016    Procedure: COLONOSCOPY;  Surgeon: Pillo Chanel MD;  Location: [facility] ENDO;  Service: Endoscopy;  Laterality: N/A;    COLONOSCOPY N/A 4/26/2022    Procedure: COLONOSCOPY;  Surgeon: Saravanan Rachel MD;  Location: [facility] ENDO;  Service: Endoscopy;  Laterality: N/A;    FISTULOGRAM Left 8/24/2022    Procedure: Fistulogram;  Surgeon: Ali Khoobehi, MD;  Location: [facility] CATH/EP LAB;  Service: Vascular;  Laterality: Left;    HERNIA REPAIR Bilateral 11/22/2016    inguinal    HYSTERECTOMY      INCISION AND DRAINAGE FOOT Left 5/13/2022    Procedure: INCISION AND DRAINAGE, FOOT;  Surgeon: Ricardo Bruno DPM;  Location: [facility] OR;  Service: Podiatry;  Laterality: Left;    OOPHORECTOMY      THROMBECTOMY, AV FISTULA, UPPER EXTREMITY, PERCUTANEOUS  8/24/2022     Procedure: THROMBECTOMY, AV FISTULA, UPPER EXTREMITY, PERCUTANEOUS;  Surgeon: Ali Khoobehi, MD;  Location: [facility] CATH/EP LAB;  Service: Vascular;;    TOE AMPUTATION Left 8/26/2022    Procedure: AMPUTATION, TOE;  Surgeon: Porfirio Ponce DPM;  Location: [facility] OR;  Service: Podiatry;  Laterality: Left;     Past Medical History:   Diagnosis Date    A-fib     resolved per patient    Anemia due to end stage renal disease 6/30/2022    Arthritis     Bronchitis     Cardiovascular event risk, ASCVD 10-year risk 6.7% 10/15/2016    Diabetes mellitus type II     Diabetic foot infection     Diabetic ulcer of left midfoot 05/05/2022    Disorder of kidney and ureter     Encounter for blood transfusion     ESRD (end stage renal disease) 05/18/2018    MANJINDER (generalized anxiety disorder) 10/25/2016    History of colon polyps 11/02/2016    Hyperlipidemia     Hypertension     Stroke      Family History   Problem Relation Age of Onset    Hyperlipidemia Mother     Hypertension Mother     Hypertension Father     Diabetes Father     Diabetes Sister     Diabetes Sister     Diabetes Maternal Aunt     Diabetes Maternal Grandmother     Heart disease Maternal Grandmother     Cancer Paternal Grandmother     Breast cancer Neg Hx     Colon cancer Neg Hx     Ovarian cancer Neg Hx         Social History:   Marital Status:   Alcohol History:  reports no history of alcohol use.  Tobacco History:  reports that she has never smoked. She has never used smokeless tobacco.  Drug History:  reports no history of drug use.    Review of patient's allergies indicates:   Allergen Reactions    Dilaudid [hydromorphone] Nausea And Vomiting    Chlorhexidine Itching    Lortab [hydrocodone-acetaminophen] Itching       Current Outpatient Medications   Medication Sig Dispense Refill    acetaminophen (TYLENOL) 325 MG tablet Take 325 mg by mouth every 6 (six) hours.      ALPRAZolam (XANAX) 0.5 MG tablet Take 1 tablet by mouth.      atorvastatin (LIPITOR) 80 MG  tablet Take 1 tablet (80 mg total) by mouth once daily. 90 tablet 3    azelastine (ASTELIN) 137 mcg (0.1 %) nasal spray 1 spray (137 mcg total) by Nasal route 2 (two) times daily. 30 mL 3    blood sugar diagnostic Strp To check BG 3 times daily, to use with insurance preferred meter 100 strip 1    blood sugar diagnostic Strp To check BG 4 times daily, to use with insurance preferred meter 450 strip 3    blood sugar diagnostic Strp To check BG 1 times daily, to use with insurance preferred meter 100 each 11    blood-glucose meter kit To check BG 3 times daily, to use with insurance preferred meter 1 each 0    blood-glucose meter kit To check BG 3 times daily, to use with insurance preferred meter 1 each 0    cetirizine (ZYRTEC) 10 MG tablet Take 1 tablet (10 mg total) by mouth every other day. 45 tablet 3    clopidogreL (PLAVIX) 75 mg tablet Take 1 tablet (75 mg total) by mouth once daily. 90 tablet 3    doxycycline (MONODOX) 100 MG capsule Take 1 capsule (100 mg total) by mouth 2 (two) times daily. 20 capsule 0    epoetin chris-epbx (RETACRIT) 4,000 unit/mL injection Inject 1.11 mLs (4,440 Units total) into the skin every Mon, Wed, Fri.      EScitalopram oxalate (LEXAPRO) 10 MG tablet Take 1 tablet (10 mg total) by mouth once daily. 30 tablet 11    fluticasone propionate (FLONASE) 50 mcg/actuation nasal spray 2 sprays (100 mcg total) by Each Nostril route once daily. 16 g 3    gabapentin (NEURONTIN) 100 MG capsule Take 1 capsule (100 mg total) by mouth 2 (two) times daily. 180 capsule 3    hydrALAZINE (APRESOLINE) 25 MG tablet Take 1 tablet (25 mg total) by mouth 2 (two) times daily as needed (Systolic blood pressure > 170 mm Hg).      lancets Misc To check BG 3 times daily, to use with insurance preferred meter 100 each 1    lancets Misc To check BG 1 times daily, to use with insurance preferred meter 100 each 11    minoxidiL (LONITEN) 10 MG Tab Take 10 mg by mouth 2 (two) times daily.      multivitamin (THERAGRAN)  "per tablet Take 1 tablet by mouth.      pen needle, diabetic (BD ULTRA-FINE ROBERT PEN NEEDLE) 32 gauge x 5/32" Ndle 1 pen by Misc.(Non-Drug; Combo Route) route 4 (four) times daily. To use 4 times per day with insulin injections. 100 each 1    semaglutide (OZEMPIC) 0.25 mg or 0.5 mg (2 mg/3 mL) pen injector Inject 0.25 mg into the skin every 7 days. 1.5 mL 2    sucroferric oxyhydroxide (VELPHORO) 500 mg Chew Take 3 tablets by mouth.       No current facility-administered medications for this visit.       Review of Systems   Constitutional:  Negative for chills, fatigue, fever and unexpected weight change.   HENT:  Negative for hearing loss and trouble swallowing.    Eyes:  Negative for photophobia and visual disturbance.   Respiratory:  Negative for cough, shortness of breath and wheezing.    Cardiovascular:  Negative for chest pain, palpitations and leg swelling.   Gastrointestinal:  Negative for abdominal pain and nausea.   Genitourinary:  Negative for dysuria and frequency.   Musculoskeletal:  Positive for gait problem. Negative for arthralgias, back pain, joint swelling and myalgias.   Skin:  Positive for wound. Negative for rash.   Neurological:  Positive for numbness. Negative for tremors, seizures, weakness and headaches.   Hematological:  Does not bruise/bleed easily.       Objective:        Physical Exam:   Foot Exam  Physical Exam  Ortho/SPM Exam     Imaging:            Assessment:       1. Ulcer of left foot with fat layer exposed    2. Type 2 diabetes mellitus with chronic kidney disease on chronic dialysis, with long-term current use of insulin    3. Type 2 diabetes mellitus with hypoglycemia and coma, with long-term current use of insulin    4. Peripheral vascular disease    5. At risk for readmission to hospital    6. History of amputation of left foot    7. 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 Difficulty in walking, not elsewhere classified    9. ESRD (end stage renal disease)  "   10. Bilateral lower extremity edema    11. History of foot ulcer    12. At high risk for inadequate nutritional intake    13. Decreased pedal pulses    14. Hypertension associated with diabetes    15. Ulcer of right foot with necrosis of muscle    16. Cellulitis and abscess of foot    17. History of TIA (transient ischemic attack)    18. 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    Procedures          Counseling:     I provided patient education verbally regarding:   Patient diagnosis, treatment options, as well as alternatives, risks, and benefits.     This note was created using Dragon voice recognition software that occasionally misinterpreted phrases or words.

## 2023-06-21 ENCOUNTER — DOCUMENT SCAN (OUTPATIENT)
Dept: HOME HEALTH SERVICES | Facility: HOSPITAL | Age: 58
End: 2023-06-21
Payer: MEDICARE

## 2023-06-21 ENCOUNTER — PATIENT MESSAGE (OUTPATIENT)
Dept: ADMINISTRATIVE | Facility: HOSPITAL | Age: 58
End: 2023-06-21
Payer: MEDICARE

## 2023-06-27 ENCOUNTER — OFFICE VISIT (OUTPATIENT)
Dept: WOUND CARE | Facility: HOSPITAL | Age: 58
End: 2023-06-27
Attending: PODIATRIST
Payer: MEDICARE

## 2023-06-27 VITALS
DIASTOLIC BLOOD PRESSURE: 86 MMHG | HEART RATE: 69 BPM | RESPIRATION RATE: 16 BRPM | TEMPERATURE: 98 F | SYSTOLIC BLOOD PRESSURE: 199 MMHG

## 2023-06-27 DIAGNOSIS — I73.9 PERIPHERAL VASCULAR DISEASE: ICD-10-CM

## 2023-06-27 DIAGNOSIS — R26.2 DIFFICULTY IN WALKING, NOT ELSEWHERE CLASSIFIED: ICD-10-CM

## 2023-06-27 DIAGNOSIS — E11.641 TYPE 2 DIABETES MELLITUS WITH HYPOGLYCEMIA AND COMA, WITH LONG-TERM CURRENT USE OF INSULIN: ICD-10-CM

## 2023-06-27 DIAGNOSIS — N18.6 ESRD (END STAGE RENAL DISEASE): ICD-10-CM

## 2023-06-27 DIAGNOSIS — Z99.2 TYPE 2 DIABETES MELLITUS WITH CHRONIC KIDNEY DISEASE ON CHRONIC DIALYSIS, WITH LONG-TERM CURRENT USE OF INSULIN: ICD-10-CM

## 2023-06-27 DIAGNOSIS — R60.0 BILATERAL LOWER EXTREMITY EDEMA: ICD-10-CM

## 2023-06-27 DIAGNOSIS — L03.119 CELLULITIS AND ABSCESS OF FOOT: ICD-10-CM

## 2023-06-27 DIAGNOSIS — E11.621 DIABETIC ULCER OF LEFT MIDFOOT ASSOCIATED WITH TYPE 2 DIABETES MELLITUS, WITH NECROSIS OF MUSCLE: ICD-10-CM

## 2023-06-27 DIAGNOSIS — R09.89 DECREASED PEDAL PULSES: ICD-10-CM

## 2023-06-27 DIAGNOSIS — Z87.2 HISTORY OF FOOT ULCER: ICD-10-CM

## 2023-06-27 DIAGNOSIS — L97.522 ULCER OF LEFT FOOT WITH FAT LAYER EXPOSED: Primary | ICD-10-CM

## 2023-06-27 DIAGNOSIS — Z91.89 AT RISK FOR READMISSION TO HOSPITAL: ICD-10-CM

## 2023-06-27 DIAGNOSIS — L97.423 DIABETIC ULCER OF LEFT MIDFOOT ASSOCIATED WITH TYPE 2 DIABETES MELLITUS, WITH NECROSIS OF MUSCLE: ICD-10-CM

## 2023-06-27 DIAGNOSIS — E11.649 TYPE 2 DIABETES MELLITUS WITH HYPOGLYCEMIA UNAWARENESS: ICD-10-CM

## 2023-06-27 DIAGNOSIS — L97.513 ULCER OF RIGHT FOOT WITH NECROSIS OF MUSCLE: ICD-10-CM

## 2023-06-27 DIAGNOSIS — L08.9 DIABETIC FOOT INFECTION: ICD-10-CM

## 2023-06-27 DIAGNOSIS — L02.619 CELLULITIS AND ABSCESS OF FOOT: ICD-10-CM

## 2023-06-27 DIAGNOSIS — E11.59 HYPERTENSION ASSOCIATED WITH DIABETES: ICD-10-CM

## 2023-06-27 DIAGNOSIS — N18.6 TYPE 2 DIABETES MELLITUS WITH CHRONIC KIDNEY DISEASE ON CHRONIC DIALYSIS, WITH LONG-TERM CURRENT USE OF INSULIN: ICD-10-CM

## 2023-06-27 DIAGNOSIS — E11.628 DIABETIC FOOT INFECTION: ICD-10-CM

## 2023-06-27 DIAGNOSIS — Z89.432 HISTORY OF AMPUTATION OF LEFT FOOT: ICD-10-CM

## 2023-06-27 DIAGNOSIS — Z79.4 TYPE 2 DIABETES MELLITUS WITH HYPOGLYCEMIA AND COMA, WITH LONG-TERM CURRENT USE OF INSULIN: ICD-10-CM

## 2023-06-27 DIAGNOSIS — E11.22 TYPE 2 DIABETES MELLITUS WITH CHRONIC KIDNEY DISEASE ON CHRONIC DIALYSIS, WITH LONG-TERM CURRENT USE OF INSULIN: ICD-10-CM

## 2023-06-27 DIAGNOSIS — Z79.4 TYPE 2 DIABETES MELLITUS WITH CHRONIC KIDNEY DISEASE ON CHRONIC DIALYSIS, WITH LONG-TERM CURRENT USE OF INSULIN: ICD-10-CM

## 2023-06-27 DIAGNOSIS — Z91.89 AT HIGH RISK FOR INADEQUATE NUTRITIONAL INTAKE: ICD-10-CM

## 2023-06-27 DIAGNOSIS — Z86.73 HISTORY OF TIA (TRANSIENT ISCHEMIC ATTACK): ICD-10-CM

## 2023-06-27 DIAGNOSIS — I15.2 HYPERTENSION ASSOCIATED WITH DIABETES: ICD-10-CM

## 2023-06-27 PROCEDURE — 11042 PR DEBRIDEMENT, SKIN, SUB-Q TISSUE,=<20 SQ CM: ICD-10-PCS | Mod: ,,, | Performed by: PODIATRIST

## 2023-06-27 PROCEDURE — 11042 DBRDMT SUBQ TIS 1ST 20SQCM/<: CPT | Mod: ,,, | Performed by: PODIATRIST

## 2023-06-27 PROCEDURE — 3052F HG A1C>EQUAL 8.0%<EQUAL 9.0%: CPT | Mod: CPTII,,, | Performed by: PODIATRIST

## 2023-06-27 PROCEDURE — 1159F MED LIST DOCD IN RCRD: CPT | Mod: CPTII,,, | Performed by: PODIATRIST

## 2023-06-27 PROCEDURE — 3077F SYST BP >= 140 MM HG: CPT | Mod: CPTII,,, | Performed by: PODIATRIST

## 2023-06-27 PROCEDURE — 3077F PR MOST RECENT SYSTOLIC BLOOD PRESSURE >= 140 MM HG: ICD-10-PCS | Mod: CPTII,,, | Performed by: PODIATRIST

## 2023-06-27 PROCEDURE — 4010F PR ACE/ARB THEARPY RXD/TAKEN: ICD-10-PCS | Mod: CPTII,,, | Performed by: PODIATRIST

## 2023-06-27 PROCEDURE — 4010F ACE/ARB THERAPY RXD/TAKEN: CPT | Mod: CPTII,,, | Performed by: PODIATRIST

## 2023-06-27 PROCEDURE — 1160F PR REVIEW ALL MEDS BY PRESCRIBER/CLIN PHARMACIST DOCUMENTED: ICD-10-PCS | Mod: CPTII,,, | Performed by: PODIATRIST

## 2023-06-27 PROCEDURE — 3079F PR MOST RECENT DIASTOLIC BLOOD PRESSURE 80-89 MM HG: ICD-10-PCS | Mod: CPTII,,, | Performed by: PODIATRIST

## 2023-06-27 PROCEDURE — 1160F RVW MEDS BY RX/DR IN RCRD: CPT | Mod: CPTII,,, | Performed by: PODIATRIST

## 2023-06-27 PROCEDURE — 11042 DBRDMT SUBQ TIS 1ST 20SQCM/<: CPT | Performed by: PODIATRIST

## 2023-06-27 PROCEDURE — 3079F DIAST BP 80-89 MM HG: CPT | Mod: CPTII,,, | Performed by: PODIATRIST

## 2023-06-27 PROCEDURE — 1159F PR MEDICATION LIST DOCUMENTED IN MEDICAL RECORD: ICD-10-PCS | Mod: CPTII,,, | Performed by: PODIATRIST

## 2023-06-27 PROCEDURE — 99214 OFFICE O/P EST MOD 30 MIN: CPT | Mod: 25,,, | Performed by: PODIATRIST

## 2023-06-27 PROCEDURE — 99214 PR OFFICE/OUTPT VISIT, EST, LEVL IV, 30-39 MIN: ICD-10-PCS | Mod: 25,,, | Performed by: PODIATRIST

## 2023-06-27 PROCEDURE — 3052F PR MOST RECENT HEMOGLOBIN A1C LEVEL 8.0 - < 9.0%: ICD-10-PCS | Mod: CPTII,,, | Performed by: PODIATRIST

## 2023-06-28 NOTE — PROGRESS NOTES
[address]  [place] [address]  Phone: [phone]   [phone]    Patient's PCP:Stefano Lopez MD  Referring Provider: No ref. provider found    Subjective:      Chief Complaint:: Wound Care, Wound Consult, Diabetic Foot Ulcer (right dorsal foot diabetic foot ulcer and left plantar foot diabetic foot), Wound Check, Pressure Ulcer, Non-healing Wound, Numbness, Foot Ulcer, Difficulty Walking, and Diabetes    HPI  Leanna García is a 57 y.o. female who presents today for follow up of right dorsal foot diabetic foot ulcer and left plantar foot diabetic foot.  See wound docs documentation for full assessment and evaluation of all wounds.         Reviewed all notes from patients medical chart from last several months, including imaging, procedures, studies, progress notes,  cultures, antibiotic regimens, photos,  etc.     1. Debridement of left plantar foot diabetic foot ulcer.  Evaluation and Assessment only of right dorsal foot diabetic foot ulcer (now healed). See Wound Docs for assessment of wounds and procedure notes  2. Continue taking all medications as prescribed  3. Dressing changes, see Wound docs for dressing change orders  4. RTC one week   5. Counseled patient on increasing protein intake, not getting wound wet, keeping dressing clean dry and intact, following a healthy diet, elevating legs when able, removing pressure from wound     Total time spent for E&M 40  Total time for debridement 35 minutes      Proper ulcer care and the possible need for serial debridements, topical medications, specific dressings and biological engineered skin substitutes if indicated.        Counseling/Education:  I provided patient education verbally regarding:   The aspects of diabetes and how it pertains to the feet. I explained the importance of proper diabetic foot care and how it is essential for the health of their feet.     I discussed the importance of knowing their Hemoglobin A1c and that  the level needs to be as close to 6 as possible. I discussed the increase complications of high blood sugar including stroke, blindness, heart attack, kidney failure and loss of limb secondary to neuropathy and PVD.      With neuropathy, beware of any breaks in the skin or redness. These areas are not recognized early due to the numbness.     I discussed Diabetes, lower back issues, metabolic disorders, systemic causes, chemotherapy, vitamin deficiency, heavy metal exposure, as some of the causes. I also explained that as much as 40% of the time we can not find a cause. I discussed different treatments available to control the symptoms but which may not cure the problem.         Counseled patient on the aspects of diabetes and how it pertains to the feet.  I explained the importance of proper diabetic foot care and how it is essential for the health of their feet.        Shoe inspection. Patient instructed on proper foot hygeine. We discussed wearing proper shoe gear, daily foot inspections, never walking without protective shoe gear, never putting sharp instruments to feet, routine podiatric nail visits every 2-3 months.          Patient should call the office immediately if any signs of infection, such as fever, chills, sweats, increased redness or pain.     Patient was instructed to call the clinic or go to the emergency department if their symptoms do not improve, worsens, or if new symptoms develop.  Patient was advised that if any increased swelling, pain, or numbness arise to go immediately to the ED. Patient knows to call any time if an emergency arises. Shared decision making occurred and patient verbalized understanding in agreement with this plan.         >50% of this > 75 minute visit was spent face to face educating/counseling the patient     I spent a total of 75 minutes on the day of the visit.This includes face to face time and non-face to face time preparing to see the patient (eg, review of tests),  obtaining and/or reviewing separately obtained history, documenting clinical information in the electronic or other health record, independently interpreting results and communicating results to the patient/family/caregiver, or care coordinator.        Much of the documentation for this visit was completed in the Wound Docs system.  Please see the attached documentation for further details about the patient's care. Scanned under the Media tab.         Alivia Bruno DPM     Vitals:    06/27/23 1000   BP: (!) 199/86   Pulse: 69   Resp: 16   Temp: 98.4 °F (36.9 °C)   PainSc: 0-No pain      Shoe Size:     Past Surgical History:   Procedure Laterality Date    ANGIOGRAPHY OF LOWER EXTREMITY Left 10/18/2022    Procedure: Angiogram Extremity Unilateral;  Surgeon: Ali Khoobehi, MD;  Location: [facility] CATH/EP LAB;  Service: Vascular;  Laterality: Left;    AV FISTULA PLACEMENT Left 8/29/2022    Procedure: CREATION, AV FISTULA;  Surgeon: Ali Khoobehi, MD;  Location: [facility] OR;  Service: Vascular;  Laterality: Left;    BONE BIOPSY Left 8/24/2022    Procedure: Biopsy-Bone;  Surgeon: Porfirio Ponce DPM;  Location: [facility] OR;  Service: Podiatry;  Laterality: Left;    COLONOSCOPY N/A 10/5/2016    Procedure: COLONOSCOPY;  Surgeon: Pillo Chanel MD;  Location: [facility] ENDO;  Service: Endoscopy;  Laterality: N/A;    COLONOSCOPY N/A 4/26/2022    Procedure: COLONOSCOPY;  Surgeon: Saravanan Rachel MD;  Location: [facility] ENDO;  Service: Endoscopy;  Laterality: N/A;    FISTULOGRAM Left 8/24/2022    Procedure: Fistulogram;  Surgeon: Ali Khoobehi, MD;  Location: [facility] CATH/EP LAB;  Service: Vascular;  Laterality: Left;    HERNIA REPAIR Bilateral 11/22/2016    inguinal    HYSTERECTOMY      INCISION AND DRAINAGE FOOT Left 5/13/2022    Procedure: INCISION AND DRAINAGE, FOOT;  Surgeon: Ricardo Bruno DPM;  Location: [facility] OR;  Service: Podiatry;  Laterality: Left;    OOPHORECTOMY      THROMBECTOMY, AV FISTULA, UPPER EXTREMITY, PERCUTANEOUS  8/24/2022     Procedure: THROMBECTOMY, AV FISTULA, UPPER EXTREMITY, PERCUTANEOUS;  Surgeon: Ali Khoobehi, MD;  Location: [facility] CATH/EP LAB;  Service: Vascular;;    TOE AMPUTATION Left 8/26/2022    Procedure: AMPUTATION, TOE;  Surgeon: Porfirio Ponce DPM;  Location: [facility] OR;  Service: Podiatry;  Laterality: Left;     Past Medical History:   Diagnosis Date    A-fib     resolved per patient    Anemia due to end stage renal disease 6/30/2022    Arthritis     Bronchitis     Cardiovascular event risk, ASCVD 10-year risk 6.7% 10/15/2016    Diabetes mellitus type II     Diabetic foot infection     Diabetic ulcer of left midfoot 05/05/2022    Disorder of kidney and ureter     Encounter for blood transfusion     ESRD (end stage renal disease) 05/18/2018    MANJINDER (generalized anxiety disorder) 10/25/2016    History of colon polyps 11/02/2016    Hyperlipidemia     Hypertension     Stroke      Family History   Problem Relation Age of Onset    Hyperlipidemia Mother     Hypertension Mother     Hypertension Father     Diabetes Father     Diabetes Sister     Diabetes Sister     Diabetes Maternal Aunt     Diabetes Maternal Grandmother     Heart disease Maternal Grandmother     Cancer Paternal Grandmother     Breast cancer Neg Hx     Colon cancer Neg Hx     Ovarian cancer Neg Hx         Social History:   Marital Status:   Alcohol History:  reports no history of alcohol use.  Tobacco History:  reports that she has never smoked. She has never used smokeless tobacco.  Drug History:  reports no history of drug use.    Review of patient's allergies indicates:   Allergen Reactions    Dilaudid [hydromorphone] Nausea And Vomiting    Chlorhexidine Itching    Lortab [hydrocodone-acetaminophen] Itching       Current Outpatient Medications   Medication Sig Dispense Refill    acetaminophen (TYLENOL) 325 MG tablet Take 325 mg by mouth every 6 (six) hours.      ALPRAZolam (XANAX) 0.5 MG tablet Take 1 tablet by mouth.      atorvastatin (LIPITOR) 80 MG  tablet Take 1 tablet (80 mg total) by mouth once daily. 90 tablet 3    azelastine (ASTELIN) 137 mcg (0.1 %) nasal spray 1 spray (137 mcg total) by Nasal route 2 (two) times daily. 30 mL 3    blood sugar diagnostic Strp To check BG 3 times daily, to use with insurance preferred meter 100 strip 1    blood sugar diagnostic Strp To check BG 4 times daily, to use with insurance preferred meter 450 strip 3    blood sugar diagnostic Strp To check BG 1 times daily, to use with insurance preferred meter 100 each 11    blood-glucose meter kit To check BG 3 times daily, to use with insurance preferred meter 1 each 0    blood-glucose meter kit To check BG 3 times daily, to use with insurance preferred meter 1 each 0    cetirizine (ZYRTEC) 10 MG tablet Take 1 tablet (10 mg total) by mouth every other day. 45 tablet 3    clopidogreL (PLAVIX) 75 mg tablet Take 1 tablet (75 mg total) by mouth once daily. 90 tablet 3    doxycycline (MONODOX) 100 MG capsule Take 1 capsule (100 mg total) by mouth 2 (two) times daily. 20 capsule 0    epoetin chris-epbx (RETACRIT) 4,000 unit/mL injection Inject 1.11 mLs (4,440 Units total) into the skin every Mon, Wed, Fri.      EScitalopram oxalate (LEXAPRO) 10 MG tablet Take 1 tablet (10 mg total) by mouth once daily. 30 tablet 11    fluticasone propionate (FLONASE) 50 mcg/actuation nasal spray 2 sprays (100 mcg total) by Each Nostril route once daily. 16 g 3    gabapentin (NEURONTIN) 100 MG capsule Take 1 capsule (100 mg total) by mouth 2 (two) times daily. 180 capsule 3    hydrALAZINE (APRESOLINE) 25 MG tablet Take 1 tablet (25 mg total) by mouth 2 (two) times daily as needed (Systolic blood pressure > 170 mm Hg).      lancets Misc To check BG 3 times daily, to use with insurance preferred meter 100 each 1    lancets Misc To check BG 1 times daily, to use with insurance preferred meter 100 each 11    minoxidiL (LONITEN) 10 MG Tab Take 10 mg by mouth 2 (two) times daily.      multivitamin (THERAGRAN)  "per tablet Take 1 tablet by mouth.      pen needle, diabetic (BD ULTRA-FINE ROBERT PEN NEEDLE) 32 gauge x 5/32" Ndle 1 pen by Misc.(Non-Drug; Combo Route) route 4 (four) times daily. To use 4 times per day with insulin injections. 100 each 1    semaglutide (OZEMPIC) 0.25 mg or 0.5 mg (2 mg/3 mL) pen injector Inject 0.25 mg into the skin every 7 days. 1.5 mL 2    sucroferric oxyhydroxide (VELPHORO) 500 mg Chew Take 3 tablets by mouth.       No current facility-administered medications for this visit.       Review of Systems   Constitutional:  Negative for chills, fatigue, fever and unexpected weight change.   HENT:  Negative for hearing loss and trouble swallowing.    Eyes:  Negative for photophobia and visual disturbance.   Respiratory:  Negative for cough, shortness of breath and wheezing.    Cardiovascular:  Negative for chest pain, palpitations and leg swelling.   Gastrointestinal:  Negative for abdominal pain and nausea.   Genitourinary:  Negative for dysuria and frequency.   Musculoskeletal:  Positive for gait problem. Negative for arthralgias, back pain, joint swelling and myalgias.   Skin:  Positive for wound. Negative for rash.   Neurological:  Positive for numbness. Negative for tremors, seizures, weakness and headaches.   Hematological:  Does not bruise/bleed easily.       Objective:        Physical Exam:   Foot Exam  Physical Exam  Ortho/SPM Exam     Imaging:            Assessment:       1. Ulcer of left foot with fat layer exposed    2. Peripheral vascular disease    3. Type 2 diabetes mellitus with chronic kidney disease on chronic dialysis, with long-term current use of insulin    4. History of amputation of left foot    5. 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 Type 2 diabetes mellitus with hypoglycemia and coma, with long-term current use of insulin    7. At high risk for inadequate nutritional intake    8. History of foot ulcer    9. Difficulty in walking, not elsewhere " classified    10. Bilateral lower extremity edema    11. At risk for readmission to hospital    12. ESRD (end stage renal disease)    13. Decreased pedal pulses    14. Hypertension associated with diabetes    15. Ulcer of right foot with necrosis of muscle    16. Cellulitis and abscess of foot    17. History of TIA (transient ischemic attack)    18. Diabetic foot infection    19. 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    Type 2 diabetes mellitus with hypoglycemia and coma, with long-term current use of insulin    At high risk for inadequate nutritional intake    History of foot ulcer    Difficulty in walking, not elsewhere classified    Bilateral lower extremity edema    At risk for readmission to hospital    ESRD (end stage renal disease)    Decreased pedal pulses    Hypertension associated with diabetes    Ulcer of right foot with necrosis of muscle    Cellulitis and abscess of foot    History of TIA (transient ischemic attack)    Diabetic foot infection    Diabetic ulcer of left midfoot associated with type 2 diabetes mellitus, with necrosis of muscle      Follow up if symptoms worsen or fail to improve.    Procedures          Counseling:     I provided patient education verbally regarding:   Patient diagnosis, treatment options, as well as alternatives, risks, and benefits.     This note was created using Dragon voice recognition software that occasionally misinterpreted phrases or words.

## 2023-06-29 ENCOUNTER — DOCUMENT SCAN (OUTPATIENT)
Dept: HOME HEALTH SERVICES | Facility: HOSPITAL | Age: 58
End: 2023-06-29
Payer: MEDICARE

## 2023-06-30 ENCOUNTER — TELEPHONE (OUTPATIENT)
Dept: FAMILY MEDICINE | Facility: CLINIC | Age: 58
End: 2023-06-30
Payer: MEDICARE

## 2023-06-30 NOTE — TELEPHONE ENCOUNTER
Called and spoke to patient, advised of lab results and recommendations. Patient verbalized understanding to all.

## 2023-06-30 NOTE — TELEPHONE ENCOUNTER
----- Message from Sia Brown sent at 6/30/2023 11:24 AM CDT -----  Regarding: Result  Type:  Test Results    Who Called: Pt    Name of Test (Lab/Mammo/Etc): Labs    Date of Test: not sure May or June    [facility]  [address] [facility]     Would the patient rather a call back or a response via [facility]? Call back    Best Call Back Number: [phone]    Additional Information:  Sts she was not given her results and would like someone to call her. Please advise ----------Thank you.

## 2023-07-05 ENCOUNTER — DOCUMENT SCAN (OUTPATIENT)
Dept: HOME HEALTH SERVICES | Facility: HOSPITAL | Age: 58
End: 2023-07-05
Payer: MEDICARE

## 2023-07-18 ENCOUNTER — OFFICE VISIT (OUTPATIENT)
Dept: WOUND CARE | Facility: HOSPITAL | Age: 58
End: 2023-07-18
Attending: PODIATRIST
Payer: MEDICARE

## 2023-07-18 VITALS
DIASTOLIC BLOOD PRESSURE: 87 MMHG | RESPIRATION RATE: 16 BRPM | TEMPERATURE: 99 F | HEART RATE: 91 BPM | SYSTOLIC BLOOD PRESSURE: 187 MMHG

## 2023-07-18 DIAGNOSIS — Z99.2 TYPE 2 DIABETES MELLITUS WITH CHRONIC KIDNEY DISEASE ON CHRONIC DIALYSIS, WITH LONG-TERM CURRENT USE OF INSULIN: ICD-10-CM

## 2023-07-18 DIAGNOSIS — I15.2 HYPERTENSION ASSOCIATED WITH DIABETES: ICD-10-CM

## 2023-07-18 DIAGNOSIS — Z79.4 TYPE 2 DIABETES MELLITUS WITH HYPOGLYCEMIA AND COMA, WITH LONG-TERM CURRENT USE OF INSULIN: ICD-10-CM

## 2023-07-18 DIAGNOSIS — Z86.73 HISTORY OF TIA (TRANSIENT ISCHEMIC ATTACK): ICD-10-CM

## 2023-07-18 DIAGNOSIS — Z91.89 AT HIGH RISK FOR INADEQUATE NUTRITIONAL INTAKE: ICD-10-CM

## 2023-07-18 DIAGNOSIS — E11.641 TYPE 2 DIABETES MELLITUS WITH HYPOGLYCEMIA AND COMA, WITH LONG-TERM CURRENT USE OF INSULIN: ICD-10-CM

## 2023-07-18 DIAGNOSIS — R26.2 DIFFICULTY IN WALKING, NOT ELSEWHERE CLASSIFIED: ICD-10-CM

## 2023-07-18 DIAGNOSIS — E11.649 TYPE 2 DIABETES MELLITUS WITH HYPOGLYCEMIA UNAWARENESS: ICD-10-CM

## 2023-07-18 DIAGNOSIS — Z87.2 HISTORY OF FOOT ULCER: ICD-10-CM

## 2023-07-18 DIAGNOSIS — R60.0 BILATERAL LOWER EXTREMITY EDEMA: ICD-10-CM

## 2023-07-18 DIAGNOSIS — Z91.89 AT RISK FOR READMISSION TO HOSPITAL: ICD-10-CM

## 2023-07-18 DIAGNOSIS — E11.22 TYPE 2 DIABETES MELLITUS WITH CHRONIC KIDNEY DISEASE ON CHRONIC DIALYSIS, WITH LONG-TERM CURRENT USE OF INSULIN: ICD-10-CM

## 2023-07-18 DIAGNOSIS — I73.9 PERIPHERAL VASCULAR DISEASE: ICD-10-CM

## 2023-07-18 DIAGNOSIS — L97.522 ULCER OF LEFT FOOT WITH FAT LAYER EXPOSED: Primary | ICD-10-CM

## 2023-07-18 DIAGNOSIS — N18.6 ESRD (END STAGE RENAL DISEASE): ICD-10-CM

## 2023-07-18 DIAGNOSIS — Z89.432 HISTORY OF AMPUTATION OF LEFT FOOT: ICD-10-CM

## 2023-07-18 DIAGNOSIS — E11.59 HYPERTENSION ASSOCIATED WITH DIABETES: ICD-10-CM

## 2023-07-18 DIAGNOSIS — N18.6 TYPE 2 DIABETES MELLITUS WITH CHRONIC KIDNEY DISEASE ON CHRONIC DIALYSIS, WITH LONG-TERM CURRENT USE OF INSULIN: ICD-10-CM

## 2023-07-18 DIAGNOSIS — Z79.4 TYPE 2 DIABETES MELLITUS WITH CHRONIC KIDNEY DISEASE ON CHRONIC DIALYSIS, WITH LONG-TERM CURRENT USE OF INSULIN: ICD-10-CM

## 2023-07-18 PROCEDURE — 99214 OFFICE O/P EST MOD 30 MIN: CPT | Mod: 25,S$GLB,, | Performed by: PODIATRIST

## 2023-07-18 PROCEDURE — 11042 PR DEBRIDEMENT, SKIN, SUB-Q TISSUE,=<20 SQ CM: ICD-10-PCS | Mod: S$GLB,,, | Performed by: PODIATRIST

## 2023-07-18 PROCEDURE — 1159F PR MEDICATION LIST DOCUMENTED IN MEDICAL RECORD: ICD-10-PCS | Mod: CPTII,,, | Performed by: PODIATRIST

## 2023-07-18 PROCEDURE — 3079F DIAST BP 80-89 MM HG: CPT | Mod: CPTII,,, | Performed by: PODIATRIST

## 2023-07-18 PROCEDURE — 3052F HG A1C>EQUAL 8.0%<EQUAL 9.0%: CPT | Mod: CPTII,,, | Performed by: PODIATRIST

## 2023-07-18 PROCEDURE — 3077F SYST BP >= 140 MM HG: CPT | Mod: CPTII,,, | Performed by: PODIATRIST

## 2023-07-18 PROCEDURE — 11042 DBRDMT SUBQ TIS 1ST 20SQCM/<: CPT | Performed by: PODIATRIST

## 2023-07-18 PROCEDURE — 3077F PR MOST RECENT SYSTOLIC BLOOD PRESSURE >= 140 MM HG: ICD-10-PCS | Mod: CPTII,,, | Performed by: PODIATRIST

## 2023-07-18 PROCEDURE — 1160F RVW MEDS BY RX/DR IN RCRD: CPT | Mod: CPTII,,, | Performed by: PODIATRIST

## 2023-07-18 PROCEDURE — 3079F PR MOST RECENT DIASTOLIC BLOOD PRESSURE 80-89 MM HG: ICD-10-PCS | Mod: CPTII,,, | Performed by: PODIATRIST

## 2023-07-18 PROCEDURE — 3052F PR MOST RECENT HEMOGLOBIN A1C LEVEL 8.0 - < 9.0%: ICD-10-PCS | Mod: CPTII,,, | Performed by: PODIATRIST

## 2023-07-18 PROCEDURE — 4010F ACE/ARB THERAPY RXD/TAKEN: CPT | Mod: CPTII,,, | Performed by: PODIATRIST

## 2023-07-18 PROCEDURE — 1159F MED LIST DOCD IN RCRD: CPT | Mod: CPTII,,, | Performed by: PODIATRIST

## 2023-07-18 PROCEDURE — 99213 OFFICE O/P EST LOW 20 MIN: CPT | Mod: 25 | Performed by: PODIATRIST

## 2023-07-18 PROCEDURE — 11042 DBRDMT SUBQ TIS 1ST 20SQCM/<: CPT | Mod: S$GLB,,, | Performed by: PODIATRIST

## 2023-07-18 PROCEDURE — 1160F PR REVIEW ALL MEDS BY PRESCRIBER/CLIN PHARMACIST DOCUMENTED: ICD-10-PCS | Mod: CPTII,,, | Performed by: PODIATRIST

## 2023-07-18 PROCEDURE — 4010F PR ACE/ARB THEARPY RXD/TAKEN: ICD-10-PCS | Mod: CPTII,,, | Performed by: PODIATRIST

## 2023-07-18 PROCEDURE — 99214 PR OFFICE/OUTPT VISIT, EST, LEVL IV, 30-39 MIN: ICD-10-PCS | Mod: 25,S$GLB,, | Performed by: PODIATRIST

## 2023-07-18 RX ORDER — DOXYCYCLINE 100 MG/1
100 CAPSULE ORAL 2 TIMES DAILY
Qty: 28 CAPSULE | Refills: 0 | Status: SHIPPED | OUTPATIENT
Start: 2023-07-18 | End: 2023-08-01

## 2023-07-20 NOTE — PROGRESS NOTES
[address] [facility] [address]  [place] [address]  Phone: [phone]   [phone]    Patient's PCP:Stefano Lopez MD  Referring Provider: Aaareferral Self    Subjective:      Chief Complaint:: Wound Care, Wound Consult, Diabetes, Wound Check, Numbness, Non-healing Wound, Pressure Ulcer, Foot Ulcer, Diabetic Foot Ulcer, and Difficulty Walking    HPI  Leanna García is a 57 y.o. female who presents today with reopened left plantar foot diabetic foot ulcer.  See wound docs documentation for full assessment and evaluation of all wounds.       Reviewed all notes from patients medical chart from last several months, including imaging, procedures, studies, progress notes,  cultures, antibiotic regimens, photos,  etc.      1. Debridement of left plantar foot diabetic foot ulcer. See Wound Docs for assessment of wounds and procedure notes  2. Continue taking all medications as prescribed  3. Dressing changes, see Wound docs for dressing change orders  4. RTC one week   5. Counseled patient on increasing protein intake, not getting wound wet, keeping dressing clean dry and intact, following a healthy diet, elevating legs when able, removing pressure from wound     Total time spent for E&M 40  Total time for debridement 35 minutes      Proper ulcer care and the possible need for serial debridements, topical medications, specific dressings and biological engineered skin substitutes if indicated.        Counseling/Education:  I provided patient education verbally regarding:   The aspects of diabetes and how it pertains to the feet. I explained the importance of proper diabetic foot care and how it is essential for the health of their feet.     I discussed the importance of knowing their Hemoglobin A1c and that the level needs to be as close to 6 as possible. I discussed the increase complications of high blood sugar including stroke, blindness, heart attack, kidney failure and loss of limb secondary to neuropathy  and PVD.      With neuropathy, beware of any breaks in the skin or redness. These areas are not recognized early due to the numbness.     I discussed Diabetes, lower back issues, metabolic disorders, systemic causes, chemotherapy, vitamin deficiency, heavy metal exposure, as some of the causes. I also explained that as much as 40% of the time we can not find a cause. I discussed different treatments available to control the symptoms but which may not cure the problem.         Counseled patient on the aspects of diabetes and how it pertains to the feet.  I explained the importance of proper diabetic foot care and how it is essential for the health of their feet.        Shoe inspection. Patient instructed on proper foot hygeine. We discussed wearing proper shoe gear, daily foot inspections, never walking without protective shoe gear, never putting sharp instruments to feet, routine podiatric nail visits every 2-3 months.          Patient should call the office immediately if any signs of infection, such as fever, chills, sweats, increased redness or pain.     Patient was instructed to call the clinic or go to the emergency department if their symptoms do not improve, worsens, or if new symptoms develop.  Patient was advised that if any increased swelling, pain, or numbness arise to go immediately to the ED. Patient knows to call any time if an emergency arises. Shared decision making occurred and patient verbalized understanding in agreement with this plan.         >50% of this > 45 minute visit was spent face to face educating/counseling the patient     I spent a total of 45 minutes on the day of the visit.This includes face to face time and non-face to face time preparing to see the patient (eg, review of tests), obtaining and/or reviewing separately obtained history, documenting clinical information in the electronic or other health record, independently interpreting results and communicating results to the  patient/family/caregiver, or care coordinator.        Much of the documentation for this visit was completed in the Wound Docs system.  Please see the attached documentation for further details about the patient's care. Scanned under the Media tab.         Alivia Bruno DPM     Vitals:    07/18/23 1327   BP: (!) 187/87   Pulse: 91   Resp: 16   Temp: 98.6 °F (37 °C)   PainSc: 0-No pain      Shoe Size:     Past Surgical History:   Procedure Laterality Date    ANGIOGRAPHY OF LOWER EXTREMITY Left 10/18/2022    Procedure: Angiogram Extremity Unilateral;  Surgeon: Ali Khoobehi, MD;  Location: [facility] CATH/EP LAB;  Service: Vascular;  Laterality: Left;    AV FISTULA PLACEMENT Left 8/29/2022    Procedure: CREATION, AV FISTULA;  Surgeon: Ali Khoobehi, MD;  Location: [facility] OR;  Service: Vascular;  Laterality: Left;    BONE BIOPSY Left 8/24/2022    Procedure: Biopsy-Bone;  Surgeon: Porfirio Ponce DPM;  Location: [facility] OR;  Service: Podiatry;  Laterality: Left;    COLONOSCOPY N/A 10/5/2016    Procedure: COLONOSCOPY;  Surgeon: Pillo Chanel MD;  Location: [facility] ENDO;  Service: Endoscopy;  Laterality: N/A;    COLONOSCOPY N/A 4/26/2022    Procedure: COLONOSCOPY;  Surgeon: Saravanan Rachel MD;  Location: [facility] ENDO;  Service: Endoscopy;  Laterality: N/A;    FISTULOGRAM Left 8/24/2022    Procedure: Fistulogram;  Surgeon: Ali Khoobehi, MD;  Location: [facility] CATH/EP LAB;  Service: Vascular;  Laterality: Left;    HERNIA REPAIR Bilateral 11/22/2016    inguinal    HYSTERECTOMY      INCISION AND DRAINAGE FOOT Left 5/13/2022    Procedure: INCISION AND DRAINAGE, FOOT;  Surgeon: Ricardo Bruno DPM;  Location: [facility] OR;  Service: Podiatry;  Laterality: Left;    OOPHORECTOMY      THROMBECTOMY, AV FISTULA, UPPER EXTREMITY, PERCUTANEOUS  8/24/2022    Procedure: THROMBECTOMY, AV FISTULA, UPPER EXTREMITY, PERCUTANEOUS;  Surgeon: Ali Khoobehi, MD;  Location: [facility] CATH/EP LAB;  Service: Vascular;;    TOE AMPUTATION Left 8/26/2022    Procedure:  AMPUTATION, TOE;  Surgeon: Porfirio Ponce DPM;  Location: [facility];  Service: Podiatry;  Laterality: Left;     Past Medical History:   Diagnosis Date    A-fib     resolved per patient    Anemia due to end stage renal disease 6/30/2022    Arthritis     Bronchitis     Cardiovascular event risk, ASCVD 10-year risk 6.7% 10/15/2016    Diabetes mellitus type II     Diabetic foot infection     Diabetic ulcer of left midfoot 05/05/2022    Disorder of kidney and ureter     Encounter for blood transfusion     ESRD (end stage renal disease) 05/18/2018    MANJINDER (generalized anxiety disorder) 10/25/2016    History of colon polyps 11/02/2016    Hyperlipidemia     Hypertension     Stroke      Family History   Problem Relation Age of Onset    Hyperlipidemia Mother     Hypertension Mother     Hypertension Father     Diabetes Father     Diabetes Sister     Diabetes Sister     Diabetes Maternal Aunt     Diabetes Maternal Grandmother     Heart disease Maternal Grandmother     Cancer Paternal Grandmother     Breast cancer Neg Hx     Colon cancer Neg Hx     Ovarian cancer Neg Hx         Social History:   Marital Status:   Alcohol History:  reports no history of alcohol use.  Tobacco History:  reports that she has never smoked. She has never used smokeless tobacco.  Drug History:  reports no history of drug use.    Review of patient's allergies indicates:   Allergen Reactions    Dilaudid [hydromorphone] Nausea And Vomiting    Chlorhexidine Itching    Lortab [hydrocodone-acetaminophen] Itching       Current Outpatient Medications   Medication Sig Dispense Refill    acetaminophen (TYLENOL) 325 MG tablet Take 325 mg by mouth every 6 (six) hours.      ALPRAZolam (XANAX) 0.5 MG tablet Take 1 tablet by mouth.      atorvastatin (LIPITOR) 80 MG tablet Take 1 tablet (80 mg total) by mouth once daily. 90 tablet 3    azelastine (ASTELIN) 137 mcg (0.1 %) nasal spray 1 spray (137 mcg total) by Nasal route 2 (two) times daily. 30 mL 3    blood  sugar diagnostic Strp To check BG 3 times daily, to use with insurance preferred meter 100 strip 1    blood sugar diagnostic Strp To check BG 4 times daily, to use with insurance preferred meter 450 strip 3    blood sugar diagnostic Strp To check BG 1 times daily, to use with insurance preferred meter 100 each 11    blood-glucose meter kit To check BG 3 times daily, to use with insurance preferred meter 1 each 0    blood-glucose meter kit To check BG 3 times daily, to use with insurance preferred meter 1 each 0    cetirizine (ZYRTEC) 10 MG tablet Take 1 tablet (10 mg total) by mouth every other day. 45 tablet 3    clopidogreL (PLAVIX) 75 mg tablet Take 1 tablet (75 mg total) by mouth once daily. 90 tablet 3    doxycycline (MONODOX) 100 MG capsule Take 1 capsule (100 mg total) by mouth 2 (two) times daily. 20 capsule 0    doxycycline (VIBRAMYCIN) 100 MG Cap Take 1 capsule (100 mg total) by mouth 2 (two) times daily. for 14 days 28 capsule 0    epoetin chris-epbx (RETACRIT) 4,000 unit/mL injection Inject 1.11 mLs (4,440 Units total) into the skin every Mon, Wed, Fri.      EScitalopram oxalate (LEXAPRO) 10 MG tablet Take 1 tablet (10 mg total) by mouth once daily. 30 tablet 11    fluticasone propionate (FLONASE) 50 mcg/actuation nasal spray 2 sprays (100 mcg total) by Each Nostril route once daily. 16 g 3    gabapentin (NEURONTIN) 100 MG capsule Take 1 capsule (100 mg total) by mouth 2 (two) times daily. 180 capsule 3    hydrALAZINE (APRESOLINE) 25 MG tablet Take 1 tablet (25 mg total) by mouth 2 (two) times daily as needed (Systolic blood pressure > 170 mm Hg).      lancets Misc To check BG 3 times daily, to use with insurance preferred meter 100 each 1    lancets Misc To check BG 1 times daily, to use with insurance preferred meter 100 each 11    minoxidiL (LONITEN) 10 MG Tab Take 10 mg by mouth 2 (two) times daily.      multivitamin (THERAGRAN) per tablet Take 1 tablet by mouth.      pen needle, diabetic (BD  "ULTRA-FINE ROBERT PEN NEEDLE) 32 gauge x 5/32" Ndle 1 pen by Misc.(Non-Drug; Combo Route) route 4 (four) times daily. To use 4 times per day with insulin injections. 100 each 1    semaglutide (OZEMPIC) 0.25 mg or 0.5 mg (2 mg/3 mL) pen injector Inject 0.25 mg into the skin every 7 days. 1.5 mL 2    sucroferric oxyhydroxide (VELPHORO) 500 mg Chew Take 3 tablets by mouth.       No current facility-administered medications for this visit.       Review of Systems   Constitutional:  Negative for chills, fatigue, fever and unexpected weight change.   HENT:  Negative for hearing loss and trouble swallowing.    Eyes:  Negative for photophobia and visual disturbance.   Respiratory:  Negative for cough, shortness of breath and wheezing.    Cardiovascular:  Negative for chest pain, palpitations and leg swelling.   Gastrointestinal:  Negative for abdominal pain and nausea.   Genitourinary:  Negative for dysuria and frequency.   Musculoskeletal:  Positive for gait problem. Negative for arthralgias, back pain, joint swelling and myalgias.   Skin:  Positive for wound. Negative for rash.   Neurological:  Positive for numbness. Negative for tremors, seizures, weakness and headaches.   Hematological:  Does not bruise/bleed easily.       Objective:        Physical Exam:   Foot Exam  Physical Exam  Ortho/SPM Exam     Imaging:            Assessment:       1. Ulcer of left foot with fat layer exposed    2. Peripheral vascular disease    3. Type 2 diabetes mellitus with chronic kidney disease on chronic dialysis, with long-term current use of insulin    4. History of foot ulcer    5. At high risk for inadequate nutritional intake    6. Type 2 diabetes mellitus with hypoglycemia and coma, with long-term current use of insulin    7. 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 History of amputation of left foot    9. ESRD (end stage renal disease)    10. At risk for readmission to hospital    11. Bilateral lower extremity " edema    12. Difficulty in walking, not elsewhere classified    13. Hypertension associated with diabetes    14. 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 for 14 days  Dispense: 28 capsule; Refill: 0      Follow up in about 1 week (around 7/25/2023).    Procedures          Counseling:     I provided patient education verbally regarding:   Patient diagnosis, treatment options, as well as alternatives, risks, and benefits.     This note was created using Dragon voice recognition software that occasionally misinterpreted phrases or words.

## 2023-07-25 ENCOUNTER — OFFICE VISIT (OUTPATIENT)
Dept: WOUND CARE | Facility: HOSPITAL | Age: 58
End: 2023-07-25
Attending: PODIATRIST
Payer: MEDICARE

## 2023-07-25 ENCOUNTER — TELEPHONE (OUTPATIENT)
Dept: FAMILY MEDICINE | Facility: CLINIC | Age: 58
End: 2023-07-25
Payer: MEDICARE

## 2023-07-25 VITALS
TEMPERATURE: 98 F | RESPIRATION RATE: 16 BRPM | HEART RATE: 69 BPM | SYSTOLIC BLOOD PRESSURE: 146 MMHG | DIASTOLIC BLOOD PRESSURE: 63 MMHG

## 2023-07-25 DIAGNOSIS — Z99.2 TYPE 2 DIABETES MELLITUS WITH CHRONIC KIDNEY DISEASE ON CHRONIC DIALYSIS, WITH LONG-TERM CURRENT USE OF INSULIN: ICD-10-CM

## 2023-07-25 DIAGNOSIS — L97.513 ULCER OF RIGHT FOOT WITH NECROSIS OF MUSCLE: ICD-10-CM

## 2023-07-25 DIAGNOSIS — R60.0 BILATERAL LOWER EXTREMITY EDEMA: ICD-10-CM

## 2023-07-25 DIAGNOSIS — N18.6 ESRD (END STAGE RENAL DISEASE): ICD-10-CM

## 2023-07-25 DIAGNOSIS — R09.89 DECREASED PEDAL PULSES: ICD-10-CM

## 2023-07-25 DIAGNOSIS — L02.619 CELLULITIS AND ABSCESS OF FOOT: ICD-10-CM

## 2023-07-25 DIAGNOSIS — E11.641 TYPE 2 DIABETES MELLITUS WITH HYPOGLYCEMIA AND COMA, WITH LONG-TERM CURRENT USE OF INSULIN: ICD-10-CM

## 2023-07-25 DIAGNOSIS — Z79.4 TYPE 2 DIABETES MELLITUS WITH HYPOGLYCEMIA AND COMA, WITH LONG-TERM CURRENT USE OF INSULIN: ICD-10-CM

## 2023-07-25 DIAGNOSIS — E11.628 DIABETIC FOOT INFECTION: ICD-10-CM

## 2023-07-25 DIAGNOSIS — Z91.89 AT HIGH RISK FOR INADEQUATE NUTRITIONAL INTAKE: ICD-10-CM

## 2023-07-25 DIAGNOSIS — I73.9 PERIPHERAL VASCULAR DISEASE: ICD-10-CM

## 2023-07-25 DIAGNOSIS — Z87.2 HISTORY OF FOOT ULCER: ICD-10-CM

## 2023-07-25 DIAGNOSIS — E11.59 HYPERTENSION ASSOCIATED WITH DIABETES: ICD-10-CM

## 2023-07-25 DIAGNOSIS — Z89.432 HISTORY OF AMPUTATION OF LEFT FOOT: ICD-10-CM

## 2023-07-25 DIAGNOSIS — Z91.89 AT RISK FOR READMISSION TO HOSPITAL: ICD-10-CM

## 2023-07-25 DIAGNOSIS — Z86.73 HISTORY OF TIA (TRANSIENT ISCHEMIC ATTACK): ICD-10-CM

## 2023-07-25 DIAGNOSIS — E11.649 TYPE 2 DIABETES MELLITUS WITH HYPOGLYCEMIA UNAWARENESS: ICD-10-CM

## 2023-07-25 DIAGNOSIS — L08.9 DIABETIC FOOT INFECTION: ICD-10-CM

## 2023-07-25 DIAGNOSIS — N18.6 TYPE 2 DIABETES MELLITUS WITH CHRONIC KIDNEY DISEASE ON CHRONIC DIALYSIS, WITH LONG-TERM CURRENT USE OF INSULIN: ICD-10-CM

## 2023-07-25 DIAGNOSIS — R26.2 DIFFICULTY IN WALKING, NOT ELSEWHERE CLASSIFIED: ICD-10-CM

## 2023-07-25 DIAGNOSIS — L60.2 ONYCHOGRYPHOSIS: ICD-10-CM

## 2023-07-25 DIAGNOSIS — E78.5 HYPERLIPIDEMIA ASSOCIATED WITH TYPE 2 DIABETES MELLITUS: Primary | ICD-10-CM

## 2023-07-25 DIAGNOSIS — L03.119 CELLULITIS AND ABSCESS OF FOOT: ICD-10-CM

## 2023-07-25 DIAGNOSIS — E11.69 HYPERLIPIDEMIA ASSOCIATED WITH TYPE 2 DIABETES MELLITUS: Primary | ICD-10-CM

## 2023-07-25 DIAGNOSIS — L97.522 ULCER OF LEFT FOOT WITH FAT LAYER EXPOSED: Primary | ICD-10-CM

## 2023-07-25 DIAGNOSIS — I15.2 HYPERTENSION ASSOCIATED WITH DIABETES: ICD-10-CM

## 2023-07-25 DIAGNOSIS — Z79.4 TYPE 2 DIABETES MELLITUS WITH CHRONIC KIDNEY DISEASE ON CHRONIC DIALYSIS, WITH LONG-TERM CURRENT USE OF INSULIN: ICD-10-CM

## 2023-07-25 DIAGNOSIS — E11.22 TYPE 2 DIABETES MELLITUS WITH CHRONIC KIDNEY DISEASE ON CHRONIC DIALYSIS, WITH LONG-TERM CURRENT USE OF INSULIN: ICD-10-CM

## 2023-07-25 PROCEDURE — 99214 OFFICE O/P EST MOD 30 MIN: CPT | Mod: 25,,, | Performed by: PODIATRIST

## 2023-07-25 PROCEDURE — 3078F PR MOST RECENT DIASTOLIC BLOOD PRESSURE < 80 MM HG: ICD-10-PCS | Mod: CPTII,,, | Performed by: PODIATRIST

## 2023-07-25 PROCEDURE — 11721 PR DEBRIDEMENT OF NAILS, 6 OR MORE: ICD-10-PCS | Mod: Q7,59,, | Performed by: PODIATRIST

## 2023-07-25 PROCEDURE — 3077F SYST BP >= 140 MM HG: CPT | Mod: CPTII,,, | Performed by: PODIATRIST

## 2023-07-25 PROCEDURE — 11042 DBRDMT SUBQ TIS 1ST 20SQCM/<: CPT | Performed by: PODIATRIST

## 2023-07-25 PROCEDURE — 3052F HG A1C>EQUAL 8.0%<EQUAL 9.0%: CPT | Mod: CPTII,,, | Performed by: PODIATRIST

## 2023-07-25 PROCEDURE — 3077F PR MOST RECENT SYSTOLIC BLOOD PRESSURE >= 140 MM HG: ICD-10-PCS | Mod: CPTII,,, | Performed by: PODIATRIST

## 2023-07-25 PROCEDURE — 3052F PR MOST RECENT HEMOGLOBIN A1C LEVEL 8.0 - < 9.0%: ICD-10-PCS | Mod: CPTII,,, | Performed by: PODIATRIST

## 2023-07-25 PROCEDURE — 11042 DBRDMT SUBQ TIS 1ST 20SQCM/<: CPT | Mod: ,,, | Performed by: PODIATRIST

## 2023-07-25 PROCEDURE — 4010F ACE/ARB THERAPY RXD/TAKEN: CPT | Mod: CPTII,,, | Performed by: PODIATRIST

## 2023-07-25 PROCEDURE — 11721 DEBRIDE NAIL 6 OR MORE: CPT | Mod: Q7,59,, | Performed by: PODIATRIST

## 2023-07-25 PROCEDURE — 3078F DIAST BP <80 MM HG: CPT | Mod: CPTII,,, | Performed by: PODIATRIST

## 2023-07-25 PROCEDURE — 1159F MED LIST DOCD IN RCRD: CPT | Mod: CPTII,,, | Performed by: PODIATRIST

## 2023-07-25 PROCEDURE — 99214 PR OFFICE/OUTPT VISIT, EST, LEVL IV, 30-39 MIN: ICD-10-PCS | Mod: 25,,, | Performed by: PODIATRIST

## 2023-07-25 PROCEDURE — 1159F PR MEDICATION LIST DOCUMENTED IN MEDICAL RECORD: ICD-10-PCS | Mod: CPTII,,, | Performed by: PODIATRIST

## 2023-07-25 PROCEDURE — 4010F PR ACE/ARB THEARPY RXD/TAKEN: ICD-10-PCS | Mod: CPTII,,, | Performed by: PODIATRIST

## 2023-07-25 PROCEDURE — 1160F RVW MEDS BY RX/DR IN RCRD: CPT | Mod: CPTII,,, | Performed by: PODIATRIST

## 2023-07-25 PROCEDURE — 1160F PR REVIEW ALL MEDS BY PRESCRIBER/CLIN PHARMACIST DOCUMENTED: ICD-10-PCS | Mod: CPTII,,, | Performed by: PODIATRIST

## 2023-07-25 PROCEDURE — 11042 PR DEBRIDEMENT, SKIN, SUB-Q TISSUE,=<20 SQ CM: ICD-10-PCS | Mod: ,,, | Performed by: PODIATRIST

## 2023-07-25 NOTE — TELEPHONE ENCOUNTER
----- Message from Archie Mcfarland sent at 7/25/2023  2:27 PM CDT -----  Regarding: advice  Contact: LELA DEL CASTILLO [[id number]]  Type: Needs Medical Advice  Who Called:  Lela    Symptoms (please be specific):  na    How long has patient had these symptoms:  na    Pharmacy name and phone #:  na    Best Call Back Number: [phone]    Additional Information: Asking to move lab orders back to [facility] from [facility]. Please call to advise.

## 2023-07-25 NOTE — PROGRESS NOTES
[address] [facility] [address]  [place] [address]  Phone: [phone]   [phone]    Patient's PCP:Stefano Lopez MD  Referring Provider: Aaareferral Self    Subjective:      Chief Complaint:: Wound Care, Wound Consult, Diabetic Foot Ulcer, Diabetes, Wound Check, Pressure Ulcer, Non-healing Wound, Numbness, Foot Ulcer, Difficulty Walking, Nail Problem, Diabetic Foot Exam, and Routine Foot Care    HPI  Leanna García is a 57 y.o. female who presents today with reopened left plantar foot diabetic foot ulcer.  See wound docs documentation for full assessment and evaluation of all wounds.      Additionally, patient presents to clinic today for evaluation and treatment of diabetic feet and for a diabetic foot exam.       Additionally, patient presents with complaints of long, thick toenails and callus both feet.  Gradual onset, worsening over past several weeks, aggravated by increased weight bearing, shoe gear, pressure.  Periodic debridement helps symptoms.    With patient's verbal consent, nails were aggressively reduced and debrided x 8  (right foot nails 1-5, left foot nails 2,3,and 5 ) to their soft tissue attachment mechanically and with nail nipper and electric , removing all offending nail and debris. Patient tolerated procedure well. Patient relates relief following the procedure. No anesthesia or hemostasis required. No blood loss.          Reviewed all notes from patients medical chart from last several months, including imaging, procedures, studies, progress notes,  cultures, antibiotic regimens, photos,  etc.      1. Debridement of left plantar foot diabetic foot ulcer. See Wound Docs for assessment of wounds and procedure notes  2. Continue taking all medications as prescribed  3. Dressing changes, see Wound docs for dressing change orders  4. RTC one week   5. Counseled patient on increasing protein intake, not getting wound wet, keeping dressing clean dry and intact, following a  healthy diet, elevating legs when able, removing pressure from wound     Total time spent for E&M 40  Total time for debridement 35 minutes      Proper ulcer care and the possible need for serial debridements, topical medications, specific dressings and biological engineered skin substitutes if indicated.        Counseling/Education:  I provided patient education verbally regarding:   The aspects of diabetes and how it pertains to the feet. I explained the importance of proper diabetic foot care and how it is essential for the health of their feet.     I discussed the importance of knowing their Hemoglobin A1c and that the level needs to be as close to 6 as possible. I discussed the increase complications of high blood sugar including stroke, blindness, heart attack, kidney failure and loss of limb secondary to neuropathy and PVD.      With neuropathy, beware of any breaks in the skin or redness. These areas are not recognized early due to the numbness.     I discussed Diabetes, lower back issues, metabolic disorders, systemic causes, chemotherapy, vitamin deficiency, heavy metal exposure, as some of the causes. I also explained that as much as 40% of the time we can not find a cause. I discussed different treatments available to control the symptoms but which may not cure the problem.         Counseled patient on the aspects of diabetes and how it pertains to the feet.  I explained the importance of proper diabetic foot care and how it is essential for the health of their feet.        Shoe inspection. Patient instructed on proper foot hygeine. We discussed wearing proper shoe gear, daily foot inspections, never walking without protective shoe gear, never putting sharp instruments to feet, routine podiatric nail visits every 2-3 months.          Patient should call the office immediately if any signs of infection, such as fever, chills, sweats, increased redness or pain.     Patient was instructed to call the clinic  or go to the emergency department if their symptoms do not improve, worsens, or if new symptoms develop.  Patient was advised that if any increased swelling, pain, or numbness arise to go immediately to the ED. Patient knows to call any time if an emergency arises. Shared decision making occurred and patient verbalized understanding in agreement with this plan.         >50% of this > 45 minute visit was spent face to face educating/counseling the patient     I spent a total of 45 minutes on the day of the visit.This includes face to face time and non-face to face time preparing to see the patient (eg, review of tests), obtaining and/or reviewing separately obtained history, documenting clinical information in the electronic or other health record, independently interpreting results and communicating results to the patient/family/caregiver, or care coordinator.        Much of the documentation for this visit was completed in the Wound Docs system.  Please see the attached documentation for further details about the patient's care. Scanned under the Media tab.         Alivia Bruno DPM        Vitals:    07/25/23 0947   BP: (!) 146/63   Pulse: 69   Resp: 16   Temp: 98.4 °F (36.9 °C)   PainSc: 0-No pain      Shoe Size:     Past Surgical History:   Procedure Laterality Date    ANGIOGRAPHY OF LOWER EXTREMITY Left 10/18/2022    Procedure: Angiogram Extremity Unilateral;  Surgeon: Ali Khoobehi, MD;  Location: [facility] CATH/EP LAB;  Service: Vascular;  Laterality: Left;    AV FISTULA PLACEMENT Left 8/29/2022    Procedure: CREATION, AV FISTULA;  Surgeon: Ali Khoobehi, MD;  Location: [facility] OR;  Service: Vascular;  Laterality: Left;    BONE BIOPSY Left 8/24/2022    Procedure: Biopsy-Bone;  Surgeon: Porfirio Ponce DPM;  Location: [facility] OR;  Service: Podiatry;  Laterality: Left;    COLONOSCOPY N/A 10/5/2016    Procedure: COLONOSCOPY;  Surgeon: Pillo Chanel MD;  Location: [facility] ENDO;  Service: Endoscopy;  Laterality: N/A;     COLONOSCOPY N/A 4/26/2022    Procedure: COLONOSCOPY;  Surgeon: Saravanan Rachel MD;  Location: [facility] ENDO;  Service: Endoscopy;  Laterality: N/A;    FISTULOGRAM Left 8/24/2022    Procedure: Fistulogram;  Surgeon: Ali Khoobehi, MD;  Location: [facility] CATH/EP LAB;  Service: Vascular;  Laterality: Left;    HERNIA REPAIR Bilateral 11/22/2016    inguinal    HYSTERECTOMY      INCISION AND DRAINAGE FOOT Left 5/13/2022    Procedure: INCISION AND DRAINAGE, FOOT;  Surgeon: Ricardo Bruno DPM;  Location: [facility] OR;  Service: Podiatry;  Laterality: Left;    OOPHORECTOMY      THROMBECTOMY, AV FISTULA, UPPER EXTREMITY, PERCUTANEOUS  8/24/2022    Procedure: THROMBECTOMY, AV FISTULA, UPPER EXTREMITY, PERCUTANEOUS;  Surgeon: Ali Khoobehi, MD;  Location: [facility] CATH/EP LAB;  Service: Vascular;;    TOE AMPUTATION Left 8/26/2022    Procedure: AMPUTATION, TOE;  Surgeon: Porfirio Ponce DPM;  Location: [facility] OR;  Service: Podiatry;  Laterality: Left;     Past Medical History:   Diagnosis Date    A-fib     resolved per patient    Anemia due to end stage renal disease 6/30/2022    Arthritis     Bronchitis     Cardiovascular event risk, ASCVD 10-year risk 6.7% 10/15/2016    Diabetes mellitus type II     Diabetic foot infection     Diabetic ulcer of left midfoot 05/05/2022    Disorder of kidney and ureter     Encounter for blood transfusion     ESRD (end stage renal disease) 05/18/2018    MANJINDER (generalized anxiety disorder) 10/25/2016    History of colon polyps 11/02/2016    Hyperlipidemia     Hypertension     Stroke      Family History   Problem Relation Age of Onset    Hyperlipidemia Mother     Hypertension Mother     Hypertension Father     Diabetes Father     Diabetes Sister     Diabetes Sister     Diabetes Maternal Aunt     Diabetes Maternal Grandmother     Heart disease Maternal Grandmother     Cancer Paternal Grandmother     Breast cancer Neg Hx     Colon cancer Neg Hx     Ovarian cancer Neg Hx         Social History:   Marital Status:    Alcohol History:  reports no history of alcohol use.  Tobacco History:  reports that she has never smoked. She has never used smokeless tobacco.  Drug History:  reports no history of drug use.    Review of patient's allergies indicates:   Allergen Reactions    Dilaudid [hydromorphone] Nausea And Vomiting    Chlorhexidine Itching    Lortab [hydrocodone-acetaminophen] Itching       Current Outpatient Medications   Medication Sig Dispense Refill    acetaminophen (TYLENOL) 325 MG tablet Take 325 mg by mouth every 6 (six) hours.      ALPRAZolam (XANAX) 0.5 MG tablet Take 1 tablet by mouth.      atorvastatin (LIPITOR) 80 MG tablet Take 1 tablet (80 mg total) by mouth once daily. 90 tablet 3    azelastine (ASTELIN) 137 mcg (0.1 %) nasal spray 1 spray (137 mcg total) by Nasal route 2 (two) times daily. 30 mL 3    blood sugar diagnostic Strp To check BG 3 times daily, to use with insurance preferred meter 100 strip 1    blood sugar diagnostic Strp To check BG 4 times daily, to use with insurance preferred meter 450 strip 3    blood sugar diagnostic Strp To check BG 1 times daily, to use with insurance preferred meter 100 each 11    blood-glucose meter kit To check BG 3 times daily, to use with insurance preferred meter 1 each 0    blood-glucose meter kit To check BG 3 times daily, to use with insurance preferred meter 1 each 0    cetirizine (ZYRTEC) 10 MG tablet Take 1 tablet (10 mg total) by mouth every other day. 45 tablet 3    clopidogreL (PLAVIX) 75 mg tablet Take 1 tablet (75 mg total) by mouth once daily. 90 tablet 3    doxycycline (MONODOX) 100 MG capsule Take 1 capsule (100 mg total) by mouth 2 (two) times daily. 20 capsule 0    doxycycline (VIBRAMYCIN) 100 MG Cap Take 1 capsule (100 mg total) by mouth 2 (two) times daily. for 14 days 28 capsule 0    epoetin chris-epbx (RETACRIT) 4,000 unit/mL injection Inject 1.11 mLs (4,440 Units total) into the skin every Mon, Wed, Fri.      EScitalopram oxalate (LEXAPRO)  "10 MG tablet Take 1 tablet (10 mg total) by mouth once daily. 30 tablet 11    fluticasone propionate (FLONASE) 50 mcg/actuation nasal spray 2 sprays (100 mcg total) by Each Nostril route once daily. 16 g 3    gabapentin (NEURONTIN) 100 MG capsule Take 1 capsule (100 mg total) by mouth 2 (two) times daily. 180 capsule 3    hydrALAZINE (APRESOLINE) 25 MG tablet Take 1 tablet (25 mg total) by mouth 2 (two) times daily as needed (Systolic blood pressure > 170 mm Hg).      lancets Misc To check BG 3 times daily, to use with insurance preferred meter 100 each 1    lancets Misc To check BG 1 times daily, to use with insurance preferred meter 100 each 11    minoxidiL (LONITEN) 10 MG Tab Take 10 mg by mouth 2 (two) times daily.      multivitamin (THERAGRAN) per tablet Take 1 tablet by mouth.      pen needle, diabetic (BD ULTRA-FINE ROBERT PEN NEEDLE) 32 gauge x 5/32" Ndle 1 pen by Misc.(Non-Drug; Combo Route) route 4 (four) times daily. To use 4 times per day with insulin injections. 100 each 1    semaglutide (OZEMPIC) 0.25 mg or 0.5 mg (2 mg/3 mL) pen injector Inject 0.25 mg into the skin every 7 days. 1.5 mL 2    sucroferric oxyhydroxide (VELPHORO) 500 mg Chew Take 3 tablets by mouth.       No current facility-administered medications for this visit.       Review of Systems   Constitutional:  Negative for chills, fatigue, fever and unexpected weight change.   HENT:  Negative for hearing loss and trouble swallowing.    Eyes:  Negative for photophobia and visual disturbance.   Respiratory:  Negative for cough, shortness of breath and wheezing.    Cardiovascular:  Negative for chest pain, palpitations and leg swelling.   Gastrointestinal:  Negative for abdominal pain and nausea.   Genitourinary:  Negative for dysuria and frequency.   Musculoskeletal:  Positive for gait problem. Negative for arthralgias, back pain, joint swelling and myalgias.   Skin:  Positive for wound. Negative for rash.   Neurological:  Positive for " numbness. Negative for tremors, seizures, weakness and headaches.   Hematological:  Does not bruise/bleed easily.       Objective:        Physical Exam:   Foot Exam  Physical Exam  Ortho/SPM Exam     Imaging:            Assessment:       1. Ulcer of left foot with fat layer exposed    2. Peripheral vascular disease    3. Type 2 diabetes mellitus with chronic kidney disease on chronic dialysis, with long-term current use of insulin    4. Type 2 diabetes mellitus with hypoglycemia and coma, with long-term current use of insulin    5. At high risk for inadequate nutritional intake    6. History of foot ulcer    7. 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 History of amputation of left foot    9. ESRD (end stage renal disease)    10. Difficulty in walking, not elsewhere classified    11. History of TIA (transient ischemic attack)    12. Bilateral lower extremity edema    13. Hypertension associated with diabetes    14. At risk for readmission to hospital    15. Decreased pedal pulses    16. Diabetic foot infection    17. Cellulitis and abscess of foot    18. Ulcer of right foot with necrosis of muscle    19. 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    Procedures          Counseling:     I provided patient education verbally regarding:   Patient diagnosis, treatment options, as well as alternatives, risks, and benefits.     This note was created using Dragon voice recognition software that occasionally misinterpreted phrases or words.

## 2023-07-25 NOTE — TELEPHONE ENCOUNTER
----- Message from Archie Mcfarland sent at 7/25/2023  2:27 PM CDT -----  Regarding: advice  Contact: LELA DEL CASTILLO [[id number]]  Type: Needs Medical Advice  Who Called:  Lela    Symptoms (please be specific):  na    How long has patient had these symptoms:  na    Pharmacy name and phone #:  na    Best Call Back Number: [phone]    Additional Information: Asking to move lab orders back to [facility] from [facility]. Please call to advise.

## 2023-07-25 NOTE — TELEPHONE ENCOUNTER
Called and spoke to patient, requesting to have her lipid panel done here rather then [facility], orders placed, please review and sign if appropriate.

## 2023-07-27 ENCOUNTER — LAB VISIT (OUTPATIENT)
Dept: LAB | Facility: HOSPITAL | Age: 58
End: 2023-07-27
Attending: FAMILY MEDICINE
Payer: MEDICARE

## 2023-07-27 DIAGNOSIS — E11.69 HYPERLIPIDEMIA ASSOCIATED WITH TYPE 2 DIABETES MELLITUS: ICD-10-CM

## 2023-07-27 DIAGNOSIS — Z79.4 TYPE 2 DIABETES MELLITUS WITH CHRONIC KIDNEY DISEASE ON CHRONIC DIALYSIS, WITH LONG-TERM CURRENT USE OF INSULIN: ICD-10-CM

## 2023-07-27 DIAGNOSIS — N18.6 TYPE 2 DIABETES MELLITUS WITH CHRONIC KIDNEY DISEASE ON CHRONIC DIALYSIS, WITH LONG-TERM CURRENT USE OF INSULIN: ICD-10-CM

## 2023-07-27 DIAGNOSIS — Z99.2 TYPE 2 DIABETES MELLITUS WITH CHRONIC KIDNEY DISEASE ON CHRONIC DIALYSIS, WITH LONG-TERM CURRENT USE OF INSULIN: ICD-10-CM

## 2023-07-27 DIAGNOSIS — E78.5 HYPERLIPIDEMIA ASSOCIATED WITH TYPE 2 DIABETES MELLITUS: ICD-10-CM

## 2023-07-27 DIAGNOSIS — E11.22 TYPE 2 DIABETES MELLITUS WITH CHRONIC KIDNEY DISEASE ON CHRONIC DIALYSIS, WITH LONG-TERM CURRENT USE OF INSULIN: ICD-10-CM

## 2023-07-27 LAB
CHOLEST SERPL-MCNC: 199 MG/DL (ref 120–199)
CHOLEST/HDLC SERPL: 4.4 {RATIO} (ref 2–5)
HDLC SERPL-MCNC: 45 MG/DL (ref 40–75)
HDLC SERPL: 22.6 % (ref 20–50)
LDLC SERPL CALC-MCNC: 127.6 MG/DL (ref 63–159)
NONHDLC SERPL-MCNC: 154 MG/DL
TRIGL SERPL-MCNC: 132 MG/DL (ref 30–150)

## 2023-07-27 PROCEDURE — 80061 LIPID PANEL: CPT | Performed by: FAMILY MEDICINE

## 2023-07-27 PROCEDURE — 36415 COLL VENOUS BLD VENIPUNCTURE: CPT | Mod: PO | Performed by: FAMILY MEDICINE

## 2023-08-01 ENCOUNTER — OFFICE VISIT (OUTPATIENT)
Dept: WOUND CARE | Facility: HOSPITAL | Age: 58
End: 2023-08-01
Attending: PODIATRIST
Payer: MEDICARE

## 2023-08-01 VITALS
RESPIRATION RATE: 20 BRPM | DIASTOLIC BLOOD PRESSURE: 86 MMHG | HEART RATE: 76 BPM | TEMPERATURE: 99 F | SYSTOLIC BLOOD PRESSURE: 176 MMHG

## 2023-08-01 DIAGNOSIS — Z91.89 AT RISK FOR READMISSION TO HOSPITAL: ICD-10-CM

## 2023-08-01 DIAGNOSIS — L03.119 CELLULITIS AND ABSCESS OF FOOT: ICD-10-CM

## 2023-08-01 DIAGNOSIS — Z99.2 TYPE 2 DIABETES MELLITUS WITH CHRONIC KIDNEY DISEASE ON CHRONIC DIALYSIS, WITH LONG-TERM CURRENT USE OF INSULIN: Primary | ICD-10-CM

## 2023-08-01 DIAGNOSIS — Z87.2 HISTORY OF FOOT ULCER: ICD-10-CM

## 2023-08-01 DIAGNOSIS — Z79.4 TYPE 2 DIABETES MELLITUS WITH HYPOGLYCEMIA AND COMA, WITH LONG-TERM CURRENT USE OF INSULIN: ICD-10-CM

## 2023-08-01 DIAGNOSIS — L08.9 DIABETIC FOOT INFECTION: ICD-10-CM

## 2023-08-01 DIAGNOSIS — I73.9 PERIPHERAL VASCULAR DISEASE: ICD-10-CM

## 2023-08-01 DIAGNOSIS — L02.619 CELLULITIS AND ABSCESS OF FOOT: ICD-10-CM

## 2023-08-01 DIAGNOSIS — R26.2 DIFFICULTY IN WALKING, NOT ELSEWHERE CLASSIFIED: ICD-10-CM

## 2023-08-01 DIAGNOSIS — E11.649 TYPE 2 DIABETES MELLITUS WITH HYPOGLYCEMIA UNAWARENESS: ICD-10-CM

## 2023-08-01 DIAGNOSIS — R60.0 BILATERAL LOWER EXTREMITY EDEMA: ICD-10-CM

## 2023-08-01 DIAGNOSIS — Z89.432 HISTORY OF AMPUTATION OF LEFT FOOT: ICD-10-CM

## 2023-08-01 DIAGNOSIS — Z91.89 AT HIGH RISK FOR INADEQUATE NUTRITIONAL INTAKE: ICD-10-CM

## 2023-08-01 DIAGNOSIS — E11.22 TYPE 2 DIABETES MELLITUS WITH CHRONIC KIDNEY DISEASE ON CHRONIC DIALYSIS, WITH LONG-TERM CURRENT USE OF INSULIN: Primary | ICD-10-CM

## 2023-08-01 DIAGNOSIS — N18.6 ESRD (END STAGE RENAL DISEASE): ICD-10-CM

## 2023-08-01 DIAGNOSIS — I15.2 HYPERTENSION ASSOCIATED WITH DIABETES: ICD-10-CM

## 2023-08-01 DIAGNOSIS — L97.522 ULCER OF LEFT FOOT WITH FAT LAYER EXPOSED: ICD-10-CM

## 2023-08-01 DIAGNOSIS — Z79.4 TYPE 2 DIABETES MELLITUS WITH CHRONIC KIDNEY DISEASE ON CHRONIC DIALYSIS, WITH LONG-TERM CURRENT USE OF INSULIN: Primary | ICD-10-CM

## 2023-08-01 DIAGNOSIS — E11.628 DIABETIC FOOT INFECTION: ICD-10-CM

## 2023-08-01 DIAGNOSIS — E11.59 HYPERTENSION ASSOCIATED WITH DIABETES: ICD-10-CM

## 2023-08-01 DIAGNOSIS — Z86.73 HISTORY OF TIA (TRANSIENT ISCHEMIC ATTACK): ICD-10-CM

## 2023-08-01 DIAGNOSIS — N18.6 TYPE 2 DIABETES MELLITUS WITH CHRONIC KIDNEY DISEASE ON CHRONIC DIALYSIS, WITH LONG-TERM CURRENT USE OF INSULIN: Primary | ICD-10-CM

## 2023-08-01 DIAGNOSIS — E11.641 TYPE 2 DIABETES MELLITUS WITH HYPOGLYCEMIA AND COMA, WITH LONG-TERM CURRENT USE OF INSULIN: ICD-10-CM

## 2023-08-01 PROCEDURE — 99214 PR OFFICE/OUTPT VISIT, EST, LEVL IV, 30-39 MIN: ICD-10-PCS | Mod: 25,,, | Performed by: PODIATRIST

## 2023-08-01 PROCEDURE — 1159F PR MEDICATION LIST DOCUMENTED IN MEDICAL RECORD: ICD-10-PCS | Mod: CPTII,,, | Performed by: PODIATRIST

## 2023-08-01 PROCEDURE — 1159F MED LIST DOCD IN RCRD: CPT | Mod: CPTII,,, | Performed by: PODIATRIST

## 2023-08-01 PROCEDURE — 4010F ACE/ARB THERAPY RXD/TAKEN: CPT | Mod: CPTII,,, | Performed by: PODIATRIST

## 2023-08-01 PROCEDURE — 3052F PR MOST RECENT HEMOGLOBIN A1C LEVEL 8.0 - < 9.0%: ICD-10-PCS | Mod: CPTII,,, | Performed by: PODIATRIST

## 2023-08-01 PROCEDURE — 11042 PR DEBRIDEMENT, SKIN, SUB-Q TISSUE,=<20 SQ CM: ICD-10-PCS | Mod: ,,, | Performed by: PODIATRIST

## 2023-08-01 PROCEDURE — 87075 CULTR BACTERIA EXCEPT BLOOD: CPT | Performed by: PODIATRIST

## 2023-08-01 PROCEDURE — 3079F DIAST BP 80-89 MM HG: CPT | Mod: CPTII,,, | Performed by: PODIATRIST

## 2023-08-01 PROCEDURE — 87077 CULTURE AEROBIC IDENTIFY: CPT | Performed by: PODIATRIST

## 2023-08-01 PROCEDURE — 1160F RVW MEDS BY RX/DR IN RCRD: CPT | Mod: CPTII,,, | Performed by: PODIATRIST

## 2023-08-01 PROCEDURE — 11042 DBRDMT SUBQ TIS 1ST 20SQCM/<: CPT | Mod: ,,, | Performed by: PODIATRIST

## 2023-08-01 PROCEDURE — 3052F HG A1C>EQUAL 8.0%<EQUAL 9.0%: CPT | Mod: CPTII,,, | Performed by: PODIATRIST

## 2023-08-01 PROCEDURE — 3077F SYST BP >= 140 MM HG: CPT | Mod: CPTII,,, | Performed by: PODIATRIST

## 2023-08-01 PROCEDURE — 87070 CULTURE OTHR SPECIMN AEROBIC: CPT | Performed by: PODIATRIST

## 2023-08-01 PROCEDURE — 11042 DBRDMT SUBQ TIS 1ST 20SQCM/<: CPT | Performed by: PODIATRIST

## 2023-08-01 PROCEDURE — 87186 SC STD MICRODIL/AGAR DIL: CPT | Performed by: PODIATRIST

## 2023-08-01 PROCEDURE — 87076 CULTURE ANAEROBE IDENT EACH: CPT | Mod: 59 | Performed by: PODIATRIST

## 2023-08-01 PROCEDURE — 1160F PR REVIEW ALL MEDS BY PRESCRIBER/CLIN PHARMACIST DOCUMENTED: ICD-10-PCS | Mod: CPTII,,, | Performed by: PODIATRIST

## 2023-08-01 PROCEDURE — 3077F PR MOST RECENT SYSTOLIC BLOOD PRESSURE >= 140 MM HG: ICD-10-PCS | Mod: CPTII,,, | Performed by: PODIATRIST

## 2023-08-01 PROCEDURE — 4010F PR ACE/ARB THEARPY RXD/TAKEN: ICD-10-PCS | Mod: CPTII,,, | Performed by: PODIATRIST

## 2023-08-01 PROCEDURE — 3079F PR MOST RECENT DIASTOLIC BLOOD PRESSURE 80-89 MM HG: ICD-10-PCS | Mod: CPTII,,, | Performed by: PODIATRIST

## 2023-08-01 PROCEDURE — 99214 OFFICE O/P EST MOD 30 MIN: CPT | Mod: 25,,, | Performed by: PODIATRIST

## 2023-08-01 NOTE — PROGRESS NOTES
[address] [facility] [address]  [place] [address]  Phone: [phone]   [phone]    Patient's PCP:Stefano Lopez MD  Referring Provider: Aaareferral Self    Subjective:      Chief Complaint:: Wound Care, Diabetes, Wound Check, Wound Infection, Non-healing Wound, Pressure Ulcer, Numbness, Foot Ulcer, Difficulty Walking, and Diabetic Foot Ulcer    HPI  Leanna García is a 57 y.o. female who presents today with reopened left plantar foot diabetic foot ulcer.  See wound docs documentation for full assessment and evaluation of all wounds.      Additionally, patient presents to clinic today for evaluation and treatment of diabetic feet and for a diabetic foot exam.         Additionally, patient presents with complaints of long, thick toenails and callus both feet.  Gradual onset, worsening over past several weeks, aggravated by increased weight bearing, shoe gear, pressure.  Periodic debridement helps symptoms.     With patient's verbal consent, nails were aggressively reduced and debrided x 8  (right foot nails 1-5, left foot nails 2,3,and 5 ) to their soft tissue attachment mechanically and with nail nipper and electric , removing all offending nail and debris. Patient tolerated procedure well. Patient relates relief following the procedure. No anesthesia or hemostasis required. No blood loss.             Reviewed all notes from patients medical chart from last several months, including imaging, procedures, studies, progress notes,  cultures, antibiotic regimens, photos,  etc.      1. Debridement of left plantar foot diabetic foot ulcer. See Wound Docs for assessment of wounds and procedure notes  2. Continue taking all medications as prescribed  3. Dressing changes, see Wound docs for dressing change orders  4. RTC one week   5. Counseled patient on increasing protein intake, not getting wound wet, keeping dressing clean dry and intact, following a healthy diet, elevating legs when able, removing  pressure from wound     Total time spent for E&M 40  Total time for debridement 35 minutes       Culture taken of wound.  I will adjust antibiotics accordingly once the results of the culture return       Proper ulcer care and the possible need for serial debridements, topical medications, specific dressings and biological engineered skin substitutes if indicated.        Counseling/Education:  I provided patient education verbally regarding:   The aspects of diabetes and how it pertains to the feet. I explained the importance of proper diabetic foot care and how it is essential for the health of their feet.     I discussed the importance of knowing their Hemoglobin A1c and that the level needs to be as close to 6 as possible. I discussed the increase complications of high blood sugar including stroke, blindness, heart attack, kidney failure and loss of limb secondary to neuropathy and PVD.      With neuropathy, beware of any breaks in the skin or redness. These areas are not recognized early due to the numbness.     I discussed Diabetes, lower back issues, metabolic disorders, systemic causes, chemotherapy, vitamin deficiency, heavy metal exposure, as some of the causes. I also explained that as much as 40% of the time we can not find a cause. I discussed different treatments available to control the symptoms but which may not cure the problem.         Counseled patient on the aspects of diabetes and how it pertains to the feet.  I explained the importance of proper diabetic foot care and how it is essential for the health of their feet.        Shoe inspection. Patient instructed on proper foot hygeine. We discussed wearing proper shoe gear, daily foot inspections, never walking without protective shoe gear, never putting sharp instruments to feet, routine podiatric nail visits every 2-3 months.          Patient should call the office immediately if any signs of infection, such as fever, chills, sweats, increased  redness or pain.     Patient was instructed to call the clinic or go to the emergency department if their symptoms do not improve, worsens, or if new symptoms develop.  Patient was advised that if any increased swelling, pain, or numbness arise to go immediately to the ED. Patient knows to call any time if an emergency arises. Shared decision making occurred and patient verbalized understanding in agreement with this plan.         >50% of this > 45 minute visit was spent face to face educating/counseling the patient     I spent a total of 45 minutes on the day of the visit.This includes face to face time and non-face to face time preparing to see the patient (eg, review of tests), obtaining and/or reviewing separately obtained history, documenting clinical information in the electronic or other health record, independently interpreting results and communicating results to the patient/family/caregiver, or care coordinator.        Much of the documentation for this visit was completed in the Wound Docs system.  Please see the attached documentation for further details about the patient's care. Scanned under the Media tab.         Alivia Bruno DPM     Vitals:    08/01/23 1005   BP: (!) 176/86   Pulse: 76   Resp: 20   Temp: 98.5 °F (36.9 °C)   PainSc: 0-No pain      Shoe Size:     Past Surgical History:   Procedure Laterality Date    ANGIOGRAPHY OF LOWER EXTREMITY Left 10/18/2022    Procedure: Angiogram Extremity Unilateral;  Surgeon: Ali Khoobehi, MD;  Location: [facility] CATH/EP LAB;  Service: Vascular;  Laterality: Left;    AV FISTULA PLACEMENT Left 8/29/2022    Procedure: CREATION, AV FISTULA;  Surgeon: Ali Khoobehi, MD;  Location: [facility] OR;  Service: Vascular;  Laterality: Left;    BONE BIOPSY Left 8/24/2022    Procedure: Biopsy-Bone;  Surgeon: Porfirio Ponce DPM;  Location: [facility] OR;  Service: Podiatry;  Laterality: Left;    COLONOSCOPY N/A 10/5/2016    Procedure: COLONOSCOPY;  Surgeon: Pillo Chanel MD;   Location: [facility] ENDO;  Service: Endoscopy;  Laterality: N/A;    COLONOSCOPY N/A 4/26/2022    Procedure: COLONOSCOPY;  Surgeon: Saravanan Rachel MD;  Location: [facility] ENDO;  Service: Endoscopy;  Laterality: N/A;    FISTULOGRAM Left 8/24/2022    Procedure: Fistulogram;  Surgeon: Ali Khoobehi, MD;  Location: [facility] CATH/EP LAB;  Service: Vascular;  Laterality: Left;    HERNIA REPAIR Bilateral 11/22/2016    inguinal    HYSTERECTOMY      INCISION AND DRAINAGE FOOT Left 5/13/2022    Procedure: INCISION AND DRAINAGE, FOOT;  Surgeon: Ricardo Bruno DPM;  Location: [facility] OR;  Service: Podiatry;  Laterality: Left;    OOPHORECTOMY      THROMBECTOMY, AV FISTULA, UPPER EXTREMITY, PERCUTANEOUS  8/24/2022    Procedure: THROMBECTOMY, AV FISTULA, UPPER EXTREMITY, PERCUTANEOUS;  Surgeon: Ali Khoobehi, MD;  Location: [facility] CATH/EP LAB;  Service: Vascular;;    TOE AMPUTATION Left 8/26/2022    Procedure: AMPUTATION, TOE;  Surgeon: Porfirio Ponce DPM;  Location: [facility] OR;  Service: Podiatry;  Laterality: Left;     Past Medical History:   Diagnosis Date    A-fib     resolved per patient    Anemia due to end stage renal disease 6/30/2022    Arthritis     Bronchitis     Cardiovascular event risk, ASCVD 10-year risk 6.7% 10/15/2016    Diabetes mellitus type II     Diabetic foot infection     Diabetic ulcer of left midfoot 05/05/2022    Disorder of kidney and ureter     Encounter for blood transfusion     ESRD (end stage renal disease) 05/18/2018    MANJINDER (generalized anxiety disorder) 10/25/2016    History of colon polyps 11/02/2016    Hyperlipidemia     Hypertension     Stroke      Family History   Problem Relation Age of Onset    Hyperlipidemia Mother     Hypertension Mother     Hypertension Father     Diabetes Father     Diabetes Sister     Diabetes Sister     Diabetes Maternal Aunt     Diabetes Maternal Grandmother     Heart disease Maternal Grandmother     Cancer Paternal Grandmother     Breast cancer Neg Hx     Colon cancer Neg Hx      Ovarian cancer Neg Hx         Social History:   Marital Status:   Alcohol History:  reports no history of alcohol use.  Tobacco History:  reports that she has never smoked. She has never used smokeless tobacco.  Drug History:  reports no history of drug use.    Review of patient's allergies indicates:   Allergen Reactions    Dilaudid [hydromorphone] Nausea And Vomiting    Chlorhexidine Itching    Lortab [hydrocodone-acetaminophen] Itching       Current Outpatient Medications   Medication Sig Dispense Refill    acetaminophen (TYLENOL) 325 MG tablet Take 325 mg by mouth every 6 (six) hours.      ALPRAZolam (XANAX) 0.5 MG tablet Take 1 tablet by mouth.      atorvastatin (LIPITOR) 80 MG tablet Take 1 tablet (80 mg total) by mouth once daily. 90 tablet 3    azelastine (ASTELIN) 137 mcg (0.1 %) nasal spray 1 spray (137 mcg total) by Nasal route 2 (two) times daily. 30 mL 3    blood sugar diagnostic Strp To check BG 3 times daily, to use with insurance preferred meter 100 strip 1    blood sugar diagnostic Strp To check BG 4 times daily, to use with insurance preferred meter 450 strip 3    blood sugar diagnostic Strp To check BG 1 times daily, to use with insurance preferred meter 100 each 11    blood-glucose meter kit To check BG 3 times daily, to use with insurance preferred meter 1 each 0    blood-glucose meter kit To check BG 3 times daily, to use with insurance preferred meter 1 each 0    cetirizine (ZYRTEC) 10 MG tablet Take 1 tablet (10 mg total) by mouth every other day. 45 tablet 3    clopidogreL (PLAVIX) 75 mg tablet Take 1 tablet (75 mg total) by mouth once daily. 90 tablet 3    doxycycline (MONODOX) 100 MG capsule Take 1 capsule (100 mg total) by mouth 2 (two) times daily. 20 capsule 0    epoetin chris-epbx (RETACRIT) 4,000 unit/mL injection Inject 1.11 mLs (4,440 Units total) into the skin every Mon, Wed, Fri.      EScitalopram oxalate (LEXAPRO) 10 MG tablet Take 1 tablet (10 mg total) by mouth once  "daily. 30 tablet 11    fluticasone propionate (FLONASE) 50 mcg/actuation nasal spray 2 sprays (100 mcg total) by Each Nostril route once daily. 16 g 3    gabapentin (NEURONTIN) 100 MG capsule Take 1 capsule (100 mg total) by mouth 2 (two) times daily. 180 capsule 3    hydrALAZINE (APRESOLINE) 25 MG tablet Take 1 tablet (25 mg total) by mouth 2 (two) times daily as needed (Systolic blood pressure > 170 mm Hg).      lancets Misc To check BG 3 times daily, to use with insurance preferred meter 100 each 1    lancets Misc To check BG 1 times daily, to use with insurance preferred meter 100 each 11    minoxidiL (LONITEN) 10 MG Tab Take 10 mg by mouth 2 (two) times daily.      multivitamin (THERAGRAN) per tablet Take 1 tablet by mouth.      pen needle, diabetic (BD ULTRA-FINE ROBERT PEN NEEDLE) 32 gauge x 5/32" Ndle 1 pen by Misc.(Non-Drug; Combo Route) route 4 (four) times daily. To use 4 times per day with insulin injections. 100 each 1    semaglutide (OZEMPIC) 0.25 mg or 0.5 mg (2 mg/3 mL) pen injector Inject 0.25 mg into the skin every 7 days. 1.5 mL 2    sucroferric oxyhydroxide (VELPHORO) 500 mg Chew Take 3 tablets by mouth.       No current facility-administered medications for this visit.       Review of Systems   Constitutional:  Negative for chills, fatigue, fever and unexpected weight change.   HENT:  Negative for hearing loss and trouble swallowing.    Eyes:  Negative for photophobia and visual disturbance.   Respiratory:  Negative for cough, shortness of breath and wheezing.    Cardiovascular:  Negative for chest pain, palpitations and leg swelling.   Gastrointestinal:  Negative for abdominal pain and nausea.   Genitourinary:  Negative for dysuria and frequency.   Musculoskeletal:  Positive for gait problem. Negative for arthralgias, back pain, joint swelling and myalgias.   Skin:  Positive for wound. Negative for rash.   Neurological:  Positive for numbness. Negative for tremors, seizures, weakness and " headaches.   Hematological:  Does not bruise/bleed easily.         Objective:        Physical Exam:   Foot Exam  Physical Exam  Ortho/SPM Exam     Imaging:            Assessment:       1. Type 2 diabetes mellitus with chronic kidney disease on chronic dialysis, with long-term current use of insulin    2. Type 2 diabetes mellitus with hypoglycemia and coma, with long-term current use of insulin    3. At high risk for inadequate nutritional intake    4. Difficulty in walking, not elsewhere classified    5. ESRD (end stage renal disease)    6. History of amputation of left foot    7. 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 Peripheral vascular disease    9. Ulcer of left foot with fat layer exposed    10. History of foot ulcer    11. History of TIA (transient ischemic attack)    12. Bilateral lower extremity edema    13. Hypertension associated with diabetes    14. Diabetic foot infection    15. Cellulitis and abscess of foot    16. 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    Procedures          Counseling:     I provided patient  education verbally regarding:   Patient diagnosis, treatment options, as well as alternatives, risks, and benefits.     This note was created using Dragon voice recognition software that occasionally misinterpreted phrases or words.

## 2023-08-03 RX ORDER — DOXYCYCLINE 100 MG/1
100 CAPSULE ORAL 2 TIMES DAILY
Qty: 28 CAPSULE | Refills: 0 | Status: SHIPPED | OUTPATIENT
Start: 2023-08-03 | End: 2023-08-17

## 2023-08-03 RX ORDER — LEVOFLOXACIN 500 MG/1
500 TABLET, FILM COATED ORAL DAILY
Qty: 14 TABLET | Refills: 0 | Status: SHIPPED | OUTPATIENT
Start: 2023-08-03 | End: 2023-08-17

## 2023-08-03 NOTE — PROGRESS NOTES
Please contact patient and let her know that her culture is growing bacteria and I am going to call in an antibiotic for her to start taking.  When her final culture results return I will adjust that antibiotic accordingly

## 2023-08-05 LAB
BACTERIA SPEC AEROBE CULT: ABNORMAL
BACTERIA SPEC AEROBE CULT: ABNORMAL

## 2023-08-06 DIAGNOSIS — Z99.2 TYPE 2 DIABETES MELLITUS WITH CHRONIC KIDNEY DISEASE ON CHRONIC DIALYSIS, WITH LONG-TERM CURRENT USE OF INSULIN: Primary | ICD-10-CM

## 2023-08-06 DIAGNOSIS — L97.509 ULCER OF FOOT, UNSPECIFIED LATERALITY, UNSPECIFIED ULCER STAGE: ICD-10-CM

## 2023-08-06 DIAGNOSIS — N18.6 TYPE 2 DIABETES MELLITUS WITH CHRONIC KIDNEY DISEASE ON CHRONIC DIALYSIS, WITH LONG-TERM CURRENT USE OF INSULIN: Primary | ICD-10-CM

## 2023-08-06 DIAGNOSIS — E11.22 TYPE 2 DIABETES MELLITUS WITH CHRONIC KIDNEY DISEASE ON CHRONIC DIALYSIS, WITH LONG-TERM CURRENT USE OF INSULIN: Primary | ICD-10-CM

## 2023-08-06 DIAGNOSIS — Z79.4 TYPE 2 DIABETES MELLITUS WITH CHRONIC KIDNEY DISEASE ON CHRONIC DIALYSIS, WITH LONG-TERM CURRENT USE OF INSULIN: Primary | ICD-10-CM

## 2023-08-06 RX ORDER — LEVOFLOXACIN 500 MG/1
500 TABLET, FILM COATED ORAL DAILY
Qty: 14 TABLET | Refills: 0 | Status: SHIPPED | OUTPATIENT
Start: 2023-08-06 | End: 2023-08-20

## 2023-08-06 NOTE — PROGRESS NOTES
Please contact patient and let her know she is to begin taking levofloxacin in order to cover the results of her culture- levofloxacin has been called into her pharmacy.

## 2023-08-07 LAB — BACTERIA SPEC ANAEROBE CULT: ABNORMAL

## 2023-08-08 ENCOUNTER — OFFICE VISIT (OUTPATIENT)
Dept: WOUND CARE | Facility: HOSPITAL | Age: 58
End: 2023-08-08
Attending: PODIATRIST
Payer: MEDICARE

## 2023-08-08 VITALS
TEMPERATURE: 98 F | DIASTOLIC BLOOD PRESSURE: 86 MMHG | RESPIRATION RATE: 19 BRPM | HEART RATE: 77 BPM | SYSTOLIC BLOOD PRESSURE: 147 MMHG

## 2023-08-08 DIAGNOSIS — Z79.4 TYPE 2 DIABETES MELLITUS WITH HYPOGLYCEMIA AND COMA, WITH LONG-TERM CURRENT USE OF INSULIN: ICD-10-CM

## 2023-08-08 DIAGNOSIS — Z86.73 HISTORY OF TIA (TRANSIENT ISCHEMIC ATTACK): ICD-10-CM

## 2023-08-08 DIAGNOSIS — E11.649 TYPE 2 DIABETES MELLITUS WITH HYPOGLYCEMIA UNAWARENESS: ICD-10-CM

## 2023-08-08 DIAGNOSIS — L08.9 DIABETIC FOOT INFECTION: ICD-10-CM

## 2023-08-08 DIAGNOSIS — Z99.2 TYPE 2 DIABETES MELLITUS WITH CHRONIC KIDNEY DISEASE ON CHRONIC DIALYSIS, WITH LONG-TERM CURRENT USE OF INSULIN: Primary | ICD-10-CM

## 2023-08-08 DIAGNOSIS — R60.0 BILATERAL LOWER EXTREMITY EDEMA: ICD-10-CM

## 2023-08-08 DIAGNOSIS — Z89.432 HISTORY OF AMPUTATION OF LEFT FOOT: ICD-10-CM

## 2023-08-08 DIAGNOSIS — L97.509 ULCER OF FOOT, UNSPECIFIED LATERALITY, UNSPECIFIED ULCER STAGE: ICD-10-CM

## 2023-08-08 DIAGNOSIS — L97.423 DIABETIC ULCER OF LEFT MIDFOOT ASSOCIATED WITH TYPE 2 DIABETES MELLITUS, WITH NECROSIS OF MUSCLE: ICD-10-CM

## 2023-08-08 DIAGNOSIS — I73.9 PERIPHERAL VASCULAR DISEASE: ICD-10-CM

## 2023-08-08 DIAGNOSIS — I15.2 HYPERTENSION ASSOCIATED WITH DIABETES: ICD-10-CM

## 2023-08-08 DIAGNOSIS — Z91.89 AT RISK FOR READMISSION TO HOSPITAL: ICD-10-CM

## 2023-08-08 DIAGNOSIS — Z91.89 AT HIGH RISK FOR INADEQUATE NUTRITIONAL INTAKE: ICD-10-CM

## 2023-08-08 DIAGNOSIS — L97.522 ULCER OF LEFT FOOT WITH FAT LAYER EXPOSED: ICD-10-CM

## 2023-08-08 DIAGNOSIS — N18.6 ESRD (END STAGE RENAL DISEASE): ICD-10-CM

## 2023-08-08 DIAGNOSIS — R09.89 DECREASED PEDAL PULSES: ICD-10-CM

## 2023-08-08 DIAGNOSIS — R26.2 DIFFICULTY IN WALKING, NOT ELSEWHERE CLASSIFIED: ICD-10-CM

## 2023-08-08 DIAGNOSIS — Z87.2 HISTORY OF FOOT ULCER: ICD-10-CM

## 2023-08-08 DIAGNOSIS — E11.628 DIABETIC FOOT INFECTION: ICD-10-CM

## 2023-08-08 DIAGNOSIS — L03.119 CELLULITIS AND ABSCESS OF FOOT: ICD-10-CM

## 2023-08-08 DIAGNOSIS — E11.22 TYPE 2 DIABETES MELLITUS WITH CHRONIC KIDNEY DISEASE ON CHRONIC DIALYSIS, WITH LONG-TERM CURRENT USE OF INSULIN: Primary | ICD-10-CM

## 2023-08-08 DIAGNOSIS — E11.621 DIABETIC ULCER OF LEFT MIDFOOT ASSOCIATED WITH TYPE 2 DIABETES MELLITUS, WITH NECROSIS OF MUSCLE: ICD-10-CM

## 2023-08-08 DIAGNOSIS — L02.619 CELLULITIS AND ABSCESS OF FOOT: ICD-10-CM

## 2023-08-08 DIAGNOSIS — E11.641 TYPE 2 DIABETES MELLITUS WITH HYPOGLYCEMIA AND COMA, WITH LONG-TERM CURRENT USE OF INSULIN: ICD-10-CM

## 2023-08-08 DIAGNOSIS — L60.2 ONYCHOGRYPHOSIS: ICD-10-CM

## 2023-08-08 DIAGNOSIS — Z79.4 TYPE 2 DIABETES MELLITUS WITH CHRONIC KIDNEY DISEASE ON CHRONIC DIALYSIS, WITH LONG-TERM CURRENT USE OF INSULIN: Primary | ICD-10-CM

## 2023-08-08 DIAGNOSIS — E11.59 HYPERTENSION ASSOCIATED WITH DIABETES: ICD-10-CM

## 2023-08-08 DIAGNOSIS — N18.6 TYPE 2 DIABETES MELLITUS WITH CHRONIC KIDNEY DISEASE ON CHRONIC DIALYSIS, WITH LONG-TERM CURRENT USE OF INSULIN: Primary | ICD-10-CM

## 2023-08-08 PROCEDURE — 3052F PR MOST RECENT HEMOGLOBIN A1C LEVEL 8.0 - < 9.0%: ICD-10-PCS | Mod: CPTII,,, | Performed by: PODIATRIST

## 2023-08-08 PROCEDURE — 3077F PR MOST RECENT SYSTOLIC BLOOD PRESSURE >= 140 MM HG: ICD-10-PCS | Mod: CPTII,,, | Performed by: PODIATRIST

## 2023-08-08 PROCEDURE — 99214 PR OFFICE/OUTPT VISIT, EST, LEVL IV, 30-39 MIN: ICD-10-PCS | Mod: 25,,, | Performed by: PODIATRIST

## 2023-08-08 PROCEDURE — 1160F RVW MEDS BY RX/DR IN RCRD: CPT | Mod: CPTII,,, | Performed by: PODIATRIST

## 2023-08-08 PROCEDURE — 1160F PR REVIEW ALL MEDS BY PRESCRIBER/CLIN PHARMACIST DOCUMENTED: ICD-10-PCS | Mod: CPTII,,, | Performed by: PODIATRIST

## 2023-08-08 PROCEDURE — 3052F HG A1C>EQUAL 8.0%<EQUAL 9.0%: CPT | Mod: CPTII,,, | Performed by: PODIATRIST

## 2023-08-08 PROCEDURE — 11042 DBRDMT SUBQ TIS 1ST 20SQCM/<: CPT | Mod: ,,, | Performed by: PODIATRIST

## 2023-08-08 PROCEDURE — 3079F DIAST BP 80-89 MM HG: CPT | Mod: CPTII,,, | Performed by: PODIATRIST

## 2023-08-08 PROCEDURE — 11042 DBRDMT SUBQ TIS 1ST 20SQCM/<: CPT | Performed by: PODIATRIST

## 2023-08-08 PROCEDURE — 3077F SYST BP >= 140 MM HG: CPT | Mod: CPTII,,, | Performed by: PODIATRIST

## 2023-08-08 PROCEDURE — 99214 OFFICE O/P EST MOD 30 MIN: CPT | Mod: 25,,, | Performed by: PODIATRIST

## 2023-08-08 PROCEDURE — 3079F PR MOST RECENT DIASTOLIC BLOOD PRESSURE 80-89 MM HG: ICD-10-PCS | Mod: CPTII,,, | Performed by: PODIATRIST

## 2023-08-08 PROCEDURE — 11042 PR DEBRIDEMENT, SKIN, SUB-Q TISSUE,=<20 SQ CM: ICD-10-PCS | Mod: ,,, | Performed by: PODIATRIST

## 2023-08-08 PROCEDURE — 1159F MED LIST DOCD IN RCRD: CPT | Mod: CPTII,,, | Performed by: PODIATRIST

## 2023-08-08 PROCEDURE — 4010F PR ACE/ARB THEARPY RXD/TAKEN: ICD-10-PCS | Mod: CPTII,,, | Performed by: PODIATRIST

## 2023-08-08 PROCEDURE — 1159F PR MEDICATION LIST DOCUMENTED IN MEDICAL RECORD: ICD-10-PCS | Mod: CPTII,,, | Performed by: PODIATRIST

## 2023-08-08 PROCEDURE — 4010F ACE/ARB THERAPY RXD/TAKEN: CPT | Mod: CPTII,,, | Performed by: PODIATRIST

## 2023-08-08 NOTE — PROGRESS NOTES
[address] [facility] [address]  [place] [address]  Phone: [phone]   [phone]    Patient's PCP:Stefano Lopez MD  Referring Provider: No ref. provider found    Subjective:      Chief Complaint:: Wound Care, Diabetic Foot Ulcer, Non-healing Wound Follow Up, Diabetes, Wound Check, Wound Infection, Results (Discuss results of recent culture), Peripheral Neuropathy, Numbness, Pressure Ulcer, Poor Circulation, Foot Ulcer, Non-healing Wound, and Difficulty Walking    HPI  Leanna García is a 57 y.o. female who presents today with reopened left plantar foot diabetic foot ulcer.  See wound docs documentation for full assessment and evaluation of all wounds.      Additionally, patient presents to discuss the results of her recent culture.     Additionally, patient presents with complaints of long, thick toenails and callus both feet.  Gradual onset, worsening over past several weeks, aggravated by increased weight bearing, shoe gear, pressure.  Periodic debridement helps symptoms.     With patient's verbal consent, nails were aggressively reduced and debrided x 8  (right foot nails 1-5, left foot nails 2,3,and 5 ) to their soft tissue attachment mechanically and with nail nipper and electric , removing all offending nail and debris. Patient tolerated procedure well. Patient relates relief following the procedure. No anesthesia or hemostasis required. No blood loss.             Reviewed all notes from patients medical chart from last several months, including imaging, procedures, studies, progress notes,  cultures, antibiotic regimens, photos,  etc.      1. Debridement of left plantar foot diabetic foot ulcer. See Wound Docs for assessment of wounds and procedure notes  2. Continue taking all medications as prescribed  3. Dressing changes, see Wound docs for dressing change orders  4. RTC one week   5. Counseled patient on increasing protein intake, not getting wound wet, keeping dressing clean  dry and intact, following a healthy diet, elevating legs when able, removing pressure from wound     Total time spent for E&M 40  Total time for debridement 35 minutes         Culture taken of wound.  I will adjust antibiotics accordingly once the results of the culture return        Proper ulcer care and the possible need for serial debridements, topical medications, specific dressings and biological engineered skin substitutes if indicated.        Counseling/Education:  I provided patient education verbally regarding:   The aspects of diabetes and how it pertains to the feet. I explained the importance of proper diabetic foot care and how it is essential for the health of their feet.     I discussed the importance of knowing their Hemoglobin A1c and that the level needs to be as close to 6 as possible. I discussed the increase complications of high blood sugar including stroke, blindness, heart attack, kidney failure and loss of limb secondary to neuropathy and PVD.      With neuropathy, beware of any breaks in the skin or redness. These areas are not recognized early due to the numbness.     I discussed Diabetes, lower back issues, metabolic disorders, systemic causes, chemotherapy, vitamin deficiency, heavy metal exposure, as some of the causes. I also explained that as much as 40% of the time we can not find a cause. I discussed different treatments available to control the symptoms but which may not cure the problem.         Counseled patient on the aspects of diabetes and how it pertains to the feet.  I explained the importance of proper diabetic foot care and how it is essential for the health of their feet.        Shoe inspection. Patient instructed on proper foot hygeine. We discussed wearing proper shoe gear, daily foot inspections, never walking without protective shoe gear, never putting sharp instruments to feet, routine podiatric nail visits every 2-3 months.          Patient should call the office  immediately if any signs of infection, such as fever, chills, sweats, increased redness or pain.     Patient was instructed to call the clinic or go to the emergency department if their symptoms do not improve, worsens, or if new symptoms develop.  Patient was advised that if any increased swelling, pain, or numbness arise to go immediately to the ED. Patient knows to call any time if an emergency arises. Shared decision making occurred and patient verbalized understanding in agreement with this plan.         >50% of this > 45 minute visit was spent face to face educating/counseling the patient     I spent a total of 45 minutes on the day of the visit.This includes face to face time and non-face to face time preparing to see the patient (eg, review of tests), obtaining and/or reviewing separately obtained history, documenting clinical information in the electronic or other health record, independently interpreting results and communicating results to the patient/family/caregiver, or care coordinator.        Much of the documentation for this visit was completed in the Wound Docs system.  Please see the attached documentation for further details about the patient's care. Scanned under the Media tab.         Alivia Bruno DPM     Vitals:    08/08/23 0843   BP: (!) 147/86   Pulse: 77   Resp: 19   Temp: 98.3 °F (36.8 °C)   PainSc: 0-No pain      Shoe Size:     Past Surgical History:   Procedure Laterality Date    ANGIOGRAPHY OF LOWER EXTREMITY Left 10/18/2022    Procedure: Angiogram Extremity Unilateral;  Surgeon: Ali Khoobehi, MD;  Location: [facility] CATH/EP LAB;  Service: Vascular;  Laterality: Left;    AV FISTULA PLACEMENT Left 8/29/2022    Procedure: CREATION, AV FISTULA;  Surgeon: Ali Khoobehi, MD;  Location: [facility] OR;  Service: Vascular;  Laterality: Left;    BONE BIOPSY Left 8/24/2022    Procedure: Biopsy-Bone;  Surgeon: Porfirio Ponce DPM;  Location: [facility] OR;  Service: Podiatry;  Laterality: Left;     COLONOSCOPY N/A 10/5/2016    Procedure: COLONOSCOPY;  Surgeon: Pillo Chanel MD;  Location: [facility] ENDO;  Service: Endoscopy;  Laterality: N/A;    COLONOSCOPY N/A 4/26/2022    Procedure: COLONOSCOPY;  Surgeon: Saravanan Rachel MD;  Location: [facility] ENDO;  Service: Endoscopy;  Laterality: N/A;    FISTULOGRAM Left 8/24/2022    Procedure: Fistulogram;  Surgeon: Ali Khoobehi, MD;  Location: [facility] CATH/EP LAB;  Service: Vascular;  Laterality: Left;    HERNIA REPAIR Bilateral 11/22/2016    inguinal    HYSTERECTOMY      INCISION AND DRAINAGE FOOT Left 5/13/2022    Procedure: INCISION AND DRAINAGE, FOOT;  Surgeon: Ricardo Bruno DPM;  Location: [facility] OR;  Service: Podiatry;  Laterality: Left;    OOPHORECTOMY      THROMBECTOMY, AV FISTULA, UPPER EXTREMITY, PERCUTANEOUS  8/24/2022    Procedure: THROMBECTOMY, AV FISTULA, UPPER EXTREMITY, PERCUTANEOUS;  Surgeon: Ali Khoobehi, MD;  Location: [facility] CATH/EP LAB;  Service: Vascular;;    TOE AMPUTATION Left 8/26/2022    Procedure: AMPUTATION, TOE;  Surgeon: Porfirio Ponce DPM;  Location: [facility] OR;  Service: Podiatry;  Laterality: Left;     Past Medical History:   Diagnosis Date    A-fib     resolved per patient    Anemia due to end stage renal disease 6/30/2022    Arthritis     Bronchitis     Cardiovascular event risk, ASCVD 10-year risk 6.7% 10/15/2016    Diabetes mellitus type II     Diabetic foot infection     Diabetic ulcer of left midfoot 05/05/2022    Disorder of kidney and ureter     Encounter for blood transfusion     ESRD (end stage renal disease) 05/18/2018    MANJINDER (generalized anxiety disorder) 10/25/2016    History of colon polyps 11/02/2016    Hyperlipidemia     Hypertension     Stroke      Family History   Problem Relation Age of Onset    Hyperlipidemia Mother     Hypertension Mother     Hypertension Father     Diabetes Father     Diabetes Sister     Diabetes Sister     Diabetes Maternal Aunt     Diabetes Maternal Grandmother     Heart disease Maternal Grandmother      Cancer Paternal Grandmother     Breast cancer Neg Hx     Colon cancer Neg Hx     Ovarian cancer Neg Hx         Social History:   Marital Status:   Alcohol History:  reports no history of alcohol use.  Tobacco History:  reports that she has never smoked. She has never used smokeless tobacco.  Drug History:  reports no history of drug use.    Review of patient's allergies indicates:   Allergen Reactions    Dilaudid [hydromorphone] Nausea And Vomiting    Chlorhexidine Itching    Lortab [hydrocodone-acetaminophen] Itching       Current Outpatient Medications   Medication Sig Dispense Refill    acetaminophen (TYLENOL) 325 MG tablet Take 325 mg by mouth every 6 (six) hours.      ALPRAZolam (XANAX) 0.5 MG tablet Take 1 tablet by mouth.      atorvastatin (LIPITOR) 80 MG tablet Take 1 tablet (80 mg total) by mouth once daily. 90 tablet 3    azelastine (ASTELIN) 137 mcg (0.1 %) nasal spray 1 spray (137 mcg total) by Nasal route 2 (two) times daily. 30 mL 3    blood sugar diagnostic Strp To check BG 3 times daily, to use with insurance preferred meter 100 strip 1    blood sugar diagnostic Strp To check BG 4 times daily, to use with insurance preferred meter 450 strip 3    blood sugar diagnostic Strp To check BG 1 times daily, to use with insurance preferred meter 100 each 11    blood-glucose meter kit To check BG 3 times daily, to use with insurance preferred meter 1 each 0    blood-glucose meter kit To check BG 3 times daily, to use with insurance preferred meter 1 each 0    cetirizine (ZYRTEC) 10 MG tablet Take 1 tablet (10 mg total) by mouth every other day. 45 tablet 3    clopidogreL (PLAVIX) 75 mg tablet Take 1 tablet (75 mg total) by mouth once daily. 90 tablet 3    doxycycline (MONODOX) 100 MG capsule Take 1 capsule (100 mg total) by mouth 2 (two) times daily. 20 capsule 0    doxycycline (VIBRAMYCIN) 100 MG Cap Take 1 capsule (100 mg total) by mouth 2 (two) times daily. for 14 days 28 capsule 0    epoetin  "chris-epbx (RETACRIT) 4,000 unit/mL injection Inject 1.11 mLs (4,440 Units total) into the skin every Mon, Wed, Fri.      EScitalopram oxalate (LEXAPRO) 10 MG tablet Take 1 tablet (10 mg total) by mouth once daily. 30 tablet 11    fluticasone propionate (FLONASE) 50 mcg/actuation nasal spray 2 sprays (100 mcg total) by Each Nostril route once daily. 16 g 3    gabapentin (NEURONTIN) 100 MG capsule Take 1 capsule (100 mg total) by mouth 2 (two) times daily. 180 capsule 3    hydrALAZINE (APRESOLINE) 25 MG tablet Take 1 tablet (25 mg total) by mouth 2 (two) times daily as needed (Systolic blood pressure > 170 mm Hg).      lancets Misc To check BG 3 times daily, to use with insurance preferred meter 100 each 1    lancets Misc To check BG 1 times daily, to use with insurance preferred meter 100 each 11    levoFLOXacin (LEVAQUIN) 500 MG tablet Take 1 tablet (500 mg total) by mouth once daily. for 14 days 14 tablet 0    levoFLOXacin (LEVAQUIN) 500 MG tablet Take 1 tablet (500 mg total) by mouth once daily. for 14 days 14 tablet 0    minoxidiL (LONITEN) 10 MG Tab Take 10 mg by mouth 2 (two) times daily.      multivitamin (THERAGRAN) per tablet Take 1 tablet by mouth.      pen needle, diabetic (BD ULTRA-FINE ROBERT PEN NEEDLE) 32 gauge x 5/32" Ndle 1 pen by Misc.(Non-Drug; Combo Route) route 4 (four) times daily. To use 4 times per day with insulin injections. 100 each 1    semaglutide (OZEMPIC) 0.25 mg or 0.5 mg (2 mg/3 mL) pen injector Inject 0.25 mg into the skin every 7 days. 1.5 mL 2    sucroferric oxyhydroxide (VELPHORO) 500 mg Chew Take 3 tablets by mouth.       No current facility-administered medications for this visit.       Review of Systems   Constitutional:  Negative for chills, fatigue, fever and unexpected weight change.   HENT:  Negative for hearing loss and trouble swallowing.    Eyes:  Negative for photophobia and visual disturbance.   Respiratory:  Negative for cough, shortness of breath and wheezing.  "   Cardiovascular:  Negative for chest pain, palpitations and leg swelling.   Gastrointestinal:  Negative for abdominal pain and nausea.   Genitourinary:  Negative for dysuria and frequency.   Musculoskeletal:  Positive for gait problem. Negative for arthralgias, back pain, joint swelling and myalgias.   Skin:  Positive for wound. Negative for rash.   Neurological:  Positive for numbness. Negative for tremors, seizures, weakness and headaches.   Hematological:  Does not bruise/bleed easily.         Objective:        Physical Exam:   Foot Exam  Physical Exam  Ortho/SPM Exam     Imaging:            Assessment:       1. Type 2 diabetes mellitus with chronic kidney disease on chronic dialysis, with long-term current use of insulin    2. At high risk for inadequate nutritional intake    3. History of amputation of left foot    4. Ulcer of left foot with fat layer exposed    5. History of foot ulcer    6. 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 Ulcer of foot, unspecified laterality, unspecified ulcer stage    8. Difficulty in walking, not elsewhere classified    9. Type 2 diabetes mellitus with hypoglycemia and coma, with long-term current use of insulin    10. Peripheral vascular disease    11. ESRD (end stage renal disease)    12. History of TIA (transient ischemic attack)    13. Diabetic foot infection    14. Decreased pedal pulses    15. At risk for readmission to hospital    16. Hypertension associated with diabetes    17. Cellulitis and abscess of foot    18. Onychogryphosis    19. Diabetic ulcer of left midfoot associated with type 2 diabetes mellitus, with necrosis of muscle    20. Bilateral lower extremity edema      Plan:   Type 2 diabetes mellitus with chronic kidney disease on chronic dialysis, with long-term current use of insulin    At high risk for inadequate nutritional intake    History of amputation of left foot    Ulcer of left foot with fat layer exposed    History of foot  ulcer    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    Ulcer of foot, unspecified laterality, unspecified ulcer stage    Difficulty in walking, not elsewhere classified    Type 2 diabetes mellitus with hypoglycemia and coma, with long-term current use of insulin    Peripheral vascular disease    ESRD (end stage renal disease)    History of TIA (transient ischemic attack)    Diabetic foot infection    Decreased pedal pulses    At risk for readmission to hospital    Hypertension associated with diabetes    Cellulitis and abscess of foot    Onychogryphosis    Diabetic ulcer of left midfoot associated with type 2 diabetes mellitus, with necrosis of muscle    Bilateral lower extremity edema      Follow up in about 1 week (around 8/15/2023).    Procedures          Counseling:     I provided patient education verbally regarding:   Patient diagnosis, treatment options, as well as alternatives, risks, and benefits.     This note was created using Dragon voice recognition software that occasionally misinterpreted phrases or words.

## 2023-08-10 ENCOUNTER — PATIENT MESSAGE (OUTPATIENT)
Dept: ADMINISTRATIVE | Facility: HOSPITAL | Age: 58
End: 2023-08-10
Payer: MEDICARE

## 2023-08-15 ENCOUNTER — OFFICE VISIT (OUTPATIENT)
Dept: WOUND CARE | Facility: HOSPITAL | Age: 58
End: 2023-08-15
Attending: PODIATRIST
Payer: MEDICARE

## 2023-08-15 VITALS
DIASTOLIC BLOOD PRESSURE: 68 MMHG | RESPIRATION RATE: 19 BRPM | TEMPERATURE: 98 F | HEART RATE: 91 BPM | SYSTOLIC BLOOD PRESSURE: 90 MMHG

## 2023-08-15 DIAGNOSIS — Z79.4 TYPE 2 DIABETES MELLITUS WITH CHRONIC KIDNEY DISEASE ON CHRONIC DIALYSIS, WITH LONG-TERM CURRENT USE OF INSULIN: Primary | ICD-10-CM

## 2023-08-15 DIAGNOSIS — E11.59 HYPERTENSION ASSOCIATED WITH DIABETES: ICD-10-CM

## 2023-08-15 DIAGNOSIS — E11.628 DIABETIC FOOT INFECTION: ICD-10-CM

## 2023-08-15 DIAGNOSIS — Z89.432 HISTORY OF AMPUTATION OF LEFT FOOT: ICD-10-CM

## 2023-08-15 DIAGNOSIS — L97.509 ULCER OF FOOT, UNSPECIFIED LATERALITY, UNSPECIFIED ULCER STAGE: ICD-10-CM

## 2023-08-15 DIAGNOSIS — L97.522 ULCER OF LEFT FOOT WITH FAT LAYER EXPOSED: ICD-10-CM

## 2023-08-15 DIAGNOSIS — Z86.73 HISTORY OF TIA (TRANSIENT ISCHEMIC ATTACK): ICD-10-CM

## 2023-08-15 DIAGNOSIS — I15.2 HYPERTENSION ASSOCIATED WITH DIABETES: ICD-10-CM

## 2023-08-15 DIAGNOSIS — Z91.89 AT RISK FOR READMISSION TO HOSPITAL: ICD-10-CM

## 2023-08-15 DIAGNOSIS — R09.89 DECREASED PEDAL PULSES: ICD-10-CM

## 2023-08-15 DIAGNOSIS — E11.22 TYPE 2 DIABETES MELLITUS WITH CHRONIC KIDNEY DISEASE ON CHRONIC DIALYSIS, WITH LONG-TERM CURRENT USE OF INSULIN: Primary | ICD-10-CM

## 2023-08-15 DIAGNOSIS — L08.9 DIABETIC FOOT INFECTION: ICD-10-CM

## 2023-08-15 DIAGNOSIS — R26.2 DIFFICULTY IN WALKING, NOT ELSEWHERE CLASSIFIED: ICD-10-CM

## 2023-08-15 DIAGNOSIS — Z91.89 AT HIGH RISK FOR INADEQUATE NUTRITIONAL INTAKE: ICD-10-CM

## 2023-08-15 DIAGNOSIS — E11.649 TYPE 2 DIABETES MELLITUS WITH HYPOGLYCEMIA UNAWARENESS: ICD-10-CM

## 2023-08-15 DIAGNOSIS — E11.9 COMPREHENSIVE DIABETIC FOOT EXAMINATION, TYPE 2 DM, ENCOUNTER FOR: ICD-10-CM

## 2023-08-15 DIAGNOSIS — L60.2 ONYCHOGRYPHOSIS: ICD-10-CM

## 2023-08-15 DIAGNOSIS — N18.6 ESRD (END STAGE RENAL DISEASE): ICD-10-CM

## 2023-08-15 DIAGNOSIS — R60.0 BILATERAL LOWER EXTREMITY EDEMA: ICD-10-CM

## 2023-08-15 DIAGNOSIS — I73.9 PERIPHERAL VASCULAR DISEASE: ICD-10-CM

## 2023-08-15 DIAGNOSIS — Z79.4 TYPE 2 DIABETES MELLITUS WITH HYPOGLYCEMIA AND COMA, WITH LONG-TERM CURRENT USE OF INSULIN: ICD-10-CM

## 2023-08-15 DIAGNOSIS — E11.641 TYPE 2 DIABETES MELLITUS WITH HYPOGLYCEMIA AND COMA, WITH LONG-TERM CURRENT USE OF INSULIN: ICD-10-CM

## 2023-08-15 DIAGNOSIS — N18.6 TYPE 2 DIABETES MELLITUS WITH CHRONIC KIDNEY DISEASE ON CHRONIC DIALYSIS, WITH LONG-TERM CURRENT USE OF INSULIN: Primary | ICD-10-CM

## 2023-08-15 DIAGNOSIS — E11.9 ENCOUNTER FOR DIABETIC FOOT EXAM: ICD-10-CM

## 2023-08-15 DIAGNOSIS — Z99.2 TYPE 2 DIABETES MELLITUS WITH CHRONIC KIDNEY DISEASE ON CHRONIC DIALYSIS, WITH LONG-TERM CURRENT USE OF INSULIN: Primary | ICD-10-CM

## 2023-08-15 DIAGNOSIS — Z87.2 HISTORY OF FOOT ULCER: ICD-10-CM

## 2023-08-15 PROCEDURE — 1160F RVW MEDS BY RX/DR IN RCRD: CPT | Mod: CPTII,,, | Performed by: PODIATRIST

## 2023-08-15 PROCEDURE — 3074F PR MOST RECENT SYSTOLIC BLOOD PRESSURE < 130 MM HG: ICD-10-PCS | Mod: CPTII,,, | Performed by: PODIATRIST

## 2023-08-15 PROCEDURE — 4010F PR ACE/ARB THEARPY RXD/TAKEN: ICD-10-PCS | Mod: CPTII,,, | Performed by: PODIATRIST

## 2023-08-15 PROCEDURE — 3078F DIAST BP <80 MM HG: CPT | Mod: CPTII,,, | Performed by: PODIATRIST

## 2023-08-15 PROCEDURE — 11042 DBRDMT SUBQ TIS 1ST 20SQCM/<: CPT

## 2023-08-15 PROCEDURE — 1159F MED LIST DOCD IN RCRD: CPT | Mod: CPTII,,, | Performed by: PODIATRIST

## 2023-08-15 PROCEDURE — 1160F PR REVIEW ALL MEDS BY PRESCRIBER/CLIN PHARMACIST DOCUMENTED: ICD-10-PCS | Mod: CPTII,,, | Performed by: PODIATRIST

## 2023-08-15 PROCEDURE — 4010F ACE/ARB THERAPY RXD/TAKEN: CPT | Mod: CPTII,,, | Performed by: PODIATRIST

## 2023-08-15 PROCEDURE — 3052F PR MOST RECENT HEMOGLOBIN A1C LEVEL 8.0 - < 9.0%: ICD-10-PCS | Mod: CPTII,,, | Performed by: PODIATRIST

## 2023-08-15 PROCEDURE — 11042 DBRDMT SUBQ TIS 1ST 20SQCM/<: CPT | Mod: ,,, | Performed by: PODIATRIST

## 2023-08-15 PROCEDURE — 99214 OFFICE O/P EST MOD 30 MIN: CPT | Mod: 25,,, | Performed by: PODIATRIST

## 2023-08-15 PROCEDURE — 3052F HG A1C>EQUAL 8.0%<EQUAL 9.0%: CPT | Mod: CPTII,,, | Performed by: PODIATRIST

## 2023-08-15 PROCEDURE — 11042 PR DEBRIDEMENT, SKIN, SUB-Q TISSUE,=<20 SQ CM: ICD-10-PCS | Mod: ,,, | Performed by: PODIATRIST

## 2023-08-15 PROCEDURE — 3078F PR MOST RECENT DIASTOLIC BLOOD PRESSURE < 80 MM HG: ICD-10-PCS | Mod: CPTII,,, | Performed by: PODIATRIST

## 2023-08-15 PROCEDURE — 3074F SYST BP LT 130 MM HG: CPT | Mod: CPTII,,, | Performed by: PODIATRIST

## 2023-08-15 PROCEDURE — 1159F PR MEDICATION LIST DOCUMENTED IN MEDICAL RECORD: ICD-10-PCS | Mod: CPTII,,, | Performed by: PODIATRIST

## 2023-08-15 PROCEDURE — 99214 PR OFFICE/OUTPT VISIT, EST, LEVL IV, 30-39 MIN: ICD-10-PCS | Mod: 25,,, | Performed by: PODIATRIST

## 2023-08-16 NOTE — PROGRESS NOTES
[address] [address]  [place] [address]  Phone: [phone]   [phone]    Patient's PCP:Stefano Lopez MD  Referring Provider: Aaareferral Self    Subjective:      Chief Complaint:: Diabetic Foot Ulcer, Non-healing Wound Follow Up, Wound Care, Diabetes, Wound Check, Wound Infection, Pressure Ulcer, Peripheral Neuropathy, Numbness, Non-healing Wound, Difficulty Walking, Foot Ulcer, and Diabetic Foot Exam    HPI  Leanna García is a 57 y.o. female who presents today with reopened left plantar foot diabetic foot ulcer.  See wound docs documentation for full assessment and evaluation of all wounds.       Additionally, patient presents to clinic today for evaluation and treatment of diabetic feet.         Reviewed all notes from patients medical chart from last several months, including imaging, procedures, studies, progress notes,  cultures, antibiotic regimens, photos,  etc.      1. Debridement of left plantar foot diabetic foot ulcer. See Wound Docs for assessment of wounds and procedure notes  2. Continue taking all medications as prescribed  3. Dressing changes, see Wound docs for dressing change orders  4. RTC one week   5. Counseled patient on increasing protein intake, not getting wound wet, keeping dressing clean dry and intact, following a healthy diet, elevating legs when able, removing pressure from wound     Total time spent for E&M 40  Total time for debridement 35 minutes         Culture taken of wound.  I will adjust antibiotics accordingly once the results of the culture return        Proper ulcer care and the possible need for serial debridements, topical medications, specific dressings and biological engineered skin substitutes if indicated.        Counseling/Education:  I provided patient education verbally regarding:   The aspects of diabetes and how it pertains to the feet. I explained the importance of proper diabetic foot care and how it is essential for the health of  their feet.     I discussed the importance of knowing their Hemoglobin A1c and that the level needs to be as close to 6 as possible. I discussed the increase complications of high blood sugar including stroke, blindness, heart attack, kidney failure and loss of limb secondary to neuropathy and PVD.      With neuropathy, beware of any breaks in the skin or redness. These areas are not recognized early due to the numbness.     I discussed Diabetes, lower back issues, metabolic disorders, systemic causes, chemotherapy, vitamin deficiency, heavy metal exposure, as some of the causes. I also explained that as much as 40% of the time we can not find a cause. I discussed different treatments available to control the symptoms but which may not cure the problem.         Counseled patient on the aspects of diabetes and how it pertains to the feet.  I explained the importance of proper diabetic foot care and how it is essential for the health of their feet.        Shoe inspection. Patient instructed on proper foot hygeine. We discussed wearing proper shoe gear, daily foot inspections, never walking without protective shoe gear, never putting sharp instruments to feet, routine podiatric nail visits every 2-3 months.          Patient should call the office immediately if any signs of infection, such as fever, chills, sweats, increased redness or pain.     Patient was instructed to call the clinic or go to the emergency department if their symptoms do not improve, worsens, or if new symptoms develop.  Patient was advised that if any increased swelling, pain, or numbness arise to go immediately to the ED. Patient knows to call any time if an emergency arises. Shared decision making occurred and patient verbalized understanding in agreement with this plan.         >50% of this > 45 minute visit was spent face to face educating/counseling the patient     I spent a total of 45 minutes on the day of the visit.This includes face to  face time and non-face to face time preparing to see the patient (eg, review of tests), obtaining and/or reviewing separately obtained history, documenting clinical information in the electronic or other health record, independently interpreting results and communicating results to the patient/family/caregiver, or care coordinator.        Much of the documentation for this visit was completed in the Wound Docs system.  Please see the attached documentation for further details about the patient's care. Scanned under the Media tab.         Alivia Bruno DPM     Vitals:    08/15/23 1451   BP: 90/68   Pulse: 91   Resp: 19   Temp: 98.4 °F (36.9 °C)   PainSc: 0-No pain      Shoe Size:     Past Surgical History:   Procedure Laterality Date    ANGIOGRAPHY OF LOWER EXTREMITY Left 10/18/2022    Procedure: Angiogram Extremity Unilateral;  Surgeon: Ali Khoobehi, MD;  Location: [facility] CATH/EP LAB;  Service: Vascular;  Laterality: Left;    AV FISTULA PLACEMENT Left 8/29/2022    Procedure: CREATION, AV FISTULA;  Surgeon: Ali Khoobehi, MD;  Location: [facility] OR;  Service: Vascular;  Laterality: Left;    BONE BIOPSY Left 8/24/2022    Procedure: Biopsy-Bone;  Surgeon: Porfirio Ponce DPM;  Location: [facility] OR;  Service: Podiatry;  Laterality: Left;    COLONOSCOPY N/A 10/5/2016    Procedure: COLONOSCOPY;  Surgeon: Pillo Chanel MD;  Location: [facility] ENDO;  Service: Endoscopy;  Laterality: N/A;    COLONOSCOPY N/A 4/26/2022    Procedure: COLONOSCOPY;  Surgeon: Saravanan Rachel MD;  Location: [facility] ENDO;  Service: Endoscopy;  Laterality: N/A;    FISTULOGRAM Left 8/24/2022    Procedure: Fistulogram;  Surgeon: Ali Khoobehi, MD;  Location: [facility] CATH/EP LAB;  Service: Vascular;  Laterality: Left;    HERNIA REPAIR Bilateral 11/22/2016    inguinal    HYSTERECTOMY      INCISION AND DRAINAGE FOOT Left 5/13/2022    Procedure: INCISION AND DRAINAGE, FOOT;  Surgeon: Ricardo Bruno DPM;  Location: [facility] OR;  Service: Podiatry;  Laterality: Left;     OOPHORECTOMY      THROMBECTOMY, AV FISTULA, UPPER EXTREMITY, PERCUTANEOUS  8/24/2022    Procedure: THROMBECTOMY, AV FISTULA, UPPER EXTREMITY, PERCUTANEOUS;  Surgeon: Ali Khoobehi, MD;  Location: [facility] CATH/EP LAB;  Service: Vascular;;    TOE AMPUTATION Left 8/26/2022    Procedure: AMPUTATION, TOE;  Surgeon: Porfirio Ponce DPM;  Location: [facility] OR;  Service: Podiatry;  Laterality: Left;     Past Medical History:   Diagnosis Date    A-fib     resolved per patient    Anemia due to end stage renal disease 6/30/2022    Arthritis     Bronchitis     Cardiovascular event risk, ASCVD 10-year risk 6.7% 10/15/2016    Diabetes mellitus type II     Diabetic foot infection     Diabetic ulcer of left midfoot 05/05/2022    Disorder of kidney and ureter     Encounter for blood transfusion     ESRD (end stage renal disease) 05/18/2018    MANJINDER (generalized anxiety disorder) 10/25/2016    History of colon polyps 11/02/2016    Hyperlipidemia     Hypertension     Stroke      Family History   Problem Relation Age of Onset    Hyperlipidemia Mother     Hypertension Mother     Hypertension Father     Diabetes Father     Diabetes Sister     Diabetes Sister     Diabetes Maternal Aunt     Diabetes Maternal Grandmother     Heart disease Maternal Grandmother     Cancer Paternal Grandmother     Breast cancer Neg Hx     Colon cancer Neg Hx     Ovarian cancer Neg Hx         Social History:   Marital Status:   Alcohol History:  reports no history of alcohol use.  Tobacco History:  reports that she has never smoked. She has never used smokeless tobacco.  Drug History:  reports no history of drug use.    Review of patient's allergies indicates:   Allergen Reactions    Dilaudid [hydromorphone] Nausea And Vomiting    Chlorhexidine Itching    Lortab [hydrocodone-acetaminophen] Itching       Current Outpatient Medications   Medication Sig Dispense Refill    acetaminophen (TYLENOL) 325 MG tablet Take 325 mg by mouth every 6 (six) hours.       ALPRAZolam (XANAX) 0.5 MG tablet Take 1 tablet by mouth.      atorvastatin (LIPITOR) 80 MG tablet Take 1 tablet (80 mg total) by mouth once daily. 90 tablet 3    azelastine (ASTELIN) 137 mcg (0.1 %) nasal spray 1 spray (137 mcg total) by Nasal route 2 (two) times daily. 30 mL 3    blood sugar diagnostic Strp To check BG 3 times daily, to use with insurance preferred meter 100 strip 1    blood sugar diagnostic Strp To check BG 4 times daily, to use with insurance preferred meter 450 strip 3    blood sugar diagnostic Strp To check BG 1 times daily, to use with insurance preferred meter 100 each 11    blood-glucose meter kit To check BG 3 times daily, to use with insurance preferred meter 1 each 0    blood-glucose meter kit To check BG 3 times daily, to use with insurance preferred meter 1 each 0    cetirizine (ZYRTEC) 10 MG tablet Take 1 tablet (10 mg total) by mouth every other day. 45 tablet 3    clopidogreL (PLAVIX) 75 mg tablet Take 1 tablet (75 mg total) by mouth once daily. 90 tablet 3    doxycycline (MONODOX) 100 MG capsule Take 1 capsule (100 mg total) by mouth 2 (two) times daily. 20 capsule 0    doxycycline (VIBRAMYCIN) 100 MG Cap Take 1 capsule (100 mg total) by mouth 2 (two) times daily. for 14 days 28 capsule 0    epoetin chris-epbx (RETACRIT) 4,000 unit/mL injection Inject 1.11 mLs (4,440 Units total) into the skin every Mon, Wed, Fri.      EScitalopram oxalate (LEXAPRO) 10 MG tablet Take 1 tablet (10 mg total) by mouth once daily. 30 tablet 11    fluticasone propionate (FLONASE) 50 mcg/actuation nasal spray 2 sprays (100 mcg total) by Each Nostril route once daily. 16 g 3    gabapentin (NEURONTIN) 100 MG capsule Take 1 capsule (100 mg total) by mouth 2 (two) times daily. 180 capsule 3    hydrALAZINE (APRESOLINE) 25 MG tablet Take 1 tablet (25 mg total) by mouth 2 (two) times daily as needed (Systolic blood pressure > 170 mm Hg).      lancets Misc To check BG 3 times daily, to use with insurance preferred  "meter 100 each 1    lancets Misc To check BG 1 times daily, to use with insurance preferred meter 100 each 11    levoFLOXacin (LEVAQUIN) 500 MG tablet Take 1 tablet (500 mg total) by mouth once daily. for 14 days 14 tablet 0    levoFLOXacin (LEVAQUIN) 500 MG tablet Take 1 tablet (500 mg total) by mouth once daily. for 14 days 14 tablet 0    minoxidiL (LONITEN) 10 MG Tab Take 10 mg by mouth 2 (two) times daily.      multivitamin (THERAGRAN) per tablet Take 1 tablet by mouth.      pen needle, diabetic (BD ULTRA-FINE ROBERT PEN NEEDLE) 32 gauge x 5/32" Ndle 1 pen by Misc.(Non-Drug; Combo Route) route 4 (four) times daily. To use 4 times per day with insulin injections. 100 each 1    semaglutide (OZEMPIC) 0.25 mg or 0.5 mg (2 mg/3 mL) pen injector Inject 0.25 mg into the skin every 7 days. 1.5 mL 2    sucroferric oxyhydroxide (VELPHORO) 500 mg Chew Take 3 tablets by mouth.       No current facility-administered medications for this visit.       Review of Systems   Constitutional:  Negative for chills, fatigue, fever and unexpected weight change.   HENT:  Negative for hearing loss and trouble swallowing.    Eyes:  Negative for photophobia and visual disturbance.   Respiratory:  Negative for cough, shortness of breath and wheezing.    Cardiovascular:  Negative for chest pain, palpitations and leg swelling.   Gastrointestinal:  Negative for abdominal pain and nausea.   Genitourinary:  Negative for dysuria and frequency.   Musculoskeletal:  Positive for gait problem. Negative for arthralgias, back pain, joint swelling and myalgias.   Skin:  Positive for wound. Negative for rash.   Neurological:  Positive for numbness. Negative for tremors, seizures, weakness and headaches.   Hematological:  Does not bruise/bleed easily.         Objective:        Physical Exam:   Foot Exam  Physical Exam  Ortho/SPM Exam     Imaging:            Assessment:       1. Type 2 diabetes mellitus with chronic kidney disease on chronic dialysis, with " long-term current use of insulin    2. History of amputation of left foot    3. 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 Ulcer of left foot with fat layer exposed    5. History of foot ulcer    6. At high risk for inadequate nutritional intake    7. Difficulty in walking, not elsewhere classified    8. Type 2 diabetes mellitus with hypoglycemia and coma, with long-term current use of insulin    9. Ulcer of foot, unspecified laterality, unspecified ulcer stage    10. Peripheral vascular disease    11. ESRD (end stage renal disease)    12. History of TIA (transient ischemic attack)    13. Diabetic foot infection    14. Decreased pedal pulses    15. At risk for readmission to hospital    16. Hypertension associated with diabetes    17. Bilateral lower extremity edema    18. Onychogryphosis    19. Comprehensive diabetic foot examination, type 2 DM, encounter for    20. Encounter for diabetic foot exam      Plan:   Type 2 diabetes mellitus with chronic kidney disease on chronic dialysis, with long-term current use of insulin    History of amputation of left foot    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    Ulcer of left foot with fat layer exposed    History of foot ulcer    At high risk for inadequate nutritional intake    Difficulty in walking, not elsewhere classified    Type 2 diabetes mellitus with hypoglycemia and coma, with long-term current use of insulin  -      DIABETES FOOT EXAM    Ulcer of foot, unspecified laterality, unspecified ulcer stage    Peripheral vascular disease    ESRD (end stage renal disease)    History of TIA (transient ischemic attack)    Diabetic foot infection    Decreased pedal pulses    At risk for readmission to hospital    Hypertension associated with diabetes    Bilateral lower extremity edema    Onychogryphosis    Comprehensive diabetic foot examination, type 2 DM, encounter for  -      DIABETES FOOT EXAM    Encounter for diabetic foot exam  -       DIABETES FOOT EXAM      Follow up in about 6 days (around 8/21/2023).    Procedures          Counseling:     I provided patient education verbally regarding:   Patient diagnosis, treatment options, as well as alternatives, risks, and benefits.     This note was created using Dragon voice recognition software that occasionally misinterpreted phrases or words.

## 2023-08-22 ENCOUNTER — OFFICE VISIT (OUTPATIENT)
Dept: WOUND CARE | Facility: HOSPITAL | Age: 58
End: 2023-08-22
Attending: PODIATRIST
Payer: MEDICARE

## 2023-08-22 VITALS
DIASTOLIC BLOOD PRESSURE: 97 MMHG | RESPIRATION RATE: 16 BRPM | HEART RATE: 77 BPM | SYSTOLIC BLOOD PRESSURE: 130 MMHG | TEMPERATURE: 99 F

## 2023-08-22 DIAGNOSIS — E11.628 DIABETIC FOOT INFECTION: ICD-10-CM

## 2023-08-22 DIAGNOSIS — R09.89 DECREASED PEDAL PULSES: ICD-10-CM

## 2023-08-22 DIAGNOSIS — Z89.432 HISTORY OF AMPUTATION OF LEFT FOOT: ICD-10-CM

## 2023-08-22 DIAGNOSIS — Z79.4 TYPE 2 DIABETES MELLITUS WITH HYPOGLYCEMIA AND COMA, WITH LONG-TERM CURRENT USE OF INSULIN: ICD-10-CM

## 2023-08-22 DIAGNOSIS — N18.6 TYPE 2 DIABETES MELLITUS WITH CHRONIC KIDNEY DISEASE ON CHRONIC DIALYSIS, WITH LONG-TERM CURRENT USE OF INSULIN: ICD-10-CM

## 2023-08-22 DIAGNOSIS — L97.522 ULCER OF LEFT FOOT WITH FAT LAYER EXPOSED: Primary | ICD-10-CM

## 2023-08-22 DIAGNOSIS — E11.59 HYPERTENSION ASSOCIATED WITH DIABETES: ICD-10-CM

## 2023-08-22 DIAGNOSIS — Z99.2 TYPE 2 DIABETES MELLITUS WITH CHRONIC KIDNEY DISEASE ON CHRONIC DIALYSIS, WITH LONG-TERM CURRENT USE OF INSULIN: ICD-10-CM

## 2023-08-22 DIAGNOSIS — R26.2 DIFFICULTY IN WALKING, NOT ELSEWHERE CLASSIFIED: ICD-10-CM

## 2023-08-22 DIAGNOSIS — Z91.89 AT RISK FOR READMISSION TO HOSPITAL: ICD-10-CM

## 2023-08-22 DIAGNOSIS — E11.649 TYPE 2 DIABETES MELLITUS WITH HYPOGLYCEMIA UNAWARENESS: ICD-10-CM

## 2023-08-22 DIAGNOSIS — I15.2 HYPERTENSION ASSOCIATED WITH DIABETES: ICD-10-CM

## 2023-08-22 DIAGNOSIS — Z79.4 TYPE 2 DIABETES MELLITUS WITH CHRONIC KIDNEY DISEASE ON CHRONIC DIALYSIS, WITH LONG-TERM CURRENT USE OF INSULIN: ICD-10-CM

## 2023-08-22 DIAGNOSIS — N18.6 ESRD (END STAGE RENAL DISEASE): ICD-10-CM

## 2023-08-22 DIAGNOSIS — E11.22 TYPE 2 DIABETES MELLITUS WITH CHRONIC KIDNEY DISEASE ON CHRONIC DIALYSIS, WITH LONG-TERM CURRENT USE OF INSULIN: ICD-10-CM

## 2023-08-22 DIAGNOSIS — L60.2 ONYCHOGRYPHOSIS: ICD-10-CM

## 2023-08-22 DIAGNOSIS — I73.9 PERIPHERAL VASCULAR DISEASE: ICD-10-CM

## 2023-08-22 DIAGNOSIS — L97.509 ULCER OF FOOT, UNSPECIFIED LATERALITY, UNSPECIFIED ULCER STAGE: ICD-10-CM

## 2023-08-22 DIAGNOSIS — L08.9 DIABETIC FOOT INFECTION: ICD-10-CM

## 2023-08-22 DIAGNOSIS — R60.0 BILATERAL LOWER EXTREMITY EDEMA: ICD-10-CM

## 2023-08-22 DIAGNOSIS — Z86.73 HISTORY OF TIA (TRANSIENT ISCHEMIC ATTACK): ICD-10-CM

## 2023-08-22 DIAGNOSIS — Z91.89 AT HIGH RISK FOR INADEQUATE NUTRITIONAL INTAKE: ICD-10-CM

## 2023-08-22 DIAGNOSIS — Z87.2 HISTORY OF FOOT ULCER: ICD-10-CM

## 2023-08-22 DIAGNOSIS — E11.641 TYPE 2 DIABETES MELLITUS WITH HYPOGLYCEMIA AND COMA, WITH LONG-TERM CURRENT USE OF INSULIN: ICD-10-CM

## 2023-08-22 PROCEDURE — 1159F PR MEDICATION LIST DOCUMENTED IN MEDICAL RECORD: ICD-10-PCS | Mod: CPTII,,, | Performed by: PODIATRIST

## 2023-08-22 PROCEDURE — 3080F PR MOST RECENT DIASTOLIC BLOOD PRESSURE >= 90 MM HG: ICD-10-PCS | Mod: CPTII,,, | Performed by: PODIATRIST

## 2023-08-22 PROCEDURE — 99213 OFFICE O/P EST LOW 20 MIN: CPT | Performed by: PODIATRIST

## 2023-08-22 PROCEDURE — 1159F MED LIST DOCD IN RCRD: CPT | Mod: CPTII,,, | Performed by: PODIATRIST

## 2023-08-22 PROCEDURE — 99214 OFFICE O/P EST MOD 30 MIN: CPT | Mod: ,,, | Performed by: PODIATRIST

## 2023-08-22 PROCEDURE — 3080F DIAST BP >= 90 MM HG: CPT | Mod: CPTII,,, | Performed by: PODIATRIST

## 2023-08-22 PROCEDURE — 4010F PR ACE/ARB THEARPY RXD/TAKEN: ICD-10-PCS | Mod: CPTII,,, | Performed by: PODIATRIST

## 2023-08-22 PROCEDURE — 4010F ACE/ARB THERAPY RXD/TAKEN: CPT | Mod: CPTII,,, | Performed by: PODIATRIST

## 2023-08-22 PROCEDURE — 1160F RVW MEDS BY RX/DR IN RCRD: CPT | Mod: CPTII,,, | Performed by: PODIATRIST

## 2023-08-22 PROCEDURE — 3075F PR MOST RECENT SYSTOLIC BLOOD PRESS GE 130-139MM HG: ICD-10-PCS | Mod: CPTII,,, | Performed by: PODIATRIST

## 2023-08-22 PROCEDURE — 99214 PR OFFICE/OUTPT VISIT, EST, LEVL IV, 30-39 MIN: ICD-10-PCS | Mod: ,,, | Performed by: PODIATRIST

## 2023-08-22 PROCEDURE — 3052F HG A1C>EQUAL 8.0%<EQUAL 9.0%: CPT | Mod: CPTII,,, | Performed by: PODIATRIST

## 2023-08-22 PROCEDURE — 3052F PR MOST RECENT HEMOGLOBIN A1C LEVEL 8.0 - < 9.0%: ICD-10-PCS | Mod: CPTII,,, | Performed by: PODIATRIST

## 2023-08-22 PROCEDURE — 3075F SYST BP GE 130 - 139MM HG: CPT | Mod: CPTII,,, | Performed by: PODIATRIST

## 2023-08-22 PROCEDURE — 1160F PR REVIEW ALL MEDS BY PRESCRIBER/CLIN PHARMACIST DOCUMENTED: ICD-10-PCS | Mod: CPTII,,, | Performed by: PODIATRIST

## 2023-08-23 NOTE — PROGRESS NOTES
[address] [facility] [address]  [place] [address]  Phone: [phone]   [phone]    Patient's PCP:Stefano Lopez MD  Referring Provider: No ref. provider found    Subjective:      Chief Complaint:: Wound Care, Diabetes, Wound Check, Pressure Ulcer, Foot Ulcer, Poor Circulation, Peripheral Neuropathy, Non-healing Wound, Numbness, Difficulty Walking, and Diabetic Foot Ulcer    HPI  Leanna García is a 57 y.o. female who presents today with reopened left plantar foot diabetic foot ulcer.  See wound docs documentation for full assessment and evaluation of all wounds.         Additionally, patient presents to clinic today for evaluation and treatment of diabetic feet.         Reviewed all notes from patients medical chart from last several months, including imaging, procedures, studies, progress notes,  cultures, antibiotic regimens, photos,  etc.      See Wound Docs for assessment of wounds and procedure notes  2. Continue taking all medications as prescribed  3. Dressing changes, see Wound docs for dressing change orders  4. RTC one week   5. Counseled patient on increasing protein intake, not getting wound wet, keeping dressing clean dry and intact, following a healthy diet, elevating legs when able, removing pressure from wound           Proper ulcer care and the possible need for serial debridements, topical medications, specific dressings and biological engineered skin substitutes if indicated.        Counseling/Education:  I provided patient education verbally regarding:   The aspects of diabetes and how it pertains to the feet. I explained the importance of proper diabetic foot care and how it is essential for the health of their feet.     I discussed the importance of knowing their Hemoglobin A1c and that the level needs to be as close to 6 as possible. I discussed the increase complications of high blood sugar including stroke, blindness, heart attack, kidney failure and loss of limb  secondary to neuropathy and PVD.      With neuropathy, beware of any breaks in the skin or redness. These areas are not recognized early due to the numbness.     I discussed Diabetes, lower back issues, metabolic disorders, systemic causes, chemotherapy, vitamin deficiency, heavy metal exposure, as some of the causes. I also explained that as much as 40% of the time we can not find a cause. I discussed different treatments available to control the symptoms but which may not cure the problem.         Counseled patient on the aspects of diabetes and how it pertains to the feet.  I explained the importance of proper diabetic foot care and how it is essential for the health of their feet.        Shoe inspection. Patient instructed on proper foot hygeine. We discussed wearing proper shoe gear, daily foot inspections, never walking without protective shoe gear, never putting sharp instruments to feet, routine podiatric nail visits every 2-3 months.          Patient should call the office immediately if any signs of infection, such as fever, chills, sweats, increased redness or pain.     Patient was instructed to call the clinic or go to the emergency department if their symptoms do not improve, worsens, or if new symptoms develop.  Patient was advised that if any increased swelling, pain, or numbness arise to go immediately to the ED. Patient knows to call any time if an emergency arises. Shared decision making occurred and patient verbalized understanding in agreement with this plan.         >50% of this > 45 minute visit was spent face to face educating/counseling the patient     I spent a total of 45 minutes on the day of the visit.This includes face to face time and non-face to face time preparing to see the patient (eg, review of tests), obtaining and/or reviewing separately obtained history, documenting clinical information in the electronic or other health record, independently interpreting results and  communicating results to the patient/family/caregiver, or care coordinator.        Much of the documentation for this visit was completed in the Wound Docs system.  Please see the attached documentation for further details about the patient's care. Scanned under the Media tab.         Alivia Bruno DPM     Vitals:    08/22/23 0859   BP: (!) 130/97   Pulse: 77   Resp: 16   Temp: 98.6 °F (37 °C)   PainSc: 0-No pain      Shoe Size:     Past Surgical History:   Procedure Laterality Date    ANGIOGRAPHY OF LOWER EXTREMITY Left 10/18/2022    Procedure: Angiogram Extremity Unilateral;  Surgeon: Ali Khoobehi, MD;  Location: [facility] CATH/EP LAB;  Service: Vascular;  Laterality: Left;    AV FISTULA PLACEMENT Left 8/29/2022    Procedure: CREATION, AV FISTULA;  Surgeon: Ali Khoobehi, MD;  Location: [facility] OR;  Service: Vascular;  Laterality: Left;    BONE BIOPSY Left 8/24/2022    Procedure: Biopsy-Bone;  Surgeon: Porfirio Ponce DPM;  Location: [facility] OR;  Service: Podiatry;  Laterality: Left;    COLONOSCOPY N/A 10/5/2016    Procedure: COLONOSCOPY;  Surgeon: Pillo Chanel MD;  Location: [facility] ENDO;  Service: Endoscopy;  Laterality: N/A;    COLONOSCOPY N/A 4/26/2022    Procedure: COLONOSCOPY;  Surgeon: Saravanan Rachel MD;  Location: [facility] ENDO;  Service: Endoscopy;  Laterality: N/A;    FISTULOGRAM Left 8/24/2022    Procedure: Fistulogram;  Surgeon: Ali Khoobehi, MD;  Location: [facility] CATH/EP LAB;  Service: Vascular;  Laterality: Left;    HERNIA REPAIR Bilateral 11/22/2016    inguinal    HYSTERECTOMY      INCISION AND DRAINAGE FOOT Left 5/13/2022    Procedure: INCISION AND DRAINAGE, FOOT;  Surgeon: Ricardo Bruno DPM;  Location: [facility] OR;  Service: Podiatry;  Laterality: Left;    OOPHORECTOMY      THROMBECTOMY, AV FISTULA, UPPER EXTREMITY, PERCUTANEOUS  8/24/2022    Procedure: THROMBECTOMY, AV FISTULA, UPPER EXTREMITY, PERCUTANEOUS;  Surgeon: Ali Khoobehi, MD;  Location: [facility] CATH/EP LAB;  Service: Vascular;;    TOE AMPUTATION Left  8/26/2022    Procedure: AMPUTATION, TOE;  Surgeon: Porfirio Ponce DPM;  Location: [facility];  Service: Podiatry;  Laterality: Left;     Past Medical History:   Diagnosis Date    A-fib     resolved per patient    Anemia due to end stage renal disease 6/30/2022    Arthritis     Bronchitis     Cardiovascular event risk, ASCVD 10-year risk 6.7% 10/15/2016    Diabetes mellitus type II     Diabetic foot infection     Diabetic ulcer of left midfoot 05/05/2022    Disorder of kidney and ureter     Encounter for blood transfusion     ESRD (end stage renal disease) 05/18/2018    MANJINDER (generalized anxiety disorder) 10/25/2016    History of colon polyps 11/02/2016    Hyperlipidemia     Hypertension     Stroke      Family History   Problem Relation Age of Onset    Hyperlipidemia Mother     Hypertension Mother     Hypertension Father     Diabetes Father     Diabetes Sister     Diabetes Sister     Diabetes Maternal Aunt     Diabetes Maternal Grandmother     Heart disease Maternal Grandmother     Cancer Paternal Grandmother     Breast cancer Neg Hx     Colon cancer Neg Hx     Ovarian cancer Neg Hx         Social History:   Marital Status:   Alcohol History:  reports no history of alcohol use.  Tobacco History:  reports that she has never smoked. She has never used smokeless tobacco.  Drug History:  reports no history of drug use.    Review of patient's allergies indicates:   Allergen Reactions    Dilaudid [hydromorphone] Nausea And Vomiting    Chlorhexidine Itching    Lortab [hydrocodone-acetaminophen] Itching       Current Outpatient Medications   Medication Sig Dispense Refill    acetaminophen (TYLENOL) 325 MG tablet Take 325 mg by mouth every 6 (six) hours.      ALPRAZolam (XANAX) 0.5 MG tablet Take 1 tablet by mouth.      atorvastatin (LIPITOR) 80 MG tablet Take 1 tablet (80 mg total) by mouth once daily. 90 tablet 3    azelastine (ASTELIN) 137 mcg (0.1 %) nasal spray 1 spray (137 mcg total) by Nasal route 2 (two) times  "daily. 30 mL 3    blood sugar diagnostic Strp To check BG 3 times daily, to use with insurance preferred meter 100 strip 1    blood sugar diagnostic Strp To check BG 4 times daily, to use with insurance preferred meter 450 strip 3    blood sugar diagnostic Strp To check BG 1 times daily, to use with insurance preferred meter 100 each 11    blood-glucose meter kit To check BG 3 times daily, to use with insurance preferred meter 1 each 0    blood-glucose meter kit To check BG 3 times daily, to use with insurance preferred meter 1 each 0    cetirizine (ZYRTEC) 10 MG tablet Take 1 tablet (10 mg total) by mouth every other day. 45 tablet 3    clopidogreL (PLAVIX) 75 mg tablet Take 1 tablet (75 mg total) by mouth once daily. 90 tablet 3    doxycycline (MONODOX) 100 MG capsule Take 1 capsule (100 mg total) by mouth 2 (two) times daily. 20 capsule 0    epoetin chris-epbx (RETACRIT) 4,000 unit/mL injection Inject 1.11 mLs (4,440 Units total) into the skin every Mon, Wed, Fri.      EScitalopram oxalate (LEXAPRO) 10 MG tablet Take 1 tablet (10 mg total) by mouth once daily. 30 tablet 11    fluticasone propionate (FLONASE) 50 mcg/actuation nasal spray 2 sprays (100 mcg total) by Each Nostril route once daily. 16 g 3    gabapentin (NEURONTIN) 100 MG capsule Take 1 capsule (100 mg total) by mouth 2 (two) times daily. 180 capsule 3    hydrALAZINE (APRESOLINE) 25 MG tablet Take 1 tablet (25 mg total) by mouth 2 (two) times daily as needed (Systolic blood pressure > 170 mm Hg).      lancets Misc To check BG 3 times daily, to use with insurance preferred meter 100 each 1    lancets Misc To check BG 1 times daily, to use with insurance preferred meter 100 each 11    minoxidiL (LONITEN) 10 MG Tab Take 10 mg by mouth 2 (two) times daily.      multivitamin (THERAGRAN) per tablet Take 1 tablet by mouth.      pen needle, diabetic (BD ULTRA-FINE ROBERT PEN NEEDLE) 32 gauge x 5/32" Ndle 1 pen by Misc.(Non-Drug; Combo Route) route 4 (four) times " daily. To use 4 times per day with insulin injections. 100 each 1    semaglutide (OZEMPIC) 0.25 mg or 0.5 mg (2 mg/3 mL) pen injector Inject 0.25 mg into the skin every 7 days. 1.5 mL 2    sucroferric oxyhydroxide (VELPHORO) 500 mg Chew Take 3 tablets by mouth.       No current facility-administered medications for this visit.       Review of Systems   Constitutional:  Negative for chills, fatigue, fever and unexpected weight change.   HENT:  Negative for hearing loss and trouble swallowing.    Eyes:  Negative for photophobia and visual disturbance.   Respiratory:  Negative for cough, shortness of breath and wheezing.    Cardiovascular:  Negative for chest pain, palpitations and leg swelling.   Gastrointestinal:  Negative for abdominal pain and nausea.   Genitourinary:  Negative for dysuria and frequency.   Musculoskeletal:  Positive for gait problem. Negative for arthralgias, back pain, joint swelling and myalgias.   Skin:  Positive for wound. Negative for rash.   Neurological:  Positive for numbness. Negative for tremors, seizures, weakness and headaches.   Hematological:  Does not bruise/bleed easily.         Objective:        Physical Exam:   Foot Exam  Physical Exam  Ortho/SPM Exam     Imaging:            Assessment:       1. Ulcer of left foot with fat layer exposed    2. History of foot ulcer    3. 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 History of amputation of left foot    5. Type 2 diabetes mellitus with chronic kidney disease on chronic dialysis, with long-term current use of insulin    6. At high risk for inadequate nutritional intake    7. Difficulty in walking, not elsewhere classified    8. Type 2 diabetes mellitus with hypoglycemia and coma, with long-term current use of insulin    9. Ulcer of foot, unspecified laterality, unspecified ulcer stage    10. Peripheral vascular disease    11. At risk for readmission to hospital    12. Decreased pedal pulses    13. Diabetic foot  infection    14. History of TIA (transient ischemic attack)    15. ESRD (end stage renal disease)    16. Bilateral lower extremity edema    17. Hypertension associated with diabetes    18. Onychogryphosis      Plan:   Ulcer of left foot with fat layer exposed    History of foot ulcer    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    History of amputation of left foot    Type 2 diabetes mellitus with chronic kidney disease on chronic dialysis, with long-term current use of insulin    At high risk for inadequate nutritional intake    Difficulty in walking, not elsewhere classified    Type 2 diabetes mellitus with hypoglycemia and coma, with long-term current use of insulin    Ulcer of foot, unspecified laterality, unspecified ulcer stage    Peripheral vascular disease    At risk for readmission to hospital    Decreased pedal pulses    Diabetic foot infection    History of TIA (transient ischemic attack)    ESRD (end stage renal disease)    Bilateral lower extremity edema    Hypertension associated with diabetes    Onychogryphosis      Follow up in about 1 week (around 8/29/2023).    Procedures          Counseling:     I provided patient education verbally regarding:   Patient diagnosis, treatment options, as well as alternatives, risks, and benefits.     This note was created using Dragon voice recognition software that occasionally misinterpreted phrases or words.

## 2023-08-29 ENCOUNTER — OFFICE VISIT (OUTPATIENT)
Dept: WOUND CARE | Facility: HOSPITAL | Age: 58
End: 2023-08-29
Attending: PODIATRIST
Payer: MEDICARE

## 2023-08-29 VITALS
DIASTOLIC BLOOD PRESSURE: 90 MMHG | SYSTOLIC BLOOD PRESSURE: 170 MMHG | HEART RATE: 76 BPM | RESPIRATION RATE: 18 BRPM | TEMPERATURE: 99 F

## 2023-08-29 DIAGNOSIS — E11.22 TYPE 2 DIABETES MELLITUS WITH CHRONIC KIDNEY DISEASE ON CHRONIC DIALYSIS, WITH LONG-TERM CURRENT USE OF INSULIN: ICD-10-CM

## 2023-08-29 DIAGNOSIS — E11.641 TYPE 2 DIABETES MELLITUS WITH HYPOGLYCEMIA AND COMA, WITH LONG-TERM CURRENT USE OF INSULIN: ICD-10-CM

## 2023-08-29 DIAGNOSIS — N18.6 TYPE 2 DIABETES MELLITUS WITH CHRONIC KIDNEY DISEASE ON CHRONIC DIALYSIS, WITH LONG-TERM CURRENT USE OF INSULIN: ICD-10-CM

## 2023-08-29 DIAGNOSIS — Z86.73 HISTORY OF TIA (TRANSIENT ISCHEMIC ATTACK): ICD-10-CM

## 2023-08-29 DIAGNOSIS — E11.628 DIABETIC FOOT INFECTION: ICD-10-CM

## 2023-08-29 DIAGNOSIS — Z79.4 TYPE 2 DIABETES MELLITUS WITH HYPOGLYCEMIA AND COMA, WITH LONG-TERM CURRENT USE OF INSULIN: ICD-10-CM

## 2023-08-29 DIAGNOSIS — R09.89 DECREASED PEDAL PULSES: ICD-10-CM

## 2023-08-29 DIAGNOSIS — Z91.89 AT RISK FOR READMISSION TO HOSPITAL: ICD-10-CM

## 2023-08-29 DIAGNOSIS — Z79.4 TYPE 2 DIABETES MELLITUS WITH CHRONIC KIDNEY DISEASE ON CHRONIC DIALYSIS, WITH LONG-TERM CURRENT USE OF INSULIN: ICD-10-CM

## 2023-08-29 DIAGNOSIS — Z89.432 HISTORY OF AMPUTATION OF LEFT FOOT: ICD-10-CM

## 2023-08-29 DIAGNOSIS — Z99.2 TYPE 2 DIABETES MELLITUS WITH CHRONIC KIDNEY DISEASE ON CHRONIC DIALYSIS, WITH LONG-TERM CURRENT USE OF INSULIN: ICD-10-CM

## 2023-08-29 DIAGNOSIS — I73.9 PERIPHERAL VASCULAR DISEASE: ICD-10-CM

## 2023-08-29 DIAGNOSIS — L60.2 ONYCHOGRYPHOSIS: ICD-10-CM

## 2023-08-29 DIAGNOSIS — L97.522 ULCER OF LEFT FOOT WITH FAT LAYER EXPOSED: Primary | ICD-10-CM

## 2023-08-29 DIAGNOSIS — R60.0 BILATERAL LOWER EXTREMITY EDEMA: ICD-10-CM

## 2023-08-29 DIAGNOSIS — Z91.89 AT HIGH RISK FOR INADEQUATE NUTRITIONAL INTAKE: ICD-10-CM

## 2023-08-29 DIAGNOSIS — L08.9 DIABETIC FOOT INFECTION: ICD-10-CM

## 2023-08-29 DIAGNOSIS — R26.2 DIFFICULTY IN WALKING, NOT ELSEWHERE CLASSIFIED: ICD-10-CM

## 2023-08-29 DIAGNOSIS — N18.6 ESRD (END STAGE RENAL DISEASE): ICD-10-CM

## 2023-08-29 DIAGNOSIS — E11.649 TYPE 2 DIABETES MELLITUS WITH HYPOGLYCEMIA UNAWARENESS: ICD-10-CM

## 2023-08-29 DIAGNOSIS — Z87.2 HISTORY OF FOOT ULCER: ICD-10-CM

## 2023-08-29 DIAGNOSIS — L97.509 ULCER OF FOOT, UNSPECIFIED LATERALITY, UNSPECIFIED ULCER STAGE: ICD-10-CM

## 2023-08-29 PROCEDURE — 3077F SYST BP >= 140 MM HG: CPT | Mod: CPTII,,, | Performed by: PODIATRIST

## 2023-08-29 PROCEDURE — 99214 OFFICE O/P EST MOD 30 MIN: CPT | Mod: ,,, | Performed by: PODIATRIST

## 2023-08-29 PROCEDURE — 1159F PR MEDICATION LIST DOCUMENTED IN MEDICAL RECORD: ICD-10-PCS | Mod: CPTII,,, | Performed by: PODIATRIST

## 2023-08-29 PROCEDURE — 1160F RVW MEDS BY RX/DR IN RCRD: CPT | Mod: CPTII,,, | Performed by: PODIATRIST

## 2023-08-29 PROCEDURE — 1160F PR REVIEW ALL MEDS BY PRESCRIBER/CLIN PHARMACIST DOCUMENTED: ICD-10-PCS | Mod: CPTII,,, | Performed by: PODIATRIST

## 2023-08-29 PROCEDURE — 4010F ACE/ARB THERAPY RXD/TAKEN: CPT | Mod: CPTII,,, | Performed by: PODIATRIST

## 2023-08-29 PROCEDURE — 99214 PR OFFICE/OUTPT VISIT, EST, LEVL IV, 30-39 MIN: ICD-10-PCS | Mod: ,,, | Performed by: PODIATRIST

## 2023-08-29 PROCEDURE — 3080F PR MOST RECENT DIASTOLIC BLOOD PRESSURE >= 90 MM HG: ICD-10-PCS | Mod: CPTII,,, | Performed by: PODIATRIST

## 2023-08-29 PROCEDURE — 3052F HG A1C>EQUAL 8.0%<EQUAL 9.0%: CPT | Mod: CPTII,,, | Performed by: PODIATRIST

## 2023-08-29 PROCEDURE — 3080F DIAST BP >= 90 MM HG: CPT | Mod: CPTII,,, | Performed by: PODIATRIST

## 2023-08-29 PROCEDURE — 4010F PR ACE/ARB THEARPY RXD/TAKEN: ICD-10-PCS | Mod: CPTII,,, | Performed by: PODIATRIST

## 2023-08-29 PROCEDURE — 3052F PR MOST RECENT HEMOGLOBIN A1C LEVEL 8.0 - < 9.0%: ICD-10-PCS | Mod: CPTII,,, | Performed by: PODIATRIST

## 2023-08-29 PROCEDURE — 1159F MED LIST DOCD IN RCRD: CPT | Mod: CPTII,,, | Performed by: PODIATRIST

## 2023-08-29 PROCEDURE — 3077F PR MOST RECENT SYSTOLIC BLOOD PRESSURE >= 140 MM HG: ICD-10-PCS | Mod: CPTII,,, | Performed by: PODIATRIST

## 2023-08-29 PROCEDURE — 99213 OFFICE O/P EST LOW 20 MIN: CPT | Performed by: PODIATRIST

## 2023-08-30 NOTE — PROGRESS NOTES
[address]  [place] [address]  Phone: [phone]   [phone]    Patient's PCP:Stefano Lopez MD  Referring Provider: Brooke Self    Subjective:      Chief Complaint:: Wound Care, Diabetes, Wound Check, Non-healing Wound, Numbness, Foot Ulcer, Difficulty Walking, Diabetic Foot Ulcer, Pressure Ulcer, Peripheral Neuropathy, and Wound Consult    HPI  \Leanna García is a 57 y.o. female who presents today with reopened left plantar foot diabetic foot ulcer.  See wound docs documentation for full assessment and evaluation of all wounds.         Additionally, patient presents to clinic today for evaluation and treatment of diabetic feet.         Reviewed all notes from patients medical chart from last several months, including imaging, procedures, studies, progress notes,  cultures, antibiotic regimens, photos,  etc.      See Wound Docs for assessment of wounds and procedure notes  2. Continue taking all medications as prescribed  3. Dressing changes, see Wound docs for dressing change orders  4. RTC one week   5. Counseled patient on increasing protein intake, not getting wound wet, keeping dressing clean dry and intact, following a healthy diet, elevating legs when able, removing pressure from wound           Proper ulcer care and the possible need for serial debridements, topical medications, specific dressings and biological engineered skin substitutes if indicated.        Counseling/Education:  I provided patient education verbally regarding:   The aspects of diabetes and how it pertains to the feet. I explained the importance of proper diabetic foot care and how it is essential for the health of their feet.     I discussed the importance of knowing their Hemoglobin A1c and that the level needs to be as close to 6 as possible. I discussed the increase complications of high blood sugar including stroke, blindness, heart attack, kidney failure and loss of limb secondary to  neuropathy and PVD.      With neuropathy, beware of any breaks in the skin or redness. These areas are not recognized early due to the numbness.     I discussed Diabetes, lower back issues, metabolic disorders, systemic causes, chemotherapy, vitamin deficiency, heavy metal exposure, as some of the causes. I also explained that as much as 40% of the time we can not find a cause. I discussed different treatments available to control the symptoms but which may not cure the problem.         Counseled patient on the aspects of diabetes and how it pertains to the feet.  I explained the importance of proper diabetic foot care and how it is essential for the health of their feet.        Shoe inspection. Patient instructed on proper foot hygeine. We discussed wearing proper shoe gear, daily foot inspections, never walking without protective shoe gear, never putting sharp instruments to feet, routine podiatric nail visits every 2-3 months.          Patient should call the office immediately if any signs of infection, such as fever, chills, sweats, increased redness or pain.     Patient was instructed to call the clinic or go to the emergency department if their symptoms do not improve, worsens, or if new symptoms develop.  Patient was advised that if any increased swelling, pain, or numbness arise to go immediately to the ED. Patient knows to call any time if an emergency arises. Shared decision making occurred and patient verbalized understanding in agreement with this plan.         >50% of this > 45 minute visit was spent face to face educating/counseling the patient     I spent a total of 45 minutes on the day of the visit.This includes face to face time and non-face to face time preparing to see the patient (eg, review of tests), obtaining and/or reviewing separately obtained history, documenting clinical information in the electronic or other health record, independently interpreting results and communicating results to  the patient/family/caregiver, or care coordinator.        Much of the documentation for this visit was completed in the Wound Docs system.  Please see the attached documentation for further details about the patient's care. Scanned under the Media tab.         Alivia Bruno DPM     Vitals:    08/29/23 0927   BP: (!) 170/90   Pulse: 76   Resp: 18   Temp: 98.6 °F (37 °C)   PainSc: 0-No pain      Shoe Size:     Past Surgical History:   Procedure Laterality Date    ANGIOGRAPHY OF LOWER EXTREMITY Left 10/18/2022    Procedure: Angiogram Extremity Unilateral;  Surgeon: Ali Khoobehi, MD;  Location: [facility] CATH/EP LAB;  Service: Vascular;  Laterality: Left;    AV FISTULA PLACEMENT Left 8/29/2022    Procedure: CREATION, AV FISTULA;  Surgeon: Ali Khoobehi, MD;  Location: [facility] OR;  Service: Vascular;  Laterality: Left;    BONE BIOPSY Left 8/24/2022    Procedure: Biopsy-Bone;  Surgeon: Porfirio Ponce DPM;  Location: [facility] OR;  Service: Podiatry;  Laterality: Left;    COLONOSCOPY N/A 10/5/2016    Procedure: COLONOSCOPY;  Surgeon: Pillo Chanel MD;  Location: [facility] ENDO;  Service: Endoscopy;  Laterality: N/A;    COLONOSCOPY N/A 4/26/2022    Procedure: COLONOSCOPY;  Surgeon: Saravanan Rachel MD;  Location: [facility] ENDO;  Service: Endoscopy;  Laterality: N/A;    FISTULOGRAM Left 8/24/2022    Procedure: Fistulogram;  Surgeon: Ali Khoobehi, MD;  Location: [facility] CATH/EP LAB;  Service: Vascular;  Laterality: Left;    HERNIA REPAIR Bilateral 11/22/2016    inguinal    HYSTERECTOMY      INCISION AND DRAINAGE FOOT Left 5/13/2022    Procedure: INCISION AND DRAINAGE, FOOT;  Surgeon: Ricardo Bruno DPM;  Location: [facility] OR;  Service: Podiatry;  Laterality: Left;    OOPHORECTOMY      THROMBECTOMY, AV FISTULA, UPPER EXTREMITY, PERCUTANEOUS  8/24/2022    Procedure: THROMBECTOMY, AV FISTULA, UPPER EXTREMITY, PERCUTANEOUS;  Surgeon: Ali Khoobehi, MD;  Location: [facility] CATH/EP LAB;  Service: Vascular;;    TOE AMPUTATION Left 8/26/2022    Procedure:  AMPUTATION, TOE;  Surgeon: Porfirio Ponce DPM;  Location: [facility];  Service: Podiatry;  Laterality: Left;     Past Medical History:   Diagnosis Date    A-fib     resolved per patient    Anemia due to end stage renal disease 6/30/2022    Arthritis     Bronchitis     Cardiovascular event risk, ASCVD 10-year risk 6.7% 10/15/2016    Diabetes mellitus type II     Diabetic foot infection     Diabetic ulcer of left midfoot 05/05/2022    Disorder of kidney and ureter     Encounter for blood transfusion     ESRD (end stage renal disease) 05/18/2018    MANJINDER (generalized anxiety disorder) 10/25/2016    History of colon polyps 11/02/2016    Hyperlipidemia     Hypertension     Stroke      Family History   Problem Relation Age of Onset    Hyperlipidemia Mother     Hypertension Mother     Hypertension Father     Diabetes Father     Diabetes Sister     Diabetes Sister     Diabetes Maternal Aunt     Diabetes Maternal Grandmother     Heart disease Maternal Grandmother     Cancer Paternal Grandmother     Breast cancer Neg Hx     Colon cancer Neg Hx     Ovarian cancer Neg Hx         Social History:   Marital Status:   Alcohol History:  reports no history of alcohol use.  Tobacco History:  reports that she has never smoked. She has never used smokeless tobacco.  Drug History:  reports no history of drug use.    Review of patient's allergies indicates:   Allergen Reactions    Dilaudid [hydromorphone] Nausea And Vomiting    Chlorhexidine Itching    Lortab [hydrocodone-acetaminophen] Itching       Current Outpatient Medications   Medication Sig Dispense Refill    acetaminophen (TYLENOL) 325 MG tablet Take 325 mg by mouth every 6 (six) hours.      ALPRAZolam (XANAX) 0.5 MG tablet Take 1 tablet by mouth.      atorvastatin (LIPITOR) 80 MG tablet Take 1 tablet (80 mg total) by mouth once daily. 90 tablet 3    azelastine (ASTELIN) 137 mcg (0.1 %) nasal spray 1 spray (137 mcg total) by Nasal route 2 (two) times daily. 30 mL 3    blood  "sugar diagnostic Strp To check BG 3 times daily, to use with insurance preferred meter 100 strip 1    blood sugar diagnostic Strp To check BG 4 times daily, to use with insurance preferred meter 450 strip 3    blood sugar diagnostic Strp To check BG 1 times daily, to use with insurance preferred meter 100 each 11    blood-glucose meter kit To check BG 3 times daily, to use with insurance preferred meter 1 each 0    blood-glucose meter kit To check BG 3 times daily, to use with insurance preferred meter 1 each 0    cetirizine (ZYRTEC) 10 MG tablet Take 1 tablet (10 mg total) by mouth every other day. 45 tablet 3    clopidogreL (PLAVIX) 75 mg tablet Take 1 tablet (75 mg total) by mouth once daily. 90 tablet 3    doxycycline (MONODOX) 100 MG capsule Take 1 capsule (100 mg total) by mouth 2 (two) times daily. 20 capsule 0    epoetin chris-epbx (RETACRIT) 4,000 unit/mL injection Inject 1.11 mLs (4,440 Units total) into the skin every Mon, Wed, Fri.      EScitalopram oxalate (LEXAPRO) 10 MG tablet Take 1 tablet (10 mg total) by mouth once daily. 30 tablet 11    fluticasone propionate (FLONASE) 50 mcg/actuation nasal spray 2 sprays (100 mcg total) by Each Nostril route once daily. 16 g 3    gabapentin (NEURONTIN) 100 MG capsule Take 1 capsule (100 mg total) by mouth 2 (two) times daily. 180 capsule 3    hydrALAZINE (APRESOLINE) 25 MG tablet Take 1 tablet (25 mg total) by mouth 2 (two) times daily as needed (Systolic blood pressure > 170 mm Hg).      lancets Misc To check BG 3 times daily, to use with insurance preferred meter 100 each 1    lancets Misc To check BG 1 times daily, to use with insurance preferred meter 100 each 11    minoxidiL (LONITEN) 10 MG Tab Take 10 mg by mouth 2 (two) times daily.      multivitamin (THERAGRAN) per tablet Take 1 tablet by mouth.      pen needle, diabetic (BD ULTRA-FINE ROBERT PEN NEEDLE) 32 gauge x 5/32" Ndle 1 pen by Misc.(Non-Drug; Combo Route) route 4 (four) times daily. To use 4 times " per day with insulin injections. 100 each 1    semaglutide (OZEMPIC) 0.25 mg or 0.5 mg (2 mg/3 mL) pen injector Inject 0.25 mg into the skin every 7 days. 1.5 mL 2    sucroferric oxyhydroxide (VELPHORO) 500 mg Chew Take 3 tablets by mouth.       No current facility-administered medications for this visit.       Review of Systems   Constitutional:  Negative for chills, fatigue, fever and unexpected weight change.   HENT:  Negative for hearing loss and trouble swallowing.    Eyes:  Negative for photophobia and visual disturbance.   Respiratory:  Negative for cough, shortness of breath and wheezing.    Cardiovascular:  Negative for chest pain, palpitations and leg swelling.   Gastrointestinal:  Negative for abdominal pain and nausea.   Genitourinary:  Negative for dysuria and frequency.   Musculoskeletal:  Positive for gait problem. Negative for arthralgias, back pain, joint swelling and myalgias.   Skin:  Positive for wound. Negative for rash.   Neurological:  Positive for numbness. Negative for tremors, seizures, weakness and headaches.   Hematological:  Does not bruise/bleed easily.         Objective:        Physical Exam:   Foot Exam  Physical Exam  Ortho/SPM Exam     Imaging:            Assessment:       1. Ulcer of left foot with fat layer exposed    2. History of foot ulcer    3. 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 History of amputation of left foot    5. ESRD (end stage renal disease)    6. Peripheral vascular disease    7. Type 2 diabetes mellitus with chronic kidney disease on chronic dialysis, with long-term current use of insulin    8. Ulcer of foot, unspecified laterality, unspecified ulcer stage    9. History of TIA (transient ischemic attack)    10. Type 2 diabetes mellitus with hypoglycemia and coma, with long-term current use of insulin    11. Diabetic foot infection    12. Decreased pedal pulses    13. Difficulty in walking, not elsewhere classified    14. At high risk for  inadequate nutritional intake    15. At risk for readmission to hospital    16. Bilateral lower extremity edema    17. Onychogryphosis      Plan:   Ulcer of left foot with fat layer exposed    History of foot ulcer    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    History of amputation of left foot    ESRD (end stage renal disease)    Peripheral vascular disease    Type 2 diabetes mellitus with chronic kidney disease on chronic dialysis, with long-term current use of insulin    Ulcer of foot, unspecified laterality, unspecified ulcer stage    History of TIA (transient ischemic attack)    Type 2 diabetes mellitus with hypoglycemia and coma, with long-term current use of insulin    Diabetic foot infection    Decreased pedal pulses    Difficulty in walking, not elsewhere classified    At high risk for inadequate nutritional intake    At risk for readmission to hospital    Bilateral lower extremity edema    Onychogryphosis      Follow up in about 1 week (around 9/5/2023).    Procedures          Counseling:     I provided patient education verbally regarding:   Patient diagnosis, treatment options, as well as alternatives, risks, and benefits.     This note was created using Dragon voice recognition software that occasionally misinterpreted phrases or words.

## 2023-08-31 ENCOUNTER — PATIENT MESSAGE (OUTPATIENT)
Dept: ADMINISTRATIVE | Facility: HOSPITAL | Age: 58
End: 2023-08-31
Payer: MEDICARE

## 2023-09-05 ENCOUNTER — OFFICE VISIT (OUTPATIENT)
Dept: WOUND CARE | Facility: HOSPITAL | Age: 58
End: 2023-09-05
Attending: PODIATRIST
Payer: MEDICARE

## 2023-09-05 VITALS
TEMPERATURE: 99 F | DIASTOLIC BLOOD PRESSURE: 110 MMHG | RESPIRATION RATE: 16 BRPM | SYSTOLIC BLOOD PRESSURE: 176 MMHG | HEART RATE: 79 BPM

## 2023-09-05 DIAGNOSIS — E11.628 DIABETIC FOOT INFECTION: ICD-10-CM

## 2023-09-05 DIAGNOSIS — Z79.4 TYPE 2 DIABETES MELLITUS WITH CHRONIC KIDNEY DISEASE ON CHRONIC DIALYSIS, WITH LONG-TERM CURRENT USE OF INSULIN: ICD-10-CM

## 2023-09-05 DIAGNOSIS — R26.2 DIFFICULTY IN WALKING, NOT ELSEWHERE CLASSIFIED: ICD-10-CM

## 2023-09-05 DIAGNOSIS — I73.9 PERIPHERAL VASCULAR DISEASE: ICD-10-CM

## 2023-09-05 DIAGNOSIS — R09.89 DECREASED PEDAL PULSES: ICD-10-CM

## 2023-09-05 DIAGNOSIS — E11.649 TYPE 2 DIABETES MELLITUS WITH HYPOGLYCEMIA UNAWARENESS: ICD-10-CM

## 2023-09-05 DIAGNOSIS — Z99.2 TYPE 2 DIABETES MELLITUS WITH CHRONIC KIDNEY DISEASE ON CHRONIC DIALYSIS, WITH LONG-TERM CURRENT USE OF INSULIN: ICD-10-CM

## 2023-09-05 DIAGNOSIS — R60.0 BILATERAL LOWER EXTREMITY EDEMA: ICD-10-CM

## 2023-09-05 DIAGNOSIS — L97.522 ULCER OF LEFT FOOT WITH FAT LAYER EXPOSED: Primary | ICD-10-CM

## 2023-09-05 DIAGNOSIS — E11.22 TYPE 2 DIABETES MELLITUS WITH CHRONIC KIDNEY DISEASE ON CHRONIC DIALYSIS, WITH LONG-TERM CURRENT USE OF INSULIN: ICD-10-CM

## 2023-09-05 DIAGNOSIS — Z79.4 TYPE 2 DIABETES MELLITUS WITH HYPOGLYCEMIA AND COMA, WITH LONG-TERM CURRENT USE OF INSULIN: ICD-10-CM

## 2023-09-05 DIAGNOSIS — Z91.89 AT RISK FOR READMISSION TO HOSPITAL: ICD-10-CM

## 2023-09-05 DIAGNOSIS — E11.641 TYPE 2 DIABETES MELLITUS WITH HYPOGLYCEMIA AND COMA, WITH LONG-TERM CURRENT USE OF INSULIN: ICD-10-CM

## 2023-09-05 DIAGNOSIS — L08.9 DIABETIC FOOT INFECTION: ICD-10-CM

## 2023-09-05 DIAGNOSIS — Z91.89 AT HIGH RISK FOR INADEQUATE NUTRITIONAL INTAKE: ICD-10-CM

## 2023-09-05 DIAGNOSIS — N18.6 TYPE 2 DIABETES MELLITUS WITH CHRONIC KIDNEY DISEASE ON CHRONIC DIALYSIS, WITH LONG-TERM CURRENT USE OF INSULIN: ICD-10-CM

## 2023-09-05 DIAGNOSIS — Z86.73 HISTORY OF TIA (TRANSIENT ISCHEMIC ATTACK): ICD-10-CM

## 2023-09-05 DIAGNOSIS — L60.2 ONYCHOGRYPHOSIS: ICD-10-CM

## 2023-09-05 DIAGNOSIS — Z89.432 HISTORY OF AMPUTATION OF LEFT FOOT: ICD-10-CM

## 2023-09-05 DIAGNOSIS — Z87.2 HISTORY OF FOOT ULCER: ICD-10-CM

## 2023-09-05 DIAGNOSIS — N18.6 ESRD (END STAGE RENAL DISEASE): ICD-10-CM

## 2023-09-05 PROCEDURE — 1160F PR REVIEW ALL MEDS BY PRESCRIBER/CLIN PHARMACIST DOCUMENTED: ICD-10-PCS | Mod: CPTII,,, | Performed by: PODIATRIST

## 2023-09-05 PROCEDURE — 3052F PR MOST RECENT HEMOGLOBIN A1C LEVEL 8.0 - < 9.0%: ICD-10-PCS | Mod: CPTII,,, | Performed by: PODIATRIST

## 2023-09-05 PROCEDURE — 3080F DIAST BP >= 90 MM HG: CPT | Mod: CPTII,,, | Performed by: PODIATRIST

## 2023-09-05 PROCEDURE — 4010F ACE/ARB THERAPY RXD/TAKEN: CPT | Mod: CPTII,,, | Performed by: PODIATRIST

## 2023-09-05 PROCEDURE — 11042 DBRDMT SUBQ TIS 1ST 20SQCM/<: CPT | Mod: ,,, | Performed by: PODIATRIST

## 2023-09-05 PROCEDURE — 11042 PR DEBRIDEMENT, SKIN, SUB-Q TISSUE,=<20 SQ CM: ICD-10-PCS | Mod: ,,, | Performed by: PODIATRIST

## 2023-09-05 PROCEDURE — 4010F PR ACE/ARB THEARPY RXD/TAKEN: ICD-10-PCS | Mod: CPTII,,, | Performed by: PODIATRIST

## 2023-09-05 PROCEDURE — 1159F PR MEDICATION LIST DOCUMENTED IN MEDICAL RECORD: ICD-10-PCS | Mod: CPTII,,, | Performed by: PODIATRIST

## 2023-09-05 PROCEDURE — 99214 OFFICE O/P EST MOD 30 MIN: CPT | Mod: 25,,, | Performed by: PODIATRIST

## 2023-09-05 PROCEDURE — 3077F SYST BP >= 140 MM HG: CPT | Mod: CPTII,,, | Performed by: PODIATRIST

## 2023-09-05 PROCEDURE — 1160F RVW MEDS BY RX/DR IN RCRD: CPT | Mod: CPTII,,, | Performed by: PODIATRIST

## 2023-09-05 PROCEDURE — 1159F MED LIST DOCD IN RCRD: CPT | Mod: CPTII,,, | Performed by: PODIATRIST

## 2023-09-05 PROCEDURE — 3080F PR MOST RECENT DIASTOLIC BLOOD PRESSURE >= 90 MM HG: ICD-10-PCS | Mod: CPTII,,, | Performed by: PODIATRIST

## 2023-09-05 PROCEDURE — 3077F PR MOST RECENT SYSTOLIC BLOOD PRESSURE >= 140 MM HG: ICD-10-PCS | Mod: CPTII,,, | Performed by: PODIATRIST

## 2023-09-05 PROCEDURE — 11042 DBRDMT SUBQ TIS 1ST 20SQCM/<: CPT | Performed by: PODIATRIST

## 2023-09-05 PROCEDURE — 3052F HG A1C>EQUAL 8.0%<EQUAL 9.0%: CPT | Mod: CPTII,,, | Performed by: PODIATRIST

## 2023-09-05 PROCEDURE — 99214 PR OFFICE/OUTPT VISIT, EST, LEVL IV, 30-39 MIN: ICD-10-PCS | Mod: 25,,, | Performed by: PODIATRIST

## 2023-09-05 RX ORDER — DOXYCYCLINE 100 MG/1
100 CAPSULE ORAL 2 TIMES DAILY
Qty: 28 CAPSULE | Refills: 0 | Status: SHIPPED | OUTPATIENT
Start: 2023-09-05 | End: 2023-09-19

## 2023-09-05 NOTE — PROGRESS NOTES
[address] [facility] [address]  [place] [address]  Phone: [phone]   [phone]    Patient's PCP:Stefano Lopez MD  Referring Provider: Aaareferral Self    Subjective:      Chief Complaint:: Wound Care, Wound Consult, Diabetes, Wound Check, Diabetic Foot Ulcer (New diabetic foot ulcer, located medial to original wound), Non-healing Wound, Pressure Ulcer, Numbness, Peripheral Neuropathy, Foot Ulcer, Difficulty Walking, and Poor Circulation    HPI  Leanna García is a 57 y.o. female who presents today with left plantar midfoot foot diabetic foot ulcer.  Additionally, patient presents with a new diabetic foot ulcer located medial to original diabetic foot ulcer.   See wound docs documentation for full assessment and evaluation of all wounds.         Additionally, patient presents to clinic today for evaluation and treatment of diabetic feet.         Reviewed all notes from patients medical chart from last several months, including imaging, procedures, studies, progress notes,  cultures, antibiotic regimens, photos,  etc.      Debridement of new diabetic foot ulcer.  Evaluation and Assessment only of original left plantar midfoot diabetic foot ulcer.  See Wound Docs for assessment of wounds and procedure notes  2. Continue taking all medications as prescribed  3. Dressing changes, see Wound docs for dressing change orders  4. RTC one week   5. Counseled patient on increasing protein intake, not getting wound wet, keeping dressing clean dry and intact, following a healthy diet, elevating legs when able, removing pressure from wound           Proper ulcer care and the possible need for serial debridements, topical medications, specific dressings and biological engineered skin substitutes if indicated.        Counseling/Education:  I provided patient education verbally regarding:   The aspects of diabetes and how it pertains to the feet. I explained the importance of proper diabetic foot care and how it  is essential for the health of their feet.     I discussed the importance of knowing their Hemoglobin A1c and that the level needs to be as close to 6 as possible. I discussed the increase complications of high blood sugar including stroke, blindness, heart attack, kidney failure and loss of limb secondary to neuropathy and PVD.      With neuropathy, beware of any breaks in the skin or redness. These areas are not recognized early due to the numbness.     I discussed Diabetes, lower back issues, metabolic disorders, systemic causes, chemotherapy, vitamin deficiency, heavy metal exposure, as some of the causes. I also explained that as much as 40% of the time we can not find a cause. I discussed different treatments available to control the symptoms but which may not cure the problem.         Counseled patient on the aspects of diabetes and how it pertains to the feet.  I explained the importance of proper diabetic foot care and how it is essential for the health of their feet.        Shoe inspection. Patient instructed on proper foot hygeine. We discussed wearing proper shoe gear, daily foot inspections, never walking without protective shoe gear, never putting sharp instruments to feet, routine podiatric nail visits every 2-3 months.          Patient should call the office immediately if any signs of infection, such as fever, chills, sweats, increased redness or pain.     Patient was instructed to call the clinic or go to the emergency department if their symptoms do not improve, worsens, or if new symptoms develop.  Patient was advised that if any increased swelling, pain, or numbness arise to go immediately to the ED. Patient knows to call any time if an emergency arises. Shared decision making occurred and patient verbalized understanding in agreement with this plan.         >50% of this > 45 minute visit was spent face to face educating/counseling the patient     I spent a total of 45 minutes on the day of the  visit.This includes face to face time and non-face to face time preparing to see the patient (eg, review of tests), obtaining and/or reviewing separately obtained history, documenting clinical information in the electronic or other health record, independently interpreting results and communicating results to the patient/family/caregiver, or care coordinator.        Much of the documentation for this visit was completed in the Wound Docs system.  Please see the attached documentation for further details about the patient's care. Scanned under the Media tab.         Alivia Bruno DPM     Vitals:    09/05/23 1148   BP: (!) 176/110   Pulse: 79   Resp: 16   Temp: 98.6 °F (37 °C)   PainSc: 0-No pain      Shoe Size:     Past Surgical History:   Procedure Laterality Date    ANGIOGRAPHY OF LOWER EXTREMITY Left 10/18/2022    Procedure: Angiogram Extremity Unilateral;  Surgeon: Ali Khoobehi, MD;  Location: [facility] CATH/EP LAB;  Service: Vascular;  Laterality: Left;    AV FISTULA PLACEMENT Left 8/29/2022    Procedure: CREATION, AV FISTULA;  Surgeon: Ali Khoobehi, MD;  Location: [facility];  Service: Vascular;  Laterality: Left;    BONE BIOPSY Left 8/24/2022    Procedure: Biopsy-Bone;  Surgeon: Porfirio Ponce DPM;  Location: [facility];  Service: Podiatry;  Laterality: Left;    COLONOSCOPY N/A 10/5/2016    Procedure: COLONOSCOPY;  Surgeon: Pillo Chanel MD;  Location: [facility] ENDO;  Service: Endoscopy;  Laterality: N/A;    COLONOSCOPY N/A 4/26/2022    Procedure: COLONOSCOPY;  Surgeon: Saravanan Rachel MD;  Location: [facility] ENDO;  Service: Endoscopy;  Laterality: N/A;    FISTULOGRAM Left 8/24/2022    Procedure: Fistulogram;  Surgeon: Ali Khoobehi, MD;  Location: [facility] CATH/EP LAB;  Service: Vascular;  Laterality: Left;    HERNIA REPAIR Bilateral 11/22/2016    inguinal    HYSTERECTOMY      INCISION AND DRAINAGE FOOT Left 5/13/2022    Procedure: INCISION AND DRAINAGE, FOOT;  Surgeon: Ricardo Bruno DPM;  Location: [facility];  Service:  Podiatry;  Laterality: Left;    OOPHORECTOMY      THROMBECTOMY, AV FISTULA, UPPER EXTREMITY, PERCUTANEOUS  8/24/2022    Procedure: THROMBECTOMY, AV FISTULA, UPPER EXTREMITY, PERCUTANEOUS;  Surgeon: Ali Khoobehi, MD;  Location: [facility] CATH/EP LAB;  Service: Vascular;;    TOE AMPUTATION Left 8/26/2022    Procedure: AMPUTATION, TOE;  Surgeon: Porfirio Ponce DPM;  Location: [facility] OR;  Service: Podiatry;  Laterality: Left;     Past Medical History:   Diagnosis Date    A-fib     resolved per patient    Anemia due to end stage renal disease 6/30/2022    Arthritis     Bronchitis     Cardiovascular event risk, ASCVD 10-year risk 6.7% 10/15/2016    Diabetes mellitus type II     Diabetic foot infection     Diabetic ulcer of left midfoot 05/05/2022    Disorder of kidney and ureter     Encounter for blood transfusion     ESRD (end stage renal disease) 05/18/2018    MANJINDER (generalized anxiety disorder) 10/25/2016    History of colon polyps 11/02/2016    Hyperlipidemia     Hypertension     Stroke      Family History   Problem Relation Age of Onset    Hyperlipidemia Mother     Hypertension Mother     Hypertension Father     Diabetes Father     Diabetes Sister     Diabetes Sister     Diabetes Maternal Aunt     Diabetes Maternal Grandmother     Heart disease Maternal Grandmother     Cancer Paternal Grandmother     Breast cancer Neg Hx     Colon cancer Neg Hx     Ovarian cancer Neg Hx         Social History:   Marital Status:   Alcohol History:  reports no history of alcohol use.  Tobacco History:  reports that she has never smoked. She has never used smokeless tobacco.  Drug History:  reports no history of drug use.    Review of patient's allergies indicates:   Allergen Reactions    Dilaudid [hydromorphone] Nausea And Vomiting    Chlorhexidine Itching    Lortab [hydrocodone-acetaminophen] Itching       Current Outpatient Medications   Medication Sig Dispense Refill    acetaminophen (TYLENOL) 325 MG tablet Take 325 mg by mouth  every 6 (six) hours.      ALPRAZolam (XANAX) 0.5 MG tablet Take 1 tablet by mouth.      atorvastatin (LIPITOR) 80 MG tablet Take 1 tablet (80 mg total) by mouth once daily. 90 tablet 3    azelastine (ASTELIN) 137 mcg (0.1 %) nasal spray 1 spray (137 mcg total) by Nasal route 2 (two) times daily. 30 mL 3    blood sugar diagnostic Strp To check BG 3 times daily, to use with insurance preferred meter 100 strip 1    blood sugar diagnostic Strp To check BG 4 times daily, to use with insurance preferred meter 450 strip 3    blood sugar diagnostic Strp To check BG 1 times daily, to use with insurance preferred meter 100 each 11    blood-glucose meter kit To check BG 3 times daily, to use with insurance preferred meter 1 each 0    blood-glucose meter kit To check BG 3 times daily, to use with insurance preferred meter 1 each 0    cetirizine (ZYRTEC) 10 MG tablet Take 1 tablet (10 mg total) by mouth every other day. 45 tablet 3    clopidogreL (PLAVIX) 75 mg tablet Take 1 tablet (75 mg total) by mouth once daily. 90 tablet 3    doxycycline (MONODOX) 100 MG capsule Take 1 capsule (100 mg total) by mouth 2 (two) times daily. 20 capsule 0    doxycycline (VIBRAMYCIN) 100 MG Cap Take 1 capsule (100 mg total) by mouth 2 (two) times daily. for 14 days 28 capsule 0    epoetin chris-epbx (RETACRIT) 4,000 unit/mL injection Inject 1.11 mLs (4,440 Units total) into the skin every Mon, Wed, Fri.      EScitalopram oxalate (LEXAPRO) 10 MG tablet Take 1 tablet (10 mg total) by mouth once daily. 30 tablet 11    fluticasone propionate (FLONASE) 50 mcg/actuation nasal spray 2 sprays (100 mcg total) by Each Nostril route once daily. 16 g 3    gabapentin (NEURONTIN) 100 MG capsule Take 1 capsule (100 mg total) by mouth 2 (two) times daily. 180 capsule 3    hydrALAZINE (APRESOLINE) 25 MG tablet Take 1 tablet (25 mg total) by mouth 2 (two) times daily as needed (Systolic blood pressure > 170 mm Hg).      lancets Misc To check BG 3 times daily, to  "use with insurance preferred meter 100 each 1    lancets Misc To check BG 1 times daily, to use with insurance preferred meter 100 each 11    minoxidiL (LONITEN) 10 MG Tab Take 10 mg by mouth 2 (two) times daily.      multivitamin (THERAGRAN) per tablet Take 1 tablet by mouth.      pen needle, diabetic (BD ULTRA-FINE ROBERT PEN NEEDLE) 32 gauge x 5/32" Ndle 1 pen by Misc.(Non-Drug; Combo Route) route 4 (four) times daily. To use 4 times per day with insulin injections. 100 each 1    sucroferric oxyhydroxide (VELPHORO) 500 mg Chew Take 3 tablets by mouth.       No current facility-administered medications for this visit.       Review of Systems   Constitutional:  Negative for chills, fatigue, fever and unexpected weight change.   HENT:  Negative for hearing loss and trouble swallowing.    Eyes:  Negative for photophobia and visual disturbance.   Respiratory:  Negative for cough, shortness of breath and wheezing.    Cardiovascular:  Negative for chest pain, palpitations and leg swelling.   Gastrointestinal:  Negative for abdominal pain and nausea.   Genitourinary:  Negative for dysuria and frequency.   Musculoskeletal:  Positive for gait problem. Negative for arthralgias, back pain, joint swelling and myalgias.   Skin:  Positive for wound. Negative for rash.   Neurological:  Positive for numbness. Negative for tremors, seizures, weakness and headaches.   Hematological:  Does not bruise/bleed easily.         Objective:        Physical Exam:   Foot Exam  Physical Exam  Ortho/SPM Exam     Imaging:            Assessment:       1. Ulcer of left foot with fat layer exposed    2. History of foot ulcer    3. 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 ESRD (end stage renal disease)    5. Peripheral vascular disease    6. Type 2 diabetes mellitus with chronic kidney disease on chronic dialysis, with long-term current use of insulin    7. History of amputation of left foot    8. History of TIA (transient ischemic " attack)    9. At risk for readmission to hospital    10. Type 2 diabetes mellitus with hypoglycemia and coma, with long-term current use of insulin    11. Difficulty in walking, not elsewhere classified    12. At high risk for inadequate nutritional intake    13. Diabetic foot infection    14. Bilateral lower extremity edema    15. Onychogryphosis    16. Decreased pedal pulses      Plan:   Ulcer of left foot with fat layer exposed    History of foot ulcer    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    ESRD (end stage renal disease)    Peripheral vascular disease    Type 2 diabetes mellitus with chronic kidney disease on chronic dialysis, with long-term current use of insulin    History of amputation of left foot    History of TIA (transient ischemic attack)    At risk for readmission to hospital    Type 2 diabetes mellitus with hypoglycemia and coma, with long-term current use of insulin    Difficulty in walking, not elsewhere classified    At high risk for inadequate nutritional intake    Diabetic foot infection    Bilateral lower extremity edema    Onychogryphosis    Decreased pedal pulses    Other orders  -     doxycycline (VIBRAMYCIN) 100 MG Cap; Take 1 capsule (100 mg total) by mouth 2 (two) times daily. for 14 days  Dispense: 28 capsule; Refill: 0      Follow up in about 1 week (around 9/12/2023).    Procedures          Counseling:     I provided patient education verbally regarding:   Patient diagnosis, treatment options, as well as alternatives, risks, and benefits.     This note was created using Dragon voice recognition software that occasionally misinterpreted phrases or words.

## 2023-09-12 ENCOUNTER — OFFICE VISIT (OUTPATIENT)
Dept: WOUND CARE | Facility: HOSPITAL | Age: 58
End: 2023-09-12
Attending: PODIATRIST
Payer: MEDICARE

## 2023-09-12 VITALS — TEMPERATURE: 99 F | RESPIRATION RATE: 16 BRPM

## 2023-09-12 DIAGNOSIS — I15.2 HYPERTENSION ASSOCIATED WITH DIABETES: ICD-10-CM

## 2023-09-12 DIAGNOSIS — E11.621 DIABETIC ULCER OF LEFT MIDFOOT ASSOCIATED WITH TYPE 2 DIABETES MELLITUS, WITH NECROSIS OF MUSCLE: ICD-10-CM

## 2023-09-12 DIAGNOSIS — E11.628 DIABETIC FOOT INFECTION: ICD-10-CM

## 2023-09-12 DIAGNOSIS — Z87.2 HISTORY OF FOOT ULCER: ICD-10-CM

## 2023-09-12 DIAGNOSIS — Z86.73 HISTORY OF TIA (TRANSIENT ISCHEMIC ATTACK): ICD-10-CM

## 2023-09-12 DIAGNOSIS — E11.9 COMPREHENSIVE DIABETIC FOOT EXAMINATION, TYPE 2 DM, ENCOUNTER FOR: ICD-10-CM

## 2023-09-12 DIAGNOSIS — E11.59 HYPERTENSION ASSOCIATED WITH DIABETES: ICD-10-CM

## 2023-09-12 DIAGNOSIS — L60.2 ONYCHOGRYPHOSIS: ICD-10-CM

## 2023-09-12 DIAGNOSIS — E11.22 TYPE 2 DIABETES MELLITUS WITH CHRONIC KIDNEY DISEASE ON CHRONIC DIALYSIS, WITH LONG-TERM CURRENT USE OF INSULIN: ICD-10-CM

## 2023-09-12 DIAGNOSIS — L08.9 DIABETIC FOOT INFECTION: ICD-10-CM

## 2023-09-12 DIAGNOSIS — R60.0 BILATERAL LOWER EXTREMITY EDEMA: ICD-10-CM

## 2023-09-12 DIAGNOSIS — L97.522 ULCER OF LEFT FOOT WITH FAT LAYER EXPOSED: Primary | ICD-10-CM

## 2023-09-12 DIAGNOSIS — E11.649 TYPE 2 DIABETES MELLITUS WITH HYPOGLYCEMIA UNAWARENESS: ICD-10-CM

## 2023-09-12 DIAGNOSIS — N18.6 ESRD (END STAGE RENAL DISEASE): ICD-10-CM

## 2023-09-12 DIAGNOSIS — L97.509 ULCER OF FOOT, UNSPECIFIED LATERALITY, UNSPECIFIED ULCER STAGE: ICD-10-CM

## 2023-09-12 DIAGNOSIS — Z89.432 HISTORY OF AMPUTATION OF LEFT FOOT: ICD-10-CM

## 2023-09-12 DIAGNOSIS — R09.89 DECREASED PEDAL PULSES: ICD-10-CM

## 2023-09-12 DIAGNOSIS — Z99.2 TYPE 2 DIABETES MELLITUS WITH CHRONIC KIDNEY DISEASE ON CHRONIC DIALYSIS, WITH LONG-TERM CURRENT USE OF INSULIN: ICD-10-CM

## 2023-09-12 DIAGNOSIS — L97.423 DIABETIC ULCER OF LEFT MIDFOOT ASSOCIATED WITH TYPE 2 DIABETES MELLITUS, WITH NECROSIS OF MUSCLE: ICD-10-CM

## 2023-09-12 DIAGNOSIS — Z91.89 AT HIGH RISK FOR INADEQUATE NUTRITIONAL INTAKE: ICD-10-CM

## 2023-09-12 DIAGNOSIS — Z91.89 AT RISK FOR READMISSION TO HOSPITAL: ICD-10-CM

## 2023-09-12 DIAGNOSIS — N18.6 TYPE 2 DIABETES MELLITUS WITH CHRONIC KIDNEY DISEASE ON CHRONIC DIALYSIS, WITH LONG-TERM CURRENT USE OF INSULIN: ICD-10-CM

## 2023-09-12 DIAGNOSIS — I73.9 PERIPHERAL VASCULAR DISEASE: ICD-10-CM

## 2023-09-12 DIAGNOSIS — E11.9 ENCOUNTER FOR DIABETIC FOOT EXAM: ICD-10-CM

## 2023-09-12 DIAGNOSIS — E11.641 TYPE 2 DIABETES MELLITUS WITH HYPOGLYCEMIA AND COMA, WITH LONG-TERM CURRENT USE OF INSULIN: ICD-10-CM

## 2023-09-12 DIAGNOSIS — Z79.4 TYPE 2 DIABETES MELLITUS WITH HYPOGLYCEMIA AND COMA, WITH LONG-TERM CURRENT USE OF INSULIN: ICD-10-CM

## 2023-09-12 DIAGNOSIS — Z79.4 TYPE 2 DIABETES MELLITUS WITH CHRONIC KIDNEY DISEASE ON CHRONIC DIALYSIS, WITH LONG-TERM CURRENT USE OF INSULIN: ICD-10-CM

## 2023-09-12 DIAGNOSIS — R26.2 DIFFICULTY IN WALKING, NOT ELSEWHERE CLASSIFIED: ICD-10-CM

## 2023-09-12 PROCEDURE — 3052F PR MOST RECENT HEMOGLOBIN A1C LEVEL 8.0 - < 9.0%: ICD-10-PCS | Mod: CPTII,,, | Performed by: PODIATRIST

## 2023-09-12 PROCEDURE — 11042 DBRDMT SUBQ TIS 1ST 20SQCM/<: CPT | Performed by: PODIATRIST

## 2023-09-12 PROCEDURE — 4010F PR ACE/ARB THEARPY RXD/TAKEN: ICD-10-PCS | Mod: CPTII,,, | Performed by: PODIATRIST

## 2023-09-12 PROCEDURE — 4010F ACE/ARB THERAPY RXD/TAKEN: CPT | Mod: CPTII,,, | Performed by: PODIATRIST

## 2023-09-12 PROCEDURE — 11042 PR DEBRIDEMENT, SKIN, SUB-Q TISSUE,=<20 SQ CM: ICD-10-PCS | Mod: ,,, | Performed by: PODIATRIST

## 2023-09-12 PROCEDURE — 1160F PR REVIEW ALL MEDS BY PRESCRIBER/CLIN PHARMACIST DOCUMENTED: ICD-10-PCS | Mod: CPTII,,, | Performed by: PODIATRIST

## 2023-09-12 PROCEDURE — 1159F PR MEDICATION LIST DOCUMENTED IN MEDICAL RECORD: ICD-10-PCS | Mod: CPTII,,, | Performed by: PODIATRIST

## 2023-09-12 PROCEDURE — 3052F HG A1C>EQUAL 8.0%<EQUAL 9.0%: CPT | Mod: CPTII,,, | Performed by: PODIATRIST

## 2023-09-12 PROCEDURE — 11042 DBRDMT SUBQ TIS 1ST 20SQCM/<: CPT | Mod: ,,, | Performed by: PODIATRIST

## 2023-09-12 PROCEDURE — 99214 PR OFFICE/OUTPT VISIT, EST, LEVL IV, 30-39 MIN: ICD-10-PCS | Mod: 25,,, | Performed by: PODIATRIST

## 2023-09-12 PROCEDURE — 99214 OFFICE O/P EST MOD 30 MIN: CPT | Mod: 25,,, | Performed by: PODIATRIST

## 2023-09-12 PROCEDURE — 1159F MED LIST DOCD IN RCRD: CPT | Mod: CPTII,,, | Performed by: PODIATRIST

## 2023-09-12 PROCEDURE — 1160F RVW MEDS BY RX/DR IN RCRD: CPT | Mod: CPTII,,, | Performed by: PODIATRIST

## 2023-09-13 NOTE — PROGRESS NOTES
[address] [facility] [address]  [place] [address]  Phone: [phone]   [phone]    Patient's PCP:Stefano Lopez MD  Referring Provider: Aaarefclayton Self    Subjective:      Chief Complaint:: Wound Care, Wound Consult, Diabetic Foot Ulcer (left plantar midfoot foot diabetic foot ulcer and left medial plantar diabetic foot ulcer), Wound Check, Non-healing Wound, Pressure Ulcer, Numbness, Peripheral Neuropathy, Foot Ulcer, Difficulty Walking, and Diabetes    HPI  Leanna García is a 57 y.o. female who presents today with left plantar midfoot foot diabetic foot ulcer and left medial plantar diabetic foot ulcer.  See wound docs documentation for full assessment and evaluation of all wounds.         Additionally, patient presents to clinic today for evaluation and treatment of diabetic feet.         Reviewed all notes from patients medical chart from last several months, including imaging, procedures, studies, progress notes,  cultures, antibiotic regimens, photos,  etc.      Debridement of left medial plantar diabetic foot ulcer.  Evaluation and Assessment only of original left plantar midfoot diabetic foot ulcer.  See Wound Docs for assessment of wounds and procedure notes  2. Continue taking all medications as prescribed  3. Dressing changes, see Wound docs for dressing change orders  4. RTC one week   5. Counseled patient on increasing protein intake, not getting wound wet, keeping dressing clean dry and intact, following a healthy diet, elevating legs when able, removing pressure from wound           Proper ulcer care and the possible need for serial debridements, topical medications, specific dressings and biological engineered skin substitutes if indicated.        Counseling/Education:  I provided patient education verbally regarding:   The aspects of diabetes and how it pertains to the feet. I explained the importance of proper diabetic foot care and how it is essential for the health of their  feet.     I discussed the importance of knowing their Hemoglobin A1c and that the level needs to be as close to 6 as possible. I discussed the increase complications of high blood sugar including stroke, blindness, heart attack, kidney failure and loss of limb secondary to neuropathy and PVD.      With neuropathy, beware of any breaks in the skin or redness. These areas are not recognized early due to the numbness.     I discussed Diabetes, lower back issues, metabolic disorders, systemic causes, chemotherapy, vitamin deficiency, heavy metal exposure, as some of the causes. I also explained that as much as 40% of the time we can not find a cause. I discussed different treatments available to control the symptoms but which may not cure the problem.         Counseled patient on the aspects of diabetes and how it pertains to the feet.  I explained the importance of proper diabetic foot care and how it is essential for the health of their feet.        Shoe inspection. Patient instructed on proper foot hygeine. We discussed wearing proper shoe gear, daily foot inspections, never walking without protective shoe gear, never putting sharp instruments to feet, routine podiatric nail visits every 2-3 months.          Patient should call the office immediately if any signs of infection, such as fever, chills, sweats, increased redness or pain.     Patient was instructed to call the clinic or go to the emergency department if their symptoms do not improve, worsens, or if new symptoms develop.  Patient was advised that if any increased swelling, pain, or numbness arise to go immediately to the ED. Patient knows to call any time if an emergency arises. Shared decision making occurred and patient verbalized understanding in agreement with this plan.         >50% of this > 45 minute visit was spent face to face educating/counseling the patient     I spent a total of 45 minutes on the day of the visit.This includes face to face time  and non-face to face time preparing to see the patient (eg, review of tests), obtaining and/or reviewing separately obtained history, documenting clinical information in the electronic or other health record, independently interpreting results and communicating results to the patient/family/caregiver, or care coordinator.        Much of the documentation for this visit was completed in the Wound Docs system.  Please see the attached documentation for further details about the patient's care. Scanned under the Media tab.         Alivia Bruno DPM        Vitals:    09/12/23 0959   Resp: 16   Temp: 98.7 °F (37.1 °C)   PainSc: 0-No pain      Shoe Size:     Past Surgical History:   Procedure Laterality Date    ANGIOGRAPHY OF LOWER EXTREMITY Left 10/18/2022    Procedure: Angiogram Extremity Unilateral;  Surgeon: Ali Khoobehi, MD;  Location: [facility] CATH/EP LAB;  Service: Vascular;  Laterality: Left;    AV FISTULA PLACEMENT Left 8/29/2022    Procedure: CREATION, AV FISTULA;  Surgeon: Ali Khoobehi, MD;  Location: [facility] OR;  Service: Vascular;  Laterality: Left;    BONE BIOPSY Left 8/24/2022    Procedure: Biopsy-Bone;  Surgeon: Porfirio Ponce DPM;  Location: [facility] OR;  Service: Podiatry;  Laterality: Left;    COLONOSCOPY N/A 10/5/2016    Procedure: COLONOSCOPY;  Surgeon: Pillo Chanel MD;  Location: [facility] ENDO;  Service: Endoscopy;  Laterality: N/A;    COLONOSCOPY N/A 4/26/2022    Procedure: COLONOSCOPY;  Surgeon: Saravaann Rachel MD;  Location: [facility] ENDO;  Service: Endoscopy;  Laterality: N/A;    FISTULOGRAM Left 8/24/2022    Procedure: Fistulogram;  Surgeon: Ali Khoobehi, MD;  Location: [facility] CATH/EP LAB;  Service: Vascular;  Laterality: Left;    HERNIA REPAIR Bilateral 11/22/2016    inguinal    HYSTERECTOMY      INCISION AND DRAINAGE FOOT Left 5/13/2022    Procedure: INCISION AND DRAINAGE, FOOT;  Surgeon: Ricardo Bruno DPM;  Location: [facility] OR;  Service: Podiatry;  Laterality: Left;    OOPHORECTOMY      THROMBECTOMY,  AV FISTULA, UPPER EXTREMITY, PERCUTANEOUS  8/24/2022    Procedure: THROMBECTOMY, AV FISTULA, UPPER EXTREMITY, PERCUTANEOUS;  Surgeon: Ali Khoobehi, MD;  Location: [facility] CATH/EP LAB;  Service: Vascular;;    TOE AMPUTATION Left 8/26/2022    Procedure: AMPUTATION, TOE;  Surgeon: Porfirio Ponce DPM;  Location: [facility] OR;  Service: Podiatry;  Laterality: Left;     Past Medical History:   Diagnosis Date    A-fib     resolved per patient    Anemia due to end stage renal disease 6/30/2022    Arthritis     Bronchitis     Cardiovascular event risk, ASCVD 10-year risk 6.7% 10/15/2016    Diabetes mellitus type II     Diabetic foot infection     Diabetic ulcer of left midfoot 05/05/2022    Disorder of kidney and ureter     Encounter for blood transfusion     ESRD (end stage renal disease) 05/18/2018    MANJINDER (generalized anxiety disorder) 10/25/2016    History of colon polyps 11/02/2016    Hyperlipidemia     Hypertension     Stroke      Family History   Problem Relation Age of Onset    Hyperlipidemia Mother     Hypertension Mother     Hypertension Father     Diabetes Father     Diabetes Sister     Diabetes Sister     Diabetes Maternal Aunt     Diabetes Maternal Grandmother     Heart disease Maternal Grandmother     Cancer Paternal Grandmother     Breast cancer Neg Hx     Colon cancer Neg Hx     Ovarian cancer Neg Hx         Social History:   Marital Status:   Alcohol History:  reports no history of alcohol use.  Tobacco History:  reports that she has never smoked. She has never used smokeless tobacco.  Drug History:  reports no history of drug use.    Review of patient's allergies indicates:   Allergen Reactions    Dilaudid [hydromorphone] Nausea And Vomiting    Chlorhexidine Itching    Lortab [hydrocodone-acetaminophen] Itching       Current Outpatient Medications   Medication Sig Dispense Refill    acetaminophen (TYLENOL) 325 MG tablet Take 325 mg by mouth every 6 (six) hours.      ALPRAZolam (XANAX) 0.5 MG tablet Take  1 tablet by mouth.      atorvastatin (LIPITOR) 80 MG tablet Take 1 tablet (80 mg total) by mouth once daily. 90 tablet 3    azelastine (ASTELIN) 137 mcg (0.1 %) nasal spray 1 spray (137 mcg total) by Nasal route 2 (two) times daily. 30 mL 3    blood sugar diagnostic Strp To check BG 3 times daily, to use with insurance preferred meter 100 strip 1    blood sugar diagnostic Strp To check BG 4 times daily, to use with insurance preferred meter 450 strip 3    blood sugar diagnostic Strp To check BG 1 times daily, to use with insurance preferred meter 100 each 11    blood-glucose meter kit To check BG 3 times daily, to use with insurance preferred meter 1 each 0    blood-glucose meter kit To check BG 3 times daily, to use with insurance preferred meter 1 each 0    cetirizine (ZYRTEC) 10 MG tablet Take 1 tablet (10 mg total) by mouth every other day. 45 tablet 3    clopidogreL (PLAVIX) 75 mg tablet Take 1 tablet (75 mg total) by mouth once daily. 90 tablet 3    doxycycline (MONODOX) 100 MG capsule Take 1 capsule (100 mg total) by mouth 2 (two) times daily. 20 capsule 0    doxycycline (VIBRAMYCIN) 100 MG Cap Take 1 capsule (100 mg total) by mouth 2 (two) times daily. for 14 days 28 capsule 0    epoetin chris-epbx (RETACRIT) 4,000 unit/mL injection Inject 1.11 mLs (4,440 Units total) into the skin every Mon, Wed, Fri.      EScitalopram oxalate (LEXAPRO) 10 MG tablet Take 1 tablet (10 mg total) by mouth once daily. 30 tablet 11    fluticasone propionate (FLONASE) 50 mcg/actuation nasal spray 2 sprays (100 mcg total) by Each Nostril route once daily. 16 g 3    gabapentin (NEURONTIN) 100 MG capsule Take 1 capsule (100 mg total) by mouth 2 (two) times daily. 180 capsule 3    hydrALAZINE (APRESOLINE) 25 MG tablet Take 1 tablet (25 mg total) by mouth 2 (two) times daily as needed (Systolic blood pressure > 170 mm Hg).      lancets Misc To check BG 3 times daily, to use with insurance preferred meter 100 each 1    lancets Misc To  "check BG 1 times daily, to use with insurance preferred meter 100 each 11    minoxidiL (LONITEN) 10 MG Tab Take 10 mg by mouth 2 (two) times daily.      multivitamin (THERAGRAN) per tablet Take 1 tablet by mouth.      pen needle, diabetic (BD ULTRA-FINE ROBERT PEN NEEDLE) 32 gauge x 5/32" Ndle 1 pen by Misc.(Non-Drug; Combo Route) route 4 (four) times daily. To use 4 times per day with insulin injections. 100 each 1    sucroferric oxyhydroxide (VELPHORO) 500 mg Chew Take 3 tablets by mouth.       No current facility-administered medications for this visit.       Review of Systems   Constitutional:  Negative for chills, fatigue, fever and unexpected weight change.   HENT:  Negative for hearing loss and trouble swallowing.    Eyes:  Negative for photophobia and visual disturbance.   Respiratory:  Negative for cough, shortness of breath and wheezing.    Cardiovascular:  Negative for chest pain, palpitations and leg swelling.   Gastrointestinal:  Negative for abdominal pain and nausea.   Genitourinary:  Negative for dysuria and frequency.   Musculoskeletal:  Positive for gait problem. Negative for arthralgias, back pain, joint swelling and myalgias.   Skin:  Positive for wound. Negative for rash.   Neurological:  Positive for numbness. Negative for tremors, seizures, weakness and headaches.   Hematological:  Does not bruise/bleed easily.         Objective:        Physical Exam:   Foot Exam  Physical Exam  Ortho/SPM Exam     Imaging:            Assessment:       1. Ulcer of left foot with fat layer exposed    2. History of foot ulcer    3. 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 ESRD (end stage renal disease)    5. Peripheral vascular disease    6. Type 2 diabetes mellitus with hypoglycemia and coma, with long-term current use of insulin    7. At risk for readmission to hospital    8. Bilateral lower extremity edema    9. Diabetic foot infection    10. History of TIA (transient ischemic attack)    11. " Encounter for diabetic foot exam    12. At high risk for inadequate nutritional intake    13. History of amputation of left foot    14. Type 2 diabetes mellitus with chronic kidney disease on chronic dialysis, with long-term current use of insulin    15. Difficulty in walking, not elsewhere classified    16. Decreased pedal pulses    17. Ulcer of foot, unspecified laterality, unspecified ulcer stage    18. Hypertension associated with diabetes    19. Comprehensive diabetic foot examination, type 2 DM, encounter for    20. Onychogryphosis    21. Diabetic ulcer of left midfoot associated with type 2 diabetes mellitus, with necrosis of muscle      Plan:   Ulcer of left foot with fat layer exposed    History of foot ulcer    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    ESRD (end stage renal disease)    Peripheral vascular disease    Type 2 diabetes mellitus with hypoglycemia and coma, with long-term current use of insulin    At risk for readmission to hospital    Bilateral lower extremity edema    Diabetic foot infection    History of TIA (transient ischemic attack)    Encounter for diabetic foot exam    At high risk for inadequate nutritional intake    History of amputation of left foot    Type 2 diabetes mellitus with chronic kidney disease on chronic dialysis, with long-term current use of insulin    Difficulty in walking, not elsewhere classified    Decreased pedal pulses    Ulcer of foot, unspecified laterality, unspecified ulcer stage    Hypertension associated with diabetes    Comprehensive diabetic foot examination, type 2 DM, encounter for    Onychogryphosis    Diabetic ulcer of left midfoot associated with type 2 diabetes mellitus, with necrosis of muscle      Follow up in about 2 weeks (around 9/26/2023).    Procedures          Counseling:     I provided patient education verbally regarding:   Patient diagnosis, treatment options, as well as alternatives, risks, and benefits.     This note was  created using Dragon voice recognition software that occasionally misinterpreted phrases or words.

## 2023-09-14 ENCOUNTER — PATIENT MESSAGE (OUTPATIENT)
Dept: ADMINISTRATIVE | Facility: HOSPITAL | Age: 58
End: 2023-09-14
Payer: MEDICARE

## 2023-09-19 ENCOUNTER — OFFICE VISIT (OUTPATIENT)
Dept: WOUND CARE | Facility: HOSPITAL | Age: 58
End: 2023-09-19
Attending: PODIATRIST
Payer: MEDICARE

## 2023-09-19 VITALS
HEART RATE: 87 BPM | DIASTOLIC BLOOD PRESSURE: 100 MMHG | TEMPERATURE: 98 F | RESPIRATION RATE: 18 BRPM | SYSTOLIC BLOOD PRESSURE: 161 MMHG

## 2023-09-19 DIAGNOSIS — N18.6 TYPE 2 DIABETES MELLITUS WITH CHRONIC KIDNEY DISEASE ON CHRONIC DIALYSIS, WITH LONG-TERM CURRENT USE OF INSULIN: ICD-10-CM

## 2023-09-19 DIAGNOSIS — Z91.89 AT HIGH RISK FOR INADEQUATE NUTRITIONAL INTAKE: ICD-10-CM

## 2023-09-19 DIAGNOSIS — L08.9 DIABETIC FOOT INFECTION: ICD-10-CM

## 2023-09-19 DIAGNOSIS — N18.6 ESRD (END STAGE RENAL DISEASE): ICD-10-CM

## 2023-09-19 DIAGNOSIS — E11.641 TYPE 2 DIABETES MELLITUS WITH HYPOGLYCEMIA AND COMA, WITH LONG-TERM CURRENT USE OF INSULIN: ICD-10-CM

## 2023-09-19 DIAGNOSIS — Z91.89 AT RISK FOR READMISSION TO HOSPITAL: ICD-10-CM

## 2023-09-19 DIAGNOSIS — Z99.2 TYPE 2 DIABETES MELLITUS WITH CHRONIC KIDNEY DISEASE ON CHRONIC DIALYSIS, WITH LONG-TERM CURRENT USE OF INSULIN: ICD-10-CM

## 2023-09-19 DIAGNOSIS — L97.522 ULCER OF LEFT FOOT WITH FAT LAYER EXPOSED: Primary | ICD-10-CM

## 2023-09-19 DIAGNOSIS — E11.22 TYPE 2 DIABETES MELLITUS WITH CHRONIC KIDNEY DISEASE ON CHRONIC DIALYSIS, WITH LONG-TERM CURRENT USE OF INSULIN: ICD-10-CM

## 2023-09-19 DIAGNOSIS — Z79.4 TYPE 2 DIABETES MELLITUS WITH CHRONIC KIDNEY DISEASE ON CHRONIC DIALYSIS, WITH LONG-TERM CURRENT USE OF INSULIN: ICD-10-CM

## 2023-09-19 DIAGNOSIS — R60.0 BILATERAL LOWER EXTREMITY EDEMA: ICD-10-CM

## 2023-09-19 DIAGNOSIS — R26.2 DIFFICULTY IN WALKING, NOT ELSEWHERE CLASSIFIED: ICD-10-CM

## 2023-09-19 DIAGNOSIS — I15.2 HYPERTENSION ASSOCIATED WITH DIABETES: ICD-10-CM

## 2023-09-19 DIAGNOSIS — B35.3 TINEA PEDIS OF BOTH FEET: ICD-10-CM

## 2023-09-19 DIAGNOSIS — Z86.73 HISTORY OF TIA (TRANSIENT ISCHEMIC ATTACK): ICD-10-CM

## 2023-09-19 DIAGNOSIS — E11.649 TYPE 2 DIABETES MELLITUS WITH HYPOGLYCEMIA UNAWARENESS: ICD-10-CM

## 2023-09-19 DIAGNOSIS — L60.2 ONYCHOGRYPHOSIS: ICD-10-CM

## 2023-09-19 DIAGNOSIS — E11.59 HYPERTENSION ASSOCIATED WITH DIABETES: ICD-10-CM

## 2023-09-19 DIAGNOSIS — Z87.2 HISTORY OF FOOT ULCER: ICD-10-CM

## 2023-09-19 DIAGNOSIS — E11.9 ENCOUNTER FOR DIABETIC FOOT EXAM: ICD-10-CM

## 2023-09-19 DIAGNOSIS — I73.9 PERIPHERAL VASCULAR DISEASE: ICD-10-CM

## 2023-09-19 DIAGNOSIS — Z79.4 TYPE 2 DIABETES MELLITUS WITH HYPOGLYCEMIA AND COMA, WITH LONG-TERM CURRENT USE OF INSULIN: ICD-10-CM

## 2023-09-19 DIAGNOSIS — E11.628 DIABETIC FOOT INFECTION: ICD-10-CM

## 2023-09-19 DIAGNOSIS — R09.89 DECREASED PEDAL PULSES: ICD-10-CM

## 2023-09-19 DIAGNOSIS — Z89.432 HISTORY OF AMPUTATION OF LEFT FOOT: ICD-10-CM

## 2023-09-19 PROCEDURE — 99214 OFFICE O/P EST MOD 30 MIN: CPT | Mod: 25,,, | Performed by: PODIATRIST

## 2023-09-19 PROCEDURE — 3080F DIAST BP >= 90 MM HG: CPT | Mod: CPTII,,, | Performed by: PODIATRIST

## 2023-09-19 PROCEDURE — 1160F RVW MEDS BY RX/DR IN RCRD: CPT | Mod: CPTII,,, | Performed by: PODIATRIST

## 2023-09-19 PROCEDURE — 4010F PR ACE/ARB THEARPY RXD/TAKEN: ICD-10-PCS | Mod: CPTII,,, | Performed by: PODIATRIST

## 2023-09-19 PROCEDURE — 3077F SYST BP >= 140 MM HG: CPT | Mod: CPTII,,, | Performed by: PODIATRIST

## 2023-09-19 PROCEDURE — 11042 DBRDMT SUBQ TIS 1ST 20SQCM/<: CPT | Mod: ,,, | Performed by: PODIATRIST

## 2023-09-19 PROCEDURE — 11042 DBRDMT SUBQ TIS 1ST 20SQCM/<: CPT | Performed by: PODIATRIST

## 2023-09-19 PROCEDURE — 3052F PR MOST RECENT HEMOGLOBIN A1C LEVEL 8.0 - < 9.0%: ICD-10-PCS | Mod: CPTII,,, | Performed by: PODIATRIST

## 2023-09-19 PROCEDURE — 3080F PR MOST RECENT DIASTOLIC BLOOD PRESSURE >= 90 MM HG: ICD-10-PCS | Mod: CPTII,,, | Performed by: PODIATRIST

## 2023-09-19 PROCEDURE — 1159F PR MEDICATION LIST DOCUMENTED IN MEDICAL RECORD: ICD-10-PCS | Mod: CPTII,,, | Performed by: PODIATRIST

## 2023-09-19 PROCEDURE — 11042 PR DEBRIDEMENT, SKIN, SUB-Q TISSUE,=<20 SQ CM: ICD-10-PCS | Mod: ,,, | Performed by: PODIATRIST

## 2023-09-19 PROCEDURE — 4010F ACE/ARB THERAPY RXD/TAKEN: CPT | Mod: CPTII,,, | Performed by: PODIATRIST

## 2023-09-19 PROCEDURE — 1160F PR REVIEW ALL MEDS BY PRESCRIBER/CLIN PHARMACIST DOCUMENTED: ICD-10-PCS | Mod: CPTII,,, | Performed by: PODIATRIST

## 2023-09-19 PROCEDURE — 3052F HG A1C>EQUAL 8.0%<EQUAL 9.0%: CPT | Mod: CPTII,,, | Performed by: PODIATRIST

## 2023-09-19 PROCEDURE — 3077F PR MOST RECENT SYSTOLIC BLOOD PRESSURE >= 140 MM HG: ICD-10-PCS | Mod: CPTII,,, | Performed by: PODIATRIST

## 2023-09-19 PROCEDURE — 1159F MED LIST DOCD IN RCRD: CPT | Mod: CPTII,,, | Performed by: PODIATRIST

## 2023-09-19 PROCEDURE — 99214 PR OFFICE/OUTPT VISIT, EST, LEVL IV, 30-39 MIN: ICD-10-PCS | Mod: 25,,, | Performed by: PODIATRIST

## 2023-09-19 NOTE — PROGRESS NOTES
[address] [facility] [address]  [place] [address]  Phone: [phone]   [phone]    Patient's PCP:Stefano Lopez MD  Referring Provider: Aaareferral Self    Subjective:      Chief Complaint:: Wound Care, Diabetic Foot Ulcer, Non-healing Wound Follow Up, Wound Check, Non-healing Wound, Pressure Ulcer, Numbness, Peripheral Neuropathy, Tinea Pedis, Difficulty Walking, Foot Ulcer, and Diabetes    HPI  Leanna García is a 57 y.o. female who presents today with left plantar midfoot foot diabetic foot ulcer and left medial plantar diabetic foot ulcer.  See wound docs documentation for full assessment and evaluation of all wounds.         Additionally, patient presents to clinic today for evaluation and treatment of diabetic feet.     Additionally, patient presents with dry skin bilateral feet.   Fungal infection of skin explained. Treatment options including no treatment, topical medications, oral medications were discussed, as well as success rates and risks of recurrence. We agreed on topical medication       Athlete's foot counseling: Counseled patient that it is important to keep the feet dry. Use absorbent cotton socks and change them if they become sweaty. Or wear an open-toe shoe or sandal. Wash the feet at least once a day with soap and water. Apply the antifungal cream as prescribed. Recommend spray antiperspirant to the feet. Some antifungal creams are available without a prescription. It may take a week before the rash starts to improve. It can take about 3 to 4 weeks to completely clear. Continue the medicine until the rash is all gone. Use over-the-counter antifungal powders or sprays on your feet after exposure to high-risk environments, such as public showers, gyms, and locker rooms. This can help prevent future infections. Wearing appropriate shoes in these situations can help.        Reviewed all notes from patients medical chart from last several months, including imaging, procedures,  studies, progress notes,  cultures, antibiotic regimens, photos,  etc.      Debridement of left medial plantar diabetic foot ulcer.  Evaluation and Assessment only of original left plantar midfoot diabetic foot ulcer.  See Wound Docs for assessment of wounds and procedure notes  2. Continue taking all medications as prescribed  3. Dressing changes, see Wound docs for dressing change orders  4. RTC one week   5. Counseled patient on increasing protein intake, not getting wound wet, keeping dressing clean dry and intact, following a healthy diet, elevating legs when able, removing pressure from wound           Proper ulcer care and the possible need for serial debridements, topical medications, specific dressings and biological engineered skin substitutes if indicated.        Counseling/Education:  I provided patient education verbally regarding:   The aspects of diabetes and how it pertains to the feet. I explained the importance of proper diabetic foot care and how it is essential for the health of their feet.     I discussed the importance of knowing their Hemoglobin A1c and that the level needs to be as close to 6 as possible. I discussed the increase complications of high blood sugar including stroke, blindness, heart attack, kidney failure and loss of limb secondary to neuropathy and PVD.      With neuropathy, beware of any breaks in the skin or redness. These areas are not recognized early due to the numbness.     I discussed Diabetes, lower back issues, metabolic disorders, systemic causes, chemotherapy, vitamin deficiency, heavy metal exposure, as some of the causes. I also explained that as much as 40% of the time we can not find a cause. I discussed different treatments available to control the symptoms but which may not cure the problem.         Counseled patient on the aspects of diabetes and how it pertains to the feet.  I explained the importance of proper diabetic foot care and how it is essential for  the health of their feet.        Shoe inspection. Patient instructed on proper foot hygeine. We discussed wearing proper shoe gear, daily foot inspections, never walking without protective shoe gear, never putting sharp instruments to feet, routine podiatric nail visits every 2-3 months.          Patient should call the office immediately if any signs of infection, such as fever, chills, sweats, increased redness or pain.     Patient was instructed to call the clinic or go to the emergency department if their symptoms do not improve, worsens, or if new symptoms develop.  Patient was advised that if any increased swelling, pain, or numbness arise to go immediately to the ED. Patient knows to call any time if an emergency arises. Shared decision making occurred and patient verbalized understanding in agreement with this plan.         >50% of this > 45 minute visit was spent face to face educating/counseling the patient     I spent a total of 45 minutes on the day of the visit.This includes face to face time and non-face to face time preparing to see the patient (eg, review of tests), obtaining and/or reviewing separately obtained history, documenting clinical information in the electronic or other health record, independently interpreting results and communicating results to the patient/family/caregiver, or care coordinator.        Much of the documentation for this visit was completed in the Wound Docs system.  Please see the attached documentation for further details about the patient's care. Scanned under the Media tab.         Alivia Bruno DPM        Vitals:    09/19/23 1041   BP: (!) 161/100   Pulse: 87   Resp: 18   Temp: 98.3 °F (36.8 °C)   PainSc: 0-No pain      Shoe Size:     Past Surgical History:   Procedure Laterality Date    ANGIOGRAPHY OF LOWER EXTREMITY Left 10/18/2022    Procedure: Angiogram Extremity Unilateral;  Surgeon: Ali Khoobehi, MD;  Location: [facility] CATH/EP LAB;  Service: Vascular;  Laterality:  Left;    AV FISTULA PLACEMENT Left 8/29/2022    Procedure: CREATION, AV FISTULA;  Surgeon: Ali Khoobehi, MD;  Location: [facility] OR;  Service: Vascular;  Laterality: Left;    BONE BIOPSY Left 8/24/2022    Procedure: Biopsy-Bone;  Surgeon: Porfirio Ponce DPM;  Location: [facility] OR;  Service: Podiatry;  Laterality: Left;    COLONOSCOPY N/A 10/5/2016    Procedure: COLONOSCOPY;  Surgeon: Pillo Chanel MD;  Location: [facility] ENDO;  Service: Endoscopy;  Laterality: N/A;    COLONOSCOPY N/A 4/26/2022    Procedure: COLONOSCOPY;  Surgeon: Saravanan Rachel MD;  Location: [facility] ENDO;  Service: Endoscopy;  Laterality: N/A;    FISTULOGRAM Left 8/24/2022    Procedure: Fistulogram;  Surgeon: Ali Khoobehi, MD;  Location: [facility] CATH/EP LAB;  Service: Vascular;  Laterality: Left;    HERNIA REPAIR Bilateral 11/22/2016    inguinal    HYSTERECTOMY      INCISION AND DRAINAGE FOOT Left 5/13/2022    Procedure: INCISION AND DRAINAGE, FOOT;  Surgeon: Ricardo Bruno DPM;  Location: [facility] OR;  Service: Podiatry;  Laterality: Left;    OOPHORECTOMY      THROMBECTOMY, AV FISTULA, UPPER EXTREMITY, PERCUTANEOUS  8/24/2022    Procedure: THROMBECTOMY, AV FISTULA, UPPER EXTREMITY, PERCUTANEOUS;  Surgeon: Ali Khoobehi, MD;  Location: [facility] CATH/EP LAB;  Service: Vascular;;    TOE AMPUTATION Left 8/26/2022    Procedure: AMPUTATION, TOE;  Surgeon: Porfirio Ponce DPM;  Location: [facility] OR;  Service: Podiatry;  Laterality: Left;     Past Medical History:   Diagnosis Date    A-fib     resolved per patient    Anemia due to end stage renal disease 6/30/2022    Arthritis     Bronchitis     Cardiovascular event risk, ASCVD 10-year risk 6.7% 10/15/2016    Diabetes mellitus type II     Diabetic foot infection     Diabetic ulcer of left midfoot 05/05/2022    Disorder of kidney and ureter     Encounter for blood transfusion     ESRD (end stage renal disease) 05/18/2018    MANJINDER (generalized anxiety disorder) 10/25/2016    History of colon polyps 11/02/2016     Hyperlipidemia     Hypertension     Stroke      Family History   Problem Relation Age of Onset    Hyperlipidemia Mother     Hypertension Mother     Hypertension Father     Diabetes Father     Diabetes Sister     Diabetes Sister     Diabetes Maternal Aunt     Diabetes Maternal Grandmother     Heart disease Maternal Grandmother     Cancer Paternal Grandmother     Breast cancer Neg Hx     Colon cancer Neg Hx     Ovarian cancer Neg Hx         Social History:   Marital Status:   Alcohol History:  reports no history of alcohol use.  Tobacco History:  reports that she has never smoked. She has never used smokeless tobacco.  Drug History:  reports no history of drug use.    Review of patient's allergies indicates:   Allergen Reactions    Dilaudid [hydromorphone] Nausea And Vomiting    Chlorhexidine Itching    Lortab [hydrocodone-acetaminophen] Itching       Current Outpatient Medications   Medication Sig Dispense Refill    acetaminophen (TYLENOL) 325 MG tablet Take 325 mg by mouth every 6 (six) hours.      ALPRAZolam (XANAX) 0.5 MG tablet Take 1 tablet by mouth.      atorvastatin (LIPITOR) 80 MG tablet Take 1 tablet (80 mg total) by mouth once daily. 90 tablet 3    azelastine (ASTELIN) 137 mcg (0.1 %) nasal spray 1 spray (137 mcg total) by Nasal route 2 (two) times daily. 30 mL 3    blood sugar diagnostic Strp To check BG 3 times daily, to use with insurance preferred meter 100 strip 1    blood sugar diagnostic Strp To check BG 4 times daily, to use with insurance preferred meter 450 strip 3    blood sugar diagnostic Strp To check BG 1 times daily, to use with insurance preferred meter 100 each 11    blood-glucose meter kit To check BG 3 times daily, to use with insurance preferred meter 1 each 0    blood-glucose meter kit To check BG 3 times daily, to use with insurance preferred meter 1 each 0    cetirizine (ZYRTEC) 10 MG tablet Take 1 tablet (10 mg total) by mouth every other day. 45 tablet 3    clopidogreL  "(PLAVIX) 75 mg tablet Take 1 tablet (75 mg total) by mouth once daily. 90 tablet 3    doxycycline (MONODOX) 100 MG capsule Take 1 capsule (100 mg total) by mouth 2 (two) times daily. 20 capsule 0    epoetin chris-epbx (RETACRIT) 4,000 unit/mL injection Inject 1.11 mLs (4,440 Units total) into the skin every Mon, Wed, Fri.      EScitalopram oxalate (LEXAPRO) 10 MG tablet Take 1 tablet (10 mg total) by mouth once daily. 30 tablet 11    fluticasone propionate (FLONASE) 50 mcg/actuation nasal spray 2 sprays (100 mcg total) by Each Nostril route once daily. 16 g 3    gabapentin (NEURONTIN) 100 MG capsule Take 1 capsule (100 mg total) by mouth 2 (two) times daily. 180 capsule 3    hydrALAZINE (APRESOLINE) 25 MG tablet Take 1 tablet (25 mg total) by mouth 2 (two) times daily as needed (Systolic blood pressure > 170 mm Hg).      lancets Misc To check BG 3 times daily, to use with insurance preferred meter 100 each 1    lancets Misc To check BG 1 times daily, to use with insurance preferred meter 100 each 11    minoxidiL (LONITEN) 10 MG Tab Take 10 mg by mouth 2 (two) times daily.      multivitamin (THERAGRAN) per tablet Take 1 tablet by mouth.      pen needle, diabetic (BD ULTRA-FINE ROBERT PEN NEEDLE) 32 gauge x 5/32" Ndle 1 pen by Misc.(Non-Drug; Combo Route) route 4 (four) times daily. To use 4 times per day with insulin injections. 100 each 1    sucroferric oxyhydroxide (VELPHORO) 500 mg Chew Take 3 tablets by mouth.       No current facility-administered medications for this visit.       Review of Systems   Constitutional:  Negative for chills, fatigue, fever and unexpected weight change.   HENT:  Negative for hearing loss and trouble swallowing.    Eyes:  Negative for photophobia and visual disturbance.   Respiratory:  Negative for cough, shortness of breath and wheezing.    Cardiovascular:  Negative for chest pain, palpitations and leg swelling.   Gastrointestinal:  Negative for abdominal pain and nausea. "   Genitourinary:  Negative for dysuria and frequency.   Musculoskeletal:  Positive for gait problem. Negative for arthralgias, back pain, joint swelling and myalgias.   Skin:  Positive for wound. Negative for rash.   Neurological:  Positive for numbness. Negative for tremors, seizures, weakness and headaches.   Hematological:  Does not bruise/bleed easily.         Objective:        Physical Exam:   Foot Exam  Physical Exam  Ortho/SPM Exam     Imaging:            Assessment:       1. Ulcer of left foot with fat layer exposed    2. History of foot ulcer    3. 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 Type 2 diabetes mellitus with hypoglycemia and coma, with long-term current use of insulin    5. ESRD (end stage renal disease)    6. Peripheral vascular disease    7. Bilateral lower extremity edema    8. At risk for readmission to hospital    9. Diabetic foot infection    10. At high risk for inadequate nutritional intake    11. Difficulty in walking, not elsewhere classified    12. Type 2 diabetes mellitus with chronic kidney disease on chronic dialysis, with long-term current use of insulin    13. Encounter for diabetic foot exam    14. History of TIA (transient ischemic attack)    15. History of amputation of left foot    16. Decreased pedal pulses    17. Hypertension associated with diabetes    18. Onychogryphosis    19. Tinea pedis of both feet      Plan:   Ulcer of left foot with fat layer exposed    History of foot ulcer    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    Type 2 diabetes mellitus with hypoglycemia and coma, with long-term current use of insulin    ESRD (end stage renal disease)    Peripheral vascular disease    Bilateral lower extremity edema    At risk for readmission to hospital    Diabetic foot infection    At high risk for inadequate nutritional intake    Difficulty in walking, not elsewhere classified    Type 2 diabetes mellitus with chronic kidney disease on  chronic dialysis, with long-term current use of insulin    Encounter for diabetic foot exam    History of TIA (transient ischemic attack)    History of amputation of left foot    Decreased pedal pulses    Hypertension associated with diabetes    Onychogryphosis    Tinea pedis of both feet      Follow up in about 1 week (around 9/26/2023).    Procedures          Counseling:     I provided patient education verbally regarding:   Patient diagnosis, treatment options, as well as alternatives, risks, and benefits.     This note was created using Dragon voice recognition software that occasionally misinterpreted phrases or words.

## 2023-09-26 ENCOUNTER — OFFICE VISIT (OUTPATIENT)
Dept: WOUND CARE | Facility: HOSPITAL | Age: 58
End: 2023-09-26
Attending: PODIATRIST
Payer: MEDICARE

## 2023-09-26 VITALS
RESPIRATION RATE: 18 BRPM | HEIGHT: 68 IN | WEIGHT: 174.19 LBS | TEMPERATURE: 98 F | HEART RATE: 76 BPM | DIASTOLIC BLOOD PRESSURE: 88 MMHG | SYSTOLIC BLOOD PRESSURE: 129 MMHG | BODY MASS INDEX: 26.4 KG/M2

## 2023-09-26 DIAGNOSIS — Z86.73 HISTORY OF TIA (TRANSIENT ISCHEMIC ATTACK): ICD-10-CM

## 2023-09-26 DIAGNOSIS — E11.649 TYPE 2 DIABETES MELLITUS WITH HYPOGLYCEMIA UNAWARENESS: ICD-10-CM

## 2023-09-26 DIAGNOSIS — L60.2 ONYCHOGRYPHOSIS: ICD-10-CM

## 2023-09-26 DIAGNOSIS — N18.6 ESRD (END STAGE RENAL DISEASE): ICD-10-CM

## 2023-09-26 DIAGNOSIS — N18.6 TYPE 2 DIABETES MELLITUS WITH CHRONIC KIDNEY DISEASE ON CHRONIC DIALYSIS, WITH LONG-TERM CURRENT USE OF INSULIN: ICD-10-CM

## 2023-09-26 DIAGNOSIS — I73.9 PERIPHERAL VASCULAR DISEASE: ICD-10-CM

## 2023-09-26 DIAGNOSIS — L97.522 ULCER OF LEFT FOOT WITH FAT LAYER EXPOSED: Primary | ICD-10-CM

## 2023-09-26 DIAGNOSIS — E11.628 DIABETIC FOOT INFECTION: ICD-10-CM

## 2023-09-26 DIAGNOSIS — L08.9 DIABETIC FOOT INFECTION: ICD-10-CM

## 2023-09-26 DIAGNOSIS — E11.641 TYPE 2 DIABETES MELLITUS WITH HYPOGLYCEMIA AND COMA, WITH LONG-TERM CURRENT USE OF INSULIN: ICD-10-CM

## 2023-09-26 DIAGNOSIS — Z87.2 HISTORY OF FOOT ULCER: ICD-10-CM

## 2023-09-26 DIAGNOSIS — Z79.4 TYPE 2 DIABETES MELLITUS WITH HYPOGLYCEMIA AND COMA, WITH LONG-TERM CURRENT USE OF INSULIN: ICD-10-CM

## 2023-09-26 DIAGNOSIS — Z99.2 TYPE 2 DIABETES MELLITUS WITH CHRONIC KIDNEY DISEASE ON CHRONIC DIALYSIS, WITH LONG-TERM CURRENT USE OF INSULIN: ICD-10-CM

## 2023-09-26 DIAGNOSIS — I15.2 HYPERTENSION ASSOCIATED WITH DIABETES: ICD-10-CM

## 2023-09-26 DIAGNOSIS — Z91.89 AT HIGH RISK FOR INADEQUATE NUTRITIONAL INTAKE: ICD-10-CM

## 2023-09-26 DIAGNOSIS — B35.3 TINEA PEDIS OF BOTH FEET: ICD-10-CM

## 2023-09-26 DIAGNOSIS — R09.89 DECREASED PEDAL PULSES: ICD-10-CM

## 2023-09-26 DIAGNOSIS — E11.9 ENCOUNTER FOR DIABETIC FOOT EXAM: ICD-10-CM

## 2023-09-26 DIAGNOSIS — E11.59 HYPERTENSION ASSOCIATED WITH DIABETES: ICD-10-CM

## 2023-09-26 DIAGNOSIS — Z91.89 AT RISK FOR READMISSION TO HOSPITAL: ICD-10-CM

## 2023-09-26 DIAGNOSIS — E11.22 TYPE 2 DIABETES MELLITUS WITH CHRONIC KIDNEY DISEASE ON CHRONIC DIALYSIS, WITH LONG-TERM CURRENT USE OF INSULIN: ICD-10-CM

## 2023-09-26 DIAGNOSIS — R26.2 DIFFICULTY IN WALKING, NOT ELSEWHERE CLASSIFIED: ICD-10-CM

## 2023-09-26 DIAGNOSIS — Z89.432 HISTORY OF AMPUTATION OF LEFT FOOT: ICD-10-CM

## 2023-09-26 DIAGNOSIS — Z79.4 TYPE 2 DIABETES MELLITUS WITH CHRONIC KIDNEY DISEASE ON CHRONIC DIALYSIS, WITH LONG-TERM CURRENT USE OF INSULIN: ICD-10-CM

## 2023-09-26 DIAGNOSIS — R60.0 BILATERAL LOWER EXTREMITY EDEMA: ICD-10-CM

## 2023-09-26 PROCEDURE — 3008F BODY MASS INDEX DOCD: CPT | Mod: CPTII,,, | Performed by: PODIATRIST

## 2023-09-26 PROCEDURE — 3074F PR MOST RECENT SYSTOLIC BLOOD PRESSURE < 130 MM HG: ICD-10-PCS | Mod: CPTII,,, | Performed by: PODIATRIST

## 2023-09-26 PROCEDURE — 4010F ACE/ARB THERAPY RXD/TAKEN: CPT | Mod: CPTII,,, | Performed by: PODIATRIST

## 2023-09-26 PROCEDURE — 1159F MED LIST DOCD IN RCRD: CPT | Mod: CPTII,,, | Performed by: PODIATRIST

## 2023-09-26 PROCEDURE — 3079F PR MOST RECENT DIASTOLIC BLOOD PRESSURE 80-89 MM HG: ICD-10-PCS | Mod: CPTII,,, | Performed by: PODIATRIST

## 2023-09-26 PROCEDURE — 1160F RVW MEDS BY RX/DR IN RCRD: CPT | Mod: CPTII,,, | Performed by: PODIATRIST

## 2023-09-26 PROCEDURE — 3008F PR BODY MASS INDEX (BMI) DOCUMENTED: ICD-10-PCS | Mod: CPTII,,, | Performed by: PODIATRIST

## 2023-09-26 PROCEDURE — 3074F SYST BP LT 130 MM HG: CPT | Mod: CPTII,,, | Performed by: PODIATRIST

## 2023-09-26 PROCEDURE — 3052F PR MOST RECENT HEMOGLOBIN A1C LEVEL 8.0 - < 9.0%: ICD-10-PCS | Mod: CPTII,,, | Performed by: PODIATRIST

## 2023-09-26 PROCEDURE — 99213 OFFICE O/P EST LOW 20 MIN: CPT | Mod: PN | Performed by: PODIATRIST

## 2023-09-26 PROCEDURE — 4010F PR ACE/ARB THEARPY RXD/TAKEN: ICD-10-PCS | Mod: CPTII,,, | Performed by: PODIATRIST

## 2023-09-26 PROCEDURE — 3079F DIAST BP 80-89 MM HG: CPT | Mod: CPTII,,, | Performed by: PODIATRIST

## 2023-09-26 PROCEDURE — 99214 PR OFFICE/OUTPT VISIT, EST, LEVL IV, 30-39 MIN: ICD-10-PCS | Mod: ,,, | Performed by: PODIATRIST

## 2023-09-26 PROCEDURE — 1159F PR MEDICATION LIST DOCUMENTED IN MEDICAL RECORD: ICD-10-PCS | Mod: CPTII,,, | Performed by: PODIATRIST

## 2023-09-26 PROCEDURE — 3052F HG A1C>EQUAL 8.0%<EQUAL 9.0%: CPT | Mod: CPTII,,, | Performed by: PODIATRIST

## 2023-09-26 PROCEDURE — 99214 OFFICE O/P EST MOD 30 MIN: CPT | Mod: ,,, | Performed by: PODIATRIST

## 2023-09-26 PROCEDURE — 1160F PR REVIEW ALL MEDS BY PRESCRIBER/CLIN PHARMACIST DOCUMENTED: ICD-10-PCS | Mod: CPTII,,, | Performed by: PODIATRIST

## 2023-09-27 NOTE — PROGRESS NOTES
[address] [facility] [address]  [place] [address]  Phone: [phone]   [phone]    Patient's PCP:Stefano Lopez MD  Referring Provider: Aaareferral Self    Subjective:      Chief Complaint:: Wound Care, Diabetic Foot Ulcer, Diabetes, Wound Check, Pressure Ulcer, Non-healing Wound, Numbness, Peripheral Neuropathy, Foot Ulcer, and Difficulty Walking    HPI  Leanna García is a 57 y.o. female who presents today with left plantar midfoot foot diabetic foot ulcer and left medial plantar diabetic foot ulcer.  See wound docs documentation for full assessment and evaluation of all wounds.         Additionally, patient presents to clinic today for evaluation and treatment of diabetic feet.      Additionally, patient presents with dry skin bilateral feet.   Fungal infection of skin explained. Treatment options including no treatment, topical medications, oral medications were discussed, as well as success rates and risks of recurrence. We agreed on topical medication         Athlete's foot counseling: Counseled patient that it is important to keep the feet dry. Use absorbent cotton socks and change them if they become sweaty. Or wear an open-toe shoe or sandal. Wash the feet at least once a day with soap and water. Apply the antifungal cream as prescribed. Recommend spray antiperspirant to the feet. Some antifungal creams are available without a prescription. It may take a week before the rash starts to improve. It can take about 3 to 4 weeks to completely clear. Continue the medicine until the rash is all gone. Use over-the-counter antifungal powders or sprays on your feet after exposure to high-risk environments, such as public showers, gyms, and locker rooms. This can help prevent future infections. Wearing appropriate shoes in these situations can help.         Reviewed all notes from patients medical chart from last several months, including imaging, procedures, studies, progress notes,  cultures,  antibiotic regimens, photos,  etc.      See Wound Docs for assessment of wounds and procedure notes  2. Continue taking all medications as prescribed  3. Dressing changes, see Wound docs for dressing change orders  4. RTC one week   5. Counseled patient on increasing protein intake, not getting wound wet, keeping dressing clean dry and intact, following a healthy diet, elevating legs when able, removing pressure from wound           Proper ulcer care and the possible need for serial debridements, topical medications, specific dressings and biological engineered skin substitutes if indicated.        Counseling/Education:  I provided patient education verbally regarding:   The aspects of diabetes and how it pertains to the feet. I explained the importance of proper diabetic foot care and how it is essential for the health of their feet.     I discussed the importance of knowing their Hemoglobin A1c and that the level needs to be as close to 6 as possible. I discussed the increase complications of high blood sugar including stroke, blindness, heart attack, kidney failure and loss of limb secondary to neuropathy and PVD.      With neuropathy, beware of any breaks in the skin or redness. These areas are not recognized early due to the numbness.     I discussed Diabetes, lower back issues, metabolic disorders, systemic causes, chemotherapy, vitamin deficiency, heavy metal exposure, as some of the causes. I also explained that as much as 40% of the time we can not find a cause. I discussed different treatments available to control the symptoms but which may not cure the problem.         Counseled patient on the aspects of diabetes and how it pertains to the feet.  I explained the importance of proper diabetic foot care and how it is essential for the health of their feet.        Shoe inspection. Patient instructed on proper foot hygeine. We discussed wearing proper shoe gear, daily foot inspections, never walking without  "protective shoe gear, never putting sharp instruments to feet, routine podiatric nail visits every 2-3 months.          Patient should call the office immediately if any signs of infection, such as fever, chills, sweats, increased redness or pain.     Patient was instructed to call the clinic or go to the emergency department if their symptoms do not improve, worsens, or if new symptoms develop.  Patient was advised that if any increased swelling, pain, or numbness arise to go immediately to the ED. Patient knows to call any time if an emergency arises. Shared decision making occurred and patient verbalized understanding in agreement with this plan.         >50% of this > 45 minute visit was spent face to face educating/counseling the patient     I spent a total of 45 minutes on the day of the visit.This includes face to face time and non-face to face time preparing to see the patient (eg, review of tests), obtaining and/or reviewing separately obtained history, documenting clinical information in the electronic or other health record, independently interpreting results and communicating results to the patient/family/caregiver, or care coordinator.        Much of the documentation for this visit was completed in the Wound Docs system.  Please see the attached documentation for further details about the patient's care. Scanned under the Media tab.         Alivia MARTINEZ Bruno     Vitals:    09/26/23 0824   BP: 129/88   Pulse: 76   Resp: 18   Temp: 98.2 °F (36.8 °C)   Weight: 79 kg (174 lb 2.6 oz)   Height: 5' 8" (1.727 m)   PainSc: 0-No pain      Shoe Size:     Past Surgical History:   Procedure Laterality Date    ANGIOGRAPHY OF LOWER EXTREMITY Left 10/18/2022    Procedure: Angiogram Extremity Unilateral;  Surgeon: Ali Khoobehi, MD;  Location: [facility] CATH/EP LAB;  Service: Vascular;  Laterality: Left;    AV FISTULA PLACEMENT Left 8/29/2022    Procedure: CREATION, AV FISTULA;  Surgeon: Ali Khoobehi, MD;  Location: [facility]" OR;  Service: Vascular;  Laterality: Left;    BONE BIOPSY Left 8/24/2022    Procedure: Biopsy-Bone;  Surgeon: Porfirio Ponce DPM;  Location: [facility] OR;  Service: Podiatry;  Laterality: Left;    COLONOSCOPY N/A 10/5/2016    Procedure: COLONOSCOPY;  Surgeon: Pillo Chanel MD;  Location: [facility] ENDO;  Service: Endoscopy;  Laterality: N/A;    COLONOSCOPY N/A 4/26/2022    Procedure: COLONOSCOPY;  Surgeon: Saravanan Rachel MD;  Location: [facility] ENDO;  Service: Endoscopy;  Laterality: N/A;    FISTULOGRAM Left 8/24/2022    Procedure: Fistulogram;  Surgeon: Ali Khoobehi, MD;  Location: [facility] CATH/EP LAB;  Service: Vascular;  Laterality: Left;    HERNIA REPAIR Bilateral 11/22/2016    inguinal    HYSTERECTOMY      INCISION AND DRAINAGE FOOT Left 5/13/2022    Procedure: INCISION AND DRAINAGE, FOOT;  Surgeon: Ricardo Bruno DPM;  Location: [facility] OR;  Service: Podiatry;  Laterality: Left;    OOPHORECTOMY      THROMBECTOMY, AV FISTULA, UPPER EXTREMITY, PERCUTANEOUS  8/24/2022    Procedure: THROMBECTOMY, AV FISTULA, UPPER EXTREMITY, PERCUTANEOUS;  Surgeon: Ali Khoobehi, MD;  Location: [facility] CATH/EP LAB;  Service: Vascular;;    TOE AMPUTATION Left 8/26/2022    Procedure: AMPUTATION, TOE;  Surgeon: Porfirio Ponce DPM;  Location: [facility] OR;  Service: Podiatry;  Laterality: Left;     Past Medical History:   Diagnosis Date    A-fib     resolved per patient    Anemia due to end stage renal disease 6/30/2022    Arthritis     Bronchitis     Cardiovascular event risk, ASCVD 10-year risk 6.7% 10/15/2016    Diabetes mellitus type II     Diabetic foot infection     Diabetic ulcer of left midfoot 05/05/2022    Disorder of kidney and ureter     Encounter for blood transfusion     ESRD (end stage renal disease) 05/18/2018    MANJINDER (generalized anxiety disorder) 10/25/2016    History of colon polyps 11/02/2016    Hyperlipidemia     Hypertension     Stroke      Family History   Problem Relation Age of Onset    Hyperlipidemia Mother      Hypertension Mother     Hypertension Father     Diabetes Father     Diabetes Sister     Diabetes Sister     Diabetes Maternal Aunt     Diabetes Maternal Grandmother     Heart disease Maternal Grandmother     Cancer Paternal Grandmother     Breast cancer Neg Hx     Colon cancer Neg Hx     Ovarian cancer Neg Hx         Social History:   Marital Status:   Alcohol History:  reports no history of alcohol use.  Tobacco History:  reports that she has never smoked. She has never used smokeless tobacco.  Drug History:  reports no history of drug use.    Review of patient's allergies indicates:   Allergen Reactions    Dilaudid [hydromorphone] Nausea And Vomiting    Chlorhexidine Itching    Lortab [hydrocodone-acetaminophen] Itching       Current Outpatient Medications   Medication Sig Dispense Refill    acetaminophen (TYLENOL) 325 MG tablet Take 325 mg by mouth every 6 (six) hours.      ALPRAZolam (XANAX) 0.5 MG tablet Take 1 tablet by mouth.      atorvastatin (LIPITOR) 80 MG tablet Take 1 tablet (80 mg total) by mouth once daily. 90 tablet 3    azelastine (ASTELIN) 137 mcg (0.1 %) nasal spray 1 spray (137 mcg total) by Nasal route 2 (two) times daily. 30 mL 3    blood sugar diagnostic Strp To check BG 3 times daily, to use with insurance preferred meter 100 strip 1    blood sugar diagnostic Strp To check BG 4 times daily, to use with insurance preferred meter 450 strip 3    blood sugar diagnostic Strp To check BG 1 times daily, to use with insurance preferred meter 100 each 11    blood-glucose meter kit To check BG 3 times daily, to use with insurance preferred meter 1 each 0    blood-glucose meter kit To check BG 3 times daily, to use with insurance preferred meter 1 each 0    cetirizine (ZYRTEC) 10 MG tablet Take 1 tablet (10 mg total) by mouth every other day. 45 tablet 3    clopidogreL (PLAVIX) 75 mg tablet Take 1 tablet (75 mg total) by mouth once daily. 90 tablet 3    doxycycline (MONODOX) 100 MG capsule Take 1  "capsule (100 mg total) by mouth 2 (two) times daily. 20 capsule 0    epoetin chris-epbx (RETACRIT) 4,000 unit/mL injection Inject 1.11 mLs (4,440 Units total) into the skin every Mon, Wed, Fri.      EScitalopram oxalate (LEXAPRO) 10 MG tablet Take 1 tablet (10 mg total) by mouth once daily. 30 tablet 11    fluticasone propionate (FLONASE) 50 mcg/actuation nasal spray 2 sprays (100 mcg total) by Each Nostril route once daily. 16 g 3    gabapentin (NEURONTIN) 100 MG capsule Take 1 capsule (100 mg total) by mouth 2 (two) times daily. 180 capsule 3    hydrALAZINE (APRESOLINE) 25 MG tablet Take 1 tablet (25 mg total) by mouth 2 (two) times daily as needed (Systolic blood pressure > 170 mm Hg).      lancets Misc To check BG 3 times daily, to use with insurance preferred meter 100 each 1    lancets Misc To check BG 1 times daily, to use with insurance preferred meter 100 each 11    minoxidiL (LONITEN) 10 MG Tab Take 10 mg by mouth 2 (two) times daily.      multivitamin (THERAGRAN) per tablet Take 1 tablet by mouth.      pen needle, diabetic (BD ULTRA-FINE ROBERT PEN NEEDLE) 32 gauge x 5/32" Ndle 1 pen by Misc.(Non-Drug; Combo Route) route 4 (four) times daily. To use 4 times per day with insulin injections. 100 each 1    sucroferric oxyhydroxide (VELPHORO) 500 mg Chew Take 3 tablets by mouth.       No current facility-administered medications for this visit.       Review of Systems   Constitutional:  Negative for chills, fatigue, fever and unexpected weight change.   HENT:  Negative for hearing loss and trouble swallowing.    Eyes:  Negative for photophobia and visual disturbance.   Respiratory:  Negative for cough, shortness of breath and wheezing.    Cardiovascular:  Negative for chest pain, palpitations and leg swelling.   Gastrointestinal:  Negative for abdominal pain and nausea.   Genitourinary:  Negative for dysuria and frequency.   Musculoskeletal:  Positive for gait problem. Negative for arthralgias, back pain, joint " swelling and myalgias.   Skin:  Positive for wound. Negative for rash.   Neurological:  Positive for numbness. Negative for tremors, seizures, weakness and headaches.   Hematological:  Does not bruise/bleed easily.         Objective:        Physical Exam:   Foot Exam  Physical Exam  Ortho/SPM Exam     Imaging:            Assessment:       1. Ulcer of left foot with fat layer exposed    2. ESRD (end stage renal disease)    3. At high risk for inadequate nutritional intake    4. Diabetic foot infection    5. History of TIA (transient ischemic attack)    6. History of amputation of left foot    7. Encounter for diabetic foot exam    8. At risk for readmission to hospital    9. Bilateral lower extremity edema    10. Type 2 diabetes mellitus with chronic kidney disease on chronic dialysis, with long-term current use of insulin    11. History of foot ulcer    12. 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 Type 2 diabetes mellitus with hypoglycemia and coma, with long-term current use of insulin    14. Peripheral vascular disease    15. Difficulty in walking, not elsewhere classified    16. Decreased pedal pulses    17. Hypertension associated with diabetes    18. Onychogryphosis    19. 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    Procedures          Counseling:     I provided patient education verbally regarding:   Patient diagnosis, treatment options, as well as alternatives, risks, and benefits.     This note was created using Dragon voice recognition software that occasionally misinterpreted phrases or words.

## 2023-10-03 ENCOUNTER — OFFICE VISIT (OUTPATIENT)
Dept: WOUND CARE | Facility: HOSPITAL | Age: 58
End: 2023-10-03
Attending: PODIATRIST
Payer: MEDICARE

## 2023-10-03 ENCOUNTER — OFFICE VISIT (OUTPATIENT)
Dept: CARDIOLOGY | Facility: CLINIC | Age: 58
End: 2023-10-03
Payer: MEDICARE

## 2023-10-03 VITALS
BODY MASS INDEX: 27.62 KG/M2 | OXYGEN SATURATION: 97 % | SYSTOLIC BLOOD PRESSURE: 122 MMHG | HEART RATE: 75 BPM | RESPIRATION RATE: 16 BRPM | WEIGHT: 176 LBS | HEIGHT: 67 IN | DIASTOLIC BLOOD PRESSURE: 72 MMHG

## 2023-10-03 VITALS
HEART RATE: 78 BPM | RESPIRATION RATE: 18 BRPM | TEMPERATURE: 98 F | BODY MASS INDEX: 27.34 KG/M2 | SYSTOLIC BLOOD PRESSURE: 136 MMHG | DIASTOLIC BLOOD PRESSURE: 91 MMHG | WEIGHT: 174.19 LBS | HEIGHT: 67 IN

## 2023-10-03 DIAGNOSIS — I73.9 PERIPHERAL VASCULAR DISEASE: ICD-10-CM

## 2023-10-03 DIAGNOSIS — Z89.432 HISTORY OF AMPUTATION OF LEFT FOOT: ICD-10-CM

## 2023-10-03 DIAGNOSIS — E11.641 TYPE 2 DIABETES MELLITUS WITH HYPOGLYCEMIA AND COMA, WITH LONG-TERM CURRENT USE OF INSULIN: ICD-10-CM

## 2023-10-03 DIAGNOSIS — Z86.73 HISTORY OF TIA (TRANSIENT ISCHEMIC ATTACK): ICD-10-CM

## 2023-10-03 DIAGNOSIS — E11.59 HYPERTENSION ASSOCIATED WITH DIABETES: Chronic | ICD-10-CM

## 2023-10-03 DIAGNOSIS — Z91.89 AT RISK FOR READMISSION TO HOSPITAL: ICD-10-CM

## 2023-10-03 DIAGNOSIS — Z86.79 HISTORY OF ATRIAL FIBRILLATION: ICD-10-CM

## 2023-10-03 DIAGNOSIS — I15.2 HYPERTENSION ASSOCIATED WITH DIABETES: Chronic | ICD-10-CM

## 2023-10-03 DIAGNOSIS — E11.59 HYPERTENSION ASSOCIATED WITH DIABETES: ICD-10-CM

## 2023-10-03 DIAGNOSIS — R26.2 DIFFICULTY IN WALKING, NOT ELSEWHERE CLASSIFIED: ICD-10-CM

## 2023-10-03 DIAGNOSIS — I73.9 PAD (PERIPHERAL ARTERY DISEASE): ICD-10-CM

## 2023-10-03 DIAGNOSIS — Z99.2 TYPE 2 DIABETES MELLITUS WITH CHRONIC KIDNEY DISEASE ON CHRONIC DIALYSIS, WITH LONG-TERM CURRENT USE OF INSULIN: ICD-10-CM

## 2023-10-03 DIAGNOSIS — N18.6 TYPE 2 DIABETES MELLITUS WITH CHRONIC KIDNEY DISEASE ON CHRONIC DIALYSIS, WITH LONG-TERM CURRENT USE OF INSULIN: ICD-10-CM

## 2023-10-03 DIAGNOSIS — E11.628 DIABETIC FOOT INFECTION: ICD-10-CM

## 2023-10-03 DIAGNOSIS — Z91.89 AT HIGH RISK FOR INADEQUATE NUTRITIONAL INTAKE: ICD-10-CM

## 2023-10-03 DIAGNOSIS — E11.22 TYPE 2 DIABETES MELLITUS WITH CHRONIC KIDNEY DISEASE ON CHRONIC DIALYSIS, WITH LONG-TERM CURRENT USE OF INSULIN: ICD-10-CM

## 2023-10-03 DIAGNOSIS — I50.32 CHRONIC DIASTOLIC HEART FAILURE: ICD-10-CM

## 2023-10-03 DIAGNOSIS — R60.0 BILATERAL LOWER EXTREMITY EDEMA: ICD-10-CM

## 2023-10-03 DIAGNOSIS — L60.2 ONYCHOGRYPHOSIS: ICD-10-CM

## 2023-10-03 DIAGNOSIS — E11.9 ENCOUNTER FOR DIABETIC FOOT EXAM: ICD-10-CM

## 2023-10-03 DIAGNOSIS — Z79.4 TYPE 2 DIABETES MELLITUS WITH CHRONIC KIDNEY DISEASE ON CHRONIC DIALYSIS, WITH LONG-TERM CURRENT USE OF INSULIN: ICD-10-CM

## 2023-10-03 DIAGNOSIS — Z79.4 TYPE 2 DIABETES MELLITUS WITH HYPOGLYCEMIA AND COMA, WITH LONG-TERM CURRENT USE OF INSULIN: ICD-10-CM

## 2023-10-03 DIAGNOSIS — L97.522 ULCER OF LEFT FOOT WITH FAT LAYER EXPOSED: Primary | ICD-10-CM

## 2023-10-03 DIAGNOSIS — L08.9 DIABETIC FOOT INFECTION: ICD-10-CM

## 2023-10-03 DIAGNOSIS — I15.2 HYPERTENSION ASSOCIATED WITH DIABETES: ICD-10-CM

## 2023-10-03 DIAGNOSIS — R09.89 DECREASED PEDAL PULSES: ICD-10-CM

## 2023-10-03 DIAGNOSIS — N18.6 ESRD (END STAGE RENAL DISEASE): ICD-10-CM

## 2023-10-03 DIAGNOSIS — E11.649 TYPE 2 DIABETES MELLITUS WITH HYPOGLYCEMIA UNAWARENESS: ICD-10-CM

## 2023-10-03 DIAGNOSIS — B35.3 TINEA PEDIS OF BOTH FEET: ICD-10-CM

## 2023-10-03 DIAGNOSIS — E78.00 PURE HYPERCHOLESTEROLEMIA: ICD-10-CM

## 2023-10-03 DIAGNOSIS — Z87.2 HISTORY OF FOOT ULCER: ICD-10-CM

## 2023-10-03 PROCEDURE — 93000 EKG 12-LEAD: ICD-10-PCS | Mod: S$GLB,,, | Performed by: INTERNAL MEDICINE

## 2023-10-03 PROCEDURE — 99214 OFFICE O/P EST MOD 30 MIN: CPT | Mod: ,,, | Performed by: PODIATRIST

## 2023-10-03 PROCEDURE — 3075F SYST BP GE 130 - 139MM HG: CPT | Mod: CPTII,,, | Performed by: PODIATRIST

## 2023-10-03 PROCEDURE — 1159F PR MEDICATION LIST DOCUMENTED IN MEDICAL RECORD: ICD-10-PCS | Mod: CPTII,S$GLB,, | Performed by: INTERNAL MEDICINE

## 2023-10-03 PROCEDURE — 93000 ELECTROCARDIOGRAM COMPLETE: CPT | Mod: S$GLB,,, | Performed by: INTERNAL MEDICINE

## 2023-10-03 PROCEDURE — 4010F ACE/ARB THERAPY RXD/TAKEN: CPT | Mod: CPTII,S$GLB,, | Performed by: INTERNAL MEDICINE

## 2023-10-03 PROCEDURE — 99214 OFFICE O/P EST MOD 30 MIN: CPT | Mod: 25,S$GLB,, | Performed by: INTERNAL MEDICINE

## 2023-10-03 PROCEDURE — 3052F HG A1C>EQUAL 8.0%<EQUAL 9.0%: CPT | Mod: CPTII,,, | Performed by: PODIATRIST

## 2023-10-03 PROCEDURE — 3080F PR MOST RECENT DIASTOLIC BLOOD PRESSURE >= 90 MM HG: ICD-10-PCS | Mod: CPTII,,, | Performed by: PODIATRIST

## 2023-10-03 PROCEDURE — 99214 PR OFFICE/OUTPT VISIT, EST, LEVL IV, 30-39 MIN: ICD-10-PCS | Mod: 25,S$GLB,, | Performed by: INTERNAL MEDICINE

## 2023-10-03 PROCEDURE — 99214 PR OFFICE/OUTPT VISIT, EST, LEVL IV, 30-39 MIN: ICD-10-PCS | Mod: ,,, | Performed by: PODIATRIST

## 2023-10-03 PROCEDURE — 1159F MED LIST DOCD IN RCRD: CPT | Mod: CPTII,,, | Performed by: PODIATRIST

## 2023-10-03 PROCEDURE — 1159F MED LIST DOCD IN RCRD: CPT | Mod: CPTII,S$GLB,, | Performed by: INTERNAL MEDICINE

## 2023-10-03 PROCEDURE — 3052F PR MOST RECENT HEMOGLOBIN A1C LEVEL 8.0 - < 9.0%: ICD-10-PCS | Mod: CPTII,,, | Performed by: PODIATRIST

## 2023-10-03 PROCEDURE — 99213 OFFICE O/P EST LOW 20 MIN: CPT | Mod: PN | Performed by: PODIATRIST

## 2023-10-03 PROCEDURE — 1159F PR MEDICATION LIST DOCUMENTED IN MEDICAL RECORD: ICD-10-PCS | Mod: CPTII,,, | Performed by: PODIATRIST

## 2023-10-03 PROCEDURE — 3074F SYST BP LT 130 MM HG: CPT | Mod: CPTII,S$GLB,, | Performed by: INTERNAL MEDICINE

## 2023-10-03 PROCEDURE — 3052F PR MOST RECENT HEMOGLOBIN A1C LEVEL 8.0 - < 9.0%: ICD-10-PCS | Mod: CPTII,S$GLB,, | Performed by: INTERNAL MEDICINE

## 2023-10-03 PROCEDURE — 3008F PR BODY MASS INDEX (BMI) DOCUMENTED: ICD-10-PCS | Mod: CPTII,,, | Performed by: PODIATRIST

## 2023-10-03 PROCEDURE — 3008F BODY MASS INDEX DOCD: CPT | Mod: CPTII,,, | Performed by: PODIATRIST

## 2023-10-03 PROCEDURE — 4010F PR ACE/ARB THEARPY RXD/TAKEN: ICD-10-PCS | Mod: CPTII,,, | Performed by: PODIATRIST

## 2023-10-03 PROCEDURE — 3080F DIAST BP >= 90 MM HG: CPT | Mod: CPTII,,, | Performed by: PODIATRIST

## 2023-10-03 PROCEDURE — 3074F PR MOST RECENT SYSTOLIC BLOOD PRESSURE < 130 MM HG: ICD-10-PCS | Mod: CPTII,S$GLB,, | Performed by: INTERNAL MEDICINE

## 2023-10-03 PROCEDURE — 3075F PR MOST RECENT SYSTOLIC BLOOD PRESS GE 130-139MM HG: ICD-10-PCS | Mod: CPTII,,, | Performed by: PODIATRIST

## 2023-10-03 PROCEDURE — 3008F PR BODY MASS INDEX (BMI) DOCUMENTED: ICD-10-PCS | Mod: CPTII,S$GLB,, | Performed by: INTERNAL MEDICINE

## 2023-10-03 PROCEDURE — 3078F DIAST BP <80 MM HG: CPT | Mod: CPTII,S$GLB,, | Performed by: INTERNAL MEDICINE

## 2023-10-03 PROCEDURE — 1160F PR REVIEW ALL MEDS BY PRESCRIBER/CLIN PHARMACIST DOCUMENTED: ICD-10-PCS | Mod: CPTII,S$GLB,, | Performed by: INTERNAL MEDICINE

## 2023-10-03 PROCEDURE — 4010F ACE/ARB THERAPY RXD/TAKEN: CPT | Mod: CPTII,,, | Performed by: PODIATRIST

## 2023-10-03 PROCEDURE — 3078F PR MOST RECENT DIASTOLIC BLOOD PRESSURE < 80 MM HG: ICD-10-PCS | Mod: CPTII,S$GLB,, | Performed by: INTERNAL MEDICINE

## 2023-10-03 PROCEDURE — 3008F BODY MASS INDEX DOCD: CPT | Mod: CPTII,S$GLB,, | Performed by: INTERNAL MEDICINE

## 2023-10-03 PROCEDURE — 3052F HG A1C>EQUAL 8.0%<EQUAL 9.0%: CPT | Mod: CPTII,S$GLB,, | Performed by: INTERNAL MEDICINE

## 2023-10-03 PROCEDURE — 1160F RVW MEDS BY RX/DR IN RCRD: CPT | Mod: CPTII,S$GLB,, | Performed by: INTERNAL MEDICINE

## 2023-10-03 PROCEDURE — 1160F PR REVIEW ALL MEDS BY PRESCRIBER/CLIN PHARMACIST DOCUMENTED: ICD-10-PCS | Mod: CPTII,,, | Performed by: PODIATRIST

## 2023-10-03 PROCEDURE — 99999 PR PBB SHADOW E&M-EST. PATIENT-LVL III: CPT | Mod: PBBFAC,,, | Performed by: INTERNAL MEDICINE

## 2023-10-03 PROCEDURE — 1160F RVW MEDS BY RX/DR IN RCRD: CPT | Mod: CPTII,,, | Performed by: PODIATRIST

## 2023-10-03 PROCEDURE — 4010F PR ACE/ARB THEARPY RXD/TAKEN: ICD-10-PCS | Mod: CPTII,S$GLB,, | Performed by: INTERNAL MEDICINE

## 2023-10-03 PROCEDURE — 99999 PR PBB SHADOW E&M-EST. PATIENT-LVL III: ICD-10-PCS | Mod: PBBFAC,,, | Performed by: INTERNAL MEDICINE

## 2023-10-03 RX ORDER — SEMAGLUTIDE 0.68 MG/ML
INJECTION, SOLUTION SUBCUTANEOUS
COMMUNITY
Start: 2023-09-25 | End: 2023-12-12 | Stop reason: SDUPTHER

## 2023-10-03 NOTE — ASSESSMENT & PLAN NOTE
History of paroxysmal atrial fibrillation she appears relatively stable presently she is in sinus rhythm rate of 71 beats per minute.  EKG shows sinus rhythm left axis deviation septal infarct pattern with a rate of 71 beats per minute.Continue on Plavix 75 mg daily

## 2023-10-03 NOTE — ASSESSMENT & PLAN NOTE
Her blood pressure is stable at 120 2/72 mm Hg continue on minoxidil 2.5 mg tablets twice a day maintain the same regimen and she is taking hydralazine p.r.n. basis 25 b.i.d..

## 2023-10-03 NOTE — PROGRESS NOTES
Subjective:    Patient ID:  Leanna García is a 58 y.o. female patient here for evaluation Follow-up (Est care with Dr. Adame. Was seen at [facility] when she was trying to go thru kidney transplant )      History of Present Illness:   Patient is a 58-year-old lady who has complex medical problems including type 2 diabetes chronic kidney disease presently on hemodialysis Monday Wednesday Friday and has been going through reasonably well.  She has no occurrence of any arm neck or jaw pain she ambulates with a Rollator.  But overall she is doing fairly well denies having chest discomfort no arm neck or jaw pain and no significant dyspeptic symptoms are noted.    There is no swelling in the lower extremities          Review of patient's allergies indicates:   Allergen Reactions    Dilaudid [hydromorphone] Nausea And Vomiting    Chlorhexidine Itching    Lortab [hydrocodone-acetaminophen] Itching       Past Medical History:   Diagnosis Date    A-fib     resolved per patient    Anemia due to end stage renal disease 6/30/2022    Arthritis     Bronchitis     Cardiovascular event risk, ASCVD 10-year risk 6.7% 10/15/2016    Diabetes mellitus type II     Diabetic foot infection     Diabetic ulcer of left midfoot 05/05/2022    Disorder of kidney and ureter     Encounter for blood transfusion     ESRD (end stage renal disease) 05/18/2018    MANJINDER (generalized anxiety disorder) 10/25/2016    History of colon polyps 11/02/2016    Hyperlipidemia     Hypertension     Stroke      Past Surgical History:   Procedure Laterality Date    ANGIOGRAPHY OF LOWER EXTREMITY Left 10/18/2022    Procedure: Angiogram Extremity Unilateral;  Surgeon: Ali Khoobehi, MD;  Location: [facility] CATH/EP LAB;  Service: Vascular;  Laterality: Left;    AV FISTULA PLACEMENT Left 8/29/2022    Procedure: CREATION, AV FISTULA;  Surgeon: Ali Khoobehi, MD;  Location: [facility] OR;  Service: Vascular;  Laterality: Left;    BONE BIOPSY Left 8/24/2022    Procedure:  Biopsy-Bone;  Surgeon: Porfirio Ponce DPM;  Location: [facility] OR;  Service: Podiatry;  Laterality: Left;    COLONOSCOPY N/A 10/5/2016    Procedure: COLONOSCOPY;  Surgeon: Pillo Chanel MD;  Location: [facility] ENDO;  Service: Endoscopy;  Laterality: N/A;    COLONOSCOPY N/A 4/26/2022    Procedure: COLONOSCOPY;  Surgeon: Saravanan Rachel MD;  Location: [facility] ENDO;  Service: Endoscopy;  Laterality: N/A;    FISTULOGRAM Left 8/24/2022    Procedure: Fistulogram;  Surgeon: Ali Khoobehi, MD;  Location: [facility] CATH/EP LAB;  Service: Vascular;  Laterality: Left;    HERNIA REPAIR Bilateral 11/22/2016    inguinal    HYSTERECTOMY      INCISION AND DRAINAGE FOOT Left 5/13/2022    Procedure: INCISION AND DRAINAGE, FOOT;  Surgeon: Ricardo Bruno DPM;  Location: [facility] OR;  Service: Podiatry;  Laterality: Left;    OOPHORECTOMY      THROMBECTOMY, AV FISTULA, UPPER EXTREMITY, PERCUTANEOUS  8/24/2022    Procedure: THROMBECTOMY, AV FISTULA, UPPER EXTREMITY, PERCUTANEOUS;  Surgeon: Ali Khoobehi, MD;  Location: [facility] CATH/EP LAB;  Service: Vascular;;    TOE AMPUTATION Left 8/26/2022    Procedure: AMPUTATION, TOE;  Surgeon: Porfirio Ponce DPM;  Location: [facility] OR;  Service: Podiatry;  Laterality: Left;     Social History     Tobacco Use    Smoking status: Never    Smokeless tobacco: Never   Substance Use Topics    Alcohol use: No    Drug use: No        Review of Systems:    As noted in HPI in addition      REVIEW OF SYSTEMS  CARDIOVASCULAR: No recent chest pain, palpitations, arm, neck, or jaw pain  RESPIRATORY: No recent fever, cough chills, SOB or congestion  : No blood in the urine  GI: No Nausea, vomiting, constipation, diarrhea, blood, or reflux.  MUSCULOSKELETAL: No myalgias  NEURO: No lightheadedness or dizziness  EYES: No Double vision, blurry, vision or headache              Objective        Vitals:    10/03/23 1111   BP: 122/72   Pulse: 75   Resp: 16       LIPIDS - LAST 2   Lab Results   Component Value Date    CHOL 199  07/27/2023    CHOL 251 (H) 03/10/2022    HDL 45 07/27/2023    HDL 50 03/10/2022    LDLCALC 127.6 07/27/2023    LDLCALC 158.0 03/10/2022    TRIG 132 07/27/2023    TRIG 215 (H) 03/10/2022    CHOLHDL 22.6 07/27/2023    CHOLHDL 19.9 (L) 03/10/2022       CBC - LAST 2  Lab Results   Component Value Date    WBC 9.39 11/04/2022    WBC 10.68 11/03/2022    RBC 3.70 (L) 11/04/2022    RBC 3.89 (L) 11/03/2022    HGB 10.6 (L) 11/04/2022    HGB 11.1 (L) 11/03/2022    HCT 34.2 (L) 11/04/2022    HCT 35.6 (L) 11/03/2022    MCV 92 11/04/2022    MCV 92 11/03/2022    MCH 28.6 11/04/2022    MCH 28.5 11/03/2022    MCHC 31.0 (L) 11/04/2022    MCHC 31.2 (L) 11/03/2022    RDW 14.2 11/04/2022    RDW 14.2 11/03/2022     11/04/2022     11/03/2022    MPV 10.9 11/04/2022    MPV 10.9 11/03/2022    GRAN 7.2 11/03/2022    GRAN 67.3 11/03/2022    LYMPH 2.3 11/03/2022    LYMPH 21.4 11/03/2022    MONO 0.8 11/03/2022    MONO 7.5 11/03/2022    BASO 0.06 11/03/2022    BASO 0.06 11/02/2022    NRBC 0 11/03/2022    NRBC 0 11/02/2022       CHEMISTRY & LIVER FUNCTION - LAST 2  Lab Results   Component Value Date     06/06/2023     (L) 11/04/2022    K 5.2 (H) 06/06/2023    K 4.8 11/04/2022    CL 93 (L) 06/06/2023     11/04/2022    CO2 30 (H) 06/06/2023    CO2 21 (L) 11/04/2022    ANIONGAP 15 06/06/2023    ANIONGAP 14 11/04/2022    BUN 58 (H) 06/06/2023    BUN 60 (H) 11/04/2022    CREATININE 6.7 (H) 06/06/2023    CREATININE 6.5 (H) 11/04/2022     (H) 06/06/2023     (H) 11/04/2022    CALCIUM 9.8 06/06/2023    CALCIUM 9.1 11/04/2022    PH 7.273 (L) 05/19/2021    MG 2.3 11/04/2022    MG 2.3 11/03/2022    ALBUMIN 3.9 06/06/2023    ALBUMIN 3.7 11/03/2022    PROT 7.7 06/06/2023    PROT 7.3 11/03/2022    ALKPHOS 106 06/06/2023    ALKPHOS 79 11/03/2022    ALT 11 06/06/2023    ALT 27 11/03/2022    AST 12 06/06/2023    AST 16 11/03/2022    BILITOT 0.4 06/06/2023    BILITOT 0.7 11/03/2022        CARDIAC PROFILE - LAST 2  Lab  Results   Component Value Date     (H) 11/02/2022     (H) 08/18/2022    CPK 62 08/18/2022    CPK 38 08/08/2022    CPKMB 3.9 08/18/2022    TROPONINI <0.030 11/03/2022    TROPONINI <0.030 11/03/2022        COAGULATION - LAST 2  Lab Results   Component Value Date    LABPT 15.3 (H) 05/04/2022    LABPT 16.0 (H) 05/19/2021    INR 1.3 05/04/2022    INR 1.0 03/10/2022    APTT 22.5 (L) 05/04/2022    APTT 23.1 03/10/2022       ENDOCRINE & PSA - LAST 2  Lab Results   Component Value Date    HGBA1C 8.3 (H) 06/06/2023    HGBA1C 7.2 (H) 10/17/2022    MICROALBUR 13.3 08/12/2014    MICROALBUR 0.5 05/23/2013    TSH 5.000 11/03/2022    TSH 1.080 05/04/2022    PROCAL 0.40 (H) 06/07/2022    PROCAL 34.48 (H) 05/05/2022        ECHOCARDIOGRAM RESULTS  Results for orders placed during the hospital encounter of 05/04/22    Echo    Interpretation Summary  · The left ventricle is normal in size with normal systolic function.  · The estimated ejection fraction is 65%.  · Normal left ventricular diastolic function.  · Normal right ventricular size with normal right ventricular systolic function.  · Mild mitral regurgitation.      CURRENT/PREVIOUS VISIT EKG  Results for orders placed or performed during the hospital encounter of 11/02/22   EKG 12-lead    Collection Time: 11/02/22  3:50 PM    Narrative    Test Reason : R55,    Vent. Rate : 067 BPM     Atrial Rate : 067 BPM     P-R Int : 122 ms          QRS Dur : 084 ms      QT Int : 440 ms       P-R-T Axes : 033 -33 083 degrees     QTc Int : 464 ms    Normal sinus rhythm  Left axis deviation  Nonspecific ST abnormality  Abnormal ECG  When compared with ECG of 17-OCT-2022 20:06,  No significant change was found  Confirmed by Salomon Adame MD ([id number]) on 11/2/2022 9:14:50 PM    Referred By:             Confirmed By:Salomon Adame MD     No valid procedures specified.   Results for orders placed during the hospital encounter of 04/05/22    Lexiscan Card Interp Cupid Stress test with  myocardial perfusion    Interpretation Summary    Equivocal myocardial perfusion scan. There is significant patient motion.    There is a mild intensity, small sized, reversible perfusion abnormality that is consistent with ischemia in the basal to mid inferolateral wall(s) in the typical distribution of the LCX territory. There is soft tissue attenuation overlying this region as well as significant patient motion.    The LVEF is not accurate. The visually estimated ejection fraction is normal at rest and normal during stress.    There is normal wall motion at rest and post stress.    LV cavity size is normal at rest and normal at stress.    The EKG portion of this study is uninterpretable due to LVH with repolarization abnormalities.    The patient reported chest pain during the stress test.    There were no arrhythmias during stress.    When compared to the previous study from 10/14/2016, there is a mild reversible defect in the inferolateral walls.      10/03/2023 EKG shows sinus rhythm left axis deviation septal infarct pattern.    PHYSICAL EXAM  CONSTITUTIONAL: Well built, well nourished in no apparent distress  NECK:  No JVD bilateral carotid bruits are noted.    LUNGS: CTA  CHEST WALL: no tenderness  HEART: regular rate and rhythm, normal S1 diminished S2 grade 3 systolic ejection murmur noted in the aortic area and left upper sternal border.    ABDOMEN: soft, non-tender; bowel sounds normal; no masses,  no organomegaly  EXTREMITIES: Extremities normal, no edema, no calf tenderness noted there is some loss of muscle mass in the thighs.  NEURO: AAO X 3    I HAVE REVIEWED :    The vital signs, nurses notes, and all the pertinent radiology and labs.    10/03/2023 EKG shows normal sinus rhythm left axis deviation septal infarct pattern versus poor R-wave progression.    Current Outpatient Medications   Medication Instructions    acetaminophen (TYLENOL) 325 mg, Oral, Every 6 hours    ALPRAZolam (XANAX) 0.5 MG  "tablet 1 tablet, Oral    atorvastatin (LIPITOR) 80 mg, Oral, Daily    azelastine (ASTELIN) 137 mcg, Nasal, 2 times daily    blood sugar diagnostic Strp To check BG 3 times daily, to use with insurance preferred meter    blood sugar diagnostic Strp To check BG 4 times daily, to use with insurance preferred meter    blood sugar diagnostic Strp To check BG 1 times daily, to use with insurance preferred meter    blood-glucose meter kit To check BG 3 times daily, to use with insurance preferred meter    blood-glucose meter kit To check BG 3 times daily, to use with insurance preferred meter    cetirizine (ZYRTEC) 10 mg, Oral, Every other day    clopidogreL (PLAVIX) 75 mg, Oral, Daily    doxycycline (MONODOX) 100 mg, Oral, 2 times daily    epoetin chris-epbx (RETACRIT) 50 Units/kg, Subcutaneous, Every Mon, Wed, Fri    EScitalopram oxalate (LEXAPRO) 10 mg, Oral, Daily    fluticasone propionate (FLONASE) 100 mcg, Each Nostril, Daily    gabapentin (NEURONTIN) 100 mg, Oral, 2 times daily    hydrALAZINE (APRESOLINE) 25 mg, Oral, 2 times daily PRN    lancets Misc To check BG 3 times daily, to use with insurance preferred meter    lancets Misc To check BG 1 times daily, to use with insurance preferred meter    minoxidiL (LONITEN) 10 mg, Oral, 2 times daily    multivitamin (THERAGRAN) per tablet 1 tablet, Oral    OZEMPIC 0.25 mg or 0.5 mg (2 mg/3 mL) pen injector INJECT 0.25 MG SUBCUTANEOUSLY ONCE A WEEK    pen needle, diabetic (BD ULTRA-FINE ROBERT PEN NEEDLE) 32 gauge x 5/32" Ndle 1 pen , Misc.(Non-Drug; Combo Route), 4 times daily, To use 4 times per day with insulin injections.    sucroferric oxyhydroxide (VELPHORO) 500 mg Chew 3 tablets, Oral          Assessment & Plan     Chronic diastolic heart failure  She is fairly well controlled on the present time I have encouraged her to continue on same medicines including hydralazine 25 mg p.o. p.r.n. for systolic blood pressure is elevated.  Currently blood pressure 122/72 mm " Hg    History of atrial fibrillation, 2015  History of paroxysmal atrial fibrillation she appears relatively stable presently she is in sinus rhythm rate of 71 beats per minute.  EKG shows sinus rhythm left axis deviation septal infarct pattern with a rate of 71 beats per minute.Continue on Plavix 75 mg daily    Hypertension associated with diabetes  Her blood pressure is stable at 120 2/72 mm Hg continue on minoxidil 2.5 mg tablets twice a day maintain the same regimen and she is taking hydralazine p.r.n. basis 25 b.i.d..    Hyperlipemia  Continue on Lipitor 80 mg a day maintain low-salt low-fat diet    PAD (peripheral artery disease)  She would great toe amputation on the left and ulcers seem to have healed.  Continue on present regimen including Plavix 75 mg daily          Follow up in about 6 months (around 4/3/2024).

## 2023-10-03 NOTE — ASSESSMENT & PLAN NOTE
She would great toe amputation on the left and ulcers seem to have healed.  Continue on present regimen including Plavix 75 mg daily

## 2023-10-03 NOTE — ASSESSMENT & PLAN NOTE
She is fairly well controlled on the present time I have encouraged her to continue on same medicines including hydralazine 25 mg p.o. p.r.n. for systolic blood pressure is elevated.  Currently blood pressure 122/72 mm Hg

## 2023-10-04 NOTE — PROGRESS NOTES
[address] [facility] [address]  [place] [address]  Phone: [phone]   [phone]    Patient's PCP:Stefano Lopez MD  Referring Provider: Aaareferral Self    Subjective:      Chief Complaint:: Wound Care, Diabetes, Wound Check, Non-healing Wound, Pressure Ulcer, Numbness, Peripheral Neuropathy, Foot Ulcer, Diabetic Foot Ulcer, and Difficulty Walking    HPI  Leanna García is a 57 y.o. female who presents today with left plantar midfoot foot diabetic foot ulcer and left medial plantar diabetic foot ulcer.  See wound docs documentation for full assessment and evaluation of all wounds.         Additionally, patient presents to clinic today for evaluation and treatment of diabetic feet.      Additionally, patient presents with dry skin bilateral feet.   Fungal infection of skin explained. Treatment options including no treatment, topical medications, oral medications were discussed, as well as success rates and risks of recurrence. We agreed on topical medication         Athlete's foot counseling: Counseled patient that it is important to keep the feet dry. Use absorbent cotton socks and change them if they become sweaty. Or wear an open-toe shoe or sandal. Wash the feet at least once a day with soap and water. Apply the antifungal cream as prescribed. Recommend spray antiperspirant to the feet. Some antifungal creams are available without a prescription. It may take a week before the rash starts to improve. It can take about 3 to 4 weeks to completely clear. Continue the medicine until the rash is all gone. Use over-the-counter antifungal powders or sprays on your feet after exposure to high-risk environments, such as public showers, gyms, and locker rooms. This can help prevent future infections. Wearing appropriate shoes in these situations can help.         Reviewed all notes from patients medical chart from last several months, including imaging, procedures, studies, progress notes,  cultures,  antibiotic regimens, photos,  etc.      Healed See Wound Docs for assessment of wounds and procedure notes  2. Continue taking all medications as prescribed  3. Dressing changes, see Wound docs for dressing change orders  4. RTC one week   5. Counseled patient on increasing protein intake, not getting wound wet, keeping dressing clean dry and intact, following a healthy diet, elevating legs when able, removing pressure from wound           Proper ulcer care and the possible need for serial debridements, topical medications, specific dressings and biological engineered skin substitutes if indicated.        Counseling/Education:  I provided patient education verbally regarding:   The aspects of diabetes and how it pertains to the feet. I explained the importance of proper diabetic foot care and how it is essential for the health of their feet.     I discussed the importance of knowing their Hemoglobin A1c and that the level needs to be as close to 6 as possible. I discussed the increase complications of high blood sugar including stroke, blindness, heart attack, kidney failure and loss of limb secondary to neuropathy and PVD.      With neuropathy, beware of any breaks in the skin or redness. These areas are not recognized early due to the numbness.     I discussed Diabetes, lower back issues, metabolic disorders, systemic causes, chemotherapy, vitamin deficiency, heavy metal exposure, as some of the causes. I also explained that as much as 40% of the time we can not find a cause. I discussed different treatments available to control the symptoms but which may not cure the problem.         Counseled patient on the aspects of diabetes and how it pertains to the feet.  I explained the importance of proper diabetic foot care and how it is essential for the health of their feet.        Shoe inspection. Patient instructed on proper foot hygeine. We discussed wearing proper shoe gear, daily foot inspections, never walking  "without protective shoe gear, never putting sharp instruments to feet, routine podiatric nail visits every 2-3 months.          Patient should call the office immediately if any signs of infection, such as fever, chills, sweats, increased redness or pain.     Patient was instructed to call the clinic or go to the emergency department if their symptoms do not improve, worsens, or if new symptoms develop.  Patient was advised that if any increased swelling, pain, or numbness arise to go immediately to the ED. Patient knows to call any time if an emergency arises. Shared decision making occurred and patient verbalized understanding in agreement with this plan.         >50% of this > 45 minute visit was spent face to face educating/counseling the patient     I spent a total of 45 minutes on the day of the visit.This includes face to face time and non-face to face time preparing to see the patient (eg, review of tests), obtaining and/or reviewing separately obtained history, documenting clinical information in the electronic or other health record, independently interpreting results and communicating results to the patient/family/caregiver, or care coordinator.        Much of the documentation for this visit was completed in the Wound Docs system.  Please see the attached documentation for further details about the patient's care. Scanned under the Media tab.         Alivia Bruno DPM     Vitals:    10/03/23 0834   BP: (!) 136/91   Pulse: 78   Resp: 18   Temp: 98.4 °F (36.9 °C)   Weight: 79 kg (174 lb 2.6 oz)   Height: 5' 7" (1.702 m)   PainSc: 0-No pain      Shoe Size:     Past Surgical History:   Procedure Laterality Date    ANGIOGRAPHY OF LOWER EXTREMITY Left 10/18/2022    Procedure: Angiogram Extremity Unilateral;  Surgeon: Ali Khoobehi, MD;  Location: [facility] CATH/EP LAB;  Service: Vascular;  Laterality: Left;    AV FISTULA PLACEMENT Left 8/29/2022    Procedure: CREATION, AV FISTULA;  Surgeon: Ali Khoobehi, MD;  " Location: [facility] OR;  Service: Vascular;  Laterality: Left;    BONE BIOPSY Left 8/24/2022    Procedure: Biopsy-Bone;  Surgeon: Porfirio Ponce DPM;  Location: [facility] OR;  Service: Podiatry;  Laterality: Left;    COLONOSCOPY N/A 10/5/2016    Procedure: COLONOSCOPY;  Surgeon: Pillo Chanel MD;  Location: [facility] ENDO;  Service: Endoscopy;  Laterality: N/A;    COLONOSCOPY N/A 4/26/2022    Procedure: COLONOSCOPY;  Surgeon: Saravanan Rachel MD;  Location: [facility] ENDO;  Service: Endoscopy;  Laterality: N/A;    FISTULOGRAM Left 8/24/2022    Procedure: Fistulogram;  Surgeon: Ali Khoobehi, MD;  Location: [facility] CATH/EP LAB;  Service: Vascular;  Laterality: Left;    HERNIA REPAIR Bilateral 11/22/2016    inguinal    HYSTERECTOMY      INCISION AND DRAINAGE FOOT Left 5/13/2022    Procedure: INCISION AND DRAINAGE, FOOT;  Surgeon: Ricardo Bruno DPM;  Location: [facility] OR;  Service: Podiatry;  Laterality: Left;    OOPHORECTOMY      THROMBECTOMY, AV FISTULA, UPPER EXTREMITY, PERCUTANEOUS  8/24/2022    Procedure: THROMBECTOMY, AV FISTULA, UPPER EXTREMITY, PERCUTANEOUS;  Surgeon: Ali Khoobehi, MD;  Location: [facility] CATH/EP LAB;  Service: Vascular;;    TOE AMPUTATION Left 8/26/2022    Procedure: AMPUTATION, TOE;  Surgeon: Porfirio Ponce DPM;  Location: [facility] OR;  Service: Podiatry;  Laterality: Left;     Past Medical History:   Diagnosis Date    A-fib     resolved per patient    Anemia due to end stage renal disease 6/30/2022    Arthritis     Bronchitis     Cardiovascular event risk, ASCVD 10-year risk 6.7% 10/15/2016    Diabetes mellitus type II     Diabetic foot infection     Diabetic ulcer of left midfoot 05/05/2022    Disorder of kidney and ureter     Encounter for blood transfusion     ESRD (end stage renal disease) 05/18/2018    MANJINDER (generalized anxiety disorder) 10/25/2016    History of colon polyps 11/02/2016    Hyperlipidemia     Hypertension     Stroke      Family History   Problem Relation Age of Onset    Hyperlipidemia Mother      Hypertension Mother     Hypertension Father     Diabetes Father     Diabetes Sister     Diabetes Sister     Diabetes Maternal Aunt     Diabetes Maternal Grandmother     Heart disease Maternal Grandmother     Cancer Paternal Grandmother     Breast cancer Neg Hx     Colon cancer Neg Hx     Ovarian cancer Neg Hx         Social History:   Marital Status:   Alcohol History:  reports no history of alcohol use.  Tobacco History:  reports that she has never smoked. She has never used smokeless tobacco.  Drug History:  reports no history of drug use.    Review of patient's allergies indicates:   Allergen Reactions    Dilaudid [hydromorphone] Nausea And Vomiting    Chlorhexidine Itching    Lortab [hydrocodone-acetaminophen] Itching       Current Outpatient Medications   Medication Sig Dispense Refill    acetaminophen (TYLENOL) 325 MG tablet Take 325 mg by mouth every 6 (six) hours.      ALPRAZolam (XANAX) 0.5 MG tablet Take 1 tablet by mouth.      atorvastatin (LIPITOR) 80 MG tablet Take 1 tablet (80 mg total) by mouth once daily. 90 tablet 3    azelastine (ASTELIN) 137 mcg (0.1 %) nasal spray 1 spray (137 mcg total) by Nasal route 2 (two) times daily. 30 mL 3    blood sugar diagnostic Strp To check BG 3 times daily, to use with insurance preferred meter 100 strip 1    blood sugar diagnostic Strp To check BG 4 times daily, to use with insurance preferred meter 450 strip 3    blood sugar diagnostic Strp To check BG 1 times daily, to use with insurance preferred meter 100 each 11    blood-glucose meter kit To check BG 3 times daily, to use with insurance preferred meter 1 each 0    blood-glucose meter kit To check BG 3 times daily, to use with insurance preferred meter 1 each 0    cetirizine (ZYRTEC) 10 MG tablet Take 1 tablet (10 mg total) by mouth every other day. 45 tablet 3    clopidogreL (PLAVIX) 75 mg tablet Take 1 tablet (75 mg total) by mouth once daily. 90 tablet 3    doxycycline (MONODOX) 100 MG capsule Take  "1 capsule (100 mg total) by mouth 2 (two) times daily. 20 capsule 0    epoetin chris-epbx (RETACRIT) 4,000 unit/mL injection Inject 1.11 mLs (4,440 Units total) into the skin every Mon, Wed, Fri.      EScitalopram oxalate (LEXAPRO) 10 MG tablet Take 1 tablet (10 mg total) by mouth once daily. (Patient not taking: Reported on 10/3/2023) 30 tablet 11    fluticasone propionate (FLONASE) 50 mcg/actuation nasal spray 2 sprays (100 mcg total) by Each Nostril route once daily. 16 g 3    gabapentin (NEURONTIN) 100 MG capsule Take 1 capsule (100 mg total) by mouth 2 (two) times daily. 180 capsule 3    hydrALAZINE (APRESOLINE) 25 MG tablet Take 1 tablet (25 mg total) by mouth 2 (two) times daily as needed (Systolic blood pressure > 170 mm Hg).      lancets Misc To check BG 3 times daily, to use with insurance preferred meter 100 each 1    lancets Misc To check BG 1 times daily, to use with insurance preferred meter 100 each 11    minoxidiL (LONITEN) 10 MG Tab Take 10 mg by mouth 2 (two) times daily.      multivitamin (THERAGRAN) per tablet Take 1 tablet by mouth.      OZEMPIC 0.25 mg or 0.5 mg (2 mg/3 mL) pen injector INJECT 0.25 MG SUBCUTANEOUSLY ONCE A WEEK      pen needle, diabetic (BD ULTRA-FINE ROBERT PEN NEEDLE) 32 gauge x 5/32" Ndle 1 pen by Misc.(Non-Drug; Combo Route) route 4 (four) times daily. To use 4 times per day with insulin injections. 100 each 1    sucroferric oxyhydroxide (VELPHORO) 500 mg Chew Take 3 tablets by mouth.       No current facility-administered medications for this visit.       Review of Systems   Constitutional:  Negative for chills, fatigue, fever and unexpected weight change.   HENT:  Negative for hearing loss and trouble swallowing.    Eyes:  Negative for photophobia and visual disturbance.   Respiratory:  Negative for cough, shortness of breath and wheezing.    Cardiovascular:  Negative for chest pain, palpitations and leg swelling.   Gastrointestinal:  Negative for abdominal pain and nausea. "   Genitourinary:  Negative for dysuria and frequency.   Musculoskeletal:  Positive for gait problem. Negative for arthralgias, back pain, joint swelling and myalgias.   Skin:  Positive for wound. Negative for rash.   Neurological:  Positive for numbness. Negative for tremors, seizures, weakness and headaches.   Hematological:  Does not bruise/bleed easily.         Objective:        Physical Exam:   Foot Exam  Physical Exam  Ortho/SPM Exam     Imaging:            Assessment:       1. Ulcer of left foot with fat layer exposed    2. At high risk for inadequate nutritional intake    3. Diabetic foot infection    4. History of TIA (transient ischemic attack)    5. Type 2 diabetes mellitus with chronic kidney disease on chronic dialysis, with long-term current use of insulin    6. Bilateral lower extremity edema    7. Peripheral vascular disease    8. Difficulty in walking, not elsewhere classified    9. Type 2 diabetes mellitus with hypoglycemia and coma, with long-term current use of insulin    10. At risk for readmission to hospital    11. Encounter for diabetic foot exam    12. 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 History of foot ulcer    14. History of amputation of left foot    15. ESRD (end stage renal disease)    16. Decreased pedal pulses    17. Hypertension associated with diabetes    18. Onychogryphosis    19. Tinea pedis of both feet      Plan:   Ulcer of left foot with fat layer exposed    At high risk for inadequate nutritional intake    Diabetic foot infection    History of TIA (transient ischemic attack)    Type 2 diabetes mellitus with chronic kidney disease on chronic dialysis, with long-term current use of insulin    Bilateral lower extremity edema    Peripheral vascular disease    Difficulty in walking, not elsewhere classified    Type 2 diabetes mellitus with hypoglycemia and coma, with long-term current use of insulin    At risk for readmission to hospital    Encounter for  diabetic foot exam    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    Procedures          Counseling:     I provided patient education verbally regarding:   Patient diagnosis, treatment options, as well as alternatives, risks, and benefits.     This note was created using Dragon voice recognition software that occasionally misinterpreted phrases or words.

## 2023-10-10 ENCOUNTER — PATIENT MESSAGE (OUTPATIENT)
Dept: ADMINISTRATIVE | Facility: HOSPITAL | Age: 58
End: 2023-10-10
Payer: MEDICARE

## 2023-10-18 ENCOUNTER — PATIENT MESSAGE (OUTPATIENT)
Dept: ADMINISTRATIVE | Facility: HOSPITAL | Age: 58
End: 2023-10-18
Payer: MEDICARE

## 2023-10-25 NOTE — PATIENT INSTRUCTIONS
Much of the documentation for this visit was completed in the Wound Docs system.  Please see the attached documentation for further details about the patient's care. Scanned under the Media tab.     [facility]  Cardiovascular Special Procedures  Cervical Epidural Steroid Injection  Patient Discharge Instructions      When [address] [facility] should not drive the day of the procedure. You may experience arm weakness during the first 24 hours following the procedure. However, you do not need to stay in bed when you get home. Even if you feel better right away, avoid activities that may strain your neck. Keep in mind that some patients may feel increased pain for the first 24 hours. You should start feeling some pain relief 3-7 days following the injection. This is because the steroid will start working within three days of the injection with maximal effect by one week. At that time, we will evaluate your pain level to determine the need for another steroid injection. Remove bandaid(s) within 24 hours. When to Call Your Doctor    Call right away if you notice any of the following symptoms:    Severe pain or headache;  Fever or chills; Redness or swelling around the injection site. You may contact [address] [place] [address] for any questions or problems that may occur at [phone] during the hours of 9am-4pm Monday-Friday, or the hospital  after hours at [phone], to have the interventional radiologist on call paged. [facility]  Cardiovascular Special Procedures  General Discharge Instructions    ____ You may be drowsy or lightheaded after receiving sedation. DO NOT operate a vehicle (automobile, bicycle, motorcycle, machinery, or power tools), make any important decisions or sign any important/legal documents, or drink alcoholic beverages for the next 24 hours  _x___ We strongly suggest that a responsible adult be with you for the next 24 hours for your protection and safety  ____ If the intravenous catheter site is painful, apply warm wet compresses on the site until the soreness is relieved and elevate the arm above the heart.   Call your

## 2023-11-01 ENCOUNTER — PATIENT MESSAGE (OUTPATIENT)
Dept: ADMINISTRATIVE | Facility: HOSPITAL | Age: 58
End: 2023-11-01
Payer: MEDICARE

## 2023-11-03 ENCOUNTER — PATIENT MESSAGE (OUTPATIENT)
Dept: ADMINISTRATIVE | Facility: HOSPITAL | Age: 58
End: 2023-11-03
Payer: MEDICARE

## 2023-11-26 ENCOUNTER — HOSPITAL ENCOUNTER (EMERGENCY)
Facility: HOSPITAL | Age: 58
Discharge: HOME | End: 2023-11-26
Attending: STUDENT IN AN ORGANIZED HEALTH CARE EDUCATION/TRAINING PROGRAM
Payer: MEDICARE

## 2023-11-26 VITALS
HEART RATE: 83 BPM | DIASTOLIC BLOOD PRESSURE: 93 MMHG | SYSTOLIC BLOOD PRESSURE: 191 MMHG | WEIGHT: 174.63 LBS | RESPIRATION RATE: 16 BRPM | OXYGEN SATURATION: 99 % | BODY MASS INDEX: 27.41 KG/M2 | TEMPERATURE: 98 F | HEIGHT: 67 IN

## 2023-11-26 DIAGNOSIS — E11.628 DIABETIC FOOT INFECTION: Primary | ICD-10-CM

## 2023-11-26 DIAGNOSIS — L08.9 DIABETIC FOOT INFECTION: Primary | ICD-10-CM

## 2023-11-26 LAB
ALBUMIN SERPL BCP-MCNC: 4.6 G/DL (ref 3.5–5.2)
ALP SERPL-CCNC: 104 U/L (ref 55–135)
ALT SERPL W/O P-5'-P-CCNC: 14 U/L (ref 10–44)
ANION GAP SERPL CALC-SCNC: 16 MMOL/L (ref 8–16)
AST SERPL-CCNC: 13 U/L (ref 10–40)
BASOPHILS # BLD AUTO: 0.07 K/UL (ref 0–0.2)
BASOPHILS NFR BLD: 0.8 % (ref 0–1.9)
BILIRUB SERPL-MCNC: 0.5 MG/DL (ref 0.1–1)
BUN SERPL-MCNC: 76 MG/DL (ref 6–20)
CALCIUM SERPL-MCNC: 10.2 MG/DL (ref 8.7–10.5)
CHLORIDE SERPL-SCNC: 99 MMOL/L (ref 95–110)
CO2 SERPL-SCNC: 22 MMOL/L (ref 23–29)
CREAT SERPL-MCNC: 8 MG/DL (ref 0.5–1.4)
CRP SERPL-MCNC: 8 MG/L (ref 0–8.2)
DIFFERENTIAL METHOD: ABNORMAL
EOSINOPHIL # BLD AUTO: 0.2 K/UL (ref 0–0.5)
EOSINOPHIL NFR BLD: 2.1 % (ref 0–8)
ERYTHROCYTE [DISTWIDTH] IN BLOOD BY AUTOMATED COUNT: 13.9 % (ref 11.5–14.5)
ERYTHROCYTE [SEDIMENTATION RATE] IN BLOOD BY WESTERGREN METHOD: 48 MM/HR (ref 0–20)
EST. GFR  (NO RACE VARIABLE): 5.4 ML/MIN/1.73 M^2
GLUCOSE SERPL-MCNC: 169 MG/DL (ref 70–110)
HCT VFR BLD AUTO: 36.5 % (ref 37–48.5)
HGB BLD-MCNC: 11.7 G/DL (ref 12–16)
IMM GRANULOCYTES # BLD AUTO: 0.03 K/UL (ref 0–0.04)
IMM GRANULOCYTES NFR BLD AUTO: 0.3 % (ref 0–0.5)
LYMPHOCYTES # BLD AUTO: 1.8 K/UL (ref 1–4.8)
LYMPHOCYTES NFR BLD: 19.9 % (ref 18–48)
MCH RBC QN AUTO: 28.5 PG (ref 27–31)
MCHC RBC AUTO-ENTMCNC: 32.1 G/DL (ref 32–36)
MCV RBC AUTO: 89 FL (ref 82–98)
MONOCYTES # BLD AUTO: 0.6 K/UL (ref 0.3–1)
MONOCYTES NFR BLD: 6.7 % (ref 4–15)
NEUTROPHILS # BLD AUTO: 6.5 K/UL (ref 1.8–7.7)
NEUTROPHILS NFR BLD: 70.2 % (ref 38–73)
NRBC BLD-RTO: 0 /100 WBC
PLATELET # BLD AUTO: 251 K/UL (ref 150–450)
PMV BLD AUTO: 10.5 FL (ref 9.2–12.9)
POTASSIUM SERPL-SCNC: 5.5 MMOL/L (ref 3.5–5.1)
PROT SERPL-MCNC: 8 G/DL (ref 6–8.4)
RBC # BLD AUTO: 4.1 M/UL (ref 4–5.4)
SODIUM SERPL-SCNC: 137 MMOL/L (ref 136–145)
WBC # BLD AUTO: 9.25 K/UL (ref 3.9–12.7)

## 2023-11-26 PROCEDURE — 25000003 PHARM REV CODE 250: Performed by: STUDENT IN AN ORGANIZED HEALTH CARE EDUCATION/TRAINING PROGRAM

## 2023-11-26 PROCEDURE — 85025 COMPLETE CBC W/AUTO DIFF WBC: CPT | Performed by: STUDENT IN AN ORGANIZED HEALTH CARE EDUCATION/TRAINING PROGRAM

## 2023-11-26 PROCEDURE — 80053 COMPREHEN METABOLIC PANEL: CPT | Performed by: STUDENT IN AN ORGANIZED HEALTH CARE EDUCATION/TRAINING PROGRAM

## 2023-11-26 PROCEDURE — 85651 RBC SED RATE NONAUTOMATED: CPT | Performed by: STUDENT IN AN ORGANIZED HEALTH CARE EDUCATION/TRAINING PROGRAM

## 2023-11-26 PROCEDURE — 99284 EMERGENCY DEPT VISIT MOD MDM: CPT

## 2023-11-26 PROCEDURE — 86140 C-REACTIVE PROTEIN: CPT | Performed by: STUDENT IN AN ORGANIZED HEALTH CARE EDUCATION/TRAINING PROGRAM

## 2023-11-26 RX ORDER — LEVOFLOXACIN 250 MG/1
250 TABLET ORAL DAILY
Qty: 10 TABLET | Refills: 0 | Status: SHIPPED | OUTPATIENT
Start: 2023-11-26 | End: 2023-12-06

## 2023-11-26 RX ORDER — LEVOFLOXACIN 500 MG/1
500 TABLET, FILM COATED ORAL
Status: COMPLETED | OUTPATIENT
Start: 2023-11-26 | End: 2023-11-26

## 2023-11-26 RX ORDER — DOXYCYCLINE 100 MG/1
100 CAPSULE ORAL
Status: COMPLETED | OUTPATIENT
Start: 2023-11-26 | End: 2023-11-26

## 2023-11-26 RX ORDER — DOXYCYCLINE 100 MG/1
100 CAPSULE ORAL 2 TIMES DAILY
Qty: 20 CAPSULE | Refills: 0 | Status: SHIPPED | OUTPATIENT
Start: 2023-11-26 | End: 2023-12-06

## 2023-11-26 RX ADMIN — DOXYCYCLINE HYCLATE 100 MG: 100 CAPSULE ORAL at 09:11

## 2023-11-26 RX ADMIN — LEVOFLOXACIN 500 MG: 500 TABLET, FILM COATED ORAL at 10:11

## 2023-11-26 NOTE — ED PROVIDER NOTES
Encounter Date: 11/26/2023       History     Chief Complaint   Patient presents with    Wound Check     Diabetic foot wounds to left foot.      HPI    58-year-old female with history of diabetes with chronic diabetic foot infections and peripheral neuropathy presenting with new onset diabetic foot wound.  Patient states that these wounds occurred over the past couple of days.  Patient denies any associated erythema or fever.  Pain is described as throbbing but not severe.  Patient was previously following Wound Care but got dismissed after wounds had healed a month ago.    Review of patient's allergies indicates:   Allergen Reactions    Dilaudid [hydromorphone] Nausea And Vomiting    Chlorhexidine Itching    Lortab [hydrocodone-acetaminophen] Itching     Past Medical History:   Diagnosis Date    A-fib     resolved per patient    Anemia due to end stage renal disease 6/30/2022    Arthritis     Bronchitis     Cardiovascular event risk, ASCVD 10-year risk 6.7% 10/15/2016    Diabetes mellitus type II     Diabetic foot infection     Diabetic ulcer of left midfoot 05/05/2022    Disorder of kidney and ureter     Encounter for blood transfusion     ESRD (end stage renal disease) 05/18/2018    MANJINDER (generalized anxiety disorder) 10/25/2016    History of colon polyps 11/02/2016    Hyperlipidemia     Hypertension     Stroke      Past Surgical History:   Procedure Laterality Date    ANGIOGRAPHY OF LOWER EXTREMITY Left 10/18/2022    Procedure: Angiogram Extremity Unilateral;  Surgeon: Ali Khoobehi, MD;  Location: [facility] CATH/EP LAB;  Service: Vascular;  Laterality: Left;    AV FISTULA PLACEMENT Left 8/29/2022    Procedure: CREATION, AV FISTULA;  Surgeon: Ali Khoobehi, MD;  Location: [facility] OR;  Service: Vascular;  Laterality: Left;    BONE BIOPSY Left 8/24/2022    Procedure: Biopsy-Bone;  Surgeon: Porfirio Ponce DPM;  Location: [facility] OR;  Service: Podiatry;  Laterality: Left;    COLONOSCOPY N/A 10/5/2016    Procedure:  COLONOSCOPY;  Surgeon: Pillo Chanel MD;  Location: [facility] ENDO;  Service: Endoscopy;  Laterality: N/A;    COLONOSCOPY N/A 4/26/2022    Procedure: COLONOSCOPY;  Surgeon: Saravanan Rachel MD;  Location: [facility] ENDO;  Service: Endoscopy;  Laterality: N/A;    FISTULOGRAM Left 8/24/2022    Procedure: Fistulogram;  Surgeon: Ali Khoobehi, MD;  Location: [facility] CATH/EP LAB;  Service: Vascular;  Laterality: Left;    HERNIA REPAIR Bilateral 11/22/2016    inguinal    HYSTERECTOMY      INCISION AND DRAINAGE FOOT Left 5/13/2022    Procedure: INCISION AND DRAINAGE, FOOT;  Surgeon: Ricardo Bruno DPM;  Location: [facility] OR;  Service: Podiatry;  Laterality: Left;    OOPHORECTOMY      THROMBECTOMY, AV FISTULA, UPPER EXTREMITY, PERCUTANEOUS  8/24/2022    Procedure: THROMBECTOMY, AV FISTULA, UPPER EXTREMITY, PERCUTANEOUS;  Surgeon: Ali Khoobehi, MD;  Location: [facility] CATH/EP LAB;  Service: Vascular;;    TOE AMPUTATION Left 8/26/2022    Procedure: AMPUTATION, TOE;  Surgeon: Porfirio Ponce DPM;  Location: [facility] OR;  Service: Podiatry;  Laterality: Left;     Family History   Problem Relation Age of Onset    Hyperlipidemia Mother     Hypertension Mother     Hypertension Father     Diabetes Father     Diabetes Sister     Diabetes Sister     Diabetes Maternal Aunt     Diabetes Maternal Grandmother     Heart disease Maternal Grandmother     Cancer Paternal Grandmother     Breast cancer Neg Hx     Colon cancer Neg Hx     Ovarian cancer Neg Hx      Social History     Tobacco Use    Smoking status: Never    Smokeless tobacco: Never   Substance Use Topics    Alcohol use: No    Drug use: No     Review of Systems   Constitutional:  Negative for activity change, appetite change, chills and fever.   Cardiovascular:  Negative for leg swelling.   Skin:  Positive for wound. Negative for rash.       Physical Exam     Initial Vitals   BP Pulse Resp Temp SpO2   11/26/23 0639 11/26/23 0635 11/26/23 0635 11/26/23 0635 11/26/23 0635   (!) 191/93 83 16 97.9 °F  (36.6 °C) 99 %      MAP       --                Physical Exam    Constitutional: She appears well-developed.   Musculoskeletal:         General: No edema.     Neurological: She is alert and oriented to person, place, and time.   Skin: Skin is warm and dry.   Focal left foot skin ulcer to the medial aspect of the 3rd digit with purulent discharge.  No significant surrounding erythema or swelling.  Baseline decreased sensation.  Ulceration to the arch of the foot at previous site of ulcer without any evidence of erythema or purulence.         ED Course   Procedures  Labs Reviewed   CBC W/ AUTO DIFFERENTIAL - Abnormal; Notable for the following components:       Result Value    Hemoglobin 11.7 (*)     Hematocrit 36.5 (*)     All other components within normal limits   COMPREHENSIVE METABOLIC PANEL - Abnormal; Notable for the following components:    Potassium 5.5 (*)     CO2 22 (*)     Glucose 169 (*)     BUN 76 (*)     Creatinine 8.0 (*)     eGFR 5.4 (*)     All other components within normal limits   SEDIMENTATION RATE - Abnormal; Notable for the following components:    Sed Rate 48 (*)     All other components within normal limits   C-REACTIVE PROTEIN          Imaging Results              X-Ray Foot Complete Left (Final result)  Result time 11/26/23 08:37:38      Final result by Kiel Garvey MD (11/26/23 08:37:38)                   Narrative:    Reason: Wound check.    FINDINGS:    3 views of the left foot with comparison radiograph August 21, 2022. Abdomen amputation of the first digit and osteotomy of the head of the first metatarsal. There is an amputation of the fourth distal and middle phalanges with osteotomy of the fourth proximal phalanges. Clotted deformities of the second, third and fifth digits noted. No destructive osseous lesions.    There is severe degenerative changes of the hindfoot and midfoot. Extensive vascular atherosclerosis noted. No other gross soft tissue abnormality  observed.    IMPRESSION:    Postsurgical and degenerative changes of the left foot with no acute osseous abnormality.    Electronically signed by:  Kiel Garvey   11/26/2023 08:37 AM CST Workstation: [id number]                                     Medications   levoFLOXacin tablet 500 mg (has no administration in time range)   doxycycline capsule 100 mg (has no administration in time range)     Medical Decision Making  58-year-old female with history of diabetes in previous diabetic foot ulcers presenting with new diabetic foot ulcer to the left foot.  Vital signs are stable.  Patient afebrile.  Exam as above with focal area of ulceration with purulent discharge to the medial aspect of the left foot.  Ulceration to the arch of the foot as well.  Differential includes local diabetic foot infection, osteomyelitis.  Low suspicion for bacteremia or sepsis given stable exam and vital signs.    ESR mildly elevated.  No leukocytosis on CBC.  Patient with minimally elevated potassium in his scheduled for routine dialysis tomorrow morning.  She is currently asymptomatic.  X-ray without any evidence of osteomyelitis.  Given the skin lesions occurred over the past couple days makes osteomyelitis less likely.  We will initiate treatment with doxycycline and Levaquin.  Patient will need close outpatient follow-up with Dr. Bruno for wound care and follow up.  Strict return precautions with any worsening wound, swelling, redness, or fever.    Benitez Owens MD  Emergency Medicine      Amount and/or Complexity of Data Reviewed  Labs: ordered. Decision-making details documented in ED Course.  Radiology: ordered.    Risk  Prescription drug management.               ED Course as of 11/26/23 0929   Sun Nov 26, 2023   0826 WBC: 9.25 [KH]   0853 Potassium(!): 5.5 [KH]   0853 Creatinine(!): 8.0 [KH]   0853 BUN(!): 76 [KH]   0859 Patient due for dialysis tomorrow.  Currently asymptomatic. [KH]      ED Course User Index  [KAYDEN] Roman  Benitez ALLRED MD                        Clinical Impression:  Final diagnoses:  [E11.628, L08.9] Diabetic foot infection                 Benitez Owens MD  11/26/23 0935

## 2023-11-28 ENCOUNTER — OFFICE VISIT (OUTPATIENT)
Dept: WOUND CARE | Facility: HOSPITAL | Age: 58
End: 2023-11-28
Attending: PODIATRIST
Payer: MEDICARE

## 2023-11-28 VITALS
TEMPERATURE: 98 F | DIASTOLIC BLOOD PRESSURE: 82 MMHG | BODY MASS INDEX: 27.41 KG/M2 | SYSTOLIC BLOOD PRESSURE: 195 MMHG | RESPIRATION RATE: 16 BRPM | WEIGHT: 174.63 LBS | HEIGHT: 67 IN | HEART RATE: 80 BPM

## 2023-11-28 DIAGNOSIS — L97.522 ULCER OF LEFT FOOT WITH FAT LAYER EXPOSED: ICD-10-CM

## 2023-11-28 DIAGNOSIS — I15.2 HYPERTENSION ASSOCIATED WITH DIABETES: ICD-10-CM

## 2023-11-28 DIAGNOSIS — Z79.4 TYPE 2 DIABETES MELLITUS WITH HYPOGLYCEMIA AND COMA, WITH LONG-TERM CURRENT USE OF INSULIN: ICD-10-CM

## 2023-11-28 DIAGNOSIS — L97.523 ULCER OF LEFT FOOT WITH NECROSIS OF MUSCLE: Primary | ICD-10-CM

## 2023-11-28 DIAGNOSIS — L08.9 WOUND INFECTION: ICD-10-CM

## 2023-11-28 DIAGNOSIS — I73.9 PERIPHERAL VASCULAR DISEASE: ICD-10-CM

## 2023-11-28 DIAGNOSIS — R09.89 DECREASED PEDAL PULSES: ICD-10-CM

## 2023-11-28 DIAGNOSIS — E11.59 HYPERTENSION ASSOCIATED WITH DIABETES: ICD-10-CM

## 2023-11-28 DIAGNOSIS — Z86.73 HISTORY OF TIA (TRANSIENT ISCHEMIC ATTACK): ICD-10-CM

## 2023-11-28 DIAGNOSIS — Z91.89 AT RISK FOR READMISSION TO HOSPITAL: ICD-10-CM

## 2023-11-28 DIAGNOSIS — Z99.2 TYPE 2 DIABETES MELLITUS WITH CHRONIC KIDNEY DISEASE ON CHRONIC DIALYSIS, WITH LONG-TERM CURRENT USE OF INSULIN: ICD-10-CM

## 2023-11-28 DIAGNOSIS — Z87.2 HISTORY OF FOOT ULCER: ICD-10-CM

## 2023-11-28 DIAGNOSIS — R26.2 DIFFICULTY IN WALKING, NOT ELSEWHERE CLASSIFIED: ICD-10-CM

## 2023-11-28 DIAGNOSIS — E11.22 TYPE 2 DIABETES MELLITUS WITH CHRONIC KIDNEY DISEASE ON CHRONIC DIALYSIS, WITH LONG-TERM CURRENT USE OF INSULIN: ICD-10-CM

## 2023-11-28 DIAGNOSIS — E11.628 DIABETIC FOOT INFECTION: ICD-10-CM

## 2023-11-28 DIAGNOSIS — R60.0 BILATERAL LOWER EXTREMITY EDEMA: ICD-10-CM

## 2023-11-28 DIAGNOSIS — L03.119 CELLULITIS AND ABSCESS OF FOOT: ICD-10-CM

## 2023-11-28 DIAGNOSIS — N18.6 TYPE 2 DIABETES MELLITUS WITH CHRONIC KIDNEY DISEASE ON CHRONIC DIALYSIS, WITH LONG-TERM CURRENT USE OF INSULIN: ICD-10-CM

## 2023-11-28 DIAGNOSIS — L02.619 CELLULITIS AND ABSCESS OF FOOT: ICD-10-CM

## 2023-11-28 DIAGNOSIS — Z89.432 HISTORY OF AMPUTATION OF LEFT FOOT: ICD-10-CM

## 2023-11-28 DIAGNOSIS — Z87.2 HISTORY OF ULCER OF LOWER EXTREMITY: ICD-10-CM

## 2023-11-28 DIAGNOSIS — E11.40 TYPE 2 DIABETES MELLITUS WITH DIABETIC NEUROPATHY, UNSPECIFIED WHETHER LONG TERM INSULIN USE: ICD-10-CM

## 2023-11-28 DIAGNOSIS — E11.641 TYPE 2 DIABETES MELLITUS WITH HYPOGLYCEMIA AND COMA, WITH LONG-TERM CURRENT USE OF INSULIN: ICD-10-CM

## 2023-11-28 DIAGNOSIS — Z79.4 TYPE 2 DIABETES MELLITUS WITH CHRONIC KIDNEY DISEASE ON CHRONIC DIALYSIS, WITH LONG-TERM CURRENT USE OF INSULIN: ICD-10-CM

## 2023-11-28 DIAGNOSIS — L97.522 SKIN ULCER OF TOE OF LEFT FOOT WITH FAT LAYER EXPOSED: ICD-10-CM

## 2023-11-28 DIAGNOSIS — N18.6 ESRD (END STAGE RENAL DISEASE): ICD-10-CM

## 2023-11-28 DIAGNOSIS — E11.9 ENCOUNTER FOR DIABETIC FOOT EXAM: ICD-10-CM

## 2023-11-28 DIAGNOSIS — L60.2 ONYCHOGRYPHOSIS: ICD-10-CM

## 2023-11-28 DIAGNOSIS — T14.8XXA WOUND INFECTION: ICD-10-CM

## 2023-11-28 DIAGNOSIS — Z91.89 AT HIGH RISK FOR INADEQUATE NUTRITIONAL INTAKE: ICD-10-CM

## 2023-11-28 DIAGNOSIS — L08.9 DIABETIC FOOT INFECTION: ICD-10-CM

## 2023-11-28 DIAGNOSIS — E11.649 TYPE 2 DIABETES MELLITUS WITH HYPOGLYCEMIA UNAWARENESS: ICD-10-CM

## 2023-11-28 DIAGNOSIS — B35.3 TINEA PEDIS OF BOTH FEET: ICD-10-CM

## 2023-11-28 PROCEDURE — 11042 DBRDMT SUBQ TIS 1ST 20SQCM/<: CPT | Mod: 59,PN | Performed by: PODIATRIST

## 2023-11-28 PROCEDURE — 99214 OFFICE O/P EST MOD 30 MIN: CPT | Mod: 25,,, | Performed by: PODIATRIST

## 2023-11-28 PROCEDURE — 11042 PR DEBRIDEMENT, SKIN, SUB-Q TISSUE,=<20 SQ CM: ICD-10-PCS | Mod: 59,,, | Performed by: PODIATRIST

## 2023-11-28 PROCEDURE — 11043 DBRDMT MUSC&/FSCA 1ST 20/<: CPT | Mod: PN | Performed by: PODIATRIST

## 2023-11-28 PROCEDURE — 1159F MED LIST DOCD IN RCRD: CPT | Mod: CPTII,,, | Performed by: PODIATRIST

## 2023-11-28 PROCEDURE — 3052F PR MOST RECENT HEMOGLOBIN A1C LEVEL 8.0 - < 9.0%: ICD-10-PCS | Mod: CPTII,,, | Performed by: PODIATRIST

## 2023-11-28 PROCEDURE — 99214 OFFICE O/P EST MOD 30 MIN: CPT | Mod: 25,PN | Performed by: PODIATRIST

## 2023-11-28 PROCEDURE — 3052F HG A1C>EQUAL 8.0%<EQUAL 9.0%: CPT | Mod: CPTII,,, | Performed by: PODIATRIST

## 2023-11-28 PROCEDURE — 99214 PR OFFICE/OUTPT VISIT, EST, LEVL IV, 30-39 MIN: ICD-10-PCS | Mod: 25,,, | Performed by: PODIATRIST

## 2023-11-28 PROCEDURE — 3008F BODY MASS INDEX DOCD: CPT | Mod: CPTII,,, | Performed by: PODIATRIST

## 2023-11-28 PROCEDURE — 3077F PR MOST RECENT SYSTOLIC BLOOD PRESSURE >= 140 MM HG: ICD-10-PCS | Mod: CPTII,,, | Performed by: PODIATRIST

## 2023-11-28 PROCEDURE — 1160F PR REVIEW ALL MEDS BY PRESCRIBER/CLIN PHARMACIST DOCUMENTED: ICD-10-PCS | Mod: CPTII,,, | Performed by: PODIATRIST

## 2023-11-28 PROCEDURE — 1160F RVW MEDS BY RX/DR IN RCRD: CPT | Mod: CPTII,,, | Performed by: PODIATRIST

## 2023-11-28 PROCEDURE — 87075 CULTR BACTERIA EXCEPT BLOOD: CPT | Performed by: PODIATRIST

## 2023-11-28 PROCEDURE — 3008F PR BODY MASS INDEX (BMI) DOCUMENTED: ICD-10-PCS | Mod: CPTII,,, | Performed by: PODIATRIST

## 2023-11-28 PROCEDURE — 3079F DIAST BP 80-89 MM HG: CPT | Mod: CPTII,,, | Performed by: PODIATRIST

## 2023-11-28 PROCEDURE — 11042 DBRDMT SUBQ TIS 1ST 20SQCM/<: CPT | Mod: 59,,, | Performed by: PODIATRIST

## 2023-11-28 PROCEDURE — 1159F PR MEDICATION LIST DOCUMENTED IN MEDICAL RECORD: ICD-10-PCS | Mod: CPTII,,, | Performed by: PODIATRIST

## 2023-11-28 PROCEDURE — 4010F PR ACE/ARB THEARPY RXD/TAKEN: ICD-10-PCS | Mod: CPTII,,, | Performed by: PODIATRIST

## 2023-11-28 PROCEDURE — 4010F ACE/ARB THERAPY RXD/TAKEN: CPT | Mod: CPTII,,, | Performed by: PODIATRIST

## 2023-11-28 PROCEDURE — 11043 PR DEBRIDEMENT, SKIN, SUB-Q TISSUE,MUSCLE,=<20 SQ CM: ICD-10-PCS | Mod: ,,, | Performed by: PODIATRIST

## 2023-11-28 PROCEDURE — 87070 CULTURE OTHR SPECIMN AEROBIC: CPT | Performed by: PODIATRIST

## 2023-11-28 PROCEDURE — 11043 DBRDMT MUSC&/FSCA 1ST 20/<: CPT | Mod: ,,, | Performed by: PODIATRIST

## 2023-11-28 PROCEDURE — 3077F SYST BP >= 140 MM HG: CPT | Mod: CPTII,,, | Performed by: PODIATRIST

## 2023-11-28 PROCEDURE — 87076 CULTURE ANAEROBE IDENT EACH: CPT | Performed by: PODIATRIST

## 2023-11-28 PROCEDURE — 3079F PR MOST RECENT DIASTOLIC BLOOD PRESSURE 80-89 MM HG: ICD-10-PCS | Mod: CPTII,,, | Performed by: PODIATRIST

## 2023-11-28 NOTE — PROGRESS NOTES
[address]  [place] [address]  Phone: [phone]   [phone]    Patient's PCP:Stefano Lopez MD  Referring Provider: Aaareferral Self    Subjective:      Chief Complaint:: Diabetic Foot Ulcer, Diabetes, Wound Check, Difficulty Walking, Foot Ulcer, Wound Infection, Wound Care, Wound Consult, Non-healing Wound, Pressure Ulcer, Numbness, Peripheral Neuropathy, and Hospital Follow Up    HPI  Leanna García is a 58 y.o. female who presents today with a complaint of left plantar medial foot diabetic foot ulcer, left lateral 2nd toe diabetic foot ulcer, and left medial 3rd toe diabetic foot ulcer.  See wound docs documentation for full assessment and evaluation of all wounds.     Patient recently went to the ER. Reviewed notes from the ER. Patient is currently taking doxycycline and levofloxacin prescribed by the ER  Reviewed all notes from patients medical chart since last visit and recent ER visit, including imaging, procedures, studies, progress notes,  cultures, antibiotic regimens, photos,  etc.       Culture taken of wound- left medial plantar foot.   I will adjust antibiotics accordingly once the results of the culture return      EVALUATION, ASSESSMENT, & PLAN:     1. Debridement of left plantar medial foot diabetic foot ulcer and Debridement of left medial 3rd toe diabetic foot ulcer.    Evaluation and Assessment only of left lateral 2nd toe diabetic foot ulcer.   See Wound Docs for assessment of wounds and procedure notes  2. Continue taking all medications as prescribed  3. Dressing changes, see Wound docs for dressing change orders  4. RTC one week   5. Counseled patient on increasing protein intake, not getting wound wet, keeping dressing clean dry and intact, following a healthy diet, elevating legs when able, removing pressure from wound    Total time spent for E&M 40  Total time for debridement 35 minutes     Proper ulcer care and the possible need for serial  debridements, topical medications, specific dressings and biological engineered skin substitutes if indicated.      Counseling/Education:  I provided patient education verbally regarding:   The aspects of diabetes and how it pertains to the feet. I explained the importance of proper diabetic foot care and how it is essential for the health of their feet.    I discussed the importance of knowing their Hemoglobin A1c and that the level needs to be as close to 6 as possible. I discussed the increase complications of high blood sugar including stroke, blindness, heart attack, kidney failure and loss of limb secondary to neuropathy and PVD.     With neuropathy, beware of any breaks in the skin or redness. These areas are not recognized early due to the numbness.    I discussed Diabetes, lower back issues, metabolic disorders, systemic causes, chemotherapy, vitamin deficiency, heavy metal exposure, as some of the causes. I also explained that as much as 40% of the time we can not find a cause. I discussed different treatments available to control the symptoms but which may not cure the problem.       Counseled patient on the aspects of diabetes and how it pertains to the feet.  I explained the importance of proper diabetic foot care and how it is essential for the health of their feet.      Shoe inspection. Patient instructed on proper foot hygeine. We discussed wearing proper shoe gear, daily foot inspections, never walking without protective shoe gear, never putting sharp instruments to feet, routine podiatric nail visits every 2-3 months.        Patient should call the office immediately if any signs of infection, such as fever, chills, sweats, increased redness or pain.    Patient was instructed to call the clinic or go to the emergency department if their symptoms do not improve, worsens, or if new symptoms develop.  Patient was advised that if any increased swelling, pain, or numbness arise to go immediately to the  "ED. Patient knows to call any time if an emergency arises. Shared decision making occurred and patient verbalized understanding in agreement with this plan.       >50% of this > 60 minute visit was spent face to face educating/counseling the patient    I spent a total of 60 minutes on the day of the visit.This includes face to face time and non-face to face time preparing to see the patient (eg, review of tests), obtaining and/or reviewing separately obtained history, documenting clinical information in the electronic or other health record, independently interpreting results and communicating results to the patient/family/caregiver, or care coordinator.       Much of the documentation for this visit was completed in the Wound Docs system.  Please see the attached documentation for further details about the patient's care. Scanned under the Media tab.        Alivia Bruno DPM       Vitals:    11/28/23 0907   BP: (!) 195/82   Pulse: 80   Resp: 16   Temp: 98.4 °F (36.9 °C)   Weight: 79.2 kg (174 lb 9.7 oz)   Height: 5' 7" (1.702 m)   PainSc: 0-No pain      Shoe Size:     Past Surgical History:   Procedure Laterality Date    ANGIOGRAPHY OF LOWER EXTREMITY Left 10/18/2022    Procedure: Angiogram Extremity Unilateral;  Surgeon: Ali Khoobehi, MD;  Location: [facility] CATH/EP LAB;  Service: Vascular;  Laterality: Left;    AV FISTULA PLACEMENT Left 8/29/2022    Procedure: CREATION, AV FISTULA;  Surgeon: Ali Khoobehi, MD;  Location: [facility] OR;  Service: Vascular;  Laterality: Left;    BONE BIOPSY Left 8/24/2022    Procedure: Biopsy-Bone;  Surgeon: Porfirio Ponce DPM;  Location: [facility] OR;  Service: Podiatry;  Laterality: Left;    COLONOSCOPY N/A 10/5/2016    Procedure: COLONOSCOPY;  Surgeon: Pillo Chanel MD;  Location: [facility] ENDO;  Service: Endoscopy;  Laterality: N/A;    COLONOSCOPY N/A 4/26/2022    Procedure: COLONOSCOPY;  Surgeon: Saravanan Rachel MD;  Location: [facility] ENDO;  Service: Endoscopy;  Laterality: N/A;    " FISTULOGRAM Left 8/24/2022    Procedure: Fistulogram;  Surgeon: Ali Khoobehi, MD;  Location: [facility] CATH/EP LAB;  Service: Vascular;  Laterality: Left;    HERNIA REPAIR Bilateral 11/22/2016    inguinal    HYSTERECTOMY      INCISION AND DRAINAGE FOOT Left 5/13/2022    Procedure: INCISION AND DRAINAGE, FOOT;  Surgeon: Ricardo Bruno DPM;  Location: [facility] OR;  Service: Podiatry;  Laterality: Left;    OOPHORECTOMY      THROMBECTOMY, AV FISTULA, UPPER EXTREMITY, PERCUTANEOUS  8/24/2022    Procedure: THROMBECTOMY, AV FISTULA, UPPER EXTREMITY, PERCUTANEOUS;  Surgeon: Ali Khoobehi, MD;  Location: [facility] CATH/EP LAB;  Service: Vascular;;    TOE AMPUTATION Left 8/26/2022    Procedure: AMPUTATION, TOE;  Surgeon: Porfirio Ponce DPM;  Location: [facility] OR;  Service: Podiatry;  Laterality: Left;     Past Medical History:   Diagnosis Date    A-fib     resolved per patient    Anemia due to end stage renal disease 6/30/2022    Arthritis     Bronchitis     Cardiovascular event risk, ASCVD 10-year risk 6.7% 10/15/2016    Diabetes mellitus type II     Diabetic foot infection     Diabetic ulcer of left midfoot 05/05/2022    Disorder of kidney and ureter     Encounter for blood transfusion     ESRD (end stage renal disease) 05/18/2018    MANJINDER (generalized anxiety disorder) 10/25/2016    History of colon polyps 11/02/2016    Hyperlipidemia     Hypertension     Stroke      Family History   Problem Relation Age of Onset    Hyperlipidemia Mother     Hypertension Mother     Hypertension Father     Diabetes Father     Diabetes Sister     Diabetes Sister     Diabetes Maternal Aunt     Diabetes Maternal Grandmother     Heart disease Maternal Grandmother     Cancer Paternal Grandmother     Breast cancer Neg Hx     Colon cancer Neg Hx     Ovarian cancer Neg Hx         Social History:   Marital Status:   Alcohol History:  reports no history of alcohol use.  Tobacco History:  reports that she has never smoked. She has never used smokeless  tobacco.  Drug History:  reports no history of drug use.    Review of patient's allergies indicates:   Allergen Reactions    Dilaudid [hydromorphone] Nausea And Vomiting    Chlorhexidine Itching    Lortab [hydrocodone-acetaminophen] Itching       Current Outpatient Medications   Medication Sig Dispense Refill    acetaminophen (TYLENOL) 325 MG tablet Take 325 mg by mouth every 6 (six) hours.      ALPRAZolam (XANAX) 0.5 MG tablet Take 1 tablet by mouth.      atorvastatin (LIPITOR) 80 MG tablet Take 1 tablet (80 mg total) by mouth once daily. 90 tablet 3    azelastine (ASTELIN) 137 mcg (0.1 %) nasal spray 1 spray (137 mcg total) by Nasal route 2 (two) times daily. 30 mL 3    blood sugar diagnostic Strp To check BG 3 times daily, to use with insurance preferred meter 100 strip 1    blood sugar diagnostic Strp To check BG 4 times daily, to use with insurance preferred meter 450 strip 3    blood sugar diagnostic Strp To check BG 1 times daily, to use with insurance preferred meter 100 each 11    blood-glucose meter kit To check BG 3 times daily, to use with insurance preferred meter 1 each 0    cetirizine (ZYRTEC) 10 MG tablet Take 1 tablet (10 mg total) by mouth every other day. 45 tablet 3    clopidogreL (PLAVIX) 75 mg tablet Take 1 tablet (75 mg total) by mouth once daily. 90 tablet 3    doxycycline (MONODOX) 100 MG capsule Take 1 capsule (100 mg total) by mouth 2 (two) times daily. 20 capsule 0    doxycycline (VIBRAMYCIN) 100 MG Cap Take 1 capsule (100 mg total) by mouth 2 (two) times daily. for 10 days 20 capsule 0    epoetin chris-epbx (RETACRIT) 4,000 unit/mL injection Inject 1.11 mLs (4,440 Units total) into the skin every Mon, Wed, Fri.      EScitalopram oxalate (LEXAPRO) 10 MG tablet Take 1 tablet (10 mg total) by mouth once daily. (Patient not taking: Reported on 10/3/2023) 30 tablet 11    fluticasone propionate (FLONASE) 50 mcg/actuation nasal spray 2 sprays (100 mcg total) by Each Nostril route once daily. 16  "g 3    gabapentin (NEURONTIN) 100 MG capsule Take 1 capsule (100 mg total) by mouth 2 (two) times daily. 180 capsule 3    hydrALAZINE (APRESOLINE) 25 MG tablet Take 1 tablet (25 mg total) by mouth 2 (two) times daily as needed (Systolic blood pressure > 170 mm Hg).      lancets Misc To check BG 3 times daily, to use with insurance preferred meter 100 each 1    lancets Misc To check BG 1 times daily, to use with insurance preferred meter 100 each 11    levoFLOXacin (LEVAQUIN) 250 MG tablet Take 1 tablet (250 mg total) by mouth once daily. for 10 days 10 tablet 0    minoxidiL (LONITEN) 10 MG Tab Take 10 mg by mouth 2 (two) times daily.      multivitamin (THERAGRAN) per tablet Take 1 tablet by mouth.      OZEMPIC 0.25 mg or 0.5 mg (2 mg/3 mL) pen injector INJECT 0.25 MG SUBCUTANEOUSLY ONCE A WEEK      pen needle, diabetic (BD ULTRA-FINE ROBERT PEN NEEDLE) 32 gauge x 5/32" Ndle 1 pen by Misc.(Non-Drug; Combo Route) route 4 (four) times daily. To use 4 times per day with insulin injections. 100 each 1    sucroferric oxyhydroxide (VELPHORO) 500 mg Chew Take 3 tablets by mouth.       No current facility-administered medications for this visit.       Review of Systems   Constitutional:  Negative for chills, fatigue, fever and unexpected weight change.   HENT:  Negative for hearing loss and trouble swallowing.    Eyes:  Negative for photophobia and visual disturbance.   Respiratory:  Negative for cough, shortness of breath and wheezing.    Cardiovascular:  Negative for chest pain, palpitations and leg swelling.   Gastrointestinal:  Negative for abdominal pain and nausea.   Genitourinary:  Negative for dysuria and frequency.   Musculoskeletal:  Positive for gait problem. Negative for arthralgias, back pain, joint swelling and myalgias.   Skin:  Positive for wound. Negative for rash.   Neurological:  Positive for numbness. Negative for tremors, seizures, weakness and headaches.   Hematological:  Does not bruise/bleed easily.     "     Objective:        Physical Exam:   Foot Exam  Physical Exam  Ortho/SPM Exam     Imaging:            Assessment:       1. Ulcer of left foot with necrosis of muscle    2. Ulcer of left foot with fat layer exposed    3. At high risk for inadequate nutritional intake    4. Diabetic foot infection    5. History of TIA (transient ischemic attack)    6. Type 2 diabetes mellitus with chronic kidney disease on chronic dialysis, with long-term current use of insulin    7. Bilateral lower extremity edema    8. Type 2 diabetes mellitus with hypoglycemia and coma, with long-term current use of insulin    9. At risk for readmission to hospital    10. 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    11. Encounter for diabetic foot exam    12. Peripheral vascular disease    13. Difficulty in walking, not elsewhere classified    14. History of amputation of left foot    15. ESRD (end stage renal disease)    16. History of foot ulcer    17. Decreased pedal pulses    18. Cellulitis and abscess of foot    19. Hypertension associated with diabetes    20. Wound infection    21. Type 2 diabetes mellitus with diabetic neuropathy, unspecified whether long term insulin use    22. Skin ulcer of toe of left foot with fat layer exposed    23. Onychogryphosis    24. Tinea pedis of both feet    25. History of ulcer of lower extremity      Plan:   Ulcer of left foot with necrosis of muscle  -     Culture, Anaerobic  -     CULTURE, AEROBIC  (SPECIFY SOURCE)    Ulcer of left foot with fat layer exposed    At high risk for inadequate nutritional intake    Diabetic foot infection    History of TIA (transient ischemic attack)    Type 2 diabetes mellitus with chronic kidney disease on chronic dialysis, with long-term current use of insulin    Bilateral lower extremity edema    Type 2 diabetes mellitus with hypoglycemia and coma, with long-term current use of insulin    At risk for readmission to hospital    Type 2 diabetes mellitus with  hypoglycemia unawareness    Encounter for diabetic foot exam    Peripheral vascular disease    Difficulty in walking, not elsewhere classified    History of amputation of left foot    ESRD (end stage renal disease)    History of foot ulcer    Decreased pedal pulses    Cellulitis and abscess of foot  -     Culture, Anaerobic  -     CULTURE, AEROBIC  (SPECIFY SOURCE)    Hypertension associated with diabetes    Wound infection    Type 2 diabetes mellitus with diabetic neuropathy, unspecified whether long term insulin use    Skin ulcer of toe of left foot with fat layer exposed    Onychogryphosis    Tinea pedis of both feet    History of ulcer of lower extremity      Follow up in about 1 week (around 12/5/2023).    Procedures          Counseling:     I provided patient education verbally regarding:   Patient diagnosis, treatment options, as well as alternatives, risks, and benefits.     This note was created using Dragon voice recognition software that occasionally misinterpreted phrases or words.

## 2023-12-01 LAB — BACTERIA SPEC AEROBE CULT: NORMAL

## 2023-12-04 LAB — BACTERIA SPEC ANAEROBE CULT: ABNORMAL

## 2023-12-12 ENCOUNTER — OFFICE VISIT (OUTPATIENT)
Dept: FAMILY MEDICINE | Facility: CLINIC | Age: 58
End: 2023-12-12
Payer: MEDICARE

## 2023-12-12 ENCOUNTER — PATIENT MESSAGE (OUTPATIENT)
Dept: ADMINISTRATIVE | Facility: HOSPITAL | Age: 58
End: 2023-12-12
Payer: MEDICARE

## 2023-12-12 ENCOUNTER — OFFICE VISIT (OUTPATIENT)
Dept: WOUND CARE | Facility: HOSPITAL | Age: 58
End: 2023-12-12
Attending: PODIATRIST
Payer: MEDICARE

## 2023-12-12 VITALS
HEART RATE: 73 BPM | RESPIRATION RATE: 18 BRPM | SYSTOLIC BLOOD PRESSURE: 166 MMHG | DIASTOLIC BLOOD PRESSURE: 75 MMHG | TEMPERATURE: 98 F

## 2023-12-12 VITALS
HEART RATE: 65 BPM | SYSTOLIC BLOOD PRESSURE: 140 MMHG | TEMPERATURE: 98 F | WEIGHT: 175.94 LBS | HEIGHT: 67 IN | DIASTOLIC BLOOD PRESSURE: 70 MMHG | OXYGEN SATURATION: 100 % | BODY MASS INDEX: 27.61 KG/M2

## 2023-12-12 DIAGNOSIS — Z91.89 AT RISK FOR READMISSION TO HOSPITAL: ICD-10-CM

## 2023-12-12 DIAGNOSIS — E11.22 TYPE 2 DIABETES MELLITUS WITH CHRONIC KIDNEY DISEASE ON CHRONIC DIALYSIS, WITH LONG-TERM CURRENT USE OF INSULIN: ICD-10-CM

## 2023-12-12 DIAGNOSIS — I73.9 PERIPHERAL VASCULAR DISEASE: ICD-10-CM

## 2023-12-12 DIAGNOSIS — L08.9 DIABETIC FOOT INFECTION: ICD-10-CM

## 2023-12-12 DIAGNOSIS — N18.6 ESRD (END STAGE RENAL DISEASE): ICD-10-CM

## 2023-12-12 DIAGNOSIS — I73.9 PAD (PERIPHERAL ARTERY DISEASE): ICD-10-CM

## 2023-12-12 DIAGNOSIS — N18.6 TYPE 2 DIABETES MELLITUS WITH CHRONIC KIDNEY DISEASE ON CHRONIC DIALYSIS, WITH LONG-TERM CURRENT USE OF INSULIN: ICD-10-CM

## 2023-12-12 DIAGNOSIS — Z87.2 HISTORY OF FOOT ULCER: ICD-10-CM

## 2023-12-12 DIAGNOSIS — Z86.73 HISTORY OF TIA (TRANSIENT ISCHEMIC ATTACK): ICD-10-CM

## 2023-12-12 DIAGNOSIS — Z89.432 HISTORY OF AMPUTATION OF LEFT FOOT: ICD-10-CM

## 2023-12-12 DIAGNOSIS — E11.649 TYPE 2 DIABETES MELLITUS WITH HYPOGLYCEMIA UNAWARENESS: ICD-10-CM

## 2023-12-12 DIAGNOSIS — R26.2 DIFFICULTY IN WALKING, NOT ELSEWHERE CLASSIFIED: ICD-10-CM

## 2023-12-12 DIAGNOSIS — E11.59 HYPERTENSION ASSOCIATED WITH DIABETES: Primary | Chronic | ICD-10-CM

## 2023-12-12 DIAGNOSIS — I15.2 HYPERTENSION ASSOCIATED WITH DIABETES: ICD-10-CM

## 2023-12-12 DIAGNOSIS — E78.5 HYPERLIPIDEMIA ASSOCIATED WITH TYPE 2 DIABETES MELLITUS: ICD-10-CM

## 2023-12-12 DIAGNOSIS — Z91.89 AT HIGH RISK FOR INADEQUATE NUTRITIONAL INTAKE: ICD-10-CM

## 2023-12-12 DIAGNOSIS — R60.0 BILATERAL LOWER EXTREMITY EDEMA: ICD-10-CM

## 2023-12-12 DIAGNOSIS — Z79.4 TYPE 2 DIABETES MELLITUS WITH CHRONIC KIDNEY DISEASE ON CHRONIC DIALYSIS, WITH LONG-TERM CURRENT USE OF INSULIN: ICD-10-CM

## 2023-12-12 DIAGNOSIS — I15.2 HYPERTENSION ASSOCIATED WITH DIABETES: Primary | Chronic | ICD-10-CM

## 2023-12-12 DIAGNOSIS — L97.522 ULCER OF LEFT FOOT WITH FAT LAYER EXPOSED: ICD-10-CM

## 2023-12-12 DIAGNOSIS — R09.89 DECREASED PEDAL PULSES: ICD-10-CM

## 2023-12-12 DIAGNOSIS — L97.423 DIABETIC ULCER OF LEFT MIDFOOT ASSOCIATED WITH TYPE 2 DIABETES MELLITUS, WITH NECROSIS OF MUSCLE: ICD-10-CM

## 2023-12-12 DIAGNOSIS — E11.69 HYPERLIPIDEMIA ASSOCIATED WITH TYPE 2 DIABETES MELLITUS: ICD-10-CM

## 2023-12-12 DIAGNOSIS — Z12.31 BREAST CANCER SCREENING BY MAMMOGRAM: ICD-10-CM

## 2023-12-12 DIAGNOSIS — Z09 HOSPITAL DISCHARGE FOLLOW-UP: ICD-10-CM

## 2023-12-12 DIAGNOSIS — E11.621 DIABETIC ULCER OF LEFT MIDFOOT ASSOCIATED WITH TYPE 2 DIABETES MELLITUS, WITH NECROSIS OF MUSCLE: Primary | ICD-10-CM

## 2023-12-12 DIAGNOSIS — Z76.0 MEDICATION REFILL: ICD-10-CM

## 2023-12-12 DIAGNOSIS — Z79.4 TYPE 2 DIABETES MELLITUS WITH HYPOGLYCEMIA AND COMA, WITH LONG-TERM CURRENT USE OF INSULIN: ICD-10-CM

## 2023-12-12 DIAGNOSIS — B35.3 TINEA PEDIS OF BOTH FEET: ICD-10-CM

## 2023-12-12 DIAGNOSIS — L97.522 SKIN ULCER OF TOE OF LEFT FOOT WITH FAT LAYER EXPOSED: ICD-10-CM

## 2023-12-12 DIAGNOSIS — L97.423 DIABETIC ULCER OF LEFT MIDFOOT ASSOCIATED WITH TYPE 2 DIABETES MELLITUS, WITH NECROSIS OF MUSCLE: Primary | ICD-10-CM

## 2023-12-12 DIAGNOSIS — E11.59 HYPERTENSION ASSOCIATED WITH DIABETES: ICD-10-CM

## 2023-12-12 DIAGNOSIS — Z87.2 HISTORY OF ULCER OF LOWER EXTREMITY: ICD-10-CM

## 2023-12-12 DIAGNOSIS — L60.2 ONYCHOGRYPHOSIS: ICD-10-CM

## 2023-12-12 DIAGNOSIS — E11.40 TYPE 2 DIABETES MELLITUS WITH DIABETIC NEUROPATHY, UNSPECIFIED WHETHER LONG TERM INSULIN USE: ICD-10-CM

## 2023-12-12 DIAGNOSIS — L97.523 ULCER OF LEFT FOOT WITH NECROSIS OF MUSCLE: ICD-10-CM

## 2023-12-12 DIAGNOSIS — L08.9 WOUND INFECTION: ICD-10-CM

## 2023-12-12 DIAGNOSIS — E11.628 DIABETIC FOOT INFECTION: ICD-10-CM

## 2023-12-12 DIAGNOSIS — L02.619 CELLULITIS AND ABSCESS OF FOOT: ICD-10-CM

## 2023-12-12 DIAGNOSIS — T14.8XXA WOUND INFECTION: ICD-10-CM

## 2023-12-12 DIAGNOSIS — E11.621 DIABETIC ULCER OF LEFT MIDFOOT ASSOCIATED WITH TYPE 2 DIABETES MELLITUS, WITH NECROSIS OF MUSCLE: ICD-10-CM

## 2023-12-12 DIAGNOSIS — Z99.2 TYPE 2 DIABETES MELLITUS WITH CHRONIC KIDNEY DISEASE ON CHRONIC DIALYSIS, WITH LONG-TERM CURRENT USE OF INSULIN: ICD-10-CM

## 2023-12-12 DIAGNOSIS — L03.119 CELLULITIS AND ABSCESS OF FOOT: ICD-10-CM

## 2023-12-12 DIAGNOSIS — E11.641 TYPE 2 DIABETES MELLITUS WITH HYPOGLYCEMIA AND COMA, WITH LONG-TERM CURRENT USE OF INSULIN: ICD-10-CM

## 2023-12-12 PROCEDURE — 99213 OFFICE O/P EST LOW 20 MIN: CPT | Mod: S$GLB,,, | Performed by: NURSE PRACTITIONER

## 2023-12-12 PROCEDURE — 1159F MED LIST DOCD IN RCRD: CPT | Mod: CPTII,,, | Performed by: PODIATRIST

## 2023-12-12 PROCEDURE — 3077F SYST BP >= 140 MM HG: CPT | Mod: CPTII,S$GLB,, | Performed by: NURSE PRACTITIONER

## 2023-12-12 PROCEDURE — 3052F PR MOST RECENT HEMOGLOBIN A1C LEVEL 8.0 - < 9.0%: ICD-10-PCS | Mod: CPTII,S$GLB,, | Performed by: NURSE PRACTITIONER

## 2023-12-12 PROCEDURE — 99999 PR PBB SHADOW E&M-EST. PATIENT-LVL V: ICD-10-PCS | Mod: PBBFAC,,, | Performed by: NURSE PRACTITIONER

## 2023-12-12 PROCEDURE — 11042 DBRDMT SUBQ TIS 1ST 20SQCM/<: CPT | Mod: 59,,, | Performed by: PODIATRIST

## 2023-12-12 PROCEDURE — 3052F PR MOST RECENT HEMOGLOBIN A1C LEVEL 8.0 - < 9.0%: ICD-10-PCS | Mod: CPTII,,, | Performed by: PODIATRIST

## 2023-12-12 PROCEDURE — 11042 DBRDMT SUBQ TIS 1ST 20SQCM/<: CPT | Mod: 59,PN | Performed by: PODIATRIST

## 2023-12-12 PROCEDURE — 99214 OFFICE O/P EST MOD 30 MIN: CPT | Mod: 25,,, | Performed by: PODIATRIST

## 2023-12-12 PROCEDURE — 1160F RVW MEDS BY RX/DR IN RCRD: CPT | Mod: CPTII,,, | Performed by: PODIATRIST

## 2023-12-12 PROCEDURE — 3077F PR MOST RECENT SYSTOLIC BLOOD PRESSURE >= 140 MM HG: ICD-10-PCS | Mod: CPTII,,, | Performed by: PODIATRIST

## 2023-12-12 PROCEDURE — 11043 PR DEBRIDEMENT, SKIN, SUB-Q TISSUE,MUSCLE,=<20 SQ CM: ICD-10-PCS | Mod: ,,, | Performed by: PODIATRIST

## 2023-12-12 PROCEDURE — 3078F PR MOST RECENT DIASTOLIC BLOOD PRESSURE < 80 MM HG: ICD-10-PCS | Mod: CPTII,S$GLB,, | Performed by: NURSE PRACTITIONER

## 2023-12-12 PROCEDURE — 3008F PR BODY MASS INDEX (BMI) DOCUMENTED: ICD-10-PCS | Mod: CPTII,S$GLB,, | Performed by: NURSE PRACTITIONER

## 2023-12-12 PROCEDURE — 1160F PR REVIEW ALL MEDS BY PRESCRIBER/CLIN PHARMACIST DOCUMENTED: ICD-10-PCS | Mod: CPTII,S$GLB,, | Performed by: NURSE PRACTITIONER

## 2023-12-12 PROCEDURE — 4010F ACE/ARB THERAPY RXD/TAKEN: CPT | Mod: CPTII,S$GLB,, | Performed by: NURSE PRACTITIONER

## 2023-12-12 PROCEDURE — 3077F PR MOST RECENT SYSTOLIC BLOOD PRESSURE >= 140 MM HG: ICD-10-PCS | Mod: CPTII,S$GLB,, | Performed by: NURSE PRACTITIONER

## 2023-12-12 PROCEDURE — 11043 DBRDMT MUSC&/FSCA 1ST 20/<: CPT | Mod: PN | Performed by: PODIATRIST

## 2023-12-12 PROCEDURE — 4010F ACE/ARB THERAPY RXD/TAKEN: CPT | Mod: CPTII,,, | Performed by: PODIATRIST

## 2023-12-12 PROCEDURE — 3052F HG A1C>EQUAL 8.0%<EQUAL 9.0%: CPT | Mod: CPTII,,, | Performed by: PODIATRIST

## 2023-12-12 PROCEDURE — 4010F PR ACE/ARB THEARPY RXD/TAKEN: ICD-10-PCS | Mod: CPTII,S$GLB,, | Performed by: NURSE PRACTITIONER

## 2023-12-12 PROCEDURE — 11043 DBRDMT MUSC&/FSCA 1ST 20/<: CPT | Mod: ,,, | Performed by: PODIATRIST

## 2023-12-12 PROCEDURE — 1159F MED LIST DOCD IN RCRD: CPT | Mod: CPTII,S$GLB,, | Performed by: NURSE PRACTITIONER

## 2023-12-12 PROCEDURE — 4010F PR ACE/ARB THEARPY RXD/TAKEN: ICD-10-PCS | Mod: CPTII,,, | Performed by: PODIATRIST

## 2023-12-12 PROCEDURE — 3078F PR MOST RECENT DIASTOLIC BLOOD PRESSURE < 80 MM HG: ICD-10-PCS | Mod: CPTII,,, | Performed by: PODIATRIST

## 2023-12-12 PROCEDURE — 3078F DIAST BP <80 MM HG: CPT | Mod: CPTII,,, | Performed by: PODIATRIST

## 2023-12-12 PROCEDURE — 11042 PR DEBRIDEMENT, SKIN, SUB-Q TISSUE,=<20 SQ CM: ICD-10-PCS | Mod: 59,,, | Performed by: PODIATRIST

## 2023-12-12 PROCEDURE — 3078F DIAST BP <80 MM HG: CPT | Mod: CPTII,S$GLB,, | Performed by: NURSE PRACTITIONER

## 2023-12-12 PROCEDURE — 1159F PR MEDICATION LIST DOCUMENTED IN MEDICAL RECORD: ICD-10-PCS | Mod: CPTII,,, | Performed by: PODIATRIST

## 2023-12-12 PROCEDURE — 99999 PR PBB SHADOW E&M-EST. PATIENT-LVL V: CPT | Mod: PBBFAC,,, | Performed by: NURSE PRACTITIONER

## 2023-12-12 PROCEDURE — 1159F PR MEDICATION LIST DOCUMENTED IN MEDICAL RECORD: ICD-10-PCS | Mod: CPTII,S$GLB,, | Performed by: NURSE PRACTITIONER

## 2023-12-12 PROCEDURE — 1160F PR REVIEW ALL MEDS BY PRESCRIBER/CLIN PHARMACIST DOCUMENTED: ICD-10-PCS | Mod: CPTII,,, | Performed by: PODIATRIST

## 2023-12-12 PROCEDURE — 3008F BODY MASS INDEX DOCD: CPT | Mod: CPTII,S$GLB,, | Performed by: NURSE PRACTITIONER

## 2023-12-12 PROCEDURE — 3077F SYST BP >= 140 MM HG: CPT | Mod: CPTII,,, | Performed by: PODIATRIST

## 2023-12-12 PROCEDURE — 99214 PR OFFICE/OUTPT VISIT, EST, LEVL IV, 30-39 MIN: ICD-10-PCS | Mod: 25,,, | Performed by: PODIATRIST

## 2023-12-12 PROCEDURE — 99213 PR OFFICE/OUTPT VISIT, EST, LEVL III, 20-29 MIN: ICD-10-PCS | Mod: S$GLB,,, | Performed by: NURSE PRACTITIONER

## 2023-12-12 PROCEDURE — 3052F HG A1C>EQUAL 8.0%<EQUAL 9.0%: CPT | Mod: CPTII,S$GLB,, | Performed by: NURSE PRACTITIONER

## 2023-12-12 PROCEDURE — 1160F RVW MEDS BY RX/DR IN RCRD: CPT | Mod: CPTII,S$GLB,, | Performed by: NURSE PRACTITIONER

## 2023-12-12 RX ORDER — LANCETS
EACH MISCELLANEOUS
Qty: 100 EACH | Refills: 1 | Status: SHIPPED | OUTPATIENT
Start: 2023-12-12

## 2023-12-12 RX ORDER — SEMAGLUTIDE 0.68 MG/ML
INJECTION, SOLUTION SUBCUTANEOUS
Qty: 1.5 ML | Refills: 2 | Status: SHIPPED | OUTPATIENT
Start: 2023-12-12

## 2023-12-12 RX ORDER — CLOPIDOGREL BISULFATE 75 MG/1
75 TABLET ORAL DAILY
Qty: 90 TABLET | Refills: 3 | Status: SHIPPED | OUTPATIENT
Start: 2023-12-12 | End: 2024-12-11

## 2023-12-12 RX ORDER — ATORVASTATIN CALCIUM 80 MG/1
80 TABLET, FILM COATED ORAL DAILY
Qty: 90 TABLET | Refills: 3 | Status: SHIPPED | OUTPATIENT
Start: 2023-12-12 | End: 2024-12-11

## 2023-12-12 RX ORDER — DOXYCYCLINE 100 MG/1
100 CAPSULE ORAL 2 TIMES DAILY
Qty: 28 CAPSULE | Refills: 0 | Status: SHIPPED | OUTPATIENT
Start: 2023-12-12 | End: 2023-12-26

## 2023-12-12 NOTE — PROGRESS NOTES
Subjective:       Patient ID: Leanna García is a 58 y.o. female.    Chief Complaint: Follow-up     HPI   57 y/o female patient with medical problems listed below presents for 6 months follow up. Patient last seen by pcp was in 6/2023.    Patient is followed by podiatry for chronic diabetic foot infection. Recently seen in ED on 11/26 for left foot ulcer, and given doxycycline and levaquin. She was seen in podiatry for follow up today, and given doxycycline. Sees Dr. Bruno weekly.     She is followed by Cardiology Dr. Adame for HF, hx of A fib, HTN, PAD- On plavix    Patient is on HD on Mon/Wed/Fri    She reports home BS ranges b/w 150s-170s.     Labs reviewed from 11/2023     Patient Active Problem List   Diagnosis    Hyperlipidemia associated with type 2 diabetes mellitus    Chronic left shoulder pain    Hypertension associated with diabetes    History of atrial fibrillation, 2015    Cardiovascular event risk, ASCVD 10-year risk 10.5%, 2015    Chronic diastolic heart failure    MANJINDER (generalized anxiety disorder)    Inguinal hernia    Iron deficiency anemia secondary to inadequate dietary iron intake    Familial hypercholesteremia, baseline     Abdominal obesity    Nocturia    Peripheral edema    Atelectasis    Lack of access to transportation    Hyperlipemia    ESRD (end stage renal disease)    Chronic sinusitis    Type 2 diabetes mellitus with kidney complication, with long-term current use of insulin    History of TIA (transient ischemic attack)    Metabolic acidosis    Type 2 diabetes mellitus with hypoglycemia and coma, with long-term current use of insulin    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    Diabetic ulcer of left midfoot, and right foot    Chronic anemia    Discharge planning issues    MRSA (methicillin resistant Staphylococcus aureus)    Infection due to Acinetobacter species    Encounter for long-term (current) use of antibiotics    PAD (peripheral artery disease)     Ulcer of right foot with fat layer exposed    Ulcer of left foot with necrosis of muscle    Ulcer of left foot with necrosis of bone    Osteomyelitis    Slow transit constipation    Gastroparesis    Diabetic ulcer of left midfoot associated with type 2 diabetes mellitus    Acute hematogenous osteomyelitis of left foot    Diabetic ulcer of right midfoot associated with type 2 diabetes mellitus, limited to breakdown of skin    Precordial pain    Pressure injury of left heel, stage 3        Review of patient's allergies indicates:   Allergen Reactions    Dilaudid [hydromorphone] Nausea And Vomiting    Chlorhexidine Itching    Lortab [hydrocodone-acetaminophen] Itching       Past Surgical History:   Procedure Laterality Date    ANGIOGRAPHY OF LOWER EXTREMITY Left 10/18/2022    Procedure: Angiogram Extremity Unilateral;  Surgeon: Ali Khoobehi, MD;  Location: [facility] CATH/EP LAB;  Service: Vascular;  Laterality: Left;    AV FISTULA PLACEMENT Left 8/29/2022    Procedure: CREATION, AV FISTULA;  Surgeon: Ali Khoobehi, MD;  Location: [facility] OR;  Service: Vascular;  Laterality: Left;    BONE BIOPSY Left 8/24/2022    Procedure: Biopsy-Bone;  Surgeon: Porfirio Ponce DPM;  Location: [facility] OR;  Service: Podiatry;  Laterality: Left;    COLONOSCOPY N/A 10/5/2016    Procedure: COLONOSCOPY;  Surgeon: Pillo Chanel MD;  Location: [facility] ENDO;  Service: Endoscopy;  Laterality: N/A;    COLONOSCOPY N/A 4/26/2022    Procedure: COLONOSCOPY;  Surgeon: Saravanan Rachel MD;  Location: [facility] ENDO;  Service: Endoscopy;  Laterality: N/A;    FISTULOGRAM Left 8/24/2022    Procedure: Fistulogram;  Surgeon: Ali Khoobehi, MD;  Location: [facility] CATH/EP LAB;  Service: Vascular;  Laterality: Left;    HERNIA REPAIR Bilateral 11/22/2016    inguinal    HYSTERECTOMY      INCISION AND DRAINAGE FOOT Left 5/13/2022    Procedure: INCISION AND DRAINAGE, FOOT;  Surgeon: Ricardo Bruno DPM;  Location: [facility] OR;  Service: Podiatry;  Laterality: Left;    OOPHORECTOMY       THROMBECTOMY, AV FISTULA, UPPER EXTREMITY, PERCUTANEOUS  8/24/2022    Procedure: THROMBECTOMY, AV FISTULA, UPPER EXTREMITY, PERCUTANEOUS;  Surgeon: Ali Khoobehi, MD;  Location: [facility] CATH/EP LAB;  Service: Vascular;;    TOE AMPUTATION Left 8/26/2022    Procedure: AMPUTATION, TOE;  Surgeon: Porfirio Ponce DPM;  Location: [facility] OR;  Service: Podiatry;  Laterality: Left;        Current Outpatient Medications:     acetaminophen (TYLENOL) 325 MG tablet, Take 325 mg by mouth every 6 (six) hours., Disp: , Rfl:     ALPRAZolam (XANAX) 0.5 MG tablet, Take 1 tablet by mouth., Disp: , Rfl:     blood sugar diagnostic Strp, To check BG 4 times daily, to use with insurance preferred meter, Disp: 450 strip, Rfl: 3    blood sugar diagnostic Strp, To check BG 1 times daily, to use with insurance preferred meter, Disp: 100 each, Rfl: 11    blood-glucose meter kit, To check BG 3 times daily, to use with insurance preferred meter, Disp: 1 each, Rfl: 0    doxycycline (MONODOX) 100 MG capsule, Take 1 capsule (100 mg total) by mouth 2 (two) times daily., Disp: 20 capsule, Rfl: 0    doxycycline (VIBRAMYCIN) 100 MG Cap, Take 1 capsule (100 mg total) by mouth 2 (two) times daily. for 14 days, Disp: 28 capsule, Rfl: 0    epoetin chris-epbx (RETACRIT) 4,000 unit/mL injection, Inject 1.11 mLs (4,440 Units total) into the skin every Mon, Wed, Fri., Disp: , Rfl:     fluticasone propionate (FLONASE) 50 mcg/actuation nasal spray, 2 sprays (100 mcg total) by Each Nostril route once daily., Disp: 16 g, Rfl: 3    hydrALAZINE (APRESOLINE) 25 MG tablet, Take 1 tablet (25 mg total) by mouth 2 (two) times daily as needed (Systolic blood pressure > 170 mm Hg)., Disp: , Rfl:     lancets Misc, To check BG 1 times daily, to use with insurance preferred meter, Disp: 100 each, Rfl: 11    minoxidiL (LONITEN) 10 MG Tab, Take 10 mg by mouth 2 (two) times daily., Disp: , Rfl:     multivitamin (THERAGRAN) per tablet, Take 1 tablet by mouth., Disp: , Rfl:     pen  "needle, diabetic (BD ULTRA-FINE ROBERT PEN NEEDLE) 32 gauge x 5/32" Ndle, 1 pen by Misc.(Non-Drug; Combo Route) route 4 (four) times daily. To use 4 times per day with insulin injections., Disp: 100 each, Rfl: 1    sucroferric oxyhydroxide (VELPHORO) 500 mg Chew, Take 3 tablets by mouth., Disp: , Rfl:     atorvastatin (LIPITOR) 80 MG tablet, Take 1 tablet (80 mg total) by mouth once daily., Disp: 90 tablet, Rfl: 3    azelastine (ASTELIN) 137 mcg (0.1 %) nasal spray, 1 spray (137 mcg total) by Nasal route 2 (two) times daily., Disp: 30 mL, Rfl: 3    blood sugar diagnostic Strp, To check BG 3 times daily, to use with insurance preferred meter, Disp: 100 strip, Rfl: 1    cetirizine (ZYRTEC) 10 MG tablet, Take 1 tablet (10 mg total) by mouth every other day., Disp: 45 tablet, Rfl: 3    clopidogreL (PLAVIX) 75 mg tablet, Take 1 tablet (75 mg total) by mouth once daily., Disp: 90 tablet, Rfl: 3    EScitalopram oxalate (LEXAPRO) 10 MG tablet, Take 1 tablet (10 mg total) by mouth once daily. (Patient not taking: Reported on 10/3/2023), Disp: 30 tablet, Rfl: 11    gabapentin (NEURONTIN) 100 MG capsule, Take 1 capsule (100 mg total) by mouth 2 (two) times daily., Disp: 180 capsule, Rfl: 3    lancets Misc, To check BG 3 times daily, to use with insurance preferred meter, Disp: 100 each, Rfl: 1    OZEMPIC 0.25 mg or 0.5 mg (2 mg/3 mL) pen injector, INJECT 0.25 MG SUBCUTANEOUSLY ONCE A WEEK, Disp: 1.5 mL, Rfl: 2    Review of Systems   Constitutional:  Negative for chills and fever.   Respiratory:  Negative for cough, chest tightness and shortness of breath.    Cardiovascular:  Negative for chest pain and palpitations.   Gastrointestinal:  Negative for abdominal pain.   Skin:  Positive for wound.   Neurological:  Negative for dizziness and headaches.       Objective:   BP (!) 140/70 (BP Location: Right arm, Patient Position: Sitting, BP Method: Medium (Manual))   Pulse 65   Temp 98.4 °F (36.9 °C) (Oral)   Ht 5' 7" (1.702 m)   " Wt 79.8 kg (175 lb 14.8 oz)   SpO2 100%   BMI 27.55 kg/m²         Physical Exam  Constitutional:       General: She is not in acute distress.     Appearance: Normal appearance.   HENT:      Head: Atraumatic.   Cardiovascular:      Rate and Rhythm: Normal rate and regular rhythm.      Pulses: Normal pulses.      Heart sounds: Normal heart sounds.   Pulmonary:      Effort: Pulmonary effort is normal.      Breath sounds: Normal breath sounds.   Abdominal:      General: Abdomen is flat. Bowel sounds are normal.      Palpations: Abdomen is soft.      Tenderness: There is no abdominal tenderness.   Neurological:      Mental Status: She is oriented to person, place, and time.         Assessment:       1. Hypertension associated with diabetes    2. Type 2 diabetes mellitus with chronic kidney disease on chronic dialysis, with long-term current use of insulin    3. PAD (peripheral artery disease)    4. Hyperlipidemia associated with type 2 diabetes mellitus    5. ESRD (end stage renal disease)    6. Breast cancer screening by mammogram    7. History of TIA (transient ischemic attack)    8. Medication refill    9. Diabetic ulcer of left midfoot associated with type 2 diabetes mellitus, with necrosis of muscle        Plan:       1. Type 2 diabetes mellitus with chronic kidney disease on chronic dialysis, with long-term current use of insulin  - Hemoglobin A1C; Future  - Comprehensive Metabolic Panel; Future  - Ambulatory referral/consult to Ophthalmology; Future  - OZEMPIC 0.25 mg or 0.5 mg (2 mg/3 mL) pen injector; INJECT 0.25 MG SUBCUTANEOUSLY ONCE A WEEK  Dispense: 1.5 mL; Refill: 2    2. PAD (peripheral artery disease)  - On statin and plavix     3. Hypertension associated with diabetes  - Controlled and continue with current regimen   - Comprehensive Metabolic Panel; Future    4. Hyperlipidemia associated with type 2 diabetes mellitus  - atorvastatin (LIPITOR) 80 MG tablet; Take 1 tablet (80 mg total) by mouth once  daily.  Dispense: 90 tablet; Refill: 3  - Lipid Panel; Future    5. ESRD (end stage renal disease)    6. Breast cancer screening by mammogram  - Mammo Digital Screening Bilat w/ Miguel; Future    7. History of TIA (transient ischemic attack)  - atorvastatin (LIPITOR) 80 MG tablet; Take 1 tablet (80 mg total) by mouth once daily.  Dispense: 90 tablet; Refill: 3  - clopidogreL (PLAVIX) 75 mg tablet; Take 1 tablet (75 mg total) by mouth once daily.  Dispense: 90 tablet; Refill: 3    8. Medication refill  - blood sugar diagnostic Strp; To check BG 3 times daily, to use with insurance preferred meter  Dispense: 100 strip; Refill: 1  - lancets Misc; To check BG 3 times daily, to use with insurance preferred meter  Dispense: 100 each; Refill: 1    9. Diabetic ulcer of left midfoot associated with type 2 diabetes mellitus, with necrosis of muscle  - Continue with care as per wound clinic     Patient with be reevaluated in 6 months or sooner tasha Barkley NP

## 2023-12-12 NOTE — PROGRESS NOTES
[address] [facility] [address]  [place] [address]  Phone: [phone]   [phone]    Patient's PCP:Stefano Lopez MD  Referring Provider: Aaarefclayton Self    Subjective:      Chief Complaint:: Wound Care, Diabetes, Wound Check, Wound Infection, Pressure Ulcer, Non-healing Wound, Numbness, Peripheral Neuropathy, Difficulty Walking, Foot Ulcer, and Diabetic Foot Ulcer ( left plantar medial foot diabetic foot ulcer, left lateral 2nd toe diabetic foot ulcer, and left medial 3rd toe diabetic foot ulcer)    NISH García is a 58 y.o. female who presents today for follow up of left plantar medial foot diabetic foot ulcer, left lateral 2nd toe diabetic foot ulcer, and left medial 3rd toe diabetic foot ulcer.  See wound docs documentation for full assessment and evaluation of all wounds.     Reviewed recent culture results. Will continue doxycycline.     Additionally, patient presents following recent hospital admission.     Additionally, patient follows with vascular surgery. Discussed with patient follow up for possible vascular intervention to left lower extremity.       EVALUATION, ASSESSMENT, & PLAN:      1. Debridement of left plantar medial foot diabetic foot ulcer and Debridement of left medial 3rd toe diabetic foot ulcer.    Evaluation and Assessment only of left lateral 2nd toe diabetic foot ulcer (now healed)  See Wound Docs for assessment of wounds and procedure notes  2. Continue taking all medications as prescribed  3. Dressing changes, see Wound docs for dressing change orders  4. RTC one week   5. Counseled patient on increasing protein intake, not getting wound wet, keeping dressing clean dry and intact, following a healthy diet, elevating legs when able, removing pressure from wound     Total time spent for E&M 40  Total time for debridement 35 minutes      Proper ulcer care and the possible need for serial debridements, topical medications, specific dressings and biological  engineered skin substitutes if indicated.        Counseling/Education:  I provided patient education verbally regarding:   The aspects of diabetes and how it pertains to the feet. I explained the importance of proper diabetic foot care and how it is essential for the health of their feet.     I discussed the importance of knowing their Hemoglobin A1c and that the level needs to be as close to 6 as possible. I discussed the increase complications of high blood sugar including stroke, blindness, heart attack, kidney failure and loss of limb secondary to neuropathy and PVD.      With neuropathy, beware of any breaks in the skin or redness. These areas are not recognized early due to the numbness.     I discussed Diabetes, lower back issues, metabolic disorders, systemic causes, chemotherapy, vitamin deficiency, heavy metal exposure, as some of the causes. I also explained that as much as 40% of the time we can not find a cause. I discussed different treatments available to control the symptoms but which may not cure the problem.         Counseled patient on the aspects of diabetes and how it pertains to the feet.  I explained the importance of proper diabetic foot care and how it is essential for the health of their feet.        Shoe inspection. Patient instructed on proper foot hygeine. We discussed wearing proper shoe gear, daily foot inspections, never walking without protective shoe gear, never putting sharp instruments to feet, routine podiatric nail visits every 2-3 months.          Patient should call the office immediately if any signs of infection, such as fever, chills, sweats, increased redness or pain.     Patient was instructed to call the clinic or go to the emergency department if their symptoms do not improve, worsens, or if new symptoms develop.  Patient was advised that if any increased swelling, pain, or numbness arise to go immediately to the ED. Patient knows to call any time if an emergency  arises. Shared decision making occurred and patient verbalized understanding in agreement with this plan.         >50% of this > 60 minute visit was spent face to face educating/counseling the patient     I spent a total of 60 minutes on the day of the visit.This includes face to face time and non-face to face time preparing to see the patient (eg, review of tests), obtaining and/or reviewing separately obtained history, documenting clinical information in the electronic or other health record, independently interpreting results and communicating results to the patient/family/caregiver, or care coordinator.        Much of the documentation for this visit was completed in the Wound Docs system.  Please see the attached documentation for further details about the patient's care. Scanned under the Media tab.         Alivia Bruno DPM     Vitals:    12/12/23 1027   BP: (!) 166/75   Pulse: 73   Resp: 18   Temp: 98.4 °F (36.9 °C)   PainSc: 0-No pain      Shoe Size:     Past Surgical History:   Procedure Laterality Date    ANGIOGRAPHY OF LOWER EXTREMITY Left 10/18/2022    Procedure: Angiogram Extremity Unilateral;  Surgeon: Ali Khoobehi, MD;  Location: [facility] CATH/EP LAB;  Service: Vascular;  Laterality: Left;    AV FISTULA PLACEMENT Left 8/29/2022    Procedure: CREATION, AV FISTULA;  Surgeon: Ali Khoobehi, MD;  Location: [facility] OR;  Service: Vascular;  Laterality: Left;    BONE BIOPSY Left 8/24/2022    Procedure: Biopsy-Bone;  Surgeon: Porfirio Ponce DPM;  Location: [facility] OR;  Service: Podiatry;  Laterality: Left;    COLONOSCOPY N/A 10/5/2016    Procedure: COLONOSCOPY;  Surgeon: Pillo Chanel MD;  Location: [facility] ENDO;  Service: Endoscopy;  Laterality: N/A;    COLONOSCOPY N/A 4/26/2022    Procedure: COLONOSCOPY;  Surgeon: Saravanan Rachel MD;  Location: [facility] ENDO;  Service: Endoscopy;  Laterality: N/A;    FISTULOGRAM Left 8/24/2022    Procedure: Fistulogram;  Surgeon: Ali Khoobehi, MD;  Location: [facility] CATH/EP LAB;   Service: Vascular;  Laterality: Left;    HERNIA REPAIR Bilateral 11/22/2016    inguinal    HYSTERECTOMY      INCISION AND DRAINAGE FOOT Left 5/13/2022    Procedure: INCISION AND DRAINAGE, FOOT;  Surgeon: Ricardo Bruno DPM;  Location: [facility] OR;  Service: Podiatry;  Laterality: Left;    OOPHORECTOMY      THROMBECTOMY, AV FISTULA, UPPER EXTREMITY, PERCUTANEOUS  8/24/2022    Procedure: THROMBECTOMY, AV FISTULA, UPPER EXTREMITY, PERCUTANEOUS;  Surgeon: Ali Khoobehi, MD;  Location: [facility] CATH/EP LAB;  Service: Vascular;;    TOE AMPUTATION Left 8/26/2022    Procedure: AMPUTATION, TOE;  Surgeon: Porfirio Ponce DPM;  Location: [facility] OR;  Service: Podiatry;  Laterality: Left;     Past Medical History:   Diagnosis Date    A-fib     resolved per patient    Anemia due to end stage renal disease 6/30/2022    Arthritis     Bronchitis     Cardiovascular event risk, ASCVD 10-year risk 6.7% 10/15/2016    Diabetes mellitus type II     Diabetic foot infection     Diabetic ulcer of left midfoot 05/05/2022    Disorder of kidney and ureter     Encounter for blood transfusion     ESRD (end stage renal disease) 05/18/2018    MANJINDER (generalized anxiety disorder) 10/25/2016    History of colon polyps 11/02/2016    Hyperlipidemia     Hypertension     Stroke      Family History   Problem Relation Age of Onset    Hyperlipidemia Mother     Hypertension Mother     Hypertension Father     Diabetes Father     Diabetes Sister     Diabetes Sister     Diabetes Maternal Aunt     Diabetes Maternal Grandmother     Heart disease Maternal Grandmother     Cancer Paternal Grandmother     Breast cancer Neg Hx     Colon cancer Neg Hx     Ovarian cancer Neg Hx         Social History:   Marital Status:   Alcohol History:  reports no history of alcohol use.  Tobacco History:  reports that she has never smoked. She has never used smokeless tobacco.  Drug History:  reports no history of drug use.    Review of patient's allergies indicates:   Allergen  Reactions    Dilaudid [hydromorphone] Nausea And Vomiting    Chlorhexidine Itching    Lortab [hydrocodone-acetaminophen] Itching       Current Outpatient Medications   Medication Sig Dispense Refill    acetaminophen (TYLENOL) 325 MG tablet Take 325 mg by mouth every 6 (six) hours.      ALPRAZolam (XANAX) 0.5 MG tablet Take 1 tablet by mouth.      atorvastatin (LIPITOR) 80 MG tablet Take 1 tablet (80 mg total) by mouth once daily. 90 tablet 3    azelastine (ASTELIN) 137 mcg (0.1 %) nasal spray 1 spray (137 mcg total) by Nasal route 2 (two) times daily. 30 mL 3    blood sugar diagnostic Strp To check BG 4 times daily, to use with insurance preferred meter 450 strip 3    blood sugar diagnostic Strp To check BG 1 times daily, to use with insurance preferred meter 100 each 11    blood sugar diagnostic Strp To check BG 3 times daily, to use with insurance preferred meter 100 strip 1    blood-glucose meter kit To check BG 3 times daily, to use with insurance preferred meter 1 each 0    cetirizine (ZYRTEC) 10 MG tablet Take 1 tablet (10 mg total) by mouth every other day. 45 tablet 3    clopidogreL (PLAVIX) 75 mg tablet Take 1 tablet (75 mg total) by mouth once daily. 90 tablet 3    doxycycline (MONODOX) 100 MG capsule Take 1 capsule (100 mg total) by mouth 2 (two) times daily. 20 capsule 0    doxycycline (VIBRAMYCIN) 100 MG Cap Take 1 capsule (100 mg total) by mouth 2 (two) times daily. for 14 days 28 capsule 0    epoetin chris-epbx (RETACRIT) 4,000 unit/mL injection Inject 1.11 mLs (4,440 Units total) into the skin every Mon, Wed, Fri.      EScitalopram oxalate (LEXAPRO) 10 MG tablet Take 1 tablet (10 mg total) by mouth once daily. (Patient not taking: Reported on 10/3/2023) 30 tablet 11    fluticasone propionate (FLONASE) 50 mcg/actuation nasal spray 2 sprays (100 mcg total) by Each Nostril route once daily. 16 g 3    gabapentin (NEURONTIN) 100 MG capsule Take 1 capsule (100 mg total) by mouth 2 (two) times daily. 180  "capsule 3    hydrALAZINE (APRESOLINE) 25 MG tablet Take 1 tablet (25 mg total) by mouth 2 (two) times daily as needed (Systolic blood pressure > 170 mm Hg).      lancets Misc To check BG 1 times daily, to use with insurance preferred meter 100 each 11    lancets Misc To check BG 3 times daily, to use with insurance preferred meter 100 each 1    minoxidiL (LONITEN) 10 MG Tab Take 10 mg by mouth 2 (two) times daily.      multivitamin (THERAGRAN) per tablet Take 1 tablet by mouth.      OZEMPIC 0.25 mg or 0.5 mg (2 mg/3 mL) pen injector INJECT 0.25 MG SUBCUTANEOUSLY ONCE A WEEK 1.5 mL 2    pen needle, diabetic (BD ULTRA-FINE ROBERT PEN NEEDLE) 32 gauge x 5/32" Ndle 1 pen by Misc.(Non-Drug; Combo Route) route 4 (four) times daily. To use 4 times per day with insulin injections. 100 each 1    sucroferric oxyhydroxide (VELPHORO) 500 mg Chew Take 3 tablets by mouth.       No current facility-administered medications for this visit.       Review of Systems   Constitutional:  Negative for chills, fatigue, fever and unexpected weight change.   HENT:  Negative for hearing loss and trouble swallowing.    Eyes:  Negative for photophobia and visual disturbance.   Respiratory:  Negative for cough, shortness of breath and wheezing.    Cardiovascular:  Negative for chest pain, palpitations and leg swelling.   Gastrointestinal:  Negative for abdominal pain and nausea.   Genitourinary:  Negative for dysuria and frequency.   Musculoskeletal:  Positive for gait problem. Negative for arthralgias, back pain, joint swelling and myalgias.   Skin:  Positive for wound. Negative for rash.   Neurological:  Positive for numbness. Negative for tremors, seizures, weakness and headaches.   Hematological:  Does not bruise/bleed easily.         Objective:        Physical Exam:   Foot Exam  Physical Exam  Ortho/SPM Exam     Imaging:            Assessment:       1. Diabetic ulcer of left midfoot associated with type 2 diabetes mellitus, with necrosis of " muscle    2. Ulcer of left foot with fat layer exposed    3. At high risk for inadequate nutritional intake    4. Hospital discharge follow-up    5. Bilateral lower extremity edema    6. Diabetic foot infection    7. Type 2 diabetes mellitus with chronic kidney disease on chronic dialysis, with long-term current use of insulin    8. 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 At risk for readmission to hospital    10. History of TIA (transient ischemic attack)    11. Type 2 diabetes mellitus with hypoglycemia and coma, with long-term current use of insulin    12. Peripheral vascular disease    13. Difficulty in walking, not elsewhere classified    14. History of foot ulcer    15. ESRD (end stage renal disease)    16. History of amputation of left foot    17. Decreased pedal pulses    18. Ulcer of left foot with necrosis of muscle    19. Cellulitis and abscess of foot    20. Wound infection    21. Type 2 diabetes mellitus with diabetic neuropathy, unspecified whether long term insulin use    22. Tinea pedis of both feet    23. Skin ulcer of toe of left foot with fat layer exposed    24. Onychogryphosis    25. Hypertension associated with diabetes    26. 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 for 14 days  Dispense: 28 capsule; Refill: 0    Wound infection    Type 2 diabetes mellitus with diabetic neuropathy, unspecified whether long term insulin use    Tinea pedis of both feet    Skin ulcer of toe of left foot with fat layer exposed    Onychogryphosis    Hypertension associated with diabetes    History of ulcer of lower extremity      Follow up in about 1 week (around 12/19/2023).    Procedures          Counseling:     I provided patient education verbally regarding:   Patient diagnosis, treatment options, as well as alternatives, risks, and benefits.     This note was created using Dragon voice recognition software that occasionally misinterpreted phrases or words.

## 2023-12-13 ENCOUNTER — PATIENT MESSAGE (OUTPATIENT)
Dept: ADMINISTRATIVE | Facility: HOSPITAL | Age: 58
End: 2023-12-13
Payer: MEDICARE

## 2023-12-18 ENCOUNTER — PATIENT MESSAGE (OUTPATIENT)
Dept: ADMINISTRATIVE | Facility: HOSPITAL | Age: 58
End: 2023-12-18
Payer: MEDICARE

## 2023-12-19 ENCOUNTER — OFFICE VISIT (OUTPATIENT)
Dept: WOUND CARE | Facility: HOSPITAL | Age: 58
End: 2023-12-19
Attending: PODIATRIST
Payer: MEDICARE

## 2023-12-19 VITALS
HEART RATE: 67 BPM | TEMPERATURE: 98 F | DIASTOLIC BLOOD PRESSURE: 90 MMHG | RESPIRATION RATE: 18 BRPM | SYSTOLIC BLOOD PRESSURE: 170 MMHG

## 2023-12-19 DIAGNOSIS — R09.89 DECREASED PEDAL PULSES: ICD-10-CM

## 2023-12-19 DIAGNOSIS — B35.3 TINEA PEDIS OF BOTH FEET: ICD-10-CM

## 2023-12-19 DIAGNOSIS — E11.621 DIABETIC ULCER OF LEFT MIDFOOT ASSOCIATED WITH TYPE 2 DIABETES MELLITUS, WITH NECROSIS OF MUSCLE: ICD-10-CM

## 2023-12-19 DIAGNOSIS — Z89.432 HISTORY OF AMPUTATION OF LEFT FOOT: ICD-10-CM

## 2023-12-19 DIAGNOSIS — E11.40 TYPE 2 DIABETES MELLITUS WITH DIABETIC NEUROPATHY, UNSPECIFIED WHETHER LONG TERM INSULIN USE: ICD-10-CM

## 2023-12-19 DIAGNOSIS — Z91.89 AT HIGH RISK FOR INADEQUATE NUTRITIONAL INTAKE: ICD-10-CM

## 2023-12-19 DIAGNOSIS — L60.2 ONYCHOGRYPHOSIS: ICD-10-CM

## 2023-12-19 DIAGNOSIS — L97.523 ULCER OF LEFT FOOT WITH NECROSIS OF MUSCLE: Primary | ICD-10-CM

## 2023-12-19 DIAGNOSIS — Z79.4 TYPE 2 DIABETES MELLITUS WITH CHRONIC KIDNEY DISEASE ON CHRONIC DIALYSIS, WITH LONG-TERM CURRENT USE OF INSULIN: ICD-10-CM

## 2023-12-19 DIAGNOSIS — L08.9 DIABETIC FOOT INFECTION: ICD-10-CM

## 2023-12-19 DIAGNOSIS — E11.59 HYPERTENSION ASSOCIATED WITH DIABETES: ICD-10-CM

## 2023-12-19 DIAGNOSIS — Z91.89 AT RISK FOR READMISSION TO HOSPITAL: ICD-10-CM

## 2023-12-19 DIAGNOSIS — N18.6 TYPE 2 DIABETES MELLITUS WITH CHRONIC KIDNEY DISEASE ON CHRONIC DIALYSIS, WITH LONG-TERM CURRENT USE OF INSULIN: ICD-10-CM

## 2023-12-19 DIAGNOSIS — N18.6 ESRD (END STAGE RENAL DISEASE): ICD-10-CM

## 2023-12-19 DIAGNOSIS — Z87.2 HISTORY OF ULCER OF LOWER EXTREMITY: ICD-10-CM

## 2023-12-19 DIAGNOSIS — Z86.73 HISTORY OF TIA (TRANSIENT ISCHEMIC ATTACK): ICD-10-CM

## 2023-12-19 DIAGNOSIS — E11.628 DIABETIC FOOT INFECTION: ICD-10-CM

## 2023-12-19 DIAGNOSIS — I73.9 PERIPHERAL VASCULAR DISEASE: ICD-10-CM

## 2023-12-19 DIAGNOSIS — Z87.2 HISTORY OF FOOT ULCER: ICD-10-CM

## 2023-12-19 DIAGNOSIS — I15.2 HYPERTENSION ASSOCIATED WITH DIABETES: ICD-10-CM

## 2023-12-19 DIAGNOSIS — R26.2 DIFFICULTY IN WALKING, NOT ELSEWHERE CLASSIFIED: ICD-10-CM

## 2023-12-19 DIAGNOSIS — T14.8XXA WOUND INFECTION: ICD-10-CM

## 2023-12-19 DIAGNOSIS — L97.423 DIABETIC ULCER OF LEFT MIDFOOT ASSOCIATED WITH TYPE 2 DIABETES MELLITUS, WITH NECROSIS OF MUSCLE: ICD-10-CM

## 2023-12-19 DIAGNOSIS — E11.641 TYPE 2 DIABETES MELLITUS WITH HYPOGLYCEMIA AND COMA, WITH LONG-TERM CURRENT USE OF INSULIN: ICD-10-CM

## 2023-12-19 DIAGNOSIS — Z99.2 TYPE 2 DIABETES MELLITUS WITH CHRONIC KIDNEY DISEASE ON CHRONIC DIALYSIS, WITH LONG-TERM CURRENT USE OF INSULIN: ICD-10-CM

## 2023-12-19 DIAGNOSIS — E11.22 TYPE 2 DIABETES MELLITUS WITH CHRONIC KIDNEY DISEASE ON CHRONIC DIALYSIS, WITH LONG-TERM CURRENT USE OF INSULIN: ICD-10-CM

## 2023-12-19 DIAGNOSIS — Z79.4 TYPE 2 DIABETES MELLITUS WITH HYPOGLYCEMIA AND COMA, WITH LONG-TERM CURRENT USE OF INSULIN: ICD-10-CM

## 2023-12-19 DIAGNOSIS — E11.649 TYPE 2 DIABETES MELLITUS WITH HYPOGLYCEMIA UNAWARENESS: ICD-10-CM

## 2023-12-19 DIAGNOSIS — Z09 HOSPITAL DISCHARGE FOLLOW-UP: ICD-10-CM

## 2023-12-19 DIAGNOSIS — L08.9 WOUND INFECTION: ICD-10-CM

## 2023-12-19 DIAGNOSIS — R60.0 BILATERAL LOWER EXTREMITY EDEMA: ICD-10-CM

## 2023-12-19 PROCEDURE — 11043 DBRDMT MUSC&/FSCA 1ST 20/<: CPT | Mod: ,,, | Performed by: PODIATRIST

## 2023-12-19 PROCEDURE — 3077F SYST BP >= 140 MM HG: CPT | Mod: CPTII,,, | Performed by: PODIATRIST

## 2023-12-19 PROCEDURE — 1159F MED LIST DOCD IN RCRD: CPT | Mod: CPTII,,, | Performed by: PODIATRIST

## 2023-12-19 PROCEDURE — 11043 PR DEBRIDEMENT, SKIN, SUB-Q TISSUE,MUSCLE,=<20 SQ CM: ICD-10-PCS | Mod: ,,, | Performed by: PODIATRIST

## 2023-12-19 PROCEDURE — 1159F PR MEDICATION LIST DOCUMENTED IN MEDICAL RECORD: ICD-10-PCS | Mod: CPTII,,, | Performed by: PODIATRIST

## 2023-12-19 PROCEDURE — 99214 PR OFFICE/OUTPT VISIT, EST, LEVL IV, 30-39 MIN: ICD-10-PCS | Mod: 25,,, | Performed by: PODIATRIST

## 2023-12-19 PROCEDURE — 3080F PR MOST RECENT DIASTOLIC BLOOD PRESSURE >= 90 MM HG: ICD-10-PCS | Mod: CPTII,,, | Performed by: PODIATRIST

## 2023-12-19 PROCEDURE — 3052F PR MOST RECENT HEMOGLOBIN A1C LEVEL 8.0 - < 9.0%: ICD-10-PCS | Mod: CPTII,,, | Performed by: PODIATRIST

## 2023-12-19 PROCEDURE — 4010F ACE/ARB THERAPY RXD/TAKEN: CPT | Mod: CPTII,,, | Performed by: PODIATRIST

## 2023-12-19 PROCEDURE — 11043 DBRDMT MUSC&/FSCA 1ST 20/<: CPT | Mod: PN | Performed by: PODIATRIST

## 2023-12-19 PROCEDURE — 99214 OFFICE O/P EST MOD 30 MIN: CPT | Mod: 25,,, | Performed by: PODIATRIST

## 2023-12-19 PROCEDURE — 3080F DIAST BP >= 90 MM HG: CPT | Mod: CPTII,,, | Performed by: PODIATRIST

## 2023-12-19 PROCEDURE — 1160F PR REVIEW ALL MEDS BY PRESCRIBER/CLIN PHARMACIST DOCUMENTED: ICD-10-PCS | Mod: CPTII,,, | Performed by: PODIATRIST

## 2023-12-19 PROCEDURE — 4010F PR ACE/ARB THEARPY RXD/TAKEN: ICD-10-PCS | Mod: CPTII,,, | Performed by: PODIATRIST

## 2023-12-19 PROCEDURE — 1160F RVW MEDS BY RX/DR IN RCRD: CPT | Mod: CPTII,,, | Performed by: PODIATRIST

## 2023-12-19 PROCEDURE — 3052F HG A1C>EQUAL 8.0%<EQUAL 9.0%: CPT | Mod: CPTII,,, | Performed by: PODIATRIST

## 2023-12-19 PROCEDURE — 3077F PR MOST RECENT SYSTOLIC BLOOD PRESSURE >= 140 MM HG: ICD-10-PCS | Mod: CPTII,,, | Performed by: PODIATRIST

## 2023-12-19 RX ORDER — DOXYCYCLINE 100 MG/1
100 CAPSULE ORAL 2 TIMES DAILY
Qty: 28 CAPSULE | Refills: 0 | Status: SHIPPED | OUTPATIENT
Start: 2023-12-19 | End: 2024-01-02

## 2023-12-19 NOTE — PROGRESS NOTES
[address] [facility] [address]  [place] [address]  Phone: [phone]   [phone]    Patient's PCP:Stefano Lopez MD  Referring Provider: Aaarefclayton Self    Subjective:      Chief Complaint:: Wound Care, Diabetes, Diabetic Foot Ulcer, Wound Check, Foot Ulcer, Difficulty Walking, Non-healing Wound, Pressure Ulcer, Numbness, and Peripheral Neuropathy    HPI  Leanna Gacría is a 58 y.o. female who presents today for follow up of left plantar medial foot diabetic foot ulcer, left lateral 2nd toe diabetic foot ulcer, and left medial 3rd toe diabetic foot ulcer.  See wound docs documentation for full assessment and evaluation of all wounds.      Reviewed recent culture results. Will continue doxycycline.      Additionally, patient presents following recent hospital admission.      Additionally, patient follows with vascular surgery. Discussed with patient follow up for possible vascular intervention to left lower extremity.       EVALUATION, ASSESSMENT, & PLAN:      1. Debridement of left plantar medial foot diabetic foot ulcer.  Evaluation and Assessment only of left medial 3rd toe diabetic foot ulcer.   See Wound Docs for assessment of wounds and procedure notes  2. Continue taking all medications as prescribed  3. Dressing changes, see Wound docs for dressing change orders  4. RTC one week   5. Counseled patient on increasing protein intake, not getting wound wet, keeping dressing clean dry and intact, following a healthy diet, elevating legs when able, removing pressure from wound     Total time spent for E&M 40  Total time for debridement 35 minutes      Proper ulcer care and the possible need for serial debridements, topical medications, specific dressings and biological engineered skin substitutes if indicated.        Counseling/Education:  I provided patient education verbally regarding:   The aspects of diabetes and how it pertains to the feet. I explained the importance of proper diabetic foot  care and how it is essential for the health of their feet.     I discussed the importance of knowing their Hemoglobin A1c and that the level needs to be as close to 6 as possible. I discussed the increase complications of high blood sugar including stroke, blindness, heart attack, kidney failure and loss of limb secondary to neuropathy and PVD.      With neuropathy, beware of any breaks in the skin or redness. These areas are not recognized early due to the numbness.     I discussed Diabetes, lower back issues, metabolic disorders, systemic causes, chemotherapy, vitamin deficiency, heavy metal exposure, as some of the causes. I also explained that as much as 40% of the time we can not find a cause. I discussed different treatments available to control the symptoms but which may not cure the problem.         Counseled patient on the aspects of diabetes and how it pertains to the feet.  I explained the importance of proper diabetic foot care and how it is essential for the health of their feet.        Shoe inspection. Patient instructed on proper foot hygeine. We discussed wearing proper shoe gear, daily foot inspections, never walking without protective shoe gear, never putting sharp instruments to feet, routine podiatric nail visits every 2-3 months.          Patient should call the office immediately if any signs of infection, such as fever, chills, sweats, increased redness or pain.     Patient was instructed to call the clinic or go to the emergency department if their symptoms do not improve, worsens, or if new symptoms develop.  Patient was advised that if any increased swelling, pain, or numbness arise to go immediately to the ED. Patient knows to call any time if an emergency arises. Shared decision making occurred and patient verbalized understanding in agreement with this plan.         >50% of this > 60 minute visit was spent face to face educating/counseling the patient     I spent a total of 60 minutes on  the day of the visit.This includes face to face time and non-face to face time preparing to see the patient (eg, review of tests), obtaining and/or reviewing separately obtained history, documenting clinical information in the electronic or other health record, independently interpreting results and communicating results to the patient/family/caregiver, or care coordinator.        Much of the documentation for this visit was completed in the Wound Docs system.  Please see the attached documentation for further details about the patient's care. Scanned under the Media tab.         Alivia Bruno DPM     Vitals:    12/19/23 1034   BP: (!) 170/90   Pulse: 67   Resp: 18   Temp: 98.4 °F (36.9 °C)   PainSc: 0-No pain      Shoe Size:     Past Surgical History:   Procedure Laterality Date    ANGIOGRAPHY OF LOWER EXTREMITY Left 10/18/2022    Procedure: Angiogram Extremity Unilateral;  Surgeon: Ali Khoobehi, MD;  Location: [facility] CATH/EP LAB;  Service: Vascular;  Laterality: Left;    AV FISTULA PLACEMENT Left 8/29/2022    Procedure: CREATION, AV FISTULA;  Surgeon: Ali Khoobehi, MD;  Location: [facility] OR;  Service: Vascular;  Laterality: Left;    BONE BIOPSY Left 8/24/2022    Procedure: Biopsy-Bone;  Surgeon: Porfirio Ponce DPM;  Location: [facility];  Service: Podiatry;  Laterality: Left;    COLONOSCOPY N/A 10/5/2016    Procedure: COLONOSCOPY;  Surgeon: Pillo Chanel MD;  Location: [facility] ENDO;  Service: Endoscopy;  Laterality: N/A;    COLONOSCOPY N/A 4/26/2022    Procedure: COLONOSCOPY;  Surgeon: Saravanan Rachel MD;  Location: [facility] ENDO;  Service: Endoscopy;  Laterality: N/A;    FISTULOGRAM Left 8/24/2022    Procedure: Fistulogram;  Surgeon: Ali Khoobehi, MD;  Location: [facility] CATH/EP LAB;  Service: Vascular;  Laterality: Left;    HERNIA REPAIR Bilateral 11/22/2016    inguinal    HYSTERECTOMY      INCISION AND DRAINAGE FOOT Left 5/13/2022    Procedure: INCISION AND DRAINAGE, FOOT;  Surgeon: Ricardo Bruno DPM;  Location: [facility]  OR;  Service: Podiatry;  Laterality: Left;    OOPHORECTOMY      THROMBECTOMY, AV FISTULA, UPPER EXTREMITY, PERCUTANEOUS  8/24/2022    Procedure: THROMBECTOMY, AV FISTULA, UPPER EXTREMITY, PERCUTANEOUS;  Surgeon: Ali Khoobehi, MD;  Location: [facility] CATH/EP LAB;  Service: Vascular;;    TOE AMPUTATION Left 8/26/2022    Procedure: AMPUTATION, TOE;  Surgeon: Porfirio Ponce DPM;  Location: [facility] OR;  Service: Podiatry;  Laterality: Left;     Past Medical History:   Diagnosis Date    A-fib     resolved per patient    Anemia due to end stage renal disease 6/30/2022    Arthritis     Bronchitis     Cardiovascular event risk, ASCVD 10-year risk 6.7% 10/15/2016    Diabetes mellitus type II     Diabetic foot infection     Diabetic ulcer of left midfoot 05/05/2022    Disorder of kidney and ureter     Encounter for blood transfusion     ESRD (end stage renal disease) 05/18/2018    MANJINDER (generalized anxiety disorder) 10/25/2016    History of colon polyps 11/02/2016    Hyperlipidemia     Hypertension     Stroke      Family History   Problem Relation Age of Onset    Hyperlipidemia Mother     Hypertension Mother     Hypertension Father     Diabetes Father     Diabetes Sister     Diabetes Sister     Diabetes Maternal Aunt     Diabetes Maternal Grandmother     Heart disease Maternal Grandmother     Cancer Paternal Grandmother     Breast cancer Neg Hx     Colon cancer Neg Hx     Ovarian cancer Neg Hx         Social History:   Marital Status:   Alcohol History:  reports no history of alcohol use.  Tobacco History:  reports that she has never smoked. She has never used smokeless tobacco.  Drug History:  reports no history of drug use.    Review of patient's allergies indicates:   Allergen Reactions    Dilaudid [hydromorphone] Nausea And Vomiting    Chlorhexidine Itching    Lortab [hydrocodone-acetaminophen] Itching       Current Outpatient Medications   Medication Sig Dispense Refill    acetaminophen (TYLENOL) 325 MG tablet Take  325 mg by mouth every 6 (six) hours.      ALPRAZolam (XANAX) 0.5 MG tablet Take 1 tablet by mouth.      atorvastatin (LIPITOR) 80 MG tablet Take 1 tablet (80 mg total) by mouth once daily. 90 tablet 3    azelastine (ASTELIN) 137 mcg (0.1 %) nasal spray 1 spray (137 mcg total) by Nasal route 2 (two) times daily. 30 mL 3    blood sugar diagnostic Strp To check BG 4 times daily, to use with insurance preferred meter 450 strip 3    blood sugar diagnostic Strp To check BG 1 times daily, to use with insurance preferred meter 100 each 11    blood sugar diagnostic Strp To check BG 3 times daily, to use with insurance preferred meter 100 strip 1    blood-glucose meter kit To check BG 3 times daily, to use with insurance preferred meter 1 each 0    cetirizine (ZYRTEC) 10 MG tablet Take 1 tablet (10 mg total) by mouth every other day. 45 tablet 3    clopidogreL (PLAVIX) 75 mg tablet Take 1 tablet (75 mg total) by mouth once daily. 90 tablet 3    doxycycline (MONODOX) 100 MG capsule Take 1 capsule (100 mg total) by mouth 2 (two) times daily. 20 capsule 0    doxycycline (VIBRAMYCIN) 100 MG Cap Take 1 capsule (100 mg total) by mouth 2 (two) times daily. for 14 days 28 capsule 0    doxycycline (VIBRAMYCIN) 100 MG Cap Take 1 capsule (100 mg total) by mouth 2 (two) times daily. for 14 days 28 capsule 0    epoetin chris-epbx (RETACRIT) 4,000 unit/mL injection Inject 1.11 mLs (4,440 Units total) into the skin every Mon, Wed, Fri.      EScitalopram oxalate (LEXAPRO) 10 MG tablet Take 1 tablet (10 mg total) by mouth once daily. (Patient not taking: Reported on 10/3/2023) 30 tablet 11    fluticasone propionate (FLONASE) 50 mcg/actuation nasal spray 2 sprays (100 mcg total) by Each Nostril route once daily. 16 g 3    gabapentin (NEURONTIN) 100 MG capsule Take 1 capsule (100 mg total) by mouth 2 (two) times daily. 180 capsule 3    hydrALAZINE (APRESOLINE) 25 MG tablet Take 1 tablet (25 mg total) by mouth 2 (two) times daily as needed  "(Systolic blood pressure > 170 mm Hg).      lancets Misc To check BG 1 times daily, to use with insurance preferred meter 100 each 11    lancets Misc To check BG 3 times daily, to use with insurance preferred meter 100 each 1    minoxidiL (LONITEN) 10 MG Tab Take 10 mg by mouth 2 (two) times daily.      multivitamin (THERAGRAN) per tablet Take 1 tablet by mouth.      OZEMPIC 0.25 mg or 0.5 mg (2 mg/3 mL) pen injector INJECT 0.25 MG SUBCUTANEOUSLY ONCE A WEEK 1.5 mL 2    pen needle, diabetic (BD ULTRA-FINE ROBERT PEN NEEDLE) 32 gauge x 5/32" Ndle 1 pen by Misc.(Non-Drug; Combo Route) route 4 (four) times daily. To use 4 times per day with insulin injections. 100 each 1    sucroferric oxyhydroxide (VELPHORO) 500 mg Chew Take 3 tablets by mouth.       No current facility-administered medications for this visit.       Review of Systems   Constitutional:  Negative for chills, fatigue, fever and unexpected weight change.   HENT:  Negative for hearing loss and trouble swallowing.    Eyes:  Negative for photophobia and visual disturbance.   Respiratory:  Negative for cough, shortness of breath and wheezing.    Cardiovascular:  Negative for chest pain, palpitations and leg swelling.   Gastrointestinal:  Negative for abdominal pain and nausea.   Genitourinary:  Negative for dysuria and frequency.   Musculoskeletal:  Positive for gait problem. Negative for arthralgias, back pain, joint swelling and myalgias.   Skin:  Positive for wound. Negative for rash.   Neurological:  Positive for numbness. Negative for tremors, seizures, weakness and headaches.   Hematological:  Does not bruise/bleed easily.         Objective:        Physical Exam:   Foot Exam  Physical Exam  Ortho/SPM Exam     Imaging:            Assessment:       1. Ulcer of left foot with necrosis of muscle    2. Type 2 diabetes mellitus with chronic kidney disease on chronic dialysis, with long-term current use of insulin    3. Diabetic foot infection    4. Peripheral " vascular disease    5. At high risk for inadequate nutritional intake    6. Hospital discharge follow-up    7. History of TIA (transient ischemic attack)    8. At risk for readmission to hospital    9. Difficulty in walking, not elsewhere classified    10. Type 2 diabetes mellitus with hypoglycemia and coma, with long-term current use of insulin    11. 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 Bilateral lower extremity edema    13. History of foot ulcer    14. History of amputation of left foot    15. ESRD (end stage renal disease)    16. Type 2 diabetes mellitus with diabetic neuropathy, unspecified whether long term insulin use    17. Tinea pedis of both feet    18. Wound infection    19. Onychogryphosis    20. Decreased pedal pulses    21. Hypertension associated with diabetes    22. History of ulcer of lower extremity    23. Diabetic ulcer of left midfoot associated with type 2 diabetes mellitus, with necrosis of muscle      Plan:   Ulcer of left foot with necrosis of muscle    Type 2 diabetes mellitus with chronic kidney disease on chronic dialysis, with long-term current use of insulin    Diabetic foot infection    Peripheral vascular disease    At high risk for inadequate nutritional intake    Hospital discharge follow-up    History of TIA (transient ischemic attack)    At risk for readmission to hospital    Difficulty in walking, not elsewhere classified    Type 2 diabetes mellitus with hypoglycemia and coma, with long-term current use of insulin    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    Bilateral lower extremity edema    History of foot ulcer    History of amputation of left foot    ESRD (end stage renal disease)    Type 2 diabetes mellitus with diabetic neuropathy, unspecified whether long term insulin use    Tinea pedis of both feet    Wound infection    Onychogryphosis    Decreased pedal pulses    Hypertension associated with diabetes    History of ulcer of lower  extremity    Diabetic ulcer of left midfoot associated with type 2 diabetes mellitus, with necrosis of muscle    Other orders  -     doxycycline (VIBRAMYCIN) 100 MG Cap; Take 1 capsule (100 mg total) by mouth 2 (two) times daily. for 14 days  Dispense: 28 capsule; Refill: 0      Follow up in about 2 weeks (around 1/2/2024).    Procedures          Counseling:     I provided patient education verbally regarding:   Patient diagnosis, treatment options, as well as alternatives, risks, and benefits.     This note was created using Dragon voice recognition software that occasionally misinterpreted phrases or words.

## 2023-12-27 ENCOUNTER — PATIENT MESSAGE (OUTPATIENT)
Dept: ADMINISTRATIVE | Facility: HOSPITAL | Age: 58
End: 2023-12-27
Payer: MEDICARE

## 2023-12-29 ENCOUNTER — PATIENT MESSAGE (OUTPATIENT)
Dept: ADMINISTRATIVE | Facility: CLINIC | Age: 58
End: 2023-12-29
Payer: MEDICARE

## 2024-01-02 ENCOUNTER — OFFICE VISIT (OUTPATIENT)
Dept: WOUND CARE | Facility: HOSPITAL | Age: 59
End: 2024-01-02
Attending: PODIATRIST
Payer: MEDICARE

## 2024-01-02 VITALS
DIASTOLIC BLOOD PRESSURE: 99 MMHG | RESPIRATION RATE: 19 BRPM | SYSTOLIC BLOOD PRESSURE: 196 MMHG | TEMPERATURE: 99 F | HEART RATE: 88 BPM

## 2024-01-02 DIAGNOSIS — Z91.89 AT RISK FOR READMISSION TO HOSPITAL: ICD-10-CM

## 2024-01-02 DIAGNOSIS — Z09 HOSPITAL DISCHARGE FOLLOW-UP: ICD-10-CM

## 2024-01-02 DIAGNOSIS — Z86.73 HISTORY OF TIA (TRANSIENT ISCHEMIC ATTACK): ICD-10-CM

## 2024-01-02 DIAGNOSIS — N18.6 TYPE 2 DIABETES MELLITUS WITH CHRONIC KIDNEY DISEASE ON CHRONIC DIALYSIS, WITH LONG-TERM CURRENT USE OF INSULIN: ICD-10-CM

## 2024-01-02 DIAGNOSIS — N18.6 ESRD (END STAGE RENAL DISEASE): ICD-10-CM

## 2024-01-02 DIAGNOSIS — E11.22 TYPE 2 DIABETES MELLITUS WITH CHRONIC KIDNEY DISEASE ON CHRONIC DIALYSIS, WITH LONG-TERM CURRENT USE OF INSULIN: ICD-10-CM

## 2024-01-02 DIAGNOSIS — E11.649 TYPE 2 DIABETES MELLITUS WITH HYPOGLYCEMIA UNAWARENESS: ICD-10-CM

## 2024-01-02 DIAGNOSIS — L08.9 DIABETIC FOOT INFECTION: ICD-10-CM

## 2024-01-02 DIAGNOSIS — Z91.89 AT HIGH RISK FOR INADEQUATE NUTRITIONAL INTAKE: ICD-10-CM

## 2024-01-02 DIAGNOSIS — E11.641 TYPE 2 DIABETES MELLITUS WITH HYPOGLYCEMIA AND COMA, WITH LONG-TERM CURRENT USE OF INSULIN: ICD-10-CM

## 2024-01-02 DIAGNOSIS — R60.0 BILATERAL LOWER EXTREMITY EDEMA: ICD-10-CM

## 2024-01-02 DIAGNOSIS — Z87.2 HISTORY OF FOOT ULCER: ICD-10-CM

## 2024-01-02 DIAGNOSIS — R09.89 DECREASED PEDAL PULSES: ICD-10-CM

## 2024-01-02 DIAGNOSIS — E11.59 HYPERTENSION ASSOCIATED WITH DIABETES: ICD-10-CM

## 2024-01-02 DIAGNOSIS — E11.621 DIABETIC ULCER OF LEFT MIDFOOT ASSOCIATED WITH TYPE 2 DIABETES MELLITUS, WITH NECROSIS OF MUSCLE: ICD-10-CM

## 2024-01-02 DIAGNOSIS — B35.3 TINEA PEDIS OF BOTH FEET: ICD-10-CM

## 2024-01-02 DIAGNOSIS — Z99.2 TYPE 2 DIABETES MELLITUS WITH CHRONIC KIDNEY DISEASE ON CHRONIC DIALYSIS, WITH LONG-TERM CURRENT USE OF INSULIN: ICD-10-CM

## 2024-01-02 DIAGNOSIS — Z79.4 TYPE 2 DIABETES MELLITUS WITH CHRONIC KIDNEY DISEASE ON CHRONIC DIALYSIS, WITH LONG-TERM CURRENT USE OF INSULIN: ICD-10-CM

## 2024-01-02 DIAGNOSIS — Z87.2 HISTORY OF ULCER OF LOWER EXTREMITY: ICD-10-CM

## 2024-01-02 DIAGNOSIS — E11.40 TYPE 2 DIABETES MELLITUS WITH DIABETIC NEUROPATHY, UNSPECIFIED WHETHER LONG TERM INSULIN USE: ICD-10-CM

## 2024-01-02 DIAGNOSIS — Z89.432 HISTORY OF AMPUTATION OF LEFT FOOT: ICD-10-CM

## 2024-01-02 DIAGNOSIS — L97.423 DIABETIC ULCER OF LEFT MIDFOOT ASSOCIATED WITH TYPE 2 DIABETES MELLITUS, WITH NECROSIS OF MUSCLE: ICD-10-CM

## 2024-01-02 DIAGNOSIS — R26.2 DIFFICULTY IN WALKING, NOT ELSEWHERE CLASSIFIED: ICD-10-CM

## 2024-01-02 DIAGNOSIS — I15.2 HYPERTENSION ASSOCIATED WITH DIABETES: ICD-10-CM

## 2024-01-02 DIAGNOSIS — L60.2 ONYCHOGRYPHOSIS: ICD-10-CM

## 2024-01-02 DIAGNOSIS — L97.523 ULCER OF LEFT FOOT WITH NECROSIS OF MUSCLE: Primary | ICD-10-CM

## 2024-01-02 DIAGNOSIS — E11.628 DIABETIC FOOT INFECTION: ICD-10-CM

## 2024-01-02 DIAGNOSIS — L97.522 ULCER OF LEFT FOOT WITH FAT LAYER EXPOSED: ICD-10-CM

## 2024-01-02 DIAGNOSIS — L97.522 SKIN ULCER OF TOE OF LEFT FOOT WITH FAT LAYER EXPOSED: ICD-10-CM

## 2024-01-02 DIAGNOSIS — Z79.4 TYPE 2 DIABETES MELLITUS WITH HYPOGLYCEMIA AND COMA, WITH LONG-TERM CURRENT USE OF INSULIN: ICD-10-CM

## 2024-01-02 DIAGNOSIS — I73.9 PERIPHERAL VASCULAR DISEASE: ICD-10-CM

## 2024-01-02 PROCEDURE — 1160F RVW MEDS BY RX/DR IN RCRD: CPT | Mod: HCNC,CPTII,, | Performed by: PODIATRIST

## 2024-01-02 PROCEDURE — 1159F MED LIST DOCD IN RCRD: CPT | Mod: HCNC,CPTII,, | Performed by: PODIATRIST

## 2024-01-02 PROCEDURE — 3080F DIAST BP >= 90 MM HG: CPT | Mod: HCNC,CPTII,, | Performed by: PODIATRIST

## 2024-01-02 PROCEDURE — 99214 OFFICE O/P EST MOD 30 MIN: CPT | Mod: 25,HCNC,, | Performed by: PODIATRIST

## 2024-01-02 PROCEDURE — 3077F SYST BP >= 140 MM HG: CPT | Mod: HCNC,CPTII,, | Performed by: PODIATRIST

## 2024-01-02 PROCEDURE — 11042 DBRDMT SUBQ TIS 1ST 20SQCM/<: CPT | Mod: HCNC,,, | Performed by: PODIATRIST

## 2024-01-02 PROCEDURE — 11042 DBRDMT SUBQ TIS 1ST 20SQCM/<: CPT | Mod: HCNC,PN | Performed by: PODIATRIST

## 2024-01-02 NOTE — PROGRESS NOTES
[address] [facility] [address]  [place] [address]  Phone: [phone]   [phone]    Patient's PCP:Stefano Lopez MD  Referring Provider: Aaareferral Self    Subjective:      Chief Complaint:: Wound Care, Diabetic Foot Ulcer, Pressure Ulcer, Wound Consult, Diabetes, Difficulty Walking, Foot Ulcer, Non-healing Wound, Wound Check, Numbness, and Peripheral Neuropathy    HPI  Leanna García is a 58 y.o. female who presents today for follow up of left plantar medial foot diabetic foot ulcer and left medial 3rd toe diabetic foot ulcer.  See wound docs documentation for full assessment and evaluation of all wounds.      Additionally, patient follows with vascular surgery. Discussed with patient follow up for possible vascular intervention to left lower extremity.       EVALUATION, ASSESSMENT, & PLAN:      1. Debridement of left plantar medial foot diabetic foot ulcer.  Evaluation and Assessment only of left medial 3rd toe diabetic foot ulcer.   See Wound Docs for assessment of wounds and procedure notes  2. Continue taking all medications as prescribed  3. Dressing changes, see Wound docs for dressing change orders  4. RTC one week   5. Counseled patient on increasing protein intake, not getting wound wet, keeping dressing clean dry and intact, following a healthy diet, elevating legs when able, removing pressure from wound     Total time spent for E&M 40  Total time for debridement 35 minutes      Proper ulcer care and the possible need for serial debridements, topical medications, specific dressings and biological engineered skin substitutes if indicated.        Counseling/Education:  I provided patient education verbally regarding:   The aspects of diabetes and how it pertains to the feet. I explained the importance of proper diabetic foot care and how it is essential for the health of their feet.     I discussed the importance of knowing their Hemoglobin A1c and that the level needs to be as close to  6 as possible. I discussed the increase complications of high blood sugar including stroke, blindness, heart attack, kidney failure and loss of limb secondary to neuropathy and PVD.      With neuropathy, beware of any breaks in the skin or redness. These areas are not recognized early due to the numbness.     I discussed Diabetes, lower back issues, metabolic disorders, systemic causes, chemotherapy, vitamin deficiency, heavy metal exposure, as some of the causes. I also explained that as much as 40% of the time we can not find a cause. I discussed different treatments available to control the symptoms but which may not cure the problem.         Counseled patient on the aspects of diabetes and how it pertains to the feet.  I explained the importance of proper diabetic foot care and how it is essential for the health of their feet.        Shoe inspection. Patient instructed on proper foot hygeine. We discussed wearing proper shoe gear, daily foot inspections, never walking without protective shoe gear, never putting sharp instruments to feet, routine podiatric nail visits every 2-3 months.          Patient should call the office immediately if any signs of infection, such as fever, chills, sweats, increased redness or pain.     Patient was instructed to call the clinic or go to the emergency department if their symptoms do not improve, worsens, or if new symptoms develop.  Patient was advised that if any increased swelling, pain, or numbness arise to go immediately to the ED. Patient knows to call any time if an emergency arises. Shared decision making occurred and patient verbalized understanding in agreement with this plan.         >50% of this > 60 minute visit was spent face to face educating/counseling the patient     I spent a total of 60 minutes on the day of the visit.This includes face to face time and non-face to face time preparing to see the patient (eg, review of tests), obtaining and/or reviewing  separately obtained history, documenting clinical information in the electronic or other health record, independently interpreting results and communicating results to the patient/family/caregiver, or care coordinator.        Much of the documentation for this visit was completed in the Wound Docs system.  Please see the attached documentation for further details about the patient's care. Scanned under the Media tab.         Alivia Bruno DPM     Vitals:    01/02/24 0919   BP: (!) 196/99   Pulse: 88   Resp: 19   Temp: 98.5 °F (36.9 °C)   TempSrc: Temporal   PainSc: 0-No pain   PainLoc: Foot      Shoe Size:     Past Surgical History:   Procedure Laterality Date    ANGIOGRAPHY OF LOWER EXTREMITY Left 10/18/2022    Procedure: Angiogram Extremity Unilateral;  Surgeon: Ali Khoobehi, MD;  Location: [facility] CATH/EP LAB;  Service: Vascular;  Laterality: Left;    AV FISTULA PLACEMENT Left 8/29/2022    Procedure: CREATION, AV FISTULA;  Surgeon: Ali Khoobehi, MD;  Location: [facility] OR;  Service: Vascular;  Laterality: Left;    BONE BIOPSY Left 8/24/2022    Procedure: Biopsy-Bone;  Surgeon: Porfirio Ponce DPM;  Location: [facility] OR;  Service: Podiatry;  Laterality: Left;    COLONOSCOPY N/A 10/5/2016    Procedure: COLONOSCOPY;  Surgeon: Pillo Chanel MD;  Location: [facility] ENDO;  Service: Endoscopy;  Laterality: N/A;    COLONOSCOPY N/A 4/26/2022    Procedure: COLONOSCOPY;  Surgeon: Saravanan Rachel MD;  Location: [facility] ENDO;  Service: Endoscopy;  Laterality: N/A;    FISTULOGRAM Left 8/24/2022    Procedure: Fistulogram;  Surgeon: Ali Khoobehi, MD;  Location: [facility] CATH/EP LAB;  Service: Vascular;  Laterality: Left;    HERNIA REPAIR Bilateral 11/22/2016    inguinal    HYSTERECTOMY      INCISION AND DRAINAGE FOOT Left 5/13/2022    Procedure: INCISION AND DRAINAGE, FOOT;  Surgeon: Ricardo Bruno DPM;  Location: [facility] OR;  Service: Podiatry;  Laterality: Left;    OOPHORECTOMY      THROMBECTOMY, AV FISTULA, UPPER EXTREMITY, PERCUTANEOUS   8/24/2022    Procedure: THROMBECTOMY, AV FISTULA, UPPER EXTREMITY, PERCUTANEOUS;  Surgeon: Ali Khoobehi, MD;  Location: [facility] CATH/EP LAB;  Service: Vascular;;    TOE AMPUTATION Left 8/26/2022    Procedure: AMPUTATION, TOE;  Surgeon: Porfirio Ponce DPM;  Location: [facility] OR;  Service: Podiatry;  Laterality: Left;     Past Medical History:   Diagnosis Date    A-fib     resolved per patient    Anemia due to end stage renal disease 6/30/2022    Arthritis     Bronchitis     Cardiovascular event risk, ASCVD 10-year risk 6.7% 10/15/2016    Diabetes mellitus type II     Diabetic foot infection     Diabetic ulcer of left midfoot 05/05/2022    Disorder of kidney and ureter     Encounter for blood transfusion     ESRD (end stage renal disease) 05/18/2018    MANJINDER (generalized anxiety disorder) 10/25/2016    History of colon polyps 11/02/2016    Hyperlipidemia     Hypertension     Stroke      Family History   Problem Relation Age of Onset    Hyperlipidemia Mother     Hypertension Mother     Hypertension Father     Diabetes Father     Diabetes Sister     Diabetes Sister     Diabetes Maternal Aunt     Diabetes Maternal Grandmother     Heart disease Maternal Grandmother     Cancer Paternal Grandmother     Breast cancer Neg Hx     Colon cancer Neg Hx     Ovarian cancer Neg Hx         Social History:   Marital Status:   Alcohol History:  reports no history of alcohol use.  Tobacco History:  reports that she has never smoked. She has never used smokeless tobacco.  Drug History:  reports no history of drug use.    Review of patient's allergies indicates:   Allergen Reactions    Dilaudid [hydromorphone] Nausea And Vomiting    Chlorhexidine Itching    Lortab [hydrocodone-acetaminophen] Itching       Current Outpatient Medications   Medication Sig Dispense Refill    acetaminophen (TYLENOL) 325 MG tablet Take 325 mg by mouth every 6 (six) hours.      ALPRAZolam (XANAX) 0.5 MG tablet Take 1 tablet by mouth.      atorvastatin  (LIPITOR) 80 MG tablet Take 1 tablet (80 mg total) by mouth once daily. 90 tablet 3    azelastine (ASTELIN) 137 mcg (0.1 %) nasal spray 1 spray (137 mcg total) by Nasal route 2 (two) times daily. 30 mL 3    blood sugar diagnostic Strp To check BG 4 times daily, to use with insurance preferred meter 450 strip 3    blood sugar diagnostic Strp To check BG 1 times daily, to use with insurance preferred meter 100 each 11    blood sugar diagnostic Strp To check BG 3 times daily, to use with insurance preferred meter 100 strip 1    blood-glucose meter kit To check BG 3 times daily, to use with insurance preferred meter 1 each 0    cetirizine (ZYRTEC) 10 MG tablet Take 1 tablet (10 mg total) by mouth every other day. 45 tablet 3    clopidogreL (PLAVIX) 75 mg tablet Take 1 tablet (75 mg total) by mouth once daily. 90 tablet 3    doxycycline (MONODOX) 100 MG capsule Take 1 capsule (100 mg total) by mouth 2 (two) times daily. 20 capsule 0    doxycycline (VIBRAMYCIN) 100 MG Cap Take 1 capsule (100 mg total) by mouth 2 (two) times daily. for 14 days 28 capsule 0    epoetin chris-epbx (RETACRIT) 4,000 unit/mL injection Inject 1.11 mLs (4,440 Units total) into the skin every Mon, Wed, Fri.      EScitalopram oxalate (LEXAPRO) 10 MG tablet Take 1 tablet (10 mg total) by mouth once daily. (Patient not taking: Reported on 10/3/2023) 30 tablet 11    fluticasone propionate (FLONASE) 50 mcg/actuation nasal spray 2 sprays (100 mcg total) by Each Nostril route once daily. 16 g 3    gabapentin (NEURONTIN) 100 MG capsule Take 1 capsule (100 mg total) by mouth 2 (two) times daily. 180 capsule 3    hydrALAZINE (APRESOLINE) 25 MG tablet Take 1 tablet (25 mg total) by mouth 2 (two) times daily as needed (Systolic blood pressure > 170 mm Hg).      lancets Misc To check BG 1 times daily, to use with insurance preferred meter 100 each 11    lancets Misc To check BG 3 times daily, to use with insurance preferred meter 100 each 1    minoxidiL  "(LONITEN) 10 MG Tab Take 10 mg by mouth 2 (two) times daily.      multivitamin (THERAGRAN) per tablet Take 1 tablet by mouth.      OZEMPIC 0.25 mg or 0.5 mg (2 mg/3 mL) pen injector INJECT 0.25 MG SUBCUTANEOUSLY ONCE A WEEK 1.5 mL 2    pen needle, diabetic (BD ULTRA-FINE ROBERT PEN NEEDLE) 32 gauge x 5/32" Ndle 1 pen by Misc.(Non-Drug; Combo Route) route 4 (four) times daily. To use 4 times per day with insulin injections. 100 each 1    sucroferric oxyhydroxide (VELPHORO) 500 mg Chew Take 3 tablets by mouth.       No current facility-administered medications for this visit.       Review of Systems   Constitutional:  Negative for chills, fatigue, fever and unexpected weight change.   HENT:  Negative for hearing loss and trouble swallowing.    Eyes:  Negative for photophobia and visual disturbance.   Respiratory:  Negative for cough, shortness of breath and wheezing.    Cardiovascular:  Negative for chest pain, palpitations and leg swelling.   Gastrointestinal:  Negative for abdominal pain and nausea.   Genitourinary:  Negative for dysuria and frequency.   Musculoskeletal:  Positive for gait problem. Negative for arthralgias, back pain, joint swelling and myalgias.   Skin:  Positive for wound. Negative for rash.   Neurological:  Positive for numbness. Negative for tremors, seizures, weakness and headaches.   Hematological:  Does not bruise/bleed easily.         Objective:        Physical Exam:   Foot Exam  Physical Exam  Ortho/SPM Exam     Imaging:            Assessment:       1. Ulcer of left foot with necrosis of muscle    2. Type 2 diabetes mellitus with chronic kidney disease on chronic dialysis, with long-term current use of insulin    3. Diabetic foot infection    4. Hospital discharge follow-up    5. Peripheral vascular disease    6. At high risk for inadequate nutritional intake    7. At risk for readmission to hospital    8. Difficulty in walking, not elsewhere classified    9. Type 2 diabetes mellitus with " hypoglycemia unawareness    10. Bilateral lower extremity edema    11. History of TIA (transient ischemic attack)    12. History of amputation of left foot    13. Type 2 diabetes mellitus with hypoglycemia and coma, with long-term current use of insulin    14. ESRD (end stage renal disease)    15. Type 2 diabetes mellitus with diabetic neuropathy, unspecified whether long term insulin use    16. Tinea pedis of both feet    17. Decreased pedal pulses    18. Hypertension associated with diabetes    19. Ulcer of left foot with fat layer exposed    20. Diabetic ulcer of left midfoot associated with type 2 diabetes mellitus, with necrosis of muscle    21. Skin ulcer of toe of left foot with fat layer exposed    22. History of foot ulcer    23. Onychogryphosis    24. History of ulcer of lower extremity      Plan:   Ulcer of left foot with necrosis of muscle    Type 2 diabetes mellitus with chronic kidney disease on chronic dialysis, with long-term current use of insulin    Diabetic foot infection    Hospital discharge follow-up    Peripheral vascular disease    At high risk for inadequate nutritional intake    At risk for readmission to hospital    Difficulty in walking, not elsewhere classified    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    Bilateral lower extremity edema    History of TIA (transient ischemic attack)    History of amputation of left foot    Type 2 diabetes mellitus with hypoglycemia and coma, with long-term current use of insulin    ESRD (end stage renal disease)    Type 2 diabetes mellitus with diabetic neuropathy, unspecified whether long term insulin use    Tinea pedis of both feet    Decreased pedal pulses    Hypertension associated with diabetes    Ulcer of left foot with fat layer exposed    Diabetic ulcer of left midfoot associated with type 2 diabetes mellitus, with necrosis of muscle    Skin ulcer of toe of left foot with fat layer exposed    History of foot  ulcer    Onychogryphosis    History of ulcer of lower extremity      Follow up in about 1 week (around 1/9/2024).    Procedures          Counseling:     I provided patient education verbally regarding:   Patient diagnosis, treatment options, as well as alternatives, risks, and benefits.     This note was created using Dragon voice recognition software that occasionally misinterpreted phrases or words.

## 2024-01-02 NOTE — PATIENT INSTRUCTIONS
Much of the documentation for this visit was completed in wound docs system. Please see the attached documentation for further details about the patients care. Scanned under the media tab.

## 2024-01-05 ENCOUNTER — PATIENT MESSAGE (OUTPATIENT)
Dept: ADMINISTRATIVE | Facility: HOSPITAL | Age: 59
End: 2024-01-05
Payer: MEDICARE

## 2024-01-09 ENCOUNTER — OFFICE VISIT (OUTPATIENT)
Dept: WOUND CARE | Facility: HOSPITAL | Age: 59
End: 2024-01-09
Attending: PODIATRIST
Payer: MEDICARE

## 2024-01-09 VITALS
TEMPERATURE: 98 F | SYSTOLIC BLOOD PRESSURE: 186 MMHG | HEART RATE: 76 BPM | RESPIRATION RATE: 18 BRPM | DIASTOLIC BLOOD PRESSURE: 90 MMHG

## 2024-01-09 DIAGNOSIS — Z09 HOSPITAL DISCHARGE FOLLOW-UP: ICD-10-CM

## 2024-01-09 DIAGNOSIS — Z87.2 HISTORY OF FOOT ULCER: ICD-10-CM

## 2024-01-09 DIAGNOSIS — Z86.73 HISTORY OF TIA (TRANSIENT ISCHEMIC ATTACK): ICD-10-CM

## 2024-01-09 DIAGNOSIS — E11.641 TYPE 2 DIABETES MELLITUS WITH HYPOGLYCEMIA AND COMA, WITH LONG-TERM CURRENT USE OF INSULIN: ICD-10-CM

## 2024-01-09 DIAGNOSIS — E11.649 TYPE 2 DIABETES MELLITUS WITH HYPOGLYCEMIA UNAWARENESS: ICD-10-CM

## 2024-01-09 DIAGNOSIS — R26.2 DIFFICULTY IN WALKING, NOT ELSEWHERE CLASSIFIED: ICD-10-CM

## 2024-01-09 DIAGNOSIS — Z89.432 HISTORY OF AMPUTATION OF LEFT FOOT: ICD-10-CM

## 2024-01-09 DIAGNOSIS — E11.22 TYPE 2 DIABETES MELLITUS WITH CHRONIC KIDNEY DISEASE ON CHRONIC DIALYSIS, WITH LONG-TERM CURRENT USE OF INSULIN: ICD-10-CM

## 2024-01-09 DIAGNOSIS — I73.9 PERIPHERAL VASCULAR DISEASE: ICD-10-CM

## 2024-01-09 DIAGNOSIS — Z79.4 TYPE 2 DIABETES MELLITUS WITH CHRONIC KIDNEY DISEASE ON CHRONIC DIALYSIS, WITH LONG-TERM CURRENT USE OF INSULIN: ICD-10-CM

## 2024-01-09 DIAGNOSIS — Z91.89 AT HIGH RISK FOR INADEQUATE NUTRITIONAL INTAKE: ICD-10-CM

## 2024-01-09 DIAGNOSIS — Z99.2 TYPE 2 DIABETES MELLITUS WITH CHRONIC KIDNEY DISEASE ON CHRONIC DIALYSIS, WITH LONG-TERM CURRENT USE OF INSULIN: ICD-10-CM

## 2024-01-09 DIAGNOSIS — Z79.4 TYPE 2 DIABETES MELLITUS WITH HYPOGLYCEMIA AND COMA, WITH LONG-TERM CURRENT USE OF INSULIN: ICD-10-CM

## 2024-01-09 DIAGNOSIS — E11.628 DIABETIC FOOT INFECTION: ICD-10-CM

## 2024-01-09 DIAGNOSIS — R60.0 BILATERAL LOWER EXTREMITY EDEMA: ICD-10-CM

## 2024-01-09 DIAGNOSIS — Z87.2 HISTORY OF ULCER OF LOWER EXTREMITY: ICD-10-CM

## 2024-01-09 DIAGNOSIS — R09.89 DECREASED PEDAL PULSES: ICD-10-CM

## 2024-01-09 DIAGNOSIS — N18.6 ESRD (END STAGE RENAL DISEASE): ICD-10-CM

## 2024-01-09 DIAGNOSIS — N18.6 TYPE 2 DIABETES MELLITUS WITH CHRONIC KIDNEY DISEASE ON CHRONIC DIALYSIS, WITH LONG-TERM CURRENT USE OF INSULIN: ICD-10-CM

## 2024-01-09 DIAGNOSIS — Z91.89 AT RISK FOR READMISSION TO HOSPITAL: ICD-10-CM

## 2024-01-09 DIAGNOSIS — B35.3 TINEA PEDIS OF BOTH FEET: ICD-10-CM

## 2024-01-09 DIAGNOSIS — E11.40 TYPE 2 DIABETES MELLITUS WITH DIABETIC NEUROPATHY, UNSPECIFIED WHETHER LONG TERM INSULIN USE: ICD-10-CM

## 2024-01-09 DIAGNOSIS — L97.523 ULCER OF LEFT FOOT WITH NECROSIS OF MUSCLE: Primary | ICD-10-CM

## 2024-01-09 DIAGNOSIS — L60.2 ONYCHOGRYPHOSIS: ICD-10-CM

## 2024-01-09 DIAGNOSIS — L08.9 DIABETIC FOOT INFECTION: ICD-10-CM

## 2024-01-09 PROCEDURE — 99214 OFFICE O/P EST MOD 30 MIN: CPT | Mod: 25,HCNC,, | Performed by: PODIATRIST

## 2024-01-09 PROCEDURE — 11043 DBRDMT MUSC&/FSCA 1ST 20/<: CPT | Mod: HCNC,,, | Performed by: PODIATRIST

## 2024-01-09 PROCEDURE — 3080F DIAST BP >= 90 MM HG: CPT | Mod: HCNC,CPTII,, | Performed by: PODIATRIST

## 2024-01-09 PROCEDURE — 3077F SYST BP >= 140 MM HG: CPT | Mod: HCNC,CPTII,, | Performed by: PODIATRIST

## 2024-01-09 PROCEDURE — 1159F MED LIST DOCD IN RCRD: CPT | Mod: HCNC,CPTII,, | Performed by: PODIATRIST

## 2024-01-09 PROCEDURE — 1160F RVW MEDS BY RX/DR IN RCRD: CPT | Mod: HCNC,CPTII,, | Performed by: PODIATRIST

## 2024-01-09 PROCEDURE — 11043 DBRDMT MUSC&/FSCA 1ST 20/<: CPT | Mod: HCNC,PN | Performed by: PODIATRIST

## 2024-01-09 RX ORDER — DOXYCYCLINE 100 MG/1
100 CAPSULE ORAL 2 TIMES DAILY
Qty: 28 CAPSULE | Refills: 0 | Status: ON HOLD | OUTPATIENT
Start: 2024-01-09 | End: 2024-01-24 | Stop reason: HOSPADM

## 2024-01-10 NOTE — PROGRESS NOTES
[address] [facility] [address]  [place] [address]  Phone: [phone]   [phone]    Patient's PCP:Stefano Lopez MD  Referring Provider: Aaareferral Self    Subjective:      Chief Complaint:: Wound Care, Diabetes, Diabetic Foot Ulcer, Wound Check, Difficulty Walking, Non-healing Wound, Peripheral Neuropathy, Numbness, Pressure Ulcer, and Foot Ulcer    HPI  Leanna García is a 58 y.o. female who presents today for follow up of left plantar medial foot diabetic foot ulcer and left medial 3rd toe diabetic foot ulcer.  See wound docs documentation for full assessment and evaluation of all wounds.      Additionally, patient follows with vascular surgery. Discussed with patient follow up for possible vascular intervention to left lower extremity.       EVALUATION, ASSESSMENT, & PLAN:      1. Debridement of left plantar medial foot diabetic foot ulcer.  Evaluation and Assessment only of left medial 3rd toe diabetic foot ulcer.   See Wound Docs for assessment of wounds and procedure notes  2. Continue taking all medications as prescribed  3. Dressing changes, see Wound docs for dressing change orders  4. RTC one week   5. Counseled patient on increasing protein intake, not getting wound wet, keeping dressing clean dry and intact, following a healthy diet, elevating legs when able, removing pressure from wound     Total time spent for E&M 40  Total time for debridement 35 minutes      Proper ulcer care and the possible need for serial debridements, topical medications, specific dressings and biological engineered skin substitutes if indicated.        Counseling/Education:  I provided patient education verbally regarding:   The aspects of diabetes and how it pertains to the feet. I explained the importance of proper diabetic foot care and how it is essential for the health of their feet.     I discussed the importance of knowing their Hemoglobin A1c and that the level needs to be as close to 6 as possible.  I discussed the increase complications of high blood sugar including stroke, blindness, heart attack, kidney failure and loss of limb secondary to neuropathy and PVD.      With neuropathy, beware of any breaks in the skin or redness. These areas are not recognized early due to the numbness.     I discussed Diabetes, lower back issues, metabolic disorders, systemic causes, chemotherapy, vitamin deficiency, heavy metal exposure, as some of the causes. I also explained that as much as 40% of the time we can not find a cause. I discussed different treatments available to control the symptoms but which may not cure the problem.         Counseled patient on the aspects of diabetes and how it pertains to the feet.  I explained the importance of proper diabetic foot care and how it is essential for the health of their feet.        Shoe inspection. Patient instructed on proper foot hygeine. We discussed wearing proper shoe gear, daily foot inspections, never walking without protective shoe gear, never putting sharp instruments to feet, routine podiatric nail visits every 2-3 months.          Patient should call the office immediately if any signs of infection, such as fever, chills, sweats, increased redness or pain.     Patient was instructed to call the clinic or go to the emergency department if their symptoms do not improve, worsens, or if new symptoms develop.  Patient was advised that if any increased swelling, pain, or numbness arise to go immediately to the ED. Patient knows to call any time if an emergency arises. Shared decision making occurred and patient verbalized understanding in agreement with this plan.         >50% of this > 60 minute visit was spent face to face educating/counseling the patient     I spent a total of 60 minutes on the day of the visit.This includes face to face time and non-face to face time preparing to see the patient (eg, review of tests), obtaining and/or reviewing separately obtained  history, documenting clinical information in the electronic or other health record, independently interpreting results and communicating results to the patient/family/caregiver, or care coordinator.        Much of the documentation for this visit was completed in the Wound Docs system.  Please see the attached documentation for further details about the patient's care. Scanned under the Media tab.         Alivia Bruno DPM     Vitals:    01/09/24 1054   BP: (!) 186/90   Pulse: 76   Resp: 18   Temp: 98.1 °F (36.7 °C)   PainSc: 0-No pain      Shoe Size:     Past Surgical History:   Procedure Laterality Date    ANGIOGRAPHY OF LOWER EXTREMITY Left 10/18/2022    Procedure: Angiogram Extremity Unilateral;  Surgeon: Ali Khoobehi, MD;  Location: [facility] CATH/EP LAB;  Service: Vascular;  Laterality: Left;    AV FISTULA PLACEMENT Left 8/29/2022    Procedure: CREATION, AV FISTULA;  Surgeon: Ali Khoobehi, MD;  Location: [facility] OR;  Service: Vascular;  Laterality: Left;    BONE BIOPSY Left 8/24/2022    Procedure: Biopsy-Bone;  Surgeon: Porfirio Ponce DPM;  Location: [facility] OR;  Service: Podiatry;  Laterality: Left;    COLONOSCOPY N/A 10/5/2016    Procedure: COLONOSCOPY;  Surgeon: Pillo Chanel MD;  Location: [facility] ENDO;  Service: Endoscopy;  Laterality: N/A;    COLONOSCOPY N/A 4/26/2022    Procedure: COLONOSCOPY;  Surgeon: Saravanan Rachel MD;  Location: [facility] ENDO;  Service: Endoscopy;  Laterality: N/A;    FISTULOGRAM Left 8/24/2022    Procedure: Fistulogram;  Surgeon: Ali Khoobehi, MD;  Location: [facility] CATH/EP LAB;  Service: Vascular;  Laterality: Left;    HERNIA REPAIR Bilateral 11/22/2016    inguinal    HYSTERECTOMY      INCISION AND DRAINAGE FOOT Left 5/13/2022    Procedure: INCISION AND DRAINAGE, FOOT;  Surgeon: Ricardo Bruno DPM;  Location: [facility] OR;  Service: Podiatry;  Laterality: Left;    OOPHORECTOMY      THROMBECTOMY, AV FISTULA, UPPER EXTREMITY, PERCUTANEOUS  8/24/2022    Procedure: THROMBECTOMY, AV FISTULA, UPPER  EXTREMITY, PERCUTANEOUS;  Surgeon: Ali Khoobehi, MD;  Location: [facility] CATH/EP LAB;  Service: Vascular;;    TOE AMPUTATION Left 8/26/2022    Procedure: AMPUTATION, TOE;  Surgeon: Porfirio Ponce DPM;  Location: [facility] OR;  Service: Podiatry;  Laterality: Left;     Past Medical History:   Diagnosis Date    A-fib     resolved per patient    Anemia due to end stage renal disease 6/30/2022    Arthritis     Bronchitis     Cardiovascular event risk, ASCVD 10-year risk 6.7% 10/15/2016    Diabetes mellitus type II     Diabetic foot infection     Diabetic ulcer of left midfoot 05/05/2022    Disorder of kidney and ureter     Encounter for blood transfusion     ESRD (end stage renal disease) 05/18/2018    MANJINDER (generalized anxiety disorder) 10/25/2016    History of colon polyps 11/02/2016    Hyperlipidemia     Hypertension     Stroke      Family History   Problem Relation Age of Onset    Hyperlipidemia Mother     Hypertension Mother     Hypertension Father     Diabetes Father     Diabetes Sister     Diabetes Sister     Diabetes Maternal Aunt     Diabetes Maternal Grandmother     Heart disease Maternal Grandmother     Cancer Paternal Grandmother     Breast cancer Neg Hx     Colon cancer Neg Hx     Ovarian cancer Neg Hx         Social History:   Marital Status:   Alcohol History:  reports no history of alcohol use.  Tobacco History:  reports that she has never smoked. She has never used smokeless tobacco.  Drug History:  reports no history of drug use.    Review of patient's allergies indicates:   Allergen Reactions    Dilaudid [hydromorphone] Nausea And Vomiting    Chlorhexidine Itching    Lortab [hydrocodone-acetaminophen] Itching       Current Outpatient Medications   Medication Sig Dispense Refill    acetaminophen (TYLENOL) 325 MG tablet Take 325 mg by mouth every 6 (six) hours.      ALPRAZolam (XANAX) 0.5 MG tablet Take 1 tablet by mouth.      atorvastatin (LIPITOR) 80 MG tablet Take 1 tablet (80 mg total) by mouth  once daily. 90 tablet 3    azelastine (ASTELIN) 137 mcg (0.1 %) nasal spray 1 spray (137 mcg total) by Nasal route 2 (two) times daily. 30 mL 3    blood sugar diagnostic Strp To check BG 4 times daily, to use with insurance preferred meter 450 strip 3    blood sugar diagnostic Strp To check BG 1 times daily, to use with insurance preferred meter 100 each 11    blood sugar diagnostic Strp To check BG 3 times daily, to use with insurance preferred meter 100 strip 1    blood-glucose meter kit To check BG 3 times daily, to use with insurance preferred meter 1 each 0    cetirizine (ZYRTEC) 10 MG tablet Take 1 tablet (10 mg total) by mouth every other day. 45 tablet 3    clopidogreL (PLAVIX) 75 mg tablet Take 1 tablet (75 mg total) by mouth once daily. 90 tablet 3    doxycycline (MONODOX) 100 MG capsule Take 1 capsule (100 mg total) by mouth 2 (two) times daily. 20 capsule 0    doxycycline (VIBRAMYCIN) 100 MG Cap Take 1 capsule (100 mg total) by mouth 2 (two) times daily. for 14 days 28 capsule 0    epoetin chris-epbx (RETACRIT) 4,000 unit/mL injection Inject 1.11 mLs (4,440 Units total) into the skin every Mon, Wed, Fri.      EScitalopram oxalate (LEXAPRO) 10 MG tablet Take 1 tablet (10 mg total) by mouth once daily. (Patient not taking: Reported on 10/3/2023) 30 tablet 11    fluticasone propionate (FLONASE) 50 mcg/actuation nasal spray 2 sprays (100 mcg total) by Each Nostril route once daily. 16 g 3    gabapentin (NEURONTIN) 100 MG capsule Take 1 capsule (100 mg total) by mouth 2 (two) times daily. 180 capsule 3    hydrALAZINE (APRESOLINE) 25 MG tablet Take 1 tablet (25 mg total) by mouth 2 (two) times daily as needed (Systolic blood pressure > 170 mm Hg).      lancets Misc To check BG 1 times daily, to use with insurance preferred meter 100 each 11    lancets Misc To check BG 3 times daily, to use with insurance preferred meter 100 each 1    minoxidiL (LONITEN) 10 MG Tab Take 10 mg by mouth 2 (two) times daily.       "multivitamin (THERAGRAN) per tablet Take 1 tablet by mouth.      OZEMPIC 0.25 mg or 0.5 mg (2 mg/3 mL) pen injector INJECT 0.25 MG SUBCUTANEOUSLY ONCE A WEEK 1.5 mL 2    pen needle, diabetic (BD ULTRA-FINE ROBERT PEN NEEDLE) 32 gauge x 5/32" Ndle 1 pen by Misc.(Non-Drug; Combo Route) route 4 (four) times daily. To use 4 times per day with insulin injections. 100 each 1    sucroferric oxyhydroxide (VELPHORO) 500 mg Chew Take 3 tablets by mouth.       No current facility-administered medications for this visit.       Review of Systems   Constitutional:  Negative for chills, fatigue, fever and unexpected weight change.   HENT:  Negative for hearing loss and trouble swallowing.    Eyes:  Negative for photophobia and visual disturbance.   Respiratory:  Negative for cough, shortness of breath and wheezing.    Cardiovascular:  Negative for chest pain, palpitations and leg swelling.   Gastrointestinal:  Negative for abdominal pain and nausea.   Genitourinary:  Negative for dysuria and frequency.   Musculoskeletal:  Positive for gait problem. Negative for arthralgias, back pain, joint swelling and myalgias.   Skin:  Positive for wound. Negative for rash.   Neurological:  Positive for numbness. Negative for tremors, seizures, weakness and headaches.   Hematological:  Does not bruise/bleed easily.         Objective:        Physical Exam:   Foot Exam  Physical Exam  Ortho/SPM Exam     Imaging:            Assessment:       1. Ulcer of left foot with necrosis of muscle    2. Diabetic foot infection    3. Type 2 diabetes mellitus with chronic kidney disease on chronic dialysis, with long-term current use of insulin    4. Peripheral vascular disease    5. At high risk for inadequate nutritional intake    6. 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 Difficulty in walking, not elsewhere classified    8. At risk for readmission to hospital    9. Bilateral lower extremity edema    10. Hospital discharge follow-up  "   11. History of TIA (transient ischemic attack)    12. History of amputation of left foot    13. Type 2 diabetes mellitus with hypoglycemia and coma, with long-term current use of insulin    14. ESRD (end stage renal disease)    15. Type 2 diabetes mellitus with diabetic neuropathy, unspecified whether long term insulin use    16. Decreased pedal pulses    17. History of foot ulcer    18. Onychogryphosis    19. History of ulcer of lower extremity    20. Tinea pedis of both feet      Plan:   Ulcer of left foot with necrosis of muscle  -     doxycycline (VIBRAMYCIN) 100 MG Cap; Take 1 capsule (100 mg total) by mouth 2 (two) times daily. 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    Difficulty in walking, not elsewhere classified    At risk for readmission to hospital    Bilateral lower extremity edema    Hospital discharge follow-up    History of TIA (transient ischemic attack)    History of amputation of left foot    Type 2 diabetes mellitus with hypoglycemia and coma, with long-term current use of insulin    ESRD (end stage renal disease)    Type 2 diabetes mellitus with diabetic neuropathy, unspecified whether long term insulin use    Decreased pedal pulses    History of foot ulcer    Onychogryphosis    History of ulcer of lower extremity    Tinea pedis of both feet      Follow up in about 1 week (around 1/16/2024).    Procedures          Counseling:     I provided patient education verbally regarding:   Patient diagnosis, treatment options, as well as alternatives, risks, and benefits.     This note was created using Dragon voice recognition software that occasionally misinterpreted phrases or words.

## 2024-01-16 ENCOUNTER — OFFICE VISIT (OUTPATIENT)
Dept: WOUND CARE | Facility: HOSPITAL | Age: 59
End: 2024-01-16
Attending: PODIATRIST
Payer: MEDICARE

## 2024-01-16 VITALS
TEMPERATURE: 99 F | RESPIRATION RATE: 16 BRPM | HEART RATE: 88 BPM | SYSTOLIC BLOOD PRESSURE: 190 MMHG | DIASTOLIC BLOOD PRESSURE: 108 MMHG

## 2024-01-16 DIAGNOSIS — E11.22 TYPE 2 DIABETES MELLITUS WITH CHRONIC KIDNEY DISEASE ON CHRONIC DIALYSIS, WITH LONG-TERM CURRENT USE OF INSULIN: ICD-10-CM

## 2024-01-16 DIAGNOSIS — I15.2 HYPERTENSION ASSOCIATED WITH DIABETES: ICD-10-CM

## 2024-01-16 DIAGNOSIS — R26.2 DIFFICULTY IN WALKING, NOT ELSEWHERE CLASSIFIED: ICD-10-CM

## 2024-01-16 DIAGNOSIS — L60.2 ONYCHOGRYPHOSIS: ICD-10-CM

## 2024-01-16 DIAGNOSIS — E11.641 TYPE 2 DIABETES MELLITUS WITH HYPOGLYCEMIA AND COMA, WITH LONG-TERM CURRENT USE OF INSULIN: ICD-10-CM

## 2024-01-16 DIAGNOSIS — N18.6 ESRD (END STAGE RENAL DISEASE): ICD-10-CM

## 2024-01-16 DIAGNOSIS — Z87.2 HISTORY OF ULCER OF LOWER EXTREMITY: ICD-10-CM

## 2024-01-16 DIAGNOSIS — E11.649 TYPE 2 DIABETES MELLITUS WITH HYPOGLYCEMIA UNAWARENESS: ICD-10-CM

## 2024-01-16 DIAGNOSIS — L97.523 ULCER OF LEFT FOOT WITH NECROSIS OF MUSCLE: Primary | ICD-10-CM

## 2024-01-16 DIAGNOSIS — Z79.4 TYPE 2 DIABETES MELLITUS WITH HYPOGLYCEMIA AND COMA, WITH LONG-TERM CURRENT USE OF INSULIN: ICD-10-CM

## 2024-01-16 DIAGNOSIS — L08.9 DIABETIC FOOT INFECTION: ICD-10-CM

## 2024-01-16 DIAGNOSIS — L08.9 WOUND INFECTION: ICD-10-CM

## 2024-01-16 DIAGNOSIS — Z91.89 AT HIGH RISK FOR INADEQUATE NUTRITIONAL INTAKE: ICD-10-CM

## 2024-01-16 DIAGNOSIS — E11.628 DIABETIC FOOT INFECTION: ICD-10-CM

## 2024-01-16 DIAGNOSIS — Z87.2 HISTORY OF FOOT ULCER: ICD-10-CM

## 2024-01-16 DIAGNOSIS — N18.6 TYPE 2 DIABETES MELLITUS WITH CHRONIC KIDNEY DISEASE ON CHRONIC DIALYSIS, WITH LONG-TERM CURRENT USE OF INSULIN: ICD-10-CM

## 2024-01-16 DIAGNOSIS — L97.522 ULCER OF LEFT FOOT WITH FAT LAYER EXPOSED: ICD-10-CM

## 2024-01-16 DIAGNOSIS — R60.0 BILATERAL LOWER EXTREMITY EDEMA: ICD-10-CM

## 2024-01-16 DIAGNOSIS — R09.89 DECREASED PEDAL PULSES: ICD-10-CM

## 2024-01-16 DIAGNOSIS — Z91.89 AT RISK FOR READMISSION TO HOSPITAL: ICD-10-CM

## 2024-01-16 DIAGNOSIS — Z79.4 TYPE 2 DIABETES MELLITUS WITH CHRONIC KIDNEY DISEASE ON CHRONIC DIALYSIS, WITH LONG-TERM CURRENT USE OF INSULIN: ICD-10-CM

## 2024-01-16 DIAGNOSIS — Z99.2 TYPE 2 DIABETES MELLITUS WITH CHRONIC KIDNEY DISEASE ON CHRONIC DIALYSIS, WITH LONG-TERM CURRENT USE OF INSULIN: ICD-10-CM

## 2024-01-16 DIAGNOSIS — I73.9 PERIPHERAL VASCULAR DISEASE: ICD-10-CM

## 2024-01-16 DIAGNOSIS — B35.3 TINEA PEDIS OF BOTH FEET: ICD-10-CM

## 2024-01-16 DIAGNOSIS — Z86.73 HISTORY OF TIA (TRANSIENT ISCHEMIC ATTACK): ICD-10-CM

## 2024-01-16 DIAGNOSIS — Z89.432 HISTORY OF AMPUTATION OF LEFT FOOT: ICD-10-CM

## 2024-01-16 DIAGNOSIS — T14.8XXA WOUND INFECTION: ICD-10-CM

## 2024-01-16 DIAGNOSIS — E11.40 TYPE 2 DIABETES MELLITUS WITH DIABETIC NEUROPATHY, UNSPECIFIED WHETHER LONG TERM INSULIN USE: ICD-10-CM

## 2024-01-16 DIAGNOSIS — E11.59 HYPERTENSION ASSOCIATED WITH DIABETES: ICD-10-CM

## 2024-01-16 PROCEDURE — 99214 OFFICE O/P EST MOD 30 MIN: CPT | Mod: HCNC,PN | Performed by: PODIATRIST

## 2024-01-16 PROCEDURE — 11042 DBRDMT SUBQ TIS 1ST 20SQCM/<: CPT | Mod: HCNC,PN | Performed by: PODIATRIST

## 2024-01-16 PROCEDURE — 1160F RVW MEDS BY RX/DR IN RCRD: CPT | Mod: HCNC,CPTII,, | Performed by: PODIATRIST

## 2024-01-16 PROCEDURE — 87075 CULTR BACTERIA EXCEPT BLOOD: CPT | Mod: HCNC | Performed by: PODIATRIST

## 2024-01-16 PROCEDURE — 3080F DIAST BP >= 90 MM HG: CPT | Mod: HCNC,CPTII,, | Performed by: PODIATRIST

## 2024-01-16 PROCEDURE — 3077F SYST BP >= 140 MM HG: CPT | Mod: HCNC,CPTII,, | Performed by: PODIATRIST

## 2024-01-16 PROCEDURE — 1159F MED LIST DOCD IN RCRD: CPT | Mod: HCNC,CPTII,, | Performed by: PODIATRIST

## 2024-01-16 PROCEDURE — 11042 DBRDMT SUBQ TIS 1ST 20SQCM/<: CPT | Mod: HCNC,,, | Performed by: PODIATRIST

## 2024-01-16 PROCEDURE — 99214 OFFICE O/P EST MOD 30 MIN: CPT | Mod: 25,HCNC,, | Performed by: PODIATRIST

## 2024-01-16 PROCEDURE — 87070 CULTURE OTHR SPECIMN AEROBIC: CPT | Mod: HCNC | Performed by: PODIATRIST

## 2024-01-17 RX ORDER — DOXYCYCLINE 100 MG/1
100 CAPSULE ORAL 2 TIMES DAILY
Qty: 28 CAPSULE | Refills: 0 | Status: ON HOLD | OUTPATIENT
Start: 2024-01-17 | End: 2024-01-24 | Stop reason: HOSPADM

## 2024-01-17 NOTE — PROGRESS NOTES
[address] [facility] [address]  [place] [address]  Phone: [phone]   [phone]    Patient's PCP:Stefano Lopez MD  Referring Provider: Aaareferral Self    Subjective:      Chief Complaint:: Wound Care, Wound Consult, Diabetes, Non-healing Wound, Plantar Fasciitis, Wound Check, Wound Infection, Peripheral Neuropathy, Numbness, Diabetic Foot Ulcer, Difficulty Walking, and Foot Ulcer    HPI  Leanna García is a 58 y.o. female who presents today for follow up of left plantar medial foot diabetic foot ulcer and left medial 3rd toe diabetic foot ulcer.  See wound docs documentation for full assessment and evaluation of all wounds.      Additionally, patient follows with vascular surgery. Discussed with patient follow up for possible vascular intervention to left lower extremity.       EVALUATION, ASSESSMENT, & PLAN:      1.   See Wound Docs for assessment of wounds and procedure notes  2. Continue taking all medications as prescribed  3. Dressing changes, see Wound docs for dressing change orders  4. RTC one week   5. Counseled patient on increasing protein intake, not getting wound wet, keeping dressing clean dry and intact, following a healthy diet, elevating legs when able, removing pressure from wound     Total time spent for E&M 40  Total time for debridement 35 minutes      Proper ulcer care and the possible need for serial debridements, topical medications, specific dressings and biological engineered skin substitutes if indicated.        Counseling/Education:  I provided patient education verbally regarding:   The aspects of diabetes and how it pertains to the feet. I explained the importance of proper diabetic foot care and how it is essential for the health of their feet.     I discussed the importance of knowing their Hemoglobin A1c and that the level needs to be as close to 6 as possible. I discussed the increase complications of high blood sugar including stroke, blindness, heart attack,  kidney failure and loss of limb secondary to neuropathy and PVD.      With neuropathy, beware of any breaks in the skin or redness. These areas are not recognized early due to the numbness.     I discussed Diabetes, lower back issues, metabolic disorders, systemic causes, chemotherapy, vitamin deficiency, heavy metal exposure, as some of the causes. I also explained that as much as 40% of the time we can not find a cause. I discussed different treatments available to control the symptoms but which may not cure the problem.         Counseled patient on the aspects of diabetes and how it pertains to the feet.  I explained the importance of proper diabetic foot care and how it is essential for the health of their feet.        Shoe inspection. Patient instructed on proper foot hygeine. We discussed wearing proper shoe gear, daily foot inspections, never walking without protective shoe gear, never putting sharp instruments to feet, routine podiatric nail visits every 2-3 months.          Patient should call the office immediately if any signs of infection, such as fever, chills, sweats, increased redness or pain.     Patient was instructed to call the clinic or go to the emergency department if their symptoms do not improve, worsens, or if new symptoms develop.  Patient was advised that if any increased swelling, pain, or numbness arise to go immediately to the ED. Patient knows to call any time if an emergency arises. Shared decision making occurred and patient verbalized understanding in agreement with this plan.         >50% of this > 60 minute visit was spent face to face educating/counseling the patient     I spent a total of 60 minutes on the day of the visit.This includes face to face time and non-face to face time preparing to see the patient (eg, review of tests), obtaining and/or reviewing separately obtained history, documenting clinical information in the electronic or other health record, independently  interpreting results and communicating results to the patient/family/caregiver, or care coordinator.        Much of the documentation for this visit was completed in the Wound Docs system.  Please see the attached documentation for further details about the patient's care. Scanned under the Media tab.         Alivia Bruno DPM   Vitals:    01/16/24 1449   BP: (!) 190/108   Pulse: 88   Resp: 16   Temp: 98.5 °F (36.9 °C)   PainSc: 0-No pain      Shoe Size:     Past Surgical History:   Procedure Laterality Date    ANGIOGRAPHY OF LOWER EXTREMITY Left 10/18/2022    Procedure: Angiogram Extremity Unilateral;  Surgeon: Ali Khoobehi, MD;  Location: [facility];  Service: Vascular;  Laterality: Left;    AV FISTULA PLACEMENT Left 8/29/2022    Procedure: CREATION, AV FISTULA;  Surgeon: Ali Khoobehi, MD;  Location: [facility];  Service: Vascular;  Laterality: Left;    BONE BIOPSY Left 8/24/2022    Procedure: Biopsy-Bone;  Surgeon: Pofririo Ponce DPM;  Location: [facility];  Service: Podiatry;  Laterality: Left;    COLONOSCOPY N/A 10/5/2016    Procedure: COLONOSCOPY;  Surgeon: Pillo Chanel MD;  Location: [facility] END[facility];  Service: Endoscopy;  Laterality: N/A;    COLONOSCOPY N/A 4/26/2022    Procedure: COLONOSCOPY;  Surgeon: Saravanan Rachel MD;  Location: [facility] END[facility];  Service: Endoscopy;  Laterality: N/A;    FISTULOGRAM Left 8/24/2022    Procedure: Fistulogram;  Surgeon: Ali Khoobehi, MD;  Location: [facility];  Service: Vascular;  Laterality: Left;    HERNIA REPAIR Bilateral 11/22/2016    inguinal    HYSTERECTOMY      INCISION AND DRAINAGE FOOT Left 5/13/2022    Procedure: INCISION AND DRAINAGE, FOOT;  Surgeon: Ricardo Bruno DPM;  Location: [facility];  Service: Podiatry;  Laterality: Left;    OOPHORECTOMY      THROMBECTOMY, AV FISTULA, UPPER EXTREMITY, PERCUTANEOUS  8/24/2022    Procedure: THROMBECTOMY, AV FISTULA, UPPER EXTREMITY, PERCUTANEOUS;  Surgeon: Ali Khoobehi, MD;  Location: [facility];  Service:  Vascular;;    TOE AMPUTATION Left 8/26/2022    Procedure: AMPUTATION, TOE;  Surgeon: Porfirio Ponce DPM;  Location: [facility];  Service: Podiatry;  Laterality: Left;     Past Medical History:   Diagnosis Date    A-fib     resolved per patient    Anemia due to end stage renal disease 6/30/2022    Arthritis     Bronchitis     Cardiovascular event risk, ASCVD 10-year risk 6.7% 10/15/2016    Diabetes mellitus type II     Diabetic foot infection     Diabetic ulcer of left midfoot 05/05/2022    Disorder of kidney and ureter     Encounter for blood transfusion     ESRD (end stage renal disease) 05/18/2018    MANJINDER (generalized anxiety disorder) 10/25/2016    History of colon polyps 11/02/2016    Hyperlipidemia     Hypertension     Stroke      Family History   Problem Relation Age of Onset    Hyperlipidemia Mother     Hypertension Mother     Hypertension Father     Diabetes Father     Diabetes Sister     Diabetes Sister     Diabetes Maternal Aunt     Diabetes Maternal Grandmother     Heart disease Maternal Grandmother     Cancer Paternal Grandmother     Breast cancer Neg Hx     Colon cancer Neg Hx     Ovarian cancer Neg Hx         Social History:   Marital Status:   Alcohol History:  reports no history of alcohol use.  Tobacco History:  reports that she has never smoked. She has never used smokeless tobacco.  Drug History:  reports no history of drug use.    Review of patient's allergies indicates:   Allergen Reactions    Dilaudid [hydromorphone] Nausea And Vomiting    Chlorhexidine Itching    Lortab [hydrocodone-acetaminophen] Itching       Current Outpatient Medications   Medication Sig Dispense Refill    acetaminophen (TYLENOL) 325 MG tablet Take 325 mg by mouth every 6 (six) hours.      ALPRAZolam (XANAX) 0.5 MG tablet Take 1 tablet by mouth.      atorvastatin (LIPITOR) 80 MG tablet Take 1 tablet (80 mg total) by mouth once daily. 90 tablet 3    azelastine (ASTELIN) 137 mcg (0.1 %) nasal spray 1 spray (137 mcg  total) by Nasal route 2 (two) times daily. 30 mL 3    blood sugar diagnostic Strp To check BG 4 times daily, to use with insurance preferred meter 450 strip 3    blood sugar diagnostic Strp To check BG 1 times daily, to use with insurance preferred meter 100 each 11    blood sugar diagnostic Strp To check BG 3 times daily, to use with insurance preferred meter 100 strip 1    blood-glucose meter kit To check BG 3 times daily, to use with insurance preferred meter 1 each 0    cetirizine (ZYRTEC) 10 MG tablet Take 1 tablet (10 mg total) by mouth every other day. 45 tablet 3    clopidogreL (PLAVIX) 75 mg tablet Take 1 tablet (75 mg total) by mouth once daily. 90 tablet 3    doxycycline (MONODOX) 100 MG capsule Take 1 capsule (100 mg total) by mouth 2 (two) times daily. 20 capsule 0    doxycycline (VIBRAMYCIN) 100 MG Cap Take 1 capsule (100 mg total) by mouth 2 (two) times daily. for 14 days 28 capsule 0    doxycycline (VIBRAMYCIN) 100 MG Cap Take 1 capsule (100 mg total) by mouth 2 (two) times daily. for 14 days 28 capsule 0    epoetin chris-epbx (RETACRIT) 4,000 unit/mL injection Inject 1.11 mLs (4,440 Units total) into the skin every Mon, Wed, Fri.      EScitalopram oxalate (LEXAPRO) 10 MG tablet Take 1 tablet (10 mg total) by mouth once daily. (Patient not taking: Reported on 10/3/2023) 30 tablet 11    fluticasone propionate (FLONASE) 50 mcg/actuation nasal spray 2 sprays (100 mcg total) by Each Nostril route once daily. 16 g 3    gabapentin (NEURONTIN) 100 MG capsule Take 1 capsule (100 mg total) by mouth 2 (two) times daily. 180 capsule 3    hydrALAZINE (APRESOLINE) 25 MG tablet Take 1 tablet (25 mg total) by mouth 2 (two) times daily as needed (Systolic blood pressure > 170 mm Hg).      lancets Misc To check BG 1 times daily, to use with insurance preferred meter 100 each 11    lancets Misc To check BG 3 times daily, to use with insurance preferred meter 100 each 1    minoxidiL (LONITEN) 10 MG Tab Take 10 mg by  "mouth 2 (two) times daily.      multivitamin (THERAGRAN) per tablet Take 1 tablet by mouth.      OZEMPIC 0.25 mg or 0.5 mg (2 mg/3 mL) pen injector INJECT 0.25 MG SUBCUTANEOUSLY ONCE A WEEK 1.5 mL 2    pen needle, diabetic (BD ULTRA-FINE ROBERT PEN NEEDLE) 32 gauge x 5/32" Ndle 1 pen by Misc.(Non-Drug; Combo Route) route 4 (four) times daily. To use 4 times per day with insulin injections. 100 each 1    sucroferric oxyhydroxide (VELPHORO) 500 mg Chew Take 3 tablets by mouth.       No current facility-administered medications for this visit.       Review of Systems   Constitutional:  Negative for chills, fatigue, fever and unexpected weight change.   HENT:  Negative for hearing loss and trouble swallowing.    Eyes:  Negative for photophobia and visual disturbance.   Respiratory:  Negative for cough, shortness of breath and wheezing.    Cardiovascular:  Negative for chest pain, palpitations and leg swelling.   Gastrointestinal:  Negative for abdominal pain and nausea.   Genitourinary:  Negative for dysuria and frequency.   Musculoskeletal:  Positive for gait problem. Negative for arthralgias, back pain, joint swelling and myalgias.   Skin:  Positive for wound. Negative for rash.   Neurological:  Positive for numbness. Negative for tremors, seizures, weakness and headaches.   Hematological:  Does not bruise/bleed easily.         Objective:        Physical Exam:   Foot Exam  Physical Exam  Ortho/SPM Exam     Imaging:            Assessment:       1. Ulcer of left foot with necrosis of muscle    2. Type 2 diabetes mellitus with chronic kidney disease on chronic dialysis, with long-term current use of insulin    3. Peripheral vascular disease    4. History of ulcer of lower extremity    5. Ulcer of left foot with fat layer exposed    6. Wound infection    7. Diabetic foot infection    8. 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 At high risk for inadequate nutritional intake    10. At risk for readmission " to hospital    11. Difficulty in walking, not elsewhere classified    12. Bilateral lower extremity edema    13. Type 2 diabetes mellitus with diabetic neuropathy, unspecified whether long term insulin use    14. Type 2 diabetes mellitus with hypoglycemia and coma, with long-term current use of insulin    15. History of amputation of left foot    16. History of TIA (transient ischemic attack)    17. ESRD (end stage renal disease)    18. Decreased pedal pulses    19. Onychogryphosis    20. Tinea pedis of both feet    21. Hypertension associated with diabetes    22. History of foot ulcer      Plan:   Ulcer of left foot with necrosis of muscle  -     Culture, Anaerobic  -     CULTURE, AEROBIC  (SPECIFY SOURCE)    Type 2 diabetes mellitus with chronic kidney disease on chronic dialysis, with long-term current use of insulin    Peripheral vascular disease    History of ulcer of lower extremity    Ulcer of left foot with fat layer exposed    Wound infection  -     Culture, Anaerobic  -     CULTURE, AEROBIC  (SPECIFY SOURCE)    Diabetic foot infection    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    At high risk for inadequate nutritional intake    At risk for readmission to hospital    Difficulty in walking, not elsewhere classified    Bilateral lower extremity edema    Type 2 diabetes mellitus with diabetic neuropathy, unspecified whether long term insulin use    Type 2 diabetes mellitus with hypoglycemia and coma, with long-term current use of insulin    History of amputation of left foot    History of TIA (transient ischemic attack)    ESRD (end stage renal disease)    Decreased pedal pulses    Onychogryphosis    Tinea pedis of both feet    Hypertension associated with diabetes    History of foot ulcer    Other orders  -     doxycycline (VIBRAMYCIN) 100 MG Cap; Take 1 capsule (100 mg total) by mouth 2 (two) times daily. for 14 days  Dispense: 28 capsule; Refill: 0      Follow up in about 1 week (around  1/23/2024).    Procedures          Counseling:     I provided patient education verbally regarding:   Patient diagnosis, treatment options, as well as alternatives, risks, and benefits.     This note was created using Dragon voice recognition software that occasionally misinterpreted phrases or words.

## 2024-01-18 LAB — BACTERIA SPEC AEROBE CULT: NORMAL

## 2024-01-22 ENCOUNTER — PATIENT MESSAGE (OUTPATIENT)
Dept: ADMINISTRATIVE | Facility: HOSPITAL | Age: 59
End: 2024-01-22
Payer: MEDICARE

## 2024-01-22 ENCOUNTER — HOSPITAL ENCOUNTER (OUTPATIENT)
Facility: HOSPITAL | Age: 59
Discharge: HOME | End: 2024-01-24
Attending: EMERGENCY MEDICINE | Admitting: INTERNAL MEDICINE
Payer: MEDICARE

## 2024-01-22 DIAGNOSIS — L97.523 ULCER OF LEFT FOOT WITH NECROSIS OF MUSCLE: ICD-10-CM

## 2024-01-22 DIAGNOSIS — R07.9 CHEST PAIN: ICD-10-CM

## 2024-01-22 DIAGNOSIS — I63.9 STROKE: ICD-10-CM

## 2024-01-22 DIAGNOSIS — T82.9XXA COMPLICATION OF VASCULAR DIALYSIS CATHETER: Primary | ICD-10-CM

## 2024-01-22 DIAGNOSIS — T82.9XXA COMPLICATION ASSOCIATED WITH DIALYSIS CATHETER: ICD-10-CM

## 2024-01-22 DIAGNOSIS — R07.9 CHEST PAIN, UNSPECIFIED TYPE: ICD-10-CM

## 2024-01-22 LAB
ALBUMIN SERPL BCP-MCNC: 4.1 G/DL (ref 3.5–5.2)
ALP SERPL-CCNC: 88 U/L (ref 55–135)
ALT SERPL W/O P-5'-P-CCNC: 12 U/L (ref 10–44)
ANION GAP SERPL CALC-SCNC: 13 MMOL/L (ref 8–16)
AST SERPL-CCNC: 14 U/L (ref 10–40)
BACTERIA SPEC ANAEROBE CULT: NORMAL
BASOPHILS # BLD AUTO: 0.07 K/UL (ref 0–0.2)
BASOPHILS NFR BLD: 0.6 % (ref 0–1.9)
BILIRUB SERPL-MCNC: 0.4 MG/DL (ref 0.1–1)
BUN SERPL-MCNC: 74 MG/DL (ref 6–20)
CALCIUM SERPL-MCNC: 9.8 MG/DL (ref 8.7–10.5)
CHLORIDE SERPL-SCNC: 100 MMOL/L (ref 95–110)
CO2 SERPL-SCNC: 25 MMOL/L (ref 23–29)
CREAT SERPL-MCNC: 7.1 MG/DL (ref 0.5–1.4)
DIFFERENTIAL METHOD BLD: ABNORMAL
EOSINOPHIL # BLD AUTO: 0.2 K/UL (ref 0–0.5)
EOSINOPHIL NFR BLD: 1.4 % (ref 0–8)
ERYTHROCYTE [DISTWIDTH] IN BLOOD BY AUTOMATED COUNT: 15 % (ref 11.5–14.5)
EST. GFR  (NO RACE VARIABLE): 6.2 ML/MIN/1.73 M^2
GLUCOSE SERPL-MCNC: 116 MG/DL (ref 70–110)
HCT VFR BLD AUTO: 39.9 % (ref 37–48.5)
HGB BLD-MCNC: 12.8 G/DL (ref 12–16)
IMM GRANULOCYTES # BLD AUTO: 0.07 K/UL (ref 0–0.04)
IMM GRANULOCYTES NFR BLD AUTO: 0.6 % (ref 0–0.5)
LYMPHOCYTES # BLD AUTO: 1.4 K/UL (ref 1–4.8)
LYMPHOCYTES NFR BLD: 12.6 % (ref 18–48)
MAGNESIUM SERPL-MCNC: 2.2 MG/DL (ref 1.6–2.6)
MCH RBC QN AUTO: 28.3 PG (ref 27–31)
MCHC RBC AUTO-ENTMCNC: 32.1 G/DL (ref 32–36)
MCV RBC AUTO: 88 FL (ref 82–98)
MONOCYTES # BLD AUTO: 1 K/UL (ref 0.3–1)
MONOCYTES NFR BLD: 8.8 % (ref 4–15)
NEUTROPHILS # BLD AUTO: 8.3 K/UL (ref 1.8–7.7)
NEUTROPHILS NFR BLD: 76 % (ref 38–73)
NRBC BLD-RTO: 0 /100 WBC
PLATELET # BLD AUTO: 267 K/UL (ref 150–450)
PMV BLD AUTO: 10.3 FL (ref 9.2–12.9)
POTASSIUM SERPL-SCNC: 5.3 MMOL/L (ref 3.5–5.1)
PROT SERPL-MCNC: 7.2 G/DL (ref 6–8.4)
RBC # BLD AUTO: 4.53 M/UL (ref 4–5.4)
SODIUM SERPL-SCNC: 138 MMOL/L (ref 136–145)
TROPONIN I SERPL HS-MCNC: 108.3 PG/ML (ref 0–14.9)
TROPONIN I SERPL HS-MCNC: 137.4 PG/ML (ref 0–14.9)
WBC # BLD AUTO: 10.96 K/UL (ref 3.9–12.7)

## 2024-01-22 PROCEDURE — 93005 ELECTROCARDIOGRAM TRACING: CPT | Performed by: GENERAL PRACTICE

## 2024-01-22 PROCEDURE — 93010 ELECTROCARDIOGRAM REPORT: CPT | Mod: ,,, | Performed by: GENERAL PRACTICE

## 2024-01-22 PROCEDURE — 80053 COMPREHEN METABOLIC PANEL: CPT | Performed by: EMERGENCY MEDICINE

## 2024-01-22 PROCEDURE — 25000003 PHARM REV CODE 250: Performed by: STUDENT IN AN ORGANIZED HEALTH CARE EDUCATION/TRAINING PROGRAM

## 2024-01-22 PROCEDURE — G0378 HOSPITAL OBSERVATION PER HR: HCPCS

## 2024-01-22 PROCEDURE — 84484 ASSAY OF TROPONIN QUANT: CPT | Performed by: EMERGENCY MEDICINE

## 2024-01-22 PROCEDURE — 25000003 PHARM REV CODE 250: Performed by: INTERNAL MEDICINE

## 2024-01-22 PROCEDURE — 99285 EMERGENCY DEPT VISIT HI MDM: CPT | Mod: 25

## 2024-01-22 PROCEDURE — 36415 COLL VENOUS BLD VENIPUNCTURE: CPT | Performed by: INTERNAL MEDICINE

## 2024-01-22 PROCEDURE — 63600175 PHARM REV CODE 636 W HCPCS: Performed by: INTERNAL MEDICINE

## 2024-01-22 PROCEDURE — 96375 TX/PRO/DX INJ NEW DRUG ADDON: CPT

## 2024-01-22 PROCEDURE — 83735 ASSAY OF MAGNESIUM: CPT | Performed by: EMERGENCY MEDICINE

## 2024-01-22 PROCEDURE — 96374 THER/PROPH/DIAG INJ IV PUSH: CPT

## 2024-01-22 PROCEDURE — 84484 ASSAY OF TROPONIN QUANT: CPT | Mod: 91 | Performed by: INTERNAL MEDICINE

## 2024-01-22 PROCEDURE — 85025 COMPLETE CBC W/AUTO DIFF WBC: CPT | Performed by: EMERGENCY MEDICINE

## 2024-01-22 RX ORDER — IBUPROFEN 200 MG
24 TABLET ORAL
Status: DISCONTINUED | OUTPATIENT
Start: 2024-01-22 | End: 2024-01-24 | Stop reason: HOSPADM

## 2024-01-22 RX ORDER — HYDRALAZINE HYDROCHLORIDE 20 MG/ML
10 INJECTION INTRAMUSCULAR; INTRAVENOUS EVERY 4 HOURS PRN
Status: DISCONTINUED | OUTPATIENT
Start: 2024-01-22 | End: 2024-01-24 | Stop reason: HOSPADM

## 2024-01-22 RX ORDER — ASPIRIN 325 MG
325 TABLET, DELAYED RELEASE (ENTERIC COATED) ORAL ONCE
Status: COMPLETED | OUTPATIENT
Start: 2024-01-22 | End: 2024-01-22

## 2024-01-22 RX ORDER — AMLODIPINE BESYLATE 5 MG/1
5 TABLET ORAL DAILY
Status: DISCONTINUED | OUTPATIENT
Start: 2024-01-23 | End: 2024-01-24 | Stop reason: HOSPADM

## 2024-01-22 RX ORDER — MINOXIDIL 2.5 MG/1
5 TABLET ORAL 2 TIMES DAILY
Status: DISCONTINUED | OUTPATIENT
Start: 2024-01-22 | End: 2024-01-23

## 2024-01-22 RX ORDER — ATORVASTATIN CALCIUM 40 MG/1
80 TABLET, FILM COATED ORAL NIGHTLY
Status: DISCONTINUED | OUTPATIENT
Start: 2024-01-23 | End: 2024-01-24 | Stop reason: HOSPADM

## 2024-01-22 RX ORDER — MORPHINE SULFATE 2 MG/ML
2 INJECTION, SOLUTION INTRAMUSCULAR; INTRAVENOUS EVERY 4 HOURS PRN
Status: DISCONTINUED | OUTPATIENT
Start: 2024-01-22 | End: 2024-01-24 | Stop reason: HOSPADM

## 2024-01-22 RX ORDER — AMLODIPINE BESYLATE 5 MG/1
1 TABLET ORAL DAILY
COMMUNITY

## 2024-01-22 RX ORDER — MINOXIDIL 2.5 MG/1
2 TABLET ORAL 2 TIMES DAILY
COMMUNITY
Start: 2024-01-20

## 2024-01-22 RX ORDER — HYDRALAZINE HYDROCHLORIDE 25 MG/1
25 TABLET, FILM COATED ORAL 2 TIMES DAILY PRN
Status: DISCONTINUED | OUTPATIENT
Start: 2024-01-22 | End: 2024-01-22

## 2024-01-22 RX ORDER — DIPHENHYDRAMINE HYDROCHLORIDE 50 MG/ML
6.25 INJECTION INTRAMUSCULAR; INTRAVENOUS EVERY 6 HOURS PRN
Status: DISCONTINUED | OUTPATIENT
Start: 2024-01-22 | End: 2024-01-24 | Stop reason: HOSPADM

## 2024-01-22 RX ORDER — SODIUM CHLORIDE 0.9 % (FLUSH) 0.9 %
10 SYRINGE (ML) INJECTION
Status: DISCONTINUED | OUTPATIENT
Start: 2024-01-22 | End: 2024-01-24 | Stop reason: HOSPADM

## 2024-01-22 RX ORDER — CLOPIDOGREL BISULFATE 75 MG/1
75 TABLET ORAL DAILY
Status: DISCONTINUED | OUTPATIENT
Start: 2024-01-23 | End: 2024-01-24 | Stop reason: HOSPADM

## 2024-01-22 RX ORDER — ENOXAPARIN SODIUM 100 MG/ML
30 INJECTION SUBCUTANEOUS EVERY 24 HOURS
Status: DISCONTINUED | OUTPATIENT
Start: 2024-01-22 | End: 2024-01-22

## 2024-01-22 RX ORDER — BISACODYL 10 MG/1
10 SUPPOSITORY RECTAL DAILY PRN
Status: DISCONTINUED | OUTPATIENT
Start: 2024-01-22 | End: 2024-01-24 | Stop reason: HOSPADM

## 2024-01-22 RX ORDER — PANTOPRAZOLE SODIUM 40 MG/1
40 TABLET, DELAYED RELEASE ORAL DAILY
Status: DISCONTINUED | OUTPATIENT
Start: 2024-01-23 | End: 2024-01-23

## 2024-01-22 RX ORDER — IBUPROFEN 200 MG
16 TABLET ORAL
Status: DISCONTINUED | OUTPATIENT
Start: 2024-01-22 | End: 2024-01-24 | Stop reason: HOSPADM

## 2024-01-22 RX ORDER — CINACALCET 60 MG/1
2 TABLET, FILM COATED ORAL 2 TIMES DAILY WITH MEALS
COMMUNITY
Start: 2023-12-29

## 2024-01-22 RX ORDER — ACETAMINOPHEN 325 MG/1
650 TABLET ORAL EVERY 6 HOURS PRN
Status: DISCONTINUED | OUTPATIENT
Start: 2024-01-22 | End: 2024-01-24 | Stop reason: HOSPADM

## 2024-01-22 RX ORDER — ASPIRIN 81 MG/1
81 TABLET ORAL DAILY
Status: DISCONTINUED | OUTPATIENT
Start: 2024-01-23 | End: 2024-01-24 | Stop reason: HOSPADM

## 2024-01-22 RX ORDER — NITROGLYCERIN 0.4 MG/1
0.4 TABLET SUBLINGUAL EVERY 5 MIN PRN
Status: DISCONTINUED | OUTPATIENT
Start: 2024-01-22 | End: 2024-01-24 | Stop reason: HOSPADM

## 2024-01-22 RX ORDER — HEPARIN SODIUM 1000 [USP'U]/ML
5000 INJECTION, SOLUTION INTRAVENOUS; SUBCUTANEOUS 3 TIMES DAILY
Status: DISCONTINUED | OUTPATIENT
Start: 2024-01-23 | End: 2024-01-23

## 2024-01-22 RX ORDER — LABETALOL HYDROCHLORIDE 5 MG/ML
10 INJECTION, SOLUTION INTRAVENOUS
Status: DISCONTINUED | OUTPATIENT
Start: 2024-01-22 | End: 2024-01-24 | Stop reason: HOSPADM

## 2024-01-22 RX ORDER — INSULIN ASPART 100 [IU]/ML
0-10 INJECTION, SOLUTION INTRAVENOUS; SUBCUTANEOUS
Status: DISCONTINUED | OUTPATIENT
Start: 2024-01-22 | End: 2024-01-24 | Stop reason: HOSPADM

## 2024-01-22 RX ORDER — ONDANSETRON HYDROCHLORIDE 2 MG/ML
4 INJECTION, SOLUTION INTRAVENOUS EVERY 6 HOURS PRN
Status: DISCONTINUED | OUTPATIENT
Start: 2024-01-22 | End: 2024-01-24 | Stop reason: HOSPADM

## 2024-01-22 RX ORDER — GLUCAGON 1 MG
1 KIT INJECTION
Status: DISCONTINUED | OUTPATIENT
Start: 2024-01-22 | End: 2024-01-24 | Stop reason: HOSPADM

## 2024-01-22 RX ORDER — DIPHENHYDRAMINE HYDROCHLORIDE 50 MG/ML
6.25 INJECTION INTRAMUSCULAR; INTRAVENOUS ONCE
Status: COMPLETED | OUTPATIENT
Start: 2024-01-22 | End: 2024-01-22

## 2024-01-22 RX ADMIN — HYDRALAZINE HYDROCHLORIDE 10 MG: 20 INJECTION INTRAMUSCULAR; INTRAVENOUS at 06:01

## 2024-01-22 RX ADMIN — DIPHENHYDRAMINE HYDROCHLORIDE 6.25 MG: 50 INJECTION INTRAMUSCULAR; INTRAVENOUS at 08:01

## 2024-01-22 RX ADMIN — ASPIRIN 325 MG: 325 TABLET, COATED ORAL at 10:01

## 2024-01-22 RX ADMIN — MINOXIDIL 5 MG: 2.5 TABLET ORAL at 08:01

## 2024-01-22 RX ADMIN — ACETAMINOPHEN 650 MG: 325 TABLET ORAL at 11:01

## 2024-01-22 RX ADMIN — SODIUM ZIRCONIUM CYCLOSILICATE 10 G: 10 POWDER, FOR SUSPENSION ORAL at 10:01

## 2024-01-22 NOTE — ED PROVIDER NOTES
Encounter Date: 1/22/2024       History     Chief Complaint   Patient presents with    Vascular Access Problem     Patient arrives via EMS from Dialysis for pain at her port site. States she started experiencing pain at her site and nausea during treatment. Pain and nausea has since subsided since treatment stopped.      58-year-old female Monday Wednesday Friday dialysis, AFib, diabetes, diabetic ulcer left foot with partial amputation, hypertension hyperlipidemia stroke presents to the ER with possible right chest hemodialysis catheter malfunction.  She was at dialysis today when they started her dialysis she began to have right-sided chest pain at the site of her dialysis catheter had nausea and vomited.  Symptoms resolved when they terminated her dialysis.  She only got several minutes of dialysis.     She presents to the hospital for evaluation.  She is asymptomatic at this time without any chest pain or nausea or vomiting or shortness of breath.  Recently hospitalized last month in [place] for left AV fistula malfunction.  She thinks she was at [facility].  She states that Dr. Khoobehi attempted to fix her fistula but was unable to so the dialysis catheter was placed.  Scheduled for surgery February 1st to either revise or replace the left upper extremity fistula.    The history is provided by the patient.     Review of patient's allergies indicates:   Allergen Reactions    Dilaudid [hydromorphone] Nausea And Vomiting    Chlorhexidine Itching    Lortab [hydrocodone-acetaminophen] Itching     Past Medical History:   Diagnosis Date    A-fib     resolved per patient    Anemia due to end stage renal disease 6/30/2022    Arthritis     Bronchitis     Cardiovascular event risk, ASCVD 10-year risk 6.7% 10/15/2016    Diabetes mellitus type II     Diabetic foot infection     Diabetic ulcer of left midfoot 05/05/2022    Disorder of kidney and ureter     Encounter for blood transfusion     ESRD (end stage renal  disease) 05/18/2018    MANJINDER (generalized anxiety disorder) 10/25/2016    History of colon polyps 11/02/2016    Hyperlipidemia     Hypertension     Stroke      Past Surgical History:   Procedure Laterality Date    ANGIOGRAPHY OF LOWER EXTREMITY Left 10/18/2022    Procedure: Angiogram Extremity Unilateral;  Surgeon: Ali Khoobehi, MD;  Location: [facility] CATH/EP LAB;  Service: Vascular;  Laterality: Left;    AV FISTULA PLACEMENT Left 8/29/2022    Procedure: CREATION, AV FISTULA;  Surgeon: Ali Khoobehi, MD;  Location: [facility] OR;  Service: Vascular;  Laterality: Left;    BONE BIOPSY Left 8/24/2022    Procedure: Biopsy-Bone;  Surgeon: Porfirio Ponce DPM;  Location: [facility] OR;  Service: Podiatry;  Laterality: Left;    COLONOSCOPY N/A 10/5/2016    Procedure: COLONOSCOPY;  Surgeon: Pillo Chanel MD;  Location: [facility] ENDO;  Service: Endoscopy;  Laterality: N/A;    COLONOSCOPY N/A 4/26/2022    Procedure: COLONOSCOPY;  Surgeon: Saravanan Rachel MD;  Location: [facility] ENDO;  Service: Endoscopy;  Laterality: N/A;    FISTULOGRAM Left 8/24/2022    Procedure: Fistulogram;  Surgeon: Ali Khoobehi, MD;  Location: [facility] CATH/EP LAB;  Service: Vascular;  Laterality: Left;    HERNIA REPAIR Bilateral 11/22/2016    inguinal    HYSTERECTOMY      INCISION AND DRAINAGE FOOT Left 5/13/2022    Procedure: INCISION AND DRAINAGE, FOOT;  Surgeon: Ricardo Bruno DPM;  Location: [facility] OR;  Service: Podiatry;  Laterality: Left;    OOPHORECTOMY      THROMBECTOMY, AV FISTULA, UPPER EXTREMITY, PERCUTANEOUS  8/24/2022    Procedure: THROMBECTOMY, AV FISTULA, UPPER EXTREMITY, PERCUTANEOUS;  Surgeon: Ali Khoobehi, MD;  Location: [facility] CATH/EP LAB;  Service: Vascular;;    TOE AMPUTATION Left 8/26/2022    Procedure: AMPUTATION, TOE;  Surgeon: Porfirio Ponce DPM;  Location: [facility] OR;  Service: Podiatry;  Laterality: Left;     Family History   Problem Relation Age of Onset    Hyperlipidemia Mother     Hypertension Mother     Hypertension Father     Diabetes Father      Diabetes Sister     Diabetes Sister     Diabetes Maternal Aunt     Diabetes Maternal Grandmother     Heart disease Maternal Grandmother     Cancer Paternal Grandmother     Breast cancer Neg Hx     Colon cancer Neg Hx     Ovarian cancer Neg Hx      Social History     Tobacco Use    Smoking status: Never    Smokeless tobacco: Never   Substance Use Topics    Alcohol use: No    Drug use: No     Review of Systems   Respiratory:  Negative for shortness of breath.    Cardiovascular:  Positive for chest pain (gone).   Gastrointestinal:  Positive for nausea and vomiting.   All other systems reviewed and are negative.      Physical Exam     Initial Vitals [01/22/24 1254]   BP Pulse Resp Temp SpO2   (!) 203/85 84 16 98 °F (36.7 °C) 99 %      MAP       --         Physical Exam    Nursing note and vitals reviewed.  Constitutional: She appears well-developed and well-nourished. She is not diaphoretic.  Non-toxic appearance. She does not have a sickly appearance. She appears ill (chronically ill appearing). No distress.   HENT:   Head: Normocephalic and atraumatic.   Eyes: EOM are normal.   Neck: Neck supple.   Normal range of motion.  Cardiovascular:  Normal rate, regular rhythm and normal heart sounds.     Exam reveals no gallop and no friction rub.       No murmur heard.  Left upper arm AV fistula with a good thrill    Right upper chest dialysis catheter   Pulmonary/Chest: Breath sounds normal. No respiratory distress. She has no wheezes. She has no rhonchi. She has no rales.   Musculoskeletal:         General: Normal range of motion.      Cervical back: Normal range of motion and neck supple.      Comments: Left midfoot amputation     Neurological: She is alert and oriented to person, place, and time.   Skin: Skin is warm and dry.   Psychiatric: She has a normal mood and affect. Her behavior is normal. Judgment and thought content normal.         ED Course   Procedures  Labs Reviewed   CBC W/ AUTO DIFFERENTIAL - Abnormal;  Notable for the following components:       Result Value    RDW 15.0 (*)     Immature Granulocytes 0.6 (*)     Gran # (ANC) 8.3 (*)     Immature Grans (Abs) 0.07 (*)     Gran % 76.0 (*)     Lymph % 12.6 (*)     All other components within normal limits   COMPREHENSIVE METABOLIC PANEL - Abnormal; Notable for the following components:    Potassium 5.3 (*)     Glucose 116 (*)     BUN 74 (*)     Creatinine 7.1 (*)     eGFR 6.2 (*)     All other components within normal limits   TROPONIN I HIGH SENSITIVITY - Abnormal; Notable for the following components:    Troponin I High Sensitivity 137.4 (*)     All other components within normal limits   MAGNESIUM   TROPONIN I HIGH SENSITIVITY   POCT GLUCOSE MONITORING CONTINUOUS        ECG Results              EKG 12-lead (In process)  Result time 01/22/24 14:24:52      In process by Interface, Lab In [facility] (01/22/24 14:24:52)                   Narrative:    Test Reason : R07.9,    Vent. Rate : 080 BPM     Atrial Rate : 080 BPM     P-R Int : 118 ms          QRS Dur : 086 ms      QT Int : 388 ms       P-R-T Axes : 023 -42 069 degrees     QTc Int : 447 ms    Normal sinus rhythm  Left axis deviation  Minimal voltage criteria for LVH, may be normal variant  Abnormal ECG  When compared with ECG of 03-OCT-2023 11:24,  Criteria for Septal infarct are no longer Present    Referred By: [email or web address]   SELF           Confirmed By:                                   Imaging Results              X-Ray Chest AP Portable (Final result)  Result time 01/22/24 14:35:16      Final result by Joseph Stephens MD (01/22/24 14:35:16)                   Narrative:    Chest single view    CLINICAL DATA: Pain at Port-A-Cath site    FINDINGS: AP view is compared to November 2022.    Heart size is normal. The mediastinum is unremarkable. The lungs are clear. No acute osseous abnormality is identified.    IMPRESSION:  1. No acute process.    Electronically signed by:  Joseph Stephens MD  01/22/2024 02:35 PM  CST Workstation: [id number]                                     Medications   amLODIPine tablet 5 mg (has no administration in time range)   atorvastatin tablet 80 mg (has no administration in time range)   clopidogreL tablet 75 mg (has no administration in time range)   minoxidiL tablet 5 mg (has no administration in time range)   pantoprazole EC tablet 40 mg (has no administration in time range)   glucose chewable tablet 16 g (has no administration in time range)   glucose chewable tablet 24 g (has no administration in time range)   dextrose 50% injection 12.5 g (has no administration in time range)   dextrose 50% injection 25 g (has no administration in time range)   glucagon (human recombinant) injection 1 mg (has no administration in time range)   enoxaparin injection 30 mg (has no administration in time range)   insulin aspart U-100 pen 0-10 Units (has no administration in time range)   ondansetron injection 4 mg (has no administration in time range)   hydrALAZINE injection 10 mg (has no administration in time range)     Medical Decision Making  58-year-old female with multiple medical problems including dialysis, diabetes with previous partial foot amputation presents with an episode of nausea and vomiting that occurred during dialysis.  She also had some pain at her right hemodialysis catheter site.  I spoke with the nurse at dialysis clinic who states that her catheter appeared to be functioning normally prior to dialysis.  They did have some issue with the dialysis machine but this was sorted out prior to starting dialysis.  Symptoms began after several minutes of dialysis the patient was sent here for evaluation.  Her troponin is marginal likely secondary to her kidney disease.  I think the safest thing would be to admit her to the hospital for serial troponins, likely dialysis as an inpatient to make sure her symptoms do not return.  Has been asymptomatic her entire time in the emergency room.    Amount  and/or Complexity of Data Reviewed  Independent Historian:      Details: Nursing staff at dialysis  Labs: ordered. Decision-making details documented in ED Course.  Radiology: ordered. Decision-making details documented in ED Course.  Discussion of management or test interpretation with external provider(s): Spoke to the patient's nephrologist x2 Dr. Velarde and hospital medicine Dr Stacy regarding admission    Risk  Decision regarding hospitalization.               ED Course as of 01/22/24 1639   Mon Jan 22, 2024   1323 BP(!): 203/85 [EF]   1323 Temp: 98 °F (36.7 °C) [EF]   1323 Temp Source: Oral [EF]   1323 Pulse: 84 [EF]   1323 Resp: 16 [EF]   1323 SpO2: 99 % [EF]   1351 I spoke with Keri at dialysis clinic.  She states that initially they had to adjust some settings or fix something on the dialysis machine but it was working fine when the patient was hooked up.  They checked the patient's arterial and venous side of her dialysis catheter before attaching her to the machine in there was nothing unusual.  Patient was on the dialysis machine a few minutes without issues and then began to develop nausea and pain at her catheter site.  They stopped dialysis and sent her to the ER.  She never had any chest heaviness or pressure.  Her nephrologist is aware.  Patient is asymptomatic at this time.  They checked her vital signs during this event and they were unremarkable. [EF]   1356 Left message with Nephrology office to call me in the emergency room. [EF]   1404 Case d/w dr velarde who thinks if labs and imaging ok can DC home for HD tomorrow, I'll call him later with update.  Cell phone [phone]. [EF]   1416 WBC: 10.96 [EF]   1416 Hemoglobin: 12.8 [EF]   1416 Platelet Count: 267 [EF]   1430 Chest x-ray appears unremarkable no obvious pneumothorax.  Dialysis catheter in place.  Left subclavian stent in place.  Independent interpretation, this is prior to official radiology read. [EF]   1431 Sinus rhythm left  axis LVH 80 beats per minute no ST elevation or depression no change from prior EKGs independently interpreted [EF]   1434 Patient updated regarding the plan, remains asymptomatic [EF]   1441 X-Ray Chest AP Portable [EF]   1503 BUN(!): 74 [EF]   1503 Creatinine(!): 7.1 [EF]   1503 Potassium(!): 5.3 [EF]   1543 Troponin I High Sensitivity(!!): 137.4 [EF]   1605 Dr combs to admit [EF]      ED Course User Index  [EF] Kee Felix MD                           Clinical Impression:  Final diagnoses:  [R07.9] Chest pain  [T82.9XXA] Complication associated with dialysis catheter          ED Disposition Condition    Observation Stable                Kee Felix MD  01/22/24 1640

## 2024-01-23 ENCOUNTER — CLINICAL SUPPORT (OUTPATIENT)
Dept: CARDIOLOGY | Facility: HOSPITAL | Age: 59
End: 2024-01-23
Attending: STUDENT IN AN ORGANIZED HEALTH CARE EDUCATION/TRAINING PROGRAM
Payer: MEDICARE

## 2024-01-23 VITALS — HEIGHT: 67 IN | WEIGHT: 173.5 LBS | BODY MASS INDEX: 27.23 KG/M2

## 2024-01-23 PROBLEM — R11.14 BILIOUS VOMITING WITH NAUSEA: Status: ACTIVE | Noted: 2024-01-23

## 2024-01-23 LAB
ALBUMIN SERPL BCP-MCNC: 3.6 G/DL (ref 3.5–5.2)
ALP SERPL-CCNC: 71 U/L (ref 55–135)
ALT SERPL W/O P-5'-P-CCNC: 9 U/L (ref 10–44)
ANION GAP SERPL CALC-SCNC: 11 MMOL/L (ref 8–16)
AORTIC ROOT ANNULUS: 3.2 CM
AORTIC VALVE CUSP SEPERATION: 1.7 CM
AST SERPL-CCNC: 11 U/L (ref 10–40)
AV INDEX (PROSTH): 0.63
AV MEAN GRADIENT: 12 MMHG
AV PEAK GRADIENT: 22 MMHG
AV VALVE AREA BY VELOCITY RATIO: 1.59 CM²
AV VALVE AREA: 1.78 CM²
AV VELOCITY RATIO: 0.56
BILIRUB SERPL-MCNC: 0.4 MG/DL (ref 0.1–1)
BSA FOR ECHO PROCEDURE: 1.93 M2
BUN SERPL-MCNC: 83 MG/DL (ref 6–20)
CALCIUM SERPL-MCNC: 9.3 MG/DL (ref 8.7–10.5)
CHLORIDE SERPL-SCNC: 101 MMOL/L (ref 95–110)
CO2 SERPL-SCNC: 25 MMOL/L (ref 23–29)
CREAT SERPL-MCNC: 8.2 MG/DL (ref 0.5–1.4)
CV ECHO LV RWT: 0.58 CM
DOP CALC AO PEAK VEL: 2.36 M/S
DOP CALC AO VTI: 48.3 CM
DOP CALC LVOT AREA: 2.8 CM2
DOP CALC LVOT DIAMETER: 1.9 CM
DOP CALC LVOT PEAK VEL: 1.32 M/S
DOP CALC LVOT STROKE VOLUME: 85.87 CM3
DOP CALC MV VTI: 42 CM
DOP CALCLVOT PEAK VEL VTI: 30.3 CM
E WAVE DECELERATION TIME: 218 MSEC
E/A RATIO: 0.96
E/E' RATIO: 19.14 M/S
ECHO LV POSTERIOR WALL: 1.17 CM (ref 0.6–1.1)
ERYTHROCYTE [DISTWIDTH] IN BLOOD BY AUTOMATED COUNT: 15.1 % (ref 11.5–14.5)
EST. GFR  (NO RACE VARIABLE): 5.2 ML/MIN/1.73 M^2
FRACTIONAL SHORTENING: 27 % (ref 28–44)
GLUCOSE SERPL-MCNC: 106 MG/DL (ref 70–110)
GLUCOSE SERPL-MCNC: 175 MG/DL (ref 70–110)
GLUCOSE SERPL-MCNC: 87 MG/DL (ref 70–110)
GLUCOSE SERPL-MCNC: 92 MG/DL (ref 70–110)
GLUCOSE SERPL-MCNC: 97 MG/DL (ref 70–110)
HCT VFR BLD AUTO: 34.8 % (ref 37–48.5)
HGB BLD-MCNC: 11.1 G/DL (ref 12–16)
INTERVENTRICULAR SEPTUM: 1.24 CM (ref 0.6–1.1)
IVC DIAMETER: 1.97 CM
LEFT INTERNAL DIMENSION IN SYSTOLE: 2.97 CM (ref 2.1–4)
LEFT VENTRICLE DIASTOLIC VOLUME INDEX: 38.16 ML/M2
LEFT VENTRICLE DIASTOLIC VOLUME: 72.5 ML
LEFT VENTRICLE MASS INDEX: 90 G/M2
LEFT VENTRICLE SYSTOLIC VOLUME INDEX: 18 ML/M2
LEFT VENTRICLE SYSTOLIC VOLUME: 34.2 ML
LEFT VENTRICULAR INTERNAL DIMENSION IN DIASTOLE: 4.06 CM (ref 3.5–6)
LEFT VENTRICULAR MASS: 170.26 G
LV LATERAL E/E' RATIO: 16.75 M/S
LV SEPTAL E/E' RATIO: 22.33 M/S
LVOT MG: 4 MMHG
LVOT MV: 0.94 CM/S
MCH RBC QN AUTO: 28.5 PG (ref 27–31)
MCHC RBC AUTO-ENTMCNC: 31.9 G/DL (ref 32–36)
MCV RBC AUTO: 89 FL (ref 82–98)
MV MEAN GRADIENT: 5 MMHG
MV PEAK A VEL: 1.39 M/S
MV PEAK E VEL: 1.34 M/S
MV PEAK GRADIENT: 9 MMHG
MV STENOSIS PRESSURE HALF TIME: 73 MS
MV VALVE AREA BY CONTINUITY EQUATION: 2.04 CM2
MV VALVE AREA P 1/2 METHOD: 3.01 CM2
OHS LV EJECTION FRACTION SIMPSONS BIPLANE MOD: 73 %
PISA MRMAX VEL: 5.95 M/S
PISA TR MAX VEL: 2.49 M/S
PLATELET # BLD AUTO: 257 K/UL (ref 150–450)
PMV BLD AUTO: 10.3 FL (ref 9.2–12.9)
POTASSIUM SERPL-SCNC: 5.4 MMOL/L (ref 3.5–5.1)
PROT SERPL-MCNC: 6.3 G/DL (ref 6–8.4)
PV MV: 0.76 M/S
PV PEAK GRADIENT: 5 MMHG
PV PEAK VELOCITY: 1.08 M/S
RA PRESSURE ESTIMATED: 3 MMHG
RBC # BLD AUTO: 3.9 M/UL (ref 4–5.4)
RIGHT VENTRICULAR END-DIASTOLIC DIMENSION: 2.3 CM
RV TB RVSP: 5 MMHG
RV TISSUE DOPPLER FREE WALL SYSTOLIC VELOCITY 1 (APICAL 4 CHAMBER VIEW): 13.4 CM/S
SODIUM SERPL-SCNC: 137 MMOL/L (ref 136–145)
TDI LATERAL: 0.08 M/S
TDI SEPTAL: 0.06 M/S
TDI: 0.07 M/S
TR MAX PG: 25 MMHG
TRICUSPID ANNULAR PLANE SYSTOLIC EXCURSION: 2.22 CM
TROPONIN I SERPL HS-MCNC: 106.9 PG/ML (ref 0–14.9)
TV REST PULMONARY ARTERY PRESSURE: 28 MMHG
WBC # BLD AUTO: 7.79 K/UL (ref 3.9–12.7)
Z-SCORE OF LEFT VENTRICULAR DIMENSION IN END DIASTOLE: -2.72
Z-SCORE OF LEFT VENTRICULAR DIMENSION IN END SYSTOLE: -0.8

## 2024-01-23 PROCEDURE — 96372 THER/PROPH/DIAG INJ SC/IM: CPT | Performed by: INTERNAL MEDICINE

## 2024-01-23 PROCEDURE — 92523 SPEECH SOUND LANG COMPREHEN: CPT

## 2024-01-23 PROCEDURE — 63600175 PHARM REV CODE 636 W HCPCS: Performed by: INTERNAL MEDICINE

## 2024-01-23 PROCEDURE — 97161 PT EVAL LOW COMPLEX 20 MIN: CPT

## 2024-01-23 PROCEDURE — 25000003 PHARM REV CODE 250: Performed by: INTERNAL MEDICINE

## 2024-01-23 PROCEDURE — 99203 OFFICE O/P NEW LOW 30 MIN: CPT | Mod: ,,, | Performed by: FAMILY MEDICINE

## 2024-01-23 PROCEDURE — 80053 COMPREHEN METABOLIC PANEL: CPT | Performed by: INTERNAL MEDICINE

## 2024-01-23 PROCEDURE — 96375 TX/PRO/DX INJ NEW DRUG ADDON: CPT

## 2024-01-23 PROCEDURE — 90935 HEMODIALYSIS ONE EVALUATION: CPT

## 2024-01-23 PROCEDURE — 25000003 PHARM REV CODE 250: Performed by: STUDENT IN AN ORGANIZED HEALTH CARE EDUCATION/TRAINING PROGRAM

## 2024-01-23 PROCEDURE — G0257 UNSCHED DIALYSIS ESRD PT HOS: HCPCS

## 2024-01-23 PROCEDURE — C9113 INJ PANTOPRAZOLE SODIUM, VIA: HCPCS | Performed by: INTERNAL MEDICINE

## 2024-01-23 PROCEDURE — 82962 GLUCOSE BLOOD TEST: CPT

## 2024-01-23 PROCEDURE — 97165 OT EVAL LOW COMPLEX 30 MIN: CPT

## 2024-01-23 PROCEDURE — 92610 EVALUATE SWALLOWING FUNCTION: CPT

## 2024-01-23 PROCEDURE — 93306 TTE W/DOPPLER COMPLETE: CPT

## 2024-01-23 PROCEDURE — 93306 TTE W/DOPPLER COMPLETE: CPT | Mod: 26,,, | Performed by: INTERNAL MEDICINE

## 2024-01-23 PROCEDURE — 85027 COMPLETE CBC AUTOMATED: CPT | Performed by: INTERNAL MEDICINE

## 2024-01-23 PROCEDURE — 36415 COLL VENOUS BLD VENIPUNCTURE: CPT | Performed by: INTERNAL MEDICINE

## 2024-01-23 PROCEDURE — G0378 HOSPITAL OBSERVATION PER HR: HCPCS

## 2024-01-23 RX ORDER — HEPARIN SODIUM 5000 [USP'U]/ML
5000 INJECTION, SOLUTION INTRAVENOUS; SUBCUTANEOUS EVERY 8 HOURS
Status: DISCONTINUED | OUTPATIENT
Start: 2024-01-23 | End: 2024-01-24 | Stop reason: HOSPADM

## 2024-01-23 RX ORDER — HEPARIN SODIUM 1000 [USP'U]/ML
4000 INJECTION, SOLUTION INTRAVENOUS; SUBCUTANEOUS
Status: DISCONTINUED | OUTPATIENT
Start: 2024-01-23 | End: 2024-01-24 | Stop reason: HOSPADM

## 2024-01-23 RX ORDER — MUPIROCIN 20 MG/G
OINTMENT TOPICAL 2 TIMES DAILY
Status: DISCONTINUED | OUTPATIENT
Start: 2024-01-23 | End: 2024-01-24 | Stop reason: HOSPADM

## 2024-01-23 RX ORDER — PANTOPRAZOLE SODIUM 40 MG/10ML
40 INJECTION, POWDER, LYOPHILIZED, FOR SOLUTION INTRAVENOUS EVERY 12 HOURS
Status: DISCONTINUED | OUTPATIENT
Start: 2024-01-23 | End: 2024-01-24 | Stop reason: HOSPADM

## 2024-01-23 RX ORDER — SODIUM CHLORIDE 9 MG/ML
INJECTION, SOLUTION INTRAVENOUS ONCE
Status: DISCONTINUED | OUTPATIENT
Start: 2024-01-23 | End: 2024-01-24 | Stop reason: HOSPADM

## 2024-01-23 RX ADMIN — PANTOPRAZOLE SODIUM 40 MG: 40 TABLET, DELAYED RELEASE ORAL at 05:01

## 2024-01-23 RX ADMIN — MUPIROCIN 1 G: 20 OINTMENT TOPICAL at 09:01

## 2024-01-23 RX ADMIN — CLOPIDOGREL BISULFATE 75 MG: 75 TABLET, FILM COATED ORAL at 08:01

## 2024-01-23 RX ADMIN — HEPARIN SODIUM 4000 UNITS: 1000 INJECTION, SOLUTION INTRAVENOUS; SUBCUTANEOUS at 03:01

## 2024-01-23 RX ADMIN — ASPIRIN 81 MG: 81 TABLET, COATED ORAL at 08:01

## 2024-01-23 RX ADMIN — HEPARIN SODIUM 5000 UNITS: 5000 INJECTION INTRAVENOUS; SUBCUTANEOUS at 11:01

## 2024-01-23 RX ADMIN — MUPIROCIN 1 G: 20 OINTMENT TOPICAL at 08:01

## 2024-01-23 RX ADMIN — HEPARIN SODIUM 5000 UNITS: 5000 INJECTION INTRAVENOUS; SUBCUTANEOUS at 09:01

## 2024-01-23 RX ADMIN — PANTOPRAZOLE SODIUM 40 MG: 40 INJECTION, POWDER, LYOPHILIZED, FOR SOLUTION INTRAVENOUS at 08:01

## 2024-01-23 RX ADMIN — ACETAMINOPHEN 650 MG: 325 TABLET ORAL at 11:01

## 2024-01-23 RX ADMIN — AMLODIPINE BESYLATE 5 MG: 5 TABLET ORAL at 08:01

## 2024-01-23 RX ADMIN — ATORVASTATIN CALCIUM 80 MG: 40 TABLET, FILM COATED ORAL at 08:01

## 2024-01-23 NOTE — NURSING
Nurses Note -- 4 Eyes      1/23/2024   12:09 AM      Skin assessed during: Transfer      [] No Altered Skin Integrity Present    []Prevention Measures Documented      [x] Yes- Altered Skin Integrity Present or Discovered   [x] LDA Added if Not in Epic (Describe Wound)   [x] New Altered Skin Integrity was Present on Admit and Documented in LDA   [x] Wound Image Taken    Wound Care Consulted? Yes    Attending Nurse:  Leanna Bailey RN/Staff Member:   [id number]

## 2024-01-23 NOTE — ASSESSMENT & PLAN NOTE
Seems atypical associated with HD catheter  Serial cardiac enzymes  Tele monitoring   Presently chest pain free  Pt had abnormal stress test which was done in 2022

## 2024-01-23 NOTE — PROGRESS NOTES
[facility] Medicine  Progress Note    Patient Name: Leanna García  MRN: [id number]  Patient Class: OP- Observation   Admission Date: 1/22/2024  Length of Stay: 0 days  Attending Physician: Woody Lynn MD  Primary Care Provider: Stefano Lopez MD        Subjective:     Principal Problem:Complication of vascular dialysis catheter        HPI:  58 year old pt getting admitted from Dialysis center in [place] with chest pain  Pt was supposed to have HD today  She started experiencing pain on R side of chest near the Dialysis catheter area when HD was started  Pt experienced severe chest pain followed by nausea with one episode of vomiting  When Hd was stopped her chest pain mostly went away  Pt has AV fistula on LUE and she was supposed to have a revision surgery on Feb 2024 because fistula is not working  Denies any other c/o or issues  Pts Nephrologist is Dr Madie Ta MD  Pt established care with Dr STEVE Adame on October 2023        Overview/Hospital Course:  1/23/2024  Ms García notes multiple episodes of nausea and vomiting with 1 episode of RUQ abdominal pain. She had problens with her dialysis port yesterday with severe nausea and vomiting  I have consulted nephrology  I have ordered a CT abdomen and pelvis without contrast  Protonix 40 mg IVP Q 12      Interval History: Ms García is not currently nauseated or vomiting. She has had recent RUQ pain.  Nephrology is consulted for dialysisGI consulted    Review of Systems   Constitutional:  Positive for activity change, appetite change, diaphoresis and fatigue.   HENT: Negative.     Eyes: Negative.    Respiratory: Negative.     Cardiovascular: Negative.    Gastrointestinal: Negative.    Endocrine: Positive for heat intolerance.   Genitourinary:  Positive for decreased urine volume.   Musculoskeletal:  Positive for arthralgias and myalgias.   Skin: Negative.    Allergic/Immunologic: Positive for immunocompromised state.    Neurological:  Positive for weakness.   Hematological:  Bruises/bleeds easily.   Psychiatric/Behavioral:  The patient is nervous/anxious.      Objective:     Vital Signs (Most Recent):  Temp: 97.6 °F (36.4 °C) (01/23/24 0701)  Pulse: 78 (01/23/24 0701)  Resp: 18 (01/23/24 0701)  BP: 130/67 (01/23/24 0701)  SpO2: 97 % (01/23/24 0701) Vital Signs (24h Range):  Temp:  [97.3 °F (36.3 °C)-98.8 °F (37.1 °C)] 97.6 °F (36.4 °C)  Pulse:  [78-90] 78  Resp:  [16-20] 18  SpO2:  [95 %-99 %] 97 %  BP: (122-203)/(60-85) 130/67     Weight: 78.7 kg (173 lb 8 oz)  Body mass index is 27.17 kg/m².    Intake/Output Summary (Last 24 hours) at 1/23/2024 1227  Last data filed at 1/23/2024 0343  Gross per 24 hour   Intake 240 ml   Output --   Net 240 ml         Physical Exam  Vitals and nursing note reviewed.   Constitutional:       Appearance: Normal appearance.   HENT:      Head: Normocephalic and atraumatic.      Nose: Nose normal.      Mouth/Throat:      Mouth: Mucous membranes are moist.   Eyes:      Extraocular Movements: Extraocular movements intact.      Pupils: Pupils are equal, round, and reactive to light.   Cardiovascular:      Rate and Rhythm: Normal rate and regular rhythm.   Pulmonary:      Effort: Pulmonary effort is normal.      Breath sounds: Normal breath sounds.   Abdominal:      General: Bowel sounds are normal. There is distension.      Tenderness: There is abdominal tenderness. There is no guarding or rebound.   Musculoskeletal:         General: Normal range of motion.      Cervical back: Normal range of motion and neck supple.   Skin:     General: Skin is warm.   Neurological:      Mental Status: She is oriented to person, place, and time.   Psychiatric:      Comments: Mildly dulled affect             Significant Labs: All pertinent labs within the past 24 hours have been reviewed.  Recent Lab Results  (Last 5 results in the past 24 hours)        01/23/24  1135   01/23/24  0434   01/23/24  0048   01/22/24  2342    01/22/24  1745        Albumin   3.6             ALP   71             ALT   9             Anion Gap   11             AST   11             BILIRUBIN TOTAL   0.4  Comment: For infants and newborns, interpretation of results should be based  on gestational age, weight and in agreement with clinical  observations.    Premature Infant recommended reference ranges:  Up to 24 hours.............<8.0 mg/dL  Up to 48 hours............<12.0 mg/dL  3-5 days..................<15.0 mg/dL  6-29 days.................<15.0 mg/dL               BUN   83             Calcium   9.3             Chloride   101             CO2   25             Creatinine   8.2             eGFR   5.2             Glucose   92             Hematocrit   34.8             Hemoglobin   11.1             MCH   28.5             MCHC   31.9             MCV   89             MPV   10.3             Platelet Count   257             POC Glucose 87     97           Potassium   5.4             PROTEIN TOTAL   6.3             RBC   3.90             RDW   15.1             Sodium   137             Troponin I High Sensitivity       106.9  Comment: Troponin results differ between methods. Do not use   results between Troponin methods interchangeably.    Alkaline Phospatase levels above 400 U/L may   cause false positive results.    Access hsTnI should not be used for patients taking   Asfotase chris (Strensiq).  Critical result [facility]NI[facility] 106.9 pg/mL called to and read back by Leanna Barrientos Rn at 23-Jan-2024 00:34 by [facility].     108.3  Comment: Troponin results differ between methods. Do not use   results between Troponin methods interchangeably.    Alkaline Phospatase levels above 400 U/L may   cause false positive results.    Access hsTnI should not be used for patients taking   Asfotase chris (Strensiq).  Critical result TNIHS 108.3 pg/mL called to and read back by Dylan Roche  at 22-Jan-2024 18:54 [facility].  Result Rechecked         WBC   7.79                                     Significant Imaging: I have reviewed all pertinent imaging results/findings within the past 24 hours.    Assessment/Plan:      * Complication of vascular dialysis catheter  Consult vascular surgeon       Bilious vomiting with nausea  Several instances or RUQ pain  Protonix 40 mg IVP Q 12  CT abdomen and pelvis without contrast  GI consulted      Chest pain  Seems atypical associated with HD catheter  Serial cardiac enzymes  Tele monitoring   Presently chest pain free  Pt had abnormal stress test which was done in 2022        PAD (peripheral artery disease)  Aware  Maintain plavix      Type 2 diabetes mellitus with kidney complication, with long-term current use of insulin  Maintain SSI   Latest Reference Range & Units 05/29/22 13:24 06/22/22 06:18 10/17/22 18:01 06/06/23 13:47   Hemoglobin A1C External 4.0 - 5.6 % 8.3 (H) 7.5 (H) 7.2 (H) 8.3 (H)         ESRD (end stage renal disease)  Creatine stable for now. BMP reviewed- noted Estimated Creatinine Clearance: 9.4 mL/min (A) (based on SCr of 7.1 mg/dL (H)). according to latest data. Based on current GFR, CKD stage is end stage.  Monitor UOP and serial BMP and adjust therapy as needed. Renally dose meds. Avoid nephrotoxic medications and procedures.    MANJINDER (generalized anxiety disorder)  Aware       Chronic diastolic heart failure  Chronic   Stable       History of atrial fibrillation, 2015  Stable       Hypertension associated with diabetes  Maintain present regime        VTE Risk Mitigation (From admission, onward)           Ordered     heparin (porcine) injection 5,000 Units  Every 8 hours         01/23/24 1039     IP VTE HIGH RISK PATIENT  Once         01/22/24 2138     Place sequential compression device  Until discontinued         01/22/24 2138     Reason for No Pharmacological VTE Prophylaxis  Once        Question:  Reasons:  Answer:  Already adequately anticoagulated on oral Anticoagulants    01/22/24 2138     Place sequential compression device   Until discontinued         01/22/24 1611                    Discharge Planning   ERI: 1/25/2024     Code Status: Full Code   Is the patient medically ready for discharge?:     Reason for patient still in hospital (select all that apply): Patient new problem and Treatment                     Woody Lynn MD  Department of Hospital Medicine   [facility]

## 2024-01-23 NOTE — PLAN OF CARE
[facility]  Initial Discharge Assessment       Primary Care Provider: Stefano Lopez MD    Admission Diagnosis: Complication associated with dialysis catheter [T82.9XXA]  Chest pain [R07.9]    Admission Date: 1/22/2024  Expected Discharge Date: 1/25/2024     completed discharge assessment with Pt at bedside. Pt AAOx4s. Pt lives with spouse (Donovan García (Spouse)  [phone] (Mobile)). Demographics, PCP, and insurance verified. No home health. Pt has dialysis with [facility] ([place] on MWF. Pt completes ADLs with the assistance of walker, rolling; wheelchair; glucometer . Pt to discharge home via family transport. Pt has no other needs to be addressed at this time.    Transition of Care Barriers: None    Payor: HU[employer] MANAGED MEDICARE / Plan: HU[employer] MEDICARE HMO / Product Type: Capitation /     Extended Emergency Contact Information  Primary Emergency Contact: Donovan García  Address: [address] [place] [address] [place]  Mobile Phone: [phone]  Relation: Spouse  Preferred language: English   needed? No  Secondary Emergency Contact: Sonia Cota  Address: [address] [place] [address] [place]  Mobile Phone: [phone]  Relation: Mother  Preferred language: English   needed? No    Discharge Plan A: Home with family  Discharge Plan B: Home with family      [facility] [id number] - [place], [place] - [address]  [address] [place] [address]  Phone: [phone] Fax: [phone]      Initial Assessment (most recent)       Adult Discharge Assessment - 01/23/24 1312          Discharge Assessment    Assessment Type Discharge Planning Assessment     Confirmed/corrected address, phone number and insurance Yes     Confirmed Demographics Correct on Facesheet     Source of Information patient     Does patient/caregiver understand observation status  Yes     Communicated ERI with patient/caregiver Yes     Reason For Admission Complication of vascular dialysis catheter     People in Home spouse     Facility Arrived From: Home     Do you expect to return to your current living situation? Yes     Do you have help at home or someone to help you manage your care at home? Yes     Who are your caregiver(s) and their phone number(s)? Donovan García (Spouse)  [phone] (Mobile)     Prior to hospitilization cognitive status: Alert/Oriented     Current cognitive status: Alert/Oriented     Walking or Climbing Stairs Difficulty yes     Walking or Climbing Stairs ambulation difficulty, requires equipment;transferring difficulty, requires equipment;stair climbing difficulty, requires equipment     Mobility Management Rollator, wheelchair     Dressing/Bathing Difficulty no     Home Accessibility wheelchair accessible     Home Layout Able to live on 1st floor     Equipment Currently Used at Home walker, rolling;wheelchair;glucometer     Readmission within 30 days? No     Patient currently being followed by outpatient case management? No     Do you currently have service(s) that help you manage your care at home? No     Do you take prescription medications? Yes     Do you have prescription coverage? Yes     Coverage Payor:   HUMANA MANAGED MEDICARE - [employer] MEDICARE HMO - CAPITATED     Do you have any problems affording any of your prescribed medications? No     Is the patient taking medications as prescribed? yes     Who is going to help you get home at discharge? Donovan García (Spouse)  [phone] (Mobile)     How do you get to doctors appointments? family or friend will provide     Are you on dialysis? Yes     Dialysis Name and Scheduled days [facility] MWF     Do you take coumadin? No     Discharge Plan A Home with family     Discharge Plan B Home with family     DME Needed Upon Discharge  none     Discharge Plan discussed with: Patient     Transition  of Care Barriers None        OTHER    Name(s) of People in Home Donovan García (Spouse)  [phone] (Mobile)

## 2024-01-23 NOTE — CONSULTS
58F former patient of Dr. Carlson who moved to [place] is getting admitted from Dialysis center in [place] with chest pain. She started experiencing pain on R side of chest near the dialysis catheter area when HD was started. Severe chest pain followed by nausea with one episode of vomiting. When HD was stopped her chest pain mostly went away. Pt has AV fistula on E and she was supposed to have a revision surgery in Feb 2024 because fistula is not working .  Patient has been afebrile.  White count today is 7.7.  No obvious signs of infection.  She has a left upper arm AV fistula which was revised late last year and was scheduled for a fistulogram as an outpatient in the near future.    Currently she is asymptomatic  Chest x-ray reviewed and the catheter is in good position.  Catheter is present in the right chest wall without signs of infection, no erythema.    Patient is going for dialysis through the catheter later today.      Impression  Nausea vomiting and chest pain during dialysis  Catheter looks to be in good position without signs of infection does not appear to be the culprit but will we to see how she does with dialysis today.    Plan  Discussed with Dr. Khoobehi  and follow-up today after dialysis.

## 2024-01-23 NOTE — SUBJECTIVE & OBJECTIVE
Past Medical History:   Diagnosis Date    A-fib     resolved per patient    Anemia due to end stage renal disease 6/30/2022    Arthritis     Bronchitis     Cardiovascular event risk, ASCVD 10-year risk 6.7% 10/15/2016    Diabetes mellitus type II     Diabetic foot infection     Diabetic ulcer of left midfoot 05/05/2022    Disorder of kidney and ureter     Encounter for blood transfusion     ESRD (end stage renal disease) 05/18/2018    MANJINDER (generalized anxiety disorder) 10/25/2016    History of colon polyps 11/02/2016    Hyperlipidemia     Hypertension     Stroke        Past Surgical History:   Procedure Laterality Date    ANGIOGRAPHY OF LOWER EXTREMITY Left 10/18/2022    Procedure: Angiogram Extremity Unilateral;  Surgeon: Ali Khoobehi, MD;  Location: [facility] CATH/EP LAB;  Service: Vascular;  Laterality: Left;    AV FISTULA PLACEMENT Left 8/29/2022    Procedure: CREATION, AV FISTULA;  Surgeon: Ali Khoobehi, MD;  Location: [facility] OR;  Service: Vascular;  Laterality: Left;    BONE BIOPSY Left 8/24/2022    Procedure: Biopsy-Bone;  Surgeon: Porfirio Ponce DPM;  Location: [facility] OR;  Service: Podiatry;  Laterality: Left;    COLONOSCOPY N/A 10/5/2016    Procedure: COLONOSCOPY;  Surgeon: Pillo Chanel MD;  Location: [facility] ENDO;  Service: Endoscopy;  Laterality: N/A;    COLONOSCOPY N/A 4/26/2022    Procedure: COLONOSCOPY;  Surgeon: Saravanan Rachel MD;  Location: [facility] ENDO;  Service: Endoscopy;  Laterality: N/A;    FISTULOGRAM Left 8/24/2022    Procedure: Fistulogram;  Surgeon: Ali Khoobehi, MD;  Location: [facility] CATH/EP LAB;  Service: Vascular;  Laterality: Left;    HERNIA REPAIR Bilateral 11/22/2016    inguinal    HYSTERECTOMY      INCISION AND DRAINAGE FOOT Left 5/13/2022    Procedure: INCISION AND DRAINAGE, FOOT;  Surgeon: Ricardo Bruno DPM;  Location: [facility] OR;  Service: Podiatry;  Laterality: Left;    OOPHORECTOMY      THROMBECTOMY, AV FISTULA, UPPER EXTREMITY, PERCUTANEOUS  8/24/2022    Procedure: THROMBECTOMY, AV  FISTULA, UPPER EXTREMITY, PERCUTANEOUS;  Surgeon: Ali Khoobehi, MD;  Location: [facility] CATH/EP LAB;  Service: Vascular;;    TOE AMPUTATION Left 8/26/2022    Procedure: AMPUTATION, TOE;  Surgeon: Porfirio Ponce DPM;  Location: [facility] OR;  Service: Podiatry;  Laterality: Left;       Review of patient's allergies indicates:   Allergen Reactions    Dilaudid [hydromorphone] Nausea And Vomiting    Chlorhexidine Itching    Lortab [hydrocodone-acetaminophen] Itching       No current facility-administered medications on file prior to encounter.     Current Outpatient Medications on File Prior to Encounter   Medication Sig    amLODIPine (NORVASC) 5 MG tablet Take 1 tablet by mouth once daily.    atorvastatin (LIPITOR) 80 MG tablet Take 1 tablet (80 mg total) by mouth once daily.    cinacalcet (SENSIPAR) 60 MG Tab Take 2 tablets by mouth 2 (two) times daily with meals.    clopidogreL (PLAVIX) 75 mg tablet Take 1 tablet (75 mg total) by mouth once daily.    doxycycline (VIBRAMYCIN) 100 MG Cap Take 1 capsule (100 mg total) by mouth 2 (two) times daily. for 14 days    hydrALAZINE (APRESOLINE) 25 MG tablet Take 1 tablet (25 mg total) by mouth 2 (two) times daily as needed (Systolic blood pressure > 170 mm Hg).    minoxidiL (LONITEN) 2.5 MG tablet Take 2 tablets by mouth 2 (two) times daily.    multivitamin (THERAGRAN) per tablet Take 1 tablet by mouth once daily.    OZEMPIC 0.25 mg or 0.5 mg (2 mg/3 mL) pen injector INJECT 0.25 MG SUBCUTANEOUSLY ONCE A WEEK (Patient taking differently: Inject 0.25 mg into the skin every 7 days. INJECT 0.25 MG SUBCUTANEOUSLY ONCE A WEEK)    sucroferric oxyhydroxide (VELPHORO) 500 mg Chew Take 2 tablets by mouth 3 (three) times daily.    acetaminophen (TYLENOL) 325 MG tablet Take 325 mg by mouth every 6 (six) hours.    ALPRAZolam (XANAX) 0.5 MG tablet Take 1 tablet by mouth.    blood sugar diagnostic Strp To check BG 4 times daily, to use with insurance preferred meter    blood sugar diagnostic Strp  "To check BG 1 times daily, to use with insurance preferred meter    blood sugar diagnostic Strp To check BG 3 times daily, to use with insurance preferred meter    blood-glucose meter kit To check BG 3 times daily, to use with insurance preferred meter    cetirizine (ZYRTEC) 10 MG tablet Take 1 tablet (10 mg total) by mouth every other day.    doxycycline (VIBRAMYCIN) 100 MG Cap Take 1 capsule (100 mg total) by mouth 2 (two) times daily. for 14 days    epoetin chris-epbx (RETACRIT) 4,000 unit/mL injection Inject 1.11 mLs (4,440 Units total) into the skin every Mon, Wed, Fri.    gabapentin (NEURONTIN) 100 MG capsule Take 1 capsule (100 mg total) by mouth 2 (two) times daily.    lancets Misc To check BG 1 times daily, to use with insurance preferred meter    lancets Misc To check BG 3 times daily, to use with insurance preferred meter    pen needle, diabetic (BD ULTRA-FINE ROBERT PEN NEEDLE) 32 gauge x 5/32" Ndle 1 pen by Misc.(Non-Drug; Combo Route) route 4 (four) times daily. To use 4 times per day with insulin injections.    [DISCONTINUED] azelastine (ASTELIN) 137 mcg (0.1 %) nasal spray 1 spray (137 mcg total) by Nasal route 2 (two) times daily.    [DISCONTINUED] clorazepate (TRANXENE) 3.75 MG Tab Take 7.5 mg by mouth nightly as needed.     [DISCONTINUED] dextrose (GLUCOSE GEL) 40 % gel Take 37.5 mLs (15,000 mg total) by mouth once as needed (hypoglycemia).    [DISCONTINUED] doxycycline (MONODOX) 100 MG capsule Take 1 capsule (100 mg total) by mouth 2 (two) times daily.    [DISCONTINUED] EScitalopram oxalate (LEXAPRO) 10 MG tablet Take 1 tablet (10 mg total) by mouth once daily. (Patient not taking: Reported on 10/3/2023)    [DISCONTINUED] ezetimibe (ZETIA) 10 mg tablet Take 1 tablet (10 mg total) by mouth once daily.    [DISCONTINUED] fenofibrate 160 MG Tab Take 1 tablet (160 mg total) by mouth once daily.    [DISCONTINUED] fluticasone propionate (FLONASE) 50 mcg/actuation nasal spray 2 sprays (100 mcg total) by " Each Nostril route once daily.    [DISCONTINUED] lancing device with lancets (ACCU-CHEK SOFT DEV LANCETS) Kit To check blood sugars 4 times per day    [DISCONTINUED] minoxidiL (LONITEN) 10 MG Tab Take 10 mg by mouth 2 (two) times daily.    [DISCONTINUED] pantoprazole (PROTONIX) 40 MG tablet Take 1 tablet (40 mg total) by mouth once daily.     Family History       Problem Relation (Age of Onset)    Cancer Paternal Grandmother    Diabetes Father, Sister, Sister, Maternal Aunt, Maternal Grandmother    Heart disease Maternal Grandmother    Hyperlipidemia Mother    Hypertension Mother, Father          Tobacco Use    Smoking status: Never    Smokeless tobacco: Never   Substance and Sexual Activity    Alcohol use: No    Drug use: No    Sexual activity: Yes     Partners: Male     Review of Systems   Constitutional:  Negative for activity change and appetite change.   HENT:  Negative for congestion and dental problem.    Eyes:  Negative for discharge and itching.   Respiratory:  Negative for shortness of breath.    Cardiovascular:  Positive for chest pain.   Gastrointestinal:  Negative for abdominal distention and abdominal pain.   Endocrine: Negative for cold intolerance.   Genitourinary:  Negative for difficulty urinating and dysuria.   Musculoskeletal:  Negative for arthralgias and back pain.   Skin:  Negative for color change.   Neurological:  Negative for dizziness and facial asymmetry.   Hematological:  Negative for adenopathy.   Psychiatric/Behavioral:  Negative for agitation and behavioral problems.      Objective:     Vital Signs (Most Recent):  Temp: 98.8 °F (37.1 °C) (01/22/24 1953)  Pulse: 90 (01/22/24 1953)  Resp: 20 (01/22/24 1953)  BP: (!) 150/77 (01/22/24 1953)  SpO2: 99 % (01/22/24 1953) Vital Signs (24h Range):  Temp:  [98 °F (36.7 °C)-98.8 °F (37.1 °C)] 98.8 °F (37.1 °C)  Pulse:  [79-90] 90  Resp:  [16-20] 20  SpO2:  [98 %-99 %] 99 %  BP: (150-203)/(76-85) 150/77     Weight: 80.1 kg (176 lb 9.4 oz)  Body  mass index is 27.66 kg/m².     Physical Exam  Vitals and nursing note reviewed.   Constitutional:       General: She is not in acute distress.  HENT:      Head: Atraumatic.      Right Ear: External ear normal.      Left Ear: External ear normal.      Nose: Nose normal.      Mouth/Throat:      Mouth: Mucous membranes are moist.   Eyes:      Extraocular Movements: Extraocular movements intact.   Cardiovascular:      Rate and Rhythm: Normal rate.   Pulmonary:      Effort: Pulmonary effort is normal.      Comments: HD catheter insitu on R side  Abdominal:      Palpations: Abdomen is soft.   Musculoskeletal:         General: Normal range of motion.      Cervical back: Normal range of motion.   Skin:     General: Skin is warm.   Neurological:      Mental Status: She is alert and oriented to person, place, and time.   Psychiatric:         Behavior: Behavior normal.                Significant Labs: All pertinent labs within the past 24 hours have been reviewed.  CBC:   Recent Labs   Lab 01/22/24  1349   WBC 10.96   HGB 12.8   HCT 39.9        CMP:   Recent Labs   Lab 01/22/24  1349      K 5.3*      CO2 25   *   BUN 74*   CREATININE 7.1*   CALCIUM 9.8   PROT 7.2   ALBUMIN 4.1   BILITOT 0.4   ALKPHOS 88   AST 14   ALT 12   ANIONGAP 13       Significant Imaging: I have reviewed all pertinent imaging results/findings within the past 24 hours.

## 2024-01-23 NOTE — PT/OT/SLP EVAL
Physical Therapy Evaluation    Patient Name:  Leanna García   MRN:  [id number]    Recommendations:     Discharge Recommendations: No Therapy Indicated   Discharge Equipment Recommendations:     Barriers to discharge: None    Assessment:     Leanna García is a 58 y.o. female admitted with a medical diagnosis of Complication of vascular dialysis catheter.  She presents with the following impairments/functional limitations: impaired functional mobility, gait instability, impaired balance, decreased lower extremity function, decreased safety awareness, pain, edema, orthopedic precautions.    Pt found HOB elevated with  in room and agreeable to PT eval. Pt A & O x  3 and has the following co-morbidities: HTN, DM, A-Fib, PAD.  Pt tolerated session fairly and required CGA for safe mobilization during session today to maintain minimal to no weight bearing on L LE due to wound. Pt would benefit from acute PT during hospitalization to increase strength, endurance and safety with mobility, but will likely not benefit from follow up PT upon discharge home.      Rehab Prognosis: Fair and Poor; patient would benefit from acute skilled PT services to address these deficits and reach maximum level of function.    Recent Surgery: * No surgery found *      Plan:     During this hospitalization, patient to be seen 3 x/week to address the identified rehab impairments via therapeutic activities, therapeutic exercises, wheelchair management/training, gait training and progress toward the following goals:    Plan of Care Expires:  02/23/24    Subjective     Chief Complaint: Pt reported not being so concerned about her speech.  Patient/Family Comments/goals: home with  to assist on discharge  Pain/Comfort:  Pain Rating 1:  (not rated)  Location - Side 1: Left  Location 1: foot  Pain Addressed 1: Reposition, Distraction, Cessation of Activity, Nurse notified    Patients cultural, spiritual, Faith conflicts given the  current situation:      Living Environment:  Pt lives with her  in a [facility].  Prior to admission, patients level of function was MI with mobility for very short distances due to L foot wound, using wheelchair most of the time in house and community.  Equipment used at home: rollator.  DME owned (not currently used): none.  Upon discharge, patient will have assistance from her .    Objective:     Communicated with ABI Gutierrez prior to and after session.  Patient found HOB elevated with bed alarm, peripheral IV, telemetry  upon PT entry to room.    General Precautions: Standard, fall, diabetic  Orthopedic Precautions:RLE partial weight bearing   Braces: N/A  Respiratory Status: Room air    Exams:  Cognitive Exam:  Patient is oriented to Person, Place, and Situation  RLE ROM: WFL  LLE ROM: WFL w/ forefoot amputation.    Functional Mobility:  N/A (pt did not want to stress her foot any more than necessary today. Pt hoping Dr. Bruno will see her while admitted.)      AM-PAC 6 CLICK MOBILITY  Total Score:20       Treatment & Education:  Pt was educated on the following: call light use, importance of OOB activity and functional mobility to negate the negative effects of prolonged bed rest during this hospitalization, safe transfers/ambulation and discharge planning recommendations/options.  PT recommended pt use BSC next to bed for toileting while admitted instead of walking into bathroom. RN informed.       Patient left HOB elevated with all lines intact, call button in reach, bed alarm on, RN notified, and pt's  present.    GOALS:   Multidisciplinary Problems       Physical Therapy Goals          Problem: Physical Therapy    Goal Priority Disciplines Outcome Goal Variances Interventions   Physical Therapy Goal     PT, PT/OT      Description: Goals to be met by: 24     Patient will increase functional independence with mobility by performin. Supine to sit with Supervision  2. Sit to stand  transfer with Supervision  3. Bed to chair transfer with Supervision using Rolling Walker  4. Gait  x 15 feet with Supervision using Rolling Walker.                              History:     Past Medical History:   Diagnosis Date    A-fib     resolved per patient    Anemia due to end stage renal disease 6/30/2022    Arthritis     Bronchitis     Cardiovascular event risk, ASCVD 10-year risk 6.7% 10/15/2016    Diabetes mellitus type II     Diabetic foot infection     Diabetic ulcer of left midfoot 05/05/2022    Disorder of kidney and ureter     Encounter for blood transfusion     ESRD (end stage renal disease) 05/18/2018    MANJINDER (generalized anxiety disorder) 10/25/2016    History of colon polyps 11/02/2016    Hyperlipidemia     Hypertension     Stroke        Past Surgical History:   Procedure Laterality Date    ANGIOGRAPHY OF LOWER EXTREMITY Left 10/18/2022    Procedure: Angiogram Extremity Unilateral;  Surgeon: Ali Khoobehi, MD;  Location: [facility] CATH/EP LAB;  Service: Vascular;  Laterality: Left;    AV FISTULA PLACEMENT Left 8/29/2022    Procedure: CREATION, AV FISTULA;  Surgeon: Ali Khoobehi, MD;  Location: [facility] OR;  Service: Vascular;  Laterality: Left;    BONE BIOPSY Left 8/24/2022    Procedure: Biopsy-Bone;  Surgeon: Porfirio Ponce DPM;  Location: [facility] OR;  Service: Podiatry;  Laterality: Left;    COLONOSCOPY N/A 10/5/2016    Procedure: COLONOSCOPY;  Surgeon: Pillo Chanel MD;  Location: [facility] ENDO;  Service: Endoscopy;  Laterality: N/A;    COLONOSCOPY N/A 4/26/2022    Procedure: COLONOSCOPY;  Surgeon: Saravanan Rachel MD;  Location: [facility] ENDO;  Service: Endoscopy;  Laterality: N/A;    FISTULOGRAM Left 8/24/2022    Procedure: Fistulogram;  Surgeon: Ali Khoobehi, MD;  Location: [facility] CATH/EP LAB;  Service: Vascular;  Laterality: Left;    HERNIA REPAIR Bilateral 11/22/2016    inguinal    HYSTERECTOMY      INCISION AND DRAINAGE FOOT Left 5/13/2022    Procedure: INCISION AND DRAINAGE, FOOT;  Surgeon: Ricardo  MARTINEZ Bruno;  Location: [facility] OR;  Service: Podiatry;  Laterality: Left;    OOPHORECTOMY      THROMBECTOMY, AV FISTULA, UPPER EXTREMITY, PERCUTANEOUS  8/24/2022    Procedure: THROMBECTOMY, AV FISTULA, UPPER EXTREMITY, PERCUTANEOUS;  Surgeon: Ali Khoobehi, MD;  Location: [facility] CATH/EP LAB;  Service: Vascular;;    TOE AMPUTATION Left 8/26/2022    Procedure: AMPUTATION, TOE;  Surgeon: Porfirio Ponce DPM;  Location: [facility] OR;  Service: Podiatry;  Laterality: Left;       Time Tracking:     PT Received On: 01/23/24  PT Start Time: 0919     PT Stop Time: 0932  PT Total Time (min): 13 min     Billable Minutes: Evaluation 13      01/23/2024

## 2024-01-23 NOTE — PLAN OF CARE
Pt was explained ERICKSON. Pt verbalized understanding of ERICKSON and signed. ERICKSON scanned to .       01/23/24 1144   ERICKSON Message   Medicare Outpatient and Observation Notification regarding financial responsibility Given to patient/caregiver;Explained to patient/caregiver;Signed/date by patient/caregiver   Date ERICKSON was signed 01/23/24   Time ERICKSON was signed 1142

## 2024-01-23 NOTE — CARE UPDATE
Attempt made to reach patient to schedule her an appt to see KYLAH Stout.  Patient last seen in clinic by KYLAH Lemon.  She needs to be treated for hep c.  LVM asking that she give us a call back.   Code stroke called as nurse reports patient has slurring of speech.  On arrival, patient AO x3, neuro deficits noted muscle strength 5/5, no facial droop or drooling noted.  Patient is able to answer questions appropriately, blood pressure with systolic in the 160  Patient received Benadryl but nurse states that patient was slurring prior to Benadryl.  Stat brain CT ordered, stroke protocol activated, patient to be transferred to telemetry for tele neuro evaluation.  Aspirin 325 given, continue on aspirin 81 daily.  Would not treat blood pressure, keep elevated for permissive hypertension until stroke is ruled out.    Update :  Brain CT negative for any acute intracranial abnormality

## 2024-01-23 NOTE — H&P
[facility] - Emergency Dept  Hospital Medicine  History & Physical    Patient Name: Leanna García  MRN: [id number]  Patient Class: OP- Observation  Admission Date: 1/22/2024  Attending Physician: Usman Stacy MD   Primary Care Provider: Stefano Lopez MD         Patient information was obtained from patient, past medical records, and ER records.     Subjective:     Principal Problem:Complication of vascular dialysis catheter    Chief Complaint:   Chief Complaint   Patient presents with    Vascular Access Problem     Patient arrives via EMS from Dialysis for pain at her port site. States she started experiencing pain at her site and nausea during treatment. Pain and nausea has since subsided since treatment stopped.         HPI: 58 year old pt getting admitted from Dialysis center in [place] with chest pain  Pt was supposed to have HD today  She started experiencing pain on R side of chest near the Dialysis catheter area when HD was started  Pt experienced severe chest pain followed by nausea with one episode of vomiting  When Hd was stopped her chest pain mostly went away  Pt has AV fistula on LUE and she was supposed to have a revision surgery on Feb 2024 because fistula is not working  Denies any other c/o or issues  Pts Nephrologist is Dr Madie Ta MD  Pt established care with Dr STEVE Adame on October 2023        Past Medical History:   Diagnosis Date    A-fib     resolved per patient    Anemia due to end stage renal disease 6/30/2022    Arthritis     Bronchitis     Cardiovascular event risk, ASCVD 10-year risk 6.7% 10/15/2016    Diabetes mellitus type II     Diabetic foot infection     Diabetic ulcer of left midfoot 05/05/2022    Disorder of kidney and ureter     Encounter for blood transfusion     ESRD (end stage renal disease) 05/18/2018    MANJINDER (generalized anxiety disorder) 10/25/2016    History of colon polyps 11/02/2016    Hyperlipidemia     Hypertension     Stroke        Past  Surgical History:   Procedure Laterality Date    ANGIOGRAPHY OF LOWER EXTREMITY Left 10/18/2022    Procedure: Angiogram Extremity Unilateral;  Surgeon: Ali Khoobehi, MD;  Location: [facility] CATH/EP LAB;  Service: Vascular;  Laterality: Left;    AV FISTULA PLACEMENT Left 8/29/2022    Procedure: CREATION, AV FISTULA;  Surgeon: Ali Khoobehi, MD;  Location: [facility] OR;  Service: Vascular;  Laterality: Left;    BONE BIOPSY Left 8/24/2022    Procedure: Biopsy-Bone;  Surgeon: Porfirio Ponce DPM;  Location: [facility] OR;  Service: Podiatry;  Laterality: Left;    COLONOSCOPY N/A 10/5/2016    Procedure: COLONOSCOPY;  Surgeon: Pillo Chanel MD;  Location: [facility] ENDO;  Service: Endoscopy;  Laterality: N/A;    COLONOSCOPY N/A 4/26/2022    Procedure: COLONOSCOPY;  Surgeon: Saravanan Rachel MD;  Location: [facility] ENDO;  Service: Endoscopy;  Laterality: N/A;    FISTULOGRAM Left 8/24/2022    Procedure: Fistulogram;  Surgeon: Ali Khoobehi, MD;  Location: [facility] CATH/EP LAB;  Service: Vascular;  Laterality: Left;    HERNIA REPAIR Bilateral 11/22/2016    inguinal    HYSTERECTOMY      INCISION AND DRAINAGE FOOT Left 5/13/2022    Procedure: INCISION AND DRAINAGE, FOOT;  Surgeon: Ricardo Bruno DPM;  Location: [facility];  Service: Podiatry;  Laterality: Left;    OOPHORECTOMY      THROMBECTOMY, AV FISTULA, UPPER EXTREMITY, PERCUTANEOUS  8/24/2022    Procedure: THROMBECTOMY, AV FISTULA, UPPER EXTREMITY, PERCUTANEOUS;  Surgeon: Ali Khoobehi, MD;  Location: [facility] CATH/EP LAB;  Service: Vascular;;    TOE AMPUTATION Left 8/26/2022    Procedure: AMPUTATION, TOE;  Surgeon: Porfirio Ponce DPM;  Location: [facility] OR;  Service: Podiatry;  Laterality: Left;       Review of patient's allergies indicates:   Allergen Reactions    Dilaudid [hydromorphone] Nausea And Vomiting    Chlorhexidine Itching    Lortab [hydrocodone-acetaminophen] Itching       No current facility-administered medications on file prior to encounter.     Current Outpatient Medications on File  Prior to Encounter   Medication Sig    amLODIPine (NORVASC) 5 MG tablet Take 1 tablet by mouth once daily.    atorvastatin (LIPITOR) 80 MG tablet Take 1 tablet (80 mg total) by mouth once daily.    cinacalcet (SENSIPAR) 60 MG Tab Take 2 tablets by mouth 2 (two) times daily with meals.    clopidogreL (PLAVIX) 75 mg tablet Take 1 tablet (75 mg total) by mouth once daily.    doxycycline (VIBRAMYCIN) 100 MG Cap Take 1 capsule (100 mg total) by mouth 2 (two) times daily. for 14 days    hydrALAZINE (APRESOLINE) 25 MG tablet Take 1 tablet (25 mg total) by mouth 2 (two) times daily as needed (Systolic blood pressure > 170 mm Hg).    minoxidiL (LONITEN) 2.5 MG tablet Take 2 tablets by mouth 2 (two) times daily.    multivitamin (THERAGRAN) per tablet Take 1 tablet by mouth once daily.    OZEMPIC 0.25 mg or 0.5 mg (2 mg/3 mL) pen injector INJECT 0.25 MG SUBCUTANEOUSLY ONCE A WEEK (Patient taking differently: Inject 0.25 mg into the skin every 7 days. INJECT 0.25 MG SUBCUTANEOUSLY ONCE A WEEK)    sucroferric oxyhydroxide (VELPHORO) 500 mg Chew Take 2 tablets by mouth 3 (three) times daily.    acetaminophen (TYLENOL) 325 MG tablet Take 325 mg by mouth every 6 (six) hours.    ALPRAZolam (XANAX) 0.5 MG tablet Take 1 tablet by mouth.    blood sugar diagnostic Strp To check BG 4 times daily, to use with insurance preferred meter    blood sugar diagnostic Strp To check BG 1 times daily, to use with insurance preferred meter    blood sugar diagnostic Strp To check BG 3 times daily, to use with insurance preferred meter    blood-glucose meter kit To check BG 3 times daily, to use with insurance preferred meter    cetirizine (ZYRTEC) 10 MG tablet Take 1 tablet (10 mg total) by mouth every other day.    doxycycline (VIBRAMYCIN) 100 MG Cap Take 1 capsule (100 mg total) by mouth 2 (two) times daily. for 14 days    epoetin chris-epbx (RETACRIT) 4,000 unit/mL injection Inject 1.11 mLs (4,440 Units total) into the skin every Mon, Wed, Fri.     "gabapentin (NEURONTIN) 100 MG capsule Take 1 capsule (100 mg total) by mouth 2 (two) times daily.    lancets Misc To check BG 1 times daily, to use with insurance preferred meter    lancets Misc To check BG 3 times daily, to use with insurance preferred meter    pen needle, diabetic (BD ULTRA-FINE ROBERT PEN NEEDLE) 32 gauge x 5/32" Ndle 1 pen by Misc.(Non-Drug; Combo Route) route 4 (four) times daily. To use 4 times per day with insulin injections.    [DISCONTINUED] azelastine (ASTELIN) 137 mcg (0.1 %) nasal spray 1 spray (137 mcg total) by Nasal route 2 (two) times daily.    [DISCONTINUED] clorazepate (TRANXENE) 3.75 MG Tab Take 7.5 mg by mouth nightly as needed.     [DISCONTINUED] dextrose (GLUCOSE GEL) 40 % gel Take 37.5 mLs (15,000 mg total) by mouth once as needed (hypoglycemia).    [DISCONTINUED] doxycycline (MONODOX) 100 MG capsule Take 1 capsule (100 mg total) by mouth 2 (two) times daily.    [DISCONTINUED] EScitalopram oxalate (LEXAPRO) 10 MG tablet Take 1 tablet (10 mg total) by mouth once daily. (Patient not taking: Reported on 10/3/2023)    [DISCONTINUED] ezetimibe (ZETIA) 10 mg tablet Take 1 tablet (10 mg total) by mouth once daily.    [DISCONTINUED] fenofibrate 160 MG Tab Take 1 tablet (160 mg total) by mouth once daily.    [DISCONTINUED] fluticasone propionate (FLONASE) 50 mcg/actuation nasal spray 2 sprays (100 mcg total) by Each Nostril route once daily.    [DISCONTINUED] lancing device with lancets (ACCU-CHEK SOFT DEV LANCETS) Kit To check blood sugars 4 times per day    [DISCONTINUED] minoxidiL (LONITEN) 10 MG Tab Take 10 mg by mouth 2 (two) times daily.    [DISCONTINUED] pantoprazole (PROTONIX) 40 MG tablet Take 1 tablet (40 mg total) by mouth once daily.     Family History       Problem Relation (Age of Onset)    Cancer Paternal Grandmother    Diabetes Father, Sister, Sister, Maternal Aunt, Maternal Grandmother    Heart disease Maternal Grandmother    Hyperlipidemia Mother    Hypertension Mother, " Father          Tobacco Use    Smoking status: Never    Smokeless tobacco: Never   Substance and Sexual Activity    Alcohol use: No    Drug use: No    Sexual activity: Yes     Partners: Male     Review of Systems   Constitutional:  Negative for activity change and appetite change.   HENT:  Negative for congestion and dental problem.    Eyes:  Negative for discharge and itching.   Respiratory:  Negative for shortness of breath.    Cardiovascular:  Positive for chest pain.   Gastrointestinal:  Negative for abdominal distention and abdominal pain.   Endocrine: Negative for cold intolerance.   Genitourinary:  Negative for difficulty urinating and dysuria.   Musculoskeletal:  Negative for arthralgias and back pain.   Skin:  Negative for color change.   Neurological:  Negative for dizziness and facial asymmetry.   Hematological:  Negative for adenopathy.   Psychiatric/Behavioral:  Negative for agitation and behavioral problems.      Objective:     Vital Signs (Most Recent):  Temp: 98.8 °F (37.1 °C) (01/22/24 1953)  Pulse: 90 (01/22/24 1953)  Resp: 20 (01/22/24 1953)  BP: (!) 150/77 (01/22/24 1953)  SpO2: 99 % (01/22/24 1953) Vital Signs (24h Range):  Temp:  [98 °F (36.7 °C)-98.8 °F (37.1 °C)] 98.8 °F (37.1 °C)  Pulse:  [79-90] 90  Resp:  [16-20] 20  SpO2:  [98 %-99 %] 99 %  BP: (150-203)/(76-85) 150/77     Weight: 80.1 kg (176 lb 9.4 oz)  Body mass index is 27.66 kg/m².     Physical Exam  Vitals and nursing note reviewed.   Constitutional:       General: She is not in acute distress.  HENT:      Head: Atraumatic.      Right Ear: External ear normal.      Left Ear: External ear normal.      Nose: Nose normal.      Mouth/Throat:      Mouth: Mucous membranes are moist.   Eyes:      Extraocular Movements: Extraocular movements intact.   Cardiovascular:      Rate and Rhythm: Normal rate.   Pulmonary:      Effort: Pulmonary effort is normal.      Comments: HD catheter insitu on R side  Abdominal:      Palpations: Abdomen is  soft.   Musculoskeletal:         General: Normal range of motion.      Cervical back: Normal range of motion.   Skin:     General: Skin is warm.   Neurological:      Mental Status: She is alert and oriented to person, place, and time.   Psychiatric:         Behavior: Behavior normal.                Significant Labs: All pertinent labs within the past 24 hours have been reviewed.  CBC:   Recent Labs   Lab 01/22/24  1349   WBC 10.96   HGB 12.8   HCT 39.9        CMP:   Recent Labs   Lab 01/22/24  1349      K 5.3*      CO2 25   *   BUN 74*   CREATININE 7.1*   CALCIUM 9.8   PROT 7.2   ALBUMIN 4.1   BILITOT 0.4   ALKPHOS 88   AST 14   ALT 12   ANIONGAP 13       Significant Imaging: I have reviewed all pertinent imaging results/findings within the past 24 hours.    Assessment/Plan:     * Complication of vascular dialysis catheter  Consult vascular surgeon       Chest pain  Seems atypical associated with HD catheter  Serial cardiac enzymes  Tele monitoring   Presently chest pain free  Pt had abnormal stress test which was done in 2022        PAD (peripheral artery disease)  Aware  Maintain plavix      Type 2 diabetes mellitus with kidney complication, with long-term current use of insulin  Maintain SSI   Latest Reference Range & Units 05/29/22 13:24 06/22/22 06:18 10/17/22 18:01 06/06/23 13:47   Hemoglobin A1C External 4.0 - 5.6 % 8.3 (H) 7.5 (H) 7.2 (H) 8.3 (H)         ESRD (end stage renal disease)  Creatine stable for now. BMP reviewed- noted Estimated Creatinine Clearance: 9.4 mL/min (A) (based on SCr of 7.1 mg/dL (H)). according to latest data. Based on current GFR, CKD stage is end stage.  Monitor UOP and serial BMP and adjust therapy as needed. Renally dose meds. Avoid nephrotoxic medications and procedures.    MANJINDER (generalized anxiety disorder)  Aware       Chronic diastolic heart failure  Chronic   Stable       History of atrial fibrillation, 2015  Stable       Hypertension associated with  diabetes  Maintain present regime        VTE Risk Mitigation (From admission, onward)           Ordered     heparin (porcine) injection 5,000 Units  3 times daily         01/22/24 2109     IP VTE HIGH RISK PATIENT  Once         01/22/24 1611     Place sequential compression device  Until discontinued         01/22/24 1611                       On 01/22/2024, patient should be placed in hospital observation services under my care.        The enoxaparin 30 mg q24h was changed to Heparin 5000 units SQ q8h, as per recommendation of the enoxaparin manufactuer, and as approved per Dr. Regis Stacy MD  Department of Hospital Medicine  [facility] - Emergency Dept

## 2024-01-23 NOTE — PROGRESS NOTES
Attempted to see patient today but patient was off unit in dialysis. Per chart review, she reports CP and nausea and vomiting during HD session prior to arrival.  HD catheter seems to be working appropriately and not the culprit.  Troponins are mildly elevated and downtrending. No acute ST-T wave changes on EKG.  ECHO shows normal wall motion, EF 60-65% and normal systolic and diastolic function.  Patient had abnormal stress test April 2022 that showed small area of reversible ischemia but was also limited due to soft tissue attenuation and significant movement.  Recommend repeating nuclear stress test next am for further evaluation of reversible ischemia.  Thank you for the consult.  We will see patient tomorrow.     Yanet Reid NP  Cardiology  1/23/24    As above attempted to see the patient on successful as patient was in dialysis unit  Will re-evaluate the patient in the morning  Consider repeating the myocardial perfusion imaging study to evaluate for any significant ischemia  And further recommendations based on evaluation and the testing

## 2024-01-23 NOTE — ASSESSMENT & PLAN NOTE
Maintain SSI   Latest Reference Range & Units 05/29/22 13:24 06/22/22 06:18 10/17/22 18:01 06/06/23 13:47   Hemoglobin A1C External 4.0 - 5.6 % 8.3 (H) 7.5 (H) 7.2 (H) 8.3 (H)

## 2024-01-23 NOTE — TELEMEDICINE CONSULT
[facility]  Vascular Neurology  Comprehensive Stroke Center  TeleVascular Neurology Interprofessional Consult Note           Consult Information  Consult to Telemedicine - Acute Stroke  Consult performed by: Stefano Aquino MD  Consult ordered by: Conchita Douglas MD          Consulting Provider: CONCHITA DOUGLAS   Patient Location:  [facility] [address]     Summary of patient's symptoms:  58F admitted for possible vascular access complication, chest pain/n/v during HD yesterday, today with Isolated slurred speech.  Upon calling to [facility], cart was in closet, no nurse or patient available to examine, no scan ordered or completed.   When attempting to connect with provider from team, per their request the plan is now to have them round and see the patient and then they will reconnect with me afterwards.     Imaging personally interpreted:  CT Brain From yesterday: no evidence of early or subacute ischemic changes, intracerebral hemorrhage or hyperdense vessel signs  CTA Head & Neck: Not performed at time of consultation       Assessment and plan:  Report of isolated slurred speech. Primary team recommending they call back after their rounds to discuss plan.     I spent approximately 15 minutes on this encounter. More than half of that time was spent communicating with the consulting provider and coordinating patient care.     Signature  Stefano Aquino MD        This encounter was conducted as an interprofessional communication between providers at the [facility] and vascular neurologist. The interaction was completed over the phone or via secure messaging (electronic medical record - [facility] Secure Chat).     Once this note was completed, a written copy was sent back to the provider via fax or electronic medical record.

## 2024-01-23 NOTE — PT/OT/SLP EVAL
Occupational Therapy   Evaluation    Name: Leanna García  MRN: [id number]  Admitting Diagnosis: Complication of vascular dialysis catheter  Recent Surgery: * No surgery found *      Recommendations:     Discharge Recommendations: No Therapy Indicated  Discharge Equipment Recommendations:  none  Barriers to discharge:  None    Assessment:     Leanna García is a 58 y.o. female with a medical diagnosis of Complication of vascular dialysis catheter.  Pt agreeable to OT evaluation this AM. Performance deficits affecting function: impaired endurance, impaired self care skills, impaired functional mobility, gait instability, impaired balance, decreased lower extremity function, pain, impaired skin, orthopedic precautions, impaired cardiopulmonary response to activity.      Rehab Prognosis: Good; patient would benefit from acute skilled OT services to address these deficits and reach maximum level of function.       Plan:     Patient to be seen 3 x/week to address the above listed problems via self-care/home management, therapeutic activities, therapeutic exercises  Plan of Care Expires: 02/23/24  Plan of Care Reviewed with: patient    Subjective     Chief Complaint: none stated  Patient/Family Comments/goals: none stated    Occupational Profile:  Living Environment: Pt lives with  in a mobile home with a ramp to enter. Pt has a walk-in shower   Previous level of function: Mod I with ADLs; Minimal walking due to limited WB'ing on LLE. Pt reports she has been taking sponge baths at sink due to inability to get foot wounds wet; uses RW in home and w/c in community  Roles and Routines: wife  Equipment Used at Home: wheelchair, rollator, walker, rolling  Assistance upon Discharge: yes, from     Pain/Comfort:  Pain Rating 1:  (not rated)  Location - Side 1: Left  Location 1: foot  Pain Addressed 1: Distraction, Nurse notified    Patients cultural, spiritual, Church conflicts given the current situation:       Objective:     Communicated with: nursing prior to session.  Patient found HOB elevated with bed alarm, peripheral IV, telemetry upon OT entry to room.    General Precautions: Standard, fall  Orthopedic Precautions: LLE partial weight bearing  Braces: N/A  Respiratory Status: Room air    Occupational Performance:    Activities of Daily Living:  Feeding:  independence    Grooming: setup assistance bed level      Cognitive/Visual Perceptual:  Cognitive/Psychosocial Skills:     -       Oriented to: Person, Place, Time, and Situation   -       Follows Commands/attention:Follows one-step commands  -       Communication: clear/fluent  -       Memory: No Deficits noted  -       Mood/Affect/Coping skills/emotional control: Appropriate to situation, Cooperative, and Pleasant    Physical Exam:  Upper Extremity Range of Motion:     -       Right Upper Extremity: WFL  -       Left Upper Extremity: WFL  Upper Extremity Strength:    -       Right Upper Extremity: WFL  -       Left Upper Extremity: WFL   Strength:    -       Right Upper Extremity: WFL  -       Left Upper Extremity: WFL  Fine Motor Coordination:    -       Intact  Gross motor coordination:   WFL    AMPAC 6 Click ADL:  AMPAC Total Score: 22    Treatment & Education:  Pt educated on role of OT/POC, importance of OOB/EOB activity, use of call bell, and safety during ADLs, transfers, and functional mobility.    Patient left HOB elevated with all lines intact, call button in reach, bed alarm on, nursing notified, and spouse present    GOALS:   Multidisciplinary Problems       Occupational Therapy Goals          Problem: Occupational Therapy    Goal Priority Disciplines Outcome Interventions   Occupational Therapy Goal     OT, PT/OT     Description: Goals to be met by: 2/23/24     Patient will increase functional independence with ADLs by performing:    UE Dressing with Set-up Assistance.  LE Dressing with Set-up Assistance and Assistive Devices as  needed.  Grooming while seated with Supervision.  Toileting from bedside commode with Minimal Assistance for hygiene and clothing management.   Toilet transfer to bedside commode with Stand-by Assistance.                         History:     Past Medical History:   Diagnosis Date    A-fib     resolved per patient    Anemia due to end stage renal disease 6/30/2022    Arthritis     Bronchitis     Cardiovascular event risk, ASCVD 10-year risk 6.7% 10/15/2016    Diabetes mellitus type II     Diabetic foot infection     Diabetic ulcer of left midfoot 05/05/2022    Disorder of kidney and ureter     Encounter for blood transfusion     ESRD (end stage renal disease) 05/18/2018    MANJINDER (generalized anxiety disorder) 10/25/2016    History of colon polyps 11/02/2016    Hyperlipidemia     Hypertension     Stroke          Past Surgical History:   Procedure Laterality Date    ANGIOGRAPHY OF LOWER EXTREMITY Left 10/18/2022    Procedure: Angiogram Extremity Unilateral;  Surgeon: Ali Khoobehi, MD;  Location: [facility] CATH/EP LAB;  Service: Vascular;  Laterality: Left;    AV FISTULA PLACEMENT Left 8/29/2022    Procedure: CREATION, AV FISTULA;  Surgeon: Ali Khoobehi, MD;  Location: [facility] OR;  Service: Vascular;  Laterality: Left;    BONE BIOPSY Left 8/24/2022    Procedure: Biopsy-Bone;  Surgeon: Porfirio Ponce DPM;  Location: [facility] OR;  Service: Podiatry;  Laterality: Left;    COLONOSCOPY N/A 10/5/2016    Procedure: COLONOSCOPY;  Surgeon: Pillo Chanel MD;  Location: [facility] ENDO;  Service: Endoscopy;  Laterality: N/A;    COLONOSCOPY N/A 4/26/2022    Procedure: COLONOSCOPY;  Surgeon: Saravanan Rachel MD;  Location: [facility] ENDO;  Service: Endoscopy;  Laterality: N/A;    FISTULOGRAM Left 8/24/2022    Procedure: Fistulogram;  Surgeon: Ali Khoobehi, MD;  Location: [facility] CATH/EP LAB;  Service: Vascular;  Laterality: Left;    HERNIA REPAIR Bilateral 11/22/2016    inguinal    HYSTERECTOMY      INCISION AND DRAINAGE FOOT Left 5/13/2022     Procedure: INCISION AND DRAINAGE, FOOT;  Surgeon: Riacrdo Bruno DPM;  Location: [facility] OR;  Service: Podiatry;  Laterality: Left;    OOPHORECTOMY      THROMBECTOMY, AV FISTULA, UPPER EXTREMITY, PERCUTANEOUS  8/24/2022    Procedure: THROMBECTOMY, AV FISTULA, UPPER EXTREMITY, PERCUTANEOUS;  Surgeon: Ali Khoobehi, MD;  Location: [facility] CATH/EP LAB;  Service: Vascular;;    TOE AMPUTATION Left 8/26/2022    Procedure: AMPUTATION, TOE;  Surgeon: Porfirio Ponce DPM;  Location: [facility] OR;  Service: Podiatry;  Laterality: Left;       Time Tracking:     OT Date of Treatment: 01/23/24  OT Start Time: 1053  OT Stop Time: 1100  OT Total Time (min): 7 min    Billable Minutes:Evaluation 7 1/23/2024

## 2024-01-23 NOTE — PROGRESS NOTES
01/23/24 1530   Required for all Hemodialysis Patients   Hepatitis Status negative   Handoff Report   Received From Román WHITE RN   Given To Brenda RAMIREZ RN   Treatment Type   Treatment Type Maintenance   Vital Signs   Temp 97.6 °F (36.4 °C)   Temp Source Oral   Pulse 69   Heart Rate Source Monitor   Resp 18   SpO2 99 %   Device (Oxygen Therapy) room air   /66   BP Method Automatic   Patient Position Lying   Assessments (Pre/Post)   Blood Liters Processed (BLP) 52.7   Transport Modality bed   Level of Consciousness (AVPU) alert   Dialyzer Clearance moderately streaked   Pain   Pain Rating (0-10): Rest 0   Post-Hemodialysis Assessment   Rinseback Volume (mL) 250 mL   Blood Volume Processed (Liters) 52.7 L   Dialyzer Clearance Moderately streaked   Duration of Treatment 180 minutes   Additional Fluid Intake (mL) 800 mL   Total UF (mL) 800 mL   Net Fluid Removal 0   Patient Response to Treatment pt debbi tx   Post-Treatment Weight 79.7 kg (175 lb 11.3 oz)   Treatment Weight Change 0   Post-Hemodialysis Comments pt cramped during the tx and was not able to remove fluid     Pt cramped early in the tx and cramps were not relieved with NS. O net u/f  notified

## 2024-01-23 NOTE — ASSESSMENT & PLAN NOTE
Several instances or RUQ pain  Protonix 40 mg IVP Q 12  CT abdomen and pelvis without contrast  GI consulted

## 2024-01-23 NOTE — ASSESSMENT & PLAN NOTE
Creatine stable for now. BMP reviewed- noted Estimated Creatinine Clearance: 9.4 mL/min (A) (based on SCr of 7.1 mg/dL (H)). according to latest data. Based on current GFR, CKD stage is end stage.  Monitor UOP and serial BMP and adjust therapy as needed. Renally dose meds. Avoid nephrotoxic medications and procedures.

## 2024-01-23 NOTE — CONSULTS
"[facility]  Adult Nutrition   Consult Note (Initial Assessment)     SUMMARY     Recommendations  Recommendation/Intervention:   1. Recommend Wilman BID for wound healing.   2. Recommend Nepro BID for additional protein intake.   3. Recommend continue home phosphorus binder - Velphoro with meals.   4. RD following.  Goals: Maintain po intake >75% at meals by f/u  Nutrition Goal Status: progressing towards goal    Nutrition Diagnosis PES Statement: Increased protein needs related to increased demand to aid in wound healing  as evidenced by non healing foot ulcer and ESRD on HD.    Dietitian Rounds Brief  Pt with good appetite and po intake. Pt requesting Wilman and Protein for her wound to foot. Pt was also on Velphoro at home- Phos 7.6 H. She states she does have some problems swallowing her pills but she know to take them slowly and one at time.    Diet order:   Current Diet Order: Renal/ DM       Evaluation of Received Nutrient/Fluid Intake  Energy Calories Required: meeting needs  Protein Required: meeting needs  Fluid Required: meeting needs    Tolerance: tolerating     % Intake of Estimated Energy Needs: 50 - 75 %  % Meal Intake: 50 - 75 %      Intake/Output Summary (Last 24 hours) at 1/23/2024 1653  Last data filed at 1/23/2024 1530  Gross per 24 hour   Intake 1040 ml   Output 800 ml   Net 240 ml        Anthropometrics  Temp: 97.6 °F (36.4 °C)  Height Method: Stated  Height: 5' 7" (170.2 cm)  Height (inches): 67 in  Weight Method: Standard Scale  Weight: 78.7 kg (173 lb 8 oz)  Weight (lb): 173.5 lb  Ideal Body Weight (IBW), Female: 135 lb  % Ideal Body Weight, Female (lb): 128.52 %  BMI (Calculated): 27.2  BMI Grade: 25 - 29.9 - overweight       Estimated/Assessed Needs  Weight Used For Calorie Calculations: 78.7 kg (173 lb 8 oz)  Energy Calorie Requirements (kcal): 0104-0730 (20-25 kcal/ kg bw)  Energy Need Method: Kcal/kg  Protein Requirements:  gm (1.2-1.5 gm/ kg bw)  Weight Used For " Protein Calculations: 78.7 kg (173 lb 8 oz)     Estimated Fluid Requirement Method: RDA Method  RDA Method (mL): 1574  CHO Requirement: 200-250 gm (40-50%o 2000 kcal)    Reason for Assessment  Reason For Assessment: consult  Relevant Medical History: A-fib,   Anemia,   Arthritis, Diabetes mellitus type II, Diabetic foot infection  ESRD  05/18/2018,  Hyperlipidemia,  Hypertension, Stroke  Interdisciplinary Rounds: did not attend    Nutrition/Diet History  Patient Reported Diet/Restrictions/Preferences: diabetic diet, renal  Spiritual, Cultural Beliefs, Jew Practices, Values that Affect Care: no  Food Allergies: NKFA  Factors Affecting Nutritional Intake: None identified at this time    Nutrition Risk Screen  Nutrition Risk Screen: large or nonhealing wound, burn or pressure injury       Incision/Site 08/26/22 1042 Left Foot-Wound Image: Images linked  MST Score: 2  Have you recently lost weight without trying?: Unsure  Weight loss score: 2  Have you been eating poorly because of a decreased appetite?: No  Appetite score: 0       Weight History:  Wt Readings from Last 10 Encounters:   01/22/24 78.7 kg (173 lb 8 oz)   01/23/24 78.7 kg (173 lb 8 oz)   12/12/23 79.8 kg (175 lb 14.8 oz)   11/28/23 79.2 kg (174 lb 9.7 oz)   11/26/23 79.2 kg (174 lb 9.7 oz)   10/03/23 79.8 kg (176 lb)   10/03/23 79 kg (174 lb 2.6 oz)   09/26/23 79 kg (174 lb 2.6 oz)   06/06/23 79.3 kg (174 lb 13.2 oz)   04/20/23 79.4 kg (175 lb)        Lab/Procedures/Meds: Pertinent Labs/Meds Reviewed    Medications:Pertinent Medications Reviewed  Scheduled Meds:   sodium chloride 0.9%   Intravenous Once    amLODIPine  5 mg Oral Daily    aspirin  81 mg Oral Daily    atorvastatin  80 mg Oral QHS    clopidogreL  75 mg Oral Daily    heparin (porcine)  5,000 Units Subcutaneous Q8H    mupirocin   Nasal BID    pantoprazole  40 mg Intravenous Q12H     Continuous Infusions:   sodium chloride 0.9%       PRN Meds:.acetaminophen, bisacodyL, dextrose 50%,  dextrose 50%, diphenhydrAMINE, glucagon (human recombinant), glucose, glucose, heparin (porcine), hydrALAZINE, insulin aspart U-100, labetaloL, morphine, nitroGLYCERIN, ondansetron, sodium chloride 0.9%, sodium chloride 0.9%, sodium chloride 0.9%    Labs: Pertinent Labs Reviewed  Clinical Chemistry:  Recent Labs   Lab 01/22/24  1349 01/23/24  0434    137   K 5.3* 5.4*    101   CO2 25 25   * 92   BUN 74* 83*   CREATININE 7.1* 8.2*   CALCIUM 9.8 9.3   PROT 7.2 6.3   ALBUMIN 4.1 3.6   BILITOT 0.4 0.4   ALKPHOS 88 71   AST 14 11   ALT 12 9*   ANIONGAP 13 11   MG 2.2  --      CBC:   Recent Labs   Lab 01/23/24  0434   WBC 7.79   RBC 3.90*   HGB 11.1*   HCT 34.8*      MCV 89   MCH 28.5   MCHC 31.9*         Monitor and Evaluation  Food and Nutrient Intake: energy intake, food and beverage intake  Food and Nutrient Adminstration: diet order  Knowledge/Beliefs/Attitudes: food and nutrition knowledge/skill  Physical Activity and Function: nutrition-related ADLs and IADLs  Anthropometric Measurements: weight  Biochemical Data, Medical Tests and Procedures: electrolyte and renal panel, inflammatory profile, lipid profile, gastrointestinal profile, glucose/endocrine profile  Nutrition-Focused Physical Findings: overall appearance     Nutrition Risk  Level of Risk/Frequency of Follow-up:  (weekly)     Nutrition Follow-Up         Karina May RD 01/23/2024 4:53 PM

## 2024-01-23 NOTE — PT/OT/SLP EVAL
Speech Language Pathology Evaluation  Cognitive/Bedside Swallow    Patient Name:  Leanna García   MRN:  [id number]  Admitting Diagnosis: Complication of vascular dialysis catheter    Recommendations:                  General Recommendations:  Follow-up not indicated, Consider GI referral upon DC  Diet recommendations:  Regular (gravy on all meats), Thin   Aspiration Precautions: Meds whole 1 at a time and Standard aspiration precautions   General Precautions: Standard, aspiration, fall  Communication strategies:  none    Assessment:   Clinical swallow and cognitive communication evaluations completed. She reports s/s possible esophageal dysphagia. Pt underwent MBSS 5/2021 with recommendation of GI referral, however, she denies seeing GI. She presents with pre-existing speech impairment. At this time, speech, language and cognition appear to be at baseline. Skilled ST not indicated at this time. Please re consult PRN.       History:   PER HPI: 58 year old pt getting admitted from Dialysis center in [place] with chest pain  Pt was supposed to have HD today  She started experiencing pain on R side of chest near the Dialysis catheter area when HD was started  Pt experienced severe chest pain followed by nausea with one episode of vomiting  When Hd was stopped her chest pain mostly went away  Pt has AV fistula on LUE and she was supposed to have a revision surgery on Feb 2024 because fistula is not working  Denies any other c/o or issues  Pts Nephrologist is Dr Madie foote MD  Pt established care with Dr STEVE Adame on October 2023    Past Medical History:   Diagnosis Date    A-fib     resolved per patient    Anemia due to end stage renal disease 6/30/2022    Arthritis     Bronchitis     Cardiovascular event risk, ASCVD 10-year risk 6.7% 10/15/2016    Diabetes mellitus type II     Diabetic foot infection     Diabetic ulcer of left midfoot 05/05/2022    Disorder of kidney and ureter     Encounter for blood  transfusion     ESRD (end stage renal disease) 05/18/2018    MANJINDER (generalized anxiety disorder) 10/25/2016    History of colon polyps 11/02/2016    Hyperlipidemia     Hypertension     Stroke        Past Surgical History:   Procedure Laterality Date    ANGIOGRAPHY OF LOWER EXTREMITY Left 10/18/2022    Procedure: Angiogram Extremity Unilateral;  Surgeon: Ali Khoobehi, MD;  Location: [facility] CATH/EP LAB;  Service: Vascular;  Laterality: Left;    AV FISTULA PLACEMENT Left 8/29/2022    Procedure: CREATION, AV FISTULA;  Surgeon: Ali Khoobehi, MD;  Location: [facility] OR;  Service: Vascular;  Laterality: Left;    BONE BIOPSY Left 8/24/2022    Procedure: Biopsy-Bone;  Surgeon: Porfirio Ponce DPM;  Location: [facility] OR;  Service: Podiatry;  Laterality: Left;    COLONOSCOPY N/A 10/5/2016    Procedure: COLONOSCOPY;  Surgeon: Pillo Chanel MD;  Location: [facility] ENDO;  Service: Endoscopy;  Laterality: N/A;    COLONOSCOPY N/A 4/26/2022    Procedure: COLONOSCOPY;  Surgeon: Saravanan Rachel MD;  Location: [facility] ENDO;  Service: Endoscopy;  Laterality: N/A;    FISTULOGRAM Left 8/24/2022    Procedure: Fistulogram;  Surgeon: Ali Khoobehi, MD;  Location: [facility] CATH/EP LAB;  Service: Vascular;  Laterality: Left;    HERNIA REPAIR Bilateral 11/22/2016    inguinal    HYSTERECTOMY      INCISION AND DRAINAGE FOOT Left 5/13/2022    Procedure: INCISION AND DRAINAGE, FOOT;  Surgeon: Ricardo Bruno DPM;  Location: [facility] OR;  Service: Podiatry;  Laterality: Left;    OOPHORECTOMY      THROMBECTOMY, AV FISTULA, UPPER EXTREMITY, PERCUTANEOUS  8/24/2022    Procedure: THROMBECTOMY, AV FISTULA, UPPER EXTREMITY, PERCUTANEOUS;  Surgeon: Ali Khoobehi, MD;  Location: [facility] CATH/EP LAB;  Service: Vascular;;    TOE AMPUTATION Left 8/26/2022    Procedure: AMPUTATION, TOE;  Surgeon: Porfirio Ponce DPM;  Location: [facility] OR;  Service: Podiatry;  Laterality: Left;       Social History: Patient lives with .    Prior Intubation HX:  NA    Modified Barium Swallow  "5/20/2021: "mild-moderate oral-pharyngeal dysphagia (primarily pharyngeal in nature); some degree of cervical esophageal dysphagia; can't rule out further esophageal dysphagia -- consider GI referral if warranted     No visible aspiration during study but some risk due to extent of pharyngeal residue (potential for chronic microaspiration after swallows from pharyngeal residue)"    Imaging:  Imaging Results              X-Ray Chest AP Portable (Final result)  Result time 01/22/24 14:35:16      Final result by Joseph Stephens MD (01/22/24 14:35:16)                   Narrative:    Chest single view    CLINICAL DATA: Pain at Port-A-Cath site    FINDINGS: AP view is compared to November 2022.    Heart size is normal. The mediastinum is unremarkable. The lungs are clear. No acute osseous abnormality is identified.    IMPRESSION:  1. No acute process.    Electronically signed by:  Joseph Stephens MD  01/22/2024 02:35 PM CST Workstation: [id number]                                     Prior diet: Regular/thin.    PLOF: Pt/family report PLOF as assistance with ADLS, assistance with IADLS. Pt does not drive. Level of education is 12th grade. Ambulates with walker but is supposed to be NWB to LLE.     Subjective     "I get indigestion."   shares, "She's always kind of mumbled."     Pain/Comfort:  Pain Rating 1: 5/10  Location 1:  (hip, leg, back)  Pain Addressed 1:  (Pt medicated by RN)    Respiratory Status: Room air    Objective:   Pt seen for clinical swallow and cognitive communication evaluations. She is AAOx4 and cooperative.  at bedside. Pt and  reports speech, language and cognition at baseline. Pt with pre existing  speech impairment. +h/O CVA.     Cognitive Status: MoCA (Version 8.1) administered with pt achieving a score of 19/30 suggesting mild cognitive-linguistic impairment; likely chronic.  Pt earned 3 of 5 points on visuospatial/executive functions, 3 of 3 points on naming, 4 of 6 " points for attention, 1 of 3 points for language, 0 of 2 points for abstraction, 4 of 5 points for delayed recall and 6 of 6 points for orientation.     Problem Solving --verbal  problem solving WFL  Simple calculation --difficulty with functional problem solving tasks re: time and money       Receptive Language:   Comprehension:      WFL    Pragmatics:    WFL    Expressive Language:  Verbal:    WFL      Motor Speech:  Articulation --imprecise with persevered intelligibility    Voice:   Quality Hoarse; began yesterday per pt    Reading:   TBA     Written Expression:   TBA    Oral Musculature Evaluation  Oral Musculature: WFL  Dentition: present and adequate (few missing)  Secretion Management: adequate  Mucosal Quality: adequate  Mandibular Strength and Mobility: WFL  Oral Labial Strength and Mobility: [facility]  Lingual Strength and Mobility: [facility]  Velar Elevation: [facility]  Volitional Cough: adequate  Volitional Swallow: able to palpate laryngeal rise  Voice Prior to PO Intake: hoarse; began yesterday per patient    Bedside Swallow Eval:   Consistencies Assessed:  Thin liquids --via cup and straw  Puree --applesauce  Mixed consistencies --diced peaches  Solids --walter cracker    Medication, whole, one at a time    Oral Phase:   WFL    Pharyngeal Phase:   no overt clinical signs/symptoms of aspiration    Compensatory Strategies  None    Treatment: Pt/family educated re: results/recs of evaluation. Receptive to information provided.       Plan:     Patient to be seen:      Plan of Care expires:     Plan of Care reviewed with:  patient, spouse   SLP Follow-Up:  No       Discharge recommendations:  Therapy Intensity Recommendations at Discharge: No Therapy Indicated   Barriers to Discharge:  None    Time Tracking:     SLP Treatment Date:   01/23/24  Speech Start Time:  1057  Speech Stop Time:  1131     Speech Total Time (min):  34 min    Billable Minutes: Eval 20 , Eval Swallow and Oral Function 14, and Total Time  34    01/23/2024

## 2024-01-23 NOTE — NURSING
Nurses Note -- 4 Eyes      1/22/2024 2000 PM      Skin assessed during: Admit      [] No Altered Skin Integrity Present    []Prevention Measures Documented      [x] Yes- Altered Skin Integrity Present or Discovered   [x] LDA Added if Not in Epic (Describe Wound)   [x] New Altered Skin Integrity was Present on Admit and Documented in LDA   [x] Wound Image Taken    Wound Care Consulted? Yes    Attending Nurse:  Laureano Bailey RN/Staff Member: cain

## 2024-01-23 NOTE — CONSULTS
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  Symptoms improved when dialysis session was discontinued.  Patient had a recent hospitalization and [place] last month for left AV fistula malfunction that was unable to be repaired and so dialysis catheter was placed instead.  She is supposed to have a revision of fistula on February 1st.  Vascular surgeon, Dr. Haas consulted and following.  Catheter appears to be in good position without signs of infection and does not appear at this time to be causing her symptoms.  She did have a nuclear stress test performed in April of 2022 that showed a mild intensity small-sized reversible perfusion abnormality that is consistent with ischemia in the basal to mid inferior lateral walls, soft tissue attenuation also overlying this region with significant patient motion.    code stroke was called this admission as patient developed slurred speech.  Stat CT of brain was ordered and was negative for any acute intracranial abnormality.    Hyperkalemia this admission K 5.3 on arrival, 5.4 this a.m..  Troponin .4, > 108.3, > 106.9.  EKG this admission shows normal sinus rhythm, left axis deviation, minimal voltage criteria for LVH.  No acute STT wave changes.  Attempted  to see the patient yesterday afternoon but she was in dialysis when rounding.      Nuclear stress test performed this am.  EKG portion negative for ischemia.  Nuclear portion pending.         ECHO today     Left Ventricle: The left ventricle is normal in size. Mildly increased wall thickness. There is mild concentric hypertrophy. Normal wall motion. There is normal systolic function with a visually estimated ejection fraction of 60 - 65%. There is normal diastolic function.    Right Ventricle: Normal right ventricular cavity size. Wall thickness is normal. Right ventricle wall motion  is normal. Systolic function is normal.    Aortic Valve: There is mild aortic valve sclerosis.    Mitral Valve: There is mild regurgitation with a centrally directed jet.    IVC/SVC: Normal venous pressure at 3 mmHg.    Pericardium: There is a trivial posterior effusion. No indication of cardiac tamponade.      FROM Hospitalist Progress Note  HPI:  58 year old pt getting admitted from Dialysis center in [place] with chest pain  Pt was supposed to have HD today  She started experiencing pain on R side of chest near the Dialysis catheter area when HD was started  Pt experienced severe chest pain followed by nausea with one episode of vomiting  When Hd was stopped her chest pain mostly went away  Pt has AV fistula on LUE and she was supposed to have a revision surgery on Feb 2024 because fistula is not working  Denies any other c/o or issues  Pts Nephrologist is Dr Madie Ta MD  Pt established care with Dr STEVE Adame on October 2023           Overview/Hospital Course:  1/23/2024  Ms García notes multiple episodes of nausea and vomiting with 1 episode of RUQ abdominal pain. She had problens with her dialysis port yesterday with severe nausea and vomiting  I have consulted nephrology  I have ordered a CT abdomen and pelvis without contrast  Protonix 40 mg IVP Q 12        Interval History: Ms García is not currently nauseated or  vomiting. She has had recent RUQ pain.  Nephrology is consulted for dialysisGI consulted        Review of patient's allergies indicates:   Allergen Reactions    Dilaudid [hydromorphone] Nausea And Vomiting    Chlorhexidine Itching    Lortab [hydrocodone-acetaminophen] Itching       Past Medical History:   Diagnosis Date    A-fib     resolved per patient    Anemia due to end stage renal disease 6/30/2022    Arthritis     Bronchitis     Cardiovascular event risk, ASCVD 10-year risk 6.7% 10/15/2016    Diabetes mellitus type II     Diabetic foot infection     Diabetic ulcer of left midfoot 05/05/2022    Disorder of kidney and ureter     Encounter for blood transfusion     ESRD (end stage renal disease) 05/18/2018    MANJINDER (generalized anxiety disorder) 10/25/2016    History of colon polyps 11/02/2016    Hyperlipidemia     Hypertension     Stroke      Past Surgical History:   Procedure Laterality Date    ANGIOGRAPHY OF LOWER EXTREMITY Left 10/18/2022    Procedure: Angiogram Extremity Unilateral;  Surgeon: Ali Khoobehi, MD;  Location: [facility] CATH/EP LAB;  Service: Vascular;  Laterality: Left;    AV FISTULA PLACEMENT Left 8/29/2022    Procedure: CREATION, AV FISTULA;  Surgeon: Ali Khoobehi, MD;  Location: [facility] OR;  Service: Vascular;  Laterality: Left;    BONE BIOPSY Left 8/24/2022    Procedure: Biopsy-Bone;  Surgeon: Porfirio Ponce DPM;  Location: [facility] OR;  Service: Podiatry;  Laterality: Left;    COLONOSCOPY N/A 10/5/2016    Procedure: COLONOSCOPY;  Surgeon: Pillo Chanel MD;  Location: [facility] ENDO;  Service: Endoscopy;  Laterality: N/A;    COLONOSCOPY N/A 4/26/2022    Procedure: COLONOSCOPY;  Surgeon: Saravanan Rachel MD;  Location: [facility] ENDO;  Service: Endoscopy;  Laterality: N/A;    FISTULOGRAM Left 8/24/2022    Procedure: Fistulogram;  Surgeon: Ali Khoobehi, MD;  Location: [facility] CATH/EP LAB;  Service: Vascular;  Laterality: Left;    HERNIA REPAIR Bilateral 11/22/2016    inguinal    HYSTERECTOMY      INCISION AND  DRAINAGE FOOT Left 5/13/2022    Procedure: INCISION AND DRAINAGE, FOOT;  Surgeon: Ricardo Bruno DPM;  Location: [facility] OR;  Service: Podiatry;  Laterality: Left;    OOPHORECTOMY      THROMBECTOMY, AV FISTULA, UPPER EXTREMITY, PERCUTANEOUS  8/24/2022    Procedure: THROMBECTOMY, AV FISTULA, UPPER EXTREMITY, PERCUTANEOUS;  Surgeon: Ali Khoobehi, MD;  Location: [facility] CATH/EP LAB;  Service: Vascular;;    TOE AMPUTATION Left 8/26/2022    Procedure: AMPUTATION, TOE;  Surgeon: Porfirio Ponce DPM;  Location: [facility] OR;  Service: Podiatry;  Laterality: Left;     Social History     Tobacco Use    Smoking status: Never    Smokeless tobacco: Never   Substance Use Topics    Alcohol use: No    Drug use: No        REVIEW OF SYSTEMS    As mentioned in HPI    OBJECTIVE     VITAL SIGNS (Most Recent)  Temp: 97.6 °F (36.4 °C) (01/23/24 1605)  Pulse: 77 (01/23/24 1605)  Resp: 20 (01/23/24 1605)  BP: 135/62 (01/23/24 1605)  SpO2: 95 % (01/23/24 1605)    VENTILATION STATUS  Resp: 20 (01/23/24 1605)  SpO2: 95 % (01/23/24 1605)           I & O (Last 24H):  Intake/Output Summary (Last 24 hours) at 1/23/2024 1619  Last data filed at 1/23/2024 1530  Gross per 24 hour   Intake 1040 ml   Output 800 ml   Net 240 ml       WEIGHTS  Wt Readings from Last 3 Encounters:   01/22/24 2152 78.7 kg (173 lb 8 oz)   01/22/24 1254 80.1 kg (176 lb 9.4 oz)   01/23/24 1038 78.7 kg (173 lb 8 oz)   12/12/23 1333 79.8 kg (175 lb 14.8 oz)       PHYSICAL EXAM    CONSTITUTIONAL: NAD, chronically ill appearing  HEENT: Normocephalic. No pallor  NECK: no JVD  LUNGS: CTA b/l  HEART: regular rate and rhythm, S1, S2 normal, no murmur   ABDOMEN: soft, non-tender, bowel sounds normal  EXTREMITIES: No edema  SKIN: No rash  NEURO: AAO X 3  PSYCH: normal affect      HOME MEDICATIONS:  No current facility-administered medications on file prior to encounter.     Current Outpatient Medications on File Prior to Encounter   Medication Sig Dispense Refill    amLODIPine (NORVASC) 5 MG  tablet Take 1 tablet by mouth once daily.      atorvastatin (LIPITOR) 80 MG tablet Take 1 tablet (80 mg total) by mouth once daily. 90 tablet 3    cinacalcet (SENSIPAR) 60 MG Tab Take 2 tablets by mouth 2 (two) times daily with meals.      clopidogreL (PLAVIX) 75 mg tablet Take 1 tablet (75 mg total) by mouth once daily. 90 tablet 3    doxycycline (VIBRAMYCIN) 100 MG Cap Take 1 capsule (100 mg total) by mouth 2 (two) times daily. for 14 days 28 capsule 0    hydrALAZINE (APRESOLINE) 25 MG tablet Take 1 tablet (25 mg total) by mouth 2 (two) times daily as needed (Systolic blood pressure > 170 mm Hg).      minoxidiL (LONITEN) 2.5 MG tablet Take 2 tablets by mouth 2 (two) times daily.      multivitamin (THERAGRAN) per tablet Take 1 tablet by mouth once daily.      OZEMPIC 0.25 mg or 0.5 mg (2 mg/3 mL) pen injector INJECT 0.25 MG SUBCUTANEOUSLY ONCE A WEEK (Patient taking differently: Inject 0.25 mg into the skin every 7 days. INJECT 0.25 MG SUBCUTANEOUSLY ONCE A WEEK) 1.5 mL 2    sucroferric oxyhydroxide (VELPHORO) 500 mg Chew Take 2 tablets by mouth 3 (three) times daily.      acetaminophen (TYLENOL) 325 MG tablet Take 325 mg by mouth every 6 (six) hours.      ALPRAZolam (XANAX) 0.5 MG tablet Take 1 tablet by mouth.      blood sugar diagnostic Strp To check BG 4 times daily, to use with insurance preferred meter 450 strip 3    blood sugar diagnostic Strp To check BG 1 times daily, to use with insurance preferred meter 100 each 11    blood sugar diagnostic Strp To check BG 3 times daily, to use with insurance preferred meter 100 strip 1    blood-glucose meter kit To check BG 3 times daily, to use with insurance preferred meter 1 each 0    cetirizine (ZYRTEC) 10 MG tablet Take 1 tablet (10 mg total) by mouth every other day. 45 tablet 3    doxycycline (VIBRAMYCIN) 100 MG Cap Take 1 capsule (100 mg total) by mouth 2 (two) times daily. for 14 days 28 capsule 0    epoetin chris-epbx (RETACRIT) 4,000 unit/mL injection Inject  "1.11 mLs (4,440 Units total) into the skin every Mon, Wed, Fri.      gabapentin (NEURONTIN) 100 MG capsule Take 1 capsule (100 mg total) by mouth 2 (two) times daily. 180 capsule 3    lancets Misc To check BG 1 times daily, to use with insurance preferred meter 100 each 11    lancets Misc To check BG 3 times daily, to use with insurance preferred meter 100 each 1    pen needle, diabetic (BD ULTRA-FINE ROBERT PEN NEEDLE) 32 gauge x 5/32" Ndle 1 pen by Misc.(Non-Drug; Combo Route) route 4 (four) times daily. To use 4 times per day with insulin injections. 100 each 1    [DISCONTINUED] clorazepate (TRANXENE) 3.75 MG Tab Take 7.5 mg by mouth nightly as needed.       [DISCONTINUED] dextrose (GLUCOSE GEL) 40 % gel Take 37.5 mLs (15,000 mg total) by mouth once as needed (hypoglycemia). 37.5 g 4    [DISCONTINUED] ezetimibe (ZETIA) 10 mg tablet Take 1 tablet (10 mg total) by mouth once daily.      [DISCONTINUED] fenofibrate 160 MG Tab Take 1 tablet (160 mg total) by mouth once daily. 90 tablet 3    [DISCONTINUED] lancing device with lancets (ACCU-CHEK SOFT DEV LANCETS) Kit To check blood sugars 4 times per day 400 each 3    [DISCONTINUED] pantoprazole (PROTONIX) 40 MG tablet Take 1 tablet (40 mg total) by mouth once daily.         SCHEDULED MEDS:   sodium chloride 0.9%   Intravenous Once    amLODIPine  5 mg Oral Daily    aspirin  81 mg Oral Daily    atorvastatin  80 mg Oral QHS    clopidogreL  75 mg Oral Daily    heparin (porcine)  5,000 Units Subcutaneous Q8H    mupirocin   Nasal BID    pantoprazole  40 mg Intravenous Q12H       CONTINUOUS INFUSIONS:   sodium chloride 0.9%         PRN MEDS:acetaminophen, bisacodyL, dextrose 50%, dextrose 50%, diphenhydrAMINE, glucagon (human recombinant), glucose, glucose, heparin (porcine), hydrALAZINE, insulin aspart U-100, labetaloL, morphine, nitroGLYCERIN, ondansetron, sodium chloride 0.9%, sodium chloride 0.9%, sodium chloride 0.9%    LABS AND DIAGNOSTICS     CBC LAST 3 DAYS  Recent Labs " "  Lab 01/22/24  1349 01/23/24  0434   WBC 10.96 7.79   RBC 4.53 3.90*   HGB 12.8 11.1*   HCT 39.9 34.8*   MCV 88 89   MCH 28.3 28.5   MCHC 32.1 31.9*   RDW 15.0* 15.1*    257   MPV 10.3 10.3   GRAN 76.0*  8.3*  --    LYMPH 12.6*  1.4  --    MONO 8.8  1.0  --    BASO 0.07  --    NRBC 0  --        COAGULATION LAST 3 DAYS  No results for input(s): "LABPT", "INR", "APTT" in the last 168 hours.    CHEMISTRY LAST 3 DAYS  Recent Labs   Lab 01/22/24  1349 01/23/24  0434    137   K 5.3* 5.4*    101   CO2 25 25   ANIONGAP 13 11   BUN 74* 83*   CREATININE 7.1* 8.2*   * 92   CALCIUM 9.8 9.3   MG 2.2  --    ALBUMIN 4.1 3.6   PROT 7.2 6.3   ALKPHOS 88 71   ALT 12 9*   AST 14 11   BILITOT 0.4 0.4       CARDIAC PROFILE LAST 3 DAYS  Recent Labs   Lab 01/22/24  1349 01/22/24  1745 01/22/24  2342   TROPONINIHS 137.4* 108.3* 106.9*       ENDOCRINE LAST 3 DAYS  No results for input(s): "TSH", "PROCAL" in the last 168 hours.    LAST ARTERIAL BLOOD GAS  ABG  No results for input(s): "PH", "PO2", "PCO2", "HCO3", "BE" in the last 168 hours.    LAST 7 DAYS MICROBIOLOGY   Microbiology Results (last 7 days)       ** No results found for the last 168 hours. **            MOST RECENT IMAGING  Echo Saline Bubble? Yes    Left Ventricle: The left ventricle is normal in size. Mildly increased   wall thickness. There is mild concentric hypertrophy. Normal wall motion.   There is normal systolic function with a visually estimated ejection   fraction of 60 - 65%. There is normal diastolic function.    Right Ventricle: Normal right ventricular cavity size. Wall thickness   is normal. Right ventricle wall motion  is normal. Systolic function is   normal.    Aortic Valve: There is mild aortic valve sclerosis.    Mitral Valve: There is mild regurgitation with a centrally directed   jet.    IVC/SVC: Normal venous pressure at 3 mmHg.    Pericardium: There is a trivial posterior effusion. No indication of   cardiac " tamponade.      ECHOCARDIOGRAM RESULTS (last 5)  Results for orders placed during the hospital encounter of 01/22/24    Echo Saline Bubble? Yes    Interpretation Summary    Left Ventricle: The left ventricle is normal in size. Mildly increased wall thickness. There is mild concentric hypertrophy. Normal wall motion. There is normal systolic function with a visually estimated ejection fraction of 60 - 65%. There is normal diastolic function.    Right Ventricle: Normal right ventricular cavity size. Wall thickness is normal. Right ventricle wall motion  is normal. Systolic function is normal.    Aortic Valve: There is mild aortic valve sclerosis.    Mitral Valve: There is mild regurgitation with a centrally directed jet.    IVC/SVC: Normal venous pressure at 3 mmHg.    Pericardium: There is a trivial posterior effusion. No indication of cardiac tamponade.      Results for orders placed during the hospital encounter of 05/04/22    Echo    Interpretation Summary  · The left ventricle is normal in size with normal systolic function.  · The estimated ejection fraction is 65%.  · Normal left ventricular diastolic function.  · Normal right ventricular size with normal right ventricular systolic function.  · Mild mitral regurgitation.      Results for orders placed during the hospital encounter of 04/05/22    Echo    Interpretation Summary  · The left ventricle is normal in size with normal systolic function. The estimated ejection fraction is 65%.  · Normal right ventricular size with normal right ventricular systolic function.  · Grade II left ventricular diastolic dysfunction.  · Moderate left atrial enlargement.  · The estimated PA systolic pressure is 38 mmHg.  · Normal central venous pressure (3 mmHg).      Results for orders placed during the hospital encounter of 04/24/18    Transthoracic echo (TTE) complete    Interpretation Summary  · Left ventricle shows mild concentric hypertrophy.  · Left ventricle ejection  fraction is increased (hyperdynamic).  · Left ventricular diastolic filling is abnormal.    Difficult apical windows      CURRENT/PREVIOUS VISIT EKG  Results for orders placed or performed during the hospital encounter of 01/22/24   EKG 12-lead    Collection Time: 01/22/24  1:55 PM    Narrative    Test Reason : R07.9,    Vent. Rate : 080 BPM     Atrial Rate : 080 BPM     P-R Int : 118 ms          QRS Dur : 086 ms      QT Int : 388 ms       P-R-T Axes : 023 -42 069 degrees     QTc Int : 447 ms    Normal sinus rhythm  Left axis deviation  Minimal voltage criteria for LVH, may be normal variant  Abnormal ECG  When compared with ECG of 03-OCT-2023 11:24,  Criteria for Septal infarct are no longer Present  Confirmed by Kp ZUNIGA, Cory STARKS ([id number]) on 1/23/2024 7:54:11 AM    Referred By: KELLY   SELF           Confirmed By:Cory Goodrich MD           ASSESSMENT/PLAN:     Active Hospital Problems    Diagnosis    *Complication of vascular dialysis catheter    Bilious vomiting with nausea    Chest pain    PAD (peripheral artery disease)    Type 2 diabetes mellitus with kidney complication, with long-term current use of insulin    ESRD (end stage renal disease)    MANJINDER (generalized anxiety disorder)    Chronic diastolic heart failure    History of atrial fibrillation, 2015    Hypertension associated with diabetes       ASSESSMENT & PLAN:     Atypical Chest pain  Nausea/vomiting  ESRD on HD  Complication of vascular dialysis catheter  Diabetes mellitus  Hypertension  PAF- not on anticoagulation    RECOMMENDATIONS:    Patient presented from dialysis clinic with complaints of chest pain and nausea vomiting during HD.  Chest pain was near dialysis catheter site.  Chest pain resolved as dialysis was discontinued.  No obvious malfunction or complications of dialysis catheter at this time.  Vascular surgery following.  No acute STT wave changes on EKG.  Mildly elevated troponin HS downtrended.  Nuclear stress test performed this  a.m..  Negative for reversible ischemia.  Chest pain does not appear cardiac in nature.  No further CP this admission.  Echocardiogram this admission shows normal wall motion with normal systolic and diastolic function.  No acute valvular abnormalities.  Thank you for the consultation.  No further recommendations at this time.  Patient can follow up with cardiology in next 2-3 weeks outpatient.       Yanet Reid NP  Department of Cardiology  Date of Service: 01/23/2024      Patient is currently being evaluated in the dialysis unit she is now receiving her maintenance routine dialysis appears to be comfortable function of the dialysis catheter appears to be somewhat positional.  Able to get an adequate dialysis completed.  Patient had a negative myocardial perfusion imaging study during this admit.  Defer further cardiac intervention at this time.  FINDINGS:  There is relative homogeneous radiotracer uptake by the left ventricular myocardium with no photopenic defects to suggest pharmacologically induced reversible ischemia or infarct on SPECT imaging.     However, the Polar map shows a relative matched defect in the mid lateral left ventricle myocardial wall corresponding to 19% of the overall volume, with up to 17% reversibility along the base/periphery. While this may represent an occult infarct with peripheral reversibility, this is favored to represent artifact given the ejection fraction and SPECT findings (in correlation with laboratory values symptoms, EKG and clinical history may be warranted). The prior images show a similar relative defect on the previous Polar map.     There are no wall motion seen and the estimated ejection fraction is calculated at 77 %.     IMPRESSION:  1. No convincing evidence of pharmacologically induced reversible ischemia or infarct as above.  2. Estimated ejection fraction of  77 %.     Electronically signed by:  Porfirio Marks MD  01/24/2024 11:29 AM [facility] Workstation:  [id number]         I have personally interviewed and examined the patient, I have reviewed the Nurse Practitioner's history and physical, assessment, and plan. I agree with the findings and plan.      Cristian Adame M.D.  Department of Cardiology  Date of Service: 01/23/2024  4:19 PM

## 2024-01-23 NOTE — HOSPITAL COURSE
1/23/2024  Ms García notes multiple episodes of nausea and vomiting with 1 episode of RUQ abdominal pain. She had problens with her dialysis port yesterday with severe nausea and vomiting  I have consulted nephrology  I have ordered a CT abdomen and pelvis without contrast  Protonix 40 mg IVP Q 12    1/24/2024  Ms García is feeling better today  She underwent dialysis  Pt had a negative cardiac stress test  She has no nausea vomiting or abdominal pain  ROS see above otherwise negative 8/10 systems  PE:in no distress HEENT PERLE EOM intact moist mucus membranes Neck supple no use of accessory muscles Lungs no crackles wheezes or rhonchi Heart S 1 S 2 RRR no murmur Abdomen BS+ nontender Ext without CC or E pulses 1-2+ Skin no acute finding Neuro no acute sensory or motor deficit

## 2024-01-23 NOTE — PLAN OF CARE
Goals to be met by: 24     Patient will increase functional independence with mobility by performin. Supine to sit with Supervision  2. Sit to stand transfer with Supervision  3. Bed to chair transfer with Supervision using Rolling Walker  4. Gait  x 15 feet with Supervision using Rolling Walker.

## 2024-01-23 NOTE — CONSULTS
Nephrology Consult Note        Patient Name: Leanna García  MRN: [id number]    Patient Class: OP- Observation   Admission Date: 1/22/2024  Length of Stay: 0 days  Date of Service: 1/23/2024    Attending Physician: Usman Stacy MD  Primary Care Provider: Stefano Lopez MD    Reason for Consult: esrd    SUBJECTIVE:     HPI: [id number] former patient of Dr. Carlson who moved to [place] is getting admitted from Dialysis center in [place] with chest pain. She started experiencing pain on R side of chest near the dialysis catheter area when HD was started. Severe chest pain followed by nausea with one episode of vomiting. When HD was stopped her chest pain mostly went away. Pt has AV fistula on LUE and she was supposed to have a revision surgery in Feb 2024 because fistula is not working. Pts currnet Nephrologist is Dr Madie Ta MD.  Pt established care with Dr STEVE Adame on October 2023.    Per Dr. Khoobehi, HD line is positioned well and he sees no issues with it. Will attempt HD today.    Slurred speech noted by team, getting stroke evaluation.    Past Medical History:   Diagnosis Date    A-fib     resolved per patient    Anemia due to end stage renal disease 6/30/2022    Arthritis     Bronchitis     Cardiovascular event risk, ASCVD 10-year risk 6.7% 10/15/2016    Diabetes mellitus type II     Diabetic foot infection     Diabetic ulcer of left midfoot 05/05/2022    Disorder of kidney and ureter     Encounter for blood transfusion     ESRD (end stage renal disease) 05/18/2018    MANJINDER (generalized anxiety disorder) 10/25/2016    History of colon polyps 11/02/2016    Hyperlipidemia     Hypertension     Stroke      Past Surgical History:   Procedure Laterality Date    ANGIOGRAPHY OF LOWER EXTREMITY Left 10/18/2022    Procedure: Angiogram Extremity Unilateral;  Surgeon: Ali Khoobehi, MD;  Location: [facility] CATH/EP LAB;  Service: Vascular;  Laterality: Left;    AV FISTULA PLACEMENT Left 8/29/2022    Procedure: CREATION,  AV FISTULA;  Surgeon: Ali Khoobehi, MD;  Location: [facility] OR;  Service: Vascular;  Laterality: Left;    BONE BIOPSY Left 8/24/2022    Procedure: Biopsy-Bone;  Surgeon: Porfirio Ponce DPM;  Location: [facility] OR;  Service: Podiatry;  Laterality: Left;    COLONOSCOPY N/A 10/5/2016    Procedure: COLONOSCOPY;  Surgeon: Pillo Chanel MD;  Location: [facility] ENDO;  Service: Endoscopy;  Laterality: N/A;    COLONOSCOPY N/A 4/26/2022    Procedure: COLONOSCOPY;  Surgeon: Saravanan Rachel MD;  Location: [facility] ENDO;  Service: Endoscopy;  Laterality: N/A;    FISTULOGRAM Left 8/24/2022    Procedure: Fistulogram;  Surgeon: Ali Khoobehi, MD;  Location: [facility] CATH/EP LAB;  Service: Vascular;  Laterality: Left;    HERNIA REPAIR Bilateral 11/22/2016    inguinal    HYSTERECTOMY      INCISION AND DRAINAGE FOOT Left 5/13/2022    Procedure: INCISION AND DRAINAGE, FOOT;  Surgeon: Ricardo Bruno DPM;  Location: [facility] OR;  Service: Podiatry;  Laterality: Left;    OOPHORECTOMY      THROMBECTOMY, AV FISTULA, UPPER EXTREMITY, PERCUTANEOUS  8/24/2022    Procedure: THROMBECTOMY, AV FISTULA, UPPER EXTREMITY, PERCUTANEOUS;  Surgeon: Ali Khoobehi, MD;  Location: [facility] CATH/EP LAB;  Service: Vascular;;    TOE AMPUTATION Left 8/26/2022    Procedure: AMPUTATION, TOE;  Surgeon: Porfirio Pnoce DPM;  Location: [facility] OR;  Service: Podiatry;  Laterality: Left;     Family History   Problem Relation Age of Onset    Hyperlipidemia Mother     Hypertension Mother     Hypertension Father     Diabetes Father     Diabetes Sister     Diabetes Sister     Diabetes Maternal Aunt     Diabetes Maternal Grandmother     Heart disease Maternal Grandmother     Cancer Paternal Grandmother     Breast cancer Neg Hx     Colon cancer Neg Hx     Ovarian cancer Neg Hx      Social History     Tobacco Use    Smoking status: Never    Smokeless tobacco: Never   Substance Use Topics    Alcohol use: No    Drug use: No       Review of patient's allergies indicates:   Allergen Reactions     Dilaudid [hydromorphone] Nausea And Vomiting    Chlorhexidine Itching    Lortab [hydrocodone-acetaminophen] Itching       Outpatient meds:  No current facility-administered medications on file prior to encounter.     Current Outpatient Medications on File Prior to Encounter   Medication Sig Dispense Refill    amLODIPine (NORVASC) 5 MG tablet Take 1 tablet by mouth once daily.      atorvastatin (LIPITOR) 80 MG tablet Take 1 tablet (80 mg total) by mouth once daily. 90 tablet 3    cinacalcet (SENSIPAR) 60 MG Tab Take 2 tablets by mouth 2 (two) times daily with meals.      clopidogreL (PLAVIX) 75 mg tablet Take 1 tablet (75 mg total) by mouth once daily. 90 tablet 3    doxycycline (VIBRAMYCIN) 100 MG Cap Take 1 capsule (100 mg total) by mouth 2 (two) times daily. for 14 days 28 capsule 0    hydrALAZINE (APRESOLINE) 25 MG tablet Take 1 tablet (25 mg total) by mouth 2 (two) times daily as needed (Systolic blood pressure > 170 mm Hg).      minoxidiL (LONITEN) 2.5 MG tablet Take 2 tablets by mouth 2 (two) times daily.      multivitamin (THERAGRAN) per tablet Take 1 tablet by mouth once daily.      OZEMPIC 0.25 mg or 0.5 mg (2 mg/3 mL) pen injector INJECT 0.25 MG SUBCUTANEOUSLY ONCE A WEEK (Patient taking differently: Inject 0.25 mg into the skin every 7 days. INJECT 0.25 MG SUBCUTANEOUSLY ONCE A WEEK) 1.5 mL 2    sucroferric oxyhydroxide (VELPHORO) 500 mg Chew Take 2 tablets by mouth 3 (three) times daily.      acetaminophen (TYLENOL) 325 MG tablet Take 325 mg by mouth every 6 (six) hours.      ALPRAZolam (XANAX) 0.5 MG tablet Take 1 tablet by mouth.      blood sugar diagnostic Strp To check BG 4 times daily, to use with insurance preferred meter 450 strip 3    blood sugar diagnostic Strp To check BG 1 times daily, to use with insurance preferred meter 100 each 11    blood sugar diagnostic Strp To check BG 3 times daily, to use with insurance preferred meter 100 strip 1    blood-glucose meter kit To check BG 3 times daily,  "to use with insurance preferred meter 1 each 0    cetirizine (ZYRTEC) 10 MG tablet Take 1 tablet (10 mg total) by mouth every other day. 45 tablet 3    doxycycline (VIBRAMYCIN) 100 MG Cap Take 1 capsule (100 mg total) by mouth 2 (two) times daily. for 14 days 28 capsule 0    epoetin chris-epbx (RETACRIT) 4,000 unit/mL injection Inject 1.11 mLs (4,440 Units total) into the skin every Mon, Wed, Fri.      gabapentin (NEURONTIN) 100 MG capsule Take 1 capsule (100 mg total) by mouth 2 (two) times daily. 180 capsule 3    lancets Misc To check BG 1 times daily, to use with insurance preferred meter 100 each 11    lancets Misc To check BG 3 times daily, to use with insurance preferred meter 100 each 1    pen needle, diabetic (BD ULTRA-FINE ROBERT PEN NEEDLE) 32 gauge x 5/32" Ndle 1 pen by Misc.(Non-Drug; Combo Route) route 4 (four) times daily. To use 4 times per day with insulin injections. 100 each 1    [DISCONTINUED] clorazepate (TRANXENE) 3.75 MG Tab Take 7.5 mg by mouth nightly as needed.       [DISCONTINUED] dextrose (GLUCOSE GEL) 40 % gel Take 37.5 mLs (15,000 mg total) by mouth once as needed (hypoglycemia). 37.5 g 4    [DISCONTINUED] ezetimibe (ZETIA) 10 mg tablet Take 1 tablet (10 mg total) by mouth once daily.      [DISCONTINUED] fenofibrate 160 MG Tab Take 1 tablet (160 mg total) by mouth once daily. 90 tablet 3    [DISCONTINUED] lancing device with lancets (ACCU-CHEK SOFT DEV LANCETS) Kit To check blood sugars 4 times per day 400 each 3    [DISCONTINUED] pantoprazole (PROTONIX) 40 MG tablet Take 1 tablet (40 mg total) by mouth once daily.         Scheduled meds:   amLODIPine  5 mg Oral Daily    aspirin  81 mg Oral Daily    atorvastatin  80 mg Oral QHS    clopidogreL  75 mg Oral Daily    heparin (porcine)  5,000 Units Intravenous TID    minoxidiL  5 mg Oral BID    pantoprazole  40 mg Oral Daily       Infusions:   sodium chloride 0.9%         PRN meds:  acetaminophen, bisacodyL, dextrose 50%, dextrose 50%, " diphenhydrAMINE, glucagon (human recombinant), glucose, glucose, hydrALAZINE, insulin aspart U-100, labetaloL, morphine, nitroGLYCERIN, ondansetron, sodium chloride 0.9%, sodium chloride 0.9%    Review of Systems:  Constitutional:  Negative for chills, fever, malaise/fatigue and weight loss.   HENT:  Negative for hearing loss and nosebleeds.    Eyes:  Negative for blurred vision, double vision and photophobia.   Respiratory:  Negative for cough, shortness of breath and wheezing.    Cardiovascular:  Negative for chest pain, palpitations and leg swelling.   Gastrointestinal:  Negative for abdominal pain, constipation, diarrhea, heartburn, nausea and vomiting.   Genitourinary:  Negative for dysuria, frequency and urgency.   Musculoskeletal:  Negative for falls, joint pain and myalgias.   Skin:  Negative for itching and rash.   Neurological:  Negative for dizziness, speech change, focal weakness, loss of consciousness and headaches.   Endo/Heme/Allergies:  Does not bruise/bleed easily.   Psychiatric/Behavioral:  Negative for depression and substance abuse. The patient is not nervous/anxious.      OBJECTIVE:     Vital Signs and IO:  Temp:  [97.3 °F (36.3 °C)-98.8 °F (37.1 °C)]   Pulse:  [78-90]   Resp:  [16-20]   BP: (122-203)/(60-85)   SpO2:  [95 %-99 %]   No intake/output data recorded.  Wt Readings from Last 5 Encounters:   01/22/24 78.7 kg (173 lb 8 oz)   12/12/23 79.8 kg (175 lb 14.8 oz)   11/28/23 79.2 kg (174 lb 9.7 oz)   11/26/23 79.2 kg (174 lb 9.7 oz)   10/03/23 79.8 kg (176 lb)     Body mass index is 27.17 kg/m².    Physical Exam  Constitutional:       General: Patient is not in acute distress.     Appearance: Patient is well-developed. She is not diaphoretic.   HENT:      Head: Normocephalic and atraumatic.      Mouth/Throat: Mucous membranes are moist.   Eyes:      General: No scleral icterus.     Pupils: Pupils are equal, round, and reactive to light.   Cardiovascular:      Rate and Rhythm: Normal rate and  "regular rhythm.   Pulmonary:      Effort: Pulmonary effort is normal. No respiratory distress.      Breath sounds: No stridor.   Abdominal:      General: There is no distension.      Palpations: Abdomen is soft.   Musculoskeletal:         General: No deformity. Normal range of motion.      Cervical back: Neck supple.   Skin:     General: Skin is warm and dry.      Findings: No rash present. No erythema.   Neurological:      Mental Status: Patient is alert and oriented to person, place, and time.      Cranial Nerves: No cranial nerve deficit.   Psychiatric:         Behavior: Behavior normal.     Laboratory:  Recent Labs   Lab 01/22/24  1349 01/23/24  0434    137   K 5.3* 5.4*    101   CO2 25 25   BUN 74* 83*   CREATININE 7.1* 8.2*   * 92       Recent Labs   Lab 01/22/24  1349 01/23/24  0434   CALCIUM 9.8 9.3   ALBUMIN 4.1 3.6   MG 2.2  --        Recent Labs   Lab 03/10/22  0650   PTH, Intact 387.0 H       No results for input(s): "POCTGLUCOSE" in the last 168 hours.    Recent Labs   Lab 06/22/22  0618 10/17/22  1801 06/06/23  1347   Hemoglobin A1C 7.5 H 7.2 H 8.3 H       Recent Labs   Lab 01/22/24  1349 01/23/24  0434   WBC 10.96 7.79   HGB 12.8 11.1*   HCT 39.9 34.8*    257   MCV 88 89   MCHC 32.1 31.9*   MONO 8.8  1.0  --    EOSINOPHIL 1.4  --        Recent Labs   Lab 01/22/24  1349 01/23/24  0434   BILITOT 0.4 0.4   PROT 7.2 6.3   ALBUMIN 4.1 3.6   ALKPHOS 88 71   ALT 12 9*   AST 14 11       Recent Labs   Lab 05/29/22  0900 06/21/22  1612   Color, UA Yellow Yellow   Appearance, UA Clear Clear   pH, UA 8.0 >8.0 A   Specific [place], UA 1.015 1.010   Protein, UA 2+ A 3+ A   Glucose, UA 2+ A 2+ A   Ketones, UA Negative Negative   Urobilinogen, UA Negative Negative   Bilirubin (UA) Negative Negative   Occult Blood UA Negative Negative   Nitrite, UA Negative Negative   RBC, UA 1 1   WBC, UA 5 2   Bacteria Rare Negative   Hyaline Casts, UA 0 12 A       Recent Labs   Lab 05/19/21  0300   POC PH " 7.273 L   POC PCO2 47.8 H   POC HCO3 22.1 L   POC PO2 22 LL   POC SATURATED O2 30 L   POC BE -5   Sample VENOUS       Microbiology Results (last 7 days)       ** No results found for the last 168 hours. **            ASSESSMENT/PLAN:     ESRD on HD MWF via AVF  Hyperkalemia  Atypical chest pain  Next HD per schedule.  Continue current dialysis prescription.  Renal diet - low K, low phos.  No IVs or BP checks on access and/or non-dominant arm.  Appreciate Vascular input from Dr. Khoobehi.  Awaiting Cards eval.    Anemia of CKD  Hgb and HCT are acceptable. Monitor for now.  Will provide FERMÍN and/or IV iron PRN.    MBD / Secondary HPT  Ca, Phos, PTH and vitamin D levels are acceptable.   Phos binders, vitamin D and analogues, calcimimetics will be given as needed.    HTN  BP seem controlled.   Tolerate asymptomatic HTN up to -160.  Continue home meds.  Low sodium diet.    Thank you for allowing us to participate in the care of your patient!   We will follow the patient and provide recommendations as needed.    Patient care time was spent personally by me on the following activities:     Obtaining a history.  Examination of patient.  Providing medical care at the patients bedside.  Developing a treatment plan with patient or surrogate and bedside caregivers.  Ordering and reviewing laboratory studies, radiographic studies, pulse oximetry.  Ordering and performing treatments and interventions.  Evaluation of patient's response to treatment.  Discussions with consultants while on the unit and immediately available to the patient.  Re-evaluation of the patient's condition.  Documentation in the medical record.     Bryce Craig MD    [facility]  [address] [place] [address]  [place] [address]    [phone] - tel  [phone] - fax    1/23/2024

## 2024-01-23 NOTE — CONSULTS
GASTROENTEROLOGY INPATIENT CONSULT NOTE  Patient Name: Leanna García  Patient MRN: [id number]  Patient : 1965    Admit Date: 2024  Service date: 2024    Reason for Consult: nausea    PCP: Stefano Lopez MD    Chief Complaint   Patient presents with    Vascular Access Problem     Patient arrives via EMS from Dialysis for pain at her port site. States she started experiencing pain at her site and nausea during treatment. Pain and nausea has since subsided since treatment stopped.        HPI: Patient is a 58 y.o. female with PMHx ESRD on HD, AFib/CVA(Plavix), anemia on epo, HTN, HLD, DM on ozempic, hysterectomy here for various issues with dialysis. States was having issues w/ dialysis and developed R sided chest pain and nausea. Nausea resolved w/ cessation of HD and debbi HD w/o further nausea today. On ozempic since . Seen by cards and rec repeat stress test given reversible ischemia seen in .     CHART REVIEW:   Colon  - Nml TI; polyps; small roids      Past Medical History:  Past Medical History:   Diagnosis Date    A-fib     resolved per patient    Anemia due to end stage renal disease 2022    Arthritis     Bronchitis     Cardiovascular event risk, ASCVD 10-year risk 6.7% 10/15/2016    Diabetes mellitus type II     Diabetic foot infection     Diabetic ulcer of left midfoot 2022    Disorder of kidney and ureter     Encounter for blood transfusion     ESRD (end stage renal disease) 2018    MANJINDER (generalized anxiety disorder) 10/25/2016    History of colon polyps 2016    Hyperlipidemia     Hypertension     Stroke         Past Surgical History:  Past Surgical History:   Procedure Laterality Date    ANGIOGRAPHY OF LOWER EXTREMITY Left 10/18/2022    Procedure: Angiogram Extremity Unilateral;  Surgeon: Ali Khoobehi, MD;  Location: [facility] CATH/EP LAB;  Service: Vascular;  Laterality: Left;    AV FISTULA PLACEMENT Left 2022    Procedure: CREATION, AV FISTULA;   Surgeon: Ali Khoobehi, MD;  Location: [facility] OR;  Service: Vascular;  Laterality: Left;    BONE BIOPSY Left 8/24/2022    Procedure: Biopsy-Bone;  Surgeon: Porfirio Ponce DPM;  Location: [facility] OR;  Service: Podiatry;  Laterality: Left;    COLONOSCOPY N/A 10/5/2016    Procedure: COLONOSCOPY;  Surgeon: Pillo Chanel MD;  Location: [facility] ENDO;  Service: Endoscopy;  Laterality: N/A;    COLONOSCOPY N/A 4/26/2022    Procedure: COLONOSCOPY;  Surgeon: Saravanan Rachel MD;  Location: [facility] ENDO;  Service: Endoscopy;  Laterality: N/A;    FISTULOGRAM Left 8/24/2022    Procedure: Fistulogram;  Surgeon: Ali Khoobehi, MD;  Location: [facility] CATH/EP LAB;  Service: Vascular;  Laterality: Left;    HERNIA REPAIR Bilateral 11/22/2016    inguinal    HYSTERECTOMY      INCISION AND DRAINAGE FOOT Left 5/13/2022    Procedure: INCISION AND DRAINAGE, FOOT;  Surgeon: Ricardo Bruno DPM;  Location: [facility] OR;  Service: Podiatry;  Laterality: Left;    OOPHORECTOMY      THROMBECTOMY, AV FISTULA, UPPER EXTREMITY, PERCUTANEOUS  8/24/2022    Procedure: THROMBECTOMY, AV FISTULA, UPPER EXTREMITY, PERCUTANEOUS;  Surgeon: Ali Khoobehi, MD;  Location: [facility] CATH/EP LAB;  Service: Vascular;;    TOE AMPUTATION Left 8/26/2022    Procedure: AMPUTATION, TOE;  Surgeon: Porfirio Ponce DPM;  Location: [facility] OR;  Service: Podiatry;  Laterality: Left;        Home Medications:  Medications Prior to Admission   Medication Sig Dispense Refill Last Dose    amLODIPine (NORVASC) 5 MG tablet Take 1 tablet by mouth once daily.   1/22/2024 at 09:00    atorvastatin (LIPITOR) 80 MG tablet Take 1 tablet (80 mg total) by mouth once daily. 90 tablet 3 1/22/2024 at 09:00    cinacalcet (SENSIPAR) 60 MG Tab Take 2 tablets by mouth 2 (two) times daily with meals.   1/22/2024 at 09:00    clopidogreL (PLAVIX) 75 mg tablet Take 1 tablet (75 mg total) by mouth once daily. 90 tablet 3 1/22/2024 at 09:00    doxycycline (VIBRAMYCIN) 100 MG Cap Take 1 capsule (100 mg total) by mouth 2  (two) times daily. for 14 days 28 capsule 0 1/22/2024 at 09:00    hydrALAZINE (APRESOLINE) 25 MG tablet Take 1 tablet (25 mg total) by mouth 2 (two) times daily as needed (Systolic blood pressure > 170 mm Hg).       minoxidiL (LONITEN) 2.5 MG tablet Take 2 tablets by mouth 2 (two) times daily.   1/22/2024 at 09:00    multivitamin (THERAGRAN) per tablet Take 1 tablet by mouth once daily.   1/22/2024 at 09:00    OZEMPIC 0.25 mg or 0.5 mg (2 mg/3 mL) pen injector INJECT 0.25 MG SUBCUTANEOUSLY ONCE A WEEK (Patient taking differently: Inject 0.25 mg into the skin every 7 days. INJECT 0.25 MG SUBCUTANEOUSLY ONCE A WEEK) 1.5 mL 2 1/22/2024 at 09:00    sucroferric oxyhydroxide (VELPHORO) 500 mg Chew Take 2 tablets by mouth 3 (three) times daily.   1/22/2024 at 09:00    acetaminophen (TYLENOL) 325 MG tablet Take 325 mg by mouth every 6 (six) hours.   1/20/2024    ALPRAZolam (XANAX) 0.5 MG tablet Take 1 tablet by mouth.       blood sugar diagnostic Strp To check BG 4 times daily, to use with insurance preferred meter 450 strip 3     blood sugar diagnostic Strp To check BG 1 times daily, to use with insurance preferred meter 100 each 11     blood sugar diagnostic Strp To check BG 3 times daily, to use with insurance preferred meter 100 strip 1     blood-glucose meter kit To check BG 3 times daily, to use with insurance preferred meter 1 each 0     cetirizine (ZYRTEC) 10 MG tablet Take 1 tablet (10 mg total) by mouth every other day. 45 tablet 3     doxycycline (VIBRAMYCIN) 100 MG Cap Take 1 capsule (100 mg total) by mouth 2 (two) times daily. for 14 days 28 capsule 0     epoetin chris-epbx (RETACRIT) 4,000 unit/mL injection Inject 1.11 mLs (4,440 Units total) into the skin every Mon, Wed, Fri.   Unknown    gabapentin (NEURONTIN) 100 MG capsule Take 1 capsule (100 mg total) by mouth 2 (two) times daily. 180 capsule 3     lancets Misc To check BG 1 times daily, to use with insurance preferred meter 100 each 11     lancets Misc To  "check BG 3 times daily, to use with insurance preferred meter 100 each 1     pen needle, diabetic (BD ULTRA-FINE ROBERT PEN NEEDLE) 32 gauge x 5/32" Ndle 1 pen by Misc.(Non-Drug; Combo Route) route 4 (four) times daily. To use 4 times per day with insulin injections. 100 each 1        Inpatient Medications:   sodium chloride 0.9%   Intravenous Once    amLODIPine  5 mg Oral Daily    aspirin  81 mg Oral Daily    atorvastatin  80 mg Oral QHS    clopidogreL  75 mg Oral Daily    heparin (porcine)  5,000 Units Subcutaneous Q8H    mupirocin   Nasal BID    pantoprazole  40 mg Intravenous Q12H     acetaminophen, bisacodyL, dextrose 50%, dextrose 50%, diphenhydrAMINE, glucagon (human recombinant), glucose, glucose, hydrALAZINE, insulin aspart U-100, labetaloL, morphine, nitroGLYCERIN, ondansetron, sodium chloride 0.9%, sodium chloride 0.9%, sodium chloride 0.9%    Review of patient's allergies indicates:   Allergen Reactions    Dilaudid [hydromorphone] Nausea And Vomiting    Chlorhexidine Itching    Lortab [hydrocodone-acetaminophen] Itching       Social History:   Social History     Occupational History    Not on file   Tobacco Use    Smoking status: Never    Smokeless tobacco: Never   Substance and Sexual Activity    Alcohol use: No    Drug use: No    Sexual activity: Yes     Partners: Male       Family History:   Family History   Problem Relation Age of Onset    Hyperlipidemia Mother     Hypertension Mother     Hypertension Father     Diabetes Father     Diabetes Sister     Diabetes Sister     Diabetes Maternal Aunt     Diabetes Maternal Grandmother     Heart disease Maternal Grandmother     Cancer Paternal Grandmother     Breast cancer Neg Hx     Colon cancer Neg Hx     Ovarian cancer Neg Hx        Review of Systems:  A 10 point review of systems was performed and was normal, except as mentioned in the HPI, including constitutional, HEENT, heme, lymph, cardiovascular, respiratory, gastrointestinal, genitourinary, " "neurologic, endocrine, psychiatric and musculoskeletal.      OBJECTIVE:    Physical Exam:  24 Hour Vital Sign Ranges: Temp:  [97.3 °F (36.3 °C)-98.8 °F (37.1 °C)] 97.6 °F (36.4 °C)  Pulse:  [78-90] 78  Resp:  [16-20] 18  SpO2:  [95 %-99 %] 97 %  BP: (122-190)/(60-85) 130/67  Most recent vitals: /67   Pulse 78   Temp 97.6 °F (36.4 °C) (Oral)   Resp 18   Ht 5' 7" (1.702 m)   Wt 78.7 kg (173 lb 8 oz)   SpO2 97%   BMI 27.17 kg/m²    GEN: well-developed, well-nourished, awake and alert, non-toxic appearing adult  HEENT: PERRL, sclera anicteric, oral mucosa pink and moist without lesion  NECK: trachea midline; Good ROM  CV: regular rate and rhythm, no murmurs or gallops;R chest wall HD catheter  RESP: clear to auscultation bilaterally, no wheezes, rhonci or rales  ABD: soft, non-tender, non-distended, normal bowel sounds  EXT: no swelling or edema, 2+ pulses distally  SKIN: no rashes or jaundice  PSYCH: normal affect    Labs:   Recent Labs     01/22/24  1349 01/23/24  0434   WBC 10.96 7.79   MCV 88 89    257     Recent Labs     01/22/24  1349 01/23/24  0434    137   K 5.3* 5.4*    101   CO2 25 25   BUN 74* 83*   * 92     No results for input(s): "ALB" in the last 72 hours.    Invalid input(s): "ALKP", "SGOT", "SGPT", "TBIL", "DBIL", "TPRO"  No results for input(s): "PT", "INR", "PTT" in the last 72 hours.      Radiology Review:  CT HEAD FOR STROKE   Final Result      X-Ray Chest AP Portable   Final Result      CT Abdomen Pelvis  Without Contrast    (Results Pending)         IMPRESSION / RECOMMENDATIONS:  58 y.o. female with PMHx ESRD on HD, AFib/CVA(Plavix), anemia on epo, HTN, HLD, DM on ozempic, hysterectomy here for various issues with dialysis w. R sided chest pain and nausea. Nausea resolved w/ cessation of HD and debbi HD w/o further nausea today. On ozempic since 8/'23 can also cause delayed gastric emptying.     -F/u CT scan to ensure no abnormalities  -Othewise, conservative " for now as brief symptoms that resolved w/o any interventions  -Continue w/ cards w/u  -If recurrent UGI issues, consider EGD vs holding ozempic.     Thank you for this consult.    Robel Brandon III  1/23/2024  1:10 PM

## 2024-01-23 NOTE — HPI
58 year old pt getting admitted from Dialysis center in [place] with chest pain  Pt was supposed to have HD today  She started experiencing pain on R side of chest near the Dialysis catheter area when HD was started  Pt experienced severe chest pain followed by nausea with one episode of vomiting  When Hd was stopped her chest pain mostly went away  Pt has AV fistula on LUE and she was supposed to have a revision surgery on Feb 2024 because fistula is not working  Denies any other c/o or issues  Pts Nephrologist is Dr Madie Ta MD  Pt established care with Dr STEVE Adame on October 2023

## 2024-01-23 NOTE — PROGRESS NOTES
The enoxaparin 30 mg q24h was changed to Heparin 5000 units SQ q8h, as per recommendation of the enoxaparin manufactuer, and as approved per Dr. Stacy

## 2024-01-23 NOTE — PLAN OF CARE
Problem: Adult Inpatient Plan of Care  Goal: Plan of Care Review  Outcome: Ongoing, Progressing  Goal: Patient-Specific Goal (Individualized)  Outcome: Ongoing, Progressing  Goal: Absence of Hospital-Acquired Illness or Injury  Outcome: Ongoing, Progressing  Goal: Optimal Comfort and Wellbeing  Outcome: Ongoing, Progressing  Goal: Readiness for Transition of Care  Outcome: Ongoing, Progressing     Problem: Diabetes Comorbidity  Goal: Blood Glucose Level Within Targeted Range  Outcome: Ongoing, Progressing     Problem: Adjustment to Illness (Stroke, Ischemic/Transient Ischemic Attack)  Goal: Optimal Coping  Outcome: Ongoing, Progressing     Problem: Bowel Elimination Impaired (Stroke, Ischemic/Transient Ischemic Attack)  Goal: Effective Bowel Elimination  Outcome: Ongoing, Progressing     Problem: Cerebral Tissue Perfusion (Stroke, Ischemic/Transient Ischemic Attack)  Goal: Optimal Cerebral Tissue Perfusion  Outcome: Ongoing, Progressing     Problem: Cognitive Impairment (Stroke, Ischemic/Transient Ischemic Attack)  Goal: Optimal Cognitive Function  Outcome: Ongoing, Progressing     Problem: Communication Impairment (Stroke, Ischemic/Transient Ischemic Attack)  Goal: Improved Communication Skills  Outcome: Ongoing, Progressing     Problem: Functional Ability Impaired (Stroke, Ischemic/Transient Ischemic Attack)  Goal: Optimal Functional Ability  Outcome: Ongoing, Progressing     Problem: Respiratory Compromise (Stroke, Ischemic/Transient Ischemic Attack)  Goal: Effective Oxygenation and Ventilation  Outcome: Ongoing, Progressing     Problem: Sensorimotor Impairment (Stroke, Ischemic/Transient Ischemic Attack)  Goal: Improved Sensorimotor Function  Outcome: Ongoing, Progressing     Problem: Swallowing Impairment (Stroke, Ischemic/Transient Ischemic Attack)  Goal: Optimal Eating and Swallowing without Aspiration  Outcome: Ongoing, Progressing     Problem: Urinary Elimination Impaired (Stroke, Ischemic/Transient  Ischemic Attack)  Goal: Effective Urinary Elimination  Outcome: Ongoing, Progressing     Problem: Fall Injury Risk  Goal: Absence of Fall and Fall-Related Injury  Outcome: Ongoing, Progressing     Problem: Infection  Goal: Absence of Infection Signs and Symptoms  Outcome: Ongoing, Progressing     Problem: Skin Injury Risk Increased  Goal: Skin Health and Integrity  Outcome: Ongoing, Progressing     Problem: Impaired Wound Healing  Goal: Optimal Wound Healing  Outcome: Ongoing, Progressing

## 2024-01-23 NOTE — NURSING
"Patient arrived via wheelchair and was assisted to bed by charge nurse branden and ed tech, I entered the room moments later to assist with pt admit. Head to toe assessment completed at that time. Pt AAOx3. New med orders were entered by MD. When administering those meds and discussing home meds I noticed the patients slurred speech like someone with a swollen tongue. I asked her if she sounded different to herself she stated yes kind of. A family member at bedside. Confirmed that her speech did sound off or slurred. Pupils appeared pinpoint with little reaction to light. I called the ER nurse who see if she ad been slurred in the ED to which he stated "no" Notified Dr. Acosta of the pt status a code stroke was activated. Pt was taken to CT  then to the new floor, bedside report given.   "

## 2024-01-23 NOTE — SUBJECTIVE & OBJECTIVE
Interval History: Ms García is not currently nauseated or vomiting. She has had recent RUQ pain.  Nephrology is consulted for dialysisGI consulted    Review of Systems   Constitutional:  Positive for activity change, appetite change, diaphoresis and fatigue.   HENT: Negative.     Eyes: Negative.    Respiratory: Negative.     Cardiovascular: Negative.    Gastrointestinal: Negative.    Endocrine: Positive for heat intolerance.   Genitourinary:  Positive for decreased urine volume.   Musculoskeletal:  Positive for arthralgias and myalgias.   Skin: Negative.    Allergic/Immunologic: Positive for immunocompromised state.   Neurological:  Positive for weakness.   Hematological:  Bruises/bleeds easily.   Psychiatric/Behavioral:  The patient is nervous/anxious.      Objective:     Vital Signs (Most Recent):  Temp: 97.6 °F (36.4 °C) (01/23/24 0701)  Pulse: 78 (01/23/24 0701)  Resp: 18 (01/23/24 0701)  BP: 130/67 (01/23/24 0701)  SpO2: 97 % (01/23/24 0701) Vital Signs (24h Range):  Temp:  [97.3 °F (36.3 °C)-98.8 °F (37.1 °C)] 97.6 °F (36.4 °C)  Pulse:  [78-90] 78  Resp:  [16-20] 18  SpO2:  [95 %-99 %] 97 %  BP: (122-203)/(60-85) 130/67     Weight: 78.7 kg (173 lb 8 oz)  Body mass index is 27.17 kg/m².    Intake/Output Summary (Last 24 hours) at 1/23/2024 1227  Last data filed at 1/23/2024 0343  Gross per 24 hour   Intake 240 ml   Output --   Net 240 ml         Physical Exam  Vitals and nursing note reviewed.   Constitutional:       Appearance: Normal appearance.   HENT:      Head: Normocephalic and atraumatic.      Nose: Nose normal.      Mouth/Throat:      Mouth: Mucous membranes are moist.   Eyes:      Extraocular Movements: Extraocular movements intact.      Pupils: Pupils are equal, round, and reactive to light.   Cardiovascular:      Rate and Rhythm: Normal rate and regular rhythm.   Pulmonary:      Effort: Pulmonary effort is normal.      Breath sounds: Normal breath sounds.   Abdominal:      General: Bowel sounds are  normal. There is distension.      Tenderness: There is abdominal tenderness. There is no guarding or rebound.   Musculoskeletal:         General: Normal range of motion.      Cervical back: Normal range of motion and neck supple.   Skin:     General: Skin is warm.   Neurological:      Mental Status: She is oriented to person, place, and time.   Psychiatric:      Comments: Mildly dulled affect             Significant Labs: All pertinent labs within the past 24 hours have been reviewed.  Recent Lab Results  (Last 5 results in the past 24 hours)        01/23/24  1135   01/23/24  0434   01/23/24  0048   01/22/24  2342   01/22/24  1745        Albumin   3.6             ALP   71             ALT   9             Anion Gap   11             AST   11             BILIRUBIN TOTAL   0.4  Comment: For infants and newborns, interpretation of results should be based  on gestational age, weight and in agreement with clinical  observations.    Premature Infant recommended reference ranges:  Up to 24 hours.............<8.0 mg/dL  Up to 48 hours............<12.0 mg/dL  3-5 days..................<15.0 mg/dL  6-29 days.................<15.0 mg/dL               BUN   83             Calcium   9.3             Chloride   101             CO2   25             Creatinine   8.2             eGFR   5.2             Glucose   92             Hematocrit   34.8             Hemoglobin   11.1             MCH   28.5             MCHC   31.9             MCV   89             MPV   10.3             Platelet Count   257             POC Glucose 87     97           Potassium   5.4             PROTEIN TOTAL   6.3             RBC   3.90             RDW   15.1             Sodium   137             Troponin I High Sensitivity       106.9  Comment: Troponin results differ between methods. Do not use   results between Troponin methods interchangeably.    Alkaline Phospatase levels above 400 U/L may   cause false positive results.    Access hsTnI should not be used for  patients taking   Asfotase chris (Strensiq).  Critical result [facility] 106.9 pg/mL called to and read back by Leanna Barrientos Rn [facility] 23[facility]Jan-2024 00[facility]     108.3  Comment: Troponin results differ between methods. Do not use   results between Troponin methods interchangeably.    Alkaline Phospatase levels above 400 U/L may   cause false positive results.    Access hsTnI should not be used for patients taking   Asfotase chris (Strensiq).  Critical result [facility] 108.3 pg/mL called to and read back by Dylan Roche  at 22-Jan-2024 18:54 [facility]  Result Rechecked         WBC   7.79                                    Significant Imaging: I have reviewed all pertinent imaging results/findings within the past 24 hours.

## 2024-01-23 NOTE — SUBJECTIVE & OBJECTIVE
HPI:  58 y.o. female admitted for vascular dialysis access complication/pain at site.     Images personally reviewed and interpreted:  Study: {Study:21672}  Study Interpretation: ***     Assessment and plan:  ***    Lytics recommendation: {THROMBOLYTIC THERAPY RECOMMENDATION:29203}  Thrombectomy recommendation: {INTERVENTIONAL REVASCULARIZATION CANDIDATE:07783}  Placement recommendation: {TELESTROKE DISPOSITION RECOMMENDATION:84533}     Past Medical History:   Diagnosis Date    A-fib     resolved per patient    Anemia due to end stage renal disease 6/30/2022    Arthritis     Bronchitis     Cardiovascular event risk, ASCVD 10-year risk 6.7% 10/15/2016    Diabetes mellitus type II     Diabetic foot infection     Diabetic ulcer of left midfoot 05/05/2022    Disorder of kidney and ureter     Encounter for blood transfusion     ESRD (end stage renal disease) 05/18/2018    MANJINDER (generalized anxiety disorder) 10/25/2016    History of colon polyps 11/02/2016    Hyperlipidemia     Hypertension     Stroke      Current Outpatient Medications   Medication Instructions    acetaminophen (TYLENOL) 325 mg, Oral, Every 6 hours    ALPRAZolam (XANAX) 0.5 MG tablet 1 tablet, Oral    amLODIPine (NORVASC) 5 MG tablet 1 tablet, Oral, Daily    atorvastatin (LIPITOR) 80 mg, Oral, Daily    blood sugar diagnostic Strp To check BG 4 times daily, to use with insurance preferred meter    blood sugar diagnostic Strp To check BG 1 times daily, to use with insurance preferred meter    blood sugar diagnostic Strp To check BG 3 times daily, to use with insurance preferred meter    blood-glucose meter kit To check BG 3 times daily, to use with insurance preferred meter    cetirizine (ZYRTEC) 10 mg, Oral, Every other day    cinacalcet (SENSIPAR) 60 MG Tab 2 tablets, Oral, 2 times daily with meals    clopidogreL (PLAVIX) 75 mg, Oral, Daily    doxycycline (VIBRAMYCIN) 100 mg, Oral, 2 times daily    doxycycline (VIBRAMYCIN) 100 mg, Oral, 2 times daily     "epoetin chris-epbx (RETACRIT) 50 Units/kg, Subcutaneous, Every Mon, Wed, Fri    gabapentin (NEURONTIN) 100 mg, Oral, 2 times daily    hydrALAZINE (APRESOLINE) 25 mg, Oral, 2 times daily PRN    lancets Misc To check BG 1 times daily, to use with insurance preferred meter    lancets Misc To check BG 3 times daily, to use with insurance preferred meter    minoxidiL (LONITEN) 2.5 MG tablet 2 tablets, Oral, 2 times daily    multivitamin (THERAGRAN) per tablet 1 tablet, Oral, Daily    OZEMPIC 0.25 mg or 0.5 mg (2 mg/3 mL) pen injector INJECT 0.25 MG SUBCUTANEOUSLY ONCE A WEEK    pen needle, diabetic (BD ULTRA-FINE ROBERT PEN NEEDLE) 32 gauge x 5/32" Ndle 1 pen , Misc.(Non-Drug; Combo Route), 4 times daily, To use 4 times per day with insulin injections.    sucroferric oxyhydroxide (VELPHORO) 500 mg Chew 2 tablets, Oral, 3 times daily            "

## 2024-01-24 ENCOUNTER — CLINICAL SUPPORT (OUTPATIENT)
Dept: CARDIOLOGY | Facility: HOSPITAL | Age: 59
End: 2024-01-24
Attending: EMERGENCY MEDICINE
Payer: MEDICARE

## 2024-01-24 ENCOUNTER — HOSPITAL ENCOUNTER (OUTPATIENT)
Dept: RADIOLOGY | Facility: HOSPITAL | Age: 59
Discharge: HOME | End: 2024-01-24
Attending: EMERGENCY MEDICINE | Admitting: INTERNAL MEDICINE
Payer: MEDICARE

## 2024-01-24 VITALS
WEIGHT: 173.5 LBS | TEMPERATURE: 98 F | HEART RATE: 77 BPM | OXYGEN SATURATION: 99 % | BODY MASS INDEX: 27.23 KG/M2 | RESPIRATION RATE: 18 BRPM | HEIGHT: 67 IN | SYSTOLIC BLOOD PRESSURE: 123 MMHG | DIASTOLIC BLOOD PRESSURE: 55 MMHG

## 2024-01-24 LAB
ALBUMIN SERPL BCP-MCNC: 3.5 G/DL (ref 3.5–5.2)
ALP SERPL-CCNC: 73 U/L (ref 55–135)
ALT SERPL W/O P-5'-P-CCNC: 9 U/L (ref 10–44)
ANION GAP SERPL CALC-SCNC: 9 MMOL/L (ref 8–16)
AST SERPL-CCNC: 11 U/L (ref 10–40)
BACTERIA #/AREA URNS HPF: NEGATIVE /HPF
BILIRUB SERPL-MCNC: 0.3 MG/DL (ref 0.1–1)
BILIRUB UR QL STRIP: NEGATIVE
BUN SERPL-MCNC: 40 MG/DL (ref 6–20)
CALCIUM SERPL-MCNC: 9.1 MG/DL (ref 8.7–10.5)
CHLORIDE SERPL-SCNC: 101 MMOL/L (ref 95–110)
CLARITY UR: CLEAR
CO2 SERPL-SCNC: 26 MMOL/L (ref 23–29)
COLOR UR: YELLOW
CREAT SERPL-MCNC: 5.6 MG/DL (ref 0.5–1.4)
CV PHARM DOSE: 0.4 MG
CV STRESS BASE HR: 83 BPM
DIASTOLIC BLOOD PRESSURE: 54 MMHG
ERYTHROCYTE [DISTWIDTH] IN BLOOD BY AUTOMATED COUNT: 15 % (ref 11.5–14.5)
EST. GFR  (NO RACE VARIABLE): 8.3 ML/MIN/1.73 M^2
GLUCOSE SERPL-MCNC: 104 MG/DL (ref 70–110)
GLUCOSE SERPL-MCNC: 109 MG/DL (ref 70–110)
GLUCOSE SERPL-MCNC: 110 MG/DL (ref 70–110)
GLUCOSE SERPL-MCNC: 134 MG/DL (ref 70–110)
GLUCOSE UR QL STRIP: ABNORMAL
HCT VFR BLD AUTO: 34.5 % (ref 37–48.5)
HGB BLD-MCNC: 10.9 G/DL (ref 12–16)
HGB UR QL STRIP: NEGATIVE
HYALINE CASTS #/AREA URNS LPF: 2 /LPF
KETONES UR QL STRIP: NEGATIVE
LEUKOCYTE ESTERASE UR QL STRIP: NEGATIVE
MCH RBC QN AUTO: 28.1 PG (ref 27–31)
MCHC RBC AUTO-ENTMCNC: 31.6 G/DL (ref 32–36)
MCV RBC AUTO: 89 FL (ref 82–98)
MICROSCOPIC COMMENT: ABNORMAL
NITRITE UR QL STRIP: NEGATIVE
OHS CV CPX 1 MINUTE RECOVERY HEART RATE: 95 BPM
OHS CV CPX 85 PERCENT MAX PREDICTED HEART RATE MALE: 138
OHS CV CPX MAX PREDICTED HEART RATE: 162
OHS CV CPX PATIENT IS FEMALE: 1
OHS CV CPX PATIENT IS MALE: 0
OHS CV CPX PEAK DIASTOLIC BLOOD PRESSURE: 96 MMHG
OHS CV CPX PEAK HEAR RATE: 121 BPM
OHS CV CPX PEAK RATE PRESSURE PRODUCT: NORMAL
OHS CV CPX PEAK SYSTOLIC BLOOD PRESSURE: 243 MMHG
OHS CV CPX PERCENT MAX PREDICTED HEART RATE ACHIEVED: 78
OHS CV CPX RATE PRESSURE PRODUCT PRESENTING: NORMAL
PH UR STRIP: 8 [PH] (ref 5–8)
PLATELET # BLD AUTO: 259 K/UL (ref 150–450)
PMV BLD AUTO: 10.9 FL (ref 9.2–12.9)
POTASSIUM SERPL-SCNC: 4.6 MMOL/L (ref 3.5–5.1)
PROT SERPL-MCNC: 6 G/DL (ref 6–8.4)
PROT UR QL STRIP: ABNORMAL
RBC # BLD AUTO: 3.88 M/UL (ref 4–5.4)
RBC #/AREA URNS HPF: 2 /HPF (ref 0–4)
SODIUM SERPL-SCNC: 136 MMOL/L (ref 136–145)
SP GR UR STRIP: 1.01 (ref 1–1.03)
SQUAMOUS #/AREA URNS HPF: 0 /HPF
SYSTOLIC BLOOD PRESSURE: 135 MMHG
URN SPEC COLLECT METH UR: ABNORMAL
UROBILINOGEN UR STRIP-ACNC: NEGATIVE EU/DL
WBC # BLD AUTO: 6.88 K/UL (ref 3.9–12.7)
WBC #/AREA URNS HPF: 0 /HPF (ref 0–5)

## 2024-01-24 PROCEDURE — 63600175 PHARM REV CODE 636 W HCPCS: Performed by: INTERNAL MEDICINE

## 2024-01-24 PROCEDURE — G0257 UNSCHED DIALYSIS ESRD PT HOS: HCPCS

## 2024-01-24 PROCEDURE — 25000003 PHARM REV CODE 250: Performed by: INTERNAL MEDICINE

## 2024-01-24 PROCEDURE — 96376 TX/PRO/DX INJ SAME DRUG ADON: CPT

## 2024-01-24 PROCEDURE — 90935 HEMODIALYSIS ONE EVALUATION: CPT

## 2024-01-24 PROCEDURE — 82962 GLUCOSE BLOOD TEST: CPT | Mod: 91

## 2024-01-24 PROCEDURE — 86706 HEP B SURFACE ANTIBODY: CPT | Performed by: INTERNAL MEDICINE

## 2024-01-24 PROCEDURE — 80053 COMPREHEN METABOLIC PANEL: CPT | Performed by: INTERNAL MEDICINE

## 2024-01-24 PROCEDURE — G0378 HOSPITAL OBSERVATION PER HR: HCPCS

## 2024-01-24 PROCEDURE — 81001 URINALYSIS AUTO W/SCOPE: CPT | Performed by: INTERNAL MEDICINE

## 2024-01-24 PROCEDURE — 87340 HEPATITIS B SURFACE AG IA: CPT | Performed by: INTERNAL MEDICINE

## 2024-01-24 PROCEDURE — 93016 CV STRESS TEST SUPVJ ONLY: CPT | Mod: ,,, | Performed by: INTERNAL MEDICINE

## 2024-01-24 PROCEDURE — 96374 THER/PROPH/DIAG INJ IV PUSH: CPT | Mod: 59

## 2024-01-24 PROCEDURE — 85027 COMPLETE CBC AUTOMATED: CPT | Performed by: INTERNAL MEDICINE

## 2024-01-24 PROCEDURE — 96372 THER/PROPH/DIAG INJ SC/IM: CPT | Mod: 59 | Performed by: INTERNAL MEDICINE

## 2024-01-24 PROCEDURE — 93017 CV STRESS TEST TRACING ONLY: CPT

## 2024-01-24 PROCEDURE — A9502 TC99M TETROFOSMIN: HCPCS

## 2024-01-24 PROCEDURE — C9113 INJ PANTOPRAZOLE SODIUM, VIA: HCPCS | Performed by: INTERNAL MEDICINE

## 2024-01-24 PROCEDURE — 25000003 PHARM REV CODE 250: Performed by: STUDENT IN AN ORGANIZED HEALTH CARE EDUCATION/TRAINING PROGRAM

## 2024-01-24 PROCEDURE — 93018 CV STRESS TEST I&R ONLY: CPT | Mod: ,,, | Performed by: INTERNAL MEDICINE

## 2024-01-24 PROCEDURE — 99213 OFFICE O/P EST LOW 20 MIN: CPT | Mod: 25,,, | Performed by: INTERNAL MEDICINE

## 2024-01-24 RX ORDER — ASPIRIN 81 MG/1
81 TABLET ORAL DAILY
Refills: 0
Start: 2024-01-24 | End: 2025-01-23

## 2024-01-24 RX ORDER — SODIUM CHLORIDE 9 MG/ML
INJECTION, SOLUTION INTRAVENOUS ONCE
Status: DISCONTINUED | OUTPATIENT
Start: 2024-01-24 | End: 2024-01-24 | Stop reason: HOSPADM

## 2024-01-24 RX ORDER — PANTOPRAZOLE SODIUM 40 MG/1
40 TABLET, DELAYED RELEASE ORAL DAILY
Qty: 30 TABLET | Refills: 2 | Status: SHIPPED | OUTPATIENT
Start: 2024-01-24 | End: 2024-04-23

## 2024-01-24 RX ORDER — NITROGLYCERIN 0.4 MG/1
0.4 TABLET SUBLINGUAL EVERY 5 MIN PRN
Qty: 30 TABLET | Refills: 0 | Status: SHIPPED | OUTPATIENT
Start: 2024-01-24 | End: 2024-02-23

## 2024-01-24 RX ORDER — PANTOPRAZOLE SODIUM 40 MG/10ML
40 INJECTION, POWDER, LYOPHILIZED, FOR SOLUTION INTRAVENOUS EVERY 12 HOURS
Qty: 60 EACH | Refills: 0 | Status: SHIPPED | OUTPATIENT
Start: 2024-01-24 | End: 2024-01-29 | Stop reason: CLARIF

## 2024-01-24 RX ORDER — REGADENOSON 0.08 MG/ML
0.4 INJECTION, SOLUTION INTRAVENOUS
Status: COMPLETED | OUTPATIENT
Start: 2024-01-24 | End: 2024-01-24

## 2024-01-24 RX ORDER — AMINOPHYLLINE 25 MG/ML
50 INJECTION, SOLUTION INTRAVENOUS
Status: COMPLETED | OUTPATIENT
Start: 2024-01-24 | End: 2024-01-24

## 2024-01-24 RX ADMIN — REGADENOSON 0.4 MG: 0.08 INJECTION, SOLUTION INTRAVENOUS at 09:01

## 2024-01-24 RX ADMIN — CLOPIDOGREL BISULFATE 75 MG: 75 TABLET, FILM COATED ORAL at 05:01

## 2024-01-24 RX ADMIN — AMINOPHYLLINE 50 MG: 25 INJECTION, SOLUTION INTRAVENOUS at 09:01

## 2024-01-24 RX ADMIN — ONDANSETRON 4 MG: 2 INJECTION INTRAMUSCULAR; INTRAVENOUS at 02:01

## 2024-01-24 RX ADMIN — ASPIRIN 81 MG: 81 TABLET, COATED ORAL at 05:01

## 2024-01-24 RX ADMIN — HYDRALAZINE HYDROCHLORIDE 10 MG: 20 INJECTION INTRAMUSCULAR; INTRAVENOUS at 08:01

## 2024-01-24 RX ADMIN — MUPIROCIN 1 G: 20 OINTMENT TOPICAL at 08:01

## 2024-01-24 RX ADMIN — HEPARIN SODIUM 5000 UNITS: 5000 INJECTION INTRAVENOUS; SUBCUTANEOUS at 05:01

## 2024-01-24 RX ADMIN — PANTOPRAZOLE SODIUM 40 MG: 40 INJECTION, POWDER, LYOPHILIZED, FOR SOLUTION INTRAVENOUS at 08:01

## 2024-01-24 RX ADMIN — ACETAMINOPHEN 650 MG: 325 TABLET ORAL at 01:01

## 2024-01-24 NOTE — PROGRESS NOTES
Nephrology Consult Note        Patient Name: Leanna García  MRN: [id number]    Patient Class: OP- Observation   Admission Date: 1/22/2024  Length of Stay: 0 days  Date of Service: 1/24/2024    Attending Physician: Woody Lynn MD  Primary Care Provider: Stefano Lopez MD    Reason for Consult: esrd    SUBJECTIVE:     HPI: [id number] former patient of Dr. Carlson who moved to [place] is getting admitted from Dialysis center in [place] with chest pain. She started experiencing pain on R side of chest near the dialysis catheter area when HD was started. Severe chest pain followed by nausea with one episode of vomiting. When HD was stopped her chest pain mostly went away. Pt has AV fistula on LUE and she was supposed to have a revision surgery in Feb 2024 because fistula is not working. Pts currnet Nephrologist is Dr Madie Ta MD.  Pt established care with Dr STEVE Adame on October 2023.    Per Dr. Khoobehi, HD line is positioned well and he sees no issues with it. Will attempt HD today.    Slurred speech noted by team, getting stroke evaluation.    1/24 VSS, appreciate Cards and Podiatry eval. Tolerated dialysi well yesterday, HD cath worked fine. Plan for HD today per MWF schedule and can be dc after that if stable and cleared by cards.    Past Medical History:   Diagnosis Date    A-fib     resolved per patient    Anemia due to end stage renal disease 6/30/2022    Arthritis     Bronchitis     Cardiovascular event risk, ASCVD 10-year risk 6.7% 10/15/2016    Diabetes mellitus type II     Diabetic foot infection     Diabetic ulcer of left midfoot 05/05/2022    Disorder of kidney and ureter     Encounter for blood transfusion     ESRD (end stage renal disease) 05/18/2018    MANJINDER (generalized anxiety disorder) 10/25/2016    History of colon polyps 11/02/2016    Hyperlipidemia     Hypertension     Stroke      Past Surgical History:   Procedure Laterality Date    ANGIOGRAPHY OF LOWER EXTREMITY Left 10/18/2022     Procedure: Angiogram Extremity Unilateral;  Surgeon: Ali Khoobehi, MD;  Location: [facility] CATH/EP LAB;  Service: Vascular;  Laterality: Left;    AV FISTULA PLACEMENT Left 8/29/2022    Procedure: CREATION, AV FISTULA;  Surgeon: Ali Khoobehi, MD;  Location: [facility] OR;  Service: Vascular;  Laterality: Left;    BONE BIOPSY Left 8/24/2022    Procedure: Biopsy-Bone;  Surgeon: Porfirio Ponce DPM;  Location: [facility] OR;  Service: Podiatry;  Laterality: Left;    COLONOSCOPY N/A 10/5/2016    Procedure: COLONOSCOPY;  Surgeon: Pillo Chanel MD;  Location: [facility] ENDO;  Service: Endoscopy;  Laterality: N/A;    COLONOSCOPY N/A 4/26/2022    Procedure: COLONOSCOPY;  Surgeon: Saravanan Rachel MD;  Location: [facility] ENDO;  Service: Endoscopy;  Laterality: N/A;    FISTULOGRAM Left 8/24/2022    Procedure: Fistulogram;  Surgeon: Ali Khoobehi, MD;  Location: [facility] CATH/EP LAB;  Service: Vascular;  Laterality: Left;    HERNIA REPAIR Bilateral 11/22/2016    inguinal    HYSTERECTOMY      INCISION AND DRAINAGE FOOT Left 5/13/2022    Procedure: INCISION AND DRAINAGE, FOOT;  Surgeon: Ricardo Bruno DPM;  Location: [facility];  Service: Podiatry;  Laterality: Left;    OOPHORECTOMY      THROMBECTOMY, AV FISTULA, UPPER EXTREMITY, PERCUTANEOUS  8/24/2022    Procedure: THROMBECTOMY, AV FISTULA, UPPER EXTREMITY, PERCUTANEOUS;  Surgeon: Ali Khoobehi, MD;  Location: [facility] CATH/EP LAB;  Service: Vascular;;    TOE AMPUTATION Left 8/26/2022    Procedure: AMPUTATION, TOE;  Surgeon: Porfirio Ponce DPM;  Location: [facility] OR;  Service: Podiatry;  Laterality: Left;     Family History   Problem Relation Age of Onset    Hyperlipidemia Mother     Hypertension Mother     Hypertension Father     Diabetes Father     Diabetes Sister     Diabetes Sister     Diabetes Maternal Aunt     Diabetes Maternal Grandmother     Heart disease Maternal Grandmother     Cancer Paternal Grandmother     Breast cancer Neg Hx     Colon cancer Neg Hx     Ovarian cancer Neg Hx      Social  History     Tobacco Use    Smoking status: Never    Smokeless tobacco: Never   Substance Use Topics    Alcohol use: No    Drug use: No       Review of patient's allergies indicates:   Allergen Reactions    Dilaudid [hydromorphone] Nausea And Vomiting    Chlorhexidine Itching    Lortab [hydrocodone-acetaminophen] Itching       Outpatient meds:  No current facility-administered medications on file prior to encounter.     Current Outpatient Medications on File Prior to Encounter   Medication Sig Dispense Refill    amLODIPine (NORVASC) 5 MG tablet Take 1 tablet by mouth once daily.      atorvastatin (LIPITOR) 80 MG tablet Take 1 tablet (80 mg total) by mouth once daily. 90 tablet 3    cinacalcet (SENSIPAR) 60 MG Tab Take 2 tablets by mouth 2 (two) times daily with meals.      clopidogreL (PLAVIX) 75 mg tablet Take 1 tablet (75 mg total) by mouth once daily. 90 tablet 3    doxycycline (VIBRAMYCIN) 100 MG Cap Take 1 capsule (100 mg total) by mouth 2 (two) times daily. for 14 days 28 capsule 0    hydrALAZINE (APRESOLINE) 25 MG tablet Take 1 tablet (25 mg total) by mouth 2 (two) times daily as needed (Systolic blood pressure > 170 mm Hg).      minoxidiL (LONITEN) 2.5 MG tablet Take 2 tablets by mouth 2 (two) times daily.      multivitamin (THERAGRAN) per tablet Take 1 tablet by mouth once daily.      OZEMPIC 0.25 mg or 0.5 mg (2 mg/3 mL) pen injector INJECT 0.25 MG SUBCUTANEOUSLY ONCE A WEEK (Patient taking differently: Inject 0.25 mg into the skin every 7 days. INJECT 0.25 MG SUBCUTANEOUSLY ONCE A WEEK) 1.5 mL 2    sucroferric oxyhydroxide (VELPHORO) 500 mg Chew Take 2 tablets by mouth 3 (three) times daily.      acetaminophen (TYLENOL) 325 MG tablet Take 325 mg by mouth every 6 (six) hours.      ALPRAZolam (XANAX) 0.5 MG tablet Take 1 tablet by mouth.      blood sugar diagnostic Strp To check BG 4 times daily, to use with insurance preferred meter 450 strip 3    blood sugar diagnostic Strp To check BG 1 times daily, to  "use with insurance preferred meter 100 each 11    blood sugar diagnostic Strp To check BG 3 times daily, to use with insurance preferred meter 100 strip 1    blood-glucose meter kit To check BG 3 times daily, to use with insurance preferred meter 1 each 0    cetirizine (ZYRTEC) 10 MG tablet Take 1 tablet (10 mg total) by mouth every other day. 45 tablet 3    [] doxycycline (VIBRAMYCIN) 100 MG Cap Take 1 capsule (100 mg total) by mouth 2 (two) times daily. for 14 days 28 capsule 0    epoetin chris-epbx (RETACRIT) 4,000 unit/mL injection Inject 1.11 mLs (4,440 Units total) into the skin every Mon, Wed, Fri.      gabapentin (NEURONTIN) 100 MG capsule Take 1 capsule (100 mg total) by mouth 2 (two) times daily. 180 capsule 3    lancets Misc To check BG 1 times daily, to use with insurance preferred meter 100 each 11    lancets Misc To check BG 3 times daily, to use with insurance preferred meter 100 each 1    pen needle, diabetic (BD ULTRA-FINE ROBERT PEN NEEDLE) 32 gauge x 5/32" Ndle 1 pen by Misc.(Non-Drug; Combo Route) route 4 (four) times daily. To use 4 times per day with insulin injections. 100 each 1    [DISCONTINUED] clorazepate (TRANXENE) 3.75 MG Tab Take 7.5 mg by mouth nightly as needed.       [DISCONTINUED] dextrose (GLUCOSE GEL) 40 % gel Take 37.5 mLs (15,000 mg total) by mouth once as needed (hypoglycemia). 37.5 g 4    [DISCONTINUED] ezetimibe (ZETIA) 10 mg tablet Take 1 tablet (10 mg total) by mouth once daily.      [DISCONTINUED] fenofibrate 160 MG Tab Take 1 tablet (160 mg total) by mouth once daily. 90 tablet 3    [DISCONTINUED] lancing device with lancets (ACCU-CHEK SOFT DEV LANCETS) Kit To check blood sugars 4 times per day 400 each 3    [DISCONTINUED] pantoprazole (PROTONIX) 40 MG tablet Take 1 tablet (40 mg total) by mouth once daily.         Scheduled meds:   sodium chloride 0.9%   Intravenous Once    amLODIPine  5 mg Oral Daily    aspirin  81 mg Oral Daily    atorvastatin  80 mg Oral QHS    " clopidogreL  75 mg Oral Daily    heparin (porcine)  5,000 Units Subcutaneous Q8H    mupirocin   Nasal BID    pantoprazole  40 mg Intravenous Q12H       Infusions:   sodium chloride 0.9%         PRN meds:  acetaminophen, bisacodyL, dextrose 50%, dextrose 50%, diphenhydrAMINE, glucagon (human recombinant), glucose, glucose, heparin (porcine), hydrALAZINE, insulin aspart U-100, labetaloL, morphine, nitroGLYCERIN, ondansetron, sodium chloride 0.9%, sodium chloride 0.9%, sodium chloride 0.9%    Review of Systems:    OBJECTIVE:     Vital Signs and IO:  Temp:  [97.1 °F (36.2 °C)-98.2 °F (36.8 °C)]   Pulse:  [68-79]   Resp:  [16-20]   BP: ()/()   SpO2:  [95 %-99 %]   I/O last 3 completed shifts:  In: 1640 [P.O.:840; Other:800]  Out: 800 [Other:800]  Wt Readings from Last 5 Encounters:   01/22/24 78.7 kg (173 lb 8 oz)   01/23/24 78.7 kg (173 lb 8 oz)   12/12/23 79.8 kg (175 lb 14.8 oz)   11/28/23 79.2 kg (174 lb 9.7 oz)   11/26/23 79.2 kg (174 lb 9.7 oz)     Body mass index is 27.17 kg/m².    Physical Exam  Constitutional:       General: Patient is not in acute distress.     Appearance: Patient is well-developed. She is not diaphoretic.   HENT:      Head: Normocephalic and atraumatic.      Mouth/Throat: Mucous membranes are moist.   Eyes:      General: No scleral icterus.     Pupils: Pupils are equal, round, and reactive to light.   Cardiovascular:      Rate and Rhythm: Normal rate and regular rhythm.   Pulmonary:      Effort: Pulmonary effort is normal. No respiratory distress.      Breath sounds: No stridor.   Abdominal:      General: There is no distension.      Palpations: Abdomen is soft.   Musculoskeletal:         General: No deformity. Normal range of motion.      Cervical back: Neck supple.   Skin:     General: Skin is warm and dry.      Findings: No rash present. No erythema.   Neurological:      Mental Status: Patient is alert and oriented to person, place, and time.      Cranial Nerves: No cranial  "nerve deficit.   Psychiatric:         Behavior: Behavior normal.     Laboratory:  Recent Labs   Lab 01/22/24  1349 01/23/24  0434 01/24/24  0428    137 136   K 5.3* 5.4* 4.6    101 101   CO2 25 25 26   BUN 74* 83* 40*   CREATININE 7.1* 8.2* 5.6*   * 92 110         Recent Labs   Lab 01/22/24  1349 01/23/24  0434 01/24/24  0428   CALCIUM 9.8 9.3 9.1   ALBUMIN 4.1 3.6 3.5   MG 2.2  --   --          Recent Labs   Lab 03/10/22  0650   PTH, Intact 387.0 H         No results for input(s): "POCTGLUCOSE" in the last 168 hours.    Recent Labs   Lab 06/22/22  0618 10/17/22  1801 06/06/23  1347   Hemoglobin A1C 7.5 H 7.2 H 8.3 H         Recent Labs   Lab 01/22/24  1349 01/23/24  0434 01/24/24  0428   WBC 10.96 7.79 6.88   HGB 12.8 11.1* 10.9*   HCT 39.9 34.8* 34.5*    257 259   MCV 88 89 89   MCHC 32.1 31.9* 31.6*   MONO 8.8  1.0  --   --    EOSINOPHIL 1.4  --   --          Recent Labs   Lab 01/22/24  1349 01/23/24  0434 01/24/24  0428   BILITOT 0.4 0.4 0.3   PROT 7.2 6.3 6.0   ALBUMIN 4.1 3.6 3.5   ALKPHOS 88 71 73   ALT 12 9* 9*   AST 14 11 11         Recent Labs   Lab 05/29/22  0900 06/21/22  1612 01/24/24  0715   Color, UA Yellow Yellow Yellow   Appearance, UA Clear Clear Clear   pH, UA 8.0 >8.0 A 8.0   Specific Gravity, UA 1.015 1.010 1.010   Protein, UA 2+ A 3+ A 3+ A   Glucose, UA 2+ A 2+ A 1+ A   Ketones, UA Negative Negative Negative   Urobilinogen, UA Negative Negative Negative   Bilirubin (UA) Negative Negative Negative   Occult Blood UA Negative Negative Negative   Nitrite, UA Negative Negative Negative   RBC, UA 1 1 2   WBC, UA 5 2 0   Bacteria Rare Negative Negative   Hyaline Casts, UA 0 12 A 2 A         Recent Labs   Lab 05/19/21  0300   POC PH 7.273 L   POC PCO2 47.8 H   POC HCO3 22.1 L   POC PO2 22 LL   POC SATURATED O2 30 L   POC BE -5   Sample VENOUS         Microbiology Results (last 7 days)       ** No results found for the last 168 hours. **            ASSESSMENT/PLAN:     ESRD " on HD MWF via AV[facility]  Atypical chest pain  Next HD per schedule.  Continue current dialysis prescription.  Renal diet - low K, low phos.  No IVs or BP checks on access and/or non-dominant arm.  Appreciate Vascular input from Dr. Khoobehi and Dr. Haas.  Appreciate Cards eval and agree as outlined.    Anemia of CKD  Hgb and HCT are acceptable. Monitor for now.  Will provide FERMÍN and/or IV iron PRN.    MBD / Secondary HPT  Ca, Phos, PTH and vitamin D levels are acceptable.   Phos binders, vitamin D and analogues, calcimimetics will be given as needed.    HTN  BP seem controlled.   Tolerate asymptomatic HTN up to -160.  Continue home meds.  Low sodium diet.    Thank you for allowing us to participate in the care of your patient!   We will follow the patient and provide recommendations as needed.    Patient care time was spent personally by me on the following activities:     Obtaining a history.  Examination of patient.  Providing medical care at the patients bedside.  Developing a treatment plan with patient or surrogate and bedside caregivers.  Ordering and reviewing laboratory studies, radiographic studies, pulse oximetry.  Ordering and performing treatments and interventions.  Evaluation of patient's response to treatment.  Discussions with consultants while on the unit and immediately available to the patient.  Re-evaluation of the patient's condition.  Documentation in the medical record.     Bryce Craig MD    [facility]  [address] [place] [address]  [place] [address]    [phone] - tel  [phone] - fax    1/24/2024

## 2024-01-24 NOTE — NURSING NOTE
Lexiscan portion of Stress Test completed without complications. Patient verbalized understanding of pre-post test instructions. Test supervision and discharge from lab per Dana Enciso NP.

## 2024-01-24 NOTE — PLAN OF CARE
Problem: Adult Inpatient Plan of Care  Goal: Plan of Care Review  Outcome: Ongoing, Progressing  Goal: Patient-Specific Goal (Individualized)  Outcome: Ongoing, Progressing  Goal: Absence of Hospital-Acquired Illness or Injury  Outcome: Ongoing, Progressing  Goal: Optimal Comfort and Wellbeing  Outcome: Ongoing, Progressing  Goal: Readiness for Transition of Care  Outcome: Ongoing, Progressing     P

## 2024-01-24 NOTE — CONSULTS
Chief complaint:  Vascular Access Problem (Patient arrives via EMS from Dialysis for pain at her port site. States she started experiencing pain at her site and nausea during treatment. Pain and nausea has since subsided since treatment stopped. )      HPI:  Leanna García is a 58 y.o. female presenting with a left arm surgical wound from dialysis fistula insertion. Pt is a pt of Dr. Bruno OP for her left foot, and she is managed by Dr. Bruno for that wound. No other complaints today    PMH:  As per HPI and below:  Past Medical History:   Diagnosis Date    A-fib     resolved per patient    Anemia due to end stage renal disease 6/30/2022    Arthritis     Bronchitis     Cardiovascular event risk, ASCVD 10-year risk 6.7% 10/15/2016    Diabetes mellitus type II     Diabetic foot infection     Diabetic ulcer of left midfoot 05/05/2022    Disorder of kidney and ureter     Encounter for blood transfusion     ESRD (end stage renal disease) 05/18/2018    MANJINDER (generalized anxiety disorder) 10/25/2016    History of colon polyps 11/02/2016    Hyperlipidemia     Hypertension     Stroke        Social History     Socioeconomic History    Marital status:    Tobacco Use    Smoking status: Never    Smokeless tobacco: Never   Substance and Sexual Activity    Alcohol use: No    Drug use: No    Sexual activity: Yes     Partners: Male   Social History Narrative    Caregiver      Social Determinants of Health     Financial Resource Strain: Low Risk  (11/3/2022)    Overall Financial Resource Strain (CARDIA)     Difficulty of Paying Living Expenses: Not hard at all   Food Insecurity: No Food Insecurity (11/3/2022)    Hunger Vital Sign     Worried About Running Out of Food in the Last Year: Never true     Ran Out of Food in the Last Year: Never true   Transportation Needs: No Transportation Needs (11/3/2022)    PRAPARE - Transportation     Lack of Transportation (Medical): No     Lack of Transportation (Non-Medical): No    Physical Activity: Insufficiently Active (11/3/2022)    Exercise Vital Sign     Days of Exercise per Week: 2 days     Minutes of Exercise per Session: 60 min   Stress: No Stress Concern Present (11/3/2022)    [employer] - Occupational Stress Questionnaire     Feeling of Stress : Not at all   Social Connections: Moderately Isolated (11/3/2022)    Social Connection and Isolation Panel [NHANES]     Frequency of Communication with Friends and Family: More than three times a week     Frequency of Social Gatherings with Friends and Family: More than three times a week     Attends Denominational Services: Never     Active Member of Clubs or Organizations: No     Attends Club or Organization Meetings: Never     Marital Status:    Housing Stability: Unknown (11/3/2022)    Housing Stability Vital Sign     Unable to Pay for Housing in the Last Year: No     Unstable Housing in the Last Year: No       Past Surgical History:   Procedure Laterality Date    ANGIOGRAPHY OF LOWER EXTREMITY Left 10/18/2022    Procedure: Angiogram Extremity Unilateral;  Surgeon: Ali Khoobehi, MD;  Location: [facility] CATH/EP LAB;  Service: Vascular;  Laterality: Left;    AV FISTULA PLACEMENT Left 8/29/2022    Procedure: CREATION, AV FISTULA;  Surgeon: Ali Khoobehi, MD;  Location: [facility] OR;  Service: Vascular;  Laterality: Left;    BONE BIOPSY Left 8/24/2022    Procedure: Biopsy-Bone;  Surgeon: Porfirio Ponce DPM;  Location: [facility] OR;  Service: Podiatry;  Laterality: Left;    COLONOSCOPY N/A 10/5/2016    Procedure: COLONOSCOPY;  Surgeon: Pillo Chanel MD;  Location: [facility] ENDO;  Service: Endoscopy;  Laterality: N/A;    COLONOSCOPY N/A 4/26/2022    Procedure: COLONOSCOPY;  Surgeon: Saravanan Rachel MD;  Location: [facility] ENDO;  Service: Endoscopy;  Laterality: N/A;    FISTULOGRAM Left 8/24/2022    Procedure: Fistulogram;  Surgeon: Ali Khoobehi, MD;  Location: [facility] CATH/EP LAB;  Service: Vascular;  Laterality: Left;     HERNIA REPAIR Bilateral 11/22/2016    inguinal    HYSTERECTOMY      INCISION AND DRAINAGE FOOT Left 5/13/2022    Procedure: INCISION AND DRAINAGE, FOOT;  Surgeon: Ricardo Bruno DPM;  Location: [facility] OR;  Service: Podiatry;  Laterality: Left;    OOPHORECTOMY      THROMBECTOMY, AV FISTULA, UPPER EXTREMITY, PERCUTANEOUS  8/24/2022    Procedure: THROMBECTOMY, AV FISTULA, UPPER EXTREMITY, PERCUTANEOUS;  Surgeon: Ali Khoobehi, MD;  Location: [facility] CATH/EP LAB;  Service: Vascular;;    TOE AMPUTATION Left 8/26/2022    Procedure: AMPUTATION, TOE;  Surgeon: Porfirio Ponce DPM;  Location: [facility] OR;  Service: Podiatry;  Laterality: Left;       Family History   Problem Relation Age of Onset    Hyperlipidemia Mother     Hypertension Mother     Hypertension Father     Diabetes Father     Diabetes Sister     Diabetes Sister     Diabetes Maternal Aunt     Diabetes Maternal Grandmother     Heart disease Maternal Grandmother     Cancer Paternal Grandmother     Breast cancer Neg Hx     Colon cancer Neg Hx     Ovarian cancer Neg Hx        Review of patient's allergies indicates:   Allergen Reactions    Dilaudid [hydromorphone] Nausea And Vomiting    Chlorhexidine Itching    Lortab [hydrocodone-acetaminophen] Itching       No current facility-administered medications on file prior to encounter.     Current Outpatient Medications on File Prior to Encounter   Medication Sig Dispense Refill    amLODIPine (NORVASC) 5 MG tablet Take 1 tablet by mouth once daily.      atorvastatin (LIPITOR) 80 MG tablet Take 1 tablet (80 mg total) by mouth once daily. 90 tablet 3    cinacalcet (SENSIPAR) 60 MG Tab Take 2 tablets by mouth 2 (two) times daily with meals.      clopidogreL (PLAVIX) 75 mg tablet Take 1 tablet (75 mg total) by mouth once daily. 90 tablet 3    doxycycline (VIBRAMYCIN) 100 MG Cap Take 1 capsule (100 mg total) by mouth 2 (two) times daily. for 14 days 28 capsule 0    hydrALAZINE (APRESOLINE) 25 MG tablet Take 1 tablet (25 mg total)  "by mouth 2 (two) times daily as needed (Systolic blood pressure > 170 mm Hg).      minoxidiL (LONITEN) 2.5 MG tablet Take 2 tablets by mouth 2 (two) times daily.      multivitamin (THERAGRAN) per tablet Take 1 tablet by mouth once daily.      OZEMPIC 0.25 mg or 0.5 mg (2 mg/3 mL) pen injector INJECT 0.25 MG SUBCUTANEOUSLY ONCE A WEEK (Patient taking differently: Inject 0.25 mg into the skin every 7 days. INJECT 0.25 MG SUBCUTANEOUSLY ONCE A WEEK) 1.5 mL 2    sucroferric oxyhydroxide (VELPHORO) 500 mg Chew Take 2 tablets by mouth 3 (three) times daily.      acetaminophen (TYLENOL) 325 MG tablet Take 325 mg by mouth every 6 (six) hours.      ALPRAZolam (XANAX) 0.5 MG tablet Take 1 tablet by mouth.      blood sugar diagnostic Strp To check BG 4 times daily, to use with insurance preferred meter 450 strip 3    blood sugar diagnostic Strp To check BG 1 times daily, to use with insurance preferred meter 100 each 11    blood sugar diagnostic Strp To check BG 3 times daily, to use with insurance preferred meter 100 strip 1    blood-glucose meter kit To check BG 3 times daily, to use with insurance preferred meter 1 each 0    cetirizine (ZYRTEC) 10 MG tablet Take 1 tablet (10 mg total) by mouth every other day. 45 tablet 3    doxycycline (VIBRAMYCIN) 100 MG Cap Take 1 capsule (100 mg total) by mouth 2 (two) times daily. for 14 days 28 capsule 0    epoetin chris-epbx (RETACRIT) 4,000 unit/mL injection Inject 1.11 mLs (4,440 Units total) into the skin every Mon, Wed, Fri.      gabapentin (NEURONTIN) 100 MG capsule Take 1 capsule (100 mg total) by mouth 2 (two) times daily. 180 capsule 3    lancets Misc To check BG 1 times daily, to use with insurance preferred meter 100 each 11    lancets Misc To check BG 3 times daily, to use with insurance preferred meter 100 each 1    pen needle, diabetic (BD ULTRA-FINE ROBERT PEN NEEDLE) 32 gauge x 5/32" Ndle 1 pen by Misc.(Non-Drug; Combo Route) route 4 (four) times daily. To use 4 times per " "day with insulin injections. 100 each 1    [DISCONTINUED] clorazepate (TRANXENE) 3.75 MG Tab Take 7.5 mg by mouth nightly as needed.       [DISCONTINUED] dextrose (GLUCOSE GEL) 40 % gel Take 37.5 mLs (15,000 mg total) by mouth once as needed (hypoglycemia). 37.5 g 4    [DISCONTINUED] ezetimibe (ZETIA) 10 mg tablet Take 1 tablet (10 mg total) by mouth once daily.      [DISCONTINUED] fenofibrate 160 MG Tab Take 1 tablet (160 mg total) by mouth once daily. 90 tablet 3    [DISCONTINUED] lancing device with lancets (ACCU-CHEK SOFT DEV LANCETS) Kit To check blood sugars 4 times per day 400 each 3    [DISCONTINUED] pantoprazole (PROTONIX) 40 MG tablet Take 1 tablet (40 mg total) by mouth once daily.         ROS: As per HPI and below:  Pertinent items are noted in HPI.      Physical Exam:     Vitals:    24 1520 24 1530 24 1605 24 1927   BP: 130/62 126/66 135/62 (!) 167/76   BP Location:   Right arm Right arm   Patient Position:  Lying Lying Lying   Pulse: 74 69 77 75   Resp: 18 18 20 18   Temp:  97.6 °F (36.4 °C) 97.6 °F (36.4 °C) 98.2 °F (36.8 °C)   TempSrc:  Oral Oral Oral   SpO2:  99% 95% 96%   Weight:       Height:           BP  Min: 94/42  Max: 203/85  Temp  Av.9 °F (36.6 °C)  Min: 97.3 °F (36.3 °C)  Max: 98.8 °F (37.1 °C)  Pulse  Av.6  Min: 68  Max: 90  Resp  Av.9  Min: 16  Max: 20  SpO2  Av.1 %  Min: 95 %  Max: 99 %  Height  Av' 7" (170.2 cm)  Min: 5' 7" (170.2 cm)  Max: 5' 7" (170.2 cm)  Weight  Av.2 kg (174 lb 8.5 oz)  Min: 78.7 kg (173 lb 8 oz)  Max: 80.1 kg (176 lb 9.4 oz)    Body mass index is 27.17 kg/m².          General:             Well developed, well nourished, no apparent distress  HEENT:              NCAT, no JVD, mucous membranes moist, EOM intact  Cardiovascular:  Regular rate and rhythm, normal S1, normal S2, No murmurs, rubs, or gallops  Respiratory:        Normal breath sounds, no wheezes, no rales, no rhonchi  Abdomen:           Bowel sounds " present, non tender, non distended, no masses, no hepatojugular reflux  Extremities:        No clubbing, no cyanosis, no edema  Vascular:            2+ b/l radial.  Peripheral pulses intact.  No carotid bruits.  Neurological:      No focal deficits  Skin:                   No obvious rashes or erythema, left arm surgical wound from fistula insertion; left foot DM ulcer, managed by Dr. Bruno      Lab Results   Component Value Date    WBC 7.79 01/23/2024    HGB 11.1 (L) 01/23/2024    HCT 34.8 (L) 01/23/2024    MCV 89 01/23/2024     01/23/2024     Lab Results   Component Value Date    CHOL 199 07/27/2023    CHOL 251 (H) 03/10/2022    CHOL 321 (H) 01/30/2020     Lab Results   Component Value Date    HDL 45 07/27/2023    HDL 50 03/10/2022    HDL 47 01/30/2020     Lab Results   Component Value Date    LDLCALC 127.6 07/27/2023    LDLCALC 158.0 03/10/2022    LDLCALC 214.6 (H) 01/30/2020     Lab Results   Component Value Date    TRIG 132 07/27/2023    TRIG 215 (H) 03/10/2022    TRIG 297 (H) 01/30/2020     Lab Results   Component Value Date    CHOLHDL 22.6 07/27/2023    CHOLHDL 19.9 (L) 03/10/2022    CHOLHDL 14.6 (L) 01/30/2020     CMP  Recent Labs   Lab 01/23/24  0434   GLU 92   CALCIUM 9.3   ALBUMIN 3.6   PROT 6.3      K 5.4*   CO2 25      BUN 83*   CREATININE 8.2*   ALKPHOS 71   ALT 9*   AST 11   BILITOT 0.4      Lab Results   Component Value Date    TSH 5.000 11/03/2022         Assessment and Recommendations       Diagnoses:    1. Left arm surgical wound (from fistula insertion)    Plan:  1. Xeroform + mepilex daily  2. FU in OP wound care as needed on DC    Complexity:    low

## 2024-01-24 NOTE — PROGRESS NOTES
Patient known to me from outpatient wound care.     Continue with wound care dressing changes consisting of aquacel Ag followed by bulky wrap. No compression.     Wilman and glucerna daily     No surgical intervention by podiatry while in patient.     Patient can follow up outpatient once discharge for continued Wound care.

## 2024-01-24 NOTE — PT/OT/SLP PROGRESS
Physical Therapy      Patient Name:  Leanna García   MRN:  [id number]    Patient not seen today secondary to Off the floor for procedure/surgery, Other (Comment), Patient unwilling to participate. Will follow-up 1/26/24.

## 2024-01-24 NOTE — PROGRESS NOTES
01/24/24 1520   Vital Signs   Temp 98.1 °F (36.7 °C)   Pulse 77   Resp 18   SpO2 99 %   BP (!) 123/55   Post-Hemodialysis Assessment   Rinseback Volume (mL) 250 mL   Blood Volume Processed (Liters) 46.9 L   Dialyzer Clearance Moderately streaked   Duration of Treatment 120 minutes   Total UF (mL) 1800 mL   Net Fluid Removal 1000   Patient Response to Treatment pt tolerated well   Post-Treatment Weight 77.4 kg (170 lb 10.2 oz)   Treatment Weight Change -1.3   Post-Hemodialysis Comments no problems     Pt educated on HD treatment. Report given to Brenda ALEX

## 2024-01-25 ENCOUNTER — PATIENT OUTREACH (OUTPATIENT)
Dept: ADMINISTRATIVE | Facility: CLINIC | Age: 59
End: 2024-01-25
Payer: MEDICARE

## 2024-01-25 LAB
HBV SURFACE AB SER QL: REACTIVE
HBV SURFACE AG SERPL QL IA: NEGATIVE

## 2024-01-25 NOTE — PROGRESS NOTES
C3 nurse spoke with Leanna García  for a [facility] post hospital discharge follow up call. [facility] at 11:30 AM.

## 2024-01-25 NOTE — PLAN OF CARE
Patient d/c at 1725 on 1/24/2024. Late final note:       01/25/24 1412   Final Note   Assessment Type Final Discharge Note   Anticipated Discharge Disposition Home

## 2024-01-25 NOTE — DISCHARGE SUMMARY
[facility]  Discharge Summary      Patient Name: Leanna García  MRN: [id number]  PRIYANKA: [id number]  Patient Class: OP- Observation  Admission Date: 1/22/2024  Hospital Length of Stay: 0 days  Discharge Date and Time:  01/24/2024 8:14 PM  Attending Physician: No att. providers found   Discharging Provider: Woody Lynn MD  Primary Care Provider: Stefano Lopez MD    Primary Care Team: Networked reference to record PCT     HPI:   58 year old pt getting admitted from Dialysis center in [place] with chest pain  Pt was supposed to have HD today  She started experiencing pain on R side of chest near the Dialysis catheter area when HD was started  Pt experienced severe chest pain followed by nausea with one episode of vomiting  When Hd was stopped her chest pain mostly went away  Pt has AV fistula on LUE and she was supposed to have a revision surgery on Feb 2024 because fistula is not working  Denies any other c/o or issues  Pts Nephrologist is Dr Madie Ta MD  Pt established care with Dr STEVE Adame on October 2023        Procedure(s) (LRB):  Fistulogram (Left)      Hospital Course:   1/23/2024  Ms García notes multiple episodes of nausea and vomiting with 1 episode of RUQ abdominal pain. She had problens with her dialysis port yesterday with severe nausea and vomiting  I have consulted nephrology  I have ordered a CT abdomen and pelvis without contrast  Protonix 40 mg IVP Q 12    1/24/2024  Ms García is feeling better today  She underwent dialysis  Pt had a negative cardiac stress test  She has no nausea vomiting or abdominal pain  ROS see above otherwise negative 8/10 systems  PE:in no distress HEENT PERLE EOM intact moist mucus membranes Neck supple no use of accessory muscles Lungs no crackles wheezes or rhonchi Heart S 1 S 2 RRR no murmur Abdomen BS+ nontender Ext without CC or E pulses 1-2+ Skin no acute finding Neuro no acute sensory or motor deficit       Goals of  Care Treatment Preferences:  Code Status: Full Code      Consults:   Consults (From admission, onward)          Status Ordering Provider     Inpatient consult to Podiatry  Once        Provider:  Alivia Bruno DPM    Acknowledged CARLOS ENRIQUE GONZALEZ     Inpatient consult to Cardiology  Once        Provider:  Cory Goodrich MD    Completed EDMOND COFFMAN     Inpatient consult to Gastroenterology  Once        Provider:  Robel Brandon III, MD    Completed EDMOND COFFMAN     Inpatient consult to Nephrology  Once        Provider:  Bryce Craig MD    Completed EDMOND COFFMAN     Inpatient consult to Nephrology  Once        Provider:  Bryce Craig MD    Completed EDMOND COFFMAN     Consult to Telemedicine - Acute Stroke  Once        Provider:  (Not yet assigned)    Completed ADETOSWATHI JUNIORADUNNI     Inpatient consult to Registered Dietitian/Nutritionist  Once        Provider:  (Not yet assigned)    Completed ADETOSWATHI JUNIORADUNNI     IP consult to case management/social work  Once        Provider:  (Not yet assigned)    Completed ALVARO RAYA     Inpatient consult to Nephrology  Once        Provider:  Bryce Craig MD    Completed ELI BIGGS     Inpatient consult to Vascular Surgery  Once        Provider:  Khoobehi, Ali, MD    Completed ELI BIGGS            No new Assessment & Plan notes have been filed under this hospital service since the last note was generated.  Service: Hospital Medicine    Final Active Diagnoses:    Diagnosis Date Noted POA    PRINCIPAL PROBLEM:  Complication of vascular dialysis catheter [T82.9XXA] 01/22/2024 Yes    Bilious vomiting with nausea [R11.14] 01/23/2024 Unknown    Chest pain [R07.9] 01/22/2024 Yes    PAD (peripheral artery disease) [I73.9] 06/27/2022 Yes    Type 2 diabetes mellitus with kidney complication, with long-term current use of insulin [E11.29, Z79.4] 06/25/2020 Not Applicable    ESRD (end stage renal disease) [N18.6] 05/18/2018 Yes    MANJINDER  (generalized anxiety disorder) [F41.1] 10/25/2016 Yes    Chronic diastolic heart failure [I50.32] 10/15/2016 Yes    History of atrial fibrillation, 2015 [Z86.79] 06/22/2016 Not Applicable    Hypertension associated with diabetes [E11.59, I15.2] 06/13/2013 Yes     Chronic      Problems Resolved During this Admission:       Discharged Condition: good    Disposition: Home or Self Care    Follow Up:   Follow-up Information       [facility]. Go on 2/1/2024.    Specialty: Primary Care  Why: Hospital follow up at 11:30 a.m. on 2/1.  Contact information:  [address]  [facility] [id number] [phone]  Additional information:  [address]             Stefano Lopez MD Follow up in 2 week(s).    Specialty: Family Medicine  Contact information:  [address] [facility] [address]  [phone]               Alivia Bruno DPM Follow up in 1 week(s).    Specialties: Podiatry, Surgery, Wound Care  Contact information:  [address]  [address]  [facility] PODIATRY  [facility] [address]  [phone]               Bryce Craig MD Follow up in 2 day(s).    Specialty: Nephrology  Contact information:  [address] [facility] NEPHROLOGY [facility] [address]  [phone]               Cory Goodrich MD Follow up in 1 month(s).    Specialties: Cardiovascular Disease, Cardiology  Contact information:  [address]  [address]  [facility] [address]  [phone]                           Patient Instructions:      Ambulatory referral/consult to Home Health   Standing Status: Future   Referral Priority: Routine Referral Type: Home Health Care   Referral Reason: Specialty Services Required   Requested Specialty: Home Health Services   Number of Visits Requested: 1     Diet renal     Diet Cardiac     Diet diabetic     Activity as tolerated       Significant Diagnostic Studies: Labs: BMP:   Recent Labs   Lab 01/23/24  0434 01/24/24  0428   GLU 92 110    136   K 5.4* 4.6  "   101   CO2 25 26   BUN 83* 40*   CREATININE 8.2* 5.6*   CALCIUM 9.3 9.1   , CMP   Recent Labs   Lab 01/23/24 0434 01/24/24 0428    136   K 5.4* 4.6    101   CO2 25 26   GLU 92 110   BUN 83* 40*   CREATININE 8.2* 5.6*   CALCIUM 9.3 9.1   PROT 6.3 6.0   ALBUMIN 3.6 3.5   BILITOT 0.4 0.3   ALKPHOS 71 73   AST 11 11   ALT 9* 9*   ANIONGAP 11 9   , CBC   Recent Labs   Lab 01/23/24 0434 01/24/24 0428   WBC 7.79 6.88   HGB 11.1* 10.9*   HCT 34.8* 34.5*    259   , INR   Lab Results   Component Value Date    INR 1.3 05/04/2022    INR 1.0 03/10/2022    INR 1.3 05/19/2021   , Troponin No results for input(s): "TROPONINI" in the last 168 hours., and All labs within the past 24 hours have been reviewed  Microbiology: Blood Culture   Lab Results   Component Value Date    LABBLOO No growth after 5 days. 06/21/2022    and Urine Culture    Lab Results   Component Value Date    LABURIN ESCHERICHIA COLI  >100,000 cfu/ml   08/28/2018       Pending Diagnostic Studies:       Procedure Component Value Units Date/Time    Hepatitis B surface antibody [[id number]] Collected: 01/24/24 0428    Order Status: Sent Lab Status: In process Updated: 01/24/24 0446    Specimen: Blood     Narrative:      Collection has been rescheduled by NIKKIE at 01/23/2024 14:10 Reason:   Patient unavailable not in room    Hepatitis B surface antigen [[id number]] Collected: 01/24/24 0428    Order Status: Sent Lab Status: In process Updated: 01/24/24 0446    Specimen: Blood     Narrative:      Collection has been rescheduled by NIKKIE at 01/23/2024 14:10 Reason:   Patient unavailable not in room           Medications:  Reconciled Home Medications:      Medication List        START taking these medications      aspirin 81 MG EC tablet  Commonly known as: ECOTRIN  Take 1 tablet (81 mg total) by mouth once daily.     nitroGLYCERIN 0.4 MG SL tablet  Commonly known as: NITROSTAT  Place 1 tablet (0.4 mg total) under the tongue every 5 (five) minutes " as needed for Chest pain.     * pantoprazole 40 mg injection  Commonly known as: PROTONIX  Inject 40 mg into the vein every 12 (twelve) hours.     * pantoprazole 40 MG tablet  Commonly known as: PROTONIX  Take 1 tablet (40 mg total) by mouth once daily.           * This list has 2 medication(s) that are the same as other medications prescribed for you. Read the directions carefully, and ask your doctor or other care provider to review them with you.                CHANGE how you take these medications      blood sugar diagnostic Strp  To check BG 4 times daily, to use with insurance preferred meter  What changed: Another medication with the same name was removed. Continue taking this medication, and follow the directions you see here.     OZEMPIC 0.25 mg or 0.5 mg (2 mg/3 mL) pen injector  Generic drug: semaglutide  INJECT 0.25 MG SUBCUTANEOUSLY ONCE A WEEK  What changed:   how much to take  how to take this  when to take this            CONTINUE taking these medications      acetaminophen 325 MG tablet  Commonly known as: TYLENOL  Take 325 mg by mouth every 6 (six) hours.     ALPRAZolam 0.5 MG tablet  Commonly known as: XANAX  Take 1 tablet by mouth.     amLODIPine 5 MG tablet  Commonly known as: NORVASC  Take 1 tablet by mouth once daily.     atorvastatin 80 MG tablet  Commonly known as: LIPITOR  Take 1 tablet (80 mg total) by mouth once daily.     blood-glucose meter kit  To check BG 3 times daily, to use with insurance preferred meter     cetirizine 10 MG tablet  Commonly known as: ZYRTEC  Take 1 tablet (10 mg total) by mouth every other day.     cinacalcet 60 MG Tab  Commonly known as: SENSIPAR  Take 2 tablets by mouth 2 (two) times daily with meals.     clopidogreL 75 mg tablet  Commonly known as: PLAVIX  Take 1 tablet (75 mg total) by mouth once daily.     epoetin chris-epbx 4,000 unit/mL injection  Commonly known as: RETACRIT  Inject 1.11 mLs (4,440 Units total) into the skin every Mon, Wed, Fri.    "  gabapentin 100 MG capsule  Commonly known as: NEURONTIN  Take 1 capsule (100 mg total) by mouth 2 (two) times daily.     hydrALAZINE 25 MG tablet  Commonly known as: APRESOLINE  Take 1 tablet (25 mg total) by mouth 2 (two) times daily as needed (Systolic blood pressure > 170 mm Hg).     * lancets Misc  To check BG 1 times daily, to use with insurance preferred meter     * lancets Misc  To check BG 3 times daily, to use with insurance preferred meter     minoxidiL 2.5 MG tablet  Commonly known as: LONITEN  Take 2 tablets by mouth 2 (two) times daily.     multivitamin per tablet  Commonly known as: THERAGRAN  Take 1 tablet by mouth once daily.     pen needle, diabetic 32 gauge x 5/32" Ndle  Commonly known as: BD ULTRA-FINE ROBERT PEN NEEDLE  1 pen by Misc.(Non-Drug; Combo Route) route 4 (four) times daily. To use 4 times per day with insulin injections.     VELPHORO 500 mg Chew  Generic drug: sucroferric oxyhydroxide  Take 2 tablets by mouth 3 (three) times daily.           * This list has 2 medication(s) that are the same as other medications prescribed for you. Read the directions carefully, and ask your doctor or other care provider to review them with you.                STOP taking these medications      doxycycline 100 MG Cap  Commonly known as: VIBRAMYCIN              Indwelling Lines/Drains at time of discharge:   Lines/Drains/Airways       Central Venous Catheter Line  Duration                  Hemodialysis Catheter right internal jugular -- days              Drain  Duration                  Hemodialysis AV Fistula Left upper arm -- days                    Time spent on the discharge of patient: 27 minutes         Woody Lynn MD  Department of Hospital Medicine  [facility]  "

## 2024-01-29 ENCOUNTER — HOSPITAL ENCOUNTER (INPATIENT)
Facility: HOSPITAL | Age: 59
LOS: 1 days | Discharge: HOME | DRG: 252 | End: 2024-01-31
Attending: STUDENT IN AN ORGANIZED HEALTH CARE EDUCATION/TRAINING PROGRAM | Admitting: INTERNAL MEDICINE
Payer: MEDICARE

## 2024-01-29 DIAGNOSIS — R55 SYNCOPE: ICD-10-CM

## 2024-01-29 DIAGNOSIS — R07.9 CHEST PAIN: ICD-10-CM

## 2024-01-29 DIAGNOSIS — N18.6 ESRD (END STAGE RENAL DISEASE): ICD-10-CM

## 2024-01-29 DIAGNOSIS — L97.522 ULCER OF LEFT FOOT WITH FAT LAYER EXPOSED: ICD-10-CM

## 2024-01-29 DIAGNOSIS — R55 SYNCOPE, UNSPECIFIED SYNCOPE TYPE: Primary | ICD-10-CM

## 2024-01-29 PROBLEM — E11.9 TYPE 2 DIABETES MELLITUS: Status: ACTIVE | Noted: 2021-06-03

## 2024-01-29 PROBLEM — N25.81 SECONDARY HYPERPARATHYROIDISM OF RENAL ORIGIN: Status: ACTIVE | Noted: 2018-10-21

## 2024-01-29 PROBLEM — J44.9 CHRONIC OBSTRUCTIVE PULMONARY DISEASE, UNSPECIFIED: Status: ACTIVE | Noted: 2018-09-19

## 2024-01-29 LAB
ALBUMIN SERPL BCP-MCNC: 3.8 G/DL (ref 3.5–5.2)
ALP SERPL-CCNC: 96 U/L (ref 55–135)
ALT SERPL W/O P-5'-P-CCNC: 22 U/L (ref 10–44)
ANION GAP SERPL CALC-SCNC: 13 MMOL/L (ref 8–16)
AST SERPL-CCNC: 19 U/L (ref 10–40)
BASOPHILS # BLD AUTO: 0.04 K/UL (ref 0–0.2)
BASOPHILS NFR BLD: 0.5 % (ref 0–1.9)
BILIRUB SERPL-MCNC: 0.3 MG/DL (ref 0.1–1)
BNP SERPL-MCNC: 264 PG/ML (ref 0–99)
BUN SERPL-MCNC: 57 MG/DL (ref 6–20)
CALCIUM SERPL-MCNC: 9.5 MG/DL (ref 8.7–10.5)
CHLORIDE SERPL-SCNC: 98 MMOL/L (ref 95–110)
CHOLEST SERPL-MCNC: 174 MG/DL (ref 120–199)
CHOLEST/HDLC SERPL: 4.5 {RATIO} (ref 2–5)
CO2 SERPL-SCNC: 25 MMOL/L (ref 23–29)
CREAT SERPL-MCNC: 4.6 MG/DL (ref 0.5–1.4)
DIFFERENTIAL METHOD BLD: ABNORMAL
EOSINOPHIL # BLD AUTO: 0.2 K/UL (ref 0–0.5)
EOSINOPHIL NFR BLD: 2.1 % (ref 0–8)
ERYTHROCYTE [DISTWIDTH] IN BLOOD BY AUTOMATED COUNT: 14.8 % (ref 11.5–14.5)
EST. GFR  (NO RACE VARIABLE): 10.5 ML/MIN/1.73 M^2
GLUCOSE SERPL-MCNC: 126 MG/DL (ref 70–110)
GLUCOSE SERPL-MCNC: 163 MG/DL (ref 70–110)
GLUCOSE SERPL-MCNC: 207 MG/DL (ref 70–110)
HCT VFR BLD AUTO: 33.5 % (ref 37–48.5)
HDLC SERPL-MCNC: 39 MG/DL (ref 40–75)
HDLC SERPL: 22.4 % (ref 20–50)
HGB BLD-MCNC: 11.2 G/DL (ref 12–16)
IMM GRANULOCYTES # BLD AUTO: 0.02 K/UL (ref 0–0.04)
IMM GRANULOCYTES NFR BLD AUTO: 0.3 % (ref 0–0.5)
INR PPP: 0.9 (ref 0.8–1.2)
LDLC SERPL CALC-MCNC: ABNORMAL MG/DL (ref 63–159)
LYMPHOCYTES # BLD AUTO: 1.4 K/UL (ref 1–4.8)
LYMPHOCYTES NFR BLD: 17.2 % (ref 18–48)
MAGNESIUM SERPL-MCNC: 2.1 MG/DL (ref 1.6–2.6)
MCH RBC QN AUTO: 29.1 PG (ref 27–31)
MCHC RBC AUTO-ENTMCNC: 33.4 G/DL (ref 32–36)
MCV RBC AUTO: 87 FL (ref 82–98)
MONOCYTES # BLD AUTO: 0.6 K/UL (ref 0.3–1)
MONOCYTES NFR BLD: 7.5 % (ref 4–15)
NEUTROPHILS # BLD AUTO: 5.8 K/UL (ref 1.8–7.7)
NEUTROPHILS NFR BLD: 72.4 % (ref 38–73)
NONHDLC SERPL-MCNC: 135 MG/DL
NRBC BLD-RTO: 0 /100 WBC
PLATELET # BLD AUTO: 246 K/UL (ref 150–450)
PMV BLD AUTO: 10.8 FL (ref 9.2–12.9)
POTASSIUM SERPL-SCNC: 4.4 MMOL/L (ref 3.5–5.1)
PROT SERPL-MCNC: 7 G/DL (ref 6–8.4)
PROTHROMBIN TIME: 10.2 SEC (ref 9–12.5)
RBC # BLD AUTO: 3.85 M/UL (ref 4–5.4)
SODIUM SERPL-SCNC: 136 MMOL/L (ref 136–145)
TRIGL SERPL-MCNC: 419 MG/DL (ref 30–150)
TROPONIN I SERPL HS-MCNC: 26 PG/ML (ref 0–14.9)
TROPONIN I SERPL HS-MCNC: 31 PG/ML (ref 0–14.9)
WBC # BLD AUTO: 7.98 K/UL (ref 3.9–12.7)

## 2024-01-29 PROCEDURE — 80061 LIPID PANEL: CPT | Performed by: INTERNAL MEDICINE

## 2024-01-29 PROCEDURE — 93010 ELECTROCARDIOGRAM REPORT: CPT | Mod: ,,, | Performed by: GENERAL PRACTICE

## 2024-01-29 PROCEDURE — 83880 ASSAY OF NATRIURETIC PEPTIDE: CPT | Performed by: STUDENT IN AN ORGANIZED HEALTH CARE EDUCATION/TRAINING PROGRAM

## 2024-01-29 PROCEDURE — 96372 THER/PROPH/DIAG INJ SC/IM: CPT

## 2024-01-29 PROCEDURE — 93005 ELECTROCARDIOGRAM TRACING: CPT | Performed by: GENERAL PRACTICE

## 2024-01-29 PROCEDURE — G0378 HOSPITAL OBSERVATION PER HR: HCPCS

## 2024-01-29 PROCEDURE — 25000003 PHARM REV CODE 250

## 2024-01-29 PROCEDURE — 84484 ASSAY OF TROPONIN QUANT: CPT | Mod: 91 | Performed by: STUDENT IN AN ORGANIZED HEALTH CARE EDUCATION/TRAINING PROGRAM

## 2024-01-29 PROCEDURE — 83735 ASSAY OF MAGNESIUM: CPT | Performed by: STUDENT IN AN ORGANIZED HEALTH CARE EDUCATION/TRAINING PROGRAM

## 2024-01-29 PROCEDURE — 85025 COMPLETE CBC W/AUTO DIFF WBC: CPT | Performed by: STUDENT IN AN ORGANIZED HEALTH CARE EDUCATION/TRAINING PROGRAM

## 2024-01-29 PROCEDURE — 83036 HEMOGLOBIN GLYCOSYLATED A1C: CPT | Performed by: INTERNAL MEDICINE

## 2024-01-29 PROCEDURE — 80053 COMPREHEN METABOLIC PANEL: CPT | Performed by: STUDENT IN AN ORGANIZED HEALTH CARE EDUCATION/TRAINING PROGRAM

## 2024-01-29 PROCEDURE — 99285 EMERGENCY DEPT VISIT HI MDM: CPT | Mod: 25

## 2024-01-29 PROCEDURE — 36415 COLL VENOUS BLD VENIPUNCTURE: CPT | Performed by: INTERNAL MEDICINE

## 2024-01-29 PROCEDURE — 85610 PROTHROMBIN TIME: CPT | Performed by: INTERNAL MEDICINE

## 2024-01-29 PROCEDURE — 63600175 PHARM REV CODE 636 W HCPCS

## 2024-01-29 RX ORDER — IBUPROFEN 200 MG
16 TABLET ORAL
Status: DISCONTINUED | OUTPATIENT
Start: 2024-01-29 | End: 2024-01-31 | Stop reason: HOSPADM

## 2024-01-29 RX ORDER — MINOXIDIL 2.5 MG/1
5 TABLET ORAL 2 TIMES DAILY
Status: DISCONTINUED | OUTPATIENT
Start: 2024-01-29 | End: 2024-01-31 | Stop reason: HOSPADM

## 2024-01-29 RX ORDER — DOXYCYCLINE 100 MG/1
100 CAPSULE ORAL 2 TIMES DAILY
Status: DISCONTINUED | OUTPATIENT
Start: 2024-01-29 | End: 2024-01-31 | Stop reason: HOSPADM

## 2024-01-29 RX ORDER — DOXYCYCLINE 100 MG/1
100 CAPSULE ORAL 2 TIMES DAILY
COMMUNITY
End: 2024-03-19 | Stop reason: SDUPTHER

## 2024-01-29 RX ORDER — CLOPIDOGREL BISULFATE 75 MG/1
75 TABLET ORAL DAILY
Status: DISCONTINUED | OUTPATIENT
Start: 2024-01-30 | End: 2024-01-31 | Stop reason: HOSPADM

## 2024-01-29 RX ORDER — HEPARIN SODIUM 5000 [USP'U]/ML
5000 INJECTION, SOLUTION INTRAVENOUS; SUBCUTANEOUS EVERY 8 HOURS
Status: DISCONTINUED | OUTPATIENT
Start: 2024-01-29 | End: 2024-01-31 | Stop reason: HOSPADM

## 2024-01-29 RX ORDER — ALBUTEROL SULFATE 90 UG/1
2 AEROSOL, METERED RESPIRATORY (INHALATION)
COMMUNITY
Start: 2023-09-26 | End: 2024-03-21

## 2024-01-29 RX ORDER — IBUPROFEN 200 MG
24 TABLET ORAL
Status: DISCONTINUED | OUTPATIENT
Start: 2024-01-29 | End: 2024-01-31 | Stop reason: HOSPADM

## 2024-01-29 RX ORDER — GLUCAGON 1 MG
1 KIT INJECTION
Status: DISCONTINUED | OUTPATIENT
Start: 2024-01-29 | End: 2024-01-31 | Stop reason: HOSPADM

## 2024-01-29 RX ORDER — ATORVASTATIN CALCIUM 40 MG/1
80 TABLET, FILM COATED ORAL NIGHTLY
Status: DISCONTINUED | OUTPATIENT
Start: 2024-01-29 | End: 2024-01-31 | Stop reason: HOSPADM

## 2024-01-29 RX ORDER — ONDANSETRON HYDROCHLORIDE 2 MG/ML
4 INJECTION, SOLUTION INTRAVENOUS EVERY 8 HOURS PRN
Status: DISCONTINUED | OUTPATIENT
Start: 2024-01-29 | End: 2024-01-31 | Stop reason: HOSPADM

## 2024-01-29 RX ORDER — PROMETHAZINE HYDROCHLORIDE 25 MG/1
25 TABLET ORAL EVERY 6 HOURS PRN
Status: DISCONTINUED | OUTPATIENT
Start: 2024-01-29 | End: 2024-01-31 | Stop reason: HOSPADM

## 2024-01-29 RX ORDER — AMLODIPINE BESYLATE 5 MG/1
5 TABLET ORAL DAILY
Status: DISCONTINUED | OUTPATIENT
Start: 2024-01-29 | End: 2024-01-31 | Stop reason: HOSPADM

## 2024-01-29 RX ORDER — TALC
6 POWDER (GRAM) TOPICAL NIGHTLY PRN
Status: DISCONTINUED | OUTPATIENT
Start: 2024-01-29 | End: 2024-01-31 | Stop reason: HOSPADM

## 2024-01-29 RX ORDER — GABAPENTIN 100 MG/1
100 CAPSULE ORAL 2 TIMES DAILY
Status: DISCONTINUED | OUTPATIENT
Start: 2024-01-29 | End: 2024-01-31 | Stop reason: HOSPADM

## 2024-01-29 RX ORDER — ACETAMINOPHEN 325 MG/1
650 TABLET ORAL EVERY 8 HOURS PRN
Status: DISCONTINUED | OUTPATIENT
Start: 2024-01-29 | End: 2024-01-31 | Stop reason: HOSPADM

## 2024-01-29 RX ORDER — PANTOPRAZOLE SODIUM 40 MG/1
40 TABLET, DELAYED RELEASE ORAL DAILY
Status: DISCONTINUED | OUTPATIENT
Start: 2024-01-30 | End: 2024-01-31 | Stop reason: HOSPADM

## 2024-01-29 RX ORDER — ASPIRIN 81 MG/1
81 TABLET ORAL DAILY
Status: DISCONTINUED | OUTPATIENT
Start: 2024-01-30 | End: 2024-01-31 | Stop reason: HOSPADM

## 2024-01-29 RX ORDER — CINACALCET 30 MG/1
120 TABLET, FILM COATED ORAL 2 TIMES DAILY WITH MEALS
Status: DISCONTINUED | OUTPATIENT
Start: 2024-01-29 | End: 2024-01-31 | Stop reason: HOSPADM

## 2024-01-29 RX ORDER — SODIUM CHLORIDE 0.9 % (FLUSH) 0.9 %
2 SYRINGE (ML) INJECTION
Status: DISCONTINUED | OUTPATIENT
Start: 2024-01-29 | End: 2024-01-31 | Stop reason: HOSPADM

## 2024-01-29 RX ORDER — INSULIN ASPART 100 [IU]/ML
0-5 INJECTION, SOLUTION INTRAVENOUS; SUBCUTANEOUS
Status: DISCONTINUED | OUTPATIENT
Start: 2024-01-29 | End: 2024-01-31 | Stop reason: HOSPADM

## 2024-01-29 RX ADMIN — DOXYCYCLINE HYCLATE 100 MG: 100 CAPSULE ORAL at 10:01

## 2024-01-29 RX ADMIN — ATORVASTATIN CALCIUM 80 MG: 40 TABLET, FILM COATED ORAL at 10:01

## 2024-01-29 RX ADMIN — CINACALCET 120 MG: 30 TABLET, FILM COATED ORAL at 11:01

## 2024-01-29 RX ADMIN — HEPARIN SODIUM 5000 UNITS: 5000 INJECTION, SOLUTION INTRAVENOUS; SUBCUTANEOUS at 10:01

## 2024-01-29 RX ADMIN — GABAPENTIN 100 MG: 100 CAPSULE ORAL at 10:01

## 2024-01-29 RX ADMIN — AMLODIPINE BESYLATE 5 MG: 5 TABLET ORAL at 10:01

## 2024-01-29 RX ADMIN — MINOXIDIL 5 MG: 2.5 TABLET ORAL at 11:01

## 2024-01-29 NOTE — SUBJECTIVE & OBJECTIVE
Past Medical History:   Diagnosis Date    A-fib     resolved per patient    Anemia due to end stage renal disease 6/30/2022    Arthritis     Bronchitis     Cardiovascular event risk, ASCVD 10-year risk 6.7% 10/15/2016    Diabetes mellitus type II     Diabetic foot infection     Diabetic ulcer of left midfoot 05/05/2022    Disorder of kidney and ureter     Encounter for blood transfusion     ESRD (end stage renal disease) 05/18/2018    MANJINDER (generalized anxiety disorder) 10/25/2016    History of colon polyps 11/02/2016    Hyperlipidemia     Hypertension     Stroke        Past Surgical History:   Procedure Laterality Date    ANGIOGRAPHY OF LOWER EXTREMITY Left 10/18/2022    Procedure: Angiogram Extremity Unilateral;  Surgeon: Ali Khoobehi, MD;  Location: [facility] CATH/EP LAB;  Service: Vascular;  Laterality: Left;    AV FISTULA PLACEMENT Left 8/29/2022    Procedure: CREATION, AV FISTULA;  Surgeon: Ali Khoobehi, MD;  Location: [facility] OR;  Service: Vascular;  Laterality: Left;    BONE BIOPSY Left 8/24/2022    Procedure: Biopsy-Bone;  Surgeon: Porfirio Ponce DPM;  Location: [facility] OR;  Service: Podiatry;  Laterality: Left;    COLONOSCOPY N/A 10/5/2016    Procedure: COLONOSCOPY;  Surgeon: Pillo Chanel MD;  Location: [facility] ENDO;  Service: Endoscopy;  Laterality: N/A;    COLONOSCOPY N/A 4/26/2022    Procedure: COLONOSCOPY;  Surgeon: Saravanan Rachel MD;  Location: [facility] ENDO;  Service: Endoscopy;  Laterality: N/A;    FISTULOGRAM Left 8/24/2022    Procedure: Fistulogram;  Surgeon: Ali Khoobehi, MD;  Location: [facility] CATH/EP LAB;  Service: Vascular;  Laterality: Left;    HERNIA REPAIR Bilateral 11/22/2016    inguinal    HYSTERECTOMY      INCISION AND DRAINAGE FOOT Left 5/13/2022    Procedure: INCISION AND DRAINAGE, FOOT;  Surgeon: Ricardo Bruno DPM;  Location: [facility] OR;  Service: Podiatry;  Laterality: Left;    OOPHORECTOMY      THROMBECTOMY, AV FISTULA, UPPER EXTREMITY, PERCUTANEOUS  8/24/2022    Procedure: THROMBECTOMY, AV  FISTULA, UPPER EXTREMITY, PERCUTANEOUS;  Surgeon: Ali Khoobehi, MD;  Location: [facility] CATH/EP LAB;  Service: Vascular;;    TOE AMPUTATION Left 8/26/2022    Procedure: AMPUTATION, TOE;  Surgeon: Porfirio Ponce DPM;  Location: [facility] OR;  Service: Podiatry;  Laterality: Left;       Review of patient's allergies indicates:   Allergen Reactions    Dilaudid [hydromorphone] Nausea And Vomiting    Chlorhexidine Itching    Lortab [hydrocodone-acetaminophen] Itching       No current facility-administered medications on file prior to encounter.     Current Outpatient Medications on File Prior to Encounter   Medication Sig    amLODIPine (NORVASC) 5 MG tablet Take 1 tablet by mouth once daily.    aspirin (ECOTRIN) 81 MG EC tablet Take 1 tablet (81 mg total) by mouth once daily.    atorvastatin (LIPITOR) 80 MG tablet Take 1 tablet (80 mg total) by mouth once daily.    cinacalcet (SENSIPAR) 60 MG Tab Take 2 tablets by mouth 2 (two) times daily with meals.    clopidogreL (PLAVIX) 75 mg tablet Take 1 tablet (75 mg total) by mouth once daily.    doxycycline (VIBRAMYCIN) 100 MG Cap Take 100 mg by mouth 2 (two) times daily.    gabapentin (NEURONTIN) 100 MG capsule Take 1 capsule (100 mg total) by mouth 2 (two) times daily.    minoxidiL (LONITEN) 2.5 MG tablet Take 2 tablets by mouth 2 (two) times daily.    multivitamin (THERAGRAN) per tablet Take 1 tablet by mouth once daily.    OZEMPIC 0.25 mg or 0.5 mg (2 mg/3 mL) pen injector INJECT 0.25 MG SUBCUTANEOUSLY ONCE A WEEK (Patient taking differently: Inject 0.25 mg into the skin every 7 days. INJECT 0.25 MG SUBCUTANEOUSLY ONCE A WEEK)    pantoprazole (PROTONIX) 40 MG tablet Take 1 tablet (40 mg total) by mouth once daily.    sucroferric oxyhydroxide (VELPHORO) 500 mg Chew Take 2 tablets by mouth 3 (three) times daily.    acetaminophen (TYLENOL) 325 MG tablet Take 325 mg by mouth every 6 (six) hours.    albuterol (PROVENTIL/VENTOLIN HFA) 90 mcg/actuation inhaler Inhale 2 puffs into the  "lungs every 4 to 6 hours as needed for Shortness of Breath or Wheezing.    ALPRAZolam (XANAX) 0.5 MG tablet Take 1 tablet by mouth.    blood sugar diagnostic Strp To check BG 4 times daily, to use with insurance preferred meter    blood-glucose meter kit To check BG 3 times daily, to use with insurance preferred meter    cetirizine (ZYRTEC) 10 MG tablet Take 1 tablet (10 mg total) by mouth every other day.    epoetin chris-epbx (RETACRIT) 4,000 unit/mL injection Inject 1.11 mLs (4,440 Units total) into the skin every Mon, Wed, Fri.    hydrALAZINE (APRESOLINE) 25 MG tablet Take 1 tablet (25 mg total) by mouth 2 (two) times daily as needed (Systolic blood pressure > 170 mm Hg). (Patient not taking: Reported on 1/25/2024)    lancets Misc To check BG 1 times daily, to use with insurance preferred meter    lancets Misc To check BG 3 times daily, to use with insurance preferred meter    nitroGLYCERIN (NITROSTAT) 0.4 MG SL tablet Place 1 tablet (0.4 mg total) under the tongue every 5 (five) minutes as needed for Chest pain. (Patient not taking: Reported on 1/25/2024)    pen needle, diabetic (BD ULTRA-FINE ROBERT PEN NEEDLE) 32 gauge x 5/32" Ndle 1 pen by Misc.(Non-Drug; Combo Route) route 4 (four) times daily. To use 4 times per day with insulin injections.    [DISCONTINUED] clorazepate (TRANXENE) 3.75 MG Tab Take 7.5 mg by mouth nightly as needed.     [DISCONTINUED] dextrose (GLUCOSE GEL) 40 % gel Take 37.5 mLs (15,000 mg total) by mouth once as needed (hypoglycemia).    [DISCONTINUED] ezetimibe (ZETIA) 10 mg tablet Take 1 tablet (10 mg total) by mouth once daily.    [DISCONTINUED] fenofibrate 160 MG Tab Take 1 tablet (160 mg total) by mouth once daily.    [DISCONTINUED] lancing device with lancets (ACCU-CHEK SOFT DEV LANCETS) Kit To check blood sugars 4 times per day    [DISCONTINUED] pantoprazole (PROTONIX) 40 mg injection Inject 40 mg into the vein every 12 (twelve) hours. (Patient not taking: Reported on 1/25/2024) "     Family History       Problem Relation (Age of Onset)    Cancer Paternal Grandmother    Diabetes Father, Sister, Sister, Maternal Aunt, Maternal Grandmother    Heart disease Maternal Grandmother    Hyperlipidemia Mother    Hypertension Mother, Father          Tobacco Use    Smoking status: Never    Smokeless tobacco: Never   Substance and Sexual Activity    Alcohol use: No    Drug use: No    Sexual activity: Yes     Partners: Male     Review of Systems   Constitutional:  Positive for fatigue. Negative for activity change, appetite change, chills, diaphoresis and fever.   HENT:  Negative for congestion, ear discharge, ear pain, postnasal drip, rhinorrhea, sinus pressure and sore throat.    Eyes:  Negative for pain, discharge, redness and itching.   Respiratory:  Negative for cough, chest tightness and shortness of breath.    Cardiovascular:  Negative for chest pain and leg swelling.   Gastrointestinal:  Negative for abdominal pain, constipation, diarrhea, nausea and vomiting.   Genitourinary:  Negative for dysuria, frequency, hematuria and urgency.   Musculoskeletal:  Negative for back pain.   Skin:  Positive for wound (chronic foot ulcer that she is currently getting wound care for). Negative for color change, pallor and rash.   Neurological:  Positive for syncope. Negative for dizziness, facial asymmetry, weakness and light-headedness.   Psychiatric/Behavioral:  Negative for behavioral problems, confusion and hallucinations.      Objective:     Vital Signs (Most Recent):  Temp: 97.7 °F (36.5 °C) (01/29/24 1614)  Pulse: 78 (01/29/24 1614)  Resp: 18 (01/29/24 1614)  BP: (!) 198/81 (01/29/24 1614)  SpO2: 99 % (01/29/24 1614) Vital Signs (24h Range):  Temp:  [97.7 °F (36.5 °C)] 97.7 °F (36.5 °C)  Pulse:  [78-83] 78  Resp:  [18-20] 18  SpO2:  [98 %-99 %] 99 %  BP: (171-198)/(74-81) 198/81     Weight: 79.4 kg (175 lb)  Body mass index is 27.41 kg/m².     Physical Exam  Vitals and nursing note reviewed.    Constitutional:       General: She is not in acute distress.     Appearance: Normal appearance. She is not ill-appearing, toxic-appearing or diaphoretic.   HENT:      Head: Normocephalic and atraumatic.      Nose: Nose normal.      Mouth/Throat:      Mouth: Mucous membranes are moist.   Eyes:      Extraocular Movements: Extraocular movements intact.   Cardiovascular:      Rate and Rhythm: Normal rate and regular rhythm.      Heart sounds: Normal heart sounds. No murmur heard.  Pulmonary:      Effort: Pulmonary effort is normal. No respiratory distress.      Breath sounds: Normal breath sounds. No wheezing.   Chest:      Comments: Left chest wall port with no erythema  Abdominal:      General: Abdomen is flat. Bowel sounds are normal.      Palpations: Abdomen is soft. There is no mass.      Tenderness: There is no abdominal tenderness. There is no guarding.      Hernia: No hernia is present.   Musculoskeletal:         General: No swelling, tenderness or deformity. Normal range of motion.      Cervical back: Normal range of motion. No rigidity or tenderness.      Comments: Patient has LUE bandage and left foot bandaged   Skin:     General: Skin is warm and dry.      Coloration: Skin is not jaundiced.      Findings: No bruising or erythema.   Neurological:      General: No focal deficit present.      Mental Status: She is alert and oriented to person, place, and time. Mental status is at baseline.   Psychiatric:         Mood and Affect: Mood normal.         Behavior: Behavior normal.                Significant Labs: All pertinent labs within the past 24 hours have been reviewed.  CBC:   Recent Labs   Lab 01/29/24  1454   WBC 7.98   HGB 11.2*   HCT 33.5*        CMP:   Recent Labs   Lab 01/29/24  1454      K 4.4   CL 98   CO2 25   *   BUN 57*   CREATININE 4.6*   CALCIUM 9.5   PROT 7.0   ALBUMIN 3.8   BILITOT 0.3   ALKPHOS 96   AST 19   ALT 22   ANIONGAP 13     Cardiac Markers:   Recent Labs   Lab  01/29/24  1454   *     Magnesium:   Recent Labs   Lab 01/29/24  1454   MG 2.1     Troponin:   Recent Labs   Lab 01/29/24  1454   TROPONINIHS 26.0*       Significant Imaging: I have reviewed all pertinent imaging results/findings within the past 24 hours.

## 2024-01-29 NOTE — Clinical Note
The site was marked. The left brachial was prepped. The site was prepped with Betadine. The site was clipped. The patient was draped.

## 2024-01-29 NOTE — H&P
[facility] - Emergency Dept  Hospital Medicine  History & Physical    Patient Name: Leanna García  MRN: [id number]  Patient Class: OP- Observation  Admission Date: 1/29/2024  Attending Physician:  Dr. Chapman  Primary Care Provider: Stefano Lopez MD         Patient information was obtained from patient and ER records.     Subjective:     Principal Problem:Syncope    Chief Complaint:   Chief Complaint   Patient presents with    Loss of Consciousness     Syncopal episode while at dialysis. Pt finished 2.5 hour of 3 hr dialysis. Goes to Dialysis Monday/Wednesday/Friday. Pt complains of feeling tired but states she always feels like this after dialysis. No further complaints. Denies any chest pain or SOB         HPI: 59 y/o female with history of ESRD on HD Monday/Wednesday/Friday, DM, HTN, atrial fibrillation, MANJINDER, HLD, and chronic diastolic HF presents to the ED for a syncopal episode while at dialysis today. Patient states that she woke up this morning feeling completely normal and went to HD. Patient states that after 2.5 hours of HD she lost consciousness for a short time and was told that her LUE was shaking. Patient denies any prodrome and states that she does not remember the episode. When patient regained consciousness she states that she just felt fatigued like she typically does after dialysis. Patient was recently in the hospital for a cardiac workup after having chest pain and has established care with Dr. STEVE Adame for cardiology. Patient denies any chest pain, SOB, abdominal pain, nausea, vomiting, diarrhea, focal weakness, or dizziness. Code status was discussed and patient was placed as a full code at this time.     ED: Vitals showed HR 83, /74, RR 20 saturating at 98% on RA. Labs were significant for glucose 207, troponin 26, , and magnesium 2.1. CT head showed no acute changes.     Past Medical History:   Diagnosis Date    A-fib     resolved per patient     Anemia due to end stage renal disease 6/30/2022    Arthritis     Bronchitis     Cardiovascular event risk, ASCVD 10-year risk 6.7% 10/15/2016    Diabetes mellitus type II     Diabetic foot infection     Diabetic ulcer of left midfoot 05/05/2022    Disorder of kidney and ureter     Encounter for blood transfusion     ESRD (end stage renal disease) 05/18/2018    MANJINDER (generalized anxiety disorder) 10/25/2016    History of colon polyps 11/02/2016    Hyperlipidemia     Hypertension     Stroke        Past Surgical History:   Procedure Laterality Date    ANGIOGRAPHY OF LOWER EXTREMITY Left 10/18/2022    Procedure: Angiogram Extremity Unilateral;  Surgeon: Ali Khoobehi, MD;  Location: [facility] CATH/EP LAB;  Service: Vascular;  Laterality: Left;    AV FISTULA PLACEMENT Left 8/29/2022    Procedure: CREATION, AV FISTULA;  Surgeon: Ali Khoobehi, MD;  Location: [facility] OR;  Service: Vascular;  Laterality: Left;    BONE BIOPSY Left 8/24/2022    Procedure: Biopsy-Bone;  Surgeon: Porfirio Ponce DPM;  Location: [facility] OR;  Service: Podiatry;  Laterality: Left;    COLONOSCOPY N/A 10/5/2016    Procedure: COLONOSCOPY;  Surgeon: Pillo Chanel MD;  Location: [facility] ENDO;  Service: Endoscopy;  Laterality: N/A;    COLONOSCOPY N/A 4/26/2022    Procedure: COLONOSCOPY;  Surgeon: Saravanan Rachel MD;  Location: [facility] ENDO;  Service: Endoscopy;  Laterality: N/A;    FISTULOGRAM Left 8/24/2022    Procedure: Fistulogram;  Surgeon: Ali Khoobehi, MD;  Location: [facility] CATH/EP LAB;  Service: Vascular;  Laterality: Left;    HERNIA REPAIR Bilateral 11/22/2016    inguinal    HYSTERECTOMY      INCISION AND DRAINAGE FOOT Left 5/13/2022    Procedure: INCISION AND DRAINAGE, FOOT;  Surgeon: Ricardo Bruno DPM;  Location: [facility] OR;  Service: Podiatry;  Laterality: Left;    OOPHORECTOMY      THROMBECTOMY, AV FISTULA, UPPER EXTREMITY, PERCUTANEOUS  8/24/2022    Procedure: THROMBECTOMY, AV FISTULA, UPPER EXTREMITY, PERCUTANEOUS;  Surgeon: Ali Khoobehi, MD;  Location:  [de-identified] : Left Knee:  Range of Motion in Degrees      Claimant: Normal:  Flexion Active   135   135-degrees  Flexion Passive  135  135-degrees  Extension Active  0-5   0-5-degrees  Extension Passive  0-5   0-5-degrees   No weakness to flexion/extension. No evidence of instability in the AP plane or varus or valgus stress. Negative Lachman. Negative pivot shift. Negative anterior drawer test. Negative posterior drawer test. Negative Cathy. Negative Apley grind. No medial or lateral joint line tenderness. Positive tenderness over the medial and lateral facet of the patella. Positive patellofemoral crepitations. No lateral tilting patella. No patella apprehension. Positive crepitation in the medial and lateral femoral condyle. No proximal or distal swelling, edema or tenderness. No gross motor or sensory deficits. Mild intra-articular swelling. 2+ DP and PT pulses. No varus or valgus malalignment. Skin is intact. No rashes, scars or lesions. [facility] CATH/EP LAB;  Service: Vascular;;    TOE AMPUTATION Left 8/26/2022    Procedure: AMPUTATION, TOE;  Surgeon: Porfirio Ponce DPM;  Location: [facility] OR;  Service: Podiatry;  Laterality: Left;       Review of patient's allergies indicates:   Allergen Reactions    Dilaudid [hydromorphone] Nausea And Vomiting    Chlorhexidine Itching    Lortab [hydrocodone-acetaminophen] Itching       No current facility-administered medications on file prior to encounter.     Current Outpatient Medications on File Prior to Encounter   Medication Sig    amLODIPine (NORVASC) 5 MG tablet Take 1 tablet by mouth once daily.    aspirin (ECOTRIN) 81 MG EC tablet Take 1 tablet (81 mg total) by mouth once daily.    atorvastatin (LIPITOR) 80 MG tablet Take 1 tablet (80 mg total) by mouth once daily.    cinacalcet (SENSIPAR) 60 MG Tab Take 2 tablets by mouth 2 (two) times daily with meals.    clopidogreL (PLAVIX) 75 mg tablet Take 1 tablet (75 mg total) by mouth once daily.    doxycycline (VIBRAMYCIN) 100 MG Cap Take 100 mg by mouth 2 (two) times daily.    gabapentin (NEURONTIN) 100 MG capsule Take 1 capsule (100 mg total) by mouth 2 (two) times daily.    minoxidiL (LONITEN) 2.5 MG tablet Take 2 tablets by mouth 2 (two) times daily.    multivitamin (THERAGRAN) per tablet Take 1 tablet by mouth once daily.    OZEMPIC 0.25 mg or 0.5 mg (2 mg/3 mL) pen injector INJECT 0.25 MG SUBCUTANEOUSLY ONCE A WEEK (Patient taking differently: Inject 0.25 mg into the skin every 7 days. INJECT 0.25 MG SUBCUTANEOUSLY ONCE A WEEK)    pantoprazole (PROTONIX) 40 MG tablet Take 1 tablet (40 mg total) by mouth once daily.    sucroferric oxyhydroxide (VELPHORO) 500 mg Chew Take 2 tablets by mouth 3 (three) times daily.    acetaminophen (TYLENOL) 325 MG tablet Take 325 mg by mouth every 6 (six) hours.    albuterol (PROVENTIL/VENTOLIN HFA) 90 mcg/actuation inhaler Inhale 2 puffs into the lungs every 4 to 6 hours as needed for Shortness of Breath or Wheezing.     "ALPRAZolam (XANAX) 0.5 MG tablet Take 1 tablet by mouth.    blood sugar diagnostic Strp To check BG 4 times daily, to use with insurance preferred meter    blood-glucose meter kit To check BG 3 times daily, to use with insurance preferred meter    cetirizine (ZYRTEC) 10 MG tablet Take 1 tablet (10 mg total) by mouth every other day.    epoetin chris-epbx (RETACRIT) 4,000 unit/mL injection Inject 1.11 mLs (4,440 Units total) into the skin every Mon, Wed, Fri.    hydrALAZINE (APRESOLINE) 25 MG tablet Take 1 tablet (25 mg total) by mouth 2 (two) times daily as needed (Systolic blood pressure > 170 mm Hg). (Patient not taking: Reported on 1/25/2024)    lancets Misc To check BG 1 times daily, to use with insurance preferred meter    lancets Misc To check BG 3 times daily, to use with insurance preferred meter    nitroGLYCERIN (NITROSTAT) 0.4 MG SL tablet Place 1 tablet (0.4 mg total) under the tongue every 5 (five) minutes as needed for Chest pain. (Patient not taking: Reported on 1/25/2024)    pen needle, diabetic (BD ULTRA-FINE ROBERT PEN NEEDLE) 32 gauge x 5/32" Ndle 1 pen by Misc.(Non-Drug; Combo Route) route 4 (four) times daily. To use 4 times per day with insulin injections.    [DISCONTINUED] clorazepate (TRANXENE) 3.75 MG Tab Take 7.5 mg by mouth nightly as needed.     [DISCONTINUED] dextrose (GLUCOSE GEL) 40 % gel Take 37.5 mLs (15,000 mg total) by mouth once as needed (hypoglycemia).    [DISCONTINUED] ezetimibe (ZETIA) 10 mg tablet Take 1 tablet (10 mg total) by mouth once daily.    [DISCONTINUED] fenofibrate 160 MG Tab Take 1 tablet (160 mg total) by mouth once daily.    [DISCONTINUED] lancing device with lancets (ACCU-CHEK SOFT DEV LANCETS) Kit To check blood sugars 4 times per day    [DISCONTINUED] pantoprazole (PROTONIX) 40 mg injection Inject 40 mg into the vein every 12 (twelve) hours. (Patient not taking: Reported on 1/25/2024)     Family History       Problem Relation (Age of Onset)    Cancer Paternal " Grandmother    Diabetes Father, Sister, Sister, Maternal Aunt, Maternal Grandmother    Heart disease Maternal Grandmother    Hyperlipidemia Mother    Hypertension Mother, Father          Tobacco Use    Smoking status: Never    Smokeless tobacco: Never   Substance and Sexual Activity    Alcohol use: No    Drug use: No    Sexual activity: Yes     Partners: Male     Review of Systems   Constitutional:  Positive for fatigue. Negative for activity change, appetite change, chills, diaphoresis and fever.   HENT:  Negative for congestion, ear discharge, ear pain, postnasal drip, rhinorrhea, sinus pressure and sore throat.    Eyes:  Negative for pain, discharge, redness and itching.   Respiratory:  Negative for cough, chest tightness and shortness of breath.    Cardiovascular:  Negative for chest pain and leg swelling.   Gastrointestinal:  Negative for abdominal pain, constipation, diarrhea, nausea and vomiting.   Genitourinary:  Negative for dysuria, frequency, hematuria and urgency.   Musculoskeletal:  Negative for back pain.   Skin:  Positive for wound (chronic foot ulcer that she is currently getting wound care for). Negative for color change, pallor and rash.   Neurological:  Positive for syncope. Negative for dizziness, facial asymmetry, weakness and light-headedness.   Psychiatric/Behavioral:  Negative for behavioral problems, confusion and hallucinations.      Objective:     Vital Signs (Most Recent):  Temp: 97.7 °F (36.5 °C) (01/29/24 1614)  Pulse: 78 (01/29/24 1614)  Resp: 18 (01/29/24 1614)  BP: (!) 198/81 (01/29/24 1614)  SpO2: 99 % (01/29/24 1614) Vital Signs (24h Range):  Temp:  [97.7 °F (36.5 °C)] 97.7 °F (36.5 °C)  Pulse:  [78-83] 78  Resp:  [18-20] 18  SpO2:  [98 %-99 %] 99 %  BP: (171-198)/(74-81) 198/81     Weight: 79.4 kg (175 lb)  Body mass index is 27.41 kg/m².     Physical Exam  Vitals and nursing note reviewed.   Constitutional:       General: She is not in acute distress.     Appearance: Normal  appearance. She is not ill-appearing, toxic-appearing or diaphoretic.   HENT:      Head: Normocephalic and atraumatic.      Nose: Nose normal.      Mouth/Throat:      Mouth: Mucous membranes are moist.   Eyes:      Extraocular Movements: Extraocular movements intact.   Cardiovascular:      Rate and Rhythm: Normal rate and regular rhythm.      Heart sounds: Normal heart sounds. No murmur heard.  Pulmonary:      Effort: Pulmonary effort is normal. No respiratory distress.      Breath sounds: Normal breath sounds. No wheezing.   Chest:      Comments: Left chest wall port with no erythema  Abdominal:      General: Abdomen is flat. Bowel sounds are normal.      Palpations: Abdomen is soft. There is no mass.      Tenderness: There is no abdominal tenderness. There is no guarding.      Hernia: No hernia is present.   Musculoskeletal:         General: No swelling, tenderness or deformity. Normal range of motion.      Cervical back: Normal range of motion. No rigidity or tenderness.      Comments: Patient has LUE bandage and left foot bandaged   Skin:     General: Skin is warm and dry.      Coloration: Skin is not jaundiced.      Findings: No bruising or erythema.   Neurological:      General: No focal deficit present.      Mental Status: She is alert and oriented to person, place, and time. Mental status is at baseline.   Psychiatric:         Mood and Affect: Mood normal.         Behavior: Behavior normal.                Significant Labs: All pertinent labs within the past 24 hours have been reviewed.  CBC:   Recent Labs   Lab 01/29/24  1454   WBC 7.98   HGB 11.2*   HCT 33.5*        CMP:   Recent Labs   Lab 01/29/24  1454      K 4.4   CL 98   CO2 25   *   BUN 57*   CREATININE 4.6*   CALCIUM 9.5   PROT 7.0   ALBUMIN 3.8   BILITOT 0.3   ALKPHOS 96   AST 19   ALT 22   ANIONGAP 13     Cardiac Markers:   Recent Labs   Lab 01/29/24  1454   *     Magnesium:   Recent Labs   Lab 01/29/24  1454   MG 2.1      Troponin:   Recent Labs   Lab 01/29/24  1454   TROPONINIHS 26.0*       Significant Imaging: I have reviewed all pertinent imaging results/findings within the past 24 hours.  Assessment/Plan:     * Syncope  Patient presented to the ED after having a syncopal episode while getting her HD today  Patient had no prodrome and does not recall the episode  Patient states that she just feels fatigued now     Troponin 26  - repeat pending    ECG: NSR with left anterior fascicular block    Patient has chronic, stable anemia of chronic disease  Coagulation studies ordered   Orthostatic vitals ordered     CT head: No acute intracranial abnormalities. Chronic findings as discussed above.     Carotid US ordered   ECHO done 1/22/2024    Left Ventricle: The left ventricle is normal in size. Mildly increased wall thickness. There is mild concentric hypertrophy. Normal wall motion. There is normal systolic function with a visually estimated ejection fraction of 60 - 65%. There is normal diastolic function.    Right Ventricle: Normal right ventricular cavity size. Wall thickness is normal. Right ventricle wall motion  is normal. Systolic function is normal.    Aortic Valve: There is mild aortic valve sclerosis.    Mitral Valve: There is mild regurgitation with a centrally directed jet.    IVC/SVC: Normal venous pressure at 3 mmHg.    Pericardium: There is a trivial posterior effusion. No indication of cardiac tamponade.    Fall precautions in place  Seizure precautions in place    Cardiology consulted     Diabetic ulcer of left midfoot associated with type 2 diabetes mellitus  Patient has chronic left plantar medial foot and left medial 3rd toe diabetic foot ulcers  Patient follows outpatient with Dr. Bruno for wound care     Wound care consulted     Type 2 diabetes mellitus  Patient started on diabetic diet   Last A1c (6/6/2023) 8.3  Hold Oral hypoglycemics while patient is in the hospital.  Sliding scale insulin  ordered  Continue to monitor blood glucose with POC glucose checks     ESRD (end stage renal disease)  Patient has ESRD and gets HD Monday/Wednesday/Friday   Patient completed 2.5 hours out of 3 hours of dialysis today     Patient on renal diet  Continue phospate binders    Nephrology consulted     Chronic diastolic heart failure  Patient has chronic diastolic heart failure   Last ECHO (1/22/2024)    Left Ventricle: The left ventricle is normal in size. Mildly increased wall thickness. There is mild concentric hypertrophy. Normal wall motion. There is normal systolic function with a visually estimated ejection fraction of 60 - 65%. There is normal diastolic function.    Right Ventricle: Normal right ventricular cavity size. Wall thickness is normal. Right ventricle wall motion  is normal. Systolic function is normal.    Aortic Valve: There is mild aortic valve sclerosis.    Mitral Valve: There is mild regurgitation with a centrally directed jet.    IVC/SVC: Normal venous pressure at 3 mmHg.    Pericardium: There is a trivial posterior effusion. No indication of cardiac tamponade.    Monitor intake and output  Monitor daily weights   BNP today 264  Cardiology consulted        Hypertension associated with diabetes  Continue home regimen as tolerated   Continue to monitor vitals       Hyperlipidemia associated with type 2 diabetes mellitus  Continue home regimen         VTE Risk Mitigation (From admission, onward)           Ordered     heparin (porcine) injection 5,000 Units  Every 8 hours         01/29/24 1657     IP VTE HIGH RISK PATIENT  Once         01/29/24 1657     Place sequential compression device  Until discontinued         01/29/24 1657                         On 01/29/2024, patient should be placed in hospital observation services under my care in collaboration with Dr. Chapman.           Nicole Benjamin PA-C  Department of Hospital Medicine  [facility] - Emergency Dept

## 2024-01-29 NOTE — ASSESSMENT & PLAN NOTE
Patient has chronic left plantar medial foot and left medial 3rd toe diabetic foot ulcers  Patient follows outpatient with Dr. Bruno for wound care     Wound care consulted

## 2024-01-29 NOTE — ASSESSMENT & PLAN NOTE
Patient has ESRD and gets HD Monday/Wednesday/Friday   Patient completed 2.5 hours out of 3 hours of dialysis today     Patient on renal diet  Continue phospate binders    Nephrology consulted

## 2024-01-29 NOTE — ASSESSMENT & PLAN NOTE
Patient presented to the ED after having a syncopal episode while getting her HD today  Patient had no prodrome and does not recall the episode  Patient states that she just feels fatigued now     Troponin 26  - repeat pending    ECG: NSR with left anterior fascicular block    Patient has chronic, stable anemia of chronic disease  Coagulation studies ordered   Orthostatic vitals ordered     CT head: No acute intracranial abnormalities. Chronic findings as discussed above.     Carotid US ordered   ECHO done 1/22/2024    Left Ventricle: The left ventricle is normal in size. Mildly increased wall thickness. There is mild concentric hypertrophy. Normal wall motion. There is normal systolic function with a visually estimated ejection fraction of 60 - 65%. There is normal diastolic function.    Right Ventricle: Normal right ventricular cavity size. Wall thickness is normal. Right ventricle wall motion  is normal. Systolic function is normal.    Aortic Valve: There is mild aortic valve sclerosis.    Mitral Valve: There is mild regurgitation with a centrally directed jet.    IVC/SVC: Normal venous pressure at 3 mmHg.    Pericardium: There is a trivial posterior effusion. No indication of cardiac tamponade.    Fall precautions in place  Seizure precautions in place    Cardiology consulted

## 2024-01-29 NOTE — HPI
57 y/o female with history of ESRD on HD Monday/Wednesday/Friday, DM, HTN, atrial fibrillation, MANJINDER, HLD, and chronic diastolic HF presents to the ED for a syncopal episode while at dialysis today. Patient states that she woke up this morning feeling completely normal and went to HD. Patient states that after 2.5 hours of HD she lost consciousness for a short time and was told that her LUE was shaking. Patient denies any prodrome and states that she does not remember the episode. When patient regained consciousness she states that she just felt fatigued like she typically does after dialysis. Patient was recently in the hospital for a cardiac workup after having chest pain and has established care with Dr. STEVE Adame for cardiology. Patient denies any chest pain, SOB, abdominal pain, nausea, vomiting, diarrhea, focal weakness, or dizziness. Code status was discussed and patient was placed as a full code at this time.     ED: Vitals showed HR 83, /74, RR 20 saturating at 98% on RA. Labs were significant for glucose 207, troponin 26, , and magnesium 2.1. CT head showed no acute changes.

## 2024-01-29 NOTE — CONSULTS
INPATIENT NEPHROLOGY CONSULT   [facility]    Patient Name: Leanna García  Date: 01/29/2024    Reason for consultation: ESRD    Chief Complaint:   Chief Complaint   Patient presents with    Loss of Consciousness     Syncopal episode while at dialysis. Pt finished 2.5 hour of 3 hr dialysis. Goes to Dialysis Monday/Wednesday/Friday. Pt complains of feeling tired but states she always feels like this after dialysis. No further complaints. Denies any chest pain or SOB        History of Present Illness:  Our ESRD patient on HD MWF who came from dialysis after developing syncopal episode 2.5 hours into HD- unresponsive- doesn't remember much- says something similar happened 1 week ago- no exac/reliev factors. Consulted for dialysis.    Interval History:  1/29- comfortable appearing, trop improved from prior, BNP stable from prior, echo and nuclear stress test last week neg, CT head neg, CXR clear    Plan of Care:    Assessment:  Syncope  ESRD on HD MWF  HTN  SHPT  Anemia of CKD    Plan:    - syncope w/u per HM- don't see recent carotid duplex  - no acute RRT needs today- continue HD MWF  - resume home BP meds- renal diet, 1.5L fluid restriction  - resume binders with meals  - will give FERMÍN with routine HD    Thank you for allowing us to participate in this patient's care. We will continue to follow.    Vital Signs:  Temp Readings from Last 3 Encounters:   01/29/24 97.7 °F (36.5 °C) (Oral)   01/24/24 98.1 °F (36.7 °C)   01/16/24 98.5 °F (36.9 °C)       Pulse Readings from Last 3 Encounters:   01/29/24 78   01/24/24 77   01/16/24 88       BP Readings from Last 3 Encounters:   01/29/24 (!) 198/81   01/24/24 (!) 123/55   01/16/24 (!) 190/108       Weight:  Wt Readings from Last 3 Encounters:   01/29/24 79.4 kg (175 lb)   01/22/24 78.7 kg (173 lb 8 oz)   01/23/24 78.7 kg (173 lb 8 oz)       Past Medical & Surgical History:  Past Medical History:   Diagnosis Date    A-fib     resolved per patient     Anemia due to end stage renal disease 6/30/2022    Arthritis     Bronchitis     Cardiovascular event risk, ASCVD 10-year risk 6.7% 10/15/2016    Diabetes mellitus type II     Diabetic foot infection     Diabetic ulcer of left midfoot 05/05/2022    Disorder of kidney and ureter     Encounter for blood transfusion     ESRD (end stage renal disease) 05/18/2018    MANJINDER (generalized anxiety disorder) 10/25/2016    History of colon polyps 11/02/2016    Hyperlipidemia     Hypertension     Stroke        Past Surgical History:   Procedure Laterality Date    ANGIOGRAPHY OF LOWER EXTREMITY Left 10/18/2022    Procedure: Angiogram Extremity Unilateral;  Surgeon: Ali Khoobehi, MD;  Location: [facility] CATH/EP LAB;  Service: Vascular;  Laterality: Left;    AV FISTULA PLACEMENT Left 8/29/2022    Procedure: CREATION, AV FISTULA;  Surgeon: Ali Khoobehi, MD;  Location: [facility] OR;  Service: Vascular;  Laterality: Left;    BONE BIOPSY Left 8/24/2022    Procedure: Biopsy-Bone;  Surgeon: Porfirio Ponce DPM;  Location: [facility] OR;  Service: Podiatry;  Laterality: Left;    COLONOSCOPY N/A 10/5/2016    Procedure: COLONOSCOPY;  Surgeon: Pillo Chanel MD;  Location: [facility] ENDO;  Service: Endoscopy;  Laterality: N/A;    COLONOSCOPY N/A 4/26/2022    Procedure: COLONOSCOPY;  Surgeon: Saravanan Rachel MD;  Location: [facility] ENDO;  Service: Endoscopy;  Laterality: N/A;    FISTULOGRAM Left 8/24/2022    Procedure: Fistulogram;  Surgeon: Ali Khoobehi, MD;  Location: [facility] CATH/EP LAB;  Service: Vascular;  Laterality: Left;    HERNIA REPAIR Bilateral 11/22/2016    inguinal    HYSTERECTOMY      INCISION AND DRAINAGE FOOT Left 5/13/2022    Procedure: INCISION AND DRAINAGE, FOOT;  Surgeon: Ricardo Bruno DPM;  Location: [facility] OR;  Service: Podiatry;  Laterality: Left;    OOPHORECTOMY      THROMBECTOMY, AV FISTULA, UPPER EXTREMITY, PERCUTANEOUS  8/24/2022    Procedure: THROMBECTOMY, AV FISTULA, UPPER EXTREMITY, PERCUTANEOUS;  Surgeon: Ali Khoobehi, MD;  Location:  [facility] CATH/EP LAB;  Service: Vascular;;    TOE AMPUTATION Left 8/26/2022    Procedure: AMPUTATION, TOE;  Surgeon: Porfirio Ponce DPM;  Location: [facility] OR;  Service: Podiatry;  Laterality: Left;       Past Social History:  Social History     Socioeconomic History    Marital status:    Tobacco Use    Smoking status: Never    Smokeless tobacco: Never   Substance and Sexual Activity    Alcohol use: No    Drug use: No    Sexual activity: Yes     Partners: Male   Social History Narrative    Caregiver      Social Determinants of Health     Financial Resource Strain: Low Risk  (11/3/2022)    Overall Financial Resource Strain (CARDIA)     Difficulty of Paying Living Expenses: Not hard at all   Food Insecurity: No Food Insecurity (11/3/2022)    Hunger Vital Sign     Worried About Running Out of Food in the Last Year: Never true     Ran Out of Food in the Last Year: Never true   Transportation Needs: No Transportation Needs (11/3/2022)    PRAPARE - Transportation     Lack of Transportation (Medical): No     Lack of Transportation (Non-Medical): No   Physical Activity: Insufficiently Active (11/3/2022)    Exercise Vital Sign     Days of Exercise per Week: 2 days     Minutes of Exercise per Session: 60 min   Stress: No Stress Concern Present (11/3/2022)    [place] [employer] - Occupational Stress Questionnaire     Feeling of Stress : Not at all   Social Connections: Moderately Isolated (11/3/2022)    Social Connection and Isolation Panel [NHANES]     Frequency of Communication with Friends and Family: More than three times a week     Frequency of Social Gatherings with Friends and Family: More than three times a week     Attends Restorationist Services: Never     Active Member of Clubs or Organizations: No     Attends Club or Organization Meetings: Never     Marital Status:    Housing Stability: Unknown (11/3/2022)    Housing Stability Vital Sign     Unable to Pay for Housing in the Last  Year: No     Unstable Housing in the Last Year: No       Medications:  Scheduled Meds:  Continuous Infusions:  PRN Meds:.  No current facility-administered medications on file prior to encounter.     Current Outpatient Medications on File Prior to Encounter   Medication Sig Dispense Refill    amLODIPine (NORVASC) 5 MG tablet Take 1 tablet by mouth once daily.      aspirin (ECOTRIN) 81 MG EC tablet Take 1 tablet (81 mg total) by mouth once daily.  0    atorvastatin (LIPITOR) 80 MG tablet Take 1 tablet (80 mg total) by mouth once daily. 90 tablet 3    cinacalcet (SENSIPAR) 60 MG Tab Take 2 tablets by mouth 2 (two) times daily with meals.      clopidogreL (PLAVIX) 75 mg tablet Take 1 tablet (75 mg total) by mouth once daily. 90 tablet 3    doxycycline (VIBRAMYCIN) 100 MG Cap Take 100 mg by mouth 2 (two) times daily.      gabapentin (NEURONTIN) 100 MG capsule Take 1 capsule (100 mg total) by mouth 2 (two) times daily. 180 capsule 3    minoxidiL (LONITEN) 2.5 MG tablet Take 2 tablets by mouth 2 (two) times daily.      multivitamin (THERAGRAN) per tablet Take 1 tablet by mouth once daily.      OZEMPIC 0.25 mg or 0.5 mg (2 mg/3 mL) pen injector INJECT 0.25 MG SUBCUTANEOUSLY ONCE A WEEK (Patient taking differently: Inject 0.25 mg into the skin every 7 days. INJECT 0.25 MG SUBCUTANEOUSLY ONCE A WEEK) 1.5 mL 2    pantoprazole (PROTONIX) 40 MG tablet Take 1 tablet (40 mg total) by mouth once daily. 30 tablet 2    sucroferric oxyhydroxide (VELPHORO) 500 mg Chew Take 2 tablets by mouth 3 (three) times daily.      acetaminophen (TYLENOL) 325 MG tablet Take 325 mg by mouth every 6 (six) hours.      albuterol (PROVENTIL/VENTOLIN HFA) 90 mcg/actuation inhaler Inhale 2 puffs into the lungs every 4 to 6 hours as needed for Shortness of Breath or Wheezing.      ALPRAZolam (XANAX) 0.5 MG tablet Take 1 tablet by mouth.      blood sugar diagnostic Strp To check BG 4 times daily, to use with insurance preferred meter 450 strip 3     "blood-glucose meter kit To check BG 3 times daily, to use with insurance preferred meter 1 each 0    cetirizine (ZYRTEC) 10 MG tablet Take 1 tablet (10 mg total) by mouth every other day. 45 tablet 3    epoetin chris-epbx (RETACRIT) 4,000 unit/mL injection Inject 1.11 mLs (4,440 Units total) into the skin every Mon, Wed, Fri.      hydrALAZINE (APRESOLINE) 25 MG tablet Take 1 tablet (25 mg total) by mouth 2 (two) times daily as needed (Systolic blood pressure > 170 mm Hg). (Patient not taking: Reported on 1/25/2024)      lancets Misc To check BG 1 times daily, to use with insurance preferred meter 100 each 11    lancets Misc To check BG 3 times daily, to use with insurance preferred meter 100 each 1    nitroGLYCERIN (NITROSTAT) 0.4 MG SL tablet Place 1 tablet (0.4 mg total) under the tongue every 5 (five) minutes as needed for Chest pain. (Patient not taking: Reported on 1/25/2024) 30 tablet 0    pen needle, diabetic (BD ULTRA-FINE ROBERT PEN NEEDLE) 32 gauge x 5/32" Ndle 1 pen by Misc.(Non-Drug; Combo Route) route 4 (four) times daily. To use 4 times per day with insulin injections. 100 each 1    [DISCONTINUED] clorazepate (TRANXENE) 3.75 MG Tab Take 7.5 mg by mouth nightly as needed.       [DISCONTINUED] dextrose (GLUCOSE GEL) 40 % gel Take 37.5 mLs (15,000 mg total) by mouth once as needed (hypoglycemia). 37.5 g 4    [DISCONTINUED] ezetimibe (ZETIA) 10 mg tablet Take 1 tablet (10 mg total) by mouth once daily.      [DISCONTINUED] fenofibrate 160 MG Tab Take 1 tablet (160 mg total) by mouth once daily. 90 tablet 3    [DISCONTINUED] lancing device with lancets (ACCU-CHEK SOFT DEV LANCETS) Kit To check blood sugars 4 times per day 400 each 3    [DISCONTINUED] pantoprazole (PROTONIX) 40 mg injection Inject 40 mg into the vein every 12 (twelve) hours. (Patient not taking: Reported on 1/25/2024) 60 each 0       Allergies:  Dilaudid [hydromorphone], Chlorhexidine, and Lortab [hydrocodone-acetaminophen]    Past Family " History:  Reviewed; refer to Hospitalist Admission Note    Review of Systems:  Review of Systems - All 14 systems reviewed and negative, except as noted in HPI    Physical Exam:  General Appearance:    NAD, AAO x 3, cooperative, appears stated age   Head:    Normocephalic, atraumatic   Eyes:    PER, EOMI, and conjunctiva/sclera clear bilaterally       Mouth:   Moist mucus membranes, no thrush or oral lesions,       normal dentition   Back:     No CVA tenderness   Lungs:     Clear to auscultation bilaterally, no wheezes, crackles,           rales or rhonchi, symmetric air movement, respirations unlabored   Chest wall:    No tenderness or deformity   Heart:    Regular rate and rhythm, S1 and S2 normal, no murmur, rub   or gallop   Abdomen:     Soft, non-tender, non-distended, bowel sounds active all four   quadrants, no RT or guarding, no masses, no organomegaly   Extremities:   Warm and well perfused, distal pulses are intact, no             cyanosis or peripheral edema   MSK:   No joint or muscle swelling, tenderness or deformity   Skin:   Skin color, texture, turgor normal, no rashes or lesions   Neurologic/Psychiatric:   CNII-XII intact, normal strength and sensation       throughout, no asterixis; normal affect, memory, judgement     and insight      Results:  Lab Results   Component Value Date     01/29/2024    K 4.4 01/29/2024    CL 98 01/29/2024    CO2 25 01/29/2024    BUN 57 (H) 01/29/2024    CREATININE 4.6 (H) 01/29/2024    CALCIUM 9.5 01/29/2024    ANIONGAP 13 01/29/2024    ESTGFRAFRICA 5 (A) 07/29/2022    EGFRNONAA 5 (A) 07/29/2022       Lab Results   Component Value Date    CALCIUM 9.5 01/29/2024    PHOS 7.6 (H) 11/03/2022       Recent Labs   Lab 01/29/24  1454   WBC 7.98   RBC 3.85*   HGB 11.2*   HCT 33.5*      MCV 87   MCH 29.1   MCHC 33.4       I have personally reviewed pertinent radiological imaging and reports.    I have spent > 70 minutes providing care for this patient for the above  diagnoses. These services have included chart/data/imaging review, evaluation, exam, formulation of plan, , note preparation, and discussions with staff involved in this patient's care.    Ashleigh Soria MD MPH  [facility] [address] [address]  [phone] (p)  [phone] (f)

## 2024-01-29 NOTE — ED PROVIDER NOTES
Encounter Date: 1/29/2024       History     Chief Complaint   Patient presents with    Loss of Consciousness     Syncopal episode while at dialysis. Pt finished 2.5 hour of 3 hr dialysis. Goes to Dialysis Monday/Wednesday/Friday. Pt complains of feeling tired but states she always feels like this after dialysis. No further complaints. Denies any chest pain or SOB      50-year-old female PMH significant for ESRD on Monday Wednesday Friday dialysis, T2 DM, anemia presents emergency room today for evaluation of syncope.  Syncope occurred while receiving dialysis.  Patient received 2.5 hours of 3 hours dialysis.  Patient tells me she does not recall the event.  She had no prodromal symptoms.  Denies chest pain or shortness of breath.  Currently feels fatigued but tells me that is not unusual after dialysis.  Pt has AV fistula on LUE and she was supposed to have a revision surgery on Feb 2024 because fistula is not working.  Pts Nephrologist is Dr Madie Ta MD  Pt established care with Dr STEVE Adame on October 2023    Chart review:  ECHO 01/22/2024:  ·  Left Ventricle: The left ventricle is normal in size. Mildly increased wall thickness. There is mild concentric hypertrophy. Normal wall motion. There is normal systolic function with a visually estimated ejection fraction of 60 - 65%. There is normal diastolic function.  ·  Right Ventricle: Normal right ventricular cavity size. Wall thickness is normal. Right ventricle wall motion  is normal. Systolic function is normal.  ·  Aortic Valve: There is mild aortic valve sclerosis.  ·  Mitral Valve: There is mild regurgitation with a centrally directed jet.  ·  IVC/SVC: Normal venous pressure at 3 mmHg.  ·  Pericardium: There is a trivial posterior effusion. No indication of cardiac tamponade.    STRESS: 01/23/2024  ·  The ECG portion of the study is negative for ischemia.  ·  The patient reported no chest pain during the stress test.  ·  There were no arrhythmias during  stress.  ·  The nuclear portion of this study will be reported separately.            The history is provided by the patient. No  was used.     Review of patient's allergies indicates:   Allergen Reactions    Dilaudid [hydromorphone] Nausea And Vomiting    Chlorhexidine Itching    Lortab [hydrocodone-acetaminophen] Itching     Past Medical History:   Diagnosis Date    A-fib     resolved per patient    Anemia due to end stage renal disease 6/30/2022    Arthritis     Bronchitis     Cardiovascular event risk, ASCVD 10-year risk 6.7% 10/15/2016    Diabetes mellitus type II     Diabetic foot infection     Diabetic ulcer of left midfoot 05/05/2022    Disorder of kidney and ureter     Encounter for blood transfusion     ESRD (end stage renal disease) 05/18/2018    MANJINDER (generalized anxiety disorder) 10/25/2016    History of colon polyps 11/02/2016    Hyperlipidemia     Hypertension     Stroke      Past Surgical History:   Procedure Laterality Date    ANGIOGRAPHY OF LOWER EXTREMITY Left 10/18/2022    Procedure: Angiogram Extremity Unilateral;  Surgeon: Ali Khoobehi, MD;  Location: [facility] CATH/EP LAB;  Service: Vascular;  Laterality: Left;    AV FISTULA PLACEMENT Left 8/29/2022    Procedure: CREATION, AV FISTULA;  Surgeon: Ali Khoobehi, MD;  Location: [facility] OR;  Service: Vascular;  Laterality: Left;    BONE BIOPSY Left 8/24/2022    Procedure: Biopsy-Bone;  Surgeon: Porfirio Ponce DPM;  Location: [facility] OR;  Service: Podiatry;  Laterality: Left;    COLONOSCOPY N/A 10/5/2016    Procedure: COLONOSCOPY;  Surgeon: Pillo Chanel MD;  Location: [facility] ENDO;  Service: Endoscopy;  Laterality: N/A;    COLONOSCOPY N/A 4/26/2022    Procedure: COLONOSCOPY;  Surgeon: Saravanan Rachel MD;  Location: [facility] ENDO;  Service: Endoscopy;  Laterality: N/A;    FISTULOGRAM Left 8/24/2022    Procedure: Fistulogram;  Surgeon: Ali Khoobehi, MD;  Location: [facility] CATH/EP LAB;  Service: Vascular;  Laterality: Left;    HERNIA REPAIR  Bilateral 11/22/2016    inguinal    HYSTERECTOMY      INCISION AND DRAINAGE FOOT Left 5/13/2022    Procedure: INCISION AND DRAINAGE, FOOT;  Surgeon: Ricardo Bruno DPM;  Location: [facility] OR;  Service: Podiatry;  Laterality: Left;    OOPHORECTOMY      THROMBECTOMY, AV FISTULA, UPPER EXTREMITY, PERCUTANEOUS  8/24/2022    Procedure: THROMBECTOMY, AV FISTULA, UPPER EXTREMITY, PERCUTANEOUS;  Surgeon: Ali Khoobehi, MD;  Location: [facility] CATH/EP LAB;  Service: Vascular;;    TOE AMPUTATION Left 8/26/2022    Procedure: AMPUTATION, TOE;  Surgeon: Porfirio Ponce DPM;  Location: [facility] OR;  Service: Podiatry;  Laterality: Left;     Family History   Problem Relation Age of Onset    Hyperlipidemia Mother     Hypertension Mother     Hypertension Father     Diabetes Father     Diabetes Sister     Diabetes Sister     Diabetes Maternal Aunt     Diabetes Maternal Grandmother     Heart disease Maternal Grandmother     Cancer Paternal Grandmother     Breast cancer Neg Hx     Colon cancer Neg Hx     Ovarian cancer Neg Hx      Social History     Tobacco Use    Smoking status: Never    Smokeless tobacco: Never   Substance Use Topics    Alcohol use: No    Drug use: No     Review of Systems   Constitutional:  Positive for fatigue. Negative for chills and fever.   HENT:  Negative for congestion, hearing loss, sore throat and trouble swallowing.    Eyes:  Negative for visual disturbance.   Respiratory:  Positive for chest tightness. Negative for cough and shortness of breath.    Cardiovascular:  Positive for chest pain.   Gastrointestinal:  Negative for abdominal pain and nausea.   Endocrine: Negative for polyuria.   Genitourinary:  Negative for difficulty urinating.   Musculoskeletal:  Negative for arthralgias and myalgias.   Skin:  Negative for rash.   Neurological:  Positive for syncope. Negative for dizziness and headaches.   Psychiatric/Behavioral:  The patient is not nervous/anxious.        Physical Exam     Initial Vitals [01/29/24 1437]    BP Pulse Resp Temp SpO2   (!) 171/74 83 20 97.7 °F (36.5 °C) 98 %      MAP       --         Physical Exam    Nursing note and vitals reviewed.  Constitutional: She appears well-developed and well-nourished.   Disheveled   HENT:   Head: Normocephalic and atraumatic.   Right Ear: External ear normal.   Left Ear: External ear normal.   Eyes: Conjunctivae are normal. Pupils are equal, round, and reactive to light.   Neck: Neck supple.   Normal range of motion.  Cardiovascular:  Normal rate, regular rhythm and normal heart sounds.           Pulmonary/Chest: Breath sounds normal.   Right chest wall dialysis port.  No surrounding erythematous change   Abdominal: Abdomen is soft. She exhibits no distension. There is no abdominal tenderness.   Musculoskeletal:         General: Normal range of motion.      Cervical back: Normal range of motion and neck supple.      Comments: Left upper extremity bandage.  Left foot bandaged.     Neurological: She is alert and oriented to person, place, and time. She has normal strength. GCS score is 15. GCS eye subscore is 4. GCS verbal subscore is 5. GCS motor subscore is 6.   Skin: Skin is warm and dry. Capillary refill takes less than 2 seconds.   Psychiatric: She has a normal mood and affect. Her behavior is normal. Judgment and thought content normal.         ED Course   Procedures  Labs Reviewed   CBC W/ AUTO DIFFERENTIAL - Abnormal; Notable for the following components:       Result Value    RBC 3.85 (*)     Hemoglobin 11.2 (*)     Hematocrit 33.5 (*)     RDW 14.8 (*)     Lymph % 17.2 (*)     All other components within normal limits   COMPREHENSIVE METABOLIC PANEL - Abnormal; Notable for the following components:    Glucose 207 (*)     BUN 57 (*)     Creatinine 4.6 (*)     eGFR 10.5 (*)     All other components within normal limits   TROPONIN I HIGH SENSITIVITY - Abnormal; Notable for the following components:    Troponin I High Sensitivity 26.0 (*)     All other components within  normal limits   B-TYPE NATRIURETIC PEPTIDE - Abnormal; Notable for the following components:     (*)     All other components within normal limits   MAGNESIUM   TROPONIN I HIGH SENSITIVITY          Imaging Results              X-Ray Chest AP Portable (In process)                      CT Head Without Contrast (Final result)  Result time 01/29/24 15:45:57      Final result by Joseph Stephens MD (01/29/24 15:45:57)                   Narrative:    CT HEAD WITHOUT CONTRAST    CMS MANDATED QUALITY DATA - CT RADIATION  436    All CT scans at this facility utilize dose modulation, iterative reconstruction, and/or weight based dosing when appropriate to reduce radiation dose to as low as reasonably achievable    Clinical data: Recurrent syncope.    FINDINGS: Noninfusion images were obtained from the skull base to the vertex. There is no intracranial mass, hemorrhage, or midline shift. Ventricles and sulci are upper normal. There are no pathologic extra-axial fluid collections.    There is no evidence of cortical ischemic change. Changes of mild chronic microvascular white matter disease are noted. Small chronic lacunar infarct is noted at the right basal ganglia. Cerebellum and brainstem are normal. The calvarium is intact.    IMPRESSION:    1. No acute intracranial abnormalities. Chronic findings as discussed above.        Electronically signed by:  Joseph Stephens MD  01/29/2024 03:45 PM CST Workstation: [id number]                                     Medications - No data to display  Medical Decision Making  Patient presents for emergent evaluation of syncope. Workup today consistent with likely syncope of unknown etiology  Differential includes vasovagal cardiogenic, orthostatic syncope.  Less likely ACS, CVA.  Patient is nontoxic and well appearing, Afebrile with stable vital signs.  Labs were ordered and documented in the ED course.  Notable for mildly elevated BNP, mildly elevated troponin, stable anemia.   No significant metabolic abnormality.  Stable CKD status post partial dialysis.  Imaging was ordered and documented in the ED course.  CT head negative for acute intracranial abnormality.  Chest x-ray on my review negative for acute cardiopulmonary abnormality.  I discussed patient case with consultant Nicole, [facility] physician assistant.  She agrees to evaluate the patient for admission.        Amount and/or Complexity of Data Reviewed  Labs: ordered. Decision-making details documented in ED Course.  Radiology: ordered.    Risk  Decision regarding hospitalization.               ED Course as of 01/29/24 1614   Mon Jan 29, 2024   1453 BP(!): 171/74 [AN]   1453 Temp: 97.7 °F (36.5 °C) [AN]   1453 Pulse: 83 [AN]   1453 Resp: 20 [AN]   1453 SpO2: 98 % [AN]   1547 CBC auto differential(!)  Stable anemia.  Normal platelets.  No leukocytosis. [AN]   1547 Magnesium [AN]   1547 Comprehensive metabolic panel(!)  Hyperglycemia.  CKD.  Normal hepatic function. [AN]   1547 BNP(!): 264 [AN]   1547 Troponin I High Sensitivity(!): 26.0 [AN]      ED Course User Index  [AN] Dylan Mata PA-C                           Clinical Impression:  Final diagnoses:  [R07.9] Chest pain  [R55] Syncope, unspecified syncope type (Primary)          ED Disposition Condition    Admit Stable                Dylan Mata PA-C  01/29/24 1632

## 2024-01-29 NOTE — ASSESSMENT & PLAN NOTE
Patient started on diabetic diet   Last A1c (6/6/2023) 8.3  Hold Oral hypoglycemics while patient is in the hospital.  Sliding scale insulin ordered  Continue to monitor blood glucose with POC glucose checks

## 2024-01-29 NOTE — ASSESSMENT & PLAN NOTE
Patient has chronic diastolic heart failure   Last ECHO (1/22/2024)    Left Ventricle: The left ventricle is normal in size. Mildly increased wall thickness. There is mild concentric hypertrophy. Normal wall motion. There is normal systolic function with a visually estimated ejection fraction of 60 - 65%. There is normal diastolic function.    Right Ventricle: Normal right ventricular cavity size. Wall thickness is normal. Right ventricle wall motion  is normal. Systolic function is normal.    Aortic Valve: There is mild aortic valve sclerosis.    Mitral Valve: There is mild regurgitation with a centrally directed jet.    IVC/SVC: Normal venous pressure at 3 mmHg.    Pericardium: There is a trivial posterior effusion. No indication of cardiac tamponade.    Monitor intake and output  Monitor daily weights   BNP today 264  Cardiology consulted

## 2024-01-30 PROBLEM — L97.522 ULCER OF LEFT FOOT WITH FAT LAYER EXPOSED: Status: ACTIVE | Noted: 2024-01-30

## 2024-01-30 LAB
ANION GAP SERPL CALC-SCNC: 11 MMOL/L (ref 8–16)
BASOPHILS # BLD AUTO: 0.04 K/UL (ref 0–0.2)
BASOPHILS NFR BLD: 0.5 % (ref 0–1.9)
BUN SERPL-MCNC: 68 MG/DL (ref 6–20)
CALCIUM SERPL-MCNC: 9.4 MG/DL (ref 8.7–10.5)
CHLORIDE SERPL-SCNC: 101 MMOL/L (ref 95–110)
CO2 SERPL-SCNC: 26 MMOL/L (ref 23–29)
CREAT SERPL-MCNC: 6.2 MG/DL (ref 0.5–1.4)
DIFFERENTIAL METHOD BLD: ABNORMAL
EOSINOPHIL # BLD AUTO: 0.2 K/UL (ref 0–0.5)
EOSINOPHIL NFR BLD: 2.9 % (ref 0–8)
ERYTHROCYTE [DISTWIDTH] IN BLOOD BY AUTOMATED COUNT: 15 % (ref 11.5–14.5)
EST. GFR  (NO RACE VARIABLE): 7.3 ML/MIN/1.73 M^2
ESTIMATED AVG GLUCOSE: 120 MG/DL (ref 68–131)
GLUCOSE SERPL-MCNC: 109 MG/DL (ref 70–110)
GLUCOSE SERPL-MCNC: 117 MG/DL (ref 70–110)
GLUCOSE SERPL-MCNC: 124 MG/DL (ref 70–110)
GLUCOSE SERPL-MCNC: 127 MG/DL (ref 70–110)
GLUCOSE SERPL-MCNC: 141 MG/DL (ref 70–110)
HBA1C MFR BLD: 5.8 % (ref 4.5–6.2)
HCT VFR BLD AUTO: 31.6 % (ref 37–48.5)
HGB BLD-MCNC: 10.2 G/DL (ref 12–16)
IMM GRANULOCYTES # BLD AUTO: 0.02 K/UL (ref 0–0.04)
IMM GRANULOCYTES NFR BLD AUTO: 0.3 % (ref 0–0.5)
LYMPHOCYTES # BLD AUTO: 1.9 K/UL (ref 1–4.8)
LYMPHOCYTES NFR BLD: 25.7 % (ref 18–48)
MCH RBC QN AUTO: 28.5 PG (ref 27–31)
MCHC RBC AUTO-ENTMCNC: 32.3 G/DL (ref 32–36)
MCV RBC AUTO: 88 FL (ref 82–98)
MONOCYTES # BLD AUTO: 0.7 K/UL (ref 0.3–1)
MONOCYTES NFR BLD: 9.5 % (ref 4–15)
NEUTROPHILS # BLD AUTO: 4.6 K/UL (ref 1.8–7.7)
NEUTROPHILS NFR BLD: 61.1 % (ref 38–73)
NRBC BLD-RTO: 0 /100 WBC
PLATELET # BLD AUTO: 222 K/UL (ref 150–450)
PMV BLD AUTO: 10.9 FL (ref 9.2–12.9)
POTASSIUM SERPL-SCNC: 5.3 MMOL/L (ref 3.5–5.1)
RBC # BLD AUTO: 3.58 M/UL (ref 4–5.4)
SODIUM SERPL-SCNC: 138 MMOL/L (ref 136–145)
TROPONIN I SERPL HS-MCNC: 118.7 PG/ML (ref 0–14.9)
TROPONIN I SERPL HS-MCNC: 184.2 PG/ML (ref 0–14.9)
TROPONIN I SERPL HS-MCNC: 67.9 PG/ML (ref 0–14.9)
WBC # BLD AUTO: 7.47 K/UL (ref 3.9–12.7)

## 2024-01-30 PROCEDURE — 36415 COLL VENOUS BLD VENIPUNCTURE: CPT | Performed by: STUDENT IN AN ORGANIZED HEALTH CARE EDUCATION/TRAINING PROGRAM

## 2024-01-30 PROCEDURE — 96372 THER/PROPH/DIAG INJ SC/IM: CPT

## 2024-01-30 PROCEDURE — 85025 COMPLETE CBC W/AUTO DIFF WBC: CPT

## 2024-01-30 PROCEDURE — 96375 TX/PRO/DX INJ NEW DRUG ADDON: CPT

## 2024-01-30 PROCEDURE — 63600175 PHARM REV CODE 636 W HCPCS

## 2024-01-30 PROCEDURE — 11042 DBRDMT SUBQ TIS 1ST 20SQCM/<: CPT | Mod: ,,, | Performed by: PODIATRIST

## 2024-01-30 PROCEDURE — 84484 ASSAY OF TROPONIN QUANT: CPT | Mod: 91 | Performed by: STUDENT IN AN ORGANIZED HEALTH CARE EDUCATION/TRAINING PROGRAM

## 2024-01-30 PROCEDURE — 25000003 PHARM REV CODE 250: Performed by: NURSE PRACTITIONER

## 2024-01-30 PROCEDURE — 5A1D70Z PERFORMANCE OF URINARY FILTRATION, INTERMITTENT, LESS THAN 6 HOURS PER DAY: ICD-10-PCS | Performed by: INTERNAL MEDICINE

## 2024-01-30 PROCEDURE — 63600175 PHARM REV CODE 636 W HCPCS: Performed by: INTERNAL MEDICINE

## 2024-01-30 PROCEDURE — 99222 1ST HOSP IP/OBS MODERATE 55: CPT | Mod: 25,,, | Performed by: PODIATRIST

## 2024-01-30 PROCEDURE — 80048 BASIC METABOLIC PNL TOTAL CA: CPT

## 2024-01-30 PROCEDURE — 96374 THER/PROPH/DIAG INJ IV PUSH: CPT

## 2024-01-30 PROCEDURE — 12000002 HC ACUTE/MED SURGE SEMI-PRIVATE ROOM

## 2024-01-30 PROCEDURE — 96376 TX/PRO/DX INJ SAME DRUG ADON: CPT

## 2024-01-30 PROCEDURE — 0JBR0ZZ EXCISION OF LEFT FOOT SUBCUTANEOUS TISSUE AND FASCIA, OPEN APPROACH: ICD-10-PCS | Performed by: PODIATRIST

## 2024-01-30 PROCEDURE — 25000003 PHARM REV CODE 250

## 2024-01-30 PROCEDURE — 90935 HEMODIALYSIS ONE EVALUATION: CPT

## 2024-01-30 PROCEDURE — 36415 COLL VENOUS BLD VENIPUNCTURE: CPT

## 2024-01-30 RX ORDER — HEPARIN SODIUM 1000 [USP'U]/ML
4000 INJECTION, SOLUTION INTRAVENOUS; SUBCUTANEOUS
Status: DISCONTINUED | OUTPATIENT
Start: 2024-01-30 | End: 2024-01-31 | Stop reason: HOSPADM

## 2024-01-30 RX ORDER — LABETALOL HYDROCHLORIDE 5 MG/ML
10 INJECTION, SOLUTION INTRAVENOUS EVERY 4 HOURS PRN
Status: DISCONTINUED | OUTPATIENT
Start: 2024-01-30 | End: 2024-01-31 | Stop reason: HOSPADM

## 2024-01-30 RX ORDER — SODIUM CHLORIDE 9 MG/ML
INJECTION, SOLUTION INTRAVENOUS ONCE
Status: CANCELLED | OUTPATIENT
Start: 2024-01-30 | End: 2024-01-30

## 2024-01-30 RX ORDER — MUPIROCIN 20 MG/G
OINTMENT TOPICAL 2 TIMES DAILY
Status: DISCONTINUED | OUTPATIENT
Start: 2024-01-30 | End: 2024-01-31 | Stop reason: HOSPADM

## 2024-01-30 RX ADMIN — MUPIROCIN 1 G: 20 OINTMENT TOPICAL at 10:01

## 2024-01-30 RX ADMIN — MINOXIDIL 5 MG: 2.5 TABLET ORAL at 08:01

## 2024-01-30 RX ADMIN — GABAPENTIN 100 MG: 100 CAPSULE ORAL at 08:01

## 2024-01-30 RX ADMIN — ONDANSETRON 4 MG: 2 INJECTION INTRAMUSCULAR; INTRAVENOUS at 12:01

## 2024-01-30 RX ADMIN — ACETAMINOPHEN 650 MG: 325 TABLET ORAL at 03:01

## 2024-01-30 RX ADMIN — PANTOPRAZOLE SODIUM 40 MG: 40 TABLET, DELAYED RELEASE ORAL at 08:01

## 2024-01-30 RX ADMIN — SODIUM ZIRCONIUM CYCLOSILICATE 5 G: 5 POWDER, FOR SUSPENSION ORAL at 11:01

## 2024-01-30 RX ADMIN — CINACALCET 120 MG: 30 TABLET, FILM COATED ORAL at 05:01

## 2024-01-30 RX ADMIN — Medication 6 MG: at 12:01

## 2024-01-30 RX ADMIN — ASPIRIN 81 MG: 81 TABLET, COATED ORAL at 08:01

## 2024-01-30 RX ADMIN — CINACALCET 120 MG: 30 TABLET, FILM COATED ORAL at 08:01

## 2024-01-30 RX ADMIN — CLOPIDOGREL BISULFATE 75 MG: 75 TABLET, FILM COATED ORAL at 08:01

## 2024-01-30 RX ADMIN — HEPARIN SODIUM 5000 UNITS: 5000 INJECTION, SOLUTION INTRAVENOUS; SUBCUTANEOUS at 06:01

## 2024-01-30 RX ADMIN — LABETALOL HYDROCHLORIDE 10 MG: 5 INJECTION, SOLUTION INTRAVENOUS at 12:01

## 2024-01-30 RX ADMIN — HEPARIN SODIUM 4000 UNITS: 1000 INJECTION, SOLUTION INTRAVENOUS; SUBCUTANEOUS at 04:01

## 2024-01-30 RX ADMIN — Medication 6 MG: at 10:01

## 2024-01-30 RX ADMIN — LABETALOL HYDROCHLORIDE 10 MG: 5 INJECTION, SOLUTION INTRAVENOUS at 10:01

## 2024-01-30 RX ADMIN — ACETAMINOPHEN 650 MG: 325 TABLET ORAL at 10:01

## 2024-01-30 RX ADMIN — ATORVASTATIN CALCIUM 80 MG: 40 TABLET, FILM COATED ORAL at 08:01

## 2024-01-30 RX ADMIN — ONDANSETRON 4 MG: 2 INJECTION INTRAMUSCULAR; INTRAVENOUS at 07:01

## 2024-01-30 RX ADMIN — ACETAMINOPHEN 650 MG: 325 TABLET ORAL at 11:01

## 2024-01-30 RX ADMIN — DOXYCYCLINE HYCLATE 100 MG: 100 CAPSULE ORAL at 08:01

## 2024-01-30 RX ADMIN — DOXYCYCLINE HYCLATE 100 MG: 100 CAPSULE ORAL at 09:01

## 2024-01-30 RX ADMIN — MUPIROCIN 1 G: 20 OINTMENT TOPICAL at 08:01

## 2024-01-30 NOTE — NURSING
Nurses Note -- 4 Eyes      1/29/2024   11:50 PM      Skin assessed during: Admit      [] No Altered Skin Integrity Present    [x]Prevention Measures Documented      [x] Yes- Altered Skin Integrity Present or Discovered   [x] LDA Added if Not in Epic (Describe Wound)   [x] New Altered Skin Integrity was Present on Admit and Documented in LDA   [x] Wound Image Taken    Wound Care Consulted? Yes    Attending Nurse:  Christy Bailey RN/Staff Member:   ABI roy        Wound care consult placed by MD in ER.

## 2024-01-30 NOTE — HPI
""50-year-old female PMH significant for ESRD on Monday Wednesday Friday dialysis, T2 DM, anemia presents emergency room today for evaluation of syncope.  Syncope occurred while receiving dialysis.  Patient received 2.5 hours of 3 hours dialysis.  Patient tells me she does not recall the event.  She had no prodromal symptoms.  Denies chest pain or shortness of breath.  Currently feels fatigued but tells me that is not unusual after dialysis.  Pt has AV fistula on LUE and she was supposed to have a revision surgery on Feb 2024 because fistula is not working.  Pts Nephrologist is Dr Madie Ta MD  Pt established care with Dr STEVE Adame on October 2023"      Patient has a chronic left plantar foot diabetic foot ulcer. Podiatry was consulted for treatment.   "

## 2024-01-30 NOTE — ASSESSMENT & PLAN NOTE
Patient has chronic diastolic heart failure   Last ECHO (1/22/2024)    Left Ventricle: The left ventricle is normal in size. Mildly increased wall thickness. There is mild concentric hypertrophy. Normal wall motion. There is normal systolic function with a visually estimated ejection fraction of 60 - 65%. There is normal diastolic function.    Right Ventricle: Normal right ventricular cavity size. Wall thickness is normal. Right ventricle wall motion  is normal. Systolic function is normal.    Aortic Valve: There is mild aortic valve sclerosis.    Mitral Valve: There is mild regurgitation with a centrally directed jet.    IVC/SVC: Normal venous pressure at 3 mmHg.    Pericardium: There is a trivial posterior effusion. No indication of cardiac tamponade.    Monitor intake and output  Monitor daily weights

## 2024-01-30 NOTE — PROGRESS NOTES
INPATIENT NEPHROLOGY [facility]  [facility]    Patient Name: Leanna García  Date: 01/30/2024    Reason for consultation: ESRD    Chief Complaint:   Chief Complaint   Patient presents with    Loss of Consciousness     Syncopal episode while at dialysis. Pt finished 2.5 hour of 3 hr dialysis. Goes to Dialysis Monday/Wednesday/Friday. Pt complains of feeling tired but states she always feels like this after dialysis. No further complaints. Denies any chest pain or SOB        History of Present Illness:  Our ESRD patient on HD MWF who came from dialysis after developing syncopal episode 2.5 hours into HD- unresponsive- doesn't remember much- says something similar happened 1 week ago- no exac/reliev factors. Consulted for dialysis.    Interval History:  1/29- comfortable appearing, trop improved from prior, BNP stable from prior, echo and nuclear stress test last week neg, CT head neg, CXR clear  1/30  VSS, K+ a little high today.  Will order a dose of lokelma Had HD yesterday, debbi about 2.5 hrs.    Plan of Care:    Assessment:  Syncope  ESRD on HD MWF  HTN  SHPT  Anemia of CKD    Plan:    - syncope w/u per HM- don't see recent carotid duplex  - no acute RRT needs today- continue HD MWF  - resume home BP meds- renal diet, 1.5L fluid restriction  - resume binders with meals  - will give FERMÍN with routine HD    Thank you for allowing us to participate in this patient's care. We will continue to follow.    Vital Signs:  Temp Readings from Last 3 Encounters:   01/30/24 97.8 °F (36.6 °C) (Oral)   01/24/24 98.1 °F (36.7 °C)   01/16/24 98.5 °F (36.9 °C)       Pulse Readings from Last 3 Encounters:   01/30/24 82   01/24/24 77   01/16/24 88       BP Readings from Last 3 Encounters:   01/30/24 (!) 161/81   01/24/24 (!) 123/55   01/16/24 (!) 190/108       Weight:  Wt Readings from Last 3 Encounters:   01/30/24 79 kg (174 lb 2.6 oz)   01/22/24 78.7 kg (173 lb 8 oz)   01/23/24 78.7 kg (173 lb 8 oz)       Past  Medical & Surgical History:  Past Medical History:   Diagnosis Date    A-fib     resolved per patient    Anemia due to end stage renal disease 6/30/2022    Arthritis     Bronchitis     Cardiovascular event risk, ASCVD 10-year risk 6.7% 10/15/2016    Diabetes mellitus type II     Diabetic foot infection     Diabetic ulcer of left midfoot 05/05/2022    Disorder of kidney and ureter     Encounter for blood transfusion     ESRD (end stage renal disease) 05/18/2018    MANJINDER (generalized anxiety disorder) 10/25/2016    History of colon polyps 11/02/2016    Hyperlipidemia     Hypertension     Stroke        Past Surgical History:   Procedure Laterality Date    ANGIOGRAPHY OF LOWER EXTREMITY Left 10/18/2022    Procedure: Angiogram Extremity Unilateral;  Surgeon: Ali Khoobehi, MD;  Location: [facility] CATH/EP LAB;  Service: Vascular;  Laterality: Left;    AV FISTULA PLACEMENT Left 8/29/2022    Procedure: CREATION, AV FISTULA;  Surgeon: Ali Khoobehi, MD;  Location: [facility] OR;  Service: Vascular;  Laterality: Left;    BONE BIOPSY Left 8/24/2022    Procedure: Biopsy-Bone;  Surgeon: Porfirio Ponce DPM;  Location: [facility] OR;  Service: Podiatry;  Laterality: Left;    COLONOSCOPY N/A 10/5/2016    Procedure: COLONOSCOPY;  Surgeon: Pillo Chanel MD;  Location: [facility] ENDO;  Service: Endoscopy;  Laterality: N/A;    COLONOSCOPY N/A 4/26/2022    Procedure: COLONOSCOPY;  Surgeon: Saravanan Rachel MD;  Location: [facility] ENDO;  Service: Endoscopy;  Laterality: N/A;    FISTULOGRAM Left 8/24/2022    Procedure: Fistulogram;  Surgeon: Ali Khoobehi, MD;  Location: [facility] CATH/EP LAB;  Service: Vascular;  Laterality: Left;    HERNIA REPAIR Bilateral 11/22/2016    inguinal    HYSTERECTOMY      INCISION AND DRAINAGE FOOT Left 5/13/2022    Procedure: INCISION AND DRAINAGE, FOOT;  Surgeon: Ricardo Bruno DPM;  Location: [facility] OR;  Service: Podiatry;  Laterality: Left;    OOPHORECTOMY      THROMBECTOMY, AV FISTULA, UPPER EXTREMITY, PERCUTANEOUS  8/24/2022     Procedure: THROMBECTOMY, AV FISTULA, UPPER EXTREMITY, PERCUTANEOUS;  Surgeon: Ali Khoobehi, MD;  Location: [facility] CATH/EP LAB;  Service: Vascular;;    TOE AMPUTATION Left 8/26/2022    Procedure: AMPUTATION, TOE;  Surgeon: Porfirio Ponce DPM;  Location: [facility] OR;  Service: Podiatry;  Laterality: Left;       Past Social History:  Social History     Socioeconomic History    Marital status:    Tobacco Use    Smoking status: Never    Smokeless tobacco: Never   Substance and Sexual Activity    Alcohol use: No    Drug use: No    Sexual activity: Yes     Partners: Male   Social History Narrative    Caregiver      Social Determinants of Health     Financial Resource Strain: Low Risk  (11/3/2022)    Overall Financial Resource Strain (CARDIA)     Difficulty of Paying Living Expenses: Not hard at all   Food Insecurity: No Food Insecurity (11/3/2022)    Hunger Vital Sign     Worried About Running Out of Food in the Last Year: Never true     Ran Out of Food in the Last Year: Never true   Transportation Needs: No Transportation Needs (11/3/2022)    PRAPARE - Transportation     Lack of Transportation (Medical): No     Lack of Transportation (Non-Medical): No   Physical Activity: Insufficiently Active (11/3/2022)    Exercise Vital Sign     Days of Exercise per Week: 2 days     Minutes of Exercise per Session: 60 min   Stress: No Stress Concern Present (11/3/2022)    [employer] [place] [employer] - Occupational Stress Questionnaire     Feeling of Stress : Not at all   Social Connections: Moderately Isolated (11/3/2022)    Social Connection and Isolation Panel [NHANES]     Frequency of Communication with Friends and Family: More than three times a week     Frequency of Social Gatherings with Friends and Family: More than three times a week     Attends [employer] Services: Never     Active Member of Clubs or Organizations: No     Attends Club or Organization Meetings: Never     Marital Status:     Housing Stability: Unknown (11/3/2022)    Housing Stability Vital Sign     Unable to Pay for Housing in the Last Year: No     Unstable Housing in the Last Year: No       Medications:  Scheduled Meds:  Continuous Infusions:  PRN Meds:.  No current facility-administered medications on file prior to encounter.     Current Outpatient Medications on File Prior to Encounter   Medication Sig Dispense Refill    amLODIPine (NORVASC) 5 MG tablet Take 1 tablet by mouth once daily.      aspirin (ECOTRIN) 81 MG EC tablet Take 1 tablet (81 mg total) by mouth once daily.  0    atorvastatin (LIPITOR) 80 MG tablet Take 1 tablet (80 mg total) by mouth once daily. 90 tablet 3    cinacalcet (SENSIPAR) 60 MG Tab Take 2 tablets by mouth 2 (two) times daily with meals.      clopidogreL (PLAVIX) 75 mg tablet Take 1 tablet (75 mg total) by mouth once daily. 90 tablet 3    doxycycline (VIBRAMYCIN) 100 MG Cap Take 100 mg by mouth 2 (two) times daily.      gabapentin (NEURONTIN) 100 MG capsule Take 1 capsule (100 mg total) by mouth 2 (two) times daily. 180 capsule 3    minoxidiL (LONITEN) 2.5 MG tablet Take 2 tablets by mouth 2 (two) times daily.      multivitamin (THERAGRAN) per tablet Take 1 tablet by mouth once daily.      OZEMPIC 0.25 mg or 0.5 mg (2 mg/3 mL) pen injector INJECT 0.25 MG SUBCUTANEOUSLY ONCE A WEEK (Patient taking differently: Inject 0.25 mg into the skin every 7 days. INJECT 0.25 MG SUBCUTANEOUSLY ONCE A WEEK) 1.5 mL 2    pantoprazole (PROTONIX) 40 MG tablet Take 1 tablet (40 mg total) by mouth once daily. 30 tablet 2    sucroferric oxyhydroxide (VELPHORO) 500 mg Chew Take 2 tablets by mouth 3 (three) times daily.      acetaminophen (TYLENOL) 325 MG tablet Take 325 mg by mouth every 6 (six) hours.      albuterol (PROVENTIL/VENTOLIN HFA) 90 mcg/actuation inhaler Inhale 2 puffs into the lungs every 4 to 6 hours as needed for Shortness of Breath or Wheezing.      ALPRAZolam (XANAX) 0.5 MG tablet Take 1 tablet by mouth.       "blood sugar diagnostic Strp To check BG 4 times daily, to use with insurance preferred meter 450 strip 3    blood-glucose meter kit To check BG 3 times daily, to use with insurance preferred meter 1 each 0    cetirizine (ZYRTEC) 10 MG tablet Take 1 tablet (10 mg total) by mouth every other day. 45 tablet 3    epoetin chris-epbx (RETACRIT) 4,000 unit/mL injection Inject 1.11 mLs (4,440 Units total) into the skin every Mon, Wed, Fri.      hydrALAZINE (APRESOLINE) 25 MG tablet Take 1 tablet (25 mg total) by mouth 2 (two) times daily as needed (Systolic blood pressure > 170 mm Hg). (Patient not taking: Reported on 1/25/2024)      lancets Misc To check BG 1 times daily, to use with insurance preferred meter 100 each 11    lancets Misc To check BG 3 times daily, to use with insurance preferred meter 100 each 1    nitroGLYCERIN (NITROSTAT) 0.4 MG SL tablet Place 1 tablet (0.4 mg total) under the tongue every 5 (five) minutes as needed for Chest pain. (Patient not taking: Reported on 1/25/2024) 30 tablet 0    pen needle, diabetic (BD ULTRA-FINE ROBERT PEN NEEDLE) 32 gauge x 5/32" Ndle 1 pen by Misc.(Non-Drug; Combo Route) route 4 (four) times daily. To use 4 times per day with insulin injections. 100 each 1    [DISCONTINUED] clorazepate (TRANXENE) 3.75 MG Tab Take 7.5 mg by mouth nightly as needed.       [DISCONTINUED] dextrose (GLUCOSE GEL) 40 % gel Take 37.5 mLs (15,000 mg total) by mouth once as needed (hypoglycemia). 37.5 g 4    [DISCONTINUED] ezetimibe (ZETIA) 10 mg tablet Take 1 tablet (10 mg total) by mouth once daily.      [DISCONTINUED] fenofibrate 160 MG Tab Take 1 tablet (160 mg total) by mouth once daily. 90 tablet 3    [DISCONTINUED] lancing device with lancets (ACCU-CHEK SOFT DEV LANCETS) Kit To check blood sugars 4 times per day 400 each 3       Allergies:  Dilaudid [hydromorphone], Chlorhexidine, and Lortab [hydrocodone-acetaminophen]    Past Family History:  Reviewed; refer to Hospitalist Admission " Note    Review of Systems:  Review of Systems - All 14 systems reviewed and negative, except as noted in HPI    Physical Exam:  General Appearance:    NAD, AAO x 3, cooperative, appears stated age   Head:    Normocephalic, atraumatic   Eyes:    PER, EOMI, and conjunctiva/sclera clear bilaterally       Mouth:   Moist mucus membranes, no thrush or oral lesions,       normal dentition   Back:     No CVA tenderness   Lungs:     Clear to auscultation bilaterally, no wheezes, crackles,           rales or rhonchi, symmetric air movement, respirations unlabored   Chest wall:    No tenderness or deformity   Heart:    Regular rate and rhythm, S1 and S2 normal, no murmur, rub   or gallop   Abdomen:     Soft, non-tender, non-distended, bowel sounds active all four   quadrants, no RT or guarding, no masses, no organomegaly   Extremities:   Warm and well perfused, distal pulses are intact, no             cyanosis or peripheral edema   MSK:   No joint or muscle swelling, tenderness or deformity   Skin:   Skin color, texture, turgor normal, no rashes or lesions   Neurologic/Psychiatric:   CNII-XII intact, normal strength and sensation       throughout, no asterixis; normal affect, memory, judgement     and insight      Results:  Lab Results   Component Value Date     01/30/2024    K 5.3 (H) 01/30/2024     01/30/2024    CO2 26 01/30/2024    BUN 68 (H) 01/30/2024    CREATININE 6.2 (H) 01/30/2024    CALCIUM 9.4 01/30/2024    ANIONGAP 11 01/30/2024    ESTGFRAFRICA 5 (A) 07/29/2022    EGFRNONAA 5 (A) 07/29/2022       Lab Results   Component Value Date    CALCIUM 9.4 01/30/2024    PHOS 7.6 (H) 11/03/2022       Recent Labs   Lab 01/30/24  0610   WBC 7.47   RBC 3.58*   HGB 10.2*   HCT 31.6*      MCV 88   MCH 28.5   MCHC 32.3         I have personally reviewed pertinent radiological imaging and reports.    I have spent > 70 minutes providing care for this patient for the above diagnoses. These services have included  chart/data/imaging review, evaluation, exam, formulation of plan, , note preparation, and discussions with staff involved in this patient's care.    Ashleigh Soria MD MPH  [facility] [address] [address]  [phone] (p)  [phone] (f)

## 2024-01-30 NOTE — PROGRESS NOTES
Orthostatic blood pressure:    01/29/24 2215 01/29/24 2221 01/29/24 2228   Vital Signs   Pulse 74 71 80   BP (!) 174/74 (!) 190/80 (!) 186/81   BP Location Right arm Right arm Right arm   BP Method Automatic Automatic Automatic   Patient Position Lying Sitting Standing

## 2024-01-30 NOTE — PROGRESS NOTES
01/30/24 1635   Vital Signs   Temp 98 °F (36.7 °C)   Pulse 70   Resp 18   SpO2 98 %   /62   Post-Hemodialysis Assessment   Rinseback Volume (mL) 250 mL   Blood Volume Processed (Liters) 58 L   Dialyzer Clearance Lightly streaked   Duration of Treatment 180 minutes   Total UF (mL) 1500 mL   Net Fluid Removal 1000   Patient Response to Treatment tolerated well   Post-Treatment Weight 78 kg (171 lb 15.3 oz)   Treatment Weight Change -1.2   Post-Hemodialysis Comments no problems     Pt educated on fluid intake. Report given to Nikki ALEX

## 2024-01-30 NOTE — NURSING
Pt c/o feeling nauseated. Upon arrival to bedside clear emesis/mixture of food noted on floor. PRN IV Zofran administered. Pt repositioned and siting up in bed, NADN, continuing to monitor.

## 2024-01-30 NOTE — ASSESSMENT & PLAN NOTE
Patient has chronic left plantar medial foot and left medial 3rd toe diabetic foot ulcers  Patient follows outpatient with Dr. Bruno for wound care   Tight glucose control.  Follow up outpatient once discharged.  Wound care consulted , status post debridement on 01/30/2024. Per podiatry: Recommend dressing changes to consist of santyl followed by aquacel Ag followed by border foam

## 2024-01-30 NOTE — ASSESSMENT & PLAN NOTE
Bedside debridement, see procedure note    Recommend dressing changes to consist of santyl followed by aquacel Ag followed by border foam    Wilman and glucerna daily    Tight glucose control     Counseled patient on increasing protein intake, not getting wound wet, keeping dressing clean dry and intact, following a healthy diet, elevating legs when able, removing pressure from wound    Patient can follow up outpatient once discharged   No

## 2024-01-30 NOTE — CONSULTS
"[facility]  Podiatry  Consult Note    Patient Name: Leanna García  MRN: [id number]  Admission Date: 1/29/2024  Hospital Length of Stay: 0 days  Attending Physician: Shannan Varela MD  Primary Care Provider: Stefano Lopez MD     Consults  Subjective:     History of Present Illness:  "50-year-old female PMH significant for ESRD on Monday Wednesday Friday dialysis, T2 DM, anemia presents emergency room today for evaluation of syncope.  Syncope occurred while receiving dialysis.  Patient received 2.5 hours of 3 hours dialysis.  Patient tells me she does not recall the event.  She had no prodromal symptoms.  Denies chest pain or shortness of breath.  Currently feels fatigued but tells me that is not unusual after dialysis.  Pt has AV fistula on LUE and she was supposed to have a revision surgery on Feb 2024 because fistula is not working.  Pts Nephrologist is Dr Madie Ta MD  Pt established care with Dr STEVE Adame on October 2023"      Patient has a chronic left plantar foot diabetic foot ulcer. Podiatry was consulted for treatment.     Scheduled Meds:   amLODIPine  5 mg Oral Daily    aspirin  81 mg Oral Daily    atorvastatin  80 mg Oral QHS    cinacalcet  120 mg Oral BID WM    clopidogreL  75 mg Oral Daily    doxycycline  100 mg Oral BID    gabapentin  100 mg Oral BID    heparin (porcine)  5,000 Units Subcutaneous Q8H    minoxidiL  5 mg Oral BID    mupirocin   Nasal BID    pantoprazole  40 mg Oral Daily    sodium zirconium cyclosilicate  5 g Oral Once    sucroferric oxyhydroxide  2 tablet Oral TID     Continuous Infusions:  PRN Meds:acetaminophen, acetaminophen, dextrose 50%, dextrose 50%, glucagon (human recombinant), glucose, glucose, insulin aspart U-100, labetalol, melatonin, ondansetron, promethazine, sodium chloride 0.9%    Review of patient's allergies indicates:   Allergen Reactions    Dilaudid [hydromorphone] Nausea And Vomiting    Chlorhexidine Itching    Lortab " [hydrocodone-acetaminophen] Itching        Past Medical History:   Diagnosis Date    A-fib     resolved per patient    Anemia due to end stage renal disease 6/30/2022    Arthritis     Bronchitis     Cardiovascular event risk, ASCVD 10-year risk 6.7% 10/15/2016    Diabetes mellitus type II     Diabetic foot infection     Diabetic ulcer of left midfoot 05/05/2022    Disorder of kidney and ureter     Encounter for blood transfusion     ESRD (end stage renal disease) 05/18/2018    MANJINDER (generalized anxiety disorder) 10/25/2016    History of colon polyps 11/02/2016    Hyperlipidemia     Hypertension     Stroke      Past Surgical History:   Procedure Laterality Date    ANGIOGRAPHY OF LOWER EXTREMITY Left 10/18/2022    Procedure: Angiogram Extremity Unilateral;  Surgeon: Ali Khoobehi, MD;  Location: [facility] CATH/EP LAB;  Service: Vascular;  Laterality: Left;    AV FISTULA PLACEMENT Left 8/29/2022    Procedure: CREATION, AV FISTULA;  Surgeon: Ali Khoobehi, MD;  Location: [facility] OR;  Service: Vascular;  Laterality: Left;    BONE BIOPSY Left 8/24/2022    Procedure: Biopsy-Bone;  Surgeon: Porfirio Ponce DPM;  Location: [facility] OR;  Service: Podiatry;  Laterality: Left;    COLONOSCOPY N/A 10/5/2016    Procedure: COLONOSCOPY;  Surgeon: Pillo Chanel MD;  Location: [facility] ENDO;  Service: Endoscopy;  Laterality: N/A;    COLONOSCOPY N/A 4/26/2022    Procedure: COLONOSCOPY;  Surgeon: Saravaann Rachel MD;  Location: [facility] ENDO;  Service: Endoscopy;  Laterality: N/A;    FISTULOGRAM Left 8/24/2022    Procedure: Fistulogram;  Surgeon: Ali Khoobehi, MD;  Location: [facility] CATH/EP LAB;  Service: Vascular;  Laterality: Left;    HERNIA REPAIR Bilateral 11/22/2016    inguinal    HYSTERECTOMY      INCISION AND DRAINAGE FOOT Left 5/13/2022    Procedure: INCISION AND DRAINAGE, FOOT;  Surgeon: Ricardo Bruno DPM;  Location: [facility] OR;  Service: Podiatry;  Laterality: Left;    OOPHORECTOMY      THROMBECTOMY, AV FISTULA, UPPER EXTREMITY, PERCUTANEOUS   8/24/2022    Procedure: THROMBECTOMY, AV FISTULA, UPPER EXTREMITY, PERCUTANEOUS;  Surgeon: Ali Khoobehi, MD;  Location: [facility] CATH/EP LAB;  Service: Vascular;;    TOE AMPUTATION Left 8/26/2022    Procedure: AMPUTATION, TOE;  Surgeon: Porfirio Ponce DPM;  Location: [facility] OR;  Service: Podiatry;  Laterality: Left;       Family History       Problem Relation (Age of Onset)    Cancer Paternal Grandmother    Diabetes Father, Sister, Sister, Maternal Aunt, Maternal Grandmother    Heart disease Maternal Grandmother    Hyperlipidemia Mother    Hypertension Mother, Father          Tobacco Use    Smoking status: Never    Smokeless tobacco: Never   Substance and Sexual Activity    Alcohol use: No    Drug use: No    Sexual activity: Yes     Partners: Male     Review of Systems  Objective:     Vital Signs (Most Recent):  Temp: 97.8 °F (36.6 °C) (01/30/24 0803)  Pulse: 77 (01/30/24 1039)  Resp: 18 (01/30/24 0803)  BP: (!) 167/76 (01/30/24 1039)  SpO2: 100 % (01/30/24 0803) Vital Signs (24h Range):  Temp:  [97.7 °F (36.5 °C)-98 °F (36.7 °C)] 97.8 °F (36.6 °C)  Pulse:  [71-83] 77  Resp:  [18-20] 18  SpO2:  [95 %-100 %] 100 %  BP: (106-198)/(56-86) 167/76     Weight: 79 kg (174 lb 2.6 oz)  Body mass index is 27.28 kg/m².    Foot Exam    Pre and post debridement pictures. No signs of infection  Slough in wound base.          n  Laboratory:  All pertinent labs reviewed within the last 24 hours.    Diagnostic Results:  I have reviewed all pertinent imaging results/findings within the past 24 hours.    Assessment/Plan:     Orthopedic  Ulcer of left foot with fat layer exposed  Bedside debridement, see procedure note    Recommend dressing changes to consist of santyl followed by aquacel Ag followed by border foam    Wilman and glucerna daily    Tight glucose control     Counseled patient on increasing protein intake, not getting wound wet, keeping dressing clean dry and intact, following a healthy diet, elevating legs when able,  removing pressure from wound    Patient can follow up outpatient once discharged        Thank you for your consult. I will follow-up with patient. Please contact us if you have any additional questions.    Alivia Bruno DPM  Podiatry  [facility]

## 2024-01-30 NOTE — NURSING
Received phone call from dialysis nurse Dylan, requesting to dialyze pt today, per dialysis nurse consent will be signed per Dr. Soria when she arrives to floor.

## 2024-01-30 NOTE — ASSESSMENT & PLAN NOTE
Patient has ESRD and gets HD Monday/Wednesday/Friday   Patient completed 2.5 hours out of 3 hours of dialysis today     Patient on renal diet  Continue phospate binders    Nephrology consulted , one dose of lokelma today

## 2024-01-30 NOTE — PLAN OF CARE
Met with patient and Donovan García (Spouse) [phone] (Mobile)  at bedside to complete initial assessment. Patient / family reports patient DOES NOT have a living will and NO ONE is medical POA. Patient has dialysis at [facility] in [facility]  Initial Discharge Assessment       Primary Care Provider: Stefano Lopez MD    Admission Diagnosis: Syncope, unspecified syncope type [R55]    Admission Date: 1/29/2024  Expected Discharge Date: 1/31/2024    Transition of Care Barriers: (P) None    Payor: HU[employer] MANAGED MEDICARE / Plan: HU[employer] MEDICARE HMO / Product Type: Capitation /     Extended Emergency Contact Information  Primary Emergency Contact: Donovan García  Address: [address] [place] [address] [place]  Mobile Phone: [phone]  Relation: Spouse  Preferred language: English   needed? No  Secondary Emergency Contact: Sonia Cota  Address: [address] [place] [address] [place]  Mobile Phone: [phone]  Relation: Mother  Preferred language: English   needed? No    Discharge Plan A: (P) Home with family  Discharge Plan B: (P) Home with family      [place] [id number] [facility] [address] [facility]  [address] [email or web address] [place] [address]  Phone: [phone] Fax: [phone]      Initial Assessment (most recent)       Adult Discharge Assessment - 01/30/24 1445          Discharge Assessment    Assessment Type Discharge Planning Assessment (P)      Confirmed/corrected address, phone number and insurance Yes (P)      Confirmed Demographics Correct on Facesheet (P)      Source of Information patient (P)      Communicated ERI with patient/caregiver No (P)      Reason For Admission syncope (P)      People in Home spouse (P)      Facility Arrived From: HD center (P)      Do you expect to return to your current living situation? Yes (P)       Do you have help at home or someone to help you manage your care at home? Yes (P)      Who are your caregiver(s) and their phone number(s)? Donovan García (Spouse)  [phone] (Mobile) (P)      Current cognitive status: Alert/Oriented (P)      Walking or Climbing Stairs Difficulty yes (P)      Walking or Climbing Stairs ambulation difficulty, requires equipment (P)      Dressing/Bathing Difficulty no (P)      Equipment Currently Used at Home walker, rolling;wheelchair (P)      Readmission within 30 days? No (P)      Patient currently being followed by outpatient case management? No (P)      Do you currently have service(s) that help you manage your care at home? No (P)      Do you have prescription coverage? Yes (P)      Coverage [employer] (P)      Who is going to help you get home at discharge? Donovan García (Spouse)  [phone] (Mobile) (P)      How do you get to doctors appointments? car, drives self;family or friend will provide (P)      Are you on dialysis? Yes (P)      Dialysis Name and Scheduled days [facility] schedule (P)      Do you take coumadin? No (P)      Discharge Plan A Home with family (P)      Discharge Plan B Home with family (P)      DME Needed Upon Discharge  none (P)      Discharge Plan discussed with: Patient;Spouse/sig other (P)      Name(s) and Number(s) Donovan García (Spouse)  [phone] (Mobile) (P)      Transition of Care Barriers None (P)         OTHER    Name(s) of People in Home RaquelDonovan (Spouse)  [phone] (Mobile) (P)

## 2024-01-30 NOTE — SUBJECTIVE & OBJECTIVE
Interval History: pt had debridement of left foot ulcer today with Dr. Bruno. K 5.3.     Review of Systems   Constitutional:  Negative for chills and diaphoresis.   Cardiovascular:  Negative for chest pain and palpitations.   Gastrointestinal:  Negative for abdominal distention and abdominal pain.     Objective:     Vital Signs (Most Recent):  Temp: 97.8 °F (36.6 °C) (01/30/24 0803)  Pulse: 77 (01/30/24 1039)  Resp: 18 (01/30/24 0803)  BP: (!) 167/76 (01/30/24 1039)  SpO2: 100 % (01/30/24 0803) Vital Signs (24h Range):  Temp:  [97.7 °F (36.5 °C)-98 °F (36.7 °C)] 97.8 °F (36.6 °C)  Pulse:  [71-83] 77  Resp:  [18-20] 18  SpO2:  [95 %-100 %] 100 %  BP: (106-198)/(56-86) 167/76     Weight: 79 kg (174 lb 2.6 oz)  Body mass index is 27.28 kg/m².    Intake/Output Summary (Last 24 hours) at 1/30/2024 1204  Last data filed at 1/30/2024 1026  Gross per 24 hour   Intake 480 ml   Output --   Net 480 ml         Physical Exam  Vitals reviewed.   Pulmonary:      Effort: No respiratory distress.      Breath sounds: No wheezing.   Abdominal:      General: There is no distension.      Tenderness: There is no abdominal tenderness.   Skin:     Comments: Left ulcer covered in bandage   Neurological:      General: No focal deficit present.      Mental Status: She is alert and oriented to person, place, and time.             Significant Labs: All pertinent labs within the past 24 hours have been reviewed.  CBC:   Recent Labs   Lab 01/29/24  1454 01/30/24  0610   WBC 7.98 7.47   HGB 11.2* 10.2*   HCT 33.5* 31.6*    222     CMP:   Recent Labs   Lab 01/29/24  1454 01/30/24  0610    138   K 4.4 5.3*   CL 98 101   CO2 25 26   * 124*   BUN 57* 68*   CREATININE 4.6* 6.2*   CALCIUM 9.5 9.4   PROT 7.0  --    ALBUMIN 3.8  --    BILITOT 0.3  --    ALKPHOS 96  --    AST 19  --    ALT 22  --    ANIONGAP 13 11       Significant Imaging: I have reviewed all pertinent imaging results/findings within the past 24 hours.

## 2024-01-30 NOTE — HOSPITAL COURSE
Patient with history of ESRD on HD MWF, diabetes, hypertension, atrial fibrillation, chronic diastolic heart failure presents to the emergency room for questionable syncope.  Patient states that she became incoherent during dialysis after 2-1/2 hours, denies loss of consciousness although initial reports say that she did lose consciousness.  Head CT was negative.  Carotid ultrasound was negative for significant stenosis.  Echocardiogram on 01/23/2024 showed normal wall motion, ejection fraction 60-65%.  Troponin elevated likely 2/2 to demand from hypotension and pt with ESRD.  Nuclear stress test on 01/24/2024 showed no convincing evidence of pharmacologically induced reversible ischemia or infarct.. Nephro and wound care consulted. Pt hyperkalemic, underwent HD on 1/30 and 1/31. Given dose of lokelma. Vascular performed angiogram on 01/31 which showed stenosis, status post balloon angioplasty.  Patient instructed to follow up with vascular surgery and Podiatry outpatient.  Discussed with Nephrology, patient cleared for discharge.  Instructed patient to hold blood pressure medication morning of dialysis.  Patient medically stable for discharge.  Patient also instructed to follow up with Cardiology outpatient.      Physical Exam  Vitals reviewed.   Pulmonary:      Effort: No respiratory distress.      Breath sounds: No wheezing.   Abdominal:      General: There is no distension.      Tenderness: There is no abdominal tenderness.   Skin:     Comments: Left ulcer covered in bandage   Neurological:      General: No focal deficit present.      Mental Status: She is alert and oriented to person, place, and time.

## 2024-01-30 NOTE — PROGRESS NOTES
[facility]  Progress Note    Patient Name: Leanna García  MRN: [id number]  Patient Class: OP- Observation   Admission Date: 1/29/2024  Length of Stay: 0 days  Attending Physician: Shannan Varela MD  Primary Care Provider: Stefano Lopez MD        Subjective:     Principal Problem:Syncope        HPI:  57 y/o female with history of ESRD on HD Monday/Wednesday/Friday, DM, HTN, atrial fibrillation, MANJINDER, HLD, and chronic diastolic HF presents to the ED for a syncopal episode while at dialysis today. Patient states that she woke up this morning feeling completely normal and went to HD. Patient states that after 2.5 hours of HD she lost consciousness for a short time and was told that her LUE was shaking. Patient denies any prodrome and states that she does not remember the episode. When patient regained consciousness she states that she just felt fatigued like she typically does after dialysis. Patient was recently in the hospital for a cardiac workup after having chest pain and has established care with Dr. STEVE Adame for cardiology. Patient denies any chest pain, SOB, abdominal pain, nausea, vomiting, diarrhea, focal weakness, or dizziness. Code status was discussed and patient was placed as a full code at this time.     ED: Vitals showed HR 83, /74, RR 20 saturating at 98% on RA. Labs were significant for glucose 207, troponin 26, , and magnesium 2.1. CT head showed no acute changes.     Overview/Hospital Course:  Patient with history of ESRD on HD MWF, diabetes, hypertension, atrial fibrillation, chronic diastolic heart failure presents to the emergency room for questionable syncope.  Patient states that she became incoherent during dialysis after 2-1/2 hours, denies loss of consciousness although initial reports say that she did lose consciousness.  Head CT was negative.  Carotid ultrasound was negative for significant stenosis.  Echocardiogram on 01/23/2024 showed  normal wall motion, ejection fraction 60-65%.  Troponin up trending, we will continue to trend. Nephro and wound care consulted.     Interval History: pt had debridement of left foot ulcer today with Dr. Bruno. YAHAIRA 5.3.     Review of Systems   Constitutional:  Negative for chills and diaphoresis.   Cardiovascular:  Negative for chest pain and palpitations.   Gastrointestinal:  Negative for abdominal distention and abdominal pain.     Objective:     Vital Signs (Most Recent):  Temp: 97.8 °F (36.6 °C) (01/30/24 0803)  Pulse: 77 (01/30/24 1039)  Resp: 18 (01/30/24 0803)  BP: (!) 167/76 (01/30/24 1039)  SpO2: 100 % (01/30/24 0803) Vital Signs (24h Range):  Temp:  [97.7 °F (36.5 °C)-98 °F (36.7 °C)] 97.8 °F (36.6 °C)  Pulse:  [71-83] 77  Resp:  [18-20] 18  SpO2:  [95 %-100 %] 100 %  BP: (106-198)/(56-86) 167/76     Weight: 79 kg (174 lb 2.6 oz)  Body mass index is 27.28 kg/m².    Intake/Output Summary (Last 24 hours) at 1/30/2024 1204  Last data filed at 1/30/2024 1026  Gross per 24 hour   Intake 480 ml   Output --   Net 480 ml         Physical Exam  Vitals reviewed.   Pulmonary:      Effort: No respiratory distress.      Breath sounds: No wheezing.   Abdominal:      General: There is no distension.      Tenderness: There is no abdominal tenderness.   Skin:     Comments: Left ulcer covered in bandage   Neurological:      General: No focal deficit present.      Mental Status: She is alert and oriented to person, place, and time.             Significant Labs: All pertinent labs within the past 24 hours have been reviewed.  CBC:   Recent Labs   Lab 01/29/24  1454 01/30/24  0610   WBC 7.98 7.47   HGB 11.2* 10.2*   HCT 33.5* 31.6*    222     CMP:   Recent Labs   Lab 01/29/24  1454 01/30/24  0610    138   K 4.4 5.3*   CL 98 101   CO2 25 26   * 124*   BUN 57* 68*   CREATININE 4.6* 6.2*   CALCIUM 9.5 9.4   PROT 7.0  --    ALBUMIN 3.8  --    BILITOT 0.3  --    ALKPHOS 96  --    AST 19  --    ALT 22  --     ANIONGAP 13 11       Significant Imaging: I have reviewed all pertinent imaging results/findings within the past 24 hours.    Assessment/Plan:      * Syncope  Stories are inconsistent, patient reports being alert but incoherent during episode.  Denies syncope although initial reports report loss of consciousness.    Orthostatic vitals unremarkable  CT head: No acute intracranial abnormalities. Chronic findings as discussed above.     Carotid US ordered   ECHO done 1/22/2024    Left Ventricle: The left ventricle is normal in size. Mildly increased wall thickness. There is mild concentric hypertrophy. Normal wall motion. There is normal systolic function with a visually estimated ejection fraction of 60 - 65%. There is normal diastolic function.    Right Ventricle: Normal right ventricular cavity size. Wall thickness is normal. Right ventricle wall motion  is normal. Systolic function is normal.    Aortic Valve: There is mild aortic valve sclerosis.    Mitral Valve: There is mild regurgitation with a centrally directed jet.    IVC/SVC: Normal venous pressure at 3 mmHg.    Pericardium: There is a trivial posterior effusion. No indication of cardiac tamponade.    Fall precautions in place  Seizure precautions in place    Carotid ultrasounds showed no significant stenosis, possible discharge home tomorrow if remained stable after dialysis    Ulcer of left foot with fat layer exposed  Status post debridement 01/30/2024      Diabetic ulcer of left midfoot associated with type 2 diabetes mellitus  Patient has chronic left plantar medial foot and left medial 3rd toe diabetic foot ulcers  Patient follows outpatient with Dr. Bruno for wound care   Tight glucose control.  Follow up outpatient once discharged.  Wound care consulted , status post debridement on 01/30/2024. Per podiatry: Recommend dressing changes to consist of santyl followed by aquacel Ag followed by border foam       Type 2 diabetes mellitus  Patient started  on diabetic diet   Last A1c (6/6/2023) 8.3  Hold Oral hypoglycemics while patient is in the hospital.  Sliding scale insulin ordered  Continue to monitor blood glucose with POC glucose checks     ESRD (end stage renal disease)  Patient has ESRD and gets HD Monday/Wednesday/Friday   Patient completed 2.5 hours out of 3 hours of dialysis today     Patient on renal diet  Continue phospate binders    Nephrology consulted , one dose of lokelma today    Chronic diastolic heart failure  Patient has chronic diastolic heart failure   Last ECHO (1/22/2024)    Left Ventricle: The left ventricle is normal in size. Mildly increased wall thickness. There is mild concentric hypertrophy. Normal wall motion. There is normal systolic function with a visually estimated ejection fraction of 60 - 65%. There is normal diastolic function.    Right Ventricle: Normal right ventricular cavity size. Wall thickness is normal. Right ventricle wall motion  is normal. Systolic function is normal.    Aortic Valve: There is mild aortic valve sclerosis.    Mitral Valve: There is mild regurgitation with a centrally directed jet.    IVC/SVC: Normal venous pressure at 3 mmHg.    Pericardium: There is a trivial posterior effusion. No indication of cardiac tamponade.    Monitor intake and output  Monitor daily weights           Hypertension associated with diabetes  Continue home regimen as tolerated   Continue to monitor vitals       Hyperlipidemia associated with type 2 diabetes mellitus  Continue home regimen         VTE Risk Mitigation (From admission, onward)           Ordered     heparin (porcine) injection 5,000 Units  Every 8 hours         01/29/24 1657     IP VTE HIGH RISK PATIENT  Once         01/29/24 1657     Place sequential compression device  Until discontinued         01/29/24 1657                    Discharge Planning   ERI: 1/31/2024     Code Status: Full Code   Is the patient medically ready for discharge?:     Reason for patient still  in hospital (select all that apply): Patient trending condition                     Shannan Varela MD  Department of Hospital Medicine   [facility]

## 2024-01-30 NOTE — PROCEDURES
"Leanna García is a 58 y.o. female patient.    Temp: 97.8 °F (36.6 °C) (01/30/24 0803)  Pulse: 77 (01/30/24 1039)  Resp: 18 (01/30/24 0803)  BP: (!) 167/76 (01/30/24 1039)  SpO2: 100 % (01/30/24 0803)  Weight: 79 kg (174 lb 2.6 oz) (01/30/24 0700)  Height: 5' 7" (170.2 cm) (01/30/24 0700)       Debridement    Date/Time: 1/30/2024 11:04 AM    Performed by: Alivia Bruno DPM  Authorized by: Alivia Bruno DPM    Time out: Immediately prior to procedure a "time out" was called to verify the correct patient, procedure, equipment, support staff and site/side marked as required.    Consent Done?:  Yes (Verbal)    Preparation: Patient was prepped and draped in usual sterile fashion, Patient was prepped and draped with aseptic technique and Patient was prepped and draped with clean technique    Local anesthesia used?: No      Wound Details:    Location:  Left foot    Location:  Left Plantar    Type of Debridement:  Excisional       Length (cm):  1       Area (sq cm):  0.8       Width (cm):  0.8       Percent Debrided (%):  100       Depth (cm):  0.2       Total Area Debrided (sq cm):  0.8    Depth of debridement:  Subcutaneous tissue    Tissue debrided:  Subcutaneous    Devitalized tissue debrided:  Biofilm, Slough and Callus    Instruments:  Forceps, Blade and Curette  Bleeding:  Minimal  Hemostasis Achieved: Yes  Method Used:  Pressure  Patient tolerance:  Patient tolerated the procedure well with no immediate complications  1st Wound Pain Assessment: 0      1/30/2024    "

## 2024-01-30 NOTE — PLAN OF CARE
Met with patient at bedside, explained Medicare Outpatient Observation Notice (MOON), and left Q&A information sheet at bedside. MOON form scanned into media manager.       01/30/24 6929   ERICKSON Message   Medicare Outpatient and Observation Notification regarding financial responsibility Given to patient/caregiver;Explained to patient/caregiver;Signed/date by patient/caregiver   Date ERICKSON was signed 01/30/24   Time ERICKSON was signed [id number]

## 2024-01-30 NOTE — SUBJECTIVE & OBJECTIVE
Scheduled Meds:   amLODIPine  5 mg Oral Daily    aspirin  81 mg Oral Daily    atorvastatin  80 mg Oral QHS    cinacalcet  120 mg Oral BID WM    clopidogreL  75 mg Oral Daily    doxycycline  100 mg Oral BID    gabapentin  100 mg Oral BID    heparin (porcine)  5,000 Units Subcutaneous Q8H    minoxidiL  5 mg Oral BID    mupirocin   Nasal BID    pantoprazole  40 mg Oral Daily    sodium zirconium cyclosilicate  5 g Oral Once    sucroferric oxyhydroxide  2 tablet Oral TID     Continuous Infusions:  PRN Meds:acetaminophen, acetaminophen, dextrose 50%, dextrose 50%, glucagon (human recombinant), glucose, glucose, insulin aspart U-100, labetalol, melatonin, ondansetron, promethazine, sodium chloride 0.9%    Review of patient's allergies indicates:   Allergen Reactions    Dilaudid [hydromorphone] Nausea And Vomiting    Chlorhexidine Itching    Lortab [hydrocodone-acetaminophen] Itching        Past Medical History:   Diagnosis Date    A-fib     resolved per patient    Anemia due to end stage renal disease 6/30/2022    Arthritis     Bronchitis     Cardiovascular event risk, ASCVD 10-year risk 6.7% 10/15/2016    Diabetes mellitus type II     Diabetic foot infection     Diabetic ulcer of left midfoot 05/05/2022    Disorder of kidney and ureter     Encounter for blood transfusion     ESRD (end stage renal disease) 05/18/2018    MANJINDER (generalized anxiety disorder) 10/25/2016    History of colon polyps 11/02/2016    Hyperlipidemia     Hypertension     Stroke      Past Surgical History:   Procedure Laterality Date    ANGIOGRAPHY OF LOWER EXTREMITY Left 10/18/2022    Procedure: Angiogram Extremity Unilateral;  Surgeon: Ali Khoobehi, MD;  Location: [facility] CATH/EP LAB;  Service: Vascular;  Laterality: Left;    AV FISTULA PLACEMENT Left 8/29/2022    Procedure: CREATION, AV FISTULA;  Surgeon: Ali Khoobehi, MD;  Location: [facility] OR;  Service: Vascular;  Laterality: Left;    BONE BIOPSY Left 8/24/2022    Procedure: Biopsy-Bone;  Surgeon: Porfirio  VICKY Ponce DPM;  Location: [facility] OR;  Service: Podiatry;  Laterality: Left;    COLONOSCOPY N/A 10/5/2016    Procedure: COLONOSCOPY;  Surgeon: Pillo Chanel MD;  Location: [facility] ENDO;  Service: Endoscopy;  Laterality: N/A;    COLONOSCOPY N/A 4/26/2022    Procedure: COLONOSCOPY;  Surgeon: Saravanan Rachel MD;  Location: [facility] ENDO;  Service: Endoscopy;  Laterality: N/A;    FISTULOGRAM Left 8/24/2022    Procedure: Fistulogram;  Surgeon: Ali Khoobehi, MD;  Location: [facility] CATH/EP LAB;  Service: Vascular;  Laterality: Left;    HERNIA REPAIR Bilateral 11/22/2016    inguinal    HYSTERECTOMY      INCISION AND DRAINAGE FOOT Left 5/13/2022    Procedure: INCISION AND DRAINAGE, FOOT;  Surgeon: Ricardo Bruno DPM;  Location: [facility] OR;  Service: Podiatry;  Laterality: Left;    OOPHORECTOMY      THROMBECTOMY, AV FISTULA, UPPER EXTREMITY, PERCUTANEOUS  8/24/2022    Procedure: THROMBECTOMY, AV FISTULA, UPPER EXTREMITY, PERCUTANEOUS;  Surgeon: Ali Khoobehi, MD;  Location: [facility] CATH/EP LAB;  Service: Vascular;;    TOE AMPUTATION Left 8/26/2022    Procedure: AMPUTATION, TOE;  Surgeon: Porfirio Ponce DPM;  Location: [facility] OR;  Service: Podiatry;  Laterality: Left;       Family History       Problem Relation (Age of Onset)    Cancer Paternal Grandmother    Diabetes Father, Sister, Sister, Maternal Aunt, Maternal Grandmother    Heart disease Maternal Grandmother    Hyperlipidemia Mother    Hypertension Mother, Father          Tobacco Use    Smoking status: Never    Smokeless tobacco: Never   Substance and Sexual Activity    Alcohol use: No    Drug use: No    Sexual activity: Yes     Partners: Male     Review of Systems  Objective:     Vital Signs (Most Recent):  Temp: 97.8 °F (36.6 °C) (01/30/24 0803)  Pulse: 77 (01/30/24 1039)  Resp: 18 (01/30/24 0803)  BP: (!) 167/76 (01/30/24 1039)  SpO2: 100 % (01/30/24 0803) Vital Signs (24h Range):  Temp:  [97.7 °F (36.5 °C)-98 °F (36.7 °C)] 97.8 °F (36.6 °C)  Pulse:  [71-83] 77  Resp:  [18-20]  18  SpO2:  [95 %-100 %] 100 %  BP: (106-198)/(56-86) 167/76     Weight: 79 kg (174 lb 2.6 oz)  Body mass index is 27.28 kg/m².    Foot Exam    Pre and post debridement pictures. No signs of infection  Slough in wound base.          n  Laboratory:  All pertinent labs reviewed within the last 24 hours.    Diagnostic Results:  I have reviewed all pertinent imaging results/findings within the past 24 hours.

## 2024-01-31 ENCOUNTER — TELEPHONE (OUTPATIENT)
Dept: CARDIOLOGY | Facility: CLINIC | Age: 59
End: 2024-01-31
Payer: MEDICARE

## 2024-01-31 VITALS
DIASTOLIC BLOOD PRESSURE: 50 MMHG | RESPIRATION RATE: 18 BRPM | HEIGHT: 67 IN | WEIGHT: 174.19 LBS | BODY MASS INDEX: 27.34 KG/M2 | HEART RATE: 75 BPM | TEMPERATURE: 98 F | SYSTOLIC BLOOD PRESSURE: 129 MMHG | OXYGEN SATURATION: 97 %

## 2024-01-31 PROBLEM — L97.522 ULCER OF LEFT FOOT WITH FAT LAYER EXPOSED: Status: RESOLVED | Noted: 2024-01-30 | Resolved: 2024-01-31

## 2024-01-31 PROBLEM — R55 SYNCOPE: Status: RESOLVED | Noted: 2022-11-02 | Resolved: 2024-01-31

## 2024-01-31 LAB
ANION GAP SERPL CALC-SCNC: 8 MMOL/L (ref 8–16)
BASOPHILS # BLD AUTO: 0.05 K/UL (ref 0–0.2)
BASOPHILS NFR BLD: 0.7 % (ref 0–1.9)
BUN SERPL-MCNC: 57 MG/DL (ref 6–20)
CALCIUM SERPL-MCNC: 8.4 MG/DL (ref 8.7–10.5)
CHLORIDE SERPL-SCNC: 105 MMOL/L (ref 95–110)
CO2 SERPL-SCNC: 24 MMOL/L (ref 23–29)
CREAT SERPL-MCNC: 6 MG/DL (ref 0.5–1.4)
DIFFERENTIAL METHOD BLD: ABNORMAL
EOSINOPHIL # BLD AUTO: 0.4 K/UL (ref 0–0.5)
EOSINOPHIL NFR BLD: 5.2 % (ref 0–8)
ERYTHROCYTE [DISTWIDTH] IN BLOOD BY AUTOMATED COUNT: 15.1 % (ref 11.5–14.5)
EST. GFR  (NO RACE VARIABLE): 7.6 ML/MIN/1.73 M^2
GLUCOSE SERPL-MCNC: 100 MG/DL (ref 70–110)
GLUCOSE SERPL-MCNC: 108 MG/DL (ref 70–110)
HCT VFR BLD AUTO: 32.1 % (ref 37–48.5)
HGB BLD-MCNC: 10 G/DL (ref 12–16)
IMM GRANULOCYTES # BLD AUTO: 0.04 K/UL (ref 0–0.04)
IMM GRANULOCYTES NFR BLD AUTO: 0.5 % (ref 0–0.5)
LYMPHOCYTES # BLD AUTO: 2.3 K/UL (ref 1–4.8)
LYMPHOCYTES NFR BLD: 30.7 % (ref 18–48)
MCH RBC QN AUTO: 28 PG (ref 27–31)
MCHC RBC AUTO-ENTMCNC: 31.2 G/DL (ref 32–36)
MCV RBC AUTO: 90 FL (ref 82–98)
MONOCYTES # BLD AUTO: 0.7 K/UL (ref 0.3–1)
MONOCYTES NFR BLD: 9.7 % (ref 4–15)
NEUTROPHILS # BLD AUTO: 4 K/UL (ref 1.8–7.7)
NEUTROPHILS NFR BLD: 53.2 % (ref 38–73)
NRBC BLD-RTO: 0 /100 WBC
PLATELET # BLD AUTO: 229 K/UL (ref 150–450)
PMV BLD AUTO: 10.6 FL (ref 9.2–12.9)
POTASSIUM SERPL-SCNC: 5.7 MMOL/L (ref 3.5–5.1)
RBC # BLD AUTO: 3.57 M/UL (ref 4–5.4)
SODIUM SERPL-SCNC: 137 MMOL/L (ref 136–145)
TROPONIN I SERPL HS-MCNC: 165.4 PG/ML (ref 0–14.9)
TROPONIN I SERPL HS-MCNC: 179.8 PG/ML (ref 0–14.9)
WBC # BLD AUTO: 7.55 K/UL (ref 3.9–12.7)

## 2024-01-31 PROCEDURE — 36415 COLL VENOUS BLD VENIPUNCTURE: CPT | Performed by: STUDENT IN AN ORGANIZED HEALTH CARE EDUCATION/TRAINING PROGRAM

## 2024-01-31 PROCEDURE — 85025 COMPLETE CBC W/AUTO DIFF WBC: CPT

## 2024-01-31 PROCEDURE — 80048 BASIC METABOLIC PNL TOTAL CA: CPT

## 2024-01-31 PROCEDURE — 63600175 PHARM REV CODE 636 W HCPCS: Performed by: SURGERY

## 2024-01-31 PROCEDURE — 36902 INTRO CATH DIALYSIS CIRCUIT: CPT | Mod: LT | Performed by: SURGERY

## 2024-01-31 PROCEDURE — 25000003 PHARM REV CODE 250: Performed by: SURGERY

## 2024-01-31 PROCEDURE — 99153 MOD SED SAME PHYS/QHP EA: CPT | Performed by: SURGERY

## 2024-01-31 PROCEDURE — C1725 CATH, TRANSLUMIN NON-LASER: HCPCS | Performed by: SURGERY

## 2024-01-31 PROCEDURE — 90935 HEMODIALYSIS ONE EVALUATION: CPT

## 2024-01-31 PROCEDURE — 25500020 PHARM REV CODE 255: Performed by: SURGERY

## 2024-01-31 PROCEDURE — 037Y3ZZ DILATION OF UPPER ARTERY, PERCUTANEOUS APPROACH: ICD-10-PCS | Performed by: SURGERY

## 2024-01-31 PROCEDURE — 99232 SBSQ HOSP IP/OBS MODERATE 35: CPT | Mod: ,,, | Performed by: PODIATRIST

## 2024-01-31 PROCEDURE — 99152 MOD SED SAME PHYS/QHP 5/>YRS: CPT | Performed by: SURGERY

## 2024-01-31 PROCEDURE — 84484 ASSAY OF TROPONIN QUANT: CPT | Performed by: STUDENT IN AN ORGANIZED HEALTH CARE EDUCATION/TRAINING PROGRAM

## 2024-01-31 PROCEDURE — B51W1ZZ FLUOROSCOPY OF DIALYSIS SHUNT/FISTULA USING LOW OSMOLAR CONTRAST: ICD-10-PCS | Performed by: SURGERY

## 2024-01-31 PROCEDURE — 057F3ZZ DILATION OF LEFT CEPHALIC VEIN, PERCUTANEOUS APPROACH: ICD-10-PCS | Performed by: SURGERY

## 2024-01-31 PROCEDURE — 25000003 PHARM REV CODE 250

## 2024-01-31 PROCEDURE — C1769 GUIDE WIRE: HCPCS | Performed by: SURGERY

## 2024-01-31 PROCEDURE — 63600175 PHARM REV CODE 636 W HCPCS

## 2024-01-31 PROCEDURE — C1894 INTRO/SHEATH, NON-LASER: HCPCS | Performed by: SURGERY

## 2024-01-31 PROCEDURE — 25000003 PHARM REV CODE 250: Performed by: NURSE PRACTITIONER

## 2024-01-31 RX ORDER — IODIXANOL 320 MG/ML
INJECTION, SOLUTION INTRAVASCULAR
Status: DISCONTINUED | OUTPATIENT
Start: 2024-01-31 | End: 2024-01-31 | Stop reason: HOSPADM

## 2024-01-31 RX ORDER — FENTANYL CITRATE 50 UG/ML
INJECTION, SOLUTION INTRAMUSCULAR; INTRAVENOUS
Status: DISCONTINUED | OUTPATIENT
Start: 2024-01-31 | End: 2024-01-31 | Stop reason: HOSPADM

## 2024-01-31 RX ORDER — MIDAZOLAM HYDROCHLORIDE 1 MG/ML
INJECTION INTRAMUSCULAR; INTRAVENOUS
Status: DISCONTINUED | OUTPATIENT
Start: 2024-01-31 | End: 2024-01-31 | Stop reason: HOSPADM

## 2024-01-31 RX ORDER — LIDOCAINE HYDROCHLORIDE 10 MG/ML
INJECTION INFILTRATION; PERINEURAL
Status: DISCONTINUED | OUTPATIENT
Start: 2024-01-31 | End: 2024-01-31 | Stop reason: HOSPADM

## 2024-01-31 RX ADMIN — ASPIRIN 81 MG: 81 TABLET, COATED ORAL at 09:01

## 2024-01-31 RX ADMIN — CLOPIDOGREL BISULFATE 75 MG: 75 TABLET, FILM COATED ORAL at 09:01

## 2024-01-31 RX ADMIN — GABAPENTIN 100 MG: 100 CAPSULE ORAL at 09:01

## 2024-01-31 RX ADMIN — DOXYCYCLINE HYCLATE 100 MG: 100 CAPSULE ORAL at 09:01

## 2024-01-31 RX ADMIN — ACETAMINOPHEN 650 MG: 325 TABLET ORAL at 01:01

## 2024-01-31 RX ADMIN — PANTOPRAZOLE SODIUM 40 MG: 40 TABLET, DELAYED RELEASE ORAL at 09:01

## 2024-01-31 RX ADMIN — MUPIROCIN 1 G: 20 OINTMENT TOPICAL at 09:01

## 2024-01-31 RX ADMIN — ACETAMINOPHEN 650 MG: 325 TABLET ORAL at 09:01

## 2024-01-31 RX ADMIN — CINACALCET 120 MG: 30 TABLET, FILM COATED ORAL at 05:01

## 2024-01-31 RX ADMIN — CINACALCET 120 MG: 30 TABLET, FILM COATED ORAL at 09:01

## 2024-01-31 NOTE — PLAN OF CARE
Patient cleared for discharge from case management standpoint.    Chart and discharge orders reviewed.  Patient discharged home with no further case management needs.       01/31/24 1738   Final Note   Assessment Type Final Discharge Note   Anticipated Discharge Disposition Home   Post-Acute Status   Discharge Delays None known at this time

## 2024-01-31 NOTE — NURSING
Pt arrived back to unit via stretcher, v/s obtained. Incision site assessed, dressing C/D/I. Bruit and thrill present to LEXIE. Pt denies pain. NADN, POC ongoing.

## 2024-01-31 NOTE — ASSESSMENT & PLAN NOTE
Patient has ESRD and gets HD Monday/Wednesday/Friday   Patient completed 2.5 hours out of 3 hours of dialysis today     Patient on renal diet  Continue phospate binders    Nephrology consulted , one dose of lokelma today  Underwent fistulogram today, status post balloon angioplasty

## 2024-01-31 NOTE — PROGRESS NOTES
INPATIENT NEPHROLOGY Progress  [facility]    Patient Name: Leanna García  Date: 01/31/2024    Reason for consultation: ESRD    Chief Complaint:   Chief Complaint   Patient presents with    Loss of Consciousness     Syncopal episode while at dialysis. Pt finished 2.5 hour of 3 hr dialysis. Goes to Dialysis Monday/Wednesday/Friday. Pt complains of feeling tired but states she always feels like this after dialysis. No further complaints. Denies any chest pain or SOB        History of Present Illness:  ESRD patient on HD MWF who came from dialysis after developing syncopal episode 2.5 hours into HD- unresponsive- doesn't remember much- says something similar happened 1 week ago- no exac/reliev factors. Consulted for dialysis.    Interval History:  1/29- comfortable appearing, trop improved from prior, BNP stable from prior, echo and nuclear stress test last week neg, CT head neg, CXR clear  1/30  VSS, K+ a little high today.  Will order a dose of lokelma Had HD yesterday, debbi about 2.5 hrs.  1/31 s/p fistulogram for stenosis of AVF inflow- successful angioplasty- HD today, will use access    Plan of Care:    Assessment:  Syncope  ESRD on HD MWF  Hyperkalemia  HTN  SHPT  Anemia of CKD  AVF stenosis    Plan:    - concern for pre syncope/syncope related to BP meds vs too much fluid off- will touch base with outpatient unit  - continue HD MWF  - continue home BP meds- renal diet, 1.5L fluid restriction- hold BP meds morning of HD  - continue binders with meals  - continue FERMÍN with routine HD  - s/p angioplasty today    Thank you for allowing us to participate in this patient's care. We will continue to follow.    Vital Signs:  Temp Readings from Last 3 Encounters:   01/31/24 97.8 °F (36.6 °C) (Oral)   01/24/24 98.1 °F (36.7 °C)   01/16/24 98.5 °F (36.9 °C)       Pulse Readings from Last 3 Encounters:   01/31/24 70   01/24/24 77   01/16/24 88       BP Readings from Last 3 Encounters:   01/31/24 133/68    01/24/24 (!) 123/55   01/16/24 (!) 190/108       Weight:  Wt Readings from Last 3 Encounters:   01/30/24 79 kg (174 lb 2.6 oz)   01/22/24 78.7 kg (173 lb 8 oz)   01/23/24 78.7 kg (173 lb 8 oz)     Medications:  Scheduled Meds:  Continuous Infusions:  PRN Meds:.  No current facility-administered medications on file prior to encounter.     Current Outpatient Medications on File Prior to Encounter   Medication Sig Dispense Refill    amLODIPine (NORVASC) 5 MG tablet Take 1 tablet by mouth once daily.      aspirin (ECOTRIN) 81 MG EC tablet Take 1 tablet (81 mg total) by mouth once daily.  0    atorvastatin (LIPITOR) 80 MG tablet Take 1 tablet (80 mg total) by mouth once daily. 90 tablet 3    cinacalcet (SENSIPAR) 60 MG Tab Take 2 tablets by mouth 2 (two) times daily with meals.      clopidogreL (PLAVIX) 75 mg tablet Take 1 tablet (75 mg total) by mouth once daily. 90 tablet 3    doxycycline (VIBRAMYCIN) 100 MG Cap Take 100 mg by mouth 2 (two) times daily.      gabapentin (NEURONTIN) 100 MG capsule Take 1 capsule (100 mg total) by mouth 2 (two) times daily. 180 capsule 3    minoxidiL (LONITEN) 2.5 MG tablet Take 2 tablets by mouth 2 (two) times daily.      multivitamin (THERAGRAN) per tablet Take 1 tablet by mouth once daily.      OZEMPIC 0.25 mg or 0.5 mg (2 mg/3 mL) pen injector INJECT 0.25 MG SUBCUTANEOUSLY ONCE A WEEK (Patient taking differently: Inject 0.25 mg into the skin every 7 days. INJECT 0.25 MG SUBCUTANEOUSLY ONCE A WEEK) 1.5 mL 2    pantoprazole (PROTONIX) 40 MG tablet Take 1 tablet (40 mg total) by mouth once daily. 30 tablet 2    sucroferric oxyhydroxide (VELPHORO) 500 mg Chew Take 2 tablets by mouth 3 (three) times daily.      acetaminophen (TYLENOL) 325 MG tablet Take 325 mg by mouth every 6 (six) hours.      albuterol (PROVENTIL/VENTOLIN HFA) 90 mcg/actuation inhaler Inhale 2 puffs into the lungs every 4 to 6 hours as needed for Shortness of Breath or Wheezing.      ALPRAZolam (XANAX) 0.5 MG tablet  "Take 1 tablet by mouth.      blood sugar diagnostic Strp To check BG 4 times daily, to use with insurance preferred meter 450 strip 3    blood-glucose meter kit To check BG 3 times daily, to use with insurance preferred meter 1 each 0    cetirizine (ZYRTEC) 10 MG tablet Take 1 tablet (10 mg total) by mouth every other day. 45 tablet 3    epoetin chris-epbx (RETACRIT) 4,000 unit/mL injection Inject 1.11 mLs (4,440 Units total) into the skin every Mon, Wed, Fri.      hydrALAZINE (APRESOLINE) 25 MG tablet Take 1 tablet (25 mg total) by mouth 2 (two) times daily as needed (Systolic blood pressure > 170 mm Hg). (Patient not taking: Reported on 1/25/2024)      lancets Misc To check BG 1 times daily, to use with insurance preferred meter 100 each 11    lancets Misc To check BG 3 times daily, to use with insurance preferred meter 100 each 1    nitroGLYCERIN (NITROSTAT) 0.4 MG SL tablet Place 1 tablet (0.4 mg total) under the tongue every 5 (five) minutes as needed for Chest pain. (Patient not taking: Reported on 1/25/2024) 30 tablet 0    pen needle, diabetic (BD ULTRA-FINE ROBERT PEN NEEDLE) 32 gauge x 5/32" Ndle 1 pen by Misc.(Non-Drug; Combo Route) route 4 (four) times daily. To use 4 times per day with insulin injections. 100 each 1    [DISCONTINUED] clorazepate (TRANXENE) 3.75 MG Tab Take 7.5 mg by mouth nightly as needed.       [DISCONTINUED] dextrose (GLUCOSE GEL) 40 % gel Take 37.5 mLs (15,000 mg total) by mouth once as needed (hypoglycemia). 37.5 g 4    [DISCONTINUED] ezetimibe (ZETIA) 10 mg tablet Take 1 tablet (10 mg total) by mouth once daily.      [DISCONTINUED] fenofibrate 160 MG Tab Take 1 tablet (160 mg total) by mouth once daily. 90 tablet 3    [DISCONTINUED] lancing device with lancets (ACCU-CHEK SOFT DEV LANCETS) Kit To check blood sugars 4 times per day 400 each 3       Review of Systems:  Neg    Physical Exam:  General Appearance:    NAD, AAO x 3, cooperative, appears stated age   Head:    Normocephalic, " atraumatic   Eyes:    PER, EOMI, and conjunctiva/sclera clear bilaterally       Mouth:   Moist mucus membranes, no thrush or oral lesions,       normal dentition   Back:     No CVA tenderness   Lungs:     Clear to auscultation bilaterally, no wheezes, crackles,           rales or rhonchi, symmetric air movement, respirations unlabored   Chest wall:    No tenderness or deformity   Heart:    Regular rate and rhythm, S1 and S2 normal, no murmur, rub   or gallop   Abdomen:     Soft, non-tender, non-distended, bowel sounds active all four   quadrants, no RT or guarding, no masses, no organomegaly   Extremities:   Warm and well perfused, distal pulses are intact, no             cyanosis or peripheral edema   MSK:   No joint or muscle swelling, tenderness or deformity   Skin:   Skin color, texture, turgor normal, no rashes or lesions   Neurologic/Psychiatric:   CNII-XII intact, normal strength and sensation       throughout, no asterixis; normal affect, memory, judgement     and insight      Results:  Lab Results   Component Value Date     01/31/2024    K 5.7 (H) 01/31/2024     01/31/2024    CO2 24 01/31/2024    BUN 57 (H) 01/31/2024    CREATININE 6.0 (H) 01/31/2024    CALCIUM 8.4 (L) 01/31/2024    ANIONGAP 8 01/31/2024    ESTGFRAFRICA 5 (A) 07/29/2022    EGFRNONAA 5 (A) 07/29/2022       Lab Results   Component Value Date    CALCIUM 8.4 (L) 01/31/2024    PHOS 7.6 (H) 11/03/2022       Recent Labs   Lab 01/31/24  0531   WBC 7.55   RBC 3.57*   HGB 10.0*   HCT 32.1*      MCV 90   MCH 28.0   MCHC 31.2*         I have personally reviewed pertinent radiological imaging and reports.    I have spent > 35 minutes providing care for this patient for the above diagnoses. These services have included chart/data/imaging review, evaluation, exam, formulation of plan, , note preparation, and discussions with staff involved in this patient's care.    Ashleigh Soria MD MPH  [facility]  [phone] [place] [place] [address]  [phone] (p)  [phone] (f)

## 2024-01-31 NOTE — PROGRESS NOTES
[facility]  Progress Note    Patient Name: Leanna García  MRN: [id number]  Patient Class: IP- Inpatient   Admission Date: 1/29/2024  Length of Stay: 1 days  Attending Physician: Shannan Varela MD  Primary Care Provider: Stefano Lopez MD        Subjective:     Principal Problem:Syncope        HPI:  59 y/o female with history of ESRD on HD Monday/Wednesday/Friday, DM, HTN, atrial fibrillation, MANJINDER, HLD, and chronic diastolic HF presents to the ED for a syncopal episode while at dialysis today. Patient states that she woke up this morning feeling completely normal and went to HD. Patient states that after 2.5 hours of HD she lost consciousness for a short time and was told that her LUE was shaking. Patient denies any prodrome and states that she does not remember the episode. When patient regained consciousness she states that she just felt fatigued like she typically does after dialysis. Patient was recently in the hospital for a cardiac workup after having chest pain and has established care with Dr. STEVE Adame for cardiology. Patient denies any chest pain, SOB, abdominal pain, nausea, vomiting, diarrhea, focal weakness, or dizziness. Code status was discussed and patient was placed as a full code at this time.     ED: Vitals showed HR 83, /74, RR 20 saturating at 98% on RA. Labs were significant for glucose 207, troponin 26, , and magnesium 2.1. CT head showed no acute changes.     Overview/Hospital Course:  Patient with history of ESRD on HD MWF, diabetes, hypertension, atrial fibrillation, chronic diastolic heart failure presents to the emergency room for questionable syncope.  Patient states that she became incoherent during dialysis after 2-1/2 hours, denies loss of consciousness although initial reports say that she did lose consciousness.  Head CT was negative.  Carotid ultrasound was negative for significant stenosis.  Echocardiogram on 01/23/2024 showed  normal wall motion, ejection fraction 60-65%.  Troponin elevated likely 2/2 to demand from hypotension and pt with ESRD.  Nuclear stress test on 01/24/2024 showed no convincing evidence of pharmacologically induced reversible ischemia or infarct.. Nephro and wound care consulted. Pt hyperkalemic, underwent HD on 1/30 and 1/31. Given dose of lokelma. Vascular performed angiogram on 01/31 which showed stenosis, status post balloon angioplasty.    Interval History:  Potassium 5.7 this morning.  Patient denies any chest discomfort.  Underwent fistulogram today, found to have stenosis status post angioplasty.  will go for dialysis today.    Review of Systems   Constitutional:  Negative for chills and diaphoresis.   Cardiovascular:  Negative for chest pain and palpitations.   Gastrointestinal:  Negative for abdominal distention and abdominal pain.     Objective:     Vital Signs (Most Recent):  Temp: 97.8 °F (36.6 °C) (01/31/24 0930)  Pulse: 70 (01/31/24 0930)  Resp: 18 (01/31/24 0930)  BP: 133/68 (01/31/24 0930)  SpO2: (!) 94 % (01/31/24 0930) Vital Signs (24h Range):  Temp:  [97.6 °F (36.4 °C)-98.2 °F (36.8 °C)] 97.8 °F (36.6 °C)  Pulse:  [68-83] 70  Resp:  [11-20] 18  SpO2:  [94 %-99 %] 94 %  BP: ()/(44-76) 133/68     Weight: 79 kg (174 lb 2.6 oz)  Body mass index is 27.28 kg/m².    Intake/Output Summary (Last 24 hours) at 1/31/2024 1020  Last data filed at 1/31/2024 0934  Gross per 24 hour   Intake 1800 ml   Output 1500 ml   Net 300 ml         Physical Exam  Vitals reviewed.   Pulmonary:      Effort: No respiratory distress.      Breath sounds: No wheezing.   Abdominal:      General: There is no distension.      Tenderness: There is no abdominal tenderness.   Skin:     Comments: Left ulcer covered in bandage   Neurological:      General: No focal deficit present.      Mental Status: She is alert and oriented to person, place, and time.             Significant Labs: All pertinent labs within the past 24 hours have  been reviewed.  CBC:   Recent Labs   Lab 01/29/24  1454 01/30/24  0610 01/31/24  0531   WBC 7.98 7.47 7.55   HGB 11.2* 10.2* 10.0*   HCT 33.5* 31.6* 32.1*    222 229     CMP:   Recent Labs   Lab 01/29/24  1454 01/30/24  0610 01/31/24  0531    138 137   K 4.4 5.3* 5.7*   CL 98 101 105   CO2 25 26 24   * 124* 108   BUN 57* 68* 57*   CREATININE 4.6* 6.2* 6.0*   CALCIUM 9.5 9.4 8.4*   PROT 7.0  --   --    ALBUMIN 3.8  --   --    BILITOT 0.3  --   --    ALKPHOS 96  --   --    AST 19  --   --    ALT 22  --   --    ANIONGAP 13 11 8       Significant Imaging: I have reviewed all pertinent imaging results/findings within the past 24 hours.    Assessment/Plan:      * Syncope  Stories are inconsistent, patient reports being alert but incoherent during episode.  Denies syncope although initial reports report loss of consciousness.    Orthostatic vitals unremarkable  CT head: No acute intracranial abnormalities. Chronic findings as discussed above.     ECHO done 1/22/2024    Left Ventricle: The left ventricle is normal in size. Mildly increased wall thickness. There is mild concentric hypertrophy. Normal wall motion. There is normal systolic function with a visually estimated ejection fraction of 60 - 65%. There is normal diastolic function.    Right Ventricle: Normal right ventricular cavity size. Wall thickness is normal. Right ventricle wall motion  is normal. Systolic function is normal.    Aortic Valve: There is mild aortic valve sclerosis.    Mitral Valve: There is mild regurgitation with a centrally directed jet.    IVC/SVC: Normal venous pressure at 3 mmHg.    Pericardium: There is a trivial posterior effusion. No indication of cardiac tamponade.    Fall precautions in place  Seizure precautions in place    Carotid ultrasounds showed no significant stenosis, presyncope likely secondary to hypotension during dialysis.  Patient may need to hold blood pressure medications on dialysis day.  Symptoms  improving.  Possible discharge home soon if patient tolerates dialysis.    Ulcer of left foot with fat layer exposed  Status post debridement 01/30/2024      Diabetic ulcer of left midfoot associated with type 2 diabetes mellitus  Patient has chronic left plantar medial foot and left medial 3rd toe diabetic foot ulcers  Patient follows outpatient with Dr. Bruno for wound care   Tight glucose control.  Follow up outpatient once discharged.  Wound care consulted , status post debridement on 01/30/2024. Per podiatry: Recommend dressing changes to consist of santyl followed by aquacel Ag followed by border foam       Type 2 diabetes mellitus  Patient started on diabetic diet   Last A1c (6/6/2023) 8.3  Hold Oral hypoglycemics while patient is in the hospital.  Sliding scale insulin ordered  Continue to monitor blood glucose with POC glucose checks     ESRD (end stage renal disease)  Patient has ESRD and gets HD Monday/Wednesday/Friday   Patient completed 2.5 hours out of 3 hours of dialysis today     Patient on renal diet  Continue phospate binders    Nephrology consulted , one dose of lokelma today  Underwent fistulogram today, status post balloon angioplasty    Chronic diastolic heart failure  Patient has chronic diastolic heart failure   Last ECHO (1/22/2024)    Left Ventricle: The left ventricle is normal in size. Mildly increased wall thickness. There is mild concentric hypertrophy. Normal wall motion. There is normal systolic function with a visually estimated ejection fraction of 60 - 65%. There is normal diastolic function.    Right Ventricle: Normal right ventricular cavity size. Wall thickness is normal. Right ventricle wall motion  is normal. Systolic function is normal.    Aortic Valve: There is mild aortic valve sclerosis.    Mitral Valve: There is mild regurgitation with a centrally directed jet.    IVC/SVC: Normal venous pressure at 3 mmHg.    Pericardium: There is a trivial posterior effusion. No  indication of cardiac tamponade.    Monitor intake and output  Monitor daily weights           Hypertension associated with diabetes  Continue home regimen as tolerated   Continue to monitor vitals       Hyperlipidemia associated with type 2 diabetes mellitus  Continue home regimen         VTE Risk Mitigation (From admission, onward)           Ordered     heparin (porcine) injection 4,000 Units  As needed (PRN)         01/30/24 1505     heparin (porcine) injection 5,000 Units  Every 8 hours         01/29/24 1657     IP VTE HIGH RISK PATIENT  Once         01/29/24 1657     Place sequential compression device  Until discontinued         01/29/24 1657                    Discharge Planning   ERI: 2/1/2024     Code Status: Full Code   Is the patient medically ready for discharge?:     Reason for patient still in hospital (select all that apply): Patient trending condition  Discharge Plan A: Home with family                  Shannan Varela MD  Department of Hospital Medicine   [facility]

## 2024-01-31 NOTE — NURSING
Nurses Note -- 4 Eyes      1/30/2024   9:28 PM      Skin assessed during: Admit      [x] No Altered Skin Integrity Present    [x]Prevention Measures Documented      [] Yes- Altered Skin Integrity Present or Discovered   [] LDA Added if Not in Epic (Describe Wound)   [] New Altered Skin Integrity was Present on Admit and Documented in LDA   [] Wound Image Taken    Wound Care Consulted? No    Attending Nurse:  Franci Bailey RN/Staff Member:   [id number]

## 2024-01-31 NOTE — CONSULTS
[facility]  Vascular Surgery  Consult Note      Subjective:     Chief Complaint/Reason for Admission: stenosis avf    History of Present Illness:  Patient is a 58-year-old female admitted with syncope during dialysis.  She also had an episode of right-sided chest pain with nausea vomiting last night.  She was to have an outpatient fistulogram on February 1st due to poor flow rates in her AV fistula.  She has no other complaints.    She has a history of peripheral vascular disease, angioplasty by myself last year.  She had debridement of her left foot plantar wound by Podiatry yesterday.    Medications Prior to Admission   Medication Sig Dispense Refill Last Dose    amLODIPine (NORVASC) 5 MG tablet Take 1 tablet by mouth once daily.   1/28/2024 at 21:00    aspirin (ECOTRIN) 81 MG EC tablet Take 1 tablet (81 mg total) by mouth once daily.  0 1/29/2024 at 09:00    atorvastatin (LIPITOR) 80 MG tablet Take 1 tablet (80 mg total) by mouth once daily. 90 tablet 3 1/28/2024 at 21:00    cinacalcet (SENSIPAR) 60 MG Tab Take 2 tablets by mouth 2 (two) times daily with meals.   1/29/2024 at 09:00    clopidogreL (PLAVIX) 75 mg tablet Take 1 tablet (75 mg total) by mouth once daily. 90 tablet 3 1/29/2024 at 09:00    doxycycline (VIBRAMYCIN) 100 MG Cap Take 100 mg by mouth 2 (two) times daily.   1/29/2024 at 09:00    gabapentin (NEURONTIN) 100 MG capsule Take 1 capsule (100 mg total) by mouth 2 (two) times daily. 180 capsule 3 1/29/2024 at 09:00    minoxidiL (LONITEN) 2.5 MG tablet Take 2 tablets by mouth 2 (two) times daily.   1/29/2024 at 09:00    multivitamin (THERAGRAN) per tablet Take 1 tablet by mouth once daily.   1/29/2024    OZEMPIC 0.25 mg or 0.5 mg (2 mg/3 mL) pen injector INJECT 0.25 MG SUBCUTANEOUSLY ONCE A WEEK (Patient taking differently: Inject 0.25 mg into the skin every 7 days. INJECT 0.25 MG SUBCUTANEOUSLY ONCE A WEEK) 1.5 mL 2 1/22/2024    pantoprazole (PROTONIX) 40 MG tablet Take 1 tablet (40 mg  "total) by mouth once daily. 30 tablet 2 1/29/2024 at 09:00    sucroferric oxyhydroxide (VELPHORO) 500 mg Chew Take 2 tablets by mouth 3 (three) times daily.   1/29/2024 at 09:00    acetaminophen (TYLENOL) 325 MG tablet Take 325 mg by mouth every 6 (six) hours.   Unknown    albuterol (PROVENTIL/VENTOLIN HFA) 90 mcg/actuation inhaler Inhale 2 puffs into the lungs every 4 to 6 hours as needed for Shortness of Breath or Wheezing.   Unknown    ALPRAZolam (XANAX) 0.5 MG tablet Take 1 tablet by mouth.       blood sugar diagnostic Strp To check BG 4 times daily, to use with insurance preferred meter 450 strip 3     blood-glucose meter kit To check BG 3 times daily, to use with insurance preferred meter 1 each 0     cetirizine (ZYRTEC) 10 MG tablet Take 1 tablet (10 mg total) by mouth every other day. 45 tablet 3 Unknown    epoetin chris-epbx (RETACRIT) 4,000 unit/mL injection Inject 1.11 mLs (4,440 Units total) into the skin every Mon, Wed, Fri.   1/26/2024    hydrALAZINE (APRESOLINE) 25 MG tablet Take 1 tablet (25 mg total) by mouth 2 (two) times daily as needed (Systolic blood pressure > 170 mm Hg). (Patient not taking: Reported on 1/25/2024)       lancets Misc To check BG 1 times daily, to use with insurance preferred meter 100 each 11     lancets Misc To check BG 3 times daily, to use with insurance preferred meter 100 each 1     nitroGLYCERIN (NITROSTAT) 0.4 MG SL tablet Place 1 tablet (0.4 mg total) under the tongue every 5 (five) minutes as needed for Chest pain. (Patient not taking: Reported on 1/25/2024) 30 tablet 0     pen needle, diabetic (BD ULTRA-FINE ROBERT PEN NEEDLE) 32 gauge x 5/32" Ndle 1 pen by Misc.(Non-Drug; Combo Route) route 4 (four) times daily. To use 4 times per day with insulin injections. 100 each 1        Review of patient's allergies indicates:   Allergen Reactions    Dilaudid [hydromorphone] Nausea And Vomiting    Chlorhexidine Itching    Lortab [hydrocodone-acetaminophen] Itching       Past " Medical History:   Diagnosis Date    A-fib     resolved per patient    Anemia due to end stage renal disease 6/30/2022    Arthritis     Bronchitis     Cardiovascular event risk, ASCVD 10-year risk 6.7% 10/15/2016    Diabetes mellitus type II     Diabetic foot infection     Diabetic ulcer of left midfoot 05/05/2022    Disorder of kidney and ureter     Encounter for blood transfusion     ESRD (end stage renal disease) 05/18/2018    MANJINDER (generalized anxiety disorder) 10/25/2016    History of colon polyps 11/02/2016    Hyperlipidemia     Hypertension     Stroke      Past Surgical History:   Procedure Laterality Date    ANGIOGRAPHY OF LOWER EXTREMITY Left 10/18/2022    Procedure: Angiogram Extremity Unilateral;  Surgeon: Ali Khoobehi, MD;  Location: [facility] CATH/EP LAB;  Service: Vascular;  Laterality: Left;    AV FISTULA PLACEMENT Left 8/29/2022    Procedure: CREATION, AV FISTULA;  Surgeon: Ali Khoobehi, MD;  Location: [facility] OR;  Service: Vascular;  Laterality: Left;    BONE BIOPSY Left 8/24/2022    Procedure: Biopsy-Bone;  Surgeon: Porfirio Ponce DPM;  Location: [facility] OR;  Service: Podiatry;  Laterality: Left;    COLONOSCOPY N/A 10/5/2016    Procedure: COLONOSCOPY;  Surgeon: Pillo Chanel MD;  Location: [facility] ENDO;  Service: Endoscopy;  Laterality: N/A;    COLONOSCOPY N/A 4/26/2022    Procedure: COLONOSCOPY;  Surgeon: Saravanan Rachel MD;  Location: [facility] ENDO;  Service: Endoscopy;  Laterality: N/A;    FISTULOGRAM Left 8/24/2022    Procedure: Fistulogram;  Surgeon: Ali Khoobehi, MD;  Location: [facility] CATH/EP LAB;  Service: Vascular;  Laterality: Left;    HERNIA REPAIR Bilateral 11/22/2016    inguinal    HYSTERECTOMY      INCISION AND DRAINAGE FOOT Left 5/13/2022    Procedure: INCISION AND DRAINAGE, FOOT;  Surgeon: Ricardo Bruno DPM;  Location: [facility] OR;  Service: Podiatry;  Laterality: Left;    OOPHORECTOMY      THROMBECTOMY, AV FISTULA, UPPER EXTREMITY, PERCUTANEOUS  8/24/2022    Procedure: THROMBECTOMY, AV FISTULA,  UPPER EXTREMITY, PERCUTANEOUS;  Surgeon: Ali Khoobehi, MD;  Location: [facility] CATH/EP LAB;  Service: Vascular;;    TOE AMPUTATION Left 8/26/2022    Procedure: AMPUTATION, TOE;  Surgeon: Porfirio Ponce DPM;  Location: [facility] OR;  Service: Podiatry;  Laterality: Left;     Family History       Problem Relation (Age of Onset)    Cancer Paternal Grandmother    Diabetes Father, Sister, Sister, Maternal Aunt, Maternal Grandmother    Heart disease Maternal Grandmother    Hyperlipidemia Mother    Hypertension Mother, Father          Tobacco Use    Smoking status: Never    Smokeless tobacco: Never   Substance and Sexual Activity    Alcohol use: No    Drug use: No    Sexual activity: Yes     Partners: Male     Review of Systems  Objective:     Vital Signs (Most Recent):  Temp: 97.6 °F (36.4 °C) (01/31/24 0446)  Pulse: 71 (01/31/24 0446)  Resp: 18 (01/31/24 0446)  BP: (!) 97/57 (01/31/24 0446)  SpO2: 97 % (01/31/24 0446) Vital Signs (24h Range):  Temp:  [97.6 °F (36.4 °C)-98.2 °F (36.8 °C)] 97.6 °F (36.4 °C)  Pulse:  [68-83] 71  Resp:  [16-18] 18  SpO2:  [97 %-99 %] 97 %  BP: ()/(44-76) 97/57     Weight: 79 kg (174 lb 2.6 oz)  Body mass index is 27.28 kg/m².        Physical Exam  Vitals and nursing note reviewed.   Constitutional:       Appearance: Normal appearance.   Neck:      Vascular: No carotid bruit.   Cardiovascular:      Rate and Rhythm: Normal rate and regular rhythm.      Pulses:           Radial pulses are 2+ on the right side and 2+ on the left side.        Femoral pulses are 2+ on the right side and 2+ on the left side.       Dorsalis pedis pulses are detected w/ Doppler on the right side and detected w/ Doppler on the left side.      Heart sounds: Normal heart sounds.      Comments: Left plantar wound dressed    LUE AVF +thrill/bruit  Pulmonary:      Effort: Pulmonary effort is normal.      Breath sounds: Normal breath sounds.   Abdominal:      Palpations: Abdomen is soft.      Tenderness: There is no  abdominal tenderness.   Skin:     Capillary Refill: Capillary refill takes less than 2 seconds.   Neurological:      Mental Status: She is alert.         Significant Labs:  CBC:   Recent Labs   Lab 01/31/24  0531   WBC 7.55   RBC 3.57*   HGB 10.0*   HCT 32.1*      MCV 90   MCH 28.0   MCHC 31.2*     CMP:   Recent Labs   Lab 01/29/24  1454 01/30/24  0610 01/31/24  0531   *   < > 108   CALCIUM 9.5   < > 8.4*   ALBUMIN 3.8  --   --    PROT 7.0  --   --       < > 137   K 4.4   < > 5.7*   CO2 25   < > 24   CL 98   < > 105   BUN 57*   < > 57*   CREATININE 4.6*   < > 6.0*   ALKPHOS 96  --   --    ALT 22  --   --    AST 19  --   --    BILITOT 0.3  --   --     < > = values in this interval not displayed.     Coagulation:   Recent Labs   Lab 01/29/24  1454   LABPROT 10.2   INR 0.9       Significant Diagnostics:  I have reviewed all pertinent imaging results/findings within the past 24 hours.    Assessment/Plan:   Patient with chest pain symptoms, which is unrelated to her catheter.  I would like to however perform a fistulogram for her so that we can expedite removal of the tunneled catheter.  Patient is agreeable for this.    Podiatry consult appreciated.  We will follow up with her left leg vascular disease as an outpatient.      Active Diagnoses:    Diagnosis Date Noted POA    PRINCIPAL PROBLEM:  Syncope [R55] 11/02/2022 Yes    Ulcer of left foot with fat layer exposed [L97.522] 01/30/2024 Yes    Diabetic ulcer of left midfoot associated with type 2 diabetes mellitus [E11.621, L97.429]  Yes    Type 2 diabetes mellitus [E11.9] 06/03/2021 Yes    ESRD (end stage renal disease) [N18.6] 05/18/2018 Yes    Chronic diastolic heart failure [I50.32] 10/15/2016 Yes    Hypertension associated with diabetes [E11.59, I15.2] 06/13/2013 Yes     Chronic    Hyperlipidemia associated with type 2 diabetes mellitus [E11.69, E78.5] 06/22/2012 Yes      Problems Resolved During this Admission:       Thank you for your consult.  I will follow-up with patient. Please contact us if you have any additional questions.    Ali Khoobehi, MD  Vascular Surgery  [facility]

## 2024-01-31 NOTE — ASSESSMENT & PLAN NOTE
Stories are inconsistent, patient reports being alert but incoherent during episode.  Denies syncope although initial reports report loss of consciousness.    Orthostatic vitals unremarkable  CT head: No acute intracranial abnormalities. Chronic findings as discussed above.     ECHO done 1/22/2024    Left Ventricle: The left ventricle is normal in size. Mildly increased wall thickness. There is mild concentric hypertrophy. Normal wall motion. There is normal systolic function with a visually estimated ejection fraction of 60 - 65%. There is normal diastolic function.    Right Ventricle: Normal right ventricular cavity size. Wall thickness is normal. Right ventricle wall motion  is normal. Systolic function is normal.    Aortic Valve: There is mild aortic valve sclerosis.    Mitral Valve: There is mild regurgitation with a centrally directed jet.    IVC/SVC: Normal venous pressure at 3 mmHg.    Pericardium: There is a trivial posterior effusion. No indication of cardiac tamponade.    Fall precautions in place  Seizure precautions in place    Carotid ultrasounds showed no significant stenosis, presyncope likely secondary to hypotension during dialysis.  Patient may need to hold blood pressure medications on dialysis day.  Symptoms improving.  Possible discharge home soon if patient tolerates dialysis.

## 2024-01-31 NOTE — NURSING
PT transferred to room 1217 via stretcher on telebox.  PT states comfort and site WNL.  Nikki ALEX at bedside.

## 2024-01-31 NOTE — TELEPHONE ENCOUNTER
----- Message from Jaquan Quevedo sent at 1/31/2024 11:03 AM CST -----  Contact: Self  Type:  Sooner Appointment Request    Caller is requesting a sooner appointment.  Caller declined first available appointment listed below.  Caller will not accept being placed on the waitlist and is requesting a message be sent to doctor.    Name of Caller:  Spouse/Donovan  When is the first available appointment?  4/25  Symptoms:  HFU [facility] 2/1  Would the patient rather a call back or a response via [facility]? Call  Best Call Back Number:  [phone]  Additional Information:

## 2024-01-31 NOTE — PROGRESS NOTES
[facility]  Podiatry  Progress Note    Patient Name: Leanna García  MRN: [id number]  Admission Date: 1/29/2024  Hospital Length of Stay: 1 days  Attending Physician: Shannan Varela MD  Primary Care Provider: Stefano Lopez MD     Subjective:     Interval History:   Patient at dialysis.  Patient underwent fistulogram today, found to have stenosis status post angioplasty.   Discussed with nurse dressing change.  Placed an order for Santyl followed by Aquacel Ag followed by border foam daily dressing changes.    Scheduled Meds:   amLODIPine  5 mg Oral Daily    aspirin  81 mg Oral Daily    atorvastatin  80 mg Oral QHS    cinacalcet  120 mg Oral BID WM    clopidogreL  75 mg Oral Daily    collagenase   Topical (Top) Daily    doxycycline  100 mg Oral BID    gabapentin  100 mg Oral BID    heparin (porcine)  5,000 Units Subcutaneous Q8H    minoxidiL  5 mg Oral BID    mupirocin   Nasal BID    pantoprazole  40 mg Oral Daily    sucroferric oxyhydroxide  2 tablet Oral TID     Continuous Infusions:  PRN Meds:acetaminophen, acetaminophen, dextrose 50%, dextrose 50%, glucagon (human recombinant), glucose, glucose, heparin (porcine), insulin aspart U-100, labetalol, melatonin, ondansetron, promethazine, sodium chloride 0.9%    Review of Systems  Objective:     Vital Signs (Most Recent):  Temp: 98.6 °F (37 °C) (01/31/24 1216)  Pulse: 73 (01/31/24 1216)  Resp: 18 (01/31/24 1216)  BP: 123/74 (01/31/24 1216)  SpO2: 98 % (01/31/24 1216) Vital Signs (24h Range):  Temp:  [97.6 °F (36.4 °C)-98.6 °F (37 °C)] 98.6 °F (37 °C)  Pulse:  [68-83] 73  Resp:  [11-20] 18  SpO2:  [94 %-99 %] 98 %  BP: ()/(44-74) 123/74     Weight: 79 kg (174 lb 2.6 oz)  Body mass index is 27.28 kg/m².    Foot Exam              Laboratory:  All pertinent labs reviewed within the last 24 hours.    Diagnostic Results:  I have reviewed all pertinent imaging results/findings within the past 24 hours.  Assessment/Plan:     Orthopedic  Ulcer of  left foot with fat layer exposed  Recommend dressing changes to consist of santyl followed by aquacel Ag followed by border foam    Wilman and glucerna daily    Tight glucose control     Counseled patient on increasing protein intake, not getting wound wet, keeping dressing clean dry and intact, following a healthy diet, elevating legs when able, removing pressure from wound    Patient can follow up outpatient once discharged        Alivia Bruno DPM  Podiatry  [facility]

## 2024-01-31 NOTE — ASSESSMENT & PLAN NOTE
Recommend dressing changes to consist of santyl followed by aquacel Ag followed by border foam    Wilman and glucerna daily    Tight glucose control     Counseled patient on increasing protein intake, not getting wound wet, keeping dressing clean dry and intact, following a healthy diet, elevating legs when able, removing pressure from wound    Patient can follow up outpatient once discharged

## 2024-01-31 NOTE — NURSING
Pt requested to have  at bedside upon discharge to go over all discharge information/instructions. Charge nurse aware.  Pt instructed to notify staff when  is at bedside.

## 2024-01-31 NOTE — OP NOTE
[facility]  Vascular Surgery  Operative Note    SUMMARY     Date of Procedure: 1/31/2024     Procedure: Procedure(s) (LRB):  Fistulogram (Left)  PTA, AV FISTULA (Left)     Surgeon(s) and Role:     * Khoobehi, Ali, MD - Primary    Assisting Surgeon: None    Pre-Operative Diagnosis: ESRD (end stage renal disease) [N18.6]    Post-Operative Diagnosis: Post-Op Diagnosis Codes:     * ESRD (end stage renal disease) [N18.6]    Anesthesia: RN IV Sedation    Operative Findings (including complications, if any): severe stenosis avf inflow    Description of Technical Procedures:   Indications, risks, benefits of left arm fistulogram with possible angioplasty were discussed with the patient. Risks discussed included possible bleeding, access failure, infection, cardiac arrest, need for future interventions, and death. Patient was agreeable for the procedure and signed consent.    Under my direct supervision, moderate sedation was administered during the course of the procedure with Versed (4 mg) and Fentanyl (150 mcgs). An independent observer was present throughout the procedure, there was continuous monitoring of cardiac telemetry, hemodynamics and pulse oximetry. I was personally present and spent 30 minutes face to face time during sedation administration.    6F sheath was placed in an antegrade fashion. Fistulogram was performed which demonstrated moderate stenosis proximally of approximately 60%, distal to prior stent. Angioplasty was performed with a 7 millimeter balloon. There is resolution of the stenosis, however there is an area of severe 80% stenosis at the inflow vein. No central stenosis was noted.    Sheath was reversed.  Angioplasty of the inflow cephalic vein was performed with a 6 mm balloon, with resolution of the stenosis.    Sheath was removed and puncture site repaired with 4-0 Vicryl suture. Patient was brought out of the procedure room in stable condition.    Significant Surgical Tasks  Conducted by the Assistant(s), if Applicable: n/a    Estimated Blood Loss (EBL): * No values recorded between 1/31/2024  7:54 AM and 1/31/2024  8:34 AM *           Implants: * No implants in log *    Specimens:   Specimen (24h ago, onward)      None                    Condition: Good    Disposition: PACU - hemodynamically stable.    Attestation: I performed the procedure.

## 2024-01-31 NOTE — SUBJECTIVE & OBJECTIVE
Interval History:  Potassium 5.7 this morning.  Patient denies any chest discomfort.  Underwent fistulogram today, found to have stenosis status post angioplasty.  will go for dialysis today.    Review of Systems   Constitutional:  Negative for chills and diaphoresis.   Cardiovascular:  Negative for chest pain and palpitations.   Gastrointestinal:  Negative for abdominal distention and abdominal pain.     Objective:     Vital Signs (Most Recent):  Temp: 97.8 °F (36.6 °C) (01/31/24 0930)  Pulse: 70 (01/31/24 0930)  Resp: 18 (01/31/24 0930)  BP: 133/68 (01/31/24 0930)  SpO2: (!) 94 % (01/31/24 0930) Vital Signs (24h Range):  Temp:  [97.6 °F (36.4 °C)-98.2 °F (36.8 °C)] 97.8 °F (36.6 °C)  Pulse:  [68-83] 70  Resp:  [11-20] 18  SpO2:  [94 %-99 %] 94 %  BP: ()/(44-76) 133/68     Weight: 79 kg (174 lb 2.6 oz)  Body mass index is 27.28 kg/m².    Intake/Output Summary (Last 24 hours) at 1/31/2024 1020  Last data filed at 1/31/2024 0934  Gross per 24 hour   Intake 1800 ml   Output 1500 ml   Net 300 ml         Physical Exam  Vitals reviewed.   Pulmonary:      Effort: No respiratory distress.      Breath sounds: No wheezing.   Abdominal:      General: There is no distension.      Tenderness: There is no abdominal tenderness.   Skin:     Comments: Left ulcer covered in bandage   Neurological:      General: No focal deficit present.      Mental Status: She is alert and oriented to person, place, and time.             Significant Labs: All pertinent labs within the past 24 hours have been reviewed.  CBC:   Recent Labs   Lab 01/29/24  1454 01/30/24  0610 01/31/24  0531   WBC 7.98 7.47 7.55   HGB 11.2* 10.2* 10.0*   HCT 33.5* 31.6* 32.1*    222 229     CMP:   Recent Labs   Lab 01/29/24  1454 01/30/24  0610 01/31/24  0531    138 137   K 4.4 5.3* 5.7*   CL 98 101 105   CO2 25 26 24   * 124* 108   BUN 57* 68* 57*   CREATININE 4.6* 6.2* 6.0*   CALCIUM 9.5 9.4 8.4*   PROT 7.0  --   --    ALBUMIN 3.8  --   --     BILITOT 0.3  --   --    ALKPHOS 96  --   --    AST 19  --   --    ALT 22  --   --    ANIONGAP 13 11 8       Significant Imaging: I have reviewed all pertinent imaging results/findings within the past 24 hours.

## 2024-01-31 NOTE — SUBJECTIVE & OBJECTIVE
Subjective:     Interval History:   Patient at dialysis.  Patient underwent fistulogram today, found to have stenosis status post angioplasty.   Discussed with nurse dressing change.  [facility] daily dressing changes.    Scheduled Meds:   amLODIPine  5 mg Oral Daily    aspirin  81 mg Oral Daily    atorvastatin  80 mg Oral QHS    cinacalcet  120 mg Oral BID WM    clopidogreL  75 mg Oral Daily    collagenase   Topical (Top) Daily    doxycycline  100 mg Oral BID    gabapentin  100 mg Oral BID    heparin (porcine)  5,000 Units Subcutaneous Q8H    minoxidiL  5 mg Oral BID    mupirocin   Nasal BID    pantoprazole  40 mg Oral Daily    sucroferric oxyhydroxide  2 tablet Oral TID     Continuous Infusions:  PRN Meds:acetaminophen, acetaminophen, dextrose 50%, dextrose 50%, glucagon (human recombinant), glucose, glucose, heparin (porcine), insulin aspart U-100, labetalol, melatonin, ondansetron, promethazine, sodium chloride 0.9%    Review of Systems  Objective:     Vital Signs (Most Recent):  Temp: 98.6 °F (37 °C) (01/31/24 1216)  Pulse: 73 (01/31/24 1216)  Resp: 18 (01/31/24 1216)  BP: 123/74 (01/31/24 1216)  SpO2: 98 % (01/31/24 1216) Vital Signs (24h Range):  Temp:  [97.6 °F (36.4 °C)-98.6 °F (37 °C)] 98.6 °F (37 °C)  Pulse:  [68-83] 73  Resp:  [11-20] 18  SpO2:  [94 %-99 %] 98 %  BP: ()/(44-74) 123/74     Weight: 79 kg (174 lb 2.6 oz)  Body mass index is 27.28 kg/m².    Foot Exam              Laboratory:  All pertinent labs reviewed within the last 24 hours.    Diagnostic Results:  I have reviewed all pertinent imaging results/findings within the past 24 hours.

## 2024-01-31 NOTE — ASSESSMENT & PLAN NOTE
Patient has chronic diastolic heart failure   Last ECHO (1/22/2024)    Left Ventricle: The left ventricle is normal in size. Mildly increased wall thickness. There is mild concentric hypertrophy. Normal wall motion. There is normal systolic function with a visually estimated ejection fraction of 60 - 65%. There is normal diastolic function.    Right Ventricle: Normal right ventricular cavity size. Wall thickness is normal. Right ventricle wall motion  is normal. Systolic function is normal.    Aortic Valve: There is mild aortic valve sclerosis.    Mitral Valve: There is mild regurgitation with a centrally directed jet.    IVC/SVC: Normal venous pressure at 3 mmHg.    Pericardium: There is a trivial posterior effusion. No indication of cardiac tamponade.    Monitor intake and output  Monitor daily weights

## 2024-01-31 NOTE — NURSING
Received report from dialysis nurse, 1L of fluid removed pt tolerated well, VSS. Pt transported back to unit via bed by staff members x 2. NADN. POC resumed

## 2024-01-31 NOTE — PROGRESS NOTES
HD completed  UF 1 liter     01/31/24 1615   Vital Signs   Temp 98 °F (36.7 °C)   Pulse 75   Resp 18   SpO2 97 %   BP (!) 129/50   During Hemodialysis Assessment   Blood Flow Rate (mL/min) 300 mL/min   Dialysate Flow Rate (mL/min) 600 ml/min   Ultrafiltration Rate (mL/Hr) 0 mL/Hr   Arteriovenous Lines Secure Yes   Arterial Pressure (mmHg) -180 mmHg   Venous Pressure (mmHg) 150   UF Removed (mL) 1209 mL   TMP 10   Venous Line in Air Detector Yes   Intake (mL) 250 mL   Transducer Dry Yes   Access Visible Yes    notified of access issue? N/A   Heparin given? N/A   Intra-Hemodialysis Comments hd completed   Post-Hemodialysis Assessment   Rinseback Volume (mL) 250 mL   Blood Volume Processed (Liters) 50.5 L   Dialyzer Clearance Clear   Duration of Treatment 180 minutes   Additional Fluid Intake (mL) 500 mL   Total UF (mL) 1209 mL   Net Fluid Removal 709   Patient Response to Treatment debbi well   Post-Treatment Weight 78 kg (171 lb 15.3 oz)   Treatment Weight Change -1   Arterial bleeding stop time (min) 5 min   Venous bleeding stop time (min) 5 min   Post-Hemodialysis Comments hd completed

## 2024-02-01 NOTE — DISCHARGE SUMMARY
[facility]  Discharge Summary      Patient Name: Leanna García  MRN: [id number]  PRIYANKA: [id number]  Patient Class: IP- Inpatient  Admission Date: 1/29/2024  Hospital Length of Stay: 1 days  Discharge Date and Time:  01/31/2024 6:36 PM  Attending Physician: Shannan Varela MD   Discharging Provider: Shannan Varela MD  Primary Care Provider: Stefano Lopez MD    Primary Care Team: Networked reference to record PCT     HPI:   59 y/o female with history of ESRD on HD Monday/Wednesday/Friday, DM, HTN, atrial fibrillation, MANJINDER, HLD, and chronic diastolic HF presents to the ED for a syncopal episode while at dialysis today. Patient states that she woke up this morning feeling completely normal and went to HD. Patient states that after 2.5 hours of HD she lost consciousness for a short time and was told that her LUE was shaking. Patient denies any prodrome and states that she does not remember the episode. When patient regained consciousness she states that she just felt fatigued like she typically does after dialysis. Patient was recently in the hospital for a cardiac workup after having chest pain and has established care with Dr. STEVE dAame for cardiology. Patient denies any chest pain, SOB, abdominal pain, nausea, vomiting, diarrhea, focal weakness, or dizziness. Code status was discussed and patient was placed as a full code at this time.     ED: Vitals showed HR 83, /74, RR 20 saturating at 98% on RA. Labs were significant for glucose 207, troponin 26, , and magnesium 2.1. CT head showed no acute changes.     Procedure(s) (LRB):  Fistulogram (Left)  PTA, AV FISTULA (Left)      Hospital Course:   Patient with history of ESRD on HD MWF, diabetes, hypertension, atrial fibrillation, chronic diastolic heart failure presents to the emergency room for questionable syncope.  Patient states that she became incoherent during dialysis after 2-1/2 hours, denies loss of consciousness  although initial reports say that she did lose consciousness.  Head CT was negative.  Carotid ultrasound was negative for significant stenosis.  Echocardiogram on 01/23/2024 showed normal wall motion, ejection fraction 60-65%.  Troponin elevated likely 2/2 to demand from hypotension and pt with ESRD.  Nuclear stress test on 01/24/2024 showed no convincing evidence of pharmacologically induced reversible ischemia or infarct.. Nephro and wound care consulted. Pt hyperkalemic, underwent HD on 1/30 and 1/31. Given dose of lokelma. Vascular performed angiogram on 01/31 which showed stenosis, status post balloon angioplasty.  Patient instructed to follow up with vascular surgery and Podiatry outpatient.  Discussed with Nephrology, patient cleared for discharge.  Instructed patient to hold blood pressure medication morning of dialysis.  Patient medically stable for discharge.  Patient also instructed to follow up with Cardiology outpatient.      Physical Exam  Vitals reviewed.   Pulmonary:      Effort: No respiratory distress.      Breath sounds: No wheezing.   Abdominal:      General: There is no distension.      Tenderness: There is no abdominal tenderness.   Skin:     Comments: Left ulcer covered in bandage   Neurological:      General: No focal deficit present.      Mental Status: She is alert and oriented to person, place, and time.         Goals of Care Treatment Preferences:  Code Status: Full Code      Consults:   Consults (From admission, onward)          Status Ordering Provider     Inpatient consult to Podiatry  Once        Provider:  Alivia Bruno DPM    Acknowledged SHANNAN MINOR     Inpatient consult to Podiatry  Once        Provider:  Alivia Bruno DPM    Acknowledged LEONORA MAYO     Inpatient consult to Nephrology  Once        Provider:  Bryce Craig MD    Acknowledged LEONORA MAYO     Inpatient consult to Hospitalist  Once        Provider:  Shannan Minor MD    Acknowledged LEONORA MAYO             No new Assessment & Plan notes have been filed under this hospital service since the last note was generated.  Service: Hospital Medicine    Final Active Diagnoses:    Diagnosis Date Noted POA    Diabetic ulcer of left midfoot associated with type 2 diabetes mellitus [E11.621, L97.429]  Yes    Type 2 diabetes mellitus [E11.9] 06/03/2021 Yes    ESRD (end stage renal disease) [N18.6] 05/18/2018 Yes    Chronic diastolic heart failure [I50.32] 10/15/2016 Yes    Hypertension associated with diabetes [E11.59, I15.2] 06/13/2013 Yes     Chronic    Hyperlipidemia associated with type 2 diabetes mellitus [E11.69, E78.5] 06/22/2012 Yes      Problems Resolved During this Admission:    Diagnosis Date Noted Date Resolved POA    PRINCIPAL PROBLEM:  Syncope [R55] 11/02/2022 01/31/2024 Yes    Ulcer of left foot with fat layer exposed [L97.522] 01/30/2024 01/31/2024 Yes       Discharged Condition: good    Disposition: Home or Self Care    Follow Up:   Follow-up Information       [facility]. Go on 2/5/2024.    Specialty: Primary Care  Why: Hospital follow up at 1:30 on 2/5  Contact information:  [address] [facility] [address]  [place] [place] [facility] [id number] [phone]  Additional information:  [address]             Stefano Lopez MD Follow up in 3 day(s).    Specialty: Family Medicine  Contact information:  [address] [facility] [address]  [phone]               Alivia Bruno DPM Follow up in 1 week(s).    Specialties: Podiatry, Surgery, Wound Care  Contact information:  [address] [facility]  [address]  [facility]  [facility] [facility] [address]  [phone]               Khoobehi, Ali, MD Follow up in 1 week(s).    Specialty: Vascular Surgery  Contact information:  [address] [facility]  [address]  [facility] [place] [facility] [address]  [phone]                           Patient Instructions:      Diet renal     Notify your health care provider if you experience any of the following:  temperature >100.4     Notify your  health care provider if you experience any of the following:  persistent nausea and vomiting or diarrhea     Notify your health care provider if you experience any of the following:  severe uncontrolled pain     Notify your health care provider if you experience any of the following:  redness, tenderness, or signs of infection (pain, swelling, redness, odor or green/yellow discharge around incision site)     Activity as tolerated       Significant Diagnostic Studies: Labs: CMP   Recent Labs   Lab 01/30/24  0610 01/31/24  0531    137   K 5.3* 5.7*    105   CO2 26 24   * 108   BUN 68* 57*   CREATININE 6.2* 6.0*   CALCIUM 9.4 8.4*   ANIONGAP 11 8    and CBC   Recent Labs   Lab 01/30/24  0610 01/31/24  0531   WBC 7.47 7.55   HGB 10.2* 10.0*   HCT 31.6* 32.1*    229       Pending Diagnostic Studies:       None           Medications:  Reconciled Home Medications:      Medication List        START taking these medications      collagenase ointment  Commonly known as: SANTYL  Apply topically once daily.  Start taking on: February 1, 2024            CHANGE how you take these medications      OZEMPIC 0.25 mg or 0.5 mg (2 mg/3 mL) pen injector  Generic drug: semaglutide  INJECT 0.25 MG SUBCUTANEOUSLY ONCE A WEEK  What changed:   how much to take  how to take this  when to take this            CONTINUE taking these medications      acetaminophen 325 MG tablet  Commonly known as: TYLENOL  Take 325 mg by mouth every 6 (six) hours.     albuterol 90 mcg/actuation inhaler  Commonly known as: PROVENTIL/VENTOLIN HFA  Inhale 2 puffs into the lungs every 4 to 6 hours as needed for Shortness of Breath or Wheezing.     ALPRAZolam 0.5 MG tablet  Commonly known as: XANAX  Take 1 tablet by mouth.     amLODIPine 5 MG tablet  Commonly known as: NORVASC  Take 1 tablet by mouth once daily.     aspirin 81 MG EC tablet  Commonly known as: ECOTRIN  Take 1 tablet (81 mg total) by mouth once daily.     atorvastatin 80 MG  "tablet  Commonly known as: LIPITOR  Take 1 tablet (80 mg total) by mouth once daily.     blood sugar diagnostic Strp  To check BG 4 times daily, to use with insurance preferred meter     blood-glucose meter kit  To check BG 3 times daily, to use with insurance preferred meter     cetirizine 10 MG tablet  Commonly known as: ZYRTEC  Take 1 tablet (10 mg total) by mouth every other day.     cinacalcet 60 MG Tab  Commonly known as: SENSIPAR  Take 2 tablets by mouth 2 (two) times daily with meals.     clopidogreL 75 mg tablet  Commonly known as: PLAVIX  Take 1 tablet (75 mg total) by mouth once daily.     doxycycline 100 MG Cap  Commonly known as: VIBRAMYCIN  Take 100 mg by mouth 2 (two) times daily.     epoetin chris-epbx 4,000 unit/mL injection  Commonly known as: RETACRIT  Inject 1.11 mLs (4,440 Units total) into the skin every Mon, Wed, Fri.     gabapentin 100 MG capsule  Commonly known as: NEURONTIN  Take 1 capsule (100 mg total) by mouth 2 (two) times daily.     * lancets Misc  To check BG 1 times daily, to use with insurance preferred meter     * lancets Misc  To check BG 3 times daily, to use with insurance preferred meter     minoxidiL 2.5 MG tablet  Commonly known as: LONITEN  Take 2 tablets by mouth 2 (two) times daily.     multivitamin per tablet  Commonly known as: THERAGRAN  Take 1 tablet by mouth once daily.     pantoprazole 40 MG tablet  Commonly known as: PROTONIX  Take 1 tablet (40 mg total) by mouth once daily.     pen needle, diabetic 32 gauge x 5/32" Ndle  Commonly known as: BD ULTRA-FINE ROBERT PEN NEEDLE  1 pen by Misc.(Non-Drug; Combo Route) route 4 (four) times daily. To use 4 times per day with insulin injections.     VELPHORO 500 mg Chew  Generic drug: sucroferric oxyhydroxide  Take 2 tablets by mouth 3 (three) times daily.           * This list has 2 medication(s) that are the same as other medications prescribed for you. Read the directions carefully, and ask your doctor or other care provider " to review them with you.                ASK your doctor about these medications      hydrALAZINE 25 MG tablet  Commonly known as: APRESOLINE  Take 1 tablet (25 mg total) by mouth 2 (two) times daily as needed (Systolic blood pressure > 170 mm Hg).     nitroGLYCERIN 0.4 MG SL tablet  Commonly known as: NITROSTAT  Place 1 tablet (0.4 mg total) under the tongue every 5 (five) minutes as needed for Chest pain.              Indwelling Lines/Drains at time of discharge:   Lines/Drains/Airways       Central Venous Catheter Line  Duration                  Hemodialysis Catheter right internal jugular -- days              Drain  Duration                  Hemodialysis AV Fistula Left upper arm -- days                    Time spent on the discharge of patient: 35 minutes         Shannan Varela MD  Department of Hospital Medicine  [facility]

## 2024-02-01 NOTE — PLAN OF CARE
Problem: Adult Inpatient Plan of Care  Goal: Plan of Care Review  Outcome: Met  Goal: Patient-Specific Goal (Individualized)  Outcome: Met  Goal: Absence of Hospital-Acquired Illness or Injury  Outcome: Met  Goal: Optimal Comfort and Wellbeing  Outcome: Met  Goal: Readiness for Transition of Care  Outcome: Met     Problem: Diabetes Comorbidity  Goal: Blood Glucose Level Within Targeted Range  Outcome: Met     Problem: Infection  Goal: Absence of Infection Signs and Symptoms  Outcome: Met     Problem: Impaired Wound Healing  Goal: Optimal Wound Healing  Outcome: Met     Problem: Device-Related Complication Risk (Hemodialysis)  Goal: Safe, Effective Therapy Delivery  Outcome: Met     Problem: Hemodynamic Instability (Hemodialysis)  Goal: Effective Tissue Perfusion  Outcome: Met     Problem: Infection (Hemodialysis)  Goal: Absence of Infection Signs and Symptoms  Outcome: Met

## 2024-02-01 NOTE — NURSING
Upon discharge pt and  received information regarding hospital stay, when to contact hcp, and future follow-up appointments. Pt and  verbalized an understanding of all the information discussed, questions/concerns were addressed accordingly. Pt's IV lines was discontinued per order, catheter tip intact no signs of bleeding/distress noted. Dressing applied to site. Wound care instructions discussed with pt and . GRACIELA, will continue to monitor.

## 2024-02-02 ENCOUNTER — PATIENT MESSAGE (OUTPATIENT)
Dept: PRIMARY CARE CLINIC | Facility: CLINIC | Age: 59
End: 2024-02-02
Payer: MEDICARE

## 2024-02-06 ENCOUNTER — OFFICE VISIT (OUTPATIENT)
Dept: WOUND CARE | Facility: HOSPITAL | Age: 59
End: 2024-02-06
Attending: PODIATRIST
Payer: MEDICARE

## 2024-02-06 VITALS
HEIGHT: 67 IN | DIASTOLIC BLOOD PRESSURE: 81 MMHG | TEMPERATURE: 99 F | RESPIRATION RATE: 16 BRPM | BODY MASS INDEX: 27.31 KG/M2 | HEART RATE: 80 BPM | SYSTOLIC BLOOD PRESSURE: 168 MMHG | WEIGHT: 174 LBS

## 2024-02-06 DIAGNOSIS — Z09 HOSPITAL DISCHARGE FOLLOW-UP: ICD-10-CM

## 2024-02-06 DIAGNOSIS — R60.0 BILATERAL LOWER EXTREMITY EDEMA: ICD-10-CM

## 2024-02-06 DIAGNOSIS — L97.523 ULCER OF LEFT FOOT WITH NECROSIS OF MUSCLE: Primary | ICD-10-CM

## 2024-02-06 DIAGNOSIS — Z79.4 TYPE 2 DIABETES MELLITUS WITH HYPOGLYCEMIA AND COMA, WITH LONG-TERM CURRENT USE OF INSULIN: ICD-10-CM

## 2024-02-06 DIAGNOSIS — Z89.432 HISTORY OF AMPUTATION OF LEFT FOOT: ICD-10-CM

## 2024-02-06 DIAGNOSIS — I73.9 PERIPHERAL VASCULAR DISEASE: ICD-10-CM

## 2024-02-06 DIAGNOSIS — E11.22 TYPE 2 DIABETES MELLITUS WITH CHRONIC KIDNEY DISEASE ON CHRONIC DIALYSIS, WITH LONG-TERM CURRENT USE OF INSULIN: ICD-10-CM

## 2024-02-06 DIAGNOSIS — Z99.2 TYPE 2 DIABETES MELLITUS WITH CHRONIC KIDNEY DISEASE ON CHRONIC DIALYSIS, WITH LONG-TERM CURRENT USE OF INSULIN: ICD-10-CM

## 2024-02-06 DIAGNOSIS — N18.6 TYPE 2 DIABETES MELLITUS WITH CHRONIC KIDNEY DISEASE ON CHRONIC DIALYSIS, WITH LONG-TERM CURRENT USE OF INSULIN: ICD-10-CM

## 2024-02-06 DIAGNOSIS — E11.641 TYPE 2 DIABETES MELLITUS WITH HYPOGLYCEMIA AND COMA, WITH LONG-TERM CURRENT USE OF INSULIN: ICD-10-CM

## 2024-02-06 DIAGNOSIS — L08.9 DIABETIC FOOT INFECTION: ICD-10-CM

## 2024-02-06 DIAGNOSIS — Z91.89 AT RISK FOR READMISSION TO HOSPITAL: ICD-10-CM

## 2024-02-06 DIAGNOSIS — Z87.2 HISTORY OF FOOT ULCER: ICD-10-CM

## 2024-02-06 DIAGNOSIS — E11.649 TYPE 2 DIABETES MELLITUS WITH HYPOGLYCEMIA UNAWARENESS: ICD-10-CM

## 2024-02-06 DIAGNOSIS — R09.89 DECREASED PEDAL PULSES: ICD-10-CM

## 2024-02-06 DIAGNOSIS — E11.40 TYPE 2 DIABETES MELLITUS WITH DIABETIC NEUROPATHY, UNSPECIFIED WHETHER LONG TERM INSULIN USE: ICD-10-CM

## 2024-02-06 DIAGNOSIS — B35.3 TINEA PEDIS OF BOTH FEET: ICD-10-CM

## 2024-02-06 DIAGNOSIS — L08.9 WOUND INFECTION: ICD-10-CM

## 2024-02-06 DIAGNOSIS — R26.2 DIFFICULTY IN WALKING, NOT ELSEWHERE CLASSIFIED: ICD-10-CM

## 2024-02-06 DIAGNOSIS — Z86.73 HISTORY OF TIA (TRANSIENT ISCHEMIC ATTACK): ICD-10-CM

## 2024-02-06 DIAGNOSIS — L60.2 ONYCHOGRYPHOSIS: ICD-10-CM

## 2024-02-06 DIAGNOSIS — T14.8XXA WOUND INFECTION: ICD-10-CM

## 2024-02-06 DIAGNOSIS — Z87.2 HISTORY OF ULCER OF LOWER EXTREMITY: ICD-10-CM

## 2024-02-06 DIAGNOSIS — L97.522 ULCER OF LEFT FOOT WITH FAT LAYER EXPOSED: ICD-10-CM

## 2024-02-06 DIAGNOSIS — Z79.4 TYPE 2 DIABETES MELLITUS WITH CHRONIC KIDNEY DISEASE ON CHRONIC DIALYSIS, WITH LONG-TERM CURRENT USE OF INSULIN: ICD-10-CM

## 2024-02-06 DIAGNOSIS — E11.628 DIABETIC FOOT INFECTION: ICD-10-CM

## 2024-02-06 DIAGNOSIS — N18.6 ESRD (END STAGE RENAL DISEASE): ICD-10-CM

## 2024-02-06 DIAGNOSIS — Z91.89 AT HIGH RISK FOR INADEQUATE NUTRITIONAL INTAKE: ICD-10-CM

## 2024-02-06 PROCEDURE — 3044F HG A1C LEVEL LT 7.0%: CPT | Mod: HCNC,CPTII,, | Performed by: PODIATRIST

## 2024-02-06 PROCEDURE — 3066F NEPHROPATHY DOC TX: CPT | Mod: HCNC,CPTII,, | Performed by: PODIATRIST

## 2024-02-06 PROCEDURE — 11042 DBRDMT SUBQ TIS 1ST 20SQCM/<: CPT | Mod: HCNC,,, | Performed by: PODIATRIST

## 2024-02-06 PROCEDURE — 3077F SYST BP >= 140 MM HG: CPT | Mod: HCNC,CPTII,, | Performed by: PODIATRIST

## 2024-02-06 PROCEDURE — 1160F RVW MEDS BY RX/DR IN RCRD: CPT | Mod: HCNC,CPTII,, | Performed by: PODIATRIST

## 2024-02-06 PROCEDURE — 1111F DSCHRG MED/CURRENT MED MERGE: CPT | Mod: HCNC,CPTII,, | Performed by: PODIATRIST

## 2024-02-06 PROCEDURE — 99214 OFFICE O/P EST MOD 30 MIN: CPT | Mod: 25,HCNC,, | Performed by: PODIATRIST

## 2024-02-06 PROCEDURE — 1159F MED LIST DOCD IN RCRD: CPT | Mod: HCNC,CPTII,, | Performed by: PODIATRIST

## 2024-02-06 PROCEDURE — 3008F BODY MASS INDEX DOCD: CPT | Mod: HCNC,CPTII,, | Performed by: PODIATRIST

## 2024-02-06 PROCEDURE — 3079F DIAST BP 80-89 MM HG: CPT | Mod: HCNC,CPTII,, | Performed by: PODIATRIST

## 2024-02-06 PROCEDURE — 11042 DBRDMT SUBQ TIS 1ST 20SQCM/<: CPT | Mod: HCNC,PN | Performed by: PODIATRIST

## 2024-02-07 NOTE — PROGRESS NOTES
[address] [facility] [address]  [place] [address]  Phone: [phone]   [phone]    Patient's PCP:Stefano Lopez MD  Referring Provider: Aaareferral Self    Subjective:      Chief Complaint:: Wound Care, Wound Consult, Diabetes, Diabetic Foot Ulcer, Foot Ulcer, Wound Check, Non-healing Wound, Pressure Ulcer, Numbness, Peripheral Neuropathy, Poor Circulation, Hospital Follow Up, and Difficulty Walking    HPI  Leanna García is a 58 y.o. female who presents today for follow up of left plantar medial foot diabetic foot ulcer and left medial 3rd toe diabetic foot ulcer.  See wound docs documentation for full assessment and evaluation of all wounds.     Additionally, patient presents following recent hospital admission.  Reviewed all notes from patients medical chart from recent hospital admssion, including imaging, procedures, studies, progress notes,  cultures, antibiotic regimens, photos,  etc.        Additionally, patient follows with vascular surgery. Discussed with patient follow up for possible vascular intervention to left lower extremity.       EVALUATION, ASSESSMENT, & PLAN:      1. Debridement   See Wound Docs for assessment of wounds and procedure notes  2. Continue taking all medications as prescribed  3. Dressing changes, see Wound docs for dressing change orders  4. RTC one week   5. Counseled patient on increasing protein intake, not getting wound wet, keeping dressing clean dry and intact, following a healthy diet, elevating legs when able, removing pressure from wound     Total time spent for E&M 40  Total time for debridement 35 minutes      Proper ulcer care and the possible need for serial debridements, topical medications, specific dressings and biological engineered skin substitutes if indicated.        Counseling/Education:  I provided patient education verbally regarding:   The aspects of diabetes and how it pertains to the feet. I explained the importance of proper diabetic  foot care and how it is essential for the health of their feet.     I discussed the importance of knowing their Hemoglobin A1c and that the level needs to be as close to 6 as possible. I discussed the increase complications of high blood sugar including stroke, blindness, heart attack, kidney failure and loss of limb secondary to neuropathy and PVD.      With neuropathy, beware of any breaks in the skin or redness. These areas are not recognized early due to the numbness.     I discussed Diabetes, lower back issues, metabolic disorders, systemic causes, chemotherapy, vitamin deficiency, heavy metal exposure, as some of the causes. I also explained that as much as 40% of the time we can not find a cause. I discussed different treatments available to control the symptoms but which may not cure the problem.         Counseled patient on the aspects of diabetes and how it pertains to the feet.  I explained the importance of proper diabetic foot care and how it is essential for the health of their feet.        Shoe inspection. Patient instructed on proper foot hygeine. We discussed wearing proper shoe gear, daily foot inspections, never walking without protective shoe gear, never putting sharp instruments to feet, routine podiatric nail visits every 2-3 months.          Patient should call the office immediately if any signs of infection, such as fever, chills, sweats, increased redness or pain.     Patient was instructed to call the clinic or go to the emergency department if their symptoms do not improve, worsens, or if new symptoms develop.  Patient was advised that if any increased swelling, pain, or numbness arise to go immediately to the ED. Patient knows to call any time if an emergency arises. Shared decision making occurred and patient verbalized understanding in agreement with this plan.         >50% of this > 60 minute visit was spent face to face educating/counseling the patient     I spent a total of 60  "minutes on the day of the visit.This includes face to face time and non-face to face time preparing to see the patient (eg, review of tests), obtaining and/or reviewing separately obtained history, documenting clinical information in the electronic or other health record, independently interpreting results and communicating results to the patient/family/caregiver, or care coordinator.        Much of the documentation for this visit was completed in the Wound Docs system.  Please see the attached documentation for further details about the patient's care. Scanned under the Media tab.         Alivia Bruno DPM   Vitals:    02/06/24 0919   BP: (!) 168/81   Pulse: 80   Resp: 16   Temp: 98.5 °F (36.9 °C)   Weight: 78.9 kg (174 lb)   Height: 5' 7" (1.702 m)   PainSc: 0-No pain      Shoe Size:     Past Surgical History:   Procedure Laterality Date    ANGIOGRAPHY OF LOWER EXTREMITY Left 10/18/2022    Procedure: Angiogram Extremity Unilateral;  Surgeon: Ali Khoobehi, MD;  Location: [facility] CATH/EP LAB;  Service: Vascular;  Laterality: Left;    AV FISTULA PLACEMENT Left 8/29/2022    Procedure: CREATION, AV FISTULA;  Surgeon: Ali Khoobehi, MD;  Location: [facility] OR;  Service: Vascular;  Laterality: Left;    BONE BIOPSY Left 8/24/2022    Procedure: Biopsy-Bone;  Surgeon: Porfirio Ponce DPM;  Location: [facility] OR;  Service: Podiatry;  Laterality: Left;    COLONOSCOPY N/A 10/5/2016    Procedure: COLONOSCOPY;  Surgeon: Pillo Chanel MD;  Location: [facility] ENDO;  Service: Endoscopy;  Laterality: N/A;    COLONOSCOPY N/A 4/26/2022    Procedure: COLONOSCOPY;  Surgeon: Saravanan Rachel MD;  Location: [facility] ENDO;  Service: Endoscopy;  Laterality: N/A;    FISTULOGRAM Left 8/24/2022    Procedure: Fistulogram;  Surgeon: Ali Khoobehi, MD;  Location: [facility] CATH/EP LAB;  Service: Vascular;  Laterality: Left;    FISTULOGRAM Left 1/31/2024    Procedure: Fistulogram;  Surgeon: Khoobehi, Ali, MD;  Location: [facility] CATH/EP LAB;  Service: Vascular;  " Laterality: Left;    HERNIA REPAIR Bilateral 11/22/2016    inguinal    HYSTERECTOMY      INCISION AND DRAINAGE FOOT Left 5/13/2022    Procedure: INCISION AND DRAINAGE, FOOT;  Surgeon: Ricardo Bruno DPM;  Location: [facility] OR;  Service: Podiatry;  Laterality: Left;    OOPHORECTOMY      PERCUTANEOUS TRANSLUMINAL ANGIOPLASTY OF ARTERIOVENOUS FISTULA Left 1/31/2024    Procedure: PTA, AV FISTULA;  Surgeon: Khoobehi, Ali, MD;  Location: [facility] CATH/EP LAB;  Service: Vascular;  Laterality: Left;    THROMBECTOMY, AV FISTULA, UPPER EXTREMITY, PERCUTANEOUS  8/24/2022    Procedure: THROMBECTOMY, AV FISTULA, UPPER EXTREMITY, PERCUTANEOUS;  Surgeon: Ali Khoobehi, MD;  Location: [facility] CATH/EP LAB;  Service: Vascular;;    TOE AMPUTATION Left 8/26/2022    Procedure: AMPUTATION, TOE;  Surgeon: Porfirio Ponce DPM;  Location: [facility] OR;  Service: Podiatry;  Laterality: Left;     Past Medical History:   Diagnosis Date    A-fib     resolved per patient    Anemia due to end stage renal disease 6/30/2022    Arthritis     Bronchitis     Cardiovascular event risk, ASCVD 10-year risk 6.7% 10/15/2016    Diabetes mellitus type II     Diabetic foot infection     Diabetic ulcer of left midfoot 05/05/2022    Disorder of kidney and ureter     Encounter for blood transfusion     ESRD (end stage renal disease) 05/18/2018    MANJINDER (generalized anxiety disorder) 10/25/2016    History of colon polyps 11/02/2016    Hyperlipidemia     Hypertension     Stroke      Family History   Problem Relation Age of Onset    Hyperlipidemia Mother     Hypertension Mother     Hypertension Father     Diabetes Father     Diabetes Sister     Diabetes Sister     Diabetes Maternal Aunt     Diabetes Maternal Grandmother     Heart disease Maternal Grandmother     Cancer Paternal Grandmother     Breast cancer Neg Hx     Colon cancer Neg Hx     Ovarian cancer Neg Hx         Social History:   Marital Status:   Alcohol History:  reports no history of alcohol use.  Tobacco  History:  reports that she has never smoked. She has never used smokeless tobacco.  Drug History:  reports no history of drug use.    Review of patient's allergies indicates:   Allergen Reactions    Dilaudid [hydromorphone] Nausea And Vomiting    Chlorhexidine Itching    Lortab [hydrocodone-acetaminophen] Itching       Current Outpatient Medications   Medication Sig Dispense Refill    acetaminophen (TYLENOL) 325 MG tablet Take 325 mg by mouth every 6 (six) hours.      albuterol (PROVENTIL/VENTOLIN HFA) 90 mcg/actuation inhaler Inhale 2 puffs into the lungs every 4 to 6 hours as needed for Shortness of Breath or Wheezing.      ALPRAZolam (XANAX) 0.5 MG tablet Take 1 tablet by mouth.      amLODIPine (NORVASC) 5 MG tablet Take 1 tablet by mouth once daily.      aspirin (ECOTRIN) 81 MG EC tablet Take 1 tablet (81 mg total) by mouth once daily.  0    atorvastatin (LIPITOR) 80 MG tablet Take 1 tablet (80 mg total) by mouth once daily. 90 tablet 3    blood sugar diagnostic Strp To check BG 4 times daily, to use with insurance preferred meter 450 strip 3    blood-glucose meter kit To check BG 3 times daily, to use with insurance preferred meter 1 each 0    cetirizine (ZYRTEC) 10 MG tablet Take 1 tablet (10 mg total) by mouth every other day. 45 tablet 3    cinacalcet (SENSIPAR) 60 MG Tab Take 2 tablets by mouth 2 (two) times daily with meals.      clopidogreL (PLAVIX) 75 mg tablet Take 1 tablet (75 mg total) by mouth once daily. 90 tablet 3    collagenase (SANTYL) ointment Apply topically once daily. 30 g 0    doxycycline (VIBRAMYCIN) 100 MG Cap Take 100 mg by mouth 2 (two) times daily.      epoetin chris-epbx (RETACRIT) 4,000 unit/mL injection Inject 1.11 mLs (4,440 Units total) into the skin every Mon, Wed, Fri.      gabapentin (NEURONTIN) 100 MG capsule Take 1 capsule (100 mg total) by mouth 2 (two) times daily. 180 capsule 3    hydrALAZINE (APRESOLINE) 25 MG tablet Take 1 tablet (25 mg total) by mouth 2 (two) times daily  "as needed (Systolic blood pressure > 170 mm Hg). (Patient not taking: Reported on 1/25/2024)      lancets [facility] To check BG 1 times daily, to use with insurance preferred meter 100 each 11    lancets Misc To check BG 3 times daily, to use with insurance preferred meter 100 each 1    minoxidiL (LONITEN) 2.5 MG tablet Take 2 tablets by mouth 2 (two) times daily.      multivitamin (THERAGRAN) per tablet Take 1 tablet by mouth once daily.      nitroGLYCERIN (NITROSTAT) 0.4 MG SL tablet Place 1 tablet (0.4 mg total) under the tongue every 5 (five) minutes as needed for Chest pain. (Patient not taking: Reported on 1/25/2024) 30 tablet 0    OZEMPIC 0.25 mg or 0.5 mg (2 mg/3 mL) pen injector INJECT 0.25 MG SUBCUTANEOUSLY ONCE A WEEK (Patient taking differently: Inject 0.25 mg into the skin every 7 days. INJECT 0.25 MG SUBCUTANEOUSLY ONCE A WEEK) 1.5 mL 2    pantoprazole (PROTONIX) 40 MG tablet Take 1 tablet (40 mg total) by mouth once daily. 30 tablet 2    pen needle, diabetic (BD ULTRA-FINE ROBERT PEN NEEDLE) 32 gauge x 5/32" Ndle 1 pen by Misc.(Non-Drug; Combo Route) route 4 (four) times daily. To use 4 times per day with insulin injections. 100 each 1    sucroferric oxyhydroxide (VELPHORO) 500 mg Chew Take 2 tablets by mouth 3 (three) times daily.       No current facility-administered medications for this visit.       Review of Systems   Constitutional:  Negative for chills, fatigue, fever and unexpected weight change.   HENT:  Negative for hearing loss and trouble swallowing.    Eyes:  Negative for photophobia and visual disturbance.   Respiratory:  Negative for cough, shortness of breath and wheezing.    Cardiovascular:  Negative for chest pain, palpitations and leg swelling.   Gastrointestinal:  Negative for abdominal pain and nausea.   Genitourinary:  Negative for dysuria and frequency.   Musculoskeletal:  Positive for gait problem. Negative for arthralgias, back pain, joint swelling and myalgias.   Skin:  Positive for " wound. Negative for rash.   Neurological:  Positive for numbness. Negative for tremors, seizures, weakness and headaches.   Hematological:  Does not bruise/bleed easily.         Objective:        Physical Exam:   Foot Exam  Physical Exam  Ortho/SPM Exam     Imaging:            Assessment:       1. Ulcer of left foot with necrosis of muscle    2. History of ulcer of lower extremity    3. Ulcer of left foot with fat layer exposed    4. Type 2 diabetes mellitus with chronic kidney disease on chronic dialysis, with long-term current use of insulin    5. At high risk for inadequate nutritional intake    6. Peripheral vascular disease    7. 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 At risk for readmission to hospital    9. Wound infection    10. Difficulty in walking, not elsewhere classified    11. Bilateral lower extremity edema    12. Diabetic foot infection    13. Type 2 diabetes mellitus with diabetic neuropathy, unspecified whether long term insulin use    14. Type 2 diabetes mellitus with hypoglycemia and coma, with long-term current use of insulin    15. History of amputation of left foot    16. Tinea pedis of both feet    17. Onychogryphosis    18. Decreased pedal pulses    19. ESRD (end stage renal disease)    20. History of TIA (transient ischemic attack)    21. History of foot ulcer    22. Hospital discharge follow-up      Plan:   Ulcer of left foot with necrosis of muscle    History of ulcer of lower extremity    Ulcer of left foot with fat layer exposed    Type 2 diabetes mellitus with chronic kidney disease on chronic dialysis, with long-term current use of insulin    At high risk for inadequate nutritional intake    Peripheral vascular disease    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    At risk for readmission to hospital    Wound infection    Difficulty in walking, not elsewhere classified    Bilateral lower extremity edema    Diabetic foot infection    Type 2 diabetes  mellitus with diabetic neuropathy, unspecified whether long term insulin use    Type 2 diabetes mellitus with hypoglycemia and coma, with long-term current use of insulin    History of amputation of left foot    Tinea pedis of both feet    Onychogryphosis    Decreased pedal pulses    ESRD (end stage renal disease)    History of TIA (transient ischemic attack)    History of foot ulcer    Hospital discharge follow-up      Follow up in about 1 week (around 2/13/2024).    Procedures          Counseling:     I provided patient education verbally regarding:   Patient diagnosis, treatment options, as well as alternatives, risks, and benefits.     This note was created using Dragon voice recognition software that occasionally misinterpreted phrases or words.

## 2024-02-12 ENCOUNTER — TELEPHONE (OUTPATIENT)
Dept: CARDIOLOGY | Facility: CLINIC | Age: 59
End: 2024-02-12
Payer: MEDICARE

## 2024-02-12 ENCOUNTER — OFFICE VISIT (OUTPATIENT)
Dept: WOUND CARE | Facility: HOSPITAL | Age: 59
End: 2024-02-12
Attending: PODIATRIST
Payer: MEDICARE

## 2024-02-12 VITALS
HEART RATE: 80 BPM | SYSTOLIC BLOOD PRESSURE: 160 MMHG | RESPIRATION RATE: 17 BRPM | TEMPERATURE: 99 F | DIASTOLIC BLOOD PRESSURE: 88 MMHG

## 2024-02-12 DIAGNOSIS — L97.522 ULCER OF LEFT FOOT WITH FAT LAYER EXPOSED: ICD-10-CM

## 2024-02-12 DIAGNOSIS — I15.2 HYPERTENSION ASSOCIATED WITH DIABETES: ICD-10-CM

## 2024-02-12 DIAGNOSIS — B35.3 TINEA PEDIS OF BOTH FEET: ICD-10-CM

## 2024-02-12 DIAGNOSIS — L02.619 CELLULITIS AND ABSCESS OF FOOT: ICD-10-CM

## 2024-02-12 DIAGNOSIS — N18.6 ESRD (END STAGE RENAL DISEASE): ICD-10-CM

## 2024-02-12 DIAGNOSIS — R26.2 DIFFICULTY IN WALKING, NOT ELSEWHERE CLASSIFIED: ICD-10-CM

## 2024-02-12 DIAGNOSIS — L03.119 CELLULITIS AND ABSCESS OF FOOT: ICD-10-CM

## 2024-02-12 DIAGNOSIS — R09.89 DECREASED PEDAL PULSES: ICD-10-CM

## 2024-02-12 DIAGNOSIS — Z91.89 AT HIGH RISK FOR INADEQUATE NUTRITIONAL INTAKE: ICD-10-CM

## 2024-02-12 DIAGNOSIS — E11.621 DIABETIC ULCER OF LEFT MIDFOOT ASSOCIATED WITH TYPE 2 DIABETES MELLITUS, WITH NECROSIS OF MUSCLE: ICD-10-CM

## 2024-02-12 DIAGNOSIS — T14.8XXA WOUND INFECTION: ICD-10-CM

## 2024-02-12 DIAGNOSIS — Z79.4 TYPE 2 DIABETES MELLITUS WITH CHRONIC KIDNEY DISEASE ON CHRONIC DIALYSIS, WITH LONG-TERM CURRENT USE OF INSULIN: ICD-10-CM

## 2024-02-12 DIAGNOSIS — R60.0 BILATERAL LOWER EXTREMITY EDEMA: ICD-10-CM

## 2024-02-12 DIAGNOSIS — L97.523 ULCER OF LEFT FOOT WITH NECROSIS OF MUSCLE: Primary | ICD-10-CM

## 2024-02-12 DIAGNOSIS — I73.9 PERIPHERAL VASCULAR DISEASE: ICD-10-CM

## 2024-02-12 DIAGNOSIS — E11.649 TYPE 2 DIABETES MELLITUS WITH HYPOGLYCEMIA UNAWARENESS: ICD-10-CM

## 2024-02-12 DIAGNOSIS — E11.22 TYPE 2 DIABETES MELLITUS WITH CHRONIC KIDNEY DISEASE ON CHRONIC DIALYSIS, WITH LONG-TERM CURRENT USE OF INSULIN: ICD-10-CM

## 2024-02-12 DIAGNOSIS — Z79.4 TYPE 2 DIABETES MELLITUS WITH HYPOGLYCEMIA AND COMA, WITH LONG-TERM CURRENT USE OF INSULIN: ICD-10-CM

## 2024-02-12 DIAGNOSIS — E11.641 TYPE 2 DIABETES MELLITUS WITH HYPOGLYCEMIA AND COMA, WITH LONG-TERM CURRENT USE OF INSULIN: ICD-10-CM

## 2024-02-12 DIAGNOSIS — Z99.2 TYPE 2 DIABETES MELLITUS WITH CHRONIC KIDNEY DISEASE ON CHRONIC DIALYSIS, WITH LONG-TERM CURRENT USE OF INSULIN: ICD-10-CM

## 2024-02-12 DIAGNOSIS — Z91.89 AT RISK FOR READMISSION TO HOSPITAL: ICD-10-CM

## 2024-02-12 DIAGNOSIS — L60.2 ONYCHOGRYPHOSIS: ICD-10-CM

## 2024-02-12 DIAGNOSIS — L97.423 DIABETIC ULCER OF LEFT MIDFOOT ASSOCIATED WITH TYPE 2 DIABETES MELLITUS, WITH NECROSIS OF MUSCLE: ICD-10-CM

## 2024-02-12 DIAGNOSIS — L08.9 DIABETIC FOOT INFECTION: ICD-10-CM

## 2024-02-12 DIAGNOSIS — E11.628 DIABETIC FOOT INFECTION: ICD-10-CM

## 2024-02-12 DIAGNOSIS — E11.59 HYPERTENSION ASSOCIATED WITH DIABETES: ICD-10-CM

## 2024-02-12 DIAGNOSIS — N18.6 TYPE 2 DIABETES MELLITUS WITH CHRONIC KIDNEY DISEASE ON CHRONIC DIALYSIS, WITH LONG-TERM CURRENT USE OF INSULIN: ICD-10-CM

## 2024-02-12 DIAGNOSIS — L08.9 WOUND INFECTION: ICD-10-CM

## 2024-02-12 DIAGNOSIS — E11.40 TYPE 2 DIABETES MELLITUS WITH DIABETIC NEUROPATHY, UNSPECIFIED WHETHER LONG TERM INSULIN USE: ICD-10-CM

## 2024-02-12 PROCEDURE — 3077F SYST BP >= 140 MM HG: CPT | Mod: HCNC,CPTII,, | Performed by: PODIATRIST

## 2024-02-12 PROCEDURE — 3079F DIAST BP 80-89 MM HG: CPT | Mod: HCNC,CPTII,, | Performed by: PODIATRIST

## 2024-02-12 PROCEDURE — 3066F NEPHROPATHY DOC TX: CPT | Mod: HCNC,CPTII,, | Performed by: PODIATRIST

## 2024-02-12 PROCEDURE — 99214 OFFICE O/P EST MOD 30 MIN: CPT | Mod: HCNC,,, | Performed by: PODIATRIST

## 2024-02-12 PROCEDURE — 99213 OFFICE O/P EST LOW 20 MIN: CPT | Mod: HCNC,PN | Performed by: PODIATRIST

## 2024-02-12 PROCEDURE — 1160F RVW MEDS BY RX/DR IN RCRD: CPT | Mod: HCNC,CPTII,, | Performed by: PODIATRIST

## 2024-02-12 PROCEDURE — 1159F MED LIST DOCD IN RCRD: CPT | Mod: HCNC,CPTII,, | Performed by: PODIATRIST

## 2024-02-12 PROCEDURE — 3044F HG A1C LEVEL LT 7.0%: CPT | Mod: HCNC,CPTII,, | Performed by: PODIATRIST

## 2024-02-12 PROCEDURE — 1111F DSCHRG MED/CURRENT MED MERGE: CPT | Mod: HCNC,CPTII,, | Performed by: PODIATRIST

## 2024-02-12 RX ORDER — DOXYCYCLINE 100 MG/1
CAPSULE ORAL
Qty: 28 CAPSULE | Refills: 0 | Status: SHIPPED | OUTPATIENT
Start: 2024-02-12

## 2024-02-12 NOTE — PROGRESS NOTES
[address] [facility] [address]  [place] [address]  Phone: [phone]   [phone]    Patient's PCP:Stefano Lopez MD  Referring Provider: Aaareferral Self    Subjective:      Chief Complaint:: Wound Care, Diabetic Foot Ulcer, Pressure Ulcer, Wound Consult, Diabetes, Foot Ulcer, Non-healing Wound, Wound Check, Numbness, Peripheral Neuropathy, and Difficulty Walking    HPI  Leanna García is a 58 y.o. female who presents today for follow up of left plantar medial foot diabetic foot ulcer and left medial 3rd toe diabetic foot ulcer.  See wound docs documentation for full assessment and evaluation of all wounds.      Additionally, patient presents following recent hospital admission.  Reviewed all notes from patients medical chart from recent hospital admssion, including imaging, procedures, studies, progress notes,  cultures, antibiotic regimens, photos,  etc.         Additionally, patient follows with vascular surgery. Discussed with patient follow up for possible vascular intervention to left lower extremity.       EVALUATION, ASSESSMENT, & PLAN:      1.   See Wound Docs for assessment of wounds and procedure notes  2. Continue taking all medications as prescribed  3. Dressing changes, see Wound docs for dressing change orders  4. RTC one week   5. Counseled patient on increasing protein intake, not getting wound wet, keeping dressing clean dry and intact, following a healthy diet, elevating legs when able, removing pressure from wound     Total time spent for E&M 40  Total time for debridement 35 minutes      Proper ulcer care and the possible need for serial debridements, topical medications, specific dressings and biological engineered skin substitutes if indicated.        Counseling/Education:  I provided patient education verbally regarding:   The aspects of diabetes and how it pertains to the feet. I explained the importance of proper diabetic foot care and how it is essential for the health  of their feet.     I discussed the importance of knowing their Hemoglobin A1c and that the level needs to be as close to 6 as possible. I discussed the increase complications of high blood sugar including stroke, blindness, heart attack, kidney failure and loss of limb secondary to neuropathy and PVD.      With neuropathy, beware of any breaks in the skin or redness. These areas are not recognized early due to the numbness.     I discussed Diabetes, lower back issues, metabolic disorders, systemic causes, chemotherapy, vitamin deficiency, heavy metal exposure, as some of the causes. I also explained that as much as 40% of the time we can not find a cause. I discussed different treatments available to control the symptoms but which may not cure the problem.         Counseled patient on the aspects of diabetes and how it pertains to the feet.  I explained the importance of proper diabetic foot care and how it is essential for the health of their feet.        Shoe inspection. Patient instructed on proper foot hygeine. We discussed wearing proper shoe gear, daily foot inspections, never walking without protective shoe gear, never putting sharp instruments to feet, routine podiatric nail visits every 2-3 months.          Patient should call the office immediately if any signs of infection, such as fever, chills, sweats, increased redness or pain.     Patient was instructed to call the clinic or go to the emergency department if their symptoms do not improve, worsens, or if new symptoms develop.  Patient was advised that if any increased swelling, pain, or numbness arise to go immediately to the ED. Patient knows to call any time if an emergency arises. Shared decision making occurred and patient verbalized understanding in agreement with this plan.         >50% of this > 60 minute visit was spent face to face educating/counseling the patient     I spent a total of 60 minutes on the day of the visit.This includes face to  face time and non-face to face time preparing to see the patient (eg, review of tests), obtaining and/or reviewing separately obtained history, documenting clinical information in the electronic or other health record, independently interpreting results and communicating results to the patient/family/caregiver, or care coordinator.        Much of the documentation for this visit was completed in the Wound Docs system.  Please see the attached documentation for further details about the patient's care. Scanned under the Media tab.         Alivia Bruno DPM   Vitals:    02/12/24 1203   BP: (!) 160/88   Pulse: 80   Resp: 17   Temp: 98.5 °F (36.9 °C)   TempSrc: Temporal   PainSc: 0-No pain   PainLoc: Foot      Shoe Size:     Past Surgical History:   Procedure Laterality Date    ANGIOGRAPHY OF LOWER EXTREMITY Left 10/18/2022    Procedure: Angiogram Extremity Unilateral;  Surgeon: Ali Khoobehi, MD;  Location: [facility] CATH/EP LAB;  Service: Vascular;  Laterality: Left;    AV FISTULA PLACEMENT Left 8/29/2022    Procedure: CREATION, AV FISTULA;  Surgeon: Ali Khoobehi, MD;  Location: [facility] OR;  Service: Vascular;  Laterality: Left;    BONE BIOPSY Left 8/24/2022    Procedure: Biopsy-Bone;  Surgeon: Porfirio Ponce DPM;  Location: [facility] OR;  Service: Podiatry;  Laterality: Left;    COLONOSCOPY N/A 10/5/2016    Procedure: COLONOSCOPY;  Surgeon: Pillo Chanel MD;  Location: [facility] ENDO;  Service: Endoscopy;  Laterality: N/A;    COLONOSCOPY N/A 4/26/2022    Procedure: COLONOSCOPY;  Surgeon: Saravanan Rachel MD;  Location: [facility] ENDO;  Service: Endoscopy;  Laterality: N/A;    FISTULOGRAM Left 8/24/2022    Procedure: Fistulogram;  Surgeon: Ali Khoobehi, MD;  Location: [facility] CATH/EP LAB;  Service: Vascular;  Laterality: Left;    FISTULOGRAM Left 1/31/2024    Procedure: Fistulogram;  Surgeon: Khoobehi, Ali, MD;  Location: [facility] CATH/EP LAB;  Service: Vascular;  Laterality: Left;    HERNIA REPAIR Bilateral 11/22/2016    inguinal     HYSTERECTOMY      INCISION AND DRAINAGE FOOT Left 5/13/2022    Procedure: INCISION AND DRAINAGE, FOOT;  Surgeon: Ricardo Bruno DPM;  Location: [facility] OR;  Service: Podiatry;  Laterality: Left;    OOPHORECTOMY      PERCUTANEOUS TRANSLUMINAL ANGIOPLASTY OF ARTERIOVENOUS FISTULA Left 1/31/2024    Procedure: PTA, AV FISTULA;  Surgeon: Khoobehi, Ali, MD;  Location: [facility] CATH/EP LAB;  Service: Vascular;  Laterality: Left;    THROMBECTOMY, AV FISTULA, UPPER EXTREMITY, PERCUTANEOUS  8/24/2022    Procedure: THROMBECTOMY, AV FISTULA, UPPER EXTREMITY, PERCUTANEOUS;  Surgeon: Ali Khoobehi, MD;  Location: [facility] CATH/EP LAB;  Service: Vascular;;    TOE AMPUTATION Left 8/26/2022    Procedure: AMPUTATION, TOE;  Surgeon: Porfirio Ponce DPM;  Location: [facility] OR;  Service: Podiatry;  Laterality: Left;     Past Medical History:   Diagnosis Date    A-fib     resolved per patient    Anemia due to end stage renal disease 6/30/2022    Arthritis     Bronchitis     Cardiovascular event risk, ASCVD 10-year risk 6.7% 10/15/2016    Diabetes mellitus type II     Diabetic foot infection     Diabetic ulcer of left midfoot 05/05/2022    Disorder of kidney and ureter     Encounter for blood transfusion     ESRD (end stage renal disease) 05/18/2018    MANJINDER (generalized anxiety disorder) 10/25/2016    History of colon polyps 11/02/2016    Hyperlipidemia     Hypertension     Stroke      Family History   Problem Relation Age of Onset    Hyperlipidemia Mother     Hypertension Mother     Hypertension Father     Diabetes Father     Diabetes Sister     Diabetes Sister     Diabetes Maternal Aunt     Diabetes Maternal Grandmother     Heart disease Maternal Grandmother     Cancer Paternal Grandmother     Breast cancer Neg Hx     Colon cancer Neg Hx     Ovarian cancer Neg Hx         Social History:   Marital Status:   Alcohol History:  reports no history of alcohol use.  Tobacco History:  reports that she has never smoked. She has never used smokeless  tobacco.  Drug History:  reports no history of drug use.    Review of patient's allergies indicates:   Allergen Reactions    Dilaudid [hydromorphone] Nausea And Vomiting    Chlorhexidine Itching    Lortab [hydrocodone-acetaminophen] Itching       Current Outpatient Medications   Medication Sig Dispense Refill    acetaminophen (TYLENOL) 325 MG tablet Take 325 mg by mouth every 6 (six) hours.      albuterol (PROVENTIL/VENTOLIN HFA) 90 mcg/actuation inhaler Inhale 2 puffs into the lungs every 4 to 6 hours as needed for Shortness of Breath or Wheezing.      ALPRAZolam (XANAX) 0.5 MG tablet Take 1 tablet by mouth.      amLODIPine (NORVASC) 5 MG tablet Take 1 tablet by mouth once daily.      aspirin (ECOTRIN) 81 MG EC tablet Take 1 tablet (81 mg total) by mouth once daily.  0    atorvastatin (LIPITOR) 80 MG tablet Take 1 tablet (80 mg total) by mouth once daily. 90 tablet 3    blood sugar diagnostic Strp To check BG 4 times daily, to use with insurance preferred meter 450 strip 3    blood-glucose meter kit To check BG 3 times daily, to use with insurance preferred meter 1 each 0    cetirizine (ZYRTEC) 10 MG tablet Take 1 tablet (10 mg total) by mouth every other day. 45 tablet 3    cinacalcet (SENSIPAR) 60 MG Tab Take 2 tablets by mouth 2 (two) times daily with meals.      clopidogreL (PLAVIX) 75 mg tablet Take 1 tablet (75 mg total) by mouth once daily. 90 tablet 3    collagenase (SANTYL) ointment Apply topically once daily. 30 g 0    doxycycline (MONODOX) 100 MG capsule TAKE 1 CAPSULE BY MOUTH TWICE DAILY FOR 14 DAYS 28 capsule 0    doxycycline (VIBRAMYCIN) 100 MG Cap Take 100 mg by mouth 2 (two) times daily.      epoetin chris-epbx (RETACRIT) 4,000 unit/mL injection Inject 1.11 mLs (4,440 Units total) into the skin every Mon, Wed, Fri.      gabapentin (NEURONTIN) 100 MG capsule Take 1 capsule (100 mg total) by mouth 2 (two) times daily. 180 capsule 3    hydrALAZINE (APRESOLINE) 25 MG tablet Take 1 tablet (25 mg total)  "by mouth 2 (two) times daily as needed (Systolic blood pressure > 170 mm Hg). (Patient not taking: Reported on 1/25/2024)      lancets [facility] To check BG 1 times daily, to use with insurance preferred meter 100 each 11    lancets Misc To check BG 3 times daily, to use with insurance preferred meter 100 each 1    minoxidiL (LONITEN) 2.5 MG tablet Take 2 tablets by mouth 2 (two) times daily.      multivitamin (THERAGRAN) per tablet Take 1 tablet by mouth once daily.      nitroGLYCERIN (NITROSTAT) 0.4 MG SL tablet Place 1 tablet (0.4 mg total) under the tongue every 5 (five) minutes as needed for Chest pain. (Patient not taking: Reported on 1/25/2024) 30 tablet 0    OZEMPIC 0.25 mg or 0.5 mg (2 mg/3 mL) pen injector INJECT 0.25 MG SUBCUTANEOUSLY ONCE A WEEK (Patient taking differently: Inject 0.25 mg into the skin every 7 days. INJECT 0.25 MG SUBCUTANEOUSLY ONCE A WEEK) 1.5 mL 2    pantoprazole (PROTONIX) 40 MG tablet Take 1 tablet (40 mg total) by mouth once daily. 30 tablet 2    pen needle, diabetic (BD ULTRA-FINE ROBERT PEN NEEDLE) 32 gauge x 5/32" Ndle 1 pen by Misc.(Non-Drug; Combo Route) route 4 (four) times daily. To use 4 times per day with insulin injections. 100 each 1    sucroferric oxyhydroxide (VELPHORO) 500 mg Chew Take 2 tablets by mouth 3 (three) times daily.       No current facility-administered medications for this visit.       Review of Systems   Constitutional:  Negative for chills, fatigue, fever and unexpected weight change.   HENT:  Negative for hearing loss and trouble swallowing.    Eyes:  Negative for photophobia and visual disturbance.   Respiratory:  Negative for cough, shortness of breath and wheezing.    Cardiovascular:  Negative for chest pain, palpitations and leg swelling.   Gastrointestinal:  Negative for abdominal pain and nausea.   Genitourinary:  Negative for dysuria and frequency.   Musculoskeletal:  Positive for gait problem. Negative for arthralgias, back pain, joint swelling and " myalgias.   Skin:  Positive for wound. Negative for rash.   Neurological:  Positive for numbness. Negative for tremors, seizures, weakness and headaches.   Hematological:  Does not bruise/bleed easily.         Objective:        Physical Exam:   Foot Exam  Physical Exam  Ortho/SPM Exam     Imaging:            Assessment:       1. Ulcer of left foot with necrosis of muscle    2. Ulcer of left foot with fat layer exposed    3. Peripheral vascular disease    4. Wound infection    5. At high risk for inadequate nutritional intake    6. Type 2 diabetes mellitus with chronic kidney disease on chronic dialysis, with long-term current use of insulin    7. 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 Difficulty in walking, not elsewhere classified    9. Bilateral lower extremity edema    10. Diabetic foot infection    11. Type 2 diabetes mellitus with hypoglycemia and coma, with long-term current use of insulin    12. Type 2 diabetes mellitus with diabetic neuropathy, unspecified whether long term insulin use    13. Tinea pedis of both feet    14. Onychogryphosis    15. Decreased pedal pulses    16. ESRD (end stage renal disease)    17. Hypertension associated with diabetes    18. Diabetic ulcer of left midfoot associated with type 2 diabetes mellitus, with necrosis of muscle    19. Cellulitis and abscess of foot    20. At risk for readmission to hospital      Plan:   Ulcer of left foot with necrosis of muscle    Ulcer of left foot with fat layer exposed    Peripheral vascular disease    Wound infection    At high risk for inadequate nutritional intake    Type 2 diabetes mellitus with chronic kidney disease on chronic dialysis, with long-term current use of insulin    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    Difficulty in walking, not elsewhere classified    Bilateral lower extremity edema    Diabetic foot infection    Type 2 diabetes mellitus with hypoglycemia and coma, with long-term current  use of insulin    Type 2 diabetes mellitus with diabetic neuropathy, unspecified whether long term insulin use    Tinea pedis of both feet    Onychogryphosis    Decreased pedal pulses    ESRD (end stage renal disease)    Hypertension associated with diabetes    Diabetic ulcer of left midfoot associated with type 2 diabetes mellitus, with necrosis of muscle    Cellulitis and abscess of foot    At risk for readmission to hospital      Follow up in about 1 week (around 2/19/2024).    Procedures          Counseling:     I provided patient education verbally regarding:   Patient diagnosis, treatment options, as well as alternatives, risks, and benefits.     This note was created using Dragon voice recognition software that occasionally misinterpreted phrases or words.

## 2024-02-12 NOTE — TELEPHONE ENCOUNTER
TRIED CALLING PATIENT TO RESCHEDULE 2/14 APPOINTMENT BUT THERE WAS NO ANSWER AND NO WAY TO LEAVE VOICEMAIL

## 2024-02-13 ENCOUNTER — HOSPITAL ENCOUNTER (EMERGENCY)
Facility: HOSPITAL | Age: 59
Discharge: HOME | End: 2024-02-13
Attending: EMERGENCY MEDICINE
Payer: MEDICARE

## 2024-02-13 VITALS
WEIGHT: 172.38 LBS | RESPIRATION RATE: 16 BRPM | SYSTOLIC BLOOD PRESSURE: 150 MMHG | HEART RATE: 80 BPM | BODY MASS INDEX: 27 KG/M2 | OXYGEN SATURATION: 100 % | DIASTOLIC BLOOD PRESSURE: 79 MMHG | TEMPERATURE: 97 F

## 2024-02-13 DIAGNOSIS — U07.1 COVID-19: Primary | ICD-10-CM

## 2024-02-13 PROCEDURE — 99282 EMERGENCY DEPT VISIT SF MDM: CPT

## 2024-02-13 RX ORDER — GUAIFENESIN 100 MG/5ML
200 SOLUTION ORAL 3 TIMES DAILY PRN
Qty: 236 ML | Refills: 0 | Status: SHIPPED | OUTPATIENT
Start: 2024-02-13 | End: 2024-02-23

## 2024-02-13 RX ORDER — ONDANSETRON 4 MG/1
4 TABLET, ORALLY DISINTEGRATING ORAL EVERY 8 HOURS PRN
Qty: 20 TABLET | Refills: 0 | Status: SHIPPED | OUTPATIENT
Start: 2024-02-13

## 2024-02-13 NOTE — FIRST PROVIDER EVALUATION
Emergency Department TeleTriage Encounter Note      CHIEF COMPLAINT    Chief Complaint   Patient presents with    Tested postive for covid     Tested positive for covid, was running a slight fever.         VITAL SIGNS   Initial Vitals [02/13/24 1412]   BP Pulse Resp Temp SpO2   (!) 146/76 78 17 98.4 °F (36.9 °C) 99 %      MAP       --            ALLERGIES    Review of patient's allergies indicates:   Allergen Reactions    Dilaudid [hydromorphone] Nausea And Vomiting    Chlorhexidine Itching    Lortab [hydrocodone-acetaminophen] Itching       PROVIDER TRIAGE NOTE  This is a teletriage evaluation of a 58 y.o. female presenting to the ED complaining of COVID. Patient tested positive for COVID at home yesterday. Was around a family member over the weekend that ended up having COVID. She denies chest pain or shortness of breath. Patient has multiple comorbidities.    Patient is alert and oriented. She speaks in complete sentences. She is sitting upright in the chair in no distress.     Initial orders will be placed and care will be transferred to an alternate provider when patient is roomed for a full evaluation. Any additional orders and the final disposition will be determined by that provider.         ORDERS  Labs Reviewed - No data to display    ED Orders (720h ago, onward)      None              Virtual Visit Note: The provider triage portion of this emergency department evaluation and documentation was performed via [employer], a HIPAA-compliant telemedicine application, in concert with a tele-presenter in the room. A face to face patient evaluation with one of my colleagues will occur once the patient is placed in an emergency department room.      DISCLAIMER: This note was prepared with [employer] voice recognition transcription software. Garbled syntax, mangled pronouns, and other bizarre constructions may be attributed to that software system.

## 2024-02-13 NOTE — ED PROVIDER NOTES
Encounter Date: 2/13/2024       History     Chief Complaint   Patient presents with    Tested postive for covid     Tested positive for covid, was running a slight fever.       Patient is a 58 y.o. female presenting to the ED complaining of COVID. Patient tested positive for COVID at home yesterday. Was around a family member over the weekend that ended up having COVID. She denies chest pain or shortness of breath.  She has past medical history significant for end-stage renal disease, diabetes, CAD, hypertension, hyperlipidemia and CVA.  She reports 1 episode of nausea.  Positive home COVID test yesterday.  Denies diarrhea, shortness of breath or chest pain.  Reports mild congestion and cough.    The history is provided by the patient and a relative.     Review of patient's allergies indicates:   Allergen Reactions    Dilaudid [hydromorphone] Nausea And Vomiting    Chlorhexidine Itching    Lortab [hydrocodone-acetaminophen] Itching     Past Medical History:   Diagnosis Date    A-fib     resolved per patient    Anemia due to end stage renal disease 6/30/2022    Arthritis     Bronchitis     Cardiovascular event risk, ASCVD 10-year risk 6.7% 10/15/2016    Diabetes mellitus type II     Diabetic foot infection     Diabetic ulcer of left midfoot 05/05/2022    Disorder of kidney and ureter     Encounter for blood transfusion     ESRD (end stage renal disease) 05/18/2018    MANJINDER (generalized anxiety disorder) 10/25/2016    History of colon polyps 11/02/2016    Hyperlipidemia     Hypertension     Stroke      Past Surgical History:   Procedure Laterality Date    ANGIOGRAPHY OF LOWER EXTREMITY Left 10/18/2022    Procedure: Angiogram Extremity Unilateral;  Surgeon: Ali Khoobehi, MD;  Location: [facility] CATH/EP LAB;  Service: Vascular;  Laterality: Left;    AV FISTULA PLACEMENT Left 8/29/2022    Procedure: CREATION, AV FISTULA;  Surgeon: Ali Khoobehi, MD;  Location: [facility] OR;  Service: Vascular;  Laterality: Left;    BONE BIOPSY Left  8/24/2022    Procedure: Biopsy-Bone;  Surgeon: Porfirio Ponce DPM;  Location: [facility] OR;  Service: Podiatry;  Laterality: Left;    COLONOSCOPY N/A 10/5/2016    Procedure: COLONOSCOPY;  Surgeon: Pillo Chanel MD;  Location: [facility] ENDO;  Service: Endoscopy;  Laterality: N/A;    COLONOSCOPY N/A 4/26/2022    Procedure: COLONOSCOPY;  Surgeon: Saravanan Rachel MD;  Location: [facility] ENDO;  Service: Endoscopy;  Laterality: N/A;    FISTULOGRAM Left 8/24/2022    Procedure: Fistulogram;  Surgeon: Ali Khoobehi, MD;  Location: [facility] CATH/EP LAB;  Service: Vascular;  Laterality: Left;    FISTULOGRAM Left 1/31/2024    Procedure: Fistulogram;  Surgeon: Khoobehi, Ali, MD;  Location: [facility] CATH/EP LAB;  Service: Vascular;  Laterality: Left;    HERNIA REPAIR Bilateral 11/22/2016    inguinal    HYSTERECTOMY      INCISION AND DRAINAGE FOOT Left 5/13/2022    Procedure: INCISION AND DRAINAGE, FOOT;  Surgeon: Ricardo Bruno DPM;  Location: [facility] OR;  Service: Podiatry;  Laterality: Left;    OOPHORECTOMY      PERCUTANEOUS TRANSLUMINAL ANGIOPLASTY OF ARTERIOVENOUS FISTULA Left 1/31/2024    Procedure: PTA, AV FISTULA;  Surgeon: Khoobehi, Ali, MD;  Location: [facility] CATH/EP LAB;  Service: Vascular;  Laterality: Left;    THROMBECTOMY, AV FISTULA, UPPER EXTREMITY, PERCUTANEOUS  8/24/2022    Procedure: THROMBECTOMY, AV FISTULA, UPPER EXTREMITY, PERCUTANEOUS;  Surgeon: Ali Khoobehi, MD;  Location: [facility] CATH/EP LAB;  Service: Vascular;;    TOE AMPUTATION Left 8/26/2022    Procedure: AMPUTATION, TOE;  Surgeon: Porfirio Ponce DPM;  Location: [facility] OR;  Service: Podiatry;  Laterality: Left;     Family History   Problem Relation Age of Onset    Hyperlipidemia Mother     Hypertension Mother     Hypertension Father     Diabetes Father     Diabetes Sister     Diabetes Sister     Diabetes Maternal Aunt     Diabetes Maternal Grandmother     Heart disease Maternal Grandmother     Cancer Paternal Grandmother     Breast cancer Neg Hx     Colon cancer Neg Hx      Ovarian cancer Neg Hx      Social History     Tobacco Use    Smoking status: Never    Smokeless tobacco: Never   Substance Use Topics    Alcohol use: No    Drug use: No     Review of Systems   Constitutional:  Negative for activity change, appetite change, fatigue and fever.   HENT:  Positive for congestion. Negative for rhinorrhea and sore throat.    Eyes:  Negative for visual disturbance.   Respiratory:  Positive for cough. Negative for chest tightness, shortness of breath and wheezing.    Cardiovascular:  Negative for chest pain and palpitations.   Gastrointestinal:  Positive for nausea. Negative for diarrhea and vomiting.   Genitourinary:  Negative for flank pain.   Musculoskeletal:  Negative for arthralgias, gait problem and myalgias.   Skin:  Negative for rash.   Neurological:  Negative for weakness, light-headedness, numbness and headaches.   Psychiatric/Behavioral:  Negative for confusion.        Physical Exam     Initial Vitals [02/13/24 1412]   BP Pulse Resp Temp SpO2   (!) 146/76 78 17 98.4 °F (36.9 °C) 99 %      MAP       --         Physical Exam    Nursing note and vitals reviewed.  Constitutional: Vital signs are normal. She appears well-developed and well-nourished. She is cooperative.  Non-toxic appearance. She does not have a sickly appearance.   HENT:   Head: Normocephalic and atraumatic.   Right Ear: Tympanic membrane and external ear normal.   Left Ear: Tympanic membrane and external ear normal.   Nose: Nose normal.   Mouth/Throat: Uvula is midline and oropharynx is clear and moist.   Eyes: Conjunctivae and lids are normal.   Neck: Neck supple.   Normal range of motion.   Full passive range of motion without pain.     Cardiovascular:  Normal rate, regular rhythm and normal heart sounds.     Exam reveals no gallop and no friction rub.       No murmur heard.  Pulmonary/Chest: Breath sounds normal. She has no wheezes. She has no rhonchi. She has no rales.   Musculoskeletal:      Cervical back:  Full passive range of motion without pain, normal range of motion and neck supple.     Neurological: She is alert.   Skin: Skin is warm, dry and intact. No rash noted.         ED Course   Procedures  Labs Reviewed - No data to display       Imaging Results    None          Medications - No data to display  Medical Decision Making  Urgent evaluation of a well appearing 58-year-old female who presents with cough and congestion.  Reports positive home COVID test.  Presents for prescription for Paxlovid. Vital signs are stable. Clear and equal breath sounds bilaterally. Oxygen saturation is stable. I doubt pneumonia. No sign of otitis media. Denied GI complaints.  Patient has multiple home medications which are contraindicated with the use of Paxlovid.  Symptomatic treatment at home. Discussed results with patient. Return precautions given. Based on my clinical evaluation, I do not appreciate any immediate, emergent, or life threatening condition or etiology that warrants additional workup today and feel that the patient can be discharged with close follow up care.  Patient is to follow up with their primary care provider. All questions answered.        Amount and/or Complexity of Data Reviewed  Independent Historian: friend  External Data Reviewed: labs, radiology and notes.  Labs: ordered.    Risk  OTC drugs.  Prescription drug management.                                      Clinical Impression:  Final diagnoses:  [U07.1] COVID-19 (Primary)          ED Disposition Condition    Discharge Stable          ED Prescriptions       Medication Sig Dispense Start Date End Date Auth. Provider    ondansetron (ZOFRAN-ODT) 4 MG TbDL Take 1 tablet (4 mg total) by mouth every 8 (eight) hours as needed (nausea/vomiting). 20 tablet 2/13/2024 -- Margaux Hines PA-C    guaiFENesin 100 mg/5 ml (ROBITUSSIN) 100 mg/5 mL syrup Take 10 mLs (200 mg total) by mouth 3 (three) times daily as needed for Congestion or Cough. 236 mL  2/13/2024 2/23/2024 Margaux Hines PA-C          Follow-up Information       Follow up With Specialties Details Why Contact Info Additional Information    Stefano Lopez MD Family Medicine   [address] [place] [id number]  [phone]       [facility] Emergency Medicine  As needed [address] [place] [address] [address] [place] [id number] 1st floor             Margaux Hines PA-C  02/13/24 171

## 2024-02-15 ENCOUNTER — PATIENT OUTREACH (OUTPATIENT)
Dept: EMERGENCY MEDICINE | Facility: HOSPITAL | Age: 59
End: 2024-02-15

## 2024-02-15 NOTE — PROGRESS NOTES
Patient was seen in the ED on 1/27/24. I made 2 attempts to contact pt to assist with Post ED Discharge 7-day follow up Navigation and I was unable to reach pt. Pt was contacted by pcp and scheduled a fu appt on 2/14/24 @ 11:00 a.m. and pt rescheduled appt to 3/5/24. No further attempts scheduled. Closing encounter.     Lis Cunningham

## 2024-02-20 ENCOUNTER — OFFICE VISIT (OUTPATIENT)
Dept: WOUND CARE | Facility: HOSPITAL | Age: 59
End: 2024-02-20
Attending: FAMILY MEDICINE
Payer: MEDICARE

## 2024-02-20 VITALS
SYSTOLIC BLOOD PRESSURE: 180 MMHG | HEART RATE: 92 BPM | RESPIRATION RATE: 20 BRPM | TEMPERATURE: 98 F | DIASTOLIC BLOOD PRESSURE: 86 MMHG

## 2024-02-20 DIAGNOSIS — I73.9 PERIPHERAL VASCULAR DISEASE: ICD-10-CM

## 2024-02-20 DIAGNOSIS — E11.649 TYPE 2 DIABETES MELLITUS WITH HYPOGLYCEMIA UNAWARENESS: ICD-10-CM

## 2024-02-20 DIAGNOSIS — L97.523 ULCER OF LEFT FOOT WITH NECROSIS OF MUSCLE: Primary | ICD-10-CM

## 2024-02-20 PROCEDURE — 11042 DBRDMT SUBQ TIS 1ST 20SQCM/<: CPT | Mod: HCNC,PN | Performed by: FAMILY MEDICINE

## 2024-02-20 PROCEDURE — 99499 UNLISTED E&M SERVICE: CPT | Mod: HCNC,,, | Performed by: FAMILY MEDICINE

## 2024-02-20 PROCEDURE — 11042 DBRDMT SUBQ TIS 1ST 20SQCM/<: CPT | Mod: HCNC,,, | Performed by: FAMILY MEDICINE

## 2024-02-20 NOTE — PROGRESS NOTES
Wound Care Progress Note    Subjective:       Patient ID: Leanna García is a 58 y.o. female.    Chief Complaint: Wound Care    HPI  Pt seen in clinic for a FU visit for a DM ulcer of the left foot. Wound is improved from last visit. Pt has no new complaints today.  Review of Systems   Skin:  Positive for wound.        Left foot DM ulcer   All other systems reviewed and are negative.      Objective:        Physical Exam  Vitals and nursing note reviewed.   Constitutional:       General: She is not in acute distress.     Appearance: Normal appearance.   Skin:     General: Skin is warm and dry.      Findings: Lesion present.      Comments: Left foot DM ulcer, improved, see wound care assessment documentation in  chart review scanned under the media tab   Neurological:      General: No focal deficit present.      Mental Status: She is alert.   Psychiatric:         Mood and Affect: Mood normal.         Thought Content: Thought content normal.         Judgment: Judgment normal.       Vitals:    02/20/24 1033   BP: (!) 180/86   Pulse: 92   Resp: 20   Temp: 98.3 °F (36.8 °C)       Assessment:           ICD-10-CM ICD-9-CM   1. Ulcer of left foot with necrosis of muscle  L97.523 707.15     728.89   2. 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  E11.649 250.80   3. Peripheral vascular disease  I73.9 443.9                Plan:                  Leanna was seen today for wound care.    Diagnoses and all orders for this visit:    Ulcer of left foot with necrosis of muscle    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    Peripheral vascular disease      Please see wound care instructions which have been provided separately

## 2024-02-27 ENCOUNTER — OFFICE VISIT (OUTPATIENT)
Dept: WOUND CARE | Facility: HOSPITAL | Age: 59
End: 2024-02-27
Attending: FAMILY MEDICINE
Payer: MEDICARE

## 2024-02-27 VITALS
SYSTOLIC BLOOD PRESSURE: 146 MMHG | DIASTOLIC BLOOD PRESSURE: 89 MMHG | TEMPERATURE: 98 F | HEART RATE: 83 BPM | RESPIRATION RATE: 16 BRPM

## 2024-02-27 DIAGNOSIS — I73.9 PERIPHERAL VASCULAR DISEASE: ICD-10-CM

## 2024-02-27 DIAGNOSIS — T14.8XXA WOUND INFECTION: ICD-10-CM

## 2024-02-27 DIAGNOSIS — E11.649 TYPE 2 DIABETES MELLITUS WITH HYPOGLYCEMIA UNAWARENESS: ICD-10-CM

## 2024-02-27 DIAGNOSIS — L08.9 WOUND INFECTION: ICD-10-CM

## 2024-02-27 DIAGNOSIS — L97.522 ULCER OF LEFT FOOT WITH FAT LAYER EXPOSED: ICD-10-CM

## 2024-02-27 DIAGNOSIS — L97.523 ULCER OF LEFT FOOT WITH NECROSIS OF MUSCLE: Primary | ICD-10-CM

## 2024-02-27 PROCEDURE — 3066F NEPHROPATHY DOC TX: CPT | Mod: HCNC,CPTII,, | Performed by: FAMILY MEDICINE

## 2024-02-27 PROCEDURE — 3044F HG A1C LEVEL LT 7.0%: CPT | Mod: HCNC,CPTII,, | Performed by: FAMILY MEDICINE

## 2024-02-27 PROCEDURE — 11042 DBRDMT SUBQ TIS 1ST 20SQCM/<: CPT | Mod: HCNC,PN | Performed by: FAMILY MEDICINE

## 2024-02-27 PROCEDURE — 1111F DSCHRG MED/CURRENT MED MERGE: CPT | Mod: HCNC,CPTII,, | Performed by: FAMILY MEDICINE

## 2024-02-27 PROCEDURE — 11042 DBRDMT SUBQ TIS 1ST 20SQCM/<: CPT | Mod: HCNC,,, | Performed by: FAMILY MEDICINE

## 2024-02-27 PROCEDURE — 99213 OFFICE O/P EST LOW 20 MIN: CPT | Mod: HCNC,25,, | Performed by: FAMILY MEDICINE

## 2024-02-27 RX ORDER — DOXYCYCLINE 100 MG/1
100 CAPSULE ORAL 2 TIMES DAILY
Qty: 20 CAPSULE | Refills: 0 | Status: SHIPPED | OUTPATIENT
Start: 2024-02-27 | End: 2024-03-08

## 2024-02-27 NOTE — PROGRESS NOTES
Wound Care Progress Note    Subjective:       Patient ID: Leanna García is a 58 y.o. female.    Chief Complaint: No chief complaint on file.    HPI  Pt seen in clinic for a FU visit for a left foot DM ulcer. Wound is stable, some mild erythema, pt would like to switch to powdered collagen as she believes it performs better. No other complaints today  Review of Systems   Skin:  Positive for wound.        DM ulcer left foot   All other systems reviewed and are negative.      Objective:        Physical Exam  Vitals and nursing note reviewed.   Constitutional:       General: She is not in acute distress.     Appearance: Normal appearance.   Skin:     General: Skin is warm and dry.      Findings: Lesion present.      Comments: DM ulcer of the left foot, see wound care assessment documentation in  chart review scanned under the media tab   Neurological:      General: No focal deficit present.      Mental Status: She is alert and oriented to person, place, and time.   Psychiatric:         Mood and Affect: Mood normal.         Thought Content: Thought content normal.         Judgment: Judgment normal.       There were no vitals filed for this visit.    Assessment:           ICD-10-CM ICD-9-CM   1. Ulcer of left foot with necrosis of muscle  L97.523 707.15     728.89   2. 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  E11.649 250.80   3. Peripheral vascular disease  I73.9 443.9   4. Ulcer of left foot with fat layer exposed  L97.522 707.15   5. Wound infection  T14.8XXA 958.3    L08.9                 Plan:                  Diagnoses and all orders for this visit:    Ulcer of left foot with necrosis of muscle    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    Peripheral vascular disease    Ulcer of left foot with fat layer exposed    Wound infection    Other orders  -     doxycycline (VIBRAMYCIN) 100 MG Cap; Take 1 capsule (100 mg total) by mouth 2 (two) times daily. for 10 days        See  wound care instructions which have been provided separately.

## 2024-03-05 ENCOUNTER — OFFICE VISIT (OUTPATIENT)
Dept: WOUND CARE | Facility: HOSPITAL | Age: 59
End: 2024-03-05
Attending: FAMILY MEDICINE
Payer: MEDICARE

## 2024-03-05 VITALS
HEART RATE: 80 BPM | SYSTOLIC BLOOD PRESSURE: 144 MMHG | DIASTOLIC BLOOD PRESSURE: 86 MMHG | TEMPERATURE: 98 F | RESPIRATION RATE: 16 BRPM

## 2024-03-05 DIAGNOSIS — E11.649 TYPE 2 DIABETES MELLITUS WITH HYPOGLYCEMIA UNAWARENESS: ICD-10-CM

## 2024-03-05 DIAGNOSIS — L97.523 ULCER OF LEFT FOOT WITH NECROSIS OF MUSCLE: Primary | ICD-10-CM

## 2024-03-05 DIAGNOSIS — I73.9 PERIPHERAL VASCULAR DISEASE: ICD-10-CM

## 2024-03-05 PROCEDURE — 11042 DBRDMT SUBQ TIS 1ST 20SQCM/<: CPT | Mod: HCNC,,, | Performed by: FAMILY MEDICINE

## 2024-03-05 PROCEDURE — 99499 UNLISTED E&M SERVICE: CPT | Mod: HCNC,,, | Performed by: FAMILY MEDICINE

## 2024-03-05 PROCEDURE — 11042 DBRDMT SUBQ TIS 1ST 20SQCM/<: CPT | Mod: HCNC,PN | Performed by: FAMILY MEDICINE

## 2024-03-05 NOTE — PROGRESS NOTES
Wound Care Progress Note    Subjective:       Patient ID: Leanna García is a 58 y.o. female.    Chief Complaint: Wound Care and Wound Consult    HPI  Pt seen in clinic for a FU visit for a DM foot ulcer of the left foot. Wound is doing well, no new complaints today, no s/s of infection  Review of Systems   Skin:  Positive for wound.        Open wound left foot   All other systems reviewed and are negative.      Objective:        Physical Exam  Vitals and nursing note reviewed.   Constitutional:       Appearance: Normal appearance. She is not toxic-appearing.   Skin:     General: Skin is warm and dry.      Comments: Left foot DM ulcer, see wound care assessment documentation in chart review scanned under the media tab   Neurological:      General: No focal deficit present.      Mental Status: She is alert and oriented to person, place, and time.   Psychiatric:         Mood and Affect: Mood normal.         Thought Content: Thought content normal.         Judgment: Judgment normal.       Vitals:    03/05/24 1427   BP: (!) 144/86   Pulse: 80   Resp: 16   Temp: 98.2 °F (36.8 °C)       Assessment:           ICD-10-CM ICD-9-CM   1. Ulcer of left foot with necrosis of muscle  L97.523 707.15     728.89   2. 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  E11.649 250.80   3. Peripheral vascular disease  I73.9 443.9                Plan:                  Leanna was seen today for wound care and wound consult.    Diagnoses and all orders for this visit:    Ulcer of left foot with necrosis of muscle    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    Peripheral vascular disease        Much of the documentation for this visit was completed in wound docs system. Please see the attached documentation for further details about the patients care. Scanned under the media tab.

## 2024-03-08 ENCOUNTER — PATIENT MESSAGE (OUTPATIENT)
Dept: ADMINISTRATIVE | Facility: HOSPITAL | Age: 59
End: 2024-03-08
Payer: MEDICARE

## 2024-03-08 ENCOUNTER — PATIENT OUTREACH (OUTPATIENT)
Dept: ADMINISTRATIVE | Facility: HOSPITAL | Age: 59
End: 2024-03-08
Payer: MEDICARE

## 2024-03-08 NOTE — PROGRESS NOTES
Population Health Chart Review & Patient Outreach Details      Additional [facility] Health Notes:      BREAST CANCER SCREENING    Non-compliant report chart audits for BREAST CANCER SCREENING     Outreach to patient in reference to SCHEDULING A MAMMOGRAM EXAM.            Updates Requested / Reviewed:        Health Maintenance Topics Overdue:      VBHM Score: 3     Eye Exam  Mammogram  Uncontrolled BP                       Health Maintenance Topic(s) Outreach Outcomes & Actions Taken:    Breast Cancer Screening - Outreach Outcomes & Actions Taken  : msg

## 2024-03-08 NOTE — LETTER
March 20, 2024    Leanna García  [address]  [place] [address]             [facility]  [address]  [facility] [address]  Phone: [phone] Dear Leanna,     Our records indicate you may be overdue for your mammogram screening.  Mammography screening can help find breast cancer at an early stage, when it is most likely to be successfully treated.    Stefano oLpez MD has entered your screening mammogram order.  You can schedule this Mammogram exam through the link in your [facility] portal, provided on the Appointment Center home page.  Should you need assistance with scheduling, you can call the main line at [phone]. Our scheduling specialists will be able to help you coordinate your appointment.    If you recently had your mammogram screening outside of [facility], please let your primary care team know so that they can update your health record.         Sincerely,        Your Women's Health Care Team

## 2024-03-12 ENCOUNTER — OFFICE VISIT (OUTPATIENT)
Dept: WOUND CARE | Facility: HOSPITAL | Age: 59
End: 2024-03-12
Attending: FAMILY MEDICINE
Payer: MEDICARE

## 2024-03-12 VITALS
HEART RATE: 76 BPM | TEMPERATURE: 98 F | SYSTOLIC BLOOD PRESSURE: 193 MMHG | RESPIRATION RATE: 16 BRPM | DIASTOLIC BLOOD PRESSURE: 89 MMHG

## 2024-03-12 DIAGNOSIS — L97.522 ULCER OF LEFT FOOT WITH FAT LAYER EXPOSED: ICD-10-CM

## 2024-03-12 DIAGNOSIS — R26.2 DIFFICULTY IN WALKING, NOT ELSEWHERE CLASSIFIED: ICD-10-CM

## 2024-03-12 DIAGNOSIS — E11.649 TYPE 2 DIABETES MELLITUS WITH HYPOGLYCEMIA UNAWARENESS: ICD-10-CM

## 2024-03-12 DIAGNOSIS — L97.523 ULCER OF LEFT FOOT WITH NECROSIS OF MUSCLE: Primary | ICD-10-CM

## 2024-03-12 DIAGNOSIS — I73.9 PERIPHERAL VASCULAR DISEASE: ICD-10-CM

## 2024-03-12 DIAGNOSIS — Z91.89 AT HIGH RISK FOR INADEQUATE NUTRITIONAL INTAKE: ICD-10-CM

## 2024-03-12 PROCEDURE — 99499 UNLISTED E&M SERVICE: CPT | Mod: HCNC,,, | Performed by: FAMILY MEDICINE

## 2024-03-12 PROCEDURE — 11042 DBRDMT SUBQ TIS 1ST 20SQCM/<: CPT | Mod: HCNC,,, | Performed by: FAMILY MEDICINE

## 2024-03-12 PROCEDURE — 11042 DBRDMT SUBQ TIS 1ST 20SQCM/<: CPT | Mod: HCNC,PN | Performed by: FAMILY MEDICINE

## 2024-03-12 NOTE — PROGRESS NOTES
Wound Care Progress Note    Subjective:       Patient ID: Leanna García is a 58 y.o. female.    Chief Complaint: Wound Care and Wound Consult    HPI  Pt seen in clinic for a FU visit for a left foot DM ulcer. Wound continues to progress, pt doing well. No neww complaints today  Review of Systems   Skin:  Positive for wound.        Open wound left foot   All other systems reviewed and are negative.      Objective:        Physical Exam  Vitals and nursing note reviewed.   Constitutional:       Appearance: Normal appearance.   Skin:     General: Skin is warm and dry.      Findings: Lesion present.      Comments: Left foot DM ulcer, see wound care assessment documentation in chart review scanned under the media tab   Neurological:      General: No focal deficit present.      Mental Status: She is alert and oriented to person, place, and time.   Psychiatric:         Mood and Affect: Mood normal.         Thought Content: Thought content normal.         Judgment: Judgment normal.       Vitals:    03/12/24 1049   BP: (!) 193/89   Pulse: 76   Resp: 16   Temp: 98.3 °F (36.8 °C)       Assessment:           ICD-10-CM ICD-9-CM   1. Ulcer of left foot with necrosis of muscle  L97.523 707.15     728.89   2. Ulcer of left foot with fat layer exposed  L97.522 707.15   3. 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  E11.649 250.80   4. Peripheral vascular disease  I73.9 443.9   5. At high risk for inadequate nutritional intake  Z91.89 V15.89   6. Difficulty in walking, not elsewhere classified  R26.2 719.7                Plan:                  Leanna was seen today for wound care and wound consult.    Diagnoses and all orders for this visit:    Ulcer of left foot with necrosis of muscle    Ulcer of left foot with fat layer exposed    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    Peripheral vascular disease    At high risk for inadequate nutritional intake    Difficulty in walking, not elsewhere  classified      See wound care instructions which have been provided separately.

## 2024-03-19 ENCOUNTER — OFFICE VISIT (OUTPATIENT)
Dept: WOUND CARE | Facility: HOSPITAL | Age: 59
End: 2024-03-19
Attending: FAMILY MEDICINE
Payer: MEDICARE

## 2024-03-19 VITALS
TEMPERATURE: 97 F | RESPIRATION RATE: 18 BRPM | DIASTOLIC BLOOD PRESSURE: 75 MMHG | SYSTOLIC BLOOD PRESSURE: 178 MMHG | HEART RATE: 65 BPM

## 2024-03-19 DIAGNOSIS — L03.119 CELLULITIS AND ABSCESS OF FOOT: ICD-10-CM

## 2024-03-19 DIAGNOSIS — L02.619 CELLULITIS AND ABSCESS OF FOOT: ICD-10-CM

## 2024-03-19 DIAGNOSIS — Z79.4 TYPE 2 DIABETES MELLITUS WITH HYPOGLYCEMIA AND COMA, WITH LONG-TERM CURRENT USE OF INSULIN: ICD-10-CM

## 2024-03-19 DIAGNOSIS — L97.522 ULCER OF LEFT FOOT WITH FAT LAYER EXPOSED: ICD-10-CM

## 2024-03-19 DIAGNOSIS — R60.0 BILATERAL LOWER EXTREMITY EDEMA: ICD-10-CM

## 2024-03-19 DIAGNOSIS — E11.641 TYPE 2 DIABETES MELLITUS WITH HYPOGLYCEMIA AND COMA, WITH LONG-TERM CURRENT USE OF INSULIN: ICD-10-CM

## 2024-03-19 DIAGNOSIS — I73.9 PERIPHERAL VASCULAR DISEASE: ICD-10-CM

## 2024-03-19 DIAGNOSIS — L97.523 ULCER OF LEFT FOOT WITH NECROSIS OF MUSCLE: Primary | ICD-10-CM

## 2024-03-19 DIAGNOSIS — E11.649 TYPE 2 DIABETES MELLITUS WITH HYPOGLYCEMIA UNAWARENESS: ICD-10-CM

## 2024-03-19 PROCEDURE — 99499 UNLISTED E&M SERVICE: CPT | Mod: HCNC,,, | Performed by: FAMILY MEDICINE

## 2024-03-19 PROCEDURE — 11042 DBRDMT SUBQ TIS 1ST 20SQCM/<: CPT | Mod: HCNC,,, | Performed by: FAMILY MEDICINE

## 2024-03-19 PROCEDURE — 11042 DBRDMT SUBQ TIS 1ST 20SQCM/<: CPT | Mod: HCNC,PN

## 2024-03-19 RX ORDER — DOXYCYCLINE 100 MG/1
100 CAPSULE ORAL 2 TIMES DAILY
Qty: 20 CAPSULE | Refills: 0 | Status: SHIPPED | OUTPATIENT
Start: 2024-03-19 | End: 2024-03-29

## 2024-03-19 NOTE — PROGRESS NOTES
Wound Care Progress Note    Subjective:       Patient ID: Leanna García is a 58 y.o. female.    Chief Complaint: Wound Care    HPI  Pt seen in clinic for a FU visit for a left foot DM ulcer. Wound is smaller today. Pt has no new complaints today  Review of Systems   Skin:  Positive for wound.        Open wound left foot   All other systems reviewed and are negative.        Objective:        Physical Exam  Vitals and nursing note reviewed.   Constitutional:       General: She is not in acute distress.     Appearance: Normal appearance.   Skin:     General: Skin is warm and dry.      Findings: Lesion present.      Comments: Left foot DM ulcer, see wound care assessment documentation in chart review scanned under the media tab   Neurological:      General: No focal deficit present.      Mental Status: She is alert and oriented to person, place, and time.   Psychiatric:         Mood and Affect: Mood normal.         Thought Content: Thought content normal.         Judgment: Judgment normal.         Vitals:    03/19/24 0839   BP: (!) 178/75   Pulse: 65   Resp: 18   Temp: 97 °F (36.1 °C)       Assessment:           ICD-10-CM ICD-9-CM   1. Ulcer of left foot with necrosis of muscle  L97.523 707.15     728.89   2. Ulcer of left foot with fat layer exposed  L97.522 707.15   3. 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  E11.649 250.80   4. Peripheral vascular disease  I73.9 443.9   5. Bilateral lower extremity edema  R60.0 782.3   6. Type 2 diabetes mellitus with hypoglycemia and coma, with long-term current use of insulin  E11.641 250.30    Z79.4 V58.67   7. Cellulitis and abscess of foot  L03.119 682.7    L02.619                 Plan:                  Leanna was seen today for wound care.    Diagnoses and all orders for this visit:    Ulcer of left foot with necrosis of muscle    Ulcer of left foot with fat layer exposed    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    Peripheral  vascular disease    Bilateral lower extremity edema    Type 2 diabetes mellitus with hypoglycemia and coma, with long-term current use of insulin    Cellulitis and abscess of foot    Other orders  -     doxycycline (VIBRAMYCIN) 100 MG Cap; Take 1 capsule (100 mg total) by mouth 2 (two) times daily. for 10 days      Please see wound care instructions which have been provided separately.

## 2024-03-21 ENCOUNTER — HOSPITAL ENCOUNTER (OUTPATIENT)
Dept: RADIOLOGY | Facility: HOSPITAL | Age: 59
Discharge: HOME | End: 2024-03-21
Attending: NURSE PRACTITIONER
Payer: MEDICARE

## 2024-03-21 ENCOUNTER — OFFICE VISIT (OUTPATIENT)
Dept: CARDIOLOGY | Facility: CLINIC | Age: 59
End: 2024-03-21
Payer: MEDICARE

## 2024-03-21 VITALS
OXYGEN SATURATION: 99 % | HEIGHT: 67 IN | BODY MASS INDEX: 26.99 KG/M2 | WEIGHT: 171.94 LBS | SYSTOLIC BLOOD PRESSURE: 157 MMHG | DIASTOLIC BLOOD PRESSURE: 62 MMHG | HEART RATE: 47 BPM

## 2024-03-21 DIAGNOSIS — J06.9 UPPER RESPIRATORY TRACT INFECTION, UNSPECIFIED TYPE: Primary | ICD-10-CM

## 2024-03-21 DIAGNOSIS — R05.9 COUGH, UNSPECIFIED TYPE: ICD-10-CM

## 2024-03-21 DIAGNOSIS — E11.59 HYPERTENSION ASSOCIATED WITH DIABETES: Chronic | ICD-10-CM

## 2024-03-21 DIAGNOSIS — I15.2 HYPERTENSION ASSOCIATED WITH DIABETES: Chronic | ICD-10-CM

## 2024-03-21 DIAGNOSIS — E11.52 DIABETIC WET GANGRENE OF THE FOOT: ICD-10-CM

## 2024-03-21 DIAGNOSIS — J32.9 CHRONIC SINUSITIS, UNSPECIFIED LOCATION: ICD-10-CM

## 2024-03-21 DIAGNOSIS — I73.9 PERIPHERAL VASCULAR DISEASE: ICD-10-CM

## 2024-03-21 DIAGNOSIS — L60.8 TOENAIL DEFORMITY: ICD-10-CM

## 2024-03-21 DIAGNOSIS — I50.9 CONGESTIVE HEART FAILURE, UNSPECIFIED HF CHRONICITY, UNSPECIFIED HEART FAILURE TYPE: ICD-10-CM

## 2024-03-21 DIAGNOSIS — I48.91 ATRIAL FIBRILLATION, UNSPECIFIED TYPE: ICD-10-CM

## 2024-03-21 DIAGNOSIS — I50.32 CHRONIC DIASTOLIC HEART FAILURE: ICD-10-CM

## 2024-03-21 DIAGNOSIS — N18.6 ESRD (END STAGE RENAL DISEASE): ICD-10-CM

## 2024-03-21 PROCEDURE — 3066F NEPHROPATHY DOC TX: CPT | Mod: HCNC,CPTII,S$GLB, | Performed by: NURSE PRACTITIONER

## 2024-03-21 PROCEDURE — 3044F HG A1C LEVEL LT 7.0%: CPT | Mod: HCNC,CPTII,S$GLB, | Performed by: NURSE PRACTITIONER

## 2024-03-21 PROCEDURE — 1159F MED LIST DOCD IN RCRD: CPT | Mod: HCNC,CPTII,S$GLB, | Performed by: NURSE PRACTITIONER

## 2024-03-21 PROCEDURE — 3078F DIAST BP <80 MM HG: CPT | Mod: HCNC,CPTII,S$GLB, | Performed by: NURSE PRACTITIONER

## 2024-03-21 PROCEDURE — 1160F RVW MEDS BY RX/DR IN RCRD: CPT | Mod: HCNC,CPTII,S$GLB, | Performed by: NURSE PRACTITIONER

## 2024-03-21 PROCEDURE — 99214 OFFICE O/P EST MOD 30 MIN: CPT | Mod: HCNC,S$GLB,, | Performed by: NURSE PRACTITIONER

## 2024-03-21 PROCEDURE — 71046 X-RAY EXAM CHEST 2 VIEWS: CPT | Mod: TC

## 2024-03-21 PROCEDURE — 3077F SYST BP >= 140 MM HG: CPT | Mod: HCNC,CPTII,S$GLB, | Performed by: NURSE PRACTITIONER

## 2024-03-21 PROCEDURE — 3008F BODY MASS INDEX DOCD: CPT | Mod: HCNC,CPTII,S$GLB, | Performed by: NURSE PRACTITIONER

## 2024-03-21 PROCEDURE — 99999 PR PBB SHADOW E&M-EST. PATIENT-LVL III: CPT | Mod: PBBFAC,HCNC,, | Performed by: NURSE PRACTITIONER

## 2024-03-21 RX ORDER — GUAIFENESIN 600 MG/1
1200 TABLET, EXTENDED RELEASE ORAL 2 TIMES DAILY
Qty: 40 TABLET | Refills: 0 | Status: SHIPPED | OUTPATIENT
Start: 2024-03-21 | End: 2024-03-21

## 2024-03-21 RX ORDER — DEXTROMETHORPHAN HBR AND GUAIFENESIN 5; 100 MG/5ML; MG/5ML
5 LIQUID ORAL 2 TIMES DAILY
Qty: 120 ML | Refills: 0 | Status: SHIPPED | OUTPATIENT
Start: 2024-03-21 | End: 2024-03-31

## 2024-03-21 NOTE — ASSESSMENT & PLAN NOTE
She is on doxycycline already for foot wound   Continue zyrtec  Called in dextrometh/guaf cough syrup

## 2024-03-21 NOTE — PROGRESS NOTES
" Subjective:    Patient ID:  Leanna García is a 58 y.o. female patient here for evaluation Hospital Follow Up (co) and Chest Congestion (States cold with rough cough last two nights )    History of Present Illness:  In office w/  for hospital follow up  Has been on doxycycline for "a while" for left foot wound; asked for referral to get toenails clipped  +coughing for past 3 days; no fever, chills or lack of appetite  Had HD yesterday - BP dropped after to 90s;  gave potato chips and BP responded to normal  Pt is chronically ill, ESRD, DM, left foot wound, HTN, blood sugar this   No chest pain but endorses getting choked up with mucus related cough;   endorses orthopnea; Had HD yesterday ; recent normal echo/stress noted below    Had covid last month as below; multiple hospital visits    2/13/24 Hospital follow up   Urgent evaluation of a well appearing 58-year-old female who presents with cough and congestion. Reports positive home COVID test. Presents for prescription for Paxlovid. Vital signs are stable. Clear and equal breath sounds bilaterally. Oxygen saturation is stable. I doubt pneumonia. No sign of otitis media. Denied GI complaints. Patient has multiple home medications which are contraindicated with the use of Paxlovid. Symptomatic treatment at home. Discussed results with patient. Return precautions given. Based on my clinical evaluation, I do not appreciate any immediate, emergent, or life threatening condition or etiology that warrants additional workup today and feel that the patient can be discharged with close follow up care.  Patient is to follow up with their primary care provider. All questions answered.     1/18-1/21/24  Hospital Course:   Patient with history of ESRD on HD MWF, diabetes, hypertension, atrial fibrillation, chronic diastolic heart failure presents to the emergency room for questionable syncope.  Patient states that she became incoherent during dialysis " after 2-1/2 hours, denies loss of consciousness although initial reports say that she did lose consciousness.  Head CT was negative.  Carotid ultrasound was negative for significant stenosis.  Echocardiogram on 01/23/2024 showed normal wall motion, ejection fraction 60-65%.  Troponin elevated likely 2/2 to demand from hypotension and pt with ESRD.  Nuclear stress test on 01/24/2024 showed no convincing evidence of pharmacologically induced reversible ischemia or infarct.. Nephro and wound care consulted. Pt hyperkalemic, underwent HD on 1/30 and 1/31. Given dose of lokelma. Vascular performed angiogram on 01/31 which showed stenosis, status post balloon angioplasty.  Patient instructed to follow up with vascular surgery and Podiatry outpatient.  Discussed with Nephrology, patient cleared for discharge.  Instructed patient to hold blood pressure medication morning of dialysis.  Patient medically stable for discharge.  Patient also instructed to follow up with Cardiology outpatient.      Most Recent Echocardiogram Results  Results for orders placed during the hospital encounter of 01/22/24    Echo Saline Bubble? Yes    Interpretation Summary    Left Ventricle: The left ventricle is normal in size. Mildly increased wall thickness. There is mild concentric hypertrophy. Normal wall motion. There is normal systolic function with a visually estimated ejection fraction of 60 - 65%. There is normal diastolic function.    Right Ventricle: Normal right ventricular cavity size. Wall thickness is normal. Right ventricle wall motion  is normal. Systolic function is normal.    Aortic Valve: There is mild aortic valve sclerosis.    Mitral Valve: There is mild regurgitation with a centrally directed jet.    IVC/SVC: Normal venous pressure at 3 mmHg.    Pericardium: There is a trivial posterior effusion. No indication of cardiac tamponade.      Most Recent Nuclear Stress Test Results  Results for orders placed during the hospital  encounter of 01/22/24    Nuclear Stress Test    Interpretation Summary    The ECG portion of the study is negative for ischemia.    The patient reported no chest pain during the stress test.    There were no arrhythmias during stress.    The nuclear portion of this study will be reported separately.      Most Recent Cardiac PET Stress Test Results  No results found for this or any previous visit.      Most Recent Cardiovascular Angiogram results  Results for orders placed during the hospital encounter of 01/29/24    Cardiac catheterization    Narrative  Procedure performed in the Invasive Lab  - See Procedure Log link below for nursing documentation  - See OpNote on Surgeries Tab for physician findings  - See Imaging Tab for radiologist dictation      Other Most Recent Cardiology Results  Results for orders placed during the hospital encounter of 01/29/24    CARDIAC MONITORING STRIPS      REVIEW OF SYSTEMS: As noted in HPI       Past Medical History:   Diagnosis Date    A-fib     resolved per patient    Anemia due to end stage renal disease 6/30/2022    Arthritis     Bronchitis     Cardiovascular event risk, ASCVD 10-year risk 6.7% 10/15/2016    Diabetes mellitus type II     Diabetic foot infection     Diabetic ulcer of left midfoot 05/05/2022    Disorder of kidney and ureter     Encounter for blood transfusion     ESRD (end stage renal disease) 05/18/2018    MANJINDER (generalized anxiety disorder) 10/25/2016    History of colon polyps 11/02/2016    Hyperlipidemia     Hypertension     Stroke      Past Surgical History:   Procedure Laterality Date    ANGIOGRAPHY OF LOWER EXTREMITY Left 10/18/2022    Procedure: Angiogram Extremity Unilateral;  Surgeon: Ali Khoobehi, MD;  Location: [facility] CATH/EP LAB;  Service: Vascular;  Laterality: Left;    AV FISTULA PLACEMENT Left 8/29/2022    Procedure: CREATION, AV FISTULA;  Surgeon: Ali Khoobehi, MD;  Location: [facility] OR;  Service: Vascular;  Laterality: Left;    BONE BIOPSY Left  8/24/2022    Procedure: Biopsy-Bone;  Surgeon: Porfirio Ponce DPM;  Location: [facility] OR;  Service: Podiatry;  Laterality: Left;    COLONOSCOPY N/A 10/5/2016    Procedure: COLONOSCOPY;  Surgeon: Pillo Chanel MD;  Location: [facility] ENDO;  Service: Endoscopy;  Laterality: N/A;    COLONOSCOPY N/A 4/26/2022    Procedure: COLONOSCOPY;  Surgeon: Saravanan Rachel MD;  Location: [facility] ENDO;  Service: Endoscopy;  Laterality: N/A;    FISTULOGRAM Left 8/24/2022    Procedure: Fistulogram;  Surgeon: Ali Khoobehi, MD;  Location: [facility] CATH/EP LAB;  Service: Vascular;  Laterality: Left;    FISTULOGRAM Left 1/31/2024    Procedure: Fistulogram;  Surgeon: Khoobehi, Ali, MD;  Location: [facility] CATH/EP LAB;  Service: Vascular;  Laterality: Left;    HERNIA REPAIR Bilateral 11/22/2016    inguinal    HYSTERECTOMY      INCISION AND DRAINAGE FOOT Left 5/13/2022    Procedure: INCISION AND DRAINAGE, FOOT;  Surgeon: Ricardo Bruno DPM;  Location: [facility] OR;  Service: Podiatry;  Laterality: Left;    OOPHORECTOMY      PERCUTANEOUS TRANSLUMINAL ANGIOPLASTY OF ARTERIOVENOUS FISTULA Left 1/31/2024    Procedure: PTA, AV FISTULA;  Surgeon: Khoobehi, Ali, MD;  Location: [facility] CATH/EP LAB;  Service: Vascular;  Laterality: Left;    THROMBECTOMY, AV FISTULA, UPPER EXTREMITY, PERCUTANEOUS  8/24/2022    Procedure: THROMBECTOMY, AV FISTULA, UPPER EXTREMITY, PERCUTANEOUS;  Surgeon: Ali Khoobehi, MD;  Location: [facility] CATH/EP LAB;  Service: Vascular;;    TOE AMPUTATION Left 8/26/2022    Procedure: AMPUTATION, TOE;  Surgeon: Porfirio Ponce DPM;  Location: [facility] OR;  Service: Podiatry;  Laterality: Left;     Social History     Tobacco Use    Smoking status: Never    Smokeless tobacco: Never   Substance Use Topics    Alcohol use: No    Drug use: No         Objective      Vitals:    03/21/24 0800   BP: (!) 157/62   Pulse: (!) 47       LAST EKG  Results for orders placed or performed during the hospital encounter of 01/29/24   EKG 12-lead    Collection Time:  01/29/24  3:15 PM    Narrative    Test Reason : R07.9,    Vent. Rate : 077 BPM     Atrial Rate : 077 BPM     P-R Int : 122 ms          QRS Dur : 086 ms      QT Int : 410 ms       P-R-T Axes : 062 -46 076 degrees     QTc Int : 463 ms    Normal sinus rhythm  Left anterior fascicular block  Abnormal ECG  When compared with ECG of 22-JAN-2024 13:55,  No significant change was found  Confirmed by Kp ZUNIGA, Cory STARKS ([id number]) on 2/3/2024 10:11:39 AM    Referred By: [email or web address]   SELF           Confirmed By:Cory Goodrich MD     LIPIDS - LAST 2   Lab Results   Component Value Date    CHOL 174 01/29/2024    CHOL 199 07/27/2023    HDL 39 (L) 01/29/2024    HDL 45 07/27/2023    LDLCALC Invalid, Trig>400.0 01/29/2024    LDLCALC 127.6 07/27/2023    TRIG 419 (H) 01/29/2024    TRIG 132 07/27/2023    CHOLHDL 22.4 01/29/2024    CHOLHDL 22.6 07/27/2023     CARDIAC PROFILE - LAST 2  Lab Results   Component Value Date     (H) 01/29/2024     (H) 11/02/2022    CPK 62 08/18/2022    CPK 38 08/08/2022    CPKMB 3.9 08/18/2022    TROPONINI <0.030 11/03/2022    TROPONINI <0.030 11/03/2022      CBC - LAST 2  Lab Results   Component Value Date    WBC 7.55 01/31/2024    WBC 7.47 01/30/2024    RBC 3.57 (L) 01/31/2024    RBC 3.58 (L) 01/30/2024    HGB 10.0 (L) 01/31/2024    HGB 10.2 (L) 01/30/2024    HCT 32.1 (L) 01/31/2024    HCT 31.6 (L) 01/30/2024     01/31/2024     01/30/2024     Lab Results   Component Value Date    LABPT 15.3 (H) 05/04/2022    LABPT 16.0 (H) 05/19/2021    INR 0.9 01/29/2024    INR 1.3 05/04/2022    APTT 22.5 (L) 05/04/2022    APTT 23.1 03/10/2022     CHEMISTRY - LAST 2  Lab Results   Component Value Date     01/31/2024     01/30/2024    K 5.7 (H) 01/31/2024    K 5.3 (H) 01/30/2024     01/31/2024     01/30/2024    CO2 24 01/31/2024    CO2 26 01/30/2024    ANIONGAP 8 01/31/2024    ANIONGAP 11 01/30/2024    BUN 57 (H) 01/31/2024    BUN 68 (H) 01/30/2024    CREATININE 6.0 (H)  01/31/2024    CREATININE 6.2 (H) 01/30/2024     01/31/2024     (H) 01/30/2024    CALCIUM 8.4 (L) 01/31/2024    CALCIUM 9.4 01/30/2024    PH 7.273 (L) 05/19/2021    MG 2.1 01/29/2024    MG 2.2 01/22/2024    ALBUMIN 3.8 01/29/2024    ALBUMIN 3.5 01/24/2024    PROT 7.0 01/29/2024    PROT 6.0 01/24/2024    ALKPHOS 96 01/29/2024    ALKPHOS 73 01/24/2024    ALT 22 01/29/2024    ALT 9 (L) 01/24/2024    AST 19 01/29/2024    AST 11 01/24/2024    BILITOT 0.3 01/29/2024    BILITOT 0.3 01/24/2024      ENDOCRINE - LAST 2  Lab Results   Component Value Date    HGBA1C 5.8 01/29/2024    HGBA1C 8.3 (H) 06/06/2023    TSH 5.000 11/03/2022    TSH 1.080 05/04/2022        PHYSICAL EXAM  CONSTITUTIONAL: chronically ill appearing pleasant female in wheelchair frequently coughing in no apparent distress  NECK: no carotid bruit, no JVD  LUNGS: CTA  CHEST WALL: no tenderness  HEART: regular rate and rhythm, S1, S2 normal, + murmur  ABDOMEN: soft, non-tender; bowel sounds normal; no masses  EXTREMITIES: left foot wound wrapped  NEURO: AAO X 3, speech clear, memory clear    I HAVE REVIEWED :    The vital signs, most recent cardiac testing, and most recent pertinent non-cardiology provider notes.    Current Outpatient Medications   Medication Instructions    acetaminophen (TYLENOL) 325 mg, Oral, Every 6 hours    amLODIPine (NORVASC) 5 MG tablet 1 tablet, Oral, Daily    aspirin (ECOTRIN) 81 mg, Oral, Daily    atorvastatin (LIPITOR) 80 mg, Oral, Daily    blood sugar diagnostic Strp To check BG 4 times daily, to use with insurance preferred meter    blood-glucose meter kit To check BG 3 times daily, to use with insurance preferred meter    cetirizine (ZYRTEC) 10 mg, Oral, Every other day    cinacalcet (SENSIPAR) 60 MG Tab 2 tablets, Oral, 2 times daily with meals    clopidogreL (PLAVIX) 75 mg, Oral, Daily    collagenase (SANTYL) ointment Topical (Top), Daily    dextromethorphan-guaiFENesin 5-100 mg/5 mL Liqd 5 mLs, Oral, 2 times daily  "   doxycycline (MONODOX) 100 MG capsule TAKE 1 CAPSULE BY MOUTH TWICE DAILY FOR 14 DAYS    doxycycline (VIBRAMYCIN) 100 mg, Oral, 2 times daily    epoetin chris-epbx (RETACRIT) 50 Units/kg, Subcutaneous, Every Mon, Wed, Fri    gabapentin (NEURONTIN) 100 mg, Oral, 2 times daily    lancets Misc To check BG 1 times daily, to use with insurance preferred meter    lancets Misc To check BG 3 times daily, to use with insurance preferred meter    minoxidiL (LONITEN) 2.5 MG tablet 2 tablets, Oral, 2 times daily    multivitamin (THERAGRAN) per tablet 1 tablet, Oral, Daily    nitroGLYCERIN (NITROSTAT) 0.4 mg, Sublingual, Every 5 min PRN    ondansetron (ZOFRAN-ODT) 4 mg, Oral, Every 8 hours PRN    OZEMPIC 0.25 mg or 0.5 mg (2 mg/3 mL) pen injector INJECT 0.25 MG SUBCUTANEOUSLY ONCE A WEEK    pantoprazole (PROTONIX) 40 mg, Oral, Daily    pen needle, diabetic (BD ULTRA-FINE ROBERT PEN NEEDLE) 32 gauge x 5/32" Ndle 1 pen , Misc.(Non-Drug; Combo Route), 4 times daily, To use 4 times per day with insulin injections.    sucroferric oxyhydroxide (VELPHORO) 500 mg Chew 2 tablets, Oral, 3 times daily      Assessment & Plan     Chronic sinusitis  She is on doxycycline already for foot wound   Continue zyrtec  Called in dextrometh/guaf cough syrup    Chronic diastolic heart failure  Patient has chronic diastolic heart failure   Last ECHO (1/22/2024)    Left Ventricle: The left ventricle is normal in size. Mildly increased wall thickness. There is mild concentric hypertrophy. Normal wall motion. There is normal systolic function with a visually estimated ejection fraction of 60 - 65%. There is normal diastolic function.    Right Ventricle: Normal right ventricular cavity size. Wall thickness is normal. Right ventricle wall motion  is normal. Systolic function is normal.    Aortic Valve: There is mild aortic valve sclerosis.    Mitral Valve: There is mild regurgitation with a centrally directed jet.    IVC/SVC: Normal venous pressure at 3 " mmHg.    Pericardium: There is a trivial posterior effusion. No indication of cardiac tamponade   ESRD on HD MWF- she is on 32 oz fluid restriction; Renal diet  Check cxray today    Peripheral vascular disease  Continue aspirin plavix and statin    ESRD (end stage renal disease)  HD on MWF    Toenail deformity  Referral sent to Dr Ponce for nail care        F/UP after CXR  No follow-ups on file.              MARIANNE Wall

## 2024-03-21 NOTE — ASSESSMENT & PLAN NOTE
Patient has chronic diastolic heart failure   Last ECHO (1/22/2024)    Left Ventricle: The left ventricle is normal in size. Mildly increased wall thickness. There is mild concentric hypertrophy. Normal wall motion. There is normal systolic function with a visually estimated ejection fraction of 60 - 65%. There is normal diastolic function.    Right Ventricle: Normal right ventricular cavity size. Wall thickness is normal. Right ventricle wall motion  is normal. Systolic function is normal.    Aortic Valve: There is mild aortic valve sclerosis.    Mitral Valve: There is mild regurgitation with a centrally directed jet.    IVC/SVC: Normal venous pressure at 3 mmHg.    Pericardium: There is a trivial posterior effusion. No indication of cardiac tamponade   ESRD on HD MWF- she is on 32 oz fluid restriction; Renal diet  Check cxray today

## 2024-03-22 ENCOUNTER — TELEPHONE (OUTPATIENT)
Dept: CARDIOLOGY | Facility: CLINIC | Age: 59
End: 2024-03-22
Payer: MEDICARE

## 2024-03-22 DIAGNOSIS — R00.1 BRADYCARDIA: Primary | ICD-10-CM

## 2024-03-22 NOTE — TELEPHONE ENCOUNTER
----- Message from Ro Hardy sent at 3/22/2024  1:15 PM CDT -----  Type: Needs Medical Advice  Who Called:  patient  Best Call Back Number: [phone] (home)   Additional Information: patients HR has been low when she goes to dialysis, Dr Arzate has been trying to reach out to dr hay.

## 2024-03-26 ENCOUNTER — OFFICE VISIT (OUTPATIENT)
Dept: WOUND CARE | Facility: HOSPITAL | Age: 59
End: 2024-03-26
Attending: FAMILY MEDICINE
Payer: MEDICARE

## 2024-03-26 VITALS
DIASTOLIC BLOOD PRESSURE: 62 MMHG | RESPIRATION RATE: 18 BRPM | SYSTOLIC BLOOD PRESSURE: 145 MMHG | HEART RATE: 75 BPM | TEMPERATURE: 98 F

## 2024-03-26 DIAGNOSIS — E11.641 TYPE 2 DIABETES MELLITUS WITH HYPOGLYCEMIA AND COMA, WITH LONG-TERM CURRENT USE OF INSULIN: ICD-10-CM

## 2024-03-26 DIAGNOSIS — E11.22 TYPE 2 DIABETES MELLITUS WITH CHRONIC KIDNEY DISEASE ON CHRONIC DIALYSIS, WITH LONG-TERM CURRENT USE OF INSULIN: ICD-10-CM

## 2024-03-26 DIAGNOSIS — N18.6 TYPE 2 DIABETES MELLITUS WITH CHRONIC KIDNEY DISEASE ON CHRONIC DIALYSIS, WITH LONG-TERM CURRENT USE OF INSULIN: ICD-10-CM

## 2024-03-26 DIAGNOSIS — Z79.4 TYPE 2 DIABETES MELLITUS WITH CHRONIC KIDNEY DISEASE ON CHRONIC DIALYSIS, WITH LONG-TERM CURRENT USE OF INSULIN: ICD-10-CM

## 2024-03-26 DIAGNOSIS — Z99.2 TYPE 2 DIABETES MELLITUS WITH CHRONIC KIDNEY DISEASE ON CHRONIC DIALYSIS, WITH LONG-TERM CURRENT USE OF INSULIN: ICD-10-CM

## 2024-03-26 DIAGNOSIS — L97.423 DIABETIC ULCER OF LEFT MIDFOOT ASSOCIATED WITH TYPE 2 DIABETES MELLITUS, WITH NECROSIS OF MUSCLE: ICD-10-CM

## 2024-03-26 DIAGNOSIS — L97.523 ULCER OF LEFT FOOT WITH NECROSIS OF MUSCLE: Primary | ICD-10-CM

## 2024-03-26 DIAGNOSIS — E11.621 DIABETIC ULCER OF LEFT MIDFOOT ASSOCIATED WITH TYPE 2 DIABETES MELLITUS, WITH NECROSIS OF MUSCLE: ICD-10-CM

## 2024-03-26 DIAGNOSIS — Z79.4 TYPE 2 DIABETES MELLITUS WITH HYPOGLYCEMIA AND COMA, WITH LONG-TERM CURRENT USE OF INSULIN: ICD-10-CM

## 2024-03-26 DIAGNOSIS — L97.522 ULCER OF LEFT FOOT WITH FAT LAYER EXPOSED: ICD-10-CM

## 2024-03-26 PROCEDURE — 1160F RVW MEDS BY RX/DR IN RCRD: CPT | Mod: HCNC,CPTII,, | Performed by: FAMILY MEDICINE

## 2024-03-26 PROCEDURE — 1159F MED LIST DOCD IN RCRD: CPT | Mod: HCNC,CPTII,, | Performed by: FAMILY MEDICINE

## 2024-03-26 PROCEDURE — 3066F NEPHROPATHY DOC TX: CPT | Mod: HCNC,CPTII,, | Performed by: FAMILY MEDICINE

## 2024-03-26 PROCEDURE — 3044F HG A1C LEVEL LT 7.0%: CPT | Mod: HCNC,CPTII,, | Performed by: FAMILY MEDICINE

## 2024-03-26 PROCEDURE — 99213 OFFICE O/P EST LOW 20 MIN: CPT | Mod: 25,HCNC,, | Performed by: FAMILY MEDICINE

## 2024-03-26 PROCEDURE — 11042 DBRDMT SUBQ TIS 1ST 20SQCM/<: CPT | Mod: HCNC,PN | Performed by: FAMILY MEDICINE

## 2024-03-26 PROCEDURE — 11042 DBRDMT SUBQ TIS 1ST 20SQCM/<: CPT | Mod: HCNC,,, | Performed by: FAMILY MEDICINE

## 2024-03-26 RX ORDER — BALSAM PERU/CASTOR OIL
OINTMENT (GRAM) TOPICAL DAILY
Qty: 56.7 G | Refills: 2 | Status: SHIPPED | OUTPATIENT
Start: 2024-03-26 | End: 2024-05-25

## 2024-03-26 NOTE — PROGRESS NOTES
Wound Care Progress Note    Subjective:       Patient ID: Leanna García is a 58 y.o. female.    Chief Complaint: Wound Care    HPI  Pt seen in clinic for a FU visit for a left foot DM ulcer. Wound is stable, needs to be stimulated. No new complaints today  Review of Systems   Skin:  Positive for wound.        Left foot DM ulcer   All other systems reviewed and are negative.      Objective:        Physical Exam  Vitals and nursing note reviewed. Exam conducted with a chaperone present.   Constitutional:       General: She is not in acute distress.  Skin:     General: Skin is warm and dry.      Findings: Lesion present.      Comments: Left foot DM ulcer, stable, see wound care assessment documentation in chart review scanned under the media tab   Neurological:      General: No focal deficit present.      Mental Status: She is alert.   Psychiatric:         Mood and Affect: Mood normal.         Thought Content: Thought content normal.         Judgment: Judgment normal.       There were no vitals filed for this visit.    Assessment:           ICD-10-CM ICD-9-CM   1. Ulcer of left foot with necrosis of muscle  L97.523 707.15     728.89   2. Type 2 diabetes mellitus with chronic kidney disease on chronic dialysis, with long-term current use of insulin  E11.22 250.40    N18.6 585.9    Z99.2 V45.11    Z79.4 V58.67   3. Type 2 diabetes mellitus with hypoglycemia and coma, with long-term current use of insulin  E11.641 250.30    Z79.4 V58.67                Plan:                  Leanna was seen today for wound care.    Diagnoses and all orders for this visit:    Ulcer of left foot with necrosis of muscle    Type 2 diabetes mellitus with chronic kidney disease on chronic dialysis, with long-term current use of insulin    Type 2 diabetes mellitus with hypoglycemia and coma, with long-term current use of insulin    Other orders  -     balsam peru-castor oiL (VENELEX) Oint; Apply topically once daily.      See wound  care instructions provided separately

## 2024-04-04 ENCOUNTER — OFFICE VISIT (OUTPATIENT)
Dept: WOUND CARE | Facility: HOSPITAL | Age: 59
End: 2024-04-04
Attending: FAMILY MEDICINE
Payer: MEDICARE

## 2024-04-04 ENCOUNTER — PATIENT MESSAGE (OUTPATIENT)
Dept: ADMINISTRATIVE | Facility: HOSPITAL | Age: 59
End: 2024-04-04
Payer: MEDICARE

## 2024-04-04 VITALS
RESPIRATION RATE: 18 BRPM | SYSTOLIC BLOOD PRESSURE: 140 MMHG | HEART RATE: 87 BPM | DIASTOLIC BLOOD PRESSURE: 68 MMHG | TEMPERATURE: 98 F

## 2024-04-04 DIAGNOSIS — E11.59 HYPERTENSION ASSOCIATED WITH DIABETES: ICD-10-CM

## 2024-04-04 DIAGNOSIS — L97.423 DIABETIC ULCER OF LEFT MIDFOOT ASSOCIATED WITH TYPE 2 DIABETES MELLITUS, WITH NECROSIS OF MUSCLE: ICD-10-CM

## 2024-04-04 DIAGNOSIS — L60.2 ONYCHOGRYPHOSIS: ICD-10-CM

## 2024-04-04 DIAGNOSIS — Z79.4 TYPE 2 DIABETES MELLITUS WITH CHRONIC KIDNEY DISEASE ON CHRONIC DIALYSIS, WITH LONG-TERM CURRENT USE OF INSULIN: ICD-10-CM

## 2024-04-04 DIAGNOSIS — Z79.4 TYPE 2 DIABETES MELLITUS WITH HYPOGLYCEMIA AND COMA, WITH LONG-TERM CURRENT USE OF INSULIN: ICD-10-CM

## 2024-04-04 DIAGNOSIS — I73.9 PERIPHERAL VASCULAR DISEASE: ICD-10-CM

## 2024-04-04 DIAGNOSIS — L03.119 CELLULITIS AND ABSCESS OF FOOT: ICD-10-CM

## 2024-04-04 DIAGNOSIS — L97.523 ULCER OF LEFT FOOT WITH NECROSIS OF MUSCLE: Primary | ICD-10-CM

## 2024-04-04 DIAGNOSIS — E11.40 TYPE 2 DIABETES MELLITUS WITH DIABETIC NEUROPATHY, UNSPECIFIED WHETHER LONG TERM INSULIN USE: ICD-10-CM

## 2024-04-04 DIAGNOSIS — L97.522 ULCER OF LEFT FOOT WITH FAT LAYER EXPOSED: ICD-10-CM

## 2024-04-04 DIAGNOSIS — L02.619 CELLULITIS AND ABSCESS OF FOOT: ICD-10-CM

## 2024-04-04 DIAGNOSIS — Z89.432 HISTORY OF AMPUTATION OF LEFT FOOT: ICD-10-CM

## 2024-04-04 DIAGNOSIS — Z91.89 AT HIGH RISK FOR INADEQUATE NUTRITIONAL INTAKE: ICD-10-CM

## 2024-04-04 DIAGNOSIS — R09.89 DECREASED PEDAL PULSES: ICD-10-CM

## 2024-04-04 DIAGNOSIS — E11.649 TYPE 2 DIABETES MELLITUS WITH HYPOGLYCEMIA UNAWARENESS: ICD-10-CM

## 2024-04-04 DIAGNOSIS — B35.3 TINEA PEDIS OF BOTH FEET: ICD-10-CM

## 2024-04-04 DIAGNOSIS — N18.6 ESRD (END STAGE RENAL DISEASE): ICD-10-CM

## 2024-04-04 DIAGNOSIS — E11.628 DIABETIC FOOT INFECTION: ICD-10-CM

## 2024-04-04 DIAGNOSIS — Z99.2 TYPE 2 DIABETES MELLITUS WITH CHRONIC KIDNEY DISEASE ON CHRONIC DIALYSIS, WITH LONG-TERM CURRENT USE OF INSULIN: ICD-10-CM

## 2024-04-04 DIAGNOSIS — I15.2 HYPERTENSION ASSOCIATED WITH DIABETES: ICD-10-CM

## 2024-04-04 DIAGNOSIS — R26.2 DIFFICULTY IN WALKING, NOT ELSEWHERE CLASSIFIED: ICD-10-CM

## 2024-04-04 DIAGNOSIS — T14.8XXA WOUND INFECTION: ICD-10-CM

## 2024-04-04 DIAGNOSIS — E11.22 TYPE 2 DIABETES MELLITUS WITH CHRONIC KIDNEY DISEASE ON CHRONIC DIALYSIS, WITH LONG-TERM CURRENT USE OF INSULIN: ICD-10-CM

## 2024-04-04 DIAGNOSIS — N18.6 TYPE 2 DIABETES MELLITUS WITH CHRONIC KIDNEY DISEASE ON CHRONIC DIALYSIS, WITH LONG-TERM CURRENT USE OF INSULIN: ICD-10-CM

## 2024-04-04 DIAGNOSIS — E11.621 DIABETIC ULCER OF LEFT MIDFOOT ASSOCIATED WITH TYPE 2 DIABETES MELLITUS, WITH NECROSIS OF MUSCLE: ICD-10-CM

## 2024-04-04 DIAGNOSIS — L08.9 DIABETIC FOOT INFECTION: ICD-10-CM

## 2024-04-04 DIAGNOSIS — R60.0 BILATERAL LOWER EXTREMITY EDEMA: ICD-10-CM

## 2024-04-04 DIAGNOSIS — L08.9 WOUND INFECTION: ICD-10-CM

## 2024-04-04 DIAGNOSIS — E11.641 TYPE 2 DIABETES MELLITUS WITH HYPOGLYCEMIA AND COMA, WITH LONG-TERM CURRENT USE OF INSULIN: ICD-10-CM

## 2024-04-04 PROCEDURE — 11043 DBRDMT MUSC&/FSCA 1ST 20/<: CPT | Mod: HCNC,,, | Performed by: FAMILY MEDICINE

## 2024-04-04 PROCEDURE — 99499 UNLISTED E&M SERVICE: CPT | Mod: HCNC,,, | Performed by: FAMILY MEDICINE

## 2024-04-04 PROCEDURE — 11043 DBRDMT MUSC&/FSCA 1ST 20/<: CPT | Mod: HCNC,PN | Performed by: FAMILY MEDICINE

## 2024-04-04 NOTE — PROGRESS NOTES
Wound Care Progress Note    Subjective:       Patient ID: Leanna García is a 58 y.o. female.    Chief Complaint: No chief complaint on file.    HPI  Pt seen in clinic for a FU visit for left foot DM ulcer. Pt was able to get her venelex. Pt wound is near the bone. No new complaints today  Review of Systems   Skin:  Positive for wound.        Left foot DM ulcer   All other systems reviewed and are negative.      Objective:        Physical Exam  Vitals and nursing note reviewed.   Constitutional:       General: She is not in acute distress.     Appearance: Normal appearance.   Skin:     General: Skin is warm and dry.      Findings: Lesion present.      Comments: Left foot DM ulcer, see wound care assessment documentation in chart review scanned in the media tab   Neurological:      General: No focal deficit present.      Mental Status: She is alert and oriented to person, place, and time.   Psychiatric:         Mood and Affect: Mood normal.         Thought Content: Thought content normal.         Judgment: Judgment normal.       There were no vitals filed for this visit.    Assessment:           ICD-10-CM ICD-9-CM   1. Ulcer of left foot with necrosis of muscle  L97.523 707.15     728.89   2. Type 2 diabetes mellitus with chronic kidney disease on chronic dialysis, with long-term current use of insulin  E11.22 250.40    N18.6 585.9    Z99.2 V45.11    Z79.4 V58.67   3. Type 2 diabetes mellitus with hypoglycemia and coma, with long-term current use of insulin  E11.641 250.30    Z79.4 V58.67   4. Ulcer of left foot with fat layer exposed  L97.522 707.15                Plan:                  Diagnoses and all orders for this visit:    Ulcer of left foot with necrosis of muscle    Type 2 diabetes mellitus with chronic kidney disease on chronic dialysis, with long-term current use of insulin    Type 2 diabetes mellitus with hypoglycemia and coma, with long-term current use of insulin    Ulcer of left foot with  fat layer exposed      Please see wound care instructions which have been provided separately.

## 2024-04-09 ENCOUNTER — PATIENT MESSAGE (OUTPATIENT)
Dept: ADMINISTRATIVE | Facility: HOSPITAL | Age: 59
End: 2024-04-09
Payer: MEDICARE

## 2024-04-11 ENCOUNTER — OFFICE VISIT (OUTPATIENT)
Dept: WOUND CARE | Facility: HOSPITAL | Age: 59
End: 2024-04-11
Attending: FAMILY MEDICINE
Payer: MEDICARE

## 2024-04-11 VITALS
DIASTOLIC BLOOD PRESSURE: 70 MMHG | TEMPERATURE: 98 F | SYSTOLIC BLOOD PRESSURE: 142 MMHG | RESPIRATION RATE: 16 BRPM | HEART RATE: 89 BPM

## 2024-04-11 DIAGNOSIS — Z79.4 TYPE 2 DIABETES MELLITUS WITH CHRONIC KIDNEY DISEASE ON CHRONIC DIALYSIS, WITH LONG-TERM CURRENT USE OF INSULIN: ICD-10-CM

## 2024-04-11 DIAGNOSIS — T14.8XXA WOUND INFECTION: ICD-10-CM

## 2024-04-11 DIAGNOSIS — E11.641 TYPE 2 DIABETES MELLITUS WITH HYPOGLYCEMIA AND COMA, WITH LONG-TERM CURRENT USE OF INSULIN: ICD-10-CM

## 2024-04-11 DIAGNOSIS — L97.523 ULCER OF LEFT FOOT WITH NECROSIS OF MUSCLE: Primary | ICD-10-CM

## 2024-04-11 DIAGNOSIS — L97.522 ULCER OF LEFT FOOT WITH FAT LAYER EXPOSED: ICD-10-CM

## 2024-04-11 DIAGNOSIS — Z99.2 TYPE 2 DIABETES MELLITUS WITH CHRONIC KIDNEY DISEASE ON CHRONIC DIALYSIS, WITH LONG-TERM CURRENT USE OF INSULIN: ICD-10-CM

## 2024-04-11 DIAGNOSIS — E11.621 DIABETIC ULCER OF LEFT MIDFOOT ASSOCIATED WITH TYPE 2 DIABETES MELLITUS, WITH NECROSIS OF MUSCLE: ICD-10-CM

## 2024-04-11 DIAGNOSIS — N18.6 TYPE 2 DIABETES MELLITUS WITH CHRONIC KIDNEY DISEASE ON CHRONIC DIALYSIS, WITH LONG-TERM CURRENT USE OF INSULIN: ICD-10-CM

## 2024-04-11 DIAGNOSIS — E11.22 TYPE 2 DIABETES MELLITUS WITH CHRONIC KIDNEY DISEASE ON CHRONIC DIALYSIS, WITH LONG-TERM CURRENT USE OF INSULIN: ICD-10-CM

## 2024-04-11 DIAGNOSIS — L08.9 WOUND INFECTION: ICD-10-CM

## 2024-04-11 DIAGNOSIS — Z79.4 TYPE 2 DIABETES MELLITUS WITH HYPOGLYCEMIA AND COMA, WITH LONG-TERM CURRENT USE OF INSULIN: ICD-10-CM

## 2024-04-11 DIAGNOSIS — L97.423 DIABETIC ULCER OF LEFT MIDFOOT ASSOCIATED WITH TYPE 2 DIABETES MELLITUS, WITH NECROSIS OF MUSCLE: ICD-10-CM

## 2024-04-11 PROCEDURE — 11042 DBRDMT SUBQ TIS 1ST 20SQCM/<: CPT | Mod: HCNC,PN | Performed by: FAMILY MEDICINE

## 2024-04-11 PROCEDURE — 3066F NEPHROPATHY DOC TX: CPT | Mod: HCNC,CPTII,, | Performed by: FAMILY MEDICINE

## 2024-04-11 PROCEDURE — 3044F HG A1C LEVEL LT 7.0%: CPT | Mod: HCNC,CPTII,, | Performed by: FAMILY MEDICINE

## 2024-04-11 PROCEDURE — 3078F DIAST BP <80 MM HG: CPT | Mod: HCNC,CPTII,, | Performed by: FAMILY MEDICINE

## 2024-04-11 PROCEDURE — 11042 DBRDMT SUBQ TIS 1ST 20SQCM/<: CPT | Mod: HCNC,,, | Performed by: FAMILY MEDICINE

## 2024-04-11 PROCEDURE — 1159F MED LIST DOCD IN RCRD: CPT | Mod: HCNC,CPTII,, | Performed by: FAMILY MEDICINE

## 2024-04-11 PROCEDURE — 3077F SYST BP >= 140 MM HG: CPT | Mod: HCNC,CPTII,, | Performed by: FAMILY MEDICINE

## 2024-04-11 PROCEDURE — 99499 UNLISTED E&M SERVICE: CPT | Mod: HCNC,,, | Performed by: FAMILY MEDICINE

## 2024-04-11 RX ORDER — DOXYCYCLINE 100 MG/1
100 CAPSULE ORAL 2 TIMES DAILY
Qty: 20 CAPSULE | Refills: 0 | Status: SHIPPED | OUTPATIENT
Start: 2024-04-11 | End: 2024-04-21

## 2024-04-11 NOTE — PROGRESS NOTES
Wound Care Progress Note    Subjective:       Patient ID: Leanna García is a 58 y.o. female.    Chief Complaint: No chief complaint on file.    HPI  Pt seen in clinic for a FU visit for a left foot DM ulcer. Pt has increased serous drainage, mild erythema. Pt has no new complaints today wound otherwise doing well.  Review of Systems   Skin:  Positive for wound.        Open wound left foot   All other systems reviewed and are negative.      Objective:        Physical Exam  Vitals and nursing note reviewed.   Constitutional:       Appearance: Normal appearance. She is not toxic-appearing.   Skin:     General: Skin is warm and dry.      Findings: Lesion present.      Comments: Left foot DM ulcer, see wound care assessment documentation in  chart review scanned under the media tab   Neurological:      General: No focal deficit present.      Mental Status: She is alert.   Psychiatric:         Mood and Affect: Mood normal.         Thought Content: Thought content normal.         Judgment: Judgment normal.       There were no vitals filed for this visit.    Assessment:           ICD-10-CM ICD-9-CM   1. Ulcer of left foot with necrosis of muscle  L97.523 707.15     728.89   2. Type 2 diabetes mellitus with chronic kidney disease on chronic dialysis, with long-term current use of insulin  E11.22 250.40    N18.6 585.9    Z99.2 V45.11    Z79.4 V58.67   3. Type 2 diabetes mellitus with hypoglycemia and coma, with long-term current use of insulin  E11.641 250.30    Z79.4 V58.67   4. Ulcer of left foot with fat layer exposed  L97.522 707.15   5. Diabetic ulcer of left midfoot associated with type 2 diabetes mellitus, with necrosis of muscle  E11.621 250.80    L97.423 707.14     728.89   6. Wound infection  T14.8XXA 958.3    L08.9                 Plan:                  Diagnoses and all orders for this visit:    Ulcer of left foot with necrosis of muscle    Type 2 diabetes mellitus with chronic kidney disease on  chronic dialysis, with long-term current use of insulin    Type 2 diabetes mellitus with hypoglycemia and coma, with long-term current use of insulin    Ulcer of left foot with fat layer exposed    Diabetic ulcer of left midfoot associated with type 2 diabetes mellitus, with necrosis of muscle    Wound infection    Other orders  -     doxycycline (VIBRAMYCIN) 100 MG Cap; Take 1 capsule (100 mg total) by mouth 2 (two) times daily. for 10 days      See attached wound care documentation

## 2024-04-18 ENCOUNTER — OFFICE VISIT (OUTPATIENT)
Dept: WOUND CARE | Facility: HOSPITAL | Age: 59
End: 2024-04-18
Attending: FAMILY MEDICINE
Payer: MEDICARE

## 2024-04-18 VITALS
RESPIRATION RATE: 19 BRPM | DIASTOLIC BLOOD PRESSURE: 109 MMHG | TEMPERATURE: 98 F | HEART RATE: 74 BPM | SYSTOLIC BLOOD PRESSURE: 200 MMHG

## 2024-04-18 DIAGNOSIS — Z89.432 HISTORY OF AMPUTATION OF LEFT FOOT: ICD-10-CM

## 2024-04-18 DIAGNOSIS — Z79.4 TYPE 2 DIABETES MELLITUS WITH CHRONIC KIDNEY DISEASE ON CHRONIC DIALYSIS, WITH LONG-TERM CURRENT USE OF INSULIN: ICD-10-CM

## 2024-04-18 DIAGNOSIS — N18.6 TYPE 2 DIABETES MELLITUS WITH CHRONIC KIDNEY DISEASE ON CHRONIC DIALYSIS, WITH LONG-TERM CURRENT USE OF INSULIN: ICD-10-CM

## 2024-04-18 DIAGNOSIS — Z99.2 TYPE 2 DIABETES MELLITUS WITH CHRONIC KIDNEY DISEASE ON CHRONIC DIALYSIS, WITH LONG-TERM CURRENT USE OF INSULIN: ICD-10-CM

## 2024-04-18 DIAGNOSIS — E11.59 HYPERTENSION ASSOCIATED WITH DIABETES: ICD-10-CM

## 2024-04-18 DIAGNOSIS — E11.22 TYPE 2 DIABETES MELLITUS WITH CHRONIC KIDNEY DISEASE ON CHRONIC DIALYSIS, WITH LONG-TERM CURRENT USE OF INSULIN: ICD-10-CM

## 2024-04-18 DIAGNOSIS — Z91.89 AT HIGH RISK FOR INADEQUATE NUTRITIONAL INTAKE: ICD-10-CM

## 2024-04-18 DIAGNOSIS — R60.0 BILATERAL LOWER EXTREMITY EDEMA: ICD-10-CM

## 2024-04-18 DIAGNOSIS — N18.6 ESRD (END STAGE RENAL DISEASE): ICD-10-CM

## 2024-04-18 DIAGNOSIS — L97.423 DIABETIC ULCER OF LEFT MIDFOOT ASSOCIATED WITH TYPE 2 DIABETES MELLITUS, WITH NECROSIS OF MUSCLE: ICD-10-CM

## 2024-04-18 DIAGNOSIS — Z79.4 TYPE 2 DIABETES MELLITUS WITH HYPOGLYCEMIA AND COMA, WITH LONG-TERM CURRENT USE OF INSULIN: ICD-10-CM

## 2024-04-18 DIAGNOSIS — R09.89 DECREASED PEDAL PULSES: ICD-10-CM

## 2024-04-18 DIAGNOSIS — B35.3 TINEA PEDIS OF BOTH FEET: ICD-10-CM

## 2024-04-18 DIAGNOSIS — E11.621 DIABETIC ULCER OF LEFT MIDFOOT ASSOCIATED WITH TYPE 2 DIABETES MELLITUS, WITH NECROSIS OF MUSCLE: ICD-10-CM

## 2024-04-18 DIAGNOSIS — L97.522 ULCER OF LEFT FOOT WITH FAT LAYER EXPOSED: ICD-10-CM

## 2024-04-18 DIAGNOSIS — R26.2 DIFFICULTY IN WALKING, NOT ELSEWHERE CLASSIFIED: ICD-10-CM

## 2024-04-18 DIAGNOSIS — E11.40 TYPE 2 DIABETES MELLITUS WITH DIABETIC NEUROPATHY, UNSPECIFIED WHETHER LONG TERM INSULIN USE: ICD-10-CM

## 2024-04-18 DIAGNOSIS — L60.2 ONYCHOGRYPHOSIS: ICD-10-CM

## 2024-04-18 DIAGNOSIS — E11.649 TYPE 2 DIABETES MELLITUS WITH HYPOGLYCEMIA UNAWARENESS: ICD-10-CM

## 2024-04-18 DIAGNOSIS — I15.2 HYPERTENSION ASSOCIATED WITH DIABETES: ICD-10-CM

## 2024-04-18 DIAGNOSIS — E11.641 TYPE 2 DIABETES MELLITUS WITH HYPOGLYCEMIA AND COMA, WITH LONG-TERM CURRENT USE OF INSULIN: ICD-10-CM

## 2024-04-18 DIAGNOSIS — I73.9 PERIPHERAL VASCULAR DISEASE: ICD-10-CM

## 2024-04-18 DIAGNOSIS — L97.523 ULCER OF LEFT FOOT WITH NECROSIS OF MUSCLE: Primary | ICD-10-CM

## 2024-04-18 PROCEDURE — 11043 DBRDMT MUSC&/FSCA 1ST 20/<: CPT | Mod: HCNC,,, | Performed by: FAMILY MEDICINE

## 2024-04-18 PROCEDURE — 99499 UNLISTED E&M SERVICE: CPT | Mod: HCNC,,, | Performed by: FAMILY MEDICINE

## 2024-04-18 PROCEDURE — 11043 DBRDMT MUSC&/FSCA 1ST 20/<: CPT | Mod: HCNC,PN | Performed by: FAMILY MEDICINE

## 2024-04-18 NOTE — PROGRESS NOTES
Wound Care Progress Note    Subjective:       Patient ID: Leanna García is a 58 y.o. female.    Chief Complaint: Pressure Ulcer, Diabetic Foot Ulcer, Wound Care, Foot Ulcer, Diabetes, Callouses, Peripheral Neuropathy, Wound Check, Numbness, Difficulty Walking, Wound Consult, Non-healing Wound Follow Up, and Non-healing Wound    Foot Ulcer    Diabetes  Associated symptoms include foot ulcerations.   Wound Check    Difficulty Walking    Pt seen in clinic for a FU visit for a left foot DM ulcer. Wound is stable, pt to get the VAC now, has been resisting up to now. No ne w complaints today  Review of Systems   Skin:  Positive for wound.        Open wound left foot   All other systems reviewed and are negative.        Objective:        Physical Exam  Vitals and nursing note reviewed.   Constitutional:       General: She is not in acute distress.     Appearance: Normal appearance.   Skin:     General: Skin is warm and dry.      Findings: Lesion present.      Comments: Left foot DM ulcer, see wound care assessment documentation in chart review scanned under the media tab   Neurological:      General: No focal deficit present.      Mental Status: She is alert and oriented to person, place, and time.   Psychiatric:         Mood and Affect: Mood normal.         Thought Content: Thought content normal.         Judgment: Judgment normal.       Vitals:    04/18/24 0952   BP: (!) 200/109   Pulse: 74   Resp: 19   Temp: 98.2 °F (36.8 °C)       Assessment:           ICD-10-CM ICD-9-CM   1. Ulcer of left foot with necrosis of muscle  L97.523 707.15     728.89   2. Type 2 diabetes mellitus with chronic kidney disease on chronic dialysis, with long-term current use of insulin  E11.22 250.40    N18.6 585.9    Z99.2 V45.11    Z79.4 V58.67   3. Type 2 diabetes mellitus with hypoglycemia and coma, with long-term current use of insulin  E11.641 250.30    Z79.4 V58.67   4. Diabetic ulcer of left midfoot associated with type 2  diabetes mellitus, with necrosis of muscle  E11.621 250.80    L97.423 707.14     728.89   5. Ulcer of left foot with fat layer exposed  L97.522 707.15   6. 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  E11.649 250.80   7. Bilateral lower extremity edema  R60.0 782.3   8. Difficulty in walking, not elsewhere classified  R26.2 719.7   9. At high risk for inadequate nutritional intake  Z91.89 V15.89   10. Peripheral vascular disease  I73.9 443.9   11. Type 2 diabetes mellitus with diabetic neuropathy, unspecified whether long term insulin use  E11.40 250.60     357.2   12. Tinea pedis of both feet  B35.3 110.4   13. Decreased pedal pulses  R09.89 785.9   14. Onychogryphosis  L60.2 703.8   15. ESRD (end stage renal disease)  N18.6 585.6   16. Hypertension associated with diabetes  E11.59 250.80    I15.2 401.9   17. History of amputation of left foot  Z89.432 V49.73                Plan:                  Leanna was seen today for pressure ulcer, diabetic foot ulcer, wound care, foot ulcer, diabetes, callouses, peripheral neuropathy, wound check, numbness, difficulty walking, wound consult, non-healing wound follow up and non-healing wound.    Diagnoses and all orders for this visit:    Ulcer of left foot with necrosis of muscle    Type 2 diabetes mellitus with chronic kidney disease on chronic dialysis, with long-term current use of insulin    Type 2 diabetes mellitus with hypoglycemia and coma, with long-term current use of insulin    Diabetic ulcer of left midfoot associated with type 2 diabetes mellitus, with necrosis of muscle    Ulcer of left foot with fat layer exposed    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    Bilateral lower extremity edema    Difficulty in walking, not elsewhere classified    At high risk for inadequate nutritional intake    Peripheral vascular disease    Type 2 diabetes mellitus with diabetic neuropathy, unspecified whether long term insulin use    Tinea pedis of both  feet    Decreased pedal pulses    Onychogryphosis    ESRD (end stage renal disease)    Hypertension associated with diabetes    History of amputation of left foot        Much of the documentation for this visit was completed in wound docs system. Please see the attached documentation for further details about the patients care. Scanned under the media tab.

## 2024-04-23 ENCOUNTER — OFFICE VISIT (OUTPATIENT)
Dept: WOUND CARE | Facility: HOSPITAL | Age: 59
End: 2024-04-23
Attending: FAMILY MEDICINE
Payer: MEDICARE

## 2024-04-23 VITALS
RESPIRATION RATE: 19 BRPM | SYSTOLIC BLOOD PRESSURE: 174 MMHG | DIASTOLIC BLOOD PRESSURE: 71 MMHG | TEMPERATURE: 98 F | HEART RATE: 73 BPM

## 2024-04-23 DIAGNOSIS — Z99.2 TYPE 2 DIABETES MELLITUS WITH CHRONIC KIDNEY DISEASE ON CHRONIC DIALYSIS, WITH LONG-TERM CURRENT USE OF INSULIN: ICD-10-CM

## 2024-04-23 DIAGNOSIS — L60.2 ONYCHOGRYPHOSIS: ICD-10-CM

## 2024-04-23 DIAGNOSIS — L08.9 DIABETIC FOOT INFECTION: ICD-10-CM

## 2024-04-23 DIAGNOSIS — E11.40 TYPE 2 DIABETES MELLITUS WITH DIABETIC NEUROPATHY, UNSPECIFIED WHETHER LONG TERM INSULIN USE: ICD-10-CM

## 2024-04-23 DIAGNOSIS — Z91.89 AT HIGH RISK FOR INADEQUATE NUTRITIONAL INTAKE: ICD-10-CM

## 2024-04-23 DIAGNOSIS — T14.8XXA WOUND INFECTION: ICD-10-CM

## 2024-04-23 DIAGNOSIS — L97.522 ULCER OF LEFT FOOT WITH FAT LAYER EXPOSED: ICD-10-CM

## 2024-04-23 DIAGNOSIS — N18.6 ESRD (END STAGE RENAL DISEASE): ICD-10-CM

## 2024-04-23 DIAGNOSIS — I15.2 HYPERTENSION ASSOCIATED WITH DIABETES: ICD-10-CM

## 2024-04-23 DIAGNOSIS — L08.9 WOUND INFECTION: ICD-10-CM

## 2024-04-23 DIAGNOSIS — I73.9 PERIPHERAL VASCULAR DISEASE: ICD-10-CM

## 2024-04-23 DIAGNOSIS — E11.641 TYPE 2 DIABETES MELLITUS WITH HYPOGLYCEMIA AND COMA, WITH LONG-TERM CURRENT USE OF INSULIN: ICD-10-CM

## 2024-04-23 DIAGNOSIS — E11.621 DIABETIC ULCER OF LEFT MIDFOOT ASSOCIATED WITH TYPE 2 DIABETES MELLITUS, WITH NECROSIS OF MUSCLE: ICD-10-CM

## 2024-04-23 DIAGNOSIS — R09.89 DECREASED PEDAL PULSES: ICD-10-CM

## 2024-04-23 DIAGNOSIS — E11.628 DIABETIC FOOT INFECTION: ICD-10-CM

## 2024-04-23 DIAGNOSIS — B35.3 TINEA PEDIS OF BOTH FEET: ICD-10-CM

## 2024-04-23 DIAGNOSIS — Z89.432 HISTORY OF AMPUTATION OF LEFT FOOT: ICD-10-CM

## 2024-04-23 DIAGNOSIS — Z79.4 TYPE 2 DIABETES MELLITUS WITH HYPOGLYCEMIA AND COMA, WITH LONG-TERM CURRENT USE OF INSULIN: ICD-10-CM

## 2024-04-23 DIAGNOSIS — E11.22 TYPE 2 DIABETES MELLITUS WITH CHRONIC KIDNEY DISEASE ON CHRONIC DIALYSIS, WITH LONG-TERM CURRENT USE OF INSULIN: ICD-10-CM

## 2024-04-23 DIAGNOSIS — Z79.4 TYPE 2 DIABETES MELLITUS WITH CHRONIC KIDNEY DISEASE ON CHRONIC DIALYSIS, WITH LONG-TERM CURRENT USE OF INSULIN: ICD-10-CM

## 2024-04-23 DIAGNOSIS — L97.423 DIABETIC ULCER OF LEFT MIDFOOT ASSOCIATED WITH TYPE 2 DIABETES MELLITUS, WITH NECROSIS OF MUSCLE: ICD-10-CM

## 2024-04-23 DIAGNOSIS — E11.649 TYPE 2 DIABETES MELLITUS WITH HYPOGLYCEMIA UNAWARENESS: ICD-10-CM

## 2024-04-23 DIAGNOSIS — E11.59 HYPERTENSION ASSOCIATED WITH DIABETES: ICD-10-CM

## 2024-04-23 DIAGNOSIS — R60.0 BILATERAL LOWER EXTREMITY EDEMA: ICD-10-CM

## 2024-04-23 DIAGNOSIS — L97.523 ULCER OF LEFT FOOT WITH NECROSIS OF MUSCLE: Primary | ICD-10-CM

## 2024-04-23 DIAGNOSIS — N18.6 TYPE 2 DIABETES MELLITUS WITH CHRONIC KIDNEY DISEASE ON CHRONIC DIALYSIS, WITH LONG-TERM CURRENT USE OF INSULIN: ICD-10-CM

## 2024-04-23 DIAGNOSIS — R26.2 DIFFICULTY IN WALKING, NOT ELSEWHERE CLASSIFIED: ICD-10-CM

## 2024-04-23 PROCEDURE — 11043 DBRDMT MUSC&/FSCA 1ST 20/<: CPT | Mod: HCNC,,, | Performed by: FAMILY MEDICINE

## 2024-04-23 PROCEDURE — 11043 DBRDMT MUSC&/FSCA 1ST 20/<: CPT | Mod: HCNC,PN | Performed by: FAMILY MEDICINE

## 2024-04-23 PROCEDURE — 99499 UNLISTED E&M SERVICE: CPT | Mod: HCNC,,, | Performed by: FAMILY MEDICINE

## 2024-04-23 NOTE — PROGRESS NOTES
Wound Care Progress Note    Subjective:       Patient ID: Leanna García is a 58 y.o. female.    Chief Complaint: Pressure Ulcer, Wound Consult, Wound Care, Diabetic Foot Ulcer, Wound Infection, Foot Ulcer, Callouses, Diabetes, Difficulty Walking, Poor Circulation, Numbness, Wound Check, and Peripheral Neuropathy    Foot Ulcer    Diabetes  Associated symptoms include foot ulcerations.   Difficulty Walking    Wound Check  Pt seen in clinic for a FU visit for a left foot DM ulcer. Pt waiting on a wound VAC, finally agreed to have one. Pt has no new complaints today  Review of Systems   Skin:  Positive for wound.        Left foot DM ulcer   All other systems reviewed and are negative.      Objective:        Physical Exam  Vitals and nursing note reviewed.   Constitutional:       General: She is not in acute distress.     Appearance: Normal appearance.   Skin:     General: Skin is warm and dry.      Findings: Lesion present.      Comments: Left foot DM ulcer, see wound care assessment documentation in chart review scanned under the media tab   Neurological:      General: No focal deficit present.      Mental Status: She is alert.   Psychiatric:         Mood and Affect: Mood normal.         Judgment: Judgment normal.       Vitals:    04/23/24 1034   BP: (!) 174/71   Pulse: 73   Resp: 19   Temp: 98.2 °F (36.8 °C)       Assessment:           ICD-10-CM ICD-9-CM   1. Ulcer of left foot with necrosis of muscle  L97.523 707.15     728.89   2. Type 2 diabetes mellitus with chronic kidney disease on chronic dialysis, with long-term current use of insulin  E11.22 250.40    N18.6 585.9    Z99.2 V45.11    Z79.4 V58.67   3. Type 2 diabetes mellitus with hypoglycemia and coma, with long-term current use of insulin  E11.641 250.30    Z79.4 V58.67   4. 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  E11.649 250.80   5. Ulcer of left foot with fat layer exposed  L97.522 707.15   6. Diabetic ulcer of left midfoot  associated with type 2 diabetes mellitus, with necrosis of muscle  E11.621 250.80    L97.423 707.14     728.89   7. Bilateral lower extremity edema  R60.0 782.3   8. Difficulty in walking, not elsewhere classified  R26.2 719.7   9. At high risk for inadequate nutritional intake  Z91.89 V15.89   10. Tinea pedis of both feet  B35.3 110.4   11. Type 2 diabetes mellitus with diabetic neuropathy, unspecified whether long term insulin use  E11.40 250.60     357.2   12. Peripheral vascular disease  I73.9 443.9   13. Decreased pedal pulses  R09.89 785.9   14. Onychogryphosis  L60.2 703.8   15. ESRD (end stage renal disease)  N18.6 585.6   16. Wound infection  T14.8XXA 958.3    L08.9    17. History of amputation of left foot  Z89.432 V49.73   18. Hypertension associated with diabetes  E11.59 250.80    I15.2 401.9   19. Diabetic foot infection  E11.628 250.80    L08.9 686.9                Plan:                  Leanna was seen today for pressure ulcer, wound consult, wound care, diabetic foot ulcer, wound infection, foot ulcer, callouses, diabetes, difficulty walking, poor circulation, numbness, wound check and peripheral neuropathy.    Diagnoses and all orders for this visit:    Ulcer of left foot with necrosis of muscle    Type 2 diabetes mellitus with chronic kidney disease on chronic dialysis, with long-term current use of insulin    Type 2 diabetes mellitus with hypoglycemia and coma, with long-term current use of insulin    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    Ulcer of left foot with fat layer exposed    Diabetic ulcer of left midfoot associated with type 2 diabetes mellitus, with necrosis of muscle    Bilateral lower extremity edema    Difficulty in walking, not elsewhere classified    At high risk for inadequate nutritional intake    Tinea pedis of both feet    Type 2 diabetes mellitus with diabetic neuropathy, unspecified whether long term insulin use    Peripheral vascular disease    Decreased  pedal pulses    Onychogryphosis    ESRD (end stage renal disease)    Wound infection    History of amputation of left foot    Hypertension associated with diabetes    Diabetic foot infection      Much of the documentation for this visit was completed in wound docs system. Please see the attached documentation for further details about the patients care. Scanned under the media tab.

## 2024-04-29 ENCOUNTER — TELEPHONE (OUTPATIENT)
Dept: CARDIOLOGY | Facility: CLINIC | Age: 59
End: 2024-04-29
Payer: MEDICARE

## 2024-04-29 NOTE — TELEPHONE ENCOUNTER
----- Message from Niurka Alvarez sent at 4/29/2024 10:54 AM CDT -----  Called the patient to schedule a monitor that Yany ordered in March and the patient stated that she is scheduled for an angio and she was never aware that she needed one. She wanted to know if something is wrong and if she need to have it done before her angio. Please give her a call and advise

## 2024-04-29 NOTE — TELEPHONE ENCOUNTER
Sw/ patient about her questions about the orders Yany [facility] put in for her to have a EKG and Holter monitor  she stated that she would have to call the office back to get rescheduled after she has an angiogram done with another provider

## 2024-05-02 ENCOUNTER — OFFICE VISIT (OUTPATIENT)
Dept: WOUND CARE | Facility: HOSPITAL | Age: 59
End: 2024-05-02
Attending: FAMILY MEDICINE
Payer: MEDICARE

## 2024-05-02 VITALS
SYSTOLIC BLOOD PRESSURE: 160 MMHG | DIASTOLIC BLOOD PRESSURE: 80 MMHG | TEMPERATURE: 99 F | RESPIRATION RATE: 18 BRPM | HEART RATE: 80 BPM

## 2024-05-02 DIAGNOSIS — Z79.4 TYPE 2 DIABETES MELLITUS WITH HYPOGLYCEMIA AND COMA, WITH LONG-TERM CURRENT USE OF INSULIN: ICD-10-CM

## 2024-05-02 DIAGNOSIS — E11.621 DIABETIC ULCER OF TOE OF LEFT FOOT ASSOCIATED WITH TYPE 2 DIABETES MELLITUS, WITH NECROSIS OF MUSCLE: ICD-10-CM

## 2024-05-02 DIAGNOSIS — E11.22 TYPE 2 DIABETES MELLITUS WITH CHRONIC KIDNEY DISEASE ON CHRONIC DIALYSIS, WITH LONG-TERM CURRENT USE OF INSULIN: ICD-10-CM

## 2024-05-02 DIAGNOSIS — Z99.2 TYPE 2 DIABETES MELLITUS WITH CHRONIC KIDNEY DISEASE ON CHRONIC DIALYSIS, WITH LONG-TERM CURRENT USE OF INSULIN: ICD-10-CM

## 2024-05-02 DIAGNOSIS — L97.523 DIABETIC ULCER OF TOE OF LEFT FOOT ASSOCIATED WITH TYPE 2 DIABETES MELLITUS, WITH NECROSIS OF MUSCLE: ICD-10-CM

## 2024-05-02 DIAGNOSIS — E11.641 TYPE 2 DIABETES MELLITUS WITH HYPOGLYCEMIA AND COMA, WITH LONG-TERM CURRENT USE OF INSULIN: ICD-10-CM

## 2024-05-02 DIAGNOSIS — L97.523 ULCER OF LEFT FOOT WITH NECROSIS OF MUSCLE: Primary | ICD-10-CM

## 2024-05-02 DIAGNOSIS — N18.6 TYPE 2 DIABETES MELLITUS WITH CHRONIC KIDNEY DISEASE ON CHRONIC DIALYSIS, WITH LONG-TERM CURRENT USE OF INSULIN: ICD-10-CM

## 2024-05-02 DIAGNOSIS — L08.9 WOUND INFECTION: ICD-10-CM

## 2024-05-02 DIAGNOSIS — T14.8XXA WOUND INFECTION: ICD-10-CM

## 2024-05-02 DIAGNOSIS — Z79.4 TYPE 2 DIABETES MELLITUS WITH CHRONIC KIDNEY DISEASE ON CHRONIC DIALYSIS, WITH LONG-TERM CURRENT USE OF INSULIN: ICD-10-CM

## 2024-05-02 PROCEDURE — 99499 UNLISTED E&M SERVICE: CPT | Mod: HCNC,,, | Performed by: FAMILY MEDICINE

## 2024-05-02 PROCEDURE — 11043 DBRDMT MUSC&/FSCA 1ST 20/<: CPT | Mod: HCNC,,, | Performed by: FAMILY MEDICINE

## 2024-05-02 PROCEDURE — 11043 DBRDMT MUSC&/FSCA 1ST 20/<: CPT | Mod: HCNC,PN | Performed by: FAMILY MEDICINE

## 2024-05-02 RX ORDER — DOXYCYCLINE 100 MG/1
100 CAPSULE ORAL 2 TIMES DAILY
Qty: 20 CAPSULE | Refills: 0 | Status: SHIPPED | OUTPATIENT
Start: 2024-05-02 | End: 2024-05-12

## 2024-05-02 NOTE — PROGRESS NOTES
Wound Care Progress Note    Subjective:       Patient ID: Leanna García is a 58 y.o. female.    Chief Complaint: Wound Care and Diabetic Foot Ulcer    HPI  Pt seen in clinic for a FU visit for a left foot DM ulcer. Wound is stable, waiting on VAC. Mildly erythematous with increased odor. No new complaints today  Review of Systems   Skin:  Positive for wound.        Left foot DM ulcer   All other systems reviewed and are negative.      Objective:        Physical Exam  Vitals and nursing note reviewed. Exam conducted with a chaperone present.   Constitutional:       General: She is not in acute distress.     Appearance: Normal appearance.   Skin:     General: Skin is warm and dry.      Findings: Lesion present.      Comments: Left foot DM ulcer, see wound care assessment documentation in chart review scanned under the media tab   Neurological:      General: No focal deficit present.      Mental Status: She is alert and oriented to person, place, and time.   Psychiatric:         Mood and Affect: Mood normal.         Thought Content: Thought content normal.         Judgment: Judgment normal.       Vitals:    05/02/24 1530   BP: (!) 160/80   Pulse: 80   Resp: 18   Temp: 98.5 °F (36.9 °C)       Assessment:           ICD-10-CM ICD-9-CM   1. Ulcer of left foot with necrosis of muscle  L97.523 707.15     728.89   2. Wound infection  T14.8XXA 958.3    L08.9    3. Type 2 diabetes mellitus with chronic kidney disease on chronic dialysis, with long-term current use of insulin  E11.22 250.40    N18.6 585.9    Z99.2 V45.11    Z79.4 V58.67   4. Type 2 diabetes mellitus with hypoglycemia and coma, with long-term current use of insulin  E11.641 250.30    Z79.4 V58.67                Plan:                  Leanna was seen today for wound care and diabetic foot ulcer.    Diagnoses and all orders for this visit:    Ulcer of left foot with necrosis of muscle    Wound infection    Type 2 diabetes mellitus with chronic kidney  disease on chronic dialysis, with long-term current use of insulin    Type 2 diabetes mellitus with hypoglycemia and coma, with long-term current use of insulin    Other orders  -     doxycycline (VIBRAMYCIN) 100 MG Cap; Take 1 capsule (100 mg total) by mouth 2 (two) times daily. for 10 days      Much of the documentation for this visit was completed in the Wound Doc[facility] system.  Please see the attached documentation for further details about the patient's care. Scanned under the Media tab.

## 2024-05-07 ENCOUNTER — OFFICE VISIT (OUTPATIENT)
Dept: WOUND CARE | Facility: HOSPITAL | Age: 59
End: 2024-05-07
Attending: FAMILY MEDICINE
Payer: MEDICARE

## 2024-05-07 VITALS
DIASTOLIC BLOOD PRESSURE: 81 MMHG | SYSTOLIC BLOOD PRESSURE: 161 MMHG | HEART RATE: 81 BPM | RESPIRATION RATE: 16 BRPM | TEMPERATURE: 99 F

## 2024-05-07 DIAGNOSIS — N18.6 TYPE 2 DIABETES MELLITUS WITH CHRONIC KIDNEY DISEASE ON CHRONIC DIALYSIS, WITH LONG-TERM CURRENT USE OF INSULIN: ICD-10-CM

## 2024-05-07 DIAGNOSIS — L97.523 ULCER OF LEFT FOOT WITH NECROSIS OF MUSCLE: Primary | ICD-10-CM

## 2024-05-07 DIAGNOSIS — E11.22 TYPE 2 DIABETES MELLITUS WITH CHRONIC KIDNEY DISEASE ON CHRONIC DIALYSIS, WITH LONG-TERM CURRENT USE OF INSULIN: ICD-10-CM

## 2024-05-07 DIAGNOSIS — E11.621 DIABETIC ULCER OF OTHER PART OF LEFT FOOT ASSOCIATED WITH TYPE 2 DIABETES MELLITUS, WITH NECROSIS OF MUSCLE: ICD-10-CM

## 2024-05-07 DIAGNOSIS — Z79.4 TYPE 2 DIABETES MELLITUS WITH CHRONIC KIDNEY DISEASE ON CHRONIC DIALYSIS, WITH LONG-TERM CURRENT USE OF INSULIN: ICD-10-CM

## 2024-05-07 DIAGNOSIS — Z99.2 TYPE 2 DIABETES MELLITUS WITH CHRONIC KIDNEY DISEASE ON CHRONIC DIALYSIS, WITH LONG-TERM CURRENT USE OF INSULIN: ICD-10-CM

## 2024-05-07 DIAGNOSIS — L97.523 DIABETIC ULCER OF OTHER PART OF LEFT FOOT ASSOCIATED WITH TYPE 2 DIABETES MELLITUS, WITH NECROSIS OF MUSCLE: ICD-10-CM

## 2024-05-07 PROCEDURE — 11042 DBRDMT SUBQ TIS 1ST 20SQCM/<: CPT | Mod: HCNC,PN | Performed by: FAMILY MEDICINE

## 2024-05-07 PROCEDURE — 97605 NEG PRS WND THER DME<=50SQCM: CPT | Mod: HCNC,PN | Performed by: FAMILY MEDICINE

## 2024-05-07 PROCEDURE — 11042 DBRDMT SUBQ TIS 1ST 20SQCM/<: CPT | Mod: HCNC,,, | Performed by: FAMILY MEDICINE

## 2024-05-07 PROCEDURE — 99499 UNLISTED E&M SERVICE: CPT | Mod: HCNC,,, | Performed by: FAMILY MEDICINE

## 2024-05-07 NOTE — PROGRESS NOTES
Wound Care Progress Note    Subjective:       Patient ID: Leanna García is a 58 y.o. female.    Chief Complaint: No chief complaint on file.    HPI  Pt seen in  clinic for a FU visit for a left foot DM ulcer. Wound is stable, no new complaints today. Getting VAC today.  Review of Systems   Skin:  Positive for wound.        Left foot DM ulcer   All other systems reviewed and are negative.      Objective:        Physical Exam  Vitals and nursing note reviewed.   Constitutional:       General: She is not in acute distress.     Appearance: Normal appearance.   Skin:     General: Skin is warm and dry.      Findings: Lesion present.      Comments: Left foot DM ulcer, see wound care assessment documentation in chart review scanned under the media tab   Neurological:      General: No focal deficit present.      Mental Status: She is alert.   Psychiatric:         Mood and Affect: Mood normal.         Judgment: Judgment normal.       There were no vitals filed for this visit.    Assessment:           ICD-10-CM ICD-9-CM   1. Ulcer of left foot with necrosis of muscle  L97.523 707.15     728.89   2. Type 2 diabetes mellitus with chronic kidney disease on chronic dialysis, with long-term current use of insulin  E11.22 250.40    N18.6 585.9    Z99.2 V45.11    Z79.4 V58.67                Plan:                  Diagnoses and all orders for this visit:    Ulcer of left foot with necrosis of muscle    Type 2 diabetes mellitus with chronic kidney disease on chronic dialysis, with long-term current use of insulin      See wound care instructions provided separately

## 2024-05-10 ENCOUNTER — OFFICE VISIT (OUTPATIENT)
Dept: WOUND CARE | Facility: HOSPITAL | Age: 59
End: 2024-05-10
Attending: FAMILY MEDICINE
Payer: MEDICARE

## 2024-05-10 VITALS
HEART RATE: 90 BPM | SYSTOLIC BLOOD PRESSURE: 170 MMHG | TEMPERATURE: 99 F | RESPIRATION RATE: 18 BRPM | DIASTOLIC BLOOD PRESSURE: 88 MMHG

## 2024-05-10 DIAGNOSIS — L97.523 ULCER OF LEFT FOOT WITH NECROSIS OF MUSCLE: Primary | ICD-10-CM

## 2024-05-10 DIAGNOSIS — Z99.2 TYPE 2 DIABETES MELLITUS WITH CHRONIC KIDNEY DISEASE ON CHRONIC DIALYSIS, WITH LONG-TERM CURRENT USE OF INSULIN: ICD-10-CM

## 2024-05-10 DIAGNOSIS — E11.641 TYPE 2 DIABETES MELLITUS WITH HYPOGLYCEMIA AND COMA, WITH LONG-TERM CURRENT USE OF INSULIN: ICD-10-CM

## 2024-05-10 DIAGNOSIS — E11.22 TYPE 2 DIABETES MELLITUS WITH CHRONIC KIDNEY DISEASE ON CHRONIC DIALYSIS, WITH LONG-TERM CURRENT USE OF INSULIN: ICD-10-CM

## 2024-05-10 DIAGNOSIS — L97.522 ULCER OF LEFT FOOT WITH FAT LAYER EXPOSED: ICD-10-CM

## 2024-05-10 DIAGNOSIS — Z79.4 TYPE 2 DIABETES MELLITUS WITH CHRONIC KIDNEY DISEASE ON CHRONIC DIALYSIS, WITH LONG-TERM CURRENT USE OF INSULIN: ICD-10-CM

## 2024-05-10 DIAGNOSIS — N18.6 TYPE 2 DIABETES MELLITUS WITH CHRONIC KIDNEY DISEASE ON CHRONIC DIALYSIS, WITH LONG-TERM CURRENT USE OF INSULIN: ICD-10-CM

## 2024-05-10 DIAGNOSIS — Z79.4 TYPE 2 DIABETES MELLITUS WITH HYPOGLYCEMIA AND COMA, WITH LONG-TERM CURRENT USE OF INSULIN: ICD-10-CM

## 2024-05-10 PROCEDURE — 11042 DBRDMT SUBQ TIS 1ST 20SQCM/<: CPT | Mod: HCNC,,, | Performed by: FAMILY MEDICINE

## 2024-05-10 PROCEDURE — 97605 NEG PRS WND THER DME<=50SQCM: CPT | Mod: HCNC,PN | Performed by: FAMILY MEDICINE

## 2024-05-10 PROCEDURE — 11042 DBRDMT SUBQ TIS 1ST 20SQCM/<: CPT | Mod: HCNC,PN | Performed by: FAMILY MEDICINE

## 2024-05-10 PROCEDURE — 99499 UNLISTED E&M SERVICE: CPT | Mod: HCNC,,, | Performed by: FAMILY MEDICINE

## 2024-05-10 NOTE — PROGRESS NOTES
Wound Care Progress Note    Subjective:       Patient ID: Leanna García is a 58 y.o. female.    Chief Complaint: No chief complaint on file.    HPI  Pt seen in clinic for a FU for a left foot DM ulcer. Pt stated wound vac stopped this morning and did not work. Pt has no other complaints today. Wound edges are more macerated today due to the VAC sponge.   Review of Systems   Skin:  Positive for wound.        DM ulcer left foot   All other systems reviewed and are negative.      Objective:        Physical Exam  Vitals and nursing note reviewed.   Constitutional:       General: She is not in acute distress.     Appearance: Normal appearance.   Skin:     General: Skin is warm and dry.      Findings: Lesion present.      Comments: Left foot DM ulcer, see wound care assessment documentation in chart review scanned under the media tab   Neurological:      General: No focal deficit present.      Mental Status: She is alert.   Psychiatric:         Mood and Affect: Mood normal.         Judgment: Judgment normal.       There were no vitals filed for this visit.    Assessment:           ICD-10-CM ICD-9-CM   1. Ulcer of left foot with necrosis of muscle  L97.523 707.15     728.89   2. Type 2 diabetes mellitus with chronic kidney disease on chronic dialysis, with long-term current use of insulin  E11.22 250.40    N18.6 585.9    Z99.2 V45.11    Z79.4 V58.67   3. Type 2 diabetes mellitus with hypoglycemia and coma, with long-term current use of insulin  E11.641 250.30    Z79.4 V58.67   4. Ulcer of left foot with fat layer exposed  L97.522 707.15                Plan:                  Diagnoses and all orders for this visit:    Ulcer of left foot with necrosis of muscle    Type 2 diabetes mellitus with chronic kidney disease on chronic dialysis, with long-term current use of insulin    Type 2 diabetes mellitus with hypoglycemia and coma, with long-term current use of insulin    Ulcer of left foot with fat layer  exposed      Please see wound care instructions which have been provided separately.

## 2024-05-14 ENCOUNTER — OFFICE VISIT (OUTPATIENT)
Dept: WOUND CARE | Facility: HOSPITAL | Age: 59
End: 2024-05-14
Attending: FAMILY MEDICINE
Payer: MEDICARE

## 2024-05-14 VITALS — RESPIRATION RATE: 19 BRPM | TEMPERATURE: 98 F

## 2024-05-14 DIAGNOSIS — Z99.2 TYPE 2 DIABETES MELLITUS WITH CHRONIC KIDNEY DISEASE ON CHRONIC DIALYSIS, WITH LONG-TERM CURRENT USE OF INSULIN: ICD-10-CM

## 2024-05-14 DIAGNOSIS — E11.621 DIABETIC ULCER OF TOE OF LEFT FOOT ASSOCIATED WITH TYPE 2 DIABETES MELLITUS, WITH NECROSIS OF MUSCLE: ICD-10-CM

## 2024-05-14 DIAGNOSIS — E11.641 TYPE 2 DIABETES MELLITUS WITH HYPOGLYCEMIA AND COMA, WITH LONG-TERM CURRENT USE OF INSULIN: ICD-10-CM

## 2024-05-14 DIAGNOSIS — N18.6 TYPE 2 DIABETES MELLITUS WITH CHRONIC KIDNEY DISEASE ON CHRONIC DIALYSIS, WITH LONG-TERM CURRENT USE OF INSULIN: ICD-10-CM

## 2024-05-14 DIAGNOSIS — L97.523 DIABETIC ULCER OF TOE OF LEFT FOOT ASSOCIATED WITH TYPE 2 DIABETES MELLITUS, WITH NECROSIS OF MUSCLE: ICD-10-CM

## 2024-05-14 DIAGNOSIS — Z79.4 TYPE 2 DIABETES MELLITUS WITH HYPOGLYCEMIA AND COMA, WITH LONG-TERM CURRENT USE OF INSULIN: ICD-10-CM

## 2024-05-14 DIAGNOSIS — L97.522 ULCER OF LEFT FOOT WITH FAT LAYER EXPOSED: ICD-10-CM

## 2024-05-14 DIAGNOSIS — Z79.4 TYPE 2 DIABETES MELLITUS WITH CHRONIC KIDNEY DISEASE ON CHRONIC DIALYSIS, WITH LONG-TERM CURRENT USE OF INSULIN: ICD-10-CM

## 2024-05-14 DIAGNOSIS — L97.523 ULCER OF LEFT FOOT WITH NECROSIS OF MUSCLE: Primary | ICD-10-CM

## 2024-05-14 DIAGNOSIS — E11.22 TYPE 2 DIABETES MELLITUS WITH CHRONIC KIDNEY DISEASE ON CHRONIC DIALYSIS, WITH LONG-TERM CURRENT USE OF INSULIN: ICD-10-CM

## 2024-05-14 PROCEDURE — 99499 UNLISTED E&M SERVICE: CPT | Mod: HCNC,,, | Performed by: FAMILY MEDICINE

## 2024-05-14 PROCEDURE — 11042 DBRDMT SUBQ TIS 1ST 20SQCM/<: CPT | Mod: HCNC,PN | Performed by: FAMILY MEDICINE

## 2024-05-14 PROCEDURE — 11042 DBRDMT SUBQ TIS 1ST 20SQCM/<: CPT | Mod: HCNC,,, | Performed by: FAMILY MEDICINE

## 2024-05-14 NOTE — PROGRESS NOTES
Wound Care Progress Note    Subjective:       Patient ID: Leanna García is a 58 y.o. female.    Chief Complaint: No chief complaint on file.    HPI  Pt seen in clinic for a FU visit for a left foot DM ulcer. Pt is getting an angiogram this week, and will need to hold the VAC. Pt has no other complaints today  Review of Systems   Skin:  Positive for wound.        Left foot DM ulcer   All other systems reviewed and are negative.      Objective:        Physical Exam  Vitals and nursing note reviewed. Exam conducted with a chaperone present.   Constitutional:       General: She is not in acute distress.     Appearance: Normal appearance.   Skin:     General: Skin is warm and dry.      Findings: Lesion present.      Comments: Left foot DM ulcer, see wound care assessment documentation in chart review scanned under the media tab   Neurological:      General: No focal deficit present.      Mental Status: She is alert.   Psychiatric:         Mood and Affect: Mood normal.         Judgment: Judgment normal.       There were no vitals filed for this visit.    Assessment:           ICD-10-CM ICD-9-CM   1. Ulcer of left foot with necrosis of muscle  L97.523 707.15     728.89   2. Type 2 diabetes mellitus with chronic kidney disease on chronic dialysis, with long-term current use of insulin  E11.22 250.40    N18.6 585.9    Z99.2 V45.11    Z79.4 V58.67   3. Type 2 diabetes mellitus with hypoglycemia and coma, with long-term current use of insulin  E11.641 250.30    Z79.4 V58.67   4. Ulcer of left foot with fat layer exposed  L97.522 707.15                Plan:                  Diagnoses and all orders for this visit:    Ulcer of left foot with necrosis of muscle    Type 2 diabetes mellitus with chronic kidney disease on chronic dialysis, with long-term current use of insulin    Type 2 diabetes mellitus with hypoglycemia and coma, with long-term current use of insulin    Ulcer of left foot with fat layer  exposed        See wound care instructions provided separately

## 2024-05-17 ENCOUNTER — OFFICE VISIT (OUTPATIENT)
Dept: WOUND CARE | Facility: HOSPITAL | Age: 59
End: 2024-05-17
Attending: FAMILY MEDICINE
Payer: MEDICARE

## 2024-05-17 VITALS
TEMPERATURE: 99 F | RESPIRATION RATE: 19 BRPM | DIASTOLIC BLOOD PRESSURE: 78 MMHG | SYSTOLIC BLOOD PRESSURE: 143 MMHG | HEART RATE: 78 BPM

## 2024-05-17 DIAGNOSIS — E11.621 DIABETIC ULCER OF OTHER PART OF LEFT FOOT ASSOCIATED WITH TYPE 2 DIABETES MELLITUS, WITH NECROSIS OF MUSCLE: ICD-10-CM

## 2024-05-17 DIAGNOSIS — E11.22 TYPE 2 DIABETES MELLITUS WITH CHRONIC KIDNEY DISEASE ON CHRONIC DIALYSIS, WITH LONG-TERM CURRENT USE OF INSULIN: ICD-10-CM

## 2024-05-17 DIAGNOSIS — Z99.2 TYPE 2 DIABETES MELLITUS WITH CHRONIC KIDNEY DISEASE ON CHRONIC DIALYSIS, WITH LONG-TERM CURRENT USE OF INSULIN: ICD-10-CM

## 2024-05-17 DIAGNOSIS — L97.523 DIABETIC ULCER OF OTHER PART OF LEFT FOOT ASSOCIATED WITH TYPE 2 DIABETES MELLITUS, WITH NECROSIS OF MUSCLE: ICD-10-CM

## 2024-05-17 DIAGNOSIS — Z79.4 TYPE 2 DIABETES MELLITUS WITH CHRONIC KIDNEY DISEASE ON CHRONIC DIALYSIS, WITH LONG-TERM CURRENT USE OF INSULIN: ICD-10-CM

## 2024-05-17 DIAGNOSIS — L97.523 ULCER OF LEFT FOOT WITH NECROSIS OF MUSCLE: Primary | ICD-10-CM

## 2024-05-17 DIAGNOSIS — S80.812D ABRASION OF LEFT LOWER EXTREMITY, SUBSEQUENT ENCOUNTER: ICD-10-CM

## 2024-05-17 DIAGNOSIS — N18.6 TYPE 2 DIABETES MELLITUS WITH CHRONIC KIDNEY DISEASE ON CHRONIC DIALYSIS, WITH LONG-TERM CURRENT USE OF INSULIN: ICD-10-CM

## 2024-05-17 PROCEDURE — 97605 NEG PRS WND THER DME<=50SQCM: CPT | Mod: HCNC,PN | Performed by: FAMILY MEDICINE

## 2024-05-17 PROCEDURE — 99499 UNLISTED E&M SERVICE: CPT | Mod: HCNC,,, | Performed by: FAMILY MEDICINE

## 2024-05-17 PROCEDURE — 11042 DBRDMT SUBQ TIS 1ST 20SQCM/<: CPT | Mod: HCNC,,, | Performed by: FAMILY MEDICINE

## 2024-05-17 PROCEDURE — 11042 DBRDMT SUBQ TIS 1ST 20SQCM/<: CPT | Mod: HCNC,PN | Performed by: FAMILY MEDICINE

## 2024-05-17 NOTE — PROGRESS NOTES
Wound Care Progress Note    Subjective:       Patient ID: Leanna García is a 58 y.o. female.    Chief Complaint: No chief complaint on file.    HPI  Pt seen in clinic for a FU visit for left foot DM ulcer. Pt now has an abrasion on the left leg from tape. Pt wound is stable, she had an angiogram yesterday and VAC was held for three days. OK to re-apply the VAC today  Review of Systems   Skin:  Positive for wound.        Left foot DM ulcer, left leg abrasion   All other systems reviewed and are negative.      Objective:        Physical Exam  Vitals and nursing note reviewed.   Constitutional:       General: She is not in acute distress.     Appearance: Normal appearance.   Skin:     General: Skin is warm and dry.      Findings: Lesion present.      Comments: Left foot DM ulcer, left leg abrasion, see wound care assessment documentation in chart review scanned under the media tab   Neurological:      General: No focal deficit present.      Mental Status: She is alert.   Psychiatric:         Thought Content: Thought content normal.         Judgment: Judgment normal.       There were no vitals filed for this visit.    Assessment:           ICD-10-CM ICD-9-CM   1. Ulcer of left foot with necrosis of muscle  L97.523 707.15     728.89   2. Type 2 diabetes mellitus with chronic kidney disease on chronic dialysis, with long-term current use of insulin  E11.22 250.40    N18.6 585.9    Z99.2 V45.11    Z79.4 V58.67   3. Abrasion of left lower extremity, subsequent encounter  S80.812D V58.89                Plan:                  Diagnoses and all orders for this visit:    Ulcer of left foot with necrosis of muscle    Type 2 diabetes mellitus with chronic kidney disease on chronic dialysis, with long-term current use of insulin    Abrasion of left lower extremity, subsequent encounter      Please see wound care instructions which have been provided separately.

## 2024-05-21 ENCOUNTER — OFFICE VISIT (OUTPATIENT)
Dept: WOUND CARE | Facility: HOSPITAL | Age: 59
End: 2024-05-21
Attending: FAMILY MEDICINE
Payer: MEDICARE

## 2024-05-21 VITALS
RESPIRATION RATE: 18 BRPM | TEMPERATURE: 98 F | HEART RATE: 80 BPM | SYSTOLIC BLOOD PRESSURE: 158 MMHG | DIASTOLIC BLOOD PRESSURE: 88 MMHG

## 2024-05-21 DIAGNOSIS — E11.621 DIABETIC ULCER OF TOE OF LEFT FOOT ASSOCIATED WITH TYPE 2 DIABETES MELLITUS, WITH NECROSIS OF MUSCLE: ICD-10-CM

## 2024-05-21 DIAGNOSIS — L97.523 ULCER OF LEFT FOOT WITH NECROSIS OF MUSCLE: Primary | ICD-10-CM

## 2024-05-21 DIAGNOSIS — L97.523 DIABETIC ULCER OF TOE OF LEFT FOOT ASSOCIATED WITH TYPE 2 DIABETES MELLITUS, WITH NECROSIS OF MUSCLE: ICD-10-CM

## 2024-05-21 DIAGNOSIS — S80.812D ABRASION OF LEFT LOWER EXTREMITY, SUBSEQUENT ENCOUNTER: ICD-10-CM

## 2024-05-21 PROBLEM — S80.812A ABRASION OF LEFT LEG: Status: ACTIVE | Noted: 2024-05-21

## 2024-05-21 PROCEDURE — 99499 UNLISTED E&M SERVICE: CPT | Mod: HCNC,,, | Performed by: FAMILY MEDICINE

## 2024-05-21 PROCEDURE — 11042 DBRDMT SUBQ TIS 1ST 20SQCM/<: CPT | Mod: HCNC,,, | Performed by: FAMILY MEDICINE

## 2024-05-21 PROCEDURE — 3066F NEPHROPATHY DOC TX: CPT | Mod: HCNC,CPTII,, | Performed by: FAMILY MEDICINE

## 2024-05-21 PROCEDURE — 1159F MED LIST DOCD IN RCRD: CPT | Mod: HCNC,CPTII,, | Performed by: FAMILY MEDICINE

## 2024-05-21 PROCEDURE — 1160F RVW MEDS BY RX/DR IN RCRD: CPT | Mod: HCNC,CPTII,, | Performed by: FAMILY MEDICINE

## 2024-05-21 PROCEDURE — 11042 DBRDMT SUBQ TIS 1ST 20SQCM/<: CPT | Mod: HCNC,PN | Performed by: FAMILY MEDICINE

## 2024-05-21 PROCEDURE — 3044F HG A1C LEVEL LT 7.0%: CPT | Mod: HCNC,CPTII,, | Performed by: FAMILY MEDICINE

## 2024-05-21 PROCEDURE — 4010F ACE/ARB THERAPY RXD/TAKEN: CPT | Mod: HCNC,CPTII,, | Performed by: FAMILY MEDICINE

## 2024-05-21 PROCEDURE — 97605 NEG PRS WND THER DME<=50SQCM: CPT | Mod: HCNC,PN | Performed by: FAMILY MEDICINE

## 2024-05-21 RX ORDER — DOXYCYCLINE 100 MG/1
100 CAPSULE ORAL 2 TIMES DAILY
Qty: 20 CAPSULE | Refills: 0 | Status: SHIPPED | OUTPATIENT
Start: 2024-05-21 | End: 2024-05-31

## 2024-05-21 NOTE — PROGRESS NOTES
Wound Care Progress Note    Subjective:       Patient ID: Leanna García is a 58 y.o. female.    Chief Complaint: No chief complaint on file.    HPI  Pt seen in clinic for a FU visit for left foot DM ulcer and abrasion. Pt wound has a foul odor, and doxycycline generally helps her. Pt has no other complaints today  Review of Systems   Skin:  Positive for wound.        Open wound left foot and leg   All other systems reviewed and are negative.      Objective:        Physical Exam  Vitals and nursing note reviewed.   Constitutional:       General: She is not in acute distress.     Appearance: Normal appearance.   Skin:     General: Skin is warm and dry.      Findings: Lesion present.      Comments: Left foot DM ulcer, left leg abrasion, see wound care assessment documentation in chart review scanned under the media tab   Neurological:      General: No focal deficit present.      Mental Status: She is alert.   Psychiatric:         Mood and Affect: Mood normal.         Thought Content: Thought content normal.         Judgment: Judgment normal.       There were no vitals filed for this visit.    Assessment:           ICD-10-CM ICD-9-CM   1. Ulcer of left foot with necrosis of muscle  L97.523 707.15     728.89   2. Abrasion of left lower extremity, subsequent encounter  S80.812D V58.89                Plan:                  Diagnoses and all orders for this visit:    Ulcer of left foot with necrosis of muscle    Abrasion of left lower extremity, subsequent encounter    Other orders  -     doxycycline (VIBRAMYCIN) 100 MG Cap; Take 1 capsule (100 mg total) by mouth 2 (two) times daily. for 10 days        See wound care instructions which have been provided separately.

## 2024-06-11 NOTE — ASSESSMENT & PLAN NOTE
PEA arrest during induction for his decortication.  Likely medication effect from anesthesia.    Appears to not have any neurologic sequela from the event.     Stories are inconsistent, patient reports being alert but incoherent during episode.  Denies syncope although initial reports report loss of consciousness.    Orthostatic vitals unremarkable  CT head: No acute intracranial abnormalities. Chronic findings as discussed above.     Carotid US ordered   ECHO done 1/22/2024    Left Ventricle: The left ventricle is normal in size. Mildly increased wall thickness. There is mild concentric hypertrophy. Normal wall motion. There is normal systolic function with a visually estimated ejection fraction of 60 - 65%. There is normal diastolic function.    Right Ventricle: Normal right ventricular cavity size. Wall thickness is normal. Right ventricle wall motion  is normal. Systolic function is normal.    Aortic Valve: There is mild aortic valve sclerosis.    Mitral Valve: There is mild regurgitation with a centrally directed jet.    IVC/SVC: Normal venous pressure at 3 mmHg.    Pericardium: There is a trivial posterior effusion. No indication of cardiac tamponade.    Fall precautions in place  Seizure precautions in place    Carotid ultrasounds showed no significant stenosis, possible discharge home tomorrow if remained stable after dialysis

## 2024-06-14 ENCOUNTER — TELEPHONE (OUTPATIENT)
Dept: CARDIOLOGY | Facility: CLINIC | Age: 59
End: 2024-06-14
Payer: MEDICARE

## 2024-06-14 NOTE — PATIENT INSTRUCTIONS
Much of the documentation for this visit was completed in wound docs system. Please see the attached documentation for further details about the patients care. Scanned under the media tab.     
Statement Selected

## 2024-06-14 NOTE — TELEPHONE ENCOUNTER
----- Message from Trish Osorio sent at 6/14/2024  3:38 PM CDT -----  Contact: Self  Type: Needs Medical Advice  Who Called:  Patient  Best Call Back Number: [phone]  Additional Information: Patient is calling in regards to an upcoming surgery she has scheduled for her foot, stated she was just informed today that she needs this clearance and is calling to see if Dr Adame is able to provide this without being seen. Thank You    Stated this is needing to go to her Podiatrist Dr Tigre Perry at fax # [phone]. They can be reached at [phone].

## 2024-06-17 ENCOUNTER — TELEPHONE (OUTPATIENT)
Dept: CARDIOLOGY | Facility: CLINIC | Age: 59
End: 2024-06-17
Payer: MEDICARE

## 2024-06-17 NOTE — LETTER
2024    Leanna García  [address]  [place] [address]             [facility] [place] [facility] [place]  [address]  [address]  [facility] [address]  Phone: [phone]  Fax: [phone] Patient: Leanna García  : 1965  Referring Doctor: dr carr  Type of procedure: foot surgery    Current Outpatient Medications   Medication Sig    acetaminophen (TYLENOL) 325 MG tablet Take 325 mg by mouth every 6 (six) hours.    amLODIPine (NORVASC) 5 MG tablet Take 1 tablet by mouth once daily.    aspirin (ECOTRIN) 81 MG EC tablet Take 1 tablet (81 mg total) by mouth once daily.    atorvastatin (LIPITOR) 80 MG tablet Take 1 tablet (80 mg total) by mouth once daily.    blood sugar diagnostic Strp To check BG 4 times daily, to use with insurance preferred meter    blood-glucose meter kit To check BG 3 times daily, to use with insurance preferred meter    cetirizine (ZYRTEC) 10 MG tablet Take 1 tablet (10 mg total) by mouth every other day.    cinacalcet (SENSIPAR) 60 MG Tab Take 2 tablets by mouth 2 (two) times daily with meals.    clopidogreL (PLAVIX) 75 mg tablet Take 1 tablet (75 mg total) by mouth once daily.    collagenase (SANTYL) ointment Apply topically once daily.    doxycycline (MONODOX) 100 MG capsule TAKE 1 CAPSULE BY MOUTH TWICE DAILY FOR 14 DAYS    epoetin chris-epbx (RETACRIT) 4,000 unit/mL injection Inject 1.11 mLs (4,440 Units total) into the skin every Mon, Wed, Fri.    gabapentin (NEURONTIN) 100 MG capsule Take 1 capsule (100 mg total) by mouth 2 (two) times daily.    lancets Misc To check BG 1 times daily, to use with insurance preferred meter    lancets Misc To check BG 3 times daily, to use with insurance preferred meter    minoxidiL (LONITEN) 2.5 MG tablet Take 2 tablets by mouth 2 (two) times daily.    multivitamin (THERAGRAN) per tablet Take 1 tablet by mouth once daily.    nitroGLYCERIN (NITROSTAT) 0.4 MG SL tablet Place 1  "tablet (0.4 mg total) under the tongue every 5 (five) minutes as needed for Chest pain. (Patient not taking: Reported on 1/25/2024)    ondansetron (ZOFRAN-ODT) 4 MG TbDL Take 1 tablet (4 mg total) by mouth every 8 (eight) hours as needed (nausea/vomiting). (Patient not taking: Reported on 3/21/2024)    OZEMPIC 0.25 mg or 0.5 mg (2 mg/3 mL) pen injector INJECT 0.25 MG SUBCUTANEOUSLY ONCE A WEEK (Patient not taking: Reported on 3/21/2024)    pantoprazole (PROTONIX) 40 MG tablet Take 1 tablet (40 mg total) by mouth once daily.    pen needle, diabetic (BD ULTRA-FINE ROBERT PEN NEEDLE) 32 gauge x 5/32" Ndle 1 pen by Misc.(Non-Drug; Combo Route) route 4 (four) times daily. To use 4 times per day with insulin injections.    sucroferric oxyhydroxide (VELPHORO) 500 mg Chew Take 2 tablets by mouth 3 (three) times daily.     No current facility-administered medications for this visit.       This patient has been assessed for risk factors for clearance of surgery with the following stipulations:    ___ No contraindications    __x_ Recommendations for antiplatelet/anticoagulant medications: ok stop  plavix for _______ days prior & asa for _______ days prior    __x_ Cleared for surgery with ______ low risk, ______ moderate risk, or _____ high risk    ___ Not cleared for surgery due to the following reasons:      If you have any questions regarding the above, please contact my office at [phone]    Sincerely,           "

## 2024-06-17 NOTE — TELEPHONE ENCOUNTER
Spoke to mrs. Ram. Told her that Dr Adame is out of the office. He will return tomorrow. We will ask him about the cardiac clearance

## 2024-06-17 NOTE — TELEPHONE ENCOUNTER
"----- Message from Abelardo Higuera sent at 6/17/2024  9:12 AM CDT -----  Regarding: clearance  Contact: Leanna at [phone]  Type: Needs Medical Advice    Who Called:  Leanna    Best Call Back Number: [phone] / PLEASE DO NOT SEND PORTAL MSG ONLY CALL PT    Additional Information:   PT is having surgery with her Podiatrist Dr Tigre Perry on 6/20/24 at [facility].    Portal msg sent 6/14 states "You have an appt for 6/27 with dr hay. We can discuss clearance then". Please note pt needs clearance sent no later than EOD TODAY 6/17/24 or the very latest 6/18/24 before 10:00. They need clearance faxed to [phone]. Dr Perry's office can be reached at [phone].  "

## 2024-06-18 NOTE — TELEPHONE ENCOUNTER
----- Message from Tal Vera Patient Care Assistant sent at 6/18/2024 10:11 AM CDT -----  Contact: Dr. Perry/Keli  Type: Needs Medical Advice    Who Called: Dr. Perry Office/Keli Stout Call Back Number: [phone]  Fax: [phone]  Inquiry/Question: Keli is calling to get cardiac clearance prior to the pt's appt on Thursday 06/20/24. Please advise Thank you~

## 2024-08-16 NOTE — TELEPHONE ENCOUNTER
Problem: Ineffective Coping  Goal: Participates in unit activities  Description: Interventions:  - Provide therapeutic environment   - Provide required programming   - Redirect inappropriate behaviors   Outcome: Progressing   Patient out for groups and openly shares and has been working with therapist on steps.    Spoke to pt and walked her through the [employer] virtual visit process. Pt stated she would make sure cortney is downloaded on her smartphone and her username and password are working. Pt stated she wanted to call back tomorrow to schedule a visit. Offered to get pt scheduled for a future date and pt stated she would like to call back.

## 2024-10-02 ENCOUNTER — TELEPHONE (OUTPATIENT)
Dept: FAMILY MEDICINE | Facility: CLINIC | Age: 59
End: 2024-10-02
Payer: MEDICARE

## 2024-10-02 NOTE — TELEPHONE ENCOUNTER
Sandhya Yanes Nebo Staff     ----- Message from Sandhya sent at 10/2/2024 11:53 AM CDT -----  Contact: self  Type: Sooner Apoointment Request      Name of Caller:pt    When is the first available appointment?05/02/25    Symptoms:check up/ needs pw filled out by  for wound dr before 10/22. Wants an appt around 10am.  Please call.      Best Call Back Number: [phone]      Additional Information: pt st that she can only be seen on Tuesday & Thursdays. Would like a morning appt. please call to discuss.

## 2024-10-02 NOTE — TELEPHONE ENCOUNTER
Appointment scheduled for the date of 10/15/24. Patient agreed to appointment date, time, and location.

## 2024-10-02 NOTE — TELEPHONE ENCOUNTER
Bria Muñoz Staff     ----- Message from Bria sent at 10/2/2024  8:48 AM CDT -----  Regarding: appt  Type:  Sooner Apoointment Request      Name of Caller:pt    When is the first available appointment?05/02/25    Symptoms:check up/ needs pw filled out by  for wound dr.      Best Call Back Number:[phone]      Additional Information: pt st that she can only be seen on Tuesday & Thursdays. Would like a morning appt.  please call to discuss.

## 2024-10-15 ENCOUNTER — OFFICE VISIT (OUTPATIENT)
Dept: FAMILY MEDICINE | Facility: CLINIC | Age: 59
End: 2024-10-15
Payer: MEDICARE

## 2024-10-15 VITALS
OXYGEN SATURATION: 96 % | BODY MASS INDEX: 26.93 KG/M2 | HEART RATE: 80 BPM | HEIGHT: 67 IN | DIASTOLIC BLOOD PRESSURE: 60 MMHG | SYSTOLIC BLOOD PRESSURE: 130 MMHG | TEMPERATURE: 98 F | RESPIRATION RATE: 16 BRPM

## 2024-10-15 DIAGNOSIS — I15.2 HYPERTENSION ASSOCIATED WITH DIABETES: Chronic | ICD-10-CM

## 2024-10-15 DIAGNOSIS — Z99.2 TYPE 2 DIABETES MELLITUS WITH CHRONIC KIDNEY DISEASE ON CHRONIC DIALYSIS, WITH LONG-TERM CURRENT USE OF INSULIN: ICD-10-CM

## 2024-10-15 DIAGNOSIS — N18.6 TYPE 2 DIABETES MELLITUS WITH CHRONIC KIDNEY DISEASE ON CHRONIC DIALYSIS, WITH LONG-TERM CURRENT USE OF INSULIN: ICD-10-CM

## 2024-10-15 DIAGNOSIS — J44.9 CHRONIC OBSTRUCTIVE PULMONARY DISEASE, UNSPECIFIED COPD TYPE: ICD-10-CM

## 2024-10-15 DIAGNOSIS — E11.22 TYPE 2 DIABETES MELLITUS WITH CHRONIC KIDNEY DISEASE ON CHRONIC DIALYSIS, WITH LONG-TERM CURRENT USE OF INSULIN: ICD-10-CM

## 2024-10-15 DIAGNOSIS — N18.6 ESRD (END STAGE RENAL DISEASE): Primary | ICD-10-CM

## 2024-10-15 DIAGNOSIS — E11.69 HYPERLIPIDEMIA ASSOCIATED WITH TYPE 2 DIABETES MELLITUS: ICD-10-CM

## 2024-10-15 DIAGNOSIS — Z86.73 HISTORY OF TIA (TRANSIENT ISCHEMIC ATTACK): ICD-10-CM

## 2024-10-15 DIAGNOSIS — J32.9 CHRONIC SINUSITIS, UNSPECIFIED LOCATION: ICD-10-CM

## 2024-10-15 DIAGNOSIS — Z79.4 TYPE 2 DIABETES MELLITUS WITH CHRONIC KIDNEY DISEASE ON CHRONIC DIALYSIS, WITH LONG-TERM CURRENT USE OF INSULIN: ICD-10-CM

## 2024-10-15 DIAGNOSIS — E11.59 HYPERTENSION ASSOCIATED WITH DIABETES: Chronic | ICD-10-CM

## 2024-10-15 DIAGNOSIS — E78.5 HYPERLIPIDEMIA ASSOCIATED WITH TYPE 2 DIABETES MELLITUS: ICD-10-CM

## 2024-10-15 DIAGNOSIS — T14.8XXA SKIN ABRASION: ICD-10-CM

## 2024-10-15 PROCEDURE — 99999 PR PBB SHADOW E&M-EST. PATIENT-LVL V: CPT | Mod: PBBFAC,HCNC,, | Performed by: FAMILY MEDICINE

## 2024-10-15 RX ORDER — LANCETS
EACH MISCELLANEOUS
Qty: 200 EACH | Refills: 11 | Status: SHIPPED | OUTPATIENT
Start: 2024-10-15

## 2024-10-15 RX ORDER — CLOPIDOGREL BISULFATE 75 MG/1
75 TABLET ORAL DAILY
Qty: 90 TABLET | Refills: 3 | Status: SHIPPED | OUTPATIENT
Start: 2024-10-15 | End: 2025-10-15

## 2024-10-15 RX ORDER — FLUTICASONE PROPIONATE 50 MCG
1 SPRAY, SUSPENSION (ML) NASAL DAILY
Qty: 16 G | Refills: 3 | Status: SHIPPED | OUTPATIENT
Start: 2024-10-15 | End: 2025-10-15

## 2024-10-15 RX ORDER — INSULIN PUMP SYRINGE, 3 ML
EACH MISCELLANEOUS
Qty: 1 EACH | Refills: 0 | Status: SHIPPED | OUTPATIENT
Start: 2024-10-15 | End: 2025-10-15

## 2024-10-15 RX ORDER — SILVER SULFADIAZINE 10 G/1000G
CREAM TOPICAL 2 TIMES DAILY
Qty: 25 G | Refills: 0 | Status: SHIPPED | OUTPATIENT
Start: 2024-10-15 | End: 2024-10-29

## 2024-10-15 RX ORDER — ATORVASTATIN CALCIUM 80 MG/1
80 TABLET, FILM COATED ORAL DAILY
Qty: 90 TABLET | Refills: 3 | Status: SHIPPED | OUTPATIENT
Start: 2024-10-15 | End: 2025-10-15

## 2024-10-15 NOTE — PROGRESS NOTES
Subjective:   Patient ID: Leanna García is a 59 y.o. female     Chief Complaint:Follow-up      Here for checkup    Follow-up  Pertinent negatives include no abdominal pain or chest pain.     Review of Systems   Respiratory:  Negative for shortness of breath.    Cardiovascular:  Negative for chest pain.   Gastrointestinal:  Negative for abdominal pain.   Genitourinary:  Negative for dysuria.     Past Medical History:   Diagnosis Date    A-fib     resolved per patient    Anemia due to end stage renal disease 6/30/2022    Arthritis     Bronchitis     Cardiovascular event risk, ASCVD 10-year risk 6.7% 10/15/2016    Diabetes mellitus type II     Diabetic foot infection     Diabetic ulcer of left midfoot 05/05/2022    Disorder of kidney and ureter     Encounter for blood transfusion     ESRD (end stage renal disease) 05/18/2018    MANJINDER (generalized anxiety disorder) 10/25/2016    History of colon polyps 11/02/2016    Hyperlipidemia     Hypertension     Stroke      Past Surgical History:   Procedure Laterality Date    ANGIOGRAPHY OF LOWER EXTREMITY Left 10/18/2022    Procedure: Angiogram Extremity Unilateral;  Surgeon: Ali Khoobehi, MD;  Location: [facility] CATH/EP LAB;  Service: Vascular;  Laterality: Left;    AV FISTULA PLACEMENT Left 08/29/2022    Procedure: CREATION, AV FISTULA;  Surgeon: Ali Khoobehi, MD;  Location: [facility] OR;  Service: Vascular;  Laterality: Left;    BONE BIOPSY Left 08/24/2022    Procedure: Biopsy-Bone;  Surgeon: Porfirio Ponce DPM;  Location: [facility] OR;  Service: Podiatry;  Laterality: Left;    COLONOSCOPY N/A 10/05/2016    Procedure: COLONOSCOPY;  Surgeon: Pillo Chanel MD;  Location: [facility] ENDO;  Service: Endoscopy;  Laterality: N/A;    COLONOSCOPY N/A 04/26/2022    Procedure: COLONOSCOPY;  Surgeon: Saravanan Rachel MD;  Location: [facility] ENDO;  Service: Endoscopy;  Laterality: N/A;    FISTULOGRAM Left 08/24/2022    Procedure: Fistulogram;  Surgeon: Ali Khoobehi, MD;  Location: [facility] CATH/EP  LAB;  Service: Vascular;  Laterality: Left;    FISTULOGRAM Left 01/31/2024    Procedure: Fistulogram;  Surgeon: Khoobehi, Ali, MD;  Location: [facility] CATH/EP LAB;  Service: Vascular;  Laterality: Left;    FOOT SURGERY Left     HERNIA REPAIR Bilateral 11/22/2016    inguinal    HYSTERECTOMY      INCISION AND DRAINAGE FOOT Left 05/13/2022    Procedure: INCISION AND DRAINAGE, FOOT;  Surgeon: Ricardo Bruno DPM;  Location: [facility] OR;  Service: Podiatry;  Laterality: Left;    OOPHORECTOMY      PERCUTANEOUS TRANSLUMINAL ANGIOPLASTY OF ARTERIOVENOUS FISTULA Left 01/31/2024    Procedure: PTA, AV FISTULA;  Surgeon: Khoobehi, Ali, MD;  Location: [facility] CATH/EP LAB;  Service: Vascular;  Laterality: Left;    THROMBECTOMY, AV FISTULA, UPPER EXTREMITY, PERCUTANEOUS  08/24/2022    Procedure: THROMBECTOMY, AV FISTULA, UPPER EXTREMITY, PERCUTANEOUS;  Surgeon: Ali Khoobehi, MD;  Location: [facility] CATH/EP LAB;  Service: Vascular;;    TOE AMPUTATION Left 08/26/2022    Procedure: AMPUTATION, TOE;  Surgeon: Porfirio Ponce DPM;  Location: [facility] OR;  Service: Podiatry;  Laterality: Left;     Objective:     Vitals:    10/15/24 1207   BP: 130/60   Pulse: 80   Resp: 16   Temp: 98.2 °F (36.8 °C)     Body mass index is 26.93 kg/m².  Physical Exam  Vitals and nursing note reviewed.   Constitutional:       Appearance: She is well-developed.   HENT:      Head: Normocephalic and atraumatic.   Eyes:      General: No scleral icterus.     Conjunctiva/sclera: Conjunctivae normal.   Cardiovascular:      Heart sounds: No murmur heard.  Pulmonary:      Effort: Pulmonary effort is normal. No respiratory distress.   Musculoskeletal:         General: No deformity. Normal range of motion.      Cervical back: Normal range of motion and neck supple.   Skin:     Coloration: Skin is not pale.      Findings: No rash.   Neurological:      Mental Status: She is alert and oriented to person, place, and time.   Psychiatric:         Behavior: Behavior normal.         Thought  Content: Thought content normal.         Judgment: Judgment normal.       Assessment:     1. ESRD (end stage renal disease)    2. Skin abrasion    3. Type 2 diabetes mellitus with chronic kidney disease on chronic dialysis, with long-term current use of insulin    4. Hyperlipidemia associated with type 2 diabetes mellitus    5. History of TIA (transient ischemic attack)    6. Chronic sinusitis, unspecified location    7. Hypertension associated with diabetes    8. Chronic obstructive pulmonary disease, unspecified COPD type      Plan:   ESRD (end stage renal disease)  -     Comprehensive Metabolic Panel; Future; Expected date: 04/15/2025    Skin abrasion  -     silver sulfADIAZINE 1% (SILVADENE) 1 % cream; Apply topically 2 (two) times daily. for 14 days  Dispense: 25 g; Refill: 0    Type 2 diabetes mellitus with chronic kidney disease on chronic dialysis, with long-term current use of insulin  -     blood-glucose meter kit; To check BG 1 times daily, to use with insurance preferred meter  Dispense: 1 each; Refill: 0  -     lancets Misc; To check BG 1 times daily, to use with insurance preferred meter  Dispense: 200 each; Refill: 11  -     blood sugar diagnostic Strp; To check BG 1 times daily, to use with insurance preferred meter  Dispense: 200 strip; Refill: 11  -     Comprehensive Metabolic Panel; Future; Expected date: 04/15/2025  -     Hemoglobin A1C; Future; Expected date: 04/15/2025  -     Lipid Panel; Future; Expected date: 04/15/2025  -     CBC Auto Differential; Future; Expected date: 04/15/2025    Hyperlipidemia associated with type 2 diabetes mellitus  -     atorvastatin (LIPITOR) 80 MG tablet; Take 1 tablet (80 mg total) by mouth once daily.  Dispense: 90 tablet; Refill: 3    History of TIA (transient ischemic attack)  -     atorvastatin (LIPITOR) 80 MG tablet; Take 1 tablet (80 mg total) by mouth once daily.  Dispense: 90 tablet; Refill: 3  -     clopidogreL (PLAVIX) 75 mg tablet; Take 1 tablet (75 mg  total) by mouth once daily.  Dispense: 90 tablet; Refill: 3    Chronic sinusitis, unspecified location  -     fluticasone propionate (FLONASE) 50 mcg/actuation nasal spray; 1 spray (50 mcg total) by Each Nostril route once daily.  Dispense: 16 g; Refill: 3    Hypertension associated with diabetes  -     Comprehensive Metabolic Panel; Future; Expected date: 04/15/2025    Chronic obstructive pulmonary disease, unspecified COPD type  -     Comprehensive Metabolic Panel; Future; Expected date: 04/15/2025            Total time spent of Greater than 30 minutes minutes on the day of the visit.This includes face to face time and preparing to see the patient, obtaining and reviewing separately obtained history, documenting clinical information in the electronic or other health record, independently interpreting results and communicating results to the patient/family/caregiver, or care coordinator.    Established patient with me has been instructed that must see me at least 1 time yearly (every 365 days) for refills of medications. Seeing other providers in this clinic is fine but expectation is to see me yearly.    Stefano Lopez MD  10/15/2024    Portions of this note have been dictated with BRIAN Perdomo

## 2024-10-23 ENCOUNTER — TELEPHONE (OUTPATIENT)
Dept: FAMILY MEDICINE | Facility: CLINIC | Age: 59
End: 2024-10-23
Payer: MEDICARE

## 2024-10-23 NOTE — TELEPHONE ENCOUNTER
Ashlie Underwood Stefano Staff     ----- Message from Ashlie sent at 10/23/2024 11:29 AM CDT -----  Regarding: advice  Contact: patient  Type: Needs Medical Advice  Who Called:  Patient   Symptoms (please be specific):    How long has patient had these symptoms:    Pharmacy name and phone #:    Best Call Back Number: [phone] (home)     Additional Information: Patient states please fax paperwork to Dr. Perry Attn: Wendy fax # [phone].  Please call patient to advise.  Thanks!

## 2024-10-23 NOTE — TELEPHONE ENCOUNTER
Patient states she submitted forms for completion at last office visit on 10-15-24.  Patient requesting to have forms faxed to her wound care doctor (Dr. Perry).  Fax number is located in attached message below.

## 2024-10-24 ENCOUNTER — TELEPHONE (OUTPATIENT)
Dept: FAMILY MEDICINE | Facility: CLINIC | Age: 59
End: 2024-10-24
Payer: MEDICARE

## 2024-10-24 NOTE — TELEPHONE ENCOUNTER
LOV--10-15-24  Vicky (nurse) with [facility] reports the following:    --Patient's cough is worse, producing clear phlegm.  Diminished breath sounds. Taking sugar free Day Tussin, Flonase inhaler, and Mucinex.     -- Patient has severe back pain, Tylenol providing little relief.  Hx DDD. Missed dialysis due to back pain.    --Anxiety due to stressful home situation and current medical issues.  Having crying spells; this is affecting blood pressure.

## 2024-10-24 NOTE — TELEPHONE ENCOUNTER
Colette Reyes Staff     ----- Message from Colette sent at 10/24/2024 12:06 PM CDT -----  Regarding: advise  Contact: [facility]  Type: Needs Medical Advice  Who Called:  [facility] gabrielle   Symptoms (please be specific):    How long has patient had these symptoms:    Pharmacy name and phone #:    Best Call Back Number: [phone]  Additional Information: anxiety with crying spells cough is worse since last visit on the 15th and has back pain that was bad enough that she missed dialysis  apt yesterday tylenol not relieving pain  Please call home health nurse

## 2024-10-24 NOTE — TELEPHONE ENCOUNTER
"Follow up call placed to Vicky with [facility] for notification of provider's reply.  Mrs. Perry states patient is not having difficulty breathing.  States missed dialysis due to back pain.  Mrs. Perry stated patient "won't go to the emergency room."    "

## 2024-10-25 ENCOUNTER — TELEPHONE (OUTPATIENT)
Dept: FAMILY MEDICINE | Facility: CLINIC | Age: 59
End: 2024-10-25
Payer: MEDICARE

## 2024-10-25 NOTE — TELEPHONE ENCOUNTER
----- Message from Julienne sent at 10/25/2024  1:58 PM CDT -----  Type:  Patient Returning Call    Who Called:  henny  Who Left Message for Patient:  maddie  Does the patient know what this is regarding?:  no  Best Call Back Number:  [phone]  Additional Information:

## 2024-10-25 NOTE — TELEPHONE ENCOUNTER
Insoles have been ordered. Needs an appointment for all other complaints. Can notify nurse this is our policy going forward. If she has a complaint it is better to call and schedule an appointment. If it is an emergent complaint direct to ER

## 2024-10-25 NOTE — TELEPHONE ENCOUNTER
----- Message from Sandhya sent at 10/25/2024 12:19 PM CDT -----  Contact: self  Type:  Needs Medical Advice    Who Called: self  Symptoms (please be specific): pt is needing to speak to nurse regarding getting insoles and shoes for foot. Pt [facility]  never faxed paperwork back to Dr. Tigre Perry. Pt also [facility] nurse is trying to apollo medications for her back, cough, and anxiety. Please call pt for more info.   Would the patient rather a call back or a response via [facility]? call  Best Call Back Number: [phone] (home)     Additional Information: please advise and thank you

## 2024-10-30 ENCOUNTER — TELEPHONE (OUTPATIENT)
Dept: FAMILY MEDICINE | Facility: CLINIC | Age: 59
End: 2024-10-30
Payer: MEDICARE

## 2024-10-30 PROBLEM — Z99.2 ESRD (END STAGE RENAL DISEASE) ON DIALYSIS: Status: ACTIVE | Noted: 2024-10-30

## 2024-10-30 PROBLEM — I73.9 PVD (PERIPHERAL VASCULAR DISEASE): Status: ACTIVE | Noted: 2024-10-30

## 2024-10-30 PROBLEM — N18.6 ESRD (END STAGE RENAL DISEASE) ON DIALYSIS: Status: ACTIVE | Noted: 2024-10-30

## 2024-10-30 PROBLEM — E11.9 T2DM (TYPE 2 DIABETES MELLITUS): Status: ACTIVE | Noted: 2024-10-30

## 2024-10-30 PROBLEM — I50.9 ACUTE DECOMPENSATED HEART FAILURE: Status: ACTIVE | Noted: 2024-10-30

## 2024-10-31 PROBLEM — G47.30 SLEEP APNEA: Status: ACTIVE | Noted: 2024-10-31

## 2024-11-01 ENCOUNTER — PATIENT OUTREACH (OUTPATIENT)
Dept: ADMINISTRATIVE | Facility: CLINIC | Age: 59
End: 2024-11-01
Payer: MEDICARE

## 2024-11-01 ENCOUNTER — PATIENT MESSAGE (OUTPATIENT)
Dept: ADMINISTRATIVE | Facility: CLINIC | Age: 59
End: 2024-11-01
Payer: MEDICARE

## 2024-11-04 RX ORDER — NITROGLYCERIN 0.4 MG/1
0.4 TABLET SUBLINGUAL EVERY 5 MIN PRN
Qty: 30 TABLET | Refills: 0 | Status: SHIPPED | OUTPATIENT
Start: 2024-11-04 | End: 2024-12-04

## 2024-11-04 NOTE — TELEPHONE ENCOUNTER
Care Due:                  Date            Visit Type   Department     Provider  --------------------------------------------------------------------------------                                EP -                              PRIMARY      [facility]  Last Visit: 10-      CARE ([facility])   ESTEBAN Lopez                              EP -                              PRIMARY      [facility]  Next Visit: 10-      CARE ([facility])   ESTEBAN Lopez                                                            Last  Test          Frequency    Reason                     Performed    Due Date  --------------------------------------------------------------------------------    Lipid Panel.  12 months..  atorvastatin.............  01- 01-    [facility] Embedded Care Due Messages. Reference number: [id number].   11/04/2024 9:04:25 AM CST

## 2024-11-04 NOTE — TELEPHONE ENCOUNTER
Call placed to number indicated in the attached message below.  Spoke to Vicky (nurse) with [facility] (original message states name of nurse is Janki this information is incorrect).  Vicky advised patient was discharged from hospital on 10/31/24, \[facility]\"" patient received changes to medication upon hospital discharge but only agrees to medication change from Plavix to Eliquis.  [facility] patient is awaiting approval from nephrologist prior to making other medication changes from hospital discharge.  Patient has existing hospital follow up appointment scheduled for the date of 11/8/24 with JANETT Ross.  Patient has dialysis on 11/8/24 and needs to change appointment per home health nurse's report.  Call placed to patient at contact numbers [phone] (voicemail full) and [phone] (left voicemail). No answer received. Left message requesting return call to office.  Patient needs refill of Nitroglycerin.  Will forward refill request to Dr. Lopez at this time.

## 2024-11-04 NOTE — TELEPHONE ENCOUNTER
----- Message from Razia sent at 11/4/2024  8:33 AM CST -----  Regarding: call back  Type: Patient Call Back    Who called: Shelley Shearer[facility]     What is the request in detail: pt discharged from hospital 10/31, does not want to change medication until approved by nephrologist; pt also needs new rx for nitroGLYCERIN (NITROSTAT) 0.4 MG SL tablet sent to [facility] [address]  [address] [facility]   Phone: [phone]       Can the clinic reply by [facility]?no    Would the patient rather a call back or a response via [facility]? call    Best call back number: home health [phone]    Additional Information:

## 2024-11-05 ENCOUNTER — TELEPHONE (OUTPATIENT)
Dept: FAMILY MEDICINE | Facility: CLINIC | Age: 59
End: 2024-11-05
Payer: MEDICARE

## 2024-11-06 ENCOUNTER — TELEPHONE (OUTPATIENT)
Dept: FAMILY MEDICINE | Facility: CLINIC | Age: 59
End: 2024-11-06
Payer: MEDICARE

## 2024-11-06 NOTE — TELEPHONE ENCOUNTER
Spoke w/Wendy at [facility]/Dr. Perry's office. They are still not receiving sent fax in spite of several attempts. Provided new fax # stating the problem in transmission is on their end. Faxed to [phone] as requested.

## 2024-11-06 NOTE — TELEPHONE ENCOUNTER
Carol Ann Ochoa Stefano Staff     ----- Message from Carol Ann sent at 11/6/2024  1:33 PM CST -----  Type:  Needs Medical Advice    Who Called: pt    Symptoms (please be specific): na     How long has patient had these symptoms:  na    Pharmacy name and phone #:  na    Would the patient rather a call back or a response via [facility]? Call back    Best Call Back Number: [phone]      Additional Information: pt is calling to check on the atatus of two forms that the office was suppose to send over to wound care for her. Dr Perry fax # [phone]     Please call Back to advise. Thanks!

## 2024-11-06 NOTE — TELEPHONE ENCOUNTER
Re-faxed Statement of Certifying Physician as patient reporting that form wasn't received. LEONARDO mcconnell/Wendy at Dr. Perry's office to ensure form received.

## 2024-11-08 ENCOUNTER — TELEPHONE (OUTPATIENT)
Dept: FAMILY MEDICINE | Facility: CLINIC | Age: 59
End: 2024-11-08
Payer: MEDICARE

## 2024-11-08 NOTE — TELEPHONE ENCOUNTER
----- Message from Colette sent at 11/8/2024 10:42 AM CST -----  Regarding: call back  Contact: pt  Type: Needs Medical Advice  Who Called:  patient  Symptoms (please be specific):    How long has patient had these symptoms:    Pharmacy name and phone #:    Best Call Back Number: [phone]  '  Additional Information: kirsten auguste pt is still waiting on shoes and update on paperwork are u available to advise MRN: [id number] henny sawyer     States she has been waiting on a call back since 10/15/24  [phone] fax

## 2024-11-08 NOTE — TELEPHONE ENCOUNTER
Spoke to pt via phone, pt verbalized understanding. Pt had no further questions at this time. Pt is coming to  paperwork

## 2024-11-08 NOTE — TELEPHONE ENCOUNTER
Call regarding paperwork to be faxed to Dr. Perry's office.  Please refer to clinical notes dated 11/6/24 per YEN Rosas. Communicated this information to AMARI Rodriguez.

## 2024-11-08 NOTE — TELEPHONE ENCOUNTER
Jennifer Rodriguez MA Yarbrough, Donna M., LPN     ----- Message from Med Assistant Rodriguez sent at 11/8/2024  1:09 PM CST -----  Regarding: FW: call back  Contact: pt  Hi  I think this pt may wanted to speak to you :)  ----- Message -----  From: Colette Reyes  Sent: 11/8/2024  10:45 AM CST  To: John Agarwal Staff  Subject: call back                                        Type: Needs Medical Advice  Who Called:  patient  Symptoms (please be specific):    How long has patient had these symptoms:    Pharmacy name and phone #:    Best Call Back Number: [phone]  '  Additional Information: kirsten auguste pt is still waiting on shoes and update on paperwork are u available to advise MRN: [id number] henny sawyer     States she has been waiting on a call back since 10/15/24  [phone] fax

## 2024-11-19 ENCOUNTER — OUTPATIENT CASE MANAGEMENT (OUTPATIENT)
Dept: ADMINISTRATIVE | Facility: OTHER | Age: 59
End: 2024-11-19
Payer: MEDICARE

## 2024-12-05 ENCOUNTER — DOCUMENT SCAN (OUTPATIENT)
Dept: HOME HEALTH SERVICES | Facility: HOSPITAL | Age: 59
End: 2024-12-05
Payer: MEDICARE

## 2025-01-02 ENCOUNTER — OFFICE VISIT (OUTPATIENT)
Dept: CARDIOLOGY | Facility: CLINIC | Age: 60
End: 2025-01-02
Payer: MEDICARE

## 2025-01-02 DIAGNOSIS — E78.00 PURE HYPERCHOLESTEROLEMIA: ICD-10-CM

## 2025-01-02 DIAGNOSIS — I73.9 PVD (PERIPHERAL VASCULAR DISEASE): ICD-10-CM

## 2025-01-02 DIAGNOSIS — N18.6 ESRD (END STAGE RENAL DISEASE): Primary | ICD-10-CM

## 2025-01-02 DIAGNOSIS — E11.59 HYPERTENSION ASSOCIATED WITH DIABETES: ICD-10-CM

## 2025-01-02 DIAGNOSIS — I48.91 ATRIAL FIBRILLATION, UNSPECIFIED TYPE: ICD-10-CM

## 2025-01-02 DIAGNOSIS — I50.32 CHRONIC DIASTOLIC HEART FAILURE: ICD-10-CM

## 2025-01-02 DIAGNOSIS — I15.2 HYPERTENSION ASSOCIATED WITH DIABETES: ICD-10-CM

## 2025-01-02 PROCEDURE — 99999 PR PBB SHADOW E&M-EST. PATIENT-LVL III: CPT | Mod: PBBFAC,HCNC,, | Performed by: NURSE PRACTITIONER

## 2025-01-02 RX ORDER — BUSPIRONE HYDROCHLORIDE 5 MG/1
5 TABLET ORAL 2 TIMES DAILY
COMMUNITY

## 2025-01-02 RX ORDER — ONDANSETRON 4 MG/1
TABLET, ORALLY DISINTEGRATING ORAL
COMMUNITY

## 2025-01-02 RX ORDER — PANTOPRAZOLE SODIUM 40 MG/1
1 TABLET, DELAYED RELEASE ORAL DAILY
COMMUNITY

## 2025-01-02 RX ORDER — SILVER SULFADIAZINE 10 G/1000G
CREAM TOPICAL
COMMUNITY

## 2025-01-02 NOTE — PROGRESS NOTES
Subjective:    Patient ID:  Leanna García is a 59 y.o. female who presents for follow-up       HPI:  Leanna García is here for follow-up visit. Denies chest pain or shortness of breath. Denies recent fever cough chills or congestion. Denies blood in the urine or blood in the stool.  Denies myalgias. Denies orthopnea or peripheral edema. Denies nausea vomiting or dyspepsia. No recent arm neck or jaw pain. No lightheadedness or dizziness.     Attends her dialysis MWF. Reports one episode of shortness of breath and went to the ED. Symptoms improved after inpatient HD session. Reports her dialysis unit has since increased her UF.    Recent right foot surgery with I/D wound care.        Review of patient's allergies indicates:   Allergen Reactions    Dilaudid [hydromorphone] Nausea And Vomiting    Chlorhexidine Itching    Lortab [hydrocodone-acetaminophen] Itching       Past Medical History:   Diagnosis Date    A-fib     resolved per patient    Anemia due to end stage renal disease 6/30/2022    Arthritis     Bronchitis     Cardiovascular event risk, ASCVD 10-year risk 6.7% 10/15/2016    Diabetes mellitus type II     Diabetic foot infection     Diabetic ulcer of left midfoot 05/05/2022    Disorder of kidney and ureter     Encounter for blood transfusion     ESRD (end stage renal disease) 05/18/2018    MANJINDER (generalized anxiety disorder) 10/25/2016    History of colon polyps 11/02/2016    Hyperlipidemia     Hypertension     Stroke      Past Surgical History:   Procedure Laterality Date    ANGIOGRAPHY OF LOWER EXTREMITY Left 10/18/2022    Procedure: Angiogram Extremity Unilateral;  Surgeon: Ali Khoobehi, MD;  Location: [facility] CATH/EP LAB;  Service: Vascular;  Laterality: Left;    AV FISTULA PLACEMENT Left 08/29/2022    Procedure: CREATION, AV FISTULA;  Surgeon: Ali Khoobehi, MD;  Location: [facility] OR;  Service: Vascular;  Laterality: Left;    BONE BIOPSY Left 08/24/2022    Procedure: Biopsy-Bone;  Surgeon: Porfirio PERRY  MARTINEZ Ponce;  Location: [facility] OR;  Service: Podiatry;  Laterality: Left;    COLONOSCOPY N/A 10/05/2016    Procedure: COLONOSCOPY;  Surgeon: Pillo Chanel MD;  Location: [facility] ENDO;  Service: Endoscopy;  Laterality: N/A;    COLONOSCOPY N/A 04/26/2022    Procedure: COLONOSCOPY;  Surgeon: Saravanan Rachel MD;  Location: [facility] ENDO;  Service: Endoscopy;  Laterality: N/A;    FISTULOGRAM Left 08/24/2022    Procedure: Fistulogram;  Surgeon: Ali Khoobehi, MD;  Location: [facility] CATH/EP LAB;  Service: Vascular;  Laterality: Left;    FISTULOGRAM Left 01/31/2024    Procedure: Fistulogram;  Surgeon: Khoobehi, Ali, MD;  Location: [facility] CATH/EP LAB;  Service: Vascular;  Laterality: Left;    FOOT SURGERY Left     HERNIA REPAIR Bilateral 11/22/2016    inguinal    HYSTERECTOMY      INCISION AND DRAINAGE FOOT Left 05/13/2022    Procedure: INCISION AND DRAINAGE, FOOT;  Surgeon: Ricardo Bruno DPM;  Location: [facility] OR;  Service: Podiatry;  Laterality: Left;    OOPHORECTOMY      PERCUTANEOUS TRANSLUMINAL ANGIOPLASTY OF ARTERIOVENOUS FISTULA Left 01/31/2024    Procedure: PTA, AV FISTULA;  Surgeon: Khoobehi, Ali, MD;  Location: [facility] CATH/EP LAB;  Service: Vascular;  Laterality: Left;    THROMBECTOMY, AV FISTULA, UPPER EXTREMITY, PERCUTANEOUS  08/24/2022    Procedure: THROMBECTOMY, AV FISTULA, UPPER EXTREMITY, PERCUTANEOUS;  Surgeon: Ali Khoobehi, MD;  Location: [facility] CATH/EP LAB;  Service: Vascular;;    TOE AMPUTATION Left 08/26/2022    Procedure: AMPUTATION, TOE;  Surgeon: Porfirio Ponce DPM;  Location: [facility] OR;  Service: Podiatry;  Laterality: Left;     Social History     Tobacco Use    Smoking status: Never    Smokeless tobacco: Never   Substance Use Topics    Alcohol use: No    Drug use: No     Family History   Problem Relation Name Age of Onset    Hyperlipidemia Mother      Hypertension Mother      Hypertension Father      Diabetes Father      Diabetes Sister      Diabetes Sister      Diabetes Maternal Aunt      Diabetes Maternal  Grandmother      Heart disease Maternal Grandmother      Cancer Paternal Grandmother      Breast cancer Neg Hx      Colon cancer Neg Hx      Ovarian cancer Neg Hx          Review of Systems:   See HPI         Objective:        There were no vitals filed for this visit.    Lab Results   Component Value Date    WBC 11.57 10/31/2024    HGB 13 12/31/2024    HCT 30.0 (L) 10/31/2024     10/31/2024    CHOL 174 01/29/2024    TRIG 419 (H) 01/29/2024    HDL 39 (L) 01/29/2024    ALT 14 10/31/2024    AST 17 10/31/2024     10/31/2024    K 5.8 (H) 10/31/2024    CL 95 10/31/2024    CREATININE 9.2 (H) 10/31/2024    BUN 42 (H) 12/04/2024    CO2 20 (L) 10/31/2024    TSH 5.000 11/03/2022    INR 0.9 01/29/2024    HGBA1C 7.8 (H) 10/16/2024    MICROALBUR 13.3 08/12/2014        ECHOCARDIOGRAM RESULTS  Results for orders placed during the hospital encounter of 10/30/24    Echo    Interpretation Summary    Left Ventricle: The left ventricle is mildly dilated. Mildly increased wall thickness. Regional wall motion abnormalities present. See diagram for wall motion findings. There is normal systolic function with a visually estimated ejection fraction of 55%. There is indeterminate diastolic function.    Left Atrium: Left atrium is moderately dilated.    Aortic Valve: Moderately calcified cusps. Mildly restricted motion. There is mild stenosis. Aortic valve area by VTI is 0.7 cm². Aortic valve peak velocity is 2.6 m/s. Mean gradient is 13.6 mmHg. The dimensionless index is 0.43.    Mitral Valve: There is moderate mitral annular calcification present. There is normal leaflet mobility. There is mild stenosis. The mean pressure gradient across the mitral valve is 4 mmHg at a heart rate of  bpm.  The estimated mitral valve area by direct planimetry is 3.3 cm2. There is at least moderate regurgitation.    Right Ventricle: The right ventricular cavity is at the upper limits of normal in size. Systolic function is normal.    Right  Atrium: Right atrium is mildly dilated.    Tricuspid Valve: There is mild to moderate regurgitation.    Pulmonic Valve: There is mild to moderate regurgitation.    Pulmonary Artery: The estimated pulmonary artery systolic pressure is 60 mmHg.    IVC/SVC: Elevated venous pressure at 15 mmHg.    Aorta: Ascending aorta is mildly dilated measuring 3.7 cm.        CURRENT/PREVIOUS VISIT EKG  Results for orders placed or performed during the hospital encounter of 10/30/24   EKG 12-lead    Collection Time: 10/31/24  1:49 AM   Result Value Ref Range    QRS Duration 86 ms    OHS QTC Calculation 470 ms    Narrative    Test Reason : R07.9,    Vent. Rate : 079 BPM     Atrial Rate : 079 BPM     P-R Int : 136 ms          QRS Dur : 086 ms      QT Int : 410 ms       P-R-T Axes : 038 -03 159 degrees     QTc Int : 470 ms    Normal sinus rhythm  ST and T wave abnormality, consider lateral ischemia  Abnormal ECG  When compared with ECG of 31-OCT-2024 01:33,  No significant change was found  Confirmed by Cynthia ZUNIGA, Julio RAMIREZ ([id number]) on 11/1/2024 10:04:58 AM    Referred By: [email or web address]   SELF           Confirmed By:Julio Marie MD     No valid procedures specified.   Results for orders placed during the hospital encounter of 01/22/24    Nuclear Stress Test    Interpretation Summary    The ECG portion of the study is negative for ischemia.    The patient reported no chest pain during the stress test.    There were no arrhythmias during stress.    The nuclear portion of this study will be reported separately.      Physical Exam:  CONSTITUTIONAL: No fever, no chills  HEENT: Normocephalic, atraumatic              NECK:  No JVD, no carotid bruit  CVS: S1S2+, RRR, 2/6 systolic murmur.   LUNGS: Clear  ABDOMEN: Soft, NT, BS+, no bruit  EXTREMITIES: No cyanosis, edema. Pulses intact  : No saldivar catheter  NEURO: AAO X 3  PSY: Normal affect    AVF with good pulse/ thrill.    Medication List with Changes/Refills   Current Medications    ACCU-CHEK  GUIDE GLUCOSE METER MISC    USE TO CHECK BLOOD GLUCOSE ONCE DAILY    ACETAMINOPHEN (TYLENOL) 325 MG TABLET    Take 325 mg by mouth every 6 (six) hours.    AMLODIPINE (NORVASC) 5 MG TABLET    Take 1 tablet (5 mg total) by mouth once daily.    ASPIRIN (ECOTRIN) 81 MG EC TABLET    Take 1 tablet (81 mg total) by mouth once daily.    ATORVASTATIN (LIPITOR) 80 MG TABLET    Take 1 tablet (80 mg total) by mouth once daily.    BLOOD SUGAR DIAGNOSTIC STRP    To check BG 4 times daily, to use with insurance preferred meter    BLOOD SUGAR DIAGNOSTIC STRP    To check BG 1 times daily, to use with insurance preferred meter    BUSPIRONE (BUSPAR) 5 MG TAB    Take 5 mg by mouth 2 (two) times daily.    CETIRIZINE (ZYRTEC) 10 MG TABLET    Take 1 tablet (10 mg total) by mouth every other day.    CINACALCET (SENSIPAR) 60 MG TAB    Take 2 tablets by mouth 2 (two) times daily with meals.    CINNAMON BARK (CINNAMON) 500 MG CAPSULE    Take 500 mg by mouth 3 (three) times daily. WITH MEALS    FLUTICASONE PROPIONATE (FLONASE) 50 MCG/ACTUATION NASAL SPRAY    1 spray (50 mcg total) by Each Nostril route once daily.    IRON SUCROSE IN NS (VENOFER) 100 MG/100 ML PGBK    50 mg.    LANCETS MISC    To check BG 1 times daily, to use with insurance preferred meter    LANCETS MISC    To check BG 3 times daily, to use with insurance preferred meter    LANCETS MISC    To check BG 1 times daily, to use with insurance preferred meter    LOSARTAN (COZAAR) 100 MG TABLET    Take 1 tablet (100 mg total) by mouth once daily.    METOPROLOL TARTRATE (LOPRESSOR) 25 MG TABLET    Take 0.5 tablets (12.5 mg total) by mouth 2 (two) times daily.    MINOXIDIL (LONITEN) 10 MG TAB    Take 5 mg by mouth 2 (two) times daily.    MULTIVITAMIN (THERAGRAN) PER TABLET    Take 1 tablet by mouth once daily.    NITROGLYCERIN (NITROSTAT) 0.4 MG SL TABLET    Place 1 tablet (0.4 mg total) under the tongue every 5 (five) minutes as needed for Chest pain.    ONDANSETRON (ZOFRAN-ODT) 4 MG  "TBDL    Place 1 tablet 3 times a day by translingual route as needed.    PANTOPRAZOLE (PROTONIX) 40 MG TABLET    Take 1 tablet by mouth once daily.    PEN NEEDLE, DIABETIC (BD ULTRA-FINE ROBERT PEN NEEDLE) 32 GAUGE X 5/32" NDLE    1 pen by Misc.(Non-Drug; Combo Route) route 4 (four) times daily. To use 4 times per day with insulin injections.    SILVER SULFADIAZINE 1% (SSD) 1 % CREAM    APPLY CREAM EXTERNALLY TO AFFECTED AREA TWICE DAILY FOR 14 DAYS    SUCROFERRIC OXYHYDROXIDE (VELPHORO) 500 MG CHEW    Take 500 mg by mouth 3 (three) times daily.    UNABLE TO FIND    medication name: Linezolid/Ciprofloxacin/Itraconazole 9%/8%/2% In MCT Powder [15398]   Changed and/or Refilled Medications    Modified Medication Previous Medication    APIXABAN (ELIQUIS) 5 MG TAB apixaban (ELIQUIS) 5 mg Tab       Take 1 tablet (5 mg total) by mouth 2 (two) times daily.    Take 1 tablet (5 mg total) by mouth 2 (two) times daily.             Assessment:       1. ESRD (end stage renal disease)    2. Hypertension associated with diabetes    3. Chronic diastolic heart failure    4. PVD (peripheral vascular disease)    5. Atrial fibrillation, unspecified type    6. Pure hypercholesterolemia         Plan:     Problem List Items Addressed This Visit       Hypertension associated with diabetes (Chronic)    Overview     BP slight elevated today due to eating potato chips to elevate BP after HD  No BP med change today  Continue Minoxidil, amlodipine         Current Assessment & Plan     BP with some elevation in clinic today  HD planned for tomorrow  Ok to have permissive hypertension on non-dialysis days  Continue amlodipine 5 mg, losartan 100 mg, and Lopressor 12.5 mg BID  Low sodium diet         Chronic diastolic heart failure    Current Assessment & Plan     Echo 10/31/2024  Left Ventricle: The left ventricle is mildly dilated. Mildly increased wall thickness. Regional wall motion abnormalities present. See diagram for wall motion findings. " There is normal systolic function with a visually estimated ejection fraction of 55%. There is indeterminate diastolic function.    Left Atrium: Left atrium is moderately dilated.    Aortic Valve: Moderately calcified cusps. Mildly restricted motion. There is mild stenosis. Aortic valve area by VTI is 0.7 cm². Aortic valve peak velocity is 2.6 m/s. Mean gradient is 13.6 mmHg. The dimensionless index is 0.43.    Mitral Valve: There is moderate mitral annular calcification present. There is normal leaflet mobility. There is mild stenosis. The mean pressure gradient across the mitral valve is 4 mmHg at a heart rate of  bpm.  The estimated mitral valve area by direct planimetry is 3.3 cm2. There is at least moderate regurgitation.    Right Ventricle: The right ventricular cavity is at the upper limits of normal in size. Systolic function is normal.    Right Atrium: Right atrium is mildly dilated.    Tricuspid Valve: There is mild to moderate regurgitation.    Pulmonic Valve: There is mild to moderate regurgitation.    Pulmonary Artery: The estimated pulmonary artery systolic pressure is 60 mmHg.    IVC/SVC: Elevated venous pressure at 15 mmHg.    Aorta: Ascending aorta is mildly dilated measuring 3.7 cm.    Soft systolic murmur noted on physical exam  On Lopressor  Continue routine monitoring         Hyperlipemia    Current Assessment & Plan     Continue Lipitor and low fat diet         ESRD (end stage renal disease) - Primary    Current Assessment & Plan     HD on MWF         Atrial fibrillation, unspecified type    Current Assessment & Plan     Continue Eliquis 5 mg BID and Lopressor  EKG in October 2024 showed SR  HR regular rate and rhythm per physical exam today         Relevant Medications    apixaban (ELIQUIS) 5 mg Tab    PVD (peripheral vascular disease)    Current Assessment & Plan     Continue statin and aspirin therapy            Follow up in about 3 months (around 4/2/2025).       JENSEN Ybarra-ACNP,  CVNP-BC

## 2025-01-02 NOTE — ASSESSMENT & PLAN NOTE
BP with some elevation in clinic today  HD planned for tomorrow  Ok to have permissive hypertension on non-dialysis days  Continue amlodipine 5 mg, losartan 100 mg, and Lopressor 12.5 mg BID  Low sodium diet

## 2025-01-02 NOTE — ASSESSMENT & PLAN NOTE
Echo 10/31/2024  Left Ventricle: The left ventricle is mildly dilated. Mildly increased wall thickness. Regional wall motion abnormalities present. See diagram for wall motion findings. There is normal systolic function with a visually estimated ejection fraction of 55%. There is indeterminate diastolic function.    Left Atrium: Left atrium is moderately dilated.    Aortic Valve: Moderately calcified cusps. Mildly restricted motion. There is mild stenosis. Aortic valve area by VTI is 0.7 cm². Aortic valve peak velocity is 2.6 m/s. Mean gradient is 13.6 mmHg. The dimensionless index is 0.43.    Mitral Valve: There is moderate mitral annular calcification present. There is normal leaflet mobility. There is mild stenosis. The mean pressure gradient across the mitral valve is 4 mmHg at a heart rate of  bpm.  The estimated mitral valve area by direct planimetry is 3.3 cm2. There is at least moderate regurgitation.    Right Ventricle: The right ventricular cavity is at the upper limits of normal in size. Systolic function is normal.    Right Atrium: Right atrium is mildly dilated.    Tricuspid Valve: There is mild to moderate regurgitation.    Pulmonic Valve: There is mild to moderate regurgitation.    Pulmonary Artery: The estimated pulmonary artery systolic pressure is 60 mmHg.    IVC/SVC: Elevated venous pressure at 15 mmHg.    Aorta: Ascending aorta is mildly dilated measuring 3.7 cm.    Soft systolic murmur noted on physical exam  On Lopressor  Continue routine monitoring

## 2025-01-02 NOTE — ASSESSMENT & PLAN NOTE
Continue Eliquis 5 mg BID and Lopressor  EKG in October 2024 showed SR  HR regular rate and rhythm per physical exam today

## 2025-01-13 DIAGNOSIS — Z00.00 ENCOUNTER FOR MEDICARE ANNUAL WELLNESS EXAM: ICD-10-CM

## 2025-01-27 ENCOUNTER — TELEPHONE (OUTPATIENT)
Dept: PHARMACY | Facility: CLINIC | Age: 60
End: 2025-01-27
Payer: MEDICARE

## 2025-01-27 ENCOUNTER — TELEPHONE (OUTPATIENT)
Dept: CARDIOLOGY | Facility: CLINIC | Age: 60
End: 2025-01-27
Payer: MEDICARE

## 2025-01-27 DIAGNOSIS — I48.91 ATRIAL FIBRILLATION, UNSPECIFIED TYPE: Primary | ICD-10-CM

## 2025-01-27 NOTE — LETTER
January 27, 2025    Leanna García  [address] [place] [address]       Dear Ms. García        My name is Hayley Sanders.  I am reaching out on behalf of [facility]s Pharmacy Patient Assistance Team after receiving a referral from your Provider inquiring about assistance with your Eliquis. Unfortunately, The Pharmacy Patient Assistance Team is unable to submit your application at this time due to the following reasons       [facility]  Patient Assistance Program guidelines state, patient must demonstrate she is ineligible for Medicare's Low Income Subsidy(LIS)/ Extra Help Program. Apply by calling [phone] or online @ https://secure.[facility].gov/i1020/start.  Please provide a copy of  the determination letter to assigned advocate Hayleykajal Sanders ASAP for application submission     [facility]  Patient Assistance Program guidelines state patient must demonstrate she has spent a minimum 3% of her household income on prescriptions for the current year.        Patient may be eligible for a Medicare Prescription Payment Plan. Please contact your insurance company for enrollment details.        Sincerely  Hayley TILLMAN @[phone]  Pharmacy Patient Assistance  [address] [facility] [address]  [place] [address]  Fax: [phone]  [email or web address]

## 2025-01-27 NOTE — TELEPHONE ENCOUNTER
[facility]  Patient Assistance Program guidelines state patient must demonstrate she is ineligible for Medicare's Low Income Subsidy(LIS)/ Extra Help Program. Patient can apply by calling [phone] or online @ https://secure.[facility].gov/i1020/start.  Please provide a copy of  the determination letter to assigned advocate Hayley Sanders ASAP for application submission     [facility]  Patient Assistance Program guidelines state, patient must demonstrate she has spent a minimum 3% of her household income on prescriptions for the current year.        Patient may be eligible for a Medicare Prescription Payment Plan. Patient has been advised to contact her insurance company for enrollment details.           Sincerely   Hayley Sanders  Pharmacy Patient Assistance Team

## 2025-01-27 NOTE — TELEPHONE ENCOUNTER
----- Message from Keli Lea NP sent at 1/25/2025  1:45 PM CST -----  Can we have her fill out paper etc for help  ----- Message -----  From: Jamir Ferrera MA  Sent: 1/24/2025  11:55 AM CST  To: Keli Lea NP    Patient is requesting an alternate Rx because it 's to high  ----- Message -----  From: Yoon Sanchez  Sent: 1/24/2025  10:55 AM CST  To: [facility] Staff     Type:  Needs Medical Advice    Who Called:  PT    Would the patient rather a call back or a response via [facility]? Call  Best Call Back Number: [phone]        Additional Information:  states rx is to expensive Disp Refills Start End   apixaban (ELIQUIS) 5 mg Tab 60 tablet 2 1/2/2025 4/2/2025   Sig - Route: Take 1 tablet (5 mg total) by mouth 2 (two) times daily. - Oral   Sent to pharmacy as: apixaban (ELIQUIS) 5 mg Tab   And wants to  go back to old rx    Please call back to advise. Thank you!

## 2025-02-04 ENCOUNTER — TELEPHONE (OUTPATIENT)
Dept: CARDIOLOGY | Facility: CLINIC | Age: 60
End: 2025-02-04
Payer: MEDICARE

## 2025-02-04 NOTE — TELEPHONE ENCOUNTER
----- Message from Archie sent at 2/3/2025  3:48 PM CST -----  Regarding: appt  Contact: LELA DEL CASTILLO [[id number]]  Type:  Sooner Appointment Request    Caller is requesting a sooner appointment.      Name of Caller:  Lela    When is the first available appointment?  3/18    Symptoms:  Heart rate jumping at dialysis    Would the patient rather a call back or a response via [facility]? Call    Best Call Back Number:  [phone] (home)     Additional Information:  Please call to advise.

## 2025-02-06 ENCOUNTER — TELEPHONE (OUTPATIENT)
Dept: PHARMACY | Facility: CLINIC | Age: 60
End: 2025-02-06
Payer: MEDICARE

## 2025-02-06 ENCOUNTER — OFFICE VISIT (OUTPATIENT)
Dept: CARDIOLOGY | Facility: CLINIC | Age: 60
End: 2025-02-06
Payer: MEDICARE

## 2025-02-06 VITALS
DIASTOLIC BLOOD PRESSURE: 70 MMHG | HEART RATE: 37 BPM | HEIGHT: 67 IN | SYSTOLIC BLOOD PRESSURE: 120 MMHG | OXYGEN SATURATION: 99 % | BODY MASS INDEX: 28.18 KG/M2

## 2025-02-06 DIAGNOSIS — I48.91 ATRIAL FIBRILLATION, UNSPECIFIED TYPE: Primary | ICD-10-CM

## 2025-02-06 DIAGNOSIS — N18.6 ESRD (END STAGE RENAL DISEASE): ICD-10-CM

## 2025-02-06 DIAGNOSIS — E78.00 PURE HYPERCHOLESTEROLEMIA: ICD-10-CM

## 2025-02-06 DIAGNOSIS — I50.32 CHRONIC DIASTOLIC HEART FAILURE: ICD-10-CM

## 2025-02-06 DIAGNOSIS — I73.9 PVD (PERIPHERAL VASCULAR DISEASE): ICD-10-CM

## 2025-02-06 DIAGNOSIS — R00.1 BRADYCARDIA: ICD-10-CM

## 2025-02-06 DIAGNOSIS — R07.9 CHEST PAIN, UNSPECIFIED TYPE: ICD-10-CM

## 2025-02-06 PROCEDURE — 99999 PR PBB SHADOW E&M-EST. PATIENT-LVL V: CPT | Mod: PBBFAC,HCNC,,

## 2025-02-06 PROCEDURE — 3008F BODY MASS INDEX DOCD: CPT | Mod: HCNC,CPTII,S$GLB,

## 2025-02-06 PROCEDURE — 1159F MED LIST DOCD IN RCRD: CPT | Mod: HCNC,CPTII,S$GLB,

## 2025-02-06 PROCEDURE — 3051F HG A1C>EQUAL 7.0%<8.0%: CPT | Mod: HCNC,CPTII,S$GLB,

## 2025-02-06 PROCEDURE — 93005 ELECTROCARDIOGRAM TRACING: CPT | Mod: HCNC,S$GLB,,

## 2025-02-06 PROCEDURE — 3074F SYST BP LT 130 MM HG: CPT | Mod: HCNC,CPTII,S$GLB,

## 2025-02-06 PROCEDURE — 3078F DIAST BP <80 MM HG: CPT | Mod: HCNC,CPTII,S$GLB,

## 2025-02-06 PROCEDURE — 93010 ELECTROCARDIOGRAM REPORT: CPT | Mod: HCNC,S$GLB,, | Performed by: GENERAL PRACTICE

## 2025-02-06 PROCEDURE — 99214 OFFICE O/P EST MOD 30 MIN: CPT | Mod: HCNC,S$GLB,,

## 2025-02-06 RX ORDER — ASPIRIN 81 MG/1
81 TABLET ORAL DAILY
Qty: 90 TABLET | Refills: 3 | Status: SHIPPED | OUTPATIENT
Start: 2025-02-06 | End: 2026-02-01

## 2025-02-06 RX ORDER — OMEGA-3 FATTY ACIDS 1000 MG
1 CAPSULE ORAL DAILY
Qty: 90 CAPSULE | Refills: 3 | Status: SHIPPED | OUTPATIENT
Start: 2025-02-06 | End: 2026-02-06

## 2025-02-06 NOTE — ASSESSMENT & PLAN NOTE
Last lipid panel:     Latest Reference Range & Units 01/29/24 14:54   Cholesterol Total 120 - 199 mg/dL 174   HDL 40 - 75 mg/dL 39 (L)   HDL/Cholesterol Ratio 20.0 - 50.0 % 22.4   Non-HDL Cholesterol mg/dL 135   Total Cholesterol/HDL Ratio 2.0 - 5.0  4.5   Triglycerides 30 - 150 mg/dL 419 (H)   LDL Cholesterol 63.0 - 159.0 mg/dL Invalid, Trig>400.0   (L): Data is abnormally low  (H): Data is abnormally high    Continue Lipitor 80 mg daily and add omega 3 fatty fish oil supps. Encouraged heart healthy low fat low cholesterol diet and exercise as tolerated.

## 2025-02-06 NOTE — ASSESSMENT & PLAN NOTE
Denies any chest pains or anginal equivalents at this time.  Continue with RF modification to include statin, asa, and omega 3 fatty fish oils. BB on hold.

## 2025-02-06 NOTE — PROGRESS NOTES
1/27/25 10:10 AM  Note  [facility]  Patient Assistance Program guidelines state patient must demonstrate she is ineligible for Medicare's Low Income Subsidy(LIS)/ Extra Help Program. Patient can apply by calling [phone] or online @ https://secure.[employer].gov/i1020/start.  Please provide a copy of  the determination letter to assigned advocate Hayley Sanders ASAP for application submission      [facility]  Patient Assistance Program guidelines state, patient must demonstrate she has spent a minimum 3% of her household income on prescriptions for the current year.        Patient may be eligible for a Medicare Prescription Payment Plan. Patient has been advised to contact her insurance company for enrollment details.             Sincerely   Hayley Sanders  Pharmacy Patient Assistance Team

## 2025-02-06 NOTE — ASSESSMENT & PLAN NOTE
Currently on Eliquis 5 mg BID - denies any bleeding tendencies.  Will hold Lopressor for now due to bradycardia and will further evaluate with a 7 day Zio monitor.

## 2025-02-06 NOTE — ASSESSMENT & PLAN NOTE
Last ECHO noted intermediate diastolic dysfunction.  BB currently on hold due to bradycardia. On HD - clinically euvolemic. Continue with routine monitoring.

## 2025-02-06 NOTE — ASSESSMENT & PLAN NOTE
Name: Monie Moraes      : 1991      MRN: [id number]  Encounter Provider: KARTIK Hargrove  Encounter Date: 2024   Encounter department: [facility]  :  Assessment & Plan  Constipation, unspecified constipation type  History of chronic constipation.  Reports stools are daily but are hard to pass at times.  Denies any melena hematochezia.  Does see some mucus with her stool.  She has tried over-the-counter stool softeners and MiraLAX 1 capful daily with no help.  He is a good water drinker.  Trying to increase her fiber in her diet.  Will trial Linzess 145 mcg daily.  Instructed patient to contact office in 2 weeks with update.    Orders:  •  linaCLOtide (Linzess) 145 MCG CAPS; Take 1 capsule (145 mcg total) by mouth in the morning  -High-fiber diet (written letter given in office today)  -Follow-up in office in 3 to 4 months or sooner if needed  -Contact office in 2 weeks with 1 how Linzess is working.  Gastroesophageal reflux disease without esophagitis  Heartburn/reflux controlled with omeprazole 20 mg daily.  Denies any nausea, vomiting or dysphagia.  Will have her try to stop her PPI and see if symptoms return.    -Stop PPI         History of Present Illness   HPI  Monie Moraes is a 33 y.o. female who presents for follow-up.  She was last seen by Becca Stone PA-C  for GERD and constipation.    History of chronic constipation.  Reports stools are daily but are hard to pass at times.  Denies any melena hematochezia.  Does see some mucus with her stool.  She has tried over-the-counter stool softeners and MiraLAX 1 capful daily with no help.  He is a good water drinker.  Trying to increase her fiber in her diet.    Heartburn/reflux controlled with omeprazole 20 mg daily.  Denies any nausea, vomiting or dysphagia.    Obstructive series  noted moderate amount of stool burden.  No evidence of bowel obstruction.    CT  Will further assess with 7 day Zio monitor.  Hold BB at this time.     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    Labs 10/24-CMP normal other than chloride 109, glucose 103, CBC normal, hemoglobin A1c 5.9, TSH 1.3    Prior EGD/colonoscopy    Never had an EGD or colonoscopy      History obtained from: patient    Review of Systems  Medical History Reviewed by provider this encounter:  Tobacco  Allergies  Meds  Problems  Med Hx  Surg Hx  Fam Hx     .  Current Outpatient Medications on File Prior to Visit   Medication Sig Dispense Refill   • DULoxetine (CYMBALTA) 30 mg delayed release capsule Take 30 mg by mouth daily     • fremanezumab-vfrm (Ajovy) 225 MG/1.5ML auto-injector Inject 1.5 mL (225 mg total) under the skin every 30 (thirty) days 1.5 mL 11   • lurasidone (LATUDA) 40 mg tablet Take 40 mg by mouth in the morning     • Magnesium 400 MG TABS Take 1 tablet (400 mg total) by mouth in the morning 90 tablet 3   • medroxyPROGESTERone (DEPO-PROVERA) 150 mg/mL injection Inject 1 mL (150 mg total) into a muscle every 3 (three) months 1 mL 3   • mirtazapine (REMERON) 15 mg tablet      • omeprazole (PriLOSEC) 20 mg delayed release capsule TAKE 1 CAPSULE(20 MG) BY MOUTH TWICE DAILY 180 capsule 1   • propranolol (INDERAL LA) 80 mg 24 hr capsule TAKE 1 CAPSULE(80 MG) BY MOUTH DAILY 90 capsule 1   • Ubrogepant (Ubrelvy) 100 MG tablet Take 1 tablet (100 mg) one time as needed for migraine. May repeat one additional tablet (100 mg) at least two hours after the first dose. Do not use more than two doses per day, or for more than eight days per month. 9 tablet 11   • ibuprofen (MOTRIN) 600 mg tablet Take 1 tablet (600 mg total) by mouth every 6 (six) hours as needed for mild pain (Patient not taking: Reported on 9/10/2024) 30 tablet 0   • polyethylene glycol (GLYCOLAX) 17 GM/SCOOP powder Take 17 g by mouth daily (Patient not taking: Reported on 11/13/2024) 850 g 0     No current facility-administered  "medications on file prior to visit.      Social History     Tobacco Use   • Smoking status: Every Day     Current packs/day: 1.00     Average packs/day: 1 pack/day for 11.0 years (11.0 ttl pk-yrs)     Types: Cigarettes     Passive exposure: Current   • Smokeless tobacco: Never   Vaping Use   • Vaping status: Never Used   Substance and Sexual Activity   • Alcohol use: Yes     Comment: couple times/month   • Drug use: Never   • Sexual activity: Not Currently     Partners: Male     Birth control/protection: Injection        Objective   /70 (BP Location: Left arm)   Pulse (!) 109   Temp 97.8 °F (36.6 °C)   Ht 5' 5\" (1.651 m)   Wt 54.3 kg (119 lb 12.8 oz)   BMI 19.94 kg/m²      Physical Exam      "

## 2025-02-10 ENCOUNTER — TELEPHONE (OUTPATIENT)
Dept: CARDIOLOGY | Facility: CLINIC | Age: 60
End: 2025-02-10
Payer: MEDICARE

## 2025-02-10 NOTE — TELEPHONE ENCOUNTER
Spoke with pt she states that she was running low on her Eliquis 5mg but then when I checked her medication list shows a Rx was sent on 1/2/2025 then the pt stated that the medication cost $300 I gave the pt a number to call for Eliquis program [phone].

## 2025-02-10 NOTE — TELEPHONE ENCOUNTER
----- Message from Kristy sent at 2/10/2025 11:56 AM CST -----  Contact: Patient  Type:  Needs Medical Advice    Who Called: Patient      Pharmacy name and phone #:    [facility] [address]  [place] [facility] [address] [facility]  [address]  [place] [address]  Phone: [phone] Fax: [phone]      Would the patient rather a call back or a response via [facility]? Call    Best Call Back Number: [phone]    Additional Information: apixaban (ELIQUIS) 5 mg Tab    Patient is requesting a call from the office regarding the above RX

## 2025-02-11 ENCOUNTER — HOSPITAL ENCOUNTER (OUTPATIENT)
Dept: CARDIOLOGY | Facility: CLINIC | Age: 60
Discharge: HOME | End: 2025-02-11
Payer: MEDICARE

## 2025-02-11 DIAGNOSIS — I48.91 ATRIAL FIBRILLATION, UNSPECIFIED TYPE: ICD-10-CM

## 2025-02-11 LAB
OHS QRS DURATION: 90 MS
OHS QTC CALCULATION: 473 MS

## 2025-02-12 ENCOUNTER — TELEPHONE (OUTPATIENT)
Dept: CARDIOLOGY | Facility: CLINIC | Age: 60
End: 2025-02-12
Payer: MEDICARE

## 2025-02-12 NOTE — TELEPHONE ENCOUNTER
Patient was informed that the request for an alternate medicine for Eliquis will be sent to the provider

## 2025-02-12 NOTE — TELEPHONE ENCOUNTER
----- Message from Lori sent at 2/12/2025  2:31 PM CST -----  Regarding: Needs Medical/RX Advice  Contact: patient at [phone]  Type: Needs Medical/RX Advice  Who Called:  patient at [phone]    Pharmacy name and phone #:    [facility] [address] [place] [facility] [address] [facility]  [address]  [place] [address]  Phone: [phone] Fax: [phone]      Additional Information: patient is calling to discuss the cost of the medication, which is $300. She needs assistance getting the medication at a reduced cost. She has only a few left before needing more. She states that she was provided a number to call for assistance but it went to social security. Please call and advise. Thank you      apixaban (ELIQUIS) 5 mg Tab 60 tablet 2 1/2/2025 4/2/2025   Sig - Route: Take 1 tablet (5 mg total) by mouth 2 (two) times daily. - Oral   Sent to pharmacy as: apixaban (ELIQUIS) 5 mg Tab   E-Prescribing Status: Receipt confirmed by pharmacy (1/2/2025  2:52 PM CST)

## 2025-02-13 ENCOUNTER — TELEPHONE (OUTPATIENT)
Dept: CARDIOLOGY | Facility: CLINIC | Age: 60
End: 2025-02-13
Payer: MEDICARE

## 2025-02-13 NOTE — TELEPHONE ENCOUNTER
----- Message from Keli Lea NP sent at 2/12/2025  4:33 PM CST -----  Regarding: FW: Needs Medical/RX Advice  Contact: patient at [phone]  Can we get assistance on this  ----- Message -----  From: Jamir Ferrera MA  Sent: 2/12/2025   4:15 PM CST  To: Keli Lea NP  Subject: FW: Needs Medical/RX Advice                      FYI  ----- Message -----  From: Lori Bernabe  Sent: 2/12/2025   2:34 PM CST  To: Venu Dickey Staff  Subject: Needs Medical/RX Advice                          Type: Needs Medical/RX Advice  Who Called:  patient at [phone]    Pharmacy name and phone #:    [facility] [address]  [place] - [address]  [address]  [place] [address]  Phone: [phone] Fax: [phone]      Additional Information: patient is calling to discuss the cost of the medication, which is $300. She needs assistance getting the medication at a reduced cost. She has only a few left before needing more. She states that she was provided a number to call for assistance but it went to social security. Please call and advise. Thank you      apixaban (ELIQUIS) 5 mg Tab 60 tablet 2 1/2/2025 4/2/2025   Sig - Route: Take 1 tablet (5 mg total) by mouth 2 (two) times daily. - Oral   Sent to pharmacy as: apixaban (ELIQUIS) 5 mg Tab   E-Prescribing Status: Receipt confirmed by pharmacy (1/2/2025  2:52 PM CST)

## 2025-02-14 NOTE — TELEPHONE ENCOUNTER
----- Message from Bria sent at 2/14/2025 12:57 PM CST -----  Regarding: advice  Type:  Needs Medical Advice    Who Called: pt    Best Call Back Number: [phone]      Additional Information: pt wants a callback from a nurse to discuss some concerns she's having with her meds.  please call to discuss.

## 2025-02-25 ENCOUNTER — TELEPHONE (OUTPATIENT)
Dept: CARDIOLOGY | Facility: CLINIC | Age: 60
End: 2025-02-25
Payer: MEDICARE

## 2025-02-25 RX ORDER — CLOPIDOGREL BISULFATE 75 MG/1
75 TABLET ORAL DAILY
Qty: 90 TABLET | Refills: 3 | Status: SHIPPED | OUTPATIENT
Start: 2025-02-25 | End: 2026-02-25

## 2025-02-25 NOTE — TELEPHONE ENCOUNTER
----- Message from Archie sent at 2/25/2025 12:26 PM CST -----  Regarding: advice  Contact: LELA DEL CASTILLO [[id number]]  Type: Needs Medical AdviceWho Called:  Chas Perry HHSymptoms (please be specific):  Shaunabernarda long has patient had these symptoms:  naPharmacy name and phone #:  naBest Call Back Number: [phone] Information: Questions about Eloquis Rx. Please call to advise.

## 2025-02-25 NOTE — TELEPHONE ENCOUNTER
Spoke with pt's home health nurse Vicky, she stated that the pt couldn't afford Eliquis but can afford Plavix if she could go back to taking that instead.

## 2025-02-25 NOTE — TELEPHONE ENCOUNTER
----- Message from Archie sent at 2/25/2025 12:26 PM CST -----  Regarding: advice  Contact: LELA DEL CASTILLO [[id number]]  Type: Needs Medical AdviceWho Called:  Chas Perry HHSymptoms (please be specific):  Shaunabernarda long has patient had these symptoms:  naPharmacy name and phone #:  naBest Call Back Number: [phone] Information: Questions about Eloquis Rx. Please call to advise.

## 2025-02-25 NOTE — TELEPHONE ENCOUNTER
Spoke with Dr. Adame he sent a new Rx in for Plavix, called pts Home health nurse Vicky to let her know.

## 2025-03-03 ENCOUNTER — TELEPHONE (OUTPATIENT)
Dept: CARDIOLOGY | Facility: CLINIC | Age: 60
End: 2025-03-03
Payer: MEDICARE

## 2025-03-03 NOTE — TELEPHONE ENCOUNTER
----- Message from Mattsergioon sent at 3/3/2025 10:33 AM CST -----  Type:  Needs Medical AdviceWould the patient rather a call back or a response via [facility]? Call [facility] Call Back Number: [phone] Additional Information: pt st she sent in monitor thru [employer] / she has been waiting & haven't heard anything back with the results.  please call to discuss

## 2025-03-03 NOTE — TELEPHONE ENCOUNTER
----- Message from Binta sent at 3/3/2025 10:53 AM CST -----    ----- Message -----  From: Sophia Rubio  Sent: 3/3/2025  10:35 AM CST  To: Deep Foster Staff    Type:  Needs Medical AdviceWould the patient rather a call back or a response via [facility]? Call [facility] Call Back Number: [phone] Additional Information: pt st she sent in monitor thru [employer] / she has been waiting & haven't heard anything back with the results.  please call to discuss

## 2025-03-07 ENCOUNTER — RESULTS FOLLOW-UP (OUTPATIENT)
Dept: CARDIOLOGY | Facility: CLINIC | Age: 60
End: 2025-03-07

## 2025-03-13 ENCOUNTER — OFFICE VISIT (OUTPATIENT)
Dept: CARDIOLOGY | Facility: CLINIC | Age: 60
End: 2025-03-13
Payer: MEDICARE

## 2025-03-13 VITALS
OXYGEN SATURATION: 92 % | HEIGHT: 67 IN | BODY MASS INDEX: 26.89 KG/M2 | DIASTOLIC BLOOD PRESSURE: 78 MMHG | HEART RATE: 76 BPM | WEIGHT: 171.31 LBS | SYSTOLIC BLOOD PRESSURE: 123 MMHG

## 2025-03-13 DIAGNOSIS — R07.9 CHEST PAIN, UNSPECIFIED TYPE: ICD-10-CM

## 2025-03-13 DIAGNOSIS — G47.30 SLEEP APNEA, UNSPECIFIED TYPE: Primary | ICD-10-CM

## 2025-03-13 DIAGNOSIS — D64.9 CHRONIC ANEMIA: ICD-10-CM

## 2025-03-13 DIAGNOSIS — E78.00 PURE HYPERCHOLESTEROLEMIA: ICD-10-CM

## 2025-03-13 DIAGNOSIS — I15.2 HYPERTENSION ASSOCIATED WITH DIABETES: Chronic | ICD-10-CM

## 2025-03-13 DIAGNOSIS — E11.22 TYPE 2 DIABETES MELLITUS WITH CHRONIC KIDNEY DISEASE, WITHOUT LONG-TERM CURRENT USE OF INSULIN, UNSPECIFIED CKD STAGE: ICD-10-CM

## 2025-03-13 DIAGNOSIS — E11.59 HYPERTENSION ASSOCIATED WITH DIABETES: Chronic | ICD-10-CM

## 2025-03-13 DIAGNOSIS — J44.9 CHRONIC OBSTRUCTIVE PULMONARY DISEASE, UNSPECIFIED COPD TYPE: ICD-10-CM

## 2025-03-13 DIAGNOSIS — N18.6 ESRD (END STAGE RENAL DISEASE): ICD-10-CM

## 2025-03-13 DIAGNOSIS — R00.1 BRADYCARDIA: ICD-10-CM

## 2025-03-13 DIAGNOSIS — I48.91 ATRIAL FIBRILLATION, UNSPECIFIED TYPE: ICD-10-CM

## 2025-03-13 PROCEDURE — 99999 PR PBB SHADOW E&M-EST. PATIENT-LVL V: CPT | Mod: PBBFAC,,,

## 2025-03-13 NOTE — PROGRESS NOTES
Subjective:    Patient ID:  Leanna García is a 59 y.o. female patient here for evaluation Results and Follow-up (Discuss results from monitor  no issues stated by pt )      History of Present Illness:     Denies chest pain, shortness of breath, lightheadedness, dizziness, jaw/neck/arm pain, palpitations, orthopnea, PND, edema, or bleeding.      Reports occasional palpitations. PAF <1% on monitor.  Patient has history of bradycardia with low dose beta blockers in the past.  Cannot afford Eliquis    EVENT MONITOR reviewed    Predominant underlying rhythm was Sinus Rhythm.    Patient had a min HR of 55 bpm, max HR of 135 bpm, and avg HR of 68 bpm.    Atrial Fibrillation occurred (<1% burden), ranging from  bpm (avg of 101 bpm), the longest lasting 9 mins 53 secs with an avg rate of 97 bpm.    Atrial Fibrillation was detected within +/- 45 seconds of symptomatic patient event(s).    Isolated SVEs were rare (<1.0%, 4085), SVE Triplets were rare (<1.0%, 177), and no SVE Couplets were present.    Isolated VEs were occasional (1.6%, 80914), VE Couplets were rare (<1.0%, 15), and no VE Triplets were present. Ventricular Bigeminy and Trigeminy were present.    Symptom Triggered Event: The patient complained of 6 episodes. The symptom(s) included chest pain. The corresponding rhythm to the patient reported event was normal sinus rhythm with a normal AL interval at rates of 63-71 bpm and one episode of AF at rates of 117-123 bpm with mean rate of 75 bpm. Isolated PVC's noted.    Symptom Triggered Event: There were 6 auto-triggered events corresponding with normal sinus rhythm at rates of 65-80 bpm.  The symptom(s) included PACs.    See full report      Review of patient's allergies indicates:   Allergen Reactions    Dilaudid [hydromorphone] Nausea And Vomiting    Chlorhexidine Itching    Lortab [hydrocodone-acetaminophen] Itching       Past Medical History:   Diagnosis Date    A-fib     resolved per patient    Anemia  due to end stage renal disease 6/30/2022    Arthritis     Bronchitis     Cardiovascular event risk, ASCVD 10-year risk 6.7% 10/15/2016    Diabetes mellitus type II     Diabetic foot infection     Diabetic ulcer of left midfoot 05/05/2022    Disorder of kidney and ureter     Encounter for blood transfusion     ESRD (end stage renal disease) 05/18/2018    MANJINDER (generalized anxiety disorder) 10/25/2016    History of colon polyps 11/02/2016    Hyperlipidemia     Hypertension     Stroke      Past Surgical History:   Procedure Laterality Date    ANGIOGRAPHY OF LOWER EXTREMITY Left 10/18/2022    Procedure: Angiogram Extremity Unilateral;  Surgeon: Ali Khoobehi, MD;  Location: [facility] CATH/EP LAB;  Service: Vascular;  Laterality: Left;    AV FISTULA PLACEMENT Left 08/29/2022    Procedure: CREATION, AV FISTULA;  Surgeon: Ali Khoobehi, MD;  Location: [facility] OR;  Service: Vascular;  Laterality: Left;    BONE BIOPSY Left 08/24/2022    Procedure: Biopsy-Bone;  Surgeon: Porfirio Ponce DPM;  Location: [facility] OR;  Service: Podiatry;  Laterality: Left;    COLONOSCOPY N/A 10/05/2016    Procedure: COLONOSCOPY;  Surgeon: Pillo Chanel MD;  Location: [facility] ENDO;  Service: Endoscopy;  Laterality: N/A;    COLONOSCOPY N/A 04/26/2022    Procedure: COLONOSCOPY;  Surgeon: Saravanan Rachel MD;  Location: [facility] ENDO;  Service: Endoscopy;  Laterality: N/A;    FISTULOGRAM Left 08/24/2022    Procedure: Fistulogram;  Surgeon: Ali Khoobehi, MD;  Location: [facility] CATH/EP LAB;  Service: Vascular;  Laterality: Left;    FISTULOGRAM Left 01/31/2024    Procedure: Fistulogram;  Surgeon: Khoobehi, Ali, MD;  Location: [facility] CATH/EP LAB;  Service: Vascular;  Laterality: Left;    FOOT SURGERY Left     HERNIA REPAIR Bilateral 11/22/2016    inguinal    HYSTERECTOMY      INCISION AND DRAINAGE FOOT Left 05/13/2022    Procedure: INCISION AND DRAINAGE, FOOT;  Surgeon: Ricardo Bruno DPM;  Location: [facility] OR;  Service: Podiatry;  Laterality: Left;    OOPHORECTOMY       PERCUTANEOUS TRANSLUMINAL ANGIOPLASTY OF ARTERIOVENOUS FISTULA Left 01/31/2024    Procedure: PTA, AV FISTULA;  Surgeon: Khoobehi, Ali, MD;  Location: [facility] CATH/EP LAB;  Service: Vascular;  Laterality: Left;    THROMBECTOMY, AV FISTULA, UPPER EXTREMITY, PERCUTANEOUS  08/24/2022    Procedure: THROMBECTOMY, AV FISTULA, UPPER EXTREMITY, PERCUTANEOUS;  Surgeon: Ali Khoobehi, MD;  Location: [facility] CATH/EP LAB;  Service: Vascular;;    TOE AMPUTATION Left 08/26/2022    Procedure: AMPUTATION, TOE;  Surgeon: Porfirio Ponce DPM;  Location: [facility] OR;  Service: Podiatry;  Laterality: Left;     Social History[1]     Review of Systems:    As noted in HPI in addition      REVIEW OF SYSTEMS  CARDIOVASCULAR: No recent chest pain, palpitations, arm, neck, or jaw pain  RESPIRATORY: No recent fever, cough chills, SOB or congestion  : No blood in the urine  GI: No Nausea, vomiting, constipation, diarrhea, blood, or reflux.  MUSCULOSKELETAL: No myalgias  NEURO: No lightheadedness or dizziness  EYES: No Double vision, blurry, vision or headache              Objective        Vitals:    03/13/25 0821   BP: 123/78   Pulse: 76       LIPIDS - LAST 2   Lab Results   Component Value Date    CHOL 174 01/29/2024    CHOL 199 07/27/2023    HDL 39 (L) 01/29/2024    HDL 45 07/27/2023    LDLCALC Invalid, Trig>400.0 01/29/2024    LDLCALC 127.6 07/27/2023    TRIG 419 (H) 01/29/2024    TRIG 132 07/27/2023    CHOLHDL 22.4 01/29/2024    CHOLHDL 22.6 07/27/2023       CBC - LAST 2  Lab Results   Component Value Date    WBC 11.57 10/31/2024    WBC 8.66 10/30/2024    RBC 3.29 (L) 10/31/2024    RBC 3.74 (L) 10/30/2024    HGB 9 (L) 03/05/2025    HGB 8.9 (L) 02/26/2025    HCT 30.0 (L) 10/31/2024    HCT 29 (L) 10/30/2024    MCV 91 10/31/2024    MCV 87 10/30/2024    MCH 27.4 10/31/2024    MCH 27.0 10/30/2024    MCHC 30.0 (L) 10/31/2024    MCHC 30.9 (L) 10/30/2024    RDW 15.3 (H) 10/31/2024    RDW 14.9 (H) 10/30/2024     10/31/2024     10/30/2024     MPV 10.8 10/31/2024    MPV 10.1 10/30/2024    GRAN 9.2 (H) 10/31/2024    GRAN 79.8 (H) 10/31/2024    LYMPH 1.2 10/31/2024    LYMPH 10.6 (L) 10/31/2024    MONO 1.0 10/31/2024    MONO 8.9 10/31/2024    BASO 0.03 10/31/2024    BASO 0.05 10/30/2024    NRBC 0 10/31/2024    NRBC 0 10/30/2024       CHEMISTRY & LIVER FUNCTION - LAST 2  Lab Results   Component Value Date     10/31/2024     10/30/2024    K 5.8 (H) 10/31/2024    K 4.4 10/30/2024    CL 95 10/31/2024    CL 96 10/30/2024    CO2 20 (L) 10/31/2024    CO2 23 10/30/2024    ANIONGAP 21 (H) 10/31/2024    ANIONGAP 19 (H) 10/30/2024    BUN 62 (H) 03/05/2025    BUN 61 (H) 02/05/2025    CREATININE 9.2 (H) 10/31/2024    CREATININE 7.6 (H) 10/30/2024     (H) 10/31/2024     (H) 10/30/2024    CALCIUM 10 12/04/2024    CALCIUM 9.0 10/31/2024    PH 7.422 10/30/2024    PH 7.273 (L) 05/19/2021    MG 2.2 10/30/2024    MG 2.1 01/29/2024    ALBUMIN 3.6 10/31/2024    ALBUMIN 4.0 10/30/2024    PROT 7.1 10/31/2024    PROT 8.1 10/30/2024    ALKPHOS 84 10/31/2024    ALKPHOS 105 10/30/2024    ALT 14 10/31/2024    ALT 14 10/30/2024    AST 17 10/31/2024    AST 11 10/30/2024    BILITOT 0.5 10/31/2024    BILITOT 0.5 10/30/2024        CARDIAC PROFILE - LAST 2  Lab Results   Component Value Date    BNP 2,983 (H) 10/30/2024     (H) 01/29/2024    CPK 62 08/18/2022    CPK 38 08/08/2022    CPKMB 3.9 08/18/2022    TROPONINI 0.406 (H) 10/30/2024    TROPONINI 0.387 (H) 10/30/2024    TROPONINIHS 179.8 (HH) 01/31/2024    TROPONINIHS 165.4 (HH) 01/30/2024        COAGULATION - LAST 2  Lab Results   Component Value Date    LABPT 15.3 (H) 05/04/2022    LABPT 16.0 (H) 05/19/2021    INR 0.9 01/29/2024    INR 1.3 05/04/2022    APTT 22.5 (L) 05/04/2022    APTT 23.1 03/10/2022       ENDOCRINE & PSA - LAST 2  Lab Results   Component Value Date    HGBA1C 7.6 (H) 01/15/2025    HGBA1C 7.8 (H) 10/16/2024    MICROALBUR 13.3 08/12/2014    MICROALBUR 0.5 05/23/2013    TSH 5.000 11/03/2022     TSH 1.080 05/04/2022    PROCAL 0.40 (H) 06/07/2022    PROCAL 34.48 (H) 05/05/2022        ECHOCARDIOGRAM RESULTS  Results for orders placed during the hospital encounter of 10/30/24    Echo    Interpretation Summary    Left Ventricle: The left ventricle is mildly dilated. Mildly increased wall thickness. Regional wall motion abnormalities present. See diagram for wall motion findings. There is normal systolic function with a visually estimated ejection fraction of 55%. There is indeterminate diastolic function.    Left Atrium: Left atrium is moderately dilated.    Aortic Valve: Moderately calcified cusps. Mildly restricted motion. There is mild stenosis. Aortic valve area by VTI is 0.7 cm². Aortic valve peak velocity is 2.6 m/s. Mean gradient is 13.6 mmHg. The dimensionless index is 0.43.    Mitral Valve: There is moderate mitral annular calcification present. There is normal leaflet mobility. There is mild stenosis. The mean pressure gradient across the mitral valve is 4 mmHg at a heart rate of  bpm.  The estimated mitral valve area by direct planimetry is 3.3 cm2. There is at least moderate regurgitation.    Right Ventricle: The right ventricular cavity is at the upper limits of normal in size. Systolic function is normal.    Right Atrium: Right atrium is mildly dilated.    Tricuspid Valve: There is mild to moderate regurgitation.    Pulmonic Valve: There is mild to moderate regurgitation.    Pulmonary Artery: The estimated pulmonary artery systolic pressure is 60 mmHg.    IVC/SVC: Elevated venous pressure at 15 mmHg.    Aorta: Ascending aorta is mildly dilated measuring 3.7 cm.      CURRENT/PREVIOUS VISIT EKG  Results for orders placed or performed in visit on 02/06/25   IN OFFICE EKG 12-LEAD (to LUMI Mask)    Collection Time: 02/06/25 10:55 AM   Result Value Ref Range    QRS Duration 90 ms    OHS QTC Calculation 473 ms    Narrative    Test Reason : I48.91,    Vent. Rate :  66 BPM     Atrial Rate :  66 BPM     P-R  Int : 134 ms          QRS Dur :  90 ms      QT Int : 452 ms       P-R-T Axes :   8 -28 105 degrees    QTcB Int : 473 ms    Sinus rhythm with Premature atrial complexes with Aberrant conduction  Minimal voltage criteria for LVH, may be normal variant  T wave abnormality, consider lateral ischemia  Prolonged QT  Abnormal ECG  When compared with ECG of 31-Oct-2024 01:49,  Aberrant conduction is now Present  ST no longer depressed in Anterior leads  T wave inversion less evident in Anterior-lateral leads  Confirmed by Cory Goodrich ([id number]) on 2/11/2025 8:22:28 PM    Referred By: STRONG           Confirmed By: Coyr Goodrich     No valid procedures specified.   Results for orders placed during the hospital encounter of 01/22/24    Nuclear Stress Test    Interpretation Summary    The ECG portion of the study is negative for ischemia.    The patient reported no chest pain during the stress test.    There were no arrhythmias during stress.    The nuclear portion of this study will be reported separately.    No valid procedures specified.    PHYSICAL EXAM  CONSTITUTIONAL: Well built, well nourished in no apparent distress  NECK: no carotid bruit, no JVD  LUNGS: CTA  CHEST WALL: no tenderness  HEART: regular rate and rhythm, S1, S2 normal, no murmur, click, rub or gallop   ABDOMEN: soft, non-tender; bowel sounds normal; no masses,  no organomegaly  EXTREMITIES: Extremities normal, no edema, no calf tenderness noted  NEURO: AAO X 3    I HAVE REVIEWED :    The vital signs, nurses notes, and all the pertinent radiology and labs.        Current Outpatient Medications   Medication Instructions    ACCU-CHEK GUIDE GLUCOSE METER Misc USE TO CHECK BLOOD GLUCOSE ONCE DAILY    acetaminophen (TYLENOL) 325 mg, Every 6 hours    amLODIPine (NORVASC) 5 mg, Oral, Daily    aspirin (ECOTRIN) 81 mg, Oral, Daily    atorvastatin (LIPITOR) 80 mg, Oral, Daily    blood sugar diagnostic Strp To check BG 1 times daily, to use with insurance preferred  "meter    busPIRone (BUSPAR) 5 mg, 2 times daily    cetirizine (ZYRTEC) 10 mg, Oral, Every other day    cinacalcet (SENSIPAR) 60 MG Tab 2 tablets, 2 times daily with meals    cinnamon bark (CINNAMON) 500 mg, 3 times daily    clopidogreL (PLAVIX) 75 mg, Oral, Daily    fluticasone propionate (FLONASE) 50 mcg, Each Nostril, Daily    iron sucrose in NS (VENOFER) 50 mg    lancets Misc To check BG 1 times daily, to use with insurance preferred meter    lancets Misc To check BG 3 times daily, to use with insurance preferred meter    losartan (COZAAR) 100 mg, Oral, Daily    minoxidiL (LONITEN) 10 mg, Oral, 2 times daily    multivitamin (THERAGRAN) per tablet 1 tablet, Daily    nitroGLYCERIN (NITROSTAT) 0.4 mg, Sublingual, Every 5 min PRN    omega-3 fatty acids 1,000 mg, Oral, Daily    ondansetron (ZOFRAN-ODT) 4 MG TbDL Place 1 tablet 3 times a day by translingual route as needed.    pantoprazole (PROTONIX) 40 MG tablet 1 tablet, Daily    pen needle, diabetic (BD ULTRA-FINE ROBERT PEN NEEDLE) 32 gauge x 5/32" Ndle 1 pen , Misc.(Non-Drug; Combo Route), 4 times daily, To use 4 times per day with insulin injections.    silver sulfADIAZINE 1% (SSD) 1 % cream APPLY CREAM EXTERNALLY TO AFFECTED AREA TWICE DAILY FOR 14 DAYS    UNABLE TO FIND medication name: Linezolid/Ciprofloxacin/Itraconazole 9%/8%/2% In MCT Powder [[id number]]    VELPHORO 500 mg, 3 times daily          Assessment & Plan     Sleep apnea  CPAP as tolerated    T2DM (type 2 diabetes mellitus)  Managed by PCP.  HgbA1C 7.6.    Chronic anemia  Closely monitor H&H- progressively worsening.  If continues to drop, would recommend GI evaluation.  Denies bleeding    ESRD (end stage renal disease)  Complaint with HD on MWF.        Hypertension associated with diabetes  BP stable.  Low sodium heart healthy diet  Continue losartan 100 mg daily  Continue amlodipine 5 mg daily  Continue minoxidil 10 mg BID  Maintain /80 or less    Hyperlipemia  Lipid panel when fasting  Continue " statin therapy.  Recommend LDL <100    Atrial fibrillation, unspecified type  <1%.  Cannot afford Eliquis  Cannot tolerate beta blockers  Continue DAPT    Chronic obstructive pulmonary disease, unspecified  Stable.  Per PCP and pulmonology    Chest pain  During brief episodes of AF.  Overall stable.      Bradycardia  Stable.  No significant bradycardia          Follow up in about 3 months (around 6/13/2025).            [1]   Social History  Tobacco Use    Smoking status: Never    Smokeless tobacco: Never   Substance Use Topics    Alcohol use: No    Drug use: No

## 2025-03-13 NOTE — ASSESSMENT & PLAN NOTE
Closely monitor H&H- progressively worsening.  If continues to drop, would recommend GI evaluation.  Denies bleeding

## 2025-04-23 ENCOUNTER — PATIENT OUTREACH (OUTPATIENT)
Dept: ADMINISTRATIVE | Facility: HOSPITAL | Age: 60
End: 2025-04-23
Payer: MEDICARE

## 2025-04-23 ENCOUNTER — PATIENT MESSAGE (OUTPATIENT)
Dept: ADMINISTRATIVE | Facility: HOSPITAL | Age: 60
End: 2025-04-23
Payer: MEDICARE

## 2025-04-23 NOTE — LETTER
April 23, 2025    Leanna García  [address]  [place] [address]             [facility]  [address]  [facility] [address]  Phone: [phone] Dear Leanna    Your [facility] primary care team is dedicated to assisting you achieve your health goals.  In order to do so, scheduling your routine screenings and tests are key to your good health.  Our records indicate you may be overdue for your mammogram screening.  Mammography screening can help find breast cancer at an early stage, when it is most likely to be successfully treated.    If you recently had your mammogram screening outside of [facility], please let your primary care team know so that they can update your health record.  If you have an upcoming scheduled Mammogram appointment , please disregard this letter.      If you have questions or want to schedule your screening, please send a message to your primary care physician via [email or web address] or contact his/her office at [phone].     Thank you for letting us care for you,      Sincerely,      Your [facility] Primary Care Team  Betsey Hidalgo, Care Coordinator  Phone: [phone]  FAX: [phone]

## 2025-04-23 NOTE — PROGRESS NOTES
Population Health Chart Review & Patient Outreach Details    Outreach Performed: YES Portal Active and/or Letter inactive    Additional [facility] Health Notes:    BREAST CANCER SCREENING    Non-compliant report chart audits for BREAST CANCER SCREENING     Outreach to patient in reference to SCHEDULING A MAMMOGRAM EXAM.            Updates Requested / Reviewed:               Health Maintenance Topics Overdue:      VBHM Score: 4     Eye Exam  Lipid Panel  Mammogram  Foot Exam

## 2025-04-28 ENCOUNTER — TELEPHONE (OUTPATIENT)
Dept: CARDIOLOGY | Facility: CLINIC | Age: 60
End: 2025-04-28
Payer: MEDICARE

## 2025-04-28 NOTE — TELEPHONE ENCOUNTER
----- Message from Erin sent at 4/28/2025  1:37 PM CDT -----  Contact: Keli  Type:  Needs Medical AdviceWho Called: Dr Perry's officeWould the patient rather a call back or a response via [email or web address]? [facility]Best Call Back Number: [phone] Information: checking the status of the surgical clearance. Please call and ref-fax to [phone]

## 2025-04-28 NOTE — TELEPHONE ENCOUNTER
----- Message from Ashlie sent at 4/28/2025 11:33 AM CDT -----  Regarding: surgical clearance  Contact: patient  Type: Needs Medical AdviceWho Called:  patientSymptoms (please be specific):  How long has patient had these symptoms:  Pharmacy name and phone #:  Best Call Back Number: [phone] (home) Additional Information: Patient states she is having surgery with Dr. Perry May 15, 2025 and needs surgical clearance.  Thanks!  
Spoke with patient about clearance request received once processed will forward to surgical provider  
pacemaker only

## 2025-04-29 ENCOUNTER — TELEPHONE (OUTPATIENT)
Dept: CARDIOLOGY | Facility: CLINIC | Age: 60
End: 2025-04-29
Payer: MEDICARE

## 2025-04-29 NOTE — TELEPHONE ENCOUNTER
----- Message from Sophia sent at 4/29/2025  4:06 PM CDT -----  Type:  Patient Returning CallWho Called:ptWho Left Message for Patient:Ricco the patient know what this is regarding?:low heart rateWould the patient rather a call back or a response via [facility]? Call [facility] Call Back Number:[phone] Information: please call to discuss

## 2025-04-29 NOTE — TELEPHONE ENCOUNTER
----- Message from Sophia sent at 4/29/2025  4:06 PM CDT -----  Type:  Patient Returning CallWho Called:ptWho Left Message for Patient:Ricco the patient know what this is regarding?:low heart rateWould the patient rather a call back or a response via [facility]? Call [facility] Call Back Number:[phone] Information: please call to discuss

## 2025-04-29 NOTE — TELEPHONE ENCOUNTER
Spoke with pt home health nurse dr hay told her to tell pt to stop all beta blockers make sure metoprolol is not in medications or pt is not taking it at all

## 2025-04-29 NOTE — TELEPHONE ENCOUNTER
----- Message from Sarthakerikanadia sent at 4/29/2025 12:18 PM CDT -----  Regarding: advice  Type:  Needs Medical AdviceWho Called: génesis navarrete [facility] Call Back Number: [phone] Information: heart rate is 42 today with no symptom. please call to discuss.

## 2025-05-06 ENCOUNTER — TELEPHONE (OUTPATIENT)
Dept: CARDIOLOGY | Facility: CLINIC | Age: 60
End: 2025-05-06
Payer: MEDICARE

## 2025-05-06 ENCOUNTER — TELEPHONE (OUTPATIENT)
Dept: CARDIOLOGY | Facility: CLINIC | Age: 60
End: 2025-05-06

## 2025-05-06 NOTE — TELEPHONE ENCOUNTER
----- Message from Sophia sent at 5/6/2025  3:18 PM CDT -----  Type:  Needs Medical AdviceWho Called: ptWould the patient rather a call back or a response via [facility]? Call [facility] Call Back Number: [phone] Additional Information:  timur roberts calling for this pt. st she was informed a clerance was going to be faxed over & she st she never received anything & pt have surgery 5/15. Timur arnold can be faxed. [phone] fax #. please call to discuss

## 2025-05-06 NOTE — TELEPHONE ENCOUNTER
----- Message from Ismael sent at 5/6/2025 12:25 PM CDT -----  Regarding: home health  Contact: Vicky with [facility]  Type:  Patient Returning CallWho Called:Vicky Borne with [facility]Who Left Message for Patient:nurseDoes the patient know what this is regarding?:patient unable to swallow medication : atorvastatin (LIPITOR) 80 MG tablet   so patient has not been taking it/ Would the patient rather a call back or a response via [facility]? Please [facility] Call Back Number:[phone] Information:

## 2025-05-12 ENCOUNTER — TELEPHONE (OUTPATIENT)
Dept: CARDIOLOGY | Facility: CLINIC | Age: 60
End: 2025-05-12
Payer: MEDICARE

## 2025-05-15 ENCOUNTER — PATIENT MESSAGE (OUTPATIENT)
Dept: ADMINISTRATIVE | Facility: HOSPITAL | Age: 60
End: 2025-05-15
Payer: MEDICARE

## 2025-05-19 NOTE — TELEPHONE ENCOUNTER
Please Advise     Spoke w/pt and confirmed upcoming appt dates, times, and locations. I informed pt that I will mail her appt slips to her and if she has any questions are need to make changes to contact me. Pt verbalized understanding. Reminders mailed.

## 2025-07-08 ENCOUNTER — PATIENT OUTREACH (OUTPATIENT)
Dept: ADMINISTRATIVE | Facility: HOSPITAL | Age: 60
End: 2025-07-08
Payer: MEDICARE

## 2025-07-08 NOTE — PROGRESS NOTES
Population Health Chart Review & Patient Outreach Details    Outreach Performed: YES Portal Active and/or Letter inactive  Additional [facility] Health Notes:      Routine Dilated Eye Exam  (Diabetic Retinopathy screening)     Non-compliant report chart audits for Eye Exam for Patients with Diabetes     Outreach to patient in reference to a routine Eye Exam                Updates Requested / Reviewed:             Health Maintenance Topics Overdue:      [facility] Score: 5     Colon Cancer Screening  Eye Exam  Lipid Panel  Mammogram  Foot Exam                       Health Maintenance Topic(s) Outreach Outcomes & Actions Taken:  Eye Exam - Outreach Outcomes & Actions Taken  :

## (undated) DEVICE — GUIDEWIRE STIFF ANGLED 035X260 LAUREATE

## (undated) DEVICE — BALLOON SCORING PTA 4.0 X 40 X 137

## (undated) DEVICE — DRESSING ADAPTIC 3X3 6112

## (undated) DEVICE — NEEDLE SAFETY ECLIPSE 18G 1-1/2

## (undated) DEVICE — CUFF TOURNIQUET 18DUAL PRT 5921-218-235

## (undated) DEVICE — GLOVE BIOGEL PI ULTRA TOUCH G GRAY SZ 7

## (undated) DEVICE — PAD BOVIE ADULT

## (undated) DEVICE — NEEDLE JAMSHIDI 11G 4 DJ4011X

## (undated) DEVICE — SPONGE GAUZE 2S 4X4 STERILE NON21424

## (undated) DEVICE — SHOE POST OP FEMALE LARGE

## (undated) DEVICE — INTRODUCER KIT STICK MINI MAX 5F X 10

## (undated) DEVICE — BANDAGE ACE STERILE 4 REB3114

## (undated) DEVICE — SHEATH INTRODUCER 5FR 10CM

## (undated) DEVICE — Device

## (undated) DEVICE — SPONGE XRAY DETECTABLE 4X4

## (undated) DEVICE — GUIDEWIRE DOUBLE ENDED .035 DIA. 150CML

## (undated) DEVICE — PAD ABD 5X9   7196D

## (undated) DEVICE — SUTURE VICRYL 3-0 RB-1 27 J215H

## (undated) DEVICE — GUIDEWIRE LAUREATE .035X260CM

## (undated) DEVICE — CLIP APPLIER MEDIUM MSM20

## (undated) DEVICE — GUIDEWIRE WHOLEY 260CMX0.035IN

## (undated) DEVICE — TRAY LOWER EXTREMITY  SMHS029-05

## (undated) DEVICE — GLOVE BIOGEL PI   GOLD SIZE 8

## (undated) DEVICE — SPONGE SURGIFOAM 8X12.5 1974

## (undated) DEVICE — LOOP MAXI RED 30-722

## (undated) DEVICE — GLOVE BIOGEL PI ULTRA TOUCH GRAY SZ7.5

## (undated) DEVICE — GLIDECATH ANGLED TAPER 5FR 100CM CG508

## (undated) DEVICE — SHEATH PINNACLE 6FRX10CM W/GUIDEWIRE

## (undated) DEVICE — DRAPE SPLIT W/ ADHESIVE

## (undated) DEVICE — SUTURE VICRYL 2-0 RB-1 27 J306H

## (undated) DEVICE — HEMOSPLIT HEMODIALYSIS CATH, ST, AIRGUARD, 14.5 FR. 23CM
Type: IMPLANTABLE DEVICE | Site: NECK | Status: NON-FUNCTIONAL
Brand: HEMOSPLIT LONG-TERM HEMODIALYSIS CATHETER
Removed: 2022-08-24

## (undated) DEVICE — SHEATH PINNACLE 5FRX10CM W/GUIDEWIRE

## (undated) DEVICE — SUTURE PROLENE 5-0 C-1 24 8725H

## (undated) DEVICE — TRAY SKIN PREP DRY

## (undated) DEVICE — BANDAGE SOFTBAND 4 441506

## (undated) DEVICE — BLADE OSCILLATING KM3-111

## (undated) DEVICE — KIT INFLATION MERIT (IN8152, HP9200E)

## (undated) DEVICE — SUTURE PROLENE 6-0 BV-1 30 8709H

## (undated) DEVICE — SOLUTION IRRI NS BOTTLE 1000ML R5200-01

## (undated) DEVICE — SPONGE GAUZE 10S 4X4  442214

## (undated) DEVICE — SUTURE PROLENE 4-0 PS-2 18 8682H

## (undated) DEVICE — BLLN MUSTANG 7 X 60 X 75

## (undated) DEVICE — CLIP APPLIER SM MCS20

## (undated) DEVICE — DRESSING TEGADERM 4X4 3/4 TD1004

## (undated) DEVICE — JELLY LUBRICATING TUBE 4OZ 4OZLUB

## (undated) DEVICE — TUBE CULTURE AMIES 220117

## (undated) DEVICE — SUTURE VICRYL 2-0 27 SH VCP417H

## (undated) DEVICE — CATHETER EMBOLECTOMY PYTHON OTW 5FX40

## (undated) DEVICE — SHEATH INTRODUCER DESTINATION 6FX45

## (undated) DEVICE — CATHETER SOFT-VU NON-BRAIDED BERENSTEIN 5FRX65X40

## (undated) DEVICE — GUIDEWIRE ANGLED GLIDE .035-150 GR3506

## (undated) DEVICE — KIT MICROINTRODUCE MINI 5X10CM

## (undated) DEVICE — CATHETER ANGIO OMNI FLUSH 10732201

## (undated) DEVICE — KIT INFLATION MERIT

## (undated) DEVICE — CATHETER RIM

## (undated) DEVICE — SUTURE VICRYL 4-0 SH 27 J415H

## (undated) DEVICE — BAG DECANTER 10-102

## (undated) DEVICE — GUIDEWIRE CONTROL V-18 8X300CM

## (undated) DEVICE — SYRINGE 10ML 302995

## (undated) DEVICE — BLADE SCALPEL #15 371115

## (undated) DEVICE — INTRODUCER VASC 6FX6VM*

## (undated) DEVICE — DRESSING POST OP MEPILEX AG 4X8

## (undated) DEVICE — SOLUTION SCRUB IODINE 4OZ

## (undated) DEVICE — BLLN MUSTANG 6 X 40 X 135

## (undated) DEVICE — SET AVX THROMBECTOMY 50CM,OTW,6F

## (undated) DEVICE — BANDAGE KERLIX   441106

## (undated) DEVICE — BANDAGE ESMARK 4X9 55514

## (undated) DEVICE — ADHESIVE TISSUE EXOFIN

## (undated) DEVICE — TRAY VASCULAR SLIDELL MEMORIAL

## (undated) DEVICE — GUIDEWIRE REGALIA XS 0.014X300CM

## (undated) DEVICE — AGENT HEMOSTATIC ARISTA 1GR

## (undated) DEVICE — SOLUTION PREP IODINE 4OZ

## (undated) DEVICE — NEEDLE SAFETY ECLIPSE 25G 1-1/2IN 305767

## (undated) DEVICE — SUTURE VICRYL 3-0 18 SH VCP864D

## (undated) DEVICE — CATHETER EMBOLECTOMY 5FR 80CM